# Patient Record
Sex: FEMALE | Race: WHITE | NOT HISPANIC OR LATINO | Employment: OTHER | URBAN - METROPOLITAN AREA
[De-identification: names, ages, dates, MRNs, and addresses within clinical notes are randomized per-mention and may not be internally consistent; named-entity substitution may affect disease eponyms.]

---

## 2017-01-03 ENCOUNTER — TELEPHONE (OUTPATIENT)
Dept: INTERNAL MEDICINE | Facility: CLINIC | Age: 67
End: 2017-01-03

## 2017-01-03 NOTE — TELEPHONE ENCOUNTER
----- Message from Lea Rivas sent at 1/3/2017  8:07 AM CST -----  Contact: ELIEZER RODRIGUEZ [[id number]]  Patient cancelled appt today

## 2017-01-04 ENCOUNTER — DOCUMENTATION ONLY (OUTPATIENT)
Dept: PSYCHIATRY | Facility: CLINIC | Age: 67
End: 2017-01-04

## 2017-01-04 NOTE — PROGRESS NOTES
Pt called today to reschedule her follow up appointment with me today.      KIT CLARK MD   [facility]   1/4/2017 9:51 AM

## 2017-01-09 NOTE — TELEPHONE ENCOUNTER
----- Message from Catrina Romo sent at 1/9/2017 11:53 AM CST -----  Contact: pt  _  1st Request  _  2nd Request  _  3rd Request    Please refill the medication(s) listed below. Please call the patient when the prescription(s) is ready for  at the phone number (___)(___-_____) .[phone]    Medication #1hydrocodone-acetaminophen 10-325mg (NORCO)  mg Tab    Medication #2      [facility] [address]

## 2017-01-09 NOTE — TELEPHONE ENCOUNTER
Pt's requesting RF for norco sent to preferred pharmacy    [facility] 12/28/16    Please auth request

## 2017-01-09 NOTE — TELEPHONE ENCOUNTER
Pt called again requesting RF for norco sent to preferred pharmacy    [facility] 12/28/16    Please auth request

## 2017-01-09 NOTE — TELEPHONE ENCOUNTER
----- Message from Yessy Fernandez sent at 1/6/2017  4:30 PM CST -----  Contact: Self  X   1st Request  _  2nd Request  _  3rd Request    Please refill the medication(s) listed below. Please call the patient when the prescription(s) is ready for  at the phone number [phone] .    Medication #1 hydrocodone-acetaminophen 10-325mg (NORCO)  mg Tab      Preferred Pharmacy: [facility] at [phone]

## 2017-01-10 RX ORDER — HYDROCODONE BITARTRATE AND ACETAMINOPHEN 10; 325 MG/1; MG/1
1 TABLET ORAL EVERY 12 HOURS PRN
Qty: 60 TABLET | Refills: 0 | Status: SHIPPED | OUTPATIENT
Start: 2017-01-10 | End: 2017-02-09 | Stop reason: SDUPTHER

## 2017-01-10 NOTE — TELEPHONE ENCOUNTER
Pt advised that rx auth and sent to preferred pharmacy    No further questions/concerns at this time

## 2017-01-10 NOTE — TELEPHONE ENCOUNTER
----- Message from Lilia Walls sent at 1/10/2017 10:18 AM CST -----  Contact: Self  X  1st Request  _  2nd Request  _  3rd Request        Who: ELIEZER RODRIGUEZ [[id number]]    Why: Pt is calling to find out the status of her refill request for hydrocodone-acetaminophen 10-325mg (NORCO)  mg Tab.  Pt is wanting to know if it will be sent to the pharmacy list below or will she need to come in an pick it up.  Please contact pt to further discuss and advise.    What Number to Call Back: [phone]    When to Expect a call back: (Before the end of the day)   -- if call after 3:00 call back will be tomorrow.    [id number] - [place], [place] - [address]

## 2017-01-18 ENCOUNTER — OFFICE VISIT (OUTPATIENT)
Dept: INTERNAL MEDICINE | Facility: CLINIC | Age: 67
End: 2017-01-18
Attending: INTERNAL MEDICINE
Payer: MEDICARE

## 2017-01-18 ENCOUNTER — LAB VISIT (OUTPATIENT)
Dept: LAB | Facility: OTHER | Age: 67
End: 2017-01-18
Attending: INTERNAL MEDICINE
Payer: MEDICARE

## 2017-01-18 ENCOUNTER — TELEPHONE (OUTPATIENT)
Dept: INTERNAL MEDICINE | Facility: CLINIC | Age: 67
End: 2017-01-18

## 2017-01-18 VITALS
HEIGHT: 66 IN | BODY MASS INDEX: 33.24 KG/M2 | WEIGHT: 206.81 LBS | DIASTOLIC BLOOD PRESSURE: 74 MMHG | HEART RATE: 93 BPM | SYSTOLIC BLOOD PRESSURE: 130 MMHG

## 2017-01-18 DIAGNOSIS — Z01.818 PRE-OPERATIVE CLEARANCE: ICD-10-CM

## 2017-01-18 DIAGNOSIS — Z23 NEED FOR INFLUENZA VACCINATION: ICD-10-CM

## 2017-01-18 DIAGNOSIS — E61.2 MAGNESIUM DEFICIENCY: Primary | ICD-10-CM

## 2017-01-18 DIAGNOSIS — E87.1 HYPONATREMIA: Primary | ICD-10-CM

## 2017-01-18 DIAGNOSIS — Z12.39 SCREENING FOR BREAST CANCER: ICD-10-CM

## 2017-01-18 DIAGNOSIS — E83.42 HYPOMAGNESEMIA: ICD-10-CM

## 2017-01-18 DIAGNOSIS — E87.1 HYPONATREMIA: ICD-10-CM

## 2017-01-18 LAB
ANION GAP SERPL CALC-SCNC: 7 MMOL/L
BUN SERPL-MCNC: 18 MG/DL
CALCIUM SERPL-MCNC: 8.6 MG/DL
CHLORIDE SERPL-SCNC: 97 MMOL/L
CO2 SERPL-SCNC: 24 MMOL/L
CREAT SERPL-MCNC: 0.8 MG/DL
EST. GFR  (AFRICAN AMERICAN): >60 ML/MIN/1.73 M^2
EST. GFR  (NON AFRICAN AMERICAN): >60 ML/MIN/1.73 M^2
GLUCOSE SERPL-MCNC: 87 MG/DL
MAGNESIUM SERPL-MCNC: 0.9 MG/DL
POTASSIUM SERPL-SCNC: 4.9 MMOL/L
SODIUM SERPL-SCNC: 128 MMOL/L

## 2017-01-18 PROCEDURE — 1160F RVW MEDS BY RX/DR IN RCRD: CPT | Mod: S$GLB,,, | Performed by: INTERNAL MEDICINE

## 2017-01-18 PROCEDURE — 99999 PR PBB SHADOW E&M-EST. PATIENT-LVL III: CPT | Mod: PBBFAC,,, | Performed by: INTERNAL MEDICINE

## 2017-01-18 PROCEDURE — 36415 COLL VENOUS BLD VENIPUNCTURE: CPT

## 2017-01-18 PROCEDURE — 1157F ADVNC CARE PLAN IN RCRD: CPT | Mod: S$GLB,,, | Performed by: INTERNAL MEDICINE

## 2017-01-18 PROCEDURE — 83735 ASSAY OF MAGNESIUM: CPT

## 2017-01-18 PROCEDURE — 3075F SYST BP GE 130 - 139MM HG: CPT | Mod: S$GLB,,, | Performed by: INTERNAL MEDICINE

## 2017-01-18 PROCEDURE — G0008 ADMIN INFLUENZA VIRUS VAC: HCPCS | Mod: S$GLB,,, | Performed by: INTERNAL MEDICINE

## 2017-01-18 PROCEDURE — 1125F AMNT PAIN NOTED PAIN PRSNT: CPT | Mod: S$GLB,,, | Performed by: INTERNAL MEDICINE

## 2017-01-18 PROCEDURE — 99397 PER PM REEVAL EST PAT 65+ YR: CPT | Mod: S$GLB,,, | Performed by: INTERNAL MEDICINE

## 2017-01-18 PROCEDURE — 80048 BASIC METABOLIC PNL TOTAL CA: CPT

## 2017-01-18 PROCEDURE — 1159F MED LIST DOCD IN RCRD: CPT | Mod: S$GLB,,, | Performed by: INTERNAL MEDICINE

## 2017-01-18 PROCEDURE — 3078F DIAST BP <80 MM HG: CPT | Mod: S$GLB,,, | Performed by: INTERNAL MEDICINE

## 2017-01-18 PROCEDURE — 99499 UNLISTED E&M SERVICE: CPT | Mod: S$PBB,,, | Performed by: INTERNAL MEDICINE

## 2017-01-18 PROCEDURE — 90662 IIV NO PRSV INCREASED AG IM: CPT | Mod: S$GLB,,, | Performed by: INTERNAL MEDICINE

## 2017-01-18 NOTE — TELEPHONE ENCOUNTER
Detailed msg left ONVM to inform her of BMP lab appt scheduled Monday    If pt RTC please notify with this msg

## 2017-01-18 NOTE — TELEPHONE ENCOUNTER
Spoke with pt. No weakness or palpitations or nausea. Pt reports has not taken oral magnesium in 4-5 days - no SE from this just forgot to take. Does agree to resume this today. Will give mag 400 twice daily and repeat labs on Monday. Counseled pt that this can cause diarrhea - if this develops then pt will decrease to 400mg daily. ER and RTC prompts given. Office to schedule labs on Monday.

## 2017-01-18 NOTE — PROGRESS NOTES
Patient given HD Influenza Im in the LD. Patient tolerated well and Band-Aid was applied. Lot#[id number] Exp:04/25/2017. Patient advised to wait in the lobby for 15 min to make sure no adverse reactions occur.Patient given VIS information sheet. Patient states verbal understanding and has no further questions.

## 2017-01-18 NOTE — MR AVS SNAPSHOT
[facility]  [address]  [place] [address]  Phone: [phone]  Fax: [phone]                  Nalini Varma   2017 8:20 AM   Office Visit    Description:  Female : 1950   Provider:  Yane Sahni MD   Department:  [facility]           Reason for Visit     Annual Exam           Diagnoses this Visit        Comments    Hyponatremia    -  Primary     Hypomagnesemia         Need for influenza vaccination         Screening for breast cancer                To Do List           Future Appointments        Provider Department Dept Phone    2017 9:30 AM LAB, [facility] [phone]    3/21/2017 9:00 AM [facility] MAMMO1 [facility] [phone]    3/28/2017 8:40 AM Yane Sahni MD [facility] [phone]    2017 8:20 AM Yane Sahni MD [facility] [phone]      Goals (5 Years of Data)     None      Follow-Up and Disposition     Return in about 3 months (around 2017), or if symptoms worsen or fail to improve.      [facility]     [facility] -  Assistance  Registered nurses in the [facility] provide clinical advisement, health education, appointment booking, and other advisory services.  Call for this free service at [phone].             Medications           STOP taking these medications     sertraline (ZOLOFT) 25 MG tablet Start by taking 25mg daily for 7 days then increase to 50mg daily. Please take with food           Verify that the below list of medications is an accurate representation of the medications you are currently taking.  If none reported, the list may be blank. If incorrect, please contact your healthcare provider. Carry this list with you in case of emergency.           Current Medications     albuterol 90 mcg/actuation inhaler Inhale 2 puffs into the lungs every 6 (six) hours as  "needed for Wheezing.    alendronate (FOSAMAX) 70 MG tablet Take 1 tablet (70 mg total) by mouth every 7 days.    atorvastatin (LIPITOR) 40 MG tablet Take 1 tablet (40 mg total) by mouth once daily.    calcium carbonate (CALTRATE 600) 600 mg (1,500 mg) Tab Take 1 tablet (600 mg total) by mouth 2 (two) times daily with meals.    cyanocobalamin 1,000 mcg TbSR Take 1,000 mcg by mouth once daily.    fluticasone-salmeterol (ADVAIR HFA) 115-21 mcg/actuation HFAA Inhale 2 puffs into the lungs every 12 (twelve) hours.    hydrocodone-acetaminophen 10-325mg (NORCO)  mg Tab Take 1 tablet by mouth every 12 (twelve) hours as needed for Pain.    lisinopril (PRINIVIL,ZESTRIL) 30 MG tablet Take 1 tablet (30 mg total) by mouth once daily.    magnesium oxide 400 mg Cap Take 1 capsule by mouth once daily.    pantoprazole (PROTONIX) 20 MG tablet Take 1 tablet (20 mg total) by mouth once daily.    tiotropium (SPIRIVA) 18 mcg inhalation capsule Inhale 1 capsule (18 mcg total) into the lungs once daily.    walker (ULTRA-LIGHT ROLLATOR) Misc 1 each by Misc.(Non-Drug; Combo Route) route once daily.           Clinical Reference Information           Vital Signs - Last Recorded  Most recent update: 1/18/2017  8:36 AM by Compa Junior MA    BP Pulse Ht Wt BMI    130/74 (BP Location: Left arm, Patient Position: Sitting, BP Method: Manual) 93 5' 6" (1.676 m) 93.8 kg (206 lb 12.7 oz) 33.38 kg/m2      Blood Pressure          Most Recent Value    BP  130/74      Allergies as of 1/18/2017     No Known Allergies      Immunizations Administered on Date of Encounter - 1/18/2017     Name Date Dose VIS Date Route    Influenza - High Dose 1/18/2017 0.5 mL 8/7/2015 Intramuscular      Orders Placed During Today's Visit      Normal Orders This Visit    Influenza - High Dose (65+) (PF) (IM)     Future Labs/Procedures Expected by Expires    Basic metabolic panel  1/18/2017 1/18/2018    Magnesium  1/18/2017 3/19/2018    Mammo Digital Screening " Bilateral With CAD  3/18/2017 3/18/2018      Smoking Cessation     If you would like to quit smoking:   You may be eligible for free services if you are a [place] resident and started smoking cigarettes before September 1, 1988.  Call the [employer]oking [employer]essation [employer] ([employer]) toll free at (968) 142-4820 or [phone] [phone].   Call 4-420-QUIT-NOW if you do not meet the above criteria.

## 2017-01-18 NOTE — PROGRESS NOTES
Subjective:       Patient ID: Nalini Varma is a 66 y.o. female.    Chief Complaint: Annual Exam    HPI   Pt here for hospital f/u. Was admitted with low mag and sodium. Started on zoloft in mid Nov. Sodium low since then. No confusion or MS changes. She has been taking mag 400mg otc daily. Reports weakness has not returned.   hctz stopped at discharge. bp well controlled today.     Is planning on cataract surgery 1/25.   Was given zoloft for anxiety by psych. Pt does not report any significant improvement or any SE from this     No cp. Has hx of copd and tobacco dependence. nelson inhalers. No change in MEDELLIN. Seen by cards 10/06 and stress test neg for myocaridal ischemia on stress echo. No cp. Decreased exertion due to chronic knee arthritis. Is able to walk a block with walker - will stop 1-2 times due to knee pain.     Review of Systems    Objective:      Physical Exam   Constitutional: She is oriented to person, place, and time. She appears well-developed and well-nourished.   HENT:   Head: Normocephalic and atraumatic.   Eyes: Conjunctivae and EOM are normal.   Neck: Neck supple.   Cardiovascular: Normal rate, regular rhythm, normal heart sounds and intact distal pulses.    Pulmonary/Chest: Effort normal and breath sounds normal. No respiratory distress. She has no wheezes. She has no rales.   Abdominal: Soft. Normal appearance and bowel sounds are normal.   Lymphadenopathy:     She has no cervical adenopathy.   Neurological: She is alert and oriented to person, place, and time. She has normal strength. Gait normal.   Skin: Skin is warm, dry and intact. No cyanosis. Nails show no clubbing.   Psychiatric: She has a normal mood and affect. Her speech is normal and behavior is normal. Judgment and thought content normal. Cognition and memory are normal.       Assessment:       Nalini was seen today for annual exam.    Diagnoses and all orders for this visit:    Hyponatremia: euvolemic, suspect 2/2 to zoloft given  for anxiety. Will stop this. If any issues with stopping med, pt understands to let me know. Check bmp today. No MS changes or weakness. ER and RTC prompts given.   -     Basic metabolic panel; Future    Hypomagnesemia: cont supplement, check labs.   -     Magnesium; Future  -     Basic metabolic panel; Future    Need for influenza vaccination  -     Influenza - High Dose (65+) (PF) (IM)    Screening for breast cancer  -     Mammo Digital Screening Bilateral With CAD; Future    pre op clearance: seen by cards for abnormal ekg in October - reviewed notes and recent stress test - no cp. pending review of labs - counseled pt that if develops copd exacerbation or weakness then rec postpone elective surgery in that setting.     Planning on cataract surgery on 1/25  Stop zoloft  Give flu vacc today  Schedule mmg   Check labs

## 2017-01-18 NOTE — TELEPHONE ENCOUNTER
Maribel from  lab called with critical lab low mag 0.9 on pt    Pt seen today in clinic    Please auth/ advised

## 2017-01-23 ENCOUNTER — LAB VISIT (OUTPATIENT)
Dept: LAB | Facility: OTHER | Age: 67
End: 2017-01-23
Attending: INTERNAL MEDICINE
Payer: MEDICARE

## 2017-01-23 ENCOUNTER — TELEPHONE (OUTPATIENT)
Dept: INTERNAL MEDICINE | Facility: CLINIC | Age: 67
End: 2017-01-23

## 2017-01-23 DIAGNOSIS — E87.1 HYPONATREMIA: Primary | ICD-10-CM

## 2017-01-23 DIAGNOSIS — E61.2 MAGNESIUM DEFICIENCY: ICD-10-CM

## 2017-01-23 DIAGNOSIS — E83.42 HYPOMAGNESEMIA: ICD-10-CM

## 2017-01-23 DIAGNOSIS — E87.1 HYPONATREMIA: ICD-10-CM

## 2017-01-23 LAB
ANION GAP SERPL CALC-SCNC: 5 MMOL/L
BUN SERPL-MCNC: 13 MG/DL
CALCIUM SERPL-MCNC: 9.4 MG/DL
CHLORIDE SERPL-SCNC: 98 MMOL/L
CO2 SERPL-SCNC: 27 MMOL/L
CREAT SERPL-MCNC: 0.8 MG/DL
EST. GFR  (AFRICAN AMERICAN): >60 ML/MIN/1.73 M^2
EST. GFR  (NON AFRICAN AMERICAN): >60 ML/MIN/1.73 M^2
GLUCOSE SERPL-MCNC: 113 MG/DL
MAGNESIUM SERPL-MCNC: 1.2 MG/DL
POTASSIUM SERPL-SCNC: 4.6 MMOL/L
SODIUM SERPL-SCNC: 130 MMOL/L

## 2017-01-23 PROCEDURE — 80048 BASIC METABOLIC PNL TOTAL CA: CPT

## 2017-01-23 PROCEDURE — 83735 ASSAY OF MAGNESIUM: CPT

## 2017-01-23 PROCEDURE — 36415 COLL VENOUS BLD VENIPUNCTURE: CPT

## 2017-01-24 NOTE — TELEPHONE ENCOUNTER
Please notify pt that her sodium is still low but continuing to slowly improve - think cause was due to hctz and zoloft. Do NOT recommend resuming these.    Magnesium has mildly improved but is still low. rec cont magnesium supplement daily.   Please schedule repeat labs in 2 weeks.

## 2017-01-24 NOTE — TELEPHONE ENCOUNTER
----- Message from Catrina Romo sent at 1/23/2017  4:10 PM CST -----  Contact: pt  _  1st Request  _  2nd Request  _  3rd Request        Who: pt    Why: pt needs her blood test results. She also needs to know if she should go back on magnesium. She has cancelled her cataract appt this wednesday    What Number to Call Back:[phone]    When to Expect a call back: (Before the end of the day)   -- if call after 3:00 call back will be tomorrow.

## 2017-01-24 NOTE — TELEPHONE ENCOUNTER
Pt advised of Dr. Sahni advice, verbalized understanding, and has no further questions/concerns at this time      appt reminder letter mailed to pt as requested

## 2017-02-03 NOTE — TELEPHONE ENCOUNTER
----- Message from Yessy Fernandez sent at 2/3/2017  1:16 PM CST -----  Contact: Self  X   1st Request  _  2nd Request  _  3rd Request        Who: ELIEZER RODRIGUEZ [[id number]]    Why: Pt states her cataract surgery is on 02/08 and her medical clearance form is needed prior to her surgery. Pt states the form along with fax number was left with the clinical team. Please call pt, thanks!    What Number to Call Back: [phone]    When to Expect a call back: (Before the end of the day)   -- if the call is after 12:00, the call back will be tomorrow.

## 2017-02-06 ENCOUNTER — LAB VISIT (OUTPATIENT)
Dept: LAB | Facility: OTHER | Age: 67
End: 2017-02-06
Attending: INTERNAL MEDICINE
Payer: MEDICARE

## 2017-02-06 DIAGNOSIS — E83.42 HYPOMAGNESEMIA: ICD-10-CM

## 2017-02-06 DIAGNOSIS — E87.1 HYPONATREMIA: ICD-10-CM

## 2017-02-06 LAB
ANION GAP SERPL CALC-SCNC: 12 MMOL/L
BUN SERPL-MCNC: 21 MG/DL
CALCIUM SERPL-MCNC: 9.2 MG/DL
CHLORIDE SERPL-SCNC: 99 MMOL/L
CO2 SERPL-SCNC: 19 MMOL/L
CREAT SERPL-MCNC: 0.9 MG/DL
EST. GFR  (AFRICAN AMERICAN): >60 ML/MIN/1.73 M^2
EST. GFR  (NON AFRICAN AMERICAN): >60 ML/MIN/1.73 M^2
GLUCOSE SERPL-MCNC: 69 MG/DL
MAGNESIUM SERPL-MCNC: 1.4 MG/DL
POTASSIUM SERPL-SCNC: 4.9 MMOL/L
SODIUM SERPL-SCNC: 130 MMOL/L

## 2017-02-06 PROCEDURE — 83735 ASSAY OF MAGNESIUM: CPT

## 2017-02-06 PROCEDURE — 80048 BASIC METABOLIC PNL TOTAL CA: CPT

## 2017-02-06 PROCEDURE — 36415 COLL VENOUS BLD VENIPUNCTURE: CPT

## 2017-02-07 ENCOUNTER — TELEPHONE (OUTPATIENT)
Dept: INTERNAL MEDICINE | Facility: CLINIC | Age: 67
End: 2017-02-07

## 2017-02-07 NOTE — TELEPHONE ENCOUNTER
----- Message from Farzaneh Hernández sent at 2/7/2017 12:20 PM CST -----  _  1st Request  _  2nd Request  x_  3rd Request        Who: Patient    Why: Pt called she states that she need medical clearence for her surgery on tomorrow. Please call her as soon as possible.       What Number to Call Back:[phone]    When to Expect a call back: (Before the end of the day)   -- if the call is after 12:00, the call back will be tomorrow.

## 2017-02-07 NOTE — TELEPHONE ENCOUNTER
Please notify pt that sodium on labs is stable, magnesium is low but slowly improving. rec cont to take magnesium supplement.     Form for cataract clearance filled out. Ok to proceed as scheduled. Please fax to eye clinic asap and notify pt of this. Thanks!

## 2017-02-08 ENCOUNTER — TELEPHONE (OUTPATIENT)
Dept: INTERNAL MEDICINE | Facility: CLINIC | Age: 67
End: 2017-02-08

## 2017-02-08 DIAGNOSIS — E83.42 HYPOMAGNESEMIA: Primary | ICD-10-CM

## 2017-02-08 DIAGNOSIS — E87.1 HYPONATREMIA: ICD-10-CM

## 2017-02-08 NOTE — TELEPHONE ENCOUNTER
Pt states that her medical clearance has already been completed and faxed. Pt has no further questions/concerns at this time.

## 2017-02-08 NOTE — TELEPHONE ENCOUNTER
----- Message from Lea Rivas sent at 2/7/2017  8:25 AM CST -----  Contact: ELIEZER RODRIGUEZ [[id number]]  x_  1st Request  _  2nd Request  _  3rd Request        Who ELIEZER RODRIGUEZ [[id number]]:     Why: Patient would like medical clearance note faxed to eye doctor. Please give patient a call back at your earliest convenience. Thanks!    What Number to Call Back: [phone]    When to Expect a call back: (Before the end of the day)   -- if the call is after 12:00, the call back will be tomorrow.

## 2017-02-08 NOTE — TELEPHONE ENCOUNTER
----- Message from Farzaneh Hernández sent at 2/8/2017 10:44 AM CST -----  Contact: [facility]   Outpatient surgery center called stating that the patient's BP was elevated when she arrived it was 192/110 and when she left it was 198/97 she said she took her medication and that it not normally high the nurse stated if any questions please call her at [phone] or [phone]

## 2017-02-09 ENCOUNTER — PATIENT MESSAGE (OUTPATIENT)
Dept: INTERNAL MEDICINE | Facility: CLINIC | Age: 67
End: 2017-02-09

## 2017-02-09 RX ORDER — HYDROCODONE BITARTRATE AND ACETAMINOPHEN 10; 325 MG/1; MG/1
1 TABLET ORAL EVERY 12 HOURS PRN
Qty: 60 TABLET | Refills: 0 | Status: SHIPPED | OUTPATIENT
Start: 2017-02-09 | End: 2017-03-07 | Stop reason: SDUPTHER

## 2017-02-09 NOTE — TELEPHONE ENCOUNTER
Please schedule repeat mag and bmp in 2-3 weeks to monitor for improvement. rec cont daily mag supplement as this has improved but is still low

## 2017-03-03 ENCOUNTER — LAB VISIT (OUTPATIENT)
Dept: LAB | Facility: OTHER | Age: 67
End: 2017-03-03
Attending: INTERNAL MEDICINE
Payer: MEDICARE

## 2017-03-03 DIAGNOSIS — E83.42 HYPOMAGNESEMIA: ICD-10-CM

## 2017-03-03 DIAGNOSIS — E87.1 HYPONATREMIA: ICD-10-CM

## 2017-03-03 LAB
ANION GAP SERPL CALC-SCNC: 8 MMOL/L
BUN SERPL-MCNC: 20 MG/DL
CALCIUM SERPL-MCNC: 9.3 MG/DL
CHLORIDE SERPL-SCNC: 98 MMOL/L
CO2 SERPL-SCNC: 24 MMOL/L
CREAT SERPL-MCNC: 1 MG/DL
EST. GFR  (AFRICAN AMERICAN): >60 ML/MIN/1.73 M^2
EST. GFR  (NON AFRICAN AMERICAN): 58.4 ML/MIN/1.73 M^2
GLUCOSE SERPL-MCNC: 104 MG/DL
MAGNESIUM SERPL-MCNC: 1.5 MG/DL
POTASSIUM SERPL-SCNC: 4.7 MMOL/L
SODIUM SERPL-SCNC: 130 MMOL/L

## 2017-03-03 PROCEDURE — 83735 ASSAY OF MAGNESIUM: CPT

## 2017-03-03 PROCEDURE — 36415 COLL VENOUS BLD VENIPUNCTURE: CPT

## 2017-03-03 PROCEDURE — 80048 BASIC METABOLIC PNL TOTAL CA: CPT

## 2017-03-07 ENCOUNTER — PATIENT MESSAGE (OUTPATIENT)
Dept: INTERNAL MEDICINE | Facility: CLINIC | Age: 67
End: 2017-03-07

## 2017-03-07 RX ORDER — HYDROCODONE BITARTRATE AND ACETAMINOPHEN 10; 325 MG/1; MG/1
1 TABLET ORAL EVERY 12 HOURS PRN
Qty: 60 TABLET | Refills: 0 | Status: SHIPPED | OUTPATIENT
Start: 2017-03-07 | End: 2017-04-10 | Stop reason: SDUPTHER

## 2017-03-21 ENCOUNTER — HOSPITAL ENCOUNTER (OUTPATIENT)
Dept: RADIOLOGY | Facility: OTHER | Age: 67
Discharge: HOME | End: 2017-03-21
Attending: INTERNAL MEDICINE
Payer: MEDICARE

## 2017-03-21 DIAGNOSIS — Z12.39 SCREENING FOR BREAST CANCER: ICD-10-CM

## 2017-03-21 PROCEDURE — 77067 SCR MAMMO BI INCL CAD: CPT | Mod: 26,,, | Performed by: RADIOLOGY

## 2017-03-21 PROCEDURE — 77067 SCR MAMMO BI INCL CAD: CPT | Mod: TC

## 2017-03-21 PROCEDURE — 77063 BREAST TOMOSYNTHESIS BI: CPT | Mod: 26,,, | Performed by: RADIOLOGY

## 2017-03-28 ENCOUNTER — OFFICE VISIT (OUTPATIENT)
Dept: INTERNAL MEDICINE | Facility: CLINIC | Age: 67
End: 2017-03-28
Attending: INTERNAL MEDICINE
Payer: MEDICARE

## 2017-03-28 VITALS
HEART RATE: 95 BPM | BODY MASS INDEX: 34.75 KG/M2 | OXYGEN SATURATION: 97 % | SYSTOLIC BLOOD PRESSURE: 138 MMHG | WEIGHT: 216.25 LBS | DIASTOLIC BLOOD PRESSURE: 90 MMHG | HEIGHT: 66 IN

## 2017-03-28 DIAGNOSIS — I10 HYPERTENSION, BENIGN: Primary | ICD-10-CM

## 2017-03-28 DIAGNOSIS — E83.42 HYPOMAGNESEMIA: ICD-10-CM

## 2017-03-28 DIAGNOSIS — D50.0 IRON DEFICIENCY ANEMIA DUE TO CHRONIC BLOOD LOSS: ICD-10-CM

## 2017-03-28 DIAGNOSIS — G89.28 CHRONIC PAIN FOLLOWING SURGERY OR PROCEDURE: ICD-10-CM

## 2017-03-28 DIAGNOSIS — Z01.419 VISIT FOR PELVIC EXAM: ICD-10-CM

## 2017-03-28 DIAGNOSIS — E78.5 HYPERLIPIDEMIA, UNSPECIFIED HYPERLIPIDEMIA TYPE: ICD-10-CM

## 2017-03-28 DIAGNOSIS — J44.9 CHRONIC OBSTRUCTIVE PULMONARY DISEASE, UNSPECIFIED COPD TYPE: ICD-10-CM

## 2017-03-28 DIAGNOSIS — M81.0 OSTEOPOROSIS: ICD-10-CM

## 2017-03-28 DIAGNOSIS — F17.200 TOBACCO DEPENDENCE: ICD-10-CM

## 2017-03-28 DIAGNOSIS — Z12.31 ENCOUNTER FOR SCREENING MAMMOGRAM FOR BREAST CANCER: ICD-10-CM

## 2017-03-28 DIAGNOSIS — E87.1 HYPONATREMIA: ICD-10-CM

## 2017-03-28 LAB
BACTERIA #/AREA URNS HPF: ABNORMAL /HPF
BILIRUB UR QL STRIP: NEGATIVE
CLARITY UR: ABNORMAL
COLOR UR: YELLOW
GLUCOSE UR QL STRIP: NEGATIVE
HGB UR QL STRIP: NEGATIVE
HYALINE CASTS #/AREA URNS LPF: 0 /LPF
KETONES UR QL STRIP: NEGATIVE
LEUKOCYTE ESTERASE UR QL STRIP: ABNORMAL
MICROSCOPIC COMMENT: ABNORMAL
NITRITE UR QL STRIP: NEGATIVE
OSMOLALITY UR: 481 MOSM/KG
PH UR STRIP: 7 [PH] (ref 5–8)
PROT UR QL STRIP: ABNORMAL
RBC #/AREA URNS HPF: 2 /HPF (ref 0–4)
SODIUM UR-SCNC: 46 MMOL/L
SP GR UR STRIP: 1.02 (ref 1–1.03)
SQUAMOUS #/AREA URNS HPF: 3 /HPF
URN SPEC COLLECT METH UR: ABNORMAL
UROBILINOGEN UR STRIP-ACNC: NEGATIVE EU/DL
WBC #/AREA URNS HPF: 55 /HPF (ref 0–5)
WBC CLUMPS URNS QL MICRO: ABNORMAL

## 2017-03-28 PROCEDURE — 1157F ADVNC CARE PLAN IN RCRD: CPT | Mod: S$GLB,,, | Performed by: INTERNAL MEDICINE

## 2017-03-28 PROCEDURE — 1159F MED LIST DOCD IN RCRD: CPT | Mod: S$GLB,,, | Performed by: INTERNAL MEDICINE

## 2017-03-28 PROCEDURE — 99214 OFFICE O/P EST MOD 30 MIN: CPT | Mod: S$GLB,,, | Performed by: INTERNAL MEDICINE

## 2017-03-28 PROCEDURE — 83935 ASSAY OF URINE OSMOLALITY: CPT

## 2017-03-28 PROCEDURE — 99999 PR PBB SHADOW E&M-EST. PATIENT-LVL IV: CPT | Mod: PBBFAC,,, | Performed by: INTERNAL MEDICINE

## 2017-03-28 PROCEDURE — 1160F RVW MEDS BY RX/DR IN RCRD: CPT | Mod: S$GLB,,, | Performed by: INTERNAL MEDICINE

## 2017-03-28 PROCEDURE — 99499 UNLISTED E&M SERVICE: CPT | Mod: S$PBB,,, | Performed by: INTERNAL MEDICINE

## 2017-03-28 PROCEDURE — 3075F SYST BP GE 130 - 139MM HG: CPT | Mod: S$GLB,,, | Performed by: INTERNAL MEDICINE

## 2017-03-28 PROCEDURE — 84300 ASSAY OF URINE SODIUM: CPT

## 2017-03-28 PROCEDURE — 3080F DIAST BP >= 90 MM HG: CPT | Mod: S$GLB,,, | Performed by: INTERNAL MEDICINE

## 2017-03-28 PROCEDURE — 1125F AMNT PAIN NOTED PAIN PRSNT: CPT | Mod: S$GLB,,, | Performed by: INTERNAL MEDICINE

## 2017-03-28 PROCEDURE — 81000 URINALYSIS NONAUTO W/SCOPE: CPT

## 2017-03-28 RX ORDER — LISINOPRIL 40 MG/1
40 TABLET ORAL DAILY
Qty: 90 TABLET | Refills: 1 | Status: SHIPPED | OUTPATIENT
Start: 2017-03-28 | End: 2017-10-02 | Stop reason: SDUPTHER

## 2017-03-28 NOTE — PATIENT INSTRUCTIONS
Your test results will be communicated to you via: [facility], Telephone or Letter.  If you have not received your test results within one week. Please contact the clinic at [phone].

## 2017-03-28 NOTE — PROGRESS NOTES
Subjective:       Patient ID: Nalini Varma is a 66 y.o. female.    Chief Complaint: COPD    Hypertension   This is a chronic problem. The current episode started more than 1 year ago. The problem has been waxing and waning since onset. The problem is uncontrolled. Pertinent negatives include no chest pain or headaches. There are no associated agents to hypertension. Risk factors for coronary artery disease include obesity, post-menopausal state, stress, sedentary lifestyle and smoking/tobacco exposure. Past treatments include ACE inhibitors. The current treatment provides mild improvement. Compliance problems include psychosocial issues and exercise.  There is no history of CAD/MI, CVA or a thyroid problem. There is no history of a hypertension causing med.      Here for htn f/u. Taking lisinopril. bp elevated  - took med today.     Has successful cataract srugery bilaterally  Taking otc mag 2 otc supplements per day - unsure of dose at this time - maybe 400mg x 2 daily. Reviewed recent labs. Hyponatremia unchanged after stopping zoloft and hctz. No etoh or beer drinking per pt. No confusion or mental status changes. Reports staying hydrated. Still smoking.     Hx of copd - stable on inhalers - symptoms stable.   Has chronic left knee pain after procedure. Followed by orhto at  - surgery pending permanent housing placement due to hx of post op infection and plan to reduce risk of post op infection with new surgery.- taking norco which helps Manage pain.     Review of Systems   Cardiovascular: Negative for chest pain.   Neurological: Negative for headaches.       Objective:      Physical Exam   Constitutional: She is oriented to person, place, and time. She appears well-developed and well-nourished.   HENT:   Head: Normocephalic and atraumatic.   Right Ear: External ear normal.   Left Ear: External ear normal.   Nose: Nose normal.   Mouth/Throat: Oropharynx is clear and moist. No oropharyngeal exudate.   Eyes:  Conjunctivae and EOM are normal.   Neck: Neck supple.   Cardiovascular: Normal rate and regular rhythm.    Pulmonary/Chest: Effort normal and breath sounds normal. She has no wheezes. She has no rales.   Abdominal: Soft. Normal appearance and bowel sounds are normal.   Lymphadenopathy:     She has no cervical adenopathy.   Neurological: She is alert and oriented to person, place, and time. She has normal strength. Gait normal.   Skin: Skin is warm, dry and intact. No cyanosis. Nails show no clubbing.   Psychiatric: She has a normal mood and affect. Her speech is normal and behavior is normal. Judgment and thought content normal. Cognition and memory are normal.       Assessment:       Nalini was seen today for copd.    Diagnoses and all orders for this visit:    Hypertension, benign: uncontrolled, inc lisinopril to 40mg, rtc in 1 weeks for nv for bp check, stop smoking!  -     COMPREHENSIVE METABOLIC PANEL; Future  -     TSH; Future  -     Lipid panel; Future    Hyponatremia  -     Urinalysis  -     Uric acid; Future  -     Osmolality, urine  -     Sodium, urine, random  -     COMPREHENSIVE METABOLIC PANEL; Future  -     CBC W/ AUTO DIFFERENTIAL; Future  -     TSH; Future  -     Lipid panel; Future  -     Hemoglobin A1c; Future  -     OSMOLALITY; Future    Chronic obstructive pulmonary disease, unspecified COPD type: stable, cont meds and stop smoking    Chronic pain following surgery or procedure: stable on norco, cont meds - f;/u with ortho once permanent housing acquired     Osteoporosis: cont alendronate, ca and vit d    Hypomagnesemia: cont oral mag    Hyperlipidemia, unspecified hyperlipidemia type: coint statin and low fat/chol diet  -     TSH; Future  -     Lipid panel; Future    Tobacco dependence: counseled to quit!  -     CT Chest Lung Screening Low Dose; Future    Iron deficiency anemia due to chronic blood loss: hx of pud and s/p egd and cscope at  - stable, avoid nsaids  -     CBC W/ AUTO DIFFERENTIAL;  Future    Visit for pelvic exam  -     Ambulatory Referral to Obstetrics / Gynecology    Other orders  -     lisinopril (PRINIVIL,ZESTRIL) 40 MG tablet; Take 1 tablet (40 mg total) by mouth once daily.  -     Urinalysis Microscopic

## 2017-03-28 NOTE — MR AVS SNAPSHOT
[facility]  [address]  [place] [address]  Phone: [phone]  Fax: [phone]                  Nalini Varma   3/28/2017 10:00 AM   Office Visit    Description:  Female : 1950   Provider:  Yane Sahni MD   Department:  [facility]           Reason for Visit     COPD           Diagnoses this Visit        Comments    Encounter for screening mammogram for breast cancer    -  Primary     Hyponatremia         Chronic obstructive pulmonary disease, unspecified COPD type         Chronic pain following surgery or procedure         Hypertension, benign         Osteoporosis         Hypomagnesemia         Hyperlipidemia, unspecified hyperlipidemia type         Tobacco dependence         Iron deficiency anemia due to chronic blood loss         Visit for pelvic exam                To Do List           Future Appointments        Provider Department Dept Phone    2017 8:00 AM NURSE, [facility] INTERNAL MEDICINE [facility] [phone]    2017 9:00 AM LAB, SAME DAY [facility] [phone]    2017 10:00 AM [facility] CT OP LIMIT [address] [facility] [phone]    5/3/2017 10:00 AM Elina Wong MD [facility] [address] [phone]      Goals (5 Years of Data)     None      Follow-Up and Disposition     Return in about 3 months (around 2017), or if symptoms worsen or fail to improve, for rtc in 2 weeks for bp check at [place].       These Medications        Disp Refills Start End    lisinopril (PRINIVIL,ZESTRIL) 40 MG tablet 90 tablet 1 3/28/2017     Take 1 tablet (40 mg total) by mouth once daily. - Oral    Pharmacy: [facility]/pharmacy #[id number] - [place], [place] - [address] Ph #: [phone]         [facility] On Call     [facility] On Call Nurse Care Line -  Assistance  Registered nurses in the [facility] provide clinical advisement, health education,  appointment booking, and other advisory services.  Call for this free service at [phone].             Medications           CHANGE how you are taking these medications     Start Taking Instead of    lisinopril (PRINIVIL,ZESTRIL) 40 MG tablet lisinopril (PRINIVIL,ZESTRIL) 30 MG tablet    Dosage:  Take 1 tablet (40 mg total) by mouth once daily. Dosage:  Take 1 tablet (30 mg total) by mouth once daily.    Reason for Change:  Reorder            Verify that the below list of medications is an accurate representation of the medications you are currently taking.  If none reported, the list may be blank. If incorrect, please contact your healthcare provider. Carry this list with you in case of emergency.           Current Medications     albuterol 90 mcg/actuation inhaler Inhale 2 puffs into the lungs every 6 (six) hours as needed for Wheezing.    alendronate (FOSAMAX) 70 MG tablet Take 1 tablet (70 mg total) by mouth every 7 days.    atorvastatin (LIPITOR) 40 MG tablet Take 1 tablet (40 mg total) by mouth once daily.    calcium carbonate (CALTRATE 600) 600 mg (1,500 mg) Tab Take 1 tablet (600 mg total) by mouth 2 (two) times daily with meals.    cyanocobalamin 1,000 mcg TbSR Take 1,000 mcg by mouth once daily.    fluticasone-salmeterol (ADVAIR HFA) 115-21 mcg/actuation HFAA Inhale 2 puffs into the lungs every 12 (twelve) hours.    hydrocodone-acetaminophen 10-325mg (NORCO)  mg Tab Take 1 tablet by mouth every 12 (twelve) hours as needed for Pain.    lisinopril (PRINIVIL,ZESTRIL) 40 MG tablet Take 1 tablet (40 mg total) by mouth once daily.    magnesium oxide 400 mg Cap Take 1 capsule by mouth once daily.    pantoprazole (PROTONIX) 20 MG tablet Take 1 tablet (20 mg total) by mouth once daily.    tiotropium (SPIRIVA) 18 mcg inhalation capsule Inhale 1 capsule (18 mcg total) into the lungs once daily.    walker (ULTRA-LIGHT ROLLATOR) Misc 1 each by Misc.(Non-Drug; Combo Route) route once daily.          "  Clinical Reference Information           Your Vitals Were     BP Pulse Height Weight SpO2 BMI    138/90 (BP Location: Left arm, Patient Position: Sitting, BP Method: Manual) 95 5' 6" (1.676 m) 98.1 kg (216 lb 4.3 oz) 97% 34.91 kg/m2      Blood Pressure          Most Recent Value    BP  (!)  138/90      Allergies as of 3/28/2017     No Known Allergies      Immunizations Administered on Date of Encounter - 3/28/2017     None      Orders Placed During Today's Visit      Normal Orders This Visit    Ambulatory Referral to Obstetrics / Gynecology     Osmolality, urine     Sodium, urine, random     Urinalysis     Future Labs/Procedures Expected by Expires    CBC W/ AUTO DIFFERENTIAL  3/28/2017 5/27/2018    COMPREHENSIVE METABOLIC PANEL  3/28/2017 5/27/2018    CT Chest Lung Screening Low Dose  3/28/2017 3/28/2018    Hemoglobin A1c  3/28/2017 4/11/2018    Lipid panel  3/28/2017 3/28/2018    OSMOLALITY  3/28/2017 5/27/2018    TSH  3/28/2017 5/27/2018    Uric acid  3/28/2017 6/26/2017      Instructions    Your test results will be communicated to you via: [facility], Telephone or Letter.  If you have not received your test results within one week. Please contact the clinic at [phone].           Smoking Cessation     If you would like to quit smoking:   You may be eligible for free services if you are a [place] resident and started smoking cigarettes before September 1, 1988.  Call the Smoking Cessation Trust ([facility]) toll free at (145) 198-3507 or (371) 406-0402.   Call 1-800-QUIT-NOW if you do not meet the above criteria.            Language Assistance Services     ATTENTION: Language assistance services are available, free of charge. Please call [phone].      ATENCIÓN: Si habla español, tiene a gao disposición servicios gratuitos de asistencia lingüística. Llame al [phone].     CHÚ Ý: N?u b?n nói Ti?ng Vi?t, có các d?ch v? h? tr? ngôn ng? mi?n phí dành cho b?n. G?i s? [phone].         [facility] complies with applicable Federal civil rights laws and does not discriminate on the basis of race, color, national origin, age, disability, or sex.

## 2017-03-29 ENCOUNTER — TELEPHONE (OUTPATIENT)
Dept: INTERNAL MEDICINE | Facility: CLINIC | Age: 67
End: 2017-03-29

## 2017-03-29 DIAGNOSIS — N30.00 ACUTE CYSTITIS WITHOUT HEMATURIA: Primary | ICD-10-CM

## 2017-03-29 RX ORDER — NITROFURANTOIN 25; 75 MG/1; MG/1
100 CAPSULE ORAL 2 TIMES DAILY
Qty: 10 CAPSULE | Refills: 0 | Status: SHIPPED | OUTPATIENT
Start: 2017-03-29 | End: 2017-04-12 | Stop reason: ALTCHOICE

## 2017-03-29 NOTE — TELEPHONE ENCOUNTER
----- Message from Lea Rivas sent at 3/29/2017 10:27 AM CDT -----  Contact: ELIEZER RODRIGUEZ [[id number]]  _x  1st Request  _  2nd Request  _  3rd Request        Who: ELIEZER RODRIGUEZ [[id number]]    Why: Patient is returning clinical staff phone call. Thanks!    What Number to Call Back: [phone]    When to Expect a call back: (Before the end of the day)   -- if the call is after 12:00, the call back will be tomorrow.

## 2017-03-29 NOTE — TELEPHONE ENCOUNTER
Please notify pt that urinalysis shows evidence of infection. I sent in Rx for antibiotic to pharmacy - rec she complete course - if possible, rec she rtc to submit urine for culture prior to starting abx today. Please help schedule

## 2017-03-30 ENCOUNTER — OFFICE VISIT (OUTPATIENT)
Dept: PSYCHIATRY | Facility: CLINIC | Age: 67
End: 2017-03-30
Payer: COMMERCIAL

## 2017-03-30 VITALS — SYSTOLIC BLOOD PRESSURE: 173 MMHG | HEART RATE: 91 BPM | HEIGHT: 66 IN | DIASTOLIC BLOOD PRESSURE: 77 MMHG

## 2017-03-30 DIAGNOSIS — F41.1 GAD (GENERALIZED ANXIETY DISORDER): ICD-10-CM

## 2017-03-30 DIAGNOSIS — F17.200 TOBACCO DEPENDENCE: Primary | ICD-10-CM

## 2017-03-30 PROCEDURE — 1160F RVW MEDS BY RX/DR IN RCRD: CPT | Mod: S$GLB,,, | Performed by: PSYCHIATRY & NEUROLOGY

## 2017-03-30 PROCEDURE — 1157F ADVNC CARE PLAN IN RCRD: CPT | Mod: S$GLB,,, | Performed by: PSYCHIATRY & NEUROLOGY

## 2017-03-30 PROCEDURE — 1159F MED LIST DOCD IN RCRD: CPT | Mod: S$GLB,,, | Performed by: PSYCHIATRY & NEUROLOGY

## 2017-03-30 PROCEDURE — 3077F SYST BP >= 140 MM HG: CPT | Mod: S$GLB,,, | Performed by: PSYCHIATRY & NEUROLOGY

## 2017-03-30 PROCEDURE — 99212 OFFICE O/P EST SF 10 MIN: CPT | Mod: S$GLB,,, | Performed by: PSYCHIATRY & NEUROLOGY

## 2017-03-30 PROCEDURE — 99999 PR PBB SHADOW E&M-EST. PATIENT-LVL III: CPT | Mod: PBBFAC,,, | Performed by: PSYCHIATRY & NEUROLOGY

## 2017-03-30 PROCEDURE — 3078F DIAST BP <80 MM HG: CPT | Mod: S$GLB,,, | Performed by: PSYCHIATRY & NEUROLOGY

## 2017-03-30 RX ORDER — BUPROPION HYDROCHLORIDE 150 MG/1
TABLET ORAL
Qty: 60 TABLET | Refills: 0 | Status: SHIPPED | OUTPATIENT
Start: 2017-03-30 | End: 2017-04-12 | Stop reason: DRUGHIGH

## 2017-03-30 RX ORDER — BUPROPION HYDROCHLORIDE 300 MG/1
300 TABLET ORAL DAILY
Qty: 30 TABLET | Refills: 2 | Status: ON HOLD | OUTPATIENT
Start: 2017-04-30 | End: 2017-04-17 | Stop reason: HOSPADM

## 2017-03-30 NOTE — MR AVS SNAPSHOT
[facility]  [address]  [facility] [address]  Phone: [phone]  Fax: [phone]                  Nalini Varma   3/30/2017 8:30 AM   Office Visit    Description:  Female : 1950   Provider:  Sirena Lebron MD   Department:  [facility]           Diagnoses this Visit        Comments    Tobacco dependence    -  Primary     MARY (generalized anxiety disorder)                To Do List           Future Appointments        Provider Department Dept Phone    2017 8:00 AM NURSE, [facility] INTERNAL MEDICINE [facility] [phone]    2017 9:00 AM LAB, SAME DAY [facility] [phone]    2017 10:00 AM [facility] CT OP LIMIT 450 L[address] [facility] [phone]    5/3/2017 10:00 AM Elina Wong MD [facility] [address] [phone]      Goals (5 Years of Data)     None      Follow-Up and Disposition     Return in about 3 months (around 2017).       These Medications        Disp Refills Start End    buPROPion (WELLBUTRIN XL) 150 MG TB24 tablet 60 tablet 0 3/30/2017     Start by taking 1 tablet x 3 days then increase to 2 tablets    Pharmacy: [facility]/pharmacy #[id number] [place], [place] - [address] Ph #: [phone]       buPROPion (WELLBUTRIN XL) 300 MG 24 hr tablet 30 tablet 2 2017    Take 1 tablet (300 mg total) by mouth once daily. - Oral    Pharmacy: [facility]/pharmacy #[id number] [place], [place] - [address] Ph #: [phone]         [facility]     [facility] On Call Nurse Care Line -  Assistance  Unless otherwise directed by your provider, please contact [facility], our nurse care line that is available for  assistance.     Registered nurses in the [facility] provide: appointment scheduling, clinical advisement, health education, and other advisory services.  Call: [phone] (toll free)               Medications           START  taking these NEW medications        Refills    buPROPion (WELLBUTRIN XL) 150 MG TB24 tablet 0    Sig: Start by taking 1 tablet x 3 days then increase to 2 tablets    Class: Normal    buPROPion (WELLBUTRIN XL) 300 MG 24 hr tablet 2    Starting on: 4/30/2017    Sig: Take 1 tablet (300 mg total) by mouth once daily.    Class: Normal    Route: Oral           Verify that the below list of medications is an accurate representation of the medications you are currently taking.  If none reported, the list may be blank. If incorrect, please contact your healthcare provider. Carry this list with you in case of emergency.           Current Medications     albuterol 90 mcg/actuation inhaler Inhale 2 puffs into the lungs every 6 (six) hours as needed for Wheezing.    alendronate (FOSAMAX) 70 MG tablet Take 1 tablet (70 mg total) by mouth every 7 days.    atorvastatin (LIPITOR) 40 MG tablet Take 1 tablet (40 mg total) by mouth once daily.    buPROPion (WELLBUTRIN XL) 150 MG TB24 tablet Start by taking 1 tablet x 3 days then increase to 2 tablets    buPROPion (WELLBUTRIN XL) 300 MG 24 hr tablet Starting on Apr 30, 2017. Take 1 tablet (300 mg total) by mouth once daily.    calcium carbonate (CALTRATE 600) 600 mg (1,500 mg) Tab Take 1 tablet (600 mg total) by mouth 2 (two) times daily with meals.    cyanocobalamin 1,000 mcg TbSR Take 1,000 mcg by mouth once daily.    fluticasone-salmeterol (ADVAIR HFA) 115-21 mcg/actuation HFAA Inhale 2 puffs into the lungs every 12 (twelve) hours.    hydrocodone-acetaminophen 10-325mg (NORCO)  mg Tab Take 1 tablet by mouth every 12 (twelve) hours as needed for Pain.    lisinopril (PRINIVIL,ZESTRIL) 40 MG tablet Take 1 tablet (40 mg total) by mouth once daily.    magnesium oxide 400 mg Cap Take 1 capsule by mouth once daily.    nitrofurantoin, macrocrystal-monohydrate, (MACROBID) 100 MG capsule Take 1 capsule (100 mg total) by mouth 2 (two) times daily.    pantoprazole (PROTONIX) 20 MG tablet  "Take 1 tablet (20 mg total) by mouth once daily.    tiotropium (SPIRIVA) 18 mcg inhalation capsule Inhale 1 capsule (18 mcg total) into the lungs once daily.    walker (ULTRA-LIGHT ROLLATOR) Misc 1 each by Misc.(Non-Drug; Combo Route) route once daily.           Clinical Reference Information           Your Vitals Were     BP Pulse Height             173/77 91 5' 6" (1.676 m)         Blood Pressure          Most Recent Value    BP  (!)  173/77      Allergies as of 3/30/2017     No Known Allergies      Immunizations Administered on Date of Encounter - 3/30/2017     None      Smoking Cessation     If you would like to quit smoking:   You may be eligible for free services if you are a [place] resident and started smoking cigarettes before September 1, 1988.  Call the Smoking Cessation Trust ([facility]) toll free at (899) 591-0624 or (818) 287-3219.   Call 1-800-QUIT-NOW if you do not meet the above criteria.   Contact us via email: [email or web address]   View our website for more information: [email or web address]        Language Assistance Services     ATTENTION: Language assistance services are available, free of charge. Please call [phone].      ATENCIÓN: Si habla español, tiene a gao disposición servicios gratuitos de asistencia lingüística. Llame al [phone].     LINDA Ý: N?u b?n nói Ti?ng Vi?t, có các d?ch v? h? tr? ngôn ng? mi?n phí dành cho b?n. G?i s? [phone].         [facility] complies with applicable Federal civil rights laws and does not discriminate on the basis of race, color, national origin, age, disability, or sex.        "

## 2017-03-30 NOTE — PROGRESS NOTES
"3/30/2017 8:46 AM   Nalini Varma   1950   [id number]           OUTPATIENT PSYCHIATRY FOLLOW- UP VISIT    Reason for Encounter:  Nalini Varma, a 66 y.o. female,who presents today for follow up of anxiety and adjustment problems. . Met with patient.    Interval History and Content of Current Session:  Today  Reports that she is no longer taking Zoloft because her Mg dropped. She states that when she was taking it for month but saw no difference in her mood. She reports that her mood and anxiety have not improved .She states her mood is down  primarily due to her living situation in a group home and financial difficulties. Denied any panic attacks  She reports that she is sleeping more and is tired as well. 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 Pt cheered up when talking about family and didn't seem anxious.    Substance use  Continues to smoke tobacco     Review of Systems       Past Medical, Family and Social History: The patient's past medical, family and social history have been reviewed and updated as appropriate within the electronic medical record - see encounter notes.    Compliance: no    Side effects: see above    Risk Parameters:  Patient reports no suicidal ideation  Patient reports no homicidal ideation  Patient reports no self-injurious behavior  Patient reports no violent behavior    Objective     Constitutional  Vitals:  Most recent vital signs, dated less than 90 days prior to this appointment, were reviewed.    Vitals:    03/30/17 0839   BP: (!) 173/77   Pulse: 91   Height: 5' 6" (1.676 m)        General:  younger than stated age, casually dressed, neatly groomed, overweight       Laboratory Data: reviewed    Medications:  Outpatient Encounter Prescriptions as of 3/30/2017   Medication Sig Dispense Refill    albuterol 90 mcg/actuation inhaler Inhale 2 puffs into the " lungs every 6 (six) hours as needed for Wheezing. 6.7 g 11    alendronate (FOSAMAX) 70 MG tablet Take 1 tablet (70 mg total) by mouth every 7 days. (Patient taking differently: Take 70 mg by mouth every 7 days. on Wednesday) 12 tablet 3    atorvastatin (LIPITOR) 40 MG tablet Take 1 tablet (40 mg total) by mouth once daily. 90 tablet 3    calcium carbonate (CALTRATE 600) 600 mg (1,500 mg) Tab Take 1 tablet (600 mg total) by mouth 2 (two) times daily with meals.  0    cyanocobalamin 1,000 mcg TbSR Take 1,000 mcg by mouth once daily. 90 tablet 1    fluticasone-salmeterol (ADVAIR HFA) 115-21 mcg/actuation HFAA Inhale 2 puffs into the lungs every 12 (twelve) hours. 36 g 3    hydrocodone-acetaminophen 10-325mg (NORCO)  mg Tab Take 1 tablet by mouth every 12 (twelve) hours as needed for Pain. 60 tablet 0    lisinopril (PRINIVIL,ZESTRIL) 40 MG tablet Take 1 tablet (40 mg total) by mouth once daily. 90 tablet 1    magnesium oxide 400 mg Cap Take 1 capsule by mouth once daily.      nitrofurantoin, macrocrystal-monohydrate, (MACROBID) 100 MG capsule Take 1 capsule (100 mg total) by mouth 2 (two) times daily. 10 capsule 0    pantoprazole (PROTONIX) 20 MG tablet Take 1 tablet (20 mg total) by mouth once daily. 90 tablet 3    tiotropium (SPIRIVA) 18 mcg inhalation capsule Inhale 1 capsule (18 mcg total) into the lungs once daily. 90 capsule 3    walker (ULTRA-LIGHT ROLLATOR) Misc 1 each by Misc.(Non-Drug; Combo Route) route once daily. 1 each 0     No facility-administered encounter medications on file as of 3/30/2017.        Allergy:  Review of patient's allergies indicates:  No Known Allergies    Nutritional Screening: Considering the patient's height and weight, medications, medical history and preferences, should a referral be made to the dietitian? no      Musculoskeletal  Muscle Strength/Tone:  no rigidity, no dystonia, no tremor   Gait & Station:  unsteady, uses walker   AIMS*      Mental Status  Exam:  Appearance: age appropriate, casually dressed, neatly groomed  Behavior/Cooperation:  appopriate friendly and cooperative  Speech: appropriatenormal tone, normal pitch, loud, spontaneous, rapid  Language: grossly intact with spontaneous speech  Mood: happy, euthymic  Affect:  congruent with mood and appropriate to situation/content Normal  Thought Process: circumstantial, tangential  Thought Content: normal, no suicidality, no homicidality, delusions, or paranoia  Sensorium:  Awake  Alert and Oriented: x3 grossly intact, person, month of year, year  Memory: intact   Recent:   Intact; able to report recent events   Remote:Intact  Attention/concentration: appropriate for age/education.  Insight: Limited  Judgment:Intact    Assessment and Diagnosis   Status/Progress: Based on the examination today, the patient's problem(s) is/are inadequately controlled.  New problems have been presented today.   Co-morbidities, Diagnostic uncertainty and Lack of compliance are complicating management of the primary condition.  There are no active rule-out diagnoses for this patient at this time.     General Impression:       ICD-10-CM ICD-9-CM   1. Tobacco dependence F17.200 305.1   2. MARY (generalized anxiety disorder) F41.1 300.02       Intervention/Counseling/Treatment Plan   · Medication Management: -   · Start Wellbutrin 150 XL x 3 days then increase to 300mg   · Pt d/c zoloft as it caused pt to have hypomagnesia   · Labs: reviewed   · The treatment plan and follow up plan were reviewed with the patient.  · Discussed with patient informed consent, risks vs. benefits, alternative treatments, side effect profile and the inherent unpredictability of individual responses to these treatments. The patient expresses understanding of the above and displays the capacity to agree with this current plan and had no other questions.  · Encouraged Patient to keep future appointments.   · Take medications as prescribed and abstain from  substance abuse.   · In the event of an emergency patient was advised to go to the emergency room.    Return to Clinic: 3 months    Coordination of care /Counseling time: > than 50% of total time was spend on this  Add on Psychotherapy time: 0  Total Face time:40mins    KIT CLARK MD   [facility]   3/30/2017 8:46 AM

## 2017-04-04 ENCOUNTER — TELEPHONE (OUTPATIENT)
Dept: INTERNAL MEDICINE | Facility: CLINIC | Age: 67
End: 2017-04-04

## 2017-04-04 NOTE — TELEPHONE ENCOUNTER
Leena states that she was calling to check if we received a faxed from  for PA on spiriva that not covered under the plan/ Leena states that Incruse is covered but pt states that she tried that rx and failed       Leena has been notified that once I received the form I will complete and have the PCP auth    No further action required

## 2017-04-04 NOTE — TELEPHONE ENCOUNTER
----- Message from Catrina Romo sent at 4/4/2017  2:01 PM CDT -----  Contact: pt  _  1st Request  _  2nd Request  _  3rd Request        Who: pt    Why: pt needs to get a urinalysis done. Please call the pt    What Number to Call Back:[phone]    When to Expect a call back: (Before the end of the day)   -- if the call is after 12:00, the call back will be tomorrow.

## 2017-04-04 NOTE — TELEPHONE ENCOUNTER
----- Message from Minda Viera sent at 4/4/2017 11:08 AM CDT -----  Contact: Leena with [employer] / # [phone] ext# [phone]  _  1st Request  X  2nd Request  _  3rd Request    Who: Leena with [employer]    Why: Please give a call back confirming prior authorization was received and completed for the medication / SPIRIVA.  Please do so at your earliest convenience.  THANKS!    What Number to Call Back:  [phone] / ext# [phone]    When to Expect a call back: (Before the end of the day)   -- if the call is after 12:00, the call back will be tomorrow.

## 2017-04-05 RX ORDER — LISINOPRIL 30 MG/1
30 TABLET ORAL DAILY
Refills: 3 | COMMUNITY
Start: 2017-03-01 | End: 2017-04-12 | Stop reason: ALTCHOICE

## 2017-04-05 NOTE — TELEPHONE ENCOUNTER
----- Message from Trudy Bowers sent at 4/5/2017  3:28 PM CDT -----  _  1st Request  _  2nd Request  _  3rd Request        Who: patient    Why: please call pt concerning her RX and Urine Culture.     What Number to Call Back:[phone]    When to Expect a call back: (Before the end of the day)   -- if the call is after 12:00, the call back will be tomorrow.

## 2017-04-06 NOTE — TELEPHONE ENCOUNTER
----- Message from Catrina Romo sent at 4/5/2017  4:03 PM CDT -----  Contact: pt  _  1st Request  _  2nd Request  _  3rd Request        Who: pt    Why: pt returned the nurse's phone call. Got disconnected    What Number to Call Back:[phone]    When to Expect a call back: (Before the end of the day)   -- if the call is after 12:00, the call back will be tomorrow.

## 2017-04-06 NOTE — TELEPHONE ENCOUNTER
Pt states that she is not having any of the symptoms listed below in the previous message from pcp. Pt has been informed of pcp advice and expresses verbalized understanding.

## 2017-04-06 NOTE — TELEPHONE ENCOUNTER
If she is having any urinary frequency, urgency, burning with urination, hematuria, foul smelling urine, cloudy urine, new or different lower back pain, suprapubic pain or pressure, fevers or chills, then rec repeat. If tolerated and completed antibiotic and having no symptoms above then no need to repeat urine studies at this time.    For documentation, urine was checked for hyponatremia eval - ua showed + clumped wbc - cx was then ordered but does not look like pt returned to submit sample for cx as instructed. abx given.

## 2017-04-06 NOTE — TELEPHONE ENCOUNTER
Pt would like to know if she should repeat her urine culture    LCV 03/28/17    Please auth/ advice

## 2017-04-09 ENCOUNTER — PATIENT MESSAGE (OUTPATIENT)
Dept: INTERNAL MEDICINE | Facility: CLINIC | Age: 67
End: 2017-04-09

## 2017-04-10 NOTE — TELEPHONE ENCOUNTER
Pt is requesting a refill on Norco. Pt states that her insurance company is requesting an Alternative to Spiriva (Incruse Ellipta) Please advise of appropriate rx. Please Advise LCV 3/28/2017

## 2017-04-11 ENCOUNTER — LAB VISIT (OUTPATIENT)
Dept: LAB | Facility: OTHER | Age: 67
End: 2017-04-11
Attending: INTERNAL MEDICINE
Payer: MEDICARE

## 2017-04-11 ENCOUNTER — TELEPHONE (OUTPATIENT)
Dept: INTERNAL MEDICINE | Facility: CLINIC | Age: 67
End: 2017-04-11

## 2017-04-11 ENCOUNTER — HOSPITAL ENCOUNTER (OUTPATIENT)
Dept: RADIOLOGY | Facility: OTHER | Age: 67
Discharge: HOME | End: 2017-04-11
Attending: INTERNAL MEDICINE

## 2017-04-11 DIAGNOSIS — D50.0 IRON DEFICIENCY ANEMIA DUE TO CHRONIC BLOOD LOSS: ICD-10-CM

## 2017-04-11 DIAGNOSIS — I10 HYPERTENSION, BENIGN: ICD-10-CM

## 2017-04-11 DIAGNOSIS — E78.5 HYPERLIPIDEMIA, UNSPECIFIED HYPERLIPIDEMIA TYPE: ICD-10-CM

## 2017-04-11 DIAGNOSIS — E87.1 HYPONATREMIA: ICD-10-CM

## 2017-04-11 DIAGNOSIS — F17.200 TOBACCO DEPENDENCE: ICD-10-CM

## 2017-04-11 LAB
ALBUMIN SERPL BCP-MCNC: 3.5 G/DL
ALP SERPL-CCNC: 70 U/L
ALT SERPL W/O P-5'-P-CCNC: 18 U/L
ANION GAP SERPL CALC-SCNC: 7 MMOL/L
AST SERPL-CCNC: 18 U/L
BASOPHILS # BLD AUTO: 0.02 K/UL
BASOPHILS NFR BLD: 0.2 %
BILIRUB SERPL-MCNC: 0.4 MG/DL
BUN SERPL-MCNC: 11 MG/DL
CALCIUM SERPL-MCNC: 8.8 MG/DL
CHLORIDE SERPL-SCNC: 88 MMOL/L
CHOLEST/HDLC SERPL: 2.3 {RATIO}
CO2 SERPL-SCNC: 26 MMOL/L
CREAT SERPL-MCNC: 0.8 MG/DL
DIFFERENTIAL METHOD: ABNORMAL
EOSINOPHIL # BLD AUTO: 0.2 K/UL
EOSINOPHIL NFR BLD: 2.1 %
ERYTHROCYTE [DISTWIDTH] IN BLOOD BY AUTOMATED COUNT: 13.9 %
EST. GFR  (AFRICAN AMERICAN): >60 ML/MIN/1.73 M^2
EST. GFR  (NON AFRICAN AMERICAN): >60 ML/MIN/1.73 M^2
GLUCOSE SERPL-MCNC: 78 MG/DL
HCT VFR BLD AUTO: 32.7 %
HDL/CHOLESTEROL RATIO: 43.1 %
HDLC SERPL-MCNC: 109 MG/DL
HDLC SERPL-MCNC: 47 MG/DL
HGB BLD-MCNC: 10.9 G/DL
LDLC SERPL CALC-MCNC: 45.2 MG/DL
LYMPHOCYTES # BLD AUTO: 1.8 K/UL
LYMPHOCYTES NFR BLD: 19.8 %
MCH RBC QN AUTO: 28.9 PG
MCHC RBC AUTO-ENTMCNC: 33.3 %
MCV RBC AUTO: 87 FL
MONOCYTES # BLD AUTO: 0.7 K/UL
MONOCYTES NFR BLD: 7.5 %
NEUTROPHILS # BLD AUTO: 6.5 K/UL
NEUTROPHILS NFR BLD: 70.2 %
NONHDLC SERPL-MCNC: 62 MG/DL
OSMOLALITY SERPL: 251 MOSM/KG
PLATELET # BLD AUTO: 313 K/UL
PMV BLD AUTO: 10.2 FL
POTASSIUM SERPL-SCNC: 4.7 MMOL/L
PROT SERPL-MCNC: 6.5 G/DL
RBC # BLD AUTO: 3.77 M/UL
SODIUM SERPL-SCNC: 121 MMOL/L
TRIGL SERPL-MCNC: 84 MG/DL
TSH SERPL DL<=0.005 MIU/L-ACNC: 0.4 UIU/ML
URATE SERPL-MCNC: 4.3 MG/DL
WBC # BLD AUTO: 9.2 K/UL

## 2017-04-11 PROCEDURE — 80053 COMPREHEN METABOLIC PANEL: CPT

## 2017-04-11 PROCEDURE — 80061 LIPID PANEL: CPT

## 2017-04-11 PROCEDURE — 84550 ASSAY OF BLOOD/URIC ACID: CPT

## 2017-04-11 PROCEDURE — 85025 COMPLETE CBC W/AUTO DIFF WBC: CPT

## 2017-04-11 PROCEDURE — 76497 UNLISTED CT PROCEDURE: CPT | Mod: TC

## 2017-04-11 PROCEDURE — 36415 COLL VENOUS BLD VENIPUNCTURE: CPT

## 2017-04-11 PROCEDURE — 84443 ASSAY THYROID STIM HORMONE: CPT

## 2017-04-11 PROCEDURE — 83036 HEMOGLOBIN GLYCOSYLATED A1C: CPT

## 2017-04-11 PROCEDURE — 83930 ASSAY OF BLOOD OSMOLALITY: CPT

## 2017-04-11 RX ORDER — HYDROCODONE BITARTRATE AND ACETAMINOPHEN 10; 325 MG/1; MG/1
1 TABLET ORAL EVERY 12 HOURS PRN
Qty: 60 TABLET | Refills: 0 | Status: SHIPPED | OUTPATIENT
Start: 2017-04-11 | End: 2017-05-10 | Stop reason: SDUPTHER

## 2017-04-11 NOTE — TELEPHONE ENCOUNTER
Called and lmovm x 2 to review labs with pt after no answer.   ER prompts given on message. Will try to reach pt monico AM as well.

## 2017-04-11 NOTE — TELEPHONE ENCOUNTER
Received critical lab value serum osmo of 251 from Geoff at  lab    Per PCP states will contact pt to f/u     TARAN 03/28/17    Please advise

## 2017-04-11 NOTE — TELEPHONE ENCOUNTER
----- Message from Lea Rivas sent at 3/29/2017  2:00 PM CDT -----  Contact: ELIEZER RODRIGUEZ [[id number]]    1st Request   _X  2nd Request   _  3rd Request         Who: ELIEZER RODRIGUEZ [[id number]]     Why: Patient is returning clinical staff phone call. Thanks!     What Number to Call Back: [phone]     When to Expect a call back: (Before the end of the day)   -- if the call is after 12:00, the call back will be tomorrow

## 2017-04-12 ENCOUNTER — TELEPHONE (OUTPATIENT)
Dept: INTERNAL MEDICINE | Facility: CLINIC | Age: 67
End: 2017-04-12

## 2017-04-12 ENCOUNTER — CLINICAL SUPPORT (OUTPATIENT)
Dept: INTERNAL MEDICINE | Facility: CLINIC | Age: 67
DRG: 645 | End: 2017-04-12
Payer: MEDICARE

## 2017-04-12 DIAGNOSIS — E83.42 HYPOMAGNESEMIA: ICD-10-CM

## 2017-04-12 DIAGNOSIS — R93.429 ABNORMAL CT SCAN, KIDNEY: ICD-10-CM

## 2017-04-12 DIAGNOSIS — R91.8 ABNORMAL CT LUNG SCREENING: ICD-10-CM

## 2017-04-12 DIAGNOSIS — E04.1 THYROID NODULE: ICD-10-CM

## 2017-04-12 DIAGNOSIS — E87.1 HYPONATREMIA: Primary | ICD-10-CM

## 2017-04-12 LAB
ESTIMATED AVG GLUCOSE: 114 MG/DL
HBA1C MFR BLD HPLC: 5.6 %

## 2017-04-12 RX ORDER — AMLODIPINE BESYLATE 5 MG/1
5 TABLET ORAL DAILY
Qty: 30 TABLET | Refills: 3 | Status: SHIPPED | OUTPATIENT
Start: 2017-04-12 | End: 2017-08-07 | Stop reason: SDUPTHER

## 2017-04-12 NOTE — TELEPHONE ENCOUNTER
Nalini Varma 66 y.o. female is here today for Blood Pressure check.   History of HTN yes.    Review of patient's allergies indicates:  No Known Allergies  Creatinine   Date Value Ref Range Status   04/11/2017 0.8 0.5 - 1.4 mg/dL Final     Sodium   Date Value Ref Range Status   04/11/2017 121 (L) 136 - 145 mmol/L Final     Potassium   Date Value Ref Range Status   04/11/2017 4.7 3.5 - 5.1 mmol/L Final   ]  Patient verifies taking blood pressure medications on a regular bases at the same time of the day.     Current Outpatient Prescriptions:     albuterol 90 mcg/actuation inhaler, Inhale 2 puffs into the lungs every 6 (six) hours as needed for Wheezing., Disp: 6.7 g, Rfl: 11    alendronate (FOSAMAX) 70 MG tablet, Take 1 tablet (70 mg total) by mouth every 7 days. (Patient taking differently: Take 70 mg by mouth every 7 days. on Wednesday), Disp: 12 tablet, Rfl: 3    atorvastatin (LIPITOR) 40 MG tablet, Take 1 tablet (40 mg total) by mouth once daily., Disp: 90 tablet, Rfl: 3    buPROPion (WELLBUTRIN XL) 150 MG TB24 tablet, Start by taking 1 tablet x 3 days then increase to 2 tablets, Disp: 60 tablet, Rfl: 0    [START ON 4/30/2017] buPROPion (WELLBUTRIN XL) 300 MG 24 hr tablet, Take 1 tablet (300 mg total) by mouth once daily., Disp: 30 tablet, Rfl: 2    calcium carbonate (CALTRATE 600) 600 mg (1,500 mg) Tab, Take 1 tablet (600 mg total) by mouth 2 (two) times daily with meals., Disp: , Rfl: 0    cyanocobalamin 1,000 mcg TbSR, Take 1,000 mcg by mouth once daily., Disp: 90 tablet, Rfl: 1    fluticasone-salmeterol (ADVAIR HFA) 115-21 mcg/actuation HFAA, Inhale 2 puffs into the lungs every 12 (twelve) hours., Disp: 36 g, Rfl: 3    hydrocodone-acetaminophen 10-325mg (NORCO)  mg Tab, Take 1 tablet by mouth every 12 (twelve) hours as needed for Pain., Disp: 60 tablet, Rfl: 0    lisinopril (PRINIVIL,ZESTRIL) 30 MG tablet, Take 30 mg by mouth once daily., Disp: , Rfl: 3    lisinopril (PRINIVIL,ZESTRIL) 40  MG tablet, Take 1 tablet (40 mg total) by mouth once daily., Disp: 90 tablet, Rfl: 1    magnesium oxide 400 mg Cap, Take 1 capsule by mouth once daily., Disp: , Rfl:     nitrofurantoin, macrocrystal-monohydrate, (MACROBID) 100 MG capsule, Take 1 capsule (100 mg total) by mouth 2 (two) times daily., Disp: 10 capsule, Rfl: 0    pantoprazole (PROTONIX) 20 MG tablet, Take 1 tablet (20 mg total) by mouth once daily., Disp: 90 tablet, Rfl: 3    umeclidinium 62.5 mcg/actuation DsDv, Inhale 1 puff into the lungs once daily. Controller, Disp: 30 each, Rfl: 11    walker (ULTRA-LIGHT ROLLATOR) Misc, 1 each by Misc.(Non-Drug; Combo Route) route once daily., Disp: 1 each, Rfl: 0  Does patient have record of home blood pressure readings no.   Last dose of blood pressure medication was taken at 730am.  Patient is asymptomatic.   Complains of nothing at this time.      ,  .    Blood pressure reading after 15 minutes was 152/74, Pulse 88.  Dr. Sahni notified.

## 2017-04-12 NOTE — TELEPHONE ENCOUNTER
Note copied from Nurse visit bp note as well.   Pt here for bp eval. rec stop smoking! Will add amlodipine 5mg. potential SE discussed with pt along with ER and RTC prompts    Reviewed labs and CT scan with pt - serum osmols decreased as well as worsening of hypoNa and chloride. She declines ER eval for worsening fatigue over past month and abnormal labs.   Will order repeat labs for monico AM and order thyroid us, renal us and refer to renal for hyponatremia eval all to which pt agrees. will repeat CT lung in 3 months 2/2 to pt's tobacco hx    Pt reports feeling tired over past week but thinks this is due to poor sleep. Reports has been drinking less. No fevers or chills, no confusion, urinary frequency, urgency, burning with urination, hematuria, foul smelling urine, cloudy urine, lower back pain, suprapubic pain or pressure.     Completed course of abx for uti seen on ua recently. Appetite is good. No cp or palpitations. COPD stable and nelson inhalers well. Still smoking.     Over past few months ssri was stopped after hyponatremia worsened when this was started and hctz was stopped. Sodium has not improved to prior baseline. TSH in normal range. No ETOH intake. Stress test in 2016 wnl. No cp , orthopnea. parox noct dyspnea. Has intermittent cough that is chronic and unchanged. No changes in sputum or production, no fevers. She reports not staying well hydrated over past few weeks    rec she go to ER for inc fatigue over past week or so and eval of hyponatremia. Suspect vol depletion with low Na and chloride at this point with likely SIADH  Pt declines ER at this time. Agrees to trial of Pedialyte today and monico morning with plans to repeat labs early monico. She is alert and oriented x3 and in no acute distress. Talking in complete sentences and nontoxic appearing. She agrees to go to ER if fatigue worsens, is unable to nelson PO, develops confusion or any other major medical issues.     Please schedule labs for tom April  13, 2017 for 10am and renal f/u in 1-2 weeks.   Please schedule thyroid us and renal us in next few weeks.  Please schedule CT lung in 3 months

## 2017-04-12 NOTE — TELEPHONE ENCOUNTER
Pt here for bp eval.  rec stop smoking! Will add amlodipine 5mg. potential SE discussed with pt along with ER and RTC prompts  Reviewed labs and CT scan with pt - serum osmols decreased as well as worsening of hypoNa and chloride   She declines ER eval for worsening fatigue over past month and abnormal labs.   Will order repeat labs for monico AM and order thyroid us, renal us and refer to renal for hyponatremia eval all to which pt agrees    Pt reports feeling tired over past week but thinks this is due to poor sleep. Reports has been drinking less. No fevers or chills, no confusion, urinary frequency, urgency, burning with urination, hematuria, foul smelling urine, cloudy urine, lower back pain, suprapubic pain or pressure.     Completed course of abx for uti seen on ua recently. Appetite is good. No cp or palpitations. COPD stable and nelson inhalers well. Still smoking.     Over past few months ssri was stopped after hyponatremia worsened when this was started and hctz was stopped. Sodium has not improved to prior baseline. TSH in normal range. No ETOH intake. Stress test in 2016 wnl. No cp , orthopnea. parox noct dyspnea. Has intermittent cough that is chronic and unchanged. No changes in sputum or production, no fevers. She reports not staying well hydrated over past few weeks    rec she go to ER for inc fatigue over past week or so and eval of hyponatremia. Suspect vol depletion with low Na and chloride at this point with likely SIADH  Pt declines ER at this time. Agrees to trail of Pedialyte today and monico morning with plans to repeat labs early monico. She is alert and oriented and in no acute distress. Talking in complete sentences and nontoxic appearing. She agrees to go to ER if fatigue worsens, is unable to nelson PO, develops confusion or any other major medical issues.

## 2017-04-13 ENCOUNTER — HOSPITAL ENCOUNTER (INPATIENT)
Facility: OTHER | Age: 67
LOS: 2 days | Discharge: HOME | DRG: 645 | End: 2017-04-17
Attending: EMERGENCY MEDICINE | Admitting: EMERGENCY MEDICINE
Payer: MEDICARE

## 2017-04-13 ENCOUNTER — TELEPHONE (OUTPATIENT)
Dept: INTERNAL MEDICINE | Facility: CLINIC | Age: 67
End: 2017-04-13

## 2017-04-13 ENCOUNTER — TELEPHONE (OUTPATIENT)
Dept: FAMILY MEDICINE | Facility: CLINIC | Age: 67
End: 2017-04-13

## 2017-04-13 DIAGNOSIS — E87.1 ACUTE HYPONATREMIA: Primary | ICD-10-CM

## 2017-04-13 DIAGNOSIS — K21.9 GASTROESOPHAGEAL REFLUX DISEASE WITHOUT ESOPHAGITIS: ICD-10-CM

## 2017-04-13 DIAGNOSIS — E87.1 HYPONATREMIA: ICD-10-CM

## 2017-04-13 PROBLEM — R91.1 PULMONARY NODULE: Status: ACTIVE | Noted: 2017-04-13

## 2017-04-13 PROBLEM — E04.1 THYROID NODULE: Status: ACTIVE | Noted: 2017-04-13

## 2017-04-13 LAB
ALBUMIN SERPL BCP-MCNC: 3.2 G/DL
ALP SERPL-CCNC: 70 U/L
ALT SERPL W/O P-5'-P-CCNC: 17 U/L
ANION GAP SERPL CALC-SCNC: 9 MMOL/L
AST SERPL-CCNC: 18 U/L
BACTERIA #/AREA URNS HPF: NORMAL /HPF
BASOPHILS # BLD AUTO: 0.01 K/UL
BASOPHILS NFR BLD: 0.1 %
BILIRUB SERPL-MCNC: 0.4 MG/DL
BILIRUB UR QL STRIP: NEGATIVE
BUN SERPL-MCNC: 14 MG/DL
CALCIUM SERPL-MCNC: 8.3 MG/DL
CHLORIDE SERPL-SCNC: 86 MMOL/L
CLARITY UR: CLEAR
CO2 SERPL-SCNC: 23 MMOL/L
COLOR UR: YELLOW
CREAT SERPL-MCNC: 0.8 MG/DL
DIFFERENTIAL METHOD: ABNORMAL
EOSINOPHIL # BLD AUTO: 0.2 K/UL
EOSINOPHIL NFR BLD: 1.6 %
ERYTHROCYTE [DISTWIDTH] IN BLOOD BY AUTOMATED COUNT: 13.7 %
EST. GFR  (AFRICAN AMERICAN): >60 ML/MIN/1.73 M^2
EST. GFR  (NON AFRICAN AMERICAN): >60 ML/MIN/1.73 M^2
GLUCOSE SERPL-MCNC: 100 MG/DL
GLUCOSE UR QL STRIP: NEGATIVE
HCT VFR BLD AUTO: 31.6 %
HGB BLD-MCNC: 10.8 G/DL
HGB UR QL STRIP: NEGATIVE
HYALINE CASTS #/AREA URNS LPF: 1 /LPF
KETONES UR QL STRIP: NEGATIVE
LEUKOCYTE ESTERASE UR QL STRIP: NEGATIVE
LYMPHOCYTES # BLD AUTO: 1.8 K/UL
LYMPHOCYTES NFR BLD: 17.9 %
MCH RBC QN AUTO: 29.4 PG
MCHC RBC AUTO-ENTMCNC: 34.2 %
MCV RBC AUTO: 86 FL
MICROSCOPIC COMMENT: NORMAL
MONOCYTES # BLD AUTO: 0.7 K/UL
MONOCYTES NFR BLD: 7 %
NEUTROPHILS # BLD AUTO: 7.5 K/UL
NEUTROPHILS NFR BLD: 73.1 %
NITRITE UR QL STRIP: NEGATIVE
PH UR STRIP: 7 [PH] (ref 5–8)
PLATELET # BLD AUTO: 291 K/UL
PMV BLD AUTO: 9.1 FL
POTASSIUM SERPL-SCNC: 5 MMOL/L
PROT SERPL-MCNC: 6.2 G/DL
PROT UR QL STRIP: ABNORMAL
RBC # BLD AUTO: 3.67 M/UL
RBC #/AREA URNS HPF: 1 /HPF (ref 0–4)
SODIUM SERPL-SCNC: 118 MMOL/L
SP GR UR STRIP: 1.01 (ref 1–1.03)
SQUAMOUS #/AREA URNS HPF: 1 /HPF
URN SPEC COLLECT METH UR: ABNORMAL
UROBILINOGEN UR STRIP-ACNC: NEGATIVE EU/DL
WBC # BLD AUTO: 10.2 K/UL
WBC #/AREA URNS HPF: 1 /HPF (ref 0–5)

## 2017-04-13 PROCEDURE — 25000003 PHARM REV CODE 250: Performed by: EMERGENCY MEDICINE

## 2017-04-13 PROCEDURE — G0378 HOSPITAL OBSERVATION PER HR: HCPCS

## 2017-04-13 PROCEDURE — 96360 HYDRATION IV INFUSION INIT: CPT

## 2017-04-13 PROCEDURE — 80053 COMPREHEN METABOLIC PANEL: CPT

## 2017-04-13 PROCEDURE — 99223 1ST HOSP IP/OBS HIGH 75: CPT | Mod: AI,,, | Performed by: INTERNAL MEDICINE

## 2017-04-13 PROCEDURE — 63600175 PHARM REV CODE 636 W HCPCS: Performed by: PHYSICIAN ASSISTANT

## 2017-04-13 PROCEDURE — 99284 EMERGENCY DEPT VISIT MOD MDM: CPT | Mod: 25

## 2017-04-13 PROCEDURE — 25000003 PHARM REV CODE 250: Performed by: PHYSICIAN ASSISTANT

## 2017-04-13 PROCEDURE — 85025 COMPLETE CBC W/AUTO DIFF WBC: CPT

## 2017-04-13 PROCEDURE — 25000003 PHARM REV CODE 250: Performed by: INTERNAL MEDICINE

## 2017-04-13 PROCEDURE — 81000 URINALYSIS NONAUTO W/SCOPE: CPT

## 2017-04-13 RX ORDER — FLUTICASONE FUROATE AND VILANTEROL 100; 25 UG/1; UG/1
1 POWDER RESPIRATORY (INHALATION) DAILY
Status: DISCONTINUED | OUTPATIENT
Start: 2017-04-14 | End: 2017-04-17 | Stop reason: HOSPADM

## 2017-04-13 RX ORDER — PANTOPRAZOLE SODIUM 40 MG/1
40 TABLET, DELAYED RELEASE ORAL DAILY
Status: DISCONTINUED | OUTPATIENT
Start: 2017-04-14 | End: 2017-04-13

## 2017-04-13 RX ORDER — LANOLIN ALCOHOL/MO/W.PET/CERES
1000 CREAM (GRAM) TOPICAL DAILY
Status: DISCONTINUED | OUTPATIENT
Start: 2017-04-14 | End: 2017-04-17 | Stop reason: HOSPADM

## 2017-04-13 RX ORDER — ONDANSETRON 2 MG/ML
4 INJECTION INTRAMUSCULAR; INTRAVENOUS EVERY 12 HOURS PRN
Status: DISCONTINUED | OUTPATIENT
Start: 2017-04-13 | End: 2017-04-17 | Stop reason: HOSPADM

## 2017-04-13 RX ORDER — LANOLIN ALCOHOL/MO/W.PET/CERES
400 CREAM (GRAM) TOPICAL DAILY
Status: DISCONTINUED | OUTPATIENT
Start: 2017-04-14 | End: 2017-04-15

## 2017-04-13 RX ORDER — AMLODIPINE BESYLATE 5 MG/1
5 TABLET ORAL ONCE
Status: COMPLETED | OUTPATIENT
Start: 2017-04-13 | End: 2017-04-13

## 2017-04-13 RX ORDER — ATORVASTATIN CALCIUM 20 MG/1
40 TABLET, FILM COATED ORAL DAILY
Status: DISCONTINUED | OUTPATIENT
Start: 2017-04-14 | End: 2017-04-17 | Stop reason: HOSPADM

## 2017-04-13 RX ORDER — AMLODIPINE BESYLATE 5 MG/1
10 TABLET ORAL DAILY
Status: DISCONTINUED | OUTPATIENT
Start: 2017-04-14 | End: 2017-04-17 | Stop reason: HOSPADM

## 2017-04-13 RX ORDER — ACETAMINOPHEN 325 MG/1
650 TABLET ORAL EVERY 8 HOURS PRN
Status: DISCONTINUED | OUTPATIENT
Start: 2017-04-13 | End: 2017-04-17 | Stop reason: HOSPADM

## 2017-04-13 RX ORDER — IPRATROPIUM BROMIDE AND ALBUTEROL SULFATE 2.5; .5 MG/3ML; MG/3ML
3 SOLUTION RESPIRATORY (INHALATION) EVERY 4 HOURS PRN
Status: DISCONTINUED | OUTPATIENT
Start: 2017-04-13 | End: 2017-04-17 | Stop reason: HOSPADM

## 2017-04-13 RX ORDER — AMLODIPINE BESYLATE 5 MG/1
5 TABLET ORAL DAILY
Status: DISCONTINUED | OUTPATIENT
Start: 2017-04-14 | End: 2017-04-13

## 2017-04-13 RX ORDER — HYDROCODONE BITARTRATE AND ACETAMINOPHEN 10; 325 MG/1; MG/1
1 TABLET ORAL EVERY 12 HOURS PRN
Status: DISCONTINUED | OUTPATIENT
Start: 2017-04-13 | End: 2017-04-17 | Stop reason: HOSPADM

## 2017-04-13 RX ORDER — HYDRALAZINE HYDROCHLORIDE 20 MG/ML
10 INJECTION INTRAMUSCULAR; INTRAVENOUS EVERY 8 HOURS PRN
Status: DISCONTINUED | OUTPATIENT
Start: 2017-04-13 | End: 2017-04-17 | Stop reason: HOSPADM

## 2017-04-13 RX ORDER — FAMOTIDINE 20 MG/1
20 TABLET, FILM COATED ORAL 2 TIMES DAILY
Status: DISCONTINUED | OUTPATIENT
Start: 2017-04-13 | End: 2017-04-17 | Stop reason: HOSPADM

## 2017-04-13 RX ORDER — LISINOPRIL 20 MG/1
40 TABLET ORAL DAILY
Status: DISCONTINUED | OUTPATIENT
Start: 2017-04-14 | End: 2017-04-17 | Stop reason: HOSPADM

## 2017-04-13 RX ORDER — AMOXICILLIN 250 MG
1 CAPSULE ORAL 2 TIMES DAILY PRN
Status: DISCONTINUED | OUTPATIENT
Start: 2017-04-13 | End: 2017-04-17 | Stop reason: HOSPADM

## 2017-04-13 RX ADMIN — FAMOTIDINE 20 MG: 20 TABLET, FILM COATED ORAL at 11:04

## 2017-04-13 RX ADMIN — AMLODIPINE BESYLATE 5 MG: 5 TABLET ORAL at 10:04

## 2017-04-13 RX ADMIN — SODIUM CHLORIDE 1000 ML: 0.9 INJECTION, SOLUTION INTRAVENOUS at 01:04

## 2017-04-13 RX ADMIN — HYDRALAZINE HYDROCHLORIDE 10 MG: 20 INJECTION INTRAMUSCULAR; INTRAVENOUS at 11:04

## 2017-04-13 NOTE — IP AVS SNAPSHOT
[facility] Location ([facility])  [address] [place] [address]  Phone: [phone]           Patient Discharge Instructions   Our goal is to set you up for success. This packet includes information on your condition, medications, and your home care.  It will help you care for yourself to prevent having to return to the hospital.     Please ask your nurse if you have any questions.      There are many details to remember when preparing to leave the hospital. Here is what you will need to do:    1. Take your medicine. If you are prescribed medications, review your Medication List on the following pages. You may have new medications to  at the pharmacy and others that you'll need to stop taking. Review the instructions for how and when to take your medications. Talk with your doctor or nurses if you are unsure of what to do.     2. Go to your follow-up appointments. Specific follow-up information is listed in the following pages. Your may be contacted by a nurse or clinical provider about future appointments. Be sure we have all of the phone numbers to reach you. Please contact your provider's office if you are unable to make an appointment.     3. Watch for warning signs. Your doctor or nurse will give you detailed warning signs to watch for and when to call for assistance. These instructions may also include educational information about your condition. If you experience any of warning signs to your health, call your doctor.               ** Verify the list of medication(s) below is accurate and up to date. Carry this with you in case of emergency. If your medications have changed, please notify your healthcare provider.             Medication List      START taking these medications        Additional Info                      famotidine 20 MG tablet   Commonly known as:  PEPCID   Quantity:  60 tablet   Refills:  5   Dose:  20 mg    Last time this was given:  20 mg on 4/17/2017  9:54 AM    Instructions:  Take 1 tablet (20 mg total) by mouth 2 (two) times daily.     Begin Date    AM    Noon    PM    Bedtime         CHANGE how you take these medications        Additional Info                      alendronate 70 MG tablet   Commonly known as:  FOSAMAX   Quantity:  12 tablet   Refills:  3   Dose:  70 mg   What changed:  additional instructions    Instructions:  Take 1 tablet (70 mg total) by mouth every 7 days.     Begin Date    AM    Noon    PM    Bedtime         CONTINUE taking these medications        Additional Info                      albuterol 90 mcg/actuation inhaler   Quantity:  6.7 g   Refills:  11   Dose:  2 puff    Instructions:  Inhale 2 puffs into the lungs every 6 (six) hours as needed for Wheezing.     Begin Date    AM    Noon    PM    Bedtime       amlodipine 5 MG tablet   Commonly known as:  NORVASC   Quantity:  30 tablet   Refills:  3   Dose:  5 mg    Last time this was given:  10 mg on 4/17/2017  9:54 AM   Instructions:  Take 1 tablet (5 mg total) by mouth once daily.     Begin Date    AM    Noon    PM    Bedtime       atorvastatin 40 MG tablet   Commonly known as:  LIPITOR   Quantity:  90 tablet   Refills:  3   Dose:  40 mg    Last time this was given:  40 mg on 4/17/2017  9:54 AM   Instructions:  Take 1 tablet (40 mg total) by mouth once daily.     Begin Date    AM    Noon    PM    Bedtime       calcium carbonate 600 mg (1,500 mg) Tab   Commonly known as:  CALTRATE 600   Refills:  0   Dose:  600 mg    Instructions:  Take 1 tablet (600 mg total) by mouth 2 (two) times daily with meals.     Begin Date    AM    Noon    PM    Bedtime       cyanocobalamin (vitamin B-12) 1,000 mcg Tbsr   Quantity:  90 tablet   Refills:  1   Dose:  1000 mcg    Instructions:  Take 1,000 mcg by mouth once daily.     Begin Date    AM    Noon    PM    Bedtime       fluticasone-salmeterol 115-21 mcg/actuation Hfaa   Commonly known as:  ADVAIR HFA   Quantity:  36 g   Refills:  3   Dose:  2 puff    Instructions:   Inhale 2 puffs into the lungs every 12 (twelve) hours.     Begin Date    AM    Noon    PM    Bedtime       hydrocodone-acetaminophen 10-325mg  mg Tab   Commonly known as:  NORCO   Quantity:  60 tablet   Refills:  0   Dose:  1 tablet    Last time this was given:  1 tablet on 4/17/2017  9:53 AM   Instructions:  Take 1 tablet by mouth every 12 (twelve) hours as needed for Pain.     Begin Date    AM    Noon    PM    Bedtime       lisinopril 40 MG tablet   Commonly known as:  PRINIVIL,ZESTRIL   Quantity:  90 tablet   Refills:  1   Dose:  40 mg    Last time this was given:  40 mg on 4/17/2017  9:54 AM   Instructions:  Take 1 tablet (40 mg total) by mouth once daily.     Begin Date    AM    Noon    PM    Bedtime       magnesium oxide 400 mg Cap   Refills:  0   Dose:  1 capsule    Instructions:  Take 1 capsule by mouth once daily.     Begin Date    AM    Noon    PM    Bedtime       umeclidinium 62.5 mcg/actuation Dsdv   Quantity:  30 each   Refills:  11   Dose:  1 puff    Instructions:  Inhale 1 puff into the lungs once daily. Controller     Begin Date    AM    Noon    PM    Bedtime       walker Misc   Commonly known as:  ULTRA-LIGHT ROLLATOR   Quantity:  1 each   Refills:  0   Dose:  1 each    Instructions:  1 each by Misc.(Non-Drug; Combo Route) route once daily.     Begin Date    AM    Noon    PM    Bedtime         STOP taking these medications     buPROPion 300 MG 24 hr tablet   Commonly known as:  WELLBUTRIN XL       pantoprazole 20 MG tablet   Commonly known as:  PROTONIX            Where to Get Your Medications      These medications were sent to [facility]/pharmacy #[id number] - [place], [place] - [address]  [address], [place] [address]     Phone:  [phone]     famotidine 20 MG tablet                  Please bring to all follow up appointments:    1. A copy of your discharge instructions.  2. All medicines you are currently taking in their original bottles.  3. Identification and insurance  card.    Please arrive 15 minutes ahead of scheduled appointment time.    Please call 24 hours in advance if you must reschedule your appointment and/or time.        Your Scheduled Appointments     Apr 19, 2017 10:30 AM CDT   Us Thyroid with [facility] USOP2   [facility]    [address] [facility] [address]   [phone]            Apr 19, 2017 11:15 AM CDT   Us Retroper with [facility] USOP2   [facility]    [address] [facility] [address]   [phone]            Apr 26, 2017 10:30 AM CDT   Consult with Roxanne Gee MD   [facility] - Neph[facility] ([facility] )    [address]  [place] [address]   [phone]            May 03, 2017 10:00 AM CDT   Consult with Elina Wong MD   [facility] [address] ([facility])    [address] [facility] [address]   [phone]            Jul 13, 2017 10:30 AM CDT   CT CHEST W/OUT CONTRAST with [facility] CT OP LIMIT 450 LBS   [facility]    [facility]  [place] [address]   [phone]                Discharge Instructions     Future Orders    Activity as tolerated     Diet general     Questions:    Total calories:      Fat restriction, if any:      Protein restriction, if any:      Na restriction, if any:      Fluid restriction:  Fluid - 1000mL    Additional restrictions:          Discharge Instructions       Your feedback is important to us.  If you should receive a survey in the next few days, please share your experience with us.        Discharge References/Attachments     HYPONATREMIA (ENGLISH)    HYPONATREMIA, DISCHARGE INSTRUCTIONS (ENGLISH)    SODIUM (BLOOD) (ENGLISH)        Primary Diagnosis     Your primary diagnosis was:  Low Sodium Levels      Admission Information     Date & Time Provider Department CSN    4/13/2017 12:41 PM Arnav Pandya MD [facility] [id number]  "     Care Providers     Provider Role Specialty Primary office phone    Arnav Pandya MD Attending Provider Hospitalist [phone]    Marty Stephens Jr., MD Consulting Physician  Nephrology [phone]      Important Medicare Message          Most Recent Value    Important Message from Medicare Regarding Discharge Appeal Rights  Given to patient/caregiver, Explained to patient/caregiver, Signed/date by patient/caregiver yes 04/17/2017 0916      Your Vitals Were     BP Pulse Temp Resp Height Weight    163/75 (BP Location: Right arm, Patient Position: Lying, BP Method: Automatic) 67 97.1 °F (36.2 °C) (Oral) 18 5' 6" (1.676 m) 90.7 kg (200 lb)    SpO2 BMI             97% 32.28 kg/m2         Recent Lab Values        2/11/2016 4/11/2017                        9:14 AM  9:00 AM          A1C 5.5 5.6          Comment for A1C at  9:00 AM on 4/11/2017:  According to ADA guidelines, hemoglobin A1C <7.0% represents  optimal control in non-pregnant diabetic patients.  Different  metrics may apply to specific populations.   Standards of Medical Care in Diabetes - 2016.  For the purpose of screening for the presence of diabetes:  <5.7%     Consistent with the absence of diabetes  5.7-6.4%  Consistent with increasing risk for diabetes   (prediabetes)  >or=6.5%  Consistent with diabetes  Currently no consensus exists for use of hemoglobin A1C  for diagnosis of diabetes for children.        Allergies as of 4/17/2017     No Known Allergies      [facility]     [facility] Nurse Care Line - 24/7 Assistance  Unless otherwise directed by your provider, please contact [facility], our nurse care line that is available for 24/7 assistance.     Registered nurses in the [facility] provide clinical advisement, health education, appointment booking, and other advisory services.  Call for this free service at [phone].        Advance Directives     An advance directive is a document which, in the event you " are no longer able to make decisions for yourself, tells your healthcare team what kind of treatment you do or do not want to receive, or who you would like to make those decisions for you.  If you do not currently have an advance directive, [facility] encourages you to create one.  For more information call:  [phone] ([phone]), [phone] ([phone]),  or log on to [email or web address].        Smoking Cessation     If you would like to quit smoking:   You may be eligible for free services if you are a [place] resident and started smoking cigarettes before September 1, 1988.  Call the Smoking Cessation Trust (SCT) toll free at (054) 522-4124 or (827) 464-5560.   Call 5-459-QUIT-NOW if you do not meet the above criteria.   Contact us via email: [email or web address]   View our website for more information: [email or web address]        Language Assistance Services     ATTENTION: Language assistance services are available, free of charge. Please call [phone].      ATENCIÓN: Si habla español, tiene a gao disposición servicios gratuitos de asistencia lingüística. Llame al [phone].     CHÚ Ý: N?u b?n nói Ti?ng Vi?t, có các d?ch v? h? tr? ngôn ng? mi?n phí dành cho b?n. G?i s? [phone].         [facility] complies with applicable Federal civil rights laws and does not discriminate on the basis of race, color, national origin, age, disability, or sex.

## 2017-04-13 NOTE — ED PROVIDER NOTES
Encounter Date: 4/13/2017    SCRIBE #1 NOTE: I, Nicky Vitale, am scribing for, and in the presence of, Dr. Meyers.       History     Chief Complaint   Patient presents with    Abnormal Lab     sent by Kaitlyn SOLOMON for hyponatremia, reports nausea x1 week     Review of patient's allergies indicates:  No Known Allergies  HPI Comments: Time seen by provider: 12:52 PM    This is a 66 y.o. female who presents to the ED on the referral of Dr. Sahni with complaint of hyponatremia.  The patient endorses fatigue and dizziness that has periststed for the past week.  She also mentions intermittent nausea, vomiting, and decreased urinary frequency.  She reports no identifying, alleviating, or exacerbating factors.  The patient reports history of HTN, HLD, COPD, and MARY, for which she takes medication.  She states that she is compliant with her medication regimen.  She mentions that she stopped taking Zoloft and hydrochlorothiazide approximately 3 months ago.  She denies alcohol consumption.  The patient reports that she has been drinking coffee, water, and Pedialyte.  Today, she states that she has had 4 cups of coffee and a small cup of water.  She reports that she has an appointment with a nephrologist in 2 weeks.    The history is provided by the patient.     Past Medical History:   Diagnosis Date    Allergy     Anemia due to gastrointestinal blood loss     LESLY from gastric ulcer due to chronic nsaid use    Anxiety     Arthritis     Gastric ulcer     H/O knee surgery 2001    right    Hx of psychiatric care     Hyperlipidemia     Hypertension     Psychiatric problem     Therapy     Tobacco abuse      Past Surgical History:   Procedure Laterality Date    COLONOSCOPY  2015    ESOPHAGOGASTRODUODENOSCOPY  2015    FRACTURE SURGERY      left femur    JOINT REPLACEMENT  2007    left knee x 2, 2nd performed 2/2 to MRSA infection 3 months after 1st surgery    ORIF FEMUR FRACTURE Left      Family History   Problem  Relation Age of Onset    Hypertension Mother     Alzheimer's disease Mother     Dementia Mother     Depression Mother     Myasthenia gravis Father     Arthritis Father     Cancer Brother 59     colon    Hypertension Brother     Arthritis Brother     No Known Problems Son     Cancer Sister      brain    Melanoma Neg Hx      Social History   Substance Use Topics    Smoking status: Current Every Day Smoker     Packs/day: 1.50     Last attempt to quit: 2/3/1967    Smokeless tobacco: None    Alcohol use Yes      Comment: socially, last had cocktails 2-3 weeks ago      Review of Systems   Constitutional: Positive for fatigue. Negative for chills and fever.   HENT: Negative for facial swelling.    Respiratory: Negative for shortness of breath.    Cardiovascular: Negative for chest pain.   Gastrointestinal: Positive for nausea and vomiting. Negative for abdominal pain.   Endocrine: Negative for polyuria.   Genitourinary: Positive for frequency (decreased). Negative for dysuria.   Musculoskeletal: Negative for myalgias.   Skin: Negative for rash.   Neurological: Positive for dizziness. Negative for headaches.       Physical Exam   Initial Vitals   BP Pulse Resp Temp SpO2   04/13/17 1221 04/13/17 1221 04/13/17 1221 04/13/17 1221 04/13/17 1221   113/67 77 16 97.6 °F (36.4 °C) 95 %     Physical Exam    Nursing note and vitals reviewed.  Constitutional: She appears well-developed and well-nourished. She is not diaphoretic. No distress.   Patient's belongings bag include multiple coffee cups and other drink cups, approximately 6.  Obese.   HENT:   Head: Normocephalic and atraumatic.   Right Ear: External ear normal.   Left Ear: External ear normal.   Mouth/Throat: Mucous membranes are dry.   Eyes: EOM are normal. Right eye exhibits no discharge. Left eye exhibits no discharge.   No pallor or icterus.   Neck: Normal range of motion.   Cardiovascular: Normal rate, regular rhythm and normal heart sounds. Exam reveals  no gallop and no friction rub.    No murmur heard.  Pulmonary/Chest: Breath sounds normal. No respiratory distress. She has no wheezes. She has no rhonchi. She has no rales.   Abdominal: Soft. There is no tenderness. There is no rebound and no guarding.   Musculoskeletal: Normal range of motion. She exhibits no edema or tenderness.   Neurological: She is alert and oriented to person, place, and time.   Skin: Skin is warm and dry. No rash and no abscess noted. No erythema. No pallor.   Psychiatric: She has a normal mood and affect. Her behavior is normal. Judgment and thought content normal.         ED Course   Critical Care  Date/Time: 4/13/2017 8:52 PM  Performed by: FARAZ REEDER II  Authorized by: THELMA WOODSON   Direct patient critical care time: 15 minutes  Additional history critical care time: 8 minutes  Ordering / reviewing critical care time: 10 minutes  Documentation critical care time: 10 minutes  Consulting other physicians critical care time: 5 minutes  Total critical care time (exclusive of procedural time) : 48 minutes  Critical care was necessary to treat or prevent imminent or life-threatening deterioration of the following conditions: metabolic crisis, endocrine crisis and dehydration.        Labs Reviewed   CBC W/ AUTO DIFFERENTIAL - Abnormal; Notable for the following:        Result Value    RBC 3.67 (*)     Hemoglobin 10.8 (*)     Hematocrit 31.6 (*)     MPV 9.1 (*)     Gran% 73.1 (*)     Lymph% 17.9 (*)     All other components within normal limits   COMPREHENSIVE METABOLIC PANEL - Abnormal; Notable for the following:     Sodium 118 (*)     Chloride 86 (*)     Calcium 8.3 (*)     Albumin 3.2 (*)     All other components within normal limits    Narrative:       Sodium critical result(s) called and verbal readback obtained from   Lion Velasquez RN, ED, 04/13/2017 15:43  Recoll. [id number] by [email or web address] at 04/13/2017 14:23, reason: notified   Angelica Batres RN, ED   URINALYSIS - Abnormal;  Notable for the following:     Protein, UA 2+ (*)     All other components within normal limits   URINALYSIS MICROSCOPIC      Imaging Results     None                  Medical Decision Making:   Clinical Tests:   Lab Tests: Ordered and Reviewed  Other:   I have discussed this case with another health care provider.       <> Summary of the Discussion: 2:51 PM.  Discussed with Dr. Pandya, who will admit the patient.            Scribe Attestation:   Scribe #1: I performed the above scribed service and the documentation accurately describes the services I performed. I attest to the accuracy of the note.    Attending Attestation:           Physician Attestation for Scribe:  Physician Attestation Statement for Scribe #1: I, Dr. Meyers, reviewed documentation, as scribed by Nicky Vitale in my presence, and it is both accurate and complete.                 ED Course     Patient presents after recent outpatient laboratory studies showed worsening of her long-standing/chronic hyponatremia.  She had already had Zoloft, hydrochlorothiazide discontinued several months ago.  However she continued to have hyponatremia and recently has been complaining of fatigue malaise and tiredness.  Recent laboratory studies showed a sodium of 121, lower than previous which ranged 126-130.  Repeat today showed a sodium even lower at 116 along with hypochloremia.  Of note, in the patient's belongings are noted numerous beverage containers.  Although the patient denied it, this raises the question of potomania/polydipsia and excess fluid intake.    Fluid repletion with sodium chloride has begun.  Case discussed with the hospitalist.  She'll be admitted for fluid restriction, gradual sodium repletion and serial electrolytes    Clinical Impression:     1. Acute hyponatremia                Abdirashid Meyers II, MD  04/13/17 2056

## 2017-04-13 NOTE — ED NOTES
Lab called to notify the nurse taking care of patient Avani that a CMP needs to be recollected. Asked if a phlebotomist can be sent down to draw the recollect.

## 2017-04-13 NOTE — TELEPHONE ENCOUNTER
----- Message from Matt Parks sent at 4/13/2017  1:13 PM CDT -----  Contact: Olya in the [facility]  x_ 1st Request   _ 2nd Request   _ 3rd Request     Who: ELIEZER RODRIGUEZ [[id number]]    Why: Olya in the lab is requesting a call back in reference to reporting critical value    What Number to Call Back: ext [phone]    When to Expect a call back: (Before the end of the day)   -- if call after 3:00 call back will be tomorrow.

## 2017-04-13 NOTE — ED NOTES
Received report from Татьяна RN, assumed care of pt at this time, RR easy non labored, NAD. Negative signs of distress or pain noted or reported, pt currently eating dinner without complication, side rails up x2, call light within reach, bed in lowest locked position, will continue to monitor for changes

## 2017-04-13 NOTE — ED TRIAGE NOTES
Pt with low sodium on lab draws from 4/11 NA was 121. Has osmolality drawn and was 451. States feeling really tired and sleepy. States now feeling dizzy so cam to ED

## 2017-04-13 NOTE — H&P
History & Physical  Hospital Medicine      SUBJECTIVE:     Chief Complaint/Reason for Admission: hyponatremia    History of Present Illness:  Ms. Nalini Varma is a 66 y.o. who was sent by PCP Dr Sahni because of worsening hyponatremia. She has been undergoing an evaluation for this which so far has been unrevealing. She was previously on Zoloft and HCTZ both of which were stopped in Jan.  She continues to take wellbutrin.    She denies excessive water or beer consumption.     She has a >40 pack year hx of smoking and still smokes 1 PPD. A ct scan did not show a definitive mass but did show 4 very small upper lobe nodules all less than 1 cm.  The patient endorses fatigue and dizziness that has periststed for the past week. She also mentions intermittent nausea, vomiting, and decreased urinary frequency    (Not in a hospital admission)     Review of patient's allergies indicates:  No Known Allergies    Past Medical History:   Diagnosis Date    Allergy     Anemia due to gastrointestinal blood loss     LESLY from gastric ulcer due to chronic nsaid use    Anxiety     Arthritis     Gastric ulcer     H/O knee surgery 2001    right    Hx of psychiatric care     Hyperlipidemia     Hypertension     Psychiatric problem     Therapy     Tobacco abuse        Past Surgical History:   Procedure Laterality Date    COLONOSCOPY  2015    ESOPHAGOGASTRODUODENOSCOPY  2015    FRACTURE SURGERY      left femur    JOINT REPLACEMENT  2007    left knee x 2, 2nd performed 2/2 to MRSA infection 3 months after 1st surgery    ORIF FEMUR FRACTURE Left        Family History   Problem Relation Age of Onset    Hypertension Mother     Alzheimer's disease Mother     Dementia Mother     Depression Mother     Myasthenia gravis Father     Arthritis Father     Cancer Brother 59     colon    Hypertension Brother     Arthritis Brother     No Known Problems Son     Cancer Sister      brain    Melanoma Neg Hx         Social  History     Social History    Marital status: Single     Spouse name: N/A    Number of children: 2    Years of education: N/A     Occupational History    unemployed      Social History Main Topics    Smoking status: Current Every Day Smoker     Packs/day: 1.50     Last attempt to quit: 2/3/1967    Smokeless tobacco: None    Alcohol use Yes      Comment: socially, last had cocktails 2-3 weeks ago     Drug use: No    Sexual activity: Not Currently     Birth control/ protection: Abstinence     Other Topics Concern    Are You Pregnant Or Think You May Be? No     Social History Narrative    Originally from [facility] in [facility] since 1998    Living in [facility]       Review of Systems:  Constitutional: No fever or chills, no weight changes.  Eyes: No visual changes or photophobia  HEENT: No nasal congestion or sore throat  Respiratory: No cough or shorness of breath  Cardiovascular: No chest pain or palpitations  Gastrointestinal: No nausea or vomiting, no diarrhea or change in bowel habits  Genitourinary: No hematuria or dysuria  Skin: No rash or pruritis  Hematologic/lymphatic: No easy bruising, bleeding or lymphadenopathy  Musculoskeletal: No arthralgias or myalgias  Neurological: No seizures or tremors  Endocrine: No heat/cold intolerance.  No polyuria/polydipsia  Psychiatric:  No depression or anxiety.      OBJECTIVE:     Temp:  [97.6 °F (36.4 °C)] 97.6 °F (36.4 °C)  Pulse:  [70-77] 72  Resp:  [16] 16  SpO2:  [95 %-100 %] 96 %  BP: (113-177)/(67-81) 158/76  Body mass index is 32.28 kg/(m^2).     Physical Exam:  General appearance: Well developed, well nourished  Head: Normocephalic, atraumatic  Eyes:  Conjunctivae nl. Sclera anicteric. PERRL.  HEENT: Lips, mucosa, and tongue normal; teeth and gums normal and oropharynx clear.  Neck: Supple, trachea midline, no thyromegaly.  Lungs: Clear to auscultation bilaterally and normal respiratory effort  Heart: Regular rate and rhythm, S1, S2 normal, no  murmur, click, rub or gallop  Abdomen: Soft, non-tender, non-distended; bowel sounds normal; no masses, no organomegaly  Extremities: No cyanosis or clubbing. No edema  Pulses: 2+ and symmetric  Skin: Skin color, texture, turgor normal. No rashes or lesions  Lymph nodes: Cervical, supraclavicular, and axillary nodes normal.  Neurologic: Normal strength and tone. No focal numbness or weakness  Psychiatric:  Alert and oriented times 3.  Affect appropriate.      Laboratory: Reviewed and noted  where applicable- Please see chart for full lab data.    BMP:   Recent Labs  Lab 04/13/17  1012   GLU 85   *   K 5.3*   CL 84*   CO2 25   BUN 14   CREATININE 0.8   CALCIUM 8.7   MG 1.3*     Nl TSH    CBC:   Recent Labs  Lab 04/13/17  1356   WBC 10.20   RBC 3.67*   HGB 10.8*   HCT 31.6*      MCV 86   MCH 29.4   MCHC 34.2         Diagnostic Tests:  CT chest:    Mild emphysematous changes.    Four sub-solid nodular groundglass opacities within the upper lobes bilaterally, with the largest measuring 10 mm. These may be inflammatory in nature, but followup in 3-6 months with a routine CT of the chest is recommended via Fleischner society guidelines. No solid nodules identified.    1.3 cm left thyroid nodule. Recommend dedicated thyroid ultrasound examination for further characterization.    Probable right renal cyst, could be confirmed with a renal ultrasound is appropriate.    Large joint effusion with intra-articular ossific loose bodies in the partially visualized left shoulder.    3.7 cm diverticulum.    ASSESSMENT/PLAN:     Active Hospital Problems    Diagnosis  POA    *Hyponatremia [E87.1]  Yes    Thyroid nodule [E04.1]  Yes    Pulmonary nodule [R91.1]  Yes    Chronic obstructive pulmonary disease [J44.9]  Yes    Tobacco dependence [F17.200]  Yes    Hypertension, benign [I10]  Yes      Resolved Hospital Problems    Diagnosis Date Resolved POA   No resolved problems to display.     Acute on chronic  hyponatremia  -w/u so far unrevealing  -zoloft and HCTZ discontinued in Jan  -nl TSH  -Four sub-solid nodular groundglass opacities within the upper lobes bilaterally in this smoker are of uncertain significance but need repeat imaging to exclude developing malignancy  -urine and serum OSM pending  -stop wellbutrin  -nephrology consult  -fluid restrict  -check AM cortisol    COPD / emphysema  -nebs prn    HTn  -continue norvasc and monitor    Tobacco use  -encouraged cessation    Thyroid nodule  -outpatient w/u    VTE prophylaxis  -low risk, ambulate  -teds/scds

## 2017-04-14 LAB
ALBUMIN SERPL BCP-MCNC: 3 G/DL
ANION GAP SERPL CALC-SCNC: 7 MMOL/L
ANION GAP SERPL CALC-SCNC: 8 MMOL/L
BUN SERPL-MCNC: 12 MG/DL
BUN SERPL-MCNC: 13 MG/DL
CALCIUM SERPL-MCNC: 8.4 MG/DL
CALCIUM SERPL-MCNC: 8.5 MG/DL
CHLORIDE SERPL-SCNC: 92 MMOL/L
CHLORIDE SERPL-SCNC: 93 MMOL/L
CHLORIDE UR-SCNC: 71 MMOL/L
CO2 SERPL-SCNC: 22 MMOL/L
CO2 SERPL-SCNC: 24 MMOL/L
CORTIS SERPL-MCNC: 12.5 UG/DL
CREAT SERPL-MCNC: 0.7 MG/DL
CREAT SERPL-MCNC: 0.8 MG/DL
CREAT UR-MCNC: 26.1 MG/DL
EST. GFR  (AFRICAN AMERICAN): >60 ML/MIN/1.73 M^2
EST. GFR  (AFRICAN AMERICAN): >60 ML/MIN/1.73 M^2
EST. GFR  (NON AFRICAN AMERICAN): >60 ML/MIN/1.73 M^2
EST. GFR  (NON AFRICAN AMERICAN): >60 ML/MIN/1.73 M^2
GLUCOSE SERPL-MCNC: 139 MG/DL
GLUCOSE SERPL-MCNC: 90 MG/DL
MAGNESIUM SERPL-MCNC: 1.2 MG/DL
OSMOLALITY UR: 261 MOSM/KG
PHOSPHATE SERPL-MCNC: 3.5 MG/DL
POTASSIUM SERPL-SCNC: 4.1 MMOL/L
POTASSIUM SERPL-SCNC: 4.6 MMOL/L
SODIUM SERPL-SCNC: 123 MMOL/L
SODIUM SERPL-SCNC: 123 MMOL/L
SODIUM UR-SCNC: 70 MMOL/L
URATE SERPL-MCNC: 4.4 MG/DL

## 2017-04-14 PROCEDURE — 25000242 PHARM REV CODE 250 ALT 637 W/ HCPCS: Performed by: INTERNAL MEDICINE

## 2017-04-14 PROCEDURE — 25000003 PHARM REV CODE 250: Performed by: NURSE PRACTITIONER

## 2017-04-14 PROCEDURE — 99233 SBSQ HOSP IP/OBS HIGH 50: CPT | Mod: ,,, | Performed by: INTERNAL MEDICINE

## 2017-04-14 PROCEDURE — G0378 HOSPITAL OBSERVATION PER HR: HCPCS

## 2017-04-14 PROCEDURE — 94640 AIRWAY INHALATION TREATMENT: CPT

## 2017-04-14 PROCEDURE — 36415 COLL VENOUS BLD VENIPUNCTURE: CPT

## 2017-04-14 PROCEDURE — 99900035 HC TECH TIME PER 15 MIN (STAT)

## 2017-04-14 PROCEDURE — 80069 RENAL FUNCTION PANEL: CPT

## 2017-04-14 PROCEDURE — 82570 ASSAY OF URINE CREATININE: CPT

## 2017-04-14 PROCEDURE — 83935 ASSAY OF URINE OSMOLALITY: CPT

## 2017-04-14 PROCEDURE — 84300 ASSAY OF URINE SODIUM: CPT

## 2017-04-14 PROCEDURE — 82533 TOTAL CORTISOL: CPT

## 2017-04-14 PROCEDURE — 84550 ASSAY OF BLOOD/URIC ACID: CPT

## 2017-04-14 PROCEDURE — 25000003 PHARM REV CODE 250: Performed by: INTERNAL MEDICINE

## 2017-04-14 PROCEDURE — 82436 ASSAY OF URINE CHLORIDE: CPT

## 2017-04-14 PROCEDURE — 80048 BASIC METABOLIC PNL TOTAL CA: CPT

## 2017-04-14 PROCEDURE — 25000003 PHARM REV CODE 250: Performed by: PHYSICIAN ASSISTANT

## 2017-04-14 PROCEDURE — 83735 ASSAY OF MAGNESIUM: CPT

## 2017-04-14 PROCEDURE — 63600175 PHARM REV CODE 636 W HCPCS: Performed by: NURSE PRACTITIONER

## 2017-04-14 RX ORDER — IBUPROFEN 200 MG
1 TABLET ORAL DAILY
Status: DISCONTINUED | OUTPATIENT
Start: 2017-04-14 | End: 2017-04-17 | Stop reason: HOSPADM

## 2017-04-14 RX ADMIN — FLUTICASONE FUROATE AND VILANTEROL TRIFENATATE 1 PUFF: 100; 25 POWDER RESPIRATORY (INHALATION) at 07:04

## 2017-04-14 RX ADMIN — LISINOPRIL 40 MG: 20 TABLET ORAL at 08:04

## 2017-04-14 RX ADMIN — Medication 400 MG: at 08:04

## 2017-04-14 RX ADMIN — CYANOCOBALAMIN TAB 1000 MCG 1000 MCG: 1000 TAB at 08:04

## 2017-04-14 RX ADMIN — IPRATROPIUM BROMIDE AND ALBUTEROL SULFATE 3 ML: .5; 3 SOLUTION RESPIRATORY (INHALATION) at 05:04

## 2017-04-14 RX ADMIN — ATORVASTATIN CALCIUM 40 MG: 20 TABLET, FILM COATED ORAL at 08:04

## 2017-04-14 RX ADMIN — AMLODIPINE BESYLATE 10 MG: 5 TABLET ORAL at 08:04

## 2017-04-14 RX ADMIN — FAMOTIDINE 20 MG: 20 TABLET, FILM COATED ORAL at 08:04

## 2017-04-14 RX ADMIN — HYDROCODONE BITARTRATE AND ACETAMINOPHEN 1 TABLET: 10; 325 TABLET ORAL at 09:04

## 2017-04-14 RX ADMIN — MAGNESIUM SULFATE HEPTAHYDRATE 3 G: 500 INJECTION, SOLUTION INTRAMUSCULAR; INTRAVENOUS at 10:04

## 2017-04-14 RX ADMIN — FAMOTIDINE 20 MG: 20 TABLET, FILM COATED ORAL at 09:04

## 2017-04-14 RX ADMIN — NICOTINE 1 PATCH: 21 PATCH, EXTENDED RELEASE TRANSDERMAL at 10:04

## 2017-04-14 NOTE — PROGRESS NOTES
Progress Note  Hospital Medicine    Admit Date: 4/13/2017   LOS: 0 days     SUBJECTIVE:     Follow-up For:  Hyponatremia    Interval History:       Says she feels better    Story changed somewhat as she told nephrology that she drinks 2.5L of fluid a day      Review of Systems:  Constitutional: No fever or chills  Respiratory: No cough or shorness of breath  Cardiovascular: No chest pain or palpitations  Gastrointestinal: No nausea or vomiting  Neurological: No confusion or weakness    OBJECTIVE:     Vital Signs Range (Last 24H):  Vitals:    04/14/17 0746   BP:    Pulse: 85   Resp: 16   Temp:        Temp:  [97.6 °F (36.4 °C)-98.2 °F (36.8 °C)]   Pulse:  [70-90]   Resp:  [16-18]   BP: (113-196)/(67-92)   SpO2:  [93 %-100 %]     I & O (Last 24H):No intake or output data in the 24 hours ending 04/14/17 1033    Physical Exam:  General appearance: Well developed, well nourished  Eyes:  Conjunctivae/corneas clear. PERRL.  Lungs: Normal respiratory effort,   clear to auscultation bilaterally   Heart: Regular rate and rhythm, S1, S2 normal, no murmur, rub or dionte.  Abdomen: Soft, non-tender non-distended; bowel sounds normal; no masses,  no organomegaly  Extremities: No cyanosis or clubbing. No edema.    Skin: Skin color, texture, turgor normal. No rashes or lesions  Neurologic: Normal strength and tone. No focal numbness or weakness     Laboratory Data:  Reviewed and noted  where applicable- Please see chart for full lab data.    Medications:  Medication list was reviewed and changes noted under Assessment/Plan.    Diagnostic Results:  AM cortisol 12.5      ASSESSMENT/PLAN:     Active Hospital Problems    Diagnosis  POA    *Hyponatremia [E87.1]  Yes    Thyroid nodule [E04.1]  Yes    Pulmonary nodule [R91.1]  Yes    Acute hyponatremia [E87.1]  Yes    Chronic obstructive pulmonary disease [J44.9]  Yes    Tobacco dependence [F17.200]  Yes    Hypertension, benign [I10]  Yes      Resolved Hospital Problems     Diagnosis Date Resolved POA   No resolved problems to display.   1.       Acute on chronic Hyponatremia with suspected underlying SIADH :   Awaiting further urine studies today.   Continue with fluid restriction   Suspect multifactorial -meds and excessive water intake    Hypomag - replace    HTN  -cont meds    Abnormal Chest CT in a smoker  Four sub-solid nodular groundglass opacities within the upper lobes bilaterally in this smoker are of uncertain significance but need repeat imaging to exclude developing malignancy    COPD  -quiscent  -nebs prn  -breo    Vte: ambulate  teds/scds    Arnav Pandya MD  c- [phone]  p- [phone]

## 2017-04-14 NOTE — ED NOTES
Hourly rounding completed on pt, pt denies any pain at this time. Pt ambulates to restroom with assistance from walker. Bed in lowest, locked position, side rails up x 2. Call light within reach.

## 2017-04-14 NOTE — PLAN OF CARE
Problem: Patient Care Overview  Goal: Plan of Care Review  Outcome: Ongoing (interventions implemented as appropriate)  Patient received on RA SAT 94%, Will continue to monitor. Daily inhaler given.

## 2017-04-14 NOTE — CONSULTS
Consult Note  Nephrology    Consult Requested By: Arnav Pandya MD  Reason for Consult: Hyponatremia    SUBJECTIVE:     History of Present Illness:  Patient is a 66 y.o. female presents with acute on chronic hyponatremia from outpt clinic.  EPIC reviewed and pt has had -130 and her HCTZ and zoloft were DC'd.  Pt has recently started on wellbutrin and had 1 day of N/V/D.  Outpt labs revealed Na of 117 and was directed to come to ED.  1L saline given and pt placed on water restriction 800cc/day.  Na increased to 123.      Consulted for evaluation.  Case discussed in detail with attending and treatment team.  Pt seen and examined.  Pt admits to drinking about 2.5 Liters of fluid a day (water, sprite, coffee).      Denies CP/N/V/D/F/C.  Has chronic SOB due to tobacco usage/asthma.  Discussed with son at bedside.     Past Medical History:   Diagnosis Date    Allergy     Anemia due to gastrointestinal blood loss     LESLY from gastric ulcer due to chronic nsaid use    Anxiety     Arthritis     Gastric ulcer     H/O knee surgery 2001    right    Hx of psychiatric care     Hyperlipidemia     Hypertension     Hyponatremia     Psychiatric problem     Therapy     Tobacco abuse      Past Surgical History:   Procedure Laterality Date    COLONOSCOPY  2015    ESOPHAGOGASTRODUODENOSCOPY  2015    FRACTURE SURGERY      left femur    JOINT REPLACEMENT  2007    left knee x 2, 2nd performed 2/2 to MRSA infection 3 months after 1st surgery    ORIF FEMUR FRACTURE Left      Family History   Problem Relation Age of Onset    Hypertension Mother     Alzheimer's disease Mother     Dementia Mother     Depression Mother     Myasthenia gravis Father     Arthritis Father     Cancer Brother 59     colon    Hypertension Brother     Arthritis Brother     No Known Problems Son     Cancer Sister      brain    Melanoma Neg Hx      Social History   Substance Use Topics    Smoking status: Current Every Day Smoker      Packs/day: 1.50     Last attempt to quit: 2/3/1967    Smokeless tobacco: None    Alcohol use Yes      Comment: socially, last had cocktails 2-3 weeks ago        Review of patient's allergies indicates:  No Known Allergies     Review of Systems:    See HPI    OBJECTIVE:     Vital Signs (Most Recent)  Temp: 98.2 °F (36.8 °C) (04/14/17 0743)  Pulse: 85 (04/14/17 0746)  Resp: 16 (04/14/17 0746)  BP: (!) 175/80 (04/14/17 0743)  SpO2: (!) 94 % (04/14/17 0746)    Vital Signs Range (Last 24H):  Temp:  [97.6 °F (36.4 °C)-98.2 °F (36.8 °C)]   Pulse:  [70-90]   Resp:  [16-18]   BP: (113-196)/(67-92)   SpO2:  [93 %-100 %]     Physical Exam:  General appearance: Well developed, well nourished  Eyes:  Conjunctivae/corneas clear. PERRL.  Lungs: Normal respiratory effort,   Scattered rhonchi/faint exp wheeze  Heart: Regular rate and rhythm, S1, S2 normal, no murmur, rub or dionte.  Abdomen: Soft, non-tender non-distended; bowel sounds normal; no masses,  no organomegaly  Extremities: No cyanosis or clubbing. trace edema.    Skin: Skin color, texture, turgor normal. No rashes or lesions  Neurologic: Normal strength and tone. No focal numbness or weakness       Laboratory:    Recent Labs  Lab 04/13/17  1356   WBC 10.20   RBC 3.67*   HGB 10.8*   HCT 31.6*      MCV 86   MCH 29.4   MCHC 34.2     BMP:   Recent Labs  Lab 04/14/17  0517   GLU 90   *   K 4.6   CL 93*   CO2 22*   BUN 12   CREATININE 0.7   CALCIUM 8.5*   MG 1.2*     Lab Results   Component Value Date    CALCIUM 8.5 (L) 04/14/2017    PHOS 3.5 04/14/2017         Diagnostic Results:    Mild emphysematous changes.    Four sub-solid nodular groundglass opacities within the upper lobes bilaterally, with the largest measuring 10 mm. These may be inflammatory in nature, but followup in 3-6 months with a routine CT of the chest is recommended via Fleischner society guidelines. No solid nodules identified.    1.3 cm left thyroid nodule. Recommend dedicated thyroid  ultrasound examination for further characterization.    Probable right renal cyst, could be confirmed with a renal ultrasound is appropriate.    Large joint effusion with intra-articular ossific loose bodies in the partially visualized left shoulder.    3.7 cm diverticulum.    ASSESSMENT/PLAN:     1. Acute on chronic Hyponatremia with suspected underlying SIADH (E87.1,  E22.2):  Records reviewed and Urine Osm 481 noted back on 3/28.  Ct chest noted.  Suspect Wellbutrin also playing a role. Awaiting further urine studies today.  Continue with fluid restriction 1L/day.  May need samsca based on findings.  Follow trends Q12 and cautious correction of 6-8 mmol/day.  Renally dose meds, avoid nephrotoxins, and monitor I/O's closely.  2. Hypomagnesemia likely from chronic PPI (E83.42):  Replace IV/PO.  Avoid PPI.  Use H2 blocker if needed. Mix IV in saline.   3. HTN (I10):  Continue as now.   4. Tobacco dependency (F17.200):  Nicotine patch.  5. Thyroid nodule (E04.1):  Check U/S.     Thanks for consult  Will follow  See orders/recs.

## 2017-04-14 NOTE — PLAN OF CARE
04/14/17 1133   Discharge Assessment   Assessment Type Discharge Planning Assessment   Confirmed/corrected address and phone number on facesheet? Yes   Assessment information obtained from? Patient;Medical Record   Prior to hospitilization cognitive status: Alert/Oriented   Prior to hospitalization functional status: Independent   Current cognitive status: Alert/Oriented   Current Functional Status: Independent   Lives With alone   Able to Return to Prior Arrangements unable to determine at this time (comments)   Is patient able to care for self after discharge? Unable to determine at this time (comments)   On Dialysis? No   Discharge Plan A Shelter   Discharge Plan B Home   Patient/Family In Agreement With Plan yes

## 2017-04-15 LAB
ANION GAP SERPL CALC-SCNC: 7 MMOL/L
ANION GAP SERPL CALC-SCNC: 8 MMOL/L
BUN SERPL-MCNC: 13 MG/DL
BUN SERPL-MCNC: 17 MG/DL
CALCIUM SERPL-MCNC: 8.5 MG/DL
CALCIUM SERPL-MCNC: 8.9 MG/DL
CHLORIDE SERPL-SCNC: 96 MMOL/L
CHLORIDE SERPL-SCNC: 96 MMOL/L
CO2 SERPL-SCNC: 23 MMOL/L
CO2 SERPL-SCNC: 26 MMOL/L
CREAT SERPL-MCNC: 0.8 MG/DL
CREAT SERPL-MCNC: 0.9 MG/DL
EST. GFR  (AFRICAN AMERICAN): >60 ML/MIN/1.73 M^2
EST. GFR  (AFRICAN AMERICAN): >60 ML/MIN/1.73 M^2
EST. GFR  (NON AFRICAN AMERICAN): >60 ML/MIN/1.73 M^2
EST. GFR  (NON AFRICAN AMERICAN): >60 ML/MIN/1.73 M^2
GLUCOSE SERPL-MCNC: 110 MG/DL
GLUCOSE SERPL-MCNC: 91 MG/DL
MAGNESIUM SERPL-MCNC: 1.4 MG/DL
POTASSIUM SERPL-SCNC: 4.5 MMOL/L
POTASSIUM SERPL-SCNC: 4.9 MMOL/L
SODIUM SERPL-SCNC: 126 MMOL/L
SODIUM SERPL-SCNC: 130 MMOL/L

## 2017-04-15 PROCEDURE — 25000003 PHARM REV CODE 250: Performed by: INTERNAL MEDICINE

## 2017-04-15 PROCEDURE — 11000001 HC ACUTE MED/SURG PRIVATE ROOM

## 2017-04-15 PROCEDURE — 99900035 HC TECH TIME PER 15 MIN (STAT)

## 2017-04-15 PROCEDURE — 36415 COLL VENOUS BLD VENIPUNCTURE: CPT

## 2017-04-15 PROCEDURE — 94640 AIRWAY INHALATION TREATMENT: CPT

## 2017-04-15 PROCEDURE — 80048 BASIC METABOLIC PNL TOTAL CA: CPT

## 2017-04-15 PROCEDURE — 25000003 PHARM REV CODE 250: Performed by: PHYSICIAN ASSISTANT

## 2017-04-15 PROCEDURE — 83735 ASSAY OF MAGNESIUM: CPT

## 2017-04-15 PROCEDURE — 25000003 PHARM REV CODE 250: Performed by: NURSE PRACTITIONER

## 2017-04-15 RX ORDER — METOPROLOL SUCCINATE 50 MG/1
50 TABLET, EXTENDED RELEASE ORAL DAILY
Status: DISCONTINUED | OUTPATIENT
Start: 2017-04-15 | End: 2017-04-17 | Stop reason: HOSPADM

## 2017-04-15 RX ORDER — LANOLIN ALCOHOL/MO/W.PET/CERES
400 CREAM (GRAM) TOPICAL 2 TIMES DAILY
Status: DISCONTINUED | OUTPATIENT
Start: 2017-04-15 | End: 2017-04-17 | Stop reason: HOSPADM

## 2017-04-15 RX ADMIN — CYANOCOBALAMIN TAB 1000 MCG 1000 MCG: 1000 TAB at 07:04

## 2017-04-15 RX ADMIN — METOPROLOL SUCCINATE 50 MG: 50 TABLET, EXTENDED RELEASE ORAL at 07:04

## 2017-04-15 RX ADMIN — Medication 400 MG: at 08:04

## 2017-04-15 RX ADMIN — LISINOPRIL 40 MG: 20 TABLET ORAL at 07:04

## 2017-04-15 RX ADMIN — NICOTINE 1 PATCH: 21 PATCH, EXTENDED RELEASE TRANSDERMAL at 07:04

## 2017-04-15 RX ADMIN — FAMOTIDINE 20 MG: 20 TABLET, FILM COATED ORAL at 07:04

## 2017-04-15 RX ADMIN — FAMOTIDINE 20 MG: 20 TABLET, FILM COATED ORAL at 08:04

## 2017-04-15 RX ADMIN — Medication 400 MG: at 07:04

## 2017-04-15 RX ADMIN — FLUTICASONE FUROATE AND VILANTEROL TRIFENATATE 1 PUFF: 100; 25 POWDER RESPIRATORY (INHALATION) at 07:04

## 2017-04-15 RX ADMIN — ATORVASTATIN CALCIUM 40 MG: 20 TABLET, FILM COATED ORAL at 07:04

## 2017-04-15 RX ADMIN — HYDROCODONE BITARTRATE AND ACETAMINOPHEN 1 TABLET: 10; 325 TABLET ORAL at 04:04

## 2017-04-15 RX ADMIN — AMLODIPINE BESYLATE 10 MG: 5 TABLET ORAL at 07:04

## 2017-04-15 NOTE — PROGRESS NOTES
Progress Note  Nephrology    Admit Date: 4/13/2017   LOS: 0 days     SUBJECTIVE:     Follow-up For: Hyponatremia    Interval History: Adhering to fluid restriction.  No complaints.  Na trending up.  Elevated BPs overnight.  Recorded UOP 1350ml.    OBJECTIVE:     Vital Signs (Most Recent)  Temp: 98.3 °F (36.8 °C) (04/15/17 1110)  Pulse: 69 (04/15/17 1110)  Resp: 18 (04/15/17 1110)  BP: 139/71 (04/15/17 1110)  SpO2: 96 % (04/15/17 1110)    Vital Signs Range (Last 24H):  Temp:  [97.3 °F (36.3 °C)-98.6 °F (37 °C)]   Pulse:  [69-91]   Resp:  [17-18]   BP: (136-180)/(63-83)   SpO2:  [94 %-96 %]     Review of Systems:   Constitutional: no fever or chills   Respiratory: no cough or shorness of breath   Cardiovascular: no chest pain or palpitations   Gastrointestinal: no nausea or vomiting, no abdominal pain or change in bowel habits   Genitourinary: no hematuria or dysuria   Musculoskeletal: no arthralgias or myalgias   Neurological: no seizures or tremors    Physical Exam:  Gen: AAOx3, NAD  HEENT: mmm, sclera anicteric  CV: RRR, no m/r  Resp: scattered rhonchi at bases  GI: soft, obese, NTTP, +BS  Skin: warm, dry  Extr: no edema    Laboratory:    Recent Labs  Lab 04/14/17  0517 04/14/17  1602 04/15/17  0524   * 123* 126*   K 4.6 4.1 4.5   CL 93* 92* 96   CO2 22* 24 23   BUN 12 13 13   CREATININE 0.7 0.8 0.8   CALCIUM 8.5* 8.4* 8.5*   PHOS 3.5  --   --        Recent Labs  Lab 04/11/17  0900 04/13/17  1356   WBC 9.20 10.20   HGB 10.9* 10.8*   HCT 32.7* 31.6*    291        Diagnostic Results:  Labs: Reviewed  X-Ray: Reviewed  US: Reviewed    ASSESSMENT/PLAN:     1. Acute on chronic Hyponatremia with suspected underlying SIADH (E87.1, E22.2): CT chest noted. Suspect Wellbutrin also playing a role. Continue with fluid restriction 1L/day. May need Tolvaptan as an outpatient.  based on findings. Cautious correction of 6-8 mmol/day. Renally dose meds, avoid nephrotoxins, and monitor I/O's closely.  Check BMP  q12.  2. Hypomagnesemia likely from chronic PPI (E83.42): Replace IV/PO. Avoid PPI. Use H2 blocker if needed.   3. HTN (I10): Added Toprol XL 50mg/d.  4. Tobacco dependency (F17.200): Nicotine patch.  5. Thyroid nodule (E04.1): U/S noted.  FNA recommended for 2.1cm nodule.     Dispo: Can d/c later today or tomorrow depending on if Na continues to rise up with fluid restriction.  Pt is to keep appt with Dr. Gee for April 26 at [facility].    Thank you for allowing me to participate in the care of this patient.      Kaz Dupree MD  Nephrology

## 2017-04-15 NOTE — PLAN OF CARE
Problem: Patient Care Overview  Goal: Plan of Care Review  Outcome: Ongoing (interventions implemented as appropriate)  Pt free from falls, injury, and skin breakdown this shift. VS stable on RA, re-positions per self. Ambulates independently with home walker to restroom. All questions answered, plan of care reviewed with pt. Fluid restriction maintained, I/Os recorded. Bed locked and low, call light within reach, purposeful hourly rounding completed.

## 2017-04-16 LAB
ANION GAP SERPL CALC-SCNC: 6 MMOL/L
ANION GAP SERPL CALC-SCNC: 9 MMOL/L
BUN SERPL-MCNC: 20 MG/DL
BUN SERPL-MCNC: 27 MG/DL
CALCIUM SERPL-MCNC: 8.5 MG/DL
CALCIUM SERPL-MCNC: 8.7 MG/DL
CHLORIDE SERPL-SCNC: 97 MMOL/L
CHLORIDE SERPL-SCNC: 99 MMOL/L
CO2 SERPL-SCNC: 22 MMOL/L
CO2 SERPL-SCNC: 24 MMOL/L
CREAT SERPL-MCNC: 0.9 MG/DL
CREAT SERPL-MCNC: 0.9 MG/DL
EST. GFR  (AFRICAN AMERICAN): >60 ML/MIN/1.73 M^2
EST. GFR  (AFRICAN AMERICAN): >60 ML/MIN/1.73 M^2
EST. GFR  (NON AFRICAN AMERICAN): >60 ML/MIN/1.73 M^2
EST. GFR  (NON AFRICAN AMERICAN): >60 ML/MIN/1.73 M^2
GLUCOSE SERPL-MCNC: 114 MG/DL
GLUCOSE SERPL-MCNC: 95 MG/DL
MAGNESIUM SERPL-MCNC: 1.4 MG/DL
POTASSIUM SERPL-SCNC: 4.8 MMOL/L
POTASSIUM SERPL-SCNC: 4.8 MMOL/L
SODIUM SERPL-SCNC: 127 MMOL/L
SODIUM SERPL-SCNC: 130 MMOL/L

## 2017-04-16 PROCEDURE — 25000003 PHARM REV CODE 250: Performed by: PHYSICIAN ASSISTANT

## 2017-04-16 PROCEDURE — 25000003 PHARM REV CODE 250: Performed by: INTERNAL MEDICINE

## 2017-04-16 PROCEDURE — 83735 ASSAY OF MAGNESIUM: CPT

## 2017-04-16 PROCEDURE — 94640 AIRWAY INHALATION TREATMENT: CPT

## 2017-04-16 PROCEDURE — 11000001 HC ACUTE MED/SURG PRIVATE ROOM

## 2017-04-16 PROCEDURE — 36415 COLL VENOUS BLD VENIPUNCTURE: CPT

## 2017-04-16 PROCEDURE — 25000003 PHARM REV CODE 250: Performed by: NURSE PRACTITIONER

## 2017-04-16 PROCEDURE — 80048 BASIC METABOLIC PNL TOTAL CA: CPT | Mod: 91

## 2017-04-16 PROCEDURE — 25000242 PHARM REV CODE 250 ALT 637 W/ HCPCS: Performed by: INTERNAL MEDICINE

## 2017-04-16 RX ORDER — TOLVAPTAN 15 MG/1
15 TABLET ORAL ONCE
Status: COMPLETED | OUTPATIENT
Start: 2017-04-16 | End: 2017-04-16

## 2017-04-16 RX ADMIN — FAMOTIDINE 20 MG: 20 TABLET, FILM COATED ORAL at 09:04

## 2017-04-16 RX ADMIN — HYDROCODONE BITARTRATE AND ACETAMINOPHEN 1 TABLET: 10; 325 TABLET ORAL at 09:04

## 2017-04-16 RX ADMIN — TOLVAPTAN 15 MG: 15 TABLET ORAL at 03:04

## 2017-04-16 RX ADMIN — IPRATROPIUM BROMIDE AND ALBUTEROL SULFATE 3 ML: .5; 3 SOLUTION RESPIRATORY (INHALATION) at 08:04

## 2017-04-16 RX ADMIN — Medication 400 MG: at 09:04

## 2017-04-16 RX ADMIN — FAMOTIDINE 20 MG: 20 TABLET, FILM COATED ORAL at 08:04

## 2017-04-16 RX ADMIN — LISINOPRIL 40 MG: 20 TABLET ORAL at 08:04

## 2017-04-16 RX ADMIN — NICOTINE 1 PATCH: 21 PATCH, EXTENDED RELEASE TRANSDERMAL at 08:04

## 2017-04-16 RX ADMIN — ATORVASTATIN CALCIUM 40 MG: 20 TABLET, FILM COATED ORAL at 08:04

## 2017-04-16 RX ADMIN — AMLODIPINE BESYLATE 10 MG: 5 TABLET ORAL at 08:04

## 2017-04-16 RX ADMIN — Medication 400 MG: at 08:04

## 2017-04-16 RX ADMIN — HYDROCODONE BITARTRATE AND ACETAMINOPHEN 1 TABLET: 10; 325 TABLET ORAL at 08:04

## 2017-04-16 RX ADMIN — CYANOCOBALAMIN TAB 1000 MCG 1000 MCG: 1000 TAB at 08:04

## 2017-04-16 RX ADMIN — IPRATROPIUM BROMIDE AND ALBUTEROL SULFATE 3 ML: .5; 3 SOLUTION RESPIRATORY (INHALATION) at 07:04

## 2017-04-16 RX ADMIN — FLUTICASONE FUROATE AND VILANTEROL TRIFENATATE 1 PUFF: 100; 25 POWDER RESPIRATORY (INHALATION) at 08:04

## 2017-04-16 RX ADMIN — METOPROLOL SUCCINATE 50 MG: 50 TABLET, EXTENDED RELEASE ORAL at 08:04

## 2017-04-16 NOTE — PLAN OF CARE
Problem: Fall Risk (Adult)  Goal: Absence of Falls  Patient will demonstrate the desired outcomes by discharge/transition of care.   Outcome: Ongoing (interventions implemented as appropriate)  Patient is a fall risk on paper, but is quite secure in ambulation using her walker for assist.  No skin breakdown or trauma associated with a fall noted or observed.    Problem: Patient Care Overview  Goal: Plan of Care Review  Outcome: Ongoing (interventions implemented as appropriate)  Current plan of care reviewed with the patient during her assessment.  Sodium levels since admit reviewed and its increasing trend to a healthier level is noted.

## 2017-04-16 NOTE — PLAN OF CARE
Problem: Patient Care Overview  Goal: Plan of Care Review  Outcome: Ongoing (interventions implemented as appropriate)  Patient without complaints at this time. Patient free from falls. Bed in lowest position, call light within reach. Will continue to monitor.

## 2017-04-16 NOTE — PLAN OF CARE
Problem: Patient Care Overview  Goal: Plan of Care Review  Outcome: Ongoing (interventions implemented as appropriate)  Good saturation on room air, no PRN rx requested.

## 2017-04-16 NOTE — PROGRESS NOTES
Renal Progress Note    Admit Date: 4/13/2017   LOS: 1 day     SUBJECTIVE:   Follow-up For: Hyponatremia     Interval History:  Reports adhering to fluid restriction Recorded UOP 700ml.  Serum Na down to 127 despite.  She says she tends to drink a lot of water at night because she has dry mouth during sleep.  Her living situation is such that they will not allow other fluids than water in the facility, even Gatorade.  Apparently this issue of hyponatremia ongoing since December at which time she was on HCTZ and Zoloft, both of which have been held since.  She was only started on the Wellbutrin a week prior to admission.    Scheduled Meds:   amlodipine  10 mg Oral Daily    atorvastatin  40 mg Oral Daily    cyanocobalamin  1,000 mcg Oral Daily    famotidine  20 mg Oral BID    fluticasone-vilanterol  1 puff Inhalation Daily    lisinopril  40 mg Oral Daily    magnesium oxide  400 mg Oral BID    metoprolol succinate  50 mg Oral Daily    nicotine  1 patch Transdermal Daily       OBJECTIVE:     Vital Signs Range (Last 24H):  Temp:  [97.9 °F (36.6 °C)-98.3 °F (36.8 °C)]   Pulse:  [68-75]   Resp:  [16-18]   BP: (135-153)/(65-74)   SpO2:  [95 %-96 %]     I & O (Last 24H):  Intake/Output Summary (Last 24 hours) at 04/16/17 1455  Last data filed at 04/16/17 0435   Gross per 24 hour   Intake              430 ml   Output              700 ml   Net             -270 ml       Physical Exam:  Gen: AAOx3, NAD  HEENT: mmm, sclera anicteric  CV: RRR, no m/r  Resp: scattered rhonchi at bases  GI: soft, obese, NTTP, +BS  Skin: warm, dry  Extr: no edema      Laboratory:  CBC:   Recent Labs  Lab 04/13/17  1356   WBC 10.20   RBC 3.67*   HGB 10.8*   HCT 31.6*      MCV 86   MCH 29.4   MCHC 34.2     BMP:   Recent Labs  Lab 04/16/17  0437   GLU 95   *   K 4.8   CL 99   CO2 22*   BUN 20   CREATININE 0.9   CALCIUM 8.5*   MG 1.4*       ASSESSMENT/PLAN:     1. Acute on chronic Hyponatremia with suspected underlying SIADH (E87.1,  E22.2): CT chest noted. It's possible Wellbutrin also playied a role. Continue with fluid restriction 1L/day. Will give her 15 mg of Tolvaptan today.  She said she has good insurance and has not had an issue with medication non-coverage.  2. Hypomagnesemia likely from chronic PPI (E83.42): Replace IV/PO. Avoid PPI. Use H2 blocker if needed. Replace.  3. HTN (I10): Added Toprol XL 50mg/d.  4. Tobacco dependency (F17.200): Nicotine patch.  5. Thyroid nodule (E04.1): U/S noted.  FNA recommended for 2.1cm nodule.   6. Disp:  Would keep here inpatient until it's clear how she responds to Tolvaptan. Will give her a dose of 15 mg now and see how she responds overnight. She reports that she lives in a group setting ([facility]) where she is not allowed to drink fluids other than water as a rule (??), which is why she drinks so much free water. May be that she needs Tolvpatan regularly, dosing interval to be determined.       Pt is to keep appt with Dr. Gee for April 26 at [facility].

## 2017-04-16 NOTE — PLAN OF CARE
Problem: Patient Care Overview  Goal: Plan of Care Review  Outcome: Ongoing (interventions implemented as appropriate)  Patient on room air; prn aerosol treatment given at 0800 due to wheezing. Daily MDI given.

## 2017-04-17 VITALS
DIASTOLIC BLOOD PRESSURE: 75 MMHG | HEIGHT: 66 IN | RESPIRATION RATE: 18 BRPM | BODY MASS INDEX: 32.14 KG/M2 | TEMPERATURE: 97 F | OXYGEN SATURATION: 97 % | HEART RATE: 67 BPM | SYSTOLIC BLOOD PRESSURE: 163 MMHG | WEIGHT: 200 LBS

## 2017-04-17 LAB
ANION GAP SERPL CALC-SCNC: 7 MMOL/L
BUN SERPL-MCNC: 26 MG/DL
CALCIUM SERPL-MCNC: 8.7 MG/DL
CHLORIDE SERPL-SCNC: 103 MMOL/L
CO2 SERPL-SCNC: 20 MMOL/L
CREAT SERPL-MCNC: 0.8 MG/DL
EST. GFR  (AFRICAN AMERICAN): >60 ML/MIN/1.73 M^2
EST. GFR  (NON AFRICAN AMERICAN): >60 ML/MIN/1.73 M^2
GLUCOSE SERPL-MCNC: 106 MG/DL
MAGNESIUM SERPL-MCNC: 1.3 MG/DL
POTASSIUM SERPL-SCNC: 4.6 MMOL/L
SODIUM SERPL-SCNC: 130 MMOL/L

## 2017-04-17 PROCEDURE — 25000003 PHARM REV CODE 250: Performed by: NURSE PRACTITIONER

## 2017-04-17 PROCEDURE — 25000003 PHARM REV CODE 250: Performed by: INTERNAL MEDICINE

## 2017-04-17 PROCEDURE — 83735 ASSAY OF MAGNESIUM: CPT

## 2017-04-17 PROCEDURE — 99238 HOSP IP/OBS DSCHRG MGMT 30/<: CPT | Mod: ,,, | Performed by: INTERNAL MEDICINE

## 2017-04-17 PROCEDURE — 36415 COLL VENOUS BLD VENIPUNCTURE: CPT

## 2017-04-17 PROCEDURE — 80048 BASIC METABOLIC PNL TOTAL CA: CPT

## 2017-04-17 PROCEDURE — 63600175 PHARM REV CODE 636 W HCPCS: Performed by: NURSE PRACTITIONER

## 2017-04-17 PROCEDURE — 25000003 PHARM REV CODE 250: Performed by: PHYSICIAN ASSISTANT

## 2017-04-17 PROCEDURE — 94640 AIRWAY INHALATION TREATMENT: CPT

## 2017-04-17 RX ORDER — FAMOTIDINE 20 MG/1
20 TABLET, FILM COATED ORAL 2 TIMES DAILY
Qty: 60 TABLET | Refills: 5 | Status: SHIPPED | OUTPATIENT
Start: 2017-04-17 | End: 2017-09-07 | Stop reason: SDUPTHER

## 2017-04-17 RX ADMIN — ATORVASTATIN CALCIUM 40 MG: 20 TABLET, FILM COATED ORAL at 09:04

## 2017-04-17 RX ADMIN — Medication 400 MG: at 09:04

## 2017-04-17 RX ADMIN — AMLODIPINE BESYLATE 10 MG: 5 TABLET ORAL at 09:04

## 2017-04-17 RX ADMIN — FLUTICASONE FUROATE AND VILANTEROL TRIFENATATE 1 PUFF: 100; 25 POWDER RESPIRATORY (INHALATION) at 08:04

## 2017-04-17 RX ADMIN — CYANOCOBALAMIN TAB 1000 MCG 1000 MCG: 1000 TAB at 09:04

## 2017-04-17 RX ADMIN — METOPROLOL SUCCINATE 50 MG: 50 TABLET, EXTENDED RELEASE ORAL at 09:04

## 2017-04-17 RX ADMIN — HYDROCODONE BITARTRATE AND ACETAMINOPHEN 1 TABLET: 10; 325 TABLET ORAL at 09:04

## 2017-04-17 RX ADMIN — LISINOPRIL 40 MG: 20 TABLET ORAL at 09:04

## 2017-04-17 RX ADMIN — MAGNESIUM SULFATE HEPTAHYDRATE: 500 INJECTION, SOLUTION INTRAMUSCULAR; INTRAVENOUS at 10:04

## 2017-04-17 RX ADMIN — NICOTINE 1 PATCH: 21 PATCH, EXTENDED RELEASE TRANSDERMAL at 09:04

## 2017-04-17 RX ADMIN — FAMOTIDINE 20 MG: 20 TABLET, FILM COATED ORAL at 09:04

## 2017-04-17 NOTE — PLAN OF CARE
Problem: Patient Care Overview  Goal: Plan of Care Review  Outcome: Ongoing (interventions implemented as appropriate)  PRN rx given by request

## 2017-04-17 NOTE — PLAN OF CARE
Problem: Patient Care Overview  Goal: Plan of Care Review  Outcome: Ongoing (interventions implemented as appropriate)  Continue o2 and mdi therapy

## 2017-04-17 NOTE — PLAN OF CARE
4/17/2017    Patient: Nalini Varma    YOB: 1950    To whom it may concern,    Nalini Varma was admitted to the hospital under my care on 4/13/2017 and was discharged on 4/17/2017.    She has a medical condition which is worsened by consumption of excessive  water. She should be allowed to consume other beverages such as gatorade. Additionally if possible she should be allowed to spend additional time inside as excessive sweating may  worsen her condition.      If you have any questions or concerns, please don't hesitate to contact the department of hospital medicine at [phone]    Sincerely,    Arnav Pandya MD    Department of Hospital Medicine

## 2017-04-17 NOTE — DISCHARGE SUMMARY
Admit Date: 4/13/2017    Discharge Date and Time:  04/17/2017      Discharge Attending Physician: Arnav Pandya MD     Diagnoses:  Active Hospital Problems    Diagnosis  POA    *Hyponatremia [E87.1]  Yes    Thyroid nodule [E04.1]  Yes    Pulmonary nodule [R91.1]  Yes    Hypomagnesemia [E83.42]  Yes    Chronic obstructive pulmonary disease [J44.9]  Yes    Tobacco dependence [F17.200]  Yes    Hypertension, benign [I10]  Yes      Resolved Hospital Problems    Diagnosis Date Resolved POA   No resolved problems to display.       Discharged Condition: good      Hospital Course:   Patient is a 66 y.o. female presents with acute on chronic hyponatremia from outpt clinic. Pt has had -130  In Jan and her HCTZ and zoloft were DC'd. Pt has recently started on wellbutrin and had 1 day of N/V/D. Outpt labs revealed Na of 117 and was directed to come to ED.       Pt admits to drinking about 2.5 Liters of water  a day - she lives at the [facility] and they do not allow any thing but water and during the day she is on the street and sweats a lot and drinks water to avoid dehydration.    Studies were c/w SIADH.  With fluid restriction Na increased. She also received a dose of Tolivaptan.    She was also noted to have a thydoid nodule on the recent chest CT she had done. US confirms this and she needs outpatient FNA.    Renal also recommended d/c of PPI and switch to H2 blocker due to hypomagnesemia.    Letter was sent to the shelter where she lives to see if they will make an exception to the only water rule and allow her to stay indoors out of the heat.    Plan d/c on 1L fluid restriction.  Discontinue wellbutrin as may be contributing to the hyponatremia.     Has f/u appt with nephrology  4/26/17    Na level on discharge 130    Consults: Nephrology    Significant Diagnostic Studies:    urine osm 481---> 261    Thyroid US: solid nodule in the inferior left thyroid lobe measures 2.1 x 0.9 x 1.4 cm,      Disposition: Home or Self Care    Patient Instructions:     Current Discharge Medication List      START taking these medications    Details   famotidine (PEPCID) 20 MG tablet Take 1 tablet (20 mg total) by mouth 2 (two) times daily.  Qty: 60 tablet, Refills: 5    Associated Diagnoses: Gastroesophageal reflux disease without esophagitis         CONTINUE these medications which have NOT CHANGED    Details   albuterol 90 mcg/actuation inhaler Inhale 2 puffs into the lungs every 6 (six) hours as needed for Wheezing.  Qty: 6.7 g, Refills: 11      alendronate (FOSAMAX) 70 MG tablet Take 1 tablet (70 mg total) by mouth every 7 days.  Qty: 12 tablet, Refills: 3      amlodipine (NORVASC) 5 MG tablet Take 1 tablet (5 mg total) by mouth once daily.  Qty: 30 tablet, Refills: 3      atorvastatin (LIPITOR) 40 MG tablet Take 1 tablet (40 mg total) by mouth once daily.  Qty: 90 tablet, Refills: 3      calcium carbonate (CALTRATE 600) 600 mg (1,500 mg) Tab Take 1 tablet (600 mg total) by mouth 2 (two) times daily with meals.  Refills: 0    Associated Diagnoses: Osteoporosis      cyanocobalamin 1,000 mcg TbSR Take 1,000 mcg by mouth once daily.  Qty: 90 tablet, Refills: 1    Associated Diagnoses: B12 deficiency      fluticasone-salmeterol (ADVAIR HFA) 115-21 mcg/actuation HFAA Inhale 2 puffs into the lungs every 12 (twelve) hours.  Qty: 36 g, Refills: 3      hydrocodone-acetaminophen 10-325mg (NORCO)  mg Tab Take 1 tablet by mouth every 12 (twelve) hours as needed for Pain.  Qty: 60 tablet, Refills: 0      lisinopril (PRINIVIL,ZESTRIL) 40 MG tablet Take 1 tablet (40 mg total) by mouth once daily.  Qty: 90 tablet, Refills: 1      magnesium oxide 400 mg Cap Take 1 capsule by mouth once daily.      umeclidinium 62.5 mcg/actuation DsDv Inhale 1 puff into the lungs once daily. Controller  Qty: 30 each, Refills: 11      walker (ULTRA-LIGHT ROLLATOR) Misc 1 each by Misc.(Non-Drug; Combo Route) route once daily.  Qty: 1 each,  Refills: 0         STOP taking these medications       buPROPion (WELLBUTRIN XL) 300 MG 24 hr tablet Comments:   Reason for Stopping:         pantoprazole (PROTONIX) 20 MG tablet Comments:   Reason for Stopping:                 Discharge Procedure Orders  Diet general   Order Specific Question Answer Comments   Fluid restriction: Fluid - 1000mL      Activity as tolerated

## 2017-04-17 NOTE — PHYSICIAN QUERY
"PT Name: Nalini Varma  MR #: [id number]     Physician Query Form - Documentation Clarification      CDS/: Cici Arrieta RN, CCDS         Contact information :[phone]     This form is a permanent document in the medical record.     Query Date: April 17, 2017    By submitting this query, we are merely seeking further clarification of documentation. Please utilize your independent clinical judgment when addressing the question(s) below.    The Medical record reflects the following:    Supporting Clinical Findings Location in Medical Record     "Has chronic SOB due to tobacco usage/asthma"  "Scattered rhonchi/faint exp wheeze"      "Patient on room air; prn aerosol treatment given at 0800 due to wheezing. Daily MDI given"    fluticasone-salmeterol (ADVAIR HFA) 115-21 mcg/actuation HFAA  albuterol 90 mcg/actuation inhaler    "COPD  -quiscent  -nebs prn  -breo"     Nephrology consult 4/14/17          RRT Note 4/16/17        Home meds 4/14/17        Progress note 4/14/17                                                                                Doctor, Please specify diagnosis or diagnoses associated with above clinical findings.  Please clarify diagnosis of asthma:    Provider Use Only    [ X ]  Asthma (Specify type from each column)   [  ] Mild                     [  ] Intermittent           [ X ] Uncomplicated   [ X ] Moderate            [ X ] Persistent             [  ] With exacerbation   [  ] Severe                [  ] Unspecified          [  ] With status asthmaticus   [  ] Unspecified                                          [  ] Unspecified      [  ] Asthma ruled out    [  ] Other condition, please specify______                                                                                                               [  ] Clinically undetermined            "

## 2017-04-17 NOTE — PROGRESS NOTES
Renal Progress Note    Admit Date: 4/13/2017   LOS: 2 days     SUBJECTIVE:   Follow-up For: Hyponatremia     Interval History:  Uneventful night.  Denies CP/SOB.  Discussed with treatment team. Na improved to 130.     Scheduled Meds:   amlodipine  10 mg Oral Daily    atorvastatin  40 mg Oral Daily    cyanocobalamin  1,000 mcg Oral Daily    famotidine  20 mg Oral BID    fluticasone-vilanterol  1 puff Inhalation Daily    lisinopril  40 mg Oral Daily    magnesium oxide  400 mg Oral BID    custom IVPB builder   Intravenous Once    metoprolol succinate  50 mg Oral Daily    nicotine  1 patch Transdermal Daily       OBJECTIVE:     Vital Signs Range (Last 24H):  Temp:  [97.1 °F (36.2 °C)-98.2 °F (36.8 °C)]   Pulse:  [61-74]   Resp:  [16-18]   BP: (134-154)/(63-71)   SpO2:  [93 %-98 %]     I & O (Last 24H):    Intake/Output Summary (Last 24 hours) at 04/17/17 0903  Last data filed at 04/17/17 0700   Gross per 24 hour   Intake              570 ml   Output             2350 ml   Net            -1780 ml       Physical Exam:  Gen: AAOx3, NAD  HEENT: mmm, sclera anicteric  CV: RRR, no m/r  Resp: CTA  GI: soft, obese, NTTP, +BS  Skin: warm, dry  Extr: no edema      Laboratory:  CBC:     Recent Labs  Lab 04/13/17  1356   WBC 10.20   RBC 3.67*   HGB 10.8*   HCT 31.6*      MCV 86   MCH 29.4   MCHC 34.2     BMP:     Recent Labs  Lab 04/17/17  0522      *   K 4.6      CO2 20*   BUN 26*   CREATININE 0.8   CALCIUM 8.7   MG 1.3*       ASSESSMENT/PLAN:     1. Acute on chronic Hyponatremia with suspected underlying SIADH vs reset osmostat (E87.1, E22.2): CT chest noted. It's possible Wellbutrin also playied a role. Continue with fluid restriction 1L/day. Tolvaptan given yesterday.  See no need for further dosing at this time.  Keep appt with Dr. Gee later this month.   2. Hypomagnesemia likely from chronic PPI vs ?? (E83.42): Replace IV/PO. Avoid PPI. Use H2 blocker if needed. Replace again today.  Defer further w/up by Dr. Gee. Bartter/Gitelman syndrome?  3. HTN (I10): as now.  4. Tobacco dependency (F17.200): Nicotine patch.  5. Thyroid nodule (E04.1): U/S noted.  FNA recommended for 2.1cm nodule. Defer.   6. Disp:  She lives in a group setting ([facility]) where she is not allowed to drink fluids other than water as a rule (??), which is why she drinks so much free water. She walks the streets from 7am-4pm and usually drinks water to stay hydrated. Consider SW consult to help with housing assistance.  Hard to believe that a PHN pt is homeless.....   Defer.        Pt is to keep appt with Dr. Gee for April 26 at [facility].

## 2017-04-17 NOTE — PLAN OF CARE
Problem: Patient Care Overview  Goal: Plan of Care Review  Outcome: Ongoing (interventions implemented as appropriate)  Plan of care reviewed with patient. Pain well controlled with PRN meds. Positions self independently. Remained free of falls and injury throughout shift. Bed lowered and locked, call light and personal items within reach. No needs at this time. Will continue to monitor.

## 2017-04-17 NOTE — PROGRESS NOTES
Discharge instructions given to patient, all questions answered.  New medications and follow up appointments reviewed with patient.  Patient verbalized understanding.

## 2017-04-17 NOTE — PLAN OF CARE
CM met with pt at bedside for final discharge assessment. Pt states she lives at [facility] and will return there. Pt states she has no CM needs for discharge.     04/17/17 0936   Final Note   Assessment Type Final Discharge Note   Discharge Disposition Home-Health  (Pt lives at [facility] and will return there)   Discharge planning education complete? Yes   What phone number can be called within the next 1-3 days to see how you are doing after discharge? [phone]   Hospital Follow Up  Appt(s) scheduled? Yes   Discharge plans and expectations educations in teach back method with documentation complete? Yes   Offered [facility] -- Bedside Delivery? n/a   Discharge/Hospital Encounter Summary to (non-[facility]) PCP Yes   Referral to Outpatient Case Management complete? n/a   Referral to / orders for Home Health Complete? n/a   30 day supply of medicines given at discharge, if documented non-compliance / non-adherence? n/a   Any social issues identified prior to discharge? No   Did you assess the readiness or willingness of the family or caregiver to support self management of care? Yes

## 2017-04-17 NOTE — PROGRESS NOTES
Progress Note  Hospital Medicine    Admit Date: 4/13/2017   LOS: 2 days     SUBJECTIVE:     Follow-up For:  Hyponatremia    Interval History:       Says she feels better    Got dose of tolviptan.    Review of Systems:  Constitutional: No fever or chills  Respiratory: No cough or shorness of breath  Cardiovascular: No chest pain or palpitations  Gastrointestinal: No nausea or vomiting  Neurological: No confusion or weakness    OBJECTIVE:     Vital Signs Range (Last 24H):  Vitals:    04/17/17 0823   BP:    Pulse: 61   Resp: 18   Temp:        Temp:  [97.1 °F (36.2 °C)-98.2 °F (36.8 °C)]   Pulse:  [61-74]   Resp:  [16-18]   BP: (134-154)/(63-71)   SpO2:  [93 %-98 %]     I & O (Last 24H):    Intake/Output Summary (Last 24 hours) at 04/17/17 0923  Last data filed at 04/17/17 0700   Gross per 24 hour   Intake              570 ml   Output             2350 ml   Net            -1780 ml       Physical Exam:  General appearance: Well developed, well nourished  Eyes:  Conjunctivae/corneas clear. PERRL.  Lungs: Normal respiratory effort,   clear to auscultation bilaterally   Heart: Regular rate and rhythm, S1, S2 normal, no murmur, rub or dionte.  Abdomen: Soft, non-tender non-distended; bowel sounds normal; no masses,  no organomegaly  Extremities: No cyanosis or clubbing. No edema.    Skin: Skin color, texture, turgor normal. No rashes or lesions  Neurologic: Normal strength and tone. No focal numbness or weakness     Laboratory Data:  Reviewed and noted  where applicable- Please see chart for full lab data.    Medications:  Medication list was reviewed and changes noted under Assessment/Plan.    Diagnostic Results:  AM cortisol 12.5      ASSESSMENT/PLAN:     Active Hospital Problems    Diagnosis  POA    *Hyponatremia [E87.1]  Yes    Thyroid nodule [E04.1]  Yes    Pulmonary nodule [R91.1]  Yes    Acute hyponatremia [E87.1]  Yes    Hypomagnesemia [E83.42]  Yes    Chronic obstructive pulmonary disease [J44.9]  Yes     Tobacco dependence [F17.200]  Yes    Hypertension, benign [I10]  Yes      Resolved Hospital Problems    Diagnosis Date Resolved POA   No resolved problems to display.   1.       Acute on chronic Hyponatremia with suspected underlying SIADH :     -Continue with fluid restriction   -Suspect multifactorial -meds and excessive water intake  -Na at acceptable level and has been stable for past few days    Hypomag from PPI use  - replace  -pt switched to H2 blocker    HTN  -cont meds    Abnormal Chest CT in a smoker  Four sub-solid nodular groundglass opacities within the upper lobes bilaterally in this smoker are of uncertain significance but need repeat imaging to exclude developing malignancy    COPD  -quiscent  -nebs prn  -breo    Thyroid nodule  -2 cm Solid nodule in the left thyroid lobe   -needs outpatient FNA    Vte: ambulate  teds/scds      F/u:  appt 4/26 with Dr Marlen Pandya MD  c- [phone]  p- [phone]

## 2017-04-18 ENCOUNTER — TELEPHONE (OUTPATIENT)
Dept: INTERNAL MEDICINE | Facility: CLINIC | Age: 67
End: 2017-04-18

## 2017-04-18 DIAGNOSIS — E04.1 THYROID NODULE: Primary | ICD-10-CM

## 2017-04-18 NOTE — TELEPHONE ENCOUNTER
Please notify pt that the med change from protonix to pepcid was due to her low magnesium. protonix and meds in that category can reduce the absorption of magnesium and lead to low mag levels. Will see if mag improves with switching to another category of antacids like pepcid/zantac.     rec she get a bx of thyroid nodule to rule out cancer due to its size

## 2017-04-18 NOTE — TELEPHONE ENCOUNTER
----- Message from Lilia Walls sent at 4/18/2017 11:13 AM CDT -----  Contact: Self  X  1st Request  _  2nd Request  _  3rd Request        Who: ELIEZER RODRIGUEZ [[id number]]    Why: Pt is calling to discuss her being informed that she's needing a biopsy of her thyroids due to a nodule on thyroid.  Pt is also wanting to find out the reason for the change from her taking the Protonix to being given Pepcid 20 MG tablet.  Please contact pt to further discuss and advise.    What Number to Call Back: [phone]    When to Expect a call back: (Before the end of the day)   -- if the call is after 12:00, the call back will be tomorrow.

## 2017-04-18 NOTE — TELEPHONE ENCOUNTER
Pt states that the Doctors in the hospital changed her rx from protonix to Pepcid. Pt would like to know why she has to have a biopsy on a nodule found on her tyroid. Please Advise TARAN 3/28/2017

## 2017-04-19 ENCOUNTER — HOSPITAL ENCOUNTER (OUTPATIENT)
Dept: RADIOLOGY | Facility: OTHER | Age: 67
Discharge: HOME | End: 2017-04-19
Attending: INTERNAL MEDICINE
Payer: MEDICARE

## 2017-04-19 ENCOUNTER — PATIENT OUTREACH (OUTPATIENT)
Dept: ADMINISTRATIVE | Facility: CLINIC | Age: 67
End: 2017-04-19
Payer: MEDICARE

## 2017-04-19 DIAGNOSIS — E04.1 THYROID NODULE: ICD-10-CM

## 2017-04-19 DIAGNOSIS — R93.429 ABNORMAL CT SCAN, KIDNEY: ICD-10-CM

## 2017-04-19 PROCEDURE — 76770 US EXAM ABDO BACK WALL COMP: CPT | Mod: 26,,, | Performed by: RADIOLOGY

## 2017-04-19 PROCEDURE — 76770 US EXAM ABDO BACK WALL COMP: CPT | Mod: TC

## 2017-04-19 NOTE — PATIENT INSTRUCTIONS
Discharge Instructions for Hyponatremia  You were diagnosed with hyponatremia, which means your blood level of sodium (salt) is too low. Salt is needed for the body and brain to work. Very low blood levels of sodium can be fatal. Symptoms can include headache, confusion, fatigue, muscle cramps, hallucinations, seizures, and coma. You have been treated to raise your blood levels of sodium. The following instructions will help you care for yourself at home as you have been instructed.  Home care  · Limit your intake of fluids. Drink only the amounts directed by your healthcare provider.  · Ask your healthcare provider what you should use to replace fluids if you are throwing up.  · Keep all follow-up appointments. Your provider needs to watch your condition closely.  To help prevent hyponatremia:  · Take all medicines exactly as directed. Certain medicines can lower blood sodium levels.  · If you have done something that makes you sweat a lot, drink fluids that contain salt and other electrolytes.   · Tell all healthcare providers what medicines you take. Mention all prescription and over-the-counter drugs and herbs.  · Have your sodium levels checked often. This is vital if you take a diuretic (medicine that helps your body get rid of water).  Follow-up  Schedule a follow-up visit as directed.  When to call your healthcare provider  Call your provider right away if you have any of the following:  · Severe tiredness  · Fainting  · Dizziness  · Loss of appetite  · Nausea or vomiting  · Confusion or forgetfullness  · Muscle spasms, cramping, or twitching  · Seizures  · Gait disturbances   Date Last Reviewed: 6/8/2015  © 7650-7849 [employer]. [address] [place] [address], [place] [address]. All rights reserved. This information is not intended as a substitute for professional medical care. Always follow your healthcare professional's instructions.

## 2017-04-19 NOTE — PROGRESS NOTES
C3 nurse attempted to contact patient. No answer. The following message was left for the patient to return the call:  Good Evening I am a nurse calling on behalf of [facility] from the Care Coordination Center.  This is a Transitional Care Call for Nalini Varma . When you have a moment please contact us at [phone] or [phone] Monday through Friday, between the hours of 8 am to 4 pm. We look forward to speaking with you. On behalf of [facility] have a nice day.    The patient does not have a scheduled HOSFU appointment within 7-14 days post hospital discharge date 4/17/17. Message sent to Physician staff to assist with HOSFU appointment scheduling.

## 2017-04-19 NOTE — TELEPHONE ENCOUNTER
Pt has been informed of pcp's rec. Pt states that she has not scheduled an appointment to complete a biopsy of her thyroid; Pt is requesting to complete the biopsy.Spoke with pcp. Advised to contact IR in regards to scheduling Thyroid biopsy.

## 2017-04-20 NOTE — TELEPHONE ENCOUNTER
Left a message on pt's voicemail to return call to office in regards to scheduling thyroid biopsy.

## 2017-04-20 NOTE — TELEPHONE ENCOUNTER
Advised by US department to inform pcp that order code for thyroid nodule biopsy is [id number]. Once the order is placed the procedure can be scheduled.

## 2017-04-20 NOTE — TELEPHONE ENCOUNTER
Awaiting a response from US department in regards to information about ordering a Thyroid nodule biopsy.

## 2017-04-26 ENCOUNTER — PATIENT MESSAGE (OUTPATIENT)
Dept: NEPHROLOGY | Facility: CLINIC | Age: 67
End: 2017-04-26

## 2017-04-26 ENCOUNTER — OFFICE VISIT (OUTPATIENT)
Dept: NEPHROLOGY | Facility: CLINIC | Age: 67
End: 2017-04-26
Payer: MEDICARE

## 2017-04-26 ENCOUNTER — LAB VISIT (OUTPATIENT)
Dept: LAB | Facility: HOSPITAL | Age: 67
End: 2017-04-26
Attending: INTERNAL MEDICINE
Payer: MEDICARE

## 2017-04-26 VITALS
HEART RATE: 94 BPM | BODY MASS INDEX: 34.12 KG/M2 | WEIGHT: 212.31 LBS | HEIGHT: 66 IN | OXYGEN SATURATION: 99 % | DIASTOLIC BLOOD PRESSURE: 80 MMHG | SYSTOLIC BLOOD PRESSURE: 150 MMHG

## 2017-04-26 DIAGNOSIS — J44.9 CHRONIC OBSTRUCTIVE PULMONARY DISEASE, UNSPECIFIED COPD TYPE: ICD-10-CM

## 2017-04-26 DIAGNOSIS — E87.1 HYPONATREMIA: ICD-10-CM

## 2017-04-26 DIAGNOSIS — E87.1 HYPONATREMIA: Primary | ICD-10-CM

## 2017-04-26 DIAGNOSIS — E22.2 SIADH (SYNDROME OF INAPPROPRIATE ADH PRODUCTION): ICD-10-CM

## 2017-04-26 DIAGNOSIS — D64.9 ANEMIA, UNSPECIFIED TYPE: ICD-10-CM

## 2017-04-26 DIAGNOSIS — E83.42 HYPOMAGNESEMIA: ICD-10-CM

## 2017-04-26 LAB
CHLORIDE UR-SCNC: 50 MMOL/L
OSMOLALITY UR: 379 MOSM/KG
POTASSIUM UR-SCNC: 39 MMOL/L
SODIUM UR-SCNC: 56 MMOL/L

## 2017-04-26 PROCEDURE — 84133 ASSAY OF URINE POTASSIUM: CPT

## 2017-04-26 PROCEDURE — 99204 OFFICE O/P NEW MOD 45 MIN: CPT | Mod: S$GLB,,, | Performed by: INTERNAL MEDICINE

## 2017-04-26 PROCEDURE — 99499 UNLISTED E&M SERVICE: CPT | Mod: S$PBB,,, | Performed by: INTERNAL MEDICINE

## 2017-04-26 PROCEDURE — 84300 ASSAY OF URINE SODIUM: CPT

## 2017-04-26 PROCEDURE — 1125F AMNT PAIN NOTED PAIN PRSNT: CPT | Mod: S$GLB,,, | Performed by: INTERNAL MEDICINE

## 2017-04-26 PROCEDURE — 3077F SYST BP >= 140 MM HG: CPT | Mod: S$GLB,,, | Performed by: INTERNAL MEDICINE

## 2017-04-26 PROCEDURE — 83935 ASSAY OF URINE OSMOLALITY: CPT

## 2017-04-26 PROCEDURE — 1160F RVW MEDS BY RX/DR IN RCRD: CPT | Mod: S$GLB,,, | Performed by: INTERNAL MEDICINE

## 2017-04-26 PROCEDURE — 99999 PR PBB SHADOW E&M-EST. PATIENT-LVL IV: CPT | Mod: PBBFAC,,, | Performed by: INTERNAL MEDICINE

## 2017-04-26 PROCEDURE — 82436 ASSAY OF URINE CHLORIDE: CPT

## 2017-04-26 PROCEDURE — 1159F MED LIST DOCD IN RCRD: CPT | Mod: S$GLB,,, | Performed by: INTERNAL MEDICINE

## 2017-04-26 PROCEDURE — 3079F DIAST BP 80-89 MM HG: CPT | Mod: S$GLB,,, | Performed by: INTERNAL MEDICINE

## 2017-04-26 NOTE — LETTER
April 26, 2017      Yane Sahni MD  [address]  [facility] [address]           [facility] - Nephrology  [address]  [facility] [address]  Phone: [phone]  Fax: [phone]          Patient: Nalini Varma   MR Number: [id number]   YOB: 1950   Date of Visit: 4/26/2017       Dear Dr. Yane Sahni:    Thank you for referring Nalini Varma to me for evaluation. Attached you will find relevant portions of my assessment and plan of care.    If you have questions, please do not hesitate to call me. I look forward to following Nalini Varma along with you.    Sincerely,    Roxanne Gee MD    Enclosure  CC:  No Recipients    If you would like to receive this communication electronically, please contact [email or web address] or [phone] to request more information on [employer] access.    For providers and/or their staff who would like to refer a patient to [facility], please contact us through our one-stop-shop provider referral line, [facility], at [phone].    If you feel you have received this communication in error or would no longer like to receive these types of communications, please e-mail [email or web address]

## 2017-04-26 NOTE — PROGRESS NOTES
Subjective:       Patient ID: Nalini Varma is a 66 y.o. White female who presents for new evaluation of hyponatremia  The patient has a history HTN x 2-3 yrs. She was recently admitted to [facility] with dizziness and found to have acute on chronic to hyponatremia. Her hyponatremia is evident since 12/16  But also had borderline low sodium since 2/16 (135 meq/l). In December of 2016 she was on HCTZ and Zoloft but she is not taking it anymore. Reports adhering to fluid restriction around 1 liter at this time. Last serum Na down was 130 at discharge from [facility]. Unfortunately the patient is homeless and lives in a shelter at this time (20 month).     The patient does not freuqently use NSAIDS or herbal supplements. Livelong smoker (40 yrs).     HPI  Review of Systems   Constitutional: Negative for activity change, appetite change, chills, fatigue, fever and unexpected weight change.   HENT: Negative.  Negative for nosebleeds.    Eyes: Negative.    Respiratory: Positive for shortness of breath (stable with walking). Negative for cough and chest tightness.    Cardiovascular: Negative.  Negative for chest pain and leg swelling.   Gastrointestinal: Negative for abdominal pain, anal bleeding, diarrhea, nausea and vomiting.   Genitourinary: Negative for decreased urine volume, difficulty urinating, dysuria, flank pain, frequency, hematuria, pelvic pain, urgency and vaginal bleeding.   Musculoskeletal: Negative.  Negative for joint swelling and myalgias.   Skin: Negative.  Negative for rash.   Neurological: Negative.    Psychiatric/Behavioral: Negative.    All other systems reviewed and are negative.      Objective:      Physical Exam   Constitutional: She is oriented to person, place, and time. She appears well-developed and well-nourished. No distress.   HENT:   Head: Normocephalic and atraumatic.   Right Ear: External ear normal.   Left Ear: External ear normal.   Nose: Nose normal.   Mouth/Throat: Oropharynx is clear  and moist.   Eyes: Conjunctivae and EOM are normal. Pupils are equal, round, and reactive to light.   Neck: Normal range of motion. Neck supple. No thyromegaly present.   Cardiovascular: Normal rate, regular rhythm and intact distal pulses.    Murmur (systolic) heard.  Pulmonary/Chest: Effort normal and breath sounds normal. No respiratory distress. She has no wheezes. She has no rales. She exhibits no tenderness.   Abdominal: Soft. Normal appearance and bowel sounds are normal. She exhibits no distension. There is no tenderness. There is no rebound and no guarding.   Musculoskeletal: Normal range of motion. She exhibits edema (mild bilateral). She exhibits no tenderness.   Neurological: She is alert and oriented to person, place, and time. She has normal reflexes.   Skin: Skin is warm, dry and intact. She is not diaphoretic.   Nails of large toes missing   Psychiatric: She has a normal mood and affect. Her behavior is normal. Judgment and thought content normal.   Nursing note and vitals reviewed.      Assessment:       1. Hyponatremia    2. Hypomagnesemia    3. Chronic obstructive pulmonary disease, unspecified COPD type    4. Anemia, unspecified type        Plan:       1. Hyponatremia since 2015 chronic Hyponatremia on and off since 2/16.   Her labs in the past with the elevated urinary sodium and osmolality in the light of hypontremia point towards SIADH. Wellbutrin and HCTZ could have played a role in the past.  Continue with fluid restriction 1L/day.    - labs today   - close follow up  - evaluate for possibly tumor- send to pulmonary for follow up  - patient advised to continue with the fluid restriction (1 liter)    Lab Results   Component Value Date    CREATININE 0.8 04/17/2017     Protein Creatinine Ratios:    No results found for: UTPCR  ·   ·   Acid-Base:   Lab Results   Component Value Date     (L) 04/17/2017    K 4.6 04/17/2017    CO2 20 (L) 04/17/2017     2. HTN: Blood pressures not perfectly  well controlled.      4. Anemia: not clear why. Had a colonoscopy in 2015 (had some polyps)    Lab Results   Component Value Date    HGB 10.8 (L) 04/13/2017           Follow up in 4 wks with labs. Labs today

## 2017-05-03 ENCOUNTER — OFFICE VISIT (OUTPATIENT)
Dept: OBSTETRICS AND GYNECOLOGY | Facility: CLINIC | Age: 67
End: 2017-05-03
Attending: INTERNAL MEDICINE
Payer: MEDICARE

## 2017-05-03 VITALS
HEIGHT: 66 IN | WEIGHT: 213.38 LBS | DIASTOLIC BLOOD PRESSURE: 70 MMHG | SYSTOLIC BLOOD PRESSURE: 120 MMHG | BODY MASS INDEX: 34.29 KG/M2

## 2017-05-03 DIAGNOSIS — Z01.419 WELL FEMALE EXAM WITH ROUTINE GYNECOLOGICAL EXAM: Primary | ICD-10-CM

## 2017-05-03 PROCEDURE — 88175 CYTOPATH C/V AUTO FLUID REDO: CPT

## 2017-05-03 PROCEDURE — 3078F DIAST BP <80 MM HG: CPT | Mod: S$GLB,,, | Performed by: OBSTETRICS & GYNECOLOGY

## 2017-05-03 PROCEDURE — 3074F SYST BP LT 130 MM HG: CPT | Mod: S$GLB,,, | Performed by: OBSTETRICS & GYNECOLOGY

## 2017-05-03 PROCEDURE — G0101 CA SCREEN;PELVIC/BREAST EXAM: HCPCS | Mod: S$GLB,,, | Performed by: OBSTETRICS & GYNECOLOGY

## 2017-05-03 PROCEDURE — 99999 PR PBB SHADOW E&M-EST. PATIENT-LVL II: CPT | Mod: PBBFAC,,, | Performed by: OBSTETRICS & GYNECOLOGY

## 2017-05-03 NOTE — MR AVS SNAPSHOT
[facility] - OB/GYN [address]  [address] [facility] [address]  Phone: [phone]                  Nalini Varma   5/3/2017 10:00 AM   Office Visit    Description:  Female : 1950   Provider:  Elina Wong MD   Department:  [facility] - OB/GYN [address]           Reason for Visit     Gynecologic Exam                To Do List           Future Appointments        Provider Department Dept Phone    2017 1:30 PM [facility] [place] USOP2 [facility] [phone]    2017 10:30 AM Johnson Zamora MD [facility] - Pulmonary Services [phone]    2017 10:30 AM [facility] [place] CT OP LIMIT 450 LBS [facility] [phone]      Goals (5 Years of Data)     None      [facility]     [facility] Nurse Care Line -  Assistance  Unless otherwise directed by your provider, please contact [facility], our nurse care line that is available for  assistance.     Registered nurses in the [facility] provide: appointment scheduling, clinical advisement, health education, and other advisory services.  Call: [phone] (toll free)               Medications                Verify that the below list of medications is an accurate representation of the medications you are currently taking.  If none reported, the list may be blank. If incorrect, please contact your healthcare provider. Carry this list with you in case of emergency.           Current Medications     albuterol 90 mcg/actuation inhaler Inhale 2 puffs into the lungs every 6 (six) hours as needed for Wheezing.    alendronate (FOSAMAX) 70 MG tablet Take 1 tablet (70 mg total) by mouth every 7 days.    amlodipine (NORVASC) 5 MG tablet Take 1 tablet (5 mg total) by mouth once daily.    atorvastatin (LIPITOR) 40 MG tablet Take 1 tablet (40 mg total) by mouth once daily.    calcium carbonate (CALTRATE 600) 600 mg (1,500 mg) Tab Take 1 tablet (600 mg total) by mouth 2 (two) times daily with meals.     "cyanocobalamin 1,000 mcg TbSR Take 1,000 mcg by mouth once daily.    famotidine (PEPCID) 20 MG tablet Take 1 tablet (20 mg total) by mouth 2 (two) times daily.    fluticasone-salmeterol (ADVAIR HFA) 115-21 mcg/actuation HFAA Inhale 2 puffs into the lungs every 12 (twelve) hours.    hydrocodone-acetaminophen 10-325mg (NORCO)  mg Tab Take 1 tablet by mouth every 12 (twelve) hours as needed for Pain.    lisinopril (PRINIVIL,ZESTRIL) 40 MG tablet Take 1 tablet (40 mg total) by mouth once daily.    magnesium oxide 400 mg Cap Take 1 capsule by mouth once daily.    umeclidinium 62.5 mcg/actuation DsDv Inhale 1 puff into the lungs once daily. Controller    walker (ULTRA-LIGHT ROLLATOR) Misc 1 each by Misc.(Non-Drug; Combo Route) route once daily.           Clinical Reference Information           Your Vitals Were     BP Height Weight BMI       120/70 (BP Location: Right arm, Patient Position: Sitting, BP Method: Manual) 5' 6" (1.676 m) 96.8 kg (213 lb 6.5 oz) 34.44 kg/m2       Blood Pressure          Most Recent Value    BP  120/70      Allergies as of 5/3/2017     No Known Allergies      Immunizations Administered on Date of Encounter - 5/3/2017     None      Smoking Cessation     If you would like to quit smoking:   You may be eligible for free services if you are a [place] resident and started smoking cigarettes before September 1, 1988.  Call the Smoking Cessation Trust ([facility]) toll free at ([phone] 589-8085 or ([phone].   Call 1-800-QUIT-NOW if you do not meet the above criteria.   Contact us via email: [email or web address]   View our website for more information: [email or web address]        Language Assistance Services     ATTENTION: Language assistance services are available, free of charge. Please call [phone].      ATENCIÓN: Si habla español, tiene a gao disposición servicios gratuitos de asistencia lingüística. Llame al [phone].     CHÚ Ý: N?u b?n nói Ti?ng Vi?t, có các d?ch v? " h? tr? ngôn ng? mi?n phí dành cho b?n. G?i s? [phone].         [facility] - [facility]N [address] complies with applicable Federal civil rights laws and does not discriminate on the basis of race, color, national origin, age, disability, or sex.

## 2017-05-03 NOTE — LETTER
May 7, 2017      Yane Sahni MD  [address]  [facility] [address]           [facility] - OB/[facility]N [address]  [address] [facility] [address]  Phone: [phone]          Patient: Nalini Varma   MR Number: [id number]   YOB: 1950   Date of Visit: 5/3/2017       Dear Dr. Yane Sahni:    Thank you for referring Nalini Varma to me for evaluation. Attached you will find relevant portions of my assessment and plan of care.    If you have questions, please do not hesitate to call me. I look forward to following Nalini Varma along with you.    Sincerely,    Elina Wong MD    Enclosure  CC:  No Recipients    If you would like to receive this communication electronically, please contact [email or web address] or [phone] to request more information on Medical Referral Source Link access.    For providers and/or their staff who would like to refer a patient to [facility], please contact us through our one-stop-shop provider referral line, [facility] [place], at [phone].    If you feel you have received this communication in error or would no longer like to receive these types of communications, please e-mail [email or web address]

## 2017-05-04 ENCOUNTER — HOSPITAL ENCOUNTER (OUTPATIENT)
Dept: RADIOLOGY | Facility: OTHER | Age: 67
Discharge: HOME | End: 2017-05-04
Attending: INTERNAL MEDICINE
Payer: MEDICARE

## 2017-05-04 DIAGNOSIS — E04.1 THYROID NODULE: ICD-10-CM

## 2017-05-04 PROCEDURE — 10022 US FINE NEEDLE ASPIRATION WITH IMAGING: CPT | Mod: ,,, | Performed by: RADIOLOGY

## 2017-05-04 PROCEDURE — 76942 ECHO GUIDE FOR BIOPSY: CPT | Mod: 26,,, | Performed by: RADIOLOGY

## 2017-05-04 PROCEDURE — 88173 CYTOPATH EVAL FNA REPORT: CPT | Mod: 26,,, | Performed by: PATHOLOGY

## 2017-05-04 PROCEDURE — 88172 CYTP DX EVAL FNA 1ST EA SITE: CPT | Mod: 26,,, | Performed by: PATHOLOGY

## 2017-05-04 PROCEDURE — 88172 CYTP DX EVAL FNA 1ST EA SITE: CPT | Performed by: PATHOLOGY

## 2017-05-04 PROCEDURE — 76942 ECHO GUIDE FOR BIOPSY: CPT | Mod: TC

## 2017-05-04 NOTE — H&P
Consult/H&P Note  Interventional Radiology    Consult Requested By: Kaitlyn    Reason for Consult: L thyroid nodule    SUBJECTIVE:     Chief Complaint: L thyroid nodule    History of Present Illness: 65 yo F with L thyroid nodule.    Past Medical History:   Diagnosis Date    Allergy     Anemia due to gastrointestinal blood loss     LESLY from gastric ulcer due to chronic nsaid use    Anxiety     Arthritis     Gastric ulcer     H/O knee surgery 2001    right    Hx of psychiatric care     Hyperlipidemia     Hypertension     Hyponatremia     Psychiatric problem     Therapy     Tobacco abuse      Past Surgical History:   Procedure Laterality Date    COLONOSCOPY  2015    ESOPHAGOGASTRODUODENOSCOPY  2015    FRACTURE SURGERY      left femur    JOINT REPLACEMENT  2007    left knee x 2, 2nd performed 2/2 to MRSA infection 3 months after 1st surgery    ORIF FEMUR FRACTURE Left     TUBAL LIGATION       Family History   Problem Relation Age of Onset    Hypertension Mother     Alzheimer's disease Mother     Dementia Mother     Depression Mother     Myasthenia gravis Father     Arthritis Father     Rectal cancer Father     Hypertension Brother     Arthritis Brother     Colon cancer Brother     No Known Problems Son     Cancer Sister      brain    Melanoma Neg Hx     Breast cancer Neg Hx      Social History   Substance Use Topics    Smoking status: Current Every Day Smoker     Packs/day: 1.50     Last attempt to quit: 2/3/1967    Smokeless tobacco: Not on file    Alcohol use Yes      Comment: socially, last had cocktails 2-3 weeks ago        Review of Systems:  Constitutional/General:No fever, chills, change in appetite or weight loss.  Hematological/Immuno: no known coagulopathies  Respiratory: no shortness of breath  Cardiovascular: no chest pain  Gastrointestinal: no abdominal pain  Genito-Urinary: no dysuria  Musculoskeletal: negative  Skin: Negative for rash, itching, pigmentation changes,  nail or hair changes.  Neurological: no TIA or stroke symptoms  Psychiatric: normal mood/affect, good insight/judgement      OBJECTIVE:     Vital Signs Range (Last 24H):       Physical Exam:  General- Patient alert and oriented x3 in NAD  ENT- PERRLA,  Neck- No masses  CV- Regular rate and rhythm  Resp-  No increased WOB  GI- Non tender/non-distended  Extrem- No cyanosis, clubbing, edema.   Derm- No rashes, masses, or lesions noted  Neuro-  No focal deficits noted.     Physical Exam  There is no height or weight on file to calculate BMI.    Scheduled Meds:   Continuous Infusions:   PRN Meds:    Allergies: Review of patient's allergies indicates:  No Known Allergies    Labs:  No results for input(s): INR in the last 168 hours.    Invalid input(s):  PT,  PTT  No results for input(s): WBC, HGB, HCT, MCV, PLT in the last 168 hours. No results for input(s): GLU, NA, K, CL, CO2, BUN, CREATININE, CALCIUM, MG, ALT, AST, ALBUMIN, BILITOT, BILIDIR in the last 168 hours.    Vitals (Most Recent):       ASA: 2  Mallampati: 2    Consent obtained    ASSESSMENT/PLAN:     L thyroid nodule FNA.    There are no hospital problems to display for this patient.          Kavon Crum MD

## 2017-05-04 NOTE — DISCHARGE SUMMARY
Radiology Post-Procedure Note    Pre Op Diagnosis: L thyroid nodule  Post Op Diagnosis: Same    Procedure: L thyroid FNA    Procedure performed by: Radames    Written Informed Consent Obtained: Yes  Specimen Removed: YES 3 FNA specimens  Estimated Blood Loss: Minimal    Findings:   Successful L thyroid FNA    Patient tolerated procedure well.    @SIG@

## 2017-05-08 NOTE — PROGRESS NOTES
SUBJECTIVE:   66 y.o. female   for routine gyn exam. No LMP recorded. Patient is postmenopausal..  She has no unusual complaints.  No HRT. No PMB.         Past Medical History:   Diagnosis Date    Allergy     Anemia due to gastrointestinal blood loss     LESLY from gastric ulcer due to chronic nsaid use    Anxiety     Arthritis     Gastric ulcer     H/O knee surgery 2001    right    Hx of psychiatric care     Hyperlipidemia     Hypertension     Hyponatremia     Psychiatric problem     Therapy     Tobacco abuse      Past Surgical History:   Procedure Laterality Date    COLONOSCOPY      ESOPHAGOGASTRODUODENOSCOPY  2015    FRACTURE SURGERY      left femur    JOINT REPLACEMENT  2007    left knee x 2, 2nd performed  to MRSA infection 3 months after 1st surgery    ORIF FEMUR FRACTURE Left     TUBAL LIGATION       Social History     Social History    Marital status: Single     Spouse name: N/A    Number of children: 2    Years of education: N/A     Occupational History    unemployed      Social History Main Topics    Smoking status: Current Every Day Smoker     Packs/day: 1.50     Last attempt to quit: 2/3/1967    Smokeless tobacco: Not on file    Alcohol use Yes      Comment: socially, last had cocktails 2-3 weeks ago     Drug use: No    Sexual activity: Not Currently     Birth control/ protection: Abstinence     Other Topics Concern    Are You Pregnant Or Think You May Be? No     Social History Narrative    Originally from [place]    Living in [facility] since     Living in [facility]     Family History   Problem Relation Age of Onset    Hypertension Mother     Alzheimer's disease Mother     Dementia Mother     Depression Mother     Myasthenia gravis Father     Arthritis Father     Rectal cancer Father     Hypertension Brother     Arthritis Brother     Colon cancer Brother     No Known Problems Son     Cancer Sister      brain    Melanoma Neg Hx     Breast cancer Neg  Hx      OB History    Para Term  AB SAB TAB Ectopic Multiple Living   2 2        2      # Outcome Date GA Lbr Maximus/2nd Weight Sex Delivery Anes PTL Lv   2 Para            1 Para                     Current Outpatient Prescriptions   Medication Sig Dispense Refill    albuterol 90 mcg/actuation inhaler Inhale 2 puffs into the lungs every 6 (six) hours as needed for Wheezing. 6.7 g 11    alendronate (FOSAMAX) 70 MG tablet Take 1 tablet (70 mg total) by mouth every 7 days. (Patient taking differently: Take 70 mg by mouth every 7 days. on Wednesday) 12 tablet 3    amlodipine (NORVASC) 5 MG tablet Take 1 tablet (5 mg total) by mouth once daily. 30 tablet 3    atorvastatin (LIPITOR) 40 MG tablet Take 1 tablet (40 mg total) by mouth once daily. 90 tablet 3    calcium carbonate (CALTRATE 600) 600 mg (1,500 mg) Tab Take 1 tablet (600 mg total) by mouth 2 (two) times daily with meals.  0    cyanocobalamin 1,000 mcg TbSR Take 1,000 mcg by mouth once daily. 90 tablet 1    famotidine (PEPCID) 20 MG tablet Take 1 tablet (20 mg total) by mouth 2 (two) times daily. 60 tablet 5    fluticasone-salmeterol (ADVAIR HFA) 115-21 mcg/actuation HFAA Inhale 2 puffs into the lungs every 12 (twelve) hours. 36 g 3    hydrocodone-acetaminophen 10-325mg (NORCO)  mg Tab Take 1 tablet by mouth every 12 (twelve) hours as needed for Pain. 60 tablet 0    lisinopril (PRINIVIL,ZESTRIL) 40 MG tablet Take 1 tablet (40 mg total) by mouth once daily. 90 tablet 1    magnesium oxide 400 mg Cap Take 1 capsule by mouth once daily.      umeclidinium 62.5 mcg/actuation DsDv Inhale 1 puff into the lungs once daily. Controller 30 each 11    walker (ULTRA-LIGHT ROLLATOR) Misc 1 each by Misc.(Non-Drug; Combo Route) route once daily. 1 each 0     No current facility-administered medications for this visit.      Allergies: Review of patient's allergies indicates no known allergies.     ROS:  Constitutional: no weight loss, weight gain,  "fever, fatigue  Eyes:  No vision changes, glasses/contacts  ENT/Mouth: No ulcers, sinus problems, ears ringing, headache  Cardiovascular: No inability to lie flat, chest pain, exercise intolerance, swelling, heart palpitations  Respiratory: No wheezing, coughing blood, shortness of breath, or cough  Gastrointestinal: No diarrhea, bloody stool, nausea/vomiting, constipation, gas, hemorrhoids  Genitourinary: No blood in urine, painful urination, urgency of urination, frequency of urination, incomplete emptying, incontinence, abnormal bleeding, painful periods, heavy periods, vaginal discharge, vaginal odor, painful intercourse, sexual problems, bleeding after intercourse.  Musculoskeletal: No muscle weakness  Skin/Breast: No painful breasts, nipple discharge, masses, rash, ulcers  Neurological: No passing out, seizures, numbness, headache  Endocrine: No diabetes, hypothyroid, hyperthyroid, hot flashes, hair loss, abnormal hair growth, ance  Psychiatric: No depression, crying  Hematologic: No bruises, bleeding, swollen lymph nodes, anemia.      OBJECTIVE:   The patient appears well, alert, oriented x 3, in no distress.  /70 (BP Location: Right arm, Patient Position: Sitting, BP Method: Manual)  Ht 5' 6" (1.676 m)  Wt 96.8 kg (213 lb 6.5 oz)  BMI 34.44 kg/m2  NECK: no thyromegaly, trachea midline  SKIN: no acne, striae, hirsutism  CHEST: CTAB  CV: RRR  BREAST EXAM: breasts appear normal, no suspicious masses, no skin or nipple changes or axillary nodes  ABDOMEN: no hernias, masses, or hepatosplenomegaly  GENITALIA: normal external genitalia, no erythema, no discharge  URETHRA: normal urethra, normal urethral meatus  VAGINA: Normal  CERVIX: no lesions or cervical motion tenderness  UTERUS: normal size, contour, position, consistency, mobility, non-tender  ADNEXA: no mass, fullness, tenderness      ASSESSMENT:   1. Well female exam with routine gynecological exam  Liquid-based pap smear, screening       PLAN: "   Orders Placed This Encounter    Liquid-based pap smear, screening     Return to clinic in 1 year

## 2017-05-09 ENCOUNTER — PATIENT MESSAGE (OUTPATIENT)
Dept: INTERNAL MEDICINE | Facility: CLINIC | Age: 67
End: 2017-05-09

## 2017-05-10 RX ORDER — HYDROCODONE BITARTRATE AND ACETAMINOPHEN 10; 325 MG/1; MG/1
1 TABLET ORAL EVERY 12 HOURS PRN
Qty: 60 TABLET | Refills: 0 | Status: SHIPPED | OUTPATIENT
Start: 2017-05-10 | End: 2017-06-07 | Stop reason: SDUPTHER

## 2017-05-10 NOTE — TELEPHONE ENCOUNTER
Please notify pt that her thyroid biopsy report returned and showed NO cancer - it is a benign nodule. rec check a thyroid ultrasound in 1 year to monitor the size of the nodule (no biopsy with this).     Pain med was refilled

## 2017-05-18 ENCOUNTER — OFFICE VISIT (OUTPATIENT)
Dept: PULMONOLOGY | Facility: CLINIC | Age: 67
End: 2017-05-18
Payer: MEDICARE

## 2017-05-18 VITALS
WEIGHT: 221.31 LBS | HEART RATE: 90 BPM | HEIGHT: 67 IN | DIASTOLIC BLOOD PRESSURE: 54 MMHG | SYSTOLIC BLOOD PRESSURE: 102 MMHG | OXYGEN SATURATION: 95 % | BODY MASS INDEX: 34.73 KG/M2

## 2017-05-18 DIAGNOSIS — J44.9 CHRONIC OBSTRUCTIVE PULMONARY DISEASE, UNSPECIFIED COPD TYPE: Primary | ICD-10-CM

## 2017-05-18 DIAGNOSIS — R91.1 PULMONARY NODULE: ICD-10-CM

## 2017-05-18 DIAGNOSIS — F17.200 TOBACCO DEPENDENCE: ICD-10-CM

## 2017-05-18 PROCEDURE — 99999 PR PBB SHADOW E&M-EST. PATIENT-LVL III: CPT | Mod: PBBFAC,,, | Performed by: INTERNAL MEDICINE

## 2017-05-18 PROCEDURE — 3074F SYST BP LT 130 MM HG: CPT | Mod: S$GLB,,, | Performed by: INTERNAL MEDICINE

## 2017-05-18 PROCEDURE — 1159F MED LIST DOCD IN RCRD: CPT | Mod: S$GLB,,, | Performed by: INTERNAL MEDICINE

## 2017-05-18 PROCEDURE — 1160F RVW MEDS BY RX/DR IN RCRD: CPT | Mod: S$GLB,,, | Performed by: INTERNAL MEDICINE

## 2017-05-18 PROCEDURE — 99204 OFFICE O/P NEW MOD 45 MIN: CPT | Mod: S$GLB,,, | Performed by: INTERNAL MEDICINE

## 2017-05-18 PROCEDURE — 99499 UNLISTED E&M SERVICE: CPT | Mod: S$PBB,,, | Performed by: INTERNAL MEDICINE

## 2017-05-18 PROCEDURE — 3078F DIAST BP <80 MM HG: CPT | Mod: S$GLB,,, | Performed by: INTERNAL MEDICINE

## 2017-05-18 NOTE — PROGRESS NOTES
Subjective:       Patient ID: Nalini Varma is a 66 y.o. female.    Chief Complaint: COPD and Pulmonary Nodules    HPI   Nalini Varma 66 y.o. female    has a past medical history of Allergy; Anemia due to gastrointestinal blood loss; Anxiety; Arthritis; Gastric ulcer; H/O knee surgery (2001); psychiatric care; Hyperlipidemia; Hypertension; Hyponatremia; Psychiatric problem; Therapy; and Tobacco abuse.    has a past surgical history that includes Joint replacement (2007); ORIF femur fracture (Left); Fracture surgery; Esophagogastroduodenoscopy (2015); Colonoscopy (2015); and Tubal ligation.   reports that she has been smoking.  She has been smoking about 1.50 packs per day. She does not have any smokeless tobacco history on file. She reports that she drinks alcohol. She reports that she does not use illicit drugs.  Referred by: Dr. Roxanne Gee  Who had concerns including COPD and Pulmonary Nodules.  The patient's last visit with me was on Visit date not found.      occ sob with exertion, here for pulmonary nodules  Copd- 50years, max 2ppd  No history of cancer  No prior ct chest  +cough- min yellow sputum, per patient from sinuses, allergies    Review of Systems   All other systems reviewed and are negative.      Objective:      Physical Exam   Constitutional: She is oriented to person, place, and time. She appears well-developed and well-nourished. She appears not cachectic. No distress. She is not obese.   HENT:   Head: Normocephalic.   Nose: Nose normal. No mucosal edema.   Mouth/Throat: Normal dentition. No oropharyngeal exudate.   Neck: Normal range of motion. Neck supple. No tracheal deviation present.   Cardiovascular: Normal rate, regular rhythm and intact distal pulses.  Exam reveals no gallop and no friction rub.    Murmur heard.  Pulmonary/Chest: Normal expansion, symmetric chest wall expansion, effort normal and breath sounds normal. No stridor.   Abdominal: Soft. Bowel sounds are normal.    Musculoskeletal: Normal range of motion. She exhibits edema. She exhibits no tenderness or deformity.   Lymphadenopathy: No supraclavicular adenopathy is present.     She has no cervical adenopathy.   Neurological: She is alert and oriented to person, place, and time. No cranial nerve deficit. Gait normal.   Skin: Skin is warm and dry. No rash noted. She is not diaphoretic. No cyanosis or erythema. No pallor. Nails show no clubbing.   Psychiatric: She has a normal mood and affect. Her behavior is normal. Judgment and thought content normal.     Personal Diagnostic Review    No flowsheet data found.      Assessment:       No diagnosis found.    Outpatient Encounter Prescriptions as of 5/18/2017   Medication Sig Dispense Refill    albuterol 90 mcg/actuation inhaler Inhale 2 puffs into the lungs every 6 (six) hours as needed for Wheezing. 6.7 g 11    alendronate (FOSAMAX) 70 MG tablet Take 1 tablet (70 mg total) by mouth every 7 days. (Patient taking differently: Take 70 mg by mouth every 7 days. on Wednesday) 12 tablet 3    amlodipine (NORVASC) 5 MG tablet Take 1 tablet (5 mg total) by mouth once daily. 30 tablet 3    atorvastatin (LIPITOR) 40 MG tablet Take 1 tablet (40 mg total) by mouth once daily. 90 tablet 3    calcium carbonate (CALTRATE 600) 600 mg (1,500 mg) Tab Take 1 tablet (600 mg total) by mouth 2 (two) times daily with meals.  0    cyanocobalamin 1,000 mcg TbSR Take 1,000 mcg by mouth once daily. 90 tablet 1    famotidine (PEPCID) 20 MG tablet Take 1 tablet (20 mg total) by mouth 2 (two) times daily. 60 tablet 5    fluticasone-salmeterol (ADVAIR HFA) 115-21 mcg/actuation HFAA Inhale 2 puffs into the lungs every 12 (twelve) hours. 36 g 3    hydrocodone-acetaminophen 10-325mg (NORCO)  mg Tab Take 1 tablet by mouth every 12 (twelve) hours as needed for Pain. 60 tablet 0    lisinopril (PRINIVIL,ZESTRIL) 40 MG tablet Take 1 tablet (40 mg total) by mouth once daily. 90 tablet 1    magnesium  oxide 400 mg Cap Take 1 capsule by mouth once daily.      umeclidinium 62.5 mcg/actuation DsDv Inhale 1 puff into the lungs once daily. Controller 30 each 11    walker (ULTRA-LIGHT ROLLATOR) Misc 1 each by Misc.(Non-Drug; Combo Route) route once daily. 1 each 0     No facility-administered encounter medications on file as of 5/18/2017.      No orders of the defined types were placed in this encounter.    Plan:             I personally reviewed the      1. Echo report   2. PFT   3. CXR   4. CXR report   5. CT chest   6. CT chest report     Assessment:  Nalini was seen today for copd and pulmonary nodules.    Diagnoses and all orders for this visit:    Chronic obstructive pulmonary disease, unspecified COPD type    Pulmonary nodule    Tobacco dependence    Other orders  -     Ambulatory referral to Smoking Cessation Program        Plan:  Repeat ct 7/2017  Patient to call me after ct is done- Will call her with results  Smoking cessation    Return in about 6 months (around 11/18/2017).    There are no Patient Instructions on file for this visit.    Immunization History   Administered Date(s) Administered    Influenza - High Dose 02/01/2015, 02/03/2016, 01/18/2017    Pneumococcal Conjugate - 13 Valent 02/01/2015, 02/03/2016    Pneumococcal Polysaccharide - 23 Valent 02/03/2015

## 2017-05-18 NOTE — LETTER
May 18, 2017      Roxanne Gee MD  [address] [facility] [address]           [facility]  [facility]  [place] [address]  Phone: [phone]          Patient: Nalini Varma   MR Number: [id number]   YOB: 1950   Date of Visit: 5/18/2017       Dear Dr. Roxanne Gee:    Thank you for referring Nalini Varma to me for evaluation. Attached you will find relevant portions of my assessment and plan of care.    If you have questions, please do not hesitate to call me. I look forward to following Nalini Varma along with you.    Sincerely,    Johnson Zamora MD    Enclosure  CC:  No Recipients    If you would like to receive this communication electronically, please contact [email or web address] or [phone] to request more information on [employer] Link access.    For providers and/or their staff who would like to refer a patient to [facility], please contact us through our one-stop-shop provider referral line, [facility], at [phone].    If you feel you have received this communication in error or would no longer like to receive these types of communications, please e-mail [email or web address]

## 2017-05-28 RX ORDER — BUPROPION HYDROCHLORIDE 150 MG/1
TABLET ORAL
Qty: 60 TABLET | Refills: 0 | Status: SHIPPED | OUTPATIENT
Start: 2017-05-28 | End: 2017-06-28 | Stop reason: SINTOL

## 2017-06-07 ENCOUNTER — PATIENT MESSAGE (OUTPATIENT)
Dept: INTERNAL MEDICINE | Facility: CLINIC | Age: 67
End: 2017-06-07

## 2017-06-07 RX ORDER — HYDROCODONE BITARTRATE AND ACETAMINOPHEN 10; 325 MG/1; MG/1
1 TABLET ORAL EVERY 12 HOURS PRN
Qty: 60 TABLET | Refills: 0 | OUTPATIENT
Start: 2017-06-07

## 2017-06-07 RX ORDER — HYDROCODONE BITARTRATE AND ACETAMINOPHEN 10; 325 MG/1; MG/1
1 TABLET ORAL EVERY 12 HOURS PRN
Qty: 60 TABLET | Refills: 0 | Status: SHIPPED | OUTPATIENT
Start: 2017-06-09 | End: 2017-07-06 | Stop reason: SDUPTHER

## 2017-06-07 NOTE — TELEPHONE ENCOUNTER
Called pt and left a vm stating that  did not states if she is due for an visit but I did state to pt that i will send her messages over to  to see if she is over due for a clinic visit or any labs.

## 2017-06-07 NOTE — TELEPHONE ENCOUNTER
Pt is due for 3 month f/u at end of June - please schedule in 1-2 weeks with me - no labs are needed at this time.

## 2017-06-09 NOTE — TELEPHONE ENCOUNTER
----- Message from Lea Rivas sent at 6/9/2017  2:20 PM CDT -----  Contact: ELIEZER RODRIGUEZ [[id number]]  _x  1st Request  _  2nd Request  _  3rd Request        Who: ELIEZER RODRIGUEZ [[id number]]    Why: pt would like [facility] medication refilled. Please call, Thanks!    What Number to Call Back: [phone]    When to Expect a call back: (Before the end of the day)   -- if the call is after 12:00, the call back will be tomorrow.

## 2017-06-28 ENCOUNTER — OFFICE VISIT (OUTPATIENT)
Dept: PSYCHIATRY | Facility: CLINIC | Age: 67
End: 2017-06-28
Payer: COMMERCIAL

## 2017-06-28 VITALS — HEART RATE: 89 BPM | SYSTOLIC BLOOD PRESSURE: 127 MMHG | HEIGHT: 66 IN | DIASTOLIC BLOOD PRESSURE: 58 MMHG

## 2017-06-28 DIAGNOSIS — F17.200 TOBACCO DEPENDENCE: ICD-10-CM

## 2017-06-28 DIAGNOSIS — F41.1 GAD (GENERALIZED ANXIETY DISORDER): Primary | ICD-10-CM

## 2017-06-28 PROCEDURE — 99499 UNLISTED E&M SERVICE: CPT | Mod: S$PBB,,, | Performed by: PSYCHIATRY & NEUROLOGY

## 2017-06-28 PROCEDURE — 99212 OFFICE O/P EST SF 10 MIN: CPT | Mod: S$GLB,,, | Performed by: PSYCHIATRY & NEUROLOGY

## 2017-06-28 PROCEDURE — 99999 PR PBB SHADOW E&M-EST. PATIENT-LVL II: CPT | Mod: PBBFAC,,, | Performed by: PSYCHIATRY & NEUROLOGY

## 2017-06-28 PROCEDURE — 1159F MED LIST DOCD IN RCRD: CPT | Mod: S$GLB,,, | Performed by: PSYCHIATRY & NEUROLOGY

## 2017-06-28 NOTE — PROGRESS NOTES
6/28/2017 9:19 AM   Nalini Varma   1950   [id number]           OUTPATIENT PSYCHIATRY FOLLOW- UP VISIT    Reason for Encounter:  Nalini Varma, a 66 y.o. female,who presents today for follow up of anxiety and adjustment problems. . Met with patient.    Interval History and Content of Current Session:    Today,  Pt reports that she has her own apartment now and is very happy about it. She states that she lives in a condo and its a safe environment she lives in. She states that initally she was worried about about the move and finding the apartment. She reports that her mood is better and anxiety is reduced                 Close friend recently moved to [facility] and pt is happy about that  Pt plans to go to [place] smoking cessation program for stopping to smoke and wants to get on Chantix. Reports other than that she has no other psychiatric complains today.     Social stressors:  Financial difficulty     Psychiatric Review Of Systems - Is patient experiencing or having changes in:    Symptoms of Depression: No longer has any diminished mood or loss of interest/anhedonia; irritability, diminished energy, change in sleep, change in appetite, diminished concentration or cognition or indecisiveness, PMA/R, excessive guilt or hopelessness or worthlessness, suicidal ideations    Symptoms of MARY: No longer has anyexcessive anxiety/worry/fear, more days than not, about numerous issues, difficult to control, with restlessness, fatigue, poor concentration, irritability, muscle tension, sleep disturbance; causes functionally impairing distress     Symptoms of zeenat or hypomania: NO elevated, expansive, or irritable mood with increased energy or activity; with inflated self-esteem or grandiosity, decreased need for sleep, increased rate of speech, FOI or racing thoughts, distractibility, increased goal directed activity or PMA, risky/disinhibited behavior    Symptoms of psychosis: NO hallucinations, delusions,  "disorganized thinking, disorganized behavior or abnormal motor behavior, or negative symptoms (diminshed emotional expression, avolition, anhedonia, alogia, asociality     Sleep: NO Problems with initiation, maintenance, early morning awakening with inability to return to sleep      Risk Parameters:  Patient reports no suicidal ideation  Patient reports no homicidal ideation  Patient reports no self-injurious behavior  Patient reports no violent behavior    Psychotropic medication review  Previous Trials-  Pt unable to remember other medications  zoloft and wellbutrin- didn't help and caused hypomagnesia    Per previous visit plan  ? Start Wellbutrin 150 XL x 3 days then increase to 300mg   ? Pt d/c zoloft as it caused pt to have hypomagnesia and hyponatremia     Current meds-  Pt stopped her Wellbutrin because she had hypomagnesia and hyponatremia with it    Substance use  Tobacco-  > 1 ppd    ETOH-  None    Illicit substances-  none    Review of Systems     Past Medical, Family and Social History: The patient's past medical, family and social history have been reviewed and updated as appropriate within the electronic medical record - see encounter notes.    Compliance: no    Side effects: see above      Objective     Constitutional  Vitals:  Most recent vital signs, dated less than 90 days prior to this appointment, were reviewed.    Vitals:    06/28/17 0920   BP: (!) 127/58   Pulse: 89   Height: 5' 6" (1.676 m)            Laboratory Data: reviewed most recent labs and noted any abnormalities.    Medications:  Outpatient Encounter Prescriptions as of 6/28/2017   Medication Sig Dispense Refill    albuterol 90 mcg/actuation inhaler Inhale 2 puffs into the lungs every 6 (six) hours as needed for Wheezing. 6.7 g 11    alendronate (FOSAMAX) 70 MG tablet Take 1 tablet (70 mg total) by mouth every 7 days. (Patient taking differently: Take 70 mg by mouth every 7 days. on Wednesday) 12 tablet 3    amlodipine (NORVASC) 5 " MG tablet Take 1 tablet (5 mg total) by mouth once daily. 30 tablet 3    atorvastatin (LIPITOR) 40 MG tablet Take 1 tablet (40 mg total) by mouth once daily. 90 tablet 3    buPROPion (WELLBUTRIN XL) 150 MG TB24 tablet TAKE 1 TABLET BY MOUTH DAILY FOR 3 DAYS, THEN INCREASE TO 2 TABLETS DAILY 60 tablet 0    calcium carbonate (CALTRATE 600) 600 mg (1,500 mg) Tab Take 1 tablet (600 mg total) by mouth 2 (two) times daily with meals.  0    cyanocobalamin 1,000 mcg TbSR Take 1,000 mcg by mouth once daily. 90 tablet 1    famotidine (PEPCID) 20 MG tablet Take 1 tablet (20 mg total) by mouth 2 (two) times daily. 60 tablet 5    fluticasone-salmeterol (ADVAIR HFA) 115-21 mcg/actuation HFAA Inhale 2 puffs into the lungs every 12 (twelve) hours. 36 g 3    hydrocodone-acetaminophen 10-325mg (NORCO)  mg Tab Take 1 tablet by mouth every 12 (twelve) hours as needed for Pain. 60 tablet 0    lisinopril (PRINIVIL,ZESTRIL) 40 MG tablet Take 1 tablet (40 mg total) by mouth once daily. 90 tablet 1    magnesium oxide 400 mg Cap Take 1 capsule by mouth once daily.      umeclidinium 62.5 mcg/actuation DsDv Inhale 1 puff into the lungs once daily. Controller 30 each 11    walker (ULTRA-LIGHT ROLLATOR) Misc 1 each by Misc.(Non-Drug; Combo Route) route once daily. 1 each 0     No facility-administered encounter medications on file as of 6/28/2017.        Review of patient's allergies indicates:   Allergen Reactions    Wellbutrin [bupropion hcl] Other (See Comments)     Hyponatremia and hypomagnesia     Zoloft [sertraline] Other (See Comments)     Hyponatremia and hypomagnesia          Nutritional Screening: Considering the patient's height and weight, medications, medical history and preferences, should a referral be made to the dietitian? no    Review of Systems - History obtained from the patient  General ROS: negative for - chills, fatigue or fever  Respiratory ROS: no cough, shortness of breath, or wheezing  Cardiovascular  "ROS: no chest pain or dyspnea on exertion  Gastrointestinal ROS: no abdominal pain, change in bowel habits, or black or bloody stools  Musculoskeletal ROS:no dystonia, no tremor; unsteady, wide based, uses walker  Neurological ROS: no TIA or stroke symptoms    AIMS*    Mental Status Exam:  Appearance: unremarkable, age appropriate, casually dressed  Behavior/Cooperation:appropriate friendly and cooperative eye contact normal  Speech: appropriate volume spontaneous, rapid  Language: uses words appropriately; NO aphasia or dysarthria  Mood: "  Much better "  Affect:   steady, happy congruent with mood and appropriate to situation/content   Thought Process:  circumstantial, tangential  Thought Content: normal, no suicidality, no homicidality, delusions, or paranoia  Sensorium:  Awake  Alert and Oriented: x3 grossly intact  Memory: Intact to conversation both recent and remote  Attention/concentration: appropriate for age/education and intact to conversation  Insight: Intact  Judgment:Intact      Assessment and Diagnosis   Status/Progress: Based on the examination today, the patient's problem(s) is/are improved and well controlled.  New problems have been presented today.   Co-morbidities are complicating management of the primary condition.  There are no active rule-out diagnoses for this patient at this time.     General Impression:       ICD-10-CM ICD-9-CM   1. MARY (generalized anxiety disorder) F41.1 300.02   2. Tobacco dependence F17.200 305.1       Intervention/Counseling/Treatment Plan   · Medication Management: -   · None required as symptoms are resolved  · Pt plans to f/u with [place] smoking cessation program for assistance with quitting smoking   · Labs: reviewed most recent  · The treatment plan and follow up plan were reviewed with the patient.  · Discussed with patient informed consent, risks vs. benefits, alternative treatments, side effect profile and the inherent unpredictability of individual " responses to these treatments. The patient expresses understanding of the above and displays the capacity to agree with this current plan and had no other questions.  · Encouraged Patient to keep future appointments.   · Take medications as prescribed and abstain from substance abuse.   · In the event of an emergency patient was advised to go to the emergency room.    Return to Clinic: as needed    Coordination of care /Counseling time: > than 50% of total time was spend on this including reviewing recent medication changes, medical problems and social stressors.  Add on Psychotherapy time:0  Total Face time:25mins    KIT CLARK MD   [facility]   6/28/2017 9:19 AM

## 2017-07-06 ENCOUNTER — PATIENT MESSAGE (OUTPATIENT)
Dept: INTERNAL MEDICINE | Facility: CLINIC | Age: 67
End: 2017-07-06

## 2017-07-07 RX ORDER — HYDROCODONE BITARTRATE AND ACETAMINOPHEN 10; 325 MG/1; MG/1
1 TABLET ORAL EVERY 12 HOURS PRN
Qty: 60 TABLET | Refills: 0 | Status: SHIPPED | OUTPATIENT
Start: 2017-07-09 | End: 2017-08-07 | Stop reason: SDUPTHER

## 2017-07-21 ENCOUNTER — OFFICE VISIT (OUTPATIENT)
Dept: INTERNAL MEDICINE | Facility: CLINIC | Age: 67
End: 2017-07-21
Attending: INTERNAL MEDICINE
Payer: MEDICARE

## 2017-07-21 VITALS
OXYGEN SATURATION: 97 % | SYSTOLIC BLOOD PRESSURE: 126 MMHG | WEIGHT: 217.38 LBS | DIASTOLIC BLOOD PRESSURE: 78 MMHG | BODY MASS INDEX: 34.93 KG/M2 | HEIGHT: 66 IN | HEART RATE: 96 BPM

## 2017-07-21 DIAGNOSIS — J44.9 CHRONIC OBSTRUCTIVE PULMONARY DISEASE, UNSPECIFIED COPD TYPE: ICD-10-CM

## 2017-07-21 DIAGNOSIS — F17.200 TOBACCO DEPENDENCE: ICD-10-CM

## 2017-07-21 DIAGNOSIS — G89.28 CHRONIC PAIN FOLLOWING SURGERY OR PROCEDURE: ICD-10-CM

## 2017-07-21 DIAGNOSIS — D51.8 OTHER VITAMIN B12 DEFICIENCY ANEMIA: ICD-10-CM

## 2017-07-21 DIAGNOSIS — E83.42 HYPOMAGNESEMIA: ICD-10-CM

## 2017-07-21 DIAGNOSIS — E78.5 HYPERLIPIDEMIA, UNSPECIFIED HYPERLIPIDEMIA TYPE: ICD-10-CM

## 2017-07-21 DIAGNOSIS — R91.8 PULMONARY NODULES: Primary | ICD-10-CM

## 2017-07-21 DIAGNOSIS — I10 HYPERTENSION, BENIGN: ICD-10-CM

## 2017-07-21 DIAGNOSIS — M81.0 OSTEOPOROSIS, UNSPECIFIED OSTEOPOROSIS TYPE, UNSPECIFIED PATHOLOGICAL FRACTURE PRESENCE: ICD-10-CM

## 2017-07-21 DIAGNOSIS — F11.20 UNCOMPLICATED OPIOID DEPENDENCE: ICD-10-CM

## 2017-07-21 PROCEDURE — 99499 UNLISTED E&M SERVICE: CPT | Mod: S$PBB,,, | Performed by: INTERNAL MEDICINE

## 2017-07-21 PROCEDURE — 99999 PR PBB SHADOW E&M-EST. PATIENT-LVL IV: CPT | Mod: PBBFAC,,, | Performed by: INTERNAL MEDICINE

## 2017-07-21 PROCEDURE — 1125F AMNT PAIN NOTED PAIN PRSNT: CPT | Mod: S$GLB,,, | Performed by: INTERNAL MEDICINE

## 2017-07-21 PROCEDURE — 99214 OFFICE O/P EST MOD 30 MIN: CPT | Mod: S$GLB,,, | Performed by: INTERNAL MEDICINE

## 2017-07-21 PROCEDURE — 1159F MED LIST DOCD IN RCRD: CPT | Mod: S$GLB,,, | Performed by: INTERNAL MEDICINE

## 2017-07-21 NOTE — PROGRESS NOTES
Subjective:       Patient ID: Nalini Varma is a 66 y.o. female.    Chief Complaint: Hyperlipidemia (3mo f/u)    HPI     Pt here for 3 month f/u for chronic pain mgmt. Taking norco twice daily. Reports only mildly helps with pain.   Seen by ortho at  on 12/20 still rec surgery but deferring until permanent housing is established. Since last seen [place] out of [employer]. Is planning on f/u with ortho in next few months to discuss pursuing surgery.     Still smoking - is ready to quit not that out of [place] and away from other smokers. Agrees to tob cess counseling.   Seen by pulm for copd and abnormal screening ct with pulm nodules. Unable to repeat CT canceled due to inability to pay copay - 75 yo 100$ - however she reports that should be able to pay this next month.     Hx of SIADH - sodium stable, s/p renal eval. Taking mag supplement for hypomagnesemia as well - taking 1 dose of 400mg instead of total of 800mg daily    Review of Systems   Constitutional: Negative for unexpected weight change.   HENT: Negative for rhinorrhea and trouble swallowing.    Eyes: Negative for visual disturbance.   Cardiovascular: Negative for chest pain and palpitations.   Gastrointestinal: Negative for blood in stool, constipation, diarrhea and vomiting.   Endocrine: Negative for polydipsia and polyuria.   Genitourinary: Negative for difficulty urinating, dysuria, hematuria and menstrual problem.   Musculoskeletal: Positive for arthralgias. Negative for neck pain.   Neurological: Negative for weakness and headaches.   Psychiatric/Behavioral: Negative for confusion and dysphoric mood.       Objective:      Physical Exam   Constitutional: She is oriented to person, place, and time. She appears well-developed and well-nourished.   HENT:   Head: Normocephalic and atraumatic.   Eyes: Conjunctivae and EOM are normal.   Neck: Neck supple.   Cardiovascular: Normal rate, regular  rhythm and intact distal pulses.    Pulmonary/Chest: Effort normal and breath sounds normal. She has no wheezes. She has no rales.   Abdominal: Soft. Normal appearance and bowel sounds are normal.   Musculoskeletal: She exhibits edema.   Left knee edema > right  no erythema, inc warmth or skin breakdown   Lymphadenopathy:     She has no cervical adenopathy.   Neurological: She is alert and oriented to person, place, and time. She has normal strength. Gait normal.   Skin: Skin is warm, dry and intact. Capillary refill takes less than 2 seconds. No cyanosis. Nails show no clubbing.   Psychiatric: She has a normal mood and affect. Her speech is normal and behavior is normal. Judgment and thought content normal. Cognition and memory are normal.   Vitals reviewed.      Assessment:       Nalini was seen today for hyperlipidemia.    Diagnoses and all orders for this visit:    Pulmonary nodules: followed by pulm - due for repeat - unable to pay copay - refer to CM to see if assistance available   -     Ambulatory referral to Outpatient Case Management    Tobacco dependence: counseled to quit - agrees to tob cess counseling  -     Ambulatory referral to Outpatient Case Management  -     Ambulatory referral to Smoking Cessation Program    Chronic pain following surgery or procedure: under pain contract, check drug screen, cont norco bid prn - approp use discussed with pt - f/u with ortho to discuss possible surgery  -     TOXICOLOGY SCREEN, URINE, RANDOM (COMPLIANCE); Future    Chronic obstructive pulmonary disease, unspecified COPD type: stable, cont inhalers, counseled to quit tob    Hyperlipidemia, unspecified hyperlipidemia type: stable, cont statin    Hypertension, benign: controlled, cont meds    Other vitamin B12 deficiency anemia: cont supplement, check level  -     Vitamin B12; Future    Hypomagnesemia: inc to 400mg bid and repeat labs in 4 weeks as pt not currently taking consistently over past couple of weeks   -      Magnesium; Future  -     Basic metabolic panel; Future    Osteoporosis, unspecified osteoporosis type, unspecified pathological fracture presence: rec otc supplement of calcium 1200mg and vit d 800u divided into 2 doses daily along with reg exercise  Cont alendronate  -     DXA Bone Density Spine And Hip; Future    Uncomplicated opioid dependence: as above

## 2017-07-22 ENCOUNTER — OUTPATIENT CASE MANAGEMENT (OUTPATIENT)
Dept: ADMINISTRATIVE | Facility: OTHER | Age: 67
End: 2017-07-22

## 2017-07-22 NOTE — PROGRESS NOTES
Please note the following patient's information has been forwarded to [facility] for case mgmt or  by Outpatient Case Management.    Please see the media section of patient's chart for additional details.    Please contact Ext. [phone] with any questions.    Thank you,    Kiara Emery, SSC

## 2017-08-06 ENCOUNTER — PATIENT MESSAGE (OUTPATIENT)
Dept: INTERNAL MEDICINE | Facility: CLINIC | Age: 67
End: 2017-08-06

## 2017-08-07 RX ORDER — AMLODIPINE BESYLATE 5 MG/1
5 TABLET ORAL DAILY
Qty: 90 TABLET | Refills: 3 | Status: SHIPPED | OUTPATIENT
Start: 2017-08-07 | End: 2018-01-24 | Stop reason: SDUPTHER

## 2017-08-07 RX ORDER — HYDROCODONE BITARTRATE AND ACETAMINOPHEN 10; 325 MG/1; MG/1
1 TABLET ORAL EVERY 12 HOURS PRN
Qty: 60 TABLET | Refills: 0 | Status: SHIPPED | OUTPATIENT
Start: 2017-08-08 | End: 2017-09-07 | Stop reason: SDUPTHER

## 2017-08-07 NOTE — TELEPHONE ENCOUNTER
rx sent to [facility]  Please notify pt that Rx for amlodipine and norvasc and inhaled med were sent and rec she contact [facility] to inquire about delivery of norco and incruse ellipta (umeclidinium inhaled)

## 2017-08-08 NOTE — TELEPHONE ENCOUNTER
Please call our pharmacy and inquire if they carry this med - I received a notification that it was not on our formulary - if it is not carried here then please notify pt and inquire if she wants further refills sent to [facility] instead

## 2017-08-08 NOTE — TELEPHONE ENCOUNTER
----- Message from Lea Varela sent at 8/8/2017 10:07 AM CDT -----  Contact: ELIEZER RODRIGUEZ [[id number]]  _x  1st Request  _  2nd Request  _  3rd Request        Who: ELIEZER RODRIGUEZ [[id number]]    Why: Patient states she would like her Rx umeclidinium 62.5 mcg/actuation DsDv sent to [facility] - [place], [place] - [address] [facility] [address]    What Number to Call Back: [phone]    When to Expect a call back: (Before the end of the day)   -- if call after 3:00 call back will be tomorrow.

## 2017-08-23 ENCOUNTER — HOSPITAL ENCOUNTER (OUTPATIENT)
Dept: RADIOLOGY | Facility: OTHER | Age: 67
Discharge: HOME | End: 2017-08-23
Attending: INTERNAL MEDICINE
Payer: MEDICARE

## 2017-08-23 ENCOUNTER — CLINICAL SUPPORT (OUTPATIENT)
Dept: SMOKING CESSATION | Facility: CLINIC | Age: 67
End: 2017-08-23
Payer: COMMERCIAL

## 2017-08-23 DIAGNOSIS — M81.0 OSTEOPOROSIS, UNSPECIFIED OSTEOPOROSIS TYPE, UNSPECIFIED PATHOLOGICAL FRACTURE PRESENCE: ICD-10-CM

## 2017-08-23 DIAGNOSIS — F17.210 HEAVY CIGARETTE SMOKER (20-39 PER DAY): Primary | ICD-10-CM

## 2017-08-23 PROCEDURE — 99404 PREV MED CNSL INDIV APPRX 60: CPT | Mod: S$GLB,,,

## 2017-08-23 PROCEDURE — 77080 DXA BONE DENSITY AXIAL: CPT | Mod: TC

## 2017-08-23 PROCEDURE — 99999 PR PBB SHADOW E&M-EST. PATIENT-LVL I: CPT | Mod: PBBFAC,,,

## 2017-08-23 PROCEDURE — 77080 DXA BONE DENSITY AXIAL: CPT | Mod: 26,,, | Performed by: RADIOLOGY

## 2017-08-23 RX ORDER — IBUPROFEN 200 MG
1 TABLET ORAL DAILY
Qty: 14 PATCH | Refills: 0 | Status: SHIPPED | OUTPATIENT
Start: 2017-08-23 | End: 2018-07-13

## 2017-08-23 RX ORDER — VARENICLINE TARTRATE 0.5 (11)-1
KIT ORAL
Qty: 1 PACKAGE | Refills: 0 | Status: SHIPPED | OUTPATIENT
Start: 2017-08-23 | End: 2017-09-20 | Stop reason: SDUPTHER

## 2017-08-29 ENCOUNTER — TELEPHONE (OUTPATIENT)
Dept: INTERNAL MEDICINE | Facility: CLINIC | Age: 67
End: 2017-08-29

## 2017-08-29 DIAGNOSIS — E83.42 HYPOMAGNESEMIA: ICD-10-CM

## 2017-08-29 DIAGNOSIS — S32.030A COMPRESSION FRACTURE OF L3 LUMBAR VERTEBRA, CLOSED, INITIAL ENCOUNTER: ICD-10-CM

## 2017-08-29 DIAGNOSIS — N17.9 AKI (ACUTE KIDNEY INJURY): Primary | ICD-10-CM

## 2017-08-30 NOTE — TELEPHONE ENCOUNTER
Called pt and reviewed labs and bone density  She reports is taking on average 400-800mg magnesium daily  Is taking alendronate as well. Bmp shows mild colin - she agrees to increase fluids - has not been actively restricting fluids for hyponatremia (SIADH) - no MS changes or weakness - feels well overall. No NSAID use    Bone density with L3 compression fx - reviewed dexa from outside facility - no summary present suspect L3 compression fx chronic after reviewing doc and pic L3 on last dexa which was part of research study - she denies trauma or back pain.   Will check Xray lumbar spine, inc mag to 500mg bid, repeat bmp and mag in 2 weeks - cont alendronate - All questions were answered and pt verbalized understanding of plan.     Please schedule xray and labs in 2 weeks

## 2017-09-05 ENCOUNTER — PATIENT MESSAGE (OUTPATIENT)
Dept: ADMINISTRATIVE | Facility: OTHER | Age: 67
End: 2017-09-05

## 2017-09-05 ENCOUNTER — CLINICAL SUPPORT (OUTPATIENT)
Dept: SMOKING CESSATION | Facility: CLINIC | Age: 67
End: 2017-09-05
Payer: COMMERCIAL

## 2017-09-05 DIAGNOSIS — F17.210 HEAVY CIGARETTE SMOKER (20-39 PER DAY): Primary | ICD-10-CM

## 2017-09-05 PROCEDURE — 99407 BEHAV CHNG SMOKING > 10 MIN: CPT | Mod: S$GLB,,,

## 2017-09-07 ENCOUNTER — PATIENT MESSAGE (OUTPATIENT)
Dept: INTERNAL MEDICINE | Facility: CLINIC | Age: 67
End: 2017-09-07

## 2017-09-07 DIAGNOSIS — K21.9 GASTROESOPHAGEAL REFLUX DISEASE WITHOUT ESOPHAGITIS: ICD-10-CM

## 2017-09-07 RX ORDER — FAMOTIDINE 20 MG/1
20 TABLET, FILM COATED ORAL 2 TIMES DAILY
Qty: 180 TABLET | Refills: 3 | Status: SHIPPED | OUTPATIENT
Start: 2017-09-07 | End: 2018-01-24 | Stop reason: SDUPTHER

## 2017-09-07 RX ORDER — HYDROCODONE BITARTRATE AND ACETAMINOPHEN 10; 325 MG/1; MG/1
1 TABLET ORAL EVERY 12 HOURS PRN
Qty: 60 TABLET | Refills: 0 | Status: SHIPPED | OUTPATIENT
Start: 2017-09-07 | End: 2017-10-06 | Stop reason: SDUPTHER

## 2017-09-11 ENCOUNTER — HOSPITAL ENCOUNTER (OUTPATIENT)
Dept: RADIOLOGY | Facility: OTHER | Age: 67
Discharge: HOME | End: 2017-09-11
Attending: INTERNAL MEDICINE
Payer: MEDICARE

## 2017-09-11 DIAGNOSIS — S32.030A COMPRESSION FRACTURE OF L3 LUMBAR VERTEBRA, CLOSED, INITIAL ENCOUNTER: ICD-10-CM

## 2017-09-11 PROCEDURE — 72100 X-RAY EXAM L-S SPINE 2/3 VWS: CPT | Mod: 26,,, | Performed by: RADIOLOGY

## 2017-09-11 PROCEDURE — 72100 X-RAY EXAM L-S SPINE 2/3 VWS: CPT | Mod: TC

## 2017-09-14 ENCOUNTER — CLINICAL SUPPORT (OUTPATIENT)
Dept: SMOKING CESSATION | Facility: CLINIC | Age: 67
End: 2017-09-14
Payer: COMMERCIAL

## 2017-09-14 DIAGNOSIS — F17.210 HEAVY CIGARETTE SMOKER (20-39 PER DAY): Primary | ICD-10-CM

## 2017-09-14 PROCEDURE — 99407 BEHAV CHNG SMOKING > 10 MIN: CPT | Mod: S$GLB,,,

## 2017-09-15 ENCOUNTER — TELEPHONE (OUTPATIENT)
Dept: INTERNAL MEDICINE | Facility: CLINIC | Age: 67
End: 2017-09-15

## 2017-09-15 DIAGNOSIS — E87.1 HYPONATREMIA: ICD-10-CM

## 2017-09-15 DIAGNOSIS — E83.42 HYPOMAGNESEMIA: Primary | ICD-10-CM

## 2017-09-15 RX ORDER — FLUTICASONE PROPIONATE AND SALMETEROL XINAFOATE 115; 21 UG/1; UG/1
2 AEROSOL, METERED RESPIRATORY (INHALATION) EVERY 12 HOURS
Qty: 36 G | Refills: 3 | Status: SHIPPED | OUTPATIENT
Start: 2017-09-15 | End: 2017-11-08 | Stop reason: SDUPTHER

## 2017-09-16 NOTE — TELEPHONE ENCOUNTER
Called pt and reviewed xray and labs.   She denies back pain, LE weakness, numbness, incontinence.   Suspect L3 compression old after viewing dexa in old records in media - will cont alendronate and repeat dexa in 1 year. If not improving will give prolia    Has increased free water and not following a fluid limit after prior labs showed mild colin - colin resolved but hyponatremia worsened - no MS changes or fatigue.   Prior limit was 1L - rec she resume fluid restriction but to 1500ml -   Did not nelson mag supplement 500mg bid 2/2 to diarrhea - she will try inc total daily to 750mg - will plan to repeat bmp and mg in 2 weeks. Er and rtc prompts given    Please schedule labs in 2 weeks

## 2017-09-20 ENCOUNTER — CLINICAL SUPPORT (OUTPATIENT)
Dept: SMOKING CESSATION | Facility: CLINIC | Age: 67
End: 2017-09-20
Payer: COMMERCIAL

## 2017-09-20 DIAGNOSIS — F17.210 MODERATE CIGARETTE SMOKER (10-19 PER DAY): Primary | ICD-10-CM

## 2017-09-20 PROCEDURE — 99999 PR PBB SHADOW E&M-EST. PATIENT-LVL I: CPT | Mod: PBBFAC,,,

## 2017-09-20 PROCEDURE — 99402 PREV MED CNSL INDIV APPRX 30: CPT | Mod: S$GLB,,,

## 2017-09-20 RX ORDER — VARENICLINE TARTRATE 1 MG/1
1 TABLET, FILM COATED ORAL 2 TIMES DAILY
Qty: 60 TABLET | Refills: 0 | Status: SHIPPED | OUTPATIENT
Start: 2017-09-20 | End: 2017-10-24 | Stop reason: SDUPTHER

## 2017-09-20 NOTE — Clinical Note
Patient reduced tobacco intake from 20-25 to 17 per day. Refilled Chantix. The patient denies any abnormal behavioral or mental changes at this time.

## 2017-09-20 NOTE — PROGRESS NOTES
Individual Follow-Up Form    9/20/2017    Quit Date: TBD    Clinical Status of Patient: Outpatient    Length of Service: 30 minutes    Continuing Medication: yes  Chantix    Other Medications: none     Target Symptoms: Withdrawal and medication side effects. The following were  rated moderate (3) to severe (4) on TCRS:  · Moderate (3): desire  · Severe (4): none    Comments: Patient here for follow up. Smokes 17 per day. Commended her on her progress because she thought she could not reduced as far as she did. Goal was set at 15. completion of TCRS (Tobacco Cessation Rating Scale) reviewed strategies, cues, and triggers. Introduced the negative impact of tobacco on health, the health advantages of discontinuing the use of tobacco, time line improved health changes after a quit, withdrawal issues to expect from nicotine and habit, and ways to achieve the goal of a quit. Rate faded to 12 and will move smoking location due to she is unable to function where she is now due limited mobility. Is happy with progress. Will work on using strategies consistently. Refilled Chantix. The patient denies any abnormal behavioral or mental changes at this time.      Diagnosis: F17.210    Next Visit: 2 weeks

## 2017-10-01 ENCOUNTER — PATIENT MESSAGE (OUTPATIENT)
Dept: INTERNAL MEDICINE | Facility: CLINIC | Age: 67
End: 2017-10-01

## 2017-10-01 DIAGNOSIS — I10 HYPERTENSION, UNSPECIFIED TYPE: Primary | ICD-10-CM

## 2017-10-01 DIAGNOSIS — E78.5 HYPERLIPIDEMIA, UNSPECIFIED HYPERLIPIDEMIA TYPE: ICD-10-CM

## 2017-10-02 RX ORDER — ATORVASTATIN CALCIUM 40 MG/1
40 TABLET, FILM COATED ORAL DAILY
Qty: 90 TABLET | Refills: 1 | Status: SHIPPED | OUTPATIENT
Start: 2017-10-02 | End: 2018-04-12 | Stop reason: SDUPTHER

## 2017-10-02 RX ORDER — LISINOPRIL 40 MG/1
40 TABLET ORAL DAILY
Qty: 90 TABLET | Refills: 3 | Status: SHIPPED | OUTPATIENT
Start: 2017-10-02 | End: 2018-05-23

## 2017-10-03 ENCOUNTER — LAB VISIT (OUTPATIENT)
Dept: LAB | Facility: OTHER | Age: 67
End: 2017-10-03
Attending: INTERNAL MEDICINE
Payer: MEDICARE

## 2017-10-03 DIAGNOSIS — E83.42 HYPOMAGNESEMIA: ICD-10-CM

## 2017-10-03 DIAGNOSIS — E87.1 HYPONATREMIA: ICD-10-CM

## 2017-10-03 LAB
ANION GAP SERPL CALC-SCNC: 12 MMOL/L
BUN SERPL-MCNC: 22 MG/DL
CALCIUM SERPL-MCNC: 9.7 MG/DL
CHLORIDE SERPL-SCNC: 97 MMOL/L
CO2 SERPL-SCNC: 21 MMOL/L
CREAT SERPL-MCNC: 1.2 MG/DL
EST. GFR  (AFRICAN AMERICAN): 54 ML/MIN/1.73 M^2
EST. GFR  (NON AFRICAN AMERICAN): 47 ML/MIN/1.73 M^2
GLUCOSE SERPL-MCNC: 94 MG/DL
MAGNESIUM SERPL-MCNC: 1.7 MG/DL
POTASSIUM SERPL-SCNC: 4.7 MMOL/L
SODIUM SERPL-SCNC: 130 MMOL/L

## 2017-10-03 PROCEDURE — 83735 ASSAY OF MAGNESIUM: CPT

## 2017-10-03 PROCEDURE — 80048 BASIC METABOLIC PNL TOTAL CA: CPT

## 2017-10-03 PROCEDURE — 36415 COLL VENOUS BLD VENIPUNCTURE: CPT

## 2017-10-06 ENCOUNTER — PATIENT MESSAGE (OUTPATIENT)
Dept: INTERNAL MEDICINE | Facility: CLINIC | Age: 67
End: 2017-10-06

## 2017-10-06 RX ORDER — HYDROCODONE BITARTRATE AND ACETAMINOPHEN 10; 325 MG/1; MG/1
1 TABLET ORAL EVERY 12 HOURS PRN
Qty: 60 TABLET | Refills: 0 | Status: SHIPPED | OUTPATIENT
Start: 2017-10-06 | End: 2017-11-08 | Stop reason: SDUPTHER

## 2017-10-09 ENCOUNTER — CLINICAL SUPPORT (OUTPATIENT)
Dept: SMOKING CESSATION | Facility: CLINIC | Age: 67
End: 2017-10-09
Payer: COMMERCIAL

## 2017-10-09 DIAGNOSIS — F17.210 SMOKES 1/2 PACK/DAY OR LESS: Primary | ICD-10-CM

## 2017-10-09 PROCEDURE — 99407 BEHAV CHNG SMOKING > 10 MIN: CPT | Mod: S$GLB,,,

## 2017-10-24 ENCOUNTER — CLINICAL SUPPORT (OUTPATIENT)
Dept: SMOKING CESSATION | Facility: CLINIC | Age: 67
End: 2017-10-24
Payer: COMMERCIAL

## 2017-10-24 ENCOUNTER — OFFICE VISIT (OUTPATIENT)
Dept: INTERNAL MEDICINE | Facility: CLINIC | Age: 67
End: 2017-10-24
Attending: INTERNAL MEDICINE
Payer: MEDICARE

## 2017-10-24 VITALS
HEART RATE: 100 BPM | DIASTOLIC BLOOD PRESSURE: 68 MMHG | OXYGEN SATURATION: 97 % | BODY MASS INDEX: 35.96 KG/M2 | WEIGHT: 223.75 LBS | SYSTOLIC BLOOD PRESSURE: 130 MMHG | HEIGHT: 66 IN

## 2017-10-24 DIAGNOSIS — G89.28 CHRONIC PAIN FOLLOWING SURGERY OR PROCEDURE: ICD-10-CM

## 2017-10-24 DIAGNOSIS — M25.512 CHRONIC LEFT SHOULDER PAIN: ICD-10-CM

## 2017-10-24 DIAGNOSIS — E83.42 HYPOMAGNESEMIA: ICD-10-CM

## 2017-10-24 DIAGNOSIS — F17.210 SMOKES 1/2 PACK/DAY OR LESS: Primary | ICD-10-CM

## 2017-10-24 DIAGNOSIS — G89.29 CHRONIC LEFT SHOULDER PAIN: ICD-10-CM

## 2017-10-24 DIAGNOSIS — F17.210 MODERATE CIGARETTE SMOKER (10-19 PER DAY): ICD-10-CM

## 2017-10-24 DIAGNOSIS — I10 HYPERTENSION, BENIGN: Primary | ICD-10-CM

## 2017-10-24 DIAGNOSIS — F17.200 TOBACCO DEPENDENCE: ICD-10-CM

## 2017-10-24 DIAGNOSIS — J44.9 CHRONIC OBSTRUCTIVE PULMONARY DISEASE, UNSPECIFIED COPD TYPE: ICD-10-CM

## 2017-10-24 DIAGNOSIS — F11.20 UNCOMPLICATED OPIOID DEPENDENCE: ICD-10-CM

## 2017-10-24 DIAGNOSIS — M25.562 CHRONIC PAIN OF LEFT KNEE: ICD-10-CM

## 2017-10-24 DIAGNOSIS — G89.29 CHRONIC PAIN OF LEFT KNEE: ICD-10-CM

## 2017-10-24 DIAGNOSIS — M81.0 OSTEOPOROSIS, UNSPECIFIED OSTEOPOROSIS TYPE, UNSPECIFIED PATHOLOGICAL FRACTURE PRESENCE: ICD-10-CM

## 2017-10-24 DIAGNOSIS — R91.1 PULMONARY NODULE: ICD-10-CM

## 2017-10-24 PROCEDURE — 99403 PREV MED CNSL INDIV APPRX 45: CPT | Mod: S$GLB,,,

## 2017-10-24 PROCEDURE — 90662 IIV NO PRSV INCREASED AG IM: CPT | Mod: S$GLB,,, | Performed by: INTERNAL MEDICINE

## 2017-10-24 PROCEDURE — 99214 OFFICE O/P EST MOD 30 MIN: CPT | Mod: S$GLB,,, | Performed by: INTERNAL MEDICINE

## 2017-10-24 PROCEDURE — 99999 PR PBB SHADOW E&M-EST. PATIENT-LVL IV: CPT | Mod: PBBFAC,,, | Performed by: INTERNAL MEDICINE

## 2017-10-24 PROCEDURE — 99499 UNLISTED E&M SERVICE: CPT | Mod: S$PBB,,, | Performed by: INTERNAL MEDICINE

## 2017-10-24 PROCEDURE — G0008 ADMIN INFLUENZA VIRUS VAC: HCPCS | Mod: S$GLB,,, | Performed by: INTERNAL MEDICINE

## 2017-10-24 RX ORDER — VARENICLINE TARTRATE 1 MG/1
1 TABLET, FILM COATED ORAL 2 TIMES DAILY
Qty: 60 TABLET | Refills: 0 | Status: SHIPPED | OUTPATIENT
Start: 2017-10-24 | End: 2018-04-24 | Stop reason: ALTCHOICE

## 2017-10-24 NOTE — PROGRESS NOTES
Subjective:   Patient ID: Nalini Varma is a 66 y.o. female  Chief complaint:   Chief Complaint   Patient presents with    Hypertension     f/u    Shoulder Pain     and leg pain       HPI     Pt here for 3 month f/u for chronic pain mgmt. Taking norco twice daily. Reports only mildly helps with pain.   Seen by ortho at  on 12/20 still rec surgery but deferring for now - is planning on f/u with ortho in next 1-2 months.     Still smoking - is ready to quit and attending tob cess counseling     Seen by pulm for copd and abnormal screening ct with pulm nodules. Unable to repeat CT canceled due to inability to pay copay - 73 yo 100$ - however she reports that should be able to pay this in next 1-2months.     Taking 500mg mag oxide and mag level responded - has been consistent with mag dosing over past few months    Hyponatremia attributed to SIADH - eval by nephrology - balancing fluid restriction and renal function - no MS changes    Rushing today to get here - repeat bp improved on my exam after sitting    Taking chantix and no SE - but not very effective.     Hx of HLD - stable     Reports no taking alendronate consistently for porosis but agrees to start     Review of Systems   Constitutional: Negative for activity change and unexpected weight change.   HENT: Positive for hearing loss. Negative for rhinorrhea and trouble swallowing.    Eyes: Negative for discharge and visual disturbance.   Respiratory: Negative for chest tightness and wheezing.    Cardiovascular: Negative for chest pain and palpitations.   Gastrointestinal: Negative for blood in stool, constipation, diarrhea and vomiting.   Endocrine: Negative for polydipsia and polyuria.   Genitourinary: Negative for difficulty urinating, dysuria, hematuria and menstrual problem.   Musculoskeletal: Positive for arthralgias. Negative for joint swelling and neck pain.   Skin: Negative for color change and rash.   Neurological: Negative for weakness and  "headaches.   Psychiatric/Behavioral: Negative for confusion and dysphoric mood.     Objective:  Vitals:    10/24/17 1045 10/24/17 1132   BP: (!) 144/86 130/68   Pulse: 100    SpO2: 97%    Weight: 101.5 kg (223 lb 12.3 oz)    Height: 5' 6" (1.676 m)      Body mass index is 36.12 kg/m².    Physical Exam   Constitutional: She is oriented to person, place, and time. She appears well-developed and well-nourished.   HENT:   Head: Normocephalic and atraumatic.   Eyes: Conjunctivae and EOM are normal.   Neck: Normal range of motion. Neck supple.   Cardiovascular: Normal rate, regular rhythm and intact distal pulses.    Pulmonary/Chest: Effort normal and breath sounds normal.   Abdominal: Soft. Normal appearance and bowel sounds are normal.   Musculoskeletal: She exhibits edema and tenderness.   L > R knee with edema, no inc warmth or redness  +2 distal pulses  + ttp of at lateral and medial portion of knee above and below patella of left knee  + ttp of head of left biceps at bicipital groove   Neurological: She is alert and oriented to person, place, and time. She has normal strength. Gait normal.   Skin: Skin is warm, dry and intact. No cyanosis. Nails show no clubbing.   Psychiatric: She has a normal mood and affect. Her speech is normal and behavior is normal. Cognition and memory are normal.   Vitals reviewed.          Assessment:       Nalini was seen today for hyperlipidemia.    Diagnoses and all orders for this visit:    Pulmonary nodules: followed by pulm - due for repeat - unable to pay copay - referred to CM to see if assistance available     Tobacco dependence: counseled to quit - agrees to cont tob cess counseling    Chronic pain following surgery or procedure: under pain contract, cont norco bid prn - approp use discussed with pt - f/u with ortho to discuss possible surgery    Chronic obstructive pulmonary disease, unspecified COPD type: stable, cont inhalers, counseled to quit tob    Hypertension, benign: " controlled, cont meds    Hypomagnesemia: cont 500mg daily as nelson this dose - did not nleson higher dose due to diarrhea - mag improved    Osteoporosis, unspecified osteoporosis type, unspecified pathological fracture presence: rec otc supplement of calcium 1200mg and vit d 800u divided into 2 doses daily along with reg exercise  Cont alendronate    Uncomplicated opioid dependence: as above    Chronic left knee and shoulder pain: f/u with ortho as above - avoid nsaids due to hx of GIB 2/2 to nsaids and mild ckd - rec otc apap to not exceed 4g/24h    Health Maintenance   Topic Date Due    Influenza Vaccine  08/01/2017    Mammogram  03/21/2019    DEXA SCAN  08/23/2020    Lipid Panel  04/11/2022    TETANUS VACCINE  01/29/2025    Colonoscopy  05/22/2025    Hepatitis C Screening  Completed    Zoster Vaccine  Completed    Pneumococcal (65+)  Completed

## 2017-10-24 NOTE — PROGRESS NOTES
Individual Follow-Up Form    10/24/2017    Quit Date: TBD    Clinical Status of Patient: Outpatient    Length of Service: 45 minutes    Continuing Medication: yes  Chantix    Other Medications: none     Target Symptoms: Withdrawal and medication side effects. The following were  rated moderate (3) to severe (4) on TCRS:  · Moderate (3): desire, anxious  · Severe (4): none    Comments: Patient seen in clinic today. Reports she has decreased tobacco intake to 5 per day. Did have a day she smoked more than 5 due to anger. Discussed what strategies she can use to replace cigarette when angry. States she does not have self control and when she get mad she smokes so she does not lash out. Recognize it is habit to grab cigarettes and knows she need to find something else to replace them. Reviewed triggers, habits, and strategies. Will attempt smoking 3 cigarettes per day and is open to making changes to lifestyle to eliminate tobacco use. Remains reluctant setting quit date. Refilled Chantix.The patient denies any abnormal behavioral or mental changes at this time. The patient remains on the prescribed tobacco cessation medication regimen of 1 mg Chantix BID without any negative side effects at this time.     Diagnosis: F17.210    Next Visit: 2 weeks

## 2017-10-24 NOTE — Clinical Note
Patient smokes 5 per day. Rate faded to 3 per day along with using strategies consistently. Refilled Chantix. The patient denies any abnormal behavioral or mental changes at this time.

## 2017-10-24 NOTE — PROGRESS NOTES
"Patient was given vaccine information sheet for the Flu Vaccine. The area of injection was palpated using the acromion process as a landmark. This area was cleaned with alcohol. Using a 25g 1" safety needle, 0.5mL of the vaccine was placed into the left deltoid muscle. The injection site was dressed with a bandage. Patient experienced no complications and was discharged in stable condition. Fluzone High Dose vaccine Lot: [id number] Exp: [id number]    "

## 2017-11-07 ENCOUNTER — PATIENT MESSAGE (OUTPATIENT)
Dept: INTERNAL MEDICINE | Facility: CLINIC | Age: 67
End: 2017-11-07

## 2017-11-08 ENCOUNTER — PATIENT MESSAGE (OUTPATIENT)
Dept: INTERNAL MEDICINE | Facility: CLINIC | Age: 67
End: 2017-11-08

## 2017-11-08 DIAGNOSIS — J44.9 CHRONIC OBSTRUCTIVE PULMONARY DISEASE, UNSPECIFIED COPD TYPE: Primary | ICD-10-CM

## 2017-11-08 RX ORDER — HYDROCODONE BITARTRATE AND ACETAMINOPHEN 10; 325 MG/1; MG/1
1 TABLET ORAL EVERY 12 HOURS PRN
Qty: 60 TABLET | Refills: 0 | Status: SHIPPED | OUTPATIENT
Start: 2017-11-08 | End: 2017-12-07 | Stop reason: SDUPTHER

## 2017-11-09 RX ORDER — FLUTICASONE PROPIONATE AND SALMETEROL XINAFOATE 115; 21 UG/1; UG/1
2 AEROSOL, METERED RESPIRATORY (INHALATION) EVERY 12 HOURS
Qty: 36 G | Refills: 3 | Status: SHIPPED | OUTPATIENT
Start: 2017-11-09 | End: 2018-10-02 | Stop reason: SDUPTHER

## 2017-11-09 NOTE — TELEPHONE ENCOUNTER
CITLALI...Spoke to patient, she states that she has gotten quotes of over $200 at various pharmacies. She is unable to pay that amount. She states she will have to do without for a few months.

## 2017-11-09 NOTE — TELEPHONE ENCOUNTER
Sent refill but unsure if this will help issue as pt may need to pay out of pocket if insurance will not cover this due to discrepancy described in pt email. If she would like to switch to another pharmacy then please let me know and I can send her refill to another

## 2017-11-09 NOTE — TELEPHONE ENCOUNTER
Please notify her that I ordered referral to case management to see if there are any coupons or discounts available - she should hear from them in next few days

## 2017-11-13 ENCOUNTER — OUTPATIENT CASE MANAGEMENT (OUTPATIENT)
Dept: ADMINISTRATIVE | Facility: OTHER | Age: 67
End: 2017-11-13

## 2017-11-13 NOTE — PROGRESS NOTES
Please note the following patient's information has been forwarded to [facility] for case mgmt or  by Outpatient Case Management.    Please see the media section of patient's chart for additional details.    Please contact Ext. [phone] with any questions.    Thank you,    Kiara Emery, SSC

## 2017-12-07 ENCOUNTER — PATIENT MESSAGE (OUTPATIENT)
Dept: INTERNAL MEDICINE | Facility: CLINIC | Age: 67
End: 2017-12-07

## 2017-12-07 RX ORDER — HYDROCODONE BITARTRATE AND ACETAMINOPHEN 10; 325 MG/1; MG/1
1 TABLET ORAL EVERY 12 HOURS PRN
Qty: 60 TABLET | Refills: 0 | Status: SHIPPED | OUTPATIENT
Start: 2017-12-07 | End: 2018-01-06

## 2017-12-07 NOTE — TELEPHONE ENCOUNTER
Called pt and confirmed that RX has been sent to pharmacy .   Pt verbally understands and has no further questions

## 2018-01-07 ENCOUNTER — PATIENT MESSAGE (OUTPATIENT)
Dept: INTERNAL MEDICINE | Facility: CLINIC | Age: 68
End: 2018-01-07

## 2018-01-08 ENCOUNTER — TELEPHONE (OUTPATIENT)
Dept: INTERNAL MEDICINE | Facility: CLINIC | Age: 68
End: 2018-01-08

## 2018-01-08 RX ORDER — HYDROCODONE BITARTRATE AND ACETAMINOPHEN 10; 325 MG/1; MG/1
1 TABLET ORAL EVERY 12 HOURS PRN
Qty: 60 TABLET | Refills: 0 | Status: SHIPPED | OUTPATIENT
Start: 2018-01-08 | End: 2018-02-07 | Stop reason: SDUPTHER

## 2018-01-08 NOTE — TELEPHONE ENCOUNTER
Pt is under pain contract - please ask staff to show how to pull up past Rx - refill given. -  reviewed and consistent

## 2018-01-08 NOTE — TELEPHONE ENCOUNTER
This pt is requesting a refill on hydrocodone 10/325mg. I dont see it on her profile medications. Please advise and authorize

## 2018-01-24 ENCOUNTER — OFFICE VISIT (OUTPATIENT)
Dept: INTERNAL MEDICINE | Facility: CLINIC | Age: 68
End: 2018-01-24
Attending: INTERNAL MEDICINE
Payer: MEDICARE

## 2018-01-24 VITALS
SYSTOLIC BLOOD PRESSURE: 132 MMHG | OXYGEN SATURATION: 97 % | HEIGHT: 66 IN | DIASTOLIC BLOOD PRESSURE: 72 MMHG | WEIGHT: 235.25 LBS | BODY MASS INDEX: 37.81 KG/M2 | HEART RATE: 100 BPM

## 2018-01-24 DIAGNOSIS — K21.9 GASTROESOPHAGEAL REFLUX DISEASE WITHOUT ESOPHAGITIS: ICD-10-CM

## 2018-01-24 DIAGNOSIS — Z72.0 TOBACCO USE: ICD-10-CM

## 2018-01-24 DIAGNOSIS — I10 HYPERTENSION, BENIGN: ICD-10-CM

## 2018-01-24 DIAGNOSIS — R91.8 PULMONARY NODULES: Primary | ICD-10-CM

## 2018-01-24 DIAGNOSIS — M81.0 OSTEOPOROSIS, UNSPECIFIED OSTEOPOROSIS TYPE, UNSPECIFIED PATHOLOGICAL FRACTURE PRESENCE: ICD-10-CM

## 2018-01-24 DIAGNOSIS — M25.562 CHRONIC PAIN OF LEFT KNEE: ICD-10-CM

## 2018-01-24 DIAGNOSIS — E04.1 THYROID NODULE: ICD-10-CM

## 2018-01-24 DIAGNOSIS — F11.20 UNCOMPLICATED OPIOID DEPENDENCE: ICD-10-CM

## 2018-01-24 DIAGNOSIS — E55.9 VITAMIN D DEFICIENCY: ICD-10-CM

## 2018-01-24 DIAGNOSIS — J44.9 CHRONIC OBSTRUCTIVE PULMONARY DISEASE, UNSPECIFIED COPD TYPE: ICD-10-CM

## 2018-01-24 DIAGNOSIS — R91.1 PULMONARY NODULE: ICD-10-CM

## 2018-01-24 DIAGNOSIS — G89.28 CHRONIC PAIN FOLLOWING SURGERY OR PROCEDURE: ICD-10-CM

## 2018-01-24 DIAGNOSIS — E78.5 HYPERLIPIDEMIA, UNSPECIFIED HYPERLIPIDEMIA TYPE: ICD-10-CM

## 2018-01-24 DIAGNOSIS — G89.29 CHRONIC PAIN OF LEFT KNEE: ICD-10-CM

## 2018-01-24 PROCEDURE — 99999 PR PBB SHADOW E&M-EST. PATIENT-LVL IV: CPT | Mod: PBBFAC,,, | Performed by: INTERNAL MEDICINE

## 2018-01-24 PROCEDURE — 99214 OFFICE O/P EST MOD 30 MIN: CPT | Mod: S$GLB,,, | Performed by: INTERNAL MEDICINE

## 2018-01-24 RX ORDER — AMLODIPINE BESYLATE 10 MG/1
10 TABLET ORAL DAILY
Qty: 90 TABLET | Refills: 3 | Status: SHIPPED | OUTPATIENT
Start: 2018-01-24 | End: 2018-04-24

## 2018-01-24 RX ORDER — FAMOTIDINE 20 MG/1
20 TABLET, FILM COATED ORAL 2 TIMES DAILY
Qty: 180 TABLET | Refills: 3 | Status: SHIPPED | OUTPATIENT
Start: 2018-01-24 | End: 2018-02-23

## 2018-01-24 RX ORDER — ALENDRONATE SODIUM 70 MG/1
70 TABLET ORAL
Qty: 12 TABLET | Refills: 3 | Status: SHIPPED | OUTPATIENT
Start: 2018-01-24 | End: 2019-02-20

## 2018-01-24 NOTE — PROGRESS NOTES
Subjective:   Patient ID: Nalini Varma is a 67 y.o. female  Chief complaint:   Chief Complaint   Patient presents with    Hypertension       HPI  Pt here for HTN f/u:  Was rushing today and bp elevated - did improve at end of clinic appt.   Taking meds reg.   bp 130-140/80-90 at home with many > 140 systolic per pt.    Still smoking - had attended tob cess counseling in past. Reports not ready to quit at this time.   Hx of copd - nelson her meds - denies cp, sob, wheezing currently.     Pt here for 3 month f/u for chronic pain mgmt. Taking norco twice daily. Reports only mildly helps with pain to make it tolerable.   Was Seen by ortho Dr. Mendoza at  on 12/20 rec hold off on surgery per pt - he also dx her with left shoulder OA seen on xray  - pt may have to go through [facility] for [facility] surgery   - has f/u with him in March for update if [facility] ortho can perform surgery at [facility]    Seen by pulm for copd and abnormal screening ct with pulm nodules. Unable to repeat CT canceled due to inability to pay copay - 75 yo 100$ - however she reports that still unable to pay but agrees to inquire if copay still 75+ dollars and will schedule if able to afford copay    Taking 500mg mag oxide and mag level responded - has been consistent with mag dosing over past few months     Hyponatremia attributed to SIADH - eval by nephrology - balancing fluid restriction and renal function - no MS changes    Review of Systems   Constitutional: Negative for chills and fever.   HENT: Negative for congestion, rhinorrhea and sore throat.    Eyes: Negative for pain and visual disturbance.   Respiratory: Negative for cough, chest tightness, shortness of breath and wheezing.    Cardiovascular: Negative for chest pain and leg swelling.   Gastrointestinal: Negative for abdominal pain, constipation and diarrhea.   Genitourinary: Negative for dysuria and hematuria.   Musculoskeletal: Positive for neck pain. Negative for arthralgias and joint  "swelling.   Skin: Negative for color change and rash.   Neurological: Negative for dizziness and headaches.   Hematological: Negative for adenopathy. Does not bruise/bleed easily.   Psychiatric/Behavioral: Negative for sleep disturbance. The patient is not nervous/anxious.      Objective:  Vitals:    01/24/18 1026 01/24/18 1048   BP: (!) 160/74 132/72   Pulse: 100    SpO2: 97%    Weight: 106.7 kg (235 lb 3.7 oz)    Height: 5' 6" (1.676 m)      Body mass index is 37.97 kg/m².    Physical Exam   Constitutional: She is oriented to person, place, and time. She appears well-developed and well-nourished.   HENT:   Head: Normocephalic and atraumatic.   Eyes: Conjunctivae and EOM are normal.   Neck: Normal range of motion. Neck supple.   Cardiovascular: Normal rate, regular rhythm and intact distal pulses.    Pulmonary/Chest: Effort normal and breath sounds normal.   Abdominal: Soft. Normal appearance and bowel sounds are normal.   Musculoskeletal:   L > R knee with edema, no inc warmth or redness  +2 distal pulses  + ttp of at lateral and medial portion of knee above and below patella of left knee   Neurological: She is alert and oriented to person, place, and time. She has normal strength. Gait normal.   Skin: Skin is warm, dry and intact. No cyanosis. Nails show no clubbing.   Psychiatric: She has a normal mood and affect. Her speech is normal and behavior is normal. Cognition and memory are normal.   Vitals reviewed.    Assessment:  1. Pulmonary nodules    2. Tobacco use    3. Uncomplicated opioid dependence    4. Thyroid nodule s/p biopsy 2017 - benign    5. Pulmonary nodule    6. Osteoporosis, unspecified osteoporosis type, unspecified pathological fracture presence    7. Hypertension, benign    8. Chronic pain of left knee    9. Chronic pain following surgery or procedure    10. Chronic obstructive pulmonary disease, unspecified COPD type    11. Hyperlipidemia, unspecified hyperlipidemia type    12. Gastroesophageal " reflux disease without esophagitis    13. Vitamin D deficiency        Plan:  Nalini was seen today for hypertension.    Diagnoses and all orders for this visit:    Pulmonary nodules  -     CT Chest Without Contrast; Future    Tobacco use  -     CT Chest Without Contrast; Future  Counseled to quit!    Uncomplicated opioid dependence  Cont current med,  reviewed and consistent  Annual UDS utd    Thyroid nodule s/p biopsy 2017 - benign  Stable,     Pulmonary nodule  As above     Osteoporosis, unspecified osteoporosis type, unspecified pathological fracture presence  Cont niya/vit d suppl and alendronate  dexa utd    Hypertension, benign  -     Lipid panel; Future  -     CBC auto differential; Future  -     Comprehensive metabolic panel; Future  Cont meds and stop smoking!  Inc ccb to 10mg and rtc in 2 weeks for nv for bp check  If at goal then will rtc in 3 months for routine f/u     Chronic pain of left knee  As above   Keep appt with ortho    Chronic pain following surgery or procedure    Chronic obstructive pulmonary disease, unspecified COPD type    Hyperlipidemia, unspecified hyperlipidemia type  -     TSH; Future  -     Lipid panel; Future  Stable, cont diet modification and lipitor    Gastroesophageal reflux disease without esophagitis  -     famotidine (PEPCID) 20 MG tablet; Take 1 tablet (20 mg total) by mouth 2 (two) times daily.  Stable, cont h2 blocker and diet modification, avoid nsaids    Vitamin D deficiency  -     Vitamin D; Future  Cont vit d supp, check level    Other orders  -     alendronate (FOSAMAX) 70 MG tablet; Take 1 tablet (70 mg total) by mouth every 7 days. on Wednesday  -     amLODIPine (NORVASC) 10 MG tablet; Take 1 tablet (10 mg total) by mouth once daily.    Health Maintenance   Topic Date Due    Mammogram  03/21/2019    Colonoscopy  05/22/2020    DEXA SCAN  08/23/2020    Lipid Panel  04/11/2022    TETANUS VACCINE  01/29/2025    Hepatitis C Screening  Completed    Zoster Vaccine   Completed    Pneumococcal (65+)  Completed    Influenza Vaccine  Completed

## 2018-02-07 ENCOUNTER — PATIENT MESSAGE (OUTPATIENT)
Dept: INTERNAL MEDICINE | Facility: CLINIC | Age: 68
End: 2018-02-07

## 2018-02-07 RX ORDER — HYDROCODONE BITARTRATE AND ACETAMINOPHEN 10; 325 MG/1; MG/1
1 TABLET ORAL EVERY 12 HOURS PRN
Qty: 60 TABLET | Refills: 0 | Status: SHIPPED | OUTPATIENT
Start: 2018-02-07 | End: 2018-03-07 | Stop reason: SDUPTHER

## 2018-02-08 ENCOUNTER — HOSPITAL ENCOUNTER (OUTPATIENT)
Dept: RADIOLOGY | Facility: OTHER | Age: 68
Discharge: HOME | End: 2018-02-08
Attending: INTERNAL MEDICINE
Payer: MEDICARE

## 2018-02-08 ENCOUNTER — CLINICAL SUPPORT (OUTPATIENT)
Dept: INTERNAL MEDICINE | Facility: CLINIC | Age: 68
End: 2018-02-08
Payer: MEDICARE

## 2018-02-08 VITALS — SYSTOLIC BLOOD PRESSURE: 130 MMHG | DIASTOLIC BLOOD PRESSURE: 70 MMHG | OXYGEN SATURATION: 98 % | HEART RATE: 96 BPM

## 2018-02-08 DIAGNOSIS — Z72.0 TOBACCO USE: ICD-10-CM

## 2018-02-08 DIAGNOSIS — R91.8 PULMONARY NODULES: ICD-10-CM

## 2018-02-08 PROCEDURE — 71250 CT THORAX DX C-: CPT | Mod: 26,,, | Performed by: RADIOLOGY

## 2018-02-08 PROCEDURE — 71250 CT THORAX DX C-: CPT | Mod: TC

## 2018-02-08 PROCEDURE — 99999 PR PBB SHADOW E&M-EST. PATIENT-LVL II: CPT | Mod: PBBFAC,,,

## 2018-02-08 NOTE — PROGRESS NOTES
Nalini Varma 67 y.o. female is here today for Blood Pressure check.   History of HTN yes.    Review of patient's allergies indicates:   Allergen Reactions    Wellbutrin [bupropion hcl] Other (See Comments)     Hyponatremia and hypomagnesia     Zoloft [sertraline] Other (See Comments)     Hyponatremia and hypomagnesia      Creatinine   Date Value Ref Range Status   10/03/2017 1.2 0.5 - 1.4 mg/dL Final     Sodium   Date Value Ref Range Status   10/03/2017 130 (L) 136 - 145 mmol/L Final     Potassium   Date Value Ref Range Status   10/03/2017 4.7 3.5 - 5.1 mmol/L Final   ]  Patient verifies taking blood pressure medications on a regular bases at the same time of the day.     Current Outpatient Prescriptions:     albuterol 90 mcg/actuation inhaler, Inhale 2 puffs into the lungs every 6 (six) hours as needed for Wheezing., Disp: 6.7 g, Rfl: 11    alendronate (FOSAMAX) 70 MG tablet, Take 1 tablet (70 mg total) by mouth every 7 days. on Wednesday, Disp: 12 tablet, Rfl: 3    amLODIPine (NORVASC) 10 MG tablet, Take 1 tablet (10 mg total) by mouth once daily., Disp: 90 tablet, Rfl: 3    atorvastatin (LIPITOR) 40 MG tablet, Take 1 tablet (40 mg total) by mouth once daily., Disp: 90 tablet, Rfl: 1    calcium carbonate (CALTRATE 600) 600 mg (1,500 mg) Tab, Take 1 tablet (600 mg total) by mouth 2 (two) times daily with meals., Disp: , Rfl: 0    cyanocobalamin 1,000 mcg TbSR, Take 1,000 mcg by mouth once daily., Disp: 90 tablet, Rfl: 1    famotidine (PEPCID) 20 MG tablet, Take 1 tablet (20 mg total) by mouth 2 (two) times daily., Disp: 180 tablet, Rfl: 3    fluticasone-salmeterol (ADVAIR HFA) 115-21 mcg/actuation HFAA, Inhale 2 puffs into the lungs every 12 (twelve) hours., Disp: 36 g, Rfl: 3    hydrocodone-acetaminophen 10-325mg (NORCO)  mg Tab, Take 1 tablet by mouth every 12 (twelve) hours as needed for Pain., Disp: 60 tablet, Rfl: 0    lisinopril (PRINIVIL,ZESTRIL) 40 MG tablet, Take 1 tablet (40 mg  total) by mouth once daily., Disp: 90 tablet, Rfl: 3    magnesium oxide 400 mg Cap, Take 1 capsule by mouth once daily., Disp: , Rfl:     nicotine (NICODERM CQ) 21 mg/24 hr, Place 1 patch onto the skin once daily., Disp: 14 patch, Rfl: 0    umeclidinium 62.5 mcg/actuation DsDv, Inhale 1 puff into the lungs once daily. Controller, Disp: 90 each, Rfl: 3    varenicline (CHANTIX) 1 mg Tab, Take 1 tablet (1 mg total) by mouth 2 (two) times daily., Disp: 60 tablet, Rfl: 0    walker (ULTRA-LIGHT ROLLATOR) Misc, 1 each by Misc.(Non-Drug; Combo Route) route once daily., Disp: 1 each, Rfl: 0  Does patient have record of home blood pressure readings yes. Readings have been averaging 132/76.   Last dose of blood pressure medication was taken at 0815am.  Patient is asymptomatic.       BP: 130/70 , Pulse: 96.      Dr. Sahni notified.   Pt comes in for bp check. bp was within normal range

## 2018-02-12 DIAGNOSIS — R91.8 PULMONARY NODULES: Primary | ICD-10-CM

## 2018-03-07 ENCOUNTER — PATIENT MESSAGE (OUTPATIENT)
Dept: INTERNAL MEDICINE | Facility: CLINIC | Age: 68
End: 2018-03-07

## 2018-03-07 RX ORDER — HYDROCODONE BITARTRATE AND ACETAMINOPHEN 10; 325 MG/1; MG/1
1 TABLET ORAL EVERY 12 HOURS PRN
Qty: 60 TABLET | Refills: 0 | Status: SHIPPED | OUTPATIENT
Start: 2018-03-09 | End: 2018-04-05 | Stop reason: SDUPTHER

## 2018-04-05 ENCOUNTER — PATIENT MESSAGE (OUTPATIENT)
Dept: INTERNAL MEDICINE | Facility: CLINIC | Age: 68
End: 2018-04-05

## 2018-04-05 DIAGNOSIS — R52 PAIN AGGRAVATED BY ACTIVITIES OF DAILY LIVING: Primary | ICD-10-CM

## 2018-04-05 RX ORDER — HYDROCODONE BITARTRATE AND ACETAMINOPHEN 10; 325 MG/1; MG/1
1 TABLET ORAL EVERY 12 HOURS PRN
Qty: 60 TABLET | Refills: 0 | Status: SHIPPED | OUTPATIENT
Start: 2018-04-05 | End: 2018-05-05

## 2018-04-06 ENCOUNTER — TELEPHONE (OUTPATIENT)
Dept: INTERNAL MEDICINE | Facility: CLINIC | Age: 68
End: 2018-04-06

## 2018-04-12 DIAGNOSIS — E78.5 HYPERLIPIDEMIA, UNSPECIFIED HYPERLIPIDEMIA TYPE: ICD-10-CM

## 2018-04-12 RX ORDER — ATORVASTATIN CALCIUM 40 MG/1
TABLET, FILM COATED ORAL
Qty: 90 TABLET | Refills: 1 | Status: SHIPPED | OUTPATIENT
Start: 2018-04-12 | End: 2018-11-13 | Stop reason: SDUPTHER

## 2018-04-21 ENCOUNTER — HOSPITAL ENCOUNTER (EMERGENCY)
Facility: OTHER | Age: 68
Discharge: HOME | End: 2018-04-22
Attending: EMERGENCY MEDICINE
Payer: MEDICARE

## 2018-04-21 ENCOUNTER — NURSE TRIAGE (OUTPATIENT)
Dept: ADMINISTRATIVE | Facility: CLINIC | Age: 68
End: 2018-04-21

## 2018-04-21 VITALS
HEIGHT: 66 IN | RESPIRATION RATE: 20 BRPM | OXYGEN SATURATION: 96 % | SYSTOLIC BLOOD PRESSURE: 161 MMHG | TEMPERATURE: 98 F | HEART RATE: 92 BPM | BODY MASS INDEX: 35.36 KG/M2 | WEIGHT: 220 LBS | DIASTOLIC BLOOD PRESSURE: 103 MMHG

## 2018-04-21 DIAGNOSIS — R10.9 ABDOMINAL PAIN: ICD-10-CM

## 2018-04-21 LAB
ALBUMIN SERPL BCP-MCNC: 3.6 G/DL
ALP SERPL-CCNC: 74 U/L
ALT SERPL W/O P-5'-P-CCNC: 33 U/L
ANION GAP SERPL CALC-SCNC: 12 MMOL/L
AST SERPL-CCNC: 28 U/L
BASOPHILS # BLD AUTO: 0.01 K/UL
BASOPHILS NFR BLD: 0.1 %
BILIRUB SERPL-MCNC: 0.5 MG/DL
BILIRUB UR QL STRIP: NEGATIVE
BUN SERPL-MCNC: 15 MG/DL
CALCIUM SERPL-MCNC: 9.6 MG/DL
CHLORIDE SERPL-SCNC: 95 MMOL/L
CLARITY UR: CLEAR
CO2 SERPL-SCNC: 22 MMOL/L
COLOR UR: YELLOW
CREAT SERPL-MCNC: 0.9 MG/DL
DIFFERENTIAL METHOD: ABNORMAL
EOSINOPHIL # BLD AUTO: 0 K/UL
EOSINOPHIL NFR BLD: 0.4 %
ERYTHROCYTE [DISTWIDTH] IN BLOOD BY AUTOMATED COUNT: 14.1 %
EST. GFR  (AFRICAN AMERICAN): >60 ML/MIN/1.73 M^2
EST. GFR  (NON AFRICAN AMERICAN): >60 ML/MIN/1.73 M^2
GLUCOSE SERPL-MCNC: 114 MG/DL
GLUCOSE UR QL STRIP: NEGATIVE
HCT VFR BLD AUTO: 35 %
HGB BLD-MCNC: 11.6 G/DL
HGB UR QL STRIP: NEGATIVE
KETONES UR QL STRIP: NEGATIVE
LEUKOCYTE ESTERASE UR QL STRIP: NEGATIVE
LIPASE SERPL-CCNC: 28 U/L
LYMPHOCYTES # BLD AUTO: 1.4 K/UL
LYMPHOCYTES NFR BLD: 15.4 %
MAGNESIUM SERPL-MCNC: 1.4 MG/DL
MCH RBC QN AUTO: 30.6 PG
MCHC RBC AUTO-ENTMCNC: 33.1 G/DL
MCV RBC AUTO: 92 FL
MONOCYTES # BLD AUTO: 0.7 K/UL
MONOCYTES NFR BLD: 7.7 %
NEUTROPHILS # BLD AUTO: 6.9 K/UL
NEUTROPHILS NFR BLD: 76.2 %
NITRITE UR QL STRIP: NEGATIVE
PH UR STRIP: 7 [PH] (ref 5–8)
PLATELET # BLD AUTO: 224 K/UL
PMV BLD AUTO: 9.2 FL
POTASSIUM SERPL-SCNC: 4.5 MMOL/L
PROT SERPL-MCNC: 6.7 G/DL
PROT UR QL STRIP: ABNORMAL
RBC # BLD AUTO: 3.79 M/UL
SODIUM SERPL-SCNC: 129 MMOL/L
SP GR UR STRIP: 1.01 (ref 1–1.03)
TROPONIN I SERPL DL<=0.01 NG/ML-MCNC: <0.006 NG/ML
URN SPEC COLLECT METH UR: ABNORMAL
UROBILINOGEN UR STRIP-ACNC: NEGATIVE EU/DL
WBC # BLD AUTO: 9 K/UL

## 2018-04-21 PROCEDURE — 25000003 PHARM REV CODE 250: Performed by: EMERGENCY MEDICINE

## 2018-04-21 PROCEDURE — 85025 COMPLETE CBC W/AUTO DIFF WBC: CPT

## 2018-04-21 PROCEDURE — 81003 URINALYSIS AUTO W/O SCOPE: CPT

## 2018-04-21 PROCEDURE — 99285 EMERGENCY DEPT VISIT HI MDM: CPT | Mod: 25

## 2018-04-21 PROCEDURE — 96374 THER/PROPH/DIAG INJ IV PUSH: CPT

## 2018-04-21 PROCEDURE — 83735 ASSAY OF MAGNESIUM: CPT

## 2018-04-21 PROCEDURE — 83690 ASSAY OF LIPASE: CPT

## 2018-04-21 PROCEDURE — 63600175 PHARM REV CODE 636 W HCPCS: Performed by: EMERGENCY MEDICINE

## 2018-04-21 PROCEDURE — 96375 TX/PRO/DX INJ NEW DRUG ADDON: CPT

## 2018-04-21 PROCEDURE — 25500020 PHARM REV CODE 255: Performed by: EMERGENCY MEDICINE

## 2018-04-21 PROCEDURE — 93010 ELECTROCARDIOGRAM REPORT: CPT | Mod: ,,, | Performed by: INTERNAL MEDICINE

## 2018-04-21 PROCEDURE — 84484 ASSAY OF TROPONIN QUANT: CPT

## 2018-04-21 PROCEDURE — 96361 HYDRATE IV INFUSION ADD-ON: CPT

## 2018-04-21 PROCEDURE — 80053 COMPREHEN METABOLIC PANEL: CPT

## 2018-04-21 RX ORDER — KETOROLAC TROMETHAMINE 30 MG/ML
15 INJECTION, SOLUTION INTRAMUSCULAR; INTRAVENOUS
Status: COMPLETED | OUTPATIENT
Start: 2018-04-21 | End: 2018-04-21

## 2018-04-21 RX ORDER — ONDANSETRON 2 MG/ML
4 INJECTION INTRAMUSCULAR; INTRAVENOUS
Status: COMPLETED | OUTPATIENT
Start: 2018-04-21 | End: 2018-04-21

## 2018-04-21 RX ORDER — ONDANSETRON 4 MG/1
4 TABLET, ORALLY DISINTEGRATING ORAL EVERY 8 HOURS PRN
Qty: 15 TABLET | Refills: 0 | Status: SHIPPED | OUTPATIENT
Start: 2018-04-21 | End: 2018-07-06 | Stop reason: ALTCHOICE

## 2018-04-21 RX ADMIN — LIDOCAINE HYDROCHLORIDE: 20 SOLUTION ORAL; TOPICAL at 09:04

## 2018-04-21 RX ADMIN — KETOROLAC TROMETHAMINE 15 MG: 30 INJECTION, SOLUTION INTRAMUSCULAR at 06:04

## 2018-04-21 RX ADMIN — ONDANSETRON HYDROCHLORIDE 4 MG: 2 INJECTION, SOLUTION INTRAMUSCULAR; INTRAVENOUS at 06:04

## 2018-04-21 RX ADMIN — SODIUM CHLORIDE 1000 ML: 0.9 INJECTION, SOLUTION INTRAVENOUS at 06:04

## 2018-04-21 RX ADMIN — IOHEXOL 100 ML: 350 INJECTION, SOLUTION INTRAVENOUS at 07:04

## 2018-04-21 NOTE — TELEPHONE ENCOUNTER
Reason for Disposition   [1] Pain lasts > 10 minutes AND [2] age > 50     Mild chest pain as well.    Protocols used: ST ABDOMINAL PAIN - [facility]-

## 2018-04-21 NOTE — ED NOTES
Bed: [place] [address]  Expected date:   Expected time:   Means of arrival:   Comments:  67 F epigastric pains

## 2018-04-21 NOTE — ED NOTES
Patient presents to the Ed with a complaint of perfuse abdominal pain, started around 11am today, with associated symptoms of nausea, without vomiting, patient denies anything that makes her pain better. Patient states that pain I heavier in her RUQ, Patient still has her gallbladder and appendix. Patient denies any changes in bowel movements. Tenderness noted to the RUQ, bowel sounds active. Abdomen round and slightly distended. Respirations clear slightly more diminished on the right (patient is a daily smoker with COPD). Patient awake alert and oriented, received a spray of nitro by EMS with some zofran that she states has not helped her.

## 2018-04-21 NOTE — TELEPHONE ENCOUNTER
"  Answer Assessment - Initial Assessment Questions  1. LOCATION: "Where does it hurt?"       Upper abdominal upper breast   2. RADIATION: "Does the pain shoot anywhere else?" (e.g., chest, back)      To back   3. ONSET: "When did the pain begin?" (e.g., minutes, hours or days ago)       11a this morning   4. SUDDEN: "Gradual or sudden onset?"      Gradual   5. PATTERN "Does the pain come and go, or is it constant?"     - If constant: "Is it getting better, staying the same, or worsening?"       (Note: Constant means the pain never goes away completely; most serious pain is constant and it progresses)      - If intermittent: "How long does it last?" "Do you have pain now?"      (Note: Intermittent means the pain goes away completely between bouts)      constant  6. SEVERITY: "How bad is the pain?"  (e.g., Scale 1-10; mild, moderate, or severe)     - MILD (1-3): doesn't interfere with normal activities, abdomen soft and not tender to touch      - MODERATE (4-7): interferes with normal activities or awakens from sleep, tender to touch      - SEVERE (8-10): excruciating pain, doubled over, unable to do any normal activities        Moderate   7. RECURRENT SYMPTOM: "Have you ever had this type of abdominal pain before?" If so, ask: "When was the last time?" and "What happened that time?"       Yes, not this bad   8. AGGRAVATING FACTORS: "Does anything seem to cause this pain?" (e.g., foods, stress, alcohol)      Foods (melon)   9. CARDIAC SYMPTOMS: "Do you have any of the following symptoms: chest pain, difficulty breathing, sweating, nausea?"      Yes mild CP unrelieved with antiacids   10. OTHER SYMPTOMS: "Do you have any other symptoms?" (e.g., fever, vomiting, diarrhea)        No   11. PREGNANCY: "Is there any chance you are pregnant?" "When was your last menstrual period?"        --Age Consideration--    Protocols used: ST ABDOMINAL PAIN - [facility]-    "

## 2018-04-21 NOTE — ED PROVIDER NOTES
"Encounter Date: 4/21/2018    SCRIBE #1 NOTE: I, Pattie Mcelroy, am scribing for, and in the presence of, Dr. Denney.       History     Chief Complaint   Patient presents with    Abdominal Pain     epigastric and right-sided chest pain radiating to right upper back wtih nausea unrelieved from tums.      Time seen by provider: 6:16 PM    This is a 67 y.o. female, with history of COPD and HTN, who presents with complaint of worsening upper abdominal pain which started around 11AM this morning. Patient reports sudden onset of pain which is located diffusely across the upper abdomen and worse on the right side. She states that pain radiates into the right side of her chest ("under my ribs") and around to the right side of her back. She reports onset of nausea this morning prior to abdominal pain with associated decreased appetite. Patient states that symptoms have been unchanged by taking TUMs and drinking ginger ale. She denies ever experiencing similar pain. Patient reports waking up today at her baseline health. She denies eating spoiled food, though she says she consumed an egg that was possibly "old." She denies other household members with similar sx. The patient states that she experiences heartburn and epigastric discomfort intermittently but that current pain feels "totally different." She denies wheezing, vomiting, fever, chills, diarrhea, or constipation. Patient admits to current use of tobacco. She has no further complaints at this time.      The history is provided by the patient.     Review of patient's allergies indicates:   Allergen Reactions    Wellbutrin [bupropion hcl] Other (See Comments)     Hyponatremia and hypomagnesia     Zoloft [sertraline] Other (See Comments)     Hyponatremia and hypomagnesia      Past Medical History:   Diagnosis Date    Allergy     Anemia due to gastrointestinal blood loss     LESLY from gastric ulcer due to chronic nsaid use    Anxiety     Arthritis     CKD (chronic " kidney disease) stage 3, GFR 30-59 ml/min     followed by [facility] study - recieved labs from 2017 and 2/2018    Gastric ulcer     H/O knee surgery 2001    right    Hx of psychiatric care     Hyperlipidemia     Hypertension     Hyponatremia     Psychiatric problem     Therapy     Tobacco abuse      Past Surgical History:   Procedure Laterality Date    COLONOSCOPY  2015    ESOPHAGOGASTRODUODENOSCOPY  2015    FRACTURE SURGERY      left femur    JOINT REPLACEMENT  2007    left knee x 2, 2nd performed 2/2 to MRSA infection 3 months after 1st surgery    ORIF FEMUR FRACTURE Left     TUBAL LIGATION       Family History   Problem Relation Age of Onset    Hypertension Mother     Alzheimer's disease Mother     Dementia Mother     Depression Mother     Myasthenia gravis Father     Arthritis Father     Rectal cancer Father     Hypertension Brother     Arthritis Brother     Colon cancer Brother     No Known Problems Son     Cancer Sister      brain    Melanoma Neg Hx     Breast cancer Neg Hx      Social History   Substance Use Topics    Smoking status: Current Every Day Smoker     Packs/day: 1.50     Last attempt to quit: 2/3/1967    Smokeless tobacco: Never Used    Alcohol use Yes      Comment: socially, last had cocktails 2-3 weeks ago      Review of Systems   Constitutional: Positive for appetite change (decreased). Negative for chills and fever.   HENT: Negative for sore throat.    Respiratory: Negative for shortness of breath and wheezing.    Cardiovascular: Positive for chest pain.   Gastrointestinal: Positive for abdominal pain and nausea. Negative for constipation, diarrhea and vomiting.   Genitourinary: Negative for dysuria.   Musculoskeletal: Negative for back pain.   Skin: Negative for rash.   Neurological: Negative for weakness.   Hematological: Does not bruise/bleed easily.       Physical Exam     Initial Vitals [04/21/18 1731]   BP Pulse Resp Temp SpO2   (!) 154/84 82 20 97.5 °F  (36.4 °C) 97 %      MAP       107.33         Physical Exam    Nursing note and vitals reviewed.  Constitutional: She appears well-developed and well-nourished. She is not diaphoretic. No distress.   HENT:   Head: Normocephalic and atraumatic.   Mouth/Throat: No oropharyngeal exudate.   Eyes: EOM are normal. Pupils are equal, round, and reactive to light. Right eye exhibits no discharge. Left eye exhibits no discharge.   Neck: Normal range of motion. Neck supple.   Cardiovascular: Normal rate, regular rhythm and normal heart sounds.   No murmur heard.  Pulmonary/Chest: Breath sounds normal. No respiratory distress. She has no wheezes. She has no rhonchi. She has no rales.   Abdominal: Soft. She exhibits no distension. There is tenderness. There is no rebound and no guarding.   Diffuse upper abdominal tenderness to palpation, worse on the right side.   Musculoskeletal: Normal range of motion. She exhibits no edema or tenderness.   Neurological: She is alert and oriented to person, place, and time.   Skin: Skin is warm and dry. Capillary refill takes less than 2 seconds. No rash and no abscess noted. No erythema. No pallor.   Psychiatric: She has a normal mood and affect. Her behavior is normal. Judgment and thought content normal.         ED Course   Procedures  Labs Reviewed   CBC W/ AUTO DIFFERENTIAL - Abnormal; Notable for the following:        Result Value    RBC 3.79 (*)     Hemoglobin 11.6 (*)     Hematocrit 35.0 (*)     Gran% 76.2 (*)     Lymph% 15.4 (*)     All other components within normal limits   COMPREHENSIVE METABOLIC PANEL - Abnormal; Notable for the following:     Sodium 129 (*)     CO2 22 (*)     Glucose 114 (*)     All other components within normal limits   MAGNESIUM - Abnormal; Notable for the following:     Magnesium 1.4 (*)     All other components within normal limits   URINALYSIS - Abnormal; Notable for the following:     Protein, UA Trace (*)     All other components within normal limits    LIPASE   TROPONIN I     EKG Readings: (Independently Interpreted)   Initial Reading: No STEMI.   Normal sinus rhythm at a rate of 77 bpm. Poor baseline. Inverted T wave in V6. Unchanged from previous.     Imaging Results          US Abdomen Limited (Final result)  Result time 04/21/18 21:34:11    Final result by Harry Gamez MD (04/21/18 21:34:11)                 Impression:      Significantly distended gallbladder with biliary sludge.  No additional sonographic findings of acute cholecystitis.  Correlation with clinical findings is needed.    Mild dilation of the common bile duct measuring up to 8 mm in diameter, without obstructing stone or lesion identified sonographically or on recent CT.  Suggest correlation with serum bilirubin.    Hepatic steatosis.      Electronically signed by: Harry Gamez MD  Date:    04/21/2018  Time:    21:34             Narrative:    EXAMINATION:  US ABDOMEN LIMITED    CLINICAL HISTORY:  RUQ tenderness;    TECHNIQUE:  Limited ultrasound of the right upper quadrant of the abdomen (including pancreas, liver, gallbladder, common bile duct, and right kidney) was performed.    COMPARISON:  CT abdomen and pelvis, same day.    FINDINGS:  Liver: Normal in size, measuring 16.4 cm. Homogeneous hyperechoic echotexture.  No focal hepatic lesions.    Gallbladder: Gallbladder is significantly distended, measuring approximately 16 cm in length by 5 cm in diameter.  No gallbladder wall thickening.  No pericholecystic fluid.  Layering echogenic material is noted in the lumen, suggestive of sludge.  Sonographic Davila sign was negative.    Biliary system: The common duct is mildly dilated, measuring 8 mm.  No intrahepatic ductal dilatation.    Right kidney: Normal in size with no hydronephrosis, measuring 12.7 cm.  Simple appearing cyst noted in the superior pole measuring 2.8 cm.    Miscellaneous: No upper abdominal ascites.                               CT Abdomen Pelvis With Contrast  (Final result)  Result time 04/21/18 20:09:47    Final result by Harry Gamez MD (04/21/18 20:09:47)                 Impression:      Significantly distended gallbladder, but no wall thickening, pericholecystic fluid, or calcified gallstones.    Stable severe compression deformity involving the L3 vertebral body.    Mildly complex cyst in the superior pole of the right kidney, as seen on prior renal ultrasound in April 2017.    Colonic diverticulosis.    Probable hepatic steatosis.      Electronically signed by: Harry Gamez MD  Date:    04/21/2018  Time:    20:09             Narrative:    EXAMINATION:  CT ABDOMEN PELVIS WITH CONTRAST    CLINICAL HISTORY:  Abdominal pain, unspecified;Upper abdomen, R>L;    TECHNIQUE:  Low dose axial images, sagittal and coronal reformations were obtained from the lung bases to the pubic symphysis following the IV administration of 100 mL of Omnipaque 350 .  Oral contrast was not given. Postcontrast images were also obtained in the delayed phase.    COMPARISON:  CT chest without contrast, 02/08/2018 and 04/11/2017. Lumbar spine radiograph, 09/11/2017.  Renal ultrasound, 04/19/2017.    FINDINGS:  Lower Chest:    Lung bases are clear.  Heart size is normal.    Abdomen:    Liver is mildly enlarged and there is probable hepatic steatosis.  Gallbladder is distended, measuring up to 5.4 cm in diameter, but there is no evidence of wall thickening or pericholecystic fluid.  No calcified gallstones.  No intrahepatic biliary ductal dilatation.    Spleen, adrenals, and pancreas are unremarkable.    The kidneys are symmetric.  No hydronephrosis. Stable intermediate density lesion in the superior pole of the right kidney measuring up to 2.8 cm.  Cystic components were confirmed on prior renal ultrasound.  Multiple additional subcentimeter hypodensities in the right kidney which are too small to definitively characterize.    No small bowel obstruction.  No inflammatory changes identified  involving the gastrointestinal tract.  Multiple colonic diverticula in the descending and sigmoid colon.  Appendix is normal.    No pneumoperitoneum or organized fluid collection.    No bulky lymphadenopathy.    Abdominal aorta is normal in caliberwith moderate calcific atherosclerosis.    Portal, splenic, and superior mesenteric veins are patent.    Pelvis:    Urinary bladder, pelvic organs, and rectum are unremarkable.  No free fluid in the pelvis.  No pelvic lymphadenopathy.    Bones and soft tissues:    Bones are demineralized.  There is a severe compression deformity involving the L3 vertebral body with approximately 2-3 mm of fracture fragment retropulsion.  Findings are overall similar in appearance to the prior radiograph in 2017. Advanced degenerative changes in the lumbar spine.  Moderate degenerative changes involving the hips, left side greater than right.                                   Medical Decision Making:   Initial Assessment:       67F with COPD, HTN presents with upper abdominal pain R>L with nausea since 11-12am. No clear precipitant or contaminated food. She reports h/o GERD with different symptoms, and h/o bleeding ulcer with no pain then. No sign GI bleed now, no known h/o biliary colic. Mild diffuse upper abdominal tenderness on exam, afebrile and no acute abdomen. Pain radiates into chest, but no sign ACS on EKG, and no SOB or wheezing/COPD currently.  Concern for biliary colic, pancreatitis, gastritis, PUD.    Initial labs with no elevated WBC or LFTs. She does have chronic hyponatremia and hypomagnesia, within baseline range. She felt improvement with Zofran/Toradol. No elevated lipase and trop (-). CT abdomen done and shows distended gallbladder but no stones or sign of infection, and no other new findings. RUQ sono done to further evaluate with no sign acute cholecystitis. Possible gastritis or recurrent PUD, no anemia or sign of bleeding or perforated ulcer. She is already on Pepcid  BID (cannot take PPI due to hyponatremia), will add Zofran PRN and advise bland diet and GI f/u for further workup as indicated. She will return to ED for any worsening pain or other concerns.          Initial workup with   Independently Interpreted Test(s):   I have ordered and independently interpreted X-rays - see prior notes.  I have ordered and independently interpreted EKG Reading(s) - see prior notes  Clinical Tests:   Lab Tests: Reviewed and Ordered  Radiological Study: Reviewed and Ordered  Medical Tests: Reviewed and Ordered            Scribe Attestation:   Scribe #1: I performed the above scribed service and the documentation accurately describes the services I performed. I attest to the accuracy of the note.    Attending Attestation:           Physician Attestation for Scribe:  Physician Attestation Statement for Scribe #1: I, Dr. Denney, reviewed documentation, as scribed by Pattie Mcelroy in my presence, and it is both accurate and complete.                    Clinical Impression:     1. Abdominal pain                               Rhett Denney MD  04/22/18 0124

## 2018-04-23 ENCOUNTER — PES CALL (OUTPATIENT)
Dept: ADMINISTRATIVE | Facility: CLINIC | Age: 68
End: 2018-04-23

## 2018-04-24 ENCOUNTER — LAB VISIT (OUTPATIENT)
Dept: LAB | Facility: OTHER | Age: 68
End: 2018-04-24
Attending: INTERNAL MEDICINE
Payer: MEDICARE

## 2018-04-24 ENCOUNTER — OFFICE VISIT (OUTPATIENT)
Dept: INTERNAL MEDICINE | Facility: CLINIC | Age: 68
End: 2018-04-24
Attending: INTERNAL MEDICINE
Payer: MEDICARE

## 2018-04-24 VITALS
DIASTOLIC BLOOD PRESSURE: 54 MMHG | HEIGHT: 66 IN | WEIGHT: 208.31 LBS | OXYGEN SATURATION: 96 % | BODY MASS INDEX: 33.48 KG/M2 | SYSTOLIC BLOOD PRESSURE: 126 MMHG | HEART RATE: 107 BPM

## 2018-04-24 DIAGNOSIS — E78.5 HYPERLIPIDEMIA, UNSPECIFIED HYPERLIPIDEMIA TYPE: ICD-10-CM

## 2018-04-24 DIAGNOSIS — M81.0 OSTEOPOROSIS, UNSPECIFIED OSTEOPOROSIS TYPE, UNSPECIFIED PATHOLOGICAL FRACTURE PRESENCE: ICD-10-CM

## 2018-04-24 DIAGNOSIS — K57.30 DIVERTICULOSIS OF LARGE INTESTINE WITHOUT HEMORRHAGE: ICD-10-CM

## 2018-04-24 DIAGNOSIS — F17.200 TOBACCO DEPENDENCE: ICD-10-CM

## 2018-04-24 DIAGNOSIS — E55.9 VITAMIN D DEFICIENCY: ICD-10-CM

## 2018-04-24 DIAGNOSIS — R91.1 PULMONARY NODULE: ICD-10-CM

## 2018-04-24 DIAGNOSIS — J44.9 CHRONIC OBSTRUCTIVE PULMONARY DISEASE, UNSPECIFIED COPD TYPE: ICD-10-CM

## 2018-04-24 DIAGNOSIS — E04.1 THYROID NODULE: ICD-10-CM

## 2018-04-24 DIAGNOSIS — E87.1 HYPONATREMIA: ICD-10-CM

## 2018-04-24 DIAGNOSIS — I10 HYPERTENSION, BENIGN: Primary | ICD-10-CM

## 2018-04-24 DIAGNOSIS — E83.42 HYPOMAGNESEMIA: ICD-10-CM

## 2018-04-24 DIAGNOSIS — G89.4 CHRONIC PAIN SYNDROME: ICD-10-CM

## 2018-04-24 DIAGNOSIS — I10 HYPERTENSION, BENIGN: ICD-10-CM

## 2018-04-24 DIAGNOSIS — F11.20 UNCOMPLICATED OPIOID DEPENDENCE: ICD-10-CM

## 2018-04-24 LAB
25(OH)D3+25(OH)D2 SERPL-MCNC: 34 NG/ML
ALBUMIN SERPL BCP-MCNC: 2.9 G/DL
ALP SERPL-CCNC: 69 U/L
ALT SERPL W/O P-5'-P-CCNC: 16 U/L
ANION GAP SERPL CALC-SCNC: 11 MMOL/L
AST SERPL-CCNC: 11 U/L
BASOPHILS # BLD AUTO: 0.01 K/UL
BASOPHILS NFR BLD: 0.1 %
BILIRUB SERPL-MCNC: 0.9 MG/DL
BUN SERPL-MCNC: 18 MG/DL
CALCIUM SERPL-MCNC: 9.1 MG/DL
CHLORIDE SERPL-SCNC: 93 MMOL/L
CHOLEST SERPL-MCNC: 136 MG/DL
CHOLEST/HDLC SERPL: 1.8 {RATIO}
CO2 SERPL-SCNC: 21 MMOL/L
CREAT SERPL-MCNC: 1.1 MG/DL
DIFFERENTIAL METHOD: ABNORMAL
EOSINOPHIL # BLD AUTO: 0 K/UL
EOSINOPHIL NFR BLD: 0.1 %
ERYTHROCYTE [DISTWIDTH] IN BLOOD BY AUTOMATED COUNT: 13.7 %
EST. GFR  (AFRICAN AMERICAN): 60 ML/MIN/1.73 M^2
EST. GFR  (NON AFRICAN AMERICAN): 52 ML/MIN/1.73 M^2
GLUCOSE SERPL-MCNC: 109 MG/DL
HCT VFR BLD AUTO: 33.9 %
HDLC SERPL-MCNC: 74 MG/DL
HDLC SERPL: 54.4 %
HGB BLD-MCNC: 11.3 G/DL
LDLC SERPL CALC-MCNC: 49.2 MG/DL
LYMPHOCYTES # BLD AUTO: 1.3 K/UL
LYMPHOCYTES NFR BLD: 8.5 %
MCH RBC QN AUTO: 30.7 PG
MCHC RBC AUTO-ENTMCNC: 33.3 G/DL
MCV RBC AUTO: 92 FL
MONOCYTES # BLD AUTO: 1.6 K/UL
MONOCYTES NFR BLD: 10.5 %
NEUTROPHILS # BLD AUTO: 12.5 K/UL
NEUTROPHILS NFR BLD: 80.5 %
NONHDLC SERPL-MCNC: 62 MG/DL
PLATELET # BLD AUTO: 240 K/UL
PMV BLD AUTO: 9.9 FL
POTASSIUM SERPL-SCNC: 4.2 MMOL/L
PROT SERPL-MCNC: 6.5 G/DL
RBC # BLD AUTO: 3.68 M/UL
SODIUM SERPL-SCNC: 125 MMOL/L
TRIGL SERPL-MCNC: 64 MG/DL
TSH SERPL DL<=0.005 MIU/L-ACNC: 1.38 UIU/ML
WBC # BLD AUTO: 15.56 K/UL

## 2018-04-24 PROCEDURE — 80061 LIPID PANEL: CPT

## 2018-04-24 PROCEDURE — 36415 COLL VENOUS BLD VENIPUNCTURE: CPT

## 2018-04-24 PROCEDURE — 99214 OFFICE O/P EST MOD 30 MIN: CPT | Mod: S$GLB,,, | Performed by: INTERNAL MEDICINE

## 2018-04-24 PROCEDURE — 82306 VITAMIN D 25 HYDROXY: CPT

## 2018-04-24 PROCEDURE — 80053 COMPREHEN METABOLIC PANEL: CPT

## 2018-04-24 PROCEDURE — 84443 ASSAY THYROID STIM HORMONE: CPT

## 2018-04-24 PROCEDURE — 3074F SYST BP LT 130 MM HG: CPT | Mod: CPTII,S$GLB,, | Performed by: INTERNAL MEDICINE

## 2018-04-24 PROCEDURE — 3078F DIAST BP <80 MM HG: CPT | Mod: CPTII,S$GLB,, | Performed by: INTERNAL MEDICINE

## 2018-04-24 PROCEDURE — 85025 COMPLETE CBC W/AUTO DIFF WBC: CPT

## 2018-04-24 PROCEDURE — 99999 PR PBB SHADOW E&M-EST. PATIENT-LVL III: CPT | Mod: PBBFAC,,, | Performed by: INTERNAL MEDICINE

## 2018-04-24 NOTE — PROGRESS NOTES
"Subjective:   Patient ID: Nalini Varma is a 67 y.o. female  Chief complaint:   Chief Complaint   Patient presents with    Hypertension     f./u       HPI  Here for 3 month f/u of chronic pain mgmt and HTN  Also here for ER f/u - seen about 3 days ago - was having epigastric pain that has now resolved - pain started after ate breakfast - has been eating healthier  - she reports abd became distended   - no fevers or emesis   - had daily bm - brown - over past week - no blood in stool   - not taking ibuprofen or other nsaids   US neg for gallstone  CT reviewed and labs as well  UA neg for blood, lueks  nelson PO intake - ate bland diet at first and advancing diet and nelson this well  No cp.     Pt here for HTN f/u   Well controlled     COPD: stable  Hypomag: was not taking this over past few weeks - she did resume daily use of this after ER visit   Hepatic steatosis: seen on CT abd/pelvis    Pt here for 3 month f/u for chronic pain mgmt. Taking norco twice daily. Reports only mildly helps with pain to make it tolerable.   Was Seen by ortho Dr. Mendoza at  on 12/20 rec hold off on surgery per pt - he also dx her with left shoulder OA seen on xray  - pt may have to go through [facility] for knee surgery   She did not f/u with him in March as expected.   Checked  and consistent   tox screen due 8/2018     Tobacco abuse: Cut down on smoking - last cig was 4 days ago   - had attended tob cess counseling in past. Reports not ready to quit at this time  Copd/emphysema - nelson her meds - denies cp, sob, wheezing currently.     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    Physical Exam   Constitutional: She is oriented to person, place, and time. She appears well-developed and well-nourished.   HENT:   Head: Normocephalic and atraumatic.   Eyes: Conjunctivae and EOM are normal.   Neck: Normal range of motion. Neck supple.   Cardiovascular: Normal rate, regular rhythm and intact distal pulses.    Pulmonary/Chest: Effort normal and breath sounds normal.   Abdominal: Soft. Normal appearance and bowel sounds are normal.   Musculoskeletal:   L > R knee with edema, no inc warmth or redness  +2 distal pulses  + ttp of at lateral and medial portion of knee above and below patella of left knee    Neurological: She is alert and oriented to person, place, and time. She has normal strength. Gait normal.   Skin: Skin is warm, dry and intact. No cyanosis. Nails show no clubbing.   Psychiatric: She has a normal mood and affect. Her speech is normal and behavior is normal. Cognition and memory are normal.   Vitals reviewed.      Assessment:  1. Hypertension, benign    2. Diverticulosis of large intestine without hemorrhage    3. Uncomplicated opioid dependence    4. Tobacco dependence    5. Thyroid nodule s/p biopsy 2017 - benign    6. Pulmonary nodule    7. Osteoporosis, unspecified osteoporosis type, unspecified pathological fracture presence    8. Hyponatremia    9. Hypomagnesemia    10. Chronic obstructive pulmonary disease, unspecified COPD type    11. Chronic pain syndrome        Plan:  Nalini was seen today for hypertension.    Diagnoses and all orders for this visit:    Hypertension, benign  Controlled, cont meds    Diverticulosis of large intestine without hemorrhage  Stable     Uncomplicated opioid dependence  Cont current med,  reviewed and consistent, annual UDS utd    Tobacco dependence  Counseled to quit    Thyroid nodule s/p biopsy 2017 - benign  Stable    Pulmonary nodule  Stable     Osteoporosis, unspecified osteoporosis type,  unspecified pathological fracture presence  Cont meds     Hyponatremia  Stable    Hypomagnesemia  Resume oral mag as prev responded    Chronic obstructive pulmonary disease, unspecified COPD type  Stable, cont meds and quit tobacco    Chronic pain syndrome  As above  F/u with ortho    Health Maintenance   Topic Date Due    Mammogram  03/21/2019    Colonoscopy  05/22/2020    DEXA SCAN  08/23/2020    Lipid Panel  04/11/2022    TETANUS VACCINE  01/29/2025    Hepatitis C Screening  Completed    Zoster Vaccine  Completed    Pneumococcal (65+)  Completed    Influenza Vaccine  Completed

## 2018-04-25 ENCOUNTER — TELEPHONE (OUTPATIENT)
Dept: INTERNAL MEDICINE | Facility: CLINIC | Age: 68
End: 2018-04-25

## 2018-04-25 DIAGNOSIS — E87.1 HYPONATREMIA: ICD-10-CM

## 2018-04-25 DIAGNOSIS — D72.829 LEUKOCYTOSIS, UNSPECIFIED TYPE: Primary | ICD-10-CM

## 2018-04-26 ENCOUNTER — TELEPHONE (OUTPATIENT)
Dept: INTERNAL MEDICINE | Facility: CLINIC | Age: 68
End: 2018-04-26

## 2018-04-26 NOTE — TELEPHONE ENCOUNTER
I called pt and discussed lab results. She reports symptoms are resolving at this time. No nausea, vomiting, diarrhea, fevers or chills. abd pain subsiding and very mild when occurs - described as cramping at periumbilical area.     Will repeat cbc and bmp on Monday or Tuesday depending on transportation per pt  Reviewed plan to cont to advance diet and improve PO intake. Reviewed recs in message sent with lab results.   Er and rtc prompts reviewed. All questions were answered and pt verbalized understanding of plan.

## 2018-05-01 ENCOUNTER — LAB VISIT (OUTPATIENT)
Dept: LAB | Facility: OTHER | Age: 68
End: 2018-05-01
Attending: INTERNAL MEDICINE
Payer: MEDICARE

## 2018-05-01 DIAGNOSIS — D72.829 LEUKOCYTOSIS, UNSPECIFIED TYPE: ICD-10-CM

## 2018-05-01 DIAGNOSIS — E87.1 HYPONATREMIA: ICD-10-CM

## 2018-05-01 LAB
ALBUMIN SERPL BCP-MCNC: 3.2 G/DL
ALP SERPL-CCNC: 83 U/L
ALT SERPL W/O P-5'-P-CCNC: 29 U/L
ANION GAP SERPL CALC-SCNC: 12 MMOL/L
AST SERPL-CCNC: 18 U/L
BASOPHILS # BLD AUTO: 0.03 K/UL
BASOPHILS NFR BLD: 0.3 %
BILIRUB SERPL-MCNC: 0.5 MG/DL
BUN SERPL-MCNC: 18 MG/DL
CALCIUM SERPL-MCNC: 9.5 MG/DL
CHLORIDE SERPL-SCNC: 95 MMOL/L
CO2 SERPL-SCNC: 22 MMOL/L
CREAT SERPL-MCNC: 1.3 MG/DL
DIFFERENTIAL METHOD: ABNORMAL
EOSINOPHIL # BLD AUTO: 0.1 K/UL
EOSINOPHIL NFR BLD: 1 %
ERYTHROCYTE [DISTWIDTH] IN BLOOD BY AUTOMATED COUNT: 14 %
EST. GFR  (AFRICAN AMERICAN): 49 ML/MIN/1.73 M^2
EST. GFR  (NON AFRICAN AMERICAN): 42 ML/MIN/1.73 M^2
GLUCOSE SERPL-MCNC: 89 MG/DL
HCT VFR BLD AUTO: 34.8 %
HGB BLD-MCNC: 11.4 G/DL
LYMPHOCYTES # BLD AUTO: 2.1 K/UL
LYMPHOCYTES NFR BLD: 20.1 %
MCH RBC QN AUTO: 30.5 PG
MCHC RBC AUTO-ENTMCNC: 32.8 G/DL
MCV RBC AUTO: 93 FL
MONOCYTES # BLD AUTO: 0.9 K/UL
MONOCYTES NFR BLD: 8 %
NEUTROPHILS # BLD AUTO: 7.4 K/UL
NEUTROPHILS NFR BLD: 69.7 %
PLATELET # BLD AUTO: 521 K/UL
PMV BLD AUTO: 9.2 FL
POTASSIUM SERPL-SCNC: 5.4 MMOL/L
PROT SERPL-MCNC: 6.8 G/DL
RBC # BLD AUTO: 3.74 M/UL
SODIUM SERPL-SCNC: 129 MMOL/L
WBC # BLD AUTO: 10.63 K/UL

## 2018-05-01 PROCEDURE — 80053 COMPREHEN METABOLIC PANEL: CPT

## 2018-05-01 PROCEDURE — 85025 COMPLETE CBC W/AUTO DIFF WBC: CPT

## 2018-05-01 PROCEDURE — 36415 COLL VENOUS BLD VENIPUNCTURE: CPT

## 2018-05-03 ENCOUNTER — TELEPHONE (OUTPATIENT)
Dept: INTERNAL MEDICINE | Facility: CLINIC | Age: 68
End: 2018-05-03

## 2018-05-03 DIAGNOSIS — E87.5 HYPERKALEMIA: Primary | ICD-10-CM

## 2018-05-03 NOTE — TELEPHONE ENCOUNTER
Spoke w/ pt she states that she has read the message from PCP and statsd that she is not able to get to  to complete her lab.  The soonest she can get it done is Tuesday here at [facility]   I have pt BMP lab scheduled for 05/08/2018 at 9 am   is that appt okay ?    Please authorize and advise

## 2018-05-03 NOTE — TELEPHONE ENCOUNTER
Message sent to pt via my chart with lab results and updates to plan.     Please schedule bmp at main campus only in 1-2 days

## 2018-05-07 ENCOUNTER — PATIENT MESSAGE (OUTPATIENT)
Dept: INTERNAL MEDICINE | Facility: CLINIC | Age: 68
End: 2018-05-07

## 2018-05-07 RX ORDER — HYDROCODONE BITARTRATE AND ACETAMINOPHEN 10; 325 MG/1; MG/1
TABLET ORAL
COMMUNITY
End: 2018-05-07 | Stop reason: SDUPTHER

## 2018-05-07 RX ORDER — CIPROFLOXACIN HYDROCHLORIDE 3 MG/ML
SOLUTION/ DROPS OPHTHALMIC
COMMUNITY
End: 2018-07-06 | Stop reason: ALTCHOICE

## 2018-05-07 RX ORDER — HYDROCODONE BITARTRATE AND ACETAMINOPHEN 10; 325 MG/1; MG/1
1 TABLET ORAL EVERY 12 HOURS
Qty: 60 TABLET | Refills: 0 | Status: SHIPPED | OUTPATIENT
Start: 2018-05-07 | End: 2018-06-07 | Stop reason: SDUPTHER

## 2018-05-07 RX ORDER — HYDROCHLOROTHIAZIDE 25 MG/1
TABLET ORAL
COMMUNITY
End: 2018-05-14 | Stop reason: ALTCHOICE

## 2018-05-08 ENCOUNTER — LAB VISIT (OUTPATIENT)
Dept: LAB | Facility: OTHER | Age: 68
End: 2018-05-08
Attending: INTERNAL MEDICINE
Payer: MEDICARE

## 2018-05-08 DIAGNOSIS — E87.5 HYPERKALEMIA: ICD-10-CM

## 2018-05-08 LAB
ANION GAP SERPL CALC-SCNC: 11 MMOL/L
BUN SERPL-MCNC: 11 MG/DL
CALCIUM SERPL-MCNC: 9 MG/DL
CHLORIDE SERPL-SCNC: 97 MMOL/L
CO2 SERPL-SCNC: 20 MMOL/L
CREAT SERPL-MCNC: 1 MG/DL
EST. GFR  (AFRICAN AMERICAN): >60 ML/MIN/1.73 M^2
EST. GFR  (NON AFRICAN AMERICAN): 58 ML/MIN/1.73 M^2
GLUCOSE SERPL-MCNC: 93 MG/DL
POTASSIUM SERPL-SCNC: 5.6 MMOL/L
SODIUM SERPL-SCNC: 128 MMOL/L

## 2018-05-08 PROCEDURE — 80048 BASIC METABOLIC PNL TOTAL CA: CPT

## 2018-05-08 PROCEDURE — 36415 COLL VENOUS BLD VENIPUNCTURE: CPT

## 2018-05-10 ENCOUNTER — TELEPHONE (OUTPATIENT)
Dept: INTERNAL MEDICINE | Facility: CLINIC | Age: 68
End: 2018-05-10

## 2018-05-10 DIAGNOSIS — E87.5 HYPERKALEMIA: Primary | ICD-10-CM

## 2018-05-10 NOTE — TELEPHONE ENCOUNTER
Please call pt and notify her that potassium is still elevated - rec repeat bmp at main campus to see if this is persistent.   rec she repeat at that location as unclear if this is true elevation or due to transport of the specimen to  - if she can arrange transportation there then rec she do so (obtaining transportation to  has been an issue for her in the past)  Please repeat lab [place] or Monday   Stop any supplements that contain potassium   Avoid high potassium containing foods - bananas, oranges, apricots, leafy greens  Refer to renal for eval     Please inquire if she is taking hydrochlorothiazide - this was stopped the past due to hyponatremia but is now on med list again   - will remove this if she is no longer taking  - will plan to hold lisinopril and switch amlodipine to nifedipine and consider add bb if needed to control htn if potassium still elevated after checked at

## 2018-05-10 NOTE — TELEPHONE ENCOUNTER
Spoke with pt and explained that K+ was high and discussed that the food that was high in K+. Scheduled lab draw for Tuesday at 1045 her at [facility]. We discussed the  Possibility of changing bp meds. Pt verbalized understanding

## 2018-05-14 ENCOUNTER — TELEPHONE (OUTPATIENT)
Dept: INTERNAL MEDICINE | Facility: CLINIC | Age: 68
End: 2018-05-14

## 2018-05-14 ENCOUNTER — CLINICAL SUPPORT (OUTPATIENT)
Dept: SMOKING CESSATION | Facility: CLINIC | Age: 68
End: 2018-05-14
Payer: COMMERCIAL

## 2018-05-14 DIAGNOSIS — F17.200 NICOTINE DEPENDENCE: Primary | ICD-10-CM

## 2018-05-14 PROCEDURE — 99407 BEHAV CHNG SMOKING > 10 MIN: CPT | Mod: S$GLB,,, | Performed by: INTERNAL MEDICINE

## 2018-05-14 NOTE — PROGRESS NOTES
"Spoke with patient today in regard to smoking cessation progress for a 3/6 month follow up, she states not tobacco free. Patient states "not ready to return to the program at this time have a lot going on". Informed patient of benefit period, a future follow up at 1 year, and contact information if any further help or support is needed. Will complete smart form for 3/6 month follow up on Quit attempt #1.    "

## 2018-05-15 ENCOUNTER — LAB VISIT (OUTPATIENT)
Dept: LAB | Facility: OTHER | Age: 68
End: 2018-05-15
Attending: INTERNAL MEDICINE
Payer: MEDICARE

## 2018-05-15 DIAGNOSIS — E87.5 HYPERKALEMIA: Primary | ICD-10-CM

## 2018-05-15 DIAGNOSIS — E87.5 HYPERKALEMIA: ICD-10-CM

## 2018-05-15 LAB
ANION GAP SERPL CALC-SCNC: 13 MMOL/L
BUN SERPL-MCNC: 17 MG/DL
CALCIUM SERPL-MCNC: 9.7 MG/DL
CHLORIDE SERPL-SCNC: 100 MMOL/L
CO2 SERPL-SCNC: 24 MMOL/L
CREAT SERPL-MCNC: 1.2 MG/DL
EST. GFR  (AFRICAN AMERICAN): 54 ML/MIN/1.73 M^2
EST. GFR  (NON AFRICAN AMERICAN): 47 ML/MIN/1.73 M^2
GLUCOSE SERPL-MCNC: 97 MG/DL
POTASSIUM SERPL-SCNC: 6.1 MMOL/L
SODIUM SERPL-SCNC: 137 MMOL/L

## 2018-05-15 PROCEDURE — 80048 BASIC METABOLIC PNL TOTAL CA: CPT

## 2018-05-15 PROCEDURE — 36415 COLL VENOUS BLD VENIPUNCTURE: CPT

## 2018-05-15 RX ORDER — ATENOLOL 50 MG/1
50 TABLET ORAL DAILY
Qty: 30 TABLET | Refills: 1 | Status: SHIPPED | OUTPATIENT
Start: 2018-05-15 | End: 2018-07-06 | Stop reason: SDUPTHER

## 2018-05-15 NOTE — TELEPHONE ENCOUNTER
Please notify pt that her potassium level is still elevated    rec she f/u with renal asap in 1 week - please call dept to assist with arranging appt if needed   rec she start medication called kayexalate daily to help her body reduce the amt of potassium - please warn her that this Rx will cause her to have loose BM to help her body remove potassium     rec she continue to avoid all potassium supplements and limit high potassium foods as previously discussed  If she develops weakness, palpitations, sob or cp then go to ER     Please arrange repeat potassium level for this Friday  rec stop lisinopril for now and start atenolol for bp mgmt until potassium returns to normal range    Do not close encounter until pt is reached

## 2018-05-16 RX ORDER — PREDNISONE 20 MG/1
TABLET ORAL
COMMUNITY
End: 2018-07-06 | Stop reason: ALTCHOICE

## 2018-05-16 RX ORDER — PANTOPRAZOLE SODIUM 20 MG/1
TABLET, DELAYED RELEASE ORAL
COMMUNITY
End: 2018-07-06 | Stop reason: ALTCHOICE

## 2018-05-16 RX ORDER — ATENOLOL 50 MG/1
50 TABLET ORAL DAILY
Qty: 30 TABLET | Refills: 1 | OUTPATIENT
Start: 2018-05-16 | End: 2019-05-16

## 2018-05-16 RX ORDER — PANTOPRAZOLE SODIUM 40 MG/1
TABLET, DELAYED RELEASE ORAL
COMMUNITY
End: 2018-07-06 | Stop reason: ALTCHOICE

## 2018-05-16 RX ORDER — VARENICLINE TARTRATE 1 MG/1
TABLET, FILM COATED ORAL
COMMUNITY
End: 2018-07-13

## 2018-05-16 RX ORDER — BUPROPION HYDROCHLORIDE 150 MG/1
TABLET ORAL
COMMUNITY
End: 2018-07-06 | Stop reason: ALTCHOICE

## 2018-05-16 RX ORDER — KETOROLAC TROMETHAMINE 5 MG/ML
SOLUTION OPHTHALMIC
Status: ON HOLD | COMMUNITY
End: 2018-07-16 | Stop reason: CLARIF

## 2018-05-16 RX ORDER — NITROFURANTOIN 25; 75 MG/1; MG/1
CAPSULE ORAL
COMMUNITY
End: 2018-07-06 | Stop reason: ALTCHOICE

## 2018-05-16 RX ORDER — AZITHROMYCIN 250 MG/1
TABLET, FILM COATED ORAL
COMMUNITY
End: 2018-07-06 | Stop reason: ALTCHOICE

## 2018-05-16 RX ORDER — OXYCODONE AND ACETAMINOPHEN 10; 325 MG/1; MG/1
TABLET ORAL
COMMUNITY
End: 2018-06-07 | Stop reason: ALTCHOICE

## 2018-05-16 RX ORDER — BUPROPION HYDROCHLORIDE 150 MG/1
TABLET, EXTENDED RELEASE ORAL
COMMUNITY
End: 2018-07-06 | Stop reason: ALTCHOICE

## 2018-05-16 RX ORDER — PREDNISOLONE ACETATE 10 MG/ML
SUSPENSION/ DROPS OPHTHALMIC
COMMUNITY
End: 2018-07-06 | Stop reason: ALTCHOICE

## 2018-05-16 NOTE — TELEPHONE ENCOUNTER
----- Message from Ericka Austin sent at 5/16/2018  8:35 AM CDT -----  Contact: self            Name of Who is Calling: ELIEZER RODRIGUEZ [[id number]]    What is the request in detail:pt states she is having trouble scheduling nephrology appt.. please advise      Can the clinic reply by [facility]: no      What Number [facility] Back [facility]: [phone]

## 2018-05-16 NOTE — TELEPHONE ENCOUNTER
Spoke w/ pt and informed her of PCP advice and recommendations   Pt verbally understands and agree to  RX today and scheduled her potassium labs along w/ appt to see nephrology .  Informed pt ot stop taking lisinopril and cont taking atenolol .   Pt agrees and states that she needs an refill on her atenolol.   Rx pending   Please authorize and advise

## 2018-05-21 ENCOUNTER — LAB VISIT (OUTPATIENT)
Dept: LAB | Facility: OTHER | Age: 68
End: 2018-05-21
Attending: INTERNAL MEDICINE
Payer: MEDICARE

## 2018-05-21 DIAGNOSIS — E87.5 HYPERKALEMIA: ICD-10-CM

## 2018-05-21 LAB — POTASSIUM SERPL-SCNC: 4.9 MMOL/L

## 2018-05-21 PROCEDURE — 84132 ASSAY OF SERUM POTASSIUM: CPT

## 2018-05-21 PROCEDURE — 36415 COLL VENOUS BLD VENIPUNCTURE: CPT

## 2018-05-22 ENCOUNTER — PATIENT MESSAGE (OUTPATIENT)
Dept: INTERNAL MEDICINE | Facility: CLINIC | Age: 68
End: 2018-05-22

## 2018-05-23 ENCOUNTER — OFFICE VISIT (OUTPATIENT)
Dept: NEPHROLOGY | Facility: CLINIC | Age: 68
End: 2018-05-23
Payer: MEDICARE

## 2018-05-23 VITALS
HEIGHT: 66 IN | DIASTOLIC BLOOD PRESSURE: 70 MMHG | HEART RATE: 77 BPM | OXYGEN SATURATION: 95 % | BODY MASS INDEX: 37.77 KG/M2 | WEIGHT: 235 LBS | SYSTOLIC BLOOD PRESSURE: 120 MMHG

## 2018-05-23 DIAGNOSIS — R60.9 EDEMA, UNSPECIFIED TYPE: ICD-10-CM

## 2018-05-23 DIAGNOSIS — E87.5 HYPERKALEMIA: ICD-10-CM

## 2018-05-23 DIAGNOSIS — E87.1 HYPONATREMIA: Primary | ICD-10-CM

## 2018-05-23 PROCEDURE — 3078F DIAST BP <80 MM HG: CPT | Mod: CPTII,S$GLB,, | Performed by: INTERNAL MEDICINE

## 2018-05-23 PROCEDURE — 3074F SYST BP LT 130 MM HG: CPT | Mod: CPTII,S$GLB,, | Performed by: INTERNAL MEDICINE

## 2018-05-23 PROCEDURE — 99213 OFFICE O/P EST LOW 20 MIN: CPT | Mod: S$GLB,,, | Performed by: INTERNAL MEDICINE

## 2018-05-23 PROCEDURE — 99999 PR PBB SHADOW E&M-EST. PATIENT-LVL II: CPT | Mod: PBBFAC,,, | Performed by: INTERNAL MEDICINE

## 2018-05-23 RX ORDER — HYDROCODONE BITARTRATE AND ACETAMINOPHEN 7.5; 325 MG/1; MG/1
TABLET ORAL
COMMUNITY
End: 2018-06-07 | Stop reason: CLARIF

## 2018-05-23 RX ORDER — ALBUTEROL SULFATE 90 UG/1
AEROSOL, METERED RESPIRATORY (INHALATION)
COMMUNITY
End: 2018-07-06 | Stop reason: ALTCHOICE

## 2018-05-23 RX ORDER — ATORVASTATIN CALCIUM 40 MG/1
TABLET, FILM COATED ORAL
COMMUNITY
End: 2018-07-06 | Stop reason: ALTCHOICE

## 2018-05-23 RX ORDER — ALENDRONATE SODIUM 70 MG/1
TABLET ORAL
COMMUNITY
End: 2018-07-06 | Stop reason: ALTCHOICE

## 2018-05-23 RX ORDER — FLUTICASONE PROPIONATE AND SALMETEROL XINAFOATE 115; 21 UG/1; UG/1
AEROSOL, METERED RESPIRATORY (INHALATION)
COMMUNITY
End: 2018-07-06 | Stop reason: ALTCHOICE

## 2018-05-23 RX ORDER — AMLODIPINE BESYLATE 5 MG/1
TABLET ORAL
COMMUNITY
End: 2018-07-06 | Stop reason: ALTCHOICE

## 2018-05-23 RX ORDER — TORSEMIDE 5 MG/1
5 TABLET ORAL DAILY
Qty: 90 TABLET | Refills: 3 | Status: SHIPPED | OUTPATIENT
Start: 2018-05-23 | End: 2018-07-31 | Stop reason: SDUPTHER

## 2018-05-23 RX ORDER — LISINOPRIL 10 MG/1
TABLET ORAL
COMMUNITY
End: 2018-05-23

## 2018-05-23 RX ORDER — HYDROCODONE BITARTRATE AND ACETAMINOPHEN 5; 325 MG/1; MG/1
TABLET ORAL
COMMUNITY
End: 2018-06-07 | Stop reason: ALTCHOICE

## 2018-05-23 RX ORDER — FAMOTIDINE 20 MG/1
TABLET, FILM COATED ORAL
COMMUNITY
End: 2018-07-06 | Stop reason: ALTCHOICE

## 2018-05-23 NOTE — LETTER
May 23, 2018      Yane Sahni MD  [address]  [facility] [address]           [facility] - Nephrology  [address]  [facility] [address]  Phone: [phone]  Fax: [phone]          Patient: Nalini Varma   MR Number: [id number]   YOB: 1950   Date of Visit: 5/23/2018       Dear Dr. Yane Sahni:    Thank you for referring Nalini Varma to me for evaluation. Attached you will find relevant portions of my assessment and plan of care.    If you have questions, please do not hesitate to call me. I look forward to following Nalini Varma along with you.    Sincerely,    Timothy Hernández MD    Enclosure  CC:  No Recipients    If you would like to receive this communication electronically, please contact [email or web address] or [phone] to request more information on [email or web address] Link access.    For providers and/or their staff who would like to refer a patient to [facility], please contact us through our one-stop-shop provider referral line, [facility], at [phone].    If you feel you have received this communication in error or would no longer like to receive these types of communications, please e-mail [email or web address]

## 2018-05-23 NOTE — PROGRESS NOTES
"NEPHROLOGY PROGRESS NOTE    Subjective:       Patient ID: Nalini Varma is a 67 y.o. White female who presents for follow up evaluation of hyponatremia. Previously sen by Dr. Gee. This is the first time that I am seeing this patient.     From Dr. Gee:  "The patient has a history HTN x 2-3 yrs. She was recently admitted to [facility] with dizziness and found to have acute on chronic to hyponatremia. Her hyponatremia is evident since 12/16  But also had borderline low sodium since 2/16 (135 meq/l). In December of 2016 she was on HCTZ and Zoloft but she is not taking it anymore. Reports adhering to fluid restriction around 1 liter at this time. Last serum Na down was 130 at discharge from [facility]. Unfortunately the patient is homeless and lives in a shelter at this time (20 month).  The patient does not frequently use NSAIDS or herbal supplements. Livelong smoker (40 yrs)."    HPI   Arrives for follow up. Her serum K was elevated and it corrected after stopping lisinopril and taking SPS. Trying to eat less and drinking less water.     Review of Systems   Constitutional: Negative for activity change, appetite change, chills, fatigue, fever and unexpected weight change.   HENT: Negative.  Negative for nosebleeds.    Eyes: Negative.    Respiratory: Positive for shortness of breath (stable with walking). Negative for cough and chest tightness.    Cardiovascular: Negative.  Negative for chest pain and leg swelling.   Gastrointestinal: Negative for abdominal pain, anal bleeding, diarrhea, nausea and vomiting.   Genitourinary: Negative for decreased urine volume, difficulty urinating, dysuria, flank pain, frequency, hematuria, pelvic pain, urgency and vaginal bleeding.   Musculoskeletal: Negative.  Negative for joint swelling and myalgias.   Skin: Negative.  Negative for rash.   Neurological: Negative.    Psychiatric/Behavioral: Negative.    All other systems reviewed and are negative.      Objective:   /70 " mmHg  Physical Exam   Constitutional: She is oriented to person, place, and time. She appears well-developed and well-nourished. No distress.   HENT:   Head: Normocephalic and atraumatic.   Right Ear: External ear normal.   Left Ear: External ear normal.   Nose: Nose normal.   Mouth/Throat: Oropharynx is clear and moist.   Eyes: Conjunctivae and EOM are normal. Pupils are equal, round, and reactive to light.   Neck: Normal range of motion. Neck supple. No thyromegaly present.   Cardiovascular: Normal rate, regular rhythm and intact distal pulses.    Murmur (systolic) heard.  Pulmonary/Chest: Effort normal and breath sounds normal. No respiratory distress. She has no wheezes. She has no rales. She exhibits no tenderness.   Abdominal: Soft. Normal appearance and bowel sounds are normal. She exhibits no distension. There is no tenderness. There is no rebound and no guarding.   Musculoskeletal: She exhibits edema (2+ bilateral). She exhibits no tenderness.   Neurological: She is alert and oriented to person, place, and time. She has normal reflexes.   Skin: Skin is warm, dry and intact. She is not diaphoretic.   Nails of large toes missing   Psychiatric: She has a normal mood and affect. Her behavior is normal. Judgment and thought content normal.   Nursing note and vitals reviewed.      Assessment:       1. Hyponatremia    2. Hyperkalemia    3. Edema, unspecified type        Plan:       Hyponatremia since 2015 chronic Hyponatremia on and off since 2/16.   Her labs in the past with the elevated urinary sodium and osmolality in the light of hypontremia point towards SIADH. Wellbutrin and HCTZ could have played a role in the past.  Continue with fluid restriction 1L/day.  Level WNL now. Will continue monitoring it.     Hyperkalemia: improved off ACEI, no compelling reason to restart ACEI. BP well controlled with atenolol. Continue low K diet.     Edema: reportedly newly developed over 1 month. Will obtain Echocardiogram,  no proteinuria. Could be deep venous insufficiency. Will start a loop diuretic.     RTC in 3 mo with Dr. Gee

## 2018-06-06 ENCOUNTER — PATIENT MESSAGE (OUTPATIENT)
Dept: INTERNAL MEDICINE | Facility: CLINIC | Age: 68
End: 2018-06-06

## 2018-06-07 RX ORDER — HYDROCODONE BITARTRATE AND ACETAMINOPHEN 10; 325 MG/1; MG/1
1 TABLET ORAL EVERY 12 HOURS
Qty: 60 TABLET | Refills: 0 | Status: SHIPPED | OUTPATIENT
Start: 2018-06-07 | End: 2018-07-06 | Stop reason: SDUPTHER

## 2018-06-07 RX ORDER — OXYCODONE AND ACETAMINOPHEN 10; 325 MG/1; MG/1
1 TABLET ORAL EVERY 12 HOURS PRN
Qty: 60 TABLET | Refills: 0 | Status: CANCELLED | OUTPATIENT
Start: 2018-06-07

## 2018-07-03 ENCOUNTER — TELEPHONE (OUTPATIENT)
Dept: INTERNAL MEDICINE | Facility: CLINIC | Age: 68
End: 2018-07-03

## 2018-07-03 NOTE — TELEPHONE ENCOUNTER
----- Message from Sowmya Bassett sent at 7/3/2018 11:19 AM CDT -----  Name of Who is Calling: ELIEZER RODRIGUEZ [[id number]]      What is the request in detail: Pt states the appt on 7/6 does not work for her. Please contact to get a sooner appt. Offered today pt declined.       Can the clinic reply by [facility]:   No       What Number to Call Back [facility]: [phone]

## 2018-07-03 NOTE — TELEPHONE ENCOUNTER
Spoke w/ pt and scheduled her an appt to see PCP regarding swelling   Pt verbally agree to appt time and date and has no further questions   She stated if her transportation is not able to bring her she will contact us to reschedule

## 2018-07-03 NOTE — TELEPHONE ENCOUNTER
----- Message from Lilia Alvarezman sent at 7/3/2018 10:35 AM CDT -----  Contact: ELIEZER RODRIGUEZ [[id number]]            Name of Who is Calling: ELIEZER RODRIGUEZ [[id number]]      What is the request in detail: Pt says that she's having swelling in both legs and would like to be seen on next week.  Pt was offered to be scheduled with a different doctor or be seen to day with Dr. Sahni and declined the offer.  Please contact pt to further discuss and advise.    Can the clinic reply by [facility]: No      What Number to Call Back [facility] not in [facility]: [phone]

## 2018-07-03 NOTE — TELEPHONE ENCOUNTER
----- Message from Justin Perry sent at 7/3/2018 11:54 AM CDT -----            Name of Who is Calling:ELIEZER RODRIGUEZ [[id number]]      What is the request in detail: Pt would like to know what's available in the next 10 days. She states she spoke with someone earlier who informed her that 7/6 is available. First available appt I show isn't until 7/24. Please call to discuss as she states she needs to set up transportation.      Can the clinic reply by [facility]: no      What Number to Call Back if not in [facility]: [phone]

## 2018-07-06 ENCOUNTER — HOSPITAL ENCOUNTER (OUTPATIENT)
Dept: RADIOLOGY | Facility: OTHER | Age: 68
Discharge: HOME | End: 2018-07-06
Attending: INTERNAL MEDICINE
Payer: MEDICARE

## 2018-07-06 ENCOUNTER — OFFICE VISIT (OUTPATIENT)
Dept: INTERNAL MEDICINE | Facility: CLINIC | Age: 68
End: 2018-07-06
Attending: INTERNAL MEDICINE
Payer: MEDICARE

## 2018-07-06 VITALS
HEART RATE: 81 BPM | SYSTOLIC BLOOD PRESSURE: 142 MMHG | BODY MASS INDEX: 41.24 KG/M2 | OXYGEN SATURATION: 96 % | DIASTOLIC BLOOD PRESSURE: 75 MMHG | WEIGHT: 255.5 LBS

## 2018-07-06 DIAGNOSIS — I10 HYPERTENSION, BENIGN: ICD-10-CM

## 2018-07-06 DIAGNOSIS — M25.472 ANKLE EDEMA, BILATERAL: ICD-10-CM

## 2018-07-06 DIAGNOSIS — R60.9 EDEMA, UNSPECIFIED TYPE: ICD-10-CM

## 2018-07-06 DIAGNOSIS — M25.471 ANKLE EDEMA, BILATERAL: ICD-10-CM

## 2018-07-06 DIAGNOSIS — J44.9 CHRONIC OBSTRUCTIVE PULMONARY DISEASE, UNSPECIFIED COPD TYPE: ICD-10-CM

## 2018-07-06 DIAGNOSIS — G89.28 CHRONIC PAIN FOLLOWING SURGERY OR PROCEDURE: ICD-10-CM

## 2018-07-06 DIAGNOSIS — F11.20 UNCOMPLICATED OPIOID DEPENDENCE: ICD-10-CM

## 2018-07-06 DIAGNOSIS — E04.1 THYROID NODULE: ICD-10-CM

## 2018-07-06 DIAGNOSIS — M81.0 OSTEOPOROSIS, UNSPECIFIED OSTEOPOROSIS TYPE, UNSPECIFIED PATHOLOGICAL FRACTURE PRESENCE: ICD-10-CM

## 2018-07-06 DIAGNOSIS — R63.5 WEIGHT GAIN: ICD-10-CM

## 2018-07-06 DIAGNOSIS — F17.200 TOBACCO DEPENDENCE: ICD-10-CM

## 2018-07-06 DIAGNOSIS — E88.09 HYPOALBUMINEMIA: ICD-10-CM

## 2018-07-06 DIAGNOSIS — M25.471 ANKLE EDEMA, BILATERAL: Primary | ICD-10-CM

## 2018-07-06 DIAGNOSIS — I51.89 DIASTOLIC DYSFUNCTION: ICD-10-CM

## 2018-07-06 DIAGNOSIS — M25.472 ANKLE EDEMA, BILATERAL: Primary | ICD-10-CM

## 2018-07-06 PROCEDURE — 3078F DIAST BP <80 MM HG: CPT | Mod: CPTII,S$GLB,, | Performed by: INTERNAL MEDICINE

## 2018-07-06 PROCEDURE — 93970 EXTREMITY STUDY: CPT | Mod: 26,,, | Performed by: RADIOLOGY

## 2018-07-06 PROCEDURE — 3077F SYST BP >= 140 MM HG: CPT | Mod: CPTII,S$GLB,, | Performed by: INTERNAL MEDICINE

## 2018-07-06 PROCEDURE — 93970 EXTREMITY STUDY: CPT | Mod: TC

## 2018-07-06 PROCEDURE — 99214 OFFICE O/P EST MOD 30 MIN: CPT | Mod: S$GLB,,, | Performed by: INTERNAL MEDICINE

## 2018-07-06 PROCEDURE — 99999 PR PBB SHADOW E&M-EST. PATIENT-LVL IV: CPT | Mod: PBBFAC,,, | Performed by: INTERNAL MEDICINE

## 2018-07-06 RX ORDER — HYDROCODONE BITARTRATE AND ACETAMINOPHEN 10; 325 MG/1; MG/1
1 TABLET ORAL EVERY 12 HOURS
Qty: 60 TABLET | Refills: 0 | Status: SHIPPED | OUTPATIENT
Start: 2018-07-06 | End: 2018-08-13 | Stop reason: SDUPTHER

## 2018-07-06 RX ORDER — ALBUTEROL SULFATE 90 UG/1
AEROSOL, METERED RESPIRATORY (INHALATION)
Qty: 18 G | Refills: 11 | Status: SHIPPED | OUTPATIENT
Start: 2018-07-06 | End: 2020-06-02 | Stop reason: SDUPTHER

## 2018-07-06 RX ORDER — ATENOLOL 50 MG/1
50 TABLET ORAL DAILY
Qty: 90 TABLET | Refills: 3 | Status: SHIPPED | OUTPATIENT
Start: 2018-07-06 | End: 2018-10-05 | Stop reason: SDUPTHER

## 2018-07-06 NOTE — PROGRESS NOTES
Subjective:   Patient ID: Nalini Varma is a 67 y.o. female  Chief complaint:   Chief Complaint   Patient presents with    Edema       HPI    Pt here for 3 month f/u for chronic pain mgmt. Taking norco twice daily. Reports only mildly helps with pain to make it tolerable.   Was Seen by ortho Dr. Mendoza at  on 12/20 rec hold off on surgery per pt - he also dx her with left shoulder OA seen on xray  - pt may have to go through [facility] for knee surgery   She did not f/u with him in March as expected.   Checked  and consistent   tox screen due 8/2018     Tobacco abuse: Cut down on smoking - last cig was 4 days ago   - had attended tob cess counseling in past. Reports not ready to quit at this time  Copd/emphysema - nelson her meds - denies cp, sob, wheezing currently.      pulm nodules: was eval by pulm - did complete repeat CT scan of chest and stable     thyroid nodule: s/p FNA 5/2017 and benign     Hyponatremia attributed to SIADH - eval by nephrology - balancing fluid restriction and renal function - no MS changes     Saw nephrologist for hyperkalemia - this resolved after stopping lisinopril   - currently on amlodipine and atenolol - not checking bp at home   - hctz stopped due to hyponatremia in past    Hx of diastolic dysfunction and reports long hx of intermittent LE edema - reports bilateral ankle edema and foot edema more persistent over past few months but improved since starting diuretic started at last renal appt   Torsemide was ordered and she has been taking this daily   Echo ordered but not completed to date - also overdue for labs  Has beseline MEDELLIN - no orthopnea - this is stable     Review of Systems    Objective:  Vitals:    07/06/18 1435 07/06/18 1515   BP: (!) 162/73 (!) 142/75   Pulse: 81    SpO2: 96%    Weight: 115.9 kg (255 lb 8.2 oz)      Body mass index is 41.24 kg/m².    Physical Exam   Constitutional: She is oriented to person, place, and time. She appears well-developed and  well-nourished.   HENT:   Head: Normocephalic and atraumatic.   Eyes: Conjunctivae and EOM are normal.   Neck: Normal range of motion. Neck supple.   Cardiovascular: Normal rate, regular rhythm and intact distal pulses.    Pulmonary/Chest: Effort normal. No respiratory distress. She has wheezes. She has no rales. She exhibits no tenderness.   Few exp wheezes   Abdominal: Soft. Normal appearance and bowel sounds are normal.   Musculoskeletal:   L > R knee with edema, no inc warmth or redness  +2 distal pulses  hyperpigmented skin at bilateral ankles from chronic venous stasis  No redness   Reports + calf ttp bilaterally      Neurological: She is alert and oriented to person, place, and time. She has normal strength. Gait normal.   Skin: Skin is warm, dry and intact. No cyanosis. Nails show no clubbing.   Psychiatric: She has a normal mood and affect. Her speech is normal and behavior is normal. Cognition and memory are normal.   Vitals reviewed.      Assessment:  1. Ankle edema, bilateral    2. Hypoalbuminemia    3. Weight gain    4. Uncomplicated opioid dependence    5. Tobacco dependence    6. Thyroid nodule s/p biopsy 2017 - benign    7. Hypertension, benign    8. Osteoporosis, unspecified osteoporosis type, unspecified pathological fracture presence    9. Chronic obstructive pulmonary disease, unspecified COPD type    10. Chronic pain following surgery or procedure    11. Edema, unspecified type     12. Diastolic dysfunction        Plan:  Nalini was seen today for edema.    Diagnoses and all orders for this visit:    Ankle edema, bilateral  -     Ambulatory Referral to Vascular Surgery  -     Brain natriuretic peptide; Future  -     TSH; Future  -     Hepatic function panel; Future  -     PREALBUMIN; Future  -     US Lower Extremity Veins Bilateral; Future    Hypoalbuminemia  -     TSH; Future  -     Hepatic function panel; Future  -     PREALBUMIN; Future    Weight gain  -     TSH; Future    Uncomplicated opioid  dependence    Tobacco dependence    Thyroid nodule s/p biopsy 2017 - benign    Hypertension, benign    Osteoporosis, unspecified osteoporosis type, unspecified pathological fracture presence    Chronic obstructive pulmonary disease, unspecified COPD type    Chronic pain following surgery or procedure    Edema, unspecified type   -     US Lower Extremity Veins Bilateral; Future    Diastolic dysfunction    Other orders  -     Hypertension Digital Medicine (HDMP) Enrollment Order  -     Hypertension Digital Medicine ([facility]): Assign Onboarding Questionnaires  -     atenolol (TENORMIN) 50 MG tablet; Take 1 tablet (50 mg total) by mouth once daily.  -     albuterol (VENTOLIN HFA) 90 mcg/actuation inhaler; inhale 1-2 puffs as needed every 6 hours for wheezing and shortness of breath  -     HYDROcodone-acetaminophen (NORCO)  mg per tablet; Take 1 tablet by mouth every 12 hours as needed for pain    LE edema improved   Check labs today   Schedule echo - suspect sx due to venous stasis, hypoalbuminemia - f/u results for other causes   No change in resp status - f/u results   Er and rtc prompts given     Health Maintenance   Topic Date Due    Influenza Vaccine  08/01/2018    Mammogram  03/21/2019    Colonoscopy  05/22/2020    DEXA SCAN  08/23/2020    Lipid Panel  04/24/2023    TETANUS VACCINE  01/29/2025    Hepatitis C Screening  Completed    Zoster Vaccine  Completed    Pneumococcal (65+)  Completed

## 2018-07-09 ENCOUNTER — PATIENT MESSAGE (OUTPATIENT)
Dept: ADMINISTRATIVE | Facility: OTHER | Age: 68
End: 2018-07-09

## 2018-07-09 ENCOUNTER — TELEPHONE (OUTPATIENT)
Dept: VASCULAR SURGERY | Facility: CLINIC | Age: 68
End: 2018-07-09

## 2018-07-09 ENCOUNTER — TELEPHONE (OUTPATIENT)
Dept: INTERNAL MEDICINE | Facility: CLINIC | Age: 68
End: 2018-07-09

## 2018-07-09 DIAGNOSIS — I87.2 VENOUS INSUFFICIENCY OF BOTH LOWER EXTREMITIES: Primary | ICD-10-CM

## 2018-07-09 NOTE — TELEPHONE ENCOUNTER
Call returned. Patient was having difficulty scheduling the ultrasound via my [facility]. Ultrasound scheduled per patient request on July 25.  Deepali Henry RN

## 2018-07-09 NOTE — TELEPHONE ENCOUNTER
----- Message from Mik Sullivan sent at 7/9/2018 11:25 AM CDT -----  Pt needs to schedule consult from referral in system. Please call pt at [phone]

## 2018-07-09 NOTE — TELEPHONE ENCOUNTER
----- Message from Celina Ag sent at 7/9/2018 12:13 PM CDT -----  Contact: [phone]  Pt is requesting a call discuss if she should do an u/s of her neck. Pt was unsure of scheduling/order. Please call patient and advise thanks

## 2018-07-13 ENCOUNTER — HOSPITAL ENCOUNTER (INPATIENT)
Facility: OTHER | Age: 68
LOS: 8 days | Discharge: HOME | DRG: 286 | End: 2018-07-21
Attending: EMERGENCY MEDICINE | Admitting: INTERNAL MEDICINE
Payer: MEDICARE

## 2018-07-13 DIAGNOSIS — J44.9 CHRONIC OBSTRUCTIVE PULMONARY DISEASE: ICD-10-CM

## 2018-07-13 DIAGNOSIS — J44.1 COPD EXACERBATION: Primary | ICD-10-CM

## 2018-07-13 DIAGNOSIS — F17.200 TOBACCO DEPENDENCE: ICD-10-CM

## 2018-07-13 DIAGNOSIS — I50.31 ACUTE DIASTOLIC CONGESTIVE HEART FAILURE: ICD-10-CM

## 2018-07-13 DIAGNOSIS — R79.89 ELEVATED BRAIN NATRIURETIC PEPTIDE (BNP) LEVEL: ICD-10-CM

## 2018-07-13 DIAGNOSIS — J96.11 CHRONIC RESPIRATORY FAILURE WITH HYPOXIA: ICD-10-CM

## 2018-07-13 DIAGNOSIS — R06.02 SHORTNESS OF BREATH: ICD-10-CM

## 2018-07-13 DIAGNOSIS — R60.1 ANASARCA: ICD-10-CM

## 2018-07-13 DIAGNOSIS — E87.1 HYPONATREMIA: ICD-10-CM

## 2018-07-13 DIAGNOSIS — I50.9 CONGESTIVE HEART FAILURE (CHF): ICD-10-CM

## 2018-07-13 DIAGNOSIS — I10 HYPERTENSION, BENIGN: ICD-10-CM

## 2018-07-13 LAB
ALBUMIN SERPL BCP-MCNC: 3.4 G/DL
ALLENS TEST: ABNORMAL
ALP SERPL-CCNC: 96 U/L
ALT SERPL W/O P-5'-P-CCNC: 14 U/L
ANION GAP SERPL CALC-SCNC: 11 MMOL/L
ANION GAP SERPL CALC-SCNC: 12 MMOL/L
AST SERPL-CCNC: 15 U/L
BACTERIA #/AREA URNS HPF: NORMAL /HPF
BASOPHILS # BLD AUTO: 0.02 K/UL
BASOPHILS # BLD AUTO: 0.02 K/UL
BASOPHILS NFR BLD: 0.2 %
BASOPHILS NFR BLD: 0.2 %
BILIRUB SERPL-MCNC: 0.3 MG/DL
BILIRUB UR QL STRIP: NEGATIVE
BNP SERPL-MCNC: 335 PG/ML
BUN SERPL-MCNC: 12 MG/DL
BUN SERPL-MCNC: 12 MG/DL
CALCIUM SERPL-MCNC: 8.8 MG/DL
CALCIUM SERPL-MCNC: 9 MG/DL
CHLORIDE SERPL-SCNC: 88 MMOL/L
CHLORIDE SERPL-SCNC: 89 MMOL/L
CLARITY UR: CLEAR
CO2 SERPL-SCNC: 20 MMOL/L
CO2 SERPL-SCNC: 21 MMOL/L
COLOR UR: YELLOW
CREAT SERPL-MCNC: 0.8 MG/DL
CREAT SERPL-MCNC: 0.8 MG/DL
DELSYS: ABNORMAL
DIFFERENTIAL METHOD: ABNORMAL
DIFFERENTIAL METHOD: ABNORMAL
EOSINOPHIL # BLD AUTO: 0 K/UL
EOSINOPHIL # BLD AUTO: 0.1 K/UL
EOSINOPHIL NFR BLD: 0.1 %
EOSINOPHIL NFR BLD: 0.4 %
ERYTHROCYTE [DISTWIDTH] IN BLOOD BY AUTOMATED COUNT: 13.7 %
ERYTHROCYTE [DISTWIDTH] IN BLOOD BY AUTOMATED COUNT: 13.7 %
EST. GFR  (AFRICAN AMERICAN): >60 ML/MIN/1.73 M^2
EST. GFR  (AFRICAN AMERICAN): >60 ML/MIN/1.73 M^2
EST. GFR  (NON AFRICAN AMERICAN): >60 ML/MIN/1.73 M^2
EST. GFR  (NON AFRICAN AMERICAN): >60 ML/MIN/1.73 M^2
FIO2: 40
FLOW: 5
GLUCOSE SERPL-MCNC: 115 MG/DL
GLUCOSE SERPL-MCNC: 122 MG/DL
GLUCOSE UR QL STRIP: NEGATIVE
HCO3 UR-SCNC: 22.1 MMOL/L (ref 24–28)
HCT VFR BLD AUTO: 33.4 %
HCT VFR BLD AUTO: 34.5 %
HGB BLD-MCNC: 11.4 G/DL
HGB BLD-MCNC: 11.6 G/DL
HGB UR QL STRIP: NEGATIVE
HYALINE CASTS #/AREA URNS LPF: 0 /LPF
KETONES UR QL STRIP: NEGATIVE
LEUKOCYTE ESTERASE UR QL STRIP: NEGATIVE
LYMPHOCYTES # BLD AUTO: 0.8 K/UL
LYMPHOCYTES # BLD AUTO: 1.1 K/UL
LYMPHOCYTES NFR BLD: 6.7 %
LYMPHOCYTES NFR BLD: 9.7 %
MCH RBC QN AUTO: 30.2 PG
MCH RBC QN AUTO: 30.6 PG
MCHC RBC AUTO-ENTMCNC: 33.6 G/DL
MCHC RBC AUTO-ENTMCNC: 34.1 G/DL
MCV RBC AUTO: 90 FL
MCV RBC AUTO: 90 FL
MICROSCOPIC COMMENT: NORMAL
MODE: ABNORMAL
MONOCYTES # BLD AUTO: 0.4 K/UL
MONOCYTES # BLD AUTO: 0.9 K/UL
MONOCYTES NFR BLD: 3.2 %
MONOCYTES NFR BLD: 8.2 %
NEUTROPHILS # BLD AUTO: 10.1 K/UL
NEUTROPHILS # BLD AUTO: 9.1 K/UL
NEUTROPHILS NFR BLD: 81 %
NEUTROPHILS NFR BLD: 89.4 %
NITRITE UR QL STRIP: NEGATIVE
PCO2 BLDA: 41.6 MMHG (ref 35–45)
PH SMN: 7.33 [PH] (ref 7.35–7.45)
PH UR STRIP: 6 [PH] (ref 5–8)
PLATELET # BLD AUTO: 257 K/UL
PLATELET # BLD AUTO: 281 K/UL
PMV BLD AUTO: 9 FL
PMV BLD AUTO: 9.6 FL
PO2 BLDA: 68 MMHG (ref 80–100)
POC BE: -4 MMOL/L
POC SATURATED O2: 92 % (ref 95–100)
POTASSIUM SERPL-SCNC: 4 MMOL/L
POTASSIUM SERPL-SCNC: 4 MMOL/L
PROT SERPL-MCNC: 6.8 G/DL
PROT UR QL STRIP: ABNORMAL
RBC # BLD AUTO: 3.73 M/UL
RBC # BLD AUTO: 3.84 M/UL
RBC #/AREA URNS HPF: 1 /HPF (ref 0–4)
SAMPLE: ABNORMAL
SITE: ABNORMAL
SODIUM SERPL-SCNC: 120 MMOL/L
SODIUM SERPL-SCNC: 121 MMOL/L
SP GR UR STRIP: <=1.005 (ref 1–1.03)
SP02: 94
TROPONIN I SERPL DL<=0.01 NG/ML-MCNC: 0.01 NG/ML
URN SPEC COLLECT METH UR: ABNORMAL
UROBILINOGEN UR STRIP-ACNC: NEGATIVE EU/DL
WBC # BLD AUTO: 11.2 K/UL
WBC # BLD AUTO: 11.31 K/UL
WBC #/AREA URNS HPF: 2 /HPF (ref 0–5)

## 2018-07-13 PROCEDURE — 99291 CRITICAL CARE FIRST HOUR: CPT | Mod: 25

## 2018-07-13 PROCEDURE — 27000221 HC OXYGEN, UP TO 24 HOURS

## 2018-07-13 PROCEDURE — 20000000 HC ICU ROOM

## 2018-07-13 PROCEDURE — 36415 COLL VENOUS BLD VENIPUNCTURE: CPT

## 2018-07-13 PROCEDURE — 25000242 PHARM REV CODE 250 ALT 637 W/ HCPCS: Performed by: EMERGENCY MEDICINE

## 2018-07-13 PROCEDURE — 96374 THER/PROPH/DIAG INJ IV PUSH: CPT

## 2018-07-13 PROCEDURE — 94761 N-INVAS EAR/PLS OXIMETRY MLT: CPT

## 2018-07-13 PROCEDURE — 83880 ASSAY OF NATRIURETIC PEPTIDE: CPT

## 2018-07-13 PROCEDURE — 25000003 PHARM REV CODE 250: Performed by: PHYSICIAN ASSISTANT

## 2018-07-13 PROCEDURE — 94640 AIRWAY INHALATION TREATMENT: CPT

## 2018-07-13 PROCEDURE — 99223 1ST HOSP IP/OBS HIGH 75: CPT | Mod: ,,, | Performed by: PHYSICIAN ASSISTANT

## 2018-07-13 PROCEDURE — 93010 ELECTROCARDIOGRAM REPORT: CPT | Mod: ,,, | Performed by: INTERNAL MEDICINE

## 2018-07-13 PROCEDURE — 63600175 PHARM REV CODE 636 W HCPCS: Performed by: EMERGENCY MEDICINE

## 2018-07-13 PROCEDURE — 80048 BASIC METABOLIC PNL TOTAL CA: CPT

## 2018-07-13 PROCEDURE — 96375 TX/PRO/DX INJ NEW DRUG ADDON: CPT

## 2018-07-13 PROCEDURE — 36600 WITHDRAWAL OF ARTERIAL BLOOD: CPT

## 2018-07-13 PROCEDURE — 80053 COMPREHEN METABOLIC PANEL: CPT

## 2018-07-13 PROCEDURE — 25500020 PHARM REV CODE 255: Performed by: EMERGENCY MEDICINE

## 2018-07-13 PROCEDURE — 82803 BLOOD GASES ANY COMBINATION: CPT

## 2018-07-13 PROCEDURE — 84484 ASSAY OF TROPONIN QUANT: CPT

## 2018-07-13 PROCEDURE — 81000 URINALYSIS NONAUTO W/SCOPE: CPT

## 2018-07-13 PROCEDURE — 85025 COMPLETE CBC W/AUTO DIFF WBC: CPT

## 2018-07-13 PROCEDURE — 94644 CONT INHLJ TX 1ST HOUR: CPT

## 2018-07-13 RX ORDER — AMLODIPINE BESYLATE 5 MG/1
10 TABLET ORAL DAILY
Status: DISCONTINUED | OUTPATIENT
Start: 2018-07-14 | End: 2018-07-21 | Stop reason: HOSPADM

## 2018-07-13 RX ORDER — HYDROCODONE BITARTRATE AND ACETAMINOPHEN 10; 325 MG/1; MG/1
1 TABLET ORAL EVERY 12 HOURS PRN
Status: DISCONTINUED | OUTPATIENT
Start: 2018-07-13 | End: 2018-07-20 | Stop reason: SDUPTHER

## 2018-07-13 RX ORDER — LANOLIN ALCOHOL/MO/W.PET/CERES
1000 CREAM (GRAM) TOPICAL DAILY
Status: DISCONTINUED | OUTPATIENT
Start: 2018-07-14 | End: 2018-07-13

## 2018-07-13 RX ORDER — IBUPROFEN 200 MG
1 TABLET ORAL DAILY
Status: DISCONTINUED | OUTPATIENT
Start: 2018-07-14 | End: 2018-07-21 | Stop reason: HOSPADM

## 2018-07-13 RX ORDER — ATORVASTATIN CALCIUM 20 MG/1
40 TABLET, FILM COATED ORAL DAILY
Status: DISCONTINUED | OUTPATIENT
Start: 2018-07-14 | End: 2018-07-21 | Stop reason: HOSPADM

## 2018-07-13 RX ORDER — ACETAMINOPHEN 325 MG/1
650 TABLET ORAL EVERY 8 HOURS PRN
Status: DISCONTINUED | OUTPATIENT
Start: 2018-07-13 | End: 2018-07-13

## 2018-07-13 RX ORDER — ONDANSETRON 8 MG/1
8 TABLET, ORALLY DISINTEGRATING ORAL EVERY 8 HOURS PRN
Status: DISCONTINUED | OUTPATIENT
Start: 2018-07-13 | End: 2018-07-21 | Stop reason: HOSPADM

## 2018-07-13 RX ORDER — SODIUM CHLORIDE 9 MG/ML
INJECTION, SOLUTION INTRAVENOUS CONTINUOUS
Status: DISCONTINUED | OUTPATIENT
Start: 2018-07-13 | End: 2018-07-13

## 2018-07-13 RX ORDER — HYDROCODONE BITARTRATE AND ACETAMINOPHEN 5; 325 MG/1; MG/1
1 TABLET ORAL EVERY 4 HOURS PRN
Status: DISCONTINUED | OUTPATIENT
Start: 2018-07-13 | End: 2018-07-20 | Stop reason: SDUPTHER

## 2018-07-13 RX ORDER — ALBUTEROL SULFATE 0.83 MG/ML
10 SOLUTION RESPIRATORY (INHALATION)
Status: COMPLETED | OUTPATIENT
Start: 2018-07-13 | End: 2018-07-13

## 2018-07-13 RX ORDER — SODIUM CHLORIDE 0.9 % (FLUSH) 0.9 %
3 SYRINGE (ML) INJECTION
Status: DISCONTINUED | OUTPATIENT
Start: 2018-07-13 | End: 2018-07-21 | Stop reason: HOSPADM

## 2018-07-13 RX ORDER — HYDRALAZINE HYDROCHLORIDE 25 MG/1
25 TABLET, FILM COATED ORAL EVERY 6 HOURS PRN
Status: DISCONTINUED | OUTPATIENT
Start: 2018-07-13 | End: 2018-07-20

## 2018-07-13 RX ORDER — CALCIUM CARBONATE 1250 MG/5ML
600 SUSPENSION ORAL 2 TIMES DAILY WITH MEALS
Status: DISCONTINUED | OUTPATIENT
Start: 2018-07-14 | End: 2018-07-13

## 2018-07-13 RX ORDER — FAMOTIDINE 20 MG/1
20 TABLET, FILM COATED ORAL 2 TIMES DAILY
Status: DISCONTINUED | OUTPATIENT
Start: 2018-07-13 | End: 2018-07-21 | Stop reason: HOSPADM

## 2018-07-13 RX ORDER — ATENOLOL 25 MG/1
50 TABLET ORAL DAILY
Status: DISCONTINUED | OUTPATIENT
Start: 2018-07-14 | End: 2018-07-15

## 2018-07-13 RX ORDER — METHYLPREDNISOLONE SOD SUCC 125 MG
125 VIAL (EA) INJECTION ONCE
Status: COMPLETED | OUTPATIENT
Start: 2018-07-13 | End: 2018-07-13

## 2018-07-13 RX ORDER — IPRATROPIUM BROMIDE AND ALBUTEROL SULFATE 2.5; .5 MG/3ML; MG/3ML
3 SOLUTION RESPIRATORY (INHALATION) EVERY 4 HOURS
Status: DISCONTINUED | OUTPATIENT
Start: 2018-07-14 | End: 2018-07-21 | Stop reason: HOSPADM

## 2018-07-13 RX ORDER — FUROSEMIDE 10 MG/ML
60 INJECTION INTRAMUSCULAR; INTRAVENOUS
Status: COMPLETED | OUTPATIENT
Start: 2018-07-13 | End: 2018-07-13

## 2018-07-13 RX ORDER — IPRATROPIUM BROMIDE AND ALBUTEROL SULFATE 2.5; .5 MG/3ML; MG/3ML
3 SOLUTION RESPIRATORY (INHALATION)
Status: DISPENSED | OUTPATIENT
Start: 2018-07-13 | End: 2018-07-13

## 2018-07-13 RX ORDER — IPRATROPIUM BROMIDE AND ALBUTEROL SULFATE 2.5; .5 MG/3ML; MG/3ML
3 SOLUTION RESPIRATORY (INHALATION) EVERY 4 HOURS
Status: DISCONTINUED | OUTPATIENT
Start: 2018-07-14 | End: 2018-07-13 | Stop reason: CLARIF

## 2018-07-13 RX ORDER — ACETAMINOPHEN 325 MG/1
650 TABLET ORAL EVERY 8 HOURS PRN
Status: DISCONTINUED | OUTPATIENT
Start: 2018-07-13 | End: 2018-07-21 | Stop reason: HOSPADM

## 2018-07-13 RX ORDER — LANOLIN ALCOHOL/MO/W.PET/CERES
400 CREAM (GRAM) TOPICAL DAILY
Status: DISCONTINUED | OUTPATIENT
Start: 2018-07-14 | End: 2018-07-13

## 2018-07-13 RX ORDER — METOPROLOL TARTRATE 1 MG/ML
5 INJECTION, SOLUTION INTRAVENOUS ONCE AS NEEDED
Status: DISCONTINUED | OUTPATIENT
Start: 2018-07-14 | End: 2018-07-14

## 2018-07-13 RX ORDER — PREDNISONE 20 MG/1
40 TABLET ORAL DAILY
Status: DISCONTINUED | OUTPATIENT
Start: 2018-07-14 | End: 2018-07-15

## 2018-07-13 RX ORDER — ENOXAPARIN SODIUM 100 MG/ML
40 INJECTION SUBCUTANEOUS EVERY 24 HOURS
Status: DISCONTINUED | OUTPATIENT
Start: 2018-07-13 | End: 2018-07-20

## 2018-07-13 RX ADMIN — IOHEXOL 100 ML: 350 INJECTION, SOLUTION INTRAVENOUS at 08:07

## 2018-07-13 RX ADMIN — IPRATROPIUM BROMIDE AND ALBUTEROL SULFATE 3 ML: .5; 3 SOLUTION RESPIRATORY (INHALATION) at 07:07

## 2018-07-13 RX ADMIN — FUROSEMIDE 60 MG: 10 INJECTION, SOLUTION INTRAVENOUS at 08:07

## 2018-07-13 RX ADMIN — ALBUTEROL SULFATE 10 MG: 2.5 SOLUTION RESPIRATORY (INHALATION) at 08:07

## 2018-07-13 RX ADMIN — HYDRALAZINE HYDROCHLORIDE 25 MG: 25 TABLET, FILM COATED ORAL at 10:07

## 2018-07-13 RX ADMIN — METHYLPREDNISOLONE SODIUM SUCCINATE 125 MG: 125 INJECTION, POWDER, FOR SOLUTION INTRAMUSCULAR; INTRAVENOUS at 07:07

## 2018-07-13 NOTE — ED PROVIDER NOTES
"Encounter Date: 7/13/2018    SCRIBE #1 NOTE: I, Micki Hillman, am scribing for, and in the presence of, Dr. Fritz.       History     Chief Complaint   Patient presents with    Shortness of Breath     Hx of COPD     Time seen by provider: 6:51 PM    This is a 67 y.o. female with hx of HTN, HLD, COPD, and CKD who presents with complaint of SOB "for some time," worsening today. She states "I feel like I'm choking all the time." SOB is exacerbated with ambulating 5 or more feet. Pt also reports BLE swelling x one month and LUE swelling in the past few days. She has been evaluated by PCP recently for extremity swelling. Pt denies hx of CHF. She does not have at-home breathing treatments. She admits to smoking. Pt denies any fever, chills, chest pain, palpitations, cough, N/V/D, abdominal pain, numbness, weakness, dizziness, and lightheadedness.      The history is provided by the patient.     Review of patient's allergies indicates:   Allergen Reactions    Wellbutrin [bupropion hcl] Other (See Comments)     Hyponatremia and hypomagnesia     Zoloft [sertraline] Other (See Comments)     Hyponatremia and hypomagnesia      Past Medical History:   Diagnosis Date    Allergy     Anemia due to gastrointestinal blood loss     LESLY from gastric ulcer due to chronic nsaid use    Anxiety     Arthritis     CKD (chronic kidney disease) stage 3, GFR 30-59 ml/min     followed by [facility] study - recieved labs from 2017 and 2/2018    Gastric ulcer     H/O knee surgery 2001    right    Hx of psychiatric care     Hyperlipidemia     Hypertension     Hyponatremia     Psychiatric problem     Therapy     Tobacco abuse      Past Surgical History:   Procedure Laterality Date    COLONOSCOPY  2015    ESOPHAGOGASTRODUODENOSCOPY  2015    FRACTURE SURGERY      left femur    JOINT REPLACEMENT  2007    left knee x 2, 2nd performed 2/2 to MRSA infection 3 months after 1st surgery    ORIF FEMUR FRACTURE Left     TUBAL " LIGATION       Family History   Problem Relation Age of Onset    Hypertension Mother     Alzheimer's disease Mother     Dementia Mother     Depression Mother     Myasthenia gravis Father     Arthritis Father     Rectal cancer Father     Hypertension Brother     Arthritis Brother     Colon cancer Brother     No Known Problems Son     Cancer Sister         brain    Melanoma Neg Hx     Breast cancer Neg Hx      Social History   Substance Use Topics    Smoking status: Current Every Day Smoker     Packs/day: 1.50     Last attempt to quit: 2/3/1967    Smokeless tobacco: Never Used    Alcohol use Yes      Comment: socially, last had cocktails 2-3 weeks ago      Review of Systems   Constitutional: Negative for chills and fever.   HENT: Negative for congestion, rhinorrhea and sore throat.    Respiratory: Positive for shortness of breath. Negative for cough.    Cardiovascular: Positive for leg swelling. Negative for chest pain and palpitations.   Gastrointestinal: Negative for abdominal pain, diarrhea, nausea and vomiting.   Endocrine: Negative for polyuria.   Genitourinary: Negative for decreased urine volume and dysuria.   Musculoskeletal: Negative for back pain.        LUE swelling.   Skin: Negative for rash.   Allergic/Immunologic: Negative for immunocompromised state.   Neurological: Negative for dizziness, syncope, weakness, light-headedness, numbness and headaches.   Hematological: Does not bruise/bleed easily.   Psychiatric/Behavioral: Negative for confusion.       Physical Exam     Initial Vitals [07/13/18 1854]   BP Pulse Resp Temp SpO2   (!) 186/91 82 (!) 26 97.7 °F (36.5 °C) (!) 88 %      MAP       --         Physical Exam    Nursing note and vitals reviewed.  Constitutional: She appears well-developed and well-nourished. She is cooperative.  Non-toxic appearance. She appears distressed.   HENT:   Head: Normocephalic and atraumatic.   Mouth/Throat: Oropharynx is clear and moist.   L sided  periorbital edema.   Eyes: Conjunctivae and EOM are normal. Pupils are equal, round, and reactive to light.   Neck: Normal range of motion and full passive range of motion without pain. Neck supple. No thyromegaly present. No JVD present.   Cardiovascular: Normal rate, regular rhythm, normal heart sounds and normal pulses.   Pulmonary/Chest: She is in respiratory distress. She has wheezes. She has no rales.   Obviously short of breath. Increased work of breathing. Diffuse expiratory wheezing with prolonged inspirations. Sitting upright.    Abdominal: Soft. Bowel sounds are normal. She exhibits no distension and no mass. There is no tenderness.   Obese, non tender, distended   Musculoskeletal: Normal range of motion.   LUE 2+ pitting edema. BLE 3+ tense pitting edema to the knees.    Neurological: She is alert and oriented to person, place, and time. She has normal strength. No cranial nerve deficit or sensory deficit.   Skin: Skin is warm, dry and intact. No rash noted.   Psychiatric: She has a normal mood and affect. Her speech is normal and behavior is normal. Judgment and thought content normal.         ED Course   Critical Care  Date/Time: 7/13/2018 8:26 PM  Performed by: RAMEZ CASTAÑEDA  Authorized by: RAMEZ CASTAÑEDA   Direct patient critical care time: 19 minutes  Additional history critical care time: 7 minutes  Ordering / reviewing critical care time: 9 minutes  Documentation critical care time: 7 minutes  Consulting other physicians critical care time: 4 minutes  Total critical care time (exclusive of procedural time) : 46 minutes  Critical care time was exclusive of separately billable procedures and treating other patients.  Critical care was necessary to treat or prevent imminent or life-threatening deterioration of the following conditions: respiratory crisis.  Critical care was time spent personally by me on the following activities: development of treatment plan with patient or surrogate,  discussions with consultants, evaluation of patient's response to treatment, examination of patient, obtaining history from patient or surrogate, ordering and performing treatments and interventions, ordering and review of laboratory studies, ordering and review of radiographic studies, pulse oximetry, re-evaluation of patient's condition and review of old charts.        Labs Reviewed   CBC W/ AUTO DIFFERENTIAL - Abnormal; Notable for the following:        Result Value    RBC 3.73 (*)     Hemoglobin 11.4 (*)     Hematocrit 33.4 (*)     MPV 9.0 (*)     Gran # (ANC) 9.1 (*)     Gran% 81.0 (*)     Lymph% 9.7 (*)     All other components within normal limits   COMPREHENSIVE METABOLIC PANEL - Abnormal; Notable for the following:     Sodium 121 (*)     Chloride 89 (*)     CO2 20 (*)     Glucose 115 (*)     Albumin 3.4 (*)     All other components within normal limits   B-TYPE NATRIURETIC PEPTIDE - Abnormal; Notable for the following:      (*)     All other components within normal limits   TROPONIN I   URINALYSIS     EKG Readings: (Independently Interpreted)   NSR at a rate of 80. RAD. T wave inversion to lead III. Widened complex QRS. Unchanged from EKG on April 2018. Artifact present.       Imaging Results          CTA Chest Non-Coronary (PE Study) (In process)                X-Ray Chest 1 View (Final result)  Result time 07/13/18 20:07:22    Final result by Vashti Arenas MD (07/13/18 20:07:22)                 Impression:      Detrimental changes in the appearance of the chest radiograph since December 5, 2016 including increase in cardiac size, engorgement of the pulmonary vascularity, new bilateral pleural effusions, and increased pulmonary parenchymal opacification, most likely due to edema.      Electronically signed by: Vashti Arenas MD  Date:    07/13/2018  Time:    20:07             Narrative:    EXAMINATION:  XR CHEST 1 VIEW    CLINICAL HISTORY:  .    TECHNIQUE:  Single frontal portable view of  the chest was performed.    COMPARISON:  December 5, 2016    FINDINGS:  Cardiac silhouette is enlarged.  Pulmonary vascularity is increased.  There are bilateral pleural effusions.  There is bilateral increased pulmonary parenchymal opacification with a mixed interstitial and alveolar pattern which could be due to edema given the clinical history of shortness of breath.  No pneumothorax is seen.  Visualized osseous structures are stable with degenerative changes of the spine and left shoulder.                              X-Rays:   Independently Interpreted Readings:   Chest X-Ray: Cardiomegaly present. Bilateral pleural effusions present.     Medical Decision Making:   Initial Assessment:   Urgent evaluation of 67-year-old female smoker with known COPD, prior dx of chronic  hyponatremia attributed to SIADH, long hx of LE edema here with acute worsening shortness of breath and difficulty ambulating 2/2 dyspnea.  Here pt in acute resp distress w increasign work of breathing, patient sitting upright, diffuse expiratory wheezing, with tachypnea, impressive lower extremity pitting edema, left upper extremity edema greater than right. Suspect COPD component with plan for eval for fluid overload, chf and reassess.  Pt has had B LE US neg for DVT, echo EF 65% w diastolic dysfunction 2016.   Independently Interpreted Test(s):   I have ordered and independently interpreted X-rays - see prior notes.  I have ordered and independently interpreted EKG Reading(s) - see prior notes  Clinical Tests:   Lab Tests: Ordered and Reviewed  Radiological Study: Ordered and Reviewed  Medical Tests: Ordered and Reviewed  ED Management:  Labs notable for severe metabolic derangements, sodium 121 bicarb 20, cl 89, nml Cr, nml anion gap, bnp 335, neg trop x 1. Suspect chf component w fluid overload. Pt improved sp nebulizers, awaiting abg.  Chest x-ray with cardiomegaly and increased pulmonary vascular markings.  Lasix given for  diuresis    8:14 PM - Discussed pt with Cici Lilly PA-C, who is agreeable to admission to ICU. Will admit to Dr. Gutierrez  .  Other:   I have discussed this case with another health care provider.            Scribe Attestation:   Scribe #1: I performed the above scribed service and the documentation accurately describes the services I performed. I attest to the accuracy of the note.    Attending Attestation:           Physician Attestation for Scribe:  Physician Attestation Statement for Scribe #1: I, Dr. Fritz, reviewed documentation, as scribed by Micki Hillman in my presence, and it is both accurate and complete.                    Clinical Impression:     1. COPD exacerbation    2. Shortness of breath    3. Hyponatremia    4. Anasarca          Disposition:   Disposition: Admitted  Condition: Critical                        Vashti Fritz MD  07/13/18 2044

## 2018-07-14 LAB
ANION GAP SERPL CALC-SCNC: 10 MMOL/L
ANION GAP SERPL CALC-SCNC: 12 MMOL/L
ANION GAP SERPL CALC-SCNC: 12 MMOL/L
ANION GAP SERPL CALC-SCNC: 7 MMOL/L
ANION GAP SERPL CALC-SCNC: 8 MMOL/L
ANION GAP SERPL CALC-SCNC: 9 MMOL/L
BUN SERPL-MCNC: 12 MG/DL
BUN SERPL-MCNC: 13 MG/DL
BUN SERPL-MCNC: 15 MG/DL
BUN SERPL-MCNC: 15 MG/DL
BUN SERPL-MCNC: 16 MG/DL
BUN SERPL-MCNC: 17 MG/DL
CALCIUM SERPL-MCNC: 8.5 MG/DL
CALCIUM SERPL-MCNC: 8.6 MG/DL
CALCIUM SERPL-MCNC: 8.7 MG/DL
CHLORIDE SERPL-SCNC: 88 MMOL/L
CHLORIDE SERPL-SCNC: 88 MMOL/L
CHLORIDE SERPL-SCNC: 89 MMOL/L
CO2 SERPL-SCNC: 20 MMOL/L
CO2 SERPL-SCNC: 22 MMOL/L
CO2 SERPL-SCNC: 22 MMOL/L
CO2 SERPL-SCNC: 23 MMOL/L
CO2 SERPL-SCNC: 24 MMOL/L
CO2 SERPL-SCNC: 25 MMOL/L
CREAT SERPL-MCNC: 0.8 MG/DL
CREAT SERPL-MCNC: 0.9 MG/DL
CREAT UR-MCNC: 64.8 MG/DL
CREAT UR-MCNC: 64.8 MG/DL
DIASTOLIC DYSFUNCTION: YES
EST. GFR  (AFRICAN AMERICAN): >60 ML/MIN/1.73 M^2
EST. GFR  (NON AFRICAN AMERICAN): >60 ML/MIN/1.73 M^2
GLUCOSE SERPL-MCNC: 162 MG/DL
GLUCOSE SERPL-MCNC: 164 MG/DL
GLUCOSE SERPL-MCNC: 165 MG/DL
GLUCOSE SERPL-MCNC: 165 MG/DL
GLUCOSE SERPL-MCNC: 166 MG/DL
GLUCOSE SERPL-MCNC: 168 MG/DL
OSMOLALITY UR: 323 MOSM/KG
POTASSIUM SERPL-SCNC: 4.3 MMOL/L
POTASSIUM SERPL-SCNC: 4.4 MMOL/L
POTASSIUM SERPL-SCNC: 4.5 MMOL/L
PROT UR-MCNC: 28 MG/DL
PROT/CREAT RATIO, UR: 0.43
RETIRED EF AND QEF - SEE NOTES: 61 (ref 55–65)
SODIUM SERPL-SCNC: 120 MMOL/L
SODIUM SERPL-SCNC: 121 MMOL/L
SODIUM SERPL-SCNC: 122 MMOL/L
SODIUM UR-SCNC: <20 MMOL/L
URATE SERPL-MCNC: 8.3 MG/DL
URATE UR-MCNC: 29 MG/DL

## 2018-07-14 PROCEDURE — 84156 ASSAY OF PROTEIN URINE: CPT

## 2018-07-14 PROCEDURE — 84560 ASSAY OF URINE/URIC ACID: CPT

## 2018-07-14 PROCEDURE — 94640 AIRWAY INHALATION TREATMENT: CPT

## 2018-07-14 PROCEDURE — 99233 SBSQ HOSP IP/OBS HIGH 50: CPT | Mod: ,,, | Performed by: INTERNAL MEDICINE

## 2018-07-14 PROCEDURE — 94761 N-INVAS EAR/PLS OXIMETRY MLT: CPT

## 2018-07-14 PROCEDURE — 93306 TTE W/DOPPLER COMPLETE: CPT

## 2018-07-14 PROCEDURE — 27000221 HC OXYGEN, UP TO 24 HOURS

## 2018-07-14 PROCEDURE — 36556 INSERT NON-TUNNEL CV CATH: CPT

## 2018-07-14 PROCEDURE — 51702 INSERT TEMP BLADDER CATH: CPT

## 2018-07-14 PROCEDURE — 20000000 HC ICU ROOM

## 2018-07-14 PROCEDURE — 80048 BASIC METABOLIC PNL TOTAL CA: CPT

## 2018-07-14 PROCEDURE — 25000003 PHARM REV CODE 250: Performed by: INTERNAL MEDICINE

## 2018-07-14 PROCEDURE — S4991 NICOTINE PATCH NONLEGEND: HCPCS | Performed by: PHYSICIAN ASSISTANT

## 2018-07-14 PROCEDURE — 84550 ASSAY OF BLOOD/URIC ACID: CPT

## 2018-07-14 PROCEDURE — 02HV33Z INSERTION OF INFUSION DEVICE INTO SUPERIOR VENA CAVA, PERCUTANEOUS APPROACH: ICD-10-PCS | Performed by: HOSPITALIST

## 2018-07-14 PROCEDURE — 83935 ASSAY OF URINE OSMOLALITY: CPT

## 2018-07-14 PROCEDURE — 84300 ASSAY OF URINE SODIUM: CPT

## 2018-07-14 PROCEDURE — 63600175 PHARM REV CODE 636 W HCPCS: Performed by: PHYSICIAN ASSISTANT

## 2018-07-14 PROCEDURE — 25000242 PHARM REV CODE 250 ALT 637 W/ HCPCS: Performed by: INTERNAL MEDICINE

## 2018-07-14 PROCEDURE — 36415 COLL VENOUS BLD VENIPUNCTURE: CPT

## 2018-07-14 PROCEDURE — 25000003 PHARM REV CODE 250: Performed by: PHYSICIAN ASSISTANT

## 2018-07-14 RX ORDER — METOPROLOL TARTRATE 1 MG/ML
5 INJECTION, SOLUTION INTRAVENOUS ONCE AS NEEDED
Status: ACTIVE | OUTPATIENT
Start: 2018-07-14 | End: 2018-07-14

## 2018-07-14 RX ORDER — TOLVAPTAN 15 MG/1
15 TABLET ORAL ONCE
Status: COMPLETED | OUTPATIENT
Start: 2018-07-14 | End: 2018-07-14

## 2018-07-14 RX ORDER — CLONIDINE HYDROCHLORIDE 0.1 MG/1
0.1 TABLET ORAL ONCE
Status: COMPLETED | OUTPATIENT
Start: 2018-07-14 | End: 2018-07-14

## 2018-07-14 RX ADMIN — TOLVAPTAN 15 MG: 15 TABLET ORAL at 12:07

## 2018-07-14 RX ADMIN — FAMOTIDINE 20 MG: 20 TABLET ORAL at 12:07

## 2018-07-14 RX ADMIN — ENOXAPARIN SODIUM 40 MG: 100 INJECTION SUBCUTANEOUS at 04:07

## 2018-07-14 RX ADMIN — HYDRALAZINE HYDROCHLORIDE 25 MG: 25 TABLET, FILM COATED ORAL at 04:07

## 2018-07-14 RX ADMIN — IPRATROPIUM BROMIDE AND ALBUTEROL SULFATE 3 ML: .5; 3 SOLUTION RESPIRATORY (INHALATION) at 11:07

## 2018-07-14 RX ADMIN — IPRATROPIUM BROMIDE AND ALBUTEROL SULFATE 3 ML: .5; 3 SOLUTION RESPIRATORY (INHALATION) at 07:07

## 2018-07-14 RX ADMIN — ENOXAPARIN SODIUM 40 MG: 100 INJECTION SUBCUTANEOUS at 12:07

## 2018-07-14 RX ADMIN — IPRATROPIUM BROMIDE AND ALBUTEROL SULFATE 3 ML: .5; 3 SOLUTION RESPIRATORY (INHALATION) at 12:07

## 2018-07-14 RX ADMIN — HYDROCODONE BITARTRATE AND ACETAMINOPHEN 1 TABLET: 10; 325 TABLET ORAL at 04:07

## 2018-07-14 RX ADMIN — CLONIDINE HYDROCHLORIDE 0.1 MG: 0.1 TABLET ORAL at 06:07

## 2018-07-14 RX ADMIN — NICOTINE 1 PATCH: 21 PATCH, EXTENDED RELEASE TRANSDERMAL at 09:07

## 2018-07-14 RX ADMIN — IPRATROPIUM BROMIDE AND ALBUTEROL SULFATE 3 ML: .5; 3 SOLUTION RESPIRATORY (INHALATION) at 03:07

## 2018-07-14 RX ADMIN — FAMOTIDINE 20 MG: 20 TABLET ORAL at 09:07

## 2018-07-14 RX ADMIN — ATORVASTATIN CALCIUM 40 MG: 20 TABLET, FILM COATED ORAL at 09:07

## 2018-07-14 RX ADMIN — ATENOLOL 50 MG: 25 TABLET ORAL at 09:07

## 2018-07-14 RX ADMIN — IPRATROPIUM BROMIDE AND ALBUTEROL SULFATE 3 ML: .5; 3 SOLUTION RESPIRATORY (INHALATION) at 08:07

## 2018-07-14 RX ADMIN — FAMOTIDINE 20 MG: 20 TABLET ORAL at 08:07

## 2018-07-14 RX ADMIN — PREDNISONE 40 MG: 20 TABLET ORAL at 09:07

## 2018-07-14 RX ADMIN — AMLODIPINE BESYLATE 10 MG: 5 TABLET ORAL at 09:07

## 2018-07-14 NOTE — PLAN OF CARE
Problem: Patient Care Overview  Goal: Plan of Care Review  Outcome: Ongoing (interventions implemented as appropriate)  Pt on 5L NC. Sats 95%. No distress noted. Aerosol tx tolerated well. Will continue to monitor.

## 2018-07-14 NOTE — ASSESSMENT & PLAN NOTE
- most recent blood pressure reading BP: (!) 191/86   - continue home meds  - hydralazine PRN for SBP > 170  - monitor

## 2018-07-14 NOTE — ED NOTES
Pt states she felt better after the treatment but is feeling more SOB again. MD notified pts O2% on 2 Liters, pt increased to 4 L nasal cannula. Pt does not need restroom at this time, will continue to monitor.

## 2018-07-14 NOTE — ASSESSMENT & PLAN NOTE
- hypervolemic hyponatremia  -  followed by Dr. Hernández   - [facility] 2/2 h/o SIADH & HF   - baseline appears to be ~129 per available labs   - talked to renal, give a dose of tolvaptan today and monitor labs and diuresis  - d/c fluid restriction

## 2018-07-14 NOTE — CONSULTS
Consult Note  Nephrology    Consult Requested By: Leilani Wright MD  Reason for Consult: Hyponatremia    SUBJECTIVE:     History of Present Illness:  Patient is a 67 y.o. female presents with worsening SOB over last 4 days. She has has worsening edema over last 2 months , but she feels this has not changed. She is not following ANY dietary or fluid/water restrictions!!! She has history of chronic hyponatremia followed by Dr. Hernádnez and had serum Na of 134 on 7/6 she had been started on Torsemide 5mg daily at end of May for new onset edema. She denies any new med changes since addition of Torsemide in May.She is still smoking.    Past Medical History:   Diagnosis Date    Allergy     Anemia due to gastrointestinal blood loss     LESLY from gastric ulcer due to chronic nsaid use    Anxiety     Arthritis     CKD (chronic kidney disease) stage 3, GFR 30-59 ml/min     followed by [facility] study - recieved labs from 2017 and 2/2018    Gastric ulcer     H/O knee surgery 2001    right    Hx of psychiatric care     Hyperlipidemia     Hypertension     Hyponatremia     Psychiatric problem     Therapy     Tobacco abuse      Past Surgical History:   Procedure Laterality Date    COLONOSCOPY  2015    ESOPHAGOGASTRODUODENOSCOPY  2015    FRACTURE SURGERY      left femur    JOINT REPLACEMENT  2007    left knee x 2, 2nd performed 2/2 to MRSA infection 3 months after 1st surgery    ORIF FEMUR FRACTURE Left     TUBAL LIGATION       Family History   Problem Relation Age of Onset    Hypertension Mother     Alzheimer's disease Mother     Dementia Mother     Depression Mother     Myasthenia gravis Father     Arthritis Father     Rectal cancer Father     Hypertension Brother     Arthritis Brother     Colon cancer Brother     No Known Problems Son     Cancer Sister         brain    Melanoma Neg Hx     Breast cancer Neg Hx      Social History   Substance Use Topics    Smoking status: Current Every Day Smoker      Packs/day: 1.50     Last attempt to quit: 2/3/1967    Smokeless tobacco: Never Used    Alcohol use Yes      Comment: socially, last had cocktails 2-3 weeks ago        Prescriptions Prior to Admission   Medication Sig Dispense Refill Last Dose    albuterol (VENTOLIN HFA) 90 mcg/actuation inhaler inhale 1-2 puffs as needed every 6 hours for wheezing and shortness of breath 18 g 11 7/13/2018    alendronate (FOSAMAX) 70 MG tablet Take 1 tablet (70 mg total) by mouth every 7 days. on Wednesday 12 tablet 3 Past Month    amLODIPine (NORVASC) 10 MG tablet TAKE 1 TABLET BY MOUTH ONCE DAILY 90 tablet 3 7/13/2018    atenolol (TENORMIN) 50 MG tablet Take 1 tablet (50 mg total) by mouth once daily. 90 tablet 3 7/13/2018    atorvastatin (LIPITOR) 40 MG tablet TAKE 1 TABLET BY MOUTH EVERY DAILY 90 tablet 1 7/13/2018    calcium carbonate (CALTRATE 600) 600 mg (1,500 mg) Tab Take 1 tablet (600 mg total) by mouth 2 (two) times daily with meals.  0 7/12/2018    cyanocobalamin 1,000 mcg TbSR Take 1,000 mcg by mouth once daily. 90 tablet 1 7/12/2018    famotidine (PEPCID) 20 MG tablet TAKE 1 TABLET BY MOUTH TWO TIMES A  tablet 3 7/13/2018    fluticasone-salmeterol (ADVAIR HFA) 115-21 mcg/actuation HFAA Inhale 2 puffs into the lungs every 12 (twelve) hours. 36 g 3 7/13/2018    magnesium oxide 400 mg Cap Take 1 capsule by mouth once daily.   7/12/2018    torsemide (DEMADEX) 5 MG Tab Take 1 tablet (5 mg total) by mouth once daily. 90 tablet 3 7/13/2018    umeclidinium 62.5 mcg/actuation DsDv Inhale 1 puff into the lungs once daily. Controller 90 each 3 7/13/2018    HYDROcodone-acetaminophen (NORCO)  mg per tablet Take 1 tablet by mouth every 12 hours as needed for pain 60 tablet 0     ketorolac 0.5% (ACULAR) 0.5 % Drop ketorolac 0.5 % eye drops   Not Taking    walker (ULTRA-LIGHT ROLLATOR) Misc 1 each by Misc.(Non-Drug; Combo Route) route once daily. 1 each 0 Taking       Review of patient's allergies  indicates:   Allergen Reactions    Wellbutrin [bupropion hcl] Other (See Comments)     Hyponatremia and hypomagnesia     Zoloft [sertraline] Other (See Comments)     Hyponatremia and hypomagnesia         Review of Systems:  Constitutional: No fever or chills, no weight changes.  Eyes: No visual changes or photophobia  HEENT: No nasal congestion or sore throat  Respiratory: + cough + shorness of breath  Cardiovascular: No chest pain or palpitations  Gastrointestinal: Good appetite, no nausea or vomiting, no change in bowel habits  Genitourinary: No hematuria or dysuria  Skin: No rash or pruritis  Hematologic/lymphatic: No easy bruising, bleeding or lymphadenopathy  Musculoskeletal: No arthralgias or myalgias, ++++edema  Neurological: No seizures or tremors      OBJECTIVE:     Vital Signs (Most Recent)  Temp: 97.7 °F (36.5 °C) (07/14/18 0250)  Pulse: 83 (07/14/18 0817)  Resp: 20 (07/14/18 0817)  BP: (!) 170/94 (07/14/18 0620)  SpO2: (!) 94 % (07/14/18 0817)    Vital Signs Range (Last 24H):  Temp:  [97.5 °F (36.4 °C)-97.7 °F (36.5 °C)]   Pulse:  [75-99]   Resp:  [11-32]   BP: (159-191)/()   SpO2:  [85 %-95 %]     Physical Exam:    General appearance: OBese, A&O x 4, mild resp distress, smells like tobacco smoke  Head: Normocephalic, atraumatic  Eyes:  Conjunctivae nl. Sclera anicteric. PERRL.  HEENT: Lips, mucosa, and tongue normal; teeth and gums normal and oropharynx clear.  Neck: Supple, trachea midline, thyroid not enlarged,   Lungs: increased respiratory effort, diffuse wheezing and very poor air movement, edema left breast  Heart: slightly tachy, Regular  rhythm, S1, S2 normal, no murmur, click, rub or gallop  Abdomen: Soft, non-tender, Obese, bowel sounds normal, abdominal wall edema  Extremities: No cyanosis or clubbing. + anasarca  Pulses: 1+ and symmetric  Skin: Skin color, texture, turgor normal., few ecchymoses  Neurologic: Normal strength and tone. No focal numbness or weakness       Diagnostic  Results:  Labs: Reviewed  X-Ray: Reviewed  US: Reviewed    ASSESSMENT/PLAN:       1. Acute on chronic Hyponatremia with suspected underlying SIADH with acute volume overload (E87.1, E22.2): CT chest 2/18 had multiple ground glass nodules which need to be followed up. She had a serum Na of 134 on 7/6 and had been started on Torsemide 5mg daily by Dr. Hernández at the end of may for her new onset edema. Today she is Na 121. She is grossly volume overloaded with total body anasarca today. She has not been paying any attention to her diet or fluid intake. In the past she has responded very well to Tolvaptan thus will d/c diuretic and water restriction today and give a dose of Tolvaptan and re-dose based upon response. Monitor serum Na closely and will need midline or PICC to achieve this.  2. Edema (R60.0): Needs new echo and urine protein quatitation. Venous dopplers are negative for DVT. Recent liver U/S earlier this year was not impressive. Tolvaptan will help with fluid removal.  3. HTN (I10): uncontrolled. Hopefully will improve with aquaresis.  4. Acute exacerbation of COPD/Tobacco dependency (F17.200): Nicotine patch. Nebs, steroids and would add empiric ABX for exacerbation but defer to primary.  5. Thyroid nodule (E04.1): U/S noted. Previously had FNA recommended for 2.1cm nodule. Not sure this has been done. Defer to primary.       Thank you for consultation and we will follow along with you. Case discussed with Dr. Wright  [id number]

## 2018-07-14 NOTE — CONSULTS
HISTORY OF PRESENT ILLNESS:  Ms. Varma is a 67-year-old lady that presented to   the Emergency Room here with a history of progressively increasing shortness of   breath over the last four days.  She says that she has had a lot of swelling in   her arms, in her abdomen, in her thighs and in her lower legs over the last two   months.  She says she was first placed on Lasix and when the serum sodium level   had gone down, she was then switched to torsemide.  She says that she has been   careful with fluid restriction; however, she was not given instructions for any   strict fluid restriction at all.  She smokes one and a half pack of cigarettes a   day, smoked her last cigarette last night before coming to the hospital.    PAST HISTORY:  She was told she has advanced chronic obstructive lung disease.    She has a history of a gastric ulcer caused by nonsteroidal use.  She has had a   longstanding history of hypertension and hyperlipidemia.  She has stage III   chronic renal failure.  She says that recently, she was told that her potassium   levels have been elevated and she thinks that this is principally a dietary   phenomenon.  She has had previous left femur fracture and an ORIF for this.  She   has had a left knee joint replacement x2 in 2007.  She has had tubal ligation.    PHYSICAL EXAMINATION:  GENERAL:  Reveals an overweight lady, in mild respiratory distress.  VITAL SIGNS:  Blood pressure of 170/94 and a pulse of 85 beats per minute.  HEENT:  The sclerae is nonicteric.  The conjunctivae are pink.  The ENT exam is   unremarkable.  NECK:  Supple.  There is no jugular venous distention.  The carotid upstroke is   brisk.  There is no carotid bruit.  There is extensive bilateral rhonchi.  HEART:  Size is grossly normal.  S1 and S2 are normal.  There is no appreciable   murmur or gallop.  EXTREMITIES:  There is 4+ edema of the arms, the thighs and the lower legs.  NEUROLOGIC:  Grossly intact.    DIAGNOSTIC  STUDIES:  The electrocardiogram shows a right axis deviation.  She   has left bundle-branch block.  A QS pattern in V1 through V3 suggests a possible   anteroseptal scar.  Note that this is a longstanding pattern with her, and the   previous echocardiograms have failed to show evidence of an anteroseptal scar.    IMPRESSION:  1.  Advanced chronic obstructive lung disease with cor pulmonale.  2.  Right heart failure.  3.  Diuretic-induced hyponatremia.  4.  Essential hypertension, diastolic left ventricular dysfunction.  5.  Left bundle-branch block configuration on EKG.  Possible anteroseptal scar.    RECOMMENDATIONS:  We will watch the results of the Tolvaptan.  This will   hopefully help hyponatremia and result in some diuresis.  We will review the   echocardiogram.  She needs aggressive treatment for her bronchospasm.  She has   apparently been started on nebulizer and steroid.  We will follow along with   you.    Thank you for the opportunity of seeing Ms. Varma in consultation.      GODWIN/IN  dd: 07/14/2018 12:42:34 (CDT)  td: 07/14/2018 17:44:01 (CDT)  Doc ID   #[id number]  Job ID #[id number]    CC:

## 2018-07-14 NOTE — ASSESSMENT & PLAN NOTE
- followed by Dr. Hernández   - [facility] 2/2 h/o SIADH & HF   - baseline appears to be ~129 per available labs   - obtain repeat BMP after lasix administration in ED and begin slow NS infusion with Q$hour BMPs to monitor sodium   - nephrology consulted

## 2018-07-14 NOTE — SUBJECTIVE & OBJECTIVE
Past Medical History:   Diagnosis Date    Allergy     Anemia due to gastrointestinal blood loss     LESLY from gastric ulcer due to chronic nsaid use    Anxiety     Arthritis     CKD (chronic kidney disease) stage 3, GFR 30-59 ml/min     followed by [facility] study - recieved labs from 2017 and 2/2018    Gastric ulcer     H/O knee surgery 2001    right    Hx of psychiatric care     Hyperlipidemia     Hypertension     Hyponatremia     Psychiatric problem     Therapy     Tobacco abuse        Past Surgical History:   Procedure Laterality Date    COLONOSCOPY  2015    ESOPHAGOGASTRODUODENOSCOPY  2015    FRACTURE SURGERY      left femur    JOINT REPLACEMENT  2007    left knee x 2, 2nd performed 2/2 to MRSA infection 3 months after 1st surgery    ORIF FEMUR FRACTURE Left     TUBAL LIGATION         Review of patient's allergies indicates:   Allergen Reactions    Wellbutrin [bupropion hcl] Other (See Comments)     Hyponatremia and hypomagnesia     Zoloft [sertraline] Other (See Comments)     Hyponatremia and hypomagnesia        No current facility-administered medications on file prior to encounter.      Current Outpatient Prescriptions on File Prior to Encounter   Medication Sig    albuterol (VENTOLIN HFA) 90 mcg/actuation inhaler inhale 1-2 puffs as needed every 6 hours for wheezing and shortness of breath    alendronate (FOSAMAX) 70 MG tablet Take 1 tablet (70 mg total) by mouth every 7 days. on Wednesday    amLODIPine (NORVASC) 10 MG tablet TAKE 1 TABLET BY MOUTH ONCE DAILY    atenolol (TENORMIN) 50 MG tablet Take 1 tablet (50 mg total) by mouth once daily.    atorvastatin (LIPITOR) 40 MG tablet TAKE 1 TABLET BY MOUTH EVERY DAILY    calcium carbonate (CALTRATE 600) 600 mg (1,500 mg) Tab Take 1 tablet (600 mg total) by mouth 2 (two) times daily with meals.    cyanocobalamin 1,000 mcg TbSR Take 1,000 mcg by mouth once daily.    famotidine (PEPCID) 20 MG tablet TAKE 1 TABLET BY MOUTH TWO TIMES A  DAY    fluticasone-salmeterol (ADVAIR HFA) 115-21 mcg/actuation HFAA Inhale 2 puffs into the lungs every 12 (twelve) hours.    magnesium oxide 400 mg Cap Take 1 capsule by mouth once daily.    torsemide (DEMADEX) 5 MG Tab Take 1 tablet (5 mg total) by mouth once daily.    umeclidinium 62.5 mcg/actuation DsDv Inhale 1 puff into the lungs once daily. Controller    HYDROcodone-acetaminophen (NORCO)  mg per tablet Take 1 tablet by mouth every 12 hours as needed for pain    ketorolac 0.5% (ACULAR) 0.5 % Drop ketorolac 0.5 % eye drops    walker (ULTRA-LIGHT ROLLATOR) Misc 1 each by Misc.(Non-Drug; Combo Route) route once daily.    [DISCONTINUED] albuterol 90 mcg/actuation inhaler Inhale 2 puffs into the lungs every 6 (six) hours as needed for Wheezing.    [DISCONTINUED] nicotine (NICODERM CQ) 21 mg/24 hr Place 1 patch onto the skin once daily.    [DISCONTINUED] varenicline (CHANTIX) 1 mg Tab Chantix 1 mg tablet     Family History     Problem Relation (Age of Onset)    Alzheimer's disease Mother    Arthritis Father, Brother    Cancer Sister    Colon cancer Brother    Dementia Mother    Depression Mother    Hypertension Mother, Brother    Myasthenia gravis Father    No Known Problems Son    Rectal cancer Father        Social History Main Topics    Smoking status: Current Every Day Smoker     Packs/day: 1.50     Last attempt to quit: 2/3/1967    Smokeless tobacco: Never Used    Alcohol use Yes      Comment: socially, last had cocktails 2-3 weeks ago     Drug use: No    Sexual activity: Not Currently     Birth control/ protection: Abstinence     Review of Systems   Constitutional: Positive for activity change (decreased 2/2 increased SOB with activity) and unexpected weight change (increased weight of 30 lbs. ). Negative for chills, diaphoresis, fatigue and fever.   Respiratory: Positive for cough, shortness of breath and wheezing. Negative for chest tightness.    Cardiovascular: Positive for leg swelling  (b/l ). Negative for chest pain and palpitations.   Gastrointestinal: Negative for abdominal pain, diarrhea, nausea and vomiting.   Genitourinary: Negative for dysuria, flank pain, frequency, hematuria and urgency.   Musculoskeletal: Positive for joint swelling (left knee; chronic ). Negative for myalgias, neck pain and neck stiffness.   Skin: Negative for color change, pallor and wound.   Neurological: Negative for dizziness, light-headedness and headaches.   Psychiatric/Behavioral: Negative for confusion and decreased concentration.     Objective:     Vital Signs (Most Recent):  Temp: 97.7 °F (36.5 °C) (07/13/18 1854)  Pulse: 80 (07/13/18 2059)  Resp: (!) 24 (07/13/18 2049)  BP: (!) 191/86 (07/13/18 2043)  SpO2: (!) 94 % (07/13/18 2059) Vital Signs (24h Range):  Temp:  [97.7 °F (36.5 °C)] 97.7 °F (36.5 °C)  Pulse:  [75-84] 80  Resp:  [23-26] 24  SpO2:  [85 %-94 %] 94 %  BP: (186-191)/(86-91) 191/86     Weight: 115.7 kg (255 lb)  Body mass index is 41.16 kg/m².    Physical Exam   Constitutional: She is oriented to person, place, and time. She appears well-developed and well-nourished. No distress.   Obese female. Mild distress.    HENT:   Head: Normocephalic and atraumatic.   Eyes: Conjunctivae and EOM are normal. Pupils are equal, round, and reactive to light. No scleral icterus.   Neck: Normal range of motion. Neck supple. No JVD present. No tracheal deviation present.   Cardiovascular: Normal rate, regular rhythm, normal heart sounds and intact distal pulses.  Exam reveals no gallop and no friction rub.    No murmur heard.  2+ distal radial/DT/PT pulses. No carotid bruits.  B/l 2+ non-pitting LE edema; L>R.    Pulmonary/Chest: No stridor. She is in respiratory distress (mild). She has wheezes (b/l lung fields - mild with inspiration; moderate with expiration ). She has no rales.   Abdominal: Soft. Bowel sounds are normal. She exhibits no distension. There is no tenderness. There is no guarding.   Neurological:  She is alert and oriented to person, place, and time.   Skin: Skin is warm and dry. She is not diaphoretic.   Psychiatric: She has a normal mood and affect. Her behavior is normal. Judgment and thought content normal.   Nursing note and vitals reviewed.        CRANIAL NERVES     CN III, IV, VI   Pupils are equal, round, and reactive to light.  Extraocular motions are normal.        Significant Labs:   BMP:   Recent Labs  Lab 07/13/18 2133   *   *   K 4.0   CL 88*   CO2 21*   BUN 12   CREATININE 0.8   CALCIUM 9.0     CBC:   Recent Labs  Lab 07/13/18 1903 07/13/18  2321   WBC 11.20 11.31   HGB 11.4* 11.6*   HCT 33.4* 34.5*    281     CMP:   Recent Labs  Lab 07/13/18 1903 07/13/18  2133   * 120*   K 4.0 4.0   CL 89* 88*   CO2 20* 21*   * 122*   BUN 12 12   CREATININE 0.8 0.8   CALCIUM 8.8 9.0   PROT 6.8  --    ALBUMIN 3.4*  --    BILITOT 0.3  --    ALKPHOS 96  --    AST 15  --    ALT 14  --    ANIONGAP 12 11   EGFRNONAA >60 >60     All pertinent labs within the past 24 hours have been reviewed.    Significant Imaging: I have reviewed all pertinent imaging results/findings within the past 24 hours.   Imaging Results          US Lower Extremity Veins Bilateral (Final result)  Result time 07/13/18 22:26:00    Final result by Harry Gamez MD (07/13/18 22:26:00)                 Impression:      No evidence of deep venous thrombosis in either lower extremity.      Electronically signed by: Harry Gamez MD  Date:    07/13/2018  Time:    22:26             Narrative:    EXAMINATION:  US LOWER EXTREMITY VEINS BILATERAL    CLINICAL HISTORY:  Bilateral lower extremity swelling;    TECHNIQUE:  Duplex and color flow Doppler and dynamic compression was performed of the bilateral lower extremity veins was performed.    COMPARISON:  Lower extremity venous ultrasound, 07/06/2018.    FINDINGS:  Right thigh veins: The common femoral, femoral, popliteal, upper greater saphenous, and deep femoral  veins are patent and free of thrombus. The veins are normally compressible and have normal phasic flow and augmentation response.    Right calf veins: The visualized calf veins are patent.    Left thigh veins: The common femoral, femoral, popliteal, upper greater saphenous, and deep femoral veins are patent and free of thrombus. The veins are normally compressible and have normal phasic flow and augmentation response.    Left calf veins: The visualized calf veins are patent.    Mild lower extremity soft tissue edema is present.                               CTA Chest Non-Coronary (PE Study) (Final result)  Result time 07/13/18 20:56:55    Final result by Jatin Milligan MD (07/13/18 20:56:55)                 Impression:      No evidence of central pulmonary embolism.  Distal and subsegmental pulmonary emboli are not excluded.  Excoriation with DVT study and additional clinical parameters.    Cardiomegaly with small bilateral pleural effusions and thickening of the pulmonary interstitium.  The findings are consistent with decompensated CHF.    Multifocal airspace opacities as described above.  Follow-up to resolution is recommended.    Mediastinal and hilar lymphadenopathy as described above.    Left shoulder joint effusion adjacent to the left scapular fossa.  Follow-up with outpatient MRI of the left shoulder may be obtained for further evaluation.    Additional findings as above.      Electronically signed by: Jatin Milligan MD  Date:    07/13/2018  Time:    20:56             Narrative:    EXAMINATION:  CTA CHEST NON CORONARY    CLINICAL HISTORY:  Chest pain, acute, PE suspected, high pretest prob;    TECHNIQUE:  Low dose axial images, sagittal and coronal reformations were obtained from the thoracic inlet to the lung bases following the IV administration of 100 mL of Omnipaque 350.  Contrast timing was optimized to evaluate the pulmonary arteries.  MIP images were performed.    COMPARISON:  CT scan of the chest dated  02/08/2018 and chest x-ray dated 07/13/2018.    FINDINGS:  CT pulmonary embolism examination:    There are no filling defects within the central pulmonary tree to suggest pulmonary embolism.  The distal and subsegmental pulmonary tree are poorly visualized secondary to motion.  There is stable prominence of the main pulmonary arteries.    CT scan of the chest:    The thyroid gland is unremarkable.  The base of the neck is within normal limits.  The supraclavicular regions are unremarkable.    The trachea is within normal limits.  The central airways remain patent.  No endobronchial lesions identified.  There is no evidence of bronchiectasis.    The heart is enlarged.  There are no pericardial effusions.  There are extensive coronary artery calcifications.  There is no evidence of intracardiac thrombus.    The ascending thoracic aorta measures 2.7 cm.  The descending thoracic aorta measures 2.8 cm.  There are extensive calcifications along the course of the thoracic aorta.  There is no intimal flap to suggest dissection.    There is also a 1.4 x 1.3 cm subcarinal lymph node.  There is a 2.0 x 1.5 cm subcarinal lymph node.  There is a 1.7 x 1.1 cm right hilar lymph node.  There is a 1.6 x 1.5 cm left hilar lymph node.  Subcentimeter lymph nodes identified in the axillary regions.    There are new small bilateral simple appearing pleural effusions.  There is associated compressive atelectasis.  There is no evidence of a pneumothorax.  There is no evidence of pneumomediastinum.  There are multifocal airspace opacities in the right middle and right lower lobes.  Multifocal airspace opacities identified within the lingula and the left lower lobe.  There is thickening of the pulmonary interstitium.  There is a stable calcified 3.5 mm pleural based nodule in the left upper lobe.    The esophagus is unremarkable.  The partially visualized upper abdominal structures appear unremarkable.  There is no evidence of free air in  the upper abdomen.    The chest wall is unremarkable.  There are extensive degenerative changes in the osseous structures.  There is a stable 5.2 x 3.9 cm fluid adjacent to the left scapular fossa.                               X-Ray Chest 1 View (Final result)  Result time 07/13/18 20:07:22    Final result by Vashti Arenas MD (07/13/18 20:07:22)                 Impression:      Detrimental changes in the appearance of the chest radiograph since December 5, 2016 including increase in cardiac size, engorgement of the pulmonary vascularity, new bilateral pleural effusions, and increased pulmonary parenchymal opacification, most likely due to edema.      Electronically signed by: Vashti Arenas MD  Date:    07/13/2018  Time:    20:07             Narrative:    EXAMINATION:  XR CHEST 1 VIEW    CLINICAL HISTORY:  .    TECHNIQUE:  Single frontal portable view of the chest was performed.    COMPARISON:  December 5, 2016    FINDINGS:  Cardiac silhouette is enlarged.  Pulmonary vascularity is increased.  There are bilateral pleural effusions.  There is bilateral increased pulmonary parenchymal opacification with a mixed interstitial and alveolar pattern which could be due to edema given the clinical history of shortness of breath.  No pneumothorax is seen.  Visualized osseous structures are stable with degenerative changes of the spine and left shoulder.

## 2018-07-14 NOTE — CONSULTS
67yoWF with CHF and needs IV access.  1% lido  16cm TLC placed via right subclavian w/o difficulty.  CXR shows line in good position.  OK to use line.

## 2018-07-14 NOTE — PROGRESS NOTES
"[facility] Hospital Medicine  Progress Note    Patient Name: Nalini Varma  MRN: [id number]  Patient Class: IP- Inpatient   Admission Date: 7/13/2018  Length of Stay: 1 days  Attending Physician: Leilani Wright MD  Primary Care Provider: Yane Sahni MD        Subjective:     Principal Problem:Congestive heart failure (CHF)    HPI:  Ms. Nalini Varma is a 67 y.o. female, with PMH of COPD, Diastolic Dysfunction, Chronic Hyponatremia (2/2 SIADH, followed by Dr. Hernández), HTN, CKD, venous insufficiency, who presented to [facility] ED on 7/13/18 2/2 chronic SOB that acutely worsened today. She endorses a sensation of "choking all of the time," and states her SOB worsens if she walks 5' or more or even completes simple transfers from  Bed to bed. She additionally notes bilateral lower extremity swelling x 1 month, with 30 lb weight gain, that worsened in the LLE in the past 1 day. She states she has had increased production of sputum, with yellow color, when she coughs, which is all worse than her baseline. She denies fever, chills, sweats, chest pain, palpitations, use of home O2, abdominal pain, N/V/D, numbness, weakness, dizziness, light headedness, skin color change. She endorses compliance with her diuretic regimen at home.    The patient was evaluated in the ED for her symptoms. SOB evaluated with CXR which showed increased cardiomegaly, BNP elevation to 335, all in the presence of worsening lower extremity edema; consistent with worsening CHF given her h/o diastolic dysfunction. She was diuresed with 60 mg lasix. SOB also treated with Nebs, given her h/o COPD and continued tobacco use. Finally, labs showed a decrease from baseline of her Chronic Hyponatremia at 121, baseline appears to be ~129.     Hospital Course:  67 year old female with h/o chronic hyponatremia, copd, diastolic dysfunction came in with shortness of breath, generalized fluid overload in legs, arms, no worsening " cough or sputum production, no fever, started on demadex 5 mg daily about a month ago which she says she is compliant with.    Interval History: c/o shortness of breath and overall swelling, no chest pain, c/o chronic cough with yellow sputum production, no change in cough or fever.    Review of Systems   Constitutional: Negative for fever.   HENT: Positive for postnasal drip.    Respiratory: Positive for cough and shortness of breath.    Cardiovascular: Positive for leg swelling. Negative for chest pain.   Gastrointestinal: Positive for abdominal distention. Negative for blood in stool and vomiting.   Genitourinary: Negative for dysuria and hematuria.   Skin: Positive for rash.   Neurological: Positive for weakness.   Psychiatric/Behavioral: Negative for confusion.     Objective:     Vital Signs (Most Recent):  Temp: 97.7 °F (36.5 °C) (07/14/18 0250)  Pulse: 83 (07/14/18 0817)  Resp: 20 (07/14/18 0817)  BP: (!) 170/94 (07/14/18 0620)  SpO2: (!) 94 % (07/14/18 0817) Vital Signs (24h Range):  Temp:  [97.5 °F (36.4 °C)-97.7 °F (36.5 °C)] 97.7 °F (36.5 °C)  Pulse:  [75-99] 83  Resp:  [11-32] 20  SpO2:  [85 %-95 %] 94 %  BP: (159-191)/() 170/94     Weight: 124.6 kg (274 lb 11.1 oz)  Body mass index is 44.34 kg/m².    Intake/Output Summary (Last 24 hours) at 07/14/18 1049  Last data filed at 07/13/18 2250   Gross per 24 hour   Intake              150 ml   Output             1000 ml   Net             -850 ml      Physical Exam   Constitutional: She is oriented to person, place, and time.   obese   HENT:   Head: Normocephalic and atraumatic.   Eyes: EOM are normal. Pupils are equal, round, and reactive to light.   Neck: Normal range of motion. Neck supple.   Cardiovascular: Normal rate, regular rhythm and normal heart sounds.    Pulmonary/Chest:   Tachypnea, breath sounds decreased at bases, bilateral expiratory wheezing   Abdominal: Soft. Bowel sounds are normal.   Some abdominal wall edema, obese   Musculoskeletal:    2- 3 plus edema bilateral lower extremities, edema in bilateral arms left > right, erythema left forearm with weeping   Neurological: She is alert and oriented to person, place, and time.   Skin: Skin is warm. There is erythema.   Psychiatric: Her behavior is normal.       Significant Labs:   BMP:   Recent Labs  Lab 07/14/18  0440   *   *   K 4.4   CL 89*   CO2 23   BUN 13   CREATININE 0.9   CALCIUM 8.6*     CBC:   Recent Labs  Lab 07/13/18  1903 07/13/18  2321   WBC 11.20 11.31   HGB 11.4* 11.6*   HCT 33.4* 34.5*    281       Significant Imaging: I have reviewed all pertinent imaging results/findings within the past 24 hours.    Assessment/Plan:      * Congestive heart failure (CHF)    -patient with fluid overload , dvt bilateral lower extremities negative  -  patient has documented h/o diastolic dysfunction   - 2/26/16 ECHO with EF 60% and DD   - currently on BB, and diuretics    - with increasing b/l LE edema   - BNP is elevated at 335   - CXR with evidence of cardiomegaly   - s/p 60 mg lasix administered in ED   - strict I's & O's   - follow up ECHO   - Cardiology consulted   - give a dose of tolvaptan and monitor diuresis        Acute on Chronic Hyponatremia    - hypervolemic hyponatremia  -  followed by Dr. Hernández   - multifactorial 2/2 h/o SIADH & HF   - baseline appears to be ~129 per available labs   - talked to renal, give a dose of tolvaptan today and monitor labs and diuresis  - d/c fluid restriction        COPD exacerbation    - DuoNebs Q4 & Q4PRN  - no leukocytosis, fever, or increased sputum production at this time   - will hold on starting antibiotics ,symptoms likely due to fluid overload   - will continue with PO steroids   - PRN O2 to maintain O2 sats >88%   - monitor         Tobacco dependence      Nicotine patch daily, advised to quit.        Hypertension, benign    - continue home meds  - hydralazine PRN for SBP > 170  - monitor           VTE Risk Mitigation         Ordered      enoxaparin injection 40 mg  Daily      07/13/18 2313     IP VTE HIGH RISK PATIENT  Once      07/13/18 2313     Place RBEECCA hose  Until discontinued      07/13/18 2313     Place sequential compression device  Until discontinued      07/13/18 2313              Leilani Wright MD  Department of Hospital Medicine   [facility]

## 2018-07-14 NOTE — PLAN OF CARE
PCP is Yane Sahni. Pharmacy of choice is [facility]; [facility] [address]. Lives alone. States she is independent with ADL's. Uses rollator walker for DME. Needs transportation home upon discharge. Follow up needed.     07/13/18 2128   Discharge Assessment   Assessment Type Discharge Planning Assessment   Confirmed/corrected address and phone number on facesheet? Yes   Assessment information obtained from? Patient   Expected Length of Stay (days) 2   Communicated expected length of stay with patient/caregiver yes   Prior to hospitilization cognitive status: Alert/Oriented   Prior to hospitalization functional status: Assistive Equipment   Current cognitive status: Alert/Oriented   Current Functional Status: Assistive Equipment;Needs Assistance   Lives With alone   Able to Return to Prior Arrangements unable to determine at this time (comments)   Is patient able to care for self after discharge? Unable to determine at this time (comments)   Who are your caregiver(s) and their phone number(s)? Augie Dietrich (son) [phone]   Patient's perception of discharge disposition admitted as an inpatient;home or selfcare   Readmission Within The Last 30 Days no previous admission in last 30 days   Patient currently being followed by outpatient case management? No   Patient currently receives any other outside agency services? No   Equipment Currently Used at Home rollator   Do you have any problems affording any of your prescribed medications? No   Is the patient taking medications as prescribed? yes   Does the patient have transportation home? No   Does the patient receive services at the [facility]? No   Discharge Plan A Home;Home Health   Discharge Plan B Home   Patient/Family In Agreement With Plan yes

## 2018-07-14 NOTE — ASSESSMENT & PLAN NOTE
- DuoNebs Q4 & Q4PRN  - no leukocytosis, fever, or increased sputum production at this time   - will hold on starting antibiotics ,symptoms likely due to fluid overload   - will continue with PO steroids   - PRN O2 to maintain O2 sats >88%   - monitor

## 2018-07-14 NOTE — SUBJECTIVE & OBJECTIVE
Interval History: c/o shortness of breath and overall swelling, no chest pain, c/o chronic cough with yellow sputum production, no change in cough or fever.    Review of Systems   Constitutional: Negative for fever.   HENT: Positive for postnasal drip.    Respiratory: Positive for cough and shortness of breath.    Cardiovascular: Positive for leg swelling. Negative for chest pain.   Gastrointestinal: Positive for abdominal distention. Negative for blood in stool and vomiting.   Genitourinary: Negative for dysuria and hematuria.   Skin: Positive for rash.   Neurological: Positive for weakness.   Psychiatric/Behavioral: Negative for confusion.     Objective:     Vital Signs (Most Recent):  Temp: 97.7 °F (36.5 °C) (07/14/18 0250)  Pulse: 83 (07/14/18 0817)  Resp: 20 (07/14/18 0817)  BP: (!) 170/94 (07/14/18 0620)  SpO2: (!) 94 % (07/14/18 0817) Vital Signs (24h Range):  Temp:  [97.5 °F (36.4 °C)-97.7 °F (36.5 °C)] 97.7 °F (36.5 °C)  Pulse:  [75-99] 83  Resp:  [11-32] 20  SpO2:  [85 %-95 %] 94 %  BP: (159-191)/() 170/94     Weight: 124.6 kg (274 lb 11.1 oz)  Body mass index is 44.34 kg/m².    Intake/Output Summary (Last 24 hours) at 07/14/18 1049  Last data filed at 07/13/18 2250   Gross per 24 hour   Intake              150 ml   Output             1000 ml   Net             -850 ml      Physical Exam   Constitutional: She is oriented to person, place, and time.   obese   HENT:   Head: Normocephalic and atraumatic.   Eyes: EOM are normal. Pupils are equal, round, and reactive to light.   Neck: Normal range of motion. Neck supple.   Cardiovascular: Normal rate, regular rhythm and normal heart sounds.    Pulmonary/Chest:   Tachypnea, breath sounds decreased at bases, bilateral expiratory wheezing   Abdominal: Soft. Bowel sounds are normal.   Some abdominal wall edema, obese   Musculoskeletal:   2- 3 plus edema bilateral lower extremities, edema in bilateral arms left > right, erythema left forearm with weeping    Neurological: She is alert and oriented to person, place, and time.   Skin: Skin is warm. There is erythema.   Psychiatric: Her behavior is normal.       Significant Labs:   BMP:   Recent Labs  Lab 07/14/18  0440   *   *   K 4.4   CL 89*   CO2 23   BUN 13   CREATININE 0.9   CALCIUM 8.6*     CBC:   Recent Labs  Lab 07/13/18  1903 07/13/18  2321   WBC 11.20 11.31   HGB 11.4* 11.6*   HCT 33.4* 34.5*    281       Significant Imaging: I have reviewed all pertinent imaging results/findings within the past 24 hours.

## 2018-07-14 NOTE — H&P
"[facility] Medicine  History & Physical    Patient Name: Nalini Varma  MRN: [id number]  Admission Date: 7/13/2018  Attending Physician: Leilani Wright MD   Primary Care Provider: Yane Sahni MD         Patient information was obtained from patient, past medical records and ER records.     Subjective:     Principal Problem:COPD exacerbation    Chief Complaint:   Chief Complaint   Patient presents with    Shortness of Breath     Hx of COPD        HPI: Ms. Nalini Varma is a 67 y.o. female, with PMH of COPD, Diastolic Dysfunction, Chronic Hyponatremia (2/2 SIADH, followed by Dr. Hernández), HTN, CKD, venous insufficiency, who presented to [facility] ED on 7/13/18 2/2 chronic SOB that acutely worsened today. She endorses a sensation of "choking all of the time," and states her SOB worsens if she walks 5' or more or even completes simple transfers from  Bed to bed. She additionally notes bilateral lower extremity swelling x 1 month, with 30 lb weight gain, that worsened in the LLE in the past 1 day. She states she has had increased production of sputum, with yellow color, when she coughs, which is all worse than her baseline. She denies fever, chills, sweats, chest pain, palpitations, use of home O2, abdominal pain, N/V/D, numbness, weakness, dizziness, light headedness, skin color change. She endorses compliance with her diuretic regimen at home.    The patient was evaluated in the ED for her symptoms. SOB evaluated with CXR which showed increased cardiomegaly, BNP elevation to 335, all in the presence of worsening lower extremity edema; consistent with worsening CHF given her h/o diastolic dysfunction. She was diuresed with 60 mg lasix. SOB also treated with Nebs, given her h/o COPD and continued tobacco use. Finally, labs showed a decrease from baseline of her Chronic Hyponatremia at 121, baseline appears to be ~129.     Past Medical History:   Diagnosis Date    Allergy  "    Anemia due to gastrointestinal blood loss     LESLY from gastric ulcer due to chronic nsaid use    Anxiety     Arthritis     CKD (chronic kidney disease) stage 3, GFR 30-59 ml/min     followed by [facility] study - recieved labs from 2017 and 2/2018    Gastric ulcer     H/O knee surgery 2001    right    Hx of psychiatric care     Hyperlipidemia     Hypertension     Hyponatremia     Psychiatric problem     Therapy     Tobacco abuse        Past Surgical History:   Procedure Laterality Date    COLONOSCOPY  2015    ESOPHAGOGASTRODUODENOSCOPY  2015    FRACTURE SURGERY      left femur    JOINT REPLACEMENT  2007    left knee x 2, 2nd performed 2/2 to MRSA infection 3 months after 1st surgery    ORIF FEMUR FRACTURE Left     TUBAL LIGATION         Review of patient's allergies indicates:   Allergen Reactions    Wellbutrin [bupropion hcl] Other (See Comments)     Hyponatremia and hypomagnesia     Zoloft [sertraline] Other (See Comments)     Hyponatremia and hypomagnesia        No current facility-administered medications on file prior to encounter.      Current Outpatient Prescriptions on File Prior to Encounter   Medication Sig    albuterol (VENTOLIN HFA) 90 mcg/actuation inhaler inhale 1-2 puffs as needed every 6 hours for wheezing and shortness of breath    alendronate (FOSAMAX) 70 MG tablet Take 1 tablet (70 mg total) by mouth every 7 days. on Wednesday    amLODIPine (NORVASC) 10 MG tablet TAKE 1 TABLET BY MOUTH ONCE DAILY    atenolol (TENORMIN) 50 MG tablet Take 1 tablet (50 mg total) by mouth once daily.    atorvastatin (LIPITOR) 40 MG tablet TAKE 1 TABLET BY MOUTH EVERY DAILY    calcium carbonate (CALTRATE 600) 600 mg (1,500 mg) Tab Take 1 tablet (600 mg total) by mouth 2 (two) times daily with meals.    cyanocobalamin 1,000 mcg TbSR Take 1,000 mcg by mouth once daily.    famotidine (PEPCID) 20 MG tablet TAKE 1 TABLET BY MOUTH TWO TIMES A DAY    fluticasone-salmeterol (ADVAIR HFA) 115-21  mcg/actuation HFAA Inhale 2 puffs into the lungs every 12 (twelve) hours.    magnesium oxide 400 mg Cap Take 1 capsule by mouth once daily.    torsemide (DEMADEX) 5 MG Tab Take 1 tablet (5 mg total) by mouth once daily.    umeclidinium 62.5 mcg/actuation DsDv Inhale 1 puff into the lungs once daily. Controller    HYDROcodone-acetaminophen (NORCO)  mg per tablet Take 1 tablet by mouth every 12 hours as needed for pain    ketorolac 0.5% (ACULAR) 0.5 % Drop ketorolac 0.5 % eye drops    walker (ULTRA-LIGHT ROLLATOR) Misc 1 each by Misc.(Non-Drug; Combo Route) route once daily.    [DISCONTINUED] albuterol 90 mcg/actuation inhaler Inhale 2 puffs into the lungs every 6 (six) hours as needed for Wheezing.    [DISCONTINUED] nicotine (NICODERM CQ) 21 mg/24 hr Place 1 patch onto the skin once daily.    [DISCONTINUED] varenicline (CHANTIX) 1 mg Tab Chantix 1 mg tablet     Family History     Problem Relation (Age of Onset)    Alzheimer's disease Mother    Arthritis Father, Brother    Cancer Sister    Colon cancer Brother    Dementia Mother    Depression Mother    Hypertension Mother, Brother    Myasthenia gravis Father    No Known Problems Son    Rectal cancer Father        Social History Main Topics    Smoking status: Current Every Day Smoker     Packs/day: 1.50     Last attempt to quit: 2/3/1967    Smokeless tobacco: Never Used    Alcohol use Yes      Comment: socially, last had cocktails 2-3 weeks ago     Drug use: No    Sexual activity: Not Currently     Birth control/ protection: Abstinence     Review of Systems   Constitutional: Positive for activity change (decreased 2/2 increased SOB with activity) and unexpected weight change (increased weight of 30 lbs. ). Negative for chills, diaphoresis, fatigue and fever.   Respiratory: Positive for cough, shortness of breath and wheezing. Negative for chest tightness.    Cardiovascular: Positive for leg swelling (b/l ). Negative for chest pain and palpitations.    Gastrointestinal: Negative for abdominal pain, diarrhea, nausea and vomiting.   Genitourinary: Negative for dysuria, flank pain, frequency, hematuria and urgency.   Musculoskeletal: Positive for joint swelling (left knee; chronic ). Negative for myalgias, neck pain and neck stiffness.   Skin: Negative for color change, pallor and wound.   Neurological: Negative for dizziness, light-headedness and headaches.   Psychiatric/Behavioral: Negative for confusion and decreased concentration.     Objective:     Vital Signs (Most Recent):  Temp: 97.7 °F (36.5 °C) (07/13/18 1854)  Pulse: 80 (07/13/18 2059)  Resp: (!) 24 (07/13/18 2049)  BP: (!) 191/86 (07/13/18 2043)  SpO2: (!) 94 % (07/13/18 2059) Vital Signs (24h Range):  Temp:  [97.7 °F (36.5 °C)] 97.7 °F (36.5 °C)  Pulse:  [75-84] 80  Resp:  [23-26] 24  SpO2:  [85 %-94 %] 94 %  BP: (186-191)/(86-91) 191/86     Weight: 115.7 kg (255 lb)  Body mass index is 41.16 kg/m².    Physical Exam   Constitutional: She is oriented to person, place, and time. She appears well-developed and well-nourished. No distress.   Obese female. Mild distress.    HENT:   Head: Normocephalic and atraumatic.   Eyes: Conjunctivae and EOM are normal. Pupils are equal, round, and reactive to light. No scleral icterus.   Neck: Normal range of motion. Neck supple. No JVD present. No tracheal deviation present.   Cardiovascular: Normal rate, regular rhythm, normal heart sounds and intact distal pulses.  Exam reveals no gallop and no friction rub.    No murmur heard.  2+ distal radial/DT/PT pulses. No carotid bruits.  B/l 2+ non-pitting LE edema; L>R.    Pulmonary/Chest: No stridor. She is in respiratory distress (mild). She has wheezes (b/l lung fields - mild with inspiration; moderate with expiration ). She has no rales.   Abdominal: Soft. Bowel sounds are normal. She exhibits no distension. There is no tenderness. There is no guarding.   Neurological: She is alert and oriented to person, place, and  time.   Skin: Skin is warm and dry. She is not diaphoretic.   Psychiatric: She has a normal mood and affect. Her behavior is normal. Judgment and thought content normal.   Nursing note and vitals reviewed.        CRANIAL NERVES     CN III, IV, VI   Pupils are equal, round, and reactive to light.  Extraocular motions are normal.        Significant Labs:   BMP:   Recent Labs  Lab 07/13/18  2133   *   *   K 4.0   CL 88*   CO2 21*   BUN 12   CREATININE 0.8   CALCIUM 9.0     CBC:   Recent Labs  Lab 07/13/18 1903 07/13/18  2321   WBC 11.20 11.31   HGB 11.4* 11.6*   HCT 33.4* 34.5*    281     CMP:   Recent Labs  Lab 07/13/18 1903 07/13/18  2133   * 120*   K 4.0 4.0   CL 89* 88*   CO2 20* 21*   * 122*   BUN 12 12   CREATININE 0.8 0.8   CALCIUM 8.8 9.0   PROT 6.8  --    ALBUMIN 3.4*  --    BILITOT 0.3  --    ALKPHOS 96  --    AST 15  --    ALT 14  --    ANIONGAP 12 11   EGFRNONAA >60 >60     All pertinent labs within the past 24 hours have been reviewed.    Significant Imaging: I have reviewed all pertinent imaging results/findings within the past 24 hours.   Imaging Results          US Lower Extremity Veins Bilateral (Final result)  Result time 07/13/18 22:26:00    Final result by Harry Gamez MD (07/13/18 22:26:00)                 Impression:      No evidence of deep venous thrombosis in either lower extremity.      Electronically signed by: Harry Gamez MD  Date:    07/13/2018  Time:    22:26             Narrative:    EXAMINATION:  US LOWER EXTREMITY VEINS BILATERAL    CLINICAL HISTORY:  Bilateral lower extremity swelling;    TECHNIQUE:  Duplex and color flow Doppler and dynamic compression was performed of the bilateral lower extremity veins was performed.    COMPARISON:  Lower extremity venous ultrasound, 07/06/2018.    FINDINGS:  Right thigh veins: The common femoral, femoral, popliteal, upper greater saphenous, and deep femoral veins are patent and free of thrombus. The  veins are normally compressible and have normal phasic flow and augmentation response.    Right calf veins: The visualized calf veins are patent.    Left thigh veins: The common femoral, femoral, popliteal, upper greater saphenous, and deep femoral veins are patent and free of thrombus. The veins are normally compressible and have normal phasic flow and augmentation response.    Left calf veins: The visualized calf veins are patent.    Mild lower extremity soft tissue edema is present.                               CTA Chest Non-Coronary (PE Study) (Final result)  Result time 07/13/18 20:56:55    Final result by Jatin Milligan MD (07/13/18 20:56:55)                 Impression:      No evidence of central pulmonary embolism.  Distal and subsegmental pulmonary emboli are not excluded.  Excoriation with DVT study and additional clinical parameters.    Cardiomegaly with small bilateral pleural effusions and thickening of the pulmonary interstitium.  The findings are consistent with decompensated CHF.    Multifocal airspace opacities as described above.  Follow-up to resolution is recommended.    Mediastinal and hilar lymphadenopathy as described above.    Left shoulder joint effusion adjacent to the left scapular fossa.  Follow-up with outpatient MRI of the left shoulder may be obtained for further evaluation.    Additional findings as above.      Electronically signed by: Jatin Milligan MD  Date:    07/13/2018  Time:    20:56             Narrative:    EXAMINATION:  CTA CHEST NON CORONARY    CLINICAL HISTORY:  Chest pain, acute, PE suspected, high pretest prob;    TECHNIQUE:  Low dose axial images, sagittal and coronal reformations were obtained from the thoracic inlet to the lung bases following the IV administration of 100 mL of Omnipaque 350.  Contrast timing was optimized to evaluate the pulmonary arteries.  MIP images were performed.    COMPARISON:  CT scan of the chest dated 02/08/2018 and chest x-ray dated  07/13/2018.    FINDINGS:  CT pulmonary embolism examination:    There are no filling defects within the central pulmonary tree to suggest pulmonary embolism.  The distal and subsegmental pulmonary tree are poorly visualized secondary to motion.  There is stable prominence of the main pulmonary arteries.    CT scan of the chest:    The thyroid gland is unremarkable.  The base of the neck is within normal limits.  The supraclavicular regions are unremarkable.    The trachea is within normal limits.  The central airways remain patent.  No endobronchial lesions identified.  There is no evidence of bronchiectasis.    The heart is enlarged.  There are no pericardial effusions.  There are extensive coronary artery calcifications.  There is no evidence of intracardiac thrombus.    The ascending thoracic aorta measures 2.7 cm.  The descending thoracic aorta measures 2.8 cm.  There are extensive calcifications along the course of the thoracic aorta.  There is no intimal flap to suggest dissection.    There is also a 1.4 x 1.3 cm subcarinal lymph node.  There is a 2.0 x 1.5 cm subcarinal lymph node.  There is a 1.7 x 1.1 cm right hilar lymph node.  There is a 1.6 x 1.5 cm left hilar lymph node.  Subcentimeter lymph nodes identified in the axillary regions.    There are new small bilateral simple appearing pleural effusions.  There is associated compressive atelectasis.  There is no evidence of a pneumothorax.  There is no evidence of pneumomediastinum.  There are multifocal airspace opacities in the right middle and right lower lobes.  Multifocal airspace opacities identified within the lingula and the left lower lobe.  There is thickening of the pulmonary interstitium.  There is a stable calcified 3.5 mm pleural based nodule in the left upper lobe.    The esophagus is unremarkable.  The partially visualized upper abdominal structures appear unremarkable.  There is no evidence of free air in the upper abdomen.    The chest  wall is unremarkable.  There are extensive degenerative changes in the osseous structures.  There is a stable 5.2 x 3.9 cm fluid adjacent to the left scapular fossa.                               X-Ray Chest 1 View (Final result)  Result time 07/13/18 20:07:22    Final result by Vashti Arenas MD (07/13/18 20:07:22)                 Impression:      Detrimental changes in the appearance of the chest radiograph since December 5, 2016 including increase in cardiac size, engorgement of the pulmonary vascularity, new bilateral pleural effusions, and increased pulmonary parenchymal opacification, most likely due to edema.      Electronically signed by: Vashti Arenas MD  Date:    07/13/2018  Time:    20:07             Narrative:    EXAMINATION:  XR CHEST 1 VIEW    CLINICAL HISTORY:  .    TECHNIQUE:  Single frontal portable view of the chest was performed.    COMPARISON:  December 5, 2016    FINDINGS:  Cardiac silhouette is enlarged.  Pulmonary vascularity is increased.  There are bilateral pleural effusions.  There is bilateral increased pulmonary parenchymal opacification with a mixed interstitial and alveolar pattern which could be due to edema given the clinical history of shortness of breath.  No pneumothorax is seen.  Visualized osseous structures are stable with degenerative changes of the spine and left shoulder.                                 Assessment/Plan:     * COPD exacerbation    - DuoNebs Q4 & Q4PRN  - no leukocytosis, fever, or increased sputum production at this time    - will hold on starting antibiotics   - will continue with PO steroids   - PRN O2 to maintain O2 sats >88%   - monitor         Acute on Chronic Hyponatremia    - followed by Dr. Hernández   - jcarlos 2/2 h/o SIADH & HF   - baseline appears to be ~129 per available labs   - obtain repeat BMP after lasix administration in ED and begin slow NS infusion with Q$hour BMPs to monitor sodium   - nephrology consulted         Congestive  heart failure (CHF)    - patient has documented h/o diastolic dysfunction   - 2/26/16 ECHO with EF 60% and DD   - currently on BB, and diuretics    - with increasing b/l LE edema despite diuretics (suspect compliance issues)   - BNP is elevated at 335   - CXR with evidence of cardiomegaly   - s/p 60 mg lasix administered in ED   - strict I's & O's   - ECHO in AM   - Cardiology consulted         Hypertension, benign    - most recent blood pressure reading BP: (!) 191/86   - continue home meds  - hydralazine PRN for SBP > 170  - monitor           VTE Risk Mitigation         Ordered     enoxaparin injection 40 mg  Daily      07/13/18 2313     IP VTE HIGH RISK PATIENT  Once      07/13/18 2313     Place REBECCA hose  Until discontinued      07/13/18 2313     Place sequential compression device  Until discontinued      07/13/18 2313             ARIAN LeonardC  Department of Hospital Medicine   [facility]

## 2018-07-14 NOTE — ED NOTES
NEURO: Pt AAO x 4. Behavior and speech appropriate to situation.   CARDIAC: Regular rhythm auscultated  RESPIRATORY: Respirations even and labored. diminished breath sounds, inspiratory and expiratory wheezing, rhonchi to posterior lung fields   MUSCULOSKELETAL: Active ROM noted to extremities    Edema to bilateral UE, +4 pitting edema to bilateral LE. Edema noted to left suborbital.

## 2018-07-14 NOTE — PROGRESS NOTES
Patient seen in ED. Aerosol treatments given x 3, the ABG done and reported to Dr. Fritz. One hour continuous given. Shortness of breath reported. Wheezes present.

## 2018-07-14 NOTE — ASSESSMENT & PLAN NOTE
- DuoNebs Q4 & Q4PRN  - no leukocytosis, fever, or increased sputum production at this time    - will hold on starting antibiotics   - will continue with PO steroids   - PRN O2 to maintain O2 sats >88%   - monitor

## 2018-07-14 NOTE — ED TRIAGE NOTES
Pt presents with SOB onset a few days ago. Pt states edema for a couple months and on torsemide to treat. Pt states chronic SOB but has more SOB the past few days that significantly worsened tonight. Pt states she was walking with symptom onset tonight and couldnt breath walking from restaurant to car. Pt reports temporary relief of symptoms with albuterol inhaler but SOB resumed. Pt deneies any chest pain. Pt is able to speak in complete sentences. Pt smokes 1.5 ppd. PMH COPD, denies CHF. Pt not on home O2, currently 91% on RA. Pt is edematous. Pt in fowlers position. Allergy and fall band placed on pt. Pt verified with pt identifiers. Will continue to monitor.

## 2018-07-14 NOTE — PLAN OF CARE
Problem: Patient Care Overview  Goal: Plan of Care Review  Outcome: Ongoing (interventions implemented as appropriate)  Patient on 4L nc.  Sats 91 to 93%. Txs given. Pt. in no distress, will continue to monitor.

## 2018-07-14 NOTE — ASSESSMENT & PLAN NOTE
- patient has documented h/o diastolic dysfunction   - 2/26/16 ECHO with EF 60% and DD   - currently on BB, and diuretics    - with increasing b/l LE edema despite diuretics (suspect compliance issues)   - BNP is elevated at 335   - CXR with evidence of cardiomegaly   - s/p 60 mg lasix administered in ED   - strict I's & O's   - ECHO in AM   - Cardiology consulted

## 2018-07-14 NOTE — HPI
"Ms. Nalini Varma is a 67 y.o. female, with PMH of COPD, Diastolic Dysfunction, Chronic Hyponatremia (2/2 SIADH, followed by Dr. Hernández), HTN, CKD, venous insufficiency, who presented to [facility] ED on 7/13/18 2/2 chronic SOB that acutely worsened today. She endorses a sensation of "choking all of the time," and states her SOB worsens if she walks 5' or more or even completes simple transfers from  Bed to bed. She additionally notes bilateral lower extremity swelling x 1 month, with 30 lb weight gain, that worsened in the LLE in the past 1 day. She states she has had increased production of sputum, with yellow color, when she coughs, which is all worse than her baseline. She denies fever, chills, sweats, chest pain, palpitations, use of home O2, abdominal pain, N/V/D, numbness, weakness, dizziness, light headedness, skin color change. She endorses compliance with her diuretic regimen at home.  The patient was evaluated in the ED for her symptoms. SOB evaluated with CXR which showed increased cardiomegaly, BNP elevation to 335, all in the presence of worsening lower extremity edema; consistent with worsening CHF given her h/o diastolic dysfunction. She was diuresed with 60 mg lasix. SOB also treated with Nebs, given her h/o COPD and continued tobacco use. Finally, labs showed a decrease from baseline of her Chronic Hyponatremia at 121, baseline appears to be ~129.   "

## 2018-07-14 NOTE — HOSPITAL COURSE
67 year old female with chronic hyponatremia, copd, diastolic dysfunction came in with shortness of breath, generalized fluid overload in legs, arms, started on demadex 5 mg daily about a month ago and denies non-compliance.  She was given tolvaptan  and she had a good response with normalization of sodium.  The patient is responding to diuresis per Nephrology.  Pulmonary consulted and recommended PFTs which showed severe obstructive lung disease.   COPD exacerbation managed with steroids and bronchodilator treatments.  Cardiology to performed [facility] 7/20/18 which revealed normal coronaries and pulmonary hypertension.   The patient is strongly encouraged to cease tobacco use.  Her oxygen level decreased to 87% with activity and rest on room air so she will discharge with home oxygen therapy.

## 2018-07-14 NOTE — ASSESSMENT & PLAN NOTE
-patient with fluid overload , dvt bilateral lower extremities negative  -  patient has documented h/o diastolic dysfunction   - 2/26/16 ECHO with EF 60% and DD   - currently on BB, and diuretics    - with increasing b/l LE edema   - BNP is elevated at 335   - CXR with evidence of cardiomegaly   - s/p 60 mg lasix administered in ED   - strict I's & O's   - follow up ECHO   - Cardiology consulted   - give a dose of tolvaptan and monitor diuresis

## 2018-07-15 PROBLEM — J96.01 ACUTE HYPOXEMIC RESPIRATORY FAILURE: Status: ACTIVE | Noted: 2018-07-15

## 2018-07-15 PROBLEM — I50.31 ACUTE DIASTOLIC CONGESTIVE HEART FAILURE: Status: ACTIVE | Noted: 2018-07-13

## 2018-07-15 LAB
ANION GAP SERPL CALC-SCNC: 10 MMOL/L
ANION GAP SERPL CALC-SCNC: 7 MMOL/L
ANION GAP SERPL CALC-SCNC: 8 MMOL/L
BASOPHILS # BLD AUTO: 0 K/UL
BASOPHILS NFR BLD: 0 %
BUN SERPL-MCNC: 16 MG/DL
BUN SERPL-MCNC: 17 MG/DL
BUN SERPL-MCNC: 18 MG/DL
BUN SERPL-MCNC: 18 MG/DL
BUN SERPL-MCNC: 20 MG/DL
BUN SERPL-MCNC: 22 MG/DL
CALCIUM SERPL-MCNC: 8.5 MG/DL
CALCIUM SERPL-MCNC: 8.5 MG/DL
CALCIUM SERPL-MCNC: 8.6 MG/DL
CALCIUM SERPL-MCNC: 8.6 MG/DL
CALCIUM SERPL-MCNC: 8.7 MG/DL
CALCIUM SERPL-MCNC: 9.2 MG/DL
CHLORIDE SERPL-SCNC: 89 MMOL/L
CO2 SERPL-SCNC: 24 MMOL/L
CO2 SERPL-SCNC: 24 MMOL/L
CO2 SERPL-SCNC: 25 MMOL/L
CREAT SERPL-MCNC: 0.8 MG/DL
CREAT SERPL-MCNC: 0.9 MG/DL
DIFFERENTIAL METHOD: ABNORMAL
EOSINOPHIL # BLD AUTO: 0 K/UL
EOSINOPHIL NFR BLD: 0 %
ERYTHROCYTE [DISTWIDTH] IN BLOOD BY AUTOMATED COUNT: 14.1 %
EST. GFR  (AFRICAN AMERICAN): >60 ML/MIN/1.73 M^2
EST. GFR  (NON AFRICAN AMERICAN): >60 ML/MIN/1.73 M^2
FERRITIN SERPL-MCNC: 94 NG/ML
FOLATE SERPL-MCNC: 8.6 NG/ML
GLUCOSE SERPL-MCNC: 135 MG/DL
GLUCOSE SERPL-MCNC: 137 MG/DL
GLUCOSE SERPL-MCNC: 145 MG/DL
GLUCOSE SERPL-MCNC: 149 MG/DL
GLUCOSE SERPL-MCNC: 151 MG/DL
GLUCOSE SERPL-MCNC: 156 MG/DL
HCT VFR BLD AUTO: 31.3 %
HGB BLD-MCNC: 10.5 G/DL
IRON SERPL-MCNC: 33 UG/DL
LYMPHOCYTES # BLD AUTO: 0.7 K/UL
LYMPHOCYTES NFR BLD: 6.9 %
MAGNESIUM SERPL-MCNC: 1.7 MG/DL
MCH RBC QN AUTO: 30.3 PG
MCHC RBC AUTO-ENTMCNC: 33.5 G/DL
MCV RBC AUTO: 91 FL
MONOCYTES # BLD AUTO: 1.1 K/UL
MONOCYTES NFR BLD: 9.8 %
NEUTROPHILS # BLD AUTO: 8.9 K/UL
NEUTROPHILS NFR BLD: 82.9 %
PHOSPHATE SERPL-MCNC: 3.5 MG/DL
PLATELET # BLD AUTO: 235 K/UL
PMV BLD AUTO: 9.1 FL
POTASSIUM SERPL-SCNC: 4.4 MMOL/L
POTASSIUM SERPL-SCNC: 4.4 MMOL/L
POTASSIUM SERPL-SCNC: 4.5 MMOL/L
POTASSIUM SERPL-SCNC: 4.6 MMOL/L
POTASSIUM SERPL-SCNC: 4.7 MMOL/L
POTASSIUM SERPL-SCNC: 4.7 MMOL/L
RBC # BLD AUTO: 3.46 M/UL
SATURATED IRON: 10 %
SODIUM SERPL-SCNC: 121 MMOL/L
SODIUM SERPL-SCNC: 121 MMOL/L
SODIUM SERPL-SCNC: 122 MMOL/L
SODIUM SERPL-SCNC: 123 MMOL/L
TOTAL IRON BINDING CAPACITY: 324 UG/DL
TRANSFERRIN SERPL-MCNC: 219 MG/DL
VIT B12 SERPL-MCNC: 358 PG/ML
WBC # BLD AUTO: 10.78 K/UL

## 2018-07-15 PROCEDURE — 25000003 PHARM REV CODE 250: Performed by: PHYSICIAN ASSISTANT

## 2018-07-15 PROCEDURE — 94761 N-INVAS EAR/PLS OXIMETRY MLT: CPT

## 2018-07-15 PROCEDURE — 63600175 PHARM REV CODE 636 W HCPCS: Performed by: PHYSICIAN ASSISTANT

## 2018-07-15 PROCEDURE — 84100 ASSAY OF PHOSPHORUS: CPT

## 2018-07-15 PROCEDURE — 25000003 PHARM REV CODE 250: Performed by: INTERNAL MEDICINE

## 2018-07-15 PROCEDURE — 83735 ASSAY OF MAGNESIUM: CPT

## 2018-07-15 PROCEDURE — 25000242 PHARM REV CODE 250 ALT 637 W/ HCPCS: Performed by: INTERNAL MEDICINE

## 2018-07-15 PROCEDURE — 85025 COMPLETE CBC W/AUTO DIFF WBC: CPT

## 2018-07-15 PROCEDURE — 82728 ASSAY OF FERRITIN: CPT

## 2018-07-15 PROCEDURE — 36415 COLL VENOUS BLD VENIPUNCTURE: CPT

## 2018-07-15 PROCEDURE — S4991 NICOTINE PATCH NONLEGEND: HCPCS | Performed by: PHYSICIAN ASSISTANT

## 2018-07-15 PROCEDURE — 27000221 HC OXYGEN, UP TO 24 HOURS

## 2018-07-15 PROCEDURE — 80048 BASIC METABOLIC PNL TOTAL CA: CPT | Mod: 91

## 2018-07-15 PROCEDURE — 82607 VITAMIN B-12: CPT

## 2018-07-15 PROCEDURE — 94640 AIRWAY INHALATION TREATMENT: CPT

## 2018-07-15 PROCEDURE — 82746 ASSAY OF FOLIC ACID SERUM: CPT

## 2018-07-15 PROCEDURE — 83540 ASSAY OF IRON: CPT

## 2018-07-15 PROCEDURE — 11000001 HC ACUTE MED/SURG PRIVATE ROOM

## 2018-07-15 PROCEDURE — 63600175 PHARM REV CODE 636 W HCPCS: Performed by: INTERNAL MEDICINE

## 2018-07-15 RX ORDER — TOLVAPTAN 15 MG/1
30 TABLET ORAL DAILY
Status: DISCONTINUED | OUTPATIENT
Start: 2018-07-15 | End: 2018-07-16

## 2018-07-15 RX ORDER — GUAIFENESIN 600 MG/1
600 TABLET, EXTENDED RELEASE ORAL 2 TIMES DAILY
Status: DISCONTINUED | OUTPATIENT
Start: 2018-07-15 | End: 2018-07-17

## 2018-07-15 RX ORDER — METOPROLOL SUCCINATE 50 MG/1
50 TABLET, EXTENDED RELEASE ORAL DAILY
Status: DISCONTINUED | OUTPATIENT
Start: 2018-07-16 | End: 2018-07-21 | Stop reason: HOSPADM

## 2018-07-15 RX ORDER — METHYLPREDNISOLONE SOD SUCC 125 MG
80 VIAL (EA) INJECTION EVERY 8 HOURS
Status: DISCONTINUED | OUTPATIENT
Start: 2018-07-15 | End: 2018-07-17

## 2018-07-15 RX ORDER — LOSARTAN POTASSIUM 50 MG/1
100 TABLET ORAL DAILY
Status: DISCONTINUED | OUTPATIENT
Start: 2018-07-15 | End: 2018-07-21 | Stop reason: HOSPADM

## 2018-07-15 RX ORDER — FLUTICASONE PROPIONATE 50 MCG
2 SPRAY, SUSPENSION (ML) NASAL DAILY
Status: DISCONTINUED | OUTPATIENT
Start: 2018-07-15 | End: 2018-07-16

## 2018-07-15 RX ADMIN — IPRATROPIUM BROMIDE AND ALBUTEROL SULFATE 3 ML: .5; 3 SOLUTION RESPIRATORY (INHALATION) at 03:07

## 2018-07-15 RX ADMIN — IPRATROPIUM BROMIDE AND ALBUTEROL SULFATE 3 ML: .5; 3 SOLUTION RESPIRATORY (INHALATION) at 04:07

## 2018-07-15 RX ADMIN — ATENOLOL 50 MG: 25 TABLET ORAL at 08:07

## 2018-07-15 RX ADMIN — IPRATROPIUM BROMIDE AND ALBUTEROL SULFATE 3 ML: .5; 3 SOLUTION RESPIRATORY (INHALATION) at 12:07

## 2018-07-15 RX ADMIN — GUAIFENESIN 600 MG: 600 TABLET, EXTENDED RELEASE ORAL at 09:07

## 2018-07-15 RX ADMIN — HYDRALAZINE HYDROCHLORIDE 25 MG: 25 TABLET, FILM COATED ORAL at 06:07

## 2018-07-15 RX ADMIN — METHYLPREDNISOLONE SODIUM SUCCINATE 80 MG: 125 INJECTION, POWDER, FOR SOLUTION INTRAMUSCULAR; INTRAVENOUS at 10:07

## 2018-07-15 RX ADMIN — IPRATROPIUM BROMIDE AND ALBUTEROL SULFATE 3 ML: .5; 3 SOLUTION RESPIRATORY (INHALATION) at 07:07

## 2018-07-15 RX ADMIN — PREDNISONE 40 MG: 20 TABLET ORAL at 08:07

## 2018-07-15 RX ADMIN — ENOXAPARIN SODIUM 40 MG: 100 INJECTION SUBCUTANEOUS at 04:07

## 2018-07-15 RX ADMIN — AMLODIPINE BESYLATE 10 MG: 5 TABLET ORAL at 08:07

## 2018-07-15 RX ADMIN — HYDROCODONE BITARTRATE AND ACETAMINOPHEN 1 TABLET: 10; 325 TABLET ORAL at 07:07

## 2018-07-15 RX ADMIN — ATORVASTATIN CALCIUM 40 MG: 20 TABLET, FILM COATED ORAL at 08:07

## 2018-07-15 RX ADMIN — FLUTICASONE PROPIONATE 100 MCG: 50 SPRAY, METERED NASAL at 09:07

## 2018-07-15 RX ADMIN — METHYLPREDNISOLONE SODIUM SUCCINATE 80 MG: 125 INJECTION, POWDER, FOR SOLUTION INTRAMUSCULAR; INTRAVENOUS at 01:07

## 2018-07-15 RX ADMIN — FAMOTIDINE 20 MG: 20 TABLET ORAL at 09:07

## 2018-07-15 RX ADMIN — LOSARTAN POTASSIUM 100 MG: 50 TABLET, FILM COATED ORAL at 11:07

## 2018-07-15 RX ADMIN — NICOTINE 1 PATCH: 21 PATCH, EXTENDED RELEASE TRANSDERMAL at 08:07

## 2018-07-15 RX ADMIN — CEFTRIAXONE 1 G: 1 INJECTION, SOLUTION INTRAVENOUS at 10:07

## 2018-07-15 RX ADMIN — TOLVAPTAN 30 MG: 15 TABLET ORAL at 08:07

## 2018-07-15 RX ADMIN — FAMOTIDINE 20 MG: 20 TABLET ORAL at 08:07

## 2018-07-15 NOTE — ASSESSMENT & PLAN NOTE
- hypervolemic hyponatremia  -  followed by Dr. Hernández   - [facility] 2/2 h/o SIADH & HF   - baseline appears to be ~129 per available labs   - urine osmolality high for hyponatremia , urine sodium less than 20.  - tolvaptan 15 mg 7/14, 30 mg 7/15 , 45 mg 7/16 , monitor labs and diuresis  - d/cd fluid restriction

## 2018-07-15 NOTE — ASSESSMENT & PLAN NOTE
- DuoNebs Q4 & Q4PRN  - significant expiratory wheezing  - started iv solu medrol 80 mg q8 hr, add iss   - on empiric rocephin day 2  - mucinex bid  - flonase nasal spray bid, will also add cetirizine, robitussin dm  - PRN O2 to maintain O2 sats >88%   - monitor

## 2018-07-15 NOTE — NURSING TRANSFER
Nursing Transfer Note      7/15/2018     Transfer To: 324    Transfer via stretcher    Transfer with to O2, cardiac monitoring    Transported by LESLY Zepeda RN    Medicines sent: Yes    Chart send with patient: Yes    Notified: friend    Patient reassessed at: 7/15/18 @ 1500    Upon arrival to floor: cardiac monitor applied, patient oriented to room, call bell in reach and bed in lowest position

## 2018-07-15 NOTE — SUBJECTIVE & OBJECTIVE
Interval History: still   c/o shortness of breath and post nasal drip , breathing looks better ,  swelling in legs and arms some better, still some weeping from arms, not able to cough anything up  , c/o chest pain with coughing, no fever.    Review of Systems   Constitutional: Negative for fever.   HENT: Positive for postnasal drip.    Respiratory: Positive for cough and shortness of breath.    Cardiovascular: Positive for leg swelling. Negative for chest pain.   Gastrointestinal: Positive for abdominal distention. Negative for blood in stool and vomiting.   Genitourinary: Negative for dysuria and hematuria.   Skin: Positive for rash.   Neurological: Positive for weakness.   Psychiatric/Behavioral: Negative for confusion.     Objective:     Vital Signs (Most Recent):  Temp: 97.6 °F (36.4 °C) (07/15/18 0715)  Pulse: 74 (07/15/18 0900)  Resp: (!) 23 (07/15/18 0900)  BP: (!) 161/83 (07/15/18 0900)  SpO2: (!) 93 % (07/15/18 0900) Vital Signs (24h Range):  Temp:  [97.6 °F (36.4 °C)-98.5 °F (36.9 °C)] 97.6 °F (36.4 °C)  Pulse:  [68-82] 74  Resp:  [14-31] 23  SpO2:  [91 %-95 %] 93 %  BP: (154-182)/(75-99) 161/83     Weight: 124.6 kg (274 lb 11.1 oz)  Body mass index is 44.34 kg/m².    Intake/Output Summary (Last 24 hours) at 07/15/18 0937  Last data filed at 07/15/18 0800   Gross per 24 hour   Intake              720 ml   Output             1330 ml   Net             -610 ml      Physical Exam   Constitutional: She is oriented to person, place, and time.   obese   HENT:   Head: Normocephalic and atraumatic.   Eyes: EOM are normal. Pupils are equal, round, and reactive to light.   Neck: Normal range of motion. Neck supple.   Cardiovascular: Normal rate, regular rhythm and normal heart sounds.    Pulmonary/Chest:   Tachypnea improved, breath sounds decreased at bases, bilateral expiratory wheezing   Abdominal: Soft. Bowel sounds are normal.   Some abdominal wall edema, obese   Musculoskeletal:   2- 3 plus edema bilateral  lower extremities, edema in bilateral arms left > right, erythema left forearm with weeping   Neurological: She is alert and oriented to person, place, and time.   Skin: Skin is warm. There is erythema.   Erythema on left forearm   Psychiatric: Her behavior is normal.       Significant Labs:   BMP:     Recent Labs  Lab 07/15/18  0442   *   *   K 4.4   CL 89*   CO2 25   BUN 17   CREATININE 0.8   CALCIUM 8.6*   MG 1.7     CBC:     Recent Labs  Lab 07/13/18  1903 07/13/18  2321 07/15/18  0442   WBC 11.20 11.31 10.78   HGB 11.4* 11.6* 10.5*   HCT 33.4* 34.5* 31.3*    281 235       Significant Imaging: I have reviewed all pertinent imaging results/findings within the past 24 hours.

## 2018-07-15 NOTE — NURSING
Pt arrived from ICU.  AAOx4.  Denies pain.  Short of breath and tachypneic due to exertion of being transported.  Respiratory called and came to give pt a treatment.  VSS on 5L NC.  Archuleta intact draining clear yellow urine.  Central line c/d/i.  Friend at bedside.  Pt sitting up calmly in bed, no other complaints.  Will continue to monitor.

## 2018-07-15 NOTE — PLAN OF CARE
Problem: Patient Care Overview  Goal: Plan of Care Review  Outcome: Ongoing (interventions implemented as appropriate)  Patient on 4 liter nasal cannula.

## 2018-07-15 NOTE — NURSING
VSS. Pt. On 5L NC at 95%. Resting comfortably with no shortness of breathe reported. Will continue to monitor.

## 2018-07-15 NOTE — PROGRESS NOTES
"Says she feels better, less swelling. Thinks the breathing may be "worse".    Vitals:    07/15/18 0745 07/15/18 0800 07/15/18 0900 07/15/18 1000   BP:  (!) 172/89 (!) 161/83 (!) 173/91   Pulse: 82 80 74 78   Resp: (!) 28 (!) 24 (!) 23 19   Temp:       TempSrc:       SpO2: (!) 94% (!) 92% (!) 93% (!) 93%   Weight:       Height:         Chest scattered rhonchi.  Cor: no murmur or gallop  Edema arms and legs are less.    Na 121.    Refer echo report.    Tolvaptan has reduced the edema, hyponatremia and hypochloremia persists.  Bronchospasm persists. Received Methylprednisolone today IV q 8  Hypertension poorly controlled. Will switch Atenolol to Metoprolol and add ARB.  Once the hyponatremia and bronchospasm have improved, would consider coronary angio. (Dobutamine test 2 years ago showed ST depression, no segmental wall motion abnormalities)  F/U BMP, with increased Tolvaptan.  "

## 2018-07-15 NOTE — PLAN OF CARE
Problem: Patient Care Overview  Goal: Plan of Care Review  Outcome: Ongoing (interventions implemented as appropriate)  Patient on 5L nc. Sats 93 to 95%. Txs given. Pt. in no distress, will continue to monitor.

## 2018-07-15 NOTE — ASSESSMENT & PLAN NOTE
dvt bilateral lower extremities negative  -echo showed normal ef, severe left atrial enlargement with grade 2 diastolic dysfunction  With ? Wall motion abnormality  - BNP is elevated at 335   - CXR with evidence of cardiomegaly   - s/p 60 mg lasix administered in ED   - strict I's & O's   - Cardiology following , may be plan for angiogram later   - repeat tolvaptan 45 mg today , monitor diuresis

## 2018-07-16 LAB
ANION GAP SERPL CALC-SCNC: 10 MMOL/L
ANION GAP SERPL CALC-SCNC: 7 MMOL/L
ANION GAP SERPL CALC-SCNC: 7 MMOL/L
ANION GAP SERPL CALC-SCNC: 8 MMOL/L
BUN SERPL-MCNC: 22 MG/DL
BUN SERPL-MCNC: 23 MG/DL
BUN SERPL-MCNC: 23 MG/DL
BUN SERPL-MCNC: 29 MG/DL
CALCIUM SERPL-MCNC: 8.6 MG/DL
CALCIUM SERPL-MCNC: 8.6 MG/DL
CALCIUM SERPL-MCNC: 8.7 MG/DL
CALCIUM SERPL-MCNC: 8.9 MG/DL
CHLORIDE SERPL-SCNC: 90 MMOL/L
CHLORIDE SERPL-SCNC: 91 MMOL/L
CHLORIDE SERPL-SCNC: 92 MMOL/L
CHLORIDE SERPL-SCNC: 94 MMOL/L
CO2 SERPL-SCNC: 25 MMOL/L
CO2 SERPL-SCNC: 25 MMOL/L
CO2 SERPL-SCNC: 27 MMOL/L
CO2 SERPL-SCNC: 27 MMOL/L
CREAT SERPL-MCNC: 0.9 MG/DL
CREAT SERPL-MCNC: 1.1 MG/DL
EST. GFR  (AFRICAN AMERICAN): 60 ML/MIN/1.73 M^2
EST. GFR  (AFRICAN AMERICAN): >60 ML/MIN/1.73 M^2
EST. GFR  (NON AFRICAN AMERICAN): 52 ML/MIN/1.73 M^2
EST. GFR  (NON AFRICAN AMERICAN): >60 ML/MIN/1.73 M^2
GLUCOSE SERPL-MCNC: 133 MG/DL
GLUCOSE SERPL-MCNC: 137 MG/DL
GLUCOSE SERPL-MCNC: 145 MG/DL
GLUCOSE SERPL-MCNC: 217 MG/DL
POCT GLUCOSE: 224 MG/DL (ref 70–110)
POTASSIUM SERPL-SCNC: 4.3 MMOL/L
POTASSIUM SERPL-SCNC: 4.7 MMOL/L
POTASSIUM SERPL-SCNC: 4.7 MMOL/L
POTASSIUM SERPL-SCNC: 4.8 MMOL/L
SODIUM SERPL-SCNC: 123 MMOL/L
SODIUM SERPL-SCNC: 125 MMOL/L
SODIUM SERPL-SCNC: 126 MMOL/L
SODIUM SERPL-SCNC: 129 MMOL/L

## 2018-07-16 PROCEDURE — 27000221 HC OXYGEN, UP TO 24 HOURS

## 2018-07-16 PROCEDURE — 63600175 PHARM REV CODE 636 W HCPCS: Performed by: PHYSICIAN ASSISTANT

## 2018-07-16 PROCEDURE — 11000001 HC ACUTE MED/SURG PRIVATE ROOM

## 2018-07-16 PROCEDURE — 25000003 PHARM REV CODE 250: Performed by: NURSE PRACTITIONER

## 2018-07-16 PROCEDURE — 25000003 PHARM REV CODE 250: Performed by: PHYSICIAN ASSISTANT

## 2018-07-16 PROCEDURE — 80048 BASIC METABOLIC PNL TOTAL CA: CPT

## 2018-07-16 PROCEDURE — 25000242 PHARM REV CODE 250 ALT 637 W/ HCPCS: Performed by: INTERNAL MEDICINE

## 2018-07-16 PROCEDURE — 25000003 PHARM REV CODE 250: Performed by: INTERNAL MEDICINE

## 2018-07-16 PROCEDURE — 94761 N-INVAS EAR/PLS OXIMETRY MLT: CPT

## 2018-07-16 PROCEDURE — 99233 SBSQ HOSP IP/OBS HIGH 50: CPT | Mod: ,,, | Performed by: INTERNAL MEDICINE

## 2018-07-16 PROCEDURE — 80048 BASIC METABOLIC PNL TOTAL CA: CPT | Mod: 91

## 2018-07-16 PROCEDURE — 94640 AIRWAY INHALATION TREATMENT: CPT

## 2018-07-16 PROCEDURE — 63600175 PHARM REV CODE 636 W HCPCS: Performed by: INTERNAL MEDICINE

## 2018-07-16 PROCEDURE — S4991 NICOTINE PATCH NONLEGEND: HCPCS | Performed by: PHYSICIAN ASSISTANT

## 2018-07-16 RX ORDER — POLYETHYLENE GLYCOL 3350 17 G/17G
17 POWDER, FOR SOLUTION ORAL DAILY
Status: DISCONTINUED | OUTPATIENT
Start: 2018-07-16 | End: 2018-07-21 | Stop reason: HOSPADM

## 2018-07-16 RX ORDER — FLUTICASONE PROPIONATE 50 MCG
1 SPRAY, SUSPENSION (ML) NASAL 2 TIMES DAILY PRN
Status: ON HOLD | COMMUNITY
End: 2023-06-15

## 2018-07-16 RX ORDER — CALCIUM CARBONATE 500(1250)
2 TABLET,CHEWABLE ORAL DAILY PRN
COMMUNITY
End: 2019-02-06

## 2018-07-16 RX ORDER — ASPIRIN 81 MG/1
81 TABLET ORAL DAILY
Status: ON HOLD | COMMUNITY
End: 2020-09-04

## 2018-07-16 RX ORDER — IBUPROFEN 200 MG
16 TABLET ORAL
Status: DISCONTINUED | OUTPATIENT
Start: 2018-07-16 | End: 2018-07-21 | Stop reason: HOSPADM

## 2018-07-16 RX ORDER — CETIRIZINE HYDROCHLORIDE 10 MG/1
10 TABLET ORAL DAILY
Status: DISCONTINUED | OUTPATIENT
Start: 2018-07-16 | End: 2018-07-21 | Stop reason: HOSPADM

## 2018-07-16 RX ORDER — FERROUS SULFATE 325(65) MG
325 TABLET, DELAYED RELEASE (ENTERIC COATED) ORAL 2 TIMES DAILY
Status: DISCONTINUED | OUTPATIENT
Start: 2018-07-16 | End: 2018-07-21 | Stop reason: HOSPADM

## 2018-07-16 RX ORDER — GLUCAGON 1 MG
1 KIT INJECTION
Status: DISCONTINUED | OUTPATIENT
Start: 2018-07-16 | End: 2018-07-21 | Stop reason: HOSPADM

## 2018-07-16 RX ORDER — FLUTICASONE PROPIONATE 50 MCG
2 SPRAY, SUSPENSION (ML) NASAL 2 TIMES DAILY
Status: DISCONTINUED | OUTPATIENT
Start: 2018-07-16 | End: 2018-07-21 | Stop reason: HOSPADM

## 2018-07-16 RX ORDER — IBUPROFEN 200 MG
24 TABLET ORAL
Status: DISCONTINUED | OUTPATIENT
Start: 2018-07-16 | End: 2018-07-21 | Stop reason: HOSPADM

## 2018-07-16 RX ORDER — LORATADINE 10 MG/1
10 TABLET ORAL DAILY
COMMUNITY
End: 2019-11-19

## 2018-07-16 RX ORDER — INSULIN ASPART 100 [IU]/ML
0-5 INJECTION, SOLUTION INTRAVENOUS; SUBCUTANEOUS
Status: DISCONTINUED | OUTPATIENT
Start: 2018-07-16 | End: 2018-07-21 | Stop reason: HOSPADM

## 2018-07-16 RX ORDER — GUAIFENESIN/DEXTROMETHORPHAN 100-10MG/5
10 SYRUP ORAL EVERY 6 HOURS PRN
Status: DISCONTINUED | OUTPATIENT
Start: 2018-07-16 | End: 2018-07-21 | Stop reason: HOSPADM

## 2018-07-16 RX ORDER — TOLVAPTAN 15 MG/1
45 TABLET ORAL DAILY
Status: DISCONTINUED | OUTPATIENT
Start: 2018-07-16 | End: 2018-07-17

## 2018-07-16 RX ORDER — DOCUSATE SODIUM 100 MG/1
100 CAPSULE, LIQUID FILLED ORAL DAILY
Status: DISCONTINUED | OUTPATIENT
Start: 2018-07-16 | End: 2018-07-21 | Stop reason: HOSPADM

## 2018-07-16 RX ADMIN — IPRATROPIUM BROMIDE AND ALBUTEROL SULFATE 3 ML: .5; 3 SOLUTION RESPIRATORY (INHALATION) at 07:07

## 2018-07-16 RX ADMIN — INSULIN ASPART 1 UNITS: 100 INJECTION, SOLUTION INTRAVENOUS; SUBCUTANEOUS at 09:07

## 2018-07-16 RX ADMIN — FAMOTIDINE 20 MG: 20 TABLET ORAL at 08:07

## 2018-07-16 RX ADMIN — FLUTICASONE PROPIONATE 100 MCG: 50 SPRAY, METERED NASAL at 08:07

## 2018-07-16 RX ADMIN — LOSARTAN POTASSIUM 100 MG: 50 TABLET, FILM COATED ORAL at 08:07

## 2018-07-16 RX ADMIN — GUAIFENESIN 600 MG: 600 TABLET, EXTENDED RELEASE ORAL at 08:07

## 2018-07-16 RX ADMIN — TOLVAPTAN 45 MG: 15 TABLET ORAL at 09:07

## 2018-07-16 RX ADMIN — TOLVAPTAN 30 MG: 15 TABLET ORAL at 09:07

## 2018-07-16 RX ADMIN — NICOTINE 1 PATCH: 21 PATCH, EXTENDED RELEASE TRANSDERMAL at 08:07

## 2018-07-16 RX ADMIN — FLUTICASONE PROPIONATE 100 MCG: 50 SPRAY, METERED NASAL at 09:07

## 2018-07-16 RX ADMIN — FAMOTIDINE 20 MG: 20 TABLET ORAL at 09:07

## 2018-07-16 RX ADMIN — ENOXAPARIN SODIUM 40 MG: 100 INJECTION SUBCUTANEOUS at 05:07

## 2018-07-16 RX ADMIN — IPRATROPIUM BROMIDE AND ALBUTEROL SULFATE 3 ML: .5; 3 SOLUTION RESPIRATORY (INHALATION) at 12:07

## 2018-07-16 RX ADMIN — CEFTRIAXONE 1 G: 1 INJECTION, SOLUTION INTRAVENOUS at 10:07

## 2018-07-16 RX ADMIN — METOPROLOL SUCCINATE 50 MG: 50 TABLET, EXTENDED RELEASE ORAL at 08:07

## 2018-07-16 RX ADMIN — IPRATROPIUM BROMIDE AND ALBUTEROL SULFATE 3 ML: .5; 3 SOLUTION RESPIRATORY (INHALATION) at 04:07

## 2018-07-16 RX ADMIN — AMLODIPINE BESYLATE 10 MG: 5 TABLET ORAL at 08:07

## 2018-07-16 RX ADMIN — IPRATROPIUM BROMIDE AND ALBUTEROL SULFATE 3 ML: .5; 3 SOLUTION RESPIRATORY (INHALATION) at 11:07

## 2018-07-16 RX ADMIN — ATORVASTATIN CALCIUM 40 MG: 20 TABLET, FILM COATED ORAL at 08:07

## 2018-07-16 RX ADMIN — METHYLPREDNISOLONE SODIUM SUCCINATE 80 MG: 125 INJECTION, POWDER, FOR SOLUTION INTRAMUSCULAR; INTRAVENOUS at 05:07

## 2018-07-16 RX ADMIN — HYDROCODONE BITARTRATE AND ACETAMINOPHEN 1 TABLET: 10; 325 TABLET ORAL at 10:07

## 2018-07-16 RX ADMIN — GUAIFENESIN 600 MG: 600 TABLET, EXTENDED RELEASE ORAL at 09:07

## 2018-07-16 RX ADMIN — METHYLPREDNISOLONE SODIUM SUCCINATE 80 MG: 125 INJECTION, POWDER, FOR SOLUTION INTRAMUSCULAR; INTRAVENOUS at 02:07

## 2018-07-16 RX ADMIN — METHYLPREDNISOLONE SODIUM SUCCINATE 80 MG: 125 INJECTION, POWDER, FOR SOLUTION INTRAMUSCULAR; INTRAVENOUS at 09:07

## 2018-07-16 RX ADMIN — IPRATROPIUM BROMIDE AND ALBUTEROL SULFATE 3 ML: .5; 3 SOLUTION RESPIRATORY (INHALATION) at 08:07

## 2018-07-16 NOTE — PLAN OF CARE
Problem: Patient Care Overview  Goal: Plan of Care Review  Outcome: Ongoing (interventions implemented as appropriate)  Patient on 5L NC humidified saturations 92-93% with reported SOB,aerosol treatment given tolerated well. Will continue to monitor.

## 2018-07-16 NOTE — PHARMACY MED REC
"Admission Medication Reconciliation - Pharmacy Consult Note    The home medication history was taken by Tasha Walls.  Based on information gathered and subsequent review by the clinical pharmacist, the items below may need attention.     You may go to "Admission" then "Reconcile Home Medications" tabs to review and/or act upon these items.     Potentially problematic discrepancies with current MAR  o Patient IS taking the following which was not ordered upon admit  o Aspirin 81 mg PO daily    Prescriptions Prior to Admission   Medication Sig Dispense Refill Last Dose    albuterol (VENTOLIN HFA) 90 mcg/actuation inhaler inhale 1-2 puffs as needed every 6 hours for wheezing and shortness of breath 18 g 11 7/13/2018    alendronate (FOSAMAX) 70 MG tablet Take 1 tablet (70 mg total) by mouth every 7 days. on Wednesday 12 tablet 3 Past Month    amLODIPine (NORVASC) 10 MG tablet TAKE 1 TABLET BY MOUTH ONCE DAILY 90 tablet 3 7/13/2018    aspirin (ECOTRIN) 81 MG EC tablet Take 81 mg by mouth once daily.       atenolol (TENORMIN) 50 MG tablet Take 1 tablet (50 mg total) by mouth once daily. 90 tablet 3 7/13/2018    atorvastatin (LIPITOR) 40 MG tablet TAKE 1 TABLET BY MOUTH EVERY DAILY 90 tablet 1 7/13/2018    calcium carbonate (CALTRATE 600) 600 mg (1,500 mg) Tab Take 1 tablet (600 mg total) by mouth 2 (two) times daily with meals. (Patient taking differently: Take 600 mg by mouth 2 (two) times daily with meals. As needed)  0 7/12/2018    calcium carbonate (OS-SANDRINE) 500 mg calcium (1,250 mg) chewable tablet Take 2 tablets by mouth daily as needed for Heartburn.       cyanocobalamin 1,000 mcg TbSR Take 1,000 mcg by mouth once daily. (Patient taking differently: Take 1,000 mcg by mouth daily as needed. ) 90 tablet 1 7/12/2018    famotidine (PEPCID) 20 MG tablet TAKE 1 TABLET BY MOUTH TWO TIMES A  tablet 3 7/13/2018    fluticasone (FLONASE) 50 mcg/actuation nasal spray 1 spray by Each Nare route 2 (two) " times daily as needed for Rhinitis.       fluticasone-salmeterol (ADVAIR HFA) 115-21 mcg/actuation HFAA Inhale 2 puffs into the lungs every 12 (twelve) hours. 36 g 3 7/13/2018    HYDROcodone-acetaminophen (NORCO)  mg per tablet Take 1 tablet by mouth every 12 hours as needed for pain 60 tablet 0     loratadine (CLARITIN) 10 mg tablet Take 10 mg by mouth once daily.       magnesium oxide 400 mg Cap Take 1 capsule by mouth once daily.   7/12/2018    torsemide (DEMADEX) 5 MG Tab Take 1 tablet (5 mg total) by mouth once daily. 90 tablet 3 7/13/2018    umeclidinium 62.5 mcg/actuation DsDv Inhale 1 puff into the lungs once daily. Controller 90 each 3 7/13/2018    walker (ULTRA-LIGHT ROLLATOR) Misc 1 each by Misc.(Non-Drug; Combo Route) route once daily. 1 each 0 Taking       Please address this information as you see fit.  Feel free to contact us if you have any questions or require assistance.    Toño Brar, Pharm.D., BCPS  [phone]                .  .

## 2018-07-16 NOTE — PROGRESS NOTES
Progress Note  Nephrology    Admit Date: 7/13/2018   LOS: 3 days     SUBJECTIVE:     Follow-up For:  Hyponatremia    Interval History:    Still SOB/Wheezing this am.  Labs improving but still with edema.  Smells of tobacco.  No CP.  Discussed with attending.         OBJECTIVE:     Vital Signs Range  Temp:  [97.5 °F (36.4 °C)-98.3 °F (36.8 °C)]   Pulse:  [68-89]   Resp:  [16-28]   BP: (139-175)/()   SpO2:  [92 %-96 %]     I & O (Last 24H):    Intake/Output Summary (Last 24 hours) at 07/16/18 1006  Last data filed at 07/16/18 0800   Gross per 24 hour   Intake              410 ml   Output             1835 ml   Net            -1425 ml       Physical Exam:  General appearance: obese.   Eyes:  Conjunctivae/corneas clear. PERRL.  Lungs: tachypnea and wheezing throughout.   Heart: Regular rate and rhythm, S1, S2 normal, no murmur, rub or dionte. TLC Rt SC  Abdomen: Soft, non-tender non-distended; bowel sounds normal; no masses,  no organomegaly  Extremities: No cyanosis or clubbing. anasarca edema.    Skin: Skin color, texture, turgor normal. No rashes or lesions  Neurologic: Normal strength and tone. No focal numbness or weakness   pablo    Laboratory Data:  Reviewed and noted  where applicable- Please see chart for full lab data.    Medications:  Medication list was reviewed and changes noted under Assessment/Plan.    ASSESSMENT/PLAN:        1. Improving Acute on chronic Hyponatremia with suspected underlying SIADH with acute volume overload (E87.1, E22.2): CT chest 2/18 had multiple ground glass nodules which need to be followed up. She had a serum Na of 134 on 7/6 and had been started on Torsemide 5mg daily by Dr. Hernández at the end of may for her new onset edema. She has not been paying any attention to her diet or fluid intake increase  - Na improving with tolvaptan.   - She remains grossly volume overloaded with total body anasarca today.   - Increase Tolvaptan to 45 mg qam and monitor response.   2. Acute on  Chronic Laura CHF with wall motion abnls/Edema (R60.0): Echo noted. No significant urinary protein. Venous dopplers are negative for DVT. Recent liver U/S earlier this year was not impressive. Tolvaptan will help with fluid removal. Plans for CV angio noted. Defer to Cards.  3. HTN (I10): better overall.   4. Acute exacerbation of COPD/Tobacco dependency (F17.200): Nicotine patch. Nebs, steroids, empiric ABX for exacerbation.  Consider pulm eval.    5. Thyroid nodule (E04.1): U/S noted. Previously had FNA recommended for 2.1cm nodule. Not sure this has been done. Defer to primary.   6. Anemia: Likely AOCD.  Mild LESLY.  Oral iron for now.     See above  Will follow along.     Addendum:  Pt seen again this afternoon and doing better. Able to lay fairly flat in bed. Still wheezing, but much better air movement. Continue current plan. Chem q12h

## 2018-07-16 NOTE — PROGRESS NOTES
"[facility] Hospital Medicine  Progress Note    Patient Name: Nalini Varma  MRN: [id number]  Patient Class: IP- Inpatient   Admission Date: 7/13/2018  Length of Stay: 3 days  Attending Physician: Leilani Wright MD  Primary Care Provider: Yaen Sahni MD        Subjective:     Principal Problem:Acute diastolic congestive heart failure    HPI:  Ms. Nalini Varma is a 67 y.o. female, with PMH of COPD, Diastolic Dysfunction, Chronic Hyponatremia (2/2 SIADH, followed by Dr. Hernández), HTN, CKD, venous insufficiency, who presented to [facility] ED on 7/13/18 2/2 chronic SOB that acutely worsened today. She endorses a sensation of "choking all of the time," and states her SOB worsens if she walks 5' or more or even completes simple transfers from  Bed to bed. She additionally notes bilateral lower extremity swelling x 1 month, with 30 lb weight gain, that worsened in the LLE in the past 1 day. She states she has had increased production of sputum, with yellow color, when she coughs, which is all worse than her baseline. She denies fever, chills, sweats, chest pain, palpitations, use of home O2, abdominal pain, N/V/D, numbness, weakness, dizziness, light headedness, skin color change. She endorses compliance with her diuretic regimen at home.    The patient was evaluated in the ED for her symptoms. SOB evaluated with CXR which showed increased cardiomegaly, BNP elevation to 335, all in the presence of worsening lower extremity edema; consistent with worsening CHF given her h/o diastolic dysfunction. She was diuresed with 60 mg lasix. SOB also treated with Nebs, given her h/o COPD and continued tobacco use. Finally, labs showed a decrease from baseline of her Chronic Hyponatremia at 121, baseline appears to be ~129.     Hospital Course:  67 year old female with h/o chronic hyponatremia, copd, diastolic dysfunction came in with shortness of breath, generalized fluid overload in legs, arms, no " worsening cough or sputum production, no fever, started on demadex 5 mg daily about a month ago which she says she is compliant with.She wa given 15 mg tolvaptan on 7/14.Patieny diuresed and feels swelling in legs better but still hyponatremic.Tolvaptan 30 mg 7/15/18.    Interval History: still   c/o shortness of breath and post nasal drip , breathing looks better ,  swelling in legs and arms some better, still some weeping from arms, not able to cough anything up  , c/o chest pain with coughing, no fever.    Review of Systems   Constitutional: Negative for fever.   HENT: Positive for postnasal drip.    Respiratory: Positive for cough and shortness of breath.    Cardiovascular: Positive for leg swelling. Negative for chest pain.   Gastrointestinal: Positive for abdominal distention. Negative for blood in stool and vomiting.   Genitourinary: Negative for dysuria and hematuria.   Skin: Positive for rash.   Neurological: Positive for weakness.   Psychiatric/Behavioral: Negative for confusion.     Objective:     Vital Signs (Most Recent):  Temp: 97.6 °F (36.4 °C) (07/15/18 0715)  Pulse: 74 (07/15/18 0900)  Resp: (!) 23 (07/15/18 0900)  BP: (!) 161/83 (07/15/18 0900)  SpO2: (!) 93 % (07/15/18 0900) Vital Signs (24h Range):  Temp:  [97.6 °F (36.4 °C)-98.5 °F (36.9 °C)] 97.6 °F (36.4 °C)  Pulse:  [68-82] 74  Resp:  [14-31] 23  SpO2:  [91 %-95 %] 93 %  BP: (154-182)/(75-99) 161/83     Weight: 124.6 kg (274 lb 11.1 oz)  Body mass index is 44.34 kg/m².    Intake/Output Summary (Last 24 hours) at 07/15/18 0937  Last data filed at 07/15/18 0800   Gross per 24 hour   Intake              720 ml   Output             1330 ml   Net             -610 ml      Physical Exam   Constitutional: She is oriented to person, place, and time.   obese   HENT:   Head: Normocephalic and atraumatic.   Eyes: EOM are normal. Pupils are equal, round, and reactive to light.   Neck: Normal range of motion. Neck supple.   Cardiovascular: Normal rate,  regular rhythm and normal heart sounds.    Pulmonary/Chest:   Tachypnea improved, breath sounds decreased at bases, bilateral expiratory wheezing   Abdominal: Soft. Bowel sounds are normal.   Some abdominal wall edema, obese   Musculoskeletal:   2- 3 plus edema bilateral lower extremities, edema in bilateral arms left > right, erythema left forearm with weeping   Neurological: She is alert and oriented to person, place, and time.   Skin: Skin is warm. There is erythema.   Erythema on left forearm   Psychiatric: Her behavior is normal.       Significant Labs:   BMP:     Recent Labs  Lab 07/15/18  0442   *   *   K 4.4   CL 89*   CO2 25   BUN 17   CREATININE 0.8   CALCIUM 8.6*   MG 1.7     CBC:     Recent Labs  Lab 07/13/18  1903 07/13/18  2321 07/15/18  0442   WBC 11.20 11.31 10.78   HGB 11.4* 11.6* 10.5*   HCT 33.4* 34.5* 31.3*    281 235       Significant Imaging: I have reviewed all pertinent imaging results/findings within the past 24 hours.    Assessment/Plan:      * Acute diastolic congestive heart failure    dvt bilateral lower extremities negative  -echo showed normal ef, severe left atrial enlargement with grade 2 diastolic dysfunction  With ? Wall motion abnormality  - BNP is elevated at 335   - CXR with evidence of cardiomegaly   - s/p 60 mg lasix administered in ED   - strict I's & O's   - Cardiology following , may be plan for angiogram later   - repeat tolvaptan 45 mg today , monitor diuresis          Acute on Chronic Hyponatremia    - hypervolemic hyponatremia  -  followed by Dr. Hernández   - multifactorial 2/2 h/o SIADH & HF   - baseline appears to be ~129 per available labs   - urine osmolality high for hyponatremia , urine sodium less than 20.  - tolvaptan 15 mg 7/14, 30 mg 7/15 , 45 mg 7/16 , monitor labs and diuresis  - d/cd fluid restriction        Acute hypoxemic respiratory failure    Secondary to copd and fluid overload, o2 as needed to keep sats around 92%          COPD  exacerbation    - DuoNebs Q4 & Q4PRN  - significant expiratory wheezing  - started iv solu medrol 80 mg q8 hr, add iss   - on empiric rocephin day 2  - mucinex bid  - flonase nasal spray bid, will also add cetirizine, robitussin dm  - PRN O2 to maintain O2 sats >88%   - monitor         Tobacco dependence      Nicotine patch daily, advised to quit.        Hypertension, benign    - continue home meds  - hydralazine PRN for SBP > 170  - monitor           VTE Risk Mitigation         Ordered     enoxaparin injection 40 mg  Daily      07/13/18 2313     IP VTE HIGH RISK PATIENT  Once      07/13/18 2313     Place REBECCA hose  Until discontinued      07/13/18 2313     Place sequential compression device  Until discontinued      07/13/18 2313              Leilani Wright MD  Department of Hospital Medicine   [facility]

## 2018-07-16 NOTE — PROGRESS NOTES
"Continues with breathlessness, "not better".    Vitals:    07/16/18 0727 07/16/18 0800 07/16/18 0845 07/16/18 1000   BP: (!) 140/74      BP Location:       Patient Position:       Pulse: 79 80 82 92   Resp: 20  20    Temp: 97.9 °F (36.6 °C)      TempSrc: Oral      SpO2: (!) 93%  (!) 93%    Weight:       Height:         Chest: scattered rhonchi++  Cor: no murmur or gallop.  Less edema thighs, arms.    Na 126, Cl 92 (was 125 and 91 yesterday)    The bronchospasm persists.  The edema and the hyponatremia are slightly better.    Plan: More Tolvaptan    Discussed with Edmar Jones.  "

## 2018-07-16 NOTE — PLAN OF CARE
Problem: Patient Care Overview  Goal: Plan of Care Review  Outcome: Ongoing (interventions implemented as appropriate)  Plan of care reviewed with patient.  Cardiac monitoring maintained.  Purposeful rounding completed.  Call bell within reach, no needs at this time, will continue to monitor.

## 2018-07-17 LAB
ALLENS TEST: ABNORMAL
ANION GAP SERPL CALC-SCNC: 7 MMOL/L
BNP SERPL-MCNC: 597 PG/ML
BUN SERPL-MCNC: 28 MG/DL
CALCIUM SERPL-MCNC: 8.9 MG/DL
CHLORIDE SERPL-SCNC: 98 MMOL/L
CO2 SERPL-SCNC: 28 MMOL/L
CREAT SERPL-MCNC: 0.9 MG/DL
DELSYS: ABNORMAL
EST. GFR  (AFRICAN AMERICAN): >60 ML/MIN/1.73 M^2
EST. GFR  (NON AFRICAN AMERICAN): >60 ML/MIN/1.73 M^2
FIO2: 40
FLOW: 5
GLUCOSE SERPL-MCNC: 134 MG/DL
HCO3 UR-SCNC: 28.4 MMOL/L (ref 24–28)
MODE: ABNORMAL
PCO2 BLDA: 45 MMHG (ref 35–45)
PH SMN: 7.41 [PH] (ref 7.35–7.45)
PO2 BLDA: 71 MMHG (ref 80–100)
POC BE: 4 MMOL/L
POC SATURATED O2: 94 % (ref 95–100)
POCT GLUCOSE: 144 MG/DL (ref 70–110)
POCT GLUCOSE: 166 MG/DL (ref 70–110)
POCT GLUCOSE: 171 MG/DL (ref 70–110)
POCT GLUCOSE: 202 MG/DL (ref 70–110)
POTASSIUM SERPL-SCNC: 4.2 MMOL/L
SAMPLE: ABNORMAL
SITE: ABNORMAL
SODIUM SERPL-SCNC: 133 MMOL/L
SP02: 96

## 2018-07-17 PROCEDURE — 82803 BLOOD GASES ANY COMBINATION: CPT

## 2018-07-17 PROCEDURE — 94761 N-INVAS EAR/PLS OXIMETRY MLT: CPT

## 2018-07-17 PROCEDURE — 25000003 PHARM REV CODE 250: Performed by: INTERNAL MEDICINE

## 2018-07-17 PROCEDURE — 63600175 PHARM REV CODE 636 W HCPCS: Performed by: INTERNAL MEDICINE

## 2018-07-17 PROCEDURE — 25000003 PHARM REV CODE 250: Performed by: PHYSICIAN ASSISTANT

## 2018-07-17 PROCEDURE — 25000003 PHARM REV CODE 250: Performed by: NURSE PRACTITIONER

## 2018-07-17 PROCEDURE — 25000003 PHARM REV CODE 250: Performed by: HOSPITALIST

## 2018-07-17 PROCEDURE — 63600175 PHARM REV CODE 636 W HCPCS: Performed by: NURSE PRACTITIONER

## 2018-07-17 PROCEDURE — S4991 NICOTINE PATCH NONLEGEND: HCPCS | Performed by: PHYSICIAN ASSISTANT

## 2018-07-17 PROCEDURE — 27000221 HC OXYGEN, UP TO 24 HOURS

## 2018-07-17 PROCEDURE — 99233 SBSQ HOSP IP/OBS HIGH 50: CPT | Mod: ,,, | Performed by: HOSPITALIST

## 2018-07-17 PROCEDURE — 80048 BASIC METABOLIC PNL TOTAL CA: CPT

## 2018-07-17 PROCEDURE — 99900035 HC TECH TIME PER 15 MIN (STAT)

## 2018-07-17 PROCEDURE — 25000242 PHARM REV CODE 250 ALT 637 W/ HCPCS: Performed by: INTERNAL MEDICINE

## 2018-07-17 PROCEDURE — 83880 ASSAY OF NATRIURETIC PEPTIDE: CPT

## 2018-07-17 PROCEDURE — 94640 AIRWAY INHALATION TREATMENT: CPT

## 2018-07-17 PROCEDURE — 63600175 PHARM REV CODE 636 W HCPCS: Performed by: PHYSICIAN ASSISTANT

## 2018-07-17 PROCEDURE — 11000001 HC ACUTE MED/SURG PRIVATE ROOM

## 2018-07-17 RX ORDER — GUAIFENESIN 600 MG/1
1200 TABLET, EXTENDED RELEASE ORAL 2 TIMES DAILY
Status: DISCONTINUED | OUTPATIENT
Start: 2018-07-17 | End: 2018-07-21 | Stop reason: HOSPADM

## 2018-07-17 RX ORDER — FUROSEMIDE 10 MG/ML
20 INJECTION INTRAMUSCULAR; INTRAVENOUS
Status: DISCONTINUED | OUTPATIENT
Start: 2018-07-17 | End: 2018-07-18

## 2018-07-17 RX ORDER — METHYLPREDNISOLONE SOD SUCC 125 MG
40 VIAL (EA) INJECTION EVERY 12 HOURS
Status: DISCONTINUED | OUTPATIENT
Start: 2018-07-17 | End: 2018-07-20

## 2018-07-17 RX ORDER — TOLVAPTAN 15 MG/1
30 TABLET ORAL DAILY
Status: DISCONTINUED | OUTPATIENT
Start: 2018-07-17 | End: 2018-07-17

## 2018-07-17 RX ADMIN — GUAIFENESIN 600 MG: 600 TABLET, EXTENDED RELEASE ORAL at 08:07

## 2018-07-17 RX ADMIN — FLUTICASONE PROPIONATE 100 MCG: 50 SPRAY, METERED NASAL at 08:07

## 2018-07-17 RX ADMIN — IPRATROPIUM BROMIDE AND ALBUTEROL SULFATE 3 ML: .5; 3 SOLUTION RESPIRATORY (INHALATION) at 07:07

## 2018-07-17 RX ADMIN — HYDROCODONE BITARTRATE AND ACETAMINOPHEN 1 TABLET: 10; 325 TABLET ORAL at 09:07

## 2018-07-17 RX ADMIN — INSULIN ASPART 2 UNITS: 100 INJECTION, SOLUTION INTRAVENOUS; SUBCUTANEOUS at 06:07

## 2018-07-17 RX ADMIN — FAMOTIDINE 20 MG: 20 TABLET ORAL at 08:07

## 2018-07-17 RX ADMIN — IPRATROPIUM BROMIDE AND ALBUTEROL SULFATE 3 ML: .5; 3 SOLUTION RESPIRATORY (INHALATION) at 03:07

## 2018-07-17 RX ADMIN — ENOXAPARIN SODIUM 40 MG: 100 INJECTION SUBCUTANEOUS at 06:07

## 2018-07-17 RX ADMIN — GUAIFENESIN 1200 MG: 600 TABLET, EXTENDED RELEASE ORAL at 08:07

## 2018-07-17 RX ADMIN — TOLVAPTAN 30 MG: 15 TABLET ORAL at 08:07

## 2018-07-17 RX ADMIN — METHYLPREDNISOLONE SODIUM SUCCINATE 80 MG: 125 INJECTION, POWDER, FOR SOLUTION INTRAMUSCULAR; INTRAVENOUS at 05:07

## 2018-07-17 RX ADMIN — IPRATROPIUM BROMIDE AND ALBUTEROL SULFATE 3 ML: .5; 3 SOLUTION RESPIRATORY (INHALATION) at 11:07

## 2018-07-17 RX ADMIN — FUROSEMIDE 20 MG: 10 INJECTION, SOLUTION INTRAMUSCULAR; INTRAVENOUS at 06:07

## 2018-07-17 RX ADMIN — CETIRIZINE HYDROCHLORIDE 10 MG: 10 TABLET, FILM COATED ORAL at 08:07

## 2018-07-17 RX ADMIN — METHYLPREDNISOLONE SODIUM SUCCINATE 40 MG: 125 INJECTION, POWDER, FOR SOLUTION INTRAMUSCULAR; INTRAVENOUS at 08:07

## 2018-07-17 RX ADMIN — METOPROLOL SUCCINATE 50 MG: 50 TABLET, EXTENDED RELEASE ORAL at 08:07

## 2018-07-17 RX ADMIN — GUAIFENESIN AND DEXTROMETHORPHAN 10 ML: 100; 10 SYRUP ORAL at 08:07

## 2018-07-17 RX ADMIN — LOSARTAN POTASSIUM 100 MG: 50 TABLET, FILM COATED ORAL at 08:07

## 2018-07-17 RX ADMIN — CEFTRIAXONE 1 G: 1 INJECTION, SOLUTION INTRAVENOUS at 11:07

## 2018-07-17 RX ADMIN — ATORVASTATIN CALCIUM 40 MG: 20 TABLET, FILM COATED ORAL at 08:07

## 2018-07-17 RX ADMIN — AMLODIPINE BESYLATE 10 MG: 5 TABLET ORAL at 08:07

## 2018-07-17 RX ADMIN — NICOTINE 1 PATCH: 21 PATCH, EXTENDED RELEASE TRANSDERMAL at 09:07

## 2018-07-17 NOTE — PROGRESS NOTES
I'm still short of breath.  Swelling of arms and legs are better.    Vitals:    07/17/18 0730 07/17/18 0734 07/17/18 0800 07/17/18 1000   BP: (!) 166/77      BP Location: Right arm      Patient Position: Lying      Pulse: 84 81 84 82   Resp: (!) 24 (!) 22     Temp: 97.3 °F (36.3 °C)      TempSrc: Oral      SpO2: 95% 97%     Weight:       Height:         Chest: rhonchi+  Cor: no murmur or gallop  Edema of arms and legs is better.    Na 133.    Plan: Consult Pulmonology  Will hold Tolvaptan for now, monitor.  Will plan elective coronary angiography (? Friday)

## 2018-07-17 NOTE — ASSESSMENT & PLAN NOTE
- DuoNebs Q4 & Q4PRN  - significant expiratory wheezing  - wean IV steroids rapidly   - on empiric rocephin day 2  - mucinex bid  - flonase nasal spray bid, will also add cetirizine, robitussin dm  - PRN O2 to maintain O2 sats >88%   - monitor

## 2018-07-17 NOTE — PROGRESS NOTES
"[facility] Medicine  Progress Note    Patient Name: Nalini Varma  MRN: [id number]  Patient Class: IP- Inpatient   Admission Date: 7/13/2018  Length of Stay: 4 days  Attending Physician: Sharron Wright MD  Primary Care Provider: Yane Sahni MD        Subjective:     Principal Problem:Acute diastolic congestive heart failure    HPI:  Ms. Nalnii Varma is a 67 y.o. female, with PMH of COPD, Diastolic Dysfunction, Chronic Hyponatremia (2/2 SIADH, followed by Dr. Hernández), HTN, CKD, venous insufficiency, who presented to [facility] ED on 7/13/18 2/2 chronic SOB that acutely worsened today. She endorses a sensation of "choking all of the time," and states her SOB worsens if she walks 5' or more or even completes simple transfers from  Bed to bed. She additionally notes bilateral lower extremity swelling x 1 month, with 30 lb weight gain, that worsened in the LLE in the past 1 day. She states she has had increased production of sputum, with yellow color, when she coughs, which is all worse than her baseline. She denies fever, chills, sweats, chest pain, palpitations, use of home O2, abdominal pain, N/V/D, numbness, weakness, dizziness, light headedness, skin color change. She endorses compliance with her diuretic regimen at home.  The patient was evaluated in the ED for her symptoms. SOB evaluated with CXR which showed increased cardiomegaly, BNP elevation to 335, all in the presence of worsening lower extremity edema; consistent with worsening CHF given her h/o diastolic dysfunction. She was diuresed with 60 mg lasix. SOB also treated with Nebs, given her h/o COPD and continued tobacco use. Finally, labs showed a decrease from baseline of her Chronic Hyponatremia at 121, baseline appears to be ~129.     Hospital Course:  67 year old female with chronic hyponatremia, copd, diastolic dysfunction came in with shortness of breath, generalized fluid overload in legs, arms, started " on demadex 5 mg daily about a month ago and denies non-compliance.  She wa given 15 mg tolvaptan on 7/14 and she had a good response.  The patient is responding to diuresis per Nephrology and Tolvaptan.      Interval History: The patient still has complaints of SOB with even minimal exertion, but she notes that she is breathing better than on presentation.    Review of Systems   Respiratory: Positive for shortness of breath.    Cardiovascular: Positive for leg swelling.     Objective:     Vital Signs (Most Recent):  Temp: 96.8 °F (36 °C) (07/17/18 1604)  Pulse: 92 (07/17/18 1604)  Resp: 18 (07/17/18 1604)  BP: (!) 164/76 (07/17/18 1604)  SpO2: (!) 94 % (07/17/18 1604) Vital Signs (24h Range):  Temp:  [96.8 °F (36 °C)-98.4 °F (36.9 °C)] 96.8 °F (36 °C)  Pulse:  [78-95] 92  Resp:  [18-24] 18  SpO2:  [91 %-97 %] 94 %  BP: (144-166)/(67-77) 164/76     Weight: 121.8 kg (268 lb 8.3 oz)  Body mass index is 43.34 kg/m².    Intake/Output Summary (Last 24 hours) at 07/17/18 1632  Last data filed at 07/17/18 0759   Gross per 24 hour   Intake             1120 ml   Output             3550 ml   Net            -2430 ml      Physical Exam   Constitutional: She is oriented to person, place, and time. She appears well-developed and well-nourished. No distress.   Obese female in moderate distress with respirations cooperative with exam    HENT:   Head: Normocephalic and atraumatic.   Eyes: Conjunctivae and EOM are normal. Pupils are equal, round, and reactive to light. No scleral icterus.   Neck: Normal range of motion. Neck supple. No JVD present. No tracheal deviation present.   Cardiovascular: Normal rate, regular rhythm, normal heart sounds and intact distal pulses.  Exam reveals no gallop and no friction rub.    No murmur heard.  2+ distal radial/DT/PT pulses. No carotid bruits.  B/l 2+ non-pitting LE edema; L>R.    Pulmonary/Chest: No stridor. She is in respiratory distress (mild). Wheezes: b/l lung fields - mild with  inspiration; moderate with expiration  She has no rales.   Labored respirations on exam   Abdominal: Soft. Bowel sounds are normal. She exhibits no distension. There is no tenderness. There is no guarding.   Neurological: She is alert and oriented to person, place, and time.   Skin: Skin is warm and dry. She is not diaphoretic.   Psychiatric: She has a normal mood and affect. Her behavior is normal. Judgment and thought content normal.   Nursing note and vitals reviewed.    Significant Labs:   CMP:   Recent Labs  Lab 07/16/18  0832 07/16/18  2122 07/17/18  0806   * 129* 133*   K 4.7 4.3 4.2   CL 92* 94* 98   CO2 27 25 28   * 217* 134*   BUN 23 29* 28*   CREATININE 0.9 1.1 0.9   CALCIUM 8.9 8.6* 8.9   ANIONGAP 7* 10 7*   EGFRNONAA >60 52* >60     Magnesium: No results for input(s): MG in the last 48 hours.    Significant Imaging: I have reviewed and interpreted all pertinent imaging results/findings within the past 24 hours.    Assessment/Plan:      * Acute diastolic congestive heart failure    dvt bilateral lower extremities negative  -echo showed normal ef, severe left atrial enlargement with grade 2 diastolic dysfunction  With ? Wall motion abnormality  - BNP is elevated at 335   - CXR with evidence of cardiomegaly   - s/p 60 mg lasix administered in ED   - strict I's & O's   - Cardiology following , may be plan for angiogram later   - repeat tolvaptan 45 mg today , monitor diuresis          Acute hypoxemic respiratory failure    Secondary to copd and fluid overload, o2 as needed to keep sats around 92%          COPD exacerbation    - DuoNebs Q4 & Q4PRN  - significant expiratory wheezing  - wean IV steroids rapidly   - on empiric rocephin day 2  - mucinex bid  - flonase nasal spray bid, will also add cetirizine, robitussin dm  - PRN O2 to maintain O2 sats >88%   - monitor         Acute on Chronic Hyponatremia    - hypervolemic hyponatremia, but much improved  -  followed by Dr. Hernández   -  multifactorial 2/2 h/o SIADH & HF   - baseline appears to be ~129 per available labs   - urine osmolality high for hyponatremia , urine sodium less than 20.  - tolvaptan 15 mg 7/14, 30 mg 7/15 , 45 mg 7/16 , monitor labs and diuresis  - continue to monitor        Tobacco dependence    Nicotine patch daily          Hypertension, benign    - continue home meds  - hydralazine PRN for SBP > 180  - monitor           VTE Risk Mitigation         Ordered     enoxaparin injection 40 mg  Daily      07/13/18 2313     IP VTE HIGH RISK PATIENT  Once      07/13/18 2313     Place REBECCA hose  Until discontinued      07/13/18 2313     Place sequential compression device  Until discontinued      07/13/18 2313              Sharron Wright MD  Department of Hospital Medicine   [facility]

## 2018-07-17 NOTE — SUBJECTIVE & OBJECTIVE
Interval History: The patient still has complaints of SOB with even minimal exertion, but she notes that she is breathing better than on presentation.    Review of Systems   Respiratory: Positive for shortness of breath.    Cardiovascular: Positive for leg swelling.     Objective:     Vital Signs (Most Recent):  Temp: 96.8 °F (36 °C) (07/17/18 1604)  Pulse: 92 (07/17/18 1604)  Resp: 18 (07/17/18 1604)  BP: (!) 164/76 (07/17/18 1604)  SpO2: (!) 94 % (07/17/18 1604) Vital Signs (24h Range):  Temp:  [96.8 °F (36 °C)-98.4 °F (36.9 °C)] 96.8 °F (36 °C)  Pulse:  [78-95] 92  Resp:  [18-24] 18  SpO2:  [91 %-97 %] 94 %  BP: (144-166)/(67-77) 164/76     Weight: 121.8 kg (268 lb 8.3 oz)  Body mass index is 43.34 kg/m².    Intake/Output Summary (Last 24 hours) at 07/17/18 1632  Last data filed at 07/17/18 0759   Gross per 24 hour   Intake             1120 ml   Output             3550 ml   Net            -2430 ml      Physical Exam   Constitutional: She is oriented to person, place, and time. She appears well-developed and well-nourished. No distress.   Obese female in moderate distress with respirations cooperative with exam    HENT:   Head: Normocephalic and atraumatic.   Eyes: Conjunctivae and EOM are normal. Pupils are equal, round, and reactive to light. No scleral icterus.   Neck: Normal range of motion. Neck supple. No JVD present. No tracheal deviation present.   Cardiovascular: Normal rate, regular rhythm, normal heart sounds and intact distal pulses.  Exam reveals no gallop and no friction rub.    No murmur heard.  2+ distal radial/DT/PT pulses. No carotid bruits.  B/l 2+ non-pitting LE edema; L>R.    Pulmonary/Chest: No stridor. She is in respiratory distress (mild). Wheezes: b/l lung fields - mild with inspiration; moderate with expiration  She has no rales.   Labored respirations on exam   Abdominal: Soft. Bowel sounds are normal. She exhibits no distension. There is no tenderness. There is no guarding.    Neurological: She is alert and oriented to person, place, and time.   Skin: Skin is warm and dry. She is not diaphoretic.   Psychiatric: She has a normal mood and affect. Her behavior is normal. Judgment and thought content normal.   Nursing note and vitals reviewed.    Significant Labs:   CMP:   Recent Labs  Lab 07/16/18  0832 07/16/18  2122 07/17/18  0806   * 129* 133*   K 4.7 4.3 4.2   CL 92* 94* 98   CO2 27 25 28   * 217* 134*   BUN 23 29* 28*   CREATININE 0.9 1.1 0.9   CALCIUM 8.9 8.6* 8.9   ANIONGAP 7* 10 7*   EGFRNONAA >60 52* >60     Magnesium: No results for input(s): MG in the last 48 hours.    Significant Imaging: I have reviewed and interpreted all pertinent imaging results/findings within the past 24 hours.

## 2018-07-17 NOTE — PLAN OF CARE
Patient resting in no apparent distress. VSS on 5L NC. Archuleta draining clear yellow urine to gravity. Pt turning in bed per self. Cardiac monitoring maintained. Safety measures maintained. No needs expressed by pt at this time. Door open to nurse station. Will continue to monitor.

## 2018-07-17 NOTE — PROGRESS NOTES
"Progress Note  Nephrology    Admit Date: 7/13/2018   LOS: 4 days     SUBJECTIVE:     Follow-up For:  Hyponatremia    Interval History:    Sound asleep this am.  Breathing much more comfortably.  When awakened she starts to wheeze.....  No CP.  Labs and UOP improving.      Pt seen again this evening and diuresing, but not trendously. She "feels more SOB this evening."        OBJECTIVE:     Vital Signs Range  Temp:  [97 °F (36.1 °C)-98.4 °F (36.9 °C)]   Pulse:  [74-92]   Resp:  [20-24]   BP: (138-166)/(63-77)   SpO2:  [92 %-97 %]     I & O (Last 24H):    Intake/Output Summary (Last 24 hours) at 07/17/18 1051  Last data filed at 07/17/18 0759   Gross per 24 hour   Intake             1120 ml   Output             3550 ml   Net            -2430 ml       Physical Exam:  General appearance: obese.   Eyes:  Conjunctivae/corneas clear. PERRL.  Lungs: wheezing.    Heart: Regular rate and rhythm, S1, S2 normal, no murmur, rub or dionte. TLC Rt SC  Abdomen: Soft, non-tender non-distended; bowel sounds normal; no masses,  no organomegaly  Extremities: No cyanosis or clubbing. anasarca edema improving , but still with some edema.    Skin: Skin color, texture, turgor normal. No rashes or lesions  Neurologic: Normal strength and tone. No focal numbness or weakness   pablo    Laboratory Data:  Reviewed and noted  where applicable- Please see chart for full lab data.    Medications:  Medication list was reviewed and changes noted under Assessment/Plan.    ASSESSMENT/PLAN:        1. Improving Acute on chronic Hyponatremia with suspected underlying SIADH with acute volume overload (E87.1, E22.2): CT chest 2/18 had multiple ground glass nodules which need to be followed up. She had a serum Na of 134 on 7/6 and had been started on Torsemide 5mg daily by Dr. Hernández at the end of may for her new onset edema. She had not been paying any attention to her diet or fluid intake increase  - Na improving with tolvaptan.   - She remains volume " overloaded  - hold tolvaptan and begin Lasix 20 mg q8o and monitor trends.    2. Acute on Chronic Laura CHF with wall motion abnls/Edema (R60.0): Echo noted. No significant urinary protein. Venous dopplers are negative for DVT. Recent liver U/S earlier this year was not impressive. Tolvaptan helping with fluid removal, but needs more thus will start lasix as above. Plans for CV angio noted for perhaps Friday. Defer to Cards.  3. HTN (I10): better overall.   4. Acute exacerbation of COPD/Tobacco dependency (F17.200): Nicotine patch. Nebs, steroids, empiric ABX for exacerbation.  Consider pulm eval.  Check ABG.    5. Thyroid nodule (E04.1): U/S noted. Previously had FNA recommended for 2.1cm nodule. Not sure this has been done. Defer to primary.   6. Anemia: Likely AOCD.  Mild LESLY.  Oral iron for now.     See above  Will follow along.

## 2018-07-17 NOTE — ASSESSMENT & PLAN NOTE
- hypervolemic hyponatremia, but much improved  -  followed by Dr. Hernández   - multifactorial 2/2 h/o SIADH & HF   - baseline appears to be ~129 per available labs   - urine osmolality high for hyponatremia , urine sodium less than 20.  - tolvaptan 15 mg 7/14, 30 mg 7/15 , 45 mg 7/16 , monitor labs and diuresis  - continue to monitor

## 2018-07-18 LAB
ANION GAP SERPL CALC-SCNC: 8 MMOL/L
BNP SERPL-MCNC: 577 PG/ML
BUN SERPL-MCNC: 29 MG/DL
CALCIUM SERPL-MCNC: 8.7 MG/DL
CHLORIDE SERPL-SCNC: 95 MMOL/L
CO2 SERPL-SCNC: 31 MMOL/L
CREAT SERPL-MCNC: 1 MG/DL
EST. GFR  (AFRICAN AMERICAN): >60 ML/MIN/1.73 M^2
EST. GFR  (NON AFRICAN AMERICAN): 58 ML/MIN/1.73 M^2
GLUCOSE SERPL-MCNC: 146 MG/DL
POCT GLUCOSE: 124 MG/DL (ref 70–110)
POCT GLUCOSE: 142 MG/DL (ref 70–110)
POCT GLUCOSE: 190 MG/DL (ref 70–110)
POTASSIUM SERPL-SCNC: 3.8 MMOL/L
SODIUM SERPL-SCNC: 134 MMOL/L

## 2018-07-18 PROCEDURE — 94761 N-INVAS EAR/PLS OXIMETRY MLT: CPT

## 2018-07-18 PROCEDURE — 11000001 HC ACUTE MED/SURG PRIVATE ROOM

## 2018-07-18 PROCEDURE — 80048 BASIC METABOLIC PNL TOTAL CA: CPT

## 2018-07-18 PROCEDURE — 99232 SBSQ HOSP IP/OBS MODERATE 35: CPT | Mod: ,,, | Performed by: HOSPITALIST

## 2018-07-18 PROCEDURE — 25000242 PHARM REV CODE 250 ALT 637 W/ HCPCS

## 2018-07-18 PROCEDURE — 94727 GAS DIL/WSHOT DETER LNG VOL: CPT

## 2018-07-18 PROCEDURE — 27000221 HC OXYGEN, UP TO 24 HOURS

## 2018-07-18 PROCEDURE — 86480 TB TEST CELL IMMUN MEASURE: CPT

## 2018-07-18 PROCEDURE — 63600175 PHARM REV CODE 636 W HCPCS: Performed by: INTERNAL MEDICINE

## 2018-07-18 PROCEDURE — 99900035 HC TECH TIME PER 15 MIN (STAT)

## 2018-07-18 PROCEDURE — 25000003 PHARM REV CODE 250: Performed by: INTERNAL MEDICINE

## 2018-07-18 PROCEDURE — 25000003 PHARM REV CODE 250: Performed by: PHYSICIAN ASSISTANT

## 2018-07-18 PROCEDURE — 63600175 PHARM REV CODE 636 W HCPCS: Performed by: NURSE PRACTITIONER

## 2018-07-18 PROCEDURE — 94664 DEMO&/EVAL PT USE INHALER: CPT

## 2018-07-18 PROCEDURE — 83880 ASSAY OF NATRIURETIC PEPTIDE: CPT

## 2018-07-18 PROCEDURE — 25000003 PHARM REV CODE 250: Performed by: HOSPITALIST

## 2018-07-18 PROCEDURE — 27000646 HC AEROBIKA DEVICE

## 2018-07-18 PROCEDURE — 82164 ANGIOTENSIN I ENZYME TEST: CPT

## 2018-07-18 PROCEDURE — 25000242 PHARM REV CODE 250 ALT 637 W/ HCPCS: Performed by: INTERNAL MEDICINE

## 2018-07-18 PROCEDURE — 63600175 PHARM REV CODE 636 W HCPCS: Performed by: PHYSICIAN ASSISTANT

## 2018-07-18 PROCEDURE — S4991 NICOTINE PATCH NONLEGEND: HCPCS | Performed by: PHYSICIAN ASSISTANT

## 2018-07-18 PROCEDURE — 94729 DIFFUSING CAPACITY: CPT

## 2018-07-18 PROCEDURE — 94060 EVALUATION OF WHEEZING: CPT

## 2018-07-18 PROCEDURE — 97535 SELF CARE MNGMENT TRAINING: CPT

## 2018-07-18 PROCEDURE — 97165 OT EVAL LOW COMPLEX 30 MIN: CPT

## 2018-07-18 PROCEDURE — 94640 AIRWAY INHALATION TREATMENT: CPT

## 2018-07-18 RX ORDER — FUROSEMIDE 10 MG/ML
40 INJECTION INTRAMUSCULAR; INTRAVENOUS 2 TIMES DAILY
Status: DISCONTINUED | OUTPATIENT
Start: 2018-07-18 | End: 2018-07-19

## 2018-07-18 RX ADMIN — IPRATROPIUM BROMIDE AND ALBUTEROL SULFATE 3 ML: .5; 3 SOLUTION RESPIRATORY (INHALATION) at 03:07

## 2018-07-18 RX ADMIN — FLUTICASONE PROPIONATE 100 MCG: 50 SPRAY, METERED NASAL at 08:07

## 2018-07-18 RX ADMIN — GUAIFENESIN 1200 MG: 600 TABLET, EXTENDED RELEASE ORAL at 08:07

## 2018-07-18 RX ADMIN — CETIRIZINE HYDROCHLORIDE 10 MG: 10 TABLET, FILM COATED ORAL at 08:07

## 2018-07-18 RX ADMIN — FLUTICASONE PROPIONATE 100 MCG: 50 SPRAY, METERED NASAL at 09:07

## 2018-07-18 RX ADMIN — LOSARTAN POTASSIUM 100 MG: 50 TABLET, FILM COATED ORAL at 08:07

## 2018-07-18 RX ADMIN — IPRATROPIUM BROMIDE AND ALBUTEROL SULFATE 3 ML: .5; 3 SOLUTION RESPIRATORY (INHALATION) at 07:07

## 2018-07-18 RX ADMIN — ENOXAPARIN SODIUM 40 MG: 100 INJECTION SUBCUTANEOUS at 05:07

## 2018-07-18 RX ADMIN — FUROSEMIDE 20 MG: 10 INJECTION, SOLUTION INTRAMUSCULAR; INTRAVENOUS at 10:07

## 2018-07-18 RX ADMIN — FUROSEMIDE 20 MG: 10 INJECTION, SOLUTION INTRAMUSCULAR; INTRAVENOUS at 02:07

## 2018-07-18 RX ADMIN — AMLODIPINE BESYLATE 10 MG: 5 TABLET ORAL at 08:07

## 2018-07-18 RX ADMIN — IPRATROPIUM BROMIDE AND ALBUTEROL SULFATE 3 ML: .5; 3 SOLUTION RESPIRATORY (INHALATION) at 08:07

## 2018-07-18 RX ADMIN — FUROSEMIDE 40 MG: 10 INJECTION, SOLUTION INTRAVENOUS at 05:07

## 2018-07-18 RX ADMIN — HYDROCODONE BITARTRATE AND ACETAMINOPHEN 1 TABLET: 10; 325 TABLET ORAL at 09:07

## 2018-07-18 RX ADMIN — CEFTRIAXONE 1 G: 1 INJECTION, SOLUTION INTRAVENOUS at 12:07

## 2018-07-18 RX ADMIN — METHYLPREDNISOLONE SODIUM SUCCINATE 40 MG: 125 INJECTION, POWDER, FOR SOLUTION INTRAMUSCULAR; INTRAVENOUS at 09:07

## 2018-07-18 RX ADMIN — IPRATROPIUM BROMIDE AND ALBUTEROL SULFATE 3 ML: .5; 3 SOLUTION RESPIRATORY (INHALATION) at 11:07

## 2018-07-18 RX ADMIN — ATORVASTATIN CALCIUM 40 MG: 20 TABLET, FILM COATED ORAL at 08:07

## 2018-07-18 RX ADMIN — FAMOTIDINE 20 MG: 20 TABLET ORAL at 09:07

## 2018-07-18 RX ADMIN — FAMOTIDINE 20 MG: 20 TABLET ORAL at 08:07

## 2018-07-18 RX ADMIN — GUAIFENESIN 1200 MG: 600 TABLET, EXTENDED RELEASE ORAL at 09:07

## 2018-07-18 RX ADMIN — NICOTINE 1 PATCH: 21 PATCH, EXTENDED RELEASE TRANSDERMAL at 08:07

## 2018-07-18 RX ADMIN — METOPROLOL SUCCINATE 50 MG: 50 TABLET, EXTENDED RELEASE ORAL at 08:07

## 2018-07-18 RX ADMIN — METHYLPREDNISOLONE SODIUM SUCCINATE 40 MG: 125 INJECTION, POWDER, FOR SOLUTION INTRAMUSCULAR; INTRAVENOUS at 10:07

## 2018-07-18 NOTE — PT/OT/SLP EVAL
Occupational Therapy   Evaluation/Treatment  Name: Nalini Varma  MRN: [id number]  Admitting Diagnosis:  Acute diastolic congestive heart failure      Recommendations:     Discharge Recommendations: rehabilitation facility  Pt needs to be able to return home MI with high level of motivation to regain functional independence.  Due to her health care status only an intensive In-Pt rehabilitation  Setting is likely to offer her the specialized treatment needed the endurance and high level of self-care independence needed to return to her PLOF efficiently in a short period of time.    Discharge Equipment Recommendations:  3-in-1 commode  Barriers to discharge:  Decreased caregiver support    History:     Occupational Profile:  Living Environment: lives alone in a 1st floor apartment with no JEREMY at entry  Previous level of function: MI ambulation with rollator, MI self-care cooking and light housework/friend assists with house cleaning  Roles and Routines: TV, socializing with friends  Equipment Owned:  rollator, bath bench (TTB in storage-not available for use)  Assistance upon Discharge: she has very little assist at discharge, a male friend will assist with arrangement (degree of assist limited)    Past Medical History:   Diagnosis Date    Allergy     Anemia due to gastrointestinal blood loss     LESLY from gastric ulcer due to chronic nsaid use    Anxiety     Arthritis     CKD (chronic kidney disease) stage 3, GFR 30-59 ml/min     followed by [facility] study - recieved labs from 2017 and 2/2018    Gastric ulcer     H/O knee surgery 2001    right    Hx of psychiatric care     Hyperlipidemia     Hypertension     Hyponatremia     Psychiatric problem     Therapy     Tobacco abuse        Past Surgical History:   Procedure Laterality Date    COLONOSCOPY  2015    ESOPHAGOGASTRODUODENOSCOPY  2015    FRACTURE SURGERY      left femur    JOINT REPLACEMENT  2007    left knee x 2, 2nd performed 2/2 to MRSA  infection 3 months after 1st surgery    ORIF FEMUR FRACTURE Left     TUBAL LIGATION         Subjective     Chief Complaint: weak from being in bed  Patient/Family stated goals: get back to house cleaning and community activity  Communicated with: patient prior to session.  She was agreeable to the OT session  Pain/Comfort:  · Pain Rating 1: 4/10  · Location - Side 1: Bilateral  · Location - Orientation 1: generalized  · Location 1: foot (Ankle)  · Pain Addressed 1: Cessation of Activity, Reposition  · Pain Rating Post-Intervention 1: 5/10    Patients cultural, spiritual, Confucianism conflicts given the current situation: none    Objective:     Patient found with: telemetry, peripheral IV, oxygen (3L 02)    General Precautions: Standard, fall   Orthopedic Precautions:N/A   Braces: N/A     Occupational Performance:    Bed Mobility:    · SBA supine>sit EOB (HOB raised)    Functional Mobility/Transfers:        CGA sit><stand with rollator        CGA chair transfer with rollator    Ambulated 8' with Rollator CGA got congested and SOB and needed to rest.  She then ambulated 4' to the chair CGA    Activities of Daily Living:  · MI self-feeding (drink water) seated EOB and in the chair  · SBA G/H (brush teeth and wash face) seated in the chair  · Mod A UBD (don hospital gown as robe) seated EOB  · SBA LBD (doff/don socks) seated EOB    Cognitive/Visual Perceptual:  Cognitive/Psychosocial Skills:     -       Oriented to: Person, Place, Time and Situation   -       Follows Commands/attention:Follows two-step commands  -       Communication: clear/fluent  -       Memory: No Deficits noted  -       Safety awareness/insight to disability: impaired   -       Mood/Affect/Coping skills/emotional control: Appropriate to situation and Cooperative  Visual/Perceptual:      -Intact  -reading glasses    Physical Exam:  Postural examination/scapula alignment:    -       Posterior pelvic tilt  -       Abnormal trunk flexion  Skin  "integrity: Visible skin intact  Edema:  Moderate BLE  Sensation:    -       Intact for light touch both hands  Motor Planning:    -       WFL  Dominant hand:    -       Right  UE ROM: RUE: WFL                   LUE: shoulder flexion/ABD 80*   UE Strength:RUE: 4/5                       LUE: arm 3/5 (evidence of possible rotator cuff injury), elbow-forearm 4/5  Hand Function: WFL both hands  Gross motor coordination:   good sitting balance, impaired LE balance and gait, with low tolerance for ambulation due to current cardiopulmonary status    Patient left up in chair with all lines intact, call button in reach and nurse notified    AM PAC 6 Click:  AM PAC Total Score: 18    Treatment & Education:  Transfer training, problem solving with self-care  Education:    Assessment:     Nalini Varma is a 67 y.o. female with a medical diagnosis of Acute diastolic congestive heart failure.  She presents with  Performance deficits affecting function are weakness, impaired endurance, impaired self care skills, impaired functional mobilty, gait instability, impaired balance, decreased upper extremity function, decreased lower extremity function, pain, edema, decreased safety awareness effected performance during the session.  OT treatment is needed to maximize function while in the hospital.     Rehab Prognosis:  Good, highly motivated to regain functional independence; patient would benefit from acute skilled OT services to address these deficits and reach maximum level of function.         Clinical Decision Makin.  OT Low:  "Pt evaluation falls under low complexity for evaluation coding due to performance deficits noted in 1-3 areas as stated above and 0 co-morbities affecting current functional status. Data obtained from problem focused assessments. No modifications or assistance was required for completion of evaluation. Only brief occupational profile and history review completed."     Plan:     Patient to be seen " 6 x/week to address the above listed problems via self-care/home management, therapeutic activities, therapeutic exercises  · Plan of Care Expires: 08/18/18  · Plan of Care Reviewed with: patient    This Plan of care has been discussed with the patient who was involved in its development and understands and is in agreement with the identified goals and treatment plan    GOALS:    Occupational Therapy Goals        Problem: Occupational Therapy Goal    Goal Priority Disciplines Outcome Interventions   Occupational Therapy Goal     OT, PT/OT Ongoing (interventions implemented as appropriate)    Description:  Goals to be met by 8/18/18  1. Set-up G/H (3 tasks) sitting EOB  2. SBA UBD (seated)  3. Mod A clean (dry shampoo) and brush/lcomb hair  4. Assess toilet hygiene  5. Min A pace activity for SOB during therapy session                    Time Tracking:     OT Date of Treatment: 07/18/18  OT Start Time: 1115  OT Stop Time: 1204  OT Total Time (min): 49 min    Billable Minutes:Evaluation 30  Self Care/Home Management 19    RICHARD Plasencia  7/18/2018

## 2018-07-18 NOTE — CONSULTS
"  Admit Date: 7/13/2018   LOS: 5 days     SUBJECTIVE:   Ms. Nalini Varma is a 67 y.o. female, with PMH of COPD, Diastolic Dysfunction, Chronic Hyponatremia (2/2 SIADH, followed by Dr. Hernández), HTN, CKD, venous insufficiency, who presented to [facility] ED on 7/13/18 2/2 chronic SOB that acutely worsened today. She endorses a sensation of "choking all of the time," and states her SOB worsens if she walks 5' or more or even completes simple transfers from  Bed to bed. She additionally notes bilateral lower extremity swelling x 1 month, with 30 lb weight gain, that worsened in the LLE in the past 1 day. She states she has had increased production of sputum, with yellow color, when she coughs, which is all worse than her baseline. She denies fever, chills, sweats, chest pain, palpitations, use of home O2, abdominal pain, N/V/D, numbness, weakness, dizziness, light headedness, skin color change. She endorses compliance with her diuretic regimen at home.  The patient was evaluated in the ED for her symptoms. SOB evaluated with CXR which showed increased cardiomegaly, BNP elevation to 335, all in the presence of worsening lower extremity edema; consistent with worsening CHF given her h/o diastolic dysfunction. She was diuresed with 60 mg lasix. SOB also treated with Nebs, given her h/o COPD and continued tobacco use. Finally, labs showed a decrease from baseline of her Chronic Hyponatremia at 121, baseline appears to be ~129.   Above from the ha nd p  Known copd/smoker on spiriva/elipta/advair-no o2 at home- occ am sputum-feels better post diuresis anasarca/  Continuous Infusions:    Scheduled Meds:   albuterol-ipratropium  3 mL Nebulization Q4H    amLODIPine  10 mg Oral Daily    atorvastatin  40 mg Oral Daily    cefTRIAXone (ROCEPHIN) IVPB  1 g Intravenous Q24H    cetirizine  10 mg Oral Daily    docusate sodium  100 mg Oral Daily    enoxaparin  40 mg Subcutaneous Daily    famotidine  20 mg Oral BID    " ferrous sulfate  325 mg Oral BID    fluticasone  2 spray Each Nare BID    furosemide  20 mg Intravenous Q8H    guaiFENesin  1,200 mg Oral BID    losartan  100 mg Oral Daily    methylPREDNISolone sodium succinate  40 mg Intravenous Q12H    metoprolol succinate  50 mg Oral Daily    nicotine  1 patch Transdermal Daily    polyethylene glycol  17 g Oral Daily       Review of Systems:  Constitutional: positive for malaise, negative for chills  Eyes: negative  Ears, nose, mouth, throat, and face: negative  Respiratory: positive for cough and dyspnea on exertion, negative for hemoptysis  Cardiovascular: positive for dyspnea and lower extremity edema, negative for syncope  Gastrointestinal: negative  Genitourinary:negative  Integument/breast: negative  Hematologic/lymphatic: negative  Musculoskeletal:negative  Neurological: negative  Behavioral/Psych: negative  Endocrine: negative  Allergic/Immunologic: positive for hay fever     OBJECTIVE:     Vital Signs Range (Last 24H):  Temp:  [96.8 °F (36 °C)-97.9 °F (36.6 °C)]   Pulse:  [72-95]   Resp:  [16-24]   BP: (151-165)/(69-83)   SpO2:  [91 %-100 %]     I & O (Last 24H):  Intake/Output Summary (Last 24 hours) at 07/18/18 0745  Last data filed at 07/18/18 0600   Gross per 24 hour   Intake              600 ml   Output             6050 ml   Net            -5450 ml       Physical Exam:  General: mild distress, well developed, well nourished, appears stated age, moderately obese  Neck: no jugular venous distention, thyroid not enlarged, symmetric, no tenderness/mass/nodules, trachea midline and chort neck  Lungs:  diminished breath sounds bibasilar  Chest Wall: no tenderness  Heart: no S3 or S4  Abdomen: abnormal findings:  obese  Extremities: 1 edema  Neurologic: alert, oriented, thought content appropriate    Laboratory:  CBC:   Recent Labs  Lab 07/15/18  0442   WBC 10.78   RBC 3.46*   HGB 10.5*   HCT 31.3*      MCV 91   MCH 30.3   MCHC 33.5     CMP:   Recent  Labs  Lab 07/13/18  1903  07/17/18  0806   *  < > 134*   CALCIUM 8.8  < > 8.9   ALBUMIN 3.4*  --   --    PROT 6.8  --   --    *  < > 133*   K 4.0  < > 4.2   CO2 20*  < > 28   CL 89*  < > 98   BUN 12  < > 28*   CREATININE 0.8  < > 0.9   ALKPHOS 96  --   --    ALT 14  --   --    AST 15  --   --    BILITOT 0.3  --   --    < > = values in this interval not displayed.  Coagulation: No results for input(s): LABPROT, INR, APTT in the last 168 hours.  ABGs:   Recent Labs  Lab 07/17/18  1135   PH 7.408   PCO2 45.0   PO2 71*   HCO3 28.4*   POCSATURATED 94*   BE 4     Microbiology Results (last 7 days)     ** No results found for the last 168 hours. **          Recent Labs  Lab 07/13/18  2142   COLORU Yellow   SPECGRAV <=1.005*   PHUR 6.0   PROTEINUA 1+*   BACTERIA Rare   NITRITE Negative   LEUKOCYTESUR Negative   UROBILINOGEN Negative   HYALINECASTS 0     Past Surgical History:   Procedure Laterality Date    COLONOSCOPY  2015    ESOPHAGOGASTRODUODENOSCOPY  2015    FRACTURE SURGERY      left femur    JOINT REPLACEMENT  2007    left knee x 2, 2nd performed 2/2 to MRSA infection 3 months after 1st surgery    ORIF FEMUR FRACTURE Left     TUBAL LIGATION       Family History   Problem Relation Age of Onset    Hypertension Mother     Alzheimer's disease Mother     Dementia Mother     Depression Mother     Myasthenia gravis Father     Arthritis Father     Rectal cancer Father     Hypertension Brother     Arthritis Brother     Colon cancer Brother     No Known Problems Son     Cancer Sister         brain    Melanoma Neg Hx     Breast cancer Neg Hx      Past Medical History:   Diagnosis Date    Allergy     Anemia due to gastrointestinal blood loss     LESLY from gastric ulcer due to chronic nsaid use    Anxiety     Arthritis     CKD (chronic kidney disease) stage 3, GFR 30-59 ml/min     followed by [facility] study - recieved labs from 2017 and 2/2018    Gastric ulcer     H/O knee surgery 2001     right    Hx of psychiatric care     Hyperlipidemia     Hypertension     Hyponatremia     Psychiatric problem     Therapy     Tobacco abuse      Diagnostic Results:  CT: Reviewed  X-Ray: Reviewed  Social History     Social History    Marital status: Single     Spouse name: N/A    Number of children: 2    Years of education: N/A     Occupational History    unemployed      Social History Main Topics    Smoking status: Current Every Day Smoker     Packs/day: 1.50     Last attempt to quit: 2/3/1967    Smokeless tobacco: Never Used    Alcohol use Yes      Comment: socially, last had cocktails 2-3 weeks ago     Drug use: No    Sexual activity: Not Currently     Birth control/ protection: Abstinence     Other Topics Concern    Are You Pregnant Or Think You May Be? No     Social History Narrative    Originally from [place]    Living in [facility] since 1998    Living in [facility]     ASSESSMENT/PLAN:     Active Hospital Problems    Diagnosis  POA    *Acute diastolic congestive heart failure [I50.31]  Yes    Acute hypoxemic respiratory failure [J96.01]  Yes    COPD exacerbation [J44.1]  Yes    Acute on Chronic Hyponatremia [E87.1]  Yes    Hypertension, benign [I10]  Yes    Tobacco dependence [F17.200]  Yes      Resolved Hospital Problems    Diagnosis Date Resolved POA   No resolved problems to display.   copd- pco2 not elevated but behaves like a chronic bronchitic  Mediastinal adenopathy of doubtful significance but will check serum quant and ace level  pft  Add qjbocgcytv-edjustqejvqdxkh-gkv-will follow    Case Discussed With:

## 2018-07-18 NOTE — PLAN OF CARE
Problem: Patient Care Overview  Goal: Plan of Care Review  Outcome: Ongoing (interventions implemented as appropriate)  Pt remained free of falls and injuries. Archuleta to gravity. Managed blood sugars. No complaints of pain throughout the day. Bed low and locked, call light in reach, side rails up X2. Will continue to monitor.

## 2018-07-18 NOTE — PLAN OF CARE
Problem: Patient Care Overview  Goal: Plan of Care Review  Outcome: Ongoing (interventions implemented as appropriate)  Pt remained free of falls and injuries. On 2L oxygen per NC. Ambulates with assistance to the chair. Blood sugars remained within normal limits. No complaints of pain. Bed low and locked, call light within reach, side rails up X2. Will continue to monitor.

## 2018-07-18 NOTE — PLAN OF CARE
Problem: Patient Care Overview  Goal: Plan of Care Review  Outcome: Ongoing (interventions implemented as appropriate)  Some grunting and dyspnea noted but patient is coping well.

## 2018-07-18 NOTE — PLAN OF CARE
Problem: Occupational Therapy Goal  Goal: Occupational Therapy Goal  Goals to be met by 8/18/18  1. Set-up G/H (3 tasks) sitting EOB  2. SBA UBD (seated)  3. Mod A clean (dry shampoo) and brush/lcomb hair  4. Assess toilet hygiene  5. Min A pace activity for SOB during therapy session  Outcome: Ongoing (interventions implemented as appropriate)  OT evaluation completed with treatment initiated.  Recommend In-Pt Rehabilitation at discharge.  DME: 3-in-1 [employer].  RICHARD Plasencia 7/18/2018

## 2018-07-18 NOTE — PROGRESS NOTES
Says she feels better.  Breathing more comfortably. Less wheezing.  Had noticed that her arms and legs are less swollen.  Appreciate 's input.    Vitals:    07/18/18 1211 07/18/18 1400 07/18/18 1540 07/18/18 1600   BP: (!) 146/71      BP Location: Right arm      Patient Position: Sitting      Pulse: 78 84 88 82   Resp: (!) 22  20    Temp: 97.6 °F (36.4 °C)      TempSrc: Oral      SpO2: 96%  95%    Weight:       Height:         Chest scattered rhonchi +  Cor:  No murmur or gallop  Ext:  Minimal edema of arms and lower legs.    Na 134  Bun 29  Creat 1.0    Was given IV Lasix today.  Would recommend holding further diuretics.  I plan coronary angiography Friday morning.

## 2018-07-18 NOTE — PLAN OF CARE
Patient resting with no needs expressed at this time. VSS on 5L NC. BG monitored. Archuleta draining to gravity. PRN pain medication given effectively x1 for bilateral leg/foot pain. Safety measures maintained. Central line dressing c/d/i. Will continue to monitor.

## 2018-07-18 NOTE — PROGRESS NOTES
Progress Note  Nephrology    Admit Date: 7/13/2018   LOS: 5 days     SUBJECTIVE:     Follow-up For:  Hyponatremia    Interval History:    More alert/awake/breathing easier.  No CP and SOB much improved.  Minimal wheezing. Labs noted.     ROS:      Negative except for above    OBJECTIVE:     Vital Signs Range  Temp:  [96.8 °F (36 °C)-97.9 °F (36.6 °C)]   Pulse:  [72-95]   Resp:  [16-24]   BP: (151-165)/(69-83)   SpO2:  [91 %-100 %]     I & O (Last 24H):    Intake/Output Summary (Last 24 hours) at 07/18/18 1021  Last data filed at 07/18/18 0730   Gross per 24 hour   Intake              600 ml   Output             6050 ml   Net            -5450 ml       Physical Exam:  General appearance: obese.   Eyes:  Conjunctivae/corneas clear. PERRL.  Lungs: wheezing but improved.    Heart: Regular rate and rhythm, S1, S2 normal, no murmur, rub or dionte. TLC Rt SC  Abdomen: Soft, non-tender non-distended; bowel sounds normal; no masses,  no organomegaly  Extremities: No cyanosis or clubbing. anasarca edema improving , but still with some edema.    Skin: Skin color, texture, turgor normal. No rashes or lesions  Neurologic: Normal strength and tone. No focal numbness or weakness   pablo    Laboratory Data:  Reviewed and noted  where applicable- Please see chart for full lab data.    Medications:  Medication list was reviewed and changes noted under Assessment/Plan.    ASSESSMENT/PLAN:        1. Improving Acute on chronic Hyponatremia with suspected underlying SIADH with acute volume overload (E87.1, E22.2): CT chest 2/18 had multiple ground glass nodules which need to be followed up. She had a serum Na of 134 on 7/6 and had been started on Torsemide 5mg daily by Dr. Hernández at the end of may for her new onset edema. She had not been paying any attention to her diet or fluid intake increase  - Na improved with tolvaptan.   - She remains volume overloaded  - hold tolvaptan and began lasix.    -follow trends daily.     2. Acute on  Chronic Laura CHF with wall motion abnls/Edema (R60.0): Echo noted. No significant urinary protein. Venous dopplers are negative for DVT. Recent liver U/S earlier this year was not impressive. Tolvaptan helping with fluid removal, but needs more thus started lasix as above. Plans for CV angio noted for perhaps Friday. Defer to Cards.  3. HTN (I10): better overall.   4. Acute exacerbation of COPD/Tobacco dependency (F17.200): Nicotine patch. Nebs, steroids, empiric ABX for exacerbation.  Pulm eval noted and appreciated.  Check ABG.    5. Thyroid nodule (E04.1): U/S noted. Previously had FNA recommended for 2.1cm nodule. Not sure this has been done. Defer to primary.   6. Anemia: Likely AOCD.  Mild LESLY.  Oral iron for now.     See above  Will follow along for renal needs.

## 2018-07-18 NOTE — PLAN OF CARE
Problem: Patient Care Overview  Goal: Plan of Care Review  Outcome: Ongoing (interventions implemented as appropriate)  Pt remains on 3LNC. Tx given and tolerated No distress noted.

## 2018-07-18 NOTE — PT/OT/SLP PROGRESS
Physical Therapy      Patient Name:  Nalini Varma   MRN:  [id number]    Patient not seen today secondary to  (Pt unavailable, currently in testing for 6 min walk with pulmonary rehab in OP clinic). Will follow-up as appropriate.    Jazmine Noel, PT

## 2018-07-19 LAB
ACE SERPL-CCNC: 19 U/L
ANION GAP SERPL CALC-SCNC: 9 MMOL/L
BUN SERPL-MCNC: 8 MG/DL
CALCIUM SERPL-MCNC: 7.9 MG/DL
CHLORIDE SERPL-SCNC: 98 MMOL/L
CO2 SERPL-SCNC: 30 MMOL/L
CREAT SERPL-MCNC: 0.5 MG/DL
EST. GFR  (AFRICAN AMERICAN): >60 ML/MIN/1.73 M^2
EST. GFR  (NON AFRICAN AMERICAN): >60 ML/MIN/1.73 M^2
GLUCOSE SERPL-MCNC: 121 MG/DL
MAGNESIUM SERPL-MCNC: 2.1 MG/DL
POCT GLUCOSE: 141 MG/DL (ref 70–110)
POCT GLUCOSE: 147 MG/DL (ref 70–110)
POCT GLUCOSE: 154 MG/DL (ref 70–110)
POCT GLUCOSE: 170 MG/DL (ref 70–110)
POCT GLUCOSE: 213 MG/DL (ref 70–110)
POTASSIUM SERPL-SCNC: 2.9 MMOL/L
SODIUM SERPL-SCNC: 137 MMOL/L

## 2018-07-19 PROCEDURE — 99232 SBSQ HOSP IP/OBS MODERATE 35: CPT | Mod: ,,, | Performed by: HOSPITALIST

## 2018-07-19 PROCEDURE — 63600175 PHARM REV CODE 636 W HCPCS: Performed by: NURSE PRACTITIONER

## 2018-07-19 PROCEDURE — 80048 BASIC METABOLIC PNL TOTAL CA: CPT

## 2018-07-19 PROCEDURE — 25000003 PHARM REV CODE 250: Performed by: INTERNAL MEDICINE

## 2018-07-19 PROCEDURE — 94761 N-INVAS EAR/PLS OXIMETRY MLT: CPT

## 2018-07-19 PROCEDURE — 94640 AIRWAY INHALATION TREATMENT: CPT

## 2018-07-19 PROCEDURE — 94664 DEMO&/EVAL PT USE INHALER: CPT

## 2018-07-19 PROCEDURE — 83735 ASSAY OF MAGNESIUM: CPT

## 2018-07-19 PROCEDURE — 25000242 PHARM REV CODE 250 ALT 637 W/ HCPCS: Performed by: INTERNAL MEDICINE

## 2018-07-19 PROCEDURE — 25000003 PHARM REV CODE 250: Performed by: PHYSICIAN ASSISTANT

## 2018-07-19 PROCEDURE — 97161 PT EVAL LOW COMPLEX 20 MIN: CPT

## 2018-07-19 PROCEDURE — 99900035 HC TECH TIME PER 15 MIN (STAT)

## 2018-07-19 PROCEDURE — 63600175 PHARM REV CODE 636 W HCPCS: Performed by: PHYSICIAN ASSISTANT

## 2018-07-19 PROCEDURE — 63600175 PHARM REV CODE 636 W HCPCS: Performed by: INTERNAL MEDICINE

## 2018-07-19 PROCEDURE — 25000003 PHARM REV CODE 250: Performed by: HOSPITALIST

## 2018-07-19 PROCEDURE — S4991 NICOTINE PATCH NONLEGEND: HCPCS | Performed by: PHYSICIAN ASSISTANT

## 2018-07-19 PROCEDURE — 11000001 HC ACUTE MED/SURG PRIVATE ROOM

## 2018-07-19 PROCEDURE — 97535 SELF CARE MNGMENT TRAINING: CPT

## 2018-07-19 PROCEDURE — 25000003 PHARM REV CODE 250: Performed by: NURSE PRACTITIONER

## 2018-07-19 PROCEDURE — 94618 PULMONARY STRESS TESTING: CPT

## 2018-07-19 PROCEDURE — 97530 THERAPEUTIC ACTIVITIES: CPT

## 2018-07-19 RX ORDER — NAPROXEN SODIUM 220 MG/1
81 TABLET, FILM COATED ORAL DAILY
Status: DISCONTINUED | OUTPATIENT
Start: 2018-07-19 | End: 2018-07-21 | Stop reason: HOSPADM

## 2018-07-19 RX ORDER — ZOLPIDEM TARTRATE 5 MG/1
5 TABLET ORAL NIGHTLY PRN
Status: DISCONTINUED | OUTPATIENT
Start: 2018-07-19 | End: 2018-07-21 | Stop reason: HOSPADM

## 2018-07-19 RX ORDER — FUROSEMIDE 40 MG/1
40 TABLET ORAL DAILY
Status: DISCONTINUED | OUTPATIENT
Start: 2018-07-19 | End: 2018-07-19

## 2018-07-19 RX ORDER — POTASSIUM CHLORIDE 20 MEQ/15ML
40 SOLUTION ORAL 2 TIMES DAILY
Status: COMPLETED | OUTPATIENT
Start: 2018-07-19 | End: 2018-07-19

## 2018-07-19 RX ADMIN — POTASSIUM CHLORIDE 40 MEQ: 40 SOLUTION ORAL at 09:07

## 2018-07-19 RX ADMIN — AMLODIPINE BESYLATE 10 MG: 5 TABLET ORAL at 09:07

## 2018-07-19 RX ADMIN — NICOTINE 1 PATCH: 21 PATCH, EXTENDED RELEASE TRANSDERMAL at 09:07

## 2018-07-19 RX ADMIN — IPRATROPIUM BROMIDE AND ALBUTEROL SULFATE 3 ML: .5; 3 SOLUTION RESPIRATORY (INHALATION) at 04:07

## 2018-07-19 RX ADMIN — GUAIFENESIN 1200 MG: 600 TABLET, EXTENDED RELEASE ORAL at 09:07

## 2018-07-19 RX ADMIN — FUROSEMIDE 40 MG: 40 TABLET ORAL at 09:07

## 2018-07-19 RX ADMIN — ATORVASTATIN CALCIUM 40 MG: 20 TABLET, FILM COATED ORAL at 09:07

## 2018-07-19 RX ADMIN — IPRATROPIUM BROMIDE AND ALBUTEROL SULFATE 3 ML: .5; 3 SOLUTION RESPIRATORY (INHALATION) at 03:07

## 2018-07-19 RX ADMIN — IPRATROPIUM BROMIDE AND ALBUTEROL SULFATE 3 ML: .5; 3 SOLUTION RESPIRATORY (INHALATION) at 12:07

## 2018-07-19 RX ADMIN — METOPROLOL SUCCINATE 50 MG: 50 TABLET, EXTENDED RELEASE ORAL at 09:07

## 2018-07-19 RX ADMIN — IPRATROPIUM BROMIDE AND ALBUTEROL SULFATE 3 ML: .5; 3 SOLUTION RESPIRATORY (INHALATION) at 07:07

## 2018-07-19 RX ADMIN — INSULIN ASPART 1 UNITS: 100 INJECTION, SOLUTION INTRAVENOUS; SUBCUTANEOUS at 09:07

## 2018-07-19 RX ADMIN — DOCUSATE SODIUM 100 MG: 100 CAPSULE, LIQUID FILLED ORAL at 09:07

## 2018-07-19 RX ADMIN — ZOLPIDEM TARTRATE 5 MG: 5 TABLET ORAL at 10:07

## 2018-07-19 RX ADMIN — ENOXAPARIN SODIUM 40 MG: 100 INJECTION SUBCUTANEOUS at 04:07

## 2018-07-19 RX ADMIN — CETIRIZINE HYDROCHLORIDE 10 MG: 10 TABLET, FILM COATED ORAL at 09:07

## 2018-07-19 RX ADMIN — FAMOTIDINE 20 MG: 20 TABLET ORAL at 09:07

## 2018-07-19 RX ADMIN — FLUTICASONE PROPIONATE 100 MCG: 50 SPRAY, METERED NASAL at 09:07

## 2018-07-19 RX ADMIN — METHYLPREDNISOLONE SODIUM SUCCINATE 40 MG: 125 INJECTION, POWDER, FOR SOLUTION INTRAMUSCULAR; INTRAVENOUS at 09:07

## 2018-07-19 RX ADMIN — ASPIRIN 81 MG CHEWABLE TABLET 81 MG: 81 TABLET CHEWABLE at 10:07

## 2018-07-19 RX ADMIN — LOSARTAN POTASSIUM 100 MG: 50 TABLET, FILM COATED ORAL at 09:07

## 2018-07-19 RX ADMIN — CEFTRIAXONE 1 G: 1 INJECTION, SOLUTION INTRAVENOUS at 10:07

## 2018-07-19 RX ADMIN — IPRATROPIUM BROMIDE AND ALBUTEROL SULFATE 3 ML: .5; 3 SOLUTION RESPIRATORY (INHALATION) at 11:07

## 2018-07-19 NOTE — PT/OT/SLP PROGRESS
"Occupational Therapy   Treatment    Name: Nalini Varma  MRN: [id number]  Admitting Diagnosis:  Acute diastolic congestive heart failure       Recommendations:     Discharge Recommendations:  HH OT/PT  Discharge Equipment Recommendations:  3-in-1 commode  Barriers to discharge:  Decreased caregiver support    Subjective     Communicated with: RN prior to session.     Pt reports, "I have been using this rollator for years. I know what I'm doing."     Pain/Comfort:  · Pain Rating 1: 0/10  · Pain Rating Post-Intervention 1: 0/10    Patients cultural, spiritual, Rastafari conflicts given the current situation: none    Objective:     Patient found with: telemetry, peripheral IV, oxygen (3L O2 via NC)    General Precautions: Standard, fall   Orthopedic Precautions:N/A   Braces: N/A     Occupational Performance:    Bed Mobility: Pt already seated in bedside chair upon arrival.     Functional Mobility/Transfers:  · Patient completed Sit <> Stand Transfer with contact guard assistance  with  rollator   · Patient completed Bed <> Chair Transfer using Step Transfer technique with contact guard assistance with rollator  · Functional Mobility: house-hold distance in prep for ADLs using rollator with CGA progressing to SBA; cues for safety- locking brakes, hand placement however pt resistant to therapist's education. Short standing and seated rest breaks required to complete with notable SOB; PT attempted to educate on pursed-lip breathing however pt resistant.     Activities of Daily Living:  · Grooming: stand by assistance seated on her rollator to brush teeth and wash face on RA- SPO2 at 93%    Patient left up in chair with all lines intact, call button in reach, RN notified and PT and friend present    [facility] 6 Click:  AMPA Total Score: 18    Treatment & Education:  Educated pt on safe rollator technique and hand placement- tendency to not lock her brakes prior to ascent/descent.     SPO2 on RA during seated G/H tasks was " 93%  SPO2 on 3 L O2 via NC after ambulation with conversation was 94-95%  Education:    Assessment:     Nalini Varma is a 67 y.o. female with a medical diagnosis of Acute diastolic congestive heart failure. Pt is making steady progress towards her OT goals. Pt was able to progress to SBA for functional transfers and mobility using rollator. Cues for safe rollator technique pursed lip breathing provided however resistant. Pt continues to demo decreased endurance with need for standing and seated rest breaks during functional mobility however was able to maintain SPO2 >93% throughout tasks on 3 L supplemental oxygen. Pt failed 6-minute walk test, so will qualify for home oxygen. Performance deficits affecting function are weakness, impaired endurance, impaired self care skills, impaired functional mobilty, gait instability, impaired balance, decreased safety awareness, impaired cardiopulmonary response to activity. Pt remains appropriate for acute OT services at this time. Feel that pt is more appropriate for home health vs rehab (pt does have transportation issues). Pt is close to her functional baseline and feel that she can achieve return to PLOF within the home setting. She will have assist from friend/neighbor daily. Pt also resistant to therapeutic interventions, therefore not confident she would be a strong rehab candidate at this time.     Rehab Prognosis:  Good ; patient would benefit from acute skilled OT services to address these deficits and reach maximum level of function.       Plan:     Patient to be seen 6 x/week to address the above listed problems via self-care/home management, therapeutic activities, therapeutic exercises  · Plan of Care Expires: 08/18/18  · Plan of Care Reviewed with: patient    This Plan of care has been discussed with the patient who was involved in its development and understands and is in agreement with the identified goals and treatment plan    GOALS:    Occupational  Therapy Goals        Problem: Occupational Therapy Goal    Goal Priority Disciplines Outcome Interventions   Occupational Therapy Goal     OT, PT/OT Ongoing (interventions implemented as appropriate)    Description:  Goals to be met by 8/18/18  1. Set-up G/H (3 tasks) sitting EOB  2. SBA UBD (seated)  3. Mod A clean (dry shampoo) and brush/lcomb hair  4. Assess toilet hygiene  5. Min A pace activity for SOB during therapy session                    Time Tracking:     OT Date of Treatment: 07/19/18  OT Start Time: 1129  OT Stop Time: 1200  OT Total Time (min): 31 min    Billable Minutes:Self Care/Home Management 20  Therapeutic Activity 11    Caterina Ponce OTR/L  7/19/2018

## 2018-07-19 NOTE — PLAN OF CARE
Problem: Patient Care Overview  Goal: Plan of Care Review  Outcome: Ongoing (interventions implemented as appropriate)  Persistent grunting noted but patient coping well.

## 2018-07-19 NOTE — PLAN OF CARE
Problem: Patient Care Overview  Goal: Plan of Care Review  Outcome: Ongoing (interventions implemented as appropriate)  Pt ambulated in hallwall with PT, sat up in chair, BP has been elevated, pt reports no headache, dizziness, blurred vision, or ringing in the eras.

## 2018-07-19 NOTE — PLAN OF CARE
Problem: Physical Therapy Goal  Goal: Physical Therapy Goal  Goals to be met by: 8/10/18     Patient will increase functional independence with mobility by performin. Supine to sit with supervision.   2. Sit to supine with supervision.   3. Sit<>stand transfer with supervision using rollator.   4. Gait > 100 feet with SBA using rollator   Outcome: Ongoing (interventions implemented as appropriate)  PT evaluation completed. Please see progress note for details, POC, and recommendations.

## 2018-07-19 NOTE — PROGRESS NOTES
"[facility] Medicine  Progress Note    Patient Name: Nalini Varma  MRN: [id number]  Patient Class: IP- Inpatient   Admission Date: 7/13/2018  Length of Stay: 5 days  Attending Physician: Sharron Wright MD  Primary Care Provider: Yane Sahni MD        Subjective:     Principal Problem:Acute diastolic congestive heart failure    HPI:  Ms. Nalini Varma is a 67 y.o. female, with PMH of COPD, Diastolic Dysfunction, Chronic Hyponatremia (2/2 SIADH, followed by Dr. Hernández), HTN, CKD, venous insufficiency, who presented to [facility] ED on 7/13/18 2/2 chronic SOB that acutely worsened today. She endorses a sensation of "choking all of the time," and states her SOB worsens if she walks 5' or more or even completes simple transfers from  Bed to bed. She additionally notes bilateral lower extremity swelling x 1 month, with 30 lb weight gain, that worsened in the LLE in the past 1 day. She states she has had increased production of sputum, with yellow color, when she coughs, which is all worse than her baseline. She denies fever, chills, sweats, chest pain, palpitations, use of home O2, abdominal pain, N/V/D, numbness, weakness, dizziness, light headedness, skin color change. She endorses compliance with her diuretic regimen at home.  The patient was evaluated in the ED for her symptoms. SOB evaluated with CXR which showed increased cardiomegaly, BNP elevation to 335, all in the presence of worsening lower extremity edema; consistent with worsening CHF given her h/o diastolic dysfunction. She was diuresed with 60 mg lasix. SOB also treated with Nebs, given her h/o COPD and continued tobacco use. Finally, labs showed a decrease from baseline of her Chronic Hyponatremia at 121, baseline appears to be ~129.     Hospital Course:  67 year old female with chronic hyponatremia, copd, diastolic dysfunction came in with shortness of breath, generalized fluid overload in legs, arms, started " on demadex 5 mg daily about a month ago and denies non-compliance.  She wa given 15 mg tolvaptan on 7/14 and she had a good response.  The patient is responding to diuresis per Nephrology and Tolvaptan.      Interval History: The patient has decreasing sob    Review of Systems   Respiratory: Positive for shortness of breath.    Cardiovascular: Positive for leg swelling.     Objective:     Vital Signs (Most Recent):  Temp: 97.6 °F (36.4 °C) (07/18/18 1211)  Pulse: 97 (07/18/18 1914)  Resp: (!) 22 (07/18/18 1914)  BP: (!) 146/71 (07/18/18 1211)  SpO2: 96 % (07/18/18 1914) Vital Signs (24h Range):  Temp:  [97.5 °F (36.4 °C)-97.8 °F (36.6 °C)] 97.6 °F (36.4 °C)  Pulse:  [72-97] 97  Resp:  [16-22] 22  SpO2:  [93 %-98 %] 96 %  BP: (146-165)/(69-83) 146/71     Weight: 121.8 kg (268 lb 8.3 oz)  Body mass index is 43.34 kg/m².    Intake/Output Summary (Last 24 hours) at 07/18/18 1934  Last data filed at 07/18/18 1724   Gross per 24 hour   Intake              360 ml   Output             6050 ml   Net            -5690 ml      Physical Exam   Constitutional: She is oriented to person, place, and time. She appears well-developed and well-nourished. No distress.   Obese female in moderate distress with respirations cooperative with exam    HENT:   Head: Normocephalic and atraumatic.   Eyes: Conjunctivae and EOM are normal. Pupils are equal, round, and reactive to light. No scleral icterus.   Neck: Normal range of motion. Neck supple. No JVD present. No tracheal deviation present.   Cardiovascular: Normal rate, regular rhythm, normal heart sounds and intact distal pulses.  Exam reveals no gallop and no friction rub.    No murmur heard.  2+ distal radial/DT/PT pulses. No carotid bruits.  B/l 2+ non-pitting LE edema; L>R.    Pulmonary/Chest: No stridor. She is in respiratory distress (mild). Wheezes: b/l lung fields - mild with inspiration; moderate with expiration  She has no rales.   Labored respirations on exam   Abdominal: Soft.  Bowel sounds are normal. She exhibits no distension. There is no tenderness. There is no guarding.   Neurological: She is alert and oriented to person, place, and time.   Skin: Skin is warm and dry. She is not diaphoretic.   Psychiatric: She has a normal mood and affect. Her behavior is normal. Judgment and thought content normal.   Nursing note and vitals reviewed.      Significant Labs:     CMP:   Recent Labs  Lab 07/16/18  2122 07/17/18  0806 07/18/18  0837   * 133* 134*   K 4.3 4.2 3.8   CL 94* 98 95   CO2 25 28 31*   * 134* 146*   BUN 29* 28* 29*   CREATININE 1.1 0.9 1.0   CALCIUM 8.6* 8.9 8.7   ANIONGAP 10 7* 8   EGFRNONAA 52* >60 58*         Assessment/Plan:      * Acute diastolic congestive heart failure    dvt bilateral lower extremities negative  -echo showed normal ef, severe left atrial enlargement with grade 2 diastolic dysfunction  With ? Wall motion abnormality  - BNP is elevated at 335   - CXR with evidence of cardiomegaly   - s/p 60 mg lasix administered in ED   - strict I's & O's   - Cardiology following , may be plan for angiogram later   - repeat tolvaptan 45 mg today , monitor diuresis          Acute hypoxemic respiratory failure    Secondary to copd and fluid overload, o2 as needed to keep sats around 92%          COPD exacerbation    - DuoNebs Q4 & Q4PRN  - significant expiratory wheezing  - wean IV steroids rapidly   - on empiric rocephin day 2  - mucinex bid  - flonase nasal spray bid, will also add cetirizine, robitussin dm  - PRN O2 to maintain O2 sats >88%   - monitor         Acute on Chronic Hyponatremia    - hypervolemic hyponatremia, but much improved  -  followed by Dr. Hernández   - multifactorial 2/2 h/o SIADH & HF   - baseline appears to be ~129 per available labs   - urine osmolality high for hyponatremia , urine sodium less than 20.  - tolvaptan 15 mg 7/14, 30 mg 7/15 , 45 mg 7/16 , monitor labs and diuresis  - continue to monitor        Tobacco dependence    Nicotine  patch daily          Hypertension, benign    - continue home meds  - hydralazine PRN for SBP > 180  - monitor           VTE Risk Mitigation         Ordered     enoxaparin injection 40 mg  Daily      07/13/18 2313     IP VTE HIGH RISK PATIENT  Once      07/13/18 2313     Place REBECCA hose  Until discontinued      07/13/18 2313     Place sequential compression device  Until discontinued      07/13/18 2313              Sharron Wright MD  Department of Hospital Medicine   [facility]

## 2018-07-19 NOTE — PROGRESS NOTES
Feels better. Less breathless.    Vitals:    07/19/18 0700 07/19/18 0723 07/19/18 0800 07/19/18 0833   BP:    (!) 159/75   BP Location:       Patient Position:       Pulse: 79 80 84 81   Resp:  20  20   Temp:    97.8 °F (36.6 °C)   TempSrc:    Oral   SpO2:  (!) 94%  95%   Weight:       Height:         Chest Rare rhonchi  Cor: no gallop  Trace edema lower legs.    Na 137  BUN 8  Creat .9  K 2.9    Imp: Lasix induced hypokalemia.  Will replace K+  Plan: angio, possible PCI in am.  Discussed at length with patient, she understands and wants me to proceed.  Discussed with Edmar Jones.

## 2018-07-19 NOTE — PT/OT/SLP EVAL
Physical Therapy Evaluation    Patient Name:  Nalini Varma   MRN:  [id number]    Recommendations:     Discharge Recommendations:  Home health PT/OT   Discharge Equipment Recommendations: 3-in-1 commode, oxygen (O2 per respiratory 6 min walk test results)   Barriers to discharge: Decreased caregiver support    Assessment:     Nalini Varma is a 67 y.o. female admitted with a medical diagnosis of Acute diastolic congestive heart failure.  She presents with the following impairments/functional limitations:  impaired endurance, decreased safety awareness, impaired cardiopulmonary response to activity, edema, impaired self care skills. PT evaluation completed. Pt resistant to rollator use education and cues, not locking brakes for transfers or when sitting on seat. Pt SBA with gait use of rollator. Pt reports at baseline she isn't able to ambulate longer than ~ 50' distance. Recommend home health follow up upon discharge vs. OP pulmonary rehab per patient preference and transportation needs.     Rehab Prognosis:  Good; patient would benefit from acute skilled PT services to address these deficits and reach maximum level of function.      Recent Surgery: Procedure(s) (LRB):  ANGIOGRAM, CORONARY ARTERY (N/A)      Plan:     During this hospitalization, patient to be seen 5 x/week to address the above listed problems via gait training, therapeutic activities, therapeutic exercises, neuromuscular re-education  · Plan of Care Expires:  08/18/18   Plan of Care Reviewed with: patient, friend    Subjective     Communicated with RN prior to session.  Patient found seated on rollator at sink with PT present upon PT entry to room, agreeable to evaluation.      Chief Complaint: recliner chair was uncomfortable  Patient comments/goals: None stated   Pain/Comfort:  · Pain Rating 1: 0/10  · Pain Rating Post-Intervention 1: 0/10    Patients cultural, spiritual, Congregation conflicts given the current situation: none  "specified    Living Environment:  lives alone in a 1st floor apartment with no JEREMY at entry  Prior to admission, patients level of function was mod (I) with short gait distances using a rollator and requiring seated rest breaks on rollator seat secondary to SOB. Pt reports she takes a taxi to the grocery store, uses the electric scooter at the store for shopping.  Patient has the following equipment: rollator (pt owns TTB but is in storage unit and not accessible).  DME owned (not currently used): None.  Upon discharge, patient will have assistance from neighbor that lives 4 doors down, but he does not have transportation means either.    Objective:     Patient found with: peripheral IV, telemetry, oxygen (3L via NC)     General Precautions: Standard, fall   Orthopedic Precautions:N/A   Braces: N/A     Exams:  · Cognition: Patient is oriented to Person, Place, Time and Situation and follows approximately 100% of one step simple commands.    · Posture:    · -       Rounded shoulders  · -       Forward head   · Tripod recovery position in standing for catching breath   · Sensation: Intact to light touch bilateral LEs.   · Skin Integrity: Visible skin intact  · Edema: Pitting 2+ BLE  · Coordination: No coordination impairments identified with functional mobility. No formal testing performed.   · LE ROM/Strength: BLE WFL  · Tone: No tone impairments identified during functional mobility.     Functional Mobility:  · Transfers:     · Sit to Stand:  contact guard assistance with rollator x 2 from rollator seat, pt required verbal cues for locking rollator brakes, although refuses stating "I have to push it away to stand up"  · Gait: 2 x 30' with rollator with CGA progressing to SBA at slow royer, pt talking during slow gait with minimal SOB noted, x 1 tripod recovery position in standing and x 1 seated rest break for recovery needed.     AM-PAC 6 CLICK MOBILITY  Total Score:18     Therapeutic Activities and Exercises:   " SpO2 90% on RA prior to session (at sink performing grooming), SpO2 94% with activity on 3L     Patient left up in chair with all lines intact, call button in reach, RN notified and friend present.    GOALS:    Physical Therapy Goals        Problem: Physical Therapy Goal    Goal Priority Disciplines Outcome Goal Variances Interventions   Physical Therapy Goal     PT/OT, PT Ongoing (interventions implemented as appropriate)     Description:  Goals to be met by: 8/10/18     Patient will increase functional independence with mobility by performin. Supine to sit with supervision.   2. Sit to supine with supervision.   3. Sit<>stand transfer with supervision using rollator.   4. Gait > 100 feet with SBA using rollator                     History:     Past Medical History:   Diagnosis Date    Allergy     Anemia due to gastrointestinal blood loss     LESLY from gastric ulcer due to chronic nsaid use    Anxiety     Arthritis     CKD (chronic kidney disease) stage 3, GFR 30-59 ml/min     followed by [facility] study - recieved labs from  and 2018    Gastric ulcer     H/O knee surgery     right    Hx of psychiatric care     Hyperlipidemia     Hypertension     Hyponatremia     Psychiatric problem     Therapy     Tobacco abuse        Past Surgical History:   Procedure Laterality Date    COLONOSCOPY      ESOPHAGOGASTRODUODENOSCOPY      FRACTURE SURGERY      left femur    JOINT REPLACEMENT      left knee x 2, 2nd performed  to MRSA infection 3 months after 1st surgery    ORIF FEMUR FRACTURE Left     TUBAL LIGATION         Clinical Decision Making:     History  Co-morbidities and personal factors that may impact the plan of care Examination  Body Structures and Functions, activity limitations and participation restrictions that may impact the plan of care Clinical Presentation   Decision Making/ Complexity Score   Co-morbidities:   [] Time since onset of injury / illness /  exacerbation  [] Status of current condition  []Patient's cognitive status and safety concerns    [] Multiple Medical Problems (see med hx)  Personal Factors:   [] Patient's age  [] Prior Level of function   [] Patient's home situation (environment and family support)  [] Patient's level of motivation  [] Expected progression of patient      HISTORY:(criteria)    [] 24869 - no personal factors/history    [] 41582 - has 1-2 personal factor/comorbidity     [] 23463 - has >3 personal factor/comorbidity     Body Regions:  [] Objective examination findings  [] Head     []  Neck  [] Trunk   [] Upper Extremity  [] Lower Extremity    Body Systems:  [] For communication ability, affect, cognition, language, and learning style: the assessment of the ability to make needs known, consciousness, orientation (person, place, and time), expected emotional /behavioral responses, and learning preferences (eg, learning barriers, education  needs)  [] For the neuromuscular system: a general assessment of gross coordinated movement (eg, balance, gait, locomotion, transfers, and transitions) and motor function  (motor control and motor learning)  [] For the musculoskeletal system: the assessment of gross symmetry, gross range of motion, gross strength, height, and weight  [] For the integumentary system: the assessment of pliability(texture), presence of scar formation, skin color, and skin integrity  [] For cardiovascular/pulmonary system: the assessment of heart rate, respiratory rate, blood pressure, and edema     Activity limitations:    [] Patient's cognitive status and saf ety concerns          [] Status of current condition      [] Weight bearing restriction  [] Cardiopulmunary Restriction    Participation Restrictions:   [] Goals and goal agreement with the patient     [] Rehab potential (prognosis) and probable outcome      Examination of Body System: (criteria)    [] 24321 - addressing 1-2 elements    [] 28606 - addressing a  total of 3 or more elements     [] 55950 -  Addressing a total of 4 or more elements         Clinical Presentation: (criteria)  Choose one     On examination of body system using standardized tests and measures patient presents with (CHOOSE ONE) elements from any of the following: body structures and functions, activity limitations, and/or participation restrictions.  Leading to a clinical presentation that is considered (CHOOSE ONE)                              Clinical Decision Making  (Eval Complexity):  Choose One     Time Tracking:     PT Received On: 07/19/18  PT Start Time: 1139     PT Stop Time: 1202  PT Total Time (min): 23 min     Billable Minutes: Evaluation 23    Jazmine Noel, PT  07/19/2018

## 2018-07-19 NOTE — PHYSICIAN QUERY
"PT Name: Nalini Varma  MR #: [id number]    Physician Query Form - Heart  Condition Clarification     CDS/: Elina Taylor                 Contact information:  This form is a permanent document in the medical record.     Query Date: July 19, 2018    By submitting this query, we are merely seeking further clarification of documentation. Please utilize your independent clinical judgment when addressing the question(s) below.    The medical record contains the following   Indicators     Supporting Clinical Findings Location in Medical Record    BNP      EF      Radiology findings      Echo Results      "Ascites" documented      "SOB" or "MEDELLIN" documented      "Hypoxia" documented     x Heart Failure documented Acute on Chronic Laura CHF    Acute diastolic congestive heart failure  Nephrology progress note 7-15  HM note 7-16    "Edema" documented      Diuretics/Meds      Treatment:     x Other:  Urgent evaluation of 67-year-old female smoker with known COPD, prior dx of chronic  hyponatremia attributed to SIADH, long hx of LE edema here with acute worsening shortness of breath and difficulty ambulating 2/2 dyspnea.  Here pt in acute resp distress  w increasign work of breathing, patient sitting upright, diffuse expiratory wheezing, with tachypnea, impressive lower extremity pitting edema, left upper extremity edema greater than right. Suspect COPD component with plan for eval for fluid overload,  chf and reassess.  Pt has had B LE US neg for DVT, echo EF 65% w diastolic dysfunction 2016.      ED MD   Heart failure (HF) can be acute, chronic or both. It is generally further specificed as systolic, diastolic, or combined. Lastly, it is important to identify an underlying etiology if known or suspected.     Common clues to acute exacerbation:  Rapidly progressive symptoms (w/in 2 weeks of presentation), using IV diuretics to treat, using supplemental O2, pulmonary edema on Xray, MI w/in 4 weeks, and/or BNP " >500    Systolic Heart Failure: is defined as chart documentation of a left ventricular ejection fraction (LVEF) less than 40%     Diastolic Heart Failure: is defined as a left ventricular ejection fraction (LVEF) greater than 40%   +      Evidence of diastolic dysfunction on echocardiography OR    Right heart catheterization wedge pressure above 12 mm Hg OR    Left heart catheterization left ventricular end diastolic pressure 18 mm Hg or above.    References: *American Heart Association    The clinical guidelines noted below are only system guidelines, and do not replace the providers clinical judgment.     Provider, please specify the diagnosis associated with above clinical findings     Please further specify the Acuity of the diastolic CHF.    [   ] Acute Diastolic Heart Failure - New diagnosis.  EF > 40%  and acute HF symptoms documented  [ x  ] Acute on Chronic Diastolic Heart Failure -    Pre-existing diastoic HF diagnosis.  EF > 40%  and acute HF symptoms documented                                   [   ] Other (please specify): ___________________________________  [   ] Clinically Undetermined                          Please document in your progress notes daily for the duration of treatment until resolved and include in your discharge summary.

## 2018-07-19 NOTE — SUBJECTIVE & OBJECTIVE
Interval History: The patient has decreasing sob    Review of Systems   Respiratory: Positive for shortness of breath.    Cardiovascular: Positive for leg swelling.     Objective:     Vital Signs (Most Recent):  Temp: 97.6 °F (36.4 °C) (07/18/18 1211)  Pulse: 97 (07/18/18 1914)  Resp: (!) 22 (07/18/18 1914)  BP: (!) 146/71 (07/18/18 1211)  SpO2: 96 % (07/18/18 1914) Vital Signs (24h Range):  Temp:  [97.5 °F (36.4 °C)-97.8 °F (36.6 °C)] 97.6 °F (36.4 °C)  Pulse:  [72-97] 97  Resp:  [16-22] 22  SpO2:  [93 %-98 %] 96 %  BP: (146-165)/(69-83) 146/71     Weight: 121.8 kg (268 lb 8.3 oz)  Body mass index is 43.34 kg/m².    Intake/Output Summary (Last 24 hours) at 07/18/18 1934  Last data filed at 07/18/18 1724   Gross per 24 hour   Intake              360 ml   Output             6050 ml   Net            -5690 ml      Physical Exam   Constitutional: She is oriented to person, place, and time. She appears well-developed and well-nourished. No distress.   Obese female in moderate distress with respirations cooperative with exam    HENT:   Head: Normocephalic and atraumatic.   Eyes: Conjunctivae and EOM are normal. Pupils are equal, round, and reactive to light. No scleral icterus.   Neck: Normal range of motion. Neck supple. No JVD present. No tracheal deviation present.   Cardiovascular: Normal rate, regular rhythm, normal heart sounds and intact distal pulses.  Exam reveals no gallop and no friction rub.    No murmur heard.  2+ distal radial/DT/PT pulses. No carotid bruits.  B/l 2+ non-pitting LE edema; L>R.    Pulmonary/Chest: No stridor. She is in respiratory distress (mild). Wheezes: b/l lung fields - mild with inspiration; moderate with expiration  She has no rales.   Labored respirations on exam   Abdominal: Soft. Bowel sounds are normal. She exhibits no distension. There is no tenderness. There is no guarding.   Neurological: She is alert and oriented to person, place, and time.   Skin: Skin is warm and dry. She is  not diaphoretic.   Psychiatric: She has a normal mood and affect. Her behavior is normal. Judgment and thought content normal.   Nursing note and vitals reviewed.      Significant Labs:     CMP:   Recent Labs  Lab 07/16/18  2122 07/17/18  0806 07/18/18  0837   * 133* 134*   K 4.3 4.2 3.8   CL 94* 98 95   CO2 25 28 31*   * 134* 146*   BUN 29* 28* 29*   CREATININE 1.1 0.9 1.0   CALCIUM 8.6* 8.9 8.7   ANIONGAP 10 7* 8   EGFRNONAA 52* >60 58*

## 2018-07-19 NOTE — PLAN OF CARE
Problem: Occupational Therapy Goal  Goal: Occupational Therapy Goal  Goals to be met by 8/18/18  1. Set-up G/H (3 tasks) sitting EOB  2. SBA UBD (seated)  3. Mod A clean (dry shampoo) and brush/lcomb hair  4. Assess toilet hygiene  5. Min A pace activity for SOB during therapy session   Outcome: Ongoing (interventions implemented as appropriate)  Pt is making steady progress towards her OT goals. Goals remain appropriate at this time.     Caterina Ponce, OTR/L  7/19/2018

## 2018-07-19 NOTE — PLAN OF CARE
Problem: Patient Care Overview  Goal: Plan of Care Review  Outcome: Ongoing (interventions implemented as appropriate)  Restful night, pain to shoulders relieved with po medications, free of fall, trauma. Continued purposeful frequent rounding.

## 2018-07-19 NOTE — PLAN OF CARE
Problem: Patient Care Overview  Goal: Plan of Care Review  Outcome: Ongoing (interventions implemented as appropriate)  Pt remains on  3LNC. Tx given and tolerated well. No distress noted.

## 2018-07-19 NOTE — PROGRESS NOTES
Progress Note  Pulmonology    Admit Date: 7/13/2018   LOS: 6 days     SUBJECTIVE:   Moving air better-severe copd with bronchial reactivity on pft  Continuous Infusions:    Scheduled Meds:   albuterol-ipratropium  3 mL Nebulization Q4H    amLODIPine  10 mg Oral Daily    atorvastatin  40 mg Oral Daily    cefTRIAXone (ROCEPHIN) IVPB  1 g Intravenous Q24H    cetirizine  10 mg Oral Daily    docusate sodium  100 mg Oral Daily    enoxaparin  40 mg Subcutaneous Daily    famotidine  20 mg Oral BID    ferrous sulfate  325 mg Oral BID    fluticasone  2 spray Each Nare BID    furosemide  40 mg Intravenous BID    guaiFENesin  1,200 mg Oral BID    losartan  100 mg Oral Daily    methylPREDNISolone sodium succinate  40 mg Intravenous Q12H    metoprolol succinate  50 mg Oral Daily    nicotine  1 patch Transdermal Daily    polyethylene glycol  17 g Oral Daily       Review of Systems:  Constitutional: no fever or chills  Respiratory: positive for dyspnea on exertion  Cardiovascular: no chest pain or palpitations      OBJECTIVE:     Vital Signs Range (Last 24H):  Temp:  [97.2 °F (36.2 °C)-98.3 °F (36.8 °C)]   Pulse:  [70-97]   Resp:  [14-22]   BP: (143-159)/(71-76)   SpO2:  [94 %-99 %]     I & O (Last 24H):  Intake/Output Summary (Last 24 hours) at 07/19/18 0834  Last data filed at 07/19/18 0500   Gross per 24 hour   Intake               50 ml   Output             3700 ml   Net            -3650 ml       Physical Exam:  General: no distress, moderately obese  Neck: no jugular venous distention and thyroid not enlarged, symmetric, no tenderness/mass/nodules, trachea midline  Lungs:  diminished breath sounds bilaterally and wheezes bilaterally  Chest Wall: no tenderness  Heart: no S3 or S4  Abdomen: abnormal findings:  obese  Extremities: trace edema  Neurologic: alert, oriented, thought content appropriate    Laboratory:  CBC:   Recent Labs  Lab 07/15/18  0442   WBC 10.78   RBC 3.46*   HGB 10.5*   HCT 31.3*       MCV 91   MCH 30.3   MCHC 33.5     CMP:   Recent Labs  Lab 07/13/18  1903  07/19/18  0326   *  < > 121*   CALCIUM 8.8  < > 7.9*   ALBUMIN 3.4*  --   --    PROT 6.8  --   --    *  < > 137   K 4.0  < > 2.9*   CO2 20*  < > 30*   CL 89*  < > 98   BUN 12  < > 8   CREATININE 0.8  < > 0.5   ALKPHOS 96  --   --    ALT 14  --   --    AST 15  --   --    BILITOT 0.3  --   --    < > = values in this interval not displayed.  Cardiac markers:   Recent Labs  Lab 07/13/18  1903   TROPONINI 0.014     ABGs:   Recent Labs  Lab 07/17/18  1135   PH 7.408   PCO2 45.0   PO2 71*   HCO3 28.4*   POCSATURATED 94*   BE 4           Diagnostic Results:  CT: Reviewed  X-Ray: Reviewed    ASSESSMENT/PLAN:     Patient Active Problem List   Diagnosis    Chronic obstructive pulmonary disease    Uncomplicated opioid dependence    Hypertension, benign    Anemia    Tobacco dependence    Chronic pain following surgery or procedure    Hyperlipidemia    Osteoporosis    Abnormal ECG    MARY (generalized anxiety disorder)    Acute on Chronic Hyponatremia    Hypomagnesemia    Thyroid nodule s/p biopsy 2017 - benign    Pulmonary nodule    Chronic pain of left knee    Chronic left shoulder pain    Diverticulosis of large intestine without hemorrhage    Hyperkalemia    COPD exacerbation    Acute diastolic congestive heart failure    Acute hypoxemic respiratory failure     Cont o2  Can switch to laba/lama/ics close to dc  Ok to wean to oral steroids/abx  No smoking  Lose weight  Check cxr today

## 2018-07-19 NOTE — PROGRESS NOTES
Progress Note  Nephrology    Admit Date: 7/13/2018   LOS: 6 days     SUBJECTIVE:     Follow-up For:  Hyponatremia    Interval History:    Seen after CXR.  Breathing better.  No CP.  Renal fxn and lytes noted.     ROS:      Negative except for above    OBJECTIVE:     Vital Signs Range  Temp:  [97.2 °F (36.2 °C)-98.3 °F (36.8 °C)]   Pulse:  [70-97]   Resp:  [14-22]   BP: (143-159)/(71-76)   SpO2:  [94 %-99 %]     I & O (Last 24H):    Intake/Output Summary (Last 24 hours) at 07/19/18 1005  Last data filed at 07/19/18 0907   Gross per 24 hour   Intake               50 ml   Output             4300 ml   Net            -4250 ml       Physical Exam:  General appearance: obese.   Eyes:  Conjunctivae/corneas clear. PERRL.  Lungs: wheezing but improved.    Heart: Regular rate and rhythm, S1, S2 normal, no murmur, rub or dionte. TLC Rt SC  Abdomen: Soft, non-tender non-distended; bowel sounds normal; no masses,  no organomegaly  Extremities: No cyanosis or clubbing. anasarca edema improving , but still with some edema.    Skin: Skin color, texture, turgor normal. No rashes or lesions  Neurologic: Normal strength and tone. No focal numbness or weakness   pablo    Laboratory Data:  Reviewed and noted  where applicable- Please see chart for full lab data.    Medications:  Medication list was reviewed and changes noted under Assessment/Plan.    ASSESSMENT/PLAN:        1. Resolved Acute on chronic Hyponatremia with suspected underlying SIADH with acute volume overload (E87.1, E22.2): CT chest 2/18 had multiple ground glass nodules which need to be followed up. She had a serum Na of 134 on 7/6 and had been started on Torsemide 5mg daily by Dr. Hernández at the end of may for her new onset edema. She had not been paying any attention to her diet or fluid intake increase  - Na improved with tolvaptan.   - hold tolvaptan and began lasix but will hold in preparation of heart cath.    -follow trends daily.     2. Acute on Chronic Laura CHF with  wall motion abnls/Edema (R60.0): Echo noted. No significant urinary protein. Venous dopplers are negative for DVT. Recent liver U/S earlier this year was not impressive. Tolvaptan helping with fluid removal, but needs more thus started lasix as above. Plans for CV angio noted for Friday. Defer to Cards.  3. HTN (I10): better overall.   4. Acute exacerbation of COPD/Tobacco dependency (F17.200): Nicotine patch. Nebs, steroids, empiric ABX for exacerbation.  Pulm eval noted and appreciated.    5. Thyroid nodule (E04.1): U/S noted. Previously had FNA recommended for 2.1cm nodule. Not sure this has been done. Defer to primary.   6. Anemia: Likely AOCD.  Mild LESLY.  Oral iron for now.     See above  Will follow from afar for renal needs.

## 2018-07-19 NOTE — PROCEDURES
Six minute walk done.  Pt at RA at rest sats 91%.  Pt at RA with activity ( getting up to sit on edge of bed) sats 87%.  Placed pt on 2LNC and sats increased to 95%.  Return pt to lying down position.

## 2018-07-20 ENCOUNTER — SURGERY (OUTPATIENT)
Age: 68
End: 2018-07-20

## 2018-07-20 LAB
ANION GAP SERPL CALC-SCNC: 6 MMOL/L
BUN SERPL-MCNC: 37 MG/DL
CALCIUM SERPL-MCNC: 8.3 MG/DL
CHLORIDE SERPL-SCNC: 96 MMOL/L
CO2 SERPL-SCNC: 31 MMOL/L
CREAT SERPL-MCNC: 1 MG/DL
EST. GFR  (AFRICAN AMERICAN): >60 ML/MIN/1.73 M^2
EST. GFR  (NON AFRICAN AMERICAN): 58 ML/MIN/1.73 M^2
GLUCOSE SERPL-MCNC: 167 MG/DL
POCT GLUCOSE: 149 MG/DL (ref 70–110)
POCT GLUCOSE: 157 MG/DL (ref 70–110)
POCT GLUCOSE: 164 MG/DL (ref 70–110)
POTASSIUM SERPL-SCNC: 4.4 MMOL/L
SODIUM SERPL-SCNC: 133 MMOL/L

## 2018-07-20 PROCEDURE — 25000242 PHARM REV CODE 250 ALT 637 W/ HCPCS: Performed by: INTERNAL MEDICINE

## 2018-07-20 PROCEDURE — 25000003 PHARM REV CODE 250: Performed by: PHYSICIAN ASSISTANT

## 2018-07-20 PROCEDURE — G0269 OCCLUSIVE DEVICE IN VEIN ART: HCPCS

## 2018-07-20 PROCEDURE — 25000003 PHARM REV CODE 250: Performed by: INTERNAL MEDICINE

## 2018-07-20 PROCEDURE — 63600175 PHARM REV CODE 636 W HCPCS

## 2018-07-20 PROCEDURE — 86480 TB TEST CELL IMMUN MEASURE: CPT

## 2018-07-20 PROCEDURE — 99232 SBSQ HOSP IP/OBS MODERATE 35: CPT | Mod: ,,, | Performed by: HOSPITALIST

## 2018-07-20 PROCEDURE — 99900035 HC TECH TIME PER 15 MIN (STAT)

## 2018-07-20 PROCEDURE — 4A023N8 MEASUREMENT OF CARDIAC SAMPLING AND PRESSURE, BILATERAL, PERCUTANEOUS APPROACH: ICD-10-PCS | Performed by: INTERNAL MEDICINE

## 2018-07-20 PROCEDURE — 99152 MOD SED SAME PHYS/QHP 5/>YRS: CPT

## 2018-07-20 PROCEDURE — 11000001 HC ACUTE MED/SURG PRIVATE ROOM

## 2018-07-20 PROCEDURE — 25500020 PHARM REV CODE 255

## 2018-07-20 PROCEDURE — 25000003 PHARM REV CODE 250

## 2018-07-20 PROCEDURE — B2151ZZ FLUOROSCOPY OF LEFT HEART USING LOW OSMOLAR CONTRAST: ICD-10-PCS | Performed by: INTERNAL MEDICINE

## 2018-07-20 PROCEDURE — B2111ZZ FLUOROSCOPY OF MULTIPLE CORONARY ARTERIES USING LOW OSMOLAR CONTRAST: ICD-10-PCS | Performed by: INTERNAL MEDICINE

## 2018-07-20 PROCEDURE — 27200066 CATH LAB PROCEDURE

## 2018-07-20 PROCEDURE — 27000646 HC AEROBIKA DEVICE

## 2018-07-20 PROCEDURE — 94761 N-INVAS EAR/PLS OXIMETRY MLT: CPT

## 2018-07-20 PROCEDURE — 94640 AIRWAY INHALATION TREATMENT: CPT

## 2018-07-20 PROCEDURE — 63600175 PHARM REV CODE 636 W HCPCS: Performed by: INTERNAL MEDICINE

## 2018-07-20 PROCEDURE — 63600175 PHARM REV CODE 636 W HCPCS: Performed by: NURSE PRACTITIONER

## 2018-07-20 PROCEDURE — S4991 NICOTINE PATCH NONLEGEND: HCPCS | Performed by: PHYSICIAN ASSISTANT

## 2018-07-20 PROCEDURE — 80048 BASIC METABOLIC PNL TOTAL CA: CPT

## 2018-07-20 PROCEDURE — 25000003 PHARM REV CODE 250: Performed by: HOSPITALIST

## 2018-07-20 PROCEDURE — 27000221 HC OXYGEN, UP TO 24 HOURS

## 2018-07-20 PROCEDURE — 94664 DEMO&/EVAL PT USE INHALER: CPT

## 2018-07-20 RX ORDER — SODIUM CHLORIDE 9 MG/ML
INJECTION, SOLUTION INTRAVENOUS CONTINUOUS
Status: DISCONTINUED | OUTPATIENT
Start: 2018-07-20 | End: 2018-07-21

## 2018-07-20 RX ORDER — PREDNISONE 20 MG/1
20 TABLET ORAL DAILY
Status: DISCONTINUED | OUTPATIENT
Start: 2018-07-20 | End: 2018-07-21 | Stop reason: HOSPADM

## 2018-07-20 RX ORDER — SODIUM CHLORIDE 9 MG/ML
50 INJECTION, SOLUTION INTRAVENOUS CONTINUOUS
Status: DISPENSED | OUTPATIENT
Start: 2018-07-20 | End: 2018-07-20

## 2018-07-20 RX ORDER — NITROGLYCERIN 0.4 MG/1
0.4 TABLET SUBLINGUAL EVERY 5 MIN PRN
Status: DISCONTINUED | OUTPATIENT
Start: 2018-07-20 | End: 2018-07-21 | Stop reason: HOSPADM

## 2018-07-20 RX ORDER — MAG HYDROX/ALUMINUM HYD/SIMETH 200-200-20
30 SUSPENSION, ORAL (FINAL DOSE FORM) ORAL
Status: DISCONTINUED | OUTPATIENT
Start: 2018-07-20 | End: 2018-07-21 | Stop reason: HOSPADM

## 2018-07-20 RX ORDER — HYDROCODONE BITARTRATE AND ACETAMINOPHEN 10; 325 MG/1; MG/1
1 TABLET ORAL EVERY 4 HOURS PRN
Status: DISCONTINUED | OUTPATIENT
Start: 2018-07-20 | End: 2018-07-21 | Stop reason: HOSPADM

## 2018-07-20 RX ORDER — ACETAMINOPHEN 325 MG/1
650 TABLET ORAL EVERY 4 HOURS PRN
Status: DISCONTINUED | OUTPATIENT
Start: 2018-07-20 | End: 2018-07-21 | Stop reason: HOSPADM

## 2018-07-20 RX ORDER — ONDANSETRON 2 MG/ML
4 INJECTION INTRAMUSCULAR; INTRAVENOUS EVERY 12 HOURS PRN
Status: DISCONTINUED | OUTPATIENT
Start: 2018-07-20 | End: 2018-07-21 | Stop reason: HOSPADM

## 2018-07-20 RX ORDER — DIPHENHYDRAMINE HYDROCHLORIDE 50 MG/ML
25 INJECTION INTRAMUSCULAR; INTRAVENOUS EVERY 6 HOURS PRN
Status: DISCONTINUED | OUTPATIENT
Start: 2018-07-20 | End: 2018-07-21 | Stop reason: HOSPADM

## 2018-07-20 RX ORDER — HYDROCODONE BITARTRATE AND ACETAMINOPHEN 5; 325 MG/1; MG/1
1 TABLET ORAL EVERY 4 HOURS PRN
Status: DISCONTINUED | OUTPATIENT
Start: 2018-07-20 | End: 2018-07-21 | Stop reason: HOSPADM

## 2018-07-20 RX ORDER — HYDRALAZINE HYDROCHLORIDE 25 MG/1
25 TABLET, FILM COATED ORAL EVERY 6 HOURS PRN
Status: DISCONTINUED | OUTPATIENT
Start: 2018-07-20 | End: 2018-07-21 | Stop reason: HOSPADM

## 2018-07-20 RX ADMIN — LOSARTAN POTASSIUM 100 MG: 50 TABLET, FILM COATED ORAL at 08:07

## 2018-07-20 RX ADMIN — CETIRIZINE HYDROCHLORIDE 10 MG: 10 TABLET, FILM COATED ORAL at 02:07

## 2018-07-20 RX ADMIN — IPRATROPIUM BROMIDE AND ALBUTEROL SULFATE 3 ML: .5; 3 SOLUTION RESPIRATORY (INHALATION) at 11:07

## 2018-07-20 RX ADMIN — AMLODIPINE BESYLATE 10 MG: 5 TABLET ORAL at 08:07

## 2018-07-20 RX ADMIN — SODIUM CHLORIDE: 0.9 INJECTION, SOLUTION INTRAVENOUS at 08:07

## 2018-07-20 RX ADMIN — NICOTINE 1 PATCH: 21 PATCH, EXTENDED RELEASE TRANSDERMAL at 09:07

## 2018-07-20 RX ADMIN — METOPROLOL SUCCINATE 50 MG: 50 TABLET, EXTENDED RELEASE ORAL at 08:07

## 2018-07-20 RX ADMIN — HYDROCODONE BITARTRATE AND ACETAMINOPHEN 1 TABLET: 10; 325 TABLET ORAL at 12:07

## 2018-07-20 RX ADMIN — PREDNISONE 20 MG: 20 TABLET ORAL at 03:07

## 2018-07-20 RX ADMIN — METHYLPREDNISOLONE SODIUM SUCCINATE 40 MG: 125 INJECTION, POWDER, FOR SOLUTION INTRAMUSCULAR; INTRAVENOUS at 08:07

## 2018-07-20 RX ADMIN — HYDROCODONE BITARTRATE AND ACETAMINOPHEN 1 TABLET: 10; 325 TABLET ORAL at 08:07

## 2018-07-20 RX ADMIN — IPRATROPIUM BROMIDE AND ALBUTEROL SULFATE 3 ML: .5; 3 SOLUTION RESPIRATORY (INHALATION) at 07:07

## 2018-07-20 RX ADMIN — CEFTRIAXONE 1 G: 1 INJECTION, SOLUTION INTRAVENOUS at 02:07

## 2018-07-20 RX ADMIN — FAMOTIDINE 20 MG: 20 TABLET ORAL at 08:07

## 2018-07-20 RX ADMIN — GUAIFENESIN 1200 MG: 600 TABLET, EXTENDED RELEASE ORAL at 02:07

## 2018-07-20 RX ADMIN — ASPIRIN 81 MG CHEWABLE TABLET 81 MG: 81 TABLET CHEWABLE at 09:07

## 2018-07-20 RX ADMIN — ATORVASTATIN CALCIUM 40 MG: 20 TABLET, FILM COATED ORAL at 08:07

## 2018-07-20 RX ADMIN — IPRATROPIUM BROMIDE AND ALBUTEROL SULFATE 3 ML: .5; 3 SOLUTION RESPIRATORY (INHALATION) at 03:07

## 2018-07-20 RX ADMIN — FLUTICASONE PROPIONATE 100 MCG: 50 SPRAY, METERED NASAL at 08:07

## 2018-07-20 RX ADMIN — IPRATROPIUM BROMIDE AND ALBUTEROL SULFATE 3 ML: .5; 3 SOLUTION RESPIRATORY (INHALATION) at 04:07

## 2018-07-20 RX ADMIN — IPRATROPIUM BROMIDE AND ALBUTEROL SULFATE 3 ML: .5; 3 SOLUTION RESPIRATORY (INHALATION) at 08:07

## 2018-07-20 NOTE — PROGRESS NOTES
"[facility] Hospital Medicine  Progress Note    Patient Name: Nalini Varma  MRN: [id number]  Patient Class: IP- Inpatient   Admission Date: 7/13/2018  Length of Stay: 7 days  Attending Physician: Sharron Wright MD  Primary Care Provider: Yane Sahni MD        Subjective:     Principal Problem:Acute diastolic congestive heart failure    HPI:  Ms. Nalini Varma is a 67 y.o. female, with PMH of COPD, Diastolic Dysfunction, Chronic Hyponatremia (2/2 SIADH, followed by Dr. Hernández), HTN, CKD, venous insufficiency, who presented to [facility] ED on 7/13/18 2/2 chronic SOB that acutely worsened today. She endorses a sensation of "choking all of the time," and states her SOB worsens if she walks 5' or more or even completes simple transfers from  Bed to bed. She additionally notes bilateral lower extremity swelling x 1 month, with 30 lb weight gain, that worsened in the LLE in the past 1 day. She states she has had increased production of sputum, with yellow color, when she coughs, which is all worse than her baseline. She denies fever, chills, sweats, chest pain, palpitations, use of home O2, abdominal pain, N/V/D, numbness, weakness, dizziness, light headedness, skin color change. She endorses compliance with her diuretic regimen at home.  The patient was evaluated in the ED for her symptoms. SOB evaluated with CXR which showed increased cardiomegaly, BNP elevation to 335, all in the presence of worsening lower extremity edema; consistent with worsening CHF given her h/o diastolic dysfunction. She was diuresed with 60 mg lasix. SOB also treated with Nebs, given her h/o COPD and continued tobacco use. Finally, labs showed a decrease from baseline of her Chronic Hyponatremia at 121, baseline appears to be ~129.     Hospital Course:  67 year old female with chronic hyponatremia, copd, diastolic dysfunction came in with shortness of breath, generalized fluid overload in legs, arms, started " on demadex 5 mg daily about a month ago and denies non-compliance.  She wa given 15 mg tolvaptan on 7/14 and she had a good response.  The patient is responding to diuresis per Nephrology and on Tolvaptan hyponatremia has resolved.  Pulmonary consulted and recommends PFTs.   COPD exacerbation managed with steroids and bronchodilator treatments.  Cardiology to perform [facility] 7/20.    Interval History: Patient is breathing better and cooperating with PT/OT.    Review of Systems   Respiratory: Positive for cough and shortness of breath.    Cardiovascular: Positive for leg swelling.     Objective:     Vital Signs (Most Recent):  Temp: 97.7 °F (36.5 °C) (07/20/18 0338)  Pulse: 82 (07/20/18 0759)  Resp: 18 (07/20/18 0759)  BP: 130/60 (07/20/18 0338)  SpO2: 95 % (07/20/18 0759) Vital Signs (24h Range):  Temp:  [97.3 °F (36.3 °C)-97.9 °F (36.6 °C)] 97.7 °F (36.5 °C)  Pulse:  [75-85] 82  Resp:  [18-22] 18  SpO2:  [93 %-100 %] 95 %  BP: (130-165)/(60-78) 130/60     Weight: 114 kg (251 lb 5.2 oz)  Body mass index is 40.56 kg/m².    Intake/Output Summary (Last 24 hours) at 07/20/18 0805  Last data filed at 07/19/18 1945   Gross per 24 hour   Intake                0 ml   Output             1251 ml   Net            -1251 ml      Physical Exam   Constitutional: She is oriented to person, place, and time. She appears well-developed and well-nourished. No distress.   Obese female in NAD cooperative with exam    HENT:   Head: Normocephalic and atraumatic.   Eyes: Conjunctivae and EOM are normal. Pupils are equal, round, and reactive to light. No scleral icterus.   Neck: Normal range of motion. Neck supple. No JVD present. No tracheal deviation present.   Cardiovascular: Normal rate, regular rhythm, normal heart sounds and intact distal pulses.  Exam reveals no gallop and no friction rub.    No murmur heard.  2+ distal radial/DT/PT pulses. No carotid bruits.  B/l 2+ non-pitting LE edema; L>R.    Pulmonary/Chest: Effort normal. No  stridor. Respiratory distress: mild. Wheezes: b/l lung fields - mild with inspiration; moderate with expiration  She has no rales.   Scattered exp wheezes     Abdominal: Soft. Bowel sounds are normal. She exhibits no distension. There is no tenderness. There is no guarding.   Neurological: She is alert and oriented to person, place, and time.   Skin: Skin is warm and dry. She is not diaphoretic.   Psychiatric: She has a normal mood and affect. Her behavior is normal. Judgment and thought content normal.   Nursing note and vitals reviewed.      Significant Labs:   CBC: No results for input(s): WBC, HGB, HCT, PLT in the last 48 hours.  CMP:   Recent Labs  Lab 07/18/18  0837 07/19/18  0326 07/20/18  0400   * 137 133*   K 3.8 2.9* 4.4   CL 95 98 96   CO2 31* 30* 31*   * 121* 167*   BUN 29* 8 37*   CREATININE 1.0 0.5 1.0   CALCIUM 8.7 7.9* 8.3*   ANIONGAP 8 9 6*   EGFRNONAA 58* >60 58*     Magnesium:   Recent Labs  Lab 07/19/18  0326   MG 2.1       Significant Imaging: I have reviewed and interpreted all pertinent imaging results/findings within the past 24 hours.    Assessment/Plan:      * Acute diastolic congestive heart failure    dvt bilateral lower extremities negative  -echo showed normal ef, severe left atrial enlargement with grade 2 diastolic dysfunction  With ? Wall motion abnormality  - BNP is elevated at 335   - CXR with evidence of cardiomegaly   - s/p 60 mg lasix administered in ED   - strict I's & O's   - Cardiology following , may be plan for angiogram later   - repeat tolvaptan 45 mg today , monitor diuresis          Acute hypoxemic respiratory failure    Secondary to copd and fluid overload, o2 as needed to keep sats around 92%          COPD exacerbation    - DuoNebs Q4 & Q4PRN  - wean IV steroids rapidly   - discontinue Ceftriaxone  - mucinex bid  - flonase nasal spray bid, will also add cetirizine, robitussin dm  - PRN O2 to maintain O2 sats >88%   - PFTs consistent with severe COPD per  Pulmonary        Acute on Chronic Hyponatremia    - hypervolemic hyponatremia, but much improved  -  followed by Dr. Hernández   - multifactorial 2/2 h/o SIADH & HF   - baseline appears to be ~129 per available labs   - urine osmolality high for hyponatremia , urine sodium less than 20.  - tolvaptan assisted in resolution          Tobacco dependence    Nicotine patch daily          Hypertension, benign    - continue home meds  - hydralazine PRN for SBP > 180  - monitor           VTE Risk Mitigation         Ordered     enoxaparin injection 40 mg  Daily      07/13/18 2313     IP VTE HIGH RISK PATIENT  Once      07/13/18 2313     Place REBECCA hose  Until discontinued      07/13/18 2313     Place sequential compression device  Until discontinued      07/13/18 2313              Sharron Wright MD  Department of Hospital Medicine   [facility]

## 2018-07-20 NOTE — PLAN OF CARE
Problem: Patient Care Overview  Goal: Plan of Care Review  Outcome: Ongoing (interventions implemented as appropriate)  Patient on 3L NC humidified saturations 95% with clear BS and few rales in RLL,aerosol treatment given tolerated well.Will continue therapy as ordered.

## 2018-07-20 NOTE — PT/OT/SLP PROGRESS
Occupational Therapy  Not seen Note    Patient Name:  Nalini Varma   MRN:  [id number]    Pt was not available in the morning due to a procedure and was not seen in the afternoon secondary to Other (Nursing Hold-pt needs bed rest post procedure). Will follow-up on her next scheduled therapy day..    RICHARD Plasencia  7/20/2018

## 2018-07-20 NOTE — PROGRESS NOTES
Progress Note  Pulmonology    Admit Date: 7/13/2018   LOS: 7 days     SUBJECTIVE:   Moving air better-severe copd with bronchial reactivity on pft-cath nl coronaries- mod pulm htn  Continuous Infusions:   sodium chloride 0.9% Stopped (07/20/18 1215)       Scheduled Meds:   albuterol-ipratropium  3 mL Nebulization Q4H    amLODIPine  10 mg Oral Daily    aspirin  81 mg Oral Daily    atorvastatin  40 mg Oral Daily    cefTRIAXone (ROCEPHIN) IVPB  1 g Intravenous Q24H    cetirizine  10 mg Oral Daily    docusate sodium  100 mg Oral Daily    famotidine  20 mg Oral BID    ferrous sulfate  325 mg Oral BID    fluticasone  2 spray Each Nare BID    guaiFENesin  1,200 mg Oral BID    losartan  100 mg Oral Daily    metoprolol succinate  50 mg Oral Daily    nicotine  1 patch Transdermal Daily    polyethylene glycol  17 g Oral Daily    predniSONE  20 mg Oral Daily       Review of Systems:  Constitutional: no fever or chills  Respiratory: positive for dyspnea on exertion  Cardiovascular: no chest pain or palpitations      OBJECTIVE:     Vital Signs Range (Last 24H):  Temp:  [97.3 °F (36.3 °C)-97.7 °F (36.5 °C)]   Pulse:  [74-84]   Resp:  [18-24]   BP: (130-189)/(60-90)   SpO2:  [93 %-100 %]     I & O (Last 24H):    Intake/Output Summary (Last 24 hours) at 07/20/18 1845  Last data filed at 07/20/18 1400   Gross per 24 hour   Intake              540 ml   Output             1350 ml   Net             -810 ml       Physical Exam:  General: no distress, moderately obese  Neck: no jugular venous distention and thyroid not enlarged, symmetric, no tenderness/mass/nodules, trachea midline  Lungs:  diminished breath sounds bilaterally and wheezes bilaterally-much better air movement  Chest Wall: no tenderness  Heart: no S3 or S4  Abdomen: abnormal findings:  obese  Extremities: trace edema  Neurologic: alert, oriented, thought content appropriate    Laboratory:  CBC:     Recent Labs  Lab 07/15/18  0442   WBC 10.78   RBC 3.46*    HGB 10.5*   HCT 31.3*      MCV 91   MCH 30.3   MCHC 33.5     CMP:   Recent Labs  Lab 07/13/18  1903  07/20/18  0400   *  < > 167*   CALCIUM 8.8  < > 8.3*   ALBUMIN 3.4*  --   --    PROT 6.8  --   --    *  < > 133*   K 4.0  < > 4.4   CO2 20*  < > 31*   CL 89*  < > 96   BUN 12  < > 37*   CREATININE 0.8  < > 1.0   ALKPHOS 96  --   --    ALT 14  --   --    AST 15  --   --    BILITOT 0.3  --   --    < > = values in this interval not displayed.  Cardiac markers:     Recent Labs  Lab 07/13/18  1903   TROPONINI 0.014     ABGs:     Recent Labs  Lab 07/17/18  1135   PH 7.408   PCO2 45.0   PO2 71*   HCO3 28.4*   POCSATURATED 94*   BE 4           Diagnostic Results:  CT: Reviewed  X-Ray: Reviewed    ASSESSMENT/PLAN:     Patient Active Problem List   Diagnosis    Chronic obstructive pulmonary disease    Uncomplicated opioid dependence    Hypertension, benign    Anemia    Tobacco dependence    Chronic pain following surgery or procedure    Hyperlipidemia    Osteoporosis    Abnormal ECG    MARY (generalized anxiety disorder)    Acute on Chronic Hyponatremia    Hypomagnesemia    Thyroid nodule s/p biopsy 2017 - benign    Pulmonary nodule    Chronic pain of left knee    Chronic left shoulder pain    Diverticulosis of large intestine without hemorrhage    Hyperkalemia    COPD exacerbation    Acute diastolic congestive heart failure    Acute hypoxemic respiratory failure     Cont o2  Can switch to laba/lama/ics close to dc  Ok to wean to oral steroids/abx  No smoking  Lose weight  cxr stable  Would treat pulm htn with o2 and weight loss  Anasarca better

## 2018-07-20 NOTE — SUBJECTIVE & OBJECTIVE
Interval History: The patient still has some sob, but no audible wheezing.    Review of Systems   Respiratory: Positive for cough and shortness of breath.    Cardiovascular: Positive for leg swelling.     Objective:     Vital Signs (Most Recent):  Temp: 97.5 °F (36.4 °C) (07/20/18 1458)  Pulse: 83 (07/20/18 1610)  Resp: 18 (07/20/18 1610)  BP: (!) 173/83 (07/20/18 1534)  SpO2: 95 % (07/20/18 1610) Vital Signs (24h Range):  Temp:  [97.3 °F (36.3 °C)-97.7 °F (36.5 °C)] 97.5 °F (36.4 °C)  Pulse:  [74-84] 83  Resp:  [18-24] 18  SpO2:  [93 %-100 %] 95 %  BP: (130-189)/(60-90) 173/83     Weight: 114 kg (251 lb 5.2 oz)  Body mass index is 40.56 kg/m².    Intake/Output Summary (Last 24 hours) at 07/20/18 1620  Last data filed at 07/20/18 1400   Gross per 24 hour   Intake              540 ml   Output             1351 ml   Net             -811 ml      Physical Exam   Constitutional: She is oriented to person, place, and time. She appears well-developed and well-nourished. No distress.   Obese female in NAD cooperative with exam    HENT:   Head: Normocephalic and atraumatic.   Eyes: Conjunctivae and EOM are normal. Pupils are equal, round, and reactive to light. No scleral icterus.   Neck: Normal range of motion. Neck supple. No JVD present. No tracheal deviation present.   Cardiovascular: Normal rate, regular rhythm, normal heart sounds and intact distal pulses.  Exam reveals no gallop and no friction rub.    No murmur heard.  2+ distal radial/DT/PT pulses. No carotid bruits.  B/l 2+ non-pitting LE edema; L>R.    Pulmonary/Chest: Effort normal. No stridor. Respiratory distress: mild. Wheezes: b/l lung fields - mild with inspiration; moderate with expiration  She has no rales.   Scattered exp wheezes     Abdominal: Soft. Bowel sounds are normal. She exhibits no distension. There is no tenderness. There is no guarding.   Neurological: She is alert and oriented to person, place, and time.   Skin: Skin is warm and dry. She is not  diaphoretic.   Psychiatric: She has a normal mood and affect. Her behavior is normal. Judgment and thought content normal.   Nursing note and vitals reviewed.      Significant Labs:   CBC: No results for input(s): WBC, HGB, HCT, PLT in the last 48 hours.  CMP:   Recent Labs  Lab 07/19/18  0326 07/20/18  0400    133*   K 2.9* 4.4   CL 98 96   CO2 30* 31*   * 167*   BUN 8 37*   CREATININE 0.5 1.0   CALCIUM 7.9* 8.3*   ANIONGAP 9 6*   EGFRNONAA >60 58*     Magnesium:   Recent Labs  Lab 07/19/18  0326   MG 2.1

## 2018-07-20 NOTE — PROCEDURES
HISTORY OF PRESENT ILLNESS:  The patient is a 67-year-old female, 66 inches   tall, 229 pounds with diagnosis shortness of breath.  The patient has a forced   vital capacity of 1.27 L, which is 41% of predicted, FEV1 is 0.83 which is 37%   of predicted, FEV1/FVC is 92% and FEF 25-75 is 18%.  There is a significant   response to bronchodilators.  Lung volume shows a total lung capacity of 57% of   predicted.  There is air trapping.  There is a decreased expiratory reserve   volume, which can be seen with abdominal obesity or kyphoscoliosis.  Diffusing   capacity corrects for alveolar ventilation.  Flow volume loop is consistent with   above.    ASSESSMENT:  1.  Severe obstructive lung disease with significant response to   bronchodilators.  2.  Moderate restrictive ventilatory defect.  3.  No evidence of a diffusion defect.      CCS/HN  dd: 07/19/2018 07:49:16 (CDT)  td: 07/19/2018 16:06:51 (CDT)  Doc ID   #[id number]  Job ID #[id number]    CC:

## 2018-07-20 NOTE — PT/OT/SLP PROGRESS
Physical Therapy      Patient Name:  Nalini Varma   MRN:  [id number]    Patient not seen today secondary to Unavailable (Comment) (in procedure). Will follow-up 7-21-18.    Boo Atwood, PT

## 2018-07-20 NOTE — SUBJECTIVE & OBJECTIVE
Interval History: Patient is breathing better and cooperating with PT/OT.    Review of Systems   Respiratory: Positive for cough and shortness of breath.    Cardiovascular: Positive for leg swelling.     Objective:     Vital Signs (Most Recent):  Temp: 97.7 °F (36.5 °C) (07/20/18 0338)  Pulse: 82 (07/20/18 0759)  Resp: 18 (07/20/18 0759)  BP: 130/60 (07/20/18 0338)  SpO2: 95 % (07/20/18 0759) Vital Signs (24h Range):  Temp:  [97.3 °F (36.3 °C)-97.9 °F (36.6 °C)] 97.7 °F (36.5 °C)  Pulse:  [75-85] 82  Resp:  [18-22] 18  SpO2:  [93 %-100 %] 95 %  BP: (130-165)/(60-78) 130/60     Weight: 114 kg (251 lb 5.2 oz)  Body mass index is 40.56 kg/m².    Intake/Output Summary (Last 24 hours) at 07/20/18 0805  Last data filed at 07/19/18 1945   Gross per 24 hour   Intake                0 ml   Output             1251 ml   Net            -1251 ml      Physical Exam   Constitutional: She is oriented to person, place, and time. She appears well-developed and well-nourished. No distress.   Obese female in NAD cooperative with exam    HENT:   Head: Normocephalic and atraumatic.   Eyes: Conjunctivae and EOM are normal. Pupils are equal, round, and reactive to light. No scleral icterus.   Neck: Normal range of motion. Neck supple. No JVD present. No tracheal deviation present.   Cardiovascular: Normal rate, regular rhythm, normal heart sounds and intact distal pulses.  Exam reveals no gallop and no friction rub.    No murmur heard.  2+ distal radial/DT/PT pulses. No carotid bruits.  B/l 2+ non-pitting LE edema; L>R.    Pulmonary/Chest: Effort normal. No stridor. Respiratory distress: mild. Wheezes: b/l lung fields - mild with inspiration; moderate with expiration  She has no rales.   Scattered exp wheezes     Abdominal: Soft. Bowel sounds are normal. She exhibits no distension. There is no tenderness. There is no guarding.   Neurological: She is alert and oriented to person, place, and time.   Skin: Skin is warm and dry. She is not  diaphoretic.   Psychiatric: She has a normal mood and affect. Her behavior is normal. Judgment and thought content normal.   Nursing note and vitals reviewed.      Significant Labs:   CBC: No results for input(s): WBC, HGB, HCT, PLT in the last 48 hours.  CMP:   Recent Labs  Lab 07/18/18  0837 07/19/18  0326 07/20/18  0400   * 137 133*   K 3.8 2.9* 4.4   CL 95 98 96   CO2 31* 30* 31*   * 121* 167*   BUN 29* 8 37*   CREATININE 1.0 0.5 1.0   CALCIUM 8.7 7.9* 8.3*   ANIONGAP 8 9 6*   EGFRNONAA 58* >60 58*     Magnesium:   Recent Labs  Lab 07/19/18  0326   MG 2.1       Significant Imaging: I have reviewed and interpreted all pertinent imaging results/findings within the past 24 hours.

## 2018-07-20 NOTE — INTERVAL H&P NOTE
The patient has been examined and the H&P has been reviewed:    I concur with the findings and no changes have occurred since H&P was written.    Anesthesia/Surgery risks, benefits and alternative options discussed and understood by patient/family.          Active Hospital Problems    Diagnosis  POA    *Acute diastolic congestive heart failure [I50.31]  Yes    Acute hypoxemic respiratory failure [J96.01]  Yes    COPD exacerbation [J44.1]  Yes    Acute on Chronic Hyponatremia [E87.1]  Yes    Hypertension, benign [I10]  Yes    Tobacco dependence [F17.200]  Yes      Resolved Hospital Problems    Diagnosis Date Resolved POA   No resolved problems to display.

## 2018-07-20 NOTE — PROGRESS NOTES
Progress Note  Nephrology    Admit Date: 7/13/2018   LOS: 7 days     SUBJECTIVE:     Follow-up For:  Hyponatremia    Interval History:    Awaiting heart cath this am.  Breathing much better and currently off oxygen.  No CP and SOB improved.  Discussed with RN at bedside.  Labs noted.     ROS:      Negative except for above    OBJECTIVE:     Vital Signs Range  Temp:  [97.3 °F (36.3 °C)-97.9 °F (36.6 °C)]   Pulse:  [75-85]   Resp:  [18-24]   BP: (130-166)/(60-78)   SpO2:  [93 %-100 %]     I & O (Last 24H):    Intake/Output Summary (Last 24 hours) at 07/20/18 0802  Last data filed at 07/19/18 1945   Gross per 24 hour   Intake                0 ml   Output             1251 ml   Net            -1251 ml       Physical Exam:  General appearance: obese.   Eyes:  Conjunctivae/corneas clear. PERRL.  Lungs: much less wheezing.    Heart: Regular rate and rhythm, S1, S2 normal, no murmur, rub or dionte. TLC Rt SC  Abdomen: Soft, non-tender non-distended; bowel sounds normal; no masses,  no organomegaly  Extremities: No cyanosis or clubbing. anasarca edema much improved, but still with some edema.    Skin: Skin color, texture, turgor normal. No rashes or lesions  Neurologic: Normal strength and tone. No focal numbness or weakness   pablo    Laboratory Data:  Reviewed and noted  where applicable- Please see chart for full lab data.    Medications:  Medication list was reviewed and changes noted under Assessment/Plan.    ASSESSMENT/PLAN:        1. Acute on chronic Hyponatremia with suspected underlying SIADH with acute volume overload (E87.1, E22.2): CT chest 2/18 had multiple ground glass nodules which need to be followed up. She had a serum Na of 134 on 7/6 and had been started on Torsemide 5mg daily by Dr. Hernández at the end of may for her new onset edema. She had not been paying any attention to her diet or fluid intake increase  - Na improved with tolvaptan.   - hold tolvaptan and began lasix but will hold in preparation of heart  cath.    -Need to monitor as contrast can cause worsening of SIADH.  May need to use samsca again.   -follow trends daily.     2. Acute on Chronic Laura CHF with wall motion abnls/Edema (R60.0): Echo noted. No significant urinary protein. Venous dopplers are negative for DVT. Recent liver U/S earlier this year was not impressive. Tolvaptan helping with fluid removal, but needs more thus started lasix as above. Plans for CV angio noted for today. Defer to Cards.  3. HTN (I10): better overall.   4. Acute exacerbation of COPD/Tobacco dependency (F17.200): Nicotine patch. Nebs, steroids, empiric ABX for exacerbation.  Pulm eval noted and appreciated.    5. Thyroid nodule (E04.1): U/S noted. Previously had FNA recommended for 2.1cm nodule. Not sure this has been done. Defer to primary.   6. Anemia: Likely AOCD.  Mild LESLY.  Oral iron for now.     See above  Will follow from afar for renal needs.

## 2018-07-20 NOTE — NURSING
10-  Patient transported to cath lab by transport. Bilateral DP pulses marked.  No complaints or issues at this time.

## 2018-07-20 NOTE — PROGRESS NOTES
"[facility] Hospital Medicine  Progress Note    Patient Name: Nalini Varma  MRN: [id number]  Patient Class: IP- Inpatient   Admission Date: 7/13/2018  Length of Stay: 7 days  Attending Physician: Sharron Wright MD  Primary Care Provider: Yane Sahni MD        Subjective:     Principal Problem:Acute diastolic congestive heart failure    HPI:  Ms. Nalini Varma is a 67 y.o. female, with PMH of COPD, Diastolic Dysfunction, Chronic Hyponatremia (2/2 SIADH, followed by Dr. Hernández), HTN, CKD, venous insufficiency, who presented to [facility] ED on 7/13/18 2/2 chronic SOB that acutely worsened today. She endorses a sensation of "choking all of the time," and states her SOB worsens if she walks 5' or more or even completes simple transfers from  Bed to bed. She additionally notes bilateral lower extremity swelling x 1 month, with 30 lb weight gain, that worsened in the LLE in the past 1 day. She states she has had increased production of sputum, with yellow color, when she coughs, which is all worse than her baseline. She denies fever, chills, sweats, chest pain, palpitations, use of home O2, abdominal pain, N/V/D, numbness, weakness, dizziness, light headedness, skin color change. She endorses compliance with her diuretic regimen at home.  The patient was evaluated in the ED for her symptoms. SOB evaluated with CXR which showed increased cardiomegaly, BNP elevation to 335, all in the presence of worsening lower extremity edema; consistent with worsening CHF given her h/o diastolic dysfunction. She was diuresed with 60 mg lasix. SOB also treated with Nebs, given her h/o COPD and continued tobacco use. Finally, labs showed a decrease from baseline of her Chronic Hyponatremia at 121, baseline appears to be ~129.     Hospital Course:  67 year old female with chronic hyponatremia, copd, diastolic dysfunction came in with shortness of breath, generalized fluid overload in legs, arms, started " on demadex 5 mg daily about a month ago and denies non-compliance.  She wa given 15 mg tolvaptan on 7/14 and she had a good response.  The patient is responding to diuresis per Nephrology and on Tolvaptan hyponatremia has resolved.  Pulmonary consulted and recommended PFTs which showed severe obstructive lung disease.   COPD exacerbation managed with steroids and bronchodilator treatments.  Cardiology to performed [facility] 7/20/18 which revealed normal coronaries and pulmonary hypertension.       Interval History: The patient still has some sob, but no audible wheezing.    Review of Systems   Respiratory: Positive for cough and shortness of breath.    Cardiovascular: Positive for leg swelling.     Objective:     Vital Signs (Most Recent):  Temp: 97.5 °F (36.4 °C) (07/20/18 1458)  Pulse: 83 (07/20/18 1610)  Resp: 18 (07/20/18 1610)  BP: (!) 173/83 (07/20/18 1534)  SpO2: 95 % (07/20/18 1610) Vital Signs (24h Range):  Temp:  [97.3 °F (36.3 °C)-97.7 °F (36.5 °C)] 97.5 °F (36.4 °C)  Pulse:  [74-84] 83  Resp:  [18-24] 18  SpO2:  [93 %-100 %] 95 %  BP: (130-189)/(60-90) 173/83     Weight: 114 kg (251 lb 5.2 oz)  Body mass index is 40.56 kg/m².    Intake/Output Summary (Last 24 hours) at 07/20/18 1620  Last data filed at 07/20/18 1400   Gross per 24 hour   Intake              540 ml   Output             1351 ml   Net             -811 ml      Physical Exam   Constitutional: She is oriented to person, place, and time. She appears well-developed and well-nourished. No distress.   Obese female in NAD cooperative with exam    HENT:   Head: Normocephalic and atraumatic.   Eyes: Conjunctivae and EOM are normal. Pupils are equal, round, and reactive to light. No scleral icterus.   Neck: Normal range of motion. Neck supple. No JVD present. No tracheal deviation present.   Cardiovascular: Normal rate, regular rhythm, normal heart sounds and intact distal pulses.  Exam reveals no gallop and no friction rub.    No murmur heard.  2+ distal  radial/DT/PT pulses. No carotid bruits.  B/l 2+ non-pitting LE edema; L>R.    Pulmonary/Chest: Effort normal. No stridor. Respiratory distress: mild. Wheezes: b/l lung fields - mild with inspiration; moderate with expiration  She has no rales.   Scattered exp wheezes     Abdominal: Soft. Bowel sounds are normal. She exhibits no distension. There is no tenderness. There is no guarding.   Neurological: She is alert and oriented to person, place, and time.   Skin: Skin is warm and dry. She is not diaphoretic.   Psychiatric: She has a normal mood and affect. Her behavior is normal. Judgment and thought content normal.   Nursing note and vitals reviewed.      Significant Labs:   CBC: No results for input(s): WBC, HGB, HCT, PLT in the last 48 hours.  CMP:   Recent Labs  Lab 07/19/18  0326 07/20/18  0400    133*   K 2.9* 4.4   CL 98 96   CO2 30* 31*   * 167*   BUN 8 37*   CREATININE 0.5 1.0   CALCIUM 7.9* 8.3*   ANIONGAP 9 6*   EGFRNONAA >60 58*     Magnesium:   Recent Labs  Lab 07/19/18  0326   MG 2.1         Assessment/Plan:      * Acute diastolic congestive heart failure    dvt bilateral lower extremities negative  -echo showed normal EF, severe left atrial enlargement with grade 2 diastolic dysfunction  With a wall motion abnormality  - CXR with evidence of cardiomegaly   - strict I's & O's   - Cardiology has concern for undiagnosed CAD as the patient was anasarcic when she initially presented and has risk factors for ischemic heart disease.  LHC done and negative for CAD, but patient has pulmonary hypertension  -patient is not yet euvolemic          Acute hypoxemic respiratory failure    Secondary to copd and fluid overload, o2 as needed to keep sats around 92%  6 min walk test and patient has 87% saturations with activity and rest  Discharge planning in progress for home oxygen therapy        COPD exacerbation    - DuoNebs Q4 & Q4PRN  - wean IV steroids rapidly   - discontinue Ceftriaxone  - mucinex  bid  - flonase nasal spray bid, will also add cetirizine, robitussin dm  - PRN O2 to maintain O2 sats >88%   - PFTs consistent with severe COPD per Pulmonary        Acute on Chronic Hyponatremia    - hypervolemic hyponatremia, but much improved  -  followed by Dr. Hernández   - jcarlos 2/2 h/o SIADH & HF   - baseline appears to be ~129 per available labs   - urine osmolality high for hyponatremia , urine sodium less than 20.  -s/p Tolvaptan and now resolved        Tobacco dependence    Nicotine patch daily  Discussed at length the importance of cessation and the patient voiced an understanding        Hypertension, benign    - continue home meds  - hydralazine PRN for SBP > 180  - monitor           VTE Risk Mitigation         Ordered     IP VTE HIGH RISK PATIENT  Once      07/13/18 2313     Place REBECCA hose  Until discontinued      07/13/18 2313     Place sequential compression device  Until discontinued      07/13/18 2313              Sharron Wright MD  Department of Hospital Medicine   [facility]

## 2018-07-20 NOTE — ASSESSMENT & PLAN NOTE
Secondary to copd and fluid overload, o2 as needed to keep sats around 92%  6 min walk test and patient has 87% saturations with activity and rest  Discharge planning in progress for home oxygen therapy

## 2018-07-20 NOTE — ASSESSMENT & PLAN NOTE
- DuoNebs Q4 & Q4PRN  - wean IV steroids rapidly   - discontinue Ceftriaxone  - mucinex bid  - flonase nasal spray bid, will also add cetirizine, robitussin dm  - PRN O2 to maintain O2 sats >88%   - PFTs consistent with severe COPD per Pulmonary

## 2018-07-20 NOTE — PLAN OF CARE
Ambrocio from [facility] reports bedside commode & home oxygen has been assigned to [facility] - spoke with Maury at [facility] who reports they will deliver to patient at the hospital

## 2018-07-20 NOTE — ASSESSMENT & PLAN NOTE
dvt bilateral lower extremities negative  -echo showed normal EF, severe left atrial enlargement with grade 2 diastolic dysfunction  With a wall motion abnormality  - CXR with evidence of cardiomegaly   - strict I's & O's   - Cardiology has concern for undiagnosed CAD as the patient was anasarcic when she initially presented and has risk factors for ischemic heart disease.  LHC done and negative for CAD, but patient has pulmonary hypertension  -patient is not yet euvolemic

## 2018-07-20 NOTE — PLAN OF CARE
Problem: Patient Care Overview  Goal: Plan of Care Review  Outcome: Ongoing (interventions implemented as appropriate)  Restful night, first time taking ambien.   Up to bedside commode with minimal assistance, fair independent bed mobility, shortness of breath noticed with exertion.   Intermittent use of oxygen as needed to maintain saturations, pablo out and voiding spontaneously, continent.   Free of fall or trauma.

## 2018-07-20 NOTE — ASSESSMENT & PLAN NOTE
Nicotine patch daily  Discussed at length the importance of cessation and the patient voiced an understanding

## 2018-07-20 NOTE — PLAN OF CARE
Problem: Patient Care Overview  Goal: Plan of Care Review  Outcome: Ongoing (interventions implemented as appropriate)  Patient on 3L nc/w humidity. Sats 93 to 100%. Txs given. Aerobika done. Pt. in no distress, will continue to monitor.

## 2018-07-20 NOTE — ASSESSMENT & PLAN NOTE
- hypervolemic hyponatremia, but much improved  -  followed by Dr. Hernández   - multifactorial 2/2 h/o SIADH & HF   - baseline appears to be ~129 per available labs   - urine osmolality high for hyponatremia , urine sodium less than 20.  -s/p Tolvaptan and now resolved

## 2018-07-20 NOTE — PLAN OF CARE
Orders for home oxygen & bedside commode forwarded to N via [employer] - Spoke with Celina who will inform Ambrocio - awaiting agency assignment

## 2018-07-21 VITALS
OXYGEN SATURATION: 94 % | DIASTOLIC BLOOD PRESSURE: 87 MMHG | HEIGHT: 66 IN | HEART RATE: 81 BPM | TEMPERATURE: 97 F | SYSTOLIC BLOOD PRESSURE: 149 MMHG | WEIGHT: 254.44 LBS | BODY MASS INDEX: 40.89 KG/M2 | RESPIRATION RATE: 18 BRPM

## 2018-07-21 PROBLEM — J96.11 CHRONIC RESPIRATORY FAILURE WITH HYPOXIA: Status: ACTIVE | Noted: 2018-07-15

## 2018-07-21 LAB
ANION GAP SERPL CALC-SCNC: 7 MMOL/L
BUN SERPL-MCNC: 38 MG/DL
CALCIUM SERPL-MCNC: 8.2 MG/DL
CHLORIDE SERPL-SCNC: 95 MMOL/L
CO2 SERPL-SCNC: 30 MMOL/L
CREAT SERPL-MCNC: 0.9 MG/DL
EST. GFR  (AFRICAN AMERICAN): >60 ML/MIN/1.73 M^2
EST. GFR  (NON AFRICAN AMERICAN): >60 ML/MIN/1.73 M^2
GLUCOSE SERPL-MCNC: 139 MG/DL
POCT GLUCOSE: 129 MG/DL (ref 70–110)
POCT GLUCOSE: 98 MG/DL (ref 70–110)
POTASSIUM SERPL-SCNC: 4.5 MMOL/L
SODIUM SERPL-SCNC: 132 MMOL/L

## 2018-07-21 PROCEDURE — 94640 AIRWAY INHALATION TREATMENT: CPT

## 2018-07-21 PROCEDURE — 99900035 HC TECH TIME PER 15 MIN (STAT)

## 2018-07-21 PROCEDURE — 25000003 PHARM REV CODE 250: Performed by: INTERNAL MEDICINE

## 2018-07-21 PROCEDURE — 25000003 PHARM REV CODE 250: Performed by: HOSPITALIST

## 2018-07-21 PROCEDURE — 99238 HOSP IP/OBS DSCHRG MGMT 30/<: CPT | Mod: ,,, | Performed by: HOSPITALIST

## 2018-07-21 PROCEDURE — 25000242 PHARM REV CODE 250 ALT 637 W/ HCPCS: Performed by: INTERNAL MEDICINE

## 2018-07-21 PROCEDURE — 63600175 PHARM REV CODE 636 W HCPCS: Performed by: INTERNAL MEDICINE

## 2018-07-21 PROCEDURE — 94761 N-INVAS EAR/PLS OXIMETRY MLT: CPT

## 2018-07-21 PROCEDURE — 80048 BASIC METABOLIC PNL TOTAL CA: CPT

## 2018-07-21 PROCEDURE — 94664 DEMO&/EVAL PT USE INHALER: CPT

## 2018-07-21 PROCEDURE — 27000221 HC OXYGEN, UP TO 24 HOURS

## 2018-07-21 PROCEDURE — 25000003 PHARM REV CODE 250: Performed by: PHYSICIAN ASSISTANT

## 2018-07-21 PROCEDURE — S4991 NICOTINE PATCH NONLEGEND: HCPCS | Performed by: PHYSICIAN ASSISTANT

## 2018-07-21 RX ORDER — TOLVAPTAN 15 MG/1
15 TABLET ORAL ONCE
Status: COMPLETED | OUTPATIENT
Start: 2018-07-21 | End: 2018-07-21

## 2018-07-21 RX ADMIN — FLUTICASONE PROPIONATE 100 MCG: 50 SPRAY, METERED NASAL at 09:07

## 2018-07-21 RX ADMIN — NICOTINE 1 PATCH: 21 PATCH, EXTENDED RELEASE TRANSDERMAL at 09:07

## 2018-07-21 RX ADMIN — ATORVASTATIN CALCIUM 40 MG: 20 TABLET, FILM COATED ORAL at 09:07

## 2018-07-21 RX ADMIN — LOSARTAN POTASSIUM 100 MG: 50 TABLET, FILM COATED ORAL at 09:07

## 2018-07-21 RX ADMIN — IPRATROPIUM BROMIDE AND ALBUTEROL SULFATE 3 ML: .5; 3 SOLUTION RESPIRATORY (INHALATION) at 11:07

## 2018-07-21 RX ADMIN — FAMOTIDINE 20 MG: 20 TABLET ORAL at 09:07

## 2018-07-21 RX ADMIN — CETIRIZINE HYDROCHLORIDE 10 MG: 10 TABLET, FILM COATED ORAL at 09:07

## 2018-07-21 RX ADMIN — IPRATROPIUM BROMIDE AND ALBUTEROL SULFATE 3 ML: .5; 3 SOLUTION RESPIRATORY (INHALATION) at 07:07

## 2018-07-21 RX ADMIN — AMLODIPINE BESYLATE 10 MG: 5 TABLET ORAL at 09:07

## 2018-07-21 RX ADMIN — PREDNISONE 20 MG: 20 TABLET ORAL at 09:07

## 2018-07-21 RX ADMIN — TOLVAPTAN 15 MG: 15 TABLET ORAL at 09:07

## 2018-07-21 RX ADMIN — METOPROLOL SUCCINATE 50 MG: 50 TABLET, EXTENDED RELEASE ORAL at 09:07

## 2018-07-21 RX ADMIN — GUAIFENESIN 1200 MG: 600 TABLET, EXTENDED RELEASE ORAL at 09:07

## 2018-07-21 RX ADMIN — ASPIRIN 81 MG CHEWABLE TABLET 81 MG: 81 TABLET CHEWABLE at 09:07

## 2018-07-21 RX ADMIN — IPRATROPIUM BROMIDE AND ALBUTEROL SULFATE 3 ML: .5; 3 SOLUTION RESPIRATORY (INHALATION) at 04:07

## 2018-07-21 NOTE — PLAN OF CARE
Patient is discharged home with self care. Patient has her home keys. Patient needs a cab ride home and is unable to ride a bus.     LMSW spoke to Kieth at [facility] who approved for portable pull and home delivery for concentrator.   LMSW delivered o2 portable and discharge summary to the patient at the bedside. LMSW educated patient on whom to call at d/c for concentrator.     CM discharge needs addressed.     Floor RN Elizabeth informed of patient transportation need.      07/21/18 1119   Final Note   Assessment Type Final Discharge Note   Discharge Disposition Home   What phone number can be called within the next 1-3 days to see how you are doing after discharge? [phone]

## 2018-07-21 NOTE — NURSING
Discharge home with transport with home O2 tank, bedside commode and personal rolling walker, purse and clothes. Patient will be transported home via taxi-cab via  Hospital voucher.

## 2018-07-21 NOTE — DISCHARGE INSTRUCTIONS
The Benefits of Living Smoke Free  What do you want to gain from quitting? Check off some reasons to quit.  Health benefits  ___  Improve my ability to breathe without coughing or shortness of breath  ___  Reduce my risk of lung cancer, heart disease, chronic lung disease  ___  Have fewer wrinkles and softer skin  ___  Improve my sense of taste and smell  ___  For pregnant women--reduce the risk of having a miscarriage, stillbirth, premature birth, or low-birth-weight baby  Personal benefits  ___  Feel more in control of my life  ___  Have better-smelling hair, breath, clothes, home, and car  ___  Save time by not having to take smoke breaks, buy cigarettes, or hunt for a light  ___  Have whiter teeth  Family benefits  ___  Reduce my childrens respiratory tract infections  ___  Set a good example for my children  ___  Reduce my familys cancer risk  Financial benefits  ___  Save hundreds of dollars each year that would be spent on cigarettes  ___  Save money on medical bills  ___  Save on life, health, and car insurance premiums     Those dollars add up!  Cigarettes are expensive, and getting more expensive all the time. Do you realize how much money you are spending on cigarettes per year? What is the average amount you spend on a pack of cigarettes? What is the average number of packs that you smoke per day? Using your answers to these questions, fill in this formula to help you find out:  ($ _____ per pack) ×  ( _____ number of packs per day) × (365 days) =  $ _____ yearly cost of smoking  Besides tobacco, there are other costs, including extra cleaning bills and replacement costs for clothing and furniture; medical expenses for smoking-related illnesses; and higher health, life, and car insurance premiums.  Cigars and pipes count too!  Cigars and pipes are also dangerous. So are smokeless (chewing) tobacco and snuff. All of these products contain nicotine, a highly addictive substance that has harmful effects  on your body. Quitting smoking means giving up all tobacco products.      For more information  · https://smokefree.gov/jehj-no-lw-expert  · National Cancer [place] Smoking Quitline: 877-44U-QUIT (812-930-3225)   Date Last Reviewed: 2/1/2017  © 0808-8323 [employer]. [address] [place] [address], [place] [address]. All rights reserved. This information is not intended as a substitute for professional medical care. Always follow your healthcare professional's instructions.      Staying Smoke-Free     Deep breathing can help ease the urge to smoke.     Quitting smoking is a big change. People will congratulate you. You have the right to be proud. But later at times you may miss smoking. Plan ahead to resist temptation.  Prepare to be tempted  · If you feel the urge to smoke, distract yourself for about 5 minutes. Drink water. Call a friend, walk around the room, or try deep breathing. Usually, the urge to smoke will pass.  · Dont trust yourself to have just one cigarette. Many ex-smokers get hooked again that way.  · Remind yourself why you quit. Tell yourself you can stay quit.  · Avoid people or places that can trigger you to smoke. Ask others not to smoke in your home or car.  · Spend time in places where you cant smoke-- a museum, a library, a store, or a gym.  · Take your nonsmoking life one day at a time. Parvez each day on your calendar.  · HALT your desire. Keep yourself from feeling too Hungry, Angry, Lonely, or Tired. Deal with your real needs. Eat, talk, or sleep.  · Put aside cigarette money and reward yourself.  If you slip  You may slip and smoke again. Many ex-smokers slip on the way to success. If you do, its not the end of your quit process. Think about what triggered you to smoke. Then think of ways to prevent future slips. Ask yourself what you can learn from the slip. Decide how you will handle this trigger better in the future. Then get back on track--right away!  Dont give up  Keep  telling yourself youre no longer a smoker. Dont lose hope. Most people have tried to quit several times before being successful. Try to stay focused on your plan to be smoke-free. Keep in mind all the benefits of staying quit. Millions of people have given up smoking. You can too.        For more information  · https://smokefree.gov/wizx-as-yh-expert  · National Cancer [place] Smoking Quitline: 877-44U-QUIT (074-846-0371)      Date Last Reviewed: 2/1/2017  © 4201-3430 [employer]. [address] [place] [address]. All rights reserved. This information is not intended as a substitute for professional medical care. Always follow your healthcare professional's instructions.      Coping with Smoking Withdrawal  For the first few days after you quit smoking, you may feel cranky, restless, depressed, or low on energy. These are symptoms of withdrawal. Your body needs time to recover from smoking. Your symptoms should lessen within a few days.    Coping with the urge to smoke  · Deep-breathe. Inhale through your nose. Count to 5. Slowly exhale through your mouth.  · Drink water. Try to drink 8 or more 8-ounce glasses of water a day.  · Keep your hands busy. Wash your car. Draw. Do a puzzle. Build a [employer].  · Delay. The urge to smoke lasts only 3 to 5 minutes.  Get support  Individual, group, and telephone counseling can help keep you on track. Ask your healthcare provider for more information about resources available to you.  Control stress  After you quit, you may feel irritable and stressed. Try taking a warm bath or shower. Listen to music. Go for a walk or to the gym. Try yoga or meditate. Call friends or talk with a professional.  Exercise  Exercise helps your body and mind feel better. There are many ways to be more active. Find something you enjoy doing. See if a friend will join you for a walk or a bike ride.  Sleep better  You may feel tired but have trouble falling asleep. Try to  relax before bed. Do a few stretching exercises. Read for a while. Also, avoid caffeine for at least a few hours before bedtime.  Get fit, not fat  You may notice an increased appetite. Many people who quit smoking gain a few pounds. To limit weight gain, try to watch what you eat. Cut back on fat in your diet. Snack on low-calorie foods, like fresh fruits and vegetables. Drink low-calorie liquids, especially water. Regular exercise can also help you stay fit. And remember: Your main goal is to be a nonsmoker. Stay focused on that goal.  Quit-smoking products  There are a number of products that can help you quit smoking including medicines and nicotine replacement products. They are available over-the-counter or by prescription. Ask your healthcare provider if any of these could help you quit smoking.        For more information  · https://smokefree.gov/fgzi-pe-ln-expert  · National Cancer [place] Smoking Quitline: 877-44U-QUIT (473-917-2219)   Date Last Reviewed: 2/1/2017  © 0423-3546 [employer]. [address] [place] [address], [place] [address]. All rights reserved. This information is not intended as a substitute for professional medical care. Always follow your healthcare professional's instructions.

## 2018-07-21 NOTE — PROGRESS NOTES
Progress Note  Nephrology    Admit Date: 7/13/2018   LOS: 8 days     SUBJECTIVE:     Follow-up For:  Hyponatremia    Interval History:    S/p cardiac cath yesterday.  Breathing much better and currently on NC oxygen. Set up for home O2. No CP and SOB improved. Edema resolved.  ROS:      Negative except for above    OBJECTIVE:     Vital Signs Range  Temp:  [97.2 °F (36.2 °C)-97.8 °F (36.6 °C)]   Pulse:  [74-86]   Resp:  [18-24]   BP: (133-177)/(60-90)   SpO2:  [93 %-98 %]     I & O (Last 24H):    Intake/Output Summary (Last 24 hours) at 07/21/18 1234  Last data filed at 07/21/18 0700   Gross per 24 hour   Intake              295 ml   Output             1150 ml   Net             -855 ml       Physical Exam:  General appearance: obese.   Eyes:  Conjunctivae/corneas clear. PERRL.  Lungs: much less wheezing.    Heart: Regular rate and rhythm, S1, S2 normal, no murmur, rub or dionte. TLC Rt SC  Abdomen: Soft, non-tender non-distended; bowel sounds normal; no masses,  no organomegaly  Extremities: No cyanosis or clubbing. No edema .    Skin: Skin color, texture, turgor normal. No rashes or lesions  Neurologic: Normal strength and tone. No focal numbness or weakness       Laboratory Data:  Reviewed and noted  where applicable- Please see chart for full lab data.    Medications:  Medication list was reviewed and changes noted under Assessment/Plan.    ASSESSMENT/PLAN:        1. Acute on chronic Hyponatremia with suspected underlying SIADH with acute volume overload (E87.1, E22.2): CT chest 2/18 had multiple ground glass nodules which need to be followed up. She had a serum Na of 134 on 7/6 and had been started on Torsemide 5mg daily by Dr. Hernández at the end of may for her new onset edema. She had not been paying any attention to her diet or fluid intake increase  - She has been told that she needs to limit her fluid and Na intake and that if she start swelling again that she needs to present to her PCP sooner rather than  later.      2. Acute on Chronic Laura CHF with wall motion abnls/Edema (R60.0): Echo noted. No significant urinary protein. Venous dopplers are negative for DVT. Recent liver U/S earlier this year was not impressive. Tolvaptan helped with fluid removal, CV angio fairly unremarkable. Defer to Cards.  3. HTN (I10): better overall.   4. Acute exacerbation of COPD/Tobacco dependency (F17.200): Per Pulm   5. Thyroid nodule (E04.1): U/S noted. Previously had FNA recommended for 2.1cm nodule. Not sure this has been done. Defer to primary.   6. Anemia: Likely AOCD.  Mild LESLY.  Oral iron for now.     See above  Stable  For D/C from renal standpoint.

## 2018-07-21 NOTE — PROGRESS NOTES
Physical Therapy Discharge Summary    Name: Nalini Varma  MRN: [id number]   Principal Problem: Acute diastolic congestive heart failure     Patient Discharged from acute Physical Therapy on 18.  Please refer to prior PT noted date on 18 for functional status.     Assessment:     Patient appropriate for care in another setting.    Objective:     GOALS:    Physical Therapy Goals        Problem: Physical Therapy Goal    Goal Priority Disciplines Outcome Goal Variances Interventions   Physical Therapy Goal     PT/OT, PT Ongoing (interventions implemented as appropriate)     Description:  Goals to be met by: 8/10/18     Patient will increase functional independence with mobility by performin. Supine to sit with supervision.   2. Sit to supine with supervision.   3. Sit<>stand transfer with supervision using rollator.   4. Gait > 100 feet with SBA using rollator                     Reasons for Discontinuation of Therapy Services  Transfer to alternate level of care.      Plan:     Patient Discharged to: Home no PT services needed.  PT recommended by PT.    Boo Atwood, PT  2018

## 2018-07-21 NOTE — PLAN OF CARE
Problem: Patient Care Overview  Goal: Plan of Care Review  Outcome: Ongoing (interventions implemented as appropriate)  Patient remains on 2L NC humidified saturations 96% with rhonchi BS,Q4 duo neb treatment given tolerated well.BS clear with cough will continue therapy as ordered.

## 2018-07-21 NOTE — PLAN OF CARE
Problem: Patient Care Overview  Goal: Plan of Care Review  Outcome: Ongoing (interventions implemented as appropriate)  Pt in no distress on 2L NC, txs given and tolerated well.

## 2018-07-21 NOTE — PROGRESS NOTES
"Feels well. "My breathing is so much better!"  Vitals:    07/21/18 0700 07/21/18 0746 07/21/18 0805 07/21/18 1000   BP:   (!) 144/67    BP Location:   Right arm    Patient Position:   Sitting    Pulse: 76 76 84 86   Resp:  18     Temp:   97.2 °F (36.2 °C)    TempSrc:   Oral    SpO2:  96% (!) 94%    Weight:       Height:         Chest clear  Cor: No gallop  Trace ankle edema.  Right groin looks OK.    Discussed heart cath findings with patient.  Again counseled re smoking cessation.  "

## 2018-07-21 NOTE — PLAN OF CARE
Problem: Patient Care Overview  Goal: Plan of Care Review  Outcome: Ongoing (interventions implemented as appropriate)  Plan of care reviewed with patient.  VSS.  Patient transfers to bed-side commode with stand by assist.  Central line dressing c/d/i, all lines patent.  Purposeful rounding completed, call bell within reach, no needs at this time.  Will continue to monitor.

## 2018-07-21 NOTE — PROGRESS NOTES
No new resp complaints    144/67   HR 80    SpO2 97% 2L  NAD  Obese  Heart reg  Lungs diminished  Abd NT  Ext trace edema    A: COPD       S/p LHC - normal coronary arteries  P: should not need tapering of her prednisone (7 day course total)

## 2018-07-21 NOTE — DISCHARGE SUMMARY
"[facility] Hospital Medicine  Discharge Summary      Patient Name: Nalini Varma  MRN: [id number]  Admission Date: 7/13/2018  Hospital Length of Stay: 8 days  Discharge Date and Time:  07/21/2018 10:03 AM  Attending Physician: Sharron Wright MD   Discharging Provider: Sharron Wright MD  Primary Care Provider: Yane Sahni MD      HPI:   Ms. Nalini Varma is a 67 y.o. female, with PMH of COPD, Diastolic Dysfunction, Chronic Hyponatremia (2/2 SIADH, followed by Dr. Hernández), HTN, CKD, venous insufficiency, who presented to [facility] ED on 7/13/18 2/2 chronic SOB that acutely worsened today. She endorses a sensation of "choking all of the time," and states her SOB worsens if she walks 5' or more or even completes simple transfers from  Bed to bed. She additionally notes bilateral lower extremity swelling x 1 month, with 30 lb weight gain, that worsened in the LLE in the past 1 day. She states she has had increased production of sputum, with yellow color, when she coughs, which is all worse than her baseline. She denies fever, chills, sweats, chest pain, palpitations, use of home O2, abdominal pain, N/V/D, numbness, weakness, dizziness, light headedness, skin color change. She endorses compliance with her diuretic regimen at home.  The patient was evaluated in the ED for her symptoms. SOB evaluated with CXR which showed increased cardiomegaly, BNP elevation to 335, all in the presence of worsening lower extremity edema; consistent with worsening CHF given her h/o diastolic dysfunction. She was diuresed with 60 mg lasix. SOB also treated with Nebs, given her h/o COPD and continued tobacco use. Finally, labs showed a decrease from baseline of her Chronic Hyponatremia at 121, baseline appears to be ~129.     Procedure(s) (LRB):  ANGIOGRAM, CORONARY ARTERY (N/A)      Hospital Course:   67 year old female with chronic hyponatremia, copd, diastolic dysfunction came in with shortness of " breath, generalized fluid overload in legs, arms, started on demadex 5 mg daily about a month ago and denies non-compliance.  She was given tolvaptan  and she had a good response with normalization of sodium.  The patient is responding to diuresis per Nephrology.  Pulmonary consulted and recommended PFTs which showed severe obstructive lung disease.   COPD exacerbation managed with steroids and bronchodilator treatments.  Cardiology to performed [facility] 7/20/18 which revealed normal coronaries and pulmonary hypertension.   The patient is strongly encouraged to cease tobacco use.  Her oxygen level decreased to 87% with activity and rest on room air so she will discharge with home oxygen therapy.       Consults:   Consults         Status Ordering Provider     Inpatient consult to Cardiology  Once     Provider:  Blessing Morgan MD    Completed KIET BECERRA     Inpatient consult to Nephrology-[facility]  Once     Provider:  Paulette Story MD    Completed KIET BECERRA     Inpatient consult to Pulmonology  Once     Provider:  Edmar Mead III, MD    Completed BLESSING MORGAN          No new Assessment & Plan notes have been filed under this hospital service since the last note was generated.  Service: Hospital Medicine    Final Active Diagnoses:    Diagnosis Date Noted POA    PRINCIPAL PROBLEM:  Acute diastolic congestive heart failure [I50.31] 07/13/2018 Yes    Chronic respiratory failure with hypoxia [J96.11] 07/15/2018 Yes    COPD exacerbation [J44.1] 07/13/2018 Yes    Acute on Chronic Hyponatremia [E87.1] 12/28/2016 Yes    Hypertension, benign [I10] 03/16/2016 Yes    Tobacco dependence [F17.200] 03/16/2016 Yes      Problems Resolved During this Admission:    Diagnosis Date Noted Date Resolved POA       Discharged Condition: good    Disposition: Home or Self Care    Follow Up:  Follow-up Information     Yane Sahni MD In 1 week.    Specialty:  Internal Medicine  Contact  "information:  [address]  [facility] [address]  [phone]                 Patient Instructions:     COMMODE FOR HOME USE   Order Specific Question Answer Comments   Type: Standard    Height: 5' 6" (1.676 m)    Weight: 114 kg (251 lb 5.2 oz)    Does patient have medical equipment at home? rollator pt owns TTB but is in storage unit and not accessible   Length of need (1-99 months): 99      OXYGEN FOR HOME USE   Order Specific Question Answer Comments   Liter Flow 3    Duration Continuous    Qualifying SpO2: 87%    Testing done at: Exercise/Activity    Device home concentrator with portable unit    Length of need (in months): 99 mos    Patient condition with qualifying saturation CHF    Height: 5' 6" (1.676 m)    Weight: 114 kg (251 lb 5.2 oz)    Does patient have medical equipment at home? rollator pt owns TTB but is in storage unit and not accessible   Alternative treatment measures have been tried or considered and deemed clinically ineffective. Yes      Diet Adult Regular   Order Specific Question Answer Comments   Na restriction, if any: 2gNa      Activity as tolerated         Significant Diagnostic Studies: Labs:   CMP   Recent Labs  Lab 07/20/18  0400 07/21/18  0403   * 132*   K 4.4 4.5   CL 96 95   CO2 31* 30*   * 139*   BUN 37* 38*   CREATININE 1.0 0.9   CALCIUM 8.3* 8.2*   ANIONGAP 6* 7*   ESTGFRAFRICA >60 >60   EGFRNONAA 58* >60    and CBC No results for input(s): WBC, HGB, HCT, PLT in the last 48 hours.    Pending Diagnostic Studies:     Procedure Component Value Units Date/Time    Quantiferon Gold TB [[id number]] Collected:  07/20/18 1406    Order Status:  Sent Lab Status:  In process Updated:  07/20/18 4905    Specimen:  Blood from Blood          Medications:  Reconciled Home Medications:      Medication List      CHANGE how you take these medications    calcium carbonate 500 mg calcium (1,250 mg) chewable tablet  Commonly known as:  OS-SANDRINE  Take 2 tablets by mouth daily as needed " for Heartburn.  What changed:  Another medication with the same name was removed. Continue taking this medication, and follow the directions you see here.     cyanocobalamin (vitamin B-12) 1,000 mcg Tbsr  Take 1,000 mcg by mouth once daily.  What changed:  · when to take this  · reasons to take this        CONTINUE taking these medications    ADVAIR -21 mcg/actuation Hfaa  Generic drug:  fluticasone-salmeterol  Inhale 2 puffs into the lungs every 12 (twelve) hours.     alendronate 70 MG tablet  Commonly known as:  FOSAMAX  Take 1 tablet (70 mg total) by mouth every 7 days. on Wednesday     amLODIPine 10 MG tablet  Commonly known as:  NORVASC  TAKE 1 TABLET BY MOUTH ONCE DAILY     aspirin 81 MG EC tablet  Commonly known as:  ECOTRIN  Take 81 mg by mouth once daily.     atenolol 50 MG tablet  Commonly known as:  TENORMIN  Take 1 tablet (50 mg total) by mouth once daily.     atorvastatin 40 MG tablet  Commonly known as:  LIPITOR  TAKE 1 TABLET BY MOUTH EVERY DAILY     famotidine 20 MG tablet  Commonly known as:  PEPCID  TAKE 1 TABLET BY MOUTH TWO TIMES A DAY     fluticasone 50 mcg/actuation nasal spray  Commonly known as:  FLONASE  1 spray by Each Nare route 2 (two) times daily as needed for Rhinitis.     HYDROcodone-acetaminophen  mg per tablet  Commonly known as:  NORCO  Take 1 tablet by mouth every 12 hours as needed for pain     INCRUSE ELLIPTA 62.5 mcg/actuation Dsdv  Generic drug:  umeclidinium  Inhale 1 puff into the lungs once daily. Controller     loratadine 10 mg tablet  Commonly known as:  CLARITIN  Take 10 mg by mouth once daily.     magnesium oxide 400 mg Cap  Take 1 capsule by mouth once daily.     torsemide 5 MG Tab  Commonly known as:  DEMADEX  Take 1 tablet (5 mg total) by mouth once daily.     VENTOLIN HFA 90 mcg/actuation inhaler  Generic drug:  albuterol  inhale 1-2 puffs as needed every 6 hours for wheezing and shortness of breath     walker Misc  Commonly known as:  ULTRA-LIGHT  ROLLATOR  1 each by Misc.(Non-Drug; Combo Route) route once daily.            Indwelling Lines/Drains at time of discharge:   Lines/Drains/Airways     Central Venous Catheter Line                 Percutaneous Central Line Insertion/Assessment - triple lumen  07/14/18 1200 right subclavian 6 days                Time spent on the discharge of patient: 25 minutes  Patient was seen and examined on the date of discharge and determined to be suitable for discharge.         Sharron Wright MD  Department of Hospital Medicine  [facility]

## 2018-07-21 NOTE — NURSING
Triple lumen catheter dc'd at this time. Pressure drsg applied to rt chest wall. Small amt of scant serousagious drainage noted from the site onto the dressing. Patient tolerated procedure well. Discharge instructions received by respiratory therapy regarding the oxygen tank and how to operate it. Patient voiced understanding. O2 noted @3l via nasal cannula. Home discharge instructions given at this time and patient voiced understanding. pATIENT EATING LUNCH WITHOUT DISTRESS.

## 2018-07-22 NOTE — PT/OT/SLP DISCHARGE
Occupational Therapy Discharge Summary    Nalini Varma  MRN: [id number]   Principal Problem: Acute diastolic congestive heart failure      Patient Discharged from acute Occupational Therapy on 7/21/19.  Please refer to prior OT note dated 7/18/18 for functional status.    Assessment:      Patient appropriate for care in another setting.    Objective:     GOALS:    Occupational Therapy Goals        Problem: Occupational Therapy Goal    Goal Priority Disciplines Outcome Interventions   Occupational Therapy Goal     OT, PT/OT Ongoing (interventions implemented as appropriate)    Description:  Goals to be met by 8/18/18  1. Set-up G/H (3 tasks) sitting EOB  2. SBA UBD (seated)  3. Mod A clean (dry shampoo) and brush/lcomb hair  4. Assess toilet hygiene  5. Min A pace activity for SOB during therapy session                    Reasons for Discontinuation of Therapy Services  hospital discharge      Plan:     Patient Discharged to: home 02 and BSC, Home Health PT/OT recommended    RICHARD Plasencia  7/22/2018

## 2018-07-23 ENCOUNTER — PATIENT MESSAGE (OUTPATIENT)
Dept: INTERNAL MEDICINE | Facility: CLINIC | Age: 68
End: 2018-07-23

## 2018-07-23 ENCOUNTER — TELEPHONE (OUTPATIENT)
Dept: INTERNAL MEDICINE | Facility: CLINIC | Age: 68
End: 2018-07-23

## 2018-07-23 DIAGNOSIS — Z99.81 ON HOME OXYGEN THERAPY: ICD-10-CM

## 2018-07-23 DIAGNOSIS — J44.9 CHRONIC OBSTRUCTIVE PULMONARY DISEASE, UNSPECIFIED COPD TYPE: Primary | ICD-10-CM

## 2018-07-23 LAB
MITOGEN IGNF BCKGRD COR BLD-ACNC: 0.15 IU/ML
MITOGEN NIL: 2.57 IU/ML
TB GOLD PLUS: NEGATIVE
TB1 - NIL: -0.07 IU/ML
TB2 - NIL: -0.13 IU/ML

## 2018-07-23 NOTE — TELEPHONE ENCOUNTER
----- Message from Annette Pinto sent at 7/23/2018 11:20 AM CDT -----  Contact: Pt  Name of Who is Calling: ELIEZER RODRIGUEZ [[id number]]    What is the request in detail: Patient states she would like a call back to schedule a 1 week hospital follow up appointment patient is also requeting an order for a  portable oxygen machine...Please contact to further discuss and advise     Can the clinic reply by [facility]: Yes    What [facility]: [phone]

## 2018-07-23 NOTE — TELEPHONE ENCOUNTER
Please see my response in telephone message from today about orders, concentrator, order to Case mgnt and adding pt to my schedule for wednesday

## 2018-07-23 NOTE — PLAN OF CARE
07/23/18 0945   Medicare Message   Important Message from Medicare regarding Discharge Appeal Rights Given to patient/caregiver;Explained to patient/caregiver;Signed/date by patient/caregiver   Date IMM was signed 07/20/18   Time IMM was signed 1111

## 2018-07-23 NOTE — TELEPHONE ENCOUNTER
Spoke with pt and got the details. She needs a portable concentrator that she can carry. The one they delivered for a portable was one on wheels. She uses a walker and unable to pull the o2 tank on wheels. Routed message to Dr. Sahni what she needs and also told her she needs 3 days notice on appt for transportation reasons.

## 2018-07-23 NOTE — TELEPHONE ENCOUNTER
----- Message from Annette Pinto sent at 7/23/2018 11:20 AM CDT -----  Contact: Pt  Name of Who is Calling: ELIEZER RODRIGUEZ [[id number]]    What is the request in detail: Patient states she would like a call back to schedule a 1 week hospital follow up appointment patient is also requeting an order for a  portable oxygen machine...Please contact to further discuss and advise     Can the clinic reply by [facility]: Yes    What [facility]: [phone]

## 2018-07-23 NOTE — PHYSICIAN QUERY
PT Name: Nalini Varma  MR #: [id number]    Physician Query Form - Respiratory Condition Clarification      CDS/: Elina Taylor                 Contact information:[email or web address]     This form is a permanent document in the medical record.    Query Date: July 23, 2018    By submitting this query, we are merely seeking further clarification of documentation. Please utilize your independent clinical judgment when addressing the question(s) below.    The Medical record contains the following   Indicators   Supporting Clinical Findings Location in Medical Record   x   SOB, MEDELLIN, Wheezing, Productive Cough, Use of Accessory Muscles, etc. SOB that acutely worsened today   H&P      Acute/Chronic Illness        Radiology Findings     x   Respiratory Distress or Failure Acute respiratory failure      Chronic respiratory failure HM progress note 7-16      Discharge summary      Hypoxia or Hypercapnia     x   RR         ABGs         O2 sat RR=14 to 32  O2 sat=85-96%    ABG  PH=7.333  PCO2=41.6  PO2=68 Vs record 7-13 to 7-14      ABG on 5lnc 7-13      BiPAP/Intubation     x   Supplemental O2 2-5LNC Vs record 7-13 to 7-14      Home O2, Oxygen Dependence        Treatment     x   Other She denies fever, chills, sweats, chest pain, palpitations, use of home O2   H&P     Respiratory failure can be acute, chronic or both. It is generally further specificed as hypoxic, hypercapnic or both. Lastly, it is important to identify an etiology, if known or suspected.   References:: https://www.acphospitalist.org/archives/2013/10/coding; htm; http://Freenom.iDoneThis/acute-respiratory-failure-know    The clinical guidelines noted below are only system guidelines, and do not replace the providers clinical judgment.    Provider, please specify diagnosis or diagnoses associated with above clinical findings.     [  x  ] Acute Respiratory Failure with Hypoxia - ABG pO2 < 60 mmHg or O2 sat of 88% on RA and respiratory  symptoms documented  [    ] Chronic Respiratory Failure with Hypoxia -Continuous home oxygen use  [    ] Other Respiratory Diagnosis (please specify): _________________________________  [    ] Clinically Undetermined    Please document in your progress notes daily for the duration of treatment until resolved and include in your discharge summary.

## 2018-07-23 NOTE — TELEPHONE ENCOUNTER
Sent message to pt letting her know that we were checking into get her an appt. Ask pt if she had home o2 before going into the hospital.

## 2018-07-23 NOTE — TELEPHONE ENCOUNTER
"Ok to add pt to my schedule for this Wednesday at 12:00pm or 7:40am - whichever is easier for her    - per review of chart - look under "notes" tab and then 'plan of care' note for 7/21/18 - pt was discharged with home oxygen - per review of that note pt was given O2 portable and educated on whom to call for concentrator - I suspect she needs the concentrator - please arrange appt an inquire if obtaining the concentrator is the issue     - reordered home oxygen with concentrator in case a rx for all is needed  - please ask pt to review discharge paperwork to see if has contact # to obtain concentrator  - case management referral signed - we may need to call them today to assist with this if pt is unsure of number for additional equipment (concentrator)  "

## 2018-07-23 NOTE — TELEPHONE ENCOUNTER
----- Message from Annette Pinto sent at 7/23/2018 11:20 AM CDT -----  Contact: Pt  Name of Who is Calling: ELIEZER RODRIGUEZ [[id number]]    What is the request in detail: Patient states she would like a call back to schedule a 1 week hospital follow up appointment patient is also requeting an order for a  portable oxygen machine...Please contact to further discuss and advise     Can the clinic reply by [facility]: Yes    What [facility]: [phone]

## 2018-07-23 NOTE — TELEPHONE ENCOUNTER
Order signed and per email from today orders for home oxygen were sent to [facility] - please confirm with Shruthi that all aspects of orders were completed - thanks!     Ok to add to my schedule for the 31st at 7:40a or 12:00pm - this should give her enough time to arrange transportation

## 2018-07-23 NOTE — TELEPHONE ENCOUNTER
----- Message from Annette Pinto sent at 7/23/2018 11:20 AM CDT -----  Contact: Pt  Name of Who is Calling: ELIEZER RODRIGUEZ [[id number]]    What is the request in detail: Patient states she would like a call back to schedule a 1 week hospital follow up appointment patient is also requeting an order for a  portable oxygen machine...Please contact to further discuss and advise     Can the clinic reply by [facility]: Yes    What [facility] Back [facility]: [phone]

## 2018-07-24 ENCOUNTER — TELEPHONE (OUTPATIENT)
Dept: INTERNAL MEDICINE | Facility: CLINIC | Age: 68
End: 2018-07-24

## 2018-07-24 ENCOUNTER — PATIENT OUTREACH (OUTPATIENT)
Dept: ADMINISTRATIVE | Facility: CLINIC | Age: 68
End: 2018-07-24

## 2018-07-24 ENCOUNTER — NURSE TRIAGE (OUTPATIENT)
Dept: ADMINISTRATIVE | Facility: CLINIC | Age: 68
End: 2018-07-24

## 2018-07-24 RX ORDER — CHOLECALCIFEROL (VITAMIN D3) 25 MCG
1000 TABLET ORAL DAILY
Status: ON HOLD | COMMUNITY
End: 2021-09-07 | Stop reason: SDUPTHER

## 2018-07-24 RX ORDER — GUAIFENESIN 600 MG/1
1200 TABLET, EXTENDED RELEASE ORAL 2 TIMES DAILY PRN
Status: ON HOLD | COMMUNITY
End: 2023-06-15

## 2018-07-24 RX ORDER — NICOTINE POLACRILEX 2 MG
GUM BUCCAL
Status: ON HOLD | COMMUNITY
End: 2021-05-07 | Stop reason: HOSPADM

## 2018-07-24 NOTE — TELEPHONE ENCOUNTER
----- Message from Justin Perry sent at 7/24/2018 11:10 AM CDT -----            Name of Who is Calling: Rosy([facility])      What is the request in detail: Rep states they need an order for pulse dose portable oxygen faxed to [phone]. It also needs to include the medical necessity form. Please call if there are any questions.      Can the clinic reply by [facility]: no      What Number to Call Back [facility] not [facility] [place] [facility][phone]

## 2018-07-24 NOTE — TELEPHONE ENCOUNTER
Received call from [facility] nurse via front end - pt discharged from hospital Saturday-dx CHF. Reporting leg swelling.     Pt reported she went to hospital with breathing difficulty- does have COPD. At the time she had swelling in both legs, face, arms. Swelling currently resolved in face/arms/hands and denied any new/worsenng breathing difficulty. Pt reports she does sit up most of day, doesn't find elevation helps much. Noted onset of swelling again in right ankle that started yesterday- no swelling in foot/leg and no discoloration. Does have pain when bending ankle which had started prior to hospitalization. Left leg swollen from ankle to above knee - has had left knee replacement which was done and hx of crush fx to left femur so has chronic pain and unable to walk without assist. Stated it is hard/painful to elevate left leg. -  Pt reported she was dx with CHF but stated her heart is fine- problem is her COPD and when seen by nephrologist stated her kidneys are good but there was a problem with her brain/body related to Na and how it is processed.   Pt has appt for f/u 7/31/18- cannot go sooner due to transportation problems. Stated she just wants to know what she can do to prevent this fluid from building up like it did causing her hospitalization. --     Advised I would send message to 's office for any recommendations prior to appt. Advised if she develops worsening fluid build-up/swelling or breathing difficulty to go to ER

## 2018-07-24 NOTE — TELEPHONE ENCOUNTER
rec pt cont to take torsemide to remove salt water from body   rec cont to increase lean protein in the diet (egg whites, baked chicken or fish)  rec elevate legs when possible   rec follow strict low salt diet - prepare all meals and avoid all prepared/packaged meals and avoid all food from restaurants as these are all high in salt.   - if any new sob, chest pain, changes in breathing status or swelling then rec she be evaluated asap in clinic/UC/ER

## 2018-07-24 NOTE — PATIENT INSTRUCTIONS
Left- or Right- Side Congestive Heart Failure (CHF)    The heart is a large muscle. It is a pump that circulates blood throughout the body. Blood carries oxygen to all of the organs, including the brain, muscles, and skin. After your body takes the oxygen out of the blood, the blood returns to the heart. The right side of the heart collects the blood from the body and pumps it to the lungs. In the lungs, it gets fresh oxygen and gives up carbon dioxide. The oxygen-rich blood from the lungs then returns to the left side of the heart, where it is pumped back out to the rest of your body, starting the process all over.  Congestive heart failure (CHF) occurs when the heart muscle is weakened. This affects the pumping action of the heart. Heart failure can affect the right side of the heart or the left side. But heart failure may affect not only the right side of the heart or only the left side. Although it may have started on one side, it can and often eventually does affect both sides.  Right-side heart failure  When the right side of the heart is weakened, it cant handle the blood it is getting from the rest of the body. This blood returns to the heart through veins. When too much pressure builds up in the veins, fluid leaks out into the tissues. Gravity then causes that fluid to move to those parts of the body that are the lowest. So one of the first symptoms of right-side CHF can include swelling in the feet and ankles. If the condition gets worse, the swelling can even go up past the knees. Sometimes it gets so severe, the liver can get congested as well.  Left-side heart failure  When the left side of the heart is weakened, it cant handle the blood it gets from the lungs. Pressure then builds up in the veins of the lungs, causing fluid to leak into the lung tissues. This may cause CHF and pulmonary edema. This causes you to feel short of breath, weak, or dizzy. These symptoms are often worse with exertion,  such as when climbing stairs or walking up hills. Lying with your head flat is uncomfortable and can make your breathing worse. This may make sleeping difficult. You may need to use extra pillows to elevate your upper body to sleep well. The same is true when just resting during the daytime.  There are many causes of heart failure including:  · Coronary artery disease  · Past heart attack (also known as acute myocardial infarction, or AMI)  · High blood pressure  · Damaged heart valve  · Diabetes  · Obesity  · Cigarette smoking  · Alcohol abuse  Heart failure is a chronic condition. There is no cure. The purpose of medical treatment is to improve the pumping action of the heart. The main way to do this is to remove excess water from the body. A number of medicines can help reach this goal, improve symptoms, and prevent the heart from becoming weaker. Sometimes, heart failure can become so severe that a device is placed in the heart to help with pumping. Another major goal is to better treat the causes of heart failure, such as diabetes and high blood pressure, by making changes in your lifestyle and maximizing medical control when needed.  Home care  Follow these guidelines when caring for yourself at home:  · Check your weight every day. This is very important because a sudden increase in weight gain could mean worsening heart failure. Keep these things in mind:  ¨ Use the same scale every day.  ¨ Weigh yourself at the same time every day.  ¨ Make sure the scale is on a hard floor surface, not on a rug or carpet.  ¨ Keep a record of your weight every day so your healthcare provider can see it. If you are not given a log sheet for this, keep a separate journal for this purpose.   · Cut back on the amount of salt (sodium) you eat. Follow your healthcare provider's recommendation on how much salt or sodium you should have each day.  ¨ Avoid high-salt foods. These include olives, pickles, smoked meats, salted potato  chips, and most prepared foods.  ¨ Don't add salt to your food at the table. Use only small amounts of salt when cooking.  ¨ Read the labels carefully on food packages to learn how much salt or sodium is in each serving in the package. Remember, a can or package of food may contain more than 1 serving. So if you eat all the food in the package, you may be getting more salt than you think.  · Follow your healthcare provider's recommendations about how much fluid you should have. Be aware that some foods, such as soup, pudding, and juicy fruits like oranges or melons, contain liquid. You'll need to count the liquid in those foods as part of your daily fluid intake. Your provider can help you with this.  · Stop smoking.  · Cut back on how much alcohol you drink.  · Lose weight if you are overweight. The excess weight adds a lot of stress on the workload of the heart.  · Stay active. Talk with your provider about an exercise program that is safe for your heart.  · Keep your feet elevated to reduce swelling. Ask your provider about support hose as a preventive treatment for daytime leg swelling.  Besides taking your medicine as instructed, an important part of treatment is lifestyle changes. These include diet, physical activity, stopping smoking, and weight control.  Improve your diet by including more fresh foods, cutting back on how much sugar and saturated fat you eat, and eating fewer processed foods and less salt.  Follow-up care  Follow up with your healthcare provider, or as advised.  Make sure to keep any appointments that were made for you. These can help better control your congestive heart failure. You will need to follow up with your provider on a routine basis to make sure your heart failure is well managed.  If an X-ray, ECG, or other tests were done, you will be told of any new findings that may affect your care.  Call 911  Call 911 if you:  · Become severely short of breath  · Feel lightheaded, or feel  like you might pass out or faint  · Have chest pain or discomfort that is different than usual, the medicines your doctor told you to use for this don't help, or the pain lasts longer than 10 to 15 minutes  · You suddenly develop a rapid heart rate  When to seek medical advice  The following may be signs that your heart failure is getting worse. Call your healthcare provider right away if any of these happen:  · Sudden weight gain. This means 3 or more pounds in one day, or 5 or more pounds in 1 week.  · Trouble breathing not related to being active  · New or increased swelling of your legs or ankles  · Swelling or pain in your abdomen  · Breathing trouble at night. This means waking up short of breath or needing more pillows to breathe.  · Frequent coughing that doesnt go away  · Feeling much more tired than usual  Date Last Reviewed: 1/4/2016  © 1070-6459 [employer]. [address] [place] [address], [place] [address]. All rights reserved. This information is not intended as a substitute for professional medical care. Always follow your healthcare professional's instructions.

## 2018-07-25 NOTE — TELEPHONE ENCOUNTER
Called pt and informed her that the doctor stated   rec pt cont to take torsemide to remove salt water from body   rec cont to increase lean protein in the diet (egg whites, baked chicken or fish)  rec elevate legs when possible   rec follow strict low salt diet - prepare all meals and avoid all prepared/packaged meals and avoid all food from restaurants as these are all high in salt.   - if any new sob, chest pain, changes in breathing status or swelling then rec she be evaluated asap in clinic/UC/ER . Pt verbally agreed

## 2018-07-31 ENCOUNTER — OFFICE VISIT (OUTPATIENT)
Dept: INTERNAL MEDICINE | Facility: CLINIC | Age: 68
End: 2018-07-31
Attending: INTERNAL MEDICINE
Payer: MEDICARE

## 2018-07-31 VITALS
HEART RATE: 80 BPM | DIASTOLIC BLOOD PRESSURE: 76 MMHG | OXYGEN SATURATION: 95 % | BODY MASS INDEX: 42.45 KG/M2 | WEIGHT: 264.13 LBS | HEIGHT: 66 IN | SYSTOLIC BLOOD PRESSURE: 154 MMHG

## 2018-07-31 DIAGNOSIS — F17.200 TOBACCO DEPENDENCE: ICD-10-CM

## 2018-07-31 DIAGNOSIS — I10 HYPERTENSION, BENIGN: Primary | ICD-10-CM

## 2018-07-31 DIAGNOSIS — J90 PLEURAL EFFUSION: ICD-10-CM

## 2018-07-31 DIAGNOSIS — J44.9 CHRONIC OBSTRUCTIVE PULMONARY DISEASE, UNSPECIFIED COPD TYPE: ICD-10-CM

## 2018-07-31 DIAGNOSIS — I27.20 PULMONARY HYPERTENSION: ICD-10-CM

## 2018-07-31 DIAGNOSIS — R60.9 EDEMA, UNSPECIFIED TYPE: ICD-10-CM

## 2018-07-31 DIAGNOSIS — E87.1 HYPONATREMIA: ICD-10-CM

## 2018-07-31 PROCEDURE — 3077F SYST BP >= 140 MM HG: CPT | Mod: CPTII,S$GLB,, | Performed by: INTERNAL MEDICINE

## 2018-07-31 PROCEDURE — 99214 OFFICE O/P EST MOD 30 MIN: CPT | Mod: S$GLB,,, | Performed by: INTERNAL MEDICINE

## 2018-07-31 PROCEDURE — 3078F DIAST BP <80 MM HG: CPT | Mod: CPTII,S$GLB,, | Performed by: INTERNAL MEDICINE

## 2018-07-31 PROCEDURE — 99999 PR PBB SHADOW E&M-EST. PATIENT-LVL IV: CPT | Mod: PBBFAC,,, | Performed by: INTERNAL MEDICINE

## 2018-07-31 RX ORDER — TORSEMIDE 10 MG/1
10 TABLET ORAL DAILY
Qty: 90 TABLET | Refills: 0 | Status: SHIPPED | OUTPATIENT
Start: 2018-07-31 | End: 2018-08-17 | Stop reason: SDUPTHER

## 2018-07-31 NOTE — PROGRESS NOTES
"Subjective:   Patient ID: Nalini Varma is a 67 y.o. female  Chief complaint:   Chief Complaint   Patient presents with    Edema     ER f/u       HPI  Pt here for hospital discharge f/u - dc summary reviewed   Had LHC and RHC performed and showed normal coronaries, pHTN. Pt has hx of HTN, diastolic dysfunction and LE edema.   Pt diuresed and continued on diuretic.   Reports has been retaining more fluid - reports is compliant with taking medication and following low salt diet on most days - will eat a couple of pieces of veronica 1-2 times per week  Does not have scale at home   Breathing stable.     COPD with hypoxia:   Met criteria for home oxygen during 6 min walk while inpt - has home oxygen and portable oxygen ordered pending insurance approval.     did stop smoking!    HTN:   Elevated today - taking amlodipine, atenolol, torsemide  Stopped lisinopril - 2/2 to hyperkalemia  Stopped hctz due to hyponatremia  nelson current meds   Signed up for digital htn program - reports is going this week to obtain cuff and start  Hyponatremia: stable per recent labs    Needs to rescheduled thyroid US and vascular appt     Review of Systems    Objective:  Vitals:    07/31/18 1149   BP: (!) 154/76   Pulse: 80   SpO2: 95%   Weight: 119.8 kg (264 lb 1.8 oz)   Height: 5' 6" (1.676 m)     Body mass index is 42.63 kg/m².    Physical Exam   Constitutional: She is oriented to person, place, and time. She appears well-developed and well-nourished.   HENT:   Head: Normocephalic and atraumatic.   Eyes: Conjunctivae and EOM are normal.   Neck: Normal range of motion. Neck supple.   Cardiovascular: Normal rate, regular rhythm and intact distal pulses.    Pulmonary/Chest: Effort normal and breath sounds normal.   Abdominal: Soft. Normal appearance and bowel sounds are normal.   Musculoskeletal: She exhibits edema. She exhibits no tenderness.   Mild Edema at ankles   No inc warmth    Neurological: She is alert and oriented to person, place, " and time. She has normal strength. Gait normal.   Skin: Skin is warm, dry and intact. No cyanosis. Nails show no clubbing.   Psychiatric: She has a normal mood and affect. Her speech is normal and behavior is normal. Cognition and memory are normal.   Vitals reviewed.      Assessment:  1. Pulmonary hypertension    2. Edema, unspecified type    3. Chronic obstructive pulmonary disease, unspecified COPD type    4. Tobacco dependence    5. Hypertension, benign    6. Hyponatremia    7. Pleural effusion        Plan:  Nalini was seen today for edema.    Diagnoses and all orders for this visit:    Pulmonary hypertension  Referred to pulm   Pt recently stopped smoking     Edema, unspecified type  -     torsemide (DEMADEX) 10 MG Tab; Take 1 tablet (10 mg total) by mouth once daily.  Inc torsemide - obtaain scale and weigh self daily  Check bmp in 1 week     Chronic obstructive pulmonary disease, unspecified COPD type  -     Ambulatory Referral to Pulmonology  Cont meds   Cont to not smoke    Tobacco dependence  Counseled to abstain from tobacco     Hypertension, benign  Uncontrolled, inc torsemide - cont low salt diet - avoid salty foods/veronica  Enroll in dig htn program for further monitoring - pt agrees to go this week     Hyponatremia  -     Basic metabolic panel; Future    Pleural effusion:   -     X-Ray Chest PA And Lateral; Future  Check cxr in 4 weeks to eval for resolution       Health Maintenance   Topic Date Due    Influenza Vaccine  08/01/2018    Mammogram  03/21/2019    Colonoscopy  05/22/2020    DEXA SCAN  08/23/2020    Lipid Panel  04/24/2023    TETANUS VACCINE  01/29/2025    Hepatitis C Screening  Completed    Zoster Vaccine  Completed    Pneumococcal (65+)  Completed

## 2018-08-01 ENCOUNTER — TELEPHONE (OUTPATIENT)
Dept: INTERNAL MEDICINE | Facility: CLINIC | Age: 68
End: 2018-08-01

## 2018-08-01 NOTE — TELEPHONE ENCOUNTER
Elizabeth from [facility] called in stating that the order for pt oxygen was incorrect .  Elizabeth stated that they already have an order for her oxygen for home use.   They need another order to say portable oxygen concentrator and PCP has to pick if its continually or pulse dose.  Please authorize and advise

## 2018-08-01 NOTE — TELEPHONE ENCOUNTER
----- Message from Ann Cameron sent at 8/1/2018 11:01 AM CDT -----  Contact: Elizabeth  Name of Who is Calling: Elizabeth with [employer]       What is the request in detail: Elizabeth with [employer] [facility] is requesting a callback from staff about an order they received. Please contact to further discuss and advise      Can the clinic reply by [facility]: No     What Number to Call Back if not in [facility]: [phone]

## 2018-08-13 ENCOUNTER — PATIENT MESSAGE (OUTPATIENT)
Dept: INTERNAL MEDICINE | Facility: CLINIC | Age: 68
End: 2018-08-13

## 2018-08-13 RX ORDER — HYDROCODONE BITARTRATE AND ACETAMINOPHEN 10; 325 MG/1; MG/1
1 TABLET ORAL EVERY 12 HOURS
Qty: 60 TABLET | Refills: 0 | Status: SHIPPED | OUTPATIENT
Start: 2018-08-13 | End: 2018-09-13 | Stop reason: SDUPTHER

## 2018-08-13 NOTE — TELEPHONE ENCOUNTER
Spoke w/ pt and confirmed that RX has been filled and sent to the pharmacy   Pt verbally agree and has no further questions

## 2018-08-16 ENCOUNTER — HOSPITAL ENCOUNTER (OUTPATIENT)
Dept: RADIOLOGY | Facility: OTHER | Age: 68
Discharge: HOME | End: 2018-08-16
Attending: INTERNAL MEDICINE
Payer: MEDICARE

## 2018-08-16 DIAGNOSIS — E04.1 THYROID NODULE: ICD-10-CM

## 2018-08-16 DIAGNOSIS — J90 PLEURAL EFFUSION: ICD-10-CM

## 2018-08-16 PROCEDURE — 71046 X-RAY EXAM CHEST 2 VIEWS: CPT | Mod: 26,,, | Performed by: RADIOLOGY

## 2018-08-16 PROCEDURE — 76536 US EXAM OF HEAD AND NECK: CPT | Mod: TC

## 2018-08-16 PROCEDURE — 76536 US EXAM OF HEAD AND NECK: CPT | Mod: 26,,, | Performed by: RADIOLOGY

## 2018-08-16 PROCEDURE — 71046 X-RAY EXAM CHEST 2 VIEWS: CPT | Mod: TC,FY

## 2018-08-17 ENCOUNTER — OFFICE VISIT (OUTPATIENT)
Dept: PULMONOLOGY | Facility: CLINIC | Age: 68
End: 2018-08-17
Payer: MEDICARE

## 2018-08-17 ENCOUNTER — TELEPHONE (OUTPATIENT)
Dept: INTERNAL MEDICINE | Facility: CLINIC | Age: 68
End: 2018-08-17

## 2018-08-17 VITALS
BODY MASS INDEX: 43.58 KG/M2 | WEIGHT: 271.19 LBS | HEART RATE: 87 BPM | HEIGHT: 66 IN | DIASTOLIC BLOOD PRESSURE: 80 MMHG | SYSTOLIC BLOOD PRESSURE: 126 MMHG | OXYGEN SATURATION: 96 %

## 2018-08-17 DIAGNOSIS — J96.11 CHRONIC RESPIRATORY FAILURE WITH HYPOXIA: ICD-10-CM

## 2018-08-17 DIAGNOSIS — R60.9 EDEMA, UNSPECIFIED TYPE: ICD-10-CM

## 2018-08-17 DIAGNOSIS — J44.9 CHRONIC OBSTRUCTIVE PULMONARY DISEASE, UNSPECIFIED COPD TYPE: Primary | ICD-10-CM

## 2018-08-17 DIAGNOSIS — R60.1 ANASARCA: ICD-10-CM

## 2018-08-17 DIAGNOSIS — I50.30 HEART FAILURE WITH PRESERVED EJECTION FRACTION: ICD-10-CM

## 2018-08-17 DIAGNOSIS — E87.1 HYPONATREMIA: Primary | ICD-10-CM

## 2018-08-17 PROCEDURE — 99999 PR PBB SHADOW E&M-EST. PATIENT-LVL IV: CPT | Mod: PBBFAC,GC,,

## 2018-08-17 PROCEDURE — 99214 OFFICE O/P EST MOD 30 MIN: CPT | Mod: GC,S$GLB,,

## 2018-08-17 PROCEDURE — 3074F SYST BP LT 130 MM HG: CPT | Mod: CPTII,GC,S$GLB,

## 2018-08-17 PROCEDURE — 3079F DIAST BP 80-89 MM HG: CPT | Mod: CPTII,GC,S$GLB,

## 2018-08-17 RX ORDER — TORSEMIDE 20 MG/1
20 TABLET ORAL DAILY
Qty: 90 TABLET | Refills: 0 | Status: SHIPPED | OUTPATIENT
Start: 2018-08-17 | End: 2018-09-17

## 2018-08-17 NOTE — TELEPHONE ENCOUNTER
Message sent to pt via my chart with lab results and updates to plan.     rec inc torsemide to 20mg daily and repeat bmp in 2 weeks - please schedule

## 2018-08-20 ENCOUNTER — PATIENT OUTREACH (OUTPATIENT)
Dept: OTHER | Facility: OTHER | Age: 68
End: 2018-08-20

## 2018-08-20 PROBLEM — I50.30 HEART FAILURE WITH PRESERVED EJECTION FRACTION: Status: ACTIVE | Noted: 2018-08-20

## 2018-08-20 PROBLEM — R60.1 ANASARCA: Status: ACTIVE | Noted: 2018-08-20

## 2018-08-20 NOTE — PROGRESS NOTES
"[facility]  Follow-up Consultation Note    [facility]   [address]   [place] [address]    Chief Complaint/Reason for Visit:  Shortness of breath and swelling      Brief Clinical Summary:  Ms. Varma is a 68 yo woman who has a previous diagnosis of smoking related COPD and ongoing tobacco abuse. She was recently admitted to hospital at [facility] for acute on chronic respiratory failure and diagnosed with a COPD exacerbation and "acute diastolic heart failure". She was treated empirically with steroids and bronchodilators as well as diuretics for her diffuse anasarca. She was given  Torsemide at the time of discharge, however she reports that despite a nephrologist recently increasing the dose, she has noticed no change in her UOP and does not notice any effect of these meds on her urine volume.     During her hospital stay for acute respiratory failure she underwent PFT testing which demonstrated severe obstruction with an FEV1 of 39% of predicted. Our last PFT from 2016 demonstrated moderate obstruction 62% of predicted. She has continued to smoke during that time.     She also underwent 2D echo, LHC and RHC during that hospitalization, found to have elevated PA pressures, elevated wedge pressures, but normal LVEDP. The coronaries are normal.     Interval History:  Since discharge from hospital she has noticed worsening edema in the legs arms face and abdomen. She is wearing home O2 now and remains dyspneic on exertion. She notes no increased urine output when she takes her torsemide despite the recent increase in her dose. + orthopnea. Minimal cough. No sputum production. Still smoking.      has a past medical history of Allergy, Anemia due to gastrointestinal blood loss, Anxiety, Arthritis, CKD (chronic kidney disease) stage 3, GFR 30-59 ml/min, Gastric ulcer, H/O knee surgery, psychiatric care, Hyperlipidemia, Hypertension, Hyponatremia, Psychiatric problem, Therapy, " "and Tobacco abuse.    Past medical, surgical, social and family histories have been updated in the EMR    Allergies: Reviewed    Current medications have been reviewed    Current pulmonary regimen :     On examination:  /80   Pulse 87   Ht 5' 6" (1.676 m)   Wt 123 kg (271 lb 2.7 oz)   SpO2 96%   BMI 43.77 kg/m²   Physical Exam   Constitutional: She is oriented to person, place, and time. She appears well-developed and well-nourished. No distress.   HENT:   Head: Normocephalic and atraumatic.   Mouth/Throat: Oropharynx is clear and moist.   Eyes: Conjunctivae are normal.   Neck: Neck supple.   Cardiovascular: Normal rate, regular rhythm and normal heart sounds.   Pulmonary/Chest: Effort normal. No respiratory distress. She has no wheezes. She has rales.   Abdominal: Soft.   ++ abdominal wall edema   Musculoskeletal: She exhibits edema.   Massive pitting edema of legs arms abdominal wall face and under her eyes. It is predominantly on the left side. She sleeps on her left.    Neurological: She is alert and oriented to person, place, and time.   Skin: Capillary refill takes less than 2 seconds.   Psychiatric: She has a normal mood and affect. Her behavior is normal.       Recent laboratory and radiographic results have been reviewed  Notable results include:   Most recent PFT: 7/2018 - severe obstruction with signficant bronchodilator response , 39% FEV1     Most recent echocardiogram:     CONCLUSIONS     1 - Severe left atrial enlargement.     2 - Eccentric hypertrophy.     3 - Wall motion abnormalities.     4 - Impaired LV relaxation, elevated LAP (grade 2 diastolic dysfunction).     5 - Normal right ventricular systolic function .     Most recent right heart catheterization:     D. Hemodynamic Results     7/20/18    LVEDP (Pre): 12 mmHg  LVEDP (Post): 12 mmHg  Ejection Fraction: 68%    AIR REST:  PW: 29/35 (25)  PA: 49/24 (37)  CO_THERM: 8.02  CO_THERM: 8.21  CO_THERM: 8.01  RV: 59/8  RVEDP: 16     RA: " 19/14 (13)  AO: 180/71 (119)    AIR REST:  LV: 164/12  LVEDP: 26     Previous biopsy results: n/a    I have personally reviewed and interpreted the following studies:  CTA July 13th 2018 - cardiomegaly, diffuse multifocal ground glass, interlobular septal thickening, small bilateral effusions. All reflective of decompensated CHF    Assessment and Plan:    Nalini Varma is a 67 y.o. female who was seen today for evaluation of:     .  1. Chronic obstructive pulmonary disease, unspecified COPD type    2. Chronic respiratory failure with hypoxia    3. Heart failure with preserved ejection fraction      - No signs that her present dyspnea is being caused by an exacerbation of COPD  - Certainly needs follow up for her COPD, smoking and lung nodule, however not her biggest problem today  - Advised to continue current multi-inhaler regimen as before  - She is dramatically volume overloaded on our exam  - We offered her either a visit to the ED for evaluation or we would place an urgent consult to heart failure clinic to help with her diuresis, she preferred the later  - No evidence on review of labs that this is a protein wasting nephropathy or related to liver dysfunction  - We discussed smoking cessation again today and strongly encouraged her to quit.    Return to clinic: 3 months  Plan was discussed with staff pulmonologist Dr. Meyer    Thank you for the opportunity to evaluate Mrs. Varma.   Please feel free to call or message with any further concerns or questions.    Vargas Parker MD  [facility] Pulmonary and Critical Care Fellow

## 2018-08-20 NOTE — LETTER
Radha Sol PharmD  [address] [facility] [place] [address]     Dear Nalini Varma,    Welcome to the [facility]!           My name is Radha Sol PharmD and I am your dedicated Digital Medicine clinician.  As an expert in medication management, I will help ensure that the medications you are taking continue to provide you with the intended benefits.        I am Татьяна Joyce and I will be your health  for the duration of the program.  My  job is to help you identify lifestyle changes to improve your blood pressure control.  We will talk about nutrition, exercise, and other ways that you may be able to adjust your current habits to better your health. Together, we will work to improve your overall health and encourage you to meet your goals for a healthier lifestyle.    What we expect from YOU:    You will need to take blood pressure readings multiple times a week and no less than one reading per week.   It is important that you take your measurements at different times during the day, when possible.     What you should expect from your Digital Medicine Care Team:   We will provide you with education about high blood pressure, including lifestyle changes that could help you to control your blood pressure.   We will review your weekly readings and provide you with monthly blood pressure progress reports after you have been in the program for more than 30 days.   We will send monthly progress reports on your blood pressure control to your physician so they can follow along with your progress as well.    You will be able to reach me by phone at  or through your [facility] account by clicking my name under Care Team on the right side of the home screen.    I look forward to working with you to achieve your blood pressure goals!  Sincerely,    Radha Sol PharmD  Your personal clinician    Please visit [email or web address] to learn  more about high blood pressure and what you can do lower your blood pressure.                                                                                           Nalini Varma  [id number] [facility] [facility] [id number]

## 2018-08-20 NOTE — PROGRESS NOTES
Last 5 Patient Entered Readings                                      Current 30 Day Average: 155/84     Recent Readings 8/19/2018 8/18/2018 8/17/2018 8/16/2018    SBP (mmHg) 160 156 151 151    DBP (mmHg) 89 85 83 77    Pulse 85 79 75 90        Digital Medicine: Health  Introduction    Introduced Ms. Nalini Varma to Digital Medicine. Discussed health  role and recommended lifestyle modifications.    Lifestyle Assessment:  Current Dietary Habits(i.e. low sodium, food labels, dining out):    Exercise:   Denies being physically active. Walks through her apartment, and tries to do what she can. Is having problems with lower extremity mobility. Trying to do leg lifts and exercising arms and ankles in chair, but cannot lift her left leg. Has trouble getting into the car.     Alcohol/Tobacco:     Medication Adherence: has been compliant with the medicaiton regimen     Other goals: States that she is already working on no salt diet. Trying to get rid of her edema. Struggles with PA    States that left arm is swollen, and asks if this could cause elevated readings. Patient notes at recent pulmonology visit that they took BP on right arm, and BP was much improved. Will switch arms. States that she is not fully relaxed because she struggles to place the cuff on her arm. Encouraged patient to take time to relax once cuff is adjusted. Patient has been taking readings in arm chair, and states in spite of my recommendation to move to a kitchen table, that her arm is at heart level, and her feet are on the floor with back supported. Suggested that patient try taking readings in arm chair and at table to see if there is any difference.     Reviewed AHA/AACE recommendations:  Limit sodium intake to <2000mg/day  Recommended CHO intake, 45-65% of daily caloric intake  Perform 150 minutes of physical activity per week    Reviewed the importance of self-monitoring, medication adherence, and that the health  can be used  as a resource for lifestyle modifications to help reduce or maintain a healthy lifestyle.  Reviewed that the Digital Medicine team is not available for emergencies and instructed the patient to call 911 or [facility] ([phone] or [phone]) if one arises.

## 2018-08-22 ENCOUNTER — CLINICAL SUPPORT (OUTPATIENT)
Dept: SMOKING CESSATION | Facility: CLINIC | Age: 68
End: 2018-08-22
Payer: COMMERCIAL

## 2018-08-22 DIAGNOSIS — F17.200 NICOTINE DEPENDENCE: Primary | ICD-10-CM

## 2018-08-22 PROCEDURE — 99407 BEHAV CHNG SMOKING > 10 MIN: CPT | Mod: S$GLB,,, | Performed by: INTERNAL MEDICINE

## 2018-08-22 NOTE — PROGRESS NOTES
Spoke with patient today in regard to smoking cessation progress for 1 year follow up, she states not tobacco free. Patient states transportation is an issue for her making appointments, she also states being on oxygen and needing to quit smoking. Patient states not being able to schedule an appointment or return to the program at this time. Informed patient of benefit period and contact information if any further help or support is needed. Will resolve episode and complete smart form for Quit attempt #1.

## 2018-08-29 ENCOUNTER — OUTPATIENT CASE MANAGEMENT (OUTPATIENT)
Dept: ADMINISTRATIVE | Facility: OTHER | Age: 68
End: 2018-08-29

## 2018-08-29 NOTE — PROGRESS NOTES
Please note the following patient's information has been forwarded to [facility] ([facility]) for case mgmt and/or  by Outpatient Case Management.    Please see the media section of patient's chart for additional details.    Please contact Ext. [phone] with any questions.    Thank you,    Kiara Emery, [facility] [place].  [phone]

## 2018-08-31 ENCOUNTER — PATIENT OUTREACH (OUTPATIENT)
Dept: OTHER | Facility: OTHER | Age: 68
End: 2018-08-31

## 2018-08-31 NOTE — PROGRESS NOTES
"Last 5 Patient Entered Readings                                      Current 30 Day Average: 156/83     Recent Readings 8/28/2018 8/27/2018 8/23/2018 8/22/2018 8/21/2018    SBP (mmHg) 159 164 138 158 169    DBP (mmHg) 83 82 85 78 89    Pulse 76 79 74 78 82          Called Ms. Gayle to welcome her to the Hypertension digital medicine program. Average BP is 156/83 mmHg. Goal <130/<80 mmHg. For BP management, patient currently takes amlodipine 10 mg QD, atenolol 50 mg QD, and torsemide 20 mg QD. Patient has tried ACE-I and diuretics, which were stopped due to hyperkalemia and hyponatremia. Patient states that is trying to quit smoking on her own. She has tried counseling and chantix, but she fees she is doing best by slowing weaning herself off of cigarettes. She reports some days not smoking at all or only 1-2 cigarettes, and other days she may smoke a pack. On her onboarding questionnaire, she indicated snoring. She says she got a sleep study done in the past and "hated wearing the mask." Patient reports testing her BP in her left arm and notes that her left arm is extremely swollen. She stated that in the 's office, the nurse took her BP in her right arm, and her pressure was much lower.     1) Continue current BP medications for now  2) Discussed proper BP monitoring technique and BP goal.  3) Will defer lifestyle counseling to digital medicine health .   4) Will follow up with patient in 1 weeks to assess BP. Will consider switching amlodipine 10 mg to nifedipine SR 60 mg and titrate as needed.   5) Per BP Tx guidelines, I advised patient to take BP readings in the left arm since readings are higher in that arm unless cardiologist says otherwise.   5) Patient has an appoint with Dr. Ortez on 9/17/18. Routed Dr. Ortez my progress note to make him aware of co-mordities, swelling of the arm, and BP.     Radha Sol, Pharm.D.   Digital Medicine Clinical Pharmacist  [phone]      "

## 2018-09-03 ENCOUNTER — PATIENT MESSAGE (OUTPATIENT)
Dept: INTERNAL MEDICINE | Facility: CLINIC | Age: 68
End: 2018-09-03

## 2018-09-07 ENCOUNTER — PATIENT MESSAGE (OUTPATIENT)
Dept: INTERNAL MEDICINE | Facility: CLINIC | Age: 68
End: 2018-09-07

## 2018-09-11 ENCOUNTER — PATIENT OUTREACH (OUTPATIENT)
Dept: OTHER | Facility: OTHER | Age: 68
End: 2018-09-11

## 2018-09-11 ENCOUNTER — LAB VISIT (OUTPATIENT)
Dept: LAB | Facility: OTHER | Age: 68
End: 2018-09-11
Attending: INTERNAL MEDICINE
Payer: MEDICARE

## 2018-09-11 DIAGNOSIS — E87.1 HYPONATREMIA: ICD-10-CM

## 2018-09-11 LAB
ANION GAP SERPL CALC-SCNC: 15 MMOL/L
BUN SERPL-MCNC: 10 MG/DL
CALCIUM SERPL-MCNC: 9 MG/DL
CHLORIDE SERPL-SCNC: 95 MMOL/L
CO2 SERPL-SCNC: 27 MMOL/L
CREAT SERPL-MCNC: 1 MG/DL
EST. GFR  (AFRICAN AMERICAN): >60 ML/MIN/1.73 M^2
EST. GFR  (NON AFRICAN AMERICAN): 58 ML/MIN/1.73 M^2
GLUCOSE SERPL-MCNC: 101 MG/DL
POTASSIUM SERPL-SCNC: 4.2 MMOL/L
SODIUM SERPL-SCNC: 137 MMOL/L

## 2018-09-11 PROCEDURE — 80048 BASIC METABOLIC PNL TOTAL CA: CPT

## 2018-09-11 PROCEDURE — 36415 COLL VENOUS BLD VENIPUNCTURE: CPT

## 2018-09-11 NOTE — PROGRESS NOTES
Last 5 Patient Entered Readings                                      Current 30 Day Average: 157/83     Recent Readings 9/10/2018 9/9/2018 8/31/2018 8/31/2018 8/28/2018    SBP (mmHg) 166 142 166 171 159    DBP (mmHg) 86 79 82 84 83    Pulse 90 75 82 82 76          Digital Medicine: Health  Follow Up    Left voicemail to follow up with Ms. Nalini Varma.  Current BP average 157/83 mmHg is not at goal, 130/80    Follow up about setting PA goals, follow up from pharmD discussion about smoking cessation

## 2018-09-12 ENCOUNTER — TELEPHONE (OUTPATIENT)
Dept: INTERNAL MEDICINE | Facility: CLINIC | Age: 68
End: 2018-09-12

## 2018-09-12 NOTE — TELEPHONE ENCOUNTER
Spoke w/ pt and confirmed RTA forms has been completed   Pt asked to have forms mailed to her   Forms has been mailed to address on file

## 2018-09-13 ENCOUNTER — PATIENT MESSAGE (OUTPATIENT)
Dept: INTERNAL MEDICINE | Facility: CLINIC | Age: 68
End: 2018-09-13

## 2018-09-13 DIAGNOSIS — G89.4 CHRONIC PAIN DISORDER: Primary | ICD-10-CM

## 2018-09-13 RX ORDER — HYDROCODONE BITARTRATE AND ACETAMINOPHEN 10; 325 MG/1; MG/1
1 TABLET ORAL EVERY 12 HOURS
Qty: 60 TABLET | Refills: 0 | Status: SHIPPED | OUTPATIENT
Start: 2018-09-13 | End: 2018-09-14 | Stop reason: SDUPTHER

## 2018-09-14 ENCOUNTER — OFFICE VISIT (OUTPATIENT)
Dept: NEPHROLOGY | Facility: CLINIC | Age: 68
End: 2018-09-14
Payer: MEDICARE

## 2018-09-14 VITALS
DIASTOLIC BLOOD PRESSURE: 80 MMHG | WEIGHT: 258 LBS | HEIGHT: 66 IN | HEART RATE: 86 BPM | BODY MASS INDEX: 41.46 KG/M2 | OXYGEN SATURATION: 95 % | SYSTOLIC BLOOD PRESSURE: 158 MMHG

## 2018-09-14 DIAGNOSIS — E87.1 HYPONATREMIA: Primary | ICD-10-CM

## 2018-09-14 DIAGNOSIS — G89.4 CHRONIC PAIN DISORDER: ICD-10-CM

## 2018-09-14 PROCEDURE — 99499 UNLISTED E&M SERVICE: CPT | Mod: S$GLB,,, | Performed by: INTERNAL MEDICINE

## 2018-09-14 PROCEDURE — 99214 OFFICE O/P EST MOD 30 MIN: CPT | Mod: PBBFAC | Performed by: INTERNAL MEDICINE

## 2018-09-14 PROCEDURE — 3077F SYST BP >= 140 MM HG: CPT | Mod: CPTII,,, | Performed by: INTERNAL MEDICINE

## 2018-09-14 PROCEDURE — 99213 OFFICE O/P EST LOW 20 MIN: CPT | Mod: S$PBB,,, | Performed by: INTERNAL MEDICINE

## 2018-09-14 PROCEDURE — 99999 PR PBB SHADOW E&M-EST. PATIENT-LVL IV: CPT | Mod: PBBFAC,,, | Performed by: INTERNAL MEDICINE

## 2018-09-14 PROCEDURE — 3079F DIAST BP 80-89 MM HG: CPT | Mod: CPTII,,, | Performed by: INTERNAL MEDICINE

## 2018-09-14 PROCEDURE — 1101F PT FALLS ASSESS-DOCD LE1/YR: CPT | Mod: CPTII,,, | Performed by: INTERNAL MEDICINE

## 2018-09-14 RX ORDER — HYDROCODONE BITARTRATE AND ACETAMINOPHEN 10; 325 MG/1; MG/1
1 TABLET ORAL EVERY 12 HOURS
Qty: 60 TABLET | Refills: 0 | Status: SHIPPED | OUTPATIENT
Start: 2018-09-14 | End: 2018-10-15 | Stop reason: SDUPTHER

## 2018-09-14 NOTE — TELEPHONE ENCOUNTER
Spoke w/ pt and confirmed RX has been sent  Pt verbally understands and has no further questions

## 2018-09-14 NOTE — PROGRESS NOTES
Patient, Nalini Varma (MRN #[id number]), presented with a recorded BMI of 41.64 kg/m^2 consistent with the definition of morbid obesity (ICD-10 E66.01). The patient's morbid obesity was monitored, evaluated, addressed and/or treated. This addendum to the medical record is made on 09/14/2018.

## 2018-09-14 NOTE — TELEPHONE ENCOUNTER
----- Message from Lea Rivas sent at 9/14/2018  9:39 AM CDT -----  Contact: ELIEZER RODRIGUEZ [[id number]]    Name of Who is Calling: ELIEZER RODRIGUEZ [[id number]]    What is the request in detail: patient would like to have Norco medication sent to [facility]. Please call     Can the clinic reply by [facility]: no      What Number to Call Back [facility]:[phone]

## 2018-09-14 NOTE — PROGRESS NOTES
"NEPHROLOGY PROGRESS NOTE    Subjective:       Patient ID: Nalini Varma is a 67 y.o. White female who presents for follow up evaluation of hyponatremia. Previously seen by Dr. Gee. This is the first time that I am seeing this patient.     From Dr. Gee:  "The patient has a history HTN x 2-3 yrs. She was recently admitted to [facility] with dizziness and found to have acute on chronic to hyponatremia. Her hyponatremia is evident since 12/16  But also had borderline low sodium since 2/16 (135 meq/l). In December of 2016 she was on HCTZ and Zoloft but she is not taking it anymore. Reports adhering to fluid restriction around 1 liter at this time. Last serum Na down was 130 at discharge from [facility]. Unfortunately the patient is homeless and lives in a shelter at this time (20 month).  The patient does not frequently use NSAIDS or herbal supplements. Livelong smoker (40 yrs)."    HPI   Arrives for follow up, reports MEDELLIN, uses a walker. Her serum K was elevated and it corrected after stopping lisinopril and taking SPS. Trying to eat less and drinking less water. Reports edema. She was hospitalized due to pulmonary congestion 5 mo ago.     Review of Systems   Constitutional: Negative for activity change, appetite change, chills, fatigue, fever and unexpected weight change.   HENT: Negative.  Negative for nosebleeds.    Eyes: Negative.    Respiratory: Positive for shortness of breath (stable with walking). Negative for cough and chest tightness.    Cardiovascular: Negative.  Negative for chest pain and leg swelling.   Gastrointestinal: Negative for abdominal pain, anal bleeding, diarrhea, nausea and vomiting.   Genitourinary: Negative for decreased urine volume, difficulty urinating, dysuria, flank pain, frequency, hematuria, pelvic pain, urgency and vaginal bleeding.   Musculoskeletal: Negative.  Negative for joint swelling and myalgias.   Skin: Negative.  Negative for rash.   Neurological: Negative.  "   Psychiatric/Behavioral: Negative.    All other systems reviewed and are negative.      Objective:   /80 mmHg  Physical Exam   Constitutional: She is oriented to person, place, and time. She appears well-developed and well-nourished. No distress.   HENT:   Head: Normocephalic and atraumatic.   Right Ear: External ear normal.   Left Ear: External ear normal.   Nose: Nose normal.   Mouth/Throat: Oropharynx is clear and moist.   Eyes: Conjunctivae and EOM are normal. Pupils are equal, round, and reactive to light.   Neck: Normal range of motion. Neck supple. No thyromegaly present.   Cardiovascular: Normal rate, regular rhythm and intact distal pulses.   Murmur (systolic) heard.  Pulmonary/Chest: Effort normal and breath sounds normal. No respiratory distress. She has no wheezes. She has no rales. She exhibits no tenderness.   Abdominal: Soft. Normal appearance and bowel sounds are normal. She exhibits no distension. There is no tenderness. There is no rebound and no guarding.   Musculoskeletal: She exhibits edema (2+ bilateral). She exhibits no tenderness.   Neurological: She is alert and oriented to person, place, and time. She has normal reflexes.   Skin: Skin is warm, dry and intact. She is not diaphoretic.   Nails of large toes missing   Psychiatric: She has a normal mood and affect. Her behavior is normal. Judgment and thought content normal.   Nursing note and vitals reviewed.      Problem List (Nephrology)       1. Hyponatremia        Assessment / Plan:       Chronic Hyponatremia. Her labs in the past with the elevated urinary sodium and osmolality in the light of hypontremia point towards SIADH. Wellbutrin and HCTZ could have played a role in the past. However she has a clinical picture consistent with pulmonary hypertension which could lead to hypervolemic hyponatremia. Continue with fluid restriction 1 L/day and salt restriction.  Normonatremic now.      Hyperkalemia: improved off ACEI, no compelling  reason to restart ACEI. BP well controlled with atenolol. Continue low K diet.     Edema: secondary to pulmonary hypertension and HFpEF, no proteinuria. Could also have deep venous insufficiency and/or lymphedema from obesity. Will keep her on a loop diuretic.     RTC in 1 year with Dr. Gee

## 2018-09-17 ENCOUNTER — OFFICE VISIT (OUTPATIENT)
Dept: CARDIOLOGY | Facility: CLINIC | Age: 68
End: 2018-09-17
Payer: MEDICARE

## 2018-09-17 VITALS
HEIGHT: 66 IN | BODY MASS INDEX: 41.49 KG/M2 | HEART RATE: 85 BPM | DIASTOLIC BLOOD PRESSURE: 76 MMHG | SYSTOLIC BLOOD PRESSURE: 164 MMHG | WEIGHT: 258.19 LBS

## 2018-09-17 DIAGNOSIS — J44.9 CHRONIC OBSTRUCTIVE PULMONARY DISEASE, UNSPECIFIED COPD TYPE: ICD-10-CM

## 2018-09-17 DIAGNOSIS — F17.200 TOBACCO DEPENDENCE: ICD-10-CM

## 2018-09-17 DIAGNOSIS — E78.5 HYPERLIPIDEMIA, UNSPECIFIED HYPERLIPIDEMIA TYPE: ICD-10-CM

## 2018-09-17 DIAGNOSIS — I50.30 HEART FAILURE WITH PRESERVED EJECTION FRACTION: ICD-10-CM

## 2018-09-17 DIAGNOSIS — I50.33 ACUTE ON CHRONIC HEART FAILURE WITH NORMAL EJECTION FRACTION: Primary | ICD-10-CM

## 2018-09-17 DIAGNOSIS — J96.11 CHRONIC RESPIRATORY FAILURE WITH HYPOXIA: ICD-10-CM

## 2018-09-17 DIAGNOSIS — I10 HYPERTENSION, BENIGN: ICD-10-CM

## 2018-09-17 PROBLEM — R60.9 EDEMA: Status: ACTIVE | Noted: 2018-09-17

## 2018-09-17 PROCEDURE — 1101F PT FALLS ASSESS-DOCD LE1/YR: CPT | Mod: CPTII,,, | Performed by: INTERNAL MEDICINE

## 2018-09-17 PROCEDURE — 99999 PR PBB SHADOW E&M-EST. PATIENT-LVL III: CPT | Mod: PBBFAC,,, | Performed by: INTERNAL MEDICINE

## 2018-09-17 PROCEDURE — 3077F SYST BP >= 140 MM HG: CPT | Mod: CPTII,,, | Performed by: INTERNAL MEDICINE

## 2018-09-17 PROCEDURE — 3078F DIAST BP <80 MM HG: CPT | Mod: CPTII,,, | Performed by: INTERNAL MEDICINE

## 2018-09-17 PROCEDURE — 99213 OFFICE O/P EST LOW 20 MIN: CPT | Mod: PBBFAC | Performed by: INTERNAL MEDICINE

## 2018-09-17 PROCEDURE — 99214 OFFICE O/P EST MOD 30 MIN: CPT | Mod: S$PBB,,, | Performed by: INTERNAL MEDICINE

## 2018-09-17 RX ORDER — TORSEMIDE 20 MG/1
40 TABLET ORAL DAILY
Qty: 90 TABLET | Refills: 0 | Status: ON HOLD | OUTPATIENT
Start: 2018-09-17 | End: 2018-12-10

## 2018-09-17 NOTE — PROGRESS NOTES
Chart has been dictated using voice recognition software.  It is not been reviewed carefully for any transcriptional errors due to this technology.   Subjective:   Patient ID:  Nalini Varma is a 67 y.o. female who presents for follow-up of Acute diastolic congestive heart failure (hospital discharge 07/21/18)      HPI:  Patient with history of peptic ulcer disease with GI bleed (jan-2015) due to NSAID abuse, chronic hyponatremia, tobacco abuse, chronic pain syndrome, diastolic dysfunction hyperlipidemia,  hypertension, and COPD.    Patient was hospitalized from 57-09-Lbbb-2018 [facility] for heart failure and exacerbation of COPD.  Patient underwent coronary angiography and catheterization with the following results:    Left heart catheterization (20 July 2018):  Hemodynamic Results  LVEDP (Pre): 12 mmHg  LVEDP (Post): 12 mmHg  Ejection Fraction: 68%  AIR REST:  PW: 29/35 (25)  PA: 49/24 (37)  CO_THERM: 8.02  CO_THERM: 8.21  CO_THERM: 8.01  RV: 59/8  RVEDP: 16   RA: 19/14 (13)  AO: 180/71 (119)  AIR REST:  LV: 164/12  LVEDP: 26   Angiographic Results:   Patient has a right dominant coronary artery.      - Left Main Coronary Artery:             The LM is normal. There is ROME 3 flow.     - Left Anterior Descending Artery:             The LAD is normal. There is ROME 3 flow.     - Left Circumflex Artery:             The LCX is normal. There is ROME 3 flow.     - Right Coronary Artery:             The RCA is normal. There is ROME 3 flow.     - Common Femoral Artery:             The right CFA is normal.    Echocardiogram (14 July 2018):    1 - Severe left atrial enlargement.     2 - Eccentric hypertrophy.     3 - Wall motion abnormalities. (EF 60-65%)    4 - Impaired LV relaxation, elevated LAP (grade 2 diastolic dysfunction).     5 - Normal right ventricular systolic function.    Patient notes no change in dyspnea since hospital discharge.  Can only walk about  feet before she gets dyspnea.  Sleeps on 3  pillows.   No PND. Notes swelling except for ankles. Patient denies any palpitations, lightheadedness, or syncope. Will get chest discomfort laying down. On a mostly low salt diet.    Past Medical History:   Diagnosis Date    Allergy     Anemia due to gastrointestinal blood loss     LESLY from gastric ulcer due to chronic nsaid use    Anxiety     Arthritis     CKD (chronic kidney disease) stage 3, GFR 30-59 ml/min     followed by [facility] HA study - recieved labs from 2017 and 2/2018    Gastric ulcer     H/O knee surgery 2001    right    Hx of psychiatric care     Hyperlipidemia     Hypertension     Hyponatremia     Psychiatric problem     Therapy     Tobacco abuse        Outpatient Medications Prior to Visit   Medication Sig Dispense Refill    albuterol (VENTOLIN HFA) 90 mcg/actuation inhaler inhale 1-2 puffs as needed every 6 hours for wheezing and shortness of breath 18 g 11    alendronate (FOSAMAX) 70 MG tablet Take 1 tablet (70 mg total) by mouth every 7 days. on Wednesday 12 tablet 3    amLODIPine (NORVASC) 10 MG tablet TAKE 1 TABLET BY MOUTH ONCE DAILY 90 tablet 3    aspirin (ECOTRIN) 81 MG EC tablet Take 81 mg by mouth once daily.      atenolol (TENORMIN) 50 MG tablet Take 1 tablet (50 mg total) by mouth once daily. 90 tablet 3    atorvastatin (LIPITOR) 40 MG tablet TAKE 1 TABLET BY MOUTH EVERY DAILY 90 tablet 1    biotin 1 mg Cap Take by mouth.      calcium carbonate (OS-SANDRINE) 500 mg calcium (1,250 mg) chewable tablet Take 2 tablets by mouth daily as needed for Heartburn.      cyanocobalamin 1,000 mcg TbSR Take 1,000 mcg by mouth once daily. (Patient taking differently: Take 1,000 mcg by mouth daily as needed. ) 90 tablet 1    famotidine (PEPCID) 20 MG tablet TAKE 1 TABLET BY MOUTH TWO TIMES A  tablet 3    fluticasone (FLONASE) 50 mcg/actuation nasal spray 1 spray by Each Nare route 2 (two) times daily as needed for Rhinitis.      fluticasone-salmeterol (ADVAIR HFA) 115-21  mcg/actuation HFAA Inhale 2 puffs into the lungs every 12 (twelve) hours. 36 g 3    guaiFENesin (MUCINEX) 600 mg 12 hr tablet Take 1,200 mg by mouth 2 (two) times daily as needed for Congestion.      HYDROcodone-acetaminophen (NORCO)  mg per tablet Take 1 tablet by mouth every 12 hours as needed for pain 60 tablet 0    loratadine (CLARITIN) 10 mg tablet Take 10 mg by mouth once daily.      magnesium oxide 400 mg Cap Take 1 capsule by mouth once daily.      umeclidinium 62.5 mcg/actuation DsDv Inhale 1 puff into the lungs once daily. Controller 90 each 3    vitamin D 1000 units Tab Take 1,000 Units by mouth once daily.      walker (ULTRA-LIGHT ROLLATOR) Misc 1 each by Misc.(Non-Drug; Combo Route) route once daily. 1 each 0    torsemide (DEMADEX) 20 MG Tab Take 1 tablet (20 mg total) by mouth once daily. 90 tablet 0     No facility-administered medications prior to visit.        Review of Systems   Constitution: Positive for weight loss. Negative for weight gain.   HENT: Positive for nosebleeds (easily controlled).    Eyes: Negative for vision loss in left eye and vision loss in right eye.   Cardiovascular: Negative for claudication.        As above   Respiratory: Negative for hemoptysis, shortness of breath, snoring, sputum production and wheezing.    Endocrine: Negative for polydipsia and polyuria.   Hematologic/Lymphatic: Does not bruise/bleed easily.   Musculoskeletal: Negative for myalgias.   Gastrointestinal: Negative for change in bowel habit, hematemesis, hematochezia, melena, nausea and vomiting.   Genitourinary: Negative for hematuria.   Neurological: Positive for focal weakness (left side weakness starting post hospitalization.). Negative for numbness.     Objective:   Physical Exam   Constitutional: She is oriented to person, place, and time. She appears well-developed and well-nourished. Nasal cannula in place.   BP (!) 164/76 (BP Location: Left arm, Patient Position: Sitting, BP Method:  "X-Large (Automatic))   Pulse 85   Ht 5' 6" (1.676 m)   Wt 117.1 kg (258 lb 2.5 oz)   BMI 41.67 kg/m²  obese    Neck: Neck supple. No JVD present. Carotid bruit is not present. No thyromegaly present.   Cardiovascular: Normal rate, regular rhythm, S1 normal, S2 normal and intact distal pulses.  Occasional extrasystoles are present. Exam reveals S4. Exam reveals no friction rub.   Murmur heard.  High-pitched blowing holosystolic murmur is present with a grade of 2/6 at the apex.  Pulmonary/Chest: She has wheezes (diffuse). She has no rales.   Abdominal: Soft. Bowel sounds are normal. There is no hepatosplenomegaly. There is no tenderness.   Musculoskeletal: She exhibits edema (3-4+ tense edema extending into thighs bilaterally with 1 + presacral and abdominal edema.; 1+ edema in left forearm).   Neurological: She is alert and oriented to person, place, and time. She has normal strength.   Skin: No cyanosis. Nails show no clubbing.         Lab Results   Component Value Date     09/11/2018    K 4.2 09/11/2018    BUN 10 09/11/2018    CREATININE 1.0 09/11/2018     09/11/2018    HGBA1C 5.6 04/11/2017    CHOL 136 04/24/2018    HDL 74 04/24/2018    LDLCALC 49.2 (L) 04/24/2018    TRIG 64 04/24/2018    CHOLHDL 54.4 (H) 04/24/2018    HGB 10.5 (L) 07/15/2018    HCT 31.3 (L) 07/15/2018     07/15/2018    INR 1.0 10/25/2015       Assessment:     1. Acute on chronic heart failure with normal ejection fraction    2. Chronic obstructive pulmonary disease, unspecified COPD type    3. Hypertension, benign    4. Tobacco dependence    5. Hyperlipidemia, unspecified hyperlipidemia type    6. Heart failure with preserved ejection fraction    7. Chronic respiratory failure with hypoxia      The patient has evidence of significant volume overload.  Will increase torsemide to 40 mg qd and have the patient return in 4 days to reevaluate, including electrolytes.  Plan:     Nailni was seen today for acute diastolic " congestive heart failure.    Diagnoses and all orders for this visit:    Acute on chronic heart failure with normal ejection fraction  -     torsemide (DEMADEX) 20 MG Tab; Take 2 tablets (40 mg total) by mouth once daily.  -     Basic metabolic panel; Future; Expected date: 09/21/2018    Chronic obstructive pulmonary disease, unspecified COPD type    Hypertension, benign    Tobacco dependence    Hyperlipidemia, unspecified hyperlipidemia type    Heart failure with preserved ejection fraction    Chronic respiratory failure with hypoxia          Parvez Ortez MD  Consultative Cardiology

## 2018-09-17 NOTE — Clinical Note
Thank you for referring Nalini Varma for evaluation of congestive heart failure. Please see my note for details of this encounter. If you have any questions, please contact me.  Thank you again for the referral.

## 2018-09-18 ENCOUNTER — PATIENT OUTREACH (OUTPATIENT)
Dept: OTHER | Facility: OTHER | Age: 68
End: 2018-09-18

## 2018-09-18 NOTE — PROGRESS NOTES
Last 5 Patient Entered Readings                                      Current 30 Day Average: 155/85     Recent Readings 9/15/2018 9/15/2018 9/12/2018 9/12/2018 9/10/2018    SBP (mmHg) 152 152 132 131 166    DBP (mmHg) 84 96 93 111 86    Pulse 77 80 79 79 90          Called patient for BP follow up. She currently takes amlodipine 10 mg QD, atenolol 50 mg QD, and torsemide 40 mg QD. BP management is complicated by co-morbid conditions and intolerance to ACE-I and diuretics. She had a clinic visit with saulo yesterday 9/17/18. Patient says that Dr. Ortez wanted her to go to the hospital to manage edema. Patient states that she did not want to go to the hospital. She will follow up with Dr. Ortez on Friday or the following Monday. Patient has transportation issues.     1) Continue current BP medications. Follow directions from cardiology.   2) After my last conversation with Ms. Varma, I contacted cards to make physician aware of co-morbidities complicating BP management. More pertinent issues when patient had cards appt.   3) Messaged PCP about BP management.   4) Will have health  send  Resources on transportation.      Radha Sol, Pharm.D.  [facility] Digital Medicine Clinical Pharmacist  [phone]

## 2018-09-18 NOTE — PROGRESS NOTES
"Last 5 Patient Entered Readings                                      Current 30 Day Average: 155/85     Recent Readings 9/15/2018 9/15/2018 9/12/2018 9/12/2018 9/10/2018    SBP (mmHg) 152 152 132 131 166    DBP (mmHg) 84 96 93 111 86    Pulse 77 80 79 79 90        Digital Medicine: Health  Follow Up    Lifestyle Modifications:    1.Dietary Modifications (Sodium intake <2,000mg/day, food labels, dining out):    Still working on no salt. Ate just breakfast yesterday, did not get home until after 6 and was too tired to cook. Eats eggs and veronica or eggs and toast in the mornings, coffee and oj or cranberry juice, does not normally eat lunch. Dinners eats chicken or other meats, starch such as rice, noodles or potatoes, some kind of salad or vegetable. Has switched over to brown rice. Does not like whole wheat pasta or quinoa. Patient recently bought barley.     2.Physical Activity:     3.Medication Therapy: Patient has been compliant with the medication regimen.    4.Patient has the following medication side effects/concerns:   (Frequency/Alleviating factors/Precipitating factors, etc.)     Follow up with Ms. Nalini Varma completed. No further questions or concerns. Will continue to follow up to achieve health goals.     Discussed smoking cessation- report that it depends on the day, some days does well, other days does not. Reports that if she is upset or nervous she will want to smoke more. Frequently wakes up in the middle of the night and is in the habit of smoking then when she can't get back to sleep. Smokes while looking at her phone at night. Recommended patient try a cessation timoteo called MyQuit . Patient agrees to download and try it out. Will follow up     Patient reports that she needs to find something to do with her hands to keep her busy. Used to nghia, but patient notes that yarn is too expensive, and that she would save money to just "buy the finished product". Will continue to brainstorm " ways to occupy her hands to avoid smoking.

## 2018-09-19 ENCOUNTER — PATIENT MESSAGE (OUTPATIENT)
Dept: CARDIOLOGY | Facility: CLINIC | Age: 68
End: 2018-09-19

## 2018-09-26 ENCOUNTER — OFFICE VISIT (OUTPATIENT)
Dept: CARDIOLOGY | Facility: CLINIC | Age: 68
End: 2018-09-26
Payer: MEDICARE

## 2018-09-26 ENCOUNTER — LAB VISIT (OUTPATIENT)
Dept: LAB | Facility: HOSPITAL | Age: 68
End: 2018-09-26
Attending: INTERNAL MEDICINE
Payer: MEDICARE

## 2018-09-26 VITALS
SYSTOLIC BLOOD PRESSURE: 160 MMHG | WEIGHT: 250.69 LBS | HEART RATE: 89 BPM | BODY MASS INDEX: 40.29 KG/M2 | HEIGHT: 66 IN | DIASTOLIC BLOOD PRESSURE: 77 MMHG

## 2018-09-26 DIAGNOSIS — E78.5 HYPERLIPIDEMIA, UNSPECIFIED HYPERLIPIDEMIA TYPE: ICD-10-CM

## 2018-09-26 DIAGNOSIS — I50.33 ACUTE ON CHRONIC HEART FAILURE WITH NORMAL EJECTION FRACTION: ICD-10-CM

## 2018-09-26 DIAGNOSIS — R60.9 EDEMA, UNSPECIFIED TYPE: ICD-10-CM

## 2018-09-26 DIAGNOSIS — F17.200 TOBACCO DEPENDENCE: ICD-10-CM

## 2018-09-26 DIAGNOSIS — I50.30 HEART FAILURE WITH PRESERVED EJECTION FRACTION: Primary | ICD-10-CM

## 2018-09-26 DIAGNOSIS — E87.6 HYPOKALEMIA: ICD-10-CM

## 2018-09-26 DIAGNOSIS — I10 HYPERTENSION, BENIGN: ICD-10-CM

## 2018-09-26 DIAGNOSIS — J44.9 CHRONIC OBSTRUCTIVE PULMONARY DISEASE, UNSPECIFIED COPD TYPE: ICD-10-CM

## 2018-09-26 PROBLEM — E87.5 HYPERKALEMIA: Status: RESOLVED | Noted: 2018-05-23 | Resolved: 2018-09-26

## 2018-09-26 LAB
ANION GAP SERPL CALC-SCNC: 16 MMOL/L
BUN SERPL-MCNC: 12 MG/DL
CALCIUM SERPL-MCNC: 8.4 MG/DL
CHLORIDE SERPL-SCNC: 95 MMOL/L
CO2 SERPL-SCNC: 24 MMOL/L
CREAT SERPL-MCNC: 0.9 MG/DL
EST. GFR  (AFRICAN AMERICAN): >60 ML/MIN/1.73 M^2
EST. GFR  (NON AFRICAN AMERICAN): >60 ML/MIN/1.73 M^2
GLUCOSE SERPL-MCNC: 105 MG/DL
POTASSIUM SERPL-SCNC: 3.4 MMOL/L
SODIUM SERPL-SCNC: 135 MMOL/L

## 2018-09-26 PROCEDURE — 99213 OFFICE O/P EST LOW 20 MIN: CPT | Mod: PBBFAC | Performed by: INTERNAL MEDICINE

## 2018-09-26 PROCEDURE — 3078F DIAST BP <80 MM HG: CPT | Mod: CPTII,,, | Performed by: INTERNAL MEDICINE

## 2018-09-26 PROCEDURE — 3077F SYST BP >= 140 MM HG: CPT | Mod: CPTII,,, | Performed by: INTERNAL MEDICINE

## 2018-09-26 PROCEDURE — 36415 COLL VENOUS BLD VENIPUNCTURE: CPT

## 2018-09-26 PROCEDURE — 1101F PT FALLS ASSESS-DOCD LE1/YR: CPT | Mod: CPTII,,, | Performed by: INTERNAL MEDICINE

## 2018-09-26 PROCEDURE — 99999 PR PBB SHADOW E&M-EST. PATIENT-LVL III: CPT | Mod: PBBFAC,,, | Performed by: INTERNAL MEDICINE

## 2018-09-26 PROCEDURE — 80048 BASIC METABOLIC PNL TOTAL CA: CPT

## 2018-09-26 PROCEDURE — 99213 OFFICE O/P EST LOW 20 MIN: CPT | Mod: S$PBB,,, | Performed by: INTERNAL MEDICINE

## 2018-09-26 NOTE — PROGRESS NOTES
Chart has been dictated using voice recognition software.  It is not been reviewed carefully for any transcriptional errors due to this technology.   Subjective:   Patient ID:  Nalini Varma is a 67 y.o. female who presents for follow-up of Congestive Heart Failure (1 week f/u )      HPI: Patient with Acute on chronic heart failure with normal ejection fraction, Chronic obstructive pulmonary disease, Hypertension, Tobacco dependence, and Hyperlipidemia.     When last seen 1 month ago, patient had significant volume overload.  No change in dyspnea.  Has no edema in upper extremity.  Still c/o abdominal and lower extremity edema with pain in legs when walking.  Tends to have more urine output at night. Patient denies any chest discomfort on exertion or at rest. Patient denies any palpitations, lightheadedness, or syncope. No PND.      Past Medical History:   Diagnosis Date    Allergy     Anemia due to gastrointestinal blood loss     LESLY from gastric ulcer due to chronic nsaid use    Anxiety     Arthritis     CKD (chronic kidney disease) stage 3, GFR 30-59 ml/min     followed by [facility] study - recieved labs from 2017 and 2/2018    Gastric ulcer     H/O knee surgery 2001    right    Heart failure with preserved ejection fraction 8/20/2018    Hx of psychiatric care     Hyperlipidemia     Hypertension     Hyponatremia     Psychiatric problem     Therapy     Tobacco abuse        Outpatient Medications Prior to Visit   Medication Sig Dispense Refill    albuterol (VENTOLIN HFA) 90 mcg/actuation inhaler inhale 1-2 puffs as needed every 6 hours for wheezing and shortness of breath 18 g 11    alendronate (FOSAMAX) 70 MG tablet Take 1 tablet (70 mg total) by mouth every 7 days. on Wednesday 12 tablet 3    amLODIPine (NORVASC) 10 MG tablet TAKE 1 TABLET BY MOUTH ONCE DAILY 90 tablet 3    aspirin (ECOTRIN) 81 MG EC tablet Take 81 mg by mouth once daily.      atenolol (TENORMIN) 50 MG tablet Take 1 tablet  (50 mg total) by mouth once daily. 90 tablet 3    atorvastatin (LIPITOR) 40 MG tablet TAKE 1 TABLET BY MOUTH EVERY DAILY 90 tablet 1    biotin 1 mg Cap Take by mouth.      calcium carbonate (OS-SANDRINE) 500 mg calcium (1,250 mg) chewable tablet Take 2 tablets by mouth daily as needed for Heartburn.      cyanocobalamin 1,000 mcg TbSR Take 1,000 mcg by mouth once daily. (Patient taking differently: Take 1,000 mcg by mouth daily as needed. ) 90 tablet 1    famotidine (PEPCID) 20 MG tablet TAKE 1 TABLET BY MOUTH TWO TIMES A  tablet 3    fluticasone (FLONASE) 50 mcg/actuation nasal spray 1 spray by Each Nare route 2 (two) times daily as needed for Rhinitis.      fluticasone-salmeterol (ADVAIR HFA) 115-21 mcg/actuation HFAA Inhale 2 puffs into the lungs every 12 (twelve) hours. 36 g 3    guaiFENesin (MUCINEX) 600 mg 12 hr tablet Take 1,200 mg by mouth 2 (two) times daily as needed for Congestion.      HYDROcodone-acetaminophen (NORCO)  mg per tablet Take 1 tablet by mouth every 12 hours as needed for pain 60 tablet 0    loratadine (CLARITIN) 10 mg tablet Take 10 mg by mouth once daily.      magnesium oxide 400 mg Cap Take 1 capsule by mouth once daily.      torsemide (DEMADEX) 20 MG Tab Take 2 tablets (40 mg total) by mouth once daily. 90 tablet 0    umeclidinium 62.5 mcg/actuation DsDv Inhale 1 puff into the lungs once daily. Controller 90 each 3    vitamin D 1000 units Tab Take 1,000 Units by mouth once daily.      walker (ULTRA-LIGHT ROLLATOR) Misc 1 each by Misc.(Non-Drug; Combo Route) route once daily. 1 each 0     No facility-administered medications prior to visit.        ROS - No change from prior visit in neurologic, respiratory, endocrine, GI, hematologic, or constitutional complaints   Objective:   Physical Exam   Constitutional: She is oriented to person, place, and time. She appears well-developed and well-nourished.   BP (!) 160/77 (BP Location: Left arm, Patient Position: Sitting, BP  "Method: Large (Automatic))   Pulse 89   Ht 5' 6" (1.676 m)   Wt 113.7 kg (250 lb 10.6 oz)   BMI 40.46 kg/m²      Neck: Neck supple. No JVD present. Carotid bruit is not present.   Cardiovascular: Normal rate, regular rhythm, normal heart sounds and intact distal pulses. Exam reveals no gallop and no friction rub.   No murmur heard.  Pulmonary/Chest: Effort normal. She has wheezes (diffuse end expiratory). She has no rales.   Abdominal: Soft. Bowel sounds are normal. There is no tenderness.   Musculoskeletal: She exhibits edema (3+tense edema to knees; no thigh edema; trace presacral; trace edema in left upper extremity).   Neurological: She is alert and oriented to person, place, and time. She has normal strength.         Lab Results   Component Value Date     (L) 09/26/2018    K 3.4 (L) 09/26/2018    BUN 12 09/26/2018    CREATININE 0.9 09/26/2018     09/26/2018    HGBA1C 5.6 04/11/2017    CHOL 136 04/24/2018    HDL 74 04/24/2018    LDLCALC 49.2 (L) 04/24/2018    TRIG 64 04/24/2018    CHOLHDL 54.4 (H) 04/24/2018    HGB 10.5 (L) 07/15/2018    HCT 31.3 (L) 07/15/2018     07/15/2018    INR 1.0 10/25/2015       Assessment:     1. Heart failure with preserved ejection fraction    2. Chronic obstructive pulmonary disease, unspecified COPD type    3. Hypertension, benign    4. Tobacco dependence    5. Hyperlipidemia, unspecified hyperlipidemia type    6. Edema, unspecified type    7. Hypokalemia      The patient shows signs of slow improvement.  While she does not have any increased exercise capacity, she does have clear reduction in her edema as well as reduction in her weight.  Her potassium is low and her sodium is slightly low.  The patient will use natural means to increase her potassium at this time.  She will be kept on the same medical regimen for another 4 weeks that re-evaluated.  Plan:     Nalini was seen today for congestive heart failure.    Diagnoses and all orders for this " visit:    Heart failure with preserved ejection fraction  -     Basic metabolic panel; Future; Expected date: 09/26/2018    Chronic obstructive pulmonary disease, unspecified COPD type    Hypertension, benign  -     Basic metabolic panel; Future; Expected date: 09/26/2018    Tobacco dependence    Hyperlipidemia, unspecified hyperlipidemia type    Edema, unspecified type  -     Basic metabolic panel; Future; Expected date: 09/26/2018    Hypokalemia          Parvez Ortez MD  Consultative Cardiology

## 2018-09-26 NOTE — Clinical Note
Thank you for referring Nalini Varma for follow-up of heart failure with preserved ejection fraction (HFpEF). Please see my note for details of this encounter. If you have any questions, please contact me.  Thank you again for the referral.

## 2018-10-02 ENCOUNTER — PATIENT MESSAGE (OUTPATIENT)
Dept: INTERNAL MEDICINE | Facility: CLINIC | Age: 68
End: 2018-10-02

## 2018-10-02 ENCOUNTER — PATIENT OUTREACH (OUTPATIENT)
Dept: OTHER | Facility: OTHER | Age: 68
End: 2018-10-02

## 2018-10-02 DIAGNOSIS — J42 CHRONIC BRONCHITIS, UNSPECIFIED CHRONIC BRONCHITIS TYPE: Primary | ICD-10-CM

## 2018-10-02 DIAGNOSIS — I10 HYPERTENSION, BENIGN: Primary | ICD-10-CM

## 2018-10-02 RX ORDER — FLUTICASONE PROPIONATE AND SALMETEROL XINAFOATE 115; 21 UG/1; UG/1
2 AEROSOL, METERED RESPIRATORY (INHALATION) EVERY 12 HOURS
Qty: 36 G | Refills: 3 | Status: SHIPPED | OUTPATIENT
Start: 2018-10-02 | End: 2019-12-06 | Stop reason: SDUPTHER

## 2018-10-02 NOTE — PROGRESS NOTES
Last 5 Patient Entered Readings                                      Current 30 Day Average: 152/87     Recent Readings 10/1/2018 9/28/2018 9/28/2018 9/25/2018 9/24/2018    SBP (mmHg) 150 161 178 161 150    DBP (mmHg) 88 95 96 89 86    Pulse 76 83 84 83 81          Called patient for digital medicine clinical pharmacist introduction. Avg BP is 152/87 mmHg. Goal <130/<80. For BP management, patient currently takes amlodipine 10 mg QD, torsemide 40 mg QD, and atenolol 50 mg QD. She reports no issues with medications.       1) Continue concurrent medications for now. Dr. Sahni and I discussed possibly switching amlodipine to nifedipine or increasing atenolol to 75 mg QD. Will also discuss with Dr. Ortez.  2) Will defer to lifestyle counseling to digital medicine health .   3) Will follow up with patient in 1-2 weeks to assess BP and need for additional medication adjustments    Radha Sol, Pharm.D.   Digital Medicine Clinical Pharmacist  [phone]

## 2018-10-05 ENCOUNTER — PATIENT OUTREACH (OUTPATIENT)
Dept: OTHER | Facility: OTHER | Age: 68
End: 2018-10-05

## 2018-10-05 DIAGNOSIS — I10 HYPERTENSION, BENIGN: Primary | ICD-10-CM

## 2018-10-05 RX ORDER — ATENOLOL 50 MG/1
TABLET ORAL
Qty: 90 TABLET | Refills: 3
Start: 2018-10-05 | End: 2018-10-15 | Stop reason: SDUPTHER

## 2018-10-05 NOTE — PROGRESS NOTES
Last 5 Patient Entered Readings                                      Current 30 Day Average: 153/86     Recent Readings 10/5/2018 10/2/2018 10/1/2018 9/28/2018 9/28/2018    SBP (mmHg) 158 152 150 161 178    DBP (mmHg) 83 81 88 95 96    Pulse 72 75 76 83 84          Called patient for BP follow up. BP readings above goal of <130/<80. For BP management, she currently takes amlodipine 10 mg QD, atenolol 50 mg QD, torsemide 40 mg QD. When discussing BP med options and that ACE-I/ARB and diuretics are not being used due to history of hyponatremia, patient states that she was on valsartan for years a while back, and had no issues.     1) Discussed medication plan with Dr. Sahni and Dr. Ortez; patient is considered compensated from a left heart point of view. Continue amlodipine and torsemide. Increase atenolol from 50 mg QD to 75 mg QD. Increase in atenolol should not affect lung function since it is B1 selective.   2) Instructed patient to note any changes in lung function/ heart function (SOB, CP, etc.). Report any changes. Verbalized understanding. Go to ED or call [facility] for emergencies. Verbalized understanding.   3) Will defer lifestyle counseling to digital medicine health .   4) Will follow up with patient in 1-2 weeks to assess BP and medication tolerance.       Radha oSl, Pharm.D.   Digital Medicine Clinical Pharmacist  [phone]

## 2018-10-11 ENCOUNTER — PATIENT OUTREACH (OUTPATIENT)
Dept: OTHER | Facility: OTHER | Age: 68
End: 2018-10-11

## 2018-10-11 NOTE — PROGRESS NOTES
Last 5 Patient Entered Readings                                      Current 30 Day Average: 152/87     Recent Readings 10/8/2018 10/7/2018 10/5/2018 10/2/2018 10/1/2018    SBP (mmHg) 152 149 158 152 150    DBP (mmHg) 83 85 83 81 88    Pulse 75 74 72 75 76          Digital Medicine: Health  Follow Up    Left voicemail to follow up with Ms. Nalini Varma.  Current BP average 152/87 mmHg is not at goal, 130/80

## 2018-10-15 ENCOUNTER — TELEPHONE (OUTPATIENT)
Dept: INTERNAL MEDICINE | Facility: CLINIC | Age: 68
End: 2018-10-15

## 2018-10-15 ENCOUNTER — PATIENT MESSAGE (OUTPATIENT)
Dept: INTERNAL MEDICINE | Facility: CLINIC | Age: 68
End: 2018-10-15

## 2018-10-15 ENCOUNTER — PATIENT OUTREACH (OUTPATIENT)
Dept: OTHER | Facility: OTHER | Age: 68
End: 2018-10-15

## 2018-10-15 DIAGNOSIS — I10 HYPERTENSION, BENIGN: ICD-10-CM

## 2018-10-15 DIAGNOSIS — G89.4 CHRONIC PAIN DISORDER: ICD-10-CM

## 2018-10-15 RX ORDER — ATENOLOL 50 MG/1
TABLET ORAL
Qty: 135 TABLET | Refills: 2 | Status: SHIPPED | OUTPATIENT
Start: 2018-10-15 | End: 2018-11-12

## 2018-10-15 RX ORDER — ATENOLOL 50 MG/1
TABLET ORAL
Qty: 135 TABLET | Refills: 2 | Status: SHIPPED | OUTPATIENT
Start: 2018-10-15 | End: 2018-10-15 | Stop reason: SDUPTHER

## 2018-10-15 RX ORDER — ATENOLOL 50 MG/1
50 TABLET ORAL DAILY
Qty: 90 TABLET | Refills: 3 | Status: CANCELLED | OUTPATIENT
Start: 2018-10-15 | End: 2019-10-15

## 2018-10-15 RX ORDER — HYDROCODONE BITARTRATE AND ACETAMINOPHEN 10; 325 MG/1; MG/1
1 TABLET ORAL EVERY 12 HOURS
Qty: 60 TABLET | Refills: 0 | Status: SHIPPED | OUTPATIENT
Start: 2018-10-15 | End: 2018-11-13 | Stop reason: SDUPTHER

## 2018-10-15 NOTE — PROGRESS NOTES
Last 5 Patient Entered Readings                                      Current 30 Day Average: 154/85     Recent Readings 10/15/2018 10/8/2018 10/7/2018 10/5/2018 10/2/2018    SBP (mmHg) 155 152 149 158 152    DBP (mmHg) 79 83 85 83 81    Pulse 71 75 74 72 75        Ms. Varma calls to discuss that she is almost out of atenolol and is unable to refill it. Further complains that she cannot afford a pill cutter, and that she feels that she is wasting many pills because they crumble when she tries to split them. Patient further complains that she cannot afford transportation to  her prescriptions. Had previously sent patient transportation resources.     Alerted pharmD to medication issues.

## 2018-10-15 NOTE — PROGRESS NOTES
Last 5 Patient Entered Readings                                      Current 30 Day Average: 154/85     Recent Readings 10/15/2018 10/8/2018 10/7/2018 10/5/2018 10/2/2018    SBP (mmHg) 155 152 149 158 152    DBP (mmHg) 79 83 85 83 81    Pulse 71 75 74 72 75        Hypertension Medications     amLODIPine (NORVASC) 10 MG tablet TAKE 1 TABLET BY MOUTH ONCE DAILY    atenolol (TENORMIN) 50 MG tablet Take 1.5 tablets (75 mg total) by mouth once daily.    torsemide (DEMADEX) 20 MG Tab Take 2 tablets (40 mg total) by mouth once daily.        Patient called health  to report issues with refilling atenolol. I called patient to follow up. She says the pharmacy wouldn't fill it. Her reasoning was unclear. She states no changes in SOB. She only reports trouble with sinuses that contributes to breathing issues, but says that this not a new issue and does not attribute it to the increase in atenolol dose. Patient states that if she can't get the atenolol tomorrow or the next day she may have to go without it. She also reports financial and transportation issues.     1) Continue current BP medications.   2) Will send in updated atenolol rx.   3) Encouraged patient to follow up on the atenolol rx to prevent being without it.   4) Patient knows to contact me with any non-urgent clinical changes or concerns. Go to ED or call [facility] for emergencies.   5) Will defer lifestyle counseling to digital medicine health .   6) Will follow up with patient in 2 weeks to assess BP and need for additional medication adjustments    Radha Sol, Pharm.D.   Digital Medicine Clinical Pharmacist  [phone]

## 2018-10-15 NOTE — TELEPHONE ENCOUNTER
Not sure what happened - but I was able to [facility] the [facility] - thanks!

## 2018-10-15 NOTE — TELEPHONE ENCOUNTER
"The pharmacy is having an issues filling pt RX for atenolol.  The pharmacy stated that from there system it just shows that PCP changed dosage and they received an message stated that dosage has been change but no refill was attached it just shows a note stating RX change.  Pharmacy stated that PCP will have to reorder RX w/ new dosages and directions .  Staff not able to reorder RX b/c an order lock code popped up stating:   "The following orders cannot be reordered or modified as they are being reordered or modified by other users"    "

## 2018-10-22 NOTE — PROGRESS NOTES
"Last 5 Patient Entered Readings                                      Current 30 Day Average: 155/85     Recent Readings 10/21/2018 10/21/2018 10/17/2018 10/15/2018 10/8/2018    SBP (mmHg) 158 163 161 155 152    DBP (mmHg) 79 101 86 79 83    Pulse 69 71 73 71 75        Digital Medicine: Health  Follow Up    Lifestyle Modifications:    1.Dietary Modifications (Sodium intake <2,000mg/day, food labels, dining out):    Patient reports that she has cut all salt out of her diet, and "that's about it". Feels that she doesn't know what else she can do. Has been eating a lot of vegetables, lean meats, ground turkey stuff zucchini, spaghetti squash. Doesn't eat out at all.    2.Physical Activity:    Feels that there is not much activity she can do. Walks around her house.     3.Medication Therapy: Patient has been compliant with the medication regimen.    4.Patient has the following medication side effects/concerns:   (Frequency/Alleviating factors/Precipitating factors, etc.)     Follow up with Ms. Nalini Cassandra Varma completed. No further questions or concerns. Will continue to follow up to achieve health goals.    Patient has not been smoking for the last week, but is not sure she can keep it up. Has been up a lot in the middle of the night to use the bathroom, and patient typically uses smoking as a sleeping aid. Patient does not usually have trouble going back to sleep, but occasionally is up for 3-4 hours. Sometimes up for 3am onward. Discussed different ideas for occupying herself during these times to avoid smoking- discussed cooking, nghia, or just watching tv. Encouragement provided to keep up with avoiding smoking, and discussed long term benefits for both BP and overall health.   "

## 2018-11-12 ENCOUNTER — PATIENT OUTREACH (OUTPATIENT)
Dept: OTHER | Facility: OTHER | Age: 68
End: 2018-11-12

## 2018-11-12 DIAGNOSIS — I10 HYPERTENSION, BENIGN: ICD-10-CM

## 2018-11-12 RX ORDER — ATENOLOL 50 MG/1
TABLET ORAL
Qty: 135 TABLET | Refills: 2
Start: 2018-11-12 | End: 2018-11-27

## 2018-11-12 NOTE — PROGRESS NOTES
Last 5 Patient Entered Readings                                      Current 30 Day Average: 156/87     Recent Readings 11/9/2018 11/9/2018 11/9/2018 11/1/2018 10/31/2018    SBP (mmHg) 156 164 170 147 161    DBP (mmHg) 88 100 85 76 84    Pulse 76 78 77 71 75        Hypertension Medications     amLODIPine (NORVASC) 10 MG tablet TAKE 1 TABLET BY MOUTH ONCE DAILY    atenolol (TENORMIN) 50 MG tablet Take 1.5 tablets (75 mg total) by mouth once daily.    torsemide (DEMADEX) 20 MG Tab Take 2 tablets (40 mg total) by mouth once daily.     Called patient for BP follow up. Patient says that she urinates a lot at night, but this is not a new issue. She takes torsemide in the morning and doesn't think it really causes her to urinate any more than she usually dose. She says that the swelling has gotten a lot better. She just still has some swelling in her legs. When discussing elevated BP, she says that she thinks it is mostly due to pain in her left arm. The pain is not a new issue. She plans to follow up with ortho for left arm pain.    1) Continue current BP medications. Increase atenolol to 100 mg QD. HR in the 70s. BP avg not at goal. BP management is difficult due to co-morbid conditions and intolerance to ACE-I/ARB or diuretics  2) Instructed patient to note any changes in lung function/ heart function (SOB, CP, etc.). Report any changes. Verbalized understanding. Go to ED or call [facility] for emergencies. Verbalized understanding.  3) Will defer lifestyle counseling to digital medicine health .   4) Will follow up with patient in 2 weeks to assess BP and need for additional medication adjustments.   5) Follow up appointment with cards on 12/10/18.        Radha Sol Pharm.D.   Digital Medicine Clinical Pharmacist  [phone]

## 2018-11-13 ENCOUNTER — PATIENT MESSAGE (OUTPATIENT)
Dept: INTERNAL MEDICINE | Facility: CLINIC | Age: 68
End: 2018-11-13

## 2018-11-13 DIAGNOSIS — E78.5 HYPERLIPIDEMIA, UNSPECIFIED HYPERLIPIDEMIA TYPE: ICD-10-CM

## 2018-11-13 DIAGNOSIS — G89.4 CHRONIC PAIN DISORDER: ICD-10-CM

## 2018-11-13 RX ORDER — HYDROCODONE BITARTRATE AND ACETAMINOPHEN 10; 325 MG/1; MG/1
1 TABLET ORAL EVERY 12 HOURS
Qty: 60 TABLET | Refills: 0 | Status: SHIPPED | OUTPATIENT
Start: 2018-11-14 | End: 2018-12-18 | Stop reason: SDUPTHER

## 2018-11-13 RX ORDER — ATORVASTATIN CALCIUM 40 MG/1
TABLET, FILM COATED ORAL
Qty: 90 TABLET | Refills: 3 | Status: SHIPPED | OUTPATIENT
Start: 2018-11-13 | End: 2019-12-01 | Stop reason: SDUPTHER

## 2018-11-14 ENCOUNTER — PATIENT MESSAGE (OUTPATIENT)
Dept: INTERNAL MEDICINE | Facility: CLINIC | Age: 68
End: 2018-11-14

## 2018-11-15 ENCOUNTER — PATIENT OUTREACH (OUTPATIENT)
Dept: OTHER | Facility: OTHER | Age: 68
End: 2018-11-15

## 2018-11-20 ENCOUNTER — NURSE TRIAGE (OUTPATIENT)
Dept: ADMINISTRATIVE | Facility: CLINIC | Age: 68
End: 2018-11-20

## 2018-11-20 NOTE — TELEPHONE ENCOUNTER
Pt reports hx of COPD and on home O2. For past week has been more sob. Cannot lay down, sob with minimal exertion. Swelling to abdomen, legs not improved since last visit.    Reason for Disposition   MODERATE difficulty breathing (e.g., speaks in phrases, SOB even at rest, pulse 100-120) of new onset or worse than normal    Protocols used: ST BREATHING DIFFICULTY-A-OH

## 2018-11-26 ENCOUNTER — PATIENT OUTREACH (OUTPATIENT)
Dept: OTHER | Facility: OTHER | Age: 68
End: 2018-11-26

## 2018-11-27 ENCOUNTER — TELEPHONE (OUTPATIENT)
Dept: INTERNAL MEDICINE | Facility: CLINIC | Age: 68
End: 2018-11-27

## 2018-11-27 DIAGNOSIS — I10 HYPERTENSION, BENIGN: ICD-10-CM

## 2018-11-27 RX ORDER — CLONIDINE HYDROCHLORIDE 0.1 MG/1
0.1 TABLET ORAL EVERY 12 HOURS
Qty: 60 TABLET | Refills: 0 | Status: SHIPPED | OUTPATIENT
Start: 2018-11-27 | End: 2019-01-07 | Stop reason: SDUPTHER

## 2018-11-27 RX ORDER — ATENOLOL 50 MG/1
75 TABLET ORAL DAILY
Qty: 135 TABLET | Refills: 2 | Status: ON HOLD
Start: 2018-11-27 | End: 2018-12-15 | Stop reason: HOSPADM

## 2018-11-27 NOTE — TELEPHONE ENCOUNTER
Pt with hx of HTN:   Stopped lisinopril - 2/2 to hyperkalemia  Stopped hctz due to hyponatremia    Currently on amlodipine 10, atenolol 75-100mg (see below), and torsemide   BB inc but pt did not nelson due to SOB with hx of COPD  Has chronic LE edema    1. rec dec atenolol back to 75 for now   2.  will plan to add clonidine 0.1mg every 12 hours - if tolerates then will switch to patch in future    3. Please notify pt of changes and cont with digital htn program to titrate medication as needed   4. Pt overdue for appt with me - please arrange       ----- Message from Radha Sol PharmD sent at 11/26/2018 11:18 AM CST -----  Regarding: HTN management  Hi Dr. Sahni,     Patient stated increased SOB since going from atenolol 75 to 100 mg QD. BP remains uncontrolled.  In the past you mentioned switching amlodipine to nifedipine. I would be worried about MAYITO since she already has baseline edema. Hydralazine also may cause edema, but it may be an option. Or clonidine?     Any suggestions for additional BP control? Thanks!!     Radha Sol, Pharm.D.   Digital Medicine Clinical Pharmacist  [phone]

## 2018-11-28 ENCOUNTER — PATIENT MESSAGE (OUTPATIENT)
Dept: INTERNAL MEDICINE | Facility: CLINIC | Age: 68
End: 2018-11-28

## 2018-11-28 NOTE — TELEPHONE ENCOUNTER
Sent message to inform her that changes are being made to her medication. Also ask for day and time to schedule appt

## 2018-12-04 NOTE — PROGRESS NOTES
Last 5 Patient Entered Readings                                      Current 30 Day Average: 164/91     Recent Readings 12/1/2018 11/29/2018 11/20/2018 11/20/2018 11/9/2018    SBP (mmHg) 159 169 173 180 156    DBP (mmHg) 85 89 96 91 88    Pulse 67 71 83 84 76        Patient states that she has been cutting back on smoking, and decided that on 1/1/19 she will quit altogether. States that whether she is ready or not, she will quit, and anticipates this helping her BP number, as well as her quality of life. Encouragement provided. Discussed that I will touch base at this time to continue discussing.     Digital Medicine: Health  Follow Up    Lifestyle Modifications:    1.Dietary Modifications (Sodium intake <2,000mg/day, food labels, dining out):     2.Physical Activity:     3.Medication Therapy: Patient has been compliant with the medication regimen. Patient started clonidine and reduced atenolol yesterday. Has not seen the affects yet    4.Patient has the following medication side effects/concerns:   (Frequency/Alleviating factors/Precipitating factors, etc.)     Follow up with Ms. Nalini Varma completed. No further questions or concerns. Will continue to follow up to achieve health goals.

## 2018-12-10 ENCOUNTER — HOSPITAL ENCOUNTER (INPATIENT)
Facility: HOSPITAL | Age: 68
LOS: 5 days | Discharge: HOME | DRG: 291 | End: 2018-12-15
Attending: EMERGENCY MEDICINE | Admitting: EMERGENCY MEDICINE
Payer: MEDICARE

## 2018-12-10 ENCOUNTER — OFFICE VISIT (OUTPATIENT)
Dept: CARDIOLOGY | Facility: CLINIC | Age: 68
End: 2018-12-10
Payer: MEDICARE

## 2018-12-10 VITALS
HEART RATE: 85 BPM | WEIGHT: 262.13 LBS | DIASTOLIC BLOOD PRESSURE: 77 MMHG | BODY MASS INDEX: 41.14 KG/M2 | SYSTOLIC BLOOD PRESSURE: 165 MMHG | HEIGHT: 67 IN

## 2018-12-10 DIAGNOSIS — I50.33 ACUTE ON CHRONIC HEART FAILURE WITH NORMAL EJECTION FRACTION: ICD-10-CM

## 2018-12-10 DIAGNOSIS — F17.200 TOBACCO DEPENDENCE: ICD-10-CM

## 2018-12-10 DIAGNOSIS — I50.30 HEART FAILURE WITH PRESERVED EJECTION FRACTION: Primary | ICD-10-CM

## 2018-12-10 DIAGNOSIS — I10 HYPERTENSION, BENIGN: ICD-10-CM

## 2018-12-10 DIAGNOSIS — I50.9 CHF (CONGESTIVE HEART FAILURE): Primary | ICD-10-CM

## 2018-12-10 DIAGNOSIS — E78.5 HYPERLIPIDEMIA, UNSPECIFIED HYPERLIPIDEMIA TYPE: ICD-10-CM

## 2018-12-10 DIAGNOSIS — R06.02 SHORTNESS OF BREATH: ICD-10-CM

## 2018-12-10 DIAGNOSIS — J42 CHRONIC BRONCHITIS, UNSPECIFIED CHRONIC BRONCHITIS TYPE: ICD-10-CM

## 2018-12-10 DIAGNOSIS — R60.9 EDEMA: ICD-10-CM

## 2018-12-10 DIAGNOSIS — E87.6 HYPOKALEMIA: ICD-10-CM

## 2018-12-10 DIAGNOSIS — R60.1 ANASARCA: ICD-10-CM

## 2018-12-10 DIAGNOSIS — E87.1 HYPONATREMIA: ICD-10-CM

## 2018-12-10 PROBLEM — J44.1 COPD EXACERBATION: Status: RESOLVED | Noted: 2018-07-13 | Resolved: 2018-12-10

## 2018-12-10 PROBLEM — I50.31 ACUTE DIASTOLIC CONGESTIVE HEART FAILURE: Status: RESOLVED | Noted: 2018-07-13 | Resolved: 2018-12-10

## 2018-12-10 LAB
ALBUMIN SERPL BCP-MCNC: 3.4 G/DL
ALP SERPL-CCNC: 96 U/L
ALT SERPL W/O P-5'-P-CCNC: 13 U/L
ANION GAP SERPL CALC-SCNC: 14 MMOL/L
AST SERPL-CCNC: 14 U/L
BASOPHILS # BLD AUTO: 0.04 K/UL
BASOPHILS NFR BLD: 0.3 %
BILIRUB SERPL-MCNC: 0.4 MG/DL
BNP SERPL-MCNC: 235 PG/ML
BUN SERPL-MCNC: 16 MG/DL
CALCIUM SERPL-MCNC: 9.3 MG/DL
CHLORIDE SERPL-SCNC: 89 MMOL/L
CO2 SERPL-SCNC: 29 MMOL/L
CREAT SERPL-MCNC: 0.9 MG/DL
DIFFERENTIAL METHOD: ABNORMAL
EOSINOPHIL # BLD AUTO: 0.1 K/UL
EOSINOPHIL NFR BLD: 0.9 %
ERYTHROCYTE [DISTWIDTH] IN BLOOD BY AUTOMATED COUNT: 15.9 %
EST. GFR  (AFRICAN AMERICAN): >60 ML/MIN/1.73 M^2
EST. GFR  (NON AFRICAN AMERICAN): >60 ML/MIN/1.73 M^2
GLUCOSE SERPL-MCNC: 97 MG/DL
HCT VFR BLD AUTO: 32.9 %
HGB BLD-MCNC: 10.5 G/DL
IMM GRANULOCYTES # BLD AUTO: 0.06 K/UL
IMM GRANULOCYTES NFR BLD AUTO: 0.5 %
INR PPP: 0.9
LYMPHOCYTES # BLD AUTO: 1.5 K/UL
LYMPHOCYTES NFR BLD: 12.8 %
MCH RBC QN AUTO: 28.2 PG
MCHC RBC AUTO-ENTMCNC: 31.9 G/DL
MCV RBC AUTO: 88 FL
MONOCYTES # BLD AUTO: 0.9 K/UL
MONOCYTES NFR BLD: 7.9 %
NEUTROPHILS # BLD AUTO: 9.2 K/UL
NEUTROPHILS NFR BLD: 77.6 %
NRBC BLD-RTO: 0 /100 WBC
PLATELET # BLD AUTO: 297 K/UL
PMV BLD AUTO: 9.5 FL
POTASSIUM SERPL-SCNC: 3.2 MMOL/L
PROT SERPL-MCNC: 7.3 G/DL
PROTHROMBIN TIME: 9.9 SEC
RBC # BLD AUTO: 3.72 M/UL
SODIUM SERPL-SCNC: 132 MMOL/L
TROPONIN I SERPL DL<=0.01 NG/ML-MCNC: 0.01 NG/ML
WBC # BLD AUTO: 11.91 K/UL

## 2018-12-10 PROCEDURE — 99285 EMERGENCY DEPT VISIT HI MDM: CPT | Mod: 25

## 2018-12-10 PROCEDURE — 99215 OFFICE O/P EST HI 40 MIN: CPT | Mod: S$GLB,,, | Performed by: NURSE PRACTITIONER

## 2018-12-10 PROCEDURE — 3078F DIAST BP <80 MM HG: CPT | Mod: CPTII,S$GLB,, | Performed by: NURSE PRACTITIONER

## 2018-12-10 PROCEDURE — 99999 PR PBB SHADOW E&M-EST. PATIENT-LVL III: CPT | Mod: PBBFAC,,, | Performed by: NURSE PRACTITIONER

## 2018-12-10 PROCEDURE — 85025 COMPLETE CBC W/AUTO DIFF WBC: CPT

## 2018-12-10 PROCEDURE — 99285 EMERGENCY DEPT VISIT HI MDM: CPT | Mod: ,,, | Performed by: EMERGENCY MEDICINE

## 2018-12-10 PROCEDURE — 93010 ELECTROCARDIOGRAM REPORT: CPT | Mod: ,,, | Performed by: INTERNAL MEDICINE

## 2018-12-10 PROCEDURE — 1101F PT FALLS ASSESS-DOCD LE1/YR: CPT | Mod: CPTII,S$GLB,, | Performed by: NURSE PRACTITIONER

## 2018-12-10 PROCEDURE — G0378 HOSPITAL OBSERVATION PER HR: HCPCS

## 2018-12-10 PROCEDURE — 99234 HOSP IP/OBS SM DT SF/LOW 45: CPT | Mod: ,,, | Performed by: INTERNAL MEDICINE

## 2018-12-10 PROCEDURE — 25000003 PHARM REV CODE 250: Performed by: INTERNAL MEDICINE

## 2018-12-10 PROCEDURE — 93005 ELECTROCARDIOGRAM TRACING: CPT

## 2018-12-10 PROCEDURE — 63600175 PHARM REV CODE 636 W HCPCS: Performed by: INTERNAL MEDICINE

## 2018-12-10 PROCEDURE — 84484 ASSAY OF TROPONIN QUANT: CPT

## 2018-12-10 PROCEDURE — 80053 COMPREHEN METABOLIC PANEL: CPT

## 2018-12-10 PROCEDURE — 11000001 HC ACUTE MED/SURG PRIVATE ROOM

## 2018-12-10 PROCEDURE — 63600175 PHARM REV CODE 636 W HCPCS: Performed by: EMERGENCY MEDICINE

## 2018-12-10 PROCEDURE — 3077F SYST BP >= 140 MM HG: CPT | Mod: CPTII,S$GLB,, | Performed by: NURSE PRACTITIONER

## 2018-12-10 PROCEDURE — 96374 THER/PROPH/DIAG INJ IV PUSH: CPT

## 2018-12-10 PROCEDURE — 83880 ASSAY OF NATRIURETIC PEPTIDE: CPT

## 2018-12-10 PROCEDURE — 85610 PROTHROMBIN TIME: CPT

## 2018-12-10 RX ORDER — ATORVASTATIN CALCIUM 20 MG/1
40 TABLET, FILM COATED ORAL DAILY
Status: DISCONTINUED | OUTPATIENT
Start: 2018-12-11 | End: 2018-12-15 | Stop reason: HOSPADM

## 2018-12-10 RX ORDER — FUROSEMIDE 10 MG/ML
40 INJECTION INTRAMUSCULAR; INTRAVENOUS 2 TIMES DAILY
Status: DISCONTINUED | OUTPATIENT
Start: 2018-12-11 | End: 2018-12-11

## 2018-12-10 RX ORDER — FLUTICASONE FUROATE AND VILANTEROL 100; 25 UG/1; UG/1
1 POWDER RESPIRATORY (INHALATION) DAILY
Status: DISCONTINUED | OUTPATIENT
Start: 2018-12-11 | End: 2018-12-15 | Stop reason: HOSPADM

## 2018-12-10 RX ORDER — POTASSIUM CHLORIDE 750 MG/1
60 CAPSULE, EXTENDED RELEASE ORAL ONCE
Status: COMPLETED | OUTPATIENT
Start: 2018-12-10 | End: 2018-12-10

## 2018-12-10 RX ORDER — FAMOTIDINE 20 MG/1
20 TABLET, FILM COATED ORAL 2 TIMES DAILY
Status: DISCONTINUED | OUTPATIENT
Start: 2018-12-10 | End: 2018-12-10

## 2018-12-10 RX ORDER — TORSEMIDE 20 MG/1
40 TABLET ORAL DAILY
Qty: 90 TABLET | Refills: 0 | OUTPATIENT
Start: 2018-12-10 | End: 2018-12-15 | Stop reason: HOSPADM

## 2018-12-10 RX ORDER — ALBUTEROL SULFATE 90 UG/1
1 AEROSOL, METERED RESPIRATORY (INHALATION) EVERY 4 HOURS PRN
Status: DISCONTINUED | OUTPATIENT
Start: 2018-12-10 | End: 2018-12-13

## 2018-12-10 RX ORDER — ENOXAPARIN SODIUM 100 MG/ML
40 INJECTION SUBCUTANEOUS EVERY 24 HOURS
Status: DISCONTINUED | OUTPATIENT
Start: 2018-12-10 | End: 2018-12-15 | Stop reason: HOSPADM

## 2018-12-10 RX ORDER — SODIUM CHLORIDE 0.9 % (FLUSH) 0.9 %
5 SYRINGE (ML) INJECTION
Status: DISCONTINUED | OUTPATIENT
Start: 2018-12-10 | End: 2018-12-15 | Stop reason: HOSPADM

## 2018-12-10 RX ORDER — FUROSEMIDE 10 MG/ML
80 INJECTION INTRAMUSCULAR; INTRAVENOUS
Status: COMPLETED | OUTPATIENT
Start: 2018-12-10 | End: 2018-12-10

## 2018-12-10 RX ORDER — CLONIDINE HYDROCHLORIDE 0.1 MG/1
0.1 TABLET ORAL EVERY 12 HOURS
Status: DISCONTINUED | OUTPATIENT
Start: 2018-12-10 | End: 2018-12-15 | Stop reason: HOSPADM

## 2018-12-10 RX ORDER — AMLODIPINE BESYLATE 10 MG/1
10 TABLET ORAL DAILY
Status: DISCONTINUED | OUTPATIENT
Start: 2018-12-11 | End: 2018-12-15 | Stop reason: HOSPADM

## 2018-12-10 RX ORDER — ASPIRIN 81 MG/1
81 TABLET ORAL DAILY
Status: DISCONTINUED | OUTPATIENT
Start: 2018-12-11 | End: 2018-12-15 | Stop reason: HOSPADM

## 2018-12-10 RX ORDER — FUROSEMIDE 10 MG/ML
80 INJECTION INTRAMUSCULAR; INTRAVENOUS 2 TIMES DAILY
Status: DISCONTINUED | OUTPATIENT
Start: 2018-12-11 | End: 2018-12-10

## 2018-12-10 RX ADMIN — CLONIDINE HYDROCHLORIDE 0.1 MG: 0.1 TABLET ORAL at 09:12

## 2018-12-10 RX ADMIN — FUROSEMIDE 80 MG: 10 INJECTION, SOLUTION INTRAMUSCULAR; INTRAVENOUS at 07:12

## 2018-12-10 RX ADMIN — POTASSIUM CHLORIDE 60 MEQ: 750 CAPSULE, EXTENDED RELEASE ORAL at 07:12

## 2018-12-10 RX ADMIN — ENOXAPARIN SODIUM 40 MG: 100 INJECTION SUBCUTANEOUS at 09:12

## 2018-12-10 NOTE — ED PROVIDER NOTES
Encounter Date: 12/10/2018    SCRIBE #1 NOTE: I, Jennifer David, am scribing for, and in the presence of,  Dr. Hunter. I have scribed the following portions of the note - Other sections scribed: HPI, ROS, PE.       History     Chief Complaint   Patient presents with    Edema     hx chf, on home oxygen     Time patient was seen by the provider: 5:14 PM      The patient is a 68 y.o. female with co-morbidities including: CHF, COPD, on 2L of home oxygen, who presents to the ED with a complaint of persistent BLE edema. The patient reports she was at her cardiologist today and was sent to the ED for further evaluation. She states she was told she needs to be admitted for CHF exacerbation. She notes of SOB, but this is not new. Denies NV/D, fever, cough, chest pain, abdominal pain, dysuria. Patient is a current smoker.  Admitted in July for similar.  She has been doubling her torsemide daily with some improvement.  A ten point review of systems was completed and is negative except as documented above.  Patient denies any other acute medical complaint.        The history is provided by the patient and medical records.     Review of patient's allergies indicates:   Allergen Reactions    Wellbutrin [bupropion hcl] Other (See Comments)     Hyponatremia and hypomagnesia     Zoloft [sertraline] Other (See Comments)     Hyponatremia and hypomagnesia      Past Medical History:   Diagnosis Date    Allergy     Anemia due to gastrointestinal blood loss     LESLY from gastric ulcer due to chronic nsaid use    Anxiety     Arthritis     CKD (chronic kidney disease) stage 3, GFR 30-59 ml/min     followed by [facility] - recieved labs from 2017 and 2/2018    Gastric ulcer     H/O knee surgery 2001    right    Heart failure with preserved ejection fraction 8/20/2018    Hx of psychiatric care     Hyperlipidemia     Hypertension     Hyponatremia     Psychiatric problem     Therapy     Tobacco abuse      Past Surgical  History:   Procedure Laterality Date    ANGIOGRAM, CORONARY ARTERY N/A 7/20/2018    Performed by Willard Roberts MD at [facility] CATH LAB    COLONOSCOPY  2015    CORONARY ANGIOGRAPHY N/A 7/20/2018    Procedure: ANGIOGRAM, CORONARY ARTERY;  Surgeon: Willard Roberts MD;  Location: [facility] CATH LAB;  Service: Cardiovascular;  Laterality: N/A;    ESOPHAGOGASTRODUODENOSCOPY  2015    FRACTURE SURGERY      left femur    JOINT REPLACEMENT  2007    left knee x 2, 2nd performed 2/2 to MRSA infection 3 months after 1st surgery    ORIF FEMUR FRACTURE Left     TUBAL LIGATION       Family History   Problem Relation Age of Onset    Hypertension Mother     Alzheimer's disease Mother     Dementia Mother     Depression Mother     Myasthenia gravis Father     Arthritis Father     Rectal cancer Father     Hypertension Brother     Arthritis Brother     Colon cancer Brother     No Known Problems Son     Cancer Sister         brain    Melanoma Neg Hx     Breast cancer Neg Hx      Social History     Tobacco Use    Smoking status: Current Every Day Smoker     Packs/day: 1.50    Smokeless tobacco: Never Used   Substance Use Topics    Alcohol use: Yes     Alcohol/week: 4.2 oz     Types: 7 Shots of liquor per week     Comment: at least 1 cocktail a day    Drug use: No     Review of Systems   Constitutional: Negative for fever.   HENT: Negative for sore throat.    Respiratory: Positive for shortness of breath. Negative for cough.    Cardiovascular: Positive for leg swelling. Negative for chest pain.   Gastrointestinal: Negative for abdominal pain, diarrhea, nausea and vomiting.   Genitourinary: Negative for dysuria.   Musculoskeletal: Negative for back pain.   Skin: Negative for rash.   Neurological: Negative for weakness.   Hematological: Does not bruise/bleed easily.       Physical Exam     Initial Vitals [12/10/18 1652]   BP Pulse Resp Temp SpO2   (!) 162/75 76 18 97.8 °F (36.6 °C) 99 %      MAP       --          Physical Exam    Nursing note and vitals reviewed.  Constitutional: She appears well-developed and well-nourished. She is not diaphoretic. No distress.   HENT:   Head: Normocephalic and atraumatic.   Nose: Nose normal.   Eyes: Conjunctivae are normal. Right eye exhibits no discharge. Left eye exhibits no discharge.   Neck: Normal range of motion. Neck supple.   Cardiovascular: Normal rate, regular rhythm and normal heart sounds. Exam reveals no gallop and no friction rub.    No murmur heard.  Pulmonary/Chest: No respiratory distress. She has wheezes. She has no rhonchi. She has rales.   Sparse wheezes and rales bilaterally   Abdominal: Soft. She exhibits distension. There is no tenderness. There is no rebound and no guarding.   Musculoskeletal: Normal range of motion. She exhibits edema. She exhibits no tenderness.   Lower extremity with 2+ bilateral edema    Neurological: She is alert and oriented to person, place, and time. GCS score is 15. GCS eye subscore is 4. GCS verbal subscore is 5. GCS motor subscore is 6.   Skin: Skin is warm and dry. No rash noted. No erythema.   Evidence of chronic edema with no infection.    Psychiatric: She has a normal mood and affect. Her behavior is normal. Judgment and thought content normal.         ED Course   Procedures  Labs Reviewed   CBC W/ AUTO DIFFERENTIAL - Abnormal; Notable for the following components:       Result Value    RBC 3.72 (*)     Hemoglobin 10.5 (*)     Hematocrit 32.9 (*)     MCHC 31.9 (*)     RDW 15.9 (*)     Gran # (ANC) 9.2 (*)     Immature Grans (Abs) 0.06 (*)     Gran% 77.6 (*)     Lymph% 12.8 (*)     All other components within normal limits   COMPREHENSIVE METABOLIC PANEL - Abnormal; Notable for the following components:    Sodium 132 (*)     Potassium 3.2 (*)     Chloride 89 (*)     Albumin 3.4 (*)     All other components within normal limits   B-TYPE NATRIURETIC PEPTIDE - Abnormal; Notable for the following components:     (*)     All  other components within normal limits   TROPONIN I   PROTIME-INR     EKG Readings: (Independently Interpreted)   Initial Reading: No STEMI. Rhythm: Normal Sinus Rhythm. Heart Rate: 80. Ectopy: No Ectopy. Conduction: LBBB.   Flipped T waves in III and AVF       Imaging Results          X-Ray Chest PA And Lateral (Final result)  Result time 12/10/18 18:31:57    Final result by Cesar Bolivar DO (12/10/18 18:31:57)                 Impression:      See above      Electronically signed by: Cesar Bolivar DO  Date:    12/10/2018  Time:    18:31             Narrative:    EXAMINATION:  XR CHEST PA AND LATERAL    CLINICAL HISTORY:  Heart failure, unspecified    TECHNIQUE:  PA and lateral views of the chest were performed.    COMPARISON:  08/16/2018    FINDINGS:  Ill-defined decreased attenuation perihilar and basilar lungs while nonspecific concerning for vascular congestion and edema.  There is poor definition lung bases which may be artifactual from superimposed structures underlying effusion not excluded.  There is no pneumothorax.  Continued enlargement of the cardiac silhouette with atherosclerotic aorta.  Visualized osseous structures grossly intact.  Clinical correlation and follow-up advised                                 Medical Decision Making:   Independently Interpreted Test(s):   I have ordered and independently interpreted EKG Reading(s) - see prior notes  Clinical Tests:   Lab Tests: Ordered and Reviewed  Radiological Study: Ordered and Reviewed  Medical Tests: Ordered and Reviewed  ED Management:  68-year-old female with a history of congestive heart failure presents with acute exacerbation.  Sent down from Cardiology Clinic for admission.  Vitals unremarkable. Physical exam as above.  Labs unremarkable. Chest x-ray shows pulmonary edema. Patient given Lasix 80 mg IV x1.  Call cardiologist to evaluate.  Will admit to Internal Medicine.  Did bedside teaching.  All questions answered.  Patient acknowledges  understanding.  Other:   I have discussed this case with another health care provider.       <> Summary of the Discussion: Cardiology  Cards, IM            Scribe Attestation:   Scribe #1: I performed the above scribed service and the documentation accurately describes the services I performed. I attest to the accuracy of the note.               Clinical Impression:   The primary encounter diagnosis was CHF (congestive heart failure). Diagnoses of Edema and Shortness of breath were also pertinent to this visit.      Disposition:   Disposition: Placed in Observation  Condition: Stable                        Lucas Hunter MD  12/10/18 7182

## 2018-12-10 NOTE — ED TRIAGE NOTES
Patient reports to the ED today with reports of CHF exacerbation. Patient states that her doctor told her to come to the ER because she is retaining fluid. Patient has bilateral lower extremity edema. Hx of CHF, COPD. Patient on 2L home oxygen.

## 2018-12-10 NOTE — PROGRESS NOTES
"Ms. Varma is a patient of Dr. Ortez and was last seen in [facility] 9/26/2018.    Subjective:   Patient ID:  Nalini Varma is a 68 y.o. female who presents for follow-up of Heart failure with preserved ejection fraction (2 month f/u )    Problems:  HFpEF, EF 61%  HLD  HTN  Current smoker, 1.5 PPD  COPD    HPI  Ms. Varma is in clinic today to follow up HFpEF.  She was mild to moderately volume overloaded but improving when she was seen by Dr. Ortez on 9/26/18.  Her weight is up 12 lbs since her visit 2.5 months ago.  She reports eating a low salt diet.  She is not weighing herself daily because "I can't see the scale."  Reports using frozen veggies and no sodium options.  She is mostly cooking at home herself.  Patient denies chest pain with exertion or at rest, palpitations, dizziness, syncope, or claudication.  Reports no routine exercise.  She has lower extremity edema and abdominal bloating.  She is sleeping on 3 pillows.  Her baseline is 2 pillows.  She is having a poor urinary response to the torsemide.  Her appetite is poor.  Her BP is running 160s systolic at home.     Review of Systems   Constitution: Positive for malaise/fatigue. Negative for decreased appetite, diaphoresis, weakness, weight gain and weight loss.   Eyes: Negative for visual disturbance.   Cardiovascular: Positive for dyspnea on exertion, leg swelling, orthopnea and paroxysmal nocturnal dyspnea. Negative for chest pain, claudication, irregular heartbeat, near-syncope, palpitations and syncope.        Denies chest pressure   Respiratory: Positive for cough and shortness of breath. Negative for hemoptysis, sleep disturbances due to breathing and snoring.    Endocrine: Negative for cold intolerance and heat intolerance.   Hematologic/Lymphatic: Negative for bleeding problem. Does not bruise/bleed easily.   Musculoskeletal: Negative for myalgias.   Gastrointestinal: Negative for bloating, abdominal pain, anorexia, change in bowel " habit, constipation, diarrhea, nausea and vomiting.   Neurological: Negative for difficulty with concentration, disturbances in coordination, excessive daytime sleepiness, dizziness, headaches, light-headedness, loss of balance and numbness.   Psychiatric/Behavioral: The patient does not have insomnia.        Allergies and current medications updated and reviewed:  Review of patient's allergies indicates:   Allergen Reactions    Wellbutrin [bupropion hcl] Other (See Comments)     Hyponatremia and hypomagnesia     Zoloft [sertraline] Other (See Comments)     Hyponatremia and hypomagnesia      Current Outpatient Medications   Medication Sig    albuterol (VENTOLIN HFA) 90 mcg/actuation inhaler inhale 1-2 puffs as needed every 6 hours for wheezing and shortness of breath    alendronate (FOSAMAX) 70 MG tablet Take 1 tablet (70 mg total) by mouth every 7 days. on Wednesday    amLODIPine (NORVASC) 10 MG tablet TAKE 1 TABLET BY MOUTH ONCE DAILY    aspirin (ECOTRIN) 81 MG EC tablet Take 81 mg by mouth once daily.    atenolol (TENORMIN) 50 MG tablet Take 1.5 tablets (75 mg total) by mouth once daily. Take 2 tablets (100 mg) by mouth once daily.    atorvastatin (LIPITOR) 40 MG tablet TAKE 1 TABLET BY MOUTH EVERY DAILY    biotin 1 mg Cap Take by mouth.    calcium carbonate (OS-SANDRINE) 500 mg calcium (1,250 mg) chewable tablet Take 2 tablets by mouth daily as needed for Heartburn.    cloNIDine (CATAPRES) 0.1 MG tablet Take 1 tablet (0.1 mg total) by mouth every 12 (twelve) hours.    cyanocobalamin 1,000 mcg TbSR Take 1,000 mcg by mouth once daily. (Patient taking differently: Take 1,000 mcg by mouth daily as needed. )    famotidine (PEPCID) 20 MG tablet TAKE 1 TABLET BY MOUTH TWO TIMES A DAY    fluticasone (FLONASE) 50 mcg/actuation nasal spray 1 spray by Each Nare route 2 (two) times daily as needed for Rhinitis.    fluticasone-salmeterol (ADVAIR HFA) 115-21 mcg/actuation HFAA Inhale 2 puffs into the lungs every 12  "(twelve) hours.    guaiFENesin (MUCINEX) 600 mg 12 hr tablet Take 1,200 mg by mouth 2 (two) times daily as needed for Congestion.    HYDROcodone-acetaminophen (NORCO)  mg per tablet Take 1 tablet by mouth every 12 hours as needed for pain    loratadine (CLARITIN) 10 mg tablet Take 10 mg by mouth once daily.    magnesium oxide 400 mg Cap Take 1 capsule by mouth once daily.    torsemide (DEMADEX) 20 MG Tab Take 2 tablets (40 mg total) by mouth once daily.    umeclidinium (INCRUSE ELLIPTA) 62.5 mcg/actuation DsDv Inhale 1 puff into the lungs once daily. Controller    vitamin D 1000 units Tab Take 1,000 Units by mouth once daily.    walker (ULTRA-LIGHT ROLLATOR) Misc 1 each by Misc.(Non-Drug; Combo Route) route once daily.     No current facility-administered medications for this visit.      Objective:     Right Arm BP - Sittin/62 (12/10/18 1611)  Left Arm BP - Sittin/77 (12/10/18 1611)    BP (!) 165/77 (BP Location: Left arm, Patient Position: Sitting, BP Method: Large (Automatic))   Pulse 85   Ht 5' 6.5" (1.689 m)   Wt 118.9 kg (262 lb 2 oz)   BMI 41.67 kg/m²     Physical Exam   Constitutional: She is oriented to person, place, and time. Vital signs are normal. She appears well-developed and well-nourished. She is active. No distress.   HENT:   Head: Normocephalic and atraumatic.   Eyes: Conjunctivae and lids are normal. No scleral icterus.   Neck: Neck supple. Normal carotid pulses, no hepatojugular reflux and no JVD present. Carotid bruit is not present.   Cardiovascular: Normal rate, regular rhythm, S1 normal, S2 normal and intact distal pulses. PMI is not displaced. Exam reveals no gallop and no friction rub.   No murmur heard.  Pulses:       Carotid pulses are 2+ on the right side, and 2+ on the left side.       Radial pulses are 2+ on the right side, and 2+ on the left side.        Dorsalis pedis pulses are 2+ on the right side, and 2+ on the left side.        Posterior tibial " pulses are 1+ on the right side, and 1+ on the left side.   Pulmonary/Chest: Effort normal. No respiratory distress. She has decreased breath sounds in the right lower field and the left lower field. She has no wheezes. She has no rhonchi. She has no rales. She exhibits no tenderness.   Abdominal: Soft. Normal appearance and bowel sounds are normal. She exhibits no distension, no fluid wave, no abdominal bruit, no ascites and no pulsatile midline mass. There is no hepatosplenomegaly. There is no tenderness.   Musculoskeletal: She exhibits edema (BLE +3 pitting into thighs; +1 presacral).   Neurological: She is alert and oriented to person, place, and time. Gait normal.   Skin: Skin is warm, dry and intact. No rash noted. She is not diaphoretic. Nails show no clubbing.   Psychiatric: She has a normal mood and affect. Her speech is normal and behavior is normal. Judgment and thought content normal. Cognition and memory are normal.   Nursing note and vitals reviewed.      Chemistry        Component Value Date/Time     (L) 09/26/2018 1428    K 3.4 (L) 09/26/2018 1428    CL 95 09/26/2018 1428    CO2 24 09/26/2018 1428    BUN 12 09/26/2018 1428    CREATININE 0.9 09/26/2018 1428     09/26/2018 1428        Component Value Date/Time    CALCIUM 8.4 (L) 09/26/2018 1428    ALKPHOS 96 07/13/2018 1903    AST 15 07/13/2018 1903    ALT 14 07/13/2018 1903    BILITOT 0.3 07/13/2018 1903    ESTGFRAFRICA >60.0 09/26/2018 1428    EGFRNONAA >60.0 09/26/2018 1428        Lab Results   Component Value Date    HGBA1C 5.6 04/11/2017         Recent Labs   Lab 04/24/18  0940  07/06/18  1357 07/13/18  1903  07/15/18  0442 07/17/18  1432 07/18/18  0419   WHITE BLOOD CELL COUNT 15.56 H   < >  --  11.20   < > 10.78  --   --    HEMOGLOBIN 11.3 L   < >  --  11.4 L   < > 10.5 L  --   --    HEMATOCRIT 33.9 L   < >  --  33.4 L   < > 31.3 L  --   --    MCV 92   < >  --  90   < > 91  --   --    PLATELETS 240   < >  --  257   < > 235  --   --     BNP  --    < > 212 H 335 H  --   --  597 H 577 H   TSH 1.380  --  1.299  --   --   --   --   --    CHOLESTEROL 136  --   --   --   --   --   --   --    HDL 74  --   --   --   --   --   --   --    LDL CHOLESTEROL 49.2 L  --   --   --   --   --   --   --    TRIGLYCERIDES 64  --   --   --   --   --   --   --    HDL/CHOLESTEROL RATIO 54.4 H  --   --   --   --   --   --   --     < > = values in this interval not displayed.              Test(s) Reviewed  I have reviewed the following in detail:  [] Stress test   [] Angiography   [x] Echocardiogram   [x] Labs   [] Other:         Assessment/Plan:   1. Heart failure with preserved ejection fraction      2. Hyperlipidemia, unspecified hyperlipidemia type      3. Hypertension, benign      4. Hypokalemia      5. Hyponatremia    6. Anasarca    7.  COPD    8. Oxygen dependent     Ms. Varma is in acute decompensated diastolic heart failure that is non-responsive to outpatient oral diuretics. She needs aggressive diuresis that will require close monitoring given h/o hyponatremia.  Would appreciate cardiology referral during admission.  Spoke with cardiology ED fellow via Dr. Tellez and ED attending Dr. Willoughby. Transferred via wheelchair to the ED.     The patient was discussed and examined by Dr. Nguyen    Follow-up in about 6 weeks (around 1/21/2019).

## 2018-12-11 PROBLEM — I50.9 CHF (CONGESTIVE HEART FAILURE): Status: ACTIVE | Noted: 2018-12-11

## 2018-12-11 LAB
ALBUMIN SERPL BCP-MCNC: 2.7 G/DL
ALP SERPL-CCNC: 78 U/L
ALT SERPL W/O P-5'-P-CCNC: 10 U/L
ANION GAP SERPL CALC-SCNC: 10 MMOL/L
ASCENDING AORTA: 2.88 CM
AST SERPL-CCNC: 11 U/L
AV INDEX (PROSTH): 0.53
AV MEAN GRADIENT: 15.74 MMHG
AV PEAK GRADIENT: 27.04 MMHG
AV VALVE AREA: 1.75 CM2
AV VELOCITY RATIO: 0.5
BILIRUB SERPL-MCNC: 0.4 MG/DL
BSA FOR ECHO PROCEDURE: 2.37 M2
BUN SERPL-MCNC: 15 MG/DL
CALCIUM SERPL-MCNC: 8.5 MG/DL
CHLORIDE SERPL-SCNC: 92 MMOL/L
CO2 SERPL-SCNC: 31 MMOL/L
CREAT SERPL-MCNC: 0.9 MG/DL
CV ECHO LV RWT: 0.45 CM
DOP CALC AO PEAK VEL: 2.6 M/S
DOP CALC AO VTI: 62.36 CM
DOP CALC LVOT AREA: 3.3 CM2
DOP CALC LVOT DIAMETER: 2.05 CM
DOP CALC LVOT PEAK VEL: 1.3 M/S
DOP CALC LVOT STROKE VOLUME: 108.9 CM3
DOP CALCLVOT PEAK VEL VTI: 33.01 CM
E WAVE DECELERATION TIME: 288.55 MSEC
E/A RATIO: 1.06
E/E' RATIO: 12
ECHO LV POSTERIOR WALL: 1.08 CM (ref 0.6–1.1)
EST. GFR  (AFRICAN AMERICAN): >60 ML/MIN/1.73 M^2
EST. GFR  (NON AFRICAN AMERICAN): >60 ML/MIN/1.73 M^2
ESTIMATED AVG GLUCOSE: 105 MG/DL
FRACTIONAL SHORTENING: 39 % (ref 28–44)
GLUCOSE SERPL-MCNC: 93 MG/DL
HBA1C MFR BLD HPLC: 5.3 %
INTERVENTRICULAR SEPTUM: 0.97 CM (ref 0.6–1.1)
LA MAJOR: 6.15 CM
LA MINOR: 4.81 CM
LA WIDTH: 4.5 CM
LEFT ATRIUM SIZE: 3.83 CM
LEFT ATRIUM VOLUME INDEX: 34.9 ML/M2
LEFT ATRIUM VOLUME: 79.08 CM3
LEFT INTERNAL DIMENSION IN SYSTOLE: 2.95 CM (ref 2.1–4)
LEFT VENTRICLE DIASTOLIC VOLUME INDEX: 48.3 ML/M2
LEFT VENTRICLE DIASTOLIC VOLUME: 109.55 ML
LEFT VENTRICLE MASS INDEX: 78.7 G/M2
LEFT VENTRICLE SYSTOLIC VOLUME INDEX: 14.8 ML/M2
LEFT VENTRICLE SYSTOLIC VOLUME: 33.51 ML
LEFT VENTRICULAR INTERNAL DIMENSION IN DIASTOLE: 4.84 CM (ref 3.5–6)
LEFT VENTRICULAR MASS: 178.4 G
LV LATERAL E/E' RATIO: 14.67
LV SEPTAL E/E' RATIO: 10.15
MAGNESIUM SERPL-MCNC: 1.1 MG/DL
MV PEAK A VEL: 1.25 M/S
MV PEAK E VEL: 1.32 M/S
PHOSPHATE SERPL-MCNC: 3.4 MG/DL
POTASSIUM SERPL-SCNC: 3.9 MMOL/L
PROT SERPL-MCNC: 5.9 G/DL
RA MAJOR: 5.27 CM
RA PRESSURE: 3 MMHG
RA WIDTH: 3.23 CM
RIGHT VENTRICULAR END-DIASTOLIC DIMENSION: 3.7 CM
SINUS: 2.57 CM
SODIUM SERPL-SCNC: 133 MMOL/L
STJ: 2.81 CM
TDI LATERAL: 0.09
TDI SEPTAL: 0.13
TDI: 0.11
TRICUSPID ANNULAR PLANE SYSTOLIC EXCURSION: 2.33 CM

## 2018-12-11 PROCEDURE — 36415 COLL VENOUS BLD VENIPUNCTURE: CPT

## 2018-12-11 PROCEDURE — 25000003 PHARM REV CODE 250: Performed by: HOSPITALIST

## 2018-12-11 PROCEDURE — 99233 SBSQ HOSP IP/OBS HIGH 50: CPT | Mod: ,,, | Performed by: HOSPITALIST

## 2018-12-11 PROCEDURE — 84100 ASSAY OF PHOSPHORUS: CPT

## 2018-12-11 PROCEDURE — 63600175 PHARM REV CODE 636 W HCPCS: Performed by: INTERNAL MEDICINE

## 2018-12-11 PROCEDURE — 25000003 PHARM REV CODE 250: Performed by: INTERNAL MEDICINE

## 2018-12-11 PROCEDURE — 80053 COMPREHEN METABOLIC PANEL: CPT

## 2018-12-11 PROCEDURE — 83735 ASSAY OF MAGNESIUM: CPT

## 2018-12-11 PROCEDURE — 83036 HEMOGLOBIN GLYCOSYLATED A1C: CPT

## 2018-12-11 PROCEDURE — 63600175 PHARM REV CODE 636 W HCPCS: Performed by: HOSPITALIST

## 2018-12-11 PROCEDURE — 11000001 HC ACUTE MED/SURG PRIVATE ROOM

## 2018-12-11 RX ORDER — CETIRIZINE HYDROCHLORIDE 10 MG/1
10 TABLET ORAL DAILY
Status: DISCONTINUED | OUTPATIENT
Start: 2018-12-12 | End: 2018-12-15 | Stop reason: HOSPADM

## 2018-12-11 RX ORDER — FUROSEMIDE 10 MG/ML
80 INJECTION INTRAMUSCULAR; INTRAVENOUS 3 TIMES DAILY
Status: DISCONTINUED | OUTPATIENT
Start: 2018-12-11 | End: 2018-12-14

## 2018-12-11 RX ORDER — FLUTICASONE PROPIONATE 50 MCG
1 SPRAY, SUSPENSION (ML) NASAL 2 TIMES DAILY PRN
Status: DISCONTINUED | OUTPATIENT
Start: 2018-12-11 | End: 2018-12-15 | Stop reason: HOSPADM

## 2018-12-11 RX ORDER — GUAIFENESIN 600 MG/1
1200 TABLET, EXTENDED RELEASE ORAL 2 TIMES DAILY PRN
Status: DISCONTINUED | OUTPATIENT
Start: 2018-12-11 | End: 2018-12-15 | Stop reason: HOSPADM

## 2018-12-11 RX ORDER — CARVEDILOL 6.25 MG/1
6.25 TABLET ORAL 2 TIMES DAILY
Status: DISCONTINUED | OUTPATIENT
Start: 2018-12-11 | End: 2018-12-15 | Stop reason: HOSPADM

## 2018-12-11 RX ORDER — ATENOLOL 25 MG/1
75 TABLET ORAL DAILY
Status: DISCONTINUED | OUTPATIENT
Start: 2018-12-12 | End: 2018-12-11

## 2018-12-11 RX ORDER — MAGNESIUM SULFATE HEPTAHYDRATE 40 MG/ML
2 INJECTION, SOLUTION INTRAVENOUS ONCE
Status: COMPLETED | OUTPATIENT
Start: 2018-12-11 | End: 2018-12-11

## 2018-12-11 RX ORDER — ACETAMINOPHEN 325 MG/1
650 TABLET ORAL EVERY 6 HOURS PRN
Status: DISCONTINUED | OUTPATIENT
Start: 2018-12-11 | End: 2018-12-15 | Stop reason: HOSPADM

## 2018-12-11 RX ORDER — FUROSEMIDE 10 MG/ML
80 INJECTION INTRAMUSCULAR; INTRAVENOUS 4 TIMES DAILY
Status: DISCONTINUED | OUTPATIENT
Start: 2018-12-11 | End: 2018-12-11

## 2018-12-11 RX ORDER — IBUPROFEN 200 MG
200 TABLET ORAL ONCE
Status: DISCONTINUED | OUTPATIENT
Start: 2018-12-11 | End: 2018-12-15 | Stop reason: HOSPADM

## 2018-12-11 RX ORDER — HYDROCODONE BITARTRATE AND ACETAMINOPHEN 10; 325 MG/1; MG/1
1 TABLET ORAL EVERY 12 HOURS PRN
Status: DISCONTINUED | OUTPATIENT
Start: 2018-12-11 | End: 2018-12-15 | Stop reason: HOSPADM

## 2018-12-11 RX ADMIN — CLONIDINE HYDROCHLORIDE 0.1 MG: 0.1 TABLET ORAL at 09:12

## 2018-12-11 RX ADMIN — MAGNESIUM SULFATE IN WATER 2 G: 40 INJECTION, SOLUTION INTRAVENOUS at 05:12

## 2018-12-11 RX ADMIN — AMLODIPINE BESYLATE 10 MG: 10 TABLET ORAL at 09:12

## 2018-12-11 RX ADMIN — ATORVASTATIN CALCIUM 40 MG: 20 TABLET, FILM COATED ORAL at 09:12

## 2018-12-11 RX ADMIN — FUROSEMIDE 40 MG: 10 INJECTION, SOLUTION INTRAMUSCULAR; INTRAVENOUS at 06:12

## 2018-12-11 RX ADMIN — HYDROCODONE BITARTRATE AND ACETAMINOPHEN 1 TABLET: 10; 325 TABLET ORAL at 08:12

## 2018-12-11 RX ADMIN — FUROSEMIDE 80 MG: 10 INJECTION, SOLUTION INTRAMUSCULAR; INTRAVENOUS at 01:12

## 2018-12-11 RX ADMIN — GUAIFENESIN 1200 MG: 600 TABLET, EXTENDED RELEASE ORAL at 08:12

## 2018-12-11 RX ADMIN — ENOXAPARIN SODIUM 40 MG: 100 INJECTION SUBCUTANEOUS at 05:12

## 2018-12-11 RX ADMIN — HUMAN ALBUMIN MICROSPHERES AND PERFLUTREN 0.66 MG: 10; .22 INJECTION, SOLUTION INTRAVENOUS at 08:12

## 2018-12-11 RX ADMIN — FUROSEMIDE 80 MG: 10 INJECTION, SOLUTION INTRAMUSCULAR; INTRAVENOUS at 08:12

## 2018-12-11 RX ADMIN — CLONIDINE HYDROCHLORIDE 0.1 MG: 0.1 TABLET ORAL at 08:12

## 2018-12-11 NOTE — H&P
[facility] Hospital Medicine  History & Physical    Patient Name: Nalini Varma  MRN: [id number]  Admission Date: 12/10/2018  Attending Physician: Nicolas Valdez MD   Primary Care Provider: Yane Sahni MD    [facility] Medicine Team: [facility] [place] [facility] [place]ARDIOLOGY TEAM C - FELLOWS/CONSULTS Marian Broussard MD     Patient information was obtained from patient, past medical records and ER records.     Subjective:     Principal Problem:Acute on chronic heart failure with normal ejection fraction    Chief Complaint:   Chief Complaint   Patient presents with    Edema     hx chf, on home oxygen        HPI: Pt is a 69 YO lady with history HTN, HFpEF, COPD on 2 L O2 at home who is sent to ED from cardiology office after she presented with worsening lower extremity edema and abdominal bloating   pt was noted to have a weight gain of  12 lbs since her visit 2.5 months ago.Patient denies chest pain with exertion or at rest, palpitations, dizziness, syncope, or claudication, n,v ,d. She also denies medication noncompliance at home . She reports eating a low salt diet.  She is not weighing herself daily. Reports using frozen veggies but otherwise denies any salt intake. She is sleeping on 3 pillows one more than usual and is using 2 L more oxygen than normal.   she states that she has not noted much urin output despite taking her medication.   In ED pt noted  , Troponin 0.035 and bilateral pleural effusions with pulmonary vascular congestion seen on CXR, cardiology was consulted and recommended admission and IV diuresuis    Past Medical History:   Diagnosis Date    Allergy     Anemia due to gastrointestinal blood loss     LESLY from gastric ulcer due to chronic nsaid use    Anxiety     Arthritis     CKD (chronic kidney disease) stage 3, GFR 30-59 ml/min     followed by [facility] study - recieved labs from 2017 and 2/2018    Gastric ulcer     H/O knee surgery 2001    right    Heart failure with  preserved ejection fraction 8/20/2018    Hx of psychiatric care     Hyperlipidemia     Hypertension     Hyponatremia     Psychiatric problem     Therapy     Tobacco abuse        Past Surgical History:   Procedure Laterality Date    ANGIOGRAM, CORONARY ARTERY N/A 7/20/2018    Performed by Willard Roberts MD at [facility] CATH LAB    COLONOSCOPY  2015    CORONARY ANGIOGRAPHY N/A 7/20/2018    Procedure: ANGIOGRAM, CORONARY ARTERY;  Surgeon: Willard Roberts MD;  Location: [facility] CATH LAB;  Service: Cardiovascular;  Laterality: N/A;    ESOPHAGOGASTRODUODENOSCOPY  2015    FRACTURE SURGERY      left femur    JOINT REPLACEMENT  2007    left knee x 2, 2nd performed 2/2 to MRSA infection 3 months after 1st surgery    ORIF FEMUR FRACTURE Left     TUBAL LIGATION         Review of patient's allergies indicates:   Allergen Reactions    Wellbutrin [bupropion hcl] Other (See Comments)     Hyponatremia and hypomagnesia     Zoloft [sertraline] Other (See Comments)     Hyponatremia and hypomagnesia        No current facility-administered medications on file prior to encounter.      Current Outpatient Medications on File Prior to Encounter   Medication Sig    albuterol (VENTOLIN HFA) 90 mcg/actuation inhaler inhale 1-2 puffs as needed every 6 hours for wheezing and shortness of breath    alendronate (FOSAMAX) 70 MG tablet Take 1 tablet (70 mg total) by mouth every 7 days. on Wednesday    amLODIPine (NORVASC) 10 MG tablet TAKE 1 TABLET BY MOUTH ONCE DAILY    aspirin (ECOTRIN) 81 MG EC tablet Take 81 mg by mouth once daily.    atenolol (TENORMIN) 50 MG tablet Take 1.5 tablets (75 mg total) by mouth once daily. Take 2 tablets (100 mg) by mouth once daily.    atorvastatin (LIPITOR) 40 MG tablet TAKE 1 TABLET BY MOUTH EVERY DAILY    biotin 1 mg Cap Take by mouth.    calcium carbonate (OS-SANDRINE) 500 mg calcium (1,250 mg) chewable tablet Take 2 tablets by mouth daily as needed for Heartburn.    cloNIDine (CATAPRES) 0.1  MG tablet Take 1 tablet (0.1 mg total) by mouth every 12 (twelve) hours.    cyanocobalamin 1,000 mcg TbSR Take 1,000 mcg by mouth once daily. (Patient taking differently: Take 1,000 mcg by mouth daily as needed. )    famotidine (PEPCID) 20 MG tablet TAKE 1 TABLET BY MOUTH TWO TIMES A DAY    fluticasone (FLONASE) 50 mcg/actuation nasal spray 1 spray by Each Nare route 2 (two) times daily as needed for Rhinitis.    fluticasone-salmeterol (ADVAIR HFA) 115-21 mcg/actuation HFAA Inhale 2 puffs into the lungs every 12 (twelve) hours.    guaiFENesin (MUCINEX) 600 mg 12 hr tablet Take 1,200 mg by mouth 2 (two) times daily as needed for Congestion.    HYDROcodone-acetaminophen (NORCO)  mg per tablet Take 1 tablet by mouth every 12 hours as needed for pain    loratadine (CLARITIN) 10 mg tablet Take 10 mg by mouth once daily.    magnesium oxide 400 mg Cap Take 1 capsule by mouth once daily.    torsemide (DEMADEX) 20 MG Tab Take 2 tablets (40 mg total) by mouth once daily.    umeclidinium (INCRUSE ELLIPTA) 62.5 mcg/actuation DsDv Inhale 1 puff into the lungs once daily. Controller    vitamin D 1000 units Tab Take 1,000 Units by mouth once daily.    walker (ULTRA-LIGHT ROLLATOR) Misc 1 each by Misc.(Non-Drug; Combo Route) route once daily.     Family History     Problem Relation (Age of Onset)    Alzheimer's disease Mother    Arthritis Father, Brother    Cancer Sister    Colon cancer Brother    Dementia Mother    Depression Mother    Hypertension Mother, Brother    Myasthenia gravis Father    No Known Problems Son    Rectal cancer Father        Tobacco Use    Smoking status: Current Every Day Smoker     Packs/day: 1.50    Smokeless tobacco: Never Used   Substance and Sexual Activity    Alcohol use: Yes     Alcohol/week: 4.2 oz     Types: 7 Shots of liquor per week     Comment: at least 1 cocktail a day    Drug use: No    Sexual activity: Not Currently     Birth control/protection: Abstinence     Review of  Systems   Constitutional: Positive for fatigue. Negative for chills and fever.   Eyes: Negative for pain and itching.   Respiratory: Positive for cough and shortness of breath. Negative for chest tightness.    Cardiovascular: Positive for leg swelling. Negative for chest pain.   Gastrointestinal: Positive for abdominal distention. Negative for abdominal pain, diarrhea, nausea and vomiting.   Endocrine: Negative for polydipsia and polyuria.   Genitourinary: Negative for dysuria and frequency.   Musculoskeletal: Negative for back pain and myalgias.   Neurological: Negative for light-headedness and headaches.   Psychiatric/Behavioral: Negative for confusion. The patient is not nervous/anxious.      Objective:     Vital Signs (Most Recent):  Temp: 97.8 °F (36.6 °C) (12/10/18 1652)  Pulse: 76 (12/10/18 1940)  Resp: 18 (12/10/18 1940)  BP: (!) 176/84 (12/10/18 2129)  SpO2: 99 % (12/10/18 1940) Vital Signs (24h Range):  Temp:  [97.8 °F (36.6 °C)] 97.8 °F (36.6 °C)  Pulse:  [76-85] 76  Resp:  [18-20] 18  SpO2:  [97 %-99 %] 99 %  BP: (137-176)/(62-85) 176/84     Weight: 118.9 kg (262 lb 2 oz)(in clinic)  Body mass index is 42.31 kg/m².    Physical Exam   Constitutional: She is oriented to person, place, and time. No distress.   HENT:   Head: Normocephalic and atraumatic.   Eyes: Right eye exhibits no discharge. Left eye exhibits no discharge.   Neck: JVD present.   Cardiovascular: Normal rate. Exam reveals no gallop and no friction rub.   Pulmonary/Chest: Effort normal. No stridor. She has no wheezes. She has rales.   Abdominal: Soft. Bowel sounds are normal. There is no tenderness. There is no guarding.   Musculoskeletal: She exhibits edema.   Neurological: She is alert and oriented to person, place, and time.   Skin: Skin is warm and dry. She is not diaphoretic.   Psychiatric: She has a normal mood and affect. Her behavior is normal.           Significant Labs:   Recent Lab Results       12/10/18  1727        Immature  Granulocytes 0.5     Immature Grans (Abs) 0.06  Comment:  Mild elevation in immature granulocytes is non specific and   can be seen in a variety of conditions including stress response,   acute inflammation, trauma and pregnancy. Correlation with other   laboratory and clinical findings is essential.       Albumin 3.4     Alkaline Phosphatase 96     ALT 13     Anion Gap 14     AST 14     Baso # 0.04     Basophil% 0.3     Total Bilirubin 0.4  Comment:  For infants and newborns, interpretation of results should be based  on gestational age, weight and in agreement with clinical  observations.  Premature Infant recommended reference ranges:  Up to 24 hours.............<8.0 mg/dL  Up to 48 hours............<12.0 mg/dL  3-5 days..................<15.0 mg/dL  6-29 days.................<15.0 mg/dL         Comment:  Values of less than 100 pg/ml are consistent with non-CHF populations.     BUN, Bld 16     Calcium 9.3     Chloride 89     CO2 29     Creatinine 0.9     Differential Method Automated     eGFR if African American >60.0     eGFR if non  >60.0  Comment:  Calculation used to obtain the estimated glomerular filtration  rate (eGFR) is the CKD-EPI equation.        Eos # 0.1     Eosinophil% 0.9     Glucose 97     Gran # (ANC) 9.2     Gran% 77.6     Hematocrit 32.9     Hemoglobin 10.5     Coumadin Monitoring INR 0.9  Comment:  Coumadin Therapy:  2.0 - 3.0 for INR for all indicators except mechanical heart valves  and antiphospholipid syndromes which should use 2.5 - 3.5.       Lymph # 1.5     Lymph% 12.8     MCH 28.2     MCHC 31.9     MCV 88     Mono # 0.9     Mono% 7.9     MPV 9.5     nRBC 0     Platelets 297     Potassium 3.2     Total Protein 7.3     Protime 9.9     RBC 3.72     RDW 15.9     Sodium 132     Troponin I 0.013  Comment:  The reference interval for Troponin I represents the 99th percentile   cutoff   for our facility and is consistent with 3rd generation assay   performance.        WBC 11.91           Significant Imaging: I have reviewed all pertinent imaging results/findings within the past 24 hours.    Assessment/Plan:     * Acute on chronic heart failure with normal ejection fraction    Pt with history of HFPEF   reports compliance with medication however the diet is not very clear  received a dose of 80 in ED and we will continue w IV diuresis, FU BMP and replace K as needed  Cardiology consulted per request of card clinic  2 D echo ordered       Edema    Refer to CHF       Hypertension, benign      There has been multiple recent changes due to electrolyte abnormalities  continuingclonidine and amlodipine for now  Holding BB in setting of HF exacerbation, most likely can resume tmw as pt becoming more euvolemic    prior anti HTN meds: (Stopped lisinopril - secondary to hyperkalemia   Stopped hctz due to hyponatremia)       Chronic obstructive pulmonary disease    Is using more oxygen than baseline however likely due to volume overload  Not concerned for acute exacerbation of COPD  Continue home dose medication/ inhalers         VTE Risk Mitigation (From admission, onward)        Ordered     enoxaparin injection 40 mg  Daily      12/10/18 2005     IP VTE HIGH RISK PATIENT  Once      12/10/18 2005     Place sequential compression device  Until discontinued      12/10/18 2002             Marian Broussard MD  Department of Hospital Medicine   [facility]

## 2018-12-11 NOTE — HPI
Pt is a 69 YO lady with history HTN, HFpEF, COPD on 2 L O2 at home who is sent to ED from cardiology office after she presented with worsening lower extremity edema and abdominal bloating   pt was noted to have a weight gain of  12 lbs since her visit 2.5 months ago.Patient denies chest pain with exertion or at rest, palpitations, dizziness, syncope, or claudication, n,v ,d. She also denies medication noncompliance at home . She reports eating a low salt diet.  She is not weighing herself daily. Reports using frozen veggies but otherwise denies any salt intake. She is sleeping on 3 pillows one more than usual and is using 2 L more oxygen than normal.  she states that she has not noted much urin output despite taking her medication.   In ED pt noted  , Troponin 0.035 and bilateral pleural effusions with pulmonary vascular congestion seen on CXR, cardiology was consulted and recommended admission and IV diuresuis

## 2018-12-11 NOTE — SUBJECTIVE & OBJECTIVE
Past Medical History:   Diagnosis Date    Allergy     Anemia due to gastrointestinal blood loss     LESLY from gastric ulcer due to chronic nsaid use    Anxiety     Arthritis     CKD (chronic kidney disease) stage 3, GFR 30-59 ml/min     followed by [facility] study - recieved labs from 2017 and 2/2018    Gastric ulcer     H/O knee surgery 2001    right    Heart failure with preserved ejection fraction 8/20/2018    Hx of psychiatric care     Hyperlipidemia     Hypertension     Hyponatremia     Psychiatric problem     Therapy     Tobacco abuse        Past Surgical History:   Procedure Laterality Date    ANGIOGRAM, CORONARY ARTERY N/A 7/20/2018    Performed by Willard Roberts MD at [facility] CATH LAB    COLONOSCOPY  2015    CORONARY ANGIOGRAPHY N/A 7/20/2018    Procedure: ANGIOGRAM, CORONARY ARTERY;  Surgeon: Willard Roberts MD;  Location: [facility] CATH LAB;  Service: Cardiovascular;  Laterality: N/A;    ESOPHAGOGASTRODUODENOSCOPY  2015    FRACTURE SURGERY      left femur    JOINT REPLACEMENT  2007    left knee x 2, 2nd performed 2/2 to MRSA infection 3 months after 1st surgery    ORIF FEMUR FRACTURE Left     TUBAL LIGATION         Review of patient's allergies indicates:   Allergen Reactions    Wellbutrin [bupropion hcl] Other (See Comments)     Hyponatremia and hypomagnesia     Zoloft [sertraline] Other (See Comments)     Hyponatremia and hypomagnesia        No current facility-administered medications on file prior to encounter.      Current Outpatient Medications on File Prior to Encounter   Medication Sig    albuterol (VENTOLIN HFA) 90 mcg/actuation inhaler inhale 1-2 puffs as needed every 6 hours for wheezing and shortness of breath    alendronate (FOSAMAX) 70 MG tablet Take 1 tablet (70 mg total) by mouth every 7 days. on Wednesday    amLODIPine (NORVASC) 10 MG tablet TAKE 1 TABLET BY MOUTH ONCE DAILY    aspirin (ECOTRIN) 81 MG EC tablet Take 81 mg by mouth once daily.    atenolol  (TENORMIN) 50 MG tablet Take 1.5 tablets (75 mg total) by mouth once daily. Take 2 tablets (100 mg) by mouth once daily.    atorvastatin (LIPITOR) 40 MG tablet TAKE 1 TABLET BY MOUTH EVERY DAILY    biotin 1 mg Cap Take by mouth.    calcium carbonate (OS-SANDRINE) 500 mg calcium (1,250 mg) chewable tablet Take 2 tablets by mouth daily as needed for Heartburn.    cloNIDine (CATAPRES) 0.1 MG tablet Take 1 tablet (0.1 mg total) by mouth every 12 (twelve) hours.    cyanocobalamin 1,000 mcg TbSR Take 1,000 mcg by mouth once daily. (Patient taking differently: Take 1,000 mcg by mouth daily as needed. )    famotidine (PEPCID) 20 MG tablet TAKE 1 TABLET BY MOUTH TWO TIMES A DAY    fluticasone (FLONASE) 50 mcg/actuation nasal spray 1 spray by Each Nare route 2 (two) times daily as needed for Rhinitis.    fluticasone-salmeterol (ADVAIR HFA) 115-21 mcg/actuation HFAA Inhale 2 puffs into the lungs every 12 (twelve) hours.    guaiFENesin (MUCINEX) 600 mg 12 hr tablet Take 1,200 mg by mouth 2 (two) times daily as needed for Congestion.    HYDROcodone-acetaminophen (NORCO)  mg per tablet Take 1 tablet by mouth every 12 hours as needed for pain    loratadine (CLARITIN) 10 mg tablet Take 10 mg by mouth once daily.    magnesium oxide 400 mg Cap Take 1 capsule by mouth once daily.    torsemide (DEMADEX) 20 MG Tab Take 2 tablets (40 mg total) by mouth once daily.    umeclidinium (INCRUSE ELLIPTA) 62.5 mcg/actuation DsDv Inhale 1 puff into the lungs once daily. Controller    vitamin D 1000 units Tab Take 1,000 Units by mouth once daily.    walker (ULTRA-LIGHT ROLLATOR) Misc 1 each by Misc.(Non-Drug; Combo Route) route once daily.     Family History     Problem Relation (Age of Onset)    Alzheimer's disease Mother    Arthritis Father, Brother    Cancer Sister    Colon cancer Brother    Dementia Mother    Depression Mother    Hypertension Mother, Brother    Myasthenia gravis Father    No Known Problems Son    Rectal cancer  Father        Tobacco Use    Smoking status: Current Every Day Smoker     Packs/day: 1.50    Smokeless tobacco: Never Used   Substance and Sexual Activity    Alcohol use: Yes     Alcohol/week: 4.2 oz     Types: 7 Shots of liquor per week     Comment: at least 1 cocktail a day    Drug use: No    Sexual activity: Not Currently     Birth control/protection: Abstinence     Review of Systems   Constitutional: Positive for fatigue. Negative for chills and fever.   Eyes: Negative for pain and itching.   Respiratory: Positive for cough and shortness of breath. Negative for chest tightness.    Cardiovascular: Positive for leg swelling. Negative for chest pain.   Gastrointestinal: Positive for abdominal distention. Negative for abdominal pain, diarrhea, nausea and vomiting.   Endocrine: Negative for polydipsia and polyuria.   Genitourinary: Negative for dysuria and frequency.   Musculoskeletal: Negative for back pain and myalgias.   Neurological: Negative for light-headedness and headaches.   Psychiatric/Behavioral: Negative for confusion. The patient is not nervous/anxious.      Objective:     Vital Signs (Most Recent):  Temp: 97.8 °F (36.6 °C) (12/10/18 1652)  Pulse: 76 (12/10/18 1940)  Resp: 18 (12/10/18 1940)  BP: (!) 176/84 (12/10/18 2129)  SpO2: 99 % (12/10/18 1940) Vital Signs (24h Range):  Temp:  [97.8 °F (36.6 °C)] 97.8 °F (36.6 °C)  Pulse:  [76-85] 76  Resp:  [18-20] 18  SpO2:  [97 %-99 %] 99 %  BP: (137-176)/(62-85) 176/84     Weight: 118.9 kg (262 lb 2 oz)(in clinic)  Body mass index is 42.31 kg/m².    Physical Exam   Constitutional: She is oriented to person, place, and time. No distress.   HENT:   Head: Normocephalic and atraumatic.   Eyes: Right eye exhibits no discharge. Left eye exhibits no discharge.   Neck: JVD present.   Cardiovascular: Normal rate. Exam reveals no gallop and no friction rub.   Pulmonary/Chest: Effort normal. No stridor. She has no wheezes. She has rales.   Abdominal: Soft. Bowel  sounds are normal. There is no tenderness. There is no guarding.   Musculoskeletal: She exhibits edema.   Neurological: She is alert and oriented to person, place, and time.   Skin: Skin is warm and dry. She is not diaphoretic.   Psychiatric: She has a normal mood and affect. Her behavior is normal.           Significant Labs:   Recent Lab Results       12/10/18  1727        Immature Granulocytes 0.5     Immature Grans (Abs) 0.06  Comment:  Mild elevation in immature granulocytes is non specific and   can be seen in a variety of conditions including stress response,   acute inflammation, trauma and pregnancy. Correlation with other   laboratory and clinical findings is essential.       Albumin 3.4     Alkaline Phosphatase 96     ALT 13     Anion Gap 14     AST 14     Baso # 0.04     Basophil% 0.3     Total Bilirubin 0.4  Comment:  For infants and newborns, interpretation of results should be based  on gestational age, weight and in agreement with clinical  observations.  Premature Infant recommended reference ranges:  Up to 24 hours.............<8.0 mg/dL  Up to 48 hours............<12.0 mg/dL  3-5 days..................<15.0 mg/dL  6-29 days.................<15.0 mg/dL         Comment:  Values of less than 100 pg/ml are consistent with non-CHF populations.     BUN, Bld 16     Calcium 9.3     Chloride 89     CO2 29     Creatinine 0.9     Differential Method Automated     eGFR if African American >60.0     eGFR if non  >60.0  Comment:  Calculation used to obtain the estimated glomerular filtration  rate (eGFR) is the CKD-EPI equation.        Eos # 0.1     Eosinophil% 0.9     Glucose 97     Gran # (ANC) 9.2     Gran% 77.6     Hematocrit 32.9     Hemoglobin 10.5     Coumadin Monitoring INR 0.9  Comment:  Coumadin Therapy:  2.0 - 3.0 for INR for all indicators except mechanical heart valves  and antiphospholipid syndromes which should use 2.5 - 3.5.       Lymph # 1.5     Lymph% 12.8     MCH 28.2      MCHC 31.9     MCV 88     Mono # 0.9     Mono% 7.9     MPV 9.5     nRBC 0     Platelets 297     Potassium 3.2     Total Protein 7.3     Protime 9.9     RBC 3.72     RDW 15.9     Sodium 132     Troponin I 0.013  Comment:  The reference interval for Troponin I represents the 99th percentile   cutoff   for our facility and is consistent with 3rd generation assay   performance.       WBC 11.91           Significant Imaging: I have reviewed all pertinent imaging results/findings within the past 24 hours.

## 2018-12-11 NOTE — NURSING
Pt reports that she cant take ibuprofen d/t history of bleeding ulcers. Contacted  to contact IMR again. VSS. No other concerns noted. Will continue to monitor pt t/o shift.

## 2018-12-11 NOTE — ASSESSMENT & PLAN NOTE
-pt appears volume overloaded, (+JVD, rales, pitting edema of LE's and weight gain)  -start IV diuresis, s/p 80 mg IV lasix in the ER, recommend starting IV lasix 40 mg QD  -monitor daily weights, strict I/O's and closely follow electrolytes  - pt on adhering to low sodium diet and medication compliance  -recommend obtaining 2D Echo with [facility]  -Cardiology team will continue to follow

## 2018-12-11 NOTE — PROGRESS NOTES
Hospital Medicine  Progress note    Team: [facility] CARDIOLOGY TEAM C - FELLOWS/CONSULTS Arnold Gramajo MD  Admit Date: 12/10/2018  FELICIANO 12/13/2018  Code status: Full Code    Principal Problem:  Acute on chronic heart failure with normal ejection fraction    Interval hx: Patient seen and examined at bedside, no acute events overnight. Patient complaining about bed and neck pain. Seen by Cardiology in the ER who recommended aggressive diuresis.     ROS     Constitutional: Positive for fatigue. Negative for chills and fever.   Eyes: Negative for pain and itching.   Respiratory: Positive for cough and shortness of breath. Negative for chest tightness.    Cardiovascular: Positive for leg swelling. Negative for chest pain.   Gastrointestinal: Positive for abdominal distention. Negative for abdominal pain, diarrhea, nausea and vomiting.   Endocrine: Negative for polydipsia and polyuria.   Genitourinary: Negative for dysuria and frequency.   Musculoskeletal: Negative for back pain and myalgias.   Neurological: Negative for light-headedness and headaches.   Psychiatric/Behavioral: Negative for confusion. The patient is not nervous/anxious.            PEx  Temp:  [96.2 °F (35.7 °C)-98.5 °F (36.9 °C)]   Pulse:  [75-86]   Resp:  [18-20]   BP: (137-186)/(62-85)   SpO2:  [94 %-99 %]   No intake or output data in the 24 hours ending 12/11/18 1521    Constitutional: She is oriented to person, place, and time. No distress.   HENT:   Head: Normocephalic and atraumatic.   Eyes: Right eye exhibits no discharge. Left eye exhibits no discharge.   Neck: JVD present.   Cardiovascular: Normal rate. Exam reveals no gallop and no friction rub.   Pulmonary/Chest: Effort normal. No stridor. She has no wheezes. She has rales.   Abdominal: Soft. Bowel sounds are normal. There is no tenderness. There is no guarding.   Musculoskeletal: She exhibits edema.   Neurological: She is alert and oriented to person, place, and time.   Skin: Skin is warm and  dry. She is not diaphoretic.   Psychiatric: She has a normal mood and affect. Her behavior is normal.         Recent Labs   Lab 12/10/18  1727   WBC 11.91   HGB 10.5*   HCT 32.9*        Recent Labs   Lab 12/10/18  1727 12/11/18  0553   * 133*   K 3.2* 3.9   CL 89* 92*   CO2 29 31*   BUN 16 15   CREATININE 0.9 0.9   GLU 97 93   CALCIUM 9.3 8.5*   MG  --  1.1*   PHOS  --  3.4     Recent Labs   Lab 12/10/18  1727 12/11/18  0553   ALKPHOS 96 78   ALT 13 10   AST 14 11   ALBUMIN 3.4* 2.7*   PROT 7.3 5.9*   BILITOT 0.4 0.4   INR 0.9  --       No results for input(s): POCTGLUCOSE in the last 168 hours.  Recent Labs     12/10/18  1727   TROPONINI 0.013       Scheduled Meds:   amLODIPine  10 mg Oral Daily    aspirin  81 mg Oral Daily    [START ON 12/12/2018] atenolol  75 mg Oral Daily    atorvastatin  40 mg Oral Daily    [START ON 12/12/2018] cetirizine  10 mg Oral Daily    cloNIDine  0.1 mg Oral Q12H    enoxaparin  40 mg Subcutaneous Daily    fluticasone-vilanterol  1 puff Inhalation Daily    furosemide  80 mg Intravenous QID    HYDROcodone-acetaminophen  1 tablet Oral Q12H    ibuprofen  200 mg Oral Once    magnesium sulfate IVPB  2 g Intravenous Once     Continuous Infusions:  As Needed:  acetaminophen, albuterol, fluticasone, guaiFENesin, sodium chloride 0.9%    Active Hospital Problems    Diagnosis  POA    *Acute on chronic heart failure with normal ejection fraction [I50.33]  Yes    CHF (congestive heart failure) [I50.9]  Yes    Edema [R60.9]  Yes    Chronic obstructive pulmonary disease [J44.9]  Yes    Hypertension, benign [I10]  Yes      Resolved Hospital Problems   No resolved problems to display.       Overview      Assessment and Plan for Problems addressed today:    Acute on chronic heart failure with normal ejection fraction     Pt with history of HFPEF   reports compliance with medication however the diet is not very clear  received a dose of 80 in ED and we will continue w IV  diuresis, FU BMP and replace K as needed  Cardiology consulted per request of card clinic  2 D echo, normal EF, indeterminate diastolic function         Edema     Refer to CHF         Hypertension, benign        There has been multiple recent changes due to electrolyte abnormalities  continuingclonidine and amlodipine for now  Holding BB in setting of HF exacerbation, most likely can resume tmw as pt becoming more euvolemic    prior anti HTN meds: (Stopped lisinopril - secondary to hyperkalemia   Stopped hctz due to hyponatremia), continue clonidine          Chronic obstructive pulmonary disease     Is using more oxygen than baseline however likely due to volume overload  Not concerned for acute exacerbation of COPD  Continue home dose medication/ inhalers           Diet: Low sodium  GI PPx:   DVT PPx:    VTE Risk Mitigation (From admission, onward)        Ordered     enoxaparin injection 40 mg  Daily      12/10/18 2005     IP VTE HIGH RISK PATIENT  Once      12/10/18 2005     Place sequential compression device  Until discontinued      12/10/18 2002        Goals of Care: Full code     Discharge plan and follow up: pending diuresis and negative I/O, cardiology recs    Provider    Arnold Robert MD  Staff Hospitalist  Department of Hospital Medicine  [facility]   [phone]

## 2018-12-11 NOTE — PLAN OF CARE
Problem: Adult Inpatient Plan of Care  Goal: Plan of Care Review  Outcome: Ongoing (interventions implemented as appropriate)  Patient alert and oriented. Patient sitting up in bed. Patient resp even and unlabored. Resting quietly. Denies any pain or discomfort at this time.

## 2018-12-11 NOTE — ED NOTES
Pt is upset and reports she was sent from the cardiac clinic to be a direct admit. Pt reports she never coming to this hospital again. Pt reports this is ridiculous and wants to go home. Charge nurse aware.

## 2018-12-11 NOTE — CONSULTS
[facility]  Cardiology  Consult Note    Patient Name: Nalini Varma  MRN: [id number]  Admission Date: 12/10/2018  Hospital Length of Stay: 0 days  Code Status: Prior   Attending Provider: Nicolas Valdez MD   Consulting Provider: Leilani Bright MD  Primary Care Physician: Yane Sahni MD    Patient information was obtained from patient and ER records.     Inpatient consult to Cardiology  Consult performed by: Leilani Bright MD  Consult ordered by: Lucas Hunter MD  Reason for consult: ADHF        Subjective:     Chief Complaint:  Weight Gain, SOB     HPI: 69 yo F with hx of HTN, HFpEF, COPD on 2 Liters oxygen via NC at home, chronic hyponatremia due to SIADH, and active smoker who presents a direct admit from Cardiology clinic for ADHF. Patient's weight is up 12 lbs since her clinic visit 2.5 months ago with Dr. Ortez on 9/26/18. She has been experiencing SOB, MEDELLIN and orthopnea (requiring three pillows at night instead of 2 pillows- her baseline). Of note, pt's systolic BP has been running in 160's at home. Last TTE reported (July 2018) showed severe LAE, eccentric hypertrophy, wall motion abnormalities noted, impaired LV relaxation with grade 2 diastolic dysfunction and normal RV systolic function. Patient's workup in the ER, noted hyponatremia/hypokalemia, normal renal function and LFT's, , Troponin 0.035 and bilateral pleural effusions with pulmonary vascular congestion seen on CXR. Patient being admitted to internal medicine team with Cardiology consult placed for assistance with HF management.     Patient's outpatient diuretic regimen is torsemide 40 mg PO QD. Patient is complaint with low sodium diet, taking home medications and follow up appointments with her physicians. She denies any recent illnesses and travel.     Of note, patient had RHC/LHC in July 2018 that showed elevated PA presure, systemic HTN, normal LV function and normal coronaries.     LVEDP (Pre):  12 mmHg  LVEDP (Post): 12 mmHg  Ejection Fraction: 68%  PW: 29/35 (25)  PA: 49/24 (37)  CO_THERM: 8.01  RV: 59/8  RVEDP: 16   RA: 19/14 (13)  AO: 180/71 (119)  LV: 164/12  LVEDP: 26     Angiographic Results  Diagnostic:          Patient has a right dominant coronary artery.        - Left Main Coronary Artery:             The LM is normal. There is ROME 3 flow.     - Left Anterior Descending Artery:             The LAD is normal. There is ROME 3 flow.     - Left Circumflex Artery:             The LCX is normal. There is ROME 3 flow.     - Right Coronary Artery:             The RCA is normal. There is ROME 3 flow.     - Common Femoral Artery:             The right CFA is normal.    Past Medical History:   Diagnosis Date    Allergy     Anemia due to gastrointestinal blood loss     LESLY from gastric ulcer due to chronic nsaid use    Anxiety     Arthritis     CKD (chronic kidney disease) stage 3, GFR 30-59 ml/min     followed by [facility] study - recieved labs from 2017 and 2/2018    Gastric ulcer     H/O knee surgery 2001    right    Heart failure with preserved ejection fraction 8/20/2018    Hx of psychiatric care     Hyperlipidemia     Hypertension     Hyponatremia     Psychiatric problem     Therapy     Tobacco abuse        Past Surgical History:   Procedure Laterality Date    ANGIOGRAM, CORONARY ARTERY N/A 7/20/2018    Performed by Willard Roberts [facility] at [facility] CATH LAB    COLONOSCOPY  2015    CORONARY ANGIOGRAPHY N/A 7/20/2018    Procedure: ANGIOGRAM, CORONARY ARTERY;  Surgeon: Willard Roberts MD;  Location: [facility] CATH LAB;  Service: Cardiovascular;  Laterality: N/A;    ESOPHAGOGASTRODUODENOSCOPY  2015    FRACTURE SURGERY      left femur    JOINT REPLACEMENT  2007    left knee x 2, 2nd performed 2/2 to MRSA infection 3 months after 1st surgery    ORIF FEMUR FRACTURE Left     TUBAL LIGATION         Review of patient's allergies indicates:   Allergen Reactions    Wellbutrin [bupropion hcl]  Other (See Comments)     Hyponatremia and hypomagnesia     Zoloft [sertraline] Other (See Comments)     Hyponatremia and hypomagnesia        No current facility-administered medications on file prior to encounter.      Current Outpatient Medications on File Prior to Encounter   Medication Sig    albuterol (VENTOLIN HFA) 90 mcg/actuation inhaler inhale 1-2 puffs as needed every 6 hours for wheezing and shortness of breath    alendronate (FOSAMAX) 70 MG tablet Take 1 tablet (70 mg total) by mouth every 7 days. on Wednesday    amLODIPine (NORVASC) 10 MG tablet TAKE 1 TABLET BY MOUTH ONCE DAILY    aspirin (ECOTRIN) 81 MG EC tablet Take 81 mg by mouth once daily.    atenolol (TENORMIN) 50 MG tablet Take 1.5 tablets (75 mg total) by mouth once daily. Take 2 tablets (100 mg) by mouth once daily.    atorvastatin (LIPITOR) 40 MG tablet TAKE 1 TABLET BY MOUTH EVERY DAILY    biotin 1 mg Cap Take by mouth.    calcium carbonate (OS-SANDRINE) 500 mg calcium (1,250 mg) chewable tablet Take 2 tablets by mouth daily as needed for Heartburn.    cloNIDine (CATAPRES) 0.1 MG tablet Take 1 tablet (0.1 mg total) by mouth every 12 (twelve) hours.    cyanocobalamin 1,000 mcg TbSR Take 1,000 mcg by mouth once daily. (Patient taking differently: Take 1,000 mcg by mouth daily as needed. )    famotidine (PEPCID) 20 MG tablet TAKE 1 TABLET BY MOUTH TWO TIMES A DAY    fluticasone (FLONASE) 50 mcg/actuation nasal spray 1 spray by Each Nare route 2 (two) times daily as needed for Rhinitis.    fluticasone-salmeterol (ADVAIR HFA) 115-21 mcg/actuation HFAA Inhale 2 puffs into the lungs every 12 (twelve) hours.    guaiFENesin (MUCINEX) 600 mg 12 hr tablet Take 1,200 mg by mouth 2 (two) times daily as needed for Congestion.    HYDROcodone-acetaminophen (NORCO)  mg per tablet Take 1 tablet by mouth every 12 hours as needed for pain    loratadine (CLARITIN) 10 mg tablet Take 10 mg by mouth once daily.    magnesium oxide 400 mg Cap Take  1 capsule by mouth once daily.    torsemide (DEMADEX) 20 MG Tab Take 2 tablets (40 mg total) by mouth once daily.    umeclidinium (INCRUSE ELLIPTA) 62.5 mcg/actuation DsDv Inhale 1 puff into the lungs once daily. Controller    vitamin D 1000 units Tab Take 1,000 Units by mouth once daily.    walker (ULTRA-LIGHT ROLLATOR) Misc 1 each by Misc.(Non-Drug; Combo Route) route once daily.     Family History     Problem Relation (Age of Onset)    Alzheimer's disease Mother    Arthritis Father, Brother    Cancer Sister    Colon cancer Brother    Dementia Mother    Depression Mother    Hypertension Mother, Brother    Myasthenia gravis Father    No Known Problems Son    Rectal cancer Father        Tobacco Use    Smoking status: Current Every Day Smoker     Packs/day: 1.50    Smokeless tobacco: Never Used   Substance and Sexual Activity    Alcohol use: Yes     Alcohol/week: 4.2 oz     Types: 7 Shots of liquor per week     Comment: at least 1 cocktail a day    Drug use: No    Sexual activity: Not Currently     Birth control/protection: Abstinence     Review of Systems   Constitution: Negative for chills and fever.   HENT: Negative for congestion.    Cardiovascular: Positive for dyspnea on exertion, leg swelling and orthopnea. Negative for chest pain, irregular heartbeat, near-syncope, palpitations and syncope.   Respiratory: Positive for shortness of breath.    Gastrointestinal: Negative for abdominal pain, nausea and vomiting.   Neurological: Negative for dizziness, focal weakness, headaches and light-headedness.     Objective:     Vital Signs (Most Recent):  Temp: 97.8 °F (36.6 °C) (12/10/18 1652)  Pulse: 76 (12/10/18 1940)  Resp: 18 (12/10/18 1940)  BP: (!) 151/75 (12/10/18 1940)  SpO2: 99 % (12/10/18 1940) Vital Signs (24h Range):  Temp:  [97.8 °F (36.6 °C)] 97.8 °F (36.6 °C)  Pulse:  [76-85] 76  Resp:  [18-20] 18  SpO2:  [97 %-99 %] 99 %  BP: (137-174)/(62-85) 151/75     Weight: 118.9 kg (262 lb 2 oz)(in  clinic)  Body mass index is 42.31 kg/m².    SpO2: 99 %  O2 Device (Oxygen Therapy): nasal cannula    No intake or output data in the 24 hours ending 12/10/18 1951    Lines/Drains/Airways     Central Venous Catheter Line                 Percutaneous Central Line Insertion/Assessment - triple lumen  07/14/18 1200 right subclavian 149 days          Peripheral Intravenous Line                 Peripheral IV - Single Lumen 12/10/18 1727 Left Forearm less than 1 day                Physical Exam   Constitutional: She is oriented to person, place, and time.   HENT:   Mouth/Throat: Oropharynx is clear and moist.   Eyes: Pupils are equal, round, and reactive to light.   Neck: JVD present.   Cardiovascular: Normal rate.   Pulmonary/Chest: She has rales.   Abdominal: She exhibits distension.   Musculoskeletal: She exhibits edema.   Neurological: She is alert and oriented to person, place, and time.   Skin: Skin is warm.   Psychiatric: She has a normal mood and affect. Her behavior is normal.   Vitals reviewed.      Significant Labs: All pertinent lab results from the last 24 hours have been reviewed.    Significant Imaging: Echocardiogram:   2D echo with color flow doppler:   Results for orders placed or performed during the hospital encounter of 07/13/18   2D echo with color flow doppler   Result Value Ref Range    QEF 61 55 - 65    Diastolic Dysfunction Yes (A)      Assessment and Plan:     Acute on chronic heart failure with normal ejection fraction    -pt appears volume overloaded, (+JVD, rales, pitting edema of LE's and weight gain)  -start IV diuresis, s/p 80 mg IV lasix in the ER, recommend starting IV lasix 40 mg QD  -monitor daily weights, strict I/O's and closely follow electrolytes  - pt on adhering to low sodium diet and medication compliance  -recommend obtaining 2D Echo with CFD  -Cardiology team will continue to follow        Thank you for your consult.     Leilani Bright MD  Cardiology Fellow, PGY-4    [facility]

## 2018-12-11 NOTE — ASSESSMENT & PLAN NOTE
There has been multiple recent changes due to electrolyte abnormalities  continuingclonidine and amlodipine for now  Holding BB in setting of HF exacerbation, most likely can resume tmw as pt becoming more euvolemic

## 2018-12-11 NOTE — SUBJECTIVE & OBJECTIVE
Past Medical History:   Diagnosis Date    Allergy     Anemia due to gastrointestinal blood loss     LESLY from gastric ulcer due to chronic nsaid use    Anxiety     Arthritis     CKD (chronic kidney disease) stage 3, GFR 30-59 ml/min     followed by [facility] CRI study - recieved labs from 2017 and 2/2018    Gastric ulcer     H/O knee surgery 2001    right    Heart failure with preserved ejection fraction 8/20/2018    Hx of psychiatric care     Hyperlipidemia     Hypertension     Hyponatremia     Psychiatric problem     Therapy     Tobacco abuse        Past Surgical History:   Procedure Laterality Date    ANGIOGRAM, CORONARY ARTERY N/A 7/20/2018    [facility] CATH LAB    COLONOSCOPY  2015    CORONARY ANGIOGRAPHY N/A 7/20/2018    Procedure: ANGIOGRAM, CORONARY ARTERY;  Surgeon: Willard Roberts MD;  Location: [facility] CATH LAB;  Service: Cardiovascular;  Laterality: N/A;    ESOPHAGOGASTRODUODENOSCOPY  2015    FRACTURE SURGERY      left femur    JOINT REPLACEMENT  2007    left knee x 2, 2nd performed 2/2 to MRSA infection 3 months after 1st surgery    ORIF FEMUR FRACTURE Left     TUBAL LIGATION         Review of patient's allergies indicates:   Allergen Reactions    Wellbutrin [bupropion hcl] Other (See Comments)     Hyponatremia and hypomagnesia     Zoloft [sertraline] Other (See Comments)     Hyponatremia and hypomagnesia        No current facility-administered medications on file prior to encounter.      Current Outpatient Medications on File Prior to Encounter   Medication Sig    albuterol (VENTOLIN HFA) 90 mcg/actuation inhaler inhale 1-2 puffs as needed every 6 hours for wheezing and shortness of breath    alendronate (FOSAMAX) 70 MG tablet Take 1 tablet (70 mg total) by mouth every 7 days. on Wednesday    amLODIPine (NORVASC) 10 MG tablet TAKE 1 TABLET BY MOUTH ONCE DAILY    aspirin (ECOTRIN) 81 MG EC tablet Take 81 mg by mouth once daily.    atenolol  (TENORMIN) 50 MG tablet Take 1.5 tablets (75 mg total) by mouth once daily. Take 2 tablets (100 mg) by mouth once daily.    atorvastatin (LIPITOR) 40 MG tablet TAKE 1 TABLET BY MOUTH EVERY DAILY    biotin 1 mg Cap Take by mouth.    calcium carbonate (OS-SANDRINE) 500 mg calcium (1,250 mg) chewable tablet Take 2 tablets by mouth daily as needed for Heartburn.    cloNIDine (CATAPRES) 0.1 MG tablet Take 1 tablet (0.1 mg total) by mouth every 12 (twelve) hours.    cyanocobalamin 1,000 mcg TbSR Take 1,000 mcg by mouth once daily. (Patient taking differently: Take 1,000 mcg by mouth daily as needed. )    famotidine (PEPCID) 20 MG tablet TAKE 1 TABLET BY MOUTH TWO TIMES A DAY    fluticasone (FLONASE) 50 mcg/actuation nasal spray 1 spray by Each Nare route 2 (two) times daily as needed for Rhinitis.    fluticasone-salmeterol (ADVAIR HFA) 115-21 mcg/actuation HFAA Inhale 2 puffs into the lungs every 12 (twelve) hours.    guaiFENesin (MUCINEX) 600 mg 12 hr tablet Take 1,200 mg by mouth 2 (two) times daily as needed for Congestion.    HYDROcodone-acetaminophen (NORCO)  mg per tablet Take 1 tablet by mouth every 12 hours as needed for pain    loratadine (CLARITIN) 10 mg tablet Take 10 mg by mouth once daily.    magnesium oxide 400 mg Cap Take 1 capsule by mouth once daily.    torsemide (DEMADEX) 20 MG Tab Take 2 tablets (40 mg total) by mouth once daily.    umeclidinium (INCRUSE ELLIPTA) 62.5 mcg/actuation DsDv Inhale 1 puff into the lungs once daily. Controller    vitamin D 1000 units Tab Take 1,000 Units by mouth once daily.    walker (ULTRA-LIGHT ROLLATOR) Misc 1 each by Misc.(Non-Drug; Combo Route) route once daily.     Family History     Problem Relation (Age of Onset)    Alzheimer's disease Mother    Arthritis Father, Brother    Cancer Sister    Colon cancer Brother    Dementia Mother    Depression Mother    Hypertension Mother, Brother    Myasthenia gravis Father    No Known Problems Son    Rectal cancer  Father        Tobacco Use    Smoking status: Current Every Day Smoker     Packs/day: 1.50    Smokeless tobacco: Never Used   Substance and Sexual Activity    Alcohol use: Yes     Alcohol/week: 4.2 oz     Types: 7 Shots of liquor per week     Comment: at least 1 cocktail a day    Drug use: No    Sexual activity: Not Currently     Birth control/protection: Abstinence     Review of Systems   Constitution: Negative for chills and fever.   HENT: Negative for congestion.    Cardiovascular: Positive for dyspnea on exertion, leg swelling and orthopnea. Negative for chest pain, irregular heartbeat, near-syncope, palpitations and syncope.   Respiratory: Positive for shortness of breath.    Gastrointestinal: Negative for abdominal pain, nausea and vomiting.   Neurological: Negative for dizziness, focal weakness, headaches and light-headedness.     Objective:     Vital Signs (Most Recent):  Temp: 97.8 °F (36.6 °C) (12/10/18 1652)  Pulse: 76 (12/10/18 1940)  Resp: 18 (12/10/18 1940)  BP: (!) 151/75 (12/10/18 1940)  SpO2: 99 % (12/10/18 1940) Vital Signs (24h Range):  Temp:  [97.8 °F (36.6 °C)] 97.8 °F (36.6 °C)  Pulse:  [76-85] 76  Resp:  [18-20] 18  SpO2:  [97 %-99 %] 99 %  BP: (137-174)/(62-85) 151/75     Weight: 118.9 kg (262 lb 2 oz)(in clinic)  Body mass index is 42.31 kg/m².    SpO2: 99 %  O2 Device (Oxygen Therapy): nasal cannula    No intake or output data in the 24 hours ending 12/10/18 1951    Lines/Drains/Airways     Central Venous Catheter Line                 Percutaneous Central Line Insertion/Assessment - triple lumen  07/14/18 1200 right subclavian 149 days          Peripheral Intravenous Line                 Peripheral IV - Single Lumen 12/10/18 1727 Left Forearm less than 1 day                Physical Exam   Constitutional: She is oriented to person, place, and time.   HENT:   Mouth/Throat: Oropharynx is clear and moist.   Eyes: Pupils are equal, round, and reactive to light.   Neck: JVD present.    Cardiovascular: Normal rate.   Pulmonary/Chest: She has rales.   Abdominal: She exhibits distension.   Musculoskeletal: She exhibits edema.   Neurological: She is alert and oriented to person, place, and time.   Skin: Skin is warm.   Psychiatric: She has a normal mood and affect. Her behavior is normal.   Vitals reviewed.      Significant Labs: All pertinent lab results from the last 24 hours have been reviewed.    Significant Imaging: Echocardiogram:   2D echo with color flow doppler:   Results for orders placed or performed during the hospital encounter of 07/13/18   2D echo with color flow doppler   Result Value Ref Range    QEF 61 55 - 65    Diastolic Dysfunction Yes (A)

## 2018-12-11 NOTE — SUBJECTIVE & OBJECTIVE
"Interval History: Patient reports that she has not made any urine throughout the day and that "lasix doesn't work." MIRELA DANIELSON  Objective:     Vital Signs (Most Recent):  Temp: 98.5 °F (36.9 °C) (12/11/18 1144)  Pulse: 76 (12/11/18 1144)  Resp: 20 (12/11/18 1144)  BP: (!) 151/70 (12/11/18 1144)  SpO2: 95 % (12/11/18 1144) Vital Signs (24h Range):  Temp:  [96.2 °F (35.7 °C)-98.5 °F (36.9 °C)] 98.5 °F (36.9 °C)  Pulse:  [75-86] 76  Resp:  [18-20] 20  SpO2:  [94 %-99 %] 95 %  BP: (137-186)/(62-85) 151/70     Weight: 118.8 kg (262 lb)  Body mass index is 41.04 kg/m².     SpO2: 95 %  O2 Device (Oxygen Therapy): nasal cannula    No intake or output data in the 24 hours ending 12/11/18 1459    Lines/Drains/Airways     Central Venous Catheter Line                 Percutaneous Central Line Insertion/Assessment - triple lumen  07/14/18 1200 right subclavian 150 days          Peripheral Intravenous Line                 Peripheral IV - Single Lumen 12/10/18 1727 Left Forearm less than 1 day                Physical Exam   Constitutional: She is oriented to person, place, and time. She appears well-developed and well-nourished. No distress.   obese   HENT:   Head: Normocephalic and atraumatic.   Nose: Nose normal.   Mouth/Throat: Oropharynx is clear and moist. No oropharyngeal exudate.   Periorbital edema inferior to left eye   Eyes: EOM are normal. Pupils are equal, round, and reactive to light.   Neck: Neck supple. No JVD present. No tracheal deviation present.   Cardiovascular: Normal rate, regular rhythm, normal heart sounds and intact distal pulses.   1+ pitting edema   Pulmonary/Chest: She has wheezes (diffuse).   Abdominal: Soft. She exhibits distension. There is no tenderness. There is no rebound and no guarding.   Neurological: She is alert and oriented to person, place, and time.   Skin: Skin is warm and dry. She is not diaphoretic.   Vitals reviewed.      Significant Labs:   BMP:   Recent Labs   Lab 12/10/18  3995 " 12/11/18  0553   GLU 97 93   * 133*   K 3.2* 3.9   CL 89* 92*   CO2 29 31*   BUN 16 15   CREATININE 0.9 0.9   CALCIUM 9.3 8.5*   MG  --  1.1*    and CBC   Recent Labs   Lab 12/10/18  1727   WBC 11.91   HGB 10.5*   HCT 32.9*          Significant Imaging:   Echo 12/11/18:  · Normal left ventricular systolic function. The estimated ejection fraction is 65%  · Indeterminate left ventricular diastolic function.  · No wall motion abnormalities.  · Normal right ventricular systolic function.  · Moderate left atrial enlargement.  · Normal central venous pressure (3 mm Hg).  · Trace tricuspid regurgitation.  · Trace aortic regurgitation.  · Aortic valve not well seen. increase gradient acorss the valve related to increase LVOT flow, there is no stenosis by doppler.

## 2018-12-11 NOTE — NURSING TRANSFER
Nursing Transfer Note           Received pt from ER via stretcher to OBS RM 3080. A/O x4. Pt amb w/ assistance x 1 to bed. Nad noted. BP elevated, will get pt comfortable & reassess BP later in the shift, other VSS. 3L NC in place. Pt reports SOB w/ exertion. Extension tubing applied to nasal cannula at t his time to aid w/ ambulation to t/o room & to commode. Abd rounded, distended, non-tender. BS present/hypoactive. Denies N/V. +2 edema noted to BLE , skin taut & warm to touch. Safety/fall measures in place. Pt encouraged to call for assistance when needed. No other immediate concerns noted. POC reviewed w/ patient, verb understanding.

## 2018-12-11 NOTE — PROGRESS NOTES
Consent obtained. optison given ivp via left forearm sl for imaging. Denies transfusion rxn. Tolerated well. Sl flushed before and after with 10 cc ns.

## 2018-12-11 NOTE — NURSING
Received call from JESE Vaz concerning pain medication for patient. Ordered Tylenol 650mg PO PRN Q6H. No other immediate concerns noted. Will continue to monitor pt t/o shift.

## 2018-12-11 NOTE — HPI
69 yo F with hx of HTN, HFpEF, COPD on 2 Liters oxygen via NC at home, chronic hyponatremia due to SIADH, and active smoker who presents a direct admit from Cardiology clinic for ADHF. Patient's weight is up 12 lbs since her clinic visit 2.5 months ago with Dr. Ortez on 9/26/18. She has been experiencing SOB, MEDELLIN and orthopnea (requiring three pillows at night instead of 2 pillows- her baseline). Of note, pt's systolic BP has been running in 160's at home. Last TTE reported (July 2018) showed severe LAE, eccentric hypertrophy, wall motion abnormalities noted, impaired LV relaxation with grade 2 diastolic dysfunction and normal RV systolic function. Patient's workup in the ER, noted hyponatremia/hypokalemia, normal renal function and LFT's, , Troponin 0.035 and bilateral pleural effusions with pulmonary vascular congestion seen on CXR. Patient being admitted to internal medicine team with Cardiology consult placed for assistance with HF management.     Patient's outpatient diuretic regimen is torsemide 40 mg PO QD. Patient is complaint with low sodium diet, taking home medications and follow up appointments with her physicians. She denies any recent illnesses and travel.     Of note, patient had RHC/LHC in July 2018 that showed elevated PA presure, systemic HTN, normal LV function and normal coronaries.     LVEDP (Pre): 12 mmHg  LVEDP (Post): 12 mmHg  Ejection Fraction: 68%  PW: 29/35 (25)  PA: 49/24 (37)  CO_THERM: 8.01  RV: 59/8  RVEDP: 16   RA: 19/14 (13)  AO: 180/71 (119)  LV: 164/12  LVEDP: 26     Angiographic Results  Diagnostic:          Patient has a right dominant coronary artery.        - Left Main Coronary Artery:             The LM is normal. There is ROME 3 flow.     - Left Anterior Descending Artery:             The LAD is normal. There is ROME 3 flow.     - Left Circumflex Artery:             The LCX is normal. There is ROME 3 flow.     - Right Coronary Artery:             The RCA is normal.  There is ROME 3 flow.     - Common Femoral Artery:             The right CFA is normal.

## 2018-12-11 NOTE — ED NOTES
Patient on cardiac monitor  Normal sinus rhythm on cardiac monitor  Confirmed with war room   HR 88

## 2018-12-11 NOTE — NURSING
Contacted  to contact IMR again at this time.  reported that previous MD was no longer on call & that she would page oncoming MD. No other concerns noted. Will continue to monitor.

## 2018-12-11 NOTE — NURSING
Patient reported pain to chronic pain to BLE as 7/10. Contacted MD on call for team MYRON Broussard MD notified of patients status/ complaints and ordered ibuprofen 200 mg PO X 1 dose. No other concerns noted. Will continue to monitor pt t/o shift.

## 2018-12-11 NOTE — ASSESSMENT & PLAN NOTE
Pt with history of HFPEF   reports compliance with medication however the diet is not very clear  received a dose of 80 in ED and we will continue w IV diuresis, FU BMP and replace K as needed  Cardiology consulted per request of card clinic  2 D echo ordered

## 2018-12-11 NOTE — PLAN OF CARE
12/11/18 1452   Discharge Assessment   Assessment Type Discharge Planning Assessment   Confirmed/corrected address and phone number on facesheet? Yes   Assessment information obtained from? Patient;Medical Record   Expected Length of Stay (days) 3   Communicated expected length of stay with patient/caregiver yes   Prior to hospitilization cognitive status: Alert/Oriented   Prior to hospitalization functional status: Assistive Equipment   Current cognitive status: Alert/Oriented   Current Functional Status: Assistive Equipment   Lives With alone   Able to Return to Prior Arrangements yes   Is patient able to care for self after discharge? Yes   Who are your caregiver(s) and their phone number(s)? self care   Patient's perception of discharge disposition home or selfcare   Readmission Within the Last 30 Days no previous admission in last 30 days   Patient currently being followed by outpatient case management? No   Patient currently receives any other outside agency services? No   Equipment Currently Used at Home oxygen;rollator  (O2 through [facility])   Do you have any problems affording any of your prescribed medications? No   Is the patient taking medications as prescribed? yes   Does the patient have transportation home? Yes   Transportation Anticipated other (see comments)  (Patient will call [place], which is how she normally gets around)   Does the patient receive services at the [facility]? No   Discharge Plan A Home   Discharge Plan B Home Health   Patient/Family in Agreement with Plan yes

## 2018-12-11 NOTE — ASSESSMENT & PLAN NOTE
Is using more oxygen than baseline however likely due to volume overload  Not concerned for acute exacerbation of COPD  Continue home dose medication/ inhalers

## 2018-12-12 LAB
ALBUMIN SERPL BCP-MCNC: 2.8 G/DL
ALP SERPL-CCNC: 74 U/L
ALT SERPL W/O P-5'-P-CCNC: 8 U/L
ANION GAP SERPL CALC-SCNC: 12 MMOL/L
AST SERPL-CCNC: 10 U/L
BASOPHILS # BLD AUTO: 0.02 K/UL
BASOPHILS NFR BLD: 0.2 %
BILIRUB SERPL-MCNC: 0.3 MG/DL
BUN SERPL-MCNC: 17 MG/DL
CALCIUM SERPL-MCNC: 8.4 MG/DL
CHLORIDE SERPL-SCNC: 94 MMOL/L
CO2 SERPL-SCNC: 29 MMOL/L
CREAT SERPL-MCNC: 1.1 MG/DL
DIFFERENTIAL METHOD: ABNORMAL
EOSINOPHIL # BLD AUTO: 0.1 K/UL
EOSINOPHIL NFR BLD: 1.7 %
ERYTHROCYTE [DISTWIDTH] IN BLOOD BY AUTOMATED COUNT: 16.2 %
EST. GFR  (AFRICAN AMERICAN): 59.2 ML/MIN/1.73 M^2
EST. GFR  (NON AFRICAN AMERICAN): 51.3 ML/MIN/1.73 M^2
GLUCOSE SERPL-MCNC: 95 MG/DL
HCT VFR BLD AUTO: 29.1 %
HGB BLD-MCNC: 9.1 G/DL
IMM GRANULOCYTES # BLD AUTO: 0.04 K/UL
IMM GRANULOCYTES NFR BLD AUTO: 0.5 %
LYMPHOCYTES # BLD AUTO: 1.6 K/UL
LYMPHOCYTES NFR BLD: 18.9 %
MAGNESIUM SERPL-MCNC: 1.4 MG/DL
MCH RBC QN AUTO: 28.6 PG
MCHC RBC AUTO-ENTMCNC: 31.3 G/DL
MCV RBC AUTO: 92 FL
MONOCYTES # BLD AUTO: 0.9 K/UL
MONOCYTES NFR BLD: 10.6 %
NEUTROPHILS # BLD AUTO: 5.7 K/UL
NEUTROPHILS NFR BLD: 68.1 %
NRBC BLD-RTO: 0 /100 WBC
PHOSPHATE SERPL-MCNC: 3.8 MG/DL
PLATELET # BLD AUTO: 254 K/UL
PMV BLD AUTO: 10.3 FL
POTASSIUM SERPL-SCNC: 3.6 MMOL/L
PROT SERPL-MCNC: 5.9 G/DL
RBC # BLD AUTO: 3.18 M/UL
SODIUM SERPL-SCNC: 135 MMOL/L
WBC # BLD AUTO: 8.41 K/UL

## 2018-12-12 PROCEDURE — 25000242 PHARM REV CODE 250 ALT 637 W/ HCPCS: Performed by: INTERNAL MEDICINE

## 2018-12-12 PROCEDURE — 25000003 PHARM REV CODE 250: Performed by: HOSPITALIST

## 2018-12-12 PROCEDURE — G8988 SELF CARE GOAL STATUS: HCPCS | Mod: CI

## 2018-12-12 PROCEDURE — 83735 ASSAY OF MAGNESIUM: CPT

## 2018-12-12 PROCEDURE — G8987 SELF CARE CURRENT STATUS: HCPCS | Mod: CI

## 2018-12-12 PROCEDURE — 99233 SBSQ HOSP IP/OBS HIGH 50: CPT | Mod: ,,, | Performed by: HOSPITALIST

## 2018-12-12 PROCEDURE — 63600175 PHARM REV CODE 636 W HCPCS: Performed by: INTERNAL MEDICINE

## 2018-12-12 PROCEDURE — 25000003 PHARM REV CODE 250: Performed by: INTERNAL MEDICINE

## 2018-12-12 PROCEDURE — 80053 COMPREHEN METABOLIC PANEL: CPT

## 2018-12-12 PROCEDURE — G8980 MOBILITY D/C STATUS: HCPCS | Mod: CJ

## 2018-12-12 PROCEDURE — 36415 COLL VENOUS BLD VENIPUNCTURE: CPT

## 2018-12-12 PROCEDURE — 11000001 HC ACUTE MED/SURG PRIVATE ROOM

## 2018-12-12 PROCEDURE — 97161 PT EVAL LOW COMPLEX 20 MIN: CPT

## 2018-12-12 PROCEDURE — G8989 SELF CARE D/C STATUS: HCPCS | Mod: CI

## 2018-12-12 PROCEDURE — 25000242 PHARM REV CODE 250 ALT 637 W/ HCPCS: Performed by: HOSPITALIST

## 2018-12-12 PROCEDURE — G8978 MOBILITY CURRENT STATUS: HCPCS | Mod: CJ

## 2018-12-12 PROCEDURE — 84100 ASSAY OF PHOSPHORUS: CPT

## 2018-12-12 PROCEDURE — 63600175 PHARM REV CODE 636 W HCPCS: Performed by: HOSPITALIST

## 2018-12-12 PROCEDURE — G8979 MOBILITY GOAL STATUS: HCPCS | Mod: CJ

## 2018-12-12 PROCEDURE — 97165 OT EVAL LOW COMPLEX 30 MIN: CPT

## 2018-12-12 PROCEDURE — 85025 COMPLETE CBC W/AUTO DIFF WBC: CPT

## 2018-12-12 RX ORDER — LISINOPRIL 2.5 MG/1
2.5 TABLET ORAL DAILY
Status: DISCONTINUED | OUTPATIENT
Start: 2018-12-12 | End: 2018-12-15 | Stop reason: HOSPADM

## 2018-12-12 RX ADMIN — CLONIDINE HYDROCHLORIDE 0.1 MG: 0.1 TABLET ORAL at 08:12

## 2018-12-12 RX ADMIN — LISINOPRIL 2.5 MG: 2.5 TABLET ORAL at 03:12

## 2018-12-12 RX ADMIN — CETIRIZINE HYDROCHLORIDE 10 MG: 10 TABLET, FILM COATED ORAL at 08:12

## 2018-12-12 RX ADMIN — FUROSEMIDE 80 MG: 10 INJECTION, SOLUTION INTRAMUSCULAR; INTRAVENOUS at 03:12

## 2018-12-12 RX ADMIN — FLUTICASONE FUROATE AND VILANTEROL TRIFENATATE 1 PUFF: 100; 25 POWDER RESPIRATORY (INHALATION) at 08:12

## 2018-12-12 RX ADMIN — CARVEDILOL 6.25 MG: 6.25 TABLET, FILM COATED ORAL at 08:12

## 2018-12-12 RX ADMIN — FLUTICASONE PROPIONATE 50 MCG: 50 SPRAY, METERED NASAL at 08:12

## 2018-12-12 RX ADMIN — AMLODIPINE BESYLATE 10 MG: 10 TABLET ORAL at 08:12

## 2018-12-12 RX ADMIN — ASPIRIN 81 MG: 81 TABLET, COATED ORAL at 08:12

## 2018-12-12 RX ADMIN — FUROSEMIDE 80 MG: 10 INJECTION, SOLUTION INTRAMUSCULAR; INTRAVENOUS at 08:12

## 2018-12-12 RX ADMIN — ATORVASTATIN CALCIUM 40 MG: 20 TABLET, FILM COATED ORAL at 08:12

## 2018-12-12 RX ADMIN — ENOXAPARIN SODIUM 40 MG: 100 INJECTION SUBCUTANEOUS at 06:12

## 2018-12-12 RX ADMIN — GUAIFENESIN 1200 MG: 600 TABLET, EXTENDED RELEASE ORAL at 08:12

## 2018-12-12 RX ADMIN — HYDROCODONE BITARTRATE AND ACETAMINOPHEN 1 TABLET: 10; 325 TABLET ORAL at 08:12

## 2018-12-12 NOTE — PLAN OF CARE
Problem: Physical Therapy Goal  Goal: Physical Therapy Goal  Outcome: Outcome(s) achieved Date Met: 12/12/18  PT yue/ANN. Recommend HHPT services.

## 2018-12-12 NOTE — SUBJECTIVE & OBJECTIVE
Interval History: I/Os inconsistently tracked as patient has been ambulating to the bathroom without alerting nurse.     Review of Systems   Constitution: Negative for chills and fever.   HENT: Negative for congestion.    Cardiovascular: Positive for dyspnea on exertion, leg swelling and orthopnea. Negative for chest pain, irregular heartbeat, near-syncope, palpitations and syncope.   Respiratory: Positive for shortness of breath.    Gastrointestinal: Negative for abdominal pain, nausea and vomiting.   Neurological: Negative for dizziness, focal weakness, headaches and light-headedness.     Objective:     Vital Signs (Most Recent):  Temp: 97.9 °F (36.6 °C) (12/12/18 0809)  Pulse: 68 (12/12/18 0838)  Resp: 18 (12/12/18 0838)  BP: (!) 157/72 (12/12/18 0809)  SpO2: (!) 94 % (12/12/18 0809) Vital Signs (24h Range):  Temp:  [97.6 °F (36.4 °C)-98.5 °F (36.9 °C)] 97.9 °F (36.6 °C)  Pulse:  [68-84] 68  Resp:  [18-22] 18  SpO2:  [94 %-96 %] 94 %  BP: (126-157)/(59-72) 157/72     Weight: 118.8 kg (262 lb)  Body mass index is 41.04 kg/m².     SpO2: (!) 94 %  O2 Device (Oxygen Therapy): nasal cannula      Intake/Output Summary (Last 24 hours) at 12/12/2018 0958  Last data filed at 12/12/2018 0500  Gross per 24 hour   Intake 120 ml   Output 650 ml   Net -530 ml       Lines/Drains/Airways     Central Venous Catheter Line                 Percutaneous Central Line Insertion/Assessment - triple lumen  07/14/18 1200 right subclavian 150 days          Peripheral Intravenous Line                 Peripheral IV - Single Lumen 12/10/18 1727 Left Forearm 1 day                Physical Exam   Constitutional: She is oriented to person, place, and time. She appears well-developed and well-nourished. No distress.   obese   HENT:   Head: Normocephalic and atraumatic.   Nose: Nose normal.   Mouth/Throat: Oropharynx is clear and moist. No oropharyngeal exudate.   Periorbital edema inferior to left eye   Eyes: EOM are normal. Right eye exhibits no  discharge. Left eye exhibits no discharge.   Neck: Neck supple. No JVD present. No tracheal deviation present.   Cardiovascular: Normal rate, regular rhythm, normal heart sounds and intact distal pulses.   1+ pitting edema   Pulmonary/Chest: She has no wheezes. She has rales (bibasilar).   Abdominal: Soft. Bowel sounds are normal. She exhibits distension. There is no tenderness. There is no rebound and no guarding.   Neurological: She is alert and oriented to person, place, and time.   Skin: Skin is warm and dry. She is not diaphoretic.   Vitals reviewed.      Significant Labs:   BMP:   Recent Labs   Lab 12/10/18  1727 12/11/18  0553 12/12/18  0412   GLU 97 93 95   * 133* 135*   K 3.2* 3.9 3.6   CL 89* 92* 94*   CO2 29 31* 29   BUN 16 15 17   CREATININE 0.9 0.9 1.1   CALCIUM 9.3 8.5* 8.4*   MG  --  1.1* 1.4*    and CBC   Recent Labs   Lab 12/10/18  1727 12/12/18  0412   WBC 11.91 8.41   HGB 10.5* 9.1*   HCT 32.9* 29.1*    254       Significant Imaging:

## 2018-12-12 NOTE — PLAN OF CARE
Problem: Occupational Therapy Goal  Goal: Occupational Therapy Goal  Pt is currently performing ADLs, functional mobility & t/fs at baseline and displays age-appropriate strength, endurance & balance. Acute OT services are not recommended at this time however would benefit from HHOT to ensure safe transition to home environment. Pt has all DME needs.     Outcome: Ongoing (interventions implemented as appropriate)  Evaluation completed. Initiate POC.   Paula toussaint OT  12/12/2018

## 2018-12-12 NOTE — ASSESSMENT & PLAN NOTE
67 y/o F w/ HFpEF presents from clinic with ADHF. Patient hypertensive and has had electrolyte imbalances with lisinopril and HCTZ in the past (hyperkalemia, hyponatremia, respectively). BNP 200s but not accurate in patients with obesity. Normal EF on echo with no wall motion abnormalities. CVP 3. Patient appeared volume overloaded on admission (+JVD, rales, pitting edema of LE's and weight gain); minimal diuresis thus far.     Plan:  - Continue diuresis, recommended furosemide 80 mg IV TID. Assessed with bladder scan to assure no urinary retention; no urinary retention reported by nursing.   - recommend carvedilol 6.25 mg PO BID for HTN  - daily standing weight  - strict I/Os  - 2L/day fluid restriction  - 2 g Na restriction  - maintain K>4, Mg >2

## 2018-12-12 NOTE — PLAN OF CARE
Problem: Adult Inpatient Plan of Care  Goal: Plan of Care Review  Outcome: Ongoing (interventions implemented as appropriate)  POC reviewed with patient v/s stable, fall and safety precautions maintained, call light within reach, will cont to monitor.

## 2018-12-12 NOTE — PROGRESS NOTES
"[facility]  Cardiology  Progress Note    Patient Name: Nalini Varma  MRN: [id number]  Admission Date: 12/10/2018  Hospital Length of Stay: 0 days  Code Status: Full Code   Attending Physician: Arnold Gramajo MD   Primary Care Physician: Yane Sahni MD  Expected Discharge Date: 12/13/2018  Principal Problem:Acute on chronic heart failure with normal ejection fraction    Subjective:     Hospital Course:   No notes on file    Interval History: Patient reports that she has not made any urine throughout the day and that "lasix doesn't work." NAEON.   ROS  Objective:     Vital Signs (Most Recent):  Temp: 98.5 °F (36.9 °C) (12/11/18 1144)  Pulse: 76 (12/11/18 1144)  Resp: 20 (12/11/18 1144)  BP: (!) 151/70 (12/11/18 1144)  SpO2: 95 % (12/11/18 1144) Vital Signs (24h Range):  Temp:  [96.2 °F (35.7 °C)-98.5 °F (36.9 °C)] 98.5 °F (36.9 °C)  Pulse:  [75-86] 76  Resp:  [18-20] 20  SpO2:  [94 %-99 %] 95 %  BP: (137-186)/(62-85) 151/70     Weight: 118.8 kg (262 lb)  Body mass index is 41.04 kg/m².     SpO2: 95 %  O2 Device (Oxygen Therapy): nasal cannula    No intake or output data in the 24 hours ending 12/11/18 1459    Lines/Drains/Airways     Central Venous Catheter Line                 Percutaneous Central Line Insertion/Assessment - triple lumen  07/14/18 1200 right subclavian 150 days          Peripheral Intravenous Line                 Peripheral IV - Single Lumen 12/10/18 1727 Left Forearm less than 1 day                Physical Exam   Constitutional: She is oriented to person, place, and time. She appears well-developed and well-nourished. No distress.   obese   HENT:   Head: Normocephalic and atraumatic.   Nose: Nose normal.   Mouth/Throat: Oropharynx is clear and moist. No oropharyngeal exudate.   Periorbital edema inferior to left eye   Eyes: EOM are normal. Pupils are equal, round, and reactive to light.   Neck: Neck supple. No JVD present. No tracheal deviation present. "   Cardiovascular: Normal rate, regular rhythm, normal heart sounds and intact distal pulses.   1+ pitting edema   Pulmonary/Chest: She has wheezes (diffuse).   Abdominal: Soft. She exhibits distension. There is no tenderness. There is no rebound and no guarding.   Neurological: She is alert and oriented to person, place, and time.   Skin: Skin is warm and dry. She is not diaphoretic.   Vitals reviewed.      Significant Labs:   BMP:   Recent Labs   Lab 12/10/18  1727 12/11/18  0553   GLU 97 93   * 133*   K 3.2* 3.9   CL 89* 92*   CO2 29 31*   BUN 16 15   CREATININE 0.9 0.9   CALCIUM 9.3 8.5*   MG  --  1.1*    and CBC   Recent Labs   Lab 12/10/18  1727   WBC 11.91   HGB 10.5*   HCT 32.9*          Significant Imaging:   Echo 12/11/18:  · Normal left ventricular systolic function. The estimated ejection fraction is 65%  · Indeterminate left ventricular diastolic function.  · No wall motion abnormalities.  · Normal right ventricular systolic function.  · Moderate left atrial enlargement.  · Normal central venous pressure (3 mm Hg).  · Trace tricuspid regurgitation.  · Trace aortic regurgitation.  · Aortic valve not well seen. increase gradient acorss the valve related to increase LVOT flow, there is no stenosis by doppler.    Assessment and Plan:       * Acute on chronic heart failure with normal ejection fraction    67 y/o F w/ HFpEF presents from clinic with ADHF. Patient hypertensive and has had electrolyte imbalances with lisinopril and HCTZ in the past (hyperkalemia, hyponatremia, respectively). BNP 200s but not accurate in patients with obesity. Normal EF on echo with no wall motion abnormalities. CVP 3. Patient appeared volume overloaded on admission (+JVD, rales, pitting edema of LE's and weight gain); minimal diuresis thus far.     Plan:  - Continue diuresis, recommended furosemide 80 mg IV TID. Assessed with bladder scan to assure no urinary retention; no urinary retention reported by nursing.   -  recommend adding carvedilol 6.25 mg PO BID for HTN  - daily standing weight  - strict I/Os  - 2L/day fluid restriction  - 2 g Na restriction  - maintain K>4, Mg >2         VTE Risk Mitigation (From admission, onward)        Ordered     enoxaparin injection 40 mg  Daily      12/10/18 2005     IP VTE HIGH RISK PATIENT  Once      12/10/18 2005     Place sequential compression device  Until discontinued      12/10/18 2002          Alexy Reynolds MD   PGY-1  [phone] (pager)  12/11/2018 6:13 PM  Cardiology  [facility]

## 2018-12-12 NOTE — PT/OT/SLP EVAL
"Physical Therapy Evaluation/discharge summary    Patient Name:  Nalini Varma   MRN:  [id number]    Recommendations:     Discharge Recommendations:  (HHPT)   Discharge Equipment Recommendations: none   Barriers to discharge: Decreased caregiver support- lives alone    Assessment:     Nalini Varma is a 68 y.o. female admitted with a medical diagnosis of Acute on chronic heart failure with normal ejection fraction.  She presents with the following impairments/functional limitations:  impaired endurance, impaired cardiopulmonary response to activity. At this time, pt's major deficit is impaired endurance. Otherwise, she is mobile via rollator and has been for at least a year. She reports she does not need therapy, "I get around just fine."  If pt increases her activity outside of her room with use of her rollator, she will successfully improve her endurance, thereby avoiding further deconditioning.    Rehab Prognosis: Good; patient would benefit from acute skilled PT services to address these deficits and reach maximum level of function.    Recent Surgery: * No surgery found *      Plan:     During this hospitalization, patient to be seen (n/a) to address the identified rehab impairments via   and progress toward the following goals:      · Plan of Care Expires:  (n/a)    Subjective     Chief Complaint: None  Patient/Family Comments/goals: Return home.   Pain/Comfort:  · Pain Rating 1: 0/10    Patients cultural, spiritual, Catholic conflicts given the current situation: no    Living Environment:  Lives alone in 1 SH, 1 threshold to enter. Bathes/dresses herself using a bath bench in tub-shower at home. Uses a rollator, home O2, bath bench.  Pt's transportation is public via [employer] or [employer] (grocery).  Pt is a retired . Has a friend that visits her every other day.   Prior to admission, patients level of function was mod I with rollator/tub bench.  Equipment used at home: oxygen, rollator, bath bench.  DME " "owned (not currently used): none.  Upon discharge, patient will have assistance from friend (every other day).    Objective:     Communicated with nurse prior to session.  Patient found all lines intact, call button in reach and seated at edge of bed telemetry, oxygen(2L)  upon PT entry to room.    General Precautions: Standard, fall   Orthopedic Precautions:N/A   Braces: N/A     Exams:  · Cognitive Exam:  Patient is oriented to Person, Place, Time and Situation  · Postural Exam:  Patient presented with the following abnormalities: -       Forward head  · -       Posterior pelvic tilt  · RUE ROM: WNL  · RUE Strength: WNL  · LUE ROM: WNL  · LUE Strength: WNL  · RLE ROM: WNL  · RLE Strength: WNL  · LLE ROM: WNL except hip flexion/quadriceps strength 1/5 against gravity (while seated) due to breaking her femur 6 years ago. "Joint is out of alignment".   · LLE Strength: WNL except hip flexion/quadriceps strength 1/5 against gravity (while seated) due to breaking her femur 6 years ago. "Joint is out of alignment".     Functional Mobility:  · Bed Mobility:  Supine to Sit: modified independence  · Transfers:  Sit to Stand:  modified independence with 4 wheeled walker  · Gait: 60 feet within room with rollator and supervision  · Balance: Able to sit while brushing teeth at sink- mod I    AM-PAC 6 CLICK MOBILITY  Total Score:21     Patient left seated at edge of bed with all lines intact, call button in reach and PCT present.        History:     Past Medical History:   Diagnosis Date    Allergy     Anemia due to gastrointestinal blood loss     LESLY from gastric ulcer due to chronic nsaid use    Anxiety     Arthritis     CKD (chronic kidney disease) stage 3, GFR 30-59 ml/min     followed by [facility] study - recieved labs from 2017 and 2/2018    Gastric ulcer     H/O knee surgery 2001    right    Heart failure with preserved ejection fraction 8/20/2018    Hx of psychiatric care     Hyperlipidemia     Hypertension  "    Hyponatremia     Psychiatric problem     Therapy     Tobacco abuse        Past Surgical History:   Procedure Laterality Date    ANGIOGRAM, CORONARY ARTERY N/A 7/20/2018    Performed by Willard Roberts MD at [facility] C[facility] LAB    COLONOSCOPY  2015    CORONARY ANGIOGRAPHY N/A 7/20/2018    Procedure: ANGIOGRAM, CORONARY ARTERY;  Surgeon: Willard Roberts MD;  Location: [facility] CATH LAB;  Service: Cardiovascular;  Laterality: N/A;    ESOPHAGOGASTRODUODENOSCOPY  2015    FRACTURE SURGERY      left femur    JOINT REPLACEMENT  2007    left knee x 2, 2nd performed 2/2 to MRSA infection 3 months after 1st surgery    ORIF FEMUR FRACTURE Left     TUBAL LIGATION         Clinical Decision Making:     History  Co-morbidities and personal factors that may impact the plan of care Examination  Body Structures and Functions, activity limitations and participation restrictions that may impact the plan of care Clinical Presentation   Decision Making/ Complexity Score   Co-morbidities:   [x] Time since onset of injury / illness / exacerbation  [x] Status of current condition  []Patient's cognitive status and safety concerns    [x] Multiple Medical Problems (see med hx)  Personal Factors:   [x] Patient's age  [] Prior Level of function   [] Patient's home situation (environment and family support)  [] Patient's level of motivation  [] Expected progression of patient      HISTORY:(criteria)    [] 48074 - no personal factors/history    [] 41619 - has 1-2 personal factor/comorbidity     [x] 61969 - has >3 personal factor/comorbidity     Body Regions:  [] Objective examination findings  [] Head     []  Neck  [] Trunk   [] Upper Extremity  [x] Lower Extremity    Body Systems:  [] For communication ability, affect, cognition, language, and learning style: the assessment of the ability to make needs known, consciousness, orientation (person, place, and time), expected emotional /behavioral responses, and learning preferences (eg,  learning barriers, education  needs)  [x] For the neuromuscular system: a general assessment of gross coordinated movement (eg, balance, gait, locomotion, transfers, and transitions) and motor function  (motor control and motor learning)  [] For the musculoskeletal system: the assessment of gross symmetry, gross range of motion, gross strength, height, and weight  [] For the integumentary system: the assessment of pliability(texture), presence of scar formation, skin color, and skin integrity  [] For cardiovascular/pulmonary system: the assessment of heart rate, respiratory rate, blood pressure, and edema     Activity limitations:    [] Patient's cognitive status and saf ety concerns          [] Status of current condition      [] Weight bearing restriction  [] Cardiopulmunary Restriction    Participation Restrictions:   [] Goals and goal agreement with the patient     [] Rehab potential (prognosis) and probable outcome      Examination of Body System: (criteria)    [x] 38415 - addressing 1-2 elements    [] 13402 - addressing a total of 3 or more elements     [] 81979 -  Addressing a total of 4 or more elements         Clinical Presentation: (criteria)  Stable - 34718     On examination of body system using standardized tests and measures patient presents with 1-2 elements from any of the following: body structures and functions, activity limitations, and/or participation restrictions.  Leading to a clinical presentation that is considered stable and/or uncomplicated                              Clinical Decision Making  (Eval Complexity):  Low- 89352     Time Tracking:     PT Received On: 12/12/18  PT Start Time: 1136     PT Stop Time: 1156  PT Total Time (min): 20 min     Billable Minutes: Evaluation 20      Alize Ma, PT  12/12/2018

## 2018-12-12 NOTE — PROGRESS NOTES
Hospital Medicine  Progress note    [place]: [facility] [place] Arnold Gramajo MD  Admit Date: 12/10/2018  FELICIANO 12/13/2018  Code status: Full Code    Principal Problem:  Acute on chronic heart failure with normal ejection fraction    Interval hx: Patient seen and examined at bedside, no acute events overnight. Seen by Cardiology who recommends IV Lasix and purewick catheter, added low dose Valsartan.     ROS     Constitutional: Positive for fatigue. Negative for chills and fever.   Eyes: Negative for pain and itching.   Respiratory: Positive for cough and shortness of breath. Negative for chest tightness.    Cardiovascular: Positive for leg swelling. Negative for chest pain.   Gastrointestinal: Positive for abdominal distention. Negative for abdominal pain, diarrhea, nausea and vomiting.   Endocrine: Negative for polydipsia and polyuria.   Genitourinary: Negative for dysuria and frequency.   Musculoskeletal: Negative for back pain and myalgias.   Neurological: Negative for light-headedness and headaches.   Psychiatric/Behavioral: Negative for confusion. The patient is not nervous/anxious.            PEx  Temp:  [97.6 °F (36.4 °C)-98.3 °F (36.8 °C)]   Pulse:  [68-84]   Resp:  [18-22]   BP: (126-157)/(59-80)   SpO2:  [94 %-96 %]     Intake/Output Summary (Last 24 hours) at 12/12/2018 1511  Last data filed at 12/12/2018 0500  Gross per 24 hour   Intake 120 ml   Output 650 ml   Net -530 ml       Constitutional: She is oriented to person, place, and time. No distress.   HENT:   Head: Normocephalic and atraumatic.   Eyes: Right eye exhibits no discharge. Left eye exhibits no discharge.   Neck: JVD present.   Cardiovascular: Normal rate. Exam reveals no gallop and no friction rub.   Pulmonary/Chest: Effort normal. No stridor. She has no wheezes. She has rales.   Abdominal: Soft. Bowel sounds are normal. There is no tenderness. There is no guarding.   Musculoskeletal: She exhibits edema.   Neurological: She is alert and oriented  to person, place, and time.   Skin: Skin is warm and dry. She is not diaphoretic.   Psychiatric: She has a normal mood and affect. Her behavior is normal.         Recent Labs   Lab 12/10/18  1727 12/12/18  0412   WBC 11.91 8.41   HGB 10.5* 9.1*   HCT 32.9* 29.1*    254     Recent Labs   Lab 12/10/18  1727 12/11/18  0553 12/12/18 0412   * 133* 135*   K 3.2* 3.9 3.6   CL 89* 92* 94*   CO2 29 31* 29   BUN 16 15 17   CREATININE 0.9 0.9 1.1   GLU 97 93 95   CALCIUM 9.3 8.5* 8.4*   MG  --  1.1* 1.4*   PHOS  --  3.4 3.8     Recent Labs   Lab 12/10/18  1727 12/11/18  0553 12/12/18 0412   ALKPHOS 96 78 74   ALT 13 10 8*   AST 14 11 10   ALBUMIN 3.4* 2.7* 2.8*   PROT 7.3 5.9* 5.9*   BILITOT 0.4 0.4 0.3   INR 0.9  --   --       No results for input(s): POCTGLUCOSE in the last 168 hours.  Recent Labs     12/10/18  1727   TROPONINI 0.013       Scheduled Meds:   amLODIPine  10 mg Oral Daily    aspirin  81 mg Oral Daily    atorvastatin  40 mg Oral Daily    carvedilol  6.25 mg Oral BID    cetirizine  10 mg Oral Daily    cloNIDine  0.1 mg Oral Q12H    enoxaparin  40 mg Subcutaneous Daily    fluticasone-vilanterol  1 puff Inhalation Daily    furosemide  80 mg Intravenous TID    ibuprofen  200 mg Oral Once    lisinopril  2.5 mg Oral Daily     Continuous Infusions:  As Needed:  acetaminophen, albuterol, fluticasone, guaiFENesin, HYDROcodone-acetaminophen, sodium chloride 0.9%    Active Hospital Problems    Diagnosis  POA    *Acute on chronic heart failure with normal ejection fraction [I50.33]  Yes    CHF (congestive heart failure) [I50.9]  Yes    Edema [R60.9]  Yes    Hypomagnesemia [E83.42]  Yes    Chronic obstructive pulmonary disease [J44.9]  Yes    Hypertension, benign [I10]  Yes      Resolved Hospital Problems   No resolved problems to display.       Overview      Assessment and Plan for Problems addressed today:    Acute on chronic heart failure with normal ejection fraction     Pt with history of  HFPEF   reports compliance with medication however the diet is not very clear  received a dose of 80 in ED and we will continue w IV diuresis, FU BMP and replace K as needed  pure wick catheter ordered for Intake and output  Cardiology consulted per request of card clinic  adding low dose valsartan  2 D echo, normal EF, indeterminate diastolic function     Edema     Refer to CHF         Hypertension, benign        There has been multiple recent changes due to electrolyte abnormalities  continuingclonidine and amlodipine for now  Holding BB in setting of HF exacerbation, most likely can resume tmw as pt becoming more euvolemic    prior anti HTN meds: (Stopped lisinopril - secondary to hyperkalemia   Stopped hctz due to hyponatremia), will add low dose valsartan, continue clonidine          Chronic obstructive pulmonary disease     Is using more oxygen than baseline however likely due to volume overload  Not concerned for acute exacerbation of COPD  Continue home dose medication/ inhalers       Diet: Low sodium  GI PPx:   DVT PPx:    VTE Risk Mitigation (From admission, onward)        Ordered     enoxaparin injection 40 mg  Daily      12/10/18 2005     IP VTE HIGH RISK PATIENT  Once      12/10/18 2005     Place sequential compression device  Until discontinued      12/10/18 2002        Goals of Care: Full code     Discharge plan and follow up: pending diuresis and negative I/O, cardiology recs    Provider    Arnold Robert MD  Staff Hospitalist  Department of Hospital Medicine  [facility]   [phone]

## 2018-12-12 NOTE — PT/OT/SLP EVAL
"Occupational Therapy   Evaluation and Discharge Note    Name: Nalini Varma  MRN: [id number]  Admitting Diagnosis:  Acute on chronic heart failure with normal ejection fraction      Recommendations:     Discharge Recommendations: (HHOT)  Discharge Equipment Recommendations:  none  Barriers to discharge:  None    History:     Occupational Profile:  Living Environment: Pt lives alone in apartment with threshold to enter; bathroom contains tub-shower combo with bath bench  Previous level of function: PTA, pt reports using rollator during functional mobility and independent - mod I with self care tasks. She does not drive drive and reports she takes Uber/lift or friend will provide transportation to doctors appointments/ grocery store.  Roles and Routines: " I don't go out much"   Equipment Used at home:  oxygen, rollator, bath bench  Assistance upon Discharge: Pt will have limited assistance from friend upon discharge    Past Medical History:   Diagnosis Date    Allergy     Anemia due to gastrointestinal blood loss     LESLY from gastric ulcer due to chronic nsaid use    Anxiety     Arthritis     CKD (chronic kidney disease) stage 3, GFR 30-59 ml/min     followed by [facility] study - recieved labs from 2017 and 2/2018    Gastric ulcer     H/O knee surgery 2001    right    Heart failure with preserved ejection fraction 8/20/2018    Hx of psychiatric care     Hyperlipidemia     Hypertension     Hyponatremia     Psychiatric problem     Therapy     Tobacco abuse        Past Surgical History:   Procedure Laterality Date    ANGIOGRAM, CORONARY ARTERY N/A 7/20/2018    Performed by [facility] at [facility] CATH LAB    COLONOSCOPY  2015    CORONARY ANGIOGRAPHY N/A 7/20/2018    Procedure: ANGIOGRAM, CORONARY ARTERY;  Surgeon: Willard Roberts MD;  Location: [facility] CATH LAB;  Service: Cardiovascular;  Laterality: N/A;    ESOPHAGOGASTRODUODENOSCOPY  2015    FRACTURE SURGERY      left femur    JOINT " "REPLACEMENT  2007    left knee x 2, 2nd performed 2/2 to MRSA infection 3 months after 1st surgery    ORIF FEMUR FRACTURE Left     TUBAL LIGATION         Subjective     Chief Complaint: " the only thing thats wrong with me is this fluid on me"   Patient/Family Comments/goals: Return home    Pain/Comfort:  · Pain Rating 1: 0/10  · Pain Rating Post-Intervention 1: 0/10    Patients cultural, spiritual, Uatsdin conflicts given the current situation:  None stated     Objective:     Communicated with: RN prior to session.  Patient found all lines intact, call button in reach and RN notified and telemetry, oxygen upon OT entry to room.    General Precautions: Standard, fall   Orthopedic Precautions:N/A   Braces: N/A     Occupational Performance:    Bed Mobility:    · Patient completed Rolling/Turning to Left with  supervision  · Patient completed Supine to Sit with supervision     Functional Mobility/Transfers:  · Patient completed Sit <> Stand Transfer with supervion from EOB and rollator seat  · Functional Mobility: Pt completed functional mobility in room with supervision using rollator. Pt tolerated well with no LOB however minimal SOB. ( pt on 2L NC)    Activities of Daily Living:  · Grooming: supervision to brush teeth while seated on rollator seat in front of sink  · Upper Body Dressing: supervision to jill gown like jacket while seated EOB  · Lower Body Dressing: moderate assistance to jill B shoes while seated EOB ( pt reports EOB height was too high to assist with LB dressing task)    Cognitive/Visual Perceptual:  Cognitive/Psychosocial Skills:     -       Oriented to: Person, Place, Time and Situation   -       Follows Commands/attention:Follows multistep  commands  -       Communication: clear/fluent  -       Safety awareness/insight to disability: intact   -       Mood/Affect/Coping skills/emotional control: Appropriate to situation  Visual/Perceptual:      -Intact     Physical Exam:  Postural " "examination/scapula alignment:    -       Rounded shoulders  Skin integrity: Visible skin intact  Edema:  Mild BLE's  Dominant hand:    -       RUE  Upper Extremity Range of Motion:     -       Right Upper Extremity: WFL  -       Left Upper Extremity: WFL  Upper Extremity Strength:    -       Right Upper Extremity: WFL  -       Left Upper Extremity: WFL   Strength:    -       Right Upper Extremity: WFL  -       Left Upper Extremity: WFL    AMPAC 6 Click ADL:  AMPAC Total Score: 23    Treatment & Education:  -Pt edu on OT role/POC  -Importance of OOB activity with staff assistance ( UIC throughout the day/ with each meal)   -Safety during functional t/f and mobility ( RW management)  - White board updated  - Multiple self care tasks completed-- assistance level noted above  - All questions/concerns answered within OT scope of practice  Education:    Patient left EOB with all lines intact, call button in reach, RN notified and PCT present taking BP.    Assessment:     Nalini Varma is a 68 y.o. female with a medical diagnosis of Acute on chronic heart failure with normal ejection fraction. At this time, patient is functioning at their prior level of function and does not require further acute OT services. However, pt would benefit from HHOT following d/c to continue to progress towards goals and ensure safe transition to home environment.     Clinical Decision Makin.  OT Low:  "Pt evaluation falls under low complexity for evaluation coding due to performance deficits noted in 1-3 areas as stated above and 0 co-morbities affecting current functional status. Data obtained from problem focused assessments. No modifications or assistance was required for completion of evaluation. Only brief occupational profile and history review completed."     Plan:     During this hospitalization, patient does not require further acute OT services.  Please re-consult if situation changes.    · Plan of Care Reviewed with: " patient    This Plan of care has been discussed with the patient who was involved in its development and understands and is in agreement with the identified goals and treatment plan    GOALS:   Multidisciplinary Problems     Occupational Therapy Goals        Problem: Occupational Therapy Goal    Goal Priority Disciplines Outcome Interventions   Occupational Therapy Goal     OT, PT/OT Ongoing (interventions implemented as appropriate)    Description:  Pt is currently performing ADLs, functional mobility & t/fs at baseline and displays age-appropriate strength, endurance & balance. Acute OT services are not recommended at this time however would benefit from HHOT to ensure safe transition to home environment. Pt has all DME needs.                       Time Tracking:     OT Date of Treatment: 12/12/18  OT Start Time: 1136  OT Stop Time: 1152  OT Total Time (min): 16 min    Billable Minutes:Evaluation 16    Paula toussaint OT  12/12/2018

## 2018-12-12 NOTE — PROGRESS NOTES
[facility]  Cardiology  Progress Note    Patient Name: Nalini Varma  MRN: [id number]  Admission Date: 12/10/2018  Hospital Length of Stay: 1 days  Code Status: Full Code   Attending Physician: Arnold Gramajo MD   Primary Care Physician: Yane Sahni MD  Expected Discharge Date: 12/13/2018  Principal Problem:Acute on chronic heart failure with normal ejection fraction    Subjective:     Hospital Course:   No notes on file    Interval History: I/Os inconsistently tracked as patient has been ambulating to the bathroom without alerting nurse.     Review of Systems   Constitution: Negative for chills and fever.   HENT: Negative for congestion.    Cardiovascular: Positive for dyspnea on exertion, leg swelling and orthopnea. Negative for chest pain, irregular heartbeat, near-syncope, palpitations and syncope.   Respiratory: Positive for shortness of breath.    Gastrointestinal: Negative for abdominal pain, nausea and vomiting.   Neurological: Negative for dizziness, focal weakness, headaches and light-headedness.     Objective:     Vital Signs (Most Recent):  Temp: 97.9 °F (36.6 °C) (12/12/18 0809)  Pulse: 68 (12/12/18 0838)  Resp: 18 (12/12/18 0838)  BP: (!) 157/72 (12/12/18 0809)  SpO2: (!) 94 % (12/12/18 0809) Vital Signs (24h Range):  Temp:  [97.6 °F (36.4 °C)-98.5 °F (36.9 °C)] 97.9 °F (36.6 °C)  Pulse:  [68-84] 68  Resp:  [18-22] 18  SpO2:  [94 %-96 %] 94 %  BP: (126-157)/(59-72) 157/72     Weight: 118.8 kg (262 lb)  Body mass index is 41.04 kg/m².     SpO2: (!) 94 %  O2 Device (Oxygen Therapy): nasal cannula      Intake/Output Summary (Last 24 hours) at 12/12/2018 0958  Last data filed at 12/12/2018 0500  Gross per 24 hour   Intake 120 ml   Output 650 ml   Net -530 ml       Lines/Drains/Airways     Central Venous Catheter Line                 Percutaneous Central Line Insertion/Assessment - triple lumen  07/14/18 1200 right subclavian 150 days          Peripheral Intravenous Line                  Peripheral IV - Single Lumen 12/10/18 1727 Left Forearm 1 day                Physical Exam   Constitutional: She is oriented to person, place, and time. She appears well-developed and well-nourished. No distress.   obese   HENT:   Head: Normocephalic and atraumatic.   Nose: Nose normal.   Mouth/Throat: Oropharynx is clear and moist. No oropharyngeal exudate.   Periorbital edema inferior to left eye   Eyes: EOM are normal. Right eye exhibits no discharge. Left eye exhibits no discharge.   Neck: Neck supple. No JVD present. No tracheal deviation present.   Cardiovascular: Normal rate, regular rhythm, normal heart sounds and intact distal pulses.   1+ pitting edema   Pulmonary/Chest: She has no wheezes. She has rales (bibasilar).   Abdominal: Soft. Bowel sounds are normal. She exhibits distension. There is no tenderness. There is no rebound and no guarding.   Neurological: She is alert and oriented to person, place, and time.   Skin: Skin is warm and dry. She is not diaphoretic.   Vitals reviewed.      Significant Labs:   BMP:   Recent Labs   Lab 12/10/18  1727 12/11/18  0553 12/12/18  0412   GLU 97 93 95   * 133* 135*   K 3.2* 3.9 3.6   CL 89* 92* 94*   CO2 29 31* 29   BUN 16 15 17   CREATININE 0.9 0.9 1.1   CALCIUM 9.3 8.5* 8.4*   MG  --  1.1* 1.4*    and CBC   Recent Labs   Lab 12/10/18  1727 12/12/18  0412   WBC 11.91 8.41   HGB 10.5* 9.1*   HCT 32.9* 29.1*    254       Significant Imaging:      Assessment and Plan:       * Acute on chronic heart failure with normal ejection fraction    67 y/o F w/ HFpEF presented from clinic with ADHF. Patient hypertensive and has had electrolyte imbalances with lisinopril and HCTZ in the past (hyperkalemia, hyponatremia, respectively). BNP 200s but not accurate in patients with obesity. Normal EF on echo with no wall motion abnormalities. CVP 3. Patient appeared volume overloaded on admission (+JVD, rales, pitting edema of LE's and weight gain). Weight from  119>114 kgs; patient still experiencing significant orthopnea.     Plan:  - Continue diuresis, recommended furosemide 80 mg IV TID. Assessed with bladder scan to assure no urinary retention; no urinary retention reported by nursing. Place purewick for more accurate Os.  - continue carvedilol 6.25 mg PO BID for HTN  - recommend adding low dose valsartan for further bp control  - daily standing weight  - strict I/Os  - 2L/day fluid restriction  - 2 g Na restriction  - maintain K>4, Mg >2         VTE Risk Mitigation (From admission, onward)        Ordered     enoxaparin injection 40 mg  Daily      12/10/18 2005     IP VTE HIGH RISK PATIENT  Once      12/10/18 2005     Place sequential compression device  Until discontinued      12/10/18 2002          Alexy Reynolds MD   PGY-1  [phone] (pager)  12/12/2018 12:48 PM  Cardiology  [facility]

## 2018-12-12 NOTE — ASSESSMENT & PLAN NOTE
69 y/o F w/ HFpEF presented from clinic with ADHF. Patient hypertensive and has had electrolyte imbalances with lisinopril and HCTZ in the past (hyperkalemia, hyponatremia, respectively). BNP 200s but not accurate in patients with obesity. Normal EF on echo with no wall motion abnormalities. CVP 3. Patient appeared volume overloaded on admission (+JVD, rales, pitting edema of LE's and weight gain). Weight from 119>114 kgs; patient still experiencing significant orthopnea.     Plan:  - Continue diuresis, recommended furosemide 80 mg IV TID. Assessed with bladder scan to assure no urinary retention; no urinary retention reported by nursing. Place [facility] for more accurate Os.  - continue carvedilol 6.25 mg PO BID for HTN  - recommend adding low dose valsartan for further bp control  - daily standing weight  - strict I/Os  - 2L/day fluid restriction  - 2 g Na restriction  - maintain K>4, Mg >2

## 2018-12-13 LAB
ALBUMIN SERPL BCP-MCNC: 2.6 G/DL
ALP SERPL-CCNC: 72 U/L
ALT SERPL W/O P-5'-P-CCNC: 8 U/L
ANION GAP SERPL CALC-SCNC: 13 MMOL/L
AST SERPL-CCNC: 10 U/L
BASOPHILS # BLD AUTO: 0.02 K/UL
BASOPHILS NFR BLD: 0.3 %
BILIRUB SERPL-MCNC: 0.4 MG/DL
BUN SERPL-MCNC: 18 MG/DL
CALCIUM SERPL-MCNC: 8.3 MG/DL
CHLORIDE SERPL-SCNC: 95 MMOL/L
CO2 SERPL-SCNC: 29 MMOL/L
CREAT SERPL-MCNC: 0.8 MG/DL
DIFFERENTIAL METHOD: ABNORMAL
EOSINOPHIL # BLD AUTO: 0.2 K/UL
EOSINOPHIL NFR BLD: 2.2 %
ERYTHROCYTE [DISTWIDTH] IN BLOOD BY AUTOMATED COUNT: 16.3 %
EST. GFR  (AFRICAN AMERICAN): >60 ML/MIN/1.73 M^2
EST. GFR  (NON AFRICAN AMERICAN): >60 ML/MIN/1.73 M^2
GLUCOSE SERPL-MCNC: 92 MG/DL
HCT VFR BLD AUTO: 29.4 %
HGB BLD-MCNC: 9.1 G/DL
IMM GRANULOCYTES # BLD AUTO: 0.02 K/UL
IMM GRANULOCYTES NFR BLD AUTO: 0.3 %
LYMPHOCYTES # BLD AUTO: 1.4 K/UL
LYMPHOCYTES NFR BLD: 19.9 %
MAGNESIUM SERPL-MCNC: 1.2 MG/DL
MCH RBC QN AUTO: 28 PG
MCHC RBC AUTO-ENTMCNC: 31 G/DL
MCV RBC AUTO: 91 FL
MONOCYTES # BLD AUTO: 0.8 K/UL
MONOCYTES NFR BLD: 11.1 %
NEUTROPHILS # BLD AUTO: 4.5 K/UL
NEUTROPHILS NFR BLD: 66.2 %
NRBC BLD-RTO: 0 /100 WBC
PHOSPHATE SERPL-MCNC: 3.7 MG/DL
PLATELET # BLD AUTO: 263 K/UL
PMV BLD AUTO: 10.4 FL
POTASSIUM SERPL-SCNC: 3.2 MMOL/L
PROT SERPL-MCNC: 5.7 G/DL
RBC # BLD AUTO: 3.25 M/UL
SODIUM SERPL-SCNC: 137 MMOL/L
WBC # BLD AUTO: 6.84 K/UL

## 2018-12-13 PROCEDURE — 94640 AIRWAY INHALATION TREATMENT: CPT

## 2018-12-13 PROCEDURE — 99233 SBSQ HOSP IP/OBS HIGH 50: CPT | Mod: ,,, | Performed by: HOSPITALIST

## 2018-12-13 PROCEDURE — 25000003 PHARM REV CODE 250: Performed by: HOSPITALIST

## 2018-12-13 PROCEDURE — 11000001 HC ACUTE MED/SURG PRIVATE ROOM

## 2018-12-13 PROCEDURE — 80053 COMPREHEN METABOLIC PANEL: CPT

## 2018-12-13 PROCEDURE — 25000242 PHARM REV CODE 250 ALT 637 W/ HCPCS: Performed by: INTERNAL MEDICINE

## 2018-12-13 PROCEDURE — 36415 COLL VENOUS BLD VENIPUNCTURE: CPT

## 2018-12-13 PROCEDURE — 84100 ASSAY OF PHOSPHORUS: CPT

## 2018-12-13 PROCEDURE — 63600175 PHARM REV CODE 636 W HCPCS: Performed by: INTERNAL MEDICINE

## 2018-12-13 PROCEDURE — 63600175 PHARM REV CODE 636 W HCPCS: Performed by: HOSPITALIST

## 2018-12-13 PROCEDURE — 25000242 PHARM REV CODE 250 ALT 637 W/ HCPCS: Performed by: HOSPITALIST

## 2018-12-13 PROCEDURE — 83735 ASSAY OF MAGNESIUM: CPT

## 2018-12-13 PROCEDURE — 85025 COMPLETE CBC W/AUTO DIFF WBC: CPT

## 2018-12-13 PROCEDURE — 25000003 PHARM REV CODE 250: Performed by: INTERNAL MEDICINE

## 2018-12-13 RX ORDER — IPRATROPIUM BROMIDE AND ALBUTEROL SULFATE 2.5; .5 MG/3ML; MG/3ML
3 SOLUTION RESPIRATORY (INHALATION) EVERY 6 HOURS
Status: DISCONTINUED | OUTPATIENT
Start: 2018-12-13 | End: 2018-12-15 | Stop reason: HOSPADM

## 2018-12-13 RX ORDER — MAGNESIUM SULFATE HEPTAHYDRATE 40 MG/ML
2 INJECTION, SOLUTION INTRAVENOUS ONCE
Status: COMPLETED | OUTPATIENT
Start: 2018-12-13 | End: 2018-12-13

## 2018-12-13 RX ORDER — IPRATROPIUM BROMIDE AND ALBUTEROL SULFATE 2.5; .5 MG/3ML; MG/3ML
3 SOLUTION RESPIRATORY (INHALATION) EVERY 4 HOURS PRN
Status: DISCONTINUED | OUTPATIENT
Start: 2018-12-13 | End: 2018-12-13

## 2018-12-13 RX ADMIN — FLUTICASONE FUROATE AND VILANTEROL TRIFENATATE 1 PUFF: 100; 25 POWDER RESPIRATORY (INHALATION) at 08:12

## 2018-12-13 RX ADMIN — ENOXAPARIN SODIUM 40 MG: 100 INJECTION SUBCUTANEOUS at 04:12

## 2018-12-13 RX ADMIN — ATORVASTATIN CALCIUM 40 MG: 20 TABLET, FILM COATED ORAL at 08:12

## 2018-12-13 RX ADMIN — HYDROCODONE BITARTRATE AND ACETAMINOPHEN 1 TABLET: 10; 325 TABLET ORAL at 12:12

## 2018-12-13 RX ADMIN — LISINOPRIL 2.5 MG: 2.5 TABLET ORAL at 08:12

## 2018-12-13 RX ADMIN — AMLODIPINE BESYLATE 10 MG: 10 TABLET ORAL at 08:12

## 2018-12-13 RX ADMIN — IPRATROPIUM BROMIDE AND ALBUTEROL SULFATE 3 ML: .5; 3 SOLUTION RESPIRATORY (INHALATION) at 07:12

## 2018-12-13 RX ADMIN — POTASSIUM BICARBONATE 50 MEQ: 25 TABLET, EFFERVESCENT ORAL at 12:12

## 2018-12-13 RX ADMIN — CETIRIZINE HYDROCHLORIDE 10 MG: 10 TABLET, FILM COATED ORAL at 08:12

## 2018-12-13 RX ADMIN — CARVEDILOL 6.25 MG: 6.25 TABLET, FILM COATED ORAL at 08:12

## 2018-12-13 RX ADMIN — FUROSEMIDE 80 MG: 10 INJECTION, SOLUTION INTRAMUSCULAR; INTRAVENOUS at 09:12

## 2018-12-13 RX ADMIN — ALBUTEROL SULFATE 1 PUFF: 90 AEROSOL, METERED RESPIRATORY (INHALATION) at 08:12

## 2018-12-13 RX ADMIN — CLONIDINE HYDROCHLORIDE 0.1 MG: 0.1 TABLET ORAL at 08:12

## 2018-12-13 RX ADMIN — FUROSEMIDE 80 MG: 10 INJECTION, SOLUTION INTRAMUSCULAR; INTRAVENOUS at 04:12

## 2018-12-13 RX ADMIN — FLUTICASONE PROPIONATE 50 MCG: 50 SPRAY, METERED NASAL at 08:12

## 2018-12-13 RX ADMIN — CLONIDINE HYDROCHLORIDE 0.1 MG: 0.1 TABLET ORAL at 09:12

## 2018-12-13 RX ADMIN — GUAIFENESIN 1200 MG: 600 TABLET, EXTENDED RELEASE ORAL at 09:12

## 2018-12-13 RX ADMIN — ASPIRIN 81 MG: 81 TABLET, COATED ORAL at 08:12

## 2018-12-13 RX ADMIN — MAGNESIUM SULFATE IN WATER 2 G: 40 INJECTION, SOLUTION INTRAVENOUS at 12:12

## 2018-12-13 RX ADMIN — CARVEDILOL 6.25 MG: 6.25 TABLET, FILM COATED ORAL at 09:12

## 2018-12-13 RX ADMIN — FUROSEMIDE 80 MG: 10 INJECTION, SOLUTION INTRAMUSCULAR; INTRAVENOUS at 08:12

## 2018-12-13 NOTE — ASSESSMENT & PLAN NOTE
67 y/o F w/ HFpEF presented from clinic with ADHF. Patient hypertensive and has had electrolyte imbalances with lisinopril and HCTZ in the past (hyperkalemia, hyponatremia, respectively). BNP 200s but not accurate in patients with obesity. Normal EF on echo with no wall motion abnormalities. CVP 3. Patient appeared volume overloaded on admission (+JVD, rales, pitting edema of LE's and weight gain). Weight from 119>114 kgs; patient still experiencing significant orthopnea.     Plan:  - Continue diuresis, recommended furosemide 80 mg IV TID. Purewick unsuccessful. Bedside commode in place.   - continue carvedilol 6.25 mg PO BID for HTN  - recommend adding low dose valsartan for further bp control, would d/c lisinopril due to prior electrolyte imbalances  - daily standing weight  - strict I/Os  - 2L/day fluid restriction  - 2 g Na restriction  - maintain K>4, Mg >2  -rec duonebs prn for wheezing

## 2018-12-13 NOTE — PROGRESS NOTES
Hospital Medicine  Progress note    Team: [facility] HOSP MED R Arnold Gramajo MD  Admit Date: 12/10/2018  FELICIANO 12/13/2018  Code status: Full Code    Principal Problem:  Acute on chronic heart failure with normal ejection fraction    Interval hx: Patient seen and examined at bedside, no acute events overnight. Still on IV lasix, as per cardiology. Lost 10 pounds since admission. Anticipate discharge tomorrow.     ROS     Constitutional: Positive for fatigue. Negative for chills and fever.   Eyes: Negative for pain and itching.   Respiratory: Positive for cough and shortness of breath. Negative for chest tightness.    Cardiovascular: Positive for leg swelling. Negative for chest pain.   Gastrointestinal: Positive for abdominal distention. Negative for abdominal pain, diarrhea, nausea and vomiting.   Endocrine: Negative for polydipsia and polyuria.   Genitourinary: Negative for dysuria and frequency.   Musculoskeletal: Negative for back pain and myalgias.   Neurological: Negative for light-headedness and headaches.   Psychiatric/Behavioral: Negative for confusion. The patient is not nervous/anxious.            PEx  Temp:  [97.5 °F (36.4 °C)-98.8 °F (37.1 °C)]   Pulse:  [61-85]   Resp:  [16-20]   BP: (131-195)/(61-88)   SpO2:  [93 %-96 %]     Intake/Output Summary (Last 24 hours) at 12/13/2018 1255  Last data filed at 12/13/2018 0541  Gross per 24 hour   Intake 1000 ml   Output 1850 ml   Net -850 ml       Constitutional: She is oriented to person, place, and time. No distress.   HENT:   Head: Normocephalic and atraumatic.   Eyes: Right eye exhibits no discharge. Left eye exhibits no discharge.   Neck: JVD present.   Cardiovascular: Normal rate. Exam reveals no gallop and no friction rub.   Pulmonary/Chest: Effort normal. No stridor. She has no wheezes. She has rales.   Abdominal: Soft. Bowel sounds are normal. There is no tenderness. There is no guarding.   Musculoskeletal: She exhibits edema.   Neurological: She is alert  and oriented to person, place, and time.   Skin: Skin is warm and dry. She is not diaphoretic.   Psychiatric: She has a normal mood and affect. Her behavior is normal.         Recent Labs   Lab 12/10/18  1727 12/12/18 0412 12/13/18  0553   WBC 11.91 8.41 6.84   HGB 10.5* 9.1* 9.1*   HCT 32.9* 29.1* 29.4*    254 263     Recent Labs   Lab 12/11/18  0553 12/12/18 0412 12/13/18  0553   * 135* 137   K 3.9 3.6 3.2*   CL 92* 94* 95   CO2 31* 29 29   BUN 15 17 18   CREATININE 0.9 1.1 0.8   GLU 93 95 92   CALCIUM 8.5* 8.4* 8.3*   MG 1.1* 1.4* 1.2*   PHOS 3.4 3.8 3.7     Recent Labs   Lab 12/10/18  1727 12/11/18  0553 12/12/18 0412 12/13/18  0553   ALKPHOS 96 78 74 72   ALT 13 10 8* 8*   AST 14 11 10 10   ALBUMIN 3.4* 2.7* 2.8* 2.6*   PROT 7.3 5.9* 5.9* 5.7*   BILITOT 0.4 0.4 0.3 0.4   INR 0.9  --   --   --       No results for input(s): POCTGLUCOSE in the last 168 hours.  Recent Labs     12/10/18  1727   TROPONINI 0.013       Scheduled Meds:   albuterol-ipratropium  3 mL Nebulization Q6H    amLODIPine  10 mg Oral Daily    aspirin  81 mg Oral Daily    atorvastatin  40 mg Oral Daily    carvedilol  6.25 mg Oral BID    cetirizine  10 mg Oral Daily    cloNIDine  0.1 mg Oral Q12H    enoxaparin  40 mg Subcutaneous Daily    fluticasone-vilanterol  1 puff Inhalation Daily    furosemide  80 mg Intravenous TID    ibuprofen  200 mg Oral Once    lisinopril  2.5 mg Oral Daily    magnesium sulfate IVPB  2 g Intravenous Once     Continuous Infusions:  As Needed:  acetaminophen, fluticasone, guaiFENesin, HYDROcodone-acetaminophen, sodium chloride 0.9%    Active Hospital Problems    Diagnosis  POA    *Acute on chronic heart failure with normal ejection fraction [I50.33]  Yes    CHF (congestive heart failure) [I50.9]  Yes    Edema [R60.9]  Yes    Hypomagnesemia [E83.42]  Yes    Chronic obstructive pulmonary disease [J44.9]  Yes    Hypertension, benign [I10]  Yes      Resolved Hospital Problems   No resolved  problems to display.       Overview      Assessment and Plan for Problems addressed today:    Acute on chronic heart failure with normal ejection fraction     Pt with history of HFPEF   reports compliance with medication however the diet is not very clear  received a dose of 80 in ED and we will continue w IV diuresis, FU BMP and replace K as needed  pure wick catheter ordered for Intake and output  Cardiology consulted per request of card clinic  adding low dose valsartan  2 D echo, normal EF, indeterminate diastolic function  Daily weights  Intake and output   Edema     Refer to CHF         Hypertension, benign        There has been multiple recent changes due to electrolyte abnormalities  Continuing clonidine and amlodipine for now  Holding BB in setting of HF exacerbation, most likely can resume tmw as pt becoming more euvolemic    prior anti HTN meds: (Stopped lisinopril - secondary to hyperkalemia   Stopped hctz due to hyponatremia), will add low dose valsartan, continue clonidine          Chronic obstructive pulmonary disease     Is using more oxygen than baseline however likely due to volume overload  Not concerned for acute exacerbation of COPD  Continue home dose medication/ inhalers  Duonebs ARTC q 6hrs       Diet: Low sodium  GI PPx:   DVT PPx:    VTE Risk Mitigation (From admission, onward)        Ordered     enoxaparin injection 40 mg  Daily      12/10/18 2005     IP VTE HIGH RISK PATIENT  Once      12/10/18 2005     Place sequential compression device  Until discontinued      12/10/18 2002        Goals of Care: Full code     Discharge plan and follow up: pending diuresis and negative I/O, cardiology recs    Provider    Arnold Robert MD  Staff Hospitalist  Department of Hospital Medicine  [facility]   [phone]

## 2018-12-13 NOTE — PLAN OF CARE
Problem: Adult Inpatient Plan of Care  Goal: Plan of Care Review  Outcome: Ongoing (interventions implemented as appropriate)  Pt AAOx4, still SOB on 2L oxygen. Pt has a lot of pain on her left arm and left knee everytime she gets up to use a commode, but she manage to get up and down the bed on her own to use the commode. Free of fall. No other acute distress reported at this time. Report given to Jeison BARRON

## 2018-12-13 NOTE — PLAN OF CARE
Problem: Adult Inpatient Plan of Care  Goal: Plan of Care Review  Pt in bed resting a/o x4 follows commands, VSS as charted per f/s. Tele monitor in place. PIV c/d/i flushed/capped. Voided unmeasured amount earlier in shift, discussed with cardiology, weight obtained. Opted for purewick ext cath, in place to suction at this time. Remains on lasix, edema 2+ bilat. Pt only c/o pain when ambulatory located to L knee area, states due to prior surgery, with plans to possibly have another. PT came by earlier for eval. Pt is on 2L NC, is O2 dependent at home as well, uses a walker for assist. Attempted education with pt, pt continuously requires reinforcement and attempts to argue education throughout shift. LS CTA and unlabored with no distress noted. Report given to night shift nurse.

## 2018-12-13 NOTE — PROGRESS NOTES
[facility]  Cardiology  Progress Note    Patient Name: Nalini Varma  MRN: [id number]  Admission Date: 12/10/2018  Hospital Length of Stay: 2 days  Code Status: Full Code   Attending Physician: Arnold Gramajo MD   Primary Care Physician: Yane Sahni MD  Expected Discharge Date: 12/13/2018  Principal Problem:Acute on chronic heart failure with normal ejection fraction    Subjective:     Hospital Course:   No notes on file    Interval History: NAEON. Patient has no acute complaints. I/O likely inaccurate based on patient noncompliance. Denies any improvement in symptoms; however, making urine.       Review of Systems   Constitution: Negative for chills and fever.   HENT: Negative for congestion.    Cardiovascular: Positive for dyspnea on exertion, leg swelling and orthopnea. Negative for chest pain, irregular heartbeat, near-syncope, palpitations and syncope.   Respiratory: Positive for shortness of breath.    Gastrointestinal: Negative for abdominal pain, nausea and vomiting.   Neurological: Negative for dizziness, focal weakness, headaches and light-headedness.     Objective:     Vital Signs (Most Recent):  Temp: 97.7 °F (36.5 °C) (12/13/18 0743)  Pulse: 72 (12/13/18 0859)  Resp: 20 (12/13/18 0859)  BP: 139/65 (12/13/18 0743)  SpO2: 95 % (12/13/18 0900) Vital Signs (24h Range):  Temp:  [97.7 °F (36.5 °C)-98.8 °F (37.1 °C)] 97.7 °F (36.5 °C)  Pulse:  [66-85] 72  Resp:  [16-20] 20  SpO2:  [93 %-95 %] 95 %  BP: (136-195)/(62-88) 139/65     Weight: 114.4 kg (252 lb 1.5 oz)  Body mass index is 39.48 kg/m².     SpO2: 95 %  O2 Device (Oxygen Therapy): nasal cannula      Intake/Output Summary (Last 24 hours) at 12/13/2018 1014  Last data filed at 12/13/2018 0541  Gross per 24 hour   Intake 1000 ml   Output 1850 ml   Net -850 ml       Lines/Drains/Airways     Central Venous Catheter Line                 Percutaneous Central Line Insertion/Assessment - triple lumen  07/14/18 1200 right subclavian  151 days          Peripheral Intravenous Line                 Peripheral IV - Single Lumen 12/12/18 2130 Posterior;Right Forearm less than 1 day                Physical Exam   Constitutional: She is oriented to person, place, and time. She appears well-developed and well-nourished. No distress.   obese   HENT:   Head: Normocephalic and atraumatic.   Nose: Nose normal.   Periorbital edema inferior to left eye   Eyes: EOM are normal. Right eye exhibits no discharge. Left eye exhibits no discharge.   Neck: Neck supple. JVD: unable to be assessed due to neck subcutaneous tissue. No tracheal deviation present.   Cardiovascular: Normal rate, regular rhythm, normal heart sounds and intact distal pulses.   1+ pitting edema- decreased from day prior. Wrinkling of lower extremity skin appreciable.    Pulmonary/Chest: She has wheezes. She has no rales.   Abdominal: Soft. Bowel sounds are normal. She exhibits distension. There is no tenderness. There is no rebound and no guarding.   Sacral pitting edema   Neurological: She is alert and oriented to person, place, and time.   Skin: Skin is warm and dry. She is not diaphoretic.   Psychiatric: Her speech is normal.   Vitals reviewed.      Significant Labs:   BMP:   Recent Labs   Lab 12/12/18  0412 12/13/18  0553   GLU 95 92   * 137   K 3.6 3.2*   CL 94* 95   CO2 29 29   BUN 17 18   CREATININE 1.1 0.8   CALCIUM 8.4* 8.3*   MG 1.4* 1.2*    and CBC   Recent Labs   Lab 12/12/18  0412 12/13/18  0553   WBC 8.41 6.84   HGB 9.1* 9.1*   HCT 29.1* 29.4*    263       Significant Imaging:    Assessment and Plan:       * Acute on chronic heart failure with normal ejection fraction    67 y/o F w/ HFpEF presented from clinic with ADHF. Patient hypertensive and has had electrolyte imbalances with lisinopril and HCTZ in the past (hyperkalemia, hyponatremia, respectively). BNP 200s but not accurate in patients with obesity. Normal EF on echo with no wall motion abnormalities. CVP 3.  Patient appeared volume overloaded on admission (+JVD, rales, pitting edema of LE's and weight gain). Weight from 119>114 kgs; patient still experiencing significant orthopnea.     Plan:  - Continue diuresis, recommended furosemide 80 mg IV TID. Purewick unsuccessful. Bedside commode in place.   - continue carvedilol 6.25 mg PO BID for HTN  - recommend adding low dose valsartan for further bp control, would d/c lisinopril due to prior electrolyte imbalances  - daily standing weight  - strict I/Os  - 2L/day fluid restriction  - 2 g Na restriction  - maintain K>4, Mg >2  -rec duonebs prn for wheezing       We will continue to follow.    VTE Risk Mitigation (From admission, onward)        Ordered     enoxaparin injection 40 mg  Daily      12/10/18 2005     IP VTE HIGH RISK PATIENT  Once      12/10/18 2005     Place sequential compression device  Until discontinued      12/10/18 2002          Alexy Reynolds MD   PGY-1  [phone] (pager)  12/13/2018 10:20 AM  Cardiology  [facility]

## 2018-12-13 NOTE — SUBJECTIVE & OBJECTIVE
Interval History: NAEON. Patient has no acute complaints. I/O likely inaccurate based on patient noncompliance. Denies any improvement in symptoms; however, making urine.       Review of Systems   Constitution: Negative for chills and fever.   HENT: Negative for congestion.    Cardiovascular: Positive for dyspnea on exertion, leg swelling and orthopnea. Negative for chest pain, irregular heartbeat, near-syncope, palpitations and syncope.   Respiratory: Positive for shortness of breath.    Gastrointestinal: Negative for abdominal pain, nausea and vomiting.   Neurological: Negative for dizziness, focal weakness, headaches and light-headedness.     Objective:     Vital Signs (Most Recent):  Temp: 97.7 °F (36.5 °C) (12/13/18 0743)  Pulse: 72 (12/13/18 0859)  Resp: 20 (12/13/18 0859)  BP: 139/65 (12/13/18 0743)  SpO2: 95 % (12/13/18 0900) Vital Signs (24h Range):  Temp:  [97.7 °F (36.5 °C)-98.8 °F (37.1 °C)] 97.7 °F (36.5 °C)  Pulse:  [66-85] 72  Resp:  [16-20] 20  SpO2:  [93 %-95 %] 95 %  BP: (136-195)/(62-88) 139/65     Weight: 114.4 kg (252 lb 1.5 oz)  Body mass index is 39.48 kg/m².     SpO2: 95 %  O2 Device (Oxygen Therapy): nasal cannula      Intake/Output Summary (Last 24 hours) at 12/13/2018 1014  Last data filed at 12/13/2018 0541  Gross per 24 hour   Intake 1000 ml   Output 1850 ml   Net -850 ml       Lines/Drains/Airways     Central Venous Catheter Line                 Percutaneous Central Line Insertion/Assessment - triple lumen  07/14/18 1200 right subclavian 151 days          Peripheral Intravenous Line                 Peripheral IV - Single Lumen 12/12/18 2130 Posterior;Right Forearm less than 1 day                Physical Exam   Constitutional: She is oriented to person, place, and time. She appears well-developed and well-nourished. No distress.   obese   HENT:   Head: Normocephalic and atraumatic.   Nose: Nose normal.   Periorbital edema inferior to left eye   Eyes: EOM are normal. Right eye exhibits no  discharge. Left eye exhibits no discharge.   Neck: Neck supple. JVD: unable to be assessed due to neck subcutaneous tissue. No tracheal deviation present.   Cardiovascular: Normal rate, regular rhythm, normal heart sounds and intact distal pulses.   1+ pitting edema- decreased from day prior. Wrinkling of lower extremity skin appreciable.    Pulmonary/Chest: She has wheezes. She has no rales.   Abdominal: Soft. Bowel sounds are normal. She exhibits distension. There is no tenderness. There is no rebound and no guarding.   Sacral pitting edema   Neurological: She is alert and oriented to person, place, and time.   Skin: Skin is warm and dry. She is not diaphoretic.   Psychiatric: Her speech is normal.   Vitals reviewed.      Significant Labs:   BMP:   Recent Labs   Lab 12/12/18  0412 12/13/18  0553   GLU 95 92   * 137   K 3.6 3.2*   CL 94* 95   CO2 29 29   BUN 17 18   CREATININE 1.1 0.8   CALCIUM 8.4* 8.3*   MG 1.4* 1.2*    and CBC   Recent Labs   Lab 12/12/18  0412 12/13/18  0553   WBC 8.41 6.84   HGB 9.1* 9.1*   HCT 29.1* 29.4*    263       Significant Imaging:

## 2018-12-14 LAB
ALBUMIN SERPL BCP-MCNC: 2.8 G/DL
ALP SERPL-CCNC: 83 U/L
ALT SERPL W/O P-5'-P-CCNC: 8 U/L
ANION GAP SERPL CALC-SCNC: 9 MMOL/L
AST SERPL-CCNC: 10 U/L
BASOPHILS # BLD AUTO: 0.02 K/UL
BASOPHILS NFR BLD: 0.2 %
BILIRUB SERPL-MCNC: 0.3 MG/DL
BUN SERPL-MCNC: 23 MG/DL
CALCIUM SERPL-MCNC: 8.4 MG/DL
CHLORIDE SERPL-SCNC: 94 MMOL/L
CO2 SERPL-SCNC: 33 MMOL/L
CREAT SERPL-MCNC: 0.9 MG/DL
DIFFERENTIAL METHOD: ABNORMAL
EOSINOPHIL # BLD AUTO: 0.2 K/UL
EOSINOPHIL NFR BLD: 2.2 %
ERYTHROCYTE [DISTWIDTH] IN BLOOD BY AUTOMATED COUNT: 16 %
EST. GFR  (AFRICAN AMERICAN): >60 ML/MIN/1.73 M^2
EST. GFR  (NON AFRICAN AMERICAN): >60 ML/MIN/1.73 M^2
GLUCOSE SERPL-MCNC: 96 MG/DL
HCT VFR BLD AUTO: 29.8 %
HGB BLD-MCNC: 9.1 G/DL
IMM GRANULOCYTES # BLD AUTO: 0.04 K/UL
IMM GRANULOCYTES NFR BLD AUTO: 0.5 %
LYMPHOCYTES # BLD AUTO: 1.5 K/UL
LYMPHOCYTES NFR BLD: 18.4 %
MAGNESIUM SERPL-MCNC: 1.5 MG/DL
MCH RBC QN AUTO: 27.4 PG
MCHC RBC AUTO-ENTMCNC: 30.5 G/DL
MCV RBC AUTO: 90 FL
MONOCYTES # BLD AUTO: 1 K/UL
MONOCYTES NFR BLD: 11.9 %
NEUTROPHILS # BLD AUTO: 5.4 K/UL
NEUTROPHILS NFR BLD: 66.8 %
NRBC BLD-RTO: 0 /100 WBC
PHOSPHATE SERPL-MCNC: 4.1 MG/DL
PLATELET # BLD AUTO: 254 K/UL
PMV BLD AUTO: 10 FL
POTASSIUM SERPL-SCNC: 3.6 MMOL/L
PROT SERPL-MCNC: 6.3 G/DL
RBC # BLD AUTO: 3.32 M/UL
SODIUM SERPL-SCNC: 136 MMOL/L
WBC # BLD AUTO: 8.01 K/UL

## 2018-12-14 PROCEDURE — 63600175 PHARM REV CODE 636 W HCPCS: Performed by: INTERNAL MEDICINE

## 2018-12-14 PROCEDURE — 85025 COMPLETE CBC W/AUTO DIFF WBC: CPT

## 2018-12-14 PROCEDURE — 80053 COMPREHEN METABOLIC PANEL: CPT

## 2018-12-14 PROCEDURE — 25000242 PHARM REV CODE 250 ALT 637 W/ HCPCS: Performed by: HOSPITALIST

## 2018-12-14 PROCEDURE — 27000221 HC OXYGEN, UP TO 24 HOURS

## 2018-12-14 PROCEDURE — 94640 AIRWAY INHALATION TREATMENT: CPT

## 2018-12-14 PROCEDURE — 99233 SBSQ HOSP IP/OBS HIGH 50: CPT | Mod: ,,, | Performed by: INTERNAL MEDICINE

## 2018-12-14 PROCEDURE — 36415 COLL VENOUS BLD VENIPUNCTURE: CPT

## 2018-12-14 PROCEDURE — 84100 ASSAY OF PHOSPHORUS: CPT

## 2018-12-14 PROCEDURE — 25000003 PHARM REV CODE 250: Performed by: INTERNAL MEDICINE

## 2018-12-14 PROCEDURE — 94761 N-INVAS EAR/PLS OXIMETRY MLT: CPT

## 2018-12-14 PROCEDURE — 83735 ASSAY OF MAGNESIUM: CPT

## 2018-12-14 PROCEDURE — 63600175 PHARM REV CODE 636 W HCPCS: Performed by: HOSPITALIST

## 2018-12-14 PROCEDURE — 25000003 PHARM REV CODE 250: Performed by: HOSPITALIST

## 2018-12-14 PROCEDURE — 99233 SBSQ HOSP IP/OBS HIGH 50: CPT | Mod: ,,, | Performed by: HOSPITALIST

## 2018-12-14 PROCEDURE — 11000001 HC ACUTE MED/SURG PRIVATE ROOM

## 2018-12-14 RX ORDER — TORSEMIDE 20 MG/1
40 TABLET ORAL 2 TIMES DAILY
Status: DISCONTINUED | OUTPATIENT
Start: 2018-12-14 | End: 2018-12-15 | Stop reason: HOSPADM

## 2018-12-14 RX ORDER — MAGNESIUM SULFATE HEPTAHYDRATE 40 MG/ML
2 INJECTION, SOLUTION INTRAVENOUS ONCE
Status: COMPLETED | OUTPATIENT
Start: 2018-12-14 | End: 2018-12-14

## 2018-12-14 RX ADMIN — CLONIDINE HYDROCHLORIDE 0.1 MG: 0.1 TABLET ORAL at 08:12

## 2018-12-14 RX ADMIN — HYDROCODONE BITARTRATE AND ACETAMINOPHEN 1 TABLET: 10; 325 TABLET ORAL at 12:12

## 2018-12-14 RX ADMIN — CARVEDILOL 6.25 MG: 6.25 TABLET, FILM COATED ORAL at 08:12

## 2018-12-14 RX ADMIN — IPRATROPIUM BROMIDE AND ALBUTEROL SULFATE 3 ML: .5; 3 SOLUTION RESPIRATORY (INHALATION) at 01:12

## 2018-12-14 RX ADMIN — CETIRIZINE HYDROCHLORIDE 10 MG: 10 TABLET, FILM COATED ORAL at 08:12

## 2018-12-14 RX ADMIN — FLUTICASONE FUROATE AND VILANTEROL TRIFENATATE 1 PUFF: 100; 25 POWDER RESPIRATORY (INHALATION) at 08:12

## 2018-12-14 RX ADMIN — ENOXAPARIN SODIUM 40 MG: 100 INJECTION SUBCUTANEOUS at 05:12

## 2018-12-14 RX ADMIN — IPRATROPIUM BROMIDE AND ALBUTEROL SULFATE 3 ML: .5; 3 SOLUTION RESPIRATORY (INHALATION) at 09:12

## 2018-12-14 RX ADMIN — POTASSIUM BICARBONATE 50 MEQ: 25 TABLET, EFFERVESCENT ORAL at 11:12

## 2018-12-14 RX ADMIN — TORSEMIDE 40 MG: 20 TABLET ORAL at 08:12

## 2018-12-14 RX ADMIN — IPRATROPIUM BROMIDE AND ALBUTEROL SULFATE 3 ML: .5; 3 SOLUTION RESPIRATORY (INHALATION) at 07:12

## 2018-12-14 RX ADMIN — GUAIFENESIN 1200 MG: 600 TABLET, EXTENDED RELEASE ORAL at 08:12

## 2018-12-14 RX ADMIN — FUROSEMIDE 80 MG: 10 INJECTION, SOLUTION INTRAMUSCULAR; INTRAVENOUS at 08:12

## 2018-12-14 RX ADMIN — ASPIRIN 81 MG: 81 TABLET, COATED ORAL at 08:12

## 2018-12-14 RX ADMIN — MAGNESIUM SULFATE IN WATER 2 G: 40 INJECTION, SOLUTION INTRAVENOUS at 11:12

## 2018-12-14 RX ADMIN — HYDROCODONE BITARTRATE AND ACETAMINOPHEN 1 TABLET: 10; 325 TABLET ORAL at 05:12

## 2018-12-14 RX ADMIN — AMLODIPINE BESYLATE 10 MG: 10 TABLET ORAL at 08:12

## 2018-12-14 RX ADMIN — LISINOPRIL 2.5 MG: 2.5 TABLET ORAL at 08:12

## 2018-12-14 RX ADMIN — FLUTICASONE PROPIONATE 50 MCG: 50 SPRAY, METERED NASAL at 08:12

## 2018-12-14 RX ADMIN — ATORVASTATIN CALCIUM 40 MG: 20 TABLET, FILM COATED ORAL at 08:12

## 2018-12-14 NOTE — PLAN OF CARE
Problem: Adjustment to Illness (Heart Failure)  Goal: Optimal Coping  Outcome: Ongoing (interventions implemented as appropriate)  Patient is lying in bed and we discussed several different ways for her to get a hold on the fluid retention before it gets to the point of having to be in the hospital.  Weigh Daily; Limit intake of fluids; limit Sodium intake, Walk.  For all these suggestions she had a reason why she couldn't do them.  Will continue to monitor and will reinforce the issues.

## 2018-12-14 NOTE — NURSING
Pt in bed resting at this time, VSS as charted per f/s. Pt voided to bedside commode, amount adequate. New order to transition pt from IV to PO diuretic. Edema 1+ to BLEs, pt denies pain at this time. Weight taken, decreased from yesterday's. Order for Mg and K supplementation initiated. PIV # 20 to RFA c/d/i infusing Mg per MAR. Tele monitor in place scoping 68 NSR. No current distress noted at this time. Will follow up and continue to monitor.

## 2018-12-14 NOTE — NURSING
Pt in bed resting with no distress noted at this time. VSS as charted per f/s. Some edema remains to blat lower ext, looks to be improving. Pt receiving diuretics, voided 700cc this shift, states she normally doesn't void much when on diuretic at home. MD aware, weight taken and charted this shift, slightly improved. PRN for pain provided earlier, pt denies pain currently. Currently unlabored on 2L NC. Education provided. Pt more receptive to education today vs yesterday. PIV c/d/i, K and Mg supplementation provided per MD order. No other changes noted this shift. Report to be given to night shift RN.

## 2018-12-14 NOTE — PROGRESS NOTES
Hospital Medicine  Progress note    Team: [facility] HOSP MED R Arnold Gramajo MD  Admit Date: 12/10/2018  FELICIANO 12/14/2018  Code status: Full Code    Principal Problem:  Acute on chronic heart failure with normal ejection fraction    Interval hx: Patient seen and examined at bedside, no acute events overnight. Lasix IV transitioned to po Torsemide. Possible discharge tomorrow.     ROS     Constitutional: Positive for fatigue. Negative for chills and fever.   Eyes: Negative for pain and itching.   Respiratory: Positive for cough and shortness of breath. Negative for chest tightness.    Cardiovascular: Positive for leg swelling. Negative for chest pain.   Gastrointestinal: Positive for abdominal distention. Negative for abdominal pain, diarrhea, nausea and vomiting.   Endocrine: Negative for polydipsia and polyuria.   Genitourinary: Negative for dysuria and frequency.   Musculoskeletal: Negative for back pain and myalgias.   Neurological: Negative for light-headedness and headaches.   Psychiatric/Behavioral: Negative for confusion. The patient is not nervous/anxious.            PEx  Temp:  [96.2 °F (35.7 °C)-98.1 °F (36.7 °C)]   Pulse:  [64-80]   Resp:  [16-22]   BP: (120-162)/(58-77)   SpO2:  [96 %-100 %]     Intake/Output Summary (Last 24 hours) at 12/14/2018 1255  Last data filed at 12/14/2018 0807  Gross per 24 hour   Intake 500 ml   Output 2300 ml   Net -1800 ml       Constitutional: She is oriented to person, place, and time. No distress.   HENT:   Head: Normocephalic and atraumatic.   Eyes: Right eye exhibits no discharge. Left eye exhibits no discharge.   Neck: JVD present.   Cardiovascular: Normal rate. Exam reveals no gallop and no friction rub.   Pulmonary/Chest: Effort normal. No stridor. She has no wheezes. She has rales.   Abdominal: Soft. Bowel sounds are normal. There is no tenderness. There is no guarding.   Musculoskeletal: She exhibits edema.   Neurological: She is alert and oriented to person, place, and  time.   Skin: Skin is warm and dry. She is not diaphoretic.   Psychiatric: She has a normal mood and affect. Her behavior is normal.         Recent Labs   Lab 12/12/18  0412 12/13/18  0553 12/14/18  0710   WBC 8.41 6.84 8.01   HGB 9.1* 9.1* 9.1*   HCT 29.1* 29.4* 29.8*    263 254     Recent Labs   Lab 12/12/18  0412 12/13/18  0553 12/14/18  0710   * 137 136   K 3.6 3.2* 3.6   CL 94* 95 94*   CO2 29 29 33*   BUN 17 18 23   CREATININE 1.1 0.8 0.9   GLU 95 92 96   CALCIUM 8.4* 8.3* 8.4*   MG 1.4* 1.2* 1.5*   PHOS 3.8 3.7 4.1     Recent Labs   Lab 12/10/18  1727  12/12/18 0412 12/13/18  0553 12/14/18  0710   ALKPHOS 96   < > 74 72 83   ALT 13   < > 8* 8* 8*   AST 14   < > 10 10 10   ALBUMIN 3.4*   < > 2.8* 2.6* 2.8*   PROT 7.3   < > 5.9* 5.7* 6.3   BILITOT 0.4   < > 0.3 0.4 0.3   INR 0.9  --   --   --   --     < > = values in this interval not displayed.      No results for input(s): POCTGLUCOSE in the last 168 hours.  No results for input(s): CPK, CPKMB, MB, TROPONINI in the last 72 hours.    Scheduled Meds:   albuterol-ipratropium  3 mL Nebulization Q6H    amLODIPine  10 mg Oral Daily    aspirin  81 mg Oral Daily    atorvastatin  40 mg Oral Daily    carvedilol  6.25 mg Oral BID    cetirizine  10 mg Oral Daily    cloNIDine  0.1 mg Oral Q12H    enoxaparin  40 mg Subcutaneous Daily    fluticasone-vilanterol  1 puff Inhalation Daily    ibuprofen  200 mg Oral Once    lisinopril  2.5 mg Oral Daily    magnesium sulfate IVPB  2 g Intravenous Once    torsemide  40 mg Oral BID     Continuous Infusions:  As Needed:  acetaminophen, fluticasone, guaiFENesin, HYDROcodone-acetaminophen, sodium chloride 0.9%    Active Hospital Problems    Diagnosis  POA    *Acute on chronic heart failure with normal ejection fraction [I50.33]  Yes    CHF (congestive heart failure) [I50.9]  Yes    Edema [R60.9]  Yes    Hypomagnesemia [E83.42]  Yes    Chronic obstructive pulmonary disease [J44.9]  Yes    Hypertension,  benign [I10]  Yes      Resolved Hospital Problems   No resolved problems to display.       Overview      Assessment and Plan for Problems addressed today:    Acute on chronic heart failure with normal ejection fraction     Pt with history of HFPEF   Transition IV lasix to PO torsemide BID  pure wick catheter ordered for Intake and output  Cardiology consulted per request of card clinic  adding low dose valsartan  2 D echo, normal EF, indeterminate diastolic function  Daily weights  Intake and output   Edema     Refer to CHF         Hypertension, benign        There has been multiple recent changes due to electrolyte abnormalities  Continuing clonidine and amlodipine for now  Holding BB in setting of HF exacerbation, most likely can resume tmw as pt becoming more euvolemic    prior anti HTN meds: (Stopped lisinopril - secondary to hyperkalemia   Stopped hctz due to hyponatremia), will add low dose valsartan, continue clonidine   Better controlled         Chronic obstructive pulmonary disease     Is using more oxygen than baseline however likely due to volume overload  Not concerned for acute exacerbation of COPD  Continue home dose medication/ inhalers  Duonebs ARTC q 6hrs       Diet: Low sodium  GI PPx:   DVT PPx:    VTE Risk Mitigation (From admission, onward)        Ordered     enoxaparin injection 40 mg  Daily      12/10/18 2005     IP VTE HIGH RISK PATIENT  Once      12/10/18 2005     Place sequential compression device  Until discontinued      12/10/18 2002        Goals of Care: Full code     Discharge plan and follow up: pending diuresis and negative I/O, cardiology recs    Provider    Arnold Robert MD  Staff Hospitalist  Department of Hospital Medicine  [facility]   [phone]

## 2018-12-14 NOTE — ASSESSMENT & PLAN NOTE
67 y/o F w/ HFpEF presented from clinic with ADHF and weight gain around 27 kg.. Patient hypertensive and has had electrolyte imbalances with lisinopril and HCTZ in the past (hyperkalemia, hyponatremia, respectively). BNP 200s but not accurate in patients with obesity. Normal EF on echo with no wall motion abnormalities. CVP 3. Patient appeared volume overloaded on admission (+JVD, rales, pitting edema of LE's and weight gain). Weight from 119>111 kgs; patient still experiencing orthopnea.     Plan:  - Continue diuresis, switch to torsemide 40 mg PO BID. Bedside commode in place.   - continue carvedilol 6.25 mg PO BID for HTN  - recommend adding low dose valsartan for further bp control, would d/c lisinopril due to prior electrolyte imbalances  - daily standing weight  - strict I/Os  - 2L/day fluid restriction  - 2 g Na restriction  - maintain K>4, Mg >2

## 2018-12-14 NOTE — PROGRESS NOTES
[facility]  Cardiology  Progress Note    Patient Name: Nalini Varma  MRN: [id number]  Admission Date: 12/10/2018  Hospital Length of Stay: 3 days  Code Status: Full Code   Attending Physician: Arnold Gramajo MD   Primary Care Physician: Yane Sahni MD  Expected Discharge Date: 12/14/2018  Principal Problem:Acute on chronic heart failure with normal ejection fraction    Subjective:     Interval History: patient reports feeling back to baseline and reports making good urine.     Review of Systems   Constitution: Negative for chills and fever.   HENT: Negative for congestion.    Cardiovascular: Positive for dyspnea on exertion, leg swelling and orthopnea. Negative for chest pain, irregular heartbeat, near-syncope, palpitations and syncope.   Respiratory: Positive for shortness of breath.    Gastrointestinal: Negative for abdominal pain, nausea and vomiting.   Neurological: Negative for dizziness, focal weakness, headaches and light-headedness.     Objective:     Vital Signs (Most Recent):  Temp: 96.2 °F (35.7 °C) (12/14/18 1223)  Pulse: 77 (12/14/18 1323)  Resp: 18 (12/14/18 1323)  BP: (!) 162/77 (12/14/18 1223)  SpO2: 97 % (12/14/18 1323) Vital Signs (24h Range):  Temp:  [96.2 °F (35.7 °C)-98.1 °F (36.7 °C)] 96.2 °F (35.7 °C)  Pulse:  [64-80] 77  Resp:  [16-22] 18  SpO2:  [96 %-100 %] 97 %  BP: (120-162)/(58-77) 162/77     Weight: 111.5 kg (245 lb 14.8 oz)  Body mass index is 38.52 kg/m².     SpO2: 97 %  O2 Device (Oxygen Therapy): nasal cannula      Intake/Output Summary (Last 24 hours) at 12/14/2018 1431  Last data filed at 12/14/2018 0807  Gross per 24 hour   Intake 500 ml   Output 2300 ml   Net -1800 ml       Lines/Drains/Airways     Central Venous Catheter Line                 Percutaneous Central Line Insertion/Assessment - triple lumen  07/14/18 1200 right subclavian 153 days          Peripheral Intravenous Line                 Peripheral IV - Single Lumen 12/12/18 3320  Posterior;Right Forearm 1 day                Physical Exam   Constitutional: She is oriented to person, place, and time. She appears well-developed and well-nourished. No distress.   obese   HENT:   Head: Normocephalic and atraumatic.   Nose: Nose normal.   Periorbital edema inferior to left eye   Eyes: EOM are normal. Right eye exhibits no discharge. Left eye exhibits no discharge.   Neck: Neck supple. JVD: unable to be assessed due to neck subcutaneous tissue. No tracheal deviation present.   Cardiovascular: Normal rate, regular rhythm, normal heart sounds and intact distal pulses.   1+ pitting edema- decreased from day prior. Wrinkling of lower extremity skin appreciable.    Pulmonary/Chest: She has wheezes. She has rales.   Abdominal: Soft. She exhibits no distension. There is no tenderness. There is no rebound and no guarding.   Sacral pitting edema. Protuberant abdomen.   Neurological: She is alert and oriented to person, place, and time.   Skin: Skin is warm and dry. She is not diaphoretic.   Psychiatric: Her speech is normal.   Vitals reviewed.      Significant Labs:   BMP:   Recent Labs   Lab 12/13/18  0553 12/14/18  0710   GLU 92 96    136   K 3.2* 3.6   CL 95 94*   CO2 29 33*   BUN 18 23   CREATININE 0.8 0.9   CALCIUM 8.3* 8.4*   MG 1.2* 1.5*    and CBC   Recent Labs   Lab 12/13/18  0553 12/14/18  0710   WBC 6.84 8.01   HGB 9.1* 9.1*   HCT 29.4* 29.8*    254               Assessment and Plan:         * Acute on chronic heart failure with normal ejection fraction    67 y/o F w/ HFpEF presented from clinic with ADHF and weight gain around 27 kg.. Patient hypertensive and has had electrolyte imbalances with lisinopril and HCTZ in the past (hyperkalemia, hyponatremia, respectively). BNP 200s but not accurate in patients with obesity. Normal EF on echo with no wall motion abnormalities. CVP 3. Patient appeared volume overloaded on admission (+JVD, rales, pitting edema of LE's and weight gain).  Weight from 119>111 kgs; patient still experiencing orthopnea.     Plan:  - Continue diuresis, switch to torsemide 40 mg PO BID. Bedside commode in place.   - continue carvedilol 6.25 mg PO BID for HTN  - recommend adding low dose valsartan for further bp control, would d/c lisinopril due to prior electrolyte imbalances  - daily standing weight  - strict I/Os  - 2L/day fluid restriction  - 2 g Na restriction  - maintain K>4, Mg >2         VTE Risk Mitigation (From admission, onward)        Ordered     enoxaparin injection 40 mg  Daily      12/10/18 2005     IP VTE HIGH RISK PATIENT  Once      12/10/18 2005     Place sequential compression device  Until discontinued      12/10/18 2002          Alexy Reynolds MD   PGY-1  [phone] (pager)  12/14/2018 2:36 PM  Cardiology  [facility]

## 2018-12-14 NOTE — SUBJECTIVE & OBJECTIVE
Interval History: patient reports feeling back to baseline and reports making good urine.     Review of Systems   Constitution: Negative for chills and fever.   HENT: Negative for congestion.    Cardiovascular: Positive for dyspnea on exertion, leg swelling and orthopnea. Negative for chest pain, irregular heartbeat, near-syncope, palpitations and syncope.   Respiratory: Positive for shortness of breath.    Gastrointestinal: Negative for abdominal pain, nausea and vomiting.   Neurological: Negative for dizziness, focal weakness, headaches and light-headedness.     Objective:     Vital Signs (Most Recent):  Temp: 96.2 °F (35.7 °C) (12/14/18 1223)  Pulse: 77 (12/14/18 1323)  Resp: 18 (12/14/18 1323)  BP: (!) 162/77 (12/14/18 1223)  SpO2: 97 % (12/14/18 1323) Vital Signs (24h Range):  Temp:  [96.2 °F (35.7 °C)-98.1 °F (36.7 °C)] 96.2 °F (35.7 °C)  Pulse:  [64-80] 77  Resp:  [16-22] 18  SpO2:  [96 %-100 %] 97 %  BP: (120-162)/(58-77) 162/77     Weight: 111.5 kg (245 lb 14.8 oz)  Body mass index is 38.52 kg/m².     SpO2: 97 %  O2 Device (Oxygen Therapy): nasal cannula      Intake/Output Summary (Last 24 hours) at 12/14/2018 1431  Last data filed at 12/14/2018 0807  Gross per 24 hour   Intake 500 ml   Output 2300 ml   Net -1800 ml       Lines/Drains/Airways     Central Venous Catheter Line                 Percutaneous Central Line Insertion/Assessment - triple lumen  07/14/18 1200 right subclavian 153 days          Peripheral Intravenous Line                 Peripheral IV - Single Lumen 12/12/18 2130 Posterior;Right Forearm 1 day                Physical Exam   Constitutional: She is oriented to person, place, and time. She appears well-developed and well-nourished. No distress.   obese   HENT:   Head: Normocephalic and atraumatic.   Nose: Nose normal.   Periorbital edema inferior to left eye   Eyes: EOM are normal. Right eye exhibits no discharge. Left eye exhibits no discharge.   Neck: Neck supple. JVD: unable to be  assessed due to neck subcutaneous tissue. No tracheal deviation present.   Cardiovascular: Normal rate, regular rhythm, normal heart sounds and intact distal pulses.   1+ pitting edema- decreased from day prior. Wrinkling of lower extremity skin appreciable.    Pulmonary/Chest: She has wheezes. She has rales.   Abdominal: Soft. She exhibits no distension. There is no tenderness. There is no rebound and no guarding.   Sacral pitting edema. Protuberant abdomen.   Neurological: She is alert and oriented to person, place, and time.   Skin: Skin is warm and dry. She is not diaphoretic.   Psychiatric: Her speech is normal.   Vitals reviewed.      Significant Labs:   BMP:   Recent Labs   Lab 12/13/18  0553 12/14/18  0710   GLU 92 96    136   K 3.2* 3.6   CL 95 94*   CO2 29 33*   BUN 18 23   CREATININE 0.8 0.9   CALCIUM 8.3* 8.4*   MG 1.2* 1.5*    and CBC   Recent Labs   Lab 12/13/18  0553 12/14/18  0710   WBC 6.84 8.01   HGB 9.1* 9.1*   HCT 29.4* 29.8*    254

## 2018-12-15 VITALS
SYSTOLIC BLOOD PRESSURE: 172 MMHG | TEMPERATURE: 96 F | WEIGHT: 245.94 LBS | DIASTOLIC BLOOD PRESSURE: 80 MMHG | HEART RATE: 78 BPM | OXYGEN SATURATION: 97 % | BODY MASS INDEX: 38.6 KG/M2 | HEIGHT: 67 IN | RESPIRATION RATE: 20 BRPM

## 2018-12-15 LAB
ALBUMIN SERPL BCP-MCNC: 2.7 G/DL
ALP SERPL-CCNC: 89 U/L
ALT SERPL W/O P-5'-P-CCNC: 12 U/L
ANION GAP SERPL CALC-SCNC: 9 MMOL/L
AST SERPL-CCNC: 14 U/L
BASOPHILS # BLD AUTO: 0.02 K/UL
BASOPHILS NFR BLD: 0.2 %
BILIRUB SERPL-MCNC: 0.4 MG/DL
BUN SERPL-MCNC: 24 MG/DL
CALCIUM SERPL-MCNC: 9 MG/DL
CHLORIDE SERPL-SCNC: 93 MMOL/L
CO2 SERPL-SCNC: 33 MMOL/L
CREAT SERPL-MCNC: 1 MG/DL
DIFFERENTIAL METHOD: ABNORMAL
EOSINOPHIL # BLD AUTO: 0.1 K/UL
EOSINOPHIL NFR BLD: 1.6 %
ERYTHROCYTE [DISTWIDTH] IN BLOOD BY AUTOMATED COUNT: 16.1 %
EST. GFR  (AFRICAN AMERICAN): >60 ML/MIN/1.73 M^2
EST. GFR  (NON AFRICAN AMERICAN): 57.6 ML/MIN/1.73 M^2
GLUCOSE SERPL-MCNC: 116 MG/DL
HCT VFR BLD AUTO: 30.3 %
HGB BLD-MCNC: 9.3 G/DL
IMM GRANULOCYTES # BLD AUTO: 0.04 K/UL
IMM GRANULOCYTES NFR BLD AUTO: 0.4 %
LYMPHOCYTES # BLD AUTO: 1.2 K/UL
LYMPHOCYTES NFR BLD: 13.3 %
MAGNESIUM SERPL-MCNC: 1.6 MG/DL
MCH RBC QN AUTO: 27.4 PG
MCHC RBC AUTO-ENTMCNC: 30.7 G/DL
MCV RBC AUTO: 89 FL
MONOCYTES # BLD AUTO: 1.1 K/UL
MONOCYTES NFR BLD: 11.9 %
NEUTROPHILS # BLD AUTO: 6.6 K/UL
NEUTROPHILS NFR BLD: 72.6 %
NRBC BLD-RTO: 0 /100 WBC
PHOSPHATE SERPL-MCNC: 3.8 MG/DL
PLATELET # BLD AUTO: 271 K/UL
PMV BLD AUTO: 10 FL
POTASSIUM SERPL-SCNC: 4.3 MMOL/L
PROT SERPL-MCNC: 6.1 G/DL
RBC # BLD AUTO: 3.39 M/UL
SODIUM SERPL-SCNC: 135 MMOL/L
WBC # BLD AUTO: 9.02 K/UL

## 2018-12-15 PROCEDURE — 25000003 PHARM REV CODE 250: Performed by: PHYSICIAN ASSISTANT

## 2018-12-15 PROCEDURE — 85025 COMPLETE CBC W/AUTO DIFF WBC: CPT

## 2018-12-15 PROCEDURE — 83735 ASSAY OF MAGNESIUM: CPT

## 2018-12-15 PROCEDURE — 99238 HOSP IP/OBS DSCHRG MGMT 30/<: CPT | Mod: ,,, | Performed by: HOSPITALIST

## 2018-12-15 PROCEDURE — 25000003 PHARM REV CODE 250: Performed by: INTERNAL MEDICINE

## 2018-12-15 PROCEDURE — 25000242 PHARM REV CODE 250 ALT 637 W/ HCPCS: Performed by: HOSPITALIST

## 2018-12-15 PROCEDURE — 84100 ASSAY OF PHOSPHORUS: CPT

## 2018-12-15 PROCEDURE — 94640 AIRWAY INHALATION TREATMENT: CPT

## 2018-12-15 PROCEDURE — 99233 SBSQ HOSP IP/OBS HIGH 50: CPT | Mod: ,,, | Performed by: HOSPITALIST

## 2018-12-15 PROCEDURE — 94761 N-INVAS EAR/PLS OXIMETRY MLT: CPT

## 2018-12-15 PROCEDURE — 80053 COMPREHEN METABOLIC PANEL: CPT

## 2018-12-15 PROCEDURE — 36415 COLL VENOUS BLD VENIPUNCTURE: CPT

## 2018-12-15 PROCEDURE — 25000003 PHARM REV CODE 250: Performed by: HOSPITALIST

## 2018-12-15 RX ORDER — CARVEDILOL 6.25 MG/1
6.25 TABLET ORAL 2 TIMES DAILY
Qty: 60 TABLET | Refills: 11 | Status: ON HOLD | OUTPATIENT
Start: 2018-12-15 | End: 2019-03-09 | Stop reason: SDUPTHER

## 2018-12-15 RX ORDER — AMLODIPINE BESYLATE 10 MG/1
10 TABLET ORAL DAILY
Qty: 30 TABLET | Refills: 11 | Status: ON HOLD | OUTPATIENT
Start: 2018-12-16 | End: 2019-03-09 | Stop reason: SDUPTHER

## 2018-12-15 RX ORDER — FAMOTIDINE 20 MG/1
20 TABLET, FILM COATED ORAL 2 TIMES DAILY
Status: DISCONTINUED | OUTPATIENT
Start: 2018-12-15 | End: 2018-12-15

## 2018-12-15 RX ORDER — FAMOTIDINE 20 MG/1
20 TABLET, FILM COATED ORAL 2 TIMES DAILY
Status: DISCONTINUED | OUTPATIENT
Start: 2018-12-15 | End: 2018-12-15 | Stop reason: HOSPADM

## 2018-12-15 RX ORDER — LISINOPRIL 2.5 MG/1
2.5 TABLET ORAL DAILY
Qty: 90 TABLET | Refills: 3 | Status: SHIPPED | OUTPATIENT
Start: 2018-12-16 | End: 2019-02-20 | Stop reason: ALTCHOICE

## 2018-12-15 RX ORDER — TORSEMIDE 20 MG/1
40 TABLET ORAL 2 TIMES DAILY
Qty: 120 TABLET | Refills: 11 | Status: SHIPPED | OUTPATIENT
Start: 2018-12-15 | End: 2019-01-24 | Stop reason: SDUPTHER

## 2018-12-15 RX ADMIN — FLUTICASONE FUROATE AND VILANTEROL TRIFENATATE 1 PUFF: 100; 25 POWDER RESPIRATORY (INHALATION) at 09:12

## 2018-12-15 RX ADMIN — ASPIRIN 81 MG: 81 TABLET, COATED ORAL at 09:12

## 2018-12-15 RX ADMIN — CLONIDINE HYDROCHLORIDE 0.1 MG: 0.1 TABLET ORAL at 09:12

## 2018-12-15 RX ADMIN — IPRATROPIUM BROMIDE AND ALBUTEROL SULFATE 3 ML: .5; 3 SOLUTION RESPIRATORY (INHALATION) at 12:12

## 2018-12-15 RX ADMIN — CARVEDILOL 6.25 MG: 6.25 TABLET, FILM COATED ORAL at 09:12

## 2018-12-15 RX ADMIN — FAMOTIDINE 20 MG: 20 TABLET ORAL at 03:12

## 2018-12-15 RX ADMIN — AMLODIPINE BESYLATE 10 MG: 10 TABLET ORAL at 09:12

## 2018-12-15 RX ADMIN — TORSEMIDE 40 MG: 20 TABLET ORAL at 09:12

## 2018-12-15 RX ADMIN — LISINOPRIL 2.5 MG: 2.5 TABLET ORAL at 09:12

## 2018-12-15 RX ADMIN — FLUTICASONE PROPIONATE 50 MCG: 50 SPRAY, METERED NASAL at 09:12

## 2018-12-15 RX ADMIN — CETIRIZINE HYDROCHLORIDE 10 MG: 10 TABLET, FILM COATED ORAL at 09:12

## 2018-12-15 RX ADMIN — IPRATROPIUM BROMIDE AND ALBUTEROL SULFATE 3 ML: .5; 3 SOLUTION RESPIRATORY (INHALATION) at 05:12

## 2018-12-15 RX ADMIN — ATORVASTATIN CALCIUM 40 MG: 20 TABLET, FILM COATED ORAL at 09:12

## 2018-12-15 NOTE — DISCHARGE SUMMARY
Discharge Summary  Hospital Medicine    Attending Provider on Discharge: Arnold Gramajo MD  [facility] Medicine Team: [facility] HOSP MED R  Date of Admission:  12/10/2018     Date of Discharge:  12/15/2018  Code status: Full Code    Active Hospital Problems    Diagnosis  POA    *Acute on chronic heart failure with normal ejection fraction [I50.33]  Yes    CHF (congestive heart failure) [I50.9]  Yes    Edema [R60.9]  Yes    Hypomagnesemia [E83.42]  Yes    Chronic obstructive pulmonary disease [J44.9]  Yes    Hypertension, benign [I10]  Yes      Resolved Hospital Problems   No resolved problems to display.        HPI  67 YO lady with history HTN, HFpEF, COPD on 2 L O2 at home who is sent to ED from cardiology office after she presented with worsening lower extremity edema and abdominal bloating   pt was noted to have a weight gain of  12 lbs since her visit 2.5 months ago.Patient denies chest pain with exertion or at rest, palpitations, dizziness, syncope, or claudication, n,v ,d. She also denies medication noncompliance at home . She reports eating a low salt diet.  She is not weighing herself daily. Reports using frozen veggies but otherwise denies any salt intake. She is sleeping on 3 pillows one more than usual and is using 2 L more oxygen than normal.   she states that she has not noted much urin output despite taking her medication.   In ED pt noted  , Troponin 0.035 and bilateral pleural effusions with pulmonary vascular congestion seen on CXR, cardiology was consulted and recommended admission and IV diuresuis      Hospital Course    Acute on chronic heart failure with normal ejection fraction     Pt with history of HFPEF   Transition IV lasix to PO torsemide BID  Negative 3.7 liters since admission  pure wick catheter ordered for Intake and output  Cardiology consulted per request of card clinic  Added low dose valsartan and Coreg   2 D echo, normal EF, indeterminate diastolic function  Daily weights    Edema     Refer to CHF         Hypertension, benign        There has been multiple recent changes due to electrolyte abnormalities  Continuing clonidine and amlodipine, adding Coreg, dc atenolol   Holding BB in setting of HF exacerbation, most likely can resume tmw as pt becoming more euvolemic    prior anti HTN meds: (Stopped lisinopril - secondary to hyperkalemia   Stopped hctz due to hyponatremia), will add low dose valsartan, continue clonidine and add Coreg   Better controlled         Chronic obstructive pulmonary disease     Is using more oxygen than baseline however likely due to volume overload  Not concerned for acute exacerbation of COPD  Continue home dose medication/ inhalers  Duonebs ARTC q 6hrs                Recent Labs   Lab 12/13/18  0553 12/14/18  0710 12/15/18  0417   WBC 6.84 8.01 9.02   HGB 9.1* 9.1* 9.3*   HCT 29.4* 29.8* 30.3*    254 271     Recent Labs   Lab 12/13/18  0553 12/14/18  0710 12/15/18  0417    136 135*   K 3.2* 3.6 4.3   CL 95 94* 93*   CO2 29 33* 33*   BUN 18 23 24*   CREATININE 0.8 0.9 1.0   GLU 92 96 116*   CALCIUM 8.3* 8.4* 9.0   MG 1.2* 1.5* 1.6   PHOS 3.7 4.1 3.8     Recent Labs   Lab 12/10/18  1727  12/13/18  0553 12/14/18  0710 12/15/18  0417   ALKPHOS 96   < > 72 83 89   ALT 13   < > 8* 8* 12   AST 14   < > 10 10 14   ALBUMIN 3.4*   < > 2.6* 2.8* 2.7*   PROT 7.3   < > 5.7* 6.3 6.1   BILITOT 0.4   < > 0.4 0.3 0.4   INR 0.9  --   --   --   --     < > = values in this interval not displayed.      No results for input(s): POCTGLUCOSE in the last 168 hours.  No results for input(s): CPK, CPKMB, MB, TROPONINI in the last 72 hours.    No results for input(s): LACTATE in the last 72 hours.   CXR:     Ill-defined decreased attenuation perihilar and basilar lungs while nonspecific concerning for vascular congestion and edema.  There is poor definition lung bases which may be artifactual from superimposed structures underlying effusion not excluded.  There is no  pneumothorax.  Continued enlargement of the cardiac silhouette with atherosclerotic aorta.  Visualized osseous structures grossly intact.  Clinical correlation and follow-up advised    Procedures: none    Consultants: Cardiology     Current Discharge Medication List      START taking these medications    Details   carvedilol (COREG) 6.25 MG tablet Take 1 tablet (6.25 mg total) by mouth 2 (two) times daily.  Qty: 60 tablet, Refills: 11      lisinopril (PRINIVIL,ZESTRIL) 2.5 MG tablet Take 1 tablet (2.5 mg total) by mouth once daily.  Qty: 90 tablet, Refills: 3         CONTINUE these medications which have CHANGED    Details   amLODIPine (NORVASC) 10 MG tablet Take 1 tablet (10 mg total) by mouth once daily.  Qty: 30 tablet, Refills: 11      torsemide (DEMADEX) 20 MG Tab Take 2 tablets (40 mg total) by mouth 2 (two) times daily.  Qty: 120 tablet, Refills: 11         CONTINUE these medications which have NOT CHANGED    Details   albuterol (VENTOLIN HFA) 90 mcg/actuation inhaler inhale 1-2 puffs as needed every 6 hours for wheezing and shortness of breath  Qty: 18 g, Refills: 11      alendronate (FOSAMAX) 70 MG tablet Take 1 tablet (70 mg total) by mouth every 7 days. on Wednesday  Qty: 12 tablet, Refills: 3      aspirin (ECOTRIN) 81 MG EC tablet Take 81 mg by mouth once daily.      atorvastatin (LIPITOR) 40 MG tablet TAKE 1 TABLET BY MOUTH EVERY DAILY  Qty: 90 tablet, Refills: 3    Associated Diagnoses: Hyperlipidemia, unspecified hyperlipidemia type      biotin 1 mg Cap Take by mouth.      calcium carbonate (OS-SANDRINE) 500 mg calcium (1,250 mg) chewable tablet Take 2 tablets by mouth daily as needed for Heartburn.      cloNIDine (CATAPRES) 0.1 MG tablet Take 1 tablet (0.1 mg total) by mouth every 12 (twelve) hours.  Qty: 60 tablet, Refills: 0      cyanocobalamin 1,000 mcg TbSR Take 1,000 mcg by mouth once daily.  Qty: 90 tablet, Refills: 1    Associated Diagnoses: B12 deficiency      famotidine (PEPCID) 20 MG tablet TAKE  1 TABLET BY MOUTH TWO TIMES A DAY  Qty: 180 tablet, Refills: 3      fluticasone (FLONASE) 50 mcg/actuation nasal spray 1 spray by Each Nare route 2 (two) times daily as needed for Rhinitis.      fluticasone-salmeterol (ADVAIR HFA) 115-21 mcg/actuation HFAA Inhale 2 puffs into the lungs every 12 (twelve) hours.  Qty: 36 g, Refills: 3    Associated Diagnoses: Chronic bronchitis, unspecified chronic bronchitis type      guaiFENesin (MUCINEX) 600 mg 12 hr tablet Take 1,200 mg by mouth 2 (two) times daily as needed for Congestion.      HYDROcodone-acetaminophen (NORCO)  mg per tablet Take 1 tablet by mouth every 12 hours as needed for pain  Qty: 60 tablet, Refills: 0    Associated Diagnoses: Chronic pain disorder      loratadine (CLARITIN) 10 mg tablet Take 10 mg by mouth once daily.      magnesium oxide 400 mg Cap Take 1 capsule by mouth once daily.      umeclidinium (INCRUSE ELLIPTA) 62.5 mcg/actuation DsDv Inhale 1 puff into the lungs once daily. Controller  Qty: 90 each, Refills: 3    Associated Diagnoses: Chronic bronchitis, unspecified chronic bronchitis type      vitamin D 1000 units Tab Take 1,000 Units by mouth once daily.      walker (ULTRA-LIGHT ROLLATOR) Misc 1 each by Misc.(Non-Drug; Combo Route) route once daily.  Qty: 1 each, Refills: 0         STOP taking these medications       atenolol (TENORMIN) 50 MG tablet Comments:   Reason for Stopping:               Discharge Diet:cardiac diet with Normal Fluid intake of 1500 - 2000 mL per day    Activity: activity as tolerated    Discharge Condition: Good    Disposition: Home or Self Care    Tests pending at the time of discharge: none       Time spent  on the discharge of the patient including review of hospital course with the patient. reviewing discharge medications and arranging follow-up care     Discharge examination Patient was seen and examined on the date of discharge and determined to be suitable for discharge.    Discharge plan and follow up:    follow up with PCP and cardiology     Future Appointments   Date Time Provider Department Center   12/19/2018 11:40 AM Yane Sahni MD [facility] IM [facility]       Provider  Arnold Robert MD  Staff Hospitalist  Department of [facility] Medicine  [facility]   [phone]

## 2018-12-15 NOTE — PLAN OF CARE
Problem: Adult Inpatient Plan of Care  Goal: Plan of Care Review  Outcome: Ongoing (interventions implemented as appropriate)  No signs of distress noted this shift.  Patient tolerating po diuretic this shift.  No complaints of pain this shift.  Gave po pepcid per MD order and patient request.

## 2018-12-15 NOTE — PLAN OF CARE
Problem: Adult Inpatient Plan of Care  Goal: Plan of Care Review  Outcome: Ongoing (interventions implemented as appropriate)  Discharge summary reviewed with patient. Verbalized understanding. PIV removed. Telemonitor removed. Patient received take home prescriptions. Patient leave unit with transport via .

## 2018-12-15 NOTE — PROGRESS NOTES
Hospital Medicine  Progress note    Team: [facility] HOSP MED R Arnold Gramajo MD  Admit Date: 12/10/2018  FELICIANO 12/15/2018  Code status: Full Code    Principal Problem:  Acute on chronic heart failure with normal ejection fraction    Interval hx: Patient seen and examined at bedside, no acute events overnight. Tolerating po diuretics with good urine output, will discharge pending bedside delivery of medications.     ROS     Constitutional: Positive for fatigue. Negative for chills and fever.   Eyes: Negative for pain and itching.   Respiratory: Positive for cough and shortness of breath. Negative for chest tightness.    Cardiovascular: Positive for leg swelling. Negative for chest pain.   Gastrointestinal: Positive for abdominal distention. Negative for abdominal pain, diarrhea, nausea and vomiting.   Endocrine: Negative for polydipsia and polyuria.   Genitourinary: Negative for dysuria and frequency.   Musculoskeletal: Negative for back pain and myalgias.   Neurological: Negative for light-headedness and headaches.   Psychiatric/Behavioral: Negative for confusion. The patient is not nervous/anxious.            PEx  Temp:  [96.8 °F (36 °C)-98.5 °F (36.9 °C)]   Pulse:  [68-86]   Resp:  [16-20]   BP: (138-190)/(67-83)   SpO2:  [93 %-97 %]     Intake/Output Summary (Last 24 hours) at 12/15/2018 1616  Last data filed at 12/15/2018 0300  Gross per 24 hour   Intake --   Output 600 ml   Net -600 ml       Constitutional: She is oriented to person, place, and time. No distress.   HENT:   Head: Normocephalic and atraumatic.   Eyes: Right eye exhibits no discharge. Left eye exhibits no discharge.   Neck: JVD present.   Cardiovascular: Normal rate. Exam reveals no gallop and no friction rub.   Pulmonary/Chest: Effort normal. No stridor. She has no wheezes. She has rales.   Abdominal: Soft. Bowel sounds are normal. There is no tenderness. There is no guarding.   Musculoskeletal: She exhibits edema.   Neurological: She is alert and  oriented to person, place, and time.   Skin: Skin is warm and dry. She is not diaphoretic.   Psychiatric: She has a normal mood and affect. Her behavior is normal.         Recent Labs   Lab 12/13/18  0553 12/14/18  0710 12/15/18  0417   WBC 6.84 8.01 9.02   HGB 9.1* 9.1* 9.3*   HCT 29.4* 29.8* 30.3*    254 271     Recent Labs   Lab 12/13/18  0553 12/14/18  0710 12/15/18  0417    136 135*   K 3.2* 3.6 4.3   CL 95 94* 93*   CO2 29 33* 33*   BUN 18 23 24*   CREATININE 0.8 0.9 1.0   GLU 92 96 116*   CALCIUM 8.3* 8.4* 9.0   MG 1.2* 1.5* 1.6   PHOS 3.7 4.1 3.8     Recent Labs   Lab 12/10/18  1727  12/13/18  0553 12/14/18  0710 12/15/18  0417   ALKPHOS 96   < > 72 83 89   ALT 13   < > 8* 8* 12   AST 14   < > 10 10 14   ALBUMIN 3.4*   < > 2.6* 2.8* 2.7*   PROT 7.3   < > 5.7* 6.3 6.1   BILITOT 0.4   < > 0.4 0.3 0.4   INR 0.9  --   --   --   --     < > = values in this interval not displayed.      No results for input(s): POCTGLUCOSE in the last 168 hours.  No results for input(s): CPK, CPKMB, MB, TROPONINI in the last 72 hours.    Scheduled Meds:   albuterol-ipratropium  3 mL Nebulization Q6H    amLODIPine  10 mg Oral Daily    aspirin  81 mg Oral Daily    atorvastatin  40 mg Oral Daily    carvedilol  6.25 mg Oral BID    cetirizine  10 mg Oral Daily    cloNIDine  0.1 mg Oral Q12H    enoxaparin  40 mg Subcutaneous Daily    famotidine  20 mg Oral BID    fluticasone-vilanterol  1 puff Inhalation Daily    ibuprofen  200 mg Oral Once    lisinopril  2.5 mg Oral Daily    torsemide  40 mg Oral BID     Continuous Infusions:  As Needed:  acetaminophen, fluticasone, guaiFENesin, HYDROcodone-acetaminophen, sodium chloride 0.9%    Active Hospital Problems    Diagnosis  POA    *Acute on chronic heart failure with normal ejection fraction [I50.33]  Yes    CHF (congestive heart failure) [I50.9]  Yes    Edema [R60.9]  Yes    Hypomagnesemia [E83.42]  Yes    Chronic obstructive pulmonary disease [J44.9]  Yes     Hypertension, benign [I10]  Yes      Resolved Hospital Problems   No resolved problems to display.       Overview      Assessment and Plan for Problems addressed today:    Acute on chronic heart failure with normal ejection fraction     Pt with history of HFPEF   Transition IV lasix to PO torsemide BID  Negative 3.7 liters since admission  pure wick catheter ordered for Intake and output  Cardiology consulted per request of card clinic  adding low dose valsartan  2 D echo, normal EF, indeterminate diastolic function  Daily weights  Intake and output   Edema     Refer to CHF         Hypertension, benign        There has been multiple recent changes due to electrolyte abnormalities  Continuing clonidine and amlodipine for now  Holding BB in setting of HF exacerbation, most likely can resume tmw as pt becoming more euvolemic    prior anti HTN meds: (Stopped lisinopril - secondary to hyperkalemia   Stopped hctz due to hyponatremia), will add low dose valsartan, continue clonidine   Better controlled         Chronic obstructive pulmonary disease     Is using more oxygen than baseline however likely due to volume overload  Not concerned for acute exacerbation of COPD  Continue home dose medication/ inhalers  Duonebs ARTC q 6hrs       Diet: Low sodium  GI PPx:   DVT PPx:    VTE Risk Mitigation (From admission, onward)        Ordered     enoxaparin injection 40 mg  Daily      12/10/18 2005     IP VTE HIGH RISK PATIENT  Once      12/10/18 2005     Place sequential compression device  Until discontinued      12/10/18 2002        Goals of Care: Full code     Discharge plan and follow up: discharge today pending bedside delivery    Provider    Arnold Robert MD  Staff Hospitalist  Department of Hospital Medicine  [facility]   [phone]

## 2018-12-18 ENCOUNTER — PATIENT OUTREACH (OUTPATIENT)
Dept: ADMINISTRATIVE | Facility: CLINIC | Age: 68
End: 2018-12-18

## 2018-12-18 ENCOUNTER — PATIENT MESSAGE (OUTPATIENT)
Dept: INTERNAL MEDICINE | Facility: CLINIC | Age: 68
End: 2018-12-18

## 2018-12-18 DIAGNOSIS — G89.4 CHRONIC PAIN DISORDER: ICD-10-CM

## 2018-12-18 RX ORDER — HYDROCODONE BITARTRATE AND ACETAMINOPHEN 10; 325 MG/1; MG/1
1 TABLET ORAL EVERY 12 HOURS
Qty: 60 TABLET | Refills: 0 | Status: SHIPPED | OUTPATIENT
Start: 2018-12-18 | End: 2020-01-31 | Stop reason: SDUPTHER

## 2018-12-18 NOTE — PATIENT INSTRUCTIONS

## 2018-12-19 ENCOUNTER — OFFICE VISIT (OUTPATIENT)
Dept: INTERNAL MEDICINE | Facility: CLINIC | Age: 68
End: 2018-12-19
Attending: INTERNAL MEDICINE
Payer: MEDICARE

## 2018-12-19 VITALS
HEIGHT: 67 IN | SYSTOLIC BLOOD PRESSURE: 127 MMHG | OXYGEN SATURATION: 97 % | DIASTOLIC BLOOD PRESSURE: 60 MMHG | WEIGHT: 256.81 LBS | HEART RATE: 94 BPM | BODY MASS INDEX: 40.31 KG/M2

## 2018-12-19 DIAGNOSIS — M25.562 CHRONIC PAIN OF LEFT KNEE: ICD-10-CM

## 2018-12-19 DIAGNOSIS — E83.42 HYPOMAGNESEMIA: ICD-10-CM

## 2018-12-19 DIAGNOSIS — R60.1 ANASARCA: ICD-10-CM

## 2018-12-19 DIAGNOSIS — G89.29 CHRONIC PAIN OF LEFT KNEE: ICD-10-CM

## 2018-12-19 DIAGNOSIS — J44.9 CHRONIC OBSTRUCTIVE PULMONARY DISEASE, UNSPECIFIED COPD TYPE: ICD-10-CM

## 2018-12-19 DIAGNOSIS — S32.030D COMPRESSION FRACTURE OF L3 LUMBAR VERTEBRA WITH ROUTINE HEALING: ICD-10-CM

## 2018-12-19 DIAGNOSIS — F17.200 TOBACCO DEPENDENCE: ICD-10-CM

## 2018-12-19 DIAGNOSIS — M81.8 OTHER OSTEOPOROSIS WITHOUT CURRENT PATHOLOGICAL FRACTURE: ICD-10-CM

## 2018-12-19 DIAGNOSIS — J96.11 CHRONIC RESPIRATORY FAILURE WITH HYPOXIA: ICD-10-CM

## 2018-12-19 DIAGNOSIS — E04.1 THYROID NODULE: ICD-10-CM

## 2018-12-19 DIAGNOSIS — I10 HYPERTENSION, BENIGN: ICD-10-CM

## 2018-12-19 DIAGNOSIS — E78.5 HYPERLIPIDEMIA, UNSPECIFIED HYPERLIPIDEMIA TYPE: ICD-10-CM

## 2018-12-19 DIAGNOSIS — E87.1 HYPONATREMIA: ICD-10-CM

## 2018-12-19 DIAGNOSIS — F11.20 UNCOMPLICATED OPIOID DEPENDENCE: ICD-10-CM

## 2018-12-19 DIAGNOSIS — Z09 HOSPITAL DISCHARGE FOLLOW-UP: Primary | ICD-10-CM

## 2018-12-19 DIAGNOSIS — R91.1 PULMONARY NODULE: ICD-10-CM

## 2018-12-19 DIAGNOSIS — E87.6 HYPOKALEMIA: ICD-10-CM

## 2018-12-19 DIAGNOSIS — Z23 NEED FOR INFLUENZA VACCINATION: ICD-10-CM

## 2018-12-19 DIAGNOSIS — E66.01 OBESITY, MORBID (MORE THAN 100 LBS OVER IDEAL WEIGHT OR BMI > 40): ICD-10-CM

## 2018-12-19 DIAGNOSIS — I50.32 CHRONIC DIASTOLIC HEART FAILURE: ICD-10-CM

## 2018-12-19 PROCEDURE — 1101F PT FALLS ASSESS-DOCD LE1/YR: CPT | Mod: CPTII,S$GLB,, | Performed by: INTERNAL MEDICINE

## 2018-12-19 PROCEDURE — G0008 ADMIN INFLUENZA VIRUS VAC: HCPCS | Mod: S$GLB,,, | Performed by: INTERNAL MEDICINE

## 2018-12-19 PROCEDURE — 99214 OFFICE O/P EST MOD 30 MIN: CPT | Mod: 25,S$GLB,, | Performed by: INTERNAL MEDICINE

## 2018-12-19 PROCEDURE — 3074F SYST BP LT 130 MM HG: CPT | Mod: CPTII,S$GLB,, | Performed by: INTERNAL MEDICINE

## 2018-12-19 PROCEDURE — 99499 UNLISTED E&M SERVICE: CPT | Mod: S$GLB,,, | Performed by: INTERNAL MEDICINE

## 2018-12-19 PROCEDURE — 3078F DIAST BP <80 MM HG: CPT | Mod: CPTII,S$GLB,, | Performed by: INTERNAL MEDICINE

## 2018-12-19 PROCEDURE — 99999 PR PBB SHADOW E&M-EST. PATIENT-LVL IV: CPT | Mod: PBBFAC,,, | Performed by: INTERNAL MEDICINE

## 2018-12-19 PROCEDURE — 90662 IIV NO PRSV INCREASED AG IM: CPT | Mod: S$GLB,,, | Performed by: INTERNAL MEDICINE

## 2018-12-19 NOTE — PROGRESS NOTES
"Patient was given vaccine information sheet for the Flu Vaccine. The area of injection was palpated using the acromion process as a landmark. This area was cleaned with alcohol. Using a 25g 1" safety needle, 0.5mL of the vaccine was placed into the left deltoid muscle. The injection site was dressed with a bandage. Patient experienced no complications and was discharged in stable condition. Fluzone High Dose vaccine Lot: [id number]: [id number]    "

## 2018-12-19 NOTE — PROGRESS NOTES
Subjective:   Patient ID: Nalini Varma is a 68 y.o. female  Chief complaint:   Chief Complaint   Patient presents with    Follow-up     ER f/u       HPI   Pt with hx of HFpEF (had LHC and RHC earlier this year), HTN, tobacco dep, COPD on 2L home O2, osteoporosis with L3 compression fracture on alendronate, hx of LESLY 2/2 GIB due to NSAID use, hypoMag, hyponatremia suspected 2/2 SIADH in past,  thyroid nodule: s/p FNA 5/2017 and benign, hx of pulm nodules with repeat CT 2/8/2018 with stable opacicities and rec to repeat CT in 1 year, OA of shoulders and knees with L knee > Right after surgery followed by ortho outside of [facility] - sx currently managed with norco bid, ansarca     Pt here for ER f/u for volume overload for diastolic HF with preserved EF after seen in cards appt and was volume overloaded after nonresponsive to oral diuresis as outpt     - received IV lasix and then transitioned to PO torsemide 40mg twice daily and improved response    - has tried to weigh self 1-2 times since d/c to home and unable to get a good weight due to inability to stand due to chronic knee pain and view scale  - today reports edema in legs and abd edema is same as when returned home a few days ago from hospital - has not noticed worsening of this   - breathing status is stable as well   - she is avoiding salt - not adding or cooking with salt - eating steamed vegetables   - she reports weight today is higher compaired to prior wt on 12/14 as expected due wearing heavy shoes and layers of clothing with cold weather and using different scale  - last weight in our clinic was from 7/31/2018 and 264# and today 256#  - today she is noticeably less edematous since last seen in our clinic   - she is amenable to HH but only with nursing to assist with bp checks and weight checks and to obtain labs next week - declined PT/OT/aide  - reviewed D/C summary and med changes - dig htn program pharamcist reviewed list with hospitalist and  "unable to obtain valsartan due to recall so lisinopril was added in its place  - prev had hyperkalemia with higher dose of lisinopril and this was stopped but was not taking current dose of diuretics   - stopped atenolol and added coreg  - cont clonidine and amlodipine  - torsemide taking as above     HTN: bp elevated but was nervous at start of appt as rushing and late for appt - currently enrolled in dig htn program - has not checked bp at home - repeat bp today after resting improved - she is concerned not placing home cuff correctly on arm - does agree to HH with nursing to assist with this    COPD on 2L home O2: nelson inhalers and compliant, nelson home O2 and sats 97% today - no changes or decline in breathing status     Resumed smoking since last seen! She reports is not ready to quit and will let me know if reconsiders     pulm nodules: was eval by pulm - did complete repeat CT scan of chest and stable 2/2018 - plan was to repeat CT chest in 1 year   - now with pleural effusions from HF exacerbation     thyroid nodule: s/p FNA 5/2017 and benign   Review of Systems    Objective:  Vitals:    12/19/18 1315 12/19/18 1347   BP: (!) 148/62 127/60   Pulse: 94    SpO2: 97%    Weight: 116.5 kg (256 lb 13.4 oz)    Height: 5' 7" (1.702 m)      Body mass index is 40.23 kg/m².    Physical Exam   Constitutional: She is oriented to person, place, and time. She appears well-developed and well-nourished. No distress.   HENT:   Head: Normocephalic and atraumatic.   Right Ear: External ear normal.   Left Ear: External ear normal.   Nose: Nose normal.   Mouth/Throat: Oropharynx is clear and moist.   Periorbital edema inf to L lower eye > right   Eyes: Conjunctivae and EOM are normal.   Neck: Normal range of motion. Neck supple.   Cardiovascular: Normal rate, regular rhythm and intact distal pulses.   Pulmonary/Chest: Effort normal and breath sounds normal. No stridor. She has no wheezes.   Dec breath sounds bilaterally    Abdominal: " Soft. Normal appearance and bowel sounds are normal. She exhibits no mass. There is no tenderness. There is no rebound and no guarding.   Protuberant   Sacral pitting edema present    Musculoskeletal: She exhibits edema. She exhibits no tenderness.   +2 pitting edema of B LE to knees bilaterally  Wrinkling of skin at B LE present  No erythema or tenderness     Chronic L > right knee edema    Neurological: She is alert and oriented to person, place, and time. She has normal strength. Gait normal.   Skin: Skin is warm, dry and intact. She is not diaphoretic. No cyanosis. Nails show no clubbing.   Psychiatric: She has a normal mood and affect. Her speech is normal and behavior is normal. Cognition and memory are normal.   Vitals reviewed.    Assessment:  1. Hospital discharge follow-up    2. Chronic diastolic heart failure    3. Tobacco dependence    4. Other osteoporosis without current pathological fracture    5. Hyponatremia    6. Hypokalemia    7. Hypertension, benign    8. Chronic obstructive pulmonary disease, unspecified COPD type    9. Need for influenza vaccination    10. Obesity, morbid (more than 100 lbs over ideal weight or BMI > 40)    11. Compression fracture of L3 lumbar vertebra with routine healing    12. Uncomplicated opioid dependence    13. Thyroid nodule s/p biopsy 2017 - benign    14. Pulmonary nodule    15. Hypomagnesemia    16. Hyperlipidemia, unspecified hyperlipidemia type    17. Chronic respiratory failure with hypoxia    18. Chronic pain of left knee    19. Anasarca        Plan:  Nalini was seen today for follow-up.    Diagnoses and all orders for this visit:    Hospital discharge follow-up    Chronic diastolic heart failure  -     Ambulatory referral to Home Health  -     Ambulatory Referral to Cardiology    Tobacco dependence    Other osteoporosis without current pathological fracture    Hyponatremia    Hypokalemia  -     Basic metabolic panel; Future  -     Magnesium;  Future    Hypertension, benign    Chronic obstructive pulmonary disease, unspecified COPD type    Need for influenza vaccination  -     Influenza - High Dose (65+) (PF) (IM)    Obesity, morbid (more than 100 lbs over ideal weight or BMI > 40)    Compression fracture of L3 lumbar vertebra with routine healing    Uncomplicated opioid dependence    Thyroid nodule s/p biopsy 2017 - benign    Pulmonary nodule    Hypomagnesemia    Hyperlipidemia, unspecified hyperlipidemia type    Chronic respiratory failure with hypoxia    Chronic pain of left knee    Anasarca    cont home meds - weight down from lcv here (unfortunally last weight on our scale is from 7/2018) but pt not able to weigh self at this time at home  - she reports edema and breathing status unchanged since recent hospital disharge  - today she appears markedly less edematous since last seen by me months ago   She agrees to HH today but declined PT/OT/aide   - rec nursing to review meds, help with bp checks at home and obtaining weights - counseled on notifying MD if gains more than 3 pounds over 1-2 days    - pt high risk for readmission - counseled her on RTC and ER prompts   - will check BMP and mag through HH in 1 week   - unable to obtain valsartan per message with dig htn and inpt MD and started on lisinopril low dose in its place   - pt with hx of hyperkalemia with lisinopril 40mg 5/2018 and was not on diuretic at that time   - will repeat labs to monitor renal function and electrolytes  - she has resumed mag ox supplement and tolerating     - repeat bp improved after resting     Office to assist pt in scheduling f/u cards appt     Give flu vaccine today   Counseled to quit tobacco!   rtc in 4 weeks for f/u     Health Maintenance   Topic Date Due    Influenza Vaccine  08/01/2018    Mammogram  03/21/2019    Colonoscopy  05/22/2020    DEXA SCAN  08/23/2020    Lipid Panel  04/24/2023    TETANUS VACCINE  01/29/2025    Hepatitis C Screening  Completed     Zoster Vaccine  Completed    Pneumococcal Vaccine (65+ Low/Medium Risk)  Completed

## 2018-12-20 ENCOUNTER — PATIENT MESSAGE (OUTPATIENT)
Dept: INTERNAL MEDICINE | Facility: CLINIC | Age: 68
End: 2018-12-20

## 2018-12-20 NOTE — TELEPHONE ENCOUNTER
Please contact [facility] to which order was faxed yesterday and give verbal for PT and OT through  now that pt is in agreement  - if more is needed, please let me know    - please let pt know that I rec PT and OT through  and not at a separate location and that  will contact her to set up therapies

## 2018-12-28 ENCOUNTER — TELEPHONE (OUTPATIENT)
Dept: INTERNAL MEDICINE | Facility: CLINIC | Age: 68
End: 2018-12-28

## 2018-12-28 DIAGNOSIS — E83.42 HYPOMAGNESEMIA: Primary | ICD-10-CM

## 2018-12-28 RX ORDER — CALCIUM CARBONATE/VITAMIN D3 500-10/5ML
1 LIQUID (ML) ORAL EVERY 12 HOURS
Status: ON HOLD | COMMUNITY
Start: 2018-12-28 | End: 2021-09-07 | Stop reason: SDUPTHER

## 2018-12-28 NOTE — TELEPHONE ENCOUNTER
Please notify pt that labs are stable overall except that magnesium level has decreased - rec inc over the counter magnesium oxide supplement from 400mg daily to twice daily if tolerates   - let's repeat magnesium level in 1-2 weeks through  nurse - please fax order to them - thanks!     Labs 12/26/2018 through  and sent to [facility]:  Glu: 88  BUN 20  Cr 1.14  GFR 49  Na 132  K+ 3.9  Chloride 89  CO2 28  Ca 9.0  Magnesium 1.1

## 2018-12-31 NOTE — TELEPHONE ENCOUNTER
Spoke w/ pt and informed her of PCP rec   Pt verbally agree and orders has been faxed to [facility]

## 2019-01-04 ENCOUNTER — TELEPHONE (OUTPATIENT)
Dept: INTERNAL MEDICINE | Facility: CLINIC | Age: 69
End: 2019-01-04

## 2019-01-04 NOTE — TELEPHONE ENCOUNTER
----- Message from Farzaneh Hernández sent at 1/4/2019 12:43 PM CST -----  Contact: [phone]/Amari Ext [phone]  Name of Who is Calling: Amari       What is the request in detail: Amari called he need additional information, please call him.       Can the clinic reply by [facility]: no      What [facility] to [facility]: [phone] Ext [phone]

## 2019-01-07 ENCOUNTER — PATIENT OUTREACH (OUTPATIENT)
Dept: OTHER | Facility: OTHER | Age: 69
End: 2019-01-07

## 2019-01-07 ENCOUNTER — OFFICE VISIT (OUTPATIENT)
Dept: CARDIOLOGY | Facility: CLINIC | Age: 69
End: 2019-01-07
Payer: MEDICARE

## 2019-01-07 ENCOUNTER — TELEPHONE (OUTPATIENT)
Dept: INTERNAL MEDICINE | Facility: CLINIC | Age: 69
End: 2019-01-07

## 2019-01-07 VITALS
DIASTOLIC BLOOD PRESSURE: 70 MMHG | BODY MASS INDEX: 38.89 KG/M2 | HEIGHT: 67 IN | HEART RATE: 92 BPM | SYSTOLIC BLOOD PRESSURE: 148 MMHG | WEIGHT: 247.81 LBS

## 2019-01-07 DIAGNOSIS — F17.200 TOBACCO DEPENDENCE: ICD-10-CM

## 2019-01-07 DIAGNOSIS — E87.6 HYPOKALEMIA: ICD-10-CM

## 2019-01-07 DIAGNOSIS — I10 HYPERTENSION, BENIGN: ICD-10-CM

## 2019-01-07 DIAGNOSIS — J44.9 CHRONIC OBSTRUCTIVE PULMONARY DISEASE, UNSPECIFIED COPD TYPE: ICD-10-CM

## 2019-01-07 DIAGNOSIS — I50.30 HEART FAILURE WITH PRESERVED EJECTION FRACTION, BORDERLINE, CLASS III: ICD-10-CM

## 2019-01-07 DIAGNOSIS — J96.11 CHRONIC RESPIRATORY FAILURE WITH HYPOXIA: ICD-10-CM

## 2019-01-07 DIAGNOSIS — E87.1 HYPONATREMIA: ICD-10-CM

## 2019-01-07 DIAGNOSIS — E78.5 HYPERLIPIDEMIA, UNSPECIFIED HYPERLIPIDEMIA TYPE: ICD-10-CM

## 2019-01-07 DIAGNOSIS — I50.30 HEART FAILURE WITH PRESERVED EJECTION FRACTION: Primary | ICD-10-CM

## 2019-01-07 PROCEDURE — 99214 OFFICE O/P EST MOD 30 MIN: CPT | Mod: S$GLB,,, | Performed by: INTERNAL MEDICINE

## 2019-01-07 PROCEDURE — 3078F PR MOST RECENT DIASTOLIC BLOOD PRESSURE < 80 MM HG: ICD-10-PCS | Mod: CPTII,S$GLB,, | Performed by: INTERNAL MEDICINE

## 2019-01-07 PROCEDURE — 99499 UNLISTED E&M SERVICE: CPT | Mod: S$GLB,,, | Performed by: INTERNAL MEDICINE

## 2019-01-07 PROCEDURE — 1101F PT FALLS ASSESS-DOCD LE1/YR: CPT | Mod: CPTII,S$GLB,, | Performed by: INTERNAL MEDICINE

## 2019-01-07 PROCEDURE — 3078F DIAST BP <80 MM HG: CPT | Mod: CPTII,S$GLB,, | Performed by: INTERNAL MEDICINE

## 2019-01-07 PROCEDURE — 99499 RISK ADDL DX/OHS AUDIT: ICD-10-PCS | Mod: S$GLB,,, | Performed by: INTERNAL MEDICINE

## 2019-01-07 PROCEDURE — 99214 PR OFFICE/OUTPT VISIT, EST, LEVL IV, 30-39 MIN: ICD-10-PCS | Mod: S$GLB,,, | Performed by: INTERNAL MEDICINE

## 2019-01-07 PROCEDURE — 3077F SYST BP >= 140 MM HG: CPT | Mod: CPTII,S$GLB,, | Performed by: INTERNAL MEDICINE

## 2019-01-07 PROCEDURE — 99999 PR PBB SHADOW E&M-EST. PATIENT-LVL III: CPT | Mod: PBBFAC,,, | Performed by: INTERNAL MEDICINE

## 2019-01-07 PROCEDURE — 1101F PR PT FALLS ASSESS DOC 0-1 FALLS W/OUT INJ PAST YR: ICD-10-PCS | Mod: CPTII,S$GLB,, | Performed by: INTERNAL MEDICINE

## 2019-01-07 PROCEDURE — 3077F PR MOST RECENT SYSTOLIC BLOOD PRESSURE >= 140 MM HG: ICD-10-PCS | Mod: CPTII,S$GLB,, | Performed by: INTERNAL MEDICINE

## 2019-01-07 PROCEDURE — 99999 PR PBB SHADOW E&M-EST. PATIENT-LVL III: ICD-10-PCS | Mod: PBBFAC,,, | Performed by: INTERNAL MEDICINE

## 2019-01-07 RX ORDER — CLONIDINE HYDROCHLORIDE 0.1 MG/1
0.1 TABLET ORAL EVERY 12 HOURS
Qty: 60 TABLET | Refills: 0 | Status: SHIPPED | OUTPATIENT
Start: 2019-01-07 | End: 2019-02-05 | Stop reason: SDUPTHER

## 2019-01-07 RX ORDER — METOLAZONE 5 MG/1
5 TABLET ORAL
Status: DISCONTINUED | OUTPATIENT
Start: 2019-01-07 | End: 2019-01-07

## 2019-01-07 RX ORDER — METOLAZONE 5 MG/1
5 TABLET ORAL DAILY
Qty: 30 TABLET | Refills: 11 | Status: SHIPPED | OUTPATIENT
Start: 2019-01-07 | End: 2019-02-20

## 2019-01-07 NOTE — LETTER
January 7, 2019      Arnold Gramajo MD  [address] [facility] [address]           [facility] - Cardiology  [address]  [place] [address]  Phone: [phone]          Patient: Nalini Varma   MR Number: [id number]   YOB: 1950   Date of Visit: 1/7/2019       Dear Dr. Arnold Gramajo:    Thank you for referring Nalini Varma to me for evaluation. Attached you will find relevant portions of my assessment and plan of care.    If you have questions, please do not hesitate to call me. I look forward to following Nalini Varma along with you.    Sincerely,    Parvez Ortez MD    Enclosure  CC:  No Recipients    If you would like to receive this communication electronically, please contact [email or web address] or [phone] to request more information on [employer] access.    For providers and/or their staff who would like to refer a patient to [facility], please contact us through our one-stop-shop provider referral line, [facility], at [phone].    If you feel you have received this communication in error or would no longer like to receive these types of communications, please e-mail [email or web address]

## 2019-01-07 NOTE — PROGRESS NOTES
Patient, Nalini Varma (MRN #[id number]), presented with a recorded BMI of 39.4 kg/m^2 and a documented comorbidity(s):  - Hypertension  - Hyperlipidemia  - Atrial Fibrillation  to which the severe obesity is a contributing factor. This is consistent with the definition of severe obesity (BMI 35.0-39.9) with comorbidity (ICD-10 E66.01, Z68.35). The patient's severe obesity was monitored, evaluated, addressed and/or treated. This addendum to the medical record is made on 01/07/2019.

## 2019-01-07 NOTE — PROGRESS NOTES
Chart has been dictated using voice recognition software.  It is not been reviewed carefully for any transcriptional errors due to this technology.   Subjective:   Patient ID:  Nalini Varma is a 68 y.o. female who presents for follow-up of Congestive Heart Failure (Hospital d/c 12/15/18) and Acute on chronic heart failure with normal ejection fraction      HPI: Patient with  nonischemic heart failure with normal ejection fraction, Chronic obstructive pulmonary disease, Hypertension, Tobacco dependence, and Hyperlipidemia.     At her last clinic visit, patient was volume overloaded was admitted to the hospital.  She was diuresed 3.7 L and discharge from hospital.  Right heart catheterization sure why-2018 showed increased pulmonary pressure with normal ventricular pressures the right left side and LVEDP of 12.    Patient states she is doing better since hospital discharge with less fluid.  She is trying to exercise more.  Notes when she exercises she has less edema.  Patient denies any chest discomfort on exertion or at rest. Patient denies any palpitations, lightheadedness, or syncope. Has 3 pillow orthopnea, no PND    Echocardiogram (11-Dec-2018);  · Normal left ventricular systolic function. The estimated ejection fraction is 65%  · Indeterminate left ventricular diastolic function.  · No wall motion abnormalities.  · Normal right ventricular systolic function.  · Moderate left atrial enlargement.  · Normal central venous pressure (3 mm Hg).  · Trace tricuspid regurgitation.  · Trace aortic regurgitation.  · Aortic valve not well seen. increase gradient across the valve related to increase LVOT flow, there is no stenosis by doppler     Weight is down by 6 kg since her last visit.    Past Medical History:   Diagnosis Date    Allergy     Anemia due to gastrointestinal blood loss     LESLY from gastric ulcer due to chronic nsaid use    Anxiety     Arthritis     CKD (chronic kidney disease) stage 3, GFR 30-59  ml/min     [facility] - recieved labs from 2017 and 2/2018    Gastric ulcer     H/O knee surgery 2001    right    Heart failure with preserved ejection fraction 8/20/2018    Hx of psychiatric care     Hyperlipidemia     Hypertension     Hyponatremia     Psychiatric problem     Therapy     Tobacco abuse        Outpatient Medications Prior to Visit   Medication Sig Dispense Refill    albuterol (VENTOLIN HFA) 90 mcg/actuation inhaler inhale 1-2 puffs as needed every 6 hours for wheezing and shortness of breath 18 g 11    alendronate (FOSAMAX) 70 MG tablet Take 1 tablet (70 mg total) by mouth every 7 days. on Wednesday 12 tablet 3    amLODIPine (NORVASC) 10 MG tablet Take 1 tablet (10 mg total) by mouth once daily. 30 tablet 11    aspirin (ECOTRIN) 81 MG EC tablet Take 81 mg by mouth once daily.      atorvastatin (LIPITOR) 40 MG tablet TAKE 1 TABLET BY MOUTH EVERY DAILY 90 tablet 3    biotin 1 mg Cap Take by mouth.      calcium carbonate (OS-SANDRINE) 500 mg calcium (1,250 mg) chewable tablet Take 2 tablets by mouth daily as needed for Heartburn.      carvedilol (COREG) 6.25 MG tablet Take 1 tablet (6.25 mg total) by mouth 2 (two) times daily. 60 tablet 11    cyanocobalamin 1,000 mcg TbSR Take 1,000 mcg by mouth once daily. (Patient taking differently: Take 1,000 mcg by mouth daily as needed. ) 90 tablet 1    famotidine (PEPCID) 20 MG tablet TAKE 1 TABLET BY MOUTH TWO TIMES A  tablet 3    fluticasone (FLONASE) 50 mcg/actuation nasal spray 1 spray by Each Nare route 2 (two) times daily as needed for Rhinitis.      fluticasone-salmeterol (ADVAIR HFA) 115-21 mcg/actuation HFAA Inhale 2 puffs into the lungs every 12 (twelve) hours. 36 g 3    guaiFENesin (MUCINEX) 600 mg 12 hr tablet Take 1,200 mg by mouth 2 (two) times daily as needed for Congestion.      HYDROcodone-acetaminophen (NORCO)  mg per tablet Take 1 tablet by mouth every 12 hours as needed for pain 60 tablet 0     "lisinopril (PRINIVIL,ZESTRIL) 2.5 MG tablet Take 1 tablet (2.5 mg total) by mouth once daily. 90 tablet 3    loratadine (CLARITIN) 10 mg tablet Take 10 mg by mouth once daily.      magnesium oxide 400 mg Cap Take 1 capsule by mouth every 12 (twelve) hours.      torsemide (DEMADEX) 20 MG Tab Take 2 tablets (40 mg total) by mouth 2 (two) times daily. 120 tablet 11    umeclidinium (INCRUSE ELLIPTA) 62.5 mcg/actuation DsDv Inhale 1 puff into the lungs once daily. Controller 90 each 3    vitamin D 1000 units Tab Take 1,000 Units by mouth once daily.      walker (ULTRA-LIGHT ROLLATOR) Misc 1 each by Misc.(Non-Drug; Combo Route) route once daily. 1 each 0    cloNIDine (CATAPRES) 0.1 MG tablet Take 1 tablet (0.1 mg total) by mouth every 12 (twelve) hours. 60 tablet 0     No facility-administered medications prior to visit.        ROS - C/o left arm pain due to arthritis in back and shoulder.  Otherwise, No change from prior visit in neurologic, respiratory, endocrine, GI, hematologic, or constitutional complaints    Objective:   Physical Exam   Constitutional: Nasal cannula in place.   Examined in clothes sitting in a chair.  BP (!) 148/70 (BP Location: Left arm, Patient Position: Sitting, BP Method: Large (Automatic))   Pulse 92   Ht 5' 6.5" (1.689 m)   Wt 112.4 kg (247 lb 12.8 oz)   BMI 39.40 kg/m²      Neck: JVD (to mid-neck) present. Carotid bruit is not present.   Cardiovascular: Normal rate, regular rhythm, S1 normal and S2 normal.   Murmur heard.   Harsh midsystolic murmur is present with a grade of 2/6 at the upper right sternal border radiating to the neck.  High-pitched blowing holosystolic murmur of grade 2/6 is also present at the apex.  Pulmonary/Chest: She has decreased breath sounds (diffuse bronchovesicular sounds). She has no rales.   Abdominal: Soft. There is no tenderness.   Musculoskeletal: She exhibits edema (Three to 4+ tense edema to knees bilaterally with; trace edema in dependent portions " of her thighs; trace to 1+ presacral edema.).         Lab Results   Component Value Date     (L) 12/15/2018    K 4.3 12/15/2018    BUN 24 (H) 12/15/2018    CREATININE 1.0 12/15/2018     (H) 12/15/2018    HGBA1C 5.3 12/11/2018    CHOL 136 04/24/2018    HDL 74 04/24/2018    LDLCALC 49.2 (L) 04/24/2018    TRIG 64 04/24/2018    CHOLHDL 54.4 (H) 04/24/2018    HGB 9.3 (L) 12/15/2018    HCT 30.3 (L) 12/15/2018     12/15/2018    INR 0.9 12/10/2018       Assessment:     1. Heart failure with preserved ejection fraction    2. Heart failure with preserved ejection fraction, borderline, class III    3. Chronic obstructive pulmonary disease, unspecified COPD type    4. Hypertension, benign    5. Tobacco dependence    6. Hyperlipidemia, unspecified hyperlipidemia type    7. Hyponatremia    8. Hypokalemia    9. Chronic respiratory failure with hypoxia      Patient is feeling better overall with it.  The she admits that she has significant dyspnea relates most of it to her pulmonary disease.  She states that her edema is markedly down although she still has a significant amount of edema in her legs and presacral area.  Therefore, with metolazone will be added to her therapeutic regimen and a repeat BMP will be obtained next week.  The patient should return in 6 weeks for repeat evaluation.  Plan:     Nalini was seen today for congestive heart failure and acute on chronic heart failure with normal ejection fraction.    Diagnoses and all orders for this visit:    Heart failure with preserved ejection fraction  -     Basic metabolic panel; Future; Expected date: 01/07/2019    Heart failure with preserved ejection fraction, borderline, class III    Chronic obstructive pulmonary disease, unspecified COPD type    Hypertension, benign    Tobacco dependence    Hyperlipidemia, unspecified hyperlipidemia type    Hyponatremia    Hypokalemia    Chronic respiratory failure with hypoxia    Other orders  -     Discontinue:  metOLazone tablet 5 mg  -     metOLazone (ZAROXOLYN) 2.5 MG tablet; Take 2 tablets (5 mg total) by mouth once daily. 30 min before morning torsemide  -     cloNIDine (CATAPRES) 0.1 MG tablet; Take 1 tablet (0.1 mg total) by mouth every 12 (twelve) hours.          Parvez Ortez MD  Consultative Cardiology

## 2019-01-07 NOTE — TELEPHONE ENCOUNTER
Printed out the med list of this pt and faxed it to [facility] [phone]

## 2019-01-07 NOTE — TELEPHONE ENCOUNTER
----- Message from Julio Shah sent at 1/7/2019  2:43 PM CST -----  Contact: Tory with [employer]  Name of Who is Calling: Tory with [employer]      What is the request in detail: is requesting a date on medication change. An update med list can be faxed [phone]      Can the clinic reply by [facility]: no      What Number to Call Back if not in [facility]: [phone]

## 2019-01-07 NOTE — TELEPHONE ENCOUNTER
----- Message from Julio Shah sent at 1/7/2019  2:43 PM CST -----  Contact: Tory with [employer]  Name of Who is Calling: Tory with [employer]      What is the request in detail: is requesting a date on medication change. An update med list can be faxed [phone]      Can the clinic reply by [facility]: no      What Number [facility]: [phone]

## 2019-01-09 ENCOUNTER — TELEPHONE (OUTPATIENT)
Dept: ADMINISTRATIVE | Facility: CLINIC | Age: 69
End: 2019-01-09

## 2019-01-09 NOTE — PROGRESS NOTES
Last 5 Patient Entered Readings                                      Current 30 Day Average: 142/86     Recent Readings 1/4/2019 12/27/2018 12/26/2018 12/22/2018 12/6/2018    SBP (mmHg) 155 139 146 128 155    DBP (mmHg) 94 90 89 72 96    Pulse 93 79 84 79 73        Digital Medicine: Health  Follow Up    Lifestyle Modifications:    1.Dietary Modifications (Sodium intake <2,000mg/day, food labels, dining out):     2.Physical Activity:    Patient has been trying to exercise more- small bits of walking and chair exercises (leg lifts). Would not like to set a walking goal right now- some days is unable to walk due to leg pain, and other days feels better. Will try to walk around the courtyard once a day    Patient is still attempting to quit smoking- trying new strategy of putting cigarette to her mouth without lighting it.     3.Medication Therapy: Patient has been compliant with the medication regimen.    4.Patient has the following medication side effects/concerns:   (Frequency/Alleviating factors/Precipitating factors, etc.)     Follow up with Ms. Nalini Varma completed. No further questions or concerns. Will continue to follow up to achieve health goals.

## 2019-01-11 ENCOUNTER — PATIENT OUTREACH (OUTPATIENT)
Dept: OTHER | Facility: OTHER | Age: 69
End: 2019-01-11

## 2019-01-11 NOTE — PROGRESS NOTES
Last 5 Patient Entered Readings                                      Current 30 Day Average: 142/86     Recent Readings 1/4/2019 12/27/2018 12/26/2018 12/22/2018 12/6/2018    SBP (mmHg) 155 139 146 128 155    DBP (mmHg) 94 90 89 72 96    Pulse 93 79 84 79 73        Hypertension Medications     amLODIPine (NORVASC) 10 MG tablet Take 1 tablet (10 mg total) by mouth once daily.    carvedilol (COREG) 6.25 MG tablet Take 1 tablet (6.25 mg total) by mouth 2 (two) times daily.    cloNIDine (CATAPRES) 0.1 MG tablet Take 1 tablet (0.1 mg total) by mouth every 12 (twelve) hours.    lisinopril (PRINIVIL,ZESTRIL) 2.5 MG tablet Take 1 tablet (2.5 mg total) by mouth once daily.    metOLazone (ZAROXOLYN) 5 MG tablet Take 1 tablet (5 mg total) by mouth once daily.    torsemide (DEMADEX) 20 MG Tab Take 2 tablets (40 mg total) by mouth 2 (two) times daily.     Called patient for BP follow up. No answer. Left message for call back    [facility] has valsartan available. Will discuss with patient switching lisinopril to valsartan 40 mg.     Radha Sol, Pharm.D.   [employer] Medicine Clinical Pharmacist  [phone]

## 2019-01-15 ENCOUNTER — PATIENT MESSAGE (OUTPATIENT)
Dept: ADMINISTRATIVE | Facility: OTHER | Age: 69
End: 2019-01-15

## 2019-01-23 DIAGNOSIS — R60.0 LOCALIZED EDEMA: Primary | ICD-10-CM

## 2019-01-23 RX ORDER — HYDROCODONE BITARTRATE AND ACETAMINOPHEN 10; 325 MG/1; MG/1
1 TABLET ORAL EVERY 12 HOURS PRN
Qty: 60 TABLET | Refills: 0 | Status: SHIPPED | OUTPATIENT
Start: 2019-01-23 | End: 2019-02-28 | Stop reason: SDUPTHER

## 2019-01-23 NOTE — TELEPHONE ENCOUNTER
Received BMP ordered by cardiologist and drawn by  nurse through [facility] 1/19/2019:     Na 122  Cr 1.82  BUN 63  K 3.6  Chloride 80  CO2 28  Ca 9.6  Mag 2.0    Baseline labs from 12/26/2018 through  and sent to [facility] lab:  Glu: 88  BUN 20  Cr 1.14  GFR 49  Na 132  K+ 3.9  Chloride 89  CO2 28  Ca 9.0  Magnesium 1.1    - Will cont mag oxide 400mg bid   - Needs refill on norco -  reviewed and consistent   - alendronate until renal function improves     - Following 1L fluid restriction which was recently resumed by pt after lcv with cards   - currently taking torsemide 40mg q12 and metolazone 5mg which was recently added at lcv with cards   - pt reports edema markedly improved - still unable to see scale at home but will request  nurse to assist with this   - for now - will hold torsemide and metolazone x 2 days and resume (corrected to say resume torsemide not metolazone) dosing on Friday   - will repeat bmp through  and notify [facility] of recent BMP findings for additional recs     - please fax bmp order to  to be drawn Monday Jan 28th - rec fax to me and her cardiologist Dr. Ortez

## 2019-01-24 RX ORDER — TORSEMIDE 20 MG/1
40 TABLET ORAL 2 TIMES DAILY
Qty: 120 TABLET | Refills: 11 | Status: ON HOLD | OUTPATIENT
Start: 2019-01-24 | End: 2019-03-09 | Stop reason: SDUPTHER

## 2019-01-29 RX ORDER — FAMOTIDINE 20 MG/1
20 TABLET, FILM COATED ORAL 2 TIMES DAILY
Qty: 180 TABLET | Refills: 3 | Status: SHIPPED | OUTPATIENT
Start: 2019-01-29 | End: 2020-03-02 | Stop reason: SDUPTHER

## 2019-02-04 ENCOUNTER — PATIENT MESSAGE (OUTPATIENT)
Dept: INTERNAL MEDICINE | Facility: CLINIC | Age: 69
End: 2019-02-04

## 2019-02-04 DIAGNOSIS — I10 HYPERTENSION, BENIGN: Primary | ICD-10-CM

## 2019-02-04 NOTE — PROGRESS NOTES
Last 5 Patient Entered Readings                                      Current 30 Day Average: 132/73     Recent Readings 2/4/2019 1/28/2019 1/25/2019 1/22/2019 1/19/2019    SBP (mmHg) 147 137 135 128 138    DBP (mmHg) 80 76 85 81 78    Pulse 81 77 74 81 76        Hypertension Medications     amLODIPine (NORVASC) 10 MG tablet Take 1 tablet (10 mg total) by mouth once daily.    carvedilol (COREG) 6.25 MG tablet Take 1 tablet (6.25 mg total) by mouth 2 (two) times daily.    cloNIDine (CATAPRES) 0.1 MG tablet Take 1 tablet (0.1 mg total) by mouth every 12 (twelve) hours.    lisinopril (PRINIVIL,ZESTRIL) 2.5 MG tablet Take 1 tablet (2.5 mg total) by mouth once daily.    metOLazone (ZAROXOLYN) 5 MG tablet Take 1 tablet (5 mg total) by mouth once daily.    torsemide (DEMADEX) 20 MG Tab Take 2 tablets (40 mg total) by mouth 2 (two) times daily.     Called patient for BP follow up. Doing well on current medications. No complaints. Notified patient of recent study linking increased risk of lung cancer with use of an ACE Inhibitor, but informed her that Dr. Ortez only suggests switching from ACE-I to ARB if patient is intolerant to an ACE-I. Patient feels comfortable continuing on lisinopril. BP has improved overall. Occasional BP readings elevated. Patient says that she avoids salt (reads food labels and buys low sodium groceries), but she does occasionally eat something a little salty such as a few slices of pizza.     Patient is smoking cigarettes and doesn't feel like any smoking cessation option is well suited for her. She has tried chantix and the smoking cessation program in the past. She did not like the therapist that was working with her. She has transportation issues and cannot make it to weekly appointments. She was told that some of the work can be done online, but she does not have wifi.     1) BP exceedsgoal of <130/<80. Continue current BP medications.   2) Patient knows to contact me with any non-urgent  clinical changes or concerns. Go to ED or call [facility] for emergencies.   3) Will defer lifestyle counseling to digital medicine health .   4) Will continue to follow up.     Radha Sol, Pharm.D.   Digital Medicine Clinical Pharmacist  [phone]

## 2019-02-04 NOTE — TELEPHONE ENCOUNTER
Please review faxes for new bmp results   - please contact [facility] if no updated results in faxes     Last labs received were from 1/19/2019 - should have been repeated about 1 week later

## 2019-02-05 RX ORDER — CLONIDINE HYDROCHLORIDE 0.1 MG/1
0.1 TABLET ORAL EVERY 12 HOURS
Qty: 60 TABLET | Refills: 6 | Status: ON HOLD | OUTPATIENT
Start: 2019-02-05 | End: 2019-03-09 | Stop reason: SDUPTHER

## 2019-02-06 ENCOUNTER — PATIENT OUTREACH (OUTPATIENT)
Dept: ADMINISTRATIVE | Facility: HOSPITAL | Age: 69
End: 2019-02-06

## 2019-02-06 DIAGNOSIS — Z12.39 SCREENING FOR BREAST CANCER: Primary | ICD-10-CM

## 2019-02-06 NOTE — PROGRESS NOTES
Patient mammogram scheduled on the same day as upcoming appt with pcp per patient request    Patient provided with appointment details    Appointment reminder mailed    No further questions or concerns at this time.

## 2019-02-12 NOTE — TELEPHONE ENCOUNTER
Please contact [facility] for most recent lab results as have not received them to date   - please review message below - thanks!

## 2019-02-13 NOTE — TELEPHONE ENCOUNTER
Please notify pt that finally received results from [facility]:  - send labs to cardiology as well for recs and rec dec torsemide in 1/2    Kidney function and sodium improved - rec cont 1L fluid restriction and DECREASE torsemide from 40mg twice daily (holding metolazone) to 20mg twice daily to see if her sodium will improve further per cardiology recs     Please arrange bmp in 2 weeks through     1/28/2019:  Na 131  Glu 137  BUN: 49  Cr 1.31  K 4.1  Cl 92  CO2 26  Ca 9.5  Mg 2.3    Received BMP ordered by cardiologist and drawn by  nurse through [facility] 1/19/2019:   Na 122  Cr 1.82  BUN 63  K 3.6  Chloride 80  CO2 28  Ca 9.6  Mag 2.0     Baseline labs from 12/26/2018 through  and sent to [facility] lab:  Glu: 88  BUN 20  Cr 1.14  GFR 49  Na 132  K+ 3.9  Chloride 89  CO2 28  Ca 9.0  Magnesium 1.1

## 2019-02-14 ENCOUNTER — PATIENT OUTREACH (OUTPATIENT)
Dept: OTHER | Facility: OTHER | Age: 69
End: 2019-02-14

## 2019-02-14 NOTE — PROGRESS NOTES
"Last 5 Patient Entered Readings                                      Current 30 Day Average: 134/75     Recent Readings 2/8/2019 2/6/2019 2/4/2019 1/28/2019 1/25/2019    SBP (mmHg) 134 135 147 137 135    DBP (mmHg) 87 71 80 76 85    Pulse 83 79 81 77 74        Patient believes her BP has been improved, but is not sure. Reports that the fluid in her body has reduced recently.     Patient reports that she is still smoking a pack per day, and although she knows her lungs would benefit from quitting, she is not ready to do so at this time. Further notes that when she has reduced her cigarettes in the past, her BP went "jyoti high". I encouraged patient about the long term benefits of smoking cessation, however she does not display readiness to change her behavior at this time.     Digital Medicine: Health  Follow Up    Lifestyle Modifications:    1.Dietary Modifications (Sodium intake <2,000mg/day, food labels, dining out):    Continues to consistently cook at home    2.Physical Activity:    Does stuff around the house, feels that her motion is "pretty limited". States that sometimes walking is painful, and states that if it is cold out, she won't do it.     3.Medication Therapy: Patient has been compliant with the medication regimen.    4.Patient has the following medication side effects/concerns:   (Frequency/Alleviating factors/Precipitating factors, etc.)     Follow up with Ms. Naliniany Varma completed. No further questions or concerns. Will continue to follow up to achieve health goals.      "

## 2019-02-15 NOTE — TELEPHONE ENCOUNTER
I sent pt a message in epic - however please CALL her to notify her that the final recommendation is to continue the torsemide 40mg twice daily for now since her sodium improved (and not decrease it to 20mg twice daily). Just want to make sure there is no confusion on instructions since mult emails have been sent.     Would like staff to talk with her and review recs - thanks!

## 2019-02-18 ENCOUNTER — TELEPHONE (OUTPATIENT)
Dept: INTERNAL MEDICINE | Facility: CLINIC | Age: 69
End: 2019-02-18

## 2019-02-18 NOTE — TELEPHONE ENCOUNTER
Spoke with Pt earlier and told her as MD advised to to continue the current dose of Torsemide at 40mg twice daily since sodium has improved.

## 2019-02-18 NOTE — TELEPHONE ENCOUNTER
Spoke with Pt and clarified the final recommendation by MD states her sodium level is normal so take torsemide 40 mg twice daily. Pt stated she read that via portal.

## 2019-02-20 ENCOUNTER — HOSPITAL ENCOUNTER (OUTPATIENT)
Dept: RADIOLOGY | Facility: OTHER | Age: 69
Discharge: HOME | End: 2019-02-20
Attending: INTERNAL MEDICINE
Payer: MEDICARE

## 2019-02-20 ENCOUNTER — OFFICE VISIT (OUTPATIENT)
Dept: INTERNAL MEDICINE | Facility: CLINIC | Age: 69
End: 2019-02-20
Attending: INTERNAL MEDICINE
Payer: MEDICARE

## 2019-02-20 VITALS
HEART RATE: 88 BPM | SYSTOLIC BLOOD PRESSURE: 139 MMHG | WEIGHT: 238.56 LBS | HEIGHT: 66 IN | DIASTOLIC BLOOD PRESSURE: 68 MMHG | OXYGEN SATURATION: 98 % | BODY MASS INDEX: 38.34 KG/M2

## 2019-02-20 DIAGNOSIS — E78.5 HYPERLIPIDEMIA, UNSPECIFIED HYPERLIPIDEMIA TYPE: ICD-10-CM

## 2019-02-20 DIAGNOSIS — E66.01 SEVERE OBESITY (BMI 35.0-39.9) WITH COMORBIDITY: ICD-10-CM

## 2019-02-20 DIAGNOSIS — E83.42 HYPOMAGNESEMIA: ICD-10-CM

## 2019-02-20 DIAGNOSIS — I10 HYPERTENSION, BENIGN: Primary | ICD-10-CM

## 2019-02-20 DIAGNOSIS — I50.30 HEART FAILURE WITH PRESERVED EJECTION FRACTION, BORDERLINE, CLASS III: ICD-10-CM

## 2019-02-20 DIAGNOSIS — J44.9 CHRONIC OBSTRUCTIVE PULMONARY DISEASE, UNSPECIFIED COPD TYPE: ICD-10-CM

## 2019-02-20 DIAGNOSIS — R91.1 PULMONARY NODULE: ICD-10-CM

## 2019-02-20 DIAGNOSIS — R60.1 ANASARCA: ICD-10-CM

## 2019-02-20 DIAGNOSIS — E87.1 HYPONATREMIA: ICD-10-CM

## 2019-02-20 DIAGNOSIS — F11.20 UNCOMPLICATED OPIOID DEPENDENCE: ICD-10-CM

## 2019-02-20 DIAGNOSIS — F17.200 TOBACCO DEPENDENCE: ICD-10-CM

## 2019-02-20 DIAGNOSIS — Z12.39 SCREENING FOR BREAST CANCER: ICD-10-CM

## 2019-02-20 DIAGNOSIS — S32.030D COMPRESSION FRACTURE OF L3 LUMBAR VERTEBRA WITH ROUTINE HEALING: ICD-10-CM

## 2019-02-20 PROCEDURE — 77063 MAMMO DIGITAL SCREENING BILAT WITH TOMOSYNTHESIS_CAD: ICD-10-PCS | Mod: 26,,, | Performed by: INTERNAL MEDICINE

## 2019-02-20 PROCEDURE — 3078F PR MOST RECENT DIASTOLIC BLOOD PRESSURE < 80 MM HG: ICD-10-PCS | Mod: CPTII,S$GLB,, | Performed by: INTERNAL MEDICINE

## 2019-02-20 PROCEDURE — 99214 OFFICE O/P EST MOD 30 MIN: CPT | Mod: S$GLB,,, | Performed by: INTERNAL MEDICINE

## 2019-02-20 PROCEDURE — 3075F PR MOST RECENT SYSTOLIC BLOOD PRESS GE 130-139MM HG: ICD-10-PCS | Mod: CPTII,S$GLB,, | Performed by: INTERNAL MEDICINE

## 2019-02-20 PROCEDURE — 77067 MAMMO DIGITAL SCREENING BILAT WITH TOMOSYNTHESIS_CAD: ICD-10-PCS | Mod: 26,,, | Performed by: INTERNAL MEDICINE

## 2019-02-20 PROCEDURE — 3075F SYST BP GE 130 - 139MM HG: CPT | Mod: CPTII,S$GLB,, | Performed by: INTERNAL MEDICINE

## 2019-02-20 PROCEDURE — 99499 RISK ADDL DX/OHS AUDIT: ICD-10-PCS | Mod: S$GLB,,, | Performed by: INTERNAL MEDICINE

## 2019-02-20 PROCEDURE — 99999 PR PBB SHADOW E&M-EST. PATIENT-LVL IV: ICD-10-PCS | Mod: PBBFAC,,, | Performed by: INTERNAL MEDICINE

## 2019-02-20 PROCEDURE — 77063 BREAST TOMOSYNTHESIS BI: CPT | Mod: 26,,, | Performed by: INTERNAL MEDICINE

## 2019-02-20 PROCEDURE — 1101F PR PT FALLS ASSESS DOC 0-1 FALLS W/OUT INJ PAST YR: ICD-10-PCS | Mod: CPTII,S$GLB,, | Performed by: INTERNAL MEDICINE

## 2019-02-20 PROCEDURE — 99214 PR OFFICE/OUTPT VISIT, EST, LEVL IV, 30-39 MIN: ICD-10-PCS | Mod: S$GLB,,, | Performed by: INTERNAL MEDICINE

## 2019-02-20 PROCEDURE — 1101F PT FALLS ASSESS-DOCD LE1/YR: CPT | Mod: CPTII,S$GLB,, | Performed by: INTERNAL MEDICINE

## 2019-02-20 PROCEDURE — 77067 SCR MAMMO BI INCL CAD: CPT | Mod: TC

## 2019-02-20 PROCEDURE — 99999 PR PBB SHADOW E&M-EST. PATIENT-LVL IV: CPT | Mod: PBBFAC,,, | Performed by: INTERNAL MEDICINE

## 2019-02-20 PROCEDURE — 77067 SCR MAMMO BI INCL CAD: CPT | Mod: 26,,, | Performed by: INTERNAL MEDICINE

## 2019-02-20 PROCEDURE — 99499 UNLISTED E&M SERVICE: CPT | Mod: S$GLB,,, | Performed by: INTERNAL MEDICINE

## 2019-02-20 PROCEDURE — 3078F DIAST BP <80 MM HG: CPT | Mod: CPTII,S$GLB,, | Performed by: INTERNAL MEDICINE

## 2019-02-20 RX ORDER — LOSARTAN POTASSIUM 25 MG/1
25 TABLET ORAL DAILY
Qty: 90 TABLET | Refills: 3 | Status: ON HOLD | OUTPATIENT
Start: 2019-02-20 | End: 2019-03-09 | Stop reason: SDUPTHER
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
## 2019-02-28 ENCOUNTER — PATIENT MESSAGE (OUTPATIENT)
Dept: CARDIOLOGY | Facility: CLINIC | Age: 69
End: 2019-02-28

## 2019-02-28 ENCOUNTER — PATIENT MESSAGE (OUTPATIENT)
Dept: INTERNAL MEDICINE | Facility: CLINIC | Age: 69
End: 2019-02-28

## 2019-02-28 DIAGNOSIS — S32.030D COMPRESSION FRACTURE OF L3 LUMBAR VERTEBRA WITH ROUTINE HEALING: Primary | ICD-10-CM

## 2019-02-28 RX ORDER — HYDROCODONE BITARTRATE AND ACETAMINOPHEN 10; 325 MG/1; MG/1
1 TABLET ORAL EVERY 12 HOURS PRN
Qty: 60 TABLET | Refills: 0 | Status: SHIPPED | OUTPATIENT
Start: 2019-02-28 | End: 2019-04-03 | Stop reason: SDUPTHER

## 2019-03-06 ENCOUNTER — HOSPITAL ENCOUNTER (INPATIENT)
Facility: OTHER | Age: 69
LOS: 3 days | Discharge: HOME | DRG: 644 | End: 2019-03-09
Attending: EMERGENCY MEDICINE | Admitting: INTERNAL MEDICINE
Payer: MEDICARE

## 2019-03-06 DIAGNOSIS — R60.0 LOCALIZED EDEMA: ICD-10-CM

## 2019-03-06 DIAGNOSIS — E87.1 HYPONATREMIA: Primary | ICD-10-CM

## 2019-03-06 DIAGNOSIS — E66.01 SEVERE OBESITY (BMI 35.0-39.9) WITH COMORBIDITY: ICD-10-CM

## 2019-03-06 DIAGNOSIS — R42 DIZZINESS: ICD-10-CM

## 2019-03-06 DIAGNOSIS — I50.30 HEART FAILURE WITH PRESERVED EJECTION FRACTION: ICD-10-CM

## 2019-03-06 DIAGNOSIS — R07.9 CHEST PAIN: ICD-10-CM

## 2019-03-06 DIAGNOSIS — S32.030D COMPRESSION FRACTURE OF L3 LUMBAR VERTEBRA WITH ROUTINE HEALING: ICD-10-CM

## 2019-03-06 LAB
ALBUMIN SERPL BCP-MCNC: 3.5 G/DL
ALP SERPL-CCNC: 75 U/L
ALT SERPL W/O P-5'-P-CCNC: 20 U/L
ANION GAP SERPL CALC-SCNC: 12 MMOL/L
AST SERPL-CCNC: 23 U/L
BASOPHILS # BLD AUTO: 0.01 K/UL
BASOPHILS NFR BLD: 0.1 %
BILIRUB SERPL-MCNC: 0.4 MG/DL
BNP SERPL-MCNC: 87 PG/ML
BUN SERPL-MCNC: 49 MG/DL
CALCIUM SERPL-MCNC: 9.4 MG/DL
CHLORIDE SERPL-SCNC: 74 MMOL/L
CO2 SERPL-SCNC: 29 MMOL/L
CREAT SERPL-MCNC: 1.5 MG/DL
DIFFERENTIAL METHOD: ABNORMAL
EOSINOPHIL # BLD AUTO: 0 K/UL
EOSINOPHIL NFR BLD: 0.4 %
ERYTHROCYTE [DISTWIDTH] IN BLOOD BY AUTOMATED COUNT: 14.6 %
EST. GFR  (AFRICAN AMERICAN): 41 ML/MIN/1.73 M^2
EST. GFR  (NON AFRICAN AMERICAN): 35 ML/MIN/1.73 M^2
GLUCOSE SERPL-MCNC: 113 MG/DL
HCT VFR BLD AUTO: 34.1 %
HGB BLD-MCNC: 12.2 G/DL
LYMPHOCYTES # BLD AUTO: 1 K/UL
LYMPHOCYTES NFR BLD: 9.9 %
MAGNESIUM SERPL-MCNC: 3.7 MG/DL
MCH RBC QN AUTO: 29 PG
MCHC RBC AUTO-ENTMCNC: 35.8 G/DL
MCV RBC AUTO: 81 FL
MONOCYTES # BLD AUTO: 0.8 K/UL
MONOCYTES NFR BLD: 7.8 %
NEUTROPHILS # BLD AUTO: 8.1 K/UL
NEUTROPHILS NFR BLD: 81.5 %
OSMOLALITY SERPL: 254 MOSM/KG
PLATELET # BLD AUTO: 279 K/UL
PMV BLD AUTO: 9.7 FL
POTASSIUM SERPL-SCNC: 3.5 MMOL/L
PROT SERPL-MCNC: 7 G/DL
RBC # BLD AUTO: 4.2 M/UL
SODIUM SERPL-SCNC: 115 MMOL/L
TROPONIN I SERPL DL<=0.01 NG/ML-MCNC: 0.02 NG/ML
WBC # BLD AUTO: 9.89 K/UL

## 2019-03-06 PROCEDURE — 83735 ASSAY OF MAGNESIUM: CPT

## 2019-03-06 PROCEDURE — 96360 HYDRATION IV INFUSION INIT: CPT

## 2019-03-06 PROCEDURE — 93005 ELECTROCARDIOGRAM TRACING: CPT

## 2019-03-06 PROCEDURE — 83930 ASSAY OF BLOOD OSMOLALITY: CPT

## 2019-03-06 PROCEDURE — 25000003 PHARM REV CODE 250: Performed by: EMERGENCY MEDICINE

## 2019-03-06 PROCEDURE — 93010 EKG 12-LEAD: ICD-10-PCS | Mod: ,,, | Performed by: INTERNAL MEDICINE

## 2019-03-06 PROCEDURE — 84484 ASSAY OF TROPONIN QUANT: CPT

## 2019-03-06 PROCEDURE — 80053 COMPREHEN METABOLIC PANEL: CPT

## 2019-03-06 PROCEDURE — 85025 COMPLETE CBC W/AUTO DIFF WBC: CPT

## 2019-03-06 PROCEDURE — 99223 PR INITIAL HOSPITAL CARE,LEVL III: ICD-10-PCS | Mod: ,,, | Performed by: PHYSICIAN ASSISTANT

## 2019-03-06 PROCEDURE — 12000002 HC ACUTE/MED SURGE SEMI-PRIVATE ROOM

## 2019-03-06 PROCEDURE — 36415 COLL VENOUS BLD VENIPUNCTURE: CPT

## 2019-03-06 PROCEDURE — 83880 ASSAY OF NATRIURETIC PEPTIDE: CPT

## 2019-03-06 PROCEDURE — 99223 1ST HOSP IP/OBS HIGH 75: CPT | Mod: ,,, | Performed by: PHYSICIAN ASSISTANT

## 2019-03-06 PROCEDURE — 93010 ELECTROCARDIOGRAM REPORT: CPT | Mod: ,,, | Performed by: INTERNAL MEDICINE

## 2019-03-06 PROCEDURE — 20000000 HC ICU ROOM

## 2019-03-06 PROCEDURE — 99285 EMERGENCY DEPT VISIT HI MDM: CPT | Mod: 25

## 2019-03-06 RX ORDER — AMLODIPINE BESYLATE 5 MG/1
10 TABLET ORAL DAILY
Status: DISCONTINUED | OUTPATIENT
Start: 2019-03-07 | End: 2019-03-07

## 2019-03-06 RX ORDER — ONDANSETRON 8 MG/1
8 TABLET, ORALLY DISINTEGRATING ORAL EVERY 8 HOURS PRN
Status: DISCONTINUED | OUTPATIENT
Start: 2019-03-06 | End: 2019-03-09 | Stop reason: HOSPADM

## 2019-03-06 RX ORDER — FAMOTIDINE 20 MG/1
20 TABLET, FILM COATED ORAL DAILY
Status: DISCONTINUED | OUTPATIENT
Start: 2019-03-07 | End: 2019-03-07

## 2019-03-06 RX ORDER — CLONIDINE HYDROCHLORIDE 0.1 MG/1
0.1 TABLET ORAL EVERY 12 HOURS
Status: DISCONTINUED | OUTPATIENT
Start: 2019-03-06 | End: 2019-03-07

## 2019-03-06 RX ORDER — SODIUM CHLORIDE 0.9 % (FLUSH) 0.9 %
5 SYRINGE (ML) INJECTION
Status: DISCONTINUED | OUTPATIENT
Start: 2019-03-06 | End: 2019-03-07

## 2019-03-06 RX ORDER — ALBUTEROL SULFATE 90 UG/1
2 AEROSOL, METERED RESPIRATORY (INHALATION) EVERY 6 HOURS PRN
Status: DISCONTINUED | OUTPATIENT
Start: 2019-03-06 | End: 2019-03-09 | Stop reason: HOSPADM

## 2019-03-06 RX ORDER — LOSARTAN POTASSIUM 25 MG/1
25 TABLET ORAL DAILY
Status: DISCONTINUED | OUTPATIENT
Start: 2019-03-07 | End: 2019-03-07

## 2019-03-06 RX ORDER — SODIUM CHLORIDE 0.9 % (FLUSH) 0.9 %
5 SYRINGE (ML) INJECTION
Status: DISCONTINUED | OUTPATIENT
Start: 2019-03-06 | End: 2019-03-09 | Stop reason: HOSPADM

## 2019-03-06 RX ORDER — CARVEDILOL 6.25 MG/1
6.25 TABLET ORAL 2 TIMES DAILY
Status: DISCONTINUED | OUTPATIENT
Start: 2019-03-06 | End: 2019-03-07

## 2019-03-06 RX ORDER — FLUTICASONE FUROATE AND VILANTEROL 100; 25 UG/1; UG/1
1 POWDER RESPIRATORY (INHALATION) DAILY
Status: DISCONTINUED | OUTPATIENT
Start: 2019-03-07 | End: 2019-03-09 | Stop reason: HOSPADM

## 2019-03-06 RX ORDER — ACETAMINOPHEN 325 MG/1
650 TABLET ORAL EVERY 4 HOURS PRN
Status: DISCONTINUED | OUTPATIENT
Start: 2019-03-06 | End: 2019-03-09 | Stop reason: HOSPADM

## 2019-03-06 RX ORDER — ASPIRIN 81 MG/1
81 TABLET ORAL DAILY
Status: DISCONTINUED | OUTPATIENT
Start: 2019-03-07 | End: 2019-03-09 | Stop reason: HOSPADM

## 2019-03-06 RX ORDER — HYDROCODONE BITARTRATE AND ACETAMINOPHEN 10; 325 MG/1; MG/1
1 TABLET ORAL EVERY 12 HOURS PRN
Status: DISCONTINUED | OUTPATIENT
Start: 2019-03-06 | End: 2019-03-09 | Stop reason: HOSPADM

## 2019-03-06 RX ORDER — TIOTROPIUM BROMIDE 18 UG/1
1 CAPSULE ORAL; RESPIRATORY (INHALATION) DAILY
Status: DISCONTINUED | OUTPATIENT
Start: 2019-03-07 | End: 2019-03-09 | Stop reason: HOSPADM

## 2019-03-06 RX ORDER — ATORVASTATIN CALCIUM 20 MG/1
40 TABLET, FILM COATED ORAL DAILY
Status: DISCONTINUED | OUTPATIENT
Start: 2019-03-07 | End: 2019-03-09 | Stop reason: HOSPADM

## 2019-03-06 RX ORDER — HEPARIN SODIUM 5000 [USP'U]/ML
5000 INJECTION, SOLUTION INTRAVENOUS; SUBCUTANEOUS EVERY 12 HOURS
Status: DISCONTINUED | OUTPATIENT
Start: 2019-03-06 | End: 2019-03-07

## 2019-03-06 RX ORDER — SODIUM CHLORIDE 9 MG/ML
1000 INJECTION, SOLUTION INTRAVENOUS
Status: COMPLETED | OUTPATIENT
Start: 2019-03-06 | End: 2019-03-06

## 2019-03-06 RX ORDER — SODIUM CHLORIDE 9 MG/ML
INJECTION, SOLUTION INTRAVENOUS CONTINUOUS
Status: DISCONTINUED | OUTPATIENT
Start: 2019-03-07 | End: 2019-03-08

## 2019-03-06 RX ADMIN — SODIUM CHLORIDE 1000 ML: 0.9 INJECTION, SOLUTION INTRAVENOUS at 08:03

## 2019-03-06 RX ADMIN — SODIUM CHLORIDE 500 ML: 0.9 INJECTION, SOLUTION INTRAVENOUS at 03:03

## 2019-03-06 NOTE — ED TRIAGE NOTES
Pt presents from ems, complaints of dizziness.  Pt states she's been dizzy for a few days but is worse today, could not walk from kitchen to living room.  Pt denies any history of afib. Pt states she's on 2L of O2 continuously. Pt took her BP medication this morning.

## 2019-03-06 NOTE — ED PROVIDER NOTES
"Encounter Date: 3/6/2019    SCRIBE #1 NOTE: I, Saúl Funez, am scribing for, and in the presence of, Dr. Rao.       History     Chief Complaint   Patient presents with    Dizziness     pt to er with weakness and dizziness, and afib.      Seen by provider: 3:39 PM    Patient is a 68 y.o. female with a history of HTN, CKD and CHF who presents to the ED with complaint of dizziness. Patient reports experiencing mild for a few days, which then became severe this morning. She states she was "unable to function" secondary to dizziness today. She describes dizziness as a lightheaded sensation, and denies room spinning or vertigo sensation. She states her dizziness is much worse with movement, especially after standing. She reports associated nausea and decreased appetite for the past few days and notes she has not eaten a full meal in a few days. She notes mild generalized abdominal pain currerntly, but attributes this to being hungry. She also reports decreased urination for a few days. She denies vomiting, diarrhea, constipation, dysuria, shortness of breath, chest pain, fever, cough, or congestions. She notes mild wheezing secondary to COPD. She reports she recently began taking losarten about two weeks ago.       The history is provided by the patient.     Review of patient's allergies indicates:   Allergen Reactions    Wellbutrin [bupropion hcl] Other (See Comments)     Hyponatremia and hypomagnesia     Zoloft [sertraline] Other (See Comments)     Hyponatremia and hypomagnesia      Past Medical History:   Diagnosis Date    Allergy     Anemia due to gastrointestinal blood loss     LESLY from gastric ulcer due to chronic nsaid use    Anxiety     Arthritis     CKD (chronic kidney disease) stage 3, GFR 30-59 ml/min     followed by [facility] - recieved labs from 2017 and 2/2018    Gastric ulcer     H/O knee surgery 2001    right    Heart failure with preserved ejection fraction 8/20/2018    Hx of " psychiatric care     Hyperlipidemia     Hypertension     Hyponatremia     Psychiatric problem     Therapy     Tobacco abuse      Past Surgical History:   Procedure Laterality Date    ANGIOGRAM, CORONARY ARTERY N/A 7/20/2018    Performed by [facility] at [facility] C[facility]    BREAST CYST EXCISION Right     1967    COLONOSCOPY  2015    ESOPHAGOGASTRODUODENOSCOPY  2015    FRACTURE SURGERY      left femur    JOINT REPLACEMENT  2007    left knee x 2, 2nd performed 2/2 to MRSA infection 3 months after 1st surgery    ORIF FEMUR FRACTURE Left     TUBAL LIGATION       Family History   Problem Relation Age of Onset    Hypertension Mother     Alzheimer's disease Mother     Dementia Mother     Depression Mother     Myasthenia gravis Father     Arthritis Father     Rectal cancer Father     Hypertension Brother     Arthritis Brother     Colon cancer Brother     No Known Problems Son     Cancer Sister         brain    Melanoma Neg Hx     Breast cancer Neg Hx      Social History     Tobacco Use    Smoking status: Current Every Day Smoker     Packs/day: 1.50    Smokeless tobacco: Never Used   Substance Use Topics    Alcohol use: Yes     Alcohol/week: 4.2 oz     Types: 7 Shots of liquor per week     Comment: at least 1 cocktail a day    Drug use: No     Review of Systems   Constitutional: Positive for appetite change (decreased). Negative for chills and fever.   HENT: Negative for sore throat.    Respiratory: Positive for wheezing. Negative for cough and shortness of breath.    Cardiovascular: Negative for chest pain.   Gastrointestinal: Positive for abdominal pain and nausea. Negative for constipation, diarrhea and vomiting.   Genitourinary: Positive for decreased urine volume. Negative for dysuria.   Musculoskeletal: Negative for back pain and neck pain.   Skin: Negative for color change, rash and wound.   Neurological: Positive for dizziness. Negative for weakness.   Psychiatric/Behavioral:  Negative for confusion.   All other systems reviewed and are negative.      Physical Exam     Initial Vitals [03/06/19 1415]   BP Pulse Resp Temp SpO2   (!) 93/56 (!) 56 17 97.6 °F (36.4 °C) 98 %      MAP       --         Physical Exam    Nursing note and vitals reviewed.  Constitutional: She appears well-developed and well-nourished. She is Obese . No distress.   Morbidly obese.   HENT:   Head: Normocephalic and atraumatic.   Right Ear: External ear normal.   Left Ear: External ear normal.   Eyes: Conjunctivae and EOM are normal. Pupils are equal, round, and reactive to light. Right eye exhibits no discharge. Left eye exhibits no discharge. No scleral icterus.   Neck: Normal range of motion. Neck supple.   Cardiovascular: Regular rhythm and normal heart sounds. Bradycardia present.  Exam reveals no gallop and no friction rub.    No murmur heard.  Pulmonary/Chest: No stridor. No respiratory distress. She has wheezes (expiratory wheezes). She has no rhonchi. She has no rales.   Abdominal: Soft. Bowel sounds are normal. She exhibits no distension and no mass. There is no tenderness. There is no rebound and no guarding.   Musculoskeletal: She exhibits no edema.   Neurological: She is alert and oriented to person, place, and time. She has normal strength. No cranial nerve deficit or sensory deficit. GCS score is 15. GCS eye subscore is 4. GCS verbal subscore is 5. GCS motor subscore is 6.   Skin: Skin is warm and dry. Capillary refill takes less than 2 seconds.   Psychiatric: She has a normal mood and affect. Her behavior is normal. Judgment and thought content normal.         ED Course   Procedures  Labs Reviewed   CBC W/ AUTO DIFFERENTIAL - Abnormal; Notable for the following components:       Result Value    Hematocrit 34.1 (*)     MCV 81 (*)     RDW 14.6 (*)     Gran # (ANC) 8.1 (*)     Gran% 81.5 (*)     Lymph% 9.9 (*)     All other components within normal limits   COMPREHENSIVE METABOLIC PANEL - Abnormal; Notable  for the following components:    Sodium 115 (*)     Chloride 74 (*)     Glucose 113 (*)     BUN, Bld 49 (*)     Creatinine 1.5 (*)     eGFR if  41 (*)     eGFR if non  35 (*)     All other components within normal limits    Narrative:       NA, CL critical result(s) called and verbal readback obtained from   Elena Wang RN, 03/06/2019 16:45   OSMOLALITY, SERUM - Abnormal; Notable for the following components:    Osmolality 254 (*)     All other components within normal limits    Narrative:        OSMO critical result(s) called and verbal readback obtained from   [facility] (received from [facility] lab), 03/06/2019   20:02   osmo  critical result(s) called and verbal readback obtained from   [facility], 03/06/2019 19:53   MAGNESIUM - Abnormal; Notable for the following components:    Magnesium 3.7 (*)     All other components within normal limits   TROPONIN I   B-TYPE NATRIURETIC PEPTIDE   OSMOLALITY, SERUM   MAGNESIUM     EKG Readings: (Independently Interpreted)   Sinus rhythm. Rate of 72 bpm. Poor baseline due to patient movement.       Imaging Results          CT Head Without Contrast (Final result)  Result time 03/06/19 20:23:34    Final result by Jatin Milligan MD (03/06/19 20:23:34)                 Impression:      No CT evidence of territorial infarction.  No acute intracranial process.  MRI of the brain may be attempted for further evaluation.    Paranasal sinus disease.      Electronically signed by: Jatin Milligan MD  Date:    03/06/2019  Time:    20:23             Narrative:    EXAMINATION:  CT HEAD WITHOUT CONTRAST    CLINICAL HISTORY:  Neuro deficit(s), subacute;    TECHNIQUE:  Low dose axial images were obtained through the head.  Coronal and sagittal reformations were also performed. Contrast was not administered.    COMPARISON:  CT scan of the head dated 08/14/2016.    FINDINGS:  The subcutaneous tissues are unremarkable.  The bony calvarium is  intact.  There is mucosal thickening within the left maxillary sinus.  The remainder of the paranasal sinuses are unremarkable.  The mastoid air cells are clear.  There are postoperative changes in the orbits.    The craniocervical junction is within normal limits.  There are no extra-axial fluid collections.  There is no evidence of intracranial hemorrhage.  The ventricles and sulci are prominent, suggestive of cerebral volume loss.  The cisterns are unremarkable.  The gray-white differentiation is maintained.  There is no evidence of mass effect.                                 Medical Decision Making:   Initial Assessment:   69 y/o F hx of dizziness, generalized weakness.   Some positional component to dizziness but not classic vertigo.   Poor intake x 1 week continues to take high doses of diuretics.  Suspect possible electrolyte abnormality, hx of prior electrolyte derangement.  Plan labs, IV fluid bolus due to hypotension, EKG   Differential Diagnosis:   Differential diagnosis includes, but is not limited to:  dehydration, electrolyte abnormality, peripheral vertigo, inner ear disease, vestibular neuritis, migraine headache, meniere disease, vestibular tumor, CVA, carotid disease or dissection, orthostatic hypotension, anemia, arrhythmia, myocardial ischemia    Clinical Tests:   Lab Tests: Ordered and Reviewed  Radiological Study: Ordered and Reviewed  Medical Tests: Ordered and Reviewed  ED Management:  Patient has severe hyponatremia in her labs. She has a history of previous hyponatremia; her last episode of significant hyponatremia was in July 2018. Her baseline sodium is ~129. Will plan to admit for treatment of hyponatremia. She was previously successfully treated with tolvaptan and she may require recurrent therapy. Patient also has a history of SI, AH/VH, and heart failure.     On reassessment, patient also reports that she has had trouble swallowing for about one week and consequently has not been  tolerating solids for the past couple days and is now having more trouble with liquids as well. She states she is now coughing and gagging when attempting to eat. She is on 1000 mL fluid restriction per day.             Scribe Attestation:   Scribe #1: I performed the above scribed service and the documentation accurately describes the services I performed. I attest to the accuracy of the note.    Attending Attestation:           Physician Attestation for Scribe:  Physician Attestation Statement for Scribe #1: I, Dr. Rao, reviewed documentation, as scribed by Saúl Funez in my presence, and it is both accurate and complete.                    Clinical Impression:     1. Hyponatremia    2. Dizziness    3. Chest pain    4. Severe obesity (BMI 35.0-39.9) with comorbidity                             Skylar Rao MD  03/07/19 0700

## 2019-03-07 LAB
ANION GAP SERPL CALC-SCNC: 11 MMOL/L
ANION GAP SERPL CALC-SCNC: 12 MMOL/L
ANION GAP SERPL CALC-SCNC: 14 MMOL/L
ANION GAP SERPL CALC-SCNC: 8 MMOL/L
ANION GAP SERPL CALC-SCNC: 9 MMOL/L
BASOPHILS # BLD AUTO: 0.01 K/UL
BASOPHILS NFR BLD: 0.1 %
BUN SERPL-MCNC: 48 MG/DL
BUN SERPL-MCNC: 49 MG/DL
BUN SERPL-MCNC: 50 MG/DL
BUN SERPL-MCNC: 51 MG/DL
CALCIUM SERPL-MCNC: 8.3 MG/DL
CALCIUM SERPL-MCNC: 8.5 MG/DL
CALCIUM SERPL-MCNC: 8.6 MG/DL
CALCIUM SERPL-MCNC: 8.7 MG/DL
CALCIUM SERPL-MCNC: 8.8 MG/DL
CALCIUM SERPL-MCNC: 8.9 MG/DL
CALCIUM SERPL-MCNC: 8.9 MG/DL
CHLORIDE SERPL-SCNC: 76 MMOL/L
CHLORIDE SERPL-SCNC: 76 MMOL/L
CHLORIDE SERPL-SCNC: 78 MMOL/L
CHLORIDE SERPL-SCNC: 79 MMOL/L
CHLORIDE SERPL-SCNC: 79 MMOL/L
CHLORIDE SERPL-SCNC: 83 MMOL/L
CHLORIDE SERPL-SCNC: 85 MMOL/L
CO2 SERPL-SCNC: 26 MMOL/L
CO2 SERPL-SCNC: 27 MMOL/L
CO2 SERPL-SCNC: 28 MMOL/L
CO2 SERPL-SCNC: 28 MMOL/L
CO2 SERPL-SCNC: 29 MMOL/L
CREAT SERPL-MCNC: 1.1 MG/DL
CREAT SERPL-MCNC: 1.2 MG/DL
CREAT SERPL-MCNC: 1.2 MG/DL
CREAT SERPL-MCNC: 1.3 MG/DL
DIFFERENTIAL METHOD: ABNORMAL
EOSINOPHIL # BLD AUTO: 0.1 K/UL
EOSINOPHIL NFR BLD: 0.5 %
ERYTHROCYTE [DISTWIDTH] IN BLOOD BY AUTOMATED COUNT: 14.6 %
EST. GFR  (AFRICAN AMERICAN): 48 ML/MIN/1.73 M^2
EST. GFR  (AFRICAN AMERICAN): 53 ML/MIN/1.73 M^2
EST. GFR  (AFRICAN AMERICAN): 53 ML/MIN/1.73 M^2
EST. GFR  (AFRICAN AMERICAN): 59 ML/MIN/1.73 M^2
EST. GFR  (NON AFRICAN AMERICAN): 42 ML/MIN/1.73 M^2
EST. GFR  (NON AFRICAN AMERICAN): 46 ML/MIN/1.73 M^2
EST. GFR  (NON AFRICAN AMERICAN): 46 ML/MIN/1.73 M^2
EST. GFR  (NON AFRICAN AMERICAN): 51 ML/MIN/1.73 M^2
GLUCOSE SERPL-MCNC: 100 MG/DL
GLUCOSE SERPL-MCNC: 102 MG/DL
GLUCOSE SERPL-MCNC: 102 MG/DL
GLUCOSE SERPL-MCNC: 104 MG/DL
GLUCOSE SERPL-MCNC: 107 MG/DL
GLUCOSE SERPL-MCNC: 115 MG/DL
GLUCOSE SERPL-MCNC: 149 MG/DL
HCT VFR BLD AUTO: 30.2 %
HGB BLD-MCNC: 10.7 G/DL
LYMPHOCYTES # BLD AUTO: 1.4 K/UL
LYMPHOCYTES NFR BLD: 12.5 %
MAGNESIUM SERPL-MCNC: 2.4 MG/DL
MCH RBC QN AUTO: 29.2 PG
MCHC RBC AUTO-ENTMCNC: 35.4 G/DL
MCV RBC AUTO: 82 FL
MONOCYTES # BLD AUTO: 0.7 K/UL
MONOCYTES NFR BLD: 6.3 %
NEUTROPHILS # BLD AUTO: 8.8 K/UL
NEUTROPHILS NFR BLD: 80.3 %
OSMOLALITY UR: 211 MOSM/KG
PHOSPHATE SERPL-MCNC: 2.7 MG/DL
PLATELET # BLD AUTO: 277 K/UL
PMV BLD AUTO: 9.2 FL
POTASSIUM SERPL-SCNC: 2.2 MMOL/L
POTASSIUM SERPL-SCNC: 2.4 MMOL/L
POTASSIUM SERPL-SCNC: 2.4 MMOL/L
POTASSIUM SERPL-SCNC: 3.3 MMOL/L
POTASSIUM SERPL-SCNC: 3.3 MMOL/L
POTASSIUM SERPL-SCNC: 3.4 MMOL/L
POTASSIUM SERPL-SCNC: 3.8 MMOL/L
RBC # BLD AUTO: 3.67 M/UL
SODIUM SERPL-SCNC: 117 MMOL/L
SODIUM SERPL-SCNC: 117 MMOL/L
SODIUM SERPL-SCNC: 118 MMOL/L
SODIUM SERPL-SCNC: 119 MMOL/L
SODIUM SERPL-SCNC: 121 MMOL/L
SODIUM UR-SCNC: 45 MMOL/L
URATE SERPL-MCNC: 16.8 MG/DL
WBC # BLD AUTO: 10.95 K/UL

## 2019-03-07 PROCEDURE — 20000000 HC ICU ROOM

## 2019-03-07 PROCEDURE — 84300 ASSAY OF URINE SODIUM: CPT

## 2019-03-07 PROCEDURE — 83935 ASSAY OF URINE OSMOLALITY: CPT

## 2019-03-07 PROCEDURE — 63600175 PHARM REV CODE 636 W HCPCS: Performed by: PHYSICIAN ASSISTANT

## 2019-03-07 PROCEDURE — 92610 EVALUATE SWALLOWING FUNCTION: CPT

## 2019-03-07 PROCEDURE — 25000242 PHARM REV CODE 250 ALT 637 W/ HCPCS: Performed by: PHYSICIAN ASSISTANT

## 2019-03-07 PROCEDURE — 25000003 PHARM REV CODE 250: Performed by: NURSE PRACTITIONER

## 2019-03-07 PROCEDURE — 25000003 PHARM REV CODE 250: Performed by: PHYSICIAN ASSISTANT

## 2019-03-07 PROCEDURE — 27000221 HC OXYGEN, UP TO 24 HOURS

## 2019-03-07 PROCEDURE — 94640 AIRWAY INHALATION TREATMENT: CPT

## 2019-03-07 PROCEDURE — 99900035 HC TECH TIME PER 15 MIN (STAT)

## 2019-03-07 PROCEDURE — 85025 COMPLETE CBC W/AUTO DIFF WBC: CPT

## 2019-03-07 PROCEDURE — 99291 CRITICAL CARE FIRST HOUR: CPT | Mod: ,,, | Performed by: INTERNAL MEDICINE

## 2019-03-07 PROCEDURE — 36415 COLL VENOUS BLD VENIPUNCTURE: CPT

## 2019-03-07 PROCEDURE — 94761 N-INVAS EAR/PLS OXIMETRY MLT: CPT

## 2019-03-07 PROCEDURE — 84100 ASSAY OF PHOSPHORUS: CPT

## 2019-03-07 PROCEDURE — 80048 BASIC METABOLIC PNL TOTAL CA: CPT | Mod: 91

## 2019-03-07 PROCEDURE — 84550 ASSAY OF BLOOD/URIC ACID: CPT

## 2019-03-07 PROCEDURE — 25000003 PHARM REV CODE 250: Performed by: INTERNAL MEDICINE

## 2019-03-07 PROCEDURE — 83735 ASSAY OF MAGNESIUM: CPT

## 2019-03-07 PROCEDURE — 63600175 PHARM REV CODE 636 W HCPCS: Performed by: NURSE PRACTITIONER

## 2019-03-07 PROCEDURE — 25000003 PHARM REV CODE 250: Performed by: EMERGENCY MEDICINE

## 2019-03-07 PROCEDURE — 63600175 PHARM REV CODE 636 W HCPCS: Performed by: INTERNAL MEDICINE

## 2019-03-07 PROCEDURE — 99291 PR CRITICAL CARE, E/M 30-74 MINUTES: ICD-10-PCS | Mod: ,,, | Performed by: INTERNAL MEDICINE

## 2019-03-07 RX ORDER — SODIUM,POTASSIUM PHOSPHATES 280-250MG
2 POWDER IN PACKET (EA) ORAL
Status: DISCONTINUED | OUTPATIENT
Start: 2019-03-07 | End: 2019-03-08

## 2019-03-07 RX ORDER — POTASSIUM CHLORIDE 20 MEQ/15ML
60 SOLUTION ORAL
Status: DISCONTINUED | OUTPATIENT
Start: 2019-03-07 | End: 2019-03-08

## 2019-03-07 RX ORDER — POTASSIUM CHLORIDE 14.9 MG/ML
20 INJECTION INTRAVENOUS ONCE
Status: DISCONTINUED | OUTPATIENT
Start: 2019-03-07 | End: 2019-03-07

## 2019-03-07 RX ORDER — POTASSIUM CHLORIDE 20 MEQ/15ML
40 SOLUTION ORAL
Status: COMPLETED | OUTPATIENT
Start: 2019-03-07 | End: 2019-03-07

## 2019-03-07 RX ORDER — POTASSIUM CHLORIDE 7.45 MG/ML
10 INJECTION INTRAVENOUS
Status: COMPLETED | OUTPATIENT
Start: 2019-03-07 | End: 2019-03-07

## 2019-03-07 RX ORDER — LANOLIN ALCOHOL/MO/W.PET/CERES
800 CREAM (GRAM) TOPICAL
Status: DISCONTINUED | OUTPATIENT
Start: 2019-03-07 | End: 2019-03-08

## 2019-03-07 RX ORDER — POTASSIUM CHLORIDE 20 MEQ/15ML
40 SOLUTION ORAL
Status: DISCONTINUED | OUTPATIENT
Start: 2019-03-07 | End: 2019-03-08

## 2019-03-07 RX ORDER — FAMOTIDINE 20 MG/1
20 TABLET, FILM COATED ORAL 2 TIMES DAILY
Status: DISCONTINUED | OUTPATIENT
Start: 2019-03-07 | End: 2019-03-09 | Stop reason: HOSPADM

## 2019-03-07 RX ORDER — ENOXAPARIN SODIUM 100 MG/ML
40 INJECTION SUBCUTANEOUS EVERY 24 HOURS
Status: DISCONTINUED | OUTPATIENT
Start: 2019-03-07 | End: 2019-03-09 | Stop reason: HOSPADM

## 2019-03-07 RX ORDER — HYDROXYZINE HYDROCHLORIDE 25 MG/1
25 TABLET, FILM COATED ORAL 3 TIMES DAILY PRN
Status: DISCONTINUED | OUTPATIENT
Start: 2019-03-07 | End: 2019-03-09 | Stop reason: HOSPADM

## 2019-03-07 RX ADMIN — LOSARTAN POTASSIUM 25 MG: 25 TABLET ORAL at 08:03

## 2019-03-07 RX ADMIN — SODIUM CHLORIDE: 0.9 INJECTION, SOLUTION INTRAVENOUS at 12:03

## 2019-03-07 RX ADMIN — SODIUM CHLORIDE 6 MG: 9 INJECTION, SOLUTION INTRAVENOUS at 11:03

## 2019-03-07 RX ADMIN — SODIUM CHLORIDE TAB 1 GM 1 G: 1 TAB at 08:03

## 2019-03-07 RX ADMIN — HEPARIN SODIUM 5000 UNITS: 5000 INJECTION, SOLUTION INTRAVENOUS; SUBCUTANEOUS at 12:03

## 2019-03-07 RX ADMIN — POTASSIUM CHLORIDE 40 MEQ: 40 SOLUTION ORAL at 11:03

## 2019-03-07 RX ADMIN — POTASSIUM BICARBONATE 50 MEQ: 25 TABLET, EFFERVESCENT ORAL at 05:03

## 2019-03-07 RX ADMIN — HEPARIN SODIUM 5000 UNITS: 5000 INJECTION, SOLUTION INTRAVENOUS; SUBCUTANEOUS at 08:03

## 2019-03-07 RX ADMIN — POTASSIUM CHLORIDE 10 MEQ: 10 INJECTION, SOLUTION INTRAVENOUS at 05:03

## 2019-03-07 RX ADMIN — ATORVASTATIN CALCIUM 40 MG: 20 TABLET, FILM COATED ORAL at 08:03

## 2019-03-07 RX ADMIN — FAMOTIDINE 20 MG: 20 TABLET, FILM COATED ORAL at 09:03

## 2019-03-07 RX ADMIN — POTASSIUM CHLORIDE 10 MEQ: 10 INJECTION, SOLUTION INTRAVENOUS at 06:03

## 2019-03-07 RX ADMIN — ENOXAPARIN SODIUM 40 MG: 100 INJECTION SUBCUTANEOUS at 05:03

## 2019-03-07 RX ADMIN — FLUTICASONE FUROATE AND VILANTEROL TRIFENATATE 1 PUFF: 100; 25 POWDER RESPIRATORY (INHALATION) at 08:03

## 2019-03-07 RX ADMIN — ASPIRIN 81 MG: 81 TABLET, COATED ORAL at 08:03

## 2019-03-07 RX ADMIN — TIOTROPIUM BROMIDE 18 MCG: 18 CAPSULE ORAL; RESPIRATORY (INHALATION) at 08:03

## 2019-03-07 RX ADMIN — POTASSIUM CHLORIDE 40 MEQ: 40 SOLUTION ORAL at 09:03

## 2019-03-07 RX ADMIN — FAMOTIDINE 20 MG: 20 TABLET, FILM COATED ORAL at 08:03

## 2019-03-07 NOTE — PLAN OF CARE
SW met with pt at bedside to complete discharge assessment, verified PCP and uses [facility].  Pt would like to add Mars George, friend as a contact [phone]. Pt has TTB, oxygen and rollator.  Pt will use a Lyft or Uber to transport home.  Pt stated she may need a light weight WC.  SW called [phone], spoke to [facility], will cover a light weight WC, but order must state pt can't propel a regular WC.  Pt would like to think about which WC is most appropriate or would like, regular or light weight.     03/07/19 1611   Discharge Assessment   Assessment Type Discharge Planning Assessment   Confirmed/corrected address and phone number on facesheet? Yes   Assessment information obtained from? Patient   Communicated expected length of stay with patient/caregiver no   Prior to hospitilization cognitive status: Alert/Oriented   Prior to hospitalization functional status: Independent;Assistive Equipment   Current cognitive status: Alert/Oriented   Current Functional Status: Assistive Equipment;Independent   Lives With alone   Able to Return to Prior Arrangements yes   Is patient able to care for self after discharge? Unable to determine at this time (comments)   Readmission Within the Last 30 Days no previous admission in last 30 days   Patient currently being followed by outpatient case management? No   Patient currently receives any other outside agency services? Yes  ([facility])   Is it the patient/care giver preference to resume care with the current outside agency? Yes   Equipment Currently Used at Home rollator;oxygen;bath bench   Do you have any problems affording any of your prescribed medications? No   Is the patient taking medications as prescribed? yes   Does the patient have transportation home? Yes   Transportation Anticipated public transportation  (Lyft or Uber)   Does the patient receive services at the [facility]? No   Discharge Plan A Home Health   DME Needed Upon Discharge  none    Patient/Family in Agreement with Plan yes

## 2019-03-07 NOTE — CONSULTS
Consult Note  Nephrology    Consult Requested By: AMBER Austin MD  Reason for Consult: Hyponatremia/JONATHAN    SUBJECTIVE:     History of Present Illness:  Patient is a 68 y.o. female presents with pt of worsening dizziness and poor appetite.  Found to be hypotensive with systolic blood pressure in the 90s and mild acute kidney injury with creatinine of 1.5.  Further evaluation discovered acute on chronic hyponatremia with serum sodium of 115.  Given saline boluses and admitted to ICU for monitoring.  Consulted this morning for evaluation.  Patient seen and examined.  Discussed with Dr. Austin at bedside.  Patient known to us from previous admissions with underlying SIADH and has been stable with water restriction.  Over the last few days patient states that she was unable to tolerate much food but was drinking about 6 glasses of water daily.    Extensive medical history as outlined below including mild CKD 3 with baseline creatinine of about 1.1-1.2.  Patient acknowledges that she still smokes.  Denies any recent NSAIDs.  Chronically short of breath and wears oxygen and uses multiple inhalers.  Denies any chest pain, nausea, vomiting, diarrhea, fever, chills.  Appetite improving and able to eat breakfast this morning.    Past Medical History:   Diagnosis Date    Allergy     Anemia due to gastrointestinal blood loss     LESLY from gastric ulcer due to chronic nsaid use    Anxiety     Arthritis     CKD (chronic kidney disease) stage 3, GFR 30-59 ml/min     followed by [facility] study - recieved labs from 2017 and 2/2018    Gastric ulcer     H/O knee surgery 2001    right    Heart failure with preserved ejection fraction 8/20/2018    Hx of psychiatric care     Hyperlipidemia     Hypertension     Hyponatremia     Psychiatric problem     Therapy     Tobacco abuse      Past Surgical History:   Procedure Laterality Date    ANGIOGRAM, CORONARY ARTERY N/A 7/20/2018    Performed by Willard Roberts MD at  [facility] CATH LAB    BREAST CYST EXCISION Right     1967    COLONOSCOPY  2015    ESOPHAGOGASTRODUODENOSCOPY  2015    FRACTURE SURGERY      left femur    JOINT REPLACEMENT  2007    left knee x 2, 2nd performed 2/2 to MRSA infection 3 months after 1st surgery    ORIF FEMUR FRACTURE Left     TUBAL LIGATION       Family History   Problem Relation Age of Onset    Hypertension Mother     Alzheimer's disease Mother     Dementia Mother     Depression Mother     Myasthenia gravis Father     Arthritis Father     Rectal cancer Father     Hypertension Brother     Arthritis Brother     Colon cancer Brother     No Known Problems Son     Cancer Sister         brain    Melanoma Neg Hx     Breast cancer Neg Hx      Social History     Tobacco Use    Smoking status: Current Every Day Smoker     Packs/day: 1.50    Smokeless tobacco: Never Used   Substance Use Topics    Alcohol use: Yes     Alcohol/week: 4.2 oz     Types: 7 Shots of liquor per week     Comment: at least 1 cocktail a day    Drug use: No       Review of patient's allergies indicates:   Allergen Reactions    Wellbutrin [bupropion hcl] Other (See Comments)     Hyponatremia and hypomagnesia     Zoloft [sertraline] Other (See Comments)     Hyponatremia and hypomagnesia         Review of Systems:  Constitutional: No fever or chills  Respiratory:  shortness of breath  Cardiovascular: No chest pain or palpitations  Gastrointestinal: No nausea or vomiting  Neurological: No confusion or weakness    OBJECTIVE:     Vital Signs (Most Recent)  Temp: 98.1 °F (36.7 °C) (03/07/19 0705)  Pulse: 61 (03/07/19 0845)  Resp: 18 (03/07/19 0845)  BP: (!) 102/55 (03/07/19 0830)  SpO2: 96 % (03/07/19 0845)    Vital Signs Range (Last 24H):  Temp:  [97.6 °F (36.4 °C)-98.1 °F (36.7 °C)]   Pulse:  [56-68]   Resp:  [12-33]   BP: ()/(46-86)   SpO2:  [89 %-100 %]       Intake/Output Summary (Last 24 hours) at 3/7/2019 0905  Last data filed at 3/7/2019 0600  Gross per 24 hour    Intake 850 ml   Output 1175 ml   Net -325 ml       Physical Exam:  General appearance: Well developed, well nourished  Eyes:  Conjunctivae/corneas clear. PERRL.  Lungs: Normal respiratory effort,   chronic fine crackles  Heart: Regular/Mikel rate and rhythm, S1, S2 normal, no murmur, rub or dionte.  Abdomen: Soft, non-tender non-distended; bowel sounds normal; no masses,  no organomegaly, obese  Extremities: No cyanosis or clubbing. trace edema but chronic Woody legs.    Skin: Skin color, texture, turgor normal. No rashes or lesions  Neurologic: Normal strength and tone. No focal numbness or weakness       Laboratory:  Recent Labs   Lab 03/07/19 0411   WBC 10.95   RBC 3.67*   HGB 10.7*   HCT 30.2*      MCV 82   MCH 29.2   MCHC 35.4     BMP:   Recent Labs   Lab 03/07/19 0411      *   K 2.2*   CL 78*   CO2 28   BUN 48*   CREATININE 1.1   CALCIUM 8.7   MG 2.4     Lab Results   Component Value Date    CALCIUM 8.7 03/07/2019    PHOS 3.8 12/15/2018     BNP  Recent Labs   Lab 03/06/19  1559   BNP 87     Lab Results   Component Value Date    URICACID 8.3 (H) 07/14/2018     Lab Results   Component Value Date    IRON 33 07/15/2018    TIBC 324 07/15/2018    FERRITIN 94 07/15/2018     Lab Results   Component Value Date    CALCIUM 8.7 03/07/2019    PHOS 3.8 12/15/2018       Diagnostic Results:  CT Head Without Contrast   Final Result      No CT evidence of territorial infarction.  No acute intracranial process.  MRI of the brain may be attempted for further evaluation.      Paranasal sinus disease.         Electronically signed by: Jatin Milligan MD   Date:    03/06/2019   Time:    20:23          ASSESSMENT/PLAN:     1. Acute on chronic hyponatremia (E87.1):  History of mild SIADH and has been stable with water restriction.  Has receives doses of tolvaptan over previous hospitalizations.  However this current hyponatremia is likely from volume depletion as her urine osmolality is mostly dilute, urine sodium  is low, hypotensive, and poor intake over last few days.  No need for Samsca at this time.  Will continue with gentle isotonic saline and use salt tablets with pure water restriction.  Sodium goal about 123.  Continue to monitor q.4 hours.  2. Resolved nonoliguric JONATHAN on CKD III from volume depletion (N17.9, N18.3):  Creat back to baseline.  Renally dose meds, avoid nephrotoxins, and monitor I/O's closely.  3. Vertigo likely from volume depletion (R42):  Seems mostly resolved.   4. Hypokalemia with nl Mag (E87.6):  Replace aggressively PO/IV.  Monitor trends.   5. Severe COPD (J44.9):  Still smokes....      Thanks for consult  See above  Will follow along.     Addendum:    Severe Hyperuricemia:  -need to protect against urate nephropathy  -dose elitek now.

## 2019-03-07 NOTE — ED NOTES
Pt lying in bed, respirations even, unlabored, eyes open spontaneously, NAD noted, call bell with within reach. Pt given water per pt request, pt updated on plan of care, will continue to monitor with BP cycling, pulse ox, and cardiac monitoring

## 2019-03-07 NOTE — ASSESSMENT & PLAN NOTE
- Baseline anemia in 9s-10s; appears to have been hemoconcentrated on admission.  - Continue to monitor.

## 2019-03-07 NOTE — PLAN OF CARE
Problem: SLP Goal  Goal: SLP Goal  1. Pt will be able to consume a regular diet with thin liquids with no signs of airway threat, aspiration, difficulty initiating a swallow or sensation of residuals with min assistance to follow through on compensatory strategies  Outcome: Ongoing (interventions implemented as appropriate)  Pt with report of instances of being unable to swallow over the last 2-3 weeks and poor appetite. Pt seen this date for bedside swallow evaluation    Comments: Speech to follow up 2x/week

## 2019-03-07 NOTE — ASSESSMENT & PLAN NOTE
- Ms. Nalini Varma is admitted to inpatient status   - sodium was 115 upon ED evaluation   - upon arrival to the ICU, it appears the patient has been receiving IV NS at rate of 125cc/hour  - will await repeat metabolic panel and continue to monitor hourly until serum sodium increased by 4-6 mmol/L per Uptodate rec's  - Nephrology consulted

## 2019-03-07 NOTE — PROGRESS NOTES
"[facility] Hospital Medicine  Progress Note    Patient Name: Nalini Varma  MRN: [id number]  Patient Class: IP- Inpatient   Admission Date: 3/6/2019  Length of Stay: 1 days  Attending Physician: AMBER Austin MD  Primary Care Provider: Yane Sahni MD        Subjective:     Principal Problem:Hyponatremia    HPI:  Ms. Nalini Varma is a 68 y.o. female, with PMH of SIADH, HTN, CHF, COPD, anemia, HLD, chronic respiratory failrue (on home O2), and anxiety, who presented to [facility] ED on 3/6/19 2/2 dizziness. She states this has been ongoing for "a few days" which worsened on the morning of 3/6/19. She states this is not a spinning sensation, but a light headed sensation. She states it is worse with movement, and after standing. Associated symptoms include nausea and decreased appetite, and generalized abdominal pain, decreased urination. She denies vomiting, diarrhea, constipation, dysuria, SOB, chest pain, fever, cough, congestion. She states she started taking losartan two weeks ago. She was evaluated in the ED, and found to have hyponatremia of 115, and low chloride, as well as JONATHAN. She was admitted to inpatient status to the ICU.    Hospital Course:  No notes on file    Interval History: No acute events since admission. Still with some dizziness; otherwise slightly improved.    Review of Systems   Constitutional: Negative for chills and fever.   Respiratory: Negative for cough and shortness of breath.    Cardiovascular: Negative for chest pain and palpitations.   Gastrointestinal: Negative for abdominal pain, nausea and vomiting.   Neurological: Positive for dizziness.     Objective:     Vital Signs (Most Recent):  Temp: 97.9 °F (36.6 °C) (03/07/19 0315)  Pulse: 61 (03/07/19 0845)  Resp: 18 (03/07/19 0845)  BP: (!) 114/57 (03/07/19 0600)  SpO2: 96 % (03/07/19 0845) Vital Signs (24h Range):  Temp:  [97.6 °F (36.4 °C)-97.9 °F (36.6 °C)] 97.9 °F (36.6 °C)  Pulse:  [56-68] " 61  Resp:  [12-33] 18  SpO2:  [92 %-100 %] 96 %  BP: ()/(46-86) 114/57     Weight: 102.8 kg (226 lb 10.1 oz)  Body mass index is 36.58 kg/m².    Intake/Output Summary (Last 24 hours) at 3/7/2019 0852  Last data filed at 3/7/2019 0600  Gross per 24 hour   Intake 850 ml   Output 1175 ml   Net -325 ml      Physical Exam   Constitutional: She is oriented to person, place, and time. She appears well-developed. No distress.   Obese.   HENT:   Head: Normocephalic and atraumatic.   Eyes: Conjunctivae and EOM are normal.   Cardiovascular: Normal rate, regular rhythm, S1 normal, S2 normal and intact distal pulses.   Pulmonary/Chest: Effort normal. No respiratory distress.   Abdominal: Soft. She exhibits no distension. There is no tenderness.   Musculoskeletal: Normal range of motion. She exhibits no edema.   Neurological: She is alert and oriented to person, place, and time.   Skin: Skin is warm and dry.   Nursing note and vitals reviewed.    Significant Labs:   CBC:  Recent Labs   Lab 03/06/19  1559 03/07/19  0411   WBC 9.89 10.95   HGB 12.2 10.7*   HCT 34.1* 30.2*    277   GRAN 81.5*  8.1* 80.3*  8.8*   LYMPH 9.9*  1.0 12.5*  1.4   MONO 7.8  0.8 6.3  0.7   EOS 0.0 0.1   BASO 0.01 0.01     BMP:  Recent Labs   Lab 03/06/19  1559 03/07/19  0015 03/07/19  0411   * 117*  117* 118*   K 3.5 2.4*  2.4* 2.2*   CL 74* 76*  76* 78*   CO2 29 27  27 28   BUN 49* 48*  48* 48*   CREATININE 1.5* 1.3  1.3 1.1   * 102  102 100   CALCIUM 9.4 8.9  8.9 8.7   MG 3.7*  --  2.4     Significant Imaging:   No new imaging this morning.    Assessment/Plan:      * Hyponatremia    - Hyponatremia with underlying SIADH; prior nephrology eval had recommended fluid restriction to 1L in past, as of 09/2018 had returned to normonatremic, however.  - Continuing IVFs with NS; potentially add sodium supplementation.  - Goal sodium ~121mEq for first 24hr.  - Appreciate nephrology assistance.     Heart failure with preserved  ejection fraction    - Last 2D Echo 12/2018 EF 65%, diastolic dysfunction.  - Does not appear volume overloaded currently.  - Holding medications that could affect BP for time being, resume as feasible.  - Hold home torsemide.     Chronic respiratory failure with hypoxia    - Reported use of 3L O2 via NC at home with underlying COPD.  - Continue supplemental O2 as needed.     MARY (generalized anxiety disorder)    - Continuing hydroxyzine 25mg PO TID PRN.     Hyperlipidemia    - Continuing atorvastatin 40mg PO daily.     Tobacco dependence    - Cessation counseling.     Anemia    - Baseline anemia in 9s-10s; appears to have been hemoconcentrated on admission.  - Continue to monitor.     Hypertension, benign    - Hold antihypertensives for time being given low-normal BPs.     Chronic obstructive pulmonary disease    - Continuing fluticasone-vilanterol 100-25mcg inhaled daily, albuterol 2 puff inhaled q6hr PRN, tiotropium 18mcg inhaled daily.       VTE Risk Mitigation (From admission, onward)        Ordered     IP VTE HIGH RISK PATIENT  Once      03/06/19 2359     heparin (porcine) injection 5,000 Units  Every 12 hours      03/06/19 2305     Place sequential compression device  Until discontinued      03/06/19 2305        Critical care time spent on the evaluation and treatment of severe organ dysfunction, review of pertinent labs and imaging studies, discussions with consulting providers and discussions with patient/family: 40 minutes.    AMBER Krueger MD  Department of Hospital Medicine   [facility]  [phone]

## 2019-03-07 NOTE — ASSESSMENT & PLAN NOTE
- Reported use of 3L O2 via NC at home with underlying COPD.  - Continue supplemental O2 as needed.

## 2019-03-07 NOTE — ASSESSMENT & PLAN NOTE
- last ECHO 12/11/18:  · Normal left ventricular systolic function. The estimated ejection fraction is 65%  · Indeterminate left ventricular diastolic function.  · No wall motion abnormalities.  · Normal right ventricular systolic function.  · Moderate left atrial enlargement.  · Normal central venous pressure (3 mm Hg).  · Trace tricuspid regurgitation.  · Trace aortic regurgitation.  · Aortic valve not well seen. increase gradient acorss the valve related to increase LVOT flow, there is no stenosis by doppler.   - hold torsemide now as patient is hyponatremic and suspect this could partially be 2/2 known major side effects of torsemide (hyponatremia, and elevated BUN & Cr)  - daily weights, strict I&O's

## 2019-03-07 NOTE — ASSESSMENT & PLAN NOTE
- most recent blood pressure reading BP: (!) 121/58   - continue home meds  - hydralazine PRN for SBP > 170  - monitor

## 2019-03-07 NOTE — ASSESSMENT & PLAN NOTE
- Last 2D Echo 12/2018 EF 65%, diastolic dysfunction.  - Does not appear volume overloaded currently.  - Holding medications that could affect BP for time being, resume as feasible.  - Hold home torsemide.

## 2019-03-07 NOTE — ASSESSMENT & PLAN NOTE
- Continuing fluticasone-vilanterol 100-25mcg inhaled daily, albuterol 2 puff inhaled q6hr PRN, tiotropium 18mcg inhaled daily.

## 2019-03-07 NOTE — ED NOTES
"PT states "when am I going to get a bed, where are my nightly meds? My torsemide? Coreg? Clonidine?" hospitalist notified of pts nightly medication requests  "

## 2019-03-07 NOTE — SUBJECTIVE & OBJECTIVE
Interval History: No acute events since admission. Still with some dizziness; otherwise slightly improved.    Review of Systems   Constitutional: Negative for chills and fever.   Respiratory: Negative for cough and shortness of breath.    Cardiovascular: Negative for chest pain and palpitations.   Gastrointestinal: Negative for abdominal pain, nausea and vomiting.   Neurological: Positive for dizziness.     Objective:     Vital Signs (Most Recent):  Temp: 97.9 °F (36.6 °C) (03/07/19 0315)  Pulse: 61 (03/07/19 0845)  Resp: 18 (03/07/19 0845)  BP: (!) 114/57 (03/07/19 0600)  SpO2: 96 % (03/07/19 0845) Vital Signs (24h Range):  Temp:  [97.6 °F (36.4 °C)-97.9 °F (36.6 °C)] 97.9 °F (36.6 °C)  Pulse:  [56-68] 61  Resp:  [12-33] 18  SpO2:  [92 %-100 %] 96 %  BP: ()/(46-86) 114/57     Weight: 102.8 kg (226 lb 10.1 oz)  Body mass index is 36.58 kg/m².    Intake/Output Summary (Last 24 hours) at 3/7/2019 0852  Last data filed at 3/7/2019 0600  Gross per 24 hour   Intake 850 ml   Output 1175 ml   Net -325 ml      Physical Exam   Constitutional: She is oriented to person, place, and time. She appears well-developed. No distress.   Obese.   HENT:   Head: Normocephalic and atraumatic.   Eyes: Conjunctivae and EOM are normal.   Cardiovascular: Normal rate, regular rhythm, S1 normal, S2 normal and intact distal pulses.   Pulmonary/Chest: Effort normal. No respiratory distress.   Abdominal: Soft. She exhibits no distension. There is no tenderness.   Musculoskeletal: Normal range of motion. She exhibits no edema.   Neurological: She is alert and oriented to person, place, and time.   Skin: Skin is warm and dry.   Nursing note and vitals reviewed.    Significant Labs:   CBC:  Recent Labs   Lab 03/06/19  1559 03/07/19  0411   WBC 9.89 10.95   HGB 12.2 10.7*   HCT 34.1* 30.2*    277   GRAN 81.5*  8.1* 80.3*  8.8*   LYMPH 9.9*  1.0 12.5*  1.4   MONO 7.8  0.8 6.3  0.7   EOS 0.0 0.1   BASO 0.01 0.01     BMP:  Recent Labs    Lab 03/06/19  1559 03/07/19  0015 03/07/19  0411   * 117*  117* 118*   K 3.5 2.4*  2.4* 2.2*   CL 74* 76*  76* 78*   CO2 29 27  27 28   BUN 49* 48*  48* 48*   CREATININE 1.5* 1.3  1.3 1.1   * 102  102 100   CALCIUM 9.4 8.9  8.9 8.7   MG 3.7*  --  2.4     Significant Imaging:   No new imaging this morning.

## 2019-03-07 NOTE — HPI
"Ms. Nalini Varma is a 68 y.o. female, with PMH of SIADH, HTN, CHF, COPD, anemia, HLD, chronic respiratory failrue (on home O2), and anxiety, who presented to [facility] ED on 3/6/19 2/2 dizziness. She states this has been ongoing for "a few days" which worsened on the morning of 3/6/19. She states this is not a spinning sensation, but a light headed sensation. She states it is worse with movement, and after standing. Associated symptoms include nausea and decreased appetite, and generalized abdominal pain, decreased urination. She denies vomiting, diarrhea, constipation, dysuria, SOB, chest pain, fever, cough, congestion. She states she started taking losartan two weeks ago. She was evaluated in the ED, and found to have hyponatremia of 115, and low chloride, as well as JONATHAN. She was admitted to inpatient status to the ICU.  "

## 2019-03-07 NOTE — NURSING
"Pt  Informed again of need for a second PIV.  Pt refusing to allow staff to place a second PIV stating that "the second one is never used".  No acute distress noted.  Will continue to monitor.  "

## 2019-03-07 NOTE — ASSESSMENT & PLAN NOTE
- Hyponatremia with underlying SIADH; prior nephrology eval had recommended fluid restriction to 1L in past, as of 09/2018 had returned to normonatremic, however.  - Continuing IVFs with NS; potentially add sodium supplementation.  - Goal sodium ~121mEq for first 24hr.  - Appreciate nephrology assistance.

## 2019-03-07 NOTE — SUBJECTIVE & OBJECTIVE
Past Medical History:   Diagnosis Date    Allergy     Anemia due to gastrointestinal blood loss     LESLY from gastric ulcer due to chronic nsaid use    Anxiety     Arthritis     CKD (chronic kidney disease) stage 3, GFR 30-59 ml/min     followed by [facility] study - recieved labs from 2017 and 2/2018    Gastric ulcer     H/O knee surgery 2001    right    Heart failure with preserved ejection fraction 8/20/2018    Hx of psychiatric care     Hyperlipidemia     Hypertension     Hyponatremia     Psychiatric problem     Therapy     Tobacco abuse        Past Surgical History:   Procedure Laterality Date    ANGIOGRAM, CORONARY ARTERY N/A 7/20/2018    Performed by [facility]ard [facility] at [facility] CATH LAB    BREAST CYST EXCISION Right     1967    COLONOSCOPY  2015    ESOPHAGOGASTRODUODENOSCOPY  2015    FRACTURE SURGERY      left femur    JOINT REPLACEMENT  2007    left knee x 2, 2nd performed 2/2 to MRSA infection 3 months after 1st surgery    ORIF FEMUR FRACTURE Left     TUBAL LIGATION         Review of patient's allergies indicates:   Allergen Reactions    Wellbutrin [bupropion hcl] Other (See Comments)     Hyponatremia and hypomagnesia     Zoloft [sertraline] Other (See Comments)     Hyponatremia and hypomagnesia        No current facility-administered medications on file prior to encounter.      Current Outpatient Medications on File Prior to Encounter   Medication Sig    albuterol (VENTOLIN HFA) 90 mcg/actuation inhaler inhale 1-2 puffs as needed every 6 hours for wheezing and shortness of breath    amLODIPine (NORVASC) 10 MG tablet Take 1 tablet (10 mg total) by mouth once daily.    aspirin (ECOTRIN) 81 MG EC tablet Take 81 mg by mouth once daily.    atorvastatin (LIPITOR) 40 MG tablet TAKE 1 TABLET BY MOUTH EVERY DAILY    biotin 1 mg Cap Take by mouth.    carvedilol (COREG) 6.25 MG tablet Take 1 tablet (6.25 mg total) by mouth 2 (two) times daily.    cloNIDine (CATAPRES) 0.1 MG tablet  Take 1 tablet (0.1 mg total) by mouth every 12 (twelve) hours.    cyanocobalamin 1,000 mcg TbSR Take 1,000 mcg by mouth once daily. (Patient taking differently: Take 1,000 mcg by mouth daily as needed. )    famotidine (PEPCID) 20 MG tablet TAKE 1 TABLET BY MOUTH TWO TIMES A DAY    fluticasone (FLONASE) 50 mcg/actuation nasal spray 1 spray by Each Nare route 2 (two) times daily as needed for Rhinitis.    fluticasone-salmeterol (ADVAIR HFA) 115-21 mcg/actuation HFAA Inhale 2 puffs into the lungs every 12 (twelve) hours.    guaiFENesin (MUCINEX) 600 mg 12 hr tablet Take 1,200 mg by mouth 2 (two) times daily as needed for Congestion.    HYDROcodone-acetaminophen (NORCO)  mg per tablet Take 1 tablet by mouth every 12 (twelve) hours as needed for Pain.    loratadine (CLARITIN) 10 mg tablet Take 10 mg by mouth once daily.    losartan (COZAAR) 25 MG tablet Take 1 tablet (25 mg total) by mouth once daily.    MAGNESIUM ASCORBATE, BULK, MISC     magnesium oxide 400 mg Cap Take 1 capsule by mouth every 12 (twelve) hours.    torsemide (DEMADEX) 20 MG Tab Take 2 tablets (40 mg total) by mouth 2 (two) times daily.    umeclidinium (INCRUSE ELLIPTA) 62.5 mcg/actuation DsDv Inhale 1 puff into the lungs once daily. Controller    vitamin D 1000 units Tab Take 1,000 Units by mouth once daily.    walker (ULTRA-LIGHT ROLLATOR) Misc 1 each by Misc.(Non-Drug; Combo Route) route once daily.     Family History     Problem Relation (Age of Onset)    Alzheimer's disease Mother    Arthritis Father, Brother    Cancer Sister    Colon cancer Brother    Dementia Mother    Depression Mother    Hypertension Mother, Brother    Myasthenia gravis Father    No Known Problems Son    Rectal cancer Father        Tobacco Use    Smoking status: Current Every Day Smoker     Packs/day: 1.50    Smokeless tobacco: Never Used   Substance and Sexual Activity    Alcohol use: Yes     Alcohol/week: 4.2 oz     Types: 7 Shots of liquor per week      Comment: at least 1 cocktail a day    Drug use: No    Sexual activity: Not Currently     Birth control/protection: Abstinence     Review of Systems   Constitutional: Positive for appetite change (decreased). Negative for chills, diaphoresis and fatigue.   Respiratory: Negative for cough, shortness of breath and wheezing.    Cardiovascular: Negative for chest pain and palpitations.   Gastrointestinal: Positive for nausea. Negative for abdominal pain, constipation, diarrhea and vomiting.   Genitourinary: Negative for dysuria, flank pain, frequency, hematuria and urgency.   Skin: Negative for color change and rash.   Neurological: Positive for dizziness and light-headedness. Negative for syncope, weakness, numbness and headaches.   Psychiatric/Behavioral: Positive for confusion. Negative for decreased concentration.     Objective:     Vital Signs (Most Recent):  Temp: 97.6 °F (36.4 °C) (03/07/19 0000)  Pulse: 62 (03/07/19 0100)  Resp: 20 (03/07/19 0100)  BP: 131/62 (03/07/19 0100)  SpO2: 99 % (03/07/19 0100) Vital Signs (24h Range):  Temp:  [97.6 °F (36.4 °C)] 97.6 °F (36.4 °C)  Pulse:  [56-66] 62  Resp:  [13-20] 20  SpO2:  [93 %-99 %] 99 %  BP: ()/(53-82) 131/62     Weight: 102.8 kg (226 lb 10.1 oz)  Body mass index is 36.58 kg/m².    Physical Exam   Constitutional: She is oriented to person, place, and time. She appears well-developed and well-nourished. No distress.   Obese female. Smells of cigarette smoke.    HENT:   Head: Normocephalic and atraumatic.   Eyes: Conjunctivae and EOM are normal. Pupils are equal, round, and reactive to light. No scleral icterus.   Neck: Normal range of motion. Neck supple. No tracheal deviation present.   Cardiovascular: Normal rate, regular rhythm, normal heart sounds and intact distal pulses. Exam reveals no gallop and no friction rub.   No murmur heard.  Pulmonary/Chest: Effort normal and breath sounds normal. No stridor. No respiratory distress. She has no wheezes. She  has no rales.   Abdominal: Soft. Bowel sounds are normal. She exhibits no distension and no mass. There is no tenderness. There is no guarding.   Neurological: She is alert and oriented to person, place, and time.   Skin: Skin is warm and dry. She is not diaphoretic. No pallor.   Psychiatric: She has a normal mood and affect. Her behavior is normal. Judgment and thought content normal.   Nursing note and vitals reviewed.        CRANIAL NERVES     CN III, IV, VI   Pupils are equal, round, and reactive to light.  Extraocular motions are normal.        Significant Labs:   BMP:   Recent Labs   Lab 03/06/19  1559 03/07/19  0015   * 102  102   * 117*  117*   K 3.5 2.4*  2.4*   CL 74* 76*  76*   CO2 29 27  27   BUN 49* 48*  48*   CREATININE 1.5* 1.3  1.3   CALCIUM 9.4 8.9  8.9   MG 3.7*  --      CBC:   Recent Labs   Lab 03/06/19  1559   WBC 9.89   HGB 12.2   HCT 34.1*        CMP:   Recent Labs   Lab 03/06/19  1559 03/07/19  0015   * 117*  117*   K 3.5 2.4*  2.4*   CL 74* 76*  76*   CO2 29 27  27   * 102  102   BUN 49* 48*  48*   CREATININE 1.5* 1.3  1.3   CALCIUM 9.4 8.9  8.9   PROT 7.0  --    ALBUMIN 3.5  --    BILITOT 0.4  --    ALKPHOS 75  --    AST 23  --    ALT 20  --    ANIONGAP 12 14  14   EGFRNONAA 35* 42*  42*     Urine Culture: No results for input(s): LABURIN in the last 48 hours.  Urine Studies: No results for input(s): COLORU, APPEARANCEUA, PHUR, SPECGRAV, PROTEINUA, GLUCUA, KETONESU, BILIRUBINUA, OCCULTUA, NITRITE, UROBILINOGEN, LEUKOCYTESUR, RBCUA, WBCUA, BACTERIA, SQUAMEPITHEL, HYALINECASTS in the last 48 hours.    Invalid input(s): WRIGHTSUR  All pertinent labs within the past 24 hours have been reviewed.    Significant Imaging: I have reviewed all pertinent imaging results/findings within the past 24 hours.   Imaging Results          CT Head Without Contrast (Final result)  Result time 03/06/19 20:23:34    Final result by Jatin Milligan MD (03/06/19  20:23:34)                 Impression:      No CT evidence of territorial infarction.  No acute intracranial process.  MRI of the brain may be attempted for further evaluation.    Paranasal sinus disease.      Electronically signed by: Jatin Milligan MD  Date:    03/06/2019  Time:    20:23             Narrative:    EXAMINATION:  CT HEAD WITHOUT CONTRAST    CLINICAL HISTORY:  Neuro deficit(s), subacute;    TECHNIQUE:  Low dose axial images were obtained through the head.  Coronal and sagittal reformations were also performed. Contrast was not administered.    COMPARISON:  CT scan of the head dated 08/14/2016.    FINDINGS:  The subcutaneous tissues are unremarkable.  The bony calvarium is intact.  There is mucosal thickening within the left maxillary sinus.  The remainder of the paranasal sinuses are unremarkable.  The mastoid air cells are clear.  There are postoperative changes in the orbits.    The craniocervical junction is within normal limits.  There are no extra-axial fluid collections.  There is no evidence of intracranial hemorrhage.  The ventricles and sulci are prominent, suggestive of cerebral volume loss.  The cisterns are unremarkable.  The gray-white differentiation is maintained.  There is no evidence of mass effect.

## 2019-03-07 NOTE — EICU
New admit    67 y/o F history of HTN, CKD, HFpEF, COPD FEV1 0.83, morbidly obese, presents with dizziness for the past few days that progressively got worse.  This was accompanied by nausea and decreased appetite with mild generalized abdominal pain. New medication was losartan started 2 weeks ago.     She was found to be hyponatremic and hypokalemic likely from decreased PO intake with correction ongoing.     3/7/2019 04:11   Sodium 118 (LL)   Potassium 2.2 (LL)   Chloride 78 (L)   CO2 28   Anion Gap 12   BUN, Bld 48 (H)   Creatinine 1.1   eGFR 51 (A)   Calcium 8.7   Magnesium 2.4     · Continue electrolyte correction

## 2019-03-07 NOTE — H&P
"[facility] Medicine  History & Physical    Patient Name: Nalini Varma  MRN: [id number]  Admission Date: 3/6/2019  Attending Physician: AMBER Austin MD   Primary Care Provider: Yane Sahni MD         Patient information was obtained from patient, past medical records and ER records.     Subjective:     Principal Problem:Hyponatremia    Chief Complaint:   Chief Complaint   Patient presents with    Dizziness     pt to er with weakness and dizziness, and afib.         HPI: Ms. Nalini Varma is a 68 y.o. female, with PMH of SIADH, HTN, CHF, COPD, anemia, HLD, chronic respiratory failrue (on home O2), and anxiety, who presented to [facility] ED on 3/6/19 2/2 dizziness. She states this has been ongoing for "a few days" which worsened on the morning of 3/6/19. She states this is not a spinning sensation, but a light headed sensation. She states it is worse with movement, and after standing. Associated symptoms include nausea and decreased appetite, and generalized abdominal pain, decreased urination. She denies vomiting, diarrhea, constipation, dysuria, SOB, chest pain, fever, cough, congestion. She states she started taking losartan two weeks ago. She was evaluated in the ED, and found to have hyponatremia of 115, and low chloride, as well as JONATHAN. She was admitted to inpatient status to the ICU.    Past Medical History:   Diagnosis Date    Allergy     Anemia due to gastrointestinal blood loss     LESLY from gastric ulcer due to chronic nsaid use    Anxiety     Arthritis     CKD (chronic kidney disease) stage 3, GFR 30-59 ml/min     followed by [facility] study - recieved labs from 2017 and 2/2018    Gastric ulcer     H/O knee surgery 2001    right    Heart failure with preserved ejection fraction 8/20/2018    Hx of psychiatric care     Hyperlipidemia     Hypertension     Hyponatremia     Psychiatric problem     Therapy     Tobacco abuse        Past Surgical " History:   Procedure Laterality Date    ANGIOGRAM, CORONARY ARTERY N/A 7/20/2018    Performed by Willard Roberts MD at [facility] CATH LAB    BREAST CYST EXCISION Right     1967    COLONOSCOPY  2015    ESOPHAGOGASTRODUODENOSCOPY  2015    FRACTURE SURGERY      left femur    JOINT REPLACEMENT  2007    left knee x 2, 2nd performed 2/2 to MRSA infection 3 months after 1st surgery    ORIF FEMUR FRACTURE Left     TUBAL LIGATION         Review of patient's allergies indicates:   Allergen Reactions    Wellbutrin [bupropion hcl] Other (See Comments)     Hyponatremia and hypomagnesia     Zoloft [sertraline] Other (See Comments)     Hyponatremia and hypomagnesia        No current facility-administered medications on file prior to encounter.      Current Outpatient Medications on File Prior to Encounter   Medication Sig    albuterol (VENTOLIN HFA) 90 mcg/actuation inhaler inhale 1-2 puffs as needed every 6 hours for wheezing and shortness of breath    amLODIPine (NORVASC) 10 MG tablet Take 1 tablet (10 mg total) by mouth once daily.    aspirin (ECOTRIN) 81 MG EC tablet Take 81 mg by mouth once daily.    atorvastatin (LIPITOR) 40 MG tablet TAKE 1 TABLET BY MOUTH EVERY DAILY    biotin 1 mg Cap Take by mouth.    carvedilol (COREG) 6.25 MG tablet Take 1 tablet (6.25 mg total) by mouth 2 (two) times daily.    cloNIDine (CATAPRES) 0.1 MG tablet Take 1 tablet (0.1 mg total) by mouth every 12 (twelve) hours.    cyanocobalamin 1,000 mcg TbSR Take 1,000 mcg by mouth once daily. (Patient taking differently: Take 1,000 mcg by mouth daily as needed. )    famotidine (PEPCID) 20 MG tablet TAKE 1 TABLET BY MOUTH TWO TIMES A DAY    fluticasone (FLONASE) 50 mcg/actuation nasal spray 1 spray by Each Nare route 2 (two) times daily as needed for Rhinitis.    fluticasone-salmeterol (ADVAIR HFA) 115-21 mcg/actuation HFAA Inhale 2 puffs into the lungs every 12 (twelve) hours.    guaiFENesin (MUCINEX) 600 mg 12 hr tablet Take 1,200  mg by mouth 2 (two) times daily as needed for Congestion.    HYDROcodone-acetaminophen (NORCO)  mg per tablet Take 1 tablet by mouth every 12 (twelve) hours as needed for Pain.    loratadine (CLARITIN) 10 mg tablet Take 10 mg by mouth once daily.    losartan (COZAAR) 25 MG tablet Take 1 tablet (25 mg total) by mouth once daily.    MAGNESIUM ASCORBATE, BULK, MISC     magnesium oxide 400 mg Cap Take 1 capsule by mouth every 12 (twelve) hours.    torsemide (DEMADEX) 20 MG Tab Take 2 tablets (40 mg total) by mouth 2 (two) times daily.    umeclidinium (INCRUSE ELLIPTA) 62.5 mcg/actuation DsDv Inhale 1 puff into the lungs once daily. Controller    vitamin D 1000 units Tab Take 1,000 Units by mouth once daily.    walker (ULTRA-LIGHT ROLLATOR) Misc 1 each by Misc.(Non-Drug; Combo Route) route once daily.     Family History     Problem Relation (Age of Onset)    Alzheimer's disease Mother    Arthritis Father, Brother    Cancer Sister    Colon cancer Brother    Dementia Mother    Depression Mother    Hypertension Mother, Brother    Myasthenia gravis Father    No Known Problems Son    Rectal cancer Father        Tobacco Use    Smoking status: Current Every Day Smoker     Packs/day: 1.50    Smokeless tobacco: Never Used   Substance and Sexual Activity    Alcohol use: Yes     Alcohol/week: 4.2 oz     Types: 7 Shots of liquor per week     Comment: at least 1 cocktail a day    Drug use: No    Sexual activity: Not Currently     Birth control/protection: Abstinence     Review of Systems   Constitutional: Positive for appetite change (decreased). Negative for chills, diaphoresis and fatigue.   Respiratory: Negative for cough, shortness of breath and wheezing.    Cardiovascular: Negative for chest pain and palpitations.   Gastrointestinal: Positive for nausea. Negative for abdominal pain, constipation, diarrhea and vomiting.   Genitourinary: Negative for dysuria, flank pain, frequency, hematuria and urgency.    Skin: Negative for color change and rash.   Neurological: Positive for dizziness and light-headedness. Negative for syncope, weakness, numbness and headaches.   Psychiatric/Behavioral: Positive for confusion. Negative for decreased concentration.     Objective:     Vital Signs (Most Recent):  Temp: 97.6 °F (36.4 °C) (03/07/19 0000)  Pulse: 62 (03/07/19 0100)  Resp: 20 (03/07/19 0100)  BP: 131/62 (03/07/19 0100)  SpO2: 99 % (03/07/19 0100) Vital Signs (24h Range):  Temp:  [97.6 °F (36.4 °C)] 97.6 °F (36.4 °C)  Pulse:  [56-66] 62  Resp:  [13-20] 20  SpO2:  [93 %-99 %] 99 %  BP: ()/(53-82) 131/62     Weight: 102.8 kg (226 lb 10.1 oz)  Body mass index is 36.58 kg/m².    Physical Exam   Constitutional: She is oriented to person, place, and time. She appears well-developed and well-nourished. No distress.   Obese female. Smells of cigarette smoke.    HENT:   Head: Normocephalic and atraumatic.   Eyes: Conjunctivae and EOM are normal. Pupils are equal, round, and reactive to light. No scleral icterus.   Neck: Normal range of motion. Neck supple. No tracheal deviation present.   Cardiovascular: Normal rate, regular rhythm, normal heart sounds and intact distal pulses. Exam reveals no gallop and no friction rub.   No murmur heard.  Pulmonary/Chest: Effort normal and breath sounds normal. No stridor. No respiratory distress. She has no wheezes. She has no rales.   Abdominal: Soft. Bowel sounds are normal. She exhibits no distension and no mass. There is no tenderness. There is no guarding.   Neurological: She is alert and oriented to person, place, and time.   Skin: Skin is warm and dry. She is not diaphoretic. No pallor.   Psychiatric: She has a normal mood and affect. Her behavior is normal. Judgment and thought content normal.   Nursing note and vitals reviewed.        CRANIAL NERVES     CN III, IV, VI   Pupils are equal, round, and reactive to light.  Extraocular motions are normal.        Significant Labs:   BMP:    Recent Labs   Lab 03/06/19  1559 03/07/19  0015   * 102  102   * 117*  117*   K 3.5 2.4*  2.4*   CL 74* 76*  76*   CO2 29 27  27   BUN 49* 48*  48*   CREATININE 1.5* 1.3  1.3   CALCIUM 9.4 8.9  8.9   MG 3.7*  --      CBC:   Recent Labs   Lab 03/06/19  1559   WBC 9.89   HGB 12.2   HCT 34.1*        CMP:   Recent Labs   Lab 03/06/19  1559 03/07/19  0015   * 117*  117*   K 3.5 2.4*  2.4*   CL 74* 76*  76*   CO2 29 27  27   * 102  102   BUN 49* 48*  48*   CREATININE 1.5* 1.3  1.3   CALCIUM 9.4 8.9  8.9   PROT 7.0  --    ALBUMIN 3.5  --    BILITOT 0.4  --    ALKPHOS 75  --    AST 23  --    ALT 20  --    ANIONGAP 12 14  14   EGFRNONAA 35* 42*  42*     Urine Culture: No results for input(s): LABURIN in the last 48 hours.  Urine Studies: No results for input(s): COLORU, APPEARANCEUA, PHUR, SPECGRAV, PROTEINUA, GLUCUA, KETONESU, BILIRUBINUA, OCCULTUA, NITRITE, UROBILINOGEN, LEUKOCYTESUR, RBCUA, WBCUA, BACTERIA, SQUAMEPITHEL, HYALINECASTS in the last 48 hours.    Invalid input(s): WRIGHTSUR  All pertinent labs within the past 24 hours have been reviewed.    Significant Imaging: I have reviewed all pertinent imaging results/findings within the past 24 hours.   Imaging Results          CT Head Without Contrast (Final result)  Result time 03/06/19 20:23:34    Final result by Jatin Milligan MD (03/06/19 20:23:34)                 Impression:      No CT evidence of territorial infarction.  No acute intracranial process.  MRI of the brain may be attempted for further evaluation.    Paranasal sinus disease.      Electronically signed by: Jatin Milligan MD  Date:    03/06/2019  Time:    20:23             Narrative:    EXAMINATION:  CT HEAD WITHOUT CONTRAST    CLINICAL HISTORY:  Neuro deficit(s), subacute;    TECHNIQUE:  Low dose axial images were obtained through the head.  Coronal and sagittal reformations were also performed. Contrast was not administered.    COMPARISON:  CT scan of  the head dated 08/14/2016.    FINDINGS:  The subcutaneous tissues are unremarkable.  The bony calvarium is intact.  There is mucosal thickening within the left maxillary sinus.  The remainder of the paranasal sinuses are unremarkable.  The mastoid air cells are clear.  There are postoperative changes in the orbits.    The craniocervical junction is within normal limits.  There are no extra-axial fluid collections.  There is no evidence of intracranial hemorrhage.  The ventricles and sulci are prominent, suggestive of cerebral volume loss.  The cisterns are unremarkable.  The gray-white differentiation is maintained.  There is no evidence of mass effect.                                 Assessment/Plan:     * Hyponatremia    - Ms. Nalini Varma is admitted to inpatient status   - sodium was 115 upon ED evaluation   - upon arrival to the ICU, it appears the patient has been receiving IV NS at rate of 125cc/hour  - will await repeat metabolic panel and continue to monitor hourly until serum sodium increased by 4-6 mmol/L per Uptodate rec's  - Nephrology consulted      Heart failure with preserved ejection fraction    - last ECHO 12/11/18:  · Normal left ventricular systolic function. The estimated ejection fraction is 65%  · Indeterminate left ventricular diastolic function.  · No wall motion abnormalities.  · Normal right ventricular systolic function.  · Moderate left atrial enlargement.  · Normal central venous pressure (3 mm Hg).  · Trace tricuspid regurgitation.  · Trace aortic regurgitation.  · Aortic valve not well seen. increase gradient acorss the valve related to increase LVOT flow, there is no stenosis by doppler.   - hold torsemide now as patient is hyponatremic and suspect this could partially be 2/2 known major side effects of torsemide (hyponatremia, and elevated BUN & Cr)  - daily weights, strict I&O's         Chronic obstructive pulmonary disease    - no current exacerbation  - continue home meds         Chronic respiratory failure with hypoxia    - wears 3L O2 via NC at home   - oxygen ordered, no increased requirements at present      Hypertension, benign    - most recent blood pressure reading BP: (!) 121/58   - continue home meds  - hydralazine PRN for SBP > 170  - monitor        Hyperlipidemia    - continue statin       Anemia    - appears hemo-concentrated as compared to prior measurement       MARY (generalized anxiety disorder)    - continue home meds        Tobacco dependence    - PRN nicotine patch          VTE Risk Mitigation (From admission, onward)        Ordered     IP VTE HIGH RISK PATIENT  Once      03/06/19 2359     Place REBECCA hose  Until discontinued      03/06/19 2359     heparin (porcine) injection 5,000 Units  Every 12 hours      03/06/19 2305     Place sequential compression device  Until discontinued      03/06/19 2305             Cici Lilly PA-C  Department of Hospital Medicine   [facility]

## 2019-03-07 NOTE — PT/OT/SLP EVAL
"Speech Language Pathology Evaluation  Bedside Swallow    Patient Name:  Nalini Varma   MRN:  [id number]  Admitting Diagnosis: Hyponatremia    Recommendations:                 General Recommendations:    1. Speech to follow up 2x/week for diet tolerance and ongoing assessment of patient reported dysphagia    Diet recommendations:  Regular, Thin     Aspiration Precautions: 1 bite/sip at a time, HOB to 90 degrees, Remain upright 30 minutes post meal and Small bites/sips, continue to defer oral intake when having sensation of inability to swallow    General Precautions: Standard,      History:     HPI:  Ms. Nalini Varma is a 68 y.o. female, with PMH of SIADH, HTN, CHF, COPD, anemia, HLD, chronic respiratory failrue (on home O2), and anxiety, who presented to [facility] ED on 3/6/19 2/2 dizziness. She states this has been ongoing for "a few days" which worsened on the morning of 3/6/19. She states this is not a spinning sensation, but a light headed sensation. She states it is worse with movement, and after standing. Associated symptoms include nausea and decreased appetite, and generalized abdominal pain, decreased urination. She denies vomiting, diarrhea, constipation, dysuria, SOB, chest pain, fever, cough, congestion. She states she started taking losartan two weeks ago. She was evaluated in the ED, and found to have hyponatremia of 115, and low chloride, as well as JONATHAN. She was admitted to inpatient status to the ICU.        Past Medical History:   Diagnosis Date    Allergy     Anemia due to gastrointestinal blood loss     LESLY from gastric ulcer due to chronic nsaid use    Anxiety     Arthritis     CKD (chronic kidney disease) stage 3, GFR 30-59 ml/min     followed by [facility] study - recieved labs from 2017 and 2/2018    Gastric ulcer     H/O knee surgery 2001    right    Heart failure with preserved ejection fraction 8/20/2018    Hx of psychiatric care     Hyperlipidemia     Hypertension  " "   Hyponatremia     Psychiatric problem     Therapy     Tobacco abuse        Past Surgical History:   Procedure Laterality Date    ANGIOGRAM, CORONARY ARTERY N/A 7/20/2018    Performed by Willard Roberts MD at [facility] CATH LAB    BREAST CYST EXCISION Right     1967    COLONOSCOPY  2015    ESOPHAGOGASTRODUODENOSCOPY  2015    FRACTURE SURGERY      left femur    JOINT REPLACEMENT  2007    left knee x 2, 2nd performed 2/2 to MRSA infection 3 months after 1st surgery    ORIF FEMUR FRACTURE Left     TUBAL LIGATION       Prior diet: Pt reports being able to consume a regular diet with thin liquids with out difficulty, despite instances of exacerbation of COPD, until approx 2-3 weeks ago. She reports occasional instances of feeling "swollen" and unable to swallow, or food getting stuck in throat to mid chest area. She reports it does not happen daily and she does not eat when she feels it coming on        Subjective     Pt awake, sitting up in bed. Agreeable to evaluation.    Pain/Comfort:  · Pain Rating 1: 0/10    Objective:     Oral Musculature Evaluation  · Oral Musculature: WNL  · Dentition: present and adequate  · Secretion Management: adequate  · Mucosal Quality: good  · Mandibular Strength and Mobility: WNL  · Oral Labial Strength and Mobility: WNL  · Lingual Strength and Mobility: WNL  · Buccal Strength and Mobility: WNL  · Volitional Cough: strong  · Volitional Swallow: prompt  · Voice Prior to PO Intake: mildly raspy  · Oral Musculature Comments: face is symmetrical at rest and during smile. Labial and lingual strength and ROM are WNL: lingual protrusion is midline; patient able lateralize/retract/elevate tongue; vocal quality is clear, mildly raspy. Speech is 100% intelligible at the conversational level    RESPIRATORY STATUS: NC off at beginning eval, with SPO2 levels in the 60-70s. Probe also noted to be not correlating. Quick recovery above 95% with replacement if NC and with adjustment of finger " probe. RR 17-19.  Bedside Swallow Eval:   Consistencies Assessed:  · Thin liquids 2 oz water via straw  · Solids dry solids     Oral Phase:   · WNL for lip seal, ability to form a cohesive bolus, mastication of solids and a-p transport of liquids and solids    Pharyngeal Phase:   · Trigger of the swallow reflex appears timely upon subjective palpation  · No signs of airway threat or aspiration on intake of 2 oz of water and 1 package of she crackers: no coughing, throat clearing or change in vocal quality  · No signs of pharyngeal residuals: pt able to manage each bolus with 1-2 swallow  · No pt reports of swallowing difficulty or sensation of food sticking  · Pt reports poor appetite only.    Assessment:     Nalini Varma is a 68 y.o. female with an SLP diagnosis of occasion report of pharyngeal dysphagia. At this time she is resuming a regular consistency diet with thin liquids. She reported no difficulty managing solids this session. Will follow up x1-2 to continue to assess due to patient report of occasional dysphagia.  Goals:   Multidisciplinary Problems     SLP Goals        Problem: SLP Goal    Goal Priority Disciplines Outcome   SLP Goal     SLP Ongoing (interventions implemented as appropriate)   Description:  1. Pt will be able to consume a regular diet with thin liquids with no signs of airway threat, aspiration, difficulty initiating a swallow or sensation of residuals with min assistance to follow through on compensatory strategies                    Plan:     · Patient to be seen:  2 x/week   · Plan of Care expires:  03/15/19  · Plan of Care reviewed with:  patient(RN)   · SLP Follow-Up:  Yes       Discharge recommendations:    home      Time Tracking:     SLP Treatment Date:   03/07/19  Speech Start Time:  0957  Speech Stop Time:  1018     Speech Total Time (min):  21 min    Billable Minutes: Eval Swallow and Oral Function 21    Aranza Sharma CCC-SLP  03/07/2019

## 2019-03-07 NOTE — ED NOTES
Received report, pt lying in bed, respirations even, unlabored, eyes open spontaneously, NAD noted call bell within reach, aware of need for urine sample. Pt updated on plan of care, will continue to monitor with BP cycling, pulse ox, and cardiac monitoring

## 2019-03-08 ENCOUNTER — PATIENT MESSAGE (OUTPATIENT)
Dept: CARDIOLOGY | Facility: CLINIC | Age: 69
End: 2019-03-08

## 2019-03-08 LAB
ANION GAP SERPL CALC-SCNC: 11 MMOL/L
ANION GAP SERPL CALC-SCNC: 12 MMOL/L
ANION GAP SERPL CALC-SCNC: 8 MMOL/L
ANION GAP SERPL CALC-SCNC: 9 MMOL/L
BASOPHILS # BLD AUTO: 0.01 K/UL
BASOPHILS NFR BLD: 0.1 %
BUN SERPL-MCNC: 45 MG/DL
BUN SERPL-MCNC: 46 MG/DL
BUN SERPL-MCNC: 47 MG/DL
BUN SERPL-MCNC: 48 MG/DL
BUN SERPL-MCNC: 50 MG/DL
BUN SERPL-MCNC: 51 MG/DL
CALCIUM SERPL-MCNC: 8.2 MG/DL
CALCIUM SERPL-MCNC: 8.4 MG/DL
CALCIUM SERPL-MCNC: 8.9 MG/DL
CALCIUM SERPL-MCNC: 9 MG/DL
CHLORIDE SERPL-SCNC: 86 MMOL/L
CHLORIDE SERPL-SCNC: 88 MMOL/L
CHLORIDE SERPL-SCNC: 88 MMOL/L
CHLORIDE SERPL-SCNC: 89 MMOL/L
CHLORIDE SERPL-SCNC: 90 MMOL/L
CHLORIDE SERPL-SCNC: 91 MMOL/L
CO2 SERPL-SCNC: 25 MMOL/L
CO2 SERPL-SCNC: 25 MMOL/L
CO2 SERPL-SCNC: 26 MMOL/L
CO2 SERPL-SCNC: 26 MMOL/L
CO2 SERPL-SCNC: 27 MMOL/L
CO2 SERPL-SCNC: 29 MMOL/L
CREAT SERPL-MCNC: 1.1 MG/DL
CREAT SERPL-MCNC: 1.2 MG/DL
DIFFERENTIAL METHOD: ABNORMAL
EOSINOPHIL # BLD AUTO: 0.1 K/UL
EOSINOPHIL NFR BLD: 1.1 %
ERYTHROCYTE [DISTWIDTH] IN BLOOD BY AUTOMATED COUNT: 15.1 %
EST. GFR  (AFRICAN AMERICAN): 53 ML/MIN/1.73 M^2
EST. GFR  (AFRICAN AMERICAN): 59 ML/MIN/1.73 M^2
EST. GFR  (NON AFRICAN AMERICAN): 46 ML/MIN/1.73 M^2
EST. GFR  (NON AFRICAN AMERICAN): 51 ML/MIN/1.73 M^2
GLUCOSE SERPL-MCNC: 104 MG/DL
GLUCOSE SERPL-MCNC: 105 MG/DL
GLUCOSE SERPL-MCNC: 110 MG/DL
GLUCOSE SERPL-MCNC: 116 MG/DL
GLUCOSE SERPL-MCNC: 133 MG/DL
GLUCOSE SERPL-MCNC: 135 MG/DL
HCT VFR BLD AUTO: 29.7 %
HGB BLD-MCNC: 10 G/DL
LYMPHOCYTES # BLD AUTO: 1.7 K/UL
LYMPHOCYTES NFR BLD: 23.9 %
MAGNESIUM SERPL-MCNC: 2 MG/DL
MCH RBC QN AUTO: 28.7 PG
MCHC RBC AUTO-ENTMCNC: 33.7 G/DL
MCV RBC AUTO: 85 FL
MONOCYTES # BLD AUTO: 0.6 K/UL
MONOCYTES NFR BLD: 7.7 %
NEUTROPHILS # BLD AUTO: 4.9 K/UL
NEUTROPHILS NFR BLD: 66.9 %
PLATELET # BLD AUTO: 283 K/UL
PMV BLD AUTO: 9.9 FL
POTASSIUM SERPL-SCNC: 3.2 MMOL/L
POTASSIUM SERPL-SCNC: 3.4 MMOL/L
POTASSIUM SERPL-SCNC: 3.7 MMOL/L
POTASSIUM SERPL-SCNC: 3.7 MMOL/L
POTASSIUM SERPL-SCNC: 3.9 MMOL/L
POTASSIUM SERPL-SCNC: 4.4 MMOL/L
RBC # BLD AUTO: 3.49 M/UL
SODIUM SERPL-SCNC: 123 MMOL/L
SODIUM SERPL-SCNC: 123 MMOL/L
SODIUM SERPL-SCNC: 125 MMOL/L
SODIUM SERPL-SCNC: 125 MMOL/L
SODIUM SERPL-SCNC: 126 MMOL/L
SODIUM SERPL-SCNC: 126 MMOL/L
URATE SERPL-MCNC: 0.8 MG/DL
WBC # BLD AUTO: 7.28 K/UL

## 2019-03-08 PROCEDURE — 25000003 PHARM REV CODE 250: Performed by: INTERNAL MEDICINE

## 2019-03-08 PROCEDURE — 80048 BASIC METABOLIC PNL TOTAL CA: CPT | Mod: 91

## 2019-03-08 PROCEDURE — 97802 MEDICAL NUTRITION INDIV IN: CPT

## 2019-03-08 PROCEDURE — 25000003 PHARM REV CODE 250: Performed by: NURSE PRACTITIONER

## 2019-03-08 PROCEDURE — 84550 ASSAY OF BLOOD/URIC ACID: CPT

## 2019-03-08 PROCEDURE — 94640 AIRWAY INHALATION TREATMENT: CPT

## 2019-03-08 PROCEDURE — 99233 SBSQ HOSP IP/OBS HIGH 50: CPT | Mod: ,,, | Performed by: INTERNAL MEDICINE

## 2019-03-08 PROCEDURE — 99900035 HC TECH TIME PER 15 MIN (STAT)

## 2019-03-08 PROCEDURE — 25000242 PHARM REV CODE 250 ALT 637 W/ HCPCS: Performed by: PHYSICIAN ASSISTANT

## 2019-03-08 PROCEDURE — 25000003 PHARM REV CODE 250: Performed by: PHYSICIAN ASSISTANT

## 2019-03-08 PROCEDURE — 85025 COMPLETE CBC W/AUTO DIFF WBC: CPT

## 2019-03-08 PROCEDURE — 36415 COLL VENOUS BLD VENIPUNCTURE: CPT

## 2019-03-08 PROCEDURE — 11000001 HC ACUTE MED/SURG PRIVATE ROOM

## 2019-03-08 PROCEDURE — 94761 N-INVAS EAR/PLS OXIMETRY MLT: CPT

## 2019-03-08 PROCEDURE — 99233 PR SUBSEQUENT HOSPITAL CARE,LEVL III: ICD-10-PCS | Mod: ,,, | Performed by: INTERNAL MEDICINE

## 2019-03-08 PROCEDURE — 63600175 PHARM REV CODE 636 W HCPCS: Performed by: INTERNAL MEDICINE

## 2019-03-08 PROCEDURE — 83735 ASSAY OF MAGNESIUM: CPT

## 2019-03-08 RX ORDER — SODIUM CHLORIDE 9 MG/ML
INJECTION, SOLUTION INTRAVENOUS ONCE
Status: COMPLETED | OUTPATIENT
Start: 2019-03-08 | End: 2019-03-08

## 2019-03-08 RX ORDER — SODIUM CHLORIDE 9 MG/ML
INJECTION, SOLUTION INTRAVENOUS CONTINUOUS
Status: DISCONTINUED | OUTPATIENT
Start: 2019-03-08 | End: 2019-03-09

## 2019-03-08 RX ADMIN — HYDROCODONE BITARTRATE AND ACETAMINOPHEN 1 TABLET: 10; 325 TABLET ORAL at 07:03

## 2019-03-08 RX ADMIN — FLUTICASONE FUROATE AND VILANTEROL TRIFENATATE 1 PUFF: 100; 25 POWDER RESPIRATORY (INHALATION) at 09:03

## 2019-03-08 RX ADMIN — ONDANSETRON 8 MG: 8 TABLET, ORALLY DISINTEGRATING ORAL at 05:03

## 2019-03-08 RX ADMIN — ATORVASTATIN CALCIUM 40 MG: 20 TABLET, FILM COATED ORAL at 10:03

## 2019-03-08 RX ADMIN — SODIUM CHLORIDE: 0.9 INJECTION, SOLUTION INTRAVENOUS at 07:03

## 2019-03-08 RX ADMIN — SODIUM CHLORIDE TAB 1 GM 1 G: 1 TAB at 10:03

## 2019-03-08 RX ADMIN — ASPIRIN 81 MG: 81 TABLET, COATED ORAL at 10:03

## 2019-03-08 RX ADMIN — POTASSIUM CHLORIDE 40 MEQ: 40 SOLUTION ORAL at 05:03

## 2019-03-08 RX ADMIN — TIOTROPIUM BROMIDE 18 MCG: 18 CAPSULE ORAL; RESPIRATORY (INHALATION) at 09:03

## 2019-03-08 RX ADMIN — SODIUM CHLORIDE: 0.9 INJECTION, SOLUTION INTRAVENOUS at 05:03

## 2019-03-08 RX ADMIN — FAMOTIDINE 20 MG: 20 TABLET, FILM COATED ORAL at 09:03

## 2019-03-08 RX ADMIN — FAMOTIDINE 20 MG: 20 TABLET, FILM COATED ORAL at 10:03

## 2019-03-08 RX ADMIN — ENOXAPARIN SODIUM 40 MG: 100 INJECTION SUBCUTANEOUS at 06:03

## 2019-03-08 RX ADMIN — SODIUM CHLORIDE: 0.9 INJECTION, SOLUTION INTRAVENOUS at 01:03

## 2019-03-08 NOTE — SUBJECTIVE & OBJECTIVE
Interval History: No acute events overnight. Feeling improved; no further dizziness. No other concerns at this time.    Review of Systems   Constitutional: Negative for chills and fever.   Respiratory: Negative for cough and shortness of breath.    Cardiovascular: Negative for chest pain and palpitations.   Gastrointestinal: Negative for abdominal pain, nausea and vomiting.     Objective:     Vital Signs (Most Recent):  Temp: 97.9 °F (36.6 °C) (03/08/19 0800)  Pulse: 69 (03/08/19 0921)  Resp: 18 (03/08/19 0921)  BP: 118/67 (03/08/19 0800)  SpO2: 99 % (03/08/19 0920) Vital Signs (24h Range):  Temp:  [97.8 °F (36.6 °C)-98 °F (36.7 °C)] 97.9 °F (36.6 °C)  Pulse:  [62-72] 69  Resp:  [9-36] 18  SpO2:  [89 %-100 %] 99 %  BP: ()/(46-67) 118/67     Weight: 104.8 kg (231 lb 0.7 oz)  Body mass index is 37.29 kg/m².    Intake/Output Summary (Last 24 hours) at 3/8/2019 1029  Last data filed at 3/8/2019 0600  Gross per 24 hour   Intake 2310.83 ml   Output 950 ml   Net 1360.83 ml      Physical Exam   Constitutional: She is oriented to person, place, and time. She appears well-developed. No distress.   Obese.   HENT:   Head: Normocephalic and atraumatic.   Eyes: Conjunctivae and EOM are normal.   Cardiovascular: Normal rate, regular rhythm, S1 normal, S2 normal and intact distal pulses.   Pulmonary/Chest: Effort normal. No respiratory distress.   Abdominal: Soft. She exhibits no distension. There is no tenderness.   Musculoskeletal: Normal range of motion. She exhibits no edema.   Neurological: She is alert and oriented to person, place, and time.   Skin: Skin is warm and dry.   Nursing note and vitals reviewed.    Significant Labs:   CBC:  Recent Labs   Lab 03/06/19  1559 03/07/19  0411 03/08/19  0403   WBC 9.89 10.95 7.28   HGB 12.2 10.7* 10.0*   HCT 34.1* 30.2* 29.7*    277 283   GRAN 81.5*  8.1* 80.3*  8.8* 66.9  4.9   LYMPH 9.9*  1.0 12.5*  1.4 23.9  1.7   MONO 7.8  0.8 6.3  0.7 7.7  0.6   EOS 0.0 0.1  0.1   BASO 0.01 0.01 0.01      BMP:  Recent Labs   Lab 03/06/19  1559  03/07/19  0411 03/07/19  0806  03/08/19  0000 03/08/19  0403 03/08/19  0815   *   < > 118* 119*   < > 123* 123* 126*   K 3.5   < > 2.2* 3.3*   < > 3.4* 3.2* 4.4   CL 74*   < > 78* 79*   < > 86* 88* 89*   CO2 29   < > 28 26   < > 25 26 29   BUN 49*   < > 48* 48*   < > 51* 50* 47*   CREATININE 1.5*   < > 1.1 1.2   < > 1.2 1.1 1.2   *   < > 100 104   < > 116* 104 110   CALCIUM 9.4   < > 8.7 8.8   < > 8.4* 8.2* 9.0   MG 3.7*  --  2.4  --   --   --  2.0  --    PHOS  --   --   --  2.7  --   --   --   --     < > = values in this interval not displayed.     Significant Imaging:   No new imaging this morning.

## 2019-03-08 NOTE — CONSULTS
"[facility]  Adult Nutrition  Consult Note    SUMMARY       Recommendations    Recommendation:   1. Continue cardiac diet with 800ml fluid restriction per team   2. Provide verbal education; will reattempt further diet education at follow-up    Goals:   1. Meet >85% EEN and EPN   2. Prevent dry wt loss of >2% per week   3. Promote nutrition related labs wnl    Nutrition Goal Status: new  Communication of RD Recs: discussed on rounds    Reason for Assessment    Reason For Assessment: consult  Diagnosis: other (see comments)(Hyponatremia)  Relevant Medical History: SIADH, HTN, CHF, COPD, Anemia, Hyperlipidemia, Chronic respiratory failure (on home O2), Anxiety  Interdisciplinary Rounds: attended    General Information Comments: Pt endorses improved appetite since admission. Noted with poor PO intake and difficulty swallowing when first admitted. Denies any wt loss, wt fluctuations in chart likely fluid related. Pt was slightly agitated at time of visit. Discussed fluid restriction and low salt intake, pt was very focused on issues with meal service at breakfast, will f/u with additional education when pt amenable. No significant physical s/s of malnutrition at this time.     Nutrition Discharge Planning: d/c on low Na with 1L fluid restriction    Nutrition Risk Screen    Nutrition Risk Screen: no indicators present    Nutrition/Diet History    Spiritual, Cultural Beliefs, Church Practices, Values that Affect Care: no    Anthropometrics    Temp: 96.3 °F (35.7 °C)  Height Method: Stated  Height: 5' 6" (167.6 cm)  Height (inches): 66 in  Weight Method: Bed Scale  Weight: 104.8 kg (231 lb 0.7 oz)  Weight (lb): 231.04 lb  Ideal Body Weight (IBW), Female: 130 lb  % Ideal Body Weight, Female (lb): 177.72 lb  BMI (Calculated): 37.4  BMI Grade: 35 - 39.9 - obesity - grade II       Lab/Procedures/Meds    Pertinent Labs: Reviewed  Lab Results   Component Value Date     (L) 03/08/2019    K 3.7 " 03/08/2019    CL 88 (L) 03/08/2019    BUN 46 (H) 03/08/2019    ESTGFRAFRICA 53 (A) 03/08/2019    EGFRNONAA 46 (A) 03/08/2019     Pertinent Meds: Reviewed  Scheduled Meds:   aspirin  81 mg Oral Daily    atorvastatin  40 mg Oral Daily    enoxaparin  40 mg Subcutaneous Daily    famotidine  20 mg Oral BID    fluticasone-vilanterol  1 puff Inhalation Daily    sodium chloride  1 g Oral Daily    tiotropium  1 capsule Inhalation Daily     Estimated/Assessed Needs    Weight Used For Calorie Calculations: 104.8 kg (231 lb 0.7 oz)  Energy Calorie Requirements (kcal): 1985  Energy Need Method: West-St Jeor(stress factor 1.25)  Protein Requirements:  gm/d (0.8-1 gm/kg)  Weight Used For Protein Calculations: 104.8 kg (231 lb 0.7 oz)  Fluid Requirements (mL): 1985(or per team)  Estimated Fluid Requirement Method: RDA Method  RDA Method (mL): 1985    Nutrition Prescription Ordered    Current Diet Order: cardiac  Nutrition Order Comments: 800ml fluid restriction    Evaluation of Received Nutrient/Fluid Intake    % Intake of Estimated Energy Needs: 75 - 100 %  % Meal Intake: 75 - 100 %    Nutrition Risk    Level of Risk/Frequency of Follow-up: low     Assessment and Plan    Nutrition Problem  Decreased nutrient needs (Na, water)  Related to (etiology):   SIADH and CHF  Signs and Symptoms (as evidenced by):   H/o fluid accumulation and hyponatremia   Interventions:  Collaboration with Providers   Nutrition Diagnosis Status:   New    Monitor and Evaluation    Food and Nutrient Intake: energy intake, food and beverage intake  Food and Nutrient Adminstration: diet order  Knowledge/Beliefs/Attitudes: food and nutrition knowledge/skill, beliefs and attitudes  Physical Activity and Function: nutrition-related ADLs and IADLs  Anthropometric Measurements: weight, weight change  Biochemical Data, Medical Tests and Procedures: electrolyte and renal panel, gastrointestinal profile, glucose/endocrine profile, inflammatory profile,  lipid profile  Nutrition-Focused Physical Findings: overall appearance, skin, extremities, muscles and bones     Malnutrition Assessment     Teeth (Micronutrient): broken dentition   Orbital Region (Subcutaneous Fat Loss): moderate depletion  Upper Arm Region (Subcutaneous Fat Loss): well nourished   [place] Region (Muscle Loss): well nourished  Clavicle Bone Region (Muscle Loss): well nourished  Clavicle and Acromion Bone Region (Muscle Loss): well nourished     Nutrition Follow-Up    RD Follow-up?: Yes    Carmen Lozano, MS, RD, LDN   Dietitian, [facility]  [phone]

## 2019-03-08 NOTE — HOSPITAL COURSE
After admission Ms. Varma was started on gentle IVFs with NS and nephrology was consulted. Fluid restriction was placed and she was started on PO sodium supplementation as well. Antihypertensives were held as her blood pressure was normotensive; question whether medication non-adherence may be a factor given her significant

## 2019-03-08 NOTE — PLAN OF CARE
"Discussed wheelchair request with patient & she voiced a preference for a standard "small wheelchair" - PT assisted in sizing patient & recommended a 20" - Annette from [facility] gave approval to pull from our supply - delivered 20" wheelchair with elevating leg rests, basic cushion, & velcro seatbelt to bedside - delivery ticket & rental agreement signed   "

## 2019-03-08 NOTE — PLAN OF CARE
Problem: Adult Inpatient Plan of Care  Goal: Plan of Care Review  Recommendation:   1. Continue cardiac diet with 800ml fluid restriction per team   2. Provide verbal education; will reattempt further diet education at follow-up     Goals:   1. Meet >85% EEN and EPN   2. Prevent dry wt loss of >2% per week   3. Promote nutrition related labs wnl

## 2019-03-08 NOTE — ASSESSMENT & PLAN NOTE
- Hyponatremia with underlying SIADH; prior nephrology eval had recommended fluid restriction to 1L in past, as of 09/2018 had returned to normonatremic, however.  - Continuing PO sodium supplementation; hold further IVFs for time being.  - Goal sodium ~130mEq for next 24hr.  - Appreciate nephrology assistance.

## 2019-03-08 NOTE — PROGRESS NOTES
"[facility] Hospital Medicine  Progress Note    Patient Name: Nalini Varma  MRN: [id number]  Patient Class: IP- Inpatient   Admission Date: 3/6/2019  Length of Stay: 2 days  Attending Physician: AMBER Austin MD  Primary Care Provider: Yane Sahni MD    Subjective:     Principal Problem:Hyponatremia    HPI:  Ms. Nalini Varma is a 68 y.o. female, with PMH of SIADH, HTN, CHF, COPD, anemia, HLD, chronic respiratory failrue (on home O2), and anxiety, who presented to [facility] ED on 3/6/19 2/2 dizziness. She states this has been ongoing for "a few days" which worsened on the morning of 3/6/19. She states this is not a spinning sensation, but a light headed sensation. She states it is worse with movement, and after standing. Associated symptoms include nausea and decreased appetite, and generalized abdominal pain, decreased urination. She denies vomiting, diarrhea, constipation, dysuria, SOB, chest pain, fever, cough, congestion. She states she started taking losartan two weeks ago. She was evaluated in the ED, and found to have hyponatremia of 115, and low chloride, as well as JONATHAN. She was admitted to inpatient status to the ICU.    Hospital Course:  After admission Ms. Varma was started on gentle IVFs with NS and nephrology was consulted. Fluid restriction was placed and she was started on PO sodium supplementation as well. Sodium gradually trended upward.    Interval History: No acute events overnight. Feeling improved; no further dizziness. No other concerns at this time.    Review of Systems   Constitutional: Negative for chills and fever.   Respiratory: Negative for cough and shortness of breath.    Cardiovascular: Negative for chest pain and palpitations.   Gastrointestinal: Negative for abdominal pain, nausea and vomiting.     Objective:     Vital Signs (Most Recent):  Temp: 97.9 °F (36.6 °C) (03/08/19 0800)  Pulse: 69 (03/08/19 0921)  Resp: 18 (03/08/19 0921)  BP: " 118/67 (03/08/19 0800)  SpO2: 99 % (03/08/19 0920) Vital Signs (24h Range):  Temp:  [97.8 °F (36.6 °C)-98 °F (36.7 °C)] 97.9 °F (36.6 °C)  Pulse:  [62-72] 69  Resp:  [9-36] 18  SpO2:  [89 %-100 %] 99 %  BP: ()/(46-67) 118/67     Weight: 104.8 kg (231 lb 0.7 oz)  Body mass index is 37.29 kg/m².    Intake/Output Summary (Last 24 hours) at 3/8/2019 1029  Last data filed at 3/8/2019 0600  Gross per 24 hour   Intake 2310.83 ml   Output 950 ml   Net 1360.83 ml      Physical Exam   Constitutional: She is oriented to person, place, and time. She appears well-developed. No distress.   Obese.   HENT:   Head: Normocephalic and atraumatic.   Eyes: Conjunctivae and EOM are normal.   Cardiovascular: Normal rate, regular rhythm, S1 normal, S2 normal and intact distal pulses.   Pulmonary/Chest: Effort normal. No respiratory distress.   Abdominal: Soft. She exhibits no distension. There is no tenderness.   Musculoskeletal: Normal range of motion. She exhibits no edema.   Neurological: She is alert and oriented to person, place, and time.   Skin: Skin is warm and dry.   Nursing note and vitals reviewed.    Significant Labs:   CBC:  Recent Labs   Lab 03/06/19  1559 03/07/19  0411 03/08/19  0403   WBC 9.89 10.95 7.28   HGB 12.2 10.7* 10.0*   HCT 34.1* 30.2* 29.7*    277 283   GRAN 81.5*  8.1* 80.3*  8.8* 66.9  4.9   LYMPH 9.9*  1.0 12.5*  1.4 23.9  1.7   MONO 7.8  0.8 6.3  0.7 7.7  0.6   EOS 0.0 0.1 0.1   BASO 0.01 0.01 0.01      BMP:  Recent Labs   Lab 03/06/19  1559  03/07/19  0411 03/07/19  0806  03/08/19  0000 03/08/19  0403 03/08/19  0815   *   < > 118* 119*   < > 123* 123* 126*   K 3.5   < > 2.2* 3.3*   < > 3.4* 3.2* 4.4   CL 74*   < > 78* 79*   < > 86* 88* 89*   CO2 29   < > 28 26   < > 25 26 29   BUN 49*   < > 48* 48*   < > 51* 50* 47*   CREATININE 1.5*   < > 1.1 1.2   < > 1.2 1.1 1.2   *   < > 100 104   < > 116* 104 110   CALCIUM 9.4   < > 8.7 8.8   < > 8.4* 8.2* 9.0   MG 3.7*  --  2.4  --    --   --  2.0  --    PHOS  --   --   --  2.7  --   --   --   --     < > = values in this interval not displayed.     Significant Imaging:   No new imaging this morning.     Assessment/Plan:      * Hyponatremia    - Hyponatremia with underlying SIADH; prior nephrology eval had recommended fluid restriction to 1L in past, as of 09/2018 had returned to normonatremic, however.  - Continuing PO sodium supplementation; hold further IVFs for time being.  - Goal sodium ~130mEq for next 24hr.  - Appreciate nephrology assistance.     Heart failure with preserved ejection fraction    - Last 2D Echo 12/2018 EF 65%, diastolic dysfunction.  - Does not appear volume overloaded currently.  - Holding medications that could affect BP for time being, resume as feasible.  - Hold home torsemide.     Chronic respiratory failure with hypoxia    - Reported use of 3L O2 via NC at home with underlying COPD.  - Continue supplemental O2 as needed.     MARY (generalized anxiety disorder)    - Continuing hydroxyzine 25mg PO TID PRN.     Hyperlipidemia    - Continuing atorvastatin 40mg PO daily.     Tobacco dependence    - Cessation counseling.     Anemia    - Baseline anemia in 9s-10s; appears to have been hemoconcentrated on admission.  - Continue to monitor.     Hypertension, benign    - Hold antihypertensives for time being given low-normal BPs.     Chronic obstructive pulmonary disease    - Continuing fluticasone-vilanterol 100-25mcg inhaled daily, albuterol 2 puff inhaled q6hr PRN, tiotropium 18mcg inhaled daily.       VTE Risk Mitigation (From admission, onward)        Ordered     enoxaparin injection 40 mg  Daily      03/07/19 1145     IP VTE HIGH RISK PATIENT  Once      03/06/19 4213     Place sequential compression device  Until discontinued      03/06/19 1709        AMBER Krueger MD  Department of Hospital Medicine   [facility] [place], operated [facility]  [phone]

## 2019-03-08 NOTE — PLAN OF CARE
Problem: Adult Inpatient Plan of Care  Goal: Plan of Care Review  Outcome: Ongoing (interventions implemented as appropriate)  Pt on 2 lpm nasal cannula, prn inhaler not required.

## 2019-03-08 NOTE — PROGRESS NOTES
"Nephrology  Progress Note    Admit Date: 3/6/2019   LOS: 2 days     SUBJECTIVE:     Follow-up For:  Hyponatremia    Interval History:    Trends improving.  Uneventful night.       Review of Systems:  Constitutional: No fever or chills  Respiratory: shortness of breath  Cardiovascular: No chest pain or palpitations  Gastrointestinal: No nausea or vomiting  Neurological: No confusion or weakness    OBJECTIVE:     Vital Signs Range (Last 24H):  /66   Pulse 66   Temp 97.8 °F (36.6 °C) (Oral)   Resp 12   Ht 5' 6" (1.676 m)   Wt 104.8 kg (231 lb 0.7 oz)   SpO2 97%   Breastfeeding? No   BMI 37.29 kg/m²     Temp:  [97.8 °F (36.6 °C)-98.1 °F (36.7 °C)]   Pulse:  [56-72]   Resp:  [9-36]   BP: ()/(46-66)   SpO2:  [89 %-100 %]     I & O (Last 24H):    Intake/Output Summary (Last 24 hours) at 3/8/2019 0701  Last data filed at 3/8/2019 0600  Gross per 24 hour   Intake 2310.83 ml   Output 450 ml   Net 1860.83 ml       Physical Exam:  General appearance: Well developed, well nourished  Eyes:  Conjunctivae/corneas clear. PERRL.  Lungs: Normal respiratory effort,   chronic fine crackles  Heart: Regular/Mikel rate and rhythm, S1, S2 normal, no murmur, rub or dionte.  Abdomen: Soft, non-tender non-distended; bowel sounds normal; no masses,  no organomegaly, obese  Extremities: No cyanosis or clubbing. trace edema but chronic Woody legs.    Skin: Skin color, texture, turgor normal. No rashes or lesions  Neurologic: Normal strength and tone. No focal numbness or weakness          Laboratory Data:  Recent Labs   Lab 03/08/19  0403   WBC 7.28   RBC 3.49*   HGB 10.0*   HCT 29.7*      MCV 85   MCH 28.7   MCHC 33.7       BMP:   Recent Labs   Lab 03/08/19  0403      *   K 3.2*   CL 88*   CO2 26   BUN 50*   CREATININE 1.1   CALCIUM 8.2*   MG 2.0     Lab Results   Component Value Date    CALCIUM 8.2 (L) 03/08/2019    PHOS 2.7 03/07/2019       Lab Results   Component Value Date    CALCIUM 8.2 (L) 03/08/2019 "    PHOS 2.7 03/07/2019       Lab Results   Component Value Date    URICACID 16.8 (H) 03/07/2019       BNP  Recent Labs   Lab 03/06/19  1559   BNP 87       Medications:  Medication list was reviewed and changes noted under Assessment/Plan.    Diagnostic Results:        ASSESSMENT/PLAN:     Resolving Acute on chronic hyponatremia (E87.1):  History of mild SIADH and has been stable with water restriction.  Has receives doses of tolvaptan over previous hospitalizations.  However this current hyponatremia is likely from volume depletion as her urine sodium is low, hypotensive, and poor intake over last few days.    -No need for Samsca at this time.    Will continue with  Ns/salt tablets with  water restriction.  Continue to monitor q8.  Reached goal of 123 first 24 hours and today should be about 130.      Severe hyperuricemia from ? (E79.0):  Received elitek yesterday to avoid uric acid nephroapthy      Resolved nonoliguric JONATHAN on CKD III from volume depletion (N17.9, N18.3):  Creat back to baseline.  Renally dose meds, avoid nephrotoxins, and monitor I/O's closely.    Severe COPD (J44.9):  Still smokes....     See above

## 2019-03-08 NOTE — PLAN OF CARE
Problem: Adult Inpatient Plan of Care  Goal: Plan of Care Review  Outcome: Ongoing (interventions implemented as appropriate)  Patient on 2 lpm saturations WNL DPI given tolerated well.

## 2019-03-09 VITALS
SYSTOLIC BLOOD PRESSURE: 147 MMHG | TEMPERATURE: 98 F | RESPIRATION RATE: 16 BRPM | WEIGHT: 231.06 LBS | OXYGEN SATURATION: 94 % | DIASTOLIC BLOOD PRESSURE: 71 MMHG | HEART RATE: 86 BPM | BODY MASS INDEX: 37.14 KG/M2 | HEIGHT: 66 IN

## 2019-03-09 LAB
ANION GAP SERPL CALC-SCNC: 10 MMOL/L
ANION GAP SERPL CALC-SCNC: 11 MMOL/L
ANION GAP SERPL CALC-SCNC: 8 MMOL/L
ANION GAP SERPL CALC-SCNC: 9 MMOL/L
BASOPHILS # BLD AUTO: 0.01 K/UL
BASOPHILS NFR BLD: 0.1 %
BUN SERPL-MCNC: 39 MG/DL
BUN SERPL-MCNC: 39 MG/DL
BUN SERPL-MCNC: 40 MG/DL
BUN SERPL-MCNC: 45 MG/DL
CALCIUM SERPL-MCNC: 8.5 MG/DL
CALCIUM SERPL-MCNC: 8.7 MG/DL
CALCIUM SERPL-MCNC: 8.9 MG/DL
CALCIUM SERPL-MCNC: 9.1 MG/DL
CHLORIDE SERPL-SCNC: 91 MMOL/L
CHLORIDE SERPL-SCNC: 92 MMOL/L
CO2 SERPL-SCNC: 25 MMOL/L
CO2 SERPL-SCNC: 26 MMOL/L
CO2 SERPL-SCNC: 27 MMOL/L
CO2 SERPL-SCNC: 27 MMOL/L
CREAT SERPL-MCNC: 1 MG/DL
CREAT SERPL-MCNC: 1.1 MG/DL
CREAT UR-MCNC: 62.8 MG/DL
DIFFERENTIAL METHOD: ABNORMAL
EOSINOPHIL # BLD AUTO: 0.1 K/UL
EOSINOPHIL NFR BLD: 1.6 %
ERYTHROCYTE [DISTWIDTH] IN BLOOD BY AUTOMATED COUNT: 15.7 %
EST. GFR  (AFRICAN AMERICAN): 59 ML/MIN/1.73 M^2
EST. GFR  (AFRICAN AMERICAN): >60 ML/MIN/1.73 M^2
EST. GFR  (NON AFRICAN AMERICAN): 51 ML/MIN/1.73 M^2
EST. GFR  (NON AFRICAN AMERICAN): 58 ML/MIN/1.73 M^2
GLUCOSE SERPL-MCNC: 101 MG/DL
GLUCOSE SERPL-MCNC: 104 MG/DL
GLUCOSE SERPL-MCNC: 114 MG/DL
GLUCOSE SERPL-MCNC: 114 MG/DL
HCT VFR BLD AUTO: 31.6 %
HGB BLD-MCNC: 10.3 G/DL
LYMPHOCYTES # BLD AUTO: 1.2 K/UL
LYMPHOCYTES NFR BLD: 16 %
MAGNESIUM SERPL-MCNC: 1.9 MG/DL
MCH RBC QN AUTO: 28.6 PG
MCHC RBC AUTO-ENTMCNC: 32.6 G/DL
MCV RBC AUTO: 88 FL
MONOCYTES # BLD AUTO: 0.9 K/UL
MONOCYTES NFR BLD: 11.4 %
NEUTROPHILS # BLD AUTO: 5.4 K/UL
NEUTROPHILS NFR BLD: 70.2 %
OSMOLALITY UR: 320 MOSM/KG
PLATELET # BLD AUTO: 292 K/UL
PMV BLD AUTO: 9.5 FL
POTASSIUM SERPL-SCNC: 3.5 MMOL/L
POTASSIUM SERPL-SCNC: 3.6 MMOL/L
POTASSIUM SERPL-SCNC: 3.7 MMOL/L
POTASSIUM SERPL-SCNC: 3.8 MMOL/L
RBC # BLD AUTO: 3.6 M/UL
SODIUM SERPL-SCNC: 127 MMOL/L
SODIUM SERPL-SCNC: 129 MMOL/L
SODIUM UR-SCNC: <20 MMOL/L
WBC # BLD AUTO: 7.62 K/UL

## 2019-03-09 PROCEDURE — 80048 BASIC METABOLIC PNL TOTAL CA: CPT

## 2019-03-09 PROCEDURE — 99239 PR HOSPITAL DISCHARGE DAY,>30 MIN: ICD-10-PCS | Mod: ,,, | Performed by: INTERNAL MEDICINE

## 2019-03-09 PROCEDURE — 83935 ASSAY OF URINE OSMOLALITY: CPT

## 2019-03-09 PROCEDURE — 25000003 PHARM REV CODE 250: Performed by: PHYSICIAN ASSISTANT

## 2019-03-09 PROCEDURE — 94640 AIRWAY INHALATION TREATMENT: CPT

## 2019-03-09 PROCEDURE — 80048 BASIC METABOLIC PNL TOTAL CA: CPT | Mod: 91

## 2019-03-09 PROCEDURE — 36415 COLL VENOUS BLD VENIPUNCTURE: CPT

## 2019-03-09 PROCEDURE — 85025 COMPLETE CBC W/AUTO DIFF WBC: CPT

## 2019-03-09 PROCEDURE — 99239 HOSP IP/OBS DSCHRG MGMT >30: CPT | Mod: ,,, | Performed by: INTERNAL MEDICINE

## 2019-03-09 PROCEDURE — 83735 ASSAY OF MAGNESIUM: CPT

## 2019-03-09 PROCEDURE — 27000221 HC OXYGEN, UP TO 24 HOURS

## 2019-03-09 PROCEDURE — 94761 N-INVAS EAR/PLS OXIMETRY MLT: CPT

## 2019-03-09 PROCEDURE — 25000003 PHARM REV CODE 250: Performed by: INTERNAL MEDICINE

## 2019-03-09 PROCEDURE — 84300 ASSAY OF URINE SODIUM: CPT

## 2019-03-09 PROCEDURE — 25000003 PHARM REV CODE 250: Performed by: NURSE PRACTITIONER

## 2019-03-09 PROCEDURE — 82570 ASSAY OF URINE CREATININE: CPT

## 2019-03-09 RX ORDER — LOSARTAN POTASSIUM 25 MG/1
25 TABLET ORAL DAILY
Qty: 90 TABLET | Refills: 3 | Status: SHIPPED | OUTPATIENT
Start: 2019-03-09 | End: 2019-05-13

## 2019-03-09 RX ORDER — TORSEMIDE 20 MG/1
40 TABLET ORAL DAILY
Qty: 60 TABLET | Refills: 11
Start: 2019-03-09 | End: 2019-05-14

## 2019-03-09 RX ORDER — AMLODIPINE BESYLATE 10 MG/1
10 TABLET ORAL DAILY
Qty: 30 TABLET | Refills: 11 | Status: SHIPPED | OUTPATIENT
Start: 2019-03-09 | End: 2019-03-20 | Stop reason: SDUPTHER

## 2019-03-09 RX ORDER — CARVEDILOL 6.25 MG/1
6.25 TABLET ORAL 2 TIMES DAILY
Qty: 60 TABLET | Refills: 11 | Status: SHIPPED | OUTPATIENT
Start: 2019-03-09 | End: 2019-05-13

## 2019-03-09 RX ORDER — CLONIDINE HYDROCHLORIDE 0.1 MG/1
0.1 TABLET ORAL EVERY 12 HOURS
Qty: 60 TABLET | Refills: 6 | Status: SHIPPED | OUTPATIENT
Start: 2019-03-09 | End: 2019-05-13

## 2019-03-09 RX ADMIN — FAMOTIDINE 20 MG: 20 TABLET, FILM COATED ORAL at 10:03

## 2019-03-09 RX ADMIN — TIOTROPIUM BROMIDE 18 MCG: 18 CAPSULE ORAL; RESPIRATORY (INHALATION) at 09:03

## 2019-03-09 RX ADMIN — FLUTICASONE FUROATE AND VILANTEROL TRIFENATATE 1 PUFF: 100; 25 POWDER RESPIRATORY (INHALATION) at 09:03

## 2019-03-09 RX ADMIN — SODIUM CHLORIDE TAB 1 GM 1 G: 1 TAB at 10:03

## 2019-03-09 RX ADMIN — ASPIRIN 81 MG: 81 TABLET, COATED ORAL at 10:03

## 2019-03-09 RX ADMIN — ATORVASTATIN CALCIUM 40 MG: 20 TABLET, FILM COATED ORAL at 10:03

## 2019-03-09 NOTE — NURSING
Discharge instructions reviewed with patient .Explained NA+ tabs over counter medication.Pending transportation.IV removed and belonging packed.

## 2019-03-09 NOTE — PLAN OF CARE
03/09/19 1148   Final Note   Assessment Type Final Discharge Note  (PT STAES SHE MEDELLIN SNOT HAVE HER KEYS - WANTS ME TO COME BACK 1PM - WILL CALL [facility] FOR POSSIBLE TRANSPORT HOME PER PT )   Hospital Follow Up  Appt(s) scheduled? Yes   Discharge plans and expectations educations in teach back method with documentation complete? Yes   Right Care Referral Info   Post Acute Recommendation No Care        adt 30 order [facility] van - pt has new whchair  in room -  2pm      pt also has her keys from her neighbor to go home

## 2019-03-09 NOTE — PROGRESS NOTES
"Nephrology  Progress Note    Admit Date: 3/6/2019   LOS: 3 days     SUBJECTIVE:     Follow-up For:  Hyponatremia    Interval History:    Trends improving.  Uneventful night.       Review of Systems:  Constitutional: No fever or chills  Respiratory: shortness of breath  Cardiovascular: No chest pain or palpitations  Gastrointestinal: No nausea or vomiting  Neurological: No confusion or weakness    OBJECTIVE:     Vital Signs Range (Last 24H):  /67 (BP Location: Right arm, Patient Position: Lying)   Pulse 74   Temp 97.7 °F (36.5 °C) (Oral)   Resp 16   Ht 5' 6" (1.676 m)   Wt 104.8 kg (231 lb 0.7 oz)   SpO2 96%   Breastfeeding? No   BMI 37.29 kg/m²     Temp:  [96.1 °F (35.6 °C)-98.1 °F (36.7 °C)]   Pulse:  [67-82]   Resp:  [14-20]   BP: (103-136)/(61-67)   SpO2:  [95 %-99 %]     I & O (Last 24H):    Intake/Output Summary (Last 24 hours) at 3/9/2019 1042  Last data filed at 3/8/2019 1900  Gross per 24 hour   Intake 648.88 ml   Output --   Net 648.88 ml       Physical Exam:  General appearance: Well developed, well nourished  Eyes:  Conjunctivae/corneas clear. PERRL.  Lungs: Normal respiratory effort,   chronic fine crackles  Heart: Regular/Mikel rate and rhythm, S1, S2 normal, no murmur, rub or dionte.  Abdomen: Soft, non-tender non-distended; bowel sounds normal; no masses,  no organomegaly, obese  Extremities: No cyanosis or clubbing. trace edema but chronic Woody legs.    Skin: Skin color, texture, turgor normal. No rashes or lesions  Neurologic: Normal strength and tone. No focal numbness or weakness          Laboratory Data:  Recent Labs   Lab 03/09/19  0629   WBC 7.62   RBC 3.60*   HGB 10.3*   HCT 31.6*      MCV 88   MCH 28.6   MCHC 32.6       BMP:   Recent Labs   Lab 03/09/19  0629 03/09/19  0805    101   * 127*   K 3.8 3.6   CL 92* 92*   CO2 27 25   BUN 40* 39*   CREATININE 1.0 1.0   CALCIUM 8.5* 8.7   MG 1.9  --      Lab Results   Component Value Date    CALCIUM 8.7 03/09/2019 "    PHOS 2.7 03/07/2019       Lab Results   Component Value Date    CALCIUM 8.7 03/09/2019    PHOS 2.7 03/07/2019       Lab Results   Component Value Date    URICACID 0.8 (L) 03/08/2019       BNP  Recent Labs   Lab 03/06/19  1559   BNP 87       Medications:  Medication list was reviewed and changes noted under Assessment/Plan.    Diagnostic Results:        ASSESSMENT/PLAN:     Resolving Acute on chronic hyponatremia (E87.1):  History of mild SIADH and has been stable with water restriction.  Has receives doses of tolvaptan over previous hospitalizations.  However this current hyponatremia is likely from volume depletion as her urine sodium is low, hypotensive, and poor intake over last few days.    -No need for Samsca at this time.   OK for d/c home. Case d/w Dr. Byrnes. Will be difficult going forward if diuretics are needed.  Consider QOD loop diuretic but BID is too much.  Needs close f/u with her nephrologist/pcp/cardiologist   AVOID THIAZIDE DIURETICS/NSAIDS    Severe hyperuricemia from ? (E79.0):  Received elitek yesterday to avoid uric acid nephroapthy. Uric acid level should be followed as out patient. Diuretics the likely cause or severe hyperuricemia.    Resolved nonoliguric JONATHAN on CKD III from volume depletion (N17.9, N18.3):  Creat back to baseline.  Renally dose meds, avoid nephrotoxins, and monitor I/O's closely.    Severe COPD (J44.9):  Still smokes....     See above

## 2019-03-09 NOTE — PLAN OF CARE
Problem: Adult Inpatient Plan of Care  Goal: Plan of Care Review  Outcome: Ongoing (interventions implemented as appropriate)  Pt denied any sob/ or c/o of pain.  Safety intact. nad noted.  inst to call for assistance

## 2019-03-09 NOTE — PLAN OF CARE
Problem: Adult Inpatient Plan of Care  Goal: Plan of Care Review  Outcome: Ongoing (interventions implemented as appropriate)  Pt on 3 lpm nasal cannula, SpO2 99%. Pt wean to 2 lpm. No respiratory distress noted, prn tx not needed.

## 2019-03-09 NOTE — PLAN OF CARE
Problem: Adult Inpatient Plan of Care  Goal: Plan of Care Review  Outcome: Ongoing (interventions implemented as appropriate)  Patient on room air saturations 96% NC 3 lpm out nose DPI given tolerated well.

## 2019-03-09 NOTE — DISCHARGE SUMMARY
"[facility] Hospital Medicine  Discharge Summary      Patient Name: Nalini Varma  MRN: [id number]  Admission Date: 3/6/2019  Hospital Length of Stay: 3 days  Discharge Date and Time: 3/9/2019  2:39 PM  Attending Physician: No att. providers found   Discharging Provider: HOMAR Austin MD  Primary Care Provider: Yane Sahni MD      HPI:   Ms. Nalini Varma is a 68 y.o. female, with PMH of SIADH, HTN, CHF, COPD, anemia, HLD, chronic respiratory failrue (on home O2), and anxiety, who presented to [facility] ED on 3/6/19 2/2 dizziness. She states this has been ongoing for "a few days" which worsened on the morning of 3/6/19. She states this is not a spinning sensation, but a light headed sensation. She states it is worse with movement, and after standing. Associated symptoms include nausea and decreased appetite, and generalized abdominal pain, decreased urination. She denies vomiting, diarrhea, constipation, dysuria, SOB, chest pain, fever, cough, congestion. She states she started taking losartan two weeks ago. She was evaluated in the ED, and found to have hyponatremia of 115, and low chloride, as well as JONATHAN. She was admitted to inpatient status to the ICU.    * No surgery found *      Hospital Course:   After admission Ms. Varma was started on gentle IVFs with NS and nephrology was consulted. Fluid restriction was placed and she was started on PO sodium supplementation as well. Antihypertensives were held as her blood pressure was normotensive; question whether medication non-adherence may be a factor given her significant reported hypertension as an outpatient and major improvement while in hospital. Her diuretic dosing was adjusted; she will have difficulty in balancing between adequate sodium intake for her SIADH while concurrently avoiding volume overload with her heart failure. With improvement in her sodium level, she was subsequently prepared for discharge.    Consults: " "  Consults (From admission, onward)        Status Ordering Provider     Inpatient consult to Nephrology-[facility]  Once     Provider:  Edmar Jones NP    Completed KIET BECERRA     Inpatient consult to Registered Dietitian/Nutritionist  Once     Provider:  (Not yet assigned)    Completed AMBER LEON              Final Active Diagnoses:    Diagnosis Date Noted POA    PRINCIPAL PROBLEM:  Hyponatremia [E87.1] 12/28/2016 Yes    Heart failure with preserved ejection fraction [I50.30] 08/20/2018 Yes    Chronic respiratory failure with hypoxia [J96.11] 07/15/2018 Yes    MARY (generalized anxiety disorder) [F41.1] 11/14/2016 Yes    Hyperlipidemia [E78.5] 09/20/2016 Yes    Hypertension, benign [I10] 03/16/2016 Yes    Chronic obstructive pulmonary disease [J44.9] 03/16/2016 Yes    Tobacco dependence [F17.200] 03/16/2016 Yes    Anemia [D64.9] 03/16/2016 Yes      Problems Resolved During this Admission:       Discharged Condition: fair    Disposition: Home or Self Care    Follow Up:  Follow-up Information     Yane Sahni MD In 1 week.    Specialty:  Internal Medicine  Why:  post-hospital follow-up  Contact information:  [address] [facility] [address] [phone]             Schedule an appointment as soon as possible for a visit with [facility] - Nephrology.    Specialty:  Nephrology  Why:  follow-up on your SIADH / low sodium  Contact information:  [address]  [facility] [address] [phone]  Additional information:  [facility] [place] - 5th Floor           [facility].    Specialty:  Cardiology  Why:  As scheduled, for follow-up on your heart failure  Contact information:  [address]  [facility] [address] [phone]  Additional information:  3rd floor               Patient Instructions:      WHEELCHAIR FOR HOME USE     Order Specific Question Answer Comments   Hours in W/C per day: 8    Type of Wheelchair: Standard    Size(Width): 20"  " "  Leg Support: Elevating leg rests    Lap Belt: Velcro    Cushion: Basic    Height: 5' 6" (1.676 m)    Weight: 104.8 kg (231 lb 0.7 oz)    Does patient have medical equipment at home? rollator    Does patient have medical equipment at home? oxygen    Does patient have medical equipment at home? bath bench    Length of need (1-99 months): 99    Please check all that apply: Caregiver is capable and willing to operate wheelchair safely.    Please check all that apply: Patient's upper body strength is sufficient for propulsion.    Please check all that apply: Patient mobility limitations cannot be sufficiently resolved by the use of other ambulatory therapies.    Please check all that apply: The patient has significant edema of the lower extremities that requires an elevating leg rest.      Diet Adult Regular     Order Specific Question Answer Comments   Fluid restriction: Fluid - 1000mL    Additional restrictions: Cardiac (Low Na/Chol)      Notify your health care provider if you experience any of the following:  temperature >100.4     Notify your health care provider if you experience any of the following:  difficulty breathing or increased cough     Notify your health care provider if you experience any of the following:  increased confusion or weakness     Notify your health care provider if you experience any of the following:  persistent dizziness, light-headedness, or visual disturbances     Activity as tolerated       Significant Diagnostic Studies:   Recent Results (from the past 360 hour(s))   Basic metabolic panel    Collection Time: 03/20/19  2:30 PM   Result Value Ref Range    Sodium 130 (L) 136 - 145 mmol/L    Potassium 4.0 3.5 - 5.1 mmol/L    Chloride 88 (L) 95 - 110 mmol/L    CO2 28 23 - 29 mmol/L    Glucose 88 70 - 110 mg/dL    BUN, Bld 20 8 - 23 mg/dL    Creatinine 1.3 0.5 - 1.4 mg/dL    Calcium 10.1 8.7 - 10.5 mg/dL    Anion Gap 14 8 - 16 mmol/L    eGFR if African American 48 (A) >60 mL/min/1.73 m^2 "    eGFR if non African American 42 (A) >60 mL/min/1.73 m^2   Urine culture    Collection Time: 03/20/19  4:29 PM   Result Value Ref Range    Urine Culture, Routine No significant growth    Urinalysis    Collection Time: 03/20/19  4:29 PM   Result Value Ref Range    Specimen UA Urine, Unspecified     Color, UA Yellow Yellow, Straw, Sera    Appearance, UA Clear Clear    pH, UA 6.0 5.0 - 8.0    Specific Gravity, UA <=1.005 (A) 1.005 - 1.030    Protein, UA Trace (A) Negative    Glucose, UA Negative Negative    Ketones, UA Negative Negative    Bilirubin (UA) Negative Negative    Occult Blood UA Negative Negative    Nitrite, UA Negative Negative    Urobilinogen, UA Negative <2.0 EU/dL    Leukocytes, UA Negative Negative     Pending Diagnostic Studies:     None         Medications:  Reconciled Home Medications:      Medication List      CHANGE how you take these medications    carvedilol 6.25 MG tablet  Commonly known as:  COREG  Take 1 tablet (6.25 mg total) by mouth 2 (two) times daily. Hold until instructed to resume with digital hypertension program / your cardiologist.  What changed:  additional instructions     cloNIDine 0.1 MG tablet  Commonly known as:  CATAPRES  Take 1 tablet (0.1 mg total) by mouth every 12 (twelve) hours. Hold until instructed to resume with digital hypertension program / your cardiologist.  What changed:  additional instructions     cyanocobalamin (vitamin B-12) 1,000 mcg Tbsr  Take 1,000 mcg by mouth once daily.  What changed:    · when to take this  · reasons to take this     losartan 25 MG tablet  Commonly known as:  COZAAR  Take 1 tablet (25 mg total) by mouth once daily. Hold until instructed to resume with digital hypertension program / your cardiologist.  What changed:  additional instructions     torsemide 20 MG Tab  Commonly known as:  DEMADEX  Take 2 tablets (40 mg total) by mouth once daily.  What changed:  when to take this        CONTINUE taking these medications    ADVAIR HFA  115-21 mcg/actuation Hfaa  Generic drug:  fluticasone-salmeterol  Inhale 2 puffs into the lungs every 12 (twelve) hours.     aspirin 81 MG EC tablet  Commonly known as:  ECOTRIN  Take 81 mg by mouth once daily.     atorvastatin 40 MG tablet  Commonly known as:  LIPITOR  TAKE 1 TABLET BY MOUTH EVERY DAILY     biotin 1 mg Cap  Take by mouth.     famotidine 20 MG tablet  Commonly known as:  PEPCID  TAKE 1 TABLET BY MOUTH TWO TIMES A DAY     fluticasone 50 mcg/actuation nasal spray  Commonly known as:  FLONASE  1 spray by Each Nare route 2 (two) times daily as needed for Rhinitis.     guaiFENesin 600 mg 12 hr tablet  Commonly known as:  MUCINEX  Take 1,200 mg by mouth 2 (two) times daily as needed for Congestion.     HYDROcodone-acetaminophen  mg per tablet  Commonly known as:  NORCO  Take 1 tablet by mouth every 12 (twelve) hours as needed for Pain.     INCRUSE ELLIPTA 62.5 mcg/actuation Dsdv  Generic drug:  umeclidinium  Inhale 1 puff into the lungs once daily. Controller     loratadine 10 mg tablet  Commonly known as:  CLARITIN  Take 10 mg by mouth once daily.     MAGNESIUM ASCORBATE (BULK) MISC     magnesium oxide 400 mg Cap  Take 1 capsule by mouth every 12 (twelve) hours.     VENTOLIN HFA 90 mcg/actuation inhaler  Generic drug:  albuterol  inhale 1-2 puffs as needed every 6 hours for wheezing and shortness of breath     vitamin D 1000 units Tab  Commonly known as:  VITAMIN D3  Take 1,000 Units by mouth once daily.     walker Misc  Commonly known as:  ULTRA-LIGHT ROLLATOR  1 each by Misc.(Non-Drug; Combo Route) route once daily.        STOP taking these medications    amLODIPine 10 MG tablet  Commonly known as:  NORVASC        ASK your doctor about these medications    sodium chloride 1 gram tablet  Take 1 tablet (1 g total) by mouth once daily. for 7 days  Ask about: Should I take this medication?          Indwelling Lines/Drains at time of discharge:   Lines/Drains/Airways          None        Time spent on  the discharge of patient: 35 minutes  Patient was seen and examined on the date of discharge and determined to be suitable for discharge.         HOMAR Austin MD  Department of Hospital Medicine  [facility]

## 2019-03-11 ENCOUNTER — PATIENT OUTREACH (OUTPATIENT)
Dept: OTHER | Facility: OTHER | Age: 69
End: 2019-03-11

## 2019-03-11 ENCOUNTER — TELEPHONE (OUTPATIENT)
Dept: INTERNAL MEDICINE | Facility: CLINIC | Age: 69
End: 2019-03-11

## 2019-03-11 NOTE — TELEPHONE ENCOUNTER
Spoke with Tory with [facility] this pt was in the hospital and is home now and hospital did not resume HH. Need an order to resume HH.

## 2019-03-11 NOTE — PROGRESS NOTES
Last 5 Patient Entered Readings                                      Current 30 Day Average: 135/78     Recent Readings 3/11/2019 2/27/2019 2/19/2019 2/19/2019 2/15/2019    SBP (mmHg) 153 112 135 200 138    DBP (mmHg) 96 67 72 79 78    Pulse 113 72 78 86 84        Hypertension Medications     amLODIPine (NORVASC) 10 MG tablet Take 1 tablet (10 mg total) by mouth once daily. Hold until instructed to resume with digital hypertension program / your cardiologist.    carvedilol (COREG) 6.25 MG tablet Take 1 tablet (6.25 mg total) by mouth 2 (two) times daily. Hold until instructed to resume with digital hypertension program / your cardiologist.    cloNIDine (CATAPRES) 0.1 MG tablet Take 1 tablet (0.1 mg total) by mouth every 12 (twelve) hours. Hold until instructed to resume with digital hypertension program / your cardiologist.    losartan (COZAAR) 25 MG tablet Take 1 tablet (25 mg total) by mouth once daily. Hold until instructed to resume with digital hypertension program / your cardiologist.    torsemide (DEMADEX) 20 MG Tab Take 2 tablets (40 mg total) by mouth once daily.     Called patient for BP follow up. ED to hospital admission for hyponatremia on 3/6/19. All BP medications were stopped due to hypotension in patient. Patient was instructed to begin taking again when directed by digital medicine, cardiologist, PCP. She does continue to take toresemide but takes QD instead of BID.     1) BP exceeds goal of <130/<80. Continue current BP medications. More frequent BP readings. Reviewed last labs  2) Patient knows to contact me with any non-urgent clinical changes or concerns. Go to ED or call [facility] for emergencies.   3) Will defer lifestyle counseling to digital medicine health .   4) Will continue to follow up.     Radha Sol, Pharm.D.   Digital Medicine Clinical Pharmacist  [phone]

## 2019-03-11 NOTE — PHYSICIAN QUERY
PT Name: Nalini Varma  MR #: [id number]     Physician Query Form - Documentation Clarification      CDS/: Caitlin Roblero RN               Contact information: [phone]    This form is a permanent document in the medical record.     Query Date: March 11, 2019    By submitting this query, we are merely seeking further clarification of documentation. Please utilize your independent clinical judgment when addressing the question(s) below.    The Medical record reflects the following:    Supporting Clinical Findings Location in Medical Record   Acute on chronic hyponatremia:    History of mild SIADH and has been stable with water restriction.    Has receives doses of tolvaptan over previous hospitalizations.    However this current hyponatremia is likely from volume depletion as her urine osmolality is mostly dilute, urine sodium is low, hypotensive, and poor intake over last few days.     No need for Samsca at this time.     Will continue with gentle isotonic saline and use salt tablets with pure water restriction. Sodium goal about 123.     Continue to monitor q.4 hours.      3/7 Renal consult   Hyponatremia with underlying SIADH.         3/8 Hosp med note                                                                            Doctor, Please specify diagnosis or diagnoses associated with above clinical findings.    [ X  ] Hyponatremia due to SIADH    [   ] Hyponatremia Not due to SIADH    [   ] Other___________________________________________________                                                                                                        [  ] Clinically Undetermined

## 2019-03-11 NOTE — TELEPHONE ENCOUNTER
----- Message from Carie Antonio sent at 3/11/2019  9:39 AM CDT -----  Contact: Tory rashid/[facility]  Name of Who is Calling: Tory Fernandes    What is the request in detail: Tory rashid/[facility] called to advise that they have attempted to reach the pt and no answer. Want to know did the pt still need home health or did [facility] need to continue the order? Please call to further discuss and advise.     Can the clinic reply by [facility]: No      What Number to Call Back if not in [facility]: Tory rashid/[facility] # [phone]

## 2019-03-12 ENCOUNTER — PATIENT MESSAGE (OUTPATIENT)
Dept: INTERNAL MEDICINE | Facility: CLINIC | Age: 69
End: 2019-03-12

## 2019-03-12 NOTE — TELEPHONE ENCOUNTER
Looks like this Friday has 11:40am appt open  -I blocked spot for pt if she is able to come at that time   - if not then ok to keep currently scheduled appt     - rec she resume losartan 25mg and continue monitoring through dig htn program   - will add back amlodipine next pending bp readings if still elevated     - please notify pt of recs and schedule her for this Friday if this appt works (it may not due to transportion issues)

## 2019-03-13 ENCOUNTER — PATIENT MESSAGE (OUTPATIENT)
Dept: INTERNAL MEDICINE | Facility: CLINIC | Age: 69
End: 2019-03-13

## 2019-03-13 NOTE — TELEPHONE ENCOUNTER
Sent message to inform pt Looks like this Friday has 11:40am appt open  -I blocked spot for pt if she is able to come at that time   - if not then ok to keep currently scheduled appt      - rec she resume losartan 25mg and continue monitoring through dig htn program   - will add back amlodipine next pending bp readings if still elevated      - please notify pt of recs and schedule her for this Friday if this appt works (it may not due to transportion issues)  Ask if she could make this appt and the  referral faxed

## 2019-03-15 ENCOUNTER — TELEPHONE (OUTPATIENT)
Dept: INTERNAL MEDICINE | Facility: CLINIC | Age: 69
End: 2019-03-15

## 2019-03-15 NOTE — TELEPHONE ENCOUNTER
Spoke with pt she explained having trouble with transportation. We were able to reschedule her appt to 3/20/19 at 300 pm. Pt verbalized understanding

## 2019-03-19 ENCOUNTER — PATIENT OUTREACH (OUTPATIENT)
Dept: OTHER | Facility: OTHER | Age: 69
End: 2019-03-19

## 2019-03-19 NOTE — PROGRESS NOTES
Last 5 Patient Entered Readings                                      Current 30 Day Average: 137/88     Recent Readings 3/19/2019 3/19/2019 3/14/2019 3/12/2019 3/11/2019    SBP (mmHg) 144 142 139 142 153    DBP (mmHg) 95 123 100 82 96    [facility] 117 118 97 117 113        Hypertension Medications     losartan (COZAAR) 25 MG tablet Take 1 tablet (25 mg total) by mouth once daily.     torsemide (DEMADEX) 20 MG Tab Take 2 tablets (40 mg total) by mouth once daily.     Called patient for BP follow up and to address high BP alert. Patient contributes elevated BP to having the cuff on too tight. She confirms that she started taking the losartan again.     1) BP exceeds goal of <130/<80. Continue losartan and torsemide.   2) Patient knows to contact me with any non-urgent clinical changes or concerns. Go to ED or call [facility] for emergencies.   3) Will defer lifestyle counseling to digital medicine health .   4) Will continue to follow up. PCP advised to add amlodipine next. Will follow BP readings and do this if needed.     Radha Sol, Pharm.D.  [facility] Digital Medicine Clinical Pharmacist  [phone]

## 2019-03-20 ENCOUNTER — OFFICE VISIT (OUTPATIENT)
Dept: INTERNAL MEDICINE | Facility: CLINIC | Age: 69
End: 2019-03-20
Attending: INTERNAL MEDICINE
Payer: MEDICARE

## 2019-03-20 ENCOUNTER — PATIENT MESSAGE (OUTPATIENT)
Dept: INTERNAL MEDICINE | Facility: CLINIC | Age: 69
End: 2019-03-20

## 2019-03-20 ENCOUNTER — HOSPITAL ENCOUNTER (OUTPATIENT)
Dept: RADIOLOGY | Facility: OTHER | Age: 69
Discharge: HOME | End: 2019-03-20
Attending: INTERNAL MEDICINE
Payer: MEDICARE

## 2019-03-20 VITALS
HEIGHT: 66 IN | SYSTOLIC BLOOD PRESSURE: 154 MMHG | WEIGHT: 234.56 LBS | BODY MASS INDEX: 37.69 KG/M2 | HEART RATE: 127 BPM | DIASTOLIC BLOOD PRESSURE: 77 MMHG | OXYGEN SATURATION: 98 %

## 2019-03-20 DIAGNOSIS — Z09 HOSPITAL DISCHARGE FOLLOW-UP: ICD-10-CM

## 2019-03-20 DIAGNOSIS — M80.00XD AGE-RELATED OSTEOPOROSIS WITH CURRENT PATHOLOGICAL FRACTURE WITH ROUTINE HEALING, SUBSEQUENT ENCOUNTER: ICD-10-CM

## 2019-03-20 DIAGNOSIS — J44.9 CHRONIC OBSTRUCTIVE PULMONARY DISEASE, UNSPECIFIED COPD TYPE: ICD-10-CM

## 2019-03-20 DIAGNOSIS — E66.01 SEVERE OBESITY (BMI 35.0-39.9) WITH COMORBIDITY: ICD-10-CM

## 2019-03-20 DIAGNOSIS — F17.200 TOBACCO DEPENDENCE: ICD-10-CM

## 2019-03-20 DIAGNOSIS — I10 ESSENTIAL HYPERTENSION: Primary | ICD-10-CM

## 2019-03-20 DIAGNOSIS — S32.030D COMPRESSION FRACTURE OF L3 LUMBAR VERTEBRA WITH ROUTINE HEALING: ICD-10-CM

## 2019-03-20 DIAGNOSIS — R91.8 PULMONARY NODULES: ICD-10-CM

## 2019-03-20 DIAGNOSIS — I50.30 HEART FAILURE WITH PRESERVED EJECTION FRACTION: ICD-10-CM

## 2019-03-20 DIAGNOSIS — E87.1 HYPONATREMIA: ICD-10-CM

## 2019-03-20 DIAGNOSIS — E66.01 OBESITY, MORBID (MORE THAN 100 LBS OVER IDEAL WEIGHT OR BMI > 40): ICD-10-CM

## 2019-03-20 DIAGNOSIS — R80.8 OTHER PROTEINURIA: ICD-10-CM

## 2019-03-20 DIAGNOSIS — R60.1 GENERALIZED EDEMA: ICD-10-CM

## 2019-03-20 DIAGNOSIS — I10 HYPERTENSION, BENIGN: ICD-10-CM

## 2019-03-20 DIAGNOSIS — M54.50 ACUTE BILATERAL LOW BACK PAIN WITHOUT SCIATICA: ICD-10-CM

## 2019-03-20 DIAGNOSIS — J96.11 CHRONIC RESPIRATORY FAILURE WITH HYPOXIA: ICD-10-CM

## 2019-03-20 DIAGNOSIS — E04.1 THYROID NODULE: ICD-10-CM

## 2019-03-20 PROCEDURE — 99499 RISK ADDL DX/OHS AUDIT: ICD-10-PCS | Mod: S$GLB,,, | Performed by: INTERNAL MEDICINE

## 2019-03-20 PROCEDURE — 1101F PT FALLS ASSESS-DOCD LE1/YR: CPT | Mod: CPTII,S$GLB,, | Performed by: INTERNAL MEDICINE

## 2019-03-20 PROCEDURE — 3077F PR MOST RECENT SYSTOLIC BLOOD PRESSURE >= 140 MM HG: ICD-10-PCS | Mod: CPTII,S$GLB,, | Performed by: INTERNAL MEDICINE

## 2019-03-20 PROCEDURE — 71250 CT CHEST WITHOUT CONTRAST: ICD-10-PCS | Mod: 26,,, | Performed by: RADIOLOGY

## 2019-03-20 PROCEDURE — 99999 PR PBB SHADOW E&M-EST. PATIENT-LVL IV: CPT | Mod: PBBFAC,,, | Performed by: INTERNAL MEDICINE

## 2019-03-20 PROCEDURE — 3078F DIAST BP <80 MM HG: CPT | Mod: CPTII,S$GLB,, | Performed by: INTERNAL MEDICINE

## 2019-03-20 PROCEDURE — 99214 OFFICE O/P EST MOD 30 MIN: CPT | Mod: S$GLB,,, | Performed by: INTERNAL MEDICINE

## 2019-03-20 PROCEDURE — 99999 PR PBB SHADOW E&M-EST. PATIENT-LVL IV: ICD-10-PCS | Mod: PBBFAC,,, | Performed by: INTERNAL MEDICINE

## 2019-03-20 PROCEDURE — 99499 UNLISTED E&M SERVICE: CPT | Mod: S$GLB,,, | Performed by: INTERNAL MEDICINE

## 2019-03-20 PROCEDURE — 3078F PR MOST RECENT DIASTOLIC BLOOD PRESSURE < 80 MM HG: ICD-10-PCS | Mod: CPTII,S$GLB,, | Performed by: INTERNAL MEDICINE

## 2019-03-20 PROCEDURE — 71250 CT THORAX DX C-: CPT | Mod: 26,,, | Performed by: RADIOLOGY

## 2019-03-20 PROCEDURE — 1101F PR PT FALLS ASSESS DOC 0-1 FALLS W/OUT INJ PAST YR: ICD-10-PCS | Mod: CPTII,S$GLB,, | Performed by: INTERNAL MEDICINE

## 2019-03-20 PROCEDURE — 71250 CT THORAX DX C-: CPT | Mod: TC

## 2019-03-20 PROCEDURE — 99214 PR OFFICE/OUTPT VISIT, EST, LEVL IV, 30-39 MIN: ICD-10-PCS | Mod: S$GLB,,, | Performed by: INTERNAL MEDICINE

## 2019-03-20 PROCEDURE — 3077F SYST BP >= 140 MM HG: CPT | Mod: CPTII,S$GLB,, | Performed by: INTERNAL MEDICINE

## 2019-03-20 RX ORDER — AMLODIPINE BESYLATE 5 MG/1
5 TABLET ORAL DAILY
Qty: 30 TABLET | Refills: 0 | Status: SHIPPED | OUTPATIENT
Start: 2019-03-20 | End: 2019-04-03

## 2019-03-20 RX ORDER — METHOCARBAMOL 500 MG/1
500 TABLET, FILM COATED ORAL EVERY 8 HOURS PRN
Qty: 30 TABLET | Refills: 0 | Status: SHIPPED | OUTPATIENT
Start: 2019-03-20 | End: 2019-03-20 | Stop reason: CLARIF

## 2019-03-20 RX ORDER — TIZANIDINE 2 MG/1
2 TABLET ORAL EVERY 8 HOURS PRN
Qty: 20 TABLET | Refills: 0 | Status: SHIPPED | OUTPATIENT
Start: 2019-03-20 | End: 2019-03-30

## 2019-03-20 NOTE — TELEPHONE ENCOUNTER
methocarb and flexeril not on formulary per epic  Will send in tizanidine prn in its place - please notify

## 2019-03-20 NOTE — PROGRESS NOTES
Subjective:   Patient ID: Nalini Varma is a 68 y.o. female  Chief complaint:   Chief Complaint   Patient presents with    Follow-up       HPI  Her for hospital f/u - lcv 2/20/2019  Went to ER for dizziness and found to be hyponatremic, hypochlorid, JONATHAN and admitted to ICU     Pt with hx of HFpEF (had LHC and RHC 2018), chronic hyponatremia, anasarca, HTN, tobacco dep, COPD on 2L home O2, osteoporosis with L3 compression fracture on alendronate, hx of LESLY 2/2 GIB due to NSAID use, hypoMag, hyponatremia suspected 2/2 SIADH in past,  thyroid nodule: s/p FNA 5/2017 and benign, hx of pulm nodules with repeat CT 2/8/2018 with stable opacicities and rec to repeat CT in 1 year, OA of shoulders and knees with L knee > Right after surgery followed by ortho outside of [facility] - sx currently managed with norco bid.    Received gentle IVFs with NS and nephrology was consulted. Fluid restriction was placed and she was started on PO sodium supplementation as well. Sodium gradually trended upward. was discharged after this - bp meds held   - no orthopnea or cough at this time   - reports weight and swelling stable - lost weight with portion control     Has appt with cards April  appt with renal rescheduled to May after appt rescheduled 2/2 to emergency     HTN:   Losartan was resumed after home readings elevated   She resumed Torsemide 40mg daily (1/2 dose that she was previously taking)  - previously was taking twice daily (2 20mg tabs twice daily), losartan, carvedilol, clonidine, amlodipine all stopped   - following 1L fluid restriction   Today weight 234 down from 238 using same scale in our clinic   Has been eating portion controlled foods and lost weight through this as well   - plan is to add amlodipine back next  - enrolled in dig htn program      Hypomag: nelson mag supp    Reports more back pain over lower back over past 1 week  Located at left lower back - no radiation  No weakness or numbness or incontinence   No  "falls or inciting event   No dysuria, fevers or chills     Osteoporosis:   Holding alendronate 2/2 to fluct renal function - agrees to prolia     Review of Systems    Objective:  Vitals:    03/20/19 1529   BP: (!) 154/77   Pulse: (!) 127   SpO2: 98%   Weight: 106.4 kg (234 lb 9.1 oz)   Height: 5' 6" (1.676 m)     Body mass index is 37.86 kg/m².    Physical Exam   Constitutional: She is oriented to person, place, and time. She appears well-developed and well-nourished.   HENT:   Head: Normocephalic and atraumatic.   Eyes: Conjunctivae and EOM are normal.   Neck: Normal range of motion. Neck supple.   Cardiovascular: Normal rate, regular rhythm and intact distal pulses.   Pulmonary/Chest: Effort normal and breath sounds normal.   Abdominal: Soft. Normal appearance and bowel sounds are normal.   Musculoskeletal: She exhibits edema. She exhibits no tenderness.   Edema up to mid shins B    No spinal ttp   + ttp of left lumbar paraspinal mm  + palpable mm spasm    Neurological: She is alert and oriented to person, place, and time. She has normal strength. Gait normal.   Skin: Skin is warm, dry and intact. No cyanosis. Nails show no clubbing.   Psychiatric: She has a normal mood and affect. Her speech is normal and behavior is normal. Cognition and memory are normal.   Vitals reviewed.      Assessment:  1. Essential hypertension    2. Acute bilateral low back pain without sciatica    3. Other proteinuria     4. Compression fracture of L3 lumbar vertebra with routine healing    5. Heart failure with preserved ejection fraction    6. Hypertension, benign    7. Hyponatremia    8. Obesity, morbid (more than 100 lbs over ideal weight or BMI > 40)    9. Thyroid nodule s/p biopsy 2017 - benign    10. Severe obesity (BMI 35.0-39.9) with comorbidity    11. Tobacco dependence    12. Chronic obstructive pulmonary disease, unspecified COPD type    13. Chronic respiratory failure with hypoxia    14. Generalized edema    15. Age-related " osteoporosis with current pathological fracture with routine healing, subsequent encounter    16. Hospital discharge follow-up        Plan:  Nalini was seen today for follow-up.    Diagnoses and all orders for this visit:    Essential hypertension  Add amlodipine, cont meds    Labs today   Cont dig htn prgoram   Keep appt with cards and renal  lw salt diet and fluid restriction      Acute bilateral low back pain without sciatica  -     Basic metabolic panel; Future  -     X-Ray Lumbar Complete With Flex And Ext; Future  -     Urine culture; Future  -     Urinalysis; Future  avoid nsaids   Trial of mm relaxer - Potential side effects of medication discussed with patient. If the patient has any issues with medication, patient  understands to let me know. Return to clinic and ER prompts given.     Other proteinuria   -     Urine culture; Future    Compression fracture of L3 lumbar vertebra with routine healing  As above     Heart failure with preserved ejection fraction  Sx stable   Cont meds and check labs     Hypertension, benign  As above     Hyponatremia  Check labs     Obesity, morbid (more than 100 lbs over ideal weight or BMI > 40)    - cont diet and exercise  - increase intensity and duration of CV exercise to continue weight loss  - goal wt loss one pound per week  - portion control, healthy choices    Thyroid nodule s/p biopsy 2017 - benign  Due for US later this year     Severe obesity (BMI 35.0-39.9) with comorbidity    Tobacco dependence  Counseled to quit     Chronic obstructive pulmonary disease, unspecified COPD type  Cont inhalers - stable     Chronic respiratory failure with hypoxia  nelson O2    Generalized edema  Improved     Age-related osteoporosis with current pathological fracture with routine healing, subsequent encounter    Hospital discharge follow-up    Other orders  -     Discontinue: methocarbamol (ROBAXIN) 500 MG Tab; Take 1 tablet (500 mg total) by mouth every 8 (eight) hours as needed.  -      amLODIPine (NORVASC) 5 MG tablet; Take 1 tablet (5 mg total) by mouth once daily. Hold until instructed to resume with digital hypertension program / your cardiologist.    Agrees to prolia - alendronate held 2/2 to fluctuating renal function     Health Maintenance   Topic Date Due    Colonoscopy  05/22/2020    DEXA SCAN  08/23/2020    Mammogram  02/20/2021    Lipid Panel  04/24/2023    TETANUS VACCINE  01/29/2025    Hepatitis C Screening  Completed    Zoster Vaccine  Completed    Pneumococcal Vaccine (65+ Low/Medium Risk)  Completed    Influenza Vaccine  Completed

## 2019-03-21 ENCOUNTER — PATIENT MESSAGE (OUTPATIENT)
Dept: INTERNAL MEDICINE | Facility: CLINIC | Age: 69
End: 2019-03-21

## 2019-03-21 ENCOUNTER — TELEPHONE (OUTPATIENT)
Dept: INTERNAL MEDICINE | Facility: CLINIC | Age: 69
End: 2019-03-21

## 2019-03-21 DIAGNOSIS — Z87.891 PERSONAL HISTORY OF NICOTINE DEPENDENCE: ICD-10-CM

## 2019-03-21 DIAGNOSIS — Z12.9 SCREENING FOR CANCER: ICD-10-CM

## 2019-03-21 DIAGNOSIS — I10 HYPERTENSION, BENIGN: Primary | ICD-10-CM

## 2019-03-21 NOTE — TELEPHONE ENCOUNTER
Sent message Bmp ordered - please arrange in 1-2 weeks   - I signed 2 orders with one through home health and one through our lab - please arrange with either [facility] or her home health per pt preference ask if she wanted to set up lab with home health ask who to call to set up

## 2019-03-21 NOTE — TELEPHONE ENCOUNTER
Message sent to pt via my chart with lab results and updates to plan.     Bmp ordered - please arrange in 1-2 weeks   - I signed 2 orders with one through home health and one through our lab - please arrange with either [facility] or her home health per pt preference

## 2019-03-22 ENCOUNTER — PATIENT MESSAGE (OUTPATIENT)
Dept: INTERNAL MEDICINE | Facility: CLINIC | Age: 69
End: 2019-03-22

## 2019-03-22 NOTE — TELEPHONE ENCOUNTER
Called pt and reviewed questions and head CT scan - pt has hx of cataract surgery B   Reviewed CT lung findings - agrees to screening CT scan in 1 year   All questions were answered and pt verbalized understanding of plan.

## 2019-03-26 ENCOUNTER — PATIENT MESSAGE (OUTPATIENT)
Dept: INTERNAL MEDICINE | Facility: CLINIC | Age: 69
End: 2019-03-26

## 2019-03-27 ENCOUNTER — HOSPITAL ENCOUNTER (OUTPATIENT)
Dept: RADIOLOGY | Facility: OTHER | Age: 69
Discharge: HOME | End: 2019-03-27
Attending: INTERNAL MEDICINE
Payer: MEDICARE

## 2019-03-27 DIAGNOSIS — M54.50 ACUTE BILATERAL LOW BACK PAIN WITHOUT SCIATICA: ICD-10-CM

## 2019-03-27 PROCEDURE — 72110 X-RAY EXAM L-2 SPINE 4/>VWS: CPT | Mod: 26,,, | Performed by: RADIOLOGY

## 2019-03-27 PROCEDURE — 72110 XR LUMBAR SPINE 5 VIEW WITH FLEX AND EXT: ICD-10-PCS | Mod: 26,,, | Performed by: RADIOLOGY

## 2019-03-27 PROCEDURE — 72110 X-RAY EXAM L-2 SPINE 4/>VWS: CPT | Mod: TC,FY

## 2019-04-03 ENCOUNTER — PATIENT MESSAGE (OUTPATIENT)
Dept: INTERNAL MEDICINE | Facility: CLINIC | Age: 69
End: 2019-04-03

## 2019-04-03 ENCOUNTER — PATIENT OUTREACH (OUTPATIENT)
Dept: OTHER | Facility: OTHER | Age: 69
End: 2019-04-03

## 2019-04-03 DIAGNOSIS — I10 HYPERTENSION, BENIGN: Primary | ICD-10-CM

## 2019-04-03 DIAGNOSIS — S32.030D COMPRESSION FRACTURE OF L3 LUMBAR VERTEBRA WITH ROUTINE HEALING: ICD-10-CM

## 2019-04-03 RX ORDER — AMLODIPINE BESYLATE 5 MG/1
10 TABLET ORAL DAILY
Qty: 30 TABLET | Refills: 5
Start: 2019-04-03 | End: 2019-05-13 | Stop reason: SDUPTHER

## 2019-04-03 RX ORDER — TIZANIDINE 2 MG/1
2 TABLET ORAL EVERY 8 HOURS PRN
Qty: 60 TABLET | Refills: 0 | Status: SHIPPED | OUTPATIENT
Start: 2019-04-03 | End: 2019-04-30

## 2019-04-03 RX ORDER — HYDROCODONE BITARTRATE AND ACETAMINOPHEN 10; 325 MG/1; MG/1
1 TABLET ORAL EVERY 12 HOURS PRN
Qty: 60 TABLET | Refills: 0 | Status: SHIPPED | OUTPATIENT
Start: 2019-04-03 | End: 2019-05-13 | Stop reason: SDUPTHER

## 2019-04-03 NOTE — PROGRESS NOTES
Last 5 Patient Entered Readings                                      Current 30 Day Average: 147/93     Recent Readings 4/3/2019 4/3/2019 3/30/2019 3/30/2019 3/28/2019    SBP (mmHg) 143 141 149 155 142    DBP (mmHg) 90 102 83 91 84    Pulse 110 114 114 115 109        Hypertension Medications     amLODIPine (NORVASC) 5 MG tablet Take 1 tablet (5 mg total) by mouth once daily.     losartan (COZAAR) 25 MG tablet Take 1 tablet (25 mg total) by mouth once daily.      torsemide (DEMADEX) 20 MG Tab Take 2 tablets (40 mg total) by mouth once daily.     Called patient for BP follow up. She is doing well with no complaints. She re-started amlodipine 5 mg on 3/20/19 as directed by PCP. She says that she hasn't noticed much change in BP.     1) BP exceeds goal of <130/<80. Continue current BP medications, except increase amlodipine to 10 mg QD  2) Patient knows to contact me with any non-urgent clinical changes or concerns. Go to ED or call [facility] for emergencies.   3) Will defer lifestyle counseling to digital medicine health .   4) Patient has appointment with saulo, Dr. Ortez, on 4/15/19. Will continue to follow up.     Radha Sol, Pharm.D.   Digital Medicine Clinical Pharmacist  [phone]

## 2019-04-03 NOTE — TELEPHONE ENCOUNTER
1. Sent in rx for pain med   2. Sent in rx for tizanidine   3. Sent in rx for prolia to specialty pharmacy - rec let them review her insurance to determine the cheapest route and how her insurance company prefers this to be administered - they will update her on the plan     Please notify pt

## 2019-04-04 ENCOUNTER — TELEPHONE (OUTPATIENT)
Dept: PHARMACY | Facility: CLINIC | Age: 69
End: 2019-04-04

## 2019-04-04 NOTE — TELEPHONE ENCOUNTER
Spoke w/ pt and informed her of RX that was sent over   Pt verbally understand and agree to contact pharmacy to confirm how the insurance approve to be administrated

## 2019-04-04 NOTE — TELEPHONE ENCOUNTER
[facility] received prescription for Prolia 60mg SQ every 6 months.  Benefits investigation is required. Contacted the patient to relay information - she verbalized understanding.

## 2019-04-08 ENCOUNTER — TELEPHONE (OUTPATIENT)
Dept: INTERNAL MEDICINE | Facility: CLINIC | Age: 69
End: 2019-04-08

## 2019-04-08 ENCOUNTER — PATIENT MESSAGE (OUTPATIENT)
Dept: INTERNAL MEDICINE | Facility: CLINIC | Age: 69
End: 2019-04-08

## 2019-04-08 NOTE — TELEPHONE ENCOUNTER
Contacted the patient for Prolia 60mg/ml consultation and shipping confirmation. Prolia  pending response from Dr. Sahni's office. Copay $ 8.50 with authorization to charge [facility]. Confirmed 2 patient identifiers - name and . Therapy Appropriate.    Counseled patient on administration directions:  Inject Prolia 60mg/ml into the skin every 6 months. To be administered in MD office.  ?  Patient was counseled on possible side effects:  - Injection site reaction: redness, soreness, itching, bruising, which should resolve within 3-5 days. Monitor for signs of allergic reaction.  - sore throat, runny nose, muscle or joint pain, pain in arms or legs.    DDI: medication list reviewed. No DDIs.  Patient will continue Calcium and Vitamin D supplements as directed by MD.    Patient verbalized understanding. All questions answered and addressed to patients satisfaction. I will f/u with her 1 week after injection to see how she is doing and [facility] will contact her 2-3 weeks before next injection to set up next refill. Patient advised to call [facility] should any questions arise.

## 2019-04-08 NOTE — TELEPHONE ENCOUNTER
----- Message from Nimco Rios PharmD sent at 4/8/2019  9:10 AM CDT -----  Regarding: Prolia Approval  Dr. Sahni and Staff,    [facility] spoke with Ms. Varma this morning in regards to Prolia. It is approved through her insurance for an $8.50 copay. What date/time/address can we send the Prolia to? She is aware you guys will be reaching out to coordinate administration.    Thank you,  Nimco Rios, PharmD  [facility]  [phone]

## 2019-04-08 NOTE — TELEPHONE ENCOUNTER
Sent message asking for a day to give the Prolia injection and call specialty pharmacy and ask for the med to be sent to us on Thursday

## 2019-04-08 NOTE — TELEPHONE ENCOUNTER
Sent message to inform pt that the prolia will be here before 5 pm on Thursday and when does she want to schedule the injection

## 2019-04-11 ENCOUNTER — PATIENT OUTREACH (OUTPATIENT)
Dept: OTHER | Facility: OTHER | Age: 69
End: 2019-04-11

## 2019-04-11 NOTE — PROGRESS NOTES
"Last 5 Patient Entered Readings                                      Current 30 Day Average: 146/92     Recent Readings 4/8/2019 4/3/2019 4/3/2019 3/30/2019 3/30/2019    SBP (mmHg) 142 143 141 149 155    DBP (mmHg) 85 90 102 83 91    Pulse 107 110 114 114 115        Digital Medicine: Health  Follow Up    Patient complains of allergies and sinuses. States that she was fighting turning on the ac, but now she feels much better. Patient states that before she was nauseated, and now she is able to eat again.     Lifestyle Modifications:    1.Dietary Modifications (Sodium intake <2,000mg/day, food labels, dining out):    Continues cooking at home. Patient complains that she can't go out- too expensive. Patient reports that her nephrologist scolded her for using too little salt. Was told that 2 slices of veronica wasn't enough. Patient uses a  and uses "1 or 2 grinds" of salt when she cooks. Cooks with low sodium mushroom soup. Otherwise states that she rarely uses salt. Patient boasts about all the herbs and spices that she uses instead. Patient notes that her downside in cooking is that she uses a lot of unsalted butter.     2.Physical Activity:    Patient states that she would like to be able to walk, but "that's not gonna happen". States that she can't walk or bend. Sent patient sitting exercises to try via mail.     3.Medication Therapy: Patient has been compliant with the medication regimen.    4.Patient has the following medication side effects/concerns:   (Frequency/Alleviating factors/Precipitating factors, etc.)     Follow up with MsLissa Nalini Cassandra Varma completed. No further questions or concerns. Will continue to follow up to achieve health goals.      "

## 2019-04-11 NOTE — PROGRESS NOTES
Last 5 Patient Entered Readings                                      Current 30 Day Average: 146/92     Recent Readings 4/8/2019 4/3/2019 4/3/2019 3/30/2019 3/30/2019    SBP (mmHg) 142 143 141 149 155    DBP (mmHg) 85 90 102 83 91    Pulse 107 110 114 114 115        Left voicemail for follow up

## 2019-04-12 ENCOUNTER — CLINICAL SUPPORT (OUTPATIENT)
Dept: INTERNAL MEDICINE | Facility: CLINIC | Age: 69
End: 2019-04-12
Payer: MEDICARE

## 2019-04-12 PROCEDURE — 99999 PR PBB SHADOW E&M-EST. PATIENT-LVL I: CPT | Mod: PBBFAC,,,

## 2019-04-12 PROCEDURE — 99999 PR PBB SHADOW E&M-EST. PATIENT-LVL I: ICD-10-PCS | Mod: PBBFAC,,,

## 2019-04-12 NOTE — PROGRESS NOTES
Prolia 1 mL was administered subcutaneously at the right quadrant of abdomen.  SILVIA [id number]  Lot [id number]  Exp 06/21

## 2019-04-15 ENCOUNTER — OFFICE VISIT (OUTPATIENT)
Dept: CARDIOLOGY | Facility: CLINIC | Age: 69
End: 2019-04-15
Payer: MEDICARE

## 2019-04-15 VITALS
BODY MASS INDEX: 38.98 KG/M2 | HEART RATE: 100 BPM | SYSTOLIC BLOOD PRESSURE: 124 MMHG | HEIGHT: 65 IN | DIASTOLIC BLOOD PRESSURE: 62 MMHG | WEIGHT: 233.94 LBS

## 2019-04-15 DIAGNOSIS — F17.200 TOBACCO DEPENDENCE: ICD-10-CM

## 2019-04-15 DIAGNOSIS — J96.11 CHRONIC RESPIRATORY FAILURE WITH HYPOXIA: ICD-10-CM

## 2019-04-15 DIAGNOSIS — E87.1 HYPONATREMIA: ICD-10-CM

## 2019-04-15 DIAGNOSIS — I50.30 HEART FAILURE WITH PRESERVED EJECTION FRACTION, BORDERLINE, CLASS III: Primary | ICD-10-CM

## 2019-04-15 DIAGNOSIS — E87.6 HYPOKALEMIA: ICD-10-CM

## 2019-04-15 DIAGNOSIS — I10 HYPERTENSION, BENIGN: ICD-10-CM

## 2019-04-15 DIAGNOSIS — E78.5 HYPERLIPIDEMIA, UNSPECIFIED HYPERLIPIDEMIA TYPE: ICD-10-CM

## 2019-04-15 DIAGNOSIS — J44.9 CHRONIC OBSTRUCTIVE PULMONARY DISEASE, UNSPECIFIED COPD TYPE: ICD-10-CM

## 2019-04-15 PROCEDURE — 99213 OFFICE O/P EST LOW 20 MIN: CPT | Mod: S$GLB,,, | Performed by: INTERNAL MEDICINE

## 2019-04-15 PROCEDURE — 99213 PR OFFICE/OUTPT VISIT, EST, LEVL III, 20-29 MIN: ICD-10-PCS | Mod: S$GLB,,, | Performed by: INTERNAL MEDICINE

## 2019-04-15 PROCEDURE — 1101F PR PT FALLS ASSESS DOC 0-1 FALLS W/OUT INJ PAST YR: ICD-10-PCS | Mod: CPTII,S$GLB,, | Performed by: INTERNAL MEDICINE

## 2019-04-15 PROCEDURE — 99499 RISK ADDL DX/OHS AUDIT: ICD-10-PCS | Mod: S$GLB,,, | Performed by: INTERNAL MEDICINE

## 2019-04-15 PROCEDURE — 99999 PR PBB SHADOW E&M-EST. PATIENT-LVL III: ICD-10-PCS | Mod: PBBFAC,,, | Performed by: INTERNAL MEDICINE

## 2019-04-15 PROCEDURE — 99999 PR PBB SHADOW E&M-EST. PATIENT-LVL III: CPT | Mod: PBBFAC,,, | Performed by: INTERNAL MEDICINE

## 2019-04-15 PROCEDURE — 3078F PR MOST RECENT DIASTOLIC BLOOD PRESSURE < 80 MM HG: ICD-10-PCS | Mod: CPTII,S$GLB,, | Performed by: INTERNAL MEDICINE

## 2019-04-15 PROCEDURE — 99499 UNLISTED E&M SERVICE: CPT | Mod: S$GLB,,, | Performed by: INTERNAL MEDICINE

## 2019-04-15 PROCEDURE — 3074F SYST BP LT 130 MM HG: CPT | Mod: CPTII,S$GLB,, | Performed by: INTERNAL MEDICINE

## 2019-04-15 PROCEDURE — 3074F PR MOST RECENT SYSTOLIC BLOOD PRESSURE < 130 MM HG: ICD-10-PCS | Mod: CPTII,S$GLB,, | Performed by: INTERNAL MEDICINE

## 2019-04-15 PROCEDURE — 3078F DIAST BP <80 MM HG: CPT | Mod: CPTII,S$GLB,, | Performed by: INTERNAL MEDICINE

## 2019-04-15 PROCEDURE — 1101F PT FALLS ASSESS-DOCD LE1/YR: CPT | Mod: CPTII,S$GLB,, | Performed by: INTERNAL MEDICINE

## 2019-04-15 NOTE — PROGRESS NOTES
If Chart has been dictated using voice recognition software.  It is not been reviewed carefully for any transcriptional errors due to this technology.   Subjective:   Patient ID:  Nalini Varma is a 68 y.o. female who presents for follow-up of No chief complaint on file.      HPI:  Patient with  nonischemic heart failure with normal ejection fraction, SIADH, Chronic obstructive pulmonary disease, Hypertension, Tobacco dependence, and Hyperlipidemia.  Patient was hospitalized 0 6-009-March -2019 for hyponatremia treated with sodium supplementation and fluid restriction.    Digital Hypertension results      Patient's dyspnea is variable as is edema.  Feeling better than when last seen.  Has more energy and appetite. Able to sleep better.  Weight down by 6 kg since last visit.  Patient still smoking.    Past Medical History:   Diagnosis Date    Allergy     Anemia due to gastrointestinal blood loss     LESLY from gastric ulcer due to chronic nsaid use    Anxiety     Arthritis     CKD (chronic kidney disease) stage 3, GFR 30-59 ml/min     followed by [facility] study - recieved labs from 2017 and 2/2018    Gastric ulcer     H/O knee surgery 2001    right    Heart failure with preserved ejection fraction 8/20/2018    Hx of psychiatric care     Hyperlipidemia     Hypertension     Hyponatremia     Psychiatric problem     Therapy     Tobacco abuse        Outpatient Medications Prior to Visit   Medication Sig Dispense Refill    albuterol (VENTOLIN HFA) 90 mcg/actuation inhaler inhale 1-2 puffs as needed every 6 hours for wheezing and shortness of breath 18 g 11    amLODIPine (NORVASC) 5 MG tablet Take 2 tablets (10 mg total) by mouth once daily. 30 tablet 5    aspirin (ECOTRIN) 81 MG EC tablet Take 81 mg by mouth once daily.      atorvastatin (LIPITOR) 40 MG tablet TAKE 1 TABLET BY MOUTH EVERY DAILY 90 tablet 3    biotin 1 mg Cap Take by mouth.      cyanocobalamin 1,000 mcg TbSR Take 1,000 mcg by mouth  once daily. (Patient taking differently: Take 1,000 mcg by mouth daily as needed. ) 90 tablet 1    denosumab (PROLIA) 60 mg/mL Syrg Inject 1 mL (60 mg total) into the skin every 6 (six) months. 2 mL 0    famotidine (PEPCID) 20 MG tablet TAKE 1 TABLET BY MOUTH TWO TIMES A  tablet 3    fluticasone (FLONASE) 50 mcg/actuation nasal spray 1 spray by Each Nare route 2 (two) times daily as needed for Rhinitis.      fluticasone-salmeterol (ADVAIR HFA) 115-21 mcg/actuation HFAA Inhale 2 puffs into the lungs every 12 (twelve) hours. 36 g 3    guaiFENesin (MUCINEX) 600 mg 12 hr tablet Take 1,200 mg by mouth 2 (two) times daily as needed for Congestion.      HYDROcodone-acetaminophen (NORCO)  mg per tablet Take 1 tablet by mouth every 12 (twelve) hours as needed for Pain. 60 tablet 0    loratadine (CLARITIN) 10 mg tablet Take 10 mg by mouth once daily.      losartan (COZAAR) 25 MG tablet Take 1 tablet (25 mg total) by mouth once daily. Hold until instructed to resume with digital hypertension program / your cardiologist. 90 tablet 3    MAGNESIUM ASCORBATE, BULK, MISC       magnesium oxide 400 mg Cap Take 1 capsule by mouth every 12 (twelve) hours.      tiZANidine (ZANAFLEX) 2 MG tablet Take 1 tablet (2 mg total) by mouth every 8 (eight) hours as needed. 60 tablet 0    torsemide (DEMADEX) 20 MG Tab Take 2 tablets (40 mg total) by mouth once daily. (Patient taking differently: Take 20 mg by mouth once daily. ) 60 tablet 11    umeclidinium (INCRUSE ELLIPTA) 62.5 mcg/actuation DsDv Inhale 1 puff into the lungs once daily. Controller 90 each 3    vitamin D 1000 units Tab Take 1,000 Units by mouth once daily.      walker (ULTRA-LIGHT ROLLATOR) Misc 1 each by Misc.(Non-Drug; Combo Route) route once daily. 1 each 0    carvedilol (COREG) 6.25 MG tablet Take 1 tablet (6.25 mg total) by mouth 2 (two) times daily. Hold until instructed to resume with digital hypertension program / your cardiologist. 60 tablet  "11    cloNIDine (CATAPRES) 0.1 MG tablet Take 1 tablet (0.1 mg total) by mouth every 12 (twelve) hours. Hold until instructed to resume with digital hypertension program / your cardiologist. 60 tablet 6     No facility-administered medications prior to visit.        ROS - No change from prior visit in neurologic, respiratory, endocrine, GI, hematologic, or constitutional complaints   Objective:   Physical Exam   Constitutional: She is oriented to person, place, and time. She appears well-developed and well-nourished.   /62 (BP Location: Left arm, Patient Position: Sitting, BP Method: Large (Automatic))   Pulse 100   Ht 5' 5" (1.651 m)   Wt 106.1 kg (233 lb 14.5 oz)   BMI 38.92 kg/m²    Neck: Neck supple. No JVD present. Carotid bruit is not present. No thyromegaly present.   Cardiovascular: Regular rhythm, S1 normal, S2 normal and intact distal pulses.  Occasional extrasystoles are present. Tachycardia present. Exam reveals S4. Exam reveals no friction rub.   Murmur heard.   Scratchy systolic murmur is present with a grade of 2/6 at the upper right sternal border.   Holosystolic murmur of grade 2/6 is also present at the apex radiating to the axilla.  Pulmonary/Chest: Breath sounds normal. She has no wheezes. She has no rales.   Abdominal: Soft. Bowel sounds are normal. There is no hepatosplenomegaly. There is no tenderness.   Musculoskeletal: She exhibits edema (2+ tense bilateral pre tibial edema).   Neurological: She is alert and oriented to person, place, and time. She has normal strength.   Skin: No cyanosis. Nails show no clubbing.         Lab Results   Component Value Date     (L) 03/27/2019    K 4.3 03/27/2019    BUN 14 03/27/2019    CREATININE 1.1 03/27/2019     03/27/2019    HGBA1C 5.3 12/11/2018    CHOL 136 04/24/2018    HDL 74 04/24/2018    LDLCALC 49.2 (L) 04/24/2018    TRIG 64 04/24/2018    CHOLHDL 54.4 (H) 04/24/2018    HGB 10.3 (L) 03/09/2019    HCT 31.6 (L) 03/09/2019    PLT " 292 03/09/2019    INR 0.9 12/10/2018       Assessment:     1. Heart failure with preserved ejection fraction, borderline, class III    2. Chronic obstructive pulmonary disease, unspecified COPD type    3. Hypertension, benign    4. Tobacco dependence    5. Hyperlipidemia, unspecified hyperlipidemia type    6. Hyponatremia    7. Hypokalemia    8. Chronic respiratory failure with hypoxia      Patient appears to be relatively stable and clearly marked improved from her hospitalization.  She states she feels better than last time she was seen.  Therefore, no change in her therapy will be made at this time.  It should be noted that the patient continues to smoke, probably somewhat heavily.  It is unlikely that any major positive change can be made the patient's condition as long as she continues to inhale tobacco smoke.  Patient should continue on her current medical regimen.  Patient should return for re-evaluation in 4 months.  Plan:     Diagnoses and all orders for this visit:    Heart failure with preserved ejection fraction, borderline, class III    Chronic obstructive pulmonary disease, unspecified COPD type    Hypertension, benign    Tobacco dependence    Hyperlipidemia, unspecified hyperlipidemia type    Hyponatremia    Hypokalemia    Chronic respiratory failure with hypoxia          Parvez Ortez MD  Consultative Cardiology

## 2019-04-16 ENCOUNTER — TELEPHONE (OUTPATIENT)
Dept: NEPHROLOGY | Facility: CLINIC | Age: 69
End: 2019-04-16

## 2019-04-16 NOTE — TELEPHONE ENCOUNTER
" I mentioned to her that we will call her when the nxt aval. appt Comes about. "dont even bother , she will have to find another who works because apparently he doesnt" she mentioned its been almost 5 mo since her surgery and was suppose to have a fu right after that was cancelled then, she said she will be looking for a new nephrologist..  "

## 2019-04-17 ENCOUNTER — PATIENT OUTREACH (OUTPATIENT)
Dept: OTHER | Facility: OTHER | Age: 69
End: 2019-04-17

## 2019-04-17 NOTE — PROGRESS NOTES
Last 5 Patient Entered Readings                                      Current 30 Day Average: 147/93     Recent Readings 4/8/2019 4/3/2019 4/3/2019 3/30/2019 3/30/2019    SBP (mmHg) 142 143 141 149 155    DBP (mmHg) 85 90 102 83 91    Pulse 107 110 114 114 115        Hypertension Medications     amLODIPine (NORVASC) 5 MG tablet Take 2 tablets (10 mg total) by mouth once daily.    torsemide (DEMADEX) 20 MG Tab Take 2 tablets (40 mg total) by mouth once daily.     Called patient for BP follow up after increasing amlodipine. She is doing well today with no complaints. She notes occasional edema, but says this is not a new issue and has not gotten worse since increasing amlodipine. She saw cardiologist, Dr. Ortez, on 4/15/19 and no medication changes were made. BP in his office was 120s/60s.     1) BP exceeds goal of <130/<80. Continue current BP medications. Requested more frequent BP readings.   2) Patient knows to contact me with any non-urgent clinical changes or concerns. Go to ED or call [facility] for emergencies.   3) Will defer lifestyle counseling to digital medicine health .   4) Will continue to follow up.     Radha Sol, Pharm.D.   Digital Medicine Clinical Pharmacist  [phone]

## 2019-04-22 ENCOUNTER — PATIENT MESSAGE (OUTPATIENT)
Dept: INTERNAL MEDICINE | Facility: CLINIC | Age: 69
End: 2019-04-22

## 2019-04-22 DIAGNOSIS — E87.1 HYPONATREMIA: Primary | ICD-10-CM

## 2019-04-22 NOTE — TELEPHONE ENCOUNTER
Colby Jaime and Eduarda are great- please help schedule   - yes I do rec that she f/u with renal for the low sodium/hyponatremia

## 2019-05-10 ENCOUNTER — PATIENT OUTREACH (OUTPATIENT)
Dept: OTHER | Facility: OTHER | Age: 69
End: 2019-05-10

## 2019-05-10 NOTE — PROGRESS NOTES
Last 5 Patient Entered Readings                                      Current 30 Day Average: 142/98     Recent Readings 5/2/2019 5/2/2019 5/2/2019 4/26/2019 4/18/2019    SBP (mmHg) 155 149 151 139 131    DBP (mmHg) 91 111 105 93 90    Pulse 100 98 101 102 109        Hypertension Medications     amLODIPine (NORVASC) 5 MG tablet Take 2 tablets (10 mg total) by mouth once daily.    torsemide (DEMADEX) 20 MG Tab Take 2 tablets (40 mg total) by mouth once daily.     Called patient for BP follow up. She says that she is under a lot of stress right not related to where she is living. She contributes this to the elevated BP readings. She says things are settling down and she sees PCP next week.     1) BP exceeds goal of <130/<80. BP 4/15/19 in cards clinic 120/60s. Continue current BP medications.   2) Patient knows to contact me with any non-urgent clinical changes or concerns. Go to ED or call [facility] for emergencies.   3) Will defer lifestyle counseling to digital medicine health .   4) Will continue to follow up.     Radha Ayala, Pharm.D.  [facility] Digital Medicine Clinical Pharmacist  [phone]

## 2019-05-13 ENCOUNTER — TELEPHONE (OUTPATIENT)
Dept: NEPHROLOGY | Facility: CLINIC | Age: 69
End: 2019-05-13

## 2019-05-13 ENCOUNTER — PATIENT MESSAGE (OUTPATIENT)
Dept: INTERNAL MEDICINE | Facility: CLINIC | Age: 69
End: 2019-05-13

## 2019-05-13 DIAGNOSIS — I10 HYPERTENSION, BENIGN: ICD-10-CM

## 2019-05-13 DIAGNOSIS — S32.030D COMPRESSION FRACTURE OF L3 LUMBAR VERTEBRA WITH ROUTINE HEALING: ICD-10-CM

## 2019-05-13 RX ORDER — HYDROCODONE BITARTRATE AND ACETAMINOPHEN 10; 325 MG/1; MG/1
1 TABLET ORAL EVERY 12 HOURS PRN
Qty: 60 TABLET | Refills: 0 | Status: SHIPPED | OUTPATIENT
Start: 2019-05-13 | End: 2019-06-24 | Stop reason: SDUPTHER

## 2019-05-13 RX ORDER — AMLODIPINE BESYLATE 10 MG/1
10 TABLET ORAL DAILY
Qty: 90 TABLET | Refills: 3
Start: 2019-05-13 | End: 2019-05-14

## 2019-05-13 NOTE — TELEPHONE ENCOUNTER
----- Message from Brandie Meza sent at 5/13/2019 10:21 AM CDT -----  Contact: pt  Pt was a [facility] discharge in December     Had several appts scheduled with Dr Hernández  That were canceled  And booked out  -         Pt is very  very upset   Has never been seen  After discharge       Was a Dr Gee pt put was scheduled with Dr Hernández  Had seen him once before hospital      Pt wants to see Dr Jaime  Or    Dr Lerner       Patient needs an appt between 10:00 up to 3:00 appt    -       Prefers afternoon if possible     NEEDS 4 DAYS  To notify transportation      Please call   Ph  [phone]    Thanks

## 2019-05-14 DIAGNOSIS — R60.9 EDEMA, UNSPECIFIED TYPE: ICD-10-CM

## 2019-05-14 RX ORDER — AMLODIPINE BESYLATE 10 MG/1
10 TABLET ORAL DAILY
Qty: 90 TABLET | Refills: 3 | Status: SHIPPED | OUTPATIENT
Start: 2019-05-14 | End: 2020-04-01 | Stop reason: SDUPTHER

## 2019-05-14 RX ORDER — TORSEMIDE 20 MG/1
40 TABLET ORAL DAILY
Qty: 180 TABLET | Refills: 3 | Status: SHIPPED | OUTPATIENT
Start: 2019-05-14 | End: 2020-06-19 | Stop reason: SDUPTHER

## 2019-05-15 ENCOUNTER — OFFICE VISIT (OUTPATIENT)
Dept: INTERNAL MEDICINE | Facility: CLINIC | Age: 69
End: 2019-05-15
Attending: INTERNAL MEDICINE
Payer: MEDICARE

## 2019-05-15 VITALS
OXYGEN SATURATION: 97 % | HEIGHT: 65 IN | WEIGHT: 234.81 LBS | BODY MASS INDEX: 39.12 KG/M2 | DIASTOLIC BLOOD PRESSURE: 78 MMHG | SYSTOLIC BLOOD PRESSURE: 157 MMHG | HEART RATE: 125 BPM

## 2019-05-15 DIAGNOSIS — I10 HYPERTENSION, BENIGN: Primary | ICD-10-CM

## 2019-05-15 DIAGNOSIS — F17.200 TOBACCO DEPENDENCE: ICD-10-CM

## 2019-05-15 DIAGNOSIS — Z99.81 ON HOME OXYGEN THERAPY: ICD-10-CM

## 2019-05-15 DIAGNOSIS — E87.1 HYPONATREMIA: ICD-10-CM

## 2019-05-15 DIAGNOSIS — F11.20 UNCOMPLICATED OPIOID DEPENDENCE: ICD-10-CM

## 2019-05-15 DIAGNOSIS — J44.9 CHRONIC OBSTRUCTIVE PULMONARY DISEASE, UNSPECIFIED COPD TYPE: ICD-10-CM

## 2019-05-15 DIAGNOSIS — G89.28 CHRONIC PAIN FOLLOWING SURGERY OR PROCEDURE: ICD-10-CM

## 2019-05-15 DIAGNOSIS — I50.30 HEART FAILURE WITH PRESERVED EJECTION FRACTION, BORDERLINE, CLASS III: ICD-10-CM

## 2019-05-15 DIAGNOSIS — E83.42 HYPOMAGNESEMIA: ICD-10-CM

## 2019-05-15 PROCEDURE — 99999 PR PBB SHADOW E&M-EST. PATIENT-LVL III: CPT | Mod: PBBFAC,,, | Performed by: INTERNAL MEDICINE

## 2019-05-15 PROCEDURE — 1101F PR PT FALLS ASSESS DOC 0-1 FALLS W/OUT INJ PAST YR: ICD-10-PCS | Mod: CPTII,S$GLB,, | Performed by: INTERNAL MEDICINE

## 2019-05-15 PROCEDURE — 1101F PT FALLS ASSESS-DOCD LE1/YR: CPT | Mod: CPTII,S$GLB,, | Performed by: INTERNAL MEDICINE

## 2019-05-15 PROCEDURE — 3077F PR MOST RECENT SYSTOLIC BLOOD PRESSURE >= 140 MM HG: ICD-10-PCS | Mod: CPTII,S$GLB,, | Performed by: INTERNAL MEDICINE

## 2019-05-15 PROCEDURE — 99214 OFFICE O/P EST MOD 30 MIN: CPT | Mod: S$GLB,,, | Performed by: INTERNAL MEDICINE

## 2019-05-15 PROCEDURE — 3078F DIAST BP <80 MM HG: CPT | Mod: CPTII,S$GLB,, | Performed by: INTERNAL MEDICINE

## 2019-05-15 PROCEDURE — 3078F PR MOST RECENT DIASTOLIC BLOOD PRESSURE < 80 MM HG: ICD-10-PCS | Mod: CPTII,S$GLB,, | Performed by: INTERNAL MEDICINE

## 2019-05-15 PROCEDURE — 3077F SYST BP >= 140 MM HG: CPT | Mod: CPTII,S$GLB,, | Performed by: INTERNAL MEDICINE

## 2019-05-15 PROCEDURE — 99214 PR OFFICE/OUTPT VISIT, EST, LEVL IV, 30-39 MIN: ICD-10-PCS | Mod: S$GLB,,, | Performed by: INTERNAL MEDICINE

## 2019-05-15 PROCEDURE — 99499 RISK ADDL DX/OHS AUDIT: ICD-10-PCS | Mod: S$GLB,,, | Performed by: INTERNAL MEDICINE

## 2019-05-15 PROCEDURE — 99999 PR PBB SHADOW E&M-EST. PATIENT-LVL III: ICD-10-PCS | Mod: PBBFAC,,, | Performed by: INTERNAL MEDICINE

## 2019-05-15 PROCEDURE — 99499 UNLISTED E&M SERVICE: CPT | Mod: S$GLB,,, | Performed by: INTERNAL MEDICINE

## 2019-05-15 RX ORDER — LOSARTAN POTASSIUM 50 MG/1
50 TABLET ORAL DAILY
Qty: 90 TABLET | Refills: 0 | Status: SHIPPED | OUTPATIENT
Start: 2019-05-15 | End: 2019-08-30 | Stop reason: SDUPTHER

## 2019-05-15 NOTE — PROGRESS NOTES
Subjective:   Patient ID: Nalini Varma is a 68 y.o. female  Chief complaint:   Chief Complaint   Patient presents with    Hypertension     f/u       HPI  Pt with hx of HFpEF (had LHC and RHC 2018), chronic hyponatremia, anasarca, HTN, tobacco dep, COPD on 2L home O2, osteoporosis with L3 compression fracture on alendronate, hx of LESLY 2/2 GIB due to NSAID use, hypoMag, hyponatremia suspected 2/2 SIADH in past,  thyroid nodule: s/p FNA 5/2017 and benign, hx of pulm nodules with repeat CT 3/20/2019 with stable opacicities that do not require additional f/u, OA of shoulders and knees with L knee > Right after surgery followed by ortho outside of [facility] - s currently managed with norco bid.    Diastolic heart failure:   Pt recently increased torsemide from 20 to 40mg about 2 weeks ago after started retaining some fliud   - weight then returned to baseline and stable since then   - inc stress as her condo that is renting was sold and unsure going to move   - weight stable at 234 on our scale   - bp more elavated over past month   - at some point stopped losartan and agrees to resume     HTN:   Enrolled in dig htn prog  Currently on:   toresmide 40mg  Amlodipine 10mg   Was taking losartan after she resumed when bp was elevated but at some point stopped this     At lcv:   Losartan was resumed after home readings elevated   She resumed Torsemide 40mg daily (1/2 dose that she was previously taking)  - previously was taking twice daily (2 20mg tabs twice daily), losartan, carvedilol, clonidine, amlodipine all stopped   - following 1L fluid restriction on most days  Today weight 234 down from 238 using same scale in our clinic   Has been eating portion controlled foods and lost weight through this as well   - plan is to add amlodipine back next  - enrolled in dig htn program     tob dependence: still smoking but has appt with tob cess!!!!    pulm nodules: was eval by pulm - did complete repeat CT scan of chest and  "stable 3/2019 - nodules are stable and do not need further following    COPD on 2L home O2: still smoking   Taking inhalers and compliant and tolerating   - no changes or decline in breathing status     Pt also here for 3 month f/u for chronic pain mgmt. Taking norco twice daily.  Reports only mildly helps with pain to make it tolerable.   Was Seen by ortho Dr. Mendoza at  on 12/20 rec hold off on surgery per pt - he also dx her with left shoulder OA seen on xray  - pt may have to go through [facility] for knee surgery   Checked  and consistent     Thyroid US: stable us 8/2018    Osteoporosis: due for dexa 8/2019 - had prolia 4/12/2019    Review of Systems    Objective:  Vitals:    05/15/19 1331   BP: (!) 157/78   Pulse: (!) 125   SpO2: 97%   Weight: 106.5 kg (234 lb 12.6 oz)   Height: 5' 5" (1.651 m)     Body mass index is 39.07 kg/m².    Physical Exam   Constitutional: She is oriented to person, place, and time. She appears well-developed and well-nourished.   HENT:   Head: Normocephalic and atraumatic.   NC in place    Eyes: Conjunctivae and EOM are normal.   Neck: Normal range of motion. Neck supple.   Cardiovascular: Normal rate, regular rhythm and intact distal pulses.   Pulmonary/Chest: Effort normal and breath sounds normal.   Abdominal: Soft. Normal appearance and bowel sounds are normal.   Musculoskeletal: She exhibits edema. She exhibits no tenderness.   Neurological: She is alert and oriented to person, place, and time. She has normal strength. Gait normal.   Skin: Skin is warm, dry and intact. No cyanosis. Nails show no clubbing.   Psychiatric: She has a normal mood and affect. Her speech is normal and behavior is normal. Cognition and memory are normal.   Vitals reviewed.      Assessment:  1. Hypertension, benign    2. On home oxygen therapy    3. Hyponatremia    4. Hypomagnesemia    5. Heart failure with preserved ejection fraction, borderline, class III    6. Chronic pain following surgery or " procedure    7. Chronic obstructive pulmonary disease, unspecified COPD type    8. Tobacco dependence    9. Uncomplicated opioid dependence        Plan:  Nalini was seen today for hypertension.    Diagnoses and all orders for this visit:    Hypertension, benign  -     Basic metabolic panel; Future  Cont meds   Resume arb - check bmp in 1-2 weeks     On home oxygen therapy  stble - stop smoking!    Hyponatremia  Stable - follow up with renal     Hypomagnesemia  Stable, cont oral mag suppl    Heart failure with preserved ejection fraction, borderline, class III  Stable - cont daily weights, low salt diet   Diuretic  Needs better bp control    Chronic pain following surgery or procedure  Stable - cont med    reviewed and consistent     Chronic obstructive pulmonary disease, unspecified COPD type  Stable   Cont meds and home oxygen   Stop smoking!    Tobacco dependence  As above     Uncomplicated opioid dependence  As above     Other orders  -     losartan (COZAAR) 50 MG tablet; Take 1 tablet (50 mg total) by mouth once daily.    Health Maintenance   Topic Date Due    Influenza Vaccine  08/01/2019    Colonoscopy  05/22/2020    DEXA SCAN  08/23/2020    Mammogram  02/20/2021    Lipid Panel  04/24/2023    TETANUS VACCINE  01/29/2025    Hepatitis C Screening  Completed    Pneumococcal Vaccine (65+ Low/Medium Risk)  Completed

## 2019-05-16 PROBLEM — Z99.81 ON HOME OXYGEN THERAPY: Status: ACTIVE | Noted: 2019-05-16

## 2019-05-21 ENCOUNTER — LAB VISIT (OUTPATIENT)
Dept: LAB | Facility: OTHER | Age: 69
End: 2019-05-21
Attending: INTERNAL MEDICINE
Payer: MEDICARE

## 2019-05-21 ENCOUNTER — CLINICAL SUPPORT (OUTPATIENT)
Dept: SMOKING CESSATION | Facility: CLINIC | Age: 69
End: 2019-05-21
Payer: COMMERCIAL

## 2019-05-21 DIAGNOSIS — I10 HYPERTENSION, BENIGN: ICD-10-CM

## 2019-05-21 DIAGNOSIS — F17.210 HEAVY CIGARETTE SMOKER (20-39 PER DAY): Primary | ICD-10-CM

## 2019-05-21 LAB
ANION GAP SERPL CALC-SCNC: 16 MMOL/L (ref 8–16)
BUN SERPL-MCNC: 19 MG/DL (ref 8–23)
CALCIUM SERPL-MCNC: 9.5 MG/DL (ref 8.7–10.5)
CHLORIDE SERPL-SCNC: 95 MMOL/L (ref 95–110)
CO2 SERPL-SCNC: 24 MMOL/L (ref 23–29)
CREAT SERPL-MCNC: 1.3 MG/DL (ref 0.5–1.4)
EST. GFR  (AFRICAN AMERICAN): 48 ML/MIN/1.73 M^2
EST. GFR  (NON AFRICAN AMERICAN): 42 ML/MIN/1.73 M^2
GLUCOSE SERPL-MCNC: 99 MG/DL (ref 70–110)
POTASSIUM SERPL-SCNC: 3.4 MMOL/L (ref 3.5–5.1)
SODIUM SERPL-SCNC: 135 MMOL/L (ref 136–145)

## 2019-05-21 PROCEDURE — 99404 PR PREVENT COUNSEL,INDIV,60 MIN: ICD-10-PCS | Mod: S$GLB,,,

## 2019-05-21 PROCEDURE — 99404 PREV MED CNSL INDIV APPRX 60: CPT | Mod: S$GLB,,,

## 2019-05-21 PROCEDURE — 99999 PR PBB SHADOW E&M-EST. PATIENT-LVL II: ICD-10-PCS | Mod: PBBFAC,,,

## 2019-05-21 PROCEDURE — 80048 BASIC METABOLIC PNL TOTAL CA: CPT

## 2019-05-21 PROCEDURE — 99999 PR PBB SHADOW E&M-EST. PATIENT-LVL II: CPT | Mod: PBBFAC,,,

## 2019-05-21 PROCEDURE — 36415 COLL VENOUS BLD VENIPUNCTURE: CPT

## 2019-05-21 RX ORDER — VARENICLINE TARTRATE 0.5 (11)-1
KIT ORAL
Qty: 53 TABLET | Refills: 0 | Status: SHIPPED | OUTPATIENT
Start: 2019-05-21 | End: 2019-06-24 | Stop reason: SDUPTHER

## 2019-05-21 RX ORDER — IBUPROFEN 200 MG
1 TABLET ORAL DAILY
Qty: 14 PATCH | Refills: 0 | Status: SHIPPED | OUTPATIENT
Start: 2019-05-21 | End: 2019-06-24 | Stop reason: SDUPTHER

## 2019-05-21 NOTE — Clinical Note
Patient was seen for tobacco cessation intake.  Patient will begin on starter pack Chantix and 21 mg nicotine patch.  Patient reports use in the past without any negative side effects.  Patient has agreed to bi weekly follow up.

## 2019-05-22 ENCOUNTER — PATIENT MESSAGE (OUTPATIENT)
Dept: INTERNAL MEDICINE | Facility: CLINIC | Age: 69
End: 2019-05-22

## 2019-05-22 ENCOUNTER — TELEPHONE (OUTPATIENT)
Dept: INTERNAL MEDICINE | Facility: CLINIC | Age: 69
End: 2019-05-22

## 2019-05-22 DIAGNOSIS — E87.6 HYPOKALEMIA: Primary | ICD-10-CM

## 2019-05-22 RX ORDER — POTASSIUM CHLORIDE 750 MG/1
10 TABLET, EXTENDED RELEASE ORAL DAILY
Qty: 90 TABLET | Refills: 3 | Status: SHIPPED | OUTPATIENT
Start: 2019-05-22 | End: 2020-12-03 | Stop reason: SDUPTHER

## 2019-05-22 NOTE — TELEPHONE ENCOUNTER
Please notify pt that her potassium is low   rec start potassium rx to take daily - new rx sent in   Please arrange bmp in 1 week

## 2019-05-22 NOTE — TELEPHONE ENCOUNTER
Ms Varma this is Candice. This is Dr. Sahni's recommendation:  Please notify pt that her potassium is low   rec start potassium rx to take daily - new rx sent in   Please arrange bmp in 1 week I need a day and time to schedule this lab work

## 2019-06-03 ENCOUNTER — PATIENT OUTREACH (OUTPATIENT)
Dept: OTHER | Facility: OTHER | Age: 69
End: 2019-06-03

## 2019-06-03 NOTE — PROGRESS NOTES
Last 5 Patient Entered Readings                                      Current 30 Day Average: 141/89     Recent Readings 5/23/2019 5/23/2019 5/2/2019 5/2/2019 5/2/2019    SBP (mmHg) 141 169 155 149 151    DBP (mmHg) 78 100 91 111 105    Pulse 106 107 100 98 101        Left voicemail for follow up, requested more readings

## 2019-06-04 ENCOUNTER — LAB VISIT (OUTPATIENT)
Dept: LAB | Facility: OTHER | Age: 69
End: 2019-06-04
Payer: MEDICARE

## 2019-06-04 DIAGNOSIS — R60.9 EDEMA, UNSPECIFIED TYPE: ICD-10-CM

## 2019-06-04 DIAGNOSIS — I10 HYPERTENSION, BENIGN: ICD-10-CM

## 2019-06-04 DIAGNOSIS — I50.30 HEART FAILURE WITH PRESERVED EJECTION FRACTION: ICD-10-CM

## 2019-06-04 LAB
ANION GAP SERPL CALC-SCNC: 16 MMOL/L (ref 8–16)
BUN SERPL-MCNC: 13 MG/DL (ref 8–23)
CALCIUM SERPL-MCNC: 8.2 MG/DL (ref 8.7–10.5)
CHLORIDE SERPL-SCNC: 97 MMOL/L (ref 95–110)
CO2 SERPL-SCNC: 18 MMOL/L (ref 23–29)
CREAT SERPL-MCNC: 1 MG/DL (ref 0.5–1.4)
EST. GFR  (AFRICAN AMERICAN): >60 ML/MIN/1.73 M^2
EST. GFR  (NON AFRICAN AMERICAN): 58 ML/MIN/1.73 M^2
GLUCOSE SERPL-MCNC: 107 MG/DL (ref 70–110)
POTASSIUM SERPL-SCNC: 3.8 MMOL/L (ref 3.5–5.1)
SODIUM SERPL-SCNC: 131 MMOL/L (ref 136–145)

## 2019-06-04 PROCEDURE — 80048 BASIC METABOLIC PNL TOTAL CA: CPT | Mod: 91

## 2019-06-04 PROCEDURE — 36415 COLL VENOUS BLD VENIPUNCTURE: CPT

## 2019-06-06 ENCOUNTER — PATIENT MESSAGE (OUTPATIENT)
Dept: INTERNAL MEDICINE | Facility: CLINIC | Age: 69
End: 2019-06-06

## 2019-06-06 ENCOUNTER — TELEPHONE (OUTPATIENT)
Dept: INTERNAL MEDICINE | Facility: CLINIC | Age: 69
End: 2019-06-06

## 2019-06-06 DIAGNOSIS — I10 HYPERTENSION, BENIGN: Primary | ICD-10-CM

## 2019-06-06 NOTE — TELEPHONE ENCOUNTER
Message sent to pt via my chart with lab results and updates to plan.     Please arrange bmp either monico or next week - nonfasting

## 2019-06-06 NOTE — TELEPHONE ENCOUNTER
Ms Avani Sahni wants you to have  bmp  next week - nonfasting . Let me know what day next week is convenient to get this lab test done

## 2019-06-13 ENCOUNTER — PATIENT MESSAGE (OUTPATIENT)
Dept: INTERNAL MEDICINE | Facility: CLINIC | Age: 69
End: 2019-06-13

## 2019-06-17 NOTE — PROGRESS NOTES
Last 5 Patient Entered Readings                                      Current 30 Day Average: 135/84     Recent Readings 6/12/2019 6/12/2019 6/6/2019 6/5/2019 5/23/2019    SBP (mmHg) 113 121 150 137 141    DBP (mmHg) 69 94 80 81 78    Pulse 104 104 109 100 106        Digital Medicine: Health  Follow Up    Patient believes states that she is on medication to quit smoking. She reports that she has been cutting back and is now smoking about a pack and a half per day (previously at 2 packs per day). When suggested setting a goal to reduce down to 1 pack per day, patient strongly declines. Will continue to follow up on smoking reduction, but will avoid setting specific goals. Congratulated patient on her progress. She further reports that her fluid retention has been down recently.     Lifestyle Modifications:    1.Dietary Modifications (Sodium intake <2,000mg/day, food labels, dining out):    Continues to follow a low sodium diet and cooking at home. Patient states that she can't recall the last time she has eaten out.     2.Physical Activity:    Patient unable to lift her legs or left arm, but reports that she continues to work on her chair exercises as much as she can.     3.Medication Therapy: Patient has been compliant with the medication regimen.    4.Patient has the following medication side effects/concerns:   (Frequency/Alleviating factors/Precipitating factors, etc.)     Follow up with Ms. Naliniany Varma completed. No further questions or concerns. Will continue to follow up to achieve health goals.

## 2019-06-22 ENCOUNTER — PATIENT MESSAGE (OUTPATIENT)
Dept: INTERNAL MEDICINE | Facility: CLINIC | Age: 69
End: 2019-06-22

## 2019-06-22 DIAGNOSIS — S32.030D COMPRESSION FRACTURE OF L3 LUMBAR VERTEBRA WITH ROUTINE HEALING: ICD-10-CM

## 2019-06-24 ENCOUNTER — LAB VISIT (OUTPATIENT)
Dept: LAB | Facility: OTHER | Age: 69
End: 2019-06-24
Attending: INTERNAL MEDICINE
Payer: MEDICARE

## 2019-06-24 ENCOUNTER — TELEPHONE (OUTPATIENT)
Dept: INTERNAL MEDICINE | Facility: CLINIC | Age: 69
End: 2019-06-24

## 2019-06-24 ENCOUNTER — CLINICAL SUPPORT (OUTPATIENT)
Dept: SMOKING CESSATION | Facility: CLINIC | Age: 69
End: 2019-06-24
Payer: COMMERCIAL

## 2019-06-24 DIAGNOSIS — F17.210 HEAVY CIGARETTE SMOKER (20-39 PER DAY): Primary | ICD-10-CM

## 2019-06-24 DIAGNOSIS — E87.20 METABOLIC ACIDOSIS: Primary | ICD-10-CM

## 2019-06-24 DIAGNOSIS — I10 HYPERTENSION, BENIGN: ICD-10-CM

## 2019-06-24 LAB
ANION GAP SERPL CALC-SCNC: 17 MMOL/L (ref 8–16)
BUN SERPL-MCNC: 9 MG/DL (ref 8–23)
CALCIUM SERPL-MCNC: 8.6 MG/DL (ref 8.7–10.5)
CHLORIDE SERPL-SCNC: 93 MMOL/L (ref 95–110)
CO2 SERPL-SCNC: 18 MMOL/L (ref 23–29)
CREAT SERPL-MCNC: 1 MG/DL (ref 0.5–1.4)
EST. GFR  (AFRICAN AMERICAN): >60 ML/MIN/1.73 M^2
EST. GFR  (NON AFRICAN AMERICAN): 58 ML/MIN/1.73 M^2
GLUCOSE SERPL-MCNC: 84 MG/DL (ref 70–110)
POTASSIUM SERPL-SCNC: 3.8 MMOL/L (ref 3.5–5.1)
SODIUM SERPL-SCNC: 128 MMOL/L (ref 136–145)

## 2019-06-24 PROCEDURE — 99999 PR PBB SHADOW E&M-EST. PATIENT-LVL II: CPT | Mod: PBBFAC,,,

## 2019-06-24 PROCEDURE — 99404 PR PREVENT COUNSEL,INDIV,60 MIN: ICD-10-PCS | Mod: S$GLB,,,

## 2019-06-24 PROCEDURE — 99999 PR PBB SHADOW E&M-EST. PATIENT-LVL II: ICD-10-PCS | Mod: PBBFAC,,,

## 2019-06-24 PROCEDURE — 80048 BASIC METABOLIC PNL TOTAL CA: CPT

## 2019-06-24 PROCEDURE — 36415 COLL VENOUS BLD VENIPUNCTURE: CPT

## 2019-06-24 PROCEDURE — 99404 PREV MED CNSL INDIV APPRX 60: CPT | Mod: S$GLB,,,

## 2019-06-24 RX ORDER — VARENICLINE TARTRATE 1 MG/1
1 TABLET, FILM COATED ORAL DAILY
Qty: 56 TABLET | Refills: 0 | Status: SHIPPED | OUTPATIENT
Start: 2019-06-24 | End: 2019-07-31 | Stop reason: SDUPTHER

## 2019-06-24 RX ORDER — IBUPROFEN 200 MG
1 TABLET ORAL DAILY
Qty: 14 PATCH | Refills: 0 | Status: SHIPPED | OUTPATIENT
Start: 2019-06-24 | End: 2019-07-08 | Stop reason: SDUPTHER

## 2019-06-24 RX ORDER — HYDROCODONE BITARTRATE AND ACETAMINOPHEN 10; 325 MG/1; MG/1
1 TABLET ORAL EVERY 12 HOURS PRN
Qty: 60 TABLET | Refills: 0 | Status: SHIPPED | OUTPATIENT
Start: 2019-06-24 | End: 2019-08-01 | Stop reason: SDUPTHER

## 2019-06-24 NOTE — PROGRESS NOTES
Individual Follow-Up Form    6/24/2019    Quit Date:     Clinical Status of Patient: Outpatient    Length of Service: 60 minutes    Continuing Medication: yes  Chantix    Other Medications: nicotine patch     Target Symptoms: Withdrawal and medication side effects. The following were  rated moderate (3) to severe (4) on TCRS:  · Moderate (3): none  · Severe (4): none    Comments: Patient has decreased tobacco use to 20-25  cpd since starting on Chantix and 21 nicotine patch without any negative side effects at this time.  We discussed tobacco elimination strategies.  We discussed moving tobacco to her living room and smoking there. We discussed cues, triggers, behavioral chain, rate fading, reasons/willpower and timeline to quitting. The patient denies any abnormal behavioral or mental changes at this time. The patient will continue with group therapy sessions and medication monitoring by CTTS. Prescribed medication management will be by physician.       Diagnosis: F17.210    Next Visit: 1 week

## 2019-06-24 NOTE — Clinical Note
Patient has decreased tobacco use to 20-25  cpd since starting on Chantix and 21 nicotine patch without any negative side effects at this time.  We discussed tobacco elimination strategies.  We discussed moving tobacco to her living room and smoking there. We discussed cues, triggers, behavioral chain, rate fading, reasons/willpower and timeline to quitting. The patient denies any abnormal behavioral or mental changes at this time. The patient will continue with group therapy sessions and medication monitoring by CTTS. Prescribed medication management will be by physician

## 2019-06-24 NOTE — TELEPHONE ENCOUNTER
----- Message from Mireille Parkinson MA sent at 6/24/2019  3:11 PM CDT -----  Contact: [facility]  Please advise    ----- Message -----  From: Kiersten Stephens  Sent: 6/24/2019   2:47 PM  To: Suad Preston Staff    Name of Who is Calling: Nayeli      What is the request in detail: Is the Chantix to be taken 1 tablet daily or should it be 1 tablet twice daily. Pharmacy states that its normally twice, please call to advise      Can the clinic reply by [facility]: no      What Number to Call Back if not in [facility]: [phone]

## 2019-06-25 NOTE — TELEPHONE ENCOUNTER
Called pt and reviewed BMP - metabolic acidosis   - she reports no changes in respiratory status - no inc work of breathing - compliant with home oxygen - working to reduce tobacco   - taking 40mg of torsemide daily   - reports skipped meals prior labs and over the past few weeks   - drank 2 etoh drinks the evening prior to labs   - no diarrhea     - no fevers, skin changes, abd pain, cp, changes in resp status   - feeling 'great' overall   - still has not received call from renal for appt - she agrees to call back monico to f/u on this     - will reduce back to 20mg and will take 40mg prn if gains 2-3 pounds over 2-3 days prn   - do not skip meals prior to labs - ok to eat breakfast as not fasting lab  - no etoh night prior to labs     Er and rtc prompts given   All questions were answered and pt verbalized understanding of plan.     Please arrange BMP for Monday as pt unable to come Friday for lab

## 2019-06-28 NOTE — TELEPHONE ENCOUNTER
Pt's labs,Nephorology, and tyroid us/renal us, CT has been scheduled as requested by pcp. Pt has no further questions at this time.   66yo man PMH CAD s/p stent, afib on eliquis s/p PPM ICD placement, HTN, HLD, DM presents with intermittent chest pain and SOB with exertion x 1 month. Concern for ACS and will check cxr, trop, cbc, cmp. Will likely need admission for further cardiac work up.

## 2019-07-01 ENCOUNTER — LAB VISIT (OUTPATIENT)
Dept: LAB | Facility: OTHER | Age: 69
End: 2019-07-01
Attending: INTERNAL MEDICINE
Payer: MEDICARE

## 2019-07-01 DIAGNOSIS — E87.20 METABOLIC ACIDOSIS: ICD-10-CM

## 2019-07-01 LAB
ANION GAP SERPL CALC-SCNC: 15 MMOL/L (ref 8–16)
BUN SERPL-MCNC: 19 MG/DL (ref 8–23)
CALCIUM SERPL-MCNC: 10.6 MG/DL (ref 8.7–10.5)
CHLORIDE SERPL-SCNC: 90 MMOL/L (ref 95–110)
CO2 SERPL-SCNC: 23 MMOL/L (ref 23–29)
CREAT SERPL-MCNC: 1.5 MG/DL (ref 0.5–1.4)
EST. GFR  (AFRICAN AMERICAN): 41 ML/MIN/1.73 M^2
EST. GFR  (NON AFRICAN AMERICAN): 35 ML/MIN/1.73 M^2
GLUCOSE SERPL-MCNC: 86 MG/DL (ref 70–110)
POTASSIUM SERPL-SCNC: 4.3 MMOL/L (ref 3.5–5.1)
SODIUM SERPL-SCNC: 128 MMOL/L (ref 136–145)

## 2019-07-01 PROCEDURE — 80048 BASIC METABOLIC PNL TOTAL CA: CPT

## 2019-07-01 PROCEDURE — 36415 COLL VENOUS BLD VENIPUNCTURE: CPT

## 2019-07-04 ENCOUNTER — TELEPHONE (OUTPATIENT)
Dept: INTERNAL MEDICINE | Facility: CLINIC | Age: 69
End: 2019-07-04

## 2019-07-04 ENCOUNTER — PATIENT MESSAGE (OUTPATIENT)
Dept: INTERNAL MEDICINE | Facility: CLINIC | Age: 69
End: 2019-07-04

## 2019-07-04 DIAGNOSIS — N17.9 AKI (ACUTE KIDNEY INJURY): Primary | ICD-10-CM

## 2019-07-04 NOTE — TELEPHONE ENCOUNTER
Message sent to pt via my chart with lab results and updates to plan.   Please arrange BMP in 1 week

## 2019-07-05 ENCOUNTER — PATIENT MESSAGE (OUTPATIENT)
Dept: INTERNAL MEDICINE | Facility: CLINIC | Age: 69
End: 2019-07-05

## 2019-07-08 ENCOUNTER — LAB VISIT (OUTPATIENT)
Dept: LAB | Facility: OTHER | Age: 69
End: 2019-07-08
Attending: INTERNAL MEDICINE
Payer: MEDICARE

## 2019-07-08 ENCOUNTER — CLINICAL SUPPORT (OUTPATIENT)
Dept: SMOKING CESSATION | Facility: CLINIC | Age: 69
End: 2019-07-08
Payer: COMMERCIAL

## 2019-07-08 DIAGNOSIS — F17.210 HEAVY CIGARETTE SMOKER (20-39 PER DAY): ICD-10-CM

## 2019-07-08 DIAGNOSIS — N17.9 AKI (ACUTE KIDNEY INJURY): ICD-10-CM

## 2019-07-08 LAB
ANION GAP SERPL CALC-SCNC: 11 MMOL/L (ref 8–16)
BUN SERPL-MCNC: 17 MG/DL (ref 8–23)
CALCIUM SERPL-MCNC: 9.7 MG/DL (ref 8.7–10.5)
CHLORIDE SERPL-SCNC: 93 MMOL/L (ref 95–110)
CO2 SERPL-SCNC: 26 MMOL/L (ref 23–29)
CREAT SERPL-MCNC: 1.2 MG/DL (ref 0.5–1.4)
EST. GFR  (AFRICAN AMERICAN): 53 ML/MIN/1.73 M^2
EST. GFR  (NON AFRICAN AMERICAN): 46 ML/MIN/1.73 M^2
GLUCOSE SERPL-MCNC: 91 MG/DL (ref 70–110)
POTASSIUM SERPL-SCNC: 4.6 MMOL/L (ref 3.5–5.1)
SODIUM SERPL-SCNC: 130 MMOL/L (ref 136–145)

## 2019-07-08 PROCEDURE — 99403 PREV MED CNSL INDIV APPRX 45: CPT | Mod: S$GLB,,,

## 2019-07-08 PROCEDURE — 99403 PR PREVENT COUNSEL,INDIV,45 MIN: ICD-10-PCS | Mod: S$GLB,,,

## 2019-07-08 PROCEDURE — 99999 PR PBB SHADOW E&M-EST. PATIENT-LVL II: ICD-10-PCS | Mod: PBBFAC,,,

## 2019-07-08 PROCEDURE — 99999 PR PBB SHADOW E&M-EST. PATIENT-LVL II: CPT | Mod: PBBFAC,,,

## 2019-07-08 PROCEDURE — 80048 BASIC METABOLIC PNL TOTAL CA: CPT

## 2019-07-08 PROCEDURE — 36415 COLL VENOUS BLD VENIPUNCTURE: CPT

## 2019-07-08 RX ORDER — IBUPROFEN 200 MG
1 TABLET ORAL DAILY
Qty: 14 PATCH | Refills: 0 | Status: SHIPPED | OUTPATIENT
Start: 2019-07-08 | End: 2019-07-16 | Stop reason: SDUPTHER

## 2019-07-08 NOTE — PROGRESS NOTES
Individual Follow-Up Form    7/8/2019    Quit Date:    Clinical Status of Patient: Outpatient    Length of Service: 60 minutes    Continuing Medication: yes  Chantix    Other Medications: Nicotine patch     Target Symptoms: Withdrawal and medication side effects. The following were  rated moderate (3) to severe (4) on TCRS:  · Moderate (3): none  · Severe (4): none    Comments: Patient is smoking 1ppd down from 2 ppd.  Patient continue to use 21 mg nicotine patch and 1 mg Chantix BID without any negative side effects at this time.  Patient has been unsuccessful with moving where she keeps tobacco and where she smokes. Patient noticed since cutting back on tobacco use her blood pressure has improved.  The patient denies any abnormal behavioral or mental changes at this time. The patient will continue with group therapy sessions and medication monitoring by CTTS. Prescribed medication management will be by physician. completion of TCRS (Tobacco Cessation Rating Scale) reviewed strategies, cues, and triggers. Introduced the negative impact of tobacco on health, the health advantages of discontinuing the use of tobacco, time line improved health changes after a quit, withdrawal issues to expect from nicotine and habit, and ways to achieve the goal of a quit.        Diagnosis: F17.210    Next Visit: 1 week

## 2019-07-08 NOTE — Clinical Note
Patient is smoking 1ppd down from 2 ppd.  Patient continue to use 21 mg nicotine patch and 1 mg Chantix BID without any negative side effects at this time.  Patient has been unsuccessful with moving where she keeps tobacco and where she smokes. Patient noticed since cutting back on tobacco use her blood pressure has improved.  The patient denies any abnormal behavioral or mental changes at this time. The patient will continue with group therapy sessions and medication monitoring by CTTS.

## 2019-07-16 RX ORDER — IBUPROFEN 200 MG
1 TABLET ORAL DAILY
Qty: 14 PATCH | Refills: 0 | Status: SHIPPED | OUTPATIENT
Start: 2019-07-16 | End: 2019-07-31 | Stop reason: SDUPTHER

## 2019-07-19 ENCOUNTER — PATIENT MESSAGE (OUTPATIENT)
Dept: ADMINISTRATIVE | Facility: OTHER | Age: 69
End: 2019-07-19

## 2019-07-25 ENCOUNTER — CLINICAL SUPPORT (OUTPATIENT)
Dept: SMOKING CESSATION | Facility: CLINIC | Age: 69
End: 2019-07-25
Payer: COMMERCIAL

## 2019-07-25 DIAGNOSIS — F17.210 MODERATE CIGARETTE SMOKER (10-19 PER DAY): Primary | ICD-10-CM

## 2019-07-25 PROCEDURE — 99406 PR TOBACCO USE CESSATION INTERMEDIATE 3-10 MINUTES: ICD-10-PCS | Mod: S$GLB,,,

## 2019-07-25 PROCEDURE — 99406 BEHAV CHNG SMOKING 3-10 MIN: CPT | Mod: S$GLB,,,

## 2019-07-25 PROCEDURE — 99999 PR PBB SHADOW E&M-EST. PATIENT-LVL I: ICD-10-PCS | Mod: PBBFAC,,,

## 2019-07-25 PROCEDURE — 99999 PR PBB SHADOW E&M-EST. PATIENT-LVL I: CPT | Mod: PBBFAC,,,

## 2019-07-29 ENCOUNTER — PATIENT OUTREACH (OUTPATIENT)
Dept: OTHER | Facility: OTHER | Age: 69
End: 2019-07-29

## 2019-07-29 NOTE — PROGRESS NOTES
Last 5 Patient Entered Readings                                      Current 30 Day Average: 130/75     Recent Readings 7/23/2019 7/11/2019 7/4/2019 7/3/2019 6/25/2019    SBP (mmHg) 147 144 108 121 130    DBP (mmHg) 81 76 67 77 87    Pulse 106 92 87 101 101        Left voicemail for follow up

## 2019-07-30 ENCOUNTER — PATIENT MESSAGE (OUTPATIENT)
Dept: INTERNAL MEDICINE | Facility: CLINIC | Age: 69
End: 2019-07-30

## 2019-07-30 DIAGNOSIS — S32.030D COMPRESSION FRACTURE OF L3 LUMBAR VERTEBRA WITH ROUTINE HEALING: ICD-10-CM

## 2019-07-30 RX ORDER — HYDROCODONE BITARTRATE AND ACETAMINOPHEN 10; 325 MG/1; MG/1
1 TABLET ORAL EVERY 12 HOURS PRN
Qty: 60 TABLET | Refills: 0 | Status: CANCELLED | OUTPATIENT
Start: 2019-07-30

## 2019-07-31 ENCOUNTER — CLINICAL SUPPORT (OUTPATIENT)
Dept: SMOKING CESSATION | Facility: CLINIC | Age: 69
End: 2019-07-31
Payer: COMMERCIAL

## 2019-07-31 DIAGNOSIS — F17.210 HEAVY CIGARETTE SMOKER (20-39 PER DAY): ICD-10-CM

## 2019-07-31 PROCEDURE — 99999 PR PBB SHADOW E&M-EST. PATIENT-LVL II: ICD-10-PCS | Mod: PBBFAC,,,

## 2019-07-31 PROCEDURE — 99403 PREV MED CNSL INDIV APPRX 45: CPT | Mod: S$GLB,,,

## 2019-07-31 PROCEDURE — 99403 PR PREVENT COUNSEL,INDIV,45 MIN: ICD-10-PCS | Mod: S$GLB,,,

## 2019-07-31 PROCEDURE — 99999 PR PBB SHADOW E&M-EST. PATIENT-LVL II: CPT | Mod: PBBFAC,,,

## 2019-07-31 RX ORDER — VARENICLINE TARTRATE 1 MG/1
1 TABLET, FILM COATED ORAL DAILY
Qty: 56 TABLET | Refills: 0 | Status: SHIPPED | OUTPATIENT
Start: 2019-07-31 | End: 2019-08-08 | Stop reason: SDUPTHER

## 2019-07-31 RX ORDER — IBUPROFEN 200 MG
1 TABLET ORAL DAILY
Qty: 14 PATCH | Refills: 0 | Status: SHIPPED | OUTPATIENT
Start: 2019-07-31 | End: 2019-09-03 | Stop reason: SDUPTHER

## 2019-07-31 NOTE — PROGRESS NOTES
Individual Follow-Up Form    7/31/2019    Quit Date:     Clinical Status of Patient: Outpatient    Length of Service: 60 minutes    Continuing Medication: yes  Patches    Other Medications: none     Target Symptoms: Withdrawal and medication side effects. The following were  rated moderate (3) to severe (4) on TCRS:  · Moderate (3): none  · Severe (4): none    Comments: Patient is smoking 16-17 cpd.   Patient will rate fade to 12 cpd over the next 2 weeks.  She reports going 2 days without any tobacco use.  She reports poor sleep last week which caused increase in use. Patient is smoking in every room of her house. Patient was encouraged to smoke in her living room only and leave tobacco in that area as well.    Patient is leaving cigarettes in her bedroom or Kitchen. completion of TCRS (Tobacco Cessation Rating Scale) reviewed strategies, controlling environment, cues, triggers, new goals set. Introduced high risk situations with preparation interventions, caffeine similarities with withdrawal issues of habit and nicotine, Alcohol, Understanding urges, cravings, stress and relaxation. Open discussion with intervention discussion.           Diagnosis: F17.210    Next Visit: 1 week

## 2019-08-01 ENCOUNTER — PATIENT MESSAGE (OUTPATIENT)
Dept: INTERNAL MEDICINE | Facility: CLINIC | Age: 69
End: 2019-08-01

## 2019-08-01 DIAGNOSIS — S32.030D COMPRESSION FRACTURE OF L3 LUMBAR VERTEBRA WITH ROUTINE HEALING: ICD-10-CM

## 2019-08-01 RX ORDER — HYDROCODONE BITARTRATE AND ACETAMINOPHEN 10; 325 MG/1; MG/1
1 TABLET ORAL EVERY 12 HOURS PRN
Qty: 60 TABLET | Refills: 0 | Status: SHIPPED | OUTPATIENT
Start: 2019-08-01 | End: 2019-09-06 | Stop reason: SDUPTHER

## 2019-08-08 ENCOUNTER — TELEPHONE (OUTPATIENT)
Dept: SMOKING CESSATION | Facility: CLINIC | Age: 69
End: 2019-08-08

## 2019-08-08 DIAGNOSIS — F17.210 HEAVY CIGARETTE SMOKER (20-39 PER DAY): ICD-10-CM

## 2019-08-08 RX ORDER — VARENICLINE TARTRATE 1 MG/1
1 TABLET, FILM COATED ORAL 2 TIMES DAILY
Qty: 60 TABLET | Refills: 0 | Status: SHIPPED | OUTPATIENT
Start: 2019-08-08 | End: 2019-10-03

## 2019-08-14 NOTE — PROGRESS NOTES
"NEPHROLOGY PROGRESS NOTE    Subjective:       Patient ID: Nalini Varma is a 68 y.o. White female who presents for follow up evaluation of hyponatremia. .     From Dr. Gee:  "The patient has a history HTN x 2-3 yrs. She was recently admitted to [facility] with dizziness and found to have acute on chronic to hyponatremia. Her hyponatremia is evident since 12/16  But also had borderline low sodium since 2/16 (135 meq/l). In December of 2016 she was on HCTZ and Zoloft but she is not taking it anymore. Reports adhering to fluid restriction around 1 liter at this time. Last serum Na down was 130 at discharge from [facility]. Unfortunately the patient is homeless and lives in a shelter at this time (20 month).  The patient does not frequently use NSAIDS or herbal supplements. Livelong smoker (40 yrs)."    HPI:   A 69 y/o female with HTN, COPD oxygen dependent(2lt), HFpEF, and Chronic smoker >50 yrs, arrives for follow up, reports MEDELLIN, uses a walker .Drinking ~1 Lt water dialy. Still reporting lower extrenty edema. Hospitalized on December 2018 at [facility] due to hyponatremia. The patient denies , chest pain, palpitations, dysuria, problems voiding, N/V/D, taking NSAIDs or new antibiotics, recreational drugs, recent episode of dehydration, acute illness, hospitalization or exposure to IV radiocontrast.    Review of Systems   Constitutional: Negative for activity change, appetite change, chills, fatigue, fever and unexpected weight change.   HENT: Negative.  Negative for nosebleeds.    Eyes: Negative.    Respiratory: Positive for shortness of breath (stable with walking). Negative for cough and chest tightness.    Cardiovascular: Negative.  Negative for chest pain and leg swelling.   Gastrointestinal: Negative for abdominal pain, anal bleeding, diarrhea, nausea and vomiting.   Genitourinary: Negative for decreased urine volume, difficulty urinating, dysuria, flank pain, frequency, hematuria, pelvic pain, urgency " and vaginal bleeding.   Musculoskeletal: Negative.  Negative for joint swelling and myalgias.   Skin: Negative.  Negative for rash.   Neurological: Negative.    Psychiatric/Behavioral: Negative.    All other systems reviewed and are negative.          Past Medical History:   Diagnosis Date    Allergy     Anemia due to gastrointestinal blood loss     LESLY from gastric ulcer due to chronic nsaid use    Anxiety     Arthritis     CKD (chronic kidney disease) stage 3, GFR 30-59 ml/min     followed by [facility] CRI study - recieved labs from 2017 and 2/2018    Gastric ulcer     H/O knee surgery 2001    right    Heart failure with preserved ejection fraction 8/20/2018    Hx of psychiatric care     Hyperlipidemia     Hypertension     Hyponatremia     Psychiatric problem     Therapy     Tobacco abuse        Family History   Problem Relation Age of Onset    Hypertension Mother     Alzheimer's disease Mother     Dementia Mother     Depression Mother     Myasthenia gravis Father     Arthritis Father     Rectal cancer Father     Hypertension Brother     Arthritis Brother     Colon cancer Brother     No Known Problems Son     Cancer Sister         brain    Melanoma Neg Hx     Breast cancer Neg Hx        Past Surgical History:   Procedure Laterality Date    ANGIOGRAM, CORONARY ARTERY N/A 7/20/2018    Performed by Willard Roberts MD at [facility] CATH LAB    BREAST CYST EXCISION Right     1967    cataract surgery      COLONOSCOPY  2015    ESOPHAGOGASTRODUODENOSCOPY  2015    FRACTURE SURGERY      left femur    JOINT REPLACEMENT  2007    left knee x 2, 2nd performed 2/2 to MRSA infection 3 months after 1st surgery    ORIF FEMUR FRACTURE Left     TUBAL LIGATION           Current Outpatient Medications:     albuterol (VENTOLIN HFA) 90 mcg/actuation inhaler, inhale 1-2 puffs as needed every 6 hours for wheezing and shortness of breath, Disp: 18 g, Rfl: 11    amLODIPine (NORVASC) 10 MG tablet, Take 1  tablet (10 mg total) by mouth once daily., Disp: 90 tablet, Rfl: 3    aspirin (ECOTRIN) 81 MG EC tablet, Take 81 mg by mouth once daily., Disp: , Rfl:     atorvastatin (LIPITOR) 40 MG tablet, TAKE 1 TABLET BY MOUTH EVERY DAILY, Disp: 90 tablet, Rfl: 3    biotin 1 mg Cap, Take by mouth., Disp: , Rfl:     cyanocobalamin 1,000 mcg TbSR, Take 1,000 mcg by mouth once daily. (Patient taking differently: Take 1,000 mcg by mouth daily as needed. ), Disp: 90 tablet, Rfl: 1    denosumab (PROLIA) 60 mg/mL Syrg, Inject 1 mL (60 mg total) into the skin every 6 (six) months., Disp: 2 mL, Rfl: 0    famotidine (PEPCID) 20 MG tablet, TAKE 1 TABLET BY MOUTH TWO TIMES A DAY, Disp: 180 tablet, Rfl: 3    fluticasone (FLONASE) 50 mcg/actuation nasal spray, 1 spray by Each Nare route 2 (two) times daily as needed for Rhinitis., Disp: , Rfl:     fluticasone propion-salmeterol (ADVAIR HFA) 115-21 mcg/actuation HFAA, Inhale 2 puffs into the lungs every 12 (twelve) hours., Disp: 36 g, Rfl: 3    guaiFENesin (MUCINEX) 600 mg 12 hr tablet, Take 1,200 mg by mouth 2 (two) times daily as needed for Congestion., Disp: , Rfl:     HYDROcodone-acetaminophen (NORCO)  mg per tablet, Take 1 tablet by mouth every 12 (twelve) hours as needed for Pain., Disp: 60 tablet, Rfl: 0    loratadine (CLARITIN) 10 mg tablet, Take 10 mg by mouth once daily., Disp: , Rfl:     losartan (COZAAR) 50 MG tablet, Take 1 tablet (50 mg total) by mouth once daily., Disp: 90 tablet, Rfl: 0    MAGNESIUM ASCORBATE, BULK, MISC, , Disp: , Rfl:     magnesium oxide 400 mg Cap, Take 1 capsule by mouth every 12 (twelve) hours., Disp: , Rfl:     nicotine (NICODERM CQ) 21 mg/24 hr, Place 1 patch onto the skin once daily., Disp: 14 patch, Rfl: 0    potassium chloride SA (K-DUR,KLOR-CON) 10 MEQ tablet, Take 1 tablet (10 mEq total) by mouth once daily., Disp: 90 tablet, Rfl: 3    torsemide (DEMADEX) 20 MG Tab, Take 2 tablets (40 mg total) by mouth once daily., Disp: 180  tablet, Rfl: 3    umeclidinium (INCRUSE ELLIPTA) 62.5 mcg/actuation DsDv, Inhale 1 puff into the lungs once daily. Controller, Disp: 90 each, Rfl: 3    varenicline (CHANTIX) 1 mg Tab, Take 1 tablet (1 mg total) by mouth 2 (two) times daily., Disp: 60 tablet, Rfl: 0    vitamin D 1000 units Tab, Take 1,000 Units by mouth once daily., Disp: , Rfl:     walker (ULTRA-LIGHT ROLLATOR) Misc, 1 each by Misc.(Non-Drug; Combo Route) route once daily., Disp: 1 each, Rfl: 0        Objective:     Wt Readings from Last 3 Encounters:   08/15/19 103 kg (227 lb 1.2 oz)   05/15/19 106.5 kg (234 lb 12.6 oz)   04/15/19 106.1 kg (233 lb 14.5 oz)     Temp Readings from Last 3 Encounters:   03/09/19 98.3 °F (36.8 °C) (Oral)   12/15/18 96 °F (35.6 °C) (Oral)   07/21/18 96.5 °F (35.8 °C) (Oral)     BP Readings from Last 3 Encounters:   08/15/19 (!) 170/70   05/15/19 (!) 157/78   04/15/19 124/62     Pulse Readings from Last 3 Encounters:   08/15/19 107   05/15/19 (!) 125   04/15/19 100        Physical Exam    Constitutional:  well-developed and well-nourished. No distress.   HENT:   Head: Normocephalic and atraumatic.   Neck: Normal range of motion. Neck supple.   Cardiovascular: Normal rate, regular rhythm. Systolic ejection murmur 3/6     Pulmonary/Chest: Effort normal. Mild expiratory wheezes.   Abdominal: Soft. Bowel sounds are normal, no distension. There is no tenderness. There is no rebound and no guarding.   Musculoskeletal: Normal range of motion. +2 LE edema   Neurological: Awake alert and oriented x 4. Motor strength preserved 5/5. DTR symmetrical.  Skin: Skin is warm and dry. No rash noted. She is not diaphoretic. No erythema. No pallor.       Assessment:       Will reevaluate urine electrolytes to evalaute the possibilby of SIADH. Heart failure with preserved ejection fraction could also be the culprit of hyponatremia.     ICD-10-CM ICD-9-CM    1. Hyponatremia E87.1 276.1 Sodium, urine, random      Chloride, urine, random       Osmolality, urine      Potassium, urine, random      Comprehensive metabolic panel   2. Heart failure with preserved ejection fraction I50.30 428.9 Sodium, urine, random      Chloride, urine, random      Osmolality, urine      Potassium, urine, random      Comprehensive metabolic panel        Plan:   1. CKD stage G3A    Baseline Creatine:  Lab Results   Component Value Date    CREATININE 1.2 07/08/2019         2. HTN: On Loop diuretic and Losartan. Reports at home BP measurement at ~140-150 SBP. Will follow and make adjustments as necessary. Oriented patient about proper diet to follow based on her Heart Failure and Hyponatremia.       3. DM:  Last HbA1C   Lab Results   Component Value Date    HGBA1C 5.3 12/11/2018        4. CKD-MBD:    Lab Results   Component Value Date    CALCIUM 9.7 07/08/2019    PHOS 2.7 03/07/2019       5. Anemia:   Lab Results   Component Value Date    HGB 10.3 (L) 03/09/2019     Lab Results   Component Value Date    IRON 33 07/15/2018    TIBC 324 07/15/2018    FERRITIN 94 07/15/2018       6. Lipid management:   Lab Results   Component Value Date    LDLCALC 49.2 (L) 04/24/2018             Follow up in with labs and Urine    Andi Velasco MD  Nephrology Fellow  [facility]

## 2019-08-15 ENCOUNTER — OFFICE VISIT (OUTPATIENT)
Dept: NEPHROLOGY | Facility: CLINIC | Age: 69
End: 2019-08-15
Payer: MEDICARE

## 2019-08-15 VITALS
BODY MASS INDEX: 37.83 KG/M2 | OXYGEN SATURATION: 97 % | HEART RATE: 107 BPM | WEIGHT: 227.06 LBS | SYSTOLIC BLOOD PRESSURE: 170 MMHG | DIASTOLIC BLOOD PRESSURE: 70 MMHG | HEIGHT: 65 IN

## 2019-08-15 DIAGNOSIS — E87.1 HYPONATREMIA: Primary | ICD-10-CM

## 2019-08-15 DIAGNOSIS — I50.30 HEART FAILURE WITH PRESERVED EJECTION FRACTION: ICD-10-CM

## 2019-08-15 PROCEDURE — 99214 PR OFFICE/OUTPT VISIT, EST, LEVL IV, 30-39 MIN: ICD-10-PCS | Mod: GC,S$GLB,, | Performed by: STUDENT IN AN ORGANIZED HEALTH CARE EDUCATION/TRAINING PROGRAM

## 2019-08-15 PROCEDURE — 1101F PT FALLS ASSESS-DOCD LE1/YR: CPT | Mod: CPTII,GC,S$GLB, | Performed by: STUDENT IN AN ORGANIZED HEALTH CARE EDUCATION/TRAINING PROGRAM

## 2019-08-15 PROCEDURE — 3077F SYST BP >= 140 MM HG: CPT | Mod: CPTII,GC,S$GLB, | Performed by: STUDENT IN AN ORGANIZED HEALTH CARE EDUCATION/TRAINING PROGRAM

## 2019-08-15 PROCEDURE — 99214 OFFICE O/P EST MOD 30 MIN: CPT | Mod: GC,S$GLB,, | Performed by: STUDENT IN AN ORGANIZED HEALTH CARE EDUCATION/TRAINING PROGRAM

## 2019-08-15 PROCEDURE — 99499 UNLISTED E&M SERVICE: CPT | Mod: S$GLB,,, | Performed by: STUDENT IN AN ORGANIZED HEALTH CARE EDUCATION/TRAINING PROGRAM

## 2019-08-15 PROCEDURE — 3078F DIAST BP <80 MM HG: CPT | Mod: CPTII,GC,S$GLB, | Performed by: STUDENT IN AN ORGANIZED HEALTH CARE EDUCATION/TRAINING PROGRAM

## 2019-08-15 PROCEDURE — 99999 PR PBB SHADOW E&M-EST. PATIENT-LVL V: ICD-10-PCS | Mod: PBBFAC,GC,, | Performed by: STUDENT IN AN ORGANIZED HEALTH CARE EDUCATION/TRAINING PROGRAM

## 2019-08-15 PROCEDURE — 1101F PR PT FALLS ASSESS DOC 0-1 FALLS W/OUT INJ PAST YR: ICD-10-PCS | Mod: CPTII,GC,S$GLB, | Performed by: STUDENT IN AN ORGANIZED HEALTH CARE EDUCATION/TRAINING PROGRAM

## 2019-08-15 PROCEDURE — 3077F PR MOST RECENT SYSTOLIC BLOOD PRESSURE >= 140 MM HG: ICD-10-PCS | Mod: CPTII,GC,S$GLB, | Performed by: STUDENT IN AN ORGANIZED HEALTH CARE EDUCATION/TRAINING PROGRAM

## 2019-08-15 PROCEDURE — 99499 RISK ADDL DX/OHS AUDIT: ICD-10-PCS | Mod: S$GLB,,, | Performed by: STUDENT IN AN ORGANIZED HEALTH CARE EDUCATION/TRAINING PROGRAM

## 2019-08-15 PROCEDURE — 3078F PR MOST RECENT DIASTOLIC BLOOD PRESSURE < 80 MM HG: ICD-10-PCS | Mod: CPTII,GC,S$GLB, | Performed by: STUDENT IN AN ORGANIZED HEALTH CARE EDUCATION/TRAINING PROGRAM

## 2019-08-15 PROCEDURE — 99999 PR PBB SHADOW E&M-EST. PATIENT-LVL V: CPT | Mod: PBBFAC,GC,, | Performed by: STUDENT IN AN ORGANIZED HEALTH CARE EDUCATION/TRAINING PROGRAM

## 2019-08-15 NOTE — PATIENT INSTRUCTIONS
1. Do urine lab at [facility]  2. Will contact you with new medication order  3. Follow up in 1 month

## 2019-08-19 NOTE — PROGRESS NOTES
Last 5 Patient Entered Readings                                      Current 30 Day Average: 150/105     Recent Readings 8/14/2019 8/14/2019 8/14/2019 7/23/2019 7/11/2019    SBP (mmHg) 153 195 190 147 144    DBP (mmHg) 82 162 93 81 76    Pulse 92 87 99 106 92        Digital Medicine: Health  Follow Up    Patient reports that she was supposed to have an eye doctor appt, but she had problems with her transportation service. Patient reports that the lift in the van was broken, and she is unable to climb the stairs to get into the van, so she was forced to cancel her appt. Patient expresses her frustration.    Patient admits that she needs to be checking her BP more often, but she describes some recent stressors. Patient states that she had just bought groceries at the beginning of the month, but then her fridge broke. Patient states that she lost about half of her groceries.

## 2019-08-20 ENCOUNTER — PATIENT MESSAGE (OUTPATIENT)
Dept: INTERNAL MEDICINE | Facility: CLINIC | Age: 69
End: 2019-08-20

## 2019-08-20 ENCOUNTER — LAB VISIT (OUTPATIENT)
Dept: LAB | Facility: OTHER | Age: 69
End: 2019-08-20
Payer: MEDICARE

## 2019-08-20 ENCOUNTER — OFFICE VISIT (OUTPATIENT)
Dept: INTERNAL MEDICINE | Facility: CLINIC | Age: 69
End: 2019-08-20
Attending: INTERNAL MEDICINE
Payer: MEDICARE

## 2019-08-20 ENCOUNTER — CLINICAL SUPPORT (OUTPATIENT)
Dept: SMOKING CESSATION | Facility: CLINIC | Age: 69
End: 2019-08-20
Payer: COMMERCIAL

## 2019-08-20 VITALS
HEART RATE: 92 BPM | HEIGHT: 65 IN | WEIGHT: 230.63 LBS | OXYGEN SATURATION: 97 % | SYSTOLIC BLOOD PRESSURE: 130 MMHG | DIASTOLIC BLOOD PRESSURE: 60 MMHG | BODY MASS INDEX: 38.42 KG/M2

## 2019-08-20 DIAGNOSIS — F17.200 TOBACCO DEPENDENCE: ICD-10-CM

## 2019-08-20 DIAGNOSIS — I10 HYPERTENSION, BENIGN: ICD-10-CM

## 2019-08-20 DIAGNOSIS — E55.9 VITAMIN D DEFICIENCY: ICD-10-CM

## 2019-08-20 DIAGNOSIS — E87.1 HYPONATREMIA: ICD-10-CM

## 2019-08-20 DIAGNOSIS — M80.00XD AGE-RELATED OSTEOPOROSIS WITH CURRENT PATHOLOGICAL FRACTURE WITH ROUTINE HEALING, SUBSEQUENT ENCOUNTER: ICD-10-CM

## 2019-08-20 DIAGNOSIS — I50.30 HEART FAILURE WITH PRESERVED EJECTION FRACTION: ICD-10-CM

## 2019-08-20 DIAGNOSIS — F17.210 HEAVY CIGARETTE SMOKER (20-39 PER DAY): ICD-10-CM

## 2019-08-20 DIAGNOSIS — E87.6 HYPOKALEMIA: ICD-10-CM

## 2019-08-20 DIAGNOSIS — R91.1 PULMONARY NODULE: ICD-10-CM

## 2019-08-20 DIAGNOSIS — D64.9 ANEMIA, UNSPECIFIED TYPE: ICD-10-CM

## 2019-08-20 DIAGNOSIS — E66.01 SEVERE OBESITY (BMI 35.0-39.9) WITH COMORBIDITY: ICD-10-CM

## 2019-08-20 DIAGNOSIS — E04.1 THYROID NODULE: ICD-10-CM

## 2019-08-20 DIAGNOSIS — E83.42 HYPOMAGNESEMIA: ICD-10-CM

## 2019-08-20 DIAGNOSIS — E78.5 HYPERLIPIDEMIA, UNSPECIFIED HYPERLIPIDEMIA TYPE: ICD-10-CM

## 2019-08-20 DIAGNOSIS — M80.08XA AGE-RELATED OSTEOPOROSIS WITH CURRENT PATHOLOGICAL FRACTURE OF VERTEBRA, INITIAL ENCOUNTER: ICD-10-CM

## 2019-08-20 DIAGNOSIS — G89.28 CHRONIC PAIN FOLLOWING SURGERY OR PROCEDURE: ICD-10-CM

## 2019-08-20 DIAGNOSIS — Z99.81 ON HOME OXYGEN THERAPY: ICD-10-CM

## 2019-08-20 DIAGNOSIS — F17.210 MODERATE CIGARETTE SMOKER (10-19 PER DAY): Primary | ICD-10-CM

## 2019-08-20 DIAGNOSIS — F11.20 UNCOMPLICATED OPIOID DEPENDENCE: ICD-10-CM

## 2019-08-20 DIAGNOSIS — S32.030D COMPRESSION FRACTURE OF L3 LUMBAR VERTEBRA WITH ROUTINE HEALING: ICD-10-CM

## 2019-08-20 DIAGNOSIS — G89.4 CHRONIC PAIN SYNDROME: Primary | ICD-10-CM

## 2019-08-20 DIAGNOSIS — E53.8 B12 DEFICIENCY: ICD-10-CM

## 2019-08-20 DIAGNOSIS — F41.1 GAD (GENERALIZED ANXIETY DISORDER): ICD-10-CM

## 2019-08-20 LAB
ALBUMIN SERPL BCP-MCNC: 3.3 G/DL (ref 3.5–5.2)
ALP SERPL-CCNC: 71 U/L (ref 55–135)
ALT SERPL W/O P-5'-P-CCNC: 14 U/L (ref 10–44)
ANION GAP SERPL CALC-SCNC: 11 MMOL/L (ref 8–16)
AST SERPL-CCNC: 15 U/L (ref 10–40)
BILIRUB SERPL-MCNC: 0.6 MG/DL (ref 0.1–1)
BUN SERPL-MCNC: 13 MG/DL (ref 8–23)
CALCIUM SERPL-MCNC: 8.8 MG/DL (ref 8.7–10.5)
CHLORIDE SERPL-SCNC: 100 MMOL/L (ref 95–110)
CO2 SERPL-SCNC: 20 MMOL/L (ref 23–29)
CREAT SERPL-MCNC: 1 MG/DL (ref 0.5–1.4)
EST. GFR  (AFRICAN AMERICAN): >60 ML/MIN/1.73 M^2
EST. GFR  (NON AFRICAN AMERICAN): 58 ML/MIN/1.73 M^2
GLUCOSE SERPL-MCNC: 96 MG/DL (ref 70–110)
POTASSIUM SERPL-SCNC: 4.5 MMOL/L (ref 3.5–5.1)
PROT SERPL-MCNC: 6.5 G/DL (ref 6–8.4)
SODIUM SERPL-SCNC: 131 MMOL/L (ref 136–145)

## 2019-08-20 PROCEDURE — 99999 PR PBB SHADOW E&M-EST. PATIENT-LVL IV: CPT | Mod: PBBFAC,,, | Performed by: INTERNAL MEDICINE

## 2019-08-20 PROCEDURE — 99999 PR PBB SHADOW E&M-EST. PATIENT-LVL II: CPT | Mod: PBBFAC,,,

## 2019-08-20 PROCEDURE — 3075F SYST BP GE 130 - 139MM HG: CPT | Mod: CPTII,S$GLB,, | Performed by: INTERNAL MEDICINE

## 2019-08-20 PROCEDURE — 99999 PR PBB SHADOW E&M-EST. PATIENT-LVL II: ICD-10-PCS | Mod: PBBFAC,,,

## 2019-08-20 PROCEDURE — 99214 OFFICE O/P EST MOD 30 MIN: CPT | Mod: S$GLB,,, | Performed by: INTERNAL MEDICINE

## 2019-08-20 PROCEDURE — 80053 COMPREHEN METABOLIC PANEL: CPT

## 2019-08-20 PROCEDURE — 3075F PR MOST RECENT SYSTOLIC BLOOD PRESS GE 130-139MM HG: ICD-10-PCS | Mod: CPTII,S$GLB,, | Performed by: INTERNAL MEDICINE

## 2019-08-20 PROCEDURE — 99403 PR PREVENT COUNSEL,INDIV,45 MIN: ICD-10-PCS | Mod: S$GLB,,,

## 2019-08-20 PROCEDURE — 99999 PR PBB SHADOW E&M-EST. PATIENT-LVL IV: ICD-10-PCS | Mod: PBBFAC,,, | Performed by: INTERNAL MEDICINE

## 2019-08-20 PROCEDURE — 36415 COLL VENOUS BLD VENIPUNCTURE: CPT

## 2019-08-20 PROCEDURE — 3078F DIAST BP <80 MM HG: CPT | Mod: CPTII,S$GLB,, | Performed by: INTERNAL MEDICINE

## 2019-08-20 PROCEDURE — 3078F PR MOST RECENT DIASTOLIC BLOOD PRESSURE < 80 MM HG: ICD-10-PCS | Mod: CPTII,S$GLB,, | Performed by: INTERNAL MEDICINE

## 2019-08-20 PROCEDURE — 1101F PR PT FALLS ASSESS DOC 0-1 FALLS W/OUT INJ PAST YR: ICD-10-PCS | Mod: CPTII,S$GLB,, | Performed by: INTERNAL MEDICINE

## 2019-08-20 PROCEDURE — 99403 PREV MED CNSL INDIV APPRX 45: CPT | Mod: S$GLB,,,

## 2019-08-20 PROCEDURE — 99214 PR OFFICE/OUTPT VISIT, EST, LEVL IV, 30-39 MIN: ICD-10-PCS | Mod: S$GLB,,, | Performed by: INTERNAL MEDICINE

## 2019-08-20 PROCEDURE — 99499 UNLISTED E&M SERVICE: CPT | Mod: S$GLB,,, | Performed by: INTERNAL MEDICINE

## 2019-08-20 PROCEDURE — 99499 RISK ADDL DX/OHS AUDIT: ICD-10-PCS | Mod: S$GLB,,, | Performed by: INTERNAL MEDICINE

## 2019-08-20 PROCEDURE — 1101F PT FALLS ASSESS-DOCD LE1/YR: CPT | Mod: CPTII,S$GLB,, | Performed by: INTERNAL MEDICINE

## 2019-08-20 NOTE — PROGRESS NOTES
Individual Follow-Up Form    8/20/2019    Quit Date:    Clinical Status of Patient: Outpatient    Length of Service: 60 minutes    Continuing Medication: yes  Chantix    Other Medications: Nicotine patch     Target Symptoms: Withdrawal and medication side effects. The following were  rated moderate (3) to severe (4) on TCRS:  · Moderate (3): none  · Severe (4): none    Comments: Patient has smoked anywhere between none to 1 ppd.  The patient remains on the prescribed tobacco cessation medication regimen of 1 mg Chantix BID without any negative side effects at this time. Patient is using meditation and deep breathing to aid with stress. Patient was encouraged to start thinking about quit date.  completion of TCRS (Tobacco Cessation Rating Scale) reviewed strategies, habitual behavior, stress, and high risk situations. Introduced stress with addition interventions, SOLVE, relaxation with interventions, nutrition, exercise, weight gain, and the importance of rewarding oneself for accomplishments toward becoming tobacco free. Open discussion of all items with interventions.   The patient denies any abnormal behavioral or mental changes at this time. The patient will continue with group therapy sessions and medication monitoring by CTTS. Prescribed medication management will be by physician.          Diagnosis: F17.210    Next Visit: 1 week

## 2019-08-20 NOTE — Clinical Note
Patient has smoked anywhere between none to 1 ppd.  The patient remains on the prescribed tobacco cessation medication regimen of 1 mg Chantix BID without any negative side effects at this time. Patient is using meditation and deep breathing to aid with stress. Patient was encouraged to start thinking about quit date.

## 2019-08-20 NOTE — PROGRESS NOTES
"Subjective:   Patient ID: Nalini Varma is a 68 y.o. female  Chief complaint:   Chief Complaint   Patient presents with    Hypertension     f/u       HPI  Pt with hx of HFpEF (had LHC and RHC 2018), chronic hyponatremia, anasarca, HTN, tobacco dep, COPD on 2L home O2, osteoporosis with L3 compression fracture on alendronate and then switched to prolia due to changes in renal function, hx of LESLY 2/2 GIB due to NSAID use, hypoMag, hyponatremia suspected 2/2 SIADH in past,  thyroid nodule: s/p FNA 5/2017 and benign, hx of pulm nodules with repeat CT 3/20/2019 with stable opacities that do not require additional f/u, OA of shoulders and knees with L knee > Right after surgery followed by ortho outside of [facility] - sx currently managed with norco bid    Chronic pain: due for urine tox screen   - nelson norco bid   - fill rx at same pharmacy   - no early refill requests   - followed by ortho at [facility] reviewed and consistent     Hyponatremia:   Seen by renal 8/15/19 and had labs drawn today     Tob: enrolled in tob cess counseling   utd on ct chest screen 3/20/2019  - smoking 10-12 per day - some days will have none  - nelson chantix     Diastolic heart failure and HTN: bp at goal today!   Taking torsemide 20, losartan 50, amlodipine 10    - weight stable   - following water restriction     Thyroid nodule: s/p bx 2017 - due for repeat thyroid us     HLD:  nelson atorvastatin 40    COPD on 2L oxygen : stable - nelson inhalers   utd on ct chest as above      Osteoporosis - dexa - last was 8/23/2017  - switched to prolia in April 2019 due to fluctuating renal function     Obesity: has cut donw on portions and eating less intentionally   - monitoring fluids as above     Review of Systems    Objective:  Vitals:    08/20/19 1349   BP: 130/60   Pulse: 92   SpO2: 97%   Weight: 104.6 kg (230 lb 9.6 oz)   Height: 5' 5" (1.651 m)     Body mass index is 38.37 kg/m².    Physical Exam   Constitutional: She is oriented to person, place, " and time. She appears well-developed and well-nourished.   HENT:   Head: Normocephalic and atraumatic.   Eyes: Conjunctivae and EOM are normal.   Neck: Normal range of motion. Neck supple.   Cardiovascular: Normal rate, regular rhythm and intact distal pulses.   Murmur heard.  Pulmonary/Chest: Effort normal and breath sounds normal. No stridor. No respiratory distress.   NC in place    Abdominal: Soft. Normal appearance and bowel sounds are normal.   Musculoskeletal: She exhibits edema. She exhibits no tenderness.   +2 edema of B LE to knees (improved from prior)   Neurological: She is alert and oriented to person, place, and time. She has normal strength. Gait normal.   Skin: Skin is warm, dry and intact. No cyanosis. Nails show no clubbing.   Psychiatric: She has a normal mood and affect. Her speech is normal and behavior is normal. Cognition and memory are normal.   Vitals reviewed.      Assessment:  1. Chronic pain syndrome    2. Thyroid nodule    3. Age-related osteoporosis with current pathological fracture with routine healing, subsequent encounter    4. Age-related osteoporosis with current pathological fracture of vertebra, initial encounter     5. Compression fracture of L3 lumbar vertebra with routine healing    6. MARY (generalized anxiety disorder)    7. Heart failure with preserved ejection fraction    8. Hyperlipidemia, unspecified hyperlipidemia type    9. Hypertension, benign    10. Hypomagnesemia    11. Hypokalemia    12. Hyponatremia    13. On home oxygen therapy    14. Pulmonary nodule    15. Severe obesity (BMI 35.0-39.9) with comorbidity    16. Tobacco dependence    17. Uncomplicated opioid dependence    18. Chronic pain following surgery or procedure    19. Anemia, unspecified type    20. Vitamin D deficiency    21. B12 deficiency        Plan:  Nalini was seen today for hypertension.    Diagnoses and all orders for this visit:    Chronic pain syndrome  -     Cancel: TOXICOLOGY SCREEN, URINE,  RANDOM (COMPLIANCE)    Thyroid nodule  -     US Soft Tissue Head Neck Thyroid; Future  -     TSH; Future    Age-related osteoporosis with current pathological fracture with routine healing, subsequent encounter  -     DXA Bone Density Spine And Hip; Future    Age-related osteoporosis with current pathological fracture of vertebra, initial encounter   -     DXA Bone Density Spine And Hip; Future    Compression fracture of L3 lumbar vertebra with routine healing    MARY (generalized anxiety disorder)    Heart failure with preserved ejection fraction    Hyperlipidemia, unspecified hyperlipidemia type  -     Lipid panel; Future    Hypertension, benign    Hypomagnesemia  -     Magnesium; Future    Hypokalemia    Hyponatremia    On home oxygen therapy    Pulmonary nodule    Severe obesity (BMI 35.0-39.9) with comorbidity    Tobacco dependence    Uncomplicated opioid dependence    Chronic pain following surgery or procedure  -     TOXICOLOGY SCREEN, URINE, RANDOM (COMPLIANCE); Future    Anemia, unspecified type  -     CBC auto differential; Future  -     Ferritin; Future  -     Iron and TIBC; Future  -     Vitamin B12; Future  -     Comprehensive metabolic panel; Future    Vitamin D deficiency  -     Vitamin D; Future    B12 deficiency  -     Vitamin B12; Future    Cont meds   Keep appt with specialists   Check urine tox screen with labs       Health Maintenance   Topic Date Due    Colonoscopy  05/22/2020    DEXA SCAN  08/23/2020    Mammogram  02/20/2021    Lipid Panel  04/24/2023    TETANUS VACCINE  01/29/2025    Hepatitis C Screening  Completed    Pneumococcal Vaccine (65+ Low/Medium Risk)  Completed

## 2019-08-21 NOTE — TELEPHONE ENCOUNTER
Ms Enriquez I have changed your thyroid US and your Bone Density. Both are here at [facility], the Thyroid US is at 1200 pm and Bone Density is at 200 pm 9/10/19

## 2019-08-28 NOTE — PROGRESS NOTES
[facility] STAFF NOTE    The note from the fellow/resident was reviewed. I have personally interviewed and examined the patient. There were no additional findings with regards to the history or physical exam.    I agree with the assessment and plan of  Dr. Andi Velasco    Not quite clear of the origin for the hyponatremia. Urine findings not conclusive. Intermittent low urine sodium points toward lower effective arterial blood volume as possible cause (heart failure?).

## 2019-08-30 ENCOUNTER — OFFICE VISIT (OUTPATIENT)
Dept: CARDIOLOGY | Facility: CLINIC | Age: 69
End: 2019-08-30
Payer: MEDICARE

## 2019-08-30 ENCOUNTER — LAB VISIT (OUTPATIENT)
Dept: LAB | Facility: HOSPITAL | Age: 69
End: 2019-08-30
Attending: INTERNAL MEDICINE
Payer: MEDICARE

## 2019-08-30 VITALS
SYSTOLIC BLOOD PRESSURE: 146 MMHG | WEIGHT: 223.56 LBS | DIASTOLIC BLOOD PRESSURE: 74 MMHG | HEIGHT: 66 IN | HEART RATE: 91 BPM | BODY MASS INDEX: 35.93 KG/M2

## 2019-08-30 DIAGNOSIS — E87.6 HYPOKALEMIA: ICD-10-CM

## 2019-08-30 DIAGNOSIS — J44.9 CHRONIC OBSTRUCTIVE PULMONARY DISEASE, UNSPECIFIED COPD TYPE: ICD-10-CM

## 2019-08-30 DIAGNOSIS — E66.9 OBESITY (BMI 35.0-39.9 WITHOUT COMORBIDITY): ICD-10-CM

## 2019-08-30 DIAGNOSIS — I50.30 HEART FAILURE WITH PRESERVED EJECTION FRACTION, BORDERLINE, CLASS III: Primary | ICD-10-CM

## 2019-08-30 DIAGNOSIS — E78.5 HYPERLIPIDEMIA, UNSPECIFIED HYPERLIPIDEMIA TYPE: ICD-10-CM

## 2019-08-30 DIAGNOSIS — G89.28 CHRONIC PAIN FOLLOWING SURGERY OR PROCEDURE: ICD-10-CM

## 2019-08-30 DIAGNOSIS — F17.200 TOBACCO DEPENDENCE: ICD-10-CM

## 2019-08-30 DIAGNOSIS — E87.1 HYPONATREMIA: ICD-10-CM

## 2019-08-30 DIAGNOSIS — J96.11 CHRONIC RESPIRATORY FAILURE WITH HYPOXIA: ICD-10-CM

## 2019-08-30 DIAGNOSIS — I10 HYPERTENSION, BENIGN: ICD-10-CM

## 2019-08-30 LAB
AMPHET+METHAMPHET UR QL: NEGATIVE
BARBITURATES UR QL SCN>200 NG/ML: NEGATIVE
BENZODIAZ UR QL SCN>200 NG/ML: NEGATIVE
BZE UR QL SCN: NEGATIVE
CANNABINOIDS UR QL SCN: NEGATIVE
CREAT UR-MCNC: 95 MG/DL (ref 15–325)
ETHANOL UR-MCNC: <10 MG/DL
METHADONE UR QL SCN>300 NG/ML: NEGATIVE
OPIATES UR QL SCN: NORMAL
PCP UR QL SCN>25 NG/ML: NEGATIVE
TOXICOLOGY INFORMATION: NORMAL

## 2019-08-30 PROCEDURE — 99214 PR OFFICE/OUTPT VISIT, EST, LEVL IV, 30-39 MIN: ICD-10-PCS | Mod: S$GLB,,, | Performed by: INTERNAL MEDICINE

## 2019-08-30 PROCEDURE — 3078F DIAST BP <80 MM HG: CPT | Mod: CPTII,S$GLB,, | Performed by: INTERNAL MEDICINE

## 2019-08-30 PROCEDURE — 99499 RISK ADDL DX/OHS AUDIT: ICD-10-PCS | Mod: S$GLB,,, | Performed by: INTERNAL MEDICINE

## 2019-08-30 PROCEDURE — 99214 OFFICE O/P EST MOD 30 MIN: CPT | Mod: S$GLB,,, | Performed by: INTERNAL MEDICINE

## 2019-08-30 PROCEDURE — 3078F PR MOST RECENT DIASTOLIC BLOOD PRESSURE < 80 MM HG: ICD-10-PCS | Mod: CPTII,S$GLB,, | Performed by: INTERNAL MEDICINE

## 2019-08-30 PROCEDURE — 1101F PR PT FALLS ASSESS DOC 0-1 FALLS W/OUT INJ PAST YR: ICD-10-PCS | Mod: CPTII,S$GLB,, | Performed by: INTERNAL MEDICINE

## 2019-08-30 PROCEDURE — 3077F PR MOST RECENT SYSTOLIC BLOOD PRESSURE >= 140 MM HG: ICD-10-PCS | Mod: CPTII,S$GLB,, | Performed by: INTERNAL MEDICINE

## 2019-08-30 PROCEDURE — 99999 PR PBB SHADOW E&M-EST. PATIENT-LVL III: CPT | Mod: PBBFAC,,, | Performed by: INTERNAL MEDICINE

## 2019-08-30 PROCEDURE — 3077F SYST BP >= 140 MM HG: CPT | Mod: CPTII,S$GLB,, | Performed by: INTERNAL MEDICINE

## 2019-08-30 PROCEDURE — 1101F PT FALLS ASSESS-DOCD LE1/YR: CPT | Mod: CPTII,S$GLB,, | Performed by: INTERNAL MEDICINE

## 2019-08-30 PROCEDURE — 99999 PR PBB SHADOW E&M-EST. PATIENT-LVL III: ICD-10-PCS | Mod: PBBFAC,,, | Performed by: INTERNAL MEDICINE

## 2019-08-30 PROCEDURE — 99499 UNLISTED E&M SERVICE: CPT | Mod: S$GLB,,, | Performed by: INTERNAL MEDICINE

## 2019-08-30 PROCEDURE — 80307 DRUG TEST PRSMV CHEM ANLYZR: CPT

## 2019-08-30 RX ORDER — LOSARTAN POTASSIUM 50 MG/1
50 TABLET ORAL DAILY
Qty: 90 TABLET | Refills: 3 | Status: SHIPPED | OUTPATIENT
Start: 2019-08-30 | End: 2019-11-26

## 2019-08-30 NOTE — PROGRESS NOTES
Chart has been dictated using voice recognition software.  It is not been reviewed carefully for any transcriptional errors due to this technology.   Subjective:   Patient ID:  Nalini Varma is a 68 y.o. female who presents for follow-up of Heart Failure      HPI:  Patient with  nonischemic heart failure with normal ejection fraction, SIADH, Chronic obstructive pulmonary disease, Hypertension, Tobacco dependence, and Hyperlipidemia.    The patient continues to smoke 7 cigarettes per day although now she is on Chantix and a nicotine patch.  She denies any chest discomfort, palpitations, or syncope.  She does note occasional orthostatic lightheadedness.  Her shortness of breath has improved and she is able to walk the halls sometimes without oxygen.  She has rare edema with a significant episode of prior 2 months ago treated easily with an increase in diuretic.  Patient has also been actively losing weight both from fluid and calories.    Past Medical History:   Diagnosis Date    Allergy     Anemia due to gastrointestinal blood loss     LESLY from gastric ulcer due to chronic nsaid use    Anxiety     Arthritis     CKD (chronic kidney disease) stage 3, GFR 30-59 ml/min     followed by [facility] study - recieved labs from 2017 and 2/2018    Gastric ulcer     H/O knee surgery 2001    right    Heart failure with preserved ejection fraction 8/20/2018    Hx of psychiatric care     Hyperlipidemia     Hypertension     Hyponatremia     Psychiatric problem     Therapy     Tobacco abuse        Outpatient Medications Prior to Visit   Medication Sig Dispense Refill    albuterol (VENTOLIN HFA) 90 mcg/actuation inhaler inhale 1-2 puffs as needed every 6 hours for wheezing and shortness of breath 18 g 11    amLODIPine (NORVASC) 10 MG tablet Take 1 tablet (10 mg total) by mouth once daily. 90 tablet 3    aspirin (ECOTRIN) 81 MG EC tablet Take 81 mg by mouth once daily.      atorvastatin (LIPITOR) 40 MG tablet TAKE  1 TABLET BY MOUTH EVERY DAILY 90 tablet 3    biotin 1 mg Cap Take by mouth.      cyanocobalamin 1,000 mcg TbSR Take 1,000 mcg by mouth once daily. (Patient taking differently: Take 1,000 mcg by mouth daily as needed. ) 90 tablet 1    denosumab (PROLIA) 60 mg/mL Syrg Inject 1 mL (60 mg total) into the skin every 6 (six) months. 2 mL 0    famotidine (PEPCID) 20 MG tablet TAKE 1 TABLET BY MOUTH TWO TIMES A  tablet 3    fluticasone (FLONASE) 50 mcg/actuation nasal spray 1 spray by Each Nare route 2 (two) times daily as needed for Rhinitis.      fluticasone propion-salmeterol (ADVAIR HFA) 115-21 mcg/actuation HFAA Inhale 2 puffs into the lungs every 12 (twelve) hours. 36 g 3    guaiFENesin (MUCINEX) 600 mg 12 hr tablet Take 1,200 mg by mouth 2 (two) times daily as needed for Congestion.      HYDROcodone-acetaminophen (NORCO)  mg per tablet Take 1 tablet by mouth every 12 (twelve) hours as needed for Pain. 60 tablet 0    loratadine (CLARITIN) 10 mg tablet Take 10 mg by mouth once daily.      MAGNESIUM ASCORBATE, BULK, MISC       magnesium oxide 400 mg Cap Take 1 capsule by mouth every 12 (twelve) hours.      nicotine (NICODERM CQ) 21 mg/24 hr Place 1 patch onto the skin once daily. 14 patch 0    potassium chloride SA (K-DUR,KLOR-CON) 10 MEQ tablet Take 1 tablet (10 mEq total) by mouth once daily. 90 tablet 3    torsemide (DEMADEX) 20 MG Tab Take 2 tablets (40 mg total) by mouth once daily. 180 tablet 3    umeclidinium (INCRUSE ELLIPTA) 62.5 mcg/actuation DsDv Inhale 1 puff into the lungs once daily. Controller 90 each 3    varenicline (CHANTIX) 1 mg Tab Take 1 tablet (1 mg total) by mouth 2 (two) times daily. 60 tablet 0    vitamin D 1000 units Tab Take 1,000 Units by mouth once daily.      walker (ULTRA-LIGHT ROLLATOR) Misc 1 each by Misc.(Non-Drug; Combo Route) route once daily. 1 each 0    losartan (COZAAR) 50 MG tablet Take 1 tablet (50 mg total) by mouth once daily. 90 tablet 0     No  "facility-administered medications prior to visit.        ROS - No change from prior visit in neurologic, respiratory, endocrine, GI, hematologic, or constitutional complaints, the patient has significant bilateral shoulder pain which makes it difficult to physically support herself.  Objective:   Physical Exam   Constitutional: She is oriented to person, place, and time. She appears well-developed and well-nourished. Nasal cannula in place.   BP (!) 146/74   Pulse 91   Ht 5' 6" (1.676 m)   Wt 101.4 kg (223 lb 8.7 oz)   BMI 36.08 kg/m²   O2 sat on RA - 96%  Obese   Neck: Neck supple. No JVD present. Carotid bruit is not present. No thyromegaly present.   Cardiovascular: Normal rate, regular rhythm, S1 normal, S2 normal and intact distal pulses. Exam reveals S4. Exam reveals no friction rub.   Murmur heard.   Harsh midsystolic murmur is present with a grade of 2/6 at the upper right sternal border radiating to the neck, lower right sternal border and apex. Adequate A2  Pulmonary/Chest: Breath sounds normal. She has no wheezes. She has no rales.   Abdominal: Soft. Bowel sounds are normal. There is no hepatosplenomegaly. There is no tenderness.   Musculoskeletal: She exhibits no edema.   Neurological: She is alert and oriented to person, place, and time. She has normal strength.   Skin: No cyanosis. Nails show no clubbing.         Lab Results   Component Value Date     (L) 08/20/2019    K 4.5 08/20/2019    BUN 13 08/20/2019    CREATININE 1.0 08/20/2019    GLU 96 08/20/2019    HGBA1C 5.3 12/11/2018    CHOL 136 04/24/2018    HDL 74 04/24/2018    LDLCALC 49.2 (L) 04/24/2018    TRIG 64 04/24/2018    CHOLHDL 54.4 (H) 04/24/2018    HGB 10.3 (L) 03/09/2019    HCT 31.6 (L) 03/09/2019     03/09/2019    INR 0.9 12/10/2018       Assessment:     1. Heart failure with preserved ejection fraction, borderline, class III    2. Chronic obstructive pulmonary disease, unspecified COPD type    3. Hypertension, benign    4. " Tobacco dependence    5. Hyperlipidemia, unspecified hyperlipidemia type    6. Hyponatremia    7. Hypokalemia    8. Chronic respiratory failure with hypoxia    9. Obesity (BMI 35.0-39.9 without comorbidity)     Is  Patient is stable with mild improvement in her symptoms.  She has no evidence of significant heart failure at this time.  Her major problems appear to be her shoulders and her pulmonary disease.  The patient was again encouraged to stop smoking.  No changes in her medications will be made at this time. Unless there are intervening problems, patient should return for re-evaluation in 6 months.   Plan:     Nalini was seen today for heart failure.    Diagnoses and all orders for this visit:    Heart failure with preserved ejection fraction, borderline, class III    Chronic obstructive pulmonary disease, unspecified COPD type    Hypertension, benign    Tobacco dependence    Hyperlipidemia, unspecified hyperlipidemia type    Hyponatremia    Hypokalemia    Chronic respiratory failure with hypoxia    Obesity (BMI 35.0-39.9 without comorbidity)          Parvez Ortez MD  Consultative Cardiology

## 2019-09-02 DIAGNOSIS — F17.210 HEAVY CIGARETTE SMOKER (20-39 PER DAY): ICD-10-CM

## 2019-09-02 RX ORDER — IBUPROFEN 200 MG
1 TABLET ORAL DAILY
Qty: 14 PATCH | Refills: 0 | Status: CANCELLED | OUTPATIENT
Start: 2019-09-02

## 2019-09-03 RX ORDER — IBUPROFEN 200 MG
1 TABLET ORAL DAILY
Qty: 28 PATCH | Refills: 0 | Status: SHIPPED | OUTPATIENT
Start: 2019-09-03 | End: 2019-10-09 | Stop reason: SDUPTHER

## 2019-09-06 ENCOUNTER — PATIENT MESSAGE (OUTPATIENT)
Dept: INTERNAL MEDICINE | Facility: CLINIC | Age: 69
End: 2019-09-06

## 2019-09-06 ENCOUNTER — PATIENT OUTREACH (OUTPATIENT)
Dept: OTHER | Facility: OTHER | Age: 69
End: 2019-09-06

## 2019-09-06 DIAGNOSIS — S32.030D COMPRESSION FRACTURE OF L3 LUMBAR VERTEBRA WITH ROUTINE HEALING: ICD-10-CM

## 2019-09-06 RX ORDER — HYDROCODONE BITARTRATE AND ACETAMINOPHEN 10; 325 MG/1; MG/1
1 TABLET ORAL EVERY 12 HOURS PRN
Qty: 60 TABLET | Refills: 0 | Status: SHIPPED | OUTPATIENT
Start: 2019-09-10 | End: 2019-10-10 | Stop reason: SDUPTHER

## 2019-09-06 NOTE — PROGRESS NOTES
Last 5 Patient Entered Readings                                      Current 30 Day Average: 142/94     Recent Readings 9/4/2019 9/3/2019 9/2/2019 8/27/2019 8/14/2019    SBP (mmHg) 112 152 147 148 153    DBP (mmHg) 66 87 87 81 82    Pulse 83 79 92 89 92        Hypertension Medications     amLODIPine (NORVASC) 10 MG tablet Take 1 tablet (10 mg total) by mouth once daily.    losartan (COZAAR) 50 MG tablet Take 1 tablet (50 mg total) by mouth once daily.    torsemide (DEMADEX) 20 MG Tab Take 2 tablets (40 mg total) by mouth once daily.     Called patient for BP follow up. Patient says that last month was stressful for her. Patient says that she was out of medication for a few days and contributes this to elevated BP. Yesterday, she says she was particularly tired. She says she feels good now. She says that she is slowing cutting back on smoking and has been counting calories and losing weight.     1) 30-day BP average exceeds goal of <130/<80. Continue current BP medications.   2) Patient knows to contact me with any non-urgent clinical changes or concerns. Go to ED or call [facility] on Call for emergencies.   3) Will defer lifestyle counseling to digital medicine health . Continue lifestyle changes.   4) Will continue to follow up.     Radha Ayala, Pharm.D.   Digital Medicine Clinical Pharmacist  [phone]

## 2019-09-10 ENCOUNTER — HOSPITAL ENCOUNTER (OUTPATIENT)
Dept: RADIOLOGY | Facility: OTHER | Age: 69
Discharge: HOME | End: 2019-09-10
Attending: INTERNAL MEDICINE
Payer: MEDICARE

## 2019-09-10 ENCOUNTER — CLINICAL SUPPORT (OUTPATIENT)
Dept: SMOKING CESSATION | Facility: CLINIC | Age: 69
End: 2019-09-10
Payer: COMMERCIAL

## 2019-09-10 DIAGNOSIS — M80.08XA AGE-RELATED OSTEOPOROSIS WITH CURRENT PATHOLOGICAL FRACTURE OF VERTEBRA, INITIAL ENCOUNTER: ICD-10-CM

## 2019-09-10 DIAGNOSIS — E04.1 THYROID NODULE: ICD-10-CM

## 2019-09-10 DIAGNOSIS — M80.00XD AGE-RELATED OSTEOPOROSIS WITH CURRENT PATHOLOGICAL FRACTURE WITH ROUTINE HEALING, SUBSEQUENT ENCOUNTER: ICD-10-CM

## 2019-09-10 DIAGNOSIS — F17.210 LIGHT CIGARETTE SMOKER (1-9 CIGS/DAY): Primary | ICD-10-CM

## 2019-09-10 PROCEDURE — 99404 PREV MED CNSL INDIV APPRX 60: CPT | Mod: S$GLB,,,

## 2019-09-10 PROCEDURE — 77080 DEXA BONE DENSITY SPINE HIP: ICD-10-PCS | Mod: 26,,, | Performed by: RADIOLOGY

## 2019-09-10 PROCEDURE — 99999 PR PBB SHADOW E&M-EST. PATIENT-LVL II: ICD-10-PCS | Mod: PBBFAC,,,

## 2019-09-10 PROCEDURE — 99404 PR PREVENT COUNSEL,INDIV,60 MIN: ICD-10-PCS | Mod: S$GLB,,,

## 2019-09-10 PROCEDURE — 76536 US EXAM OF HEAD AND NECK: CPT | Mod: TC

## 2019-09-10 PROCEDURE — 76536 US EXAM OF HEAD AND NECK: CPT | Mod: 26,,, | Performed by: RADIOLOGY

## 2019-09-10 PROCEDURE — 77080 DXA BONE DENSITY AXIAL: CPT | Mod: 26,,, | Performed by: RADIOLOGY

## 2019-09-10 PROCEDURE — 76536 US SOFT TISSUE HEAD NECK THYROID: ICD-10-PCS | Mod: 26,,, | Performed by: RADIOLOGY

## 2019-09-10 PROCEDURE — 77080 DXA BONE DENSITY AXIAL: CPT | Mod: TC

## 2019-09-10 PROCEDURE — 99999 PR PBB SHADOW E&M-EST. PATIENT-LVL II: CPT | Mod: PBBFAC,,,

## 2019-09-10 NOTE — PROGRESS NOTES
Individual Follow-Up Form    9/10/2019    Quit Date:     Clinical Status of Patient: Outpatient    Length of Service: 45 minutes    Continuing Medication: yes  Chantix    Other Medications: Nicotine patch     Target Symptoms: Withdrawal and medication side effects. The following were  rated moderate (3) to severe (4) on TCRS:  · Moderate (3): none  · Severe (4): none    Comments: Patient is smoking on average 7 cpd.  She reports a few days smoking close to 1pack when stressed.  Patient continue to use 1 mg Chantix BID without any negative side effects.  We discussed the possibility of setting a quit date and she chose 10/25/19.  We discussed finding other ways to deal with stress other than tobacco use.   completion of TCRS (Tobacco Cessation Rating Scale) reviewed strategies, cues, triggers, high risk situations, lapses, relapses, diet, exercise, stress, relaxation, sleep, habitual behavior, and life style changes.The patient denies any abnormal behavioral or mental changes at this time. The patient will continue with group therapy sessions and medication monitoring by CTTS. Prescribed medication management will be by physician.       Diagnosis: F17.210    Next Visit: 1 week

## 2019-09-23 ENCOUNTER — HOSPITAL ENCOUNTER (EMERGENCY)
Facility: OTHER | Age: 69
Discharge: HOME | End: 2019-09-23
Attending: EMERGENCY MEDICINE
Payer: MEDICARE

## 2019-09-23 VITALS
HEIGHT: 66 IN | WEIGHT: 210 LBS | DIASTOLIC BLOOD PRESSURE: 74 MMHG | HEART RATE: 102 BPM | OXYGEN SATURATION: 100 % | RESPIRATION RATE: 20 BRPM | TEMPERATURE: 98 F | BODY MASS INDEX: 33.75 KG/M2 | SYSTOLIC BLOOD PRESSURE: 157 MMHG

## 2019-09-23 DIAGNOSIS — M25.562 LEFT KNEE PAIN, UNSPECIFIED CHRONICITY: ICD-10-CM

## 2019-09-23 DIAGNOSIS — T84.84XA PAINFUL ORTHOPAEDIC HARDWARE: Primary | ICD-10-CM

## 2019-09-23 DIAGNOSIS — R52 PAIN: ICD-10-CM

## 2019-09-23 PROCEDURE — 25000003 PHARM REV CODE 250: Performed by: PHYSICIAN ASSISTANT

## 2019-09-23 PROCEDURE — 99283 EMERGENCY DEPT VISIT LOW MDM: CPT | Mod: 25

## 2019-09-23 RX ORDER — OXYCODONE HYDROCHLORIDE 5 MG/1
5 TABLET ORAL
Status: COMPLETED | OUTPATIENT
Start: 2019-09-23 | End: 2019-09-23

## 2019-09-23 RX ADMIN — OXYCODONE HYDROCHLORIDE 5 MG: 5 TABLET ORAL at 03:09

## 2019-09-23 NOTE — ED PROVIDER NOTES
"Encounter Date: 9/23/2019       History     Chief Complaint   Patient presents with    Knee Pain     +left knee pain x3 days. pt denies any recent trauma, stating " I moved and heard something pop".  pt arrived on 2LNC, respirations labored but pt reports" I am just breathing heavy because i had to get out the taxi. i feel fine"      Patient is a 68-year-old female with a past medical history of hypertension, CKD, left knee pain status post multiple procedures, presenting to the emergency room with complaints of left knee pain.  The patient states she felt a pop in her left knee about 3 days ago.  She states the pain worsens when she tries to move.  She states she feels like things are moving inside of it.  She admits that in 2007 she had a total knee replacement that required 3 further interventions due to multiple infections.  She states that in 2012, she broke her femur and had to have a plate placed.  She states that all these operations occurred at [facility] where her orthopedic is.  She denies any new fall or injury over the past few days.  She denies any numbness, tingling, weakness.This is the extent of the patient's complaints at this time.       The history is provided by the patient.     Review of patient's allergies indicates:   Allergen Reactions    Wellbutrin [bupropion hcl] Other (See Comments)     Hyponatremia and hypomagnesia     Zoloft [sertraline] Other (See Comments)     Hyponatremia and hypomagnesia      Past Medical History:   Diagnosis Date    Allergy     Anemia due to gastrointestinal blood loss     LESLY from gastric ulcer due to chronic nsaid use    Anxiety     Arthritis     CKD (chronic kidney disease) stage 3, GFR 30-59 ml/min     followed by [facility] study - recieved labs from 2017 and 2/2018    Gastric ulcer     H/O knee surgery 2001    right    Heart failure with preserved ejection fraction 8/20/2018    Hx of psychiatric care     Hyperlipidemia     " Hypertension     Hyponatremia     Psychiatric problem     Therapy     Tobacco abuse      Past Surgical History:   Procedure Laterality Date    ANGIOGRAM, CORONARY ARTERY N/A 7/20/2018    Performed by Will[facility] at [facility] CATH LAB    BREAST CYST EXCISION Right     1967    cataract surgery      COLONOSCOPY  2015    ESOPHAGOGASTRODUODENOSCOPY  2015    FRACTURE SURGERY      left femur    JOINT REPLACEMENT  2007    left knee x 2, 2nd performed 2/2 to MRSA infection 3 months after 1st surgery    ORIF FEMUR FRACTURE Left     TUBAL LIGATION       Family History   Problem Relation Age of Onset    Hypertension Mother     Alzheimer's disease Mother     Dementia Mother     Depression Mother     Myasthenia gravis Father     Arthritis Father     Rectal cancer Father     Hypertension Brother     Arthritis Brother     Colon cancer Brother     No Known Problems Son     Cancer Sister         brain    Melanoma Neg Hx     Breast cancer Neg Hx      Social History     Tobacco Use    Smoking status: Current Every Day Smoker     Packs/day: 1.50    Smokeless tobacco: Never Used   Substance Use Topics    Alcohol use: Yes     Alcohol/week: 7.0 standard drinks     Types: 7 Shots of liquor per week     Frequency: 4 or more times a week     Drinks per session: 1 or 2     Binge frequency: Never     Comment: at least 1 cocktail a day    Drug use: No     Review of Systems   Constitutional: Negative for activity change, appetite change, chills, fatigue and fever.   HENT: Negative for congestion, rhinorrhea and sore throat.    Eyes: Negative for photophobia and visual disturbance.   Respiratory: Negative for cough, shortness of breath and wheezing.    Cardiovascular: Negative for chest pain.   Gastrointestinal: Negative for abdominal pain, diarrhea, nausea and vomiting.   Genitourinary: Negative for dysuria, hematuria and urgency.   Musculoskeletal: Positive for arthralgias and joint swelling. Negative for  back pain, myalgias and neck pain.   Skin: Negative for color change and wound.   Neurological: Negative for weakness and headaches.   Psychiatric/Behavioral: Negative for agitation and confusion.       Physical Exam     Initial Vitals [09/23/19 1255]   BP Pulse Resp Temp SpO2   118/62 (!) 112 (!) 24 97.7 °F (36.5 °C) 97 %      MAP       --         Physical Exam    Nursing note and vitals reviewed.  Constitutional: She appears well-developed and well-nourished. She is not diaphoretic. She is cooperative.  Non-toxic appearance. She does not have a sickly appearance. She does not appear ill. No distress.   Chronically ill-appearing,  female, presenting to the emergency department seated in a walker.  Speaking clearly full sentences, on home supplemental oxygen.  No acute distress.   HENT:   Head: Normocephalic and atraumatic.   Right Ear: External ear normal.   Left Ear: External ear normal.   Nose: Nose normal.   Mouth/Throat: Oropharynx is clear and moist.   Eyes: Conjunctivae and EOM are normal.   Neck: Normal range of motion. Neck supple.   Cardiovascular: Normal rate, regular rhythm and normal heart sounds.   Pulmonary/Chest: Breath sounds normal. No respiratory distress. She has no wheezes.   Musculoskeletal: Normal range of motion.   Edema noted to the left knee with pain with range of motion and decreased range of motion.  Well-healed surgical scar at the midline. No erythema or warmth.  Normal strength and sensation.   Neurological: She is alert and oriented to person, place, and time. GCS eye subscore is 4. GCS verbal subscore is 5. GCS motor subscore is 6.   Skin: Skin is warm.   Psychiatric: She has a normal mood and affect. Her behavior is normal. Judgment and thought content normal.         ED Course   Procedures  Labs Reviewed - No data to display       Imaging Results           X-Ray Knee 3 View Left (Final result)  Result time 09/23/19 13:23:58    Final result by Elisha Monk MD (09/23/19  13:23:58)                 Impression:      Postsurgical changes of malleable plate and screw fixation of the lateral distal femur with fractured and migrated screws as above.  In addition there is perihardware lucency about multiple femoral screw suggesting loosening.    This report was flagged in Epic as abnormal.      Electronically signed by: Elisha Monk  Date:    09/23/2019  Time:    13:23             Narrative:    EXAMINATION:  XR KNEE 3 VIEW LEFT    CLINICAL HISTORY:  Pain, unspecified    TECHNIQUE:  AP, lateral, and Merchant views of the left knee were performed.    COMPARISON:  Multiple priors, most recent 08/14/2016    FINDINGS:  Postsurgical changes of malleable plate and screw fixation of the distal lateral femur.  The second superior most femoral screw is fractured, new from prior exam.  The 5th most superior screw is displaced laterally, new from prior exam.  The plate is lifted off of the cortex over the femoral condyle which is similar to prior exams.  Increased perihardware lucency about multiple femoral screws.    Postsurgical changes of a total knee arthroplasty.  The arthroplasty hardware appears intact.    Heterotopic ossification is increased about the distal femoral and at the level of the patellofemoral joint medially.  No displaced fracture identified.  Diffuse soft tissue swelling about the knee.  Moderate suprapatellar joint effusion.  No aggressive osseous abnormality.                              X-Rays:   Independently Interpreted Readings:   Other Readings:  X-ray left knee-significant hardware in place with concern for fractured hardware.  Compared to imaging from 2016, changes noted.    Medical Decision Making:   Initial Assessment:     Urgent evaluation of a 68 y.o. female with a past medical history of hypertension, CKD, left knee pain status post multiple procedures, presenting to the emergency department complaining of left knee pain. Patient is afebrile, nontoxic appearing and  hemodynamically stable. Physical exam reveals edema with tenderness to palpation of the left knee, imaging ordered from provider in triage.      Independently Interpreted Test(s):   I have ordered and independently interpreted X-rays - see prior notes.  Clinical Tests:   Radiological Study: Ordered and Reviewed  ED Management:    X-ray left knee shows postsurgical changes malleable plate and screw fixation of the lateral distal femur with fractured and migrated screws. Also signs concerning for screw loosening.     Patient states she was aware of some screw loosening however has never been told that anything was fractured.  Will attempt to discuss the case with Orthopedics on-call.    3:39 PM Paged ortho    3:42 PM Spoke with Dr. Walls, on call for orthopedics. Reviewed images. Does not feel that emergent intervention is necessary,    recommends close follow up with Dr. Valverde for further evaluation.     Patient is counseled on home care and treatment.  Urged to obtain close follow up with Orthopedics for repeat management.  Discharged home in stable condition.The patient was instructed to follow up with a primary care provider in 2 days or to return to the emergency department for worsening symptoms. The treatment plan was discussed with the patient who demonstrated understanding and comfort with plan. The patient's history, physical exam, and plan of care was discussed with and agreed upon with my supervising physician.           This note was created using M Modal Fluency Direct. There may be typographical errors secondary to dictation.                      ED Course as of Sep 23 1552   Mon Sep 23, 2019   1455 Patient presents to the ED with c/o left knee pain and popping. GUERA Valverde, is pt's Orthopedic Surgeon       Patient seen and medically screened by the Physician in Triage due to ED crowding. Appropriate tests and/or medications ordered. A medical screening exam has been performed. The care will  be assumed by myself or another provider when treatment space becomes available. I am not assuming care of this patient at this time. 2:55 PM. AMG       [AG]      ED Course User Index  [AG] Karel Ching PA-C     Clinical Impression:     1. Painful orthopaedic hardware    2. Pain    3. Left knee pain, unspecified chronicity           Disposition:   Disposition: Discharged  Condition: Stable                        Marie Ortega PA-C  09/23/19 5750

## 2019-09-23 NOTE — ED NOTES
"Pt c/o L knee pain; hx of L knee replacement; states "I heard something pop." Pt AAOx4 and appropriate at this time. Respirations even and unlabored. No acute distress noted. Pt sitting in home walker at this time.   "

## 2019-09-30 ENCOUNTER — PATIENT OUTREACH (OUTPATIENT)
Dept: OTHER | Facility: OTHER | Age: 69
End: 2019-09-30

## 2019-09-30 NOTE — PROGRESS NOTES
Digital Medicine: Health  Follow-Up    Describes recent ED visit- patient states that she heard a pop in her knee, and was unable to bear weight on that leg. Has had little pain relief thus far        Follow Up  Follow-up reason(s): reading review      Readings are trending down           Tobacco and Alcohol:   Is patient considering quitting smoking? yes, in next 30 days    Smoking urge triggers: stressful situations    Patient has been cutting back on smoking and is pleased with her progress thus far. She notes that her work with smoking cessation has helped.     Patient is eligible for referral to smoking cessation.        [facility]    INTERVENTION(S)  encouragement/support      There are no preventive care reminders to display for this patient.    Last 5 Patient Entered Readings                                      Current 30 Day Average: 130/78     Recent Readings 9/20/2019 9/11/2019 9/4/2019 9/3/2019 9/2/2019    SBP (mmHg) 110 127 112 152 147    DBP (mmHg) 68 81 66 87 87    Pulse 87 103 83 79 92

## 2019-10-03 DIAGNOSIS — N18.30 CHRONIC KIDNEY DISEASE, STAGE III (MODERATE): Primary | ICD-10-CM

## 2019-10-03 DIAGNOSIS — E87.1 HYPONATREMIA: ICD-10-CM

## 2019-10-04 ENCOUNTER — TELEPHONE (OUTPATIENT)
Dept: NEPHROLOGY | Facility: CLINIC | Age: 69
End: 2019-10-04

## 2019-10-04 NOTE — TELEPHONE ENCOUNTER
----- Message from Andi Velasco MD sent at 10/3/2019  1:55 PM CDT -----  Regarding: FW: Tolvaptan case  I sent urine studies and 2D echo as requested by Dr. Hernández. Please schedule them. Thanks  ----- Message -----  From: Timothy Hernández MD  Sent: 8/18/2019   2:10 PM CDT  To: Andi Velasco MD  Subject: RE: Tolvaptan case                               I wouldn't. No clear history of decompensated heart failure. Repeat urine parameters and TTE. Plus, serum Na is not too bad.       ----- Message -----  From: Andi Velasco MD  Sent: 8/15/2019   5:29 PM  To: Timothy Hernández MD  Subject: Tolvaptan case                                   Please review possible indication for Tolvaptan.     Called pt and scheduled appt pt wanted to schedule for 11/19/2019 at [facility]

## 2019-10-09 ENCOUNTER — CLINICAL SUPPORT (OUTPATIENT)
Dept: SMOKING CESSATION | Facility: CLINIC | Age: 69
End: 2019-10-09
Payer: COMMERCIAL

## 2019-10-09 DIAGNOSIS — F17.210 LIGHT CIGARETTE SMOKER (1-9 CIGS/DAY): Primary | ICD-10-CM

## 2019-10-09 DIAGNOSIS — F17.210 HEAVY CIGARETTE SMOKER (20-39 PER DAY): ICD-10-CM

## 2019-10-09 PROCEDURE — 99999 PR PBB SHADOW E&M-EST. PATIENT-LVL I: CPT | Mod: PBBFAC,,,

## 2019-10-09 PROCEDURE — 99402 PR PREVENT COUNSEL,INDIV,30 MIN: ICD-10-PCS | Mod: S$GLB,,,

## 2019-10-09 PROCEDURE — 99402 PREV MED CNSL INDIV APPRX 30: CPT | Mod: S$GLB,,,

## 2019-10-09 PROCEDURE — 99999 PR PBB SHADOW E&M-EST. PATIENT-LVL I: ICD-10-PCS | Mod: PBBFAC,,,

## 2019-10-09 RX ORDER — IBUPROFEN 200 MG
1 TABLET ORAL DAILY
Qty: 28 PATCH | Refills: 0 | Status: SHIPPED | OUTPATIENT
Start: 2019-10-09 | End: 2019-11-19 | Stop reason: DRUGHIGH

## 2019-10-09 RX ORDER — VARENICLINE TARTRATE 1 MG/1
1 TABLET, FILM COATED ORAL 2 TIMES DAILY
Qty: 56 TABLET | Refills: 0 | Status: SHIPPED | OUTPATIENT
Start: 2019-10-09 | End: 2019-11-11

## 2019-10-09 NOTE — PROGRESS NOTES
Individual Follow-Up Form    10/9/2019    Quit Date:     Clinical Status of Patient: Outpatient    Length of Service: 30 minutes    Continuing Medication: yes  Chantix    Other Medications: none     Target Symptoms: Withdrawal and medication side effects. The following were  rated moderate (3) to severe (4) on TCRS:  · Moderate (3): none  · Severe (4): none    Comments: Spoke with patient over the phone for follow up.  Patient is smoking 5-6 cpd.  Patient continue to use 1 mg Chantix BID without any negative side effect at this time. She continue to struggle with wanting tobacco first AM and in the middle of the night.  She reports going 3 days being without tobacco after running out.  Patient has set a quit date of 10/25/19. We reviewed how to attempt a successful quit.  Patient is concerned that every year she has horrible birthdays and is afraid that will happen this year posing a threat to her quit.  We reviewed the importance of managing stress and habitual behavior.  Patient was advised to preplan birthday event to help eliminate possible stress.  Patient was also advised to notified those around her who smokes and bring her cigarettes that she quitting to aid successful attempt.  The patient denies any abnormal behavioral or mental changes at this time. The patient will continue with group therapy sessions and medication monitoring by CTTS. Prescribed medication management will be by physician.       Diagnosis: F17.210    Next Visit: 1 week

## 2019-10-10 ENCOUNTER — PATIENT MESSAGE (OUTPATIENT)
Dept: INTERNAL MEDICINE | Facility: CLINIC | Age: 69
End: 2019-10-10

## 2019-10-10 DIAGNOSIS — S32.030D COMPRESSION FRACTURE OF L3 VERTEBRA WITH ROUTINE HEALING: ICD-10-CM

## 2019-10-10 DIAGNOSIS — J42 CHRONIC BRONCHITIS, UNSPECIFIED CHRONIC BRONCHITIS TYPE: ICD-10-CM

## 2019-10-10 RX ORDER — HYDROCODONE BITARTRATE AND ACETAMINOPHEN 10; 325 MG/1; MG/1
1 TABLET ORAL EVERY 12 HOURS PRN
Qty: 60 TABLET | Refills: 0 | Status: SHIPPED | OUTPATIENT
Start: 2019-10-10 | End: 2019-11-19 | Stop reason: SDUPTHER

## 2019-10-14 ENCOUNTER — CLINICAL SUPPORT (OUTPATIENT)
Dept: INTERNAL MEDICINE | Facility: CLINIC | Age: 69
End: 2019-10-14
Payer: MEDICARE

## 2019-10-14 PROCEDURE — 90662 FLU VACCINE - HIGH DOSE (65+) PRESERVATIVE FREE IM: ICD-10-PCS | Mod: S$GLB,,, | Performed by: INTERNAL MEDICINE

## 2019-10-14 PROCEDURE — 90662 IIV NO PRSV INCREASED AG IM: CPT | Mod: S$GLB,,, | Performed by: INTERNAL MEDICINE

## 2019-10-14 PROCEDURE — G0008 FLU VACCINE - HIGH DOSE (65+) PRESERVATIVE FREE IM: ICD-10-PCS | Mod: S$GLB,,, | Performed by: INTERNAL MEDICINE

## 2019-10-14 PROCEDURE — G0008 ADMIN INFLUENZA VIRUS VAC: HCPCS | Mod: S$GLB,,, | Performed by: INTERNAL MEDICINE

## 2019-10-14 NOTE — PROGRESS NOTES
"Patient was given vaccine information sheet for the Prolia Injection. Prior to administration, the patient's allergies were reviewed. The area of injection was identified in the abdomen. This area was cleaned with alcohol. Using a 25g 5/8" safety needle, 60mg of Prolia was placed into the subcutaneous tissue. The injection site was dressed with a bandage. Patient experienced no complications and was discharged in stable condition. Patient was given aftercare instructions. Prolia Lot: [id number] Exp: 11/21          Patient was given vaccine information sheet for the Flu Vaccine. The area of injection was palpated using the acromion process as a landmark. This area was cleaned with alcohol. Using a 25g 1" safety needle, 0.5mL of the vaccine was placed into the left deltoid muscle. The injection site was dressed with a bandage. Patient experienced no complications and was discharged in stable condition. Fluzone High Dose vaccine Lot: [id number] Exp: [id number]      "

## 2019-10-15 ENCOUNTER — IMMUNIZATION (OUTPATIENT)
Dept: PHARMACY | Facility: CLINIC | Age: 69
End: 2019-10-15
Payer: MEDICARE

## 2019-10-17 ENCOUNTER — TELEPHONE (OUTPATIENT)
Dept: PRIMARY CARE CLINIC | Facility: CLINIC | Age: 69
End: 2019-10-17

## 2019-10-25 ENCOUNTER — PATIENT MESSAGE (OUTPATIENT)
Dept: PRIMARY CARE CLINIC | Facility: CLINIC | Age: 69
End: 2019-10-25

## 2019-10-29 ENCOUNTER — TELEPHONE (OUTPATIENT)
Dept: PRIMARY CARE CLINIC | Facility: CLINIC | Age: 69
End: 2019-10-29

## 2019-10-29 NOTE — PROGRESS NOTES
Behavioral Health Community Health Worker  Initial Assessment  Completed by:  Maurice Aleman    Date:  10/29/2019    Patient Enrollment in Behavioral Health Program:  · Patient verbalized understanding of Behavioral Health Integration services to include:  · Patient understands that CHW, LCSW, PharmD and consulting Psychiatrist are members of the care team working collaboratively with his/her primary care provider: Yes  · Patient understands that activation of their [facility] patient portal account is required for accessing the full scope of team services: Yes  · Patient understands that some counseling sessions may occur via video: Yes  · Patient understands that services will be provided by the CHW, LCSW, and PharmD at no charge for a LIMITED TIME: Yes  · Clinic visits with the psychiatrist may be subject to a co-pay based on your insurance: Yes  · Patient consents to enroll in BHI program: Yes    Assessments     Single Item Health Literacy Scale:  · How often do you need to have someone help you read instructions, pamphlets or other written material from your doctor or pharmacy?: Never    Promis 10:  · Promis 10 Responses  · In general, would you say your health is: Fair  · In general, would you say your quality of life is: Fair  · In general, how would you rate your physical health?: Poor  · In general, how would you rate your mental health, including your mood and your ability to think?: Good  · In general, how would you rate your satisfaction with your social activities and relationships?: Poor  · In general, please rate how well you carry out your usual social activities and roles. (This includes activities at home, at work and in your community, and responsibilities as a parent, child, spouse, employee, friend, etc.): Fair  · To what extent are you able to carry out your everyday physical activities such as walking, climbing stairs, carrying groceries, or moving a chair? : A little  · In the past 7  days, how often have you been bothered by emotional problems such as feeling anxious, depressed or irritable?: Often  · In the past 7 days, how would you rate your fatigue on average?: Moderate  · In the past 7 days, on a scale of 0 to 10 (where 0 is no pain and 10 is the worst pain imaginable) how would you rate your pain on average?: pain score 7-9  · Global Physical Health: 8  · Global Mental health Score: 8    Depression PHQ:  PHQ9 10/29/2019   Total Score 9         Generalized Anxiety Disorder 7-Item Scale:  GAD7 10/29/2019   MARY-7 Score 6       History     Social History     Socioeconomic History    Marital status: Single     Spouse name: Not on file    Number of children: 2    Years of education: Not on file    Highest education level: Not on file   Occupational History    Occupation: unemployed   Social Needs    Financial resource strain: Somewhat hard    Food insecurity:     Worry: Sometimes true     Inability: Sometimes true    Transportation needs:     Medical: Yes     Non-medical: Yes   Tobacco Use    Smoking status: Current Every Day Smoker     Packs/day: 1.50    Smokeless tobacco: Never Used   Substance and Sexual Activity    Alcohol use: Yes     Alcohol/week: 7.0 standard drinks     Types: 7 Shots of liquor per week     Frequency: 4 or more times a week     Drinks per session: 1 or 2     Binge frequency: Never     Comment: at least 1 cocktail a day    Drug use: No    Sexual activity: Not Currently     Birth control/protection: Abstinence   Lifestyle    Physical activity:     Days per week: 0 days     Minutes per session: 0 min    Stress: Only a little   Relationships    Social connections:     Talks on phone: More than three times a week     Gets together: Patient refused     Attends [employer]ism service: Not on file     Active member of club or organization: No     Attends meetings of clubs or organizations: Patient refused     Relationship status:    Other Topics Concern    Are  you pregnant or think you may be? No    Breast-feeding Not Asked    Patient feels they ought to cut down on drinking/drug use Not Asked    Patient annoyed by others criticizing their drinking/drug use Not Asked    Patient has felt bad or guilty about drinking/drug use Not Asked    Patient has had a drink/used drugs as an eye opener in the AM Not Asked   Social History Narrative    Originally from [place]    Living in [facility] since 1998    Living in [facility]       Call Summary     Pt self- referred to the [facility] program for chronic pain patients via [facility]. Pt returned call and was screened by  for program eligibility. Pt did not meet enrollment criteria-- PHQ9 scores: 9 MARY 7 scores: 6 are below the the score threshold of >=10.  Pt was informed that she did not qualify for the study but  may request re-screening again in 30 days.   Pt discussed transportation barriers including that some of the transporters will not take her from [place] to [place] and there are bag limits for passengers. The bag limit is a barrier to getting enough groceries for more than a week. She does say her PCP is well aware of her transportation issues.  Pt shared that she has to save up to pay for transportation that is not covered by her insurance and sometimes cancels doctor's appointments because of it. Meals on Wheels may be an option If she can get special assistance to have it delivered to her door. [place] so sometimes deliveries are burdensome (e.g. may take her a long time to meet person for deliveries and carry it back). Pt was informed that her concerns would be shared with her PCP.

## 2019-10-31 ENCOUNTER — CLINICAL SUPPORT (OUTPATIENT)
Dept: SMOKING CESSATION | Facility: CLINIC | Age: 69
End: 2019-10-31
Payer: COMMERCIAL

## 2019-10-31 DIAGNOSIS — F17.200 NICOTINE DEPENDENCE: Primary | ICD-10-CM

## 2019-10-31 PROCEDURE — 99407 BEHAV CHNG SMOKING > 10 MIN: CPT | Mod: S$GLB,,,

## 2019-10-31 PROCEDURE — 99407 PR TOBACCO USE CESSATION INTENSIVE >10 MINUTES: ICD-10-PCS | Mod: S$GLB,,,

## 2019-10-31 NOTE — PROGRESS NOTES
Called pt to f/u on her 3 and 6 month smoking cessation quit status. Pt stated she is still smoking, but is down to 1/2 ppp from 2 ppd. Patient still actively enrolled in program and is using Chantix and patches. Informed her of benefit period, phone follow ups, and contact information. Will complete 3 and 6 month smart form and will continue to follow up on quit #2 episode.

## 2019-11-13 ENCOUNTER — PATIENT OUTREACH (OUTPATIENT)
Dept: OTHER | Facility: OTHER | Age: 69
End: 2019-11-13

## 2019-11-18 ENCOUNTER — PATIENT MESSAGE (OUTPATIENT)
Dept: INTERNAL MEDICINE | Facility: CLINIC | Age: 69
End: 2019-11-18

## 2019-11-18 DIAGNOSIS — S32.030D COMPRESSION FRACTURE OF L3 VERTEBRA WITH ROUTINE HEALING: ICD-10-CM

## 2019-11-19 ENCOUNTER — HOSPITAL ENCOUNTER (OUTPATIENT)
Dept: CARDIOLOGY | Facility: OTHER | Age: 69
Discharge: HOME | End: 2019-11-19
Attending: STUDENT IN AN ORGANIZED HEALTH CARE EDUCATION/TRAINING PROGRAM
Payer: MEDICARE

## 2019-11-19 ENCOUNTER — CLINICAL SUPPORT (OUTPATIENT)
Dept: SMOKING CESSATION | Facility: CLINIC | Age: 69
End: 2019-11-19
Payer: COMMERCIAL

## 2019-11-19 ENCOUNTER — PATIENT MESSAGE (OUTPATIENT)
Dept: INTERNAL MEDICINE | Facility: CLINIC | Age: 69
End: 2019-11-19

## 2019-11-19 DIAGNOSIS — F17.210 LIGHT CIGARETTE SMOKER (1-9 CIGS/DAY): Primary | ICD-10-CM

## 2019-11-19 DIAGNOSIS — N18.30 CHRONIC KIDNEY DISEASE, STAGE III (MODERATE): ICD-10-CM

## 2019-11-19 DIAGNOSIS — S32.030D COMPRESSION FRACTURE OF L3 VERTEBRA WITH ROUTINE HEALING: ICD-10-CM

## 2019-11-19 PROCEDURE — 93306 TTE W/DOPPLER COMPLETE: CPT

## 2019-11-19 PROCEDURE — 93306 TTE W/DOPPLER COMPLETE: CPT | Mod: 26,,, | Performed by: INTERNAL MEDICINE

## 2019-11-19 PROCEDURE — 99402 PREV MED CNSL INDIV APPRX 30: CPT | Mod: S$GLB,,,

## 2019-11-19 PROCEDURE — 99402 PR PREVENT COUNSEL,INDIV,30 MIN: ICD-10-PCS | Mod: S$GLB,,,

## 2019-11-19 PROCEDURE — 93306 ECHO (CUPID ONLY): ICD-10-PCS | Mod: 26,,, | Performed by: INTERNAL MEDICINE

## 2019-11-19 PROCEDURE — 99999 PR PBB SHADOW E&M-EST. PATIENT-LVL II: ICD-10-PCS | Mod: PBBFAC,,,

## 2019-11-19 PROCEDURE — 99999 PR PBB SHADOW E&M-EST. PATIENT-LVL II: CPT | Mod: PBBFAC,,,

## 2019-11-19 RX ORDER — VARENICLINE TARTRATE 0.5 (11)-1
KIT ORAL
COMMUNITY
End: 2019-11-19 | Stop reason: SDUPTHER

## 2019-11-19 RX ORDER — HYDROCODONE BITARTRATE AND ACETAMINOPHEN 10; 325 MG/1; MG/1
1 TABLET ORAL EVERY 12 HOURS PRN
Qty: 60 TABLET | Refills: 0 | Status: SHIPPED | OUTPATIENT
Start: 2019-11-19 | End: 2019-12-17 | Stop reason: SDUPTHER

## 2019-11-19 RX ORDER — CLONIDINE HYDROCHLORIDE 0.1 MG/1
TABLET ORAL
COMMUNITY
End: 2020-01-31

## 2019-11-19 RX ORDER — IBUPROFEN 200 MG
1 TABLET ORAL DAILY
Qty: 14 PATCH | Refills: 0 | Status: SHIPPED | OUTPATIENT
Start: 2019-11-19 | End: 2020-08-04 | Stop reason: SDUPTHER

## 2019-11-19 RX ORDER — VARENICLINE TARTRATE 1 MG/1
1 TABLET, FILM COATED ORAL DAILY
Qty: 60 TABLET | Refills: 0 | Status: SHIPPED | OUTPATIENT
Start: 2019-11-19 | End: 2020-04-01

## 2019-11-19 RX ORDER — HYDROCODONE BITARTRATE AND ACETAMINOPHEN 10; 325 MG/1; MG/1
1 TABLET ORAL EVERY 12 HOURS PRN
Qty: 60 TABLET | Refills: 0 | Status: CANCELLED | OUTPATIENT
Start: 2019-11-19

## 2019-11-19 NOTE — Clinical Note
Patient reports discontinuing patch do to itching.  Patient reports being depressed over her birthday and was unsuccessful at quitting.  Patient continue to use 1 mg Chantix BID without any negative side effects at this time.  Patient continue to smoke between 0-10 cpd.  Patient reports being able to breath better during the 3 days she quit which serves as a motivation for her quit. I explained to patient limitation use of Chantix and importance of setting another quit date.   The patient denies any abnormal behavioral or mental changes at this time. The patient will continue with group therapy sessions and medication monitoring by CTTS. Prescribed medication management will be by physician

## 2019-11-19 NOTE — PROGRESS NOTES
Individual Follow-Up Form    11/19/2019    Quit Date:     Clinical Status of Patient: Outpatient    Length of Service: 60 minutes    Continuing Medication: yes  Chantix    Other Medications: none     Target Symptoms: Withdrawal and medication side effects. The following were  rated moderate (3) to severe (4) on TCRS:  · Moderate (3): itching/explained as possible side effects of nicotine patch  · Severe (4): none    Comments: Patient reports discontinuing patch do to itching.  Patient reports being depressed over her birthday and was unsuccessful at quitting.  Patient continue to use 1 mg Chantix BID without any negative side effects at this time.  Patient continue to smoke between 0-10 cpd.  Patient reports being able to breath better during the 3 days she quit which serves as a motivation for her quit. I explained to patient limitation use of Chantix and importance of setting another quit date.   The patient denies any abnormal behavioral or mental changes at this time. The patient will continue with group therapy sessions and medication monitoring by CTTS. Prescribed medication management will be by physician.       Diagnosis: F17.210    Next Visit: 1 week

## 2019-11-20 LAB
ASCENDING AORTA: 2.72 CM
AV INDEX (PROSTH): 0.75
AV MEAN GRADIENT: 22 MMHG
AV PEAK GRADIENT: 35 MMHG
AV VALVE AREA: 2.44 CM2
AV VELOCITY RATIO: 0.73
CV ECHO LV RWT: 0.62 CM
DOP CALC AO PEAK VEL: 2.96 M/S
DOP CALC AO VTI: 69.26 CM
DOP CALC LVOT AREA: 3.3 CM2
DOP CALC LVOT DIAMETER: 2.04 CM
DOP CALC LVOT PEAK VEL: 2.17 M/S
DOP CALC LVOT STROKE VOLUME: 168.83 CM3
DOP CALCLVOT PEAK VEL VTI: 51.68 CM
E WAVE DECELERATION TIME: 365.12 MSEC
E/A RATIO: 0.85
ECHO LV POSTERIOR WALL: 1.61 CM (ref 0.6–1.1)
FRACTIONAL SHORTENING: 26 % (ref 28–44)
INTERVENTRICULAR SEPTUM: 1.62 CM (ref 0.6–1.1)
IVRT: 0.07 MSEC
LA MAJOR: 5.83 CM
LA MINOR: 6.15 CM
LA WIDTH: 4.29 CM
LEFT ATRIUM SIZE: 4.44 CM
LEFT ATRIUM VOLUME: 96.91 CM3
LEFT INTERNAL DIMENSION IN SYSTOLE: 3.85 CM (ref 2.1–4)
LEFT VENTRICLE DIASTOLIC VOLUME: 128.58 ML
LEFT VENTRICLE SYSTOLIC VOLUME: 63.95 ML
LEFT VENTRICULAR INTERNAL DIMENSION IN DIASTOLE: 5.18 CM (ref 3.5–6)
LEFT VENTRICULAR MASS: 379.85 G
LV SEPTAL E/E' RATIO: 19.71 M/S
MV PEAK A VEL: 1.63 M/S
MV PEAK E VEL: 1.38 M/S
MV STENOSIS PRESSURE HALF TIME: 106 MS
MV VALVE AREA P 1/2 METHOD: 2.08 CM2
PULM VEIN S/D RATIO: 1.44
PV PEAK D VEL: 0.45 M/S
PV PEAK S VEL: 0.65 M/S
PV PEAK VELOCITY: 1.38 CM/S
RA MAJOR: 4.61 CM
RA WIDTH: 2.95 CM
RIGHT VENTRICULAR END-DIASTOLIC DIMENSION: 3.31 CM
SINUS: 3.14 CM
STJ: 2.38 CM
TDI SEPTAL: 0.07 M/S
TRICUSPID ANNULAR PLANE SYSTOLIC EXCURSION: 1.55 CM

## 2019-11-25 ENCOUNTER — PATIENT OUTREACH (OUTPATIENT)
Dept: OTHER | Facility: OTHER | Age: 69
End: 2019-11-25

## 2019-11-26 ENCOUNTER — OFFICE VISIT (OUTPATIENT)
Dept: INTERNAL MEDICINE | Facility: CLINIC | Age: 69
End: 2019-11-26
Attending: INTERNAL MEDICINE
Payer: MEDICARE

## 2019-11-26 VITALS
HEIGHT: 66 IN | SYSTOLIC BLOOD PRESSURE: 145 MMHG | WEIGHT: 229.5 LBS | BODY MASS INDEX: 36.88 KG/M2 | HEART RATE: 103 BPM | DIASTOLIC BLOOD PRESSURE: 68 MMHG | OXYGEN SATURATION: 97 %

## 2019-11-26 DIAGNOSIS — E55.9 VITAMIN D DEFICIENCY: ICD-10-CM

## 2019-11-26 DIAGNOSIS — D64.9 ANEMIA, UNSPECIFIED TYPE: ICD-10-CM

## 2019-11-26 DIAGNOSIS — F11.20 UNCOMPLICATED OPIOID DEPENDENCE: ICD-10-CM

## 2019-11-26 DIAGNOSIS — E78.5 HYPERLIPIDEMIA, UNSPECIFIED HYPERLIPIDEMIA TYPE: ICD-10-CM

## 2019-11-26 DIAGNOSIS — I50.30 HEART FAILURE WITH PRESERVED EJECTION FRACTION, BORDERLINE, CLASS III: ICD-10-CM

## 2019-11-26 DIAGNOSIS — J44.9 CHRONIC OBSTRUCTIVE PULMONARY DISEASE, UNSPECIFIED COPD TYPE: ICD-10-CM

## 2019-11-26 DIAGNOSIS — F17.200 TOBACCO DEPENDENCE: ICD-10-CM

## 2019-11-26 DIAGNOSIS — E53.8 B12 DEFICIENCY: ICD-10-CM

## 2019-11-26 DIAGNOSIS — I50.32 CHRONIC HEART FAILURE WITH PRESERVED EJECTION FRACTION: ICD-10-CM

## 2019-11-26 DIAGNOSIS — E66.01 SEVERE OBESITY (BMI 35.0-39.9) WITH COMORBIDITY: ICD-10-CM

## 2019-11-26 DIAGNOSIS — E04.1 THYROID NODULE: ICD-10-CM

## 2019-11-26 DIAGNOSIS — R91.1 PULMONARY NODULE: ICD-10-CM

## 2019-11-26 DIAGNOSIS — I10 HYPERTENSION, BENIGN: Primary | ICD-10-CM

## 2019-11-26 PROCEDURE — 3077F SYST BP >= 140 MM HG: CPT | Mod: CPTII,S$GLB,, | Performed by: INTERNAL MEDICINE

## 2019-11-26 PROCEDURE — 99214 PR OFFICE/OUTPT VISIT, EST, LEVL IV, 30-39 MIN: ICD-10-PCS | Mod: S$GLB,,, | Performed by: INTERNAL MEDICINE

## 2019-11-26 PROCEDURE — 99499 UNLISTED E&M SERVICE: CPT | Mod: S$GLB,,, | Performed by: INTERNAL MEDICINE

## 2019-11-26 PROCEDURE — 1159F PR MEDICATION LIST DOCUMENTED IN MEDICAL RECORD: ICD-10-PCS | Mod: S$GLB,,, | Performed by: INTERNAL MEDICINE

## 2019-11-26 PROCEDURE — 1101F PR PT FALLS ASSESS DOC 0-1 FALLS W/OUT INJ PAST YR: ICD-10-PCS | Mod: CPTII,S$GLB,, | Performed by: INTERNAL MEDICINE

## 2019-11-26 PROCEDURE — 99499 RISK ADDL DX/OHS AUDIT: ICD-10-PCS | Mod: S$GLB,,, | Performed by: INTERNAL MEDICINE

## 2019-11-26 PROCEDURE — 1126F PR PAIN SEVERITY QUANTIFIED, NO PAIN PRESENT: ICD-10-PCS | Mod: S$GLB,,, | Performed by: INTERNAL MEDICINE

## 2019-11-26 PROCEDURE — 3077F PR MOST RECENT SYSTOLIC BLOOD PRESSURE >= 140 MM HG: ICD-10-PCS | Mod: CPTII,S$GLB,, | Performed by: INTERNAL MEDICINE

## 2019-11-26 PROCEDURE — 1101F PT FALLS ASSESS-DOCD LE1/YR: CPT | Mod: CPTII,S$GLB,, | Performed by: INTERNAL MEDICINE

## 2019-11-26 PROCEDURE — 3078F PR MOST RECENT DIASTOLIC BLOOD PRESSURE < 80 MM HG: ICD-10-PCS | Mod: CPTII,S$GLB,, | Performed by: INTERNAL MEDICINE

## 2019-11-26 PROCEDURE — 1159F MED LIST DOCD IN RCRD: CPT | Mod: S$GLB,,, | Performed by: INTERNAL MEDICINE

## 2019-11-26 PROCEDURE — 3078F DIAST BP <80 MM HG: CPT | Mod: CPTII,S$GLB,, | Performed by: INTERNAL MEDICINE

## 2019-11-26 PROCEDURE — 99999 PR PBB SHADOW E&M-EST. PATIENT-LVL III: ICD-10-PCS | Mod: PBBFAC,,, | Performed by: INTERNAL MEDICINE

## 2019-11-26 PROCEDURE — 99999 PR PBB SHADOW E&M-EST. PATIENT-LVL III: CPT | Mod: PBBFAC,,, | Performed by: INTERNAL MEDICINE

## 2019-11-26 PROCEDURE — 99214 OFFICE O/P EST MOD 30 MIN: CPT | Mod: S$GLB,,, | Performed by: INTERNAL MEDICINE

## 2019-11-26 PROCEDURE — 1126F AMNT PAIN NOTED NONE PRSNT: CPT | Mod: S$GLB,,, | Performed by: INTERNAL MEDICINE

## 2019-11-26 RX ORDER — LOSARTAN POTASSIUM 100 MG/1
100 TABLET ORAL DAILY
Qty: 90 TABLET | Refills: 3 | Status: SHIPPED | OUTPATIENT
Start: 2019-11-26 | End: 2020-12-03 | Stop reason: SDUPTHER

## 2019-11-26 NOTE — PROGRESS NOTES
Subjective:   Patient ID: aNlini Varma is a 69 y.o. female  Chief complaint:   Chief Complaint   Patient presents with    Annual Exam       HPI   Here for 3 month f/u - 8/20/2019    Pt with hx of HFpEF (had LHC and RHC 2018), chronic hyponatremia, anasarca, HTN, tobacco dep, COPD on 2L home O2, osteoporosis with L3 compression fracture on alendronate and then switched to prolia due to changes in renal function, hx of LESLY 2/2 GIB due to NSAID use, hypoMag, hyponatremia suspected 2/2 SIADH in past,  thyroid nodule: s/p FNA 5/2017 and benign, hx of pulm nodules with repeat CT 3/20/2019 with stable opacities that do not require additional f/u, OA of shoulders and knees with L knee > Right after surgery followed by ortho outside of [facility] - sx currently managed with norco bid     Chronic pain 2/2 chronic OA of left knee:   - UTD urine tox screen   - nelson norco bid   - fill rx at same pharmacy   - no early refill requests   - followed by ortho at [facility]    reviewed and consistent      Hyponatremia:   Seen by renal 8/15/19 and has appt 12/19/19 -Na stable      Tob:   -enrolled in tob cess counseling   utd on ct chest screen 3/20/2019  - quit tob 1 week ago!     Diastolic heart failure and HTN, anasarca:   Taking torsemide 20mg once daily (will take additional dose prn weight gain), losartan 50, amlodipine 10    - weight stable when compared to weights from our scale at v with me - she reports appetite increased and has gained some weight after lost some in bt appts with me   - following water restriction - 1 liter      Thyroid nodule: s/p bx 2017 - repeat thyroid us 9/2019 with stable nodule     HLD:  nelson atorvastatin 40 - flp utd 11/2019     COPD on 2L oxygen : stable - nelson inhalers   utd on ct chest as above       Osteoporosis improved to penia - dexa - last was 9/10/2019  - switched to prolia in April 2019 due to fluctuating renal function   - last injection 10/2019     Obesity:   - has decreased portions  -  "monitoring fluids as above      Anemia:   - iron stable but h/h slightly decreased on recent labs   - no nsaids   - is taking asa 81   - no gross bleeding   - utd on cscope    Hypokalemia: improved on daily suppl and taking taking magnesium qod  Hypomagnesemia: taking mag supp qod as above     Also taking:   - Taking b12 qod  - taking mvi daily   - taking vit d and vit d qod  - taking calcium qod   - not taking oral iron as not tolerated     Review of Systems    Objective:  Vitals:    11/26/19 1045   BP: (!) 145/68   Pulse: 103   SpO2: 97%   Weight: 104.1 kg (229 lb 8 oz)   Height: 5' 6" (1.676 m)     Body mass index is 37.04 kg/m².    Physical Exam   Constitutional: She is oriented to person, place, and time. She appears well-developed and well-nourished. No distress.   NC in place   Talking in complete sentences    HENT:   Head: Normocephalic and atraumatic.   Eyes: Conjunctivae and EOM are normal.   Neck: Normal range of motion. Neck supple.   Cardiovascular: Normal rate, regular rhythm and intact distal pulses.   Pulmonary/Chest: Effort normal and breath sounds normal. No stridor. No respiratory distress. She has no wheezes. She has no rales. She exhibits no tenderness.   Abdominal: Soft. Normal appearance and bowel sounds are normal.   Musculoskeletal: She exhibits no edema or tenderness.   Ambulating with walker  +1 edema of BKE to knees    Neurological: She is alert and oriented to person, place, and time. She has normal strength. Gait normal.   Skin: Skin is warm, dry and intact. She is not diaphoretic. No cyanosis. Nails show no clubbing.   Psychiatric: She has a normal mood and affect. Her speech is normal and behavior is normal. Cognition and memory are normal.   Vitals reviewed.      Assessment:  1. Hypertension, benign    2. Tobacco dependence    3. Uncomplicated opioid dependence    4. Vitamin D deficiency    5. Thyroid nodule s/p biopsy 2017 - benign    6. Severe obesity (BMI 35.0-39.9) with " comorbidity    7. Pulmonary nodule    8. Hyperlipidemia, unspecified hyperlipidemia type    9. Heart failure with preserved ejection fraction, borderline, class III    10. Chronic heart failure with preserved ejection fraction    11. Chronic obstructive pulmonary disease, unspecified COPD type    12. Anemia, unspecified type    13. B12 deficiency        Plan:  Nalini was seen today for annual exam.    Diagnoses and all orders for this visit:    Hypertension, benign  -     Basic metabolic panel; Future    Tobacco dependence    Uncomplicated opioid dependence    Vitamin D deficiency    Thyroid nodule s/p biopsy 2017 - benign    Severe obesity (BMI 35.0-39.9) with comorbidity    Pulmonary nodule    Hyperlipidemia, unspecified hyperlipidemia type    Heart failure with preserved ejection fraction, borderline, class III    Chronic heart failure with preserved ejection fraction    Chronic obstructive pulmonary disease, unspecified COPD type    Anemia, unspecified type  -     CBC auto differential; Future  -     Lactate dehydrogenase; Future  -     Hepatic function panel; Future  -     Pathologist Interpretation Differential; Future  -     HAPTOGLOBIN; Future    B12 deficiency    Other orders  -     losartan (COZAAR) 100 MG tablet; Take 1 tablet (100 mg total) by mouth once daily.    Inc losartan - check bmp in 2 weeks  Anemia - check additional labs   Cont meds and supplements  Keep appt with specialists  Cont dig htn prog and tob cess prog  utd on ct chest and thyroid     Health Maintenance   Topic Date Due    Colonoscopy  05/22/2020    Mammogram  02/20/2021    DEXA SCAN  09/10/2022    Lipid Panel  11/19/2024    TETANUS VACCINE  01/29/2025    Hepatitis C Screening  Completed    Pneumococcal Vaccine (65+ High/Highest Risk)  Completed

## 2019-12-01 DIAGNOSIS — E78.5 HYPERLIPIDEMIA, UNSPECIFIED HYPERLIPIDEMIA TYPE: ICD-10-CM

## 2019-12-02 RX ORDER — ATORVASTATIN CALCIUM 40 MG/1
TABLET, FILM COATED ORAL
Qty: 90 TABLET | Refills: 3 | Status: SHIPPED | OUTPATIENT
Start: 2019-12-02 | End: 2020-12-03 | Stop reason: SDUPTHER

## 2019-12-06 DIAGNOSIS — J42 CHRONIC BRONCHITIS, UNSPECIFIED CHRONIC BRONCHITIS TYPE: ICD-10-CM

## 2019-12-06 RX ORDER — FLUTICASONE PROPIONATE AND SALMETEROL XINAFOATE 115; 21 UG/1; UG/1
2 AEROSOL, METERED RESPIRATORY (INHALATION) EVERY 12 HOURS
Qty: 36 G | Refills: 3 | Status: SHIPPED | OUTPATIENT
Start: 2019-12-06 | End: 2021-01-05 | Stop reason: SDUPTHER

## 2019-12-10 ENCOUNTER — PATIENT MESSAGE (OUTPATIENT)
Dept: INTERNAL MEDICINE | Facility: CLINIC | Age: 69
End: 2019-12-10

## 2019-12-11 ENCOUNTER — PATIENT MESSAGE (OUTPATIENT)
Dept: INTERNAL MEDICINE | Facility: CLINIC | Age: 69
End: 2019-12-11

## 2019-12-11 ENCOUNTER — TELEPHONE (OUTPATIENT)
Dept: NEPHROLOGY | Facility: CLINIC | Age: 69
End: 2019-12-11

## 2019-12-11 NOTE — TELEPHONE ENCOUNTER
----- Message from Kiana Turcios sent at 12/11/2019  3:25 PM CST -----  Contact:   Pt  [phone]  Nalini Varma called returning the nurse phone call     I didn't call pt

## 2019-12-12 ENCOUNTER — PATIENT MESSAGE (OUTPATIENT)
Dept: INTERNAL MEDICINE | Facility: CLINIC | Age: 69
End: 2019-12-12

## 2019-12-12 NOTE — PROGRESS NOTES
Brief follow up. Patient on her way out the door for bloodwork.     Patient states that she is currently having phone problems. She states that she knows she needs a new phone, but cannot afford one at the moment. We agreed to follow up in a couple weeks on this.

## 2019-12-15 ENCOUNTER — PATIENT MESSAGE (OUTPATIENT)
Dept: INTERNAL MEDICINE | Facility: CLINIC | Age: 69
End: 2019-12-15

## 2019-12-16 DIAGNOSIS — E87.1 HYPONATREMIA: Primary | ICD-10-CM

## 2019-12-17 DIAGNOSIS — S32.030D COMPRESSION FRACTURE OF L3 VERTEBRA WITH ROUTINE HEALING: ICD-10-CM

## 2019-12-17 RX ORDER — HYDROCODONE BITARTRATE AND ACETAMINOPHEN 10; 325 MG/1; MG/1
1 TABLET ORAL EVERY 12 HOURS PRN
Qty: 60 TABLET | Refills: 0 | Status: SHIPPED | OUTPATIENT
Start: 2019-12-19 | End: 2020-01-26

## 2019-12-19 ENCOUNTER — PATIENT MESSAGE (OUTPATIENT)
Dept: INTERNAL MEDICINE | Facility: CLINIC | Age: 69
End: 2019-12-19

## 2019-12-19 ENCOUNTER — TELEPHONE (OUTPATIENT)
Dept: NEPHROLOGY | Facility: CLINIC | Age: 69
End: 2019-12-19

## 2019-12-19 NOTE — TELEPHONE ENCOUNTER
----- Message from Sofya Parks sent at 12/19/2019 11:14 AM CST -----  Contact: self [phone]  Pt states can she get another apt states she is very sick states please call back to discuss     Spoke to pt and scheduled

## 2019-12-27 ENCOUNTER — LAB VISIT (OUTPATIENT)
Dept: LAB | Facility: OTHER | Age: 69
End: 2019-12-27
Attending: STUDENT IN AN ORGANIZED HEALTH CARE EDUCATION/TRAINING PROGRAM
Payer: MEDICARE

## 2019-12-27 DIAGNOSIS — E87.1 HYPONATREMIA: ICD-10-CM

## 2019-12-27 LAB
BILIRUB UR QL STRIP: NEGATIVE
CLARITY UR: CLEAR
COLOR UR: YELLOW
CREAT UR-MCNC: 54.7 MG/DL (ref 15–325)
GLUCOSE UR QL STRIP: NEGATIVE
HGB UR QL STRIP: NEGATIVE
KETONES UR QL STRIP: NEGATIVE
LEUKOCYTE ESTERASE UR QL STRIP: NEGATIVE
NITRITE UR QL STRIP: NEGATIVE
PH UR STRIP: 6 [PH] (ref 5–8)
PROT UR QL STRIP: NEGATIVE
PROT UR-MCNC: 12 MG/DL (ref 0–15)
PROT/CREAT UR: 0.22 MG/G{CREAT} (ref 0–0.2)
SP GR UR STRIP: <=1.005 (ref 1–1.03)
URN SPEC COLLECT METH UR: ABNORMAL
UROBILINOGEN UR STRIP-ACNC: NEGATIVE EU/DL

## 2019-12-27 PROCEDURE — 81003 URINALYSIS AUTO W/O SCOPE: CPT

## 2019-12-27 PROCEDURE — 82570 ASSAY OF URINE CREATININE: CPT

## 2020-01-03 ENCOUNTER — TELEPHONE (OUTPATIENT)
Dept: INTERNAL MEDICINE | Facility: CLINIC | Age: 70
End: 2020-01-03

## 2020-01-03 ENCOUNTER — PATIENT MESSAGE (OUTPATIENT)
Dept: INTERNAL MEDICINE | Facility: CLINIC | Age: 70
End: 2020-01-03

## 2020-01-03 DIAGNOSIS — N17.9 AKI (ACUTE KIDNEY INJURY): Primary | ICD-10-CM

## 2020-01-03 NOTE — TELEPHONE ENCOUNTER
Message sent to pt via my chart with lab results and updates to plan.     Please arrange BMP in 1 week - NONfasting

## 2020-01-06 ENCOUNTER — PATIENT OUTREACH (OUTPATIENT)
Dept: OTHER | Facility: OTHER | Age: 70
End: 2020-01-06

## 2020-01-06 ENCOUNTER — PATIENT MESSAGE (OUTPATIENT)
Dept: ADMINISTRATIVE | Facility: OTHER | Age: 70
End: 2020-01-06

## 2020-01-06 NOTE — PROGRESS NOTES
Digital Medicine: Health  Follow-Up        Follow Up  Follow-up reason(s): reading review      Readings are missing.   tech support needed.Patient will be getting a new phone tomorrow. She has an appt at main campus, but notes that it is too far to walk across the street to the primary care center [facility]. Patient will try to download the [employer] and [employer] apps, and will request tech support over the phone if she isn't successful. Will place task if no readings come through.     If tech support over the phone doesn't work, patient has an appt at [facility] in February in which she will go to the obar.           PLAN  additional monitoring needed      There are no preventive care reminders to display for this patient.    Last 5 Patient Entered Readings                                      Current 30 Day Average:      Recent Readings 11/23/2019 11/23/2019 11/23/2019 10/8/2019 9/30/2019    SBP (mmHg) 168 153 175 149 138    DBP (mmHg) 97 120 130 84 89    Pulse 99 101 101 100 91                  Screenings    SDOH

## 2020-01-09 ENCOUNTER — TELEPHONE (OUTPATIENT)
Dept: INTERNAL MEDICINE | Facility: CLINIC | Age: 70
End: 2020-01-09

## 2020-01-09 ENCOUNTER — OFFICE VISIT (OUTPATIENT)
Dept: NEPHROLOGY | Facility: CLINIC | Age: 70
End: 2020-01-09
Payer: MEDICARE

## 2020-01-09 ENCOUNTER — LAB VISIT (OUTPATIENT)
Dept: LAB | Facility: HOSPITAL | Age: 70
End: 2020-01-09
Attending: INTERNAL MEDICINE
Payer: MEDICARE

## 2020-01-09 VITALS
SYSTOLIC BLOOD PRESSURE: 140 MMHG | HEART RATE: 100 BPM | DIASTOLIC BLOOD PRESSURE: 70 MMHG | OXYGEN SATURATION: 98 % | BODY MASS INDEX: 38.07 KG/M2 | WEIGHT: 235.88 LBS

## 2020-01-09 DIAGNOSIS — D64.9 ANEMIA, UNSPECIFIED TYPE: ICD-10-CM

## 2020-01-09 DIAGNOSIS — N17.9 AKI (ACUTE KIDNEY INJURY): ICD-10-CM

## 2020-01-09 DIAGNOSIS — D50.9 IRON DEFICIENCY ANEMIA, UNSPECIFIED IRON DEFICIENCY ANEMIA TYPE: Primary | ICD-10-CM

## 2020-01-09 DIAGNOSIS — N18.30 CHRONIC KIDNEY DISEASE, STAGE III (MODERATE): ICD-10-CM

## 2020-01-09 DIAGNOSIS — D64.9 ANEMIA, UNSPECIFIED TYPE: Primary | ICD-10-CM

## 2020-01-09 PROBLEM — E87.6 HYPOKALEMIA: Status: RESOLVED | Noted: 2018-09-26 | Resolved: 2020-01-09

## 2020-01-09 LAB
ANION GAP SERPL CALC-SCNC: 14 MMOL/L (ref 8–16)
BASOPHILS # BLD AUTO: 0.05 K/UL (ref 0–0.2)
BASOPHILS NFR BLD: 0.3 % (ref 0–1.9)
BUN SERPL-MCNC: 18 MG/DL (ref 8–23)
CALCIUM SERPL-MCNC: 11.2 MG/DL (ref 8.7–10.5)
CHLORIDE SERPL-SCNC: 98 MMOL/L (ref 95–110)
CO2 SERPL-SCNC: 26 MMOL/L (ref 23–29)
CREAT SERPL-MCNC: 1.3 MG/DL (ref 0.5–1.4)
DIFFERENTIAL METHOD: ABNORMAL
EOSINOPHIL # BLD AUTO: 0.1 K/UL (ref 0–0.5)
EOSINOPHIL NFR BLD: 1 % (ref 0–8)
ERYTHROCYTE [DISTWIDTH] IN BLOOD BY AUTOMATED COUNT: 13.7 % (ref 11.5–14.5)
EST. GFR  (AFRICAN AMERICAN): 48 ML/MIN/1.73 M^2
EST. GFR  (NON AFRICAN AMERICAN): 41.7 ML/MIN/1.73 M^2
GLUCOSE SERPL-MCNC: 98 MG/DL (ref 70–110)
HCT VFR BLD AUTO: 34.5 % (ref 37–48.5)
HGB BLD-MCNC: 10.8 G/DL (ref 12–16)
IMM GRANULOCYTES # BLD AUTO: 0.07 K/UL (ref 0–0.04)
IMM GRANULOCYTES NFR BLD AUTO: 0.5 % (ref 0–0.5)
LYMPHOCYTES # BLD AUTO: 2.4 K/UL (ref 1–4.8)
LYMPHOCYTES NFR BLD: 16.6 % (ref 18–48)
MCH RBC QN AUTO: 29.7 PG (ref 27–31)
MCHC RBC AUTO-ENTMCNC: 31.3 G/DL (ref 32–36)
MCV RBC AUTO: 95 FL (ref 82–98)
MONOCYTES # BLD AUTO: 1.5 K/UL (ref 0.3–1)
MONOCYTES NFR BLD: 10.2 % (ref 4–15)
NEUTROPHILS # BLD AUTO: 10.5 K/UL (ref 1.8–7.7)
NEUTROPHILS NFR BLD: 71.4 % (ref 38–73)
NRBC BLD-RTO: 0 /100 WBC
PATH REV BLD -IMP: NORMAL
PLATELET # BLD AUTO: 323 K/UL (ref 150–350)
PMV BLD AUTO: 10.1 FL (ref 9.2–12.9)
POTASSIUM SERPL-SCNC: 4.5 MMOL/L (ref 3.5–5.1)
RBC # BLD AUTO: 3.64 M/UL (ref 4–5.4)
SODIUM SERPL-SCNC: 138 MMOL/L (ref 136–145)
WBC # BLD AUTO: 14.73 K/UL (ref 3.9–12.7)

## 2020-01-09 PROCEDURE — 36415 COLL VENOUS BLD VENIPUNCTURE: CPT

## 2020-01-09 PROCEDURE — 99214 PR OFFICE/OUTPT VISIT, EST, LEVL IV, 30-39 MIN: ICD-10-PCS | Mod: GC,S$GLB,, | Performed by: STUDENT IN AN ORGANIZED HEALTH CARE EDUCATION/TRAINING PROGRAM

## 2020-01-09 PROCEDURE — 1101F PT FALLS ASSESS-DOCD LE1/YR: CPT | Mod: CPTII,GC,S$GLB, | Performed by: STUDENT IN AN ORGANIZED HEALTH CARE EDUCATION/TRAINING PROGRAM

## 2020-01-09 PROCEDURE — 1159F MED LIST DOCD IN RCRD: CPT | Mod: GC,S$GLB,, | Performed by: STUDENT IN AN ORGANIZED HEALTH CARE EDUCATION/TRAINING PROGRAM

## 2020-01-09 PROCEDURE — 85025 COMPLETE CBC W/AUTO DIFF WBC: CPT

## 2020-01-09 PROCEDURE — 99999 PR PBB SHADOW E&M-EST. PATIENT-LVL V: CPT | Mod: PBBFAC,GC,, | Performed by: STUDENT IN AN ORGANIZED HEALTH CARE EDUCATION/TRAINING PROGRAM

## 2020-01-09 PROCEDURE — 1159F PR MEDICATION LIST DOCUMENTED IN MEDICAL RECORD: ICD-10-PCS | Mod: GC,S$GLB,, | Performed by: STUDENT IN AN ORGANIZED HEALTH CARE EDUCATION/TRAINING PROGRAM

## 2020-01-09 PROCEDURE — 80048 BASIC METABOLIC PNL TOTAL CA: CPT

## 2020-01-09 PROCEDURE — 3078F PR MOST RECENT DIASTOLIC BLOOD PRESSURE < 80 MM HG: ICD-10-PCS | Mod: CPTII,GC,S$GLB, | Performed by: STUDENT IN AN ORGANIZED HEALTH CARE EDUCATION/TRAINING PROGRAM

## 2020-01-09 PROCEDURE — 1125F PR PAIN SEVERITY QUANTIFIED, PAIN PRESENT: ICD-10-PCS | Mod: GC,S$GLB,, | Performed by: STUDENT IN AN ORGANIZED HEALTH CARE EDUCATION/TRAINING PROGRAM

## 2020-01-09 PROCEDURE — 85060 PATHOLOGIST REVIEW: ICD-10-PCS | Mod: ,,, | Performed by: PATHOLOGY

## 2020-01-09 PROCEDURE — 1101F PR PT FALLS ASSESS DOC 0-1 FALLS W/OUT INJ PAST YR: ICD-10-PCS | Mod: CPTII,GC,S$GLB, | Performed by: STUDENT IN AN ORGANIZED HEALTH CARE EDUCATION/TRAINING PROGRAM

## 2020-01-09 PROCEDURE — 3078F DIAST BP <80 MM HG: CPT | Mod: CPTII,GC,S$GLB, | Performed by: STUDENT IN AN ORGANIZED HEALTH CARE EDUCATION/TRAINING PROGRAM

## 2020-01-09 PROCEDURE — 99214 OFFICE O/P EST MOD 30 MIN: CPT | Mod: GC,S$GLB,, | Performed by: STUDENT IN AN ORGANIZED HEALTH CARE EDUCATION/TRAINING PROGRAM

## 2020-01-09 PROCEDURE — 3077F SYST BP >= 140 MM HG: CPT | Mod: CPTII,GC,S$GLB, | Performed by: STUDENT IN AN ORGANIZED HEALTH CARE EDUCATION/TRAINING PROGRAM

## 2020-01-09 PROCEDURE — 1125F AMNT PAIN NOTED PAIN PRSNT: CPT | Mod: GC,S$GLB,, | Performed by: STUDENT IN AN ORGANIZED HEALTH CARE EDUCATION/TRAINING PROGRAM

## 2020-01-09 PROCEDURE — 99999 PR PBB SHADOW E&M-EST. PATIENT-LVL V: ICD-10-PCS | Mod: PBBFAC,GC,, | Performed by: STUDENT IN AN ORGANIZED HEALTH CARE EDUCATION/TRAINING PROGRAM

## 2020-01-09 PROCEDURE — 3077F PR MOST RECENT SYSTOLIC BLOOD PRESSURE >= 140 MM HG: ICD-10-PCS | Mod: CPTII,GC,S$GLB, | Performed by: STUDENT IN AN ORGANIZED HEALTH CARE EDUCATION/TRAINING PROGRAM

## 2020-01-09 PROCEDURE — 85060 BLOOD SMEAR INTERPRETATION: CPT | Mod: ,,, | Performed by: PATHOLOGY

## 2020-01-09 NOTE — PATIENT INSTRUCTIONS
- Continue fluid restriction 1-1.5L daily  - Do lab workup ordered when the next laboratories are due for your PCP  - Return to clinic in 1 year

## 2020-01-09 NOTE — TELEPHONE ENCOUNTER
Spoke w/ kenroy @ [facility] main lab and she stated that she see the lab but she is not sure why its not showing.   kenroy asked for direct number to have supervisor look it issue and will call me (Shruthi) back direct .

## 2020-01-09 NOTE — PROGRESS NOTES
"NEPHROLOGY PROGRESS NOTE    Subjective:       Patient ID: Nalini Varma is a 69 y.o. White female who presents for follow up evaluation of hyponatremia. .     From Dr. Gee:  "The patient has a history HTN x 2-3 yrs. She was recently admitted to [facility] with dizziness and found to have acute on chronic to hyponatremia. Her hyponatremia is evident since 12/16  But also had borderline low sodium since 2/16 (135 meq/l). In December of 2016 she was on HCTZ and Zoloft but she is not taking it anymore. Reports adhering to fluid restriction around 1 liter at this time. Last serum Na down was 130 at discharge from [facility]. Unfortunately the patient is homeless and lives in a shelter at this time (20 month).  The patient does not frequently use NSAIDS or herbal supplements. Livelong smoker (40 yrs)."    HPI:   A 69 y/o female with HTN, COPD oxygen dependent(2lt), HFpEF, and Chronic smoker >50 yrs, arrives for follow up, reports MEDELLIN, uses a walker .Drinking ~1 Lt water dialy. Still reporting lower extrenty edema. Hospitalized on December 2018 at [facility] due to hyponatremia. The patient denies , chest pain, palpitations, dysuria, problems voiding, N/V/D, taking NSAIDs or new antibiotics, recreational drugs, recent episode of dehydration, acute illness, hospitalization or exposure to IV radiocontrast.    Interval hx:  - 1/09/2020: pt w/o any complaints today. Refers off and on lower extremity edema. Recently being more complaint with a low sodium diet which she refers has improved her lower extremity.     Review of Systems   Constitutional: Negative for activity change, appetite change, chills, fatigue, fever and unexpected weight change.   HENT: Negative.  Negative for nosebleeds.    Eyes: Negative.    Respiratory: Positive for shortness of breath (stable with walking). Negative for cough and chest tightness.    Cardiovascular: Negative.  Negative for chest pain and leg swelling.   Gastrointestinal: Negative for " abdominal pain, anal bleeding, diarrhea, nausea and vomiting.   Genitourinary: Negative for decreased urine volume, difficulty urinating, dysuria, flank pain, frequency, hematuria, pelvic pain, urgency and vaginal bleeding.   Musculoskeletal: Negative.  Negative for joint swelling and myalgias.   Skin: Negative.  Negative for rash.   Neurological: Negative.    Psychiatric/Behavioral: Negative.    All other systems reviewed and are negative.          Past Medical History:   Diagnosis Date    Allergy     Anemia due to gastrointestinal blood loss     LESLY from gastric ulcer due to chronic nsaid use    Anxiety     Arthritis     CKD (chronic kidney disease) stage 3, GFR 30-59 ml/min     followed by [facility] - recieved labs from 2017 and 2/2018    Gastric ulcer     H/O knee surgery 2001    right    Heart failure with preserved ejection fraction 8/20/2018    Hx of psychiatric care     Hyperlipidemia     Hyponatremia     Psychiatric problem     Therapy        Family History   Problem Relation Age of Onset    Hypertension Mother     Alzheimer's disease Mother     Dementia Mother     Depression Mother     Myasthenia gravis Father     Arthritis Father     Rectal cancer Father     Hypertension Brother     Arthritis Brother     Colon cancer Brother     No Known Problems Son     Cancer Sister         brain    Melanoma Neg Hx     Breast cancer Neg Hx        Past Surgical History:   Procedure Laterality Date    BREAST CYST EXCISION Right     1967    cataract surgery      COLONOSCOPY  2015    CORONARY ANGIOGRAPHY N/A 7/20/2018    Procedure: ANGIOGRAM, CORONARY ARTERY;  Surgeon: Willard Roberts MD;  Location: [facility] CATH LAB;  Service: Cardiovascular;  Laterality: N/A;    ESOPHAGOGASTRODUODENOSCOPY  2015    FRACTURE SURGERY      left femur    JOINT REPLACEMENT  2007    left knee x 2, 2nd performed 2/2 to MRSA infection 3 months after 1st surgery    ORIF FEMUR FRACTURE Left     TUBAL  LIGATION           Current Outpatient Medications:     albuterol (VENTOLIN HFA) 90 mcg/actuation inhaler, inhale 1-2 puffs as needed every 6 hours for wheezing and shortness of breath, Disp: 18 g, Rfl: 11    amLODIPine (NORVASC) 10 MG tablet, Take 1 tablet (10 mg total) by mouth once daily., Disp: 90 tablet, Rfl: 3    aspirin (ECOTRIN) 81 MG EC tablet, Take 81 mg by mouth once daily., Disp: , Rfl:     atorvastatin (LIPITOR) 40 MG tablet, TAKE 1 TABLET BY MOUTH EVERY DAILY, Disp: 90 tablet, Rfl: 3    biotin 1 mg Cap, Take by mouth., Disp: , Rfl:     cloNIDine (CATAPRES) 0.1 MG tablet, clonidine HCl 0.1 mg tablet, Disp: , Rfl:     cyanocobalamin 1,000 mcg TbSR, Take 1,000 mcg by mouth once daily. (Patient taking differently: Take 1,000 mcg by mouth daily as needed. ), Disp: 90 tablet, Rfl: 1    denosumab (PROLIA) 60 mg/mL Syrg, Inject 1 mL (60 mg total) into the skin every 6 (six) months., Disp: 2 mL, Rfl: 0    famotidine (PEPCID) 20 MG tablet, TAKE 1 TABLET BY MOUTH TWO TIMES A DAY, Disp: 180 tablet, Rfl: 3    fluticasone (FLONASE) 50 mcg/actuation nasal spray, 1 spray by Each Nare route 2 (two) times daily as needed for Rhinitis., Disp: , Rfl:     fluticasone propion-salmeterol 115-21 mcg/dose (ADVAIR HFA) 115-21 mcg/actuation HFAA inhaler, Inhale 2 puffs into the lungs every 12 (twelve) hours., Disp: 36 g, Rfl: 3    guaiFENesin (MUCINEX) 600 mg 12 hr tablet, Take 1,200 mg by mouth 2 (two) times daily as needed for Congestion., Disp: , Rfl:     HYDROcodone-acetaminophen (NORCO)  mg per tablet, Take 1 tablet by mouth every 12 (twelve) hours as needed for Pain., Disp: 60 tablet, Rfl: 0    losartan (COZAAR) 100 MG tablet, Take 1 tablet (100 mg total) by mouth once daily., Disp: 90 tablet, Rfl: 3    magnesium oxide 400 mg Cap, Take 1 capsule by mouth every 12 (twelve) hours., Disp: , Rfl:     nicotine (NICODERM CQ) 14 mg/24 hr, Place 1 patch onto the skin once daily., Disp: 14 patch, Rfl: 0     potassium chloride SA (K-DUR,KLOR-CON) 10 MEQ tablet, Take 1 tablet (10 mEq total) by mouth once daily., Disp: 90 tablet, Rfl: 3    torsemide (DEMADEX) 20 MG Tab, Take 2 tablets (40 mg total) by mouth once daily., Disp: 180 tablet, Rfl: 3    umeclidinium (INCRUSE ELLIPTA) 62.5 mcg/actuation DsDv, Inhale 1 puff into the lungs once daily. Controller, Disp: 90 each, Rfl: 3    varenicline (CHANTIX) 1 mg Tab, Take 1 tablet (1 mg total) by mouth once daily., Disp: 60 tablet, Rfl: 0    vitamin D 1000 units Tab, Take 1,000 Units by mouth once daily., Disp: , Rfl:     walker (ULTRA-LIGHT ROLLATOR) Misc, 1 each by Misc.(Non-Drug; Combo Route) route once daily., Disp: 1 each, Rfl: 0        Objective:     Wt Readings from Last 3 Encounters:   11/26/19 104.1 kg (229 lb 8 oz)   09/23/19 95.3 kg (210 lb)   08/30/19 101.4 kg (223 lb 8.7 oz)     Temp Readings from Last 3 Encounters:   09/23/19 97.6 °F (36.4 °C) (Oral)   03/09/19 98.3 °F (36.8 °C) (Oral)   12/15/18 96 °F (35.6 °C) (Oral)     BP Readings from Last 3 Encounters:   11/26/19 (!) 145/68   09/23/19 (!) 157/74   08/30/19 (!) 146/74     Pulse Readings from Last 3 Encounters:   11/26/19 103   09/23/19 102   08/30/19 91        Physical Exam    Constitutional:  well-developed and well-nourished. No distress.   HENT:   Head: Normocephalic and atraumatic.   Neck: Normal range of motion. Neck supple.   Cardiovascular: Normal rate, regular rhythm. Systolic ejection murmur 3/6     Pulmonary/Chest: Effort normal. Mild expiratory wheezes.   Abdominal: Soft. Bowel sounds are normal, no distension. There is no tenderness. There is no rebound and no guarding.   Musculoskeletal: Normal range of motion. +2 LE edema   Neurological: Awake alert and oriented x 4. Motor strength preserved 5/5. DTR symmetrical.  Skin: Skin is warm and dry. No rash noted. She is not diaphoretic. No erythema. No pallor.       Assessment:          Plan:   1. CKD stage G3A: Renal function within baseline. Scr  1.3 on recent lab (1/9/2020). CKD likely 2/2 HFpEF and HTN.    Baseline Creatine:  Lab Results   Component Value Date    CREATININE 1.6 (H) 12/27/2019         2. HTN:  On Torsemide, Losartan and Amlodipine. Will follow and make adjustments as necessary. Oriented patient about proper diet to follow based on her Heart Failure and Hyponatremia.       3. Chronic Hyponatremia: Her labs in the past with the elevated urinary sodium and osmolality in the light of hypontremia point towards SIADH. Wellbutrin and HCTZ could have played a role in the past. However she has a clinical picture consistent with pulmonary hypertension which could lead to hypervolemic hyponatremia. Continue with fluid restriction 1 L/day and salt restriction.  Normonatremic now          4. Anemia: Pt with history of bleeding ulcer and Sat Fe 14%. Refers that has a scheduled colonoscopy at some of point this year. Will recommended doing SPEP, free light chain and immunofixation for the next labs. Recommended follow up from PCP. Anemia not clearly from renal disease.   Lab Results   Component Value Date    HGB 10.4 (L) 12/27/2019     Lab Results   Component Value Date    IRON 41 11/19/2019    TIBC 299 11/19/2019    FERRITIN 84 11/19/2019         Plan:  - Continue fluid restriction ~1.5L  - F/U in clinic 1 year   - SPEP, Serum free light chains and immunofixation. Will follow results.     **Case discussed with Dr. Dav Velasco MD  Nephrology Fellow  [facility]

## 2020-01-09 NOTE — TELEPHONE ENCOUNTER
Supervisor Dottie Richardson called back stating that slide was never made and tube has been disposed. Asked where did the mix up come in at,  he stated that it was a mix up on their end , the  may have not read the order correctly and forgot to run slide .    Lab weill have to be repeated .

## 2020-01-09 NOTE — TELEPHONE ENCOUNTER
Thank you for following up on this     Signed order for path interp diff - please add to lab appt that is already scheduled for today - thanks!

## 2020-01-10 LAB — PATH REV BLD -IMP: NORMAL

## 2020-01-10 NOTE — PROGRESS NOTES
[facility] STAFF NOTE    The note from the fellow/resident was reviewed. I have personally interviewed and examined the patient. There were no additional findings with regards to the history or physical exam.    I agree with the assessment and plan of  Dr. Adni Velasco    Hyponatremia resolved. Her urine sodium has been very low (<20) during her most recent lab checks. That suggests that there is a part of intravascular volume depletion playing a role more such an SIADH type pathophysiology.

## 2020-01-13 ENCOUNTER — TELEPHONE (OUTPATIENT)
Dept: INTERNAL MEDICINE | Facility: CLINIC | Age: 70
End: 2020-01-13

## 2020-01-13 ENCOUNTER — PATIENT MESSAGE (OUTPATIENT)
Dept: INTERNAL MEDICINE | Facility: CLINIC | Age: 70
End: 2020-01-13

## 2020-01-13 DIAGNOSIS — D64.9 ANEMIA, UNSPECIFIED TYPE: ICD-10-CM

## 2020-01-13 DIAGNOSIS — D72.819 LEUKOPENIA, UNSPECIFIED TYPE: Primary | ICD-10-CM

## 2020-01-13 DIAGNOSIS — D72.829 LEUKOCYTOSIS, UNSPECIFIED TYPE: ICD-10-CM

## 2020-01-13 DIAGNOSIS — E83.52 HYPERCALCEMIA: ICD-10-CM

## 2020-01-13 DIAGNOSIS — D64.9 ANEMIA, UNSPECIFIED TYPE: Primary | ICD-10-CM

## 2020-01-13 NOTE — TELEPHONE ENCOUNTER
Calcium, cbc ordered - please arrange in 1 week - please also link free light chains, spep and immunofix electroph in 1 week

## 2020-01-15 ENCOUNTER — PATIENT OUTREACH (OUTPATIENT)
Dept: OTHER | Facility: OTHER | Age: 70
End: 2020-01-15

## 2020-01-15 NOTE — PROGRESS NOTES
Digital Medicine: Health  Follow-Up        Follow Up  Follow-up reason(s): reading review      Readings are missing.   [facility] support needed.Patient got a new phone, and she downloaded the apps for the digital medicine program, but was unable to get everything working. She has labs scheduled at [facility] [place] on Monday 1/20, and agrees to bring her phone and her monitor to the [facility].       INTERVENTION(S)  encouragement/support    PLAN  additional monitoring needed      There are no preventive care reminders to display for this patient.    Last 5 Patient Entered Readings                                      Current 30 Day Average:      Recent Readings 11/23/2019 11/23/2019 11/23/2019 10/8/2019 9/30/2019    SBP (mmHg) 168 153 175 149 138    DBP (mmHg) 97 120 130 84 89    Pulse 99 101 101 100 91                  Screenings    SDOH

## 2020-01-20 ENCOUNTER — LAB VISIT (OUTPATIENT)
Dept: LAB | Facility: OTHER | Age: 70
End: 2020-01-20
Attending: STUDENT IN AN ORGANIZED HEALTH CARE EDUCATION/TRAINING PROGRAM
Payer: MEDICARE

## 2020-01-20 DIAGNOSIS — D64.9 ANEMIA, UNSPECIFIED TYPE: ICD-10-CM

## 2020-01-20 DIAGNOSIS — D72.829 LEUKOCYTOSIS, UNSPECIFIED TYPE: ICD-10-CM

## 2020-01-20 DIAGNOSIS — D50.9 IRON DEFICIENCY ANEMIA, UNSPECIFIED IRON DEFICIENCY ANEMIA TYPE: ICD-10-CM

## 2020-01-20 DIAGNOSIS — E83.52 HYPERCALCEMIA: ICD-10-CM

## 2020-01-20 LAB
BASOPHILS # BLD AUTO: 0.04 K/UL (ref 0–0.2)
BASOPHILS NFR BLD: 0.3 % (ref 0–1.9)
CALCIUM SERPL-MCNC: 8.6 MG/DL (ref 8.7–10.5)
DIFFERENTIAL METHOD: ABNORMAL
EOSINOPHIL # BLD AUTO: 0.1 K/UL (ref 0–0.5)
EOSINOPHIL NFR BLD: 0.7 % (ref 0–8)
ERYTHROCYTE [DISTWIDTH] IN BLOOD BY AUTOMATED COUNT: 13.5 % (ref 11.5–14.5)
FERRITIN SERPL-MCNC: 101 NG/ML (ref 20–300)
HCT VFR BLD AUTO: 30.8 % (ref 37–48.5)
HGB BLD-MCNC: 9.8 G/DL (ref 12–16)
IMM GRANULOCYTES # BLD AUTO: 0.07 K/UL (ref 0–0.04)
IMM GRANULOCYTES NFR BLD AUTO: 0.6 % (ref 0–0.5)
IRON SERPL-MCNC: 86 UG/DL (ref 30–160)
LYMPHOCYTES # BLD AUTO: 1.9 K/UL (ref 1–4.8)
LYMPHOCYTES NFR BLD: 15.4 % (ref 18–48)
MCH RBC QN AUTO: 29.4 PG (ref 27–31)
MCHC RBC AUTO-ENTMCNC: 31.8 G/DL (ref 32–36)
MCV RBC AUTO: 93 FL (ref 82–98)
MONOCYTES # BLD AUTO: 1.2 K/UL (ref 0.3–1)
MONOCYTES NFR BLD: 9.9 % (ref 4–15)
NEUTROPHILS # BLD AUTO: 8.9 K/UL (ref 1.8–7.7)
NEUTROPHILS NFR BLD: 73.1 % (ref 38–73)
NRBC BLD-RTO: 0 /100 WBC
PLATELET # BLD AUTO: 286 K/UL (ref 150–350)
PMV BLD AUTO: 10.1 FL (ref 9.2–12.9)
RBC # BLD AUTO: 3.33 M/UL (ref 4–5.4)
SATURATED IRON: 29 % (ref 20–50)
TOTAL IRON BINDING CAPACITY: 299 UG/DL (ref 250–450)
TRANSFERRIN SERPL-MCNC: 202 MG/DL (ref 200–375)
WBC # BLD AUTO: 12.21 K/UL (ref 3.9–12.7)

## 2020-01-20 PROCEDURE — 86334 PATHOLOGIST INTERPRETATION IFE: ICD-10-PCS | Mod: 26,,, | Performed by: PATHOLOGY

## 2020-01-20 PROCEDURE — 36415 COLL VENOUS BLD VENIPUNCTURE: CPT

## 2020-01-20 PROCEDURE — 86334 IMMUNOFIX E-PHORESIS SERUM: CPT | Mod: 26,,, | Performed by: PATHOLOGY

## 2020-01-20 PROCEDURE — 83540 ASSAY OF IRON: CPT

## 2020-01-20 PROCEDURE — 82310 ASSAY OF CALCIUM: CPT

## 2020-01-20 PROCEDURE — 86334 IMMUNOFIX E-PHORESIS SERUM: CPT

## 2020-01-20 PROCEDURE — 84165 PROTEIN E-PHORESIS SERUM: CPT | Mod: 26,,, | Performed by: PATHOLOGY

## 2020-01-20 PROCEDURE — 82728 ASSAY OF FERRITIN: CPT

## 2020-01-20 PROCEDURE — 85025 COMPLETE CBC W/AUTO DIFF WBC: CPT

## 2020-01-20 PROCEDURE — 84165 PROTEIN E-PHORESIS SERUM: CPT

## 2020-01-20 PROCEDURE — 84165 PATHOLOGIST INTERPRETATION SPE: ICD-10-PCS | Mod: 26,,, | Performed by: PATHOLOGY

## 2020-01-20 PROCEDURE — 83520 IMMUNOASSAY QUANT NOS NONAB: CPT | Mod: 59

## 2020-01-21 LAB
ALBUMIN SERPL ELPH-MCNC: 3.42 G/DL (ref 3.35–5.55)
ALPHA1 GLOB SERPL ELPH-MCNC: 0.35 G/DL (ref 0.17–0.41)
ALPHA2 GLOB SERPL ELPH-MCNC: 0.81 G/DL (ref 0.43–0.99)
B-GLOBULIN SERPL ELPH-MCNC: 0.81 G/DL (ref 0.5–1.1)
GAMMA GLOB SERPL ELPH-MCNC: 0.71 G/DL (ref 0.67–1.58)
INTERPRETATION SERPL IFE-IMP: NORMAL
KAPPA LC SER QL IA: 4.09 MG/DL (ref 0.33–1.94)
KAPPA LC/LAMBDA SER IA: 1.03 (ref 0.26–1.65)
LAMBDA LC SER QL IA: 3.98 MG/DL (ref 0.57–2.63)
PATHOLOGIST INTERPRETATION IFE: NORMAL
PATHOLOGIST INTERPRETATION SPE: NORMAL
PROT SERPL-MCNC: 6.1 G/DL (ref 6–8.4)

## 2020-01-22 ENCOUNTER — TELEPHONE (OUTPATIENT)
Dept: INTERNAL MEDICINE | Facility: CLINIC | Age: 70
End: 2020-01-22

## 2020-01-22 DIAGNOSIS — E53.8 B12 DEFICIENCY: ICD-10-CM

## 2020-01-22 DIAGNOSIS — D64.9 ANEMIA, UNSPECIFIED TYPE: Primary | ICD-10-CM

## 2020-01-22 DIAGNOSIS — D50.9 IRON DEFICIENCY ANEMIA, UNSPECIFIED IRON DEFICIENCY ANEMIA TYPE: ICD-10-CM

## 2020-01-23 NOTE — TELEPHONE ENCOUNTER
Contacted patient and verified if she read comments from provider regarding labs and confirms she has. Scheduled labs and appointment reminder placed in mail.

## 2020-01-31 ENCOUNTER — PATIENT MESSAGE (OUTPATIENT)
Dept: INTERNAL MEDICINE | Facility: CLINIC | Age: 70
End: 2020-01-31

## 2020-01-31 DIAGNOSIS — G89.4 CHRONIC PAIN DISORDER: ICD-10-CM

## 2020-01-31 RX ORDER — HYDROCODONE BITARTRATE AND ACETAMINOPHEN 10; 325 MG/1; MG/1
1 TABLET ORAL EVERY 12 HOURS
Qty: 60 TABLET | Refills: 0 | Status: SHIPPED | OUTPATIENT
Start: 2020-01-31 | End: 2020-03-02 | Stop reason: SDUPTHER

## 2020-02-05 ENCOUNTER — PATIENT OUTREACH (OUTPATIENT)
Dept: OTHER | Facility: OTHER | Age: 70
End: 2020-02-05

## 2020-02-05 NOTE — PROGRESS NOTES
Digital Medicine: Health  Follow-Up        Follow Up  Patient had some mold in her kitchen and has had some of the walls torn out. She is currently in a hotel.     Patient confirms that her BP monitor is now working. She has her cuff with her at her hotel, and is unsure how long she will be staying there.       INTERVENTION(S)  encouragement/support    PLAN  continue monitoring      There are no preventive care reminders to display for this patient.    Last 5 Patient Entered Readings                                      Current 30 Day Average: 146/81     Recent Readings 2/1/2020 11/23/2019 11/23/2019 11/23/2019 10/8/2019    SBP (mmHg) 146 168 153 175 149    DBP (mmHg) 81 97 120 130 84    Pulse - 99 101 101 100             Screenings    SDOH

## 2020-02-26 ENCOUNTER — TELEPHONE (OUTPATIENT)
Dept: INTERNAL MEDICINE | Facility: CLINIC | Age: 70
End: 2020-02-26

## 2020-02-26 NOTE — TELEPHONE ENCOUNTER
Spoke to Ms. Varma and confirmed appt date and time.  Patient states understanding with no further questions.

## 2020-03-02 ENCOUNTER — PATIENT MESSAGE (OUTPATIENT)
Dept: INTERNAL MEDICINE | Facility: CLINIC | Age: 70
End: 2020-03-02

## 2020-03-02 DIAGNOSIS — G89.4 CHRONIC PAIN DISORDER: ICD-10-CM

## 2020-03-02 RX ORDER — FAMOTIDINE 20 MG/1
20 TABLET, FILM COATED ORAL 2 TIMES DAILY
Qty: 180 TABLET | Refills: 3 | Status: ON HOLD | OUTPATIENT
Start: 2020-03-02 | End: 2021-03-04 | Stop reason: HOSPADM

## 2020-03-02 RX ORDER — HYDROCODONE BITARTRATE AND ACETAMINOPHEN 10; 325 MG/1; MG/1
1 TABLET ORAL EVERY 12 HOURS
Qty: 60 TABLET | Refills: 0 | Status: SHIPPED | OUTPATIENT
Start: 2020-03-02 | End: 2020-04-01 | Stop reason: SDUPTHER

## 2020-03-11 ENCOUNTER — PATIENT MESSAGE (OUTPATIENT)
Dept: CARDIOLOGY | Facility: CLINIC | Age: 70
End: 2020-03-11

## 2020-03-13 ENCOUNTER — OFFICE VISIT (OUTPATIENT)
Dept: CARDIOLOGY | Facility: CLINIC | Age: 70
End: 2020-03-13
Payer: MEDICARE

## 2020-03-13 DIAGNOSIS — J44.9 CHRONIC OBSTRUCTIVE PULMONARY DISEASE, UNSPECIFIED COPD TYPE: ICD-10-CM

## 2020-03-13 DIAGNOSIS — I50.30 HEART FAILURE WITH PRESERVED EJECTION FRACTION, BORDERLINE, CLASS III: Primary | ICD-10-CM

## 2020-03-13 DIAGNOSIS — F17.200 TOBACCO DEPENDENCE: ICD-10-CM

## 2020-03-13 DIAGNOSIS — I10 HYPERTENSION, BENIGN: ICD-10-CM

## 2020-03-13 DIAGNOSIS — J96.11 CHRONIC RESPIRATORY FAILURE WITH HYPOXIA: ICD-10-CM

## 2020-03-13 DIAGNOSIS — E78.5 HYPERLIPIDEMIA, UNSPECIFIED HYPERLIPIDEMIA TYPE: ICD-10-CM

## 2020-03-13 DIAGNOSIS — E66.9 OBESITY (BMI 35.0-39.9 WITHOUT COMORBIDITY): ICD-10-CM

## 2020-03-13 PROCEDURE — 99213 PR OFFICE/OUTPT VISIT, EST, LEVL III, 20-29 MIN: ICD-10-PCS | Mod: 95,,, | Performed by: INTERNAL MEDICINE

## 2020-03-13 PROCEDURE — 99499 RISK ADDL DX/OHS AUDIT: ICD-10-PCS | Mod: 95,,, | Performed by: INTERNAL MEDICINE

## 2020-03-13 PROCEDURE — 99213 OFFICE O/P EST LOW 20 MIN: CPT | Mod: 95,,, | Performed by: INTERNAL MEDICINE

## 2020-03-13 PROCEDURE — 1101F PR PT FALLS ASSESS DOC 0-1 FALLS W/OUT INJ PAST YR: ICD-10-PCS | Mod: CPTII,,, | Performed by: INTERNAL MEDICINE

## 2020-03-13 PROCEDURE — 1101F PT FALLS ASSESS-DOCD LE1/YR: CPT | Mod: CPTII,,, | Performed by: INTERNAL MEDICINE

## 2020-03-13 PROCEDURE — 1159F MED LIST DOCD IN RCRD: CPT | Mod: ,,, | Performed by: INTERNAL MEDICINE

## 2020-03-13 PROCEDURE — 99499 UNLISTED E&M SERVICE: CPT | Mod: 95,,, | Performed by: INTERNAL MEDICINE

## 2020-03-13 PROCEDURE — 1159F PR MEDICATION LIST DOCUMENTED IN MEDICAL RECORD: ICD-10-PCS | Mod: ,,, | Performed by: INTERNAL MEDICINE

## 2020-03-13 NOTE — PROGRESS NOTES
Chart has been dictated using voice recognition software.  It is not been reviewed carefully for any transcriptional errors due to this technology.     The patient location is:  Patient Home   The chief complaint leading to consultation is: routine follow-up of hypertension and HFpEF  Visit type: Virtual visit with synchronous audio and video  Total time spent with patient: 18 min  Each patient to whom he or she provides medical services by telemedicine is:  (1) informed of the relationship between the physician and patient and the respective role of any other health care provider with respect to management of the patient; and (2) notified that he or she may decline to receive medical services by telemedicine and may withdraw from such care at any time.    Subjective:   Patient ID:  Nalini Varma is a 69 y.o. female who presents for follow-up of No chief complaint on file.      HPI: Patient with  nonischemic heart failure with normal ejection fraction, SIADH, Chronic obstructive pulmonary disease, Hypertension, Tobacco dependence, and Hyperlipidemia.    Patient having more pain in left leg.  Cannot even touch leg. Breathing more labored due to pain.  Pain in shoulder as well due to leg. Patient denies any chest discomfort on exertion or at rest. Patient denies any palpitations, lightheadedness, or syncope.     Past Medical History:   Diagnosis Date    Allergy     Anemia due to gastrointestinal blood loss     LESLY from gastric ulcer due to chronic nsaid use    Anxiety     Arthritis     CKD (chronic kidney disease) stage 3, GFR 30-59 ml/min     followed by [facility] study - recieved labs from 2017 and 2/2018    Gastric ulcer     H/O knee surgery 2001    right    Heart failure with preserved ejection fraction 8/20/2018    Hx of psychiatric care     Hyperlipidemia     Hyponatremia     Psychiatric problem     Therapy        Outpatient Medications Prior to Visit   Medication Sig Dispense Refill     albuterol (VENTOLIN HFA) 90 mcg/actuation inhaler inhale 1-2 puffs as needed every 6 hours for wheezing and shortness of breath 18 g 11    amLODIPine (NORVASC) 10 MG tablet Take 1 tablet (10 mg total) by mouth once daily. 90 tablet 3    aspirin (ECOTRIN) 81 MG EC tablet Take 81 mg by mouth once daily.      atorvastatin (LIPITOR) 40 MG tablet TAKE 1 TABLET BY MOUTH EVERY DAILY 90 tablet 3    biotin 1 mg Cap Take by mouth.      cyanocobalamin 1,000 mcg TbSR Take 1,000 mcg by mouth once daily. (Patient taking differently: Take 1,000 mcg by mouth daily as needed. ) 90 tablet 1    denosumab (PROLIA) 60 mg/mL Syrg Inject 1 mL (60 mg total) into the skin every 6 (six) months. 2 mL 0    famotidine (PEPCID) 20 MG tablet TAKE 1 TABLET BY MOUTH TWO TIMES A  tablet 3    fluticasone (FLONASE) 50 mcg/actuation nasal spray 1 spray by Each Nare route 2 (two) times daily as needed for Rhinitis.      fluticasone propion-salmeterol 115-21 mcg/dose (ADVAIR HFA) 115-21 mcg/actuation HFAA inhaler Inhale 2 puffs into the lungs every 12 (twelve) hours. 36 g 3    guaiFENesin (MUCINEX) 600 mg 12 hr tablet Take 1,200 mg by mouth 2 (two) times daily as needed for Congestion.      HYDROcodone-acetaminophen (NORCO)  mg per tablet Take 1 tablet by mouth every 12 hours as needed for pain 60 tablet 0    losartan (COZAAR) 100 MG tablet Take 1 tablet (100 mg total) by mouth once daily. 90 tablet 3    magnesium oxide 400 mg Cap Take 1 capsule by mouth every 12 (twelve) hours.      nicotine (NICODERM CQ) 14 mg/24 hr Place 1 patch onto the skin once daily. (Patient not taking: Reported on 1/9/2020) 14 patch 0    potassium chloride SA (K-DUR,KLOR-CON) 10 MEQ tablet Take 1 tablet (10 mEq total) by mouth once daily. 90 tablet 3    torsemide (DEMADEX) 20 MG Tab Take 2 tablets (40 mg total) by mouth once daily. 180 tablet 3    umeclidinium (INCRUSE ELLIPTA) 62.5 mcg/actuation DsDv Inhale 1 puff into the lungs once daily.  Controller 90 each 3    varenicline (CHANTIX) 1 mg Tab Take 1 tablet (1 mg total) by mouth once daily. (Patient not taking: Reported on 1/9/2020) 60 tablet 0    vitamin D 1000 units Tab Take 1,000 Units by mouth once daily.      walker (ULTRA-LIGHT ROLLATOR) Misc 1 each by Misc.(Non-Drug; Combo Route) route once daily. 1 each 0     No facility-administered medications prior to visit.        ROS  Other than left side body pain, no change from prior visit in neurologic, respiratory, endocrine, GI, hematologic, or constitutional complaints   Objective:   Physical Exam   Constitutional:   BP (12-Mar-2020) - 178/98   Musculoskeletal:   Legs show erythema from mid-calf to ankles bilat. Do not appear more swollen than previously         Lab Results   Component Value Date     01/09/2020    K 4.5 01/09/2020    BUN 18 01/09/2020    CREATININE 1.3 01/09/2020    GLU 98 01/09/2020    HGBA1C 5.3 12/11/2018    CHOL 147 11/19/2019    HDL 50 11/19/2019    LDLCALC 68.6 11/19/2019    TRIG 142 11/19/2019    CHOLHDL 34.0 11/19/2019    HGB 9.8 (L) 01/20/2020    HCT 30.8 (L) 01/20/2020     01/20/2020    INR 0.9 12/10/2018       Assessment:     1. Heart failure with preserved ejection fraction, borderline, class III    2. Chronic obstructive pulmonary disease, unspecified COPD type    3. Hypertension, benign    4. Tobacco dependence    5. Hyperlipidemia, unspecified hyperlipidemia type    6. Chronic respiratory failure with hypoxia    7. Obesity (BMI 35.0-39.9 without comorbidity)      Patient appears to be stable from a heart failure point of view.  Her blood pressure and episodes shortness of breath appear to be related to increased pain from her leg which is due to a chronic orthopedic problem.  However, there is slight concern for possible chronic DVT.  Patient will send a note in 4-5 days with an update to her leg pain breathing in blood pressure.  Her blood pressure continues to be monitored through digital  hypertension.  Her blood pressure remains elevated for another week, additional medication will be needed.  At that time, spironolactone might be considered as an additional agent.  Currently, unless there are intervening problems, patient should return for re-evaluation in 3 months.  However, follow-up will depend on patient's short-term course and how she reports that she is doing.  Plan:     Diagnoses and all orders for this visit:    Heart failure with preserved ejection fraction, borderline, class III    Chronic obstructive pulmonary disease, unspecified COPD type    Hypertension, benign    Tobacco dependence    Hyperlipidemia, unspecified hyperlipidemia type    Chronic respiratory failure with hypoxia    Obesity (BMI 35.0-39.9 without comorbidity)          Parvez Ortez MD  Consultative Cardiology

## 2020-03-13 NOTE — Clinical Note
Thank you for referring Nalini Varma for follow-up of hypertension and HFpEF. Please see my note for details of this encounter. If you have any questions, please contact me.  Thank you again for the referral.

## 2020-03-16 ENCOUNTER — PATIENT MESSAGE (OUTPATIENT)
Dept: CARDIOLOGY | Facility: CLINIC | Age: 70
End: 2020-03-16

## 2020-03-17 ENCOUNTER — PATIENT OUTREACH (OUTPATIENT)
Dept: OTHER | Facility: OTHER | Age: 70
End: 2020-03-17

## 2020-03-17 NOTE — PROGRESS NOTES
"Digital Medicine: Clinician Follow-Up    Called patient for BP follow up. She is doing well today with no complaints. She says that her BP cuff "keeps curling up". She says she has stored it in the box curled up and "now it won't lay flat". She says she can't say for sure if she thinks BP readings are accurate because cuff is not fitting appropriately. Patient does say that BP could have been really high because she was in extreme pain.     The history is provided by the patient.     Follow Up  Follow-up reason(s): reading review      Readings are missing.   patient reminder needed.      INTERVENTION(S)  encouragement/support    PLAN  patient verbalizes understanding and continue monitoring    No changes made to medication regimen today.     Will email [facility] and see if patient can qualify for new BP cuff by mail.     If BP readings remain elevated, will likely start spironolactone as indicated in Dr. Ortez's most recent note.       There are no preventive care reminders to display for this patient.    Last 5 Patient Entered Readings                                      Current 30 Day Average: 178/104     Recent Readings 3/12/2020 3/12/2020 3/12/2020 3/12/2020 2/1/2020    SBP (mmHg) 178 176 203 207 146    DBP (mmHg) 98 101 102 116 81        Hypertension Medications     amLODIPine (NORVASC) 10 MG tablet Take 1 tablet (10 mg total) by mouth once daily.    losartan (COZAAR) 100 MG tablet Take 1 tablet (100 mg total) by mouth once daily.    torsemide (DEMADEX) 20 MG Tab Take 2 tablets (40 mg total) by mouth once daily.                 Screenings  "

## 2020-03-20 ENCOUNTER — PATIENT OUTREACH (OUTPATIENT)
Dept: OTHER | Facility: OTHER | Age: 70
End: 2020-03-20

## 2020-03-20 NOTE — PROGRESS NOTES
"Digital Medicine: Health  Follow-Up        Follow Up  Follow-up reason(s): reading review      Readings are trending up Patient has been having problems with her cuff. The tubing is "kinked" and she believes this is causing falsely elevated readings. Sent email to [email or web address] returns requesting that they mail patient a new arm piece to her BP monitor. We discussed that [facility] mail order is preferable than asking Ms. Varma to go to the [facility] to avoid virus exposure. Patient is aware that [facility] will be contacting her.               PLAN  continue monitoring    Follow up on cuff exchange via [employer]      There are no preventive care reminders to display for this patient.    Last 5 Patient Entered Readings                                      Current 30 Day Average: 178/104     Recent Readings 3/12/2020 3/12/2020 3/12/2020 3/12/2020 2/1/2020    SBP (mmHg) 178 176 203 207 146    DBP (mmHg) 98 101 102 116 81                  Screenings    SDOH  "

## 2020-03-27 ENCOUNTER — PATIENT MESSAGE (OUTPATIENT)
Dept: INTERNAL MEDICINE | Facility: CLINIC | Age: 70
End: 2020-03-27

## 2020-04-01 ENCOUNTER — OFFICE VISIT (OUTPATIENT)
Dept: INTERNAL MEDICINE | Facility: CLINIC | Age: 70
End: 2020-04-01
Attending: INTERNAL MEDICINE
Payer: MEDICARE

## 2020-04-01 ENCOUNTER — PATIENT MESSAGE (OUTPATIENT)
Dept: INTERNAL MEDICINE | Facility: CLINIC | Age: 70
End: 2020-04-01

## 2020-04-01 ENCOUNTER — TELEPHONE (OUTPATIENT)
Dept: INTERNAL MEDICINE | Facility: CLINIC | Age: 70
End: 2020-04-01

## 2020-04-01 DIAGNOSIS — I50.30 HEART FAILURE WITH PRESERVED EJECTION FRACTION, UNSPECIFIED HF CHRONICITY: ICD-10-CM

## 2020-04-01 DIAGNOSIS — J44.9 CHRONIC OBSTRUCTIVE PULMONARY DISEASE, UNSPECIFIED COPD TYPE: ICD-10-CM

## 2020-04-01 DIAGNOSIS — I10 HYPERTENSION, BENIGN: Primary | ICD-10-CM

## 2020-04-01 DIAGNOSIS — G89.4 CHRONIC PAIN DISORDER: ICD-10-CM

## 2020-04-01 DIAGNOSIS — F17.200 TOBACCO DEPENDENCE: ICD-10-CM

## 2020-04-01 DIAGNOSIS — F11.20 UNCOMPLICATED OPIOID DEPENDENCE: ICD-10-CM

## 2020-04-01 DIAGNOSIS — Z87.891 PERSONAL HISTORY OF NICOTINE DEPENDENCE: ICD-10-CM

## 2020-04-01 DIAGNOSIS — Z02.89 PAIN MANAGEMENT CONTRACT SIGNED: ICD-10-CM

## 2020-04-01 DIAGNOSIS — Z12.9 SCREENING FOR CANCER: ICD-10-CM

## 2020-04-01 DIAGNOSIS — Z99.81 ON HOME OXYGEN THERAPY: ICD-10-CM

## 2020-04-01 PROCEDURE — 1159F MED LIST DOCD IN RCRD: CPT | Mod: S$GLB,,, | Performed by: INTERNAL MEDICINE

## 2020-04-01 PROCEDURE — 1101F PT FALLS ASSESS-DOCD LE1/YR: CPT | Mod: CPTII,S$GLB,, | Performed by: INTERNAL MEDICINE

## 2020-04-01 PROCEDURE — 1101F PR PT FALLS ASSESS DOC 0-1 FALLS W/OUT INJ PAST YR: ICD-10-PCS | Mod: CPTII,S$GLB,, | Performed by: INTERNAL MEDICINE

## 2020-04-01 PROCEDURE — 99214 OFFICE O/P EST MOD 30 MIN: CPT | Mod: 95,,, | Performed by: INTERNAL MEDICINE

## 2020-04-01 PROCEDURE — 99214 PR OFFICE/OUTPT VISIT, EST, LEVL IV, 30-39 MIN: ICD-10-PCS | Mod: 95,,, | Performed by: INTERNAL MEDICINE

## 2020-04-01 PROCEDURE — 1159F PR MEDICATION LIST DOCUMENTED IN MEDICAL RECORD: ICD-10-PCS | Mod: S$GLB,,, | Performed by: INTERNAL MEDICINE

## 2020-04-01 RX ORDER — HYDROCODONE BITARTRATE AND ACETAMINOPHEN 10; 325 MG/1; MG/1
1 TABLET ORAL EVERY 12 HOURS
Qty: 60 TABLET | Refills: 0 | Status: SHIPPED | OUTPATIENT
Start: 2020-04-01 | End: 2020-04-29 | Stop reason: SDUPTHER

## 2020-04-01 RX ORDER — AMLODIPINE BESYLATE 10 MG/1
10 TABLET ORAL DAILY
Qty: 90 TABLET | Refills: 3 | Status: ON HOLD | OUTPATIENT
Start: 2020-04-01 | End: 2021-05-06 | Stop reason: HOSPADM

## 2020-04-01 NOTE — PROGRESS NOTES
Subjective:   Patient ID: Nalini Varma is a 69 y.o. female  Chief complaint: No chief complaint on file.      HPI   The patient location is: [place]  The chief complaint leading to consultation is: chronic pain mgmt  Visit type: Virtual visit with synchronous audio and video  Total time spent with patient: 22 minutes  Each patient to whom he or she provides medical services by telemedicine is:  (1) informed of the relationship between the physician and patient and the respective role of any other health care provider with respect to management of the patient; and (2) notified that he or she may decline to receive medical services by telemedicine and may withdraw from such care at any time.    Notes:   Patient opted for virtual visit due to risk of COVID- 19.    Social distancing   Has friend who is picking up and delivering her groceries   Had to move out of apt for 1 week after mold was found     Pt with hx of HFpEF (had LHC and RHC 2018), chronic hyponatremia, anasarca, HTN, tobacco dep, COPD on 2L home O2, osteoporosis with L3 compression fracture on alendronate and then switched to prolia due to changes in renal function, hx of LESLY 2/2 GIB due to NSAID use, hypoMag, hyponatremia suspected 2/2 SIADH in past,  thyroid nodule: s/p FNA 5/2017 and benign, hx of pulm nodules with repeat CT 3/20/2019 with stable opacities that do not require additional f/u, OA of shoulders and knees with L knee > Right after surgery followed by ortho outside of [facility] - sx currently managed with norco bid     Since had virtual visit with cards reports bp has improved and leg (chronic knee) pain improved and at baseline at this time   Has lost weight and now down to 204 on her home scale due to intentionally eating smaller portions, no snacking - lost 15 pounds over past couple of months after diet modification   - enrolled in dig htn prog and last reading was 134/79 on 4/1/2020   - she reports the cuff cord was curling up on her  and she finally fixed bp cuff - reports readings 179-200/100s was due to cuff issues above at that time and not true bp readings     Chronic pain 2/2 chronic OA of left knee s/p surgery:   - UTD urine tox screen 8/2019  - pain contract completed   - nelson norco bid   - fill rx at same pharmacy   - no early refill requests   - followed by ortho at [facility] - no plans for surgery due to comorbidities and c/f for poor results (no improvement of symptoms) per pt    reviewed and consistent       Tob:   -enrolled in tob cess counseling   utd on ct chest screen 3/20/2019  - has significantly cut down on tobacco but not quit at this time      Diastolic heart failure and HTN, anasarca:   Taking torsemide 40mg once daily (will take additional dose prn weight gain), losartan 100, amlodipine 10    - following water restriction - 1 liter       COPD on 2L oxygen : stable - nelson inhalers   utd on ct chest as above    - no new wheezing, sob, fevers or MEDELLIN       Anemia:   - iron stable   - no nsaids   - is taking asa 81   - no gross bleeding   - utd on cscope   - Had virtual visit with gi   Reports last cscope was 5 years ago and reports no scope indicated this year - will revisit in 6 months with him   - due for 3 month labs but deferring due to risk of covid    Review of Systems    Objective:  There were no vitals filed for this visit.  There is no height or weight on file to calculate BMI.    Physical Exam   Constitutional: She is oriented to person, place, and time. She appears well-developed and well-nourished. No distress.   Eyes: Conjunctivae and EOM are normal. Right eye exhibits no discharge. Left eye exhibits no discharge.   Pulmonary/Chest: Effort normal. No respiratory distress.   Talking in complete sentences   No audible wheezes   Musculoskeletal: She exhibits edema. She exhibits no tenderness.   Ankle LE of B ankles (stable)  No  ttp per pt or open wounds/skin breakdown   Neurological: She is alert and oriented to  person, place, and time.   Skin: Skin is warm. She is not diaphoretic. No erythema.   Psychiatric: She has a normal mood and affect. Her behavior is normal. Judgment and thought content normal.     Assessment:  1. Hypertension, benign    2. Chronic pain disorder    3. Screening for cancer    4. Uncomplicated opioid dependence    5. Tobacco dependence    6. On home oxygen therapy    7. Heart failure with preserved ejection fraction, unspecified HF chronicity    8. Chronic obstructive pulmonary disease, unspecified COPD type    9. Personal history of nicotine dependence     10. Pain management contract signed        Plan:  Diagnoses and all orders for this visit:    Hypertension, benign    Chronic pain disorder  -     HYDROcodone-acetaminophen (NORCO)  mg per tablet; Take 1 tablet by mouth every 12 hours as needed for pain    Screening for cancer  -     CT Chest Lung Screening Low Dose; Future    Uncomplicated opioid dependence    Tobacco dependence    On home oxygen therapy    Heart failure with preserved ejection fraction, unspecified HF chronicity    Chronic obstructive pulmonary disease, unspecified COPD type    Personal history of nicotine dependence   -     CT Chest Lung Screening Low Dose; Future    Pain management contract signed    Other orders  -     amLODIPine (NORVASC) 10 MG tablet; Take 1 tablet (10 mg total) by mouth once daily.    cont current medications.   Cont dig htn prog    Pt to call clinic to get refill towards end of 30 days. Counseled to not drive or operate heavy machinery while taking pain meds. Counseled on withdrawal symptoms and risk of addiction.    reviewed and consistent     Schedule labs and ct chest in 2-3 months - deferring now due to risk of covid    Had virtual visit with gi   Reports last cscope was 5 years ago and reports no scope indicated this year - will revisit in 6 months with him     Health Maintenance   Topic Date Due    Colonoscopy  05/22/2020    Mammogram   02/20/2021    DEXA SCAN  09/10/2022    Lipid Panel  11/19/2024    TETANUS VACCINE  01/29/2025    Hepatitis C Screening  Completed    Pneumococcal Vaccine (65+ High/Highest Risk)  Completed

## 2020-04-01 NOTE — TELEPHONE ENCOUNTER
Please schedule CT chest in 3 months  Please reschedule labs currently scheduled for later this month to 3 months from now  Please schedule for f/u with me in 3 months

## 2020-04-06 ENCOUNTER — TELEPHONE (OUTPATIENT)
Dept: INTERNAL MEDICINE | Facility: CLINIC | Age: 70
End: 2020-04-06

## 2020-04-06 DIAGNOSIS — M81.0 OSTEOPOROSIS: ICD-10-CM

## 2020-04-07 ENCOUNTER — PATIENT OUTREACH (OUTPATIENT)
Dept: OTHER | Facility: OTHER | Age: 70
End: 2020-04-07

## 2020-04-07 NOTE — PROGRESS NOTES
Digital Medicine: Clinician Follow-Up    Called patient for BP follow up. Patient is doing well today with no complaints. She says that she fixed her BP cuff and feels confident in the readings.     The history is provided by the patient.     Follow Up  Follow-up reason(s): reading review and routine education      Readings are trending down due to improved technique.  Patient's 30-day BP average is above goal of <130/<80 mmHg.         INTERVENTION(S)  encouragement/support    PLAN  patient verbalizes understanding, additional monitoring needed and continue monitoring    Continue current BP medications.     Resume frequent BP monitoring      There are no preventive care reminders to display for this patient.    Last 5 Patient Entered Readings                                      Current 30 Day Average: 152/97     Recent Readings 4/1/2020 3/27/2020 3/12/2020 3/12/2020 3/12/2020    SBP (mmHg) 134 145 178 176 203    DBP (mmHg) 79 88 98 101 102        Hypertension Medications     amLODIPine (NORVASC) 10 MG tablet Take 1 tablet (10 mg total) by mouth once daily.    losartan (COZAAR) 100 MG tablet Take 1 tablet (100 mg total) by mouth once daily.    torsemide (DEMADEX) 20 MG Tab Take 2 tablets (40 mg total) by mouth once daily.                 Screenings

## 2020-04-23 ENCOUNTER — PATIENT OUTREACH (OUTPATIENT)
Dept: OTHER | Facility: OTHER | Age: 70
End: 2020-04-23

## 2020-04-29 DIAGNOSIS — G89.4 CHRONIC PAIN DISORDER: ICD-10-CM

## 2020-04-30 RX ORDER — HYDROCODONE BITARTRATE AND ACETAMINOPHEN 10; 325 MG/1; MG/1
1 TABLET ORAL EVERY 12 HOURS
Qty: 60 TABLET | Refills: 0 | Status: SHIPPED | OUTPATIENT
Start: 2020-04-30 | End: 2020-06-01 | Stop reason: SDUPTHER

## 2020-05-06 NOTE — PROGRESS NOTES
Last BP reading submitted was 4/10/2020. Health  has open encounter. Will push back outreach for follow up.

## 2020-05-25 NOTE — PROGRESS NOTES
Infrequent BP readings. Last BP submitted was on 5/20/2020. BP was 125/57 mmHg, which is at goal of <130/<80 mmHg.     Health  has open encounter and attempting to address .

## 2020-05-27 ENCOUNTER — CLINICAL SUPPORT (OUTPATIENT)
Dept: SMOKING CESSATION | Facility: CLINIC | Age: 70
End: 2020-05-27
Payer: COMMERCIAL

## 2020-05-27 DIAGNOSIS — F17.200 NICOTINE DEPENDENCE: Primary | ICD-10-CM

## 2020-05-27 PROCEDURE — 99407 BEHAV CHNG SMOKING > 10 MIN: CPT | Mod: S$GLB,,, | Performed by: INTERNAL MEDICINE

## 2020-05-27 PROCEDURE — 99407 PR TOBACCO USE CESSATION INTENSIVE >10 MINUTES: ICD-10-PCS | Mod: S$GLB,,, | Performed by: INTERNAL MEDICINE

## 2020-05-27 NOTE — PROGRESS NOTES
Spoke with patient today in regard to smoking cessation progress for 12 month telephone follow up, she states not tobacco free. Patient states no interest in returning to the program at this time. Informed patient of benefit period and contact information if any further help or support is needed. Will resolve episode and complete smart form for Quit attempt #2.

## 2020-05-29 NOTE — PROGRESS NOTES
"Digital Medicine: Health  Follow-Up        Follow Up  Follow-up reason(s): reading review    Patient reports that she has been feeling nervous today. She meant to recheck her BP yesterday, but she has been busy. She will be moving soon, and "all of the sudden" has to go look for a place. She is not looking forward to moving to [facility], and does not like the more remote location.     Patient is anxious about leaving the house. She states that she can't wear a mask because her "ears bend", and she can't hold a mask to her face because she uses a walker. Patient reports that she hasn't left the house in 3 months.     Patient describes that her son has a "brain abnormality" resulting from an infection. She has anxiety about this as well.      INTERVENTION(S)  encouragement/support    PLAN  continue monitoring      There are no preventive care reminders to display for this patient.    Last 5 Patient Entered Readings                                      Current 30 Day Average: 125/57     Recent Readings 5/20/2020 4/10/2020 4/1/2020 3/27/2020 3/12/2020    SBP (mmHg) 125 150 134 145 178    DBP (mmHg) 57 78 79 88 98                      Diet Screening   No change to diet.      Barriers to a Healthy Diet: financial and time/convenience    Patient complains that her friend who has been picking up groceries for her has been making mistakes, and doesn't know what produce is. Patient has difficulty affording groceries and acquiring transpotation.     Physical Activity Screening   No change to exercise routine.          SDOH  "

## 2020-06-01 ENCOUNTER — PATIENT MESSAGE (OUTPATIENT)
Dept: INTERNAL MEDICINE | Facility: CLINIC | Age: 70
End: 2020-06-01

## 2020-06-01 DIAGNOSIS — G89.4 CHRONIC PAIN DISORDER: ICD-10-CM

## 2020-06-01 RX ORDER — HYDROCODONE BITARTRATE AND ACETAMINOPHEN 10; 325 MG/1; MG/1
1 TABLET ORAL EVERY 12 HOURS
Qty: 60 TABLET | Refills: 0 | Status: SHIPPED | OUTPATIENT
Start: 2020-06-01 | End: 2020-07-01 | Stop reason: SDUPTHER

## 2020-06-02 ENCOUNTER — PATIENT MESSAGE (OUTPATIENT)
Dept: INTERNAL MEDICINE | Facility: CLINIC | Age: 70
End: 2020-06-02

## 2020-06-02 DIAGNOSIS — J96.11 CHRONIC RESPIRATORY FAILURE WITH HYPOXIA: Primary | ICD-10-CM

## 2020-06-02 RX ORDER — ALBUTEROL SULFATE 90 UG/1
AEROSOL, METERED RESPIRATORY (INHALATION)
Qty: 25.5 G | Refills: 11 | Status: ON HOLD | OUTPATIENT
Start: 2020-06-02 | End: 2021-09-07 | Stop reason: SDUPTHER

## 2020-06-11 NOTE — TELEPHONE ENCOUNTER
reviewed and consistent.    MA returned Ms. Lomeli's call.     She is calling to report her father had several episodes of diarrhea and she believes he is having a flare up. She started giving him Lomotil today to see if that helps with the diarrhea.     Mr. Sainz is on a strict lactose free diet, but his daughter stated, he is complaining everything is irritate his stomach.    She does report that he has a decrease appetite, but they are able to get him to eat 3 meals per day.     She would like to be seen in the a week or two for evaluation of his symptoms.

## 2020-06-19 ENCOUNTER — PATIENT MESSAGE (OUTPATIENT)
Dept: INTERNAL MEDICINE | Facility: CLINIC | Age: 70
End: 2020-06-19

## 2020-06-19 DIAGNOSIS — R60.9 EDEMA, UNSPECIFIED TYPE: ICD-10-CM

## 2020-06-19 RX ORDER — TORSEMIDE 20 MG/1
40 TABLET ORAL DAILY
Qty: 180 TABLET | Refills: 3 | Status: SHIPPED | OUTPATIENT
Start: 2020-06-19 | End: 2021-02-22 | Stop reason: SDUPTHER

## 2020-06-22 ENCOUNTER — TELEPHONE (OUTPATIENT)
Dept: PHARMACY | Facility: CLINIC | Age: 70
End: 2020-06-22

## 2020-06-29 ENCOUNTER — TELEPHONE (OUTPATIENT)
Dept: INTERNAL MEDICINE | Facility: CLINIC | Age: 70
End: 2020-06-29

## 2020-06-29 ENCOUNTER — TELEPHONE (OUTPATIENT)
Dept: PHARMACY | Facility: CLINIC | Age: 70
End: 2020-06-29

## 2020-06-29 NOTE — TELEPHONE ENCOUNTER
----- Message from Sofi Bean sent at 6/29/2020 10:49 AM CDT -----  Regarding: Prolia appt  Hi my name is Sofi I'm from the [facility],    I spoke with the patient and she doesn't have a appointment schedule for her Prolia. Can someone contact the patient and keep me informed so the medication can be there in timely manner.         Thank you,    Sofi Bean  [facility]  [phone]

## 2020-07-01 ENCOUNTER — PATIENT MESSAGE (OUTPATIENT)
Dept: INTERNAL MEDICINE | Facility: CLINIC | Age: 70
End: 2020-07-01

## 2020-07-01 DIAGNOSIS — G89.4 CHRONIC PAIN DISORDER: ICD-10-CM

## 2020-07-01 RX ORDER — HYDROCODONE BITARTRATE AND ACETAMINOPHEN 10; 325 MG/1; MG/1
1 TABLET ORAL EVERY 12 HOURS
Qty: 60 TABLET | Refills: 0 | Status: SHIPPED | OUTPATIENT
Start: 2020-07-01 | End: 2020-08-03 | Stop reason: SDUPTHER

## 2020-07-08 ENCOUNTER — PATIENT OUTREACH (OUTPATIENT)
Dept: ADMINISTRATIVE | Facility: OTHER | Age: 70
End: 2020-07-08

## 2020-07-08 NOTE — PROGRESS NOTES
Requested updates within Care Everywhere.  Patient's chart was reviewed for overdue VIPUL topics.  Media reviewed for outside Colon Ca report.  Immunizations reconciled.    Orders placed:n/a  Tasked appts:n/a  Labs Linked:n/a

## 2020-07-09 ENCOUNTER — PATIENT OUTREACH (OUTPATIENT)
Dept: OTHER | Facility: OTHER | Age: 70
End: 2020-07-09

## 2020-07-09 NOTE — PROGRESS NOTES
Digital Medicine: Health  Follow-Up    The history is provided by the patient.   Follow Up  Follow-up reason(s): reading review      Readings are missing.   patient reminder needed.Patient just moved last week, and she is in the process of unpacking boxes. She will resume with BP monitoring when she locates her cuff      INTERVENTION(S)  encouragement/support    PLAN  continue monitoring      There are no preventive care reminders to display for this patient.    Last 5 Patient Entered Readings                                      Current 30 Day Average:      Recent Readings 5/20/2020 4/10/2020 4/1/2020 3/27/2020 3/12/2020    SBP (mmHg) 125 150 134 145 178    DBP (mmHg) 57 78 79 88 98                  Screenings    SDOH

## 2020-07-13 ENCOUNTER — TELEPHONE (OUTPATIENT)
Dept: INTERNAL MEDICINE | Facility: CLINIC | Age: 70
End: 2020-07-13

## 2020-07-23 ENCOUNTER — TELEPHONE (OUTPATIENT)
Dept: INTERNAL MEDICINE | Facility: CLINIC | Age: 70
End: 2020-07-23

## 2020-07-23 ENCOUNTER — HOSPITAL ENCOUNTER (OUTPATIENT)
Dept: RADIOLOGY | Facility: OTHER | Age: 70
Discharge: HOME | End: 2020-07-23
Attending: INTERNAL MEDICINE
Payer: MEDICARE

## 2020-07-23 DIAGNOSIS — Z87.891 PERSONAL HISTORY OF NICOTINE DEPENDENCE: ICD-10-CM

## 2020-07-23 DIAGNOSIS — Z12.9 SCREENING FOR CANCER: ICD-10-CM

## 2020-07-23 DIAGNOSIS — Z72.0 TOBACCO ABUSE: ICD-10-CM

## 2020-07-23 DIAGNOSIS — R93.89 ABNORMAL CT OF THE CHEST: Primary | ICD-10-CM

## 2020-07-23 PROCEDURE — G0297 LDCT FOR LUNG CA SCREEN: HCPCS | Mod: 26,,, | Performed by: RADIOLOGY

## 2020-07-23 PROCEDURE — G0297 CT CHEST LUNG SCREENING LOW DOSE: ICD-10-PCS | Mod: 26,,, | Performed by: RADIOLOGY

## 2020-07-23 PROCEDURE — G0297 LDCT FOR LUNG CA SCREEN: HCPCS | Mod: TC

## 2020-07-23 NOTE — TELEPHONE ENCOUNTER
Message sent to pt via my chart with lab results and updates to plan.     Please arrange appt with pulm ASAP to review increase in size of lung nodule in smoker and next best step in evaluating this

## 2020-07-24 ENCOUNTER — OFFICE VISIT (OUTPATIENT)
Dept: HEMATOLOGY/ONCOLOGY | Facility: CLINIC | Age: 70
End: 2020-07-24
Attending: INTERNAL MEDICINE
Payer: MEDICARE

## 2020-07-24 ENCOUNTER — OFFICE VISIT (OUTPATIENT)
Dept: INTERNAL MEDICINE | Facility: CLINIC | Age: 70
End: 2020-07-24
Attending: INTERNAL MEDICINE
Payer: MEDICARE

## 2020-07-24 VITALS
OXYGEN SATURATION: 98 % | HEART RATE: 106 BPM | WEIGHT: 238.75 LBS | BODY MASS INDEX: 38.54 KG/M2 | SYSTOLIC BLOOD PRESSURE: 144 MMHG | DIASTOLIC BLOOD PRESSURE: 52 MMHG

## 2020-07-24 VITALS
HEART RATE: 101 BPM | SYSTOLIC BLOOD PRESSURE: 143 MMHG | RESPIRATION RATE: 18 BRPM | TEMPERATURE: 99 F | OXYGEN SATURATION: 100 % | DIASTOLIC BLOOD PRESSURE: 62 MMHG

## 2020-07-24 DIAGNOSIS — I10 HYPERTENSION, BENIGN: ICD-10-CM

## 2020-07-24 DIAGNOSIS — D64.9 ANEMIA, UNSPECIFIED TYPE: Primary | ICD-10-CM

## 2020-07-24 DIAGNOSIS — Z12.31 ENCOUNTER FOR SCREENING MAMMOGRAM FOR MALIGNANT NEOPLASM OF BREAST: ICD-10-CM

## 2020-07-24 DIAGNOSIS — E83.42 HYPOMAGNESEMIA: ICD-10-CM

## 2020-07-24 DIAGNOSIS — J96.11 CHRONIC RESPIRATORY FAILURE WITH HYPOXIA: ICD-10-CM

## 2020-07-24 DIAGNOSIS — Z72.0 TOBACCO ABUSE: ICD-10-CM

## 2020-07-24 DIAGNOSIS — R91.1 LUNG NODULE: ICD-10-CM

## 2020-07-24 DIAGNOSIS — D64.9 CHRONIC ANEMIA: Primary | ICD-10-CM

## 2020-07-24 PROCEDURE — 1125F AMNT PAIN NOTED PAIN PRSNT: CPT | Mod: S$GLB,,, | Performed by: INTERNAL MEDICINE

## 2020-07-24 PROCEDURE — 3077F SYST BP >= 140 MM HG: CPT | Mod: CPTII,S$GLB,, | Performed by: INTERNAL MEDICINE

## 2020-07-24 PROCEDURE — 99499 UNLISTED E&M SERVICE: CPT | Mod: S$GLB,,, | Performed by: INTERNAL MEDICINE

## 2020-07-24 PROCEDURE — 1125F PR PAIN SEVERITY QUANTIFIED, PAIN PRESENT: ICD-10-PCS | Mod: S$GLB,,, | Performed by: INTERNAL MEDICINE

## 2020-07-24 PROCEDURE — 3078F DIAST BP <80 MM HG: CPT | Mod: CPTII,S$GLB,, | Performed by: INTERNAL MEDICINE

## 2020-07-24 PROCEDURE — 99214 PR OFFICE/OUTPT VISIT, EST, LEVL IV, 30-39 MIN: ICD-10-PCS | Mod: S$GLB,,, | Performed by: INTERNAL MEDICINE

## 2020-07-24 PROCEDURE — 1101F PR PT FALLS ASSESS DOC 0-1 FALLS W/OUT INJ PAST YR: ICD-10-PCS | Mod: CPTII,S$GLB,, | Performed by: INTERNAL MEDICINE

## 2020-07-24 PROCEDURE — 99999 PR PBB SHADOW E&M-EST. PATIENT-LVL V: CPT | Mod: PBBFAC,,, | Performed by: INTERNAL MEDICINE

## 2020-07-24 PROCEDURE — 3078F PR MOST RECENT DIASTOLIC BLOOD PRESSURE < 80 MM HG: ICD-10-PCS | Mod: CPTII,S$GLB,, | Performed by: INTERNAL MEDICINE

## 2020-07-24 PROCEDURE — 99214 OFFICE O/P EST MOD 30 MIN: CPT | Mod: S$GLB,,, | Performed by: INTERNAL MEDICINE

## 2020-07-24 PROCEDURE — 99499 RISK ADDL DX/OHS AUDIT: ICD-10-PCS | Mod: S$GLB,,, | Performed by: INTERNAL MEDICINE

## 2020-07-24 PROCEDURE — 99204 OFFICE O/P NEW MOD 45 MIN: CPT | Mod: S$GLB,,, | Performed by: INTERNAL MEDICINE

## 2020-07-24 PROCEDURE — 1159F PR MEDICATION LIST DOCUMENTED IN MEDICAL RECORD: ICD-10-PCS | Mod: S$GLB,,, | Performed by: INTERNAL MEDICINE

## 2020-07-24 PROCEDURE — 3077F PR MOST RECENT SYSTOLIC BLOOD PRESSURE >= 140 MM HG: ICD-10-PCS | Mod: CPTII,S$GLB,, | Performed by: INTERNAL MEDICINE

## 2020-07-24 PROCEDURE — 1101F PT FALLS ASSESS-DOCD LE1/YR: CPT | Mod: CPTII,S$GLB,, | Performed by: INTERNAL MEDICINE

## 2020-07-24 PROCEDURE — 1159F MED LIST DOCD IN RCRD: CPT | Mod: S$GLB,,, | Performed by: INTERNAL MEDICINE

## 2020-07-24 PROCEDURE — 3008F PR BODY MASS INDEX (BMI) DOCUMENTED: ICD-10-PCS | Mod: CPTII,S$GLB,, | Performed by: INTERNAL MEDICINE

## 2020-07-24 PROCEDURE — 99999 PR PBB SHADOW E&M-EST. PATIENT-LVL V: ICD-10-PCS | Mod: PBBFAC,,, | Performed by: INTERNAL MEDICINE

## 2020-07-24 PROCEDURE — 3008F BODY MASS INDEX DOCD: CPT | Mod: CPTII,S$GLB,, | Performed by: INTERNAL MEDICINE

## 2020-07-24 PROCEDURE — 99204 PR OFFICE/OUTPT VISIT, NEW, LEVL IV, 45-59 MIN: ICD-10-PCS | Mod: S$GLB,,, | Performed by: INTERNAL MEDICINE

## 2020-07-24 NOTE — PROGRESS NOTES
CC:  Chronic anemia    HPI:  Ms Varma is a 68 yo woman with multiple comorbidities including COPD on 2 liters of NC, CKD stage 3, severe obesity, chronic arthritis, CHF, who is referred by Dr Sahni for further evaluation of chronic anemia. She has been having anemia since at least 2015. Hb stable in the 9-10s range. Hb was 9.8 on 1/20/202, and 9.7 on 7/23/2020, CMV normal 94. CBC on 7/23/20 also showed a normal WBC of 8.32 and platelet count of 298. LDH normal. Vit B12 normal 557. Iron 39, TIBC 262, iron saturation 15%, ferritin normal 97, folate normal 15.1. Haptoglobin was normal at 242 in Dec 2019. SPEP/IFX on 1/20/20 was negative for monoclonal protein. Her screening CT chest on 7/23/20 showed several lung nodules, including a 1.2 cm spiculated RUL pulmonary nodule which measured 0.6 cm on pervious CT. Biopsy is recommended. She also has significant fatty liver on the CT. Patient is scheduled to see pulmonary in August. She is still smoking. She is feeling well. Chronic SOB and arthritic pain.       Past Medical History:   Diagnosis Date    Allergy     Anemia due to gastrointestinal blood loss     LESLY from gastric ulcer due to chronic nsaid use    Anxiety     Arthritis     CKD (chronic kidney disease) stage 3, GFR 30-59 ml/min     followed by [facility] study - recieved labs from 2017 and 2/2018    Gastric ulcer     H/O knee surgery 2001    right    Heart failure with preserved ejection fraction 8/20/2018    Hx of psychiatric care     Hyperlipidemia     Hyponatremia     Psychiatric problem     Therapy         Past Surgical History:   Procedure Laterality Date    BREAST CYST EXCISION Right     1967    cataract surgery      COLONOSCOPY  2015    CORONARY ANGIOGRAPHY N/A 7/20/2018    Procedure: ANGIOGRAM, CORONARY ARTERY;  Surgeon: Willard Roberts MD;  Location: [facility] CATH LAB;  Service: Cardiovascular;  Laterality: N/A;    ESOPHAGOGASTRODUODENOSCOPY  2015    EYE SURGERY  2017    FRACTURE  SURGERY      left femur    JOINT REPLACEMENT  2007    left knee x 2, 2nd performed 2/2 to MRSA infection 3 months after 1st surgery    ORIF FEMUR FRACTURE Left     TUBAL LIGATION         Family History   Problem Relation Age of Onset    Hypertension Mother     Alzheimer's disease Mother     Dementia Mother     Depression Mother         Alzheimers    Myasthenia gravis Father     Arthritis Father     Rectal cancer Father     Hypertension Brother     Arthritis Brother         Colon cancer, obese, high blood pressure    Colon cancer Brother     No Known Problems Son     Cancer Sister         brain    Miscarriages / Stillbirths Sister     Melanoma Neg Hx     Breast cancer Neg Hx        Social History     Socioeconomic History    Marital status: Single     Spouse name: Not on file    Number of children: 2    Years of education: Not on file    Highest education level: Not on file   Occupational History    Occupation: unemployed   Social Needs    Financial resource strain: Somewhat hard    Food insecurity     Worry: Sometimes true     Inability: Sometimes true    Transportation needs     Medical: No     Non-medical: Yes   Tobacco Use    Smoking status: Current Every Day Smoker     Packs/day: 1.50     Years: 50.00     Pack years: 75.00     Types: Cigarettes    Smokeless tobacco: Never Used   Substance and Sexual Activity    Alcohol use: Yes     Alcohol/week: 7.0 standard drinks     Types: 7 Shots of liquor per week     Frequency: 4 or more times a week     Drinks per session: 1 or 2     Binge frequency: Never     Comment: at least 1 cocktail a day    Drug use: No    Sexual activity: Not Currently     Birth control/protection: Abstinence   Lifestyle    Physical activity     Days per week: 0 days     Minutes per session: 0 min    Stress: Only a little   Relationships    Social connections     Talks on phone: Patient refused     Gets together: Three times a week     Attends Jehovah's witness service:  Not on file     Active member of club or organization: No     Attends meetings of clubs or organizations: Never     Relationship status:    Other Topics Concern    Are you pregnant or think you may be? No    Breast-feeding Not Asked    Patient feels they ought to cut down on drinking/drug use Not Asked    Patient annoyed by others criticizing their drinking/drug use Not Asked    Patient has felt bad or guilty about drinking/drug use Not Asked    Patient has had a drink/used drugs as an eye opener in the AM Not Asked   Social History Narrative    Originally from [facility] [place] [facility] in [facility] since [place]    [facility] in [facility]       Review of Systems   Constitutional: Positive for fatigue. Negative for appetite change, chills, fever and unexpected weight change.   HENT: Negative for mouth sores, nosebleeds, tinnitus, trouble swallowing and voice change.    Eyes: Negative for pain, redness and visual disturbance.   Respiratory: Positive for shortness of breath. Negative for cough and wheezing.    Cardiovascular: Negative for chest pain, palpitations and leg swelling.   Gastrointestinal: Negative for abdominal distention, abdominal pain, blood in stool, constipation, diarrhea, nausea and vomiting.   Endocrine: Negative for polydipsia, polyphagia and polyuria.   Genitourinary: Negative for flank pain, frequency and hematuria.   Musculoskeletal: Positive for arthralgias. Negative for back pain, joint swelling, myalgias, neck pain and neck stiffness.   Skin: Positive for wound (over right shin). Negative for color change, pallor and rash.   Neurological: Negative for tremors, seizures, syncope, speech difficulty, weakness, light-headedness, numbness and headaches.   Hematological: Negative for adenopathy. Does not bruise/bleed easily.   Psychiatric/Behavioral: Negative for confusion, dysphoric mood and self-injury. The patient is not nervous/anxious.    All other systems reviewed and are  negative.      Objective:  Physical Exam   Constitutional: She is oriented to person, place, and time. She appears well-developed and well-nourished. No distress.   Wearing oxygen. Sitting in a seated walker   HENT:   Head: Normocephalic and atraumatic.   Mouth/Throat: Oropharynx is clear and moist. No oropharyngeal exudate.   Eyes: Pupils are equal, round, and reactive to light. Conjunctivae and EOM are normal. No scleral icterus.   Neck: Normal range of motion. Neck supple. No JVD present. No thyromegaly present.   Cardiovascular: Normal rate, regular rhythm, normal heart sounds and intact distal pulses. Exam reveals no gallop and no friction rub.   No murmur heard.  Pulmonary/Chest: Effort normal. No respiratory distress. She has no wheezes. She has no rales.   Decreased air entry in both lungs   Abdominal: Soft. Bowel sounds are normal. She exhibits no distension and no mass. There is no hepatosplenomegaly. There is no abdominal tenderness. There is no rebound. No hernia.   Musculoskeletal: Normal range of motion.         General: No tenderness, deformity or edema.   Lymphadenopathy:     She has no cervical adenopathy.        Right: No supraclavicular adenopathy present.        Left: No supraclavicular adenopathy present.   Neurological: She is alert and oriented to person, place, and time. No cranial nerve deficit. She exhibits normal muscle tone.   Skin: Skin is warm and dry. She is not diaphoretic. No erythema. No pallor.   Gauze over the right shin. Patient tore her skin when she fell yesterday   Psychiatric: She has a normal mood and affect. Her behavior is normal. Judgment and thought content normal.   Vitals reviewed.      Labs:  chronic anemia since at least 2015. Hb stable in the 9-10s range. Hb was 9.8 on 1/20/202, and 9.7 on 7/23/2020, CMV normal 94. CBC on 7/23/20 also showed a normal WBC of 8.32 and platelet count of 298. LDH normal. Vit B12 normal 557. Iron 39, TIBC 262, iron saturation 15%,  ferritin normal 97, folate normal 15.1. Haptoglobin was normal at 242 in Dec 2019. SPEP/IFX on 1/20/20 was negative for monoclonal protein.    Imaging Data:  CT chest on 7/23/20 showed several lung nodules, including a 1.2 cm spiculated RUL pulmonary nodule which measured 0.6 cm on pervious CT. Biopsy is recommended. She also has significant fatty liver on the CT.     Assessment and plan:    1. Chronic anemia    2. Chronic respiratory failure with hypoxia    3. Lung nodule      1.  - Ms Varma is a 70 yo woman with multiple medical comorbidities who presents today for further evaluation of chronic anemia  - very comprehensive workup was done in the past, including haptoglobin, SPEP/IFX, folate, B12, ferritin, iron. Results are unremarkable. Repeat workup on 7/23/20 showed normal ferritin, B12, folate levels  - Hb stable  - discussed with patient that she likely has anemia of chronic disease. Explained the pathophysiology of anemia of chronic disease. Discussed the only workup that has not been done is a bone marrow biopsy. However, with her hemoglobin being stable over many years, I recommend continuing to monitor it. If her Hb decreases significantly in the future, we will discuss BMBx. Patient understands and agrees with the plan.   - RTC in 6 months to monitor    2.  - on 2 liters of NC chronically  - monitor    3.  - discussed with patient that she needs to follow up with pulmonary in Aug for a biopsy. Patient understands and agrees with the plan    Her multiple questions were answered in the clinic. Encouraged to call should issues arise.

## 2020-07-24 NOTE — LETTER
July 24, 2020      Yane Sahni MD  [address]  [facility] [address]           [facility] [address]  [address], [address]  [place] [address]  Phone: [phone]          Patient: Nalini Varma   MR Number: [id number]   YOB: 1950   Date of Visit: 7/24/2020       Dear Dr. Yane Sahni:    Thank you for referring Nalini Varma to me for evaluation. Attached you will find relevant portions of my assessment and plan of care.    If you have questions, please do not hesitate to call me. I look forward to following Nalini Varma along with you.    Sincerely,    Jessika Seymour MD    Enclosure  CC:  No Recipients    If you would like to receive this communication electronically, please contact [email or web address] or [phone] to request more information on [employer] access.    For providers and/or their staff who would like to refer a patient to [facility], please contact us through our one-stop-shop provider referral line, [facility], at [phone].    If you feel you have received this communication in error or would no longer like to receive these types of communications, please e-mail [email or web address]

## 2020-07-24 NOTE — PROGRESS NOTES
Subjective:   Patient ID: Nalini Varma is a 69 y.o. female  Chief complaint:   Chief Complaint   Patient presents with    Hypertension     f/u       HPI  Here for HTN    69F with HFpEF (had LHC and RHC 2018), anasarca, HTN, tobacco dep, COPD on 2L home O2, osteoporosis with L3 compression fracture on alendronate and then switched to prolia due to changes in renal function, hx of LESLY 2/2 GIB due to NSAID use, hypoMag, hyponatremia suspected 2/2 SIADH in past,  thyroid nodule: s/p FNA 5/2017 and benign, hx of pulm nodules, OA of shoulders and knees with L knee > Right after surgery followed by ortho outside of [facility] - sx currently managed with norco bid, ambulates with walker and not candidate for knee surgery    Has appt with ortho next week and cardiologist     Slipped when getting into van yesterday when returning home   Right leg ant - she has been keeping area clean - apply triple antibiotic ointment - no redness, inc warmth or drainage     Anemia:  - cause unclear - utd on cscope and egd  - iron stable   - no nsaids   - is taking asa 81   - no gross bleeding   - utd on cscope     - Had virtual visit with gi   Reports last cscope was 5 years ago and reports no scope indicated this year - will revisit in 6 months with him     HTN:  Has not found bp cuff since move   - not checking at hoem consistently   - not unpacked but she thinks that she knows where it is   - here today to also review CT chest results which showed increase size of nodule that requires further evaluation and this is a source of stress     Lung mass:   - following up with pulm asap   - No unexplained weight loss   - No change in baseline cough  - no hemoptysis     Tobacco abuse:   - still smoking   No longer in tob cess prog - may consider in future as ran out transportation   - no wiifii at this time     Diastolic heart failure and HTN, anasarca:   Taking torsemide 40mg once daily (will take additional dose prn weight gain),  losartan 100, amlodipine 10    - following water restriction - 1 liter    - reports chronic ankle edema at baseline   - not checking weight consistently   - has been eating more since pandemic and she attrib weight gain to this and not fluid     COPD on 2L oxygen :   stable   - nelson inhalers   - utd on ct chest as above - needs to f/u with pulm asap - will get opinion   - no new wheezing, sob, fevers or MEDELLIN    Hypomag:   taking supplement every 3 days - causes diarrhea if takes more frequentyly   Other vits every other day     Review of Systems    Objective:  Vitals:    07/24/20 1134   BP: (!) 144/52   BP Location: Left arm   Patient Position: Sitting   Pulse: 106   SpO2: 98%   Weight: 108.3 kg (238 lb 12.1 oz)     Body mass index is 38.54 kg/m².    Physical Exam  Vitals signs reviewed.   Constitutional:       Appearance: Normal appearance. She is well-developed.   HENT:      Head: Normocephalic and atraumatic.   Eyes:      Conjunctiva/sclera: Conjunctivae normal.   Neck:      Musculoskeletal: Normal range of motion and neck supple.   Cardiovascular:      Rate and Rhythm: Normal rate and regular rhythm.   Pulmonary:      Effort: Pulmonary effort is normal.      Breath sounds: Normal breath sounds.   Abdominal:      General: Bowel sounds are normal.      Palpations: Abdomen is soft.   Musculoskeletal:      Comments: Swelling at left knee - no redness or inc warmth  Skin at LE without erythema   No calf ttp   +1 edema at bilateral ankles - stable and improved from prior  Right ant LE with clean based abrasion - no surrounding redness, inc warmth, purulent drainage    Skin:     General: Skin is warm and dry.      Nails: There is no clubbing.     Neurological:      Mental Status: She is alert and oriented to person, place, and time.      Gait: Gait normal.   Psychiatric:         Speech: Speech normal.         Behavior: Behavior normal.         Assessment:  1. Anemia, unspecified type    2. Tobacco abuse    3.  Hypertension, benign    4. Hypomagnesemia    5. Encounter for screening mammogram for malignant neoplasm of breast        Plan:  Nalini was seen today for hypertension.    Diagnoses and all orders for this visit:    Anemia, unspecified type  -     Ambulatory referral/consult to Hematology / Oncology; Future    Tobacco abuse  -     Ambulatory referral/consult to Smoking Cessation Program; Future    Hypertension, benign  -     Comprehensive metabolic panel; Future  -     Magnesium; Future    Hypomagnesemia  -     Magnesium; Future    Encounter for screening mammogram for malignant neoplasm of breast  -     Mammo Digital Screening Bilat w/ Jacob; Future    cont current meds   Inc stress today due to lung mass seen on ct   F/u with pulm asap   Stop smoking! Declines tob cess counseling   She reports knows where bp cuff is and will resume home monitoring - will adjust meds if persistently elevated   - rtc in 1-2 weeks pending for bp check if cannot find home bp cuff   Refer to hematology for further eval of anemia   Reviewed labs with pt today   Labs ordered - needs bmp    Health Maintenance   Topic Date Due    Mammogram  02/20/2021    LDCT Lung Screen  07/23/2021    DEXA SCAN  09/10/2022    Lipid Panel  11/19/2024    TETANUS VACCINE  01/29/2025    Hepatitis C Screening  Completed    Pneumococcal Vaccine (65+ Low/Medium Risk)  Completed

## 2020-07-24 NOTE — PROGRESS NOTES
"Patient was given vaccine information sheet for the Prolia Injection. Prior to administration, the patient's allergies were reviewed. The area of injection was identified in the right upper arm. This area was cleaned with alcohol. Using a 25g 5/8" safety needle, 60mg of Prolia was placed into the subcutaneous tissue. The injection site was dressed with a bandage. Patient experienced no complications and was discharged in stable condition. Patient was given aftercare instructions. Prolia Lot: [id number] Exp: 10/2022      "

## 2020-07-25 NOTE — PROGRESS NOTES
Patient, Nalini Varma (MRN #[id number]), presented with a recorded BMI of 38.54 kg/m^2 and a documented comorbidity(s):  - Hypertension  to which the severe obesity is a contributing factor. This is consistent with the definition of severe obesity (BMI 35.0-39.9) with comorbidity (ICD-10 E66.01, Z68.35). The patient's severe obesity was monitored, evaluated, addressed and/or treated. This addendum to the medical record is made on 07/25/2020.

## 2020-08-04 ENCOUNTER — CLINICAL SUPPORT (OUTPATIENT)
Dept: SMOKING CESSATION | Facility: CLINIC | Age: 70
End: 2020-08-04
Payer: COMMERCIAL

## 2020-08-04 ENCOUNTER — PATIENT OUTREACH (OUTPATIENT)
Dept: OTHER | Facility: OTHER | Age: 70
End: 2020-08-04

## 2020-08-04 DIAGNOSIS — F17.210 HEAVY CIGARETTE SMOKER (20-39 PER DAY): Primary | ICD-10-CM

## 2020-08-04 PROCEDURE — 99404 PREV MED CNSL INDIV APPRX 60: CPT | Mod: S$GLB,,,

## 2020-08-04 PROCEDURE — 99999 PR PBB SHADOW E&M-EST. PATIENT-LVL II: ICD-10-PCS | Mod: PBBFAC,,,

## 2020-08-04 PROCEDURE — 99999 PR PBB SHADOW E&M-EST. PATIENT-LVL II: CPT | Mod: PBBFAC,,,

## 2020-08-04 PROCEDURE — 99404 PR PREVENT COUNSEL,INDIV,60 MIN: ICD-10-PCS | Mod: S$GLB,,,

## 2020-08-04 RX ORDER — MICONAZOLE NITRATE 2 %
2 CREAM (GRAM) TOPICAL
Qty: 170 EACH | Refills: 0 | Status: SHIPPED | OUTPATIENT
Start: 2020-08-04 | End: 2021-03-23

## 2020-08-04 RX ORDER — IBUPROFEN 200 MG
1 TABLET ORAL DAILY
Qty: 28 PATCH | Refills: 0 | Status: SHIPPED | OUTPATIENT
Start: 2020-08-04 | End: 2020-10-12 | Stop reason: SDUPTHER

## 2020-08-04 NOTE — Clinical Note
Patient was seen for tobacco cessation re intake.  Patient will begin on 21 mg nicotine patch daily.  Patient has agreed to weekly follow up.

## 2020-08-06 ENCOUNTER — TELEPHONE (OUTPATIENT)
Dept: INTERNAL MEDICINE | Facility: CLINIC | Age: 70
End: 2020-08-06

## 2020-08-06 DIAGNOSIS — R91.8 LUNG MASS: Primary | ICD-10-CM

## 2020-08-06 DIAGNOSIS — Z72.0 TOBACCO USE: ICD-10-CM

## 2020-08-06 NOTE — TELEPHONE ENCOUNTER
Called and reviewed CT scan with radiology - mass in area that would be best reached by percutaneous bx     - please notify pt that I recommend proceed with biopsy through Interventional radiology   - please call IR and schedule and confirm that order was placed correctly

## 2020-08-06 NOTE — TELEPHONE ENCOUNTER
"Pt currently has follow up appt with pulm   However please let patient know my message below that she should be expected a call from IR to set up biopsy - for now do not cancel pulm appt until biopsy is scheduled     "Called and reviewed CT scan with radiology - mass in area that would be best reached by percutaneous bx      - please notify pt that I recommend proceed with biopsy through Interventional radiology"   "

## 2020-08-11 ENCOUNTER — PATIENT OUTREACH (OUTPATIENT)
Dept: ADMINISTRATIVE | Facility: OTHER | Age: 70
End: 2020-08-11

## 2020-08-12 NOTE — PROGRESS NOTES
Requested updates from Care Everywhere.  Reviewed chart for overdue VIPUL topics.  Updated Health Maintenance.   Reconciled immunizations in LINKS.

## 2020-08-21 ENCOUNTER — OFFICE VISIT (OUTPATIENT)
Dept: CARDIOLOGY | Facility: CLINIC | Age: 70
End: 2020-08-21
Payer: MEDICARE

## 2020-08-21 VITALS
HEIGHT: 67 IN | BODY MASS INDEX: 36.79 KG/M2 | WEIGHT: 234.38 LBS | DIASTOLIC BLOOD PRESSURE: 69 MMHG | SYSTOLIC BLOOD PRESSURE: 142 MMHG | HEART RATE: 107 BPM

## 2020-08-21 DIAGNOSIS — J96.11 CHRONIC RESPIRATORY FAILURE WITH HYPOXIA: ICD-10-CM

## 2020-08-21 DIAGNOSIS — I10 HYPERTENSION, BENIGN: ICD-10-CM

## 2020-08-21 DIAGNOSIS — G89.28 CHRONIC PAIN FOLLOWING SURGERY OR PROCEDURE: ICD-10-CM

## 2020-08-21 DIAGNOSIS — J44.9 CHRONIC OBSTRUCTIVE PULMONARY DISEASE, UNSPECIFIED COPD TYPE: ICD-10-CM

## 2020-08-21 DIAGNOSIS — I50.30 HEART FAILURE WITH PRESERVED EJECTION FRACTION, BORDERLINE, CLASS III: Primary | ICD-10-CM

## 2020-08-21 DIAGNOSIS — E66.9 OBESITY (BMI 35.0-39.9 WITHOUT COMORBIDITY): ICD-10-CM

## 2020-08-21 DIAGNOSIS — E78.5 HYPERLIPIDEMIA, UNSPECIFIED HYPERLIPIDEMIA TYPE: ICD-10-CM

## 2020-08-21 DIAGNOSIS — F17.200 TOBACCO DEPENDENCE: ICD-10-CM

## 2020-08-21 PROCEDURE — 3078F DIAST BP <80 MM HG: CPT | Mod: CPTII,S$GLB,, | Performed by: INTERNAL MEDICINE

## 2020-08-21 PROCEDURE — 99499 UNLISTED E&M SERVICE: CPT | Mod: S$GLB,,, | Performed by: INTERNAL MEDICINE

## 2020-08-21 PROCEDURE — 1159F PR MEDICATION LIST DOCUMENTED IN MEDICAL RECORD: ICD-10-PCS | Mod: S$GLB,,, | Performed by: INTERNAL MEDICINE

## 2020-08-21 PROCEDURE — 3077F SYST BP >= 140 MM HG: CPT | Mod: CPTII,S$GLB,, | Performed by: INTERNAL MEDICINE

## 2020-08-21 PROCEDURE — 99213 PR OFFICE/OUTPT VISIT, EST, LEVL III, 20-29 MIN: ICD-10-PCS | Mod: S$GLB,,, | Performed by: INTERNAL MEDICINE

## 2020-08-21 PROCEDURE — 99999 PR PBB SHADOW E&M-EST. PATIENT-LVL V: CPT | Mod: PBBFAC,,, | Performed by: INTERNAL MEDICINE

## 2020-08-21 PROCEDURE — 3077F PR MOST RECENT SYSTOLIC BLOOD PRESSURE >= 140 MM HG: ICD-10-PCS | Mod: CPTII,S$GLB,, | Performed by: INTERNAL MEDICINE

## 2020-08-21 PROCEDURE — 99999 PR PBB SHADOW E&M-EST. PATIENT-LVL V: ICD-10-PCS | Mod: PBBFAC,,, | Performed by: INTERNAL MEDICINE

## 2020-08-21 PROCEDURE — 1101F PR PT FALLS ASSESS DOC 0-1 FALLS W/OUT INJ PAST YR: ICD-10-PCS | Mod: CPTII,S$GLB,, | Performed by: INTERNAL MEDICINE

## 2020-08-21 PROCEDURE — 1101F PT FALLS ASSESS-DOCD LE1/YR: CPT | Mod: CPTII,S$GLB,, | Performed by: INTERNAL MEDICINE

## 2020-08-21 PROCEDURE — 3008F BODY MASS INDEX DOCD: CPT | Mod: CPTII,S$GLB,, | Performed by: INTERNAL MEDICINE

## 2020-08-21 PROCEDURE — 1125F PR PAIN SEVERITY QUANTIFIED, PAIN PRESENT: ICD-10-PCS | Mod: S$GLB,,, | Performed by: INTERNAL MEDICINE

## 2020-08-21 PROCEDURE — 99499 RISK ADDL DX/OHS AUDIT: ICD-10-PCS | Mod: S$GLB,,, | Performed by: INTERNAL MEDICINE

## 2020-08-21 PROCEDURE — 99213 OFFICE O/P EST LOW 20 MIN: CPT | Mod: S$GLB,,, | Performed by: INTERNAL MEDICINE

## 2020-08-21 PROCEDURE — 3078F PR MOST RECENT DIASTOLIC BLOOD PRESSURE < 80 MM HG: ICD-10-PCS | Mod: CPTII,S$GLB,, | Performed by: INTERNAL MEDICINE

## 2020-08-21 PROCEDURE — 1125F AMNT PAIN NOTED PAIN PRSNT: CPT | Mod: S$GLB,,, | Performed by: INTERNAL MEDICINE

## 2020-08-21 PROCEDURE — 1159F MED LIST DOCD IN RCRD: CPT | Mod: S$GLB,,, | Performed by: INTERNAL MEDICINE

## 2020-08-21 PROCEDURE — 3008F PR BODY MASS INDEX (BMI) DOCUMENTED: ICD-10-PCS | Mod: CPTII,S$GLB,, | Performed by: INTERNAL MEDICINE

## 2020-08-21 NOTE — PROGRESS NOTES
Chart has been dictated using voice recognition software.  It is not been reviewed carefully for any transcriptional errors due to this technology.   Subjective:   Patient ID:  Nalini Varma is a 69 y.o. female who presents for follow-up of No chief complaint on file.      HPI: Patient with  nonischemic heart failure with normal ejection fraction, SIADH, Chronic obstructive pulmonary disease, Hypertension, Tobacco dependence, and Hyperlipidemia. Patient recently found to have a lung mass on CT and is scheduled for a biopsy next week.     Past Medical History:   Diagnosis Date    Allergy     Anemia due to gastrointestinal blood loss     LESLY from gastric ulcer due to chronic nsaid use    Anxiety     Arthritis     CKD (chronic kidney disease) stage 3, GFR 30-59 ml/min     followed by [facility] - recieved labs from 2017 and 2/2018    Gastric ulcer     H/O knee surgery 2001    right    Heart failure with preserved ejection fraction 8/20/2018    Hx of psychiatric care     Hyperlipidemia     Hyponatremia     Psychiatric problem     Therapy        Outpatient Medications Prior to Visit   Medication Sig Dispense Refill    albuterol (VENTOLIN HFA) 90 mcg/actuation inhaler inhale 1-2 puffs as needed every 6 hours for wheezing and shortness of breath 25.5 g 11    amLODIPine (NORVASC) 10 MG tablet Take 1 tablet (10 mg total) by mouth once daily. 90 tablet 3    aspirin (ECOTRIN) 81 MG EC tablet Take 81 mg by mouth once daily.      atorvastatin (LIPITOR) 40 MG tablet TAKE 1 TABLET BY MOUTH EVERY DAILY 90 tablet 3    biotin 1 mg Cap Take by mouth.      cyanocobalamin 1,000 mcg TbSR Take 1,000 mcg by mouth once daily. (Patient taking differently: Take 1,000 mcg by mouth daily as needed. ) 90 tablet 1    denosumab (PROLIA) 60 mg/mL Syrg Inject 1 mL (60 mg total) into the skin every 6 (six) months. 2 mL 0    famotidine (PEPCID) 20 MG tablet TAKE 1 TABLET BY MOUTH TWO TIMES A  tablet 3     fluticasone (FLONASE) 50 mcg/actuation nasal spray 1 spray by Each Nare route 2 (two) times daily as needed for Rhinitis.      fluticasone propion-salmeterol 115-21 mcg/dose (ADVAIR HFA) 115-21 mcg/actuation HFAA inhaler Inhale 2 puffs into the lungs every 12 (twelve) hours. 36 g 3    guaiFENesin (MUCINEX) 600 mg 12 hr tablet Take 1,200 mg by mouth 2 (two) times daily as needed for Congestion.      HYDROcodone-acetaminophen (NORCO)  mg per tablet Take 1 tablet by mouth every 12 hours as needed for pain 60 tablet 0    losartan (COZAAR) 100 MG tablet Take 1 tablet (100 mg total) by mouth once daily. 90 tablet 3    magnesium oxide 400 mg Cap Take 1 capsule by mouth every 12 (twelve) hours.      nicotine (NICODERM CQ) 21 mg/24 hr Place 1 patch onto the skin once daily. 28 patch 0    nicotine, polacrilex, (NICORETTE) 2 mg Gum Take 1 gum (2 mg total) by mouth as needed (Use in place of cigarettes). 170 each 0    potassium chloride SA (K-DUR,KLOR-CON) 10 MEQ tablet Take 1 tablet (10 mEq total) by mouth once daily. 90 tablet 3    torsemide (DEMADEX) 20 MG Tab Take 2 tablets (40 mg total) by mouth once daily. 180 tablet 3    umeclidinium (INCRUSE ELLIPTA) 62.5 mcg/actuation inhalation capsule Inhale 1 puff into the lungs once daily. Controller 90 each 3    vitamin D 1000 units Tab Take 1,000 Units by mouth once daily.      walker (ULTRA-LIGHT ROLLATOR) Misc 1 each by Misc.(Non-Drug; Combo Route) route once daily. 1 each 0     No facility-administered medications prior to visit.        Review of Systems   Constitution: Negative for weight gain and weight loss.   HENT: Negative for nosebleeds.    Eyes: Negative for vision loss in left eye and vision loss in right eye.   Cardiovascular: Negative for claudication.        As above   Respiratory: Negative for hemoptysis, shortness of breath, snoring, sputum production and wheezing.    Endocrine: Negative for polydipsia and polyuria.   Hematologic/Lymphatic: Does  "not bruise/bleed easily.   Musculoskeletal: Positive for arthritis (left shoulder and both knees). Negative for myalgias.   Gastrointestinal: Negative for change in bowel habit, hematemesis, hematochezia, melena, nausea and vomiting.   Genitourinary: Negative for hematuria.   Neurological: Negative for focal weakness and numbness.      Objective:   Physical Exam   Constitutional: She appears well-developed and well-nourished. Nasal cannula in place.   Limited examination with the patient sitting in a chair and clothed   BP (!) 142/69 (BP Location: Left arm, Patient Position: Sitting, BP Method: X-Large (Automatic))   Pulse 107   Ht 5' 6.5" (1.689 m)   Wt 106.3 kg (234 lb 5.6 oz)   LMP  (Exact Date)   BMI 37.26 kg/m²   Mildly obese     Neck: No JVD (At 90 degrees) present.   Cardiovascular:   Murmur heard.   Harsh midsystolic murmur is present with a grade of 2/6 at the upper right sternal border radiating to the neck.  Pulmonary/Chest: She has no wheezes. She has no rales.   Musculoskeletal:         General: No edema.         Lab Results   Component Value Date     01/09/2020    K 4.5 01/09/2020    BUN 18 01/09/2020    CREATININE 1.3 01/09/2020    GLU 98 01/09/2020    HGBA1C 5.3 12/11/2018    CHOL 147 11/19/2019    HDL 50 11/19/2019    LDLCALC 68.6 11/19/2019    TRIG 142 11/19/2019    CHOLHDL 34.0 11/19/2019    HGB 9.7 (L) 07/23/2020    HCT 30.1 (L) 07/23/2020     07/23/2020    INR 0.9 12/10/2018       Assessment:     1. Heart failure with preserved ejection fraction, borderline, class III    2. Chronic obstructive pulmonary disease, unspecified COPD type    3. Hypertension, benign    4. Tobacco dependence    5. Hyperlipidemia, unspecified hyperlipidemia type    6. Chronic respiratory failure with hypoxia    7. Obesity (BMI 35.0-39.9 without comorbidity)    8. Chronic pain following surgery or procedure      Patient is stable from a cardiovascular point of view with no evidence of ischemia or " significant arrhythmias.  The patient has consistent dyspnea on exertion due to her severe COPD but without evidence congestive heart failure.  The patient has no evidence of ongoing vascular disease.  Therefore,when aspirin is discontinued for her upcoming lung mass biopsy, it is not need to be restarted. The benefit of aspirin in this patient does not outweigh the risk as determined in the ASPREE (N Engl J Med 2018;379:1519-28), ASCEND (N Engl J Med 2018;379:1529-39), and ARRIVE (Lancet 2018; 392: 1036-46) trials.  No change in her medications are needed at this time. Unless there are intervening problems, patient should return for re-evaluation in 6 months.      Plan:     Diagnoses and all orders for this visit:    Heart failure with preserved ejection fraction, borderline, class III    Chronic obstructive pulmonary disease, unspecified COPD type    Hypertension, benign    Tobacco dependence    Hyperlipidemia, unspecified hyperlipidemia type    Chronic respiratory failure with hypoxia    Obesity (BMI 35.0-39.9 without comorbidity)    Chronic pain following surgery or procedure          Parvez Ortez MD  Consultative Cardiology

## 2020-08-24 DIAGNOSIS — Z01.812 PRE-PROCEDURE LAB EXAM: ICD-10-CM

## 2020-09-01 ENCOUNTER — HOSPITAL ENCOUNTER (OUTPATIENT)
Dept: PREADMISSION TESTING | Facility: OTHER | Age: 70
Discharge: HOME | End: 2020-09-01
Attending: RADIOLOGY
Payer: MEDICARE

## 2020-09-01 ENCOUNTER — HOSPITAL ENCOUNTER (OUTPATIENT)
Dept: RADIOLOGY | Facility: OTHER | Age: 70
Discharge: HOME | End: 2020-09-01
Attending: INTERNAL MEDICINE
Payer: MEDICARE

## 2020-09-01 DIAGNOSIS — Z12.31 ENCOUNTER FOR SCREENING MAMMOGRAM FOR MALIGNANT NEOPLASM OF BREAST: ICD-10-CM

## 2020-09-01 DIAGNOSIS — Z01.812 PRE-PROCEDURE LAB EXAM: ICD-10-CM

## 2020-09-01 PROCEDURE — U0003 INFECTIOUS AGENT DETECTION BY NUCLEIC ACID (DNA OR RNA); SEVERE ACUTE RESPIRATORY SYNDROME CORONAVIRUS 2 (SARS-COV-2) (CORONAVIRUS DISEASE [COVID-19]), AMPLIFIED PROBE TECHNIQUE, MAKING USE OF HIGH THROUGHPUT TECHNOLOGIES AS DESCRIBED BY CMS-2020-01-R: HCPCS

## 2020-09-01 PROCEDURE — 77063 MAMMO DIGITAL SCREENING BILAT WITH TOMOSYNTHESIS_CAD: ICD-10-PCS | Mod: 26,,, | Performed by: RADIOLOGY

## 2020-09-01 PROCEDURE — 77067 MAMMO DIGITAL SCREENING BILAT WITH TOMOSYNTHESIS_CAD: ICD-10-PCS | Mod: 26,,, | Performed by: RADIOLOGY

## 2020-09-01 PROCEDURE — 77063 BREAST TOMOSYNTHESIS BI: CPT | Mod: 26,,, | Performed by: RADIOLOGY

## 2020-09-01 PROCEDURE — 77067 SCR MAMMO BI INCL CAD: CPT | Mod: 26,,, | Performed by: RADIOLOGY

## 2020-09-01 PROCEDURE — 77067 SCR MAMMO BI INCL CAD: CPT | Mod: TC

## 2020-09-02 ENCOUNTER — PATIENT MESSAGE (OUTPATIENT)
Dept: INTERNAL MEDICINE | Facility: CLINIC | Age: 70
End: 2020-09-02

## 2020-09-02 DIAGNOSIS — G89.4 CHRONIC PAIN DISORDER: ICD-10-CM

## 2020-09-02 LAB — SARS-COV-2 RNA RESP QL NAA+PROBE: NOT DETECTED

## 2020-09-03 ENCOUNTER — HOSPITAL ENCOUNTER (OUTPATIENT)
Dept: PULMONOLOGY | Facility: CLINIC | Age: 70
Discharge: HOME | DRG: 181 | End: 2020-09-03
Payer: MEDICARE

## 2020-09-03 ENCOUNTER — OFFICE VISIT (OUTPATIENT)
Dept: PULMONOLOGY | Facility: CLINIC | Age: 70
DRG: 181 | End: 2020-09-03
Payer: MEDICARE

## 2020-09-03 VITALS
HEIGHT: 60 IN | WEIGHT: 229.5 LBS | DIASTOLIC BLOOD PRESSURE: 72 MMHG | OXYGEN SATURATION: 95 % | SYSTOLIC BLOOD PRESSURE: 140 MMHG | HEART RATE: 104 BPM | BODY MASS INDEX: 45.06 KG/M2

## 2020-09-03 DIAGNOSIS — J41.0 SIMPLE CHRONIC BRONCHITIS: Primary | ICD-10-CM

## 2020-09-03 DIAGNOSIS — J41.0 SIMPLE CHRONIC BRONCHITIS: ICD-10-CM

## 2020-09-03 DIAGNOSIS — Z99.81 ON HOME OXYGEN THERAPY: ICD-10-CM

## 2020-09-03 DIAGNOSIS — F17.200 TOBACCO DEPENDENCE: ICD-10-CM

## 2020-09-03 DIAGNOSIS — Z72.0 TOBACCO ABUSE: ICD-10-CM

## 2020-09-03 DIAGNOSIS — R91.1 PULMONARY NODULE: ICD-10-CM

## 2020-09-03 DIAGNOSIS — R93.89 ABNORMAL CT OF THE CHEST: ICD-10-CM

## 2020-09-03 PROCEDURE — 99999 PR PBB SHADOW E&M-EST. PATIENT-LVL V: CPT | Mod: PBBFAC,,, | Performed by: NURSE PRACTITIONER

## 2020-09-03 PROCEDURE — 99214 OFFICE O/P EST MOD 30 MIN: CPT | Mod: S$GLB,,, | Performed by: NURSE PRACTITIONER

## 2020-09-03 PROCEDURE — 3008F PR BODY MASS INDEX (BMI) DOCUMENTED: ICD-10-PCS | Mod: CPTII,S$GLB,, | Performed by: NURSE PRACTITIONER

## 2020-09-03 PROCEDURE — 1126F AMNT PAIN NOTED NONE PRSNT: CPT | Mod: S$GLB,,, | Performed by: NURSE PRACTITIONER

## 2020-09-03 PROCEDURE — 94727 PR PULM FUNCTION TEST BY GAS: ICD-10-PCS | Mod: S$GLB,,, | Performed by: INTERNAL MEDICINE

## 2020-09-03 PROCEDURE — 99999 PR PBB SHADOW E&M-EST. PATIENT-LVL V: ICD-10-PCS | Mod: PBBFAC,,, | Performed by: NURSE PRACTITIONER

## 2020-09-03 PROCEDURE — 94060 EVALUATION OF WHEEZING: CPT | Mod: S$GLB,,, | Performed by: INTERNAL MEDICINE

## 2020-09-03 PROCEDURE — 94729 PR C02/MEMBANE DIFFUSE CAPACITY: ICD-10-PCS | Mod: S$GLB,,, | Performed by: INTERNAL MEDICINE

## 2020-09-03 PROCEDURE — 99214 PR OFFICE/OUTPT VISIT, EST, LEVL IV, 30-39 MIN: ICD-10-PCS | Mod: S$GLB,,, | Performed by: NURSE PRACTITIONER

## 2020-09-03 PROCEDURE — 94060 PR EVAL OF BRONCHOSPASM: ICD-10-PCS | Mod: S$GLB,,, | Performed by: INTERNAL MEDICINE

## 2020-09-03 PROCEDURE — 3078F PR MOST RECENT DIASTOLIC BLOOD PRESSURE < 80 MM HG: ICD-10-PCS | Mod: CPTII,S$GLB,, | Performed by: NURSE PRACTITIONER

## 2020-09-03 PROCEDURE — 3077F SYST BP >= 140 MM HG: CPT | Mod: CPTII,S$GLB,, | Performed by: NURSE PRACTITIONER

## 2020-09-03 PROCEDURE — 1101F PT FALLS ASSESS-DOCD LE1/YR: CPT | Mod: CPTII,S$GLB,, | Performed by: NURSE PRACTITIONER

## 2020-09-03 PROCEDURE — 3008F BODY MASS INDEX DOCD: CPT | Mod: CPTII,S$GLB,, | Performed by: NURSE PRACTITIONER

## 2020-09-03 PROCEDURE — 1159F MED LIST DOCD IN RCRD: CPT | Mod: S$GLB,,, | Performed by: NURSE PRACTITIONER

## 2020-09-03 PROCEDURE — 94729 DIFFUSING CAPACITY: CPT | Mod: S$GLB,,, | Performed by: INTERNAL MEDICINE

## 2020-09-03 PROCEDURE — 1101F PR PT FALLS ASSESS DOC 0-1 FALLS W/OUT INJ PAST YR: ICD-10-PCS | Mod: CPTII,S$GLB,, | Performed by: NURSE PRACTITIONER

## 2020-09-03 PROCEDURE — 3078F DIAST BP <80 MM HG: CPT | Mod: CPTII,S$GLB,, | Performed by: NURSE PRACTITIONER

## 2020-09-03 PROCEDURE — 1159F PR MEDICATION LIST DOCUMENTED IN MEDICAL RECORD: ICD-10-PCS | Mod: S$GLB,,, | Performed by: NURSE PRACTITIONER

## 2020-09-03 PROCEDURE — 3077F PR MOST RECENT SYSTOLIC BLOOD PRESSURE >= 140 MM HG: ICD-10-PCS | Mod: CPTII,S$GLB,, | Performed by: NURSE PRACTITIONER

## 2020-09-03 PROCEDURE — 1126F PR PAIN SEVERITY QUANTIFIED, NO PAIN PRESENT: ICD-10-PCS | Mod: S$GLB,,, | Performed by: NURSE PRACTITIONER

## 2020-09-03 PROCEDURE — 94727 GAS DIL/WSHOT DETER LNG VOL: CPT | Mod: S$GLB,,, | Performed by: INTERNAL MEDICINE

## 2020-09-03 RX ORDER — HYDROCODONE BITARTRATE AND ACETAMINOPHEN 10; 325 MG/1; MG/1
1 TABLET ORAL EVERY 12 HOURS
Qty: 60 TABLET | Refills: 0 | Status: SHIPPED | OUTPATIENT
Start: 2020-09-03 | End: 2020-10-01 | Stop reason: SDUPTHER

## 2020-09-03 NOTE — ASSESSMENT & PLAN NOTE
Explain biopsy process in greater detail (to best of my ability).  Explained risks of lung collapse, bleeding and infection. Considering that she is still smoking and these nodules are growing I highly recommend that she proceed with biopsy.

## 2020-09-03 NOTE — PROGRESS NOTES
Patient, Nalini Varma (MRN #[id number]), presented with a recorded BMI of 44.82 kg/m^2 consistent with the definition of morbid obesity (ICD-10 E66.01). The patient's morbid obesity was monitored, evaluated, addressed and/or treated. This addendum to the medical record is made on 09/03/2020.

## 2020-09-03 NOTE — PATIENT INSTRUCTIONS
I will discuss your case with one of my staff doctors    Keep taking all medications as you have    Keep up the good work cutting back on smoking!    We will get breathing tests on you!

## 2020-09-03 NOTE — LETTER
September 3, 2020      Yane Sahni MD  [address]  [facility] [address]           [facility] [address]  [address]  [facility] [address]  Phone: [phone]          Patient: Nalini Varma   MR Number: [id number]   YOB: 1950   Date of Visit: 9/3/2020       Dear Dr. Yane Sahni:    Thank you for referring Nalini Varma to me for evaluation. Attached you will find relevant portions of my assessment and plan of care.    If you have questions, please do not hesitate to call me. I look forward to following Nalini Varma along with you.    Sincerely,    Vivian Dennis, DNP    Enclosure  CC:  No Recipients    If you would like to receive this communication electronically, please contact [email or web address] or [phone] to request more information on [employer] access.    For providers and/or their staff who would like to refer a patient to [facility], please contact us through our one-stop-shop provider referral line, [facility], at [phone].    If you feel you have received this communication in error or would no longer like to receive these types of communications, please e-mail [email or web address]

## 2020-09-03 NOTE — PROGRESS NOTES
Subjective:       Patient ID: Nalini Varma is a 69 y.o. female.    Chief Complaint: Pulmonary nodules  HPI:   Nalini Varma is a 69 y.o. female who presents with evaluation prior to lung biopsy.   Here for pulmonary opinion- Has an IR biopsy that is scheduled for 9/4/20.  She has been followed for lung nodules in our clinic.  She was seen by Dr. Zamora and Dr. Parker but is new to me.   Has been on supp O2 for a couple of years  Down to 3/4 of a pack of cigarettes daily, this is her third time around with smoking cessation   Rarely uses albuterol  Uses Advair and Incruse and feels rather good      Review of Systems   Constitutional: Positive for weight loss. Negative for fever, chills, activity change, fatigue, night sweats and weakness.   HENT: Positive for postnasal drip. Negative for rhinorrhea, trouble swallowing and congestion.    Eyes: Negative for itching.   Respiratory: Positive for sputum production, shortness of breath, wheezing and dyspnea on extertion. Negative for cough, hemoptysis, choking, chest tightness and use of rescue inhaler.    Cardiovascular: Negative for chest pain and palpitations.   Genitourinary: Negative for difficulty urinating.   Endocrine: Negative for cold intolerance and heat intolerance.    Musculoskeletal: Negative for arthralgias.   Skin: Negative for rash.   Gastrointestinal: Negative for nausea, vomiting and acid reflux.   Neurological: Negative for dizziness and light-headedness.   Hematological: Negative for adenopathy.   Psychiatric/Behavioral: Negative for sleep disturbance.         Social History     Tobacco Use    Smoking status: Current Every Day Smoker     Packs/day: 1.50     Years: 50.00     Pack years: 75.00     Types: Cigarettes    Smokeless tobacco: Never Used   Substance Use Topics    Alcohol use: Yes     Alcohol/week: 7.0 standard drinks     Types: 7 Shots of liquor per week     Frequency: 4 or more times a week     Drinks per session: 1 or 2     Binge  frequency: Never     Comment: at least 1 cocktail a day       Review of patient's allergies indicates:   Allergen Reactions    Wellbutrin [bupropion hcl] Other (See Comments)     Hyponatremia and hypomagnesia     Zoloft [sertraline] Other (See Comments)     Hyponatremia and hypomagnesia      Past Medical History:   Diagnosis Date    Allergy     Anemia due to gastrointestinal blood loss     LESLY from gastric ulcer due to chronic nsaid use    Anxiety     Arthritis     CKD (chronic kidney disease) stage 3, GFR 30-59 ml/min     followed by [facility] CRI study - recieved labs from 2017 and 2/2018    Gastric ulcer     H/O knee surgery 2001    right    Heart failure with preserved ejection fraction 8/20/2018    Hx of psychiatric care     Hyperlipidemia     Hyponatremia     Psychiatric problem     Therapy      Past Surgical History:   Procedure Laterality Date    BREAST CYST EXCISION Right     1967    cataract surgery      COLONOSCOPY  2015    CORONARY ANGIOGRAPHY N/A 7/20/2018    Procedure: ANGIOGRAM, CORONARY ARTERY;  Surgeon: Willard Roberts MD;  Location: [facility] CATH LAB;  Service: Cardiovascular;  Laterality: N/A;    ESOPHAGOGASTRODUODENOSCOPY  2015    EYE SURGERY  2017    FRACTURE SURGERY      left femur    JOINT REPLACEMENT  2007    left knee x 2, 2nd performed 2/2 to MRSA infection 3 months after 1st surgery    ORIF FEMUR FRACTURE Left     TUBAL LIGATION       Current Outpatient Medications on File Prior to Visit   Medication Sig    albuterol (VENTOLIN HFA) 90 mcg/actuation inhaler inhale 1-2 puffs as needed every 6 hours for wheezing and shortness of breath    amLODIPine (NORVASC) 10 MG tablet Take 1 tablet (10 mg total) by mouth once daily.    aspirin (ECOTRIN) 81 MG EC tablet Take 81 mg by mouth once daily.    atorvastatin (LIPITOR) 40 MG tablet TAKE 1 TABLET BY MOUTH EVERY DAILY    biotin 1 mg Cap Take by mouth.    cyanocobalamin 1,000 mcg TbSR Take 1,000 mcg by mouth once daily.  (Patient taking differently: Take 1,000 mcg by mouth daily as needed. )    denosumab (PROLIA) 60 mg/mL Syrg Inject 1 mL (60 mg total) into the skin every 6 (six) months.    famotidine (PEPCID) 20 MG tablet TAKE 1 TABLET BY MOUTH TWO TIMES A DAY    fluticasone (FLONASE) 50 mcg/actuation nasal spray 1 spray by Each Nare route 2 (two) times daily as needed for Rhinitis.    fluticasone propion-salmeterol 115-21 mcg/dose (ADVAIR HFA) 115-21 mcg/actuation HFAA inhaler Inhale 2 puffs into the lungs every 12 (twelve) hours.    guaiFENesin (MUCINEX) 600 mg 12 hr tablet Take 1,200 mg by mouth 2 (two) times daily as needed for Congestion.    HYDROcodone-acetaminophen (NORCO)  mg per tablet Take 1 tablet by mouth every 12 hours as needed for pain    losartan (COZAAR) 100 MG tablet Take 1 tablet (100 mg total) by mouth once daily.    magnesium oxide 400 mg Cap Take 1 capsule by mouth every 12 (twelve) hours.    nicotine (NICODERM CQ) 21 mg/24 hr Place 1 patch onto the skin once daily.    nicotine, polacrilex, (NICORETTE) 2 mg Gum Take 1 gum (2 mg total) by mouth as needed (Use in place of cigarettes).    potassium chloride SA (K-DUR,KLOR-CON) 10 MEQ tablet Take 1 tablet (10 mEq total) by mouth once daily.    torsemide (DEMADEX) 20 MG Tab Take 2 tablets (40 mg total) by mouth once daily.    umeclidinium (INCRUSE ELLIPTA) 62.5 mcg/actuation inhalation capsule Inhale 1 puff into the lungs once daily. Controller    vitamin D 1000 units Tab Take 1,000 Units by mouth once daily.    walker (ULTRA-LIGHT ROLLATOR) Misc 1 each by Misc.(Non-Drug; Combo Route) route once daily.    [DISCONTINUED] HYDROcodone-acetaminophen (NORCO)  mg per tablet Take 1 tablet by mouth every 12 hours as needed for pain     No current facility-administered medications on file prior to visit.        Objective:      Vitals:    09/03/20 1157   BP: (!) 140/72   Pulse: 104     Physical Exam   Constitutional: She is oriented to person,  place, and time. She appears well-developed and well-nourished. No distress.   On supplemental oxygen She is obese.   HENT:   Head: Normocephalic.   Neck: Normal range of motion. Neck supple.   Cardiovascular: Normal rate and regular rhythm.   No murmur heard.  Pulmonary/Chest: Normal expansion, symmetric chest wall expansion and effort normal. No respiratory distress. She has decreased breath sounds. She has no wheezes. She has no rhonchi. She has no rales.   Abdominal: Soft. She exhibits no distension. There is no hepatosplenomegaly. There is no abdominal tenderness.   Musculoskeletal: Normal range of motion.         General: No edema.   Lymphadenopathy:     She has no cervical adenopathy.   Neurological: She is alert and oriented to person, place, and time. Gait normal.   Skin: Skin is warm and dry. No cyanosis. Nails show no clubbing.   Psychiatric: She has a normal mood and affect.   Vitals reviewed.    Personal Diagnostic Review    Imaging personally reviewed with patient CT 7/23/2020, 3/20/2019  PFTs personally reviewed and discussed with patient          Assessment:     Problem List Items Addressed This Visit        Pulmonary    Chronic obstructive pulmonary disease - Primary    Overview     Emphysema noted on CT  Some mixed restriction and obstruction noted on PFTs, DLCO slightly decreased. Restriction in TLC, no air trapping noted. PFTs rather stable overall  Stable on Advair and Incruse.  Scant needs for PRN SEDRICK         Current Assessment & Plan     Continue as now         Relevant Orders    Spirometry with/without bronchodilator (Completed)    DLCO-Carbon Monoxide Diffusing Capacity    LUNG VOLUMES    Pulmonary nodule    Overview      0.6 cm right upper lobe noncalcified pulmonary nodule which measured 0.4 cm on previous CT dated 03/20/2019.  1.2 cm pleural based pulmonary nodule within the right upper lobe which measured 0.9 cm on previous CT.  1.2 cm partially spiculated right upper lobe noncalcified  pulmonary nodule which measured 0.6 cm on previous CT.    Has IR biopsy scheduled for 9/4/20.  Recommend that she proceed with this as the nodules have changed since last scan         Current Assessment & Plan     Explain biopsy process in greater detail (to best of my ability).  Explained risks of lung collapse, bleeding and infection. Considering that she is still smoking and these nodules are growing I highly recommend that she proceed with biopsy.           On home oxygen therapy    Overview     Stable on 3 L per NC, wears pretty much around the clock         Current Assessment & Plan     Continue as now            Other    Tobacco dependence    Overview     Down to 3/4 pack daily  Encourage her to quit entirely today!         Current Assessment & Plan     As now.           Other Visit Diagnoses     Abnormal CT of the chest        Tobacco abuse            Phone call placed to patient to discuss proceeding with biopsy as scheduled and rather stable PFTs. Detailed VM left for patient.

## 2020-09-04 ENCOUNTER — HOSPITAL ENCOUNTER (OUTPATIENT)
Dept: INTERVENTIONAL RADIOLOGY/VASCULAR | Facility: OTHER | Age: 70
Discharge: HOME | End: 2020-09-04
Attending: INTERNAL MEDICINE
Payer: MEDICARE

## 2020-09-04 ENCOUNTER — HOSPITAL ENCOUNTER (INPATIENT)
Facility: OTHER | Age: 70
LOS: 1 days | Discharge: HOME | DRG: 181 | End: 2020-09-04
Attending: RADIOLOGY | Admitting: RADIOLOGY
Payer: MEDICARE

## 2020-09-04 ENCOUNTER — HOSPITAL ENCOUNTER (OUTPATIENT)
Dept: RADIOLOGY | Facility: OTHER | Age: 70
Discharge: HOME | End: 2020-09-04
Attending: INTERNAL MEDICINE

## 2020-09-04 VITALS
OXYGEN SATURATION: 100 % | WEIGHT: 232 LBS | HEART RATE: 109 BPM | BODY MASS INDEX: 45.55 KG/M2 | SYSTOLIC BLOOD PRESSURE: 123 MMHG | HEIGHT: 60 IN | RESPIRATION RATE: 18 BRPM | TEMPERATURE: 98 F | DIASTOLIC BLOOD PRESSURE: 70 MMHG

## 2020-09-04 DIAGNOSIS — R91.8 MULTIPLE LUNG NODULES: ICD-10-CM

## 2020-09-04 DIAGNOSIS — R91.8 LUNG MASS: ICD-10-CM

## 2020-09-04 DIAGNOSIS — Z72.0 TOBACCO USE: ICD-10-CM

## 2020-09-04 DIAGNOSIS — R91.8 LUNG MASS: Primary | ICD-10-CM

## 2020-09-04 DIAGNOSIS — J44.9 CHRONIC OBSTRUCTIVE PULMONARY DISEASE, UNSPECIFIED COPD TYPE: Primary | ICD-10-CM

## 2020-09-04 LAB
APTT BLDCRRT: 30.8 SEC (ref 21–32)
INR PPP: 0.9 (ref 0.8–1.2)
PROTHROMBIN TIME: 9.7 SEC (ref 9–12.5)

## 2020-09-04 PROCEDURE — 88342 IMHCHEM/IMCYTCHM 1ST ANTB: CPT | Performed by: PATHOLOGY

## 2020-09-04 PROCEDURE — 88333 PR  INTRAOPERATIVE CYTO PATH CONSULT, INITIAL SITE: ICD-10-PCS | Mod: 26,,, | Performed by: PATHOLOGY

## 2020-09-04 PROCEDURE — 36415 COLL VENOUS BLD VENIPUNCTURE: CPT

## 2020-09-04 PROCEDURE — 88333 PATH CONSLTJ SURG CYTO XM 1: CPT | Performed by: PATHOLOGY

## 2020-09-04 PROCEDURE — 88360 TUMOR IMMUNOHISTOCHEM/MANUAL: CPT | Performed by: PATHOLOGY

## 2020-09-04 PROCEDURE — 85610 PROTHROMBIN TIME: CPT

## 2020-09-04 PROCEDURE — 99152 MOD SED SAME PHYS/QHP 5/>YRS: CPT | Performed by: RADIOLOGY

## 2020-09-04 PROCEDURE — 88274 CYTOGENETICS 25-99: CPT | Performed by: PATHOLOGY

## 2020-09-04 PROCEDURE — 85730 THROMBOPLASTIN TIME PARTIAL: CPT

## 2020-09-04 PROCEDURE — 63600175 PHARM REV CODE 636 W HCPCS: Performed by: RADIOLOGY

## 2020-09-04 PROCEDURE — 88271 CYTOGENETICS DNA PROBE: CPT | Mod: 59 | Performed by: PATHOLOGY

## 2020-09-04 PROCEDURE — 88305 TISSUE EXAM BY PATHOLOGIST: CPT | Mod: 26,,, | Performed by: PATHOLOGY

## 2020-09-04 PROCEDURE — 99153 MOD SED SAME PHYS/QHP EA: CPT | Performed by: RADIOLOGY

## 2020-09-04 PROCEDURE — 88333 PATH CONSLTJ SURG CYTO XM 1: CPT | Mod: 26,,, | Performed by: PATHOLOGY

## 2020-09-04 PROCEDURE — 81235 EGFR GENE COM VARIANTS: CPT | Performed by: PATHOLOGY

## 2020-09-04 PROCEDURE — 88341 IMHCHEM/IMCYTCHM EA ADD ANTB: CPT | Mod: 26,,, | Performed by: PATHOLOGY

## 2020-09-04 PROCEDURE — 88381 MICRODISSECTION MANUAL: CPT | Mod: 59 | Performed by: PATHOLOGY

## 2020-09-04 PROCEDURE — 88342 CHG IMMUNOCYTOCHEMISTRY: ICD-10-PCS | Mod: 26,,, | Performed by: PATHOLOGY

## 2020-09-04 PROCEDURE — 88305 TISSUE EXAM BY PATHOLOGIST: ICD-10-PCS | Mod: 26,,, | Performed by: PATHOLOGY

## 2020-09-04 PROCEDURE — 88341 PR IHC OR ICC EACH ADD'L SINGLE ANTIBODY  STAINPR: ICD-10-PCS | Mod: 26,,, | Performed by: PATHOLOGY

## 2020-09-04 PROCEDURE — 88342 IMHCHEM/IMCYTCHM 1ST ANTB: CPT | Mod: 26,,, | Performed by: PATHOLOGY

## 2020-09-04 PROCEDURE — 12000002 HC ACUTE/MED SURGE SEMI-PRIVATE ROOM

## 2020-09-04 PROCEDURE — 88271 CYTOGENETICS DNA PROBE: CPT | Performed by: PATHOLOGY

## 2020-09-04 PROCEDURE — 88341 IMHCHEM/IMCYTCHM EA ADD ANTB: CPT | Performed by: PATHOLOGY

## 2020-09-04 PROCEDURE — 88305 TISSUE EXAM BY PATHOLOGIST: CPT | Performed by: PATHOLOGY

## 2020-09-04 PROCEDURE — 81210 BRAF GENE: CPT | Performed by: PATHOLOGY

## 2020-09-04 RX ORDER — MIDAZOLAM HYDROCHLORIDE 1 MG/ML
INJECTION INTRAMUSCULAR; INTRAVENOUS
Status: DISCONTINUED | OUTPATIENT
Start: 2020-09-04 | End: 2020-09-08 | Stop reason: HOSPADM

## 2020-09-04 RX ORDER — FENTANYL CITRATE 50 UG/ML
INJECTION, SOLUTION INTRAMUSCULAR; INTRAVENOUS
Status: DISCONTINUED | OUTPATIENT
Start: 2020-09-04 | End: 2020-09-08 | Stop reason: HOSPADM

## 2020-09-04 NOTE — OP NOTE
[facility] [address] Check[facility]  Interventional Radiology  High Risk Procedure - Outpatient    Date: 09/04/2020 Time: 3:13 PM    Pre-Op Diagnosis: Lung nodule    Post-Op Diagnosis: same    Procedure Performed by: Ranjit Starks MD    Assistant: none    Procedure: CT guided lung biopsy    Specimen/Tissue Removed: 5 x 20 gauge cores    Estimated Blood Loss: Less than 5 mL    Procedure Note/Findings: CT guided biopsy of RUL lung nodule via posterior approach with 19 gauge coaxial needle with 5 x 20 gauge cores obtained. No immediate post-procedure complications noted.            Please refer to dictated report for additional details.

## 2020-09-04 NOTE — PLAN OF CARE
Patient prefers to have no one present for discharge.  Patient taking medical transportation home.

## 2020-09-04 NOTE — H&P
[facility] 1stFl CheckIn  History & Physical - Short Stay  Interventional Radiology    SUBJECTIVE:     Chief Complaint/Reason for Admission: Right lung nodule    Informant(s):  self and Electronic Health Record    History of Present Illness:  Nalini Varma is a 69 y.o. female with a history of enlarging lung nodules.    Patient presents for CT guided biopsy of lung nodule.    Scheduled Meds:   Continuous Infusions:   PRN Meds:     Review of patient's allergies indicates:   Allergen Reactions    Wellbutrin [bupropion hcl] Other (See Comments)     Hyponatremia and hypomagnesia     Zoloft [sertraline] Other (See Comments)     Hyponatremia and hypomagnesia        Past Medical History:   Diagnosis Date    Allergy     Anemia due to gastrointestinal blood loss     LESLY from gastric ulcer due to chronic nsaid use    Anxiety     Arthritis     CKD (chronic kidney disease) stage 3, GFR 30-59 ml/min     followed by [facility] CRI study - recieved labs from 2017 and 2/2018    Gastric ulcer     H/O knee surgery 2001    right    Heart failure with preserved ejection fraction 8/20/2018    Hx of psychiatric care     Hyperlipidemia     Hyponatremia     Psychiatric problem     Therapy      Past Surgical History:   Procedure Laterality Date    BREAST CYST EXCISION Right     1967    cataract surgery      COLONOSCOPY  2015    CORONARY ANGIOGRAPHY N/A 7/20/2018    Procedure: ANGIOGRAM, CORONARY ARTERY;  Surgeon: Willard Roberts MD;  Location: [facility] CATH LAB;  Service: Cardiovascular;  Laterality: N/A;    ESOPHAGOGASTRODUODENOSCOPY  2015    EYE SURGERY  2017    FRACTURE SURGERY      left femur    JOINT REPLACEMENT  2007    left knee x 2, 2nd performed 2/2 to MRSA infection 3 months after 1st surgery    ORIF FEMUR FRACTURE Left     TUBAL LIGATION       Family History   Problem Relation Age of Onset    Hypertension Mother     Alzheimer's disease Mother     Dementia Mother     Depression Mother          Alzheimers    Myasthenia gravis Father     Arthritis Father     Rectal cancer Father     Hypertension Brother     Arthritis Brother         Colon cancer, obese, high blood pressure    Colon cancer Brother     No Known Problems Son     Cancer Sister         brain    Miscarriages / Stillbirths Sister     Melanoma Neg Hx     Breast cancer Neg Hx      Social History     Tobacco Use    Smoking status: Current Every Day Smoker     Packs/day: 1.50     Years: 50.00     Pack years: 75.00     Types: Cigarettes    Smokeless tobacco: Never Used   Substance Use Topics    Alcohol use: Yes     Alcohol/week: 7.0 standard drinks     Types: 7 Shots of liquor per week     Frequency: 4 or more times a week     Drinks per session: 1 or 2     Binge frequency: Never     Comment: at least 1 cocktail a day    Drug use: No        Review of Systems:  ROS not obtained.    OBJECTIVE:     Vital Signs (Most Recent):  Temp: 97.4 °F (36.3 °C) (09/04/20 1314)  Pulse: 103 (09/04/20 1314)  Resp: (!) 22 (09/04/20 1314)  BP: (!) 156/70 (09/04/20 1314)  SpO2: 98 % (09/04/20 1314)    Physical Exam:  Lungs: On O2 by Nasal canula, no respiratory distress at present  Cardiac: regular rate and rhythm  Alert and oriented.    Laboratory  CBC:   Lab Results   Component Value Date/Time    WBC 8.32 07/23/2020 01:55 PM    RBC 3.21 (L) 07/23/2020 01:55 PM    HGB 9.7 (L) 07/23/2020 01:55 PM    HCT 30.1 (L) 07/23/2020 01:55 PM     07/23/2020 01:55 PM    MCV 94 07/23/2020 01:55 PM    MCH 30.2 07/23/2020 01:55 PM    MCHC 32.2 07/23/2020 01:55 PM     Coagulation:   Lab Results   Component Value Date/Time    INR 0.9 09/04/2020 12:56 PM    APTT 30.8 09/04/2020 12:56 PM           ASSESSMENT/PLAN:     Right lung nodule.    Patient will undergo CT guided lung biopsy.    Sedation/Anesthesia Assessment:  ASA Classification: III = Severe systemic disease limiting activity  Mallampati Score: III (soft and hard palate and base of uvula visible)    Sedation  History: No problems    Sedation Plan: Conscious sedation

## 2020-09-04 NOTE — DISCHARGE INSTRUCTIONS
CT-Guided Lung Biopsy  CT-guided lung biopsy is a procedure to collect small tissue samples from an abnormal area in your lung. During the procedure, an imaging method called CT (computed tomography) is used to show live pictures of your lung. Then a thin needle is used to remove the tissue samples. The samples are tested in a lab for cancer and other problems.     For the biopsy, a thin needle is used to remove samples of tissue from an abnormal area in the lung.   Getting ready for your procedure  Follow any instructions from your healthcare provider.  Tell your provider about any medicines you are taking. It is important for your provider to know if you are taking any blood-thinning medicines or have a bleeding disorder.  You may need to stop taking all or some medicine before the procedure. This includes:  · All prescription medicines  · Blood-thinning medicines (anticoagulants)  · Over-the-counter medicines such as aspirin or ibuprofen  · Street drugs  · Herbs, vitamins, and other supplements  Also tell your provider if you:  · Are pregnant or think you may be pregnant  · Are breastfeeding  · Are allergic to or have intolerances to any medicines  · Have a chronic cough, new cough, or other illness  · Use oxygen therapy at home  · Smoke or drink alcohol on a regular basis  Follow any directions youre given for not eating or drinking before the procedure.  The day of your procedure  The procedure takes about 60 minutes. The entire procedure (including time to prepare and recover) takes several hours. Youll likely go home the same day.  Before the procedure begins:  · An IV (intravenous) line may be put into a vein in your hand or arm. This line supplies fluids and medicines.  · To keep you free of pain during the procedure, you may be given anesthesia. Depending on the type of anesthesia used, you may be awake, drowsy, or in a deep sleep for the procedure.  During the procedure:  · Youll lie on a CT scan  table. You may be on your back, side, or stomach. Pictures of your lung are then taken using the CT scanner. This helps your provider find the best place to position the needle in your lungs.  · A kg is made on your skin where the needle will be inserted (biopsy site). The site is injected with numbing medicine.  · Using the CT pictures as a guide, the needle is passed through the numbed skin between your ribs and into your lung. Samples of tissue are then removed from the abnormal area in your lung. The samples are sent to a lab to be checked for problems.  · When the procedure is complete, the needle is removed. Pressure is applied to the biopsy site to help stop any bleeding. The site is then bandaged.  After the procedure:  · Youll be taken to a room to rest until the anesthesia wears off.  · A chest X-ray may be done. This is to make sure there was no damage to your lungs or the area where the needle was placed.  · When its time for you to go home, have an adult family member or friend ready to drive you.  Recovering at home  You may cough up a small amount of blood shortly after the procedure. Later, you may also have some soreness around the biopsy site. Once at home, follow any instructions youre given. Be sure to:  · Take all medicines as directed.  · Care for the biopsy site as instructed.  · Check for signs of infection at the biopsy site (see below).  · Do not bathe or shower until your provider says it's OK. If you wish, you may wash with a sponge or washcloth.  · Avoid heavy lifting and other strenuous activities as directed.  · If you plan any air travel, ask your provider when you can do so. You may be told not to fly for a few weeks. This is because pressure changes may affect your lungs.  When to call your provider  Call 911 or your local emergency number if you have sudden, severe difficulty breathing. This could be a life-threatening emergency.  Call your healthcare provider for less severe  symptoms that are still concerning. If you are unable to speak with your provider, go to the emergency room if you have any of the following symptoms:  · Fever of 100.4°F (38°C) or higher, or as directed by your provider  · Sudden chest pain, shortness of breath, or fainting  · Coughing up increasing amounts of blood  · Signs of infection at the biopsy site, such as increased redness or swelling, warmth, more pain, bleeding, or bad-smelling drainage   Follow-up  The provider who ordered your test will discuss the biopsy results with you during a follow-up visit. Results are usually ready within 1 to 2 weeks. If more tests or treatments are needed, your provider will discuss these with you.  Risks and possible complications  All procedures have some risk. Possible risks of this procedure include:  · An air leak in your lung (pneumothorax). This may require a stay in the hospital and treatment to re-inflate the lung.  · Bleeding into or around the lung  · Infection in the skin or lung  · Injury to other structures in the chest  · Risks of anesthesia. This will be discussed with you before the procedure.   Date Last Reviewed: 6/11/2015  © 4110-1734 [employer]. [address] [place] [address], [place] [address]. All rights reserved. This information is not intended as a substitute for professional medical care. Always follow your healthcare professional's instructions.

## 2020-09-04 NOTE — PLAN OF CARE
Nalini Varma has met all discharge criteria from Phase II. Vital Signs are stable, ambulating  without difficulty.Dr. Starks read xray and ok'd for discharge. Discharge instructions given, patient verbalized understanding. Discharged from facility via wheelchair in stable condition.

## 2020-09-04 NOTE — DISCHARGE SUMMARY
Discharge Diagnosis: [facility] 1stFl CheckIn  Discharge Summary      Admit Date: 9/4/2020    Discharge Date and Time: 9/4/20 at 1800 hours    Attending Physician: Ranjit Starks MD     Reason for Admission: Lung nodule    Procedures Performed: Procedure(s) (LRB):  BIOPSY, WITH CT GUIDANCE (N/A)    Hospital Course (synopsis of major diagnoses, care, treatment, and services provided during the course of the hospital stay): CT guided biopsy of RUL lung nodule     Consults: none    Significant Diagnostic Studies: Radiology: X-Ray: CXR: X-Ray Chest 1 View (CXR): No results found for this visit on 09/04/20.    Final Diagnoses:    RUL lung nodule    Discharged Condition: stable    Disposition: Home or Self Care    Follow Up/Patient Instructions:     Medications:  Reconciled Home Medications:      Medication List      CHANGE how you take these medications    cyanocobalamin (vitamin B-12) 1,000 mcg Tbsr  Take 1,000 mcg by mouth once daily.  What changed:   · when to take this  · reasons to take this        CONTINUE taking these medications    ADVAIR -21 mcg/actuation Hfaa inhaler  Generic drug: fluticasone propion-salmeterol 115-21 mcg/dose  Inhale 2 puffs into the lungs every 12 (twelve) hours.     albuterol 90 mcg/actuation inhaler  Commonly known as: VENTOLIN HFA  inhale 1-2 puffs as needed every 6 hours for wheezing and shortness of breath     amLODIPine 10 MG tablet  Commonly known as: NORVASC  Take 1 tablet (10 mg total) by mouth once daily.     atorvastatin 40 MG tablet  Commonly known as: LIPITOR  TAKE 1 TABLET BY MOUTH EVERY DAILY     biotin 1 mg Cap  Take by mouth.     CENTRUM SILVER WOMEN ORAL  Take 1 tablet by mouth once daily.     famotidine 20 MG tablet  Commonly known as: PEPCID  TAKE 1 TABLET BY MOUTH TWO TIMES A DAY     fluticasone propionate 50 mcg/actuation nasal spray  Commonly known as: FLONASE  1 spray by Each Nare route 2 (two) times daily as needed for Rhinitis.     guaiFENesin  600 mg 12 hr tablet  Commonly known as: MUCINEX  Take 1,200 mg by mouth 2 (two) times daily as needed for Congestion.     HYDROcodone-acetaminophen  mg per tablet  Commonly known as: NORCO  Take 1 tablet by mouth every 12 hours as needed for pain     INCRUSE ELLIPTA 62.5 mcg/actuation inhalation capsule  Generic drug: umeclidinium  Inhale 1 puff into the lungs once daily. Controller     losartan 100 MG tablet  Commonly known as: COZAAR  Take 1 tablet (100 mg total) by mouth once daily.     magnesium oxide 400 mg magnesium Cap  Take 1 capsule by mouth every 12 (twelve) hours.     nicotine (polacrilex) 2 mg Gum  Commonly known as: NICORETTE  Take 1 gum (2 mg total) by mouth as needed (Use in place of cigarettes).     nicotine 21 mg/24 hr  Commonly known as: NICODERM CQ  Place 1 patch onto the skin once daily.     potassium chloride SA 10 MEQ tablet  Commonly known as: K-DUR,KLOR-CON  Take 1 tablet (10 mEq total) by mouth once daily.     PROLIA 60 mg/mL Syrg  Generic drug: denosumab  Inject 1 mL (60 mg total) into the skin every 6 (six) months.     torsemide 20 MG Tab  Commonly known as: DEMADEX  Take 2 tablets (40 mg total) by mouth once daily.     vitamin D 1000 units Tab  Commonly known as: VITAMIN D3  Take 1,000 Units by mouth once daily.     walker Misc  Commonly known as: ULTRA-LIGHT ROLLATOR  1 each by Misc.(Non-Drug; Combo Route) route once daily.          Discharge Procedure Orders   Diet general     Call MD for:  redness, tenderness, or signs of infection (pain, swelling, redness, odor or green/yellow discharge around incision site)     Other restrictions (specify):   Order Comments: No strenuous activities for 48 hours     Call MD for:  temperature >100.4     Call MD for:  persistent nausea and vomiting     Call MD for:  difficulty breathing, headache or visual disturbances     Call MD for:  persistent dizziness or light-headedness     Remove dressing in 24 hours

## 2020-09-15 ENCOUNTER — TELEPHONE (OUTPATIENT)
Dept: INTERNAL MEDICINE | Facility: CLINIC | Age: 70
End: 2020-09-15

## 2020-09-15 DIAGNOSIS — C34.90 MALIGNANT NEOPLASM OF LUNG, UNSPECIFIED LATERALITY, UNSPECIFIED PART OF LUNG: Primary | ICD-10-CM

## 2020-09-15 NOTE — TELEPHONE ENCOUNTER
Called and discussed lung biopsy results with pt + adenocarcinoma of lung    - discussed f/u with oncology asap to discuss findings and plan moving forward. All questions were answered and pt verbalized understanding of plan.     - please arrange appt with Dr. Seymour if possible as she previously saw pt for anemia  - pt reports it needs to be at least 4 days out to give her time to arrange transportation to that appt - if f/u with Dr. Seymour will take longer than 7-10 days then please arrange with a provider with availability for eval of new onset lung cancer

## 2020-09-16 ENCOUNTER — PATIENT MESSAGE (OUTPATIENT)
Dept: INTERNAL MEDICINE | Facility: CLINIC | Age: 70
End: 2020-09-16

## 2020-09-22 ENCOUNTER — OFFICE VISIT (OUTPATIENT)
Dept: HEMATOLOGY/ONCOLOGY | Facility: CLINIC | Age: 70
End: 2020-09-22
Attending: INTERNAL MEDICINE
Payer: MEDICARE

## 2020-09-22 ENCOUNTER — LAB VISIT (OUTPATIENT)
Dept: LAB | Facility: OTHER | Age: 70
End: 2020-09-22
Attending: INTERNAL MEDICINE
Payer: MEDICARE

## 2020-09-22 VITALS
SYSTOLIC BLOOD PRESSURE: 138 MMHG | RESPIRATION RATE: 20 BRPM | DIASTOLIC BLOOD PRESSURE: 65 MMHG | TEMPERATURE: 97 F | HEIGHT: 66 IN | HEART RATE: 92 BPM | OXYGEN SATURATION: 100 % | WEIGHT: 239 LBS | BODY MASS INDEX: 38.41 KG/M2

## 2020-09-22 DIAGNOSIS — C34.11 MALIGNANT NEOPLASM OF UPPER LOBE OF RIGHT LUNG: Primary | ICD-10-CM

## 2020-09-22 DIAGNOSIS — J96.11 CHRONIC RESPIRATORY FAILURE WITH HYPOXIA: ICD-10-CM

## 2020-09-22 DIAGNOSIS — I50.30 HEART FAILURE WITH PRESERVED EJECTION FRACTION, BORDERLINE, CLASS III: ICD-10-CM

## 2020-09-22 DIAGNOSIS — N18.30 CHRONIC KIDNEY DISEASE, STAGE III (MODERATE): ICD-10-CM

## 2020-09-22 DIAGNOSIS — C34.11 MALIGNANT NEOPLASM OF UPPER LOBE OF RIGHT LUNG: ICD-10-CM

## 2020-09-22 LAB
ALBUMIN SERPL BCP-MCNC: 3.4 G/DL (ref 3.5–5.2)
ALP SERPL-CCNC: 89 U/L (ref 55–135)
ALT SERPL W/O P-5'-P-CCNC: 16 U/L (ref 10–44)
ANION GAP SERPL CALC-SCNC: 14 MMOL/L (ref 8–16)
AST SERPL-CCNC: 17 U/L (ref 10–40)
BASOPHILS # BLD AUTO: 0.02 K/UL (ref 0–0.2)
BASOPHILS NFR BLD: 0.2 % (ref 0–1.9)
BILIRUB SERPL-MCNC: 0.3 MG/DL (ref 0.1–1)
BUN SERPL-MCNC: 17 MG/DL (ref 8–23)
CALCIUM SERPL-MCNC: 8.5 MG/DL (ref 8.7–10.5)
CHLORIDE SERPL-SCNC: 98 MMOL/L (ref 95–110)
CO2 SERPL-SCNC: 22 MMOL/L (ref 23–29)
CREAT SERPL-MCNC: 1.3 MG/DL (ref 0.5–1.4)
DIFFERENTIAL METHOD: ABNORMAL
EOSINOPHIL # BLD AUTO: 0.1 K/UL (ref 0–0.5)
EOSINOPHIL NFR BLD: 0.8 % (ref 0–8)
ERYTHROCYTE [DISTWIDTH] IN BLOOD BY AUTOMATED COUNT: 14 % (ref 11.5–14.5)
EST. GFR  (AFRICAN AMERICAN): 48 ML/MIN/1.73 M^2
EST. GFR  (NON AFRICAN AMERICAN): 42 ML/MIN/1.73 M^2
GLUCOSE SERPL-MCNC: 90 MG/DL (ref 70–110)
HCT VFR BLD AUTO: 33.7 % (ref 37–48.5)
HGB BLD-MCNC: 10.9 G/DL (ref 12–16)
IMM GRANULOCYTES # BLD AUTO: 0.05 K/UL (ref 0–0.04)
IMM GRANULOCYTES NFR BLD AUTO: 0.5 % (ref 0–0.5)
LYMPHOCYTES # BLD AUTO: 2.4 K/UL (ref 1–4.8)
LYMPHOCYTES NFR BLD: 21.8 % (ref 18–48)
MCH RBC QN AUTO: 31.1 PG (ref 27–31)
MCHC RBC AUTO-ENTMCNC: 32.3 G/DL (ref 32–36)
MCV RBC AUTO: 96 FL (ref 82–98)
MONOCYTES # BLD AUTO: 1 K/UL (ref 0.3–1)
MONOCYTES NFR BLD: 8.9 % (ref 4–15)
NEUTROPHILS # BLD AUTO: 7.5 K/UL (ref 1.8–7.7)
NEUTROPHILS NFR BLD: 67.8 % (ref 38–73)
NRBC BLD-RTO: 0 /100 WBC
PLATELET # BLD AUTO: 264 K/UL (ref 150–350)
PMV BLD AUTO: 9.6 FL (ref 9.2–12.9)
POTASSIUM SERPL-SCNC: 4.4 MMOL/L (ref 3.5–5.1)
PROT SERPL-MCNC: 6.8 G/DL (ref 6–8.4)
RBC # BLD AUTO: 3.5 M/UL (ref 4–5.4)
SODIUM SERPL-SCNC: 134 MMOL/L (ref 136–145)
WBC # BLD AUTO: 11.11 K/UL (ref 3.9–12.7)

## 2020-09-22 PROCEDURE — 3078F PR MOST RECENT DIASTOLIC BLOOD PRESSURE < 80 MM HG: ICD-10-PCS | Mod: CPTII,S$GLB,, | Performed by: INTERNAL MEDICINE

## 2020-09-22 PROCEDURE — 3008F PR BODY MASS INDEX (BMI) DOCUMENTED: ICD-10-PCS | Mod: CPTII,S$GLB,, | Performed by: INTERNAL MEDICINE

## 2020-09-22 PROCEDURE — 36415 COLL VENOUS BLD VENIPUNCTURE: CPT

## 2020-09-22 PROCEDURE — 3288F PR FALLS RISK ASSESSMENT DOCUMENTED: ICD-10-PCS | Mod: CPTII,S$GLB,, | Performed by: INTERNAL MEDICINE

## 2020-09-22 PROCEDURE — 1159F PR MEDICATION LIST DOCUMENTED IN MEDICAL RECORD: ICD-10-PCS | Mod: S$GLB,,, | Performed by: INTERNAL MEDICINE

## 2020-09-22 PROCEDURE — 1125F AMNT PAIN NOTED PAIN PRSNT: CPT | Mod: S$GLB,,, | Performed by: INTERNAL MEDICINE

## 2020-09-22 PROCEDURE — 3075F SYST BP GE 130 - 139MM HG: CPT | Mod: CPTII,S$GLB,, | Performed by: INTERNAL MEDICINE

## 2020-09-22 PROCEDURE — 3008F BODY MASS INDEX DOCD: CPT | Mod: CPTII,S$GLB,, | Performed by: INTERNAL MEDICINE

## 2020-09-22 PROCEDURE — 99999 PR PBB SHADOW E&M-EST. PATIENT-LVL V: CPT | Mod: PBBFAC,,, | Performed by: INTERNAL MEDICINE

## 2020-09-22 PROCEDURE — 99499 UNLISTED E&M SERVICE: CPT | Mod: S$GLB,,, | Performed by: INTERNAL MEDICINE

## 2020-09-22 PROCEDURE — 1100F PR PT FALLS ASSESS DOC 2+ FALLS/FALL W/INJURY/YR: ICD-10-PCS | Mod: CPTII,S$GLB,, | Performed by: INTERNAL MEDICINE

## 2020-09-22 PROCEDURE — 3075F PR MOST RECENT SYSTOLIC BLOOD PRESS GE 130-139MM HG: ICD-10-PCS | Mod: CPTII,S$GLB,, | Performed by: INTERNAL MEDICINE

## 2020-09-22 PROCEDURE — 80053 COMPREHEN METABOLIC PANEL: CPT

## 2020-09-22 PROCEDURE — 1125F PR PAIN SEVERITY QUANTIFIED, PAIN PRESENT: ICD-10-PCS | Mod: S$GLB,,, | Performed by: INTERNAL MEDICINE

## 2020-09-22 PROCEDURE — 85025 COMPLETE CBC W/AUTO DIFF WBC: CPT

## 2020-09-22 PROCEDURE — 1100F PTFALLS ASSESS-DOCD GE2>/YR: CPT | Mod: CPTII,S$GLB,, | Performed by: INTERNAL MEDICINE

## 2020-09-22 PROCEDURE — 99499 RISK ADDL DX/OHS AUDIT: ICD-10-PCS | Mod: S$GLB,,, | Performed by: INTERNAL MEDICINE

## 2020-09-22 PROCEDURE — 3288F FALL RISK ASSESSMENT DOCD: CPT | Mod: CPTII,S$GLB,, | Performed by: INTERNAL MEDICINE

## 2020-09-22 PROCEDURE — 3078F DIAST BP <80 MM HG: CPT | Mod: CPTII,S$GLB,, | Performed by: INTERNAL MEDICINE

## 2020-09-22 PROCEDURE — 99999 PR PBB SHADOW E&M-EST. PATIENT-LVL V: ICD-10-PCS | Mod: PBBFAC,,, | Performed by: INTERNAL MEDICINE

## 2020-09-22 PROCEDURE — 1159F MED LIST DOCD IN RCRD: CPT | Mod: S$GLB,,, | Performed by: INTERNAL MEDICINE

## 2020-09-22 PROCEDURE — 99214 PR OFFICE/OUTPT VISIT, EST, LEVL IV, 30-39 MIN: ICD-10-PCS | Mod: S$GLB,,, | Performed by: INTERNAL MEDICINE

## 2020-09-22 PROCEDURE — 99214 OFFICE O/P EST MOD 30 MIN: CPT | Mod: S$GLB,,, | Performed by: INTERNAL MEDICINE

## 2020-09-22 NOTE — LETTER
September 22, 2020      Yane Sahni MD  [address]  [facility] [address]           [facility] [address]  [address], [address]  [facility] [address]  Phone: [phone]          Patient: Nalini Varma   MR Number: [id number]   YOB: 1950   Date of Visit: 9/22/2020       Dear Dr. Yane Sahni:    Thank you for referring Nalini Varma to me for evaluation. Attached you will find relevant portions of my assessment and plan of care.    If you have questions, please do not hesitate to call me. I look forward to following Nalini Varma along with you.    Sincerely,    Jessika Seymour MD    Enclosure  CC:  No Recipients    If you would like to receive this communication electronically, please contact [email or web address] or [phone] to request more information on [facility] Link access.    For providers and/or their staff who would like to refer a patient to [facility], please contact us through our one-stop-shop provider referral line, [facility] [place], at [phone].    If you feel you have received this communication in error or would no longer like to receive these types of communications, please e-mail [email or web address]

## 2020-09-22 NOTE — PROGRESS NOTES
CC:  Lung adenocarcinoma, lepidic pattern    HPI:  Ms Varma is a 70 yo woman with chronic pain, anxiety, COPD on 2 liters of home oxygen, CHF with preserved ejection fraction, HTN, CKD stage 3 (creatinine 1.6), who presents for further evaluation of newly diagnosed lung adenocarcinoma. She underwent a screening CT chest on 20, which showed a 0.6 cm right upper lobe noncalcified pulmonary nodule which measured 0.4 cm on previous CT dated 2019.  There is a pleural based 1.2 cm pulmonary nodule within the right upper lobe which measured 0.9 cm on previous CT.  There is a 1.2 cm partially spiculated right upper lobe noncalcified pulmonary nodule which measured 0.6 cm on previous CT. She underwent a right lung biopsy on 2020. Pathology showed adenocarcinoma, well differentiated, lepidic pattern, cannot rule out an invasive component. She presents today for further evaluation. Has been smoking 2PPD for 50 years. Enrolled in smoking cessation program. Now down to 1PPD. Has been on 2L O2 for many years. Needs to use 3 liters when she is wearing a mask. Has CHF. On torsemide. Uses two pillows at night. Unable to walk for a block due to dyspnea. No weight loss. Lives by herself.     ECO    Past Medical History:   Diagnosis Date    Allergy     Anemia due to gastrointestinal blood loss     LESLY from gastric ulcer due to chronic nsaid use    Anxiety     Arthritis     CKD (chronic kidney disease) stage 3, GFR 30-59 ml/min     followed by [facility] study - recieved labs from  and 2018    Gastric ulcer     H/O knee surgery     right    Heart failure with preserved ejection fraction 2018    Hx of psychiatric care     Hyperlipidemia     Hyponatremia     Psychiatric problem     Therapy         Past Surgical History:   Procedure Laterality Date    BREAST CYST EXCISION Right     1967    cataract surgery      COLONOSCOPY      CORONARY ANGIOGRAPHY N/A 2018    Procedure:  ANGIOGRAM, CORONARY ARTERY;  Surgeon: Willard Roberts MD;  Location: [facility] CATH LAB;  Service: Cardiovascular;  Laterality: N/A;    ESOPHAGOGASTRODUODENOSCOPY  2015    EYE SURGERY  2017    FRACTURE SURGERY      left femur    JOINT REPLACEMENT  2007    left knee x 2, 2nd performed 2/2 to MRSA infection 3 months after 1st surgery    ORIF FEMUR FRACTURE Left     TUBAL LIGATION         Family History   Problem Relation Age of Onset    Hypertension Mother     Alzheimer's disease Mother     Dementia Mother     Depression Mother         Alzheimers    Myasthenia gravis Father     Arthritis Father     Rectal cancer Father     Hypertension Brother     Arthritis Brother         Colon cancer, obese, high blood pressure    Colon cancer Brother     No Known Problems Son     Cancer Sister         brain    Miscarriages / Stillbirths Sister     Melanoma Neg Hx     Breast cancer Neg Hx        Social History     Socioeconomic History    Marital status: Single     Spouse name: Not on file    Number of children: 2    Years of education: Not on file    Highest education level: Not on file   Occupational History    Occupation: unemployed   Social Needs    Financial resource strain: Somewhat hard    Food insecurity     Worry: Sometimes true     Inability: Sometimes true    Transportation needs     Medical: No     Non-medical: Yes   Tobacco Use    Smoking status: Current Every Day Smoker     Packs/day: 1.50     Years: 50.00     Pack years: 75.00     Types: Cigarettes    Smokeless tobacco: Never Used   Substance and Sexual Activity    Alcohol use: Yes     Alcohol/week: 7.0 standard drinks     Types: 7 Shots of liquor per week     Frequency: 4 or more times a week     Drinks per session: 1 or 2     Binge frequency: Never     Comment: at least 1 cocktail a day    Drug use: No    Sexual activity: Not Currently     Birth control/protection: Abstinence   Lifestyle    Physical activity     Days per week: 0  days     Minutes per session: 0 min    Stress: Only a little   Relationships    Social connections     Talks on phone: Patient refused     Gets together: Three times a week     Attends Spiritism service: Not on file     Active member of club or organization: No     Attends meetings of clubs or organizations: Never     Relationship status:    Other Topics Concern    Are you pregnant or think you may be? No    Breast-feeding Not Asked    Patient feels they ought to cut down on drinking/drug use Not Asked    Patient annoyed by others criticizing their drinking/drug use Not Asked    Patient has felt bad or guilty about drinking/drug use Not Asked    Patient has had a drink/used drugs as an eye opener in the AM Not Asked   Social History Narrative    Originally from [place]    Living in [facility] since 1998    Living in [facility]       Review of Systems   Constitutional: Negative for appetite change, chills, fatigue, fever and unexpected weight change.   HENT: Negative for mouth sores, nosebleeds, tinnitus, trouble swallowing and voice change.    Eyes: Negative for pain, redness and visual disturbance.   Respiratory: Positive for shortness of breath (chronic). Negative for cough and wheezing.    Cardiovascular: Positive for leg swelling (chronic). Negative for chest pain and palpitations.   Gastrointestinal: Negative for abdominal distention, abdominal pain, blood in stool, constipation, diarrhea, nausea and vomiting.   Endocrine: Negative for polydipsia, polyphagia and polyuria.   Genitourinary: Negative for flank pain, frequency and hematuria.   Musculoskeletal: Positive for arthralgias (chronic) and myalgias (chronic). Negative for back pain, gait problem, joint swelling, neck pain and neck stiffness.   Skin: Negative for color change, pallor, rash and wound.   Neurological: Negative for tremors, seizures, syncope, speech difficulty, weakness, light-headedness, numbness and headaches.   Hematological:  Negative for adenopathy. Does not bruise/bleed easily.   Psychiatric/Behavioral: Negative for confusion, dysphoric mood and self-injury. The patient is not nervous/anxious.    All other systems reviewed and are negative.      Objective:  Physical Exam   Constitutional: She is oriented to person, place, and time. She appears well-developed and well-nourished. No distress.   Morbidly obese, uses a walker   HENT:   Head: Normocephalic and atraumatic.   Eyes: Pupils are equal, round, and reactive to light. Conjunctivae are normal. No scleral icterus.   Neck: Normal range of motion. Neck supple.   Cardiovascular: Normal rate and regular rhythm. Exam reveals no gallop and no friction rub.   No murmur heard.  Pulmonary/Chest: No respiratory distress. She has no rales.   Decreased air entry bilaterally   Abdominal: Soft. Bowel sounds are normal. She exhibits no distension and no mass. There is no hepatosplenomegaly. There is no abdominal tenderness. There is no rebound.   Musculoskeletal: Normal range of motion.         General: Edema (b/l LE 2+ edema, skin changes of chronic lymphedema) present.   Lymphadenopathy:     She has no cervical adenopathy.   Neurological: She is alert and oriented to person, place, and time. No cranial nerve deficit.   Skin: Skin is warm and dry. No rash noted. No erythema. No pallor.   Psychiatric: She has a normal mood and affect. Her behavior is normal. Judgment and thought content normal.   Vitals reviewed.      Labs:  Prior labs showed Creatinine of 1.6    Imaging Data:  CT chest 7/23/20:  The soft tissues and vascular structures at the base of the neck are unremarkable.  The mediastinum including the heart and great vessels is significant for calcification of the aorta, aortic annulus, mitral annulus, and coronary arteries.  The visualized portion of the intra-abdominal content is significant for fatty infiltration of the liver which appears enlarged.  The osseous structures demonstrate  degenerative change.     The trachea is patent.  There are several tracheal filling defect which likely represent retained secretion.  The lungs are well expanded.  There is centrilobular emphysema.  There is a 0.6 cm right upper lobe noncalcified pulmonary nodule which measured 0.4 cm on previous CT dated 03/20/2019.  There is a pleural based 1.2 cm pulmonary nodule within the right upper lobe which measured 0.9 cm on previous CT.  There is a 1.2 cm partially spiculated right upper lobe noncalcified pulmonary nodule which measured 0.6 cm on previous CT.  There is no pleural or pericardial fluid.     Impression:     Lung-RADS Category:  4B - Suspicious - consultation advised - possible next steps  Chest CT, tissue sampling and-or PET/CT.     Clinically or potentially clinically significant non lung cancer finding:  S - Significant.  Fatty liver    Assessment and plan:    1. Malignant neoplasm of upper lobe of right lung    2. Chronic respiratory failure with hypoxia    3. Chronic kidney disease, stage III (moderate)    4. Heart failure with preserved ejection fraction, borderline, class III      1.  - Ms Varma is a 70 yo woman with multiple medical comorbidities with two enlarging right upper lobe nodules. Biopsy of one nodule showed well differentiated adenocarcinoma,  lepidic pattern, cannot rule out an invasive component  - Long discussion with patient re the biopsy results. Discussed adenocarcinoma in situ of the lung. Cannot r/o invasive component. I would treat as lung cancer  - discussed complete staging with PET scan and brain images. Cr 1.6. She declined MRI because she has metal in her ear. Will get CT head  - return after scan to discuss results and plan. If no other disease sites will refer to rad onc for radiation  - messaged Dr Zayas for molecular testing    2.  - chronically on oxygen, 2 liters baseline, 3 liters with exertion  - continue    3.  - Cr 1.6  - avoid nephrotoxins    4.  - on torsemide  -  poor candidate for surgery.     Her multiple questions were answered in the clinic. Encouraged to call should issues arise.

## 2020-09-22 NOTE — Clinical Note
CBC, CMP today. Appointments need to be 4 days in advance and no appointment prior to 10 am, as per patient request (need to arrange for transportation). Get PET scan and CT head. See me after scans to discuss results and treatment plan

## 2020-09-25 ENCOUNTER — TELEPHONE (OUTPATIENT)
Dept: HEMATOLOGY/ONCOLOGY | Facility: CLINIC | Age: 70
End: 2020-09-25

## 2020-09-25 ENCOUNTER — HOSPITAL ENCOUNTER (OUTPATIENT)
Dept: RADIOLOGY | Facility: OTHER | Age: 70
Discharge: HOME | End: 2020-09-25
Attending: INTERNAL MEDICINE
Payer: MEDICARE

## 2020-09-25 DIAGNOSIS — C34.11 MALIGNANT NEOPLASM OF UPPER LOBE OF RIGHT LUNG: ICD-10-CM

## 2020-09-25 PROCEDURE — 70450 CT HEAD/BRAIN W/O DYE: CPT | Mod: 26,,, | Performed by: RADIOLOGY

## 2020-09-25 PROCEDURE — 70450 CT HEAD/BRAIN W/O DYE: CPT | Mod: TC

## 2020-09-25 PROCEDURE — 70450 CT HEAD WITHOUT CONTRAST: ICD-10-PCS | Mod: 26,,, | Performed by: RADIOLOGY

## 2020-09-25 NOTE — TELEPHONE ENCOUNTER
----- Message from Karin Walls sent at 9/25/2020  2:16 PM CDT -----  Regarding: pet scan  Name of Who is Calling: ELIEZER RODRIGUEZ [[id number]]      What is the request in detail: Patient is requesting a call back concerning her pet scan has not been approved by her insurance company       Can the clinic reply by [facility]: no      What Number to Call Back if not in [facility]: [phone]

## 2020-09-25 NOTE — TELEPHONE ENCOUNTER
Called and spoke with Ms Varma. Informed Ms Varma we were notified that her PET scan authorization was pending. Dr Seymour request the appointment be rescheduled to a later day. This will allow the additional time to process the PA. We will also reschedule her f/u with Dr Seymour. This appointment will be rescheduled to 2-3 days after her PET scan.. Ms Varma request a 3-4 day notice of any appointment scheduled, due to she need to arrange transportation.

## 2020-09-25 NOTE — TELEPHONE ENCOUNTER
----- Message from Jessika Seymour MD sent at 9/25/2020  2:18 PM CDT -----  Looks like her PET scan next Monday will be rescheduled due to pending authorization. Can you follow up on that. If PET scan rescheduled, her visit with me on Tuesday needs to be rescheduled to 1-2 days after PET scan

## 2020-09-29 ENCOUNTER — PATIENT MESSAGE (OUTPATIENT)
Dept: HEMATOLOGY/ONCOLOGY | Facility: CLINIC | Age: 70
End: 2020-09-29

## 2020-09-29 LAB
COMMENT: ABNORMAL
FINAL PATHOLOGIC DIAGNOSIS: ABNORMAL
GROSS: ABNORMAL
MICROSCOPIC EXAM: ABNORMAL
SUPPLEMENTAL DIAGNOSIS: ABNORMAL

## 2020-09-29 NOTE — PHYSICIAN QUERY
PT Name: Nalini Varma  MR #: [id number]    Pathology Findings Clarification     CDS/: Paige Sams RN          Contact information: [email or web address]   This form is a permanent document in the medical record.     Query Date: September 29, 2020    By submitting this query, we are merely seeking further clarification of documentation.  Please utilize your independent clinical judgment when addressing the question(s) below.    The medical record contains the following:  Pathology Findings Location in Medical Record   Lung, right (needle biopsy):   Adenocarcinoma, well differentiated, lepidic pattern   Cannot rule out an invasive component    History of enlarging lung nodules  Pathology report of 9/4/20        H & P of 9/4       Please clarify pathology findings of right lung  [ x ] Pathology findings noted above are ruled in/confirmed as diagnoses   [  ] Pathology findings noted above are not confirmed as diagnoses   [  ] Other diagnosis (please specify): ___________   [  ] Clinically Undetermined       Please document in your progress notes daily for the duration of treatment until resolved and include in your discharge summary.

## 2020-10-01 ENCOUNTER — PATIENT MESSAGE (OUTPATIENT)
Dept: INTERNAL MEDICINE | Facility: CLINIC | Age: 70
End: 2020-10-01

## 2020-10-01 DIAGNOSIS — G89.4 CHRONIC PAIN DISORDER: ICD-10-CM

## 2020-10-01 RX ORDER — HYDROCODONE BITARTRATE AND ACETAMINOPHEN 10; 325 MG/1; MG/1
1 TABLET ORAL EVERY 12 HOURS
Qty: 60 TABLET | Refills: 0 | Status: SHIPPED | OUTPATIENT
Start: 2020-10-01 | End: 2020-11-04 | Stop reason: SDUPTHER

## 2020-10-05 NOTE — TELEPHONE ENCOUNTER
----- Message from Yessy Fernandez sent at 12/17/2019 11:47 AM CST -----  Contact: Self      Can the clinic reply in [facility]: N      Please refill the medication(s) listed below. Please call the patient when the prescription(s) is ready for  at this phone number  [phone]      Medication #1 HYDROcodone-acetaminophen (NORCO)  mg per tablet    Medication #2       Preferred Pharmacy: [facility] at  [phone]    
Attempted to contact patient to inform her requested medication sent to pharmacy. Left message on voicemail.   
Message routed to provider and medication pended.   
Eloped (saw a physician/midlevel provider but left without telling anyone)

## 2020-10-07 ENCOUNTER — HOSPITAL ENCOUNTER (OUTPATIENT)
Dept: RADIOLOGY | Facility: HOSPITAL | Age: 70
Discharge: HOME | End: 2020-10-07
Attending: INTERNAL MEDICINE
Payer: MEDICARE

## 2020-10-07 DIAGNOSIS — C34.11 MALIGNANT NEOPLASM OF UPPER LOBE OF RIGHT LUNG: ICD-10-CM

## 2020-10-07 LAB — POCT GLUCOSE: 90 MG/DL (ref 70–110)

## 2020-10-07 PROCEDURE — A9552 F18 FDG: HCPCS

## 2020-10-07 PROCEDURE — 78815 NM PET CT ROUTINE: ICD-10-PCS | Mod: 26,PS,, | Performed by: RADIOLOGY

## 2020-10-07 PROCEDURE — 78815 PET IMAGE W/CT SKULL-THIGH: CPT | Mod: TC

## 2020-10-07 PROCEDURE — 78815 PET IMAGE W/CT SKULL-THIGH: CPT | Mod: 26,PS,, | Performed by: RADIOLOGY

## 2020-10-12 ENCOUNTER — OFFICE VISIT (OUTPATIENT)
Dept: HEMATOLOGY/ONCOLOGY | Facility: CLINIC | Age: 70
End: 2020-10-12
Payer: MEDICARE

## 2020-10-12 ENCOUNTER — CLINICAL SUPPORT (OUTPATIENT)
Dept: SMOKING CESSATION | Facility: CLINIC | Age: 70
End: 2020-10-12
Payer: COMMERCIAL

## 2020-10-12 VITALS
RESPIRATION RATE: 26 BRPM | TEMPERATURE: 96 F | BODY MASS INDEX: 38.34 KG/M2 | WEIGHT: 238.56 LBS | DIASTOLIC BLOOD PRESSURE: 60 MMHG | HEART RATE: 122 BPM | OXYGEN SATURATION: 100 % | SYSTOLIC BLOOD PRESSURE: 129 MMHG | HEIGHT: 66 IN

## 2020-10-12 DIAGNOSIS — C34.91 PRIMARY ADENOCARCINOMA OF RIGHT LUNG: Primary | ICD-10-CM

## 2020-10-12 DIAGNOSIS — J44.9 CHRONIC OBSTRUCTIVE PULMONARY DISEASE, UNSPECIFIED COPD TYPE: ICD-10-CM

## 2020-10-12 DIAGNOSIS — I50.9 CONGESTIVE HEART FAILURE, UNSPECIFIED HF CHRONICITY, UNSPECIFIED HEART FAILURE TYPE: ICD-10-CM

## 2020-10-12 DIAGNOSIS — F17.210 HEAVY CIGARETTE SMOKER (20-39 PER DAY): Primary | ICD-10-CM

## 2020-10-12 PROCEDURE — 3078F DIAST BP <80 MM HG: CPT | Mod: CPTII,S$GLB,, | Performed by: INTERNAL MEDICINE

## 2020-10-12 PROCEDURE — 1125F AMNT PAIN NOTED PAIN PRSNT: CPT | Mod: S$GLB,,, | Performed by: INTERNAL MEDICINE

## 2020-10-12 PROCEDURE — 1100F PR PT FALLS ASSESS DOC 2+ FALLS/FALL W/INJURY/YR: ICD-10-PCS | Mod: CPTII,S$GLB,, | Performed by: INTERNAL MEDICINE

## 2020-10-12 PROCEDURE — 3074F SYST BP LT 130 MM HG: CPT | Mod: CPTII,S$GLB,, | Performed by: INTERNAL MEDICINE

## 2020-10-12 PROCEDURE — 3008F PR BODY MASS INDEX (BMI) DOCUMENTED: ICD-10-PCS | Mod: CPTII,S$GLB,, | Performed by: INTERNAL MEDICINE

## 2020-10-12 PROCEDURE — 3288F PR FALLS RISK ASSESSMENT DOCUMENTED: ICD-10-PCS | Mod: CPTII,S$GLB,, | Performed by: INTERNAL MEDICINE

## 2020-10-12 PROCEDURE — 3078F PR MOST RECENT DIASTOLIC BLOOD PRESSURE < 80 MM HG: ICD-10-PCS | Mod: CPTII,S$GLB,, | Performed by: INTERNAL MEDICINE

## 2020-10-12 PROCEDURE — 99999 PR PBB SHADOW E&M-EST. PATIENT-LVL V: ICD-10-PCS | Mod: PBBFAC,,, | Performed by: INTERNAL MEDICINE

## 2020-10-12 PROCEDURE — 1125F PR PAIN SEVERITY QUANTIFIED, PAIN PRESENT: ICD-10-PCS | Mod: S$GLB,,, | Performed by: INTERNAL MEDICINE

## 2020-10-12 PROCEDURE — 99214 OFFICE O/P EST MOD 30 MIN: CPT | Mod: S$GLB,,, | Performed by: INTERNAL MEDICINE

## 2020-10-12 PROCEDURE — 3074F PR MOST RECENT SYSTOLIC BLOOD PRESSURE < 130 MM HG: ICD-10-PCS | Mod: CPTII,S$GLB,, | Performed by: INTERNAL MEDICINE

## 2020-10-12 PROCEDURE — 99999 PR PBB SHADOW E&M-EST. PATIENT-LVL II: ICD-10-PCS | Mod: PBBFAC,,,

## 2020-10-12 PROCEDURE — 3288F FALL RISK ASSESSMENT DOCD: CPT | Mod: CPTII,S$GLB,, | Performed by: INTERNAL MEDICINE

## 2020-10-12 PROCEDURE — 99404 PR PREVENT COUNSEL,INDIV,60 MIN: ICD-10-PCS | Mod: S$GLB,,,

## 2020-10-12 PROCEDURE — 99214 PR OFFICE/OUTPT VISIT, EST, LEVL IV, 30-39 MIN: ICD-10-PCS | Mod: S$GLB,,, | Performed by: INTERNAL MEDICINE

## 2020-10-12 PROCEDURE — 1159F PR MEDICATION LIST DOCUMENTED IN MEDICAL RECORD: ICD-10-PCS | Mod: S$GLB,,, | Performed by: INTERNAL MEDICINE

## 2020-10-12 PROCEDURE — 99999 PR PBB SHADOW E&M-EST. PATIENT-LVL II: CPT | Mod: PBBFAC,,,

## 2020-10-12 PROCEDURE — 3008F BODY MASS INDEX DOCD: CPT | Mod: CPTII,S$GLB,, | Performed by: INTERNAL MEDICINE

## 2020-10-12 PROCEDURE — 1159F MED LIST DOCD IN RCRD: CPT | Mod: S$GLB,,, | Performed by: INTERNAL MEDICINE

## 2020-10-12 PROCEDURE — 99499 RISK ADDL DX/OHS AUDIT: ICD-10-PCS | Mod: S$GLB,,, | Performed by: INTERNAL MEDICINE

## 2020-10-12 PROCEDURE — 99404 PREV MED CNSL INDIV APPRX 60: CPT | Mod: S$GLB,,,

## 2020-10-12 PROCEDURE — 99499 UNLISTED E&M SERVICE: CPT | Mod: S$GLB,,, | Performed by: INTERNAL MEDICINE

## 2020-10-12 PROCEDURE — 1100F PTFALLS ASSESS-DOCD GE2>/YR: CPT | Mod: CPTII,S$GLB,, | Performed by: INTERNAL MEDICINE

## 2020-10-12 PROCEDURE — 99999 PR PBB SHADOW E&M-EST. PATIENT-LVL V: CPT | Mod: PBBFAC,,, | Performed by: INTERNAL MEDICINE

## 2020-10-12 RX ORDER — IBUPROFEN 200 MG
1 TABLET ORAL DAILY
Qty: 28 PATCH | Refills: 0 | Status: SHIPPED | OUTPATIENT
Start: 2020-10-12 | End: 2020-11-10 | Stop reason: SDUPTHER

## 2020-10-12 RX ORDER — VARENICLINE TARTRATE 0.5 (11)-1
KIT ORAL
Qty: 53 TABLET | Refills: 0 | Status: SHIPPED | OUTPATIENT
Start: 2020-10-12 | End: 2021-01-19 | Stop reason: SDUPTHER

## 2020-10-12 NOTE — PROGRESS NOTES
Individual Follow-Up Form    10/12/2020    Quit Date:     Clinical Status of Patient: Outpatient    Length of Service: 60 minutes    Continuing Medication: no    Other Medications: none     Target Symptoms: Withdrawal and medication side effects. The following were  rated moderate (3) to severe (4) on TCRS:  · Moderate (3): none  · Severe (4): none    Comments: Patient is smoking 1ppd.  Patient will begin on starter pack of Chantix.  We reviewed tobacco cessation strategies.  Patient can no longer smoke in her home which helps with her decrease in use.  Patient will rate fade to 15 cpd.  We discussed willpower, reasons and benefits of quitting.  We discussed health issues associated with tobacco use and benefits of quitting.  Patient states do to recent diagnosis of lung cancer she is more motivated quit now than in the past. The patient denies any abnormal behavioral or mental changes at this time. The patient will continue with group therapy sessions and medication monitoring by CTTS. Prescribed medication management will be by physician.       Diagnosis: F17.210    Next Visit: 2 weeks

## 2020-10-12 NOTE — PROGRESS NOTES
PROGRESS NOTE    Subjective:       Patient ID: Nalini Varma is a 69 y.o. female.    Chief Complaint: follow up for PET scan result    Diagnosis:  Lung adenocarcinoma of right upper lobe, lepidic pattern    Oncologic History:  1. Ms Varma is a 70 yo woman with chronic pain, anxiety, COPD on 2 liters of home oxygen, CHF with preserved ejection fraction, HTN, CKD stage 3 (creatinine 1.6), who presents for further evaluation of newly diagnosed lung adenocarcinoma. She underwent a screening CT chest on 7/23/20, which showed a 0.6 cm right upper lobe noncalcified pulmonary nodule which measured 0.4 cm on previous CT dated 03/20/2019.  There is a pleural based 1.2 cm pulmonary nodule within the right upper lobe which measured 0.9 cm on previous CT.  There is a 1.2 cm partially spiculated right upper lobe noncalcified pulmonary nodule which measured 0.6 cm on previous CT. She underwent a right lung biopsy on 9/4/2020. Pathology showed adenocarcinoma, well differentiated, lepidic pattern, cannot rule out an invasive component. She presents today for further evaluation. Has been smoking 2PPD for 50 years. Enrolled in smoking cessation program. Now down to 1PPD. Has been on 2L O2 for many years. Needs to use 3 liters when she is wearing a mask. Has CHF. On torsemide. Uses two pillows at night. Unable to walk for a block due to dyspnea. No weight loss. Lives by herself  2. CT head negative for metastases. PET scan 10/7/20: Two right upper lobe nodule with no appreciable activity.  Please note that FDG PET has poor sensitivity for small, well differentiated, and/or indolent malignancies.  An additional subcentimeter node in the apex is too small to characterize by PET for which attention on follow-up is recommended. Mildly enlarged mediastinal lymph nodes, as above, with no hypermetabolic activity.  Metastatic involvement is not excluded.    Interval History:   Ms  September 29, 2020     Patient: Shashi Chairez   YOB: 2002       To Whom it May Concern:    Shashi Chairez has autism.  He may not be able to wear a mask at all times due to behavior difficulties.    If you have any questions or concerns, please don't hesitate to call.      Sincerely,         Myra Hunter MD      Medical information is confidential and cannot be disclosed without the written consent of the patient or his representative.    CC: No Recipients     Avani returns to discuss test results. Chronic dyspnea with exertion. Wearing oxygen.     ECO    ROS:   A ten-point system review is obtained and negative except for what was stated in the Interval History.     Physical Examination:   Vital signs reviewed.   General: well hydrated, well developed, in no acute distress, uses a walker with seat  HEENT: normocephalic, PERRLA, EOMI, anicteric sclerae, oropharynx clear  Neck: supple, no JVD, thyromegaly, cervical or supraclavicular lymphadenopathy  Lungs: bilateral mild expiratory phase wheezing  Heart: RRR, no M/R/G  Abdomen: soft, no tenderness, non-distended, no hepatosplenomegaly, mass, or hernia. BS present  Extremities: b/l LE 2+ edema, chronic  Skin: no rash, ulcer  Neuro: alert and oriented x 4, no focal neuro deficit  Psych: pleasant and appropriate mood and affect    Objective:     Laboratory Data:  Labs reviewed.     Imaging Data:  PET scan 10/7/2020:  Impression:     1. Two right upper lobe nodule with no appreciable activity.  Please note that FDG PET has poor sensitivity for small, well differentiated, and/or indolent malignancies.  An additional subcentimeter node in the apex is too small to characterize by PET for which attention on follow-up is recommended.  2. Mildly enlarged mediastinal lymph nodes, as above, with no hypermetabolic activity.  Metastatic involvement is not excluded.  3. Additional CT findings, as above.    CT head 20:  Impression:     No evidence of acute intracranial pathology.     Moderate patchy mucoperiosteal thickening in the paranasal sinuses.    Assessment and Plan:     1. Primary adenocarcinoma of right lung    2. Congestive heart failure, unspecified HF chronicity, unspecified heart failure type    3. Chronic obstructive pulmonary disease, unspecified COPD type      1.   - Ms Varma is a 70 yo woman with multiple medical comorbidities with two enlarging right upper lobe nodules. Biopsy of one nodule showed well  differentiated adenocarcinoma,  lepidic pattern, cannot rule out an invasive component  - discussed CT head and PET scan result. Two right upper lobe nodules. No systemic spread. Discussed the diagnosis of early stage lung cancer. Not a candidate for surgery given comorbidities and poor pulmonary function. Discussed with Dr Mead. She is a candidate for radiation.   - scheduled to see Dr Mead tomorrow  - Her multiple questions were answered in the clinic. Encouraged to call should issues arise.         Electronically signed by Jessika Seymour

## 2020-10-13 ENCOUNTER — IMMUNIZATION (OUTPATIENT)
Dept: PHARMACY | Facility: CLINIC | Age: 70
End: 2020-10-13

## 2020-10-13 ENCOUNTER — PATIENT OUTREACH (OUTPATIENT)
Dept: OTHER | Facility: OTHER | Age: 70
End: 2020-10-13

## 2020-10-13 ENCOUNTER — IMMUNIZATION (OUTPATIENT)
Dept: PHARMACY | Facility: CLINIC | Age: 70
End: 2020-10-13
Payer: MEDICARE

## 2020-10-13 ENCOUNTER — INITIAL CONSULT (OUTPATIENT)
Dept: RADIATION ONCOLOGY | Facility: CLINIC | Age: 70
End: 2020-10-13
Payer: MEDICARE

## 2020-10-13 VITALS
SYSTOLIC BLOOD PRESSURE: 114 MMHG | RESPIRATION RATE: 19 BRPM | WEIGHT: 238 LBS | TEMPERATURE: 99 F | DIASTOLIC BLOOD PRESSURE: 58 MMHG | OXYGEN SATURATION: 98 % | HEART RATE: 104 BPM | BODY MASS INDEX: 38.25 KG/M2 | HEIGHT: 66 IN

## 2020-10-13 DIAGNOSIS — C34.11 PRIMARY MALIGNANT NEOPLASM OF RIGHT UPPER LOBE OF LUNG: ICD-10-CM

## 2020-10-13 PROCEDURE — 1100F PTFALLS ASSESS-DOCD GE2>/YR: CPT | Mod: CPTII,S$GLB,, | Performed by: RADIOLOGY

## 2020-10-13 PROCEDURE — 3008F PR BODY MASS INDEX (BMI) DOCUMENTED: ICD-10-PCS | Mod: CPTII,S$GLB,, | Performed by: RADIOLOGY

## 2020-10-13 PROCEDURE — 3078F PR MOST RECENT DIASTOLIC BLOOD PRESSURE < 80 MM HG: ICD-10-PCS | Mod: CPTII,S$GLB,, | Performed by: RADIOLOGY

## 2020-10-13 PROCEDURE — 1125F AMNT PAIN NOTED PAIN PRSNT: CPT | Mod: S$GLB,,, | Performed by: RADIOLOGY

## 2020-10-13 PROCEDURE — 1159F PR MEDICATION LIST DOCUMENTED IN MEDICAL RECORD: ICD-10-PCS | Mod: S$GLB,,, | Performed by: RADIOLOGY

## 2020-10-13 PROCEDURE — 3288F PR FALLS RISK ASSESSMENT DOCUMENTED: ICD-10-PCS | Mod: CPTII,S$GLB,, | Performed by: RADIOLOGY

## 2020-10-13 PROCEDURE — 99499 RISK ADDL DX/OHS AUDIT: ICD-10-PCS | Mod: S$GLB,,, | Performed by: RADIOLOGY

## 2020-10-13 PROCEDURE — 99999 PR PBB SHADOW E&M-EST. PATIENT-LVL V: CPT | Mod: PBBFAC,,, | Performed by: RADIOLOGY

## 2020-10-13 PROCEDURE — 99205 PR OFFICE/OUTPT VISIT, NEW, LEVL V, 60-74 MIN: ICD-10-PCS | Mod: S$GLB,,, | Performed by: RADIOLOGY

## 2020-10-13 PROCEDURE — 1159F MED LIST DOCD IN RCRD: CPT | Mod: S$GLB,,, | Performed by: RADIOLOGY

## 2020-10-13 PROCEDURE — 3074F PR MOST RECENT SYSTOLIC BLOOD PRESSURE < 130 MM HG: ICD-10-PCS | Mod: CPTII,S$GLB,, | Performed by: RADIOLOGY

## 2020-10-13 PROCEDURE — 99999 PR PBB SHADOW E&M-EST. PATIENT-LVL V: ICD-10-PCS | Mod: PBBFAC,,, | Performed by: RADIOLOGY

## 2020-10-13 PROCEDURE — 99205 OFFICE O/P NEW HI 60 MIN: CPT | Mod: S$GLB,,, | Performed by: RADIOLOGY

## 2020-10-13 PROCEDURE — 3288F FALL RISK ASSESSMENT DOCD: CPT | Mod: CPTII,S$GLB,, | Performed by: RADIOLOGY

## 2020-10-13 PROCEDURE — 3078F DIAST BP <80 MM HG: CPT | Mod: CPTII,S$GLB,, | Performed by: RADIOLOGY

## 2020-10-13 PROCEDURE — 99499 UNLISTED E&M SERVICE: CPT | Mod: S$GLB,,, | Performed by: RADIOLOGY

## 2020-10-13 PROCEDURE — 3074F SYST BP LT 130 MM HG: CPT | Mod: CPTII,S$GLB,, | Performed by: RADIOLOGY

## 2020-10-13 PROCEDURE — 1100F PR PT FALLS ASSESS DOC 2+ FALLS/FALL W/INJURY/YR: ICD-10-PCS | Mod: CPTII,S$GLB,, | Performed by: RADIOLOGY

## 2020-10-13 PROCEDURE — 1125F PR PAIN SEVERITY QUANTIFIED, PAIN PRESENT: ICD-10-PCS | Mod: S$GLB,,, | Performed by: RADIOLOGY

## 2020-10-13 PROCEDURE — 3008F BODY MASS INDEX DOCD: CPT | Mod: CPTII,S$GLB,, | Performed by: RADIOLOGY

## 2020-10-13 NOTE — PROGRESS NOTES
10/13/2020    Radiation Oncology Consultation    Assessment   This is a 69 y.o. y/o female with likely synchronous primary Stage IA2 (cT1b cN0 M0) RUL NSCLC (adeno; no actionable mutations) diagnosed on biopsy of the spiculated nodule 9/14/20. In addition there is a slightly more superior, pleural-based lesion that has increased in size and solidity. She is not a candidate for resection due to COPD and CHF. She is referred for consideration of treatment options.    I discussed treatment options for early stage NSCLC including surgical resection and stereotactic body radiotherapy (SBRT). Since she is not a surgical candidate, I recommend treating the RUL primaries to 50-55 Gy in 4-5 fractions, which offers local control in 90% of patients. Potential short- and long-term risks of treatment were reviewed with the patient, particularly pneumonitis and chest wall pain. At the end of our discussion, she wished to proceed with the recommended treatment.        Plan   1) Schedule CT Simulation for radiation treatment planning.          Chief Complaint:  Lung cancer      HPI: Ms. Varma is a 70 y/o female smoker with h/o COPD on 2L O2 for several years who underwent screening CT Chest 7/23/20. This demonstrated a spiculated 1.2 cm RUL nodule that was increased from 0.6 cm in 3/2019. There was also a more lateral, pleural based RUL nodule measuring 1.2 cm up from 0.9 cm in 3/2019. Biopsy of the spiculated nodule 9/14/20 revealed well differentiated adenocarcinoma without actionable mutations. PET/CT 10/7/20 was negative for FDG avid regional or distant metastatic disease. She is referred for consideration of treatment options.     In clinic today, the patient is unaccompanied. She has stable dyspnea and cough. She denies fevers, weight, loss, or chest pain. She is currently in smoking cessation program and started chantix.       Prior Radiation History: None    Past Medical History:   Diagnosis Date    Allergy     Anemia  due to gastrointestinal blood loss     LESLY from gastric ulcer due to chronic nsaid use    Anxiety     Arthritis     CKD (chronic kidney disease) stage 3, GFR 30-59 ml/min     followed by [facility] - recieved labs from 2017 and 2/2018    Gastric ulcer     H/O knee surgery 2001    right    Heart failure with preserved ejection fraction 8/20/2018    Hx of psychiatric care     Hyperlipidemia     Hyponatremia     Psychiatric problem     Therapy        Past Surgical History:   Procedure Laterality Date    BREAST CYST EXCISION Right     1967    cataract surgery      COLONOSCOPY  2015    CORONARY ANGIOGRAPHY N/A 7/20/2018    Procedure: ANGIOGRAM, CORONARY ARTERY;  Surgeon: Willard Roberts MD;  Location: [facility] CATH LAB;  Service: Cardiovascular;  Laterality: N/A;    ESOPHAGOGASTRODUODENOSCOPY  2015    EYE SURGERY  2017    FRACTURE SURGERY      left femur    JOINT REPLACEMENT  2007    left knee x 2, 2nd performed 2/2 to MRSA infection 3 months after 1st surgery    ORIF FEMUR FRACTURE Left     TUBAL LIGATION         Social History     Tobacco Use    Smoking status: Current Every Day Smoker     Packs/day: 1.50     Years: 50.00     Pack years: 75.00     Types: Cigarettes    Smokeless tobacco: Never Used   Substance Use Topics    Alcohol use: Yes     Alcohol/week: 7.0 standard drinks     Types: 7 Shots of liquor per week     Frequency: 4 or more times a week     Drinks per session: 1 or 2     Binge frequency: Never     Comment: at least 1 cocktail a day    Drug use: No       Cancer-related family history includes Cancer in her sister; Rectal cancer in her father. There is no history of Melanoma or Breast cancer.    Current Outpatient Medications on File Prior to Visit   Medication Sig Dispense Refill    albuterol (VENTOLIN HFA) 90 mcg/actuation inhaler inhale 1-2 puffs as needed every 6 hours for wheezing and shortness of breath 25.5 g 11    amLODIPine (NORVASC) 10 MG tablet Take 1 tablet (10  mg total) by mouth once daily. 90 tablet 3    atorvastatin (LIPITOR) 40 MG tablet TAKE 1 TABLET BY MOUTH EVERY DAILY 90 tablet 3    biotin 1 mg Cap Take by mouth.      cyanocobalamin 1,000 mcg TbSR Take 1,000 mcg by mouth once daily. (Patient taking differently: Take 1,000 mcg by mouth daily as needed. ) 90 tablet 1    denosumab (PROLIA) 60 mg/mL Syrg Inject 1 mL (60 mg total) into the skin every 6 (six) months. 2 mL 0    famotidine (PEPCID) 20 MG tablet TAKE 1 TABLET BY MOUTH TWO TIMES A  tablet 3    flu vac 2020 65up-srgQO13A,PF, (FLUAD QUAD 2020-21,65Y UP,,PF,) 60 mcg (15 mcg x 4)/0.5 mL Syrg Inject 0.5 mLs into the muscle once. for 1 dose 0.5 mL 0    fluticasone (FLONASE) 50 mcg/actuation nasal spray 1 spray by Each Nare route 2 (two) times daily as needed for Rhinitis.      fluticasone propion-salmeterol 115-21 mcg/dose (ADVAIR HFA) 115-21 mcg/actuation HFAA inhaler Inhale 2 puffs into the lungs every 12 (twelve) hours. 36 g 3    guaiFENesin (MUCINEX) 600 mg 12 hr tablet Take 1,200 mg by mouth 2 (two) times daily as needed for Congestion.      HYDROcodone-acetaminophen (NORCO)  mg per tablet Take 1 tablet by mouth every 12 hours as needed for pain 60 tablet 0    losartan (COZAAR) 100 MG tablet Take 1 tablet (100 mg total) by mouth once daily. 90 tablet 3    magnesium oxide 400 mg Cap Take 1 capsule by mouth every 12 (twelve) hours.      multivit-min/iron/folic/lutein (CENTRUM SILVER WOMEN ORAL) Take 1 tablet by mouth once daily.      nicotine (NICODERM CQ) 21 mg/24 hr Place 1 patch onto the skin once daily. 28 patch 0    nicotine, polacrilex, (NICORETTE) 2 mg Gum Take 1 gum (2 mg total) by mouth as needed (Use in place of cigarettes). 170 each 0    potassium chloride SA (K-DUR,KLOR-CON) 10 MEQ tablet Take 1 tablet (10 mEq total) by mouth once daily. 90 tablet 3    torsemide (DEMADEX) 20 MG Tab Take 2 tablets (40 mg total) by mouth once daily. 180 tablet 3    umeclidinium (INCRUSE  ELLIPTA) 62.5 mcg/actuation inhalation capsule Inhale 1 puff into the lungs once daily. Controller 90 each 3    varenicline (CHANTIX STARTING MONTH BOX) 0.5 mg (11)- 1 mg (42) tablet Take one 0.5mg tab by mouth once daily X3 days,then increase to one 0.5mg tab twice daily X4 days,then increase to one 1mg tab twice daily 53 tablet 0    varicella-zoster gE-AS01B, PF, (SHINGRIX, PF,) 50 mcg/0.5 mL injection Inject 0.5 mLs into the muscle once. for 1 dose 1 each 0    vitamin D 1000 units Tab Take 1,000 Units by mouth once daily.      walker (ULTRA-LIGHT ROLLATOR) Misc 1 each by Misc.(Non-Drug; Combo Route) route once daily. 1 each 0     No current facility-administered medications on file prior to visit.        Review of patient's allergies indicates:   Allergen Reactions    Wellbutrin [bupropion hcl] Other (See Comments)     Hyponatremia and hypomagnesia     Zoloft [sertraline] Other (See Comments)     Hyponatremia and hypomagnesia        Review of Systems   Constitutional: Positive for malaise/fatigue. Negative for fever and weight loss.   HENT: Positive for congestion and hearing loss. Negative for ear pain and sore throat.    Eyes: Negative for blurred vision and double vision.   Respiratory: Positive for cough, shortness of breath and wheezing. Negative for hemoptysis.    Cardiovascular: Positive for leg swelling. Negative for chest pain.   Gastrointestinal: Positive for nausea. Negative for abdominal pain, constipation, diarrhea and heartburn.   Genitourinary: Positive for urgency. Negative for dysuria and hematuria.   Musculoskeletal: Positive for joint pain and neck pain. Negative for falls.   Neurological: Negative for tingling, speech change, focal weakness, seizures and headaches.   Psychiatric/Behavioral: Positive for depression. The patient is nervous/anxious.         Vital Signs: There were no vitals taken for this visit.    ECOG Performance Status: 2 - Ambulates, capable of self care  only    Physical Exam   Constitutional: She is oriented to person, place, and time and well-developed, well-nourished, and in no distress.   HENT:   Head: Normocephalic and atraumatic.   Mouth/Throat: Oropharynx is clear and moist.   Eyes: EOM are normal. No scleral icterus.   Neck: Normal range of motion. Neck supple.   Pulmonary/Chest: No accessory muscle usage. No respiratory distress.   O2 via NC   Abdominal: Soft. Normal appearance. She exhibits no distension.   Musculoskeletal: Normal range of motion.         General: No edema.   Lymphadenopathy:     She has no cervical adenopathy.        Right: No supraclavicular adenopathy present.        Left: No supraclavicular adenopathy present.   Neurological: She is alert and oriented to person, place, and time. No cranial nerve deficit.   Ambulates with rolling walker   Skin: Skin is warm and dry.   Psychiatric: Mood, affect and judgment normal.   Vitals reviewed.       Labs:    Imaging: I have personally reviewed the patient's available images and reports and summarized pertinent findings above in HPI.     Pathology: I have personally reviewed the patient's available pathology and summarized pertinent findings above in HPI.       - Thank you for allowing me to participate in the care of your patient.    Casey Mead MD, PhD

## 2020-10-13 NOTE — PROGRESS NOTES
Digital Medicine: Clinician Follow-Up    Called patient for BP follow up. Patient is doing well today with no new complaints. BP was 174/83 mmHg on 10/8/20. She denies symptoms of hypertension. Patient says she thinks this was inaccurate because she was rushing to take her BP and thinks the cuff may not have been on correctly.     The history is provided by the patient.   Follow-up reason(s): routine follow up.           Last 5 Patient Entered Readings                                      Current 30 Day Average: 144/67     Recent Readings 10/8/2020 9/30/2020 9/30/2020 5/20/2020 4/10/2020    SBP (mmHg) 174 113 124 125 150    DBP (mmHg) 83 67 52 57 78                 Depression Screening  Nalini Varma screened negative on the depression screening.     Sleep Apnea Screening  Patient not previously diagnosed with JENNY       Medication Affordability Screening  Did not address medication affordability screening.     Medication Adherence-Medication adherence was asssessed.  Patient continue taking medication as prescribed.            ASSESSMENT(S)  Patients BP average is 144/67 mmHg, which is above goal. Patient's BP goal is less than or equal to 130/80.   /60 mmHg yesterday in clinic  Hypertension Plan  Continue current therapy.       Addressed patient questions and patient has my contact information if needed prior to next outreach. Patient verbalizes understanding.             There are no preventive care reminders to display for this patient.  There are no preventive care reminders to display for this patient.      Hypertension Medications     amLODIPine (NORVASC) 10 MG tablet Take 1 tablet (10 mg total) by mouth once daily.    losartan (COZAAR) 100 MG tablet Take 1 tablet (100 mg total) by mouth once daily.    torsemide (DEMADEX) 20 MG Tab Take 2 tablets (40 mg total) by mouth once daily.

## 2020-10-13 NOTE — LETTER
October 13, 2020      Jessika Seymour MD  [address]  [address]  [facility] [address]           [place] - Radiation Oncology 3rd Floor  [address]  [facility] [address]  Phone: [phone]  Fax: [phone]          Patient: Nalini Varma   MR Number: [id number]   YOB: 1950   Date of Visit: 10/13/2020       Dear Dr. Jessika Seymour:    Thank you for referring Nalini Varma to me for evaluation. Attached you will find relevant portions of my assessment and plan of care.    If you have questions, please do not hesitate to call me. I look forward to following Nalini Varma along with you.    Sincerely,    Casey Mead MD    Enclosure  CC:  No Recipients    If you would like to receive this communication electronically, please contact [email or web address] or [phone] to request more information on [employer] Link access.    For providers and/or their staff who would like to refer a patient to [facility], please contact us through our one-stop-shop provider referral line, [facility], at [phone].    If you feel you have received this communication in error or would no longer like to receive these types of communications, please e-mail [email or web address]

## 2020-10-21 NOTE — PROGRESS NOTES
Digital Medicine: Health  Follow-Up    The history is provided by the patient.             Reason for review: Blood pressure not at goal      Additional Follow-up details: Patient feels that her most recent reading was accurate due to cuff placement. Encouraged her to continue to charge her monitor as well.     Patient describes that she was recently diagnosed with early stage lung cancer. She will be doing radiation treatments, and describes stressors related to affording rides to and from all her appointments. After evaluating her options in [facility], there do not appear to be any referral options in her area at the moment. Did not mention the option of a referral for this reason.             Diet-no change to diet    No change to diet.        Physical Activity-no change to routine  No change to exercise routine.     Medication Adherence-Medication Adherence not addressed.        Substance, Sleep, Stress-change  stress-assessed  Details:stress/anxiety related to cancer rx  Intervention(s):    Sleep-not assessed  Details:  Intervention(s):    Alcohol -not assessed  Details:  Intervention(s):    Tobacco-Not Assessed  Details:  Intervention(s):          Additional monitoring needed.  Continue current diet/physical activity routine.  Instructed to charge device.  Reviewed Device Techniques.     Addressed patient questions and patient has my contact information if needed prior to next outreach. Patient verbalizes understanding.      Explained the importance of self-monitoring and medication adherence. Encouraged the patient to communicate with their health  for lifestyle modifications to help improve or maintain a healthy lifestyle.               There are no preventive care reminders to display for this patient.      Last 5 Patient Entered Readings                                      Current 30 Day Average: 144/67     Recent Readings 10/8/2020 9/30/2020 9/30/2020 5/20/2020 4/10/2020    SBP (mmHg) 174 113  124 125 150    DBP (mmHg) 83 67 52 57 78

## 2020-10-23 ENCOUNTER — HOSPITAL ENCOUNTER (OUTPATIENT)
Dept: RADIATION THERAPY | Facility: HOSPITAL | Age: 70
Discharge: HOME | End: 2020-10-23
Attending: RADIOLOGY
Payer: MEDICARE

## 2020-10-23 PROCEDURE — 77014 HC CT GUIDANCE RADIATION THERAPY FLDS PLACEMENT: CPT | Mod: TC | Performed by: RADIOLOGY

## 2020-10-23 PROCEDURE — 77263 PR  RADIATION THERAPY PLAN COMPLEX: ICD-10-PCS | Mod: ,,, | Performed by: RADIOLOGY

## 2020-10-23 PROCEDURE — 77334 RADIATION TREATMENT AID(S): CPT | Mod: TC | Performed by: RADIOLOGY

## 2020-10-23 PROCEDURE — 77290 PR  SET RADN THERAPY FIELD COMPLEX: ICD-10-PCS | Mod: 26,,, | Performed by: RADIOLOGY

## 2020-10-23 PROCEDURE — 77290 THER RAD SIMULAJ FIELD CPLX: CPT | Mod: 26,,, | Performed by: RADIOLOGY

## 2020-10-23 PROCEDURE — 77334 PR  RADN TREATMENT AID(S) COMPLX: ICD-10-PCS | Mod: 26,,, | Performed by: RADIOLOGY

## 2020-10-23 PROCEDURE — 77290 THER RAD SIMULAJ FIELD CPLX: CPT | Mod: TC | Performed by: RADIOLOGY

## 2020-10-23 PROCEDURE — 77334 RADIATION TREATMENT AID(S): CPT | Mod: 26,,, | Performed by: RADIOLOGY

## 2020-10-23 PROCEDURE — 77263 THER RADIOLOGY TX PLNG CPLX: CPT | Mod: ,,, | Performed by: RADIOLOGY

## 2020-10-26 ENCOUNTER — CLINICAL SUPPORT (OUTPATIENT)
Dept: SMOKING CESSATION | Facility: CLINIC | Age: 70
End: 2020-10-26
Payer: COMMERCIAL

## 2020-10-26 DIAGNOSIS — F17.210 MODERATE CIGARETTE SMOKER (10-19 PER DAY): Primary | ICD-10-CM

## 2020-10-26 PROCEDURE — 99999 PR PBB SHADOW E&M-EST. PATIENT-LVL II: ICD-10-PCS | Mod: PBBFAC,,,

## 2020-10-26 PROCEDURE — 99999 PR PBB SHADOW E&M-EST. PATIENT-LVL II: CPT | Mod: PBBFAC,,,

## 2020-10-26 PROCEDURE — 99403 PREV MED CNSL INDIV APPRX 45: CPT | Mod: S$GLB,,,

## 2020-10-26 PROCEDURE — 99403 PR PREVENT COUNSEL,INDIV,45 MIN: ICD-10-PCS | Mod: S$GLB,,,

## 2020-10-26 NOTE — PROGRESS NOTES
Individual Follow-Up Form    10/26/2020    Quit Date:     Clinical Status of Patient: Outpatient    Length of Service: 45 minutes    Continuing Medication: yes  Chantix    Other Medications: nicotine patches     Target Symptoms: Withdrawal and medication side effects. The following were  rated moderate (3) to severe (4) on TCRS:  · Moderate (3): none  · Severe (4): none    Comments: Patient is smoking 15-17 cpd down from 20-25cpd. Patient will rate fade down to 15 cpd.    Patient continue to use 1 mg Chantix BID without any negative side effects at this time. Patient continue to use 21 mg nicotine patch daily with reports of them falling off during the day. completion of TCRS (Tobacco Cessation Rating Scale) reviewed strategies, cues, and triggers. Introduced the negative impact of tobacco on health, the health advantages of discontinuing the use of tobacco, time line improved health changes after a quit, withdrawal issues to expect from nicotine and habit, and ways to achieve the goal of a quit.  The patient denies any abnormal behavioral or mental changes at this time. The patient will continue with group therapy sessions and medication monitoring by CTTS. Prescribed medication management will be by physician.         Diagnosis: F17.210    Next Visit: 2 weeks

## 2020-10-28 ENCOUNTER — PATIENT OUTREACH (OUTPATIENT)
Dept: OTHER | Facility: OTHER | Age: 70
End: 2020-10-28

## 2020-10-28 PROCEDURE — 77293 PR RESPIRATORY MOTION MGMT SIMULATION: ICD-10-PCS | Mod: 26,,, | Performed by: RADIOLOGY

## 2020-10-28 PROCEDURE — 77301 RADIOTHERAPY DOSE PLAN IMRT: CPT | Mod: TC | Performed by: RADIOLOGY

## 2020-10-28 PROCEDURE — 77301 PR  INTEN MOD RADIOTHER PLAN W/DOSE VOL HIST: ICD-10-PCS | Mod: 26,,, | Performed by: RADIOLOGY

## 2020-10-28 PROCEDURE — 77293 RESPIRATOR MOTION MGMT SIMUL: CPT | Mod: TC | Performed by: RADIOLOGY

## 2020-10-28 PROCEDURE — 77293 RESPIRATOR MOTION MGMT SIMUL: CPT | Mod: 26,,, | Performed by: RADIOLOGY

## 2020-10-28 PROCEDURE — 77301 RADIOTHERAPY DOSE PLAN IMRT: CPT | Mod: 26,,, | Performed by: RADIOLOGY

## 2020-10-29 ENCOUNTER — DOCUMENTATION ONLY (OUTPATIENT)
Dept: HEMATOLOGY/ONCOLOGY | Facility: CLINIC | Age: 70
End: 2020-10-29

## 2020-10-29 PROCEDURE — 77338 PR  MLC IMRT DESIGN & CONSTRUCTION PER IMRT PLAN: ICD-10-PCS | Mod: 26,,, | Performed by: RADIOLOGY

## 2020-10-29 PROCEDURE — 77470 PR  SPECIAL RADIATION TREATMENT: ICD-10-PCS | Mod: 26,59,, | Performed by: RADIOLOGY

## 2020-10-29 PROCEDURE — 77014 HC CT GUIDANCE RADIATION THERAPY FLDS PLACEMENT: CPT | Mod: TC,59 | Performed by: RADIOLOGY

## 2020-10-29 PROCEDURE — 77470 SPECIAL RADIATION TREATMENT: CPT | Mod: 26,59,, | Performed by: RADIOLOGY

## 2020-10-29 PROCEDURE — 77300 PR RADIATION THERAPY,DOSIMETRY PLAN: ICD-10-PCS | Mod: 26,,, | Performed by: RADIOLOGY

## 2020-10-29 PROCEDURE — 77300 RADIATION THERAPY DOSE PLAN: CPT | Mod: TC | Performed by: RADIOLOGY

## 2020-10-29 PROCEDURE — 77300 RADIATION THERAPY DOSE PLAN: CPT | Mod: 26,,, | Performed by: RADIOLOGY

## 2020-10-29 PROCEDURE — 77470 SPECIAL RADIATION TREATMENT: CPT | Mod: 59,TC | Performed by: RADIOLOGY

## 2020-10-29 PROCEDURE — 77373 STRTCTC BDY RAD THER TX DLVR: CPT | Performed by: RADIOLOGY

## 2020-10-29 PROCEDURE — 77014 PR  CT GUIDANCE PLACEMENT RAD THERAPY FIELDS: ICD-10-PCS | Mod: 26,,, | Performed by: RADIOLOGY

## 2020-10-29 PROCEDURE — 77014 PR  CT GUIDANCE PLACEMENT RAD THERAPY FIELDS: CPT | Mod: 26,,, | Performed by: RADIOLOGY

## 2020-10-29 PROCEDURE — 77338 DESIGN MLC DEVICE FOR IMRT: CPT | Mod: TC | Performed by: RADIOLOGY

## 2020-10-29 PROCEDURE — 77338 DESIGN MLC DEVICE FOR IMRT: CPT | Mod: 26,,, | Performed by: RADIOLOGY

## 2020-10-29 NOTE — PROGRESS NOTES
Received referral from the clinic that the patient needed to be set up radiation transportation. Completed form. Patient needs 2:45pm  for 10/30,11/2,11/3,and 11/4. Completed the request form and faxed it to [place] [employer].

## 2020-10-30 PROCEDURE — 77014 PR  CT GUIDANCE PLACEMENT RAD THERAPY FIELDS: CPT | Mod: 26,,, | Performed by: RADIOLOGY

## 2020-10-30 PROCEDURE — 77373 STRTCTC BDY RAD THER TX DLVR: CPT | Performed by: RADIOLOGY

## 2020-10-30 PROCEDURE — 77014 PR  CT GUIDANCE PLACEMENT RAD THERAPY FIELDS: ICD-10-PCS | Mod: 26,,, | Performed by: RADIOLOGY

## 2020-10-30 PROCEDURE — 77014 HC CT GUIDANCE RADIATION THERAPY FLDS PLACEMENT: CPT | Mod: TC | Performed by: RADIOLOGY

## 2020-11-02 ENCOUNTER — HOSPITAL ENCOUNTER (OUTPATIENT)
Dept: RADIATION THERAPY | Facility: HOSPITAL | Age: 70
Discharge: HOME | End: 2020-11-02
Attending: RADIOLOGY
Payer: MEDICARE

## 2020-11-02 PROCEDURE — 77014 PR  CT GUIDANCE PLACEMENT RAD THERAPY FIELDS: CPT | Mod: 26,,, | Performed by: RADIOLOGY

## 2020-11-02 PROCEDURE — 77014 PR  CT GUIDANCE PLACEMENT RAD THERAPY FIELDS: ICD-10-PCS | Mod: 26,,, | Performed by: RADIOLOGY

## 2020-11-02 PROCEDURE — 77373 STRTCTC BDY RAD THER TX DLVR: CPT | Performed by: RADIOLOGY

## 2020-11-02 PROCEDURE — 77014 HC CT GUIDANCE RADIATION THERAPY FLDS PLACEMENT: CPT | Mod: TC,59 | Performed by: RADIOLOGY

## 2020-11-03 PROCEDURE — 77014 HC CT GUIDANCE RADIATION THERAPY FLDS PLACEMENT: CPT | Mod: TC,59 | Performed by: RADIOLOGY

## 2020-11-03 PROCEDURE — 77373 STRTCTC BDY RAD THER TX DLVR: CPT | Performed by: RADIOLOGY

## 2020-11-03 PROCEDURE — 77014 PR  CT GUIDANCE PLACEMENT RAD THERAPY FIELDS: CPT | Mod: 26,,, | Performed by: RADIOLOGY

## 2020-11-03 PROCEDURE — 77014 PR  CT GUIDANCE PLACEMENT RAD THERAPY FIELDS: ICD-10-PCS | Mod: 26,,, | Performed by: RADIOLOGY

## 2020-11-04 ENCOUNTER — PATIENT MESSAGE (OUTPATIENT)
Dept: INTERNAL MEDICINE | Facility: CLINIC | Age: 70
End: 2020-11-04

## 2020-11-04 DIAGNOSIS — C34.11 PRIMARY MALIGNANT NEOPLASM OF RIGHT UPPER LOBE OF LUNG: Primary | ICD-10-CM

## 2020-11-04 DIAGNOSIS — G89.4 CHRONIC PAIN DISORDER: ICD-10-CM

## 2020-11-04 DIAGNOSIS — R11.0 NAUSEA: ICD-10-CM

## 2020-11-04 PROCEDURE — 77373 STRTCTC BDY RAD THER TX DLVR: CPT | Performed by: RADIOLOGY

## 2020-11-04 PROCEDURE — 77014 PR  CT GUIDANCE PLACEMENT RAD THERAPY FIELDS: ICD-10-PCS | Mod: 26,,, | Performed by: RADIOLOGY

## 2020-11-04 PROCEDURE — 77014 PR  CT GUIDANCE PLACEMENT RAD THERAPY FIELDS: CPT | Mod: 26,,, | Performed by: RADIOLOGY

## 2020-11-04 PROCEDURE — 77014 HC CT GUIDANCE RADIATION THERAPY FLDS PLACEMENT: CPT | Mod: TC | Performed by: RADIOLOGY

## 2020-11-04 RX ORDER — HYDROCODONE BITARTRATE AND ACETAMINOPHEN 10; 325 MG/1; MG/1
1 TABLET ORAL EVERY 12 HOURS
Qty: 60 TABLET | Refills: 0 | Status: SHIPPED | OUTPATIENT
Start: 2020-11-04 | End: 2020-12-03 | Stop reason: SDUPTHER

## 2020-11-04 RX ORDER — ONDANSETRON HYDROCHLORIDE 8 MG/1
8 TABLET, FILM COATED ORAL EVERY 8 HOURS PRN
Qty: 30 TABLET | Refills: 2 | Status: ON HOLD | OUTPATIENT
Start: 2020-11-04 | End: 2021-08-27 | Stop reason: HOSPADM

## 2020-11-05 PROCEDURE — 77336 RADIATION PHYSICS CONSULT: CPT | Performed by: RADIOLOGY

## 2020-11-10 ENCOUNTER — CLINICAL SUPPORT (OUTPATIENT)
Dept: SMOKING CESSATION | Facility: CLINIC | Age: 70
End: 2020-11-10
Payer: MEDICARE

## 2020-11-10 DIAGNOSIS — F17.210 HEAVY CIGARETTE SMOKER (20-39 PER DAY): ICD-10-CM

## 2020-11-10 DIAGNOSIS — F17.210 MODERATE CIGARETTE SMOKER (10-19 PER DAY): Primary | ICD-10-CM

## 2020-11-10 PROCEDURE — 99999 PR PBB SHADOW E&M-EST. PATIENT-LVL I: ICD-10-PCS | Mod: PBBFAC,,,

## 2020-11-10 PROCEDURE — 99999 PR PBB SHADOW E&M-EST. PATIENT-LVL I: CPT | Mod: PBBFAC,,,

## 2020-11-10 PROCEDURE — 99403 PREV MED CNSL INDIV APPRX 45: CPT | Mod: S$GLB,,,

## 2020-11-10 PROCEDURE — 99403 PR PREVENT COUNSEL,INDIV,45 MIN: ICD-10-PCS | Mod: S$GLB,,,

## 2020-11-10 RX ORDER — VARENICLINE TARTRATE 1 MG/1
1 TABLET, FILM COATED ORAL DAILY
Qty: 56 TABLET | Refills: 0 | Status: SHIPPED | OUTPATIENT
Start: 2020-11-10 | End: 2021-01-19 | Stop reason: SDUPTHER

## 2020-11-10 RX ORDER — IBUPROFEN 200 MG
1 TABLET ORAL DAILY
Qty: 28 PATCH | Refills: 0 | Status: SHIPPED | OUTPATIENT
Start: 2020-11-10 | End: 2021-03-23

## 2020-11-10 NOTE — PROGRESS NOTES
Individual Follow-Up Form    11/10/2020    Quit Date:     Clinical Status of Patient: Outpatient    Length of Service: 45 minutes    Continuing Medication: yes  Chantix    Other Medications: nicotine patch     Target Symptoms: Withdrawal and medication side effects. The following were  rated moderate (3) to severe (4) on TCRS:  · Moderate (3): none  · Severe (4): none    Comments: Patient is smoking 15 cpd this week.Patient was encouraged to rate fade to 10 cpd before follow up.  Patient continue to use 1mg Chantix BID and  21 mg nicotine patch daily without any negative side effects at this time.   Completion of TCRS (Tobacco Cessation Rating Scale) reviewed strategies, controlling environment, cues, triggers, new goals set. Introduced high risk situations with preparation interventions, caffeine similarities with withdrawal issues of habit and nicotine, Alcohol, Understanding urges, cravings, stress and relaxation. Open discussion with intervention discussion. The patient denies any abnormal behavioral or mental changes at this time. The patient will continue with group therapy sessions and medication monitoring by CTTS. Prescribed medication management will be by physician.         Diagnosis: F17.210    Next Visit: 2 weeks

## 2020-11-10 NOTE — Clinical Note
Patient is smoking 15 cpd this week.Patient was encouraged to rate fade to 10 cpd before follow up.  Patient continue to use 1mg Chantix BID and  21 mg nicotine patch daily without any negative side effects at this time.   Completion of TCRS (Tobacco Cessation Rating Scale) reviewed strategies, controlling environment, cues, triggers, new goals set. Introduced high risk situations with preparation interventions, caffeine similarities with withdrawal issues of habit and nicotine, Alcohol, Understanding urges, cravings, stress and relaxation. Open discussion with intervention discussion. The patient denies any abnormal behavioral or mental changes at this time. The patient will continue with group therapy sessions and medication monitoring by CTTS. Prescribed medication management will be by physician.

## 2020-11-13 ENCOUNTER — TELEPHONE (OUTPATIENT)
Dept: RADIATION ONCOLOGY | Facility: CLINIC | Age: 70
End: 2020-11-13

## 2020-11-13 NOTE — TELEPHONE ENCOUNTER
followup call after completing radiation to the lung states she is doing well f/u appt and scans confirmed

## 2020-11-13 NOTE — TELEPHONE ENCOUNTER
----- Message from Rabia Adair RN sent at 11/6/2020  2:16 PM CST -----  Ct in 3 monts at [facility]

## 2020-11-16 ENCOUNTER — PATIENT MESSAGE (OUTPATIENT)
Dept: INTERNAL MEDICINE | Facility: CLINIC | Age: 70
End: 2020-11-16

## 2020-11-16 DIAGNOSIS — J42 CHRONIC BRONCHITIS, UNSPECIFIED CHRONIC BRONCHITIS TYPE: ICD-10-CM

## 2020-11-16 NOTE — TELEPHONE ENCOUNTER
No new care gaps identified.  Powered by [employer]. Reference number: [id number]. 11/16/2020 11:26:28 AM   CST

## 2020-11-17 DIAGNOSIS — J42 CHRONIC BRONCHITIS, UNSPECIFIED CHRONIC BRONCHITIS TYPE: ICD-10-CM

## 2020-11-17 NOTE — TELEPHONE ENCOUNTER
Refill Routing Note   Medication(s) are not appropriate for processing by [facility] for the following reason(s):     - Patient has been hospitalized since the last PCP visit    [facility] actions taken in this encounter: Defer    Will follow up with your staff to schedule appointment after your decision.    Medication-related problems identified: Requires appointment  Medication Therapy Plan: NEEDS APPT(HOSPITAL FOLLOW UP); HOSPITAL VISIT(9/4/20-Chronic obstructive pulmonary disease)  Medication reconciliation completed: No   Automatic Epic Generated Protocol Data:        Requested Prescriptions   Pending Prescriptions Disp Refills    umeclidinium (INCRUSE ELLIPTA) 62.5 mcg/actuation inhalation capsule 90 each 2     Sig: Inhale 1 puff into the lungs once daily. Controller       Pulmonology:  Anticholinergic Agents Passed - 11/16/2020 11:25 AM        Passed - Patient is at least 18 years old        Passed - Office visit in past 12 months or future 90 days     Recent Outpatient Visits            1 month ago Primary adenocarcinoma of right lung    [facility] [address] Jessika Seymour MD    1 month ago Malignant neoplasm of upper lobe of right lung    [facility] [address] Jessika Seymour MD    2 months ago Simple chronic bronchitis    [facility] 9[facility] Fl Vivian Dennis, [facility]    2 months ago Heart failure with preserved ejection fraction, borderline, class III    [facility] 3rd [facility] Parvez Ortez MD    3 months ago Chronic anemia    [facility] [address] Jessika Seymour MD          Future Appointments              In 2 months [facility] CT OP LIMIT 450 LBS [facility] 1st Fl, [facility]    In 2 months Casey Mead MD [facility] 1st Fl, [facility]                      Appointments  past 12m or future 3m with PCP    Date Provider   Last Visit   7/24/2020 Yane Sahni MD   Next Visit   Visit date not found  Yane Sahni MD   ED visits in past 90 days: [unfilled]        Note composed:7:19 AM 11/17/2020

## 2020-11-17 NOTE — TELEPHONE ENCOUNTER
No new care gaps identified.  Powered by [employer]. Reference number: [id number]. 11/17/2020 3:00:09 PM   CST

## 2020-11-18 NOTE — TELEPHONE ENCOUNTER
Provider Staff:     Action is required for this patient.     Please schedule patient for Follow up (Hospital follow up)    Thanks!  [facility]     Appointments  past 12m or future 3m with PCP    Date Provider   Last Visit   7/24/2020 Yane Sahni MD   Next Visit   11/17/2020 Yane Sahni MD     Note composed:8:51 AM 11/18/2020

## 2020-12-03 ENCOUNTER — OFFICE VISIT (OUTPATIENT)
Dept: INTERNAL MEDICINE | Facility: CLINIC | Age: 70
End: 2020-12-03
Attending: INTERNAL MEDICINE
Payer: MEDICARE

## 2020-12-03 DIAGNOSIS — F11.20 UNCOMPLICATED OPIOID DEPENDENCE: ICD-10-CM

## 2020-12-03 DIAGNOSIS — I10 HYPERTENSION, BENIGN: Primary | ICD-10-CM

## 2020-12-03 DIAGNOSIS — N18.30 STAGE 3 CHRONIC KIDNEY DISEASE, UNSPECIFIED WHETHER STAGE 3A OR 3B CKD: ICD-10-CM

## 2020-12-03 DIAGNOSIS — E78.5 HYPERLIPIDEMIA, UNSPECIFIED HYPERLIPIDEMIA TYPE: ICD-10-CM

## 2020-12-03 DIAGNOSIS — G89.29 ENCOUNTER FOR CHRONIC PAIN MANAGEMENT: ICD-10-CM

## 2020-12-03 DIAGNOSIS — G89.4 CHRONIC PAIN DISORDER: ICD-10-CM

## 2020-12-03 PROCEDURE — 99214 OFFICE O/P EST MOD 30 MIN: CPT | Mod: 95,,, | Performed by: INTERNAL MEDICINE

## 2020-12-03 PROCEDURE — 1159F MED LIST DOCD IN RCRD: CPT | Mod: 95,,, | Performed by: INTERNAL MEDICINE

## 2020-12-03 PROCEDURE — 1159F PR MEDICATION LIST DOCUMENTED IN MEDICAL RECORD: ICD-10-PCS | Mod: 95,,, | Performed by: INTERNAL MEDICINE

## 2020-12-03 PROCEDURE — 99214 PR OFFICE/OUTPT VISIT, EST, LEVL IV, 30-39 MIN: ICD-10-PCS | Mod: 95,,, | Performed by: INTERNAL MEDICINE

## 2020-12-03 RX ORDER — LOSARTAN POTASSIUM 100 MG/1
100 TABLET ORAL DAILY
Qty: 90 TABLET | Refills: 3 | Status: ON HOLD | OUTPATIENT
Start: 2020-12-03 | End: 2021-03-27 | Stop reason: SDUPTHER

## 2020-12-03 RX ORDER — POTASSIUM CHLORIDE 750 MG/1
10 TABLET, EXTENDED RELEASE ORAL DAILY
Qty: 90 TABLET | Refills: 3 | Status: ON HOLD | OUTPATIENT
Start: 2020-12-03 | End: 2021-09-07 | Stop reason: SDUPTHER

## 2020-12-03 RX ORDER — HYDROCODONE BITARTRATE AND ACETAMINOPHEN 10; 325 MG/1; MG/1
1 TABLET ORAL EVERY 12 HOURS
Qty: 60 TABLET | Refills: 0 | Status: SHIPPED | OUTPATIENT
Start: 2020-12-03 | End: 2021-01-04 | Stop reason: SDUPTHER

## 2020-12-03 RX ORDER — ATORVASTATIN CALCIUM 40 MG/1
TABLET, FILM COATED ORAL
Qty: 90 TABLET | Refills: 3 | Status: ON HOLD | OUTPATIENT
Start: 2020-12-03 | End: 2021-09-07 | Stop reason: SDUPTHER

## 2020-12-03 NOTE — TELEPHONE ENCOUNTER
No new care gaps identified.  Powered by [employer]. Reference number: [id number]. 12/03/2020 2:16:37 AM CST

## 2020-12-04 ENCOUNTER — PATIENT MESSAGE (OUTPATIENT)
Dept: INTERNAL MEDICINE | Facility: CLINIC | Age: 70
End: 2020-12-04

## 2020-12-07 VITALS — SYSTOLIC BLOOD PRESSURE: 139 MMHG | DIASTOLIC BLOOD PRESSURE: 84 MMHG

## 2020-12-09 ENCOUNTER — PATIENT MESSAGE (OUTPATIENT)
Dept: INTERNAL MEDICINE | Facility: CLINIC | Age: 70
End: 2020-12-09

## 2020-12-10 ENCOUNTER — PATIENT MESSAGE (OUTPATIENT)
Dept: INTERNAL MEDICINE | Facility: CLINIC | Age: 70
End: 2020-12-10

## 2020-12-15 ENCOUNTER — CLINICAL SUPPORT (OUTPATIENT)
Dept: SMOKING CESSATION | Facility: CLINIC | Age: 70
End: 2020-12-15
Payer: COMMERCIAL

## 2020-12-15 ENCOUNTER — LAB VISIT (OUTPATIENT)
Dept: LAB | Facility: OTHER | Age: 70
End: 2020-12-15
Attending: INTERNAL MEDICINE
Payer: MEDICARE

## 2020-12-15 DIAGNOSIS — E78.5 HYPERLIPIDEMIA, UNSPECIFIED HYPERLIPIDEMIA TYPE: ICD-10-CM

## 2020-12-15 DIAGNOSIS — I10 HYPERTENSION, BENIGN: ICD-10-CM

## 2020-12-15 DIAGNOSIS — F17.210 LIGHT SMOKER: Primary | ICD-10-CM

## 2020-12-15 LAB
ALBUMIN SERPL BCP-MCNC: 3.3 G/DL (ref 3.5–5.2)
ALP SERPL-CCNC: 88 U/L (ref 55–135)
ALT SERPL W/O P-5'-P-CCNC: 21 U/L (ref 10–44)
ANION GAP SERPL CALC-SCNC: 14 MMOL/L (ref 8–16)
AST SERPL-CCNC: 20 U/L (ref 10–40)
BASOPHILS # BLD AUTO: 0.04 K/UL (ref 0–0.2)
BASOPHILS NFR BLD: 0.5 % (ref 0–1.9)
BILIRUB SERPL-MCNC: 0.6 MG/DL (ref 0.1–1)
BUN SERPL-MCNC: 15 MG/DL (ref 8–23)
CALCIUM SERPL-MCNC: 8.4 MG/DL (ref 8.7–10.5)
CHLORIDE SERPL-SCNC: 96 MMOL/L (ref 95–110)
CHOLEST SERPL-MCNC: 148 MG/DL (ref 120–199)
CHOLEST/HDLC SERPL: 1.9 {RATIO} (ref 2–5)
CO2 SERPL-SCNC: 23 MMOL/L (ref 23–29)
CREAT SERPL-MCNC: 1.1 MG/DL (ref 0.5–1.4)
DIFFERENTIAL METHOD: ABNORMAL
EOSINOPHIL # BLD AUTO: 0.1 K/UL (ref 0–0.5)
EOSINOPHIL NFR BLD: 1.7 % (ref 0–8)
ERYTHROCYTE [DISTWIDTH] IN BLOOD BY AUTOMATED COUNT: 14.6 % (ref 11.5–14.5)
EST. GFR  (AFRICAN AMERICAN): 58 ML/MIN/1.73 M^2
EST. GFR  (NON AFRICAN AMERICAN): 51 ML/MIN/1.73 M^2
GLUCOSE SERPL-MCNC: 97 MG/DL (ref 70–110)
HCT VFR BLD AUTO: 33.1 % (ref 37–48.5)
HDLC SERPL-MCNC: 79 MG/DL (ref 40–75)
HDLC SERPL: 53.4 % (ref 20–50)
HGB BLD-MCNC: 10.6 G/DL (ref 12–16)
IMM GRANULOCYTES # BLD AUTO: 0.03 K/UL (ref 0–0.04)
IMM GRANULOCYTES NFR BLD AUTO: 0.4 % (ref 0–0.5)
LDLC SERPL CALC-MCNC: 49.2 MG/DL (ref 63–159)
LYMPHOCYTES # BLD AUTO: 1.2 K/UL (ref 1–4.8)
LYMPHOCYTES NFR BLD: 15.8 % (ref 18–48)
MCH RBC QN AUTO: 30.4 PG (ref 27–31)
MCHC RBC AUTO-ENTMCNC: 32 G/DL (ref 32–36)
MCV RBC AUTO: 95 FL (ref 82–98)
MONOCYTES # BLD AUTO: 0.7 K/UL (ref 0.3–1)
MONOCYTES NFR BLD: 9.1 % (ref 4–15)
NEUTROPHILS # BLD AUTO: 5.7 K/UL (ref 1.8–7.7)
NEUTROPHILS NFR BLD: 72.5 % (ref 38–73)
NONHDLC SERPL-MCNC: 69 MG/DL
NRBC BLD-RTO: 0 /100 WBC
PLATELET # BLD AUTO: 243 K/UL (ref 150–350)
PMV BLD AUTO: 9.9 FL (ref 9.2–12.9)
POTASSIUM SERPL-SCNC: 4.2 MMOL/L (ref 3.5–5.1)
PROT SERPL-MCNC: 6.3 G/DL (ref 6–8.4)
RBC # BLD AUTO: 3.49 M/UL (ref 4–5.4)
SODIUM SERPL-SCNC: 133 MMOL/L (ref 136–145)
TRIGL SERPL-MCNC: 99 MG/DL (ref 30–150)
TSH SERPL DL<=0.005 MIU/L-ACNC: 1.21 UIU/ML (ref 0.4–4)
WBC # BLD AUTO: 7.87 K/UL (ref 3.9–12.7)

## 2020-12-15 PROCEDURE — 99404 PREV MED CNSL INDIV APPRX 60: CPT | Mod: S$GLB,,,

## 2020-12-15 PROCEDURE — 36415 COLL VENOUS BLD VENIPUNCTURE: CPT

## 2020-12-15 PROCEDURE — 99404 PR PREVENT COUNSEL,INDIV,60 MIN: ICD-10-PCS | Mod: S$GLB,,,

## 2020-12-15 PROCEDURE — 84443 ASSAY THYROID STIM HORMONE: CPT

## 2020-12-15 PROCEDURE — 99999 PR PBB SHADOW E&M-EST. PATIENT-LVL I: ICD-10-PCS | Mod: PBBFAC,,,

## 2020-12-15 PROCEDURE — 85025 COMPLETE CBC W/AUTO DIFF WBC: CPT

## 2020-12-15 PROCEDURE — 80053 COMPREHEN METABOLIC PANEL: CPT

## 2020-12-15 PROCEDURE — 80061 LIPID PANEL: CPT

## 2020-12-15 PROCEDURE — 99999 PR PBB SHADOW E&M-EST. PATIENT-LVL I: CPT | Mod: PBBFAC,,,

## 2020-12-15 NOTE — PROGRESS NOTES
Individual Follow-Up Form    12/15/2020    Quit Date:     Clinical Status of Patient: Outpatient    Length of Service: 60 minutes    Continuing Medication: yes  Chantix    Other Medications: nicotine patch     Target Symptoms: Withdrawal and medication side effects. The following were  rated moderate (3) to severe (4) on TCRS:  · Moderate (3): none  · Severe (4): none    Comments: Patient is smoking 3-5cpd.  Patient set quit date for 1/1/21. The patient remains on the prescribed tobacco cessation medication regimen of 1 mg Chantix BID without any negative side effects at this time. Patient continue to use 21 mg nicotine patch as tolerated do to itching during some use.  The patient denies any abnormal behavioral or mental changes at this time. The patient will continue with therapy sessions and medication monitoring by CTTS. Prescribed medication management will be by physician. Completion of TCRS (Tobacco Cessation Rating Scale) reviewed strategies, habitual behavior, stress, and high risk situations. Introduced stress with addition interventions, SOLVE, relaxation with interventions, nutrition, exercise, weight gain, and the importance of rewarding oneself for accomplishments toward becoming tobacco free. Open discussion of all items with interventions.           Diagnosis: F17.210    Next Visit: 1 week

## 2020-12-28 ENCOUNTER — TELEPHONE (OUTPATIENT)
Dept: SMOKING CESSATION | Facility: CLINIC | Age: 70
End: 2020-12-28

## 2020-12-31 ENCOUNTER — PATIENT MESSAGE (OUTPATIENT)
Dept: ADMINISTRATIVE | Facility: OTHER | Age: 70
End: 2020-12-31

## 2021-01-04 ENCOUNTER — PATIENT MESSAGE (OUTPATIENT)
Dept: INTERNAL MEDICINE | Facility: CLINIC | Age: 71
End: 2021-01-04

## 2021-01-04 DIAGNOSIS — G89.4 CHRONIC PAIN DISORDER: ICD-10-CM

## 2021-01-04 RX ORDER — HYDROCODONE BITARTRATE AND ACETAMINOPHEN 10; 325 MG/1; MG/1
1 TABLET ORAL EVERY 12 HOURS
Qty: 60 TABLET | Refills: 0 | Status: SHIPPED | OUTPATIENT
Start: 2021-01-04 | End: 2021-02-02 | Stop reason: SDUPTHER

## 2021-01-05 ENCOUNTER — CLINICAL SUPPORT (OUTPATIENT)
Dept: SMOKING CESSATION | Facility: CLINIC | Age: 71
End: 2021-01-05
Payer: COMMERCIAL

## 2021-01-05 ENCOUNTER — PATIENT MESSAGE (OUTPATIENT)
Dept: INTERNAL MEDICINE | Facility: CLINIC | Age: 71
End: 2021-01-05

## 2021-01-05 ENCOUNTER — TELEPHONE (OUTPATIENT)
Dept: SMOKING CESSATION | Facility: CLINIC | Age: 71
End: 2021-01-05

## 2021-01-05 DIAGNOSIS — J42 CHRONIC BRONCHITIS, UNSPECIFIED CHRONIC BRONCHITIS TYPE: ICD-10-CM

## 2021-01-05 DIAGNOSIS — F17.210 LIGHT CIGARETTE SMOKER (1-9 CIGS/DAY): Primary | ICD-10-CM

## 2021-01-05 PROCEDURE — 99401 PREV MED CNSL INDIV APPRX 15: CPT | Mod: S$GLB,,,

## 2021-01-05 PROCEDURE — 99401 PR PREVENT COUNSEL,INDIV,15 MIN: ICD-10-PCS | Mod: S$GLB,,,

## 2021-01-05 RX ORDER — FLUTICASONE PROPIONATE AND SALMETEROL XINAFOATE 115; 21 UG/1; UG/1
2 AEROSOL, METERED RESPIRATORY (INHALATION) EVERY 12 HOURS
Qty: 36 G | Refills: 3 | Status: ON HOLD | OUTPATIENT
Start: 2021-01-05 | End: 2021-09-07 | Stop reason: SDUPTHER

## 2021-01-06 ENCOUNTER — PATIENT MESSAGE (OUTPATIENT)
Dept: PHARMACY | Facility: CLINIC | Age: 71
End: 2021-01-06

## 2021-01-06 RX ORDER — FLUTICASONE PROPIONATE AND SALMETEROL XINAFOATE 115; 21 UG/1; UG/1
2 AEROSOL, METERED RESPIRATORY (INHALATION) EVERY 12 HOURS
Qty: 36 G | Refills: 3 | Status: SHIPPED | OUTPATIENT
Start: 2021-01-06 | End: 2021-03-23

## 2021-01-14 ENCOUNTER — SPECIALTY PHARMACY (OUTPATIENT)
Dept: PHARMACY | Facility: CLINIC | Age: 71
End: 2021-01-14

## 2021-01-19 ENCOUNTER — CLINICAL SUPPORT (OUTPATIENT)
Dept: SMOKING CESSATION | Facility: CLINIC | Age: 71
End: 2021-01-19
Payer: COMMERCIAL

## 2021-01-19 DIAGNOSIS — F17.210 LIGHT CIGARETTE SMOKER (1-9 CIGS/DAY): Primary | ICD-10-CM

## 2021-01-19 DIAGNOSIS — F17.210 HEAVY CIGARETTE SMOKER (20-39 PER DAY): ICD-10-CM

## 2021-01-19 PROCEDURE — 99402 PR PREVENT COUNSEL,INDIV,30 MIN: ICD-10-PCS | Mod: S$GLB,,,

## 2021-01-19 PROCEDURE — 99402 PREV MED CNSL INDIV APPRX 30: CPT | Mod: S$GLB,,,

## 2021-01-19 PROCEDURE — 99999 PR PBB SHADOW E&M-EST. PATIENT-LVL I: ICD-10-PCS | Mod: PBBFAC,,,

## 2021-01-19 PROCEDURE — 99999 PR PBB SHADOW E&M-EST. PATIENT-LVL I: CPT | Mod: PBBFAC,,,

## 2021-01-19 RX ORDER — VARENICLINE TARTRATE 1 MG/1
1 TABLET, FILM COATED ORAL DAILY
Qty: 56 TABLET | Refills: 0 | Status: SHIPPED | OUTPATIENT
Start: 2021-01-19 | End: 2021-01-19 | Stop reason: SDUPTHER

## 2021-01-19 RX ORDER — VARENICLINE TARTRATE 1 MG/1
1 TABLET, FILM COATED ORAL DAILY
Qty: 56 TABLET | Refills: 0 | Status: ON HOLD | OUTPATIENT
Start: 2021-01-19 | End: 2021-08-27 | Stop reason: HOSPADM

## 2021-01-22 ENCOUNTER — SPECIALTY PHARMACY (OUTPATIENT)
Dept: PHARMACY | Facility: CLINIC | Age: 71
End: 2021-01-22

## 2021-01-22 ENCOUNTER — PATIENT MESSAGE (OUTPATIENT)
Dept: INTERNAL MEDICINE | Facility: CLINIC | Age: 71
End: 2021-01-22

## 2021-01-26 ENCOUNTER — PATIENT MESSAGE (OUTPATIENT)
Dept: INTERNAL MEDICINE | Facility: CLINIC | Age: 71
End: 2021-01-26

## 2021-02-01 ENCOUNTER — PATIENT MESSAGE (OUTPATIENT)
Dept: INTERNAL MEDICINE | Facility: CLINIC | Age: 71
End: 2021-02-01

## 2021-02-02 ENCOUNTER — PATIENT MESSAGE (OUTPATIENT)
Dept: INTERNAL MEDICINE | Facility: CLINIC | Age: 71
End: 2021-02-02

## 2021-02-02 DIAGNOSIS — G89.4 CHRONIC PAIN DISORDER: ICD-10-CM

## 2021-02-03 ENCOUNTER — PATIENT MESSAGE (OUTPATIENT)
Dept: INTERNAL MEDICINE | Facility: CLINIC | Age: 71
End: 2021-02-03

## 2021-02-03 RX ORDER — HYDROCODONE BITARTRATE AND ACETAMINOPHEN 10; 325 MG/1; MG/1
1 TABLET ORAL EVERY 12 HOURS
Qty: 60 TABLET | Refills: 0 | Status: ON HOLD | OUTPATIENT
Start: 2021-02-03 | End: 2021-03-04 | Stop reason: SDUPTHER

## 2021-02-07 ENCOUNTER — PATIENT OUTREACH (OUTPATIENT)
Dept: ADMINISTRATIVE | Facility: OTHER | Age: 71
End: 2021-02-07

## 2021-02-10 ENCOUNTER — OFFICE VISIT (OUTPATIENT)
Dept: CARDIOLOGY | Facility: CLINIC | Age: 71
End: 2021-02-10
Payer: MEDICARE

## 2021-02-10 DIAGNOSIS — F17.200 TOBACCO DEPENDENCE: ICD-10-CM

## 2021-02-10 DIAGNOSIS — E66.9 OBESITY (BMI 35.0-39.9 WITHOUT COMORBIDITY): ICD-10-CM

## 2021-02-10 DIAGNOSIS — J96.11 CHRONIC RESPIRATORY FAILURE WITH HYPOXIA: ICD-10-CM

## 2021-02-10 DIAGNOSIS — E78.5 HYPERLIPIDEMIA, UNSPECIFIED HYPERLIPIDEMIA TYPE: ICD-10-CM

## 2021-02-10 DIAGNOSIS — J44.9 CHRONIC OBSTRUCTIVE PULMONARY DISEASE, UNSPECIFIED COPD TYPE: ICD-10-CM

## 2021-02-10 DIAGNOSIS — I50.30 HEART FAILURE WITH PRESERVED EJECTION FRACTION, BORDERLINE, CLASS III: Primary | ICD-10-CM

## 2021-02-10 DIAGNOSIS — I10 HYPERTENSION, BENIGN: ICD-10-CM

## 2021-02-10 DIAGNOSIS — G89.28 CHRONIC PAIN FOLLOWING SURGERY OR PROCEDURE: ICD-10-CM

## 2021-02-10 PROCEDURE — 1159F MED LIST DOCD IN RCRD: CPT | Mod: 95,,, | Performed by: INTERNAL MEDICINE

## 2021-02-10 PROCEDURE — 99213 PR OFFICE/OUTPT VISIT, EST, LEVL III, 20-29 MIN: ICD-10-PCS | Mod: 95,,, | Performed by: INTERNAL MEDICINE

## 2021-02-10 PROCEDURE — 99499 RISK ADDL DX/OHS AUDIT: ICD-10-PCS | Mod: 95,,, | Performed by: INTERNAL MEDICINE

## 2021-02-10 PROCEDURE — 99499 UNLISTED E&M SERVICE: CPT | Mod: 95,,, | Performed by: INTERNAL MEDICINE

## 2021-02-10 PROCEDURE — 99213 OFFICE O/P EST LOW 20 MIN: CPT | Mod: 95,,, | Performed by: INTERNAL MEDICINE

## 2021-02-10 PROCEDURE — 1159F PR MEDICATION LIST DOCUMENTED IN MEDICAL RECORD: ICD-10-PCS | Mod: 95,,, | Performed by: INTERNAL MEDICINE

## 2021-02-11 ENCOUNTER — PATIENT MESSAGE (OUTPATIENT)
Dept: INTERNAL MEDICINE | Facility: CLINIC | Age: 71
End: 2021-02-11

## 2021-02-11 ENCOUNTER — HOSPITAL ENCOUNTER (OUTPATIENT)
Dept: RADIOLOGY | Facility: OTHER | Age: 71
Discharge: HOME | End: 2021-02-11
Attending: RADIOLOGY
Payer: MEDICARE

## 2021-02-11 ENCOUNTER — CLINICAL SUPPORT (OUTPATIENT)
Dept: INTERNAL MEDICINE | Facility: CLINIC | Age: 71
End: 2021-02-11
Payer: MEDICARE

## 2021-02-11 ENCOUNTER — OFFICE VISIT (OUTPATIENT)
Dept: RADIATION ONCOLOGY | Facility: CLINIC | Age: 71
End: 2021-02-11
Payer: MEDICARE

## 2021-02-11 VITALS
HEART RATE: 102 BPM | DIASTOLIC BLOOD PRESSURE: 56 MMHG | SYSTOLIC BLOOD PRESSURE: 114 MMHG | TEMPERATURE: 98 F | RESPIRATION RATE: 18 BRPM

## 2021-02-11 DIAGNOSIS — C34.11 PRIMARY MALIGNANT NEOPLASM OF RIGHT UPPER LOBE OF LUNG: ICD-10-CM

## 2021-02-11 DIAGNOSIS — Z85.118 PERSONAL HISTORY OF LUNG CANCER: Primary | ICD-10-CM

## 2021-02-11 PROCEDURE — 99999 PR PBB SHADOW E&M-EST. PATIENT-LVL IV: CPT | Mod: PBBFAC,,, | Performed by: RADIOLOGY

## 2021-02-11 PROCEDURE — 71250 CT THORAX DX C-: CPT | Mod: 26,,, | Performed by: RADIOLOGY

## 2021-02-11 PROCEDURE — 3288F PR FALLS RISK ASSESSMENT DOCUMENTED: ICD-10-PCS | Mod: CPTII,S$GLB,, | Performed by: RADIOLOGY

## 2021-02-11 PROCEDURE — 1101F PT FALLS ASSESS-DOCD LE1/YR: CPT | Mod: CPTII,S$GLB,, | Performed by: RADIOLOGY

## 2021-02-11 PROCEDURE — 99024 PR POST-OP FOLLOW-UP VISIT: ICD-10-PCS | Mod: S$GLB,,, | Performed by: RADIOLOGY

## 2021-02-11 PROCEDURE — 1126F AMNT PAIN NOTED NONE PRSNT: CPT | Mod: S$GLB,,, | Performed by: RADIOLOGY

## 2021-02-11 PROCEDURE — 71250 CT THORAX DX C-: CPT | Mod: TC

## 2021-02-11 PROCEDURE — 3288F FALL RISK ASSESSMENT DOCD: CPT | Mod: CPTII,S$GLB,, | Performed by: RADIOLOGY

## 2021-02-11 PROCEDURE — 99999 PR PBB SHADOW E&M-EST. PATIENT-LVL IV: ICD-10-PCS | Mod: PBBFAC,,, | Performed by: RADIOLOGY

## 2021-02-11 PROCEDURE — 1126F PR PAIN SEVERITY QUANTIFIED, NO PAIN PRESENT: ICD-10-PCS | Mod: S$GLB,,, | Performed by: RADIOLOGY

## 2021-02-11 PROCEDURE — 1101F PR PT FALLS ASSESS DOC 0-1 FALLS W/OUT INJ PAST YR: ICD-10-PCS | Mod: CPTII,S$GLB,, | Performed by: RADIOLOGY

## 2021-02-11 PROCEDURE — 99024 POSTOP FOLLOW-UP VISIT: CPT | Mod: S$GLB,,, | Performed by: RADIOLOGY

## 2021-02-11 PROCEDURE — 71250 CT CHEST WITHOUT CONTRAST: ICD-10-PCS | Mod: 26,,, | Performed by: RADIOLOGY

## 2021-02-15 ENCOUNTER — PATIENT MESSAGE (OUTPATIENT)
Dept: INTERNAL MEDICINE | Facility: CLINIC | Age: 71
End: 2021-02-15

## 2021-02-17 ENCOUNTER — PATIENT MESSAGE (OUTPATIENT)
Dept: INTERNAL MEDICINE | Facility: CLINIC | Age: 71
End: 2021-02-17

## 2021-02-22 ENCOUNTER — PATIENT MESSAGE (OUTPATIENT)
Dept: INTERNAL MEDICINE | Facility: CLINIC | Age: 71
End: 2021-02-22

## 2021-02-22 DIAGNOSIS — R60.9 EDEMA, UNSPECIFIED TYPE: ICD-10-CM

## 2021-02-22 RX ORDER — TORSEMIDE 20 MG/1
40 TABLET ORAL 2 TIMES DAILY
Qty: 360 TABLET | Refills: 3 | Status: ON HOLD | OUTPATIENT
Start: 2021-02-22 | End: 2021-05-07 | Stop reason: HOSPADM

## 2021-03-01 ENCOUNTER — HOSPITAL ENCOUNTER (OUTPATIENT)
Facility: OTHER | Age: 71
Discharge: HOME | DRG: 641 | End: 2021-03-04
Attending: EMERGENCY MEDICINE | Admitting: INTERNAL MEDICINE
Payer: MEDICARE

## 2021-03-01 DIAGNOSIS — K92.2 GASTROINTESTINAL HEMORRHAGE, UNSPECIFIED GASTROINTESTINAL HEMORRHAGE TYPE: Primary | ICD-10-CM

## 2021-03-01 DIAGNOSIS — E83.51 HYPOCALCEMIA: ICD-10-CM

## 2021-03-01 DIAGNOSIS — M79.89 LEFT ARM SWELLING: ICD-10-CM

## 2021-03-01 DIAGNOSIS — E87.1 HYPONATREMIA: ICD-10-CM

## 2021-03-01 DIAGNOSIS — E87.29 KETOACIDOSIS: ICD-10-CM

## 2021-03-01 LAB
ABO + RH BLD: NORMAL
ALBUMIN SERPL BCP-MCNC: 3 G/DL (ref 3.5–5.2)
ALP SERPL-CCNC: 154 U/L (ref 55–135)
ALT SERPL W/O P-5'-P-CCNC: 55 U/L (ref 10–44)
ANION GAP SERPL CALC-SCNC: 23 MMOL/L (ref 8–16)
AST SERPL-CCNC: 119 U/L (ref 10–40)
BASOPHILS # BLD AUTO: 0.03 K/UL (ref 0–0.2)
BASOPHILS NFR BLD: 0.4 % (ref 0–1.9)
BILIRUB SERPL-MCNC: 1 MG/DL (ref 0.1–1)
BLD GP AB SCN CELLS X3 SERPL QL: NORMAL
BUN SERPL-MCNC: 27 MG/DL (ref 8–23)
CALCIUM SERPL-MCNC: 6.8 MG/DL (ref 8.7–10.5)
CHLORIDE SERPL-SCNC: 88 MMOL/L (ref 95–110)
CO2 SERPL-SCNC: 13 MMOL/L (ref 23–29)
CREAT SERPL-MCNC: 1 MG/DL (ref 0.5–1.4)
CTP QC/QA: YES
D DIMER PPP IA.FEU-MCNC: 1.81 MG/L FEU
DIFFERENTIAL METHOD: ABNORMAL
EOSINOPHIL # BLD AUTO: 0 K/UL (ref 0–0.5)
EOSINOPHIL NFR BLD: 0.4 % (ref 0–8)
ERYTHROCYTE [DISTWIDTH] IN BLOOD BY AUTOMATED COUNT: 14.1 % (ref 11.5–14.5)
EST. GFR  (AFRICAN AMERICAN): >60 ML/MIN/1.73 M^2
EST. GFR  (NON AFRICAN AMERICAN): 57 ML/MIN/1.73 M^2
GLUCOSE SERPL-MCNC: 79 MG/DL (ref 70–110)
HCT VFR BLD AUTO: 33.3 % (ref 37–48.5)
HGB BLD-MCNC: 11 G/DL (ref 12–16)
IMM GRANULOCYTES # BLD AUTO: 0.03 K/UL (ref 0–0.04)
IMM GRANULOCYTES NFR BLD AUTO: 0.4 % (ref 0–0.5)
LYMPHOCYTES # BLD AUTO: 1.2 K/UL (ref 1–4.8)
LYMPHOCYTES NFR BLD: 16.9 % (ref 18–48)
MCH RBC QN AUTO: 30.5 PG (ref 27–31)
MCHC RBC AUTO-ENTMCNC: 33 G/DL (ref 32–36)
MCV RBC AUTO: 92 FL (ref 82–98)
MONOCYTES # BLD AUTO: 0.5 K/UL (ref 0.3–1)
MONOCYTES NFR BLD: 7.1 % (ref 4–15)
NEUTROPHILS # BLD AUTO: 5.2 K/UL (ref 1.8–7.7)
NEUTROPHILS NFR BLD: 74.8 % (ref 38–73)
NRBC BLD-RTO: 0 /100 WBC
PLATELET # BLD AUTO: 203 K/UL (ref 150–350)
PMV BLD AUTO: 9.2 FL (ref 9.2–12.9)
POTASSIUM SERPL-SCNC: 4.6 MMOL/L (ref 3.5–5.1)
PROT SERPL-MCNC: 6.2 G/DL (ref 6–8.4)
RBC # BLD AUTO: 3.61 M/UL (ref 4–5.4)
SARS-COV-2 RDRP RESP QL NAA+PROBE: NEGATIVE
SODIUM SERPL-SCNC: 124 MMOL/L (ref 136–145)
WBC # BLD AUTO: 6.94 K/UL (ref 3.9–12.7)

## 2021-03-01 PROCEDURE — 99285 EMERGENCY DEPT VISIT HI MDM: CPT | Mod: 25

## 2021-03-01 PROCEDURE — G0378 HOSPITAL OBSERVATION PER HR: HCPCS

## 2021-03-01 PROCEDURE — 80053 COMPREHEN METABOLIC PANEL: CPT

## 2021-03-01 PROCEDURE — 85025 COMPLETE CBC W/AUTO DIFF WBC: CPT

## 2021-03-01 PROCEDURE — 96365 THER/PROPH/DIAG IV INF INIT: CPT

## 2021-03-01 PROCEDURE — 25000003 PHARM REV CODE 250: Performed by: EMERGENCY MEDICINE

## 2021-03-01 PROCEDURE — 96366 THER/PROPH/DIAG IV INF ADDON: CPT

## 2021-03-01 PROCEDURE — 85379 FIBRIN DEGRADATION QUANT: CPT

## 2021-03-01 PROCEDURE — 86900 BLOOD TYPING SEROLOGIC ABO: CPT

## 2021-03-01 PROCEDURE — 63600175 PHARM REV CODE 636 W HCPCS: Performed by: EMERGENCY MEDICINE

## 2021-03-01 PROCEDURE — 12000002 HC ACUTE/MED SURGE SEMI-PRIVATE ROOM

## 2021-03-01 PROCEDURE — 96375 TX/PRO/DX INJ NEW DRUG ADDON: CPT

## 2021-03-01 PROCEDURE — C9113 INJ PANTOPRAZOLE SODIUM, VIA: HCPCS | Performed by: EMERGENCY MEDICINE

## 2021-03-01 PROCEDURE — U0002 COVID-19 LAB TEST NON-CDC: HCPCS | Performed by: EMERGENCY MEDICINE

## 2021-03-01 RX ORDER — CALCIUM GLUCONATE 20 MG/ML
1 INJECTION, SOLUTION INTRAVENOUS
Status: COMPLETED | OUTPATIENT
Start: 2021-03-01 | End: 2021-03-02

## 2021-03-01 RX ORDER — PANTOPRAZOLE SODIUM 40 MG/10ML
40 INJECTION, POWDER, LYOPHILIZED, FOR SOLUTION INTRAVENOUS
Status: COMPLETED | OUTPATIENT
Start: 2021-03-01 | End: 2021-03-01

## 2021-03-01 RX ORDER — CALCIUM GLUCONATE 20 MG/ML
1 INJECTION, SOLUTION INTRAVENOUS
Status: DISCONTINUED | OUTPATIENT
Start: 2021-03-01 | End: 2021-03-01

## 2021-03-01 RX ADMIN — SODIUM CHLORIDE 1000 ML: 0.9 INJECTION, SOLUTION INTRAVENOUS at 11:03

## 2021-03-01 RX ADMIN — PANTOPRAZOLE SODIUM 8 MG/HR: 40 INJECTION, POWDER, FOR SOLUTION INTRAVENOUS at 09:03

## 2021-03-01 RX ADMIN — CALCIUM GLUCONATE 1000 MG: 20 INJECTION, SOLUTION INTRAVENOUS at 11:03

## 2021-03-01 RX ADMIN — PANTOPRAZOLE SODIUM 40 MG: 40 INJECTION, POWDER, FOR SOLUTION INTRAVENOUS at 09:03

## 2021-03-02 PROBLEM — E83.51 HYPOCALCEMIA: Status: ACTIVE | Noted: 2021-03-02

## 2021-03-02 PROBLEM — E87.29 KETOACIDOSIS: Status: ACTIVE | Noted: 2021-03-02

## 2021-03-02 PROBLEM — F10.10 ALCOHOL ABUSE: Status: ACTIVE | Noted: 2021-03-02

## 2021-03-02 LAB
ALBUMIN SERPL BCP-MCNC: 3 G/DL (ref 3.5–5.2)
ALP SERPL-CCNC: 154 U/L (ref 55–135)
ALT SERPL W/O P-5'-P-CCNC: 52 U/L (ref 10–44)
ANION GAP SERPL CALC-SCNC: 14 MMOL/L (ref 8–16)
ANION GAP SERPL CALC-SCNC: 19 MMOL/L (ref 8–16)
AST SERPL-CCNC: 106 U/L (ref 10–40)
BASOPHILS # BLD AUTO: 0.02 K/UL (ref 0–0.2)
BASOPHILS # BLD AUTO: 0.03 K/UL (ref 0–0.2)
BASOPHILS NFR BLD: 0.3 % (ref 0–1.9)
BASOPHILS NFR BLD: 0.5 % (ref 0–1.9)
BILIRUB SERPL-MCNC: 1.1 MG/DL (ref 0.1–1)
BILIRUB UR QL STRIP: ABNORMAL
BNP SERPL-MCNC: 83 PG/ML (ref 0–99)
BUN SERPL-MCNC: 24 MG/DL (ref 8–23)
BUN SERPL-MCNC: 25 MG/DL (ref 8–23)
CA-I BLDV-SCNC: 0.89 MMOL/L (ref 1.06–1.42)
CALCIUM SERPL-MCNC: 6.8 MG/DL (ref 8.7–10.5)
CALCIUM SERPL-MCNC: 7 MG/DL (ref 8.7–10.5)
CHLORIDE SERPL-SCNC: 92 MMOL/L (ref 95–110)
CHLORIDE SERPL-SCNC: 93 MMOL/L (ref 95–110)
CLARITY UR: CLEAR
CO2 SERPL-SCNC: 15 MMOL/L (ref 23–29)
CO2 SERPL-SCNC: 20 MMOL/L (ref 23–29)
COLOR UR: YELLOW
CREAT SERPL-MCNC: 0.9 MG/DL (ref 0.5–1.4)
CREAT SERPL-MCNC: 1 MG/DL (ref 0.5–1.4)
DIFFERENTIAL METHOD: ABNORMAL
DIFFERENTIAL METHOD: ABNORMAL
EOSINOPHIL # BLD AUTO: 0 K/UL (ref 0–0.5)
EOSINOPHIL # BLD AUTO: 0.1 K/UL (ref 0–0.5)
EOSINOPHIL NFR BLD: 0.5 % (ref 0–8)
EOSINOPHIL NFR BLD: 1 % (ref 0–8)
ERYTHROCYTE [DISTWIDTH] IN BLOOD BY AUTOMATED COUNT: 14 % (ref 11.5–14.5)
ERYTHROCYTE [DISTWIDTH] IN BLOOD BY AUTOMATED COUNT: 14.1 % (ref 11.5–14.5)
EST. GFR  (AFRICAN AMERICAN): >60 ML/MIN/1.73 M^2
EST. GFR  (AFRICAN AMERICAN): >60 ML/MIN/1.73 M^2
EST. GFR  (NON AFRICAN AMERICAN): 57 ML/MIN/1.73 M^2
EST. GFR  (NON AFRICAN AMERICAN): >60 ML/MIN/1.73 M^2
GLUCOSE SERPL-MCNC: 110 MG/DL (ref 70–110)
GLUCOSE SERPL-MCNC: 66 MG/DL (ref 70–110)
GLUCOSE UR QL STRIP: NEGATIVE
HCT VFR BLD AUTO: 29.4 % (ref 37–48.5)
HCT VFR BLD AUTO: 30.9 % (ref 37–48.5)
HGB BLD-MCNC: 10.5 G/DL (ref 12–16)
HGB BLD-MCNC: 9.9 G/DL (ref 12–16)
HGB UR QL STRIP: NEGATIVE
IMM GRANULOCYTES # BLD AUTO: 0.04 K/UL (ref 0–0.04)
IMM GRANULOCYTES # BLD AUTO: 0.04 K/UL (ref 0–0.04)
IMM GRANULOCYTES NFR BLD AUTO: 0.6 % (ref 0–0.5)
IMM GRANULOCYTES NFR BLD AUTO: 0.7 % (ref 0–0.5)
KETONES UR QL STRIP: ABNORMAL
LACTATE SERPL-SCNC: 1.9 MMOL/L (ref 0.5–2.2)
LEUKOCYTE ESTERASE UR QL STRIP: NEGATIVE
LYMPHOCYTES # BLD AUTO: 1.2 K/UL (ref 1–4.8)
LYMPHOCYTES # BLD AUTO: 1.6 K/UL (ref 1–4.8)
LYMPHOCYTES NFR BLD: 20.4 % (ref 18–48)
LYMPHOCYTES NFR BLD: 23 % (ref 18–48)
MAGNESIUM SERPL-MCNC: 1.5 MG/DL (ref 1.6–2.6)
MAGNESIUM SERPL-MCNC: 2.2 MG/DL (ref 1.6–2.6)
MCH RBC QN AUTO: 30.9 PG (ref 27–31)
MCH RBC QN AUTO: 30.9 PG (ref 27–31)
MCHC RBC AUTO-ENTMCNC: 33.7 G/DL (ref 32–36)
MCHC RBC AUTO-ENTMCNC: 34 G/DL (ref 32–36)
MCV RBC AUTO: 91 FL (ref 82–98)
MCV RBC AUTO: 92 FL (ref 82–98)
MONOCYTES # BLD AUTO: 0.4 K/UL (ref 0.3–1)
MONOCYTES # BLD AUTO: 0.5 K/UL (ref 0.3–1)
MONOCYTES NFR BLD: 6.6 % (ref 4–15)
MONOCYTES NFR BLD: 6.7 % (ref 4–15)
NEUTROPHILS # BLD AUTO: 4.2 K/UL (ref 1.8–7.7)
NEUTROPHILS # BLD AUTO: 4.6 K/UL (ref 1.8–7.7)
NEUTROPHILS NFR BLD: 68.5 % (ref 38–73)
NEUTROPHILS NFR BLD: 71.2 % (ref 38–73)
NITRITE UR QL STRIP: NEGATIVE
NRBC BLD-RTO: 0 /100 WBC
NRBC BLD-RTO: 0 /100 WBC
OSMOLALITY SERPL: 287 MOSM/KG (ref 275–295)
OSMOLALITY UR: 232 MOSM/KG (ref 50–1200)
PH UR STRIP: 6 [PH] (ref 5–8)
PHOSPHATE SERPL-MCNC: 1.9 MG/DL (ref 2.7–4.5)
PHOSPHATE SERPL-MCNC: 3.2 MG/DL (ref 2.7–4.5)
PLATELET # BLD AUTO: 180 K/UL (ref 150–350)
PLATELET # BLD AUTO: 198 K/UL (ref 150–350)
PMV BLD AUTO: 9.1 FL (ref 9.2–12.9)
PMV BLD AUTO: 9.3 FL (ref 9.2–12.9)
POTASSIUM SERPL-SCNC: 4.3 MMOL/L (ref 3.5–5.1)
POTASSIUM SERPL-SCNC: 4.5 MMOL/L (ref 3.5–5.1)
PROT SERPL-MCNC: 6.2 G/DL (ref 6–8.4)
PROT UR QL STRIP: NEGATIVE
RBC # BLD AUTO: 3.2 M/UL (ref 4–5.4)
RBC # BLD AUTO: 3.4 M/UL (ref 4–5.4)
SODIUM SERPL-SCNC: 126 MMOL/L (ref 136–145)
SODIUM SERPL-SCNC: 127 MMOL/L (ref 136–145)
SODIUM UR-SCNC: <20 MMOL/L (ref 20–250)
SP GR UR STRIP: 1.01 (ref 1–1.03)
URN SPEC COLLECT METH UR: ABNORMAL
UROBILINOGEN UR STRIP-ACNC: 1 EU/DL
WBC # BLD AUTO: 5.83 K/UL (ref 3.9–12.7)
WBC # BLD AUTO: 6.77 K/UL (ref 3.9–12.7)

## 2021-03-02 PROCEDURE — 96367 TX/PROPH/DG ADDL SEQ IV INF: CPT

## 2021-03-02 PROCEDURE — 96376 TX/PRO/DX INJ SAME DRUG ADON: CPT

## 2021-03-02 PROCEDURE — 63600175 PHARM REV CODE 636 W HCPCS: Performed by: INTERNAL MEDICINE

## 2021-03-02 PROCEDURE — 85025 COMPLETE CBC W/AUTO DIFF WBC: CPT

## 2021-03-02 PROCEDURE — 25000003 PHARM REV CODE 250: Performed by: NURSE PRACTITIONER

## 2021-03-02 PROCEDURE — 80048 BASIC METABOLIC PNL TOTAL CA: CPT

## 2021-03-02 PROCEDURE — 25000003 PHARM REV CODE 250: Performed by: INTERNAL MEDICINE

## 2021-03-02 PROCEDURE — G0378 HOSPITAL OBSERVATION PER HR: HCPCS

## 2021-03-02 PROCEDURE — 25000242 PHARM REV CODE 250 ALT 637 W/ HCPCS: Performed by: NURSE PRACTITIONER

## 2021-03-02 PROCEDURE — 96366 THER/PROPH/DIAG IV INF ADDON: CPT

## 2021-03-02 PROCEDURE — 84100 ASSAY OF PHOSPHORUS: CPT

## 2021-03-02 PROCEDURE — 99220 PR INITIAL OBSERVATION CARE,LEVL III: CPT | Mod: ,,, | Performed by: NURSE PRACTITIONER

## 2021-03-02 PROCEDURE — 99220 PR INITIAL OBSERVATION CARE,LEVL III: CPT | Mod: ,,, | Performed by: INTERNAL MEDICINE

## 2021-03-02 PROCEDURE — 94761 N-INVAS EAR/PLS OXIMETRY MLT: CPT

## 2021-03-02 PROCEDURE — 99220 PR INITIAL OBSERVATION CARE,LEVL III: ICD-10-PCS | Mod: ,,, | Performed by: INTERNAL MEDICINE

## 2021-03-02 PROCEDURE — 83935 ASSAY OF URINE OSMOLALITY: CPT

## 2021-03-02 PROCEDURE — 96360 HYDRATION IV INFUSION INIT: CPT | Mod: 59

## 2021-03-02 PROCEDURE — 80053 COMPREHEN METABOLIC PANEL: CPT

## 2021-03-02 PROCEDURE — 96361 HYDRATE IV INFUSION ADD-ON: CPT

## 2021-03-02 PROCEDURE — 36415 COLL VENOUS BLD VENIPUNCTURE: CPT

## 2021-03-02 PROCEDURE — 94640 AIRWAY INHALATION TREATMENT: CPT

## 2021-03-02 PROCEDURE — 81003 URINALYSIS AUTO W/O SCOPE: CPT

## 2021-03-02 PROCEDURE — 99220 PR INITIAL OBSERVATION CARE,LEVL III: ICD-10-PCS | Mod: ,,, | Performed by: NURSE PRACTITIONER

## 2021-03-02 PROCEDURE — 83735 ASSAY OF MAGNESIUM: CPT | Mod: 91

## 2021-03-02 PROCEDURE — 99900035 HC TECH TIME PER 15 MIN (STAT)

## 2021-03-02 PROCEDURE — 63600175 PHARM REV CODE 636 W HCPCS: Performed by: NURSE PRACTITIONER

## 2021-03-02 PROCEDURE — 83930 ASSAY OF BLOOD OSMOLALITY: CPT

## 2021-03-02 PROCEDURE — 84100 ASSAY OF PHOSPHORUS: CPT | Mod: 91

## 2021-03-02 PROCEDURE — 84300 ASSAY OF URINE SODIUM: CPT

## 2021-03-02 PROCEDURE — 82330 ASSAY OF CALCIUM: CPT

## 2021-03-02 PROCEDURE — 21400001 HC TELEMETRY ROOM

## 2021-03-02 PROCEDURE — 83880 ASSAY OF NATRIURETIC PEPTIDE: CPT

## 2021-03-02 PROCEDURE — 83605 ASSAY OF LACTIC ACID: CPT

## 2021-03-02 PROCEDURE — 83735 ASSAY OF MAGNESIUM: CPT

## 2021-03-02 PROCEDURE — 27000221 HC OXYGEN, UP TO 24 HOURS

## 2021-03-02 RX ORDER — IPRATROPIUM BROMIDE AND ALBUTEROL SULFATE 2.5; .5 MG/3ML; MG/3ML
3 SOLUTION RESPIRATORY (INHALATION) EVERY 4 HOURS PRN
Status: DISCONTINUED | OUTPATIENT
Start: 2021-03-02 | End: 2021-03-04 | Stop reason: HOSPADM

## 2021-03-02 RX ORDER — CHOLECALCIFEROL (VITAMIN D3) 25 MCG
1000 TABLET ORAL DAILY
Status: DISCONTINUED | OUTPATIENT
Start: 2021-03-02 | End: 2021-03-04 | Stop reason: HOSPADM

## 2021-03-02 RX ORDER — UBIDECARENONE 75 MG
1000 CAPSULE ORAL DAILY
Status: DISCONTINUED | OUTPATIENT
Start: 2021-03-02 | End: 2021-03-04 | Stop reason: HOSPADM

## 2021-03-02 RX ORDER — DEXTROSE MONOHYDRATE AND SODIUM CHLORIDE 5; .9 G/100ML; G/100ML
INJECTION, SOLUTION INTRAVENOUS CONTINUOUS
Status: DISCONTINUED | OUTPATIENT
Start: 2021-03-02 | End: 2021-03-02

## 2021-03-02 RX ORDER — FLUTICASONE FUROATE AND VILANTEROL 100; 25 UG/1; UG/1
1 POWDER RESPIRATORY (INHALATION) DAILY
Refills: 3 | Status: DISCONTINUED | OUTPATIENT
Start: 2021-03-02 | End: 2021-03-04 | Stop reason: HOSPADM

## 2021-03-02 RX ORDER — PANTOPRAZOLE SODIUM 40 MG/1
40 TABLET, DELAYED RELEASE ORAL DAILY
Status: DISCONTINUED | OUTPATIENT
Start: 2021-03-02 | End: 2021-03-03

## 2021-03-02 RX ORDER — SODIUM CHLORIDE, SODIUM LACTATE, POTASSIUM CHLORIDE, CALCIUM CHLORIDE 600; 310; 30; 20 MG/100ML; MG/100ML; MG/100ML; MG/100ML
INJECTION, SOLUTION INTRAVENOUS CONTINUOUS
Status: ACTIVE | OUTPATIENT
Start: 2021-03-02 | End: 2021-03-02

## 2021-03-02 RX ORDER — SODIUM CHLORIDE 0.9 % (FLUSH) 0.9 %
10 SYRINGE (ML) INJECTION
Status: DISCONTINUED | OUTPATIENT
Start: 2021-03-02 | End: 2021-03-04 | Stop reason: HOSPADM

## 2021-03-02 RX ORDER — FAMOTIDINE 20 MG/1
20 TABLET, FILM COATED ORAL 2 TIMES DAILY
Status: DISCONTINUED | OUTPATIENT
Start: 2021-03-02 | End: 2021-03-02

## 2021-03-02 RX ORDER — MAGNESIUM SULFATE HEPTAHYDRATE 40 MG/ML
2 INJECTION, SOLUTION INTRAVENOUS
Status: COMPLETED | OUTPATIENT
Start: 2021-03-02 | End: 2021-03-02

## 2021-03-02 RX ORDER — ONDANSETRON 8 MG/1
8 TABLET, ORALLY DISINTEGRATING ORAL EVERY 8 HOURS PRN
Status: DISCONTINUED | OUTPATIENT
Start: 2021-03-02 | End: 2021-03-04 | Stop reason: HOSPADM

## 2021-03-02 RX ORDER — FOLIC ACID 1 MG/1
1 TABLET ORAL DAILY
Status: DISCONTINUED | OUTPATIENT
Start: 2021-03-02 | End: 2021-03-04 | Stop reason: HOSPADM

## 2021-03-02 RX ORDER — CALCIUM GLUCONATE 20 MG/ML
1000 INJECTION, SOLUTION INTRAVENOUS
Status: COMPLETED | OUTPATIENT
Start: 2021-03-02 | End: 2021-03-02

## 2021-03-02 RX ORDER — LANOLIN ALCOHOL/MO/W.PET/CERES
400 CREAM (GRAM) TOPICAL DAILY
Status: DISCONTINUED | OUTPATIENT
Start: 2021-03-02 | End: 2021-03-02

## 2021-03-02 RX ORDER — TRAMADOL HYDROCHLORIDE 50 MG/1
50 TABLET ORAL EVERY 6 HOURS PRN
Status: DISCONTINUED | OUTPATIENT
Start: 2021-03-02 | End: 2021-03-04 | Stop reason: HOSPADM

## 2021-03-02 RX ORDER — LANOLIN ALCOHOL/MO/W.PET/CERES
100 CREAM (GRAM) TOPICAL DAILY
Status: DISCONTINUED | OUTPATIENT
Start: 2021-03-02 | End: 2021-03-04 | Stop reason: HOSPADM

## 2021-03-02 RX ORDER — CALCIUM GLUCONATE 20 MG/ML
1 INJECTION, SOLUTION INTRAVENOUS ONCE
Status: COMPLETED | OUTPATIENT
Start: 2021-03-02 | End: 2021-03-02

## 2021-03-02 RX ADMIN — DEXTROSE AND SODIUM CHLORIDE 100 ML/HR: 5; .9 INJECTION, SOLUTION INTRAVENOUS at 02:03

## 2021-03-02 RX ADMIN — CALCIUM GLUCONATE 1000 MG: 20 INJECTION, SOLUTION INTRAVENOUS at 07:03

## 2021-03-02 RX ADMIN — PANTOPRAZOLE SODIUM 40 MG: 40 TABLET, DELAYED RELEASE ORAL at 09:03

## 2021-03-02 RX ADMIN — TRAMADOL HYDROCHLORIDE 50 MG: 50 TABLET, FILM COATED ORAL at 11:03

## 2021-03-02 RX ADMIN — FLUTICASONE FUROATE AND VILANTEROL TRIFENATATE 1 PUFF: 100; 25 POWDER RESPIRATORY (INHALATION) at 08:03

## 2021-03-02 RX ADMIN — SODIUM PHOSPHATE, MONOBASIC, MONOHYDRATE 30 MMOL: 276; 142 INJECTION, SOLUTION INTRAVENOUS at 09:03

## 2021-03-02 RX ADMIN — MAGNESIUM SULFATE 2 G: 2 INJECTION INTRAVENOUS at 07:03

## 2021-03-02 RX ADMIN — TIOTROPIUM BROMIDE INHALATION SPRAY 2 PUFF: 1.56 SPRAY, METERED RESPIRATORY (INHALATION) at 08:03

## 2021-03-02 RX ADMIN — Medication 1000 UNITS: at 09:03

## 2021-03-02 RX ADMIN — CYANOCOBALAMIN TAB 500 MCG 1000 MCG: 500 TAB at 09:03

## 2021-03-02 RX ADMIN — Medication 100 MG: at 09:03

## 2021-03-02 RX ADMIN — THERA TABS 1 TABLET: TAB at 09:03

## 2021-03-02 RX ADMIN — SODIUM CHLORIDE, SODIUM LACTATE, POTASSIUM CHLORIDE, AND CALCIUM CHLORIDE: .6; .31; .03; .02 INJECTION, SOLUTION INTRAVENOUS at 01:03

## 2021-03-02 RX ADMIN — CALCIUM GLUCONATE 1000 MG: 20 INJECTION, SOLUTION INTRAVENOUS at 01:03

## 2021-03-02 RX ADMIN — CALCIUM GLUCONATE 1000 MG: 20 INJECTION, SOLUTION INTRAVENOUS at 05:03

## 2021-03-02 RX ADMIN — CALCIUM GLUCONATE 1000 MG: 20 INJECTION, SOLUTION INTRAVENOUS at 02:03

## 2021-03-02 RX ADMIN — FOLIC ACID 1 MG: 1 TABLET ORAL at 09:03

## 2021-03-02 RX ADMIN — MAGNESIUM SULFATE 2 G: 2 INJECTION INTRAVENOUS at 10:03

## 2021-03-03 PROBLEM — J96.11 CHRONIC RESPIRATORY FAILURE WITH HYPOXIA: Chronic | Status: ACTIVE | Noted: 2018-07-15

## 2021-03-03 PROBLEM — N18.30 CHRONIC KIDNEY DISEASE, STAGE III (MODERATE): Chronic | Status: ACTIVE | Noted: 2020-01-09

## 2021-03-03 PROBLEM — I50.30 HEART FAILURE WITH PRESERVED EJECTION FRACTION, BORDERLINE, CLASS III: Chronic | Status: ACTIVE | Noted: 2018-09-17

## 2021-03-03 LAB
ALBUMIN SERPL BCP-MCNC: 2.6 G/DL (ref 3.5–5.2)
ALP SERPL-CCNC: 138 U/L (ref 55–135)
ALT SERPL W/O P-5'-P-CCNC: 48 U/L (ref 10–44)
ANION GAP SERPL CALC-SCNC: 11 MMOL/L (ref 8–16)
AST SERPL-CCNC: 90 U/L (ref 10–40)
BASOPHILS # BLD AUTO: 0.01 K/UL (ref 0–0.2)
BASOPHILS NFR BLD: 0.2 % (ref 0–1.9)
BILIRUB SERPL-MCNC: 0.8 MG/DL (ref 0.1–1)
BUN SERPL-MCNC: 21 MG/DL (ref 8–23)
CALCIUM SERPL-MCNC: 7.5 MG/DL (ref 8.7–10.5)
CHLORIDE SERPL-SCNC: 94 MMOL/L (ref 95–110)
CO2 SERPL-SCNC: 22 MMOL/L (ref 23–29)
CREAT SERPL-MCNC: 0.9 MG/DL (ref 0.5–1.4)
DIFFERENTIAL METHOD: ABNORMAL
EOSINOPHIL # BLD AUTO: 0.1 K/UL (ref 0–0.5)
EOSINOPHIL NFR BLD: 1.3 % (ref 0–8)
ERYTHROCYTE [DISTWIDTH] IN BLOOD BY AUTOMATED COUNT: 14.1 % (ref 11.5–14.5)
EST. GFR  (AFRICAN AMERICAN): >60 ML/MIN/1.73 M^2
EST. GFR  (NON AFRICAN AMERICAN): >60 ML/MIN/1.73 M^2
GLUCOSE SERPL-MCNC: 101 MG/DL (ref 70–110)
HCT VFR BLD AUTO: 26.7 % (ref 37–48.5)
HGB BLD-MCNC: 8.6 G/DL (ref 12–16)
HGB BLD-MCNC: 9 G/DL (ref 12–16)
IMM GRANULOCYTES # BLD AUTO: 0.02 K/UL (ref 0–0.04)
IMM GRANULOCYTES NFR BLD AUTO: 0.4 % (ref 0–0.5)
INR PPP: 0.9 (ref 0.8–1.2)
LYMPHOCYTES # BLD AUTO: 1.1 K/UL (ref 1–4.8)
LYMPHOCYTES NFR BLD: 24 % (ref 18–48)
MAGNESIUM SERPL-MCNC: 2 MG/DL (ref 1.6–2.6)
MCH RBC QN AUTO: 30.8 PG (ref 27–31)
MCHC RBC AUTO-ENTMCNC: 33.7 G/DL (ref 32–36)
MCV RBC AUTO: 91 FL (ref 82–98)
MONOCYTES # BLD AUTO: 0.4 K/UL (ref 0.3–1)
MONOCYTES NFR BLD: 8.8 % (ref 4–15)
NEUTROPHILS # BLD AUTO: 3 K/UL (ref 1.8–7.7)
NEUTROPHILS NFR BLD: 65.3 % (ref 38–73)
NRBC BLD-RTO: 0 /100 WBC
PHOSPHATE SERPL-MCNC: 1.8 MG/DL (ref 2.7–4.5)
PLATELET # BLD AUTO: 151 K/UL (ref 150–350)
PMV BLD AUTO: 9.8 FL (ref 9.2–12.9)
POTASSIUM SERPL-SCNC: 3.8 MMOL/L (ref 3.5–5.1)
PROT SERPL-MCNC: 5.3 G/DL (ref 6–8.4)
PROTHROMBIN TIME: 10.3 SEC (ref 9–12.5)
RBC # BLD AUTO: 2.92 M/UL (ref 4–5.4)
SODIUM SERPL-SCNC: 127 MMOL/L (ref 136–145)
WBC # BLD AUTO: 4.54 K/UL (ref 3.9–12.7)

## 2021-03-03 PROCEDURE — 80053 COMPREHEN METABOLIC PANEL: CPT

## 2021-03-03 PROCEDURE — 84100 ASSAY OF PHOSPHORUS: CPT

## 2021-03-03 PROCEDURE — 25000003 PHARM REV CODE 250: Performed by: NURSE PRACTITIONER

## 2021-03-03 PROCEDURE — 85018 HEMOGLOBIN: CPT | Mod: 91 | Performed by: INTERNAL MEDICINE

## 2021-03-03 PROCEDURE — 36415 COLL VENOUS BLD VENIPUNCTURE: CPT

## 2021-03-03 PROCEDURE — 94761 N-INVAS EAR/PLS OXIMETRY MLT: CPT

## 2021-03-03 PROCEDURE — 99233 PR SUBSEQUENT HOSPITAL CARE,LEVL III: ICD-10-PCS | Mod: ,,, | Performed by: INTERNAL MEDICINE

## 2021-03-03 PROCEDURE — 85025 COMPLETE CBC W/AUTO DIFF WBC: CPT

## 2021-03-03 PROCEDURE — 27000221 HC OXYGEN, UP TO 24 HOURS

## 2021-03-03 PROCEDURE — 99900035 HC TECH TIME PER 15 MIN (STAT)

## 2021-03-03 PROCEDURE — 63600175 PHARM REV CODE 636 W HCPCS: Performed by: INTERNAL MEDICINE

## 2021-03-03 PROCEDURE — 99233 SBSQ HOSP IP/OBS HIGH 50: CPT | Mod: ,,, | Performed by: INTERNAL MEDICINE

## 2021-03-03 PROCEDURE — 94640 AIRWAY INHALATION TREATMENT: CPT

## 2021-03-03 PROCEDURE — 36415 COLL VENOUS BLD VENIPUNCTURE: CPT | Performed by: INTERNAL MEDICINE

## 2021-03-03 PROCEDURE — G0378 HOSPITAL OBSERVATION PER HR: HCPCS

## 2021-03-03 PROCEDURE — 85610 PROTHROMBIN TIME: CPT | Performed by: INTERNAL MEDICINE

## 2021-03-03 PROCEDURE — C9113 INJ PANTOPRAZOLE SODIUM, VIA: HCPCS | Performed by: INTERNAL MEDICINE

## 2021-03-03 PROCEDURE — 25000003 PHARM REV CODE 250: Performed by: INTERNAL MEDICINE

## 2021-03-03 PROCEDURE — 83735 ASSAY OF MAGNESIUM: CPT

## 2021-03-03 PROCEDURE — 21400001 HC TELEMETRY ROOM

## 2021-03-03 RX ORDER — PANTOPRAZOLE SODIUM 40 MG/10ML
40 INJECTION, POWDER, LYOPHILIZED, FOR SOLUTION INTRAVENOUS 2 TIMES DAILY
Status: DISCONTINUED | OUTPATIENT
Start: 2021-03-03 | End: 2021-03-04

## 2021-03-03 RX ORDER — CALCIUM GLUCONATE 20 MG/ML
1000 INJECTION, SOLUTION INTRAVENOUS ONCE
Status: COMPLETED | OUTPATIENT
Start: 2021-03-03 | End: 2021-03-03

## 2021-03-03 RX ORDER — PANTOPRAZOLE SODIUM 40 MG/10ML
80 INJECTION, POWDER, LYOPHILIZED, FOR SOLUTION INTRAVENOUS ONCE
Status: COMPLETED | OUTPATIENT
Start: 2021-03-03 | End: 2021-03-03

## 2021-03-03 RX ADMIN — TRAMADOL HYDROCHLORIDE 50 MG: 50 TABLET, FILM COATED ORAL at 08:03

## 2021-03-03 RX ADMIN — CALCIUM GLUCONATE 1000 MG: 20 INJECTION, SOLUTION INTRAVENOUS at 08:03

## 2021-03-03 RX ADMIN — TIOTROPIUM BROMIDE INHALATION SPRAY 2 PUFF: 1.56 SPRAY, METERED RESPIRATORY (INHALATION) at 07:03

## 2021-03-03 RX ADMIN — THERA TABS 1 TABLET: TAB at 08:03

## 2021-03-03 RX ADMIN — PANTOPRAZOLE SODIUM 40 MG: 40 INJECTION, POWDER, FOR SOLUTION INTRAVENOUS at 05:03

## 2021-03-03 RX ADMIN — CYANOCOBALAMIN TAB 500 MCG 1000 MCG: 500 TAB at 10:03

## 2021-03-03 RX ADMIN — Medication 100 MG: at 08:03

## 2021-03-03 RX ADMIN — Medication 1000 UNITS: at 08:03

## 2021-03-03 RX ADMIN — SODIUM PHOSPHATE, MONOBASIC, MONOHYDRATE 30 MMOL: 276; 142 INJECTION, SOLUTION INTRAVENOUS at 08:03

## 2021-03-03 RX ADMIN — FLUTICASONE FUROATE AND VILANTEROL TRIFENATATE 1 PUFF: 100; 25 POWDER RESPIRATORY (INHALATION) at 07:03

## 2021-03-03 RX ADMIN — FOLIC ACID 1 MG: 1 TABLET ORAL at 08:03

## 2021-03-03 RX ADMIN — PANTOPRAZOLE SODIUM 80 MG: 40 INJECTION, POWDER, FOR SOLUTION INTRAVENOUS at 08:03

## 2021-03-04 ENCOUNTER — ANESTHESIA (OUTPATIENT)
Dept: ENDOSCOPY | Facility: OTHER | Age: 71
DRG: 641 | End: 2021-03-04
Payer: MEDICARE

## 2021-03-04 ENCOUNTER — ANESTHESIA EVENT (OUTPATIENT)
Dept: ENDOSCOPY | Facility: OTHER | Age: 71
DRG: 641 | End: 2021-03-04
Payer: MEDICARE

## 2021-03-04 ENCOUNTER — PATIENT MESSAGE (OUTPATIENT)
Dept: INTERNAL MEDICINE | Facility: CLINIC | Age: 71
End: 2021-03-04

## 2021-03-04 VITALS
WEIGHT: 209.88 LBS | RESPIRATION RATE: 18 BRPM | HEIGHT: 66 IN | OXYGEN SATURATION: 99 % | TEMPERATURE: 98 F | BODY MASS INDEX: 33.73 KG/M2 | SYSTOLIC BLOOD PRESSURE: 114 MMHG | HEART RATE: 72 BPM | DIASTOLIC BLOOD PRESSURE: 66 MMHG

## 2021-03-04 DIAGNOSIS — G89.4 CHRONIC PAIN DISORDER: ICD-10-CM

## 2021-03-04 LAB
ALBUMIN SERPL BCP-MCNC: 2.9 G/DL (ref 3.5–5.2)
ALP SERPL-CCNC: 187 U/L (ref 55–135)
ALT SERPL W/O P-5'-P-CCNC: 64 U/L (ref 10–44)
ANION GAP SERPL CALC-SCNC: 13 MMOL/L (ref 8–16)
AST SERPL-CCNC: 124 U/L (ref 10–40)
BASOPHILS # BLD AUTO: 0.01 K/UL (ref 0–0.2)
BASOPHILS NFR BLD: 0.2 % (ref 0–1.9)
BILIRUB SERPL-MCNC: 0.8 MG/DL (ref 0.1–1)
BUN SERPL-MCNC: 20 MG/DL (ref 8–23)
CA-I BLDV-SCNC: 1.1 MMOL/L (ref 1.06–1.42)
CALCIUM SERPL-MCNC: 8.9 MG/DL (ref 8.7–10.5)
CHLORIDE SERPL-SCNC: 94 MMOL/L (ref 95–110)
CO2 SERPL-SCNC: 20 MMOL/L (ref 23–29)
CREAT SERPL-MCNC: 1.1 MG/DL (ref 0.5–1.4)
DIFFERENTIAL METHOD: ABNORMAL
EOSINOPHIL # BLD AUTO: 0.1 K/UL (ref 0–0.5)
EOSINOPHIL NFR BLD: 2.2 % (ref 0–8)
ERYTHROCYTE [DISTWIDTH] IN BLOOD BY AUTOMATED COUNT: 14.1 % (ref 11.5–14.5)
EST. GFR  (AFRICAN AMERICAN): 58 ML/MIN/1.73 M^2
EST. GFR  (NON AFRICAN AMERICAN): 51 ML/MIN/1.73 M^2
GLUCOSE SERPL-MCNC: 109 MG/DL (ref 70–110)
HCT VFR BLD AUTO: 29.8 % (ref 37–48.5)
HGB BLD-MCNC: 9.3 G/DL (ref 12–16)
HGB BLD-MCNC: 9.8 G/DL (ref 12–16)
IMM GRANULOCYTES # BLD AUTO: 0.01 K/UL (ref 0–0.04)
IMM GRANULOCYTES NFR BLD AUTO: 0.2 % (ref 0–0.5)
LYMPHOCYTES # BLD AUTO: 1.2 K/UL (ref 1–4.8)
LYMPHOCYTES NFR BLD: 30.4 % (ref 18–48)
MAGNESIUM SERPL-MCNC: 1.7 MG/DL (ref 1.6–2.6)
MCH RBC QN AUTO: 30.5 PG (ref 27–31)
MCHC RBC AUTO-ENTMCNC: 32.9 G/DL (ref 32–36)
MCV RBC AUTO: 93 FL (ref 82–98)
MONOCYTES # BLD AUTO: 0.3 K/UL (ref 0.3–1)
MONOCYTES NFR BLD: 8.1 % (ref 4–15)
NEUTROPHILS # BLD AUTO: 2.4 K/UL (ref 1.8–7.7)
NEUTROPHILS NFR BLD: 58.9 % (ref 38–73)
NRBC BLD-RTO: 0 /100 WBC
PHOSPHATE SERPL-MCNC: 3 MG/DL (ref 2.7–4.5)
PLATELET # BLD AUTO: 154 K/UL (ref 150–350)
PMV BLD AUTO: 10.3 FL (ref 9.2–12.9)
POTASSIUM SERPL-SCNC: 4.1 MMOL/L (ref 3.5–5.1)
PROT SERPL-MCNC: 6 G/DL (ref 6–8.4)
RBC # BLD AUTO: 3.21 M/UL (ref 4–5.4)
SODIUM SERPL-SCNC: 127 MMOL/L (ref 136–145)
WBC # BLD AUTO: 4.08 K/UL (ref 3.9–12.7)

## 2021-03-04 PROCEDURE — 25000003 PHARM REV CODE 250: Performed by: INTERNAL MEDICINE

## 2021-03-04 PROCEDURE — 27000221 HC OXYGEN, UP TO 24 HOURS

## 2021-03-04 PROCEDURE — 94761 N-INVAS EAR/PLS OXIMETRY MLT: CPT

## 2021-03-04 PROCEDURE — 85025 COMPLETE CBC W/AUTO DIFF WBC: CPT | Performed by: NURSE PRACTITIONER

## 2021-03-04 PROCEDURE — 94640 AIRWAY INHALATION TREATMENT: CPT

## 2021-03-04 PROCEDURE — 84100 ASSAY OF PHOSPHORUS: CPT | Performed by: NURSE PRACTITIONER

## 2021-03-04 PROCEDURE — 83735 ASSAY OF MAGNESIUM: CPT | Performed by: NURSE PRACTITIONER

## 2021-03-04 PROCEDURE — 80053 COMPREHEN METABOLIC PANEL: CPT | Performed by: NURSE PRACTITIONER

## 2021-03-04 PROCEDURE — 82330 ASSAY OF CALCIUM: CPT | Performed by: INTERNAL MEDICINE

## 2021-03-04 PROCEDURE — 63600175 PHARM REV CODE 636 W HCPCS: Performed by: NURSE ANESTHETIST, CERTIFIED REGISTERED

## 2021-03-04 PROCEDURE — 43235 EGD DIAGNOSTIC BRUSH WASH: CPT | Performed by: INTERNAL MEDICINE

## 2021-03-04 PROCEDURE — G0378 HOSPITAL OBSERVATION PER HR: HCPCS

## 2021-03-04 PROCEDURE — 37000008 HC ANESTHESIA 1ST 15 MINUTES: Performed by: INTERNAL MEDICINE

## 2021-03-04 PROCEDURE — 25000003 PHARM REV CODE 250: Performed by: NURSE PRACTITIONER

## 2021-03-04 PROCEDURE — 25000003 PHARM REV CODE 250: Performed by: NURSE ANESTHETIST, CERTIFIED REGISTERED

## 2021-03-04 PROCEDURE — 36415 COLL VENOUS BLD VENIPUNCTURE: CPT | Performed by: NURSE PRACTITIONER

## 2021-03-04 PROCEDURE — 99900035 HC TECH TIME PER 15 MIN (STAT)

## 2021-03-04 PROCEDURE — 99239 PR HOSPITAL DISCHARGE DAY,>30 MIN: ICD-10-PCS | Mod: ,,, | Performed by: INTERNAL MEDICINE

## 2021-03-04 PROCEDURE — 37000009 HC ANESTHESIA EA ADD 15 MINS: Performed by: INTERNAL MEDICINE

## 2021-03-04 PROCEDURE — 99239 HOSP IP/OBS DSCHRG MGMT >30: CPT | Mod: ,,, | Performed by: INTERNAL MEDICINE

## 2021-03-04 RX ORDER — SODIUM CHLORIDE 0.9 % (FLUSH) 0.9 %
3 SYRINGE (ML) INJECTION
Status: DISCONTINUED | OUTPATIENT
Start: 2021-03-04 | End: 2021-03-04 | Stop reason: HOSPADM

## 2021-03-04 RX ORDER — METOCLOPRAMIDE 5 MG/1
5 TABLET ORAL
Qty: 120 TABLET | Refills: 0 | Status: SHIPPED | OUTPATIENT
Start: 2021-03-04 | End: 2021-03-23

## 2021-03-04 RX ORDER — ONDANSETRON 2 MG/ML
4 INJECTION INTRAMUSCULAR; INTRAVENOUS DAILY PRN
Status: DISCONTINUED | OUTPATIENT
Start: 2021-03-04 | End: 2021-03-04 | Stop reason: HOSPADM

## 2021-03-04 RX ORDER — LIDOCAINE HYDROCHLORIDE 10 MG/ML
INJECTION, SOLUTION INTRAVENOUS
Status: DISCONTINUED | OUTPATIENT
Start: 2021-03-04 | End: 2021-03-04

## 2021-03-04 RX ORDER — ONDANSETRON 2 MG/ML
INJECTION INTRAMUSCULAR; INTRAVENOUS
Status: DISCONTINUED | OUTPATIENT
Start: 2021-03-04 | End: 2021-03-04

## 2021-03-04 RX ORDER — PHENYLEPHRINE HYDROCHLORIDE 10 MG/ML
INJECTION INTRAVENOUS
Status: DISCONTINUED | OUTPATIENT
Start: 2021-03-04 | End: 2021-03-04

## 2021-03-04 RX ORDER — PROPOFOL 10 MG/ML
VIAL (ML) INTRAVENOUS CONTINUOUS PRN
Status: DISCONTINUED | OUTPATIENT
Start: 2021-03-04 | End: 2021-03-04

## 2021-03-04 RX ORDER — HYDROMORPHONE HYDROCHLORIDE 2 MG/ML
0.4 INJECTION, SOLUTION INTRAMUSCULAR; INTRAVENOUS; SUBCUTANEOUS EVERY 5 MIN PRN
Status: DISCONTINUED | OUTPATIENT
Start: 2021-03-04 | End: 2021-03-04 | Stop reason: HOSPADM

## 2021-03-04 RX ORDER — POLYETHYLENE GLYCOL 3350 17 G/17G
17 POWDER, FOR SOLUTION ORAL 2 TIMES DAILY PRN
Refills: 0 | Status: ON HOLD | COMMUNITY
Start: 2021-03-04 | End: 2021-09-07 | Stop reason: HOSPADM

## 2021-03-04 RX ORDER — PANTOPRAZOLE SODIUM 40 MG/1
40 TABLET, DELAYED RELEASE ORAL DAILY
Status: DISCONTINUED | OUTPATIENT
Start: 2021-03-04 | End: 2021-03-04 | Stop reason: HOSPADM

## 2021-03-04 RX ORDER — DIPHENHYDRAMINE HYDROCHLORIDE 50 MG/ML
12.5 INJECTION INTRAMUSCULAR; INTRAVENOUS EVERY 30 MIN PRN
Status: DISCONTINUED | OUTPATIENT
Start: 2021-03-04 | End: 2021-03-04 | Stop reason: HOSPADM

## 2021-03-04 RX ORDER — OXYCODONE HYDROCHLORIDE 5 MG/1
5 TABLET ORAL
Status: DISCONTINUED | OUTPATIENT
Start: 2021-03-04 | End: 2021-03-04 | Stop reason: HOSPADM

## 2021-03-04 RX ORDER — METOCLOPRAMIDE 5 MG/1
5 TABLET ORAL
Status: DISCONTINUED | OUTPATIENT
Start: 2021-03-04 | End: 2021-03-04 | Stop reason: HOSPADM

## 2021-03-04 RX ORDER — HYDROCODONE BITARTRATE AND ACETAMINOPHEN 10; 325 MG/1; MG/1
1 TABLET ORAL EVERY 12 HOURS
Qty: 60 TABLET | Refills: 0 | Status: SHIPPED | OUTPATIENT
Start: 2021-03-04 | End: 2021-04-05 | Stop reason: SDUPTHER

## 2021-03-04 RX ORDER — PANTOPRAZOLE SODIUM 40 MG/1
40 TABLET, DELAYED RELEASE ORAL DAILY
Qty: 30 TABLET | Refills: 0 | Status: SHIPPED | OUTPATIENT
Start: 2021-03-04 | End: 2021-04-05 | Stop reason: SDUPTHER

## 2021-03-04 RX ORDER — PROPOFOL 10 MG/ML
VIAL (ML) INTRAVENOUS
Status: DISCONTINUED | OUTPATIENT
Start: 2021-03-04 | End: 2021-03-04

## 2021-03-04 RX ADMIN — THERA TABS 1 TABLET: TAB at 09:03

## 2021-03-04 RX ADMIN — TIOTROPIUM BROMIDE INHALATION SPRAY 2 PUFF: 1.56 SPRAY, METERED RESPIRATORY (INHALATION) at 08:03

## 2021-03-04 RX ADMIN — FLUTICASONE FUROATE AND VILANTEROL TRIFENATATE 1 PUFF: 100; 25 POWDER RESPIRATORY (INHALATION) at 08:03

## 2021-03-04 RX ADMIN — ONDANSETRON HYDROCHLORIDE 4 MG: 2 INJECTION INTRAMUSCULAR; INTRAVENOUS at 07:03

## 2021-03-04 RX ADMIN — FOLIC ACID 1 MG: 1 TABLET ORAL at 09:03

## 2021-03-04 RX ADMIN — CYANOCOBALAMIN TAB 500 MCG 1000 MCG: 500 TAB at 09:03

## 2021-03-04 RX ADMIN — METOCLOPRAMIDE 5 MG: 5 TABLET ORAL at 04:03

## 2021-03-04 RX ADMIN — PROPOFOL 30 MG: 10 INJECTION, EMULSION INTRAVENOUS at 07:03

## 2021-03-04 RX ADMIN — Medication 1000 UNITS: at 09:03

## 2021-03-04 RX ADMIN — METOCLOPRAMIDE 5 MG: 5 TABLET ORAL at 12:03

## 2021-03-04 RX ADMIN — LIDOCAINE HYDROCHLORIDE 50 MG: 10 INJECTION, SOLUTION INTRAVENOUS at 07:03

## 2021-03-04 RX ADMIN — PANTOPRAZOLE SODIUM 40 MG: 40 TABLET, DELAYED RELEASE ORAL at 09:03

## 2021-03-04 RX ADMIN — PHENYLEPHRINE HYDROCHLORIDE 100 MCG: 10 INJECTION INTRAVENOUS at 07:03

## 2021-03-04 RX ADMIN — Medication 100 MG: at 09:03

## 2021-03-04 RX ADMIN — PROPOFOL 75 MCG/KG/MIN: 10 INJECTION, EMULSION INTRAVENOUS at 07:03

## 2021-03-09 ENCOUNTER — TELEPHONE (OUTPATIENT)
Dept: HEMATOLOGY/ONCOLOGY | Facility: CLINIC | Age: 71
End: 2021-03-09

## 2021-03-09 ENCOUNTER — DOCUMENTATION ONLY (OUTPATIENT)
Dept: HEMATOLOGY/ONCOLOGY | Facility: CLINIC | Age: 71
End: 2021-03-09

## 2021-03-09 ENCOUNTER — PATIENT MESSAGE (OUTPATIENT)
Dept: HEMATOLOGY/ONCOLOGY | Facility: CLINIC | Age: 71
End: 2021-03-09

## 2021-03-09 DIAGNOSIS — D53.9 NUTRITIONAL ANEMIA: Primary | ICD-10-CM

## 2021-03-11 ENCOUNTER — LAB VISIT (OUTPATIENT)
Dept: LAB | Facility: OTHER | Age: 71
End: 2021-03-11
Payer: MEDICARE

## 2021-03-11 DIAGNOSIS — D53.9 NUTRITIONAL ANEMIA: ICD-10-CM

## 2021-03-11 LAB
BASOPHILS # BLD AUTO: 0.04 K/UL (ref 0–0.2)
BASOPHILS NFR BLD: 0.3 % (ref 0–1.9)
DIFFERENTIAL METHOD: ABNORMAL
EOSINOPHIL # BLD AUTO: 0 K/UL (ref 0–0.5)
EOSINOPHIL NFR BLD: 0.1 % (ref 0–8)
ERYTHROCYTE [DISTWIDTH] IN BLOOD BY AUTOMATED COUNT: 15.1 % (ref 11.5–14.5)
FERRITIN SERPL-MCNC: 354 NG/ML (ref 20–300)
FOLATE SERPL-MCNC: 9.3 NG/ML (ref 4–24)
HCT VFR BLD AUTO: 30.4 % (ref 37–48.5)
HGB BLD-MCNC: 9.9 G/DL (ref 12–16)
IMM GRANULOCYTES # BLD AUTO: 0.05 K/UL (ref 0–0.04)
IMM GRANULOCYTES NFR BLD AUTO: 0.4 % (ref 0–0.5)
IRON SERPL-MCNC: 27 UG/DL (ref 30–160)
LYMPHOCYTES # BLD AUTO: 1.8 K/UL (ref 1–4.8)
LYMPHOCYTES NFR BLD: 14.3 % (ref 18–48)
MCH RBC QN AUTO: 30.1 PG (ref 27–31)
MCHC RBC AUTO-ENTMCNC: 32.6 G/DL (ref 32–36)
MCV RBC AUTO: 92 FL (ref 82–98)
MONOCYTES # BLD AUTO: 1.8 K/UL (ref 0.3–1)
MONOCYTES NFR BLD: 13.8 % (ref 4–15)
NEUTROPHILS # BLD AUTO: 9.1 K/UL (ref 1.8–7.7)
NEUTROPHILS NFR BLD: 71.1 % (ref 38–73)
NRBC BLD-RTO: 0 /100 WBC
PLATELET # BLD AUTO: 302 K/UL (ref 150–350)
PMV BLD AUTO: 10 FL (ref 9.2–12.9)
RBC # BLD AUTO: 3.29 M/UL (ref 4–5.4)
SATURATED IRON: 17 % (ref 20–50)
TOTAL IRON BINDING CAPACITY: 158 UG/DL (ref 250–450)
TRANSFERRIN SERPL-MCNC: 107 MG/DL (ref 200–375)
VIT B12 SERPL-MCNC: 1374 PG/ML (ref 210–950)
WBC # BLD AUTO: 12.77 K/UL (ref 3.9–12.7)

## 2021-03-11 PROCEDURE — 83540 ASSAY OF IRON: CPT | Performed by: INTERNAL MEDICINE

## 2021-03-11 PROCEDURE — 82728 ASSAY OF FERRITIN: CPT | Performed by: INTERNAL MEDICINE

## 2021-03-11 PROCEDURE — 82607 VITAMIN B-12: CPT | Performed by: INTERNAL MEDICINE

## 2021-03-11 PROCEDURE — 85025 COMPLETE CBC W/AUTO DIFF WBC: CPT | Performed by: INTERNAL MEDICINE

## 2021-03-11 PROCEDURE — 36415 COLL VENOUS BLD VENIPUNCTURE: CPT | Performed by: INTERNAL MEDICINE

## 2021-03-11 PROCEDURE — 82746 ASSAY OF FOLIC ACID SERUM: CPT | Performed by: INTERNAL MEDICINE

## 2021-03-12 ENCOUNTER — TELEPHONE (OUTPATIENT)
Dept: HEMATOLOGY/ONCOLOGY | Facility: CLINIC | Age: 71
End: 2021-03-12

## 2021-03-12 ENCOUNTER — OFFICE VISIT (OUTPATIENT)
Dept: HEMATOLOGY/ONCOLOGY | Facility: CLINIC | Age: 71
End: 2021-03-12
Payer: MEDICARE

## 2021-03-12 DIAGNOSIS — D53.9 NUTRITIONAL ANEMIA: ICD-10-CM

## 2021-03-12 DIAGNOSIS — D63.8 ANEMIA OF CHRONIC DISEASE: Primary | ICD-10-CM

## 2021-03-12 DIAGNOSIS — K92.1 MELENA: ICD-10-CM

## 2021-03-12 DIAGNOSIS — R25.1 TREMOR: ICD-10-CM

## 2021-03-12 DIAGNOSIS — C34.11 PRIMARY MALIGNANT NEOPLASM OF RIGHT UPPER LOBE OF LUNG: ICD-10-CM

## 2021-03-12 DIAGNOSIS — J96.11 CHRONIC RESPIRATORY FAILURE WITH HYPOXIA: ICD-10-CM

## 2021-03-12 PROCEDURE — 99214 OFFICE O/P EST MOD 30 MIN: CPT | Mod: 95,,, | Performed by: INTERNAL MEDICINE

## 2021-03-12 PROCEDURE — 1159F MED LIST DOCD IN RCRD: CPT | Mod: 95,,, | Performed by: INTERNAL MEDICINE

## 2021-03-12 PROCEDURE — 99214 PR OFFICE/OUTPT VISIT, EST, LEVL IV, 30-39 MIN: ICD-10-PCS | Mod: 95,,, | Performed by: INTERNAL MEDICINE

## 2021-03-12 PROCEDURE — 1159F PR MEDICATION LIST DOCUMENTED IN MEDICAL RECORD: ICD-10-PCS | Mod: 95,,, | Performed by: INTERNAL MEDICINE

## 2021-03-16 ENCOUNTER — PATIENT MESSAGE (OUTPATIENT)
Dept: INTERNAL MEDICINE | Facility: CLINIC | Age: 71
End: 2021-03-16

## 2021-03-22 ENCOUNTER — HOSPITAL ENCOUNTER (INPATIENT)
Facility: OTHER | Age: 71
LOS: 5 days | Discharge: HOME | DRG: 445 | End: 2021-03-27
Attending: EMERGENCY MEDICINE | Admitting: INTERNAL MEDICINE
Payer: MEDICARE

## 2021-03-22 DIAGNOSIS — K80.50 CHOLEDOCHOLITHIASIS: Primary | ICD-10-CM

## 2021-03-22 DIAGNOSIS — R79.89 ELEVATED LFTS: ICD-10-CM

## 2021-03-22 DIAGNOSIS — D64.9 ANEMIA, UNSPECIFIED TYPE: ICD-10-CM

## 2021-03-22 DIAGNOSIS — F10.10 ALCOHOL ABUSE: ICD-10-CM

## 2021-03-22 DIAGNOSIS — R60.0 EDEMA OF LEFT UPPER EXTREMITY: ICD-10-CM

## 2021-03-22 DIAGNOSIS — R00.0 TACHYCARDIA: ICD-10-CM

## 2021-03-22 DIAGNOSIS — E16.2 HYPOGLYCEMIA: ICD-10-CM

## 2021-03-22 DIAGNOSIS — R07.9 CHEST PAIN: ICD-10-CM

## 2021-03-22 LAB
ABO + RH BLD: NORMAL
ALBUMIN SERPL BCP-MCNC: 2.5 G/DL (ref 3.5–5.2)
ALLENS TEST: ABNORMAL
ALP SERPL-CCNC: 210 U/L (ref 55–135)
ALT SERPL W/O P-5'-P-CCNC: 101 U/L (ref 10–44)
ANION GAP SERPL CALC-SCNC: 31 MMOL/L (ref 8–16)
AST SERPL-CCNC: 283 U/L (ref 10–40)
BASOPHILS # BLD AUTO: 0.02 K/UL (ref 0–0.2)
BASOPHILS NFR BLD: 0.3 % (ref 0–1.9)
BILIRUB SERPL-MCNC: 1.9 MG/DL (ref 0.1–1)
BLD GP AB SCN CELLS X3 SERPL QL: NORMAL
BNP SERPL-MCNC: 105 PG/ML (ref 0–99)
BUN SERPL-MCNC: 25 MG/DL (ref 8–23)
CALCIUM SERPL-MCNC: 6.7 MG/DL (ref 8.7–10.5)
CHLORIDE SERPL-SCNC: 87 MMOL/L (ref 95–110)
CO2 SERPL-SCNC: 13 MMOL/L (ref 23–29)
CREAT SERPL-MCNC: 1.2 MG/DL (ref 0.5–1.4)
CTP QC/QA: YES
DELSYS: ABNORMAL
DIFFERENTIAL METHOD: ABNORMAL
EOSINOPHIL # BLD AUTO: 0 K/UL (ref 0–0.5)
EOSINOPHIL NFR BLD: 0 % (ref 0–8)
ERYTHROCYTE [DISTWIDTH] IN BLOOD BY AUTOMATED COUNT: 15.4 % (ref 11.5–14.5)
EST. GFR  (AFRICAN AMERICAN): 53 ML/MIN/1.73 M^2
EST. GFR  (NON AFRICAN AMERICAN): 46 ML/MIN/1.73 M^2
GLUCOSE SERPL-MCNC: 66 MG/DL (ref 70–110)
HCO3 UR-SCNC: 20.6 MMOL/L (ref 24–28)
HCT VFR BLD AUTO: 30.5 % (ref 37–48.5)
HGB BLD-MCNC: 10.2 G/DL (ref 12–16)
IMM GRANULOCYTES # BLD AUTO: 0.04 K/UL (ref 0–0.04)
IMM GRANULOCYTES NFR BLD AUTO: 0.6 % (ref 0–0.5)
INR PPP: 1.1 (ref 0.8–1.2)
LACTATE SERPL-SCNC: 0.8 MMOL/L (ref 0.5–2.2)
LYMPHOCYTES # BLD AUTO: 1.4 K/UL (ref 1–4.8)
LYMPHOCYTES NFR BLD: 19.4 % (ref 18–48)
MCH RBC QN AUTO: 30.1 PG (ref 27–31)
MCHC RBC AUTO-ENTMCNC: 33.4 G/DL (ref 32–36)
MCV RBC AUTO: 90 FL (ref 82–98)
MONOCYTES # BLD AUTO: 0.5 K/UL (ref 0.3–1)
MONOCYTES NFR BLD: 6.5 % (ref 4–15)
NEUTROPHILS # BLD AUTO: 5.1 K/UL (ref 1.8–7.7)
NEUTROPHILS NFR BLD: 73.2 % (ref 38–73)
NRBC BLD-RTO: 0 /100 WBC
PCO2 BLDA: 31.6 MMHG (ref 35–45)
PH SMN: 7.42 [PH] (ref 7.35–7.45)
PLATELET # BLD AUTO: 293 K/UL (ref 150–350)
PMV BLD AUTO: 9.8 FL (ref 9.2–12.9)
PO2 BLDA: 40 MMHG (ref 40–60)
POC BE: -4 MMOL/L
POC SATURATED O2: 77 % (ref 95–100)
POC TCO2: 22 MMOL/L (ref 24–29)
POCT GLUCOSE: 133 MG/DL (ref 70–110)
POCT GLUCOSE: 135 MG/DL (ref 70–110)
POCT GLUCOSE: 64 MG/DL (ref 70–110)
POTASSIUM SERPL-SCNC: 3.3 MMOL/L (ref 3.5–5.1)
PROT SERPL-MCNC: 6.3 G/DL (ref 6–8.4)
PROTHROMBIN TIME: 11.6 SEC (ref 9–12.5)
RBC # BLD AUTO: 3.39 M/UL (ref 4–5.4)
SAMPLE: ABNORMAL
SARS-COV-2 RDRP RESP QL NAA+PROBE: NEGATIVE
SITE: ABNORMAL
SODIUM SERPL-SCNC: 131 MMOL/L (ref 136–145)
TROPONIN I SERPL DL<=0.01 NG/ML-MCNC: 0.03 NG/ML (ref 0–0.03)
WBC # BLD AUTO: 6.95 K/UL (ref 3.9–12.7)

## 2021-03-22 PROCEDURE — 83880 ASSAY OF NATRIURETIC PEPTIDE: CPT | Performed by: EMERGENCY MEDICINE

## 2021-03-22 PROCEDURE — 84484 ASSAY OF TROPONIN QUANT: CPT | Performed by: EMERGENCY MEDICINE

## 2021-03-22 PROCEDURE — 85025 COMPLETE CBC W/AUTO DIFF WBC: CPT | Performed by: EMERGENCY MEDICINE

## 2021-03-22 PROCEDURE — 80053 COMPREHEN METABOLIC PANEL: CPT | Performed by: EMERGENCY MEDICINE

## 2021-03-22 PROCEDURE — 86900 BLOOD TYPING SEROLOGIC ABO: CPT | Performed by: EMERGENCY MEDICINE

## 2021-03-22 PROCEDURE — 93010 EKG 12-LEAD: ICD-10-PCS | Mod: ,,, | Performed by: INTERNAL MEDICINE

## 2021-03-22 PROCEDURE — 96365 THER/PROPH/DIAG IV INF INIT: CPT

## 2021-03-22 PROCEDURE — 63600175 PHARM REV CODE 636 W HCPCS: Performed by: EMERGENCY MEDICINE

## 2021-03-22 PROCEDURE — 93010 ELECTROCARDIOGRAM REPORT: CPT | Mod: ,,, | Performed by: INTERNAL MEDICINE

## 2021-03-22 PROCEDURE — U0002 COVID-19 LAB TEST NON-CDC: HCPCS | Performed by: EMERGENCY MEDICINE

## 2021-03-22 PROCEDURE — 82962 GLUCOSE BLOOD TEST: CPT

## 2021-03-22 PROCEDURE — 96375 TX/PRO/DX INJ NEW DRUG ADDON: CPT

## 2021-03-22 PROCEDURE — 96366 THER/PROPH/DIAG IV INF ADDON: CPT

## 2021-03-22 PROCEDURE — 12000002 HC ACUTE/MED SURGE SEMI-PRIVATE ROOM

## 2021-03-22 PROCEDURE — 36415 COLL VENOUS BLD VENIPUNCTURE: CPT | Performed by: EMERGENCY MEDICINE

## 2021-03-22 PROCEDURE — 96361 HYDRATE IV INFUSION ADD-ON: CPT

## 2021-03-22 PROCEDURE — 96367 TX/PROPH/DG ADDL SEQ IV INF: CPT

## 2021-03-22 PROCEDURE — 83605 ASSAY OF LACTIC ACID: CPT | Performed by: EMERGENCY MEDICINE

## 2021-03-22 PROCEDURE — 25500020 PHARM REV CODE 255: Performed by: EMERGENCY MEDICINE

## 2021-03-22 PROCEDURE — 82803 BLOOD GASES ANY COMBINATION: CPT

## 2021-03-22 PROCEDURE — 93005 ELECTROCARDIOGRAM TRACING: CPT

## 2021-03-22 PROCEDURE — 99900035 HC TECH TIME PER 15 MIN (STAT)

## 2021-03-22 PROCEDURE — 87040 BLOOD CULTURE FOR BACTERIA: CPT | Mod: 59 | Performed by: EMERGENCY MEDICINE

## 2021-03-22 PROCEDURE — 85610 PROTHROMBIN TIME: CPT | Performed by: EMERGENCY MEDICINE

## 2021-03-22 PROCEDURE — 99285 EMERGENCY DEPT VISIT HI MDM: CPT | Mod: 25

## 2021-03-22 RX ORDER — MORPHINE SULFATE 4 MG/ML
4 INJECTION, SOLUTION INTRAMUSCULAR; INTRAVENOUS
Status: COMPLETED | OUTPATIENT
Start: 2021-03-22 | End: 2021-03-22

## 2021-03-22 RX ORDER — POTASSIUM CHLORIDE 7.45 MG/ML
10 INJECTION INTRAVENOUS
Status: COMPLETED | OUTPATIENT
Start: 2021-03-22 | End: 2021-03-22

## 2021-03-22 RX ORDER — DEXTROSE MONOHYDRATE AND SODIUM CHLORIDE 5; .9 G/100ML; G/100ML
INJECTION, SOLUTION INTRAVENOUS
Status: COMPLETED | OUTPATIENT
Start: 2021-03-22 | End: 2021-03-23

## 2021-03-22 RX ADMIN — MORPHINE SULFATE 4 MG: 4 INJECTION, SOLUTION INTRAMUSCULAR; INTRAVENOUS at 03:03

## 2021-03-22 RX ADMIN — POTASSIUM CHLORIDE 10 MEQ: 7.46 INJECTION, SOLUTION INTRAVENOUS at 03:03

## 2021-03-22 RX ADMIN — DEXTROSE AND SODIUM CHLORIDE: 5; .9 INJECTION, SOLUTION INTRAVENOUS at 12:03

## 2021-03-22 RX ADMIN — PIPERACILLIN AND TAZOBACTAM 4.5 G: 4; .5 INJECTION, POWDER, LYOPHILIZED, FOR SOLUTION INTRAVENOUS; PARENTERAL at 07:03

## 2021-03-22 RX ADMIN — IOHEXOL 100 ML: 350 INJECTION, SOLUTION INTRAVENOUS at 01:03

## 2021-03-23 PROBLEM — K80.50 CHOLEDOCHOLITHIASIS: Status: ACTIVE | Noted: 2021-03-23

## 2021-03-23 PROBLEM — E55.9 VITAMIN D DEFICIENCY: Status: RESOLVED | Noted: 2019-08-20 | Resolved: 2021-03-23

## 2021-03-23 PROBLEM — E66.01 SEVERE OBESITY (BMI 35.0-39.9) WITH COMORBIDITY: Status: RESOLVED | Noted: 2019-02-20 | Resolved: 2021-03-23

## 2021-03-23 PROBLEM — E66.9 OBESITY (BMI 35.0-39.9 WITHOUT COMORBIDITY): Status: RESOLVED | Noted: 2019-08-30 | Resolved: 2021-03-23

## 2021-03-23 PROBLEM — R79.89 ELEVATED LFTS: Status: ACTIVE | Noted: 2021-03-23

## 2021-03-23 LAB
ABO + RH BLD: NORMAL
ALBUMIN SERPL BCP-MCNC: 2.5 G/DL (ref 3.5–5.2)
ALP SERPL-CCNC: 198 U/L (ref 55–135)
ALT SERPL W/O P-5'-P-CCNC: 90 U/L (ref 10–44)
ANION GAP SERPL CALC-SCNC: 17 MMOL/L (ref 8–16)
AST SERPL-CCNC: 221 U/L (ref 10–40)
BASOPHILS # BLD AUTO: 0.02 K/UL (ref 0–0.2)
BASOPHILS NFR BLD: 0.4 % (ref 0–1.9)
BILIRUB SERPL-MCNC: 1.6 MG/DL (ref 0.1–1)
BLD GP AB SCN CELLS X3 SERPL QL: NORMAL
BUN SERPL-MCNC: 22 MG/DL (ref 8–23)
CALCIUM SERPL-MCNC: 6.6 MG/DL (ref 8.7–10.5)
CHLORIDE SERPL-SCNC: 90 MMOL/L (ref 95–110)
CO2 SERPL-SCNC: 24 MMOL/L (ref 23–29)
CREAT SERPL-MCNC: 1.2 MG/DL (ref 0.5–1.4)
DIFFERENTIAL METHOD: ABNORMAL
EOSINOPHIL # BLD AUTO: 0.1 K/UL (ref 0–0.5)
EOSINOPHIL NFR BLD: 1.4 % (ref 0–8)
ERYTHROCYTE [DISTWIDTH] IN BLOOD BY AUTOMATED COUNT: 15.2 % (ref 11.5–14.5)
EST. GFR  (AFRICAN AMERICAN): 53 ML/MIN/1.73 M^2
EST. GFR  (NON AFRICAN AMERICAN): 46 ML/MIN/1.73 M^2
GLUCOSE SERPL-MCNC: 106 MG/DL (ref 70–110)
HCT VFR BLD AUTO: 29.4 % (ref 37–48.5)
HGB BLD-MCNC: 10.1 G/DL (ref 12–16)
IMM GRANULOCYTES # BLD AUTO: 0.02 K/UL (ref 0–0.04)
IMM GRANULOCYTES NFR BLD AUTO: 0.4 % (ref 0–0.5)
LIPASE SERPL-CCNC: 183 U/L (ref 4–60)
LYMPHOCYTES # BLD AUTO: 1 K/UL (ref 1–4.8)
LYMPHOCYTES NFR BLD: 18.2 % (ref 18–48)
MAGNESIUM SERPL-MCNC: 0.9 MG/DL (ref 1.6–2.6)
MCH RBC QN AUTO: 30.4 PG (ref 27–31)
MCHC RBC AUTO-ENTMCNC: 34.4 G/DL (ref 32–36)
MCV RBC AUTO: 89 FL (ref 82–98)
MONOCYTES # BLD AUTO: 0.4 K/UL (ref 0.3–1)
MONOCYTES NFR BLD: 7.3 % (ref 4–15)
NEUTROPHILS # BLD AUTO: 4.1 K/UL (ref 1.8–7.7)
NEUTROPHILS NFR BLD: 72.3 % (ref 38–73)
NRBC BLD-RTO: 0 /100 WBC
PHOSPHATE SERPL-MCNC: 1 MG/DL (ref 2.7–4.5)
PLATELET # BLD AUTO: 183 K/UL (ref 150–350)
PMV BLD AUTO: 9.7 FL (ref 9.2–12.9)
POCT GLUCOSE: 116 MG/DL (ref 70–110)
POTASSIUM SERPL-SCNC: 3 MMOL/L (ref 3.5–5.1)
PROT SERPL-MCNC: 6.2 G/DL (ref 6–8.4)
PTH-INTACT SERPL-MCNC: 549.4 PG/ML (ref 9–77)
RBC # BLD AUTO: 3.32 M/UL (ref 4–5.4)
SODIUM SERPL-SCNC: 131 MMOL/L (ref 136–145)
WBC # BLD AUTO: 5.65 K/UL (ref 3.9–12.7)

## 2021-03-23 PROCEDURE — 82306 VITAMIN D 25 HYDROXY: CPT | Performed by: INTERNAL MEDICINE

## 2021-03-23 PROCEDURE — 36415 COLL VENOUS BLD VENIPUNCTURE: CPT | Performed by: INTERNAL MEDICINE

## 2021-03-23 PROCEDURE — 36415 COLL VENOUS BLD VENIPUNCTURE: CPT | Performed by: EMERGENCY MEDICINE

## 2021-03-23 PROCEDURE — 25000003 PHARM REV CODE 250: Performed by: INTERNAL MEDICINE

## 2021-03-23 PROCEDURE — 86900 BLOOD TYPING SEROLOGIC ABO: CPT | Performed by: FAMILY MEDICINE

## 2021-03-23 PROCEDURE — 25000003 PHARM REV CODE 250: Performed by: EMERGENCY MEDICINE

## 2021-03-23 PROCEDURE — 63600175 PHARM REV CODE 636 W HCPCS: Performed by: INTERNAL MEDICINE

## 2021-03-23 PROCEDURE — 99223 1ST HOSP IP/OBS HIGH 75: CPT | Mod: AI,,, | Performed by: INTERNAL MEDICINE

## 2021-03-23 PROCEDURE — 85025 COMPLETE CBC W/AUTO DIFF WBC: CPT | Mod: 91 | Performed by: FAMILY MEDICINE

## 2021-03-23 PROCEDURE — 83735 ASSAY OF MAGNESIUM: CPT | Performed by: INTERNAL MEDICINE

## 2021-03-23 PROCEDURE — 83690 ASSAY OF LIPASE: CPT | Performed by: EMERGENCY MEDICINE

## 2021-03-23 PROCEDURE — 36415 COLL VENOUS BLD VENIPUNCTURE: CPT | Performed by: FAMILY MEDICINE

## 2021-03-23 PROCEDURE — 83970 ASSAY OF PARATHORMONE: CPT | Performed by: INTERNAL MEDICINE

## 2021-03-23 PROCEDURE — 80053 COMPREHEN METABOLIC PANEL: CPT | Performed by: EMERGENCY MEDICINE

## 2021-03-23 PROCEDURE — 20600001 HC STEP DOWN PRIVATE ROOM

## 2021-03-23 PROCEDURE — 63600175 PHARM REV CODE 636 W HCPCS: Performed by: EMERGENCY MEDICINE

## 2021-03-23 PROCEDURE — 84100 ASSAY OF PHOSPHORUS: CPT | Performed by: INTERNAL MEDICINE

## 2021-03-23 PROCEDURE — 85025 COMPLETE CBC W/AUTO DIFF WBC: CPT | Performed by: EMERGENCY MEDICINE

## 2021-03-23 PROCEDURE — 99223 PR INITIAL HOSPITAL CARE,LEVL III: ICD-10-PCS | Mod: AI,,, | Performed by: INTERNAL MEDICINE

## 2021-03-23 RX ORDER — PANTOPRAZOLE SODIUM 40 MG/1
40 TABLET, DELAYED RELEASE ORAL
Status: COMPLETED | OUTPATIENT
Start: 2021-03-23 | End: 2021-03-23

## 2021-03-23 RX ORDER — IBUPROFEN 200 MG
24 TABLET ORAL
Status: DISCONTINUED | OUTPATIENT
Start: 2021-03-23 | End: 2021-03-27 | Stop reason: HOSPADM

## 2021-03-23 RX ORDER — ALBUTEROL SULFATE 90 UG/1
2 AEROSOL, METERED RESPIRATORY (INHALATION) EVERY 8 HOURS
Status: DISCONTINUED | OUTPATIENT
Start: 2021-03-23 | End: 2021-03-23

## 2021-03-23 RX ORDER — ATORVASTATIN CALCIUM 20 MG/1
40 TABLET, FILM COATED ORAL NIGHTLY
Status: DISCONTINUED | OUTPATIENT
Start: 2021-03-23 | End: 2021-03-27 | Stop reason: HOSPADM

## 2021-03-23 RX ORDER — SODIUM CHLORIDE 0.9 % (FLUSH) 0.9 %
10 SYRINGE (ML) INJECTION
Status: DISCONTINUED | OUTPATIENT
Start: 2021-03-23 | End: 2021-03-27 | Stop reason: HOSPADM

## 2021-03-23 RX ORDER — ACETAMINOPHEN 325 MG/1
650 TABLET ORAL EVERY 4 HOURS PRN
Status: DISCONTINUED | OUTPATIENT
Start: 2021-03-23 | End: 2021-03-27 | Stop reason: HOSPADM

## 2021-03-23 RX ORDER — ONDANSETRON 2 MG/ML
4 INJECTION INTRAMUSCULAR; INTRAVENOUS EVERY 6 HOURS PRN
Status: DISCONTINUED | OUTPATIENT
Start: 2021-03-23 | End: 2021-03-27 | Stop reason: HOSPADM

## 2021-03-23 RX ORDER — POTASSIUM CHLORIDE 20 MEQ/1
20 TABLET, EXTENDED RELEASE ORAL
Status: DISCONTINUED | OUTPATIENT
Start: 2021-03-23 | End: 2021-03-23

## 2021-03-23 RX ORDER — DEXTROSE MONOHYDRATE, SODIUM CHLORIDE, AND POTASSIUM CHLORIDE 50; 2.98; 4.5 G/1000ML; G/1000ML; G/1000ML
INJECTION, SOLUTION INTRAVENOUS
Status: DISCONTINUED | OUTPATIENT
Start: 2021-03-23 | End: 2021-03-23

## 2021-03-23 RX ORDER — LOSARTAN POTASSIUM 50 MG/1
100 TABLET ORAL DAILY
Status: DISCONTINUED | OUTPATIENT
Start: 2021-03-23 | End: 2021-03-23

## 2021-03-23 RX ORDER — MAGNESIUM SULFATE HEPTAHYDRATE 40 MG/ML
2 INJECTION, SOLUTION INTRAVENOUS ONCE
Status: COMPLETED | OUTPATIENT
Start: 2021-03-23 | End: 2021-03-23

## 2021-03-23 RX ORDER — ALBUTEROL SULFATE 90 UG/1
2 AEROSOL, METERED RESPIRATORY (INHALATION) EVERY 6 HOURS PRN
Status: DISCONTINUED | OUTPATIENT
Start: 2021-03-23 | End: 2021-03-27 | Stop reason: HOSPADM

## 2021-03-23 RX ORDER — HYDROCODONE BITARTRATE AND ACETAMINOPHEN 5; 325 MG/1; MG/1
1 TABLET ORAL
Status: COMPLETED | OUTPATIENT
Start: 2021-03-23 | End: 2021-03-23

## 2021-03-23 RX ORDER — IBUPROFEN 200 MG
16 TABLET ORAL
Status: DISCONTINUED | OUTPATIENT
Start: 2021-03-23 | End: 2021-03-27 | Stop reason: HOSPADM

## 2021-03-23 RX ORDER — CHOLECALCIFEROL (VITAMIN D3) 25 MCG
1000 TABLET ORAL DAILY
Status: DISCONTINUED | OUTPATIENT
Start: 2021-03-24 | End: 2021-03-27 | Stop reason: HOSPADM

## 2021-03-23 RX ORDER — MORPHINE SULFATE 2 MG/ML
3 INJECTION, SOLUTION INTRAMUSCULAR; INTRAVENOUS EVERY 4 HOURS PRN
Status: DISCONTINUED | OUTPATIENT
Start: 2021-03-23 | End: 2021-03-27 | Stop reason: HOSPADM

## 2021-03-23 RX ORDER — TALC
6 POWDER (GRAM) TOPICAL NIGHTLY PRN
Status: DISCONTINUED | OUTPATIENT
Start: 2021-03-23 | End: 2021-03-27 | Stop reason: HOSPADM

## 2021-03-23 RX ORDER — PANTOPRAZOLE SODIUM 40 MG/1
40 TABLET, DELAYED RELEASE ORAL DAILY
Status: DISCONTINUED | OUTPATIENT
Start: 2021-03-24 | End: 2021-03-24

## 2021-03-23 RX ORDER — AMLODIPINE BESYLATE 10 MG/1
10 TABLET ORAL DAILY
Status: DISCONTINUED | OUTPATIENT
Start: 2021-03-23 | End: 2021-03-24

## 2021-03-23 RX ORDER — FLUTICASONE FUROATE AND VILANTEROL 100; 25 UG/1; UG/1
1 POWDER RESPIRATORY (INHALATION) DAILY
Refills: 3 | Status: DISCONTINUED | OUTPATIENT
Start: 2021-03-24 | End: 2021-03-27 | Stop reason: HOSPADM

## 2021-03-23 RX ORDER — GLUCAGON 1 MG
1 KIT INJECTION
Status: DISCONTINUED | OUTPATIENT
Start: 2021-03-23 | End: 2021-03-27 | Stop reason: HOSPADM

## 2021-03-23 RX ADMIN — PIPERACILLIN AND TAZOBACTAM 4.5 G: 4; .5 INJECTION, POWDER, LYOPHILIZED, FOR SOLUTION INTRAVENOUS; PARENTERAL at 10:03

## 2021-03-23 RX ADMIN — AMLODIPINE BESYLATE 10 MG: 5 TABLET ORAL at 12:03

## 2021-03-23 RX ADMIN — SODIUM CHLORIDE 1000 ML: 0.9 INJECTION, SOLUTION INTRAVENOUS at 03:03

## 2021-03-23 RX ADMIN — POTASSIUM PHOSPHATE, MONOBASIC AND POTASSIUM PHOSPHATE, DIBASIC 20 MMOL: 224; 236 INJECTION, SOLUTION, CONCENTRATE INTRAVENOUS at 07:03

## 2021-03-23 RX ADMIN — ATORVASTATIN CALCIUM 40 MG: 20 TABLET, FILM COATED ORAL at 10:03

## 2021-03-23 RX ADMIN — ACETAMINOPHEN 650 MG: 325 TABLET ORAL at 10:03

## 2021-03-23 RX ADMIN — PANTOPRAZOLE SODIUM 40 MG: 40 TABLET, DELAYED RELEASE ORAL at 12:03

## 2021-03-23 RX ADMIN — HYDROCODONE BITARTRATE AND ACETAMINOPHEN 1 TABLET: 5; 325 TABLET ORAL at 09:03

## 2021-03-23 RX ADMIN — MAGNESIUM SULFATE 2 G: 2 INJECTION INTRAVENOUS at 05:03

## 2021-03-23 RX ADMIN — PIPERACILLIN AND TAZOBACTAM 4.5 G: 4; .5 INJECTION, POWDER, LYOPHILIZED, FOR SOLUTION INTRAVENOUS; PARENTERAL at 04:03

## 2021-03-23 RX ADMIN — LOSARTAN POTASSIUM 100 MG: 50 TABLET, FILM COATED ORAL at 12:03

## 2021-03-24 ENCOUNTER — ANESTHESIA (OUTPATIENT)
Dept: ENDOSCOPY | Facility: HOSPITAL | Age: 71
DRG: 445 | End: 2021-03-24
Payer: MEDICARE

## 2021-03-24 ENCOUNTER — ANESTHESIA EVENT (OUTPATIENT)
Dept: ENDOSCOPY | Facility: HOSPITAL | Age: 71
DRG: 445 | End: 2021-03-24
Payer: MEDICARE

## 2021-03-24 LAB
25(OH)D3+25(OH)D2 SERPL-MCNC: 38 NG/ML (ref 30–96)
ALBUMIN SERPL BCP-MCNC: 2 G/DL (ref 3.5–5.2)
ALP SERPL-CCNC: 163 U/L (ref 55–135)
ALT SERPL W/O P-5'-P-CCNC: 68 U/L (ref 10–44)
ANION GAP SERPL CALC-SCNC: 13 MMOL/L (ref 8–16)
ANION GAP SERPL CALC-SCNC: 14 MMOL/L (ref 8–16)
AST SERPL-CCNC: 144 U/L (ref 10–40)
BASOPHILS # BLD AUTO: 0.01 K/UL (ref 0–0.2)
BASOPHILS # BLD AUTO: 0.02 K/UL (ref 0–0.2)
BASOPHILS # BLD AUTO: 0.02 K/UL (ref 0–0.2)
BASOPHILS NFR BLD: 0.3 % (ref 0–1.9)
BASOPHILS NFR BLD: 0.5 % (ref 0–1.9)
BASOPHILS NFR BLD: 0.6 % (ref 0–1.9)
BILIRUB SERPL-MCNC: 0.9 MG/DL (ref 0.1–1)
BUN SERPL-MCNC: 20 MG/DL (ref 8–23)
BUN SERPL-MCNC: 21 MG/DL (ref 8–23)
CA-I BLDV-SCNC: 0.76 MMOL/L (ref 1.06–1.42)
CALCIUM SERPL-MCNC: 5.6 MG/DL (ref 8.7–10.5)
CALCIUM SERPL-MCNC: 5.7 MG/DL (ref 8.7–10.5)
CHLORIDE SERPL-SCNC: 89 MMOL/L (ref 95–110)
CHLORIDE SERPL-SCNC: 93 MMOL/L (ref 95–110)
CO2 SERPL-SCNC: 24 MMOL/L (ref 23–29)
CO2 SERPL-SCNC: 24 MMOL/L (ref 23–29)
CORTIS SERPL-MCNC: 13.8 UG/DL
CREAT SERPL-MCNC: 1.4 MG/DL (ref 0.5–1.4)
CREAT SERPL-MCNC: 1.7 MG/DL (ref 0.5–1.4)
DIFFERENTIAL METHOD: ABNORMAL
EOSINOPHIL # BLD AUTO: 0.1 K/UL (ref 0–0.5)
EOSINOPHIL NFR BLD: 2.1 % (ref 0–8)
EOSINOPHIL NFR BLD: 2.2 % (ref 0–8)
EOSINOPHIL NFR BLD: 2.7 % (ref 0–8)
ERYTHROCYTE [DISTWIDTH] IN BLOOD BY AUTOMATED COUNT: 15.1 % (ref 11.5–14.5)
ERYTHROCYTE [DISTWIDTH] IN BLOOD BY AUTOMATED COUNT: 15.3 % (ref 11.5–14.5)
ERYTHROCYTE [DISTWIDTH] IN BLOOD BY AUTOMATED COUNT: 15.6 % (ref 11.5–14.5)
ERYTHROCYTE [DISTWIDTH] IN BLOOD BY AUTOMATED COUNT: 15.7 % (ref 11.5–14.5)
EST. GFR  (AFRICAN AMERICAN): 34.5 ML/MIN/1.73 M^2
EST. GFR  (AFRICAN AMERICAN): 43.6 ML/MIN/1.73 M^2
EST. GFR  (NON AFRICAN AMERICAN): 29.9 ML/MIN/1.73 M^2
EST. GFR  (NON AFRICAN AMERICAN): 37.8 ML/MIN/1.73 M^2
GLUCOSE SERPL-MCNC: 115 MG/DL (ref 70–110)
GLUCOSE SERPL-MCNC: 94 MG/DL (ref 70–110)
HCT VFR BLD AUTO: 24 % (ref 37–48.5)
HCT VFR BLD AUTO: 25.2 % (ref 37–48.5)
HCT VFR BLD AUTO: 26.7 % (ref 37–48.5)
HCT VFR BLD AUTO: 26.9 % (ref 37–48.5)
HGB BLD-MCNC: 8.3 G/DL (ref 12–16)
HGB BLD-MCNC: 8.7 G/DL (ref 12–16)
HGB BLD-MCNC: 8.9 G/DL (ref 12–16)
HGB BLD-MCNC: 8.9 G/DL (ref 12–16)
IMM GRANULOCYTES # BLD AUTO: 0.02 K/UL (ref 0–0.04)
IMM GRANULOCYTES # BLD AUTO: 0.02 K/UL (ref 0–0.04)
IMM GRANULOCYTES # BLD AUTO: 0.03 K/UL (ref 0–0.04)
IMM GRANULOCYTES NFR BLD AUTO: 0.5 % (ref 0–0.5)
IMM GRANULOCYTES NFR BLD AUTO: 0.5 % (ref 0–0.5)
IMM GRANULOCYTES NFR BLD AUTO: 0.8 % (ref 0–0.5)
LYMPHOCYTES # BLD AUTO: 0.7 K/UL (ref 1–4.8)
LYMPHOCYTES # BLD AUTO: 0.8 K/UL (ref 1–4.8)
LYMPHOCYTES # BLD AUTO: 1 K/UL (ref 1–4.8)
LYMPHOCYTES NFR BLD: 20.2 % (ref 18–48)
LYMPHOCYTES NFR BLD: 21.2 % (ref 18–48)
LYMPHOCYTES NFR BLD: 26 % (ref 18–48)
MAGNESIUM SERPL-MCNC: 1.2 MG/DL (ref 1.6–2.6)
MCH RBC QN AUTO: 30.2 PG (ref 27–31)
MCH RBC QN AUTO: 30.3 PG (ref 27–31)
MCH RBC QN AUTO: 30.7 PG (ref 27–31)
MCH RBC QN AUTO: 31.2 PG (ref 27–31)
MCHC RBC AUTO-ENTMCNC: 33.1 G/DL (ref 32–36)
MCHC RBC AUTO-ENTMCNC: 33.3 G/DL (ref 32–36)
MCHC RBC AUTO-ENTMCNC: 34.5 G/DL (ref 32–36)
MCHC RBC AUTO-ENTMCNC: 34.6 G/DL (ref 32–36)
MCV RBC AUTO: 88 FL (ref 82–98)
MCV RBC AUTO: 90 FL (ref 82–98)
MCV RBC AUTO: 91 FL (ref 82–98)
MCV RBC AUTO: 92 FL (ref 82–98)
MONOCYTES # BLD AUTO: 0.3 K/UL (ref 0.3–1)
MONOCYTES # BLD AUTO: 0.4 K/UL (ref 0.3–1)
MONOCYTES # BLD AUTO: 0.4 K/UL (ref 0.3–1)
MONOCYTES NFR BLD: 10.4 % (ref 4–15)
MONOCYTES NFR BLD: 8.6 % (ref 4–15)
MONOCYTES NFR BLD: 9.7 % (ref 4–15)
NEUTROPHILS # BLD AUTO: 2.2 K/UL (ref 1.8–7.7)
NEUTROPHILS # BLD AUTO: 2.4 K/UL (ref 1.8–7.7)
NEUTROPHILS # BLD AUTO: 2.6 K/UL (ref 1.8–7.7)
NEUTROPHILS NFR BLD: 59.9 % (ref 38–73)
NEUTROPHILS NFR BLD: 66.5 % (ref 38–73)
NEUTROPHILS NFR BLD: 67.3 % (ref 38–73)
NRBC BLD-RTO: 0 /100 WBC
OSMOLALITY SERPL: 265 MOSM/KG (ref 275–295)
PHOSPHATE SERPL-MCNC: 1.9 MG/DL (ref 2.7–4.5)
PLATELET # BLD AUTO: 146 K/UL (ref 150–350)
PLATELET # BLD AUTO: 147 K/UL (ref 150–350)
PLATELET # BLD AUTO: 153 K/UL (ref 150–350)
PLATELET # BLD AUTO: 167 K/UL (ref 150–350)
PLATELET BLD QL SMEAR: ABNORMAL
PMV BLD AUTO: 10.2 FL (ref 9.2–12.9)
PMV BLD AUTO: 10.2 FL (ref 9.2–12.9)
PMV BLD AUTO: 10.3 FL (ref 9.2–12.9)
PMV BLD AUTO: 10.3 FL (ref 9.2–12.9)
POTASSIUM SERPL-SCNC: 3 MMOL/L (ref 3.5–5.1)
POTASSIUM SERPL-SCNC: 3 MMOL/L (ref 3.5–5.1)
PROT SERPL-MCNC: 4.8 G/DL (ref 6–8.4)
PTH-INTACT SERPL-MCNC: 418 PG/ML (ref 9–77)
RBC # BLD AUTO: 2.74 M/UL (ref 4–5.4)
RBC # BLD AUTO: 2.79 M/UL (ref 4–5.4)
RBC # BLD AUTO: 2.9 M/UL (ref 4–5.4)
RBC # BLD AUTO: 2.95 M/UL (ref 4–5.4)
SODIUM SERPL-SCNC: 127 MMOL/L (ref 136–145)
SODIUM SERPL-SCNC: 130 MMOL/L (ref 136–145)
T4 FREE SERPL-MCNC: 1.1 NG/DL (ref 0.71–1.51)
TSH SERPL DL<=0.005 MIU/L-ACNC: 1.89 UIU/ML (ref 0.4–4)
WBC # BLD AUTO: 3.2 K/UL (ref 3.9–12.7)
WBC # BLD AUTO: 3.61 K/UL (ref 3.9–12.7)
WBC # BLD AUTO: 3.65 K/UL (ref 3.9–12.7)
WBC # BLD AUTO: 3.82 K/UL (ref 3.9–12.7)

## 2021-03-24 PROCEDURE — 80053 COMPREHEN METABOLIC PANEL: CPT | Performed by: INTERNAL MEDICINE

## 2021-03-24 PROCEDURE — 82330 ASSAY OF CALCIUM: CPT | Performed by: HOSPITALIST

## 2021-03-24 PROCEDURE — 25000242 PHARM REV CODE 250 ALT 637 W/ HCPCS: Performed by: INTERNAL MEDICINE

## 2021-03-24 PROCEDURE — 36415 COLL VENOUS BLD VENIPUNCTURE: CPT | Performed by: STUDENT IN AN ORGANIZED HEALTH CARE EDUCATION/TRAINING PROGRAM

## 2021-03-24 PROCEDURE — 27000221 HC OXYGEN, UP TO 24 HOURS

## 2021-03-24 PROCEDURE — 43259 EGD US EXAM DUODENUM/JEJUNUM: CPT | Performed by: INTERNAL MEDICINE

## 2021-03-24 PROCEDURE — 27201674 HC SPHINCTERTOME: Performed by: INTERNAL MEDICINE

## 2021-03-24 PROCEDURE — 94640 AIRWAY INHALATION TREATMENT: CPT

## 2021-03-24 PROCEDURE — 63600175 PHARM REV CODE 636 W HCPCS: Performed by: HOSPITALIST

## 2021-03-24 PROCEDURE — 43259 PR ENDOSCOPIC ULTRASOUND EXAM: ICD-10-PCS | Mod: 51,,, | Performed by: INTERNAL MEDICINE

## 2021-03-24 PROCEDURE — 83735 ASSAY OF MAGNESIUM: CPT | Performed by: INTERNAL MEDICINE

## 2021-03-24 PROCEDURE — 36415 COLL VENOUS BLD VENIPUNCTURE: CPT | Performed by: HOSPITALIST

## 2021-03-24 PROCEDURE — 25000003 PHARM REV CODE 250: Performed by: STUDENT IN AN ORGANIZED HEALTH CARE EDUCATION/TRAINING PROGRAM

## 2021-03-24 PROCEDURE — 80048 BASIC METABOLIC PNL TOTAL CA: CPT | Performed by: STUDENT IN AN ORGANIZED HEALTH CARE EDUCATION/TRAINING PROGRAM

## 2021-03-24 PROCEDURE — 74328 X-RAY BILE DUCT ENDOSCOPY: CPT | Performed by: INTERNAL MEDICINE

## 2021-03-24 PROCEDURE — 74328 X-RAY BILE DUCT ENDOSCOPY: CPT | Mod: 26,,, | Performed by: INTERNAL MEDICINE

## 2021-03-24 PROCEDURE — 63600175 PHARM REV CODE 636 W HCPCS: Performed by: STUDENT IN AN ORGANIZED HEALTH CARE EDUCATION/TRAINING PROGRAM

## 2021-03-24 PROCEDURE — 27202127 HC STENT INTRODUCER: Performed by: INTERNAL MEDICINE

## 2021-03-24 PROCEDURE — 84439 ASSAY OF FREE THYROXINE: CPT | Performed by: HOSPITALIST

## 2021-03-24 PROCEDURE — 36415 COLL VENOUS BLD VENIPUNCTURE: CPT | Performed by: FAMILY MEDICINE

## 2021-03-24 PROCEDURE — 83970 ASSAY OF PARATHORMONE: CPT | Performed by: HOSPITALIST

## 2021-03-24 PROCEDURE — 25000003 PHARM REV CODE 250: Performed by: NURSE ANESTHETIST, CERTIFIED REGISTERED

## 2021-03-24 PROCEDURE — 85027 COMPLETE CBC AUTOMATED: CPT | Performed by: INTERNAL MEDICINE

## 2021-03-24 PROCEDURE — 99233 PR SUBSEQUENT HOSPITAL CARE,LEVL III: ICD-10-PCS | Mod: GC,,, | Performed by: INTERNAL MEDICINE

## 2021-03-24 PROCEDURE — 74328 PR  X-RAY FOR BILE DUCT ENDOSCOPY: ICD-10-PCS | Mod: 26,,, | Performed by: INTERNAL MEDICINE

## 2021-03-24 PROCEDURE — 43274 PR ERCP W/STENT PLCMNT BILIARY/PANCREATIC DUCT: ICD-10-PCS | Mod: ,,, | Performed by: INTERNAL MEDICINE

## 2021-03-24 PROCEDURE — 94761 N-INVAS EAR/PLS OXIMETRY MLT: CPT

## 2021-03-24 PROCEDURE — 25000003 PHARM REV CODE 250: Performed by: HOSPITALIST

## 2021-03-24 PROCEDURE — D9220A PRA ANESTHESIA: Mod: ANES,,, | Performed by: ANESTHESIOLOGY

## 2021-03-24 PROCEDURE — 99223 PR INITIAL HOSPITAL CARE,LEVL III: ICD-10-PCS | Mod: 25,,, | Performed by: INTERNAL MEDICINE

## 2021-03-24 PROCEDURE — D9220A PRA ANESTHESIA: ICD-10-PCS | Mod: CRNA,,, | Performed by: NURSE ANESTHETIST, CERTIFIED REGISTERED

## 2021-03-24 PROCEDURE — 84100 ASSAY OF PHOSPHORUS: CPT | Performed by: INTERNAL MEDICINE

## 2021-03-24 PROCEDURE — D9220A PRA ANESTHESIA: ICD-10-PCS | Mod: ANES,,, | Performed by: ANESTHESIOLOGY

## 2021-03-24 PROCEDURE — 36415 COLL VENOUS BLD VENIPUNCTURE: CPT | Performed by: INTERNAL MEDICINE

## 2021-03-24 PROCEDURE — C2617 STENT, NON-COR, TEM W/O DEL: HCPCS | Performed by: INTERNAL MEDICINE

## 2021-03-24 PROCEDURE — 43264 PR ERCP,W/REMOVAL STONE,BIL/PANCR DUCTS: ICD-10-PCS | Mod: 51,,, | Performed by: INTERNAL MEDICINE

## 2021-03-24 PROCEDURE — 43264 ERCP REMOVE DUCT CALCULI: CPT | Mod: 51,,, | Performed by: INTERNAL MEDICINE

## 2021-03-24 PROCEDURE — 25500020 PHARM REV CODE 255: Performed by: INTERNAL MEDICINE

## 2021-03-24 PROCEDURE — C9113 INJ PANTOPRAZOLE SODIUM, VIA: HCPCS | Performed by: STUDENT IN AN ORGANIZED HEALTH CARE EDUCATION/TRAINING PROGRAM

## 2021-03-24 PROCEDURE — 84443 ASSAY THYROID STIM HORMONE: CPT | Performed by: HOSPITALIST

## 2021-03-24 PROCEDURE — 63600175 PHARM REV CODE 636 W HCPCS: Performed by: INTERNAL MEDICINE

## 2021-03-24 PROCEDURE — 43274 ERCP DUCT STENT PLACEMENT: CPT | Mod: ,,, | Performed by: INTERNAL MEDICINE

## 2021-03-24 PROCEDURE — 63600175 PHARM REV CODE 636 W HCPCS: Performed by: NURSE ANESTHETIST, CERTIFIED REGISTERED

## 2021-03-24 PROCEDURE — 99233 SBSQ HOSP IP/OBS HIGH 50: CPT | Mod: GC,,, | Performed by: INTERNAL MEDICINE

## 2021-03-24 PROCEDURE — 83930 ASSAY OF BLOOD OSMOLALITY: CPT | Performed by: HOSPITALIST

## 2021-03-24 PROCEDURE — 37000009 HC ANESTHESIA EA ADD 15 MINS: Performed by: INTERNAL MEDICINE

## 2021-03-24 PROCEDURE — 85025 COMPLETE CBC W/AUTO DIFF WBC: CPT | Performed by: FAMILY MEDICINE

## 2021-03-24 PROCEDURE — 82533 TOTAL CORTISOL: CPT | Performed by: HOSPITALIST

## 2021-03-24 PROCEDURE — 25000003 PHARM REV CODE 250: Performed by: INTERNAL MEDICINE

## 2021-03-24 PROCEDURE — 20600001 HC STEP DOWN PRIVATE ROOM

## 2021-03-24 PROCEDURE — 37000008 HC ANESTHESIA 1ST 15 MINUTES: Performed by: INTERNAL MEDICINE

## 2021-03-24 PROCEDURE — 43274 ERCP DUCT STENT PLACEMENT: CPT | Performed by: INTERNAL MEDICINE

## 2021-03-24 PROCEDURE — D9220A PRA ANESTHESIA: Mod: CRNA,,, | Performed by: NURSE ANESTHETIST, CERTIFIED REGISTERED

## 2021-03-24 PROCEDURE — 99223 1ST HOSP IP/OBS HIGH 75: CPT | Mod: 25,,, | Performed by: INTERNAL MEDICINE

## 2021-03-24 PROCEDURE — C1769 GUIDE WIRE: HCPCS | Performed by: INTERNAL MEDICINE

## 2021-03-24 PROCEDURE — 43259 EGD US EXAM DUODENUM/JEJUNUM: CPT | Mod: 51,,, | Performed by: INTERNAL MEDICINE

## 2021-03-24 PROCEDURE — 27202125 HC BALLOON, EXTRACTION (ANY): Performed by: INTERNAL MEDICINE

## 2021-03-24 PROCEDURE — 43264 ERCP REMOVE DUCT CALCULI: CPT | Performed by: INTERNAL MEDICINE

## 2021-03-24 RX ORDER — SODIUM,POTASSIUM PHOSPHATES 280-250MG
2 POWDER IN PACKET (EA) ORAL ONCE
Status: COMPLETED | OUTPATIENT
Start: 2021-03-24 | End: 2021-03-24

## 2021-03-24 RX ORDER — LIDOCAINE HYDROCHLORIDE 20 MG/ML
JELLY TOPICAL
Status: DISCONTINUED | OUTPATIENT
Start: 2021-03-24 | End: 2021-03-24

## 2021-03-24 RX ORDER — LIDOCAINE HYDROCHLORIDE 20 MG/ML
INJECTION, SOLUTION EPIDURAL; INFILTRATION; INTRACAUDAL; PERINEURAL
Status: DISCONTINUED | OUTPATIENT
Start: 2021-03-24 | End: 2021-03-24

## 2021-03-24 RX ORDER — PANTOPRAZOLE SODIUM 40 MG/10ML
40 INJECTION, POWDER, LYOPHILIZED, FOR SOLUTION INTRAVENOUS EVERY 12 HOURS
Status: DISCONTINUED | OUTPATIENT
Start: 2021-03-25 | End: 2021-03-24

## 2021-03-24 RX ORDER — MAGNESIUM SULFATE HEPTAHYDRATE 40 MG/ML
2 INJECTION, SOLUTION INTRAVENOUS ONCE
Status: COMPLETED | OUTPATIENT
Start: 2021-03-24 | End: 2021-03-24

## 2021-03-24 RX ORDER — IPRATROPIUM BROMIDE AND ALBUTEROL SULFATE 2.5; .5 MG/3ML; MG/3ML
3 SOLUTION RESPIRATORY (INHALATION) EVERY 4 HOURS PRN
Status: DISCONTINUED | OUTPATIENT
Start: 2021-03-24 | End: 2021-03-27 | Stop reason: HOSPADM

## 2021-03-24 RX ORDER — CALCIUM CHLORIDE INJECTION 100 MG/ML
1 INJECTION, SOLUTION INTRAVENOUS ONCE
Status: DISCONTINUED | OUTPATIENT
Start: 2021-03-24 | End: 2021-03-24

## 2021-03-24 RX ORDER — PANTOPRAZOLE SODIUM 40 MG/1
40 TABLET, DELAYED RELEASE ORAL
Status: DISCONTINUED | OUTPATIENT
Start: 2021-03-25 | End: 2021-03-27 | Stop reason: HOSPADM

## 2021-03-24 RX ORDER — FENTANYL CITRATE 50 UG/ML
INJECTION, SOLUTION INTRAMUSCULAR; INTRAVENOUS
Status: DISCONTINUED | OUTPATIENT
Start: 2021-03-24 | End: 2021-03-24

## 2021-03-24 RX ORDER — HYDROMORPHONE HYDROCHLORIDE 1 MG/ML
0.2 INJECTION, SOLUTION INTRAMUSCULAR; INTRAVENOUS; SUBCUTANEOUS EVERY 5 MIN PRN
Status: DISCONTINUED | OUTPATIENT
Start: 2021-03-24 | End: 2021-03-24 | Stop reason: HOSPADM

## 2021-03-24 RX ORDER — PANTOPRAZOLE SODIUM 40 MG/10ML
40 INJECTION, POWDER, LYOPHILIZED, FOR SOLUTION INTRAVENOUS ONCE
Status: COMPLETED | OUTPATIENT
Start: 2021-03-24 | End: 2021-03-24

## 2021-03-24 RX ORDER — PHENYLEPHRINE HYDROCHLORIDE 10 MG/ML
INJECTION INTRAVENOUS
Status: DISCONTINUED | OUTPATIENT
Start: 2021-03-24 | End: 2021-03-24

## 2021-03-24 RX ORDER — ONDANSETRON 2 MG/ML
INJECTION INTRAMUSCULAR; INTRAVENOUS
Status: DISCONTINUED | OUTPATIENT
Start: 2021-03-24 | End: 2021-03-24

## 2021-03-24 RX ORDER — PROPOFOL 10 MG/ML
VIAL (ML) INTRAVENOUS
Status: DISCONTINUED | OUTPATIENT
Start: 2021-03-24 | End: 2021-03-24

## 2021-03-24 RX ORDER — POTASSIUM CHLORIDE 20 MEQ/1
40 TABLET, EXTENDED RELEASE ORAL ONCE
Status: COMPLETED | OUTPATIENT
Start: 2021-03-24 | End: 2021-03-24

## 2021-03-24 RX ORDER — INDOMETHACIN 50 MG/1
SUPPOSITORY RECTAL
Status: COMPLETED | OUTPATIENT
Start: 2021-03-24 | End: 2021-03-24

## 2021-03-24 RX ORDER — ONDANSETRON 2 MG/ML
INJECTION INTRAMUSCULAR; INTRAVENOUS
Status: COMPLETED
Start: 2021-03-24 | End: 2021-03-24

## 2021-03-24 RX ORDER — LIDOCAINE HYDROCHLORIDE 20 MG/ML
SOLUTION OROPHARYNGEAL
Status: DISCONTINUED | OUTPATIENT
Start: 2021-03-24 | End: 2021-03-24

## 2021-03-24 RX ORDER — PROPOFOL 10 MG/ML
VIAL (ML) INTRAVENOUS CONTINUOUS PRN
Status: DISCONTINUED | OUTPATIENT
Start: 2021-03-24 | End: 2021-03-24

## 2021-03-24 RX ADMIN — PHENYLEPHRINE HYDROCHLORIDE 200 MCG: 10 INJECTION INTRAVENOUS at 01:03

## 2021-03-24 RX ADMIN — ATORVASTATIN CALCIUM 40 MG: 20 TABLET, FILM COATED ORAL at 09:03

## 2021-03-24 RX ADMIN — POTASSIUM & SODIUM PHOSPHATES POWDER PACK 280-160-250 MG 2 PACKET: 280-160-250 PACK at 05:03

## 2021-03-24 RX ADMIN — SODIUM PHOSPHATE, MONOBASIC, MONOHYDRATE 15 MMOL: 276; 142 INJECTION, SOLUTION INTRAVENOUS at 08:03

## 2021-03-24 RX ADMIN — FENTANYL CITRATE 50 MCG: 50 INJECTION INTRAMUSCULAR; INTRAVENOUS at 02:03

## 2021-03-24 RX ADMIN — Medication 1000 UNITS: at 09:03

## 2021-03-24 RX ADMIN — PROPOFOL 150 MCG/KG/MIN: 10 INJECTION, EMULSION INTRAVENOUS at 01:03

## 2021-03-24 RX ADMIN — ONDANSETRON 4 MG: 2 INJECTION, SOLUTION INTRAMUSCULAR; INTRAVENOUS at 02:03

## 2021-03-24 RX ADMIN — PANTOPRAZOLE SODIUM 40 MG: 40 INJECTION, POWDER, FOR SOLUTION INTRAVENOUS at 09:03

## 2021-03-24 RX ADMIN — LIDOCAINE HYDROCHLORIDE 1 ML: 20 JELLY TOPICAL at 01:03

## 2021-03-24 RX ADMIN — PIPERACILLIN AND TAZOBACTAM 4.5 G: 4; .5 INJECTION, POWDER, LYOPHILIZED, FOR SOLUTION INTRAVENOUS; PARENTERAL at 08:03

## 2021-03-24 RX ADMIN — FLUTICASONE FUROATE AND VILANTEROL TRIFENATATE 1 PUFF: 100; 25 POWDER RESPIRATORY (INHALATION) at 09:03

## 2021-03-24 RX ADMIN — CALCIUM GLUCONATE 3000 MG: 98 INJECTION, SOLUTION INTRAVENOUS at 09:03

## 2021-03-24 RX ADMIN — INDOMETHACIN 100 MG: 50 SUPPOSITORY RECTAL at 01:03

## 2021-03-24 RX ADMIN — FENTANYL CITRATE 100 MCG: 50 INJECTION INTRAMUSCULAR; INTRAVENOUS at 01:03

## 2021-03-24 RX ADMIN — LIDOCAINE HYDROCHLORIDE 10 ML: 20 SOLUTION ORAL; TOPICAL at 01:03

## 2021-03-24 RX ADMIN — GLYCOPYRROLATE 0.2 MG: 0.2 INJECTION, SOLUTION INTRAMUSCULAR; INTRAVITREAL at 01:03

## 2021-03-24 RX ADMIN — PROPOFOL 30 MG: 10 INJECTION, EMULSION INTRAVENOUS at 01:03

## 2021-03-24 RX ADMIN — IOHEXOL 10 ML: 300 INJECTION, SOLUTION INTRAVENOUS at 01:03

## 2021-03-24 RX ADMIN — SODIUM CHLORIDE 500 ML: 0.9 INJECTION, SOLUTION INTRAVENOUS at 03:03

## 2021-03-24 RX ADMIN — CALCIUM GLUCONATE 1000 MG: 98 INJECTION, SOLUTION INTRAVENOUS at 05:03

## 2021-03-24 RX ADMIN — MAGNESIUM SULFATE IN WATER 2 G: 40 INJECTION, SOLUTION INTRAVENOUS at 06:03

## 2021-03-24 RX ADMIN — LIDOCAINE HYDROCHLORIDE 100 MG: 20 INJECTION, SOLUTION EPIDURAL; INFILTRATION; INTRACAUDAL at 01:03

## 2021-03-24 RX ADMIN — SODIUM CHLORIDE: 0.9 INJECTION, SOLUTION INTRAVENOUS at 12:03

## 2021-03-24 RX ADMIN — POTASSIUM CHLORIDE 40 MEQ: 1500 TABLET, EXTENDED RELEASE ORAL at 05:03

## 2021-03-25 LAB
ALBUMIN SERPL BCP-MCNC: 2 G/DL (ref 3.5–5.2)
ALP SERPL-CCNC: 173 U/L (ref 55–135)
ALT SERPL W/O P-5'-P-CCNC: 64 U/L (ref 10–44)
ANION GAP SERPL CALC-SCNC: 13 MMOL/L (ref 8–16)
AST SERPL-CCNC: 137 U/L (ref 10–40)
BASOPHILS # BLD AUTO: 0.01 K/UL (ref 0–0.2)
BASOPHILS NFR BLD: 0.3 % (ref 0–1.9)
BILIRUB SERPL-MCNC: 1.2 MG/DL (ref 0.1–1)
BUN SERPL-MCNC: 20 MG/DL (ref 8–23)
CA-I BLDV-SCNC: 0.81 MMOL/L (ref 1.06–1.42)
CALCIUM SERPL-MCNC: 6.2 MG/DL (ref 8.7–10.5)
CALCIUM UR-MCNC: <1 MG/DL (ref 0–15)
CHLORIDE SERPL-SCNC: 93 MMOL/L (ref 95–110)
CHLORIDE UR-SCNC: <20 MMOL/L (ref 25–200)
CO2 SERPL-SCNC: 22 MMOL/L (ref 23–29)
CREAT SERPL-MCNC: 1.6 MG/DL (ref 0.5–1.4)
DIFFERENTIAL METHOD: ABNORMAL
EOSINOPHIL # BLD AUTO: 0.1 K/UL (ref 0–0.5)
EOSINOPHIL NFR BLD: 2.5 % (ref 0–8)
ERYTHROCYTE [DISTWIDTH] IN BLOOD BY AUTOMATED COUNT: 15.5 % (ref 11.5–14.5)
ERYTHROCYTE [DISTWIDTH] IN BLOOD BY AUTOMATED COUNT: 15.7 % (ref 11.5–14.5)
EST. GFR  (AFRICAN AMERICAN): 37.1 ML/MIN/1.73 M^2
EST. GFR  (NON AFRICAN AMERICAN): 32.2 ML/MIN/1.73 M^2
GLUCOSE SERPL-MCNC: 108 MG/DL (ref 70–110)
HCT VFR BLD AUTO: 25.9 % (ref 37–48.5)
HCT VFR BLD AUTO: 27.9 % (ref 37–48.5)
HGB BLD-MCNC: 9 G/DL (ref 12–16)
HGB BLD-MCNC: 9.5 G/DL (ref 12–16)
IMM GRANULOCYTES # BLD AUTO: 0.02 K/UL (ref 0–0.04)
IMM GRANULOCYTES NFR BLD AUTO: 0.5 % (ref 0–0.5)
LIPASE SERPL-CCNC: 42 U/L (ref 4–60)
LYMPHOCYTES # BLD AUTO: 0.7 K/UL (ref 1–4.8)
LYMPHOCYTES NFR BLD: 18.3 % (ref 18–48)
MAGNESIUM SERPL-MCNC: 1.6 MG/DL (ref 1.6–2.6)
MCH RBC QN AUTO: 31.1 PG (ref 27–31)
MCH RBC QN AUTO: 31.8 PG (ref 27–31)
MCHC RBC AUTO-ENTMCNC: 34.1 G/DL (ref 32–36)
MCHC RBC AUTO-ENTMCNC: 34.7 G/DL (ref 32–36)
MCV RBC AUTO: 92 FL (ref 82–98)
MCV RBC AUTO: 92 FL (ref 82–98)
MONOCYTES # BLD AUTO: 0.4 K/UL (ref 0.3–1)
MONOCYTES NFR BLD: 10.1 % (ref 4–15)
NEUTROPHILS # BLD AUTO: 2.5 K/UL (ref 1.8–7.7)
NEUTROPHILS NFR BLD: 68.3 % (ref 38–73)
NRBC BLD-RTO: 0 /100 WBC
OSMOLALITY UR: 167 MOSM/KG (ref 50–1200)
PHOSPHATE SERPL-MCNC: 3.2 MG/DL (ref 2.7–4.5)
PLATELET # BLD AUTO: 112 K/UL (ref 150–350)
PLATELET # BLD AUTO: 135 K/UL (ref 150–350)
PMV BLD AUTO: 10.4 FL (ref 9.2–12.9)
PMV BLD AUTO: 10.7 FL (ref 9.2–12.9)
POTASSIUM SERPL-SCNC: 3.1 MMOL/L (ref 3.5–5.1)
PROT SERPL-MCNC: 5 G/DL (ref 6–8.4)
RBC # BLD AUTO: 2.83 M/UL (ref 4–5.4)
RBC # BLD AUTO: 3.05 M/UL (ref 4–5.4)
SODIUM SERPL-SCNC: 128 MMOL/L (ref 136–145)
SODIUM UR-SCNC: 27 MMOL/L (ref 20–250)
WBC # BLD AUTO: 3.67 K/UL (ref 3.9–12.7)
WBC # BLD AUTO: 3.97 K/UL (ref 3.9–12.7)

## 2021-03-25 PROCEDURE — 83735 ASSAY OF MAGNESIUM: CPT | Performed by: INTERNAL MEDICINE

## 2021-03-25 PROCEDURE — 99233 PR SUBSEQUENT HOSPITAL CARE,LEVL III: ICD-10-PCS | Mod: GC,,, | Performed by: HOSPITALIST

## 2021-03-25 PROCEDURE — 27000221 HC OXYGEN, UP TO 24 HOURS

## 2021-03-25 PROCEDURE — 82436 ASSAY OF URINE CHLORIDE: CPT | Performed by: HOSPITALIST

## 2021-03-25 PROCEDURE — 99233 SBSQ HOSP IP/OBS HIGH 50: CPT | Mod: GC,,, | Performed by: HOSPITALIST

## 2021-03-25 PROCEDURE — 25000003 PHARM REV CODE 250: Performed by: STUDENT IN AN ORGANIZED HEALTH CARE EDUCATION/TRAINING PROGRAM

## 2021-03-25 PROCEDURE — 83935 ASSAY OF URINE OSMOLALITY: CPT | Performed by: HOSPITALIST

## 2021-03-25 PROCEDURE — 85027 COMPLETE CBC AUTOMATED: CPT | Performed by: INTERNAL MEDICINE

## 2021-03-25 PROCEDURE — 20600001 HC STEP DOWN PRIVATE ROOM

## 2021-03-25 PROCEDURE — 94640 AIRWAY INHALATION TREATMENT: CPT

## 2021-03-25 PROCEDURE — 25000242 PHARM REV CODE 250 ALT 637 W/ HCPCS: Performed by: INTERNAL MEDICINE

## 2021-03-25 PROCEDURE — 25000003 PHARM REV CODE 250: Performed by: INTERNAL MEDICINE

## 2021-03-25 PROCEDURE — 80053 COMPREHEN METABOLIC PANEL: CPT | Performed by: INTERNAL MEDICINE

## 2021-03-25 PROCEDURE — 36415 COLL VENOUS BLD VENIPUNCTURE: CPT | Performed by: STUDENT IN AN ORGANIZED HEALTH CARE EDUCATION/TRAINING PROGRAM

## 2021-03-25 PROCEDURE — 63600175 PHARM REV CODE 636 W HCPCS: Performed by: STUDENT IN AN ORGANIZED HEALTH CARE EDUCATION/TRAINING PROGRAM

## 2021-03-25 PROCEDURE — 84100 ASSAY OF PHOSPHORUS: CPT | Performed by: INTERNAL MEDICINE

## 2021-03-25 PROCEDURE — 83690 ASSAY OF LIPASE: CPT | Performed by: STUDENT IN AN ORGANIZED HEALTH CARE EDUCATION/TRAINING PROGRAM

## 2021-03-25 PROCEDURE — 25000003 PHARM REV CODE 250: Performed by: EMERGENCY MEDICINE

## 2021-03-25 PROCEDURE — 84300 ASSAY OF URINE SODIUM: CPT | Performed by: HOSPITALIST

## 2021-03-25 PROCEDURE — 63600175 PHARM REV CODE 636 W HCPCS: Performed by: INTERNAL MEDICINE

## 2021-03-25 PROCEDURE — 82330 ASSAY OF CALCIUM: CPT | Performed by: STUDENT IN AN ORGANIZED HEALTH CARE EDUCATION/TRAINING PROGRAM

## 2021-03-25 PROCEDURE — 82340 ASSAY OF CALCIUM IN URINE: CPT | Performed by: STUDENT IN AN ORGANIZED HEALTH CARE EDUCATION/TRAINING PROGRAM

## 2021-03-25 RX ORDER — CALCIUM CARBONATE 500(1250)
500 TABLET ORAL 2 TIMES DAILY
Status: DISCONTINUED | OUTPATIENT
Start: 2021-03-25 | End: 2021-03-25

## 2021-03-25 RX ORDER — POTASSIUM CHLORIDE 750 MG/1
30 CAPSULE, EXTENDED RELEASE ORAL
Status: COMPLETED | OUTPATIENT
Start: 2021-03-25 | End: 2021-03-25

## 2021-03-25 RX ORDER — CALCIUM CARBONATE 500(1250)
1000 TABLET ORAL 2 TIMES DAILY
Status: DISCONTINUED | OUTPATIENT
Start: 2021-03-25 | End: 2021-03-27 | Stop reason: HOSPADM

## 2021-03-25 RX ORDER — MAGNESIUM SULFATE HEPTAHYDRATE 40 MG/ML
2 INJECTION, SOLUTION INTRAVENOUS ONCE
Status: COMPLETED | OUTPATIENT
Start: 2021-03-25 | End: 2021-03-25

## 2021-03-25 RX ADMIN — MELATONIN TAB 3 MG 6 MG: 3 TAB at 09:03

## 2021-03-25 RX ADMIN — Medication 1000 UNITS: at 08:03

## 2021-03-25 RX ADMIN — CALCIUM 1000 MG: 500 TABLET ORAL at 08:03

## 2021-03-25 RX ADMIN — PANTOPRAZOLE SODIUM 40 MG: 40 TABLET, DELAYED RELEASE ORAL at 03:03

## 2021-03-25 RX ADMIN — TIOTROPIUM BROMIDE INHALATION SPRAY 2 PUFF: 3.12 SPRAY, METERED RESPIRATORY (INHALATION) at 05:03

## 2021-03-25 RX ADMIN — ATORVASTATIN CALCIUM 40 MG: 20 TABLET, FILM COATED ORAL at 09:03

## 2021-03-25 RX ADMIN — FLUTICASONE FUROATE AND VILANTEROL TRIFENATATE 1 PUFF: 100; 25 POWDER RESPIRATORY (INHALATION) at 05:03

## 2021-03-25 RX ADMIN — POTASSIUM CHLORIDE 30 MEQ: 10 CAPSULE, COATED, EXTENDED RELEASE ORAL at 12:03

## 2021-03-25 RX ADMIN — CALCIUM 1000 MG: 500 TABLET ORAL at 09:03

## 2021-03-25 RX ADMIN — MAGNESIUM SULFATE IN WATER 2 G: 40 INJECTION, SOLUTION INTRAVENOUS at 08:03

## 2021-03-25 RX ADMIN — MORPHINE SULFATE 3 MG: 2 INJECTION, SOLUTION INTRAMUSCULAR; INTRAVENOUS at 02:03

## 2021-03-25 RX ADMIN — SODIUM CHLORIDE 250 ML: 0.9 INJECTION, SOLUTION INTRAVENOUS at 03:03

## 2021-03-25 RX ADMIN — POTASSIUM CHLORIDE 30 MEQ: 10 CAPSULE, COATED, EXTENDED RELEASE ORAL at 02:03

## 2021-03-25 RX ADMIN — POTASSIUM CHLORIDE 30 MEQ: 10 CAPSULE, COATED, EXTENDED RELEASE ORAL at 11:03

## 2021-03-25 RX ADMIN — ONDANSETRON 4 MG: 2 INJECTION INTRAMUSCULAR; INTRAVENOUS at 02:03

## 2021-03-26 DIAGNOSIS — K83.1 BILIARY STRICTURE: Primary | ICD-10-CM

## 2021-03-26 LAB
ALBUMIN SERPL BCP-MCNC: 2 G/DL (ref 3.5–5.2)
ALP SERPL-CCNC: 229 U/L (ref 55–135)
ALT SERPL W/O P-5'-P-CCNC: 98 U/L (ref 10–44)
ANION GAP SERPL CALC-SCNC: 11 MMOL/L (ref 8–16)
AST SERPL-CCNC: 257 U/L (ref 10–40)
BILIRUB SERPL-MCNC: 1.3 MG/DL (ref 0.1–1)
BUN SERPL-MCNC: 17 MG/DL (ref 8–23)
CALCIUM SERPL-MCNC: 6.6 MG/DL (ref 8.7–10.5)
CHLORIDE SERPL-SCNC: 95 MMOL/L (ref 95–110)
CO2 SERPL-SCNC: 23 MMOL/L (ref 23–29)
CREAT SERPL-MCNC: 1.4 MG/DL (ref 0.5–1.4)
ERYTHROCYTE [DISTWIDTH] IN BLOOD BY AUTOMATED COUNT: 15.8 % (ref 11.5–14.5)
EST. GFR  (AFRICAN AMERICAN): 43.6 ML/MIN/1.73 M^2
EST. GFR  (NON AFRICAN AMERICAN): 37.8 ML/MIN/1.73 M^2
GLUCOSE SERPL-MCNC: 117 MG/DL (ref 70–110)
HCT VFR BLD AUTO: 26.4 % (ref 37–48.5)
HGB BLD-MCNC: 8.9 G/DL (ref 12–16)
LIPASE SERPL-CCNC: 66 U/L (ref 4–60)
MAGNESIUM SERPL-MCNC: 1.9 MG/DL (ref 1.6–2.6)
MCH RBC QN AUTO: 31.1 PG (ref 27–31)
MCHC RBC AUTO-ENTMCNC: 33.7 G/DL (ref 32–36)
MCV RBC AUTO: 92 FL (ref 82–98)
PHOSPHATE SERPL-MCNC: 2.7 MG/DL (ref 2.7–4.5)
PLATELET # BLD AUTO: 122 K/UL (ref 150–350)
PMV BLD AUTO: 10.3 FL (ref 9.2–12.9)
POTASSIUM SERPL-SCNC: 3.8 MMOL/L (ref 3.5–5.1)
PROT SERPL-MCNC: 5 G/DL (ref 6–8.4)
RBC # BLD AUTO: 2.86 M/UL (ref 4–5.4)
SODIUM SERPL-SCNC: 129 MMOL/L (ref 136–145)
WBC # BLD AUTO: 3.19 K/UL (ref 3.9–12.7)

## 2021-03-26 PROCEDURE — 36415 COLL VENOUS BLD VENIPUNCTURE: CPT | Performed by: STUDENT IN AN ORGANIZED HEALTH CARE EDUCATION/TRAINING PROGRAM

## 2021-03-26 PROCEDURE — 99232 SBSQ HOSP IP/OBS MODERATE 35: CPT | Mod: GC,,, | Performed by: HOSPITALIST

## 2021-03-26 PROCEDURE — 99232 PR SUBSEQUENT HOSPITAL CARE,LEVL II: ICD-10-PCS | Mod: GC,,, | Performed by: HOSPITALIST

## 2021-03-26 PROCEDURE — 85027 COMPLETE CBC AUTOMATED: CPT | Performed by: INTERNAL MEDICINE

## 2021-03-26 PROCEDURE — 25000242 PHARM REV CODE 250 ALT 637 W/ HCPCS: Performed by: INTERNAL MEDICINE

## 2021-03-26 PROCEDURE — 83735 ASSAY OF MAGNESIUM: CPT | Performed by: INTERNAL MEDICINE

## 2021-03-26 PROCEDURE — 94761 N-INVAS EAR/PLS OXIMETRY MLT: CPT

## 2021-03-26 PROCEDURE — 83690 ASSAY OF LIPASE: CPT | Performed by: STUDENT IN AN ORGANIZED HEALTH CARE EDUCATION/TRAINING PROGRAM

## 2021-03-26 PROCEDURE — 25000003 PHARM REV CODE 250: Performed by: INTERNAL MEDICINE

## 2021-03-26 PROCEDURE — 80053 COMPREHEN METABOLIC PANEL: CPT | Performed by: INTERNAL MEDICINE

## 2021-03-26 PROCEDURE — 84100 ASSAY OF PHOSPHORUS: CPT | Performed by: INTERNAL MEDICINE

## 2021-03-26 PROCEDURE — 36415 COLL VENOUS BLD VENIPUNCTURE: CPT | Performed by: INTERNAL MEDICINE

## 2021-03-26 PROCEDURE — 25000003 PHARM REV CODE 250: Performed by: EMERGENCY MEDICINE

## 2021-03-26 PROCEDURE — 94640 AIRWAY INHALATION TREATMENT: CPT

## 2021-03-26 PROCEDURE — 99900035 HC TECH TIME PER 15 MIN (STAT)

## 2021-03-26 PROCEDURE — 82652 VIT D 1 25-DIHYDROXY: CPT | Performed by: HOSPITALIST

## 2021-03-26 PROCEDURE — 25000003 PHARM REV CODE 250: Performed by: STUDENT IN AN ORGANIZED HEALTH CARE EDUCATION/TRAINING PROGRAM

## 2021-03-26 PROCEDURE — 20600001 HC STEP DOWN PRIVATE ROOM

## 2021-03-26 PROCEDURE — 27000221 HC OXYGEN, UP TO 24 HOURS

## 2021-03-26 RX ADMIN — PANTOPRAZOLE SODIUM 40 MG: 40 TABLET, DELAYED RELEASE ORAL at 06:03

## 2021-03-26 RX ADMIN — TIOTROPIUM BROMIDE INHALATION SPRAY 2 PUFF: 3.12 SPRAY, METERED RESPIRATORY (INHALATION) at 10:03

## 2021-03-26 RX ADMIN — ACETAMINOPHEN 650 MG: 325 TABLET ORAL at 08:03

## 2021-03-26 RX ADMIN — CALCIUM 1000 MG: 500 TABLET ORAL at 08:03

## 2021-03-26 RX ADMIN — Medication 1000 UNITS: at 09:03

## 2021-03-26 RX ADMIN — MELATONIN TAB 3 MG 6 MG: 3 TAB at 08:03

## 2021-03-26 RX ADMIN — FLUTICASONE FUROATE AND VILANTEROL TRIFENATATE 1 PUFF: 100; 25 POWDER RESPIRATORY (INHALATION) at 10:03

## 2021-03-26 RX ADMIN — CALCIUM 1000 MG: 500 TABLET ORAL at 09:03

## 2021-03-26 RX ADMIN — ATORVASTATIN CALCIUM 40 MG: 20 TABLET, FILM COATED ORAL at 08:03

## 2021-03-27 VITALS
RESPIRATION RATE: 20 BRPM | SYSTOLIC BLOOD PRESSURE: 138 MMHG | WEIGHT: 228.63 LBS | HEART RATE: 88 BPM | HEIGHT: 66 IN | DIASTOLIC BLOOD PRESSURE: 86 MMHG | TEMPERATURE: 98 F | BODY MASS INDEX: 36.74 KG/M2 | OXYGEN SATURATION: 97 %

## 2021-03-27 LAB
ALBUMIN SERPL BCP-MCNC: 2 G/DL (ref 3.5–5.2)
ALP SERPL-CCNC: 222 U/L (ref 55–135)
ALT SERPL W/O P-5'-P-CCNC: 73 U/L (ref 10–44)
ANION GAP SERPL CALC-SCNC: 9 MMOL/L (ref 8–16)
AST SERPL-CCNC: 124 U/L (ref 10–40)
BACTERIA BLD CULT: NORMAL
BACTERIA BLD CULT: NORMAL
BILIRUB SERPL-MCNC: 0.9 MG/DL (ref 0.1–1)
BUN SERPL-MCNC: 13 MG/DL (ref 8–23)
CALCIUM SERPL-MCNC: 6.7 MG/DL (ref 8.7–10.5)
CHLORIDE SERPL-SCNC: 98 MMOL/L (ref 95–110)
CO2 SERPL-SCNC: 23 MMOL/L (ref 23–29)
CREAT SERPL-MCNC: 0.9 MG/DL (ref 0.5–1.4)
ERYTHROCYTE [DISTWIDTH] IN BLOOD BY AUTOMATED COUNT: 16.3 % (ref 11.5–14.5)
EST. GFR  (AFRICAN AMERICAN): >60 ML/MIN/1.73 M^2
EST. GFR  (NON AFRICAN AMERICAN): >60 ML/MIN/1.73 M^2
GLUCOSE SERPL-MCNC: 98 MG/DL (ref 70–110)
HCT VFR BLD AUTO: 24.9 % (ref 37–48.5)
HGB BLD-MCNC: 8.6 G/DL (ref 12–16)
MAGNESIUM SERPL-MCNC: 1.7 MG/DL (ref 1.6–2.6)
MCH RBC QN AUTO: 31 PG (ref 27–31)
MCHC RBC AUTO-ENTMCNC: 34.5 G/DL (ref 32–36)
MCV RBC AUTO: 90 FL (ref 82–98)
PHOSPHATE SERPL-MCNC: 2.9 MG/DL (ref 2.7–4.5)
PLATELET # BLD AUTO: 133 K/UL (ref 150–350)
PMV BLD AUTO: 11.1 FL (ref 9.2–12.9)
POTASSIUM SERPL-SCNC: 3.8 MMOL/L (ref 3.5–5.1)
PROT SERPL-MCNC: 5 G/DL (ref 6–8.4)
RBC # BLD AUTO: 2.77 M/UL (ref 4–5.4)
SODIUM SERPL-SCNC: 130 MMOL/L (ref 136–145)
WBC # BLD AUTO: 3.23 K/UL (ref 3.9–12.7)

## 2021-03-27 PROCEDURE — 25000003 PHARM REV CODE 250: Performed by: INTERNAL MEDICINE

## 2021-03-27 PROCEDURE — 80053 COMPREHEN METABOLIC PANEL: CPT | Performed by: INTERNAL MEDICINE

## 2021-03-27 PROCEDURE — 63600175 PHARM REV CODE 636 W HCPCS: Performed by: STUDENT IN AN ORGANIZED HEALTH CARE EDUCATION/TRAINING PROGRAM

## 2021-03-27 PROCEDURE — 84100 ASSAY OF PHOSPHORUS: CPT | Performed by: INTERNAL MEDICINE

## 2021-03-27 PROCEDURE — 85027 COMPLETE CBC AUTOMATED: CPT | Performed by: INTERNAL MEDICINE

## 2021-03-27 PROCEDURE — 99238 PR HOSPITAL DISCHARGE DAY,<30 MIN: ICD-10-PCS | Mod: GC,,, | Performed by: HOSPITALIST

## 2021-03-27 PROCEDURE — 99238 HOSP IP/OBS DSCHRG MGMT 30/<: CPT | Mod: GC,,, | Performed by: HOSPITALIST

## 2021-03-27 PROCEDURE — 25000003 PHARM REV CODE 250: Performed by: STUDENT IN AN ORGANIZED HEALTH CARE EDUCATION/TRAINING PROGRAM

## 2021-03-27 PROCEDURE — 83735 ASSAY OF MAGNESIUM: CPT | Performed by: INTERNAL MEDICINE

## 2021-03-27 PROCEDURE — 36415 COLL VENOUS BLD VENIPUNCTURE: CPT | Performed by: INTERNAL MEDICINE

## 2021-03-27 RX ORDER — MAGNESIUM SULFATE HEPTAHYDRATE 40 MG/ML
2 INJECTION, SOLUTION INTRAVENOUS ONCE
Status: DISCONTINUED | OUTPATIENT
Start: 2021-03-27 | End: 2021-03-27 | Stop reason: HOSPADM

## 2021-03-27 RX ORDER — CALCIUM CARBONATE 500(1250)
1000 TABLET ORAL 2 TIMES DAILY
Refills: 0 | Status: ON HOLD | COMMUNITY
Start: 2021-03-27 | End: 2021-09-07 | Stop reason: SDUPTHER

## 2021-03-27 RX ORDER — LOSARTAN POTASSIUM 100 MG/1
100 TABLET ORAL DAILY
Qty: 90 TABLET | Refills: 3 | Status: ON HOLD | OUTPATIENT
Start: 2021-03-27 | End: 2021-05-06 | Stop reason: SDUPTHER

## 2021-03-27 RX ADMIN — CALCIUM 1000 MG: 500 TABLET ORAL at 09:03

## 2021-03-27 RX ADMIN — TIOTROPIUM BROMIDE INHALATION SPRAY 2 PUFF: 3.12 SPRAY, METERED RESPIRATORY (INHALATION) at 12:03

## 2021-03-27 RX ADMIN — FLUTICASONE FUROATE AND VILANTEROL TRIFENATATE 1 PUFF: 100; 25 POWDER RESPIRATORY (INHALATION) at 12:03

## 2021-03-27 RX ADMIN — Medication 1000 UNITS: at 09:03

## 2021-03-27 RX ADMIN — PANTOPRAZOLE SODIUM 40 MG: 40 TABLET, DELAYED RELEASE ORAL at 04:03

## 2021-03-27 RX ADMIN — PANTOPRAZOLE SODIUM 40 MG: 40 TABLET, DELAYED RELEASE ORAL at 05:03

## 2021-03-29 ENCOUNTER — TELEPHONE (OUTPATIENT)
Dept: SMOKING CESSATION | Facility: CLINIC | Age: 71
End: 2021-03-29

## 2021-03-29 LAB — 1,25(OH)2D3 SERPL-MCNC: 120 PG/ML (ref 20–79)

## 2021-04-01 ENCOUNTER — TELEPHONE (OUTPATIENT)
Dept: ENDOSCOPY | Facility: HOSPITAL | Age: 71
End: 2021-04-01

## 2021-04-05 ENCOUNTER — PATIENT MESSAGE (OUTPATIENT)
Dept: INTERNAL MEDICINE | Facility: CLINIC | Age: 71
End: 2021-04-05

## 2021-04-05 DIAGNOSIS — G89.4 CHRONIC PAIN DISORDER: ICD-10-CM

## 2021-04-05 RX ORDER — PANTOPRAZOLE SODIUM 40 MG/1
40 TABLET, DELAYED RELEASE ORAL DAILY
Qty: 30 TABLET | Refills: 0 | Status: SHIPPED | OUTPATIENT
Start: 2021-04-05 | End: 2021-07-06 | Stop reason: SDUPTHER

## 2021-04-05 RX ORDER — HYDROCODONE BITARTRATE AND ACETAMINOPHEN 10; 325 MG/1; MG/1
1 TABLET ORAL EVERY 12 HOURS
Qty: 60 TABLET | Refills: 0 | Status: ON HOLD | OUTPATIENT
Start: 2021-04-05 | End: 2021-05-07 | Stop reason: SDUPTHER

## 2021-04-29 ENCOUNTER — HOSPITAL ENCOUNTER (INPATIENT)
Facility: HOSPITAL | Age: 71
LOS: 11 days | Discharge: NURSING, REHAB OR LONG-TERM CARE | DRG: 445 | End: 2021-05-10
Attending: EMERGENCY MEDICINE | Admitting: STUDENT IN AN ORGANIZED HEALTH CARE EDUCATION/TRAINING PROGRAM
Payer: MEDICARE

## 2021-04-29 DIAGNOSIS — N30.00 ACUTE CYSTITIS WITHOUT HEMATURIA: ICD-10-CM

## 2021-04-29 DIAGNOSIS — M48.02 CERVICAL STENOSIS OF SPINAL CANAL: ICD-10-CM

## 2021-04-29 DIAGNOSIS — F03.90 DEMENTIA: ICD-10-CM

## 2021-04-29 DIAGNOSIS — R53.1 WEAKNESS: ICD-10-CM

## 2021-04-29 DIAGNOSIS — J44.9 CHRONIC OBSTRUCTIVE PULMONARY DISEASE, UNSPECIFIED COPD TYPE: ICD-10-CM

## 2021-04-29 DIAGNOSIS — Z78.9 IMPAIRED MOBILITY AND ADLS: ICD-10-CM

## 2021-04-29 DIAGNOSIS — R07.9 CHEST PAIN: ICD-10-CM

## 2021-04-29 DIAGNOSIS — R06.02 SOB (SHORTNESS OF BREATH): ICD-10-CM

## 2021-04-29 DIAGNOSIS — Z01.810 PREOP CARDIOVASCULAR EXAM: ICD-10-CM

## 2021-04-29 DIAGNOSIS — R33.9 URINARY RETENTION: ICD-10-CM

## 2021-04-29 DIAGNOSIS — C34.11 PRIMARY MALIGNANT NEOPLASM OF RIGHT UPPER LOBE OF LUNG: ICD-10-CM

## 2021-04-29 DIAGNOSIS — K80.50 CHOLEDOCHOLITHIASIS: ICD-10-CM

## 2021-04-29 DIAGNOSIS — I25.10 CORONARY ARTERY DISEASE: ICD-10-CM

## 2021-04-29 DIAGNOSIS — E83.51 HYPOCALCEMIA: Primary | ICD-10-CM

## 2021-04-29 DIAGNOSIS — G89.4 CHRONIC PAIN DISORDER: ICD-10-CM

## 2021-04-29 DIAGNOSIS — Z74.09 IMPAIRED MOBILITY AND ADLS: ICD-10-CM

## 2021-04-29 LAB
ALBUMIN SERPL BCP-MCNC: 2.1 G/DL (ref 3.5–5.2)
ALP SERPL-CCNC: 360 U/L (ref 55–135)
ALT SERPL W/O P-5'-P-CCNC: 84 U/L (ref 10–44)
AMMONIA PLAS-SCNC: 51 UMOL/L (ref 10–50)
ANION GAP SERPL CALC-SCNC: 26 MMOL/L (ref 8–16)
APTT BLDCRRT: 28 SEC (ref 21–32)
AST SERPL-CCNC: 246 U/L (ref 10–40)
BACTERIA #/AREA URNS HPF: ABNORMAL /HPF
BASOPHILS # BLD AUTO: 0.04 K/UL (ref 0–0.2)
BASOPHILS NFR BLD: 0.7 % (ref 0–1.9)
BILIRUB DIRECT SERPL-MCNC: 2.3 MG/DL (ref 0.1–0.3)
BILIRUB SERPL-MCNC: 2.8 MG/DL (ref 0.1–1)
BILIRUB UR QL STRIP: ABNORMAL
BNP SERPL-MCNC: 109 PG/ML (ref 0–99)
BUN SERPL-MCNC: 33 MG/DL (ref 8–23)
CA-I BLDV-SCNC: 0.59 MMOL/L (ref 1.06–1.42)
CALCIUM SERPL-MCNC: 5.4 MG/DL (ref 8.7–10.5)
CHLORIDE SERPL-SCNC: 84 MMOL/L (ref 95–110)
CLARITY UR: CLEAR
CO2 SERPL-SCNC: 17 MMOL/L (ref 23–29)
COLOR UR: YELLOW
CREAT SERPL-MCNC: 1.3 MG/DL (ref 0.5–1.4)
CTP QC/QA: YES
DIFFERENTIAL METHOD: ABNORMAL
EOSINOPHIL # BLD AUTO: 0.1 K/UL (ref 0–0.5)
EOSINOPHIL NFR BLD: 2.3 % (ref 0–8)
ERYTHROCYTE [DISTWIDTH] IN BLOOD BY AUTOMATED COUNT: 16.8 % (ref 11.5–14.5)
EST. GFR  (AFRICAN AMERICAN): 48 ML/MIN/1.73 M^2
EST. GFR  (NON AFRICAN AMERICAN): 41 ML/MIN/1.73 M^2
GLUCOSE SERPL-MCNC: 72 MG/DL (ref 70–110)
GLUCOSE UR QL STRIP: NEGATIVE
HCT VFR BLD AUTO: 30.6 % (ref 37–48.5)
HGB BLD-MCNC: 10.6 G/DL (ref 12–16)
HGB UR QL STRIP: ABNORMAL
HYALINE CASTS #/AREA URNS LPF: 2 /LPF
IMM GRANULOCYTES # BLD AUTO: 0.06 K/UL (ref 0–0.04)
IMM GRANULOCYTES NFR BLD AUTO: 1 % (ref 0–0.5)
INR PPP: 1.1 (ref 0.8–1.2)
KETONES UR QL STRIP: ABNORMAL
LACTATE SERPL-SCNC: 0.6 MMOL/L (ref 0.5–2.2)
LEUKOCYTE ESTERASE UR QL STRIP: ABNORMAL
LYMPHOCYTES # BLD AUTO: 1.2 K/UL (ref 1–4.8)
LYMPHOCYTES NFR BLD: 19.8 % (ref 18–48)
MCH RBC QN AUTO: 30.2 PG (ref 27–31)
MCHC RBC AUTO-ENTMCNC: 34.6 G/DL (ref 32–36)
MCV RBC AUTO: 87 FL (ref 82–98)
MICROSCOPIC COMMENT: ABNORMAL
MONOCYTES # BLD AUTO: 0.5 K/UL (ref 0.3–1)
MONOCYTES NFR BLD: 7.7 % (ref 4–15)
NEUTROPHILS # BLD AUTO: 4.1 K/UL (ref 1.8–7.7)
NEUTROPHILS NFR BLD: 68.5 % (ref 38–73)
NITRITE UR QL STRIP: POSITIVE
NRBC BLD-RTO: 0 /100 WBC
PH UR STRIP: 6 [PH] (ref 5–8)
PLATELET # BLD AUTO: 239 K/UL (ref 150–450)
PMV BLD AUTO: 11.1 FL (ref 9.2–12.9)
POTASSIUM SERPL-SCNC: 3.3 MMOL/L (ref 3.5–5.1)
PROT SERPL-MCNC: 6.6 G/DL (ref 6–8.4)
PROT UR QL STRIP: ABNORMAL
PROTHROMBIN TIME: 12.4 SEC (ref 9–12.5)
RBC # BLD AUTO: 3.51 M/UL (ref 4–5.4)
RBC #/AREA URNS HPF: 100 /HPF (ref 0–4)
SARS-COV-2 RDRP RESP QL NAA+PROBE: NEGATIVE
SODIUM SERPL-SCNC: 127 MMOL/L (ref 136–145)
SP GR UR STRIP: 1.02 (ref 1–1.03)
TROPONIN I SERPL DL<=0.01 NG/ML-MCNC: 0.04 NG/ML (ref 0–0.03)
URN SPEC COLLECT METH UR: ABNORMAL
UROBILINOGEN UR STRIP-ACNC: ABNORMAL EU/DL
WBC # BLD AUTO: 6 K/UL (ref 3.9–12.7)
WBC #/AREA URNS HPF: 100 /HPF (ref 0–5)

## 2021-04-29 PROCEDURE — 93010 ELECTROCARDIOGRAM REPORT: CPT | Mod: ,,, | Performed by: INTERNAL MEDICINE

## 2021-04-29 PROCEDURE — 63600175 PHARM REV CODE 636 W HCPCS: Performed by: EMERGENCY MEDICINE

## 2021-04-29 PROCEDURE — U0002 COVID-19 LAB TEST NON-CDC: HCPCS | Performed by: EMERGENCY MEDICINE

## 2021-04-29 PROCEDURE — 82248 BILIRUBIN DIRECT: CPT | Performed by: EMERGENCY MEDICINE

## 2021-04-29 PROCEDURE — 96365 THER/PROPH/DIAG IV INF INIT: CPT

## 2021-04-29 PROCEDURE — 96367 TX/PROPH/DG ADDL SEQ IV INF: CPT

## 2021-04-29 PROCEDURE — 85610 PROTHROMBIN TIME: CPT | Performed by: EMERGENCY MEDICINE

## 2021-04-29 PROCEDURE — 83880 ASSAY OF NATRIURETIC PEPTIDE: CPT | Performed by: EMERGENCY MEDICINE

## 2021-04-29 PROCEDURE — 85730 THROMBOPLASTIN TIME PARTIAL: CPT | Performed by: EMERGENCY MEDICINE

## 2021-04-29 PROCEDURE — 83605 ASSAY OF LACTIC ACID: CPT | Performed by: EMERGENCY MEDICINE

## 2021-04-29 PROCEDURE — 80053 COMPREHEN METABOLIC PANEL: CPT | Performed by: EMERGENCY MEDICINE

## 2021-04-29 PROCEDURE — 25000003 PHARM REV CODE 250: Performed by: EMERGENCY MEDICINE

## 2021-04-29 PROCEDURE — 93005 ELECTROCARDIOGRAM TRACING: CPT

## 2021-04-29 PROCEDURE — 87040 BLOOD CULTURE FOR BACTERIA: CPT | Performed by: EMERGENCY MEDICINE

## 2021-04-29 PROCEDURE — 99285 EMERGENCY DEPT VISIT HI MDM: CPT | Mod: 25

## 2021-04-29 PROCEDURE — 87088 URINE BACTERIA CULTURE: CPT | Performed by: EMERGENCY MEDICINE

## 2021-04-29 PROCEDURE — 93010 EKG 12-LEAD: ICD-10-PCS | Mod: ,,, | Performed by: INTERNAL MEDICINE

## 2021-04-29 PROCEDURE — 82330 ASSAY OF CALCIUM: CPT | Performed by: EMERGENCY MEDICINE

## 2021-04-29 PROCEDURE — 96361 HYDRATE IV INFUSION ADD-ON: CPT

## 2021-04-29 PROCEDURE — 87086 URINE CULTURE/COLONY COUNT: CPT | Performed by: EMERGENCY MEDICINE

## 2021-04-29 PROCEDURE — 82140 ASSAY OF AMMONIA: CPT | Performed by: EMERGENCY MEDICINE

## 2021-04-29 PROCEDURE — 84484 ASSAY OF TROPONIN QUANT: CPT | Performed by: EMERGENCY MEDICINE

## 2021-04-29 PROCEDURE — 87186 SC STD MICRODIL/AGAR DIL: CPT | Performed by: EMERGENCY MEDICINE

## 2021-04-29 PROCEDURE — 85025 COMPLETE CBC W/AUTO DIFF WBC: CPT | Performed by: EMERGENCY MEDICINE

## 2021-04-29 PROCEDURE — 81000 URINALYSIS NONAUTO W/SCOPE: CPT | Performed by: EMERGENCY MEDICINE

## 2021-04-29 PROCEDURE — 12000002 HC ACUTE/MED SURGE SEMI-PRIVATE ROOM

## 2021-04-29 PROCEDURE — 87077 CULTURE AEROBIC IDENTIFY: CPT | Performed by: EMERGENCY MEDICINE

## 2021-04-29 RX ADMIN — CALCIUM GLUCONATE 1 G: 98 INJECTION, SOLUTION INTRAVENOUS at 08:04

## 2021-04-29 RX ADMIN — CEFTRIAXONE 1 G: 1 INJECTION, SOLUTION INTRAVENOUS at 07:04

## 2021-04-29 RX ADMIN — SODIUM CHLORIDE 500 ML: 0.9 INJECTION, SOLUTION INTRAVENOUS at 06:04

## 2021-04-30 ENCOUNTER — ANESTHESIA EVENT (OUTPATIENT)
Dept: ENDOSCOPY | Facility: HOSPITAL | Age: 71
DRG: 445 | End: 2021-04-30
Payer: MEDICARE

## 2021-04-30 ENCOUNTER — ANESTHESIA (OUTPATIENT)
Dept: ENDOSCOPY | Facility: HOSPITAL | Age: 71
DRG: 445 | End: 2021-04-30
Payer: MEDICARE

## 2021-04-30 PROBLEM — R53.1 WEAKNESS: Status: ACTIVE | Noted: 2021-04-30

## 2021-04-30 PROBLEM — E83.39 HYPOPHOSPHATEMIA: Status: ACTIVE | Noted: 2021-04-30

## 2021-04-30 PROBLEM — N39.0 UTI (URINARY TRACT INFECTION): Status: ACTIVE | Noted: 2021-04-30

## 2021-04-30 PROBLEM — R33.9 URINARY RETENTION: Status: ACTIVE | Noted: 2021-04-30

## 2021-04-30 PROBLEM — E87.29 HIGH ANION GAP METABOLIC ACIDOSIS: Status: ACTIVE | Noted: 2021-04-30

## 2021-04-30 LAB
ABO + RH BLD: NORMAL
ALBUMIN SERPL BCP-MCNC: 1.7 G/DL (ref 3.5–5.2)
ALBUMIN SERPL BCP-MCNC: 2.1 G/DL (ref 3.5–5.2)
ALP SERPL-CCNC: 308 U/L (ref 55–135)
ALP SERPL-CCNC: 346 U/L (ref 55–135)
ALT SERPL W/O P-5'-P-CCNC: 68 U/L (ref 10–44)
ALT SERPL W/O P-5'-P-CCNC: 71 U/L (ref 10–44)
ANION GAP SERPL CALC-SCNC: 19 MMOL/L (ref 8–16)
ANION GAP SERPL CALC-SCNC: 22 MMOL/L (ref 8–16)
AST SERPL-CCNC: 193 U/L (ref 10–40)
AST SERPL-CCNC: 210 U/L (ref 10–40)
B-OH-BUTYR BLD STRIP-SCNC: 3.8 MMOL/L (ref 0–0.5)
BASOPHILS # BLD AUTO: 0.01 K/UL (ref 0–0.2)
BASOPHILS NFR BLD: 0.2 % (ref 0–1.9)
BILIRUB SERPL-MCNC: 1.9 MG/DL (ref 0.1–1)
BILIRUB SERPL-MCNC: 2.4 MG/DL (ref 0.1–1)
BLD GP AB SCN CELLS X3 SERPL QL: NORMAL
BUN SERPL-MCNC: 32 MG/DL (ref 8–23)
BUN SERPL-MCNC: 33 MG/DL (ref 8–23)
CA-I BLDV-SCNC: 0.61 MMOL/L (ref 1.06–1.42)
CALCIUM SERPL-MCNC: 5.2 MG/DL (ref 8.7–10.5)
CALCIUM SERPL-MCNC: 5.9 MG/DL (ref 8.7–10.5)
CHLORIDE SERPL-SCNC: 86 MMOL/L (ref 95–110)
CHLORIDE SERPL-SCNC: 87 MMOL/L (ref 95–110)
CO2 SERPL-SCNC: 20 MMOL/L (ref 23–29)
CO2 SERPL-SCNC: 21 MMOL/L (ref 23–29)
CREAT SERPL-MCNC: 1 MG/DL (ref 0.5–1.4)
CREAT SERPL-MCNC: 1.1 MG/DL (ref 0.5–1.4)
DIFFERENTIAL METHOD: ABNORMAL
EOSINOPHIL # BLD AUTO: 0 K/UL (ref 0–0.5)
EOSINOPHIL NFR BLD: 0.3 % (ref 0–8)
ERYTHROCYTE [DISTWIDTH] IN BLOOD BY AUTOMATED COUNT: 16.3 % (ref 11.5–14.5)
EST. GFR  (AFRICAN AMERICAN): 58.4 ML/MIN/1.73 M^2
EST. GFR  (AFRICAN AMERICAN): >60 ML/MIN/1.73 M^2
EST. GFR  (NON AFRICAN AMERICAN): 50.6 ML/MIN/1.73 M^2
EST. GFR  (NON AFRICAN AMERICAN): 57 ML/MIN/1.73 M^2
GLUCOSE SERPL-MCNC: 77 MG/DL (ref 70–110)
GLUCOSE SERPL-MCNC: 78 MG/DL (ref 70–110)
HCT VFR BLD AUTO: 26.7 % (ref 37–48.5)
HGB BLD-MCNC: 9.5 G/DL (ref 12–16)
IMM GRANULOCYTES # BLD AUTO: 0.04 K/UL (ref 0–0.04)
IMM GRANULOCYTES NFR BLD AUTO: 0.6 % (ref 0–0.5)
INR PPP: 1.1 (ref 0.8–1.2)
IRON SERPL-MCNC: 40 UG/DL (ref 30–160)
LYMPHOCYTES # BLD AUTO: 0.7 K/UL (ref 1–4.8)
LYMPHOCYTES NFR BLD: 10.6 % (ref 18–48)
MAGNESIUM SERPL-MCNC: 1.1 MG/DL (ref 1.6–2.6)
MAGNESIUM SERPL-MCNC: 1.2 MG/DL (ref 1.6–2.6)
MCH RBC QN AUTO: 30 PG (ref 27–31)
MCHC RBC AUTO-ENTMCNC: 35.6 G/DL (ref 32–36)
MCV RBC AUTO: 84 FL (ref 82–98)
MONOCYTES # BLD AUTO: 0.4 K/UL (ref 0.3–1)
MONOCYTES NFR BLD: 6.8 % (ref 4–15)
NEUTROPHILS # BLD AUTO: 5.2 K/UL (ref 1.8–7.7)
NEUTROPHILS NFR BLD: 81.5 % (ref 38–73)
NRBC BLD-RTO: 0 /100 WBC
OSMOLALITY SERPL: 279 MOSM/KG (ref 275–295)
PHOSPHATE SERPL-MCNC: 1.9 MG/DL (ref 2.7–4.5)
PLATELET # BLD AUTO: 170 K/UL (ref 150–450)
PMV BLD AUTO: 10.6 FL (ref 9.2–12.9)
POTASSIUM SERPL-SCNC: 3 MMOL/L (ref 3.5–5.1)
POTASSIUM SERPL-SCNC: 3 MMOL/L (ref 3.5–5.1)
PROT SERPL-MCNC: 5.8 G/DL (ref 6–8.4)
PROT SERPL-MCNC: 6.1 G/DL (ref 6–8.4)
PROTHROMBIN TIME: 12 SEC (ref 9–12.5)
RBC # BLD AUTO: 3.17 M/UL (ref 4–5.4)
SATURATED IRON: 43 % (ref 20–50)
SODIUM SERPL-SCNC: 126 MMOL/L (ref 136–145)
SODIUM SERPL-SCNC: 129 MMOL/L (ref 136–145)
SODIUM UR-SCNC: <20 MMOL/L (ref 20–250)
TOTAL IRON BINDING CAPACITY: 93 UG/DL (ref 250–450)
TRANSFERRIN SERPL-MCNC: 63 MG/DL (ref 200–375)
TSH SERPL DL<=0.005 MIU/L-ACNC: 1.75 UIU/ML (ref 0.4–4)
VIT B12 SERPL-MCNC: >2000 PG/ML (ref 210–950)
WBC # BLD AUTO: 6.43 K/UL (ref 3.9–12.7)

## 2021-04-30 PROCEDURE — 25000003 PHARM REV CODE 250: Performed by: STUDENT IN AN ORGANIZED HEALTH CARE EDUCATION/TRAINING PROGRAM

## 2021-04-30 PROCEDURE — 74328 X-RAY BILE DUCT ENDOSCOPY: CPT | Mod: 26,,, | Performed by: INTERNAL MEDICINE

## 2021-04-30 PROCEDURE — 94640 AIRWAY INHALATION TREATMENT: CPT

## 2021-04-30 PROCEDURE — 85025 COMPLETE CBC W/AUTO DIFF WBC: CPT | Performed by: HOSPITALIST

## 2021-04-30 PROCEDURE — 43276 ERCP STENT EXCHANGE W/DILATE: CPT | Performed by: INTERNAL MEDICINE

## 2021-04-30 PROCEDURE — 80053 COMPREHEN METABOLIC PANEL: CPT | Mod: 91 | Performed by: HOSPITALIST

## 2021-04-30 PROCEDURE — 84100 ASSAY OF PHOSPHORUS: CPT | Performed by: HOSPITALIST

## 2021-04-30 PROCEDURE — 63600175 PHARM REV CODE 636 W HCPCS: Performed by: STUDENT IN AN ORGANIZED HEALTH CARE EDUCATION/TRAINING PROGRAM

## 2021-04-30 PROCEDURE — 86900 BLOOD TYPING SEROLOGIC ABO: CPT | Performed by: STUDENT IN AN ORGANIZED HEALTH CARE EDUCATION/TRAINING PROGRAM

## 2021-04-30 PROCEDURE — 27201089 HC SNARE, DISP (ANY): Performed by: INTERNAL MEDICINE

## 2021-04-30 PROCEDURE — 43264 PR ERCP,W/REMOVAL STONE,BIL/PANCR DUCTS: ICD-10-PCS | Mod: 51,,, | Performed by: INTERNAL MEDICINE

## 2021-04-30 PROCEDURE — 74328 X-RAY BILE DUCT ENDOSCOPY: CPT | Performed by: INTERNAL MEDICINE

## 2021-04-30 PROCEDURE — D9220A PRA ANESTHESIA: ICD-10-PCS | Mod: CRNA,,, | Performed by: NURSE ANESTHETIST, CERTIFIED REGISTERED

## 2021-04-30 PROCEDURE — 85610 PROTHROMBIN TIME: CPT | Performed by: STUDENT IN AN ORGANIZED HEALTH CARE EDUCATION/TRAINING PROGRAM

## 2021-04-30 PROCEDURE — 36415 COLL VENOUS BLD VENIPUNCTURE: CPT | Performed by: STUDENT IN AN ORGANIZED HEALTH CARE EDUCATION/TRAINING PROGRAM

## 2021-04-30 PROCEDURE — 99223 PR INITIAL HOSPITAL CARE,LEVL III: ICD-10-PCS | Mod: 25,GC,, | Performed by: INTERNAL MEDICINE

## 2021-04-30 PROCEDURE — 43276 ERCP STENT EXCHANGE W/DILATE: CPT | Mod: ,,, | Performed by: INTERNAL MEDICINE

## 2021-04-30 PROCEDURE — 37000009 HC ANESTHESIA EA ADD 15 MINS: Performed by: INTERNAL MEDICINE

## 2021-04-30 PROCEDURE — 25000003 PHARM REV CODE 250: Performed by: NURSE ANESTHETIST, CERTIFIED REGISTERED

## 2021-04-30 PROCEDURE — 43264 ERCP REMOVE DUCT CALCULI: CPT | Performed by: INTERNAL MEDICINE

## 2021-04-30 PROCEDURE — 63600175 PHARM REV CODE 636 W HCPCS: Performed by: HOSPITALIST

## 2021-04-30 PROCEDURE — 82607 VITAMIN B-12: CPT | Performed by: STUDENT IN AN ORGANIZED HEALTH CARE EDUCATION/TRAINING PROGRAM

## 2021-04-30 PROCEDURE — 25500020 PHARM REV CODE 255: Performed by: INTERNAL MEDICINE

## 2021-04-30 PROCEDURE — 83540 ASSAY OF IRON: CPT | Performed by: STUDENT IN AN ORGANIZED HEALTH CARE EDUCATION/TRAINING PROGRAM

## 2021-04-30 PROCEDURE — 84300 ASSAY OF URINE SODIUM: CPT | Performed by: STUDENT IN AN ORGANIZED HEALTH CARE EDUCATION/TRAINING PROGRAM

## 2021-04-30 PROCEDURE — C1769 GUIDE WIRE: HCPCS | Performed by: INTERNAL MEDICINE

## 2021-04-30 PROCEDURE — D9220A PRA ANESTHESIA: Mod: CRNA,,, | Performed by: NURSE ANESTHETIST, CERTIFIED REGISTERED

## 2021-04-30 PROCEDURE — 11000001 HC ACUTE MED/SURG PRIVATE ROOM

## 2021-04-30 PROCEDURE — 27000221 HC OXYGEN, UP TO 24 HOURS

## 2021-04-30 PROCEDURE — 43264 ERCP REMOVE DUCT CALCULI: CPT | Mod: 51,,, | Performed by: INTERNAL MEDICINE

## 2021-04-30 PROCEDURE — 83735 ASSAY OF MAGNESIUM: CPT | Performed by: HOSPITALIST

## 2021-04-30 PROCEDURE — 63600175 PHARM REV CODE 636 W HCPCS: Performed by: NURSE ANESTHETIST, CERTIFIED REGISTERED

## 2021-04-30 PROCEDURE — 80053 COMPREHEN METABOLIC PANEL: CPT | Performed by: STUDENT IN AN ORGANIZED HEALTH CARE EDUCATION/TRAINING PROGRAM

## 2021-04-30 PROCEDURE — 99223 PR INITIAL HOSPITAL CARE,LEVL III: ICD-10-PCS | Mod: AI,GC,, | Performed by: HOSPITALIST

## 2021-04-30 PROCEDURE — C2617 STENT, NON-COR, TEM W/O DEL: HCPCS | Performed by: INTERNAL MEDICINE

## 2021-04-30 PROCEDURE — 87040 BLOOD CULTURE FOR BACTERIA: CPT | Performed by: STUDENT IN AN ORGANIZED HEALTH CARE EDUCATION/TRAINING PROGRAM

## 2021-04-30 PROCEDURE — 83935 ASSAY OF URINE OSMOLALITY: CPT | Performed by: STUDENT IN AN ORGANIZED HEALTH CARE EDUCATION/TRAINING PROGRAM

## 2021-04-30 PROCEDURE — 83735 ASSAY OF MAGNESIUM: CPT | Mod: 91 | Performed by: STUDENT IN AN ORGANIZED HEALTH CARE EDUCATION/TRAINING PROGRAM

## 2021-04-30 PROCEDURE — 99223 1ST HOSP IP/OBS HIGH 75: CPT | Mod: 25,GC,, | Performed by: INTERNAL MEDICINE

## 2021-04-30 PROCEDURE — 25000242 PHARM REV CODE 250 ALT 637 W/ HCPCS: Performed by: STUDENT IN AN ORGANIZED HEALTH CARE EDUCATION/TRAINING PROGRAM

## 2021-04-30 PROCEDURE — 37000008 HC ANESTHESIA 1ST 15 MINUTES: Performed by: INTERNAL MEDICINE

## 2021-04-30 PROCEDURE — 82330 ASSAY OF CALCIUM: CPT | Performed by: STUDENT IN AN ORGANIZED HEALTH CARE EDUCATION/TRAINING PROGRAM

## 2021-04-30 PROCEDURE — 82010 KETONE BODYS QUAN: CPT | Performed by: STUDENT IN AN ORGANIZED HEALTH CARE EDUCATION/TRAINING PROGRAM

## 2021-04-30 PROCEDURE — D9220A PRA ANESTHESIA: ICD-10-PCS | Mod: ANES,,, | Performed by: ANESTHESIOLOGY

## 2021-04-30 PROCEDURE — 84443 ASSAY THYROID STIM HORMONE: CPT | Performed by: STUDENT IN AN ORGANIZED HEALTH CARE EDUCATION/TRAINING PROGRAM

## 2021-04-30 PROCEDURE — 27202125 HC BALLOON, EXTRACTION (ANY): Performed by: INTERNAL MEDICINE

## 2021-04-30 PROCEDURE — 27202127 HC STENT INTRODUCER: Performed by: INTERNAL MEDICINE

## 2021-04-30 PROCEDURE — 25000003 PHARM REV CODE 250: Performed by: HOSPITALIST

## 2021-04-30 PROCEDURE — 43276 PR ERCP W/RMVL/EXCH STENT BILIARY/PANCREATIC DUCT W/DILATION: ICD-10-PCS | Mod: ,,, | Performed by: INTERNAL MEDICINE

## 2021-04-30 PROCEDURE — D9220A PRA ANESTHESIA: Mod: ANES,,, | Performed by: ANESTHESIOLOGY

## 2021-04-30 PROCEDURE — 99223 1ST HOSP IP/OBS HIGH 75: CPT | Mod: AI,GC,, | Performed by: HOSPITALIST

## 2021-04-30 PROCEDURE — 83930 ASSAY OF BLOOD OSMOLALITY: CPT | Performed by: STUDENT IN AN ORGANIZED HEALTH CARE EDUCATION/TRAINING PROGRAM

## 2021-04-30 PROCEDURE — 99900035 HC TECH TIME PER 15 MIN (STAT)

## 2021-04-30 PROCEDURE — 74328 PR  X-RAY FOR BILE DUCT ENDOSCOPY: ICD-10-PCS | Mod: 26,,, | Performed by: INTERNAL MEDICINE

## 2021-04-30 DEVICE — BILIARY STENT
Type: IMPLANTABLE DEVICE | Site: BILE DUCT | Status: NON-FUNCTIONAL
Brand: ADVANIX™ BILIARY
Removed: 2021-07-01

## 2021-04-30 RX ORDER — ACETAMINOPHEN 325 MG/1
650 TABLET ORAL EVERY 4 HOURS PRN
Status: DISCONTINUED | OUTPATIENT
Start: 2021-04-30 | End: 2021-05-10 | Stop reason: HOSPADM

## 2021-04-30 RX ORDER — POTASSIUM CHLORIDE 20 MEQ/1
40 TABLET, EXTENDED RELEASE ORAL ONCE
Status: DISCONTINUED | OUTPATIENT
Start: 2021-04-30 | End: 2021-04-30

## 2021-04-30 RX ORDER — SODIUM CHLORIDE 0.9 % (FLUSH) 0.9 %
3 SYRINGE (ML) INJECTION
Status: DISCONTINUED | OUTPATIENT
Start: 2021-04-30 | End: 2021-05-10 | Stop reason: HOSPADM

## 2021-04-30 RX ORDER — FUROSEMIDE 10 MG/ML
20 INJECTION INTRAMUSCULAR; INTRAVENOUS DAILY
Status: DISCONTINUED | OUTPATIENT
Start: 2021-04-30 | End: 2021-04-30

## 2021-04-30 RX ORDER — PROPOFOL 10 MG/ML
VIAL (ML) INTRAVENOUS CONTINUOUS PRN
Status: DISCONTINUED | OUTPATIENT
Start: 2021-04-30 | End: 2021-04-30

## 2021-04-30 RX ORDER — GLUCAGON 1 MG
1 KIT INJECTION
Status: DISCONTINUED | OUTPATIENT
Start: 2021-04-30 | End: 2021-05-10 | Stop reason: HOSPADM

## 2021-04-30 RX ORDER — IBUPROFEN 200 MG
24 TABLET ORAL
Status: DISCONTINUED | OUTPATIENT
Start: 2021-04-30 | End: 2021-05-10 | Stop reason: HOSPADM

## 2021-04-30 RX ORDER — HYDROCODONE BITARTRATE AND ACETAMINOPHEN 5; 325 MG/1; MG/1
1 TABLET ORAL EVERY 6 HOURS PRN
Status: DISCONTINUED | OUTPATIENT
Start: 2021-04-30 | End: 2021-05-10 | Stop reason: HOSPADM

## 2021-04-30 RX ORDER — IPRATROPIUM BROMIDE AND ALBUTEROL SULFATE 2.5; .5 MG/3ML; MG/3ML
3 SOLUTION RESPIRATORY (INHALATION) EVERY 6 HOURS
Status: DISCONTINUED | OUTPATIENT
Start: 2021-04-30 | End: 2021-05-01

## 2021-04-30 RX ORDER — IBUPROFEN 200 MG
16 TABLET ORAL
Status: DISCONTINUED | OUTPATIENT
Start: 2021-04-30 | End: 2021-05-10 | Stop reason: HOSPADM

## 2021-04-30 RX ORDER — CEFTRIAXONE 1 G/1
1 INJECTION, POWDER, FOR SOLUTION INTRAMUSCULAR; INTRAVENOUS
Status: DISCONTINUED | OUTPATIENT
Start: 2021-04-30 | End: 2021-05-03

## 2021-04-30 RX ORDER — POLYETHYLENE GLYCOL 3350 17 G/17G
17 POWDER, FOR SOLUTION ORAL DAILY
Status: DISCONTINUED | OUTPATIENT
Start: 2021-04-30 | End: 2021-05-07

## 2021-04-30 RX ORDER — ONDANSETRON 2 MG/ML
4 INJECTION INTRAMUSCULAR; INTRAVENOUS DAILY PRN
Status: DISCONTINUED | OUTPATIENT
Start: 2021-04-30 | End: 2021-04-30 | Stop reason: HOSPADM

## 2021-04-30 RX ORDER — HYDROMORPHONE HYDROCHLORIDE 1 MG/ML
0.2 INJECTION, SOLUTION INTRAMUSCULAR; INTRAVENOUS; SUBCUTANEOUS EVERY 5 MIN PRN
Status: DISCONTINUED | OUTPATIENT
Start: 2021-04-30 | End: 2021-04-30 | Stop reason: HOSPADM

## 2021-04-30 RX ORDER — POTASSIUM CHLORIDE 20 MEQ/1
40 TABLET, EXTENDED RELEASE ORAL ONCE
Status: COMPLETED | OUTPATIENT
Start: 2021-04-30 | End: 2021-04-30

## 2021-04-30 RX ORDER — MUPIROCIN 20 MG/G
OINTMENT TOPICAL 2 TIMES DAILY
Status: DISPENSED | OUTPATIENT
Start: 2021-04-30 | End: 2021-05-05

## 2021-04-30 RX ORDER — FENTANYL CITRATE 50 UG/ML
INJECTION, SOLUTION INTRAMUSCULAR; INTRAVENOUS
Status: DISCONTINUED | OUTPATIENT
Start: 2021-04-30 | End: 2021-04-30

## 2021-04-30 RX ORDER — PANTOPRAZOLE SODIUM 40 MG/1
40 TABLET, DELAYED RELEASE ORAL DAILY
Status: DISCONTINUED | OUTPATIENT
Start: 2021-04-30 | End: 2021-05-10 | Stop reason: HOSPADM

## 2021-04-30 RX ORDER — SODIUM,POTASSIUM PHOSPHATES 280-250MG
1 POWDER IN PACKET (EA) ORAL ONCE
Status: COMPLETED | OUTPATIENT
Start: 2021-04-30 | End: 2021-04-30

## 2021-04-30 RX ORDER — AMOXICILLIN 250 MG
1 CAPSULE ORAL DAILY PRN
Status: DISCONTINUED | OUTPATIENT
Start: 2021-04-30 | End: 2021-05-10 | Stop reason: HOSPADM

## 2021-04-30 RX ORDER — PROPOFOL 10 MG/ML
VIAL (ML) INTRAVENOUS
Status: DISCONTINUED | OUTPATIENT
Start: 2021-04-30 | End: 2021-04-30

## 2021-04-30 RX ORDER — MAGNESIUM SULFATE HEPTAHYDRATE 40 MG/ML
2 INJECTION, SOLUTION INTRAVENOUS ONCE
Status: COMPLETED | OUTPATIENT
Start: 2021-04-30 | End: 2021-04-30

## 2021-04-30 RX ORDER — LIDOCAINE HYDROCHLORIDE 20 MG/ML
INJECTION INTRAVENOUS
Status: DISCONTINUED | OUTPATIENT
Start: 2021-04-30 | End: 2021-04-30

## 2021-04-30 RX ORDER — ONDANSETRON 4 MG/1
8 TABLET, ORALLY DISINTEGRATING ORAL EVERY 8 HOURS PRN
Status: DISCONTINUED | OUTPATIENT
Start: 2021-04-30 | End: 2021-05-10 | Stop reason: HOSPADM

## 2021-04-30 RX ORDER — SODIUM CHLORIDE 0.9 % (FLUSH) 0.9 %
10 SYRINGE (ML) INJECTION
Status: DISCONTINUED | OUTPATIENT
Start: 2021-04-30 | End: 2021-05-10 | Stop reason: HOSPADM

## 2021-04-30 RX ORDER — FENTANYL CITRATE 50 UG/ML
50 INJECTION, SOLUTION INTRAMUSCULAR; INTRAVENOUS EVERY 5 MIN PRN
Status: DISCONTINUED | OUTPATIENT
Start: 2021-04-30 | End: 2021-04-30 | Stop reason: HOSPADM

## 2021-04-30 RX ORDER — ONDANSETRON 2 MG/ML
INJECTION INTRAMUSCULAR; INTRAVENOUS
Status: DISCONTINUED | OUTPATIENT
Start: 2021-04-30 | End: 2021-04-30

## 2021-04-30 RX ORDER — MAGNESIUM SULFATE HEPTAHYDRATE 40 MG/ML
2 INJECTION, SOLUTION INTRAVENOUS ONCE
Status: DISCONTINUED | OUTPATIENT
Start: 2021-04-30 | End: 2021-04-30

## 2021-04-30 RX ADMIN — GLYCOPYRROLATE 0.1 MG: 0.2 INJECTION, SOLUTION INTRAMUSCULAR; INTRAVITREAL at 03:04

## 2021-04-30 RX ADMIN — PROPOFOL 75 MCG/KG/MIN: 10 INJECTION, EMULSION INTRAVENOUS at 03:04

## 2021-04-30 RX ADMIN — IPRATROPIUM BROMIDE AND ALBUTEROL SULFATE 3 ML: .5; 2.5 SOLUTION RESPIRATORY (INHALATION) at 08:04

## 2021-04-30 RX ADMIN — SODIUM CHLORIDE: 0.9 INJECTION, SOLUTION INTRAVENOUS at 03:04

## 2021-04-30 RX ADMIN — POTASSIUM CHLORIDE 40 MEQ: 1500 TABLET, EXTENDED RELEASE ORAL at 06:04

## 2021-04-30 RX ADMIN — ONDANSETRON 4 MG: 2 INJECTION, SOLUTION INTRAMUSCULAR; INTRAVENOUS at 03:04

## 2021-04-30 RX ADMIN — IOHEXOL 3 ML: 300 INJECTION, SOLUTION INTRAVENOUS at 03:04

## 2021-04-30 RX ADMIN — ACETAMINOPHEN 650 MG: 325 TABLET ORAL at 06:04

## 2021-04-30 RX ADMIN — POLYETHYLENE GLYCOL 3350 17 G: 17 POWDER, FOR SOLUTION ORAL at 06:04

## 2021-04-30 RX ADMIN — PANTOPRAZOLE SODIUM 40 MG: 40 TABLET, DELAYED RELEASE ORAL at 11:04

## 2021-04-30 RX ADMIN — VANCOMYCIN HYDROCHLORIDE 2000 MG: 5 INJECTION, POWDER, LYOPHILIZED, FOR SOLUTION INTRAVENOUS at 09:04

## 2021-04-30 RX ADMIN — LIDOCAINE HYDROCHLORIDE 100 MG: 20 INJECTION, SOLUTION INTRAVENOUS at 03:04

## 2021-04-30 RX ADMIN — FENTANYL CITRATE 25 MCG: 50 INJECTION, SOLUTION INTRAMUSCULAR; INTRAVENOUS at 03:04

## 2021-04-30 RX ADMIN — CALCIUM GLUCONATE 1 G: 98 INJECTION, SOLUTION INTRAVENOUS at 11:04

## 2021-04-30 RX ADMIN — CEFTRIAXONE 1 G: 1 INJECTION, POWDER, FOR SOLUTION INTRAMUSCULAR; INTRAVENOUS at 06:04

## 2021-04-30 RX ADMIN — CALCIUM GLUCONATE 2 G: 98 INJECTION, SOLUTION INTRAVENOUS at 07:04

## 2021-04-30 RX ADMIN — HYDROCODONE BITARTRATE AND ACETAMINOPHEN 1 TABLET: 5; 325 TABLET ORAL at 12:04

## 2021-04-30 RX ADMIN — PROPOFOL 70 MG: 10 INJECTION, EMULSION INTRAVENOUS at 03:04

## 2021-04-30 RX ADMIN — HYDROCODONE BITARTRATE AND ACETAMINOPHEN 1 TABLET: 5; 325 TABLET ORAL at 09:04

## 2021-04-30 RX ADMIN — FUROSEMIDE 20 MG: 10 INJECTION, SOLUTION INTRAMUSCULAR; INTRAVENOUS at 06:04

## 2021-04-30 RX ADMIN — MAGNESIUM SULFATE 2 G: 2 INJECTION INTRAVENOUS at 12:04

## 2021-04-30 RX ADMIN — POTASSIUM & SODIUM PHOSPHATES POWDER PACK 280-160-250 MG 1 PACKET: 280-160-250 PACK at 06:04

## 2021-04-30 RX ADMIN — MUPIROCIN: 20 OINTMENT TOPICAL at 09:04

## 2021-05-01 PROBLEM — M48.061 MYELOPATHY CONCURRENT WITH AND DUE TO STENOSIS OF LUMBAR SPINE: Status: ACTIVE | Noted: 2021-05-01

## 2021-05-01 PROBLEM — R78.81 BACTEREMIA: Status: ACTIVE | Noted: 2021-05-01

## 2021-05-01 PROBLEM — G99.2 MYELOPATHY CONCURRENT WITH AND DUE TO STENOSIS OF LUMBAR SPINE: Status: ACTIVE | Noted: 2021-05-01

## 2021-05-01 LAB
25(OH)D3+25(OH)D2 SERPL-MCNC: 33 NG/ML (ref 30–96)
ALBUMIN SERPL BCP-MCNC: 1.7 G/DL (ref 3.5–5.2)
ALBUMIN SERPL BCP-MCNC: 1.9 G/DL (ref 3.5–5.2)
ALBUMIN SERPL BCP-MCNC: 1.9 G/DL (ref 3.5–5.2)
ALBUMIN SERPL BCP-MCNC: 2.1 G/DL (ref 3.5–5.2)
ALP SERPL-CCNC: 340 U/L (ref 55–135)
ALT SERPL W/O P-5'-P-CCNC: 65 U/L (ref 10–44)
ANION GAP SERPL CALC-SCNC: 11 MMOL/L (ref 8–16)
ANION GAP SERPL CALC-SCNC: 13 MMOL/L (ref 8–16)
ANION GAP SERPL CALC-SCNC: 15 MMOL/L (ref 8–16)
ANION GAP SERPL CALC-SCNC: 19 MMOL/L (ref 8–16)
AST SERPL-CCNC: 169 U/L (ref 10–40)
BILIRUB SERPL-MCNC: 1.6 MG/DL (ref 0.1–1)
BUN SERPL-MCNC: 23 MG/DL (ref 8–23)
BUN SERPL-MCNC: 23 MG/DL (ref 8–23)
BUN SERPL-MCNC: 26 MG/DL (ref 8–23)
BUN SERPL-MCNC: 28 MG/DL (ref 8–23)
CALCIUM SERPL-MCNC: 5.8 MG/DL (ref 8.7–10.5)
CALCIUM SERPL-MCNC: 6.8 MG/DL (ref 8.7–10.5)
CALCIUM SERPL-MCNC: 8 MG/DL (ref 8.7–10.5)
CALCIUM SERPL-MCNC: 9 MG/DL (ref 8.7–10.5)
CHLORIDE SERPL-SCNC: 86 MMOL/L (ref 95–110)
CHLORIDE SERPL-SCNC: 86 MMOL/L (ref 95–110)
CHLORIDE SERPL-SCNC: 87 MMOL/L (ref 95–110)
CHLORIDE SERPL-SCNC: 88 MMOL/L (ref 95–110)
CO2 SERPL-SCNC: 18 MMOL/L (ref 23–29)
CO2 SERPL-SCNC: 21 MMOL/L (ref 23–29)
CO2 SERPL-SCNC: 25 MMOL/L (ref 23–29)
CO2 SERPL-SCNC: 26 MMOL/L (ref 23–29)
CREAT SERPL-MCNC: 0.9 MG/DL (ref 0.5–1.4)
ERYTHROCYTE [DISTWIDTH] IN BLOOD BY AUTOMATED COUNT: 16.6 % (ref 11.5–14.5)
EST. GFR  (AFRICAN AMERICAN): >60 ML/MIN/1.73 M^2
EST. GFR  (NON AFRICAN AMERICAN): >60 ML/MIN/1.73 M^2
GLUCOSE SERPL-MCNC: 114 MG/DL (ref 70–110)
GLUCOSE SERPL-MCNC: 161 MG/DL (ref 70–110)
GLUCOSE SERPL-MCNC: 192 MG/DL (ref 70–110)
GLUCOSE SERPL-MCNC: 96 MG/DL (ref 70–110)
HCT VFR BLD AUTO: 28 % (ref 37–48.5)
HGB BLD-MCNC: 9.9 G/DL (ref 12–16)
MAGNESIUM SERPL-MCNC: 1.2 MG/DL (ref 1.6–2.6)
MAGNESIUM SERPL-MCNC: 1.2 MG/DL (ref 1.6–2.6)
MAGNESIUM SERPL-MCNC: 2.2 MG/DL (ref 1.6–2.6)
MCH RBC QN AUTO: 30.1 PG (ref 27–31)
MCHC RBC AUTO-ENTMCNC: 35.4 G/DL (ref 32–36)
MCV RBC AUTO: 85 FL (ref 82–98)
OSMOLALITY UR: 274 MOSM/KG (ref 50–1200)
PHOSPHATE SERPL-MCNC: 2.1 MG/DL (ref 2.7–4.5)
PHOSPHATE SERPL-MCNC: 2.8 MG/DL (ref 2.7–4.5)
PLATELET # BLD AUTO: 179 K/UL (ref 150–450)
PMV BLD AUTO: 11 FL (ref 9.2–12.9)
POTASSIUM SERPL-SCNC: 3 MMOL/L (ref 3.5–5.1)
POTASSIUM SERPL-SCNC: 3.8 MMOL/L (ref 3.5–5.1)
POTASSIUM SERPL-SCNC: 4.8 MMOL/L (ref 3.5–5.1)
POTASSIUM SERPL-SCNC: 5.8 MMOL/L (ref 3.5–5.1)
PROT SERPL-MCNC: 6.3 G/DL (ref 6–8.4)
RBC # BLD AUTO: 3.29 M/UL (ref 4–5.4)
SODIUM SERPL-SCNC: 122 MMOL/L (ref 136–145)
SODIUM SERPL-SCNC: 123 MMOL/L (ref 136–145)
SODIUM SERPL-SCNC: 124 MMOL/L (ref 136–145)
SODIUM SERPL-SCNC: 126 MMOL/L (ref 136–145)
WBC # BLD AUTO: 4.27 K/UL (ref 3.9–12.7)

## 2021-05-01 PROCEDURE — 25000003 PHARM REV CODE 250: Performed by: STUDENT IN AN ORGANIZED HEALTH CARE EDUCATION/TRAINING PROGRAM

## 2021-05-01 PROCEDURE — 63600175 PHARM REV CODE 636 W HCPCS: Performed by: HOSPITALIST

## 2021-05-01 PROCEDURE — 25000003 PHARM REV CODE 250: Performed by: INTERNAL MEDICINE

## 2021-05-01 PROCEDURE — 99233 PR SUBSEQUENT HOSPITAL CARE,LEVL III: ICD-10-PCS | Mod: GC,,, | Performed by: HOSPITALIST

## 2021-05-01 PROCEDURE — 99233 SBSQ HOSP IP/OBS HIGH 50: CPT | Mod: GC,,, | Performed by: HOSPITALIST

## 2021-05-01 PROCEDURE — 36415 COLL VENOUS BLD VENIPUNCTURE: CPT | Performed by: STUDENT IN AN ORGANIZED HEALTH CARE EDUCATION/TRAINING PROGRAM

## 2021-05-01 PROCEDURE — 11000001 HC ACUTE MED/SURG PRIVATE ROOM

## 2021-05-01 PROCEDURE — 25000003 PHARM REV CODE 250: Performed by: GENERAL ACUTE CARE HOSPITAL

## 2021-05-01 PROCEDURE — 63600175 PHARM REV CODE 636 W HCPCS: Performed by: STUDENT IN AN ORGANIZED HEALTH CARE EDUCATION/TRAINING PROGRAM

## 2021-05-01 PROCEDURE — 94761 N-INVAS EAR/PLS OXIMETRY MLT: CPT

## 2021-05-01 PROCEDURE — 83735 ASSAY OF MAGNESIUM: CPT | Mod: 91 | Performed by: STUDENT IN AN ORGANIZED HEALTH CARE EDUCATION/TRAINING PROGRAM

## 2021-05-01 PROCEDURE — 25000003 PHARM REV CODE 250: Performed by: HOSPITALIST

## 2021-05-01 PROCEDURE — 99222 PR INITIAL HOSPITAL CARE,LEVL II: ICD-10-PCS | Mod: GC,,, | Performed by: INTERNAL MEDICINE

## 2021-05-01 PROCEDURE — 99222 1ST HOSP IP/OBS MODERATE 55: CPT | Mod: GC,,, | Performed by: INTERNAL MEDICINE

## 2021-05-01 PROCEDURE — 99222 PR INITIAL HOSPITAL CARE,LEVL II: ICD-10-PCS | Mod: ,,, | Performed by: SURGERY

## 2021-05-01 PROCEDURE — 99900035 HC TECH TIME PER 15 MIN (STAT)

## 2021-05-01 PROCEDURE — 99222 1ST HOSP IP/OBS MODERATE 55: CPT | Mod: ,,, | Performed by: SURGERY

## 2021-05-01 PROCEDURE — 93010 EKG 12-LEAD: ICD-10-PCS | Mod: ,,, | Performed by: INTERNAL MEDICINE

## 2021-05-01 PROCEDURE — 94640 AIRWAY INHALATION TREATMENT: CPT

## 2021-05-01 PROCEDURE — 63600175 PHARM REV CODE 636 W HCPCS: Performed by: INTERNAL MEDICINE

## 2021-05-01 PROCEDURE — 80069 RENAL FUNCTION PANEL: CPT | Mod: 91 | Performed by: STUDENT IN AN ORGANIZED HEALTH CARE EDUCATION/TRAINING PROGRAM

## 2021-05-01 PROCEDURE — 85027 COMPLETE CBC AUTOMATED: CPT | Performed by: INTERNAL MEDICINE

## 2021-05-01 PROCEDURE — 93010 ELECTROCARDIOGRAM REPORT: CPT | Mod: ,,, | Performed by: INTERNAL MEDICINE

## 2021-05-01 PROCEDURE — 27000221 HC OXYGEN, UP TO 24 HOURS

## 2021-05-01 PROCEDURE — 80053 COMPREHEN METABOLIC PANEL: CPT | Performed by: INTERNAL MEDICINE

## 2021-05-01 PROCEDURE — 82306 VITAMIN D 25 HYDROXY: CPT | Performed by: STUDENT IN AN ORGANIZED HEALTH CARE EDUCATION/TRAINING PROGRAM

## 2021-05-01 PROCEDURE — 93005 ELECTROCARDIOGRAM TRACING: CPT

## 2021-05-01 PROCEDURE — 83735 ASSAY OF MAGNESIUM: CPT | Performed by: STUDENT IN AN ORGANIZED HEALTH CARE EDUCATION/TRAINING PROGRAM

## 2021-05-01 PROCEDURE — 84100 ASSAY OF PHOSPHORUS: CPT | Performed by: INTERNAL MEDICINE

## 2021-05-01 PROCEDURE — 25000242 PHARM REV CODE 250 ALT 637 W/ HCPCS: Performed by: INTERNAL MEDICINE

## 2021-05-01 PROCEDURE — 80069 RENAL FUNCTION PANEL: CPT | Performed by: STUDENT IN AN ORGANIZED HEALTH CARE EDUCATION/TRAINING PROGRAM

## 2021-05-01 RX ORDER — MAGNESIUM SULFATE HEPTAHYDRATE 40 MG/ML
2 INJECTION, SOLUTION INTRAVENOUS ONCE
Status: CANCELLED | OUTPATIENT
Start: 2021-05-01

## 2021-05-01 RX ORDER — CALCIUM CARBONATE 200(500)MG
500 TABLET,CHEWABLE ORAL 2 TIMES DAILY
Status: DISCONTINUED | OUTPATIENT
Start: 2021-05-01 | End: 2021-05-01

## 2021-05-01 RX ORDER — MAGNESIUM SULFATE HEPTAHYDRATE 40 MG/ML
2 INJECTION, SOLUTION INTRAVENOUS ONCE
Status: COMPLETED | OUTPATIENT
Start: 2021-05-01 | End: 2021-05-01

## 2021-05-01 RX ORDER — FUROSEMIDE 10 MG/ML
20 INJECTION INTRAMUSCULAR; INTRAVENOUS ONCE
Status: COMPLETED | OUTPATIENT
Start: 2021-05-01 | End: 2021-05-01

## 2021-05-01 RX ORDER — IPRATROPIUM BROMIDE AND ALBUTEROL SULFATE 2.5; .5 MG/3ML; MG/3ML
3 SOLUTION RESPIRATORY (INHALATION) EVERY 6 HOURS PRN
Status: DISCONTINUED | OUTPATIENT
Start: 2021-05-01 | End: 2021-05-10 | Stop reason: HOSPADM

## 2021-05-01 RX ORDER — CALCIUM CARBONATE 200(500)MG
1000 TABLET,CHEWABLE ORAL
Status: DISCONTINUED | OUTPATIENT
Start: 2021-05-01 | End: 2021-05-02

## 2021-05-01 RX ORDER — MAGNESIUM SULFATE HEPTAHYDRATE 40 MG/ML
2 INJECTION, SOLUTION INTRAVENOUS ONCE
Status: DISCONTINUED | OUTPATIENT
Start: 2021-05-01 | End: 2021-05-01

## 2021-05-01 RX ORDER — LORAZEPAM 2 MG/ML
1 INJECTION INTRAMUSCULAR ONCE
Status: DISCONTINUED | OUTPATIENT
Start: 2021-05-01 | End: 2021-05-02

## 2021-05-01 RX ORDER — CALCITRIOL 0.25 UG/1
0.25 CAPSULE ORAL DAILY
Status: DISCONTINUED | OUTPATIENT
Start: 2021-05-01 | End: 2021-05-03

## 2021-05-01 RX ORDER — CHOLECALCIFEROL (VITAMIN D3) 25 MCG
1000 TABLET ORAL DAILY
Status: DISCONTINUED | OUTPATIENT
Start: 2021-05-01 | End: 2021-05-10 | Stop reason: HOSPADM

## 2021-05-01 RX ORDER — POTASSIUM CHLORIDE 20 MEQ/1
60 TABLET, EXTENDED RELEASE ORAL
Status: COMPLETED | OUTPATIENT
Start: 2021-05-01 | End: 2021-05-01

## 2021-05-01 RX ADMIN — CALCIUM CARBONATE (ANTACID) CHEW TAB 500 MG 1000 MG: 500 CHEW TAB at 05:05

## 2021-05-01 RX ADMIN — CALCIUM GLUCONATE 2 G: 98 INJECTION, SOLUTION INTRAVENOUS at 04:05

## 2021-05-01 RX ADMIN — POTASSIUM CHLORIDE 60 MEQ: 1500 TABLET, EXTENDED RELEASE ORAL at 04:05

## 2021-05-01 RX ADMIN — MAGNESIUM SULFATE 2 G: 2 INJECTION INTRAVENOUS at 11:05

## 2021-05-01 RX ADMIN — CALCIUM GLUCONATE 2 G: 98 INJECTION, SOLUTION INTRAVENOUS at 09:05

## 2021-05-01 RX ADMIN — CALCIUM CARBONATE (ANTACID) CHEW TAB 500 MG 1000 MG: 500 CHEW TAB at 01:05

## 2021-05-01 RX ADMIN — DEXAMETHASONE 6 MG: 4 TABLET ORAL at 09:05

## 2021-05-01 RX ADMIN — IPRATROPIUM BROMIDE AND ALBUTEROL SULFATE 3 ML: .5; 2.5 SOLUTION RESPIRATORY (INHALATION) at 01:05

## 2021-05-01 RX ADMIN — MAGNESIUM SULFATE 2 G: 2 INJECTION INTRAVENOUS at 09:05

## 2021-05-01 RX ADMIN — MUPIROCIN: 20 OINTMENT TOPICAL at 09:05

## 2021-05-01 RX ADMIN — POTASSIUM PHOSPHATE, MONOBASIC AND POTASSIUM PHOSPHATE, DIBASIC 15 MMOL: 224; 236 INJECTION, SOLUTION, CONCENTRATE INTRAVENOUS at 03:05

## 2021-05-01 RX ADMIN — Medication 1000 UNITS: at 01:05

## 2021-05-01 RX ADMIN — CALCITRIOL CAPSULES 0.25 MCG 0.25 MCG: 0.25 CAPSULE ORAL at 01:05

## 2021-05-01 RX ADMIN — CALCIUM GLUCONATE 2 G: 98 INJECTION, SOLUTION INTRAVENOUS at 11:05

## 2021-05-01 RX ADMIN — CALCIUM GLUCONATE 2 G: 98 INJECTION, SOLUTION INTRAVENOUS at 01:05

## 2021-05-01 RX ADMIN — CEFTRIAXONE 1 G: 1 INJECTION, POWDER, FOR SOLUTION INTRAMUSCULAR; INTRAVENOUS at 07:05

## 2021-05-01 RX ADMIN — CALCIUM GLUCONATE 2 G: 98 INJECTION, SOLUTION INTRAVENOUS at 02:05

## 2021-05-01 RX ADMIN — IPRATROPIUM BROMIDE AND ALBUTEROL SULFATE 3 ML: .5; 2.5 SOLUTION RESPIRATORY (INHALATION) at 07:05

## 2021-05-01 RX ADMIN — VANCOMYCIN HYDROCHLORIDE 2000 MG: 5 INJECTION, POWDER, LYOPHILIZED, FOR SOLUTION INTRAVENOUS at 09:05

## 2021-05-01 RX ADMIN — FUROSEMIDE 20 MG: 10 INJECTION, SOLUTION INTRAMUSCULAR; INTRAVENOUS at 04:05

## 2021-05-01 RX ADMIN — DEXAMETHASONE 6 MG: 4 TABLET ORAL at 01:05

## 2021-05-01 RX ADMIN — HYDROCODONE BITARTRATE AND ACETAMINOPHEN 1 TABLET: 5; 325 TABLET ORAL at 05:05

## 2021-05-01 RX ADMIN — POTASSIUM CHLORIDE 60 MEQ: 1500 TABLET, EXTENDED RELEASE ORAL at 07:05

## 2021-05-01 RX ADMIN — PANTOPRAZOLE SODIUM 40 MG: 40 TABLET, DELAYED RELEASE ORAL at 09:05

## 2021-05-02 LAB
ALBUMIN SERPL BCP-MCNC: 1.9 G/DL (ref 3.5–5.2)
ALBUMIN SERPL BCP-MCNC: 1.9 G/DL (ref 3.5–5.2)
ALBUMIN SERPL BCP-MCNC: 2 G/DL (ref 3.5–5.2)
ALBUMIN SERPL BCP-MCNC: 2 G/DL (ref 3.5–5.2)
ALBUMIN SERPL BCP-MCNC: 2.1 G/DL (ref 3.5–5.2)
ALP SERPL-CCNC: 301 U/L (ref 55–135)
ALT SERPL W/O P-5'-P-CCNC: 54 U/L (ref 10–44)
ANION GAP SERPL CALC-SCNC: 11 MMOL/L (ref 8–16)
ANION GAP SERPL CALC-SCNC: 11 MMOL/L (ref 8–16)
ANION GAP SERPL CALC-SCNC: 12 MMOL/L (ref 8–16)
ANION GAP SERPL CALC-SCNC: 13 MMOL/L (ref 8–16)
ANION GAP SERPL CALC-SCNC: 14 MMOL/L (ref 8–16)
AST SERPL-CCNC: 108 U/L (ref 10–40)
BACTERIA BLD CULT: ABNORMAL
BILIRUB SERPL-MCNC: 1.1 MG/DL (ref 0.1–1)
BUN SERPL-MCNC: 23 MG/DL (ref 8–23)
BUN SERPL-MCNC: 25 MG/DL (ref 8–23)
CALCIUM SERPL-MCNC: 8.5 MG/DL (ref 8.7–10.5)
CALCIUM SERPL-MCNC: 8.8 MG/DL (ref 8.7–10.5)
CALCIUM SERPL-MCNC: 8.9 MG/DL (ref 8.7–10.5)
CHLORIDE SERPL-SCNC: 88 MMOL/L (ref 95–110)
CHLORIDE SERPL-SCNC: 90 MMOL/L (ref 95–110)
CO2 SERPL-SCNC: 19 MMOL/L (ref 23–29)
CO2 SERPL-SCNC: 20 MMOL/L (ref 23–29)
CO2 SERPL-SCNC: 22 MMOL/L (ref 23–29)
CO2 SERPL-SCNC: 23 MMOL/L (ref 23–29)
CO2 SERPL-SCNC: 23 MMOL/L (ref 23–29)
CREAT SERPL-MCNC: 0.9 MG/DL (ref 0.5–1.4)
CREAT SERPL-MCNC: 1 MG/DL (ref 0.5–1.4)
CREAT SERPL-MCNC: 1.1 MG/DL (ref 0.5–1.4)
ERYTHROCYTE [DISTWIDTH] IN BLOOD BY AUTOMATED COUNT: 16.5 % (ref 11.5–14.5)
EST. GFR  (AFRICAN AMERICAN): 58.4 ML/MIN/1.73 M^2
EST. GFR  (AFRICAN AMERICAN): >60 ML/MIN/1.73 M^2
EST. GFR  (NON AFRICAN AMERICAN): 50.6 ML/MIN/1.73 M^2
EST. GFR  (NON AFRICAN AMERICAN): 56.8 ML/MIN/1.73 M^2
EST. GFR  (NON AFRICAN AMERICAN): >60 ML/MIN/1.73 M^2
GLUCOSE SERPL-MCNC: 126 MG/DL (ref 70–110)
GLUCOSE SERPL-MCNC: 126 MG/DL (ref 70–110)
GLUCOSE SERPL-MCNC: 130 MG/DL (ref 70–110)
GLUCOSE SERPL-MCNC: 136 MG/DL (ref 70–110)
GLUCOSE SERPL-MCNC: 247 MG/DL (ref 70–110)
HCT VFR BLD AUTO: 24.3 % (ref 37–48.5)
HGB BLD-MCNC: 8.5 G/DL (ref 12–16)
MAGNESIUM SERPL-MCNC: 1.4 MG/DL (ref 1.6–2.6)
MAGNESIUM SERPL-MCNC: 1.6 MG/DL (ref 1.6–2.6)
MAGNESIUM SERPL-MCNC: 1.7 MG/DL (ref 1.6–2.6)
MAGNESIUM SERPL-MCNC: 1.8 MG/DL (ref 1.6–2.6)
MCH RBC QN AUTO: 29.6 PG (ref 27–31)
MCHC RBC AUTO-ENTMCNC: 35 G/DL (ref 32–36)
MCV RBC AUTO: 85 FL (ref 82–98)
PHOSPHATE SERPL-MCNC: 2.7 MG/DL (ref 2.7–4.5)
PHOSPHATE SERPL-MCNC: 3.1 MG/DL (ref 2.7–4.5)
PHOSPHATE SERPL-MCNC: 3.2 MG/DL (ref 2.7–4.5)
PLATELET # BLD AUTO: 160 K/UL (ref 150–450)
PMV BLD AUTO: 11.5 FL (ref 9.2–12.9)
POTASSIUM SERPL-SCNC: 4.5 MMOL/L (ref 3.5–5.1)
POTASSIUM SERPL-SCNC: 4.8 MMOL/L (ref 3.5–5.1)
POTASSIUM SERPL-SCNC: 5.1 MMOL/L (ref 3.5–5.1)
POTASSIUM SERPL-SCNC: 5.1 MMOL/L (ref 3.5–5.1)
POTASSIUM SERPL-SCNC: 5.3 MMOL/L (ref 3.5–5.1)
PROT SERPL-MCNC: 5.7 G/DL (ref 6–8.4)
RBC # BLD AUTO: 2.87 M/UL (ref 4–5.4)
SODIUM SERPL-SCNC: 122 MMOL/L (ref 136–145)
SODIUM SERPL-SCNC: 123 MMOL/L (ref 136–145)
SODIUM SERPL-SCNC: 123 MMOL/L (ref 136–145)
SODIUM SERPL-SCNC: 124 MMOL/L (ref 136–145)
SODIUM SERPL-SCNC: 124 MMOL/L (ref 136–145)
VANCOMYCIN TROUGH SERPL-MCNC: 17.3 UG/ML (ref 10–22)
WBC # BLD AUTO: 4.06 K/UL (ref 3.9–12.7)

## 2021-05-02 PROCEDURE — 99223 1ST HOSP IP/OBS HIGH 75: CPT | Mod: ,,, | Performed by: NEUROLOGICAL SURGERY

## 2021-05-02 PROCEDURE — 80053 COMPREHEN METABOLIC PANEL: CPT | Performed by: INTERNAL MEDICINE

## 2021-05-02 PROCEDURE — 36415 COLL VENOUS BLD VENIPUNCTURE: CPT | Performed by: STUDENT IN AN ORGANIZED HEALTH CARE EDUCATION/TRAINING PROGRAM

## 2021-05-02 PROCEDURE — 99232 SBSQ HOSP IP/OBS MODERATE 35: CPT | Mod: GC,,, | Performed by: INTERNAL MEDICINE

## 2021-05-02 PROCEDURE — 25000003 PHARM REV CODE 250: Performed by: STUDENT IN AN ORGANIZED HEALTH CARE EDUCATION/TRAINING PROGRAM

## 2021-05-02 PROCEDURE — 80069 RENAL FUNCTION PANEL: CPT | Performed by: STUDENT IN AN ORGANIZED HEALTH CARE EDUCATION/TRAINING PROGRAM

## 2021-05-02 PROCEDURE — A9585 GADOBUTROL INJECTION: HCPCS | Performed by: HOSPITALIST

## 2021-05-02 PROCEDURE — 99233 PR SUBSEQUENT HOSPITAL CARE,LEVL III: ICD-10-PCS | Mod: GC,,, | Performed by: HOSPITALIST

## 2021-05-02 PROCEDURE — 11000001 HC ACUTE MED/SURG PRIVATE ROOM

## 2021-05-02 PROCEDURE — 99223 PR INITIAL HOSPITAL CARE,LEVL III: ICD-10-PCS | Mod: ,,, | Performed by: NEUROLOGICAL SURGERY

## 2021-05-02 PROCEDURE — 80202 ASSAY OF VANCOMYCIN: CPT | Performed by: HOSPITALIST

## 2021-05-02 PROCEDURE — 25000003 PHARM REV CODE 250: Performed by: GENERAL ACUTE CARE HOSPITAL

## 2021-05-02 PROCEDURE — 36415 COLL VENOUS BLD VENIPUNCTURE: CPT | Performed by: INTERNAL MEDICINE

## 2021-05-02 PROCEDURE — 99233 SBSQ HOSP IP/OBS HIGH 50: CPT | Mod: GC,,, | Performed by: HOSPITALIST

## 2021-05-02 PROCEDURE — 83735 ASSAY OF MAGNESIUM: CPT | Performed by: STUDENT IN AN ORGANIZED HEALTH CARE EDUCATION/TRAINING PROGRAM

## 2021-05-02 PROCEDURE — 63600175 PHARM REV CODE 636 W HCPCS: Performed by: STUDENT IN AN ORGANIZED HEALTH CARE EDUCATION/TRAINING PROGRAM

## 2021-05-02 PROCEDURE — 83735 ASSAY OF MAGNESIUM: CPT | Mod: 91 | Performed by: STUDENT IN AN ORGANIZED HEALTH CARE EDUCATION/TRAINING PROGRAM

## 2021-05-02 PROCEDURE — 63600175 PHARM REV CODE 636 W HCPCS: Performed by: HOSPITALIST

## 2021-05-02 PROCEDURE — 94761 N-INVAS EAR/PLS OXIMETRY MLT: CPT

## 2021-05-02 PROCEDURE — 99232 SBSQ HOSP IP/OBS MODERATE 35: CPT | Mod: ,,, | Performed by: SURGERY

## 2021-05-02 PROCEDURE — 25500020 PHARM REV CODE 255: Performed by: HOSPITALIST

## 2021-05-02 PROCEDURE — 36415 COLL VENOUS BLD VENIPUNCTURE: CPT | Performed by: HOSPITALIST

## 2021-05-02 PROCEDURE — 83735 ASSAY OF MAGNESIUM: CPT | Mod: 91 | Performed by: INTERNAL MEDICINE

## 2021-05-02 PROCEDURE — 63600175 PHARM REV CODE 636 W HCPCS: Performed by: INTERNAL MEDICINE

## 2021-05-02 PROCEDURE — 99232 PR SUBSEQUENT HOSPITAL CARE,LEVL II: ICD-10-PCS | Mod: GC,,, | Performed by: INTERNAL MEDICINE

## 2021-05-02 PROCEDURE — 84100 ASSAY OF PHOSPHORUS: CPT | Performed by: INTERNAL MEDICINE

## 2021-05-02 PROCEDURE — 99232 PR SUBSEQUENT HOSPITAL CARE,LEVL II: ICD-10-PCS | Mod: ,,, | Performed by: SURGERY

## 2021-05-02 PROCEDURE — 25000003 PHARM REV CODE 250: Performed by: HOSPITALIST

## 2021-05-02 PROCEDURE — 97165 OT EVAL LOW COMPLEX 30 MIN: CPT

## 2021-05-02 PROCEDURE — 97530 THERAPEUTIC ACTIVITIES: CPT

## 2021-05-02 PROCEDURE — 25000003 PHARM REV CODE 250: Performed by: INTERNAL MEDICINE

## 2021-05-02 PROCEDURE — 85027 COMPLETE CBC AUTOMATED: CPT | Performed by: INTERNAL MEDICINE

## 2021-05-02 PROCEDURE — 80069 RENAL FUNCTION PANEL: CPT | Mod: 91 | Performed by: STUDENT IN AN ORGANIZED HEALTH CARE EDUCATION/TRAINING PROGRAM

## 2021-05-02 PROCEDURE — 27000221 HC OXYGEN, UP TO 24 HOURS

## 2021-05-02 RX ORDER — LORAZEPAM 2 MG/ML
1 INJECTION INTRAMUSCULAR ONCE AS NEEDED
Status: COMPLETED | OUTPATIENT
Start: 2021-05-02 | End: 2021-05-02

## 2021-05-02 RX ORDER — MIDAZOLAM HYDROCHLORIDE 1 MG/ML
1 INJECTION INTRAMUSCULAR; INTRAVENOUS ONCE
Status: COMPLETED | OUTPATIENT
Start: 2021-05-02 | End: 2021-05-02

## 2021-05-02 RX ORDER — CALCIUM CARBONATE 200(500)MG
500 TABLET,CHEWABLE ORAL
Status: DISCONTINUED | OUTPATIENT
Start: 2021-05-02 | End: 2021-05-02

## 2021-05-02 RX ORDER — MAGNESIUM SULFATE HEPTAHYDRATE 40 MG/ML
2 INJECTION, SOLUTION INTRAVENOUS ONCE
Status: COMPLETED | OUTPATIENT
Start: 2021-05-02 | End: 2021-05-02

## 2021-05-02 RX ORDER — GADOBUTROL 604.72 MG/ML
10 INJECTION INTRAVENOUS
Status: COMPLETED | OUTPATIENT
Start: 2021-05-02 | End: 2021-05-02

## 2021-05-02 RX ORDER — CALCIUM CARBONATE 200(500)MG
500 TABLET,CHEWABLE ORAL
Status: DISCONTINUED | OUTPATIENT
Start: 2021-05-02 | End: 2021-05-03

## 2021-05-02 RX ORDER — MIDAZOLAM HYDROCHLORIDE 5 MG/ML
1 INJECTION INTRAMUSCULAR; INTRAVENOUS ONCE
Status: DISCONTINUED | OUTPATIENT
Start: 2021-05-02 | End: 2021-05-02

## 2021-05-02 RX ADMIN — MIDAZOLAM HYDROCHLORIDE 1 MG: 1 INJECTION, SOLUTION INTRAMUSCULAR; INTRAVENOUS at 01:05

## 2021-05-02 RX ADMIN — MAGNESIUM SULFATE 2 G: 2 INJECTION INTRAVENOUS at 08:05

## 2021-05-02 RX ADMIN — CALCITRIOL CAPSULES 0.25 MCG 0.25 MCG: 0.25 CAPSULE ORAL at 10:05

## 2021-05-02 RX ADMIN — Medication 1000 UNITS: at 10:05

## 2021-05-02 RX ADMIN — HYDROCODONE BITARTRATE AND ACETAMINOPHEN 1 TABLET: 5; 325 TABLET ORAL at 07:05

## 2021-05-02 RX ADMIN — MUPIROCIN: 20 OINTMENT TOPICAL at 10:05

## 2021-05-02 RX ADMIN — LORAZEPAM 1 MG: 2 INJECTION INTRAMUSCULAR; INTRAVENOUS at 11:05

## 2021-05-02 RX ADMIN — MIDAZOLAM HYDROCHLORIDE 1 MG: 1 INJECTION, SOLUTION INTRAMUSCULAR; INTRAVENOUS at 12:05

## 2021-05-02 RX ADMIN — GADOBUTROL 10 ML: 604.72 INJECTION INTRAVENOUS at 01:05

## 2021-05-02 RX ADMIN — CEFTRIAXONE 1 G: 1 INJECTION, POWDER, FOR SOLUTION INTRAMUSCULAR; INTRAVENOUS at 08:05

## 2021-05-02 RX ADMIN — MUPIROCIN: 20 OINTMENT TOPICAL at 08:05

## 2021-05-02 RX ADMIN — VANCOMYCIN HYDROCHLORIDE 2000 MG: 5 INJECTION, POWDER, LYOPHILIZED, FOR SOLUTION INTRAVENOUS at 10:05

## 2021-05-02 RX ADMIN — PANTOPRAZOLE SODIUM 40 MG: 40 TABLET, DELAYED RELEASE ORAL at 10:05

## 2021-05-02 RX ADMIN — CALCIUM CARBONATE (ANTACID) CHEW TAB 500 MG 500 MG: 500 CHEW TAB at 05:05

## 2021-05-02 RX ADMIN — HYDROCODONE BITARTRATE AND ACETAMINOPHEN 1 TABLET: 5; 325 TABLET ORAL at 01:05

## 2021-05-03 PROBLEM — E87.29 HIGH ANION GAP METABOLIC ACIDOSIS: Status: RESOLVED | Noted: 2021-04-30 | Resolved: 2021-05-03

## 2021-05-03 LAB
ALBUMIN SERPL BCP-MCNC: 1.8 G/DL (ref 3.5–5.2)
ALBUMIN SERPL BCP-MCNC: 2 G/DL (ref 3.5–5.2)
ALBUMIN SERPL BCP-MCNC: 2 G/DL (ref 3.5–5.2)
ANION GAP SERPL CALC-SCNC: 10 MMOL/L (ref 8–16)
ANION GAP SERPL CALC-SCNC: 11 MMOL/L (ref 8–16)
ANION GAP SERPL CALC-SCNC: 8 MMOL/L (ref 8–16)
ASCENDING AORTA: 3.06 CM
AV INDEX (PROSTH): 0.64
AV MEAN GRADIENT: 10 MMHG
AV PEAK GRADIENT: 15 MMHG
AV VALVE AREA: 2 CM2
AV VELOCITY RATIO: 0.71
BSA FOR ECHO PROCEDURE: 2.13 M2
BUN SERPL-MCNC: 23 MG/DL (ref 8–23)
BUN SERPL-MCNC: 23 MG/DL (ref 8–23)
BUN SERPL-MCNC: 24 MG/DL (ref 8–23)
CALCIUM SERPL-MCNC: 8.4 MG/DL (ref 8.7–10.5)
CALCIUM SERPL-MCNC: 9.1 MG/DL (ref 8.7–10.5)
CALCIUM SERPL-MCNC: 9.3 MG/DL (ref 8.7–10.5)
CHLORIDE SERPL-SCNC: 89 MMOL/L (ref 95–110)
CHLORIDE SERPL-SCNC: 89 MMOL/L (ref 95–110)
CHLORIDE SERPL-SCNC: 91 MMOL/L (ref 95–110)
CO2 SERPL-SCNC: 23 MMOL/L (ref 23–29)
CO2 SERPL-SCNC: 24 MMOL/L (ref 23–29)
CO2 SERPL-SCNC: 27 MMOL/L (ref 23–29)
CREAT SERPL-MCNC: 0.8 MG/DL (ref 0.5–1.4)
CREAT SERPL-MCNC: 0.8 MG/DL (ref 0.5–1.4)
CREAT SERPL-MCNC: 0.9 MG/DL (ref 0.5–1.4)
CV ECHO LV RWT: 0.37 CM
DOP CALC AO PEAK VEL: 1.91 M/S
DOP CALC AO VTI: 34.25 CM
DOP CALC LVOT AREA: 3.1 CM2
DOP CALC LVOT DIAMETER: 1.99 CM
DOP CALC LVOT PEAK VEL: 1.35 M/S
DOP CALC LVOT STROKE VOLUME: 68.61 CM3
DOP CALCLVOT PEAK VEL VTI: 22.07 CM
ECHO LV POSTERIOR WALL: 0.91 CM (ref 0.6–1.1)
EJECTION FRACTION: 38 %
ERYTHROCYTE [DISTWIDTH] IN BLOOD BY AUTOMATED COUNT: 17.2 % (ref 11.5–14.5)
EST. GFR  (AFRICAN AMERICAN): >60 ML/MIN/1.73 M^2
EST. GFR  (NON AFRICAN AMERICAN): >60 ML/MIN/1.73 M^2
FRACTIONAL SHORTENING: 17 % (ref 28–44)
GLUCOSE SERPL-MCNC: 106 MG/DL (ref 70–110)
GLUCOSE SERPL-MCNC: 139 MG/DL (ref 70–110)
GLUCOSE SERPL-MCNC: 143 MG/DL (ref 70–110)
HCT VFR BLD AUTO: 24.8 % (ref 37–48.5)
HGB BLD-MCNC: 8.6 G/DL (ref 12–16)
INTERVENTRICULAR SEPTUM: 0.92 CM (ref 0.6–1.1)
LA MAJOR: 6 CM
LA MINOR: 5.55 CM
LA WIDTH: 3.89 CM
LEFT ATRIUM SIZE: 3.72 CM
LEFT ATRIUM VOLUME INDEX: 34.4 ML/M2
LEFT ATRIUM VOLUME: 70.93 CM3
LEFT INTERNAL DIMENSION IN SYSTOLE: 4.07 CM (ref 2.1–4)
LEFT VENTRICLE DIASTOLIC VOLUME INDEX: 55.58 ML/M2
LEFT VENTRICLE DIASTOLIC VOLUME: 114.5 ML
LEFT VENTRICLE MASS INDEX: 77 G/M2
LEFT VENTRICLE SYSTOLIC VOLUME INDEX: 35.4 ML/M2
LEFT VENTRICLE SYSTOLIC VOLUME: 72.85 ML
LEFT VENTRICULAR INTERNAL DIMENSION IN DIASTOLE: 4.93 CM (ref 3.5–6)
LEFT VENTRICULAR MASS: 157.93 G
MAGNESIUM SERPL-MCNC: 1.3 MG/DL (ref 1.6–2.6)
MAGNESIUM SERPL-MCNC: 2.4 MG/DL (ref 1.6–2.6)
MAGNESIUM SERPL-MCNC: 2.8 MG/DL (ref 1.6–2.6)
MCH RBC QN AUTO: 30.7 PG (ref 27–31)
MCHC RBC AUTO-ENTMCNC: 34.7 G/DL (ref 32–36)
MCV RBC AUTO: 89 FL (ref 82–98)
PHOSPHATE SERPL-MCNC: 3.1 MG/DL (ref 2.7–4.5)
PHOSPHATE SERPL-MCNC: 3.4 MG/DL (ref 2.7–4.5)
PHOSPHATE SERPL-MCNC: 3.6 MG/DL (ref 2.7–4.5)
PHOSPHATE SERPL-MCNC: 3.8 MG/DL (ref 2.7–4.5)
PLATELET # BLD AUTO: 187 K/UL (ref 150–450)
PMV BLD AUTO: 11.5 FL (ref 9.2–12.9)
POTASSIUM SERPL-SCNC: 4.1 MMOL/L (ref 3.5–5.1)
POTASSIUM SERPL-SCNC: 4.1 MMOL/L (ref 3.5–5.1)
POTASSIUM SERPL-SCNC: 4.2 MMOL/L (ref 3.5–5.1)
RA MAJOR: 5.13 CM
RA PRESSURE: 3 MMHG
RA WIDTH: 2.7 CM
RBC # BLD AUTO: 2.8 M/UL (ref 4–5.4)
RIGHT VENTRICULAR END-DIASTOLIC DIMENSION: 2.87 CM
SINUS: 2.85 CM
SODIUM SERPL-SCNC: 123 MMOL/L (ref 136–145)
SODIUM SERPL-SCNC: 123 MMOL/L (ref 136–145)
SODIUM SERPL-SCNC: 126 MMOL/L (ref 136–145)
STJ: 2.43 CM
TDI LATERAL: 0.08 M/S
TDI SEPTAL: 0.1 M/S
TDI: 0.09 M/S
TRICUSPID ANNULAR PLANE SYSTOLIC EXCURSION: 1.36 CM
WBC # BLD AUTO: 6.13 K/UL (ref 3.9–12.7)

## 2021-05-03 PROCEDURE — 83735 ASSAY OF MAGNESIUM: CPT | Mod: 91 | Performed by: STUDENT IN AN ORGANIZED HEALTH CARE EDUCATION/TRAINING PROGRAM

## 2021-05-03 PROCEDURE — 83735 ASSAY OF MAGNESIUM: CPT | Performed by: STUDENT IN AN ORGANIZED HEALTH CARE EDUCATION/TRAINING PROGRAM

## 2021-05-03 PROCEDURE — 11000001 HC ACUTE MED/SURG PRIVATE ROOM

## 2021-05-03 PROCEDURE — 25000003 PHARM REV CODE 250: Performed by: HOSPITALIST

## 2021-05-03 PROCEDURE — 99232 PR SUBSEQUENT HOSPITAL CARE,LEVL II: ICD-10-PCS | Mod: ,,, | Performed by: INTERNAL MEDICINE

## 2021-05-03 PROCEDURE — 99232 SBSQ HOSP IP/OBS MODERATE 35: CPT | Mod: ,,, | Performed by: SURGERY

## 2021-05-03 PROCEDURE — 36415 COLL VENOUS BLD VENIPUNCTURE: CPT | Performed by: STUDENT IN AN ORGANIZED HEALTH CARE EDUCATION/TRAINING PROGRAM

## 2021-05-03 PROCEDURE — 99232 SBSQ HOSP IP/OBS MODERATE 35: CPT | Mod: ,,, | Performed by: INTERNAL MEDICINE

## 2021-05-03 PROCEDURE — 99233 PR SUBSEQUENT HOSPITAL CARE,LEVL III: ICD-10-PCS | Mod: ,,, | Performed by: NEUROLOGICAL SURGERY

## 2021-05-03 PROCEDURE — 25500020 PHARM REV CODE 255: Performed by: INTERNAL MEDICINE

## 2021-05-03 PROCEDURE — 80069 RENAL FUNCTION PANEL: CPT | Mod: 91 | Performed by: STUDENT IN AN ORGANIZED HEALTH CARE EDUCATION/TRAINING PROGRAM

## 2021-05-03 PROCEDURE — 80053 COMPREHEN METABOLIC PANEL: CPT | Performed by: INTERNAL MEDICINE

## 2021-05-03 PROCEDURE — 25000003 PHARM REV CODE 250: Performed by: GENERAL ACUTE CARE HOSPITAL

## 2021-05-03 PROCEDURE — 25000003 PHARM REV CODE 250: Performed by: STUDENT IN AN ORGANIZED HEALTH CARE EDUCATION/TRAINING PROGRAM

## 2021-05-03 PROCEDURE — 63600175 PHARM REV CODE 636 W HCPCS: Performed by: STUDENT IN AN ORGANIZED HEALTH CARE EDUCATION/TRAINING PROGRAM

## 2021-05-03 PROCEDURE — 99232 PR SUBSEQUENT HOSPITAL CARE,LEVL II: ICD-10-PCS | Mod: GC,,, | Performed by: HOSPITALIST

## 2021-05-03 PROCEDURE — 99232 PR SUBSEQUENT HOSPITAL CARE,LEVL II: ICD-10-PCS | Mod: ,,, | Performed by: SURGERY

## 2021-05-03 PROCEDURE — 85027 COMPLETE CBC AUTOMATED: CPT | Performed by: INTERNAL MEDICINE

## 2021-05-03 PROCEDURE — 36415 COLL VENOUS BLD VENIPUNCTURE: CPT | Performed by: INTERNAL MEDICINE

## 2021-05-03 PROCEDURE — 84100 ASSAY OF PHOSPHORUS: CPT | Performed by: INTERNAL MEDICINE

## 2021-05-03 PROCEDURE — 80069 RENAL FUNCTION PANEL: CPT | Performed by: STUDENT IN AN ORGANIZED HEALTH CARE EDUCATION/TRAINING PROGRAM

## 2021-05-03 PROCEDURE — 99233 SBSQ HOSP IP/OBS HIGH 50: CPT | Mod: ,,, | Performed by: NEUROLOGICAL SURGERY

## 2021-05-03 PROCEDURE — 99232 SBSQ HOSP IP/OBS MODERATE 35: CPT | Mod: GC,,, | Performed by: HOSPITALIST

## 2021-05-03 RX ORDER — AMOXICILLIN AND CLAVULANATE POTASSIUM 875; 125 MG/1; MG/1
1 TABLET, FILM COATED ORAL EVERY 12 HOURS
Status: DISCONTINUED | OUTPATIENT
Start: 2021-05-03 | End: 2021-05-05

## 2021-05-03 RX ORDER — MAGNESIUM SULFATE HEPTAHYDRATE 40 MG/ML
2 INJECTION, SOLUTION INTRAVENOUS
Status: COMPLETED | OUTPATIENT
Start: 2021-05-03 | End: 2021-05-03

## 2021-05-03 RX ADMIN — AMOXICILLIN AND CLAVULANATE POTASSIUM 1 TABLET: 875; 125 TABLET, FILM COATED ORAL at 09:05

## 2021-05-03 RX ADMIN — CALCIUM CARBONATE (ANTACID) CHEW TAB 500 MG 500 MG: 500 CHEW TAB at 11:05

## 2021-05-03 RX ADMIN — HUMAN ALBUMIN MICROSPHERES AND PERFLUTREN 0.66 MG: 10; .22 INJECTION, SOLUTION INTRAVENOUS at 08:05

## 2021-05-03 RX ADMIN — CALCITRIOL CAPSULES 0.25 MCG 0.25 MCG: 0.25 CAPSULE ORAL at 08:05

## 2021-05-03 RX ADMIN — MAGNESIUM SULFATE 2 G: 2 INJECTION INTRAVENOUS at 06:05

## 2021-05-03 RX ADMIN — Medication 1000 UNITS: at 08:05

## 2021-05-03 RX ADMIN — PANTOPRAZOLE SODIUM 40 MG: 40 TABLET, DELAYED RELEASE ORAL at 08:05

## 2021-05-03 RX ADMIN — CALCIUM CARBONATE (ANTACID) CHEW TAB 500 MG 500 MG: 500 CHEW TAB at 07:05

## 2021-05-03 RX ADMIN — MUPIROCIN: 20 OINTMENT TOPICAL at 08:05

## 2021-05-03 RX ADMIN — AMOXICILLIN AND CLAVULANATE POTASSIUM 1 TABLET: 875; 125 TABLET, FILM COATED ORAL at 01:05

## 2021-05-03 RX ADMIN — MAGNESIUM SULFATE 2 G: 2 INJECTION INTRAVENOUS at 04:05

## 2021-05-04 PROBLEM — I50.42 CHRONIC COMBINED SYSTOLIC AND DIASTOLIC CHF (CONGESTIVE HEART FAILURE): Status: ACTIVE | Noted: 2018-09-17

## 2021-05-04 PROBLEM — Z74.09 IMPAIRED MOBILITY AND ADLS: Status: ACTIVE | Noted: 2021-05-04

## 2021-05-04 PROBLEM — Z78.9 IMPAIRED MOBILITY AND ADLS: Status: ACTIVE | Noted: 2021-05-04

## 2021-05-04 LAB
ALBUMIN SERPL BCP-MCNC: 1.9 G/DL (ref 3.5–5.2)
ALBUMIN SERPL BCP-MCNC: 2.1 G/DL (ref 3.5–5.2)
ALBUMIN SERPL BCP-MCNC: 2.1 G/DL (ref 3.5–5.2)
ALP SERPL-CCNC: 277 U/L (ref 55–135)
ALP SERPL-CCNC: 304 U/L (ref 55–135)
ALT SERPL W/O P-5'-P-CCNC: 39 U/L (ref 10–44)
ALT SERPL W/O P-5'-P-CCNC: 49 U/L (ref 10–44)
ANION GAP SERPL CALC-SCNC: 16 MMOL/L (ref 8–16)
ANION GAP SERPL CALC-SCNC: 16 MMOL/L (ref 8–16)
ANION GAP SERPL CALC-SCNC: 9 MMOL/L (ref 8–16)
AST SERPL-CCNC: 56 U/L (ref 10–40)
AST SERPL-CCNC: 87 U/L (ref 10–40)
BACTERIA BLD CULT: NORMAL
BACTERIA UR CULT: ABNORMAL
BILIRUB SERPL-MCNC: 0.7 MG/DL (ref 0.1–1)
BILIRUB SERPL-MCNC: 0.8 MG/DL (ref 0.1–1)
BUN SERPL-MCNC: 24 MG/DL (ref 8–23)
BUN SERPL-MCNC: 24 MG/DL (ref 8–23)
BUN SERPL-MCNC: 25 MG/DL (ref 8–23)
CALCIUM SERPL-MCNC: 9 MG/DL (ref 8.7–10.5)
CALCIUM SERPL-MCNC: 9 MG/DL (ref 8.7–10.5)
CALCIUM SERPL-MCNC: 9.1 MG/DL (ref 8.7–10.5)
CHLORIDE SERPL-SCNC: 91 MMOL/L (ref 95–110)
CHLORIDE SERPL-SCNC: 91 MMOL/L (ref 95–110)
CHLORIDE SERPL-SCNC: 92 MMOL/L (ref 95–110)
CO2 SERPL-SCNC: 16 MMOL/L (ref 23–29)
CO2 SERPL-SCNC: 16 MMOL/L (ref 23–29)
CO2 SERPL-SCNC: 25 MMOL/L (ref 23–29)
CREAT SERPL-MCNC: 0.8 MG/DL (ref 0.5–1.4)
ERYTHROCYTE [DISTWIDTH] IN BLOOD BY AUTOMATED COUNT: 17 % (ref 11.5–14.5)
EST. GFR  (AFRICAN AMERICAN): >60 ML/MIN/1.73 M^2
EST. GFR  (NON AFRICAN AMERICAN): >60 ML/MIN/1.73 M^2
GLUCOSE SERPL-MCNC: 128 MG/DL (ref 70–110)
GLUCOSE SERPL-MCNC: 95 MG/DL (ref 70–110)
GLUCOSE SERPL-MCNC: 95 MG/DL (ref 70–110)
HCT VFR BLD AUTO: 21 % (ref 37–48.5)
HGB BLD-MCNC: 7.3 G/DL (ref 12–16)
MAGNESIUM SERPL-MCNC: 2 MG/DL (ref 1.6–2.6)
MCH RBC QN AUTO: 30.2 PG (ref 27–31)
MCHC RBC AUTO-ENTMCNC: 34.8 G/DL (ref 32–36)
MCV RBC AUTO: 87 FL (ref 82–98)
PHOSPHATE SERPL-MCNC: 3.4 MG/DL (ref 2.7–4.5)
PHOSPHATE SERPL-MCNC: 4.9 MG/DL (ref 2.7–4.5)
PLATELET # BLD AUTO: 165 K/UL (ref 150–450)
PMV BLD AUTO: 11.6 FL (ref 9.2–12.9)
POTASSIUM SERPL-SCNC: 3.9 MMOL/L (ref 3.5–5.1)
POTASSIUM SERPL-SCNC: 4.9 MMOL/L (ref 3.5–5.1)
POTASSIUM SERPL-SCNC: 4.9 MMOL/L (ref 3.5–5.1)
PROT SERPL-MCNC: 5.3 G/DL (ref 6–8.4)
PROT SERPL-MCNC: 5.9 G/DL (ref 6–8.4)
RBC # BLD AUTO: 2.42 M/UL (ref 4–5.4)
SODIUM SERPL-SCNC: 123 MMOL/L (ref 136–145)
SODIUM SERPL-SCNC: 123 MMOL/L (ref 136–145)
SODIUM SERPL-SCNC: 126 MMOL/L (ref 136–145)
WBC # BLD AUTO: 5.39 K/UL (ref 3.9–12.7)

## 2021-05-04 PROCEDURE — 84100 ASSAY OF PHOSPHORUS: CPT | Performed by: INTERNAL MEDICINE

## 2021-05-04 PROCEDURE — 97162 PT EVAL MOD COMPLEX 30 MIN: CPT

## 2021-05-04 PROCEDURE — 83735 ASSAY OF MAGNESIUM: CPT | Performed by: STUDENT IN AN ORGANIZED HEALTH CARE EDUCATION/TRAINING PROGRAM

## 2021-05-04 PROCEDURE — 25000003 PHARM REV CODE 250: Performed by: STUDENT IN AN ORGANIZED HEALTH CARE EDUCATION/TRAINING PROGRAM

## 2021-05-04 PROCEDURE — 99233 PR SUBSEQUENT HOSPITAL CARE,LEVL III: ICD-10-PCS | Mod: ,,, | Performed by: NEUROLOGICAL SURGERY

## 2021-05-04 PROCEDURE — 25000003 PHARM REV CODE 250: Performed by: INTERNAL MEDICINE

## 2021-05-04 PROCEDURE — 85027 COMPLETE CBC AUTOMATED: CPT | Performed by: INTERNAL MEDICINE

## 2021-05-04 PROCEDURE — 25000003 PHARM REV CODE 250: Performed by: HOSPITALIST

## 2021-05-04 PROCEDURE — 36415 COLL VENOUS BLD VENIPUNCTURE: CPT | Performed by: STUDENT IN AN ORGANIZED HEALTH CARE EDUCATION/TRAINING PROGRAM

## 2021-05-04 PROCEDURE — 99232 PR SUBSEQUENT HOSPITAL CARE,LEVL II: ICD-10-PCS | Mod: GC,,, | Performed by: HOSPITALIST

## 2021-05-04 PROCEDURE — 99233 SBSQ HOSP IP/OBS HIGH 50: CPT | Mod: ,,, | Performed by: NEUROLOGICAL SURGERY

## 2021-05-04 PROCEDURE — 97530 THERAPEUTIC ACTIVITIES: CPT

## 2021-05-04 PROCEDURE — 99900035 HC TECH TIME PER 15 MIN (STAT)

## 2021-05-04 PROCEDURE — 11000001 HC ACUTE MED/SURG PRIVATE ROOM

## 2021-05-04 PROCEDURE — 27000221 HC OXYGEN, UP TO 24 HOURS

## 2021-05-04 PROCEDURE — 99222 1ST HOSP IP/OBS MODERATE 55: CPT | Mod: ,,, | Performed by: NURSE PRACTITIONER

## 2021-05-04 PROCEDURE — 36415 COLL VENOUS BLD VENIPUNCTURE: CPT | Performed by: INTERNAL MEDICINE

## 2021-05-04 PROCEDURE — 80053 COMPREHEN METABOLIC PANEL: CPT | Performed by: INTERNAL MEDICINE

## 2021-05-04 PROCEDURE — 99232 SBSQ HOSP IP/OBS MODERATE 35: CPT | Mod: GC,,, | Performed by: HOSPITALIST

## 2021-05-04 PROCEDURE — 94761 N-INVAS EAR/PLS OXIMETRY MLT: CPT

## 2021-05-04 PROCEDURE — 99222 PR INITIAL HOSPITAL CARE,LEVL II: ICD-10-PCS | Mod: ,,, | Performed by: NURSE PRACTITIONER

## 2021-05-04 RX ORDER — CALCIUM CARBONATE 200(500)MG
500 TABLET,CHEWABLE ORAL 2 TIMES DAILY
Status: DISCONTINUED | OUTPATIENT
Start: 2021-05-04 | End: 2021-05-04

## 2021-05-04 RX ORDER — TORSEMIDE 20 MG/1
40 TABLET ORAL DAILY
Status: DISCONTINUED | OUTPATIENT
Start: 2021-05-04 | End: 2021-05-10 | Stop reason: HOSPADM

## 2021-05-04 RX ORDER — CARVEDILOL 3.12 MG/1
3.12 TABLET ORAL 2 TIMES DAILY
Status: DISCONTINUED | OUTPATIENT
Start: 2021-05-04 | End: 2021-05-05

## 2021-05-04 RX ORDER — ATORVASTATIN CALCIUM 20 MG/1
40 TABLET, FILM COATED ORAL NIGHTLY
Status: DISCONTINUED | OUTPATIENT
Start: 2021-05-04 | End: 2021-05-10 | Stop reason: HOSPADM

## 2021-05-04 RX ORDER — LOSARTAN POTASSIUM 25 MG/1
25 TABLET ORAL DAILY
Status: DISCONTINUED | OUTPATIENT
Start: 2021-05-04 | End: 2021-05-07

## 2021-05-04 RX ORDER — NAPROXEN SODIUM 220 MG/1
81 TABLET, FILM COATED ORAL DAILY
Status: DISCONTINUED | OUTPATIENT
Start: 2021-05-04 | End: 2021-05-10 | Stop reason: HOSPADM

## 2021-05-04 RX ADMIN — MUPIROCIN: 20 OINTMENT TOPICAL at 09:05

## 2021-05-04 RX ADMIN — CARVEDILOL 3.12 MG: 3.12 TABLET, FILM COATED ORAL at 09:05

## 2021-05-04 RX ADMIN — PANTOPRAZOLE SODIUM 40 MG: 40 TABLET, DELAYED RELEASE ORAL at 09:05

## 2021-05-04 RX ADMIN — LOSARTAN POTASSIUM 25 MG: 25 TABLET, FILM COATED ORAL at 09:05

## 2021-05-04 RX ADMIN — Medication 200 MG: at 10:05

## 2021-05-04 RX ADMIN — AMOXICILLIN AND CLAVULANATE POTASSIUM 1 TABLET: 875; 125 TABLET, FILM COATED ORAL at 09:05

## 2021-05-04 RX ADMIN — HYDROCODONE BITARTRATE AND ACETAMINOPHEN 1 TABLET: 5; 325 TABLET ORAL at 09:05

## 2021-05-04 RX ADMIN — TORSEMIDE 40 MG: 20 TABLET ORAL at 10:05

## 2021-05-04 RX ADMIN — ATORVASTATIN CALCIUM 40 MG: 20 TABLET, FILM COATED ORAL at 09:05

## 2021-05-04 RX ADMIN — Medication 200 MG: at 09:05

## 2021-05-04 RX ADMIN — ASPIRIN 81 MG CHEWABLE TABLET 81 MG: 81 TABLET CHEWABLE at 09:05

## 2021-05-04 RX ADMIN — Medication 1000 UNITS: at 09:05

## 2021-05-05 LAB
ALBUMIN SERPL BCP-MCNC: 1.8 G/DL (ref 3.5–5.2)
ALP SERPL-CCNC: 228 U/L (ref 55–135)
ALT SERPL W/O P-5'-P-CCNC: 36 U/L (ref 10–44)
ANION GAP SERPL CALC-SCNC: 11 MMOL/L (ref 8–16)
AST SERPL-CCNC: 49 U/L (ref 10–40)
BACTERIA BLD CULT: NORMAL
BACTERIA BLD CULT: NORMAL
BILIRUB SERPL-MCNC: 0.6 MG/DL (ref 0.1–1)
BUN SERPL-MCNC: 30 MG/DL (ref 8–23)
CALCIUM SERPL-MCNC: 8.8 MG/DL (ref 8.7–10.5)
CHLORIDE SERPL-SCNC: 94 MMOL/L (ref 95–110)
CO2 SERPL-SCNC: 24 MMOL/L (ref 23–29)
CREAT SERPL-MCNC: 0.9 MG/DL (ref 0.5–1.4)
ERYTHROCYTE [DISTWIDTH] IN BLOOD BY AUTOMATED COUNT: 17.3 % (ref 11.5–14.5)
EST. GFR  (AFRICAN AMERICAN): >60 ML/MIN/1.73 M^2
EST. GFR  (NON AFRICAN AMERICAN): >60 ML/MIN/1.73 M^2
GLUCOSE SERPL-MCNC: 101 MG/DL (ref 70–110)
HCT VFR BLD AUTO: 22.2 % (ref 37–48.5)
HGB BLD-MCNC: 7.6 G/DL (ref 12–16)
MAGNESIUM SERPL-MCNC: 1.2 MG/DL (ref 1.6–2.6)
MCH RBC QN AUTO: 30.5 PG (ref 27–31)
MCHC RBC AUTO-ENTMCNC: 34.2 G/DL (ref 32–36)
MCV RBC AUTO: 89 FL (ref 82–98)
PHOSPHATE SERPL-MCNC: 5.7 MG/DL (ref 2.7–4.5)
PLATELET # BLD AUTO: 185 K/UL (ref 150–450)
PMV BLD AUTO: 11.3 FL (ref 9.2–12.9)
POTASSIUM SERPL-SCNC: 3.7 MMOL/L (ref 3.5–5.1)
PROT SERPL-MCNC: 5.1 G/DL (ref 6–8.4)
RBC # BLD AUTO: 2.49 M/UL (ref 4–5.4)
SODIUM SERPL-SCNC: 129 MMOL/L (ref 136–145)
WBC # BLD AUTO: 4.91 K/UL (ref 3.9–12.7)

## 2021-05-05 PROCEDURE — 36415 COLL VENOUS BLD VENIPUNCTURE: CPT | Performed by: INTERNAL MEDICINE

## 2021-05-05 PROCEDURE — 86580 TB INTRADERMAL TEST: CPT | Performed by: HOSPITALIST

## 2021-05-05 PROCEDURE — 84100 ASSAY OF PHOSPHORUS: CPT | Performed by: INTERNAL MEDICINE

## 2021-05-05 PROCEDURE — 25000003 PHARM REV CODE 250: Performed by: STUDENT IN AN ORGANIZED HEALTH CARE EDUCATION/TRAINING PROGRAM

## 2021-05-05 PROCEDURE — 99232 PR SUBSEQUENT HOSPITAL CARE,LEVL II: ICD-10-PCS | Mod: ,,, | Performed by: NURSE PRACTITIONER

## 2021-05-05 PROCEDURE — 94761 N-INVAS EAR/PLS OXIMETRY MLT: CPT

## 2021-05-05 PROCEDURE — 99232 PR SUBSEQUENT HOSPITAL CARE,LEVL II: ICD-10-PCS | Mod: ,,, | Performed by: SURGERY

## 2021-05-05 PROCEDURE — 25000003 PHARM REV CODE 250: Performed by: HOSPITALIST

## 2021-05-05 PROCEDURE — 11000001 HC ACUTE MED/SURG PRIVATE ROOM

## 2021-05-05 PROCEDURE — 63600175 PHARM REV CODE 636 W HCPCS: Performed by: STUDENT IN AN ORGANIZED HEALTH CARE EDUCATION/TRAINING PROGRAM

## 2021-05-05 PROCEDURE — 97530 THERAPEUTIC ACTIVITIES: CPT

## 2021-05-05 PROCEDURE — 30200315 PPD INTRADERMAL TEST REV CODE 302: Performed by: HOSPITALIST

## 2021-05-05 PROCEDURE — 99232 SBSQ HOSP IP/OBS MODERATE 35: CPT | Mod: ,,, | Performed by: NURSE PRACTITIONER

## 2021-05-05 PROCEDURE — 25000003 PHARM REV CODE 250: Performed by: INTERNAL MEDICINE

## 2021-05-05 PROCEDURE — 27000221 HC OXYGEN, UP TO 24 HOURS

## 2021-05-05 PROCEDURE — 99900035 HC TECH TIME PER 15 MIN (STAT)

## 2021-05-05 PROCEDURE — 99232 PR SUBSEQUENT HOSPITAL CARE,LEVL II: ICD-10-PCS | Mod: GC,,, | Performed by: HOSPITALIST

## 2021-05-05 PROCEDURE — 99232 SBSQ HOSP IP/OBS MODERATE 35: CPT | Mod: GC,,, | Performed by: HOSPITALIST

## 2021-05-05 PROCEDURE — 99232 SBSQ HOSP IP/OBS MODERATE 35: CPT | Mod: ,,, | Performed by: SURGERY

## 2021-05-05 PROCEDURE — 63600175 PHARM REV CODE 636 W HCPCS: Performed by: HOSPITALIST

## 2021-05-05 PROCEDURE — 85027 COMPLETE CBC AUTOMATED: CPT | Performed by: INTERNAL MEDICINE

## 2021-05-05 PROCEDURE — 97535 SELF CARE MNGMENT TRAINING: CPT

## 2021-05-05 PROCEDURE — 83735 ASSAY OF MAGNESIUM: CPT | Performed by: HOSPITALIST

## 2021-05-05 PROCEDURE — 80053 COMPREHEN METABOLIC PANEL: CPT | Performed by: INTERNAL MEDICINE

## 2021-05-05 RX ORDER — METOPROLOL SUCCINATE 50 MG/1
50 TABLET, EXTENDED RELEASE ORAL DAILY
Status: DISCONTINUED | OUTPATIENT
Start: 2021-05-05 | End: 2021-05-10 | Stop reason: HOSPADM

## 2021-05-05 RX ORDER — ENOXAPARIN SODIUM 100 MG/ML
40 INJECTION SUBCUTANEOUS EVERY 24 HOURS
Status: DISCONTINUED | OUTPATIENT
Start: 2021-05-05 | End: 2021-05-10 | Stop reason: HOSPADM

## 2021-05-05 RX ORDER — HYDROXYZINE HYDROCHLORIDE 25 MG/1
25 TABLET, FILM COATED ORAL 3 TIMES DAILY PRN
Status: DISCONTINUED | OUTPATIENT
Start: 2021-05-05 | End: 2021-05-10 | Stop reason: HOSPADM

## 2021-05-05 RX ORDER — MAGNESIUM SULFATE HEPTAHYDRATE 40 MG/ML
2 INJECTION, SOLUTION INTRAVENOUS ONCE
Status: COMPLETED | OUTPATIENT
Start: 2021-05-05 | End: 2021-05-05

## 2021-05-05 RX ADMIN — HYDROXYZINE HYDROCHLORIDE 25 MG: 25 TABLET, FILM COATED ORAL at 05:05

## 2021-05-05 RX ADMIN — MUPIROCIN: 20 OINTMENT TOPICAL at 10:05

## 2021-05-05 RX ADMIN — TUBERCULIN PURIFIED PROTEIN DERIVATIVE 5 UNITS: 5 INJECTION, SOLUTION INTRADERMAL at 02:05

## 2021-05-05 RX ADMIN — Medication 200 MG: at 09:05

## 2021-05-05 RX ADMIN — ASPIRIN 81 MG CHEWABLE TABLET 81 MG: 81 TABLET CHEWABLE at 10:05

## 2021-05-05 RX ADMIN — METOPROLOL SUCCINATE 50 MG: 50 TABLET, EXTENDED RELEASE ORAL at 02:05

## 2021-05-05 RX ADMIN — CARVEDILOL 3.12 MG: 3.12 TABLET, FILM COATED ORAL at 10:05

## 2021-05-05 RX ADMIN — MAGNESIUM SULFATE 2 G: 2 INJECTION INTRAVENOUS at 02:05

## 2021-05-05 RX ADMIN — LOSARTAN POTASSIUM 25 MG: 25 TABLET, FILM COATED ORAL at 10:05

## 2021-05-05 RX ADMIN — HYDROCODONE BITARTRATE AND ACETAMINOPHEN 1 TABLET: 5; 325 TABLET ORAL at 02:05

## 2021-05-05 RX ADMIN — TORSEMIDE 40 MG: 20 TABLET ORAL at 10:05

## 2021-05-05 RX ADMIN — AMOXICILLIN AND CLAVULANATE POTASSIUM 1 TABLET: 875; 125 TABLET, FILM COATED ORAL at 10:05

## 2021-05-05 RX ADMIN — ATORVASTATIN CALCIUM 40 MG: 20 TABLET, FILM COATED ORAL at 09:05

## 2021-05-05 RX ADMIN — Medication 200 MG: at 10:05

## 2021-05-05 RX ADMIN — HYDROCODONE BITARTRATE AND ACETAMINOPHEN 1 TABLET: 5; 325 TABLET ORAL at 03:05

## 2021-05-05 RX ADMIN — Medication 1000 UNITS: at 10:05

## 2021-05-05 RX ADMIN — HYDROCODONE BITARTRATE AND ACETAMINOPHEN 1 TABLET: 5; 325 TABLET ORAL at 09:05

## 2021-05-05 RX ADMIN — ENOXAPARIN SODIUM 40 MG: 40 INJECTION SUBCUTANEOUS at 05:05

## 2021-05-05 RX ADMIN — ACETAMINOPHEN 650 MG: 325 TABLET ORAL at 05:05

## 2021-05-05 RX ADMIN — PANTOPRAZOLE SODIUM 40 MG: 40 TABLET, DELAYED RELEASE ORAL at 10:05

## 2021-05-06 PROBLEM — I50.42 CHRONIC COMBINED SYSTOLIC AND DIASTOLIC CHF (CONGESTIVE HEART FAILURE): Chronic | Status: ACTIVE | Noted: 2018-09-17

## 2021-05-06 LAB
ABO + RH BLD: NORMAL
ALBUMIN SERPL BCP-MCNC: 1.8 G/DL (ref 3.5–5.2)
ALP SERPL-CCNC: 200 U/L (ref 55–135)
ALT SERPL W/O P-5'-P-CCNC: 32 U/L (ref 10–44)
ANION GAP SERPL CALC-SCNC: 9 MMOL/L (ref 8–16)
AST SERPL-CCNC: 44 U/L (ref 10–40)
BASOPHILS # BLD AUTO: 0.03 K/UL (ref 0–0.2)
BASOPHILS NFR BLD: 0.5 % (ref 0–1.9)
BILIRUB SERPL-MCNC: 0.6 MG/DL (ref 0.1–1)
BILIRUB SERPL-MCNC: 0.6 MG/DL (ref 0.1–1)
BLD GP AB SCN CELLS X3 SERPL QL: NORMAL
BLD PROD TYP BPU: NORMAL
BLOOD UNIT EXPIRATION DATE: NORMAL
BLOOD UNIT TYPE CODE: 5100
BLOOD UNIT TYPE: NORMAL
BUN SERPL-MCNC: 33 MG/DL (ref 8–23)
CALCIUM SERPL-MCNC: 8.2 MG/DL (ref 8.7–10.5)
CHLORIDE SERPL-SCNC: 94 MMOL/L (ref 95–110)
CO2 SERPL-SCNC: 26 MMOL/L (ref 23–29)
CODING SYSTEM: NORMAL
CREAT SERPL-MCNC: 1 MG/DL (ref 0.5–1.4)
DIFFERENTIAL METHOD: ABNORMAL
DISPENSE STATUS: NORMAL
EOSINOPHIL # BLD AUTO: 0.1 K/UL (ref 0–0.5)
EOSINOPHIL NFR BLD: 1.5 % (ref 0–8)
ERYTHROCYTE [DISTWIDTH] IN BLOOD BY AUTOMATED COUNT: 16.5 % (ref 11.5–14.5)
ERYTHROCYTE [DISTWIDTH] IN BLOOD BY AUTOMATED COUNT: 17.3 % (ref 11.5–14.5)
EST. GFR  (AFRICAN AMERICAN): >60 ML/MIN/1.73 M^2
EST. GFR  (NON AFRICAN AMERICAN): 56.8 ML/MIN/1.73 M^2
GLUCOSE SERPL-MCNC: 121 MG/DL (ref 70–110)
HAPTOGLOB SERPL-MCNC: 253 MG/DL (ref 30–250)
HCT VFR BLD AUTO: 20.1 % (ref 37–48.5)
HCT VFR BLD AUTO: 22.7 % (ref 37–48.5)
HGB BLD-MCNC: 6.8 G/DL (ref 12–16)
HGB BLD-MCNC: 8 G/DL (ref 12–16)
IMM GRANULOCYTES # BLD AUTO: 0.13 K/UL (ref 0–0.04)
IMM GRANULOCYTES NFR BLD AUTO: 2 % (ref 0–0.5)
LDH SERPL L TO P-CCNC: 172 U/L (ref 110–260)
LYMPHOCYTES # BLD AUTO: 0.9 K/UL (ref 1–4.8)
LYMPHOCYTES NFR BLD: 14 % (ref 18–48)
MAGNESIUM SERPL-MCNC: 1.4 MG/DL (ref 1.6–2.6)
MCH RBC QN AUTO: 29.7 PG (ref 27–31)
MCH RBC QN AUTO: 30.2 PG (ref 27–31)
MCHC RBC AUTO-ENTMCNC: 33.8 G/DL (ref 32–36)
MCHC RBC AUTO-ENTMCNC: 35.2 G/DL (ref 32–36)
MCV RBC AUTO: 86 FL (ref 82–98)
MCV RBC AUTO: 88 FL (ref 82–98)
MONOCYTES # BLD AUTO: 0.8 K/UL (ref 0.3–1)
MONOCYTES NFR BLD: 12.5 % (ref 4–15)
NEUTROPHILS # BLD AUTO: 4.6 K/UL (ref 1.8–7.7)
NEUTROPHILS NFR BLD: 69.5 % (ref 38–73)
NRBC BLD-RTO: 0 /100 WBC
NUM UNITS TRANS PACKED RBC: NORMAL
PHOSPHATE SERPL-MCNC: 5.6 MG/DL (ref 2.7–4.5)
PLATELET # BLD AUTO: 181 K/UL (ref 150–450)
PLATELET # BLD AUTO: 215 K/UL (ref 150–450)
PMV BLD AUTO: 10.8 FL (ref 9.2–12.9)
PMV BLD AUTO: 12 FL (ref 9.2–12.9)
POTASSIUM SERPL-SCNC: 3.5 MMOL/L (ref 3.5–5.1)
PROT SERPL-MCNC: 4.9 G/DL (ref 6–8.4)
RBC # BLD AUTO: 2.29 M/UL (ref 4–5.4)
RBC # BLD AUTO: 2.65 M/UL (ref 4–5.4)
SODIUM SERPL-SCNC: 129 MMOL/L (ref 136–145)
WBC # BLD AUTO: 5.25 K/UL (ref 3.9–12.7)
WBC # BLD AUTO: 6.65 K/UL (ref 3.9–12.7)

## 2021-05-06 PROCEDURE — 11000001 HC ACUTE MED/SURG PRIVATE ROOM

## 2021-05-06 PROCEDURE — 83010 ASSAY OF HAPTOGLOBIN QUANT: CPT | Performed by: STUDENT IN AN ORGANIZED HEALTH CARE EDUCATION/TRAINING PROGRAM

## 2021-05-06 PROCEDURE — 63600175 PHARM REV CODE 636 W HCPCS: Performed by: STUDENT IN AN ORGANIZED HEALTH CARE EDUCATION/TRAINING PROGRAM

## 2021-05-06 PROCEDURE — 84100 ASSAY OF PHOSPHORUS: CPT | Performed by: INTERNAL MEDICINE

## 2021-05-06 PROCEDURE — 99900035 HC TECH TIME PER 15 MIN (STAT)

## 2021-05-06 PROCEDURE — 36430 TRANSFUSION BLD/BLD COMPNT: CPT

## 2021-05-06 PROCEDURE — 83615 LACTATE (LD) (LDH) ENZYME: CPT | Performed by: STUDENT IN AN ORGANIZED HEALTH CARE EDUCATION/TRAINING PROGRAM

## 2021-05-06 PROCEDURE — 25000003 PHARM REV CODE 250: Performed by: STUDENT IN AN ORGANIZED HEALTH CARE EDUCATION/TRAINING PROGRAM

## 2021-05-06 PROCEDURE — P9016 RBC LEUKOCYTES REDUCED: HCPCS | Performed by: STUDENT IN AN ORGANIZED HEALTH CARE EDUCATION/TRAINING PROGRAM

## 2021-05-06 PROCEDURE — 27000221 HC OXYGEN, UP TO 24 HOURS

## 2021-05-06 PROCEDURE — 36415 COLL VENOUS BLD VENIPUNCTURE: CPT | Performed by: STUDENT IN AN ORGANIZED HEALTH CARE EDUCATION/TRAINING PROGRAM

## 2021-05-06 PROCEDURE — 86920 COMPATIBILITY TEST SPIN: CPT | Performed by: STUDENT IN AN ORGANIZED HEALTH CARE EDUCATION/TRAINING PROGRAM

## 2021-05-06 PROCEDURE — 94761 N-INVAS EAR/PLS OXIMETRY MLT: CPT

## 2021-05-06 PROCEDURE — 99231 SBSQ HOSP IP/OBS SF/LOW 25: CPT | Mod: GC,,, | Performed by: STUDENT IN AN ORGANIZED HEALTH CARE EDUCATION/TRAINING PROGRAM

## 2021-05-06 PROCEDURE — 99231 PR SUBSEQUENT HOSPITAL CARE,LEVL I: ICD-10-PCS | Mod: GC,,, | Performed by: STUDENT IN AN ORGANIZED HEALTH CARE EDUCATION/TRAINING PROGRAM

## 2021-05-06 PROCEDURE — 85025 COMPLETE CBC W/AUTO DIFF WBC: CPT | Performed by: STUDENT IN AN ORGANIZED HEALTH CARE EDUCATION/TRAINING PROGRAM

## 2021-05-06 PROCEDURE — 85027 COMPLETE CBC AUTOMATED: CPT | Performed by: INTERNAL MEDICINE

## 2021-05-06 PROCEDURE — 82247 BILIRUBIN TOTAL: CPT | Performed by: STUDENT IN AN ORGANIZED HEALTH CARE EDUCATION/TRAINING PROGRAM

## 2021-05-06 PROCEDURE — 86900 BLOOD TYPING SEROLOGIC ABO: CPT | Performed by: STUDENT IN AN ORGANIZED HEALTH CARE EDUCATION/TRAINING PROGRAM

## 2021-05-06 PROCEDURE — 25000003 PHARM REV CODE 250: Performed by: INTERNAL MEDICINE

## 2021-05-06 PROCEDURE — 25000003 PHARM REV CODE 250: Performed by: HOSPITALIST

## 2021-05-06 PROCEDURE — 83735 ASSAY OF MAGNESIUM: CPT | Performed by: HOSPITALIST

## 2021-05-06 PROCEDURE — 80053 COMPREHEN METABOLIC PANEL: CPT | Performed by: INTERNAL MEDICINE

## 2021-05-06 RX ORDER — MAGNESIUM SULFATE HEPTAHYDRATE 40 MG/ML
2 INJECTION, SOLUTION INTRAVENOUS ONCE
Status: COMPLETED | OUTPATIENT
Start: 2021-05-06 | End: 2021-05-06

## 2021-05-06 RX ORDER — NAPROXEN SODIUM 220 MG/1
81 TABLET, FILM COATED ORAL DAILY
Qty: 90 TABLET | Refills: 3 | Status: SHIPPED | OUTPATIENT
Start: 2021-05-06 | End: 2021-05-07

## 2021-05-06 RX ORDER — HYDROCODONE BITARTRATE AND ACETAMINOPHEN 500; 5 MG/1; MG/1
TABLET ORAL
Status: DISCONTINUED | OUTPATIENT
Start: 2021-05-06 | End: 2021-05-10 | Stop reason: HOSPADM

## 2021-05-06 RX ORDER — SODIUM CHLORIDE 9 MG/ML
INJECTION, SOLUTION INTRAVENOUS
Status: DISCONTINUED | OUTPATIENT
Start: 2021-05-06 | End: 2021-05-06

## 2021-05-06 RX ORDER — LOSARTAN POTASSIUM 25 MG/1
25 TABLET ORAL DAILY
Qty: 90 TABLET | Refills: 3 | Status: SHIPPED | OUTPATIENT
Start: 2021-05-06 | End: 2021-06-17 | Stop reason: SDUPTHER

## 2021-05-06 RX ORDER — METOPROLOL SUCCINATE 50 MG/1
50 TABLET, EXTENDED RELEASE ORAL DAILY
Qty: 90 TABLET | Refills: 3 | Status: SHIPPED | OUTPATIENT
Start: 2021-05-06 | End: 2021-05-07

## 2021-05-06 RX ADMIN — Medication 200 MG: at 10:05

## 2021-05-06 RX ADMIN — POTASSIUM BICARBONATE 50 MEQ: 978 TABLET, EFFERVESCENT ORAL at 04:05

## 2021-05-06 RX ADMIN — TORSEMIDE 40 MG: 20 TABLET ORAL at 10:05

## 2021-05-06 RX ADMIN — Medication 1000 UNITS: at 10:05

## 2021-05-06 RX ADMIN — PANTOPRAZOLE SODIUM 40 MG: 40 TABLET, DELAYED RELEASE ORAL at 10:05

## 2021-05-06 RX ADMIN — ENOXAPARIN SODIUM 40 MG: 40 INJECTION SUBCUTANEOUS at 04:05

## 2021-05-06 RX ADMIN — HYDROCODONE BITARTRATE AND ACETAMINOPHEN 1 TABLET: 5; 325 TABLET ORAL at 04:05

## 2021-05-06 RX ADMIN — ASPIRIN 81 MG CHEWABLE TABLET 81 MG: 81 TABLET CHEWABLE at 10:05

## 2021-05-06 RX ADMIN — LOSARTAN POTASSIUM 25 MG: 25 TABLET, FILM COATED ORAL at 10:05

## 2021-05-06 RX ADMIN — MAGNESIUM SULFATE 2 G: 2 INJECTION INTRAVENOUS at 04:05

## 2021-05-06 RX ADMIN — Medication 200 MG: at 09:05

## 2021-05-06 RX ADMIN — HYDROCODONE BITARTRATE AND ACETAMINOPHEN 1 TABLET: 5; 325 TABLET ORAL at 09:05

## 2021-05-06 RX ADMIN — ACETAMINOPHEN 650 MG: 325 TABLET ORAL at 03:05

## 2021-05-06 RX ADMIN — HYDROXYZINE HYDROCHLORIDE 25 MG: 25 TABLET, FILM COATED ORAL at 12:05

## 2021-05-06 RX ADMIN — METOPROLOL SUCCINATE 50 MG: 50 TABLET, EXTENDED RELEASE ORAL at 10:05

## 2021-05-06 RX ADMIN — ATORVASTATIN CALCIUM 40 MG: 20 TABLET, FILM COATED ORAL at 09:05

## 2021-05-06 RX ADMIN — SODIUM CHLORIDE: 0.9 INJECTION, SOLUTION INTRAVENOUS at 12:05

## 2021-05-06 RX ADMIN — HYDROCODONE BITARTRATE AND ACETAMINOPHEN 1 TABLET: 5; 325 TABLET ORAL at 03:05

## 2021-05-07 LAB
ALBUMIN SERPL BCP-MCNC: 1.9 G/DL (ref 3.5–5.2)
ALP SERPL-CCNC: 200 U/L (ref 55–135)
ALT SERPL W/O P-5'-P-CCNC: 41 U/L (ref 10–44)
ANION GAP SERPL CALC-SCNC: 7 MMOL/L (ref 8–16)
AST SERPL-CCNC: 58 U/L (ref 10–40)
BILIRUB SERPL-MCNC: 0.6 MG/DL (ref 0.1–1)
BUN SERPL-MCNC: 33 MG/DL (ref 8–23)
CALCIUM SERPL-MCNC: 8.2 MG/DL (ref 8.7–10.5)
CHLORIDE SERPL-SCNC: 98 MMOL/L (ref 95–110)
CO2 SERPL-SCNC: 27 MMOL/L (ref 23–29)
CREAT SERPL-MCNC: 0.9 MG/DL (ref 0.5–1.4)
ERYTHROCYTE [DISTWIDTH] IN BLOOD BY AUTOMATED COUNT: 17.1 % (ref 11.5–14.5)
EST. GFR  (AFRICAN AMERICAN): >60 ML/MIN/1.73 M^2
EST. GFR  (NON AFRICAN AMERICAN): >60 ML/MIN/1.73 M^2
GLUCOSE SERPL-MCNC: 91 MG/DL (ref 70–110)
HCT VFR BLD AUTO: 22.3 % (ref 37–48.5)
HGB BLD-MCNC: 7.7 G/DL (ref 12–16)
MAGNESIUM SERPL-MCNC: 1.4 MG/DL (ref 1.6–2.6)
MCH RBC QN AUTO: 30.3 PG (ref 27–31)
MCHC RBC AUTO-ENTMCNC: 34.5 G/DL (ref 32–36)
MCV RBC AUTO: 88 FL (ref 82–98)
OB PNL STL: NEGATIVE
PHOSPHATE SERPL-MCNC: 4.5 MG/DL (ref 2.7–4.5)
PLATELET # BLD AUTO: 210 K/UL (ref 150–450)
PMV BLD AUTO: 10.9 FL (ref 9.2–12.9)
POTASSIUM SERPL-SCNC: 3.9 MMOL/L (ref 3.5–5.1)
PROT SERPL-MCNC: 5.4 G/DL (ref 6–8.4)
RBC # BLD AUTO: 2.54 M/UL (ref 4–5.4)
SODIUM SERPL-SCNC: 132 MMOL/L (ref 136–145)
WBC # BLD AUTO: 5.4 K/UL (ref 3.9–12.7)

## 2021-05-07 PROCEDURE — 11000001 HC ACUTE MED/SURG PRIVATE ROOM

## 2021-05-07 PROCEDURE — 80053 COMPREHEN METABOLIC PANEL: CPT | Performed by: INTERNAL MEDICINE

## 2021-05-07 PROCEDURE — 99231 SBSQ HOSP IP/OBS SF/LOW 25: CPT | Mod: GC,,, | Performed by: STUDENT IN AN ORGANIZED HEALTH CARE EDUCATION/TRAINING PROGRAM

## 2021-05-07 PROCEDURE — 25000003 PHARM REV CODE 250: Performed by: STUDENT IN AN ORGANIZED HEALTH CARE EDUCATION/TRAINING PROGRAM

## 2021-05-07 PROCEDURE — 83735 ASSAY OF MAGNESIUM: CPT | Performed by: HOSPITALIST

## 2021-05-07 PROCEDURE — 25000003 PHARM REV CODE 250: Performed by: INTERNAL MEDICINE

## 2021-05-07 PROCEDURE — 84100 ASSAY OF PHOSPHORUS: CPT | Performed by: INTERNAL MEDICINE

## 2021-05-07 PROCEDURE — 99231 PR SUBSEQUENT HOSPITAL CARE,LEVL I: ICD-10-PCS | Mod: GC,,, | Performed by: STUDENT IN AN ORGANIZED HEALTH CARE EDUCATION/TRAINING PROGRAM

## 2021-05-07 PROCEDURE — 85027 COMPLETE CBC AUTOMATED: CPT | Performed by: INTERNAL MEDICINE

## 2021-05-07 PROCEDURE — 63600175 PHARM REV CODE 636 W HCPCS: Performed by: STUDENT IN AN ORGANIZED HEALTH CARE EDUCATION/TRAINING PROGRAM

## 2021-05-07 PROCEDURE — 82272 OCCULT BLD FECES 1-3 TESTS: CPT | Performed by: STUDENT IN AN ORGANIZED HEALTH CARE EDUCATION/TRAINING PROGRAM

## 2021-05-07 PROCEDURE — 25000003 PHARM REV CODE 250: Performed by: HOSPITALIST

## 2021-05-07 RX ORDER — NAPROXEN SODIUM 220 MG/1
81 TABLET, FILM COATED ORAL DAILY
Qty: 90 TABLET | Refills: 3 | Status: ON HOLD
Start: 2021-05-07 | End: 2021-09-07 | Stop reason: SDUPTHER

## 2021-05-07 RX ORDER — HYDROCODONE BITARTRATE AND ACETAMINOPHEN 10; 325 MG/1; MG/1
1 TABLET ORAL EVERY 12 HOURS
Qty: 14 TABLET | Refills: 0 | Status: SHIPPED | OUTPATIENT
Start: 2021-05-07 | End: 2021-05-10 | Stop reason: HOSPADM

## 2021-05-07 RX ORDER — MAGNESIUM SULFATE HEPTAHYDRATE 40 MG/ML
2 INJECTION, SOLUTION INTRAVENOUS ONCE
Status: COMPLETED | OUTPATIENT
Start: 2021-05-07 | End: 2021-05-07

## 2021-05-07 RX ORDER — TORSEMIDE 20 MG/1
40 TABLET ORAL DAILY
Qty: 60 TABLET | Refills: 11 | Status: ON HOLD
Start: 2021-05-08 | End: 2021-09-07 | Stop reason: HOSPADM

## 2021-05-07 RX ORDER — POLYETHYLENE GLYCOL 3350 17 G/17G
17 POWDER, FOR SOLUTION ORAL DAILY PRN
Status: DISCONTINUED | OUTPATIENT
Start: 2021-05-07 | End: 2021-05-10 | Stop reason: HOSPADM

## 2021-05-07 RX ORDER — METOPROLOL SUCCINATE 50 MG/1
50 TABLET, EXTENDED RELEASE ORAL DAILY
Qty: 90 TABLET | Refills: 3
Start: 2021-05-07 | End: 2021-06-17 | Stop reason: SDUPTHER

## 2021-05-07 RX ADMIN — Medication 200 MG: at 11:05

## 2021-05-07 RX ADMIN — HYDROCODONE BITARTRATE AND ACETAMINOPHEN 1 TABLET: 5; 325 TABLET ORAL at 02:05

## 2021-05-07 RX ADMIN — TORSEMIDE 40 MG: 20 TABLET ORAL at 09:05

## 2021-05-07 RX ADMIN — ENOXAPARIN SODIUM 40 MG: 40 INJECTION SUBCUTANEOUS at 04:05

## 2021-05-07 RX ADMIN — Medication 200 MG: at 09:05

## 2021-05-07 RX ADMIN — ACETAMINOPHEN 650 MG: 325 TABLET ORAL at 01:05

## 2021-05-07 RX ADMIN — ASPIRIN 81 MG CHEWABLE TABLET 81 MG: 81 TABLET CHEWABLE at 09:05

## 2021-05-07 RX ADMIN — HYDROCODONE BITARTRATE AND ACETAMINOPHEN 1 TABLET: 5; 325 TABLET ORAL at 07:05

## 2021-05-07 RX ADMIN — PANTOPRAZOLE SODIUM 40 MG: 40 TABLET, DELAYED RELEASE ORAL at 09:05

## 2021-05-07 RX ADMIN — Medication 1000 UNITS: at 09:05

## 2021-05-07 RX ADMIN — LOSARTAN POTASSIUM 25 MG: 25 TABLET, FILM COATED ORAL at 09:05

## 2021-05-07 RX ADMIN — HYDROCODONE BITARTRATE AND ACETAMINOPHEN 1 TABLET: 5; 325 TABLET ORAL at 09:05

## 2021-05-07 RX ADMIN — METOPROLOL SUCCINATE 50 MG: 50 TABLET, EXTENDED RELEASE ORAL at 09:05

## 2021-05-07 RX ADMIN — ATORVASTATIN CALCIUM 40 MG: 20 TABLET, FILM COATED ORAL at 09:05

## 2021-05-07 RX ADMIN — MAGNESIUM SULFATE 2 G: 2 INJECTION INTRAVENOUS at 04:05

## 2021-05-07 RX ADMIN — HYDROXYZINE HYDROCHLORIDE 25 MG: 25 TABLET, FILM COATED ORAL at 09:05

## 2021-05-08 LAB
ALBUMIN SERPL BCP-MCNC: 2 G/DL (ref 3.5–5.2)
ALP SERPL-CCNC: 199 U/L (ref 55–135)
ALT SERPL W/O P-5'-P-CCNC: 45 U/L (ref 10–44)
ANION GAP SERPL CALC-SCNC: 9 MMOL/L (ref 8–16)
AST SERPL-CCNC: 61 U/L (ref 10–40)
BILIRUB SERPL-MCNC: 0.5 MG/DL (ref 0.1–1)
BUN SERPL-MCNC: 32 MG/DL (ref 8–23)
CALCIUM SERPL-MCNC: 7.9 MG/DL (ref 8.7–10.5)
CHLORIDE SERPL-SCNC: 97 MMOL/L (ref 95–110)
CO2 SERPL-SCNC: 27 MMOL/L (ref 23–29)
CREAT SERPL-MCNC: 1 MG/DL (ref 0.5–1.4)
ERYTHROCYTE [DISTWIDTH] IN BLOOD BY AUTOMATED COUNT: 17.2 % (ref 11.5–14.5)
EST. GFR  (AFRICAN AMERICAN): >60 ML/MIN/1.73 M^2
EST. GFR  (NON AFRICAN AMERICAN): 56.8 ML/MIN/1.73 M^2
GLUCOSE SERPL-MCNC: 86 MG/DL (ref 70–110)
HCT VFR BLD AUTO: 24.5 % (ref 37–48.5)
HGB BLD-MCNC: 8.1 G/DL (ref 12–16)
MAGNESIUM SERPL-MCNC: 1.5 MG/DL (ref 1.6–2.6)
MCH RBC QN AUTO: 30.9 PG (ref 27–31)
MCHC RBC AUTO-ENTMCNC: 33.1 G/DL (ref 32–36)
MCV RBC AUTO: 94 FL (ref 82–98)
PHOSPHATE SERPL-MCNC: 4 MG/DL (ref 2.7–4.5)
PLATELET # BLD AUTO: 243 K/UL (ref 150–450)
PMV BLD AUTO: 11.1 FL (ref 9.2–12.9)
POTASSIUM SERPL-SCNC: 4.2 MMOL/L (ref 3.5–5.1)
PROT SERPL-MCNC: 5.6 G/DL (ref 6–8.4)
RBC # BLD AUTO: 2.62 M/UL (ref 4–5.4)
SODIUM SERPL-SCNC: 133 MMOL/L (ref 136–145)
WBC # BLD AUTO: 4.91 K/UL (ref 3.9–12.7)

## 2021-05-08 PROCEDURE — 25000003 PHARM REV CODE 250: Performed by: INTERNAL MEDICINE

## 2021-05-08 PROCEDURE — 11000001 HC ACUTE MED/SURG PRIVATE ROOM

## 2021-05-08 PROCEDURE — 99231 SBSQ HOSP IP/OBS SF/LOW 25: CPT | Mod: GC,,, | Performed by: STUDENT IN AN ORGANIZED HEALTH CARE EDUCATION/TRAINING PROGRAM

## 2021-05-08 PROCEDURE — 63600175 PHARM REV CODE 636 W HCPCS: Performed by: STUDENT IN AN ORGANIZED HEALTH CARE EDUCATION/TRAINING PROGRAM

## 2021-05-08 PROCEDURE — 99231 PR SUBSEQUENT HOSPITAL CARE,LEVL I: ICD-10-PCS | Mod: GC,,, | Performed by: STUDENT IN AN ORGANIZED HEALTH CARE EDUCATION/TRAINING PROGRAM

## 2021-05-08 PROCEDURE — 25000003 PHARM REV CODE 250: Performed by: HOSPITALIST

## 2021-05-08 PROCEDURE — 83735 ASSAY OF MAGNESIUM: CPT | Performed by: HOSPITALIST

## 2021-05-08 PROCEDURE — 85027 COMPLETE CBC AUTOMATED: CPT | Performed by: INTERNAL MEDICINE

## 2021-05-08 PROCEDURE — 25000003 PHARM REV CODE 250: Performed by: STUDENT IN AN ORGANIZED HEALTH CARE EDUCATION/TRAINING PROGRAM

## 2021-05-08 PROCEDURE — 84100 ASSAY OF PHOSPHORUS: CPT | Performed by: INTERNAL MEDICINE

## 2021-05-08 PROCEDURE — 80053 COMPREHEN METABOLIC PANEL: CPT | Performed by: INTERNAL MEDICINE

## 2021-05-08 PROCEDURE — 36415 COLL VENOUS BLD VENIPUNCTURE: CPT | Performed by: HOSPITALIST

## 2021-05-08 RX ORDER — MAGNESIUM SULFATE HEPTAHYDRATE 40 MG/ML
2 INJECTION, SOLUTION INTRAVENOUS ONCE
Status: COMPLETED | OUTPATIENT
Start: 2021-05-08 | End: 2021-05-08

## 2021-05-08 RX ORDER — MAGNESIUM SULFATE HEPTAHYDRATE 40 MG/ML
2 INJECTION, SOLUTION INTRAVENOUS ONCE
Status: DISCONTINUED | OUTPATIENT
Start: 2021-05-08 | End: 2021-05-08

## 2021-05-08 RX ADMIN — ASPIRIN 81 MG CHEWABLE TABLET 81 MG: 81 TABLET CHEWABLE at 09:05

## 2021-05-08 RX ADMIN — Medication 200 MG: at 09:05

## 2021-05-08 RX ADMIN — PANTOPRAZOLE SODIUM 40 MG: 40 TABLET, DELAYED RELEASE ORAL at 09:05

## 2021-05-08 RX ADMIN — ATORVASTATIN CALCIUM 40 MG: 20 TABLET, FILM COATED ORAL at 08:05

## 2021-05-08 RX ADMIN — HYDROCODONE BITARTRATE AND ACETAMINOPHEN 1 TABLET: 5; 325 TABLET ORAL at 04:05

## 2021-05-08 RX ADMIN — MAGNESIUM SULFATE 2 G: 2 INJECTION INTRAVENOUS at 11:05

## 2021-05-08 RX ADMIN — HYDROXYZINE HYDROCHLORIDE 25 MG: 25 TABLET, FILM COATED ORAL at 04:05

## 2021-05-08 RX ADMIN — HYDROCODONE BITARTRATE AND ACETAMINOPHEN 1 TABLET: 5; 325 TABLET ORAL at 08:05

## 2021-05-08 RX ADMIN — HYDROCODONE BITARTRATE AND ACETAMINOPHEN 1 TABLET: 5; 325 TABLET ORAL at 11:05

## 2021-05-08 RX ADMIN — METOPROLOL SUCCINATE 50 MG: 50 TABLET, EXTENDED RELEASE ORAL at 09:05

## 2021-05-08 RX ADMIN — Medication 1000 UNITS: at 09:05

## 2021-05-08 RX ADMIN — HYDROXYZINE HYDROCHLORIDE 25 MG: 25 TABLET, FILM COATED ORAL at 11:05

## 2021-05-08 RX ADMIN — TORSEMIDE 40 MG: 20 TABLET ORAL at 09:05

## 2021-05-08 RX ADMIN — ENOXAPARIN SODIUM 40 MG: 40 INJECTION SUBCUTANEOUS at 05:05

## 2021-05-09 PROCEDURE — 25000003 PHARM REV CODE 250: Performed by: HOSPITALIST

## 2021-05-09 PROCEDURE — 25000003 PHARM REV CODE 250: Performed by: STUDENT IN AN ORGANIZED HEALTH CARE EDUCATION/TRAINING PROGRAM

## 2021-05-09 PROCEDURE — 25000003 PHARM REV CODE 250: Performed by: INTERNAL MEDICINE

## 2021-05-09 PROCEDURE — 99231 SBSQ HOSP IP/OBS SF/LOW 25: CPT | Mod: GC,,, | Performed by: STUDENT IN AN ORGANIZED HEALTH CARE EDUCATION/TRAINING PROGRAM

## 2021-05-09 PROCEDURE — 11000001 HC ACUTE MED/SURG PRIVATE ROOM

## 2021-05-09 PROCEDURE — 99231 PR SUBSEQUENT HOSPITAL CARE,LEVL I: ICD-10-PCS | Mod: GC,,, | Performed by: STUDENT IN AN ORGANIZED HEALTH CARE EDUCATION/TRAINING PROGRAM

## 2021-05-09 PROCEDURE — 63600175 PHARM REV CODE 636 W HCPCS: Performed by: STUDENT IN AN ORGANIZED HEALTH CARE EDUCATION/TRAINING PROGRAM

## 2021-05-09 RX ADMIN — HYDROCODONE BITARTRATE AND ACETAMINOPHEN 1 TABLET: 5; 325 TABLET ORAL at 12:05

## 2021-05-09 RX ADMIN — TORSEMIDE 40 MG: 20 TABLET ORAL at 09:05

## 2021-05-09 RX ADMIN — METOPROLOL SUCCINATE 50 MG: 50 TABLET, EXTENDED RELEASE ORAL at 09:05

## 2021-05-09 RX ADMIN — PANTOPRAZOLE SODIUM 40 MG: 40 TABLET, DELAYED RELEASE ORAL at 09:05

## 2021-05-09 RX ADMIN — HYDROCODONE BITARTRATE AND ACETAMINOPHEN 1 TABLET: 5; 325 TABLET ORAL at 09:05

## 2021-05-09 RX ADMIN — HYDROCODONE BITARTRATE AND ACETAMINOPHEN 1 TABLET: 5; 325 TABLET ORAL at 02:05

## 2021-05-09 RX ADMIN — ATORVASTATIN CALCIUM 40 MG: 20 TABLET, FILM COATED ORAL at 09:05

## 2021-05-09 RX ADMIN — ENOXAPARIN SODIUM 40 MG: 40 INJECTION SUBCUTANEOUS at 06:05

## 2021-05-09 RX ADMIN — Medication 200 MG: at 09:05

## 2021-05-09 RX ADMIN — DOCUSATE SODIUM 50MG AND SENNOSIDES 8.6MG 1 TABLET: 8.6; 5 TABLET, FILM COATED ORAL at 06:05

## 2021-05-09 RX ADMIN — HYDROXYZINE HYDROCHLORIDE 25 MG: 25 TABLET, FILM COATED ORAL at 05:05

## 2021-05-09 RX ADMIN — Medication 1000 UNITS: at 09:05

## 2021-05-09 RX ADMIN — ASPIRIN 81 MG CHEWABLE TABLET 81 MG: 81 TABLET CHEWABLE at 09:05

## 2021-05-10 ENCOUNTER — PATIENT MESSAGE (OUTPATIENT)
Dept: INTERNAL MEDICINE | Facility: CLINIC | Age: 71
End: 2021-05-10

## 2021-05-10 VITALS
OXYGEN SATURATION: 95 % | HEIGHT: 66 IN | TEMPERATURE: 98 F | WEIGHT: 215 LBS | DIASTOLIC BLOOD PRESSURE: 67 MMHG | RESPIRATION RATE: 20 BRPM | HEART RATE: 88 BPM | BODY MASS INDEX: 34.55 KG/M2 | SYSTOLIC BLOOD PRESSURE: 155 MMHG

## 2021-05-10 LAB
ALBUMIN SERPL BCP-MCNC: 1.9 G/DL (ref 3.5–5.2)
ALP SERPL-CCNC: 192 U/L (ref 55–135)
ALT SERPL W/O P-5'-P-CCNC: 44 U/L (ref 10–44)
ANION GAP SERPL CALC-SCNC: 9 MMOL/L (ref 8–16)
AST SERPL-CCNC: 51 U/L (ref 10–40)
BASOPHILS # BLD AUTO: 0.03 K/UL (ref 0–0.2)
BASOPHILS NFR BLD: 0.4 % (ref 0–1.9)
BILIRUB SERPL-MCNC: 0.5 MG/DL (ref 0.1–1)
BUN SERPL-MCNC: 26 MG/DL (ref 8–23)
CALCIUM SERPL-MCNC: 8.3 MG/DL (ref 8.7–10.5)
CHLORIDE SERPL-SCNC: 98 MMOL/L (ref 95–110)
CO2 SERPL-SCNC: 26 MMOL/L (ref 23–29)
CREAT SERPL-MCNC: 1 MG/DL (ref 0.5–1.4)
DIFFERENTIAL METHOD: ABNORMAL
EOSINOPHIL # BLD AUTO: 0.1 K/UL (ref 0–0.5)
EOSINOPHIL NFR BLD: 1.4 % (ref 0–8)
ERYTHROCYTE [DISTWIDTH] IN BLOOD BY AUTOMATED COUNT: 17.2 % (ref 11.5–14.5)
EST. GFR  (AFRICAN AMERICAN): >60 ML/MIN/1.73 M^2
EST. GFR  (NON AFRICAN AMERICAN): 56.8 ML/MIN/1.73 M^2
GLUCOSE SERPL-MCNC: 85 MG/DL (ref 70–110)
HCT VFR BLD AUTO: 22.8 % (ref 37–48.5)
HGB BLD-MCNC: 7.6 G/DL (ref 12–16)
IMM GRANULOCYTES # BLD AUTO: 0.04 K/UL (ref 0–0.04)
IMM GRANULOCYTES NFR BLD AUTO: 0.6 % (ref 0–0.5)
LYMPHOCYTES # BLD AUTO: 1.3 K/UL (ref 1–4.8)
LYMPHOCYTES NFR BLD: 18.1 % (ref 18–48)
MAGNESIUM SERPL-MCNC: 1.3 MG/DL (ref 1.6–2.6)
MCH RBC QN AUTO: 30 PG (ref 27–31)
MCHC RBC AUTO-ENTMCNC: 33.3 G/DL (ref 32–36)
MCV RBC AUTO: 90 FL (ref 82–98)
MONOCYTES # BLD AUTO: 0.8 K/UL (ref 0.3–1)
MONOCYTES NFR BLD: 11.3 % (ref 4–15)
NEUTROPHILS # BLD AUTO: 4.8 K/UL (ref 1.8–7.7)
NEUTROPHILS NFR BLD: 68.2 % (ref 38–73)
NRBC BLD-RTO: 0 /100 WBC
PLATELET # BLD AUTO: 243 K/UL (ref 150–450)
PMV BLD AUTO: 11 FL (ref 9.2–12.9)
POTASSIUM SERPL-SCNC: 4.4 MMOL/L (ref 3.5–5.1)
PROT SERPL-MCNC: 5.4 G/DL (ref 6–8.4)
RBC # BLD AUTO: 2.53 M/UL (ref 4–5.4)
SODIUM SERPL-SCNC: 133 MMOL/L (ref 136–145)
WBC # BLD AUTO: 6.97 K/UL (ref 3.9–12.7)

## 2021-05-10 PROCEDURE — 99239 PR HOSPITAL DISCHARGE DAY,>30 MIN: ICD-10-PCS | Mod: GC,,, | Performed by: STUDENT IN AN ORGANIZED HEALTH CARE EDUCATION/TRAINING PROGRAM

## 2021-05-10 PROCEDURE — 25000003 PHARM REV CODE 250: Performed by: STUDENT IN AN ORGANIZED HEALTH CARE EDUCATION/TRAINING PROGRAM

## 2021-05-10 PROCEDURE — 85025 COMPLETE CBC W/AUTO DIFF WBC: CPT | Performed by: STUDENT IN AN ORGANIZED HEALTH CARE EDUCATION/TRAINING PROGRAM

## 2021-05-10 PROCEDURE — 36415 COLL VENOUS BLD VENIPUNCTURE: CPT | Performed by: STUDENT IN AN ORGANIZED HEALTH CARE EDUCATION/TRAINING PROGRAM

## 2021-05-10 PROCEDURE — 80053 COMPREHEN METABOLIC PANEL: CPT | Performed by: STUDENT IN AN ORGANIZED HEALTH CARE EDUCATION/TRAINING PROGRAM

## 2021-05-10 PROCEDURE — 83735 ASSAY OF MAGNESIUM: CPT | Performed by: STUDENT IN AN ORGANIZED HEALTH CARE EDUCATION/TRAINING PROGRAM

## 2021-05-10 PROCEDURE — 63600175 PHARM REV CODE 636 W HCPCS: Performed by: STUDENT IN AN ORGANIZED HEALTH CARE EDUCATION/TRAINING PROGRAM

## 2021-05-10 PROCEDURE — 25000003 PHARM REV CODE 250: Performed by: INTERNAL MEDICINE

## 2021-05-10 PROCEDURE — 25000003 PHARM REV CODE 250: Performed by: HOSPITALIST

## 2021-05-10 PROCEDURE — 99239 HOSP IP/OBS DSCHRG MGMT >30: CPT | Mod: GC,,, | Performed by: STUDENT IN AN ORGANIZED HEALTH CARE EDUCATION/TRAINING PROGRAM

## 2021-05-10 RX ORDER — HYDROCODONE BITARTRATE AND ACETAMINOPHEN 10; 325 MG/1; MG/1
1 TABLET ORAL EVERY 12 HOURS PRN
Qty: 14 TABLET | Refills: 0 | Status: SHIPPED | OUTPATIENT
Start: 2021-05-10 | End: 2021-05-11 | Stop reason: SDUPTHER

## 2021-05-10 RX ORDER — MAGNESIUM SULFATE HEPTAHYDRATE 40 MG/ML
2 INJECTION, SOLUTION INTRAVENOUS
Status: DISCONTINUED | OUTPATIENT
Start: 2021-05-10 | End: 2021-05-10 | Stop reason: HOSPADM

## 2021-05-10 RX ADMIN — PANTOPRAZOLE SODIUM 40 MG: 40 TABLET, DELAYED RELEASE ORAL at 08:05

## 2021-05-10 RX ADMIN — ASPIRIN 81 MG CHEWABLE TABLET 81 MG: 81 TABLET CHEWABLE at 08:05

## 2021-05-10 RX ADMIN — METOPROLOL SUCCINATE 50 MG: 50 TABLET, EXTENDED RELEASE ORAL at 08:05

## 2021-05-10 RX ADMIN — HYDROCODONE BITARTRATE AND ACETAMINOPHEN 1 TABLET: 5; 325 TABLET ORAL at 04:05

## 2021-05-10 RX ADMIN — TORSEMIDE 40 MG: 20 TABLET ORAL at 08:05

## 2021-05-10 RX ADMIN — Medication 200 MG: at 09:05

## 2021-05-10 RX ADMIN — HYDROCODONE BITARTRATE AND ACETAMINOPHEN 1 TABLET: 5; 325 TABLET ORAL at 11:05

## 2021-05-10 RX ADMIN — MAGNESIUM SULFATE 2 G: 2 INJECTION INTRAVENOUS at 11:05

## 2021-05-10 RX ADMIN — Medication 1000 UNITS: at 08:05

## 2021-05-11 RX ORDER — HYDROCODONE BITARTRATE AND ACETAMINOPHEN 10; 325 MG/1; MG/1
1 TABLET ORAL EVERY 12 HOURS PRN
Qty: 60 TABLET | Refills: 0 | Status: SHIPPED | OUTPATIENT
Start: 2021-05-11 | End: 2021-06-11 | Stop reason: SDUPTHER

## 2021-05-12 ENCOUNTER — PATIENT MESSAGE (OUTPATIENT)
Dept: ENDOSCOPY | Facility: HOSPITAL | Age: 71
End: 2021-05-12

## 2021-05-12 ENCOUNTER — PATIENT MESSAGE (OUTPATIENT)
Dept: INTERNAL MEDICINE | Facility: CLINIC | Age: 71
End: 2021-05-12

## 2021-05-12 ENCOUNTER — TELEPHONE (OUTPATIENT)
Dept: ENDOSCOPY | Facility: HOSPITAL | Age: 71
End: 2021-05-12

## 2021-05-12 DIAGNOSIS — K83.1 BILIARY STRICTURE: ICD-10-CM

## 2021-05-13 PROBLEM — E83.51 HYPOCALCEMIA: Status: ACTIVE | Noted: 2021-05-13

## 2021-05-13 PROBLEM — F10.11 HISTORY OF ALCOHOL ABUSE: Status: ACTIVE | Noted: 2021-03-02

## 2021-05-13 PROBLEM — E87.1 HYPONATREMIA: Status: ACTIVE | Noted: 2021-05-13

## 2021-05-13 PROBLEM — B95.2 ENTEROCOCCUS UTI: Status: ACTIVE | Noted: 2021-04-30

## 2021-05-13 PROBLEM — R53.81 DEBILITY: Status: ACTIVE | Noted: 2021-05-13

## 2021-05-13 PROBLEM — Z85.118 HISTORY OF LUNG CANCER: Status: ACTIVE | Noted: 2020-10-13

## 2021-05-13 PROBLEM — K57.90 DIVERTICULOSIS: Status: ACTIVE | Noted: 2018-04-24

## 2021-05-13 PROBLEM — J96.11 CHRONIC HYPOXEMIC RESPIRATORY FAILURE: Status: ACTIVE | Noted: 2021-05-13

## 2021-05-27 NOTE — Clinical Note
Thank you for referring Nalini Varma for follow-up of heart failure with preserved ejection fraction (HFpEF). Please see my note for details of this encounter. If you have any questions, please contact me.  Thank you again for the referral.  4

## 2021-05-28 ENCOUNTER — EXTERNAL HOSPITAL ADMISSION (OUTPATIENT)
Dept: SKILLED NURSING FACILITY | Facility: HOSPITAL | Age: 71
End: 2021-05-28
Payer: MEDICARE

## 2021-05-28 DIAGNOSIS — K80.50 CHOLEDOCHOLITHIASIS: Primary | ICD-10-CM

## 2021-05-28 PROCEDURE — 99499 UNLISTED E&M SERVICE: CPT | Mod: ,,, | Performed by: FAMILY MEDICINE

## 2021-05-28 PROCEDURE — 99499 NO LOS: ICD-10-PCS | Mod: ,,, | Performed by: FAMILY MEDICINE

## 2021-05-31 ENCOUNTER — TELEPHONE (OUTPATIENT)
Dept: ENDOSCOPY | Facility: HOSPITAL | Age: 71
End: 2021-05-31

## 2021-06-04 ENCOUNTER — EXTERNAL HOSPITAL ADMISSION (OUTPATIENT)
Dept: SKILLED NURSING FACILITY | Facility: HOSPITAL | Age: 71
End: 2021-06-04
Payer: MEDICARE

## 2021-06-04 DIAGNOSIS — K80.50 CHOLEDOCHOLITHIASIS: Primary | ICD-10-CM

## 2021-06-05 PROCEDURE — 99499 UNLISTED E&M SERVICE: CPT | Mod: ,,, | Performed by: FAMILY MEDICINE

## 2021-06-05 PROCEDURE — 99499 NO LOS: ICD-10-PCS | Mod: ,,, | Performed by: FAMILY MEDICINE

## 2021-06-08 ENCOUNTER — TELEPHONE (OUTPATIENT)
Dept: ENDOSCOPY | Facility: HOSPITAL | Age: 71
End: 2021-06-08

## 2021-06-09 ENCOUNTER — EXTERNAL HOSPITAL ADMISSION (OUTPATIENT)
Dept: SKILLED NURSING FACILITY | Facility: HOSPITAL | Age: 71
End: 2021-06-09
Payer: MEDICARE

## 2021-06-09 ENCOUNTER — TELEPHONE (OUTPATIENT)
Dept: INTERNAL MEDICINE | Facility: CLINIC | Age: 71
End: 2021-06-09

## 2021-06-09 ENCOUNTER — TELEPHONE (OUTPATIENT)
Dept: ENDOSCOPY | Facility: HOSPITAL | Age: 71
End: 2021-06-09

## 2021-06-09 DIAGNOSIS — K80.50 CHOLEDOCHOLITHIASIS: Primary | ICD-10-CM

## 2021-06-09 PROCEDURE — 99499 NO LOS: ICD-10-PCS | Mod: ,,, | Performed by: FAMILY MEDICINE

## 2021-06-09 PROCEDURE — 99499 UNLISTED E&M SERVICE: CPT | Mod: ,,, | Performed by: FAMILY MEDICINE

## 2021-06-11 ENCOUNTER — PATIENT MESSAGE (OUTPATIENT)
Dept: INTERNAL MEDICINE | Facility: CLINIC | Age: 71
End: 2021-06-11

## 2021-06-11 RX ORDER — HYDROCODONE BITARTRATE AND ACETAMINOPHEN 10; 325 MG/1; MG/1
1 TABLET ORAL EVERY 12 HOURS PRN
Qty: 60 TABLET | Refills: 0 | Status: SHIPPED | OUTPATIENT
Start: 2021-06-11 | End: 2021-07-14 | Stop reason: SDUPTHER

## 2021-06-14 ENCOUNTER — TELEPHONE (OUTPATIENT)
Dept: ENDOSCOPY | Facility: HOSPITAL | Age: 71
End: 2021-06-14

## 2021-06-14 DIAGNOSIS — Z01.818 PRE-OP TESTING: ICD-10-CM

## 2021-06-17 ENCOUNTER — PATIENT MESSAGE (OUTPATIENT)
Dept: INTERNAL MEDICINE | Facility: CLINIC | Age: 71
End: 2021-06-17

## 2021-06-17 DIAGNOSIS — I10 HYPERTENSION, BENIGN: Primary | ICD-10-CM

## 2021-06-17 PROCEDURE — G0180 MD CERTIFICATION HHA PATIENT: HCPCS | Mod: ,,, | Performed by: INTERNAL MEDICINE

## 2021-06-17 PROCEDURE — G0180 PR HOME HEALTH MD CERTIFICATION: ICD-10-PCS | Mod: ,,, | Performed by: INTERNAL MEDICINE

## 2021-06-17 RX ORDER — LOSARTAN POTASSIUM 25 MG/1
25 TABLET ORAL DAILY
Qty: 30 TABLET | Refills: 11 | Status: ON HOLD | OUTPATIENT
Start: 2021-06-17 | End: 2021-09-07 | Stop reason: SDUPTHER

## 2021-06-17 RX ORDER — METOPROLOL SUCCINATE 50 MG/1
50 TABLET, EXTENDED RELEASE ORAL DAILY
Qty: 30 TABLET | Refills: 11 | Status: ON HOLD | OUTPATIENT
Start: 2021-06-17 | End: 2021-09-07 | Stop reason: SDUPTHER

## 2021-06-28 ENCOUNTER — TELEPHONE (OUTPATIENT)
Dept: GASTROENTEROLOGY | Facility: CLINIC | Age: 71
End: 2021-06-28

## 2021-06-29 ENCOUNTER — PATIENT MESSAGE (OUTPATIENT)
Dept: ADMINISTRATIVE | Facility: OTHER | Age: 71
End: 2021-06-29

## 2021-06-29 ENCOUNTER — DOCUMENT SCAN (OUTPATIENT)
Dept: HOME HEALTH SERVICES | Facility: HOSPITAL | Age: 71
End: 2021-06-29
Payer: MEDICARE

## 2021-06-30 ENCOUNTER — TELEPHONE (OUTPATIENT)
Dept: ENDOSCOPY | Facility: HOSPITAL | Age: 71
End: 2021-06-30

## 2021-06-30 ENCOUNTER — ANESTHESIA EVENT (OUTPATIENT)
Dept: ENDOSCOPY | Facility: HOSPITAL | Age: 71
End: 2021-06-30
Payer: MEDICARE

## 2021-07-01 ENCOUNTER — HOSPITAL ENCOUNTER (OUTPATIENT)
Facility: HOSPITAL | Age: 71
Discharge: HOME | End: 2021-07-01
Attending: INTERNAL MEDICINE | Admitting: INTERNAL MEDICINE
Payer: MEDICARE

## 2021-07-01 ENCOUNTER — ANESTHESIA (OUTPATIENT)
Dept: ENDOSCOPY | Facility: HOSPITAL | Age: 71
End: 2021-07-01
Payer: MEDICARE

## 2021-07-01 VITALS
TEMPERATURE: 98 F | OXYGEN SATURATION: 100 % | HEIGHT: 66 IN | WEIGHT: 210 LBS | RESPIRATION RATE: 20 BRPM | SYSTOLIC BLOOD PRESSURE: 125 MMHG | BODY MASS INDEX: 33.75 KG/M2 | HEART RATE: 99 BPM | DIASTOLIC BLOOD PRESSURE: 58 MMHG

## 2021-07-01 DIAGNOSIS — K80.50 CHOLEDOCHOLITHIASIS: Primary | ICD-10-CM

## 2021-07-01 LAB — SARS-COV-2 RDRP RESP QL NAA+PROBE: NEGATIVE

## 2021-07-01 PROCEDURE — 43275 ERCP REMOVE FORGN BODY DUCT: CPT | Performed by: INTERNAL MEDICINE

## 2021-07-01 PROCEDURE — 63600175 PHARM REV CODE 636 W HCPCS: Performed by: NURSE ANESTHETIST, CERTIFIED REGISTERED

## 2021-07-01 PROCEDURE — D9220A PRA ANESTHESIA: ICD-10-PCS | Mod: ANES,,, | Performed by: ANESTHESIOLOGY

## 2021-07-01 PROCEDURE — 74328 PR  X-RAY FOR BILE DUCT ENDOSCOPY: ICD-10-PCS | Mod: 26,,, | Performed by: INTERNAL MEDICINE

## 2021-07-01 PROCEDURE — 25000003 PHARM REV CODE 250: Performed by: NURSE ANESTHETIST, CERTIFIED REGISTERED

## 2021-07-01 PROCEDURE — 37000009 HC ANESTHESIA EA ADD 15 MINS: Performed by: INTERNAL MEDICINE

## 2021-07-01 PROCEDURE — C1748 ENDOSCOPE, SINGLE, UGI: HCPCS

## 2021-07-01 PROCEDURE — 74328 X-RAY BILE DUCT ENDOSCOPY: CPT | Performed by: INTERNAL MEDICINE

## 2021-07-01 PROCEDURE — 27201089 HC SNARE, DISP (ANY): Performed by: INTERNAL MEDICINE

## 2021-07-01 PROCEDURE — 43275 PR ERCP W/REMOVAL FOREIGN BODY/STENT FROM BILIARY/PANCREATIC DUCT: ICD-10-PCS | Mod: ,,, | Performed by: INTERNAL MEDICINE

## 2021-07-01 PROCEDURE — 74328 X-RAY BILE DUCT ENDOSCOPY: CPT | Mod: 26,,, | Performed by: INTERNAL MEDICINE

## 2021-07-01 PROCEDURE — D9220A PRA ANESTHESIA: ICD-10-PCS | Mod: CRNA,,, | Performed by: NURSE ANESTHETIST, CERTIFIED REGISTERED

## 2021-07-01 PROCEDURE — 43275 ERCP REMOVE FORGN BODY DUCT: CPT | Mod: ,,, | Performed by: INTERNAL MEDICINE

## 2021-07-01 PROCEDURE — 27202125 HC BALLOON, EXTRACTION (ANY): Performed by: INTERNAL MEDICINE

## 2021-07-01 PROCEDURE — C1769 GUIDE WIRE: HCPCS | Performed by: INTERNAL MEDICINE

## 2021-07-01 PROCEDURE — 37000008 HC ANESTHESIA 1ST 15 MINUTES: Performed by: INTERNAL MEDICINE

## 2021-07-01 PROCEDURE — D9220A PRA ANESTHESIA: Mod: CRNA,,, | Performed by: NURSE ANESTHETIST, CERTIFIED REGISTERED

## 2021-07-01 PROCEDURE — D9220A PRA ANESTHESIA: Mod: ANES,,, | Performed by: ANESTHESIOLOGY

## 2021-07-01 PROCEDURE — U0002 COVID-19 LAB TEST NON-CDC: HCPCS | Performed by: INTERNAL MEDICINE

## 2021-07-01 RX ORDER — FENTANYL CITRATE 50 UG/ML
INJECTION, SOLUTION INTRAMUSCULAR; INTRAVENOUS
Status: DISCONTINUED | OUTPATIENT
Start: 2021-07-01 | End: 2021-07-01

## 2021-07-01 RX ORDER — PROPOFOL 10 MG/ML
VIAL (ML) INTRAVENOUS
Status: DISCONTINUED | OUTPATIENT
Start: 2021-07-01 | End: 2021-07-01

## 2021-07-01 RX ORDER — SODIUM CHLORIDE 0.9 % (FLUSH) 0.9 %
10 SYRINGE (ML) INJECTION
Status: DISCONTINUED | OUTPATIENT
Start: 2021-07-01 | End: 2021-07-01 | Stop reason: HOSPADM

## 2021-07-01 RX ORDER — SODIUM CHLORIDE 9 MG/ML
INJECTION, SOLUTION INTRAVENOUS CONTINUOUS
Status: DISCONTINUED | OUTPATIENT
Start: 2021-07-01 | End: 2021-07-01 | Stop reason: HOSPADM

## 2021-07-01 RX ORDER — DEXMEDETOMIDINE HYDROCHLORIDE 100 UG/ML
INJECTION, SOLUTION INTRAVENOUS
Status: DISCONTINUED | OUTPATIENT
Start: 2021-07-01 | End: 2021-07-01

## 2021-07-01 RX ORDER — PHENYLEPHRINE HYDROCHLORIDE 10 MG/ML
INJECTION INTRAVENOUS
Status: DISCONTINUED | OUTPATIENT
Start: 2021-07-01 | End: 2021-07-01

## 2021-07-01 RX ORDER — PROPOFOL 10 MG/ML
VIAL (ML) INTRAVENOUS CONTINUOUS PRN
Status: DISCONTINUED | OUTPATIENT
Start: 2021-07-01 | End: 2021-07-01

## 2021-07-01 RX ORDER — CIPROFLOXACIN 500 MG/1
500 TABLET ORAL EVERY 12 HOURS
Qty: 10 TABLET | Refills: 0 | Status: SHIPPED | OUTPATIENT
Start: 2021-07-01 | End: 2021-07-07

## 2021-07-01 RX ORDER — HALOPERIDOL 5 MG/ML
0.5 INJECTION INTRAMUSCULAR EVERY 10 MIN PRN
Status: DISCONTINUED | OUTPATIENT
Start: 2021-07-01 | End: 2021-07-01 | Stop reason: HOSPADM

## 2021-07-01 RX ORDER — LIDOCAINE HCL/PF 100 MG/5ML
SYRINGE (ML) INTRAVENOUS
Status: DISCONTINUED | OUTPATIENT
Start: 2021-07-01 | End: 2021-07-01

## 2021-07-01 RX ADMIN — PHENYLEPHRINE HYDROCHLORIDE 100 MCG: 10 INJECTION INTRAVENOUS at 03:07

## 2021-07-01 RX ADMIN — DEXMEDETOMIDINE HYDROCHLORIDE 4 MCG: 100 INJECTION, SOLUTION, CONCENTRATE INTRAVENOUS at 03:07

## 2021-07-01 RX ADMIN — PHENYLEPHRINE HYDROCHLORIDE 200 MCG: 10 INJECTION INTRAVENOUS at 03:07

## 2021-07-01 RX ADMIN — PROPOFOL 30 MG: 10 INJECTION, EMULSION INTRAVENOUS at 03:07

## 2021-07-01 RX ADMIN — SODIUM CHLORIDE: 9 INJECTION, SOLUTION INTRAVENOUS at 02:07

## 2021-07-01 RX ADMIN — Medication 60 MG: at 03:07

## 2021-07-01 RX ADMIN — PROPOFOL 20 MG: 10 INJECTION, EMULSION INTRAVENOUS at 03:07

## 2021-07-01 RX ADMIN — PROPOFOL 40 MG: 10 INJECTION, EMULSION INTRAVENOUS at 03:07

## 2021-07-01 RX ADMIN — PROPOFOL 150 MCG/KG/MIN: 10 INJECTION, EMULSION INTRAVENOUS at 03:07

## 2021-07-01 RX ADMIN — FENTANYL CITRATE 50 MCG: 50 INJECTION, SOLUTION INTRAMUSCULAR; INTRAVENOUS at 03:07

## 2021-07-05 ENCOUNTER — PATIENT MESSAGE (OUTPATIENT)
Dept: INTERNAL MEDICINE | Facility: CLINIC | Age: 71
End: 2021-07-05

## 2021-07-05 DIAGNOSIS — N18.30 STAGE 3 CHRONIC KIDNEY DISEASE, UNSPECIFIED WHETHER STAGE 3A OR 3B CKD: ICD-10-CM

## 2021-07-05 DIAGNOSIS — D50.0 IRON DEFICIENCY ANEMIA DUE TO CHRONIC BLOOD LOSS: ICD-10-CM

## 2021-07-05 DIAGNOSIS — E55.9 VITAMIN D DEFICIENCY: ICD-10-CM

## 2021-07-05 DIAGNOSIS — E78.5 HYPERLIPIDEMIA, UNSPECIFIED HYPERLIPIDEMIA TYPE: Chronic | ICD-10-CM

## 2021-07-05 DIAGNOSIS — I10 ESSENTIAL HYPERTENSION: Primary | Chronic | ICD-10-CM

## 2021-07-08 ENCOUNTER — SPECIALTY PHARMACY (OUTPATIENT)
Dept: PHARMACY | Facility: CLINIC | Age: 71
End: 2021-07-08

## 2021-07-08 ENCOUNTER — DOCUMENT SCAN (OUTPATIENT)
Dept: HOME HEALTH SERVICES | Facility: HOSPITAL | Age: 71
End: 2021-07-08
Payer: MEDICARE

## 2021-07-08 DIAGNOSIS — M81.0 OSTEOPOROSIS: ICD-10-CM

## 2021-07-08 RX ORDER — DENOSUMAB 60 MG/ML
60 INJECTION SUBCUTANEOUS
Qty: 2 ML | Refills: 0 | OUTPATIENT
Start: 2021-07-08 | End: 2022-07-08

## 2021-07-08 RX ORDER — PANTOPRAZOLE SODIUM 40 MG/1
40 TABLET, DELAYED RELEASE ORAL DAILY
Qty: 90 TABLET | Refills: 1 | Status: ON HOLD | OUTPATIENT
Start: 2021-07-08 | End: 2021-08-27 | Stop reason: HOSPADM

## 2021-07-13 ENCOUNTER — EXTERNAL HOME HEALTH (OUTPATIENT)
Dept: HOME HEALTH SERVICES | Facility: HOSPITAL | Age: 71
End: 2021-07-13
Payer: MEDICARE

## 2021-07-14 ENCOUNTER — PATIENT MESSAGE (OUTPATIENT)
Dept: INTERNAL MEDICINE | Facility: CLINIC | Age: 71
End: 2021-07-14

## 2021-07-14 DIAGNOSIS — G89.4 CHRONIC PAIN DISORDER: Primary | ICD-10-CM

## 2021-07-14 RX ORDER — HYDROCODONE BITARTRATE AND ACETAMINOPHEN 10; 325 MG/1; MG/1
1 TABLET ORAL EVERY 12 HOURS PRN
Qty: 60 TABLET | Refills: 0 | Status: ON HOLD | OUTPATIENT
Start: 2021-07-14 | End: 2021-08-16 | Stop reason: SDUPTHER

## 2021-07-28 ENCOUNTER — HOSPITAL ENCOUNTER (OUTPATIENT)
Facility: HOSPITAL | Age: 71
Discharge: NURSING, REHAB OR LONG-TERM CARE | End: 2021-08-05
Attending: EMERGENCY MEDICINE | Admitting: STUDENT IN AN ORGANIZED HEALTH CARE EDUCATION/TRAINING PROGRAM
Payer: MEDICARE

## 2021-07-28 DIAGNOSIS — M48.00 SPINAL STENOSIS, UNSPECIFIED SPINAL REGION: Primary | ICD-10-CM

## 2021-07-28 DIAGNOSIS — K57.92 DIVERTICULITIS: ICD-10-CM

## 2021-07-28 DIAGNOSIS — J44.9 CHRONIC OBSTRUCTIVE PULMONARY DISEASE, UNSPECIFIED COPD TYPE: ICD-10-CM

## 2021-07-28 DIAGNOSIS — N18.30 STAGE 3 CHRONIC KIDNEY DISEASE, UNSPECIFIED WHETHER STAGE 3A OR 3B CKD: ICD-10-CM

## 2021-07-28 DIAGNOSIS — I10 ESSENTIAL HYPERTENSION: Chronic | ICD-10-CM

## 2021-07-28 DIAGNOSIS — R53.81 DEBILITY: ICD-10-CM

## 2021-07-28 LAB
ALBUMIN SERPL BCP-MCNC: 2 G/DL (ref 3.5–5.2)
ALP SERPL-CCNC: 333 U/L (ref 55–135)
ALT SERPL W/O P-5'-P-CCNC: 61 U/L (ref 10–44)
ANION GAP SERPL CALC-SCNC: 14 MMOL/L (ref 8–16)
AST SERPL-CCNC: 95 U/L (ref 10–40)
BASOPHILS # BLD AUTO: 0.03 K/UL (ref 0–0.2)
BASOPHILS NFR BLD: 0.4 % (ref 0–1.9)
BILIRUB SERPL-MCNC: 0.6 MG/DL (ref 0.1–1)
BUN SERPL-MCNC: 44 MG/DL (ref 8–23)
CALCIUM SERPL-MCNC: 9.2 MG/DL (ref 8.7–10.5)
CHLORIDE SERPL-SCNC: 94 MMOL/L (ref 95–110)
CO2 SERPL-SCNC: 23 MMOL/L (ref 23–29)
CREAT SERPL-MCNC: 1.3 MG/DL (ref 0.5–1.4)
CTP QC/QA: YES
DIFFERENTIAL METHOD: ABNORMAL
EOSINOPHIL # BLD AUTO: 0.1 K/UL (ref 0–0.5)
EOSINOPHIL NFR BLD: 0.8 % (ref 0–8)
ERYTHROCYTE [DISTWIDTH] IN BLOOD BY AUTOMATED COUNT: 17.3 % (ref 11.5–14.5)
EST. GFR  (AFRICAN AMERICAN): 47.7 ML/MIN/1.73 M^2
EST. GFR  (NON AFRICAN AMERICAN): 41.4 ML/MIN/1.73 M^2
GLUCOSE SERPL-MCNC: 69 MG/DL (ref 70–110)
HCT VFR BLD AUTO: 26.9 % (ref 37–48.5)
HCV AB SERPL QL IA: NEGATIVE
HGB BLD-MCNC: 9.2 G/DL (ref 12–16)
IMM GRANULOCYTES # BLD AUTO: 0.13 K/UL (ref 0–0.04)
IMM GRANULOCYTES NFR BLD AUTO: 1.7 % (ref 0–0.5)
LYMPHOCYTES # BLD AUTO: 1.3 K/UL (ref 1–4.8)
LYMPHOCYTES NFR BLD: 16.6 % (ref 18–48)
MCH RBC QN AUTO: 31 PG (ref 27–31)
MCHC RBC AUTO-ENTMCNC: 34.2 G/DL (ref 32–36)
MCV RBC AUTO: 91 FL (ref 82–98)
MONOCYTES # BLD AUTO: 0.8 K/UL (ref 0.3–1)
MONOCYTES NFR BLD: 10.7 % (ref 4–15)
NEUTROPHILS # BLD AUTO: 5.3 K/UL (ref 1.8–7.7)
NEUTROPHILS NFR BLD: 69.8 % (ref 38–73)
NRBC BLD-RTO: 0 /100 WBC
PLATELET # BLD AUTO: 191 K/UL (ref 150–450)
PMV BLD AUTO: 10.2 FL (ref 9.2–12.9)
POTASSIUM SERPL-SCNC: 3.7 MMOL/L (ref 3.5–5.1)
PROT SERPL-MCNC: 5.6 G/DL (ref 6–8.4)
RBC # BLD AUTO: 2.97 M/UL (ref 4–5.4)
SARS-COV-2 RDRP RESP QL NAA+PROBE: NEGATIVE
SODIUM SERPL-SCNC: 131 MMOL/L (ref 136–145)
WBC # BLD AUTO: 7.55 K/UL (ref 3.9–12.7)

## 2021-07-28 PROCEDURE — 99219 PR INITIAL OBSERVATION CARE,LEVL II: CPT | Mod: ,,, | Performed by: STUDENT IN AN ORGANIZED HEALTH CARE EDUCATION/TRAINING PROGRAM

## 2021-07-28 PROCEDURE — 99204 PR OFFICE/OUTPT VISIT, NEW, LEVL IV, 45-59 MIN: ICD-10-PCS | Mod: ,,, | Performed by: STUDENT IN AN ORGANIZED HEALTH CARE EDUCATION/TRAINING PROGRAM

## 2021-07-28 PROCEDURE — U0002 COVID-19 LAB TEST NON-CDC: HCPCS | Performed by: STUDENT IN AN ORGANIZED HEALTH CARE EDUCATION/TRAINING PROGRAM

## 2021-07-28 PROCEDURE — 99219 PR INITIAL OBSERVATION CARE,LEVL II: ICD-10-PCS | Mod: ,,, | Performed by: STUDENT IN AN ORGANIZED HEALTH CARE EDUCATION/TRAINING PROGRAM

## 2021-07-28 PROCEDURE — 86803 HEPATITIS C AB TEST: CPT | Performed by: EMERGENCY MEDICINE

## 2021-07-28 PROCEDURE — 85025 COMPLETE CBC W/AUTO DIFF WBC: CPT | Performed by: EMERGENCY MEDICINE

## 2021-07-28 PROCEDURE — 99204 OFFICE O/P NEW MOD 45 MIN: CPT | Mod: ,,, | Performed by: STUDENT IN AN ORGANIZED HEALTH CARE EDUCATION/TRAINING PROGRAM

## 2021-07-28 PROCEDURE — G0378 HOSPITAL OBSERVATION PER HR: HCPCS

## 2021-07-28 PROCEDURE — 99218 PR INITIAL OBSERVATION CARE,LEVL I: CPT | Mod: GC,,, | Performed by: HOSPITALIST

## 2021-07-28 PROCEDURE — 99218 PR INITIAL OBSERVATION CARE,LEVL I: ICD-10-PCS | Mod: GC,,, | Performed by: HOSPITALIST

## 2021-07-28 PROCEDURE — 80053 COMPREHEN METABOLIC PANEL: CPT | Performed by: EMERGENCY MEDICINE

## 2021-07-28 PROCEDURE — 99285 EMERGENCY DEPT VISIT HI MDM: CPT | Mod: ,,, | Performed by: EMERGENCY MEDICINE

## 2021-07-28 PROCEDURE — 99285 PR EMERGENCY DEPT VISIT,LEVEL V: ICD-10-PCS | Mod: ,,, | Performed by: EMERGENCY MEDICINE

## 2021-07-28 PROCEDURE — 99285 EMERGENCY DEPT VISIT HI MDM: CPT

## 2021-07-28 RX ORDER — CALCIUM CARBONATE 200(500)MG
1000 TABLET,CHEWABLE ORAL 2 TIMES DAILY
Status: DISCONTINUED | OUTPATIENT
Start: 2021-07-28 | End: 2021-08-05 | Stop reason: HOSPADM

## 2021-07-28 RX ORDER — IBUPROFEN 200 MG
16 TABLET ORAL
Status: DISCONTINUED | OUTPATIENT
Start: 2021-07-28 | End: 2021-08-05 | Stop reason: HOSPADM

## 2021-07-28 RX ORDER — IBUPROFEN 200 MG
24 TABLET ORAL
Status: DISCONTINUED | OUTPATIENT
Start: 2021-07-28 | End: 2021-08-05 | Stop reason: HOSPADM

## 2021-07-28 RX ORDER — LOSARTAN POTASSIUM 25 MG/1
25 TABLET ORAL DAILY
Status: DISCONTINUED | OUTPATIENT
Start: 2021-07-29 | End: 2021-07-28

## 2021-07-28 RX ORDER — POLYETHYLENE GLYCOL 3350 17 G/17G
17 POWDER, FOR SOLUTION ORAL 2 TIMES DAILY PRN
Status: DISCONTINUED | OUTPATIENT
Start: 2021-07-28 | End: 2021-08-05 | Stop reason: HOSPADM

## 2021-07-28 RX ORDER — METOPROLOL SUCCINATE 50 MG/1
50 TABLET, EXTENDED RELEASE ORAL DAILY
Status: DISCONTINUED | OUTPATIENT
Start: 2021-07-29 | End: 2021-08-05 | Stop reason: HOSPADM

## 2021-07-28 RX ORDER — FLUTICASONE PROPIONATE 50 MCG
1 SPRAY, SUSPENSION (ML) NASAL 2 TIMES DAILY PRN
Status: DISCONTINUED | OUTPATIENT
Start: 2021-07-28 | End: 2021-08-05 | Stop reason: HOSPADM

## 2021-07-28 RX ORDER — METRONIDAZOLE 500 MG/1
500 TABLET ORAL EVERY 12 HOURS
Status: DISCONTINUED | OUTPATIENT
Start: 2021-07-28 | End: 2021-08-05 | Stop reason: HOSPADM

## 2021-07-28 RX ORDER — INSULIN ASPART 100 [IU]/ML
0-5 INJECTION, SOLUTION INTRAVENOUS; SUBCUTANEOUS
Status: DISCONTINUED | OUTPATIENT
Start: 2021-07-28 | End: 2021-08-05 | Stop reason: HOSPADM

## 2021-07-28 RX ORDER — TORSEMIDE 20 MG/1
40 TABLET ORAL DAILY
Status: DISCONTINUED | OUTPATIENT
Start: 2021-07-29 | End: 2021-07-28

## 2021-07-28 RX ORDER — GLUCAGON 1 MG
1 KIT INJECTION
Status: DISCONTINUED | OUTPATIENT
Start: 2021-07-28 | End: 2021-08-05 | Stop reason: HOSPADM

## 2021-07-28 RX ORDER — ATORVASTATIN CALCIUM 40 MG/1
40 TABLET, FILM COATED ORAL DAILY
Status: DISCONTINUED | OUTPATIENT
Start: 2021-07-29 | End: 2021-07-28

## 2021-07-28 RX ORDER — FLUTICASONE FUROATE AND VILANTEROL 100; 25 UG/1; UG/1
1 POWDER RESPIRATORY (INHALATION) DAILY
Refills: 3 | Status: DISCONTINUED | OUTPATIENT
Start: 2021-07-29 | End: 2021-08-05 | Stop reason: HOSPADM

## 2021-07-28 RX ORDER — CIPROFLOXACIN 500 MG/1
500 TABLET ORAL EVERY 12 HOURS
Status: DISCONTINUED | OUTPATIENT
Start: 2021-07-28 | End: 2021-08-05 | Stop reason: HOSPADM

## 2021-07-28 RX ORDER — PANTOPRAZOLE SODIUM 20 MG/1
40 TABLET, DELAYED RELEASE ORAL DAILY
Status: DISCONTINUED | OUTPATIENT
Start: 2021-07-29 | End: 2021-08-05 | Stop reason: HOSPADM

## 2021-07-28 RX ORDER — ALBUTEROL SULFATE 90 UG/1
2 AEROSOL, METERED RESPIRATORY (INHALATION) EVERY 6 HOURS PRN
Status: DISCONTINUED | OUTPATIENT
Start: 2021-07-28 | End: 2021-08-05 | Stop reason: HOSPADM

## 2021-07-29 ENCOUNTER — PATIENT OUTREACH (OUTPATIENT)
Dept: EMERGENCY MEDICINE | Facility: HOSPITAL | Age: 71
End: 2021-07-29

## 2021-07-29 PROBLEM — K57.92 DIVERTICULITIS: Status: ACTIVE | Noted: 2021-07-29

## 2021-07-29 LAB
ALBUMIN SERPL BCP-MCNC: 1.9 G/DL (ref 3.5–5.2)
ALP SERPL-CCNC: 284 U/L (ref 55–135)
ALT SERPL W/O P-5'-P-CCNC: 46 U/L (ref 10–44)
ANION GAP SERPL CALC-SCNC: 9 MMOL/L (ref 8–16)
AST SERPL-CCNC: 58 U/L (ref 10–40)
BASOPHILS # BLD AUTO: 0.03 K/UL (ref 0–0.2)
BASOPHILS NFR BLD: 0.4 % (ref 0–1.9)
BILIRUB DIRECT SERPL-MCNC: 0.4 MG/DL (ref 0.1–0.3)
BILIRUB SERPL-MCNC: 0.5 MG/DL (ref 0.1–1)
BUN SERPL-MCNC: 33 MG/DL (ref 8–23)
CALCIUM SERPL-MCNC: 9.7 MG/DL (ref 8.7–10.5)
CHLORIDE SERPL-SCNC: 97 MMOL/L (ref 95–110)
CO2 SERPL-SCNC: 28 MMOL/L (ref 23–29)
CREAT SERPL-MCNC: 1 MG/DL (ref 0.5–1.4)
DIFFERENTIAL METHOD: ABNORMAL
EOSINOPHIL # BLD AUTO: 0.1 K/UL (ref 0–0.5)
EOSINOPHIL NFR BLD: 1.1 % (ref 0–8)
ERYTHROCYTE [DISTWIDTH] IN BLOOD BY AUTOMATED COUNT: 17.3 % (ref 11.5–14.5)
EST. GFR  (AFRICAN AMERICAN): >60 ML/MIN/1.73 M^2
EST. GFR  (NON AFRICAN AMERICAN): 56.8 ML/MIN/1.73 M^2
GLUCOSE SERPL-MCNC: 98 MG/DL (ref 70–110)
HCT VFR BLD AUTO: 26.1 % (ref 37–48.5)
HGB BLD-MCNC: 8.7 G/DL (ref 12–16)
IMM GRANULOCYTES # BLD AUTO: 0.23 K/UL (ref 0–0.04)
IMM GRANULOCYTES NFR BLD AUTO: 3.1 % (ref 0–0.5)
LYMPHOCYTES # BLD AUTO: 1.5 K/UL (ref 1–4.8)
LYMPHOCYTES NFR BLD: 20.1 % (ref 18–48)
MCH RBC QN AUTO: 30.5 PG (ref 27–31)
MCHC RBC AUTO-ENTMCNC: 33.3 G/DL (ref 32–36)
MCV RBC AUTO: 92 FL (ref 82–98)
MONOCYTES # BLD AUTO: 1 K/UL (ref 0.3–1)
MONOCYTES NFR BLD: 13.5 % (ref 4–15)
NEUTROPHILS # BLD AUTO: 4.6 K/UL (ref 1.8–7.7)
NEUTROPHILS NFR BLD: 61.8 % (ref 38–73)
NRBC BLD-RTO: 0 /100 WBC
PLATELET # BLD AUTO: 189 K/UL (ref 150–450)
PMV BLD AUTO: 10.5 FL (ref 9.2–12.9)
POCT GLUCOSE: 96 MG/DL (ref 70–110)
POTASSIUM SERPL-SCNC: 3.8 MMOL/L (ref 3.5–5.1)
PROT SERPL-MCNC: 5.6 G/DL (ref 6–8.4)
RBC # BLD AUTO: 2.85 M/UL (ref 4–5.4)
SODIUM SERPL-SCNC: 134 MMOL/L (ref 136–145)
WBC # BLD AUTO: 7.4 K/UL (ref 3.9–12.7)

## 2021-07-29 PROCEDURE — G0378 HOSPITAL OBSERVATION PER HR: HCPCS

## 2021-07-29 PROCEDURE — 25000003 PHARM REV CODE 250: Performed by: STUDENT IN AN ORGANIZED HEALTH CARE EDUCATION/TRAINING PROGRAM

## 2021-07-29 PROCEDURE — 25000242 PHARM REV CODE 250 ALT 637 W/ HCPCS: Performed by: STUDENT IN AN ORGANIZED HEALTH CARE EDUCATION/TRAINING PROGRAM

## 2021-07-29 PROCEDURE — 85025 COMPLETE CBC W/AUTO DIFF WBC: CPT | Performed by: STUDENT IN AN ORGANIZED HEALTH CARE EDUCATION/TRAINING PROGRAM

## 2021-07-29 PROCEDURE — 97161 PT EVAL LOW COMPLEX 20 MIN: CPT

## 2021-07-29 PROCEDURE — 99900035 HC TECH TIME PER 15 MIN (STAT)

## 2021-07-29 PROCEDURE — 99226 PR SUBSEQUENT OBSERVATION CARE,LEVEL III: CPT | Mod: ,,, | Performed by: PHYSICIAN ASSISTANT

## 2021-07-29 PROCEDURE — 99226 PR SUBSEQUENT OBSERVATION CARE,LEVEL III: ICD-10-PCS | Mod: ,,, | Performed by: PHYSICIAN ASSISTANT

## 2021-07-29 PROCEDURE — 97165 OT EVAL LOW COMPLEX 30 MIN: CPT

## 2021-07-29 PROCEDURE — 99224 PR SUBSEQUENT OBSERVATION CARE,LEVEL I: ICD-10-PCS | Mod: GC,,, | Performed by: HOSPITALIST

## 2021-07-29 PROCEDURE — 94761 N-INVAS EAR/PLS OXIMETRY MLT: CPT

## 2021-07-29 PROCEDURE — 97530 THERAPEUTIC ACTIVITIES: CPT

## 2021-07-29 PROCEDURE — 80076 HEPATIC FUNCTION PANEL: CPT | Performed by: STUDENT IN AN ORGANIZED HEALTH CARE EDUCATION/TRAINING PROGRAM

## 2021-07-29 PROCEDURE — 99224 PR SUBSEQUENT OBSERVATION CARE,LEVEL I: CPT | Mod: GC,,, | Performed by: HOSPITALIST

## 2021-07-29 PROCEDURE — 25000003 PHARM REV CODE 250

## 2021-07-29 PROCEDURE — 36415 COLL VENOUS BLD VENIPUNCTURE: CPT | Performed by: STUDENT IN AN ORGANIZED HEALTH CARE EDUCATION/TRAINING PROGRAM

## 2021-07-29 PROCEDURE — 80048 BASIC METABOLIC PNL TOTAL CA: CPT | Performed by: STUDENT IN AN ORGANIZED HEALTH CARE EDUCATION/TRAINING PROGRAM

## 2021-07-29 PROCEDURE — 27000221 HC OXYGEN, UP TO 24 HOURS

## 2021-07-29 PROCEDURE — 94640 AIRWAY INHALATION TREATMENT: CPT

## 2021-07-29 RX ORDER — ONDANSETRON 8 MG/1
8 TABLET, ORALLY DISINTEGRATING ORAL EVERY 6 HOURS PRN
Status: DISCONTINUED | OUTPATIENT
Start: 2021-07-29 | End: 2021-08-05 | Stop reason: HOSPADM

## 2021-07-29 RX ORDER — LIDOCAINE 50 MG/G
2 PATCH TOPICAL
Status: DISCONTINUED | OUTPATIENT
Start: 2021-07-29 | End: 2021-08-01

## 2021-07-29 RX ORDER — TALC
6 POWDER (GRAM) TOPICAL NIGHTLY
Status: DISCONTINUED | OUTPATIENT
Start: 2021-07-29 | End: 2021-08-05 | Stop reason: HOSPADM

## 2021-07-29 RX ORDER — METOPROLOL SUCCINATE 50 MG/1
50 TABLET, EXTENDED RELEASE ORAL DAILY
Status: CANCELLED | OUTPATIENT
Start: 2021-07-30

## 2021-07-29 RX ADMIN — CIPROFLOXACIN HYDROCHLORIDE 500 MG: 500 TABLET, FILM COATED ORAL at 08:07

## 2021-07-29 RX ADMIN — TIOTROPIUM BROMIDE INHALATION SPRAY 2 PUFF: 3.12 SPRAY, METERED RESPIRATORY (INHALATION) at 10:07

## 2021-07-29 RX ADMIN — MELATONIN TAB 3 MG 6 MG: 3 TAB at 08:07

## 2021-07-29 RX ADMIN — METRONIDAZOLE 500 MG: 500 TABLET ORAL at 12:07

## 2021-07-29 RX ADMIN — CIPROFLOXACIN HYDROCHLORIDE 500 MG: 500 TABLET, FILM COATED ORAL at 09:07

## 2021-07-29 RX ADMIN — CALCIUM CARBONATE (ANTACID) CHEW TAB 500 MG 1000 MG: 500 CHEW TAB at 09:07

## 2021-07-29 RX ADMIN — LIDOCAINE 2 PATCH: 50 PATCH TOPICAL at 04:07

## 2021-07-29 RX ADMIN — CALCIUM CARBONATE (ANTACID) CHEW TAB 500 MG 1000 MG: 500 CHEW TAB at 12:07

## 2021-07-29 RX ADMIN — CIPROFLOXACIN HYDROCHLORIDE 500 MG: 500 TABLET, FILM COATED ORAL at 12:07

## 2021-07-29 RX ADMIN — METRONIDAZOLE 500 MG: 500 TABLET ORAL at 08:07

## 2021-07-29 RX ADMIN — CALCIUM CARBONATE (ANTACID) CHEW TAB 500 MG 1000 MG: 500 CHEW TAB at 08:07

## 2021-07-29 RX ADMIN — ONDANSETRON 8 MG: 8 TABLET, ORALLY DISINTEGRATING ORAL at 09:07

## 2021-07-29 RX ADMIN — PANTOPRAZOLE SODIUM 40 MG: 20 TABLET, DELAYED RELEASE ORAL at 09:07

## 2021-07-29 RX ADMIN — METRONIDAZOLE 500 MG: 500 TABLET ORAL at 09:07

## 2021-07-29 RX ADMIN — METOPROLOL SUCCINATE 50 MG: 50 TABLET, EXTENDED RELEASE ORAL at 09:07

## 2021-07-30 LAB
ALBUMIN SERPL BCP-MCNC: 1.8 G/DL (ref 3.5–5.2)
ALP SERPL-CCNC: 223 U/L (ref 55–135)
ALT SERPL W/O P-5'-P-CCNC: 29 U/L (ref 10–44)
ANION GAP SERPL CALC-SCNC: 10 MMOL/L (ref 8–16)
AST SERPL-CCNC: 32 U/L (ref 10–40)
BASOPHILS # BLD AUTO: 0.03 K/UL (ref 0–0.2)
BASOPHILS NFR BLD: 0.4 % (ref 0–1.9)
BILIRUB SERPL-MCNC: 0.4 MG/DL (ref 0.1–1)
BUN SERPL-MCNC: 24 MG/DL (ref 8–23)
CALCIUM SERPL-MCNC: 9.5 MG/DL (ref 8.7–10.5)
CHLORIDE SERPL-SCNC: 95 MMOL/L (ref 95–110)
CO2 SERPL-SCNC: 25 MMOL/L (ref 23–29)
CREAT SERPL-MCNC: 0.8 MG/DL (ref 0.5–1.4)
DIFFERENTIAL METHOD: ABNORMAL
EOSINOPHIL # BLD AUTO: 0.1 K/UL (ref 0–0.5)
EOSINOPHIL NFR BLD: 2 % (ref 0–8)
ERYTHROCYTE [DISTWIDTH] IN BLOOD BY AUTOMATED COUNT: 17.4 % (ref 11.5–14.5)
EST. GFR  (AFRICAN AMERICAN): >60 ML/MIN/1.73 M^2
EST. GFR  (NON AFRICAN AMERICAN): >60 ML/MIN/1.73 M^2
GLUCOSE SERPL-MCNC: 106 MG/DL (ref 70–110)
HCT VFR BLD AUTO: 23.8 % (ref 37–48.5)
HGB BLD-MCNC: 7.9 G/DL (ref 12–16)
IMM GRANULOCYTES # BLD AUTO: 0.17 K/UL (ref 0–0.04)
IMM GRANULOCYTES NFR BLD AUTO: 2.5 % (ref 0–0.5)
LYMPHOCYTES # BLD AUTO: 1.6 K/UL (ref 1–4.8)
LYMPHOCYTES NFR BLD: 23.6 % (ref 18–48)
MCH RBC QN AUTO: 30.6 PG (ref 27–31)
MCHC RBC AUTO-ENTMCNC: 33.2 G/DL (ref 32–36)
MCV RBC AUTO: 92 FL (ref 82–98)
MONOCYTES # BLD AUTO: 1.1 K/UL (ref 0.3–1)
MONOCYTES NFR BLD: 16.2 % (ref 4–15)
NEUTROPHILS # BLD AUTO: 3.8 K/UL (ref 1.8–7.7)
NEUTROPHILS NFR BLD: 55.3 % (ref 38–73)
NRBC BLD-RTO: 0 /100 WBC
PLATELET # BLD AUTO: 179 K/UL (ref 150–450)
PMV BLD AUTO: 10.6 FL (ref 9.2–12.9)
POCT GLUCOSE: 122 MG/DL (ref 70–110)
POCT GLUCOSE: 125 MG/DL (ref 70–110)
POCT GLUCOSE: 129 MG/DL (ref 70–110)
POCT GLUCOSE: 143 MG/DL (ref 70–110)
POTASSIUM SERPL-SCNC: 3.7 MMOL/L (ref 3.5–5.1)
PROT SERPL-MCNC: 5.1 G/DL (ref 6–8.4)
RBC # BLD AUTO: 2.58 M/UL (ref 4–5.4)
SODIUM SERPL-SCNC: 130 MMOL/L (ref 136–145)
WBC # BLD AUTO: 6.86 K/UL (ref 3.9–12.7)

## 2021-07-30 PROCEDURE — 99224 PR SUBSEQUENT OBSERVATION CARE,LEVEL I: ICD-10-PCS | Mod: GC,,, | Performed by: HOSPITALIST

## 2021-07-30 PROCEDURE — 80053 COMPREHEN METABOLIC PANEL: CPT | Performed by: STUDENT IN AN ORGANIZED HEALTH CARE EDUCATION/TRAINING PROGRAM

## 2021-07-30 PROCEDURE — 25000003 PHARM REV CODE 250: Performed by: STUDENT IN AN ORGANIZED HEALTH CARE EDUCATION/TRAINING PROGRAM

## 2021-07-30 PROCEDURE — 99226 PR SUBSEQUENT OBSERVATION CARE,LEVEL III: ICD-10-PCS | Mod: ,,, | Performed by: PHYSICIAN ASSISTANT

## 2021-07-30 PROCEDURE — 99222 1ST HOSP IP/OBS MODERATE 55: CPT | Mod: ,,, | Performed by: NURSE PRACTITIONER

## 2021-07-30 PROCEDURE — 25000242 PHARM REV CODE 250 ALT 637 W/ HCPCS: Performed by: STUDENT IN AN ORGANIZED HEALTH CARE EDUCATION/TRAINING PROGRAM

## 2021-07-30 PROCEDURE — 99224 PR SUBSEQUENT OBSERVATION CARE,LEVEL I: CPT | Mod: GC,,, | Performed by: HOSPITALIST

## 2021-07-30 PROCEDURE — 99226 PR SUBSEQUENT OBSERVATION CARE,LEVEL III: CPT | Mod: ,,, | Performed by: PHYSICIAN ASSISTANT

## 2021-07-30 PROCEDURE — 25000003 PHARM REV CODE 250: Performed by: PHYSICIAN ASSISTANT

## 2021-07-30 PROCEDURE — G0378 HOSPITAL OBSERVATION PER HR: HCPCS

## 2021-07-30 PROCEDURE — 85025 COMPLETE CBC W/AUTO DIFF WBC: CPT | Performed by: STUDENT IN AN ORGANIZED HEALTH CARE EDUCATION/TRAINING PROGRAM

## 2021-07-30 PROCEDURE — 99222 PR INITIAL HOSPITAL CARE,LEVL II: ICD-10-PCS | Mod: ,,, | Performed by: NURSE PRACTITIONER

## 2021-07-30 RX ORDER — ACETAMINOPHEN 325 MG/1
650 TABLET ORAL 3 TIMES DAILY
Status: DISCONTINUED | OUTPATIENT
Start: 2021-07-30 | End: 2021-08-05 | Stop reason: HOSPADM

## 2021-07-30 RX ORDER — LIDOCAINE 50 MG/G
1 PATCH TOPICAL
Status: DISCONTINUED | OUTPATIENT
Start: 2021-07-30 | End: 2021-07-30

## 2021-07-30 RX ORDER — GABAPENTIN 300 MG/1
300 CAPSULE ORAL 3 TIMES DAILY
Status: DISCONTINUED | OUTPATIENT
Start: 2021-07-30 | End: 2021-08-05 | Stop reason: HOSPADM

## 2021-07-30 RX ORDER — SODIUM CHLORIDE 9 MG/ML
INJECTION, SOLUTION INTRAVENOUS CONTINUOUS
Status: ACTIVE | OUTPATIENT
Start: 2021-07-30 | End: 2021-07-31

## 2021-07-30 RX ORDER — HYDROCODONE BITARTRATE AND ACETAMINOPHEN 5; 325 MG/1; MG/1
1 TABLET ORAL EVERY 6 HOURS PRN
Status: DISCONTINUED | OUTPATIENT
Start: 2021-07-30 | End: 2021-08-05 | Stop reason: HOSPADM

## 2021-07-30 RX ORDER — SENNOSIDES 8.6 MG/1
8.6 TABLET ORAL DAILY
Status: DISCONTINUED | OUTPATIENT
Start: 2021-07-30 | End: 2021-08-05 | Stop reason: HOSPADM

## 2021-07-30 RX ADMIN — ACETAMINOPHEN 650 MG: 325 TABLET ORAL at 08:07

## 2021-07-30 RX ADMIN — ACETAMINOPHEN 650 MG: 325 TABLET ORAL at 09:07

## 2021-07-30 RX ADMIN — GABAPENTIN 300 MG: 300 CAPSULE ORAL at 03:07

## 2021-07-30 RX ADMIN — GABAPENTIN 300 MG: 300 CAPSULE ORAL at 09:07

## 2021-07-30 RX ADMIN — METRONIDAZOLE 500 MG: 500 TABLET ORAL at 08:07

## 2021-07-30 RX ADMIN — SENNOSIDES 8.6 MG: 8.6 TABLET, FILM COATED ORAL at 12:07

## 2021-07-30 RX ADMIN — FLUTICASONE FUROATE AND VILANTEROL TRIFENATATE 1 PUFF: 100; 25 POWDER RESPIRATORY (INHALATION) at 09:07

## 2021-07-30 RX ADMIN — PANTOPRAZOLE SODIUM 40 MG: 20 TABLET, DELAYED RELEASE ORAL at 09:07

## 2021-07-30 RX ADMIN — ACETAMINOPHEN 650 MG: 325 TABLET ORAL at 03:07

## 2021-07-30 RX ADMIN — METOPROLOL SUCCINATE 50 MG: 50 TABLET, EXTENDED RELEASE ORAL at 09:07

## 2021-07-30 RX ADMIN — MELATONIN TAB 3 MG 6 MG: 3 TAB at 08:07

## 2021-07-30 RX ADMIN — CIPROFLOXACIN HYDROCHLORIDE 500 MG: 500 TABLET, FILM COATED ORAL at 09:07

## 2021-07-30 RX ADMIN — CIPROFLOXACIN HYDROCHLORIDE 500 MG: 500 TABLET, FILM COATED ORAL at 08:07

## 2021-07-30 RX ADMIN — LIDOCAINE 2 PATCH: 50 PATCH TOPICAL at 03:07

## 2021-07-30 RX ADMIN — GABAPENTIN 300 MG: 300 CAPSULE ORAL at 08:07

## 2021-07-30 RX ADMIN — CALCIUM CARBONATE (ANTACID) CHEW TAB 500 MG 1000 MG: 500 CHEW TAB at 08:07

## 2021-07-30 RX ADMIN — TIOTROPIUM BROMIDE INHALATION SPRAY 2 PUFF: 3.12 SPRAY, METERED RESPIRATORY (INHALATION) at 09:07

## 2021-07-30 RX ADMIN — METRONIDAZOLE 500 MG: 500 TABLET ORAL at 09:07

## 2021-07-30 RX ADMIN — CALCIUM CARBONATE (ANTACID) CHEW TAB 500 MG 1000 MG: 500 CHEW TAB at 09:07

## 2021-07-31 LAB
ALBUMIN SERPL BCP-MCNC: 1.8 G/DL (ref 3.5–5.2)
ALP SERPL-CCNC: 204 U/L (ref 55–135)
ALT SERPL W/O P-5'-P-CCNC: 25 U/L (ref 10–44)
ANION GAP SERPL CALC-SCNC: 10 MMOL/L (ref 8–16)
ANION GAP SERPL CALC-SCNC: 9 MMOL/L (ref 8–16)
AST SERPL-CCNC: 26 U/L (ref 10–40)
BASOPHILS # BLD AUTO: 0.02 K/UL (ref 0–0.2)
BASOPHILS NFR BLD: 0.3 % (ref 0–1.9)
BILIRUB SERPL-MCNC: 0.3 MG/DL (ref 0.1–1)
BUN SERPL-MCNC: 19 MG/DL (ref 8–23)
BUN SERPL-MCNC: 21 MG/DL (ref 8–23)
CALCIUM SERPL-MCNC: 9.4 MG/DL (ref 8.7–10.5)
CALCIUM SERPL-MCNC: 9.9 MG/DL (ref 8.7–10.5)
CHLORIDE SERPL-SCNC: 95 MMOL/L (ref 95–110)
CHLORIDE SERPL-SCNC: 96 MMOL/L (ref 95–110)
CO2 SERPL-SCNC: 24 MMOL/L (ref 23–29)
CO2 SERPL-SCNC: 26 MMOL/L (ref 23–29)
CREAT SERPL-MCNC: 0.8 MG/DL (ref 0.5–1.4)
CREAT SERPL-MCNC: 0.8 MG/DL (ref 0.5–1.4)
DIFFERENTIAL METHOD: ABNORMAL
EOSINOPHIL # BLD AUTO: 0.1 K/UL (ref 0–0.5)
EOSINOPHIL NFR BLD: 1.3 % (ref 0–8)
ERYTHROCYTE [DISTWIDTH] IN BLOOD BY AUTOMATED COUNT: 17.3 % (ref 11.5–14.5)
EST. GFR  (AFRICAN AMERICAN): >60 ML/MIN/1.73 M^2
EST. GFR  (AFRICAN AMERICAN): >60 ML/MIN/1.73 M^2
EST. GFR  (NON AFRICAN AMERICAN): >60 ML/MIN/1.73 M^2
EST. GFR  (NON AFRICAN AMERICAN): >60 ML/MIN/1.73 M^2
GLUCOSE SERPL-MCNC: 101 MG/DL (ref 70–110)
GLUCOSE SERPL-MCNC: 142 MG/DL (ref 70–110)
HCT VFR BLD AUTO: 25.6 % (ref 37–48.5)
HGB BLD-MCNC: 8.4 G/DL (ref 12–16)
IMM GRANULOCYTES # BLD AUTO: 0.13 K/UL (ref 0–0.04)
IMM GRANULOCYTES NFR BLD AUTO: 1.7 % (ref 0–0.5)
LYMPHOCYTES # BLD AUTO: 1.6 K/UL (ref 1–4.8)
LYMPHOCYTES NFR BLD: 21.1 % (ref 18–48)
MCH RBC QN AUTO: 30.5 PG (ref 27–31)
MCHC RBC AUTO-ENTMCNC: 32.8 G/DL (ref 32–36)
MCV RBC AUTO: 93 FL (ref 82–98)
MONOCYTES # BLD AUTO: 1.2 K/UL (ref 0.3–1)
MONOCYTES NFR BLD: 15.2 % (ref 4–15)
NEUTROPHILS # BLD AUTO: 4.6 K/UL (ref 1.8–7.7)
NEUTROPHILS NFR BLD: 60.4 % (ref 38–73)
NRBC BLD-RTO: 0 /100 WBC
PLATELET # BLD AUTO: 203 K/UL (ref 150–450)
PMV BLD AUTO: 10.9 FL (ref 9.2–12.9)
POCT GLUCOSE: 108 MG/DL (ref 70–110)
POCT GLUCOSE: 174 MG/DL (ref 70–110)
POTASSIUM SERPL-SCNC: 4.6 MMOL/L (ref 3.5–5.1)
POTASSIUM SERPL-SCNC: 5.3 MMOL/L (ref 3.5–5.1)
PROT SERPL-MCNC: 5.6 G/DL (ref 6–8.4)
RBC # BLD AUTO: 2.75 M/UL (ref 4–5.4)
SODIUM SERPL-SCNC: 128 MMOL/L (ref 136–145)
SODIUM SERPL-SCNC: 132 MMOL/L (ref 136–145)
WBC # BLD AUTO: 7.64 K/UL (ref 3.9–12.7)

## 2021-07-31 PROCEDURE — 94640 AIRWAY INHALATION TREATMENT: CPT

## 2021-07-31 PROCEDURE — 80053 COMPREHEN METABOLIC PANEL: CPT | Performed by: STUDENT IN AN ORGANIZED HEALTH CARE EDUCATION/TRAINING PROGRAM

## 2021-07-31 PROCEDURE — 99900035 HC TECH TIME PER 15 MIN (STAT)

## 2021-07-31 PROCEDURE — 99225 PR SUBSEQUENT OBSERVATION CARE,LEVEL II: ICD-10-PCS | Mod: GC,,, | Performed by: NEUROLOGICAL SURGERY

## 2021-07-31 PROCEDURE — 85025 COMPLETE CBC W/AUTO DIFF WBC: CPT | Performed by: STUDENT IN AN ORGANIZED HEALTH CARE EDUCATION/TRAINING PROGRAM

## 2021-07-31 PROCEDURE — 99225 PR SUBSEQUENT OBSERVATION CARE,LEVEL II: CPT | Mod: GC,,, | Performed by: HOSPITALIST

## 2021-07-31 PROCEDURE — 25000003 PHARM REV CODE 250: Performed by: STUDENT IN AN ORGANIZED HEALTH CARE EDUCATION/TRAINING PROGRAM

## 2021-07-31 PROCEDURE — 25000003 PHARM REV CODE 250: Performed by: PHYSICIAN ASSISTANT

## 2021-07-31 PROCEDURE — G0378 HOSPITAL OBSERVATION PER HR: HCPCS

## 2021-07-31 PROCEDURE — 99225 PR SUBSEQUENT OBSERVATION CARE,LEVEL II: ICD-10-PCS | Mod: GC,,, | Performed by: HOSPITALIST

## 2021-07-31 PROCEDURE — 99225 PR SUBSEQUENT OBSERVATION CARE,LEVEL II: CPT | Mod: GC,,, | Performed by: NEUROLOGICAL SURGERY

## 2021-07-31 PROCEDURE — 80048 BASIC METABOLIC PNL TOTAL CA: CPT | Performed by: STUDENT IN AN ORGANIZED HEALTH CARE EDUCATION/TRAINING PROGRAM

## 2021-07-31 PROCEDURE — 36415 COLL VENOUS BLD VENIPUNCTURE: CPT | Performed by: STUDENT IN AN ORGANIZED HEALTH CARE EDUCATION/TRAINING PROGRAM

## 2021-07-31 PROCEDURE — 27000221 HC OXYGEN, UP TO 24 HOURS

## 2021-07-31 RX ADMIN — ACETAMINOPHEN 650 MG: 325 TABLET ORAL at 09:07

## 2021-07-31 RX ADMIN — FLUTICASONE FUROATE AND VILANTEROL TRIFENATATE 1 PUFF: 100; 25 POWDER RESPIRATORY (INHALATION) at 07:07

## 2021-07-31 RX ADMIN — METRONIDAZOLE 500 MG: 500 TABLET ORAL at 09:07

## 2021-07-31 RX ADMIN — CALCIUM CARBONATE (ANTACID) CHEW TAB 500 MG 1000 MG: 500 CHEW TAB at 08:07

## 2021-07-31 RX ADMIN — ACETAMINOPHEN 650 MG: 325 TABLET ORAL at 08:07

## 2021-07-31 RX ADMIN — SENNOSIDES 8.6 MG: 8.6 TABLET, FILM COATED ORAL at 08:07

## 2021-07-31 RX ADMIN — GABAPENTIN 300 MG: 300 CAPSULE ORAL at 09:07

## 2021-07-31 RX ADMIN — CIPROFLOXACIN HYDROCHLORIDE 500 MG: 500 TABLET, FILM COATED ORAL at 08:07

## 2021-07-31 RX ADMIN — GABAPENTIN 300 MG: 300 CAPSULE ORAL at 08:07

## 2021-07-31 RX ADMIN — GABAPENTIN 300 MG: 300 CAPSULE ORAL at 03:07

## 2021-07-31 RX ADMIN — MELATONIN TAB 3 MG 6 MG: 3 TAB at 09:07

## 2021-07-31 RX ADMIN — ACETAMINOPHEN 650 MG: 325 TABLET ORAL at 03:07

## 2021-07-31 RX ADMIN — CALCIUM CARBONATE (ANTACID) CHEW TAB 500 MG 1000 MG: 500 CHEW TAB at 09:07

## 2021-07-31 RX ADMIN — METRONIDAZOLE 500 MG: 500 TABLET ORAL at 08:07

## 2021-07-31 RX ADMIN — TIOTROPIUM BROMIDE INHALATION SPRAY 2 PUFF: 3.12 SPRAY, METERED RESPIRATORY (INHALATION) at 07:07

## 2021-07-31 RX ADMIN — CIPROFLOXACIN HYDROCHLORIDE 500 MG: 500 TABLET, FILM COATED ORAL at 09:07

## 2021-07-31 RX ADMIN — PANTOPRAZOLE SODIUM 40 MG: 20 TABLET, DELAYED RELEASE ORAL at 08:07

## 2021-07-31 RX ADMIN — LIDOCAINE 2 PATCH: 50 PATCH TOPICAL at 03:07

## 2021-07-31 RX ADMIN — METOPROLOL SUCCINATE 50 MG: 50 TABLET, EXTENDED RELEASE ORAL at 08:07

## 2021-08-01 LAB
ALBUMIN SERPL BCP-MCNC: 1.6 G/DL (ref 3.5–5.2)
ALP SERPL-CCNC: 163 U/L (ref 55–135)
ALT SERPL W/O P-5'-P-CCNC: 18 U/L (ref 10–44)
ANION GAP SERPL CALC-SCNC: 11 MMOL/L (ref 8–16)
AST SERPL-CCNC: 16 U/L (ref 10–40)
BASOPHILS # BLD AUTO: 0.02 K/UL (ref 0–0.2)
BASOPHILS NFR BLD: 0.3 % (ref 0–1.9)
BILIRUB SERPL-MCNC: 0.3 MG/DL (ref 0.1–1)
BUN SERPL-MCNC: 19 MG/DL (ref 8–23)
CALCIUM SERPL-MCNC: 8.9 MG/DL (ref 8.7–10.5)
CHLORIDE SERPL-SCNC: 96 MMOL/L (ref 95–110)
CO2 SERPL-SCNC: 23 MMOL/L (ref 23–29)
CREAT SERPL-MCNC: 1 MG/DL (ref 0.5–1.4)
DIFFERENTIAL METHOD: ABNORMAL
EOSINOPHIL # BLD AUTO: 0.1 K/UL (ref 0–0.5)
EOSINOPHIL NFR BLD: 1.3 % (ref 0–8)
ERYTHROCYTE [DISTWIDTH] IN BLOOD BY AUTOMATED COUNT: 17 % (ref 11.5–14.5)
EST. GFR  (AFRICAN AMERICAN): >60 ML/MIN/1.73 M^2
EST. GFR  (NON AFRICAN AMERICAN): 56.8 ML/MIN/1.73 M^2
GLUCOSE SERPL-MCNC: 114 MG/DL (ref 70–110)
HCT VFR BLD AUTO: 22.4 % (ref 37–48.5)
HGB BLD-MCNC: 7.5 G/DL (ref 12–16)
IMM GRANULOCYTES # BLD AUTO: 0.09 K/UL (ref 0–0.04)
IMM GRANULOCYTES NFR BLD AUTO: 1.3 % (ref 0–0.5)
LYMPHOCYTES # BLD AUTO: 1.4 K/UL (ref 1–4.8)
LYMPHOCYTES NFR BLD: 20.3 % (ref 18–48)
MCH RBC QN AUTO: 30.7 PG (ref 27–31)
MCHC RBC AUTO-ENTMCNC: 33.5 G/DL (ref 32–36)
MCV RBC AUTO: 92 FL (ref 82–98)
MONOCYTES # BLD AUTO: 0.9 K/UL (ref 0.3–1)
MONOCYTES NFR BLD: 13.7 % (ref 4–15)
NEUTROPHILS # BLD AUTO: 4.2 K/UL (ref 1.8–7.7)
NEUTROPHILS NFR BLD: 63.1 % (ref 38–73)
NRBC BLD-RTO: 0 /100 WBC
PLATELET # BLD AUTO: 196 K/UL (ref 150–450)
PMV BLD AUTO: 10.7 FL (ref 9.2–12.9)
POCT GLUCOSE: 109 MG/DL (ref 70–110)
POCT GLUCOSE: 136 MG/DL (ref 70–110)
POCT GLUCOSE: 156 MG/DL (ref 70–110)
POTASSIUM SERPL-SCNC: 4.3 MMOL/L (ref 3.5–5.1)
PROT SERPL-MCNC: 4.9 G/DL (ref 6–8.4)
RBC # BLD AUTO: 2.44 M/UL (ref 4–5.4)
SODIUM SERPL-SCNC: 130 MMOL/L (ref 136–145)
WBC # BLD AUTO: 6.7 K/UL (ref 3.9–12.7)

## 2021-08-01 PROCEDURE — G0378 HOSPITAL OBSERVATION PER HR: HCPCS

## 2021-08-01 PROCEDURE — 25000003 PHARM REV CODE 250: Performed by: STUDENT IN AN ORGANIZED HEALTH CARE EDUCATION/TRAINING PROGRAM

## 2021-08-01 PROCEDURE — 99225 PR SUBSEQUENT OBSERVATION CARE,LEVEL II: ICD-10-PCS | Mod: GC,,, | Performed by: NEUROLOGICAL SURGERY

## 2021-08-01 PROCEDURE — 85025 COMPLETE CBC W/AUTO DIFF WBC: CPT | Performed by: STUDENT IN AN ORGANIZED HEALTH CARE EDUCATION/TRAINING PROGRAM

## 2021-08-01 PROCEDURE — 99225 PR SUBSEQUENT OBSERVATION CARE,LEVEL II: ICD-10-PCS | Mod: GC,,, | Performed by: HOSPITALIST

## 2021-08-01 PROCEDURE — 25000003 PHARM REV CODE 250: Performed by: PHYSICIAN ASSISTANT

## 2021-08-01 PROCEDURE — 99225 PR SUBSEQUENT OBSERVATION CARE,LEVEL II: CPT | Mod: GC,,, | Performed by: NEUROLOGICAL SURGERY

## 2021-08-01 PROCEDURE — 36415 COLL VENOUS BLD VENIPUNCTURE: CPT | Performed by: STUDENT IN AN ORGANIZED HEALTH CARE EDUCATION/TRAINING PROGRAM

## 2021-08-01 PROCEDURE — 80053 COMPREHEN METABOLIC PANEL: CPT | Performed by: STUDENT IN AN ORGANIZED HEALTH CARE EDUCATION/TRAINING PROGRAM

## 2021-08-01 PROCEDURE — 99225 PR SUBSEQUENT OBSERVATION CARE,LEVEL II: CPT | Mod: GC,,, | Performed by: HOSPITALIST

## 2021-08-01 RX ORDER — ATORVASTATIN CALCIUM 20 MG/1
40 TABLET, FILM COATED ORAL DAILY
Status: DISCONTINUED | OUTPATIENT
Start: 2021-08-02 | End: 2021-08-05 | Stop reason: HOSPADM

## 2021-08-01 RX ORDER — SODIUM CHLORIDE 9 MG/ML
INJECTION, SOLUTION INTRAVENOUS CONTINUOUS
Status: ACTIVE | OUTPATIENT
Start: 2021-08-01 | End: 2021-08-02

## 2021-08-01 RX ADMIN — MELATONIN TAB 3 MG 6 MG: 3 TAB at 09:08

## 2021-08-01 RX ADMIN — METRONIDAZOLE 500 MG: 500 TABLET ORAL at 09:08

## 2021-08-01 RX ADMIN — FLUTICASONE FUROATE AND VILANTEROL TRIFENATATE 1 PUFF: 100; 25 POWDER RESPIRATORY (INHALATION) at 09:08

## 2021-08-01 RX ADMIN — CALCIUM CARBONATE (ANTACID) CHEW TAB 500 MG 1000 MG: 500 CHEW TAB at 09:08

## 2021-08-01 RX ADMIN — ACETAMINOPHEN 650 MG: 325 TABLET ORAL at 03:08

## 2021-08-01 RX ADMIN — TIOTROPIUM BROMIDE INHALATION SPRAY 2 PUFF: 3.12 SPRAY, METERED RESPIRATORY (INHALATION) at 10:08

## 2021-08-01 RX ADMIN — SENNOSIDES 8.6 MG: 8.6 TABLET, FILM COATED ORAL at 09:08

## 2021-08-01 RX ADMIN — PANTOPRAZOLE SODIUM 40 MG: 20 TABLET, DELAYED RELEASE ORAL at 09:08

## 2021-08-01 RX ADMIN — GABAPENTIN 300 MG: 300 CAPSULE ORAL at 09:08

## 2021-08-01 RX ADMIN — METOPROLOL SUCCINATE 50 MG: 50 TABLET, EXTENDED RELEASE ORAL at 09:08

## 2021-08-01 RX ADMIN — ACETAMINOPHEN 650 MG: 325 TABLET ORAL at 09:08

## 2021-08-01 RX ADMIN — CIPROFLOXACIN HYDROCHLORIDE 500 MG: 500 TABLET, FILM COATED ORAL at 09:08

## 2021-08-01 RX ADMIN — SODIUM CHLORIDE: 0.9 INJECTION, SOLUTION INTRAVENOUS at 05:08

## 2021-08-01 RX ADMIN — GABAPENTIN 300 MG: 300 CAPSULE ORAL at 04:08

## 2021-08-01 RX ADMIN — HYDROCODONE BITARTRATE AND ACETAMINOPHEN 1 TABLET: 5; 325 TABLET ORAL at 09:08

## 2021-08-02 ENCOUNTER — DOCUMENT SCAN (OUTPATIENT)
Dept: HOME HEALTH SERVICES | Facility: HOSPITAL | Age: 71
End: 2021-08-02
Payer: MEDICARE

## 2021-08-02 LAB
ALBUMIN SERPL BCP-MCNC: 1.7 G/DL (ref 3.5–5.2)
ALP SERPL-CCNC: 154 U/L (ref 55–135)
ALT SERPL W/O P-5'-P-CCNC: 15 U/L (ref 10–44)
ANION GAP SERPL CALC-SCNC: 11 MMOL/L (ref 8–16)
AST SERPL-CCNC: 14 U/L (ref 10–40)
BASOPHILS # BLD AUTO: 0.03 K/UL (ref 0–0.2)
BASOPHILS NFR BLD: 0.5 % (ref 0–1.9)
BILIRUB SERPL-MCNC: 0.3 MG/DL (ref 0.1–1)
BUN SERPL-MCNC: 20 MG/DL (ref 8–23)
CALCIUM SERPL-MCNC: 9 MG/DL (ref 8.7–10.5)
CHLORIDE SERPL-SCNC: 97 MMOL/L (ref 95–110)
CO2 SERPL-SCNC: 21 MMOL/L (ref 23–29)
CREAT SERPL-MCNC: 1 MG/DL (ref 0.5–1.4)
DIFFERENTIAL METHOD: ABNORMAL
EOSINOPHIL # BLD AUTO: 0.1 K/UL (ref 0–0.5)
EOSINOPHIL NFR BLD: 1.1 % (ref 0–8)
ERYTHROCYTE [DISTWIDTH] IN BLOOD BY AUTOMATED COUNT: 17.3 % (ref 11.5–14.5)
EST. GFR  (AFRICAN AMERICAN): >60 ML/MIN/1.73 M^2
EST. GFR  (NON AFRICAN AMERICAN): 56.8 ML/MIN/1.73 M^2
GLUCOSE SERPL-MCNC: 97 MG/DL (ref 70–110)
HCT VFR BLD AUTO: 24 % (ref 37–48.5)
HGB BLD-MCNC: 8 G/DL (ref 12–16)
IMM GRANULOCYTES # BLD AUTO: 0.08 K/UL (ref 0–0.04)
IMM GRANULOCYTES NFR BLD AUTO: 1.3 % (ref 0–0.5)
LYMPHOCYTES # BLD AUTO: 1.2 K/UL (ref 1–4.8)
LYMPHOCYTES NFR BLD: 18.8 % (ref 18–48)
MCH RBC QN AUTO: 31 PG (ref 27–31)
MCHC RBC AUTO-ENTMCNC: 33.3 G/DL (ref 32–36)
MCV RBC AUTO: 93 FL (ref 82–98)
MONOCYTES # BLD AUTO: 0.8 K/UL (ref 0.3–1)
MONOCYTES NFR BLD: 13.3 % (ref 4–15)
NEUTROPHILS # BLD AUTO: 4 K/UL (ref 1.8–7.7)
NEUTROPHILS NFR BLD: 65 % (ref 38–73)
NRBC BLD-RTO: 0 /100 WBC
PLATELET # BLD AUTO: 214 K/UL (ref 150–450)
PMV BLD AUTO: 10.8 FL (ref 9.2–12.9)
POCT GLUCOSE: 153 MG/DL (ref 70–110)
POTASSIUM SERPL-SCNC: 4.1 MMOL/L (ref 3.5–5.1)
PROT SERPL-MCNC: 5.2 G/DL (ref 6–8.4)
RBC # BLD AUTO: 2.58 M/UL (ref 4–5.4)
SODIUM SERPL-SCNC: 129 MMOL/L (ref 136–145)
WBC # BLD AUTO: 6.22 K/UL (ref 3.9–12.7)

## 2021-08-02 PROCEDURE — 25000003 PHARM REV CODE 250: Performed by: STUDENT IN AN ORGANIZED HEALTH CARE EDUCATION/TRAINING PROGRAM

## 2021-08-02 PROCEDURE — 85025 COMPLETE CBC W/AUTO DIFF WBC: CPT | Performed by: STUDENT IN AN ORGANIZED HEALTH CARE EDUCATION/TRAINING PROGRAM

## 2021-08-02 PROCEDURE — 80053 COMPREHEN METABOLIC PANEL: CPT | Performed by: STUDENT IN AN ORGANIZED HEALTH CARE EDUCATION/TRAINING PROGRAM

## 2021-08-02 PROCEDURE — G0378 HOSPITAL OBSERVATION PER HR: HCPCS

## 2021-08-02 PROCEDURE — 97112 NEUROMUSCULAR REEDUCATION: CPT

## 2021-08-02 PROCEDURE — 97116 GAIT TRAINING THERAPY: CPT

## 2021-08-02 PROCEDURE — 99224 PR SUBSEQUENT OBSERVATION CARE,LEVEL I: ICD-10-PCS | Mod: GC,,, | Performed by: HOSPITALIST

## 2021-08-02 PROCEDURE — 99224 PR SUBSEQUENT OBSERVATION CARE,LEVEL I: CPT | Mod: GC,,, | Performed by: HOSPITALIST

## 2021-08-02 PROCEDURE — 99226 PR SUBSEQUENT OBSERVATION CARE,LEVEL III: ICD-10-PCS | Mod: ,,, | Performed by: PHYSICIAN ASSISTANT

## 2021-08-02 PROCEDURE — 25000003 PHARM REV CODE 250: Performed by: PHYSICIAN ASSISTANT

## 2021-08-02 PROCEDURE — 99226 PR SUBSEQUENT OBSERVATION CARE,LEVEL III: CPT | Mod: ,,, | Performed by: PHYSICIAN ASSISTANT

## 2021-08-02 RX ADMIN — METRONIDAZOLE 500 MG: 500 TABLET ORAL at 08:08

## 2021-08-02 RX ADMIN — SENNOSIDES 8.6 MG: 8.6 TABLET, FILM COATED ORAL at 08:08

## 2021-08-02 RX ADMIN — CALCIUM CARBONATE (ANTACID) CHEW TAB 500 MG 1000 MG: 500 CHEW TAB at 08:08

## 2021-08-02 RX ADMIN — GABAPENTIN 300 MG: 300 CAPSULE ORAL at 10:08

## 2021-08-02 RX ADMIN — PANTOPRAZOLE SODIUM 40 MG: 20 TABLET, DELAYED RELEASE ORAL at 08:08

## 2021-08-02 RX ADMIN — FLUTICASONE FUROATE AND VILANTEROL TRIFENATATE 1 PUFF: 100; 25 POWDER RESPIRATORY (INHALATION) at 08:08

## 2021-08-02 RX ADMIN — HYDROCODONE BITARTRATE AND ACETAMINOPHEN 1 TABLET: 5; 325 TABLET ORAL at 11:08

## 2021-08-02 RX ADMIN — CIPROFLOXACIN HYDROCHLORIDE 500 MG: 500 TABLET, FILM COATED ORAL at 08:08

## 2021-08-02 RX ADMIN — ACETAMINOPHEN 650 MG: 325 TABLET ORAL at 02:08

## 2021-08-02 RX ADMIN — TIOTROPIUM BROMIDE INHALATION SPRAY 2 PUFF: 3.12 SPRAY, METERED RESPIRATORY (INHALATION) at 08:08

## 2021-08-02 RX ADMIN — METOPROLOL SUCCINATE 50 MG: 50 TABLET, EXTENDED RELEASE ORAL at 08:08

## 2021-08-02 RX ADMIN — HYDROCODONE BITARTRATE AND ACETAMINOPHEN 1 TABLET: 5; 325 TABLET ORAL at 05:08

## 2021-08-02 RX ADMIN — GABAPENTIN 300 MG: 300 CAPSULE ORAL at 08:08

## 2021-08-02 RX ADMIN — ACETAMINOPHEN 650 MG: 325 TABLET ORAL at 08:08

## 2021-08-02 RX ADMIN — HYDROCODONE BITARTRATE AND ACETAMINOPHEN 1 TABLET: 5; 325 TABLET ORAL at 08:08

## 2021-08-02 RX ADMIN — MELATONIN TAB 3 MG 6 MG: 3 TAB at 08:08

## 2021-08-02 RX ADMIN — ATORVASTATIN CALCIUM 40 MG: 20 TABLET, FILM COATED ORAL at 08:08

## 2021-08-02 RX ADMIN — GABAPENTIN 300 MG: 300 CAPSULE ORAL at 02:08

## 2021-08-03 LAB
ALBUMIN SERPL BCP-MCNC: 1.8 G/DL (ref 3.5–5.2)
ALP SERPL-CCNC: 129 U/L (ref 55–135)
ALT SERPL W/O P-5'-P-CCNC: 12 U/L (ref 10–44)
ANION GAP SERPL CALC-SCNC: 6 MMOL/L (ref 8–16)
AST SERPL-CCNC: 13 U/L (ref 10–40)
BASOPHILS # BLD AUTO: 0.03 K/UL (ref 0–0.2)
BASOPHILS NFR BLD: 0.4 % (ref 0–1.9)
BILIRUB SERPL-MCNC: 0.3 MG/DL (ref 0.1–1)
BUN SERPL-MCNC: 21 MG/DL (ref 8–23)
CALCIUM SERPL-MCNC: 9.1 MG/DL (ref 8.7–10.5)
CHLORIDE SERPL-SCNC: 97 MMOL/L (ref 95–110)
CO2 SERPL-SCNC: 25 MMOL/L (ref 23–29)
CREAT SERPL-MCNC: 0.8 MG/DL (ref 0.5–1.4)
DIFFERENTIAL METHOD: ABNORMAL
EOSINOPHIL # BLD AUTO: 0.1 K/UL (ref 0–0.5)
EOSINOPHIL NFR BLD: 1 % (ref 0–8)
ERYTHROCYTE [DISTWIDTH] IN BLOOD BY AUTOMATED COUNT: 17.2 % (ref 11.5–14.5)
EST. GFR  (AFRICAN AMERICAN): >60 ML/MIN/1.73 M^2
EST. GFR  (NON AFRICAN AMERICAN): >60 ML/MIN/1.73 M^2
GLUCOSE SERPL-MCNC: 77 MG/DL (ref 70–110)
HCT VFR BLD AUTO: 22.2 % (ref 37–48.5)
HGB BLD-MCNC: 7.3 G/DL (ref 12–16)
IMM GRANULOCYTES # BLD AUTO: 0.08 K/UL (ref 0–0.04)
IMM GRANULOCYTES NFR BLD AUTO: 1.1 % (ref 0–0.5)
LYMPHOCYTES # BLD AUTO: 1.4 K/UL (ref 1–4.8)
LYMPHOCYTES NFR BLD: 19.5 % (ref 18–48)
MCH RBC QN AUTO: 30.5 PG (ref 27–31)
MCHC RBC AUTO-ENTMCNC: 32.9 G/DL (ref 32–36)
MCV RBC AUTO: 93 FL (ref 82–98)
MONOCYTES # BLD AUTO: 0.9 K/UL (ref 0.3–1)
MONOCYTES NFR BLD: 12.7 % (ref 4–15)
NEUTROPHILS # BLD AUTO: 4.6 K/UL (ref 1.8–7.7)
NEUTROPHILS NFR BLD: 65.3 % (ref 38–73)
NRBC BLD-RTO: 0 /100 WBC
PLATELET # BLD AUTO: 240 K/UL (ref 150–450)
PMV BLD AUTO: 10.7 FL (ref 9.2–12.9)
POCT GLUCOSE: 158 MG/DL (ref 70–110)
POCT GLUCOSE: 84 MG/DL (ref 70–110)
POTASSIUM SERPL-SCNC: 4.5 MMOL/L (ref 3.5–5.1)
PROT SERPL-MCNC: 5 G/DL (ref 6–8.4)
RBC # BLD AUTO: 2.39 M/UL (ref 4–5.4)
SODIUM SERPL-SCNC: 128 MMOL/L (ref 136–145)
WBC # BLD AUTO: 7.01 K/UL (ref 3.9–12.7)

## 2021-08-03 PROCEDURE — 97110 THERAPEUTIC EXERCISES: CPT

## 2021-08-03 PROCEDURE — 86580 TB INTRADERMAL TEST: CPT | Performed by: STUDENT IN AN ORGANIZED HEALTH CARE EDUCATION/TRAINING PROGRAM

## 2021-08-03 PROCEDURE — 97535 SELF CARE MNGMENT TRAINING: CPT

## 2021-08-03 PROCEDURE — 80053 COMPREHEN METABOLIC PANEL: CPT | Performed by: STUDENT IN AN ORGANIZED HEALTH CARE EDUCATION/TRAINING PROGRAM

## 2021-08-03 PROCEDURE — 36415 COLL VENOUS BLD VENIPUNCTURE: CPT | Performed by: STUDENT IN AN ORGANIZED HEALTH CARE EDUCATION/TRAINING PROGRAM

## 2021-08-03 PROCEDURE — 30200315 PPD INTRADERMAL TEST REV CODE 302: Performed by: STUDENT IN AN ORGANIZED HEALTH CARE EDUCATION/TRAINING PROGRAM

## 2021-08-03 PROCEDURE — 85025 COMPLETE CBC W/AUTO DIFF WBC: CPT | Performed by: STUDENT IN AN ORGANIZED HEALTH CARE EDUCATION/TRAINING PROGRAM

## 2021-08-03 PROCEDURE — 25000003 PHARM REV CODE 250: Performed by: PHYSICIAN ASSISTANT

## 2021-08-03 PROCEDURE — 99224 PR SUBSEQUENT OBSERVATION CARE,LEVEL I: CPT | Mod: ,,, | Performed by: PHYSICIAN ASSISTANT

## 2021-08-03 PROCEDURE — 25000003 PHARM REV CODE 250: Performed by: STUDENT IN AN ORGANIZED HEALTH CARE EDUCATION/TRAINING PROGRAM

## 2021-08-03 PROCEDURE — G0378 HOSPITAL OBSERVATION PER HR: HCPCS

## 2021-08-03 PROCEDURE — 99224 PR SUBSEQUENT OBSERVATION CARE,LEVEL I: ICD-10-PCS | Mod: ,,, | Performed by: PHYSICIAN ASSISTANT

## 2021-08-03 RX ADMIN — GABAPENTIN 300 MG: 300 CAPSULE ORAL at 08:08

## 2021-08-03 RX ADMIN — HYDROCODONE BITARTRATE AND ACETAMINOPHEN 1 TABLET: 5; 325 TABLET ORAL at 03:08

## 2021-08-03 RX ADMIN — ATORVASTATIN CALCIUM 40 MG: 20 TABLET, FILM COATED ORAL at 08:08

## 2021-08-03 RX ADMIN — ACETAMINOPHEN 650 MG: 325 TABLET ORAL at 03:08

## 2021-08-03 RX ADMIN — PANTOPRAZOLE SODIUM 40 MG: 20 TABLET, DELAYED RELEASE ORAL at 08:08

## 2021-08-03 RX ADMIN — METRONIDAZOLE 500 MG: 500 TABLET ORAL at 08:08

## 2021-08-03 RX ADMIN — GABAPENTIN 300 MG: 300 CAPSULE ORAL at 03:08

## 2021-08-03 RX ADMIN — CALCIUM CARBONATE (ANTACID) CHEW TAB 500 MG 1000 MG: 500 CHEW TAB at 08:08

## 2021-08-03 RX ADMIN — TUBERCULIN PURIFIED PROTEIN DERIVATIVE 5 UNITS: 5 INJECTION, SOLUTION INTRADERMAL at 12:08

## 2021-08-03 RX ADMIN — CIPROFLOXACIN HYDROCHLORIDE 500 MG: 500 TABLET, FILM COATED ORAL at 08:08

## 2021-08-03 RX ADMIN — ACETAMINOPHEN 650 MG: 325 TABLET ORAL at 08:08

## 2021-08-03 RX ADMIN — METOPROLOL SUCCINATE 50 MG: 50 TABLET, EXTENDED RELEASE ORAL at 08:08

## 2021-08-03 RX ADMIN — FLUTICASONE FUROATE AND VILANTEROL TRIFENATATE 1 PUFF: 100; 25 POWDER RESPIRATORY (INHALATION) at 08:08

## 2021-08-03 RX ADMIN — TIOTROPIUM BROMIDE INHALATION SPRAY 2 PUFF: 3.12 SPRAY, METERED RESPIRATORY (INHALATION) at 08:08

## 2021-08-03 RX ADMIN — MELATONIN TAB 3 MG 6 MG: 3 TAB at 08:08

## 2021-08-04 LAB
ALBUMIN SERPL BCP-MCNC: 1.7 G/DL (ref 3.5–5.2)
ALP SERPL-CCNC: 130 U/L (ref 55–135)
ALT SERPL W/O P-5'-P-CCNC: 10 U/L (ref 10–44)
ANION GAP SERPL CALC-SCNC: 5 MMOL/L (ref 8–16)
AST SERPL-CCNC: 12 U/L (ref 10–40)
BASOPHILS # BLD AUTO: 0.03 K/UL (ref 0–0.2)
BASOPHILS NFR BLD: 0.4 % (ref 0–1.9)
BILIRUB SERPL-MCNC: 0.2 MG/DL (ref 0.1–1)
BUN SERPL-MCNC: 20 MG/DL (ref 8–23)
CALCIUM SERPL-MCNC: 8.7 MG/DL (ref 8.7–10.5)
CHLORIDE SERPL-SCNC: 99 MMOL/L (ref 95–110)
CO2 SERPL-SCNC: 26 MMOL/L (ref 23–29)
CREAT SERPL-MCNC: 1.1 MG/DL (ref 0.5–1.4)
DIFFERENTIAL METHOD: ABNORMAL
EOSINOPHIL # BLD AUTO: 0.1 K/UL (ref 0–0.5)
EOSINOPHIL NFR BLD: 0.7 % (ref 0–8)
ERYTHROCYTE [DISTWIDTH] IN BLOOD BY AUTOMATED COUNT: 17.3 % (ref 11.5–14.5)
EST. GFR  (AFRICAN AMERICAN): 58.4 ML/MIN/1.73 M^2
EST. GFR  (NON AFRICAN AMERICAN): 50.6 ML/MIN/1.73 M^2
GLUCOSE SERPL-MCNC: 109 MG/DL (ref 70–110)
HCT VFR BLD AUTO: 21.9 % (ref 37–48.5)
HGB BLD-MCNC: 7.3 G/DL (ref 12–16)
IMM GRANULOCYTES # BLD AUTO: 0.05 K/UL (ref 0–0.04)
IMM GRANULOCYTES NFR BLD AUTO: 0.7 % (ref 0–0.5)
LYMPHOCYTES # BLD AUTO: 1 K/UL (ref 1–4.8)
LYMPHOCYTES NFR BLD: 13.1 % (ref 18–48)
MCH RBC QN AUTO: 30.8 PG (ref 27–31)
MCHC RBC AUTO-ENTMCNC: 33.3 G/DL (ref 32–36)
MCV RBC AUTO: 92 FL (ref 82–98)
MONOCYTES # BLD AUTO: 0.8 K/UL (ref 0.3–1)
MONOCYTES NFR BLD: 10.8 % (ref 4–15)
NEUTROPHILS # BLD AUTO: 5.7 K/UL (ref 1.8–7.7)
NEUTROPHILS NFR BLD: 74.3 % (ref 38–73)
NRBC BLD-RTO: 0 /100 WBC
PLATELET # BLD AUTO: 272 K/UL (ref 150–450)
PMV BLD AUTO: 10.5 FL (ref 9.2–12.9)
POTASSIUM SERPL-SCNC: 4.4 MMOL/L (ref 3.5–5.1)
PROT SERPL-MCNC: 4.9 G/DL (ref 6–8.4)
RBC # BLD AUTO: 2.37 M/UL (ref 4–5.4)
SODIUM SERPL-SCNC: 130 MMOL/L (ref 136–145)
WBC # BLD AUTO: 7.66 K/UL (ref 3.9–12.7)

## 2021-08-04 PROCEDURE — 97535 SELF CARE MNGMENT TRAINING: CPT

## 2021-08-04 PROCEDURE — 80053 COMPREHEN METABOLIC PANEL: CPT | Performed by: STUDENT IN AN ORGANIZED HEALTH CARE EDUCATION/TRAINING PROGRAM

## 2021-08-04 PROCEDURE — 99900035 HC TECH TIME PER 15 MIN (STAT)

## 2021-08-04 PROCEDURE — 99225 PR SUBSEQUENT OBSERVATION CARE,LEVEL II: ICD-10-PCS | Mod: ,,, | Performed by: PHYSICIAN ASSISTANT

## 2021-08-04 PROCEDURE — 97530 THERAPEUTIC ACTIVITIES: CPT

## 2021-08-04 PROCEDURE — 25000003 PHARM REV CODE 250: Performed by: STUDENT IN AN ORGANIZED HEALTH CARE EDUCATION/TRAINING PROGRAM

## 2021-08-04 PROCEDURE — 85025 COMPLETE CBC W/AUTO DIFF WBC: CPT | Performed by: STUDENT IN AN ORGANIZED HEALTH CARE EDUCATION/TRAINING PROGRAM

## 2021-08-04 PROCEDURE — 27000221 HC OXYGEN, UP TO 24 HOURS

## 2021-08-04 PROCEDURE — 25000003 PHARM REV CODE 250: Performed by: PHYSICIAN ASSISTANT

## 2021-08-04 PROCEDURE — G0378 HOSPITAL OBSERVATION PER HR: HCPCS

## 2021-08-04 PROCEDURE — 99225 PR SUBSEQUENT OBSERVATION CARE,LEVEL II: CPT | Mod: ,,, | Performed by: PHYSICIAN ASSISTANT

## 2021-08-04 RX ORDER — LIDOCAINE 50 MG/G
1 PATCH TOPICAL
Status: DISCONTINUED | OUTPATIENT
Start: 2021-08-04 | End: 2021-08-05 | Stop reason: HOSPADM

## 2021-08-04 RX ADMIN — LIDOCAINE 1 PATCH: 50 PATCH CUTANEOUS at 02:08

## 2021-08-04 RX ADMIN — HYDROCODONE BITARTRATE AND ACETAMINOPHEN 1 TABLET: 5; 325 TABLET ORAL at 09:08

## 2021-08-04 RX ADMIN — METRONIDAZOLE 500 MG: 500 TABLET ORAL at 09:08

## 2021-08-04 RX ADMIN — ACETAMINOPHEN 650 MG: 325 TABLET ORAL at 09:08

## 2021-08-04 RX ADMIN — METOPROLOL SUCCINATE 50 MG: 50 TABLET, EXTENDED RELEASE ORAL at 09:08

## 2021-08-04 RX ADMIN — ACETAMINOPHEN 650 MG: 325 TABLET ORAL at 03:08

## 2021-08-04 RX ADMIN — MELATONIN TAB 3 MG 6 MG: 3 TAB at 10:08

## 2021-08-04 RX ADMIN — ATORVASTATIN CALCIUM 40 MG: 20 TABLET, FILM COATED ORAL at 09:08

## 2021-08-04 RX ADMIN — ACETAMINOPHEN 650 MG: 325 TABLET ORAL at 10:08

## 2021-08-04 RX ADMIN — GABAPENTIN 300 MG: 300 CAPSULE ORAL at 09:08

## 2021-08-04 RX ADMIN — FLUTICASONE FUROATE AND VILANTEROL TRIFENATATE 1 PUFF: 100; 25 POWDER RESPIRATORY (INHALATION) at 09:08

## 2021-08-04 RX ADMIN — HYDROCODONE BITARTRATE AND ACETAMINOPHEN 1 TABLET: 5; 325 TABLET ORAL at 05:08

## 2021-08-04 RX ADMIN — CIPROFLOXACIN HYDROCHLORIDE 500 MG: 500 TABLET, FILM COATED ORAL at 10:08

## 2021-08-04 RX ADMIN — CALCIUM CARBONATE (ANTACID) CHEW TAB 500 MG 1000 MG: 500 CHEW TAB at 10:08

## 2021-08-04 RX ADMIN — METRONIDAZOLE 500 MG: 500 TABLET ORAL at 10:08

## 2021-08-04 RX ADMIN — PANTOPRAZOLE SODIUM 40 MG: 20 TABLET, DELAYED RELEASE ORAL at 09:08

## 2021-08-04 RX ADMIN — CIPROFLOXACIN HYDROCHLORIDE 500 MG: 500 TABLET, FILM COATED ORAL at 09:08

## 2021-08-04 RX ADMIN — TIOTROPIUM BROMIDE INHALATION SPRAY 2 PUFF: 3.12 SPRAY, METERED RESPIRATORY (INHALATION) at 09:08

## 2021-08-04 RX ADMIN — CALCIUM CARBONATE (ANTACID) CHEW TAB 500 MG 1000 MG: 500 CHEW TAB at 09:08

## 2021-08-04 RX ADMIN — GABAPENTIN 300 MG: 300 CAPSULE ORAL at 03:08

## 2021-08-05 ENCOUNTER — HOSPITAL ENCOUNTER (INPATIENT)
Facility: HOSPITAL | Age: 71
LOS: 33 days | Discharge: HOME HEALTH | DRG: 552 | End: 2021-09-07
Attending: HOSPITALIST | Admitting: STUDENT IN AN ORGANIZED HEALTH CARE EDUCATION/TRAINING PROGRAM
Payer: MEDICARE

## 2021-08-05 VITALS
WEIGHT: 210 LBS | DIASTOLIC BLOOD PRESSURE: 74 MMHG | RESPIRATION RATE: 18 BRPM | OXYGEN SATURATION: 96 % | TEMPERATURE: 98 F | BODY MASS INDEX: 32.96 KG/M2 | HEART RATE: 97 BPM | SYSTOLIC BLOOD PRESSURE: 123 MMHG | HEIGHT: 67 IN

## 2021-08-05 DIAGNOSIS — E78.5 HYPERLIPIDEMIA, UNSPECIFIED HYPERLIPIDEMIA TYPE: ICD-10-CM

## 2021-08-05 DIAGNOSIS — J96.11 CHRONIC RESPIRATORY FAILURE WITH HYPOXIA: ICD-10-CM

## 2021-08-05 DIAGNOSIS — J96.11 CHRONIC HYPOXEMIC RESPIRATORY FAILURE: ICD-10-CM

## 2021-08-05 DIAGNOSIS — I10 HYPERTENSION, BENIGN: ICD-10-CM

## 2021-08-05 DIAGNOSIS — J42 CHRONIC BRONCHITIS, UNSPECIFIED CHRONIC BRONCHITIS TYPE: ICD-10-CM

## 2021-08-05 DIAGNOSIS — M48.00 SPINAL STENOSIS, UNSPECIFIED SPINAL REGION: ICD-10-CM

## 2021-08-05 DIAGNOSIS — E53.8 B12 DEFICIENCY: ICD-10-CM

## 2021-08-05 DIAGNOSIS — K57.92 DIVERTICULITIS: Primary | ICD-10-CM

## 2021-08-05 DIAGNOSIS — I50.42 CHRONIC COMBINED SYSTOLIC AND DIASTOLIC CHF (CONGESTIVE HEART FAILURE): Chronic | ICD-10-CM

## 2021-08-05 LAB
ALBUMIN SERPL BCP-MCNC: 1.8 G/DL (ref 3.5–5.2)
ALP SERPL-CCNC: 124 U/L (ref 55–135)
ALT SERPL W/O P-5'-P-CCNC: 8 U/L (ref 10–44)
ANION GAP SERPL CALC-SCNC: 9 MMOL/L (ref 8–16)
AST SERPL-CCNC: 11 U/L (ref 10–40)
BASOPHILS # BLD AUTO: 0.02 K/UL (ref 0–0.2)
BASOPHILS NFR BLD: 0.3 % (ref 0–1.9)
BILIRUB SERPL-MCNC: 0.2 MG/DL (ref 0.1–1)
BUN SERPL-MCNC: 18 MG/DL (ref 8–23)
CALCIUM SERPL-MCNC: 8.6 MG/DL (ref 8.7–10.5)
CHLORIDE SERPL-SCNC: 93 MMOL/L (ref 95–110)
CO2 SERPL-SCNC: 25 MMOL/L (ref 23–29)
CREAT SERPL-MCNC: 0.8 MG/DL (ref 0.5–1.4)
DIFFERENTIAL METHOD: ABNORMAL
EOSINOPHIL # BLD AUTO: 0.1 K/UL (ref 0–0.5)
EOSINOPHIL NFR BLD: 1.2 % (ref 0–8)
ERYTHROCYTE [DISTWIDTH] IN BLOOD BY AUTOMATED COUNT: 17.1 % (ref 11.5–14.5)
EST. GFR  (AFRICAN AMERICAN): >60 ML/MIN/1.73 M^2
EST. GFR  (NON AFRICAN AMERICAN): >60 ML/MIN/1.73 M^2
GLUCOSE SERPL-MCNC: 72 MG/DL (ref 70–110)
HCT VFR BLD AUTO: 23.3 % (ref 37–48.5)
HGB BLD-MCNC: 7.5 G/DL (ref 12–16)
IMM GRANULOCYTES # BLD AUTO: 0.06 K/UL (ref 0–0.04)
IMM GRANULOCYTES NFR BLD AUTO: 0.9 % (ref 0–0.5)
LYMPHOCYTES # BLD AUTO: 1.3 K/UL (ref 1–4.8)
LYMPHOCYTES NFR BLD: 19.3 % (ref 18–48)
MCH RBC QN AUTO: 30.2 PG (ref 27–31)
MCHC RBC AUTO-ENTMCNC: 32.2 G/DL (ref 32–36)
MCV RBC AUTO: 94 FL (ref 82–98)
MONOCYTES # BLD AUTO: 0.8 K/UL (ref 0.3–1)
MONOCYTES NFR BLD: 11.5 % (ref 4–15)
NEUTROPHILS # BLD AUTO: 4.6 K/UL (ref 1.8–7.7)
NEUTROPHILS NFR BLD: 66.8 % (ref 38–73)
NRBC BLD-RTO: 0 /100 WBC
PLATELET # BLD AUTO: 305 K/UL (ref 150–450)
PMV BLD AUTO: 9.9 FL (ref 9.2–12.9)
POCT GLUCOSE: 120 MG/DL (ref 70–110)
POTASSIUM SERPL-SCNC: 4.1 MMOL/L (ref 3.5–5.1)
PROT SERPL-MCNC: 5.2 G/DL (ref 6–8.4)
RBC # BLD AUTO: 2.48 M/UL (ref 4–5.4)
SARS-COV-2 RNA RESP QL NAA+PROBE: NOT DETECTED
SODIUM SERPL-SCNC: 127 MMOL/L (ref 136–145)
WBC # BLD AUTO: 6.88 K/UL (ref 3.9–12.7)

## 2021-08-05 PROCEDURE — 11000004 HC SNF PRIVATE

## 2021-08-05 PROCEDURE — G0378 HOSPITAL OBSERVATION PER HR: HCPCS

## 2021-08-05 PROCEDURE — 99217 PR OBSERVATION CARE DISCHARGE: CPT | Mod: ,,, | Performed by: PHYSICIAN ASSISTANT

## 2021-08-05 PROCEDURE — 25000003 PHARM REV CODE 250: Performed by: PHYSICIAN ASSISTANT

## 2021-08-05 PROCEDURE — 85025 COMPLETE CBC W/AUTO DIFF WBC: CPT | Performed by: STUDENT IN AN ORGANIZED HEALTH CARE EDUCATION/TRAINING PROGRAM

## 2021-08-05 PROCEDURE — 97530 THERAPEUTIC ACTIVITIES: CPT | Mod: CO

## 2021-08-05 PROCEDURE — U0003 INFECTIOUS AGENT DETECTION BY NUCLEIC ACID (DNA OR RNA); SEVERE ACUTE RESPIRATORY SYNDROME CORONAVIRUS 2 (SARS-COV-2) (CORONAVIRUS DISEASE [COVID-19]), AMPLIFIED PROBE TECHNIQUE, MAKING USE OF HIGH THROUGHPUT TECHNOLOGIES AS DESCRIBED BY CMS-2020-01-R: HCPCS | Performed by: STUDENT IN AN ORGANIZED HEALTH CARE EDUCATION/TRAINING PROGRAM

## 2021-08-05 PROCEDURE — 25000003 PHARM REV CODE 250: Performed by: STUDENT IN AN ORGANIZED HEALTH CARE EDUCATION/TRAINING PROGRAM

## 2021-08-05 PROCEDURE — U0005 INFEC AGEN DETEC AMPLI PROBE: HCPCS | Performed by: STUDENT IN AN ORGANIZED HEALTH CARE EDUCATION/TRAINING PROGRAM

## 2021-08-05 PROCEDURE — 97535 SELF CARE MNGMENT TRAINING: CPT | Mod: CO

## 2021-08-05 PROCEDURE — 99217 PR OBSERVATION CARE DISCHARGE: ICD-10-PCS | Mod: ,,, | Performed by: PHYSICIAN ASSISTANT

## 2021-08-05 PROCEDURE — 80053 COMPREHEN METABOLIC PANEL: CPT | Performed by: STUDENT IN AN ORGANIZED HEALTH CARE EDUCATION/TRAINING PROGRAM

## 2021-08-05 RX ORDER — ACETAMINOPHEN 325 MG/1
650 TABLET ORAL 3 TIMES DAILY
Status: CANCELLED | OUTPATIENT
Start: 2021-08-05

## 2021-08-05 RX ORDER — INSULIN ASPART 100 [IU]/ML
0-5 INJECTION, SOLUTION INTRAVENOUS; SUBCUTANEOUS
Status: CANCELLED | OUTPATIENT
Start: 2021-08-05

## 2021-08-05 RX ORDER — IBUPROFEN 200 MG
16 TABLET ORAL
Status: CANCELLED | OUTPATIENT
Start: 2021-08-05

## 2021-08-05 RX ORDER — METRONIDAZOLE 500 MG/1
500 TABLET ORAL EVERY 12 HOURS
Status: COMPLETED | OUTPATIENT
Start: 2021-08-05 | End: 2021-08-07

## 2021-08-05 RX ORDER — AMOXICILLIN 250 MG
1 CAPSULE ORAL 2 TIMES DAILY
Status: DISCONTINUED | OUTPATIENT
Start: 2021-08-05 | End: 2021-08-10

## 2021-08-05 RX ORDER — IBUPROFEN 200 MG
16 TABLET ORAL
Status: DISCONTINUED | OUTPATIENT
Start: 2021-08-05 | End: 2021-09-07 | Stop reason: HOSPADM

## 2021-08-05 RX ORDER — GABAPENTIN 300 MG/1
300 CAPSULE ORAL 3 TIMES DAILY
Status: CANCELLED | OUTPATIENT
Start: 2021-08-05

## 2021-08-05 RX ORDER — CALCIUM CARBONATE 200(500)MG
500 TABLET,CHEWABLE ORAL 2 TIMES DAILY PRN
Status: CANCELLED | OUTPATIENT
Start: 2021-08-05

## 2021-08-05 RX ORDER — GABAPENTIN 300 MG/1
300 CAPSULE ORAL 3 TIMES DAILY
Status: DISCONTINUED | OUTPATIENT
Start: 2021-08-05 | End: 2021-09-07 | Stop reason: HOSPADM

## 2021-08-05 RX ORDER — FLUTICASONE PROPIONATE 50 MCG
1 SPRAY, SUSPENSION (ML) NASAL 2 TIMES DAILY PRN
Status: CANCELLED | OUTPATIENT
Start: 2021-08-05

## 2021-08-05 RX ORDER — ACETAMINOPHEN 325 MG/1
650 TABLET ORAL EVERY 6 HOURS PRN
Status: DISCONTINUED | OUTPATIENT
Start: 2021-08-05 | End: 2021-09-07 | Stop reason: HOSPADM

## 2021-08-05 RX ORDER — FLUTICASONE FUROATE AND VILANTEROL 100; 25 UG/1; UG/1
1 POWDER RESPIRATORY (INHALATION) DAILY
Status: CANCELLED | OUTPATIENT
Start: 2021-08-06

## 2021-08-05 RX ORDER — TALC
6 POWDER (GRAM) TOPICAL NIGHTLY
Status: DISCONTINUED | OUTPATIENT
Start: 2021-08-05 | End: 2021-09-07 | Stop reason: HOSPADM

## 2021-08-05 RX ORDER — ONDANSETRON 8 MG/1
8 TABLET, ORALLY DISINTEGRATING ORAL EVERY 6 HOURS PRN
Status: DISCONTINUED | OUTPATIENT
Start: 2021-08-05 | End: 2021-09-07 | Stop reason: HOSPADM

## 2021-08-05 RX ORDER — ONDANSETRON 8 MG/1
8 TABLET, ORALLY DISINTEGRATING ORAL EVERY 6 HOURS PRN
Status: CANCELLED | OUTPATIENT
Start: 2021-08-05

## 2021-08-05 RX ORDER — FLUTICASONE PROPIONATE 50 MCG
1 SPRAY, SUSPENSION (ML) NASAL 2 TIMES DAILY PRN
Status: DISCONTINUED | OUTPATIENT
Start: 2021-08-05 | End: 2021-09-07 | Stop reason: HOSPADM

## 2021-08-05 RX ORDER — INSULIN ASPART 100 [IU]/ML
0-5 INJECTION, SOLUTION INTRAVENOUS; SUBCUTANEOUS
Status: DISCONTINUED | OUTPATIENT
Start: 2021-08-05 | End: 2021-09-07 | Stop reason: HOSPADM

## 2021-08-05 RX ORDER — PANTOPRAZOLE SODIUM 20 MG/1
40 TABLET, DELAYED RELEASE ORAL DAILY
Status: CANCELLED | OUTPATIENT
Start: 2021-08-06

## 2021-08-05 RX ORDER — ATORVASTATIN CALCIUM 20 MG/1
40 TABLET, FILM COATED ORAL DAILY
Status: DISCONTINUED | OUTPATIENT
Start: 2021-08-06 | End: 2021-09-07 | Stop reason: HOSPADM

## 2021-08-05 RX ORDER — CALCIUM CARBONATE 200(500)MG
1000 TABLET,CHEWABLE ORAL 2 TIMES DAILY
Status: DISCONTINUED | OUTPATIENT
Start: 2021-08-05 | End: 2021-08-12

## 2021-08-05 RX ORDER — ALBUTEROL SULFATE 90 UG/1
2 AEROSOL, METERED RESPIRATORY (INHALATION) EVERY 6 HOURS PRN
Status: CANCELLED | OUTPATIENT
Start: 2021-08-05

## 2021-08-05 RX ORDER — SENNOSIDES 8.6 MG/1
8.6 TABLET ORAL DAILY
Status: DISCONTINUED | OUTPATIENT
Start: 2021-08-06 | End: 2021-08-06

## 2021-08-05 RX ORDER — CALCIUM CARBONATE 200(500)MG
1000 TABLET,CHEWABLE ORAL 2 TIMES DAILY
Status: CANCELLED | OUTPATIENT
Start: 2021-08-05

## 2021-08-05 RX ORDER — GLUCAGON 1 MG
1 KIT INJECTION
Status: CANCELLED | OUTPATIENT
Start: 2021-08-05

## 2021-08-05 RX ORDER — TALC
6 POWDER (GRAM) TOPICAL NIGHTLY PRN
Status: CANCELLED | OUTPATIENT
Start: 2021-08-05

## 2021-08-05 RX ORDER — PANTOPRAZOLE SODIUM 40 MG/1
40 TABLET, DELAYED RELEASE ORAL DAILY
Status: DISCONTINUED | OUTPATIENT
Start: 2021-08-06 | End: 2021-08-13

## 2021-08-05 RX ORDER — METRONIDAZOLE 500 MG/1
500 TABLET ORAL EVERY 12 HOURS
Status: CANCELLED | OUTPATIENT
Start: 2021-08-05 | End: 2021-08-07

## 2021-08-05 RX ORDER — GLUCAGON 1 MG
1 KIT INJECTION
Status: DISCONTINUED | OUTPATIENT
Start: 2021-08-05 | End: 2021-09-07 | Stop reason: HOSPADM

## 2021-08-05 RX ORDER — FLUTICASONE FUROATE AND VILANTEROL 100; 25 UG/1; UG/1
1 POWDER RESPIRATORY (INHALATION) DAILY
Status: DISCONTINUED | OUTPATIENT
Start: 2021-08-06 | End: 2021-09-07 | Stop reason: HOSPADM

## 2021-08-05 RX ORDER — ACETAMINOPHEN 325 MG/1
650 TABLET ORAL EVERY 6 HOURS PRN
Status: CANCELLED | OUTPATIENT
Start: 2021-08-05

## 2021-08-05 RX ORDER — LIDOCAINE 50 MG/G
1 PATCH TOPICAL
Status: DISCONTINUED | OUTPATIENT
Start: 2021-08-06 | End: 2021-09-07 | Stop reason: HOSPADM

## 2021-08-05 RX ORDER — CIPROFLOXACIN 500 MG/1
500 TABLET ORAL EVERY 12 HOURS
Status: COMPLETED | OUTPATIENT
Start: 2021-08-05 | End: 2021-08-07

## 2021-08-05 RX ORDER — POLYETHYLENE GLYCOL 3350 17 G/17G
17 POWDER, FOR SOLUTION ORAL 2 TIMES DAILY PRN
Status: CANCELLED | OUTPATIENT
Start: 2021-08-05

## 2021-08-05 RX ORDER — CALCIUM CARBONATE 200(500)MG
500 TABLET,CHEWABLE ORAL 2 TIMES DAILY PRN
Status: DISCONTINUED | OUTPATIENT
Start: 2021-08-05 | End: 2021-09-07 | Stop reason: HOSPADM

## 2021-08-05 RX ORDER — HYDROCODONE BITARTRATE AND ACETAMINOPHEN 5; 325 MG/1; MG/1
1 TABLET ORAL EVERY 6 HOURS PRN
Status: DISCONTINUED | OUTPATIENT
Start: 2021-08-05 | End: 2021-08-06

## 2021-08-05 RX ORDER — TALC
6 POWDER (GRAM) TOPICAL NIGHTLY PRN
Status: DISCONTINUED | OUTPATIENT
Start: 2021-08-05 | End: 2021-09-07 | Stop reason: HOSPADM

## 2021-08-05 RX ORDER — IBUPROFEN 200 MG
24 TABLET ORAL
Status: CANCELLED | OUTPATIENT
Start: 2021-08-05

## 2021-08-05 RX ORDER — AMOXICILLIN 250 MG
1 CAPSULE ORAL 2 TIMES DAILY
Status: CANCELLED | OUTPATIENT
Start: 2021-08-05

## 2021-08-05 RX ORDER — ALBUTEROL SULFATE 90 UG/1
2 AEROSOL, METERED RESPIRATORY (INHALATION) EVERY 6 HOURS PRN
Status: DISCONTINUED | OUTPATIENT
Start: 2021-08-05 | End: 2021-09-07 | Stop reason: HOSPADM

## 2021-08-05 RX ORDER — ATORVASTATIN CALCIUM 20 MG/1
40 TABLET, FILM COATED ORAL DAILY
Status: CANCELLED | OUTPATIENT
Start: 2021-08-06

## 2021-08-05 RX ORDER — LIDOCAINE 50 MG/G
1 PATCH TOPICAL
Status: CANCELLED | OUTPATIENT
Start: 2021-08-05

## 2021-08-05 RX ORDER — HYDROCODONE BITARTRATE AND ACETAMINOPHEN 5; 325 MG/1; MG/1
1 TABLET ORAL EVERY 6 HOURS PRN
Status: CANCELLED | OUTPATIENT
Start: 2021-08-05

## 2021-08-05 RX ORDER — METOPROLOL SUCCINATE 50 MG/1
50 TABLET, EXTENDED RELEASE ORAL DAILY
Status: DISCONTINUED | OUTPATIENT
Start: 2021-08-06 | End: 2021-09-07 | Stop reason: HOSPADM

## 2021-08-05 RX ORDER — POLYETHYLENE GLYCOL 3350 17 G/17G
17 POWDER, FOR SOLUTION ORAL 2 TIMES DAILY PRN
Status: DISCONTINUED | OUTPATIENT
Start: 2021-08-05 | End: 2021-09-07 | Stop reason: HOSPADM

## 2021-08-05 RX ORDER — METOPROLOL SUCCINATE 50 MG/1
50 TABLET, EXTENDED RELEASE ORAL DAILY
Status: CANCELLED | OUTPATIENT
Start: 2021-08-06

## 2021-08-05 RX ORDER — SENNOSIDES 8.6 MG/1
8.6 TABLET ORAL DAILY
Status: CANCELLED | OUTPATIENT
Start: 2021-08-06

## 2021-08-05 RX ORDER — TALC
6 POWDER (GRAM) TOPICAL NIGHTLY
Status: CANCELLED | OUTPATIENT
Start: 2021-08-05

## 2021-08-05 RX ORDER — IBUPROFEN 200 MG
24 TABLET ORAL
Status: DISCONTINUED | OUTPATIENT
Start: 2021-08-05 | End: 2021-09-07 | Stop reason: HOSPADM

## 2021-08-05 RX ORDER — ACETAMINOPHEN 325 MG/1
650 TABLET ORAL 3 TIMES DAILY
Status: DISCONTINUED | OUTPATIENT
Start: 2021-08-05 | End: 2021-08-06

## 2021-08-05 RX ORDER — CIPROFLOXACIN 500 MG/1
500 TABLET ORAL EVERY 12 HOURS
Status: CANCELLED | OUTPATIENT
Start: 2021-08-05 | End: 2021-08-07

## 2021-08-05 RX ADMIN — GABAPENTIN 300 MG: 300 CAPSULE ORAL at 09:08

## 2021-08-05 RX ADMIN — CALCIUM CARBONATE (ANTACID) CHEW TAB 500 MG 1000 MG: 500 CHEW TAB at 09:08

## 2021-08-05 RX ADMIN — LIDOCAINE 1 PATCH: 50 PATCH CUTANEOUS at 02:08

## 2021-08-05 RX ADMIN — CIPROFLOXACIN HYDROCHLORIDE 500 MG: 500 TABLET, FILM COATED ORAL at 09:08

## 2021-08-05 RX ADMIN — TIOTROPIUM BROMIDE INHALATION SPRAY 2 PUFF: 3.12 SPRAY, METERED RESPIRATORY (INHALATION) at 09:08

## 2021-08-05 RX ADMIN — MELATONIN TAB 3 MG 6 MG: 3 TAB at 09:08

## 2021-08-05 RX ADMIN — SODIUM CHLORIDE TAB 1 GM 2 G: 1 TAB at 01:08

## 2021-08-05 RX ADMIN — ACETAMINOPHEN 650 MG: 325 TABLET ORAL at 09:08

## 2021-08-05 RX ADMIN — CIPROFLOXACIN 500 MG: 500 TABLET, FILM COATED ORAL at 09:08

## 2021-08-05 RX ADMIN — METRONIDAZOLE 500 MG: 500 TABLET ORAL at 09:08

## 2021-08-05 RX ADMIN — HYDROCODONE BITARTRATE AND ACETAMINOPHEN 1 TABLET: 5; 325 TABLET ORAL at 09:08

## 2021-08-05 RX ADMIN — ATORVASTATIN CALCIUM 40 MG: 20 TABLET, FILM COATED ORAL at 09:08

## 2021-08-05 RX ADMIN — HYDROCODONE BITARTRATE AND ACETAMINOPHEN 1 TABLET: 5; 325 TABLET ORAL at 04:08

## 2021-08-05 RX ADMIN — GABAPENTIN 300 MG: 300 CAPSULE ORAL at 03:08

## 2021-08-05 RX ADMIN — PANTOPRAZOLE SODIUM 40 MG: 20 TABLET, DELAYED RELEASE ORAL at 09:08

## 2021-08-05 RX ADMIN — METOPROLOL SUCCINATE 50 MG: 50 TABLET, EXTENDED RELEASE ORAL at 09:08

## 2021-08-05 RX ADMIN — FLUTICASONE FUROATE AND VILANTEROL TRIFENATATE 1 PUFF: 100; 25 POWDER RESPIRATORY (INHALATION) at 09:08

## 2021-08-05 RX ADMIN — ACETAMINOPHEN 650 MG: 325 TABLET ORAL at 03:08

## 2021-08-05 RX ADMIN — HYDROCODONE BITARTRATE AND ACETAMINOPHEN 1 TABLET: 5; 325 TABLET ORAL at 02:08

## 2021-08-06 LAB
ALBUMIN SERPL BCP-MCNC: 1.8 G/DL (ref 3.5–5.2)
ALP SERPL-CCNC: 118 U/L (ref 55–135)
ALT SERPL W/O P-5'-P-CCNC: 6 U/L (ref 10–44)
ANION GAP SERPL CALC-SCNC: 11 MMOL/L (ref 8–16)
AST SERPL-CCNC: 15 U/L (ref 10–40)
BASOPHILS # BLD AUTO: 0.04 K/UL (ref 0–0.2)
BASOPHILS NFR BLD: 0.6 % (ref 0–1.9)
BILIRUB SERPL-MCNC: 0.2 MG/DL (ref 0.1–1)
BUN SERPL-MCNC: 20 MG/DL (ref 8–23)
CALCIUM SERPL-MCNC: 8.5 MG/DL (ref 8.7–10.5)
CHLORIDE SERPL-SCNC: 102 MMOL/L (ref 95–110)
CO2 SERPL-SCNC: 19 MMOL/L (ref 23–29)
CREAT SERPL-MCNC: 0.9 MG/DL (ref 0.5–1.4)
DIFFERENTIAL METHOD: ABNORMAL
EOSINOPHIL # BLD AUTO: 0.1 K/UL (ref 0–0.5)
EOSINOPHIL NFR BLD: 1.4 % (ref 0–8)
ERYTHROCYTE [DISTWIDTH] IN BLOOD BY AUTOMATED COUNT: 17.1 % (ref 11.5–14.5)
EST. GFR  (AFRICAN AMERICAN): >60 ML/MIN/1.73 M^2
EST. GFR  (NON AFRICAN AMERICAN): >60 ML/MIN/1.73 M^2
GLUCOSE SERPL-MCNC: 82 MG/DL (ref 70–110)
HCT VFR BLD AUTO: 22.7 % (ref 37–48.5)
HGB BLD-MCNC: 7.3 G/DL (ref 12–16)
IMM GRANULOCYTES # BLD AUTO: 0.05 K/UL (ref 0–0.04)
IMM GRANULOCYTES NFR BLD AUTO: 0.8 % (ref 0–0.5)
LYMPHOCYTES # BLD AUTO: 1.4 K/UL (ref 1–4.8)
LYMPHOCYTES NFR BLD: 20.6 % (ref 18–48)
MCH RBC QN AUTO: 30.3 PG (ref 27–31)
MCHC RBC AUTO-ENTMCNC: 32.2 G/DL (ref 32–36)
MCV RBC AUTO: 94 FL (ref 82–98)
MONOCYTES # BLD AUTO: 0.9 K/UL (ref 0.3–1)
MONOCYTES NFR BLD: 13 % (ref 4–15)
NEUTROPHILS # BLD AUTO: 4.2 K/UL (ref 1.8–7.7)
NEUTROPHILS NFR BLD: 63.6 % (ref 38–73)
NRBC BLD-RTO: 0 /100 WBC
PLATELET # BLD AUTO: 341 K/UL (ref 150–450)
PMV BLD AUTO: 10.3 FL (ref 9.2–12.9)
POCT GLUCOSE: 77 MG/DL (ref 70–110)
POCT GLUCOSE: 99 MG/DL (ref 70–110)
POTASSIUM SERPL-SCNC: 5.3 MMOL/L (ref 3.5–5.1)
PROT SERPL-MCNC: 5.3 G/DL (ref 6–8.4)
RBC # BLD AUTO: 2.41 M/UL (ref 4–5.4)
SODIUM SERPL-SCNC: 132 MMOL/L (ref 136–145)
WBC # BLD AUTO: 6.55 K/UL (ref 3.9–12.7)

## 2021-08-06 PROCEDURE — 25000242 PHARM REV CODE 250 ALT 637 W/ HCPCS: Performed by: PHYSICIAN ASSISTANT

## 2021-08-06 PROCEDURE — 25000003 PHARM REV CODE 250: Performed by: PHYSICIAN ASSISTANT

## 2021-08-06 PROCEDURE — 11000004 HC SNF PRIVATE

## 2021-08-06 PROCEDURE — 27000221 HC OXYGEN, UP TO 24 HOURS

## 2021-08-06 PROCEDURE — 36415 COLL VENOUS BLD VENIPUNCTURE: CPT | Performed by: PHYSICIAN ASSISTANT

## 2021-08-06 PROCEDURE — 97165 OT EVAL LOW COMPLEX 30 MIN: CPT

## 2021-08-06 PROCEDURE — 25000003 PHARM REV CODE 250: Performed by: NURSE PRACTITIONER

## 2021-08-06 PROCEDURE — 85025 COMPLETE CBC W/AUTO DIFF WBC: CPT | Performed by: PHYSICIAN ASSISTANT

## 2021-08-06 PROCEDURE — 97163 PT EVAL HIGH COMPLEX 45 MIN: CPT

## 2021-08-06 PROCEDURE — 97110 THERAPEUTIC EXERCISES: CPT

## 2021-08-06 PROCEDURE — 97535 SELF CARE MNGMENT TRAINING: CPT

## 2021-08-06 PROCEDURE — 99900035 HC TECH TIME PER 15 MIN (STAT)

## 2021-08-06 PROCEDURE — 80053 COMPREHEN METABOLIC PANEL: CPT | Performed by: PHYSICIAN ASSISTANT

## 2021-08-06 PROCEDURE — 94761 N-INVAS EAR/PLS OXIMETRY MLT: CPT

## 2021-08-06 RX ORDER — METHOCARBAMOL 500 MG/1
500 TABLET, FILM COATED ORAL 3 TIMES DAILY
Status: DISCONTINUED | OUTPATIENT
Start: 2021-08-06 | End: 2021-09-07 | Stop reason: HOSPADM

## 2021-08-06 RX ORDER — SODIUM BICARBONATE 650 MG/1
650 TABLET ORAL ONCE
Status: COMPLETED | OUTPATIENT
Start: 2021-08-06 | End: 2021-08-06

## 2021-08-06 RX ORDER — HYDROCODONE BITARTRATE AND ACETAMINOPHEN 10; 325 MG/1; MG/1
1 TABLET ORAL EVERY 6 HOURS PRN
Status: DISCONTINUED | OUTPATIENT
Start: 2021-08-06 | End: 2021-09-07 | Stop reason: HOSPADM

## 2021-08-06 RX ORDER — ACETAMINOPHEN 500 MG
1000 TABLET ORAL 3 TIMES DAILY
Status: DISCONTINUED | OUTPATIENT
Start: 2021-08-06 | End: 2021-08-09

## 2021-08-06 RX ORDER — HYDROCODONE BITARTRATE AND ACETAMINOPHEN 5; 325 MG/1; MG/1
1 TABLET ORAL EVERY 6 HOURS PRN
Status: DISCONTINUED | OUTPATIENT
Start: 2021-08-06 | End: 2021-09-07 | Stop reason: HOSPADM

## 2021-08-06 RX ADMIN — CALCIUM CARBONATE (ANTACID) CHEW TAB 500 MG 1000 MG: 500 CHEW TAB at 09:08

## 2021-08-06 RX ADMIN — METHOCARBAMOL 500 MG: 500 TABLET ORAL at 09:08

## 2021-08-06 RX ADMIN — GABAPENTIN 300 MG: 300 CAPSULE ORAL at 02:08

## 2021-08-06 RX ADMIN — ATORVASTATIN CALCIUM 40 MG: 20 TABLET, FILM COATED ORAL at 09:08

## 2021-08-06 RX ADMIN — MELATONIN TAB 3 MG 6 MG: 3 TAB at 09:08

## 2021-08-06 RX ADMIN — SODIUM POLYSTYRENE SULFONATE 15 G: 15 SUSPENSION ORAL; RECTAL at 02:08

## 2021-08-06 RX ADMIN — GABAPENTIN 300 MG: 300 CAPSULE ORAL at 09:08

## 2021-08-06 RX ADMIN — CIPROFLOXACIN 500 MG: 500 TABLET, FILM COATED ORAL at 09:08

## 2021-08-06 RX ADMIN — HYDROCODONE BITARTRATE AND ACETAMINOPHEN 1 TABLET: 5; 325 TABLET ORAL at 09:08

## 2021-08-06 RX ADMIN — ACETAMINOPHEN 500 MG: 500 TABLET, FILM COATED ORAL at 09:08

## 2021-08-06 RX ADMIN — SODIUM BICARBONATE 650 MG TABLET 650 MG: at 02:08

## 2021-08-06 RX ADMIN — FLUTICASONE FUROATE AND VILANTEROL TRIFENATATE 1 PUFF: 100; 25 POWDER RESPIRATORY (INHALATION) at 09:08

## 2021-08-06 RX ADMIN — LIDOCAINE 1 PATCH: 50 PATCH TOPICAL at 02:08

## 2021-08-06 RX ADMIN — METRONIDAZOLE 500 MG: 500 TABLET ORAL at 09:08

## 2021-08-06 RX ADMIN — METHOCARBAMOL 500 MG: 500 TABLET ORAL at 06:08

## 2021-08-06 RX ADMIN — PANTOPRAZOLE SODIUM 40 MG: 40 TABLET, DELAYED RELEASE ORAL at 09:08

## 2021-08-06 RX ADMIN — HYDROCODONE BITARTRATE AND ACETAMINOPHEN 1 TABLET: 10; 325 TABLET ORAL at 09:08

## 2021-08-06 RX ADMIN — TIOTROPIUM BROMIDE INHALATION SPRAY 2 PUFF: 3.12 SPRAY, METERED RESPIRATORY (INHALATION) at 09:08

## 2021-08-06 RX ADMIN — ACETAMINOPHEN 1000 MG: 500 TABLET, FILM COATED ORAL at 02:08

## 2021-08-07 PROCEDURE — 97116 GAIT TRAINING THERAPY: CPT | Mod: CQ

## 2021-08-07 PROCEDURE — 25000003 PHARM REV CODE 250: Performed by: PHYSICIAN ASSISTANT

## 2021-08-07 PROCEDURE — 11000004 HC SNF PRIVATE

## 2021-08-07 PROCEDURE — 97110 THERAPEUTIC EXERCISES: CPT | Mod: CQ

## 2021-08-07 PROCEDURE — 27000221 HC OXYGEN, UP TO 24 HOURS

## 2021-08-07 PROCEDURE — 25000003 PHARM REV CODE 250: Performed by: NURSE PRACTITIONER

## 2021-08-07 PROCEDURE — 94761 N-INVAS EAR/PLS OXIMETRY MLT: CPT

## 2021-08-07 RX ADMIN — METHOCARBAMOL 500 MG: 500 TABLET ORAL at 02:08

## 2021-08-07 RX ADMIN — TIOTROPIUM BROMIDE INHALATION SPRAY 2 PUFF: 3.12 SPRAY, METERED RESPIRATORY (INHALATION) at 09:08

## 2021-08-07 RX ADMIN — FLUTICASONE FUROATE AND VILANTEROL TRIFENATATE 1 PUFF: 100; 25 POWDER RESPIRATORY (INHALATION) at 09:08

## 2021-08-07 RX ADMIN — CALCIUM CARBONATE (ANTACID) CHEW TAB 500 MG 1000 MG: 500 CHEW TAB at 09:08

## 2021-08-07 RX ADMIN — METHOCARBAMOL 500 MG: 500 TABLET ORAL at 09:08

## 2021-08-07 RX ADMIN — ACETAMINOPHEN 1000 MG: 500 TABLET, FILM COATED ORAL at 09:08

## 2021-08-07 RX ADMIN — PANTOPRAZOLE SODIUM 40 MG: 40 TABLET, DELAYED RELEASE ORAL at 09:08

## 2021-08-07 RX ADMIN — GABAPENTIN 300 MG: 300 CAPSULE ORAL at 02:08

## 2021-08-07 RX ADMIN — HYDROCODONE BITARTRATE AND ACETAMINOPHEN 1 TABLET: 5; 325 TABLET ORAL at 04:08

## 2021-08-07 RX ADMIN — ATORVASTATIN CALCIUM 40 MG: 20 TABLET, FILM COATED ORAL at 09:08

## 2021-08-07 RX ADMIN — GABAPENTIN 300 MG: 300 CAPSULE ORAL at 09:08

## 2021-08-07 RX ADMIN — ACETAMINOPHEN 1000 MG: 500 TABLET, FILM COATED ORAL at 02:08

## 2021-08-07 RX ADMIN — METRONIDAZOLE 500 MG: 500 TABLET ORAL at 09:08

## 2021-08-07 RX ADMIN — LIDOCAINE 1 PATCH: 50 PATCH TOPICAL at 02:08

## 2021-08-07 RX ADMIN — HYDROCODONE BITARTRATE AND ACETAMINOPHEN 1 TABLET: 10; 325 TABLET ORAL at 03:08

## 2021-08-07 RX ADMIN — CIPROFLOXACIN 500 MG: 500 TABLET, FILM COATED ORAL at 09:08

## 2021-08-07 RX ADMIN — MELATONIN TAB 3 MG 6 MG: 3 TAB at 09:08

## 2021-08-07 RX ADMIN — HYDROCODONE BITARTRATE AND ACETAMINOPHEN 1 TABLET: 5; 325 TABLET ORAL at 09:08

## 2021-08-07 RX ADMIN — METOPROLOL SUCCINATE 50 MG: 50 TABLET, EXTENDED RELEASE ORAL at 09:08

## 2021-08-08 LAB — POCT GLUCOSE: 133 MG/DL (ref 70–110)

## 2021-08-08 PROCEDURE — 25000003 PHARM REV CODE 250: Performed by: NURSE PRACTITIONER

## 2021-08-08 PROCEDURE — 27000221 HC OXYGEN, UP TO 24 HOURS

## 2021-08-08 PROCEDURE — 99900035 HC TECH TIME PER 15 MIN (STAT)

## 2021-08-08 PROCEDURE — 94761 N-INVAS EAR/PLS OXIMETRY MLT: CPT

## 2021-08-08 PROCEDURE — 11000004 HC SNF PRIVATE

## 2021-08-08 PROCEDURE — 25000003 PHARM REV CODE 250: Performed by: PHYSICIAN ASSISTANT

## 2021-08-08 PROCEDURE — 97535 SELF CARE MNGMENT TRAINING: CPT | Mod: CO

## 2021-08-08 RX ADMIN — GABAPENTIN 300 MG: 300 CAPSULE ORAL at 09:08

## 2021-08-08 RX ADMIN — METHOCARBAMOL 500 MG: 500 TABLET ORAL at 09:08

## 2021-08-08 RX ADMIN — MELATONIN TAB 3 MG 6 MG: 3 TAB at 09:08

## 2021-08-08 RX ADMIN — LIDOCAINE 1 PATCH: 50 PATCH TOPICAL at 03:08

## 2021-08-08 RX ADMIN — DOCUSATE SODIUM 50 MG AND SENNOSIDES 8.6 MG 1 TABLET: 8.6; 5 TABLET, FILM COATED ORAL at 08:08

## 2021-08-08 RX ADMIN — ACETAMINOPHEN 1000 MG: 500 TABLET, FILM COATED ORAL at 08:08

## 2021-08-08 RX ADMIN — GABAPENTIN 300 MG: 300 CAPSULE ORAL at 08:08

## 2021-08-08 RX ADMIN — CALCIUM CARBONATE (ANTACID) CHEW TAB 500 MG 1000 MG: 500 CHEW TAB at 08:08

## 2021-08-08 RX ADMIN — GABAPENTIN 300 MG: 300 CAPSULE ORAL at 03:08

## 2021-08-08 RX ADMIN — HYDROCODONE BITARTRATE AND ACETAMINOPHEN 1 TABLET: 10; 325 TABLET ORAL at 04:08

## 2021-08-08 RX ADMIN — TIOTROPIUM BROMIDE INHALATION SPRAY 2 PUFF: 3.12 SPRAY, METERED RESPIRATORY (INHALATION) at 08:08

## 2021-08-08 RX ADMIN — HYDROCODONE BITARTRATE AND ACETAMINOPHEN 1 TABLET: 10; 325 TABLET ORAL at 03:08

## 2021-08-08 RX ADMIN — CALCIUM CARBONATE (ANTACID) CHEW TAB 500 MG 1000 MG: 500 CHEW TAB at 09:08

## 2021-08-08 RX ADMIN — METHOCARBAMOL 500 MG: 500 TABLET ORAL at 03:08

## 2021-08-08 RX ADMIN — METOPROLOL SUCCINATE 50 MG: 50 TABLET, EXTENDED RELEASE ORAL at 08:08

## 2021-08-08 RX ADMIN — ATORVASTATIN CALCIUM 40 MG: 20 TABLET, FILM COATED ORAL at 08:08

## 2021-08-08 RX ADMIN — PANTOPRAZOLE SODIUM 40 MG: 40 TABLET, DELAYED RELEASE ORAL at 08:08

## 2021-08-08 RX ADMIN — ACETAMINOPHEN 1000 MG: 500 TABLET, FILM COATED ORAL at 09:08

## 2021-08-08 RX ADMIN — METHOCARBAMOL 500 MG: 500 TABLET ORAL at 08:08

## 2021-08-08 RX ADMIN — FLUTICASONE FUROATE AND VILANTEROL TRIFENATATE 1 PUFF: 100; 25 POWDER RESPIRATORY (INHALATION) at 08:08

## 2021-08-09 DIAGNOSIS — Z20.822 ENCOUNTER FOR LABORATORY TESTING FOR COVID-19 VIRUS: ICD-10-CM

## 2021-08-09 LAB
ABO + RH BLD: NORMAL
ALBUMIN SERPL BCP-MCNC: 1.8 G/DL (ref 3.5–5.2)
ALP SERPL-CCNC: 89 U/L (ref 55–135)
ALT SERPL W/O P-5'-P-CCNC: 5 U/L (ref 10–44)
ANION GAP SERPL CALC-SCNC: 6 MMOL/L (ref 8–16)
AST SERPL-CCNC: 15 U/L (ref 10–40)
BASOPHILS # BLD AUTO: 0.05 K/UL (ref 0–0.2)
BASOPHILS # BLD AUTO: 0.06 K/UL (ref 0–0.2)
BASOPHILS NFR BLD: 0.9 % (ref 0–1.9)
BASOPHILS NFR BLD: 1.1 % (ref 0–1.9)
BILIRUB SERPL-MCNC: 0.2 MG/DL (ref 0.1–1)
BLD GP AB SCN CELLS X3 SERPL QL: NORMAL
BUN SERPL-MCNC: 20 MG/DL (ref 8–23)
CALCIUM SERPL-MCNC: 7.3 MG/DL (ref 8.7–10.5)
CHLORIDE SERPL-SCNC: 101 MMOL/L (ref 95–110)
CO2 SERPL-SCNC: 23 MMOL/L (ref 23–29)
CREAT SERPL-MCNC: 0.8 MG/DL (ref 0.5–1.4)
DIFFERENTIAL METHOD: ABNORMAL
DIFFERENTIAL METHOD: ABNORMAL
EOSINOPHIL # BLD AUTO: 0.1 K/UL (ref 0–0.5)
EOSINOPHIL # BLD AUTO: 0.2 K/UL (ref 0–0.5)
EOSINOPHIL NFR BLD: 2.1 % (ref 0–8)
EOSINOPHIL NFR BLD: 3.3 % (ref 0–8)
ERYTHROCYTE [DISTWIDTH] IN BLOOD BY AUTOMATED COUNT: 17 % (ref 11.5–14.5)
ERYTHROCYTE [DISTWIDTH] IN BLOOD BY AUTOMATED COUNT: 17.3 % (ref 11.5–14.5)
EST. GFR  (AFRICAN AMERICAN): >60 ML/MIN/1.73 M^2
EST. GFR  (NON AFRICAN AMERICAN): >60 ML/MIN/1.73 M^2
FERRITIN SERPL-MCNC: 374 NG/ML (ref 20–300)
FOLATE SERPL-MCNC: 9 NG/ML (ref 4–24)
GLUCOSE SERPL-MCNC: 75 MG/DL (ref 70–110)
HCT VFR BLD AUTO: 21.1 % (ref 37–48.5)
HCT VFR BLD AUTO: 22 % (ref 37–48.5)
HGB BLD-MCNC: 6.8 G/DL (ref 12–16)
HGB BLD-MCNC: 7 G/DL (ref 12–16)
IMM GRANULOCYTES # BLD AUTO: 0.03 K/UL (ref 0–0.04)
IMM GRANULOCYTES # BLD AUTO: 0.03 K/UL (ref 0–0.04)
IMM GRANULOCYTES NFR BLD AUTO: 0.5 % (ref 0–0.5)
IMM GRANULOCYTES NFR BLD AUTO: 0.7 % (ref 0–0.5)
IRON SERPL-MCNC: 25 UG/DL (ref 30–160)
LYMPHOCYTES # BLD AUTO: 1.2 K/UL (ref 1–4.8)
LYMPHOCYTES # BLD AUTO: 1.2 K/UL (ref 1–4.8)
LYMPHOCYTES NFR BLD: 18.3 % (ref 18–48)
LYMPHOCYTES NFR BLD: 27.4 % (ref 18–48)
MAGNESIUM SERPL-MCNC: <0.7 MG/DL (ref 1.6–2.6)
MCH RBC QN AUTO: 29.4 PG (ref 27–31)
MCH RBC QN AUTO: 29.6 PG (ref 27–31)
MCHC RBC AUTO-ENTMCNC: 31.8 G/DL (ref 32–36)
MCHC RBC AUTO-ENTMCNC: 32.2 G/DL (ref 32–36)
MCV RBC AUTO: 92 FL (ref 82–98)
MCV RBC AUTO: 92 FL (ref 82–98)
MONOCYTES # BLD AUTO: 0.7 K/UL (ref 0.3–1)
MONOCYTES # BLD AUTO: 0.8 K/UL (ref 0.3–1)
MONOCYTES NFR BLD: 12.2 % (ref 4–15)
MONOCYTES NFR BLD: 14.9 % (ref 4–15)
NEUTROPHILS # BLD AUTO: 2.4 K/UL (ref 1.8–7.7)
NEUTROPHILS # BLD AUTO: 4.3 K/UL (ref 1.8–7.7)
NEUTROPHILS NFR BLD: 52.6 % (ref 38–73)
NEUTROPHILS NFR BLD: 66 % (ref 38–73)
NRBC BLD-RTO: 0 /100 WBC
NRBC BLD-RTO: 0 /100 WBC
PHOSPHATE SERPL-MCNC: 5.2 MG/DL (ref 2.7–4.5)
PLATELET # BLD AUTO: 355 K/UL (ref 150–450)
PLATELET # BLD AUTO: 364 K/UL (ref 150–450)
PMV BLD AUTO: 9.9 FL (ref 9.2–12.9)
PMV BLD AUTO: 9.9 FL (ref 9.2–12.9)
POCT GLUCOSE: 120 MG/DL (ref 70–110)
POTASSIUM SERPL-SCNC: 4.5 MMOL/L (ref 3.5–5.1)
PROT SERPL-MCNC: 5.1 G/DL (ref 6–8.4)
RBC # BLD AUTO: 2.3 M/UL (ref 4–5.4)
RBC # BLD AUTO: 2.38 M/UL (ref 4–5.4)
SATURATED IRON: 18 % (ref 20–50)
SODIUM SERPL-SCNC: 130 MMOL/L (ref 136–145)
TOTAL IRON BINDING CAPACITY: 136 UG/DL (ref 250–450)
TRANSFERRIN SERPL-MCNC: 92 MG/DL (ref 200–375)
WBC # BLD AUTO: 4.49 K/UL (ref 3.9–12.7)
WBC # BLD AUTO: 6.57 K/UL (ref 3.9–12.7)

## 2021-08-09 PROCEDURE — 82728 ASSAY OF FERRITIN: CPT | Performed by: NURSE PRACTITIONER

## 2021-08-09 PROCEDURE — 85025 COMPLETE CBC W/AUTO DIFF WBC: CPT | Mod: 91 | Performed by: NURSE PRACTITIONER

## 2021-08-09 PROCEDURE — 84100 ASSAY OF PHOSPHORUS: CPT | Performed by: PHYSICIAN ASSISTANT

## 2021-08-09 PROCEDURE — 94761 N-INVAS EAR/PLS OXIMETRY MLT: CPT

## 2021-08-09 PROCEDURE — 25000003 PHARM REV CODE 250: Performed by: NURSE PRACTITIONER

## 2021-08-09 PROCEDURE — 85025 COMPLETE CBC W/AUTO DIFF WBC: CPT | Performed by: PHYSICIAN ASSISTANT

## 2021-08-09 PROCEDURE — 25000003 PHARM REV CODE 250: Performed by: PHYSICIAN ASSISTANT

## 2021-08-09 PROCEDURE — 83735 ASSAY OF MAGNESIUM: CPT | Performed by: PHYSICIAN ASSISTANT

## 2021-08-09 PROCEDURE — 27000221 HC OXYGEN, UP TO 24 HOURS

## 2021-08-09 PROCEDURE — 97110 THERAPEUTIC EXERCISES: CPT | Mod: CO

## 2021-08-09 PROCEDURE — 97530 THERAPEUTIC ACTIVITIES: CPT | Mod: CQ

## 2021-08-09 PROCEDURE — 82746 ASSAY OF FOLIC ACID SERUM: CPT | Performed by: NURSE PRACTITIONER

## 2021-08-09 PROCEDURE — 97535 SELF CARE MNGMENT TRAINING: CPT | Mod: CO

## 2021-08-09 PROCEDURE — 11000004 HC SNF PRIVATE

## 2021-08-09 PROCEDURE — 99900035 HC TECH TIME PER 15 MIN (STAT)

## 2021-08-09 PROCEDURE — 36415 COLL VENOUS BLD VENIPUNCTURE: CPT | Performed by: NURSE PRACTITIONER

## 2021-08-09 PROCEDURE — 80053 COMPREHEN METABOLIC PANEL: CPT | Performed by: NURSE PRACTITIONER

## 2021-08-09 PROCEDURE — 97116 GAIT TRAINING THERAPY: CPT | Mod: CQ

## 2021-08-09 PROCEDURE — 86900 BLOOD TYPING SEROLOGIC ABO: CPT | Performed by: NURSE PRACTITIONER

## 2021-08-09 PROCEDURE — 83540 ASSAY OF IRON: CPT | Performed by: NURSE PRACTITIONER

## 2021-08-09 RX ORDER — DICLOFENAC SODIUM 10 MG/G
4 GEL TOPICAL 3 TIMES DAILY
Status: DISCONTINUED | OUTPATIENT
Start: 2021-08-09 | End: 2021-09-07 | Stop reason: HOSPADM

## 2021-08-09 RX ORDER — LANOLIN ALCOHOL/MO/W.PET/CERES
800 CREAM (GRAM) TOPICAL 2 TIMES DAILY
Status: DISCONTINUED | OUTPATIENT
Start: 2021-08-09 | End: 2021-09-01

## 2021-08-09 RX ORDER — ASCORBIC ACID 250 MG
250 TABLET ORAL DAILY
Status: DISCONTINUED | OUTPATIENT
Start: 2021-08-10 | End: 2021-09-07 | Stop reason: HOSPADM

## 2021-08-09 RX ORDER — LANOLIN ALCOHOL/MO/W.PET/CERES
800 CREAM (GRAM) TOPICAL 2 TIMES DAILY
Status: DISCONTINUED | OUTPATIENT
Start: 2021-08-09 | End: 2021-08-09

## 2021-08-09 RX ORDER — FERROUS SULFATE 325(65) MG
325 TABLET, DELAYED RELEASE (ENTERIC COATED) ORAL DAILY
Status: DISCONTINUED | OUTPATIENT
Start: 2021-08-10 | End: 2021-08-25

## 2021-08-09 RX ORDER — ACETAMINOPHEN 500 MG
1000 TABLET ORAL EVERY 8 HOURS PRN
Status: DISCONTINUED | OUTPATIENT
Start: 2021-08-09 | End: 2021-09-07 | Stop reason: HOSPADM

## 2021-08-09 RX ADMIN — DICLOFENAC SODIUM 4 G: 10 GEL TOPICAL at 03:08

## 2021-08-09 RX ADMIN — HYDROCODONE BITARTRATE AND ACETAMINOPHEN 1 TABLET: 10; 325 TABLET ORAL at 12:08

## 2021-08-09 RX ADMIN — GABAPENTIN 300 MG: 300 CAPSULE ORAL at 03:08

## 2021-08-09 RX ADMIN — METHOCARBAMOL 500 MG: 500 TABLET ORAL at 03:08

## 2021-08-09 RX ADMIN — MELATONIN TAB 3 MG 6 MG: 3 TAB at 08:08

## 2021-08-09 RX ADMIN — PANTOPRAZOLE SODIUM 40 MG: 40 TABLET, DELAYED RELEASE ORAL at 08:08

## 2021-08-09 RX ADMIN — DIPHENHYDRAMINE HYDROCHLORIDE 10 ML: 25 SOLUTION ORAL at 08:08

## 2021-08-09 RX ADMIN — HYDROCODONE BITARTRATE AND ACETAMINOPHEN 1 TABLET: 5; 325 TABLET ORAL at 02:08

## 2021-08-09 RX ADMIN — GABAPENTIN 300 MG: 300 CAPSULE ORAL at 08:08

## 2021-08-09 RX ADMIN — CALCIUM CARBONATE (ANTACID) CHEW TAB 500 MG 1000 MG: 500 CHEW TAB at 08:08

## 2021-08-09 RX ADMIN — ACETAMINOPHEN 1000 MG: 500 TABLET, FILM COATED ORAL at 08:08

## 2021-08-09 RX ADMIN — TIOTROPIUM BROMIDE INHALATION SPRAY 2 PUFF: 3.12 SPRAY, METERED RESPIRATORY (INHALATION) at 08:08

## 2021-08-09 RX ADMIN — METOPROLOL SUCCINATE 50 MG: 50 TABLET, EXTENDED RELEASE ORAL at 08:08

## 2021-08-09 RX ADMIN — METHOCARBAMOL 500 MG: 500 TABLET ORAL at 08:08

## 2021-08-09 RX ADMIN — ACETAMINOPHEN 1000 MG: 500 TABLET, FILM COATED ORAL at 03:08

## 2021-08-09 RX ADMIN — ATORVASTATIN CALCIUM 40 MG: 20 TABLET, FILM COATED ORAL at 08:08

## 2021-08-09 RX ADMIN — Medication 800 MG: at 08:08

## 2021-08-09 RX ADMIN — DICLOFENAC SODIUM 4 G: 10 GEL TOPICAL at 08:08

## 2021-08-09 RX ADMIN — FLUTICASONE FUROATE AND VILANTEROL TRIFENATATE 1 PUFF: 100; 25 POWDER RESPIRATORY (INHALATION) at 08:08

## 2021-08-09 RX ADMIN — Medication 800 MG: at 12:08

## 2021-08-09 RX ADMIN — LIDOCAINE 1 PATCH: 50 PATCH TOPICAL at 03:08

## 2021-08-09 RX ADMIN — GABAPENTIN 300 MG: 300 CAPSULE ORAL at 09:08

## 2021-08-10 LAB
ANION GAP SERPL CALC-SCNC: 9 MMOL/L (ref 8–16)
BUN SERPL-MCNC: 19 MG/DL (ref 8–23)
CALCIUM SERPL-MCNC: 7.3 MG/DL (ref 8.7–10.5)
CHLORIDE SERPL-SCNC: 100 MMOL/L (ref 95–110)
CO2 SERPL-SCNC: 21 MMOL/L (ref 23–29)
CREAT SERPL-MCNC: 0.8 MG/DL (ref 0.5–1.4)
EST. GFR  (AFRICAN AMERICAN): >60 ML/MIN/1.73 M^2
EST. GFR  (NON AFRICAN AMERICAN): >60 ML/MIN/1.73 M^2
GLUCOSE SERPL-MCNC: 95 MG/DL (ref 70–110)
POTASSIUM SERPL-SCNC: 4.5 MMOL/L (ref 3.5–5.1)
SODIUM SERPL-SCNC: 130 MMOL/L (ref 136–145)

## 2021-08-10 PROCEDURE — 97535 SELF CARE MNGMENT TRAINING: CPT | Mod: CO

## 2021-08-10 PROCEDURE — 94761 N-INVAS EAR/PLS OXIMETRY MLT: CPT

## 2021-08-10 PROCEDURE — 25000003 PHARM REV CODE 250: Performed by: PHYSICIAN ASSISTANT

## 2021-08-10 PROCEDURE — 97530 THERAPEUTIC ACTIVITIES: CPT | Mod: CQ

## 2021-08-10 PROCEDURE — 99900035 HC TECH TIME PER 15 MIN (STAT)

## 2021-08-10 PROCEDURE — 25000003 PHARM REV CODE 250: Performed by: NURSE PRACTITIONER

## 2021-08-10 PROCEDURE — 27000221 HC OXYGEN, UP TO 24 HOURS

## 2021-08-10 PROCEDURE — 80048 BASIC METABOLIC PNL TOTAL CA: CPT | Performed by: NURSE PRACTITIONER

## 2021-08-10 PROCEDURE — 97110 THERAPEUTIC EXERCISES: CPT | Mod: CQ

## 2021-08-10 PROCEDURE — 36415 COLL VENOUS BLD VENIPUNCTURE: CPT | Performed by: NURSE PRACTITIONER

## 2021-08-10 PROCEDURE — 11000004 HC SNF PRIVATE

## 2021-08-10 RX ORDER — LOPERAMIDE HYDROCHLORIDE 2 MG/1
2 CAPSULE ORAL 4 TIMES DAILY PRN
Status: DISCONTINUED | OUTPATIENT
Start: 2021-08-10 | End: 2021-09-07 | Stop reason: HOSPADM

## 2021-08-10 RX ORDER — AMOXICILLIN 250 MG
1 CAPSULE ORAL 2 TIMES DAILY PRN
Status: DISCONTINUED | OUTPATIENT
Start: 2021-08-10 | End: 2021-09-07 | Stop reason: HOSPADM

## 2021-08-10 RX ADMIN — FERROUS SULFATE TAB EC 325 MG (65 MG FE EQUIVALENT) 325 MG: 325 (65 FE) TABLET DELAYED RESPONSE at 09:08

## 2021-08-10 RX ADMIN — Medication 800 MG: at 08:08

## 2021-08-10 RX ADMIN — METHOCARBAMOL 500 MG: 500 TABLET ORAL at 09:08

## 2021-08-10 RX ADMIN — METOPROLOL SUCCINATE 50 MG: 50 TABLET, EXTENDED RELEASE ORAL at 09:08

## 2021-08-10 RX ADMIN — METHOCARBAMOL 500 MG: 500 TABLET ORAL at 08:08

## 2021-08-10 RX ADMIN — GABAPENTIN 300 MG: 300 CAPSULE ORAL at 08:08

## 2021-08-10 RX ADMIN — HYDROCODONE BITARTRATE AND ACETAMINOPHEN 1 TABLET: 10; 325 TABLET ORAL at 04:08

## 2021-08-10 RX ADMIN — DIPHENHYDRAMINE HYDROCHLORIDE 10 ML: 25 SOLUTION ORAL at 08:08

## 2021-08-10 RX ADMIN — HYDROCODONE BITARTRATE AND ACETAMINOPHEN 1 TABLET: 10; 325 TABLET ORAL at 01:08

## 2021-08-10 RX ADMIN — ATORVASTATIN CALCIUM 40 MG: 20 TABLET, FILM COATED ORAL at 09:08

## 2021-08-10 RX ADMIN — PANTOPRAZOLE SODIUM 40 MG: 40 TABLET, DELAYED RELEASE ORAL at 09:08

## 2021-08-10 RX ADMIN — LOPERAMIDE HYDROCHLORIDE 2 MG: 2 CAPSULE ORAL at 04:08

## 2021-08-10 RX ADMIN — CALCIUM CARBONATE (ANTACID) CHEW TAB 500 MG 1000 MG: 500 CHEW TAB at 08:08

## 2021-08-10 RX ADMIN — LIDOCAINE 1 PATCH: 50 PATCH TOPICAL at 02:08

## 2021-08-10 RX ADMIN — DICLOFENAC SODIUM 4 G: 10 GEL TOPICAL at 09:08

## 2021-08-10 RX ADMIN — DICLOFENAC SODIUM 4 G: 10 GEL TOPICAL at 02:08

## 2021-08-10 RX ADMIN — FLUTICASONE FUROATE AND VILANTEROL TRIFENATATE 1 PUFF: 100; 25 POWDER RESPIRATORY (INHALATION) at 09:08

## 2021-08-10 RX ADMIN — DIPHENHYDRAMINE HYDROCHLORIDE 10 ML: 25 SOLUTION ORAL at 10:08

## 2021-08-10 RX ADMIN — MELATONIN TAB 3 MG 6 MG: 3 TAB at 08:08

## 2021-08-10 RX ADMIN — TIOTROPIUM BROMIDE INHALATION SPRAY 2 PUFF: 3.12 SPRAY, METERED RESPIRATORY (INHALATION) at 09:08

## 2021-08-10 RX ADMIN — GABAPENTIN 300 MG: 300 CAPSULE ORAL at 02:08

## 2021-08-10 RX ADMIN — DIPHENHYDRAMINE HYDROCHLORIDE 10 ML: 25 SOLUTION ORAL at 04:08

## 2021-08-10 RX ADMIN — Medication 800 MG: at 09:08

## 2021-08-10 RX ADMIN — GABAPENTIN 300 MG: 300 CAPSULE ORAL at 09:08

## 2021-08-10 RX ADMIN — DIPHENHYDRAMINE HYDROCHLORIDE 10 ML: 25 SOLUTION ORAL at 06:08

## 2021-08-10 RX ADMIN — METHOCARBAMOL 500 MG: 500 TABLET ORAL at 02:08

## 2021-08-10 RX ADMIN — DICLOFENAC SODIUM 4 G: 10 GEL TOPICAL at 08:08

## 2021-08-10 RX ADMIN — Medication 250 MG: at 09:08

## 2021-08-10 RX ADMIN — CALCIUM CARBONATE (ANTACID) CHEW TAB 500 MG 1000 MG: 500 CHEW TAB at 09:08

## 2021-08-10 RX ADMIN — HYDROCODONE BITARTRATE AND ACETAMINOPHEN 1 TABLET: 10; 325 TABLET ORAL at 09:08

## 2021-08-11 PROCEDURE — 11000004 HC SNF PRIVATE

## 2021-08-11 PROCEDURE — 99305 1ST NF CARE MODERATE MDM 35: CPT | Mod: ,,, | Performed by: HOSPITALIST

## 2021-08-11 PROCEDURE — 25000003 PHARM REV CODE 250: Performed by: NURSE PRACTITIONER

## 2021-08-11 PROCEDURE — 97110 THERAPEUTIC EXERCISES: CPT | Mod: CO

## 2021-08-11 PROCEDURE — 97110 THERAPEUTIC EXERCISES: CPT | Mod: CQ

## 2021-08-11 PROCEDURE — 27000221 HC OXYGEN, UP TO 24 HOURS

## 2021-08-11 PROCEDURE — 97116 GAIT TRAINING THERAPY: CPT | Mod: CQ

## 2021-08-11 PROCEDURE — 25000003 PHARM REV CODE 250: Performed by: PHYSICIAN ASSISTANT

## 2021-08-11 PROCEDURE — 94761 N-INVAS EAR/PLS OXIMETRY MLT: CPT

## 2021-08-11 PROCEDURE — 99305 PR NURSING FACILITY CARE, INIT, MOD SEVERITY: ICD-10-PCS | Mod: ,,, | Performed by: HOSPITALIST

## 2021-08-11 PROCEDURE — 97530 THERAPEUTIC ACTIVITIES: CPT | Mod: CQ

## 2021-08-11 RX ADMIN — METHOCARBAMOL 500 MG: 500 TABLET ORAL at 05:08

## 2021-08-11 RX ADMIN — DIPHENHYDRAMINE HYDROCHLORIDE 10 ML: 25 SOLUTION ORAL at 10:08

## 2021-08-11 RX ADMIN — FLUTICASONE FUROATE AND VILANTEROL TRIFENATATE 1 PUFF: 100; 25 POWDER RESPIRATORY (INHALATION) at 08:08

## 2021-08-11 RX ADMIN — FERROUS SULFATE TAB EC 325 MG (65 MG FE EQUIVALENT) 325 MG: 325 (65 FE) TABLET DELAYED RESPONSE at 08:08

## 2021-08-11 RX ADMIN — CALCIUM CARBONATE (ANTACID) CHEW TAB 500 MG 1000 MG: 500 CHEW TAB at 09:08

## 2021-08-11 RX ADMIN — METOPROLOL SUCCINATE 50 MG: 50 TABLET, EXTENDED RELEASE ORAL at 08:08

## 2021-08-11 RX ADMIN — HYDROCODONE BITARTRATE AND ACETAMINOPHEN 1 TABLET: 10; 325 TABLET ORAL at 06:08

## 2021-08-11 RX ADMIN — METHOCARBAMOL 500 MG: 500 TABLET ORAL at 09:08

## 2021-08-11 RX ADMIN — ATORVASTATIN CALCIUM 40 MG: 20 TABLET, FILM COATED ORAL at 08:08

## 2021-08-11 RX ADMIN — LOPERAMIDE HYDROCHLORIDE 2 MG: 2 CAPSULE ORAL at 06:08

## 2021-08-11 RX ADMIN — TIOTROPIUM BROMIDE INHALATION SPRAY 2 PUFF: 3.12 SPRAY, METERED RESPIRATORY (INHALATION) at 08:08

## 2021-08-11 RX ADMIN — Medication 800 MG: at 09:08

## 2021-08-11 RX ADMIN — DIPHENHYDRAMINE HYDROCHLORIDE 10 ML: 25 SOLUTION ORAL at 05:08

## 2021-08-11 RX ADMIN — LOPERAMIDE HYDROCHLORIDE 2 MG: 2 CAPSULE ORAL at 10:08

## 2021-08-11 RX ADMIN — Medication 800 MG: at 08:08

## 2021-08-11 RX ADMIN — DICLOFENAC SODIUM 4 G: 10 GEL TOPICAL at 08:08

## 2021-08-11 RX ADMIN — GABAPENTIN 300 MG: 300 CAPSULE ORAL at 05:08

## 2021-08-11 RX ADMIN — HYDROCODONE BITARTRATE AND ACETAMINOPHEN 1 TABLET: 10; 325 TABLET ORAL at 10:08

## 2021-08-11 RX ADMIN — LIDOCAINE 1 PATCH: 50 PATCH TOPICAL at 05:08

## 2021-08-11 RX ADMIN — GABAPENTIN 300 MG: 300 CAPSULE ORAL at 09:08

## 2021-08-11 RX ADMIN — CALCIUM CARBONATE (ANTACID) CHEW TAB 500 MG 1000 MG: 500 CHEW TAB at 08:08

## 2021-08-11 RX ADMIN — GABAPENTIN 300 MG: 300 CAPSULE ORAL at 08:08

## 2021-08-11 RX ADMIN — DIPHENHYDRAMINE HYDROCHLORIDE 10 ML: 25 SOLUTION ORAL at 09:08

## 2021-08-11 RX ADMIN — MELATONIN TAB 3 MG 6 MG: 3 TAB at 09:08

## 2021-08-11 RX ADMIN — Medication 250 MG: at 08:08

## 2021-08-11 RX ADMIN — HYDROCODONE BITARTRATE AND ACETAMINOPHEN 1 TABLET: 10; 325 TABLET ORAL at 04:08

## 2021-08-11 RX ADMIN — DICLOFENAC SODIUM 4 G: 10 GEL TOPICAL at 05:08

## 2021-08-11 RX ADMIN — DICLOFENAC SODIUM 4 G: 10 GEL TOPICAL at 09:08

## 2021-08-11 RX ADMIN — PANTOPRAZOLE SODIUM 40 MG: 40 TABLET, DELAYED RELEASE ORAL at 08:08

## 2021-08-11 RX ADMIN — METHOCARBAMOL 500 MG: 500 TABLET ORAL at 08:08

## 2021-08-12 ENCOUNTER — LAB VISIT (OUTPATIENT)
Dept: LAB | Facility: OTHER | Age: 71
End: 2021-08-12
Payer: MEDICARE

## 2021-08-12 DIAGNOSIS — Z20.822 ENCOUNTER FOR LABORATORY TESTING FOR COVID-19 VIRUS: ICD-10-CM

## 2021-08-12 LAB
ABO + RH BLD: NORMAL
ALBUMIN SERPL BCP-MCNC: 1.8 G/DL (ref 3.5–5.2)
ALP SERPL-CCNC: 96 U/L (ref 55–135)
ALT SERPL W/O P-5'-P-CCNC: 6 U/L (ref 10–44)
ANION GAP SERPL CALC-SCNC: 6 MMOL/L (ref 8–16)
AST SERPL-CCNC: 14 U/L (ref 10–40)
BASOPHILS # BLD AUTO: 0.04 K/UL (ref 0–0.2)
BASOPHILS # BLD AUTO: 0.08 K/UL (ref 0–0.2)
BASOPHILS NFR BLD: 0.8 % (ref 0–1.9)
BASOPHILS NFR BLD: 0.9 % (ref 0–1.9)
BILIRUB SERPL-MCNC: 0.2 MG/DL (ref 0.1–1)
BLD GP AB SCN CELLS X3 SERPL QL: NORMAL
BUN SERPL-MCNC: 20 MG/DL (ref 8–23)
CALCIUM SERPL-MCNC: 7.3 MG/DL (ref 8.7–10.5)
CHLORIDE SERPL-SCNC: 102 MMOL/L (ref 95–110)
CO2 SERPL-SCNC: 22 MMOL/L (ref 23–29)
CREAT SERPL-MCNC: 0.8 MG/DL (ref 0.5–1.4)
DIFFERENTIAL METHOD: ABNORMAL
DIFFERENTIAL METHOD: ABNORMAL
EOSINOPHIL # BLD AUTO: 0.2 K/UL (ref 0–0.5)
EOSINOPHIL # BLD AUTO: 0.3 K/UL (ref 0–0.5)
EOSINOPHIL NFR BLD: 3.3 % (ref 0–8)
EOSINOPHIL NFR BLD: 3.7 % (ref 0–8)
ERYTHROCYTE [DISTWIDTH] IN BLOOD BY AUTOMATED COUNT: 16.8 % (ref 11.5–14.5)
ERYTHROCYTE [DISTWIDTH] IN BLOOD BY AUTOMATED COUNT: 17.1 % (ref 11.5–14.5)
EST. GFR  (AFRICAN AMERICAN): >60 ML/MIN/1.73 M^2
EST. GFR  (NON AFRICAN AMERICAN): >60 ML/MIN/1.73 M^2
GLUCOSE SERPL-MCNC: 89 MG/DL (ref 70–110)
HCT VFR BLD AUTO: 20.1 % (ref 37–48.5)
HCT VFR BLD AUTO: 25.4 % (ref 37–48.5)
HGB BLD-MCNC: 6.6 G/DL (ref 12–16)
HGB BLD-MCNC: 8.2 G/DL (ref 12–16)
IMM GRANULOCYTES # BLD AUTO: 0.02 K/UL (ref 0–0.04)
IMM GRANULOCYTES # BLD AUTO: 0.03 K/UL (ref 0–0.04)
IMM GRANULOCYTES NFR BLD AUTO: 0.4 % (ref 0–0.5)
IMM GRANULOCYTES NFR BLD AUTO: 0.4 % (ref 0–0.5)
LYMPHOCYTES # BLD AUTO: 1.5 K/UL (ref 1–4.8)
LYMPHOCYTES # BLD AUTO: 2.6 K/UL (ref 1–4.8)
LYMPHOCYTES NFR BLD: 30.4 % (ref 18–48)
LYMPHOCYTES NFR BLD: 31.1 % (ref 18–48)
MAGNESIUM SERPL-MCNC: 1 MG/DL (ref 1.6–2.6)
MCH RBC QN AUTO: 30 PG (ref 27–31)
MCH RBC QN AUTO: 30.3 PG (ref 27–31)
MCHC RBC AUTO-ENTMCNC: 32.3 G/DL (ref 32–36)
MCHC RBC AUTO-ENTMCNC: 32.8 G/DL (ref 32–36)
MCV RBC AUTO: 91 FL (ref 82–98)
MCV RBC AUTO: 94 FL (ref 82–98)
MONOCYTES # BLD AUTO: 0.8 K/UL (ref 0.3–1)
MONOCYTES # BLD AUTO: 1.2 K/UL (ref 0.3–1)
MONOCYTES NFR BLD: 13.5 % (ref 4–15)
MONOCYTES NFR BLD: 15.5 % (ref 4–15)
NEUTROPHILS # BLD AUTO: 2.4 K/UL (ref 1.8–7.7)
NEUTROPHILS # BLD AUTO: 4.4 K/UL (ref 1.8–7.7)
NEUTROPHILS NFR BLD: 48.5 % (ref 38–73)
NEUTROPHILS NFR BLD: 51.5 % (ref 38–73)
NRBC BLD-RTO: 0 /100 WBC
NRBC BLD-RTO: 0 /100 WBC
PHOSPHATE SERPL-MCNC: 4.2 MG/DL (ref 2.7–4.5)
PLATELET # BLD AUTO: 332 K/UL (ref 150–450)
PLATELET # BLD AUTO: 440 K/UL (ref 150–450)
PMV BLD AUTO: 10 FL (ref 9.2–12.9)
PMV BLD AUTO: 9.8 FL (ref 9.2–12.9)
POTASSIUM SERPL-SCNC: 4.4 MMOL/L (ref 3.5–5.1)
PROT SERPL-MCNC: 5 G/DL (ref 6–8.4)
RBC # BLD AUTO: 2.2 M/UL (ref 4–5.4)
RBC # BLD AUTO: 2.71 M/UL (ref 4–5.4)
SODIUM SERPL-SCNC: 130 MMOL/L (ref 136–145)
WBC # BLD AUTO: 4.89 K/UL (ref 3.9–12.7)
WBC # BLD AUTO: 8.49 K/UL (ref 3.9–12.7)

## 2021-08-12 PROCEDURE — 25000003 PHARM REV CODE 250: Performed by: NURSE PRACTITIONER

## 2021-08-12 PROCEDURE — 87077 CULTURE AEROBIC IDENTIFY: CPT | Performed by: NURSE PRACTITIONER

## 2021-08-12 PROCEDURE — 97110 THERAPEUTIC EXERCISES: CPT

## 2021-08-12 PROCEDURE — 80053 COMPREHEN METABOLIC PANEL: CPT | Performed by: NURSE PRACTITIONER

## 2021-08-12 PROCEDURE — 83735 ASSAY OF MAGNESIUM: CPT | Performed by: PHYSICIAN ASSISTANT

## 2021-08-12 PROCEDURE — 85025 COMPLETE CBC W/AUTO DIFF WBC: CPT | Performed by: NURSE PRACTITIONER

## 2021-08-12 PROCEDURE — 86900 BLOOD TYPING SEROLOGIC ABO: CPT | Performed by: NURSE PRACTITIONER

## 2021-08-12 PROCEDURE — 36415 COLL VENOUS BLD VENIPUNCTURE: CPT | Performed by: PHYSICIAN ASSISTANT

## 2021-08-12 PROCEDURE — 87086 URINE CULTURE/COLONY COUNT: CPT | Performed by: NURSE PRACTITIONER

## 2021-08-12 PROCEDURE — 81001 URINALYSIS AUTO W/SCOPE: CPT | Performed by: NURSE PRACTITIONER

## 2021-08-12 PROCEDURE — 97116 GAIT TRAINING THERAPY: CPT

## 2021-08-12 PROCEDURE — U0003 INFECTIOUS AGENT DETECTION BY NUCLEIC ACID (DNA OR RNA); SEVERE ACUTE RESPIRATORY SYNDROME CORONAVIRUS 2 (SARS-COV-2) (CORONAVIRUS DISEASE [COVID-19]), AMPLIFIED PROBE TECHNIQUE, MAKING USE OF HIGH THROUGHPUT TECHNOLOGIES AS DESCRIBED BY CMS-2020-01-R: HCPCS | Performed by: NURSE PRACTITIONER

## 2021-08-12 PROCEDURE — 27000221 HC OXYGEN, UP TO 24 HOURS

## 2021-08-12 PROCEDURE — 84100 ASSAY OF PHOSPHORUS: CPT | Performed by: PHYSICIAN ASSISTANT

## 2021-08-12 PROCEDURE — 11000004 HC SNF PRIVATE

## 2021-08-12 PROCEDURE — 94761 N-INVAS EAR/PLS OXIMETRY MLT: CPT

## 2021-08-12 PROCEDURE — 99900035 HC TECH TIME PER 15 MIN (STAT)

## 2021-08-12 PROCEDURE — 85025 COMPLETE CBC W/AUTO DIFF WBC: CPT | Mod: 91 | Performed by: PHYSICIAN ASSISTANT

## 2021-08-12 PROCEDURE — 36415 COLL VENOUS BLD VENIPUNCTURE: CPT | Performed by: NURSE PRACTITIONER

## 2021-08-12 PROCEDURE — 87088 URINE BACTERIA CULTURE: CPT | Performed by: NURSE PRACTITIONER

## 2021-08-12 PROCEDURE — 25000003 PHARM REV CODE 250: Performed by: PHYSICIAN ASSISTANT

## 2021-08-12 PROCEDURE — 97535 SELF CARE MNGMENT TRAINING: CPT

## 2021-08-12 PROCEDURE — 87186 SC STD MICRODIL/AGAR DIL: CPT | Performed by: NURSE PRACTITIONER

## 2021-08-12 RX ORDER — SODIUM BICARBONATE 650 MG/1
650 TABLET ORAL ONCE
Status: COMPLETED | OUTPATIENT
Start: 2021-08-12 | End: 2021-08-12

## 2021-08-12 RX ORDER — CALCIUM CARBONATE 200(500)MG
1500 TABLET,CHEWABLE ORAL 2 TIMES DAILY
Status: DISCONTINUED | OUTPATIENT
Start: 2021-08-12 | End: 2021-09-07 | Stop reason: HOSPADM

## 2021-08-12 RX ADMIN — DIPHENHYDRAMINE HYDROCHLORIDE 10 ML: 25 SOLUTION ORAL at 11:08

## 2021-08-12 RX ADMIN — DICLOFENAC SODIUM 4 G: 10 GEL TOPICAL at 09:08

## 2021-08-12 RX ADMIN — DICLOFENAC SODIUM 4 G: 10 GEL TOPICAL at 02:08

## 2021-08-12 RX ADMIN — ATORVASTATIN CALCIUM 40 MG: 20 TABLET, FILM COATED ORAL at 09:08

## 2021-08-12 RX ADMIN — Medication 250 MG: at 09:08

## 2021-08-12 RX ADMIN — METHOCARBAMOL 500 MG: 500 TABLET ORAL at 09:08

## 2021-08-12 RX ADMIN — DICLOFENAC SODIUM 4 G: 10 GEL TOPICAL at 10:08

## 2021-08-12 RX ADMIN — LIDOCAINE 1 PATCH: 50 PATCH TOPICAL at 01:08

## 2021-08-12 RX ADMIN — METHOCARBAMOL 500 MG: 500 TABLET ORAL at 02:08

## 2021-08-12 RX ADMIN — LOPERAMIDE HYDROCHLORIDE 2 MG: 2 CAPSULE ORAL at 09:08

## 2021-08-12 RX ADMIN — METHOCARBAMOL 500 MG: 500 TABLET ORAL at 10:08

## 2021-08-12 RX ADMIN — CALCIUM CARBONATE (ANTACID) CHEW TAB 500 MG 1500 MG: 500 CHEW TAB at 10:08

## 2021-08-12 RX ADMIN — PANTOPRAZOLE SODIUM 40 MG: 40 TABLET, DELAYED RELEASE ORAL at 09:08

## 2021-08-12 RX ADMIN — HYDROCODONE BITARTRATE AND ACETAMINOPHEN 1 TABLET: 10; 325 TABLET ORAL at 01:08

## 2021-08-12 RX ADMIN — GABAPENTIN 300 MG: 300 CAPSULE ORAL at 10:08

## 2021-08-12 RX ADMIN — MELATONIN TAB 3 MG 6 MG: 3 TAB at 10:08

## 2021-08-12 RX ADMIN — HYDROCODONE BITARTRATE AND ACETAMINOPHEN 1 TABLET: 10; 325 TABLET ORAL at 10:08

## 2021-08-12 RX ADMIN — HYDROCODONE BITARTRATE AND ACETAMINOPHEN 1 TABLET: 10; 325 TABLET ORAL at 09:08

## 2021-08-12 RX ADMIN — Medication 1 CAPSULE: at 03:08

## 2021-08-12 RX ADMIN — SODIUM BICARBONATE 650 MG TABLET 650 MG: at 11:08

## 2021-08-12 RX ADMIN — Medication 800 MG: at 09:08

## 2021-08-12 RX ADMIN — CALCIUM CARBONATE (ANTACID) CHEW TAB 500 MG 1000 MG: 500 CHEW TAB at 09:08

## 2021-08-12 RX ADMIN — TIOTROPIUM BROMIDE INHALATION SPRAY 2 PUFF: 3.12 SPRAY, METERED RESPIRATORY (INHALATION) at 09:08

## 2021-08-12 RX ADMIN — Medication 800 MG: at 10:08

## 2021-08-12 RX ADMIN — FLUTICASONE FUROATE AND VILANTEROL TRIFENATATE 1 PUFF: 100; 25 POWDER RESPIRATORY (INHALATION) at 09:08

## 2021-08-12 RX ADMIN — LOPERAMIDE HYDROCHLORIDE 2 MG: 2 CAPSULE ORAL at 10:08

## 2021-08-12 RX ADMIN — GABAPENTIN 300 MG: 300 CAPSULE ORAL at 02:08

## 2021-08-12 RX ADMIN — DIPHENHYDRAMINE HYDROCHLORIDE 10 ML: 25 SOLUTION ORAL at 05:08

## 2021-08-12 RX ADMIN — FERROUS SULFATE TAB EC 325 MG (65 MG FE EQUIVALENT) 325 MG: 325 (65 FE) TABLET DELAYED RESPONSE at 09:08

## 2021-08-12 RX ADMIN — GABAPENTIN 300 MG: 300 CAPSULE ORAL at 09:08

## 2021-08-13 LAB
BACTERIA #/AREA URNS AUTO: ABNORMAL /HPF
BILIRUB UR QL STRIP: NEGATIVE
CLARITY UR REFRACT.AUTO: ABNORMAL
COLOR UR AUTO: YELLOW
GLUCOSE UR QL STRIP: NEGATIVE
HGB UR QL STRIP: ABNORMAL
KETONES UR QL STRIP: NEGATIVE
LEUKOCYTE ESTERASE UR QL STRIP: ABNORMAL
MICROSCOPIC COMMENT: ABNORMAL
NITRITE UR QL STRIP: NEGATIVE
PH UR STRIP: 6 [PH] (ref 5–8)
PROT UR QL STRIP: NEGATIVE
RBC #/AREA URNS AUTO: 4 /HPF (ref 0–4)
SARS-COV-2 RNA RESP QL NAA+PROBE: NOT DETECTED
SARS-COV-2- CYCLE NUMBER: -1
SP GR UR STRIP: 1.01 (ref 1–1.03)
SQUAMOUS #/AREA URNS AUTO: 5 /HPF
URN SPEC COLLECT METH UR: ABNORMAL
WBC #/AREA URNS AUTO: >100 /HPF (ref 0–5)
WBC CLUMPS UR QL AUTO: ABNORMAL

## 2021-08-13 PROCEDURE — 97110 THERAPEUTIC EXERCISES: CPT | Mod: CQ

## 2021-08-13 PROCEDURE — 25000242 PHARM REV CODE 250 ALT 637 W/ HCPCS: Performed by: PHYSICIAN ASSISTANT

## 2021-08-13 PROCEDURE — 25000003 PHARM REV CODE 250: Performed by: PHYSICIAN ASSISTANT

## 2021-08-13 PROCEDURE — 97535 SELF CARE MNGMENT TRAINING: CPT | Mod: CO

## 2021-08-13 PROCEDURE — 97110 THERAPEUTIC EXERCISES: CPT | Mod: CO

## 2021-08-13 PROCEDURE — 27000221 HC OXYGEN, UP TO 24 HOURS

## 2021-08-13 PROCEDURE — 99900035 HC TECH TIME PER 15 MIN (STAT)

## 2021-08-13 PROCEDURE — 25000242 PHARM REV CODE 250 ALT 637 W/ HCPCS: Performed by: NURSE PRACTITIONER

## 2021-08-13 PROCEDURE — 97116 GAIT TRAINING THERAPY: CPT | Mod: CQ

## 2021-08-13 PROCEDURE — 25000003 PHARM REV CODE 250: Performed by: NURSE PRACTITIONER

## 2021-08-13 PROCEDURE — 94761 N-INVAS EAR/PLS OXIMETRY MLT: CPT

## 2021-08-13 PROCEDURE — 11000004 HC SNF PRIVATE

## 2021-08-13 RX ORDER — CEFPODOXIME PROXETIL 100 MG/1
100 TABLET, FILM COATED ORAL EVERY 12 HOURS
Status: COMPLETED | OUTPATIENT
Start: 2021-08-13 | End: 2021-08-20

## 2021-08-13 RX ADMIN — METHOCARBAMOL 500 MG: 500 TABLET ORAL at 02:08

## 2021-08-13 RX ADMIN — MELATONIN TAB 3 MG 6 MG: 3 TAB at 10:08

## 2021-08-13 RX ADMIN — Medication 1 CAPSULE: at 09:08

## 2021-08-13 RX ADMIN — CEFPODOXIME PROXETIL 100 MG: 100 TABLET, FILM COATED ORAL at 10:08

## 2021-08-13 RX ADMIN — ATORVASTATIN CALCIUM 40 MG: 20 TABLET, FILM COATED ORAL at 09:08

## 2021-08-13 RX ADMIN — HYDROCODONE BITARTRATE AND ACETAMINOPHEN 1 TABLET: 10; 325 TABLET ORAL at 10:08

## 2021-08-13 RX ADMIN — METHOCARBAMOL 500 MG: 500 TABLET ORAL at 10:08

## 2021-08-13 RX ADMIN — METHOCARBAMOL 500 MG: 500 TABLET ORAL at 09:08

## 2021-08-13 RX ADMIN — PANTOPRAZOLE SODIUM 40 MG: 40 TABLET, DELAYED RELEASE ORAL at 09:08

## 2021-08-13 RX ADMIN — FLUTICASONE FUROATE AND VILANTEROL TRIFENATATE 1 PUFF: 100; 25 POWDER RESPIRATORY (INHALATION) at 09:08

## 2021-08-13 RX ADMIN — GABAPENTIN 300 MG: 300 CAPSULE ORAL at 09:08

## 2021-08-13 RX ADMIN — CALCIUM CARBONATE (ANTACID) CHEW TAB 500 MG 1500 MG: 500 CHEW TAB at 10:08

## 2021-08-13 RX ADMIN — DICLOFENAC SODIUM 4 G: 10 GEL TOPICAL at 02:08

## 2021-08-13 RX ADMIN — HYDROCODONE BITARTRATE AND ACETAMINOPHEN 1 TABLET: 10; 325 TABLET ORAL at 09:08

## 2021-08-13 RX ADMIN — CALCIUM CARBONATE (ANTACID) CHEW TAB 500 MG 1500 MG: 500 CHEW TAB at 09:08

## 2021-08-13 RX ADMIN — LIDOCAINE 1 PATCH: 50 PATCH TOPICAL at 02:08

## 2021-08-13 RX ADMIN — FERROUS SULFATE TAB EC 325 MG (65 MG FE EQUIVALENT) 325 MG: 325 (65 FE) TABLET DELAYED RESPONSE at 09:08

## 2021-08-13 RX ADMIN — Medication 800 MG: at 10:08

## 2021-08-13 RX ADMIN — DICLOFENAC SODIUM 4 G: 10 GEL TOPICAL at 10:08

## 2021-08-13 RX ADMIN — HYDROCODONE BITARTRATE AND ACETAMINOPHEN 1 TABLET: 10; 325 TABLET ORAL at 04:08

## 2021-08-13 RX ADMIN — METOPROLOL SUCCINATE 50 MG: 50 TABLET, EXTENDED RELEASE ORAL at 09:08

## 2021-08-13 RX ADMIN — GABAPENTIN 300 MG: 300 CAPSULE ORAL at 02:08

## 2021-08-13 RX ADMIN — GABAPENTIN 300 MG: 300 CAPSULE ORAL at 10:08

## 2021-08-13 RX ADMIN — TIOTROPIUM BROMIDE INHALATION SPRAY 2 PUFF: 3.12 SPRAY, METERED RESPIRATORY (INHALATION) at 09:08

## 2021-08-13 RX ADMIN — Medication 800 MG: at 09:08

## 2021-08-13 RX ADMIN — DICLOFENAC SODIUM 4 G: 10 GEL TOPICAL at 09:08

## 2021-08-13 RX ADMIN — Medication 250 MG: at 09:08

## 2021-08-14 PROCEDURE — 11000004 HC SNF PRIVATE

## 2021-08-14 PROCEDURE — 27000221 HC OXYGEN, UP TO 24 HOURS

## 2021-08-14 PROCEDURE — 94761 N-INVAS EAR/PLS OXIMETRY MLT: CPT

## 2021-08-14 PROCEDURE — 97110 THERAPEUTIC EXERCISES: CPT | Mod: CQ

## 2021-08-14 PROCEDURE — 25000003 PHARM REV CODE 250: Performed by: PHYSICIAN ASSISTANT

## 2021-08-14 PROCEDURE — 99900035 HC TECH TIME PER 15 MIN (STAT)

## 2021-08-14 PROCEDURE — 25000003 PHARM REV CODE 250: Performed by: NURSE PRACTITIONER

## 2021-08-14 PROCEDURE — 25000242 PHARM REV CODE 250 ALT 637 W/ HCPCS: Performed by: NURSE PRACTITIONER

## 2021-08-14 RX ADMIN — Medication 250 MG: at 10:08

## 2021-08-14 RX ADMIN — METOPROLOL SUCCINATE 50 MG: 50 TABLET, EXTENDED RELEASE ORAL at 10:08

## 2021-08-14 RX ADMIN — CEFPODOXIME PROXETIL 100 MG: 100 TABLET, FILM COATED ORAL at 10:08

## 2021-08-14 RX ADMIN — Medication 800 MG: at 10:08

## 2021-08-14 RX ADMIN — HYDROCODONE BITARTRATE AND ACETAMINOPHEN 1 TABLET: 10; 325 TABLET ORAL at 10:08

## 2021-08-14 RX ADMIN — Medication 800 MG: at 09:08

## 2021-08-14 RX ADMIN — FLUTICASONE FUROATE AND VILANTEROL TRIFENATATE 1 PUFF: 100; 25 POWDER RESPIRATORY (INHALATION) at 10:08

## 2021-08-14 RX ADMIN — Medication 1 CAPSULE: at 10:08

## 2021-08-14 RX ADMIN — CALCIUM CARBONATE (ANTACID) CHEW TAB 500 MG 1500 MG: 500 CHEW TAB at 09:08

## 2021-08-14 RX ADMIN — LIDOCAINE 1 PATCH: 50 PATCH TOPICAL at 02:08

## 2021-08-14 RX ADMIN — METHOCARBAMOL 500 MG: 500 TABLET ORAL at 02:08

## 2021-08-14 RX ADMIN — GABAPENTIN 300 MG: 300 CAPSULE ORAL at 09:08

## 2021-08-14 RX ADMIN — DICLOFENAC SODIUM 4 G: 10 GEL TOPICAL at 09:08

## 2021-08-14 RX ADMIN — METHOCARBAMOL 500 MG: 500 TABLET ORAL at 09:08

## 2021-08-14 RX ADMIN — FERROUS SULFATE TAB EC 325 MG (65 MG FE EQUIVALENT) 325 MG: 325 (65 FE) TABLET DELAYED RESPONSE at 10:08

## 2021-08-14 RX ADMIN — CALCIUM CARBONATE (ANTACID) CHEW TAB 500 MG 1500 MG: 500 CHEW TAB at 10:08

## 2021-08-14 RX ADMIN — LOPERAMIDE HYDROCHLORIDE 2 MG: 2 CAPSULE ORAL at 09:08

## 2021-08-14 RX ADMIN — HYDROCODONE BITARTRATE AND ACETAMINOPHEN 1 TABLET: 10; 325 TABLET ORAL at 09:08

## 2021-08-14 RX ADMIN — TIOTROPIUM BROMIDE INHALATION SPRAY 2 PUFF: 3.12 SPRAY, METERED RESPIRATORY (INHALATION) at 10:08

## 2021-08-14 RX ADMIN — GABAPENTIN 300 MG: 300 CAPSULE ORAL at 02:08

## 2021-08-14 RX ADMIN — DICLOFENAC SODIUM 4 G: 10 GEL TOPICAL at 10:08

## 2021-08-14 RX ADMIN — METHOCARBAMOL 500 MG: 500 TABLET ORAL at 10:08

## 2021-08-14 RX ADMIN — DICLOFENAC SODIUM 4 G: 10 GEL TOPICAL at 02:08

## 2021-08-14 RX ADMIN — ATORVASTATIN CALCIUM 40 MG: 20 TABLET, FILM COATED ORAL at 10:08

## 2021-08-14 RX ADMIN — GABAPENTIN 300 MG: 300 CAPSULE ORAL at 10:08

## 2021-08-14 RX ADMIN — LOPERAMIDE HYDROCHLORIDE 2 MG: 2 CAPSULE ORAL at 10:08

## 2021-08-14 RX ADMIN — MELATONIN TAB 3 MG 6 MG: 3 TAB at 09:08

## 2021-08-15 ENCOUNTER — DOCUMENT SCAN (OUTPATIENT)
Dept: HOME HEALTH SERVICES | Facility: HOSPITAL | Age: 71
End: 2021-08-15
Payer: MEDICARE

## 2021-08-15 PROCEDURE — 27000221 HC OXYGEN, UP TO 24 HOURS

## 2021-08-15 PROCEDURE — 99900035 HC TECH TIME PER 15 MIN (STAT)

## 2021-08-15 PROCEDURE — 11000004 HC SNF PRIVATE

## 2021-08-15 PROCEDURE — 25000003 PHARM REV CODE 250: Performed by: NURSE PRACTITIONER

## 2021-08-15 PROCEDURE — 25000003 PHARM REV CODE 250: Performed by: PHYSICIAN ASSISTANT

## 2021-08-15 PROCEDURE — 97535 SELF CARE MNGMENT TRAINING: CPT

## 2021-08-15 PROCEDURE — 94761 N-INVAS EAR/PLS OXIMETRY MLT: CPT

## 2021-08-15 PROCEDURE — 25000242 PHARM REV CODE 250 ALT 637 W/ HCPCS: Performed by: NURSE PRACTITIONER

## 2021-08-15 RX ADMIN — TIOTROPIUM BROMIDE INHALATION SPRAY 2 PUFF: 3.12 SPRAY, METERED RESPIRATORY (INHALATION) at 09:08

## 2021-08-15 RX ADMIN — ATORVASTATIN CALCIUM 40 MG: 20 TABLET, FILM COATED ORAL at 09:08

## 2021-08-15 RX ADMIN — HYDROCODONE BITARTRATE AND ACETAMINOPHEN 1 TABLET: 10; 325 TABLET ORAL at 03:08

## 2021-08-15 RX ADMIN — CALCIUM CARBONATE (ANTACID) CHEW TAB 500 MG 1500 MG: 500 CHEW TAB at 09:08

## 2021-08-15 RX ADMIN — DICLOFENAC SODIUM 4 G: 10 GEL TOPICAL at 03:08

## 2021-08-15 RX ADMIN — CEFPODOXIME PROXETIL 100 MG: 100 TABLET, FILM COATED ORAL at 08:08

## 2021-08-15 RX ADMIN — CALCIUM CARBONATE (ANTACID) CHEW TAB 500 MG 1500 MG: 500 CHEW TAB at 08:08

## 2021-08-15 RX ADMIN — FLUTICASONE FUROATE AND VILANTEROL TRIFENATATE 1 PUFF: 100; 25 POWDER RESPIRATORY (INHALATION) at 09:08

## 2021-08-15 RX ADMIN — CEFPODOXIME PROXETIL 100 MG: 100 TABLET, FILM COATED ORAL at 09:08

## 2021-08-15 RX ADMIN — GABAPENTIN 300 MG: 300 CAPSULE ORAL at 09:08

## 2021-08-15 RX ADMIN — LIDOCAINE 1 PATCH: 50 PATCH TOPICAL at 03:08

## 2021-08-15 RX ADMIN — HYDROCODONE BITARTRATE AND ACETAMINOPHEN 1 TABLET: 10; 325 TABLET ORAL at 09:08

## 2021-08-15 RX ADMIN — METOPROLOL SUCCINATE 50 MG: 50 TABLET, EXTENDED RELEASE ORAL at 09:08

## 2021-08-15 RX ADMIN — Medication 250 MG: at 09:08

## 2021-08-15 RX ADMIN — HYDROCODONE BITARTRATE AND ACETAMINOPHEN 1 TABLET: 10; 325 TABLET ORAL at 05:08

## 2021-08-15 RX ADMIN — Medication 800 MG: at 09:08

## 2021-08-15 RX ADMIN — GABAPENTIN 300 MG: 300 CAPSULE ORAL at 08:08

## 2021-08-15 RX ADMIN — FERROUS SULFATE TAB EC 325 MG (65 MG FE EQUIVALENT) 325 MG: 325 (65 FE) TABLET DELAYED RESPONSE at 09:08

## 2021-08-15 RX ADMIN — METHOCARBAMOL 500 MG: 500 TABLET ORAL at 09:08

## 2021-08-15 RX ADMIN — DICLOFENAC SODIUM 4 G: 10 GEL TOPICAL at 09:08

## 2021-08-15 RX ADMIN — Medication 800 MG: at 08:08

## 2021-08-15 RX ADMIN — Medication 1 CAPSULE: at 09:08

## 2021-08-15 RX ADMIN — MELATONIN TAB 3 MG 6 MG: 3 TAB at 08:08

## 2021-08-15 RX ADMIN — GABAPENTIN 300 MG: 300 CAPSULE ORAL at 03:08

## 2021-08-15 RX ADMIN — METHOCARBAMOL 500 MG: 500 TABLET ORAL at 08:08

## 2021-08-15 RX ADMIN — DICLOFENAC SODIUM 4 G: 10 GEL TOPICAL at 08:08

## 2021-08-15 RX ADMIN — METHOCARBAMOL 500 MG: 500 TABLET ORAL at 03:08

## 2021-08-16 ENCOUNTER — LAB VISIT (OUTPATIENT)
Dept: LAB | Facility: OTHER | Age: 71
End: 2021-08-16
Payer: MEDICARE

## 2021-08-16 ENCOUNTER — PATIENT MESSAGE (OUTPATIENT)
Dept: INTERNAL MEDICINE | Facility: CLINIC | Age: 71
End: 2021-08-16

## 2021-08-16 DIAGNOSIS — Z20.822 ENCOUNTER FOR LABORATORY TESTING FOR COVID-19 VIRUS: ICD-10-CM

## 2021-08-16 DIAGNOSIS — G89.4 CHRONIC PAIN DISORDER: ICD-10-CM

## 2021-08-16 LAB
ABO + RH BLD: NORMAL
ALBUMIN SERPL BCP-MCNC: 1.9 G/DL (ref 3.5–5.2)
ALP SERPL-CCNC: 93 U/L (ref 55–135)
ALT SERPL W/O P-5'-P-CCNC: 7 U/L (ref 10–44)
ANION GAP SERPL CALC-SCNC: 9 MMOL/L (ref 8–16)
AST SERPL-CCNC: 13 U/L (ref 10–40)
BACTERIA UR CULT: ABNORMAL
BASOPHILS # BLD AUTO: 0.03 K/UL (ref 0–0.2)
BASOPHILS # BLD AUTO: 0.03 K/UL (ref 0–0.2)
BASOPHILS NFR BLD: 0.5 % (ref 0–1.9)
BASOPHILS NFR BLD: 0.6 % (ref 0–1.9)
BILIRUB SERPL-MCNC: 0.2 MG/DL (ref 0.1–1)
BLD GP AB SCN CELLS X3 SERPL QL: NORMAL
BLD PROD TYP BPU: NORMAL
BLOOD UNIT EXPIRATION DATE: NORMAL
BLOOD UNIT TYPE CODE: 5100
BLOOD UNIT TYPE: NORMAL
BUN SERPL-MCNC: 15 MG/DL (ref 8–23)
CALCIUM SERPL-MCNC: 9 MG/DL (ref 8.7–10.5)
CHLORIDE SERPL-SCNC: 101 MMOL/L (ref 95–110)
CO2 SERPL-SCNC: 20 MMOL/L (ref 23–29)
CODING SYSTEM: NORMAL
CREAT SERPL-MCNC: 0.7 MG/DL (ref 0.5–1.4)
DIFFERENTIAL METHOD: ABNORMAL
DIFFERENTIAL METHOD: ABNORMAL
DISPENSE STATUS: NORMAL
EOSINOPHIL # BLD AUTO: 0.3 K/UL (ref 0–0.5)
EOSINOPHIL # BLD AUTO: 0.3 K/UL (ref 0–0.5)
EOSINOPHIL NFR BLD: 4.9 % (ref 0–8)
EOSINOPHIL NFR BLD: 5.5 % (ref 0–8)
ERYTHROCYTE [DISTWIDTH] IN BLOOD BY AUTOMATED COUNT: 16.3 % (ref 11.5–14.5)
ERYTHROCYTE [DISTWIDTH] IN BLOOD BY AUTOMATED COUNT: 16.4 % (ref 11.5–14.5)
EST. GFR  (AFRICAN AMERICAN): >60 ML/MIN/1.73 M^2
EST. GFR  (NON AFRICAN AMERICAN): >60 ML/MIN/1.73 M^2
GLUCOSE SERPL-MCNC: 86 MG/DL (ref 70–110)
HCT VFR BLD AUTO: 21 % (ref 37–48.5)
HCT VFR BLD AUTO: 21.6 % (ref 37–48.5)
HGB BLD-MCNC: 6.7 G/DL (ref 12–16)
HGB BLD-MCNC: 6.9 G/DL (ref 12–16)
IMM GRANULOCYTES # BLD AUTO: 0.01 K/UL (ref 0–0.04)
IMM GRANULOCYTES # BLD AUTO: 0.02 K/UL (ref 0–0.04)
IMM GRANULOCYTES NFR BLD AUTO: 0.2 % (ref 0–0.5)
IMM GRANULOCYTES NFR BLD AUTO: 0.3 % (ref 0–0.5)
LYMPHOCYTES # BLD AUTO: 1.1 K/UL (ref 1–4.8)
LYMPHOCYTES # BLD AUTO: 1.2 K/UL (ref 1–4.8)
LYMPHOCYTES NFR BLD: 21 % (ref 18–48)
LYMPHOCYTES NFR BLD: 21.1 % (ref 18–48)
MAGNESIUM SERPL-MCNC: 1.4 MG/DL (ref 1.6–2.6)
MCH RBC QN AUTO: 29.9 PG (ref 27–31)
MCH RBC QN AUTO: 30 PG (ref 27–31)
MCHC RBC AUTO-ENTMCNC: 31.9 G/DL (ref 32–36)
MCHC RBC AUTO-ENTMCNC: 31.9 G/DL (ref 32–36)
MCV RBC AUTO: 94 FL (ref 82–98)
MCV RBC AUTO: 94 FL (ref 82–98)
MONOCYTES # BLD AUTO: 0.8 K/UL (ref 0.3–1)
MONOCYTES # BLD AUTO: 0.9 K/UL (ref 0.3–1)
MONOCYTES NFR BLD: 14.5 % (ref 4–15)
MONOCYTES NFR BLD: 14.8 % (ref 4–15)
NEUTROPHILS # BLD AUTO: 3.1 K/UL (ref 1.8–7.7)
NEUTROPHILS # BLD AUTO: 3.4 K/UL (ref 1.8–7.7)
NEUTROPHILS NFR BLD: 58.2 % (ref 38–73)
NEUTROPHILS NFR BLD: 58.4 % (ref 38–73)
NRBC BLD-RTO: 0 /100 WBC
NRBC BLD-RTO: 0 /100 WBC
NUM UNITS TRANS PACKED RBC: NORMAL
PHOSPHATE SERPL-MCNC: 4.3 MG/DL (ref 2.7–4.5)
PLATELET # BLD AUTO: 302 K/UL (ref 150–450)
PLATELET # BLD AUTO: 310 K/UL (ref 150–450)
PMV BLD AUTO: 10 FL (ref 9.2–12.9)
PMV BLD AUTO: 10.1 FL (ref 9.2–12.9)
POTASSIUM SERPL-SCNC: 5.3 MMOL/L (ref 3.5–5.1)
PROT SERPL-MCNC: 5.3 G/DL (ref 6–8.4)
RBC # BLD AUTO: 2.24 M/UL (ref 4–5.4)
RBC # BLD AUTO: 2.3 M/UL (ref 4–5.4)
SODIUM SERPL-SCNC: 130 MMOL/L (ref 136–145)
WBC # BLD AUTO: 5.26 K/UL (ref 3.9–12.7)
WBC # BLD AUTO: 5.87 K/UL (ref 3.9–12.7)

## 2021-08-16 PROCEDURE — 27000221 HC OXYGEN, UP TO 24 HOURS

## 2021-08-16 PROCEDURE — 25000003 PHARM REV CODE 250: Performed by: PHYSICIAN ASSISTANT

## 2021-08-16 PROCEDURE — 84100 ASSAY OF PHOSPHORUS: CPT | Performed by: PHYSICIAN ASSISTANT

## 2021-08-16 PROCEDURE — 85025 COMPLETE CBC W/AUTO DIFF WBC: CPT | Mod: 91 | Performed by: NURSE PRACTITIONER

## 2021-08-16 PROCEDURE — 11000004 HC SNF PRIVATE

## 2021-08-16 PROCEDURE — 36415 COLL VENOUS BLD VENIPUNCTURE: CPT | Performed by: HOSPITALIST

## 2021-08-16 PROCEDURE — 99900035 HC TECH TIME PER 15 MIN (STAT)

## 2021-08-16 PROCEDURE — 36415 COLL VENOUS BLD VENIPUNCTURE: CPT | Performed by: PHYSICIAN ASSISTANT

## 2021-08-16 PROCEDURE — 97110 THERAPEUTIC EXERCISES: CPT | Mod: CQ

## 2021-08-16 PROCEDURE — 94761 N-INVAS EAR/PLS OXIMETRY MLT: CPT

## 2021-08-16 PROCEDURE — 83735 ASSAY OF MAGNESIUM: CPT | Performed by: PHYSICIAN ASSISTANT

## 2021-08-16 PROCEDURE — 86920 COMPATIBILITY TEST SPIN: CPT | Performed by: NURSE PRACTITIONER

## 2021-08-16 PROCEDURE — 25000242 PHARM REV CODE 250 ALT 637 W/ HCPCS: Performed by: NURSE PRACTITIONER

## 2021-08-16 PROCEDURE — U0003 INFECTIOUS AGENT DETECTION BY NUCLEIC ACID (DNA OR RNA); SEVERE ACUTE RESPIRATORY SYNDROME CORONAVIRUS 2 (SARS-COV-2) (CORONAVIRUS DISEASE [COVID-19]), AMPLIFIED PROBE TECHNIQUE, MAKING USE OF HIGH THROUGHPUT TECHNOLOGIES AS DESCRIBED BY CMS-2020-01-R: HCPCS | Performed by: NURSE PRACTITIONER

## 2021-08-16 PROCEDURE — 36415 COLL VENOUS BLD VENIPUNCTURE: CPT | Performed by: NURSE PRACTITIONER

## 2021-08-16 PROCEDURE — 25000003 PHARM REV CODE 250: Performed by: NURSE PRACTITIONER

## 2021-08-16 PROCEDURE — P9016 RBC LEUKOCYTES REDUCED: HCPCS | Performed by: NURSE PRACTITIONER

## 2021-08-16 PROCEDURE — 86900 BLOOD TYPING SEROLOGIC ABO: CPT | Performed by: HOSPITALIST

## 2021-08-16 PROCEDURE — 85025 COMPLETE CBC W/AUTO DIFF WBC: CPT | Performed by: PHYSICIAN ASSISTANT

## 2021-08-16 PROCEDURE — 97530 THERAPEUTIC ACTIVITIES: CPT | Mod: CQ

## 2021-08-16 PROCEDURE — 97110 THERAPEUTIC EXERCISES: CPT | Mod: CO

## 2021-08-16 PROCEDURE — 80053 COMPREHEN METABOLIC PANEL: CPT | Performed by: NURSE PRACTITIONER

## 2021-08-16 RX ORDER — PROMETHAZINE HYDROCHLORIDE 12.5 MG/1
12.5 TABLET ORAL EVERY 6 HOURS PRN
Status: DISCONTINUED | OUTPATIENT
Start: 2021-08-16 | End: 2021-09-07 | Stop reason: HOSPADM

## 2021-08-16 RX ORDER — HYDROCODONE BITARTRATE AND ACETAMINOPHEN 10; 325 MG/1; MG/1
1 TABLET ORAL EVERY 12 HOURS PRN
Qty: 60 TABLET | Refills: 0 | Status: SHIPPED | OUTPATIENT
Start: 2021-08-16 | End: 2021-08-27 | Stop reason: HOSPADM

## 2021-08-16 RX ORDER — SODIUM BICARBONATE 650 MG/1
650 TABLET ORAL ONCE
Status: COMPLETED | OUTPATIENT
Start: 2021-08-16 | End: 2021-08-16

## 2021-08-16 RX ORDER — CETIRIZINE HYDROCHLORIDE 5 MG/1
10 TABLET ORAL NIGHTLY
Status: DISCONTINUED | OUTPATIENT
Start: 2021-08-16 | End: 2021-09-07 | Stop reason: HOSPADM

## 2021-08-16 RX ORDER — HYDROCODONE BITARTRATE AND ACETAMINOPHEN 500; 5 MG/1; MG/1
TABLET ORAL
Status: DISCONTINUED | OUTPATIENT
Start: 2021-08-16 | End: 2021-09-07 | Stop reason: HOSPADM

## 2021-08-16 RX ADMIN — LIDOCAINE 1 PATCH: 50 PATCH TOPICAL at 02:08

## 2021-08-16 RX ADMIN — FERROUS SULFATE TAB EC 325 MG (65 MG FE EQUIVALENT) 325 MG: 325 (65 FE) TABLET DELAYED RESPONSE at 11:08

## 2021-08-16 RX ADMIN — TIOTROPIUM BROMIDE INHALATION SPRAY 2 PUFF: 3.12 SPRAY, METERED RESPIRATORY (INHALATION) at 11:08

## 2021-08-16 RX ADMIN — Medication 800 MG: at 11:08

## 2021-08-16 RX ADMIN — PROMETHAZINE HYDROCHLORIDE 12.5 MG: 12.5 TABLET ORAL at 06:08

## 2021-08-16 RX ADMIN — HYDROCODONE BITARTRATE AND ACETAMINOPHEN 1 TABLET: 10; 325 TABLET ORAL at 08:08

## 2021-08-16 RX ADMIN — HYDROCODONE BITARTRATE AND ACETAMINOPHEN 1 TABLET: 10; 325 TABLET ORAL at 11:08

## 2021-08-16 RX ADMIN — MELATONIN TAB 3 MG 6 MG: 3 TAB at 09:08

## 2021-08-16 RX ADMIN — ATORVASTATIN CALCIUM 40 MG: 20 TABLET, FILM COATED ORAL at 11:08

## 2021-08-16 RX ADMIN — Medication 1 CAPSULE: at 11:08

## 2021-08-16 RX ADMIN — CALCIUM CARBONATE (ANTACID) CHEW TAB 500 MG 1500 MG: 500 CHEW TAB at 09:08

## 2021-08-16 RX ADMIN — ONDANSETRON 8 MG: 8 TABLET, ORALLY DISINTEGRATING ORAL at 05:08

## 2021-08-16 RX ADMIN — DICLOFENAC SODIUM 4 G: 10 GEL TOPICAL at 11:08

## 2021-08-16 RX ADMIN — FLUTICASONE FUROATE AND VILANTEROL TRIFENATATE 1 PUFF: 100; 25 POWDER RESPIRATORY (INHALATION) at 11:08

## 2021-08-16 RX ADMIN — HYDROCODONE BITARTRATE AND ACETAMINOPHEN 1 TABLET: 10; 325 TABLET ORAL at 05:08

## 2021-08-16 RX ADMIN — METHOCARBAMOL 500 MG: 500 TABLET ORAL at 03:08

## 2021-08-16 RX ADMIN — CALCIUM CARBONATE (ANTACID) CHEW TAB 500 MG 1500 MG: 500 CHEW TAB at 11:08

## 2021-08-16 RX ADMIN — METHOCARBAMOL 500 MG: 500 TABLET ORAL at 08:08

## 2021-08-16 RX ADMIN — DICLOFENAC SODIUM 4 G: 10 GEL TOPICAL at 09:08

## 2021-08-16 RX ADMIN — SODIUM POLYSTYRENE SULFONATE 15 G: 15 SUSPENSION ORAL; RECTAL at 11:08

## 2021-08-16 RX ADMIN — CETIRIZINE HYDROCHLORIDE 10 MG: 5 TABLET ORAL at 08:08

## 2021-08-16 RX ADMIN — CEFPODOXIME PROXETIL 100 MG: 100 TABLET, FILM COATED ORAL at 09:08

## 2021-08-16 RX ADMIN — GUAIFENESIN AND DEXTROMETHORPHAN HYDROBROMIDE 1 TABLET: 600; 30 TABLET, EXTENDED RELEASE ORAL at 09:08

## 2021-08-16 RX ADMIN — SODIUM BICARBONATE 650 MG TABLET 650 MG: at 11:08

## 2021-08-16 RX ADMIN — METHOCARBAMOL 500 MG: 500 TABLET ORAL at 11:08

## 2021-08-16 RX ADMIN — METOPROLOL SUCCINATE 50 MG: 50 TABLET, EXTENDED RELEASE ORAL at 11:08

## 2021-08-16 RX ADMIN — GABAPENTIN 300 MG: 300 CAPSULE ORAL at 11:08

## 2021-08-16 RX ADMIN — Medication 250 MG: at 11:08

## 2021-08-16 RX ADMIN — GABAPENTIN 300 MG: 300 CAPSULE ORAL at 08:08

## 2021-08-16 RX ADMIN — GABAPENTIN 300 MG: 300 CAPSULE ORAL at 03:08

## 2021-08-16 RX ADMIN — DICLOFENAC SODIUM 4 G: 10 GEL TOPICAL at 03:08

## 2021-08-16 RX ADMIN — Medication 800 MG: at 08:08

## 2021-08-17 LAB
BASOPHILS # BLD AUTO: 0.03 K/UL (ref 0–0.2)
BASOPHILS NFR BLD: 0.4 % (ref 0–1.9)
DIFFERENTIAL METHOD: ABNORMAL
EOSINOPHIL # BLD AUTO: 0.2 K/UL (ref 0–0.5)
EOSINOPHIL NFR BLD: 2.9 % (ref 0–8)
ERYTHROCYTE [DISTWIDTH] IN BLOOD BY AUTOMATED COUNT: 15.9 % (ref 11.5–14.5)
HCT VFR BLD AUTO: 22.3 % (ref 37–48.5)
HGB BLD-MCNC: 7.3 G/DL (ref 12–16)
IMM GRANULOCYTES # BLD AUTO: 0.02 K/UL (ref 0–0.04)
IMM GRANULOCYTES NFR BLD AUTO: 0.3 % (ref 0–0.5)
LYMPHOCYTES # BLD AUTO: 1.1 K/UL (ref 1–4.8)
LYMPHOCYTES NFR BLD: 13.9 % (ref 18–48)
MCH RBC QN AUTO: 30 PG (ref 27–31)
MCHC RBC AUTO-ENTMCNC: 32.7 G/DL (ref 32–36)
MCV RBC AUTO: 92 FL (ref 82–98)
MONOCYTES # BLD AUTO: 0.9 K/UL (ref 0.3–1)
MONOCYTES NFR BLD: 11.1 % (ref 4–15)
NEUTROPHILS # BLD AUTO: 5.7 K/UL (ref 1.8–7.7)
NEUTROPHILS NFR BLD: 71.4 % (ref 38–73)
NRBC BLD-RTO: 0 /100 WBC
PLATELET # BLD AUTO: 260 K/UL (ref 150–450)
PMV BLD AUTO: 10 FL (ref 9.2–12.9)
RBC # BLD AUTO: 2.43 M/UL (ref 4–5.4)
SARS-COV-2 RNA RESP QL NAA+PROBE: NOT DETECTED
WBC # BLD AUTO: 8 K/UL (ref 3.9–12.7)

## 2021-08-17 PROCEDURE — 25000003 PHARM REV CODE 250: Performed by: PHYSICIAN ASSISTANT

## 2021-08-17 PROCEDURE — 94761 N-INVAS EAR/PLS OXIMETRY MLT: CPT

## 2021-08-17 PROCEDURE — 97530 THERAPEUTIC ACTIVITIES: CPT | Mod: CQ

## 2021-08-17 PROCEDURE — 25000003 PHARM REV CODE 250: Performed by: NURSE PRACTITIONER

## 2021-08-17 PROCEDURE — 97110 THERAPEUTIC EXERCISES: CPT | Mod: CQ

## 2021-08-17 PROCEDURE — 27000221 HC OXYGEN, UP TO 24 HOURS

## 2021-08-17 PROCEDURE — 25000242 PHARM REV CODE 250 ALT 637 W/ HCPCS: Performed by: NURSE PRACTITIONER

## 2021-08-17 PROCEDURE — 36415 COLL VENOUS BLD VENIPUNCTURE: CPT | Performed by: NURSE PRACTITIONER

## 2021-08-17 PROCEDURE — 99900035 HC TECH TIME PER 15 MIN (STAT)

## 2021-08-17 PROCEDURE — 85025 COMPLETE CBC W/AUTO DIFF WBC: CPT | Performed by: NURSE PRACTITIONER

## 2021-08-17 PROCEDURE — 97535 SELF CARE MNGMENT TRAINING: CPT | Mod: CO

## 2021-08-17 PROCEDURE — 11000004 HC SNF PRIVATE

## 2021-08-17 RX ADMIN — Medication 1 CAPSULE: at 09:08

## 2021-08-17 RX ADMIN — METHOCARBAMOL 500 MG: 500 TABLET ORAL at 08:08

## 2021-08-17 RX ADMIN — CEFPODOXIME PROXETIL 100 MG: 100 TABLET, FILM COATED ORAL at 09:08

## 2021-08-17 RX ADMIN — CALCIUM CARBONATE (ANTACID) CHEW TAB 500 MG 1500 MG: 500 CHEW TAB at 08:08

## 2021-08-17 RX ADMIN — HYDROCODONE BITARTRATE AND ACETAMINOPHEN 1 TABLET: 5; 325 TABLET ORAL at 03:08

## 2021-08-17 RX ADMIN — LIDOCAINE 1 PATCH: 50 PATCH TOPICAL at 03:08

## 2021-08-17 RX ADMIN — DICLOFENAC SODIUM 4 G: 10 GEL TOPICAL at 10:08

## 2021-08-17 RX ADMIN — Medication 800 MG: at 09:08

## 2021-08-17 RX ADMIN — METHOCARBAMOL 500 MG: 500 TABLET ORAL at 03:08

## 2021-08-17 RX ADMIN — ATORVASTATIN CALCIUM 40 MG: 20 TABLET, FILM COATED ORAL at 09:08

## 2021-08-17 RX ADMIN — GUAIFENESIN AND DEXTROMETHORPHAN HYDROBROMIDE 1 TABLET: 600; 30 TABLET, EXTENDED RELEASE ORAL at 09:08

## 2021-08-17 RX ADMIN — GUAIFENESIN AND DEXTROMETHORPHAN HYDROBROMIDE 1 TABLET: 600; 30 TABLET, EXTENDED RELEASE ORAL at 08:08

## 2021-08-17 RX ADMIN — TIOTROPIUM BROMIDE INHALATION SPRAY 2 PUFF: 3.12 SPRAY, METERED RESPIRATORY (INHALATION) at 09:08

## 2021-08-17 RX ADMIN — METOPROLOL SUCCINATE 50 MG: 50 TABLET, EXTENDED RELEASE ORAL at 09:08

## 2021-08-17 RX ADMIN — METHOCARBAMOL 500 MG: 500 TABLET ORAL at 09:08

## 2021-08-17 RX ADMIN — GABAPENTIN 300 MG: 300 CAPSULE ORAL at 03:08

## 2021-08-17 RX ADMIN — CETIRIZINE HYDROCHLORIDE 10 MG: 5 TABLET ORAL at 08:08

## 2021-08-17 RX ADMIN — DICLOFENAC SODIUM 4 G: 10 GEL TOPICAL at 03:08

## 2021-08-17 RX ADMIN — GABAPENTIN 300 MG: 300 CAPSULE ORAL at 09:08

## 2021-08-17 RX ADMIN — HYDROCODONE BITARTRATE AND ACETAMINOPHEN 1 TABLET: 10; 325 TABLET ORAL at 03:08

## 2021-08-17 RX ADMIN — FERROUS SULFATE TAB EC 325 MG (65 MG FE EQUIVALENT) 325 MG: 325 (65 FE) TABLET DELAYED RESPONSE at 09:08

## 2021-08-17 RX ADMIN — ONDANSETRON 8 MG: 8 TABLET, ORALLY DISINTEGRATING ORAL at 11:08

## 2021-08-17 RX ADMIN — Medication 250 MG: at 09:08

## 2021-08-17 RX ADMIN — Medication 800 MG: at 08:08

## 2021-08-17 RX ADMIN — HYDROCODONE BITARTRATE AND ACETAMINOPHEN 1 TABLET: 10; 325 TABLET ORAL at 08:08

## 2021-08-17 RX ADMIN — DOCUSATE SODIUM 50MG AND SENNOSIDES 8.6MG 1 TABLET: 8.6; 5 TABLET, FILM COATED ORAL at 08:08

## 2021-08-17 RX ADMIN — CALCIUM CARBONATE (ANTACID) CHEW TAB 500 MG 1500 MG: 500 CHEW TAB at 09:08

## 2021-08-17 RX ADMIN — DICLOFENAC SODIUM 4 G: 10 GEL TOPICAL at 08:08

## 2021-08-17 RX ADMIN — FLUTICASONE FUROATE AND VILANTEROL TRIFENATATE 1 PUFF: 100; 25 POWDER RESPIRATORY (INHALATION) at 09:08

## 2021-08-17 RX ADMIN — CEFPODOXIME PROXETIL 100 MG: 100 TABLET, FILM COATED ORAL at 08:08

## 2021-08-17 RX ADMIN — MELATONIN TAB 3 MG 6 MG: 3 TAB at 08:08

## 2021-08-17 RX ADMIN — GABAPENTIN 300 MG: 300 CAPSULE ORAL at 08:08

## 2021-08-18 PROCEDURE — 27000221 HC OXYGEN, UP TO 24 HOURS

## 2021-08-18 PROCEDURE — 11000004 HC SNF PRIVATE

## 2021-08-18 PROCEDURE — 99900035 HC TECH TIME PER 15 MIN (STAT)

## 2021-08-18 PROCEDURE — 94761 N-INVAS EAR/PLS OXIMETRY MLT: CPT

## 2021-08-18 PROCEDURE — 25000003 PHARM REV CODE 250: Performed by: PHYSICIAN ASSISTANT

## 2021-08-18 PROCEDURE — 25000242 PHARM REV CODE 250 ALT 637 W/ HCPCS: Performed by: NURSE PRACTITIONER

## 2021-08-18 PROCEDURE — 25000003 PHARM REV CODE 250: Performed by: NURSE PRACTITIONER

## 2021-08-18 PROCEDURE — 97535 SELF CARE MNGMENT TRAINING: CPT | Mod: CO

## 2021-08-18 PROCEDURE — 97110 THERAPEUTIC EXERCISES: CPT

## 2021-08-18 RX ORDER — TORSEMIDE 20 MG/1
20 TABLET ORAL DAILY
Status: DISCONTINUED | OUTPATIENT
Start: 2021-08-18 | End: 2021-08-19

## 2021-08-18 RX ADMIN — METHOCARBAMOL 500 MG: 500 TABLET ORAL at 02:08

## 2021-08-18 RX ADMIN — GUAIFENESIN AND DEXTROMETHORPHAN HYDROBROMIDE 1 TABLET: 600; 30 TABLET, EXTENDED RELEASE ORAL at 08:08

## 2021-08-18 RX ADMIN — ATORVASTATIN CALCIUM 40 MG: 20 TABLET, FILM COATED ORAL at 08:08

## 2021-08-18 RX ADMIN — Medication 1 CAPSULE: at 08:08

## 2021-08-18 RX ADMIN — CETIRIZINE HYDROCHLORIDE 10 MG: 5 TABLET ORAL at 08:08

## 2021-08-18 RX ADMIN — DICLOFENAC SODIUM 4 G: 10 GEL TOPICAL at 08:08

## 2021-08-18 RX ADMIN — METOPROLOL SUCCINATE 50 MG: 50 TABLET, EXTENDED RELEASE ORAL at 08:08

## 2021-08-18 RX ADMIN — CEFPODOXIME PROXETIL 100 MG: 100 TABLET, FILM COATED ORAL at 08:08

## 2021-08-18 RX ADMIN — METHOCARBAMOL 500 MG: 500 TABLET ORAL at 08:08

## 2021-08-18 RX ADMIN — CALCIUM CARBONATE (ANTACID) CHEW TAB 500 MG 1500 MG: 500 CHEW TAB at 08:08

## 2021-08-18 RX ADMIN — HYDROCODONE BITARTRATE AND ACETAMINOPHEN 1 TABLET: 5; 325 TABLET ORAL at 08:08

## 2021-08-18 RX ADMIN — TIOTROPIUM BROMIDE INHALATION SPRAY 2 PUFF: 3.12 SPRAY, METERED RESPIRATORY (INHALATION) at 08:08

## 2021-08-18 RX ADMIN — FERROUS SULFATE TAB EC 325 MG (65 MG FE EQUIVALENT) 325 MG: 325 (65 FE) TABLET DELAYED RESPONSE at 08:08

## 2021-08-18 RX ADMIN — GABAPENTIN 300 MG: 300 CAPSULE ORAL at 08:08

## 2021-08-18 RX ADMIN — TORSEMIDE 20 MG: 20 TABLET ORAL at 04:08

## 2021-08-18 RX ADMIN — Medication 800 MG: at 08:08

## 2021-08-18 RX ADMIN — DICLOFENAC SODIUM 4 G: 10 GEL TOPICAL at 02:08

## 2021-08-18 RX ADMIN — GABAPENTIN 300 MG: 300 CAPSULE ORAL at 02:08

## 2021-08-18 RX ADMIN — HYDROCODONE BITARTRATE AND ACETAMINOPHEN 1 TABLET: 10; 325 TABLET ORAL at 11:08

## 2021-08-18 RX ADMIN — MELATONIN TAB 3 MG 6 MG: 3 TAB at 08:08

## 2021-08-18 RX ADMIN — Medication 250 MG: at 08:08

## 2021-08-18 RX ADMIN — LIDOCAINE 1 PATCH: 50 PATCH TOPICAL at 02:08

## 2021-08-18 RX ADMIN — FLUTICASONE FUROATE AND VILANTEROL TRIFENATATE 1 PUFF: 100; 25 POWDER RESPIRATORY (INHALATION) at 08:08

## 2021-08-19 ENCOUNTER — LAB VISIT (OUTPATIENT)
Dept: LAB | Facility: OTHER | Age: 71
End: 2021-08-19
Payer: MEDICARE

## 2021-08-19 DIAGNOSIS — Z20.822 ENCOUNTER FOR LABORATORY TESTING FOR COVID-19 VIRUS: ICD-10-CM

## 2021-08-19 LAB
ALBUMIN SERPL BCP-MCNC: 1.8 G/DL (ref 3.5–5.2)
ALP SERPL-CCNC: 95 U/L (ref 55–135)
ALT SERPL W/O P-5'-P-CCNC: 11 U/L (ref 10–44)
ANION GAP SERPL CALC-SCNC: 8 MMOL/L (ref 8–16)
AST SERPL-CCNC: 16 U/L (ref 10–40)
BASOPHILS # BLD AUTO: 0.01 K/UL (ref 0–0.2)
BASOPHILS NFR BLD: 0.2 % (ref 0–1.9)
BILIRUB SERPL-MCNC: 0.2 MG/DL (ref 0.1–1)
BUN SERPL-MCNC: 12 MG/DL (ref 8–23)
CALCIUM SERPL-MCNC: 8.5 MG/DL (ref 8.7–10.5)
CHLORIDE SERPL-SCNC: 98 MMOL/L (ref 95–110)
CO2 SERPL-SCNC: 25 MMOL/L (ref 23–29)
CREAT SERPL-MCNC: 0.7 MG/DL (ref 0.5–1.4)
DIFFERENTIAL METHOD: ABNORMAL
EOSINOPHIL # BLD AUTO: 0.3 K/UL (ref 0–0.5)
EOSINOPHIL NFR BLD: 5.4 % (ref 0–8)
ERYTHROCYTE [DISTWIDTH] IN BLOOD BY AUTOMATED COUNT: 15.4 % (ref 11.5–14.5)
EST. GFR  (AFRICAN AMERICAN): >60 ML/MIN/1.73 M^2
EST. GFR  (NON AFRICAN AMERICAN): >60 ML/MIN/1.73 M^2
GLUCOSE SERPL-MCNC: 82 MG/DL (ref 70–110)
HCT VFR BLD AUTO: 23.4 % (ref 37–48.5)
HGB BLD-MCNC: 7.3 G/DL (ref 12–16)
IMM GRANULOCYTES # BLD AUTO: 0.03 K/UL (ref 0–0.04)
IMM GRANULOCYTES NFR BLD AUTO: 0.6 % (ref 0–0.5)
LYMPHOCYTES # BLD AUTO: 0.9 K/UL (ref 1–4.8)
LYMPHOCYTES NFR BLD: 17.2 % (ref 18–48)
MAGNESIUM SERPL-MCNC: 1.3 MG/DL (ref 1.6–2.6)
MCH RBC QN AUTO: 29.8 PG (ref 27–31)
MCHC RBC AUTO-ENTMCNC: 31.2 G/DL (ref 32–36)
MCV RBC AUTO: 96 FL (ref 82–98)
MONOCYTES # BLD AUTO: 1 K/UL (ref 0.3–1)
MONOCYTES NFR BLD: 17.8 % (ref 4–15)
NEUTROPHILS # BLD AUTO: 3.2 K/UL (ref 1.8–7.7)
NEUTROPHILS NFR BLD: 58.8 % (ref 38–73)
NRBC BLD-RTO: 0 /100 WBC
PHOSPHATE SERPL-MCNC: 5.2 MG/DL (ref 2.7–4.5)
PLATELET # BLD AUTO: 288 K/UL (ref 150–450)
PMV BLD AUTO: 10 FL (ref 9.2–12.9)
POTASSIUM SERPL-SCNC: 4.9 MMOL/L (ref 3.5–5.1)
PROT SERPL-MCNC: 5 G/DL (ref 6–8.4)
RBC # BLD AUTO: 2.45 M/UL (ref 4–5.4)
SODIUM SERPL-SCNC: 131 MMOL/L (ref 136–145)
WBC # BLD AUTO: 5.35 K/UL (ref 3.9–12.7)

## 2021-08-19 PROCEDURE — 36415 COLL VENOUS BLD VENIPUNCTURE: CPT | Performed by: PHYSICIAN ASSISTANT

## 2021-08-19 PROCEDURE — 80053 COMPREHEN METABOLIC PANEL: CPT | Performed by: NURSE PRACTITIONER

## 2021-08-19 PROCEDURE — 25000242 PHARM REV CODE 250 ALT 637 W/ HCPCS: Performed by: NURSE PRACTITIONER

## 2021-08-19 PROCEDURE — 94761 N-INVAS EAR/PLS OXIMETRY MLT: CPT

## 2021-08-19 PROCEDURE — 11000004 HC SNF PRIVATE

## 2021-08-19 PROCEDURE — 27000221 HC OXYGEN, UP TO 24 HOURS

## 2021-08-19 PROCEDURE — 85025 COMPLETE CBC W/AUTO DIFF WBC: CPT | Performed by: PHYSICIAN ASSISTANT

## 2021-08-19 PROCEDURE — 25000003 PHARM REV CODE 250: Performed by: PHYSICIAN ASSISTANT

## 2021-08-19 PROCEDURE — 97110 THERAPEUTIC EXERCISES: CPT | Mod: CQ

## 2021-08-19 PROCEDURE — 99900035 HC TECH TIME PER 15 MIN (STAT)

## 2021-08-19 PROCEDURE — 97530 THERAPEUTIC ACTIVITIES: CPT | Mod: CO

## 2021-08-19 PROCEDURE — 84100 ASSAY OF PHOSPHORUS: CPT | Performed by: PHYSICIAN ASSISTANT

## 2021-08-19 PROCEDURE — U0003 INFECTIOUS AGENT DETECTION BY NUCLEIC ACID (DNA OR RNA); SEVERE ACUTE RESPIRATORY SYNDROME CORONAVIRUS 2 (SARS-COV-2) (CORONAVIRUS DISEASE [COVID-19]), AMPLIFIED PROBE TECHNIQUE, MAKING USE OF HIGH THROUGHPUT TECHNOLOGIES AS DESCRIBED BY CMS-2020-01-R: HCPCS | Performed by: NURSE PRACTITIONER

## 2021-08-19 PROCEDURE — 97530 THERAPEUTIC ACTIVITIES: CPT | Mod: CQ

## 2021-08-19 PROCEDURE — 25000003 PHARM REV CODE 250: Performed by: NURSE PRACTITIONER

## 2021-08-19 PROCEDURE — 97116 GAIT TRAINING THERAPY: CPT | Mod: CQ

## 2021-08-19 PROCEDURE — 83735 ASSAY OF MAGNESIUM: CPT | Performed by: PHYSICIAN ASSISTANT

## 2021-08-19 RX ORDER — TORSEMIDE 20 MG/1
40 TABLET ORAL DAILY
Status: DISCONTINUED | OUTPATIENT
Start: 2021-08-20 | End: 2021-08-26

## 2021-08-19 RX ADMIN — GABAPENTIN 300 MG: 300 CAPSULE ORAL at 08:08

## 2021-08-19 RX ADMIN — CEFPODOXIME PROXETIL 100 MG: 100 TABLET, FILM COATED ORAL at 08:08

## 2021-08-19 RX ADMIN — HYDROCODONE BITARTRATE AND ACETAMINOPHEN 1 TABLET: 10; 325 TABLET ORAL at 08:08

## 2021-08-19 RX ADMIN — FLUTICASONE FUROATE AND VILANTEROL TRIFENATATE 1 PUFF: 100; 25 POWDER RESPIRATORY (INHALATION) at 08:08

## 2021-08-19 RX ADMIN — HYDROCODONE BITARTRATE AND ACETAMINOPHEN 1 TABLET: 10; 325 TABLET ORAL at 06:08

## 2021-08-19 RX ADMIN — METHOCARBAMOL 500 MG: 500 TABLET ORAL at 02:08

## 2021-08-19 RX ADMIN — ATORVASTATIN CALCIUM 40 MG: 20 TABLET, FILM COATED ORAL at 08:08

## 2021-08-19 RX ADMIN — CETIRIZINE HYDROCHLORIDE 10 MG: 5 TABLET ORAL at 08:08

## 2021-08-19 RX ADMIN — GUAIFENESIN AND DEXTROMETHORPHAN HYDROBROMIDE 1 TABLET: 600; 30 TABLET, EXTENDED RELEASE ORAL at 08:08

## 2021-08-19 RX ADMIN — DICLOFENAC SODIUM 4 G: 10 GEL TOPICAL at 02:08

## 2021-08-19 RX ADMIN — FERROUS SULFATE TAB EC 325 MG (65 MG FE EQUIVALENT) 325 MG: 325 (65 FE) TABLET DELAYED RESPONSE at 08:08

## 2021-08-19 RX ADMIN — CALCIUM CARBONATE (ANTACID) CHEW TAB 500 MG 1500 MG: 500 CHEW TAB at 08:08

## 2021-08-19 RX ADMIN — DICLOFENAC SODIUM 4 G: 10 GEL TOPICAL at 08:08

## 2021-08-19 RX ADMIN — Medication 1 CAPSULE: at 08:08

## 2021-08-19 RX ADMIN — METHOCARBAMOL 500 MG: 500 TABLET ORAL at 08:08

## 2021-08-19 RX ADMIN — METOPROLOL SUCCINATE 50 MG: 50 TABLET, EXTENDED RELEASE ORAL at 08:08

## 2021-08-19 RX ADMIN — Medication 800 MG: at 08:08

## 2021-08-19 RX ADMIN — HYDROCODONE BITARTRATE AND ACETAMINOPHEN 1 TABLET: 10; 325 TABLET ORAL at 01:08

## 2021-08-19 RX ADMIN — MELATONIN TAB 3 MG 6 MG: 3 TAB at 08:08

## 2021-08-19 RX ADMIN — TIOTROPIUM BROMIDE INHALATION SPRAY 2 PUFF: 3.12 SPRAY, METERED RESPIRATORY (INHALATION) at 08:08

## 2021-08-19 RX ADMIN — LIDOCAINE 1 PATCH: 50 PATCH TOPICAL at 01:08

## 2021-08-19 RX ADMIN — TORSEMIDE 20 MG: 20 TABLET ORAL at 08:08

## 2021-08-19 RX ADMIN — GABAPENTIN 300 MG: 300 CAPSULE ORAL at 02:08

## 2021-08-20 LAB
SARS-COV-2 RNA RESP QL NAA+PROBE: NOT DETECTED
SARS-COV-2- CYCLE NUMBER: -1

## 2021-08-20 PROCEDURE — 99900035 HC TECH TIME PER 15 MIN (STAT)

## 2021-08-20 PROCEDURE — 97530 THERAPEUTIC ACTIVITIES: CPT | Mod: CO

## 2021-08-20 PROCEDURE — 25000003 PHARM REV CODE 250: Performed by: NURSE PRACTITIONER

## 2021-08-20 PROCEDURE — 25000003 PHARM REV CODE 250: Performed by: PHYSICIAN ASSISTANT

## 2021-08-20 PROCEDURE — 94761 N-INVAS EAR/PLS OXIMETRY MLT: CPT

## 2021-08-20 PROCEDURE — 11000004 HC SNF PRIVATE

## 2021-08-20 PROCEDURE — 97110 THERAPEUTIC EXERCISES: CPT | Mod: CO

## 2021-08-20 PROCEDURE — 27000221 HC OXYGEN, UP TO 24 HOURS

## 2021-08-20 PROCEDURE — 97535 SELF CARE MNGMENT TRAINING: CPT | Mod: CO

## 2021-08-20 PROCEDURE — 25000242 PHARM REV CODE 250 ALT 637 W/ HCPCS: Performed by: NURSE PRACTITIONER

## 2021-08-20 RX ADMIN — Medication 1 CAPSULE: at 10:08

## 2021-08-20 RX ADMIN — DICLOFENAC SODIUM 4 G: 10 GEL TOPICAL at 09:08

## 2021-08-20 RX ADMIN — HYDROCODONE BITARTRATE AND ACETAMINOPHEN 1 TABLET: 5; 325 TABLET ORAL at 11:08

## 2021-08-20 RX ADMIN — HYDROCODONE BITARTRATE AND ACETAMINOPHEN 1 TABLET: 10; 325 TABLET ORAL at 08:08

## 2021-08-20 RX ADMIN — CETIRIZINE HYDROCHLORIDE 10 MG: 5 TABLET ORAL at 09:08

## 2021-08-20 RX ADMIN — Medication 800 MG: at 10:08

## 2021-08-20 RX ADMIN — DICLOFENAC SODIUM 4 G: 10 GEL TOPICAL at 02:08

## 2021-08-20 RX ADMIN — Medication 250 MG: at 10:08

## 2021-08-20 RX ADMIN — TIOTROPIUM BROMIDE INHALATION SPRAY 2 PUFF: 3.12 SPRAY, METERED RESPIRATORY (INHALATION) at 10:08

## 2021-08-20 RX ADMIN — METOPROLOL SUCCINATE 50 MG: 50 TABLET, EXTENDED RELEASE ORAL at 10:08

## 2021-08-20 RX ADMIN — Medication 800 MG: at 09:08

## 2021-08-20 RX ADMIN — CALCIUM CARBONATE (ANTACID) CHEW TAB 500 MG 1500 MG: 500 CHEW TAB at 09:08

## 2021-08-20 RX ADMIN — LIDOCAINE 1 PATCH: 50 PATCH TOPICAL at 02:08

## 2021-08-20 RX ADMIN — TORSEMIDE 40 MG: 20 TABLET ORAL at 10:08

## 2021-08-20 RX ADMIN — METHOCARBAMOL 500 MG: 500 TABLET ORAL at 09:08

## 2021-08-20 RX ADMIN — CEFPODOXIME PROXETIL 100 MG: 100 TABLET, FILM COATED ORAL at 10:08

## 2021-08-20 RX ADMIN — METHOCARBAMOL 500 MG: 500 TABLET ORAL at 10:08

## 2021-08-20 RX ADMIN — GUAIFENESIN AND DEXTROMETHORPHAN HYDROBROMIDE 1 TABLET: 600; 30 TABLET, EXTENDED RELEASE ORAL at 10:08

## 2021-08-20 RX ADMIN — FLUTICASONE FUROATE AND VILANTEROL TRIFENATATE 1 PUFF: 100; 25 POWDER RESPIRATORY (INHALATION) at 10:08

## 2021-08-20 RX ADMIN — ATORVASTATIN CALCIUM 40 MG: 20 TABLET, FILM COATED ORAL at 10:08

## 2021-08-20 RX ADMIN — DICLOFENAC SODIUM 4 G: 10 GEL TOPICAL at 10:08

## 2021-08-20 RX ADMIN — CALCIUM CARBONATE (ANTACID) CHEW TAB 500 MG 1500 MG: 500 CHEW TAB at 10:08

## 2021-08-20 RX ADMIN — MELATONIN TAB 3 MG 6 MG: 3 TAB at 09:08

## 2021-08-20 RX ADMIN — GUAIFENESIN AND DEXTROMETHORPHAN HYDROBROMIDE 1 TABLET: 600; 30 TABLET, EXTENDED RELEASE ORAL at 09:08

## 2021-08-20 RX ADMIN — METHOCARBAMOL 500 MG: 500 TABLET ORAL at 02:08

## 2021-08-20 RX ADMIN — FERROUS SULFATE TAB EC 325 MG (65 MG FE EQUIVALENT) 325 MG: 325 (65 FE) TABLET DELAYED RESPONSE at 10:08

## 2021-08-21 PROCEDURE — 27000221 HC OXYGEN, UP TO 24 HOURS

## 2021-08-21 PROCEDURE — 97530 THERAPEUTIC ACTIVITIES: CPT

## 2021-08-21 PROCEDURE — 25000003 PHARM REV CODE 250: Performed by: PHYSICIAN ASSISTANT

## 2021-08-21 PROCEDURE — 25000003 PHARM REV CODE 250: Performed by: NURSE PRACTITIONER

## 2021-08-21 PROCEDURE — 11000004 HC SNF PRIVATE

## 2021-08-21 PROCEDURE — 97535 SELF CARE MNGMENT TRAINING: CPT

## 2021-08-21 PROCEDURE — 94761 N-INVAS EAR/PLS OXIMETRY MLT: CPT

## 2021-08-21 RX ADMIN — CALCIUM CARBONATE (ANTACID) CHEW TAB 500 MG 1500 MG: 500 CHEW TAB at 08:08

## 2021-08-21 RX ADMIN — METHOCARBAMOL 500 MG: 500 TABLET ORAL at 08:08

## 2021-08-21 RX ADMIN — ATORVASTATIN CALCIUM 40 MG: 20 TABLET, FILM COATED ORAL at 08:08

## 2021-08-21 RX ADMIN — GABAPENTIN 300 MG: 300 CAPSULE ORAL at 02:08

## 2021-08-21 RX ADMIN — HYDROCODONE BITARTRATE AND ACETAMINOPHEN 1 TABLET: 10; 325 TABLET ORAL at 06:08

## 2021-08-21 RX ADMIN — CETIRIZINE HYDROCHLORIDE 10 MG: 5 TABLET ORAL at 08:08

## 2021-08-21 RX ADMIN — METHOCARBAMOL 500 MG: 500 TABLET ORAL at 02:08

## 2021-08-21 RX ADMIN — GUAIFENESIN AND DEXTROMETHORPHAN HYDROBROMIDE 1 TABLET: 600; 30 TABLET, EXTENDED RELEASE ORAL at 08:08

## 2021-08-21 RX ADMIN — DICLOFENAC SODIUM 4 G: 10 GEL TOPICAL at 08:08

## 2021-08-21 RX ADMIN — MELATONIN TAB 3 MG 6 MG: 3 TAB at 08:08

## 2021-08-21 RX ADMIN — LIDOCAINE 1 PATCH: 50 PATCH TOPICAL at 02:08

## 2021-08-21 RX ADMIN — Medication 1 CAPSULE: at 08:08

## 2021-08-21 RX ADMIN — GABAPENTIN 300 MG: 300 CAPSULE ORAL at 08:08

## 2021-08-21 RX ADMIN — Medication 800 MG: at 08:08

## 2021-08-21 RX ADMIN — FERROUS SULFATE TAB EC 325 MG (65 MG FE EQUIVALENT) 325 MG: 325 (65 FE) TABLET DELAYED RESPONSE at 08:08

## 2021-08-21 RX ADMIN — Medication 250 MG: at 08:08

## 2021-08-21 RX ADMIN — FLUTICASONE FUROATE AND VILANTEROL TRIFENATATE 1 PUFF: 100; 25 POWDER RESPIRATORY (INHALATION) at 08:08

## 2021-08-21 RX ADMIN — TORSEMIDE 40 MG: 20 TABLET ORAL at 08:08

## 2021-08-21 RX ADMIN — DICLOFENAC SODIUM 4 G: 10 GEL TOPICAL at 02:08

## 2021-08-21 RX ADMIN — METOPROLOL SUCCINATE 50 MG: 50 TABLET, EXTENDED RELEASE ORAL at 08:08

## 2021-08-21 RX ADMIN — TIOTROPIUM BROMIDE INHALATION SPRAY 2 PUFF: 3.12 SPRAY, METERED RESPIRATORY (INHALATION) at 08:08

## 2021-08-22 PROCEDURE — 94761 N-INVAS EAR/PLS OXIMETRY MLT: CPT

## 2021-08-22 PROCEDURE — 11000004 HC SNF PRIVATE

## 2021-08-22 PROCEDURE — 97110 THERAPEUTIC EXERCISES: CPT | Mod: CQ

## 2021-08-22 PROCEDURE — 27000221 HC OXYGEN, UP TO 24 HOURS

## 2021-08-22 PROCEDURE — 97530 THERAPEUTIC ACTIVITIES: CPT | Mod: CQ

## 2021-08-22 PROCEDURE — 25000003 PHARM REV CODE 250: Performed by: NURSE PRACTITIONER

## 2021-08-22 PROCEDURE — 97116 GAIT TRAINING THERAPY: CPT | Mod: CQ

## 2021-08-22 PROCEDURE — 25000003 PHARM REV CODE 250: Performed by: PHYSICIAN ASSISTANT

## 2021-08-22 RX ADMIN — ATORVASTATIN CALCIUM 40 MG: 20 TABLET, FILM COATED ORAL at 10:08

## 2021-08-22 RX ADMIN — Medication 250 MG: at 10:08

## 2021-08-22 RX ADMIN — GABAPENTIN 300 MG: 300 CAPSULE ORAL at 09:08

## 2021-08-22 RX ADMIN — CETIRIZINE HYDROCHLORIDE 10 MG: 5 TABLET ORAL at 09:08

## 2021-08-22 RX ADMIN — Medication 1 CAPSULE: at 10:08

## 2021-08-22 RX ADMIN — CALCIUM CARBONATE (ANTACID) CHEW TAB 500 MG 1500 MG: 500 CHEW TAB at 10:08

## 2021-08-22 RX ADMIN — DICLOFENAC SODIUM 4 G: 10 GEL TOPICAL at 10:08

## 2021-08-22 RX ADMIN — FERROUS SULFATE TAB EC 325 MG (65 MG FE EQUIVALENT) 325 MG: 325 (65 FE) TABLET DELAYED RESPONSE at 10:08

## 2021-08-22 RX ADMIN — HYDROCODONE BITARTRATE AND ACETAMINOPHEN 1 TABLET: 10; 325 TABLET ORAL at 03:08

## 2021-08-22 RX ADMIN — GUAIFENESIN AND DEXTROMETHORPHAN HYDROBROMIDE 1 TABLET: 600; 30 TABLET, EXTENDED RELEASE ORAL at 09:08

## 2021-08-22 RX ADMIN — FLUTICASONE FUROATE AND VILANTEROL TRIFENATATE 1 PUFF: 100; 25 POWDER RESPIRATORY (INHALATION) at 10:08

## 2021-08-22 RX ADMIN — TORSEMIDE 40 MG: 20 TABLET ORAL at 10:08

## 2021-08-22 RX ADMIN — GUAIFENESIN AND DEXTROMETHORPHAN HYDROBROMIDE 1 TABLET: 600; 30 TABLET, EXTENDED RELEASE ORAL at 10:08

## 2021-08-22 RX ADMIN — GABAPENTIN 300 MG: 300 CAPSULE ORAL at 10:08

## 2021-08-22 RX ADMIN — TIOTROPIUM BROMIDE INHALATION SPRAY 2 PUFF: 3.12 SPRAY, METERED RESPIRATORY (INHALATION) at 10:08

## 2021-08-22 RX ADMIN — LIDOCAINE 1 PATCH: 50 PATCH TOPICAL at 03:08

## 2021-08-22 RX ADMIN — DICLOFENAC SODIUM 4 G: 10 GEL TOPICAL at 09:08

## 2021-08-22 RX ADMIN — METOPROLOL SUCCINATE 50 MG: 50 TABLET, EXTENDED RELEASE ORAL at 10:08

## 2021-08-22 RX ADMIN — CALCIUM CARBONATE (ANTACID) CHEW TAB 500 MG 1500 MG: 500 CHEW TAB at 09:08

## 2021-08-22 RX ADMIN — Medication 800 MG: at 09:08

## 2021-08-22 RX ADMIN — METHOCARBAMOL 500 MG: 500 TABLET ORAL at 09:08

## 2021-08-22 RX ADMIN — Medication 800 MG: at 10:08

## 2021-08-22 RX ADMIN — MELATONIN TAB 3 MG 6 MG: 3 TAB at 09:08

## 2021-08-22 RX ADMIN — DICLOFENAC SODIUM 4 G: 10 GEL TOPICAL at 03:08

## 2021-08-22 RX ADMIN — METHOCARBAMOL 500 MG: 500 TABLET ORAL at 10:08

## 2021-08-22 RX ADMIN — METHOCARBAMOL 500 MG: 500 TABLET ORAL at 03:08

## 2021-08-22 RX ADMIN — HYDROCODONE BITARTRATE AND ACETAMINOPHEN 1 TABLET: 10; 325 TABLET ORAL at 10:08

## 2021-08-23 ENCOUNTER — LAB VISIT (OUTPATIENT)
Dept: LAB | Facility: OTHER | Age: 71
End: 2021-08-23
Payer: MEDICARE

## 2021-08-23 DIAGNOSIS — Z20.822 ENCOUNTER FOR LABORATORY TESTING FOR COVID-19 VIRUS: ICD-10-CM

## 2021-08-23 LAB
ALBUMIN SERPL BCP-MCNC: 1.8 G/DL (ref 3.5–5.2)
ALP SERPL-CCNC: 88 U/L (ref 55–135)
ALT SERPL W/O P-5'-P-CCNC: 13 U/L (ref 10–44)
ANION GAP SERPL CALC-SCNC: 10 MMOL/L (ref 8–16)
AST SERPL-CCNC: 22 U/L (ref 10–40)
BASOPHILS # BLD AUTO: 0.01 K/UL (ref 0–0.2)
BASOPHILS NFR BLD: 0.2 % (ref 0–1.9)
BILIRUB SERPL-MCNC: 0.2 MG/DL (ref 0.1–1)
BUN SERPL-MCNC: 15 MG/DL (ref 8–23)
CALCIUM SERPL-MCNC: 8.2 MG/DL (ref 8.7–10.5)
CHLORIDE SERPL-SCNC: 91 MMOL/L (ref 95–110)
CO2 SERPL-SCNC: 31 MMOL/L (ref 23–29)
CREAT SERPL-MCNC: 0.8 MG/DL (ref 0.5–1.4)
DIFFERENTIAL METHOD: ABNORMAL
EOSINOPHIL # BLD AUTO: 0.3 K/UL (ref 0–0.5)
EOSINOPHIL NFR BLD: 6.1 % (ref 0–8)
ERYTHROCYTE [DISTWIDTH] IN BLOOD BY AUTOMATED COUNT: 15 % (ref 11.5–14.5)
EST. GFR  (AFRICAN AMERICAN): >60 ML/MIN/1.73 M^2
EST. GFR  (NON AFRICAN AMERICAN): >60 ML/MIN/1.73 M^2
GLUCOSE SERPL-MCNC: 76 MG/DL (ref 70–110)
HCT VFR BLD AUTO: 21.8 % (ref 37–48.5)
HGB BLD-MCNC: 7 G/DL (ref 12–16)
IMM GRANULOCYTES # BLD AUTO: 0.03 K/UL (ref 0–0.04)
IMM GRANULOCYTES NFR BLD AUTO: 0.7 % (ref 0–0.5)
LYMPHOCYTES # BLD AUTO: 1.3 K/UL (ref 1–4.8)
LYMPHOCYTES NFR BLD: 31.2 % (ref 18–48)
MAGNESIUM SERPL-MCNC: 1.3 MG/DL (ref 1.6–2.6)
MCH RBC QN AUTO: 29.9 PG (ref 27–31)
MCHC RBC AUTO-ENTMCNC: 32.1 G/DL (ref 32–36)
MCV RBC AUTO: 93 FL (ref 82–98)
MONOCYTES # BLD AUTO: 0.9 K/UL (ref 0.3–1)
MONOCYTES NFR BLD: 21.6 % (ref 4–15)
NEUTROPHILS # BLD AUTO: 1.6 K/UL (ref 1.8–7.7)
NEUTROPHILS NFR BLD: 40.2 % (ref 38–73)
NRBC BLD-RTO: 0 /100 WBC
PHOSPHATE SERPL-MCNC: 4.7 MG/DL (ref 2.7–4.5)
PLATELET # BLD AUTO: 272 K/UL (ref 150–450)
PMV BLD AUTO: 9.7 FL (ref 9.2–12.9)
POTASSIUM SERPL-SCNC: 4 MMOL/L (ref 3.5–5.1)
PROT SERPL-MCNC: 5.1 G/DL (ref 6–8.4)
RBC # BLD AUTO: 2.34 M/UL (ref 4–5.4)
SODIUM SERPL-SCNC: 132 MMOL/L (ref 136–145)
WBC # BLD AUTO: 4.07 K/UL (ref 3.9–12.7)

## 2021-08-23 PROCEDURE — 97116 GAIT TRAINING THERAPY: CPT | Mod: CQ

## 2021-08-23 PROCEDURE — 83735 ASSAY OF MAGNESIUM: CPT | Performed by: PHYSICIAN ASSISTANT

## 2021-08-23 PROCEDURE — 25000003 PHARM REV CODE 250: Performed by: PHYSICIAN ASSISTANT

## 2021-08-23 PROCEDURE — U0003 INFECTIOUS AGENT DETECTION BY NUCLEIC ACID (DNA OR RNA); SEVERE ACUTE RESPIRATORY SYNDROME CORONAVIRUS 2 (SARS-COV-2) (CORONAVIRUS DISEASE [COVID-19]), AMPLIFIED PROBE TECHNIQUE, MAKING USE OF HIGH THROUGHPUT TECHNOLOGIES AS DESCRIBED BY CMS-2020-01-R: HCPCS | Performed by: NURSE PRACTITIONER

## 2021-08-23 PROCEDURE — 97110 THERAPEUTIC EXERCISES: CPT | Mod: CQ

## 2021-08-23 PROCEDURE — 80053 COMPREHEN METABOLIC PANEL: CPT | Performed by: NURSE PRACTITIONER

## 2021-08-23 PROCEDURE — 36415 COLL VENOUS BLD VENIPUNCTURE: CPT | Performed by: PHYSICIAN ASSISTANT

## 2021-08-23 PROCEDURE — 85025 COMPLETE CBC W/AUTO DIFF WBC: CPT | Performed by: PHYSICIAN ASSISTANT

## 2021-08-23 PROCEDURE — 84100 ASSAY OF PHOSPHORUS: CPT | Performed by: PHYSICIAN ASSISTANT

## 2021-08-23 PROCEDURE — 94761 N-INVAS EAR/PLS OXIMETRY MLT: CPT

## 2021-08-23 PROCEDURE — 27000221 HC OXYGEN, UP TO 24 HOURS

## 2021-08-23 PROCEDURE — 97530 THERAPEUTIC ACTIVITIES: CPT | Mod: CQ

## 2021-08-23 PROCEDURE — 25000003 PHARM REV CODE 250: Performed by: STUDENT IN AN ORGANIZED HEALTH CARE EDUCATION/TRAINING PROGRAM

## 2021-08-23 PROCEDURE — 11000004 HC SNF PRIVATE

## 2021-08-23 PROCEDURE — 97530 THERAPEUTIC ACTIVITIES: CPT

## 2021-08-23 PROCEDURE — 97110 THERAPEUTIC EXERCISES: CPT

## 2021-08-23 PROCEDURE — 25000003 PHARM REV CODE 250: Performed by: NURSE PRACTITIONER

## 2021-08-23 PROCEDURE — 97535 SELF CARE MNGMENT TRAINING: CPT

## 2021-08-23 RX ORDER — TORSEMIDE 20 MG/1
40 TABLET ORAL ONCE
Status: COMPLETED | OUTPATIENT
Start: 2021-08-23 | End: 2021-08-23

## 2021-08-23 RX ORDER — FERROUS SULFATE 325(65) MG
325 TABLET, DELAYED RELEASE (ENTERIC COATED) ORAL DAILY
Qty: 30 TABLET | Refills: 11 | Status: CANCELLED | OUTPATIENT
Start: 2021-08-24

## 2021-08-23 RX ADMIN — GUAIFENESIN AND DEXTROMETHORPHAN HYDROBROMIDE 1 TABLET: 600; 30 TABLET, EXTENDED RELEASE ORAL at 09:08

## 2021-08-23 RX ADMIN — TORSEMIDE 40 MG: 20 TABLET ORAL at 05:08

## 2021-08-23 RX ADMIN — CALCIUM CARBONATE (ANTACID) CHEW TAB 500 MG 1500 MG: 500 CHEW TAB at 08:08

## 2021-08-23 RX ADMIN — METHOCARBAMOL 500 MG: 500 TABLET ORAL at 09:08

## 2021-08-23 RX ADMIN — HYDROCODONE BITARTRATE AND ACETAMINOPHEN 1 TABLET: 10; 325 TABLET ORAL at 08:08

## 2021-08-23 RX ADMIN — DICLOFENAC SODIUM 4 G: 10 GEL TOPICAL at 08:08

## 2021-08-23 RX ADMIN — FERROUS SULFATE TAB EC 325 MG (65 MG FE EQUIVALENT) 325 MG: 325 (65 FE) TABLET DELAYED RESPONSE at 09:08

## 2021-08-23 RX ADMIN — GUAIFENESIN AND DEXTROMETHORPHAN HYDROBROMIDE 1 TABLET: 600; 30 TABLET, EXTENDED RELEASE ORAL at 08:08

## 2021-08-23 RX ADMIN — ATORVASTATIN CALCIUM 40 MG: 20 TABLET, FILM COATED ORAL at 09:08

## 2021-08-23 RX ADMIN — Medication 800 MG: at 09:08

## 2021-08-23 RX ADMIN — HYDROCODONE BITARTRATE AND ACETAMINOPHEN 1 TABLET: 10; 325 TABLET ORAL at 09:08

## 2021-08-23 RX ADMIN — Medication 1 CAPSULE: at 09:08

## 2021-08-23 RX ADMIN — DICLOFENAC SODIUM 4 G: 10 GEL TOPICAL at 09:08

## 2021-08-23 RX ADMIN — METHOCARBAMOL 500 MG: 500 TABLET ORAL at 02:08

## 2021-08-23 RX ADMIN — METOPROLOL SUCCINATE 50 MG: 50 TABLET, EXTENDED RELEASE ORAL at 09:08

## 2021-08-23 RX ADMIN — TIOTROPIUM BROMIDE INHALATION SPRAY 2 PUFF: 3.12 SPRAY, METERED RESPIRATORY (INHALATION) at 09:08

## 2021-08-23 RX ADMIN — LIDOCAINE 1 PATCH: 50 PATCH TOPICAL at 02:08

## 2021-08-23 RX ADMIN — TORSEMIDE 40 MG: 20 TABLET ORAL at 09:08

## 2021-08-23 RX ADMIN — FLUTICASONE FUROATE AND VILANTEROL TRIFENATATE 1 PUFF: 100; 25 POWDER RESPIRATORY (INHALATION) at 09:08

## 2021-08-23 RX ADMIN — Medication 250 MG: at 09:08

## 2021-08-23 RX ADMIN — Medication 800 MG: at 08:08

## 2021-08-23 RX ADMIN — METHOCARBAMOL 500 MG: 500 TABLET ORAL at 08:08

## 2021-08-23 RX ADMIN — CALCIUM CARBONATE (ANTACID) CHEW TAB 500 MG 1500 MG: 500 CHEW TAB at 09:08

## 2021-08-23 RX ADMIN — CETIRIZINE HYDROCHLORIDE 10 MG: 5 TABLET ORAL at 08:08

## 2021-08-23 RX ADMIN — DICLOFENAC SODIUM 4 G: 10 GEL TOPICAL at 02:08

## 2021-08-23 RX ADMIN — MELATONIN TAB 3 MG 6 MG: 3 TAB at 08:08

## 2021-08-24 LAB
SARS-COV-2 RNA RESP QL NAA+PROBE: NOT DETECTED
SARS-COV-2- CYCLE NUMBER: NORMAL

## 2021-08-24 PROCEDURE — 97530 THERAPEUTIC ACTIVITIES: CPT | Mod: CQ

## 2021-08-24 PROCEDURE — 97110 THERAPEUTIC EXERCISES: CPT | Mod: CO

## 2021-08-24 PROCEDURE — 97116 GAIT TRAINING THERAPY: CPT | Mod: CQ

## 2021-08-24 PROCEDURE — 25000003 PHARM REV CODE 250: Performed by: PHYSICIAN ASSISTANT

## 2021-08-24 PROCEDURE — 99900035 HC TECH TIME PER 15 MIN (STAT)

## 2021-08-24 PROCEDURE — 11000004 HC SNF PRIVATE

## 2021-08-24 PROCEDURE — 94761 N-INVAS EAR/PLS OXIMETRY MLT: CPT

## 2021-08-24 PROCEDURE — 25000003 PHARM REV CODE 250: Performed by: NURSE PRACTITIONER

## 2021-08-24 PROCEDURE — 97110 THERAPEUTIC EXERCISES: CPT | Mod: CQ

## 2021-08-24 PROCEDURE — 27000221 HC OXYGEN, UP TO 24 HOURS

## 2021-08-24 PROCEDURE — 97535 SELF CARE MNGMENT TRAINING: CPT | Mod: CO

## 2021-08-24 RX ADMIN — FLUTICASONE FUROATE AND VILANTEROL TRIFENATATE 1 PUFF: 100; 25 POWDER RESPIRATORY (INHALATION) at 10:08

## 2021-08-24 RX ADMIN — METOPROLOL SUCCINATE 50 MG: 50 TABLET, EXTENDED RELEASE ORAL at 10:08

## 2021-08-24 RX ADMIN — FERROUS SULFATE TAB EC 325 MG (65 MG FE EQUIVALENT) 325 MG: 325 (65 FE) TABLET DELAYED RESPONSE at 10:08

## 2021-08-24 RX ADMIN — Medication 800 MG: at 09:08

## 2021-08-24 RX ADMIN — METHOCARBAMOL 500 MG: 500 TABLET ORAL at 02:08

## 2021-08-24 RX ADMIN — DICLOFENAC SODIUM 4 G: 10 GEL TOPICAL at 09:08

## 2021-08-24 RX ADMIN — CALCIUM CARBONATE (ANTACID) CHEW TAB 500 MG 1500 MG: 500 CHEW TAB at 10:08

## 2021-08-24 RX ADMIN — GUAIFENESIN AND DEXTROMETHORPHAN HYDROBROMIDE 1 TABLET: 600; 30 TABLET, EXTENDED RELEASE ORAL at 09:08

## 2021-08-24 RX ADMIN — HYDROCODONE BITARTRATE AND ACETAMINOPHEN 1 TABLET: 10; 325 TABLET ORAL at 10:08

## 2021-08-24 RX ADMIN — LIDOCAINE 1 PATCH: 50 PATCH TOPICAL at 02:08

## 2021-08-24 RX ADMIN — ATORVASTATIN CALCIUM 40 MG: 20 TABLET, FILM COATED ORAL at 10:08

## 2021-08-24 RX ADMIN — TIOTROPIUM BROMIDE INHALATION SPRAY 2 PUFF: 3.12 SPRAY, METERED RESPIRATORY (INHALATION) at 10:08

## 2021-08-24 RX ADMIN — TORSEMIDE 40 MG: 20 TABLET ORAL at 10:08

## 2021-08-24 RX ADMIN — GUAIFENESIN AND DEXTROMETHORPHAN HYDROBROMIDE 1 TABLET: 600; 30 TABLET, EXTENDED RELEASE ORAL at 10:08

## 2021-08-24 RX ADMIN — Medication 800 MG: at 10:08

## 2021-08-24 RX ADMIN — DICLOFENAC SODIUM 4 G: 10 GEL TOPICAL at 02:08

## 2021-08-24 RX ADMIN — MELATONIN TAB 3 MG 6 MG: 3 TAB at 09:08

## 2021-08-24 RX ADMIN — DICLOFENAC SODIUM 4 G: 10 GEL TOPICAL at 10:08

## 2021-08-24 RX ADMIN — HYDROCODONE BITARTRATE AND ACETAMINOPHEN 1 TABLET: 10; 325 TABLET ORAL at 05:08

## 2021-08-24 RX ADMIN — Medication 1 CAPSULE: at 10:08

## 2021-08-24 RX ADMIN — Medication 250 MG: at 10:08

## 2021-08-24 RX ADMIN — CETIRIZINE HYDROCHLORIDE 10 MG: 5 TABLET ORAL at 09:08

## 2021-08-24 RX ADMIN — METHOCARBAMOL 500 MG: 500 TABLET ORAL at 09:08

## 2021-08-24 RX ADMIN — METHOCARBAMOL 500 MG: 500 TABLET ORAL at 10:08

## 2021-08-24 RX ADMIN — CALCIUM CARBONATE (ANTACID) CHEW TAB 500 MG 1500 MG: 500 CHEW TAB at 09:08

## 2021-08-25 PROCEDURE — 94761 N-INVAS EAR/PLS OXIMETRY MLT: CPT

## 2021-08-25 PROCEDURE — 97110 THERAPEUTIC EXERCISES: CPT

## 2021-08-25 PROCEDURE — 25000003 PHARM REV CODE 250: Performed by: NURSE PRACTITIONER

## 2021-08-25 PROCEDURE — 11000004 HC SNF PRIVATE

## 2021-08-25 PROCEDURE — 97530 THERAPEUTIC ACTIVITIES: CPT | Mod: CO

## 2021-08-25 PROCEDURE — 97535 SELF CARE MNGMENT TRAINING: CPT | Mod: CO

## 2021-08-25 PROCEDURE — 27000221 HC OXYGEN, UP TO 24 HOURS

## 2021-08-25 PROCEDURE — 25000003 PHARM REV CODE 250: Performed by: PHYSICIAN ASSISTANT

## 2021-08-25 PROCEDURE — 97116 GAIT TRAINING THERAPY: CPT

## 2021-08-25 RX ORDER — LANOLIN ALCOHOL/MO/W.PET/CERES
1 CREAM (GRAM) TOPICAL DAILY
Status: DISCONTINUED | OUTPATIENT
Start: 2021-08-26 | End: 2021-09-07 | Stop reason: HOSPADM

## 2021-08-25 RX ADMIN — GUAIFENESIN AND DEXTROMETHORPHAN HYDROBROMIDE 1 TABLET: 600; 30 TABLET, EXTENDED RELEASE ORAL at 08:08

## 2021-08-25 RX ADMIN — METOPROLOL SUCCINATE 50 MG: 50 TABLET, EXTENDED RELEASE ORAL at 08:08

## 2021-08-25 RX ADMIN — HYDROCODONE BITARTRATE AND ACETAMINOPHEN 1 TABLET: 10; 325 TABLET ORAL at 08:08

## 2021-08-25 RX ADMIN — METHOCARBAMOL 500 MG: 500 TABLET ORAL at 08:08

## 2021-08-25 RX ADMIN — CALCIUM CARBONATE (ANTACID) CHEW TAB 500 MG 1500 MG: 500 CHEW TAB at 08:08

## 2021-08-25 RX ADMIN — FLUTICASONE FUROATE AND VILANTEROL TRIFENATATE 1 PUFF: 100; 25 POWDER RESPIRATORY (INHALATION) at 08:08

## 2021-08-25 RX ADMIN — Medication 800 MG: at 08:08

## 2021-08-25 RX ADMIN — ATORVASTATIN CALCIUM 40 MG: 20 TABLET, FILM COATED ORAL at 08:08

## 2021-08-25 RX ADMIN — Medication 1 CAPSULE: at 08:08

## 2021-08-25 RX ADMIN — LIDOCAINE 1 PATCH: 50 PATCH TOPICAL at 02:08

## 2021-08-25 RX ADMIN — DICLOFENAC SODIUM 4 G: 10 GEL TOPICAL at 02:08

## 2021-08-25 RX ADMIN — TIOTROPIUM BROMIDE INHALATION SPRAY 2 PUFF: 3.12 SPRAY, METERED RESPIRATORY (INHALATION) at 08:08

## 2021-08-25 RX ADMIN — Medication 250 MG: at 08:08

## 2021-08-25 RX ADMIN — DICLOFENAC SODIUM 4 G: 10 GEL TOPICAL at 08:08

## 2021-08-25 RX ADMIN — FERROUS SULFATE TAB EC 325 MG (65 MG FE EQUIVALENT) 325 MG: 325 (65 FE) TABLET DELAYED RESPONSE at 09:08

## 2021-08-25 RX ADMIN — CETIRIZINE HYDROCHLORIDE 10 MG: 5 TABLET ORAL at 08:08

## 2021-08-25 RX ADMIN — METHOCARBAMOL 500 MG: 500 TABLET ORAL at 02:08

## 2021-08-25 RX ADMIN — TORSEMIDE 40 MG: 20 TABLET ORAL at 08:08

## 2021-08-25 RX ADMIN — MELATONIN TAB 3 MG 6 MG: 3 TAB at 08:08

## 2021-08-26 ENCOUNTER — LAB VISIT (OUTPATIENT)
Dept: LAB | Facility: OTHER | Age: 71
End: 2021-08-26
Payer: MEDICARE

## 2021-08-26 DIAGNOSIS — Z20.822 ENCOUNTER FOR LABORATORY TESTING FOR COVID-19 VIRUS: ICD-10-CM

## 2021-08-26 LAB
ALBUMIN SERPL BCP-MCNC: 2 G/DL (ref 3.5–5.2)
ALP SERPL-CCNC: 92 U/L (ref 55–135)
ALT SERPL W/O P-5'-P-CCNC: 10 U/L (ref 10–44)
ANION GAP SERPL CALC-SCNC: 9 MMOL/L (ref 8–16)
AST SERPL-CCNC: 16 U/L (ref 10–40)
BASOPHILS # BLD AUTO: 0.01 K/UL (ref 0–0.2)
BASOPHILS NFR BLD: 0.2 % (ref 0–1.9)
BILIRUB SERPL-MCNC: 0.3 MG/DL (ref 0.1–1)
BUN SERPL-MCNC: 14 MG/DL (ref 8–23)
CALCIUM SERPL-MCNC: 8.5 MG/DL (ref 8.7–10.5)
CHLORIDE SERPL-SCNC: 94 MMOL/L (ref 95–110)
CO2 SERPL-SCNC: 31 MMOL/L (ref 23–29)
CREAT SERPL-MCNC: 0.8 MG/DL (ref 0.5–1.4)
DIFFERENTIAL METHOD: ABNORMAL
EOSINOPHIL # BLD AUTO: 0.2 K/UL (ref 0–0.5)
EOSINOPHIL NFR BLD: 3.3 % (ref 0–8)
ERYTHROCYTE [DISTWIDTH] IN BLOOD BY AUTOMATED COUNT: 14.6 % (ref 11.5–14.5)
EST. GFR  (AFRICAN AMERICAN): >60 ML/MIN/1.73 M^2
EST. GFR  (NON AFRICAN AMERICAN): >60 ML/MIN/1.73 M^2
GLUCOSE SERPL-MCNC: 77 MG/DL (ref 70–110)
HCT VFR BLD AUTO: 23.9 % (ref 37–48.5)
HGB BLD-MCNC: 7.6 G/DL (ref 12–16)
IMM GRANULOCYTES # BLD AUTO: 0.03 K/UL (ref 0–0.04)
IMM GRANULOCYTES NFR BLD AUTO: 0.7 % (ref 0–0.5)
LYMPHOCYTES # BLD AUTO: 1.3 K/UL (ref 1–4.8)
LYMPHOCYTES NFR BLD: 27.2 % (ref 18–48)
MAGNESIUM SERPL-MCNC: 1.7 MG/DL (ref 1.6–2.6)
MCH RBC QN AUTO: 29.3 PG (ref 27–31)
MCHC RBC AUTO-ENTMCNC: 31.8 G/DL (ref 32–36)
MCV RBC AUTO: 92 FL (ref 82–98)
MONOCYTES # BLD AUTO: 1 K/UL (ref 0.3–1)
MONOCYTES NFR BLD: 21.1 % (ref 4–15)
NEUTROPHILS # BLD AUTO: 2.2 K/UL (ref 1.8–7.7)
NEUTROPHILS NFR BLD: 47.5 % (ref 38–73)
NRBC BLD-RTO: 0 /100 WBC
PHOSPHATE SERPL-MCNC: 4.9 MG/DL (ref 2.7–4.5)
PLATELET # BLD AUTO: 310 K/UL (ref 150–450)
PMV BLD AUTO: 9.7 FL (ref 9.2–12.9)
POTASSIUM SERPL-SCNC: 4.2 MMOL/L (ref 3.5–5.1)
PROT SERPL-MCNC: 5.4 G/DL (ref 6–8.4)
RBC # BLD AUTO: 2.59 M/UL (ref 4–5.4)
SODIUM SERPL-SCNC: 134 MMOL/L (ref 136–145)
WBC # BLD AUTO: 4.6 K/UL (ref 3.9–12.7)

## 2021-08-26 PROCEDURE — 36415 COLL VENOUS BLD VENIPUNCTURE: CPT | Performed by: PHYSICIAN ASSISTANT

## 2021-08-26 PROCEDURE — 25000003 PHARM REV CODE 250: Performed by: PHYSICIAN ASSISTANT

## 2021-08-26 PROCEDURE — 84100 ASSAY OF PHOSPHORUS: CPT | Performed by: PHYSICIAN ASSISTANT

## 2021-08-26 PROCEDURE — U0003 INFECTIOUS AGENT DETECTION BY NUCLEIC ACID (DNA OR RNA); SEVERE ACUTE RESPIRATORY SYNDROME CORONAVIRUS 2 (SARS-COV-2) (CORONAVIRUS DISEASE [COVID-19]), AMPLIFIED PROBE TECHNIQUE, MAKING USE OF HIGH THROUGHPUT TECHNOLOGIES AS DESCRIBED BY CMS-2020-01-R: HCPCS | Performed by: NURSE PRACTITIONER

## 2021-08-26 PROCEDURE — 80053 COMPREHEN METABOLIC PANEL: CPT | Performed by: NURSE PRACTITIONER

## 2021-08-26 PROCEDURE — 85025 COMPLETE CBC W/AUTO DIFF WBC: CPT | Performed by: PHYSICIAN ASSISTANT

## 2021-08-26 PROCEDURE — 11000004 HC SNF PRIVATE

## 2021-08-26 PROCEDURE — 97530 THERAPEUTIC ACTIVITIES: CPT | Mod: CO

## 2021-08-26 PROCEDURE — 25000003 PHARM REV CODE 250: Performed by: NURSE PRACTITIONER

## 2021-08-26 PROCEDURE — 83735 ASSAY OF MAGNESIUM: CPT | Performed by: PHYSICIAN ASSISTANT

## 2021-08-26 RX ORDER — LOSARTAN POTASSIUM 25 MG/1
25 TABLET ORAL DAILY
Status: DISCONTINUED | OUTPATIENT
Start: 2021-08-26 | End: 2021-09-07 | Stop reason: HOSPADM

## 2021-08-26 RX ORDER — TORSEMIDE 20 MG/1
20 TABLET ORAL DAILY
Status: DISCONTINUED | OUTPATIENT
Start: 2021-08-27 | End: 2021-09-07 | Stop reason: HOSPADM

## 2021-08-26 RX ADMIN — METHOCARBAMOL 500 MG: 500 TABLET ORAL at 08:08

## 2021-08-26 RX ADMIN — LOPERAMIDE HYDROCHLORIDE 2 MG: 2 CAPSULE ORAL at 08:08

## 2021-08-26 RX ADMIN — DICLOFENAC SODIUM 4 G: 10 GEL TOPICAL at 10:08

## 2021-08-26 RX ADMIN — FLUTICASONE FUROATE AND VILANTEROL TRIFENATATE 1 PUFF: 100; 25 POWDER RESPIRATORY (INHALATION) at 10:08

## 2021-08-26 RX ADMIN — METHOCARBAMOL 500 MG: 500 TABLET ORAL at 10:08

## 2021-08-26 RX ADMIN — LOPERAMIDE HYDROCHLORIDE 2 MG: 2 CAPSULE ORAL at 02:08

## 2021-08-26 RX ADMIN — CETIRIZINE HYDROCHLORIDE 10 MG: 5 TABLET ORAL at 08:08

## 2021-08-26 RX ADMIN — HYDROCODONE BITARTRATE AND ACETAMINOPHEN 1 TABLET: 10; 325 TABLET ORAL at 08:08

## 2021-08-26 RX ADMIN — HYDROCODONE BITARTRATE AND ACETAMINOPHEN 1 TABLET: 5; 325 TABLET ORAL at 02:08

## 2021-08-26 RX ADMIN — TIOTROPIUM BROMIDE INHALATION SPRAY 2 PUFF: 3.12 SPRAY, METERED RESPIRATORY (INHALATION) at 10:08

## 2021-08-26 RX ADMIN — Medication 800 MG: at 10:08

## 2021-08-26 RX ADMIN — Medication 800 MG: at 08:08

## 2021-08-26 RX ADMIN — Medication 250 MG: at 10:08

## 2021-08-26 RX ADMIN — TORSEMIDE 40 MG: 20 TABLET ORAL at 10:08

## 2021-08-26 RX ADMIN — DICLOFENAC SODIUM 4 G: 10 GEL TOPICAL at 08:08

## 2021-08-26 RX ADMIN — GUAIFENESIN AND DEXTROMETHORPHAN HYDROBROMIDE 1 TABLET: 600; 30 TABLET, EXTENDED RELEASE ORAL at 10:08

## 2021-08-26 RX ADMIN — DICLOFENAC SODIUM 4 G: 10 GEL TOPICAL at 02:08

## 2021-08-26 RX ADMIN — FERROUS SULFATE TAB EC 325 MG (65 MG FE EQUIVALENT) 1 EACH: 325 (65 FE) TABLET DELAYED RESPONSE at 09:08

## 2021-08-26 RX ADMIN — METOPROLOL SUCCINATE 50 MG: 50 TABLET, EXTENDED RELEASE ORAL at 10:08

## 2021-08-26 RX ADMIN — MELATONIN TAB 3 MG 6 MG: 3 TAB at 08:08

## 2021-08-26 RX ADMIN — CALCIUM CARBONATE (ANTACID) CHEW TAB 500 MG 1500 MG: 500 CHEW TAB at 10:08

## 2021-08-26 RX ADMIN — GUAIFENESIN AND DEXTROMETHORPHAN HYDROBROMIDE 1 TABLET: 600; 30 TABLET, EXTENDED RELEASE ORAL at 08:08

## 2021-08-26 RX ADMIN — METHOCARBAMOL 500 MG: 500 TABLET ORAL at 02:08

## 2021-08-26 RX ADMIN — CALCIUM CARBONATE (ANTACID) CHEW TAB 500 MG 1500 MG: 500 CHEW TAB at 08:08

## 2021-08-26 RX ADMIN — LIDOCAINE 1 PATCH: 50 PATCH TOPICAL at 02:08

## 2021-08-26 RX ADMIN — Medication 1 CAPSULE: at 10:08

## 2021-08-26 RX ADMIN — ATORVASTATIN CALCIUM 40 MG: 20 TABLET, FILM COATED ORAL at 10:08

## 2021-08-26 RX ADMIN — LOSARTAN POTASSIUM 25 MG: 25 TABLET, FILM COATED ORAL at 02:08

## 2021-08-27 LAB
SARS-COV-2 RNA RESP QL NAA+PROBE: NOT DETECTED
SARS-COV-2- CYCLE NUMBER: NORMAL

## 2021-08-27 PROCEDURE — 97110 THERAPEUTIC EXERCISES: CPT | Mod: CQ

## 2021-08-27 PROCEDURE — 25000003 PHARM REV CODE 250: Performed by: PHYSICIAN ASSISTANT

## 2021-08-27 PROCEDURE — 97530 THERAPEUTIC ACTIVITIES: CPT | Mod: CQ

## 2021-08-27 PROCEDURE — 25000003 PHARM REV CODE 250: Performed by: NURSE PRACTITIONER

## 2021-08-27 PROCEDURE — 11000004 HC SNF PRIVATE

## 2021-08-27 PROCEDURE — 97535 SELF CARE MNGMENT TRAINING: CPT

## 2021-08-27 PROCEDURE — 97116 GAIT TRAINING THERAPY: CPT | Mod: CQ

## 2021-08-27 RX ORDER — TALC
6 POWDER (GRAM) TOPICAL NIGHTLY PRN
Refills: 0 | COMMUNITY
Start: 2021-08-27 | End: 2021-09-07 | Stop reason: HOSPADM

## 2021-08-27 RX ORDER — ACETAMINOPHEN 500 MG
1000 TABLET ORAL EVERY 8 HOURS PRN
Refills: 0 | Status: ON HOLD | COMMUNITY
Start: 2021-08-27 | End: 2022-06-08 | Stop reason: HOSPADM

## 2021-08-27 RX ORDER — GABAPENTIN 300 MG/1
300 CAPSULE ORAL 3 TIMES DAILY
Qty: 90 CAPSULE | Refills: 11 | Status: SHIPPED | OUTPATIENT
Start: 2021-08-27 | End: 2021-09-07

## 2021-08-27 RX ORDER — FERROUS SULFATE 325(65) MG
1 TABLET ORAL DAILY
Qty: 30 TABLET | Refills: 2 | Status: SHIPPED | OUTPATIENT
Start: 2021-08-27 | End: 2021-09-07

## 2021-08-27 RX ORDER — CETIRIZINE HYDROCHLORIDE 10 MG/1
10 TABLET ORAL NIGHTLY
Qty: 30 TABLET | Refills: 3 | Status: SHIPPED | OUTPATIENT
Start: 2021-08-27 | End: 2021-09-07 | Stop reason: HOSPADM

## 2021-08-27 RX ORDER — ASCORBIC ACID 250 MG
250 TABLET ORAL DAILY
COMMUNITY
Start: 2021-08-27 | End: 2021-09-07 | Stop reason: HOSPADM

## 2021-08-27 RX ORDER — METHOCARBAMOL 500 MG/1
500 TABLET, FILM COATED ORAL 3 TIMES DAILY
Qty: 30 TABLET | Refills: 0 | Status: SHIPPED | OUTPATIENT
Start: 2021-08-27 | End: 2021-09-07

## 2021-08-27 RX ORDER — HYDROCODONE BITARTRATE AND ACETAMINOPHEN 5; 325 MG/1; MG/1
1 TABLET ORAL EVERY 6 HOURS PRN
Qty: 30 TABLET | Refills: 0 | Status: SHIPPED | OUTPATIENT
Start: 2021-08-27 | End: 2021-09-07

## 2021-08-27 RX ADMIN — CALCIUM CARBONATE (ANTACID) CHEW TAB 500 MG 1500 MG: 500 CHEW TAB at 08:08

## 2021-08-27 RX ADMIN — GUAIFENESIN AND DEXTROMETHORPHAN HYDROBROMIDE 1 TABLET: 600; 30 TABLET, EXTENDED RELEASE ORAL at 09:08

## 2021-08-27 RX ADMIN — LOSARTAN POTASSIUM 25 MG: 25 TABLET, FILM COATED ORAL at 08:08

## 2021-08-27 RX ADMIN — METHOCARBAMOL 500 MG: 500 TABLET ORAL at 08:08

## 2021-08-27 RX ADMIN — ATORVASTATIN CALCIUM 40 MG: 20 TABLET, FILM COATED ORAL at 08:08

## 2021-08-27 RX ADMIN — CETIRIZINE HYDROCHLORIDE 10 MG: 5 TABLET ORAL at 09:08

## 2021-08-27 RX ADMIN — METHOCARBAMOL 500 MG: 500 TABLET ORAL at 02:08

## 2021-08-27 RX ADMIN — HYDROCODONE BITARTRATE AND ACETAMINOPHEN 1 TABLET: 10; 325 TABLET ORAL at 08:08

## 2021-08-27 RX ADMIN — Medication 1 CAPSULE: at 08:08

## 2021-08-27 RX ADMIN — LIDOCAINE 1 PATCH: 50 PATCH TOPICAL at 02:08

## 2021-08-27 RX ADMIN — Medication 800 MG: at 09:08

## 2021-08-27 RX ADMIN — HYDROCODONE BITARTRATE AND ACETAMINOPHEN 1 TABLET: 10; 325 TABLET ORAL at 02:08

## 2021-08-27 RX ADMIN — CALCIUM CARBONATE (ANTACID) CHEW TAB 500 MG 1500 MG: 500 CHEW TAB at 09:08

## 2021-08-27 RX ADMIN — GUAIFENESIN AND DEXTROMETHORPHAN HYDROBROMIDE 1 TABLET: 600; 30 TABLET, EXTENDED RELEASE ORAL at 08:08

## 2021-08-27 RX ADMIN — METOPROLOL SUCCINATE 50 MG: 50 TABLET, EXTENDED RELEASE ORAL at 08:08

## 2021-08-27 RX ADMIN — Medication 250 MG: at 08:08

## 2021-08-27 RX ADMIN — FLUTICASONE FUROATE AND VILANTEROL TRIFENATATE 1 PUFF: 100; 25 POWDER RESPIRATORY (INHALATION) at 08:08

## 2021-08-27 RX ADMIN — DICLOFENAC SODIUM 4 G: 10 GEL TOPICAL at 09:08

## 2021-08-27 RX ADMIN — HYDROCODONE BITARTRATE AND ACETAMINOPHEN 1 TABLET: 10; 325 TABLET ORAL at 09:08

## 2021-08-27 RX ADMIN — DICLOFENAC SODIUM 4 G: 10 GEL TOPICAL at 08:08

## 2021-08-27 RX ADMIN — FERROUS SULFATE TAB EC 325 MG (65 MG FE EQUIVALENT) 325 MG: 325 (65 FE) TABLET DELAYED RESPONSE at 08:08

## 2021-08-27 RX ADMIN — TORSEMIDE 20 MG: 20 TABLET ORAL at 08:08

## 2021-08-27 RX ADMIN — DICLOFENAC SODIUM 4 G: 10 GEL TOPICAL at 02:08

## 2021-08-27 RX ADMIN — METHOCARBAMOL 500 MG: 500 TABLET ORAL at 09:08

## 2021-08-27 RX ADMIN — TIOTROPIUM BROMIDE INHALATION SPRAY 2 PUFF: 3.12 SPRAY, METERED RESPIRATORY (INHALATION) at 08:08

## 2021-08-27 RX ADMIN — MELATONIN TAB 3 MG 6 MG: 3 TAB at 09:08

## 2021-08-27 RX ADMIN — Medication 800 MG: at 08:08

## 2021-08-28 PROCEDURE — 27000221 HC OXYGEN, UP TO 24 HOURS

## 2021-08-28 PROCEDURE — 94761 N-INVAS EAR/PLS OXIMETRY MLT: CPT

## 2021-08-28 PROCEDURE — 25000003 PHARM REV CODE 250: Performed by: NURSE PRACTITIONER

## 2021-08-28 PROCEDURE — 97535 SELF CARE MNGMENT TRAINING: CPT | Mod: CO

## 2021-08-28 PROCEDURE — 99900035 HC TECH TIME PER 15 MIN (STAT)

## 2021-08-28 PROCEDURE — 11000004 HC SNF PRIVATE

## 2021-08-28 PROCEDURE — 25000003 PHARM REV CODE 250: Performed by: PHYSICIAN ASSISTANT

## 2021-08-28 RX ADMIN — METHOCARBAMOL 500 MG: 500 TABLET ORAL at 03:08

## 2021-08-28 RX ADMIN — TORSEMIDE 20 MG: 20 TABLET ORAL at 09:08

## 2021-08-28 RX ADMIN — LOSARTAN POTASSIUM 25 MG: 25 TABLET, FILM COATED ORAL at 09:08

## 2021-08-28 RX ADMIN — Medication 800 MG: at 09:08

## 2021-08-28 RX ADMIN — GUAIFENESIN AND DEXTROMETHORPHAN HYDROBROMIDE 1 TABLET: 600; 30 TABLET, EXTENDED RELEASE ORAL at 09:08

## 2021-08-28 RX ADMIN — Medication 250 MG: at 09:08

## 2021-08-28 RX ADMIN — CALCIUM CARBONATE (ANTACID) CHEW TAB 500 MG 1500 MG: 500 CHEW TAB at 09:08

## 2021-08-28 RX ADMIN — CETIRIZINE HYDROCHLORIDE 10 MG: 5 TABLET ORAL at 09:08

## 2021-08-28 RX ADMIN — DICLOFENAC SODIUM 4 G: 10 GEL TOPICAL at 09:08

## 2021-08-28 RX ADMIN — TIOTROPIUM BROMIDE INHALATION SPRAY 2 PUFF: 3.12 SPRAY, METERED RESPIRATORY (INHALATION) at 09:08

## 2021-08-28 RX ADMIN — GABAPENTIN 300 MG: 300 CAPSULE ORAL at 09:08

## 2021-08-28 RX ADMIN — HYDROCODONE BITARTRATE AND ACETAMINOPHEN 1 TABLET: 10; 325 TABLET ORAL at 09:08

## 2021-08-28 RX ADMIN — FLUTICASONE FUROATE AND VILANTEROL TRIFENATATE 1 PUFF: 100; 25 POWDER RESPIRATORY (INHALATION) at 09:08

## 2021-08-28 RX ADMIN — DICLOFENAC SODIUM 4 G: 10 GEL TOPICAL at 03:08

## 2021-08-28 RX ADMIN — Medication 1 CAPSULE: at 09:08

## 2021-08-28 RX ADMIN — FERROUS SULFATE TAB EC 325 MG (65 MG FE EQUIVALENT) 1 MG: 325 (65 FE) TABLET DELAYED RESPONSE at 09:08

## 2021-08-28 RX ADMIN — METHOCARBAMOL 500 MG: 500 TABLET ORAL at 09:08

## 2021-08-28 RX ADMIN — METOPROLOL SUCCINATE 50 MG: 50 TABLET, EXTENDED RELEASE ORAL at 09:08

## 2021-08-28 RX ADMIN — ATORVASTATIN CALCIUM 40 MG: 20 TABLET, FILM COATED ORAL at 09:08

## 2021-08-28 RX ADMIN — LIDOCAINE 1 PATCH: 50 PATCH TOPICAL at 03:08

## 2021-08-28 RX ADMIN — MELATONIN TAB 3 MG 6 MG: 3 TAB at 09:08

## 2021-08-29 PROCEDURE — 25000003 PHARM REV CODE 250: Performed by: PHYSICIAN ASSISTANT

## 2021-08-29 PROCEDURE — 27000221 HC OXYGEN, UP TO 24 HOURS

## 2021-08-29 PROCEDURE — 97530 THERAPEUTIC ACTIVITIES: CPT | Mod: CQ

## 2021-08-29 PROCEDURE — 11000004 HC SNF PRIVATE

## 2021-08-29 PROCEDURE — 97110 THERAPEUTIC EXERCISES: CPT | Mod: CO

## 2021-08-29 PROCEDURE — 94761 N-INVAS EAR/PLS OXIMETRY MLT: CPT

## 2021-08-29 PROCEDURE — 25000003 PHARM REV CODE 250: Performed by: NURSE PRACTITIONER

## 2021-08-29 PROCEDURE — 97116 GAIT TRAINING THERAPY: CPT | Mod: CQ

## 2021-08-29 PROCEDURE — 99900035 HC TECH TIME PER 15 MIN (STAT)

## 2021-08-29 PROCEDURE — 97535 SELF CARE MNGMENT TRAINING: CPT | Mod: CO

## 2021-08-29 PROCEDURE — 97110 THERAPEUTIC EXERCISES: CPT | Mod: CQ

## 2021-08-29 PROCEDURE — 25000242 PHARM REV CODE 250 ALT 637 W/ HCPCS: Performed by: PHYSICIAN ASSISTANT

## 2021-08-29 RX ADMIN — LOSARTAN POTASSIUM 25 MG: 25 TABLET, FILM COATED ORAL at 09:08

## 2021-08-29 RX ADMIN — CALCIUM CARBONATE (ANTACID) CHEW TAB 500 MG 1500 MG: 500 CHEW TAB at 09:08

## 2021-08-29 RX ADMIN — DICLOFENAC SODIUM 4 G: 10 GEL TOPICAL at 02:08

## 2021-08-29 RX ADMIN — TORSEMIDE 20 MG: 20 TABLET ORAL at 09:08

## 2021-08-29 RX ADMIN — Medication 800 MG: at 09:08

## 2021-08-29 RX ADMIN — METHOCARBAMOL 500 MG: 500 TABLET ORAL at 02:08

## 2021-08-29 RX ADMIN — FERROUS SULFATE TAB EC 325 MG (65 MG FE EQUIVALENT) 1 MG: 325 (65 FE) TABLET DELAYED RESPONSE at 09:08

## 2021-08-29 RX ADMIN — GUAIFENESIN AND DEXTROMETHORPHAN HYDROBROMIDE 1 TABLET: 600; 30 TABLET, EXTENDED RELEASE ORAL at 09:08

## 2021-08-29 RX ADMIN — Medication 250 MG: at 09:08

## 2021-08-29 RX ADMIN — MELATONIN TAB 3 MG 6 MG: 3 TAB at 09:08

## 2021-08-29 RX ADMIN — GABAPENTIN 300 MG: 300 CAPSULE ORAL at 09:08

## 2021-08-29 RX ADMIN — METHOCARBAMOL 500 MG: 500 TABLET ORAL at 09:08

## 2021-08-29 RX ADMIN — METOPROLOL SUCCINATE 50 MG: 50 TABLET, EXTENDED RELEASE ORAL at 09:08

## 2021-08-29 RX ADMIN — ATORVASTATIN CALCIUM 40 MG: 20 TABLET, FILM COATED ORAL at 09:08

## 2021-08-29 RX ADMIN — Medication 1 CAPSULE: at 09:08

## 2021-08-29 RX ADMIN — DICLOFENAC SODIUM 4 G: 10 GEL TOPICAL at 09:08

## 2021-08-29 RX ADMIN — HYDROCODONE BITARTRATE AND ACETAMINOPHEN 1 TABLET: 5; 325 TABLET ORAL at 03:08

## 2021-08-29 RX ADMIN — HYDROCODONE BITARTRATE AND ACETAMINOPHEN 1 TABLET: 10; 325 TABLET ORAL at 05:08

## 2021-08-29 RX ADMIN — LIDOCAINE 1 PATCH: 50 PATCH TOPICAL at 02:08

## 2021-08-29 RX ADMIN — GABAPENTIN 300 MG: 300 CAPSULE ORAL at 02:08

## 2021-08-29 RX ADMIN — HYDROCODONE BITARTRATE AND ACETAMINOPHEN 1 TABLET: 10; 325 TABLET ORAL at 10:08

## 2021-08-29 RX ADMIN — CETIRIZINE HYDROCHLORIDE 10 MG: 5 TABLET ORAL at 09:08

## 2021-08-29 RX ADMIN — FLUTICASONE FUROATE AND VILANTEROL TRIFENATATE 1 PUFF: 100; 25 POWDER RESPIRATORY (INHALATION) at 09:08

## 2021-08-30 PROCEDURE — 94761 N-INVAS EAR/PLS OXIMETRY MLT: CPT

## 2021-08-30 PROCEDURE — 25000003 PHARM REV CODE 250: Performed by: NURSE PRACTITIONER

## 2021-08-30 PROCEDURE — 11000004 HC SNF PRIVATE

## 2021-08-30 PROCEDURE — 25000003 PHARM REV CODE 250: Performed by: PHYSICIAN ASSISTANT

## 2021-08-30 PROCEDURE — 27000221 HC OXYGEN, UP TO 24 HOURS

## 2021-08-30 PROCEDURE — 99900035 HC TECH TIME PER 15 MIN (STAT)

## 2021-08-30 RX ADMIN — METOPROLOL SUCCINATE 50 MG: 50 TABLET, EXTENDED RELEASE ORAL at 08:08

## 2021-08-30 RX ADMIN — HYDROCODONE BITARTRATE AND ACETAMINOPHEN 1 TABLET: 10; 325 TABLET ORAL at 10:08

## 2021-08-30 RX ADMIN — LIDOCAINE 1 PATCH: 50 PATCH TOPICAL at 03:08

## 2021-08-30 RX ADMIN — DICLOFENAC SODIUM 4 G: 10 GEL TOPICAL at 03:08

## 2021-08-30 RX ADMIN — GUAIFENESIN AND DEXTROMETHORPHAN HYDROBROMIDE 1 TABLET: 600; 30 TABLET, EXTENDED RELEASE ORAL at 10:08

## 2021-08-30 RX ADMIN — DICLOFENAC SODIUM 4 G: 10 GEL TOPICAL at 09:08

## 2021-08-30 RX ADMIN — CALCIUM CARBONATE (ANTACID) CHEW TAB 500 MG 1500 MG: 500 CHEW TAB at 08:08

## 2021-08-30 RX ADMIN — LOPERAMIDE HYDROCHLORIDE 2 MG: 2 CAPSULE ORAL at 11:08

## 2021-08-30 RX ADMIN — ATORVASTATIN CALCIUM 40 MG: 20 TABLET, FILM COATED ORAL at 08:08

## 2021-08-30 RX ADMIN — METHOCARBAMOL 500 MG: 500 TABLET ORAL at 03:08

## 2021-08-30 RX ADMIN — Medication 800 MG: at 09:08

## 2021-08-30 RX ADMIN — LOSARTAN POTASSIUM 25 MG: 25 TABLET, FILM COATED ORAL at 09:08

## 2021-08-30 RX ADMIN — Medication 800 MG: at 08:08

## 2021-08-30 RX ADMIN — CETIRIZINE HYDROCHLORIDE 10 MG: 5 TABLET ORAL at 10:08

## 2021-08-30 RX ADMIN — MELATONIN TAB 3 MG 6 MG: 3 TAB at 09:08

## 2021-08-30 RX ADMIN — METHOCARBAMOL 500 MG: 500 TABLET ORAL at 09:08

## 2021-08-30 RX ADMIN — DICLOFENAC SODIUM 4 G: 10 GEL TOPICAL at 08:08

## 2021-08-30 RX ADMIN — GUAIFENESIN AND DEXTROMETHORPHAN HYDROBROMIDE 1 TABLET: 600; 30 TABLET, EXTENDED RELEASE ORAL at 08:08

## 2021-08-30 RX ADMIN — Medication 1 CAPSULE: at 08:08

## 2021-08-30 RX ADMIN — GABAPENTIN 300 MG: 300 CAPSULE ORAL at 10:08

## 2021-08-30 RX ADMIN — LOPERAMIDE HYDROCHLORIDE 2 MG: 2 CAPSULE ORAL at 10:08

## 2021-08-30 RX ADMIN — FERROUS SULFATE TAB EC 325 MG (65 MG FE EQUIVALENT) 1 MG: 325 (65 FE) TABLET DELAYED RESPONSE at 08:08

## 2021-08-30 RX ADMIN — FLUTICASONE FUROATE AND VILANTEROL TRIFENATATE 1 PUFF: 100; 25 POWDER RESPIRATORY (INHALATION) at 08:08

## 2021-08-30 RX ADMIN — TORSEMIDE 20 MG: 20 TABLET ORAL at 08:08

## 2021-08-30 RX ADMIN — Medication 250 MG: at 09:08

## 2021-08-30 RX ADMIN — GABAPENTIN 300 MG: 300 CAPSULE ORAL at 08:08

## 2021-08-30 RX ADMIN — GABAPENTIN 300 MG: 300 CAPSULE ORAL at 03:08

## 2021-08-30 RX ADMIN — CALCIUM CARBONATE (ANTACID) CHEW TAB 500 MG 1500 MG: 500 CHEW TAB at 09:08

## 2021-08-30 RX ADMIN — METHOCARBAMOL 500 MG: 500 TABLET ORAL at 08:08

## 2021-08-31 PROCEDURE — 25000003 PHARM REV CODE 250: Performed by: NURSE PRACTITIONER

## 2021-08-31 PROCEDURE — 25000003 PHARM REV CODE 250: Performed by: PHYSICIAN ASSISTANT

## 2021-08-31 PROCEDURE — 11000004 HC SNF PRIVATE

## 2021-08-31 RX ORDER — HYDROXYZINE HYDROCHLORIDE 25 MG/1
25 TABLET, FILM COATED ORAL 3 TIMES DAILY PRN
Status: DISCONTINUED | OUTPATIENT
Start: 2021-08-31 | End: 2021-09-07 | Stop reason: HOSPADM

## 2021-08-31 RX ADMIN — LOSARTAN POTASSIUM 25 MG: 25 TABLET, FILM COATED ORAL at 08:08

## 2021-08-31 RX ADMIN — Medication 250 MG: at 08:08

## 2021-08-31 RX ADMIN — ATORVASTATIN CALCIUM 40 MG: 20 TABLET, FILM COATED ORAL at 08:08

## 2021-08-31 RX ADMIN — METOPROLOL SUCCINATE 50 MG: 50 TABLET, EXTENDED RELEASE ORAL at 08:08

## 2021-08-31 RX ADMIN — MELATONIN TAB 3 MG 6 MG: 3 TAB at 09:08

## 2021-08-31 RX ADMIN — LOPERAMIDE HYDROCHLORIDE 2 MG: 2 CAPSULE ORAL at 09:08

## 2021-08-31 RX ADMIN — METHOCARBAMOL 500 MG: 500 TABLET ORAL at 03:08

## 2021-08-31 RX ADMIN — GABAPENTIN 300 MG: 300 CAPSULE ORAL at 08:08

## 2021-08-31 RX ADMIN — DICLOFENAC SODIUM 4 G: 10 GEL TOPICAL at 09:08

## 2021-08-31 RX ADMIN — GUAIFENESIN AND DEXTROMETHORPHAN HYDROBROMIDE 1 TABLET: 600; 30 TABLET, EXTENDED RELEASE ORAL at 08:08

## 2021-08-31 RX ADMIN — LIDOCAINE 1 PATCH: 50 PATCH TOPICAL at 02:08

## 2021-08-31 RX ADMIN — FERROUS SULFATE TAB EC 325 MG (65 MG FE EQUIVALENT) 1 MG: 325 (65 FE) TABLET DELAYED RESPONSE at 08:08

## 2021-08-31 RX ADMIN — CALCIUM CARBONATE (ANTACID) CHEW TAB 500 MG 1500 MG: 500 CHEW TAB at 09:08

## 2021-08-31 RX ADMIN — CALCIUM CARBONATE (ANTACID) CHEW TAB 500 MG 1500 MG: 500 CHEW TAB at 08:08

## 2021-08-31 RX ADMIN — FLUTICASONE FUROATE AND VILANTEROL TRIFENATATE 1 PUFF: 100; 25 POWDER RESPIRATORY (INHALATION) at 08:08

## 2021-08-31 RX ADMIN — HYDROCODONE BITARTRATE AND ACETAMINOPHEN 1 TABLET: 10; 325 TABLET ORAL at 08:08

## 2021-08-31 RX ADMIN — TORSEMIDE 20 MG: 20 TABLET ORAL at 08:08

## 2021-08-31 RX ADMIN — METHOCARBAMOL 500 MG: 500 TABLET ORAL at 08:08

## 2021-08-31 RX ADMIN — Medication 800 MG: at 08:08

## 2021-08-31 RX ADMIN — CETIRIZINE HYDROCHLORIDE 10 MG: 5 TABLET ORAL at 08:08

## 2021-08-31 RX ADMIN — Medication 800 MG: at 09:08

## 2021-08-31 RX ADMIN — DICLOFENAC SODIUM 4 G: 10 GEL TOPICAL at 02:08

## 2021-08-31 RX ADMIN — DICLOFENAC SODIUM 4 G: 10 GEL TOPICAL at 08:08

## 2021-08-31 RX ADMIN — HYDROCODONE BITARTRATE AND ACETAMINOPHEN 1 TABLET: 10; 325 TABLET ORAL at 01:08

## 2021-09-01 PROCEDURE — 99900035 HC TECH TIME PER 15 MIN (STAT)

## 2021-09-01 PROCEDURE — 11000004 HC SNF PRIVATE

## 2021-09-01 PROCEDURE — 25000003 PHARM REV CODE 250: Performed by: NURSE PRACTITIONER

## 2021-09-01 PROCEDURE — 94761 N-INVAS EAR/PLS OXIMETRY MLT: CPT

## 2021-09-01 PROCEDURE — 97116 GAIT TRAINING THERAPY: CPT

## 2021-09-01 PROCEDURE — 97110 THERAPEUTIC EXERCISES: CPT

## 2021-09-01 PROCEDURE — 25000242 PHARM REV CODE 250 ALT 637 W/ HCPCS: Performed by: PHYSICIAN ASSISTANT

## 2021-09-01 PROCEDURE — 27000221 HC OXYGEN, UP TO 24 HOURS

## 2021-09-01 PROCEDURE — 25000003 PHARM REV CODE 250: Performed by: PHYSICIAN ASSISTANT

## 2021-09-01 RX ORDER — LANOLIN ALCOHOL/MO/W.PET/CERES
400 CREAM (GRAM) TOPICAL DAILY
Status: DISCONTINUED | OUTPATIENT
Start: 2021-09-02 | End: 2021-09-07 | Stop reason: HOSPADM

## 2021-09-01 RX ADMIN — MELATONIN TAB 3 MG 6 MG: 3 TAB at 09:09

## 2021-09-01 RX ADMIN — ATORVASTATIN CALCIUM 40 MG: 20 TABLET, FILM COATED ORAL at 08:09

## 2021-09-01 RX ADMIN — METHOCARBAMOL 500 MG: 500 TABLET ORAL at 08:09

## 2021-09-01 RX ADMIN — LOSARTAN POTASSIUM 25 MG: 25 TABLET, FILM COATED ORAL at 08:09

## 2021-09-01 RX ADMIN — LIDOCAINE 1 PATCH: 50 PATCH TOPICAL at 03:09

## 2021-09-01 RX ADMIN — Medication 800 MG: at 09:09

## 2021-09-01 RX ADMIN — FLUTICASONE FUROATE AND VILANTEROL TRIFENATATE 1 PUFF: 100; 25 POWDER RESPIRATORY (INHALATION) at 08:09

## 2021-09-01 RX ADMIN — GABAPENTIN 300 MG: 300 CAPSULE ORAL at 03:09

## 2021-09-01 RX ADMIN — DICLOFENAC SODIUM 4 G: 10 GEL TOPICAL at 08:09

## 2021-09-01 RX ADMIN — METOPROLOL SUCCINATE 50 MG: 50 TABLET, EXTENDED RELEASE ORAL at 09:09

## 2021-09-01 RX ADMIN — METHOCARBAMOL 500 MG: 500 TABLET ORAL at 03:09

## 2021-09-01 RX ADMIN — TORSEMIDE 20 MG: 20 TABLET ORAL at 08:09

## 2021-09-01 RX ADMIN — FERROUS SULFATE TAB EC 325 MG (65 MG FE EQUIVALENT) 1 EACH: 325 (65 FE) TABLET DELAYED RESPONSE at 08:09

## 2021-09-01 RX ADMIN — GUAIFENESIN AND DEXTROMETHORPHAN HYDROBROMIDE 1 TABLET: 600; 30 TABLET, EXTENDED RELEASE ORAL at 09:09

## 2021-09-01 RX ADMIN — GABAPENTIN 300 MG: 300 CAPSULE ORAL at 09:09

## 2021-09-01 RX ADMIN — CETIRIZINE HYDROCHLORIDE 10 MG: 5 TABLET ORAL at 09:09

## 2021-09-01 RX ADMIN — CALCIUM CARBONATE (ANTACID) CHEW TAB 500 MG 1500 MG: 500 CHEW TAB at 08:09

## 2021-09-01 RX ADMIN — DICLOFENAC SODIUM 4 G: 10 GEL TOPICAL at 09:09

## 2021-09-01 RX ADMIN — CALCIUM CARBONATE (ANTACID) CHEW TAB 500 MG 1500 MG: 500 CHEW TAB at 09:09

## 2021-09-01 RX ADMIN — GUAIFENESIN AND DEXTROMETHORPHAN HYDROBROMIDE 1 TABLET: 600; 30 TABLET, EXTENDED RELEASE ORAL at 08:09

## 2021-09-01 RX ADMIN — GABAPENTIN 300 MG: 300 CAPSULE ORAL at 08:09

## 2021-09-01 RX ADMIN — HYDROCODONE BITARTRATE AND ACETAMINOPHEN 1 TABLET: 10; 325 TABLET ORAL at 05:09

## 2021-09-01 RX ADMIN — DICLOFENAC SODIUM 4 G: 10 GEL TOPICAL at 03:09

## 2021-09-01 RX ADMIN — Medication 250 MG: at 08:09

## 2021-09-01 RX ADMIN — TIOTROPIUM BROMIDE INHALATION SPRAY 2 PUFF: 3.12 SPRAY, METERED RESPIRATORY (INHALATION) at 08:09

## 2021-09-01 RX ADMIN — METHOCARBAMOL 500 MG: 500 TABLET ORAL at 09:09

## 2021-09-02 LAB
ALBUMIN SERPL BCP-MCNC: 2.2 G/DL (ref 3.5–5.2)
ALP SERPL-CCNC: 75 U/L (ref 55–135)
ALT SERPL W/O P-5'-P-CCNC: 9 U/L (ref 10–44)
ANION GAP SERPL CALC-SCNC: 9 MMOL/L (ref 8–16)
ANISOCYTOSIS BLD QL SMEAR: SLIGHT
AST SERPL-CCNC: 16 U/L (ref 10–40)
BASOPHILS # BLD AUTO: 0.02 K/UL (ref 0–0.2)
BASOPHILS NFR BLD: 0.4 % (ref 0–1.9)
BILIRUB SERPL-MCNC: 0.3 MG/DL (ref 0.1–1)
BUN SERPL-MCNC: 17 MG/DL (ref 8–23)
CALCIUM SERPL-MCNC: 8.5 MG/DL (ref 8.7–10.5)
CHLORIDE SERPL-SCNC: 96 MMOL/L (ref 95–110)
CO2 SERPL-SCNC: 26 MMOL/L (ref 23–29)
CREAT SERPL-MCNC: 0.8 MG/DL (ref 0.5–1.4)
DIFFERENTIAL METHOD: ABNORMAL
EOSINOPHIL # BLD AUTO: 0.1 K/UL (ref 0–0.5)
EOSINOPHIL NFR BLD: 2.6 % (ref 0–8)
ERYTHROCYTE [DISTWIDTH] IN BLOOD BY AUTOMATED COUNT: 15.2 % (ref 11.5–14.5)
EST. GFR  (AFRICAN AMERICAN): >60 ML/MIN/1.73 M^2
EST. GFR  (NON AFRICAN AMERICAN): >60 ML/MIN/1.73 M^2
GLUCOSE SERPL-MCNC: 73 MG/DL (ref 70–110)
HCT VFR BLD AUTO: 23.1 % (ref 37–48.5)
HGB BLD-MCNC: 7.3 G/DL (ref 12–16)
HYPOCHROMIA BLD QL SMEAR: ABNORMAL
IMM GRANULOCYTES # BLD AUTO: 0.04 K/UL (ref 0–0.04)
IMM GRANULOCYTES NFR BLD AUTO: 0.8 % (ref 0–0.5)
LYMPHOCYTES # BLD AUTO: 1.9 K/UL (ref 1–4.8)
LYMPHOCYTES NFR BLD: 37.5 % (ref 18–48)
MAGNESIUM SERPL-MCNC: 1.5 MG/DL (ref 1.6–2.6)
MCH RBC QN AUTO: 29.6 PG (ref 27–31)
MCHC RBC AUTO-ENTMCNC: 31.6 G/DL (ref 32–36)
MCV RBC AUTO: 94 FL (ref 82–98)
MONOCYTES # BLD AUTO: 0.7 K/UL (ref 0.3–1)
MONOCYTES NFR BLD: 14.5 % (ref 4–15)
NEUTROPHILS # BLD AUTO: 2.2 K/UL (ref 1.8–7.7)
NEUTROPHILS NFR BLD: 44.2 % (ref 38–73)
NRBC BLD-RTO: 0 /100 WBC
OVALOCYTES BLD QL SMEAR: ABNORMAL
PHOSPHATE SERPL-MCNC: 4.7 MG/DL (ref 2.7–4.5)
PLATELET # BLD AUTO: 348 K/UL (ref 150–450)
PLATELET BLD QL SMEAR: ABNORMAL
PMV BLD AUTO: 10.1 FL (ref 9.2–12.9)
POIKILOCYTOSIS BLD QL SMEAR: SLIGHT
POTASSIUM SERPL-SCNC: 3.8 MMOL/L (ref 3.5–5.1)
PROT SERPL-MCNC: 5.2 G/DL (ref 6–8.4)
RBC # BLD AUTO: 2.47 M/UL (ref 4–5.4)
SODIUM SERPL-SCNC: 131 MMOL/L (ref 136–145)
WBC # BLD AUTO: 5.02 K/UL (ref 3.9–12.7)

## 2021-09-02 PROCEDURE — 97530 THERAPEUTIC ACTIVITIES: CPT | Mod: CQ

## 2021-09-02 PROCEDURE — 97530 THERAPEUTIC ACTIVITIES: CPT | Mod: CO

## 2021-09-02 PROCEDURE — 25000003 PHARM REV CODE 250: Performed by: NURSE PRACTITIONER

## 2021-09-02 PROCEDURE — 27000221 HC OXYGEN, UP TO 24 HOURS

## 2021-09-02 PROCEDURE — 97116 GAIT TRAINING THERAPY: CPT | Mod: CQ

## 2021-09-02 PROCEDURE — 99900035 HC TECH TIME PER 15 MIN (STAT)

## 2021-09-02 PROCEDURE — 84100 ASSAY OF PHOSPHORUS: CPT | Performed by: PHYSICIAN ASSISTANT

## 2021-09-02 PROCEDURE — 80053 COMPREHEN METABOLIC PANEL: CPT | Performed by: NURSE PRACTITIONER

## 2021-09-02 PROCEDURE — 97110 THERAPEUTIC EXERCISES: CPT | Mod: CQ

## 2021-09-02 PROCEDURE — 85025 COMPLETE CBC W/AUTO DIFF WBC: CPT | Performed by: PHYSICIAN ASSISTANT

## 2021-09-02 PROCEDURE — 25000003 PHARM REV CODE 250: Performed by: PHYSICIAN ASSISTANT

## 2021-09-02 PROCEDURE — 83735 ASSAY OF MAGNESIUM: CPT | Performed by: PHYSICIAN ASSISTANT

## 2021-09-02 PROCEDURE — 94761 N-INVAS EAR/PLS OXIMETRY MLT: CPT

## 2021-09-02 PROCEDURE — 11000004 HC SNF PRIVATE

## 2021-09-02 RX ORDER — ALUMINUM HYDROXIDE, MAGNESIUM HYDROXIDE, AND SIMETHICONE 2400; 240; 2400 MG/30ML; MG/30ML; MG/30ML
30 SUSPENSION ORAL EVERY 6 HOURS PRN
Status: DISCONTINUED | OUTPATIENT
Start: 2021-09-02 | End: 2021-09-07 | Stop reason: HOSPADM

## 2021-09-02 RX ADMIN — GABAPENTIN 300 MG: 300 CAPSULE ORAL at 09:09

## 2021-09-02 RX ADMIN — FLUTICASONE FUROATE AND VILANTEROL TRIFENATATE 1 PUFF: 100; 25 POWDER RESPIRATORY (INHALATION) at 09:09

## 2021-09-02 RX ADMIN — LIDOCAINE 1 PATCH: 50 PATCH TOPICAL at 03:09

## 2021-09-02 RX ADMIN — TORSEMIDE 20 MG: 20 TABLET ORAL at 09:09

## 2021-09-02 RX ADMIN — CALCIUM CARBONATE (ANTACID) CHEW TAB 500 MG 1500 MG: 500 CHEW TAB at 09:09

## 2021-09-02 RX ADMIN — GUAIFENESIN AND DEXTROMETHORPHAN HYDROBROMIDE 1 TABLET: 600; 30 TABLET, EXTENDED RELEASE ORAL at 09:09

## 2021-09-02 RX ADMIN — DICLOFENAC SODIUM 4 G: 10 GEL TOPICAL at 09:09

## 2021-09-02 RX ADMIN — HYDROCODONE BITARTRATE AND ACETAMINOPHEN 1 TABLET: 10; 325 TABLET ORAL at 11:09

## 2021-09-02 RX ADMIN — DICLOFENAC SODIUM 4 G: 10 GEL TOPICAL at 03:09

## 2021-09-02 RX ADMIN — CETIRIZINE HYDROCHLORIDE 10 MG: 5 TABLET ORAL at 09:09

## 2021-09-02 RX ADMIN — ATORVASTATIN CALCIUM 40 MG: 20 TABLET, FILM COATED ORAL at 09:09

## 2021-09-02 RX ADMIN — HYDROCODONE BITARTRATE AND ACETAMINOPHEN 1 TABLET: 10; 325 TABLET ORAL at 03:09

## 2021-09-02 RX ADMIN — FERROUS SULFATE TAB EC 325 MG (65 MG FE EQUIVALENT) 1 EACH: 325 (65 FE) TABLET DELAYED RESPONSE at 09:09

## 2021-09-02 RX ADMIN — HYDROCODONE BITARTRATE AND ACETAMINOPHEN 1 TABLET: 10; 325 TABLET ORAL at 09:09

## 2021-09-02 RX ADMIN — METHOCARBAMOL 500 MG: 500 TABLET ORAL at 09:09

## 2021-09-02 RX ADMIN — HYDROXYZINE HYDROCHLORIDE 25 MG: 25 TABLET, FILM COATED ORAL at 09:09

## 2021-09-02 RX ADMIN — METOPROLOL SUCCINATE 50 MG: 50 TABLET, EXTENDED RELEASE ORAL at 09:09

## 2021-09-02 RX ADMIN — METHOCARBAMOL 500 MG: 500 TABLET ORAL at 03:09

## 2021-09-02 RX ADMIN — Medication 250 MG: at 09:09

## 2021-09-02 RX ADMIN — Medication 400 MG: at 09:09

## 2021-09-02 RX ADMIN — MELATONIN TAB 3 MG 6 MG: 3 TAB at 09:09

## 2021-09-02 RX ADMIN — TIOTROPIUM BROMIDE INHALATION SPRAY 2 PUFF: 3.12 SPRAY, METERED RESPIRATORY (INHALATION) at 09:09

## 2021-09-02 RX ADMIN — LOSARTAN POTASSIUM 25 MG: 25 TABLET, FILM COATED ORAL at 09:09

## 2021-09-03 ENCOUNTER — TELEPHONE (OUTPATIENT)
Dept: INTERNAL MEDICINE | Facility: CLINIC | Age: 71
End: 2021-09-03

## 2021-09-03 ENCOUNTER — PATIENT MESSAGE (OUTPATIENT)
Dept: INTERNAL MEDICINE | Facility: CLINIC | Age: 71
End: 2021-09-03

## 2021-09-03 PROCEDURE — 94761 N-INVAS EAR/PLS OXIMETRY MLT: CPT

## 2021-09-03 PROCEDURE — 25000003 PHARM REV CODE 250: Performed by: PHYSICIAN ASSISTANT

## 2021-09-03 PROCEDURE — 11000004 HC SNF PRIVATE

## 2021-09-03 PROCEDURE — 25000003 PHARM REV CODE 250: Performed by: NURSE PRACTITIONER

## 2021-09-03 PROCEDURE — 97110 THERAPEUTIC EXERCISES: CPT | Mod: CQ

## 2021-09-03 PROCEDURE — 97535 SELF CARE MNGMENT TRAINING: CPT | Mod: CO

## 2021-09-03 PROCEDURE — 99900035 HC TECH TIME PER 15 MIN (STAT)

## 2021-09-03 PROCEDURE — 97116 GAIT TRAINING THERAPY: CPT | Mod: CQ

## 2021-09-03 PROCEDURE — 27000221 HC OXYGEN, UP TO 24 HOURS

## 2021-09-03 PROCEDURE — 97530 THERAPEUTIC ACTIVITIES: CPT | Mod: CQ

## 2021-09-03 RX ADMIN — FLUTICASONE FUROATE AND VILANTEROL TRIFENATATE 1 PUFF: 100; 25 POWDER RESPIRATORY (INHALATION) at 09:09

## 2021-09-03 RX ADMIN — ATORVASTATIN CALCIUM 40 MG: 20 TABLET, FILM COATED ORAL at 09:09

## 2021-09-03 RX ADMIN — TORSEMIDE 20 MG: 20 TABLET ORAL at 09:09

## 2021-09-03 RX ADMIN — FERROUS SULFATE TAB EC 325 MG (65 MG FE EQUIVALENT) 1 EACH: 325 (65 FE) TABLET DELAYED RESPONSE at 09:09

## 2021-09-03 RX ADMIN — GUAIFENESIN AND DEXTROMETHORPHAN HYDROBROMIDE 1 TABLET: 600; 30 TABLET, EXTENDED RELEASE ORAL at 09:09

## 2021-09-03 RX ADMIN — METHOCARBAMOL 500 MG: 500 TABLET ORAL at 09:09

## 2021-09-03 RX ADMIN — MELATONIN TAB 3 MG 6 MG: 3 TAB at 08:09

## 2021-09-03 RX ADMIN — METHOCARBAMOL 500 MG: 500 TABLET ORAL at 08:09

## 2021-09-03 RX ADMIN — Medication 250 MG: at 09:09

## 2021-09-03 RX ADMIN — Medication 400 MG: at 09:09

## 2021-09-03 RX ADMIN — GUAIFENESIN AND DEXTROMETHORPHAN HYDROBROMIDE 1 TABLET: 600; 30 TABLET, EXTENDED RELEASE ORAL at 08:09

## 2021-09-03 RX ADMIN — CALCIUM CARBONATE (ANTACID) CHEW TAB 500 MG 1500 MG: 500 CHEW TAB at 08:09

## 2021-09-03 RX ADMIN — DICLOFENAC SODIUM 4 G: 10 GEL TOPICAL at 03:09

## 2021-09-03 RX ADMIN — GABAPENTIN 300 MG: 300 CAPSULE ORAL at 03:09

## 2021-09-03 RX ADMIN — HYDROCODONE BITARTRATE AND ACETAMINOPHEN 1 TABLET: 10; 325 TABLET ORAL at 06:09

## 2021-09-03 RX ADMIN — METHOCARBAMOL 500 MG: 500 TABLET ORAL at 03:09

## 2021-09-03 RX ADMIN — LIDOCAINE 1 PATCH: 50 PATCH TOPICAL at 03:09

## 2021-09-03 RX ADMIN — HYDROXYZINE HYDROCHLORIDE 25 MG: 25 TABLET, FILM COATED ORAL at 08:09

## 2021-09-03 RX ADMIN — Medication 1 CAPSULE: at 09:09

## 2021-09-03 RX ADMIN — CALCIUM CARBONATE (ANTACID) CHEW TAB 500 MG 1500 MG: 500 CHEW TAB at 09:09

## 2021-09-03 RX ADMIN — DICLOFENAC SODIUM 4 G: 10 GEL TOPICAL at 09:09

## 2021-09-03 RX ADMIN — GABAPENTIN 300 MG: 300 CAPSULE ORAL at 09:09

## 2021-09-03 RX ADMIN — LOSARTAN POTASSIUM 25 MG: 25 TABLET, FILM COATED ORAL at 09:09

## 2021-09-03 RX ADMIN — DICLOFENAC SODIUM 4 G: 10 GEL TOPICAL at 08:09

## 2021-09-03 RX ADMIN — TIOTROPIUM BROMIDE INHALATION SPRAY 2 PUFF: 3.12 SPRAY, METERED RESPIRATORY (INHALATION) at 09:09

## 2021-09-03 RX ADMIN — CETIRIZINE HYDROCHLORIDE 10 MG: 5 TABLET ORAL at 08:09

## 2021-09-03 RX ADMIN — METOPROLOL SUCCINATE 50 MG: 50 TABLET, EXTENDED RELEASE ORAL at 09:09

## 2021-09-03 RX ADMIN — HYDROCODONE BITARTRATE AND ACETAMINOPHEN 1 TABLET: 10; 325 TABLET ORAL at 11:09

## 2021-09-04 PROCEDURE — 27000221 HC OXYGEN, UP TO 24 HOURS

## 2021-09-04 PROCEDURE — 99900035 HC TECH TIME PER 15 MIN (STAT)

## 2021-09-04 PROCEDURE — 25000003 PHARM REV CODE 250: Performed by: PHYSICIAN ASSISTANT

## 2021-09-04 PROCEDURE — 97110 THERAPEUTIC EXERCISES: CPT

## 2021-09-04 PROCEDURE — 97116 GAIT TRAINING THERAPY: CPT

## 2021-09-04 PROCEDURE — 25000003 PHARM REV CODE 250: Performed by: NURSE PRACTITIONER

## 2021-09-04 PROCEDURE — 97530 THERAPEUTIC ACTIVITIES: CPT | Mod: CO

## 2021-09-04 PROCEDURE — 94761 N-INVAS EAR/PLS OXIMETRY MLT: CPT

## 2021-09-04 PROCEDURE — 11000004 HC SNF PRIVATE

## 2021-09-04 RX ADMIN — LIDOCAINE 1 PATCH: 50 PATCH TOPICAL at 03:09

## 2021-09-04 RX ADMIN — METHOCARBAMOL 500 MG: 500 TABLET ORAL at 08:09

## 2021-09-04 RX ADMIN — HYDROXYZINE HYDROCHLORIDE 25 MG: 25 TABLET, FILM COATED ORAL at 08:09

## 2021-09-04 RX ADMIN — GUAIFENESIN AND DEXTROMETHORPHAN HYDROBROMIDE 1 TABLET: 600; 30 TABLET, EXTENDED RELEASE ORAL at 08:09

## 2021-09-04 RX ADMIN — FERROUS SULFATE TAB EC 325 MG (65 MG FE EQUIVALENT) 1 EACH: 325 (65 FE) TABLET DELAYED RESPONSE at 08:09

## 2021-09-04 RX ADMIN — MELATONIN TAB 3 MG 6 MG: 3 TAB at 08:09

## 2021-09-04 RX ADMIN — CALCIUM CARBONATE (ANTACID) CHEW TAB 500 MG 1500 MG: 500 CHEW TAB at 08:09

## 2021-09-04 RX ADMIN — DICLOFENAC SODIUM 4 G: 10 GEL TOPICAL at 08:09

## 2021-09-04 RX ADMIN — FLUTICASONE FUROATE AND VILANTEROL TRIFENATATE 1 PUFF: 100; 25 POWDER RESPIRATORY (INHALATION) at 08:09

## 2021-09-04 RX ADMIN — LOSARTAN POTASSIUM 25 MG: 25 TABLET, FILM COATED ORAL at 08:09

## 2021-09-04 RX ADMIN — DICLOFENAC SODIUM 4 G: 10 GEL TOPICAL at 03:09

## 2021-09-04 RX ADMIN — METHOCARBAMOL 500 MG: 500 TABLET ORAL at 03:09

## 2021-09-04 RX ADMIN — Medication 400 MG: at 08:09

## 2021-09-04 RX ADMIN — GABAPENTIN 300 MG: 300 CAPSULE ORAL at 08:09

## 2021-09-04 RX ADMIN — Medication 1 CAPSULE: at 08:09

## 2021-09-04 RX ADMIN — CETIRIZINE HYDROCHLORIDE 10 MG: 5 TABLET ORAL at 08:09

## 2021-09-04 RX ADMIN — TIOTROPIUM BROMIDE INHALATION SPRAY 2 PUFF: 3.12 SPRAY, METERED RESPIRATORY (INHALATION) at 08:09

## 2021-09-04 RX ADMIN — HYDROCODONE BITARTRATE AND ACETAMINOPHEN 1 TABLET: 10; 325 TABLET ORAL at 06:09

## 2021-09-04 RX ADMIN — ATORVASTATIN CALCIUM 40 MG: 20 TABLET, FILM COATED ORAL at 08:09

## 2021-09-04 RX ADMIN — METOPROLOL SUCCINATE 50 MG: 50 TABLET, EXTENDED RELEASE ORAL at 08:09

## 2021-09-04 RX ADMIN — HYDROCODONE BITARTRATE AND ACETAMINOPHEN 1 TABLET: 10; 325 TABLET ORAL at 08:09

## 2021-09-04 RX ADMIN — TORSEMIDE 20 MG: 20 TABLET ORAL at 08:09

## 2021-09-04 RX ADMIN — Medication 250 MG: at 08:09

## 2021-09-05 PROCEDURE — 25000003 PHARM REV CODE 250: Performed by: NURSE PRACTITIONER

## 2021-09-05 PROCEDURE — 94761 N-INVAS EAR/PLS OXIMETRY MLT: CPT

## 2021-09-05 PROCEDURE — 99900035 HC TECH TIME PER 15 MIN (STAT)

## 2021-09-05 PROCEDURE — 27000221 HC OXYGEN, UP TO 24 HOURS

## 2021-09-05 PROCEDURE — 25000003 PHARM REV CODE 250: Performed by: PHYSICIAN ASSISTANT

## 2021-09-05 PROCEDURE — 11000004 HC SNF PRIVATE

## 2021-09-05 RX ADMIN — CALCIUM CARBONATE (ANTACID) CHEW TAB 500 MG 1500 MG: 500 CHEW TAB at 08:09

## 2021-09-05 RX ADMIN — Medication 400 MG: at 09:09

## 2021-09-05 RX ADMIN — LOSARTAN POTASSIUM 25 MG: 25 TABLET, FILM COATED ORAL at 09:09

## 2021-09-05 RX ADMIN — Medication 250 MG: at 09:09

## 2021-09-05 RX ADMIN — HYDROCODONE BITARTRATE AND ACETAMINOPHEN 1 TABLET: 10; 325 TABLET ORAL at 08:09

## 2021-09-05 RX ADMIN — CALCIUM CARBONATE (ANTACID) CHEW TAB 500 MG 1500 MG: 500 CHEW TAB at 09:09

## 2021-09-05 RX ADMIN — ATORVASTATIN CALCIUM 40 MG: 20 TABLET, FILM COATED ORAL at 09:09

## 2021-09-05 RX ADMIN — HYDROXYZINE HYDROCHLORIDE 25 MG: 25 TABLET, FILM COATED ORAL at 08:09

## 2021-09-05 RX ADMIN — TIOTROPIUM BROMIDE INHALATION SPRAY 2 PUFF: 3.12 SPRAY, METERED RESPIRATORY (INHALATION) at 09:09

## 2021-09-05 RX ADMIN — MELATONIN TAB 3 MG 6 MG: 3 TAB at 08:09

## 2021-09-05 RX ADMIN — TORSEMIDE 20 MG: 20 TABLET ORAL at 09:09

## 2021-09-05 RX ADMIN — FLUTICASONE FUROATE AND VILANTEROL TRIFENATATE 1 PUFF: 100; 25 POWDER RESPIRATORY (INHALATION) at 09:09

## 2021-09-05 RX ADMIN — DICLOFENAC SODIUM 4 G: 10 GEL TOPICAL at 09:09

## 2021-09-05 RX ADMIN — GUAIFENESIN AND DEXTROMETHORPHAN HYDROBROMIDE 1 TABLET: 600; 30 TABLET, EXTENDED RELEASE ORAL at 09:09

## 2021-09-05 RX ADMIN — METHOCARBAMOL 500 MG: 500 TABLET ORAL at 09:09

## 2021-09-05 RX ADMIN — DICLOFENAC SODIUM 4 G: 10 GEL TOPICAL at 08:09

## 2021-09-05 RX ADMIN — Medication 1 CAPSULE: at 09:09

## 2021-09-05 RX ADMIN — GUAIFENESIN AND DEXTROMETHORPHAN HYDROBROMIDE 1 TABLET: 600; 30 TABLET, EXTENDED RELEASE ORAL at 08:09

## 2021-09-05 RX ADMIN — METOPROLOL SUCCINATE 50 MG: 50 TABLET, EXTENDED RELEASE ORAL at 09:09

## 2021-09-05 RX ADMIN — METHOCARBAMOL 500 MG: 500 TABLET ORAL at 03:09

## 2021-09-05 RX ADMIN — CETIRIZINE HYDROCHLORIDE 10 MG: 5 TABLET ORAL at 08:09

## 2021-09-05 RX ADMIN — METHOCARBAMOL 500 MG: 500 TABLET ORAL at 08:09

## 2021-09-05 RX ADMIN — FERROUS SULFATE TAB EC 325 MG (65 MG FE EQUIVALENT) 1 EACH: 325 (65 FE) TABLET DELAYED RESPONSE at 09:09

## 2021-09-05 RX ADMIN — HYDROCODONE BITARTRATE AND ACETAMINOPHEN 1 TABLET: 10; 325 TABLET ORAL at 09:09

## 2021-09-05 RX ADMIN — DICLOFENAC SODIUM 4 G: 10 GEL TOPICAL at 03:09

## 2021-09-05 RX ADMIN — LIDOCAINE 1 PATCH: 50 PATCH TOPICAL at 03:09

## 2021-09-05 RX ADMIN — HYDROXYZINE HYDROCHLORIDE 25 MG: 25 TABLET, FILM COATED ORAL at 03:09

## 2021-09-06 PROCEDURE — 11000004 HC SNF PRIVATE

## 2021-09-06 PROCEDURE — 25000003 PHARM REV CODE 250: Performed by: NURSE PRACTITIONER

## 2021-09-06 PROCEDURE — 99900035 HC TECH TIME PER 15 MIN (STAT)

## 2021-09-06 PROCEDURE — 25000003 PHARM REV CODE 250: Performed by: PHYSICIAN ASSISTANT

## 2021-09-06 PROCEDURE — 27000221 HC OXYGEN, UP TO 24 HOURS

## 2021-09-06 PROCEDURE — 94761 N-INVAS EAR/PLS OXIMETRY MLT: CPT

## 2021-09-06 RX ADMIN — LOSARTAN POTASSIUM 25 MG: 25 TABLET, FILM COATED ORAL at 08:09

## 2021-09-06 RX ADMIN — FLUTICASONE FUROATE AND VILANTEROL TRIFENATATE 1 PUFF: 100; 25 POWDER RESPIRATORY (INHALATION) at 08:09

## 2021-09-06 RX ADMIN — METHOCARBAMOL 500 MG: 500 TABLET ORAL at 08:09

## 2021-09-06 RX ADMIN — Medication 250 MG: at 08:09

## 2021-09-06 RX ADMIN — MELATONIN TAB 3 MG 6 MG: 3 TAB at 09:09

## 2021-09-06 RX ADMIN — GUAIFENESIN AND DEXTROMETHORPHAN HYDROBROMIDE 1 TABLET: 600; 30 TABLET, EXTENDED RELEASE ORAL at 09:09

## 2021-09-06 RX ADMIN — HYDROCODONE BITARTRATE AND ACETAMINOPHEN 1 TABLET: 10; 325 TABLET ORAL at 02:09

## 2021-09-06 RX ADMIN — Medication 400 MG: at 08:09

## 2021-09-06 RX ADMIN — METHOCARBAMOL 500 MG: 500 TABLET ORAL at 02:09

## 2021-09-06 RX ADMIN — METHOCARBAMOL 500 MG: 500 TABLET ORAL at 09:09

## 2021-09-06 RX ADMIN — HYDROCODONE BITARTRATE AND ACETAMINOPHEN 1 TABLET: 10; 325 TABLET ORAL at 09:09

## 2021-09-06 RX ADMIN — FERROUS SULFATE TAB EC 325 MG (65 MG FE EQUIVALENT) 1 EACH: 325 (65 FE) TABLET DELAYED RESPONSE at 08:09

## 2021-09-06 RX ADMIN — HYDROCODONE BITARTRATE AND ACETAMINOPHEN 1 TABLET: 10; 325 TABLET ORAL at 08:09

## 2021-09-06 RX ADMIN — DICLOFENAC SODIUM 4 G: 10 GEL TOPICAL at 02:09

## 2021-09-06 RX ADMIN — Medication 1 CAPSULE: at 08:09

## 2021-09-06 RX ADMIN — DICLOFENAC SODIUM 4 G: 10 GEL TOPICAL at 08:09

## 2021-09-06 RX ADMIN — LIDOCAINE 1 PATCH: 50 PATCH TOPICAL at 02:09

## 2021-09-06 RX ADMIN — CALCIUM CARBONATE (ANTACID) CHEW TAB 500 MG 1500 MG: 500 CHEW TAB at 09:09

## 2021-09-06 RX ADMIN — METOPROLOL SUCCINATE 50 MG: 50 TABLET, EXTENDED RELEASE ORAL at 08:09

## 2021-09-06 RX ADMIN — TIOTROPIUM BROMIDE INHALATION SPRAY 2 PUFF: 3.12 SPRAY, METERED RESPIRATORY (INHALATION) at 08:09

## 2021-09-06 RX ADMIN — ATORVASTATIN CALCIUM 40 MG: 20 TABLET, FILM COATED ORAL at 08:09

## 2021-09-06 RX ADMIN — GUAIFENESIN AND DEXTROMETHORPHAN HYDROBROMIDE 1 TABLET: 600; 30 TABLET, EXTENDED RELEASE ORAL at 08:09

## 2021-09-06 RX ADMIN — DICLOFENAC SODIUM 4 G: 10 GEL TOPICAL at 09:09

## 2021-09-06 RX ADMIN — CETIRIZINE HYDROCHLORIDE 10 MG: 5 TABLET ORAL at 09:09

## 2021-09-06 RX ADMIN — GABAPENTIN 300 MG: 300 CAPSULE ORAL at 09:09

## 2021-09-06 RX ADMIN — TORSEMIDE 20 MG: 20 TABLET ORAL at 08:09

## 2021-09-06 RX ADMIN — CALCIUM CARBONATE (ANTACID) CHEW TAB 500 MG 1500 MG: 500 CHEW TAB at 08:09

## 2021-09-07 VITALS
BODY MASS INDEX: 29.97 KG/M2 | TEMPERATURE: 98 F | DIASTOLIC BLOOD PRESSURE: 60 MMHG | WEIGHT: 190.94 LBS | RESPIRATION RATE: 18 BRPM | SYSTOLIC BLOOD PRESSURE: 128 MMHG | HEIGHT: 67 IN | HEART RATE: 72 BPM | OXYGEN SATURATION: 97 %

## 2021-09-07 PROCEDURE — 97110 THERAPEUTIC EXERCISES: CPT | Mod: CQ

## 2021-09-07 PROCEDURE — 99900035 HC TECH TIME PER 15 MIN (STAT)

## 2021-09-07 PROCEDURE — 27000221 HC OXYGEN, UP TO 24 HOURS

## 2021-09-07 PROCEDURE — 97530 THERAPEUTIC ACTIVITIES: CPT

## 2021-09-07 PROCEDURE — 97530 THERAPEUTIC ACTIVITIES: CPT | Mod: CQ

## 2021-09-07 PROCEDURE — 25000003 PHARM REV CODE 250: Performed by: NURSE PRACTITIONER

## 2021-09-07 PROCEDURE — 97116 GAIT TRAINING THERAPY: CPT | Mod: CQ

## 2021-09-07 PROCEDURE — 25000003 PHARM REV CODE 250: Performed by: PHYSICIAN ASSISTANT

## 2021-09-07 RX ORDER — GABAPENTIN 300 MG/1
300 CAPSULE ORAL 3 TIMES DAILY
Qty: 90 CAPSULE | Refills: 11 | Status: SHIPPED | OUTPATIENT
Start: 2021-09-07 | End: 2022-10-10 | Stop reason: SDUPTHER

## 2021-09-07 RX ORDER — NAPROXEN SODIUM 220 MG/1
81 TABLET, FILM COATED ORAL DAILY
Qty: 90 TABLET | Refills: 3 | Status: SHIPPED | OUTPATIENT
Start: 2021-09-07 | End: 2022-03-18

## 2021-09-07 RX ORDER — CALCIUM CARBONATE 500(1250)
1000 TABLET ORAL 2 TIMES DAILY
Qty: 30 TABLET | Refills: 0 | Status: ON HOLD | OUTPATIENT
Start: 2021-09-07 | End: 2023-06-15 | Stop reason: HOSPADM

## 2021-09-07 RX ORDER — POTASSIUM CHLORIDE 750 MG/1
10 TABLET, EXTENDED RELEASE ORAL DAILY
Qty: 90 TABLET | Refills: 3 | Status: SHIPPED | OUTPATIENT
Start: 2021-09-07 | End: 2022-02-28

## 2021-09-07 RX ORDER — FLUTICASONE PROPIONATE AND SALMETEROL XINAFOATE 115; 21 UG/1; UG/1
2 AEROSOL, METERED RESPIRATORY (INHALATION) EVERY 12 HOURS
Qty: 36 G | Refills: 3 | Status: SHIPPED | OUTPATIENT
Start: 2021-09-07 | End: 2021-10-12 | Stop reason: SDUPTHER

## 2021-09-07 RX ORDER — METHOCARBAMOL 500 MG/1
500 TABLET, FILM COATED ORAL 3 TIMES DAILY
Qty: 30 TABLET | Refills: 0 | Status: SHIPPED | OUTPATIENT
Start: 2021-09-07 | End: 2021-09-17

## 2021-09-07 RX ORDER — CHOLECALCIFEROL (VITAMIN D3) 25 MCG
1000 TABLET ORAL DAILY
Qty: 30 TABLET | Refills: 2 | Status: ON HOLD | OUTPATIENT
Start: 2021-09-07 | End: 2023-06-15

## 2021-09-07 RX ORDER — HYDROCODONE BITARTRATE AND ACETAMINOPHEN 5; 325 MG/1; MG/1
1 TABLET ORAL EVERY 6 HOURS PRN
Qty: 30 TABLET | Refills: 0 | Status: SHIPPED | OUTPATIENT
Start: 2021-09-07 | End: 2021-09-14

## 2021-09-07 RX ORDER — ACETAMINOPHEN, DEXTROMETHORPHAN HBR, DOXYLAMINE SUCCINATE, PHENYLEPHRINE HCL 650; 20; 12.5; 1 MG/30ML; MG/30ML; MG/30ML; MG/30ML
1000 SOLUTION ORAL DAILY
Status: ON HOLD | COMMUNITY
Start: 2021-09-07 | End: 2023-06-15

## 2021-09-07 RX ORDER — ALBUTEROL SULFATE 90 UG/1
AEROSOL, METERED RESPIRATORY (INHALATION)
Qty: 25.5 G | Refills: 11 | Status: SHIPPED | OUTPATIENT
Start: 2021-09-07 | End: 2022-06-08

## 2021-09-07 RX ORDER — ATORVASTATIN CALCIUM 40 MG/1
TABLET, FILM COATED ORAL
Qty: 90 TABLET | Refills: 3 | Status: SHIPPED | OUTPATIENT
Start: 2021-09-07 | End: 2022-08-29 | Stop reason: SDUPTHER

## 2021-09-07 RX ORDER — LANOLIN ALCOHOL/MO/W.PET/CERES
CREAM (GRAM) TOPICAL EVERY 12 HOURS
Qty: 30 TABLET | Refills: 0 | Status: ON HOLD | OUTPATIENT
Start: 2021-09-07 | End: 2023-06-15

## 2021-09-07 RX ORDER — METOPROLOL SUCCINATE 25 MG/1
50 TABLET, EXTENDED RELEASE ORAL DAILY
Qty: 60 TABLET | Refills: 11 | Status: SHIPPED | OUTPATIENT
Start: 2021-09-07 | End: 2022-09-20 | Stop reason: SDUPTHER

## 2021-09-07 RX ORDER — FERROUS SULFATE 325(65) MG
1 TABLET ORAL DAILY
Qty: 30 TABLET | Refills: 2 | Status: SHIPPED | OUTPATIENT
Start: 2021-09-07 | End: 2022-03-18

## 2021-09-07 RX ORDER — LOSARTAN POTASSIUM 25 MG/1
25 TABLET ORAL DAILY
Qty: 30 TABLET | Refills: 11 | Status: SHIPPED | OUTPATIENT
Start: 2021-09-07 | End: 2022-04-29

## 2021-09-07 RX ORDER — TORSEMIDE 20 MG/1
20 TABLET ORAL DAILY
Qty: 30 TABLET | Refills: 11 | Status: SHIPPED | OUTPATIENT
Start: 2021-09-08 | End: 2022-08-29 | Stop reason: SDUPTHER

## 2021-09-07 RX ADMIN — DICLOFENAC SODIUM 4 G: 10 GEL TOPICAL at 08:09

## 2021-09-07 RX ADMIN — ATORVASTATIN CALCIUM 40 MG: 20 TABLET, FILM COATED ORAL at 08:09

## 2021-09-07 RX ADMIN — METHOCARBAMOL 500 MG: 500 TABLET ORAL at 02:09

## 2021-09-07 RX ADMIN — DICLOFENAC SODIUM 4 G: 10 GEL TOPICAL at 02:09

## 2021-09-07 RX ADMIN — LOSARTAN POTASSIUM 25 MG: 25 TABLET, FILM COATED ORAL at 09:09

## 2021-09-07 RX ADMIN — TORSEMIDE 20 MG: 20 TABLET ORAL at 08:09

## 2021-09-07 RX ADMIN — HYDROCODONE BITARTRATE AND ACETAMINOPHEN 1 TABLET: 10; 325 TABLET ORAL at 02:09

## 2021-09-07 RX ADMIN — FLUTICASONE FUROATE AND VILANTEROL TRIFENATATE 1 PUFF: 100; 25 POWDER RESPIRATORY (INHALATION) at 08:09

## 2021-09-07 RX ADMIN — CALCIUM CARBONATE (ANTACID) CHEW TAB 500 MG 1500 MG: 500 CHEW TAB at 08:09

## 2021-09-07 RX ADMIN — METHOCARBAMOL 500 MG: 500 TABLET ORAL at 08:09

## 2021-09-07 RX ADMIN — GUAIFENESIN AND DEXTROMETHORPHAN HYDROBROMIDE 1 TABLET: 600; 30 TABLET, EXTENDED RELEASE ORAL at 08:09

## 2021-09-07 RX ADMIN — TIOTROPIUM BROMIDE INHALATION SPRAY 2 PUFF: 3.12 SPRAY, METERED RESPIRATORY (INHALATION) at 08:09

## 2021-09-07 RX ADMIN — Medication 400 MG: at 08:09

## 2021-09-07 RX ADMIN — LIDOCAINE 1 PATCH: 50 PATCH TOPICAL at 02:09

## 2021-09-07 RX ADMIN — HYDROCODONE BITARTRATE AND ACETAMINOPHEN 1 TABLET: 10; 325 TABLET ORAL at 08:09

## 2021-09-07 RX ADMIN — Medication 1 CAPSULE: at 08:09

## 2021-09-07 RX ADMIN — Medication 250 MG: at 08:09

## 2021-09-07 RX ADMIN — FERROUS SULFATE TAB EC 325 MG (65 MG FE EQUIVALENT) 1 EACH: 325 (65 FE) TABLET DELAYED RESPONSE at 08:09

## 2021-09-07 RX ADMIN — METOPROLOL SUCCINATE 50 MG: 50 TABLET, EXTENDED RELEASE ORAL at 09:09

## 2021-09-10 ENCOUNTER — OFFICE VISIT (OUTPATIENT)
Dept: CARDIOLOGY | Facility: CLINIC | Age: 71
End: 2021-09-10
Payer: MEDICARE

## 2021-09-10 DIAGNOSIS — E66.9 OBESITY (BMI 35.0-39.9 WITHOUT COMORBIDITY): ICD-10-CM

## 2021-09-10 DIAGNOSIS — I10 ESSENTIAL HYPERTENSION: Chronic | ICD-10-CM

## 2021-09-10 DIAGNOSIS — E78.5 HYPERLIPIDEMIA, UNSPECIFIED HYPERLIPIDEMIA TYPE: Chronic | ICD-10-CM

## 2021-09-10 DIAGNOSIS — F17.200 TOBACCO DEPENDENCE: ICD-10-CM

## 2021-09-10 DIAGNOSIS — I50.42 CHRONIC COMBINED SYSTOLIC AND DIASTOLIC CHF (CONGESTIVE HEART FAILURE): Primary | Chronic | ICD-10-CM

## 2021-09-10 DIAGNOSIS — J44.9 CHRONIC OBSTRUCTIVE PULMONARY DISEASE, UNSPECIFIED COPD TYPE: ICD-10-CM

## 2021-09-10 PROCEDURE — 4010F ACE/ARB THERAPY RXD/TAKEN: CPT | Mod: CPTII,95,, | Performed by: INTERNAL MEDICINE

## 2021-09-10 PROCEDURE — 1160F RVW MEDS BY RX/DR IN RCRD: CPT | Mod: CPTII,95,, | Performed by: INTERNAL MEDICINE

## 2021-09-10 PROCEDURE — 99212 PR OFFICE/OUTPT VISIT, EST, LEVL II, 10-19 MIN: ICD-10-PCS | Mod: 95,,, | Performed by: INTERNAL MEDICINE

## 2021-09-10 PROCEDURE — 99499 RISK ADDL DX/OHS AUDIT: ICD-10-PCS | Mod: 95,,, | Performed by: INTERNAL MEDICINE

## 2021-09-10 PROCEDURE — 99212 OFFICE O/P EST SF 10 MIN: CPT | Mod: 95,,, | Performed by: INTERNAL MEDICINE

## 2021-09-10 PROCEDURE — 1159F PR MEDICATION LIST DOCUMENTED IN MEDICAL RECORD: ICD-10-PCS | Mod: CPTII,95,, | Performed by: INTERNAL MEDICINE

## 2021-09-10 PROCEDURE — 1111F DSCHRG MED/CURRENT MED MERGE: CPT | Mod: CPTII,95,, | Performed by: INTERNAL MEDICINE

## 2021-09-10 PROCEDURE — 99499 UNLISTED E&M SERVICE: CPT | Mod: 95,,, | Performed by: INTERNAL MEDICINE

## 2021-09-10 PROCEDURE — 1160F PR REVIEW ALL MEDS BY PRESCRIBER/CLIN PHARMACIST DOCUMENTED: ICD-10-PCS | Mod: CPTII,95,, | Performed by: INTERNAL MEDICINE

## 2021-09-10 PROCEDURE — 4010F PR ACE/ARB THEARPY RXD/TAKEN: ICD-10-PCS | Mod: CPTII,95,, | Performed by: INTERNAL MEDICINE

## 2021-09-10 PROCEDURE — 1111F PR DISCHARGE MEDS RECONCILED W/ CURRENT OUTPATIENT MED LIST: ICD-10-PCS | Mod: CPTII,95,, | Performed by: INTERNAL MEDICINE

## 2021-09-10 PROCEDURE — 1159F MED LIST DOCD IN RCRD: CPT | Mod: CPTII,95,, | Performed by: INTERNAL MEDICINE

## 2021-09-14 ENCOUNTER — OFFICE VISIT (OUTPATIENT)
Dept: INTERNAL MEDICINE | Facility: CLINIC | Age: 71
End: 2021-09-14
Attending: INTERNAL MEDICINE
Payer: MEDICARE

## 2021-09-14 DIAGNOSIS — D50.0 IRON DEFICIENCY ANEMIA DUE TO CHRONIC BLOOD LOSS: ICD-10-CM

## 2021-09-14 DIAGNOSIS — M17.0 PRIMARY OSTEOARTHRITIS OF BOTH KNEES: ICD-10-CM

## 2021-09-14 DIAGNOSIS — E83.42 HYPOMAGNESEMIA: ICD-10-CM

## 2021-09-14 DIAGNOSIS — I50.42 CHRONIC COMBINED SYSTOLIC AND DIASTOLIC CHF (CONGESTIVE HEART FAILURE): Chronic | ICD-10-CM

## 2021-09-14 DIAGNOSIS — M80.00XD AGE-RELATED OSTEOPOROSIS WITH CURRENT PATHOLOGICAL FRACTURE WITH ROUTINE HEALING, SUBSEQUENT ENCOUNTER: ICD-10-CM

## 2021-09-14 DIAGNOSIS — E87.1 HYPONATREMIA: ICD-10-CM

## 2021-09-14 DIAGNOSIS — F17.200 TOBACCO DEPENDENCE: ICD-10-CM

## 2021-09-14 DIAGNOSIS — J96.11 CHRONIC HYPOXEMIC RESPIRATORY FAILURE: ICD-10-CM

## 2021-09-14 DIAGNOSIS — Z09 HOSPITAL DISCHARGE FOLLOW-UP: ICD-10-CM

## 2021-09-14 DIAGNOSIS — G89.4 CHRONIC PAIN SYNDROME: Primary | ICD-10-CM

## 2021-09-14 PROCEDURE — 3288F PR FALLS RISK ASSESSMENT DOCUMENTED: ICD-10-PCS | Mod: CPTII,95,, | Performed by: INTERNAL MEDICINE

## 2021-09-14 PROCEDURE — 99214 OFFICE O/P EST MOD 30 MIN: CPT | Mod: 95,,, | Performed by: INTERNAL MEDICINE

## 2021-09-14 PROCEDURE — 1111F DSCHRG MED/CURRENT MED MERGE: CPT | Mod: CPTII,95,, | Performed by: INTERNAL MEDICINE

## 2021-09-14 PROCEDURE — 1101F PR PT FALLS ASSESS DOC 0-1 FALLS W/OUT INJ PAST YR: ICD-10-PCS | Mod: CPTII,95,, | Performed by: INTERNAL MEDICINE

## 2021-09-14 PROCEDURE — 3288F FALL RISK ASSESSMENT DOCD: CPT | Mod: CPTII,95,, | Performed by: INTERNAL MEDICINE

## 2021-09-14 PROCEDURE — 1159F MED LIST DOCD IN RCRD: CPT | Mod: CPTII,95,, | Performed by: INTERNAL MEDICINE

## 2021-09-14 PROCEDURE — 4010F ACE/ARB THERAPY RXD/TAKEN: CPT | Mod: CPTII,95,, | Performed by: INTERNAL MEDICINE

## 2021-09-14 PROCEDURE — 1101F PT FALLS ASSESS-DOCD LE1/YR: CPT | Mod: CPTII,95,, | Performed by: INTERNAL MEDICINE

## 2021-09-14 PROCEDURE — 4010F PR ACE/ARB THEARPY RXD/TAKEN: ICD-10-PCS | Mod: CPTII,95,, | Performed by: INTERNAL MEDICINE

## 2021-09-14 PROCEDURE — 1125F PR PAIN SEVERITY QUANTIFIED, PAIN PRESENT: ICD-10-PCS | Mod: CPTII,95,, | Performed by: INTERNAL MEDICINE

## 2021-09-14 PROCEDURE — 1159F PR MEDICATION LIST DOCUMENTED IN MEDICAL RECORD: ICD-10-PCS | Mod: CPTII,95,, | Performed by: INTERNAL MEDICINE

## 2021-09-14 PROCEDURE — 1160F RVW MEDS BY RX/DR IN RCRD: CPT | Mod: CPTII,95,, | Performed by: INTERNAL MEDICINE

## 2021-09-14 PROCEDURE — 1160F PR REVIEW ALL MEDS BY PRESCRIBER/CLIN PHARMACIST DOCUMENTED: ICD-10-PCS | Mod: CPTII,95,, | Performed by: INTERNAL MEDICINE

## 2021-09-14 PROCEDURE — 1111F PR DISCHARGE MEDS RECONCILED W/ CURRENT OUTPATIENT MED LIST: ICD-10-PCS | Mod: CPTII,95,, | Performed by: INTERNAL MEDICINE

## 2021-09-14 PROCEDURE — 1125F AMNT PAIN NOTED PAIN PRSNT: CPT | Mod: CPTII,95,, | Performed by: INTERNAL MEDICINE

## 2021-09-14 PROCEDURE — 99214 PR OFFICE/OUTPT VISIT, EST, LEVL IV, 30-39 MIN: ICD-10-PCS | Mod: 95,,, | Performed by: INTERNAL MEDICINE

## 2021-09-14 RX ORDER — HYDROCODONE BITARTRATE AND ACETAMINOPHEN 10; 325 MG/1; MG/1
1 TABLET ORAL EVERY 12 HOURS PRN
Qty: 60 TABLET | Refills: 0 | Status: SHIPPED | OUTPATIENT
Start: 2021-09-14 | End: 2021-10-12 | Stop reason: SDUPTHER

## 2021-09-23 ENCOUNTER — CLINICAL SUPPORT (OUTPATIENT)
Dept: SMOKING CESSATION | Facility: CLINIC | Age: 71
End: 2021-09-23
Payer: COMMERCIAL

## 2021-09-23 DIAGNOSIS — F17.200 NICOTINE DEPENDENCE: Primary | ICD-10-CM

## 2021-09-23 PROCEDURE — 99407 BEHAV CHNG SMOKING > 10 MIN: CPT | Mod: S$GLB,,,

## 2021-09-23 PROCEDURE — 99407 PR TOBACCO USE CESSATION INTENSIVE >10 MINUTES: ICD-10-PCS | Mod: S$GLB,,,

## 2021-10-04 RX ORDER — PANTOPRAZOLE SODIUM 40 MG/1
40 TABLET, DELAYED RELEASE ORAL DAILY
Qty: 90 TABLET | Refills: 0 | Status: SHIPPED | OUTPATIENT
Start: 2021-10-04 | End: 2022-01-05 | Stop reason: SDUPTHER

## 2021-10-12 ENCOUNTER — PATIENT MESSAGE (OUTPATIENT)
Dept: INTERNAL MEDICINE | Facility: CLINIC | Age: 71
End: 2021-10-12

## 2021-10-12 DIAGNOSIS — G89.4 CHRONIC PAIN SYNDROME: ICD-10-CM

## 2021-10-12 DIAGNOSIS — M17.0 PRIMARY OSTEOARTHRITIS OF BOTH KNEES: ICD-10-CM

## 2021-10-12 DIAGNOSIS — J42 CHRONIC BRONCHITIS, UNSPECIFIED CHRONIC BRONCHITIS TYPE: ICD-10-CM

## 2021-10-12 RX ORDER — FLUTICASONE PROPIONATE AND SALMETEROL XINAFOATE 115; 21 UG/1; UG/1
2 AEROSOL, METERED RESPIRATORY (INHALATION) EVERY 12 HOURS
Qty: 36 G | Refills: 3 | Status: SHIPPED | OUTPATIENT
Start: 2021-10-12 | End: 2022-11-29 | Stop reason: SDUPTHER

## 2021-10-12 RX ORDER — HYDROCODONE BITARTRATE AND ACETAMINOPHEN 10; 325 MG/1; MG/1
1 TABLET ORAL EVERY 12 HOURS PRN
Qty: 60 TABLET | Refills: 0 | Status: SHIPPED | OUTPATIENT
Start: 2021-10-12 | End: 2021-11-12 | Stop reason: SDUPTHER

## 2021-10-22 ENCOUNTER — PES CALL (OUTPATIENT)
Dept: ADMINISTRATIVE | Facility: CLINIC | Age: 71
End: 2021-10-22

## 2021-11-04 ENCOUNTER — TELEPHONE (OUTPATIENT)
Dept: RADIATION ONCOLOGY | Facility: CLINIC | Age: 71
End: 2021-11-04
Payer: MEDICARE

## 2021-11-04 DIAGNOSIS — Z85.118 HISTORY OF LUNG CANCER: Primary | ICD-10-CM

## 2021-11-11 ENCOUNTER — PATIENT MESSAGE (OUTPATIENT)
Dept: INTERNAL MEDICINE | Facility: CLINIC | Age: 71
End: 2021-11-11
Payer: MEDICARE

## 2021-11-11 DIAGNOSIS — M17.0 PRIMARY OSTEOARTHRITIS OF BOTH KNEES: ICD-10-CM

## 2021-11-11 DIAGNOSIS — G89.4 CHRONIC PAIN SYNDROME: ICD-10-CM

## 2021-11-12 RX ORDER — HYDROCODONE BITARTRATE AND ACETAMINOPHEN 10; 325 MG/1; MG/1
1 TABLET ORAL EVERY 12 HOURS PRN
Qty: 60 TABLET | Refills: 0 | Status: SHIPPED | OUTPATIENT
Start: 2021-11-12 | End: 2021-12-15 | Stop reason: SDUPTHER

## 2021-11-16 ENCOUNTER — DOCUMENTATION ONLY (OUTPATIENT)
Dept: HEMATOLOGY/ONCOLOGY | Facility: CLINIC | Age: 71
End: 2021-11-16
Payer: MEDICARE

## 2021-11-22 ENCOUNTER — PES CALL (OUTPATIENT)
Dept: ADMINISTRATIVE | Facility: CLINIC | Age: 71
End: 2021-11-22
Payer: MEDICARE

## 2021-12-14 ENCOUNTER — PATIENT MESSAGE (OUTPATIENT)
Dept: INTERNAL MEDICINE | Facility: CLINIC | Age: 71
End: 2021-12-14
Payer: MEDICARE

## 2021-12-14 DIAGNOSIS — M17.0 PRIMARY OSTEOARTHRITIS OF BOTH KNEES: ICD-10-CM

## 2021-12-14 DIAGNOSIS — G89.4 CHRONIC PAIN SYNDROME: ICD-10-CM

## 2021-12-15 RX ORDER — HYDROCODONE BITARTRATE AND ACETAMINOPHEN 10; 325 MG/1; MG/1
1 TABLET ORAL EVERY 12 HOURS PRN
Qty: 60 TABLET | Refills: 0 | Status: SHIPPED | OUTPATIENT
Start: 2021-12-15 | End: 2022-01-14 | Stop reason: SDUPTHER

## 2021-12-22 ENCOUNTER — HOSPITAL ENCOUNTER (OUTPATIENT)
Dept: RADIOLOGY | Facility: HOSPITAL | Age: 71
Discharge: HOME | End: 2021-12-22
Attending: RADIOLOGY
Payer: MEDICARE

## 2021-12-22 DIAGNOSIS — Z85.118 HISTORY OF LUNG CANCER: ICD-10-CM

## 2021-12-22 PROCEDURE — 71250 CT THORAX DX C-: CPT | Mod: TC

## 2021-12-26 ENCOUNTER — PATIENT MESSAGE (OUTPATIENT)
Dept: ADMINISTRATIVE | Facility: OTHER | Age: 71
End: 2021-12-26
Payer: MEDICARE

## 2021-12-27 ENCOUNTER — OFFICE VISIT (OUTPATIENT)
Dept: RADIATION ONCOLOGY | Facility: CLINIC | Age: 71
End: 2021-12-27
Payer: MEDICARE

## 2021-12-27 DIAGNOSIS — Z85.118 HISTORY OF LUNG CANCER: Primary | ICD-10-CM

## 2021-12-27 PROCEDURE — 99213 PR OFFICE/OUTPT VISIT, EST, LEVL III, 20-29 MIN: ICD-10-PCS | Mod: 95,,, | Performed by: RADIOLOGY

## 2021-12-27 PROCEDURE — 4010F PR ACE/ARB THEARPY RXD/TAKEN: ICD-10-PCS | Mod: CPTII,95,, | Performed by: RADIOLOGY

## 2021-12-27 PROCEDURE — 99213 OFFICE O/P EST LOW 20 MIN: CPT | Mod: 95,,, | Performed by: RADIOLOGY

## 2021-12-27 PROCEDURE — 4010F ACE/ARB THERAPY RXD/TAKEN: CPT | Mod: CPTII,95,, | Performed by: RADIOLOGY

## 2022-01-04 ENCOUNTER — PES CALL (OUTPATIENT)
Dept: ADMINISTRATIVE | Facility: CLINIC | Age: 72
End: 2022-01-04
Payer: MEDICARE

## 2022-01-05 ENCOUNTER — PATIENT MESSAGE (OUTPATIENT)
Dept: INTERNAL MEDICINE | Facility: CLINIC | Age: 72
End: 2022-01-05
Payer: MEDICARE

## 2022-01-05 RX ORDER — PANTOPRAZOLE SODIUM 40 MG/1
40 TABLET, DELAYED RELEASE ORAL DAILY
Qty: 90 TABLET | Refills: 0 | Status: SHIPPED | OUTPATIENT
Start: 2022-01-05 | End: 2022-04-07 | Stop reason: SDUPTHER

## 2022-01-06 ENCOUNTER — PATIENT MESSAGE (OUTPATIENT)
Dept: INTERNAL MEDICINE | Facility: CLINIC | Age: 72
End: 2022-01-06
Payer: MEDICARE

## 2022-01-06 NOTE — TELEPHONE ENCOUNTER
No new care gaps identified.  Powered by [employer]. Reference number: [id number].   1/05/2022 6:29:42 PM CST

## 2022-01-14 ENCOUNTER — PATIENT MESSAGE (OUTPATIENT)
Dept: INTERNAL MEDICINE | Facility: CLINIC | Age: 72
End: 2022-01-14
Payer: MEDICARE

## 2022-01-14 DIAGNOSIS — G89.4 CHRONIC PAIN SYNDROME: ICD-10-CM

## 2022-01-14 DIAGNOSIS — M17.0 PRIMARY OSTEOARTHRITIS OF BOTH KNEES: ICD-10-CM

## 2022-01-14 RX ORDER — HYDROCODONE BITARTRATE AND ACETAMINOPHEN 10; 325 MG/1; MG/1
1 TABLET ORAL EVERY 12 HOURS PRN
Qty: 60 TABLET | Refills: 0 | Status: SHIPPED | OUTPATIENT
Start: 2022-01-14 | End: 2022-02-18 | Stop reason: SDUPTHER

## 2022-01-14 NOTE — TELEPHONE ENCOUNTER
No new care gaps identified.  Powered by [employer]. Reference number: [id number].   1/14/2022 2:33:43 PM CST

## 2022-02-09 ENCOUNTER — PATIENT MESSAGE (OUTPATIENT)
Dept: INTERNAL MEDICINE | Facility: CLINIC | Age: 72
End: 2022-02-09
Payer: MEDICARE

## 2022-02-14 ENCOUNTER — TELEPHONE (OUTPATIENT)
Dept: ENDOSCOPY | Facility: HOSPITAL | Age: 72
End: 2022-02-14
Payer: MEDICARE

## 2022-02-14 DIAGNOSIS — K86.2 PANCREATIC CYST: Primary | ICD-10-CM

## 2022-02-15 ENCOUNTER — LAB VISIT (OUTPATIENT)
Dept: LAB | Facility: OTHER | Age: 72
End: 2022-02-15
Attending: INTERNAL MEDICINE
Payer: MEDICARE

## 2022-02-15 DIAGNOSIS — E87.1 HYPONATREMIA: ICD-10-CM

## 2022-02-15 DIAGNOSIS — D50.0 IRON DEFICIENCY ANEMIA DUE TO CHRONIC BLOOD LOSS: ICD-10-CM

## 2022-02-15 DIAGNOSIS — N18.30 STAGE 3 CHRONIC KIDNEY DISEASE, UNSPECIFIED WHETHER STAGE 3A OR 3B CKD: ICD-10-CM

## 2022-02-15 DIAGNOSIS — E83.42 HYPOMAGNESEMIA: ICD-10-CM

## 2022-02-15 DIAGNOSIS — R73.9 HYPERGLYCEMIA: ICD-10-CM

## 2022-02-15 DIAGNOSIS — M81.8 OTHER OSTEOPOROSIS, UNSPECIFIED PATHOLOGICAL FRACTURE PRESENCE: ICD-10-CM

## 2022-02-15 DIAGNOSIS — E78.5 HYPERLIPIDEMIA, UNSPECIFIED HYPERLIPIDEMIA TYPE: Chronic | ICD-10-CM

## 2022-02-15 DIAGNOSIS — E53.8 VITAMIN B12 DEFICIENCY: ICD-10-CM

## 2022-02-15 LAB
ALBUMIN SERPL BCP-MCNC: 3.2 G/DL (ref 3.5–5.2)
ALP SERPL-CCNC: 101 U/L (ref 55–135)
ALT SERPL W/O P-5'-P-CCNC: 12 U/L (ref 10–44)
ANION GAP SERPL CALC-SCNC: 11 MMOL/L (ref 8–16)
AST SERPL-CCNC: 19 U/L (ref 10–40)
BASOPHILS # BLD AUTO: 0.02 K/UL (ref 0–0.2)
BASOPHILS NFR BLD: 0.2 % (ref 0–1.9)
BILIRUB SERPL-MCNC: 0.3 MG/DL (ref 0.1–1)
BUN SERPL-MCNC: 39 MG/DL (ref 8–23)
CALCIUM SERPL-MCNC: 8.8 MG/DL (ref 8.7–10.5)
CHLORIDE SERPL-SCNC: 99 MMOL/L (ref 95–110)
CHOLEST SERPL-MCNC: 123 MG/DL (ref 120–199)
CHOLEST/HDLC SERPL: 2.6 {RATIO} (ref 2–5)
CO2 SERPL-SCNC: 27 MMOL/L (ref 23–29)
CREAT SERPL-MCNC: 1.3 MG/DL (ref 0.5–1.4)
DIFFERENTIAL METHOD: ABNORMAL
EOSINOPHIL # BLD AUTO: 0.2 K/UL (ref 0–0.5)
EOSINOPHIL NFR BLD: 2 % (ref 0–8)
ERYTHROCYTE [DISTWIDTH] IN BLOOD BY AUTOMATED COUNT: 14.6 % (ref 11.5–14.5)
EST. GFR  (AFRICAN AMERICAN): 47 ML/MIN/1.73 M^2
EST. GFR  (NON AFRICAN AMERICAN): 41 ML/MIN/1.73 M^2
ESTIMATED AVG GLUCOSE: 97 MG/DL (ref 68–131)
FERRITIN SERPL-MCNC: 200 NG/ML (ref 20–300)
GLUCOSE SERPL-MCNC: 87 MG/DL (ref 70–110)
HBA1C MFR BLD: 5 % (ref 4–5.6)
HCT VFR BLD AUTO: 27.7 % (ref 37–48.5)
HDLC SERPL-MCNC: 48 MG/DL (ref 40–75)
HDLC SERPL: 39 % (ref 20–50)
HGB BLD-MCNC: 8.7 G/DL (ref 12–16)
IMM GRANULOCYTES # BLD AUTO: 0.05 K/UL (ref 0–0.04)
IMM GRANULOCYTES NFR BLD AUTO: 0.5 % (ref 0–0.5)
IRON SERPL-MCNC: 47 UG/DL (ref 30–160)
LDLC SERPL CALC-MCNC: 57.6 MG/DL (ref 63–159)
LYMPHOCYTES # BLD AUTO: 1.6 K/UL (ref 1–4.8)
LYMPHOCYTES NFR BLD: 16.9 % (ref 18–48)
MAGNESIUM SERPL-MCNC: 1.5 MG/DL (ref 1.6–2.6)
MCH RBC QN AUTO: 30 PG (ref 27–31)
MCHC RBC AUTO-ENTMCNC: 31.4 G/DL (ref 32–36)
MCV RBC AUTO: 96 FL (ref 82–98)
MONOCYTES # BLD AUTO: 0.7 K/UL (ref 0.3–1)
MONOCYTES NFR BLD: 7.4 % (ref 4–15)
NEUTROPHILS # BLD AUTO: 6.7 K/UL (ref 1.8–7.7)
NEUTROPHILS NFR BLD: 73 % (ref 38–73)
NONHDLC SERPL-MCNC: 75 MG/DL
NRBC BLD-RTO: 0 /100 WBC
PHOSPHATE SERPL-MCNC: 4.7 MG/DL (ref 2.7–4.5)
PLATELET # BLD AUTO: 268 K/UL (ref 150–450)
PMV BLD AUTO: 9.6 FL (ref 9.2–12.9)
POTASSIUM SERPL-SCNC: 5.3 MMOL/L (ref 3.5–5.1)
PROT SERPL-MCNC: 6.7 G/DL (ref 6–8.4)
RBC # BLD AUTO: 2.9 M/UL (ref 4–5.4)
SATURATED IRON: 17 % (ref 20–50)
SODIUM SERPL-SCNC: 137 MMOL/L (ref 136–145)
TOTAL IRON BINDING CAPACITY: 278 UG/DL (ref 250–450)
TRANSFERRIN SERPL-MCNC: 188 MG/DL (ref 200–375)
TRIGL SERPL-MCNC: 87 MG/DL (ref 30–150)
TSH SERPL DL<=0.005 MIU/L-ACNC: 1.91 UIU/ML (ref 0.4–4)
VIT B12 SERPL-MCNC: 751 PG/ML (ref 210–950)
WBC # BLD AUTO: 9.16 K/UL (ref 3.9–12.7)

## 2022-02-15 PROCEDURE — 36415 COLL VENOUS BLD VENIPUNCTURE: CPT | Performed by: INTERNAL MEDICINE

## 2022-02-15 PROCEDURE — 84466 ASSAY OF TRANSFERRIN: CPT | Performed by: INTERNAL MEDICINE

## 2022-02-15 PROCEDURE — 83735 ASSAY OF MAGNESIUM: CPT | Performed by: INTERNAL MEDICINE

## 2022-02-15 PROCEDURE — 84100 ASSAY OF PHOSPHORUS: CPT | Performed by: INTERNAL MEDICINE

## 2022-02-15 PROCEDURE — 82607 VITAMIN B-12: CPT | Performed by: INTERNAL MEDICINE

## 2022-02-15 PROCEDURE — 82728 ASSAY OF FERRITIN: CPT | Performed by: INTERNAL MEDICINE

## 2022-02-15 PROCEDURE — 85025 COMPLETE CBC W/AUTO DIFF WBC: CPT | Performed by: INTERNAL MEDICINE

## 2022-02-15 PROCEDURE — 80053 COMPREHEN METABOLIC PANEL: CPT | Performed by: INTERNAL MEDICINE

## 2022-02-15 PROCEDURE — 80061 LIPID PANEL: CPT | Performed by: INTERNAL MEDICINE

## 2022-02-15 PROCEDURE — 84443 ASSAY THYROID STIM HORMONE: CPT | Performed by: INTERNAL MEDICINE

## 2022-02-15 PROCEDURE — 83036 HEMOGLOBIN GLYCOSYLATED A1C: CPT | Performed by: INTERNAL MEDICINE

## 2022-02-16 NOTE — MR AVS SNAPSHOT
[facility]  [address]  [facility] [address]  Phone: [phone]  Fax: [phone]                  Nalini Varma   2017 10:30 AM   Office Visit    Description:  Female : 1950   Provider:  Roxanne Gee MD   Department:  [facility]           Diagnoses this Visit        Comments    Hyponatremia    -  Primary     Hypomagnesemia         Chronic obstructive pulmonary disease, unspecified COPD type         Anemia, unspecified type         SIADH (syndrome of inappropriate ADH production)                To Do List           Future Appointments        Provider Department Dept Phone    5/3/2017 10:00 AM Elina Wong MD [facility] - OB/GYN [address] [phone]    2017 1:30 PM [facility] USOP2 [facility] [phone]    2017 10:30 AM [facility] CT OP LIMIT 450 LBS [facility] [phone]      Goals (5 Years of Data)     None      [facility]     [facility] Nurse Care Line -  Assistance  Unless otherwise directed by your provider, please contact [facility], our nurse care line that is available for  assistance.     Registered nurses in the [facility] provide: appointment scheduling, clinical advisement, health education, and other advisory services.  Call: [phone] (toll free)               Medications                Verify that the below list of medications is an accurate representation of the medications you are currently taking.  If none reported, the list may be blank. If incorrect, please contact your healthcare provider. Carry this list with you in case of emergency.           Current Medications     albuterol 90 mcg/actuation inhaler Inhale 2 puffs into the lungs every 6 (six) hours as needed for Wheezing.    alendronate (FOSAMAX) 70 MG tablet Take 1 tablet (70 mg total) by mouth every 7 days.    amlodipine (NORVASC) 5 MG tablet Take 1 tablet (5 mg total) by mouth once daily.     "atorvastatin (LIPITOR) 40 MG tablet Take 1 tablet (40 mg total) by mouth once daily.    calcium carbonate (CALTRATE 600) 600 mg (1,500 mg) Tab Take 1 tablet (600 mg total) by mouth 2 (two) times daily with meals.    cyanocobalamin 1,000 mcg TbSR Take 1,000 mcg by mouth once daily.    famotidine (PEPCID) 20 MG tablet Take 1 tablet (20 mg total) by mouth 2 (two) times daily.    fluticasone-salmeterol (ADVAIR HFA) 115-21 mcg/actuation HFAA Inhale 2 puffs into the lungs every 12 (twelve) hours.    hydrocodone-acetaminophen 10-325mg (NORCO)  mg Tab Take 1 tablet by mouth every 12 (twelve) hours as needed for Pain.    lisinopril (PRINIVIL,ZESTRIL) 40 MG tablet Take 1 tablet (40 mg total) by mouth once daily.    magnesium oxide 400 mg Cap Take 1 capsule by mouth once daily.    umeclidinium 62.5 mcg/actuation DsDv Inhale 1 puff into the lungs once daily. Controller    walker (ULTRA-LIGHT ROLLATOR) Misc 1 each by Misc.(Non-Drug; Combo Route) route once daily.           Clinical Reference Information           Your Vitals Were     BP Pulse Height Weight SpO2 BMI    150/80 94 5' 6" (1.676 m) 96.3 kg (212 lb 4.9 oz) 99% 34.27 kg/m2      Blood Pressure          Most Recent Value    BP  (!)  150/80      Allergies as of 4/26/2017     No Known Allergies      Immunizations Administered on Date of Encounter - 4/26/2017     None      Orders Placed During Today's Visit      Normal Orders This Visit    Ambulatory consult to Pulmonology     Future Labs/Procedures Expected by Expires    Magnesium  4/26/2017 6/25/2018    Osmolality  4/26/2017 6/25/2018    Potassium, urine, random  4/26/2017 6/25/2018    Renal function panel  4/26/2017 6/25/2018    Sodium, urine, random  4/26/2017 4/28/2017    Chloride, urine, random  As directed 4/29/2017    Osmolality, urine  As directed 4/29/2017      Instructions    Please continue the fluid restriction  Labs today and in 4 wks.   Follow up with pulmonology       Smoking Cessation     If you would " like to quit smoking:   You may be eligible for free services if you are a [place] resident and started smoking cigarettes before September 1, 1988.  Call the Smoking Cessation Trust (SCT) toll free at (392) 036-2443 or (779) 657-0518.   Call 1-800-QUIT-NOW if you do not meet the above criteria.   Contact us via email: [email or web address]   View our website for more information: [email or web address]        Language Assistance Services     ATTENTION: Language assistance services are available, free of charge. Please call [phone].      ATENCIÓN: Si habla español, tiene a gao disposición servicios gratuitos de asistencia lingüística. Llame al [phone].     CHÚ Ý: N?u b?n nói Ti?ng Vi?t, có các d?ch v? h? tr? ngôn ng? mi?n phí dành cho b?n. G?i s? [phone].         [facility] complies with applicable Federal civil rights laws and does not discriminate on the basis of race, color, national origin, age, disability, or sex.         no

## 2022-02-18 ENCOUNTER — PATIENT MESSAGE (OUTPATIENT)
Dept: INTERNAL MEDICINE | Facility: CLINIC | Age: 72
End: 2022-02-18
Payer: MEDICARE

## 2022-02-18 DIAGNOSIS — M17.0 PRIMARY OSTEOARTHRITIS OF BOTH KNEES: ICD-10-CM

## 2022-02-18 DIAGNOSIS — G89.4 CHRONIC PAIN SYNDROME: ICD-10-CM

## 2022-02-18 RX ORDER — HYDROCODONE BITARTRATE AND ACETAMINOPHEN 10; 325 MG/1; MG/1
1 TABLET ORAL EVERY 12 HOURS PRN
Qty: 60 TABLET | Refills: 0 | Status: SHIPPED | OUTPATIENT
Start: 2022-02-18 | End: 2022-03-18 | Stop reason: SDUPTHER

## 2022-02-18 NOTE — TELEPHONE ENCOUNTER
No new care gaps identified.  Powered by [employer]. Reference number: [id number].   2/18/2022 7:24:12 AM CST

## 2022-02-28 DIAGNOSIS — N17.9 AKI (ACUTE KIDNEY INJURY): ICD-10-CM

## 2022-02-28 DIAGNOSIS — E87.5 HYPERKALEMIA: Primary | ICD-10-CM

## 2022-02-28 DIAGNOSIS — E83.42 HYPOMAGNESEMIA: ICD-10-CM

## 2022-02-28 NOTE — PROGRESS NOTES
Message sent to pt via my chart with results and updates to plan.     Please schedule bmp and magnesium - nonfasting this week or next week

## 2022-03-02 ENCOUNTER — TELEPHONE (OUTPATIENT)
Dept: INTERNAL MEDICINE | Facility: CLINIC | Age: 72
End: 2022-03-02
Payer: MEDICARE

## 2022-03-02 NOTE — TELEPHONE ENCOUNTER
----- Message from Yane Sahni MD sent at 2/28/2022  2:53 PM CST -----  Message sent to pt via my chart with results and updates to plan.     Please schedule bmp and magnesium - nonfasting this week or next week

## 2022-03-08 ENCOUNTER — PATIENT MESSAGE (OUTPATIENT)
Dept: INTERNAL MEDICINE | Facility: CLINIC | Age: 72
End: 2022-03-08
Payer: MEDICARE

## 2022-03-15 ENCOUNTER — PATIENT MESSAGE (OUTPATIENT)
Dept: ADMINISTRATIVE | Facility: OTHER | Age: 72
End: 2022-03-15
Payer: MEDICARE

## 2022-03-17 ENCOUNTER — TELEPHONE (OUTPATIENT)
Dept: ENDOSCOPY | Facility: HOSPITAL | Age: 72
End: 2022-03-17
Payer: MEDICARE

## 2022-03-17 NOTE — TELEPHONE ENCOUNTER
Parvez Ortez MD  You 51 minutes ago (3:20 PM)         Patient last seen using telemedicine on 10-Sep-2021.  At that time the patient was stable. Her estimated risk with the proposed surgery/procedure for an adverse perioperative cardiac outcome (MI, pulmonary edema, ventricular fibrillation or primary cardiac arrest, and complete heart block) is 0.9% (0.3%-2.1%) and for an adverse 30 day cardiac outcome (myocardial infarction, cardiac arrest, or death) is 6.0% (95% CI 4.9%-7.4%) (Revised Cardiac Risk Index [RCRI] Score = 1  based on the following risk factors: History of congestive heart failure). (Perioperative outcomes - Stanford at al Circ 1999; 100: 9614-5468; 30 day outcomes - Reid et al. Can J Cardiol 2017; 33:17-32).        Message text        You routed conversation to Parvez Ortez MD; [facility] Staff 5 hours ago (10:46 AM)     You 5 hours ago (10:46 AM)            Pt is scheduled for EUS 3/29/22. Is she low risk for this procedure? Please advise. Thanks.

## 2022-03-18 ENCOUNTER — TELEPHONE (OUTPATIENT)
Dept: ENDOSCOPY | Facility: HOSPITAL | Age: 72
End: 2022-03-18
Payer: MEDICARE

## 2022-03-18 ENCOUNTER — LAB VISIT (OUTPATIENT)
Dept: LAB | Facility: OTHER | Age: 72
End: 2022-03-18
Attending: INTERNAL MEDICINE
Payer: MEDICARE

## 2022-03-18 ENCOUNTER — OFFICE VISIT (OUTPATIENT)
Dept: INTERNAL MEDICINE | Facility: CLINIC | Age: 72
End: 2022-03-18
Attending: INTERNAL MEDICINE
Payer: MEDICARE

## 2022-03-18 ENCOUNTER — PATIENT MESSAGE (OUTPATIENT)
Dept: ENDOSCOPY | Facility: HOSPITAL | Age: 72
End: 2022-03-18
Payer: MEDICARE

## 2022-03-18 VITALS
HEIGHT: 67 IN | HEART RATE: 98 BPM | OXYGEN SATURATION: 96 % | SYSTOLIC BLOOD PRESSURE: 138 MMHG | BODY MASS INDEX: 34.16 KG/M2 | WEIGHT: 217.63 LBS | DIASTOLIC BLOOD PRESSURE: 62 MMHG

## 2022-03-18 DIAGNOSIS — E83.51 HYPOCALCEMIA: ICD-10-CM

## 2022-03-18 DIAGNOSIS — E87.5 HYPERKALEMIA: ICD-10-CM

## 2022-03-18 DIAGNOSIS — E55.9 VITAMIN D DEFICIENCY: ICD-10-CM

## 2022-03-18 DIAGNOSIS — J44.9 CHRONIC OBSTRUCTIVE PULMONARY DISEASE, UNSPECIFIED COPD TYPE: ICD-10-CM

## 2022-03-18 DIAGNOSIS — Z91.81 AT RISK FOR FALLS: ICD-10-CM

## 2022-03-18 DIAGNOSIS — E83.42 HYPOMAGNESEMIA: ICD-10-CM

## 2022-03-18 DIAGNOSIS — N18.30 STAGE 3 CHRONIC KIDNEY DISEASE, UNSPECIFIED WHETHER STAGE 3A OR 3B CKD: ICD-10-CM

## 2022-03-18 DIAGNOSIS — I10 ESSENTIAL HYPERTENSION: Chronic | ICD-10-CM

## 2022-03-18 DIAGNOSIS — F11.20 UNCOMPLICATED OPIOID DEPENDENCE: ICD-10-CM

## 2022-03-18 DIAGNOSIS — I50.42 CHRONIC COMBINED SYSTOLIC AND DIASTOLIC CHF (CONGESTIVE HEART FAILURE): Chronic | ICD-10-CM

## 2022-03-18 DIAGNOSIS — N17.9 AKI (ACUTE KIDNEY INJURY): Primary | ICD-10-CM

## 2022-03-18 DIAGNOSIS — Z79.899 OTHER LONG TERM (CURRENT) DRUG THERAPY: ICD-10-CM

## 2022-03-18 DIAGNOSIS — Z85.118 HISTORY OF LUNG CANCER: ICD-10-CM

## 2022-03-18 DIAGNOSIS — M81.8 OTHER OSTEOPOROSIS, UNSPECIFIED PATHOLOGICAL FRACTURE PRESENCE: ICD-10-CM

## 2022-03-18 DIAGNOSIS — D50.0 IRON DEFICIENCY ANEMIA DUE TO CHRONIC BLOOD LOSS: ICD-10-CM

## 2022-03-18 DIAGNOSIS — G89.29 ENCOUNTER FOR CHRONIC PAIN MANAGEMENT: ICD-10-CM

## 2022-03-18 DIAGNOSIS — G89.4 CHRONIC PAIN SYNDROME: ICD-10-CM

## 2022-03-18 DIAGNOSIS — J96.11 CHRONIC HYPOXEMIC RESPIRATORY FAILURE: ICD-10-CM

## 2022-03-18 DIAGNOSIS — S32.030D CLOSED COMPRESSION FRACTURE OF L3 LUMBAR VERTEBRA WITH ROUTINE HEALING, SUBSEQUENT ENCOUNTER: ICD-10-CM

## 2022-03-18 DIAGNOSIS — E53.8 VITAMIN B12 DEFICIENCY: ICD-10-CM

## 2022-03-18 DIAGNOSIS — N17.9 AKI (ACUTE KIDNEY INJURY): ICD-10-CM

## 2022-03-18 DIAGNOSIS — E78.5 HYPERLIPIDEMIA, UNSPECIFIED HYPERLIPIDEMIA TYPE: Chronic | ICD-10-CM

## 2022-03-18 DIAGNOSIS — M17.0 PRIMARY OSTEOARTHRITIS OF BOTH KNEES: ICD-10-CM

## 2022-03-18 DIAGNOSIS — F17.200 TOBACCO DEPENDENCE: ICD-10-CM

## 2022-03-18 DIAGNOSIS — Z12.11 SCREENING FOR COLON CANCER: ICD-10-CM

## 2022-03-18 LAB
25(OH)D3+25(OH)D2 SERPL-MCNC: 51 NG/ML (ref 30–96)
ALBUMIN SERPL BCP-MCNC: 3.7 G/DL (ref 3.5–5.2)
ALP SERPL-CCNC: 85 U/L (ref 55–135)
ALT SERPL W/O P-5'-P-CCNC: 14 U/L (ref 10–44)
AMPHET+METHAMPHET UR QL: NEGATIVE
ANION GAP SERPL CALC-SCNC: 15 MMOL/L (ref 8–16)
ANION GAP SERPL CALC-SCNC: 15 MMOL/L (ref 8–16)
AST SERPL-CCNC: 16 U/L (ref 10–40)
BARBITURATES UR QL SCN>200 NG/ML: NEGATIVE
BASOPHILS # BLD AUTO: 0.02 K/UL (ref 0–0.2)
BASOPHILS NFR BLD: 0.2 % (ref 0–1.9)
BENZODIAZ UR QL SCN>200 NG/ML: NEGATIVE
BILIRUB SERPL-MCNC: 0.4 MG/DL (ref 0.1–1)
BUN SERPL-MCNC: 50 MG/DL (ref 8–23)
BUN SERPL-MCNC: 50 MG/DL (ref 8–23)
BZE UR QL SCN: NEGATIVE
CALCIUM SERPL-MCNC: 10.1 MG/DL (ref 8.7–10.5)
CALCIUM SERPL-MCNC: 10.1 MG/DL (ref 8.7–10.5)
CANNABINOIDS UR QL SCN: NEGATIVE
CHLORIDE SERPL-SCNC: 99 MMOL/L (ref 95–110)
CHLORIDE SERPL-SCNC: 99 MMOL/L (ref 95–110)
CO2 SERPL-SCNC: 22 MMOL/L (ref 23–29)
CO2 SERPL-SCNC: 22 MMOL/L (ref 23–29)
CREAT SERPL-MCNC: 1.9 MG/DL (ref 0.5–1.4)
CREAT SERPL-MCNC: 1.9 MG/DL (ref 0.5–1.4)
CREAT UR-MCNC: 20 MG/DL (ref 15–325)
DIFFERENTIAL METHOD: ABNORMAL
EOSINOPHIL # BLD AUTO: 0.1 K/UL (ref 0–0.5)
EOSINOPHIL NFR BLD: 1.2 % (ref 0–8)
ERYTHROCYTE [DISTWIDTH] IN BLOOD BY AUTOMATED COUNT: 14.7 % (ref 11.5–14.5)
EST. GFR  (AFRICAN AMERICAN): 30 ML/MIN/1.73 M^2
EST. GFR  (AFRICAN AMERICAN): 30 ML/MIN/1.73 M^2
EST. GFR  (NON AFRICAN AMERICAN): 26 ML/MIN/1.73 M^2
EST. GFR  (NON AFRICAN AMERICAN): 26 ML/MIN/1.73 M^2
ETHANOL UR-MCNC: <10 MG/DL
FERRITIN SERPL-MCNC: 142 NG/ML (ref 20–300)
GLUCOSE SERPL-MCNC: 105 MG/DL (ref 70–110)
GLUCOSE SERPL-MCNC: 105 MG/DL (ref 70–110)
HCT VFR BLD AUTO: 29.8 % (ref 37–48.5)
HGB BLD-MCNC: 9.6 G/DL (ref 12–16)
IMM GRANULOCYTES # BLD AUTO: 0.04 K/UL (ref 0–0.04)
IMM GRANULOCYTES NFR BLD AUTO: 0.4 % (ref 0–0.5)
IRON SERPL-MCNC: 44 UG/DL (ref 30–160)
LYMPHOCYTES # BLD AUTO: 1.7 K/UL (ref 1–4.8)
LYMPHOCYTES NFR BLD: 18 % (ref 18–48)
MAGNESIUM SERPL-MCNC: 2 MG/DL (ref 1.6–2.6)
MCH RBC QN AUTO: 30.3 PG (ref 27–31)
MCHC RBC AUTO-ENTMCNC: 32.2 G/DL (ref 32–36)
MCV RBC AUTO: 94 FL (ref 82–98)
METHADONE UR QL SCN>300 NG/ML: NEGATIVE
MONOCYTES # BLD AUTO: 0.9 K/UL (ref 0.3–1)
MONOCYTES NFR BLD: 9.4 % (ref 4–15)
NEUTROPHILS # BLD AUTO: 6.6 K/UL (ref 1.8–7.7)
NEUTROPHILS NFR BLD: 70.8 % (ref 38–73)
NRBC BLD-RTO: 0 /100 WBC
OPIATES UR QL SCN: ABNORMAL
PCP UR QL SCN>25 NG/ML: NEGATIVE
PLATELET # BLD AUTO: 240 K/UL (ref 150–450)
PMV BLD AUTO: 10.1 FL (ref 9.2–12.9)
POTASSIUM SERPL-SCNC: 4.5 MMOL/L (ref 3.5–5.1)
POTASSIUM SERPL-SCNC: 4.5 MMOL/L (ref 3.5–5.1)
PROT SERPL-MCNC: 7.3 G/DL (ref 6–8.4)
PTH-INTACT SERPL-MCNC: 63 PG/ML (ref 9–77)
RBC # BLD AUTO: 3.17 M/UL (ref 4–5.4)
SATURATED IRON: 13 % (ref 20–50)
SODIUM SERPL-SCNC: 136 MMOL/L (ref 136–145)
SODIUM SERPL-SCNC: 136 MMOL/L (ref 136–145)
TOTAL IRON BINDING CAPACITY: 327 UG/DL (ref 250–450)
TOXICOLOGY INFORMATION: ABNORMAL
TRANSFERRIN SERPL-MCNC: 221 MG/DL (ref 200–375)
WBC # BLD AUTO: 9.35 K/UL (ref 3.9–12.7)

## 2022-03-18 PROCEDURE — 1160F RVW MEDS BY RX/DR IN RCRD: CPT | Mod: CPTII,S$GLB,, | Performed by: INTERNAL MEDICINE

## 2022-03-18 PROCEDURE — 1125F AMNT PAIN NOTED PAIN PRSNT: CPT | Mod: CPTII,S$GLB,, | Performed by: INTERNAL MEDICINE

## 2022-03-18 PROCEDURE — 1160F PR REVIEW ALL MEDS BY PRESCRIBER/CLIN PHARMACIST DOCUMENTED: ICD-10-PCS | Mod: CPTII,S$GLB,, | Performed by: INTERNAL MEDICINE

## 2022-03-18 PROCEDURE — 3044F PR MOST RECENT HEMOGLOBIN A1C LEVEL <7.0%: ICD-10-PCS | Mod: CPTII,S$GLB,, | Performed by: INTERNAL MEDICINE

## 2022-03-18 PROCEDURE — 99214 OFFICE O/P EST MOD 30 MIN: CPT | Mod: S$GLB,,, | Performed by: INTERNAL MEDICINE

## 2022-03-18 PROCEDURE — 82728 ASSAY OF FERRITIN: CPT | Performed by: INTERNAL MEDICINE

## 2022-03-18 PROCEDURE — 99999 PR PBB SHADOW E&M-EST. PATIENT-LVL V: CPT | Mod: PBBFAC,,, | Performed by: INTERNAL MEDICINE

## 2022-03-18 PROCEDURE — 83970 ASSAY OF PARATHORMONE: CPT | Performed by: INTERNAL MEDICINE

## 2022-03-18 PROCEDURE — 3288F FALL RISK ASSESSMENT DOCD: CPT | Mod: CPTII,S$GLB,, | Performed by: INTERNAL MEDICINE

## 2022-03-18 PROCEDURE — 3078F PR MOST RECENT DIASTOLIC BLOOD PRESSURE < 80 MM HG: ICD-10-PCS | Mod: CPTII,S$GLB,, | Performed by: INTERNAL MEDICINE

## 2022-03-18 PROCEDURE — 3075F SYST BP GE 130 - 139MM HG: CPT | Mod: CPTII,S$GLB,, | Performed by: INTERNAL MEDICINE

## 2022-03-18 PROCEDURE — 3044F HG A1C LEVEL LT 7.0%: CPT | Mod: CPTII,S$GLB,, | Performed by: INTERNAL MEDICINE

## 2022-03-18 PROCEDURE — 3008F PR BODY MASS INDEX (BMI) DOCUMENTED: ICD-10-PCS | Mod: CPTII,S$GLB,, | Performed by: INTERNAL MEDICINE

## 2022-03-18 PROCEDURE — 99499 RISK ADDL DX/OHS AUDIT: ICD-10-PCS | Mod: S$GLB,,, | Performed by: INTERNAL MEDICINE

## 2022-03-18 PROCEDURE — 3288F PR FALLS RISK ASSESSMENT DOCUMENTED: ICD-10-PCS | Mod: CPTII,S$GLB,, | Performed by: INTERNAL MEDICINE

## 2022-03-18 PROCEDURE — 3075F PR MOST RECENT SYSTOLIC BLOOD PRESS GE 130-139MM HG: ICD-10-PCS | Mod: CPTII,S$GLB,, | Performed by: INTERNAL MEDICINE

## 2022-03-18 PROCEDURE — 99499 UNLISTED E&M SERVICE: CPT | Mod: S$GLB,,, | Performed by: INTERNAL MEDICINE

## 2022-03-18 PROCEDURE — 99214 PR OFFICE/OUTPT VISIT, EST, LEVL IV, 30-39 MIN: ICD-10-PCS | Mod: S$GLB,,, | Performed by: INTERNAL MEDICINE

## 2022-03-18 PROCEDURE — 83735 ASSAY OF MAGNESIUM: CPT | Performed by: INTERNAL MEDICINE

## 2022-03-18 PROCEDURE — 36415 COLL VENOUS BLD VENIPUNCTURE: CPT | Performed by: INTERNAL MEDICINE

## 2022-03-18 PROCEDURE — 4010F PR ACE/ARB THEARPY RXD/TAKEN: ICD-10-PCS | Mod: CPTII,S$GLB,, | Performed by: INTERNAL MEDICINE

## 2022-03-18 PROCEDURE — 99999 PR PBB SHADOW E&M-EST. PATIENT-LVL V: ICD-10-PCS | Mod: PBBFAC,,, | Performed by: INTERNAL MEDICINE

## 2022-03-18 PROCEDURE — 80307 DRUG TEST PRSMV CHEM ANLYZR: CPT | Performed by: INTERNAL MEDICINE

## 2022-03-18 PROCEDURE — 1159F PR MEDICATION LIST DOCUMENTED IN MEDICAL RECORD: ICD-10-PCS | Mod: CPTII,S$GLB,, | Performed by: INTERNAL MEDICINE

## 2022-03-18 PROCEDURE — 1100F PTFALLS ASSESS-DOCD GE2>/YR: CPT | Mod: CPTII,S$GLB,, | Performed by: INTERNAL MEDICINE

## 2022-03-18 PROCEDURE — 1125F PR PAIN SEVERITY QUANTIFIED, PAIN PRESENT: ICD-10-PCS | Mod: CPTII,S$GLB,, | Performed by: INTERNAL MEDICINE

## 2022-03-18 PROCEDURE — 4010F ACE/ARB THERAPY RXD/TAKEN: CPT | Mod: CPTII,S$GLB,, | Performed by: INTERNAL MEDICINE

## 2022-03-18 PROCEDURE — 82306 VITAMIN D 25 HYDROXY: CPT | Performed by: INTERNAL MEDICINE

## 2022-03-18 PROCEDURE — 1100F PR PT FALLS ASSESS DOC 2+ FALLS/FALL W/INJURY/YR: ICD-10-PCS | Mod: CPTII,S$GLB,, | Performed by: INTERNAL MEDICINE

## 2022-03-18 PROCEDURE — 84466 ASSAY OF TRANSFERRIN: CPT | Performed by: INTERNAL MEDICINE

## 2022-03-18 PROCEDURE — 80053 COMPREHEN METABOLIC PANEL: CPT | Performed by: INTERNAL MEDICINE

## 2022-03-18 PROCEDURE — 3078F DIAST BP <80 MM HG: CPT | Mod: CPTII,S$GLB,, | Performed by: INTERNAL MEDICINE

## 2022-03-18 PROCEDURE — 1159F MED LIST DOCD IN RCRD: CPT | Mod: CPTII,S$GLB,, | Performed by: INTERNAL MEDICINE

## 2022-03-18 PROCEDURE — 3008F BODY MASS INDEX DOCD: CPT | Mod: CPTII,S$GLB,, | Performed by: INTERNAL MEDICINE

## 2022-03-18 PROCEDURE — 85025 COMPLETE CBC W/AUTO DIFF WBC: CPT | Performed by: INTERNAL MEDICINE

## 2022-03-18 RX ORDER — HYDROCODONE BITARTRATE AND ACETAMINOPHEN 10; 325 MG/1; MG/1
1 TABLET ORAL EVERY 12 HOURS PRN
Qty: 60 TABLET | Refills: 0 | Status: SHIPPED | OUTPATIENT
Start: 2022-03-18 | End: 2022-04-18 | Stop reason: SDUPTHER

## 2022-03-18 RX ORDER — VITAMIN B COMPLEX
1 CAPSULE ORAL DAILY
Status: ON HOLD | COMMUNITY
End: 2023-06-15

## 2022-03-18 NOTE — TELEPHONE ENCOUNTER
Received request to schedule patient for EUS on 3/29/22 at 12:30 Spoke with Patient. Reviewed medical history and medications. Instructed on procedure and prep. Patient verbalized understanding of instructions.copy of instructions to patient via portal.
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
## 2022-03-21 PROBLEM — K57.92 DIVERTICULITIS: Status: RESOLVED | Noted: 2021-07-29 | Resolved: 2022-03-21

## 2022-03-21 PROBLEM — N39.0 ENTEROCOCCUS UTI: Status: RESOLVED | Noted: 2021-04-30 | Resolved: 2022-03-21

## 2022-03-21 PROBLEM — B95.2 ENTEROCOCCUS UTI: Status: RESOLVED | Noted: 2021-04-30 | Resolved: 2022-03-21

## 2022-03-22 ENCOUNTER — PATIENT MESSAGE (OUTPATIENT)
Dept: INTERNAL MEDICINE | Facility: CLINIC | Age: 72
End: 2022-03-22
Payer: MEDICARE

## 2022-03-22 ENCOUNTER — SPECIALTY PHARMACY (OUTPATIENT)
Dept: PHARMACY | Facility: CLINIC | Age: 72
End: 2022-03-22
Payer: MEDICARE

## 2022-03-23 ENCOUNTER — SPECIALTY PHARMACY (OUTPATIENT)
Dept: PHARMACY | Facility: CLINIC | Age: 72
End: 2022-03-23
Payer: MEDICARE

## 2022-03-23 NOTE — TELEPHONE ENCOUNTER
Specialty Pharmacy - Refill Coordination    Specialty Medication Orders Linked to Encounter    Flowsheet Row Most Recent Value   Medication #1 denosumab (PROLIA) 60 mg/mL Syrg (Order#[id number], Rx#[id number])        Patients Calcium 10.1 (WNL). Ok to resume Prolia. Patient taking calcium supplements and educated on signs / symptoms of hypocalcemia.     Refill Questions - Documented Responses    Flowsheet Row Most Recent Value   Patient Availability and HIPAA Verification    Does patient want to proceed with activity? Yes   HIPAA/medical authority confirmed? Yes   Relationship to patient of person spoken to? Self   Refill Screening Questions    Changes to allergies? No   Changes to medications? No   New conditions since last clinic visit? No   Unplanned office visit, urgent care, ED, or hospital admission in the last 4 weeks? No   How does patient/caregiver feel medication is working? Good   Financial problems or insurance changes? No   How many doses of your specialty medications were missed in the last 4 weeks? 0   Would patient like to speak to a pharmacist? No   When does the patient need to receive the medication? 04/14/22   Refill Delivery Questions    How will the patient receive the medication? Prescription   When does the patient need to receive the medication? 04/14/22   Shipping Address Home   Address in [facility] confirmed and updated if neccessary? Yes   Expected Copay ($) 0   Is the patient able to afford the medication copay? Yes   Payment Method zero copay   Days supply of Refill 180   Supplies needed? No supplies needed   Refill activity completed? Yes   Refill activity plan Refill scheduled   Shipment/Pickup Date: 03/30/22          Current Outpatient Medications   Medication Sig    acetaminophen (TYLENOL) 500 MG tablet Take 2 tablets (1,000 mg total) by mouth every 8 (eight) hours as needed.    albuterol (VENTOLIN HFA) 90 mcg/actuation inhaler inhale 1-2 puffs as needed every 6 hours for  wheezing and shortness of breath (Patient taking differently: inhale 1-2 puffs as needed every 6 hours for wheezing and shortness of breath)    atorvastatin (LIPITOR) 40 MG tablet TAKE 1 TABLET BY MOUTH EVERY DAILY    b complex vitamins capsule Take 1 capsule by mouth once daily.    calcium carbonate (OS-SANDRINE) 500 mg calcium (1,250 mg) tablet Take 2 tablets (1,000 mg total) by mouth 2 (two) times daily.    cyanocobalamin, vitamin B-12, 1,000 mcg TbSR Take 1,000 mcg by mouth once daily.    denosumab (PROLIA) 60 mg/mL Syrg Inject 1 mL (60 mg total) into the skin every 6 (six) months.    fluticasone (FLONASE) 50 mcg/actuation nasal spray 1 spray by Each Nare route 2 (two) times daily as needed for Rhinitis.    fluticasone propion-salmeterol 115-21 mcg/dose (ADVAIR HFA) 115-21 mcg/actuation HFAA inhaler Inhale 2 puffs into the lungs every 12 (twelve) hours.    gabapentin (NEURONTIN) 300 MG capsule Take 1 capsule (300 mg total) by mouth 3 (three) times daily.    guaiFENesin (MUCINEX) 600 mg 12 hr tablet Take 1,200 mg by mouth 2 (two) times daily as needed for Congestion.    HYDROcodone-acetaminophen (NORCO)  mg per tablet Take 1 tablet by mouth every 12 (twelve) hours as needed for Pain.    losartan (COZAAR) 25 MG tablet Take 1 tablet (25 mg total) by mouth once daily.    magnesium oxide (MAG-OX) 400 mg (241.3 mg magnesium) tablet Take 1 tablet by mouth every 12 (twelve) hours.    metoprolol succinate (TOPROL-XL) 25 MG 24 hr tablet Take 2 tablets (50 mg total) by mouth once daily.    multivit-min/iron/folic/lutein (CENTRUM SILVER WOMEN ORAL) Take 1 tablet by mouth once daily.    pantoprazole (PROTONIX) 40 MG tablet Take 1 tablet (40 mg total) by mouth once daily.    torsemide (DEMADEX) 20 MG Tab Take 1 tablet (20 mg total) by mouth once daily. (Patient taking differently: Take 20 mg by mouth once daily. Pt has been taking 2 BID daily for last few days)    umeclidinium (INCRUSE ELLIPTA) 62.5  mcg/actuation inhalation capsule Inhale 1 puff into the lungs once daily. Controller    vitamin D (VITAMIN D3) 1000 units Tab Take 1 tablet (1,000 Units total) by mouth once daily.    ZINC ORAL Take by mouth.   Last reviewed on 3/21/2022  6:46 AM by Yane Sahni MD    Review of patient's allergies indicates:   Allergen Reactions    Wellbutrin [bupropion hcl] Other (See Comments)     Hyponatremia and hypomagnesia     Zoloft [sertraline] Other (See Comments)     Hyponatremia and hypomagnesia     Bananas [banana]      Emesis, and stomach cramps    Last reviewed on  3/21/2022 6:46 AM by Yane Sahni      Tasks added this encounter   9/5/2022 - Refill Call (Auto Added)   Tasks due within next 3 months   No tasks due.     Alfredo Piper, PharmD  [facility]  [address] [place] [address]  Phone: [phone]  Fax: [phone]

## 2022-03-25 ENCOUNTER — TELEPHONE (OUTPATIENT)
Dept: ENDOSCOPY | Facility: HOSPITAL | Age: 72
End: 2022-03-25
Payer: MEDICARE

## 2022-03-25 NOTE — TELEPHONE ENCOUNTER
Rescheduled patient  for EUS on 4/25/22 at 12:3m. Spoke with Patient. Reviewed medical history and medications. Instructed on procedure and prep. Patient verbalized understanding of instructions. copy of instructions sent to patient via portal.  Pt states she needs to use medical transport from insurance to go home as she doesn't have family or friend that can take her home. Pending ok to use per Dr. Cheema.

## 2022-03-25 NOTE — TELEPHONE ENCOUNTER
Patient states she needs to have medical transport take her home from her insurance and does not have family or friend that can drive her home. Pt states she has done this before. Ok to use medical transport? Please advise.

## 2022-03-29 ENCOUNTER — PATIENT OUTREACH (OUTPATIENT)
Dept: INTERNAL MEDICINE | Facility: CLINIC | Age: 72
End: 2022-03-29
Payer: MEDICARE

## 2022-04-01 ENCOUNTER — TELEPHONE (OUTPATIENT)
Dept: ENDOSCOPY | Facility: HOSPITAL | Age: 72
End: 2022-04-01
Payer: MEDICARE

## 2022-04-01 NOTE — TELEPHONE ENCOUNTER
Yes   R             Previous Messages       ----- Message -----   From: Geena Bennett RN   Sent: 3/30/2022   8:48 AM CDT   To: Helio Cheema MD     Patient states she needs to have medical transport take her home from her insurance and does not have family or friend that can drive her home. Pt states she has done this before. Ok to use medical transport? Please advise.

## 2022-04-06 ENCOUNTER — PES CALL (OUTPATIENT)
Dept: ADMINISTRATIVE | Facility: CLINIC | Age: 72
End: 2022-04-06
Payer: MEDICARE

## 2022-04-07 DIAGNOSIS — K57.90 DIVERTICULOSIS: Primary | ICD-10-CM

## 2022-04-07 NOTE — TELEPHONE ENCOUNTER
No new care gaps identified.  Powered by [employer]. Reference number: [id number].   4/07/2022 5:01:54 PM CDT

## 2022-04-08 ENCOUNTER — PATIENT MESSAGE (OUTPATIENT)
Dept: INTERNAL MEDICINE | Facility: CLINIC | Age: 72
End: 2022-04-08
Payer: MEDICARE

## 2022-04-08 RX ORDER — PANTOPRAZOLE SODIUM 40 MG/1
40 TABLET, DELAYED RELEASE ORAL DAILY
Qty: 90 TABLET | Refills: 0 | Status: SHIPPED | OUTPATIENT
Start: 2022-04-08 | End: 2022-07-08 | Stop reason: SDUPTHER

## 2022-04-08 NOTE — TELEPHONE ENCOUNTER
Refill Routing Note   Medication(s) are not appropriate for processing by [facility] for the following reason(s):      - Required indication for medication not on problem list                Medication reconciliation completed: No     Appointments  past 12m or future 3m with PCP    Date Provider   Last Visit   3/18/2022 Yane Sahni MD   Next Visit   4/29/2022 Yane Sahni MD

## 2022-04-13 ENCOUNTER — PATIENT MESSAGE (OUTPATIENT)
Dept: INTERNAL MEDICINE | Facility: CLINIC | Age: 72
End: 2022-04-13
Payer: MEDICARE

## 2022-04-14 ENCOUNTER — HOSPITAL ENCOUNTER (OUTPATIENT)
Dept: RADIOLOGY | Facility: OTHER | Age: 72
Discharge: HOME | End: 2022-04-14
Attending: INTERNAL MEDICINE
Payer: MEDICARE

## 2022-04-14 ENCOUNTER — CLINICAL SUPPORT (OUTPATIENT)
Dept: INTERNAL MEDICINE | Facility: CLINIC | Age: 72
End: 2022-04-14
Payer: MEDICARE

## 2022-04-14 DIAGNOSIS — M81.8 OTHER OSTEOPOROSIS, UNSPECIFIED PATHOLOGICAL FRACTURE PRESENCE: ICD-10-CM

## 2022-04-14 PROCEDURE — 77080 DEXA BONE DENSITY SPINE HIP: ICD-10-PCS | Mod: 26,,, | Performed by: RADIOLOGY

## 2022-04-14 PROCEDURE — 77080 DXA BONE DENSITY AXIAL: CPT | Mod: 26,,, | Performed by: RADIOLOGY

## 2022-04-14 PROCEDURE — 77080 DXA BONE DENSITY AXIAL: CPT | Mod: TC

## 2022-04-14 NOTE — PROGRESS NOTES
Patient was given information for the Prolia injection. Prior to administration, the patient's allergies were reviewed. The area of injection was identified in the abdomen. This area was cleaned with alcohol. 60mg of Prolia was placed into the subcutaneous tissue. The injection site was dressed with a bandage. Patient experienced no complications and was discharged in stable condition. Patient was given aftercare instructions. Prolia Lot: [id number] Exp: 05/24

## 2022-04-18 ENCOUNTER — PATIENT MESSAGE (OUTPATIENT)
Dept: ADMINISTRATIVE | Facility: OTHER | Age: 72
End: 2022-04-18
Payer: MEDICARE

## 2022-04-18 ENCOUNTER — PATIENT MESSAGE (OUTPATIENT)
Dept: INTERNAL MEDICINE | Facility: CLINIC | Age: 72
End: 2022-04-18
Payer: MEDICARE

## 2022-04-18 DIAGNOSIS — M17.0 PRIMARY OSTEOARTHRITIS OF BOTH KNEES: ICD-10-CM

## 2022-04-18 DIAGNOSIS — G89.4 CHRONIC PAIN SYNDROME: ICD-10-CM

## 2022-04-18 RX ORDER — HYDROCODONE BITARTRATE AND ACETAMINOPHEN 10; 325 MG/1; MG/1
1 TABLET ORAL EVERY 12 HOURS PRN
Qty: 60 TABLET | Refills: 0 | Status: SHIPPED | OUTPATIENT
Start: 2022-04-18 | End: 2022-05-19 | Stop reason: SDUPTHER

## 2022-04-18 NOTE — TELEPHONE ENCOUNTER
No new care gaps identified.  Powered by [employer]. Reference number: [id number].   4/18/2022 2:04:16 PM CDT

## 2022-04-21 ENCOUNTER — PATIENT OUTREACH (OUTPATIENT)
Dept: INTERNAL MEDICINE | Facility: CLINIC | Age: 72
End: 2022-04-21
Payer: MEDICARE

## 2022-04-25 ENCOUNTER — HOSPITAL ENCOUNTER (OUTPATIENT)
Facility: HOSPITAL | Age: 72
Discharge: HOME | End: 2022-04-25
Attending: INTERNAL MEDICINE | Admitting: INTERNAL MEDICINE
Payer: MEDICARE

## 2022-04-25 ENCOUNTER — ANESTHESIA EVENT (OUTPATIENT)
Dept: ENDOSCOPY | Facility: HOSPITAL | Age: 72
End: 2022-04-25
Payer: MEDICARE

## 2022-04-25 ENCOUNTER — ANESTHESIA (OUTPATIENT)
Dept: ENDOSCOPY | Facility: HOSPITAL | Age: 72
End: 2022-04-25
Payer: MEDICARE

## 2022-04-25 VITALS
DIASTOLIC BLOOD PRESSURE: 76 MMHG | SYSTOLIC BLOOD PRESSURE: 162 MMHG | OXYGEN SATURATION: 100 % | RESPIRATION RATE: 16 BRPM | TEMPERATURE: 98 F | HEART RATE: 75 BPM | HEIGHT: 66 IN | WEIGHT: 210 LBS | BODY MASS INDEX: 33.75 KG/M2

## 2022-04-25 DIAGNOSIS — K86.2 PANCREAS CYST: Primary | ICD-10-CM

## 2022-04-25 PROCEDURE — 37000008 HC ANESTHESIA 1ST 15 MINUTES: Performed by: INTERNAL MEDICINE

## 2022-04-25 PROCEDURE — 63600175 PHARM REV CODE 636 W HCPCS: Performed by: NURSE ANESTHETIST, CERTIFIED REGISTERED

## 2022-04-25 PROCEDURE — 25000003 PHARM REV CODE 250: Performed by: NURSE ANESTHETIST, CERTIFIED REGISTERED

## 2022-04-25 PROCEDURE — 43259 EGD US EXAM DUODENUM/JEJUNUM: CPT | Performed by: INTERNAL MEDICINE

## 2022-04-25 PROCEDURE — 25000003 PHARM REV CODE 250: Performed by: INTERNAL MEDICINE

## 2022-04-25 PROCEDURE — D9220A PRA ANESTHESIA: ICD-10-PCS | Mod: ANES,,, | Performed by: ANESTHESIOLOGY

## 2022-04-25 PROCEDURE — D9220A PRA ANESTHESIA: ICD-10-PCS | Mod: CRNA,,, | Performed by: NURSE ANESTHETIST, CERTIFIED REGISTERED

## 2022-04-25 PROCEDURE — 37000009 HC ANESTHESIA EA ADD 15 MINS: Performed by: INTERNAL MEDICINE

## 2022-04-25 PROCEDURE — D9220A PRA ANESTHESIA: Mod: ANES,,, | Performed by: ANESTHESIOLOGY

## 2022-04-25 PROCEDURE — 43259 EGD US EXAM DUODENUM/JEJUNUM: CPT | Mod: ,,, | Performed by: INTERNAL MEDICINE

## 2022-04-25 PROCEDURE — D9220A PRA ANESTHESIA: Mod: CRNA,,, | Performed by: NURSE ANESTHETIST, CERTIFIED REGISTERED

## 2022-04-25 PROCEDURE — 43259 PR ENDOSCOPIC ULTRASOUND EXAM: ICD-10-PCS | Mod: ,,, | Performed by: INTERNAL MEDICINE

## 2022-04-25 RX ORDER — SODIUM CHLORIDE 0.9 % (FLUSH) 0.9 %
3 SYRINGE (ML) INJECTION
Status: DISCONTINUED | OUTPATIENT
Start: 2022-04-25 | End: 2022-04-25 | Stop reason: HOSPADM

## 2022-04-25 RX ORDER — LORAZEPAM 2 MG/ML
0.25 INJECTION INTRAMUSCULAR ONCE AS NEEDED
Status: CANCELLED | OUTPATIENT
Start: 2022-04-25 | End: 2033-09-21

## 2022-04-25 RX ORDER — LIDOCAINE HYDROCHLORIDE 20 MG/ML
INJECTION INTRAVENOUS
Status: DISCONTINUED | OUTPATIENT
Start: 2022-04-25 | End: 2022-04-25

## 2022-04-25 RX ORDER — SODIUM CHLORIDE 9 MG/ML
INJECTION, SOLUTION INTRAVENOUS CONTINUOUS
Status: CANCELLED | OUTPATIENT
Start: 2022-04-25

## 2022-04-25 RX ORDER — SODIUM CHLORIDE 0.9 % (FLUSH) 0.9 %
10 SYRINGE (ML) INJECTION
Status: DISCONTINUED | OUTPATIENT
Start: 2022-04-25 | End: 2022-04-25 | Stop reason: HOSPADM

## 2022-04-25 RX ORDER — PROPOFOL 10 MG/ML
VIAL (ML) INTRAVENOUS
Status: DISCONTINUED | OUTPATIENT
Start: 2022-04-25 | End: 2022-04-25

## 2022-04-25 RX ORDER — HYDROMORPHONE HYDROCHLORIDE 1 MG/ML
0.2 INJECTION, SOLUTION INTRAMUSCULAR; INTRAVENOUS; SUBCUTANEOUS EVERY 5 MIN PRN
Status: CANCELLED | OUTPATIENT
Start: 2022-04-25

## 2022-04-25 RX ORDER — PROPOFOL 10 MG/ML
VIAL (ML) INTRAVENOUS CONTINUOUS PRN
Status: DISCONTINUED | OUTPATIENT
Start: 2022-04-25 | End: 2022-04-25

## 2022-04-25 RX ORDER — SODIUM CHLORIDE 9 MG/ML
INJECTION, SOLUTION INTRAVENOUS CONTINUOUS
Status: DISCONTINUED | OUTPATIENT
Start: 2022-04-25 | End: 2022-04-25 | Stop reason: HOSPADM

## 2022-04-25 RX ADMIN — SODIUM CHLORIDE: 0.9 INJECTION, SOLUTION INTRAVENOUS at 01:04

## 2022-04-25 RX ADMIN — PROPOFOL 50 MG: 10 INJECTION, EMULSION INTRAVENOUS at 01:04

## 2022-04-25 RX ADMIN — LIDOCAINE HYDROCHLORIDE 100 MG: 20 INJECTION, SOLUTION INTRAVENOUS at 01:04

## 2022-04-25 RX ADMIN — GLYCOPYRROLATE 0.2 MG: 0.2 INJECTION, SOLUTION INTRAMUSCULAR; INTRAVITREAL at 01:04

## 2022-04-25 RX ADMIN — Medication 150 MCG/KG/MIN: at 01:04

## 2022-04-25 NOTE — H&P
History & Physical - Short Stay  Gastroenterology      SUBJECTIVE:     Procedure: EUS    Chief Complaint/Indication for Procedure: Pancreas cyst    History of Present Illness:  Patient is a 71 y.o. female presents with pancreas cyst here for surveillance.     PTA Medications   Medication Sig    atorvastatin (LIPITOR) 40 MG tablet TAKE 1 TABLET BY MOUTH EVERY DAILY    b complex vitamins capsule Take 1 capsule by mouth once daily.    calcium carbonate (OS-SANDRINE) 500 mg calcium (1,250 mg) tablet Take 2 tablets (1,000 mg total) by mouth 2 (two) times daily.    cyanocobalamin, vitamin B-12, 1,000 mcg TbSR Take 1,000 mcg by mouth once daily.    denosumab (PROLIA) 60 mg/mL Syrg Inject 1 mL (60 mg total) into the skin every 6 (six) months.    fluticasone propion-salmeterol 115-21 mcg/dose (ADVAIR HFA) 115-21 mcg/actuation HFAA inhaler Inhale 2 puffs into the lungs every 12 (twelve) hours.    gabapentin (NEURONTIN) 300 MG capsule Take 1 capsule (300 mg total) by mouth 3 (three) times daily.    guaiFENesin (MUCINEX) 600 mg 12 hr tablet Take 1,200 mg by mouth 2 (two) times daily as needed for Congestion.    HYDROcodone-acetaminophen (NORCO)  mg per tablet Take 1 tablet by mouth every 12 (twelve) hours as needed for Pain.    losartan (COZAAR) 25 MG tablet Take 1 tablet (25 mg total) by mouth once daily.    magnesium oxide (MAG-OX) 400 mg (241.3 mg magnesium) tablet Take 1 tablet by mouth every 12 (twelve) hours.    metoprolol succinate (TOPROL-XL) 25 MG 24 hr tablet Take 2 tablets (50 mg total) by mouth once daily.    multivit-min/iron/folic/lutein (CENTRUM SILVER WOMEN ORAL) Take 1 tablet by mouth once daily.    pantoprazole (PROTONIX) 40 MG tablet Take 1 tablet (40 mg total) by mouth once daily.    torsemide (DEMADEX) 20 MG Tab Take 1 tablet (20 mg total) by mouth once daily. (Patient taking differently: Take 20 mg by mouth once daily. Pt has been taking 2 BID daily for last few days)    umeclidinium  (INCRUSE ELLIPTA) 62.5 mcg/actuation inhalation capsule Inhale 1 puff into the lungs once daily. Controller    vitamin D (VITAMIN D3) 1000 units Tab Take 1 tablet (1,000 Units total) by mouth once daily.    ZINC ORAL Take by mouth.    acetaminophen (TYLENOL) 500 MG tablet Take 2 tablets (1,000 mg total) by mouth every 8 (eight) hours as needed.    albuterol (VENTOLIN HFA) 90 mcg/actuation inhaler inhale 1-2 puffs as needed every 6 hours for wheezing and shortness of breath (Patient taking differently: inhale 1-2 puffs as needed every 6 hours for wheezing and shortness of breath)    fluticasone (FLONASE) 50 mcg/actuation nasal spray 1 spray by Each Nare route 2 (two) times daily as needed for Rhinitis.       Review of patient's allergies indicates:   Allergen Reactions    Wellbutrin [bupropion hcl] Other (See Comments)     Hyponatremia and hypomagnesia     Zoloft [sertraline] Other (See Comments)     Hyponatremia and hypomagnesia     Bananas [banana]      Emesis, and stomach cramps        Past Medical History:   Diagnosis Date    Anxiety     Cervical spinal stenosis     Choledocholithiasis     Chronic obstructive pulmonary disease 03/16/2016    Degenerative disc disease of cervical spine     Diverticulosis 04/24/2018    History of alcohol abuse 03/02/2021    History of gastric ulcer     History of L3 compression fracture 12/19/2018    History of lung cancer     s/p completion of XRT in 10/2020    Hyperlipidemia     Iron deficiency anemia     Osteoarthritis     Stage III CKD 2017     Past Surgical History:   Procedure Laterality Date    BREAST CYST EXCISION Right     1967    CATARACT EXTRACTION      COLONOSCOPY  2015    CORONARY ANGIOGRAPHY N/A 7/20/2018    Procedure: ANGIOGRAM, CORONARY ARTERY;  Surgeon: Willard Roberts MD;  Location: [facility] CATH LAB;  Service: Cardiovascular;  Laterality: N/A;    ENDOSCOPIC ULTRASOUND OF UPPER GASTROINTESTINAL TRACT N/A 3/24/2021    Procedure:  ULTRASOUND, UPPER GI TRACT, ENDOSCOPIC;  Surgeon: Helio Cheema MD;  Location: [facility] ENDO (2ND FLR);  Service: Endoscopy;  Laterality: N/A;    ERCP N/A 3/24/2021    Procedure: ERCP (ENDOSCOPIC RETROGRADE CHOLANGIOPANCREATOGRAPHY);  Surgeon: Helio Cheema MD;  Location: [facility] ENDO (2ND FLR);  Service: Endoscopy;  Laterality: N/A;    ERCP N/A 4/30/2021    Procedure: ERCP (ENDOSCOPIC RETROGRADE CHOLANGIOPANCREATOGRAPHY);  Surgeon: Boo Gray MD;  Location: [facility] ENDO (2ND FLR);  Service: Endoscopy;  Laterality: N/A;    ERCP N/A 7/1/2021    Procedure: ERCP (ENDOSCOPIC RETROGRADE CHOLANGIOPANCREATOGRAPHY);  Surgeon: Helio Cheema MD;  Location: [facility] ENDO (2ND FLR);  Service: Endoscopy;  Laterality: N/A;  Ok for Taxi. Dr Cheema  rapid covid 1130am- tb inst email    ESOPHAGOGASTRODUODENOSCOPY  2015    ESOPHAGOGASTRODUODENOSCOPY N/A 3/4/2021    Procedure: EGD (ESOPHAGOGASTRODUODENOSCOPY);  Surgeon: Yenifer Ramirez MD;  Location: [facility];  Service: Endoscopy;  Laterality: N/A;    ESOPHAGOGASTRODUODENOSCOPY N/A 3/24/2021    Procedure: EGD (ESOPHAGOGASTRODUODENOSCOPY);  Surgeon: Helio Cheema MD;  Location: [facility] (2ND FLR);  Service: Endoscopy;  Laterality: N/A;    EYE SURGERY  2016    Cataracts    FRACTURE SURGERY  2014    JOINT REPLACEMENT  2007    ORIF FEMUR FRACTURE Left     TOTAL KNEE ARTHROPLASTY Left 2007    TUBAL LIGATION       Family History   Problem Relation Age of Onset    Hypertension Mother     Alzheimer's disease Mother     Dementia Mother     Depression Mother         Alzheimers    Myasthenia gravis Father     Arthritis Father         Myasthenia Gravis    Rectal cancer Father     Hypertension Brother     Arthritis Brother         Colon cancer, obese, high blood pressure    Colon cancer Brother     No Known Problems Son     Cancer Sister         Brain cancer    Miscarriages / Stillbirths Sister     Melanoma Neg Hx     Breast cancer Neg Hx      Social  History     Tobacco Use    Smoking status: Former Smoker     Packs/day: 1.50     Years: 50.00     Pack years: 75.00     Types: Cigarettes    Smokeless tobacco: Never Used   Substance Use Topics    Alcohol use: Yes     Alcohol/week: 7.0 standard drinks     Types: 7 Shots of liquor per week     Comment: at least 1 cocktail a day    Drug use: No       Review of Systems:  Constitutional: no fever or chills  Respiratory: no cough or shortness of breath  Cardiovascular: no chest pain or palpitations    OBJECTIVE:     Vital Signs (Most Recent)       Physical Exam:  General: well developed, well nourished  Lungs:  normal respiratory effort  Heart: regular rate, S1, S2 normal    Laboratory  CBC: No results for input(s): WBC, RBC, HGB, HCT, PLT, MCV, MCH, MCHC in the last 168 hours.  CMP: No results for input(s): GLU, CALCIUM, ALBUMIN, PROT, NA, K, CO2, CL, BUN, CREATININE, ALKPHOS, ALT, AST, BILITOT in the last 168 hours.  Coagulation: No results for input(s): LABPROT, INR, APTT in the last 168 hours.      Diagnostic Results:      ASSESSMENT/PLAN:     Pancreas cyst    Plan: EUS    Anesthesia Plan: MAC    ASA Grade: ASA 3 - Patient with moderate systemic disease with functional limitations     The impression and plan was discussed in detail with the patient. All questions have been answered and the patient voices understanding of our plan at this point. The risk of the procedure was discussed in detail which includes but not limited to bleeding, infection, perforation in some cases requiring surgery with its spectrum of complications.

## 2022-04-25 NOTE — [employer] PATIENT INSTRUCTIONS
Discharge Summary/Instructions after an Endoscopic Procedure  Patient Name: Nalini Varma  Patient MRN: [id number]  Patient YOB: 1950  Monday, April 25, 2022  Helio Cheema MD  Dear patient,  As a result of recent federal legislation (The Federal Cures Act), you may   receive lab or pathology results from your procedure in your [facility]   account before your physician is able to contact you. Your physician or   their representative will relay the results to you with their   recommendations at their soonest availability.  Thank you,  RESTRICTIONS:  During your procedure today, you received medications for sedation.  These   medications may affect your judgment, balance and coordination.  Therefore,   for 24 hours, you have the following restrictions:   - DO NOT drive a car, operate machinery, make legal/financial decisions,   sign important papers or drink alcohol.    ACTIVITY:  Today: no heavy lifting, straining or running due to procedural   sedation/anesthesia.  The following day: return to full activity including work.  DIET:  Eat and drink normally unless instructed otherwise.     TREATMENT FOR COMMON SIDE EFFECTS:  - Mild abdominal pain, nausea, belching, bloating or excessive gas:  rest,   eat lightly and use a heating pad.  - Sore Throat: treat with throat lozenges and/or gargle with warm salt   water.  - Because air was used during the procedure, expelling large amounts of air   from your rectum or belching is normal.  - If a bowel prep was taken, you may not have a bowel movement for 1-3 days.    This is normal.  SYMPTOMS TO WATCH FOR AND REPORT TO YOUR PHYSICIAN:  1. Abdominal pain or bloating, other than gas cramps.  2. Chest pain.  3. Back pain.  4. Signs of infection such as: chills or fever occurring within 24 hours   after the procedure.  5. Rectal bleeding, which would show as bright red, maroon, or black stools.   (A tablespoon of blood from the rectum is not serious, especially if    hemorrhoids are present.)  6. Vomiting.  7. Weakness or dizziness.  GO DIRECTLY TO THE NEAREST EMERGENCY ROOM IF YOU HAVE ANY OF THE FOLLOWING:      Difficulty breathing              Chills and/or fever over 101 F   Persistent vomiting and/or vomiting blood   Severe abdominal pain   Severe chest pain   Black, tarry stools   Bleeding- more than one tablespoon   Any other symptom or condition that you feel may need urgent attention  Your doctor recommends these additional instructions:  If any biopsies were taken, your doctors clinic will contact you in 1 to 2   weeks with any results.  - Discharge patient to home (ambulatory).   - Perform magnetic resonance imaging (MRI) with gadolinium in 1 year.  For questions, problems or results please call your physician - Helio Cheema MD at Work:  [phone].  [facility], EMERGENCY ROOM PHONE NUMBER: [phone]  IF A COMPLICATION OR EMERGENCY SITUATION ARISES AND YOU ARE UNABLE TO REACH   YOUR PHYSICIAN - GO DIRECTLY TO THE EMERGENCY ROOM.  Helio Cheema MD  4/25/2022 1:55:10 PM  This report has been verified and signed electronically.  Dear patient,  As a result of recent federal legislation (The Federal Cures Act), you may   receive lab or pathology results from your procedure in your [facility]   account before your physician is able to contact you. Your physician or   their representative will relay the results to you with their   recommendations at their soonest availability.  Thank you,  [employer]ATION

## 2022-04-25 NOTE — TRANSFER OF CARE
"Anesthesia Transfer of Care Note    Patient: Nalini Varma    Procedure(s) Performed: Procedure(s) (LRB):  ULTRASOUND, UPPER GI TRACT, ENDOSCOPIC (N/A)    Patient location: [facility]    Anesthesia Type: general    Transport from OR: Transported from OR on 2-3 L/min O2 by NC with adequate spontaneous ventilation    Post pain: adequate analgesia    Post assessment: no apparent anesthetic complications and tolerated procedure well    Post vital signs: stable    Level of consciousness: awake, alert and oriented    Nausea/Vomiting: no nausea/vomiting    Complications: none    Transfer of care protocol was followed      Last vitals:   Visit Vitals  BP (!) 199/93 (BP Location: Left arm, Patient Position: Lying)   Pulse 72   Temp 36.3 °C (97.3 °F) (Temporal)   Resp 18   Ht 5' 6" (1.676 m)   Wt 95.3 kg (210 lb)   SpO2 100%   Breastfeeding No   BMI 33.89 kg/m²     "

## 2022-04-25 NOTE — ANESTHESIA POSTPROCEDURE EVALUATION
Anesthesia Post Evaluation    Patient: Nalini Varma    Procedure(s) Performed: Procedure(s) (LRB):  ULTRASOUND, UPPER GI TRACT, ENDOSCOPIC (N/A)    Final Anesthesia Type: general      Patient location during evaluation: [facility]  Patient participation: Yes- Able to Participate  Level of consciousness: awake and alert and oriented  Post-procedure vital signs: reviewed and stable  Pain management: adequate  Airway patency: patent    PONV status at discharge: No PONV  Anesthetic complications: no      Cardiovascular status: hemodynamically stable  Respiratory status: unassisted, spontaneous ventilation and room air  Hydration status: euvolemic  Follow-up not needed.          Vitals Value Taken Time   /76 04/25/22 1436   Temp 36.6 °C (97.9 °F) 04/25/22 1435   Pulse 73 04/25/22 1436   Resp 16 04/25/22 1435   SpO2 100 % 04/25/22 1436   Vitals shown include unvalidated device data.      No case tracking events are documented in the log.      Pain/Muriel Score: Muriel Score: 9 (4/25/2022  2:25 PM)

## 2022-04-25 NOTE — PLAN OF CARE
[employer] called and will be 20 minutes. One stop ahead of patient transport. Wheelchair provided and will escort pt to front of hospital entrance.

## 2022-04-25 NOTE — ANESTHESIA PREPROCEDURE EVALUATION
04/25/2022  Nalini Varma is a 71 y.o., female.      Pre-op Assessment    I have reviewed the Patient Summary Reports.     I have reviewed the Nursing Notes. I have reviewed the NPO Status.   I have reviewed the Medications.     Review of Systems  Anesthesia Hx:  No problems with previous Anesthesia  History of prior surgery of interest to airway management or planning: Previous anesthesia: General  Denies Personal Hx of Anesthesia complications.   Cardiovascular:   Hypertension CHF ECG has been reviewed.    Pulmonary:   COPD    Renal/:   Chronic Renal Disease, CRI    Hepatic/GI:  Hepatic/GI Normal    Musculoskeletal:   Arthritis     Neurological:  Neurology Normal    Endocrine:  Endocrine Normal    Psych:   Psychiatric History        Patient Active Problem List   Diagnosis    Chronic obstructive pulmonary disease    Opioid dependence    Essential hypertension    Iron deficiency anemia    Tobacco dependence    Hyperlipidemia    Osteoporosis    Generalized anxiety disorder    Hypomagnesemia    Pulmonary nodule    Diverticulosis    Chronic combined systolic and diastolic CHF (congestive heart failure)    History of L3 compression fracture    Vitamin D deficiency    Vitamin B12 deficiency    Chronic pain syndrome    History of lung cancer    Chronic GI bleeding    History of alcohol abuse    Choledocholithiasis    Elevated LFTs    Urinary retention    Cervical spinal stenosis    Debility    Chronic hypoxemic respiratory failure    Hyponatremia    Hypocalcemia    Spinal stenosis     Past Medical History:   Diagnosis Date    Anxiety     Cervical spinal stenosis     Choledocholithiasis     Chronic obstructive pulmonary disease 03/16/2016    Degenerative disc disease of cervical spine     Diverticulosis 04/24/2018    History of alcohol abuse 03/02/2021    History of gastric  ulcer     History of L3 compression fracture 12/19/2018    History of lung cancer     s/p completion of XRT in 10/2020    Hyperlipidemia     Iron deficiency anemia     Osteoarthritis     Stage III CKD 2017     Past Surgical History:   Procedure Laterality Date    BREAST CYST EXCISION Right     1967    CATARACT EXTRACTION      COLONOSCOPY  2015    CORONARY ANGIOGRAPHY N/A 7/20/2018    Procedure: ANGIOGRAM, CORONARY ARTERY;  Surgeon: Willard Roberts MD;  Location: [facility] CATH LAB;  Service: Cardiovascular;  Laterality: N/A;    ENDOSCOPIC ULTRASOUND OF UPPER GASTROINTESTINAL TRACT N/A 3/24/2021    Procedure: ULTRASOUND, UPPER GI TRACT, ENDOSCOPIC;  Surgeon: Helio Cheema MD;  Location: [facility] (2ND FLR);  Service: Endoscopy;  Laterality: N/A;    ERCP N/A 3/24/2021    Procedure: ERCP (ENDOSCOPIC RETROGRADE CHOLANGIOPANCREATOGRAPHY);  Surgeon: Helio Cheema MD;  Location: [facility] (2ND FLR);  Service: Endoscopy;  Laterality: N/A;    ERCP N/A 4/30/2021    Procedure: ERCP (ENDOSCOPIC RETROGRADE CHOLANGIOPANCREATOGRAPHY);  Surgeon: Boo Gray MD;  Location: [facility] (2ND FLR);  Service: Endoscopy;  Laterality: N/A;    ERCP N/A 7/1/2021    Procedure: ERCP (ENDOSCOPIC RETROGRADE CHOLANGIOPANCREATOGRAPHY);  Surgeon: Helio Cheema MD;  Location: [facility] (2ND FLR);  Service: Endoscopy;  Laterality: N/A;  Ok for Taxi. Dr Cheema  rapid covid 1130am- tb inst email    ESOPHAGOGASTRODUODENOSCOPY  2015    ESOPHAGOGASTRODUODENOSCOPY N/A 3/4/2021    Procedure: EGD (ESOPHAGOGASTRODUODENOSCOPY);  Surgeon: Yenifer Ramirez MD;  Location: [facility];  Service: Endoscopy;  Laterality: N/A;    ESOPHAGOGASTRODUODENOSCOPY N/A 3/24/2021    Procedure: EGD (ESOPHAGOGASTRODUODENOSCOPY);  Surgeon: Helio Cheema MD;  [facility]  Service: Endoscopy;  Laterality: N/A;    EYE SURGERY  2016    Cataracts    FRACTURE SURGERY  2014    JOINT REPLACEMENT  2007    ORIF FEMUR FRACTURE  Left     TOTAL KNEE ARTHROPLASTY Left 2007    TUBAL LIGATION              Physical Exam  General: Well nourished, Cooperative and Alert    Airway:  Mallampati: II   Mouth Opening: Normal  TM Distance: Normal  Tongue: Normal  Neck ROM: Normal ROM    Dental:  Intact    Chest/Lungs:  Clear to auscultation, Normal Respiratory Rate    Heart:  Rate: Normal  Rhythm: Regular Rhythm      Lab Results   Component Value Date    WBC 9.35 03/18/2022    HGB 9.6 (L) 03/18/2022    HCT 29.8 (L) 03/18/2022    MCV 94 03/18/2022     03/18/2022     BMP  Lab Results   Component Value Date     04/14/2022    K 4.7 04/14/2022    CL 97 04/14/2022    CO2 29 04/14/2022    BUN 33 (H) 04/14/2022    CREATININE 1.3 04/14/2022    CALCIUM 9.9 04/14/2022    ANIONGAP 12 04/14/2022    ESTGFRAFRICA 47 (A) 04/14/2022    EGFRNONAA 41 (A) 04/14/2022     TTE (5/21):  · The left ventricle is normal in size with moderately decreased systolic function.  · The estimated ejection fraction is 38%.  · There are segmental left ventricular wall motion abnormalities.  · Left ventricular diastolic dysfunction.  · Normal right ventricular size with low normal right ventricular systolic function.  · Mild right atrial enlargement.  · There is mild aortic valve stenosis.  · Aortic valve area is 2.00 cm2; peak velocity is 1.91 m/s; mean gradient is 10 mmHg.  · Normal central venous pressure (3 mmHg).        Anesthesia Plan  Type of Anesthesia, risks & benefits discussed:    Anesthesia Type: Gen Natural Airway, Gen ETT, MAC  Intra-op Monitoring Plan: Standard ASA Monitors  Post Op Pain Control Plan: multimodal analgesia  Induction:  IV  Airway Plan: Direct, Post-Induction  Informed Consent: Informed consent signed with the Patient and all parties understand the risks and agree with anesthesia plan.  All questions answered.   ASA Score: 3  Day of Surgery Review of History & Physical: H&P Update referred to the surgeon/provider.    Ready For Surgery From  Anesthesia Perspective.     .

## 2022-04-25 NOTE — PLAN OF CARE
Pt given discharge instructions. Pt verbalizes understanding. Pt questions all answered. [employer] called; per dispatch will take 15 minutes- 2 hours. Will call nursing staf upon arrival. Pt IV access removed; bleeding controlled. Pt attached to personal oxygen concentrator. Pt tolerating well. Pt VSS. Will contonue to monitor.

## 2022-04-29 ENCOUNTER — PATIENT MESSAGE (OUTPATIENT)
Dept: ADMINISTRATIVE | Facility: OTHER | Age: 72
End: 2022-04-29
Payer: MEDICARE

## 2022-04-29 ENCOUNTER — OFFICE VISIT (OUTPATIENT)
Dept: INTERNAL MEDICINE | Facility: CLINIC | Age: 72
End: 2022-04-29
Attending: INTERNAL MEDICINE
Payer: MEDICARE

## 2022-04-29 ENCOUNTER — OFFICE VISIT (OUTPATIENT)
Dept: ENDOCRINOLOGY | Facility: CLINIC | Age: 72
End: 2022-04-29
Attending: INTERNAL MEDICINE
Payer: MEDICARE

## 2022-04-29 ENCOUNTER — OFFICE VISIT (OUTPATIENT)
Dept: HEMATOLOGY/ONCOLOGY | Facility: CLINIC | Age: 72
End: 2022-04-29
Attending: INTERNAL MEDICINE
Payer: MEDICARE

## 2022-04-29 VITALS
SYSTOLIC BLOOD PRESSURE: 153 MMHG | BODY MASS INDEX: 34.37 KG/M2 | DIASTOLIC BLOOD PRESSURE: 71 MMHG | HEART RATE: 78 BPM | WEIGHT: 213.88 LBS | HEIGHT: 66 IN

## 2022-04-29 VITALS
SYSTOLIC BLOOD PRESSURE: 142 MMHG | DIASTOLIC BLOOD PRESSURE: 80 MMHG | HEIGHT: 66 IN | HEART RATE: 81 BPM | OXYGEN SATURATION: 100 % | BODY MASS INDEX: 33.89 KG/M2

## 2022-04-29 DIAGNOSIS — Z85.118 HISTORY OF LUNG CANCER: ICD-10-CM

## 2022-04-29 DIAGNOSIS — K86.9 PANCREATIC LESION: Primary | ICD-10-CM

## 2022-04-29 DIAGNOSIS — F41.1 GENERALIZED ANXIETY DISORDER: ICD-10-CM

## 2022-04-29 DIAGNOSIS — J96.11 CHRONIC HYPOXEMIC RESPIRATORY FAILURE: ICD-10-CM

## 2022-04-29 DIAGNOSIS — E83.42 HYPOMAGNESEMIA: ICD-10-CM

## 2022-04-29 DIAGNOSIS — Z12.11 SCREENING FOR COLON CANCER: ICD-10-CM

## 2022-04-29 DIAGNOSIS — E83.51 HYPOCALCEMIA: ICD-10-CM

## 2022-04-29 DIAGNOSIS — I10 HYPERTENSION, BENIGN: Primary | ICD-10-CM

## 2022-04-29 DIAGNOSIS — E66.9 CLASS 1 OBESITY WITH SERIOUS COMORBIDITY AND BODY MASS INDEX (BMI) OF 34.0 TO 34.9 IN ADULT, UNSPECIFIED OBESITY TYPE: ICD-10-CM

## 2022-04-29 DIAGNOSIS — J44.9 CHRONIC OBSTRUCTIVE PULMONARY DISEASE, UNSPECIFIED COPD TYPE: ICD-10-CM

## 2022-04-29 DIAGNOSIS — E78.5 HYPERLIPIDEMIA, UNSPECIFIED HYPERLIPIDEMIA TYPE: Chronic | ICD-10-CM

## 2022-04-29 DIAGNOSIS — E04.2 MULTIPLE THYROID NODULES: ICD-10-CM

## 2022-04-29 DIAGNOSIS — D50.0 IRON DEFICIENCY ANEMIA DUE TO CHRONIC BLOOD LOSS: ICD-10-CM

## 2022-04-29 DIAGNOSIS — I50.42 CHRONIC COMBINED SYSTOLIC AND DIASTOLIC CHF (CONGESTIVE HEART FAILURE): Chronic | ICD-10-CM

## 2022-04-29 DIAGNOSIS — I10 ESSENTIAL HYPERTENSION: Chronic | ICD-10-CM

## 2022-04-29 DIAGNOSIS — J30.1 SEASONAL ALLERGIC RHINITIS DUE TO POLLEN: ICD-10-CM

## 2022-04-29 DIAGNOSIS — M81.8 OTHER OSTEOPOROSIS, UNSPECIFIED PATHOLOGICAL FRACTURE PRESENCE: Primary | ICD-10-CM

## 2022-04-29 DIAGNOSIS — F17.200 TOBACCO DEPENDENCE: ICD-10-CM

## 2022-04-29 DIAGNOSIS — N18.30 STAGE 3 CHRONIC KIDNEY DISEASE, UNSPECIFIED WHETHER STAGE 3A OR 3B CKD: ICD-10-CM

## 2022-04-29 PROCEDURE — 99214 PR OFFICE/OUTPT VISIT, EST, LEVL IV, 30-39 MIN: ICD-10-PCS | Mod: 95,,, | Performed by: STUDENT IN AN ORGANIZED HEALTH CARE EDUCATION/TRAINING PROGRAM

## 2022-04-29 PROCEDURE — 3077F PR MOST RECENT SYSTOLIC BLOOD PRESSURE >= 140 MM HG: ICD-10-PCS | Mod: CPTII,S$GLB,, | Performed by: INTERNAL MEDICINE

## 2022-04-29 PROCEDURE — 3078F DIAST BP <80 MM HG: CPT | Mod: CPTII,S$GLB,, | Performed by: INTERNAL MEDICINE

## 2022-04-29 PROCEDURE — 99214 PR OFFICE/OUTPT VISIT, EST, LEVL IV, 30-39 MIN: ICD-10-PCS | Mod: S$GLB,,, | Performed by: INTERNAL MEDICINE

## 2022-04-29 PROCEDURE — 99499 RISK ADDL DX/OHS AUDIT: ICD-10-PCS | Mod: 95,,, | Performed by: STUDENT IN AN ORGANIZED HEALTH CARE EDUCATION/TRAINING PROGRAM

## 2022-04-29 PROCEDURE — 3288F PR FALLS RISK ASSESSMENT DOCUMENTED: ICD-10-PCS | Mod: CPTII,S$GLB,, | Performed by: INTERNAL MEDICINE

## 2022-04-29 PROCEDURE — 1160F RVW MEDS BY RX/DR IN RCRD: CPT | Mod: CPTII,S$GLB,, | Performed by: INTERNAL MEDICINE

## 2022-04-29 PROCEDURE — 3008F PR BODY MASS INDEX (BMI) DOCUMENTED: ICD-10-PCS | Mod: CPTII,S$GLB,, | Performed by: INTERNAL MEDICINE

## 2022-04-29 PROCEDURE — 1160F RVW MEDS BY RX/DR IN RCRD: CPT | Mod: CPTII,95,, | Performed by: STUDENT IN AN ORGANIZED HEALTH CARE EDUCATION/TRAINING PROGRAM

## 2022-04-29 PROCEDURE — 4010F ACE/ARB THERAPY RXD/TAKEN: CPT | Mod: CPTII,S$GLB,, | Performed by: INTERNAL MEDICINE

## 2022-04-29 PROCEDURE — 3044F HG A1C LEVEL LT 7.0%: CPT | Mod: CPTII,S$GLB,, | Performed by: INTERNAL MEDICINE

## 2022-04-29 PROCEDURE — 99999 PR PBB SHADOW E&M-EST. PATIENT-LVL IV: CPT | Mod: PBBFAC,,, | Performed by: INTERNAL MEDICINE

## 2022-04-29 PROCEDURE — 1160F PR REVIEW ALL MEDS BY PRESCRIBER/CLIN PHARMACIST DOCUMENTED: ICD-10-PCS | Mod: CPTII,S$GLB,, | Performed by: INTERNAL MEDICINE

## 2022-04-29 PROCEDURE — 1159F PR MEDICATION LIST DOCUMENTED IN MEDICAL RECORD: ICD-10-PCS | Mod: CPTII,S$GLB,, | Performed by: INTERNAL MEDICINE

## 2022-04-29 PROCEDURE — 1101F PR PT FALLS ASSESS DOC 0-1 FALLS W/OUT INJ PAST YR: ICD-10-PCS | Mod: CPTII,S$GLB,, | Performed by: INTERNAL MEDICINE

## 2022-04-29 PROCEDURE — 1159F MED LIST DOCD IN RCRD: CPT | Mod: CPTII,S$GLB,, | Performed by: INTERNAL MEDICINE

## 2022-04-29 PROCEDURE — 4010F ACE/ARB THERAPY RXD/TAKEN: CPT | Mod: CPTII,95,, | Performed by: STUDENT IN AN ORGANIZED HEALTH CARE EDUCATION/TRAINING PROGRAM

## 2022-04-29 PROCEDURE — 3288F FALL RISK ASSESSMENT DOCD: CPT | Mod: CPTII,S$GLB,, | Performed by: INTERNAL MEDICINE

## 2022-04-29 PROCEDURE — 3044F PR MOST RECENT HEMOGLOBIN A1C LEVEL <7.0%: ICD-10-PCS | Mod: CPTII,95,, | Performed by: STUDENT IN AN ORGANIZED HEALTH CARE EDUCATION/TRAINING PROGRAM

## 2022-04-29 PROCEDURE — 1101F PT FALLS ASSESS-DOCD LE1/YR: CPT | Mod: CPTII,S$GLB,, | Performed by: INTERNAL MEDICINE

## 2022-04-29 PROCEDURE — 3078F PR MOST RECENT DIASTOLIC BLOOD PRESSURE < 80 MM HG: ICD-10-PCS | Mod: CPTII,S$GLB,, | Performed by: INTERNAL MEDICINE

## 2022-04-29 PROCEDURE — 3079F DIAST BP 80-89 MM HG: CPT | Mod: CPTII,S$GLB,, | Performed by: INTERNAL MEDICINE

## 2022-04-29 PROCEDURE — 99214 OFFICE O/P EST MOD 30 MIN: CPT | Mod: 95,,, | Performed by: STUDENT IN AN ORGANIZED HEALTH CARE EDUCATION/TRAINING PROGRAM

## 2022-04-29 PROCEDURE — 1160F PR REVIEW ALL MEDS BY PRESCRIBER/CLIN PHARMACIST DOCUMENTED: ICD-10-PCS | Mod: CPTII,95,, | Performed by: STUDENT IN AN ORGANIZED HEALTH CARE EDUCATION/TRAINING PROGRAM

## 2022-04-29 PROCEDURE — 4010F PR ACE/ARB THEARPY RXD/TAKEN: ICD-10-PCS | Mod: CPTII,S$GLB,, | Performed by: INTERNAL MEDICINE

## 2022-04-29 PROCEDURE — 3044F PR MOST RECENT HEMOGLOBIN A1C LEVEL <7.0%: ICD-10-PCS | Mod: CPTII,S$GLB,, | Performed by: INTERNAL MEDICINE

## 2022-04-29 PROCEDURE — 1159F MED LIST DOCD IN RCRD: CPT | Mod: CPTII,95,, | Performed by: STUDENT IN AN ORGANIZED HEALTH CARE EDUCATION/TRAINING PROGRAM

## 2022-04-29 PROCEDURE — 99499 UNLISTED E&M SERVICE: CPT | Mod: 95,,, | Performed by: STUDENT IN AN ORGANIZED HEALTH CARE EDUCATION/TRAINING PROGRAM

## 2022-04-29 PROCEDURE — 3077F SYST BP >= 140 MM HG: CPT | Mod: CPTII,S$GLB,, | Performed by: INTERNAL MEDICINE

## 2022-04-29 PROCEDURE — 1159F PR MEDICATION LIST DOCUMENTED IN MEDICAL RECORD: ICD-10-PCS | Mod: CPTII,95,, | Performed by: STUDENT IN AN ORGANIZED HEALTH CARE EDUCATION/TRAINING PROGRAM

## 2022-04-29 PROCEDURE — 99499 RISK ADDL DX/OHS AUDIT: ICD-10-PCS | Mod: S$GLB,,, | Performed by: INTERNAL MEDICINE

## 2022-04-29 PROCEDURE — 99214 OFFICE O/P EST MOD 30 MIN: CPT | Mod: S$GLB,,, | Performed by: INTERNAL MEDICINE

## 2022-04-29 PROCEDURE — 3079F PR MOST RECENT DIASTOLIC BLOOD PRESSURE 80-89 MM HG: ICD-10-PCS | Mod: CPTII,S$GLB,, | Performed by: INTERNAL MEDICINE

## 2022-04-29 PROCEDURE — 3044F HG A1C LEVEL LT 7.0%: CPT | Mod: CPTII,95,, | Performed by: STUDENT IN AN ORGANIZED HEALTH CARE EDUCATION/TRAINING PROGRAM

## 2022-04-29 PROCEDURE — 3008F BODY MASS INDEX DOCD: CPT | Mod: CPTII,S$GLB,, | Performed by: INTERNAL MEDICINE

## 2022-04-29 PROCEDURE — 1125F AMNT PAIN NOTED PAIN PRSNT: CPT | Mod: CPTII,S$GLB,, | Performed by: INTERNAL MEDICINE

## 2022-04-29 PROCEDURE — 99999 PR PBB SHADOW E&M-EST. PATIENT-LVL IV: ICD-10-PCS | Mod: PBBFAC,,, | Performed by: INTERNAL MEDICINE

## 2022-04-29 PROCEDURE — 99499 UNLISTED E&M SERVICE: CPT | Mod: S$GLB,,, | Performed by: INTERNAL MEDICINE

## 2022-04-29 PROCEDURE — 1125F PR PAIN SEVERITY QUANTIFIED, PAIN PRESENT: ICD-10-PCS | Mod: CPTII,S$GLB,, | Performed by: INTERNAL MEDICINE

## 2022-04-29 PROCEDURE — 4010F PR ACE/ARB THEARPY RXD/TAKEN: ICD-10-PCS | Mod: CPTII,95,, | Performed by: STUDENT IN AN ORGANIZED HEALTH CARE EDUCATION/TRAINING PROGRAM

## 2022-04-29 RX ORDER — LOSARTAN POTASSIUM 50 MG/1
50 TABLET ORAL DAILY
Qty: 90 TABLET | Refills: 3 | Status: SHIPPED | OUTPATIENT
Start: 2022-04-29 | End: 2022-07-15

## 2022-04-29 RX ORDER — AZELASTINE 1 MG/ML
1 SPRAY, METERED NASAL 2 TIMES DAILY
Qty: 30 ML | Refills: 3 | Status: SHIPPED | OUTPATIENT
Start: 2022-04-29 | End: 2023-02-28 | Stop reason: SDUPTHER

## 2022-04-29 NOTE — PROGRESS NOTES
"rugSubjective:   Patient ID: Nalini Varma is a 71 y.o. 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    - on chart review, "was previously seen in May of 2021 and evaluated by neurosurgery and found to have some cervical stenosis, a chronic L3 compression fracture with stenosis, and dynamic instability on her cervical flexion extension films.   - She would benefit from a posterior cervical decompression/fusion but is a poor surgical candidate; she was discharged with recommendations for a cervical collar     Today:   Had endoscopy on Monday - reviewed report with pt today   - Taking mag, b12, calcium and vit d - now taking oral iron qod and tolerating  Swelling markedly improved with elevating feet - was able to receive furniture since lcv and air mattress now with real mattress     Had VV with h/o today  - Anemia - to rtc in 6 months   - reports not taking reglan at this time     EUS 4/25/2022 with Romera in GI  - Four cystic lesions were seen in the pancreatic                          body and pancreatic tail. Tissue has not been                          obtained. However, the endosonographic appearance                          is highly suspicious for a branched intraductal                          papillary mucinous neoplasm.   - to repeat MRI 1 year    Osteoporosis:   - had dexa and prolia injection 4/14/2022   - skipped doses last year due to displacement and delayed f/u with endo for hypocalcemia that resolved " "on last labs   - has appt with endo after this appt today     Upcoming appt with cards and endo today   Labs pendign today     JONATHAN resolved with resuming prior dose of diuretic     HTN:  taking losartan, metoprolol 25, torsemide  Followed by dig htn prog     SHF:   Seen by cards 9/10/2021 - appt scheduled   - swelling improved as above     Lung cancer, AoCD, LESLY 2/2 to ABL (melena in March): Seen by h/o today  - had 1 unit prbc during last hospitalization   - no gross bleeding    Hypomag:   Taking mag daily - level improved    Tobacco abuse:   Previously quit Feb 2021 - has been smoking intermittently 1-2 cigs when anxious    On 2.5L oxygen     Chronic pain syndrome, OA of knees and hips, DJD of cervical spine:   Now on norco 10mg/325    reviewed and consistent   utd Urine drug screen    HM:  To get booster covid - scheduled  cscope - to be scheduled per pt   mmg schedules    Review of Systems      Objective:  Vitals:    04/29/22 1236   BP: (!) 142/80   Pulse: 81   SpO2: 100%   Height: 5' 6" (1.676 m)     Body mass index is 33.89 kg/m².    Physical Exam  Vitals reviewed.   Constitutional:       Appearance: Normal appearance. She is well-developed.   HENT:      Head: Normocephalic and atraumatic.      Nose:      Comments: Wearing mask   Eyes:      Extraocular Movements: Extraocular movements intact.      Conjunctiva/sclera: Conjunctivae normal.   Neck:      Thyroid: No thyromegaly.   Cardiovascular:      Rate and Rhythm: Normal rate and regular rhythm.      Pulses: Normal pulses.      Heart sounds: Normal heart sounds.   Pulmonary:      Effort: Pulmonary effort is normal.      Breath sounds: Normal breath sounds.   Abdominal:      General: Bowel sounds are normal.      Palpations: Abdomen is soft.   Musculoskeletal:         General: Swelling present. No tenderness.      Cervical back: Neck supple.      Comments: Bilateral ankle edema - chronic - improved from lcv   No redness, inc warmth or redness "   Lymphadenopathy:      Cervical: No cervical adenopathy.   Skin:     General: Skin is warm and dry.      Capillary Refill: Capillary refill takes less than 2 seconds.   Neurological:      General: No focal deficit present.      Mental Status: She is alert and oriented to person, place, and time. Mental status is at baseline.   Psychiatric:         Behavior: Behavior normal.         Thought Content: Thought content normal.         Assessment:  1. Hypertension, benign    2. Screening for colon cancer    3. Seasonal allergic rhinitis due to pollen    4. Chronic combined systolic and diastolic CHF (congestive heart failure)    5. Chronic hypoxemic respiratory failure    6. Chronic obstructive pulmonary disease, unspecified COPD type    7. Stage 3 chronic kidney disease, unspecified whether stage 3a or 3b CKD    8. Generalized anxiety disorder    9. Essential hypertension    10. History of lung cancer    11. Hyperlipidemia, unspecified hyperlipidemia type    12. Hypocalcemia    13. Hypomagnesemia    14. Tobacco dependence        Plan:  Nalini was seen today for annual exam.    Diagnoses and all orders for this visit:    Hypertension, benign  -     losartan (COZAAR) 50 MG tablet; Take 1 tablet (50 mg total) by mouth once daily.    Screening for colon cancer  -     Case Request Endoscopy: COLONOSCOPY    Seasonal allergic rhinitis due to pollen  -     azelastine (ASTELIN) 137 mcg (0.1 %) nasal spray; 1 spray (137 mcg total) by Nasal route 2 (two) times daily.    Chronic combined systolic and diastolic CHF (congestive heart failure)    Chronic hypoxemic respiratory failure  Stable, cont suppl O2    Chronic obstructive pulmonary disease, unspecified COPD type  Stable, cont meds     Stage 3 chronic kidney disease, unspecified whether stage 3a or 3b CKD  Stable, cont meds   No nsaids     Generalized anxiety disorder  F/u with psych     Essential hypertension  Above goal - see below     History of lung cancer  F/u with rad onc  and h/o   Stop tob!     Hyperlipidemia, unspecified hyperlipidemia type  Stable, cont statin     Hypocalcemia  Cont calcium suppl     Hypomagnesemia  Stable, cont suppl     Tobacco dependence  Counseled to quit     cont meds and supplements   bp above goal - inc arb and will titrate based on response   Low salt diet   To have cscope scheduled - reports GI will reach out per her - order signed  Will reach out to GI about plan per pt - reviewed EUS findings today - MRI 1 y ear   appt with endo today - calcium improved - last prolia inj 4/14/2022  Had hearing test and to start wearing hearing aids   Keep appt with specialists     Health Maintenance   Topic Date Due    Mammogram  09/01/2021    TETANUS VACCINE  01/29/2025    DEXA Scan  04/14/2025    Lipid Panel  02/15/2027    Hepatitis C Screening  Completed

## 2022-04-29 NOTE — PROGRESS NOTES
Subjective:      Chief Complaint: Osteoporosis    HPI: Nalini Varma is a 71 y.o. female who is here for a follow-up evaluation for bones. Last seen 5/2021 by the hospital consult team, though this is her first visit with me.    With regards to osteoporosis:   DX years ago    Pt diagnosed with osteoporosis on bone density scan from:   Fractures: left femur many years ago, pt thinks around 10.  Last bone density: 4/2022. Osteoporosis. -3.4 left fem neck. FRAX 61% major, 48.8% hip  Location BMD T-score Change?   Spine 1.503 2.5 No   Total hip 0.568 -3.5 decline   Fem neck (left) 0.565 -3.4 N/A     Osteoporosis Risk Factor Assessment:  Menopause occurred at mid 40s.    No   Yes  []    [x]  Her menstrual cycles were normal throughout her reproductive life.   []    [x]  Prior use of hormone replacement therapy  []    [x]  Falls over the age of fifty  []    [x]  fractures to her wrist, hip or spine  [x]    []  loss of height of more than 1 1/2 - 2 inches   []    [x]  family history of osteoporosis (mother), stooped posture, or fractures of hip.  []    [x]  Kidney stones, kidney disease. CKD3  []    [x]  Thyroid disease, nodule  []    [x]  Medication exposure: steroids, anticoagulants, proton pump inhibitors or anti-seizure medications. Hx ulcers.  []    [x]  Tobacco use, including use in the past. sometimes  []    [x]  EtOH use  []    [x]  Active malignancy, history of bone malignancy, or prior radiation treatment (hx lung cancer)    Last labs:    Lab Results   Component Value Date    CALCIUM 9.9 04/14/2022    ALBUMIN 3.7 03/18/2022    CREATININE 1.3 04/14/2022    [id number] 51 03/18/2022    PTH 63.0 03/18/2022     Had high PTH, pt denies knowing about hyperparathyroidism    Treatment:  Osteoporosis medications used in the past:    fosamax, didn't work well for her    Current medication: Prolia. Last dose 4/14/2022. Prior dose 2/2021.  Start date: few years ago, then missed a dose. Thinks she missed other doses  over the years.    Vitamin intake:   Calcium: 600 mg daily  Vit D: on something, not sure what dose    Weight bearing exercise?  Not much    Has thyroid nodules   last US 9/2019  2.3 cm left lobe, stable.    FNA done: Yes   benign per patient    Lab Results   Component Value Date    TSH 1.914 02/15/2022    FREET4 1.10 03/24/2021     Today, pt reports feeling okay overall.     Symptoms  No   Yes  [x]    []  Falls or fractures recently  [x]    []  Dizziness, lightheadedness  No dysphagia, trouble swallowing  Breathing okay, on intermittent oxygen      Reviewed past medical, family, social history and updated as appropriate.    Review of Systems  As above    Objective:     Vitals:    04/29/22 1345   BP: (!) 153/71   Pulse: 78     BP Readings from Last 5 Encounters:   04/29/22 (!) 142/80   04/25/22 (!) 162/76   03/18/22 138/62   09/07/21 128/60   08/05/21 123/74       Physical Exam  Vitals reviewed.   Constitutional:       General: She is not in acute distress.     Appearance: She is obese.   Neck:      Comments: +thyroid nodule, nontender  Cardiovascular:      Heart sounds: Normal heart sounds.   Pulmonary:      Effort: Pulmonary effort is normal.         Wt Readings from Last 10 Encounters:   04/25/22 1258 95.3 kg (210 lb)   03/18/22 1319 98.7 kg (217 lb 9.5 oz)   09/05/21 0615 86.6 kg (190 lb 14.7 oz)   08/15/21 0602 97 kg (213 lb 13.5 oz)   08/08/21 0100 94.4 kg (208 lb 1.8 oz)   08/05/21 2150 94.3 kg (207 lb 14.3 oz)   07/28/21 2338 95.3 kg (210 lb)   07/01/21 1432 95.3 kg (210 lb)   05/03/21 0800 97.5 kg (215 lb)   05/03/21 0400 97.6 kg (215 lb 2.7 oz)   04/30/21 0919 90 kg (198 lb 6.6 oz)   04/29/21 1305 95.3 kg (210 lb)   03/27/21 1438 103.7 kg (228 lb 9.9 oz)   03/23/21 1632 95.3 kg (210 lb)   03/22/21 0548 95.3 kg (210 lb)   03/23/21 1802 95.3 kg (210 lb)   03/02/21 1538 95.2 kg (209 lb 14.1 oz)   03/01/21 2008 95.3 kg (210 lb)   10/13/20 1408 108 kg (238 lb)       Lab Results   Component Value Date    HGBA1C  5.0 02/15/2022     Lab Results   Component Value Date    CHOL 123 02/15/2022    HDL 48 02/15/2022    LDLCALC 57.6 (L) 02/15/2022    TRIG 87 02/15/2022    CHOLHDL 39.0 02/15/2022     Lab Results   Component Value Date     04/14/2022    K 4.7 04/14/2022    CL 97 04/14/2022    CO2 29 04/14/2022     (H) 04/14/2022    BUN 33 (H) 04/14/2022    CREATININE 1.3 04/14/2022    CALCIUM 9.9 04/14/2022    PROT 7.3 03/18/2022    ALBUMIN 3.7 03/18/2022    BILITOT 0.4 03/18/2022    ALKPHOS 85 03/18/2022    AST 16 03/18/2022    ALT 14 03/18/2022    ANIONGAP 12 04/14/2022    ESTGFRAFRICA 47 (A) 04/14/2022    EGFRNONAA 41 (A) 04/14/2022    TSH 1.914 02/15/2022        Assessment/Plan:     Osteoporosis  Seen on DXA years ago   on prolia   continue. Due around October 2022   can consider other medication if needed, though currently with CKD options are limited.     As well, pt notes hx radiation treatment to lungs, which would include some bones, so avoid tymlos and forteo   continue vitamin D and calcium intake   avoid falls   repeat DXA after 2 years      Multiple thyroid nodules  Last US a few years ago   no compressive symptoms   benign FNA on largest nodule   repeat US when able to re-evaluate   if stable, consider checking from time to time   if increasing a lot, may consider repeat FNA.   last TSH normal      Essential hypertension  bp high today   was okay last month   continue meds   f/u with PCP, monitor BP    Class 1 obesity with serious comorbidity and body mass index (BMI) of 34.0 to 34.9 in adult  Body mass index is 34.52 kg/m².   complicated by HTN   monitor weight        Follow up in about 1 year (around 4/29/2023) for lab review, further monitoring.      Mark Flores MD  Endocrinology

## 2022-04-29 NOTE — PROGRESS NOTES
The patient location is: home  The chief complaint leading to consultation is: follow up for anemia    Visit type: audiovisual    Face to Face time with patient: 20 min  30 minutes of total time spent on the encounter, which includes face to face time and non-face to face time preparing to see the patient (eg, review of tests), Obtaining and/or reviewing separately obtained history, Documenting clinical information in the electronic or other health record, Independently interpreting results (not separately reported) and communicating results to the patient/family/caregiver, or Care coordination (not separately reported).         Each patient to whom he or she provides medical services by telemedicine is:  (1) informed of the relationship between the physician and patient and the respective role of any other health care provider with respect to management of the patient; and (2) notified that he or she may decline to receive medical services by telemedicine and may withdraw from such care at any time.    Notes:             PROGRESS NOTE     Subjective:       Patient ID: Nalini Varma is a 71 y.o. female.     Chief Complaint: follow up for chronic anemia     Diagnosis:  1. Anemia of chronic disease  2. Stage I Lung adenocarcinoma of right upper lobe, lepidic pattern, s/p SBRT in Oct/Nov 2021     Oncologic History:  1. Ms Varma is a 70 yo woman with chronic pain, anxiety, COPD on 2 liters of home oxygen, CHF with preserved ejection fraction, HTN, CKD stage 3 (creatinine 1.6), who presents for further evaluation of newly diagnosed lung adenocarcinoma. She underwent a screening CT chest on 7/23/20, which showed a 0.6 cm right upper lobe noncalcified pulmonary nodule which measured 0.4 cm on previous CT dated 03/20/2019.  There is a pleural based 1.2 cm pulmonary nodule within the right upper lobe which measured 0.9 cm on previous CT.  There is a 1.2 cm partially spiculated right upper lobe noncalcified pulmonary  "nodule which measured 0.6 cm on previous CT. She underwent a right lung biopsy on 2020. Pathology showed adenocarcinoma, well differentiated, lepidic pattern, cannot rule out an invasive component. She presents today for further evaluation. Has been smoking 2PPD for 50 years. Enrolled in smoking cessation program. Now down to 1PPD. Has been on 2L O2 for many years. Needs to use 3 liters when she is wearing a mask. Has CHF. On torsemide. Uses two pillows at night. Unable to walk for a block due to dyspnea. No weight loss. Lives by herself  2. CT head negative for metastases. PET scan 10/7/20: Two right upper lobe nodule with no appreciable activity.  Please note that FDG PET has poor sensitivity for small, well differentiated, and/or indolent malignancies.  An additional subcentimeter node in the apex is too small to characterize by PET for which attention on follow-up is recommended. Mildly enlarged mediastinal lymph nodes, as above, with no hypermetabolic activity.  Metastatic involvement is not excluded.  3. Seen by Dr Mead. Underwent SBRT 10/29/21-21  4. Hospitalized in early 2021 for melena. EGD showed residual food. Otherwise normal. Patient was advised to f/u with GI in 3 weeks. Anemia was stable throughout hospitalization.      Interval History:   Ms Varma returns to discuss test results. Chronic dyspnea with exertion. Wearing oxygen. She was started on reglan in the hospital. Complains of "shaking" since she started reglan.      ECO     ROS:   A ten-point system review is obtained and negative except for what was stated in the Interval History.      Physical Examination:   General: well hydrated, well developed, in no acute distress, wearing oxygen  HEENT: normocephalic, PERRLA, EOMI, anicteric sclerae  Psych: pleasant and appropriate mood and affect     Objective:      Laboratory Data:  Labs reviewed.      Imaging Data:  PET scan 10/7/2020:  Impression:     1. Two right upper lobe " nodule with no appreciable activity.  Please note that FDG PET has poor sensitivity for small, well differentiated, and/or indolent malignancies.  An additional subcentimeter node in the apex is too small to characterize by PET for which attention on follow-up is recommended.  2. Mildly enlarged mediastinal lymph nodes, as above, with no hypermetabolic activity.  Metastatic involvement is not excluded.  3. Additional CT findings, as above.     CT head 9/25/20:  Impression:     No evidence of acute intracranial pathology.     Moderate patchy mucoperiosteal thickening in the paranasal sinuses.    CT chest 2/11/2021:  Development of central cavitation in previously described 12 mm right upper lobe pulmonary nodule which may be inflammatory in nature including tuberculous and non tuberculous mycobacterial infection, fungal infection etc.  Neoplasm is also included in the differential.     Improved subpleural right upper lobe nodular opacity and stable ground-glass nodule right apex.     Stable calcified granuloma and postinflammatory changes in the lingula probably related to previous non tuberculous mycobacterial infection.     Severe atherosclerosis and severe hepatic steatosis        Assessment and Plan:      1. Pancreatic lesion    2. History of lung cancer    3. Iron deficiency anemia due to chronic blood loss        Chronic anemia  - reviewed lab results with patient. Ferritin, B12, folate levels are good. T sat is low at 13. Anemia is 2/2 her medical comorbidities. Hb stable. No need for transfusion or other intervention for now    Pancreatic lesion- being followed by Dr Cheema.     Hx of lung cancer   - followed by Dr Mead. S/p SBRT in Oct/Nov 2020. F/u CT scans have been unremarkable. Scheduled for CT chest and f/u appt with Dr Mead in July.      Orders Placed This Encounter    Reticulocytes    CBC Auto Differential    Ferritin    Iron and TIBC    CANCER ANTIGEN 19-9    Comprehensive Metabolic Panel        Future Appointments   Date Time Provider Department Center   5/10/2022 12:30 PM MODERNA VACCINE, [facility] PRIMARY CARE RETAIL PHARMACY [facility] PHARMIM L[facility] j[facility]cee [facility]   5/10/2022  1:00 PM Parvez Ortez MD [facility] CARDIO [facility]   5/31/2022 12:30 PM [facility] MAMMO2 BX BAP MAMMO [facility]   6/17/2022  1:00 PM JESE [facility] PSYCH [facility]   7/1/2022 12:00 PM [facility] BCC CT1 [facility] CT RAFAL [facility]   7/1/2022  1:30 PM Casey Meda MD [facility] RADONC3 [facility]   7/26/2022 11:00 AM LAB, [facility] LABDRAW [facility]   7/28/2022 11:30 AM Ann Gilmore MD [facility] HEM ONC [facility]   8/4/2022 11:15 AM [facility] USOP2 BAP USOUNDO [facility]   8/4/2022 12:00 PM LAB, [facility] LABDRAW [facility]   8/4/2022  1:00 PM Yane Sahni MD [facility] IM [facility]

## 2022-04-29 NOTE — PATIENT INSTRUCTIONS
Continue calcium and vitamin D    Avoid falls    Continue prolia    Recheck neck ultrasound for thyroid nodule when able.

## 2022-04-29 NOTE — ASSESSMENT & PLAN NOTE
Seen on DXA years ago   on prolia   continue. Due around October 2022   can consider other medication if needed, though currently with CKD options are limited.     As well, pt notes hx radiation treatment to lungs, which would include some bones, so avoid tymlos and forteo   continue vitamin D and calcium intake   avoid falls   repeat DXA after 2 years

## 2022-04-29 NOTE — ASSESSMENT & PLAN NOTE
Last US a few years ago   no compressive symptoms   benign FNA on largest nodule   repeat US when able to re-evaluate   if stable, consider checking from time to time   if increasing a lot, may consider repeat FNA.   last TSH normal

## 2022-05-02 PROBLEM — N18.30 CKD (CHRONIC KIDNEY DISEASE) STAGE 3, GFR 30-59 ML/MIN: Status: ACTIVE | Noted: 2017-01-01

## 2022-05-04 ENCOUNTER — TELEPHONE (OUTPATIENT)
Dept: GASTROENTEROLOGY | Facility: CLINIC | Age: 72
End: 2022-05-04
Payer: MEDICARE

## 2022-05-04 NOTE — TELEPHONE ENCOUNTER
Spoke with Mrs. Varma and explained again the results of her recent EUS. The patient have a few small pancreas cyst which are highly suspicious for branched IPMN with no worrisome endosonographic features. This type of pancreas cyst have a low potential for malignant transformation, therefore we recommend an MRCP in 1 year for surveillance. This will be schedule by our office. All questions answer.

## 2022-05-19 ENCOUNTER — PATIENT MESSAGE (OUTPATIENT)
Dept: INTERNAL MEDICINE | Facility: CLINIC | Age: 72
End: 2022-05-19
Payer: MEDICARE

## 2022-05-19 DIAGNOSIS — M17.0 PRIMARY OSTEOARTHRITIS OF BOTH KNEES: ICD-10-CM

## 2022-05-19 DIAGNOSIS — G89.4 CHRONIC PAIN SYNDROME: ICD-10-CM

## 2022-05-19 RX ORDER — HYDROCODONE BITARTRATE AND ACETAMINOPHEN 10; 325 MG/1; MG/1
1 TABLET ORAL EVERY 12 HOURS PRN
Qty: 60 TABLET | Refills: 0 | Status: SHIPPED | OUTPATIENT
Start: 2022-05-19 | End: 2022-06-17 | Stop reason: SDUPTHER

## 2022-05-19 NOTE — TELEPHONE ENCOUNTER
No new care gaps identified.  [facility] Embedded Care Gaps. Reference number: [id number]. 5/19/2022   7:32:36 AM CDT

## 2022-05-25 ENCOUNTER — PATIENT MESSAGE (OUTPATIENT)
Dept: ENDOSCOPY | Facility: HOSPITAL | Age: 72
End: 2022-05-25
Payer: MEDICARE

## 2022-05-25 DIAGNOSIS — Z12.11 SPECIAL SCREENING FOR MALIGNANT NEOPLASMS, COLON: Primary | ICD-10-CM

## 2022-05-25 RX ORDER — POLYETHYLENE GLYCOL 3350, SODIUM SULFATE ANHYDROUS, SODIUM BICARBONATE, SODIUM CHLORIDE, POTASSIUM CHLORIDE 236; 22.74; 6.74; 5.86; 2.97 G/4L; G/4L; G/4L; G/4L; G/4L
4 POWDER, FOR SOLUTION ORAL ONCE
Qty: 4000 ML | Refills: 0 | Status: SHIPPED | OUTPATIENT
Start: 2022-05-25 | End: 2022-06-04

## 2022-06-03 ENCOUNTER — PATIENT MESSAGE (OUTPATIENT)
Dept: ENDOSCOPY | Facility: HOSPITAL | Age: 72
End: 2022-06-03
Payer: MEDICARE

## 2022-06-03 ENCOUNTER — TELEPHONE (OUTPATIENT)
Dept: ENDOSCOPY | Facility: HOSPITAL | Age: 72
End: 2022-06-03
Payer: MEDICARE

## 2022-06-03 NOTE — TELEPHONE ENCOUNTER
Telephoned pt to see if she could arrive at 10:30am for an 11:30am procedure on 6/8/22. Spoke with pt and she agreed she could come earlier.  She stated she was going to call today to discuss medical transport.  Medical transport has been arranged, however, pt stated they would not come in to accept her discharge instructions and accept care of her upon discharge.  I told her unfortunately moving forward we can no longer discharge patients without a responsible party assuming care and responsibility for them after anesthesia.  She stated last time the Dr. Cheema approved for her to do this and became very angry.  I emailed the pt a brochure for [facility], a local an outside service patients can call to arrange a ride the day of their procedure if they do not have family member/friend to accompany them. I asked the patient to please reach out if this service is unable to help her and we can see what other options may be available to help her get her procedure done and her home safely afterwards.  Updated instructions sent via portal.

## 2022-06-06 NOTE — TELEPHONE ENCOUNTER
Helio Cheema MD  You 3 hours ago (11:21 AM)         I am ok with the transportation.   R    Message text

## 2022-06-08 ENCOUNTER — ANESTHESIA EVENT (OUTPATIENT)
Dept: ENDOSCOPY | Facility: HOSPITAL | Age: 72
End: 2022-06-08
Payer: MEDICARE

## 2022-06-08 ENCOUNTER — ANESTHESIA (OUTPATIENT)
Dept: ENDOSCOPY | Facility: HOSPITAL | Age: 72
End: 2022-06-08
Payer: MEDICARE

## 2022-06-08 ENCOUNTER — HOSPITAL ENCOUNTER (OUTPATIENT)
Facility: HOSPITAL | Age: 72
Discharge: HOME | End: 2022-06-08
Attending: INTERNAL MEDICINE | Admitting: INTERNAL MEDICINE
Payer: MEDICARE

## 2022-06-08 VITALS
TEMPERATURE: 98 F | SYSTOLIC BLOOD PRESSURE: 170 MMHG | HEART RATE: 89 BPM | BODY MASS INDEX: 32.96 KG/M2 | OXYGEN SATURATION: 100 % | RESPIRATION RATE: 20 BRPM | WEIGHT: 210 LBS | HEIGHT: 67 IN | DIASTOLIC BLOOD PRESSURE: 82 MMHG

## 2022-06-08 DIAGNOSIS — Z86.010 HISTORY OF COLONIC POLYPS: Primary | ICD-10-CM

## 2022-06-08 DIAGNOSIS — K63.5 COLON POLYP: ICD-10-CM

## 2022-06-08 PROCEDURE — D9220A PRA ANESTHESIA: Mod: PT,CRNA,, | Performed by: NURSE ANESTHETIST, CERTIFIED REGISTERED

## 2022-06-08 PROCEDURE — D9220A PRA ANESTHESIA: ICD-10-PCS | Mod: PT,ANES,, | Performed by: ANESTHESIOLOGY

## 2022-06-08 PROCEDURE — 25000003 PHARM REV CODE 250: Performed by: INTERNAL MEDICINE

## 2022-06-08 PROCEDURE — 45380 PR COLONOSCOPY,BIOPSY: ICD-10-PCS | Mod: PT,,, | Performed by: INTERNAL MEDICINE

## 2022-06-08 PROCEDURE — D9220A PRA ANESTHESIA: ICD-10-PCS | Mod: PT,CRNA,, | Performed by: NURSE ANESTHETIST, CERTIFIED REGISTERED

## 2022-06-08 PROCEDURE — 25000003 PHARM REV CODE 250: Performed by: NURSE ANESTHETIST, CERTIFIED REGISTERED

## 2022-06-08 PROCEDURE — 37000009 HC ANESTHESIA EA ADD 15 MINS: Performed by: INTERNAL MEDICINE

## 2022-06-08 PROCEDURE — 63600175 PHARM REV CODE 636 W HCPCS: Performed by: NURSE ANESTHETIST, CERTIFIED REGISTERED

## 2022-06-08 PROCEDURE — 88305 TISSUE EXAM BY PATHOLOGIST: ICD-10-PCS | Mod: 26,,, | Performed by: PATHOLOGY

## 2022-06-08 PROCEDURE — D9220A PRA ANESTHESIA: Mod: PT,ANES,, | Performed by: ANESTHESIOLOGY

## 2022-06-08 PROCEDURE — 88305 TISSUE EXAM BY PATHOLOGIST: CPT | Mod: 26,,, | Performed by: PATHOLOGY

## 2022-06-08 PROCEDURE — 88305 TISSUE EXAM BY PATHOLOGIST: CPT | Performed by: PATHOLOGY

## 2022-06-08 PROCEDURE — 45380 COLONOSCOPY AND BIOPSY: CPT | Mod: PT | Performed by: INTERNAL MEDICINE

## 2022-06-08 PROCEDURE — 45380 COLONOSCOPY AND BIOPSY: CPT | Mod: PT,,, | Performed by: INTERNAL MEDICINE

## 2022-06-08 PROCEDURE — 37000008 HC ANESTHESIA 1ST 15 MINUTES: Performed by: INTERNAL MEDICINE

## 2022-06-08 PROCEDURE — 27201012 HC FORCEPS, HOT/COLD, DISP: Performed by: INTERNAL MEDICINE

## 2022-06-08 RX ORDER — PROPOFOL 10 MG/ML
VIAL (ML) INTRAVENOUS CONTINUOUS PRN
Status: DISCONTINUED | OUTPATIENT
Start: 2022-06-08 | End: 2022-06-08

## 2022-06-08 RX ORDER — ONDANSETRON 2 MG/ML
4 INJECTION INTRAMUSCULAR; INTRAVENOUS DAILY PRN
Status: DISCONTINUED | OUTPATIENT
Start: 2022-06-08 | End: 2022-06-08 | Stop reason: HOSPADM

## 2022-06-08 RX ORDER — SODIUM CHLORIDE 0.9 % (FLUSH) 0.9 %
10 SYRINGE (ML) INJECTION
Status: DISCONTINUED | OUTPATIENT
Start: 2022-06-08 | End: 2022-06-08 | Stop reason: HOSPADM

## 2022-06-08 RX ORDER — LIDOCAINE HYDROCHLORIDE 20 MG/ML
INJECTION, SOLUTION EPIDURAL; INFILTRATION; INTRACAUDAL; PERINEURAL
Status: DISCONTINUED | OUTPATIENT
Start: 2022-06-08 | End: 2022-06-08

## 2022-06-08 RX ORDER — PROPOFOL 10 MG/ML
VIAL (ML) INTRAVENOUS
Status: DISCONTINUED | OUTPATIENT
Start: 2022-06-08 | End: 2022-06-08

## 2022-06-08 RX ORDER — SODIUM CHLORIDE 9 MG/ML
INJECTION, SOLUTION INTRAVENOUS CONTINUOUS
Status: DISCONTINUED | OUTPATIENT
Start: 2022-06-08 | End: 2022-06-08 | Stop reason: HOSPADM

## 2022-06-08 RX ADMIN — Medication 40 MG: at 11:06

## 2022-06-08 RX ADMIN — LIDOCAINE HYDROCHLORIDE 30 MG: 20 INJECTION, SOLUTION EPIDURAL; INFILTRATION; INTRACAUDAL at 11:06

## 2022-06-08 RX ADMIN — SODIUM CHLORIDE: 0.9 INJECTION, SOLUTION INTRAVENOUS at 11:06

## 2022-06-08 RX ADMIN — PROPOFOL 100 MCG/KG/MIN: 10 INJECTION, EMULSION INTRAVENOUS at 11:06

## 2022-06-08 RX ADMIN — Medication 20 MG: at 11:06

## 2022-06-08 RX ADMIN — Medication 30 MG: at 11:06

## 2022-06-08 NOTE — PLAN OF CARE
Patient is stable and ready for discharge. Instructions and report given to patient and family. Questions answered. Patient tolerating po liquids with no difficulty. Patient has no pain or states it is a tolerable level for them. Anesthesia consent and surgical consent in chart.

## 2022-06-08 NOTE — H&P
History & Physical - Short Stay  Gastroenterology      SUBJECTIVE:     Procedure: Colonoscopy    Chief Complaint/Indication for Procedure: Previous Polyps    History of Present Illness:  Patient is a 71 y.o. female presents with history of colon polyps here for surveillance.    PTA Medications   Medication Sig    acetaminophen (TYLENOL) 500 MG tablet Take 2 tablets (1,000 mg total) by mouth every 8 (eight) hours as needed. (Patient not taking: Reported on 4/29/2022)    albuterol (VENTOLIN HFA) 90 mcg/actuation inhaler inhale 1-2 puffs as needed every 6 hours for wheezing and shortness of breath (Patient taking differently: inhale 1-2 puffs as needed every 6 hours for wheezing and shortness of breath)    atorvastatin (LIPITOR) 40 MG tablet TAKE 1 TABLET BY MOUTH EVERY DAILY    azelastine (ASTELIN) 137 mcg (0.1 %) nasal spray 1 spray (137 mcg total) by Nasal route 2 (two) times daily.    b complex vitamins capsule Take 1 capsule by mouth once daily.    calcium carbonate (OS-SANDRINE) 500 mg calcium (1,250 mg) tablet Take 2 tablets (1,000 mg total) by mouth 2 (two) times daily.    cyanocobalamin, vitamin B-12, 1,000 mcg TbSR Take 1,000 mcg by mouth once daily.    denosumab (PROLIA) 60 mg/mL Syrg Inject 1 mL (60 mg total) into the skin every 6 (six) months.    fluticasone (FLONASE) 50 mcg/actuation nasal spray 1 spray by Each Nare route 2 (two) times daily as needed for Rhinitis.    fluticasone propion-salmeterol 115-21 mcg/dose (ADVAIR HFA) 115-21 mcg/actuation HFAA inhaler Inhale 2 puffs into the lungs every 12 (twelve) hours.    gabapentin (NEURONTIN) 300 MG capsule Take 1 capsule (300 mg total) by mouth 3 (three) times daily.    guaiFENesin (MUCINEX) 600 mg 12 hr tablet Take 1,200 mg by mouth 2 (two) times daily as needed for Congestion.    HYDROcodone-acetaminophen (NORCO)  mg per tablet Take 1 tablet by mouth every 12 (twelve) hours as needed for Pain.    losartan (COZAAR) 50 MG tablet Take 1 tablet  (50 mg total) by mouth once daily.    magnesium oxide (MAG-OX) 400 mg (241.3 mg magnesium) tablet Take 1 tablet by mouth every 12 (twelve) hours.    metoprolol succinate (TOPROL-XL) 25 MG 24 hr tablet Take 2 tablets (50 mg total) by mouth once daily.    multivit-min/iron/folic/lutein (CENTRUM SILVER WOMEN ORAL) Take 1 tablet by mouth once daily.    pantoprazole (PROTONIX) 40 MG tablet Take 1 tablet (40 mg total) by mouth once daily.    torsemide (DEMADEX) 20 MG Tab Take 1 tablet (20 mg total) by mouth once daily. (Patient taking differently: Take 20 mg by mouth once daily. Pt has been taking 2 BID daily for last few days)    umeclidinium (INCRUSE ELLIPTA) 62.5 mcg/actuation inhalation capsule Inhale 1 puff into the lungs once daily. Controller    vitamin D (VITAMIN D3) 1000 units Tab Take 1 tablet (1,000 Units total) by mouth once daily.    ZINC ORAL Take by mouth.       Review of patient's allergies indicates:   Allergen Reactions    Wellbutrin [bupropion hcl] Other (See Comments)     Hyponatremia and hypomagnesia     Zoloft [sertraline] Other (See Comments)     Hyponatremia and hypomagnesia     Bananas [banana]      Emesis, and stomach cramps        Past Medical History:   Diagnosis Date    Anxiety     Cervical spinal stenosis     Choledocholithiasis     Chronic obstructive pulmonary disease 03/16/2016    Degenerative disc disease of cervical spine     Diverticulosis 04/24/2018    History of alcohol abuse 03/02/2021    History of gastric ulcer     History of L3 compression fracture 12/19/2018    History of lung cancer     s/p completion of XRT in 10/2020    Hyperlipidemia     Iron deficiency anemia     Osteoarthritis     Stage III CKD 2017     Past Surgical History:   Procedure Laterality Date    BREAST CYST EXCISION Right     1967    CATARACT EXTRACTION      COLONOSCOPY  2015    CORONARY ANGIOGRAPHY N/A 7/20/2018    Procedure: ANGIOGRAM, CORONARY ARTERY;  Surgeon: Willard Roberts,  MD;  Location: [facility] CATH LAB;  Service: Cardiovascular;  Laterality: N/A;    ENDOSCOPIC ULTRASOUND OF UPPER GASTROINTESTINAL TRACT N/A 3/24/2021    Procedure: ULTRASOUND, UPPER GI TRACT, ENDOSCOPIC;  Surgeon: Helio Cheema MD;  Location: [facility] ENDO (2ND FLR);  Service: Endoscopy;  Laterality: N/A;    ENDOSCOPIC ULTRASOUND OF UPPER GASTROINTESTINAL TRACT N/A 4/25/2022    Procedure: ULTRASOUND, UPPER GI TRACT, ENDOSCOPIC;  Surgeon: Helio Cheema MD;  Location: [facility] ENDO (2ND FLR);  Service: Endoscopy;  Laterality: N/A;  cardiac risk assessment 1 per Dr. Ortez. see t/e 3/17-SC  3/25:new instructions via portal. home with medical transport?-SC    ERCP N/A 3/24/2021    Procedure: ERCP (ENDOSCOPIC RETROGRADE CHOLANGIOPANCREATOGRAPHY);  Surgeon: Helio Cheema MD;  Location: [facility] (2ND FLR);  Service: Endoscopy;  Laterality: N/A;    ERCP N/A 4/30/2021    Procedure: ERCP (ENDOSCOPIC RETROGRADE CHOLANGIOPANCREATOGRAPHY);  Surgeon: Boo Gray MD;  Location: [facility] (2ND FLR);  Service: Endoscopy;  Laterality: N/A;    ERCP N/A 7/1/2021    Procedure: ERCP (ENDOSCOPIC RETROGRADE CHOLANGIOPANCREATOGRAPHY);  Surgeon: Helio Cheema MD;  Location: [facility] ENDO (2ND FLR);  Service: Endoscopy;  Laterality: N/A;  Ok for Taxi. Dr Cheema  rapid covid 1130am- tb inst email    ESOPHAGOGASTRODUODENOSCOPY  2015    ESOPHAGOGASTRODUODENOSCOPY N/A 3/4/2021    Procedure: EGD (ESOPHAGOGASTRODUODENOSCOPY);  Surgeon: Yenifer Ramirez MD;  Location: [facility] ENDO;  Service: Endoscopy;  Laterality: N/A;    ESOPHAGOGASTRODUODENOSCOPY N/A 3/24/2021    Procedure: EGD (ESOPHAGOGASTRODUODENOSCOPY);  Surgeon: Helio Cheema MD;  Location: [facility] ENDO (2ND FLR);  Service: Endoscopy;  Laterality: N/A;    EYE SURGERY  2016    Cataracts    FRACTURE SURGERY  2014    JOINT REPLACEMENT  2007    ORIF FEMUR FRACTURE Left     TOTAL KNEE ARTHROPLASTY Left 2007    TUBAL LIGATION       Family History   Problem Relation Age of  Onset    Hypertension Mother     Alzheimer's disease Mother     Dementia Mother     Depression Mother         Alzheimers    Myasthenia gravis Father     Arthritis Father         Myasthenia Gravis    Rectal cancer Father     Hypertension Brother     Arthritis Brother         Colon cancer, obese, high blood pressure    Colon cancer Brother     No Known Problems Son     Cancer Sister         Brain cancer    Miscarriages / Stillbirths Sister     Melanoma Neg Hx     Breast cancer Neg Hx      Social History     Tobacco Use    Smoking status: Former Smoker     Packs/day: 1.50     Years: 50.00     Pack years: 75.00     Types: Cigarettes    Smokeless tobacco: Never Used   Substance Use Topics    Alcohol use: Yes     Alcohol/week: 7.0 standard drinks     Types: 7 Shots of liquor per week     Comment: at least 1 cocktail a day    Drug use: No       Review of Systems:  Constitutional: no fever or chills  Respiratory: positive for cough  Cardiovascular: no chest pain or palpitations    OBJECTIVE:     Vital Signs (Most Recent)       Physical Exam:  General: well developed, well nourished  Lungs:  normal respiratory effort  Heart: regular rate, S1, S2 normal    Laboratory  CBC: No results for input(s): WBC, RBC, HGB, HCT, PLT, MCV, MCH, MCHC in the last 168 hours.  CMP: No results for input(s): GLU, CALCIUM, ALBUMIN, PROT, NA, K, CO2, CL, BUN, CREATININE, ALKPHOS, ALT, AST, BILITOT in the last 168 hours.  Coagulation: No results for input(s): LABPROT, INR, APTT in the last 168 hours.    Diagnostic Results:      ASSESSMENT/PLAN:     Previous colon polyps    Plan: Colonoscopy    Anesthesia Plan: MAC    ASA Grade: ASA 3 - Patient with moderate systemic disease with functional limitations     The impression and plan was discussed in detail with the patient. All questions have been answered and the patient voices understanding of our plan at this point. The risk of the procedure was discussed in detail which includes  but not limited to bleeding, infection, perforation in some cases requiring surgery with its spectrum of complications.

## 2022-06-08 NOTE — ANESTHESIA POSTPROCEDURE EVALUATION
Anesthesia Post Evaluation    Patient: Nalini Varma    Procedure(s) Performed: Procedure(s) (LRB):  COLONOSCOPY (N/A)    Final Anesthesia Type: general      Patient location during evaluation: PACU  Patient participation: Yes- Able to Participate  Level of consciousness: awake and alert  Post-procedure vital signs: reviewed and stable  Pain management: adequate  Airway patency: patent    PONV status at discharge: No PONV  Anesthetic complications: no      Cardiovascular status: blood pressure returned to baseline  Respiratory status: unassisted, spontaneous ventilation and room air  Hydration status: euvolemic  Follow-up not needed.          Vitals Value Taken Time   /82 06/08/22 1246   Temp 36.4 °C (97.5 °F) 06/08/22 1247   Pulse 91 06/08/22 1249   Resp 53 06/08/22 1249   SpO2 100 % 06/08/22 1249   Vitals shown include unvalidated device data.      No case tracking events are documented in the log.      Pain/Muriel Score: Muriel Score: 9 (6/8/2022 12:10 PM)

## 2022-06-08 NOTE — BRIEF OP NOTE
Discharge Summary/Instructions after an Endoscopic Procedure    Patient Name: Nalini Varma  Patient MRN: [id number]  Patient YOB: 1950    Wednesday, June 08, 2022  Helio Cheema MD    Dear patient,  As a result of recent federal legislation (The Federal Cures Act), you may receive lab or pathology results from your procedure in your [facility] account before your physician is able to contact you. Your physician or their representative will relay the results to you with their recommendations at their soonest availability.  Thank you,    RESTRICTIONS:  During your procedure today, you received medications for sedation.  These medications may affect your judgment, balance and coordination.  Therefore, for 24 hours, you have the following restrictions:     - DO NOT drive a car, operate machinery, make legal/financial decisions, sign important papers or drink alcohol.      ACTIVITY:  Today: no heavy lifting, straining or running due to procedural sedation/anesthesia.  The following day: return to full activity including work.    DIET:  Eat and drink normally unless instructed otherwise.     TREATMENT FOR COMMON SIDE EFFECTS:  - Mild abdominal pain, nausea, belching, bloating or excessive gas:  rest, eat lightly and use a heating pad.  - Sore Throat: treat with throat lozenges and/or gargle with warm salt water.  - Because air was used during the procedure, expelling large amounts of air from your rectum or belching is normal.  - If a bowel prep was taken, you may not have a bowel movement for 1-3 days.  This is normal.      SYMPTOMS TO WATCH FOR AND REPORT TO YOUR PHYSICIAN:  1. Abdominal pain or bloating, other than gas cramps.  2. Chest pain.  3. Back pain.  4. Signs of infection such as: chills or fever occurring within 24 hours after the procedure.  5. Rectal bleeding, which would show as bright red, maroon, or black stools. (A tablespoon of blood from the rectum is not serious, especially if hemorrhoids are  present.)  6. Vomiting.  7. Weakness or dizziness.      GO DIRECTLY TO THE NEAREST EMERGENCY ROOM IF YOU HAVE ANY OF THE FOLLOWING:     Difficulty breathing              Chills and/or fever over 101 F   Persistent vomiting and/or vomiting blood   Severe abdominal pain   Severe chest pain   Black, tarry stools   Bleeding- more than one tablespoon   Any other symptom or condition that you feel may need urgent attention    Your doctor recommends these additional instructions:  If any biopsies were taken, your doctors clinic will contact you in 1 to 2 weeks with any results.    - Discharge patient to home (ambulatory).   - High fiber diet.   - Await pathology results.   - Repeat colonoscopy in 5 years for surveillance based on pathology results.   - Patient has a contact number available for emergencies.  The signs and symptoms of potential delayed complications were discussed with the patient.  Return to normal activities tomorrow.  Written discharge instructions were provided to the patient.    For questions, problems or results please call your physician - Helio Cheema MD at Work:  [phone].    [facility], EMERGENCY ROOM PHONE NUMBER: [phone]    IF A COMPLICATION OR EMERGENCY SITUATION ARISES AND YOU ARE UNABLE TO REACH YOUR PHYSICIAN - GO DIRECTLY TO THE EMERGENCY ROOM.

## 2022-06-08 NOTE — TRANSFER OF CARE
"Anesthesia Transfer of Care Note    Patient: Nalini Varma    Procedure(s) Performed: Procedure(s) (LRB):  COLONOSCOPY (N/A)    Patient location: [facility]    Anesthesia Type: general    Transport from OR: Transported from OR on 6-10 L/min O2 by face mask with adequate spontaneous ventilation    Post pain: adequate analgesia    Post assessment: no apparent anesthetic complications and tolerated procedure well    Post vital signs: stable    Level of consciousness: awake, oriented and alert    Nausea/Vomiting: no nausea/vomiting    Complications: none    Transfer of care protocol was followed      Last vitals:   Visit Vitals  BP (!) 147/71 (BP Location: Left arm, Patient Position: Lying)   Pulse 98   Temp 36.9 °C (98.5 °F) (Temporal)   Resp 18   Ht 5' 6.5" (1.689 m)   Wt 95.3 kg (210 lb)   SpO2 98%   BMI 33.39 kg/m²     "

## 2022-06-08 NOTE — [employer] PATIENT INSTRUCTIONS
Discharge Summary/Instructions after an Endoscopic Procedure  Patient Name: Nalini Varma  Patient MRN: [id number]  Patient YOB: 1950  Wednesday, June 08, 2022  Helio Cheema MD  Dear patient,  As a result of recent federal legislation (The Federal Cures Act), you may   receive lab or pathology results from your procedure in your [facility]   account before your physician is able to contact you. Your physician or   their representative will relay the results to you with their   recommendations at their soonest availability.  Thank you,  RESTRICTIONS:  During your procedure today, you received medications for sedation.  These   medications may affect your judgment, balance and coordination.  Therefore,   for 24 hours, you have the following restrictions:   - DO NOT drive a car, operate machinery, make legal/financial decisions,   sign important papers or drink alcohol.    ACTIVITY:  Today: no heavy lifting, straining or running due to procedural   sedation/anesthesia.  The following day: return to full activity including work.  DIET:  Eat and drink normally unless instructed otherwise.     TREATMENT FOR COMMON SIDE EFFECTS:  - Mild abdominal pain, nausea, belching, bloating or excessive gas:  rest,   eat lightly and use a heating pad.  - Sore Throat: treat with throat lozenges and/or gargle with warm salt   water.  - Because air was used during the procedure, expelling large amounts of air   from your rectum or belching is normal.  - If a bowel prep was taken, you may not have a bowel movement for 1-3 days.    This is normal.  SYMPTOMS TO WATCH FOR AND REPORT TO YOUR PHYSICIAN:  1. Abdominal pain or bloating, other than gas cramps.  2. Chest pain.  3. Back pain.  4. Signs of infection such as: chills or fever occurring within 24 hours   after the procedure.  5. Rectal bleeding, which would show as bright red, maroon, or black stools.   (A tablespoon of blood from the rectum is not serious, especially if    hemorrhoids are present.)  6. Vomiting.  7. Weakness or dizziness.  GO DIRECTLY TO THE NEAREST EMERGENCY ROOM IF YOU HAVE ANY OF THE FOLLOWING:      Difficulty breathing              Chills and/or fever over 101 F   Persistent vomiting and/or vomiting blood   Severe abdominal pain   Severe chest pain   Black, tarry stools   Bleeding- more than one tablespoon   Any other symptom or condition that you feel may need urgent attention  Your doctor recommends these additional instructions:  If any biopsies were taken, your doctors clinic will contact you in 1 to 2   weeks with any results.  - Discharge patient to home (ambulatory).   - High fiber diet.   - Await pathology results.   - Repeat colonoscopy in 5 years for surveillance based on pathology results.     - Patient has a contact number available for emergencies.  The signs and   symptoms of potential delayed complications were discussed with the   patient.  Return to normal activities tomorrow.  Written discharge   instructions were provided to the patient.  For questions, problems or results please call your physician - Helio Cheema MD at Work:  [phone].  [facility], EMERGENCY ROOM PHONE NUMBER: [phone]  IF A COMPLICATION OR EMERGENCY SITUATION ARISES AND YOU ARE UNABLE TO REACH   YOUR PHYSICIAN - GO DIRECTLY TO THE EMERGENCY ROOM.  Helio Cheema MD  6/8/2022 12:13:30 PM  This report has been verified and signed electronically.  Dear patient,  As a result of recent federal legislation (The Federal Cures Act), you may   receive lab or pathology results from your procedure in your [facility]   account before your physician is able to contact you. Your physician or   their representative will relay the results to you with their   recommendations at their soonest availability.  Thank you,  PROVATION

## 2022-06-08 NOTE — ANESTHESIA PREPROCEDURE EVALUATION
06/08/2022  Nalini Varma is a 71 y.o., female.  Pre-operative evaluation for Procedure(s) (LRB):  COLONOSCOPY (N/A)        Patient Active Problem List   Diagnosis    Chronic obstructive pulmonary disease    Opioid dependence    Essential hypertension    Iron deficiency anemia    Tobacco dependence    Hyperlipidemia    Osteoporosis    Generalized anxiety disorder    Hypomagnesemia    Pulmonary nodule    Diverticulosis    Chronic combined systolic and diastolic CHF (congestive heart failure)    History of L3 compression fracture    Vitamin D deficiency    Vitamin B12 deficiency    Chronic pain syndrome    History of lung cancer    Chronic GI bleeding    History of alcohol abuse    Choledocholithiasis    Elevated LFTs    Urinary retention    Cervical spinal stenosis    Debility    Chronic hypoxemic respiratory failure    Hyponatremia    Hypocalcemia    Spinal stenosis    Multiple thyroid nodules    Class 1 obesity with serious comorbidity and body mass index (BMI) of 34.0 to 34.9 in adult    CKD (chronic kidney disease) stage 3, GFR 30-59 ml/min       Review of patient's allergies indicates:   Allergen Reactions    Wellbutrin [bupropion hcl] Other (See Comments)     Hyponatremia and hypomagnesia     Zoloft [sertraline] Other (See Comments)     Hyponatremia and hypomagnesia     Bananas [banana]      Emesis, and stomach cramps        No current facility-administered medications on file prior to encounter.     Current Outpatient Medications on File Prior to Encounter   Medication Sig Dispense Refill    acetaminophen (TYLENOL) 500 MG tablet Take 2 tablets (1,000 mg total) by mouth every 8 (eight) hours as needed. (Patient not taking: Reported on 4/29/2022)  0    albuterol (VENTOLIN HFA) 90 mcg/actuation inhaler inhale 1-2 puffs as needed every 6 hours for wheezing and  shortness of breath (Patient taking differently: inhale 1-2 puffs as needed every 6 hours for wheezing and shortness of breath) 25.5 g 11    atorvastatin (LIPITOR) 40 MG tablet TAKE 1 TABLET BY MOUTH EVERY DAILY 90 tablet 3    azelastine (ASTELIN) 137 mcg (0.1 %) nasal spray 1 spray (137 mcg total) by Nasal route 2 (two) times daily. 30 mL 3    b complex vitamins capsule Take 1 capsule by mouth once daily.      calcium carbonate (OS-SANDRINE) 500 mg calcium (1,250 mg) tablet Take 2 tablets (1,000 mg total) by mouth 2 (two) times daily. 30 tablet 0    cyanocobalamin, vitamin B-12, 1,000 mcg TbSR Take 1,000 mcg by mouth once daily.      denosumab (PROLIA) 60 mg/mL Syrg Inject 1 mL (60 mg total) into the skin every 6 (six) months. 2 mL 0    fluticasone (FLONASE) 50 mcg/actuation nasal spray 1 spray by Each Nare route 2 (two) times daily as needed for Rhinitis.      fluticasone propion-salmeterol 115-21 mcg/dose (ADVAIR HFA) 115-21 mcg/actuation HFAA inhaler Inhale 2 puffs into the lungs every 12 (twelve) hours. 36 g 3    gabapentin (NEURONTIN) 300 MG capsule Take 1 capsule (300 mg total) by mouth 3 (three) times daily. 90 capsule 11    guaiFENesin (MUCINEX) 600 mg 12 hr tablet Take 1,200 mg by mouth 2 (two) times daily as needed for Congestion.      losartan (COZAAR) 50 MG tablet Take 1 tablet (50 mg total) by mouth once daily. 90 tablet 3    magnesium oxide (MAG-OX) 400 mg (241.3 mg magnesium) tablet Take 1 tablet by mouth every 12 (twelve) hours. 30 tablet 0    metoprolol succinate (TOPROL-XL) 25 MG 24 hr tablet Take 2 tablets (50 mg total) by mouth once daily. 60 tablet 11    multivit-min/iron/folic/lutein (CENTRUM SILVER WOMEN ORAL) Take 1 tablet by mouth once daily.      pantoprazole (PROTONIX) 40 MG tablet Take 1 tablet (40 mg total) by mouth once daily. 90 tablet 0    torsemide (DEMADEX) 20 MG Tab Take 1 tablet (20 mg total) by mouth once daily. (Patient taking differently: Take 20 mg by mouth once  daily. Pt has been taking 2 BID daily for last few days) 30 tablet 11    umeclidinium (INCRUSE ELLIPTA) 62.5 mcg/actuation inhalation capsule Inhale 1 puff into the lungs once daily. Controller 90 each 3    vitamin D (VITAMIN D3) 1000 units Tab Take 1 tablet (1,000 Units total) by mouth once daily. 30 tablet 2    ZINC ORAL Take by mouth.         Past Surgical History:   Procedure Laterality Date    BREAST CYST EXCISION Right     1967    CATARACT EXTRACTION      COLONOSCOPY  2015    CORONARY ANGIOGRAPHY N/A 7/20/2018    Procedure: ANGIOGRAM, CORONARY ARTERY;  Surgeon: Willard Roberts MD;  Location: [facility] CATH LAB;  Service: Cardiovascular;  Laterality: N/A;    ENDOSCOPIC ULTRASOUND OF UPPER GASTROINTESTINAL TRACT N/A 3/24/2021    Procedure: ULTRASOUND, UPPER GI TRACT, ENDOSCOPIC;  Surgeon: Helio Cheema MD;  [facility]  Service: Endoscopy;  Laterality: N/A;    ENDOSCOPIC ULTRASOUND OF UPPER GASTROINTESTINAL TRACT N/A 4/25/2022    Procedure: ULTRASOUND, UPPER GI TRACT, ENDOSCOPIC;  Surgeon: Helio Cheema MD;  [facility]  Service: Endoscopy;  Laterality: N/A;  cardiac risk assessment 1 per Dr. Ortez. see t/e 3/17-SC  3/25:new instructions via portal. home with medical transport?-SC    ERCP N/A 3/24/2021    Procedure: ERCP (ENDOSCOPIC RETROGRADE CHOLANGIOPANCREATOGRAPHY);  Surgeon: Helio Cheema MD;  Location: [facility] (2ND FLR);  Service: Endoscopy;  Laterality: N/A;    ERCP N/A 4/30/2021    Procedure: ERCP (ENDOSCOPIC RETROGRADE CHOLANGIOPANCREATOGRAPHY);  Surgeon: Boo Gray MD;  Location: [facility] ENDO (2ND FLR);  Service: Endoscopy;  Laterality: N/A;    ERCP N/A 7/1/2021    Procedure: ERCP (ENDOSCOPIC RETROGRADE CHOLANGIOPANCREATOGRAPHY);  Surgeon: Helio Cheema MD;  Location: [facility] ENDO (2ND FLR);  Service: Endoscopy;  Laterality: N/A;  Ok for Taxi. Dr Cheema  rapid covid [id number]- tb inst email    ESOPHAGOGASTRODUODENOSCOPY  2015     ESOPHAGOGASTRODUODENOSCOPY N/A 3/4/2021    Procedure: EGD (ESOPHAGOGASTRODUODENOSCOPY);  Surgeon: Yenifer Ramirez MD;  Location: [facility];  Service: Endoscopy;  Laterality: N/A;    ESOPHAGOGASTRODUODENOSCOPY N/A 3/24/2021    Procedure: EGD (ESOPHAGOGASTRODUODENOSCOPY);  Surgeon: Helio Cheema MD;  [facility]  Service: Endoscopy;  Laterality: N/A;    EYE SURGERY  2016    Cataracts    FRACTURE SURGERY  2014    JOINT REPLACEMENT  2007    ORIF FEMUR FRACTURE Left     TOTAL KNEE ARTHROPLASTY Left 2007    TUBAL LIGATION         Social History     Socioeconomic History    Marital status: Single    Number of children: 2   Occupational History    Occupation: unemployed   Tobacco Use    Smoking status: Former Smoker     Packs/day: 1.50     Years: 50.00     Pack years: 75.00     Types: Cigarettes    Smokeless tobacco: Never Used   Substance and Sexual Activity    Alcohol use: Yes     Alcohol/week: 7.0 standard drinks     Types: 7 Shots of liquor per week     Comment: at least 1 cocktail a day    Drug use: No    Sexual activity: Not Currently     Birth control/protection: Abstinence   Other Topics Concern    Are you pregnant or think you may be? No   Social History Narrative    Originally from [facility] [place] [facility] since 1998    Living in [facility]     Social Determinants of Health     Financial Resource Strain: Medium Risk    Difficulty of Paying Living Expenses: Somewhat hard   Food Insecurity: Food Insecurity Present    Worried About Running Out of Food in the Last Year: Sometimes true    Ran Out of Food in the Last Year: Sometimes true   Transportation Needs: Unmet Transportation Needs    Lack of Transportation (Medical): No    Lack of Transportation (Non-Medical): Yes   Physical Activity: Inactive    Days of Exercise per Week: 0 days    Minutes of Exercise per Session: 0 min   Stress: No Stress Concern Present    Feeling of Stress : Only a little   Social  Connections: Unknown    Frequency of Communication with Friends and Family: Three times a week    Frequency of Social Gatherings with Friends and Family: Never    Active Member of Clubs or Organizations: No    Attends Club or Organization Meetings: Never    Marital Status:    Housing Stability: High Risk    Unable to Pay for Housing in the Last Year: Yes    Number of Places Lived in the Last Year: 4    Unstable Housing in the Last Year: Yes         Vital Signs Range (Last 24H):         CBC: No results for input(s): WBC, RBC, HGB, HCT, PLT, MCV, MCH, MCHC in the last 72 hours.    CMP: No results for input(s): NA, K, CL, CO2, BUN, CREATININE, GLU, MG, PHOS, CALCIUM, ALBUMIN, PROT, ALKPHOS, ALT, AST, BILITOT in the last 72 hours.    INR  No results for input(s): PT, INR, PROTIME, APTT in the last 72 hours.        Diagnostic Studies:      EKG:  Normal sinus rhythm   Nonspecific intraventricular block   Cannot rule out Anteroseptal infarct (cited on or before 28-DEC-2016)   Abnormal ECG   When compared with ECG of 01-MAY-2021 11:53,   The axis Shifted left   T wave inversion more evident in Inferior leads   Confirmed by Patrick SOLOMON, [place] ([id number]) on 7/28/2021 9:02:25 AM     2D Echo:    · The left ventricle is normal in size with moderately decreased systolic function.  · The estimated ejection fraction is 38%.  · There are segmental left ventricular wall motion abnormalities.  · Left ventricular diastolic dysfunction.  · Normal right ventricular size with low normal right ventricular systolic function.  · Mild right atrial enlargement.  · There is mild aortic valve stenosis.  · Aortic valve area is 2.00 cm2; peak velocity is 1.91 m/s; mean gradient is 10 mmHg.  · Normal central venous pressure (3 mmHg).            Pre-op Assessment    I have reviewed the Patient Summary Reports.     I have reviewed the Nursing Notes. I have reviewed the NPO Status.   I have reviewed the Medications.     Review of  Systems  Anesthesia Hx:  No problems with previous Anesthesia  History of prior surgery of interest to airway management or planning: Denies Family Hx of Anesthesia complications.   Denies Personal Hx of Anesthesia complications.   Social:  No Alcohol Use, Former Smoker    Hematology/Oncology:  Hematology Normal   Oncology Normal     EENT/Dental:EENT/Dental Normal   Cardiovascular:   Hypertension CHF    Pulmonary:   COPD 3LO2 ATC   Renal/:   Chronic Renal Disease, CRI    Hepatic/GI:   Colonic polyp   Musculoskeletal:   Arthritis   Spine Disorders:    Neurological:  Neurology Normal    Endocrine:  Endocrine Normal    Dermatological:  Skin Normal    Psych:   Psychiatric History          Physical Exam  General: Well nourished, Cooperative, Alert and Oriented    Airway:  Mallampati: III / I  Mouth Opening: Normal  TM Distance: Normal  Tongue: Normal  Neck ROM: Normal ROM    Dental:  Periodontal disease    Chest/Lungs:  Clear to auscultation, Normal Respiratory Rate    Heart:  Rate: Normal  Rhythm: Regular Rhythm  Sounds: Normal        Anesthesia Plan  Type of Anesthesia, risks & benefits discussed:    Anesthesia Type: Gen ETT, Gen Supraglottic Airway, Gen Natural Airway  Intra-op Monitoring Plan: Standard ASA Monitors  Post Op Pain Control Plan:   (medical reason for not using multimodal pain management)  Induction:  IV  Informed Consent: Informed consent signed with the Patient and all parties understand the risks and agree with anesthesia plan.  All questions answered.   ASA Score: 3  Day of Surgery Review of History & Physical: H&P Update referred to the surgeon/provider.    Ready For Surgery From Anesthesia Perspective.     .

## 2022-06-14 LAB
FINAL PATHOLOGIC DIAGNOSIS: NORMAL
Lab: NORMAL

## 2022-06-15 ENCOUNTER — TELEPHONE (OUTPATIENT)
Dept: ENDOSCOPY | Facility: HOSPITAL | Age: 72
End: 2022-06-15
Payer: MEDICARE

## 2022-06-15 NOTE — TELEPHONE ENCOUNTER
----- Message from Helio Cheema MD sent at 6/15/2022 10:59 AM CDT -----  Please let the patient know the colon polyp was adenoma. No cancer. Colonoscopy in 5 years.

## 2022-06-17 ENCOUNTER — PATIENT MESSAGE (OUTPATIENT)
Dept: INTERNAL MEDICINE | Facility: CLINIC | Age: 72
End: 2022-06-17
Payer: MEDICARE

## 2022-06-17 ENCOUNTER — OFFICE VISIT (OUTPATIENT)
Dept: PSYCHIATRY | Facility: CLINIC | Age: 72
End: 2022-06-17
Payer: COMMERCIAL

## 2022-06-17 VITALS
SYSTOLIC BLOOD PRESSURE: 183 MMHG | WEIGHT: 214.75 LBS | HEART RATE: 87 BPM | BODY MASS INDEX: 34.14 KG/M2 | DIASTOLIC BLOOD PRESSURE: 82 MMHG

## 2022-06-17 DIAGNOSIS — F41.1 GENERALIZED ANXIETY DISORDER: ICD-10-CM

## 2022-06-17 DIAGNOSIS — M17.0 PRIMARY OSTEOARTHRITIS OF BOTH KNEES: ICD-10-CM

## 2022-06-17 DIAGNOSIS — F33.2 MDD (MAJOR DEPRESSIVE DISORDER), RECURRENT SEVERE, WITHOUT PSYCHOSIS: Primary | ICD-10-CM

## 2022-06-17 DIAGNOSIS — E55.9 VITAMIN D DEFICIENCY: ICD-10-CM

## 2022-06-17 DIAGNOSIS — G89.4 CHRONIC PAIN SYNDROME: ICD-10-CM

## 2022-06-17 DIAGNOSIS — F43.10 PTSD (POST-TRAUMATIC STRESS DISORDER): ICD-10-CM

## 2022-06-17 PROCEDURE — 90792 PSYCH DIAG EVAL W/MED SRVCS: CPT | Mod: S$GLB,,, | Performed by: PHYSICIAN ASSISTANT

## 2022-06-17 PROCEDURE — 3044F HG A1C LEVEL LT 7.0%: CPT | Mod: CPTII,S$GLB,, | Performed by: PHYSICIAN ASSISTANT

## 2022-06-17 PROCEDURE — 1160F RVW MEDS BY RX/DR IN RCRD: CPT | Mod: CPTII,S$GLB,, | Performed by: PHYSICIAN ASSISTANT

## 2022-06-17 PROCEDURE — 90792 PR PSYCHIATRIC DIAGNOSTIC EVALUATION W/MEDICAL SERVICES: ICD-10-PCS | Mod: S$GLB,,, | Performed by: PHYSICIAN ASSISTANT

## 2022-06-17 PROCEDURE — 99999 PR PBB SHADOW E&M-EST. PATIENT-LVL IV: CPT | Mod: PBBFAC,,, | Performed by: PHYSICIAN ASSISTANT

## 2022-06-17 PROCEDURE — 3008F BODY MASS INDEX DOCD: CPT | Mod: CPTII,S$GLB,, | Performed by: PHYSICIAN ASSISTANT

## 2022-06-17 PROCEDURE — 3079F PR MOST RECENT DIASTOLIC BLOOD PRESSURE 80-89 MM HG: ICD-10-PCS | Mod: CPTII,S$GLB,, | Performed by: PHYSICIAN ASSISTANT

## 2022-06-17 PROCEDURE — 3044F PR MOST RECENT HEMOGLOBIN A1C LEVEL <7.0%: ICD-10-PCS | Mod: CPTII,S$GLB,, | Performed by: PHYSICIAN ASSISTANT

## 2022-06-17 PROCEDURE — 3077F PR MOST RECENT SYSTOLIC BLOOD PRESSURE >= 140 MM HG: ICD-10-PCS | Mod: CPTII,S$GLB,, | Performed by: PHYSICIAN ASSISTANT

## 2022-06-17 PROCEDURE — 99499 RISK ADDL DX/OHS AUDIT: ICD-10-PCS | Mod: S$GLB,,, | Performed by: PHYSICIAN ASSISTANT

## 2022-06-17 PROCEDURE — 4010F PR ACE/ARB THEARPY RXD/TAKEN: ICD-10-PCS | Mod: CPTII,S$GLB,, | Performed by: PHYSICIAN ASSISTANT

## 2022-06-17 PROCEDURE — 99999 PR PBB SHADOW E&M-EST. PATIENT-LVL IV: ICD-10-PCS | Mod: PBBFAC,,, | Performed by: PHYSICIAN ASSISTANT

## 2022-06-17 PROCEDURE — 1159F PR MEDICATION LIST DOCUMENTED IN MEDICAL RECORD: ICD-10-PCS | Mod: CPTII,S$GLB,, | Performed by: PHYSICIAN ASSISTANT

## 2022-06-17 PROCEDURE — 4010F ACE/ARB THERAPY RXD/TAKEN: CPT | Mod: CPTII,S$GLB,, | Performed by: PHYSICIAN ASSISTANT

## 2022-06-17 PROCEDURE — 3077F SYST BP >= 140 MM HG: CPT | Mod: CPTII,S$GLB,, | Performed by: PHYSICIAN ASSISTANT

## 2022-06-17 PROCEDURE — 3079F DIAST BP 80-89 MM HG: CPT | Mod: CPTII,S$GLB,, | Performed by: PHYSICIAN ASSISTANT

## 2022-06-17 PROCEDURE — 1160F PR REVIEW ALL MEDS BY PRESCRIBER/CLIN PHARMACIST DOCUMENTED: ICD-10-PCS | Mod: CPTII,S$GLB,, | Performed by: PHYSICIAN ASSISTANT

## 2022-06-17 PROCEDURE — 1159F MED LIST DOCD IN RCRD: CPT | Mod: CPTII,S$GLB,, | Performed by: PHYSICIAN ASSISTANT

## 2022-06-17 PROCEDURE — 3008F PR BODY MASS INDEX (BMI) DOCUMENTED: ICD-10-PCS | Mod: CPTII,S$GLB,, | Performed by: PHYSICIAN ASSISTANT

## 2022-06-17 PROCEDURE — 99499 UNLISTED E&M SERVICE: CPT | Mod: S$GLB,,, | Performed by: PHYSICIAN ASSISTANT

## 2022-06-17 RX ORDER — TRAZODONE HYDROCHLORIDE 50 MG/1
50 TABLET ORAL NIGHTLY PRN
Qty: 30 TABLET | Refills: 2 | Status: SHIPPED | OUTPATIENT
Start: 2022-06-17 | End: 2022-10-02

## 2022-06-17 RX ORDER — HYDROCODONE BITARTRATE AND ACETAMINOPHEN 10; 325 MG/1; MG/1
1 TABLET ORAL EVERY 12 HOURS PRN
Qty: 60 TABLET | Refills: 0 | Status: SHIPPED | OUTPATIENT
Start: 2022-06-17 | End: 2022-07-15 | Stop reason: SDUPTHER

## 2022-06-17 RX ORDER — DULOXETIN HYDROCHLORIDE 30 MG/1
30 CAPSULE, DELAYED RELEASE ORAL DAILY
Qty: 30 CAPSULE | Refills: 2 | Status: SHIPPED | OUTPATIENT
Start: 2022-06-17 | End: 2022-08-05
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
## 2022-06-17 NOTE — TELEPHONE ENCOUNTER
No new care gaps identified.  [facility] Embedded Care Gaps. Reference number: [id number]. 6/17/2022   8:09:57 AM JERRODT

## 2022-07-01 ENCOUNTER — OFFICE VISIT (OUTPATIENT)
Dept: RADIATION ONCOLOGY | Facility: CLINIC | Age: 72
End: 2022-07-01
Payer: MEDICARE

## 2022-07-01 ENCOUNTER — HOSPITAL ENCOUNTER (OUTPATIENT)
Dept: RADIOLOGY | Facility: HOSPITAL | Age: 72
Discharge: HOME | End: 2022-07-01
Attending: RADIOLOGY
Payer: MEDICARE

## 2022-07-01 VITALS
WEIGHT: 214 LBS | OXYGEN SATURATION: 96 % | TEMPERATURE: 99 F | SYSTOLIC BLOOD PRESSURE: 125 MMHG | BODY MASS INDEX: 34.39 KG/M2 | HEART RATE: 70 BPM | HEIGHT: 66 IN | DIASTOLIC BLOOD PRESSURE: 59 MMHG | RESPIRATION RATE: 18 BRPM

## 2022-07-01 DIAGNOSIS — C34.12 PRIMARY MALIGNANT NEOPLASM OF LEFT UPPER LOBE OF LUNG: Primary | ICD-10-CM

## 2022-07-01 DIAGNOSIS — Z85.118 HISTORY OF LUNG CANCER: ICD-10-CM

## 2022-07-01 DIAGNOSIS — C34.11 MALIGNANT NEOPLASM OF RIGHT UPPER LOBE OF LUNG: ICD-10-CM

## 2022-07-01 PROCEDURE — 1101F PR PT FALLS ASSESS DOC 0-1 FALLS W/OUT INJ PAST YR: ICD-10-PCS | Mod: CPTII,S$GLB,, | Performed by: RADIOLOGY

## 2022-07-01 PROCEDURE — 1126F PR PAIN SEVERITY QUANTIFIED, NO PAIN PRESENT: ICD-10-PCS | Mod: CPTII,S$GLB,, | Performed by: RADIOLOGY

## 2022-07-01 PROCEDURE — 71250 CT CHEST WITHOUT CONTRAST: ICD-10-PCS | Mod: 26,,, | Performed by: RADIOLOGY

## 2022-07-01 PROCEDURE — 3008F PR BODY MASS INDEX (BMI) DOCUMENTED: ICD-10-PCS | Mod: CPTII,S$GLB,, | Performed by: RADIOLOGY

## 2022-07-01 PROCEDURE — 4010F ACE/ARB THERAPY RXD/TAKEN: CPT | Mod: CPTII,S$GLB,, | Performed by: RADIOLOGY

## 2022-07-01 PROCEDURE — 3044F HG A1C LEVEL LT 7.0%: CPT | Mod: CPTII,S$GLB,, | Performed by: RADIOLOGY

## 2022-07-01 PROCEDURE — 99213 PR OFFICE/OUTPT VISIT, EST, LEVL III, 20-29 MIN: ICD-10-PCS | Mod: S$GLB,,, | Performed by: RADIOLOGY

## 2022-07-01 PROCEDURE — 1159F MED LIST DOCD IN RCRD: CPT | Mod: CPTII,S$GLB,, | Performed by: RADIOLOGY

## 2022-07-01 PROCEDURE — 3074F SYST BP LT 130 MM HG: CPT | Mod: CPTII,S$GLB,, | Performed by: RADIOLOGY

## 2022-07-01 PROCEDURE — 3044F PR MOST RECENT HEMOGLOBIN A1C LEVEL <7.0%: ICD-10-PCS | Mod: CPTII,S$GLB,, | Performed by: RADIOLOGY

## 2022-07-01 PROCEDURE — 1101F PT FALLS ASSESS-DOCD LE1/YR: CPT | Mod: CPTII,S$GLB,, | Performed by: RADIOLOGY

## 2022-07-01 PROCEDURE — 3008F BODY MASS INDEX DOCD: CPT | Mod: CPTII,S$GLB,, | Performed by: RADIOLOGY

## 2022-07-01 PROCEDURE — 3078F DIAST BP <80 MM HG: CPT | Mod: CPTII,S$GLB,, | Performed by: RADIOLOGY

## 2022-07-01 PROCEDURE — 1159F PR MEDICATION LIST DOCUMENTED IN MEDICAL RECORD: ICD-10-PCS | Mod: CPTII,S$GLB,, | Performed by: RADIOLOGY

## 2022-07-01 PROCEDURE — 99213 OFFICE O/P EST LOW 20 MIN: CPT | Mod: S$GLB,,, | Performed by: RADIOLOGY

## 2022-07-01 PROCEDURE — 99499 RISK ADDL DX/OHS AUDIT: ICD-10-PCS | Mod: S$GLB,,, | Performed by: RADIOLOGY

## 2022-07-01 PROCEDURE — 3288F PR FALLS RISK ASSESSMENT DOCUMENTED: ICD-10-PCS | Mod: CPTII,S$GLB,, | Performed by: RADIOLOGY

## 2022-07-01 PROCEDURE — 71250 CT THORAX DX C-: CPT | Mod: 26,,, | Performed by: RADIOLOGY

## 2022-07-01 PROCEDURE — 3078F PR MOST RECENT DIASTOLIC BLOOD PRESSURE < 80 MM HG: ICD-10-PCS | Mod: CPTII,S$GLB,, | Performed by: RADIOLOGY

## 2022-07-01 PROCEDURE — 4010F PR ACE/ARB THEARPY RXD/TAKEN: ICD-10-PCS | Mod: CPTII,S$GLB,, | Performed by: RADIOLOGY

## 2022-07-01 PROCEDURE — 99999 PR PBB SHADOW E&M-EST. PATIENT-LVL V: ICD-10-PCS | Mod: PBBFAC,,, | Performed by: RADIOLOGY

## 2022-07-01 PROCEDURE — 1126F AMNT PAIN NOTED NONE PRSNT: CPT | Mod: CPTII,S$GLB,, | Performed by: RADIOLOGY

## 2022-07-01 PROCEDURE — 3288F FALL RISK ASSESSMENT DOCD: CPT | Mod: CPTII,S$GLB,, | Performed by: RADIOLOGY

## 2022-07-01 PROCEDURE — 99999 PR PBB SHADOW E&M-EST. PATIENT-LVL V: CPT | Mod: PBBFAC,,, | Performed by: RADIOLOGY

## 2022-07-01 PROCEDURE — 99499 UNLISTED E&M SERVICE: CPT | Mod: S$GLB,,, | Performed by: RADIOLOGY

## 2022-07-01 PROCEDURE — 71250 CT THORAX DX C-: CPT | Mod: TC

## 2022-07-01 PROCEDURE — 3074F PR MOST RECENT SYSTOLIC BLOOD PRESSURE < 130 MM HG: ICD-10-PCS | Mod: CPTII,S$GLB,, | Performed by: RADIOLOGY

## 2022-07-01 NOTE — PROGRESS NOTES
07/01/2022    Radiation Oncology Follow-Up Visit    Prior Radiation History:    Site  Technique  Energy  Dose/Fx (Gy)  #Fx  Total Dose (Gy)  Start Date  End Date  Elapsed Days    RUL Lung - 2 PTV  SBRT  6X  11  5 / 5  55  10/29/2020  11/4/2020  6        Assessment   This is a 71 y.o. y/o female with likely synchronous primary Stage IA2 (cT1b cN0 M0) RUL NSCLC (adeno; no actionable mutations) diagnosed on biopsy of the spiculated nodule 9/14/20. In addition there was a slightly more superior, pleural-based lesion that increased in size and solidity. She was not a candidate for resection due to COPD and CHF. She completed SBRT 55 Gy in 5 fractions to both RUL nodules on 11/4/20.     No late toxicity from treatment. CT Chest today 7/1/22 demonstrates interval enlargement of JASSI nodule to 1.0 cm; the other two nodules in JASSI are <0.4 cm and stable.    I am concerned that this is progression. I recommend PET/CT to re-stage. If lesion is avid and isolated, could consider biopsy vs empiric treatment with SBRT.        Plan   1) Schedule PET/CT for re-staging.         Chief Complaint   Patient presents with    Lung Cancer     Follow up        HPI: No major hospitalizations or health issues since last treatment. Reports breathing is stable; remains on supplemental O2. Denies chest wall pain or fevers.       Past Medical History:   Diagnosis Date    Anxiety     Cervical spinal stenosis     Choledocholithiasis     Chronic obstructive pulmonary disease 03/16/2016    Degenerative disc disease of cervical spine     Diverticulosis 04/24/2018    History of alcohol abuse 03/02/2021    History of gastric ulcer     History of L3 compression fracture 12/19/2018    History of lung cancer     s/p completion of XRT in 10/2020    Hyperlipidemia     Iron deficiency anemia     Osteoarthritis     Stage III CKD 2017       Past Surgical History:   Procedure Laterality Date    BREAST CYST EXCISION Right     1967    CATARACT  EXTRACTION      COLONOSCOPY  2015    COLONOSCOPY N/A 6/8/2022    Procedure: COLONOSCOPY;  Surgeon: Helio Cheema MD;  Location: [facility] (2ND FLR);  Service: Endoscopy;  Laterality: N/A;  5/25-Pt requesting Dr. Cheema-approval given per Dr. Cheema-ml  Fully vaccinated, prep instr portal -ml    CORONARY ANGIOGRAPHY N/A 7/20/2018    Procedure: ANGIOGRAM, CORONARY ARTERY;  Surgeon: Willard Roberts MD;  Location: [facility] CATH LAB;  Service: Cardiovascular;  Laterality: N/A;    ENDOSCOPIC ULTRASOUND OF UPPER GASTROINTESTINAL TRACT N/A 3/24/2021    Procedure: ULTRASOUND, UPPER GI TRACT, ENDOSCOPIC;  Surgeon: Helio Cheema MD;  Location: [facility] ENDO (2ND FLR);  Service: Endoscopy;  Laterality: N/A;    ENDOSCOPIC ULTRASOUND OF UPPER GASTROINTESTINAL TRACT N/A 4/25/2022    Procedure: ULTRASOUND, UPPER GI TRACT, ENDOSCOPIC;  Surgeon: Helio Cheema MD;  Location: [facility] (2ND FLR);  Service: Endoscopy;  Laterality: N/A;  cardiac risk assessment 1 per Dr. Ortez. see t/e 3/17-SC  3/25:new instructions via portal. home with medical transport?-SC    ERCP N/A 3/24/2021    Procedure: ERCP (ENDOSCOPIC RETROGRADE CHOLANGIOPANCREATOGRAPHY);  Surgeon: Helio Cheema MD;  Location: [facility] (2ND FLR);  Service: Endoscopy;  Laterality: N/A;    ERCP N/A 4/30/2021    Procedure: ERCP (ENDOSCOPIC RETROGRADE CHOLANGIOPANCREATOGRAPHY);  Surgeon: Boo Gray MD;  Location: [facility] (2ND FLR);  Service: Endoscopy;  Laterality: N/A;    ERCP N/A 7/1/2021    Procedure: ERCP (ENDOSCOPIC RETROGRADE CHOLANGIOPANCREATOGRAPHY);  Surgeon: Helio Cheema MD;  Location: [facility] ENDO (2ND FLR);  Service: Endoscopy;  Laterality: N/A;  Ok for Taxi. Dr Cheema  rapid covid 1130am- tb inst email    ESOPHAGOGASTRODUODENOSCOPY  2015    ESOPHAGOGASTRODUODENOSCOPY N/A 3/4/2021    Procedure: EGD (ESOPHAGOGASTRODUODENOSCOPY);  Surgeon: Yenifer Ramirez MD;  Location: [facility];  Service: Endoscopy;  Laterality: N/A;     ESOPHAGOGASTRODUODENOSCOPY N/A 3/24/2021    Procedure: EGD (ESOPHAGOGASTRODUODENOSCOPY);  Surgeon: Helio Cheema MD;  Location: [facility] ([address] [place] [address]);  Service: Endoscopy;  Laterality: N/A;    EYE SURGERY  2016    Cataracts    FRACTURE SURGERY  2014    JOINT REPLACEMENT  2007    ORIF FEMUR FRACTURE Left     TOTAL KNEE ARTHROPLASTY Left 2007    TUBAL LIGATION         Social History     Tobacco Use    Smoking status: Former Smoker     Packs/day: 1.50     Years: 50.00     Pack years: 75.00     Types: Cigarettes    Smokeless tobacco: Never Used   Substance Use Topics    Alcohol use: Yes     Alcohol/week: 7.0 standard drinks     Types: 7 Shots of liquor per week     Comment: at least 1 cocktail a day    Drug use: No       Cancer-related family history includes Cancer in her sister; Rectal cancer in her father. There is no history of Melanoma or Breast cancer.    Current Outpatient Medications on File Prior to Visit   Medication Sig Dispense Refill    atorvastatin (LIPITOR) 40 MG tablet TAKE 1 TABLET BY MOUTH EVERY DAILY 90 tablet 3    azelastine (ASTELIN) 137 mcg (0.1 %) nasal spray 1 spray (137 mcg total) by Nasal route 2 (two) times daily. 30 mL 3    b complex vitamins capsule Take 1 capsule by mouth once daily.      calcium carbonate (OS-SANDRINE) 500 mg calcium (1,250 mg) tablet Take 2 tablets (1,000 mg total) by mouth 2 (two) times daily. 30 tablet 0    cyanocobalamin, vitamin B-12, 1,000 mcg TbSR Take 1,000 mcg by mouth once daily.      denosumab (PROLIA) 60 mg/mL Syrg Inject 1 mL (60 mg total) into the skin every 6 (six) months. 2 mL 0    DULoxetine (CYMBALTA) 30 MG capsule Take 1 capsule (30 mg total) by mouth once daily. 30 capsule 2    fluticasone (FLONASE) 50 mcg/actuation nasal spray 1 spray by Each Nare route 2 (two) times daily as needed for Rhinitis.      fluticasone propion-salmeterol 115-21 mcg/dose (ADVAIR HFA) 115-21 mcg/actuation HFAA inhaler Inhale 2 puffs into the lungs every 12  (twelve) hours. 36 g 3    gabapentin (NEURONTIN) 300 MG capsule Take 1 capsule (300 mg total) by mouth 3 (three) times daily. 90 capsule 11    guaiFENesin (MUCINEX) 600 mg 12 hr tablet Take 1,200 mg by mouth 2 (two) times daily as needed for Congestion.      HYDROcodone-acetaminophen (NORCO)  mg per tablet Take 1 tablet by mouth every 12 (twelve) hours as needed for Pain. 60 tablet 0    losartan (COZAAR) 50 MG tablet Take 1 tablet (50 mg total) by mouth once daily. 90 tablet 3    magnesium oxide (MAG-OX) 400 mg (241.3 mg magnesium) tablet Take 1 tablet by mouth every 12 (twelve) hours. 30 tablet 0    metoprolol succinate (TOPROL-XL) 25 MG 24 hr tablet Take 2 tablets (50 mg total) by mouth once daily. 60 tablet 11    multivit-min/iron/folic/lutein (CENTRUM SILVER WOMEN ORAL) Take 1 tablet by mouth once daily.      pantoprazole (PROTONIX) 40 MG tablet Take 1 tablet (40 mg total) by mouth once daily. 90 tablet 0    torsemide (DEMADEX) 20 MG Tab Take 1 tablet (20 mg total) by mouth once daily. (Patient taking differently: Take 20 mg by mouth once daily. Pt has been taking 2 BID daily for last few days) 30 tablet 11    traZODone (DESYREL) 50 MG tablet Take 1 tablet (50 mg total) by mouth nightly as needed for Insomnia. 30 tablet 2    umeclidinium (INCRUSE ELLIPTA) 62.5 mcg/actuation inhalation capsule Inhale 1 puff into the lungs once daily. Controller 90 each 3    vitamin D (VITAMIN D3) 1000 units Tab Take 1 tablet (1,000 Units total) by mouth once daily. 30 tablet 2    ZINC ORAL Take by mouth.      [DISCONTINUED] albuterol (VENTOLIN HFA) 90 mcg/actuation inhaler inhale 1-2 puffs as needed every 6 hours for wheezing and shortness of breath (Patient taking differently: inhale 1-2 puffs as needed every 6 hours for wheezing and shortness of breath) 25.5 g 11     No current facility-administered medications on file prior to visit.       Review of patient's allergies indicates:   Allergen Reactions     "Wellbutrin [bupropion hcl] Other (See Comments)     Hyponatremia and hypomagnesia     Zoloft [sertraline] Other (See Comments)     Hyponatremia and hypomagnesia     Bananas [banana]      Emesis, and stomach cramps       Review of Systems   Constitutional: Positive for malaise/fatigue.   Respiratory: Positive for cough and shortness of breath.    Gastrointestinal: Positive for heartburn.   Musculoskeletal: Positive for back pain, joint pain and neck pain.        Vital Signs: BP (!) 125/59 (BP Location: Right arm, Patient Position: Sitting, BP Method: Medium (Automatic))   Pulse 70   Temp 98.6 °F (37 °C)   Resp 18   Ht 5' 6" (1.676 m)   Wt 97.1 kg (214 lb)   SpO2 96%   BMI 34.54 kg/m²     ECOG Performance Status: 2 - Ambulates, capable of self care only    Physical Exam  Vitals reviewed.   Constitutional:       Appearance: Normal appearance.   HENT:      Head: Normocephalic and atraumatic.   Eyes:      General: No scleral icterus.     Extraocular Movements: Extraocular movements intact.   Pulmonary:      Effort: No accessory muscle usage or respiratory distress.      Comments: +O2 via NC  Abdominal:      General: There is no distension.   Musculoskeletal:         General: Normal range of motion.      Cervical back: Normal range of motion and neck supple.   Lymphadenopathy:      Cervical: No cervical adenopathy.   Skin:     General: Skin is warm and dry.   Neurological:      Mental Status: She is alert and oriented to person, place, and time.      Cranial Nerves: No cranial nerve deficit.      Comments: In wheelchair   Psychiatric:         Mood and Affect: Mood and affect normal.         Judgment: Judgment normal.          Labs:    Imaging: I have personally reviewed the patient's available images and reports and summarized pertinent findings above in HPI.     Pathology: No new path          "

## 2022-07-07 ENCOUNTER — PATIENT MESSAGE (OUTPATIENT)
Dept: INTERNAL MEDICINE | Facility: CLINIC | Age: 72
End: 2022-07-07
Payer: MEDICARE

## 2022-07-07 DIAGNOSIS — K57.90 DIVERTICULOSIS: ICD-10-CM

## 2022-07-08 ENCOUNTER — IMMUNIZATION (OUTPATIENT)
Dept: PHARMACY | Facility: CLINIC | Age: 72
End: 2022-07-08
Payer: MEDICARE

## 2022-07-08 DIAGNOSIS — Z23 NEED FOR VACCINATION: Primary | ICD-10-CM

## 2022-07-08 RX ORDER — PANTOPRAZOLE SODIUM 40 MG/1
40 TABLET, DELAYED RELEASE ORAL DAILY
Qty: 90 TABLET | Refills: 0 | Status: SHIPPED | OUTPATIENT
Start: 2022-07-08 | End: 2022-10-09 | Stop reason: SDUPTHER

## 2022-07-08 NOTE — TELEPHONE ENCOUNTER
No new care gaps identified.  [facility] Embedded Care Gaps. Reference number: [id number]. 7/08/2022   8:07:24 AM CDT

## 2022-07-14 PROBLEM — I70.0 AORTIC ATHEROSCLEROSIS: Status: ACTIVE | Noted: 2022-07-14

## 2022-07-14 NOTE — PROGRESS NOTES
Chart has been dictated using voice recognition software.  It is not been reviewed carefully for any transcriptional errors due to this technology.   Subjective:   Patient ID:  Nalini Varma is a 71 y.o. female who presents for follow-up of No chief complaint on file.      HPI:  Patient with  nonischemic heart failure with normal ejection fraction, SIADH, Chronic obstructive pulmonary disease, Hypertension, Tobacco dependence, Hyperlipidemia,andlung cancer in right upper lobe and s/p radiationtherapy.  the patient was recently evaluated for GI bleeding found to have a colonic polyp as well as diverticuli.  The patient is in the digital hypertension program with poor control of her blood pressure. However, her comment to the digital hypertension care team is that she continues to forget to take her medication and rushes before taking her blood pressure.  Her last recorded blood pressures in the program ended at the end of June and are as below:   Recent Readings 6/26/2022 6/26/2022 6/26/2022 6/26/2022 6/12/2022   SBP (mmHg) 139 142 170 176 187   DBP (mmHg) 66 73 86 97 95   Pulse 68 69 66 67) 81     Recent Readings 6/12/2022 6/12/2022 5/30/2022 5/20/2022 5/18/2022   SBP (mmHg) 205 196 135 146 148   DBP (mmHg) 97 91 74 78 79   Pulse 84 84 84 81 95     Patient is stable without any change in dyspnea.  Patient denies any chest discomfort on exertion or at rest. Patient denies any palpitations, lightheadedness, or syncope. Patient denies any chest discomfort on exertion or at rest.        Past Medical History:   Diagnosis Date    Anxiety     Cervical spinal stenosis     Choledocholithiasis     Chronic obstructive pulmonary disease 03/16/2016    Degenerative disc disease of cervical spine     Diverticulosis 04/24/2018    History of alcohol abuse 03/02/2021    History of gastric ulcer     History of L3 compression fracture 12/19/2018    History of lung cancer     s/p completion of XRT in 10/2020     Hyperlipidemia     Iron deficiency anemia     Osteoarthritis     Stage III CKD 2017       Outpatient Medications Prior to Visit   Medication Sig Dispense Refill    atorvastatin (LIPITOR) 40 MG tablet TAKE 1 TABLET BY MOUTH EVERY DAILY 90 tablet 3    azelastine (ASTELIN) 137 mcg (0.1 %) nasal spray 1 spray (137 mcg total) by Nasal route 2 (two) times daily. 30 mL 3    b complex vitamins capsule Take 1 capsule by mouth once daily.      calcium carbonate (OS-SANDRINE) 500 mg calcium (1,250 mg) tablet Take 2 tablets (1,000 mg total) by mouth 2 (two) times daily. 30 tablet 0    cyanocobalamin, vitamin B-12, 1,000 mcg TbSR Take 1,000 mcg by mouth once daily.      denosumab (PROLIA) 60 mg/mL Syrg Inject 1 mL (60 mg total) into the skin every 6 (six) months. 2 mL 0    DULoxetine (CYMBALTA) 30 MG capsule Take 1 capsule (30 mg total) by mouth once daily. 30 capsule 2    fluticasone (FLONASE) 50 mcg/actuation nasal spray 1 spray by Each Nare route 2 (two) times daily as needed for Rhinitis.      fluticasone propion-salmeterol 115-21 mcg/dose (ADVAIR HFA) 115-21 mcg/actuation HFAA inhaler Inhale 2 puffs into the lungs every 12 (twelve) hours. 36 g 3    gabapentin (NEURONTIN) 300 MG capsule Take 1 capsule (300 mg total) by mouth 3 (three) times daily. 90 capsule 11    guaiFENesin (MUCINEX) 600 mg 12 hr tablet Take 1,200 mg by mouth 2 (two) times daily as needed for Congestion.      HYDROcodone-acetaminophen (NORCO)  mg per tablet Take 1 tablet by mouth every 12 (twelve) hours as needed for Pain. 60 tablet 0    losartan (COZAAR) 50 MG tablet Take 1 tablet (50 mg total) by mouth once daily. 90 tablet 3    magnesium oxide (MAG-OX) 400 mg (241.3 mg magnesium) tablet Take 1 tablet by mouth every 12 (twelve) hours. 30 tablet 0    metoprolol succinate (TOPROL-XL) 25 MG 24 hr tablet Take 2 tablets (50 mg total) by mouth once daily. 60 tablet 11    multivit-min/iron/folic/lutein (CENTRUM SILVER WOMEN ORAL) Take 1  "tablet by mouth once daily.      pantoprazole (PROTONIX) 40 MG tablet Take 1 tablet (40 mg total) by mouth once daily. 90 tablet 0    sars-cov-2, covid-19, (MODERNA COVID-19) 50 mcg/0.25 ml injection (BOOSTER) Inject into the muscle. 0.25 mL 0    torsemide (DEMADEX) 20 MG Tab Take 1 tablet (20 mg total) by mouth once daily. (Patient taking differently: Take 20 mg by mouth once daily. Pt has been taking 2 BID daily for last few days) 30 tablet 11    traZODone (DESYREL) 50 MG tablet Take 1 tablet (50 mg total) by mouth nightly as needed for Insomnia. 30 tablet 2    umeclidinium (INCRUSE ELLIPTA) 62.5 mcg/actuation inhalation capsule Inhale 1 puff into the lungs once daily. Controller 90 each 3    vitamin D (VITAMIN D3) 1000 units Tab Take 1 tablet (1,000 Units total) by mouth once daily. 30 tablet 2    ZINC ORAL Take by mouth.       No facility-administered medications prior to visit.       ROS - No change from prior visit in neurologic, respiratory, endocrine, GI, hematologic, or constitutional complaints   Objective:   Physical Exam  Constitutional:       Appearance: She is well-developed. She is obese.      Comments: Examined in wheelchair    BP (!) 144/63 (BP Location: Left arm, Patient Position: Sitting, BP Method: Medium (Automatic))   Pulse 81   Ht 5' 6" (1.676 m)   Wt 97.1 kg (214 lb 1.1 oz)   BMI 34.55 kg/m²      Neck:      Vascular: Normal carotid pulses. No carotid bruit or JVD.   Cardiovascular:      Rate and Rhythm: Normal rate and regular rhythm.      Pulses: Intact distal pulses.      Heart sounds: Murmur heard.    Harsh midsystolic murmur is present with a grade of 3/6 at the upper right sternal border radiating to the neck and apex. Late peaking  A2 diminished     No friction rub. No gallop.   Pulmonary:      Effort: Pulmonary effort is normal.      Breath sounds: Normal breath sounds. Decreased air movement present. No wheezing or rales.   Abdominal:      General: Bowel sounds are normal. "      Palpations: Abdomen is soft.      Tenderness: There is no abdominal tenderness.   Musculoskeletal:      Cervical back: Neck supple.      Right lower leg: Edema (1+ ankle extending into thigh) present.      Left lower leg: Edema (trace-1+ pretibial) present.   Skin:     Nails: There is no clubbing.   Neurological:      Mental Status: She is alert and oriented to person, place, and time.           Lab Results   Component Value Date     04/14/2022    K 4.7 04/14/2022    BUN 33 (H) 04/14/2022    CREATININE 1.3 04/14/2022     (H) 04/14/2022    HGBA1C 5.0 02/15/2022    CHOL 123 02/15/2022    HDL 48 02/15/2022    LDLCALC 57.6 (L) 02/15/2022    TRIG 87 02/15/2022    CHOLHDL 39.0 02/15/2022    HGB 9.6 (L) 03/18/2022    HCT 29.8 (L) 03/18/2022     03/18/2022    INR 1.1 04/30/2021       Assessment:     1. Chronic combined systolic and diastolic CHF (congestive heart failure)    2. Chronic obstructive pulmonary disease, unspecified COPD type    3. Essential hypertension    4. Tobacco dependence    5. Hyperlipidemia, unspecified hyperlipidemia type    6. History of lung cancer    7. Aortic atherosclerosis    8. Class 1 obesity due to excess calories with serious comorbidity and body mass index (BMI) of 34.0 to 34.9 in adult      The patient is relatively stable with chronic dyspnea from her obstructive pulmonary disease.  She continues to smoke 4-5 cigarettes per day, although sometimes less.  Her examination suggests that she has severe aortic stenosis.  However, symptomatically she has no worse than when she was seen at her last (approach will) visit.  She is now in a new living quarters which has less dust so her breathing is improved.  As the patient is not symptomatic we worse, no changes are anticipated and therefore she does not need repeat echocardiogram at this time.  Her blood pressure is somewhat elevated recording to the digital hypertension program as well as on her visit here in clinic,  therefore, her losartan will be increased to 100 mg q.d. Her blood pressure will be rechecked in 1 month to see the effect of the change in dose. Unless there are intervening problems, patient should return for re-evaluation in 6 months.     Plan:     Diagnoses and all orders for this visit:    Chronic combined systolic and diastolic CHF (congestive heart failure)    Chronic obstructive pulmonary disease, unspecified COPD type    Essential hypertension    Tobacco dependence    Hyperlipidemia, unspecified hyperlipidemia type    History of lung cancer    Aortic atherosclerosis    Class 1 obesity due to excess calories with serious comorbidity and body mass index (BMI) of 34.0 to 34.9 in adult          Parvez Ortez MD  Consultative Cardiology

## 2022-07-15 ENCOUNTER — OFFICE VISIT (OUTPATIENT)
Dept: CARDIOLOGY | Facility: CLINIC | Age: 72
End: 2022-07-15
Payer: MEDICARE

## 2022-07-15 VITALS
DIASTOLIC BLOOD PRESSURE: 63 MMHG | HEIGHT: 66 IN | SYSTOLIC BLOOD PRESSURE: 144 MMHG | WEIGHT: 214.06 LBS | BODY MASS INDEX: 34.4 KG/M2 | HEART RATE: 81 BPM

## 2022-07-15 DIAGNOSIS — E78.5 HYPERLIPIDEMIA, UNSPECIFIED HYPERLIPIDEMIA TYPE: Chronic | ICD-10-CM

## 2022-07-15 DIAGNOSIS — Z85.118 HISTORY OF LUNG CANCER: ICD-10-CM

## 2022-07-15 DIAGNOSIS — J44.9 CHRONIC OBSTRUCTIVE PULMONARY DISEASE, UNSPECIFIED COPD TYPE: ICD-10-CM

## 2022-07-15 DIAGNOSIS — E66.09 CLASS 1 OBESITY DUE TO EXCESS CALORIES WITH SERIOUS COMORBIDITY AND BODY MASS INDEX (BMI) OF 34.0 TO 34.9 IN ADULT: ICD-10-CM

## 2022-07-15 DIAGNOSIS — I10 ESSENTIAL HYPERTENSION: Chronic | ICD-10-CM

## 2022-07-15 DIAGNOSIS — I50.42 CHRONIC COMBINED SYSTOLIC AND DIASTOLIC CHF (CONGESTIVE HEART FAILURE): Primary | Chronic | ICD-10-CM

## 2022-07-15 DIAGNOSIS — G89.4 CHRONIC PAIN SYNDROME: ICD-10-CM

## 2022-07-15 DIAGNOSIS — I10 HYPERTENSION, BENIGN: ICD-10-CM

## 2022-07-15 DIAGNOSIS — M17.0 PRIMARY OSTEOARTHRITIS OF BOTH KNEES: ICD-10-CM

## 2022-07-15 DIAGNOSIS — F17.200 TOBACCO DEPENDENCE: ICD-10-CM

## 2022-07-15 DIAGNOSIS — I70.0 AORTIC ATHEROSCLEROSIS: ICD-10-CM

## 2022-07-15 PROCEDURE — 3008F PR BODY MASS INDEX (BMI) DOCUMENTED: ICD-10-PCS | Mod: CPTII,S$GLB,, | Performed by: INTERNAL MEDICINE

## 2022-07-15 PROCEDURE — 4010F ACE/ARB THERAPY RXD/TAKEN: CPT | Mod: CPTII,S$GLB,, | Performed by: INTERNAL MEDICINE

## 2022-07-15 PROCEDURE — 99999 PR PBB SHADOW E&M-EST. PATIENT-LVL V: CPT | Mod: PBBFAC,,, | Performed by: INTERNAL MEDICINE

## 2022-07-15 PROCEDURE — 1159F PR MEDICATION LIST DOCUMENTED IN MEDICAL RECORD: ICD-10-PCS | Mod: CPTII,S$GLB,, | Performed by: INTERNAL MEDICINE

## 2022-07-15 PROCEDURE — 99999 PR PBB SHADOW E&M-EST. PATIENT-LVL V: ICD-10-PCS | Mod: PBBFAC,,, | Performed by: INTERNAL MEDICINE

## 2022-07-15 PROCEDURE — 3078F PR MOST RECENT DIASTOLIC BLOOD PRESSURE < 80 MM HG: ICD-10-PCS | Mod: CPTII,S$GLB,, | Performed by: INTERNAL MEDICINE

## 2022-07-15 PROCEDURE — 99214 OFFICE O/P EST MOD 30 MIN: CPT | Mod: S$GLB,,, | Performed by: INTERNAL MEDICINE

## 2022-07-15 PROCEDURE — 3288F PR FALLS RISK ASSESSMENT DOCUMENTED: ICD-10-PCS | Mod: CPTII,S$GLB,, | Performed by: INTERNAL MEDICINE

## 2022-07-15 PROCEDURE — 3008F BODY MASS INDEX DOCD: CPT | Mod: CPTII,S$GLB,, | Performed by: INTERNAL MEDICINE

## 2022-07-15 PROCEDURE — 3078F DIAST BP <80 MM HG: CPT | Mod: CPTII,S$GLB,, | Performed by: INTERNAL MEDICINE

## 2022-07-15 PROCEDURE — 4010F PR ACE/ARB THEARPY RXD/TAKEN: ICD-10-PCS | Mod: CPTII,S$GLB,, | Performed by: INTERNAL MEDICINE

## 2022-07-15 PROCEDURE — 1100F PTFALLS ASSESS-DOCD GE2>/YR: CPT | Mod: CPTII,S$GLB,, | Performed by: INTERNAL MEDICINE

## 2022-07-15 PROCEDURE — 3077F PR MOST RECENT SYSTOLIC BLOOD PRESSURE >= 140 MM HG: ICD-10-PCS | Mod: CPTII,S$GLB,, | Performed by: INTERNAL MEDICINE

## 2022-07-15 PROCEDURE — 1160F RVW MEDS BY RX/DR IN RCRD: CPT | Mod: CPTII,S$GLB,, | Performed by: INTERNAL MEDICINE

## 2022-07-15 PROCEDURE — 1125F PR PAIN SEVERITY QUANTIFIED, PAIN PRESENT: ICD-10-PCS | Mod: CPTII,S$GLB,, | Performed by: INTERNAL MEDICINE

## 2022-07-15 PROCEDURE — 99214 PR OFFICE/OUTPT VISIT, EST, LEVL IV, 30-39 MIN: ICD-10-PCS | Mod: S$GLB,,, | Performed by: INTERNAL MEDICINE

## 2022-07-15 PROCEDURE — 1160F PR REVIEW ALL MEDS BY PRESCRIBER/CLIN PHARMACIST DOCUMENTED: ICD-10-PCS | Mod: CPTII,S$GLB,, | Performed by: INTERNAL MEDICINE

## 2022-07-15 PROCEDURE — 1125F AMNT PAIN NOTED PAIN PRSNT: CPT | Mod: CPTII,S$GLB,, | Performed by: INTERNAL MEDICINE

## 2022-07-15 PROCEDURE — 3288F FALL RISK ASSESSMENT DOCD: CPT | Mod: CPTII,S$GLB,, | Performed by: INTERNAL MEDICINE

## 2022-07-15 PROCEDURE — 3044F PR MOST RECENT HEMOGLOBIN A1C LEVEL <7.0%: ICD-10-PCS | Mod: CPTII,S$GLB,, | Performed by: INTERNAL MEDICINE

## 2022-07-15 PROCEDURE — 99499 UNLISTED E&M SERVICE: CPT | Mod: S$GLB,,, | Performed by: INTERNAL MEDICINE

## 2022-07-15 PROCEDURE — 99499 RISK ADDL DX/OHS AUDIT: ICD-10-PCS | Mod: S$GLB,,, | Performed by: INTERNAL MEDICINE

## 2022-07-15 PROCEDURE — 3077F SYST BP >= 140 MM HG: CPT | Mod: CPTII,S$GLB,, | Performed by: INTERNAL MEDICINE

## 2022-07-15 PROCEDURE — 1100F PR PT FALLS ASSESS DOC 2+ FALLS/FALL W/INJURY/YR: ICD-10-PCS | Mod: CPTII,S$GLB,, | Performed by: INTERNAL MEDICINE

## 2022-07-15 PROCEDURE — 3044F HG A1C LEVEL LT 7.0%: CPT | Mod: CPTII,S$GLB,, | Performed by: INTERNAL MEDICINE

## 2022-07-15 PROCEDURE — 1159F MED LIST DOCD IN RCRD: CPT | Mod: CPTII,S$GLB,, | Performed by: INTERNAL MEDICINE

## 2022-07-15 RX ORDER — LOSARTAN POTASSIUM 50 MG/1
100 TABLET ORAL DAILY
Qty: 180 TABLET | Refills: 3 | Status: SHIPPED | OUTPATIENT
Start: 2022-07-15 | End: 2022-09-21

## 2022-07-15 RX ORDER — HYDROCODONE BITARTRATE AND ACETAMINOPHEN 10; 325 MG/1; MG/1
1 TABLET ORAL EVERY 12 HOURS PRN
Qty: 60 TABLET | Refills: 0 | Status: SHIPPED | OUTPATIENT
Start: 2022-07-15 | End: 2022-08-16 | Stop reason: SDUPTHER

## 2022-07-15 NOTE — Clinical Note
Thank you for allowing me to follow-up with Nalini Varma for heart failure. Please see my note for details of this encounter. If you have any questions, please contact me.  Thank you again for allowing to participate in the care of this patient.

## 2022-07-15 NOTE — TELEPHONE ENCOUNTER
No new care gaps identified.  [facility] Embedded Care Gaps. Reference number: [id number]. 7/15/2022   2:10:55 PM CDT

## 2022-07-15 NOTE — PATIENT INSTRUCTIONS
Let Dr Ortez know when your blood test is performed and remind him in 3 weeks to check your blood pressure results.

## 2022-07-16 DIAGNOSIS — I10 HYPERTENSION, BENIGN: ICD-10-CM

## 2022-07-16 RX ORDER — METOPROLOL SUCCINATE 25 MG/1
50 TABLET, EXTENDED RELEASE ORAL DAILY
Qty: 60 TABLET | Refills: 11 | Status: CANCELLED | OUTPATIENT
Start: 2022-07-16 | End: 2023-07-16

## 2022-07-22 ENCOUNTER — HOSPITAL ENCOUNTER (OUTPATIENT)
Dept: RADIOLOGY | Facility: HOSPITAL | Age: 72
Discharge: HOME | End: 2022-07-22
Attending: RADIOLOGY
Payer: MEDICARE

## 2022-07-22 DIAGNOSIS — C34.11 MALIGNANT NEOPLASM OF RIGHT UPPER LOBE OF LUNG: ICD-10-CM

## 2022-07-22 DIAGNOSIS — C34.12 PRIMARY MALIGNANT NEOPLASM OF LEFT UPPER LOBE OF LUNG: ICD-10-CM

## 2022-07-22 LAB — POCT GLUCOSE: 104 MG/DL (ref 70–110)

## 2022-07-22 PROCEDURE — 78815 NM PET CT ROUTINE: ICD-10-PCS | Mod: 26,PS,, | Performed by: STUDENT IN AN ORGANIZED HEALTH CARE EDUCATION/TRAINING PROGRAM

## 2022-07-22 PROCEDURE — 78815 PET IMAGE W/CT SKULL-THIGH: CPT | Mod: TC

## 2022-07-22 PROCEDURE — 78815 PET IMAGE W/CT SKULL-THIGH: CPT | Mod: 26,PS,, | Performed by: STUDENT IN AN ORGANIZED HEALTH CARE EDUCATION/TRAINING PROGRAM

## 2022-07-26 ENCOUNTER — LAB VISIT (OUTPATIENT)
Dept: LAB | Facility: OTHER | Age: 72
End: 2022-07-26
Attending: INTERNAL MEDICINE
Payer: MEDICARE

## 2022-07-26 DIAGNOSIS — K86.9 PANCREATIC LESION: ICD-10-CM

## 2022-07-26 DIAGNOSIS — D50.0 IRON DEFICIENCY ANEMIA DUE TO CHRONIC BLOOD LOSS: ICD-10-CM

## 2022-07-26 LAB
ALBUMIN SERPL BCP-MCNC: 3.1 G/DL (ref 3.5–5.2)
ALP SERPL-CCNC: 82 U/L (ref 55–135)
ALT SERPL W/O P-5'-P-CCNC: 13 U/L (ref 10–44)
ANION GAP SERPL CALC-SCNC: 12 MMOL/L (ref 8–16)
AST SERPL-CCNC: 12 U/L (ref 10–40)
BASOPHILS # BLD AUTO: 0.02 K/UL (ref 0–0.2)
BASOPHILS NFR BLD: 0.3 % (ref 0–1.9)
BILIRUB SERPL-MCNC: 0.3 MG/DL (ref 0.1–1)
BUN SERPL-MCNC: 28 MG/DL (ref 8–23)
CALCIUM SERPL-MCNC: 8.7 MG/DL (ref 8.7–10.5)
CANCER AG19-9 SERPL-ACNC: 3.2 U/ML (ref 0–40)
CHLORIDE SERPL-SCNC: 98 MMOL/L (ref 95–110)
CO2 SERPL-SCNC: 24 MMOL/L (ref 23–29)
CREAT SERPL-MCNC: 1.2 MG/DL (ref 0.5–1.4)
DIFFERENTIAL METHOD: ABNORMAL
EOSINOPHIL # BLD AUTO: 0.1 K/UL (ref 0–0.5)
EOSINOPHIL NFR BLD: 1.2 % (ref 0–8)
ERYTHROCYTE [DISTWIDTH] IN BLOOD BY AUTOMATED COUNT: 15.2 % (ref 11.5–14.5)
EST. GFR  (AFRICAN AMERICAN): 52 ML/MIN/1.73 M^2
EST. GFR  (NON AFRICAN AMERICAN): 45 ML/MIN/1.73 M^2
FERRITIN SERPL-MCNC: 145 NG/ML (ref 20–300)
GLUCOSE SERPL-MCNC: 117 MG/DL (ref 70–110)
HCT VFR BLD AUTO: 30.9 % (ref 37–48.5)
HGB BLD-MCNC: 9.8 G/DL (ref 12–16)
IMM GRANULOCYTES # BLD AUTO: 0.02 K/UL (ref 0–0.04)
IMM GRANULOCYTES NFR BLD AUTO: 0.3 % (ref 0–0.5)
IRON SERPL-MCNC: 38 UG/DL (ref 30–160)
LYMPHOCYTES # BLD AUTO: 1.4 K/UL (ref 1–4.8)
LYMPHOCYTES NFR BLD: 18.6 % (ref 18–48)
MCH RBC QN AUTO: 30.3 PG (ref 27–31)
MCHC RBC AUTO-ENTMCNC: 31.7 G/DL (ref 32–36)
MCV RBC AUTO: 96 FL (ref 82–98)
MONOCYTES # BLD AUTO: 0.9 K/UL (ref 0.3–1)
MONOCYTES NFR BLD: 11.3 % (ref 4–15)
NEUTROPHILS # BLD AUTO: 5.2 K/UL (ref 1.8–7.7)
NEUTROPHILS NFR BLD: 68.3 % (ref 38–73)
NRBC BLD-RTO: 0 /100 WBC
PLATELET # BLD AUTO: 224 K/UL (ref 150–450)
PMV BLD AUTO: 9.3 FL (ref 9.2–12.9)
POTASSIUM SERPL-SCNC: 4.3 MMOL/L (ref 3.5–5.1)
PROT SERPL-MCNC: 6.4 G/DL (ref 6–8.4)
RBC # BLD AUTO: 3.23 M/UL (ref 4–5.4)
RETICS/RBC NFR AUTO: 1.6 % (ref 0.5–2.5)
SATURATED IRON: 15 % (ref 20–50)
SODIUM SERPL-SCNC: 134 MMOL/L (ref 136–145)
TOTAL IRON BINDING CAPACITY: 246 UG/DL (ref 250–450)
TRANSFERRIN SERPL-MCNC: 166 MG/DL (ref 200–375)
WBC # BLD AUTO: 7.59 K/UL (ref 3.9–12.7)

## 2022-07-26 PROCEDURE — 80053 COMPREHEN METABOLIC PANEL: CPT | Performed by: STUDENT IN AN ORGANIZED HEALTH CARE EDUCATION/TRAINING PROGRAM

## 2022-07-26 PROCEDURE — 82728 ASSAY OF FERRITIN: CPT | Performed by: STUDENT IN AN ORGANIZED HEALTH CARE EDUCATION/TRAINING PROGRAM

## 2022-07-26 PROCEDURE — 84466 ASSAY OF TRANSFERRIN: CPT | Performed by: STUDENT IN AN ORGANIZED HEALTH CARE EDUCATION/TRAINING PROGRAM

## 2022-07-26 PROCEDURE — 85025 COMPLETE CBC W/AUTO DIFF WBC: CPT | Performed by: STUDENT IN AN ORGANIZED HEALTH CARE EDUCATION/TRAINING PROGRAM

## 2022-07-26 PROCEDURE — 85045 AUTOMATED RETICULOCYTE COUNT: CPT | Performed by: STUDENT IN AN ORGANIZED HEALTH CARE EDUCATION/TRAINING PROGRAM

## 2022-07-26 PROCEDURE — 86301 IMMUNOASSAY TUMOR CA 19-9: CPT | Mod: GA | Performed by: STUDENT IN AN ORGANIZED HEALTH CARE EDUCATION/TRAINING PROGRAM

## 2022-07-26 PROCEDURE — 36415 COLL VENOUS BLD VENIPUNCTURE: CPT | Performed by: STUDENT IN AN ORGANIZED HEALTH CARE EDUCATION/TRAINING PROGRAM

## 2022-07-28 ENCOUNTER — OFFICE VISIT (OUTPATIENT)
Dept: HEMATOLOGY/ONCOLOGY | Facility: CLINIC | Age: 72
End: 2022-07-28
Payer: MEDICARE

## 2022-07-28 DIAGNOSIS — Z85.118 HISTORY OF LUNG CANCER: ICD-10-CM

## 2022-07-28 DIAGNOSIS — D50.0 IRON DEFICIENCY ANEMIA DUE TO CHRONIC BLOOD LOSS: Primary | ICD-10-CM

## 2022-07-28 DIAGNOSIS — K86.9 PANCREATIC LESION: ICD-10-CM

## 2022-07-28 DIAGNOSIS — D53.9 NUTRITIONAL ANEMIA: ICD-10-CM

## 2022-07-28 PROCEDURE — 99214 PR OFFICE/OUTPT VISIT, EST, LEVL IV, 30-39 MIN: ICD-10-PCS | Mod: 95,,, | Performed by: STUDENT IN AN ORGANIZED HEALTH CARE EDUCATION/TRAINING PROGRAM

## 2022-07-28 PROCEDURE — 99499 RISK ADDL DX/OHS AUDIT: ICD-10-PCS | Mod: 95,,, | Performed by: STUDENT IN AN ORGANIZED HEALTH CARE EDUCATION/TRAINING PROGRAM

## 2022-07-28 PROCEDURE — 99499 UNLISTED E&M SERVICE: CPT | Mod: 95,,, | Performed by: STUDENT IN AN ORGANIZED HEALTH CARE EDUCATION/TRAINING PROGRAM

## 2022-07-28 PROCEDURE — 1159F PR MEDICATION LIST DOCUMENTED IN MEDICAL RECORD: ICD-10-PCS | Mod: CPTII,95,, | Performed by: STUDENT IN AN ORGANIZED HEALTH CARE EDUCATION/TRAINING PROGRAM

## 2022-07-28 PROCEDURE — 99214 OFFICE O/P EST MOD 30 MIN: CPT | Mod: 95,,, | Performed by: STUDENT IN AN ORGANIZED HEALTH CARE EDUCATION/TRAINING PROGRAM

## 2022-07-28 PROCEDURE — 3044F PR MOST RECENT HEMOGLOBIN A1C LEVEL <7.0%: ICD-10-PCS | Mod: CPTII,95,, | Performed by: STUDENT IN AN ORGANIZED HEALTH CARE EDUCATION/TRAINING PROGRAM

## 2022-07-28 PROCEDURE — 4010F PR ACE/ARB THEARPY RXD/TAKEN: ICD-10-PCS | Mod: CPTII,95,, | Performed by: STUDENT IN AN ORGANIZED HEALTH CARE EDUCATION/TRAINING PROGRAM

## 2022-07-28 PROCEDURE — 4010F ACE/ARB THERAPY RXD/TAKEN: CPT | Mod: CPTII,95,, | Performed by: STUDENT IN AN ORGANIZED HEALTH CARE EDUCATION/TRAINING PROGRAM

## 2022-07-28 PROCEDURE — 1159F MED LIST DOCD IN RCRD: CPT | Mod: CPTII,95,, | Performed by: STUDENT IN AN ORGANIZED HEALTH CARE EDUCATION/TRAINING PROGRAM

## 2022-07-28 PROCEDURE — 1160F RVW MEDS BY RX/DR IN RCRD: CPT | Mod: CPTII,95,, | Performed by: STUDENT IN AN ORGANIZED HEALTH CARE EDUCATION/TRAINING PROGRAM

## 2022-07-28 PROCEDURE — 3044F HG A1C LEVEL LT 7.0%: CPT | Mod: CPTII,95,, | Performed by: STUDENT IN AN ORGANIZED HEALTH CARE EDUCATION/TRAINING PROGRAM

## 2022-07-28 PROCEDURE — 1160F PR REVIEW ALL MEDS BY PRESCRIBER/CLIN PHARMACIST DOCUMENTED: ICD-10-PCS | Mod: CPTII,95,, | Performed by: STUDENT IN AN ORGANIZED HEALTH CARE EDUCATION/TRAINING PROGRAM

## 2022-07-28 RX ORDER — SODIUM CHLORIDE 0.9 % (FLUSH) 0.9 %
10 SYRINGE (ML) INJECTION
Status: CANCELLED | OUTPATIENT
Start: 2022-07-28

## 2022-07-28 RX ORDER — HEPARIN 100 UNIT/ML
500 SYRINGE INTRAVENOUS
Status: CANCELLED | OUTPATIENT
Start: 2022-07-28

## 2022-07-28 NOTE — PROGRESS NOTES
The patient location is: home  The chief complaint leading to consultation is: follow up for anemia    Visit type: audiovisual    Face to Face time with patient: 20 min  30 minutes of total time spent on the encounter, which includes face to face time and non-face to face time preparing to see the patient (eg, review of tests), Obtaining and/or reviewing separately obtained history, Documenting clinical information in the electronic or other health record, Independently interpreting results (not separately reported) and communicating results to the patient/family/caregiver, or Care coordination (not separately reported).         Each patient to whom he or she provides medical services by telemedicine is:  (1) informed of the relationship between the physician and patient and the respective role of any other health care provider with respect to management of the patient; and (2) notified that he or she may decline to receive medical services by telemedicine and may withdraw from such care at any time.    Notes:             PROGRESS NOTE     Subjective:       Patient ID: Nalini Varma is a 71 y.o. female.     Chief Complaint: follow up for chronic anemia     Diagnosis:  1. Anemia of chronic disease  2. Stage I Lung adenocarcinoma of right upper lobe, lepidic pattern, s/p SBRT in Oct/Nov 2021     Oncologic History:  1. Ms Varma is a 70 yo woman with chronic pain, anxiety, COPD on 2 liters of home oxygen, CHF with preserved ejection fraction, HTN, CKD stage 3 (creatinine 1.6), who presents for further evaluation of newly diagnosed lung adenocarcinoma. She underwent a screening CT chest on 7/23/20, which showed a 0.6 cm right upper lobe noncalcified pulmonary nodule which measured 0.4 cm on previous CT dated 03/20/2019.  There is a pleural based 1.2 cm pulmonary nodule within the right upper lobe which measured 0.9 cm on previous CT.  There is a 1.2 cm partially spiculated right upper lobe noncalcified pulmonary  nodule which measured 0.6 cm on previous CT. She underwent a right lung biopsy on 2020. Pathology showed adenocarcinoma, well differentiated, lepidic pattern, cannot rule out an invasive component. She presents today for further evaluation. Has been smoking 2PPD for 50 years. Enrolled in smoking cessation program. Now down to 1PPD. Has been on 2L O2 for many years. Needs to use 3 liters when she is wearing a mask. Has CHF. On torsemide. Uses two pillows at night. Unable to walk for a block due to dyspnea. No weight loss. Lives by herself  2. CT head negative for metastases. PET scan 10/7/20: Two right upper lobe nodule with no appreciable activity.  Please note that FDG PET has poor sensitivity for small, well differentiated, and/or indolent malignancies.  An additional subcentimeter node in the apex is too small to characterize by PET for which attention on follow-up is recommended. Mildly enlarged mediastinal lymph nodes, as above, with no hypermetabolic activity.  Metastatic involvement is not excluded.  3. Seen by Dr Mead. Underwent SBRT 10/29/21-21  4. Hospitalized in early 2021 for melena. EGD showed residual food. Otherwise normal. Patient was advised to f/u with GI in 3 weeks. Anemia was stable throughout hospitalization.      Interval History:   Ms Varma returns to discuss test results. Chronic dyspnea with exertion. Wearing oxygen.   PET scan shows concern for possible progression.      ECO     ROS:   A ten-point system review is obtained and negative except for what was stated in the Interval History.      Physical Examination:   General: well hydrated, well developed, in no acute distress, wearing oxygen  HEENT: normocephalic, PERRLA, EOMI, anicteric sclerae  Psych: pleasant and appropriate mood and affect     Objective:      Laboratory Data:  Labs reviewed.      Imaging Data:  PET scan 10/7/2020:  Impression:     1. Two right upper lobe nodule with no appreciable activity.   Please note that FDG PET has poor sensitivity for small, well differentiated, and/or indolent malignancies.  An additional subcentimeter node in the apex is too small to characterize by PET for which attention on follow-up is recommended.  2. Mildly enlarged mediastinal lymph nodes, as above, with no hypermetabolic activity.  Metastatic involvement is not excluded.  3. Additional CT findings, as above.     CT head 9/25/20:  Impression:     No evidence of acute intracranial pathology.     Moderate patchy mucoperiosteal thickening in the paranasal sinuses.    CT chest 2/11/2021:  Development of central cavitation in previously described 12 mm right upper lobe pulmonary nodule which may be inflammatory in nature including tuberculous and non tuberculous mycobacterial infection, fungal infection etc.  Neoplasm is also included in the differential.     Improved subpleural right upper lobe nodular opacity and stable ground-glass nodule right apex.     Stable calcified granuloma and postinflammatory changes in the lingula probably related to previous non tuberculous mycobacterial infection.     Severe atherosclerosis and severe hepatic steatosis       PET scan July 2022  Impression:     1.1 cm left upper lobe nodule with minimal tracer uptake.  Difficult to characterize on PET due to small size although remains concerning for neoplasm considering increase in size over prior CTs.  Additional stable subcentimeter left upper lobe nodule which is too small to characterize with PET.     Stable bandlike opacity and volume loss in the right upper lobe with low level radiotracer uptake.  Findings favored to represent post treatment changes.     Borderline mediastinal lymphadenopathy with radiotracer uptake similar to blood pool.  Findings could represent reactive etiology or metastasis.     Assessment and Plan:      1. Iron deficiency anemia due to chronic blood loss    2. History of lung cancer    3. Pancreatic lesion    4.  Nutritional anemia        Chronic anemia  - reviewed lab results with patient. Ferritin, B12, folate levels are good. T sat is low at 13. Plan for venofer. Risks and benefits were d/w pt and family.   -RTC 6 months    Pancreatic lesion- being followed by Dr Cheema.     Hx of lung cancer   - followed by Dr Mead. S/p SBRT in Oct/Nov 2020. F/u CT scans have been unremarkable. PET scan reviewed. Pt will f/u with Dr Mead for now       Orders Placed This Encounter    Ferritin    CBC Auto Differential    Iron and TIBC    Methylmalonic Acid, Serum    Vitamin B12    FOLATE       Future Appointments   Date Time Provider Department Center   8/1/2022  1:30 PM [employer] MEDICINE, TECH SUPPORT [facility] Virtual   8/4/2022 11:15 AM [facility] USOP2 [facility] USOUNDO [facility]   8/4/2022 12:00 PM LAB, [facility] LABDRAW [facility]   8/4/2022  1:00 PM Yane Sahni MD [facility] IM [facility]   8/4/2022  2:40 PM Keesha Johansen NP [facility] [id number] [facility]   8/5/2022  1:00 PM [facility] MAMMO1 [facility] MAMMO [facility]   8/5/2022  2:00 PM [facility] PSYCH Lj Johnson

## 2022-07-29 ENCOUNTER — PATIENT MESSAGE (OUTPATIENT)
Dept: RADIATION ONCOLOGY | Facility: CLINIC | Age: 72
End: 2022-07-29
Payer: MEDICARE

## 2022-08-03 ENCOUNTER — TELEPHONE (OUTPATIENT)
Dept: RADIATION ONCOLOGY | Facility: CLINIC | Age: 72
End: 2022-08-03
Payer: MEDICARE

## 2022-08-03 ENCOUNTER — DOCUMENTATION ONLY (OUTPATIENT)
Dept: CARDIOTHORACIC SURGERY | Facility: HOSPITAL | Age: 72
End: 2022-08-03
Payer: MEDICARE

## 2022-08-03 DIAGNOSIS — R91.1 NODULE OF APEX OF LEFT LUNG: Primary | ICD-10-CM

## 2022-08-03 NOTE — TELEPHONE ENCOUNTER
I spoke with Ms. Varma after her case was reviewed at the weekly Thoracic Multidisciplinary Conference today. Her recent PET and CT images were reviewed, and the group felt that the growing Left upper lobe nodule and station 6 mediastinal node were likely involved by cancer. Pulmonology did not feel that either site was amenable to EBUS/robotic bronch to obtain tissue diagnosis.     I discussed options with her includin) Asking IR if percutaneous biopsy of JASSI nodule was feasible to confirm malignancy.   2) Proceeding with treatment: either standard chemo-RT if Dr. Gilmore felt she could tolerate chemo OR definitive hypofractionated radiation alone if not a systemic candidate.     At the end of our discussion, she wished to pursue biopsy if that is an option. I will reach out to Dr. Webb and Dr. Nunez to see if the JASSI nodule appears reachable by her imaging, then we will coordinate from there.

## 2022-08-03 NOTE — PATIENT CARE CONFERENCE
[facility]      THORACIC MULTIDISCIPLINARY TUMOR BOARD  PATIENT REVIEW FORM  ________________________________________________________________________    CLINIC #: [id number]  DATE: 08/03/2022    DIAGNOSIS:   NSCLC    PRESENTER:   Dr. Mead     PATIENT SUMMARY:   71 y.o. y/o female with likely synchronous primary Stage IA2 (cT1b cN0 M0) RUL NSCLC (adeno; no actionable mutations) diagnosed on biopsy of the spiculated nodule 9/14/20. In addition there was a slightly more superior, pleural-based lesion that increased in size and solidity. She was not a candidate for resection due to COPD and CHF. She completed SBRT 55 Gy in 5 fractions to both RUL nodules on 11/4/20. No late toxicity from treatment. CT Chest 7/1/22 demonstrates interval enlargement of BENNY nodule to 1.0 cm; the other two nodules in BENNY are <0.4 cm and stable. PET Benny SUV 1.3 and mediastinal adenopathy 2.3.     BOARD RECOMMENDATIONS:   Progressing BENNY nodule and pre vascular adenopathy concerning for stage III disease. Difficult locations for biopsy. Poor surgical candidate. Can refer to oncology regarding systemic therapy options. If not a candidate for systemic options, offer radiation to nodule and prevascular adenopathy.     CONSULT NEEDED:     [] Surgery    [x] Hem/Onc    [x] Rad/Onc    [] Dietary                 [] Social Service    [] Psychology       [] Pulmonology    Clinical Stage: Tumor 1 Node(s) 2 Metastasis    Pathologic Stage: Tumor  Node(s)  Metastasis     GROUP STAGE:     [] O    [] 1A    [] IB    [] IIA    [] IIB     [x] IIIA     [] IIIB     [] IIIC    [] IV                               [] Local recurrence     [] Regional recurrence     [] Distant recurrence                   [x] NSCLC     [] SCLC     Tumor type     Unstageable:      [] Yes     [] No  Metastatic site(s):          [x] Claritza'l Treatment Guidelines reviewed and care planned is consistent with guidelines.         (i.e., NCCN, NCI, PD, ACO, AUA, etc.)    PRESENTATION AT  CANCER CONFERENCE:         [] Prospective    [] Retrospective     [] Follow-Up          [] Eligible for clinical trial

## 2022-08-04 ENCOUNTER — PATIENT MESSAGE (OUTPATIENT)
Dept: RADIATION ONCOLOGY | Facility: CLINIC | Age: 72
End: 2022-08-04
Payer: MEDICARE

## 2022-08-04 ENCOUNTER — TELEPHONE (OUTPATIENT)
Dept: INFUSION THERAPY | Facility: OTHER | Age: 72
End: 2022-08-04
Payer: MEDICARE

## 2022-08-04 NOTE — TELEPHONE ENCOUNTER
Returned pt's call  All questions answered ab Iron infusion at this time.    ----- Message from Ann Gilmore MD sent at 7/28/2022 11:53 AM CDT -----  Venofer 200x  5 doses   RTC 6 months with cbc, iron  , b12, folate, mma, ferritin.

## 2022-08-05 ENCOUNTER — OFFICE VISIT (OUTPATIENT)
Dept: PSYCHIATRY | Facility: CLINIC | Age: 72
End: 2022-08-05
Payer: COMMERCIAL

## 2022-08-05 DIAGNOSIS — F33.2 MDD (MAJOR DEPRESSIVE DISORDER), RECURRENT SEVERE, WITHOUT PSYCHOSIS: Primary | ICD-10-CM

## 2022-08-05 DIAGNOSIS — F43.10 PTSD (POST-TRAUMATIC STRESS DISORDER): ICD-10-CM

## 2022-08-05 DIAGNOSIS — F41.1 GENERALIZED ANXIETY DISORDER: ICD-10-CM

## 2022-08-05 DIAGNOSIS — E55.9 VITAMIN D DEFICIENCY: ICD-10-CM

## 2022-08-05 PROCEDURE — 99214 OFFICE O/P EST MOD 30 MIN: CPT | Mod: 95,,, | Performed by: PHYSICIAN ASSISTANT

## 2022-08-05 PROCEDURE — 4010F PR ACE/ARB THEARPY RXD/TAKEN: ICD-10-PCS | Mod: CPTII,95,, | Performed by: PHYSICIAN ASSISTANT

## 2022-08-05 PROCEDURE — 99214 PR OFFICE/OUTPT VISIT, EST, LEVL IV, 30-39 MIN: ICD-10-PCS | Mod: 95,,, | Performed by: PHYSICIAN ASSISTANT

## 2022-08-05 PROCEDURE — 4010F ACE/ARB THERAPY RXD/TAKEN: CPT | Mod: CPTII,95,, | Performed by: PHYSICIAN ASSISTANT

## 2022-08-05 PROCEDURE — 3044F PR MOST RECENT HEMOGLOBIN A1C LEVEL <7.0%: ICD-10-PCS | Mod: CPTII,95,, | Performed by: PHYSICIAN ASSISTANT

## 2022-08-05 PROCEDURE — 3044F HG A1C LEVEL LT 7.0%: CPT | Mod: CPTII,95,, | Performed by: PHYSICIAN ASSISTANT

## 2022-08-05 RX ORDER — DULOXETIN HYDROCHLORIDE 60 MG/1
60 CAPSULE, DELAYED RELEASE ORAL DAILY
Qty: 90 CAPSULE | Refills: 2 | Status: SHIPPED | OUTPATIENT
Start: 2022-08-05 | End: 2023-05-22 | Stop reason: SDUPTHER

## 2022-08-05 RX ORDER — MIRTAZAPINE 7.5 MG/1
7.5 TABLET, FILM COATED ORAL NIGHTLY
Qty: 30 TABLET | Refills: 2 | Status: SHIPPED | OUTPATIENT
Start: 2022-08-05 | End: 2022-09-06
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
## 2022-08-10 ENCOUNTER — INFUSION (OUTPATIENT)
Dept: INFUSION THERAPY | Facility: OTHER | Age: 72
End: 2022-08-10
Attending: STUDENT IN AN ORGANIZED HEALTH CARE EDUCATION/TRAINING PROGRAM
Payer: MEDICARE

## 2022-08-10 ENCOUNTER — OFFICE VISIT (OUTPATIENT)
Dept: HEMATOLOGY/ONCOLOGY | Facility: CLINIC | Age: 72
End: 2022-08-10
Payer: MEDICARE

## 2022-08-10 VITALS
HEART RATE: 81 BPM | OXYGEN SATURATION: 95 % | DIASTOLIC BLOOD PRESSURE: 57 MMHG | TEMPERATURE: 98 F | RESPIRATION RATE: 16 BRPM | SYSTOLIC BLOOD PRESSURE: 102 MMHG

## 2022-08-10 VITALS
HEART RATE: 86 BPM | SYSTOLIC BLOOD PRESSURE: 138 MMHG | RESPIRATION RATE: 17 BRPM | OXYGEN SATURATION: 96 % | DIASTOLIC BLOOD PRESSURE: 62 MMHG | TEMPERATURE: 98 F

## 2022-08-10 DIAGNOSIS — D50.0 IRON DEFICIENCY ANEMIA DUE TO CHRONIC BLOOD LOSS: Primary | ICD-10-CM

## 2022-08-10 DIAGNOSIS — K86.9 PANCREATIC LESION: ICD-10-CM

## 2022-08-10 DIAGNOSIS — C34.92 RECURRENT ADENOCARCINOMA OF LEFT LUNG: Primary | ICD-10-CM

## 2022-08-10 DIAGNOSIS — D50.0 IRON DEFICIENCY ANEMIA DUE TO CHRONIC BLOOD LOSS: ICD-10-CM

## 2022-08-10 PROCEDURE — 99214 PR OFFICE/OUTPT VISIT, EST, LEVL IV, 30-39 MIN: ICD-10-PCS | Mod: S$GLB,,, | Performed by: STUDENT IN AN ORGANIZED HEALTH CARE EDUCATION/TRAINING PROGRAM

## 2022-08-10 PROCEDURE — 63600175 PHARM REV CODE 636 W HCPCS: Performed by: STUDENT IN AN ORGANIZED HEALTH CARE EDUCATION/TRAINING PROGRAM

## 2022-08-10 PROCEDURE — 25000003 PHARM REV CODE 250: Performed by: STUDENT IN AN ORGANIZED HEALTH CARE EDUCATION/TRAINING PROGRAM

## 2022-08-10 PROCEDURE — 3288F FALL RISK ASSESSMENT DOCD: CPT | Mod: CPTII,S$GLB,, | Performed by: STUDENT IN AN ORGANIZED HEALTH CARE EDUCATION/TRAINING PROGRAM

## 2022-08-10 PROCEDURE — 1125F PR PAIN SEVERITY QUANTIFIED, PAIN PRESENT: ICD-10-PCS | Mod: CPTII,S$GLB,, | Performed by: STUDENT IN AN ORGANIZED HEALTH CARE EDUCATION/TRAINING PROGRAM

## 2022-08-10 PROCEDURE — 4010F PR ACE/ARB THEARPY RXD/TAKEN: ICD-10-PCS | Mod: CPTII,S$GLB,, | Performed by: STUDENT IN AN ORGANIZED HEALTH CARE EDUCATION/TRAINING PROGRAM

## 2022-08-10 PROCEDURE — 1160F PR REVIEW ALL MEDS BY PRESCRIBER/CLIN PHARMACIST DOCUMENTED: ICD-10-PCS | Mod: CPTII,S$GLB,, | Performed by: STUDENT IN AN ORGANIZED HEALTH CARE EDUCATION/TRAINING PROGRAM

## 2022-08-10 PROCEDURE — 1159F PR MEDICATION LIST DOCUMENTED IN MEDICAL RECORD: ICD-10-PCS | Mod: CPTII,S$GLB,, | Performed by: STUDENT IN AN ORGANIZED HEALTH CARE EDUCATION/TRAINING PROGRAM

## 2022-08-10 PROCEDURE — 1159F MED LIST DOCD IN RCRD: CPT | Mod: CPTII,S$GLB,, | Performed by: STUDENT IN AN ORGANIZED HEALTH CARE EDUCATION/TRAINING PROGRAM

## 2022-08-10 PROCEDURE — 4010F ACE/ARB THERAPY RXD/TAKEN: CPT | Mod: CPTII,S$GLB,, | Performed by: STUDENT IN AN ORGANIZED HEALTH CARE EDUCATION/TRAINING PROGRAM

## 2022-08-10 PROCEDURE — 1101F PT FALLS ASSESS-DOCD LE1/YR: CPT | Mod: CPTII,S$GLB,, | Performed by: STUDENT IN AN ORGANIZED HEALTH CARE EDUCATION/TRAINING PROGRAM

## 2022-08-10 PROCEDURE — 3075F SYST BP GE 130 - 139MM HG: CPT | Mod: CPTII,S$GLB,, | Performed by: STUDENT IN AN ORGANIZED HEALTH CARE EDUCATION/TRAINING PROGRAM

## 2022-08-10 PROCEDURE — 99499 RISK ADDL DX/OHS AUDIT: ICD-10-PCS | Mod: S$PBB,,, | Performed by: STUDENT IN AN ORGANIZED HEALTH CARE EDUCATION/TRAINING PROGRAM

## 2022-08-10 PROCEDURE — 1101F PR PT FALLS ASSESS DOC 0-1 FALLS W/OUT INJ PAST YR: ICD-10-PCS | Mod: CPTII,S$GLB,, | Performed by: STUDENT IN AN ORGANIZED HEALTH CARE EDUCATION/TRAINING PROGRAM

## 2022-08-10 PROCEDURE — 99499 UNLISTED E&M SERVICE: CPT | Mod: S$PBB,,, | Performed by: STUDENT IN AN ORGANIZED HEALTH CARE EDUCATION/TRAINING PROGRAM

## 2022-08-10 PROCEDURE — 3044F PR MOST RECENT HEMOGLOBIN A1C LEVEL <7.0%: ICD-10-PCS | Mod: CPTII,S$GLB,, | Performed by: STUDENT IN AN ORGANIZED HEALTH CARE EDUCATION/TRAINING PROGRAM

## 2022-08-10 PROCEDURE — 96365 THER/PROPH/DIAG IV INF INIT: CPT

## 2022-08-10 PROCEDURE — 3078F DIAST BP <80 MM HG: CPT | Mod: CPTII,S$GLB,, | Performed by: STUDENT IN AN ORGANIZED HEALTH CARE EDUCATION/TRAINING PROGRAM

## 2022-08-10 PROCEDURE — 1160F RVW MEDS BY RX/DR IN RCRD: CPT | Mod: CPTII,S$GLB,, | Performed by: STUDENT IN AN ORGANIZED HEALTH CARE EDUCATION/TRAINING PROGRAM

## 2022-08-10 PROCEDURE — 3078F PR MOST RECENT DIASTOLIC BLOOD PRESSURE < 80 MM HG: ICD-10-PCS | Mod: CPTII,S$GLB,, | Performed by: STUDENT IN AN ORGANIZED HEALTH CARE EDUCATION/TRAINING PROGRAM

## 2022-08-10 PROCEDURE — 1125F AMNT PAIN NOTED PAIN PRSNT: CPT | Mod: CPTII,S$GLB,, | Performed by: STUDENT IN AN ORGANIZED HEALTH CARE EDUCATION/TRAINING PROGRAM

## 2022-08-10 PROCEDURE — 3075F PR MOST RECENT SYSTOLIC BLOOD PRESS GE 130-139MM HG: ICD-10-PCS | Mod: CPTII,S$GLB,, | Performed by: STUDENT IN AN ORGANIZED HEALTH CARE EDUCATION/TRAINING PROGRAM

## 2022-08-10 PROCEDURE — 3044F HG A1C LEVEL LT 7.0%: CPT | Mod: CPTII,S$GLB,, | Performed by: STUDENT IN AN ORGANIZED HEALTH CARE EDUCATION/TRAINING PROGRAM

## 2022-08-10 PROCEDURE — 99999 PR PBB SHADOW E&M-EST. PATIENT-LVL V: CPT | Mod: PBBFAC,,, | Performed by: STUDENT IN AN ORGANIZED HEALTH CARE EDUCATION/TRAINING PROGRAM

## 2022-08-10 PROCEDURE — 99214 OFFICE O/P EST MOD 30 MIN: CPT | Mod: S$GLB,,, | Performed by: STUDENT IN AN ORGANIZED HEALTH CARE EDUCATION/TRAINING PROGRAM

## 2022-08-10 PROCEDURE — 3288F PR FALLS RISK ASSESSMENT DOCUMENTED: ICD-10-PCS | Mod: CPTII,S$GLB,, | Performed by: STUDENT IN AN ORGANIZED HEALTH CARE EDUCATION/TRAINING PROGRAM

## 2022-08-10 PROCEDURE — 99999 PR PBB SHADOW E&M-EST. PATIENT-LVL V: ICD-10-PCS | Mod: PBBFAC,,, | Performed by: STUDENT IN AN ORGANIZED HEALTH CARE EDUCATION/TRAINING PROGRAM

## 2022-08-10 RX ORDER — HEPARIN 100 UNIT/ML
500 SYRINGE INTRAVENOUS
Status: CANCELLED | OUTPATIENT
Start: 2022-08-17

## 2022-08-10 RX ORDER — SODIUM CHLORIDE 0.9 % (FLUSH) 0.9 %
10 SYRINGE (ML) INJECTION
Status: CANCELLED | OUTPATIENT
Start: 2022-08-17

## 2022-08-10 RX ORDER — SODIUM CHLORIDE 0.9 % (FLUSH) 0.9 %
10 SYRINGE (ML) INJECTION
Status: DISCONTINUED | OUTPATIENT
Start: 2022-08-10 | End: 2022-08-10 | Stop reason: HOSPADM

## 2022-08-10 RX ORDER — HEPARIN 100 UNIT/ML
500 SYRINGE INTRAVENOUS
Status: DISCONTINUED | OUTPATIENT
Start: 2022-08-10 | End: 2022-08-10 | Stop reason: HOSPADM

## 2022-08-10 RX ADMIN — SODIUM CHLORIDE: 0.9 INJECTION, SOLUTION INTRAVENOUS at 01:08

## 2022-08-10 RX ADMIN — IRON SUCROSE 200 MG: 20 INJECTION, SOLUTION INTRAVENOUS at 02:08

## 2022-08-10 NOTE — PLAN OF CARE
Vital Signs Stable, No apparent distress noted; Pt tolerated __Venofer___ infusion w/o difficulty.  24g piv removed;No bleeding,swelling or drainage noted to site;coban and gauze applied.  Discharge instructions given;Pt verbalizes understanding. Next appointment scheduled  Pt discharged and  transported to Hem/Onc clinic via her wheelchair, voices no complaints or concerns

## 2022-08-10 NOTE — PROGRESS NOTES
PROGRESS NOTE     Subjective:       Patient ID: Nalini Varma is a 71 y.o. female.     Chief Complaint: follow up for chronic anemia     Diagnosis:  1. Anemia of chronic disease  2. Stage I Lung adenocarcinoma of right upper lobe, lepidic pattern, s/p SBRT in Oct/Nov 2021     Oncologic History:  1. Ms Varma is a 70 yo woman with chronic pain, anxiety, COPD on 2 liters of home oxygen, CHF with preserved ejection fraction, HTN, CKD stage 3 (creatinine 1.6), who presents for further evaluation of newly diagnosed lung adenocarcinoma. She underwent a screening CT chest on 7/23/20, which showed a 0.6 cm right upper lobe noncalcified pulmonary nodule which measured 0.4 cm on previous CT dated 03/20/2019.  There is a pleural based 1.2 cm pulmonary nodule within the right upper lobe which measured 0.9 cm on previous CT.  There is a 1.2 cm partially spiculated right upper lobe noncalcified pulmonary nodule which measured 0.6 cm on previous CT. She underwent a right lung biopsy on 9/4/2020. Pathology showed adenocarcinoma, well differentiated, lepidic pattern, cannot rule out an invasive component. She presents today for further evaluation. Has been smoking 2PPD for 50 years. Enrolled in smoking cessation program. Now down to 1PPD. Has been on 2L O2 for many years. Needs to use 3 liters when she is wearing a mask. Has CHF. On torsemide. Uses two pillows at night. Unable to walk for a block due to dyspnea. No weight loss. Lives by herself  2. CT head negative for metastases. PET scan 10/7/20: Two right upper lobe nodule with no appreciable activity.  Please note that FDG PET has poor sensitivity for small, well differentiated, and/or indolent malignancies.  An additional subcentimeter node in the apex is too small to characterize by PET for which attention on follow-up is recommended. Mildly enlarged mediastinal lymph nodes, as above, with no hypermetabolic activity.  Metastatic involvement is not excluded.  3.  Seen by Dr Mead. Underwent SBRT 10/29/21-21  4. Hospitalized in early 2021 for melena. EGD showed residual food. Otherwise normal. Patient was advised to f/u with GI in 3 weeks. Anemia was stable throughout hospitalization.      Interval History:   Ms Varma returns to discuss test results. Chronic dyspnea with exertion. Wearing oxygen.   PET scan shows concern for possible progression. She denied any chest pain or worsening sob. She uses 2-3 L oxygen at home. She denied any hemoptysis.  Started on iron infusions.      ECO     ROS:   A ten-point system review is obtained and negative except for what was stated in the Interval History.      Physical Examination:   General: well hydrated, well developed, in no acute distress, wearing oxygen  HEENT: normocephalic, PERRLA, EOMI, anicteric sclerae  Psych: pleasant and appropriate mood and affect     Objective:      Laboratory Data:  Labs reviewed.      Imaging Data:  PET scan 10/7/2020:  Impression:     1. Two right upper lobe nodule with no appreciable activity.  Please note that FDG PET has poor sensitivity for small, well differentiated, and/or indolent malignancies.  An additional subcentimeter node in the apex is too small to characterize by PET for which attention on follow-up is recommended.  2. Mildly enlarged mediastinal lymph nodes, as above, with no hypermetabolic activity.  Metastatic involvement is not excluded.  3. Additional CT findings, as above.     CT head 20:  Impression:     No evidence of acute intracranial pathology.     Moderate patchy mucoperiosteal thickening in the paranasal sinuses.    CT chest 2021:  Development of central cavitation in previously described 12 mm right upper lobe pulmonary nodule which may be inflammatory in nature including tuberculous and non tuberculous mycobacterial infection, fungal infection etc.  Neoplasm is also included in the differential.     Improved subpleural right upper lobe nodular  opacity and stable ground-glass nodule right apex.     Stable calcified granuloma and postinflammatory changes in the lingula probably related to previous non tuberculous mycobacterial infection.     Severe atherosclerosis and severe hepatic steatosis            Assessment and Plan:      1. Recurrent adenocarcinoma of left lung    2. Iron deficiency anemia due to chronic blood loss    3. Pancreatic lesion      Recurrent lung cancer   Pt had adenocarcinoma which was treated with SBRT in Oct/Nov 2020 with Dr Mead. Recent imaging with CT and PET concerning for recurrence and jamal involvement. She is poor surgical candidate. Dr Mead is checking with IR if repeat biopsy can be safely done.   We discussed weekly chemo along with radiation. If pt is not able to tolerate the chemo, will dose reduce or discontinue chemo. Pt understands the risks and benefits and prefers to try chemo with radiation with the understanding that if treatment becomes too uncomfortable we will hold chemo and pursue with radiation alone.   Will plan for carbo taxol.     LESLY- on venofer, monitor    Pancreatic lesion- being followed by Dr Cheema.              Future Appointments   Date Time Provider Department Center   8/17/2022  2:00 PM NON-CHEMO 01 [facility]   9/1/2022 10:00 AM Keesha Johansen NP [facility] [id number] [facility]   9/1/2022 11:15 AM [facility] USOP2 [facility] USOUNDO [facility]   9/6/2022 11:00 AM JESE [facility] PSYCH [address]   9/21/2022 11:15 AM [facility] MAMMO1 [facility] MAMMO [facility]   9/21/2022 12:00 PM Yane Sahni MD [facility] IM [facility]     I spent >30 mins on reviewing epic chart notes, reviewing tests, nursing concerns,obtaining history, performing physical exam, counseling and educating patient/family/caregiver, documentation, independently interpreting results and discussing them with patient/family/caregiver, care coordination, ordering medications/ tests/ procedures and referring and communicating with  other health care professionals.

## 2022-08-11 ENCOUNTER — TELEPHONE (OUTPATIENT)
Dept: INTERVENTIONAL RADIOLOGY/VASCULAR | Facility: CLINIC | Age: 72
End: 2022-08-11
Payer: MEDICARE

## 2022-08-11 NOTE — TELEPHONE ENCOUNTER
Spoke to patient today, Patient schedule for IR virtual clinic visit on Tues 8/16/2022 to discuss Left lung biopsy. Ref by Dr. Casey Mead. Pt confirmed. Message to sent to provider.

## 2022-08-16 ENCOUNTER — CLINICAL SUPPORT (OUTPATIENT)
Dept: INTERVENTIONAL RADIOLOGY/VASCULAR | Facility: CLINIC | Age: 72
End: 2022-08-16
Payer: MEDICARE

## 2022-08-16 DIAGNOSIS — Z85.118 HISTORY OF LUNG CANCER: Primary | ICD-10-CM

## 2022-08-16 DIAGNOSIS — R91.1 PULMONARY NODULE: ICD-10-CM

## 2022-08-16 NOTE — PROGRESS NOTES
The patient location is: [place], [place]  The chief complaint leading to consultation is: Left lung lesion    Visit type: audiovisual    Face to Face time with patient:   30 minutes of total time spent on the encounter, which includes face to face time and non-face to face time preparing to see the patient (eg, review of tests), Obtaining and/or reviewing separately obtained history, Documenting clinical information in the electronic or other health record, Independently interpreting results (not separately reported) and communicating results to the patient/family/caregiver, or Care coordination (not separately reported).     Each patient to whom he or she provides medical services by telemedicine is:  (1) informed of the relationship between the physician and patient and the respective role of any other health care provider with respect to management of the patient; and (2) notified that he or she may decline to receive medical services by telemedicine and may withdraw from such care at any time.    Notes:   Radiology Outpatient Clinic Note    Nalini Varma is a 71 y.o. female with a history of right lung adenocarcinoma. She presents today via telemedicine visit to discuss a left lung biopsy. PET/CT on 7/1/22 shows a 1.1 cm JASSI pulmonary nodule with minimal tracer uptake. Given that it has been increasing in size, biopsy was recommended. Of note, patient underwent a biopsy of her right lung previously and tolerated moderate sedation well.     Past Medical History:   Diagnosis Date    Anxiety     Cervical spinal stenosis     Choledocholithiasis     Chronic obstructive pulmonary disease 03/16/2016    Degenerative disc disease of cervical spine     Diverticulosis 04/24/2018    History of alcohol abuse 03/02/2021    History of gastric ulcer     History of L3 compression fracture 12/19/2018    History of lung cancer     s/p completion of XRT in 10/2020    Hyperlipidemia     Iron deficiency anemia      Osteoarthritis     Stage III CKD 2017     Past Surgical History:   Procedure Laterality Date    BREAST CYST EXCISION Right     1967    CATARACT EXTRACTION      COLONOSCOPY  2015    COLONOSCOPY N/A 6/8/2022    Procedure: COLONOSCOPY;  Surgeon: Helio Cheema MD;  Location: [facility] (2ND FLR);  Service: Endoscopy;  Laterality: N/A;  5/25-Pt requesting Dr. Cheema-approval given per Dr. Cheema-ml  Fully vaccinated, prep instr portal -ml    CORONARY ANGIOGRAPHY N/A 7/20/2018    Procedure: ANGIOGRAM, CORONARY ARTERY;  Surgeon: Willard Roberts MD;  Location: [facility] CATH LAB;  Service: Cardiovascular;  Laterality: N/A;    ENDOSCOPIC ULTRASOUND OF UPPER GASTROINTESTINAL TRACT N/A 3/24/2021    Procedure: ULTRASOUND, UPPER GI TRACT, ENDOSCOPIC;  Surgeon: Helio Cheema MD;  Location: [facility] (2ND FLR);  Service: Endoscopy;  Laterality: N/A;    ENDOSCOPIC ULTRASOUND OF UPPER GASTROINTESTINAL TRACT N/A 4/25/2022    Procedure: ULTRASOUND, UPPER GI TRACT, ENDOSCOPIC;  Surgeon: Helio Cheema MD;  Location: [facility] (2ND FLR);  Service: Endoscopy;  Laterality: N/A;  cardiac risk assessment 1 per Dr. Ortez. see t/e 3/17-SC  3/25:new instructions via portal. home with medical transport?-SC    ERCP N/A 3/24/2021    Procedure: ERCP (ENDOSCOPIC RETROGRADE CHOLANGIOPANCREATOGRAPHY);  Surgeon: Helio Cheema MD;  Location: [facility] (2ND FLR);  Service: Endoscopy;  Laterality: N/A;    ERCP N/A 4/30/2021    Procedure: ERCP (ENDOSCOPIC RETROGRADE CHOLANGIOPANCREATOGRAPHY);  Surgeon: Boo Gray MD;  Location: [facility] ENDO (2ND FLR);  Service: Endoscopy;  Laterality: N/A;    ERCP N/A 7/1/2021    Procedure: ERCP (ENDOSCOPIC RETROGRADE CHOLANGIOPANCREATOGRAPHY);  Surgeon: Helio Cheema MD;  Location: [facility] ENDO (2ND FLR);  Service: Endoscopy;  Laterality: N/A;  Ok for Taxi. Dr Cheema  rapid covid 1130am- tb inst email    ESOPHAGOGASTRODUODENOSCOPY  2015    ESOPHAGOGASTRODUODENOSCOPY N/A 3/4/2021     Procedure: EGD (ESOPHAGOGASTRODUODENOSCOPY);  Surgeon: Yenifer Ramirez MD;  Location: [facility];  Service: Endoscopy;  Laterality: N/A;    ESOPHAGOGASTRODUODENOSCOPY N/A 3/24/2021    Procedure: EGD (ESOPHAGOGASTRODUODENOSCOPY);  Surgeon: Helio Cheema MD;  [facility]  Service: Endoscopy;  Laterality: N/A;    EYE SURGERY  2016    Cataracts    FRACTURE SURGERY  2014    JOINT REPLACEMENT  2007    ORIF FEMUR FRACTURE Left     TOTAL KNEE ARTHROPLASTY Left 2007    TUBAL LIGATION         Imaging reviewed, PET/CT 7/1/22    Allergies:   Review of patient's allergies indicates:   Allergen Reactions    Wellbutrin [bupropion hcl] Other (See Comments)     Hyponatremia and hypomagnesia     Zoloft [sertraline] Other (See Comments)     Hyponatremia and hypomagnesia     Bananas [banana]      Emesis, and stomach cramps       Vitals (Most Recent):  Not available    Plan:   1. 71 y.o. F with history of lung CA presents with growing JASSI pulmonary nodule, referred for biopsy.   -Plan for CT-guided percutaneous JASSI lung lesion biopsy under moderate sedation. All of the patient's questions were answered and she was amendable to this plan.    Alexander Menon MD  Interventional Radiology  Department of Radiology

## 2022-08-17 ENCOUNTER — INFUSION (OUTPATIENT)
Dept: INFUSION THERAPY | Facility: OTHER | Age: 72
End: 2022-08-17
Attending: STUDENT IN AN ORGANIZED HEALTH CARE EDUCATION/TRAINING PROGRAM
Payer: MEDICARE

## 2022-08-17 VITALS
RESPIRATION RATE: 18 BRPM | DIASTOLIC BLOOD PRESSURE: 64 MMHG | SYSTOLIC BLOOD PRESSURE: 144 MMHG | HEART RATE: 80 BPM | TEMPERATURE: 98 F | OXYGEN SATURATION: 97 %

## 2022-08-17 DIAGNOSIS — D50.0 IRON DEFICIENCY ANEMIA DUE TO CHRONIC BLOOD LOSS: Primary | ICD-10-CM

## 2022-08-17 PROCEDURE — 25000003 PHARM REV CODE 250: Performed by: STUDENT IN AN ORGANIZED HEALTH CARE EDUCATION/TRAINING PROGRAM

## 2022-08-17 PROCEDURE — 96365 THER/PROPH/DIAG IV INF INIT: CPT

## 2022-08-17 PROCEDURE — 63600175 PHARM REV CODE 636 W HCPCS: Performed by: STUDENT IN AN ORGANIZED HEALTH CARE EDUCATION/TRAINING PROGRAM

## 2022-08-17 RX ORDER — SODIUM CHLORIDE 0.9 % (FLUSH) 0.9 %
10 SYRINGE (ML) INJECTION
Status: CANCELLED | OUTPATIENT
Start: 2022-08-24

## 2022-08-17 RX ORDER — SODIUM CHLORIDE 0.9 % (FLUSH) 0.9 %
10 SYRINGE (ML) INJECTION
Status: DISCONTINUED | OUTPATIENT
Start: 2022-08-17 | End: 2022-08-17 | Stop reason: HOSPADM

## 2022-08-17 RX ORDER — HEPARIN 100 UNIT/ML
500 SYRINGE INTRAVENOUS
Status: DISCONTINUED | OUTPATIENT
Start: 2022-08-17 | End: 2022-08-17 | Stop reason: HOSPADM

## 2022-08-17 RX ORDER — HEPARIN 100 UNIT/ML
500 SYRINGE INTRAVENOUS
Status: CANCELLED | OUTPATIENT
Start: 2022-08-24

## 2022-08-17 RX ADMIN — IRON SUCROSE 200 MG: 20 INJECTION, SOLUTION INTRAVENOUS at 02:08

## 2022-08-17 RX ADMIN — SODIUM CHLORIDE: 0.9 INJECTION, SOLUTION INTRAVENOUS at 02:08

## 2022-08-17 NOTE — PLAN OF CARE
Venofer infusion administered, no reaction. Patient tolerated well. 24 gauge IV removed, catheter intact. No apparent distress noted. Discharge instructions given to patient. Patient understands instructions. Follow up appointment scheduled.

## 2022-08-25 DIAGNOSIS — R91.1 PULMONARY NODULE: Primary | ICD-10-CM

## 2022-08-25 DIAGNOSIS — Z85.118 HISTORY OF LUNG CANCER: ICD-10-CM

## 2022-08-28 ENCOUNTER — PATIENT MESSAGE (OUTPATIENT)
Dept: INTERNAL MEDICINE | Facility: CLINIC | Age: 72
End: 2022-08-28
Payer: MEDICARE

## 2022-08-28 DIAGNOSIS — E78.5 HYPERLIPIDEMIA, UNSPECIFIED HYPERLIPIDEMIA TYPE: ICD-10-CM

## 2022-08-29 RX ORDER — ATORVASTATIN CALCIUM 40 MG/1
TABLET, FILM COATED ORAL
Qty: 90 TABLET | Refills: 3 | Status: ON HOLD | OUTPATIENT
Start: 2022-08-29 | End: 2023-06-15

## 2022-08-29 RX ORDER — TORSEMIDE 20 MG/1
20 TABLET ORAL DAILY
Qty: 90 TABLET | Refills: 3 | Status: SHIPPED | OUTPATIENT
Start: 2022-08-29 | End: 2023-03-09

## 2022-08-29 NOTE — TELEPHONE ENCOUNTER
No new care gaps identified.  [facility] Embedded Care Gaps. Reference number: [id number]. 8/29/2022   7:21:58 AM JERRODT

## 2022-09-01 ENCOUNTER — HOSPITAL ENCOUNTER (OUTPATIENT)
Dept: RADIOLOGY | Facility: OTHER | Age: 72
Discharge: HOME | End: 2022-09-01
Attending: INTERNAL MEDICINE
Payer: MEDICARE

## 2022-09-01 ENCOUNTER — OFFICE VISIT (OUTPATIENT)
Dept: OBSTETRICS AND GYNECOLOGY | Facility: CLINIC | Age: 72
End: 2022-09-01
Payer: MEDICARE

## 2022-09-01 VITALS — DIASTOLIC BLOOD PRESSURE: 60 MMHG | SYSTOLIC BLOOD PRESSURE: 110 MMHG | BODY MASS INDEX: 34.55 KG/M2 | HEIGHT: 66 IN

## 2022-09-01 DIAGNOSIS — N39.41 URGE INCONTINENCE OF URINE: ICD-10-CM

## 2022-09-01 DIAGNOSIS — L29.2 VULVAR ITCHING: ICD-10-CM

## 2022-09-01 DIAGNOSIS — Z01.419 WELL WOMAN EXAM WITH ROUTINE GYNECOLOGICAL EXAM: Primary | ICD-10-CM

## 2022-09-01 DIAGNOSIS — E04.2 MULTIPLE THYROID NODULES: ICD-10-CM

## 2022-09-01 PROCEDURE — 76536 US EXAM OF HEAD AND NECK: CPT | Mod: 26,,, | Performed by: STUDENT IN AN ORGANIZED HEALTH CARE EDUCATION/TRAINING PROGRAM

## 2022-09-01 PROCEDURE — 1159F MED LIST DOCD IN RCRD: CPT | Mod: CPTII,S$GLB,, | Performed by: NURSE PRACTITIONER

## 2022-09-01 PROCEDURE — 4010F PR ACE/ARB THEARPY RXD/TAKEN: ICD-10-PCS | Mod: CPTII,S$GLB,, | Performed by: NURSE PRACTITIONER

## 2022-09-01 PROCEDURE — G0101 CA SCREEN;PELVIC/BREAST EXAM: HCPCS | Mod: S$GLB,,, | Performed by: NURSE PRACTITIONER

## 2022-09-01 PROCEDURE — 3078F DIAST BP <80 MM HG: CPT | Mod: CPTII,S$GLB,, | Performed by: NURSE PRACTITIONER

## 2022-09-01 PROCEDURE — 4010F ACE/ARB THERAPY RXD/TAKEN: CPT | Mod: CPTII,S$GLB,, | Performed by: NURSE PRACTITIONER

## 2022-09-01 PROCEDURE — 3288F PR FALLS RISK ASSESSMENT DOCUMENTED: ICD-10-PCS | Mod: CPTII,S$GLB,, | Performed by: NURSE PRACTITIONER

## 2022-09-01 PROCEDURE — 1159F PR MEDICATION LIST DOCUMENTED IN MEDICAL RECORD: ICD-10-PCS | Mod: CPTII,S$GLB,, | Performed by: NURSE PRACTITIONER

## 2022-09-01 PROCEDURE — 76536 US SOFT TISSUE HEAD NECK THYROID: ICD-10-PCS | Mod: 26,,, | Performed by: STUDENT IN AN ORGANIZED HEALTH CARE EDUCATION/TRAINING PROGRAM

## 2022-09-01 PROCEDURE — 1100F PTFALLS ASSESS-DOCD GE2>/YR: CPT | Mod: CPTII,S$GLB,, | Performed by: NURSE PRACTITIONER

## 2022-09-01 PROCEDURE — 3008F PR BODY MASS INDEX (BMI) DOCUMENTED: ICD-10-PCS | Mod: CPTII,S$GLB,, | Performed by: NURSE PRACTITIONER

## 2022-09-01 PROCEDURE — 3288F FALL RISK ASSESSMENT DOCD: CPT | Mod: CPTII,S$GLB,, | Performed by: NURSE PRACTITIONER

## 2022-09-01 PROCEDURE — 99999 PR PBB SHADOW E&M-EST. PATIENT-LVL V: ICD-10-PCS | Mod: PBBFAC,,, | Performed by: NURSE PRACTITIONER

## 2022-09-01 PROCEDURE — 3044F HG A1C LEVEL LT 7.0%: CPT | Mod: CPTII,S$GLB,, | Performed by: NURSE PRACTITIONER

## 2022-09-01 PROCEDURE — 3008F BODY MASS INDEX DOCD: CPT | Mod: CPTII,S$GLB,, | Performed by: NURSE PRACTITIONER

## 2022-09-01 PROCEDURE — 3044F PR MOST RECENT HEMOGLOBIN A1C LEVEL <7.0%: ICD-10-PCS | Mod: CPTII,S$GLB,, | Performed by: NURSE PRACTITIONER

## 2022-09-01 PROCEDURE — 3074F SYST BP LT 130 MM HG: CPT | Mod: CPTII,S$GLB,, | Performed by: NURSE PRACTITIONER

## 2022-09-01 PROCEDURE — 99999 PR PBB SHADOW E&M-EST. PATIENT-LVL V: CPT | Mod: PBBFAC,,, | Performed by: NURSE PRACTITIONER

## 2022-09-01 PROCEDURE — 3078F PR MOST RECENT DIASTOLIC BLOOD PRESSURE < 80 MM HG: ICD-10-PCS | Mod: CPTII,S$GLB,, | Performed by: NURSE PRACTITIONER

## 2022-09-01 PROCEDURE — 1100F PR PT FALLS ASSESS DOC 2+ FALLS/FALL W/INJURY/YR: ICD-10-PCS | Mod: CPTII,S$GLB,, | Performed by: NURSE PRACTITIONER

## 2022-09-01 PROCEDURE — 1126F AMNT PAIN NOTED NONE PRSNT: CPT | Mod: CPTII,S$GLB,, | Performed by: NURSE PRACTITIONER

## 2022-09-01 PROCEDURE — 76536 US EXAM OF HEAD AND NECK: CPT | Mod: TC

## 2022-09-01 PROCEDURE — G0101 PR CA SCREEN;PELVIC/BREAST EXAM: ICD-10-PCS | Mod: S$GLB,,, | Performed by: NURSE PRACTITIONER

## 2022-09-01 PROCEDURE — 3074F PR MOST RECENT SYSTOLIC BLOOD PRESSURE < 130 MM HG: ICD-10-PCS | Mod: CPTII,S$GLB,, | Performed by: NURSE PRACTITIONER

## 2022-09-01 PROCEDURE — 1126F PR PAIN SEVERITY QUANTIFIED, NO PAIN PRESENT: ICD-10-PCS | Mod: CPTII,S$GLB,, | Performed by: NURSE PRACTITIONER

## 2022-09-01 RX ORDER — METOPROLOL TARTRATE 50 MG/1
50 TABLET ORAL
Status: ON HOLD | COMMUNITY
End: 2022-09-14 | Stop reason: HOSPADM

## 2022-09-01 RX ORDER — HYDROCODONE BITARTRATE AND ACETAMINOPHEN 10; 325 MG/1; MG/1
1 TABLET ORAL
Status: ON HOLD | COMMUNITY
End: 2022-09-14 | Stop reason: HOSPADM

## 2022-09-01 RX ORDER — POTASSIUM CHLORIDE 750 MG/1
10 CAPSULE, EXTENDED RELEASE ORAL
COMMUNITY
End: 2022-12-16

## 2022-09-01 RX ORDER — ASPIRIN 81 MG/1
81 TABLET ORAL
COMMUNITY
End: 2022-09-12

## 2022-09-01 RX ORDER — ONDANSETRON 4 MG/1
8 TABLET, FILM COATED ORAL EVERY 8 HOURS PRN
COMMUNITY
End: 2022-10-02

## 2022-09-01 RX ORDER — FLUTICASONE PROPIONATE AND SALMETEROL XINAFOATE 115; 21 UG/1; UG/1
2 AEROSOL, METERED RESPIRATORY (INHALATION)
Status: ON HOLD | COMMUNITY
End: 2022-09-14 | Stop reason: HOSPADM

## 2022-09-01 RX ORDER — CLOTRIMAZOLE AND BETAMETHASONE DIPROPIONATE 10; .64 MG/G; MG/G
CREAM TOPICAL
Qty: 15 G | Refills: 1 | Status: SHIPPED | OUTPATIENT
Start: 2022-09-01 | End: 2022-10-02

## 2022-09-01 RX ORDER — ATORVASTATIN CALCIUM 40 MG/1
40 TABLET, FILM COATED ORAL
COMMUNITY
End: 2022-09-01 | Stop reason: SDUPTHER

## 2022-09-01 RX ORDER — TORSEMIDE 20 MG/1
20 TABLET ORAL
COMMUNITY
End: 2022-09-01 | Stop reason: SDUPTHER

## 2022-09-01 NOTE — PROGRESS NOTES
Patient here today for annual visit, patient is in a wheelchair. Offered assistance to help her undress and get onto exam table, patient declined. I also offered to move patient to another room, with reclining bed, she declined. She stated she dresses herself everyday, shower on her own, she is able to undress and get onto the table alone. No further questions,

## 2022-09-01 NOTE — PROGRESS NOTES
CC: Annual  HPI: Pt is a 71 y.o.  female who presents for routine annual exam. New pt to me- last seen by GYN in 2017. She is postmenopausal. She is not on HRT. She is not SA. Reports urinary urge incontinence. Has been going on for a while, never can make it to the bathroom on time even though toilet is close to her bed. Interested in seeing someone about this. Used to wear daily pads for this but stopped. Intermittent vulvar itching. Denies abnormal discharge, vaginal itching, or foul odor. She has not tried any alleviating factors. In process of getting a lung biopsy for a mass. Lives alone, uses wheelchair due to oxygen but can walk. Uses public transportation as she does not drive. Two kids but they do not live her.    FH:   Breast cancer: none  Colon cancer: none  Ovarian cancer: none  Uterine cancer: none    HPV vaccine: na  Last pap smear:  nilm  History of abnormal pap smears: no    Colonoscopy: current, repeat in   DEXA: current  Mammogram: scheduled  STD history: no  Birth control:   OB history:   Tobacco use: yes    ROS:  GENERAL: Feeling well overall. Denies fever or chills.   SKIN: Denies rash or lesions.   HEAD: Denies head injury or headache.   NODES: Denies enlarged lymph nodes.   CHEST: Denies chest pain or shortness of breath.   CARDIOVASCULAR: Denies palpitations or left sided chest pain.   ABDOMEN: No abdominal pain, constipation, diarrhea, nausea, vomiting or rectal bleeding.   URINARY: No dysuria, hematuria, or burning on urination.  REPRODUCTIVE: See HPI.   BREASTS: Denies pain, lumps, or nipple discharge.   HEMATOLOGIC: No easy bruisability or excessive bleeding.   MUSCULOSKELETAL: Denies joint pain or swelling.   NEUROLOGIC: Denies syncope or weakness.   PSYCHIATRIC: Denies depression, anxiety or mood swings.    PE:   APPEARANCE: Well nourished, well developed, White female in no acute distress.  NODES: no cervical, supraclavicular, or inguinal lymphadenopathy  BREASTS:  Symmetrical, no skin changes or visible lesions. No palpable masses, nipple discharge or adenopathy bilaterally.  ABDOMEN: Soft. No tenderness or masses. No distention. No hernias palpated. No CVA tenderness.  VULVA: No lesions. Normal external female genitalia.  URETHRAL MEATUS: Normal size and location, no lesions, no prolapse.  URETHRA: No masses, tenderness, or prolapse.  VAGINA: ATROPHIC No lesions or lacerations noted. No abnormal discharge present. No odor present.   CERVIX: No lesions or discharge. No cervical motion tenderness.   UTERUS: Normal size, regular shape, mobile, non-tender.  ADNEXA: No tenderness. No fullness or masses palpated in the adnexal regions.   ANUS PERINEUM: Normal.      Diagnosis:  1. Well woman exam with routine gynecological exam    2. Vulvar itching    3. Urge incontinence of urine        Plan:     Orders Placed This Encounter    Ambulatory referral/consult to Urogynecology    clotrimazole-betamethasone 1-0.05% (LOTRISONE) cream     Mammogram scheduled  DXA current  Pap not indicated  Referral to urogyn   Rx Lotrisone    Patient was counseled today on the new ACS guidelines for cervical cytology screening as well as the current recommendations for breast cancer screening. She was counseled to follow up with her PCP for other routine health maintenance. Counseling session lasted approximately 15 minutes, and all her questions were answered.  For women over the age of 65, you can stop having cervical cancer screenings if you have never had abnormal cervical cells or cervical cancer, and youve had three negative Pap tests in a row. (You also can stop screening if youve had two negative Pap and HPV tests in a row in the past 10 years, with at least one test in the past 5 years.),    Follow-up with me in 1 year for routine exam    I spent a total of 20 minutes on the day of the visit.This includes face to face time and non-face to face time preparing to see the patient (eg, review of  tests), obtaining and/or reviewing separately obtained history, documenting clinical information in the electronic or other health record, independently interpreting results and communicating results to the patient/family/caregiver, or care coordinator.

## 2022-09-05 ENCOUNTER — TELEPHONE (OUTPATIENT)
Dept: INTERNAL MEDICINE | Facility: CLINIC | Age: 72
End: 2022-09-05
Payer: MEDICARE

## 2022-09-05 NOTE — TELEPHONE ENCOUNTER
Pt approved by [facility] for outpatient testing from 9-12-22 to 9-19-22; authorization # [id number].    Remarks: approve per Dr. Worthington/outpt/cpt 51641 left lung biopsy/[facility]GR[facility]/[id number] per Dr. Worthington as BENJAMIN for EVAL of:71 y.o. female a history of right lung adenocarcinoma. She presents today viatelemedicine visit to discuss a left lung biopsy. PET/CT on 7/1/22 shows a 1.1 cm JASSI pulmonary nodule with minimal tracer update. Given that it has been increasing in size, biopsy was recommended.    For outpatient and inpatient Admissions, the authorized number of visits/days include not only admission but other services provided to the member (I.e. Radiology, Pathology, Anesthesia, etc.)    [phone], fax [phone]

## 2022-09-06 ENCOUNTER — OFFICE VISIT (OUTPATIENT)
Dept: PSYCHIATRY | Facility: CLINIC | Age: 72
End: 2022-09-06
Payer: COMMERCIAL

## 2022-09-06 ENCOUNTER — TELEPHONE (OUTPATIENT)
Dept: INTERNAL MEDICINE | Facility: CLINIC | Age: 72
End: 2022-09-06
Payer: MEDICARE

## 2022-09-06 ENCOUNTER — SPECIALTY PHARMACY (OUTPATIENT)
Dept: PHARMACY | Facility: CLINIC | Age: 72
End: 2022-09-06
Payer: MEDICARE

## 2022-09-06 DIAGNOSIS — F43.10 PTSD (POST-TRAUMATIC STRESS DISORDER): ICD-10-CM

## 2022-09-06 DIAGNOSIS — E55.9 VITAMIN D DEFICIENCY: ICD-10-CM

## 2022-09-06 DIAGNOSIS — F33.2 MDD (MAJOR DEPRESSIVE DISORDER), RECURRENT SEVERE, WITHOUT PSYCHOSIS: Primary | ICD-10-CM

## 2022-09-06 DIAGNOSIS — F41.1 GENERALIZED ANXIETY DISORDER: ICD-10-CM

## 2022-09-06 PROCEDURE — 4010F ACE/ARB THERAPY RXD/TAKEN: CPT | Mod: CPTII,95,, | Performed by: PHYSICIAN ASSISTANT

## 2022-09-06 PROCEDURE — 3044F HG A1C LEVEL LT 7.0%: CPT | Mod: CPTII,95,, | Performed by: PHYSICIAN ASSISTANT

## 2022-09-06 PROCEDURE — 3044F PR MOST RECENT HEMOGLOBIN A1C LEVEL <7.0%: ICD-10-PCS | Mod: CPTII,95,, | Performed by: PHYSICIAN ASSISTANT

## 2022-09-06 PROCEDURE — 99214 PR OFFICE/OUTPT VISIT, EST, LEVL IV, 30-39 MIN: ICD-10-PCS | Mod: 95,,, | Performed by: PHYSICIAN ASSISTANT

## 2022-09-06 PROCEDURE — 4010F PR ACE/ARB THEARPY RXD/TAKEN: ICD-10-PCS | Mod: CPTII,95,, | Performed by: PHYSICIAN ASSISTANT

## 2022-09-06 PROCEDURE — 99214 OFFICE O/P EST MOD 30 MIN: CPT | Mod: 95,,, | Performed by: PHYSICIAN ASSISTANT

## 2022-09-06 RX ORDER — TRAZODONE HYDROCHLORIDE 100 MG/1
100 TABLET ORAL NIGHTLY
Qty: 30 TABLET | Refills: 2 | Status: SHIPPED | OUTPATIENT
Start: 2022-09-06 | End: 2023-01-02 | Stop reason: SDUPTHER

## 2022-09-06 NOTE — PROGRESS NOTES
"The patient location is: [place], [place]  The chief complaint leading to consultation is: depression f/u    Visit type: audiovisual    time with patient: 23  31 minutes of total time spent on the encounter, which includes face to face time and non-face to face time preparing to see the patient (eg, review of tests), Obtaining and/or reviewing separately obtained history, Documenting clinical information in the electronic or other health record, Independently interpreting results (not separately reported) and communicating results to the patient/family/caregiver, or Care coordination (not separately reported).         Each patient to whom he or she provides medical services by telemedicine is:  (1) informed of the relationship between the physician and patient and the respective role of any other health care provider with respect to management of the patient; and (2) notified that he or she may decline to receive medical services by telemedicine and may withdraw from such care at any time.    Notes:       Outpatient Psychiatry Follow-Up Visit (MD/SANDRA)    9/6/2022    Clinical Status of Patient:  Outpatient (Ambulatory)    Chief Complaint:  Nalini Varma is a 71 y.o. female who presents today for follow-up of depression, anxiety and adjustment problems.  Met with patient.      Interval History and Content of Current Session:  Interim Events/Subjective Report/Content of Current Session:    Pt reports today: "have biopsy next week for spot in my lungs. Just ready to know and im anxious about it"    Patients mood is depressive and anxious, affect appears mood congruent. Linear and logical, friendly and cooperative, good eye contact.    Denies SI/HI/AVH. Pt reports sleeping poorly on remeron-states trazodone was more effective and normal appetite. Denies side effects of medications.    Pt reports taking medications as prescribed and behaving appropriately during interview today.    Pt discussed wanting letter for " "emotional support animal so that she have one as she is only allowed to have one at facility she lives at if there is a letter from medical provider      Prior visit: 8/5/22:  Pt reports today: "might have cancer. Lung doctors are worried that spot lungs got into my lymph nodes"    Pt reports mood and anxiety had been improved on current regiment until 1 week ago she found out that she likely has lung ca again.    Patients mood is anxious and sad. Linear and logical, friendly and cooperative.    Denies SI/HI/AVH. Pt reports sleeping only 3-4 hr per night, trazodone ineffective and normal appetite. Denies side effects of medications.    Pt reports taking medications as prescribed.        Review of Systems       Psychiatric Review Of Systems - Is patient experiencing or having changes in:  sleep: yes  appetite: no  weight: no  energy/anergy: yes  interest/pleasure/anhedonia: yes  somatic symptoms: no  libido: no  anxiety/panic: yes  guilty/hopelessness: yes  concentration: no  S.I.B.s/risky behavior: no  Irritability: yes  Racing thoughts: yes  Impulsive behaviors: no  Paranoia: no  AVH: no      Past Medical, Family and Social History: The patient's past medical, family and social history have been reviewed and updated as appropriate within the electronic medical record - see encounter notes.      Current Medications:   Medication List with Changes/Refills   New Medications    TRAZODONE (DESYREL) 100 MG TABLET    Take 1 tablet (100 mg total) by mouth every evening.   Current Medications    ALBUTEROL INHL    Inhale into the lungs.    ASPIRIN (ECOTRIN) 81 MG EC TABLET    Take 81 mg by mouth.    ATORVASTATIN (LIPITOR) 40 MG TABLET    TAKE 1 TABLET BY MOUTH EVERY DAILY    AZELASTINE (ASTELIN) 137 MCG (0.1 %) NASAL SPRAY    1 spray (137 mcg total) by Nasal route 2 (two) times daily.    B COMPLEX VITAMINS CAPSULE    Take 1 capsule by mouth once daily.    CALCIUM CARBONATE (OS-SANDRINE) 500 MG CALCIUM (1,250 MG) TABLET    Take 2 " tablets (1,000 mg total) by mouth 2 (two) times daily.    CLOTRIMAZOLE-BETAMETHASONE 1-0.05% (LOTRISONE) CREAM    Apply to affected area 2 times daily    CYANOCOBALAMIN, VITAMIN B-12, 1,000 MCG TBSR    Take 1,000 mcg by mouth once daily.    DENOSUMAB (PROLIA) 60 MG/ML SYRG    Inject 1 mL (60 mg total) into the skin every 6 (six) months.    DULOXETINE (CYMBALTA) 60 MG CAPSULE    Take 1 capsule (60 mg total) by mouth once daily.    FLUTICASONE (FLONASE) 50 MCG/ACTUATION NASAL SPRAY    1 spray by Each Nare route 2 (two) times daily as needed for Rhinitis.    FLUTICASONE PROPION-SALMETEROL 115-21 MCG/DOSE (ADVAIR HFA) 115-21 MCG/ACTUATION HFAA INHALER    Inhale 2 puffs into the lungs every 12 (twelve) hours.    FLUTICASONE PROPION-SALMETEROL 115-21 MCG/DOSE (ADVAIR HFA) 115-21 MCG/ACTUATION HFAA INHALER    Inhale 2 puffs into the lungs.    GABAPENTIN (NEURONTIN) 300 MG CAPSULE    Take 1 capsule (300 mg total) by mouth 3 (three) times daily.    GUAIFENESIN (MUCINEX) 600 MG 12 HR TABLET    Take 1,200 mg by mouth 2 (two) times daily as needed for Congestion.    HYDROCODONE-ACETAMINOPHEN (NORCO)  MG PER TABLET    Take 1 tablet by mouth every 12 (twelve) hours as needed for Pain.    HYDROCODONE-ACETAMINOPHEN (NORCO)  MG PER TABLET    Take 1 tablet by mouth.    LOSARTAN (COZAAR) 50 MG TABLET    Take 2 tablets (100 mg total) by mouth once daily.    MAGNESIUM OXIDE (MAG-OX) 400 MG (241.3 MG MAGNESIUM) TABLET    Take 1 tablet by mouth every 12 (twelve) hours.    METOPROLOL SUCCINATE (TOPROL-XL) 25 MG 24 HR TABLET    Take 2 tablets (50 mg total) by mouth once daily.    METOPROLOL TARTRATE (LOPRESSOR) 50 MG TABLET    Take 50 mg by mouth.    MULTIVIT-MIN/IRON/FOLIC/LUTEIN (CENTRUM SILVER WOMEN ORAL)    Take 1 tablet by mouth once daily.    ONDANSETRON (ZOFRAN) 4 MG TABLET    Take 8 mg by mouth every 8 (eight) hours as needed.    PANTOPRAZOLE (PROTONIX) 40 MG TABLET    Take 1 tablet (40 mg total) by mouth once daily.     POTASSIUM CHLORIDE (MICRO-K) 10 MEQ CPSR    Take 10 mEq by mouth.    SARS-COV-2, COVID-19, (MODERNA COVID-19) 50 MCG/0.25 ML INJECTION (BOOSTER)    Inject into the muscle.    TORSEMIDE (DEMADEX) 20 MG TAB    Take 1 tablet (20 mg total) by mouth once daily.    TRAZODONE (DESYREL) 50 MG TABLET    Take 1 tablet (50 mg total) by mouth nightly as needed for Insomnia.    UMECLIDINIUM (INCRUSE ELLIPTA) 62.5 MCG/ACTUATION INHALATION CAPSULE    Inhale 1 puff into the lungs once daily. Controller    UMECLIDINIUM BROMIDE (INCRUSE ELLIPTA INHL)    Inhale into the lungs.    VITAMIN D (VITAMIN D3) 1000 UNITS TAB    Take 1 tablet (1,000 Units total) by mouth once daily.    ZINC ORAL    Take by mouth.   Discontinued Medications    MIRTAZAPINE (REMERON) 7.5 MG TAB    Take 1 tablet (7.5 mg total) by mouth every evening.         Allergies:   Review of patient's allergies indicates:   Allergen Reactions    Wellbutrin [bupropion hcl] Other (See Comments)     Hyponatremia and hypomagnesia     Zoloft [sertraline] Other (See Comments)     Hyponatremia and hypomagnesia     Bananas [banana]      Emesis, and stomach cramps         Vitals   There were no vitals filed for this visit.       Labs/Imaging/Studies:   No results found for this or any previous visit (from the past 48 hour(s)).   No results found for: PHENYTOIN, PHENOBARB, VALPROATE, CBMZ    Compliance: yes    Side effects: None    Risk Parameters:  Patient reports no suicidal ideation  Patient reports no homicidal ideation  Patient reports no self-injurious behavior  Patient reports no violent behavior    Exam (detailed: at least 9 elements; comprehensive: all 15 elements)   Constitutional  Vitals:  Most recent vital signs, dated less than 90 days prior to this appointment, were reviewed.   There were no vitals filed for this visit.     General:  unremarkable, age appropriate     Musculoskeletal  Muscle Strength/Tone:  not examined   Gait & Station:  Not examined      Psychiatric  Speech:  no latency; no press   Mood & Affect:  anxious, depressed   Thought Process:  normal and logical   Associations:  intact   Thought Content:  normal, no suicidality, no homicidality, delusions, or paranoia   Insight:  intact, has awareness of illness   Judgement: behavior is adequate to circumstances   Orientation:  grossly intact   Memory: intact for content of interview   Language: grossly intact   Attention Span & Concentration:  able to focus   Fund of Knowledge:  intact and appropriate to age and level of education     Assessment and Diagnosis   Status/Progress: Based on the examination today, the patient's problem(s) is/are inadequately controlled.  New problems have been presented today.   Co-morbidities are complicating management of the primary condition.  There are no active rule-out diagnoses for this patient at this time.     General Impression:      ICD-10-CM ICD-9-CM   1. MDD (major depressive disorder), recurrent severe, without psychosis  F33.2 296.33   2. PTSD (post-traumatic stress disorder)  F43.10 309.81   3. Generalized anxiety disorder  F41.1 300.02   4. Vitamin D deficiency  E55.9 268.9         Intervention/Counseling/Treatment Plan   Medication Management: Continue current medications. The risks and benefits of medication were discussed with the patient.    -cymbalta to 60mg daily  -restart trazodone at 100mg qhs    -stop remeron    -continue current vitamin D supplement    Return to Clinic: 6 weeks        Leonardo Kolb PA-C      Total face to face time: 18 min  Total time (chart review, patient contact, documentation): 26 min      *This note has been prepared using a combination of a dictation device and typing.  It has been checked for errors but some errors may still exist within the note as a result of speech recognition errors and/or typographical errors.

## 2022-09-06 NOTE — TELEPHONE ENCOUNTER
Specialty Pharmacy - Refill Coordination    Specialty Medication Orders Linked to Encounter      Flowsheet Row Most Recent Value   Medication #1 denosumab (PROLIA) 60 mg/mL Syrg (Order#[id number], Rx#[id number])            Refill Questions - Documented Responses      Flowsheet Row Most Recent Value   Patient Availability and HIPAA Verification    Does patient want to proceed with activity? Yes   HIPAA/medical authority confirmed? Yes   Relationship to patient of person spoken to? Self   Refill Screening Questions    Changes to allergies? No   Changes to medications? No   New conditions since last clinic visit? No   Unplanned office visit, urgent care, ED, or hospital admission in the last 4 weeks? No   How does patient/caregiver feel medication is working? Good   Financial problems or insurance changes? No   How many doses of your specialty medications were missed in the last 4 weeks? 0   Would patient like to speak to a pharmacist? No   When does the patient need to receive the medication? 09/16/22   Refill Delivery Questions    How will the patient receive the medication?    When does the patient need to receive the medication? 09/16/22   Shipping Address Prescription   Address in [facility] confirmed and updated if neccessary? Yes   Expected Copay ($) 0   Is the patient able to afford the medication copay? Yes   Payment Method zero copay   Days supply of Refill 180   Supplies needed? No supplies needed   Refill activity completed? Yes   Refill activity plan Refill scheduled   Shipment/Pickup Date: 09/16/22            Current Outpatient Medications   Medication Sig    ALBUTEROL INHL Inhale into the lungs.    aspirin (ECOTRIN) 81 MG EC tablet Take 81 mg by mouth.    atorvastatin (LIPITOR) 40 MG tablet TAKE 1 TABLET BY MOUTH EVERY DAILY    azelastine (ASTELIN) 137 mcg (0.1 %) nasal spray 1 spray (137 mcg total) by Nasal route 2 (two) times daily.    b complex vitamins capsule Take 1 capsule by mouth  once daily.    calcium carbonate (OS-SANDRINE) 500 mg calcium (1,250 mg) tablet Take 2 tablets (1,000 mg total) by mouth 2 (two) times daily.    clotrimazole-betamethasone 1-0.05% (LOTRISONE) cream Apply to affected area 2 times daily    cyanocobalamin, vitamin B-12, 1,000 mcg TbSR Take 1,000 mcg by mouth once daily.    denosumab (PROLIA) 60 mg/mL Syrg Inject 1 mL (60 mg total) into the skin every 6 (six) months.    DULoxetine (CYMBALTA) 60 MG capsule Take 1 capsule (60 mg total) by mouth once daily.    fluticasone (FLONASE) 50 mcg/actuation nasal spray 1 spray by Each Nare route 2 (two) times daily as needed for Rhinitis.    fluticasone propion-salmeterol 115-21 mcg/dose (ADVAIR HFA) 115-21 mcg/actuation HFAA inhaler Inhale 2 puffs into the lungs every 12 (twelve) hours.    fluticasone propion-salmeterol 115-21 mcg/dose (ADVAIR HFA) 115-21 mcg/actuation HFAA inhaler Inhale 2 puffs into the lungs.    gabapentin (NEURONTIN) 300 MG capsule Take 1 capsule (300 mg total) by mouth 3 (three) times daily.    guaiFENesin (MUCINEX) 600 mg 12 hr tablet Take 1,200 mg by mouth 2 (two) times daily as needed for Congestion.    HYDROcodone-acetaminophen (NORCO)  mg per tablet Take 1 tablet by mouth every 12 (twelve) hours as needed for Pain.    HYDROcodone-acetaminophen (NORCO)  mg per tablet Take 1 tablet by mouth.    losartan (COZAAR) 50 MG tablet Take 2 tablets (100 mg total) by mouth once daily.    magnesium oxide (MAG-OX) 400 mg (241.3 mg magnesium) tablet Take 1 tablet by mouth every 12 (twelve) hours.    metoprolol succinate (TOPROL-XL) 25 MG 24 hr tablet Take 2 tablets (50 mg total) by mouth once daily.    metoprolol tartrate (LOPRESSOR) 50 MG tablet Take 50 mg by mouth.    mirtazapine (REMERON) 7.5 MG Tab Take 1 tablet (7.5 mg total) by mouth every evening.    multivit-min/iron/folic/lutein (CENTRUM SILVER WOMEN ORAL) Take 1 tablet by mouth once daily.    ondansetron (ZOFRAN) 4 MG tablet Take 8 mg by mouth every 8  (eight) hours as needed.    pantoprazole (PROTONIX) 40 MG tablet Take 1 tablet (40 mg total) by mouth once daily.    potassium chloride (MICRO-K) 10 MEQ CpSR Take 10 mEq by mouth.    sars-cov-2, covid-19, (MODERNA COVID-19) 50 mcg/0.25 ml injection (BOOSTER) Inject into the muscle.    torsemide (DEMADEX) 20 MG Tab Take 1 tablet (20 mg total) by mouth once daily.    traZODone (DESYREL) 50 MG tablet Take 1 tablet (50 mg total) by mouth nightly as needed for Insomnia.    umeclidinium (INCRUSE ELLIPTA) 62.5 mcg/actuation inhalation capsule Inhale 1 puff into the lungs once daily. Controller    umeclidinium bromide (INCRUSE ELLIPTA INHL) Inhale into the lungs.    vitamin D (VITAMIN D3) 1000 units Tab Take 1 tablet (1,000 Units total) by mouth once daily.    ZINC ORAL Take by mouth.   Last reviewed on 9/1/2022  9:59 AM by Leanne Mead MA    Review of patient's allergies indicates:   Allergen Reactions    Wellbutrin [bupropion hcl] Other (See Comments)     Hyponatremia and hypomagnesia     Zoloft [sertraline] Other (See Comments)     Hyponatremia and hypomagnesia     Bananas [banana]      Emesis, and stomach cramps    Last reviewed on  9/1/2022 9:59 AM by Leanne Mead      Tasks added this encounter   2/19/2023 - Refill Call (Auto Added)  9/6/2022 -  Setup Confirmation   Tasks due within next 3 months   No tasks due.     [facility]  [address] [place] [id number]  Phone: [phone]  Fax: [phone]

## 2022-09-06 NOTE — TELEPHONE ENCOUNTER
[facility] authorization approval for right lung adenocarcinoma  uploaded to

## 2022-09-08 RX ORDER — FENTANYL CITRATE 50 UG/ML
50 INJECTION, SOLUTION INTRAMUSCULAR; INTRAVENOUS
Status: CANCELLED | OUTPATIENT
Start: 2022-09-08

## 2022-09-08 RX ORDER — MIDAZOLAM HYDROCHLORIDE 1 MG/ML
1 INJECTION INTRAMUSCULAR; INTRAVENOUS
Status: CANCELLED | OUTPATIENT
Start: 2022-09-08

## 2022-09-09 ENCOUNTER — TELEPHONE (OUTPATIENT)
Dept: INTERVENTIONAL RADIOLOGY/VASCULAR | Facility: HOSPITAL | Age: 72
End: 2022-09-09
Payer: MEDICARE

## 2022-09-12 ENCOUNTER — HOSPITAL ENCOUNTER (OUTPATIENT)
Dept: RADIOLOGY | Facility: HOSPITAL | Age: 72
Discharge: HOME | DRG: 200 | End: 2022-09-12
Attending: STUDENT IN AN ORGANIZED HEALTH CARE EDUCATION/TRAINING PROGRAM
Payer: MEDICARE

## 2022-09-12 ENCOUNTER — HOSPITAL ENCOUNTER (OUTPATIENT)
Dept: INTERVENTIONAL RADIOLOGY/VASCULAR | Facility: HOSPITAL | Age: 72
Discharge: HOME | DRG: 200 | End: 2022-09-12
Attending: STUDENT IN AN ORGANIZED HEALTH CARE EDUCATION/TRAINING PROGRAM
Payer: MEDICARE

## 2022-09-12 ENCOUNTER — HOSPITAL ENCOUNTER (OUTPATIENT)
Dept: INTERVENTIONAL RADIOLOGY/VASCULAR | Facility: HOSPITAL | Age: 72
Discharge: HOME | DRG: 200 | End: 2022-09-12
Attending: FAMILY MEDICINE
Payer: MEDICARE

## 2022-09-12 ENCOUNTER — HOSPITAL ENCOUNTER (INPATIENT)
Facility: HOSPITAL | Age: 72
LOS: 2 days | Discharge: HOME | DRG: 200 | End: 2022-09-14
Attending: STUDENT IN AN ORGANIZED HEALTH CARE EDUCATION/TRAINING PROGRAM | Admitting: STUDENT IN AN ORGANIZED HEALTH CARE EDUCATION/TRAINING PROGRAM
Payer: MEDICARE

## 2022-09-12 VITALS
TEMPERATURE: 97 F | RESPIRATION RATE: 17 BRPM | OXYGEN SATURATION: 100 % | SYSTOLIC BLOOD PRESSURE: 178 MMHG | DIASTOLIC BLOOD PRESSURE: 76 MMHG | HEART RATE: 79 BPM

## 2022-09-12 DIAGNOSIS — R53.81 DEBILITY: Primary | ICD-10-CM

## 2022-09-12 DIAGNOSIS — Z85.118 HISTORY OF LUNG CANCER: ICD-10-CM

## 2022-09-12 DIAGNOSIS — J96.11 CHRONIC RESPIRATORY FAILURE WITH HYPOXIA: ICD-10-CM

## 2022-09-12 DIAGNOSIS — R07.9 CHEST PAIN: ICD-10-CM

## 2022-09-12 DIAGNOSIS — J96.11 CHRONIC HYPOXEMIC RESPIRATORY FAILURE: ICD-10-CM

## 2022-09-12 DIAGNOSIS — R91.1 PULMONARY NODULE: ICD-10-CM

## 2022-09-12 DIAGNOSIS — D49.9 NEOPLASM: Primary | ICD-10-CM

## 2022-09-12 DIAGNOSIS — Z98.890 STATUS POST BIOPSY: ICD-10-CM

## 2022-09-12 DIAGNOSIS — J95.811 PNEUMOTHORAX AFTER BIOPSY: ICD-10-CM

## 2022-09-12 PROCEDURE — 32408 IR PLEURAL DRAINAGE WITH TUBE PLACEMENT: ICD-10-PCS | Mod: 51,,, | Performed by: STUDENT IN AN ORGANIZED HEALTH CARE EDUCATION/TRAINING PROGRAM

## 2022-09-12 PROCEDURE — 88333 PATH CONSLTJ SURG CYTO XM 1: CPT | Mod: 26,,, | Performed by: PATHOLOGY

## 2022-09-12 PROCEDURE — 32408 CORE NDL BX LNG/MED PERQ: CPT

## 2022-09-12 PROCEDURE — 63600175 PHARM REV CODE 636 W HCPCS: Performed by: STUDENT IN AN ORGANIZED HEALTH CARE EDUCATION/TRAINING PROGRAM

## 2022-09-12 PROCEDURE — 88305 TISSUE EXAM BY PATHOLOGIST: CPT | Mod: 26,,, | Performed by: PATHOLOGY

## 2022-09-12 PROCEDURE — 11000001 HC ACUTE MED/SURG PRIVATE ROOM

## 2022-09-12 PROCEDURE — 71045 X-RAY EXAM CHEST 1 VIEW: CPT | Mod: TC

## 2022-09-12 PROCEDURE — 32557 INSERT CATH PLEURA W/ IMAGE: CPT | Performed by: STUDENT IN AN ORGANIZED HEALTH CARE EDUCATION/TRAINING PROGRAM

## 2022-09-12 PROCEDURE — 71045 XR CHEST AP PORTABLE: ICD-10-PCS | Mod: 26,,, | Performed by: STUDENT IN AN ORGANIZED HEALTH CARE EDUCATION/TRAINING PROGRAM

## 2022-09-12 PROCEDURE — 32557 IR PLEURAL DRAINAGE WITH TUBE PLACEMENT: ICD-10-PCS | Mod: ,,, | Performed by: STUDENT IN AN ORGANIZED HEALTH CARE EDUCATION/TRAINING PROGRAM

## 2022-09-12 PROCEDURE — 32408 CORE NDL BX LNG/MED PERQ: CPT | Performed by: STUDENT IN AN ORGANIZED HEALTH CARE EDUCATION/TRAINING PROGRAM

## 2022-09-12 PROCEDURE — 71045 XR CHEST AP PORTABLE: ICD-10-PCS | Mod: 26,76,, | Performed by: RADIOLOGY

## 2022-09-12 PROCEDURE — 99152 MOD SED SAME PHYS/QHP 5/>YRS: CPT | Mod: ,,, | Performed by: STUDENT IN AN ORGANIZED HEALTH CARE EDUCATION/TRAINING PROGRAM

## 2022-09-12 PROCEDURE — 88333 PR  INTRAOPERATIVE CYTO PATH CONSULT, INITIAL SITE: ICD-10-PCS | Mod: 26,,, | Performed by: PATHOLOGY

## 2022-09-12 PROCEDURE — 88305 TISSUE EXAM BY PATHOLOGIST: ICD-10-PCS | Mod: 26,,, | Performed by: PATHOLOGY

## 2022-09-12 PROCEDURE — 99152 PR MOD CONSCIOUS SEDATION, SAME PHYS, 5+ YRS, FIRST 15 MIN: ICD-10-PCS | Mod: ,,, | Performed by: STUDENT IN AN ORGANIZED HEALTH CARE EDUCATION/TRAINING PROGRAM

## 2022-09-12 PROCEDURE — 25000003 PHARM REV CODE 250: Performed by: STUDENT IN AN ORGANIZED HEALTH CARE EDUCATION/TRAINING PROGRAM

## 2022-09-12 PROCEDURE — 71045 X-RAY EXAM CHEST 1 VIEW: CPT | Mod: 26,,, | Performed by: STUDENT IN AN ORGANIZED HEALTH CARE EDUCATION/TRAINING PROGRAM

## 2022-09-12 PROCEDURE — 27201068 IR BIOPSY LUNG W/ GUIDANCE

## 2022-09-12 PROCEDURE — 32408 CORE NDL BX LNG/MED PERQ: CPT | Mod: 51,,, | Performed by: STUDENT IN AN ORGANIZED HEALTH CARE EDUCATION/TRAINING PROGRAM

## 2022-09-12 PROCEDURE — 88305 TISSUE EXAM BY PATHOLOGIST: CPT | Performed by: PATHOLOGY

## 2022-09-12 PROCEDURE — 27100111 IR PLEURAL DRAINAGE WITH TUBE PLACEMENT

## 2022-09-12 PROCEDURE — 88333 PATH CONSLTJ SURG CYTO XM 1: CPT | Performed by: PATHOLOGY

## 2022-09-12 PROCEDURE — 71045 X-RAY EXAM CHEST 1 VIEW: CPT | Mod: 26,76,, | Performed by: RADIOLOGY

## 2022-09-12 RX ORDER — FENTANYL CITRATE 50 UG/ML
INJECTION, SOLUTION INTRAMUSCULAR; INTRAVENOUS
Status: DISCONTINUED | OUTPATIENT
Start: 2022-09-12 | End: 2022-09-13 | Stop reason: HOSPADM

## 2022-09-12 RX ORDER — OXYCODONE AND ACETAMINOPHEN 5; 325 MG/1; MG/1
1 TABLET ORAL ONCE
Status: COMPLETED | OUTPATIENT
Start: 2022-09-12 | End: 2022-09-12

## 2022-09-12 RX ORDER — GLUCAGON 1 MG
1 KIT INJECTION
Status: DISCONTINUED | OUTPATIENT
Start: 2022-09-12 | End: 2022-09-14 | Stop reason: HOSPADM

## 2022-09-12 RX ORDER — NALOXONE HCL 0.4 MG/ML
0.02 VIAL (ML) INJECTION
Status: DISCONTINUED | OUTPATIENT
Start: 2022-09-12 | End: 2022-09-14 | Stop reason: HOSPADM

## 2022-09-12 RX ORDER — PANTOPRAZOLE SODIUM 40 MG/1
40 TABLET, DELAYED RELEASE ORAL DAILY
Status: DISCONTINUED | OUTPATIENT
Start: 2022-09-13 | End: 2022-09-14 | Stop reason: HOSPADM

## 2022-09-12 RX ORDER — HYDROCODONE BITARTRATE AND ACETAMINOPHEN 10; 325 MG/1; MG/1
1 TABLET ORAL EVERY 12 HOURS PRN
Status: DISCONTINUED | OUTPATIENT
Start: 2022-09-12 | End: 2022-09-13

## 2022-09-12 RX ORDER — ONDANSETRON 2 MG/ML
4 INJECTION INTRAMUSCULAR; INTRAVENOUS EVERY 8 HOURS PRN
Status: DISCONTINUED | OUTPATIENT
Start: 2022-09-12 | End: 2022-09-14 | Stop reason: HOSPADM

## 2022-09-12 RX ORDER — IBUPROFEN 200 MG
16 TABLET ORAL
Status: DISCONTINUED | OUTPATIENT
Start: 2022-09-12 | End: 2022-09-14 | Stop reason: HOSPADM

## 2022-09-12 RX ORDER — FLUTICASONE FUROATE AND VILANTEROL 100; 25 UG/1; UG/1
1 POWDER RESPIRATORY (INHALATION) DAILY
Refills: 3 | Status: DISCONTINUED | OUTPATIENT
Start: 2022-09-13 | End: 2022-09-14 | Stop reason: HOSPADM

## 2022-09-12 RX ORDER — LIDOCAINE HYDROCHLORIDE 10 MG/ML
INJECTION INFILTRATION; PERINEURAL
Status: DISCONTINUED | OUTPATIENT
Start: 2022-09-12 | End: 2022-09-13 | Stop reason: HOSPADM

## 2022-09-12 RX ORDER — GABAPENTIN 300 MG/1
300 CAPSULE ORAL 3 TIMES DAILY
Status: DISCONTINUED | OUTPATIENT
Start: 2022-09-12 | End: 2022-09-14 | Stop reason: HOSPADM

## 2022-09-12 RX ORDER — LOSARTAN POTASSIUM 50 MG/1
100 TABLET ORAL DAILY
Status: DISCONTINUED | OUTPATIENT
Start: 2022-09-13 | End: 2022-09-14 | Stop reason: HOSPADM

## 2022-09-12 RX ORDER — LIDOCAINE HYDROCHLORIDE 10 MG/ML
1 INJECTION, SOLUTION EPIDURAL; INFILTRATION; INTRACAUDAL; PERINEURAL ONCE
Status: DISCONTINUED | OUTPATIENT
Start: 2022-09-12 | End: 2022-09-13 | Stop reason: HOSPADM

## 2022-09-12 RX ORDER — ENOXAPARIN SODIUM 100 MG/ML
40 INJECTION SUBCUTANEOUS EVERY 24 HOURS
Status: DISCONTINUED | OUTPATIENT
Start: 2022-09-13 | End: 2022-09-14 | Stop reason: HOSPADM

## 2022-09-12 RX ORDER — IBUPROFEN 200 MG
24 TABLET ORAL
Status: DISCONTINUED | OUTPATIENT
Start: 2022-09-12 | End: 2022-09-14 | Stop reason: HOSPADM

## 2022-09-12 RX ORDER — MIDAZOLAM HYDROCHLORIDE 1 MG/ML
INJECTION INTRAMUSCULAR; INTRAVENOUS
Status: DISCONTINUED | OUTPATIENT
Start: 2022-09-12 | End: 2022-09-13 | Stop reason: HOSPADM

## 2022-09-12 RX ORDER — OXYCODONE AND ACETAMINOPHEN 5; 325 MG/1; MG/1
TABLET ORAL
Status: DISCONTINUED
Start: 2022-09-12 | End: 2022-09-13 | Stop reason: HOSPADM

## 2022-09-12 RX ORDER — METOPROLOL SUCCINATE 50 MG/1
50 TABLET, EXTENDED RELEASE ORAL DAILY
Status: DISCONTINUED | OUTPATIENT
Start: 2022-09-13 | End: 2022-09-14 | Stop reason: HOSPADM

## 2022-09-12 RX ORDER — SODIUM CHLORIDE 9 MG/ML
INJECTION, SOLUTION INTRAVENOUS CONTINUOUS
Status: DISCONTINUED | OUTPATIENT
Start: 2022-09-12 | End: 2022-09-13 | Stop reason: HOSPADM

## 2022-09-12 RX ORDER — SODIUM CHLORIDE 0.9 % (FLUSH) 0.9 %
10 SYRINGE (ML) INJECTION EVERY 12 HOURS PRN
Status: DISCONTINUED | OUTPATIENT
Start: 2022-09-12 | End: 2022-09-14 | Stop reason: HOSPADM

## 2022-09-12 RX ORDER — HYDRALAZINE HYDROCHLORIDE 20 MG/ML
INJECTION INTRAMUSCULAR; INTRAVENOUS
Status: DISCONTINUED | OUTPATIENT
Start: 2022-09-12 | End: 2022-09-13 | Stop reason: HOSPADM

## 2022-09-12 RX ORDER — TORSEMIDE 20 MG/1
20 TABLET ORAL DAILY
Status: DISCONTINUED | OUTPATIENT
Start: 2022-09-13 | End: 2022-09-14 | Stop reason: HOSPADM

## 2022-09-12 RX ORDER — DULOXETIN HYDROCHLORIDE 60 MG/1
60 CAPSULE, DELAYED RELEASE ORAL DAILY
Status: DISCONTINUED | OUTPATIENT
Start: 2022-09-13 | End: 2022-09-14 | Stop reason: HOSPADM

## 2022-09-12 RX ORDER — ATORVASTATIN CALCIUM 40 MG/1
40 TABLET, FILM COATED ORAL NIGHTLY
Status: DISCONTINUED | OUTPATIENT
Start: 2022-09-13 | End: 2022-09-14 | Stop reason: HOSPADM

## 2022-09-12 RX ORDER — TRAZODONE HYDROCHLORIDE 50 MG/1
50 TABLET ORAL NIGHTLY PRN
Status: DISCONTINUED | OUTPATIENT
Start: 2022-09-12 | End: 2022-09-14 | Stop reason: HOSPADM

## 2022-09-12 RX ADMIN — LIDOCAINE HYDROCHLORIDE 3 ML: 10 INJECTION, SOLUTION INFILTRATION; PERINEURAL at 12:09

## 2022-09-12 RX ADMIN — FENTANYL CITRATE 25 MCG: 50 INJECTION, SOLUTION INTRAMUSCULAR; INTRAVENOUS at 12:09

## 2022-09-12 RX ADMIN — OXYCODONE HYDROCHLORIDE AND ACETAMINOPHEN 1 TABLET: 5; 325 TABLET ORAL at 05:09

## 2022-09-12 RX ADMIN — FENTANYL CITRATE 50 MCG: 50 INJECTION, SOLUTION INTRAMUSCULAR; INTRAVENOUS at 05:09

## 2022-09-12 RX ADMIN — MIDAZOLAM HYDROCHLORIDE 0.5 MG: 1 INJECTION INTRAMUSCULAR; INTRAVENOUS at 12:09

## 2022-09-12 RX ADMIN — GABAPENTIN 300 MG: 300 CAPSULE ORAL at 09:09

## 2022-09-12 RX ADMIN — HYDROCODONE BITARTRATE AND ACETAMINOPHEN 1 TABLET: 10; 325 TABLET ORAL at 09:09

## 2022-09-12 RX ADMIN — HYDRALAZINE HYDROCHLORIDE 10 MG: 20 INJECTION INTRAMUSCULAR; INTRAVENOUS at 12:09

## 2022-09-12 RX ADMIN — TRAZODONE HYDROCHLORIDE 50 MG: 50 TABLET ORAL at 09:09

## 2022-09-12 NOTE — BRIEF OP NOTE
Radiology Post-Procedure Note    Pre Op Diagnosis: lung nodule    Post Op Diagnosis: same    Procedure: left upper lung nodule biopsy     Procedure performed by: Dominique De La Torre MD    Written Informed Consent Obtained: Yes    Specimen Removed: YES 2    Estimated Blood Loss: less than 50     Findings:   Using CT guidance a 19 g sheath needle was placed into the left upper lobe. 20g biopsy device used to take 2 core biopsy samples were taken, one being predominately  blood products. Repeat scan demonstrated a small amount of hemorrhage and the visualization in the field was decreased. .  Specimen sent to pathology.     Patient tolerated procedure well.    Dominique De La Torre MD  Interventional Radiology

## 2022-09-12 NOTE — DISCHARGE INSTRUCTIONS
For scheduling: Call at [phone]    For questions or concerns call: ALICJA MON-FRI 8 AM- 5PM [phone]. Radiology resident on call [phone].    For immediate concerns that are not emergent, you may call our radiology clinic at: [phone]

## 2022-09-12 NOTE — CARE UPDATE
Patient fully recovered from procedure and report called to [facility]. Will transport patient to floor once room is clean and ready.

## 2022-09-12 NOTE — PLAN OF CARE
RN notified Dr De La Torre pt's /93. Per Dr. De La Torre, verbal order IV hydralazine 10mg. Will administer and monitor BP.

## 2022-09-12 NOTE — H&P
Radiology History & Physical      SUBJECTIVE:     Chief Complaint: lung nodule     History of Present Illness:  Nalini Varma is a 71 y.o. female with 1.1 CM  left upper lobe nodule who presents for IR biopsy of lung nodule.     Past Medical History:   Diagnosis Date    Anxiety     Cervical spinal stenosis     Choledocholithiasis     Chronic obstructive pulmonary disease 03/16/2016    Degenerative disc disease of cervical spine     Diverticulosis 04/24/2018    History of alcohol abuse 03/02/2021    History of gastric ulcer     History of L3 compression fracture 12/19/2018    History of lung cancer     s/p completion of XRT in 10/2020    Hyperlipidemia     Iron deficiency anemia     Osteoarthritis     Stage III CKD 2017     Past Surgical History:   Procedure Laterality Date    BREAST CYST EXCISION Right     1967    CATARACT EXTRACTION      COLONOSCOPY  2015    COLONOSCOPY N/A 6/8/2022    Procedure: COLONOSCOPY;  Surgeon: Helio Cheema MD;  Location: [facility] ([address] [place] [address]);  Service: Endoscopy;  Laterality: N/A;  5/25-Pt requesting Dr. Cheema-approval given per Dr. Cheema-ml  Fully vaccinated, prep instr portal -ml    CORONARY ANGIOGRAPHY N/A 7/20/2018    Procedure: ANGIOGRAM, CORONARY ARTERY;  Surgeon: Willard Roberts MD;  Location: [facility] CATH LAB;  Service: Cardiovascular;  Laterality: N/A;    ENDOSCOPIC ULTRASOUND OF UPPER GASTROINTESTINAL TRACT N/A 3/24/2021    Procedure: ULTRASOUND, UPPER GI TRACT, ENDOSCOPIC;  Surgeon: Helio Cheema MD;  Location: [address]);  Service: Endoscopy;  Laterality: N/A;    ENDOSCOPIC ULTRASOUND OF UPPER GASTROINTESTINAL TRACT N/A 4/25/2022    Procedure: ULTRASOUND, UPPER GI TRACT, ENDOSCOPIC;  Surgeon: Helio Cheema MD;  Location: [address]);  Service: Endoscopy;  Laterality: N/A;  cardiac risk assessment 1 per Dr. Ortez. see t/e 3/17-SC  3/25:new instructions via portal. home with medical transport?-SC    ERCP N/A 3/24/2021    Procedure: ERCP  (ENDOSCOPIC RETROGRADE CHOLANGIOPANCREATOGRAPHY);  Surgeon: Helio Cheema MD;  Location: [facility] ENDO (2ND FLR);  Service: Endoscopy;  Laterality: N/A;    ERCP N/A 4/30/2021    Procedure: ERCP (ENDOSCOPIC RETROGRADE CHOLANGIOPANCREATOGRAPHY);  Surgeon: Boo Gray MD;  Location: [facility] ENDO (2ND FLR);  Service: Endoscopy;  Laterality: N/A;    ERCP N/A 7/1/2021    Procedure: ERCP (ENDOSCOPIC RETROGRADE CHOLANGIOPANCREATOGRAPHY);  Surgeon: Helio Cheema MD;  Location: [facility] (2ND FLR);  Service: Endoscopy;  Laterality: N/A;  Ok for Taxi. Dr Cheema  rapid covid 1130am- tb inst email    ESOPHAGOGASTRODUODENOSCOPY  2015    ESOPHAGOGASTRODUODENOSCOPY N/A 3/4/2021    Procedure: EGD (ESOPHAGOGASTRODUODENOSCOPY);  Surgeon: Yenifer Ramirez MD;  Location: [facility];  Service: Endoscopy;  Laterality: N/A;    ESOPHAGOGASTRODUODENOSCOPY N/A 3/24/2021    Procedure: EGD (ESOPHAGOGASTRODUODENOSCOPY);  Surgeon: Helio Cheema MD;  Location: [facility] (2ND FLR);  Service: Endoscopy;  Laterality: N/A;    EYE SURGERY  2016    Cataracts    FRACTURE SURGERY  2014    JOINT REPLACEMENT  2007    ORIF FEMUR FRACTURE Left     TOTAL KNEE ARTHROPLASTY Left 2007    TUBAL LIGATION         Home Meds:   Prior to Admission medications    Medication Sig Start Date End Date Taking? Authorizing Provider   ALBUTEROL INHL Inhale into the lungs.    Historical Provider   aspirin (ECOTRIN) 81 MG EC tablet Take 81 mg by mouth.    Historical Provider   atorvastatin (LIPITOR) 40 MG tablet TAKE 1 TABLET BY MOUTH EVERY DAILY 8/29/22   Yane Sahni MD   azelastine (ASTELIN) 137 mcg (0.1 %) nasal spray 1 spray (137 mcg total) by Nasal route 2 (two) times daily. 4/29/22 4/29/23  Yane Sahni MD   b complex vitamins capsule Take 1 capsule by mouth once daily.    Historical Provider   calcium carbonate (OS-SANDRINE) 500 mg calcium (1,250 mg) tablet Take 2 tablets (1,000 mg total) by mouth 2 (two) times daily. 9/7/21 9/7/22  Lea GRACE  Caire, NP   clotrimazole-betamethasone 1-0.05% (LOTRISONE) cream Apply to affected area 2 times daily 9/1/22 9/1/23  Keesha Johansen NP   cyanocobalamin, vitamin B-12, 1,000 mcg TbSR Take 1,000 mcg by mouth once daily. 9/7/21   Lea Bourne NP   denosumab (PROLIA) 60 mg/mL Syrg Inject 1 mL (60 mg total) into the skin every 6 (six) months. 3/21/22 3/25/23  Yane Sahni MD   DULoxetine (CYMBALTA) 60 MG capsule Take 1 capsule (60 mg total) by mouth once daily. 8/5/22 5/2/23  JESE New   fluticasone (FLONASE) 50 mcg/actuation nasal spray 1 spray by Each Nare route 2 (two) times daily as needed for Rhinitis.    Historical Provider   fluticasone propion-salmeterol 115-21 mcg/dose (ADVAIR HFA) 115-21 mcg/actuation HFAA inhaler Inhale 2 puffs into the lungs every 12 (twelve) hours. 10/12/21 11/15/22  Yane Sahni MD   fluticasone propion-salmeterol 115-21 mcg/dose (ADVAIR HFA) 115-21 mcg/actuation HFAA inhaler Inhale 2 puffs into the lungs.    Historical Provider   gabapentin (NEURONTIN) 300 MG capsule Take 1 capsule (300 mg total) by mouth 3 (three) times daily. 9/7/21 9/16/22  Lea Bourne NP   guaiFENesin (MUCINEX) 600 mg 12 hr tablet Take 1,200 mg by mouth 2 (two) times daily as needed for Congestion.    Historical Provider   HYDROcodone-acetaminophen (NORCO)  mg per tablet Take 1 tablet by mouth every 12 (twelve) hours as needed for Pain. 8/16/22   Yane Sahni MD   HYDROcodone-acetaminophen (NORCO)  mg per tablet Take 1 tablet by mouth.    Historical Provider   losartan (COZAAR) 50 MG tablet Take 2 tablets (100 mg total) by mouth once daily. 7/15/22 7/15/23  Parvez Ortez MD   magnesium oxide (MAG-OX) 400 mg (241.3 mg magnesium) tablet Take 1 tablet by mouth every 12 (twelve) hours. 9/7/21   Lea Bourne NP   metoprolol succinate (TOPROL-XL) 25 MG 24 hr tablet Take 2 tablets (50 mg total) by mouth once daily. 9/7/21 9/17/22  Lea Bourne NP   metoprolol  tartrate (LOPRESSOR) 50 MG tablet Take 50 mg by mouth.    Historical Provider   multivit-min/iron/folic/lutein (CENTRUM SILVER WOMEN ORAL) Take 1 tablet by mouth once daily.    Historical Provider   ondansetron (ZOFRAN) 4 MG tablet Take 8 mg by mouth every 8 (eight) hours as needed.    Historical Provider   pantoprazole (PROTONIX) 40 MG tablet Take 1 tablet (40 mg total) by mouth once daily. 7/8/22   Alba Cai PA-C   potassium chloride (MICRO-K) 10 MEQ CpSR Take 10 mEq by mouth.    Historical Provider   sars-cov-2, covid-19, (MODERNA COVID-19) 50 mcg/0.25 ml injection (BOOSTER) Inject into the muscle. 7/8/22   Lona CobbD   torsemide (DEMADEX) 20 MG Tab Take 1 tablet (20 mg total) by mouth once daily. 8/29/22 8/29/23  Yane Sahni MD   traZODone (DESYREL) 100 MG tablet Take 1 tablet (100 mg total) by mouth every evening. 9/6/22 12/5/22  JESE New   traZODone (DESYREL) 50 MG tablet Take 1 tablet (50 mg total) by mouth nightly as needed for Insomnia. 6/17/22 6/17/23  JESE New   umeclidinium (INCRUSE ELLIPTA) 62.5 mcg/actuation inhalation capsule Inhale 1 puff into the lungs once daily. Controller 10/12/21   Yane Sahni MD   umeclidinium bromide (INCRUSE ELLIPTA INHL) Inhale into the lungs.    Historical Provider   vitamin D (VITAMIN D3) 1000 units Tab Take 1 tablet (1,000 Units total) by mouth once daily. 9/7/21   Lea Bourne NP   ZINC ORAL Take by mouth.    Historical Provider   albuterol (VENTOLIN HFA) 90 mcg/actuation inhaler inhale 1-2 puffs as needed every 6 hours for wheezing and shortness of breath  Patient taking differently: inhale 1-2 puffs as needed every 6 hours for wheezing and shortness of breath 9/7/21 6/8/22  Lea Bourne NP     Anticoagulants/Antiplatelets: no anticoagulation    Allergies:   Review of patient's allergies indicates:   Allergen Reactions    Wellbutrin [bupropion hcl] Other (See Comments)     Hyponatremia and hypomagnesia     Zoloft  [sertraline] Other (See Comments)     Hyponatremia and hypomagnesia     Bananas [banana]      Emesis, and stomach cramps     Sedation History:  no adverse reactions    Review of Systems:   Hematological: no known coagulopathies  Respiratory: no shortness of breath  Cardiovascular: no chest pain  Gastrointestinal: no abdominal pain  Genito-Urinary: no dysuria  Musculoskeletal: negative  Neurological: no TIA or stroke symptoms         OBJECTIVE:     Vital Signs (Most Recent)       Physical Exam:  ASA: II  Mallampati: II    General: no acute distress  Mental Status: alert and oriented to person, place and time  HEENT: normocephalic, atraumatic  Chest: unlabored breathing  Heart: regular heart rate  Abdomen: nondistended  Extremity: moves all extremities    Laboratory  Lab Results   Component Value Date    INR 1.1 04/30/2021       Lab Results   Component Value Date    WBC 7.59 07/26/2022    HGB 9.8 (L) 07/26/2022    HCT 30.9 (L) 07/26/2022    MCV 96 07/26/2022     07/26/2022      Lab Results   Component Value Date     (H) 07/26/2022     (L) 07/26/2022    K 4.3 07/26/2022    CL 98 07/26/2022    CO2 24 07/26/2022    BUN 28 (H) 07/26/2022    CREATININE 1.2 07/26/2022    CALCIUM 8.7 07/26/2022    MG 2.0 03/18/2022    ALT 13 07/26/2022    AST 12 07/26/2022    ALBUMIN 3.1 (L) 07/26/2022    BILITOT 0.3 07/26/2022    BILIDIR 0.4 (H) 07/29/2021       ASSESSMENT/PLAN:     Sedation Plan: Up to moderate  Patient will undergo lung biopsy of left upper lobe nodule.    Meghna Hernandez MD  PGY-II  Diagnostic and Interventional Radiology  [facility]

## 2022-09-12 NOTE — PLAN OF CARE
Lung bx complete. Pt tolerated well. VSS. No signs or symptoms of distress noted.  Pt will be transferred to ROCU bed and report to be given at bedside. MD to order chest x-ray in 1 hour then another in 3 hours.

## 2022-09-12 NOTE — CARE UPDATE
First CXR done and Dr. De La Torre read CXR. Dr. De La Torre states CXR looks OK and we will check another X-ray prior to D/C.

## 2022-09-12 NOTE — PLAN OF CARE
Pt arrived to IR room 173 for chest tube. Pt oriented to unit and staff. Plan of care reviewed with patient, patient verbalizes understanding. Comfort measures utilized. Pt safely transferred from stretcher to procedural table. Fall risk reviewed with patient, fall risk interventions maintained. Safety strap applied, positioner pillows utilized to minimize pressure points. Blankets applied. Pt prepped and draped utilizing standard sterile technique. Patient placed on continuous monitoring, as required by sedation policy. Timeouts completed utilizing standard universal time-out, per department and facility policy. RN to remain at bedside, continuous monitoring maintained. Pt resting comfortably. Denies pain/discomfort. Will continue to monitor. See flow sheets for monitoring, medication administration, and updates.

## 2022-09-12 NOTE — CARE UPDATE
Dr. De La Torre at bedside stating second CXR had air noted on it and patient will require a chest tube. Charge nurse ANDERSON Landaverde aware and getting  ready for CT placement. Patient toileted on pure wick and then brought to  by nurse. Vs remain stable patient with no complaints at this time. Patient texted friend to tell them she would be staying over night. Patient does not want family to know about procedure, so no family notified.

## 2022-09-12 NOTE — PLAN OF CARE
Pt arrived to IR room 173 for JASSI lung biopsy, no acute distress noted. Orders, consent and labs reviewed on chart.

## 2022-09-13 VITALS
OXYGEN SATURATION: 95 % | HEIGHT: 66 IN | SYSTOLIC BLOOD PRESSURE: 151 MMHG | DIASTOLIC BLOOD PRESSURE: 66 MMHG | TEMPERATURE: 97 F | HEART RATE: 85 BPM | RESPIRATION RATE: 16 BRPM | WEIGHT: 210 LBS | BODY MASS INDEX: 33.75 KG/M2

## 2022-09-13 LAB
ADEQUACY: NORMAL
ANION GAP SERPL CALC-SCNC: 7 MMOL/L (ref 8–16)
BUN SERPL-MCNC: 29 MG/DL (ref 8–23)
CALCIUM SERPL-MCNC: 8.6 MG/DL (ref 8.7–10.5)
CHLORIDE SERPL-SCNC: 95 MMOL/L (ref 95–110)
CO2 SERPL-SCNC: 30 MMOL/L (ref 23–29)
CREAT SERPL-MCNC: 0.8 MG/DL (ref 0.5–1.4)
EST. GFR  (NO RACE VARIABLE): >60 ML/MIN/1.73 M^2
FINAL PATHOLOGIC DIAGNOSIS: NORMAL
GLUCOSE SERPL-MCNC: 113 MG/DL (ref 70–110)
GROSS: NORMAL
Lab: NORMAL
POTASSIUM SERPL-SCNC: 3.8 MMOL/L (ref 3.5–5.1)
SODIUM SERPL-SCNC: 132 MMOL/L (ref 136–145)

## 2022-09-13 PROCEDURE — 80048 BASIC METABOLIC PNL TOTAL CA: CPT | Performed by: STUDENT IN AN ORGANIZED HEALTH CARE EDUCATION/TRAINING PROGRAM

## 2022-09-13 PROCEDURE — 99222 1ST HOSP IP/OBS MODERATE 55: CPT | Mod: GC,,, | Performed by: INTERNAL MEDICINE

## 2022-09-13 PROCEDURE — 27000221 HC OXYGEN, UP TO 24 HOURS

## 2022-09-13 PROCEDURE — 94761 N-INVAS EAR/PLS OXIMETRY MLT: CPT

## 2022-09-13 PROCEDURE — 25000003 PHARM REV CODE 250: Performed by: STUDENT IN AN ORGANIZED HEALTH CARE EDUCATION/TRAINING PROGRAM

## 2022-09-13 PROCEDURE — 99223 PR INITIAL HOSPITAL CARE,LEVL III: ICD-10-PCS | Mod: AI,GC,, | Performed by: STUDENT IN AN ORGANIZED HEALTH CARE EDUCATION/TRAINING PROGRAM

## 2022-09-13 PROCEDURE — 25000242 PHARM REV CODE 250 ALT 637 W/ HCPCS: Performed by: STUDENT IN AN ORGANIZED HEALTH CARE EDUCATION/TRAINING PROGRAM

## 2022-09-13 PROCEDURE — 63600175 PHARM REV CODE 636 W HCPCS: Performed by: STUDENT IN AN ORGANIZED HEALTH CARE EDUCATION/TRAINING PROGRAM

## 2022-09-13 PROCEDURE — 99222 PR INITIAL HOSPITAL CARE,LEVL II: ICD-10-PCS | Mod: GC,,, | Performed by: INTERNAL MEDICINE

## 2022-09-13 PROCEDURE — 94640 AIRWAY INHALATION TREATMENT: CPT

## 2022-09-13 PROCEDURE — 25000003 PHARM REV CODE 250

## 2022-09-13 PROCEDURE — 99900035 HC TECH TIME PER 15 MIN (STAT)

## 2022-09-13 PROCEDURE — 36415 COLL VENOUS BLD VENIPUNCTURE: CPT | Performed by: STUDENT IN AN ORGANIZED HEALTH CARE EDUCATION/TRAINING PROGRAM

## 2022-09-13 PROCEDURE — 11000001 HC ACUTE MED/SURG PRIVATE ROOM

## 2022-09-13 PROCEDURE — 99223 1ST HOSP IP/OBS HIGH 75: CPT | Mod: AI,GC,, | Performed by: STUDENT IN AN ORGANIZED HEALTH CARE EDUCATION/TRAINING PROGRAM

## 2022-09-13 RX ORDER — HYDROCODONE BITARTRATE AND ACETAMINOPHEN 10; 325 MG/1; MG/1
1 TABLET ORAL EVERY 6 HOURS PRN
Status: DISCONTINUED | OUTPATIENT
Start: 2022-09-13 | End: 2022-09-14 | Stop reason: HOSPADM

## 2022-09-13 RX ADMIN — LOSARTAN POTASSIUM 100 MG: 50 TABLET, FILM COATED ORAL at 09:09

## 2022-09-13 RX ADMIN — DULOXETINE 60 MG: 60 CAPSULE, DELAYED RELEASE ORAL at 09:09

## 2022-09-13 RX ADMIN — PANTOPRAZOLE SODIUM 40 MG: 40 TABLET, DELAYED RELEASE ORAL at 09:09

## 2022-09-13 RX ADMIN — HYDROCODONE BITARTRATE AND ACETAMINOPHEN 1 TABLET: 10; 325 TABLET ORAL at 02:09

## 2022-09-13 RX ADMIN — TORSEMIDE 20 MG: 20 TABLET ORAL at 09:09

## 2022-09-13 RX ADMIN — ATORVASTATIN CALCIUM 40 MG: 40 TABLET, FILM COATED ORAL at 08:09

## 2022-09-13 RX ADMIN — HYDROCODONE BITARTRATE AND ACETAMINOPHEN 1 TABLET: 10; 325 TABLET ORAL at 11:09

## 2022-09-13 RX ADMIN — TIOTROPIUM BROMIDE INHALATION SPRAY 2 PUFF: 3.12 SPRAY, METERED RESPIRATORY (INHALATION) at 09:09

## 2022-09-13 RX ADMIN — FLUTICASONE FUROATE AND VILANTEROL TRIFENATATE 1 PUFF: 100; 25 POWDER RESPIRATORY (INHALATION) at 07:09

## 2022-09-13 RX ADMIN — METOPROLOL SUCCINATE 50 MG: 50 TABLET, EXTENDED RELEASE ORAL at 09:09

## 2022-09-13 RX ADMIN — GABAPENTIN 300 MG: 300 CAPSULE ORAL at 02:09

## 2022-09-13 RX ADMIN — ENOXAPARIN SODIUM 40 MG: 100 INJECTION SUBCUTANEOUS at 04:09

## 2022-09-13 RX ADMIN — GABAPENTIN 300 MG: 300 CAPSULE ORAL at 08:09

## 2022-09-13 RX ADMIN — GABAPENTIN 300 MG: 300 CAPSULE ORAL at 09:09

## 2022-09-13 RX ADMIN — HYDROCODONE BITARTRATE AND ACETAMINOPHEN 1 TABLET: 10; 325 TABLET ORAL at 08:09

## 2022-09-13 RX ADMIN — HYDROCODONE BITARTRATE AND ACETAMINOPHEN 1 TABLET: 10; 325 TABLET ORAL at 04:09

## 2022-09-13 RX ADMIN — TRAZODONE HYDROCHLORIDE 50 MG: 50 TABLET ORAL at 08:09

## 2022-09-13 NOTE — ASSESSMENT & PLAN NOTE
Patient with a pneumothorax after biopsy of JASSI nodule. Patient has a chest tube placed and is currently breathing comfortably on her home O2 level of 3L. Has some pain but it is manageable.     - Chest tube in place, minimal SS output    - Supplemental O2   - Defer to IR for management of chest tube   - Continue home norco for pain control PRN q 6 hours  - FU with oncologist outpatient to discuss

## 2022-09-13 NOTE — CARE UPDATE
Patient transported to room 625a. Report given to oncoming nurse ANDERSON Man and to charge nurse.

## 2022-09-13 NOTE — ASSESSMENT & PLAN NOTE
Pneumothorax after CT guided biopsy of new JASSI nodule on 9/12. IR placed chest tube at that time. Pulmonology consulted for chest tube management. Pt complianing of pain at site. Tube set to water seal and without leak or significant output, dressing dry clean and in place.  - Repeat CXR today  - Optimize pain management   - Follow up CXR today, if lung not expanded fully, set tube to suction. If lung expanded fully, clamp tube and repeat CXR tomorrow.

## 2022-09-13 NOTE — PLAN OF CARE
Problem: Adult Inpatient Plan of Care  Goal: Plan of Care Review  Outcome: Ongoing, Progressing  Goal: Patient-Specific Goal (Individualized)  Outcome: Ongoing, Progressing  Goal: Absence of Hospital-Acquired Illness or Injury  Outcome: Ongoing, Progressing  Goal: Optimal Comfort and Wellbeing  Outcome: Ongoing, Progressing  Goal: Readiness for Transition of Care  Outcome: Ongoing, Progressing     Problem: Fall Injury Risk  Goal: Absence of Fall and Fall-Related Injury  Outcome: Ongoing, Progressing     Problem: Respiratory Compromise (Pneumothorax)  Goal: Optimal Oxygenation and Ventilation  Outcome: Ongoing, Progressing   Chest tube clamped at stopcock by pulmonary and cxr ordered for the am.  Open stopcock to waterseal prn respiratory distress.  Tolerating clamped at this time.

## 2022-09-13 NOTE — SUBJECTIVE & OBJECTIVE
Interval History: NAEO. Chest tube in place with no purulence or erythema. Pulm is following for chest tube management. Pulm recently clamped chest tube and will see how pt tolerates in the AM. Will get repeat CXR. Hopeful for discharge tomorrow.    Review of Systems   Constitutional:  Negative for chills and fever.   HENT:  Negative for congestion and sore throat.    Eyes:  Negative for pain.   Respiratory:  Negative for cough and shortness of breath.    Cardiovascular:  Negative for chest pain, palpitations and leg swelling.   Gastrointestinal:  Negative for abdominal pain, constipation, diarrhea, nausea and vomiting.   Genitourinary:  Negative for difficulty urinating, dysuria and hematuria.   Musculoskeletal:  Negative for back pain.   Skin:  Negative for rash.   Neurological:  Negative for light-headedness and headaches.   Hematological:  Does not bruise/bleed easily.   Psychiatric/Behavioral:  Negative for agitation.    Objective:     Vital Signs (Most Recent):  Temp: 97.8 °F (36.6 °C) (09/13/22 1214)  Pulse: 77 (09/13/22 1214)  Resp: 18 (09/13/22 1214)  BP: 138/63 (09/13/22 1214)  SpO2: 100 % (09/13/22 1214) Vital Signs (24h Range):  Temp:  [97 °F (36.1 °C)-98.5 °F (36.9 °C)] 97.8 °F (36.6 °C)  Pulse:  [77-92] 77  Resp:  [14-22] 18  SpO2:  [95 %-100 %] 100 %  BP: (131-192)/(63-90) 138/63     Weight: 104.5 kg (230 lb 6.1 oz)  Body mass index is 37.18 kg/m².    Intake/Output Summary (Last 24 hours) at 9/13/2022 1343  Last data filed at 9/13/2022 0600  Gross per 24 hour   Intake 200 ml   Output 805 ml   Net -605 ml      Physical Exam  Vitals and nursing note reviewed.   Constitutional:       General: She is not in acute distress.     Appearance: Normal appearance. She is obese. She is not ill-appearing or diaphoretic.   HENT:      Head: Normocephalic and atraumatic.      Mouth/Throat:      Mouth: Mucous membranes are moist.   Eyes:      Extraocular Movements: Extraocular movements intact.      Pupils: Pupils are  equal, round, and reactive to light.   Cardiovascular:      Rate and Rhythm: Normal rate and regular rhythm.      Pulses: Normal pulses.      Heart sounds: Normal heart sounds. No murmur heard.    No gallop.   Pulmonary:      Effort: Pulmonary effort is normal. No respiratory distress.      Breath sounds: Normal breath sounds.      Comments: Breath sounds present in all left lung zones. Mild wheezing heard throughout. Chest tube present with minimal SS output.   Abdominal:      General: Abdomen is flat. There is no distension.      Palpations: Abdomen is soft.      Tenderness: There is no abdominal tenderness.   Musculoskeletal:         General: Normal range of motion.      Cervical back: Normal range of motion and neck supple.      Right lower leg: No edema.      Left lower leg: No edema.   Skin:     General: Skin is warm.      Capillary Refill: Capillary refill takes less than 2 seconds.   Neurological:      General: No focal deficit present.      Mental Status: She is alert and oriented to person, place, and time.   Psychiatric:         Mood and Affect: Mood normal.         Behavior: Behavior normal.       Significant Labs: All pertinent labs within the past 24 hours have been reviewed.    Significant Imaging: I have reviewed all pertinent imaging results/findings within the past 24 hours.

## 2022-09-13 NOTE — SUBJECTIVE & OBJECTIVE
Past Medical History:   Diagnosis Date    Anxiety     Cervical spinal stenosis     Choledocholithiasis     Chronic obstructive pulmonary disease 03/16/2016    Degenerative disc disease of cervical spine     Diverticulosis 04/24/2018    History of alcohol abuse 03/02/2021    History of gastric ulcer     History of L3 compression fracture 12/19/2018    History of lung cancer     s/p completion of XRT in 10/2020    Hyperlipidemia     Iron deficiency anemia     Osteoarthritis     Stage III CKD 2017       Past Surgical History:   Procedure Laterality Date    BREAST CYST EXCISION Right     1967    CATARACT EXTRACTION      COLONOSCOPY  2015    COLONOSCOPY N/A 6/8/2022    Procedure: COLONOSCOPY;  Surgeon: Helio Cheema MD;  Location: [facility] ([address] [place] [address]);  Service: Endoscopy;  Laterality: N/A;  5/25-Pt requesting Dr. Cheema-approval given per Dr. Cheema-ml  Fully vaccinated, prep instr portal -ml    CORONARY ANGIOGRAPHY N/A 7/20/2018    Procedure: ANGIOGRAM, CORONARY ARTERY;  Surgeon: Willard Roberts MD;  Location: [facility] CATH LAB;  Service: Cardiovascular;  Laterality: N/A;    ENDOSCOPIC ULTRASOUND OF UPPER GASTROINTESTINAL TRACT N/A 3/24/2021    Procedure: ULTRASOUND, UPPER GI TRACT, ENDOSCOPIC;  Surgeon: Helio Cheema MD;  Location: [address]);  Service: Endoscopy;  Laterality: N/A;    ENDOSCOPIC ULTRASOUND OF UPPER GASTROINTESTINAL TRACT N/A 4/25/2022    Procedure: ULTRASOUND, UPPER GI TRACT, ENDOSCOPIC;  Surgeon: Helio Cheema MD;  [address]  Service: Endoscopy;  Laterality: N/A;  cardiac risk assessment 1 per Dr. Ortez. see t/e 3/17-SC  3/25:new instructions via portal. home with medical transport?-SC    ERCP N/A 3/24/2021    Procedure: ERCP (ENDOSCOPIC RETROGRADE CHOLANGIOPANCREATOGRAPHY);  Surgeon: Helio Cheema MD;  [facility]  Service: Endoscopy;  Laterality: N/A;    ERCP N/A 4/30/2021    Procedure: ERCP (ENDOSCOPIC RETROGRADE  CHOLANGIOPANCREATOGRAPHY);  Surgeon: Boo Gray MD;  Location: [facility] (2ND FLR);  Service: Endoscopy;  Laterality: N/A;    ERCP N/A 7/1/2021    Procedure: ERCP (ENDOSCOPIC RETROGRADE CHOLANGIOPANCREATOGRAPHY);  Surgeon: Helio Cheema MD;  Location: [facility] (2ND FLR);  Service: Endoscopy;  Laterality: N/A;  Ok for Taxi. Dr Cheema  rapid covid 1130am- tb inst email    ESOPHAGOGASTRODUODENOSCOPY  2015    ESOPHAGOGASTRODUODENOSCOPY N/A 3/4/2021    Procedure: EGD (ESOPHAGOGASTRODUODENOSCOPY);  Surgeon: Yenifer Ramirez MD;  Location: [facility];  Service: Endoscopy;  Laterality: N/A;    ESOPHAGOGASTRODUODENOSCOPY N/A 3/24/2021    Procedure: EGD (ESOPHAGOGASTRODUODENOSCOPY);  Surgeon: Helio Cheema MD;  Location: [facility] (2ND FLR);  Service: Endoscopy;  Laterality: N/A;    EYE SURGERY  2016    Cataracts    FRACTURE SURGERY  2014    JOINT REPLACEMENT  2007    ORIF FEMUR FRACTURE Left     TOTAL KNEE ARTHROPLASTY Left 2007    TUBAL LIGATION         Review of patient's allergies indicates:   Allergen Reactions    Wellbutrin [bupropion hcl] Other (See Comments)     Hyponatremia and hypomagnesia     Zoloft [sertraline] Other (See Comments)     Hyponatremia and hypomagnesia     Bananas [banana]      Emesis, and stomach cramps       Current Facility-Administered Medications on File Prior to Encounter   Medication    0.9%  NaCl infusion    fentaNYL 50 mcg/mL injection    fentaNYL 50 mcg/mL injection    hydrALAZINE injection    LIDOcaine (PF) 10 mg/ml (1%) injection 10 mg    LIDOcaine HCL 10 mg/ml (1%) injection    midazolam (VERSED) 1 mg/mL injection    oxyCODONE-acetaminophen (PERCOCET) 5-325 mg 5-325 mg per tablet    [COMPLETED] oxyCODONE-acetaminophen 5-325 mg per tablet 1 tablet     Current Outpatient Medications on File Prior to Encounter   Medication Sig    ALBUTEROL INHL Inhale into the lungs.    atorvastatin (LIPITOR) 40 MG tablet TAKE 1 TABLET BY MOUTH EVERY DAILY    azelastine (ASTELIN) 137 mcg (0.1  %) nasal spray 1 spray (137 mcg total) by Nasal route 2 (two) times daily.    b complex vitamins capsule Take 1 capsule by mouth once daily.    calcium carbonate (OS-SANDRINE) 500 mg calcium (1,250 mg) tablet Take 2 tablets (1,000 mg total) by mouth 2 (two) times daily.    clotrimazole-betamethasone 1-0.05% (LOTRISONE) cream Apply to affected area 2 times daily    cyanocobalamin, vitamin B-12, 1,000 mcg TbSR Take 1,000 mcg by mouth once daily.    denosumab (PROLIA) 60 mg/mL Syrg Inject 1 mL (60 mg total) into the skin every 6 (six) months.    DULoxetine (CYMBALTA) 60 MG capsule Take 1 capsule (60 mg total) by mouth once daily.    fluticasone (FLONASE) 50 mcg/actuation nasal spray 1 spray by Each Nare route 2 (two) times daily as needed for Rhinitis.    fluticasone propion-salmeterol 115-21 mcg/dose (ADVAIR HFA) 115-21 mcg/actuation HFAA inhaler Inhale 2 puffs into the lungs every 12 (twelve) hours.    fluticasone propion-salmeterol 115-21 mcg/dose (ADVAIR HFA) 115-21 mcg/actuation HFAA inhaler Inhale 2 puffs into the lungs.    gabapentin (NEURONTIN) 300 MG capsule Take 1 capsule (300 mg total) by mouth 3 (three) times daily.    guaiFENesin (MUCINEX) 600 mg 12 hr tablet Take 1,200 mg by mouth 2 (two) times daily as needed for Congestion.    HYDROcodone-acetaminophen (NORCO)  mg per tablet Take 1 tablet by mouth every 12 (twelve) hours as needed for Pain.    HYDROcodone-acetaminophen (NORCO)  mg per tablet Take 1 tablet by mouth.    losartan (COZAAR) 50 MG tablet Take 2 tablets (100 mg total) by mouth once daily.    magnesium oxide (MAG-OX) 400 mg (241.3 mg magnesium) tablet Take 1 tablet by mouth every 12 (twelve) hours.    metoprolol succinate (TOPROL-XL) 25 MG 24 hr tablet Take 2 tablets (50 mg total) by mouth once daily.    metoprolol tartrate (LOPRESSOR) 50 MG tablet Take 50 mg by mouth.    multivit-min/iron/folic/lutein (CENTRUM SILVER WOMEN ORAL) Take 1 tablet by mouth once daily.    ondansetron (ZOFRAN)  4 MG tablet Take 8 mg by mouth every 8 (eight) hours as needed.    pantoprazole (PROTONIX) 40 MG tablet Take 1 tablet (40 mg total) by mouth once daily.    potassium chloride (MICRO-K) 10 MEQ CpSR Take 10 mEq by mouth.    sars-cov-2, covid-19, (MODERNA COVID-19) 50 mcg/0.25 ml injection (BOOSTER) Inject into the muscle.    torsemide (DEMADEX) 20 MG Tab Take 1 tablet (20 mg total) by mouth once daily.    traZODone (DESYREL) 100 MG tablet Take 1 tablet (100 mg total) by mouth every evening.    traZODone (DESYREL) 50 MG tablet Take 1 tablet (50 mg total) by mouth nightly as needed for Insomnia.    umeclidinium (INCRUSE ELLIPTA) 62.5 mcg/actuation inhalation capsule Inhale 1 puff into the lungs once daily. Controller    umeclidinium bromide (INCRUSE ELLIPTA INHL) Inhale into the lungs.    vitamin D (VITAMIN D3) 1000 units Tab Take 1 tablet (1,000 Units total) by mouth once daily.    ZINC ORAL Take by mouth.    [DISCONTINUED] albuterol (VENTOLIN HFA) 90 mcg/actuation inhaler inhale 1-2 puffs as needed every 6 hours for wheezing and shortness of breath (Patient taking differently: inhale 1-2 puffs as needed every 6 hours for wheezing and shortness of breath)    [DISCONTINUED] aspirin (ECOTRIN) 81 MG EC tablet Take 81 mg by mouth.     Family History       Problem Relation (Age of Onset)    Alzheimer's disease Mother    Arthritis Father, Brother    Cancer Sister    Colon cancer Brother    Dementia Mother    Depression Mother    Hypertension Mother, Brother    Miscarriages / Stillbirths Sister    Myasthenia gravis Father    No Known Problems Son    Rectal cancer Father          Tobacco Use    Smoking status: Every Day     Packs/day: 1.00     Years: 50.00     Pack years: 50.00     Types: Cigarettes    Smokeless tobacco: Never   Substance and Sexual Activity    Alcohol use: Yes     Alcohol/week: 7.0 standard drinks     Types: 7 Shots of liquor per week     Comment: at least 1 cocktail a day    Drug use: No    Sexual activity:  Not Currently     Birth control/protection: Abstinence     Review of Systems   Constitutional:  Negative for chills and fever.   HENT:  Negative for congestion and sore throat.    Eyes:  Negative for pain.   Respiratory:  Positive for shortness of breath. Negative for cough.    Cardiovascular:  Negative for chest pain, palpitations and leg swelling.   Gastrointestinal:  Negative for abdominal pain, constipation, diarrhea, nausea and vomiting.   Genitourinary:  Negative for difficulty urinating, dysuria and hematuria.   Musculoskeletal:  Negative for back pain.   Skin:  Negative for rash.   Neurological:  Negative for light-headedness and headaches.   Hematological:  Does not bruise/bleed easily.   Psychiatric/Behavioral:  Negative for agitation.    Objective:     Vital Signs (Most Recent):    Vital Signs (24h Range):  Temp:  [97 °F (36.1 °C)-99 °F (37.2 °C)] 97 °F (36.1 °C)  Pulse:  [79-95] 79  Resp:  [15-22] 17  SpO2:  [95 %-100 %] 100 %  BP: (131-191)/(66-90) 178/76        There is no height or weight on file to calculate BMI.    Physical Exam  Constitutional:       Appearance: Normal appearance.   HENT:      Head: Normocephalic and atraumatic.      Mouth/Throat:      Mouth: Mucous membranes are moist.   Eyes:      Extraocular Movements: Extraocular movements intact.      Pupils: Pupils are equal, round, and reactive to light.   Cardiovascular:      Rate and Rhythm: Normal rate and regular rhythm.      Pulses: Normal pulses.      Heart sounds: Normal heart sounds.   Pulmonary:      Effort: Pulmonary effort is normal. No respiratory distress.      Breath sounds: Normal breath sounds.      Comments: Breath sounds present in all left lung zones. Mild wheezing heard throughout. Chest tube present with minimal SS output.   Abdominal:      General: Abdomen is flat. There is no distension.      Palpations: Abdomen is soft.      Tenderness: There is no abdominal tenderness.   Musculoskeletal:         General: Normal range  of motion.      Cervical back: Normal range of motion and neck supple.      Right lower leg: No edema.      Left lower leg: No edema.   Skin:     General: Skin is warm.   Neurological:      General: No focal deficit present.      Mental Status: She is alert and oriented to person, place, and time.   Psychiatric:         Mood and Affect: Mood normal.         Behavior: Behavior normal.         CRANIAL NERVES     CN III, IV, VI   Pupils are equal, round, and reactive to light.     Significant Labs: All pertinent labs within the past 24 hours have been reviewed.    Significant Imaging: I have reviewed all pertinent imaging results/findings within the past 24 hours.

## 2022-09-13 NOTE — CONSULTS
[facility]  Pulmonology  Consult Note    Patient Name: Nalini Varma  MRN: [id number]  Admission Date: 9/12/2022  Hospital Length of Stay: 1 days  Code Status: Full Code  Attending Physician: Sylvester Arias MD  Primary Care Provider: Yane Sahni MD   Principal Problem: Pneumothorax after biopsy    Consults  Subjective:     HPI:  Ms Nalini Varma is a 71 y.o. F with pmhx CHF (EF 38%), HTN, arthritis, CKD, COPD (3L home oxygen, >50 pack years smoking history, RUL adenocarcinoma s/p radiation therapy, and new JASSI nodule. She is s/p chemotherapy & radiation therapy to the RUL. Ms Varma underwent IR CT lung biopsy of the new JASSI lung nodule 9/12 but suffered a pneumothorax after the procedure. She then had a chest tube placed to treat the pneumo. Pulmonology was consulted to manage the chest tube.    Today Ms Varma reports feeling ok, she is short of breath but on her home O2 of 3L. She reports some chest pain at the tube site. On exam her chest tube is set to water seal, without air leak, and the tube site is clean and dry. She denies any other symptoms.      Past Medical History:   Diagnosis Date    Anxiety     Cervical spinal stenosis     Choledocholithiasis     Chronic obstructive pulmonary disease 03/16/2016    Degenerative disc disease of cervical spine     Diverticulosis 04/24/2018    History of alcohol abuse 03/02/2021    History of gastric ulcer     History of L3 compression fracture 12/19/2018    History of lung cancer     s/p completion of XRT in 10/2020    Hyperlipidemia     Iron deficiency anemia     Osteoarthritis     Stage III CKD 2017       Past Surgical History:   Procedure Laterality Date    BREAST CYST EXCISION Right     1967    CATARACT EXTRACTION      COLONOSCOPY  2015    COLONOSCOPY N/A 6/8/2022    Procedure: COLONOSCOPY;  Surgeon: Helio Cheema MD;  Location: [facility] ([address] [place] [address]);  Service: Endoscopy;  Laterality: N/A;  5/25-Pt requesting   PROGRESS NOTE  HOSPITALIST SERVICE    Patient: Danny Mcdonald   YOB: 1953   MR Number: [id number]       Today's Date: 9/14/2020   Date of Admission: 9/13/2020   Attending Physician: Edmund Galicia MD. FACP.     Code Status:  Full Resuscitation    Hospital Day: 2    Primary Care Provider: BRIDGET Banks  Consulting Physician(s):   IP Consult Orders (From admission, onward)     Start     Ordered    09/13/20 1753  Inpatient consult to Urology  ONE TIME     Provider:  Khang Hernandes MD    09/13/20 1752              Transfusion:  None   Procedures:  Recent cystoscopy with clot evacuation and fulguration of prostate urethra discharge.    Active Issues and Reason for Visit:  Principal Problem:    Hematuria, gross  Active Problems:    Severe peripheral arterial disease (CMS/HCC)    Benign essential HTN    BPH with urinary obstruction    Microcytic anemia    Acute blood loss anemia      Assessment and Plan:  Problem list as noted above.  · Recurrent hematuria.  Patient had gross hematuria, acute blood loss anemia from hematuria.  Urology is already consulted, patient will be continue with CBI.  He was still forming blood clots, requiring CBI this morning and further irrigation.  · 1 unit of packed RBC will be transfused.  Patient's anemia is getting worse, he will be changed to inpatient status, he will be staying in the hospital 2 nights.  Hemoglobin be monitor very closely.  Continue gentle IV fluid.  Continue CBI.  Urology is consulted.  · Patient is on Plavix for peripheral vascular disease.  He does not have any cardiac pulmonary complaints or symptoms.  Will need to hold off on Plavix at least 10-14 days.  At the time of discharge, it will be discussed with the prescriber regarding Plavix.   · DVT prophylaxis with SCD and Que's.  · Patient has history of chronic medical conditions including severe peripheral vascular disease, hemorrhoids, history of urinary retention, hypertension, and microcytic  Deni-approval given per Dr. Cheema-enrico  Fully vaccinated, prep instr portal -ml    CORONARY ANGIOGRAPHY N/A 7/20/2018    Procedure: ANGIOGRAM, CORONARY ARTERY;  Surgeon: Willard Roberts MD;  Location: [facility] CATH LAB;  Service: Cardiovascular;  Laterality: N/A;    ENDOSCOPIC ULTRASOUND OF UPPER GASTROINTESTINAL TRACT N/A 3/24/2021    Procedure: ULTRASOUND, UPPER GI TRACT, ENDOSCOPIC;  Surgeon: Helio Cheema MD;  Location: [facility] ENDO (2ND FLR);  Service: Endoscopy;  Laterality: N/A;    ENDOSCOPIC ULTRASOUND OF UPPER GASTROINTESTINAL TRACT N/A 4/25/2022    Procedure: ULTRASOUND, UPPER GI TRACT, ENDOSCOPIC;  Surgeon: Helio Cheema MD;  Location: [facility] (2ND FLR);  Service: Endoscopy;  Laterality: N/A;  cardiac risk assessment 1 per Dr. Ortez. see t/e 3/17-SC  3/25:new instructions via portal. home with medical transport?-SC    ERCP N/A 3/24/2021    Procedure: ERCP (ENDOSCOPIC RETROGRADE CHOLANGIOPANCREATOGRAPHY);  Surgeon: Helio Cheema MD;  Location: [facility] (2ND FLR);  Service: Endoscopy;  Laterality: N/A;    ERCP N/A 4/30/2021    Procedure: ERCP (ENDOSCOPIC RETROGRADE CHOLANGIOPANCREATOGRAPHY);  Surgeon: Boo Gray MD;  Location: [facility] (2ND FLR);  Service: Endoscopy;  Laterality: N/A;    ERCP N/A 7/1/2021    Procedure: ERCP (ENDOSCOPIC RETROGRADE CHOLANGIOPANCREATOGRAPHY);  Surgeon: Helio Cheema MD;  Location: [facility] (2ND FLR);  Service: Endoscopy;  Laterality: N/A;  Ok for Taxi. Dr Cheema  rapid covid 1130am- tb inst email    ESOPHAGOGASTRODUODENOSCOPY  2015    ESOPHAGOGASTRODUODENOSCOPY N/A 3/4/2021    Procedure: EGD (ESOPHAGOGASTRODUODENOSCOPY);  Surgeon: Yenifer Ramirez MD;  Location: [facility];  Service: Endoscopy;  Laterality: N/A;    ESOPHAGOGASTRODUODENOSCOPY N/A 3/24/2021    Procedure: EGD (ESOPHAGOGASTRODUODENOSCOPY);  Surgeon: Helio Cheema MD;  Location: [facility] ([address] [place] [address]);  Service: Endoscopy;  Laterality: N/A;    EYE SURGERY  2016    Cataracts  anemia.  All the chronic medical conditions are present at the time of admission, will be monitored and treated.    Disposition:  Anticipate discharge within next 1-2 days.    All orders have been entered electronically through [employer].  Patient seen during multidisciplinary rounds with RN and .  Care plan communicated with patient and staff.    Subjective:  Danny Mcdonald is a 66 year old male who is being seen in follow up for Hematuria, gross.  Patient was lying in the bed, he denies fever, chills, nausea, vomiting, shortness of breath and chest pain.    Past Medical/Surgical/Social/Family Histories reviewed with patient as recorded in the admit H&P (dated 9/13/2020).  Meds and Allergies reviewed with patient as recorded in EPIC.    Scheduled meds and infusions:  • atorvastatin  80 mg Oral Nightly   • finasteride  5 mg Oral Daily   • fluticasone-vilanterol  1 puff Inhalation Daily Resp   • metoPROLOL tartrate  25 mg Oral 2 times per day   • pantoprazole  40 mg Oral Daily   • tamsulosin  0.4 mg Oral Daily PC   • sodium chloride (PF)  2 mL Intracatheter 2 times per day   • Magnesium Standard Replacement Protocol   Does not apply See Admin Instructions   • Potassium Standard Replacement Protocol   Does not apply See Admin Instructions     • sodium chloride 0.9% infusion         Review of Systems:  Constitutional:  (-)generalized weakness, (-)fever, (-)chills, (-)nights sweats, (-)unintentional weight loss  Eyes:  (-)blurry vision, (-)diplopia, (-)conjunctivitis, (-)discharge   HENT:  (-)trauma, (-)headache, (-)change in hearing or tinnitus, (-)rhinorrhea, (-)bloody nose, (-)sore throat, (-)difficulty swallowing, (-)thrush  Respiratory:  (-)cough, (-)wheezing, (-)shortness of breath, (-)dyspnea on exertion  Cardiovascular:  (-)chest pain, (-)palpitations, (-)PND, (-)orthopnea, (-)lower extremity edema  Gastrointestinal:  (-)abdominal pain, (-)distention, (-)nausea, (-)vomiting, (-)diarrhea, (-)constipation,     FRACTURE SURGERY  2014    JOINT REPLACEMENT  2007    ORIF FEMUR FRACTURE Left     TOTAL KNEE ARTHROPLASTY Left 2007    TUBAL LIGATION         Review of patient's allergies indicates:   Allergen Reactions    Wellbutrin [bupropion hcl] Other (See Comments)     Hyponatremia and hypomagnesia     Zoloft [sertraline] Other (See Comments)     Hyponatremia and hypomagnesia     Bananas [banana]      Emesis, and stomach cramps       Family History       Problem Relation (Age of Onset)    Alzheimer's disease Mother    Arthritis Father, Brother    Cancer Sister    Colon cancer Brother    Dementia Mother    Depression Mother    Hypertension Mother, Brother    Miscarriages / Stillbirths Sister    Myasthenia gravis Father    No Known Problems Son    Rectal cancer Father          Tobacco Use    Smoking status: Every Day     Packs/day: 1.00     Years: 50.00     Pack years: 50.00     Types: Cigarettes    Smokeless tobacco: Never   Substance and Sexual Activity    Alcohol use: Yes     Alcohol/week: 7.0 standard drinks     Types: 7 Shots of liquor per week     Comment: at least 1 cocktail a day    Drug use: No    Sexual activity: Not Currently     Birth control/protection: Abstinence         Review of Systems   Constitutional:  Negative for chills and fever.   HENT:  Negative for congestion and sore throat.    Respiratory:  Positive for shortness of breath and wheezing. Negative for cough.    Cardiovascular:  Positive for chest pain. Negative for palpitations and leg swelling.   Gastrointestinal:  Negative for abdominal distention, abdominal pain, blood in stool, constipation, diarrhea, nausea and vomiting.   Genitourinary:  Negative for dysuria, flank pain and hematuria.   Neurological:  Negative for dizziness, light-headedness and headaches.   Objective:     Vital Signs (Most Recent):  Temp: 98.5 °F (36.9 °C) (09/13/22 0754)  Pulse: 80 (09/13/22 0906)  Resp: 14 (09/13/22 0906)  BP: (!) 149/70 (09/13/22 0903)  SpO2: 98  (-)bloody stool, (-)palpable masses  Genitourinary:  (-)dysuria, (+)hematuria, (-)urinary frequency  Musculoskeletal:  (-)joint swelling, (-)deformity, (-)myalgias, (-)arthralgias, (-)back pain  Integument:  (-)skin rash, (-)sores, (-)open ulcers  Neurologic:  (-)numbness or tingling, (-)asymmetric loss of strength or function of extremities, (-)changes in speech, (-)memory changes, (-)balance changes  Endocrine:  (-)polyuria, (-)polydipsia, (-)unintentional weight gain  Lymphatic:  (-)abnormal bruising or bleeding, (-)swollen glands, (-)lymphedema  Psychiatric: (-)changes in mood or affect, (-)anxiety, (-)confusion, (-)depression  Other:  None    OBJECTIVE:  Vital 24 Hour Range Most Recent Value   Temperature Temp  Min: 97.9 °F (36.6 °C)  Max: 98.6 °F (37 °C) 97.9 °F (36.6 °C)   Pulse Pulse  Min: 85  Max: 92 85   Respiratory Resp  Min: 16  Max: 18 18   Blood Pressure BP  Min: 109/62  Max: 129/74 113/58   Pulse Oximetry SpO2  Min: 96 %  Max: 100 %    O2 No data recorded      Vital Most Recent Value First Value   Weight 55.6 kg (09/13/20 1800) Weight: 55.6 kg (09/13/20 1800)   BMI Body mass index is 17.59 kg/m². BMI (Calculated): 17.59 (09/13/20 1800)       Intake/Output Summary (Last 24 hours) at 9/14/2020 0829  Last data filed at 9/14/2020 0800  Gross per 24 hour   Intake 87994 ml   Output 62120 ml   Net -8350 ml     Current diet:  Npo Diet With Exceptions; Medications, Sips Of Water    Physical Exam:  General - patient is sitting up, not in acute distress.  Cor - S1, S2, RR.  Pulmonary - clear to auscultate bilaterally, no rales or wheezing.  Abdomen - positive bowel sounds, soft, nontender, not distended.  CBI is noted.  Extremities  -  warm without clubbing or cyanosis.  - edema.  Skin - no rashes or lesions.  Warm and dry.   No decubitus ulcers.    Ancillary Studies including laboratory results are reviewed as recorded in [facility] 9/14/2020 8:29 AM.  A subset of labs and results are listed below:  lab last 12 hrs;    Recent Results (from the past 12 hour(s))   Magnesium    Collection Time: 09/14/20  5:26 AM   Result Value Ref Range    Magnesium 2.0 1.7 - 2.4 mg/dL   Basic Metabolic Panel    Collection Time: 09/14/20  5:26 AM   Result Value Ref Range    Fasting Status      Sodium 137 135 - 145 mmol/L    Potassium 3.2 (L) 3.4 - 5.1 mmol/L    Chloride 105 98 - 107 mmol/L    Carbon Dioxide 28 21 - 32 mmol/L    Anion Gap 7 (L) 10 - 20 mmol/L    Glucose 92 65 - 99 mg/dL    BUN 10 6 - 20 mg/dL    Creatinine 0.91 0.67 - 1.17 mg/dL    Glomerular Filtration Rate >90 >90 mL/min/1.73m2    BUN/ Creatinine Ratio 11 7 - 25    Calcium 8.4 8.4 - 10.2 mg/dL   CBC with Automated Differential (performable only)    Collection Time: 09/14/20  5:26 AM   Result Value Ref Range    WBC 10.5 4.2 - 11.0 K/mcL    RBC 2.97 (L) 4.50 - 5.90 mil/mcL    HGB 7.0 (L) 13.0 - 17.0 g/dL    HCT 20.8 (L) 39.0 - 51.0 %    MCV 70.0 (L) 78.0 - 100.0 fl    MCH 23.6 (L) 26.0 - 34.0 pg    MCHC 33.7 32.0 - 36.5 g/dL    RDW-CV 32.9 (H) 11.0 - 15.0 %     (H) 140 - 450 K/mcL    NRBC 0 <=0 /100 WBC    Neutrophil, Percent 73 %    Lymphocytes, Percent 14 %    Mono, Percent 10 %    Eosinophils, Percent 2 %    Basophils, Percent 1 %    Immature Granulocytes 0 %    Absolute Neutrophils 7.8 (H) 1.8 - 7.7 K/mcL    Absolute Lymphocytes 1.5 1.0 - 4.0 K/mcL    Absolute Monocytes 1.0 (H) 0.3 - 0.9 K/mcL    Absolute Eosinophils  0.2 0.1 - 0.5 K/mcL    Absolute Basophils 0.1 0.0 - 0.3 K/mcL    Absolute Immmature Granulocytes 0.0 0.0 - 0.2 K/mcL    RDW-SD     Manual Differential    Collection Time: 09/14/20  5:26 AM   Result Value Ref Range    WBC Morphology Normal Normal    Hypochromia Moderate     Macrocytosis Few     Microcytosis Moderate     Platelet Morphology Normal Normal    Target Cells Moderate    Prepare Red Blood Cells: 1 Units    Collection Time: 09/14/20  8:27 AM   Result Value Ref Range    UNIT BLOOD TYPE O Pos     ISBT BLOOD TYPE 5100     BLOOD EXPIRATION DATE  % (09/13/22 0754)   Vital Signs (24h Range):  Temp:  [97 °F (36.1 °C)-98.5 °F (36.9 °C)] 98.5 °F (36.9 °C)  Pulse:  [77-93] 80  Resp:  [14-22] 16  SpO2:  [95 %-100 %] 98 %  BP: (131-192)/(66-90) 149/70     Weight: 104.5 kg (230 lb 6.1 oz)  Body mass index is 37.18 kg/m².      Intake/Output Summary (Last 24 hours) at 9/13/2022 1151  Last data filed at 9/13/2022 0600  Gross per 24 hour   Intake 200 ml   Output 805 ml   Net -605 ml       Physical Exam  Vitals and nursing note reviewed.   Constitutional:       General: She is not in acute distress.     Appearance: Normal appearance. She is obese. She is not ill-appearing or toxic-appearing.   HENT:      Head: Normocephalic and atraumatic.      Mouth/Throat:      Mouth: Mucous membranes are moist.   Eyes:      General: No scleral icterus.     Conjunctiva/sclera: Conjunctivae normal.   Cardiovascular:      Rate and Rhythm: Normal rate and regular rhythm.      Pulses: Normal pulses.      Heart sounds: Normal heart sounds. No murmur heard.    No gallop.   Pulmonary:      Effort: Pulmonary effort is normal. No respiratory distress.      Breath sounds: Wheezing present. No rhonchi or rales.   Abdominal:      General: Abdomen is flat. There is no distension.      Palpations: Abdomen is soft.      Tenderness: There is no abdominal tenderness. There is no right CVA tenderness, left CVA tenderness or guarding.   Musculoskeletal:      Right lower leg: No edema.      Left lower leg: No edema.   Skin:     General: Skin is warm and dry.      Capillary Refill: Capillary refill takes less than 2 seconds.      Coloration: Skin is not jaundiced.   Neurological:      General: No focal deficit present.      Mental Status: She is alert and oriented to person, place, and time. Mental status is at baseline.       Vents:       Lines/Drains/Airways       Drain  Duration                  Chest Tube 09/12/22 1713 1 Left Pleural 8 Fr. <1 day                    Significant Labs:    CBC/Anemia  [id number]     UNIT NUMBER [id number]     DISPENSE STATUS Issued     PRODUCT ID Red Blood Cells     PRODUCT CODE [id number]     PRODUCT DESCRIPTION RBC AS-1 LR     CROSSMATCH RESULT Compatible     ISSUE DATE/TIME [id number]    TYPE/SCREEN    Collection Time: 09/14/20  8:53 AM   Result Value Ref Range    ABO/RH(D) O Rh Positive     ANTIBODY SCREEN Negative     TYPE AND SCREEN EXPIRATION DATE 09/17/2020 23:59        Ct Nb Soft Tissue Or Organs    Result Date: 9/11/2020  Narrative: CT SOFT TISSUE ANGIO ABD AORTA ILIOFEM JONEL W LG RNOFF NB NON-CARDIOVASCULAR INTERPRETATION COMPARISON: CT urogram 1/3/2020. CTA Runoff 7/31/2017. FINDINGS: Lower Chest: Mild centrilobular emphysema. Right middle lobe calcified granuloma. Limited by arterial phase acquisition. Liver/Biliary: Normal. Gallbladder: Contains layering sludge. Pancreas: Stable 1.0 cm hypoattenuating lesion within the pancreatic tail (5/23). Spleen: Normal. Kidneys: Bilateral Bosniak type I renal cysts. Adrenals: Normal. Bowel: Normal. Appendectomy. Mesentery/Retroperitoneum: Normal. Pelvis: Markedly enlarged prostate. Davison catheter within the urinary bladder. Bones/Soft Tissues: No suspicious lesion. Lower Extremities: Unremarkable.     Impression: IMPRESSION: This is an interpretation of the non-cardiovascular findings. Please see separate primary report for cardiovascular findings. 1.  Gallbladder sludge. 2.  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 3.  Marked prostate enlargement.    Unresulted Labs (From admission, onward)     Start     Ordered    09/14/20 0827  TYPE/SCREEN  ONE TIME,   Routine      09/14/20 0827    09/14/20 0827  Prepare Red Blood Cells: 1 Units  Blood - Once,   TD      09/14/20 0827              Unresulted Procedure (From admission, onward)    None        Microbiology:  Microbiology Results     None          Edmund Galicia MD.  FACP.  Hospitalist  [facility]  Profile:  Recent Labs   Lab 09/12/22  0925   WBC 8.94   HGB 9.8*   HCT 29.2*      MCV 92   RDW 14.3       Significant Imaging:   I have reviewed all pertinent imaging results/findings within the past 24 hours.    Assessment/Plan:     * Pneumothorax after biopsy  Pneumothorax after CT guided biopsy of new JASSI nodule on 9/12. IR placed chest tube at that time. Pulmonology consulted for chest tube management. Pt complianing of pain at site. Tube set to water seal and without leak or significant output, dressing dry clean and in place.  - Repeat CXR today  - Optimize pain management   - Follow up CXR today, if lung not expanded fully, set tube to suction. If lung expanded fully, clamp tube and repeat CXR tomorrow.           Thank you for your consult. I will follow-up with patient. Please contact us if you have any additional questions.     Armand Gillis MD  Pulmonology  [facility] - Med Surg     [facility]  9/14/2020 8:29 AM

## 2022-09-13 NOTE — H&P
[facility] Medicine  History & Physical    Patient Name: Nalini Varma  MRN: [id number]  Patient Class: IP- Inpatient  Admission Date: 9/12/2022  Attending Physician: Sylvester Arias MD   Primary Care Provider: Yane Sahni MD         Patient information was obtained from patient and ER records.     Subjective:     Principal Problem:Pneumothorax after biopsy    Chief Complaint: No chief complaint on file.       HPI: Ms. Nalini Varma is a 71 y.o. female with a PMHx of CHF, HTN, CKD, COPD (3L at home), RUL lung adenocarcinoma s/p radiation and new JASSI nodule was admitted after a pneumothorax occurred while getting a biopsy of the JASSI nodule. Patient states that she was at her baseline health before the biopsy was obtained. After a pneumothorax was discovered she had a chest tube placed by IR. Patient states she is in pain from the procedure but it is manageable. She is breathing comfortably on her home O2 level (3L). Denies any use of BiPAP or CPAP at home.       Past Medical History:   Diagnosis Date    Anxiety     Cervical spinal stenosis     Choledocholithiasis     Chronic obstructive pulmonary disease 03/16/2016    Degenerative disc disease of cervical spine     Diverticulosis 04/24/2018    History of alcohol abuse 03/02/2021    History of gastric ulcer     History of L3 compression fracture 12/19/2018    History of lung cancer     s/p completion of XRT in 10/2020    Hyperlipidemia     Iron deficiency anemia     Osteoarthritis     Stage III CKD 2017       Past Surgical History:   Procedure Laterality Date    BREAST CYST EXCISION Right     1967    CATARACT EXTRACTION      COLONOSCOPY  2015    COLONOSCOPY N/A 6/8/2022    Procedure: COLONOSCOPY;  Surgeon: Helio Cheema MD;  Location: [facility] ([address] [place] [address]);  Service: Endoscopy;  Laterality: N/A;  5/25-Pt requesting Dr. Cheema-approval given per Dr. Cheema-ml  Fully vaccinated, prep instr portal -ml    CORONARY  ANGIOGRAPHY N/A 7/20/2018    Procedure: ANGIOGRAM, CORONARY ARTERY;  Surgeon: Willard Roberts MD;  Location: [facility] CATH LAB;  Service: Cardiovascular;  Laterality: N/A;    ENDOSCOPIC ULTRASOUND OF UPPER GASTROINTESTINAL TRACT N/A 3/24/2021    Procedure: ULTRASOUND, UPPER GI TRACT, ENDOSCOPIC;  Surgeon: Helio Cheema MD;  [facility] ([facility] F[facility]);  Service: Endoscopy;  Laterality: N/A;    ENDOSCOPIC ULTRASOUND OF UPPER GASTROINTESTINAL TRACT N/A 4/25/2022    Procedure: ULTRASOUND, UPPER GI TRACT, ENDOSCOPIC;  Surgeon: Helio Cheema MD;  Location: [facility] (2ND FLR);  Service: Endoscopy;  Laterality: N/A;  cardiac risk assessment 1 per Dr. Ortez. see t/e 3/17-SC  3/25:new instructions via portal. home with medical transport?-SC    ERCP N/A 3/24/2021    Procedure: ERCP (ENDOSCOPIC RETROGRADE CHOLANGIOPANCREATOGRAPHY);  Surgeon: Helio Cheema MD;  Location: [facility] (2ND FLR);  Service: Endoscopy;  Laterality: N/A;    ERCP N/A 4/30/2021    Procedure: ERCP (ENDOSCOPIC RETROGRADE CHOLANGIOPANCREATOGRAPHY);  Surgeon: Boo Gray MD;  Location: [facility] (2ND FLR);  Service: Endoscopy;  Laterality: N/A;    ERCP N/A 7/1/2021    Procedure: ERCP (ENDOSCOPIC RETROGRADE CHOLANGIOPANCREATOGRAPHY);  Surgeon: Helio Cheema MD;  Location: [facility] (2ND F[facility]);  Service: Endoscopy;  Laterality: N/A;  Ok for Taxi. Dr Cheema  rapid covid 1130am- tb inst email    ESOPHAGOGASTRODUODENOSCOPY  2015    ESOPHAGOGASTRODUODENOSCOPY N/A 3/4/2021    Procedure: EGD (ESOPHAGOGASTRODUODENOSCOPY);  Surgeon: Yenifer Ramirez MD;  Location: [facility] ENDO;  Service: Endoscopy;  Laterality: N/A;    ESOPHAGOGASTRODUODENOSCOPY N/A 3/24/2021    Procedure: EGD (ESOPHAGOGASTRODUODENOSCOPY);  Surgeon: Helio Cheema MD;  Location: [facility] (2ND FLR);  Service: Endoscopy;  Laterality: N/A;    EYE SURGERY  2016    Cataracts    FRACTURE SURGERY  2014    JOINT REPLACEMENT  2007    ORIF FEMUR FRACTURE Left     TOTAL  KNEE ARTHROPLASTY Left 2007    TUBAL LIGATION         Review of patient's allergies indicates:   Allergen Reactions    Wellbutrin [bupropion hcl] Other (See Comments)     Hyponatremia and hypomagnesia     Zoloft [sertraline] Other (See Comments)     Hyponatremia and hypomagnesia     Bananas [banana]      Emesis, and stomach cramps       Current Facility-Administered Medications on File Prior to Encounter   Medication    0.9%  NaCl infusion    fentaNYL 50 mcg/mL injection    fentaNYL 50 mcg/mL injection    hydrALAZINE injection    LIDOcaine (PF) 10 mg/ml (1%) injection 10 mg    LIDOcaine HCL 10 mg/ml (1%) injection    midazolam (VERSED) 1 mg/mL injection    oxyCODONE-acetaminophen (PERCOCET) 5-325 mg 5-325 mg per tablet    [COMPLETED] oxyCODONE-acetaminophen 5-325 mg per tablet 1 tablet     Current Outpatient Medications on File Prior to Encounter   Medication Sig    ALBUTEROL INHL Inhale into the lungs.    atorvastatin (LIPITOR) 40 MG tablet TAKE 1 TABLET BY MOUTH EVERY DAILY    azelastine (ASTELIN) 137 mcg (0.1 %) nasal spray 1 spray (137 mcg total) by Nasal route 2 (two) times daily.    b complex vitamins capsule Take 1 capsule by mouth once daily.    calcium carbonate (OS-SANDRINE) 500 mg calcium (1,250 mg) tablet Take 2 tablets (1,000 mg total) by mouth 2 (two) times daily.    clotrimazole-betamethasone 1-0.05% (LOTRISONE) cream Apply to affected area 2 times daily    cyanocobalamin, vitamin B-12, 1,000 mcg TbSR Take 1,000 mcg by mouth once daily.    denosumab (PROLIA) 60 mg/mL Syrg Inject 1 mL (60 mg total) into the skin every 6 (six) months.    DULoxetine (CYMBALTA) 60 MG capsule Take 1 capsule (60 mg total) by mouth once daily.    fluticasone (FLONASE) 50 mcg/actuation nasal spray 1 spray by Each Nare route 2 (two) times daily as needed for Rhinitis.    fluticasone propion-salmeterol 115-21 mcg/dose (ADVAIR HFA) 115-21 mcg/actuation HFAA inhaler Inhale 2 puffs into the lungs every 12  (twelve) hours.    fluticasone propion-salmeterol 115-21 mcg/dose (ADVAIR HFA) 115-21 mcg/actuation HFAA inhaler Inhale 2 puffs into the lungs.    gabapentin (NEURONTIN) 300 MG capsule Take 1 capsule (300 mg total) by mouth 3 (three) times daily.    guaiFENesin (MUCINEX) 600 mg 12 hr tablet Take 1,200 mg by mouth 2 (two) times daily as needed for Congestion.    HYDROcodone-acetaminophen (NORCO)  mg per tablet Take 1 tablet by mouth every 12 (twelve) hours as needed for Pain.    HYDROcodone-acetaminophen (NORCO)  mg per tablet Take 1 tablet by mouth.    losartan (COZAAR) 50 MG tablet Take 2 tablets (100 mg total) by mouth once daily.    magnesium oxide (MAG-OX) 400 mg (241.3 mg magnesium) tablet Take 1 tablet by mouth every 12 (twelve) hours.    metoprolol succinate (TOPROL-XL) 25 MG 24 hr tablet Take 2 tablets (50 mg total) by mouth once daily.    metoprolol tartrate (LOPRESSOR) 50 MG tablet Take 50 mg by mouth.    multivit-min/iron/folic/lutein (CENTRUM SILVER WOMEN ORAL) Take 1 tablet by mouth once daily.    ondansetron (ZOFRAN) 4 MG tablet Take 8 mg by mouth every 8 (eight) hours as needed.    pantoprazole (PROTONIX) 40 MG tablet Take 1 tablet (40 mg total) by mouth once daily.    potassium chloride (MICRO-K) 10 MEQ CpSR Take 10 mEq by mouth.    sars-cov-2, covid-19, (MODERNA COVID-19) 50 mcg/0.25 ml injection (BOOSTER) Inject into the muscle.    torsemide (DEMADEX) 20 MG Tab Take 1 tablet (20 mg total) by mouth once daily.    traZODone (DESYREL) 100 MG tablet Take 1 tablet (100 mg total) by mouth every evening.    traZODone (DESYREL) 50 MG tablet Take 1 tablet (50 mg total) by mouth nightly as needed for Insomnia.    umeclidinium (INCRUSE ELLIPTA) 62.5 mcg/actuation inhalation capsule Inhale 1 puff into the lungs once daily. Controller    umeclidinium bromide (INCRUSE ELLIPTA INHL) Inhale into the lungs.    vitamin D (VITAMIN D3) 1000 units Tab Take 1 tablet (1,000 Units total) by  mouth once daily.    ZINC ORAL Take by mouth.    [DISCONTINUED] albuterol (VENTOLIN HFA) 90 mcg/actuation inhaler inhale 1-2 puffs as needed every 6 hours for wheezing and shortness of breath (Patient taking differently: inhale 1-2 puffs as needed every 6 hours for wheezing and shortness of breath)    [DISCONTINUED] aspirin (ECOTRIN) 81 MG EC tablet Take 81 mg by mouth.     Family History       Problem Relation (Age of Onset)    Alzheimer's disease Mother    Arthritis Father, Brother    Cancer Sister    Colon cancer Brother    Dementia Mother    Depression Mother    Hypertension Mother, Brother    Miscarriages / Stillbirths Sister    Myasthenia gravis Father    No Known Problems Son    Rectal cancer Father          Tobacco Use    Smoking status: Every Day     Packs/day: 1.00     Years: 50.00     Pack years: 50.00     Types: Cigarettes    Smokeless tobacco: Never   Substance and Sexual Activity    Alcohol use: Yes     Alcohol/week: 7.0 standard drinks     Types: 7 Shots of liquor per week     Comment: at least 1 cocktail a day    Drug use: No    Sexual activity: Not Currently     Birth control/protection: Abstinence     Review of Systems   Constitutional:  Negative for chills and fever.   HENT:  Negative for congestion and sore throat.    Eyes:  Negative for pain.   Respiratory:  Positive for shortness of breath. Negative for cough.    Cardiovascular:  Negative for chest pain, palpitations and leg swelling.   Gastrointestinal:  Negative for abdominal pain, constipation, diarrhea, nausea and vomiting.   Genitourinary:  Negative for difficulty urinating, dysuria and hematuria.   Musculoskeletal:  Negative for back pain.   Skin:  Negative for rash.   Neurological:  Negative for light-headedness and headaches.   Hematological:  Does not bruise/bleed easily.   Psychiatric/Behavioral:  Negative for agitation.    Objective:     Vital Signs (Most Recent):    Vital Signs (24h Range):  Temp:  [97 °F (36.1 °C)-99 °F  (37.2 °C)] 97 °F (36.1 °C)  Pulse:  [79-95] 79  Resp:  [15-22] 17  SpO2:  [95 %-100 %] 100 %  BP: (131-191)/(66-90) 178/76        There is no height or weight on file to calculate BMI.    Physical Exam  Constitutional:       Appearance: Normal appearance.   HENT:      Head: Normocephalic and atraumatic.      Mouth/Throat:      Mouth: Mucous membranes are moist.   Eyes:      Extraocular Movements: Extraocular movements intact.      Pupils: Pupils are equal, round, and reactive to light.   Cardiovascular:      Rate and Rhythm: Normal rate and regular rhythm.      Pulses: Normal pulses.      Heart sounds: Normal heart sounds.   Pulmonary:      Effort: Pulmonary effort is normal. No respiratory distress.      Breath sounds: Normal breath sounds.      Comments: Breath sounds present in all left lung zones. Mild wheezing heard throughout. Chest tube present with minimal SS output.   Abdominal:      General: Abdomen is flat. There is no distension.      Palpations: Abdomen is soft.      Tenderness: There is no abdominal tenderness.   Musculoskeletal:         General: Normal range of motion.      Cervical back: Normal range of motion and neck supple.      Right lower leg: No edema.      Left lower leg: No edema.   Skin:     General: Skin is warm.   Neurological:      General: No focal deficit present.      Mental Status: She is alert and oriented to person, place, and time.   Psychiatric:         Mood and Affect: Mood normal.         Behavior: Behavior normal.         CRANIAL NERVES     CN III, IV, VI   Pupils are equal, round, and reactive to light.     Significant Labs: All pertinent labs within the past 24 hours have been reviewed.    Significant Imaging: I have reviewed all pertinent imaging results/findings within the past 24 hours.    Assessment/Plan:     * Pneumothorax after biopsy  Patient with a pneumothorax after biopsy of JASSI nodule. Patient has a chest tube placed and is currently breathing comfortably on her  home O2 level of 3L. Has some pain but it is manageable.     - Chest tube in place, minimal SS output    - Supplemental O2   - Defer to IR for management of chest tube   - Continue home norco for pain control   - FU with oncologist outpatient to discuss       Chronic combined systolic and diastolic CHF (congestive heart failure)  - Continue home metoprolol, losartan, and torsemide       Generalized anxiety disorder  - Continue home cymbalta       Hyperlipidemia  - Continue home statin       Essential hypertension  BP currently elevated, likely due to pain     - Continue home losartan       Chronic obstructive pulmonary disease  - Continue home inhalers       VTE Risk Mitigation (From admission, onward)         Ordered     enoxaparin injection 40 mg  Daily         09/12/22 2000     IP VTE HIGH RISK PATIENT  Once         09/12/22 2000     Place sequential compression device  Until discontinued         09/12/22 2000                   Avery Peck MD  Department of Hospital Medicine   [facility]

## 2022-09-13 NOTE — PLAN OF CARE
[facility] - Med Surg  Initial Discharge Assessment       Primary Care Provider: Yane Sahni MD    Admission Diagnosis: Pneumohemothorax [J94.2]  Pneumothorax after biopsy [J95.811]    Admission Date: 9/12/2022  Expected Discharge Date:     Discharge Barriers Identified: None    Payor: [employer]ED [employer]ARE / Plan: [employer]URE [employer] / Product Type: Medicare Advantage /     Extended Emergency Contact Information  Primary Emergency Contact: [place] [address] [place]  Mobile Phone: [phone]  Relation: Son  Secondary Emergency Contact: Romain Dietrich           [facility]  Home Phone: [phone]  Mobile Phone: [phone]  Relation: Son    Discharge Plan A: Home, Home Health  Discharge Plan B: Home      [facility]  [address]  [place] [address]  Phone: [phone] Fax: [phone]    [facility]/pharmacy #[id number] [place], [place] - [address]  [address] [address]  Phone: [phone] Fax: [phone]    [facility]  [address] [facility] [address]  Phone: [phone] Fax: [phone]      Initial Assessment (most recent)       Adult Discharge Assessment - 09/13/22 1456          Discharge Assessment    Assessment Type Discharge Planning Assessment     Confirmed/corrected address, phone number and insurance Yes     Confirmed Demographics Correct on Facesheet     Source of Information patient     When was your last doctors appointment? 09/21/22     Communicated FELICIANO with patient/caregiver Date not available/Unable to determine     Reason For Admission pneumothorax after biopsy     Lives With alone     Do you expect to return to your current living situation? Yes     Do you have help at home or someone to help you manage your care at home? No     Prior to hospitilization cognitive status: Alert/Oriented     Current cognitive status:  Alert/Oriented     Walking or Climbing Stairs Difficulty ambulation difficulty, requires equipment     Mobility Management Pt uses the following DME at home:  wheel chair, and rollator     Dressing/Bathing Difficulty none     Home Accessibility wheelchair accessible     Equipment Currently Used at Home rollator;oxygen;wheelchair;bath bench;other (see comments)   Blood Pressure Monitoring, and Pulse ox.    Readmission within 30 days? No     Patient currently being followed by outpatient case management? No     Do you currently have service(s) that help you manage your care at home? No     Do you take prescription medications? Yes     Do you have prescription coverage? Yes     Coverage [employer]     Do you have any problems affording any of your prescribed medications? No     Is the patient taking medications as prescribed? yes     Who is going to help you get home at discharge? Pt will need transportation at the time of discharge.     How do you get to doctors appointments? health plan transportation     Are you on dialysis? No     Do you take coumadin? No     Discharge Plan A Home;Home Health     Discharge Plan B Home     DME Needed Upon Discharge  rollator   Pt would like a new rollator at the time of discharge.    Discharge Plan discussed with: Patient     Discharge Barriers Identified None                      SW met with pt and completed initial discharge assessment.  No hospital readmission within the last 30 days.  Pt will need transportation home.      Pt lives alone in an apartment on the 3rd floor.  Pt has elevator access.  Pt reported she requires DME assistance mobility. Pt reported she uses the following DME:  wheel chair, bath bench, rollator, wheel chair, BP machine, portable O2, concentrator, and Pulse ox.  Pt uses 3 liters of O2.     Pt does not receive coumadin or dialysis.  Pt reported she does not receive behavioral health services.  Pt has used [facility]  in the past.    Paige Blanca LMSW  PRN-  [facility]  Ext. [phone]

## 2022-09-13 NOTE — HPI
Ms. Nalini Varma is a 71 y.o. female with a PMHx of CHF, HTN, CKD, COPD (3L at home), RUL lung adenocarcinoma s/p radiation and new JASSI nodule was admitted after a pneumothorax occurred while getting a biopsy of the JASSI nodule. Patient states that she was at her baseline health before the biopsy was obtained. After a pneumothorax was discovered she had a chest tube placed by IR. Patient states she is in pain from the procedure but it is manageable. She is breathing comfortably on her home O2 level (3L). Denies any use of BiPAP or CPAP at home.

## 2022-09-13 NOTE — CONSULTS
Consult acknowledged please refer to note on 9/13 for assessment and plan.     Chest tube clamped at stopcock. Plan to repeat CXR in AM. If pt tolerates, will remove tube tomorrow.

## 2022-09-13 NOTE — ASSESSMENT & PLAN NOTE
Patient with a pneumothorax after biopsy of JASSI nodule. Patient has a chest tube placed and is currently breathing comfortably on her home O2 level of 3L. Has some pain but it is manageable.     - Chest tube in place, minimal SS output    - Supplemental O2   - Defer to IR for management of chest tube   - Continue home norco for pain control   - FU with oncologist outpatient to discuss

## 2022-09-13 NOTE — SUBJECTIVE & OBJECTIVE
Past Medical History:   Diagnosis Date    Anxiety     Cervical spinal stenosis     Choledocholithiasis     Chronic obstructive pulmonary disease 03/16/2016    Degenerative disc disease of cervical spine     Diverticulosis 04/24/2018    History of alcohol abuse 03/02/2021    History of gastric ulcer     History of L3 compression fracture 12/19/2018    History of lung cancer     s/p completion of XRT in 10/2020    Hyperlipidemia     Iron deficiency anemia     Osteoarthritis     Stage III CKD 2017       Past Surgical History:   Procedure Laterality Date    BREAST CYST EXCISION Right     1967    CATARACT EXTRACTION      COLONOSCOPY  2015    COLONOSCOPY N/A 6/8/2022    Procedure: COLONOSCOPY;  Surgeon: Helio Cheema MD;  Location: [facility] ([address] [place] [address]);  Service: Endoscopy;  Laterality: N/A;  5/25-Pt requesting Dr. Cheema-approval given per Dr. Cheema-ml  Fully vaccinated, prep instr portal -ml    CORONARY ANGIOGRAPHY N/A 7/20/2018    Procedure: ANGIOGRAM, CORONARY ARTERY;  Surgeon: Willard Roberts MD;  Location: [facility] CATH LAB;  Service: Cardiovascular;  Laterality: N/A;    ENDOSCOPIC ULTRASOUND OF UPPER GASTROINTESTINAL TRACT N/A 3/24/2021    Procedure: ULTRASOUND, UPPER GI TRACT, ENDOSCOPIC;  Surgeon: Helio Cheema MD;  Location: [address]);  Service: Endoscopy;  Laterality: N/A;    ENDOSCOPIC ULTRASOUND OF UPPER GASTROINTESTINAL TRACT N/A 4/25/2022    Procedure: ULTRASOUND, UPPER GI TRACT, ENDOSCOPIC;  Surgeon: Helio Cheema MD;  [address]  Service: Endoscopy;  Laterality: N/A;  cardiac risk assessment 1 per Dr. Ortez. see t/e 3/17-SC  3/25:new instructions via portal. home with medical transport?-SC    ERCP N/A 3/24/2021    Procedure: ERCP (ENDOSCOPIC RETROGRADE CHOLANGIOPANCREATOGRAPHY);  Surgeon: Helio Cheema MD;  [facility]  Service: Endoscopy;  Laterality: N/A;    ERCP N/A 4/30/2021    Procedure: ERCP (ENDOSCOPIC RETROGRADE  CHOLANGIOPANCREATOGRAPHY);  Surgeon: Boo Gray MD;  [facility]);  Service: Endoscopy;  Laterality: N/A;    ERCP N/A 7/1/2021    Procedure: ERCP (ENDOSCOPIC RETROGRADE CHOLANGIOPANCREATOGRAPHY);  Surgeon: Helio Cheema MD;  [facility] (2[facility] F[facility]);  Service: Endoscopy;  Laterality: N/A;  Ok for Taxi. Dr Cheema  rapid covid 1130am- tb inst email    ESOPHAGOGASTRODUODENOSCOPY  2015    ESOPHAGOGASTRODUODENOSCOPY N/A 3/4/2021    Procedure: EGD (ESOPHAGOGASTRODUODENOSCOPY);  Surgeon: Yenifer Ramirez MD;  Location: [facility];  Service: Endoscopy;  Laterality: N/A;    ESOPHAGOGASTRODUODENOSCOPY N/A 3/24/2021    Procedure: EGD (ESOPHAGOGASTRODUODENOSCOPY);  Surgeon: Helio Cheema MD;  Location: [facility] (2ND FLR);  Service: Endoscopy;  Laterality: N/A;    EYE SURGERY  2016    Cataracts    FRACTURE SURGERY  2014    JOINT REPLACEMENT  2007    ORIF FEMUR FRACTURE Left     TOTAL KNEE ARTHROPLASTY Left 2007    TUBAL LIGATION         Review of patient's allergies indicates:   Allergen Reactions    Wellbutrin [bupropion hcl] Other (See Comments)     Hyponatremia and hypomagnesia     Zoloft [sertraline] Other (See Comments)     Hyponatremia and hypomagnesia     Bananas [banana]      Emesis, and stomach cramps       Family History       Problem Relation (Age of Onset)    Alzheimer's disease Mother    Arthritis Father, Brother    Cancer Sister    Colon cancer Brother    Dementia Mother    Depression Mother    Hypertension Mother, Brother    Miscarriages / Stillbirths Sister    Myasthenia gravis Father    No Known Problems Son    Rectal cancer Father          Tobacco Use    Smoking status: Every Day     Packs/day: 1.00     Years: 50.00     Pack years: 50.00     Types: Cigarettes    Smokeless tobacco: Never   Substance and Sexual Activity    Alcohol use: Yes     Alcohol/week: 7.0 standard drinks     Types: 7 Shots of liquor per week     Comment: at least 1 cocktail a day    Drug use: No     Sexual activity: Not Currently     Birth control/protection: Abstinence         Review of Systems   Constitutional:  Negative for chills and fever.   HENT:  Negative for congestion and sore throat.    Respiratory:  Positive for shortness of breath and wheezing. Negative for cough.    Cardiovascular:  Positive for chest pain. Negative for palpitations and leg swelling.   Gastrointestinal:  Negative for abdominal distention, abdominal pain, blood in stool, constipation, diarrhea, nausea and vomiting.   Genitourinary:  Negative for dysuria, flank pain and hematuria.   Neurological:  Negative for dizziness, light-headedness and headaches.   Objective:     Vital Signs (Most Recent):  Temp: 98.5 °F (36.9 °C) (09/13/22 0754)  Pulse: 80 (09/13/22 0906)  Resp: 14 (09/13/22 0906)  BP: (!) 149/70 (09/13/22 0903)  SpO2: 98 % (09/13/22 0754)   Vital Signs (24h Range):  Temp:  [97 °F (36.1 °C)-98.5 °F (36.9 °C)] 98.5 °F (36.9 °C)  Pulse:  [77-93] 80  Resp:  [14-22] 16  SpO2:  [95 %-100 %] 98 %  BP: (131-192)/(66-90) 149/70     Weight: 104.5 kg (230 lb 6.1 oz)  Body mass index is 37.18 kg/m².      Intake/Output Summary (Last 24 hours) at 9/13/2022 1151  Last data filed at 9/13/2022 0600  Gross per 24 hour   Intake 200 ml   Output 805 ml   Net -605 ml       Physical Exam  Vitals and nursing note reviewed.   Constitutional:       General: She is not in acute distress.     Appearance: Normal appearance. She is obese. She is not ill-appearing or toxic-appearing.   HENT:      Head: Normocephalic and atraumatic.      Mouth/Throat:      Mouth: Mucous membranes are moist.   Eyes:      General: No scleral icterus.     Conjunctiva/sclera: Conjunctivae normal.   Cardiovascular:      Rate and Rhythm: Normal rate and regular rhythm.      Pulses: Normal pulses.      Heart sounds: Normal heart sounds. No murmur heard.    No gallop.   Pulmonary:      Effort: Pulmonary effort is normal. No respiratory distress.      Breath sounds: Wheezing  present. No rhonchi or rales.   Abdominal:      General: Abdomen is flat. There is no distension.      Palpations: Abdomen is soft.      Tenderness: There is no abdominal tenderness. There is no right CVA tenderness, left CVA tenderness or guarding.   Musculoskeletal:      Right lower leg: No edema.      Left lower leg: No edema.   Skin:     General: Skin is warm and dry.      Capillary Refill: Capillary refill takes less than 2 seconds.      Coloration: Skin is not jaundiced.   Neurological:      General: No focal deficit present.      Mental Status: She is alert and oriented to person, place, and time. Mental status is at baseline.       Vents:       Lines/Drains/Airways       Drain  Duration                  Chest Tube 09/12/22 1713 1 Left Pleural 8 Fr. <1 day                    Significant Labs:    CBC/Anemia Profile:  Recent Labs   Lab 09/12/22  0925   WBC 8.94   HGB 9.8*   HCT 29.2*      MCV 92   RDW 14.3       Significant Imaging:   I have reviewed all pertinent imaging results/findings within the past 24 hours.

## 2022-09-13 NOTE — PROGRESS NOTES
[facility] Medicine  Progress Note    Patient Name: Nalini Varma  MRN: [id number]  Patient Class: IP- Inpatient   Admission Date: 9/12/2022  Length of Stay: 1 days  Attending Physician: Sylvester Arias MD  Primary Care Provider: Yane Sahni MD        Subjective:     Principal Problem:Pneumothorax after biopsy        HPI:  Ms. Nalini Varma is a 71 y.o. female with a PMHx of CHF, HTN, CKD, COPD (3L at home), RUL lung adenocarcinoma s/p radiation and new JASSI nodule was admitted after a pneumothorax occurred while getting a biopsy of the JASSI nodule. Patient states that she was at her baseline health before the biopsy was obtained. After a pneumothorax was discovered she had a chest tube placed by IR. Patient states she is in pain from the procedure but it is manageable. She is breathing comfortably on her home O2 level (3L). Denies any use of BiPAP or CPAP at home.       Overview/Hospital Course:  Pt direct admit to C after developing L sided pneumothorax s/p lung biopsy for JASSI nodule. Pt in moderate amount of pain from procedure but is clinically stable. Pulmonology consulted for chest tube management. Repeat CXR shows resolution of PTX, Pulm will clamp chest tube and see how pt responds.       Interval History: NAEO. Chest tube in place with no purulence or erythema. Pulm is following for chest tube management. Pulm recently clamped chest tube and will see how pt tolerates in the AM. Will get repeat CXR. Hopeful for discharge tomorrow.    Review of Systems   Constitutional:  Negative for chills and fever.   HENT:  Negative for congestion and sore throat.    Eyes:  Negative for pain.   Respiratory:  Negative for cough and shortness of breath.    Cardiovascular:  Negative for chest pain, palpitations and leg swelling.   Gastrointestinal:  Negative for abdominal pain, constipation, diarrhea, nausea and vomiting.   Genitourinary:  Negative for difficulty urinating, dysuria and  hematuria.   Musculoskeletal:  Negative for back pain.   Skin:  Negative for rash.   Neurological:  Negative for light-headedness and headaches.   Hematological:  Does not bruise/bleed easily.   Psychiatric/Behavioral:  Negative for agitation.    Objective:     Vital Signs (Most Recent):  Temp: 97.8 °F (36.6 °C) (09/13/22 1214)  Pulse: 77 (09/13/22 1214)  Resp: 18 (09/13/22 1214)  BP: 138/63 (09/13/22 1214)  SpO2: 100 % (09/13/22 1214) Vital Signs (24h Range):  Temp:  [97 °F (36.1 °C)-98.5 °F (36.9 °C)] 97.8 °F (36.6 °C)  Pulse:  [77-92] 77  Resp:  [14-22] 18  SpO2:  [95 %-100 %] 100 %  BP: (131-192)/(63-90) 138/63     Weight: 104.5 kg (230 lb 6.1 oz)  Body mass index is 37.18 kg/m².    Intake/Output Summary (Last 24 hours) at 9/13/2022 1343  Last data filed at 9/13/2022 0600  Gross per 24 hour   Intake 200 ml   Output 805 ml   Net -605 ml      Physical Exam  Vitals and nursing note reviewed.   Constitutional:       General: She is not in acute distress.     Appearance: Normal appearance. She is obese. She is not ill-appearing or diaphoretic.   HENT:      Head: Normocephalic and atraumatic.      Mouth/Throat:      Mouth: Mucous membranes are moist.   Eyes:      Extraocular Movements: Extraocular movements intact.      Pupils: Pupils are equal, round, and reactive to light.   Cardiovascular:      Rate and Rhythm: Normal rate and regular rhythm.      Pulses: Normal pulses.      Heart sounds: Normal heart sounds. No murmur heard.    No gallop.   Pulmonary:      Effort: Pulmonary effort is normal. No respiratory distress.      Breath sounds: Normal breath sounds.      Comments: Breath sounds present in all left lung zones. Mild wheezing heard throughout. Chest tube present with minimal SS output.   Abdominal:      General: Abdomen is flat. There is no distension.      Palpations: Abdomen is soft.      Tenderness: There is no abdominal tenderness.   Musculoskeletal:         General: Normal range of motion.      Cervical  back: Normal range of motion and neck supple.      Right lower leg: No edema.      Left lower leg: No edema.   Skin:     General: Skin is warm.      Capillary Refill: Capillary refill takes less than 2 seconds.   Neurological:      General: No focal deficit present.      Mental Status: She is alert and oriented to person, place, and time.   Psychiatric:         Mood and Affect: Mood normal.         Behavior: Behavior normal.       Significant Labs: All pertinent labs within the past 24 hours have been reviewed.    Significant Imaging: I have reviewed all pertinent imaging results/findings within the past 24 hours.      Assessment/Plan:      * Pneumothorax after biopsy  Patient with a pneumothorax after biopsy of JASSI nodule. Patient has a chest tube placed and is currently breathing comfortably on her home O2 level of 3L. Has some pain but it is manageable.     - Chest tube in place, minimal SS output    - Supplemental O2   - Defer to IR for management of chest tube   - Continue home norco for pain control PRN q 6 hours  - FU with oncologist outpatient to discuss       Chronic combined systolic and diastolic CHF (congestive heart failure)  - Continue home metoprolol, losartan, and torsemide       Generalized anxiety disorder  - Continue home cymbalta       Hyperlipidemia  - Continue home statin       Essential hypertension  BP currently elevated, likely due to pain     - Continue home losartan       Chronic obstructive pulmonary disease  - Continue home inhalers         VTE Risk Mitigation (From admission, onward)         Ordered     enoxaparin injection 40 mg  Daily         09/12/22 2000     IP VTE HIGH RISK PATIENT  Once         09/12/22 2000     Place sequential compression device  Until discontinued         09/12/22 2000                Discharge Planning   FELICIANO:      Code Status: Full Code   Is the patient medically ready for discharge?:     Reason for patient still in hospital (select all that apply): Patient  trending condition             Mark Horton DO  Department of Hospital Medicine   [facility]

## 2022-09-13 NOTE — HPI
Ms Nalini Varma is a 71 y.o. F with pmhx CHF (EF 38%), HTN, arthritis, CKD, COPD (3L home oxygen, >50 pack years smoking history, RUL adenocarcinoma s/p radiation therapy, and new JASSI nodule. She is s/p chemotherapy & radiation therapy to the RUL. Ms Varma underwent IR CT lung biopsy of the new JASSI lung nodule 9/12 but suffered a pneumothorax after the procedure. She then had a chest tube placed to treat the pneumo. Pulmonology was consulted to manage the chest tube.    Today Ms Varma reports feeling ok, she is short of breath but on her home O2 of 3L. She reports some chest pain at the tube site. On exam her chest tube is set to water seal, without air leak, and the tube site is clean and dry. She denies any other symptoms.

## 2022-09-13 NOTE — HOSPITAL COURSE
Patient was a direct admit to [facility] after developing L sided pneumothorax s/p lung biopsy for JASSI nodule. IR placed chest tube and pulmonology was consulted for chest tube management. Repeat CXR shows resolution of pneumothorax and chest tube was removed. Patient was on her home O2 level (3L) during the whole hospitalization. Patient has a follow-up scheduled with her oncologist to discuss the results from the biopsy.     Physical Exam  Vitals and nursing note reviewed.   Constitutional:       General: She is not in acute distress.     Appearance: Normal appearance. She is not ill-appearing or toxic-appearing.   HENT:      Head: Normocephalic and atraumatic.      Mouth/Throat:      Mouth: Mucous membranes are moist.   Eyes:      General: No scleral icterus.     Conjunctiva/sclera: Conjunctivae normal.   Cardiovascular:      Rate and Rhythm: Normal rate and regular rhythm.      Pulses: Normal pulses.      Heart sounds: Normal heart sounds. No murmur heard.    No gallop.   Pulmonary:      Effort: Pulmonary effort is normal. No respiratory distress.      Breath sounds: Wheezing (chronic issue) present. No rhonchi or rales.   Abdominal:      General: Abdomen is flat. There is no distension.      Palpations: Abdomen is soft.      Tenderness: There is no abdominal tenderness. There is no right CVA tenderness, left CVA tenderness or guarding.   Musculoskeletal:      Right lower leg: No edema.      Left lower leg: No edema.   Skin:     General: Skin is warm and dry.      Capillary Refill: Capillary refill takes less than 2 seconds.      Coloration: Skin is not jaundiced.   Neurological:      General: No focal deficit present.      Mental Status: She is alert and oriented to person, place, and time. Mental status is at baseline.

## 2022-09-14 VITALS
DIASTOLIC BLOOD PRESSURE: 59 MMHG | WEIGHT: 230.38 LBS | HEIGHT: 66 IN | HEART RATE: 78 BPM | TEMPERATURE: 98 F | SYSTOLIC BLOOD PRESSURE: 132 MMHG | BODY MASS INDEX: 37.03 KG/M2 | OXYGEN SATURATION: 98 % | RESPIRATION RATE: 18 BRPM

## 2022-09-14 PROCEDURE — 94761 N-INVAS EAR/PLS OXIMETRY MLT: CPT

## 2022-09-14 PROCEDURE — 27000221 HC OXYGEN, UP TO 24 HOURS

## 2022-09-14 PROCEDURE — 25000003 PHARM REV CODE 250

## 2022-09-14 PROCEDURE — 99239 HOSP IP/OBS DSCHRG MGMT >30: CPT | Mod: GC,,, | Performed by: STUDENT IN AN ORGANIZED HEALTH CARE EDUCATION/TRAINING PROGRAM

## 2022-09-14 PROCEDURE — 99239 PR HOSPITAL DISCHARGE DAY,>30 MIN: ICD-10-PCS | Mod: GC,,, | Performed by: STUDENT IN AN ORGANIZED HEALTH CARE EDUCATION/TRAINING PROGRAM

## 2022-09-14 PROCEDURE — 99231 PR SUBSEQUENT HOSPITAL CARE,LEVL I: ICD-10-PCS | Mod: GC,,, | Performed by: INTERNAL MEDICINE

## 2022-09-14 PROCEDURE — 25000003 PHARM REV CODE 250: Performed by: STUDENT IN AN ORGANIZED HEALTH CARE EDUCATION/TRAINING PROGRAM

## 2022-09-14 PROCEDURE — 99231 SBSQ HOSP IP/OBS SF/LOW 25: CPT | Mod: GC,,, | Performed by: INTERNAL MEDICINE

## 2022-09-14 PROCEDURE — 94640 AIRWAY INHALATION TREATMENT: CPT

## 2022-09-14 RX ORDER — ALBUTEROL SULFATE 90 UG/1
AEROSOL, METERED RESPIRATORY (INHALATION)
Qty: 25.5 G | Refills: 11
Start: 2022-09-14 | End: 2023-02-02 | Stop reason: SDUPTHER

## 2022-09-14 RX ADMIN — GABAPENTIN 300 MG: 300 CAPSULE ORAL at 09:09

## 2022-09-14 RX ADMIN — LOSARTAN POTASSIUM 100 MG: 50 TABLET, FILM COATED ORAL at 09:09

## 2022-09-14 RX ADMIN — PANTOPRAZOLE SODIUM 40 MG: 40 TABLET, DELAYED RELEASE ORAL at 09:09

## 2022-09-14 RX ADMIN — HYDROCODONE BITARTRATE AND ACETAMINOPHEN 1 TABLET: 10; 325 TABLET ORAL at 12:09

## 2022-09-14 RX ADMIN — DULOXETINE 60 MG: 60 CAPSULE, DELAYED RELEASE ORAL at 09:09

## 2022-09-14 RX ADMIN — GABAPENTIN 300 MG: 300 CAPSULE ORAL at 02:09

## 2022-09-14 RX ADMIN — METOPROLOL SUCCINATE 50 MG: 50 TABLET, EXTENDED RELEASE ORAL at 09:09

## 2022-09-14 RX ADMIN — TORSEMIDE 20 MG: 20 TABLET ORAL at 09:09

## 2022-09-14 RX ADMIN — TIOTROPIUM BROMIDE INHALATION SPRAY 2 PUFF: 3.12 SPRAY, METERED RESPIRATORY (INHALATION) at 07:09

## 2022-09-14 RX ADMIN — HYDROCODONE BITARTRATE AND ACETAMINOPHEN 1 TABLET: 10; 325 TABLET ORAL at 06:09

## 2022-09-14 RX ADMIN — FLUTICASONE FUROATE AND VILANTEROL TRIFENATATE 1 PUFF: 100; 25 POWDER RESPIRATORY (INHALATION) at 07:09

## 2022-09-14 NOTE — PROGRESS NOTES
[facility]  Pulmonology  Progress Note    Patient Name: Nalini Varma  MRN: [id number]  Admission Date: 9/12/2022  Hospital Length of Stay: 2 days  Code Status: Full Code  Attending Provider: Sylvester Arias MD  Primary Care Provider: Yane Sahni MD   Principal Problem: Pneumothorax after biopsy    Subjective:     Interval History: Chest tube removed today. Ms Varma tolerated the procedure well although she reports some pain at the site. Her breathing has remained stable. No sign of pneumothorax at this time.     Objective:     Vital Signs (Most Recent):  Temp: 97.7 °F (36.5 °C) (09/14/22 0740)  Pulse: 78 (09/14/22 0743)  Resp: 18 (09/14/22 0743)  BP: (!) 126/56 (09/14/22 0904)  SpO2: 95 % (09/14/22 0743)   Vital Signs (24h Range):  Temp:  [97.6 °F (36.4 °C)-98.8 °F (37.1 °C)] 97.7 °F (36.5 °C)  Pulse:  [73-78] 78  Resp:  [14-20] 18  SpO2:  [95 %-100 %] 95 %  BP: (126-143)/(56-63) 126/56     Weight: 104.5 kg (230 lb 6.1 oz)  Body mass index is 37.18 kg/m².      Intake/Output Summary (Last 24 hours) at 9/14/2022 1030  Last data filed at 9/14/2022 0700  Gross per 24 hour   Intake 200 ml   Output 1450 ml   Net -1250 ml       Physical Exam  Vitals and nursing note reviewed.   Constitutional:       General: She is not in acute distress.     Appearance: Normal appearance. She is obese. She is not ill-appearing or toxic-appearing.   HENT:      Head: Normocephalic and atraumatic.      Mouth/Throat:      Mouth: Mucous membranes are moist.   Eyes:      General: No scleral icterus.     Conjunctiva/sclera: Conjunctivae normal.   Cardiovascular:      Rate and Rhythm: Normal rate and regular rhythm.      Pulses: Normal pulses.      Heart sounds: Normal heart sounds. No murmur heard.    No gallop.   Pulmonary:      Effort: Pulmonary effort is normal. No respiratory distress.      Breath sounds: Wheezing present. No rhonchi or rales.   Abdominal:      General: Abdomen is flat. There is no distension.       Palpations: Abdomen is soft.      Tenderness: There is no abdominal tenderness. There is no right CVA tenderness, left CVA tenderness or guarding.   Musculoskeletal:      Right lower leg: No edema.      Left lower leg: No edema.   Skin:     General: Skin is warm and dry.      Capillary Refill: Capillary refill takes less than 2 seconds.      Coloration: Skin is not jaundiced.   Neurological:      General: No focal deficit present.      Mental Status: She is alert and oriented to person, place, and time. Mental status is at baseline.       Vents:  Oxygen Concentration (%): 32 (09/14/22 0743)    Lines/Drains/Airways       Drain  Duration                  Chest Tube 09/12/22 1713 1 Left Pleural 8 Fr. 1 day                    Significant Labs:    CBC/Anemia Profile:  No results for input(s): WBC, HGB, HCT, PLT, MCV, RDW, IRON, FERRITIN, RETIC, FOLATE, [id number], OCCULTBLOOD in the last 48 hours.     Chemistries:  Recent Labs   Lab 09/13/22  1142   *   K 3.8   CL 95   CO2 30*   BUN 29*   CREATININE 0.8   CALCIUM 8.6*       All pertinent labs within the past 24 hours have been reviewed.    Significant Imaging:  I have reviewed all pertinent imaging results/findings within the past 24 hours.    Assessment/Plan:     * Pneumothorax after biopsy  Pneumothorax after CT guided biopsy of new JASSI nodule on 9/12. IR placed chest tube at that time. Pulmonology consulted for chest tube management. Tube now removed. Pt stable, tolerated procedure well. No sign of pneumothorax at this time.  - Pt given strict return precautions  - Stable for d/c                  Armand Gillis MD  Pulmonology  [facility]

## 2022-09-14 NOTE — DISCHARGE SUMMARY
[facility]  Discharge Summary      Patient Name: Nalini Varma  MRN: [id number]  Patient Class: IP- Inpatient  Admission Date: 9/12/2022  Hospital Length of Stay: 2 days  Discharge Date and Time:  09/14/2022 12:35 PM  Attending Physician: Sylvester Arias MD   Discharging Provider: Avery Peck MD  Primary Care Provider: Yane Sahni MD  [facility] Medicine Team: [facility] 1 Avery Peck MD    HPI:   Ms. Nalini Varma is a 71 y.o. female with a PMHx of CHF, HTN, CKD, COPD (3L at home), RUL lung adenocarcinoma s/p radiation and new JASSI nodule was admitted after a pneumothorax occurred while getting a biopsy of the JASSI nodule. Patient states that she was at her baseline health before the biopsy was obtained. After a pneumothorax was discovered she had a chest tube placed by IR. Patient states she is in pain from the procedure but it is manageable. She is breathing comfortably on her home O2 level (3L). Denies any use of BiPAP or CPAP at home.       * No surgery found *      Hospital Course:   Patient was a direct admit to [facility] after developing L sided pneumothorax s/p lung biopsy for JASSI nodule. IR placed chest tube and pulmonology was consulted for chest tube management. Repeat CXR shows resolution of pneumothorax and chest tube was removed. Patient was on her home O2 level (3L) during the whole hospitalization. Patient has a follow-up scheduled with her oncologist to discuss the results from the biopsy.     Physical Exam  Vitals and nursing note reviewed.   Constitutional:       General: She is not in acute distress.     Appearance: Normal appearance. She is not ill-appearing or toxic-appearing.   HENT:      Head: Normocephalic and atraumatic.      Mouth/Throat:      Mouth: Mucous membranes are moist.   Eyes:      General: No scleral icterus.     Conjunctiva/sclera: Conjunctivae normal.   Cardiovascular:      Rate and Rhythm: Normal rate and regular rhythm.      Pulses:  Normal pulses.      Heart sounds: Normal heart sounds. No murmur heard.    No gallop.   Pulmonary:      Effort: Pulmonary effort is normal. No respiratory distress.      Breath sounds: Wheezing (chronic issue) present. No rhonchi or rales.   Abdominal:      General: Abdomen is flat. There is no distension.      Palpations: Abdomen is soft.      Tenderness: There is no abdominal tenderness. There is no right CVA tenderness, left CVA tenderness or guarding.   Musculoskeletal:      Right lower leg: No edema.      Left lower leg: No edema.   Skin:     General: Skin is warm and dry.      Capillary Refill: Capillary refill takes less than 2 seconds.      Coloration: Skin is not jaundiced.   Neurological:      General: No focal deficit present.      Mental Status: She is alert and oriented to person, place, and time. Mental status is at baseline.        Goals of Care Treatment Preferences:  Code Status: Full Code      Consults:   Consults (From admission, onward)        Status Ordering Provider     Inpatient consult to Pulmonology  Once        Provider:  (Not yet assigned)    Completed RADHA BAILEY          No new Assessment & Plan notes have been filed under this hospital service since the last note was generated.  Service: Hospital Medicine    Final Active Diagnoses:    Diagnosis Date Noted POA    PRINCIPAL PROBLEM:  Pneumothorax after biopsy [J95.811] 09/12/2022 Unknown    Chronic combined systolic and diastolic CHF (congestive heart failure) [I50.42] 09/17/2018 Yes     Chronic    Generalized anxiety disorder [F41.1] 11/14/2016 Yes    Hyperlipidemia [E78.5] 09/20/2016 Yes     Chronic    Essential hypertension [I10] 03/16/2016 Yes     Chronic    Chronic obstructive pulmonary disease [J44.9] 03/16/2016 Yes      Problems Resolved During this Admission:       Discharged Condition: stable    Disposition: Home or Self Care    Follow Up:    Patient Instructions:      WALKER FOR HOME USE     Order Specific Question  "Answer Comments   Type of Walker: Rollator with brakes and/or seat    With wheels? Yes    Height: 5' 6" (1.676 m)    Weight: 104.5 kg (230 lb 6.1 oz)    Length of need (1-99 months): 99    Does patient have medical equipment at home? rollator Blood Pressure Monitoring, and Pulse ox. / Blood Pressure Monitoring, and Pulse ox. / Blood Pressure Monitoring, and Pulse ox. / Blood Pressure Monitoring, and Pulse ox. / Blood Pressure Monitoring, and Pulse ox.   Does patient have medical equipment at home? oxygen    Does patient have medical equipment at home? wheelchair    Does patient have medical equipment at home? bath bench    Does patient have medical equipment at home? other (see comments)    Please check all that apply: Patient is unable to safely ambulate without equipment.    Please check all that apply: Patient's condition impairs ambulation.        Significant Diagnostic Studies: Labs: All labs within the past 24 hours have been reviewed    Pending Diagnostic Studies:     None         Medications:  Reconciled Home Medications:      Medication List      CHANGE how you take these medications    ADVAIR -21 mcg/actuation Hfaa inhaler  Generic drug: fluticasone propion-salmeterol 115-21 mcg/dose  Inhale 2 puffs into the lungs every 12 (twelve) hours.  What changed: Another medication with the same name was removed. Continue taking this medication, and follow the directions you see here.     HYDROcodone-acetaminophen  mg per tablet  Commonly known as: NORCO  Take 1 tablet by mouth every 12 (twelve) hours as needed for Pain.  What changed: Another medication with the same name was removed. Continue taking this medication, and follow the directions you see here.        CONTINUE taking these medications    albuterol 90 mcg/actuation inhaler  Commonly known as: VENTOLIN HFA  inhale 1-2 puffs as needed every 6 hours for wheezing and shortness of breath     atorvastatin 40 MG tablet  Commonly known as: " LIPITOR  TAKE 1 TABLET BY MOUTH EVERY DAILY     azelastine 137 mcg (0.1 %) nasal spray  Commonly known as: ASTELIN  1 spray (137 mcg total) by Nasal route 2 (two) times daily.     b complex vitamins capsule  Take 1 capsule by mouth once daily.     CENTRUM SILVER WOMEN ORAL  Take 1 tablet by mouth once daily.     clotrimazole-betamethasone 1-0.05% cream  Commonly known as: LOTRISONE  Apply to affected area 2 times daily     cyanocobalamin (vitamin B-12) 1,000 mcg Tbsr  Take 1,000 mcg by mouth once daily.     DULoxetine 60 MG capsule  Commonly known as: CYMBALTA  Take 1 capsule (60 mg total) by mouth once daily.     fluticasone propionate 50 mcg/actuation nasal spray  Commonly known as: FLONASE  1 spray by Each Nare route 2 (two) times daily as needed for Rhinitis.     gabapentin 300 MG capsule  Commonly known as: NEURONTIN  Take 1 capsule (300 mg total) by mouth 3 (three) times daily.     guaiFENesin 600 mg 12 hr tablet  Commonly known as: MUCINEX  Take 1,200 mg by mouth 2 (two) times daily as needed for Congestion.     * INCRUSE ELLIPTA 62.5 mcg/actuation inhalation capsule  Generic drug: umeclidinium  Inhale 1 puff into the lungs once daily. Controller     * INCRUSE ELLIPTA INHL  Inhale into the lungs.     losartan 50 MG tablet  Commonly known as: COZAAR  Take 2 tablets (100 mg total) by mouth once daily.     magnesium oxide 400 mg (241.3 mg magnesium) tablet  Commonly known as: MAG-OX  Take 1 tablet by mouth every 12 (twelve) hours.     metoprolol succinate 25 MG 24 hr tablet  Commonly known as: TOPROL-XL  Take 2 tablets (50 mg total) by mouth once daily.     ondansetron 4 MG tablet  Commonly known as: ZOFRAN  Take 8 mg by mouth every 8 (eight) hours as needed.     OYSTER SHELL CALCIUM 500 500 mg calcium (1,250 mg) tablet  Generic drug: calcium carbonate  Take 2 tablets (1,000 mg total) by mouth 2 (two) times daily.     pantoprazole 40 MG tablet  Commonly known as: PROTONIX  Take 1 tablet (40 mg total) by mouth  once daily.     potassium chloride 10 MEQ Cpsr  Commonly known as: MICRO-K  Take 10 mEq by mouth.     PROLIA 60 mg/mL Syrg  Generic drug: denosumab  Inject 1 mL (60 mg total) into the skin every 6 (six) months.     torsemide 20 MG Tab  Commonly known as: DEMADEX  Take 1 tablet (20 mg total) by mouth once daily.     * traZODone 50 MG tablet  Commonly known as: DESYREL  Take 1 tablet (50 mg total) by mouth nightly as needed for Insomnia.     * traZODone 100 MG tablet  Commonly known as: DESYREL  Take 1 tablet (100 mg total) by mouth every evening.     vitamin D 1000 units Tab  Commonly known as: VITAMIN D3  Take 1 tablet (1,000 Units total) by mouth once daily.     ZINC ORAL  Take by mouth.         * This list has 4 medication(s) that are the same as other medications prescribed for you. Read the directions carefully, and ask your doctor or other care provider to review them with you.            STOP taking these medications    ALBUTEROL INHL     metoprolol tartrate 50 MG tablet  Commonly known as: LOPRESSOR     sars-cov-2 (covid-19) 50 mcg/0.25 ml injection (BOOSTER)  Commonly known as: MODERNA COVID-19            Indwelling Lines/Drains at time of discharge:   Lines/Drains/Airways     Drain  Duration                Chest Tube 09/12/22 1713 1 Left Pleural 8 Fr. Removed on 9/14/22 1 day                Time spent on the discharge of patient: 35 minutes         Avery Peck MD  Department of Hospital Medicine  [facility]

## 2022-09-14 NOTE — SUBJECTIVE & OBJECTIVE
Interval History: Chest tube removed today. Ms Varma tolerated the procedure well although she reports some pain at the site. Her breathing has remained stable. No sign of pneumothorax at this time.     Objective:     Vital Signs (Most Recent):  Temp: 97.7 °F (36.5 °C) (09/14/22 0740)  Pulse: 78 (09/14/22 0743)  Resp: 18 (09/14/22 0743)  BP: (!) 126/56 (09/14/22 0904)  SpO2: 95 % (09/14/22 0743)   Vital Signs (24h Range):  Temp:  [97.6 °F (36.4 °C)-98.8 °F (37.1 °C)] 97.7 °F (36.5 °C)  Pulse:  [73-78] 78  Resp:  [14-20] 18  SpO2:  [95 %-100 %] 95 %  BP: (126-143)/(56-63) 126/56     Weight: 104.5 kg (230 lb 6.1 oz)  Body mass index is 37.18 kg/m².      Intake/Output Summary (Last 24 hours) at 9/14/2022 1030  Last data filed at 9/14/2022 0700  Gross per 24 hour   Intake 200 ml   Output 1450 ml   Net -1250 ml       Physical Exam  Vitals and nursing note reviewed.   Constitutional:       General: She is not in acute distress.     Appearance: Normal appearance. She is obese. She is not ill-appearing or toxic-appearing.   HENT:      Head: Normocephalic and atraumatic.      Mouth/Throat:      Mouth: Mucous membranes are moist.   Eyes:      General: No scleral icterus.     Conjunctiva/sclera: Conjunctivae normal.   Cardiovascular:      Rate and Rhythm: Normal rate and regular rhythm.      Pulses: Normal pulses.      Heart sounds: Normal heart sounds. No murmur heard.    No gallop.   Pulmonary:      Effort: Pulmonary effort is normal. No respiratory distress.      Breath sounds: Wheezing present. No rhonchi or rales.   Abdominal:      General: Abdomen is flat. There is no distension.      Palpations: Abdomen is soft.      Tenderness: There is no abdominal tenderness. There is no right CVA tenderness, left CVA tenderness or guarding.   Musculoskeletal:      Right lower leg: No edema.      Left lower leg: No edema.   Skin:     General: Skin is warm and dry.      Capillary Refill: Capillary refill takes less than 2 seconds.       Coloration: Skin is not jaundiced.   Neurological:      General: No focal deficit present.      Mental Status: She is alert and oriented to person, place, and time. Mental status is at baseline.       Vents:  Oxygen Concentration (%): 32 (09/14/22 0743)    Lines/Drains/Airways       Drain  Duration                  Chest Tube 09/12/22 1713 1 Left Pleural 8 Fr. 1 day                    Significant Labs:    CBC/Anemia Profile:  No results for input(s): WBC, HGB, HCT, PLT, MCV, RDW, IRON, FERRITIN, RETIC, FOLATE, BAGDYTHQ44, OCCULTBLOOD in the last 48 hours.     Chemistries:  Recent Labs   Lab 09/13/22  1142   *   K 3.8   CL 95   CO2 30*   BUN 29*   CREATININE 0.8   CALCIUM 8.6*       All pertinent labs within the past 24 hours have been reviewed.    Significant Imaging:  I have reviewed all pertinent imaging results/findings within the past 24 hours.

## 2022-09-14 NOTE — ASSESSMENT & PLAN NOTE
Pneumothorax after CT guided biopsy of new JASSI nodule on 9/12. IR placed chest tube at that time. Pulmonology consulted for chest tube management. Tube now removed. Pt stable, tolerated procedure well. No sign of pneumothorax at this time.  - Pt given strict return precautions  - Stable for d/c

## 2022-09-14 NOTE — PLAN OF CARE
Pt will discharge to home with no needs.  MAURY met with pt and advised pt rollator not covered by insurance due to pt received WC in 2019 and will not qualify until 2024.      MAURY scheduled d/c transportation to home through [facility]. Patient is scheduled to be picked up at 3:45p.m.  SW is in communication with patient's CM and patient's Care Team.  Pt will discharge via wheel chair van/3 liters.      4:59 PM  SW spoke to [place] with [facility] ext [phone] to confirm transportation and was advised ETA 4:45p.m. but may be delayed due to traffic.  MAURY notified pt of above information.      aPige Blanca LMSW  PRN-  [facility]  Ext. [phone]

## 2022-09-14 NOTE — PLAN OF CARE
Problem: Adult Inpatient Plan of Care  Goal: Plan of Care Review  Outcome: Met  Goal: Patient-Specific Goal (Individualized)  Outcome: Met  Goal: Absence of Hospital-Acquired Illness or Injury  Outcome: Met  Goal: Optimal Comfort and Wellbeing  Outcome: Met  Goal: Readiness for Transition of Care  Outcome: Met     Problem: Impaired Wound Healing  Goal: Optimal Wound Healing  Outcome: Met     Problem: Fall Injury Risk  Goal: Absence of Fall and Fall-Related Injury  Outcome: Met     Problem: Respiratory Compromise (Pneumothorax)  Goal: Optimal Oxygenation and Ventilation  Outcome: Met  D/C instructions reviewed, questions answered and copy of instructions given to patient who verbalized understanding of instructions.  Assisted to get dressed and into personal w/c.  Techs assisting patient expressed concern of patients ability to transfer herself in her home setting without assistance. When nursing questioned patient she stated her apartment is for the elderly and there are pull cords for assistance in the apartment and she has neighbors and friends available to assist her whenever needed.  She is confident that she is ready to go home and has the assistance needed in place.  Waiting for [facility] W/C transport service.

## 2022-09-15 ENCOUNTER — PATIENT MESSAGE (OUTPATIENT)
Dept: INTERNAL MEDICINE | Facility: CLINIC | Age: 72
End: 2022-09-15
Payer: MEDICARE

## 2022-09-15 ENCOUNTER — TELEPHONE (OUTPATIENT)
Dept: RADIATION ONCOLOGY | Facility: CLINIC | Age: 72
End: 2022-09-15
Payer: MEDICARE

## 2022-09-15 ENCOUNTER — PATIENT OUTREACH (OUTPATIENT)
Dept: ADMINISTRATIVE | Facility: CLINIC | Age: 72
End: 2022-09-15
Payer: MEDICARE

## 2022-09-15 DIAGNOSIS — M17.0 PRIMARY OSTEOARTHRITIS OF BOTH KNEES: ICD-10-CM

## 2022-09-15 DIAGNOSIS — Z85.118 HISTORY OF LUNG CANCER: Primary | ICD-10-CM

## 2022-09-15 DIAGNOSIS — G89.4 CHRONIC PAIN SYNDROME: ICD-10-CM

## 2022-09-15 RX ORDER — HYDROCODONE BITARTRATE AND ACETAMINOPHEN 10; 325 MG/1; MG/1
1 TABLET ORAL EVERY 12 HOURS PRN
Qty: 60 TABLET | Refills: 0 | Status: SHIPPED | OUTPATIENT
Start: 2022-09-15 | End: 2022-10-14 | Stop reason: SDUPTHER

## 2022-09-15 NOTE — TELEPHONE ENCOUNTER
No new care gaps identified.  [facility] Embedded Care Gaps. Reference number: [id number]. 9/15/2022   6:54:04 AM JERRODT

## 2022-09-15 NOTE — TELEPHONE ENCOUNTER
Reviewed chart and will review labs with her at upcoming appointment - will hold off on ordering labs until can discuss in clinic recent results   Please thank her for me for the update

## 2022-09-15 NOTE — PLAN OF CARE
[facility] Med S[facility]  Discharge Final Note    Primary Care Provider: Yane Sahni MD    Expected Discharge Date: 9/14/2022    SW discharged home with no needs.  Pt requested rollator, not covered through her insurance plan due to pt received a WC in 2019.  WC transport scheduled to home.  3 liters.      Final Discharge Note (most recent)       Final Note - 09/15/22 1014          Final Note    Assessment Type Final Discharge Note     Anticipated Discharge Disposition Home or Self Care     Hospital Resources/Appts/Education Provided Appointments scheduled and added to AVS        Post-Acute Status    Post-Acute Authorization Other     Other Status No Post-Acute Service Needs     Discharge Delays None known at this time                     Important Message from Medicare             Future Appointments   Date Time Provider Department Center   9/19/2022 11:45 AM NON-CHEMO 01 [facility] CHEMO [facility]   9/21/2022 11:15 AM [facility] MAMMO1 [facility] MAMMO [facility]   9/21/2022 12:00 PM Yane Sahni MD [facility] IM [facility]   9/29/2022  9:30 AM Ann Gilmore MD [facility] HEM ONC [facility]   10/12/2022 11:00 AM Kary Torres NP [facility] UROGYN [facility]   10/14/2022 12:00 PM JESE [facility] PSYCH Lj Blanca LMSW  PRN-  [facility]  Ext. [phone]

## 2022-09-15 NOTE — TELEPHONE ENCOUNTER
I called patient to notify her that the lung biopsy was non-diagnostic. She developed a pneumothorax requiring chest tube, so I do not recommend putting her through another attempt. Prior review at the Thoracic Multidisciplinary Tumor Conference with input from Pulmonology did not believe her disease was accessible by bronch/EBUS.     I plan to obtain repeat CT Chest in 6 weeks to allows for changes related to biopsy/pneumothorax to clear. Will discuss her case at the next thoracic conference 9/21 to get input on whether concurrent therapy would be offered or if we should proceed with empiric radiation therapy alone.

## 2022-09-16 ENCOUNTER — PATIENT MESSAGE (OUTPATIENT)
Dept: INTERNAL MEDICINE | Facility: CLINIC | Age: 72
End: 2022-09-16
Payer: MEDICARE

## 2022-09-16 NOTE — TELEPHONE ENCOUNTER
Spoke w/ pt and corrected her appt    [Normal Growth] : growth [Normal Development] : development [None] : No known medical problems [No Elimination Concerns] : elimination [No Feeding Concerns] : feeding [No Skin Concerns] : skin [Normal Sleep Pattern] : sleep [Assessment of Language Development] : assessment of language development [Temperament and Behavior] : temperament and behavior [Toilet Training] : toilet training [TV Viewing] : tv viewing [Safety] : safety [No Medications] : ~He/She~ is not on any medications [Parent/Guardian] : parent/guardian [] : The components of the vaccine(s) to be administered today are listed in the plan of care. The disease(s) for which the vaccine(s) are intended to prevent and the risks have been discussed with the caretaker.  The risks are also included in the appropriate vaccination information statements which have been provided to the patient's caregiver.  The caregiver has given consent to vaccinate. [FreeTextEntry1] : Discussed and/or provided information on the following:\par LANGUAGE: How child communicates; expectations for language\par BEHAVIOR: Sensitivity, approachability, adaptability, intensity\par TOILET TRAINING: What have parents tried; techniques; personal hygiene\par TELEVISION VIEWING: Limits on viewing; promotion of reading; promotion of physical activity and safe play\par SAFETY: Car seats; parental use of safety belts; bike helmets; outdoor safety; guns\par DIET ADVICE: Eating a balanced diet, iron absorption, avoiding excessive sweets and junk food\par PHYSICAL ACTIVITY AND SPORTS WAS DISCUSSED\par

## 2022-09-19 NOTE — PHYSICIAN QUERY
PT Name: Nalini Varma  MR #: [id number]    DOCUMENTATION CLARIFICATION     CDS: Stephan Matamoros RN CCDS               Contact information: [email or web address]      This form is a permanent document in the medical record.     Query Date: September 19, 2022    Dear Provider,  By submitting this query, we are merely seeking further clarification of documentation. Please utilize your independent clinical judgment when addressing the question(s) below.    The medical record contains the following:  Supporting Clinical Findings Location in Medical Record   Pneumothorax after biopsy    Patient underwent biopsy of left upper lobe nodule yesterday and developed pneumothorax.  Sent here for IR chest tube placement, placed on the evening of 9/12.  Chest x-ray today shows resolution of pneumothorax.  Patient seen by pulmonology.  Chest tube clamped.  Plan to remove tomorrow if no reaccumulation of pneumothorax.    currently breathing comfortably on her home O2 level of 3L. Has some pain but it is manageable.   PMHx of COPD (3L at home), RUL lung adenocarcinoma s/p radiation and new JASSI nodule    Pneumothorax after biopsy    She is s/p chemotherapy & radiation therapy to the RUL. Ms Varma underwent IR CT lung biopsy of the new JASSI lung nodule 9/12 but suffered a pneumothorax after the procedure.  Today Ms Varma reports feeling ok, she is short of breath but on her home O2 of 3L.    >50 pack years smoking history    CXR this AM shows good expansion of the L lung with pleural catheter clamped since yesterday afternoon.  She reports stable respiratory symptoms.  Catheter removed.  OK for d/c home from pulmonary perspective.      Pre-procedure diagnosis: Lung nodule  Post-procedure diagnosis: Same    Complications: No immediate complications.    TECHNIQUE:  - Percutaneous CT -guided coaxial core needle biopsy    Number of core specimens: 2, one of the biopsies was predominately blood products.  Upon repeat scanning a small  amount of hemorrhage had decreased the visibility significantly in the field.  Additionally, the patient began to have a difficult time remaining comfortable in the prone position and not moving.  The decision was made to end the procedure.    Impression:   Technically difficult image-guided biopsy of left upper lung nodule    Pathology results with small fragment and not enough specimen for evaluation/diagnosis.  Recommend short term follow up vs repeat biopsy with anesthesia support, will defer to referring provider.    Post biopsy changes in the left upper lobe. slightly linear lucency in the region of the biopsy which could represent a pneumothorax, attention on follow-up. Predominantly linear coarse opacities in the region of the medial aspect of the right lung which may represent atelectasis/scarring. 9/12/2022 H&P                    9/13/2022 Pulmonology consult                9/14/2022 Pulmonology progress notes        9/12/2022 IR biopsy report                                        9/12/2022 Chest x-ray       Please clarify if Pneumothorax  (as it relates to Percutaneous CT -guided coaxial core needle biopsy) is:      [x   ] Complication of the procedure   [   ] Present, but not a complication of the procedure   [   ] Other (please specify): __________________   [  ] Clinically Undetermined       Please document in your progress notes daily for the duration of treatment until resolved and include in your discharge summary.

## 2022-09-20 ENCOUNTER — DOCUMENTATION ONLY (OUTPATIENT)
Dept: CARDIOTHORACIC SURGERY | Facility: CLINIC | Age: 72
End: 2022-09-20
Payer: MEDICARE

## 2022-09-20 ENCOUNTER — PATIENT MESSAGE (OUTPATIENT)
Dept: INTERNAL MEDICINE | Facility: CLINIC | Age: 72
End: 2022-09-20
Payer: MEDICARE

## 2022-09-20 DIAGNOSIS — I10 HYPERTENSION, BENIGN: ICD-10-CM

## 2022-09-20 RX ORDER — METOPROLOL SUCCINATE 25 MG/1
50 TABLET, EXTENDED RELEASE ORAL DAILY
Qty: 60 TABLET | Refills: 11 | Status: ON HOLD | OUTPATIENT
Start: 2022-09-20 | End: 2023-02-20 | Stop reason: SDUPTHER

## 2022-09-20 NOTE — PATIENT CARE CONFERENCE
[facility]      THORACIC MULTIDISCIPLINARY TUMOR BOARD  PATIENT REVIEW FORM  ________________________________________________________________________    CLINIC #: [id number]  DATE: 09/20/2022    DIAGNOSIS:   NSCLC    PRESENTER:   Dr. Mead     PATIENT SUMMARY:   71 y.o. y/o female with likely synchronous primary Stage IA2 (cT1b cN0 M0) RUL NSCLC (adeno; no actionable mutations) diagnosed on biopsy of the spiculated nodule 9/14/20. In addition there was a slightly more superior, pleural-based lesion that increased in size and solidity. She was not a candidate for resection due to COPD and CHF. She completed SBRT 55 Gy in 5 fractions to both RUL nodules on 11/4/20. No late toxicity from treatment. CT Chest 7/1/22 demonstrates interval enlargement of BENNY nodule to 1.0 cm; the other two nodules in BENNY are <0.4 cm and stable. PET Benny SUV 1.3 and mediastinal adenopathy 2.3. At last MDC discussed systemic therapy options with potential radiation to nodule and adenopathy if not a systemic therapy candidate. Underwent percutaneous biopsy however complicated by PTX requiring chest tube placement. Nondiagnostic.     BOARD RECOMMENDATIONS:   Agree with plan for repeat chest CT. If persistent BENNY and left hilar adenopathy consider robotic bronchoscopy at that time. Concern for N2 disease. Pending biopsy likely plan for chemoRT.     CONSULT NEEDED:     [] Surgery    [x] Hem/Onc    [x] Rad/Onc    [] Dietary                 [] Social Service    [] Psychology       [x] Pulmonology    Clinical Stage: Tumor 1b Node(s) 2 Metastasis   Pathologic Stage: Tumor  Node(s)  Metastasis     GROUP STAGE:     [] O    [] 1A    [] IB    [] IIA    [] IIB     [x] IIIA     [] IIIB     [] IIIC    [] IV                               [] Local recurrence     [x] Regional recurrence     [] Distant recurrence                   [x] NSCLC     [] SCLC     Tumor type     Unstageable:      [] Yes     [] No  Metastatic site(s):          [x] Claritza'l  Treatment Guidelines reviewed and care planned is consistent with guidelines.         (i.e., NCCN, NCI, PD, ACO, AUA, etc.)    PRESENTATION AT CANCER CONFERENCE:         [] Prospective    [] Retrospective     [] Follow-Up          [] Eligible for clinical trial

## 2022-09-20 NOTE — TELEPHONE ENCOUNTER
No new care gaps identified.  [facility] Embedded Care Gaps. Reference number: [id number]. 9/20/2022   2:01:09 PM CDT

## 2022-09-20 NOTE — TELEPHONE ENCOUNTER
Refill Routing Note   Medication(s) are not appropriate for processing by [facility] for the following reason(s):      - Patient has been seen in the ED/Hospital since the last PCP visit    [facility] action(s):  Defer Medication-related problems identified: Requires appointment     Medication Therapy Plan: Seen in the ED on 9/12/22  for debility  Medication reconciliation completed: No     Appointments  past 12m or future 3m with PCP    Date Provider   Last Visit   4/29/2022 Yane Sahni MD   Next Visit   9/21/2022 Yane Sahni MD   ED visits in past 90 days: 0        Note composed:2:08 PM 09/20/2022

## 2022-09-21 ENCOUNTER — INFUSION (OUTPATIENT)
Dept: INFUSION THERAPY | Facility: OTHER | Age: 72
End: 2022-09-21
Attending: INTERNAL MEDICINE
Payer: MEDICARE

## 2022-09-21 ENCOUNTER — OFFICE VISIT (OUTPATIENT)
Dept: INTERNAL MEDICINE | Facility: CLINIC | Age: 72
End: 2022-09-21
Attending: INTERNAL MEDICINE
Payer: MEDICARE

## 2022-09-21 ENCOUNTER — HOSPITAL ENCOUNTER (OUTPATIENT)
Dept: RADIOLOGY | Facility: OTHER | Age: 72
Discharge: HOME | End: 2022-09-21
Attending: INTERNAL MEDICINE
Payer: MEDICARE

## 2022-09-21 VITALS
SYSTOLIC BLOOD PRESSURE: 143 MMHG | OXYGEN SATURATION: 100 % | DIASTOLIC BLOOD PRESSURE: 68 MMHG | HEART RATE: 78 BPM | RESPIRATION RATE: 17 BRPM | TEMPERATURE: 97 F

## 2022-09-21 DIAGNOSIS — F33.2 MDD (MAJOR DEPRESSIVE DISORDER), RECURRENT SEVERE, WITHOUT PSYCHOSIS: ICD-10-CM

## 2022-09-21 DIAGNOSIS — F17.200 TOBACCO DEPENDENCE: ICD-10-CM

## 2022-09-21 DIAGNOSIS — E87.1 HYPONATREMIA: ICD-10-CM

## 2022-09-21 DIAGNOSIS — S32.030D COMPRESSION FRACTURE OF L3 VERTEBRA WITH ROUTINE HEALING: ICD-10-CM

## 2022-09-21 DIAGNOSIS — Z85.118 HISTORY OF LUNG CANCER: ICD-10-CM

## 2022-09-21 DIAGNOSIS — E83.42 HYPOMAGNESEMIA: ICD-10-CM

## 2022-09-21 DIAGNOSIS — Z12.31 ENCOUNTER FOR SCREENING MAMMOGRAM FOR MALIGNANT NEOPLASM OF BREAST: ICD-10-CM

## 2022-09-21 DIAGNOSIS — G99.2 MYELOPATHY IN DISEASES CLASSIFIED ELSEWHERE: ICD-10-CM

## 2022-09-21 DIAGNOSIS — F41.1 GENERALIZED ANXIETY DISORDER: ICD-10-CM

## 2022-09-21 DIAGNOSIS — M79.671 RIGHT FOOT PAIN: Primary | ICD-10-CM

## 2022-09-21 DIAGNOSIS — D50.0 IRON DEFICIENCY ANEMIA DUE TO CHRONIC BLOOD LOSS: Primary | ICD-10-CM

## 2022-09-21 DIAGNOSIS — M80.00XD AGE-RELATED OSTEOPOROSIS WITH CURRENT PATHOLOGICAL FRACTURE WITH ROUTINE HEALING, SUBSEQUENT ENCOUNTER: ICD-10-CM

## 2022-09-21 DIAGNOSIS — D69.2 OTHER NONTHROMBOCYTOPENIC PURPURA: ICD-10-CM

## 2022-09-21 DIAGNOSIS — E53.8 VITAMIN B12 DEFICIENCY: ICD-10-CM

## 2022-09-21 DIAGNOSIS — T84.9XXA: ICD-10-CM

## 2022-09-21 DIAGNOSIS — I10 ESSENTIAL HYPERTENSION: Chronic | ICD-10-CM

## 2022-09-21 DIAGNOSIS — I10 HYPERTENSION, BENIGN: ICD-10-CM

## 2022-09-21 DIAGNOSIS — E22.2 SYNDROME OF INAPPROPRIATE SECRETION OF ANTIDIURETIC HORMONE: ICD-10-CM

## 2022-09-21 DIAGNOSIS — G89.4 CHRONIC PAIN SYNDROME: ICD-10-CM

## 2022-09-21 DIAGNOSIS — M79.671 RIGHT FOOT PAIN: ICD-10-CM

## 2022-09-21 DIAGNOSIS — N18.30 STAGE 3 CHRONIC KIDNEY DISEASE, UNSPECIFIED WHETHER STAGE 3A OR 3B CKD: ICD-10-CM

## 2022-09-21 DIAGNOSIS — E66.01 SEVERE OBESITY (BMI 35.0-39.9) WITH COMORBIDITY: ICD-10-CM

## 2022-09-21 DIAGNOSIS — I50.42 CHRONIC COMBINED SYSTOLIC AND DIASTOLIC CHF (CONGESTIVE HEART FAILURE): Chronic | ICD-10-CM

## 2022-09-21 PROCEDURE — 99499 UNLISTED E&M SERVICE: CPT | Mod: S$GLB,,, | Performed by: INTERNAL MEDICINE

## 2022-09-21 PROCEDURE — 1111F PR DISCHARGE MEDS RECONCILED W/ CURRENT OUTPATIENT MED LIST: ICD-10-PCS | Mod: CPTII,S$GLB,, | Performed by: INTERNAL MEDICINE

## 2022-09-21 PROCEDURE — 77067 SCR MAMMO BI INCL CAD: CPT | Mod: TC

## 2022-09-21 PROCEDURE — 73630 X-RAY EXAM OF FOOT: CPT | Mod: 26,RT,, | Performed by: RADIOLOGY

## 2022-09-21 PROCEDURE — 96365 THER/PROPH/DIAG IV INF INIT: CPT

## 2022-09-21 PROCEDURE — 99215 OFFICE O/P EST HI 40 MIN: CPT | Mod: S$GLB,,, | Performed by: INTERNAL MEDICINE

## 2022-09-21 PROCEDURE — 1159F MED LIST DOCD IN RCRD: CPT | Mod: CPTII,S$GLB,, | Performed by: INTERNAL MEDICINE

## 2022-09-21 PROCEDURE — 1101F PT FALLS ASSESS-DOCD LE1/YR: CPT | Mod: CPTII,S$GLB,, | Performed by: INTERNAL MEDICINE

## 2022-09-21 PROCEDURE — 1160F RVW MEDS BY RX/DR IN RCRD: CPT | Mod: CPTII,S$GLB,, | Performed by: INTERNAL MEDICINE

## 2022-09-21 PROCEDURE — 4010F ACE/ARB THERAPY RXD/TAKEN: CPT | Mod: CPTII,S$GLB,, | Performed by: INTERNAL MEDICINE

## 2022-09-21 PROCEDURE — 25000003 PHARM REV CODE 250: Performed by: STUDENT IN AN ORGANIZED HEALTH CARE EDUCATION/TRAINING PROGRAM

## 2022-09-21 PROCEDURE — 3288F FALL RISK ASSESSMENT DOCD: CPT | Mod: CPTII,S$GLB,, | Performed by: INTERNAL MEDICINE

## 2022-09-21 PROCEDURE — 99999 PR PBB SHADOW E&M-EST. PATIENT-LVL V: CPT | Mod: PBBFAC,,, | Performed by: INTERNAL MEDICINE

## 2022-09-21 PROCEDURE — 73630 XR FOOT COMPLETE 3 VIEW RIGHT: ICD-10-PCS | Mod: 26,RT,, | Performed by: RADIOLOGY

## 2022-09-21 PROCEDURE — 1159F PR MEDICATION LIST DOCUMENTED IN MEDICAL RECORD: ICD-10-PCS | Mod: CPTII,S$GLB,, | Performed by: INTERNAL MEDICINE

## 2022-09-21 PROCEDURE — 99499 RISK ADDL DX/OHS AUDIT: ICD-10-PCS | Mod: S$GLB,,, | Performed by: INTERNAL MEDICINE

## 2022-09-21 PROCEDURE — 1160F PR REVIEW ALL MEDS BY PRESCRIBER/CLIN PHARMACIST DOCUMENTED: ICD-10-PCS | Mod: CPTII,S$GLB,, | Performed by: INTERNAL MEDICINE

## 2022-09-21 PROCEDURE — 1111F DSCHRG MED/CURRENT MED MERGE: CPT | Mod: CPTII,S$GLB,, | Performed by: INTERNAL MEDICINE

## 2022-09-21 PROCEDURE — 77067 SCR MAMMO BI INCL CAD: CPT | Mod: 26,,, | Performed by: RADIOLOGY

## 2022-09-21 PROCEDURE — 3288F PR FALLS RISK ASSESSMENT DOCUMENTED: ICD-10-PCS | Mod: CPTII,S$GLB,, | Performed by: INTERNAL MEDICINE

## 2022-09-21 PROCEDURE — 73630 X-RAY EXAM OF FOOT: CPT | Mod: TC,FY,RT

## 2022-09-21 PROCEDURE — 4010F PR ACE/ARB THEARPY RXD/TAKEN: ICD-10-PCS | Mod: CPTII,S$GLB,, | Performed by: INTERNAL MEDICINE

## 2022-09-21 PROCEDURE — 3044F HG A1C LEVEL LT 7.0%: CPT | Mod: CPTII,S$GLB,, | Performed by: INTERNAL MEDICINE

## 2022-09-21 PROCEDURE — 3044F PR MOST RECENT HEMOGLOBIN A1C LEVEL <7.0%: ICD-10-PCS | Mod: CPTII,S$GLB,, | Performed by: INTERNAL MEDICINE

## 2022-09-21 PROCEDURE — 63600175 PHARM REV CODE 636 W HCPCS: Performed by: STUDENT IN AN ORGANIZED HEALTH CARE EDUCATION/TRAINING PROGRAM

## 2022-09-21 PROCEDURE — 99999 PR PBB SHADOW E&M-EST. PATIENT-LVL V: ICD-10-PCS | Mod: PBBFAC,,, | Performed by: INTERNAL MEDICINE

## 2022-09-21 PROCEDURE — 1101F PR PT FALLS ASSESS DOC 0-1 FALLS W/OUT INJ PAST YR: ICD-10-PCS | Mod: CPTII,S$GLB,, | Performed by: INTERNAL MEDICINE

## 2022-09-21 PROCEDURE — 77067 MAMMO DIGITAL SCREENING BILAT: ICD-10-PCS | Mod: 26,,, | Performed by: RADIOLOGY

## 2022-09-21 PROCEDURE — 99215 PR OFFICE/OUTPT VISIT, EST, LEVL V, 40-54 MIN: ICD-10-PCS | Mod: S$GLB,,, | Performed by: INTERNAL MEDICINE

## 2022-09-21 RX ORDER — LOSARTAN POTASSIUM 100 MG/1
100 TABLET ORAL DAILY
Qty: 90 TABLET | Refills: 3 | Status: ON HOLD | OUTPATIENT
Start: 2022-09-21 | End: 2023-01-24 | Stop reason: SDUPTHER

## 2022-09-21 RX ORDER — HEPARIN 100 UNIT/ML
500 SYRINGE INTRAVENOUS
OUTPATIENT
Start: 2022-09-28

## 2022-09-21 RX ORDER — SODIUM CHLORIDE 0.9 % (FLUSH) 0.9 %
10 SYRINGE (ML) INJECTION
OUTPATIENT
Start: 2022-09-28

## 2022-09-21 RX ORDER — SODIUM CHLORIDE 0.9 % (FLUSH) 0.9 %
10 SYRINGE (ML) INJECTION
Status: DISCONTINUED | OUTPATIENT
Start: 2022-09-21 | End: 2022-09-21 | Stop reason: HOSPADM

## 2022-09-21 RX ORDER — HEPARIN 100 UNIT/ML
500 SYRINGE INTRAVENOUS
Status: DISCONTINUED | OUTPATIENT
Start: 2022-09-21 | End: 2022-09-21 | Stop reason: HOSPADM

## 2022-09-21 RX ADMIN — IRON SUCROSE 200 MG: 20 INJECTION, SOLUTION INTRAVENOUS at 02:09

## 2022-09-21 RX ADMIN — SODIUM CHLORIDE: 0.9 INJECTION, SOLUTION INTRAVENOUS at 02:09
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
## 2022-09-21 NOTE — PROGRESS NOTES
Patient was given information for the Prolia injection. Prior to administration, the patient's allergies were reviewed. The area of injection was identified in the abdomen. This area was cleaned with alcohol. 60mg of Prolia was placed into the subcutaneous tissue. The injection site was dressed with a bandage. Patient experienced no complications and was discharged in stable condition. Patient was given aftercare instructions. Prolia Lot: [id number] Exp: 10/24

## 2022-09-29 ENCOUNTER — OFFICE VISIT (OUTPATIENT)
Dept: HEMATOLOGY/ONCOLOGY | Facility: CLINIC | Age: 72
End: 2022-09-29
Payer: MEDICARE

## 2022-09-29 DIAGNOSIS — D50.0 IRON DEFICIENCY ANEMIA DUE TO CHRONIC BLOOD LOSS: ICD-10-CM

## 2022-09-29 DIAGNOSIS — J96.11 CHRONIC RESPIRATORY FAILURE WITH HYPOXIA: ICD-10-CM

## 2022-09-29 DIAGNOSIS — C34.92 RECURRENT ADENOCARCINOMA OF LEFT LUNG: Primary | ICD-10-CM

## 2022-09-29 PROCEDURE — 99213 PR OFFICE/OUTPT VISIT, EST, LEVL III, 20-29 MIN: ICD-10-PCS | Mod: 95,,, | Performed by: STUDENT IN AN ORGANIZED HEALTH CARE EDUCATION/TRAINING PROGRAM

## 2022-09-29 PROCEDURE — 4010F PR ACE/ARB THEARPY RXD/TAKEN: ICD-10-PCS | Mod: CPTII,95,, | Performed by: STUDENT IN AN ORGANIZED HEALTH CARE EDUCATION/TRAINING PROGRAM

## 2022-09-29 PROCEDURE — 99213 OFFICE O/P EST LOW 20 MIN: CPT | Mod: 95,,, | Performed by: STUDENT IN AN ORGANIZED HEALTH CARE EDUCATION/TRAINING PROGRAM

## 2022-09-29 PROCEDURE — 1160F PR REVIEW ALL MEDS BY PRESCRIBER/CLIN PHARMACIST DOCUMENTED: ICD-10-PCS | Mod: CPTII,95,, | Performed by: STUDENT IN AN ORGANIZED HEALTH CARE EDUCATION/TRAINING PROGRAM

## 2022-09-29 PROCEDURE — 3044F PR MOST RECENT HEMOGLOBIN A1C LEVEL <7.0%: ICD-10-PCS | Mod: CPTII,95,, | Performed by: STUDENT IN AN ORGANIZED HEALTH CARE EDUCATION/TRAINING PROGRAM

## 2022-09-29 PROCEDURE — 1159F MED LIST DOCD IN RCRD: CPT | Mod: CPTII,95,, | Performed by: STUDENT IN AN ORGANIZED HEALTH CARE EDUCATION/TRAINING PROGRAM

## 2022-09-29 PROCEDURE — 1160F RVW MEDS BY RX/DR IN RCRD: CPT | Mod: CPTII,95,, | Performed by: STUDENT IN AN ORGANIZED HEALTH CARE EDUCATION/TRAINING PROGRAM

## 2022-09-29 PROCEDURE — 1111F DSCHRG MED/CURRENT MED MERGE: CPT | Mod: CPTII,95,, | Performed by: STUDENT IN AN ORGANIZED HEALTH CARE EDUCATION/TRAINING PROGRAM

## 2022-09-29 PROCEDURE — 1111F PR DISCHARGE MEDS RECONCILED W/ CURRENT OUTPATIENT MED LIST: ICD-10-PCS | Mod: CPTII,95,, | Performed by: STUDENT IN AN ORGANIZED HEALTH CARE EDUCATION/TRAINING PROGRAM

## 2022-09-29 PROCEDURE — 3044F HG A1C LEVEL LT 7.0%: CPT | Mod: CPTII,95,, | Performed by: STUDENT IN AN ORGANIZED HEALTH CARE EDUCATION/TRAINING PROGRAM

## 2022-09-29 PROCEDURE — 1159F PR MEDICATION LIST DOCUMENTED IN MEDICAL RECORD: ICD-10-PCS | Mod: CPTII,95,, | Performed by: STUDENT IN AN ORGANIZED HEALTH CARE EDUCATION/TRAINING PROGRAM

## 2022-09-29 PROCEDURE — 4010F ACE/ARB THERAPY RXD/TAKEN: CPT | Mod: CPTII,95,, | Performed by: STUDENT IN AN ORGANIZED HEALTH CARE EDUCATION/TRAINING PROGRAM

## 2022-09-29 NOTE — PROGRESS NOTES
Hematology - oncology PROGRESS NOTE     Subjective:       Patient ID: Nalini Varma is a 71 y.o. female.     Chief Complaint: follow up for chronic anemia     Diagnosis:  1. Anemia of chronic disease  2. Stage I Lung adenocarcinoma of right upper lobe, lepidic pattern, s/p SBRT in Oct/Nov 2021     Oncologic History:  1. Ms Varma is a 70 yo woman with chronic pain, anxiety, COPD on 2 liters of home oxygen, CHF with preserved ejection fraction, HTN, CKD stage 3 (creatinine 1.6), who presents for further evaluation of newly diagnosed lung adenocarcinoma. She underwent a screening CT chest on 7/23/20, which showed a 0.6 cm right upper lobe noncalcified pulmonary nodule which measured 0.4 cm on previous CT dated 03/20/2019.  There is a pleural based 1.2 cm pulmonary nodule within the right upper lobe which measured 0.9 cm on previous CT.  There is a 1.2 cm partially spiculated right upper lobe noncalcified pulmonary nodule which measured 0.6 cm on previous CT. She underwent a right lung biopsy on 9/4/2020. Pathology showed adenocarcinoma, well differentiated, lepidic pattern, cannot rule out an invasive component. She presents today for further evaluation. Has been smoking 2PPD for 50 years. Enrolled in smoking cessation program. Now down to 1PPD. Has been on 2L O2 for many years. Needs to use 3 liters when she is wearing a mask. Has CHF. On torsemide. Uses two pillows at night. Unable to walk for a block due to dyspnea. No weight loss. Lives by herself  2. CT head negative for metastases. PET scan 10/7/20: Two right upper lobe nodule with no appreciable activity.  Please note that FDG PET has poor sensitivity for small, well differentiated, and/or indolent malignancies.  An additional subcentimeter node in the apex is too small to characterize by PET for which attention on follow-up is recommended. Mildly enlarged mediastinal lymph nodes, as above, with no hypermetabolic activity.  Metastatic  involvement is not excluded.  3. Seen by Dr Mead. Underwent SBRT 10/29/21-21       Interval History:     PET scan shows concern for possible progression. She denied any chest pain or worsening sob. She uses 2-3 L oxygen at home. She denied any hemoptysis.  Underwent biopsy , couldn't get tissues. Had pneumothorax. Req chest tube . Was in hospital   Started on iron infusions, tolerated well    stable chronic dyspnea with exertion. Wearing oxygen.     ECO     ROS:   A ten-point system review is obtained and negative except for what was stated in the Interval History.      Physical Examination:   General: well hydrated, well developed, in no acute distress, wearing oxygen  HEENT: normocephalic, PERRLA, EOMI, anicteric sclerae  Psych: pleasant and appropriate mood and affect     Objective:      Laboratory Data:  Labs reviewed.      Imaging Data:  PET scan 10/7/2020:  Impression:     1. Two right upper lobe nodule with no appreciable activity.  Please note that FDG PET has poor sensitivity for small, well differentiated, and/or indolent malignancies.  An additional subcentimeter node in the apex is too small to characterize by PET for which attention on follow-up is recommended.  2. Mildly enlarged mediastinal lymph nodes, as above, with no hypermetabolic activity.  Metastatic involvement is not excluded.  3. Additional CT findings, as above.     CT head 20:  Impression:     No evidence of acute intracranial pathology.     Moderate patchy mucoperiosteal thickening in the paranasal sinuses.    CT chest 2021:  Development of central cavitation in previously described 12 mm right upper lobe pulmonary nodule which may be inflammatory in nature including tuberculous and non tuberculous mycobacterial infection, fungal infection etc.  Neoplasm is also included in the differential.     Improved subpleural right upper lobe nodular opacity and stable ground-glass nodule right apex.     Stable calcified  granuloma and postinflammatory changes in the lingula probably related to previous non tuberculous mycobacterial infection.     Severe atherosclerosis and severe hepatic steatosis            Assessment and Plan:      1. Recurrent adenocarcinoma of left lung    2. Iron deficiency anemia due to chronic blood loss    3. Chronic respiratory failure with hypoxia        Recurrent lung cancer   Pt had adenocarcinoma which was treated with SBRT in Oct/Nov 2020 with Dr Mead. Recent imaging with CT and PET concerning for recurrence and jamal involvement. She is poor surgical candidate. We discussed weekly chemo along with radiation. If pt is not able to tolerate the chemo, will dose reduce or discontinue chemo. Pt understands the risks and benefits and prefers to try chemo with radiation with the understanding that if treatment becomes too uncomfortable we will hold chemo and pursue with radiation alone.   Had repeat biopsy procedure, couldn't get tissue, had PTX, req chest tube  Pt discussed in tumor board, plan to repeat scan in 6 weeks already scheduled end of Oct.     LESLY- on venofer, monitor    Pancreatic lesion- being followed by Dr Cheema.              Future Appointments   Date Time Provider Department Center   10/6/2022 12:00 PM LAB, [facility] LABDRAW [facility]   10/6/2022 12:30 PM MODERNA VACCINE, [facility] RETAIL PHARMACY [facility] RE PHA [facility]   10/12/2022 11:00 AM Kary Torres NP [facility] UROGYN [facility]   10/14/2022 12:00 PM JESE [facility] PSYCH [address]   10/28/2022 12:00 PM [facility] BCC CT1 [facility] CT RAFLA [facility]   10/28/2022  1:30 PM Casey Mead MD [facility] RADONC[address]   1/4/2023  1:00 PM Yane Shani MD [facility] IM [facility]     The patient location is: [place]  The chief complaint leading to consultation is: see chief complaint below    Visit type: audiovisual    Face to Face time with patient: 10  20  minutes of total time spent on the encounter, which includes face to face  time and non-face to face time preparing to see the patient (eg, review of tests), Obtaining and/or reviewing separately obtained history, Documenting clinical information in the electronic or other health record, Independently interpreting results (not separately reported) and communicating results to the patient/family/caregiver, or Care coordination (not separately reported).         Each patient to whom he or she provides medical services by telemedicine is:  (1) informed of the relationship between the physician and patient and the respective role of any other health care provider with respect to management of the patient; and (2) notified that he or she may decline to receive medical services by telemedicine and may withdraw from such care at any time.

## 2022-09-30 ENCOUNTER — PATIENT MESSAGE (OUTPATIENT)
Dept: INTERNAL MEDICINE | Facility: CLINIC | Age: 72
End: 2022-09-30
Payer: MEDICARE

## 2022-10-02 PROBLEM — E22.2 SYNDROME OF INAPPROPRIATE SECRETION OF ANTIDIURETIC HORMONE: Status: ACTIVE | Noted: 2022-10-02

## 2022-10-02 PROBLEM — D69.2 OTHER NONTHROMBOCYTOPENIC PURPURA: Status: ACTIVE | Noted: 2022-10-02

## 2022-10-02 PROBLEM — E66.01 SEVERE OBESITY (BMI 35.0-39.9) WITH COMORBIDITY: Status: ACTIVE | Noted: 2022-04-29

## 2022-10-02 PROBLEM — E66.9 CLASS 1 OBESITY WITH SERIOUS COMORBIDITY AND BODY MASS INDEX (BMI) OF 34.0 TO 34.9 IN ADULT: Status: RESOLVED | Noted: 2022-04-29 | Resolved: 2022-10-02

## 2022-10-02 PROBLEM — J95.811 PNEUMOTHORAX AFTER BIOPSY: Status: RESOLVED | Noted: 2022-09-12 | Resolved: 2022-10-02

## 2022-10-02 PROBLEM — T84.9XXA: Status: ACTIVE | Noted: 2022-10-02

## 2022-10-04 ENCOUNTER — PATIENT MESSAGE (OUTPATIENT)
Dept: PSYCHIATRY | Facility: CLINIC | Age: 72
End: 2022-10-04
Payer: MEDICARE

## 2022-10-09 ENCOUNTER — PATIENT MESSAGE (OUTPATIENT)
Dept: INTERNAL MEDICINE | Facility: CLINIC | Age: 72
End: 2022-10-09
Payer: MEDICARE

## 2022-10-09 DIAGNOSIS — K57.90 DIVERTICULOSIS: ICD-10-CM

## 2022-10-09 RX ORDER — GABAPENTIN 300 MG/1
300 CAPSULE ORAL 3 TIMES DAILY
Qty: 90 CAPSULE | Refills: 11 | Status: CANCELLED | OUTPATIENT
Start: 2022-10-09 | End: 2023-10-09

## 2022-10-10 RX ORDER — GABAPENTIN 300 MG/1
300 CAPSULE ORAL 3 TIMES DAILY
Qty: 90 CAPSULE | Refills: 11 | Status: ON HOLD | OUTPATIENT
Start: 2022-10-10 | End: 2023-06-15

## 2022-10-10 RX ORDER — PANTOPRAZOLE SODIUM 40 MG/1
40 TABLET, DELAYED RELEASE ORAL DAILY
Qty: 90 TABLET | Refills: 3 | Status: ON HOLD | OUTPATIENT
Start: 2022-10-10 | End: 2023-06-15

## 2022-10-10 NOTE — TELEPHONE ENCOUNTER
Refill Decision Note   Nalini Varma  is requesting a refill authorization.  Brief Assessment and Rationale for Refill:  Approve     Medication Therapy Plan:       Medication Reconciliation Completed: No   Comments:     No Care Gaps recommended.     Note composed:7:30 AM 10/10/2022

## 2022-10-10 NOTE — TELEPHONE ENCOUNTER
No new care gaps identified.  [facility] Embedded Care Gaps. Reference number: [id number]. 10/10/2022   7:34:27 AM JERRODT

## 2022-10-12 ENCOUNTER — TELEPHONE (OUTPATIENT)
Dept: PSYCHIATRY | Facility: CLINIC | Age: 72
End: 2022-10-12
Payer: MEDICARE

## 2022-10-12 NOTE — TELEPHONE ENCOUNTER
----- Message from Mariana Burns sent at 10/12/2022 11:37 AM CDT -----  Regarding: Appt  Pt called to reschedule an appt from the recent book out, however, she is concerned that the next available opening is not until 10/31/22.  She is requesting a returned call to get her in sooner.    She can be reached at [phone].    Thank you.

## 2022-10-13 ENCOUNTER — OFFICE VISIT (OUTPATIENT)
Dept: PSYCHIATRY | Facility: CLINIC | Age: 72
End: 2022-10-13
Payer: MEDICARE

## 2022-10-13 DIAGNOSIS — F43.10 PTSD (POST-TRAUMATIC STRESS DISORDER): ICD-10-CM

## 2022-10-13 DIAGNOSIS — E55.9 VITAMIN D DEFICIENCY: ICD-10-CM

## 2022-10-13 DIAGNOSIS — F41.1 GENERALIZED ANXIETY DISORDER: ICD-10-CM

## 2022-10-13 DIAGNOSIS — F33.2 MDD (MAJOR DEPRESSIVE DISORDER), RECURRENT SEVERE, WITHOUT PSYCHOSIS: Primary | ICD-10-CM

## 2022-10-13 PROCEDURE — 4010F PR ACE/ARB THEARPY RXD/TAKEN: ICD-10-PCS | Mod: CPTII,95,S$GLB, | Performed by: PHYSICIAN ASSISTANT

## 2022-10-13 PROCEDURE — 1159F MED LIST DOCD IN RCRD: CPT | Mod: CPTII,95,S$GLB, | Performed by: PHYSICIAN ASSISTANT

## 2022-10-13 PROCEDURE — 1111F PR DISCHARGE MEDS RECONCILED W/ CURRENT OUTPATIENT MED LIST: ICD-10-PCS | Mod: CPTII,95,S$GLB, | Performed by: PHYSICIAN ASSISTANT

## 2022-10-13 PROCEDURE — 99999 PR PBB SHADOW E&M-EST. PATIENT-LVL III: CPT | Mod: PBBFAC,,, | Performed by: PHYSICIAN ASSISTANT

## 2022-10-13 PROCEDURE — 3044F HG A1C LEVEL LT 7.0%: CPT | Mod: CPTII,95,S$GLB, | Performed by: PHYSICIAN ASSISTANT

## 2022-10-13 PROCEDURE — 99999 PR PBB SHADOW E&M-EST. PATIENT-LVL III: ICD-10-PCS | Mod: PBBFAC,,, | Performed by: PHYSICIAN ASSISTANT

## 2022-10-13 PROCEDURE — 3044F PR MOST RECENT HEMOGLOBIN A1C LEVEL <7.0%: ICD-10-PCS | Mod: CPTII,95,S$GLB, | Performed by: PHYSICIAN ASSISTANT

## 2022-10-13 PROCEDURE — 4010F ACE/ARB THERAPY RXD/TAKEN: CPT | Mod: CPTII,95,S$GLB, | Performed by: PHYSICIAN ASSISTANT

## 2022-10-13 PROCEDURE — 1111F DSCHRG MED/CURRENT MED MERGE: CPT | Mod: CPTII,95,S$GLB, | Performed by: PHYSICIAN ASSISTANT

## 2022-10-13 PROCEDURE — 99214 OFFICE O/P EST MOD 30 MIN: CPT | Mod: 95,S$GLB,, | Performed by: PHYSICIAN ASSISTANT

## 2022-10-13 PROCEDURE — 1159F PR MEDICATION LIST DOCUMENTED IN MEDICAL RECORD: ICD-10-PCS | Mod: CPTII,95,S$GLB, | Performed by: PHYSICIAN ASSISTANT

## 2022-10-13 PROCEDURE — 1160F PR REVIEW ALL MEDS BY PRESCRIBER/CLIN PHARMACIST DOCUMENTED: ICD-10-PCS | Mod: CPTII,95,S$GLB, | Performed by: PHYSICIAN ASSISTANT

## 2022-10-13 PROCEDURE — 99214 PR OFFICE/OUTPT VISIT, EST, LEVL IV, 30-39 MIN: ICD-10-PCS | Mod: 95,S$GLB,, | Performed by: PHYSICIAN ASSISTANT

## 2022-10-13 PROCEDURE — 1160F RVW MEDS BY RX/DR IN RCRD: CPT | Mod: CPTII,95,S$GLB, | Performed by: PHYSICIAN ASSISTANT

## 2022-10-13 RX ORDER — ALPRAZOLAM 0.25 MG/1
0.25 TABLET ORAL DAILY PRN
Qty: 30 TABLET | Refills: 1 | Status: SHIPPED | OUTPATIENT
Start: 2022-10-13 | End: 2023-01-04 | Stop reason: SDUPTHER

## 2022-10-13 NOTE — PROGRESS NOTES
"The patient location is: [place], [place]  The chief complaint leading to consultation is: depression f/u    Visit type: audiovisual    time with patient: 28  36 minutes of total time spent on the encounter, which includes face to face time and non-face to face time preparing to see the patient (eg, review of tests), Obtaining and/or reviewing separately obtained history, Documenting clinical information in the electronic or other health record, Independently interpreting results (not separately reported) and communicating results to the patient/family/caregiver, or Care coordination (not separately reported).         Each patient to whom he or she provides medical services by telemedicine is:  (1) informed of the relationship between the physician and patient and the respective role of any other health care provider with respect to management of the patient; and (2) notified that he or she may decline to receive medical services by telemedicine and may withdraw from such care at any time.    Notes:       Outpatient Psychiatry Follow-Up Visit (MD/SANDRA)    10/13/2022    Clinical Status of Patient:  Outpatient (Ambulatory)    Chief Complaint:  Nalini Varma is a 71 y.o. female who presents today for follow-up of depression, anxiety and adjustment problems.  Met with patient.      Interval History and Content of Current Session:  Interim Events/Subjective Report/Content of Current Session:    Pt reports today: "having a rough time. I spent most of my money for this month for everything I needed at [facility] and it didn't get delivered and id dint get my money back". Pt facing financial stress due to pay for groceries for month at [facility] and was not delivered, spent yesterday on phone with [facility] billing and having difficulty getting her funds back.    Pt very stressed and anxious regarding this. Discussed having a low dose xanax 0.25mg prn anxiety as pt dealing with significant health issues and other daily life " "issues. Pt agreeable to plan    Wrote emotional support animal letter and will mail to patient so her landlord will allow her to have a emotional support animal in her apartment. Letter will be mailed to patient.    Pt mood notable improved when told letter was written so she could get a cat as a emotional support animal.    States depression improving since last visit after switching back to trazodone from remeron which improved her sleep "yes overall my depression has been better"    Patients mood is anxious and upset, affect appears mood congruent. Linear and logical, friendly and cooperative, good eye contact.    Denies SI/HI/AVH. Pt reports sleeping well and normal appetite. Denies side effects of medications.    Pt reports taking medications as prescribed and behaving appropriately during interview today.        Prior visit: 9/6/22:  Pt reports today: "have biopsy next week for spot in my lungs. Just ready to know and im anxious about it"    Patients mood is depressive and anxious, affect appears mood congruent. Linear and logical, friendly and cooperative, good eye contact.    Denies SI/HI/AVH. Pt reports sleeping poorly on remeron-states trazodone was more effective and normal appetite. Denies side effects of medications.    Pt reports taking medications as prescribed and behaving appropriately during interview today.    Pt discussed wanting letter for emotional support animal so that she have one as she is only allowed to have one at facility she lives at if there is a letter from medical provider      Review of Systems       Psychiatric Review Of Systems - Is patient experiencing or having changes in:  sleep: yes  appetite: no  weight: no  energy/anergy: yes  interest/pleasure/anhedonia: yes  somatic symptoms: no  libido: no  anxiety/panic: yes  guilty/hopelessness: yes  concentration: no  S.I.B.s/risky behavior: no  Irritability: yes  Racing thoughts: yes  Impulsive behaviors: no  Paranoia: no  AVH: " no      Past Medical, Family and Social History: The patient's past medical, family and social history have been reviewed and updated as appropriate within the electronic medical record - see encounter notes.      Current Medications:   Medication List with Changes/Refills   New Medications    ALPRAZOLAM (XANAX) 0.25 MG TABLET    Take 1 tablet (0.25 mg total) by mouth daily as needed for Anxiety.   Current Medications    ALBUTEROL (VENTOLIN HFA) 90 MCG/ACTUATION INHALER    inhale 1-2 puffs as needed every 6 hours for wheezing and shortness of breath    ATORVASTATIN (LIPITOR) 40 MG TABLET    TAKE 1 TABLET BY MOUTH EVERY DAILY    AZELASTINE (ASTELIN) 137 MCG (0.1 %) NASAL SPRAY    1 spray (137 mcg total) by Nasal route 2 (two) times daily.    B COMPLEX VITAMINS CAPSULE    Take 1 capsule by mouth once daily.    CALCIUM CARBONATE (OS-SANDRINE) 500 MG CALCIUM (1,250 MG) TABLET    Take 2 tablets (1,000 mg total) by mouth 2 (two) times daily.    CYANOCOBALAMIN, VITAMIN B-12, 1,000 MCG TBSR    Take 1,000 mcg by mouth once daily.    DENOSUMAB (PROLIA) 60 MG/ML SYRG    Inject 1 mL (60 mg total) into the skin every 6 (six) months.    DULOXETINE (CYMBALTA) 60 MG CAPSULE    Take 1 capsule (60 mg total) by mouth once daily.    FLUTICASONE (FLONASE) 50 MCG/ACTUATION NASAL SPRAY    1 spray by Each Nare route 2 (two) times daily as needed for Rhinitis.    FLUTICASONE PROPION-SALMETEROL 115-21 MCG/DOSE (ADVAIR HFA) 115-21 MCG/ACTUATION HFAA INHALER    Inhale 2 puffs into the lungs every 12 (twelve) hours.    GABAPENTIN (NEURONTIN) 300 MG CAPSULE    Take 1 capsule (300 mg total) by mouth 3 (three) times daily.    GUAIFENESIN (MUCINEX) 600 MG 12 HR TABLET    Take 1,200 mg by mouth 2 (two) times daily as needed for Congestion.    HYDROCODONE-ACETAMINOPHEN (NORCO)  MG PER TABLET    Take 1 tablet by mouth every 12 (twelve) hours as needed for Pain.    LOSARTAN (COZAAR) 100 MG TABLET    Take 1 tablet (100 mg total) by mouth once daily.     MAGNESIUM OXIDE (MAG-OX) 400 MG (241.3 MG MAGNESIUM) TABLET    Take 1 tablet by mouth every 12 (twelve) hours.    METOPROLOL SUCCINATE (TOPROL-XL) 25 MG 24 HR TABLET    Take 2 tablets (50 mg total) by mouth once daily.    MULTIVIT-MIN/IRON/FOLIC/LUTEIN (CENTRUM SILVER WOMEN ORAL)    Take 1 tablet by mouth once daily.    PANTOPRAZOLE (PROTONIX) 40 MG TABLET    Take 1 tablet (40 mg total) by mouth once daily.    POTASSIUM CHLORIDE (MICRO-K) 10 MEQ CPSR    Take 10 mEq by mouth.    TORSEMIDE (DEMADEX) 20 MG TAB    Take 1 tablet (20 mg total) by mouth once daily.    TRAZODONE (DESYREL) 100 MG TABLET    Take 1 tablet (100 mg total) by mouth every evening.    UMECLIDINIUM (INCRUSE ELLIPTA) 62.5 MCG/ACTUATION INHALATION CAPSULE    Inhale 1 puff into the lungs once daily. Controller    VITAMIN D (VITAMIN D3) 1000 UNITS TAB    Take 1 tablet (1,000 Units total) by mouth once daily.    ZINC ORAL    Take by mouth.         Allergies:   Review of patient's allergies indicates:   Allergen Reactions    Wellbutrin [bupropion hcl] Other (See Comments)     Hyponatremia and hypomagnesia     Zoloft [sertraline] Other (See Comments)     Hyponatremia and hypomagnesia     Bananas [banana]      Emesis, and stomach cramps         Vitals   There were no vitals filed for this visit.       Labs/Imaging/Studies:   No results found for this or any previous visit (from the past 48 hour(s)).   No results found for: PHENYTOIN, PHENOBARB, VALPROATE, CBMZ    Compliance: yes    Side effects: None    Risk Parameters:  Patient reports no suicidal ideation  Patient reports no homicidal ideation  Patient reports no self-injurious behavior  Patient reports no violent behavior    Exam (detailed: at least 9 elements; comprehensive: all 15 elements)   Constitutional  Vitals:  Most recent vital signs, dated less than 90 days prior to this appointment, were reviewed.   There were no vitals filed for this visit.     General:  unremarkable, age appropriate      Musculoskeletal  Muscle Strength/Tone:  not examined   Gait & Station:  Not examined     Psychiatric  Speech:  no latency; no press   Mood & Affect:  anxious, depressed  Mood congruent   Thought Process:  normal and logical   Associations:  intact   Thought Content:  normal, no suicidality, no homicidality, delusions, or paranoia   Insight:  intact, has awareness of illness   Judgement: behavior is adequate to circumstances   Orientation:  grossly intact   Memory: intact for content of interview   Language: grossly intact   Attention Span & Concentration:  able to focus   Fund of Knowledge:  intact and appropriate to age and level of education     Assessment and Diagnosis   Status/Progress: Based on the examination today, the patient's problem(s) is/are inadequately controlled.  New problems have been presented today.   Co-morbidities are complicating management of the primary condition.  There are no active rule-out diagnoses for this patient at this time.     General Impression:      ICD-10-CM ICD-9-CM   1. MDD (major depressive disorder), recurrent severe, without psychosis  F33.2 296.33   2. PTSD (post-traumatic stress disorder)  F43.10 309.81   3. Generalized anxiety disorder  F41.1 300.02   4. Vitamin D deficiency  E55.9 268.9           Intervention/Counseling/Treatment Plan   Medication Management: Continue current medications. The risks and benefits of medication were discussed with the patient.    -cymbalta to 60mg daily  -trazodone at 100mg qhs    -start xanax 0.25mg daily prn severe anxiety. Instructed pt to use sparingly    -continue current vitamin D supplement    -prescribing emotional support animal. Letter being sent to patients landlord/housing authority.    Return to Clinic: as scheduled        Leonardo Kolb PA-C      Total face to face time: 28 min  Total time (chart review, patient contact, documentation): 35 min      *This note has been prepared using a combination of a dictation device and  typing.  It has been checked for errors but some errors may still exist within the note as a result of speech recognition errors and/or typographical errors.

## 2022-10-14 ENCOUNTER — PATIENT MESSAGE (OUTPATIENT)
Dept: INTERNAL MEDICINE | Facility: CLINIC | Age: 72
End: 2022-10-14
Payer: MEDICARE

## 2022-10-14 DIAGNOSIS — M17.0 PRIMARY OSTEOARTHRITIS OF BOTH KNEES: ICD-10-CM

## 2022-10-14 DIAGNOSIS — G89.4 CHRONIC PAIN SYNDROME: ICD-10-CM

## 2022-10-14 RX ORDER — HYDROCODONE BITARTRATE AND ACETAMINOPHEN 10; 325 MG/1; MG/1
1 TABLET ORAL EVERY 12 HOURS PRN
Qty: 60 TABLET | Refills: 0 | Status: SHIPPED | OUTPATIENT
Start: 2022-10-14 | End: 2022-11-16 | Stop reason: SDUPTHER

## 2022-10-14 NOTE — TELEPHONE ENCOUNTER
No new care gaps identified.  [facility] Embedded Care Gaps. Reference number: [id number]. 10/14/2022   9:44:06 AM JERRODT

## 2022-10-28 ENCOUNTER — OFFICE VISIT (OUTPATIENT)
Dept: RADIATION ONCOLOGY | Facility: CLINIC | Age: 72
End: 2022-10-28
Payer: MEDICARE

## 2022-10-28 ENCOUNTER — OFFICE VISIT (OUTPATIENT)
Dept: HEMATOLOGY/ONCOLOGY | Facility: CLINIC | Age: 72
End: 2022-10-28
Payer: MEDICARE

## 2022-10-28 ENCOUNTER — HOSPITAL ENCOUNTER (OUTPATIENT)
Dept: RADIOLOGY | Facility: HOSPITAL | Age: 72
Discharge: HOME | End: 2022-10-28
Attending: RADIOLOGY
Payer: MEDICARE

## 2022-10-28 VITALS
SYSTOLIC BLOOD PRESSURE: 115 MMHG | OXYGEN SATURATION: 98 % | HEART RATE: 97 BPM | DIASTOLIC BLOOD PRESSURE: 58 MMHG | RESPIRATION RATE: 20 BRPM

## 2022-10-28 DIAGNOSIS — C34.92 RECURRENT ADENOCARCINOMA OF LEFT LUNG: Primary | ICD-10-CM

## 2022-10-28 DIAGNOSIS — Z85.118 HISTORY OF LUNG CANCER: ICD-10-CM

## 2022-10-28 DIAGNOSIS — D50.0 IRON DEFICIENCY ANEMIA DUE TO CHRONIC BLOOD LOSS: ICD-10-CM

## 2022-10-28 DIAGNOSIS — J96.11 CHRONIC RESPIRATORY FAILURE WITH HYPOXIA: ICD-10-CM

## 2022-10-28 DIAGNOSIS — C77.1 SECONDARY MALIGNANT NEOPLASM OF INTRATHORACIC LYMPH NODES: ICD-10-CM

## 2022-10-28 PROCEDURE — 99214 OFFICE O/P EST MOD 30 MIN: CPT | Mod: S$GLB,,, | Performed by: RADIOLOGY

## 2022-10-28 PROCEDURE — 4010F ACE/ARB THERAPY RXD/TAKEN: CPT | Mod: CPTII,95,, | Performed by: STUDENT IN AN ORGANIZED HEALTH CARE EDUCATION/TRAINING PROGRAM

## 2022-10-28 PROCEDURE — 1159F PR MEDICATION LIST DOCUMENTED IN MEDICAL RECORD: ICD-10-PCS | Mod: CPTII,95,, | Performed by: STUDENT IN AN ORGANIZED HEALTH CARE EDUCATION/TRAINING PROGRAM

## 2022-10-28 PROCEDURE — 3074F PR MOST RECENT SYSTOLIC BLOOD PRESSURE < 130 MM HG: ICD-10-PCS | Mod: CPTII,S$GLB,, | Performed by: RADIOLOGY

## 2022-10-28 PROCEDURE — 99999 PR PBB SHADOW E&M-EST. PATIENT-LVL IV: ICD-10-PCS | Mod: PBBFAC,,, | Performed by: RADIOLOGY

## 2022-10-28 PROCEDURE — 71250 CT THORAX DX C-: CPT | Mod: 26,,, | Performed by: RADIOLOGY

## 2022-10-28 PROCEDURE — 3078F PR MOST RECENT DIASTOLIC BLOOD PRESSURE < 80 MM HG: ICD-10-PCS | Mod: CPTII,S$GLB,, | Performed by: RADIOLOGY

## 2022-10-28 PROCEDURE — 71250 CT THORAX DX C-: CPT | Mod: TC

## 2022-10-28 PROCEDURE — 1159F MED LIST DOCD IN RCRD: CPT | Mod: CPTII,95,, | Performed by: STUDENT IN AN ORGANIZED HEALTH CARE EDUCATION/TRAINING PROGRAM

## 2022-10-28 PROCEDURE — 3078F DIAST BP <80 MM HG: CPT | Mod: CPTII,S$GLB,, | Performed by: RADIOLOGY

## 2022-10-28 PROCEDURE — 3074F SYST BP LT 130 MM HG: CPT | Mod: CPTII,S$GLB,, | Performed by: RADIOLOGY

## 2022-10-28 PROCEDURE — 1160F RVW MEDS BY RX/DR IN RCRD: CPT | Mod: CPTII,95,, | Performed by: STUDENT IN AN ORGANIZED HEALTH CARE EDUCATION/TRAINING PROGRAM

## 2022-10-28 PROCEDURE — 3288F FALL RISK ASSESSMENT DOCD: CPT | Mod: CPTII,S$GLB,, | Performed by: RADIOLOGY

## 2022-10-28 PROCEDURE — 3044F HG A1C LEVEL LT 7.0%: CPT | Mod: CPTII,95,, | Performed by: STUDENT IN AN ORGANIZED HEALTH CARE EDUCATION/TRAINING PROGRAM

## 2022-10-28 PROCEDURE — 99499 RISK ADDL DX/OHS AUDIT: ICD-10-PCS | Mod: S$PBB,,, | Performed by: RADIOLOGY

## 2022-10-28 PROCEDURE — 4010F PR ACE/ARB THEARPY RXD/TAKEN: ICD-10-PCS | Mod: CPTII,S$GLB,, | Performed by: RADIOLOGY

## 2022-10-28 PROCEDURE — 99999 PR PBB SHADOW E&M-EST. PATIENT-LVL IV: CPT | Mod: PBBFAC,,, | Performed by: RADIOLOGY

## 2022-10-28 PROCEDURE — 1159F PR MEDICATION LIST DOCUMENTED IN MEDICAL RECORD: ICD-10-PCS | Mod: CPTII,S$GLB,, | Performed by: RADIOLOGY

## 2022-10-28 PROCEDURE — 71250 CT CHEST WITHOUT CONTRAST: ICD-10-PCS | Mod: 26,,, | Performed by: RADIOLOGY

## 2022-10-28 PROCEDURE — 3044F HG A1C LEVEL LT 7.0%: CPT | Mod: CPTII,S$GLB,, | Performed by: RADIOLOGY

## 2022-10-28 PROCEDURE — 3044F PR MOST RECENT HEMOGLOBIN A1C LEVEL <7.0%: ICD-10-PCS | Mod: CPTII,95,, | Performed by: STUDENT IN AN ORGANIZED HEALTH CARE EDUCATION/TRAINING PROGRAM

## 2022-10-28 PROCEDURE — 99212 PR OFFICE/OUTPT VISIT, EST, LEVL II, 10-19 MIN: ICD-10-PCS | Mod: 95,,, | Performed by: STUDENT IN AN ORGANIZED HEALTH CARE EDUCATION/TRAINING PROGRAM

## 2022-10-28 PROCEDURE — 99499 UNLISTED E&M SERVICE: CPT | Mod: S$PBB,,, | Performed by: RADIOLOGY

## 2022-10-28 PROCEDURE — 1101F PR PT FALLS ASSESS DOC 0-1 FALLS W/OUT INJ PAST YR: ICD-10-PCS | Mod: CPTII,S$GLB,, | Performed by: RADIOLOGY

## 2022-10-28 PROCEDURE — 99212 OFFICE O/P EST SF 10 MIN: CPT | Mod: 95,,, | Performed by: STUDENT IN AN ORGANIZED HEALTH CARE EDUCATION/TRAINING PROGRAM

## 2022-10-28 PROCEDURE — 3044F PR MOST RECENT HEMOGLOBIN A1C LEVEL <7.0%: ICD-10-PCS | Mod: CPTII,S$GLB,, | Performed by: RADIOLOGY

## 2022-10-28 PROCEDURE — 4010F ACE/ARB THERAPY RXD/TAKEN: CPT | Mod: CPTII,S$GLB,, | Performed by: RADIOLOGY

## 2022-10-28 PROCEDURE — 1159F MED LIST DOCD IN RCRD: CPT | Mod: CPTII,S$GLB,, | Performed by: RADIOLOGY

## 2022-10-28 PROCEDURE — 1101F PT FALLS ASSESS-DOCD LE1/YR: CPT | Mod: CPTII,S$GLB,, | Performed by: RADIOLOGY

## 2022-10-28 PROCEDURE — 1160F PR REVIEW ALL MEDS BY PRESCRIBER/CLIN PHARMACIST DOCUMENTED: ICD-10-PCS | Mod: CPTII,95,, | Performed by: STUDENT IN AN ORGANIZED HEALTH CARE EDUCATION/TRAINING PROGRAM

## 2022-10-28 PROCEDURE — 3288F PR FALLS RISK ASSESSMENT DOCUMENTED: ICD-10-PCS | Mod: CPTII,S$GLB,, | Performed by: RADIOLOGY

## 2022-10-28 PROCEDURE — 99214 PR OFFICE/OUTPT VISIT, EST, LEVL IV, 30-39 MIN: ICD-10-PCS | Mod: S$GLB,,, | Performed by: RADIOLOGY

## 2022-10-28 PROCEDURE — 4010F PR ACE/ARB THEARPY RXD/TAKEN: ICD-10-PCS | Mod: CPTII,95,, | Performed by: STUDENT IN AN ORGANIZED HEALTH CARE EDUCATION/TRAINING PROGRAM

## 2022-10-28 PROCEDURE — 1126F PR PAIN SEVERITY QUANTIFIED, NO PAIN PRESENT: ICD-10-PCS | Mod: CPTII,S$GLB,, | Performed by: RADIOLOGY

## 2022-10-28 PROCEDURE — 1126F AMNT PAIN NOTED NONE PRSNT: CPT | Mod: CPTII,S$GLB,, | Performed by: RADIOLOGY

## 2022-10-28 NOTE — PROGRESS NOTES
Hematology - oncology PROGRESS NOTE     Subjective:       Patient ID: Nalini Varma is a 71 y.o. female.     Chief Complaint: follow up for chronic anemia     Diagnosis:  1. Anemia of chronic disease  2. Stage I Lung adenocarcinoma of right upper lobe, lepidic pattern, s/p SBRT in Oct/Nov 2021     Oncologic History:  1. Ms Varma is a 68 yo woman with chronic pain, anxiety, COPD on 2 liters of home oxygen, CHF with preserved ejection fraction, HTN, CKD stage 3 (creatinine 1.6), who presents for further evaluation of newly diagnosed lung adenocarcinoma. She underwent a screening CT chest on 7/23/20, which showed a 0.6 cm right upper lobe noncalcified pulmonary nodule which measured 0.4 cm on previous CT dated 03/20/2019.  There is a pleural based 1.2 cm pulmonary nodule within the right upper lobe which measured 0.9 cm on previous CT.  There is a 1.2 cm partially spiculated right upper lobe noncalcified pulmonary nodule which measured 0.6 cm on previous CT. She underwent a right lung biopsy on 9/4/2020. Pathology showed adenocarcinoma, well differentiated, lepidic pattern, cannot rule out an invasive component. She presents today for further evaluation. Has been smoking 2PPD for 50 years. Enrolled in smoking cessation program. Now down to 1PPD. Has been on 2L O2 for many years. Needs to use 3 liters when she is wearing a mask. Has CHF. On torsemide. Uses two pillows at night. Unable to walk for a block due to dyspnea. No weight loss. Lives by herself  2. CT head negative for metastases. PET scan 10/7/20: Two right upper lobe nodule with no appreciable activity.  Please note that FDG PET has poor sensitivity for small, well differentiated, and/or indolent malignancies.  An additional subcentimeter node in the apex is too small to characterize by PET for which attention on follow-up is recommended. Mildly enlarged mediastinal lymph nodes, as above, with no hypermetabolic activity.  Metastatic  involvement is not excluded.  3. Seen by Dr Mead. Underwent SBRT 10/29/21-21       Interval History:   Saw Dr Mead today. No new complains.   PET scan shows concern for possible progression. She denied any chest pain or worsening sob. She uses 2-3 L oxygen at home. She denied any hemoptysis.  Underwent biopsy , couldn't get tissues. Had pneumothorax. Req chest tube . Was in hospital   Started on iron infusions, tolerated well    stable chronic dyspnea with exertion. Wearing oxygen.     ECO     ROS:   A ten-point system review is obtained and negative except for what was stated in the Interval History.      Physical Examination:   General: well hydrated, well developed, in no acute distress, wearing oxygen  HEENT: normocephalic, PERRLA, EOMI, anicteric sclerae  Psych: pleasant and appropriate mood and affect     Objective:      Laboratory Data:  Labs reviewed.      Imaging Data:  PET scan 10/7/2020:  Impression:     1. Two right upper lobe nodule with no appreciable activity.  Please note that FDG PET has poor sensitivity for small, well differentiated, and/or indolent malignancies.  An additional subcentimeter node in the apex is too small to characterize by PET for which attention on follow-up is recommended.  2. Mildly enlarged mediastinal lymph nodes, as above, with no hypermetabolic activity.  Metastatic involvement is not excluded.  3. Additional CT findings, as above.     CT head 20:  Impression:     No evidence of acute intracranial pathology.     Moderate patchy mucoperiosteal thickening in the paranasal sinuses.    CT chest 2021:  Development of central cavitation in previously described 12 mm right upper lobe pulmonary nodule which may be inflammatory in nature including tuberculous and non tuberculous mycobacterial infection, fungal infection etc.  Neoplasm is also included in the differential.     Improved subpleural right upper lobe nodular opacity and stable ground-glass nodule  right apex.     Stable calcified granuloma and postinflammatory changes in the lingula probably related to previous non tuberculous mycobacterial infection.     Severe atherosclerosis and severe hepatic steatosis            Assessment and Plan:      1. Recurrent adenocarcinoma of left lung    2. Iron deficiency anemia due to chronic blood loss    3. Chronic respiratory failure with hypoxia          Recurrent lung cancer   Pt had adenocarcinoma which was treated with SBRT in Oct/Nov 2020 with Dr Mead. Recent imaging with CT and PET concerning for recurrence and jamal involvement. She is poor surgical candidate. We discussed weekly chemo along with radiation. If pt is not able to tolerate the chemo, will dose reduce or discontinue chemo. Pt understands the risks and benefits and prefers to try chemo with radiation with the understanding that if treatment becomes too uncomfortable we will hold chemo and pursue with radiation alone.   Had repeat biopsy procedure, couldn't get tissue, had PTX, req chest tube  Repeat imaging concerning for recurrent and progression of ds. Case d/w Dr Mead , concurrent chemo radiation could have been tried, however pt has transportation issues. Dr Mead planning for hypo fractionated course of radiation. Due to performance status and cor morbidities pt is not a candidate for Q3 week chemotherapy.   Will discuss management options with pt. Plan to guardant 360 next visit.     LESLY- on venofer, monitor    Pancreatic lesion- being followed by Dr Cheema.         Orders Placed This Encounter    CBC Auto Differential    Comprehensive Metabolic Panel    Ferritin    Iron and TIBC    Prior Authorization (Change to Plan)       Future Appointments   Date Time Provider Department Center   11/4/2022  2:00 PM SIMULATION, RADIATION [facility]   12/12/2022  1:00 PM J[facility]   1/4/2023  1:00 PM Yane Sahni MD [facility] IM [facility]   3/20/2023 10:30 AM  Parvez Ortez MD [facility] CARDIO [facility]     The patient location is: [place]  The chief complaint leading to consultation is: see chief complaint below    Visit type: audiovisual    Face to Face time with patient: 10  15  minutes of total time spent on the encounter, which includes face to face time and non-face to face time preparing to see the patient (eg, review of tests), Obtaining and/or reviewing separately obtained history, Documenting clinical information in the electronic or other health record, Independently interpreting results (not separately reported) and communicating results to the patient/family/caregiver, or Care coordination (not separately reported).         Each patient to whom he or she provides medical services by telemedicine is:  (1) informed of the relationship between the physician and patient and the respective role of any other health care provider with respect to management of the patient; and (2) notified that he or she may decline to receive medical services by telemedicine and may withdraw from such care at any time.

## 2022-10-28 NOTE — PROGRESS NOTES
10/28/2022    Radiation Oncology Follow-Up Visit    Prior Radiation History:    Site  Technique  Energy  Dose/Fx (Gy)  #Fx  Total Dose (Gy)  Start Date  End Date  Elapsed Days    RUL Lung - 2 PTV  SBRT  6X  11  5 / 5  55  10/29/2020  11/4/2020  6        Assessment   This is a 72 y.o. y/o female with likely synchronous primary Stage IA2 (cT1b cN0 M0) RUL NSCLC (adeno; no actionable mutations) diagnosed on biopsy of the spiculated nodule 9/14/20. In addition there was a slightly more superior, pleural-based lesion that increased in size and solidity. She was not a candidate for resection due to COPD and CHF. She completed SBRT 55 Gy in 5 fractions to both RUL nodules on 11/4/20.     No late toxicity from treatment. Attempted biopsy of growing, JASSI nodule 9/12/22 was non-diagnostic and resulted in PTX requiring chest tube. Prior PET 7/22/22 demonstrated minimal FDG uptake, but was concerning due to growth and borderline mediastinal adenopathy. CT Chest today 10/28/22 demonstrates stable or maybe slight decrease in size of the previously growing JASSI nodule; however, the station 6 para-aortic node continues to increase in size.    I am concerned that she has cancer that has spread to mediastinal node, but unfortunately it is in a location that is not amenable to tissue diagnosis. Given that she has a history of adenocarcinoma as well as a growing mediastinal node, I discussed empiric treatment vs continued close surveillance. She would like to proceed with treatment.     Given her performance status and severe COPD, I discussed treating the mediastinal node and JASSI nodule with hypofractionated radiation 52.5-60 Gy in 15 fractions. She may also benefit from chemotherapy, which I believe could be given sequentially either before or after radiation. Potential side effects of radiation therapy to the chest were reviewed including fatigue, pneumonitis, chest wall pain, and fibrosis. At the end of our discussion, she wished to  proceed with the recommended treatment.        Plan   1)   Schedule CT Simulation for radiation treatment planning.   2) Will discuss sequencing of chemo and radiation with Dr. Gilmore.        Chief Complaint   Patient presents with    Lung Cancer     F/u after ct         HPI: Since her last clinic visit with me, she underwent lung biopsy 9/12/22 that was non-diagnostic. She developed a pneumothorax requiring chest tube, so I do not recommend putting her through another attempt. Prior review at the Thoracic Multidisciplinary Tumor Conference with input from Pulmonology did not believe her disease was accessible by bronch/EBUS. We discussed her case at the Thoracic multidisciplinary conference on 9/20/22 with recommendation for short-interval CT follow-up (obtained today) and consideration of empiric chemo-RT.     In clinic today, she is unaccompanied. She feels that her breathing has nearly returned to baseline since post-biopsy pneumo. Denies fevers, weight loss, or chest pain.       Past Medical History:   Diagnosis Date    Anxiety     Cervical spinal stenosis     Choledocholithiasis     Chronic obstructive pulmonary disease 03/16/2016    Degenerative disc disease of cervical spine     Diverticulosis 04/24/2018    History of alcohol abuse 03/02/2021    History of gastric ulcer     History of L3 compression fracture 12/19/2018    History of lung cancer     s/p completion of XRT in 10/2020    Hyperlipidemia     Iron deficiency anemia     Osteoarthritis     Stage III CKD 2017       Past Surgical History:   Procedure Laterality Date    BREAST CYST EXCISION Right     1967    CATARACT EXTRACTION      COLONOSCOPY  2015    COLONOSCOPY N/A 6/8/2022    Procedure: COLONOSCOPY;  Surgeon: Helio Cheema MD;  Location: [address]);  Service: Endoscopy;  Laterality: N/A;  5/25-Pt requesting Dr. Cheema-approval given per Dr. Cheema-ml  Fully vaccinated, prep instr portal -ml    CORONARY ANGIOGRAPHY N/A 7/20/2018     Procedure: ANGIOGRAM, CORONARY ARTERY;  Surgeon: Willard Roberts MD;  Location: [facility] CATH LAB;  Service: Cardiovascular;  Laterality: N/A;    ENDOSCOPIC ULTRASOUND OF UPPER GASTROINTESTINAL TRACT N/A 3/24/2021    Procedure: ULTRASOUND, UPPER GI TRACT, ENDOSCOPIC;  Surgeon: Helio Cheema MD;  Location: [facility] ENDO (2ND FLR);  Service: Endoscopy;  Laterality: N/A;    ENDOSCOPIC ULTRASOUND OF UPPER GASTROINTESTINAL TRACT N/A 4/25/2022    Procedure: ULTRASOUND, UPPER GI TRACT, ENDOSCOPIC;  Surgeon: Helio Cheema MD;  Location: [facility] ENDO (2ND FLR);  Service: Endoscopy;  Laterality: N/A;  cardiac risk assessment 1 per Dr. Ortez. see t/e 3/17-SC  3/25:new instructions via portal. home with medical transport?-SC    ERCP N/A 3/24/2021    Procedure: ERCP (ENDOSCOPIC RETROGRADE CHOLANGIOPANCREATOGRAPHY);  Surgeon: Helio Cheema MD;  Location: [facility] (2ND FLR);  Service: Endoscopy;  Laterality: N/A;    ERCP N/A 4/30/2021    Procedure: ERCP (ENDOSCOPIC RETROGRADE CHOLANGIOPANCREATOGRAPHY);  Surgeon: Boo Gray MD;  Location: [facility] ENDO (2ND FLR);  Service: Endoscopy;  Laterality: N/A;    ERCP N/A 7/1/2021    Procedure: ERCP (ENDOSCOPIC RETROGRADE CHOLANGIOPANCREATOGRAPHY);  Surgeon: Helio Cheema MD;  Location: [facility] ENDO (2ND FLR);  Service: Endoscopy;  Laterality: N/A;  Ok for Taxi. Dr Cheema  rapid covid 1130am- tb inst email    ESOPHAGOGASTRODUODENOSCOPY  2015    ESOPHAGOGASTRODUODENOSCOPY N/A 3/4/2021    Procedure: EGD (ESOPHAGOGASTRODUODENOSCOPY);  Surgeon: Yenifer Ramirez MD;  Location: [facility] ENDO;  Service: Endoscopy;  Laterality: N/A;    ESOPHAGOGASTRODUODENOSCOPY N/A 3/24/2021    Procedure: EGD (ESOPHAGOGASTRODUODENOSCOPY);  Surgeon: Helio Cheema MD;  Location: [facility] ENDO (2ND FLR);  Service: Endoscopy;  Laterality: N/A;    EYE SURGERY  2016    Cataracts    FRACTURE SURGERY  2014    JOINT REPLACEMENT  2007    ORIF FEMUR FRACTURE Left     TOTAL KNEE ARTHROPLASTY Left 2007    TUBAL  LIGATION         Social History     Tobacco Use    Smoking status: Some Days     Packs/day: 1.00     Years: 53.00     Pack years: 53.00     Types: Cigarettes     Start date: 4/30/1967    Smokeless tobacco: Never    Tobacco comments:     I had quit for about 6 months this past year. Then massive stress and started back up sometimes   Substance Use Topics    Alcohol use: Yes     Alcohol/week: 7.0 standard drinks     Types: 7 Drinks containing 0.5 oz of alcohol per week     Comment: at least 1 cocktail a day    Drug use: No       Cancer-related family history includes Cancer in her sister; Rectal cancer in her father. There is no history of Melanoma or Breast cancer.    Current Outpatient Medications on File Prior to Visit   Medication Sig Dispense Refill    albuterol (VENTOLIN HFA) 90 mcg/actuation inhaler inhale 1-2 puffs as needed every 6 hours for wheezing and shortness of breath 25.5 g 11    ALPRAZolam (XANAX) 0.25 MG tablet Take 1 tablet (0.25 mg total) by mouth daily as needed for Anxiety. 30 tablet 1    atorvastatin (LIPITOR) 40 MG tablet TAKE 1 TABLET BY MOUTH EVERY DAILY 90 tablet 3    b complex vitamins capsule Take 1 capsule by mouth once daily.      cyanocobalamin, vitamin B-12, 1,000 mcg TbSR Take 1,000 mcg by mouth once daily.      denosumab (PROLIA) 60 mg/mL Syrg Inject 1 mL (60 mg total) into the skin every 6 (six) months. 2 mL 0    DULoxetine (CYMBALTA) 60 MG capsule Take 1 capsule (60 mg total) by mouth once daily. 90 capsule 2    gabapentin (NEURONTIN) 300 MG capsule Take 1 capsule (300 mg total) by mouth 3 (three) times daily. 90 capsule 11    guaiFENesin (MUCINEX) 600 mg 12 hr tablet Take 1,200 mg by mouth 2 (two) times daily as needed for Congestion.      HYDROcodone-acetaminophen (NORCO)  mg per tablet Take 1 tablet by mouth every 12 (twelve) hours as needed for Pain. 60 tablet 0    losartan (COZAAR) 100 MG tablet Take 1 tablet (100 mg total) by mouth once daily. 90 tablet 3    magnesium  oxide (MAG-OX) 400 mg (241.3 mg magnesium) tablet Take 1 tablet by mouth every 12 (twelve) hours. 30 tablet 0    metoprolol succinate (TOPROL-XL) 25 MG 24 hr tablet Take 2 tablets (50 mg total) by mouth once daily. 60 tablet 11    multivit-min/iron/folic/lutein (CENTRUM SILVER WOMEN ORAL) Take 1 tablet by mouth once daily.      pantoprazole (PROTONIX) 40 MG tablet Take 1 tablet (40 mg total) by mouth once daily. 90 tablet 3    torsemide (DEMADEX) 20 MG Tab Take 1 tablet (20 mg total) by mouth once daily. 90 tablet 3    traZODone (DESYREL) 100 MG tablet Take 1 tablet (100 mg total) by mouth every evening. 30 tablet 2    umeclidinium (INCRUSE ELLIPTA) 62.5 mcg/actuation inhalation capsule Inhale 1 puff into the lungs once daily. Controller 90 each 3    vitamin D (VITAMIN D3) 1000 units Tab Take 1 tablet (1,000 Units total) by mouth once daily. 30 tablet 2    ZINC ORAL Take by mouth.      azelastine (ASTELIN) 137 mcg (0.1 %) nasal spray Instill 1 spray (137 mcg total) by Nasal route 2 (two) times daily. 30 mL 3    calcium carbonate (OS-SANDRINE) 500 mg calcium (1,250 mg) tablet Take 2 tablets (1,000 mg total) by mouth 2 (two) times daily. 30 tablet 0    fluticasone (FLONASE) 50 mcg/actuation nasal spray 1 spray by Each Nare route 2 (two) times daily as needed for Rhinitis.      fluticasone propion-salmeterol 115-21 mcg/dose (ADVAIR HFA) 115-21 mcg/actuation HFAA inhaler Inhale 2 puffs into the lungs every 12 (twelve) hours. 36 g 3    potassium chloride (MICRO-K) 10 MEQ CpSR Take 10 mEq by mouth.       No current facility-administered medications on file prior to visit.       Review of patient's allergies indicates:   Allergen Reactions    Wellbutrin [bupropion hcl] Other (See Comments)     Hyponatremia and hypomagnesia     Zoloft [sertraline] Other (See Comments)     Hyponatremia and hypomagnesia     Bananas [banana]      Emesis, and stomach cramps       Review of Systems   Constitutional:  Negative for fever,  malaise/fatigue and weight loss.   HENT:  Positive for congestion. Negative for ear pain and sore throat.    Eyes:  Negative for blurred vision and double vision.   Respiratory:  Positive for cough, shortness of breath and wheezing. Negative for hemoptysis.    Cardiovascular:  Negative for chest pain and leg swelling.   Gastrointestinal:  Negative for abdominal pain, constipation, diarrhea, heartburn and nausea.   Genitourinary:  Negative for dysuria and hematuria.   Musculoskeletal:  Positive for back pain, joint pain and neck pain. Negative for falls.   Skin:  Positive for itching.   Neurological:  Negative for tingling, speech change, focal weakness, seizures and headaches.   Psychiatric/Behavioral:  Positive for depression. The patient is nervous/anxious.       Vital Signs: BP (!) 115/58   Pulse 97   Resp 20   SpO2 98%     ECOG Performance Status: 2 - Ambulates, capable of self care only    Physical Exam  Vitals reviewed.   Constitutional:       Appearance: Normal appearance.   HENT:      Head: Normocephalic and atraumatic.   Eyes:      General: No scleral icterus.     Extraocular Movements: Extraocular movements intact.   Pulmonary:      Effort: No accessory muscle usage or respiratory distress.   Abdominal:      General: There is no distension.   Musculoskeletal:         General: Normal range of motion.      Cervical back: Normal range of motion and neck supple.   Lymphadenopathy:      Cervical: No cervical adenopathy.   Skin:     General: Skin is dry.      Coloration: Skin is not jaundiced.   Neurological:      Mental Status: She is alert and oriented to person, place, and time.      Cranial Nerves: No cranial nerve deficit.      Comments: In wheelchair   Psychiatric:         Mood and Affect: Mood and affect normal.         Judgment: Judgment normal.        Labs:    Imaging: I have personally reviewed the patient's available images and reports and summarized pertinent findings above in HPI.     Pathology:  No new path

## 2022-10-28 NOTE — Clinical Note
Woody Juarez, you are meeting with her by virtual this afternoon. I am concerned that the mediastinal node continues to enlarge since last year. I offered continuing to follow vs treating, and she would like to proceed with treatment. Given her ECOG and transportation needs, I'm recommending hypofractionated RT over 15 fractions. That is not typically given concurrent with chemo. Would you consider adjuvant chemo after we finish radiation?

## 2022-10-28 NOTE — PLAN OF CARE
START OFF PATHWAY REGIMEN - Non-Small Cell Lung            Paclitaxel       Carboplatin           Additional Orders: Given with concurrent chemoradiotherapy.  GCSF   therapy with RT is a relative contraindication.    **Always confirm dose/schedule in your pharmacy ordering system**    Patient Characteristics:  Local Recurrence  Therapeutic Status: Local Recurrence  Intent of Therapy:  Curative Intent, Discussed with Patient

## 2022-11-01 ENCOUNTER — APPOINTMENT (OUTPATIENT)
Dept: RADIATION THERAPY | Facility: OTHER | Age: 72
End: 2022-11-01
Attending: RADIOLOGY
Payer: MEDICARE

## 2022-11-04 ENCOUNTER — HOSPITAL ENCOUNTER (OUTPATIENT)
Dept: RADIATION THERAPY | Facility: HOSPITAL | Age: 72
Discharge: HOME | End: 2022-11-04
Attending: RADIOLOGY
Payer: MEDICARE

## 2022-11-04 PROCEDURE — 77334 RADIATION TREATMENT AID(S): CPT | Mod: TC | Performed by: RADIOLOGY

## 2022-11-04 PROCEDURE — 77263 THER RADIOLOGY TX PLNG CPLX: CPT | Mod: ,,, | Performed by: RADIOLOGY

## 2022-11-04 PROCEDURE — 77290 PR  SET RADN THERAPY FIELD COMPLEX: ICD-10-PCS | Mod: 26,,, | Performed by: RADIOLOGY

## 2022-11-04 PROCEDURE — 77290 THER RAD SIMULAJ FIELD CPLX: CPT | Mod: TC | Performed by: RADIOLOGY

## 2022-11-04 PROCEDURE — 77290 THER RAD SIMULAJ FIELD CPLX: CPT | Mod: 26,,, | Performed by: RADIOLOGY

## 2022-11-04 PROCEDURE — 77263 PR  RADIATION THERAPY PLAN COMPLEX: ICD-10-PCS | Mod: ,,, | Performed by: RADIOLOGY

## 2022-11-04 PROCEDURE — 77014 HC CT GUIDANCE RADIATION THERAPY FLDS PLACEMENT: CPT | Mod: TC | Performed by: RADIOLOGY

## 2022-11-04 PROCEDURE — 77334 PR  RADN TREATMENT AID(S) COMPLX: ICD-10-PCS | Mod: 26,,, | Performed by: RADIOLOGY

## 2022-11-04 PROCEDURE — 77334 RADIATION TREATMENT AID(S): CPT | Mod: 26,,, | Performed by: RADIOLOGY

## 2022-11-07 ENCOUNTER — OFFICE VISIT (OUTPATIENT)
Dept: HEMATOLOGY/ONCOLOGY | Facility: CLINIC | Age: 72
End: 2022-11-07
Payer: MEDICARE

## 2022-11-07 ENCOUNTER — DOCUMENTATION ONLY (OUTPATIENT)
Dept: HEMATOLOGY/ONCOLOGY | Facility: CLINIC | Age: 72
End: 2022-11-07
Payer: MEDICARE

## 2022-11-07 ENCOUNTER — LAB VISIT (OUTPATIENT)
Dept: LAB | Facility: OTHER | Age: 72
End: 2022-11-07
Attending: INTERNAL MEDICINE
Payer: MEDICARE

## 2022-11-07 VITALS
DIASTOLIC BLOOD PRESSURE: 66 MMHG | HEART RATE: 97 BPM | OXYGEN SATURATION: 96 % | SYSTOLIC BLOOD PRESSURE: 137 MMHG | TEMPERATURE: 99 F

## 2022-11-07 DIAGNOSIS — C34.92 RECURRENT ADENOCARCINOMA OF LEFT LUNG: Primary | ICD-10-CM

## 2022-11-07 DIAGNOSIS — D50.0 IRON DEFICIENCY ANEMIA DUE TO CHRONIC BLOOD LOSS: ICD-10-CM

## 2022-11-07 DIAGNOSIS — C34.92 RECURRENT ADENOCARCINOMA OF LEFT LUNG: ICD-10-CM

## 2022-11-07 DIAGNOSIS — J96.11 CHRONIC RESPIRATORY FAILURE WITH HYPOXIA: ICD-10-CM

## 2022-11-07 LAB
ALBUMIN SERPL BCP-MCNC: 3 G/DL (ref 3.5–5.2)
ALP SERPL-CCNC: 94 U/L (ref 55–135)
ALT SERPL W/O P-5'-P-CCNC: 9 U/L (ref 10–44)
ANION GAP SERPL CALC-SCNC: 11 MMOL/L (ref 8–16)
AST SERPL-CCNC: 10 U/L (ref 10–40)
BASOPHILS # BLD AUTO: 0.02 K/UL (ref 0–0.2)
BASOPHILS NFR BLD: 0.3 % (ref 0–1.9)
BILIRUB SERPL-MCNC: 0.2 MG/DL (ref 0.1–1)
BUN SERPL-MCNC: 23 MG/DL (ref 8–23)
CALCIUM SERPL-MCNC: 9.4 MG/DL (ref 8.7–10.5)
CHLORIDE SERPL-SCNC: 101 MMOL/L (ref 95–110)
CO2 SERPL-SCNC: 22 MMOL/L (ref 23–29)
CREAT SERPL-MCNC: 1.1 MG/DL (ref 0.5–1.4)
DIFFERENTIAL METHOD: ABNORMAL
EOSINOPHIL # BLD AUTO: 0.2 K/UL (ref 0–0.5)
EOSINOPHIL NFR BLD: 2.4 % (ref 0–8)
ERYTHROCYTE [DISTWIDTH] IN BLOOD BY AUTOMATED COUNT: 14.3 % (ref 11.5–14.5)
EST. GFR  (NO RACE VARIABLE): 53 ML/MIN/1.73 M^2
GLUCOSE SERPL-MCNC: 113 MG/DL (ref 70–110)
HCT VFR BLD AUTO: 29.5 % (ref 37–48.5)
HGB BLD-MCNC: 9.6 G/DL (ref 12–16)
IMM GRANULOCYTES # BLD AUTO: 0.02 K/UL (ref 0–0.04)
IMM GRANULOCYTES NFR BLD AUTO: 0.3 % (ref 0–0.5)
LYMPHOCYTES # BLD AUTO: 1.5 K/UL (ref 1–4.8)
LYMPHOCYTES NFR BLD: 18.8 % (ref 18–48)
MCH RBC QN AUTO: 31.1 PG (ref 27–31)
MCHC RBC AUTO-ENTMCNC: 32.5 G/DL (ref 32–36)
MCV RBC AUTO: 96 FL (ref 82–98)
MONOCYTES # BLD AUTO: 0.8 K/UL (ref 0.3–1)
MONOCYTES NFR BLD: 9.6 % (ref 4–15)
NEUTROPHILS # BLD AUTO: 5.5 K/UL (ref 1.8–7.7)
NEUTROPHILS NFR BLD: 68.6 % (ref 38–73)
NRBC BLD-RTO: 0 /100 WBC
PLATELET # BLD AUTO: 225 K/UL (ref 150–450)
PMV BLD AUTO: 10.3 FL (ref 9.2–12.9)
POTASSIUM SERPL-SCNC: 4 MMOL/L (ref 3.5–5.1)
PROT SERPL-MCNC: 6.2 G/DL (ref 6–8.4)
RBC # BLD AUTO: 3.09 M/UL (ref 4–5.4)
SODIUM SERPL-SCNC: 134 MMOL/L (ref 136–145)
WBC # BLD AUTO: 7.99 K/UL (ref 3.9–12.7)

## 2022-11-07 PROCEDURE — 3075F PR MOST RECENT SYSTOLIC BLOOD PRESS GE 130-139MM HG: ICD-10-PCS | Mod: CPTII,S$GLB,, | Performed by: STUDENT IN AN ORGANIZED HEALTH CARE EDUCATION/TRAINING PROGRAM

## 2022-11-07 PROCEDURE — 4010F PR ACE/ARB THEARPY RXD/TAKEN: ICD-10-PCS | Mod: CPTII,S$GLB,, | Performed by: STUDENT IN AN ORGANIZED HEALTH CARE EDUCATION/TRAINING PROGRAM

## 2022-11-07 PROCEDURE — 3075F SYST BP GE 130 - 139MM HG: CPT | Mod: CPTII,S$GLB,, | Performed by: STUDENT IN AN ORGANIZED HEALTH CARE EDUCATION/TRAINING PROGRAM

## 2022-11-07 PROCEDURE — 1125F AMNT PAIN NOTED PAIN PRSNT: CPT | Mod: CPTII,S$GLB,, | Performed by: STUDENT IN AN ORGANIZED HEALTH CARE EDUCATION/TRAINING PROGRAM

## 2022-11-07 PROCEDURE — 99215 OFFICE O/P EST HI 40 MIN: CPT | Mod: S$GLB,,, | Performed by: STUDENT IN AN ORGANIZED HEALTH CARE EDUCATION/TRAINING PROGRAM

## 2022-11-07 PROCEDURE — 1101F PT FALLS ASSESS-DOCD LE1/YR: CPT | Mod: CPTII,S$GLB,, | Performed by: STUDENT IN AN ORGANIZED HEALTH CARE EDUCATION/TRAINING PROGRAM

## 2022-11-07 PROCEDURE — 99999 PR PBB SHADOW E&M-EST. PATIENT-LVL III: CPT | Mod: PBBFAC,,, | Performed by: STUDENT IN AN ORGANIZED HEALTH CARE EDUCATION/TRAINING PROGRAM

## 2022-11-07 PROCEDURE — 3288F FALL RISK ASSESSMENT DOCD: CPT | Mod: CPTII,S$GLB,, | Performed by: STUDENT IN AN ORGANIZED HEALTH CARE EDUCATION/TRAINING PROGRAM

## 2022-11-07 PROCEDURE — 3288F PR FALLS RISK ASSESSMENT DOCUMENTED: ICD-10-PCS | Mod: CPTII,S$GLB,, | Performed by: STUDENT IN AN ORGANIZED HEALTH CARE EDUCATION/TRAINING PROGRAM

## 2022-11-07 PROCEDURE — 1101F PR PT FALLS ASSESS DOC 0-1 FALLS W/OUT INJ PAST YR: ICD-10-PCS | Mod: CPTII,S$GLB,, | Performed by: STUDENT IN AN ORGANIZED HEALTH CARE EDUCATION/TRAINING PROGRAM

## 2022-11-07 PROCEDURE — 82728 ASSAY OF FERRITIN: CPT | Performed by: STUDENT IN AN ORGANIZED HEALTH CARE EDUCATION/TRAINING PROGRAM

## 2022-11-07 PROCEDURE — 3044F PR MOST RECENT HEMOGLOBIN A1C LEVEL <7.0%: ICD-10-PCS | Mod: CPTII,S$GLB,, | Performed by: STUDENT IN AN ORGANIZED HEALTH CARE EDUCATION/TRAINING PROGRAM

## 2022-11-07 PROCEDURE — 80053 COMPREHEN METABOLIC PANEL: CPT | Performed by: STUDENT IN AN ORGANIZED HEALTH CARE EDUCATION/TRAINING PROGRAM

## 2022-11-07 PROCEDURE — 84466 ASSAY OF TRANSFERRIN: CPT | Performed by: STUDENT IN AN ORGANIZED HEALTH CARE EDUCATION/TRAINING PROGRAM

## 2022-11-07 PROCEDURE — 1160F RVW MEDS BY RX/DR IN RCRD: CPT | Mod: CPTII,S$GLB,, | Performed by: STUDENT IN AN ORGANIZED HEALTH CARE EDUCATION/TRAINING PROGRAM

## 2022-11-07 PROCEDURE — 1159F PR MEDICATION LIST DOCUMENTED IN MEDICAL RECORD: ICD-10-PCS | Mod: CPTII,S$GLB,, | Performed by: STUDENT IN AN ORGANIZED HEALTH CARE EDUCATION/TRAINING PROGRAM

## 2022-11-07 PROCEDURE — 36415 COLL VENOUS BLD VENIPUNCTURE: CPT | Performed by: STUDENT IN AN ORGANIZED HEALTH CARE EDUCATION/TRAINING PROGRAM

## 2022-11-07 PROCEDURE — 3044F HG A1C LEVEL LT 7.0%: CPT | Mod: CPTII,S$GLB,, | Performed by: STUDENT IN AN ORGANIZED HEALTH CARE EDUCATION/TRAINING PROGRAM

## 2022-11-07 PROCEDURE — 3078F DIAST BP <80 MM HG: CPT | Mod: CPTII,S$GLB,, | Performed by: STUDENT IN AN ORGANIZED HEALTH CARE EDUCATION/TRAINING PROGRAM

## 2022-11-07 PROCEDURE — 85025 COMPLETE CBC W/AUTO DIFF WBC: CPT | Performed by: STUDENT IN AN ORGANIZED HEALTH CARE EDUCATION/TRAINING PROGRAM

## 2022-11-07 PROCEDURE — 1125F PR PAIN SEVERITY QUANTIFIED, PAIN PRESENT: ICD-10-PCS | Mod: CPTII,S$GLB,, | Performed by: STUDENT IN AN ORGANIZED HEALTH CARE EDUCATION/TRAINING PROGRAM

## 2022-11-07 PROCEDURE — 99215 PR OFFICE/OUTPT VISIT, EST, LEVL V, 40-54 MIN: ICD-10-PCS | Mod: S$GLB,,, | Performed by: STUDENT IN AN ORGANIZED HEALTH CARE EDUCATION/TRAINING PROGRAM

## 2022-11-07 PROCEDURE — 1160F PR REVIEW ALL MEDS BY PRESCRIBER/CLIN PHARMACIST DOCUMENTED: ICD-10-PCS | Mod: CPTII,S$GLB,, | Performed by: STUDENT IN AN ORGANIZED HEALTH CARE EDUCATION/TRAINING PROGRAM

## 2022-11-07 PROCEDURE — 99999 PR PBB SHADOW E&M-EST. PATIENT-LVL III: ICD-10-PCS | Mod: PBBFAC,,, | Performed by: STUDENT IN AN ORGANIZED HEALTH CARE EDUCATION/TRAINING PROGRAM

## 2022-11-07 PROCEDURE — 3078F PR MOST RECENT DIASTOLIC BLOOD PRESSURE < 80 MM HG: ICD-10-PCS | Mod: CPTII,S$GLB,, | Performed by: STUDENT IN AN ORGANIZED HEALTH CARE EDUCATION/TRAINING PROGRAM

## 2022-11-07 PROCEDURE — 1159F MED LIST DOCD IN RCRD: CPT | Mod: CPTII,S$GLB,, | Performed by: STUDENT IN AN ORGANIZED HEALTH CARE EDUCATION/TRAINING PROGRAM

## 2022-11-07 PROCEDURE — 4010F ACE/ARB THERAPY RXD/TAKEN: CPT | Mod: CPTII,S$GLB,, | Performed by: STUDENT IN AN ORGANIZED HEALTH CARE EDUCATION/TRAINING PROGRAM

## 2022-11-07 RX ORDER — ASCORBIC ACID 250 MG
250 TABLET ORAL DAILY
Status: ON HOLD | COMMUNITY
End: 2023-06-15

## 2022-11-07 RX ORDER — PERPHENAZINE/AMITRIPTYLINE HCL 4 MG-25 MG
TABLET ORAL
Status: ON HOLD | COMMUNITY
End: 2023-06-15

## 2022-11-07 RX ORDER — BIOTIN 10 MG
10 TABLET ORAL DAILY
Status: ON HOLD | COMMUNITY
End: 2023-06-15

## 2022-11-07 NOTE — PROGRESS NOTES
Hematology - oncology PROGRESS NOTE     Subjective:       Patient ID: Nalini Varma is a 71 y.o. female.     Chief Complaint: follow up for chronic anemia     Diagnosis:  1. Anemia of chronic disease  2. Stage I Lung adenocarcinoma of right upper lobe, lepidic pattern, s/p SBRT in Oct/Nov 2021     Oncologic History:  1. Ms Varma is a 70 yo woman with chronic pain, anxiety, COPD on 2 liters of home oxygen, CHF with preserved ejection fraction, HTN, CKD stage 3 (creatinine 1.6), who presents for further evaluation of newly diagnosed lung adenocarcinoma. She underwent a screening CT chest on 7/23/20, which showed a 0.6 cm right upper lobe noncalcified pulmonary nodule which measured 0.4 cm on previous CT dated 03/20/2019.  There is a pleural based 1.2 cm pulmonary nodule within the right upper lobe which measured 0.9 cm on previous CT.  There is a 1.2 cm partially spiculated right upper lobe noncalcified pulmonary nodule which measured 0.6 cm on previous CT. She underwent a right lung biopsy on 9/4/2020. Pathology showed adenocarcinoma, well differentiated, lepidic pattern, cannot rule out an invasive component. She presents today for further evaluation. Has been smoking 2PPD for 50 years. Enrolled in smoking cessation program. Now down to 1PPD. Has been on 2L O2 for many years. Needs to use 3 liters when she is wearing a mask. Has CHF. On torsemide. Uses two pillows at night. Unable to walk for a block due to dyspnea. No weight loss. Lives by herself  2. CT head negative for metastases. PET scan 10/7/20: Two right upper lobe nodule with no appreciable activity.  Please note that FDG PET has poor sensitivity for small, well differentiated, and/or indolent malignancies.  An additional subcentimeter node in the apex is too small to characterize by PET for which attention on follow-up is recommended. Mildly enlarged mediastinal lymph nodes, as above, with no hypermetabolic activity.  Metastatic  involvement is not excluded.  3. Seen by Dr Mead. Underwent SBRT 10/29/21-21       Interval History:   No new complains.   PET scan shows concern for possible progression. She denied any chest pain or worsening sob. She uses 2-3 L oxygen at home. She denied any hemoptysis.  Underwent biopsy , couldn't get tissues. Had pneumothorax. Req chest tube . Was in hospital   Started on iron infusions, tolerated well    stable chronic dyspnea with exertion. Wearing oxygen.     ECO     ROS:   A ten-point system review is obtained and negative except for what was stated in the Interval History.      Physical Examination:   /66 (BP Location: Left arm, Patient Position: Sitting, BP Method: Large (Automatic))   Pulse 97   Temp 98.8 °F (37.1 °C)   SpO2 96%     Physical Exam  Vitals and nursing note reviewed.   Constitutional:       General: She is not in acute distress.  HENT:      Head: Normocephalic and atraumatic.      Mouth/Throat:      Pharynx: No oropharyngeal exudate.   Eyes:      General: No scleral icterus.        Right eye: No discharge.         Left eye: No discharge.   Neck:      Thyroid: No thyromegaly.      Trachea: No tracheal deviation.   Cardiovascular:      Rate and Rhythm: Normal rate and regular rhythm.      Heart sounds: Normal heart sounds. No murmur heard.  Pulmonary:      Effort: Pulmonary effort is normal. No respiratory distress.      Breath sounds: Normal breath sounds. Decreased air movement present. No wheezing or rales.   Abdominal:      General: Bowel sounds are normal. There is no distension.      Palpations: Abdomen is soft.      Tenderness: There is no abdominal tenderness.   Musculoskeletal:         General: No tenderness. Normal range of motion.      Cervical back: Normal range of motion and neck supple.   Lymphadenopathy:      Cervical: No cervical adenopathy.   Skin:     General: Skin is warm and dry.      Findings: No rash.   Neurological:      Mental Status: She is alert  and oriented to person, place, and time.      Cranial Nerves: No cranial nerve deficit.      Gait: Gait is intact.   Psychiatric:         Mood and Affect: Mood and affect normal.       Objective:      Laboratory Data:  Labs reviewed.      Imaging Data:  PET scan 10/7/2020:  Impression:     1. Two right upper lobe nodule with no appreciable activity.  Please note that FDG PET has poor sensitivity for small, well differentiated, and/or indolent malignancies.  An additional subcentimeter node in the apex is too small to characterize by PET for which attention on follow-up is recommended.  2. Mildly enlarged mediastinal lymph nodes, as above, with no hypermetabolic activity.  Metastatic involvement is not excluded.  3. Additional CT findings, as above.     CT head 9/25/20:  Impression:     No evidence of acute intracranial pathology.     Moderate patchy mucoperiosteal thickening in the paranasal sinuses.    CT chest 2/11/2021:  Development of central cavitation in previously described 12 mm right upper lobe pulmonary nodule which may be inflammatory in nature including tuberculous and non tuberculous mycobacterial infection, fungal infection etc.  Neoplasm is also included in the differential.     Improved subpleural right upper lobe nodular opacity and stable ground-glass nodule right apex.     Stable calcified granuloma and postinflammatory changes in the lingula probably related to previous non tuberculous mycobacterial infection.     Severe atherosclerosis and severe hepatic steatosis            Assessment and Plan:      1. Recurrent adenocarcinoma of left lung    2. Chronic respiratory failure with hypoxia    3. Iron deficiency anemia due to chronic blood loss          Recurrent lung cancer   Pt had adenocarcinoma which was treated with SBRT in Oct/Nov 2020 with Dr Mead. Recent imaging with CT and PET concerning for recurrence and jamla involvement. She is poor surgical candidate. We discussed weekly chemo along  with radiation. If pt is not able to tolerate the chemo, will dose reduce or discontinue chemo. Pt understands the risks and benefits and prefers to try chemo with radiation with the understanding that if treatment becomes too uncomfortable we will hold chemo and pursue with radiation alone.   Had repeat biopsy procedure, couldn't get tissue, had PTX, req chest tube  Repeat imaging concerning for recurrent and progression of ds. Case d/w Dr Mead over phone on 11/7/22, concurrent chemo radiation to be tried. Plan for weekly carbo taxol . If pt is unable to tolerate chemo or has transportation issues, Dr Mead plans to change back to hypo fractionated course of radiation. Due to performance status and cor morbidities pt is not a candidate for Q3 week chemotherapy.   Will discuss management options with pt. Plan to guardant 360 next visit.     LESLY- on venofer, monitor. Pt to get labs on her way out    Pancreatic lesion- being followed by Dr Cheema.     **The side effects of chemotherapy were discussed with patient, which include but are not limited to hair loss, fatigue, decreased appetite, weight loss, weakness, bone marrow suppression, increased risk of infection, sepsis, anemia that may require blood transfusion, low platelet counts with increased risk of bleeding that may require platelet transfusion, diarrhea, constipation, mucositis, damage to the brain, heart, liver, kidney, damage to the nerves that may not be reversible, severe allergic reaction, damage at the injection site, sterility, secondary cancer, death. Handouts provided to patient.  **Patient understands the risks and wants to proceed with chemotherapy. Consent signed in the office.       Orders Placed This Encounter    CBC Oncology    Comprehensive Metabolic Panel    Magnesium   I spent >40  mins on reviewing epic chart notes, reviewing tests, nursing concerns,obtaining history, performing physical exam, counseling and educating  patient/family/caregiver, documentation, independently interpreting results and discussing them with patient/family/caregiver, care coordination, ordering medications/ tests/ procedures and referring and communicating with other health care professionals.         Future Appointments   Date Time Provider Department Center   11/10/2022 12:25 PM PFIZER VACCINE, [facility] INTERNAL MEDICINE [facility]   12/12/2022  1:00 PM JESE New [facility] PSYCH [facility]   1/3/2023  3:00 PM Parvez Ortez MD [facility] CARDIO [facility]   1/4/2023  1:00 PM [facility]

## 2022-11-07 NOTE — Clinical Note
Plan to start pt on carbo taxol weekly chemo at [facility]  along with radiation at main campus. Please coordinate with rad onc.  Pt needs guardant 360.  Also pt has transportation issues, sabi please assist.  RTC next week md/labs/tx  Cbc, cmp, mag

## 2022-11-07 NOTE — PROGRESS NOTES
Social Work Consult    Name:Nalini Varma  : 1950  MRN: [id number]    Referral:Transportation    SW received consult from Dr. Gilmore. Patient is having transportation concerns.    SW spoke to patient who explained that she has transportation benefits through [employer] but they are often late or unreliable. Patient also has Medicaid. SW request patient to call [facility] at [phone] to see if she has benefits. Patient is also concerned because on some days she will have appointments at [facility] and [facility].    SW also emailed patient with the [employer] application. SW assured patient that we will make sure she is able to attend all her oncology appointments.

## 2022-11-08 ENCOUNTER — PATIENT MESSAGE (OUTPATIENT)
Dept: HEMATOLOGY/ONCOLOGY | Facility: CLINIC | Age: 72
End: 2022-11-08
Payer: MEDICARE

## 2022-11-08 ENCOUNTER — PATIENT MESSAGE (OUTPATIENT)
Dept: RADIATION ONCOLOGY | Facility: CLINIC | Age: 72
End: 2022-11-08
Payer: MEDICARE

## 2022-11-08 ENCOUNTER — IMMUNIZATION (OUTPATIENT)
Dept: PHARMACY | Facility: CLINIC | Age: 72
End: 2022-11-08
Payer: MEDICARE

## 2022-11-08 ENCOUNTER — DOCUMENTATION ONLY (OUTPATIENT)
Dept: HEMATOLOGY/ONCOLOGY | Facility: CLINIC | Age: 72
End: 2022-11-08
Payer: MEDICARE

## 2022-11-08 LAB
FERRITIN SERPL-MCNC: 279 NG/ML (ref 20–300)
IRON SERPL-MCNC: 49 UG/DL (ref 30–160)
SATURATED IRON: 22 % (ref 20–50)
TOTAL IRON BINDING CAPACITY: 226 UG/DL (ref 250–450)
TRANSFERRIN SERPL-MCNC: 153 MG/DL (ref 200–375)

## 2022-11-08 PROCEDURE — 77301 RADIOTHERAPY DOSE PLAN IMRT: CPT | Mod: 26,,, | Performed by: RADIOLOGY

## 2022-11-08 PROCEDURE — 77301 PR  INTEN MOD RADIOTHER PLAN W/DOSE VOL HIST: ICD-10-PCS | Mod: 26,,, | Performed by: RADIOLOGY

## 2022-11-08 PROCEDURE — 77301 RADIOTHERAPY DOSE PLAN IMRT: CPT | Mod: TC | Performed by: RADIOLOGY

## 2022-11-08 NOTE — PROGRESS NOTES
SW received completed [employer] intake form from patient. There were formatting issues but SW able to recreate first application page using same patient information and emailed to [email or web address].    SW responded via email that intake has been submitted.    SW also inquired into whether patient was able to find out about transportation benefits with Medicaid.

## 2022-11-09 PROCEDURE — 77338 DESIGN MLC DEVICE FOR IMRT: CPT | Mod: TC | Performed by: RADIOLOGY

## 2022-11-09 PROCEDURE — 77338 DESIGN MLC DEVICE FOR IMRT: CPT | Mod: 26,,, | Performed by: RADIOLOGY

## 2022-11-09 PROCEDURE — 77300 RADIATION THERAPY DOSE PLAN: CPT | Mod: 26,,, | Performed by: RADIOLOGY

## 2022-11-09 PROCEDURE — 77300 RADIATION THERAPY DOSE PLAN: CPT | Mod: TC | Performed by: RADIOLOGY

## 2022-11-09 PROCEDURE — 77300 PR RADIATION THERAPY,DOSIMETRY PLAN: ICD-10-PCS | Mod: 26,,, | Performed by: RADIOLOGY

## 2022-11-09 PROCEDURE — 77338 PR  MLC IMRT DESIGN & CONSTRUCTION PER IMRT PLAN: ICD-10-PCS | Mod: 26,,, | Performed by: RADIOLOGY

## 2022-11-10 ENCOUNTER — TELEPHONE (OUTPATIENT)
Dept: INTERNAL MEDICINE | Facility: CLINIC | Age: 72
End: 2022-11-10
Payer: MEDICARE

## 2022-11-10 PROCEDURE — 77470 SPECIAL RADIATION TREATMENT: CPT | Mod: 26,59,, | Performed by: RADIOLOGY

## 2022-11-10 PROCEDURE — 77470 PR  SPECIAL RADIATION TREATMENT: ICD-10-PCS | Mod: 26,59,, | Performed by: RADIOLOGY

## 2022-11-10 PROCEDURE — 77470 SPECIAL RADIATION TREATMENT: CPT | Mod: 59,TC | Performed by: RADIOLOGY

## 2022-11-10 NOTE — TELEPHONE ENCOUNTER
Contacted IR and states will send request for biopsy. Imaging has to be reviewed by radiologist in order to schedule biopsy.    Detail Level: Detailed Quality 431: Preventive Care And Screening: Unhealthy Alcohol Use - Screening: Patient not identified as an unhealthy alcohol user when screened for unhealthy alcohol use using a systematic screening method Quality 226: Preventive Care And Screening: Tobacco Use: Screening And Cessation Intervention: Patient screened for tobacco use and is an ex/non-smoker

## 2022-11-11 ENCOUNTER — DOCUMENTATION ONLY (OUTPATIENT)
Dept: HEMATOLOGY/ONCOLOGY | Facility: CLINIC | Age: 72
End: 2022-11-11
Payer: MEDICARE

## 2022-11-11 NOTE — PROGRESS NOTES
MAURY called and spoke to patient. She explained that she did not yet know her radiation schedule yet but also wanted to find out. She said she would call her nurse to inquire into timing.    Patient will call back once schedule is known and notify MAURY. MAURY will arrange a cab schedule.

## 2022-11-15 DIAGNOSIS — C34.92 RECURRENT ADENOCARCINOMA OF LEFT LUNG: Primary | ICD-10-CM

## 2022-11-15 RX ORDER — PROMETHAZINE HYDROCHLORIDE 25 MG/1
25 TABLET ORAL EVERY 6 HOURS PRN
Qty: 60 TABLET | Refills: 3 | Status: ON HOLD | OUTPATIENT
Start: 2022-11-15 | End: 2023-06-15

## 2022-11-15 RX ORDER — ONDANSETRON 8 MG/1
8 TABLET, ORALLY DISINTEGRATING ORAL EVERY 8 HOURS PRN
Qty: 60 TABLET | Refills: 2 | Status: ON HOLD | OUTPATIENT
Start: 2022-11-15 | End: 2023-06-15

## 2022-11-16 ENCOUNTER — TELEPHONE (OUTPATIENT)
Dept: RADIATION ONCOLOGY | Facility: CLINIC | Age: 72
End: 2022-11-16
Payer: MEDICARE

## 2022-11-16 ENCOUNTER — PATIENT MESSAGE (OUTPATIENT)
Dept: INTERNAL MEDICINE | Facility: CLINIC | Age: 72
End: 2022-11-16
Payer: MEDICARE

## 2022-11-16 ENCOUNTER — DOCUMENTATION ONLY (OUTPATIENT)
Dept: HEMATOLOGY/ONCOLOGY | Facility: CLINIC | Age: 72
End: 2022-11-16
Payer: MEDICARE

## 2022-11-16 DIAGNOSIS — M17.0 PRIMARY OSTEOARTHRITIS OF BOTH KNEES: ICD-10-CM

## 2022-11-16 DIAGNOSIS — G89.4 CHRONIC PAIN SYNDROME: ICD-10-CM

## 2022-11-16 RX ORDER — HYDROCODONE BITARTRATE AND ACETAMINOPHEN 10; 325 MG/1; MG/1
1 TABLET ORAL EVERY 12 HOURS PRN
Qty: 60 TABLET | Refills: 0 | Status: SHIPPED | OUTPATIENT
Start: 2022-11-16 | End: 2022-12-15 | Stop reason: SDUPTHER

## 2022-11-16 NOTE — PROGRESS NOTES
Patient's radiation appointment time changed to 11/21/22 at 11:30a. SW spoke to patient who confirmed timing and need.    SW arranged transportation with [facility], [phone].

## 2022-11-16 NOTE — TELEPHONE ENCOUNTER
Care Due:                  Date            Visit Type   Department     Provider  --------------------------------------------------------------------------------                                EP -                              PRIMARY      [facility] INTERNAL  Last Visit: 09-      CARE ([facility])   MEDICINE       Yane Sahni                              EP -                              PRIMARY      [facility] INTERNAL  Next Visit: 01-      CARE ([facility])   MEDICINE       Yane Sahni                                                            Last  Test          Frequency    Reason                     Performed    Due Date  --------------------------------------------------------------------------------    Lipid Panel.  12 months..  atorvastatin.............  02-   02-    [employer] Embedded Care Gaps. Reference number: [id number]. 11/16/2022   2:56:52 PM CST

## 2022-11-16 NOTE — TELEPHONE ENCOUNTER
Phone call to patient who cxed her treatment today because the elevator in her apartment was out of service.  Confirmed that radiation will start on 11/21/22 aty 11:30 at the [facility] office  message sent to Cornelio Alvarado to set up cab

## 2022-11-21 ENCOUNTER — LAB VISIT (OUTPATIENT)
Dept: LAB | Facility: OTHER | Age: 72
End: 2022-11-21
Attending: STUDENT IN AN ORGANIZED HEALTH CARE EDUCATION/TRAINING PROGRAM
Payer: MEDICARE

## 2022-11-21 ENCOUNTER — DOCUMENTATION ONLY (OUTPATIENT)
Dept: HEMATOLOGY/ONCOLOGY | Facility: CLINIC | Age: 72
End: 2022-11-21
Payer: MEDICARE

## 2022-11-21 DIAGNOSIS — C34.92 RECURRENT ADENOCARCINOMA OF LEFT LUNG: ICD-10-CM

## 2022-11-21 LAB
ALBUMIN SERPL BCP-MCNC: 3 G/DL (ref 3.5–5.2)
ALP SERPL-CCNC: 105 U/L (ref 55–135)
ALT SERPL W/O P-5'-P-CCNC: 11 U/L (ref 10–44)
ANION GAP SERPL CALC-SCNC: 6 MMOL/L (ref 8–16)
AST SERPL-CCNC: 12 U/L (ref 10–40)
BILIRUB SERPL-MCNC: 0.3 MG/DL (ref 0.1–1)
BUN SERPL-MCNC: 21 MG/DL (ref 8–23)
CALCIUM SERPL-MCNC: 9.1 MG/DL (ref 8.7–10.5)
CHLORIDE SERPL-SCNC: 93 MMOL/L (ref 95–110)
CO2 SERPL-SCNC: 31 MMOL/L (ref 23–29)
CREAT SERPL-MCNC: 1.2 MG/DL (ref 0.5–1.4)
ERYTHROCYTE [DISTWIDTH] IN BLOOD BY AUTOMATED COUNT: 13.9 % (ref 11.5–14.5)
EST. GFR  (NO RACE VARIABLE): 48 ML/MIN/1.73 M^2
GLUCOSE SERPL-MCNC: 124 MG/DL (ref 70–110)
HCT VFR BLD AUTO: 30.6 % (ref 37–48.5)
HGB BLD-MCNC: 9.9 G/DL (ref 12–16)
IMM GRANULOCYTES # BLD AUTO: 0.02 K/UL (ref 0–0.04)
MAGNESIUM SERPL-MCNC: 1.2 MG/DL (ref 1.6–2.6)
MCH RBC QN AUTO: 31.5 PG (ref 27–31)
MCHC RBC AUTO-ENTMCNC: 32.4 G/DL (ref 32–36)
MCV RBC AUTO: 98 FL (ref 82–98)
NEUTROPHILS # BLD AUTO: 5.2 K/UL (ref 1.8–7.7)
PLATELET # BLD AUTO: 214 K/UL (ref 150–450)
PMV BLD AUTO: 9.8 FL (ref 9.2–12.9)
POTASSIUM SERPL-SCNC: 4.9 MMOL/L (ref 3.5–5.1)
PROT SERPL-MCNC: 5.9 G/DL (ref 6–8.4)
RBC # BLD AUTO: 3.14 M/UL (ref 4–5.4)
SODIUM SERPL-SCNC: 130 MMOL/L (ref 136–145)
WBC # BLD AUTO: 7.44 K/UL (ref 3.9–12.7)

## 2022-11-21 PROCEDURE — 36415 COLL VENOUS BLD VENIPUNCTURE: CPT | Performed by: STUDENT IN AN ORGANIZED HEALTH CARE EDUCATION/TRAINING PROGRAM

## 2022-11-21 PROCEDURE — 77386 HC IMRT, COMPLEX: CPT | Performed by: RADIOLOGY

## 2022-11-21 PROCEDURE — 77014 PR  CT GUIDANCE PLACEMENT RAD THERAPY FIELDS: ICD-10-PCS | Mod: 26,,, | Performed by: RADIOLOGY

## 2022-11-21 PROCEDURE — 77014 HC CT GUIDANCE RADIATION THERAPY FLDS PLACEMENT: CPT | Mod: TC | Performed by: RADIOLOGY

## 2022-11-21 PROCEDURE — 80053 COMPREHEN METABOLIC PANEL: CPT | Performed by: STUDENT IN AN ORGANIZED HEALTH CARE EDUCATION/TRAINING PROGRAM

## 2022-11-21 PROCEDURE — 85027 COMPLETE CBC AUTOMATED: CPT | Performed by: STUDENT IN AN ORGANIZED HEALTH CARE EDUCATION/TRAINING PROGRAM

## 2022-11-21 PROCEDURE — 83735 ASSAY OF MAGNESIUM: CPT | Performed by: STUDENT IN AN ORGANIZED HEALTH CARE EDUCATION/TRAINING PROGRAM

## 2022-11-21 PROCEDURE — 77014 PR  CT GUIDANCE PLACEMENT RAD THERAPY FIELDS: CPT | Mod: 26,,, | Performed by: RADIOLOGY

## 2022-11-21 NOTE — PROGRESS NOTES
MAURY notified that patient will be receiving radiation for 30 treatments. Magda Marie, RN explained that appointments will be at 1:30p Mon through Fri but this week infusion center will be closed on Thurs and Fri for the holiday.    MAURY spoke to patient who confirmed schedule and noted that on Wednesday she will need a 8a pickup for Chemo in the morning.    MAURY spoke to Christiano at [facility] and requested 12:45 pickup for Tues and 8a for Wed. MAURY confirmed that rides will be arranged the Friday for the next week.

## 2022-11-22 PROCEDURE — 77386 HC IMRT, COMPLEX: CPT | Performed by: RADIOLOGY

## 2022-11-22 PROCEDURE — 77014 PR  CT GUIDANCE PLACEMENT RAD THERAPY FIELDS: ICD-10-PCS | Mod: 26,,, | Performed by: RADIOLOGY

## 2022-11-22 PROCEDURE — 77014 PR  CT GUIDANCE PLACEMENT RAD THERAPY FIELDS: CPT | Mod: 26,,, | Performed by: RADIOLOGY

## 2022-11-22 PROCEDURE — 77014 HC CT GUIDANCE RADIATION THERAPY FLDS PLACEMENT: CPT | Mod: TC | Performed by: RADIOLOGY

## 2022-11-22 RX ORDER — DIPHENHYDRAMINE HYDROCHLORIDE 50 MG/ML
50 INJECTION INTRAMUSCULAR; INTRAVENOUS ONCE AS NEEDED
Status: CANCELLED | OUTPATIENT
Start: 2022-11-22

## 2022-11-22 RX ORDER — EPINEPHRINE 0.3 MG/.3ML
0.3 INJECTION SUBCUTANEOUS ONCE AS NEEDED
Status: CANCELLED | OUTPATIENT
Start: 2022-11-22

## 2022-11-22 RX ORDER — SODIUM CHLORIDE 0.9 % (FLUSH) 0.9 %
10 SYRINGE (ML) INJECTION
Status: CANCELLED | OUTPATIENT
Start: 2022-11-22

## 2022-11-22 RX ORDER — HEPARIN 100 UNIT/ML
500 SYRINGE INTRAVENOUS
Status: CANCELLED | OUTPATIENT
Start: 2022-11-22

## 2022-11-22 RX ORDER — FAMOTIDINE 10 MG/ML
20 INJECTION INTRAVENOUS
Status: CANCELLED | OUTPATIENT
Start: 2022-11-22 | End: 2022-11-22

## 2022-11-23 ENCOUNTER — INFUSION (OUTPATIENT)
Dept: INFUSION THERAPY | Facility: OTHER | Age: 72
End: 2022-11-23
Attending: INTERNAL MEDICINE
Payer: MEDICARE

## 2022-11-23 VITALS
HEIGHT: 66 IN | TEMPERATURE: 99 F | BODY MASS INDEX: 33.34 KG/M2 | WEIGHT: 207.44 LBS | DIASTOLIC BLOOD PRESSURE: 67 MMHG | OXYGEN SATURATION: 99 % | SYSTOLIC BLOOD PRESSURE: 146 MMHG | HEART RATE: 76 BPM | RESPIRATION RATE: 16 BRPM

## 2022-11-23 DIAGNOSIS — C34.92 RECURRENT ADENOCARCINOMA OF LEFT LUNG: Primary | ICD-10-CM

## 2022-11-23 PROCEDURE — 77014 PR  CT GUIDANCE PLACEMENT RAD THERAPY FIELDS: CPT | Mod: 26,,, | Performed by: RADIOLOGY

## 2022-11-23 PROCEDURE — 96366 THER/PROPH/DIAG IV INF ADDON: CPT

## 2022-11-23 PROCEDURE — 77386 HC IMRT, COMPLEX: CPT | Performed by: RADIOLOGY

## 2022-11-23 PROCEDURE — 96417 CHEMO IV INFUS EACH ADDL SEQ: CPT

## 2022-11-23 PROCEDURE — 77014 HC CT GUIDANCE RADIATION THERAPY FLDS PLACEMENT: CPT | Mod: TC | Performed by: RADIOLOGY

## 2022-11-23 PROCEDURE — 36591 DRAW BLOOD OFF VENOUS DEVICE: CPT

## 2022-11-23 PROCEDURE — 96375 TX/PRO/DX INJ NEW DRUG ADDON: CPT

## 2022-11-23 PROCEDURE — 77014 PR  CT GUIDANCE PLACEMENT RAD THERAPY FIELDS: ICD-10-PCS | Mod: 26,,, | Performed by: RADIOLOGY

## 2022-11-23 PROCEDURE — 96367 TX/PROPH/DG ADDL SEQ IV INF: CPT

## 2022-11-23 PROCEDURE — 25000003 PHARM REV CODE 250: Performed by: STUDENT IN AN ORGANIZED HEALTH CARE EDUCATION/TRAINING PROGRAM

## 2022-11-23 PROCEDURE — 96413 CHEMO IV INFUSION 1 HR: CPT

## 2022-11-23 PROCEDURE — 63600175 PHARM REV CODE 636 W HCPCS: Performed by: STUDENT IN AN ORGANIZED HEALTH CARE EDUCATION/TRAINING PROGRAM

## 2022-11-23 PROCEDURE — 96415 CHEMO IV INFUSION ADDL HR: CPT

## 2022-11-23 RX ORDER — DIPHENHYDRAMINE HYDROCHLORIDE 50 MG/ML
50 INJECTION INTRAMUSCULAR; INTRAVENOUS ONCE AS NEEDED
Status: DISCONTINUED | OUTPATIENT
Start: 2022-11-23 | End: 2022-11-23 | Stop reason: HOSPADM

## 2022-11-23 RX ORDER — MAGNESIUM SULFATE HEPTAHYDRATE 40 MG/ML
2 INJECTION, SOLUTION INTRAVENOUS
Status: COMPLETED | OUTPATIENT
Start: 2022-11-23 | End: 2022-11-23

## 2022-11-23 RX ORDER — SODIUM CHLORIDE 0.9 % (FLUSH) 0.9 %
10 SYRINGE (ML) INJECTION
Status: DISCONTINUED | OUTPATIENT
Start: 2022-11-23 | End: 2022-11-23 | Stop reason: HOSPADM

## 2022-11-23 RX ORDER — FAMOTIDINE 10 MG/ML
20 INJECTION INTRAVENOUS
Status: COMPLETED | OUTPATIENT
Start: 2022-11-23 | End: 2022-11-23

## 2022-11-23 RX ORDER — MAGNESIUM SULFATE HEPTAHYDRATE 40 MG/ML
2 INJECTION, SOLUTION INTRAVENOUS
Status: CANCELLED
Start: 2022-11-23

## 2022-11-23 RX ORDER — HEPARIN 100 UNIT/ML
500 SYRINGE INTRAVENOUS
Status: DISCONTINUED | OUTPATIENT
Start: 2022-11-23 | End: 2022-11-23 | Stop reason: HOSPADM

## 2022-11-23 RX ORDER — EPINEPHRINE 0.3 MG/.3ML
0.3 INJECTION SUBCUTANEOUS ONCE AS NEEDED
Status: DISCONTINUED | OUTPATIENT
Start: 2022-11-23 | End: 2022-11-23 | Stop reason: HOSPADM

## 2022-11-23 RX ADMIN — FAMOTIDINE 20 MG: 10 INJECTION INTRAVENOUS at 11:11

## 2022-11-23 RX ADMIN — DEXAMETHASONE SODIUM PHOSPHATE 0.25 MG: 4 INJECTION, SOLUTION INTRA-ARTICULAR; INTRALESIONAL; INTRAMUSCULAR; INTRAVENOUS; SOFT TISSUE at 11:11

## 2022-11-23 RX ADMIN — DIPHENHYDRAMINE HYDROCHLORIDE 50 MG: 50 INJECTION, SOLUTION INTRAMUSCULAR; INTRAVENOUS at 11:11

## 2022-11-23 RX ADMIN — PACLITAXEL 84 MG: 6 INJECTION, SOLUTION INTRAVENOUS at 12:11

## 2022-11-23 RX ADMIN — SODIUM CHLORIDE: 0.9 INJECTION, SOLUTION INTRAVENOUS at 09:11

## 2022-11-23 RX ADMIN — CARBOPLATIN 130 MG: 10 INJECTION, SOLUTION INTRAVENOUS at 02:11

## 2022-11-23 RX ADMIN — MAGNESIUM SULFATE IN WATER 2 G: 40 INJECTION, SOLUTION INTRAVENOUS at 09:11

## 2022-11-23 NOTE — PLAN OF CARE
2g Mag/ Taxol/Carbo infusions complete. Pt tolerated well. VSS. NAD. IV DC'd. AVS provided. Pt verbalized understanding of discharge instructions before leaving.

## 2022-11-28 ENCOUNTER — DOCUMENTATION ONLY (OUTPATIENT)
Dept: HEMATOLOGY/ONCOLOGY | Facility: CLINIC | Age: 72
End: 2022-11-28
Payer: MEDICARE

## 2022-11-28 PROCEDURE — 77014 PR  CT GUIDANCE PLACEMENT RAD THERAPY FIELDS: ICD-10-PCS | Mod: 26,,, | Performed by: RADIOLOGY

## 2022-11-28 PROCEDURE — 77386 HC IMRT, COMPLEX: CPT | Performed by: RADIOLOGY

## 2022-11-28 PROCEDURE — 77014 HC CT GUIDANCE RADIATION THERAPY FLDS PLACEMENT: CPT | Mod: TC | Performed by: RADIOLOGY

## 2022-11-28 PROCEDURE — 77014 PR  CT GUIDANCE PLACEMENT RAD THERAPY FIELDS: CPT | Mod: 26,,, | Performed by: RADIOLOGY

## 2022-11-28 NOTE — PROGRESS NOTES
Sw spoke to patient regarding schedule. SW arranged transportation for 12:45p pickup Mon-Thurs to [facility] for radiation.

## 2022-11-29 ENCOUNTER — PATIENT MESSAGE (OUTPATIENT)
Dept: INTERNAL MEDICINE | Facility: CLINIC | Age: 72
End: 2022-11-29
Payer: MEDICARE

## 2022-11-29 DIAGNOSIS — J42 CHRONIC BRONCHITIS, UNSPECIFIED CHRONIC BRONCHITIS TYPE: ICD-10-CM

## 2022-11-29 PROCEDURE — 77014 PR  CT GUIDANCE PLACEMENT RAD THERAPY FIELDS: ICD-10-PCS | Mod: 26,,, | Performed by: RADIOLOGY

## 2022-11-29 PROCEDURE — 77014 HC CT GUIDANCE RADIATION THERAPY FLDS PLACEMENT: CPT | Mod: TC | Performed by: RADIOLOGY

## 2022-11-29 PROCEDURE — 77014 PR  CT GUIDANCE PLACEMENT RAD THERAPY FIELDS: CPT | Mod: 26,,, | Performed by: RADIOLOGY

## 2022-11-29 PROCEDURE — 77386 HC IMRT, COMPLEX: CPT | Performed by: RADIOLOGY

## 2022-11-29 RX ORDER — FLUTICASONE PROPIONATE AND SALMETEROL XINAFOATE 115; 21 UG/1; UG/1
2 AEROSOL, METERED RESPIRATORY (INHALATION) EVERY 12 HOURS
Qty: 36 G | Refills: 3 | Status: ON HOLD | OUTPATIENT
Start: 2022-11-29 | End: 2023-06-15

## 2022-11-29 RX ORDER — FLUTICASONE PROPIONATE AND SALMETEROL XINAFOATE 115; 21 UG/1; UG/1
2 AEROSOL, METERED RESPIRATORY (INHALATION) EVERY 12 HOURS
Qty: 36 G | Refills: 3 | Status: CANCELLED | OUTPATIENT
Start: 2022-11-29 | End: 2023-11-29

## 2022-11-29 RX ORDER — FLUTICASONE PROPIONATE AND SALMETEROL XINAFOATE 115; 21 UG/1; UG/1
2 AEROSOL, METERED RESPIRATORY (INHALATION) EVERY 12 HOURS
Qty: 36 G | Refills: 3 | OUTPATIENT
Start: 2022-11-29 | End: 2023-11-29

## 2022-11-29 NOTE — TELEPHONE ENCOUNTER
No new care gaps identified.  [facility] Embedded Care Gaps. Reference number: [id number]. 11/29/2022   11:14:34 AM CST

## 2022-11-29 NOTE — TELEPHONE ENCOUNTER
No new care gaps identified.  [facility] Embedded Care Gaps. Reference number: [id number]. 11/29/2022   2:33:08 PM CST

## 2022-11-29 NOTE — TELEPHONE ENCOUNTER
No new care gaps identified.  [facility] Embedded Care Gaps. Reference number: [id number]. 11/29/2022   2:23:34 PM CST

## 2022-11-30 ENCOUNTER — DOCUMENTATION ONLY (OUTPATIENT)
Dept: RADIATION ONCOLOGY | Facility: CLINIC | Age: 72
End: 2022-11-30
Payer: MEDICARE

## 2022-11-30 ENCOUNTER — DOCUMENTATION ONLY (OUTPATIENT)
Dept: HEMATOLOGY/ONCOLOGY | Facility: CLINIC | Age: 72
End: 2022-11-30
Payer: MEDICARE

## 2022-11-30 PROCEDURE — 77386 HC IMRT, COMPLEX: CPT | Performed by: RADIOLOGY

## 2022-11-30 PROCEDURE — 77014 HC CT GUIDANCE RADIATION THERAPY FLDS PLACEMENT: CPT | Mod: TC | Performed by: RADIOLOGY

## 2022-11-30 PROCEDURE — 77014 PR  CT GUIDANCE PLACEMENT RAD THERAPY FIELDS: CPT | Mod: 26,,, | Performed by: RADIOLOGY

## 2022-11-30 PROCEDURE — 77014 PR  CT GUIDANCE PLACEMENT RAD THERAPY FIELDS: ICD-10-PCS | Mod: 26,,, | Performed by: RADIOLOGY

## 2022-11-30 PROCEDURE — 77336 RADIATION PHYSICS CONSULT: CPT | Performed by: RADIOLOGY

## 2022-11-30 NOTE — PLAN OF CARE
Day 6 of outpatient radiation to left lung. Overall doing well. No dysphagia or esophagitis. No hemoptysis. Tolerating therapy well.

## 2022-11-30 NOTE — PROGRESS NOTES
SW received VM from patient, she will need a ride at 9a on Friday to [facility] for radiation amongst other visits. She request a call back only if there were questions or concerns.    MAURY arranged transportation through [email or web address].

## 2022-12-01 ENCOUNTER — APPOINTMENT (OUTPATIENT)
Dept: RADIATION THERAPY | Facility: OTHER | Age: 72
End: 2022-12-01
Attending: RADIOLOGY
Payer: MEDICARE

## 2022-12-01 PROCEDURE — 77014 PR  CT GUIDANCE PLACEMENT RAD THERAPY FIELDS: ICD-10-PCS | Mod: 26,,, | Performed by: RADIOLOGY

## 2022-12-01 PROCEDURE — 77014 PR  CT GUIDANCE PLACEMENT RAD THERAPY FIELDS: CPT | Mod: 26,,, | Performed by: RADIOLOGY

## 2022-12-01 PROCEDURE — 77386 HC IMRT, COMPLEX: CPT | Performed by: RADIOLOGY

## 2022-12-01 PROCEDURE — 77014 HC CT GUIDANCE RADIATION THERAPY FLDS PLACEMENT: CPT | Mod: TC | Performed by: RADIOLOGY

## 2022-12-02 ENCOUNTER — DOCUMENTATION ONLY (OUTPATIENT)
Dept: HEMATOLOGY/ONCOLOGY | Facility: CLINIC | Age: 72
End: 2022-12-02
Payer: MEDICARE

## 2022-12-02 ENCOUNTER — OFFICE VISIT (OUTPATIENT)
Dept: HEMATOLOGY/ONCOLOGY | Facility: CLINIC | Age: 72
End: 2022-12-02
Payer: MEDICARE

## 2022-12-02 ENCOUNTER — INFUSION (OUTPATIENT)
Dept: INFUSION THERAPY | Facility: OTHER | Age: 72
End: 2022-12-02
Attending: INTERNAL MEDICINE
Payer: MEDICARE

## 2022-12-02 VITALS
HEART RATE: 71 BPM | RESPIRATION RATE: 16 BRPM | SYSTOLIC BLOOD PRESSURE: 159 MMHG | OXYGEN SATURATION: 99 % | DIASTOLIC BLOOD PRESSURE: 72 MMHG

## 2022-12-02 VITALS
DIASTOLIC BLOOD PRESSURE: 65 MMHG | OXYGEN SATURATION: 99 % | HEART RATE: 73 BPM | BODY MASS INDEX: 34.82 KG/M2 | WEIGHT: 216.69 LBS | TEMPERATURE: 98 F | RESPIRATION RATE: 20 BRPM | SYSTOLIC BLOOD PRESSURE: 146 MMHG | HEIGHT: 66 IN

## 2022-12-02 DIAGNOSIS — C34.92 RECURRENT ADENOCARCINOMA OF LEFT LUNG: Primary | ICD-10-CM

## 2022-12-02 DIAGNOSIS — C77.1 SECONDARY MALIGNANT NEOPLASM OF INTRATHORACIC LYMPH NODES: ICD-10-CM

## 2022-12-02 DIAGNOSIS — D50.0 IRON DEFICIENCY ANEMIA DUE TO CHRONIC BLOOD LOSS: ICD-10-CM

## 2022-12-02 DIAGNOSIS — J96.11 CHRONIC RESPIRATORY FAILURE WITH HYPOXIA: ICD-10-CM

## 2022-12-02 PROCEDURE — 96367 TX/PROPH/DG ADDL SEQ IV INF: CPT

## 2022-12-02 PROCEDURE — 1101F PR PT FALLS ASSESS DOC 0-1 FALLS W/OUT INJ PAST YR: ICD-10-PCS | Mod: CPTII,S$GLB,, | Performed by: STUDENT IN AN ORGANIZED HEALTH CARE EDUCATION/TRAINING PROGRAM

## 2022-12-02 PROCEDURE — 99215 PR OFFICE/OUTPT VISIT, EST, LEVL V, 40-54 MIN: ICD-10-PCS | Mod: S$GLB,,, | Performed by: STUDENT IN AN ORGANIZED HEALTH CARE EDUCATION/TRAINING PROGRAM

## 2022-12-02 PROCEDURE — 99999 PR PBB SHADOW E&M-EST. PATIENT-LVL III: ICD-10-PCS | Mod: PBBFAC,,, | Performed by: STUDENT IN AN ORGANIZED HEALTH CARE EDUCATION/TRAINING PROGRAM

## 2022-12-02 PROCEDURE — 3288F FALL RISK ASSESSMENT DOCD: CPT | Mod: CPTII,S$GLB,, | Performed by: STUDENT IN AN ORGANIZED HEALTH CARE EDUCATION/TRAINING PROGRAM

## 2022-12-02 PROCEDURE — 77014 PR  CT GUIDANCE PLACEMENT RAD THERAPY FIELDS: ICD-10-PCS | Mod: 26,,, | Performed by: RADIOLOGY

## 2022-12-02 PROCEDURE — 4010F ACE/ARB THERAPY RXD/TAKEN: CPT | Mod: CPTII,S$GLB,, | Performed by: STUDENT IN AN ORGANIZED HEALTH CARE EDUCATION/TRAINING PROGRAM

## 2022-12-02 PROCEDURE — 1125F AMNT PAIN NOTED PAIN PRSNT: CPT | Mod: CPTII,S$GLB,, | Performed by: STUDENT IN AN ORGANIZED HEALTH CARE EDUCATION/TRAINING PROGRAM

## 2022-12-02 PROCEDURE — 3288F PR FALLS RISK ASSESSMENT DOCUMENTED: ICD-10-PCS | Mod: CPTII,S$GLB,, | Performed by: STUDENT IN AN ORGANIZED HEALTH CARE EDUCATION/TRAINING PROGRAM

## 2022-12-02 PROCEDURE — 96375 TX/PRO/DX INJ NEW DRUG ADDON: CPT

## 2022-12-02 PROCEDURE — 3077F PR MOST RECENT SYSTOLIC BLOOD PRESSURE >= 140 MM HG: ICD-10-PCS | Mod: CPTII,S$GLB,, | Performed by: STUDENT IN AN ORGANIZED HEALTH CARE EDUCATION/TRAINING PROGRAM

## 2022-12-02 PROCEDURE — 1159F MED LIST DOCD IN RCRD: CPT | Mod: CPTII,S$GLB,, | Performed by: STUDENT IN AN ORGANIZED HEALTH CARE EDUCATION/TRAINING PROGRAM

## 2022-12-02 PROCEDURE — 3078F PR MOST RECENT DIASTOLIC BLOOD PRESSURE < 80 MM HG: ICD-10-PCS | Mod: CPTII,S$GLB,, | Performed by: STUDENT IN AN ORGANIZED HEALTH CARE EDUCATION/TRAINING PROGRAM

## 2022-12-02 PROCEDURE — 3008F BODY MASS INDEX DOCD: CPT | Mod: CPTII,S$GLB,, | Performed by: STUDENT IN AN ORGANIZED HEALTH CARE EDUCATION/TRAINING PROGRAM

## 2022-12-02 PROCEDURE — 99999 PR PBB SHADOW E&M-EST. PATIENT-LVL III: CPT | Mod: PBBFAC,,, | Performed by: STUDENT IN AN ORGANIZED HEALTH CARE EDUCATION/TRAINING PROGRAM

## 2022-12-02 PROCEDURE — 96413 CHEMO IV INFUSION 1 HR: CPT

## 2022-12-02 PROCEDURE — 99215 OFFICE O/P EST HI 40 MIN: CPT | Mod: S$GLB,,, | Performed by: STUDENT IN AN ORGANIZED HEALTH CARE EDUCATION/TRAINING PROGRAM

## 2022-12-02 PROCEDURE — 77386 HC IMRT, COMPLEX: CPT | Performed by: RADIOLOGY

## 2022-12-02 PROCEDURE — 3008F PR BODY MASS INDEX (BMI) DOCUMENTED: ICD-10-PCS | Mod: CPTII,S$GLB,, | Performed by: STUDENT IN AN ORGANIZED HEALTH CARE EDUCATION/TRAINING PROGRAM

## 2022-12-02 PROCEDURE — 1160F RVW MEDS BY RX/DR IN RCRD: CPT | Mod: CPTII,S$GLB,, | Performed by: STUDENT IN AN ORGANIZED HEALTH CARE EDUCATION/TRAINING PROGRAM

## 2022-12-02 PROCEDURE — 63600175 PHARM REV CODE 636 W HCPCS: Performed by: STUDENT IN AN ORGANIZED HEALTH CARE EDUCATION/TRAINING PROGRAM

## 2022-12-02 PROCEDURE — 4010F PR ACE/ARB THEARPY RXD/TAKEN: ICD-10-PCS | Mod: CPTII,S$GLB,, | Performed by: STUDENT IN AN ORGANIZED HEALTH CARE EDUCATION/TRAINING PROGRAM

## 2022-12-02 PROCEDURE — 77014 HC CT GUIDANCE RADIATION THERAPY FLDS PLACEMENT: CPT | Mod: TC | Performed by: RADIOLOGY

## 2022-12-02 PROCEDURE — 3078F DIAST BP <80 MM HG: CPT | Mod: CPTII,S$GLB,, | Performed by: STUDENT IN AN ORGANIZED HEALTH CARE EDUCATION/TRAINING PROGRAM

## 2022-12-02 PROCEDURE — 1160F PR REVIEW ALL MEDS BY PRESCRIBER/CLIN PHARMACIST DOCUMENTED: ICD-10-PCS | Mod: CPTII,S$GLB,, | Performed by: STUDENT IN AN ORGANIZED HEALTH CARE EDUCATION/TRAINING PROGRAM

## 2022-12-02 PROCEDURE — 3044F PR MOST RECENT HEMOGLOBIN A1C LEVEL <7.0%: ICD-10-PCS | Mod: CPTII,S$GLB,, | Performed by: STUDENT IN AN ORGANIZED HEALTH CARE EDUCATION/TRAINING PROGRAM

## 2022-12-02 PROCEDURE — 3044F HG A1C LEVEL LT 7.0%: CPT | Mod: CPTII,S$GLB,, | Performed by: STUDENT IN AN ORGANIZED HEALTH CARE EDUCATION/TRAINING PROGRAM

## 2022-12-02 PROCEDURE — 25000003 PHARM REV CODE 250: Performed by: STUDENT IN AN ORGANIZED HEALTH CARE EDUCATION/TRAINING PROGRAM

## 2022-12-02 PROCEDURE — 1125F PR PAIN SEVERITY QUANTIFIED, PAIN PRESENT: ICD-10-PCS | Mod: CPTII,S$GLB,, | Performed by: STUDENT IN AN ORGANIZED HEALTH CARE EDUCATION/TRAINING PROGRAM

## 2022-12-02 PROCEDURE — 1101F PT FALLS ASSESS-DOCD LE1/YR: CPT | Mod: CPTII,S$GLB,, | Performed by: STUDENT IN AN ORGANIZED HEALTH CARE EDUCATION/TRAINING PROGRAM

## 2022-12-02 PROCEDURE — 77014 PR  CT GUIDANCE PLACEMENT RAD THERAPY FIELDS: CPT | Mod: 26,,, | Performed by: RADIOLOGY

## 2022-12-02 PROCEDURE — 96417 CHEMO IV INFUS EACH ADDL SEQ: CPT

## 2022-12-02 PROCEDURE — 3077F SYST BP >= 140 MM HG: CPT | Mod: CPTII,S$GLB,, | Performed by: STUDENT IN AN ORGANIZED HEALTH CARE EDUCATION/TRAINING PROGRAM

## 2022-12-02 PROCEDURE — 1159F PR MEDICATION LIST DOCUMENTED IN MEDICAL RECORD: ICD-10-PCS | Mod: CPTII,S$GLB,, | Performed by: STUDENT IN AN ORGANIZED HEALTH CARE EDUCATION/TRAINING PROGRAM

## 2022-12-02 RX ORDER — HEPARIN 100 UNIT/ML
500 SYRINGE INTRAVENOUS
Status: CANCELLED | OUTPATIENT
Start: 2022-12-02

## 2022-12-02 RX ORDER — SODIUM CHLORIDE 0.9 % (FLUSH) 0.9 %
10 SYRINGE (ML) INJECTION
Status: CANCELLED | OUTPATIENT
Start: 2022-12-02

## 2022-12-02 RX ORDER — EPINEPHRINE 0.3 MG/.3ML
0.3 INJECTION SUBCUTANEOUS ONCE AS NEEDED
Status: DISCONTINUED | OUTPATIENT
Start: 2022-12-02 | End: 2022-12-02 | Stop reason: HOSPADM

## 2022-12-02 RX ORDER — DIPHENHYDRAMINE HYDROCHLORIDE 50 MG/ML
50 INJECTION INTRAMUSCULAR; INTRAVENOUS ONCE AS NEEDED
Status: CANCELLED | OUTPATIENT
Start: 2022-12-02

## 2022-12-02 RX ORDER — FAMOTIDINE 10 MG/ML
20 INJECTION INTRAVENOUS
Status: COMPLETED | OUTPATIENT
Start: 2022-12-02 | End: 2022-12-02

## 2022-12-02 RX ORDER — HEPARIN 100 UNIT/ML
500 SYRINGE INTRAVENOUS
Status: DISCONTINUED | OUTPATIENT
Start: 2022-12-02 | End: 2022-12-02 | Stop reason: HOSPADM

## 2022-12-02 RX ORDER — FAMOTIDINE 10 MG/ML
20 INJECTION INTRAVENOUS
Status: CANCELLED | OUTPATIENT
Start: 2022-12-02 | End: 2022-12-02

## 2022-12-02 RX ORDER — SODIUM CHLORIDE 0.9 % (FLUSH) 0.9 %
10 SYRINGE (ML) INJECTION
Status: DISCONTINUED | OUTPATIENT
Start: 2022-12-02 | End: 2022-12-02 | Stop reason: HOSPADM

## 2022-12-02 RX ORDER — EPINEPHRINE 0.3 MG/.3ML
0.3 INJECTION SUBCUTANEOUS ONCE AS NEEDED
Status: CANCELLED | OUTPATIENT
Start: 2022-12-02

## 2022-12-02 RX ORDER — DIPHENHYDRAMINE HYDROCHLORIDE 50 MG/ML
50 INJECTION INTRAMUSCULAR; INTRAVENOUS ONCE AS NEEDED
Status: DISCONTINUED | OUTPATIENT
Start: 2022-12-02 | End: 2022-12-02 | Stop reason: HOSPADM

## 2022-12-02 RX ADMIN — DEXAMETHASONE SODIUM PHOSPHATE 0.25 MG: 4 INJECTION, SOLUTION INTRA-ARTICULAR; INTRALESIONAL; INTRAMUSCULAR; INTRAVENOUS; SOFT TISSUE at 12:12

## 2022-12-02 RX ADMIN — DIPHENHYDRAMINE HYDROCHLORIDE 50 MG: 50 INJECTION, SOLUTION INTRAMUSCULAR; INTRAVENOUS at 12:12

## 2022-12-02 RX ADMIN — FAMOTIDINE 20 MG: 10 INJECTION INTRAVENOUS at 12:12

## 2022-12-02 RX ADMIN — PACLITAXEL 84 MG: 6 INJECTION, SOLUTION INTRAVENOUS at 02:12

## 2022-12-02 RX ADMIN — SODIUM CHLORIDE: 0.9 INJECTION, SOLUTION INTRAVENOUS at 12:12

## 2022-12-02 RX ADMIN — CARBOPLATIN 135 MG: 10 INJECTION, SOLUTION INTRAVENOUS at 03:12

## 2022-12-02 NOTE — PLAN OF CARE
Paclitaxel chemotherapy infusion and Carboplatin chemotherapy infusion administered, no reaction. Patient tolerated well. 24 gauge IV removed, catheter intact. No apparent distress noted. Discharge instructions given to patient. Patient understands instructions. Follow up appointment scheduled.

## 2022-12-02 NOTE — Clinical Note
Return to clinic 1 week MD labs treatment Please plan for 4 g of magnesium, okay to do in divided doses

## 2022-12-02 NOTE — PROGRESS NOTES
SW spoke to patient to discuss next weeks schedule. Patient will require 12:45p pickup Mon-Thurs and 9a pickup on Friday. MAURY spoke to Christiano at [facility] and arranged transportation for Mon-Fri.

## 2022-12-02 NOTE — PROGRESS NOTES
Hematology - oncology PROGRESS NOTE     Subjective:       Patient ID: Nalini Varma is a 72 y.o. female.     Chief Complaint: follow up for chronic anemia and lung cancer     Diagnosis:  1. Anemia of chronic disease  2. Stage I Lung adenocarcinoma of right upper lobe, lepidic pattern, s/p SBRT in Oct/Nov 2021  3. Recurrent Lung cancer 2022     Oncologic History:  1. Ms Varma is a 68 yo woman with chronic pain, anxiety, COPD on 2 liters of home oxygen, CHF with preserved ejection fraction, HTN, CKD stage 3 (creatinine 1.6), who presents for further evaluation of newly diagnosed lung adenocarcinoma. She underwent a screening CT chest on 7/23/20, which showed a 0.6 cm right upper lobe noncalcified pulmonary nodule which measured 0.4 cm on previous CT dated 03/20/2019.  There is a pleural based 1.2 cm pulmonary nodule within the right upper lobe which measured 0.9 cm on previous CT.  There is a 1.2 cm partially spiculated right upper lobe noncalcified pulmonary nodule which measured 0.6 cm on previous CT. She underwent a right lung biopsy on 9/4/2020. Pathology showed adenocarcinoma, well differentiated, lepidic pattern, cannot rule out an invasive component. She presents today for further evaluation. Has been smoking 2PPD for 50 years. Enrolled in smoking cessation program. Now down to 1PPD. Has been on 2L O2 for many years. Needs to use 3 liters when she is wearing a mask. Has CHF. On torsemide. Uses two pillows at night. Unable to walk for a block due to dyspnea. No weight loss. Lives by herself  2. CT head negative for metastases. PET scan 10/7/20: Two right upper lobe nodule with no appreciable activity.  Please note that FDG PET has poor sensitivity for small, well differentiated, and/or indolent malignancies.  An additional subcentimeter node in the apex is too small to characterize by PET for which attention on follow-up is recommended. Mildly enlarged mediastinal lymph nodes, as above, with no  hypermetabolic activity.  Metastatic involvement is not excluded.  3. Seen by Dr Mead. Underwent SBRT 10/29/21-21       Interval History:   Patient tolerated chemotherapy well except for some fatigue.  Mild nausea which is controlled with Zofran.    ECO     ROS:   A ten-point system review is obtained and negative except for what was stated in the Interval History.      Physical Examination:   There were no vitals taken for this visit.    Physical Exam  Vitals and nursing note reviewed.   Constitutional:       General: She is not in acute distress.  HENT:      Head: Normocephalic and atraumatic.      Mouth/Throat:      Pharynx: No oropharyngeal exudate.   Eyes:      General: No scleral icterus.        Right eye: No discharge.         Left eye: No discharge.   Neck:      Thyroid: No thyromegaly.      Trachea: No tracheal deviation.   Cardiovascular:      Rate and Rhythm: Normal rate and regular rhythm.      Heart sounds: Normal heart sounds. No murmur heard.  Pulmonary:      Effort: Pulmonary effort is normal. No respiratory distress.      Breath sounds: Normal breath sounds. Decreased air movement present. No wheezing or rales.   Abdominal:      General: Bowel sounds are normal. There is no distension.      Palpations: Abdomen is soft.      Tenderness: There is no abdominal tenderness.   Musculoskeletal:         General: No tenderness. Normal range of motion.      Cervical back: Normal range of motion and neck supple.   Lymphadenopathy:      Cervical: No cervical adenopathy.   Skin:     General: Skin is warm and dry.      Findings: No rash.   Neurological:      Mental Status: She is alert and oriented to person, place, and time.      Cranial Nerves: No cranial nerve deficit.      Gait: Gait is intact.   Psychiatric:         Mood and Affect: Mood and affect normal.       Objective:      Laboratory Data:  Labs reviewed.      Imaging Data:  PET scan 10/7/2020:  Impression:     1. Two right upper lobe nodule  with no appreciable activity.  Please note that FDG PET has poor sensitivity for small, well differentiated, and/or indolent malignancies.  An additional subcentimeter node in the apex is too small to characterize by PET for which attention on follow-up is recommended.  2. Mildly enlarged mediastinal lymph nodes, as above, with no hypermetabolic activity.  Metastatic involvement is not excluded.  3. Additional CT findings, as above.     CT head 9/25/20:  Impression:     No evidence of acute intracranial pathology.     Moderate patchy mucoperiosteal thickening in the paranasal sinuses.    CT chest 2/11/2021:  Development of central cavitation in previously described 12 mm right upper lobe pulmonary nodule which may be inflammatory in nature including tuberculous and non tuberculous mycobacterial infection, fungal infection etc.  Neoplasm is also included in the differential.     Improved subpleural right upper lobe nodular opacity and stable ground-glass nodule right apex.     Stable calcified granuloma and postinflammatory changes in the lingula probably related to previous non tuberculous mycobacterial infection.     Severe atherosclerosis and severe hepatic steatosis            Assessment and Plan:      1. Recurrent adenocarcinoma of left lung    2. Secondary malignant neoplasm of intrathoracic lymph nodes    3. Chronic respiratory failure with hypoxia    4. Iron deficiency anemia due to chronic blood loss          Recurrent lung cancer   Pt had adenocarcinoma which was treated with SBRT in Oct/Nov 2020 with Dr Mead. Recent imaging with CT and PET concerning for recurrence and jamal involvement. She is poor surgical candidate. We discussed weekly chemo along with radiation. If pt is not able to tolerate the chemo, will dose reduce or discontinue chemo. Pt understands the risks and benefits and prefers to try chemo with radiation with the understanding that if treatment becomes too uncomfortable we will hold chemo  and pursue with radiation alone.   Had repeat biopsy procedure, couldn't get tissue, had PTX, req chest tube  Repeat imaging concerning for recurrent and progression of ds. Case d/w Dr Mead over phone on 11/7/22, concurrent chemo radiation to be tried. Plan for weekly carbo taxol . If pt is unable to tolerate chemo or has transportation issues, Dr Mead plans to change back to hypo fractionated course of radiation. Due to performance status and cor morbidities pt is not a candidate for Q3 week chemotherapy.   Labs reviewed, adequate, proceed with weekly carbotaxol #2.  Replace magnesium    LESLY- on venofer, monitor.    Pancreatic lesion- being followed by Dr Cheema.             Future Appointments   Date Time Provider Department Center   12/2/2022 10:00 AM LAB, SAME DAY [facility] LABDRAW [facility]   12/2/2022 11:00 AM Ann Gilmore MD [facility] HEM ONC [facility]   12/2/2022 11:30 AM CHEMO 04 [facility] CHEMO [facility]   12/12/2022 11:00 AM JESE [facility] PSYCH [address]   1/3/2023  3:00 PM Parvez Ortez MD [facility] CARDIO [address]   1/25/2023  1:30 PM Yane Sahni MD [facility] IM [facility]

## 2022-12-04 ENCOUNTER — PATIENT MESSAGE (OUTPATIENT)
Dept: INTERNAL MEDICINE | Facility: CLINIC | Age: 72
End: 2022-12-04
Payer: MEDICARE

## 2022-12-04 DIAGNOSIS — J42 CHRONIC BRONCHITIS, UNSPECIFIED CHRONIC BRONCHITIS TYPE: ICD-10-CM

## 2022-12-04 RX ORDER — UMECLIDINIUM 62.5 UG/1
62.5 AEROSOL, POWDER ORAL DAILY
Qty: 90 EACH | Refills: 3 | Status: ON HOLD | OUTPATIENT
Start: 2022-12-04 | End: 2023-06-15

## 2022-12-05 PROCEDURE — 77014 PR  CT GUIDANCE PLACEMENT RAD THERAPY FIELDS: ICD-10-PCS | Mod: 26,,, | Performed by: RADIOLOGY

## 2022-12-05 PROCEDURE — 77386 HC IMRT, COMPLEX: CPT | Performed by: RADIOLOGY

## 2022-12-05 PROCEDURE — 77014 PR  CT GUIDANCE PLACEMENT RAD THERAPY FIELDS: CPT | Mod: 26,,, | Performed by: RADIOLOGY

## 2022-12-05 PROCEDURE — 77014 HC CT GUIDANCE RADIATION THERAPY FLDS PLACEMENT: CPT | Mod: TC | Performed by: RADIOLOGY

## 2022-12-05 NOTE — TELEPHONE ENCOUNTER
Refill Decision Note   Nalini Varma  is requesting a refill authorization.  Brief Assessment and Rationale for Refill:  Approve     Medication Therapy Plan:       Medication Reconciliation Completed: No   Comments:     No Care Gaps recommended.     Note composed:10:54 PM 12/04/2022

## 2022-12-05 NOTE — TELEPHONE ENCOUNTER
No new care gaps identified.  [facility] Embedded Care Gaps. Reference number: [id number]. 12/04/2022   10:04:36 PM CST

## 2022-12-06 PROCEDURE — 77014 PR  CT GUIDANCE PLACEMENT RAD THERAPY FIELDS: ICD-10-PCS | Mod: 26,,, | Performed by: RADIOLOGY

## 2022-12-06 PROCEDURE — 77386 HC IMRT, COMPLEX: CPT | Performed by: RADIOLOGY

## 2022-12-06 PROCEDURE — 77014 HC CT GUIDANCE RADIATION THERAPY FLDS PLACEMENT: CPT | Mod: TC | Performed by: RADIOLOGY

## 2022-12-06 PROCEDURE — 77014 PR  CT GUIDANCE PLACEMENT RAD THERAPY FIELDS: CPT | Mod: 26,,, | Performed by: RADIOLOGY

## 2022-12-07 ENCOUNTER — DOCUMENTATION ONLY (OUTPATIENT)
Dept: RADIATION ONCOLOGY | Facility: CLINIC | Age: 72
End: 2022-12-07
Payer: MEDICARE

## 2022-12-07 PROCEDURE — 77014 PR  CT GUIDANCE PLACEMENT RAD THERAPY FIELDS: CPT | Mod: 26,,, | Performed by: RADIOLOGY

## 2022-12-07 PROCEDURE — 77014 PR  CT GUIDANCE PLACEMENT RAD THERAPY FIELDS: ICD-10-PCS | Mod: 26,,, | Performed by: RADIOLOGY

## 2022-12-07 PROCEDURE — 77336 RADIATION PHYSICS CONSULT: CPT | Performed by: RADIOLOGY

## 2022-12-07 PROCEDURE — 77386 HC IMRT, COMPLEX: CPT | Performed by: RADIOLOGY

## 2022-12-07 PROCEDURE — 77014 HC CT GUIDANCE RADIATION THERAPY FLDS PLACEMENT: CPT | Mod: TC | Performed by: RADIOLOGY

## 2022-12-08 ENCOUNTER — DOCUMENTATION ONLY (OUTPATIENT)
Dept: HEMATOLOGY/ONCOLOGY | Facility: CLINIC | Age: 72
End: 2022-12-08
Payer: MEDICARE

## 2022-12-08 PROCEDURE — 77014 HC CT GUIDANCE RADIATION THERAPY FLDS PLACEMENT: CPT | Mod: TC | Performed by: RADIOLOGY

## 2022-12-08 PROCEDURE — 77014 PR  CT GUIDANCE PLACEMENT RAD THERAPY FIELDS: ICD-10-PCS | Mod: 26,,, | Performed by: RADIOLOGY

## 2022-12-08 PROCEDURE — 77386 HC IMRT, COMPLEX: CPT | Performed by: RADIOLOGY

## 2022-12-08 PROCEDURE — 77014 PR  CT GUIDANCE PLACEMENT RAD THERAPY FIELDS: CPT | Mod: 26,,, | Performed by: RADIOLOGY

## 2022-12-08 NOTE — PROGRESS NOTES
Patient left VM with MAURY requesting Friday, 12/9/22 pickup time change from 9a to 7:30a. MAURY left VM in return confirming request was received. MAURY spoke to Christiano at [facility] [phone] and changed Friday's pickup time.

## 2022-12-09 ENCOUNTER — INFUSION (OUTPATIENT)
Dept: INFUSION THERAPY | Facility: OTHER | Age: 72
End: 2022-12-09
Attending: INTERNAL MEDICINE
Payer: MEDICARE

## 2022-12-09 ENCOUNTER — OFFICE VISIT (OUTPATIENT)
Dept: HEMATOLOGY/ONCOLOGY | Facility: CLINIC | Age: 72
End: 2022-12-09
Payer: MEDICARE

## 2022-12-09 ENCOUNTER — DOCUMENTATION ONLY (OUTPATIENT)
Dept: HEMATOLOGY/ONCOLOGY | Facility: CLINIC | Age: 72
End: 2022-12-09
Payer: MEDICARE

## 2022-12-09 VITALS
DIASTOLIC BLOOD PRESSURE: 76 MMHG | SYSTOLIC BLOOD PRESSURE: 159 MMHG | HEART RATE: 72 BPM | OXYGEN SATURATION: 99 % | RESPIRATION RATE: 16 BRPM

## 2022-12-09 VITALS
OXYGEN SATURATION: 100 % | HEIGHT: 66 IN | HEART RATE: 86 BPM | BODY MASS INDEX: 34.82 KG/M2 | SYSTOLIC BLOOD PRESSURE: 114 MMHG | RESPIRATION RATE: 20 BRPM | TEMPERATURE: 98 F | DIASTOLIC BLOOD PRESSURE: 62 MMHG | WEIGHT: 216.69 LBS

## 2022-12-09 DIAGNOSIS — C34.92 RECURRENT ADENOCARCINOMA OF LEFT LUNG: Primary | ICD-10-CM

## 2022-12-09 DIAGNOSIS — C77.1 SECONDARY MALIGNANT NEOPLASM OF INTRATHORACIC LYMPH NODES: ICD-10-CM

## 2022-12-09 DIAGNOSIS — J96.11 CHRONIC RESPIRATORY FAILURE WITH HYPOXIA: ICD-10-CM

## 2022-12-09 DIAGNOSIS — D50.0 IRON DEFICIENCY ANEMIA DUE TO CHRONIC BLOOD LOSS: ICD-10-CM

## 2022-12-09 PROCEDURE — 3008F PR BODY MASS INDEX (BMI) DOCUMENTED: ICD-10-PCS | Mod: CPTII,S$GLB,, | Performed by: STUDENT IN AN ORGANIZED HEALTH CARE EDUCATION/TRAINING PROGRAM

## 2022-12-09 PROCEDURE — 1159F PR MEDICATION LIST DOCUMENTED IN MEDICAL RECORD: ICD-10-PCS | Mod: CPTII,S$GLB,, | Performed by: STUDENT IN AN ORGANIZED HEALTH CARE EDUCATION/TRAINING PROGRAM

## 2022-12-09 PROCEDURE — 96366 THER/PROPH/DIAG IV INF ADDON: CPT

## 2022-12-09 PROCEDURE — 25000003 PHARM REV CODE 250: Performed by: STUDENT IN AN ORGANIZED HEALTH CARE EDUCATION/TRAINING PROGRAM

## 2022-12-09 PROCEDURE — 3078F DIAST BP <80 MM HG: CPT | Mod: CPTII,S$GLB,, | Performed by: STUDENT IN AN ORGANIZED HEALTH CARE EDUCATION/TRAINING PROGRAM

## 2022-12-09 PROCEDURE — 1125F AMNT PAIN NOTED PAIN PRSNT: CPT | Mod: CPTII,S$GLB,, | Performed by: STUDENT IN AN ORGANIZED HEALTH CARE EDUCATION/TRAINING PROGRAM

## 2022-12-09 PROCEDURE — 3074F SYST BP LT 130 MM HG: CPT | Mod: CPTII,S$GLB,, | Performed by: STUDENT IN AN ORGANIZED HEALTH CARE EDUCATION/TRAINING PROGRAM

## 2022-12-09 PROCEDURE — 77014 PR  CT GUIDANCE PLACEMENT RAD THERAPY FIELDS: CPT | Mod: 26,,, | Performed by: RADIOLOGY

## 2022-12-09 PROCEDURE — 96367 TX/PROPH/DG ADDL SEQ IV INF: CPT

## 2022-12-09 PROCEDURE — 1159F MED LIST DOCD IN RCRD: CPT | Mod: CPTII,S$GLB,, | Performed by: STUDENT IN AN ORGANIZED HEALTH CARE EDUCATION/TRAINING PROGRAM

## 2022-12-09 PROCEDURE — 99215 OFFICE O/P EST HI 40 MIN: CPT | Mod: S$GLB,,, | Performed by: STUDENT IN AN ORGANIZED HEALTH CARE EDUCATION/TRAINING PROGRAM

## 2022-12-09 PROCEDURE — 99999 PR PBB SHADOW E&M-EST. PATIENT-LVL V: CPT | Mod: PBBFAC,,, | Performed by: STUDENT IN AN ORGANIZED HEALTH CARE EDUCATION/TRAINING PROGRAM

## 2022-12-09 PROCEDURE — 3008F BODY MASS INDEX DOCD: CPT | Mod: CPTII,S$GLB,, | Performed by: STUDENT IN AN ORGANIZED HEALTH CARE EDUCATION/TRAINING PROGRAM

## 2022-12-09 PROCEDURE — 1125F PR PAIN SEVERITY QUANTIFIED, PAIN PRESENT: ICD-10-PCS | Mod: CPTII,S$GLB,, | Performed by: STUDENT IN AN ORGANIZED HEALTH CARE EDUCATION/TRAINING PROGRAM

## 2022-12-09 PROCEDURE — 77386 HC IMRT, COMPLEX: CPT | Performed by: RADIOLOGY

## 2022-12-09 PROCEDURE — 99215 PR OFFICE/OUTPT VISIT, EST, LEVL V, 40-54 MIN: ICD-10-PCS | Mod: S$GLB,,, | Performed by: STUDENT IN AN ORGANIZED HEALTH CARE EDUCATION/TRAINING PROGRAM

## 2022-12-09 PROCEDURE — 77014 HC CT GUIDANCE RADIATION THERAPY FLDS PLACEMENT: CPT | Mod: TC | Performed by: RADIOLOGY

## 2022-12-09 PROCEDURE — 99999 PR PBB SHADOW E&M-EST. PATIENT-LVL V: ICD-10-PCS | Mod: PBBFAC,,, | Performed by: STUDENT IN AN ORGANIZED HEALTH CARE EDUCATION/TRAINING PROGRAM

## 2022-12-09 PROCEDURE — 3044F HG A1C LEVEL LT 7.0%: CPT | Mod: CPTII,S$GLB,, | Performed by: STUDENT IN AN ORGANIZED HEALTH CARE EDUCATION/TRAINING PROGRAM

## 2022-12-09 PROCEDURE — 3044F PR MOST RECENT HEMOGLOBIN A1C LEVEL <7.0%: ICD-10-PCS | Mod: CPTII,S$GLB,, | Performed by: STUDENT IN AN ORGANIZED HEALTH CARE EDUCATION/TRAINING PROGRAM

## 2022-12-09 PROCEDURE — 4010F ACE/ARB THERAPY RXD/TAKEN: CPT | Mod: CPTII,S$GLB,, | Performed by: STUDENT IN AN ORGANIZED HEALTH CARE EDUCATION/TRAINING PROGRAM

## 2022-12-09 PROCEDURE — 3078F PR MOST RECENT DIASTOLIC BLOOD PRESSURE < 80 MM HG: ICD-10-PCS | Mod: CPTII,S$GLB,, | Performed by: STUDENT IN AN ORGANIZED HEALTH CARE EDUCATION/TRAINING PROGRAM

## 2022-12-09 PROCEDURE — 77014 PR  CT GUIDANCE PLACEMENT RAD THERAPY FIELDS: ICD-10-PCS | Mod: 26,,, | Performed by: RADIOLOGY

## 2022-12-09 PROCEDURE — 63600175 PHARM REV CODE 636 W HCPCS: Performed by: STUDENT IN AN ORGANIZED HEALTH CARE EDUCATION/TRAINING PROGRAM

## 2022-12-09 PROCEDURE — 4010F PR ACE/ARB THEARPY RXD/TAKEN: ICD-10-PCS | Mod: CPTII,S$GLB,, | Performed by: STUDENT IN AN ORGANIZED HEALTH CARE EDUCATION/TRAINING PROGRAM

## 2022-12-09 PROCEDURE — 96375 TX/PRO/DX INJ NEW DRUG ADDON: CPT

## 2022-12-09 PROCEDURE — 96417 CHEMO IV INFUS EACH ADDL SEQ: CPT

## 2022-12-09 PROCEDURE — 96413 CHEMO IV INFUSION 1 HR: CPT

## 2022-12-09 PROCEDURE — 3074F PR MOST RECENT SYSTOLIC BLOOD PRESSURE < 130 MM HG: ICD-10-PCS | Mod: CPTII,S$GLB,, | Performed by: STUDENT IN AN ORGANIZED HEALTH CARE EDUCATION/TRAINING PROGRAM

## 2022-12-09 RX ORDER — FAMOTIDINE 10 MG/ML
20 INJECTION INTRAVENOUS
Status: COMPLETED | OUTPATIENT
Start: 2022-12-09 | End: 2022-12-09

## 2022-12-09 RX ORDER — DIPHENHYDRAMINE HYDROCHLORIDE 50 MG/ML
50 INJECTION INTRAMUSCULAR; INTRAVENOUS ONCE AS NEEDED
Status: CANCELLED | OUTPATIENT
Start: 2022-12-09

## 2022-12-09 RX ORDER — SODIUM CHLORIDE 0.9 % (FLUSH) 0.9 %
10 SYRINGE (ML) INJECTION
Status: DISCONTINUED | OUTPATIENT
Start: 2022-12-09 | End: 2022-12-09 | Stop reason: HOSPADM

## 2022-12-09 RX ORDER — MAGNESIUM SULFATE HEPTAHYDRATE 40 MG/ML
4 INJECTION, SOLUTION INTRAVENOUS
Status: CANCELLED
Start: 2022-12-09

## 2022-12-09 RX ORDER — FAMOTIDINE 10 MG/ML
20 INJECTION INTRAVENOUS
Status: CANCELLED | OUTPATIENT
Start: 2022-12-09 | End: 2022-12-09

## 2022-12-09 RX ORDER — EPINEPHRINE 0.3 MG/.3ML
0.3 INJECTION SUBCUTANEOUS ONCE AS NEEDED
Status: CANCELLED | OUTPATIENT
Start: 2022-12-09

## 2022-12-09 RX ORDER — MAGNESIUM SULFATE HEPTAHYDRATE 40 MG/ML
2 INJECTION, SOLUTION INTRAVENOUS
Status: COMPLETED | OUTPATIENT
Start: 2022-12-09 | End: 2022-12-09

## 2022-12-09 RX ORDER — EPINEPHRINE 0.3 MG/.3ML
0.3 INJECTION SUBCUTANEOUS ONCE AS NEEDED
Status: DISCONTINUED | OUTPATIENT
Start: 2022-12-09 | End: 2022-12-09 | Stop reason: HOSPADM

## 2022-12-09 RX ORDER — DIPHENHYDRAMINE HYDROCHLORIDE 50 MG/ML
50 INJECTION INTRAMUSCULAR; INTRAVENOUS ONCE AS NEEDED
Status: DISCONTINUED | OUTPATIENT
Start: 2022-12-09 | End: 2022-12-09 | Stop reason: HOSPADM

## 2022-12-09 RX ORDER — SODIUM CHLORIDE 0.9 % (FLUSH) 0.9 %
10 SYRINGE (ML) INJECTION
Status: CANCELLED | OUTPATIENT
Start: 2022-12-09

## 2022-12-09 RX ORDER — HEPARIN 100 UNIT/ML
500 SYRINGE INTRAVENOUS
Status: DISCONTINUED | OUTPATIENT
Start: 2022-12-09 | End: 2022-12-09 | Stop reason: HOSPADM

## 2022-12-09 RX ORDER — HEPARIN 100 UNIT/ML
500 SYRINGE INTRAVENOUS
Status: CANCELLED | OUTPATIENT
Start: 2022-12-09

## 2022-12-09 RX ADMIN — CARBOPLATIN 135 MG: 10 INJECTION, SOLUTION INTRAVENOUS at 03:12

## 2022-12-09 RX ADMIN — MAGNESIUM SULFATE IN WATER 2 G: 40 INJECTION, SOLUTION INTRAVENOUS at 10:12

## 2022-12-09 RX ADMIN — PACLITAXEL 84 MG: 6 INJECTION, SOLUTION INTRAVENOUS at 02:12

## 2022-12-09 RX ADMIN — MAGNESIUM SULFATE IN WATER 2 G: 40 INJECTION, SOLUTION INTRAVENOUS at 09:12

## 2022-12-09 RX ADMIN — FAMOTIDINE 20 MG: 10 INJECTION INTRAVENOUS at 01:12

## 2022-12-09 RX ADMIN — DIPHENHYDRAMINE HYDROCHLORIDE 50 MG: 50 INJECTION, SOLUTION INTRAMUSCULAR; INTRAVENOUS at 02:12

## 2022-12-09 RX ADMIN — SODIUM CHLORIDE: 0.9 INJECTION, SOLUTION INTRAVENOUS at 02:12

## 2022-12-09 RX ADMIN — DEXAMETHASONE SODIUM PHOSPHATE 0.25 MG: 4 INJECTION, SOLUTION INTRA-ARTICULAR; INTRALESIONAL; INTRAMUSCULAR; INTRAVENOUS; SOFT TISSUE at 01:12

## 2022-12-09 NOTE — PLAN OF CARE
Magnesium 4 grams IVPB, Paclitaxel chemotherapy infusion and Carboplatin chemotherapy infusion administered, no reaction. Patient tolerated well. 24 gauge IV removed, catheter intact. No apparent distress noted. Discharge instructions given to patient. Patient understands instructions. Follow up appointment scheduled.

## 2022-12-09 NOTE — PROGRESS NOTES
MAURY left  with patient requesting a call back if patient needs transportation for another time other than 12:45p pickup Mon-Thurs of next week.    MAURY arranged transportation for the above time with Christiano at [facility].

## 2022-12-09 NOTE — PROGRESS NOTES
Hematology - oncology PROGRESS NOTE     Subjective:       Patient ID: Nalini Varma is a 72 y.o. female.     Chief Complaint: follow up for chronic anemia and lung cancer     Diagnosis:  1. Anemia of chronic disease  2. Stage I Lung adenocarcinoma of right upper lobe, lepidic pattern, s/p SBRT in Oct/Nov 2021  3. Recurrent Lung cancer 2022     Oncologic History:  1. Ms Varma is a 68 yo woman with chronic pain, anxiety, COPD on 2 liters of home oxygen, CHF with preserved ejection fraction, HTN, CKD stage 3 (creatinine 1.6), who presents for further evaluation of newly diagnosed lung adenocarcinoma. She underwent a screening CT chest on 7/23/20, which showed a 0.6 cm right upper lobe noncalcified pulmonary nodule which measured 0.4 cm on previous CT dated 03/20/2019.  There is a pleural based 1.2 cm pulmonary nodule within the right upper lobe which measured 0.9 cm on previous CT.  There is a 1.2 cm partially spiculated right upper lobe noncalcified pulmonary nodule which measured 0.6 cm on previous CT. She underwent a right lung biopsy on 9/4/2020. Pathology showed adenocarcinoma, well differentiated, lepidic pattern, cannot rule out an invasive component. She presents today for further evaluation. Has been smoking 2PPD for 50 years. Enrolled in smoking cessation program. Now down to 1PPD. Has been on 2L O2 for many years. Needs to use 3 liters when she is wearing a mask. Has CHF. On torsemide. Uses two pillows at night. Unable to walk for a block due to dyspnea. No weight loss. Lives by herself  2. CT head negative for metastases. PET scan 10/7/20: Two right upper lobe nodule with no appreciable activity.  Please note that FDG PET has poor sensitivity for small, well differentiated, and/or indolent malignancies.  An additional subcentimeter node in the apex is too small to characterize by PET for which attention on follow-up is recommended. Mildly enlarged mediastinal lymph nodes, as above, with no  "hypermetabolic activity.  Metastatic involvement is not excluded.  3. Seen by Dr Mead. Underwent SBRT 10/29/21-21       Interval History:   Patient tolerated chemotherapy well except for some fatigue.  Mild nausea which is controlled with Zofran.    ECO     ROS:   A ten-point system review is obtained and negative except for what was stated in the Interval History.      Physical Examination:   /62 (BP Location: Left arm, Patient Position: Sitting, BP Method: Medium (Automatic))   Pulse 86   Temp 98.4 °F (36.9 °C) (Oral)   Resp 20   Ht 5' 6" (1.676 m)   Wt 98.3 kg (216 lb 11.4 oz)   SpO2 100% Comment: 3 liters O2  BMI 34.98 kg/m²     Physical Exam  Vitals and nursing note reviewed.   Constitutional:       General: She is not in acute distress.  HENT:      Head: Normocephalic and atraumatic.      Mouth/Throat:      Pharynx: No oropharyngeal exudate.   Eyes:      General: No scleral icterus.        Right eye: No discharge.         Left eye: No discharge.   Neck:      Thyroid: No thyromegaly.      Trachea: No tracheal deviation.   Cardiovascular:      Rate and Rhythm: Normal rate and regular rhythm.      Heart sounds: Normal heart sounds. No murmur heard.  Pulmonary:      Effort: Pulmonary effort is normal. No respiratory distress.      Breath sounds: Normal breath sounds. Decreased air movement present. No wheezing or rales.   Abdominal:      General: Bowel sounds are normal. There is no distension.      Palpations: Abdomen is soft.      Tenderness: There is no abdominal tenderness.   Musculoskeletal:         General: No tenderness. Normal range of motion.      Cervical back: Normal range of motion and neck supple.   Lymphadenopathy:      Cervical: No cervical adenopathy.   Skin:     General: Skin is warm and dry.      Findings: No rash.   Neurological:      Mental Status: She is alert and oriented to person, place, and time.      Cranial Nerves: No cranial nerve deficit.      Gait: Gait is " intact.   Psychiatric:         Mood and Affect: Mood and affect normal.       Objective:      Laboratory Data:  Labs reviewed.      Imaging Data:  PET scan 10/7/2020:  Impression:     1. Two right upper lobe nodule with no appreciable activity.  Please note that FDG PET has poor sensitivity for small, well differentiated, and/or indolent malignancies.  An additional subcentimeter node in the apex is too small to characterize by PET for which attention on follow-up is recommended.  2. Mildly enlarged mediastinal lymph nodes, as above, with no hypermetabolic activity.  Metastatic involvement is not excluded.  3. Additional CT findings, as above.     CT head 9/25/20:  Impression:     No evidence of acute intracranial pathology.     Moderate patchy mucoperiosteal thickening in the paranasal sinuses.    CT chest 2/11/2021:  Development of central cavitation in previously described 12 mm right upper lobe pulmonary nodule which may be inflammatory in nature including tuberculous and non tuberculous mycobacterial infection, fungal infection etc.  Neoplasm is also included in the differential.     Improved subpleural right upper lobe nodular opacity and stable ground-glass nodule right apex.     Stable calcified granuloma and postinflammatory changes in the lingula probably related to previous non tuberculous mycobacterial infection.     Severe atherosclerosis and severe hepatic steatosis            Assessment and Plan:      1. Recurrent adenocarcinoma of left lung    2. Secondary malignant neoplasm of intrathoracic lymph nodes    3. Chronic respiratory failure with hypoxia    4. Iron deficiency anemia due to chronic blood loss          Recurrent lung cancer   Pt had adenocarcinoma which was treated with SBRT in Oct/Nov 2020 with Dr Mead. Recent imaging with CT and PET concerning for recurrence and jamal involvement. She is poor surgical candidate. We discussed weekly chemo along with radiation. If pt is not able to  tolerate the chemo, will dose reduce or discontinue chemo. Pt understands the risks and benefits and prefers to try chemo with radiation with the understanding that if treatment becomes too uncomfortable we will hold chemo and pursue with radiation alone.   Had repeat biopsy procedure, couldn't get tissue, had PTX, req chest tube  Repeat imaging concerning for recurrent and progression of ds. Case d/w Dr Mead over phone on 11/7/22, concurrent chemo radiation to be tried. Plan for weekly carbo taxol . If pt is unable to tolerate chemo or has transportation issues, Dr Mead plans to change back to hypo fractionated course of radiation. Due to performance status and cor morbidities pt is not a candidate for Q3 week chemotherapy.   Labs reviewed, adequate, proceed with weekly carbotaxol #3.  Replace magnesium    LESLY- on venofer, monitor.    Pancreatic lesion- being followed by Dr Cheema.             Future Appointments   Date Time Provider Department Center   12/12/2022 11:00 AM [facility] PSYCH [address]   12/16/2022  9:30 AM CHEMO 07 [facility] CHEMO [facility]   1/25/2023  1:30 PM Yane Sahni MD [facility] IM [facility]

## 2022-12-12 ENCOUNTER — PATIENT MESSAGE (OUTPATIENT)
Dept: HEMATOLOGY/ONCOLOGY | Facility: CLINIC | Age: 72
End: 2022-12-12
Payer: MEDICARE

## 2022-12-15 ENCOUNTER — PATIENT MESSAGE (OUTPATIENT)
Dept: INTERNAL MEDICINE | Facility: CLINIC | Age: 72
End: 2022-12-15
Payer: MEDICARE

## 2022-12-15 DIAGNOSIS — G89.4 CHRONIC PAIN SYNDROME: ICD-10-CM

## 2022-12-15 DIAGNOSIS — M17.0 PRIMARY OSTEOARTHRITIS OF BOTH KNEES: ICD-10-CM

## 2022-12-15 PROCEDURE — 77014 HC CT GUIDANCE RADIATION THERAPY FLDS PLACEMENT: CPT | Mod: TC | Performed by: RADIOLOGY

## 2022-12-15 PROCEDURE — 77014 PR  CT GUIDANCE PLACEMENT RAD THERAPY FIELDS: CPT | Mod: 26,,, | Performed by: RADIOLOGY

## 2022-12-15 PROCEDURE — 77014 PR  CT GUIDANCE PLACEMENT RAD THERAPY FIELDS: ICD-10-PCS | Mod: 26,,, | Performed by: RADIOLOGY

## 2022-12-15 PROCEDURE — 77386 HC IMRT, COMPLEX: CPT | Performed by: RADIOLOGY

## 2022-12-15 RX ORDER — HYDROCODONE BITARTRATE AND ACETAMINOPHEN 10; 325 MG/1; MG/1
1 TABLET ORAL EVERY 12 HOURS PRN
Qty: 60 TABLET | Refills: 0 | Status: SHIPPED | OUTPATIENT
Start: 2022-12-15 | End: 2023-01-17 | Stop reason: SDUPTHER

## 2022-12-15 NOTE — TELEPHONE ENCOUNTER
No new care gaps identified.  [facility] Embedded Care Gaps. Reference number: [id number]. 12/15/2022   10:55:41 AM CST

## 2022-12-16 ENCOUNTER — OFFICE VISIT (OUTPATIENT)
Dept: HEMATOLOGY/ONCOLOGY | Facility: CLINIC | Age: 72
End: 2022-12-16
Payer: MEDICARE

## 2022-12-16 ENCOUNTER — INFUSION (OUTPATIENT)
Dept: INFUSION THERAPY | Facility: OTHER | Age: 72
End: 2022-12-16
Attending: INTERNAL MEDICINE
Payer: MEDICARE

## 2022-12-16 ENCOUNTER — DOCUMENTATION ONLY (OUTPATIENT)
Dept: HEMATOLOGY/ONCOLOGY | Facility: CLINIC | Age: 72
End: 2022-12-16
Payer: MEDICARE

## 2022-12-16 VITALS
SYSTOLIC BLOOD PRESSURE: 142 MMHG | HEART RATE: 80 BPM | DIASTOLIC BLOOD PRESSURE: 66 MMHG | WEIGHT: 216.06 LBS | HEIGHT: 66 IN | TEMPERATURE: 99 F | RESPIRATION RATE: 20 BRPM | BODY MASS INDEX: 34.72 KG/M2 | OXYGEN SATURATION: 97 %

## 2022-12-16 VITALS — HEART RATE: 89 BPM | RESPIRATION RATE: 16 BRPM | OXYGEN SATURATION: 99 %

## 2022-12-16 DIAGNOSIS — C34.92 RECURRENT ADENOCARCINOMA OF LEFT LUNG: Primary | ICD-10-CM

## 2022-12-16 DIAGNOSIS — J96.11 CHRONIC RESPIRATORY FAILURE WITH HYPOXIA: ICD-10-CM

## 2022-12-16 DIAGNOSIS — C77.1 SECONDARY MALIGNANT NEOPLASM OF INTRATHORACIC LYMPH NODES: ICD-10-CM

## 2022-12-16 PROCEDURE — 99215 OFFICE O/P EST HI 40 MIN: CPT | Mod: S$GLB,,, | Performed by: STUDENT IN AN ORGANIZED HEALTH CARE EDUCATION/TRAINING PROGRAM

## 2022-12-16 PROCEDURE — 1125F AMNT PAIN NOTED PAIN PRSNT: CPT | Mod: CPTII,S$GLB,, | Performed by: STUDENT IN AN ORGANIZED HEALTH CARE EDUCATION/TRAINING PROGRAM

## 2022-12-16 PROCEDURE — 3288F FALL RISK ASSESSMENT DOCD: CPT | Mod: CPTII,S$GLB,, | Performed by: STUDENT IN AN ORGANIZED HEALTH CARE EDUCATION/TRAINING PROGRAM

## 2022-12-16 PROCEDURE — 4010F PR ACE/ARB THEARPY RXD/TAKEN: ICD-10-PCS | Mod: CPTII,S$GLB,, | Performed by: STUDENT IN AN ORGANIZED HEALTH CARE EDUCATION/TRAINING PROGRAM

## 2022-12-16 PROCEDURE — 99999 PR PBB SHADOW E&M-EST. PATIENT-LVL III: ICD-10-PCS | Mod: PBBFAC,,, | Performed by: STUDENT IN AN ORGANIZED HEALTH CARE EDUCATION/TRAINING PROGRAM

## 2022-12-16 PROCEDURE — 77014 HC CT GUIDANCE RADIATION THERAPY FLDS PLACEMENT: CPT | Mod: TC | Performed by: RADIOLOGY

## 2022-12-16 PROCEDURE — 3044F PR MOST RECENT HEMOGLOBIN A1C LEVEL <7.0%: ICD-10-PCS | Mod: CPTII,S$GLB,, | Performed by: STUDENT IN AN ORGANIZED HEALTH CARE EDUCATION/TRAINING PROGRAM

## 2022-12-16 PROCEDURE — 96366 THER/PROPH/DIAG IV INF ADDON: CPT

## 2022-12-16 PROCEDURE — 1159F MED LIST DOCD IN RCRD: CPT | Mod: CPTII,S$GLB,, | Performed by: STUDENT IN AN ORGANIZED HEALTH CARE EDUCATION/TRAINING PROGRAM

## 2022-12-16 PROCEDURE — 3008F BODY MASS INDEX DOCD: CPT | Mod: CPTII,S$GLB,, | Performed by: STUDENT IN AN ORGANIZED HEALTH CARE EDUCATION/TRAINING PROGRAM

## 2022-12-16 PROCEDURE — 1160F RVW MEDS BY RX/DR IN RCRD: CPT | Mod: CPTII,S$GLB,, | Performed by: STUDENT IN AN ORGANIZED HEALTH CARE EDUCATION/TRAINING PROGRAM

## 2022-12-16 PROCEDURE — 3077F PR MOST RECENT SYSTOLIC BLOOD PRESSURE >= 140 MM HG: ICD-10-PCS | Mod: CPTII,S$GLB,, | Performed by: STUDENT IN AN ORGANIZED HEALTH CARE EDUCATION/TRAINING PROGRAM

## 2022-12-16 PROCEDURE — 3078F DIAST BP <80 MM HG: CPT | Mod: CPTII,S$GLB,, | Performed by: STUDENT IN AN ORGANIZED HEALTH CARE EDUCATION/TRAINING PROGRAM

## 2022-12-16 PROCEDURE — 77336 RADIATION PHYSICS CONSULT: CPT | Performed by: RADIOLOGY

## 2022-12-16 PROCEDURE — 25000003 PHARM REV CODE 250: Performed by: STUDENT IN AN ORGANIZED HEALTH CARE EDUCATION/TRAINING PROGRAM

## 2022-12-16 PROCEDURE — 1101F PR PT FALLS ASSESS DOC 0-1 FALLS W/OUT INJ PAST YR: ICD-10-PCS | Mod: CPTII,S$GLB,, | Performed by: STUDENT IN AN ORGANIZED HEALTH CARE EDUCATION/TRAINING PROGRAM

## 2022-12-16 PROCEDURE — 96375 TX/PRO/DX INJ NEW DRUG ADDON: CPT

## 2022-12-16 PROCEDURE — 77014 PR  CT GUIDANCE PLACEMENT RAD THERAPY FIELDS: CPT | Mod: 26,,, | Performed by: RADIOLOGY

## 2022-12-16 PROCEDURE — 96417 CHEMO IV INFUS EACH ADDL SEQ: CPT

## 2022-12-16 PROCEDURE — 1159F PR MEDICATION LIST DOCUMENTED IN MEDICAL RECORD: ICD-10-PCS | Mod: CPTII,S$GLB,, | Performed by: STUDENT IN AN ORGANIZED HEALTH CARE EDUCATION/TRAINING PROGRAM

## 2022-12-16 PROCEDURE — 99215 PR OFFICE/OUTPT VISIT, EST, LEVL V, 40-54 MIN: ICD-10-PCS | Mod: S$GLB,,, | Performed by: STUDENT IN AN ORGANIZED HEALTH CARE EDUCATION/TRAINING PROGRAM

## 2022-12-16 PROCEDURE — 3044F HG A1C LEVEL LT 7.0%: CPT | Mod: CPTII,S$GLB,, | Performed by: STUDENT IN AN ORGANIZED HEALTH CARE EDUCATION/TRAINING PROGRAM

## 2022-12-16 PROCEDURE — 99999 PR PBB SHADOW E&M-EST. PATIENT-LVL III: CPT | Mod: PBBFAC,,, | Performed by: STUDENT IN AN ORGANIZED HEALTH CARE EDUCATION/TRAINING PROGRAM

## 2022-12-16 PROCEDURE — 1101F PT FALLS ASSESS-DOCD LE1/YR: CPT | Mod: CPTII,S$GLB,, | Performed by: STUDENT IN AN ORGANIZED HEALTH CARE EDUCATION/TRAINING PROGRAM

## 2022-12-16 PROCEDURE — 1125F PR PAIN SEVERITY QUANTIFIED, PAIN PRESENT: ICD-10-PCS | Mod: CPTII,S$GLB,, | Performed by: STUDENT IN AN ORGANIZED HEALTH CARE EDUCATION/TRAINING PROGRAM

## 2022-12-16 PROCEDURE — 3008F PR BODY MASS INDEX (BMI) DOCUMENTED: ICD-10-PCS | Mod: CPTII,S$GLB,, | Performed by: STUDENT IN AN ORGANIZED HEALTH CARE EDUCATION/TRAINING PROGRAM

## 2022-12-16 PROCEDURE — 3078F PR MOST RECENT DIASTOLIC BLOOD PRESSURE < 80 MM HG: ICD-10-PCS | Mod: CPTII,S$GLB,, | Performed by: STUDENT IN AN ORGANIZED HEALTH CARE EDUCATION/TRAINING PROGRAM

## 2022-12-16 PROCEDURE — 4010F ACE/ARB THERAPY RXD/TAKEN: CPT | Mod: CPTII,S$GLB,, | Performed by: STUDENT IN AN ORGANIZED HEALTH CARE EDUCATION/TRAINING PROGRAM

## 2022-12-16 PROCEDURE — 96413 CHEMO IV INFUSION 1 HR: CPT

## 2022-12-16 PROCEDURE — 3288F PR FALLS RISK ASSESSMENT DOCUMENTED: ICD-10-PCS | Mod: CPTII,S$GLB,, | Performed by: STUDENT IN AN ORGANIZED HEALTH CARE EDUCATION/TRAINING PROGRAM

## 2022-12-16 PROCEDURE — 96367 TX/PROPH/DG ADDL SEQ IV INF: CPT

## 2022-12-16 PROCEDURE — 63600175 PHARM REV CODE 636 W HCPCS: Performed by: STUDENT IN AN ORGANIZED HEALTH CARE EDUCATION/TRAINING PROGRAM

## 2022-12-16 PROCEDURE — 1160F PR REVIEW ALL MEDS BY PRESCRIBER/CLIN PHARMACIST DOCUMENTED: ICD-10-PCS | Mod: CPTII,S$GLB,, | Performed by: STUDENT IN AN ORGANIZED HEALTH CARE EDUCATION/TRAINING PROGRAM

## 2022-12-16 PROCEDURE — 77014 PR  CT GUIDANCE PLACEMENT RAD THERAPY FIELDS: ICD-10-PCS | Mod: 26,,, | Performed by: RADIOLOGY

## 2022-12-16 PROCEDURE — 3077F SYST BP >= 140 MM HG: CPT | Mod: CPTII,S$GLB,, | Performed by: STUDENT IN AN ORGANIZED HEALTH CARE EDUCATION/TRAINING PROGRAM

## 2022-12-16 PROCEDURE — 77386 HC IMRT, COMPLEX: CPT | Performed by: RADIOLOGY

## 2022-12-16 RX ORDER — DIPHENHYDRAMINE HYDROCHLORIDE 50 MG/ML
50 INJECTION INTRAMUSCULAR; INTRAVENOUS ONCE AS NEEDED
Status: CANCELLED | OUTPATIENT
Start: 2022-12-16

## 2022-12-16 RX ORDER — EPINEPHRINE 0.3 MG/.3ML
0.3 INJECTION SUBCUTANEOUS ONCE AS NEEDED
Status: CANCELLED | OUTPATIENT
Start: 2022-12-16

## 2022-12-16 RX ORDER — HEPARIN 100 UNIT/ML
500 SYRINGE INTRAVENOUS
Status: CANCELLED | OUTPATIENT
Start: 2022-12-16

## 2022-12-16 RX ORDER — MAGNESIUM SULFATE HEPTAHYDRATE 40 MG/ML
4 INJECTION, SOLUTION INTRAVENOUS
Status: COMPLETED | OUTPATIENT
Start: 2022-12-16 | End: 2022-12-16

## 2022-12-16 RX ORDER — MAGNESIUM SULFATE HEPTAHYDRATE 40 MG/ML
4 INJECTION, SOLUTION INTRAVENOUS
Status: CANCELLED
Start: 2022-12-16

## 2022-12-16 RX ORDER — SODIUM CHLORIDE 0.9 % (FLUSH) 0.9 %
10 SYRINGE (ML) INJECTION
Status: CANCELLED | OUTPATIENT
Start: 2022-12-16

## 2022-12-16 RX ORDER — EPINEPHRINE 0.3 MG/.3ML
0.3 INJECTION SUBCUTANEOUS ONCE AS NEEDED
Status: DISCONTINUED | OUTPATIENT
Start: 2022-12-16 | End: 2022-12-16 | Stop reason: HOSPADM

## 2022-12-16 RX ORDER — FAMOTIDINE 10 MG/ML
20 INJECTION INTRAVENOUS
Status: CANCELLED | OUTPATIENT
Start: 2022-12-16 | End: 2022-12-16

## 2022-12-16 RX ORDER — DIPHENHYDRAMINE HYDROCHLORIDE 50 MG/ML
50 INJECTION INTRAMUSCULAR; INTRAVENOUS ONCE AS NEEDED
Status: DISCONTINUED | OUTPATIENT
Start: 2022-12-16 | End: 2022-12-16 | Stop reason: HOSPADM

## 2022-12-16 RX ORDER — FAMOTIDINE 10 MG/ML
20 INJECTION INTRAVENOUS
Status: COMPLETED | OUTPATIENT
Start: 2022-12-16 | End: 2022-12-16

## 2022-12-16 RX ADMIN — DIPHENHYDRAMINE HYDROCHLORIDE 50 MG: 50 INJECTION, SOLUTION INTRAMUSCULAR; INTRAVENOUS at 01:12

## 2022-12-16 RX ADMIN — SODIUM CHLORIDE: 0.9 INJECTION, SOLUTION INTRAVENOUS at 01:12

## 2022-12-16 RX ADMIN — DEXAMETHASONE SODIUM PHOSPHATE 0.25 MG: 4 INJECTION, SOLUTION INTRAMUSCULAR; INTRAVENOUS at 01:12

## 2022-12-16 RX ADMIN — FAMOTIDINE 20 MG: 10 INJECTION INTRAVENOUS at 01:12

## 2022-12-16 RX ADMIN — PACLITAXEL 84 MG: 6 INJECTION, SOLUTION, CONCENTRATE INTRAVENOUS at 01:12

## 2022-12-16 RX ADMIN — CARBOPLATIN 165 MG: 10 INJECTION, SOLUTION INTRAVENOUS at 02:12

## 2022-12-16 RX ADMIN — MAGNESIUM SULFATE IN WATER 4 G: 40 INJECTION, SOLUTION INTRAVENOUS at 09:12

## 2022-12-16 NOTE — PLAN OF CARE
Magnesium 4 grams IVPB, Paclitaxel chemotherapy infusion and Carboplatin chemotherapy infusion administered no reaction. Patient tolerated well. 24 gauge IV removed, catheter intact. No apparent distress noted. Discharge instructions given to patient. Patient understands instructions. Follow up appointment scheduled.

## 2022-12-16 NOTE — PROGRESS NOTES
Hematology - oncology PROGRESS NOTE     Subjective:       Patient ID: Nalini Varma is a 72 y.o. female.     Chief Complaint: follow up for chronic anemia and lung cancer     Diagnosis:  1. Anemia of chronic disease  2. Stage I Lung adenocarcinoma of right upper lobe, lepidic pattern, s/p SBRT in Oct/Nov 2021  3. Recurrent Lung cancer 2022     Oncologic History:  1. Ms Varma is a 68 yo woman with chronic pain, anxiety, COPD on 2 liters of home oxygen, CHF with preserved ejection fraction, HTN, CKD stage 3 (creatinine 1.6), who presents for further evaluation of newly diagnosed lung adenocarcinoma. She underwent a screening CT chest on 7/23/20, which showed a 0.6 cm right upper lobe noncalcified pulmonary nodule which measured 0.4 cm on previous CT dated 03/20/2019.  There is a pleural based 1.2 cm pulmonary nodule within the right upper lobe which measured 0.9 cm on previous CT.  There is a 1.2 cm partially spiculated right upper lobe noncalcified pulmonary nodule which measured 0.6 cm on previous CT. She underwent a right lung biopsy on 9/4/2020. Pathology showed adenocarcinoma, well differentiated, lepidic pattern, cannot rule out an invasive component. She presents today for further evaluation. Has been smoking 2PPD for 50 years. Enrolled in smoking cessation program. Now down to 1PPD. Has been on 2L O2 for many years. Needs to use 3 liters when she is wearing a mask. Has CHF. On torsemide. Uses two pillows at night. Unable to walk for a block due to dyspnea. No weight loss. Lives by herself  2. CT head negative for metastases. PET scan 10/7/20: Two right upper lobe nodule with no appreciable activity.  Please note that FDG PET has poor sensitivity for small, well differentiated, and/or indolent malignancies.  An additional subcentimeter node in the apex is too small to characterize by PET for which attention on follow-up is recommended. Mildly enlarged mediastinal lymph nodes, as above, with no  "hypermetabolic activity.  Metastatic involvement is not excluded.  3. Seen by Dr Mead. Underwent SBRT 10/29/21-21       Interval History:   Patient tolerated chemotherapy well except for some fatigue.  Mild nausea which is controlled with Zofran.    ECO     ROS:   A ten-point system review is obtained and negative except for what was stated in the Interval History.      Physical Examination:   BP (!) 142/66 (BP Location: Left arm, Patient Position: Sitting, BP Method: Medium (Automatic))   Pulse 80   Temp 98.7 °F (37.1 °C) (Oral)   Resp 20   Ht 5' 6" (1.676 m)   Wt 98 kg (216 lb 0.8 oz)   SpO2 97% Comment: on 3 L per NC  BMI 34.87 kg/m²     Physical Exam  Vitals and nursing note reviewed.   Constitutional:       General: She is not in acute distress.  HENT:      Head: Normocephalic and atraumatic.      Mouth/Throat:      Pharynx: No oropharyngeal exudate.   Eyes:      General: No scleral icterus.        Right eye: No discharge.         Left eye: No discharge.   Neck:      Thyroid: No thyromegaly.      Trachea: No tracheal deviation.   Cardiovascular:      Rate and Rhythm: Normal rate and regular rhythm.      Heart sounds: Normal heart sounds. No murmur heard.  Pulmonary:      Effort: Pulmonary effort is normal. No respiratory distress.      Breath sounds: Normal breath sounds. Decreased air movement present. No wheezing or rales.   Abdominal:      General: Bowel sounds are normal. There is no distension.      Palpations: Abdomen is soft.      Tenderness: There is no abdominal tenderness.   Musculoskeletal:         General: No tenderness. Normal range of motion.      Cervical back: Normal range of motion and neck supple.   Lymphadenopathy:      Cervical: No cervical adenopathy.   Skin:     General: Skin is warm and dry.      Findings: No rash.   Neurological:      Mental Status: She is alert and oriented to person, place, and time.      Cranial Nerves: No cranial nerve deficit.      Gait: Gait is " intact.   Psychiatric:         Mood and Affect: Mood and affect normal.       Objective:      Laboratory Data:  Labs reviewed.      Imaging Data:  PET scan 10/7/2020:  Impression:     1. Two right upper lobe nodule with no appreciable activity.  Please note that FDG PET has poor sensitivity for small, well differentiated, and/or indolent malignancies.  An additional subcentimeter node in the apex is too small to characterize by PET for which attention on follow-up is recommended.  2. Mildly enlarged mediastinal lymph nodes, as above, with no hypermetabolic activity.  Metastatic involvement is not excluded.  3. Additional CT findings, as above.     CT head 9/25/20:  Impression:     No evidence of acute intracranial pathology.     Moderate patchy mucoperiosteal thickening in the paranasal sinuses.    CT chest 2/11/2021:  Development of central cavitation in previously described 12 mm right upper lobe pulmonary nodule which may be inflammatory in nature including tuberculous and non tuberculous mycobacterial infection, fungal infection etc.  Neoplasm is also included in the differential.     Improved subpleural right upper lobe nodular opacity and stable ground-glass nodule right apex.     Stable calcified granuloma and postinflammatory changes in the lingula probably related to previous non tuberculous mycobacterial infection.     Severe atherosclerosis and severe hepatic steatosis            Assessment and Plan:      1. Recurrent adenocarcinoma of left lung    2. Secondary malignant neoplasm of intrathoracic lymph nodes    3. Chronic respiratory failure with hypoxia          Recurrent lung cancer   Pt had adenocarcinoma which was treated with SBRT in Oct/Nov 2020 with Dr Mead. Recent imaging with CT and PET concerning for recurrence and jamal involvement. She is poor surgical candidate. We discussed weekly chemo along with radiation. If pt is not able to tolerate the chemo, will dose reduce or discontinue chemo. Pt  understands the risks and benefits and prefers to try chemo with radiation with the understanding that if treatment becomes too uncomfortable we will hold chemo and pursue with radiation alone.   Had repeat biopsy procedure, couldn't get tissue, had PTX, req chest tube  Repeat imaging concerning for recurrent and progression of ds. Case d/w Dr Mead over phone on 11/7/22, concurrent chemo radiation to be tried. Plan for weekly carbo taxol . If pt is unable to tolerate chemo or has transportation issues, Dr Mead plans to change back to hypo fractionated course of radiation. Due to performance status and cor morbidities pt is not a candidate for Q3 week chemotherapy.   Labs reviewed, adequate, proceed with weekly carbotaxol #4.  Replace magnesium.  Ending radiation in January.  Mild leukopenia note    LESLY- on venofer, monitor.    Pancreatic lesion- being followed by Dr Cheema.             Future Appointments   Date Time Provider Department Center   12/23/2022  7:30 AM LAB, SAME DAY [facility]DR[facility]   12/23/2022  9:00 AM Ann Gilmore MD [facility] HEM ONC [facility]   12/23/2022  9:15 AM CHEMO 06 [facility] CHEMO [facility]   1/4/2023  2:30 PM [facility] PSYCH [address]   1/25/2023  1:30 PM Yane Sahni MD [facility] IM [facility]

## 2022-12-16 NOTE — PROGRESS NOTES
MAURY called patient to discuss transportation needs for next week. Patient has 3 weeks of treatment left. Week of 12/19/22 Patient will need pickup at 12:45p Mon-Thurs and 6:45a on Friday. MAURY spoke to Christiano at [facility] to arrange for transportation.

## 2022-12-19 PROCEDURE — 77014 HC CT GUIDANCE RADIATION THERAPY FLDS PLACEMENT: CPT | Mod: TC | Performed by: RADIOLOGY

## 2022-12-19 PROCEDURE — 77014 PR  CT GUIDANCE PLACEMENT RAD THERAPY FIELDS: ICD-10-PCS | Mod: 26,,, | Performed by: RADIOLOGY

## 2022-12-19 PROCEDURE — 77386 HC IMRT, COMPLEX: CPT | Performed by: RADIOLOGY

## 2022-12-19 PROCEDURE — 77014 PR  CT GUIDANCE PLACEMENT RAD THERAPY FIELDS: CPT | Mod: 26,,, | Performed by: RADIOLOGY

## 2022-12-20 ENCOUNTER — DOCUMENTATION ONLY (OUTPATIENT)
Dept: HEMATOLOGY/ONCOLOGY | Facility: CLINIC | Age: 72
End: 2022-12-20
Payer: MEDICARE

## 2022-12-20 PROCEDURE — 77014 PR  CT GUIDANCE PLACEMENT RAD THERAPY FIELDS: CPT | Mod: 26,,, | Performed by: RADIOLOGY

## 2022-12-20 PROCEDURE — 77014 PR  CT GUIDANCE PLACEMENT RAD THERAPY FIELDS: ICD-10-PCS | Mod: 26,,, | Performed by: RADIOLOGY

## 2022-12-20 PROCEDURE — 77014 HC CT GUIDANCE RADIATION THERAPY FLDS PLACEMENT: CPT | Mod: TC | Performed by: RADIOLOGY

## 2022-12-20 PROCEDURE — 77386 HC IMRT, COMPLEX: CPT | Performed by: RADIOLOGY

## 2022-12-20 NOTE — PROGRESS NOTES
SW received multiple calls from patient regarding transportation not arriving.  When speaking to Christiano at [employer] he explained that a car will be there.     Patient called back, cab still not there. SW looking to contact radiation to  let them know the patient will be late. Patient called back while SW was looking for radiation number.    Patient called during that time to explain that her cab had arrived.

## 2022-12-21 ENCOUNTER — DOCUMENTATION ONLY (OUTPATIENT)
Dept: RADIATION ONCOLOGY | Facility: CLINIC | Age: 72
End: 2022-12-21
Payer: MEDICARE

## 2022-12-21 PROCEDURE — 77014 PR  CT GUIDANCE PLACEMENT RAD THERAPY FIELDS: ICD-10-PCS | Mod: 26,,, | Performed by: RADIOLOGY

## 2022-12-21 PROCEDURE — 77014 PR  CT GUIDANCE PLACEMENT RAD THERAPY FIELDS: CPT | Mod: 26,,, | Performed by: RADIOLOGY

## 2022-12-21 PROCEDURE — 77014 HC CT GUIDANCE RADIATION THERAPY FLDS PLACEMENT: CPT | Mod: TC | Performed by: RADIOLOGY

## 2022-12-21 PROCEDURE — 77386 HC IMRT, COMPLEX: CPT | Performed by: RADIOLOGY

## 2022-12-22 PROCEDURE — 77014 PR  CT GUIDANCE PLACEMENT RAD THERAPY FIELDS: CPT | Mod: 26,,, | Performed by: RADIOLOGY

## 2022-12-22 PROCEDURE — 77014 PR  CT GUIDANCE PLACEMENT RAD THERAPY FIELDS: ICD-10-PCS | Mod: 26,,, | Performed by: RADIOLOGY

## 2022-12-22 PROCEDURE — 77386 HC IMRT, COMPLEX: CPT | Performed by: RADIOLOGY

## 2022-12-22 PROCEDURE — 77014 HC CT GUIDANCE RADIATION THERAPY FLDS PLACEMENT: CPT | Mod: TC | Performed by: RADIOLOGY

## 2022-12-22 NOTE — PLAN OF CARE
Pt. On day 18 of outpt. Xrt to lung.  Pt. In WC.  2dY3edk NC.  Intermittent diarrhea.  Productive cough. No dysphagia.

## 2022-12-23 ENCOUNTER — HOSPITAL ENCOUNTER (INPATIENT)
Facility: OTHER | Age: 72
LOS: 4 days | Discharge: HOME | DRG: 291 | End: 2022-12-27
Attending: EMERGENCY MEDICINE | Admitting: INTERNAL MEDICINE
Payer: MEDICARE

## 2022-12-23 ENCOUNTER — DOCUMENTATION ONLY (OUTPATIENT)
Dept: HEMATOLOGY/ONCOLOGY | Facility: CLINIC | Age: 72
End: 2022-12-23
Payer: MEDICARE

## 2022-12-23 ENCOUNTER — OFFICE VISIT (OUTPATIENT)
Dept: HEMATOLOGY/ONCOLOGY | Facility: CLINIC | Age: 72
DRG: 291 | End: 2022-12-23
Payer: MEDICARE

## 2022-12-23 VITALS
DIASTOLIC BLOOD PRESSURE: 39 MMHG | OXYGEN SATURATION: 98 % | SYSTOLIC BLOOD PRESSURE: 68 MMHG | RESPIRATION RATE: 22 BRPM | BODY MASS INDEX: 34.12 KG/M2 | HEIGHT: 66 IN | TEMPERATURE: 99 F | WEIGHT: 212.31 LBS | HEART RATE: 90 BPM

## 2022-12-23 DIAGNOSIS — N17.9 ACUTE KIDNEY INJURY SUPERIMPOSED ON CKD: ICD-10-CM

## 2022-12-23 DIAGNOSIS — I95.9 HYPOTENSION: Primary | ICD-10-CM

## 2022-12-23 DIAGNOSIS — E87.1 HYPONATREMIA: ICD-10-CM

## 2022-12-23 DIAGNOSIS — D64.9 ANEMIA, UNSPECIFIED TYPE: ICD-10-CM

## 2022-12-23 DIAGNOSIS — R65.21 SEPTIC SHOCK: ICD-10-CM

## 2022-12-23 DIAGNOSIS — A41.9 SEPTIC SHOCK: ICD-10-CM

## 2022-12-23 DIAGNOSIS — N18.9 ACUTE KIDNEY INJURY SUPERIMPOSED ON CKD: ICD-10-CM

## 2022-12-23 DIAGNOSIS — C34.92 RECURRENT ADENOCARCINOMA OF LEFT LUNG: Primary | ICD-10-CM

## 2022-12-23 DIAGNOSIS — E83.42 HYPOMAGNESEMIA: ICD-10-CM

## 2022-12-23 DIAGNOSIS — R19.7 DIARRHEA, UNSPECIFIED TYPE: ICD-10-CM

## 2022-12-23 DIAGNOSIS — I50.42 CHRONIC COMBINED SYSTOLIC AND DIASTOLIC HEART FAILURE: ICD-10-CM

## 2022-12-23 DIAGNOSIS — C77.1 SECONDARY MALIGNANT NEOPLASM OF INTRATHORACIC LYMPH NODES: ICD-10-CM

## 2022-12-23 DIAGNOSIS — R19.7 DIARRHEA OF PRESUMED INFECTIOUS ORIGIN: ICD-10-CM

## 2022-12-23 DIAGNOSIS — J96.11 CHRONIC RESPIRATORY FAILURE WITH HYPOXIA: ICD-10-CM

## 2022-12-23 DIAGNOSIS — E83.51 HYPOCALCEMIA: ICD-10-CM

## 2022-12-23 DIAGNOSIS — I95.9 HYPOTENSION, UNSPECIFIED HYPOTENSION TYPE: ICD-10-CM

## 2022-12-23 DIAGNOSIS — E86.0 DEHYDRATION: ICD-10-CM

## 2022-12-23 DIAGNOSIS — J90 PLEURAL EFFUSION: ICD-10-CM

## 2022-12-23 PROBLEM — Z71.89 ADVANCE CARE PLANNING: Status: ACTIVE | Noted: 2022-12-23

## 2022-12-23 PROBLEM — E22.2 SYNDROME OF INAPPROPRIATE SECRETION OF ANTIDIURETIC HORMONE: Chronic | Status: ACTIVE | Noted: 2022-10-02

## 2022-12-23 PROBLEM — E66.01 SEVERE OBESITY: Chronic | Status: ACTIVE | Noted: 2022-04-29

## 2022-12-23 PROBLEM — N18.30 CKD (CHRONIC KIDNEY DISEASE) STAGE 3, GFR 30-59 ML/MIN: Chronic | Status: ACTIVE | Noted: 2017-01-01

## 2022-12-23 PROBLEM — D63.8 ANEMIA OF CHRONIC DISEASE: Status: ACTIVE | Noted: 2022-12-23

## 2022-12-23 LAB
ABO + RH BLD: NORMAL
ALBUMIN SERPL BCP-MCNC: 2.2 G/DL (ref 3.5–5.2)
ALP SERPL-CCNC: 87 U/L (ref 55–135)
ALT SERPL W/O P-5'-P-CCNC: 23 U/L (ref 10–44)
ANION GAP SERPL CALC-SCNC: 11 MMOL/L (ref 8–16)
AST SERPL-CCNC: 27 U/L (ref 10–40)
BASOPHILS # BLD AUTO: 0.02 K/UL (ref 0–0.2)
BASOPHILS NFR BLD: 0.3 % (ref 0–1.9)
BILIRUB SERPL-MCNC: 0.4 MG/DL (ref 0.1–1)
BLD GP AB SCN CELLS X3 SERPL QL: NORMAL
BLD PROD TYP BPU: NORMAL
BLD PROD TYP BPU: NORMAL
BLOOD UNIT EXPIRATION DATE: NORMAL
BLOOD UNIT EXPIRATION DATE: NORMAL
BLOOD UNIT TYPE CODE: 5100
BLOOD UNIT TYPE CODE: 5100
BLOOD UNIT TYPE: NORMAL
BLOOD UNIT TYPE: NORMAL
BNP SERPL-MCNC: 241 PG/ML (ref 0–99)
BUN SERPL-MCNC: 39 MG/DL (ref 8–23)
CALCIUM SERPL-MCNC: 6.9 MG/DL (ref 8.7–10.5)
CHLORIDE SERPL-SCNC: 98 MMOL/L (ref 95–110)
CO2 SERPL-SCNC: 20 MMOL/L (ref 23–29)
CODING SYSTEM: NORMAL
CODING SYSTEM: NORMAL
CREAT SERPL-MCNC: 2.1 MG/DL (ref 0.5–1.4)
CTP QC/QA: YES
CTP QC/QA: YES
DIFFERENTIAL METHOD: ABNORMAL
DISPENSE STATUS: NORMAL
DISPENSE STATUS: NORMAL
EOSINOPHIL # BLD AUTO: 0 K/UL (ref 0–0.5)
EOSINOPHIL NFR BLD: 0.1 % (ref 0–8)
ERYTHROCYTE [DISTWIDTH] IN BLOOD BY AUTOMATED COUNT: 14.4 % (ref 11.5–14.5)
EST. GFR  (NO RACE VARIABLE): 24 ML/MIN/1.73 M^2
GLUCOSE SERPL-MCNC: 86 MG/DL (ref 70–110)
HCT VFR BLD AUTO: 22.8 % (ref 37–48.5)
HCT VFR BLD CALC: 23 %PCV (ref 36–54)
HGB BLD-MCNC: 7.6 G/DL (ref 12–16)
HGB BLD-MCNC: 8 G/DL
IMM GRANULOCYTES # BLD AUTO: 0.06 K/UL (ref 0–0.04)
IMM GRANULOCYTES NFR BLD AUTO: 0.8 % (ref 0–0.5)
LACTATE SERPL-SCNC: 0.9 MMOL/L (ref 0.5–2.2)
LACTATE SERPL-SCNC: 3.8 MMOL/L (ref 0.5–2.2)
LDH SERPL L TO P-CCNC: 3.69 MMOL/L (ref 0.5–2.2)
LYMPHOCYTES # BLD AUTO: 0.5 K/UL (ref 1–4.8)
LYMPHOCYTES NFR BLD: 7.1 % (ref 18–48)
MAGNESIUM SERPL-MCNC: 1 MG/DL (ref 1.6–2.6)
MCH RBC QN AUTO: 32.3 PG (ref 27–31)
MCHC RBC AUTO-ENTMCNC: 33.3 G/DL (ref 32–36)
MCV RBC AUTO: 97 FL (ref 82–98)
MONOCYTES # BLD AUTO: 1.7 K/UL (ref 0.3–1)
MONOCYTES NFR BLD: 21.7 % (ref 4–15)
NEUTROPHILS # BLD AUTO: 5.4 K/UL (ref 1.8–7.7)
NEUTROPHILS NFR BLD: 70 % (ref 38–73)
NRBC BLD-RTO: 0 /100 WBC
NUM UNITS TRANS PACKED RBC: NORMAL
NUM UNITS TRANS PACKED RBC: NORMAL
PHOSPHATE SERPL-MCNC: 3.5 MG/DL (ref 2.7–4.5)
PLATELET # BLD AUTO: 138 K/UL (ref 150–450)
PMV BLD AUTO: 10.3 FL (ref 9.2–12.9)
POC IONIZED CALCIUM: 1.04 MMOL/L (ref 1.06–1.42)
POC MOLECULAR INFLUENZA A AGN: NEGATIVE
POC MOLECULAR INFLUENZA B AGN: NEGATIVE
POTASSIUM BLD-SCNC: 4.1 MMOL/L (ref 3.5–5.1)
POTASSIUM SERPL-SCNC: 3.7 MMOL/L (ref 3.5–5.1)
PROCALCITONIN SERPL IA-MCNC: 0.46 NG/ML
PROT SERPL-MCNC: 5.2 G/DL (ref 6–8.4)
RBC # BLD AUTO: 2.35 M/UL (ref 4–5.4)
SAMPLE: ABNORMAL
SAMPLE: ABNORMAL
SARS-COV-2 RDRP RESP QL NAA+PROBE: NEGATIVE
SODIUM BLD-SCNC: 130 MMOL/L (ref 136–145)
SODIUM SERPL-SCNC: 129 MMOL/L (ref 136–145)
WBC # BLD AUTO: 7.65 K/UL (ref 3.9–12.7)

## 2022-12-23 PROCEDURE — 93010 EKG 12-LEAD: ICD-10-PCS | Mod: ,,, | Performed by: INTERNAL MEDICINE

## 2022-12-23 PROCEDURE — 99999 PR PBB SHADOW E&M-EST. PATIENT-LVL IV: CPT | Mod: PBBFAC,,, | Performed by: STUDENT IN AN ORGANIZED HEALTH CARE EDUCATION/TRAINING PROGRAM

## 2022-12-23 PROCEDURE — 1125F AMNT PAIN NOTED PAIN PRSNT: CPT | Mod: CPTII,S$GLB,, | Performed by: STUDENT IN AN ORGANIZED HEALTH CARE EDUCATION/TRAINING PROGRAM

## 2022-12-23 PROCEDURE — A4216 STERILE WATER/SALINE, 10 ML: HCPCS | Performed by: INTERNAL MEDICINE

## 2022-12-23 PROCEDURE — 93010 ELECTROCARDIOGRAM REPORT: CPT | Mod: ,,, | Performed by: INTERNAL MEDICINE

## 2022-12-23 PROCEDURE — P9016 RBC LEUKOCYTES REDUCED: HCPCS

## 2022-12-23 PROCEDURE — 3078F PR MOST RECENT DIASTOLIC BLOOD PRESSURE < 80 MM HG: ICD-10-PCS | Mod: CPTII,S$GLB,, | Performed by: STUDENT IN AN ORGANIZED HEALTH CARE EDUCATION/TRAINING PROGRAM

## 2022-12-23 PROCEDURE — 99223 PR INITIAL HOSPITAL CARE,LEVL III: ICD-10-PCS | Mod: ,,, | Performed by: STUDENT IN AN ORGANIZED HEALTH CARE EDUCATION/TRAINING PROGRAM

## 2022-12-23 PROCEDURE — 86920 COMPATIBILITY TEST SPIN: CPT

## 2022-12-23 PROCEDURE — 87635 SARS-COV-2 COVID-19 AMP PRB: CPT | Performed by: PHYSICIAN ASSISTANT

## 2022-12-23 PROCEDURE — 3008F BODY MASS INDEX DOCD: CPT | Mod: CPTII,S$GLB,, | Performed by: STUDENT IN AN ORGANIZED HEALTH CARE EDUCATION/TRAINING PROGRAM

## 2022-12-23 PROCEDURE — 3074F SYST BP LT 130 MM HG: CPT | Mod: CPTII,S$GLB,, | Performed by: STUDENT IN AN ORGANIZED HEALTH CARE EDUCATION/TRAINING PROGRAM

## 2022-12-23 PROCEDURE — 84100 ASSAY OF PHOSPHORUS: CPT | Performed by: PHYSICIAN ASSISTANT

## 2022-12-23 PROCEDURE — 4010F ACE/ARB THERAPY RXD/TAKEN: CPT | Mod: CPTII,S$GLB,, | Performed by: STUDENT IN AN ORGANIZED HEALTH CARE EDUCATION/TRAINING PROGRAM

## 2022-12-23 PROCEDURE — 1160F RVW MEDS BY RX/DR IN RCRD: CPT | Mod: CPTII,S$GLB,, | Performed by: STUDENT IN AN ORGANIZED HEALTH CARE EDUCATION/TRAINING PROGRAM

## 2022-12-23 PROCEDURE — 3008F PR BODY MASS INDEX (BMI) DOCUMENTED: ICD-10-PCS | Mod: CPTII,S$GLB,, | Performed by: STUDENT IN AN ORGANIZED HEALTH CARE EDUCATION/TRAINING PROGRAM

## 2022-12-23 PROCEDURE — C1751 CATH, INF, PER/CENT/MIDLINE: HCPCS

## 2022-12-23 PROCEDURE — 3044F HG A1C LEVEL LT 7.0%: CPT | Mod: CPTII,S$GLB,, | Performed by: STUDENT IN AN ORGANIZED HEALTH CARE EDUCATION/TRAINING PROGRAM

## 2022-12-23 PROCEDURE — 99999 PR PBB SHADOW E&M-EST. PATIENT-LVL IV: ICD-10-PCS | Mod: PBBFAC,,, | Performed by: STUDENT IN AN ORGANIZED HEALTH CARE EDUCATION/TRAINING PROGRAM

## 2022-12-23 PROCEDURE — 1101F PT FALLS ASSESS-DOCD LE1/YR: CPT | Mod: CPTII,S$GLB,, | Performed by: STUDENT IN AN ORGANIZED HEALTH CARE EDUCATION/TRAINING PROGRAM

## 2022-12-23 PROCEDURE — 1160F PR REVIEW ALL MEDS BY PRESCRIBER/CLIN PHARMACIST DOCUMENTED: ICD-10-PCS | Mod: CPTII,S$GLB,, | Performed by: STUDENT IN AN ORGANIZED HEALTH CARE EDUCATION/TRAINING PROGRAM

## 2022-12-23 PROCEDURE — 3288F PR FALLS RISK ASSESSMENT DOCUMENTED: ICD-10-PCS | Mod: CPTII,S$GLB,, | Performed by: STUDENT IN AN ORGANIZED HEALTH CARE EDUCATION/TRAINING PROGRAM

## 2022-12-23 PROCEDURE — 84145 PROCALCITONIN (PCT): CPT | Performed by: PHYSICIAN ASSISTANT

## 2022-12-23 PROCEDURE — 84295 ASSAY OF SERUM SODIUM: CPT

## 2022-12-23 PROCEDURE — 36415 COLL VENOUS BLD VENIPUNCTURE: CPT | Performed by: PHYSICIAN ASSISTANT

## 2022-12-23 PROCEDURE — 96365 THER/PROPH/DIAG IV INF INIT: CPT | Mod: 59

## 2022-12-23 PROCEDURE — 99215 PR OFFICE/OUTPT VISIT, EST, LEVL V, 40-54 MIN: ICD-10-PCS | Mod: S$GLB,,, | Performed by: STUDENT IN AN ORGANIZED HEALTH CARE EDUCATION/TRAINING PROGRAM

## 2022-12-23 PROCEDURE — 87040 BLOOD CULTURE FOR BACTERIA: CPT | Mod: 59 | Performed by: PHYSICIAN ASSISTANT

## 2022-12-23 PROCEDURE — 63600175 PHARM REV CODE 636 W HCPCS: Performed by: INTERNAL MEDICINE

## 2022-12-23 PROCEDURE — 99291 PR CRITICAL CARE, E/M 30-74 MINUTES: ICD-10-PCS | Mod: ,,, | Performed by: INTERNAL MEDICINE

## 2022-12-23 PROCEDURE — 99223 1ST HOSP IP/OBS HIGH 75: CPT | Mod: ,,, | Performed by: INTERNAL MEDICINE

## 2022-12-23 PROCEDURE — 1159F MED LIST DOCD IN RCRD: CPT | Mod: CPTII,S$GLB,, | Performed by: STUDENT IN AN ORGANIZED HEALTH CARE EDUCATION/TRAINING PROGRAM

## 2022-12-23 PROCEDURE — 4010F PR ACE/ARB THEARPY RXD/TAKEN: ICD-10-PCS | Mod: CPTII,S$GLB,, | Performed by: STUDENT IN AN ORGANIZED HEALTH CARE EDUCATION/TRAINING PROGRAM

## 2022-12-23 PROCEDURE — 1101F PR PT FALLS ASSESS DOC 0-1 FALLS W/OUT INJ PAST YR: ICD-10-PCS | Mod: CPTII,S$GLB,, | Performed by: STUDENT IN AN ORGANIZED HEALTH CARE EDUCATION/TRAINING PROGRAM

## 2022-12-23 PROCEDURE — 83605 ASSAY OF LACTIC ACID: CPT | Mod: 91 | Performed by: PHYSICIAN ASSISTANT

## 2022-12-23 PROCEDURE — 63600175 PHARM REV CODE 636 W HCPCS: Performed by: PHYSICIAN ASSISTANT

## 2022-12-23 PROCEDURE — 85025 COMPLETE CBC W/AUTO DIFF WBC: CPT | Performed by: PHYSICIAN ASSISTANT

## 2022-12-23 PROCEDURE — 3288F FALL RISK ASSESSMENT DOCD: CPT | Mod: CPTII,S$GLB,, | Performed by: STUDENT IN AN ORGANIZED HEALTH CARE EDUCATION/TRAINING PROGRAM

## 2022-12-23 PROCEDURE — 3078F DIAST BP <80 MM HG: CPT | Mod: CPTII,S$GLB,, | Performed by: STUDENT IN AN ORGANIZED HEALTH CARE EDUCATION/TRAINING PROGRAM

## 2022-12-23 PROCEDURE — 84132 ASSAY OF SERUM POTASSIUM: CPT

## 2022-12-23 PROCEDURE — 25000003 PHARM REV CODE 250: Performed by: PHYSICIAN ASSISTANT

## 2022-12-23 PROCEDURE — 27000207 HC ISOLATION

## 2022-12-23 PROCEDURE — 93005 ELECTROCARDIOGRAM TRACING: CPT

## 2022-12-23 PROCEDURE — 27000221 HC OXYGEN, UP TO 24 HOURS

## 2022-12-23 PROCEDURE — P9016 RBC LEUKOCYTES REDUCED: HCPCS | Performed by: PHYSICIAN ASSISTANT

## 2022-12-23 PROCEDURE — 82330 ASSAY OF CALCIUM: CPT

## 2022-12-23 PROCEDURE — 86900 BLOOD TYPING SEROLOGIC ABO: CPT | Performed by: PHYSICIAN ASSISTANT

## 2022-12-23 PROCEDURE — 99291 CRITICAL CARE FIRST HOUR: CPT | Mod: 25

## 2022-12-23 PROCEDURE — 3044F PR MOST RECENT HEMOGLOBIN A1C LEVEL <7.0%: ICD-10-PCS | Mod: CPTII,S$GLB,, | Performed by: STUDENT IN AN ORGANIZED HEALTH CARE EDUCATION/TRAINING PROGRAM

## 2022-12-23 PROCEDURE — 1159F PR MEDICATION LIST DOCUMENTED IN MEDICAL RECORD: ICD-10-PCS | Mod: CPTII,S$GLB,, | Performed by: STUDENT IN AN ORGANIZED HEALTH CARE EDUCATION/TRAINING PROGRAM

## 2022-12-23 PROCEDURE — 85014 HEMATOCRIT: CPT

## 2022-12-23 PROCEDURE — 99291 CRITICAL CARE FIRST HOUR: CPT | Mod: ,,, | Performed by: INTERNAL MEDICINE

## 2022-12-23 PROCEDURE — 96361 HYDRATE IV INFUSION ADD-ON: CPT

## 2022-12-23 PROCEDURE — 20000000 HC ICU ROOM

## 2022-12-23 PROCEDURE — 99223 PR INITIAL HOSPITAL CARE,LEVL III: ICD-10-PCS | Mod: ,,, | Performed by: INTERNAL MEDICINE

## 2022-12-23 PROCEDURE — 83735 ASSAY OF MAGNESIUM: CPT | Mod: 91 | Performed by: PHYSICIAN ASSISTANT

## 2022-12-23 PROCEDURE — 36569 INSJ PICC 5 YR+ W/O IMAGING: CPT

## 2022-12-23 PROCEDURE — 83880 ASSAY OF NATRIURETIC PEPTIDE: CPT | Performed by: PHYSICIAN ASSISTANT

## 2022-12-23 PROCEDURE — 1125F PR PAIN SEVERITY QUANTIFIED, PAIN PRESENT: ICD-10-PCS | Mod: CPTII,S$GLB,, | Performed by: STUDENT IN AN ORGANIZED HEALTH CARE EDUCATION/TRAINING PROGRAM

## 2022-12-23 PROCEDURE — 80053 COMPREHEN METABOLIC PANEL: CPT | Mod: 91 | Performed by: PHYSICIAN ASSISTANT

## 2022-12-23 PROCEDURE — 99223 1ST HOSP IP/OBS HIGH 75: CPT | Mod: ,,, | Performed by: STUDENT IN AN ORGANIZED HEALTH CARE EDUCATION/TRAINING PROGRAM

## 2022-12-23 PROCEDURE — 94761 N-INVAS EAR/PLS OXIMETRY MLT: CPT

## 2022-12-23 PROCEDURE — 99900035 HC TECH TIME PER 15 MIN (STAT)

## 2022-12-23 PROCEDURE — 25000003 PHARM REV CODE 250: Performed by: INTERNAL MEDICINE

## 2022-12-23 PROCEDURE — 86920 COMPATIBILITY TEST SPIN: CPT | Performed by: PHYSICIAN ASSISTANT

## 2022-12-23 PROCEDURE — 99215 OFFICE O/P EST HI 40 MIN: CPT | Mod: S$GLB,,, | Performed by: STUDENT IN AN ORGANIZED HEALTH CARE EDUCATION/TRAINING PROGRAM

## 2022-12-23 PROCEDURE — 36430 TRANSFUSION BLD/BLD COMPNT: CPT

## 2022-12-23 PROCEDURE — 3074F PR MOST RECENT SYSTOLIC BLOOD PRESSURE < 130 MM HG: ICD-10-PCS | Mod: CPTII,S$GLB,, | Performed by: STUDENT IN AN ORGANIZED HEALTH CARE EDUCATION/TRAINING PROGRAM

## 2022-12-23 PROCEDURE — 83605 ASSAY OF LACTIC ACID: CPT

## 2022-12-23 RX ORDER — DULOXETIN HYDROCHLORIDE 30 MG/1
60 CAPSULE, DELAYED RELEASE ORAL DAILY
Status: DISCONTINUED | OUTPATIENT
Start: 2022-12-24 | End: 2022-12-27 | Stop reason: HOSPADM

## 2022-12-23 RX ORDER — PANTOPRAZOLE SODIUM 40 MG/1
40 TABLET, DELAYED RELEASE ORAL DAILY
Status: DISCONTINUED | OUTPATIENT
Start: 2022-12-24 | End: 2022-12-27 | Stop reason: HOSPADM

## 2022-12-23 RX ORDER — FLUTICASONE FUROATE AND VILANTEROL 100; 25 UG/1; UG/1
1 POWDER RESPIRATORY (INHALATION) DAILY
Status: DISCONTINUED | OUTPATIENT
Start: 2022-12-24 | End: 2022-12-27 | Stop reason: HOSPADM

## 2022-12-23 RX ORDER — ATORVASTATIN CALCIUM 20 MG/1
40 TABLET, FILM COATED ORAL DAILY
Status: DISCONTINUED | OUTPATIENT
Start: 2022-12-24 | End: 2022-12-27 | Stop reason: HOSPADM

## 2022-12-23 RX ORDER — GABAPENTIN 100 MG/1
200 CAPSULE ORAL 3 TIMES DAILY
Status: DISCONTINUED | OUTPATIENT
Start: 2022-12-23 | End: 2022-12-27 | Stop reason: HOSPADM

## 2022-12-23 RX ORDER — AZELASTINE 1 MG/ML
1 SPRAY, METERED NASAL 2 TIMES DAILY
Status: DISCONTINUED | OUTPATIENT
Start: 2022-12-23 | End: 2022-12-27 | Stop reason: HOSPADM

## 2022-12-23 RX ORDER — SODIUM CHLORIDE 9 MG/ML
1000 INJECTION, SOLUTION INTRAVENOUS
Status: DISCONTINUED | OUTPATIENT
Start: 2022-12-23 | End: 2022-12-23

## 2022-12-23 RX ORDER — SODIUM CHLORIDE 0.9 % (FLUSH) 0.9 %
10 SYRINGE (ML) INJECTION
Status: DISCONTINUED | OUTPATIENT
Start: 2022-12-23 | End: 2022-12-25

## 2022-12-23 RX ORDER — HYDROCODONE BITARTRATE AND ACETAMINOPHEN 500; 5 MG/1; MG/1
TABLET ORAL
Status: DISCONTINUED | OUTPATIENT
Start: 2022-12-23 | End: 2022-12-25

## 2022-12-23 RX ORDER — MUPIROCIN 20 MG/G
OINTMENT TOPICAL 2 TIMES DAILY
Status: DISCONTINUED | OUTPATIENT
Start: 2022-12-23 | End: 2022-12-27 | Stop reason: HOSPADM

## 2022-12-23 RX ORDER — ALPRAZOLAM 0.25 MG/1
0.25 TABLET ORAL DAILY PRN
Status: DISCONTINUED | OUTPATIENT
Start: 2022-12-23 | End: 2022-12-27 | Stop reason: HOSPADM

## 2022-12-23 RX ORDER — IPRATROPIUM BROMIDE AND ALBUTEROL SULFATE 2.5; .5 MG/3ML; MG/3ML
3 SOLUTION RESPIRATORY (INHALATION) EVERY 4 HOURS PRN
Status: DISCONTINUED | OUTPATIENT
Start: 2022-12-23 | End: 2022-12-27 | Stop reason: HOSPADM

## 2022-12-23 RX ORDER — SODIUM CHLORIDE 0.9 % (FLUSH) 0.9 %
10 SYRINGE (ML) INJECTION
Status: DISCONTINUED | OUTPATIENT
Start: 2022-12-23 | End: 2022-12-27 | Stop reason: HOSPADM

## 2022-12-23 RX ORDER — MAGNESIUM SULFATE HEPTAHYDRATE 40 MG/ML
2 INJECTION, SOLUTION INTRAVENOUS ONCE
Status: COMPLETED | OUTPATIENT
Start: 2022-12-23 | End: 2022-12-23

## 2022-12-23 RX ORDER — ACETAMINOPHEN 325 MG/1
650 TABLET ORAL EVERY 6 HOURS PRN
Status: DISCONTINUED | OUTPATIENT
Start: 2022-12-23 | End: 2022-12-27 | Stop reason: HOSPADM

## 2022-12-23 RX ORDER — TRAZODONE HYDROCHLORIDE 100 MG/1
100 TABLET ORAL NIGHTLY
Status: DISCONTINUED | OUTPATIENT
Start: 2022-12-23 | End: 2022-12-27 | Stop reason: HOSPADM

## 2022-12-23 RX ORDER — SODIUM CHLORIDE 0.9 % (FLUSH) 0.9 %
10 SYRINGE (ML) INJECTION EVERY 6 HOURS
Status: DISCONTINUED | OUTPATIENT
Start: 2022-12-23 | End: 2022-12-27 | Stop reason: HOSPADM

## 2022-12-23 RX ORDER — ONDANSETRON 4 MG/1
4 TABLET, ORALLY DISINTEGRATING ORAL EVERY 6 HOURS PRN
Status: DISCONTINUED | OUTPATIENT
Start: 2022-12-23 | End: 2022-12-27 | Stop reason: HOSPADM

## 2022-12-23 RX ORDER — ONDANSETRON 2 MG/ML
4 INJECTION INTRAMUSCULAR; INTRAVENOUS EVERY 6 HOURS PRN
Status: DISCONTINUED | OUTPATIENT
Start: 2022-12-23 | End: 2022-12-27 | Stop reason: HOSPADM

## 2022-12-23 RX ORDER — HYDROCODONE BITARTRATE AND ACETAMINOPHEN 10; 325 MG/1; MG/1
1 TABLET ORAL EVERY 12 HOURS PRN
Status: DISCONTINUED | OUTPATIENT
Start: 2022-12-23 | End: 2022-12-25

## 2022-12-23 RX ORDER — NOREPINEPHRINE BITARTRATE/D5W 4MG/250ML
0-3 PLASTIC BAG, INJECTION (ML) INTRAVENOUS CONTINUOUS
Status: DISCONTINUED | OUTPATIENT
Start: 2022-12-23 | End: 2022-12-27

## 2022-12-23 RX ADMIN — AZELASTINE HYDROCHLORIDE 137 MCG: 137 SPRAY, METERED NASAL at 09:12

## 2022-12-23 RX ADMIN — Medication 0.05 MCG/KG/MIN: at 11:12

## 2022-12-23 RX ADMIN — SODIUM CHLORIDE 250 ML: 0.9 INJECTION, SOLUTION INTRAVENOUS at 10:12

## 2022-12-23 RX ADMIN — HYDROCODONE BITARTRATE AND ACETAMINOPHEN 1 TABLET: 10; 325 TABLET ORAL at 09:12

## 2022-12-23 RX ADMIN — PIPERACILLIN AND TAZOBACTAM 4.5 G: 4; .5 INJECTION, POWDER, LYOPHILIZED, FOR SOLUTION INTRAVENOUS; PARENTERAL at 11:12

## 2022-12-23 RX ADMIN — SODIUM CHLORIDE 1000 ML: 0.9 INJECTION, SOLUTION INTRAVENOUS at 10:12

## 2022-12-23 RX ADMIN — SODIUM CHLORIDE 1000 ML: 0.9 INJECTION, SOLUTION INTRAVENOUS at 03:12

## 2022-12-23 RX ADMIN — MAGNESIUM SULFATE HEPTAHYDRATE 2 G: 40 INJECTION, SOLUTION INTRAVENOUS at 06:12

## 2022-12-23 RX ADMIN — TRAZODONE HYDROCHLORIDE 100 MG: 50 TABLET ORAL at 09:12

## 2022-12-23 RX ADMIN — MUPIROCIN: 20 OINTMENT TOPICAL at 09:12

## 2022-12-23 RX ADMIN — PIPERACILLIN AND TAZOBACTAM 4.5 G: 4; .5 INJECTION, POWDER, LYOPHILIZED, FOR SOLUTION INTRAVENOUS; PARENTERAL at 07:12

## 2022-12-23 RX ADMIN — SODIUM CHLORIDE, PRESERVATIVE FREE 10 ML: 5 INJECTION INTRAVENOUS at 11:12

## 2022-12-23 RX ADMIN — GABAPENTIN 200 MG: 100 CAPSULE ORAL at 09:12

## 2022-12-23 RX ADMIN — SODIUM CHLORIDE, PRESERVATIVE FREE 10 ML: 5 INJECTION INTRAVENOUS at 07:12

## 2022-12-23 NOTE — SUBJECTIVE & OBJECTIVE
Past Medical History:   Diagnosis Date    Anxiety     Cervical spinal stenosis     Choledocholithiasis     Chronic obstructive pulmonary disease 03/16/2016    Degenerative disc disease of cervical spine     Diverticulosis 04/24/2018    History of alcohol abuse 03/02/2021    History of gastric ulcer     History of L3 compression fracture 12/19/2018    History of lung cancer     s/p completion of XRT in 10/2020    Hyperlipidemia     Iron deficiency anemia     Osteoarthritis     Stage III CKD 2017       Past Surgical History:   Procedure Laterality Date    BREAST CYST EXCISION Right     1967    CATARACT EXTRACTION      COLONOSCOPY  2015    COLONOSCOPY N/A 6/8/2022    Procedure: COLONOSCOPY;  Surgeon: Helio Cheema MD;  Location: [facility] ([address] [place] [address]);  Service: Endoscopy;  Laterality: N/A;  5/25-Pt requesting Dr. Cheema-approval given per Dr. Cheema-ml  Fully vaccinated, prep instr portal -ml    CORONARY ANGIOGRAPHY N/A 7/20/2018    Procedure: ANGIOGRAM, CORONARY ARTERY;  Surgeon: Willard Roberts MD;  Location: [facility] CATH LAB;  Service: Cardiovascular;  Laterality: N/A;    ENDOSCOPIC ULTRASOUND OF UPPER GASTROINTESTINAL TRACT N/A 3/24/2021    Procedure: ULTRASOUND, UPPER GI TRACT, ENDOSCOPIC;  Surgeon: Helio Cheema MD;  Location: [address]);  Service: Endoscopy;  Laterality: N/A;    ENDOSCOPIC ULTRASOUND OF UPPER GASTROINTESTINAL TRACT N/A 4/25/2022    Procedure: ULTRASOUND, UPPER GI TRACT, ENDOSCOPIC;  Surgeon: Helio Cheema MD;  [address]  Service: Endoscopy;  Laterality: N/A;  cardiac risk assessment 1 per Dr. Ortez. see t/e 3/17-SC  3/25:new instructions via portal. home with medical transport?-SC    ERCP N/A 3/24/2021    Procedure: ERCP (ENDOSCOPIC RETROGRADE CHOLANGIOPANCREATOGRAPHY);  Surgeon: Helio Cheema MD;  [facility]  Service: Endoscopy;  Laterality: N/A;    ERCP N/A 4/30/2021    Procedure: ERCP (ENDOSCOPIC RETROGRADE  CHOLANGIOPANCREATOGRAPHY);  Surgeon: Boo Gray MD;  Location: [facility] ENDO (2ND FLR);  Service: Endoscopy;  Laterality: N/A;    ERCP N/A 7/1/2021    Procedure: ERCP (ENDOSCOPIC RETROGRADE CHOLANGIOPANCREATOGRAPHY);  Surgeon: Helio Cheema MD;  Location: [facility] ENDO (2ND FLR);  Service: Endoscopy;  Laterality: N/A;  Ok for Taxi. Dr Cheema  rapid covid [id number]- tb inst email    ESOPHAGOGASTRODUODENOSCOPY  2015    ESOPHAGOGASTRODUODENOSCOPY N/A 3/4/2021    Procedure: EGD (ESOPHAGOGASTRODUODENOSCOPY);  Surgeon: Yenifer Ramirez MD;  Location: [facility];  Service: Endoscopy;  Laterality: N/A;    ESOPHAGOGASTRODUODENOSCOPY N/A 3/24/2021    Procedure: EGD (ESOPHAGOGASTRODUODENOSCOPY);  Surgeon: Helio Cheema MD;  Location: [facility] (2ND FLR);  Service: Endoscopy;  Laterality: N/A;    EYE SURGERY  2016    Cataracts    FRACTURE SURGERY  2014    JOINT REPLACEMENT  2007    ORIF FEMUR FRACTURE Left     TOTAL KNEE ARTHROPLASTY Left 2007    TUBAL LIGATION         Review of patient's allergies indicates:   Allergen Reactions    Wellbutrin [bupropion hcl] Other (See Comments)     Hyponatremia and hypomagnesia     Zoloft [sertraline] Other (See Comments)     Hyponatremia and hypomagnesia     Bananas [banana]      Emesis, and stomach cramps    Patient states she is not allergic to Banana       No current facility-administered medications on file prior to encounter.     Current Outpatient Medications on File Prior to Encounter   Medication Sig    ALPRAZolam (XANAX) 0.25 MG tablet Take 1 tablet (0.25 mg total) by mouth daily as needed for Anxiety.    atorvastatin (LIPITOR) 40 MG tablet TAKE 1 TABLET BY MOUTH EVERY DAILY    azelastine (ASTELIN) 137 mcg (0.1 %) nasal spray Instill 1 spray (137 mcg total) by Nasal route 2 (two) times daily.    DULoxetine (CYMBALTA) 60 MG capsule Take 1 capsule (60 mg total) by mouth once daily.    fluticasone propion-salmeterol 115-21 mcg/dose (ADVAIR HFA) 115-21 mcg/actuation HFAA inhaler  Inhale 2 puffs into the lungs every 12 (twelve) hours.    gabapentin (NEURONTIN) 300 MG capsule Take 1 capsule (300 mg total) by mouth 3 (three) times daily.    HYDROcodone-acetaminophen (NORCO)  mg per tablet Take 1 tablet by mouth every 12 (twelve) hours as needed for Pain.    losartan (COZAAR) 100 MG tablet Take 1 tablet (100 mg total) by mouth once daily.    metoprolol succinate (TOPROL-XL) 25 MG 24 hr tablet Take 2 tablets (50 mg total) by mouth once daily.    pantoprazole (PROTONIX) 40 MG tablet Take 1 tablet (40 mg total) by mouth once daily.    promethazine (PHENERGAN) 25 MG tablet Take 1 tablet (25 mg total) by mouth every 6 (six) hours as needed for Nausea.    torsemide (DEMADEX) 20 MG Tab Take 1 tablet (20 mg total) by mouth once daily.    traZODone (DESYREL) 100 MG tablet Take 1 tablet (100 mg total) by mouth every evening.    umeclidinium (INCRUSE ELLIPTA) 62.5 mcg/actuation inhalation capsule Inhale 1 puff into the lungs once daily. Controller    albuterol (VENTOLIN HFA) 90 mcg/actuation inhaler inhale 1-2 puffs as needed every 6 hours for wheezing and shortness of breath    ascorbic acid, vitamin C, (VITAMIN C) 250 MG tablet Take 250 mg by mouth once daily.    b complex vitamins capsule Take 1 capsule by mouth once daily.    biotin 10 mg Tab Take 10 mg by mouth once daily.    calcium carbonate (OS-SANDRINE) 500 mg calcium (1,250 mg) tablet Take 2 tablets (1,000 mg total) by mouth 2 (two) times daily.    cyanocobalamin, vitamin B-12, 1,000 mcg TbSR Take 1,000 mcg by mouth once daily. (Patient not taking: Reported on 12/23/2022)    denosumab (PROLIA) 60 mg/mL Syrg Inject 1 mL (60 mg total) into the skin every 6 (six) months.    fluticasone (FLONASE) 50 mcg/actuation nasal spray 1 spray by Each Nare route 2 (two) times daily as needed for Rhinitis.    guaiFENesin (MUCINEX) 600 mg 12 hr tablet Take 1,200 mg by mouth 2 (two) times daily as needed for Congestion.    lutein 40 mg Cap Take by mouth.     magnesium oxide (MAG-OX) 400 mg (241.3 mg magnesium) tablet Take 1 tablet by mouth every 12 (twelve) hours. (Patient not taking: Reported on 12/23/2022)    multivit-min/iron/folic/lutein (CENTRUM SILVER WOMEN ORAL) Take 1 tablet by mouth once daily.    ondansetron (ZOFRAN-ODT) 8 MG TbDL dissolve 1 tablet (8 mg total) by mouth every 8 (eight) hours as needed (nausea). (Patient not taking: Reported on 12/23/2022)    vitamin D (VITAMIN D3) 1000 units Tab Take 1 tablet (1,000 Units total) by mouth once daily. (Patient not taking: Reported on 12/23/2022)    ZINC ORAL Take by mouth.     Family History       Problem Relation (Age of Onset)    Alzheimer's disease Mother    Arthritis Father, Brother    Cancer Sister    Colon cancer Brother    Dementia Mother    Depression Mother    Hypertension Mother, Brother    Miscarriages / Stillbirths Sister    Myasthenia gravis Father    No Known Problems Son    Rectal cancer Father          Tobacco Use    Smoking status: Some Days     Packs/day: 1.00     Years: 53.00     Pack years: 53.00     Types: Cigarettes     Start date: 4/30/1967    Smokeless tobacco: Never    Tobacco comments:     I had quit for about 6 months this past year. Then massive stress and started back up sometimes   Substance and Sexual Activity    Alcohol use: Yes     Alcohol/week: 7.0 standard drinks     Types: 7 Drinks containing 0.5 oz of alcohol per week     Comment: at least 1 cocktail a day    Drug use: No    Sexual activity: Not Currently     Partners: Male     Birth control/protection: Abstinence, Post-menopausal     Review of Systems   Constitutional:  Positive for fatigue. Negative for chills and fever.   HENT:  Negative for sore throat and trouble swallowing.    Eyes:  Negative for pain and visual disturbance.   Respiratory:  Negative for cough and shortness of breath.    Cardiovascular:  Negative for chest pain and palpitations.   Gastrointestinal:  Positive for blood in stool and diarrhea. Negative for  abdominal pain, nausea and vomiting.   Genitourinary:  Negative for difficulty urinating and dysuria.   Musculoskeletal:  Positive for arthralgias and myalgias.   Skin:  Negative for rash and wound.   Neurological:  Negative for weakness and numbness.   Objective:     Vital Signs (Most Recent):  Temp: 98.1 °F (36.7 °C) (12/23/22 1445)  Pulse: 80 (12/23/22 1645)  Resp: 20 (12/23/22 1645)  BP: 129/61 (12/23/22 1645)  SpO2: 100 % (12/23/22 1645) Vital Signs (24h Range):  Temp:  [98.1 °F (36.7 °C)-98.9 °F (37.2 °C)] 98.1 °F (36.7 °C)  Pulse:  [79-93] 80  Resp:  [12-37] 20  SpO2:  [88 %-100 %] 100 %  BP: ()/(28-87) 129/61     Weight: 95.8 kg (211 lb 3.2 oz)  Body mass index is 34.09 kg/m².    Physical Exam  Vitals and nursing note reviewed.   Constitutional:       General: She is not in acute distress.     Appearance: She is well-developed.   HENT:      Head: Normocephalic and atraumatic.      Mouth/Throat:      Mouth: Mucous membranes are dry.   Eyes:      General:         Right eye: No discharge.         Left eye: No discharge.      Conjunctiva/sclera: Conjunctivae normal.   Cardiovascular:      Rate and Rhythm: Normal rate.      Pulses: Normal pulses.   Pulmonary:      Effort: Pulmonary effort is normal. No respiratory distress.   Abdominal:      Palpations: Abdomen is soft.      Tenderness: There is abdominal tenderness (mild LLQ).   Musculoskeletal:         General: Normal range of motion.      Right lower leg: Edema present.      Left lower leg: Edema present.   Skin:     General: Skin is warm and dry.      Comments: R 1st toe medial small ulcer.   Neurological:      Mental Status: She is alert and oriented to person, place, and time.      Significant Labs:   CBC:  Recent Labs   Lab 12/23/22  0744 12/23/22  1015 12/23/22  1028   WBC 8.54 7.65  --    HGB 8.1* 7.6*  --    HCT 24.7* 22.8* 23*   * 138*  --    GRAN 6.2 70.0  5.4  --    LYMPH  --  7.1*  0.5*  --    MONO  --  21.7*  1.7*  --    EOS  --   0.0  --    BASO  --  0.02  --    CMP:  Recent Labs   Lab 12/23/22  0744 12/23/22  1015   * 129*   K 4.6 3.7   CL 97 98   CO2 18* 20*   BUN 39* 39*   CREATININE 2.2* 2.1*   GLU 86 86   CALCIUM 7.7* 6.9*   MG 1.0* 1.0*   PHOS  --  3.5   ALKPHOS 93 87   AST 31 27   ALT 24 23   BILITOT 0.4 0.4   PROT 5.7* 5.2*   ALBUMIN 2.4* 2.2*   ANIONGAP 13 11      Significant Imaging:   Imaging Results               CT Abdomen Pelvis  Without Contrast (Final result)  Result time 12/23/22 13:23:59      Final result by Harry Gamez MD (12/23/22 13:23:59)                   Impression:      Air/fluid collection in the pelvis which is inseparable from the sigmoid colon and adjacent uterus.  Unclear if this finding represents air and fluid within a large diverticulum or true perisigmoid abscess.  Suggest correlation for any symptoms of sigmoid diverticulitis or colitis.  Follow-up CT with IV and/or enteric contrast may provide improved sensitivity when clinically warranted.    Liquid stool in the colon.  Mild rectal wall thickening.    Bilateral perinephric fat stranding and bilateral hyperdense renal lesions, similar to prior studies.    Additional findings discussed in the body of the report.    This report was flagged in Epic as abnormal.      Electronically signed by: Harry Gamez MD  Date:    12/23/2022  Time:    13:23               Narrative:    EXAMINATION:  CT ABDOMEN PELVIS WITHOUT CONTRAST    CLINICAL HISTORY:  LLQ abdominal pain;    TECHNIQUE:  Low dose axial images, sagittal and coronal reformations were obtained from the lung bases to the pubic symphysis.  Oral contrast was not administered.    COMPARISON:  PET-CT, 07/22/2022.  CT abdomen pelvis, 04/29/2021 and 03/22/2021.    FINDINGS:  Lower chest: Heart size is normal. Linear subsegmental atelectasis or scarring in the left lung base.  No pleural or pericardial effusion.    Abdomen:    Evaluation of the solid abdominal organs and bowel is limited in the  absence of IV contrast.    Liver is normal in size and contour without focal contour deforming lesion. Gallbladder is distended but otherwise unremarkable.  No calcified gallstones.  Common bile duct appears mildly distended though similar to priors.  No intra-or extrahepatic biliary ductal dilatation.    Spleen, adrenals, and pancreas are negative for acute finding.    Kidneys are symmetric.  Mild renal atrophy.  Bilateral perinephric fat stranding.  Multiple bilateral hyperdense renal lesions, the largest measuring 1.5 cm in the right kidney (axial image 72).  Largest hyperdense lesion in the left kidney measures approximately 1.4 cm in size (axial image 73).  No hydronephrosis.    No small bowel obstruction.  There is liquid stool throughout the colon.  Hyperdense fluid noted in the cecum and appendix.  Appendix is not distended.  Multiple colonic diverticula most pronounced in the descending and sigmoid colon.  There is a new large air/fluid collection adjacent to the sigmoid colon measuring approximately 6 x 5 cm in size (axial image 133).  Unclear if this finding represents fluid and air within a large diverticulum or true perisigmoid abscess, noting that evaluation of this finding is limited in the absence of IV and enteric contrast.  No gross pneumoperitoneum identified.  No additional organized fluid collection.  No portal venous gas or pneumatosis.    Abdominal aorta is normal in caliber with severe calcific atherosclerosis.    No abdominal lymphadenopathy.    Pelvis: Urinary bladder is unremarkable.  Uterus is inseparable from a large air/fluid collection in the pelvis as well as the adjacent sigmoid colon.  There is mild rectal wall thickening.  Trace pelvic free fluid.    Bones and soft tissues: No aggressive osseous lesions. Stable significant height loss of L3 vertebral body when compared back to multiple prior studies.  Multilevel degenerative changes in the spine.  Mild sclerosis involving the  bilateral femoral heads which may be related to early osteonecrosis.  Mild body wall edema.  Extraperitoneal soft tissues are negative for acute finding.                                       X-Ray Toe 2 or More Views Right (Final result)  Result time 12/23/22 10:43:48      Final result by Maya Presley MD (12/23/22 10:43:48)                   Impression:      Please see above.      Electronically signed by: Maya Presley  Date:    12/23/2022  Time:    10:43               Narrative:    EXAMINATION:  XR TOE 2 OR MORE VIEWS RIGHT    CLINICAL HISTORY:  toe pain;    TECHNIQUE:  Three views of the right toes were performed    COMPARISON:  09/21/2022    FINDINGS:  I see no acute fracture or dislocation.    Degenerative change 1st MTP joint. Second digit crosses over the first.                                       X-Ray Chest AP Portable (Final result)  Result time 12/23/22 10:42:39      Final result by Maya Presley MD (12/23/22 10:42:39)                   Impression:      Small left pleural effusion with adjacent atelectasis or consolidation.      Electronically signed by: Maya Presley  Date:    12/23/2022  Time:    10:42               Narrative:    EXAMINATION:  XR CHEST AP PORTABLE    CLINICAL HISTORY:  Sepsis;    TECHNIQUE:  Single frontal view of the chest was performed.    COMPARISON:  09/14/2022    FINDINGS:  Lungs are well expanded.  Small left pleural effusion with adjacent atelectasis or consolidation.  Stable opacity right upper lobe compared to recent chest CT.  Otherwise, right lung is fairly clear.    Cardiac silhouette is stable in size.  Calcified plaque lines arch.

## 2022-12-23 NOTE — PROCEDURES
"Nalini Varma is a 72 y.o. female patient.    Temp: 98.1 °F (36.7 °C) (12/23/22 1445)  Pulse: 80 (12/23/22 1645)  Resp: 20 (12/23/22 1645)  BP: 129/61 (12/23/22 1645)  SpO2: 100 % (12/23/22 1645)  Weight: 95.8 kg (211 lb 3.2 oz) (12/23/22 1445)  Height: 5' 6" (167.6 cm) (12/23/22 1445)    PICC  Date/Time: 12/23/2022 5:08 PM  Performed by: Igor Reid RN  Consent Done: Yes  Time out: Immediately prior to procedure a time out was called to verify the correct patient, procedure, equipment, support staff and site/side marked as required  Indications: med administration and vascular access  Anesthesia: local infiltration  Local anesthetic: lidocaine 1% without epinephrine  Anesthetic Total (mL): 5  Preparation: skin prepped with ChloraPrep  Skin prep agent dried: skin prep agent completely dried prior to procedure  Sterile barriers: all five maximum sterile barriers used - cap, mask, sterile gown, sterile gloves, and large sterile sheet  Hand hygiene: hand hygiene performed prior to central venous catheter insertion  Location details: right basilic  Catheter type: triple lumen  Catheter size: 5 Fr  Catheter Length: 41cm    Ultrasound guidance: yes  Vessel Caliber: small, compressibility normal  Needle advanced into vessel with real time Ultrasound guidance.  Guidewire confirmed in vessel.  Sterile sheath used.  Number of attempts: 2  Post-procedure: blood return through all ports, chlorhexidine patch and sterile dressing applied    Comments: Small brachial veins bilaterally, attempted left brachial vein, unable to gain access. Got access into left basilic, unable to thread picc past left shoulder.         Name igor reid  12/23/2022    "

## 2022-12-23 NOTE — PROGRESS NOTES
12/23/22 0843 12/23/22 0855 12/23/22 0909   Measurements   BP (!) 80/47 (!) 74/43 (!) 68/39     Notified Dr. Gilmore. She wants her to go to the ED. However, patient reports being symptomatic with dizziness, reports having diarrhea 2-3 x yesterday, and reports of having pain all over.  Called [facility], team arrived. Blood pressure continues to be low. Lea STAUFFER RN started IV to R hand with a 24 G and started IVFs. [facility] team here and took the patient to the ED.

## 2022-12-23 NOTE — ASSESSMENT & PLAN NOTE
- Likely secondary to dehydration from diarrhea and poor PO intake during illness   - Fluids and management per primary team

## 2022-12-23 NOTE — SUBJECTIVE & OBJECTIVE
Past Medical History:   Diagnosis Date    Anxiety     Cervical spinal stenosis     Choledocholithiasis     Chronic obstructive pulmonary disease 03/16/2016    Degenerative disc disease of cervical spine     Diverticulosis 04/24/2018    History of alcohol abuse 03/02/2021    History of gastric ulcer     History of L3 compression fracture 12/19/2018    History of lung cancer     s/p completion of XRT in 10/2020    Hyperlipidemia     Iron deficiency anemia     Osteoarthritis     Stage III CKD 2017       Past Surgical History:   Procedure Laterality Date    BREAST CYST EXCISION Right     1967    CATARACT EXTRACTION      COLONOSCOPY  2015    COLONOSCOPY N/A 6/8/2022    Procedure: COLONOSCOPY;  Surgeon: Helio Cheema MD;  Location: [facility] ([address] [place] [address]);  Service: Endoscopy;  Laterality: N/A;  5/25-Pt requesting Dr. Cheema-approval given per Dr. Cheema-ml  Fully vaccinated, prep instr portal -ml    CORONARY ANGIOGRAPHY N/A 7/20/2018    Procedure: ANGIOGRAM, CORONARY ARTERY;  Surgeon: Willard Roberts MD;  Location: [facility] CATH LAB;  Service: Cardiovascular;  Laterality: N/A;    ENDOSCOPIC ULTRASOUND OF UPPER GASTROINTESTINAL TRACT N/A 3/24/2021    Procedure: ULTRASOUND, UPPER GI TRACT, ENDOSCOPIC;  Surgeon: Helio Cheema MD;  Location: [address]);  Service: Endoscopy;  Laterality: N/A;    ENDOSCOPIC ULTRASOUND OF UPPER GASTROINTESTINAL TRACT N/A 4/25/2022    Procedure: ULTRASOUND, UPPER GI TRACT, ENDOSCOPIC;  Surgeon: Helio Cheema MD;  [address]  Service: Endoscopy;  Laterality: N/A;  cardiac risk assessment 1 per Dr. Ortez. see t/e 3/17-SC  3/25:new instructions via portal. home with medical transport?-SC    ERCP N/A 3/24/2021    Procedure: ERCP (ENDOSCOPIC RETROGRADE CHOLANGIOPANCREATOGRAPHY);  Surgeon: Helio Cheema MD;  [facility]  Service: Endoscopy;  Laterality: N/A;    ERCP N/A 4/30/2021    Procedure: ERCP (ENDOSCOPIC RETROGRADE  CHOLANGIOPANCREATOGRAPHY);  Surgeon: Boo Gray MD;  Location: [facility] ENDO (2ND FLR);  Service: Endoscopy;  Laterality: N/A;    ERCP N/A 7/1/2021    Procedure: ERCP (ENDOSCOPIC RETROGRADE CHOLANGIOPANCREATOGRAPHY);  Surgeon: Helio Cheema MD;  Location: [facility] ENDO (2ND FLR);  Service: Endoscopy;  Laterality: N/A;  Ok for Taxi. Dr Cheema  [facility] covid 1130am- tb inst email    ESOPHAGOGASTRODUODENOSCOPY  2015    ESOPHAGOGASTRODUODENOSCOPY N/A 3/4/2021    Procedure: EGD (ESOPHAGOGASTRODUODENOSCOPY);  Surgeon: Yenifer Ramirez MD;  Location: [facility];  Service: Endoscopy;  Laterality: N/A;    ESOPHAGOGASTRODUODENOSCOPY N/A 3/24/2021    Procedure: EGD (ESOPHAGOGASTRODUODENOSCOPY);  Surgeon: Helio Cheema MD;  Location: [facility] (2ND FLR);  Service: Endoscopy;  Laterality: N/A;    EYE SURGERY  2016    Cataracts    FRACTURE SURGERY  2014    JOINT REPLACEMENT  2007    ORIF FEMUR FRACTURE Left     TOTAL KNEE ARTHROPLASTY Left 2007    TUBAL LIGATION         Review of patient's allergies indicates:   Allergen Reactions    Wellbutrin [bupropion hcl] Other (See Comments)     Hyponatremia and hypomagnesia     Zoloft [sertraline] Other (See Comments)     Hyponatremia and hypomagnesia     Bananas [banana]      Emesis, and stomach cramps    Patient states she is not allergic to Banana       Medications:  Continuous Infusions:   NORepinephrine bitartrate-D5W 0.05 mcg/kg/min (12/23/22 1135)     Scheduled Meds:  PRN Meds:sodium chloride    Family History       Problem Relation (Age of Onset)    Alzheimer's disease Mother    Arthritis Father, Brother    Cancer Sister    Colon cancer Brother    Dementia Mother    Depression Mother    Hypertension Mother, Brother    Miscarriages / Stillbirths Sister    Myasthenia gravis Father    No Known Problems Son    Rectal cancer Father          Tobacco Use    Smoking status: Some Days     Packs/day: 1.00     Years: 53.00     Pack years: 53.00     Types: Cigarettes     Start date:  4/30/1967    Smokeless tobacco: Never    Tobacco comments:     I had quit for about 6 months this past year. Then massive stress and started back up sometimes   Substance and Sexual Activity    Alcohol use: Yes     Alcohol/week: 7.0 standard drinks     Types: 7 Drinks containing 0.5 oz of alcohol per week     Comment: at least 1 cocktail a day    Drug use: No    Sexual activity: Not Currently     Partners: Male     Birth control/protection: Abstinence, Post-menopausal       Review of Systems   Constitutional:  Positive for fatigue.   HENT:  Negative for congestion.    Eyes:  Negative for visual disturbance.   Respiratory:  Negative for shortness of breath.    Cardiovascular:  Negative for chest pain.   Gastrointestinal:  Positive for abdominal pain, diarrhea and nausea.   Genitourinary:  Positive for decreased urine volume.   Musculoskeletal:  Negative for back pain.   Skin:  Negative for wound.   Neurological:  Negative for syncope.   Psychiatric/Behavioral:  Negative for confusion.    Objective:     Vital Signs (Most Recent):  Temp: 98.2 °F (36.8 °C) (12/23/22 1148)  Pulse: 83 (12/23/22 1406)  Resp: (!) 24 (12/23/22 1406)  BP: (!) 101/54 (12/23/22 1406)  SpO2: 95 % (12/23/22 1406)   Vital Signs (24h Range):  Temp:  [98.2 °F (36.8 °C)-98.9 °F (37.2 °C)] 98.2 °F (36.8 °C)  Pulse:  [81-93] 83  Resp:  [12-24] 24  SpO2:  [95 %-100 %] 95 %  BP: ()/(28-87) 101/54     Weight: 91.6 kg (202 lb)  Body mass index is 32.6 kg/m².    Physical Exam  Constitutional:       Comments: Sitting up in bed, alert and readily conversational despite being on low pressors, in no distress    HENT:      Head: Normocephalic.      Nose: Nose normal.      Mouth/Throat:      Mouth: Mucous membranes are dry.   Eyes:      Extraocular Movements: Extraocular movements intact.   Cardiovascular:      Rate and Rhythm: Normal rate.   Pulmonary:      Effort: Pulmonary effort is normal.      Comments: Wearing NC oxygen; no increased work of breathing    Abdominal:      Comments: (Deferred, was just examined by general surgery)    Skin:     General: Skin is warm.   Neurological:      General: No focal deficit present.      Mental Status: She is oriented to person, place, and time.   Psychiatric:         Mood and Affect: Mood normal.         Behavior: Behavior normal.       Significant Labs: All pertinent labs within the past 24 hours have been reviewed.  CBC:   Recent Labs   Lab 12/23/22  1015 12/23/22  1028   WBC 7.65  --    HGB 7.6*  --    HCT 22.8* 23*   MCV 97  --    *  --      BMP:  Recent Labs   Lab 12/23/22  1015   GLU 86   *   K 3.7   CL 98   CO2 20*   BUN 39*   CREATININE 2.1*   CALCIUM 6.9*   MG 1.0*     LFT:  Lab Results   Component Value Date    AST 27 12/23/2022    ALKPHOS 87 12/23/2022    BILITOT 0.4 12/23/2022     Albumin:   Albumin   Date Value Ref Range Status   12/23/2022 2.2 (L) 3.5 - 5.2 g/dL Final     Protein:   Total Protein   Date Value Ref Range Status   12/23/2022 5.2 (L) 6.0 - 8.4 g/dL Final     Lactic acid:   Lab Results   Component Value Date    LACTATE 3.8 (HH) 12/23/2022    LACTATE 0.6 04/29/2021       Significant Imaging: I have reviewed all pertinent imaging results/findings within the past 24 hours.

## 2022-12-23 NOTE — PROGRESS NOTES
Hematology - oncology PROGRESS NOTE     Subjective:       Patient ID: Nalini Varma is a 72 y.o. female.     Chief Complaint: follow up for chronic anemia and lung cancer     Diagnosis:  1. Anemia of chronic disease  2. Stage I Lung adenocarcinoma of right upper lobe, lepidic pattern, s/p SBRT in Oct/Nov 2021  3. Recurrent Lung cancer 2022     Oncologic History:  1. Ms Varma is a 68 yo woman with chronic pain, anxiety, COPD on 2 liters of home oxygen, CHF with preserved ejection fraction, HTN, CKD stage 3 (creatinine 1.6), who presents for further evaluation of newly diagnosed lung adenocarcinoma. She underwent a screening CT chest on 7/23/20, which showed a 0.6 cm right upper lobe noncalcified pulmonary nodule which measured 0.4 cm on previous CT dated 03/20/2019.  There is a pleural based 1.2 cm pulmonary nodule within the right upper lobe which measured 0.9 cm on previous CT.  There is a 1.2 cm partially spiculated right upper lobe noncalcified pulmonary nodule which measured 0.6 cm on previous CT. She underwent a right lung biopsy on 9/4/2020. Pathology showed adenocarcinoma, well differentiated, lepidic pattern, cannot rule out an invasive component. She presents today for further evaluation. Has been smoking 2PPD for 50 years. Enrolled in smoking cessation program. Now down to 1PPD. Has been on 2L O2 for many years. Needs to use 3 liters when she is wearing a mask. Has CHF. On torsemide. Uses two pillows at night. Unable to walk for a block due to dyspnea. No weight loss. Lives by herself  2. CT head negative for metastases. PET scan 10/7/20: Two right upper lobe nodule with no appreciable activity.  Please note that FDG PET has poor sensitivity for small, well differentiated, and/or indolent malignancies.  An additional subcentimeter node in the apex is too small to characterize by PET for which attention on follow-up is recommended. Mildly enlarged mediastinal lymph nodes, as above, with no  "hypermetabolic activity.  Metastatic involvement is not excluded.  3. Seen by Dr Mead. Underwent SBRT 10/29/21-21       Interval History:   Patient c/o diarrhea. Dizziness. Some blood nasal discharge. No blood in stool. No fever.     ECO     ROS:   A ten-point system review is obtained and negative except for what was stated in the Interval History.      Physical Examination:   BP (!) 68/39   Pulse 90   Temp 98.7 °F (37.1 °C) (Oral)   Resp (!) 22   Ht 5' 6" (1.676 m)   Wt 96.3 kg (212 lb 4.9 oz)   SpO2 98%   BMI 34.27 kg/m²     Physical Exam  Vitals and nursing note reviewed.   Constitutional:       General: She is not in acute distress.  HENT:      Head: Normocephalic and atraumatic.      Mouth/Throat:      Pharynx: No oropharyngeal exudate.   Eyes:      General: No scleral icterus.        Right eye: No discharge.         Left eye: No discharge.   Neck:      Thyroid: No thyromegaly.      Trachea: No tracheal deviation.   Cardiovascular:      Rate and Rhythm: Regular rhythm. Tachycardia present.      Heart sounds: Normal heart sounds. No murmur heard.  Pulmonary:      Effort: Pulmonary effort is normal. No respiratory distress.      Breath sounds: Normal breath sounds. Decreased air movement present. No wheezing or rales.   Abdominal:      General: Bowel sounds are normal. There is no distension.      Palpations: Abdomen is soft.      Tenderness: There is no abdominal tenderness.   Musculoskeletal:         General: No tenderness. Normal range of motion.      Cervical back: Normal range of motion and neck supple.   Lymphadenopathy:      Cervical: No cervical adenopathy.   Skin:     General: Skin is warm and dry.      Findings: No rash.   Neurological:      Mental Status: She is alert and oriented to person, place, and time.      Cranial Nerves: No cranial nerve deficit.      Gait: Gait is intact.   Psychiatric:         Mood and Affect: Mood and affect normal.       Objective:      Laboratory " Data:  Labs reviewed.      Imaging Data:  PET scan 10/7/2020:  Impression:     1. Two right upper lobe nodule with no appreciable activity.  Please note that FDG PET has poor sensitivity for small, well differentiated, and/or indolent malignancies.  An additional subcentimeter node in the apex is too small to characterize by PET for which attention on follow-up is recommended.  2. Mildly enlarged mediastinal lymph nodes, as above, with no hypermetabolic activity.  Metastatic involvement is not excluded.  3. Additional CT findings, as above.     CT head 9/25/20:  Impression:     No evidence of acute intracranial pathology.     Moderate patchy mucoperiosteal thickening in the paranasal sinuses.    CT chest 2/11/2021:  Development of central cavitation in previously described 12 mm right upper lobe pulmonary nodule which may be inflammatory in nature including tuberculous and non tuberculous mycobacterial infection, fungal infection etc.  Neoplasm is also included in the differential.     Improved subpleural right upper lobe nodular opacity and stable ground-glass nodule right apex.     Stable calcified granuloma and postinflammatory changes in the lingula probably related to previous non tuberculous mycobacterial infection.     Severe atherosclerosis and severe hepatic steatosis            Assessment and Plan:      1. Recurrent adenocarcinoma of left lung    2. Secondary malignant neoplasm of intrathoracic lymph nodes    3. Chronic respiratory failure with hypoxia    4. Hypotension, unspecified hypotension type    5. Diarrhea, unspecified type        Hypotension/ dizziness/ diarrhea/ JONATHAN/ hypomagnesemia/ anemia   Bp 70/40s. Concern for sepsis. [facility] called. ER notified. Admit for further management. No chemo or XRT today.   Transfuse pRBC to keep hb around 8 while on XRT      Recurrent lung cancer   Pt had adenocarcinoma which was treated with SBRT in Oct/Nov 2020 with Dr Mead. Recent imaging with CT and PET  concerning for recurrence and jamal involvement. She is poor surgical candidate. We discussed weekly chemo along with radiation. If pt is not able to tolerate the chemo, will dose reduce or discontinue chemo. Pt understands the risks and benefits and prefers to try chemo with radiation with the understanding that if treatment becomes too uncomfortable we will hold chemo and pursue with radiation alone.   Had repeat biopsy procedure, couldn't get tissue, had PTX, req chest tube  Repeat imaging concerning for recurrent and progression of ds. Case d/w Dr Mead over phone on 11/7/22, concurrent chemo radiation to be tried. Plan for weekly carbo taxol . If pt is unable to tolerate chemo or has transportation issues, Dr Mead plans to change back to hypo fractionated course of radiation. Due to performance status and cor morbidities pt is not a candidate for Q3 week chemotherapy.   Labs reviewed, JONATHAN, low mag. Ending radiation in January.  Mild leukopenia note    LESLY- on venofer, monitor.    Pancreatic lesion- being followed by Dr Cheema.             Future Appointments   Date Time Provider Department Center   12/30/2022  9:15 AM CHEMO 07 [facility] CHEMO [facility]   1/4/2023  2:30 PM [facility] PS[facility] [address]   1/25/2023  1:30 PM Yane Sahni MD [facility] IM [facility]

## 2022-12-23 NOTE — PLAN OF CARE
Problem: Adult Inpatient Plan of Care  Goal: Plan of Care Review  Outcome: Ongoing, Progressing  Goal: Optimal Comfort and Wellbeing  Outcome: Ongoing, Progressing     Problem: Coping Ineffective  Goal: Effective Coping  Outcome: Ongoing, Progressing     Problem: Bleeding (Sepsis/Septic Shock)  Goal: Absence of Bleeding  Outcome: Ongoing, Progressing

## 2022-12-23 NOTE — ED NOTES
POCT COVID-19 [facility] IN PROGRESS  POCT INFLUENZA A/B [facility] IN PROGRESS   Bill For Surgical Tray: no Billing Type: Third-Party Bill Expected Date Of Service: 01/25/2021

## 2022-12-23 NOTE — ASSESSMENT & PLAN NOTE
- Reason for ICU admission   - Likely abdominal source; abx and pressors per primary team. General surgery consulted; no immediate plans for intervention.

## 2022-12-23 NOTE — CONSULTS
[facility] - Intensive Care ([place])  Palliative Medicine  Consult Note    Patient Name: Nalini Varma  MRN: [id number]  Admission Date: 12/23/2022  Hospital Length of Stay: 0 days  Code Status: Full Code   Attending Provider: AMBER Austin MD  Consulting Provider: Beata Parks MD  Primary Care Physician: Yane Sahni MD  Principal Problem:Septic shock    Patient information was obtained from patient, past medical records, ER records and primary team.      Inpatient consult to Palliative Care  Consult performed by: Beata Parks MD  Consult ordered by: AMBER Austin MD  Reason for consult: Advance Care Planning         Assessment/Plan:     Septic shock  - Reason for ICU admission   - Likely abdominal source; abx and pressors per primary team. General surgery consulted; no immediate plans for intervention.     Advance Care Planning       12/23/22  - Consult for introduction to palliative medicine and advance care planning in patient with underlying recurrent lung adenocarcinoma (on chemo/XRT, possible lymph node involvement) being admitted to the ICU for septic shock after a rapid response during scheduled oncology clinic visit today. Chart reviewed in depth; discussed pt with Dr Austin prior to visiting patient.   - Met with pt at bedside; despite being on low dose vasopressors, she was awake, alert, readily conversational, and humorous. Introduced role of palliative medicine, and learned more about pt outside of hospital. She is single, and has two sons (Kaz and Augie, collectively her preferred MPOA in the event she is unable to make her own medical decisions). In addition to her two sons, she proudly told me about a step-grandson and a great granddaughter (9 months old); family all resides in [place].   - She moved to [place] for work ( in [employer] industry), and has been here ever since. She has lots of knowledge of [employer] and [employer] boats, and mention's [facility] as one of  "her favorites before it closed. She has never been a gambler.   - At baseline, pt lives independently ("I fly solo") and completes her own ADLs, though notes she's had a tough last few days due to her acute illness.   - Dr Dillon (general surgery) joined us for part of visit; she informed pt that we will need to watch her closely for now, though no immediate plans for intervention. Per discussion with pt, she is agreeable with continuing current level of care (including procedures if needed) with the goal of leaving hospital and continuing oncological therapy after recovery. She was recently approved to adopt a cat, so this is something she is looking forward to.   - Given her planned ICU admission, offered to call her sons to update them; however, she declined this offer, as she doesn't want to worry her sons (one is dealing with health issues). However, she said she will let them know she is in the hospital; told her this is a good idea, so that they at least know where she is.   - Our team will follow up with pt during hospital stay; updated primary team after visit     JONATHAN (acute kidney injury)  - Likely secondary to dehydration from diarrhea and poor PO intake during illness   - Fluids and management per primary team     Recurrent adenocarcinoma of left lung  - Follows closely with Dr Gilmore; is getting chemo/XRT though per patient has missed last 4 fractions of XRT due to acute illness. Her goal is continue aggressive oncological therapy after recovery from illness and hospitalization.   - Per clinic notes, "Pt had adenocarcinoma which was treated with SBRT in Oct/Nov 2020 with Dr Mead. Recent imaging with CT and PET concerning for recurrence and jamal involvement. She is poor surgical candidate. We discussed weekly chemo along with radiation. If pt is not able to tolerate the chemo, will dose reduce or discontinue chemo. Pt understands the risks and benefits and prefers to try chemo with radiation with the " "understanding that if treatment becomes too uncomfortable we will hold chemo and pursue with radiation alone."     CKD (chronic kidney disease) stage 3, GFR 30-59 ml/min  - Noted     Chronic combined systolic and diastolic CHF (congestive heart failure)  - EF approximately 40%; management per primary team     Chronic obstructive pulmonary disease  - Management per primary team; is on home oxygen at baseline     Thank you for your consult. I will follow-up with patient. Please contact us if you have any additional questions.     GASTON Burris  Palliative Medicine Staff   [phone]    > 50% of 70 min visit spent in chart review, face to face discussion of symptom assessment, coordination of care with other specialists, goals of care, emotional support, formulating and communicating prognosis, exploring burden/ benefit of various approaches of treatment.     Subjective:     HPI:   (From ER notes) "Nalini Varma is a 72 y.o. female  with chronic pain, anxiety, COPD on 2 liters of home oxygen, CHF (EF of 38%), HTN, CKD stage 3 and lung adenocarcinoma presenting to the emergency department after rapid response evaluation in the Hematology/Oncology Clinic.  Patiently recently diagnosed with lung adenocarcinoma and last received chemotherapy 1 week ago.  She was going to a follow-up visit today when her blood pressure was noted to be 68/39 in clinic.  Repeat blood pressures within 20 minutes was 74/43 and 68/39.  Patient was sitting in her wheelchair during this time.  She states she did not feel lightheaded.  No episodes of syncope.  She is currently reporting chronic pain to her left leg and right great toe pain after a blister popped a few days ago.  She does report nocturnal diarrhea for the past week.  Approximately 1-2 episodes of watery, dark red/brown stool per night.  She denies abdominal pain or vomiting.  She is reporting urinary frequency but with subjective decreased urinary output.  She denies " "headache, lightheadedness, chest pain or shortness of breath.  No recent antibiotic use.  This is the extent to the patients complaints today here in the emergency department."     Palliative medicine is consulted for support and advance care planning. Met with pt at bedside; see ACP section of plan for details.        Past Medical History:   Diagnosis Date    Anxiety     Cervical spinal stenosis     Choledocholithiasis     Chronic obstructive pulmonary disease 03/16/2016    Degenerative disc disease of cervical spine     Diverticulosis 04/24/2018    History of alcohol abuse 03/02/2021    History of gastric ulcer     History of L3 compression fracture 12/19/2018    History of lung cancer     s/p completion of XRT in 10/2020    Hyperlipidemia     Iron deficiency anemia     Osteoarthritis     Stage III CKD 2017       Past Surgical History:   Procedure Laterality Date    BREAST CYST EXCISION Right     1967    CATARACT EXTRACTION      COLONOSCOPY  2015    COLONOSCOPY N/A 6/8/2022    Procedure: COLONOSCOPY;  Surgeon: Helio Cheema MD;  Location: [address]);  Service: Endoscopy;  Laterality: N/A;  5/25-Pt requesting Dr. Cheema-approval given per Dr. Cheema-ml  Fully vaccinated, prep instr portal -ml    CORONARY ANGIOGRAPHY N/A 7/20/2018    Procedure: ANGIOGRAM, CORONARY ARTERY;  Surgeon: Willard Roberts MD;  Location: [facility] CATH LAB;  Service: Cardiovascular;  Laterality: N/A;    ENDOSCOPIC ULTRASOUND OF UPPER GASTROINTESTINAL TRACT N/A 3/24/2021    Procedure: ULTRASOUND, UPPER GI TRACT, ENDOSCOPIC;  Surgeon: Helio Cheema MD;  Location: [address]);  Service: Endoscopy;  Laterality: N/A;    ENDOSCOPIC ULTRASOUND OF UPPER GASTROINTESTINAL TRACT N/A 4/25/2022    Procedure: ULTRASOUND, UPPER GI TRACT, ENDOSCOPIC;  Surgeon: Helio Cheema MD;  Location: [address]);  Service: Endoscopy;  Laterality: N/A;  cardiac risk assessment 1 per Dr. Ortez. see t/e " 3/17-SC  3/25:new instructions via portal. home with medical transport?-SC    ERCP N/A 3/24/2021    Procedure: ERCP (ENDOSCOPIC RETROGRADE CHOLANGIOPANCREATOGRAPHY);  Surgeon: Helio Cheema MD;  Location: [facility] (2ND FLR);  Service: Endoscopy;  Laterality: N/A;    ERCP N/A 4/30/2021    Procedure: ERCP (ENDOSCOPIC RETROGRADE CHOLANGIOPANCREATOGRAPHY);  Surgeon: Boo Gray MD;  Location: [facility] (2ND FLR);  Service: Endoscopy;  Laterality: N/A;    ERCP N/A 7/1/2021    Procedure: ERCP (ENDOSCOPIC RETROGRADE CHOLANGIOPANCREATOGRAPHY);  Surgeon: Helio Cheema MD;  Location: [facility] (2ND FLR);  Service: Endoscopy;  Laterality: N/A;  Ok for Taxi. Dr Cheema  rapid covid 1130am- tb inst email    ESOPHAGOGASTRODUODENOSCOPY  2015    ESOPHAGOGASTRODUODENOSCOPY N/A 3/4/2021    Procedure: EGD (ESOPHAGOGASTRODUODENOSCOPY);  Surgeon: Yenifer Rmairez MD;  Location: [facility];  Service: Endoscopy;  Laterality: N/A;    ESOPHAGOGASTRODUODENOSCOPY N/A 3/24/2021    Procedure: EGD (ESOPHAGOGASTRODUODENOSCOPY);  Surgeon: Helio Cheema MD;  Location: [facility] (2ND FLR);  Service: Endoscopy;  Laterality: N/A;    EYE SURGERY  2016    Cataracts    FRACTURE SURGERY  2014    JOINT REPLACEMENT  2007    ORIF FEMUR FRACTURE Left     TOTAL KNEE ARTHROPLASTY Left 2007    TUBAL LIGATION         Review of patient's allergies indicates:   Allergen Reactions    Wellbutrin [bupropion hcl] Other (See Comments)     Hyponatremia and hypomagnesia     Zoloft [sertraline] Other (See Comments)     Hyponatremia and hypomagnesia     Bananas [banana]      Emesis, and stomach cramps    Patient states she is not allergic to Banana       Medications:  Continuous Infusions:   NORepinephrine bitartrate-D5W 0.05 mcg/kg/min (12/23/22 2175)     Scheduled Meds:  PRN Meds:sodium chloride    Family History       Problem Relation (Age of Onset)    Alzheimer's disease Mother    Arthritis Father, Brother    Cancer Sister    Colon  cancer Brother    Dementia Mother    Depression Mother    Hypertension Mother, Brother    Miscarriages / Stillbirths Sister    Myasthenia gravis Father    No Known Problems Son    Rectal cancer Father          Tobacco Use    Smoking status: Some Days     Packs/day: 1.00     Years: 53.00     Pack years: 53.00     Types: Cigarettes     Start date: 4/30/1967    Smokeless tobacco: Never    Tobacco comments:     I had quit for about 6 months this past year. Then massive stress and started back up sometimes   Substance and Sexual Activity    Alcohol use: Yes     Alcohol/week: 7.0 standard drinks     Types: 7 Drinks containing 0.5 oz of alcohol per week     Comment: at least 1 cocktail a day    Drug use: No    Sexual activity: Not Currently     Partners: Male     Birth control/protection: Abstinence, Post-menopausal       Review of Systems   Constitutional:  Positive for fatigue.   HENT:  Negative for congestion.    Eyes:  Negative for visual disturbance.   Respiratory:  Negative for shortness of breath.    Cardiovascular:  Negative for chest pain.   Gastrointestinal:  Positive for abdominal pain, diarrhea and nausea.   Genitourinary:  Positive for decreased urine volume.   Musculoskeletal:  Negative for back pain.   Skin:  Negative for wound.   Neurological:  Negative for syncope.   Psychiatric/Behavioral:  Negative for confusion.    Objective:     Vital Signs (Most Recent):  Temp: 98.2 °F (36.8 °C) (12/23/22 1148)  Pulse: 83 (12/23/22 1406)  Resp: (!) 24 (12/23/22 1406)  BP: (!) 101/54 (12/23/22 1406)  SpO2: 95 % (12/23/22 1406)   Vital Signs (24h Range):  Temp:  [98.2 °F (36.8 °C)-98.9 °F (37.2 °C)] 98.2 °F (36.8 °C)  Pulse:  [81-93] 83  Resp:  [12-24] 24  SpO2:  [95 %-100 %] 95 %  BP: ()/(28-87) 101/54     Weight: 91.6 kg (202 lb)  Body mass index is 32.6 kg/m².    Physical Exam  Constitutional:       Comments: Sitting up in bed, alert and readily conversational despite being on low pressors, in no distress     HENT:      Head: Normocephalic.      Nose: Nose normal.      Mouth/Throat:      Mouth: Mucous membranes are dry.   Eyes:      Extraocular Movements: Extraocular movements intact.   Cardiovascular:      Rate and Rhythm: Normal rate.   Pulmonary:      Effort: Pulmonary effort is normal.      Comments: Wearing NC oxygen; no increased work of breathing   Abdominal:      Comments: (Deferred, was just examined by general surgery)    Skin:     General: Skin is warm.   Neurological:      General: No focal deficit present.      Mental Status: She is oriented to person, place, and time.   Psychiatric:         Mood and Affect: Mood normal.         Behavior: Behavior normal.       Significant Labs: All pertinent labs within the past 24 hours have been reviewed.  CBC:   Recent Labs   Lab 12/23/22  1015 12/23/22  1028   WBC 7.65  --    HGB 7.6*  --    HCT 22.8* 23*   MCV 97  --    *  --      BMP:  Recent Labs   Lab 12/23/22  1015   GLU 86   *   K 3.7   CL 98   CO2 20*   BUN 39*   CREATININE 2.1*   CALCIUM 6.9*   MG 1.0*     LFT:  Lab Results   Component Value Date    AST 27 12/23/2022    ALKPHOS 87 12/23/2022    BILITOT 0.4 12/23/2022     Albumin:   Albumin   Date Value Ref Range Status   12/23/2022 2.2 (L) 3.5 - 5.2 g/dL Final     Protein:   Total Protein   Date Value Ref Range Status   12/23/2022 5.2 (L) 6.0 - 8.4 g/dL Final     Lactic acid:   Lab Results   Component Value Date    LACTATE 3.8 (HH) 12/23/2022    LACTATE 0.6 04/29/2021       Significant Imaging: I have reviewed all pertinent imaging results/findings within the past 24 hours.

## 2022-12-23 NOTE — PROGRESS NOTES
MAURY notified that patient had gone to the ER and would not be needing transportation as scheduled. MAURY notified Christiano at [facility], [phone].     MAURY also notified that patient is having difficulty with wheel chair transfers and that wheelchair transport is needed.  MAURY emailed Lucila at [employer] requesting rides for Tues 12/27-Thurs 12/29.

## 2022-12-23 NOTE — ED PROVIDER NOTES
Source of History:  Patient     Chief complaint:  Hypotension (Pt was a rapid response, informed of hypotension in clinic, report of bp of 68/49, currently in the ER, bp 102/50, A&Ox3, in no distress, calm,)      HPI:  Nalini Varma is a 72 y.o. female  with chronic pain, anxiety, COPD on 2 liters of home oxygen, CHF (EF of 38%), HTN, CKD stage 3 and lung adenocarcinoma presenting to the emergency department after rapid response evaluation in the Hematology/Oncology Clinic.  She was recently diagnosed with lung adenocarcinoma and last received chemotherapy 1 week ago.  She was going to a follow-up visit today when her blood pressure was noted to be 68/39 in clinic.  Repeat blood pressures within 20 minutes was 74/43 and 68/39.  Patient was sitting in her wheelchair during this time.  She states she did not feel lightheaded.  No episodes of syncope.  She is currently reporting chronic pain to her left leg and right great toe pain after a blister popped a few days ago.  She does report nocturnal diarrhea for the past week.  Approximately 1-2 episodes of watery, dark red/brown stool per night.  She denies abdominal pain or vomiting.  She is reporting urinary frequency but with subjective decreased urinary output.  She denies headache, lightheadedness, chest pain or shortness of breath.  No recent antibiotic use.  This is the extent to the patients complaints today here in the emergency department.    ROS: As per HPI and below:  General: No fever.  No chills.    Eyes: No visual changes.  ENT: No sore throat. No congestion.  Head: No headache.    Respiratory: No shortness of breath.  No cough.  Cardiovascular: No chest pain.  Abdomen: + abdominal cramping and diarrhea.  No nausea or vomiting.  Genito-Urinary: + urinary frequency.   Neurologic: No focal weakness.  No numbness.  MSK: + chronic back pain and left leg pain  Integument: No rashes or lesions.  Allergy/immunology:  + immunocompromised     Review of  patient's allergies indicates:   Allergen Reactions    Wellbutrin [bupropion hcl] Other (See Comments)     Hyponatremia and hypomagnesia     Zoloft [sertraline] Other (See Comments)     Hyponatremia and hypomagnesia     Bananas [banana]      Emesis, and stomach cramps    Patient states she is not allergic to Banana       PMH:  As per HPI and below:  Past Medical History:   Diagnosis Date    Anxiety     Cervical spinal stenosis     Choledocholithiasis     Chronic obstructive pulmonary disease 03/16/2016    Degenerative disc disease of cervical spine     Diverticulosis 04/24/2018    History of alcohol abuse 03/02/2021    History of gastric ulcer     History of L3 compression fracture 12/19/2018    History of lung cancer     s/p completion of XRT in 10/2020    Hyperlipidemia     Iron deficiency anemia     Osteoarthritis     Stage III CKD 2017     Past Surgical History:   Procedure Laterality Date    BREAST CYST EXCISION Right     1967    CATARACT EXTRACTION      COLONOSCOPY  2015    COLONOSCOPY N/A 6/8/2022    Procedure: COLONOSCOPY;  Surgeon: Helio Cheema MD;  Location: [address]);  Service: Endoscopy;  Laterality: N/A;  5/25-Pt requesting Dr. Cheema-approval given per Dr. Cheema-ml  Fully vaccinated, prep instr portal -ml    CORONARY ANGIOGRAPHY N/A 7/20/2018    Procedure: ANGIOGRAM, CORONARY ARTERY;  Surgeon: Willard Roberts MD;  Location: [facility] CATH LAB;  Service: Cardiovascular;  Laterality: N/A;    ENDOSCOPIC ULTRASOUND OF UPPER GASTROINTESTINAL TRACT N/A 3/24/2021    Procedure: ULTRASOUND, UPPER GI TRACT, ENDOSCOPIC;  Surgeon: Helio Cheema MD;  Location: [facility]2ND FLR);  Service: Endoscopy;  Laterality: N/A;    ENDOSCOPIC ULTRASOUND OF UPPER GASTROINTESTINAL TRACT N/A 4/25/2022    Procedure: ULTRASOUND, UPPER GI TRACT, ENDOSCOPIC;  Surgeon: Helio Cheema MD;  Location: [facility] ENDO (2ND FLR);  Service: Endoscopy;  Laterality: N/A;  cardiac risk assessment 1 per Dr. Ortez. see t/e  3/17-SC  3/25:new instructions via portal. home with medical transport?-SC    ERCP N/A 3/24/2021    Procedure: ERCP (ENDOSCOPIC RETROGRADE CHOLANGIOPANCREATOGRAPHY);  Surgeon: Helio Cheema MD;  Location: [facility] (2ND FLR);  Service: Endoscopy;  Laterality: N/A;    ERCP N/A 4/30/2021    Procedure: ERCP (ENDOSCOPIC RETROGRADE CHOLANGIOPANCREATOGRAPHY);  Surgeon: Boo Gray MD;  Location: [facility] (2ND FLR);  Service: Endoscopy;  Laterality: N/A;    ERCP N/A 7/1/2021    Procedure: ERCP (ENDOSCOPIC RETROGRADE CHOLANGIOPANCREATOGRAPHY);  Surgeon: Helio Cheema MD;  Location: [facility] (2ND FLR);  Service: Endoscopy;  Laterality: N/A;  Ok for Taxi. Dr Cheema  rapid covid 1130am- tb inst email    ESOPHAGOGASTRODUODENOSCOPY  2015    ESOPHAGOGASTRODUODENOSCOPY N/A 3/4/2021    Procedure: EGD (ESOPHAGOGASTRODUODENOSCOPY);  Surgeon: Yenifer Ramirez MD;  Location: [facility];  Service: Endoscopy;  Laterality: N/A;    ESOPHAGOGASTRODUODENOSCOPY N/A 3/24/2021    Procedure: EGD (ESOPHAGOGASTRODUODENOSCOPY);  Surgeon: Helio Cheema MD;  Location: [facility] (2ND FLR);  Service: Endoscopy;  Laterality: N/A;    EYE SURGERY  2016    Cataracts    FRACTURE SURGERY  2014    JOINT REPLACEMENT  2007    ORIF FEMUR FRACTURE Left     TOTAL KNEE ARTHROPLASTY Left 2007    TUBAL LIGATION         Social History     Tobacco Use    Smoking status: Some Days     Packs/day: 1.00     Years: 53.00     Pack years: 53.00     Types: Cigarettes     Start date: 4/30/1967    Smokeless tobacco: Never    Tobacco comments:     I had quit for about 6 months this past year. Then massive stress and started back up sometimes   Substance Use Topics    Alcohol use: Yes     Alcohol/week: 7.0 standard drinks     Types: 7 Drinks containing 0.5 oz of alcohol per week     Comment: at least 1 cocktail a day    Drug use: No       Physical Exam:    BP (!) 102/50 (BP Location: Left arm, Patient Position: Sitting)   Pulse 83   Temp 98.9 °F (37.2 °C)  "(Oral)   Resp 20   Ht 5' 6" (1.676 m)   Wt 91.6 kg (202 lb)   SpO2 100%   Breastfeeding No   BMI 32.60 kg/m²   Nursing note and vital signs reviewed.  Appearance: No acute distress.  Nontoxic appearing.  Makes good eye contact.  Eyes:  Conjunctival pallor.  Normal EOM.  ENT:  Mucous membranes dry  Chest/ Respiratory: Clear to auscultation bilaterally.  Good air movement.  No wheezes.  No rhonchi. No rales. No accessory muscle use.  Cardiovascular: Regular rate and rhythm.  Systolic murmur  Abdomen: Soft.  Not distended.  Mild, diffuse tenderness, worse to left lower quadrant.  Green, malodorous stool in diaper.  Hemoccult negative.  No guarding.  No rebound. Non-peritoneal.  Musculoskeletal: Good range of motion all joints.  No deformities.  Neck supple.  No meningismus.  2+ pitting edema to bilateral lower extremities  Skin: No rashes seen.  Poor skin turgor.  Ecchymoses to arm  Right great toe has healing wound with mild skin breakdown from nonintact ulcer.  No surrounding erythema or warmth or drainage.  Neurologic: Motor intact.  Sensation intact.  Cerebellar intact.  Cranial nerves intact.  Mental Status:  Alert and oriented x 3.  Appropriate, conversant.   Labs that have been ordered have been independently reviewed and interpreted by myself.    Critical Care    Date/Time: 12/23/2022 11:45 AM  Performed by: Karel Ching PA-C  Authorized by: Karel Ching PA-C   Direct patient critical care time: 5 minutes  Additional history critical care time: 5 minutes  Ordering / reviewing critical care time: 5 minutes  Documentation critical care time: 5 minutes  Consulting other physicians critical care time: 5 minutes  Consult with family critical care time: 5 minutes  Other critical care time: 5 minutes  Total critical care time (exclusive of procedural time) : 35 minutes  Critical care was necessary to treat or prevent imminent or life-threatening deterioration of the following conditions: circulatory " failure and dehydration.  Critical care was time spent personally by me on the following activities: development of treatment plan with patient or surrogate, interpretation of cardiac output measurements, evaluation of patient's response to treatment, examination of patient, obtaining history from patient or surrogate, ordering and performing treatments and interventions, ordering and review of laboratory studies, ordering and review of radiographic studies, pulse oximetry, re-evaluation of patient's condition and review of old charts.       I decided to obtain the patient's medical records.    MDM/ Differential Dx:    72 y.o. female with extensive comorbidities as listed above who presents to the ED after rapid response from [facility] Onc clinic for hypotension.  Upon arrival to the ED, patient is sitting on the edge of the bed, no complaints of lightheadedness or dizziness.  Repeat blood pressure 102/50.  She is afebrile and non toxic appearing. Suspect hypotension likely secondary to hypovolemia given diarrhea for the past week.  She does appear dehydrated on exam.  But given history of CHF, will be gentle with IV fluid resuscitation.      ED Course as of 12/23/22 1348   Fri Dec 23, 2022   1015 Repeat blood pressure after patient lying flat in the bed is 73/37.  Patient remains asymptomatic.  Mentating well. [AG]   1016 EKG from 9:49 a.m.  Normal sinus rhythm with sinus arrhythmia at a rate of 84 beats per minute.  Rightward axis.  ST flattening to inferior lead V6 which is unchanged from prior EKG from July 2021. [AG]   1135 Patient has temporary response to IV fluid resuscitation.  Map ranging in 40s to 50s.  Will start Levophed drip to maintain map of 65.  [AG]   1136 Given history of bloody stools and decrease in H&H from baseline.  7.6/22.8 will transfuse 1 unit packed red blood cells.  Patient sign blood consent. [AG]   1137 Given concern for infectious versus inflammatory diarrhea and elevated lactic acid, will  "initiate Zosyn. [AG]   1148 Chest x-ray radiology impression reveals:  Small left pleural effusion with adjacent atelectasis or consolidation. Likely from malignancy  [AG]   1231 Was made aware of critical values in real-time, lactic acid of 3.8 and calcium of 6.9.  Chemistry also with hyponatremia and JONATHAN superimposed on CKD.  Creatinine of 2.1.  Magnesium of 1.0.  Procalcitonin is elevated at 0.46. [AG]   1257 MAP significantly improved after Levophed drip, containing  [AG]   1335 CT abd pelvis revealed:   "Air/fluid collection in the pelvis which is inseparable from the sigmoid colon and adjacent uterus.  Unclear if this finding represents air and fluid within a large diverticulum or true perisigmoid abscess.  Suggest correlation for any symptoms of sigmoid diverticulitis or colitis."    Spoke with Dr. Dillon regarding CT abdomen pelvis findings.  Will evaluate.  Agrees with remaining NPO until evaluation. [AG]      ED Course User Index  [AG] Karel Ching PA-C           Diagnostic Impression:    1. Hypotension    2. Dehydration    3. Diarrhea of presumed infectious origin    4. Hyponatremia    5. Hypocalcemia    6. Pleural effusion    7. Anemia, unspecified type    8. Hypomagnesemia    9. Acute kidney injury superimposed on CKD                   Karel Ching PA-C  12/23/22 1349       Karel Ching PA-C  12/23/22 1529       Karel Ching PA-C  12/23/22 1822    "

## 2022-12-23 NOTE — ASSESSMENT & PLAN NOTE
"- Follows closely with Dr Gilmore; is getting chemo/XRT though per patient has missed last 4 fractions of XRT due to acute illness. Her goal is continue aggressive oncological therapy after hospital stay.   - Per clinic notes, "Pt had adenocarcinoma which was treated with SBRT in Oct/Nov 2020 with Dr Mead. Recent imaging with CT and PET concerning for recurrence and jamal involvement. She is poor surgical candidate. We discussed weekly chemo along with radiation. If pt is not able to tolerate the chemo, will dose reduce or discontinue chemo. Pt understands the risks and benefits and prefers to try chemo with radiation with the understanding that if treatment becomes too uncomfortable we will hold chemo and pursue with radiation alone."   "

## 2022-12-23 NOTE — ED NOTES
Bed: 01  Expected date: 12/23/22  Expected time: 9:06 AM  Means of arrival:   Comments:  Deirdre robles

## 2022-12-23 NOTE — HPI
Ms. Varma is a 72/F with PMH COPD, chronic hypoxemic respiratory failure (2L O2 via NC), HFrEF (ECHO 05/2021 EF 38%), HTN, HLD, anxiety, CKDIII, recurrent adenocarcinoma of left lung on chemo/XRT, SIADH, anemia who presented to [facility] 12/23 from Oncology Clinic with hypotension.  She reports she has been feeling slightly weaker than usual and has noticed increased loose stool over the past week with brown/black coloration darker than usual.  She notes at baseline she has some loose stool but reports has increased to 2-3x/daily and has mild abdominal tenderness. Denies fevers, chills, CP, palpitations; does note chronic SOB but no significant increase and reports using her oxygen at 2-3L at home.  Upon presentation to Oncology Clinic she was noted to have significantly low blood pressure with BP 68/39 initially.  On repeat evaluation he remained low and she was sent to ED for further evaluation.  ED workup was notable for mild hyponatremia, hypomagnesemia, JONATHAN with creatinine 2.2 (baseline 1.2), and decrease in Hgb to 7.6 (baseline high 8s-9s).  CXR showed L-sided pleural effusion; CT Abd/Pelvis showed air/fluid collection in pelvis unclear if representing perisigmoid abscess vs large diverticulum. She received IV hydration with 600mL NS in total without significant improvement in pressures. She was started on norepinephrine gtt and received piperacillin-tazobactam. Surgery was consulted and hospital medicine contacted for admission.

## 2022-12-23 NOTE — ED TRIAGE NOTES
"Present to the ER for c/o hypotension while receiving chemotherapy for CA of lung, pt reports last session of radiation yesterday, currently A&Ox3, in no distress,   C/o  "i'm feeling alright, i'm just hurting, my neck, back, shoulders,legs, knees" pt currently rates pan 10/10    Noted arrived with 24 G iv to R wrist, NS 1000ml infusing via gravity, 100ml infused pta  "

## 2022-12-23 NOTE — H&P
[facility] Medicine  History & Physical    Patient Name: Nalini Varma  MRN: [id number]  Patient Class: IP- Inpatient  Admission Date: 12/23/2022  Attending Physician: HOMAR Austin MD  Primary Care Provider: Yane Sahni MD    Patient information was obtained from patient, past medical records and ER records.     Subjective:     Principal Problem:Septic shock    Chief Complaint:   Chief Complaint   Patient presents with    Hypotension     Pt was a rapid response, informed of hypotension in clinic, report of bp of 68/49, currently in the ER, bp 102/50, A&Ox3, in no distress, calm,        HPI: Ms. Varma is a 72/F with PMH COPD, chronic hypoxemic respiratory failure (2L O2 via NC), HFrEF (ECHO 05/2021 EF 38%), HTN, HLD, anxiety, CKDIII, recurrent adenocarcinoma of left lung on chemo/XRT, SIADH, anemia who presented to [facility] 12/23 from Oncology Clinic with hypotension.  She reports she has been feeling slightly weaker than usual and has noticed increased loose stool over the past week with brown/black coloration darker than usual.  She notes at baseline she has some loose stool but reports has increased to 2-3x/daily and has mild abdominal tenderness. Denies fevers, chills, CP, palpitations; does note chronic SOB but no significant increase and reports using her oxygen at 2-3L at home.  Upon presentation to Oncology Clinic she was noted to have significantly low blood pressure with BP 68/39 initially.  On repeat evaluation he remained low and she was sent to ED for further evaluation.  ED workup was notable for mild hyponatremia, hypomagnesemia, JONATHAN with creatinine 2.2 (baseline 1.2), and decrease in Hgb to 7.6 (baseline high 8s-9s).  CXR showed L-sided pleural effusion; CT Abd/Pelvis showed air/fluid collection in pelvis unclear if representing perisigmoid abscess vs large diverticulum. She received IV hydration with 600mL NS in total without significant improvement in  pressures. She was started on norepinephrine gtt and received piperacillin-tazobactam. Surgery was consulted and hospital medicine contacted for admission.    Past Medical History:   Diagnosis Date    Anxiety     Cervical spinal stenosis     Choledocholithiasis     Chronic obstructive pulmonary disease 03/16/2016    Degenerative disc disease of cervical spine     Diverticulosis 04/24/2018    History of alcohol abuse 03/02/2021    History of gastric ulcer     History of L3 compression fracture 12/19/2018    History of lung cancer     s/p completion of XRT in 10/2020    Hyperlipidemia     Iron deficiency anemia     Osteoarthritis     Stage III CKD 2017       Past Surgical History:   Procedure Laterality Date    BREAST CYST EXCISION Right     1967    CATARACT EXTRACTION      COLONOSCOPY  2015    COLONOSCOPY N/A 6/8/2022    Procedure: COLONOSCOPY;  Surgeon: Helio Cheema MD;  [facility]  Service: Endoscopy;  Laterality: N/A;  5/25-Pt requesting Dr. Cheema-approval given per Dr. Cheema-ml  Fully vaccinated, prep instr portal -ml    CORONARY ANGIOGRAPHY N/A 7/20/2018    Procedure: ANGIOGRAM, CORONARY ARTERY;  Surgeon: Willard Roberts MD;  Location: [facility] CATH LAB;  Service: Cardiovascular;  Laterality: N/A;    ENDOSCOPIC ULTRASOUND OF UPPER GASTROINTESTINAL TRACT N/A 3/24/2021    Procedure: ULTRASOUND, UPPER GI TRACT, ENDOSCOPIC;  Surgeon: Helio Cheema MD;  [address]  Service: Endoscopy;  Laterality: N/A;    ENDOSCOPIC ULTRASOUND OF UPPER GASTROINTESTINAL TRACT N/A 4/25/2022    Procedure: ULTRASOUND, UPPER GI TRACT, ENDOSCOPIC;  Surgeon: Helio Cheema MD;  [address]  Service: Endoscopy;  Laterality: N/A;  cardiac risk assessment 1 per Dr. Ortez. see t/e 3/17-SC  3/25:new instructions via portal. home with medical transport?-SC    ERCP N/A 3/24/2021    Procedure: ERCP (ENDOSCOPIC RETROGRADE CHOLANGIOPANCREATOGRAPHY);  Surgeon: Helio GERMAIN  MD Deni;  Location: [facility] (2ND FLR);  Service: Endoscopy;  Laterality: N/A;    ERCP N/A 4/30/2021    Procedure: ERCP (ENDOSCOPIC RETROGRADE CHOLANGIOPANCREATOGRAPHY);  Surgeon: Boo Gray MD;  Location: [facility] (2ND FLR);  Service: Endoscopy;  Laterality: N/A;    ERCP N/A 7/1/2021    Procedure: ERCP (ENDOSCOPIC RETROGRADE CHOLANGIOPANCREATOGRAPHY);  Surgeon: Helio Cheema MD;  Location: [facility] (2ND FLR);  Service: Endoscopy;  Laterality: N/A;  Ok for Taxi. Dr Cheema  rapid covid 1130am- tb inst email    ESOPHAGOGASTRODUODENOSCOPY  2015    ESOPHAGOGASTRODUODENOSCOPY N/A 3/4/2021    Procedure: EGD (ESOPHAGOGASTRODUODENOSCOPY);  Surgeon: Yenifer Ramirez MD;  [facility];  Service: Endoscopy;  Laterality: N/A;    ESOPHAGOGASTRODUODENOSCOPY N/A 3/24/2021    Procedure: EGD (ESOPHAGOGASTRODUODENOSCOPY);  Surgeon: Helio Cheema MD;  Location: [facility] (2ND FLR);  Service: Endoscopy;  Laterality: N/A;    EYE SURGERY  2016    Cataracts    FRACTURE SURGERY  2014    JOINT REPLACEMENT  2007    ORIF FEMUR FRACTURE Left     TOTAL KNEE ARTHROPLASTY Left 2007    TUBAL LIGATION         Review of patient's allergies indicates:   Allergen Reactions    Wellbutrin [bupropion hcl] Other (See Comments)     Hyponatremia and hypomagnesia     Zoloft [sertraline] Other (See Comments)     Hyponatremia and hypomagnesia     Bananas [banana]      Emesis, and stomach cramps    Patient states she is not allergic to Banana       No current facility-administered medications on file prior to encounter.     Current Outpatient Medications on File Prior to Encounter   Medication Sig    ALPRAZolam (XANAX) 0.25 MG tablet Take 1 tablet (0.25 mg total) by mouth daily as needed for Anxiety.    atorvastatin (LIPITOR) 40 MG tablet TAKE 1 TABLET BY MOUTH EVERY DAILY    azelastine (ASTELIN) 137 mcg (0.1 %) nasal spray Instill 1 spray (137 mcg total) by Nasal route 2 (two) times daily.    DULoxetine (CYMBALTA) 60 MG capsule  Take 1 capsule (60 mg total) by mouth once daily.    fluticasone propion-salmeterol 115-21 mcg/dose (ADVAIR HFA) 115-21 mcg/actuation HFAA inhaler Inhale 2 puffs into the lungs every 12 (twelve) hours.    gabapentin (NEURONTIN) 300 MG capsule Take 1 capsule (300 mg total) by mouth 3 (three) times daily.    HYDROcodone-acetaminophen (NORCO)  mg per tablet Take 1 tablet by mouth every 12 (twelve) hours as needed for Pain.    losartan (COZAAR) 100 MG tablet Take 1 tablet (100 mg total) by mouth once daily.    metoprolol succinate (TOPROL-XL) 25 MG 24 hr tablet Take 2 tablets (50 mg total) by mouth once daily.    pantoprazole (PROTONIX) 40 MG tablet Take 1 tablet (40 mg total) by mouth once daily.    promethazine (PHENERGAN) 25 MG tablet Take 1 tablet (25 mg total) by mouth every 6 (six) hours as needed for Nausea.    torsemide (DEMADEX) 20 MG Tab Take 1 tablet (20 mg total) by mouth once daily.    traZODone (DESYREL) 100 MG tablet Take 1 tablet (100 mg total) by mouth every evening.    umeclidinium (INCRUSE ELLIPTA) 62.5 mcg/actuation inhalation capsule Inhale 1 puff into the lungs once daily. Controller    albuterol (VENTOLIN HFA) 90 mcg/actuation inhaler inhale 1-2 puffs as needed every 6 hours for wheezing and shortness of breath    ascorbic acid, vitamin C, (VITAMIN C) 250 MG tablet Take 250 mg by mouth once daily.    b complex vitamins capsule Take 1 capsule by mouth once daily.    biotin 10 mg Tab Take 10 mg by mouth once daily.    calcium carbonate (OS-SANDRINE) 500 mg calcium (1,250 mg) tablet Take 2 tablets (1,000 mg total) by mouth 2 (two) times daily.    cyanocobalamin, vitamin B-12, 1,000 mcg TbSR Take 1,000 mcg by mouth once daily. (Patient not taking: Reported on 12/23/2022)    denosumab (PROLIA) 60 mg/mL Syrg Inject 1 mL (60 mg total) into the skin every 6 (six) months.    fluticasone (FLONASE) 50 mcg/actuation nasal spray 1 spray by Each Nare route 2 (two) times daily as needed for Rhinitis.     guaiFENesin (MUCINEX) 600 mg 12 hr tablet Take 1,200 mg by mouth 2 (two) times daily as needed for Congestion.    lutein 40 mg Cap Take by mouth.    magnesium oxide (MAG-OX) 400 mg (241.3 mg magnesium) tablet Take 1 tablet by mouth every 12 (twelve) hours. (Patient not taking: Reported on 12/23/2022)    multivit-min/iron/folic/lutein (CENTRUM SILVER WOMEN ORAL) Take 1 tablet by mouth once daily.    ondansetron (ZOFRAN-ODT) 8 MG TbDL dissolve 1 tablet (8 mg total) by mouth every 8 (eight) hours as needed (nausea). (Patient not taking: Reported on 12/23/2022)    vitamin D (VITAMIN D3) 1000 units Tab Take 1 tablet (1,000 Units total) by mouth once daily. (Patient not taking: Reported on 12/23/2022)    ZINC ORAL Take by mouth.     Family History       Problem Relation (Age of Onset)    Alzheimer's disease Mother    Arthritis Father, Brother    Cancer Sister    Colon cancer Brother    Dementia Mother    Depression Mother    Hypertension Mother, Brother    Miscarriages / Stillbirths Sister    Myasthenia gravis Father    No Known Problems Son    Rectal cancer Father          Tobacco Use    Smoking status: Some Days     Packs/day: 1.00     Years: 53.00     Pack years: 53.00     Types: Cigarettes     Start date: 4/30/1967    Smokeless tobacco: Never    Tobacco comments:     I had quit for about 6 months this past year. Then massive stress and started back up sometimes   Substance and Sexual Activity    Alcohol use: Yes     Alcohol/week: 7.0 standard drinks     Types: 7 Drinks containing 0.5 oz of alcohol per week     Comment: at least 1 cocktail a day    Drug use: No    Sexual activity: Not Currently     Partners: Male     Birth control/protection: Abstinence, Post-menopausal     Review of Systems   Constitutional:  Positive for fatigue. Negative for chills and fever.   HENT:  Negative for sore throat and trouble swallowing.    Eyes:  Negative for pain and visual disturbance.   Respiratory:  Negative for cough and shortness  of breath.    Cardiovascular:  Negative for chest pain and palpitations.   Gastrointestinal:  Positive for blood in stool and diarrhea. Negative for abdominal pain, nausea and vomiting.   Genitourinary:  Negative for difficulty urinating and dysuria.   Musculoskeletal:  Positive for arthralgias and myalgias.   Skin:  Negative for rash and wound.   Neurological:  Negative for weakness and numbness.   Objective:     Vital Signs (Most Recent):  Temp: 98.1 °F (36.7 °C) (12/23/22 1445)  Pulse: 80 (12/23/22 1645)  Resp: 20 (12/23/22 1645)  BP: 129/61 (12/23/22 1645)  SpO2: 100 % (12/23/22 1645) Vital Signs (24h Range):  Temp:  [98.1 °F (36.7 °C)-98.9 °F (37.2 °C)] 98.1 °F (36.7 °C)  Pulse:  [79-93] 80  Resp:  [12-37] 20  SpO2:  [88 %-100 %] 100 %  BP: ()/(28-87) 129/61     Weight: 95.8 kg (211 lb 3.2 oz)  Body mass index is 34.09 kg/m².    Physical Exam  Vitals and nursing note reviewed.   Constitutional:       General: She is not in acute distress.     Appearance: She is well-developed.   HENT:      Head: Normocephalic and atraumatic.      Mouth/Throat:      Mouth: Mucous membranes are dry.   Eyes:      General:         Right eye: No discharge.         Left eye: No discharge.      Conjunctiva/sclera: Conjunctivae normal.   Cardiovascular:      Rate and Rhythm: Normal rate.      Pulses: Normal pulses.   Pulmonary:      Effort: Pulmonary effort is normal. No respiratory distress.   Abdominal:      Palpations: Abdomen is soft.      Tenderness: There is abdominal tenderness (mild LLQ).   Musculoskeletal:         General: Normal range of motion.      Right lower leg: Edema present.      Left lower leg: Edema present.   Skin:     General: Skin is warm and dry.      Comments: R 1st toe medial small ulcer.   Neurological:      Mental Status: She is alert and oriented to person, place, and time.      Significant Labs:   CBC:  Recent Labs   Lab 12/23/22  0744 12/23/22  1015 12/23/22  1028   WBC 8.54 7.65  --    HGB 8.1* 7.6*   --    HCT 24.7* 22.8* 23*   * 138*  --    GRAN 6.2 70.0  5.4  --    LYMPH  --  7.1*  0.5*  --    MONO  --  21.7*  1.7*  --    EOS  --  0.0  --    BASO  --  0.02  --    CMP:  Recent Labs   Lab 12/23/22  0744 12/23/22  1015   * 129*   K 4.6 3.7   CL 97 98   CO2 18* 20*   BUN 39* 39*   CREATININE 2.2* 2.1*   GLU 86 86   CALCIUM 7.7* 6.9*   MG 1.0* 1.0*   PHOS  --  3.5   ALKPHOS 93 87   AST 31 27   ALT 24 23   BILITOT 0.4 0.4   PROT 5.7* 5.2*   ALBUMIN 2.4* 2.2*   ANIONGAP 13 11      Significant Imaging:   Imaging Results               CT Abdomen Pelvis  Without Contrast (Final result)  Result time 12/23/22 13:23:59      Final result by Harry Gamez MD (12/23/22 13:23:59)                   Impression:      Air/fluid collection in the pelvis which is inseparable from the sigmoid colon and adjacent uterus.  Unclear if this finding represents air and fluid within a large diverticulum or true perisigmoid abscess.  Suggest correlation for any symptoms of sigmoid diverticulitis or colitis.  Follow-up CT with IV and/or enteric contrast may provide improved sensitivity when clinically warranted.    Liquid stool in the colon.  Mild rectal wall thickening.    Bilateral perinephric fat stranding and bilateral hyperdense renal lesions, similar to prior studies.    Additional findings discussed in the body of the report.    This report was flagged in Epic as abnormal.      Electronically signed by: Harry Gamez MD  Date:    12/23/2022  Time:    13:23               Narrative:    EXAMINATION:  CT ABDOMEN PELVIS WITHOUT CONTRAST    CLINICAL HISTORY:  LLQ abdominal pain;    TECHNIQUE:  Low dose axial images, sagittal and coronal reformations were obtained from the lung bases to the pubic symphysis.  Oral contrast was not administered.    COMPARISON:  PET-CT, 07/22/2022.  CT abdomen pelvis, 04/29/2021 and 03/22/2021.    FINDINGS:  Lower chest: Heart size is normal. Linear subsegmental atelectasis or scarring in  the left lung base.  No pleural or pericardial effusion.    Abdomen:    Evaluation of the solid abdominal organs and bowel is limited in the absence of IV contrast.    Liver is normal in size and contour without focal contour deforming lesion. Gallbladder is distended but otherwise unremarkable.  No calcified gallstones.  Common bile duct appears mildly distended though similar to priors.  No intra-or extrahepatic biliary ductal dilatation.    Spleen, adrenals, and pancreas are negative for acute finding.    Kidneys are symmetric.  Mild renal atrophy.  Bilateral perinephric fat stranding.  Multiple bilateral hyperdense renal lesions, the largest measuring 1.5 cm in the right kidney (axial image 72).  Largest hyperdense lesion in the left kidney measures approximately 1.4 cm in size (axial image 73).  No hydronephrosis.    No small bowel obstruction.  There is liquid stool throughout the colon.  Hyperdense fluid noted in the cecum and appendix.  Appendix is not distended.  Multiple colonic diverticula most pronounced in the descending and sigmoid colon.  There is a new large air/fluid collection adjacent to the sigmoid colon measuring approximately 6 x 5 cm in size (axial image 133).  Unclear if this finding represents fluid and air within a large diverticulum or true perisigmoid abscess, noting that evaluation of this finding is limited in the absence of IV and enteric contrast.  No gross pneumoperitoneum identified.  No additional organized fluid collection.  No portal venous gas or pneumatosis.    Abdominal aorta is normal in caliber with severe calcific atherosclerosis.    No abdominal lymphadenopathy.    Pelvis: Urinary bladder is unremarkable.  Uterus is inseparable from a large air/fluid collection in the pelvis as well as the adjacent sigmoid colon.  There is mild rectal wall thickening.  Trace pelvic free fluid.    Bones and soft tissues: No aggressive osseous lesions. Stable significant height loss of L3  vertebral body when compared back to multiple prior studies.  Multilevel degenerative changes in the spine.  Mild sclerosis involving the bilateral femoral heads which may be related to early osteonecrosis.  Mild body wall edema.  Extraperitoneal soft tissues are negative for acute finding.                                       X-Ray Toe 2 or More Views Right (Final result)  Result time 12/23/22 10:43:48      Final result by Maya Presley MD (12/23/22 10:43:48)                   Impression:      Please see above.      Electronically signed by: Maya Presley  Date:    12/23/2022  Time:    10:43               Narrative:    EXAMINATION:  XR TOE 2 OR MORE VIEWS RIGHT    CLINICAL HISTORY:  toe pain;    TECHNIQUE:  Three views of the right toes were performed    COMPARISON:  09/21/2022    FINDINGS:  I see no acute fracture or dislocation.    Degenerative change 1st MTP joint. Second digit crosses over the first.                                       X-Ray Chest AP Portable (Final result)  Result time 12/23/22 10:42:39      Final result by Maya Presley MD (12/23/22 10:42:39)                   Impression:      Small left pleural effusion with adjacent atelectasis or consolidation.      Electronically signed by: Maya Presley  Date:    12/23/2022  Time:    10:42               Narrative:    EXAMINATION:  XR CHEST AP PORTABLE    CLINICAL HISTORY:  Sepsis;    TECHNIQUE:  Single frontal view of the chest was performed.    COMPARISON:  09/14/2022    FINDINGS:  Lungs are well expanded.  Small left pleural effusion with adjacent atelectasis or consolidation.  Stable opacity right upper lobe compared to recent chest CT.  Otherwise, right lung is fairly clear.    Cardiac silhouette is stable in size.  Calcified plaque lines arch.                                    Assessment/Plan:     * Septic shock  - Shock presumed septic in setting of ?intraabdominal fluid collection / loose stool / hypotension not responsive to  fluids alone.  - Continue piperacillin-tazobactam 4.5g IV q8hr with suspected intra-abdominal source.  - Check C diff, stool studies.  - Continue norepinephrine gtt; wean as feasible. Additional fluid resuscitation.  - If persistently requiring pressor support will consult pulm/crit for assistance with shock.  - Surgery consulted in ED; appreciate assistance. Conservative management, no current indications for operative intervention.    Anemia of chronic disease  - Anemia in setting of malignancy / chemo / XRT. Patient reports darker stool, though was occult negative from stool produced in ED.  - Discussed with oncology; transfuse 1U pRBCs with goal Hgb > 8.  - Trend Hgb.    JONATHAN (acute kidney injury)  CKDIII  - JONATHAN likely secondary to GI losses / dehydration.  - IVFs as feasible; if fails to respond will check urine lytes / renal U/S to further evaluate.    Recurrent adenocarcinoma of left lung  - Lung adenocarcinoma with recurrence undergoing current XRT/chemo with Dr. Gilmore.  - Palliative consult for assistance with goals of care discussions.    Syndrome of inappropriate secretion of antidiuretic hormone  - Mild hyponatremia with h/o SIADH; monitor.    Chronic combined systolic and diastolic CHF (congestive heart failure)  - Last echo 05/2021 showed EF 38%.  - Appears volume depleted; monitor closely in setting of shock / fluid resuscitation.  - Hold diuretics in setting of shock / JONATHAN.    Generalized anxiety disorder  - Continue alprazolam 0.25mg PO daily PRN.    Hyperlipidemia  - Continue atorvastatin 40mg PO daily.    Essential hypertension  - Hold antihypertensives in setting of shock.    Chronic obstructive pulmonary disease  Chronic hypoxemic resp failure  - Continue controller with fluticasone-vilanterol 100-25mcg inhaled daily, tiotropium 5mcg inhaled daily. Start albuterol-ipratropium 2.5-0.5mg inhaled q4hr PRN.  - Continue supplemental O2; uses 2L at baseline.      VTE Risk Mitigation (From admission,  onward)           Ordered     Reason for No Pharmacological VTE Prophylaxis  Once        Question:  Reasons:  Answer:  Risk of Bleeding    12/23/22 1609     IP VTE HIGH RISK PATIENT  Once         12/23/22 1609     Place sequential compression device  Until discontinued         12/23/22 1609                  Critical due to shock.    Critical care time spent on the evaluation and treatment of severe organ dysfunction, review of pertinent labs and imaging studies, discussions with consulting providers and discussions with patient/family: 45 minutes.    HOMAR Austin MD  Department of Hospital Medicine   [facility] - Emergency Dept

## 2022-12-24 PROBLEM — R57.9 SHOCK, UNSPECIFIED: Status: ACTIVE | Noted: 2022-12-23

## 2022-12-24 LAB
ALBUMIN SERPL BCP-MCNC: 1.9 G/DL (ref 3.5–5.2)
ALP SERPL-CCNC: 92 U/L (ref 55–135)
ALT SERPL W/O P-5'-P-CCNC: 21 U/L (ref 10–44)
ANION GAP SERPL CALC-SCNC: 7 MMOL/L (ref 8–16)
AST SERPL-CCNC: 24 U/L (ref 10–40)
AV INDEX (PROSTH): 0.5
AV MEAN GRADIENT: 27 MMHG
AV PEAK GRADIENT: 45 MMHG
AV VALVE AREA: 2 CM2
AV VELOCITY RATIO: 0.51
BASOPHILS # BLD AUTO: 0.01 K/UL (ref 0–0.2)
BASOPHILS NFR BLD: 0.2 % (ref 0–1.9)
BILIRUB SERPL-MCNC: 0.8 MG/DL (ref 0.1–1)
BILIRUB UR QL STRIP: NEGATIVE
BNP SERPL-MCNC: 550 PG/ML (ref 0–99)
BSA FOR ECHO PROCEDURE: 2.11 M2
BUN SERPL-MCNC: 37 MG/DL (ref 8–23)
CALCIUM SERPL-MCNC: 6.5 MG/DL (ref 8.7–10.5)
CHLORIDE SERPL-SCNC: 100 MMOL/L (ref 95–110)
CLARITY UR: CLEAR
CO2 SERPL-SCNC: 21 MMOL/L (ref 23–29)
COLOR UR: YELLOW
CREAT SERPL-MCNC: 1.6 MG/DL (ref 0.5–1.4)
CV ECHO LV RWT: 0.47 CM
DIFFERENTIAL METHOD: ABNORMAL
DOP CALC AO PEAK VEL: 3.35 M/S
DOP CALC AO VTI: 73.1 CM
DOP CALC LVOT AREA: 4 CM2
DOP CALC LVOT DIAMETER: 2.25 CM
DOP CALC LVOT PEAK VEL: 1.71 M/S
DOP CALC LVOT STROKE VOLUME: 146.25 CM3
DOP CALCLVOT PEAK VEL VTI: 36.8 CM
E WAVE DECELERATION TIME: 222.09 MSEC
E/A RATIO: 1.17
E/E' RATIO: 20.46 M/S
ECHO LV POSTERIOR WALL: 1.06 CM (ref 0.6–1.1)
EJECTION FRACTION: 55 %
EOSINOPHIL # BLD AUTO: 0 K/UL (ref 0–0.5)
EOSINOPHIL NFR BLD: 0.4 % (ref 0–8)
ERYTHROCYTE [DISTWIDTH] IN BLOOD BY AUTOMATED COUNT: 14.6 % (ref 11.5–14.5)
EST. GFR  (NO RACE VARIABLE): 34 ML/MIN/1.73 M^2
FRACTIONAL SHORTENING: 24 % (ref 28–44)
GLUCOSE SERPL-MCNC: 96 MG/DL (ref 70–110)
GLUCOSE UR QL STRIP: NEGATIVE
HCT VFR BLD AUTO: 22.5 % (ref 37–48.5)
HGB BLD-MCNC: 7.6 G/DL (ref 12–16)
HGB UR QL STRIP: NEGATIVE
IMM GRANULOCYTES # BLD AUTO: 0.02 K/UL (ref 0–0.04)
IMM GRANULOCYTES NFR BLD AUTO: 0.4 % (ref 0–0.5)
INTERVENTRICULAR SEPTUM: 1.33 CM (ref 0.6–1.1)
IVC DIAMETER: 1.52 CM
IVRT: 62.8 MSEC
KETONES UR QL STRIP: NEGATIVE
LA MAJOR: 5.5 CM
LA MINOR: 5.43 CM
LA WIDTH: 4.1 CM
LEFT ATRIUM SIZE: 4.38 CM
LEFT ATRIUM VOLUME INDEX MOD: 37.6 ML/M2
LEFT ATRIUM VOLUME INDEX: 40.7 ML/M2
LEFT ATRIUM VOLUME MOD: 77 CM3
LEFT ATRIUM VOLUME: 83.42 CM3
LEFT INTERNAL DIMENSION IN SYSTOLE: 3.41 CM (ref 2.1–4)
LEFT VENTRICLE DIASTOLIC VOLUME INDEX: 44.41 ML/M2
LEFT VENTRICLE DIASTOLIC VOLUME: 91.04 ML
LEFT VENTRICLE MASS INDEX: 95 G/M2
LEFT VENTRICLE SYSTOLIC VOLUME INDEX: 23.3 ML/M2
LEFT VENTRICLE SYSTOLIC VOLUME: 47.81 ML
LEFT VENTRICULAR INTERNAL DIMENSION IN DIASTOLE: 4.47 CM (ref 3.5–6)
LEFT VENTRICULAR MASS: 194.88 G
LEUKOCYTE ESTERASE UR QL STRIP: NEGATIVE
LV LATERAL E/E' RATIO: 22.17 M/S
LV SEPTAL E/E' RATIO: 19 M/S
LVOT MG: 6.13 MMHG
LVOT MV: 1.14 CM/S
LYMPHOCYTES # BLD AUTO: 0.5 K/UL (ref 1–4.8)
LYMPHOCYTES NFR BLD: 8.7 % (ref 18–48)
MAGNESIUM SERPL-MCNC: 1.6 MG/DL (ref 1.6–2.6)
MCH RBC QN AUTO: 32.2 PG (ref 27–31)
MCHC RBC AUTO-ENTMCNC: 33.8 G/DL (ref 32–36)
MCV RBC AUTO: 95 FL (ref 82–98)
MONOCYTES # BLD AUTO: 1.3 K/UL (ref 0.3–1)
MONOCYTES NFR BLD: 23.1 % (ref 4–15)
MV PEAK A VEL: 1.14 M/S
MV PEAK E VEL: 1.33 M/S
MV STENOSIS PRESSURE HALF TIME: 64.41 MS
MV VALVE AREA P 1/2 METHOD: 3.42 CM2
NEUTROPHILS # BLD AUTO: 3.6 K/UL (ref 1.8–7.7)
NEUTROPHILS NFR BLD: 67.2 % (ref 38–73)
NITRITE UR QL STRIP: NEGATIVE
NRBC BLD-RTO: 0 /100 WBC
PH UR STRIP: 5 [PH] (ref 5–8)
PHOSPHATE SERPL-MCNC: 3.3 MG/DL (ref 2.7–4.5)
PISA TR MAX VEL: 3.34 M/S
PLATELET # BLD AUTO: 128 K/UL (ref 150–450)
PMV BLD AUTO: 10.2 FL (ref 9.2–12.9)
POTASSIUM SERPL-SCNC: 3.4 MMOL/L (ref 3.5–5.1)
PROT SERPL-MCNC: 4.7 G/DL (ref 6–8.4)
PROT UR QL STRIP: NEGATIVE
PULM VEIN S/D RATIO: 1.51
PV PEAK D VEL: 0.59 M/S
PV PEAK S VEL: 0.89 M/S
PV PEAK VELOCITY: 1.14 CM/S
RA MAJOR: 5.53 CM
RA PRESSURE: 3 MMHG
RBC # BLD AUTO: 2.36 M/UL (ref 4–5.4)
SODIUM SERPL-SCNC: 128 MMOL/L (ref 136–145)
SP GR UR STRIP: 1.01 (ref 1–1.03)
TDI LATERAL: 0.06 M/S
TDI SEPTAL: 0.07 M/S
TDI: 0.07 M/S
TR MAX PG: 45 MMHG
TV REST PULMONARY ARTERY PRESSURE: 48 MMHG
URN SPEC COLLECT METH UR: NORMAL
UROBILINOGEN UR STRIP-ACNC: NEGATIVE EU/DL
WBC # BLD AUTO: 5.41 K/UL (ref 3.9–12.7)

## 2022-12-24 PROCEDURE — 83735 ASSAY OF MAGNESIUM: CPT | Performed by: INTERNAL MEDICINE

## 2022-12-24 PROCEDURE — 83880 ASSAY OF NATRIURETIC PEPTIDE: CPT | Performed by: INTERNAL MEDICINE

## 2022-12-24 PROCEDURE — 94640 AIRWAY INHALATION TREATMENT: CPT

## 2022-12-24 PROCEDURE — 25000003 PHARM REV CODE 250: Performed by: INTERNAL MEDICINE

## 2022-12-24 PROCEDURE — 63600175 PHARM REV CODE 636 W HCPCS: Performed by: INTERNAL MEDICINE

## 2022-12-24 PROCEDURE — 94761 N-INVAS EAR/PLS OXIMETRY MLT: CPT

## 2022-12-24 PROCEDURE — 99231 SBSQ HOSP IP/OBS SF/LOW 25: CPT | Mod: ,,, | Performed by: STUDENT IN AN ORGANIZED HEALTH CARE EDUCATION/TRAINING PROGRAM

## 2022-12-24 PROCEDURE — 99291 CRITICAL CARE FIRST HOUR: CPT | Mod: ,,, | Performed by: INTERNAL MEDICINE

## 2022-12-24 PROCEDURE — 81003 URINALYSIS AUTO W/O SCOPE: CPT | Performed by: PHYSICIAN ASSISTANT

## 2022-12-24 PROCEDURE — 63600175 PHARM REV CODE 636 W HCPCS: Performed by: SURGERY

## 2022-12-24 PROCEDURE — A4216 STERILE WATER/SALINE, 10 ML: HCPCS | Performed by: INTERNAL MEDICINE

## 2022-12-24 PROCEDURE — 99223 PR INITIAL HOSPITAL CARE,LEVL III: ICD-10-PCS | Mod: GC,,, | Performed by: INTERNAL MEDICINE

## 2022-12-24 PROCEDURE — 99231 PR SUBSEQUENT HOSPITAL CARE,LEVL I: ICD-10-PCS | Mod: ,,, | Performed by: STUDENT IN AN ORGANIZED HEALTH CARE EDUCATION/TRAINING PROGRAM

## 2022-12-24 PROCEDURE — 85025 COMPLETE CBC W/AUTO DIFF WBC: CPT | Performed by: INTERNAL MEDICINE

## 2022-12-24 PROCEDURE — 99291 PR CRITICAL CARE, E/M 30-74 MINUTES: ICD-10-PCS | Mod: ,,, | Performed by: INTERNAL MEDICINE

## 2022-12-24 PROCEDURE — 25000003 PHARM REV CODE 250: Performed by: SURGERY

## 2022-12-24 PROCEDURE — 80053 COMPREHEN METABOLIC PANEL: CPT | Performed by: INTERNAL MEDICINE

## 2022-12-24 PROCEDURE — 99900035 HC TECH TIME PER 15 MIN (STAT)

## 2022-12-24 PROCEDURE — 25000242 PHARM REV CODE 250 ALT 637 W/ HCPCS: Performed by: INTERNAL MEDICINE

## 2022-12-24 PROCEDURE — 99223 1ST HOSP IP/OBS HIGH 75: CPT | Mod: GC,,, | Performed by: INTERNAL MEDICINE

## 2022-12-24 PROCEDURE — 20000000 HC ICU ROOM

## 2022-12-24 PROCEDURE — 27000221 HC OXYGEN, UP TO 24 HOURS

## 2022-12-24 PROCEDURE — 84100 ASSAY OF PHOSPHORUS: CPT | Performed by: INTERNAL MEDICINE

## 2022-12-24 RX ORDER — ENOXAPARIN SODIUM 100 MG/ML
40 INJECTION SUBCUTANEOUS EVERY 24 HOURS
Status: DISCONTINUED | OUTPATIENT
Start: 2022-12-24 | End: 2022-12-27 | Stop reason: HOSPADM

## 2022-12-24 RX ORDER — LEVALBUTEROL INHALATION SOLUTION 0.63 MG/3ML
0.63 SOLUTION RESPIRATORY (INHALATION) EVERY 8 HOURS
Status: DISCONTINUED | OUTPATIENT
Start: 2022-12-24 | End: 2022-12-27 | Stop reason: HOSPADM

## 2022-12-24 RX ORDER — CALCIUM GLUCONATE 20 MG/ML
1 INJECTION, SOLUTION INTRAVENOUS ONCE
Status: COMPLETED | OUTPATIENT
Start: 2022-12-24 | End: 2022-12-24

## 2022-12-24 RX ORDER — GUAIFENESIN 600 MG/1
600 TABLET, EXTENDED RELEASE ORAL 2 TIMES DAILY
Status: DISCONTINUED | OUTPATIENT
Start: 2022-12-24 | End: 2022-12-27 | Stop reason: HOSPADM

## 2022-12-24 RX ADMIN — SODIUM CHLORIDE, PRESERVATIVE FREE 10 ML: 5 INJECTION INTRAVENOUS at 05:12

## 2022-12-24 RX ADMIN — SODIUM CHLORIDE, PRESERVATIVE FREE 10 ML: 5 INJECTION INTRAVENOUS at 06:12

## 2022-12-24 RX ADMIN — PANTOPRAZOLE SODIUM 40 MG: 40 TABLET, DELAYED RELEASE ORAL at 08:12

## 2022-12-24 RX ADMIN — PIPERACILLIN AND TAZOBACTAM 4.5 G: 4; .5 INJECTION, POWDER, LYOPHILIZED, FOR SOLUTION INTRAVENOUS; PARENTERAL at 03:12

## 2022-12-24 RX ADMIN — HYDROCODONE BITARTRATE AND ACETAMINOPHEN 1 TABLET: 10; 325 TABLET ORAL at 11:12

## 2022-12-24 RX ADMIN — PIPERACILLIN AND TAZOBACTAM 4.5 G: 4; .5 INJECTION, POWDER, LYOPHILIZED, FOR SOLUTION INTRAVENOUS; PARENTERAL at 07:12

## 2022-12-24 RX ADMIN — POTASSIUM BICARBONATE 30 MEQ: 391 TABLET, EFFERVESCENT ORAL at 10:12

## 2022-12-24 RX ADMIN — ATORVASTATIN CALCIUM 40 MG: 20 TABLET, FILM COATED ORAL at 08:12

## 2022-12-24 RX ADMIN — CALCIUM GLUCONATE 1 G: 20 INJECTION, SOLUTION INTRAVENOUS at 04:12

## 2022-12-24 RX ADMIN — GABAPENTIN 200 MG: 100 CAPSULE ORAL at 08:12

## 2022-12-24 RX ADMIN — MUPIROCIN: 20 OINTMENT TOPICAL at 08:12

## 2022-12-24 RX ADMIN — DULOXETINE 60 MG: 30 CAPSULE, DELAYED RELEASE ORAL at 08:12

## 2022-12-24 RX ADMIN — GUAIFENESIN 600 MG: 600 TABLET, EXTENDED RELEASE ORAL at 11:12

## 2022-12-24 RX ADMIN — POTASSIUM BICARBONATE 30 MEQ: 391 TABLET, EFFERVESCENT ORAL at 07:12

## 2022-12-24 RX ADMIN — FLUTICASONE FUROATE AND VILANTEROL TRIFENATATE 1 PUFF: 100; 25 POWDER RESPIRATORY (INHALATION) at 09:12

## 2022-12-24 RX ADMIN — GABAPENTIN 200 MG: 100 CAPSULE ORAL at 03:12

## 2022-12-24 RX ADMIN — LEVALBUTEROL HYDROCHLORIDE 0.63 MG: 0.63 SOLUTION RESPIRATORY (INHALATION) at 08:12

## 2022-12-24 RX ADMIN — SODIUM CHLORIDE 500 ML: 0.9 INJECTION, SOLUTION INTRAVENOUS at 06:12

## 2022-12-24 RX ADMIN — PIPERACILLIN AND TAZOBACTAM 4.5 G: 4; .5 INJECTION, POWDER, LYOPHILIZED, FOR SOLUTION INTRAVENOUS; PARENTERAL at 10:12

## 2022-12-24 RX ADMIN — VASOPRESSIN 0.04 UNITS/MIN: 20 INJECTION, SOLUTION INTRAMUSCULAR; SUBCUTANEOUS at 08:12

## 2022-12-24 RX ADMIN — ENOXAPARIN SODIUM 40 MG: 40 INJECTION SUBCUTANEOUS at 04:12

## 2022-12-24 RX ADMIN — HYDROCODONE BITARTRATE AND ACETAMINOPHEN 1 TABLET: 10; 325 TABLET ORAL at 10:12

## 2022-12-24 RX ADMIN — TRAZODONE HYDROCHLORIDE 100 MG: 50 TABLET ORAL at 08:12

## 2022-12-24 RX ADMIN — AZELASTINE HYDROCHLORIDE 137 MCG: 137 SPRAY, METERED NASAL at 08:12

## 2022-12-24 RX ADMIN — SODIUM CHLORIDE, PRESERVATIVE FREE 10 ML: 5 INJECTION INTRAVENOUS at 12:12

## 2022-12-24 RX ADMIN — ACETAMINOPHEN 650 MG: 325 TABLET, FILM COATED ORAL at 08:12

## 2022-12-24 RX ADMIN — TIOTROPIUM BROMIDE INHALATION SPRAY 2 PUFF: 3.12 SPRAY, METERED RESPIRATORY (INHALATION) at 09:12

## 2022-12-24 NOTE — SUBJECTIVE & OBJECTIVE
No current facility-administered medications on file prior to encounter.     Current Outpatient Medications on File Prior to Encounter   Medication Sig    ALPRAZolam (XANAX) 0.25 MG tablet Take 1 tablet (0.25 mg total) by mouth daily as needed for Anxiety.    atorvastatin (LIPITOR) 40 MG tablet TAKE 1 TABLET BY MOUTH EVERY DAILY    azelastine (ASTELIN) 137 mcg (0.1 %) nasal spray Instill 1 spray (137 mcg total) by Nasal route 2 (two) times daily.    DULoxetine (CYMBALTA) 60 MG capsule Take 1 capsule (60 mg total) by mouth once daily.    fluticasone propion-salmeterol 115-21 mcg/dose (ADVAIR HFA) 115-21 mcg/actuation HFAA inhaler Inhale 2 puffs into the lungs every 12 (twelve) hours.    gabapentin (NEURONTIN) 300 MG capsule Take 1 capsule (300 mg total) by mouth 3 (three) times daily.    HYDROcodone-acetaminophen (NORCO)  mg per tablet Take 1 tablet by mouth every 12 (twelve) hours as needed for Pain.    losartan (COZAAR) 100 MG tablet Take 1 tablet (100 mg total) by mouth once daily.    metoprolol succinate (TOPROL-XL) 25 MG 24 hr tablet Take 2 tablets (50 mg total) by mouth once daily.    pantoprazole (PROTONIX) 40 MG tablet Take 1 tablet (40 mg total) by mouth once daily.    promethazine (PHENERGAN) 25 MG tablet Take 1 tablet (25 mg total) by mouth every 6 (six) hours as needed for Nausea.    torsemide (DEMADEX) 20 MG Tab Take 1 tablet (20 mg total) by mouth once daily.    traZODone (DESYREL) 100 MG tablet Take 1 tablet (100 mg total) by mouth every evening.    umeclidinium (INCRUSE ELLIPTA) 62.5 mcg/actuation inhalation capsule Inhale 1 puff into the lungs once daily. Controller    albuterol (VENTOLIN HFA) 90 mcg/actuation inhaler inhale 1-2 puffs as needed every 6 hours for wheezing and shortness of breath    ascorbic acid, vitamin C, (VITAMIN C) 250 MG tablet Take 250 mg by mouth once daily.    b complex vitamins capsule Take 1 capsule by mouth once daily.    biotin 10 mg Tab Take 10 mg by mouth once  daily.    calcium carbonate (OS-SANDRINE) 500 mg calcium (1,250 mg) tablet Take 2 tablets (1,000 mg total) by mouth 2 (two) times daily.    cyanocobalamin, vitamin B-12, 1,000 mcg TbSR Take 1,000 mcg by mouth once daily. (Patient not taking: Reported on 12/23/2022)    denosumab (PROLIA) 60 mg/mL Syrg Inject 1 mL (60 mg total) into the skin every 6 (six) months.    fluticasone (FLONASE) 50 mcg/actuation nasal spray 1 spray by Each Nare route 2 (two) times daily as needed for Rhinitis.    guaiFENesin (MUCINEX) 600 mg 12 hr tablet Take 1,200 mg by mouth 2 (two) times daily as needed for Congestion.    lutein 40 mg Cap Take by mouth.    magnesium oxide (MAG-OX) 400 mg (241.3 mg magnesium) tablet Take 1 tablet by mouth every 12 (twelve) hours. (Patient not taking: Reported on 12/23/2022)    multivit-min/iron/folic/lutein (CENTRUM SILVER WOMEN ORAL) Take 1 tablet by mouth once daily.    ondansetron (ZOFRAN-ODT) 8 MG TbDL dissolve 1 tablet (8 mg total) by mouth every 8 (eight) hours as needed (nausea). (Patient not taking: Reported on 12/23/2022)    vitamin D (VITAMIN D3) 1000 units Tab Take 1 tablet (1,000 Units total) by mouth once daily. (Patient not taking: Reported on 12/23/2022)    ZINC ORAL Take by mouth.       Review of patient's allergies indicates:   Allergen Reactions    Wellbutrin [bupropion hcl] Other (See Comments)     Hyponatremia and hypomagnesia     Zoloft [sertraline] Other (See Comments)     Hyponatremia and hypomagnesia     Bananas [banana]      Emesis, and stomach cramps    Patient states she is not allergic to Banana       Past Medical History:   Diagnosis Date    Anxiety     Cervical spinal stenosis     Choledocholithiasis     Chronic obstructive pulmonary disease 03/16/2016    Degenerative disc disease of cervical spine     Diverticulosis 04/24/2018    History of alcohol abuse 03/02/2021    History of gastric ulcer     History of L3 compression fracture 12/19/2018    History of lung cancer     s/p  completion of XRT in 10/2020    Hyperlipidemia     Iron deficiency anemia     Osteoarthritis     Stage III CKD 2017     Past Surgical History:   Procedure Laterality Date    BREAST CYST EXCISION Right     1967    CATARACT EXTRACTION      COLONOSCOPY  2015    COLONOSCOPY N/A 6/8/2022    Procedure: COLONOSCOPY;  Surgeon: Helio Cheema MD;  Location: [facility] (2ND FLR);  Service: Endoscopy;  Laterality: N/A;  5/25-Pt requesting Dr. Cheema-approval given per Dr. Cheema-ml  Fully vaccinated, prep instr portal -ml    CORONARY ANGIOGRAPHY N/A 7/20/2018    Procedure: ANGIOGRAM, CORONARY ARTERY;  Surgeon: Willard Roberts MD;  Location: [facility] CATH LAB;  Service: Cardiovascular;  Laterality: N/A;    ENDOSCOPIC ULTRASOUND OF UPPER GASTROINTESTINAL TRACT N/A 3/24/2021    Procedure: ULTRASOUND, UPPER GI TRACT, ENDOSCOPIC;  Surgeon: Helio Cheema MD;  Location: [facility] (2ND FLR);  Service: Endoscopy;  Laterality: N/A;    ENDOSCOPIC ULTRASOUND OF UPPER GASTROINTESTINAL TRACT N/A 4/25/2022    Procedure: ULTRASOUND, UPPER GI TRACT, ENDOSCOPIC;  Surgeon: Helio Cheema MD;  Location: [facility] (2ND FLR);  Service: Endoscopy;  Laterality: N/A;  cardiac risk assessment 1 per Dr. Ortez. see t/e 3/17-SC  3/25:new instructions via portal. home with medical transport?-SC    ERCP N/A 3/24/2021    Procedure: ERCP (ENDOSCOPIC RETROGRADE CHOLANGIOPANCREATOGRAPHY);  Surgeon: Helio Cheema MD;  Location: [facility] (2ND FLR);  Service: Endoscopy;  Laterality: N/A;    ERCP N/A 4/30/2021    Procedure: ERCP (ENDOSCOPIC RETROGRADE CHOLANGIOPANCREATOGRAPHY);  Surgeon: Boo Gray MD;  Location: [facility] ENDO (2ND FLR);  Service: Endoscopy;  Laterality: N/A;    ERCP N/A 7/1/2021    Procedure: ERCP (ENDOSCOPIC RETROGRADE CHOLANGIOPANCREATOGRAPHY);  Surgeon: Helio Cheema MD;  Location: [facility] ENDO (2ND FLR);  Service: Endoscopy;  Laterality: N/A;  Ok for Taxi. Dr Cheema  rapid covid 1130am- tb inst email     ESOPHAGOGASTRODUODENOSCOPY  2015    ESOPHAGOGASTRODUODENOSCOPY N/A 3/4/2021    Procedure: EGD (ESOPHAGOGASTRODUODENOSCOPY);  Surgeon: Yenifer Ramirez MD;  Location: [facility];  Service: Endoscopy;  Laterality: N/A;    ESOPHAGOGASTRODUODENOSCOPY N/A 3/24/2021    Procedure: EGD (ESOPHAGOGASTRODUODENOSCOPY);  Surgeon: Helio Cheema MD;  [facility]  Service: Endoscopy;  Laterality: N/A;    EYE SURGERY  2016    Cataracts    FRACTURE SURGERY  2014    JOINT REPLACEMENT  2007    ORIF FEMUR FRACTURE Left     TOTAL KNEE ARTHROPLASTY Left 2007    TUBAL LIGATION       Family History       Problem Relation (Age of Onset)    Alzheimer's disease Mother    Arthritis Father, Brother    Cancer Sister    Colon cancer Brother    Dementia Mother    Depression Mother    Hypertension Mother, Brother    Miscarriages / Stillbirths Sister    Myasthenia gravis Father    No Known Problems Son    Rectal cancer Father          Tobacco Use    Smoking status: Some Days     Packs/day: 1.00     Years: 53.00     Pack years: 53.00     Types: Cigarettes     Start date: 4/30/1967    Smokeless tobacco: Never    Tobacco comments:     I had quit for about 6 months this past year. Then massive stress and started back up sometimes   Substance and Sexual Activity    Alcohol use: Yes     Alcohol/week: 7.0 standard drinks     Types: 7 Drinks containing 0.5 oz of alcohol per week     Comment: at least 1 cocktail a day    Drug use: No    Sexual activity: Not Currently     Partners: Male     Birth control/protection: Abstinence, Post-menopausal     Review of Systems   Constitutional:  Negative for chills and fever.   HENT:  Negative for sore throat.    Gastrointestinal:  Positive for diarrhea and nausea. Negative for abdominal distention, abdominal pain and vomiting.   Genitourinary:  Positive for decreased urine volume. Negative for dysuria.   Objective:     Vital Signs (Most Recent):  Temp: 98.1 °F (36.7 °C) (12/23/22 1445)  Pulse: 77  (12/23/22 1745)  Resp: (!) 23 (12/23/22 1745)  BP: (!) 111/59 (12/23/22 1745)  SpO2: (!) 86 % (12/23/22 1745)   Vital Signs (24h Range):  Temp:  [98.1 °F (36.7 °C)-98.9 °F (37.2 °C)] 98.1 °F (36.7 °C)  Pulse:  [76-93] 77  Resp:  [12-37] 23  SpO2:  [72 %-100 %] 86 %  BP: ()/(28-87) 111/59     Weight: 95.8 kg (211 lb 3.2 oz)  Body mass index is 34.09 kg/m².    Physical Exam  Vitals reviewed.   Constitutional:       General: She is not in acute distress.  HENT:      Nose: No rhinorrhea.      Mouth/Throat:      Mouth: Mucous membranes are dry.   Eyes:      General: No scleral icterus.  Cardiovascular:      Rate and Rhythm: Normal rate.   Pulmonary:      Effort: Pulmonary effort is normal. No respiratory distress.   Abdominal:      General: There is no distension.      Palpations: Abdomen is soft.      Tenderness: There is abdominal tenderness (mild bilatearl lower quadrant tenderness with deep palpation). There is no guarding or rebound.   Skin:     General: Skin is warm and dry.   Neurological:      Mental Status: She is alert.   Psychiatric:         Mood and Affect: Mood normal.         Behavior: Behavior normal.       Significant Labs:  I have reviewed all pertinent lab results within the past 24 hours.  CBC:   Recent Labs   Lab 12/23/22  1015 12/23/22  1028   WBC 7.65  --    RBC 2.35*  --    HGB 7.6*  --    HCT 22.8* 23*   *  --    MCV 97  --    MCH 32.3*  --    MCHC 33.3  --      BMP:   Recent Labs   Lab 12/23/22  1015   GLU 86   *   K 3.7   CL 98   CO2 20*   BUN 39*   CREATININE 2.1*   CALCIUM 6.9*   MG 1.0*     CMP:   Recent Labs   Lab 12/23/22  1015   GLU 86   CALCIUM 6.9*   ALBUMIN 2.2*   PROT 5.2*   *   K 3.7   CO2 20*   CL 98   BUN 39*   CREATININE 2.1*   ALKPHOS 87   ALT 23   AST 27   BILITOT 0.4     LFTs:   Recent Labs   Lab 12/23/22  1015   ALT 23   AST 27   ALKPHOS 87   BILITOT 0.4   PROT 5.2*   ALBUMIN 2.2*     Coagulation: No results for input(s): LABPROT, INR, APTT in the last  168 hours.  ABGs: No results for input(s): PH, PCO2, PO2, HCO3, POCSATURATED, BE in the last 168 hours.  No results for input(s): COLORU, CLARITYU, SPECGRAV, PHUR, PROTEINUA, GLUCOSEU, BILIRUBINCON, BLOODU, WBCU, RBCU, BACTERIA, MUCUS, NITRITE, LEUKOCYTESUR, UROBILINOGEN, HYALINECASTS in the last 168 hours.    Significant Diagnostics:  I have reviewed all pertinent imaging results/findings within the past 24 hours.  I have reviewed and interpreted all pertinent imaging results/findings within the past 24 hours.  CT: I have reviewed all pertinent results/findings within the past 24 hours and my personal findings are:  limited noncontrast CT shows air fluid level in left lower quadrant - unclear if this is a diverticulum or adjacent fluid collection though notably there is no free fluid in the pelvis and no pneumoperitoneum or pneumatosis

## 2022-12-24 NOTE — ASSESSMENT & PLAN NOTE
Chronic hypoxemic resp failure  - Continue controller with fluticasone-vilanterol 100-25mcg inhaled daily, tiotropium 5mcg inhaled daily. Start albuterol-ipratropium 2.5-0.5mg inhaled q4hr PRN.  - Continue supplemental O2; uses 2L at baseline.

## 2022-12-24 NOTE — ASSESSMENT & PLAN NOTE
- Shock presumed septic in setting of ?intraabdominal fluid collection / loose stool / hypotension not responsive to fluids alone.  - Continue piperacillin-tazobactam 4.5g IV q8hr with suspected intra-abdominal source.  - Check C diff, stool studies.  - Continue norepinephrine gtt; wean as feasible. Additional fluid resuscitation.  - If persistently requiring pressor support will consult pulm/crit for assistance with shock.  - Surgery consulted in ED; appreciate assistance. Conservative management, no current indications for operative intervention.

## 2022-12-24 NOTE — SUBJECTIVE & OBJECTIVE
Interval History: No acute events overnight. Despite additional IVFs continued hypotension requiring norepinephrine. Abdominal pain resolved, no BM overnight. No other concerns at this time.    Review of Systems   Constitutional:  Negative for chills and fever.   Respiratory:  Negative for cough and shortness of breath.    Cardiovascular:  Negative for chest pain and palpitations.   Gastrointestinal:  Negative for abdominal pain, nausea and vomiting.   Objective:     Vital Signs (Most Recent):  Temp: 98.7 °F (37.1 °C) (12/24/22 1501)  Pulse: 88 (12/24/22 1501)  Resp: 20 (12/24/22 1501)  BP: (!) 107/53 (12/24/22 1501)  SpO2: 99 % (12/24/22 1501)   Vital Signs (24h Range):  Temp:  [97.8 °F (36.6 °C)-99 °F (37.2 °C)] 98.7 °F (37.1 °C)  Pulse:  [] 88  Resp:  [11-75] 20  SpO2:  [72 %-100 %] 99 %  BP: ()/(43-67) 107/53     Weight: 95.8 kg (211 lb 3.2 oz)  Body mass index is 34.09 kg/m².    Intake/Output Summary (Last 24 hours) at 12/24/2022 1645  Last data filed at 12/24/2022 1505  Gross per 24 hour   Intake 2286.52 ml   Output 1400 ml   Net 886.52 ml      Physical Exam  Vitals and nursing note reviewed.   Constitutional:       General: She is not in acute distress.     Appearance: She is well-developed.   HENT:      Head: Normocephalic and atraumatic.   Eyes:      General:         Right eye: No discharge.         Left eye: No discharge.      Conjunctiva/sclera: Conjunctivae normal.   Cardiovascular:      Rate and Rhythm: Normal rate.      Pulses: Normal pulses.   Pulmonary:      Effort: Pulmonary effort is normal. No respiratory distress.      Breath sounds: Wheezing present.   Abdominal:      Palpations: Abdomen is soft.      Tenderness: There is no abdominal tenderness.   Musculoskeletal:         General: Normal range of motion.      Right lower leg: No edema.      Left lower leg: No edema.   Skin:     General: Skin is warm and dry.   Neurological:      Mental Status: She is alert and oriented to person,  place, and time.     Significant Labs:   CBC:  Recent Labs   Lab 12/23/22  0744 12/23/22  1015 12/23/22  1028 12/24/22  0321   WBC 8.54 7.65  --  5.41   HGB 8.1* 7.6*  --  7.6*   HCT 24.7* 22.8* 23* 22.5*   * 138*  --  128*   GRAN 6.2 70.0  5.4  --  67.2  3.6   LYMPH  --  7.1*  0.5*  --  8.7*  0.5*   MONO  --  21.7*  1.7*  --  23.1*  1.3*   EOS  --  0.0  --  0.0   BASO  --  0.02  --  0.01   CMP:  Recent Labs   Lab 12/23/22  0744 12/23/22  1015 12/24/22  0321   * 129* 128*   K 4.6 3.7 3.4*   CL 97 98 100   CO2 18* 20* 21*   BUN 39* 39* 37*   CREATININE 2.2* 2.1* 1.6*   GLU 86 86 96   CALCIUM 7.7* 6.9* 6.5*   MG 1.0* 1.0* 1.6   PHOS  --  3.5 3.3   ALKPHOS 93 87 92   AST 31 27 24   ALT 24 23 21   BILITOT 0.4 0.4 0.8   PROT 5.7* 5.2* 4.7*   ALBUMIN 2.4* 2.2* 1.9*   ANIONGAP 13 11 7*     Recent Labs   Lab 12/23/22  1015 12/23/22  1614   LACTATE 3.8* 0.9     Significant Imaging:   No new imaging this morning.

## 2022-12-24 NOTE — HPI
72 y.o. woman with a history of recurrent and possibly metastatic lung adenocarcinoma on chemotherapy and radiation therapy, COPD on home oxygen, tobacco use, hypertension, congestive heart failure (EF 38%, mild aortic valve stenosis), chronic kidney disease stage 3, diverticulosis and chronic pain presenting with hypotension from Heme/Onc clinic and watery diarrhea associated with decreased urine output.  She reports nausea without vomiting, large volume diarrhea and recent colonoscopy about 3 months ago that showed diverticulosis and a sessile polyp that was a tubular adenoma without high grade dysplasia.  She denies abdominal pain, fever, chills or dysuria.  She reports the diarrhea started after IV magnesium treatment.

## 2022-12-24 NOTE — ASSESSMENT & PLAN NOTE
- Anemia in setting of malignancy / chemo / XRT. Patient reports darker stool, though was occult negative from stool produced in ED.  - Discussed with oncology; transfuse 1U pRBCs with goal Hgb > 8.  - Trend Hgb.

## 2022-12-24 NOTE — NURSING
Unable to transfused the RBC's. Bar codes were not working. Blood bank notified. Unit of blood discarded. MD notified.

## 2022-12-24 NOTE — PLAN OF CARE
PCP Yane Sahni  Pharmacy 1)[facility] 2)[facility] [place]     Lives alone - reports independence in ADLs with DME - will need transportation home        12/24/22 1359   Discharge Assessment   Assessment Type Discharge Planning Assessment   Confirmed/corrected address, phone number and insurance Yes   Confirmed Demographics Correct on Facesheet   Source of Information patient   People in Home alone   Do you expect to return to your current living situation? Yes   Prior to hospitilization cognitive status: Alert/Oriented   Current cognitive status: Alert/Oriented   Walking or Climbing Stairs ambulation difficulty, requires equipment;transferring difficulty, requires equipment   Dressing/Bathing bathing difficulty, requires equipment   Equipment Currently Used at Home oxygen;wheelchair;rollator;bath bench   Readmission within 30 days? No   Do you currently have service(s) that help you manage your care at home? No   Do you take prescription medications? Yes   Do you have prescription coverage? Yes   Do you have any problems affording any of your prescribed medications? No   Is the patient taking medications as prescribed? yes   Who is going to help you get home at discharge? needs ride   Are you on dialysis? No   Discharge Plan A Home   DME Needed Upon Discharge  none   Discharge Plan discussed with: Patient   Discharge Barriers Identified None

## 2022-12-24 NOTE — ASSESSMENT & PLAN NOTE
Pt presents with shock from clinic, asymptomatic. Recent diarrheal illness she associated with chemo+magnesium replacement. Unclear etiology at this time w/ adequate fluid resuscitation.  - Pressors    - currently on levophed .03 with adequate pressures    - will continue to wean as tolerated  - Sepsis    - pt w/ diarrheal illness, studies pending including c. Diff w/ decreased liquid stooling    - initial LA 3.9, now down to 1    - cultures pending  - Hypovolemic    - resuscitated with IVFs + `1 unit pRBC for possible hemorrhagic causes    - no evidence of GI bleed or other blood loss    - possible fluid down 2/2 to diarrhea with JONATHAN that resolved with fluids  - Cardiogenic    - previous TTE 38% in May 2021    - appears to be perfusing will with mild LE edema    - will repeat TTE today as she is s/p chemo and radiation since her last echo

## 2022-12-24 NOTE — ASSESSMENT & PLAN NOTE
CKDIII  - JONATHAN likely secondary to GI losses / dehydration.  - IVFs as feasible; if fails to respond will check urine lytes / renal U/S to further evaluate.

## 2022-12-24 NOTE — PLAN OF CARE
Problem: Adult Inpatient Plan of Care  Goal: Plan of Care Review  Outcome: Ongoing, Progressing  Goal: Absence of Hospital-Acquired Illness or Injury  Outcome: Ongoing, Progressing  Goal: Optimal Comfort and Wellbeing  Outcome: Ongoing, Progressing  Goal: Readiness for Transition of Care  Outcome: Ongoing, Progressing     Problem: Coping Ineffective  Goal: Effective Coping  Outcome: Ongoing, Progressing     Problem: Adjustment to Illness (Sepsis/Septic Shock)  Goal: Optimal Coping  Outcome: Ongoing, Progressing     Problem: Bleeding (Sepsis/Septic Shock)  Goal: Absence of Bleeding  Outcome: Ongoing, Progressing     Problem: Glycemic Control Impaired (Sepsis/Septic Shock)  Goal: Blood Glucose Level Within Desired Range  Outcome: Ongoing, Progressing     Problem: Infection Progression (Sepsis/Septic Shock)  Goal: Absence of Infection Signs and Symptoms  Outcome: Ongoing, Progressing     Problem: Nutrition Impaired (Sepsis/Septic Shock)  Goal: Optimal Nutrition Intake  Outcome: Ongoing, Progressing     Problem: Fluid and Electrolyte Imbalance (Acute Kidney Injury/Impairment)  Goal: Fluid and Electrolyte Balance  Outcome: Ongoing, Progressing     Problem: Oral Intake Inadequate (Acute Kidney Injury/Impairment)  Goal: Optimal Nutrition Intake  Outcome: Ongoing, Progressing     Problem: Renal Function Impairment (Acute Kidney Injury/Impairment)  Goal: Effective Renal Function  Outcome: Ongoing, Progressing     Problem: Infection  Goal: Absence of Infection Signs and Symptoms  Outcome: Ongoing, Progressing     Problem: Impaired Wound Healing  Goal: Optimal Wound Healing  Outcome: Ongoing, Progressing     Problem: Fall Injury Risk  Goal: Absence of Fall and Fall-Related Injury  Outcome: Ongoing, Progressing     Problem: Skin Injury Risk Increased  Goal: Skin Health and Integrity  Outcome: Ongoing, Progressing

## 2022-12-24 NOTE — ASSESSMENT & PLAN NOTE
- Lung adenocarcinoma with recurrence undergoing current XRT/chemo with Dr. Gilmore.  - Palliative consulted, appreciate assistance.

## 2022-12-24 NOTE — ASSESSMENT & PLAN NOTE
- Lung adenocarcinoma with recurrence undergoing current XRT/chemo with Dr. Gilmore.  - Palliative consult for assistance with goals of care discussions.

## 2022-12-24 NOTE — ASSESSMENT & PLAN NOTE
- Anemia in setting of malignancy / chemo / XRT. Patient reports darker stool, though was occult negative from stool produced in ED.  - Transfused 1U pRBCs 12/23; not significantly changed, but no evidence of acute bleeding or symptoms of anemia at this time.  - Trend Hgb.

## 2022-12-24 NOTE — CONSULTS
[facility] Intensive Care [facility])  General Surgery  Consult Note    Patient Name: Nalini Varma  MRN: [id number]  Code Status: Full Code  Admission Date: 12/23/2022  Hospital Length of Stay: 0 days  Attending Physician: AMBER Austin MD  Primary Care Provider: Yane Sahni MD    Patient information was obtained from patient, past medical records and ER records.     Inpatient consult to General surgery  Consult performed by: Caitlin Dillon MD  Consult ordered by: Karel Ching PA-C  Reason for consult: diverticulitis vs sigmoid abscess        Subjective:     Principal Problem: Septic shock    History of Present Illness: 72 y.o. woman with a history of recurrent and possibly metastatic lung adenocarcinoma on chemotherapy and radiation therapy, COPD on home oxygen, tobacco use, hypertension, congestive heart failure (EF 38%, mild aortic valve stenosis), chronic kidney disease stage 3, diverticulosis and chronic pain presenting with hypotension from Heme/Onc clinic and watery diarrhea associated with decreased urine output.  She reports nausea without vomiting, large volume diarrhea and recent colonoscopy about 3 months ago that showed diverticulosis and a sessile polyp that was a tubular adenoma without high grade dysplasia.  She denies abdominal pain, fever, chills or dysuria.  She reports the diarrhea started after IV magnesium treatment.       No current facility-administered medications on file prior to encounter.     Current Outpatient Medications on File Prior to Encounter   Medication Sig    ALPRAZolam (XANAX) 0.25 MG tablet Take 1 tablet (0.25 mg total) by mouth daily as needed for Anxiety.    atorvastatin (LIPITOR) 40 MG tablet TAKE 1 TABLET BY MOUTH EVERY DAILY    azelastine (ASTELIN) 137 mcg (0.1 %) nasal spray Instill 1 spray (137 mcg total) by Nasal route 2 (two) times daily.    DULoxetine (CYMBALTA) 60 MG capsule Take 1 capsule (60 mg total) by mouth once daily.    fluticasone  propion-salmeterol 115-21 mcg/dose (ADVAIR HFA) 115-21 mcg/actuation HFAA inhaler Inhale 2 puffs into the lungs every 12 (twelve) hours.    gabapentin (NEURONTIN) 300 MG capsule Take 1 capsule (300 mg total) by mouth 3 (three) times daily.    HYDROcodone-acetaminophen (NORCO)  mg per tablet Take 1 tablet by mouth every 12 (twelve) hours as needed for Pain.    losartan (COZAAR) 100 MG tablet Take 1 tablet (100 mg total) by mouth once daily.    metoprolol succinate (TOPROL-XL) 25 MG 24 hr tablet Take 2 tablets (50 mg total) by mouth once daily.    pantoprazole (PROTONIX) 40 MG tablet Take 1 tablet (40 mg total) by mouth once daily.    promethazine (PHENERGAN) 25 MG tablet Take 1 tablet (25 mg total) by mouth every 6 (six) hours as needed for Nausea.    torsemide (DEMADEX) 20 MG Tab Take 1 tablet (20 mg total) by mouth once daily.    traZODone (DESYREL) 100 MG tablet Take 1 tablet (100 mg total) by mouth every evening.    umeclidinium (INCRUSE ELLIPTA) 62.5 mcg/actuation inhalation capsule Inhale 1 puff into the lungs once daily. Controller    albuterol (VENTOLIN HFA) 90 mcg/actuation inhaler inhale 1-2 puffs as needed every 6 hours for wheezing and shortness of breath    ascorbic acid, vitamin C, (VITAMIN C) 250 MG tablet Take 250 mg by mouth once daily.    b complex vitamins capsule Take 1 capsule by mouth once daily.    biotin 10 mg Tab Take 10 mg by mouth once daily.    calcium carbonate (OS-SANDRINE) 500 mg calcium (1,250 mg) tablet Take 2 tablets (1,000 mg total) by mouth 2 (two) times daily.    cyanocobalamin, vitamin B-12, 1,000 mcg TbSR Take 1,000 mcg by mouth once daily. (Patient not taking: Reported on 12/23/2022)    denosumab (PROLIA) 60 mg/mL Syrg Inject 1 mL (60 mg total) into the skin every 6 (six) months.    fluticasone (FLONASE) 50 mcg/actuation nasal spray 1 spray by Each Nare route 2 (two) times daily as needed for Rhinitis.    guaiFENesin (MUCINEX) 600 mg 12 hr tablet Take 1,200  mg by mouth 2 (two) times daily as needed for Congestion.    lutein 40 mg Cap Take by mouth.    magnesium oxide (MAG-OX) 400 mg (241.3 mg magnesium) tablet Take 1 tablet by mouth every 12 (twelve) hours. (Patient not taking: Reported on 12/23/2022)    multivit-min/iron/folic/lutein (CENTRUM SILVER WOMEN ORAL) Take 1 tablet by mouth once daily.    ondansetron (ZOFRAN-ODT) 8 MG TbDL dissolve 1 tablet (8 mg total) by mouth every 8 (eight) hours as needed (nausea). (Patient not taking: Reported on 12/23/2022)    vitamin D (VITAMIN D3) 1000 units Tab Take 1 tablet (1,000 Units total) by mouth once daily. (Patient not taking: Reported on 12/23/2022)    ZINC ORAL Take by mouth.       Review of patient's allergies indicates:   Allergen Reactions    Wellbutrin [bupropion hcl] Other (See Comments)     Hyponatremia and hypomagnesia     Zoloft [sertraline] Other (See Comments)     Hyponatremia and hypomagnesia     Bananas [banana]      Emesis, and stomach cramps    Patient states she is not allergic to Banana       Past Medical History:   Diagnosis Date    Anxiety     Cervical spinal stenosis     Choledocholithiasis     Chronic obstructive pulmonary disease 03/16/2016    Degenerative disc disease of cervical spine     Diverticulosis 04/24/2018    History of alcohol abuse 03/02/2021    History of gastric ulcer     History of L3 compression fracture 12/19/2018    History of lung cancer     s/p completion of XRT in 10/2020    Hyperlipidemia     Iron deficiency anemia     Osteoarthritis     Stage III CKD 2017     Past Surgical History:   Procedure Laterality Date    BREAST CYST EXCISION Right     1967    CATARACT EXTRACTION      COLONOSCOPY  2015    COLONOSCOPY N/A 6/8/2022    Procedure: COLONOSCOPY;  Surgeon: Helio Cheema MD;  Location: [facility] ([address] [place] [address]);  Service: Endoscopy;  Laterality: N/A;  5/25-Pt requesting Dr. Cheema-approval given per Dr. Cheema-ml  Fully vaccinated, prep instr portal -ml     CORONARY ANGIOGRAPHY N/A 7/20/2018    Procedure: ANGIOGRAM, CORONARY ARTERY;  Surgeon: Willard Roberts MD;  Location: [facility] CATH LAB;  Service: Cardiovascular;  Laterality: N/A;    ENDOSCOPIC ULTRASOUND OF UPPER GASTROINTESTINAL TRACT N/A 3/24/2021    Procedure: ULTRASOUND, UPPER GI TRACT, ENDOSCOPIC;  Surgeon: Helio Cheema MD;  Location: [facility] ENDO (2ND FLR);  Service: Endoscopy;  Laterality: N/A;    ENDOSCOPIC ULTRASOUND OF UPPER GASTROINTESTINAL TRACT N/A 4/25/2022    Procedure: ULTRASOUND, UPPER GI TRACT, ENDOSCOPIC;  Surgeon: Helio Cheema MD;  Location: [facility] ENDO (2ND FLR);  Service: Endoscopy;  Laterality: N/A;  cardiac risk assessment 1 per Dr. Ortez. see t/e 3/17-SC  3/25:new instructions via portal. home with medical transport?-SC    ERCP N/A 3/24/2021    Procedure: ERCP (ENDOSCOPIC RETROGRADE CHOLANGIOPANCREATOGRAPHY);  Surgeon: Helio Cheema MD;  Location: [facility] (2ND FLR);  Service: Endoscopy;  Laterality: N/A;    ERCP N/A 4/30/2021    Procedure: ERCP (ENDOSCOPIC RETROGRADE CHOLANGIOPANCREATOGRAPHY);  Surgeon: Boo Gray MD;  Location: [facility] ENDO (2ND FLR);  Service: Endoscopy;  Laterality: N/A;    ERCP N/A 7/1/2021    Procedure: ERCP (ENDOSCOPIC RETROGRADE CHOLANGIOPANCREATOGRAPHY);  Surgeon: Helio Cheema MD;  Location: [facility] ENDO (2ND FLR);  Service: Endoscopy;  Laterality: N/A;  Ok for Taxi. Dr Cheema  rapid covid 1130am- tb inst email    ESOPHAGOGASTRODUODENOSCOPY  2015    ESOPHAGOGASTRODUODENOSCOPY N/A 3/4/2021    Procedure: EGD (ESOPHAGOGASTRODUODENOSCOPY);  Surgeon: Yenifer Ramirez MD;  Location: [facility];  Service: Endoscopy;  Laterality: N/A;    ESOPHAGOGASTRODUODENOSCOPY N/A 3/24/2021    Procedure: EGD (ESOPHAGOGASTRODUODENOSCOPY);  Surgeon: Helio Cheema MD;  Location: [facility] ENDO (2ND FLR);  Service: Endoscopy;  Laterality: N/A;    EYE SURGERY  2016    Cataracts    FRACTURE SURGERY  2014    JOINT REPLACEMENT  2007    ORIF FEMUR FRACTURE  Left     TOTAL KNEE ARTHROPLASTY Left 2007    TUBAL LIGATION       Family History       Problem Relation (Age of Onset)    Alzheimer's disease Mother    Arthritis Father, Brother    Cancer Sister    Colon cancer Brother    Dementia Mother    Depression Mother    Hypertension Mother, Brother    Miscarriages / Stillbirths Sister    Myasthenia gravis Father    No Known Problems Son    Rectal cancer Father          Tobacco Use    Smoking status: Some Days     Packs/day: 1.00     Years: 53.00     Pack years: 53.00     Types: Cigarettes     Start date: 4/30/1967    Smokeless tobacco: Never    Tobacco comments:     I had quit for about 6 months this past year. Then massive stress and started back up sometimes   Substance and Sexual Activity    Alcohol use: Yes     Alcohol/week: 7.0 standard drinks     Types: 7 Drinks containing 0.5 oz of alcohol per week     Comment: at least 1 cocktail a day    Drug use: No    Sexual activity: Not Currently     Partners: Male     Birth control/protection: Abstinence, Post-menopausal     Review of Systems   Constitutional:  Negative for chills and fever.   HENT:  Negative for sore throat.    Gastrointestinal:  Positive for diarrhea and nausea. Negative for abdominal distention, abdominal pain and vomiting.   Genitourinary:  Positive for decreased urine volume. Negative for dysuria.   Objective:     Vital Signs (Most Recent):  Temp: 98.1 °F (36.7 °C) (12/23/22 1445)  Pulse: 77 (12/23/22 1745)  Resp: (!) 23 (12/23/22 1745)  BP: (!) 111/59 (12/23/22 1745)  SpO2: (!) 86 % (12/23/22 1745)   Vital Signs (24h Range):  Temp:  [98.1 °F (36.7 °C)-98.9 °F (37.2 °C)] 98.1 °F (36.7 °C)  Pulse:  [76-93] 77  Resp:  [12-37] 23  SpO2:  [72 %-100 %] 86 %  BP: ()/(28-87) 111/59     Weight: 95.8 kg (211 lb 3.2 oz)  Body mass index is 34.09 kg/m².    Physical Exam  Vitals reviewed.   Constitutional:       General: She is not in acute distress.  HENT:      Nose: No rhinorrhea.      Mouth/Throat:       Mouth: Mucous membranes are dry.   Eyes:      General: No scleral icterus.  Cardiovascular:      Rate and Rhythm: Normal rate.   Pulmonary:      Effort: Pulmonary effort is normal. No respiratory distress.   Abdominal:      General: There is no distension.      Palpations: Abdomen is soft.      Tenderness: There is abdominal tenderness (mild bilatearl lower quadrant tenderness with deep palpation). There is no guarding or rebound.   Skin:     General: Skin is warm and dry.   Neurological:      Mental Status: She is alert.   Psychiatric:         Mood and Affect: Mood normal.         Behavior: Behavior normal.       Significant Labs:  I have reviewed all pertinent lab results within the past 24 hours.  CBC:   Recent Labs   Lab 12/23/22  1015 12/23/22  1028   WBC 7.65  --    RBC 2.35*  --    HGB 7.6*  --    HCT 22.8* 23*   *  --    MCV 97  --    MCH 32.3*  --    MCHC 33.3  --      BMP:   Recent Labs   Lab 12/23/22  1015   GLU 86   *   K 3.7   CL 98   CO2 20*   BUN 39*   CREATININE 2.1*   CALCIUM 6.9*   MG 1.0*     CMP:   Recent Labs   Lab 12/23/22  1015   GLU 86   CALCIUM 6.9*   ALBUMIN 2.2*   PROT 5.2*   *   K 3.7   CO2 20*   CL 98   BUN 39*   CREATININE 2.1*   ALKPHOS 87   ALT 23   AST 27   BILITOT 0.4     LFTs:   Recent Labs   Lab 12/23/22  1015   ALT 23   AST 27   ALKPHOS 87   BILITOT 0.4   PROT 5.2*   ALBUMIN 2.2*     Coagulation: No results for input(s): LABPROT, INR, APTT in the last 168 hours.  ABGs: No results for input(s): PH, PCO2, PO2, HCO3, POCSATURATED, BE in the last 168 hours.  No results for input(s): COLORU, CLARITYU, SPECGRAV, PHUR, PROTEINUA, GLUCOSEU, BILIRUBINCON, BLOODU, WBCU, RBCU, BACTERIA, MUCUS, NITRITE, LEUKOCYTESUR, UROBILINOGEN, HYALINECASTS in the last 168 hours.    Significant Diagnostics:  I have reviewed all pertinent imaging results/findings within the past 24 hours.  I have reviewed and interpreted all pertinent imaging results/findings within the past 24  hours.  CT: I have reviewed all pertinent results/findings within the past 24 hours and my personal findings are:  limited noncontrast CT shows air fluid level in left lower quadrant - unclear if this is a diverticulum or adjacent fluid collection though notably there is no free fluid in the pelvis and no pneumoperitoneum or pneumatosis      Assessment/Plan:     * Septic shock  Suspect hypovolemic shock and hypotension from massive diarrheal fluid losses, possibly secondary to magnesium therapy.  Finding of left lower quadrant air fluid level contained within a sigmoid diverticulum versus a pericolonic abscess from perforated sigmoid diverticulitis.  Patient is afebrile without leukocytosis or shift, though chemotherapy may blunt immune response and patient does not have significant tenderness or peritonitis.  Higher risk for complications due to recurrent and possibly metastatic lung adenocarcinoma on chemotherapy and radiation therapy, COPD on home oxygen, tobacco use and chronic kidney disease.      Recommendations:  - Continue IV fluid resuscitation, wean vasoactive agents as able to  - Strict I/O  - OK for clear liquid diet as tolerated, back down to NPO if worsening abdominal pain  - Reasonable to continue IV antibiotics for possible perforated sigmoid diverticulitis  - Monitor fever trend/CBC  - Continue to monitor abdominal examination  - Ideally would repeat CT scan of the abdomen and pelvis with IV and rectal contrast, trend creatinine to avoid further nephrotoxicity      VTE Risk Mitigation (From admission, onward)         Ordered     Reason for No Pharmacological VTE Prophylaxis  Once        Question:  Reasons:  Answer:  Risk of Bleeding    12/23/22 1609     IP VTE HIGH RISK PATIENT  Once         12/23/22 1609     Place sequential compression device  Until discontinued         12/23/22 1609                Thank you for your consult.      Caitlin Dillon MD  General Surgery  [facility] - Intensive Care  ([place])

## 2022-12-24 NOTE — PROGRESS NOTES
[facility] - Intensive Care [facility] Medicine  Progress Note    Patient Name: Nalini Varma  MRN: [id number]  Patient Class: IP- Inpatient   Admission Date: 12/23/2022  Length of Stay: 1 days  Attending Physician: AMBER Austin MD  Primary Care Provider: Yane Sahni MD        Subjective:     Principal Problem:Shock, unspecified        HPI:  Ms. Varma is a 72/F with PMH COPD, chronic hypoxemic respiratory failure (2L O2 via NC), HFrEF (ECHO 05/2021 EF 38%), HTN, HLD, anxiety, CKDIII, recurrent adenocarcinoma of left lung on chemo/XRT, SIADH, anemia who [facility] 12/23 from Oncology Clinic with hypotension.  She reports she has been feeling slightly weaker than usual and has noticed increased loose stool over the past week with brown/black coloration darker than usual.  She notes at baseline she has some loose stool but reports has increased to 2-3x/daily and has mild abdominal tenderness. Denies fevers, chills, CP, palpitations; does note chronic SOB but no significant increase and reports using her oxygen at 2-3L at home.  Upon presentation to Oncology Clinic she was noted to have significantly low blood pressure with BP 68/39 initially.  On repeat evaluation he remained low and she was sent to ED for further evaluation.  ED workup was notable for mild hyponatremia, hypomagnesemia, JONATHAN with creatinine 2.2 (baseline 1.2), and decrease in Hgb to 7.6 (baseline high 8s-9s).  CXR showed L-sided pleural effusion; CT Abd/Pelvis showed air/fluid collection in pelvis unclear if representing perisigmoid abscess vs large diverticulum. She received IV hydration with 600mL NS in total without significant improvement in pressures. She was started on norepinephrine gtt and received piperacillin-tazobactam. Surgery was consulted and hospital medicine contacted for admission.      Overview/Hospital Course:  No notes on file    Interval History: No acute events overnight. Despite additional IVFs  continued hypotension requiring norepinephrine. Abdominal pain resolved, no BM overnight. No other concerns at this time.    Review of Systems   Constitutional:  Negative for chills and fever.   Respiratory:  Negative for cough and shortness of breath.    Cardiovascular:  Negative for chest pain and palpitations.   Gastrointestinal:  Negative for abdominal pain, nausea and vomiting.   Objective:     Vital Signs (Most Recent):  Temp: 98.7 °F (37.1 °C) (12/24/22 1501)  Pulse: 88 (12/24/22 1501)  Resp: 20 (12/24/22 1501)  BP: (!) 107/53 (12/24/22 1501)  SpO2: 99 % (12/24/22 1501)   Vital Signs (24h Range):  Temp:  [97.8 °F (36.6 °C)-99 °F (37.2 °C)] 98.7 °F (37.1 °C)  Pulse:  [] 88  Resp:  [11-75] 20  SpO2:  [72 %-100 %] 99 %  BP: ()/(43-67) 107/53     Weight: 95.8 kg (211 lb 3.2 oz)  Body mass index is 34.09 kg/m².    Intake/Output Summary (Last 24 hours) at 12/24/2022 1645  Last data filed at 12/24/2022 1505  Gross per 24 hour   Intake 2286.52 ml   Output 1400 ml   Net 886.52 ml      Physical Exam  Vitals and nursing note reviewed.   Constitutional:       General: She is not in acute distress.     Appearance: She is well-developed.   HENT:      Head: Normocephalic and atraumatic.   Eyes:      General:         Right eye: No discharge.         Left eye: No discharge.      Conjunctiva/sclera: Conjunctivae normal.   Cardiovascular:      Rate and Rhythm: Normal rate.      Pulses: Normal pulses.   Pulmonary:      Effort: Pulmonary effort is normal. No respiratory distress.      Breath sounds: Wheezing present.   Abdominal:      Palpations: Abdomen is soft.      Tenderness: There is no abdominal tenderness.   Musculoskeletal:         General: Normal range of motion.      Right lower leg: No edema.      Left lower leg: No edema.   Skin:     General: Skin is warm and dry.   Neurological:      Mental Status: She is alert and oriented to person, place, and time.     Significant Labs:   CBC:  Recent Labs   Lab  12/23/22  0744 12/23/22  1015 12/23/22  1028 12/24/22  0321   WBC 8.54 7.65  --  5.41   HGB 8.1* 7.6*  --  7.6*   HCT 24.7* 22.8* 23* 22.5*   * 138*  --  128*   GRAN 6.2 70.0  5.4  --  67.2  3.6   LYMPH  --  7.1*  0.5*  --  8.7*  0.5*   MONO  --  21.7*  1.7*  --  23.1*  1.3*   EOS  --  0.0  --  0.0   BASO  --  0.02  --  0.01   CMP:  Recent Labs   Lab 12/23/22  0744 12/23/22  1015 12/24/22  0321   * 129* 128*   K 4.6 3.7 3.4*   CL 97 98 100   CO2 18* 20* 21*   BUN 39* 39* 37*   CREATININE 2.2* 2.1* 1.6*   GLU 86 86 96   CALCIUM 7.7* 6.9* 6.5*   MG 1.0* 1.0* 1.6   PHOS  --  3.5 3.3   ALKPHOS 93 87 92   AST 31 27 24   ALT 24 23 21   BILITOT 0.4 0.4 0.8   PROT 5.7* 5.2* 4.7*   ALBUMIN 2.4* 2.2* 1.9*   ANIONGAP 13 11 7*     Recent Labs   Lab 12/23/22  1015 12/23/22  1614   LACTATE 3.8* 0.9     Significant Imaging:   No new imaging this morning.      Assessment/Plan:      * Shock, unspecified  - Shock presumed septic in setting of ?intra-abdominal fluid collection / loose stool / hypotension not responsive to fluids alone.  - Continue piperacillin-tazobactam 4.5g IV q8hr for potential intra-abdominal source.  - Check C diff, stool studies- C difficile seeming less likely with no BM to date.  - Continue norepinephrine gtt; wean as feasible. Received additional 2L NS without response.  - Consult pulm/crit for further management / evaluation of shock given continued requirements.  - With CHF history repeat echo to evaluate for cardiogenic etiology.  - Surgery consulted in ED; appreciate assistance. Conservative management, no current indications for operative intervention.    Anemia of chronic disease  - Anemia in setting of malignancy / chemo / XRT. Patient reports darker stool, though was occult negative from stool produced in ED.  - Transfused 1U pRBCs 12/23; not significantly changed, but no evidence of acute bleeding or symptoms of anemia at this time.  - Trend Hgb.    JONATHAN (acute kidney  injury)  CKDIII  - JONATHAN likely secondary to GI losses / dehydration.  - Improved with IVFs; monitor with PO intake.    Recurrent adenocarcinoma of left lung  - Lung adenocarcinoma with recurrence undergoing current XRT/chemo with Dr. Gilmore.  - Palliative consulted, appreciate assistance.    Syndrome of inappropriate secretion of antidiuretic hormone  - Mild hyponatremia with h/o SIADH; monitor.    Chronic combined systolic and diastolic CHF (congestive heart failure)  - Last echo 05/2021 showed EF 38%.  - Appears volume depleted; monitor closely in setting of shock / fluid resuscitation.  - Hold diuretics in setting of shock / JONATHAN.    Generalized anxiety disorder  - Continue alprazolam 0.25mg PO daily PRN.    Hyperlipidemia  - Continue atorvastatin 40mg PO daily.    Essential hypertension  - Hold antihypertensives in setting of shock.    Chronic obstructive pulmonary disease  Chronic hypoxemic resp failure  - Continue fluticasone-vilanterol 100-25mcg inhaled daily, tiotropium 5mcg inhaled daily, albuterol-ipratropium 2.5-0.5mg inhaled q4hr PRN.  - Continue supplemental O2; uses 2L at baseline.    VTE Risk Mitigation (From admission, onward)         Ordered     enoxaparin injection 40 mg  Daily         12/24/22 1256     Reason for No Pharmacological VTE Prophylaxis  Once        Question:  Reasons:  Answer:  Risk of Bleeding    12/23/22 1609     IP VTE HIGH RISK PATIENT  Once         12/23/22 1609     Place sequential compression device  Until discontinued         12/23/22 1609                Discharge Planning   FELICIANO:      Code Status: Full Code   Is the patient medically ready for discharge?:     Reason for patient still in hospital (select all that apply): Treatment  Discharge Plan A: Home        Critical due to shock.    Critical care time spent on the evaluation and treatment of severe organ dysfunction, review of pertinent labs and imaging studies, discussions with consulting providers and discussions with  patient/family: 35 minutes.    HOMAR Austin MD  Department of Hospital Medicine   [facility] - Intensive Care ([place])

## 2022-12-24 NOTE — ASSESSMENT & PLAN NOTE
- Last echo 05/2021 showed EF 38%.  - Appears volume depleted; monitor closely in setting of shock / fluid resuscitation.  - Hold diuretics in setting of shock / JONATHAN.

## 2022-12-24 NOTE — ASSESSMENT & PLAN NOTE
Suspect hypovolemic shock and hypotension from massive diarrheal fluid losses, possibly secondary to magnesium therapy.  Finding of left lower quadrant air fluid level contained within a sigmoid diverticulum versus a pericolonic abscess from perforated sigmoid diverticulitis.  Patient is afebrile without leukocytosis or shift, though chemotherapy may blunt immune response and patient does not have significant tenderness or peritonitis.  Higher risk for complications due to recurrent and possibly metastatic lung adenocarcinoma on chemotherapy and radiation therapy, COPD on home oxygen, tobacco use and chronic kidney disease.      Recommendations:  - Continue IV fluid resuscitation, wean vasoactive agents as able to  - Strict I/O  - OK for clear liquid diet as tolerated, back down to NPO if worsening abdominal pain  - Reasonable to continue IV antibiotics for possible perforated sigmoid diverticulitis  - Monitor fever trend/CBC  - Continue to monitor abdominal examination  - Ideally would repeat CT scan of the abdomen and pelvis with IV and rectal contrast, trend creatinine to avoid further nephrotoxicity

## 2022-12-24 NOTE — ASSESSMENT & PLAN NOTE
Chronic hypoxemic resp failure  - Continue fluticasone-vilanterol 100-25mcg inhaled daily, tiotropium 5mcg inhaled daily, albuterol-ipratropium 2.5-0.5mg inhaled q4hr PRN.  - Continue supplemental O2; uses 2L at baseline.

## 2022-12-24 NOTE — ASSESSMENT & PLAN NOTE
Current smoker 1PPD. H/o 100 pack years. PFTs 2020 show mild obstruction, mild restriction, moderate DLCO reduction. On NoroEllipta + Albuterol PRN at home at 2L NC.   - continue breathing treatments and controller medications while inpatient  - wean to 2L NC or RA which is her baseline

## 2022-12-24 NOTE — HPI
Nalini Varma is a 72 y.o. female  with chronic pain, anxiety, COPD on 2 liters of home oxygen, CHF (EF of 38%), HTN, CKD stage 3 and lung adenocarcinoma presenting to the emergency department after rapid response evaluation in the Hematology/Oncology Clinic.  Patiently recently diagnosed with lung adenocarcinoma and last received chemotherapy 1 week ago.  She was going to a follow-up visit today when her blood pressure was noted to be 68/39 in clinic.  Repeat blood pressures within 20 minutes was 74/43 and 68/39.  Patient was sitting in her wheelchair during this time.  She states she did not feel lightheaded.  No episodes of syncope.  She is currently reporting chronic pain to her left leg and right great toe pain after a blister popped a few days ago.  She does report nocturnal diarrhea for the past week.  Approximately 1-2 episodes of watery, dark red/brown stool per night.  She denies abdominal pain or vomiting.  She is reporting urinary frequency but with subjective decreased urinary output.  She denies headache, lightheadedness, chest pain or shortness of breath.  No recent antibiotic use.     After resuscitation, the patient still required a small dose of pressors so ICU was consulted for further assistance.

## 2022-12-24 NOTE — ASSESSMENT & PLAN NOTE
Inpatient Consult Note    Reason for Consult: Vomiting, hiatal hernia  Patient Name: Gillian Auguste  YOB: 1953  MRN: [id number]    PCP: Nishi Vasquez DO  Referring Provider: Jaylene Torres MD    Subjective     Chief Complaint:   Chief Complaint   Patient presents with    Vomiting    Head Injury Without LOC     HPI:  Gillian Auguste is a 68 year old female PMH T2DM, HTN, GERD and known hiatal hernia who presented to the ED on 11/2 due to vomiting for months, worse for the past few days prior to arrival. Pt also had a syncopal episode on day of arrival.    On arrival to ED, VSS. Labs significant for leukocytosis of 17.4. CT head showed no acute intracranial process. CT abdomen concerning for incarcerated paraesophageal hernia.    Pt was evaluated by general surgery and taken to the OR on 11/3. Pt now POD 1 s/p laparoscopic paraesophageal hernia repair with partial fundoplication.     Pt states she is feeling well today. She has mild pain near her incision sites but denies n/v.     Review of Systems:   Constitutional: Denies fever, chills, sweats   Cardiovascular: Denies CP   Respiratory: Denies SOB  GI: Denies N/V/D/C, abdominal pain, hematemesis, melena, hematochezia    All other systems are negative.    Past Medical History:   Diagnosis Date    Depression     Diabetes mellitus (CMS/HCC)     Essential (primary) hypertension     Gastroesophageal reflux disease      Medications Prior to Admission   Medication Sig Dispense Refill    repaglinide (PRANDIN) 1 MG tablet Take 1 mg by mouth 2 times daily (before meals).      metFORMIN (GLUCOPHAGE) 500 MG tablet Take 1 tablet by mouth 2 (two) times a day.      atorvastatin (LIPITOR) 80 MG tablet Take 1 tablet by mouth daily.      citalopram (CeleXA) 40 MG tablet Take 1 tablet by mouth every evening.      pantoprazole (PROTONIX) 40 MG tablet Take 1 tablet by mouth daily.      Dexlansoprazole 60 MG capsule Take 60 mg by mouth daily.       ALLERGIES:  - Mild hyponatremia with h/o SIADH; monitor.     Allergen Reactions    Morphine HIVES      Past Surgical History:   Procedure Laterality Date    Cataract extraction w/ intraocular lens  implant, bilateral Bilateral 01/2016    Hysterectomy      1996     Family History   Problem Relation Age of Onset    Diabetes Sister      Social History     Tobacco Use    Smoking status: Never Smoker    Smokeless tobacco: Never Used   Vaping Use    Vaping Use: never used   Substance Use Topics    Alcohol use: Never    Drug use: Never       Objective     Vitals:    11/04/21 0933   BP: 110/56   Pulse: 69   Resp: 18   Temp: 99 °F (37.2 °C)       Physical Exam  General: AOx4, NAD, appears stated age, resting in bed   HEENT: normocephalic, atraumatic, EOMI  CV: RRR, +S1 +S2, no murmurs/rubs/gallops  Lungs: CTAB, no crackles/rhonchi/wheezing   Abdomen: soft, nontender, nondistended, BS present, tympanic to percussion. No rigidity, rebound or guarding. Incision sites clean/dry/intact  Extremities: no peripheral edema, clubbing, or cyanosis   Neuro: CN 2-12 grossly intact   Skin: warm, dry, intact      Intake/Output Summary (Last 24 hours) at 11/4/2021 1155  Last data filed at 11/3/2021 1527  Gross per 24 hour   Intake 800 ml   Output 15 ml   Net 785 ml        Recent Labs     11/02/21  1358 11/04/21  0637   WBC 17.4* 8.2   RBC 5.28* 4.36   HGB 14.3 11.8*   HCT 45.5 37.8    246   MCV 86.2 86.7   MCH 27.1 27.1   MCHC 31.4* 31.2*   NRBCRE 0 0       No results found    Recent Labs   Lab 11/04/21  0637 11/02/21  1358   SODIUM 143 140   POTASSIUM 3.5 4.0   CHLORIDE 107 100   CO2 31 30   GLUCOSE 131* 169*   BUN 27* 21*   CREATININE 0.80 0.79   CALCIUM 8.2* 10.1   TOTPROTEIN  --  8.4*   ALBUMIN  --  4.2   BILIRUBIN  --  1.2*   AST  --  15   GPT  --  25   ALKPT  --  90       No results found    No results found    No results found    Inpatient Medications   potassium CHLORIDE  40 mEq Oral Once    sodium chloride (PF)  2 mL Intracatheter 2 times per day    Potassium Standard Replacement  Protocol   Does not apply See Admin Instructions    [Held by provider] atorvastatin  80 mg Oral Daily    [Held by provider] citalopram  40 mg Oral Q Evening    heparin (porcine)  5,000 Units Subcutaneous 3 times per day    ondansetron  4 mg Intravenous Q6H    ketorolac (TORADOL) injection  15 mg Intravenous 4 times per day    pantoprazole  40 mg Intravenous Nightly      lactated ringers infusion 100 mL/hr at 11/04/21 0645     dextrose, dextrose, glucagon, dextrose, dextrose, sodium chloride, sodium chloride, sodium chloride, HYDROmorphone, metoCLOPramide (REGLAN) injection     Assessment and Plan     Ms. Auguste is a 67 y/o female who presented to the ED due to vomiting.    - POD 1 s/p laparoscopic paraesophageal hernia repair, doing well  - Advance diet per general surgery recommendation  - We will follow up with patient as an outpatient when discharged  - Rest of care per primary service    Pt discussed with Dr. Blanca Agarwal, Ms4    Gastroenterology attending:    As above with his  68-year-old female with history of hypertension, diabetes, admitted with nausea, vomiting, found to have leukocytosis and concern for incarcerated paraesophageal hernia  She is status post hernia repair yesterday  Doing well clinically  Underwent upper GI study today    1.  Pending results of upper GI study  2.  Diet per surgery service  3.  No further work-up from GI standpoint right now  4.  Rest of excellent care per primary team    Erica Rios MD

## 2022-12-24 NOTE — ASSESSMENT & PLAN NOTE
CKDIII  - JONATHAN likely secondary to GI losses / dehydration.  - Improved with IVFs; monitor with PO intake.

## 2022-12-24 NOTE — ASSESSMENT & PLAN NOTE
H/o of stage I adenocarcinoma in 2020, non-surgical candidate, treated with SBRT in 10/2020. Followed closely with surveillance, now with new JASSI lesions s/p PET scan and initiation of Chemo/limited XRT that began in November 2022 for recurrent adenocarcinoma. (unable to obtain a biopsy of the recurrence)  - no intervention needed  - she should continue to follow her therapy when out of the hospital if she can tolerate it

## 2022-12-24 NOTE — EICU
Alerted by bedside regarding calcium 6.5   K 3.4   Cr 1.7     Added calcium gluconate   Will continue to watch K

## 2022-12-24 NOTE — PROGRESS NOTES
[facility] - Intensive Care [facility]  General Surgery  Progress Note    Subjective:     Interval History:  Feeling better, had solid food for dinner and breakfast.  Denies abdominal pain.  Expresses decision not to have any procedures that would require general anesthesia including abdominal surgery should it be indicated.    Post-Op Info:  * No surgery found *          Medications:  Continuous Infusions:   NORepinephrine bitartrate-D5W 0.05 mcg/kg/min (12/24/22 0700)     Scheduled Meds:   atorvastatin  40 mg Oral Daily    azelastine  1 spray Nasal BID    DULoxetine  60 mg Oral Daily    enoxaparin  40 mg Subcutaneous Daily    fluticasone furoate-vilanteroL  1 puff Inhalation Daily    gabapentin  200 mg Oral TID    mupirocin   Nasal BID    pantoprazole  40 mg Oral Daily    piperacillin-tazobactam (ZOSYN) IVPB  4.5 g Intravenous Q8H    sodium chloride 0.9%  10 mL Intravenous Q6H    tiotropium bromide  2 puff Inhalation Daily    traZODone  100 mg Oral QHS     PRN Meds:sodium chloride, sodium chloride, acetaminophen, albuterol-ipratropium, ALPRAZolam, HYDROcodone-acetaminophen, ondansetron, ondansetron, sodium chloride 0.9%, Flushing PICC Protocol **AND** sodium chloride 0.9% **AND** sodium chloride 0.9%     Objective:     Vital Signs (Most Recent):  Temp: 98.5 °F (36.9 °C) (12/24/22 1101)  Pulse: 84 (12/24/22 1201)  Resp: (!) 43 (12/24/22 1201)  BP: (!) 123/59 (12/24/22 1201)  SpO2: (!) 73 % (12/24/22 1201)   Vital Signs (24h Range):  Temp:  [97.8 °F (36.6 °C)-99 °F (37.2 °C)] 98.5 °F (36.9 °C)  Pulse:  [] 84  Resp:  [11-75] 43  SpO2:  [72 %-100 %] 73 %  BP: ()/(43-67) 123/59       Intake/Output Summary (Last 24 hours) at 12/24/2022 1453  Last data filed at 12/24/2022 1330  Gross per 24 hour   Intake 3285.52 ml   Output 1400 ml   Net 1885.52 ml       Physical Exam  General: 72 y.o. female in no acute distress   Neuro: grossly intact    HEENT: pupils grossly reactive to light  Respiratory: respirations are  even and unlabored, oxygen via nasal cannula  Cardiac: regular rate and rhythm  Abdomen:  Soft, nontender, nondistended  Skin: warm, dry    Significant Labs:  All pertinent labs from the last 24 hours have been reviewed.    Significant Diagnostics:  I have reviewed all pertinent imaging results/findings within the past 24 hours.    Assessment/Plan:     Active Diagnoses:    Diagnosis Date Noted POA    PRINCIPAL PROBLEM:  Septic shock [A41.9, R65.21] 12/23/2022 Yes    JONATHAN (acute kidney injury) [N17.9] 12/23/2022 Yes    Advance care planning [Z71.89] 12/23/2022 Not Applicable    Anemia of chronic disease [D63.8] 12/23/2022 Yes    Recurrent adenocarcinoma of left lung [C34.92] 10/28/2022 Yes     Chronic    Syndrome of inappropriate secretion of antidiuretic hormone [E22.2] 10/02/2022 Yes     Chronic    Severe obesity [E66.01] 04/29/2022 Yes     Chronic    Chronic hypoxemic respiratory failure [J96.11] 05/13/2021 Yes     Chronic    Chronic combined systolic and diastolic CHF (congestive heart failure) [I50.42] 09/17/2018 Yes     Chronic    CKD (chronic kidney disease) stage 3, GFR 30-59 ml/min [N18.30] 2017 Yes     Chronic    Generalized anxiety disorder [F41.1] 11/14/2016 Yes     Chronic    Hyperlipidemia [E78.5] 09/20/2016 Yes     Chronic    Essential hypertension [I10] 03/16/2016 Yes     Chronic    Chronic obstructive pulmonary disease [J44.9] 03/16/2016 Yes     Chronic      Problems Resolved During this Admission:     Suspect hypovolemic shock and hypotension from massive diarrheal fluid losses, possibly secondary to magnesium therapy.  Finding of left lower quadrant air fluid level contained within a sigmoid diverticulum versus a pericolonic abscess from perforated sigmoid diverticulitis.  Patient is afebrile without leukocytosis or shift, though chemotherapy may blunt immune response and patient does not have significant tenderness or peritonitis.  Higher risk for complications due to recurrent and possibly  metastatic lung adenocarcinoma on chemotherapy and radiation therapy, COPD on home oxygen, tobacco use and chronic kidney disease.      Abdominal examination has improved significantly.  Patient is tolerating a diet.  Clinical course not consistent with perforated diverticulitis.    The patient has elected not to pursue any surgical intervention or procedure that would require general anesthesia.     Recommendations:  - Continue IV fluid resuscitation, wean vasoactive agents as able to  - Strict I/O  - Diet as tolerated  - If persistent concern for acute perforated diverticulitis would repeat CT scan of the abdomen and pelvis with IV and rectal contrast, percutaneous drain may be an option    Surgery will sign off at this time due to patient's preference and decision to decline any surgical procedure.  Please reach out should there be any questions or concerns, or patient changes her mind about surgical interventions.    Caitlin Dillon MD  General Surgery  [facility] - Intensive Care ([place])

## 2022-12-24 NOTE — SUBJECTIVE & OBJECTIVE
Past Medical History:   Diagnosis Date    Anxiety     Cervical spinal stenosis     Choledocholithiasis     Chronic obstructive pulmonary disease 03/16/2016    Degenerative disc disease of cervical spine     Diverticulosis 04/24/2018    History of alcohol abuse 03/02/2021    History of gastric ulcer     History of L3 compression fracture 12/19/2018    History of lung cancer     s/p completion of XRT in 10/2020    Hyperlipidemia     Iron deficiency anemia     Osteoarthritis     Stage III CKD 2017       Past Surgical History:   Procedure Laterality Date    BREAST CYST EXCISION Right     1967    CATARACT EXTRACTION      COLONOSCOPY  2015    COLONOSCOPY N/A 6/8/2022    Procedure: COLONOSCOPY;  Surgeon: Helio Cheema MD;  Location: [facility] ([address] [place] [address]);  Service: Endoscopy;  Laterality: N/A;  5/25-Pt requesting Dr. Cheema-approval given per Dr. Cheema-ml  Fully vaccinated, prep instr portal -ml    CORONARY ANGIOGRAPHY N/A 7/20/2018    Procedure: ANGIOGRAM, CORONARY ARTERY;  Surgeon: Willard Roberts MD;  Location: [facility] CATH LAB;  Service: Cardiovascular;  Laterality: N/A;    ENDOSCOPIC ULTRASOUND OF UPPER GASTROINTESTINAL TRACT N/A 3/24/2021    Procedure: ULTRASOUND, UPPER GI TRACT, ENDOSCOPIC;  Surgeon: Helio Cheema MD;  Location: [address]);  Service: Endoscopy;  Laterality: N/A;    ENDOSCOPIC ULTRASOUND OF UPPER GASTROINTESTINAL TRACT N/A 4/25/2022    Procedure: ULTRASOUND, UPPER GI TRACT, ENDOSCOPIC;  Surgeon: Helio Cheema MD;  [address]  Service: Endoscopy;  Laterality: N/A;  cardiac risk assessment 1 per Dr. Ortez. see t/e 3/17-SC  3/25:new instructions via portal. home with medical transport?-SC    ERCP N/A 3/24/2021    Procedure: ERCP (ENDOSCOPIC RETROGRADE CHOLANGIOPANCREATOGRAPHY);  Surgeon: Helio Cheema MD;  [facility]  Service: Endoscopy;  Laterality: N/A;    ERCP N/A 4/30/2021    Procedure: ERCP (ENDOSCOPIC RETROGRADE  CHOLANGIOPANCREATOGRAPHY);  Surgeon: Boo Gray MD;  Location: [facility] ENDO (2ND FLR);  Service: Endoscopy;  Laterality: N/A;    ERCP N/A 7/1/2021    Procedure: ERCP (ENDOSCOPIC RETROGRADE CHOLANGIOPANCREATOGRAPHY);  Surgeon: Helio Cheema MD;  Location: [facility] ENDO (2ND FLR);  Service: Endoscopy;  Laterality: N/A;  Ok for Taxi. Dr Cheema  rapid covid 1130am- tb inst email    ESOPHAGOGASTRODUODENOSCOPY  2015    ESOPHAGOGASTRODUODENOSCOPY N/A 3/4/2021    Procedure: EGD (ESOPHAGOGASTRODUODENOSCOPY);  Surgeon: Yenifer Ramirez MD;  Location: [facility];  Service: Endoscopy;  Laterality: N/A;    ESOPHAGOGASTRODUODENOSCOPY N/A 3/24/2021    Procedure: EGD (ESOPHAGOGASTRODUODENOSCOPY);  Surgeon: Helio Cheema MD;  Location: [facility] (2ND FLR);  Service: Endoscopy;  Laterality: N/A;    EYE SURGERY  2016    Cataracts    FRACTURE SURGERY  2014    JOINT REPLACEMENT  2007    ORIF FEMUR FRACTURE Left     TOTAL KNEE ARTHROPLASTY Left 2007    TUBAL LIGATION         Review of patient's allergies indicates:   Allergen Reactions    Wellbutrin [bupropion hcl] Other (See Comments)     Hyponatremia and hypomagnesia     Zoloft [sertraline] Other (See Comments)     Hyponatremia and hypomagnesia     Bananas [banana]      Emesis, and stomach cramps    Patient states she is not allergic to Banana       Family History       Problem Relation (Age of Onset)    Alzheimer's disease Mother    Arthritis Father, Brother    Cancer Sister    Colon cancer Brother    Dementia Mother    Depression Mother    Hypertension Mother, Brother    Miscarriages / Stillbirths Sister    Myasthenia gravis Father    No Known Problems Son    Rectal cancer Father          Tobacco Use    Smoking status: Some Days     Packs/day: 1.00     Years: 53.00     Pack years: 53.00     Types: Cigarettes     Start date: 4/30/1967    Smokeless tobacco: Never    Tobacco comments:     I had quit for about 6 months this past year. Then massive stress and started back up  sometimes   Substance and Sexual Activity    Alcohol use: Yes     Alcohol/week: 7.0 standard drinks     Types: 7 Drinks containing 0.5 oz of alcohol per week     Comment: at least 1 cocktail a day    Drug use: No    Sexual activity: Not Currently     Partners: Male     Birth control/protection: Abstinence, Post-menopausal         Review of Systems   Constitutional:  Negative for chills and fever.   HENT:  Positive for postnasal drip and sinus pressure.    Respiratory:  Positive for wheezing. Negative for shortness of breath.    Cardiovascular:  Positive for leg swelling. Negative for chest pain.   Gastrointestinal:  Positive for diarrhea. Negative for nausea and vomiting.   Musculoskeletal:  Positive for arthralgias (chronic) and back pain (chronic).   Neurological:  Negative for syncope and weakness.   Psychiatric/Behavioral:  Negative for agitation, confusion and hallucinations.    Objective:     Vital Signs (Most Recent):  Temp: 98.2 °F (36.8 °C) (12/24/22 0701)  Pulse: 77 (12/24/22 0801)  Resp: (!) 34 (12/24/22 0801)  BP: (!) 148/67 (12/24/22 0801)  SpO2: (!) 91 % (12/24/22 0801)   Vital Signs (24h Range):  Temp:  [97.8 °F (36.6 °C)-99 °F (37.2 °C)] 98.2 °F (36.8 °C)  Pulse:  [] 77  Resp:  [11-75] 34  SpO2:  [72 %-100 %] 91 %  BP: ()/(28-87) 148/67     Weight: 95.8 kg (211 lb 3.2 oz)  Body mass index is 34.09 kg/m².      Intake/Output Summary (Last 24 hours) at 12/24/2022 0900  Last data filed at 12/24/2022 0700  Gross per 24 hour   Intake 3285.52 ml   Output 700 ml   Net 2585.52 ml       Physical Exam  Vitals and nursing note reviewed.   Constitutional:       General: She is not in acute distress.     Appearance: She is obese. She is not ill-appearing.   HENT:      Head: Normocephalic.   Eyes:      General: No scleral icterus.  Cardiovascular:      Rate and Rhythm: Normal rate and regular rhythm.      Pulses: Normal pulses.      Heart sounds: No murmur heard.  Pulmonary:      Effort: Pulmonary  effort is normal.      Breath sounds: Wheezing and rhonchi present.   Abdominal:      General: There is distension.      Palpations: Abdomen is soft.      Tenderness: There is no guarding or rebound.   Musculoskeletal:      Right lower leg: Edema present.      Left lower leg: Edema present.   Skin:     General: Skin is warm and dry.      Comments: Yellowing of nails   Neurological:      General: No focal deficit present.      Mental Status: She is alert and oriented to person, place, and time.   Psychiatric:         Mood and Affect: Mood normal.         Behavior: Behavior normal.       Vents:       Lines/Drains/Airways       Peripherally Inserted Central Catheter Line  Duration             PICC Triple Lumen 12/23/22 1708 right basilic <1 day              Drain  Duration             Female External Urinary Catheter 12/23/22 1500 <1 day              Peripheral Intravenous Line  Duration                  Peripheral IV - Single Lumen 12/23/22 1000 20 G Left Antecubital <1 day         Peripheral IV - Single Lumen 12/23/22 1004 20 G Posterior;Right Hand <1 day                    Significant Labs:    CBC/Anemia Profile:  Recent Labs   Lab 12/23/22  0744 12/23/22  1015 12/23/22  1028 12/24/22  0321   WBC 8.54 7.65  --  5.41   HGB 8.1* 7.6*  --  7.6*   HCT 24.7* 22.8* 23* 22.5*   * 138*  --  128*   MCV 97 97  --  95   RDW 14.3 14.4  --  14.6*        Chemistries:  Recent Labs   Lab 12/23/22  0744 12/23/22  1015 12/24/22  0321   * 129* 128*   K 4.6 3.7 3.4*   CL 97 98 100   CO2 18* 20* 21*   BUN 39* 39* 37*   CREATININE 2.2* 2.1* 1.6*   CALCIUM 7.7* 6.9* 6.5*   ALBUMIN 2.4* 2.2* 1.9*   PROT 5.7* 5.2* 4.7*   BILITOT 0.4 0.4 0.8   ALKPHOS 93 87 92   ALT 24 23 21   AST 31 27 24   MG 1.0* 1.0* 1.6   PHOS  --  3.5 3.3       All pertinent labs within the past 24 hours have been reviewed.    Significant Imaging:   I have reviewed all pertinent imaging results/findings within the past 24 hours.    NM PET CT 7/22:  .1 cm  left upper lobe nodule with minimal tracer uptake.  Difficult to characterize on PET due to small size although remains concerning for neoplasm considering increase in size over prior CTs.  Additional stable subcentimeter left upper lobe nodule which is too small to characterize with PET.     Stable bandlike opacity and volume loss in the right upper lobe with low level radiotracer uptake.  Findings favored to represent post treatment changes.     Borderline mediastinal lymphadenopathy with radiotracer uptake similar to blood pool.  Findings could represent reactive etiology or metastasis.    CT Chest 10/28:  Stable right upper lobe consolidative opacity with volume loss and architectural distortion consistent post fibrotic changes.     Interval enlargement of a subaortic lymph node, now 16mm; previously 12mm.     Index left upper lobe nodule 10.4 mm, previously 10.4mm  Mm.     Index left upper lobe nodule 3.8 mm, previous 3.8 mm.    CT AP 12/23:  Air/fluid collection in the pelvis which is inseparable from the sigmoid colon and adjacent uterus.  Unclear if this finding represents air and fluid within a large diverticulum or true perisigmoid abscess.  Suggest correlation for any symptoms of sigmoid diverticulitis or colitis.  Follow-up CT with IV and/or enteric contrast may provide improved sensitivity when clinically warranted.     Liquid stool in the colon.  Mild rectal wall thickening.     Bilateral perinephric fat stranding and bilateral hyperdense renal lesions, similar to prior studies.

## 2022-12-24 NOTE — ASSESSMENT & PLAN NOTE
- Shock presumed septic in setting of ?intra-abdominal fluid collection / loose stool / hypotension not responsive to fluids alone.  - Continue piperacillin-tazobactam 4.5g IV q8hr for potential intra-abdominal source.  - Check C diff, stool studies- C difficile seeming less likely with no BM to date.  - Continue norepinephrine gtt; wean as feasible. Received additional 2L NS without response.  - Consult pulm/crit for further management / evaluation of shock given continued requirements.  - With CHF history repeat echo to evaluate for cardiogenic etiology.  - Surgery consulted in ED; appreciate assistance. Conservative management, no current indications for operative intervention.

## 2022-12-24 NOTE — ASSESSMENT & PLAN NOTE
Patient is identified as having Combined Systolic and Diastolic heart failure that is Chronic. CHF is currently controlled. Latest ECHO performed and demonstrates- Results for orders placed during the hospital encounter of 04/29/21    Echo Color Flow Doppler? Yes    Interpretation Summary  · The left ventricle is normal in size with moderately decreased systolic function.  · The estimated ejection fraction is 38%.  · There are segmental left ventricular wall motion abnormalities.  · Left ventricular diastolic dysfunction.  · Normal right ventricular size with low normal right ventricular systolic function.  · Mild right atrial enlargement.  · There is mild aortic valve stenosis.  · Aortic valve area is 2.00 cm2; peak velocity is 1.91 m/s; mean gradient is 10 mmHg.  · Normal central venous pressure (3 mmHg).  . Continue Beta Blocker, ACE/ARB and Furosemide and monitor clinical status closely. Monitor on telemetry. Patient is off CHF pathway.  Monitor strict Is&Os and daily weights.  Place on fluid restriction of 1.5 L. Continue to stress to patient importance of self efficacy and  on diet for CHF. Last BNP reviewed- and noted below   Recent Labs   Lab 12/23/22  1015   *   .

## 2022-12-25 LAB
ALBUMIN SERPL BCP-MCNC: 1.8 G/DL (ref 3.5–5.2)
ALP SERPL-CCNC: 89 U/L (ref 55–135)
ALT SERPL W/O P-5'-P-CCNC: 21 U/L (ref 10–44)
ANION GAP SERPL CALC-SCNC: 7 MMOL/L (ref 8–16)
ANION GAP SERPL CALC-SCNC: 8 MMOL/L (ref 8–16)
AST SERPL-CCNC: 17 U/L (ref 10–40)
BASOPHILS # BLD AUTO: 0.01 K/UL (ref 0–0.2)
BASOPHILS NFR BLD: 0.2 % (ref 0–1.9)
BILIRUB SERPL-MCNC: 0.3 MG/DL (ref 0.1–1)
BUN SERPL-MCNC: 24 MG/DL (ref 8–23)
BUN SERPL-MCNC: 29 MG/DL (ref 8–23)
CA-I BLDV-SCNC: 0.94 MMOL/L (ref 1.06–1.42)
CA-I BLDV-SCNC: 0.94 MMOL/L (ref 1.06–1.42)
CALCIUM SERPL-MCNC: 6.2 MG/DL (ref 8.7–10.5)
CALCIUM SERPL-MCNC: 6.4 MG/DL (ref 8.7–10.5)
CHLORIDE SERPL-SCNC: 100 MMOL/L (ref 95–110)
CHLORIDE SERPL-SCNC: 99 MMOL/L (ref 95–110)
CO2 SERPL-SCNC: 21 MMOL/L (ref 23–29)
CO2 SERPL-SCNC: 21 MMOL/L (ref 23–29)
CREAT SERPL-MCNC: 1 MG/DL (ref 0.5–1.4)
CREAT SERPL-MCNC: 1.2 MG/DL (ref 0.5–1.4)
DIFFERENTIAL METHOD: ABNORMAL
EOSINOPHIL # BLD AUTO: 0 K/UL (ref 0–0.5)
EOSINOPHIL NFR BLD: 0.4 % (ref 0–8)
ERYTHROCYTE [DISTWIDTH] IN BLOOD BY AUTOMATED COUNT: 14.7 % (ref 11.5–14.5)
EST. GFR  (NO RACE VARIABLE): 48 ML/MIN/1.73 M^2
EST. GFR  (NO RACE VARIABLE): 59 ML/MIN/1.73 M^2
GLUCOSE SERPL-MCNC: 108 MG/DL (ref 70–110)
GLUCOSE SERPL-MCNC: 94 MG/DL (ref 70–110)
HCT VFR BLD AUTO: 23.6 % (ref 37–48.5)
HGB BLD-MCNC: 8 G/DL (ref 12–16)
IMM GRANULOCYTES # BLD AUTO: 0.02 K/UL (ref 0–0.04)
IMM GRANULOCYTES NFR BLD AUTO: 0.4 % (ref 0–0.5)
LYMPHOCYTES # BLD AUTO: 0.8 K/UL (ref 1–4.8)
LYMPHOCYTES NFR BLD: 15.4 % (ref 18–48)
MAGNESIUM SERPL-MCNC: 1.3 MG/DL (ref 1.6–2.6)
MAGNESIUM SERPL-MCNC: 1.6 MG/DL (ref 1.6–2.6)
MCH RBC QN AUTO: 31.9 PG (ref 27–31)
MCHC RBC AUTO-ENTMCNC: 33.9 G/DL (ref 32–36)
MCV RBC AUTO: 94 FL (ref 82–98)
MONOCYTES # BLD AUTO: 1.1 K/UL (ref 0.3–1)
MONOCYTES NFR BLD: 22.3 % (ref 4–15)
NEUTROPHILS # BLD AUTO: 3 K/UL (ref 1.8–7.7)
NEUTROPHILS NFR BLD: 61.3 % (ref 38–73)
NRBC BLD-RTO: 0 /100 WBC
PHOSPHATE SERPL-MCNC: 2 MG/DL (ref 2.7–4.5)
PHOSPHATE SERPL-MCNC: 3.8 MG/DL (ref 2.7–4.5)
PLATELET # BLD AUTO: 120 K/UL (ref 150–450)
PMV BLD AUTO: 9.8 FL (ref 9.2–12.9)
POTASSIUM SERPL-SCNC: 3.9 MMOL/L (ref 3.5–5.1)
POTASSIUM SERPL-SCNC: 3.9 MMOL/L (ref 3.5–5.1)
PROT SERPL-MCNC: 4.7 G/DL (ref 6–8.4)
RBC # BLD AUTO: 2.51 M/UL (ref 4–5.4)
SODIUM SERPL-SCNC: 128 MMOL/L (ref 136–145)
SODIUM SERPL-SCNC: 128 MMOL/L (ref 136–145)
WBC # BLD AUTO: 4.88 K/UL (ref 3.9–12.7)

## 2022-12-25 PROCEDURE — 25000003 PHARM REV CODE 250: Performed by: PHYSICIAN ASSISTANT

## 2022-12-25 PROCEDURE — 89055 LEUKOCYTE ASSESSMENT FECAL: CPT | Performed by: PHYSICIAN ASSISTANT

## 2022-12-25 PROCEDURE — 87045 FECES CULTURE AEROBIC BACT: CPT | Performed by: PHYSICIAN ASSISTANT

## 2022-12-25 PROCEDURE — 94761 N-INVAS EAR/PLS OXIMETRY MLT: CPT

## 2022-12-25 PROCEDURE — 87425 ROTAVIRUS AG IA: CPT | Performed by: PHYSICIAN ASSISTANT

## 2022-12-25 PROCEDURE — 84100 ASSAY OF PHOSPHORUS: CPT | Performed by: INTERNAL MEDICINE

## 2022-12-25 PROCEDURE — 99900035 HC TECH TIME PER 15 MIN (STAT)

## 2022-12-25 PROCEDURE — 11000001 HC ACUTE MED/SURG PRIVATE ROOM

## 2022-12-25 PROCEDURE — 85025 COMPLETE CBC W/AUTO DIFF WBC: CPT | Performed by: INTERNAL MEDICINE

## 2022-12-25 PROCEDURE — 99291 CRITICAL CARE FIRST HOUR: CPT | Mod: GC,,, | Performed by: INTERNAL MEDICINE

## 2022-12-25 PROCEDURE — 87209 SMEAR COMPLEX STAIN: CPT | Performed by: PHYSICIAN ASSISTANT

## 2022-12-25 PROCEDURE — 25000003 PHARM REV CODE 250: Performed by: INTERNAL MEDICINE

## 2022-12-25 PROCEDURE — 63600175 PHARM REV CODE 636 W HCPCS: Performed by: INTERNAL MEDICINE

## 2022-12-25 PROCEDURE — 82330 ASSAY OF CALCIUM: CPT | Mod: 91 | Performed by: INTERNAL MEDICINE

## 2022-12-25 PROCEDURE — 80053 COMPREHEN METABOLIC PANEL: CPT | Performed by: INTERNAL MEDICINE

## 2022-12-25 PROCEDURE — 99233 PR SUBSEQUENT HOSPITAL CARE,LEVL III: ICD-10-PCS | Mod: ,,, | Performed by: INTERNAL MEDICINE

## 2022-12-25 PROCEDURE — 99291 PR CRITICAL CARE, E/M 30-74 MINUTES: ICD-10-PCS | Mod: GC,,, | Performed by: INTERNAL MEDICINE

## 2022-12-25 PROCEDURE — 94640 AIRWAY INHALATION TREATMENT: CPT

## 2022-12-25 PROCEDURE — 80048 BASIC METABOLIC PNL TOTAL CA: CPT | Mod: XB | Performed by: INTERNAL MEDICINE

## 2022-12-25 PROCEDURE — 83993 ASSAY FOR CALPROTECTIN FECAL: CPT | Performed by: PHYSICIAN ASSISTANT

## 2022-12-25 PROCEDURE — A4216 STERILE WATER/SALINE, 10 ML: HCPCS | Performed by: INTERNAL MEDICINE

## 2022-12-25 PROCEDURE — 87046 STOOL CULTR AEROBIC BACT EA: CPT | Performed by: PHYSICIAN ASSISTANT

## 2022-12-25 PROCEDURE — 83735 ASSAY OF MAGNESIUM: CPT | Performed by: INTERNAL MEDICINE

## 2022-12-25 PROCEDURE — 99233 SBSQ HOSP IP/OBS HIGH 50: CPT | Mod: ,,, | Performed by: INTERNAL MEDICINE

## 2022-12-25 PROCEDURE — 63600175 PHARM REV CODE 636 W HCPCS: Performed by: SURGERY

## 2022-12-25 PROCEDURE — 25000242 PHARM REV CODE 250 ALT 637 W/ HCPCS: Performed by: INTERNAL MEDICINE

## 2022-12-25 PROCEDURE — 27000221 HC OXYGEN, UP TO 24 HOURS

## 2022-12-25 PROCEDURE — 87329 GIARDIA AG IA: CPT | Performed by: PHYSICIAN ASSISTANT

## 2022-12-25 PROCEDURE — 87427 SHIGA-LIKE TOXIN AG IA: CPT | Mod: 59 | Performed by: PHYSICIAN ASSISTANT

## 2022-12-25 RX ORDER — MAGNESIUM SULFATE HEPTAHYDRATE 40 MG/ML
2 INJECTION, SOLUTION INTRAVENOUS ONCE
Status: COMPLETED | OUTPATIENT
Start: 2022-12-25 | End: 2022-12-25

## 2022-12-25 RX ORDER — CALCIUM GLUCONATE 98 MG/ML
1 INJECTION, SOLUTION INTRAVENOUS ONCE
Status: DISCONTINUED | OUTPATIENT
Start: 2022-12-25 | End: 2022-12-25

## 2022-12-25 RX ORDER — MAGNESIUM SULFATE 1 G/100ML
1 INJECTION INTRAVENOUS ONCE
Status: COMPLETED | OUTPATIENT
Start: 2022-12-25 | End: 2022-12-25

## 2022-12-25 RX ORDER — CALCIUM GLUCONATE 20 MG/ML
1 INJECTION, SOLUTION INTRAVENOUS
Status: COMPLETED | OUTPATIENT
Start: 2022-12-25 | End: 2022-12-25

## 2022-12-25 RX ORDER — HYDROCODONE BITARTRATE AND ACETAMINOPHEN 10; 325 MG/1; MG/1
1 TABLET ORAL EVERY 6 HOURS PRN
Status: DISCONTINUED | OUTPATIENT
Start: 2022-12-25 | End: 2022-12-27 | Stop reason: HOSPADM

## 2022-12-25 RX ORDER — CALCIUM GLUCONATE 20 MG/ML
1 INJECTION, SOLUTION INTRAVENOUS ONCE
Status: COMPLETED | OUTPATIENT
Start: 2022-12-25 | End: 2022-12-25

## 2022-12-25 RX ORDER — SODIUM,POTASSIUM PHOSPHATES 280-250MG
1 POWDER IN PACKET (EA) ORAL ONCE
Status: COMPLETED | OUTPATIENT
Start: 2022-12-25 | End: 2022-12-25

## 2022-12-25 RX ADMIN — POTASSIUM & SODIUM PHOSPHATES POWDER PACK 280-160-250 MG 1 PACKET: 280-160-250 PACK at 03:12

## 2022-12-25 RX ADMIN — TIOTROPIUM BROMIDE INHALATION SPRAY 2 PUFF: 3.12 SPRAY, METERED RESPIRATORY (INHALATION) at 09:12

## 2022-12-25 RX ADMIN — PANTOPRAZOLE SODIUM 40 MG: 40 TABLET, DELAYED RELEASE ORAL at 09:12

## 2022-12-25 RX ADMIN — SODIUM CHLORIDE 1000 ML: 0.9 INJECTION, SOLUTION INTRAVENOUS at 09:12

## 2022-12-25 RX ADMIN — CALCIUM GLUCONATE 1 G: 20 INJECTION, SOLUTION INTRAVENOUS at 04:12

## 2022-12-25 RX ADMIN — SODIUM PHOSPHATE, MONOBASIC, MONOHYDRATE AND SODIUM PHOSPHATE, DIBASIC, ANHYDROUS 30 MMOL: 142; 276 INJECTION, SOLUTION INTRAVENOUS at 08:12

## 2022-12-25 RX ADMIN — CALCIUM GLUCONATE 1 G: 20 INJECTION, SOLUTION INTRAVENOUS at 02:12

## 2022-12-25 RX ADMIN — LEVALBUTEROL HYDROCHLORIDE 0.63 MG: 0.63 SOLUTION RESPIRATORY (INHALATION) at 11:12

## 2022-12-25 RX ADMIN — AZELASTINE HYDROCHLORIDE 137 MCG: 137 SPRAY, METERED NASAL at 08:12

## 2022-12-25 RX ADMIN — SODIUM CHLORIDE, PRESERVATIVE FREE 10 ML: 5 INJECTION INTRAVENOUS at 12:12

## 2022-12-25 RX ADMIN — GUAIFENESIN 600 MG: 600 TABLET, EXTENDED RELEASE ORAL at 08:12

## 2022-12-25 RX ADMIN — MUPIROCIN: 20 OINTMENT TOPICAL at 09:12

## 2022-12-25 RX ADMIN — GUAIFENESIN 600 MG: 600 TABLET, EXTENDED RELEASE ORAL at 09:12

## 2022-12-25 RX ADMIN — AZELASTINE HYDROCHLORIDE 137 MCG: 137 SPRAY, METERED NASAL at 09:12

## 2022-12-25 RX ADMIN — LEVALBUTEROL HYDROCHLORIDE 0.63 MG: 0.63 SOLUTION RESPIRATORY (INHALATION) at 09:12

## 2022-12-25 RX ADMIN — ENOXAPARIN SODIUM 40 MG: 40 INJECTION SUBCUTANEOUS at 04:12

## 2022-12-25 RX ADMIN — MUPIROCIN: 20 OINTMENT TOPICAL at 08:12

## 2022-12-25 RX ADMIN — PIPERACILLIN AND TAZOBACTAM 4.5 G: 4; .5 INJECTION, POWDER, LYOPHILIZED, FOR SOLUTION INTRAVENOUS; PARENTERAL at 07:12

## 2022-12-25 RX ADMIN — MAGNESIUM SULFATE 1 G: 1 INJECTION INTRAVENOUS at 05:12

## 2022-12-25 RX ADMIN — MAGNESIUM SULFATE 2 G: 2 INJECTION INTRAVENOUS at 02:12

## 2022-12-25 RX ADMIN — CALCIUM GLUCONATE 1 G: 20 INJECTION, SOLUTION INTRAVENOUS at 03:12

## 2022-12-25 RX ADMIN — GABAPENTIN 200 MG: 100 CAPSULE ORAL at 02:12

## 2022-12-25 RX ADMIN — Medication 0.03 MCG/KG/MIN: at 02:12

## 2022-12-25 RX ADMIN — TRAZODONE HYDROCHLORIDE 100 MG: 50 TABLET ORAL at 08:12

## 2022-12-25 RX ADMIN — SODIUM CHLORIDE, PRESERVATIVE FREE 10 ML: 5 INJECTION INTRAVENOUS at 05:12

## 2022-12-25 RX ADMIN — MAGNESIUM SULFATE 1 G: 1 INJECTION INTRAVENOUS at 07:12

## 2022-12-25 RX ADMIN — SODIUM CHLORIDE, PRESERVATIVE FREE 10 ML: 5 INJECTION INTRAVENOUS at 06:12

## 2022-12-25 RX ADMIN — LEVALBUTEROL HYDROCHLORIDE 0.63 MG: 0.63 SOLUTION RESPIRATORY (INHALATION) at 03:12

## 2022-12-25 RX ADMIN — PIPERACILLIN AND TAZOBACTAM 4.5 G: 4; .5 INJECTION, POWDER, LYOPHILIZED, FOR SOLUTION INTRAVENOUS; PARENTERAL at 10:12

## 2022-12-25 RX ADMIN — ACETAMINOPHEN 650 MG: 325 TABLET, FILM COATED ORAL at 08:12

## 2022-12-25 RX ADMIN — GABAPENTIN 200 MG: 100 CAPSULE ORAL at 09:12

## 2022-12-25 RX ADMIN — FLUTICASONE FUROATE AND VILANTEROL TRIFENATATE 1 PUFF: 100; 25 POWDER RESPIRATORY (INHALATION) at 09:12

## 2022-12-25 RX ADMIN — PIPERACILLIN AND TAZOBACTAM 4.5 G: 4; .5 INJECTION, POWDER, LYOPHILIZED, FOR SOLUTION INTRAVENOUS; PARENTERAL at 02:12

## 2022-12-25 RX ADMIN — GABAPENTIN 200 MG: 100 CAPSULE ORAL at 08:12

## 2022-12-25 RX ADMIN — HYDROCODONE BITARTRATE AND ACETAMINOPHEN 1 TABLET: 10; 325 TABLET ORAL at 06:12

## 2022-12-25 RX ADMIN — HYDROCODONE BITARTRATE AND ACETAMINOPHEN 1 TABLET: 10; 325 TABLET ORAL at 11:12

## 2022-12-25 RX ADMIN — ATORVASTATIN CALCIUM 40 MG: 20 TABLET, FILM COATED ORAL at 09:12

## 2022-12-25 RX ADMIN — DULOXETINE 60 MG: 30 CAPSULE, DELAYED RELEASE ORAL at 09:12

## 2022-12-25 NOTE — SUBJECTIVE & OBJECTIVE
Nalini Varma is a 72 y.o. female  with chronic pain, anxiety, COPD on 2 liters of home oxygen, CHF (EF of 38%), HTN, CKD stage 3 and lung adenocarcinoma presenting to the emergency department after rapid response evaluation in the Hematology/Oncology Clinic.  Patiently recently diagnosed with lung adenocarcinoma and last received chemotherapy 1 week ago.  She was going to a follow-up visit today when her blood pressure was noted to be 68/39 in clinic. She is being treated for undifferentiated shock.     Interval History: Pt with improvement yesterday and off pressors for a few hours but again had hypotension requiring low dose pressors overnight. This morning she was on .01 levophed which was weaned off along with initiation of a 1L NS bolus.       Objective:     Vital Signs (Most Recent):  Temp: 97.7 °F (36.5 °C) (12/25/22 1101)  Pulse: 89 (12/25/22 1101)  Resp: 18 (12/25/22 1127)  BP: (!) 122/57 (12/25/22 1101)  SpO2: 100 % (12/25/22 1101)   Vital Signs (24h Range):  Temp:  [97 °F (36.1 °C)-98.7 °F (37.1 °C)] 97.7 °F (36.5 °C)  Pulse:  [] 89  Resp:  [12-30] 18  SpO2:  [92 %-100 %] 100 %  BP: ()/(48-85) 122/57     Weight: 95.8 kg (211 lb 3.2 oz)  Body mass index is 34.09 kg/m².      Intake/Output Summary (Last 24 hours) at 12/25/2022 1458  Last data filed at 12/25/2022 0926  Gross per 24 hour   Intake 1467.18 ml   Output 1025 ml   Net 442.18 ml       Physical Exam  Vitals and nursing note reviewed.   Constitutional:       General: She is not in acute distress.     Appearance: She is obese. She is not ill-appearing.   Cardiovascular:      Rate and Rhythm: Normal rate and regular rhythm.      Pulses: Normal pulses.      Heart sounds: No murmur heard.  Pulmonary:      Effort: Pulmonary effort is normal.      Breath sounds: Wheezing and rhonchi present.   Abdominal:      General: There is distension.      Palpations: Abdomen is soft.      Tenderness: There is no guarding or rebound.   Musculoskeletal:       Right lower leg: Edema present.      Left lower leg: Edema present.   Skin:     Comments: Yellowing of nails   Neurological:      Mental Status: She is alert.   Psychiatric:         Mood and Affect: Mood normal.         Behavior: Behavior normal.     Review of Systems    Vents:       Lines/Drains/Airways       Peripherally Inserted Central Catheter Line  Duration             PICC Triple Lumen 12/23/22 1708 right basilic 1 day              Drain  Duration             Female External Urinary Catheter 12/23/22 1500 1 day              Peripheral Intravenous Line  Duration                  Peripheral IV - Single Lumen 12/23/22 1000 20 G Left Antecubital 2 days         Peripheral IV - Single Lumen 12/23/22 1004 20 G Posterior;Right Hand 2 days                    Significant Labs:    CBC/Anemia Profile:  Recent Labs   Lab 12/24/22  0321 12/25/22  0214   WBC 5.41 4.88   HGB 7.6* 8.0*   HCT 22.5* 23.6*   * 120*   MCV 95 94   RDW 14.6* 14.7*        Chemistries:  Recent Labs   Lab 12/24/22 0321 12/25/22  0214 12/25/22  1325   * 128* 128*   K 3.4* 3.9 3.9    99 100   CO2 21* 21* 21*   BUN 37* 29* 24*   CREATININE 1.6* 1.2 1.0   CALCIUM 6.5* 6.2* 6.4*   ALBUMIN 1.9* 1.8*  --    PROT 4.7* 4.7*  --    BILITOT 0.8 0.3  --    ALKPHOS 92 89  --    ALT 21 21  --    AST 24 17  --    MG 1.6 1.3* 1.6   PHOS 3.3 2.0* 3.8       All pertinent labs within the past 24 hours have been reviewed.    Significant Imaging:  I have reviewed all pertinent imaging results/findings within the past 24 hours.

## 2022-12-25 NOTE — PROGRESS NOTES
[facility] - Intensive Care [facility] Medicine  Progress Note    Patient Name: Nalini Varma  MRN: [id number]  Patient Class: IP- Inpatient   Admission Date: 12/23/2022  Length of Stay: 2 days  Attending Physician: AMBER Austin MD  Primary Care Provider: Yane Sahni MD        Subjective:     Principal Problem:Shock, unspecified        HPI:  Ms. Varma is a 72/F with PMH COPD, chronic hypoxemic respiratory failure (2L O2 via NC), HFrEF (ECHO 05/2021 EF 38%), HTN, HLD, anxiety, CKDIII, recurrent adenocarcinoma of left lung on chemo/XRT, SIADH, anemia who presented to [facility] 12/23 from Oncology Clinic with hypotension.  She reports she has been feeling slightly weaker than usual and has noticed increased loose stool over the past week with brown/black coloration darker than usual.  She notes at baseline she has some loose stool but reports has increased to 2-3x/daily and has mild abdominal tenderness. Denies fevers, chills, CP, palpitations; does note chronic SOB but no significant increase and reports using her oxygen at 2-3L at home.  Upon presentation to Oncology Clinic she was noted to have significantly low blood pressure with BP 68/39 initially.  On repeat evaluation he remained low and she was sent to ED for further evaluation.  ED workup was notable for mild hyponatremia, hypomagnesemia, JONATHAN with creatinine 2.2 (baseline 1.2), and decrease in Hgb to 7.6 (baseline high 8s-9s).  CXR showed L-sided pleural effusion; CT Abd/Pelvis showed air/fluid collection in pelvis unclear if representing perisigmoid abscess vs large diverticulum. She received IV hydration with 600mL NS in total without significant improvement in pressures. She was started on norepinephrine gtt and received piperacillin-tazobactam. Surgery was consulted and hospital medicine contacted for admission.      Overview/Hospital Course:  No notes on file    Interval History: Overnight events noted. On norepinephrine at minimal  dosing this morning. Denies specific complaints; doing much better.    Review of Systems   Constitutional:  Negative for chills and fever.   Respiratory:  Negative for cough and shortness of breath.    Cardiovascular:  Negative for chest pain and palpitations.   Gastrointestinal:  Negative for abdominal pain, nausea and vomiting.   Objective:     Vital Signs (Most Recent):  Temp: 97.8 °F (36.6 °C) (12/25/22 1501)  Pulse: 94 (12/25/22 1501)  Resp: 15 (12/25/22 1501)  BP: (!) 118/56 (12/25/22 1501)  SpO2: 100 % (12/25/22 1501)   Vital Signs (24h Range):  Temp:  [97 °F (36.1 °C)-98.2 °F (36.8 °C)] 97.8 °F (36.6 °C)  Pulse:  [] 94  Resp:  [12-32] 15  SpO2:  [92 %-100 %] 100 %  BP: ()/(48-85) 118/56     Weight: 95.8 kg (211 lb 3.2 oz)  Body mass index is 34.09 kg/m².    Intake/Output Summary (Last 24 hours) at 12/25/2022 1550  Last data filed at 12/25/2022 1505  Gross per 24 hour   Intake 1467.18 ml   Output 1625 ml   Net -157.82 ml      Physical Exam  Vitals and nursing note reviewed.   Constitutional:       General: She is not in acute distress.     Appearance: She is well-developed.   HENT:      Head: Normocephalic and atraumatic.   Eyes:      General:         Right eye: No discharge.         Left eye: No discharge.      Conjunctiva/sclera: Conjunctivae normal.   Cardiovascular:      Rate and Rhythm: Normal rate.      Pulses: Normal pulses.   Pulmonary:      Effort: Pulmonary effort is normal. No respiratory distress.      Breath sounds: Wheezing present.   Abdominal:      Palpations: Abdomen is soft.      Tenderness: There is no abdominal tenderness.   Musculoskeletal:         General: Normal range of motion.      Right lower leg: No edema.      Left lower leg: No edema.   Skin:     General: Skin is warm and dry.   Neurological:      Mental Status: She is alert and oriented to person, place, and time.       Significant Labs:   CBC:  Recent Labs   Lab 12/23/22  1015 12/23/22  1028 12/24/22  0321  12/25/22  0214   WBC 7.65  --  5.41 4.88   HGB 7.6*  --  7.6* 8.0*   HCT 22.8* 23* 22.5* 23.6*   *  --  128* 120*   GRAN 70.0  5.4  --  67.2  3.6 61.3  3.0   LYMPH 7.1*  0.5*  --  8.7*  0.5* 15.4*  0.8*   MONO 21.7*  1.7*  --  23.1*  1.3* 22.3*  1.1*   EOS 0.0  --  0.0 0.0   BASO 0.02  --  0.01 0.01   CMP:  Recent Labs   Lab 12/23/22  1015 12/24/22  0321 12/25/22 0214 12/25/22  1325   * 128* 128* 128*   K 3.7 3.4* 3.9 3.9   CL 98 100 99 100   CO2 20* 21* 21* 21*   BUN 39* 37* 29* 24*   CREATININE 2.1* 1.6* 1.2 1.0   GLU 86 96 94 108   CALCIUM 6.9* 6.5* 6.2* 6.4*   MG 1.0* 1.6 1.3* 1.6   PHOS 3.5 3.3 2.0* 3.8   ALKPHOS 87 92 89  --    AST 27 24 17  --    ALT 23 21 21  --    BILITOT 0.4 0.8 0.3  --    PROT 5.2* 4.7* 4.7*  --    ALBUMIN 2.2* 1.9* 1.8*  --    ANIONGAP 11 7* 8 7*     Recent Labs   Lab 12/23/22  1015 12/24/22 2001   * 550*     Significant Imaging:   No new imaging this morning.      Assessment/Plan:      * Shock, unspecified  - Shock presumed septic in setting of ?intra-abdominal fluid collection / loose stool / hypotension not responsive to fluids alone.  - Continue piperacillin-tazobactam 4.5g IV q8hr for potential intra-abdominal source. Surgery consulted in ED; suspect this represents a large diverticula rather than abscess. If fails to improve will consider potential drainage but patient wishes to avoid surgical intervention and this seems very reasonable.  - Stool studies in process. C difficile canceled after no BM in 24hr.  - Echo 12/24 shows EF 55%, grade II diastolic dysfunction. BNP elevated but clinically does not appear overloaded.  - Give additional 1L NS this AM at slow rate; highest suspicion remains hypovolemia as cause.  - Wean norepinephrine gtt as feasible.  - Appreciate pulm/crit assistance.    Anemia of chronic disease  - Anemia in setting of malignancy / chemo / XRT. Patient reports darker stool, though was occult negative from stool produced in ED.  -  Transfused 1U pRBCs 12/23; not significantly changed, but no evidence of acute bleeding or symptoms of anemia at this time.  - Trend Hgb.    JONATHAN (acute kidney injury)  CKDIII  - JONATHAN likely secondary to GI losses / dehydration.  - Improved with IVFs; monitor with PO intake.    Recurrent adenocarcinoma of left lung  - Lung adenocarcinoma with recurrence undergoing current XRT/chemo with Dr. Gilmore.  - Palliative consulted, appreciate assistance.    Syndrome of inappropriate secretion of antidiuretic hormone  - Mild hyponatremia with h/o SIADH; monitor.    Chronic combined systolic and diastolic CHF (congestive heart failure)  - Last echo 05/2021 showed EF 38%.  - Appears volume depleted; monitor closely in setting of shock / fluid resuscitation.  - Hold diuretics in setting of shock / JONATHAN.    Generalized anxiety disorder  - Continue alprazolam 0.25mg PO daily PRN.    Hyperlipidemia  - Continue atorvastatin 40mg PO daily.    Essential hypertension  - Hold antihypertensives in setting of shock.    Chronic obstructive pulmonary disease  Chronic hypoxemic resp failure  - Continue fluticasone-vilanterol 100-25mcg inhaled daily, tiotropium 5mcg inhaled daily, albuterol-ipratropium 2.5-0.5mg inhaled q4hr PRN.  - Continue supplemental O2; uses 2L at baseline.    VTE Risk Mitigation (From admission, onward)         Ordered     enoxaparin injection 40 mg  Daily         12/24/22 1256     Reason for No Pharmacological VTE Prophylaxis  Once        Question:  Reasons:  Answer:  Risk of Bleeding    12/23/22 1609     IP VTE HIGH RISK PATIENT  Once         12/23/22 1609     Place sequential compression device  Until discontinued         12/23/22 1609                Discharge Planning   FELICIANO:      Code Status: Full Code   Is the patient medically ready for discharge?:     Reason for patient still in hospital (select all that apply): Treatment  Discharge Plan A: Home        D Cornelio Austin MD  Department of Hospital Medicine   [facility]  Intensive Care ([place])

## 2022-12-25 NOTE — ASSESSMENT & PLAN NOTE
Presented with shock from clinic, asymptomatic. Recent diarrheal illness she associated with chemo+magnesium replacement. Mostly likely related to intravascular depletion as she has been very fluid responsive and improved EF on TTE yesterday. Will bolus 1L one more time today  - Pressors    - currently on levophed .01, turned off again this morning  - Sepsis    - pt w/ diarrheal illness, studies pending including c. Diff w/ decreased liquid stooling    - initial LA 3.9, now down to 1     - cultures pending  - Hypovolemic    - resuscitated with IVFs + `1 unit pRBC for possible hemorrhagic causes    - no evidence of GI bleed or other blood loss    - possible fluid down 2/2 to diarrhea with JONATHAN that resolved with fluids    - will continue resuscitation  - Cardiogenic    - previous TTE 38% in May 2021    - now with EF 55% and otherwise normal TTE    - unlikely cardiogenic

## 2022-12-25 NOTE — PLAN OF CARE
Plan of care reviewed with pt. Pt voiced understanding. NSR on monitor. Pt on room air, O2 sat > 93%. No acute distress noted. Pt c/o stephenie to back and leg, Norco given, moderate relief obtained. Levophed stopped at 1658, pt's MAP >65. Pt's MAP now 52. MD notified, 500mL bolus NS ordered. Will continue to monitor MAP.     Side rails X2, bed in lowest position, call bell within reach, pt advised to call for assistance. Maintain bed alarm for pt safety.

## 2022-12-25 NOTE — CONSULTS
[facility] Intensive Care [facility])  Pulmonology  Consult Note    Patient Name: Nalini Varma  MRN: [id number]  Admission Date: 12/23/2022  Hospital Length of Stay: 2 days  Code Status: Full Code  Attending Physician: AMBER Austin MD  Primary Care Provider: Yane Sahni MD   Principal Problem: Shock, unspecified    Inpatient consult to Pulmonary Critical Care  Consult performed by: Justin Ellerman, MD  Consult ordered by: AMBER Austin MD        Subjective:     HPI:  Nalini Varma is a 72 y.o. female  with chronic pain, anxiety, COPD on 2 liters of home oxygen, CHF (EF of 38%), HTN, CKD stage 3 and lung adenocarcinoma presenting to the emergency department after rapid response evaluation in the Hematology/Oncology Clinic.  Patiently recently diagnosed with lung adenocarcinoma and last received chemotherapy 1 week ago.  She was going to a follow-up visit today when her blood pressure was noted to be 68/39 in clinic.  Repeat blood pressures within 20 minutes was 74/43 and 68/39.  Patient was sitting in her wheelchair during this time.  She states she did not feel lightheaded.  No episodes of syncope.  She is currently reporting chronic pain to her left leg and right great toe pain after a blister popped a few days ago.  She does report nocturnal diarrhea for the past week.  Approximately 1-2 episodes of watery, dark red/brown stool per night.  She denies abdominal pain or vomiting.  She is reporting urinary frequency but with subjective decreased urinary output.  She denies headache, lightheadedness, chest pain or shortness of breath.  No recent antibiotic use.     After resuscitation, the patient still required a small dose of pressors so ICU was consulted for further assistance.      Past Medical History:   Diagnosis Date    Anxiety     Cervical spinal stenosis     Choledocholithiasis     Chronic obstructive pulmonary disease 03/16/2016    Degenerative disc disease of cervical spine      Diverticulosis 04/24/2018    History of alcohol abuse 03/02/2021    History of gastric ulcer     History of L3 compression fracture 12/19/2018    History of lung cancer     s/p completion of XRT in 10/2020    Hyperlipidemia     Iron deficiency anemia     Osteoarthritis     Stage III CKD 2017       Past Surgical History:   Procedure Laterality Date    BREAST CYST EXCISION Right     1967    CATARACT EXTRACTION      COLONOSCOPY  2015    COLONOSCOPY N/A 6/8/2022    Procedure: COLONOSCOPY;  Surgeon: Helio Cheema MD;  Location: [facility] (2ND FLR);  Service: Endoscopy;  Laterality: N/A;  5/25-Pt requesting Dr. Cheema-approval given per Dr. Cheema-ml  Fully vaccinated, prep instr portal -ml    CORONARY ANGIOGRAPHY N/A 7/20/2018    Procedure: ANGIOGRAM, CORONARY ARTERY;  Surgeon: Willard Roberts MD;  Location: [facility] CATH LAB;  Service: Cardiovascular;  Laterality: N/A;    ENDOSCOPIC ULTRASOUND OF UPPER GASTROINTESTINAL TRACT N/A 3/24/2021    Procedure: ULTRASOUND, UPPER GI TRACT, ENDOSCOPIC;  Surgeon: Helio Cheema MD;  Location: [facility] (2ND FLR);  Service: Endoscopy;  Laterality: N/A;    ENDOSCOPIC ULTRASOUND OF UPPER GASTROINTESTINAL TRACT N/A 4/25/2022    Procedure: ULTRASOUND, UPPER GI TRACT, ENDOSCOPIC;  Surgeon: Helio Cheema MD;  Location: [facility] (2ND FLR);  Service: Endoscopy;  Laterality: N/A;  cardiac risk assessment 1 per Dr. Ortez. see t/e 3/17-SC  3/25:new instructions via portal. home with medical transport?-SC    ERCP N/A 3/24/2021    Procedure: ERCP (ENDOSCOPIC RETROGRADE CHOLANGIOPANCREATOGRAPHY);  Surgeon: Hleio Cheema MD;  Location: [facility] (2ND FLR);  Service: Endoscopy;  Laterality: N/A;    ERCP N/A 4/30/2021    Procedure: ERCP (ENDOSCOPIC RETROGRADE CHOLANGIOPANCREATOGRAPHY);  Surgeon: Boo Gray MD;  Location: [facility] (2ND FLR);  Service: Endoscopy;  Laterality: N/A;    ERCP N/A 7/1/2021    Procedure: ERCP (ENDOSCOPIC RETROGRADE  CHOLANGIOPANCREATOGRAPHY);  Surgeon: Helio Cheema MD;  Location: [facility] (2ND FLR);  Service: Endoscopy;  Laterality: N/A;  Ok for Taxi. Dr Cheema  rapid covid 1130am- tb inst email    ESOPHAGOGASTRODUODENOSCOPY  2015    ESOPHAGOGASTRODUODENOSCOPY N/A 3/4/2021    Procedure: EGD (ESOPHAGOGASTRODUODENOSCOPY);  Surgeon: Yenifer Ramirez MD;  Location: [facility];  Service: Endoscopy;  Laterality: N/A;    ESOPHAGOGASTRODUODENOSCOPY N/A 3/24/2021    Procedure: EGD (ESOPHAGOGASTRODUODENOSCOPY);  Surgeon: Helio Cheema MD;  [facility]  Service: Endoscopy;  Laterality: N/A;    EYE SURGERY  2016    Cataracts    FRACTURE SURGERY  2014    JOINT REPLACEMENT  2007    ORIF FEMUR FRACTURE Left     TOTAL KNEE ARTHROPLASTY Left 2007    TUBAL LIGATION         Review of patient's allergies indicates:   Allergen Reactions    Wellbutrin [bupropion hcl] Other (See Comments)     Hyponatremia and hypomagnesia     Zoloft [sertraline] Other (See Comments)     Hyponatremia and hypomagnesia     Bananas [banana]      Emesis, and stomach cramps    Patient states she is not allergic to Banana       Family History       Problem Relation (Age of Onset)    Alzheimer's disease Mother    Arthritis Father, Brother    Cancer Sister    Colon cancer Brother    Dementia Mother    Depression Mother    Hypertension Mother, Brother    Miscarriages / Stillbirths Sister    Myasthenia gravis Father    No Known Problems Son    Rectal cancer Father          Tobacco Use    Smoking status: Some Days     Packs/day: 1.00     Years: 53.00     Pack years: 53.00     Types: Cigarettes     Start date: 4/30/1967    Smokeless tobacco: Never    Tobacco comments:     I had quit for about 6 months this past year. Then massive stress and started back up sometimes   Substance and Sexual Activity    Alcohol use: Yes     Alcohol/week: 7.0 standard drinks     Types: 7 Drinks containing 0.5 oz of alcohol per week     Comment: at least  1 cocktail a day    Drug use: No    Sexual activity: Not Currently     Partners: Male     Birth control/protection: Abstinence, Post-menopausal         Review of Systems   Constitutional:  Negative for chills and fever.   HENT:  Positive for postnasal drip and sinus pressure.    Respiratory:  Positive for wheezing. Negative for shortness of breath.    Cardiovascular:  Positive for leg swelling. Negative for chest pain.   Gastrointestinal:  Positive for diarrhea. Negative for nausea and vomiting.   Musculoskeletal:  Positive for arthralgias (chronic) and back pain (chronic).   Neurological:  Negative for syncope and weakness.   Psychiatric/Behavioral:  Negative for agitation, confusion and hallucinations.    Objective:     Vital Signs (Most Recent):  Temp: 98.2 °F (36.8 °C) (12/24/22 0701)  Pulse: 77 (12/24/22 0801)  Resp: (!) 34 (12/24/22 0801)  BP: (!) 148/67 (12/24/22 0801)  SpO2: (!) 91 % (12/24/22 0801)   Vital Signs (24h Range):  Temp:  [97.8 °F (36.6 °C)-99 °F (37.2 °C)] 98.2 °F (36.8 °C)  Pulse:  [] 77  Resp:  [11-75] 34  SpO2:  [72 %-100 %] 91 %  BP: ()/(28-87) 148/67     Weight: 95.8 kg (211 lb 3.2 oz)  Body mass index is 34.09 kg/m².      Intake/Output Summary (Last 24 hours) at 12/24/2022 0900  Last data filed at 12/24/2022 0700  Gross per 24 hour   Intake 3285.52 ml   Output 700 ml   Net 2585.52 ml       Physical Exam  Vitals and nursing note reviewed.   Constitutional:       General: She is not in acute distress.     Appearance: She is obese. She is not ill-appearing.   HENT:      Head: Normocephalic.   Eyes:      General: No scleral icterus.  Cardiovascular:      Rate and Rhythm: Normal rate and regular rhythm.      Pulses: Normal pulses.      Heart sounds: No murmur heard.  Pulmonary:      Effort: Pulmonary effort is normal.      Breath sounds: Wheezing and rhonchi present.   Abdominal:      General: There is distension.      Palpations: Abdomen is soft.      Tenderness: There is no  guarding or rebound.   Musculoskeletal:      Right lower leg: Edema present.      Left lower leg: Edema present.   Skin:     General: Skin is warm and dry.      Comments: Yellowing of nails   Neurological:      General: No focal deficit present.      Mental Status: She is alert and oriented to person, place, and time.   Psychiatric:         Mood and Affect: Mood normal.         Behavior: Behavior normal.       Vents:       Lines/Drains/Airways       Peripherally Inserted Central Catheter Line  Duration             PICC Triple Lumen 12/23/22 1708 right basilic <1 day              Drain  Duration             Female External Urinary Catheter 12/23/22 1500 <1 day              Peripheral Intravenous Line  Duration                  Peripheral IV - Single Lumen 12/23/22 1000 20 G Left Antecubital <1 day         Peripheral IV - Single Lumen 12/23/22 1004 20 G Posterior;Right Hand <1 day                    Significant Labs:    CBC/Anemia Profile:  Recent Labs   Lab 12/23/22  0744 12/23/22  1015 12/23/22  1028 12/24/22  0321   WBC 8.54 7.65  --  5.41   HGB 8.1* 7.6*  --  7.6*   HCT 24.7* 22.8* 23* 22.5*   * 138*  --  128*   MCV 97 97  --  95   RDW 14.3 14.4  --  14.6*        Chemistries:  Recent Labs   Lab 12/23/22  0744 12/23/22  1015 12/24/22  0321   * 129* 128*   K 4.6 3.7 3.4*   CL 97 98 100   CO2 18* 20* 21*   BUN 39* 39* 37*   CREATININE 2.2* 2.1* 1.6*   CALCIUM 7.7* 6.9* 6.5*   ALBUMIN 2.4* 2.2* 1.9*   PROT 5.7* 5.2* 4.7*   BILITOT 0.4 0.4 0.8   ALKPHOS 93 87 92   ALT 24 23 21   AST 31 27 24   MG 1.0* 1.0* 1.6   PHOS  --  3.5 3.3       All pertinent labs within the past 24 hours have been reviewed.    Significant Imaging:   I have reviewed all pertinent imaging results/findings within the past 24 hours.    NM PET CT 7/22:  .1 cm left upper lobe nodule with minimal tracer uptake.  Difficult to characterize on PET due to small size although remains concerning for neoplasm considering increase in size over  prior CTs.  Additional stable subcentimeter left upper lobe nodule which is too small to characterize with PET.     Stable bandlike opacity and volume loss in the right upper lobe with low level radiotracer uptake.  Findings favored to represent post treatment changes.     Borderline mediastinal lymphadenopathy with radiotracer uptake similar to blood pool.  Findings could represent reactive etiology or metastasis.    CT Chest 10/28:  Stable right upper lobe consolidative opacity with volume loss and architectural distortion consistent post fibrotic changes.     Interval enlargement of a subaortic lymph node, now 16mm; previously 12mm.     Index left upper lobe nodule 10.4 mm, previously 10.4mm  Mm.     Index left upper lobe nodule 3.8 mm, previous 3.8 mm.    CT AP 12/23:  Air/fluid collection in the pelvis which is inseparable from the sigmoid colon and adjacent uterus.  Unclear if this finding represents air and fluid within a large diverticulum or true perisigmoid abscess.  Suggest correlation for any symptoms of sigmoid diverticulitis or colitis.  Follow-up CT with IV and/or enteric contrast may provide improved sensitivity when clinically warranted.     Liquid stool in the colon.  Mild rectal wall thickening.     Bilateral perinephric fat stranding and bilateral hyperdense renal lesions, similar to prior studies.    Assessment/Plan:     * Shock, unspecified  Pt presents with shock from clinic, asymptomatic. Recent diarrheal illness she associated with chemo+magnesium replacement. Unclear etiology at this time w/ adequate fluid resuscitation.  - Pressors    - currently on levophed .03 with adequate pressures    - will continue to wean as tolerated  - Sepsis    - pt w/ diarrheal illness, studies pending including c. Diff w/ decreased liquid stooling    - initial LA 3.9, now down to 1    - cultures pending  - Hypovolemic    - resuscitated with IVFs + `1 unit pRBC for possible hemorrhagic causes    - no evidence of  GI bleed or other blood loss    - possible fluid down 2/2 to diarrhea with JONATHAN that resolved with fluids  - Cardiogenic    - previous TTE 38% in May 2021    - appears to be perfusing will with mild LE edema    - will repeat TTE today as she is s/p chemo and radiation since her last echo     Recurrent adenocarcinoma of left lung  H/o of stage I adenocarcinoma in 2020, non-surgical candidate, treated with SBRT in 10/2020. Followed closely with surveillance, now with new JASSI lesions s/p PET scan and initiation of Chemo/limited XRT that began in November 2022 for recurrent adenocarcinoma. (unable to obtain a biopsy of the recurrence)  - no intervention needed  - she should continue to follow her therapy when out of the hospital if she can tolerate it    CKD (chronic kidney disease) stage 3, GFR 30-59 ml/min  JONATHAN on CKD, basline 1.6  - returned to baseline today after fluids + blood    Chronic combined systolic and diastolic CHF (congestive heart failure)  Patient is identified as having Combined Systolic and Diastolic heart failure that is Chronic. CHF is currently controlled. Latest ECHO performed and demonstrates- Results for orders placed during the hospital encounter of 04/29/21    Echo Color Flow Doppler? Yes    Interpretation Summary  · The left ventricle is normal in size with moderately decreased systolic function.  · The estimated ejection fraction is 38%.  · There are segmental left ventricular wall motion abnormalities.  · Left ventricular diastolic dysfunction.  · Normal right ventricular size with low normal right ventricular systolic function.  · Mild right atrial enlargement.  · There is mild aortic valve stenosis.  · Aortic valve area is 2.00 cm2; peak velocity is 1.91 m/s; mean gradient is 10 mmHg.  · Normal central venous pressure (3 mmHg).  . Continue Beta Blocker, ACE/ARB and Furosemide and monitor clinical status closely. Monitor on telemetry. Patient is off CHF pathway.  Monitor strict Is&Os and  daily weights.  Place on fluid restriction of 1.5 L. Continue to stress to patient importance of self efficacy and  on diet for CHF. Last BNP reviewed- and noted below   Recent Labs   Lab 12/23/22  1015   *   .      Chronic obstructive pulmonary disease  Current smoker 1PPD. H/o 100 pack years. PFTs 2020 show mild obstruction, mild restriction, moderate DLCO reduction. On NoroEllipta + Albuterol PRN at home at 2L NC.   - continue breathing treatments and controller medications while inpatient  - wean to 2L NC or RA which is her baseline          Thank you for your consult.     Justin Ellerman, MD  Pulmonology  [facility] ([place])

## 2022-12-25 NOTE — ASSESSMENT & PLAN NOTE
- Shock presumed septic in setting of ?intra-abdominal fluid collection / loose stool / hypotension not responsive to fluids alone.  - Continue piperacillin-tazobactam 4.5g IV q8hr for potential intra-abdominal source. Surgery consulted in ED; suspect this represents a large diverticula rather than abscess. If fails to improve will consider potential drainage but patient wishes to avoid surgical intervention and this seems very reasonable.  - Stool studies in process. C difficile canceled after no BM in 24hr.  - Echo 12/24 shows EF 55%, grade II diastolic dysfunction. BNP elevated but clinically does not appear overloaded.  - Give additional 1L NS this AM at slow rate; highest suspicion remains hypovolemia as cause.  - Wean norepinephrine gtt as feasible.  - Appreciate pulm/crit assistance.

## 2022-12-25 NOTE — EICU
Alerted by bedside regarding patient with tachycardia ( Heart rate 126) and low BP 72/ 49     Patient has baseline COPD and is wheezing (per patient, this is baseline)     NS bolus given today in hopes of staying off of pressor support.   Patient is off of oxygen and looks comfortable per bedside.     Plan -   Repeat BNP (recentn was 241)   NS bolus 1 litre   Restart levo/ vasopressin   Xopenex nebulization     Follow up after bolus       ADDENDUM  Repeat    Bolus held   On levophed - minimum dose and doing well   Improved tachycardia and breath sounds after xopenex nebs   Current heart rate 94/ sinus   / 55     ADDENDUM   Alerted by bedside regarding   Mg 1.3 - replace  Phos 2.0 - replace   Calcium 6.2 - replace

## 2022-12-25 NOTE — PROGRESS NOTES
[facility] [place] [facility]  Pulmonology  Progress Note    Patient Name: Nalini Varma  MRN: [id number]  Admission Date: 12/23/2022  Hospital Length of Stay: 2 days  Code Status: Full Code  Attending Provider: AMBER Austin MD  Primary Care Provider: Yane Sahni MD   Principal Problem: Shock, unspecified    Subjective:     Nalini Varma is a 72 y.o. female  with chronic pain, anxiety, COPD on 2 liters of home oxygen, CHF (EF of 38%), HTN, CKD stage 3 and lung adenocarcinoma presenting to the emergency department after rapid response evaluation in the Hematology/Oncology Clinic.  Patiently recently diagnosed with lung adenocarcinoma and last received chemotherapy 1 week ago.  She was going to a follow-up visit today when her blood pressure was noted to be 68/39 in clinic. She is being treated for undifferentiated shock.     Interval History: Pt with improvement yesterday and off pressors for a few hours but again had hypotension requiring low dose pressors overnight. This morning she was on .01 levophed which was weaned off along with initiation of a 1L NS bolus.       Objective:     Vital Signs (Most Recent):  Temp: 97.7 °F (36.5 °C) (12/25/22 1101)  Pulse: 89 (12/25/22 1101)  Resp: 18 (12/25/22 1127)  BP: (!) 122/57 (12/25/22 1101)  SpO2: 100 % (12/25/22 1101)   Vital Signs (24h Range):  Temp:  [97 °F (36.1 °C)-98.7 °F (37.1 °C)] 97.7 °F (36.5 °C)  Pulse:  [] 89  Resp:  [12-30] 18  SpO2:  [92 %-100 %] 100 %  BP: ()/(48-85) 122/57     Weight: 95.8 kg (211 lb 3.2 oz)  Body mass index is 34.09 kg/m².      Intake/Output Summary (Last 24 hours) at 12/25/2022 1458  Last data filed at 12/25/2022 0926  Gross per 24 hour   Intake 1467.18 ml   Output 1025 ml   Net 442.18 ml       Physical Exam  Vitals and nursing note reviewed.   Constitutional:       General: She is not in acute distress.     Appearance: She is obese. She is not ill-appearing.   Cardiovascular:      Rate and Rhythm: Normal  rate and regular rhythm.      Pulses: Normal pulses.      Heart sounds: No murmur heard.  Pulmonary:      Effort: Pulmonary effort is normal.      Breath sounds: Wheezing and rhonchi present.   Abdominal:      General: There is distension.      Palpations: Abdomen is soft.      Tenderness: There is no guarding or rebound.   Musculoskeletal:      Right lower leg: Edema present.      Left lower leg: Edema present.   Skin:     Comments: Yellowing of nails   Neurological:      Mental Status: She is alert.   Psychiatric:         Mood and Affect: Mood normal.         Behavior: Behavior normal.     Review of Systems    Vents:       Lines/Drains/Airways       Peripherally Inserted Central Catheter Line  Duration             PICC Triple Lumen 12/23/22 1708 right basilic 1 day              Drain  Duration             Female External Urinary Catheter 12/23/22 1500 1 day              Peripheral Intravenous Line  Duration                  Peripheral IV - Single Lumen 12/23/22 1000 20 G Left Antecubital 2 days         Peripheral IV - Single Lumen 12/23/22 1004 20 G Posterior;Right Hand 2 days                    Significant Labs:    CBC/Anemia Profile:  Recent Labs   Lab 12/24/22  0321 12/25/22  0214   WBC 5.41 4.88   HGB 7.6* 8.0*   HCT 22.5* 23.6*   * 120*   MCV 95 94   RDW 14.6* 14.7*        Chemistries:  Recent Labs   Lab 12/24/22  0321 12/25/22  0214 12/25/22  1325   * 128* 128*   K 3.4* 3.9 3.9    99 100   CO2 21* 21* 21*   BUN 37* 29* 24*   CREATININE 1.6* 1.2 1.0   CALCIUM 6.5* 6.2* 6.4*   ALBUMIN 1.9* 1.8*  --    PROT 4.7* 4.7*  --    BILITOT 0.8 0.3  --    ALKPHOS 92 89  --    ALT 21 21  --    AST 24 17  --    MG 1.6 1.3* 1.6   PHOS 3.3 2.0* 3.8       All pertinent labs within the past 24 hours have been reviewed.    Significant Imaging:  I have reviewed all pertinent imaging results/findings within the past 24 hours.    Assessment/Plan:     * Shock, unspecified  Presented with shock from clinic,  asymptomatic. Recent diarrheal illness she associated with chemo+magnesium replacement. Mostly likely related to intravascular depletion as she has been very fluid responsive and improved EF on TTE yesterday. Will bolus 1L one more time today  - Pressors    - currently on levophed .01, turned off again this morning  - Sepsis    - pt w/ diarrheal illness, studies pending including c. Diff w/ decreased liquid stooling    - initial LA 3.9, now down to 1     - cultures pending  - Hypovolemic    - resuscitated with IVFs + `1 unit pRBC for possible hemorrhagic causes    - no evidence of GI bleed or other blood loss    - possible fluid down 2/2 to diarrhea with JONATHAN that resolved with fluids    - will continue resuscitation  - Cardiogenic    - previous TTE 38% in May 2021    - now with EF 55% and otherwise normal TTE    - unlikely cardiogenic     Recurrent adenocarcinoma of left lung  H/o of stage I adenocarcinoma in 2020, non-surgical candidate, treated with SBRT in 10/2020. Followed closely with surveillance, now with new JASSI lesions s/p PET scan and initiation of Chemo/limited XRT that began in November 2022 for recurrent adenocarcinoma. (unable to obtain a biopsy of the recurrence)  - no intervention needed  - she should continue to follow her therapy when out of the hospital if she can tolerate it    CKD (chronic kidney disease) stage 3, GFR 30-59 ml/min  JONATHAN on CKD, basline 1.6 but now improved to 1.2  - improved over her baseline    Chronic obstructive pulmonary disease  Current smoker 1PPD. H/o 100 pack years. PFTs 2020 show mild obstruction, mild restriction, moderate DLCO reduction. On NoroEllipta + Albuterol PRN at home at 2L NC.   - continue breathing treatments and controller medications while inpatient  - wean to 2L NC or RA which is her baseline      Pt likely okay to transfer to the floor if her BP remains stable after her 1L NS bolus/4 hours this morning.      Justin Ellerman, MD  Pulmonology  [facility] -  Intensive Care ([place])

## 2022-12-25 NOTE — SUBJECTIVE & OBJECTIVE
Interval History: Overnight events noted. On norepinephrine at minimal dosing this morning. Denies specific complaints; doing much better.    Review of Systems   Constitutional:  Negative for chills and fever.   Respiratory:  Negative for cough and shortness of breath.    Cardiovascular:  Negative for chest pain and palpitations.   Gastrointestinal:  Negative for abdominal pain, nausea and vomiting.   Objective:     Vital Signs (Most Recent):  Temp: 97.8 °F (36.6 °C) (12/25/22 1501)  Pulse: 94 (12/25/22 1501)  Resp: 15 (12/25/22 1501)  BP: (!) 118/56 (12/25/22 1501)  SpO2: 100 % (12/25/22 1501)   Vital Signs (24h Range):  Temp:  [97 °F (36.1 °C)-98.2 °F (36.8 °C)] 97.8 °F (36.6 °C)  Pulse:  [] 94  Resp:  [12-32] 15  SpO2:  [92 %-100 %] 100 %  BP: ()/(48-85) 118/56     Weight: 95.8 kg (211 lb 3.2 oz)  Body mass index is 34.09 kg/m².    Intake/Output Summary (Last 24 hours) at 12/25/2022 1550  Last data filed at 12/25/2022 1505  Gross per 24 hour   Intake 1467.18 ml   Output 1625 ml   Net -157.82 ml      Physical Exam  Vitals and nursing note reviewed.   Constitutional:       General: She is not in acute distress.     Appearance: She is well-developed.   HENT:      Head: Normocephalic and atraumatic.   Eyes:      General:         Right eye: No discharge.         Left eye: No discharge.      Conjunctiva/sclera: Conjunctivae normal.   Cardiovascular:      Rate and Rhythm: Normal rate.      Pulses: Normal pulses.   Pulmonary:      Effort: Pulmonary effort is normal. No respiratory distress.      Breath sounds: Wheezing present.   Abdominal:      Palpations: Abdomen is soft.      Tenderness: There is no abdominal tenderness.   Musculoskeletal:         General: Normal range of motion.      Right lower leg: No edema.      Left lower leg: No edema.   Skin:     General: Skin is warm and dry.   Neurological:      Mental Status: She is alert and oriented to person, place, and time.       Significant Labs:    CBC:  Recent Labs   Lab 12/23/22  1015 12/23/22  1028 12/24/22  0321 12/25/22  0214   WBC 7.65  --  5.41 4.88   HGB 7.6*  --  7.6* 8.0*   HCT 22.8* 23* 22.5* 23.6*   *  --  128* 120*   GRAN 70.0  5.4  --  67.2  3.6 61.3  3.0   LYMPH 7.1*  0.5*  --  8.7*  0.5* 15.4*  0.8*   MONO 21.7*  1.7*  --  23.1*  1.3* 22.3*  1.1*   EOS 0.0  --  0.0 0.0   BASO 0.02  --  0.01 0.01   CMP:  Recent Labs   Lab 12/23/22  1015 12/24/22  0321 12/25/22  0214 12/25/22  1325   * 128* 128* 128*   K 3.7 3.4* 3.9 3.9   CL 98 100 99 100   CO2 20* 21* 21* 21*   BUN 39* 37* 29* 24*   CREATININE 2.1* 1.6* 1.2 1.0   GLU 86 96 94 108   CALCIUM 6.9* 6.5* 6.2* 6.4*   MG 1.0* 1.6 1.3* 1.6   PHOS 3.5 3.3 2.0* 3.8   ALKPHOS 87 92 89  --    AST 27 24 17  --    ALT 23 21 21  --    BILITOT 0.4 0.8 0.3  --    PROT 5.2* 4.7* 4.7*  --    ALBUMIN 2.2* 1.9* 1.8*  --    ANIONGAP 11 7* 8 7*     Recent Labs   Lab 12/23/22  1015 12/24/22  2001   * 550*     Significant Imaging:   No new imaging this morning.

## 2022-12-26 LAB
ALBUMIN SERPL BCP-MCNC: 1.8 G/DL (ref 3.5–5.2)
ALP SERPL-CCNC: 86 U/L (ref 55–135)
ALT SERPL W/O P-5'-P-CCNC: 15 U/L (ref 10–44)
ANION GAP SERPL CALC-SCNC: 5 MMOL/L (ref 8–16)
AST SERPL-CCNC: 14 U/L (ref 10–40)
BASOPHILS # BLD AUTO: 0.01 K/UL (ref 0–0.2)
BASOPHILS NFR BLD: 0.3 % (ref 0–1.9)
BILIRUB SERPL-MCNC: 0.3 MG/DL (ref 0.1–1)
BUN SERPL-MCNC: 22 MG/DL (ref 8–23)
CA-I BLDV-SCNC: 1.07 MMOL/L (ref 1.06–1.42)
CALCIUM SERPL-MCNC: 7.3 MG/DL (ref 8.7–10.5)
CHLORIDE SERPL-SCNC: 104 MMOL/L (ref 95–110)
CO2 SERPL-SCNC: 22 MMOL/L (ref 23–29)
CREAT SERPL-MCNC: 1 MG/DL (ref 0.5–1.4)
CRYPTOSP AG STL QL IA: NEGATIVE
DIFFERENTIAL METHOD: ABNORMAL
EOSINOPHIL # BLD AUTO: 0 K/UL (ref 0–0.5)
EOSINOPHIL NFR BLD: 0.3 % (ref 0–8)
ERYTHROCYTE [DISTWIDTH] IN BLOOD BY AUTOMATED COUNT: 14.8 % (ref 11.5–14.5)
ERYTHROCYTE [DISTWIDTH] IN BLOOD BY AUTOMATED COUNT: 15 % (ref 11.5–14.5)
EST. GFR  (NO RACE VARIABLE): 59 ML/MIN/1.73 M^2
G LAMBLIA AG STL QL IA: NEGATIVE
GLUCOSE SERPL-MCNC: 83 MG/DL (ref 70–110)
HCT VFR BLD AUTO: 20 % (ref 37–48.5)
HCT VFR BLD AUTO: 22.7 % (ref 37–48.5)
HGB BLD-MCNC: 6.8 G/DL (ref 12–16)
HGB BLD-MCNC: 7.5 G/DL (ref 12–16)
IMM GRANULOCYTES # BLD AUTO: 0.03 K/UL (ref 0–0.04)
IMM GRANULOCYTES NFR BLD AUTO: 0.9 % (ref 0–0.5)
LYMPHOCYTES # BLD AUTO: 0.6 K/UL (ref 1–4.8)
LYMPHOCYTES NFR BLD: 16.9 % (ref 18–48)
MAGNESIUM SERPL-MCNC: 1.6 MG/DL (ref 1.6–2.6)
MCH RBC QN AUTO: 31.6 PG (ref 27–31)
MCH RBC QN AUTO: 32.2 PG (ref 27–31)
MCHC RBC AUTO-ENTMCNC: 33 G/DL (ref 32–36)
MCHC RBC AUTO-ENTMCNC: 34 G/DL (ref 32–36)
MCV RBC AUTO: 95 FL (ref 82–98)
MCV RBC AUTO: 96 FL (ref 82–98)
MONOCYTES # BLD AUTO: 0.8 K/UL (ref 0.3–1)
MONOCYTES NFR BLD: 23.4 % (ref 4–15)
NEUTROPHILS # BLD AUTO: 2 K/UL (ref 1.8–7.7)
NEUTROPHILS NFR BLD: 58.2 % (ref 38–73)
NRBC BLD-RTO: 0 /100 WBC
PHOSPHATE SERPL-MCNC: 3.5 MG/DL (ref 2.7–4.5)
PLATELET # BLD AUTO: 132 K/UL (ref 150–450)
PLATELET # BLD AUTO: 134 K/UL (ref 150–450)
PMV BLD AUTO: 10.1 FL (ref 9.2–12.9)
PMV BLD AUTO: 9.7 FL (ref 9.2–12.9)
POTASSIUM SERPL-SCNC: 4.3 MMOL/L (ref 3.5–5.1)
PROT SERPL-MCNC: 4.7 G/DL (ref 6–8.4)
RBC # BLD AUTO: 2.11 M/UL (ref 4–5.4)
RBC # BLD AUTO: 2.37 M/UL (ref 4–5.4)
SODIUM SERPL-SCNC: 131 MMOL/L (ref 136–145)
WBC # BLD AUTO: 3.38 K/UL (ref 3.9–12.7)
WBC # BLD AUTO: 4.12 K/UL (ref 3.9–12.7)
WBC #/AREA STL HPF: NORMAL /[HPF]

## 2022-12-26 PROCEDURE — 63600175 PHARM REV CODE 636 W HCPCS: Performed by: INTERNAL MEDICINE

## 2022-12-26 PROCEDURE — 27000221 HC OXYGEN, UP TO 24 HOURS

## 2022-12-26 PROCEDURE — 97162 PT EVAL MOD COMPLEX 30 MIN: CPT

## 2022-12-26 PROCEDURE — 80053 COMPREHEN METABOLIC PANEL: CPT | Performed by: INTERNAL MEDICINE

## 2022-12-26 PROCEDURE — 25000242 PHARM REV CODE 250 ALT 637 W/ HCPCS: Performed by: INTERNAL MEDICINE

## 2022-12-26 PROCEDURE — 25000003 PHARM REV CODE 250: Performed by: INTERNAL MEDICINE

## 2022-12-26 PROCEDURE — 63600175 PHARM REV CODE 636 W HCPCS: Performed by: SURGERY

## 2022-12-26 PROCEDURE — 11000001 HC ACUTE MED/SURG PRIVATE ROOM

## 2022-12-26 PROCEDURE — 99233 PR SUBSEQUENT HOSPITAL CARE,LEVL III: ICD-10-PCS | Mod: ,,, | Performed by: INTERNAL MEDICINE

## 2022-12-26 PROCEDURE — 99233 SBSQ HOSP IP/OBS HIGH 50: CPT | Mod: ,,, | Performed by: INTERNAL MEDICINE

## 2022-12-26 PROCEDURE — 82330 ASSAY OF CALCIUM: CPT | Performed by: INTERNAL MEDICINE

## 2022-12-26 PROCEDURE — A4216 STERILE WATER/SALINE, 10 ML: HCPCS | Performed by: INTERNAL MEDICINE

## 2022-12-26 PROCEDURE — 84100 ASSAY OF PHOSPHORUS: CPT | Performed by: INTERNAL MEDICINE

## 2022-12-26 PROCEDURE — 97166 OT EVAL MOD COMPLEX 45 MIN: CPT

## 2022-12-26 PROCEDURE — 99900035 HC TECH TIME PER 15 MIN (STAT)

## 2022-12-26 PROCEDURE — 83735 ASSAY OF MAGNESIUM: CPT | Performed by: INTERNAL MEDICINE

## 2022-12-26 PROCEDURE — 94761 N-INVAS EAR/PLS OXIMETRY MLT: CPT

## 2022-12-26 PROCEDURE — 97535 SELF CARE MNGMENT TRAINING: CPT

## 2022-12-26 PROCEDURE — 85027 COMPLETE CBC AUTOMATED: CPT | Performed by: INTERNAL MEDICINE

## 2022-12-26 PROCEDURE — 94640 AIRWAY INHALATION TREATMENT: CPT

## 2022-12-26 PROCEDURE — 85025 COMPLETE CBC W/AUTO DIFF WBC: CPT | Performed by: INTERNAL MEDICINE

## 2022-12-26 RX ORDER — METRONIDAZOLE 250 MG/1
500 TABLET ORAL EVERY 8 HOURS
Status: DISCONTINUED | OUTPATIENT
Start: 2022-12-26 | End: 2022-12-27 | Stop reason: HOSPADM

## 2022-12-26 RX ORDER — CIPROFLOXACIN 500 MG/1
500 TABLET ORAL EVERY 12 HOURS
Status: DISCONTINUED | OUTPATIENT
Start: 2022-12-27 | End: 2022-12-27 | Stop reason: HOSPADM

## 2022-12-26 RX ADMIN — ACETAMINOPHEN 650 MG: 325 TABLET, FILM COATED ORAL at 08:12

## 2022-12-26 RX ADMIN — TRAZODONE HYDROCHLORIDE 100 MG: 50 TABLET ORAL at 08:12

## 2022-12-26 RX ADMIN — METRONIDAZOLE 500 MG: 250 TABLET ORAL at 10:12

## 2022-12-26 RX ADMIN — MUPIROCIN: 20 OINTMENT TOPICAL at 08:12

## 2022-12-26 RX ADMIN — AZELASTINE HYDROCHLORIDE 137 MCG: 137 SPRAY, METERED NASAL at 09:12

## 2022-12-26 RX ADMIN — DULOXETINE 60 MG: 30 CAPSULE, DELAYED RELEASE ORAL at 09:12

## 2022-12-26 RX ADMIN — SODIUM CHLORIDE, PRESERVATIVE FREE 10 ML: 5 INJECTION INTRAVENOUS at 12:12

## 2022-12-26 RX ADMIN — LEVALBUTEROL HYDROCHLORIDE 0.63 MG: 0.63 SOLUTION RESPIRATORY (INHALATION) at 11:12

## 2022-12-26 RX ADMIN — AZELASTINE HYDROCHLORIDE 137 MCG: 137 SPRAY, METERED NASAL at 08:12

## 2022-12-26 RX ADMIN — SODIUM CHLORIDE, PRESERVATIVE FREE 10 ML: 5 INJECTION INTRAVENOUS at 02:12

## 2022-12-26 RX ADMIN — SODIUM CHLORIDE, PRESERVATIVE FREE 10 ML: 5 INJECTION INTRAVENOUS at 06:12

## 2022-12-26 RX ADMIN — ENOXAPARIN SODIUM 40 MG: 40 INJECTION SUBCUTANEOUS at 05:12

## 2022-12-26 RX ADMIN — HYDROCODONE BITARTRATE AND ACETAMINOPHEN 1 TABLET: 10; 325 TABLET ORAL at 05:12

## 2022-12-26 RX ADMIN — MUPIROCIN: 20 OINTMENT TOPICAL at 09:12

## 2022-12-26 RX ADMIN — PIPERACILLIN AND TAZOBACTAM 4.5 G: 4; .5 INJECTION, POWDER, LYOPHILIZED, FOR SOLUTION INTRAVENOUS; PARENTERAL at 02:12

## 2022-12-26 RX ADMIN — HYDROCODONE BITARTRATE AND ACETAMINOPHEN 1 TABLET: 10; 325 TABLET ORAL at 09:12

## 2022-12-26 RX ADMIN — GABAPENTIN 200 MG: 100 CAPSULE ORAL at 08:12

## 2022-12-26 RX ADMIN — ATORVASTATIN CALCIUM 40 MG: 20 TABLET, FILM COATED ORAL at 09:12

## 2022-12-26 RX ADMIN — LEVALBUTEROL HYDROCHLORIDE 0.63 MG: 0.63 SOLUTION RESPIRATORY (INHALATION) at 03:12

## 2022-12-26 RX ADMIN — TIOTROPIUM BROMIDE INHALATION SPRAY 2 PUFF: 3.12 SPRAY, METERED RESPIRATORY (INHALATION) at 08:12

## 2022-12-26 RX ADMIN — FLUTICASONE FUROATE AND VILANTEROL TRIFENATATE 1 PUFF: 100; 25 POWDER RESPIRATORY (INHALATION) at 08:12

## 2022-12-26 RX ADMIN — GUAIFENESIN 600 MG: 600 TABLET, EXTENDED RELEASE ORAL at 09:12

## 2022-12-26 RX ADMIN — PANTOPRAZOLE SODIUM 40 MG: 40 TABLET, DELAYED RELEASE ORAL at 09:12

## 2022-12-26 RX ADMIN — LEVALBUTEROL HYDROCHLORIDE 0.63 MG: 0.63 SOLUTION RESPIRATORY (INHALATION) at 08:12

## 2022-12-26 RX ADMIN — PIPERACILLIN AND TAZOBACTAM 4.5 G: 4; .5 INJECTION, POWDER, LYOPHILIZED, FOR SOLUTION INTRAVENOUS; PARENTERAL at 11:12

## 2022-12-26 RX ADMIN — GUAIFENESIN 600 MG: 600 TABLET, EXTENDED RELEASE ORAL at 08:12

## 2022-12-26 RX ADMIN — GABAPENTIN 200 MG: 100 CAPSULE ORAL at 04:12

## 2022-12-26 RX ADMIN — PIPERACILLIN AND TAZOBACTAM 4.5 G: 4; .5 INJECTION, POWDER, LYOPHILIZED, FOR SOLUTION INTRAVENOUS; PARENTERAL at 07:12

## 2022-12-26 RX ADMIN — GABAPENTIN 200 MG: 100 CAPSULE ORAL at 09:12

## 2022-12-26 NOTE — PT/OT/SLP EVAL
Occupational Therapy   Evaluation and Treatment    Name: Nalini Varma  MRN: [id number]  Admitting Diagnosis: Shock, unspecified  Recent Surgery: * No surgery found *      Recommendations:     Discharge Recommendations: home health OT, home health PT  Discharge Equipment Recommendations:  none  Barriers to discharge:   (Pt will have limited assistance from her friend.)    Assessment:     Nalini Varma is a 72 y.o. female with a medical diagnosis of Shock, unspecified.  She presents alert, oriented and agreeable to OT evaluation and tx session.  Performance deficits affecting function: weakness, impaired endurance, impaired self care skills, impaired functional mobility, decreased lower extremity function, pain, decreased ROM, decreased upper extremity function, impaired cardiopulmonary response to activity.      Rehab Prognosis:  Good to return to OF ; patient would benefit from acute skilled OT services to address these deficits and reach maximum level of function.       Plan:     Patient to be seen 4 x/week to address the above listed problems via self-care/home management, therapeutic activities  Plan of Care Expires: 01/25/23  Plan of Care Reviewed with: patient    Subjective     Chief Complaint: None  Patient/Family Comments/goals: To return home and continue with cancer treatment    Occupational Profile:  Living Environment: Lives on 3rd floor apartment with elevator access, tub/shower  Previous level of function: Mod I from wheelchair level, performs all IADL for herself from wheelchair level, sometimes friend will take out her trash but pt also able to do this from wheelchair level, uses incontinence briefs  Roles and Routines: Takes taxi for transportation  Equipment Used at Home: bath bench, oxygen, wheelchair, rollator  Assistance upon Discharge: Pt lives alone and her friend usually comes to visit about 3 times a week    Pain/Comfort:  Pain Rating 1:  (Left knee pain with movement)    Patients  cultural, spiritual, Taoist conflicts given the current situation: no    Objective:     Communicated with: nurse prior to session.  Patient found HOB elevated with peripheral IV, oxygen, PureWick upon OT entry to room.    General Precautions: Standard, hearing impaired  Orthopedic Precautions: N/A  Braces: N/A  Respiratory Status: Nasal cannula, flow 2 L/min    Occupational Performance:    Bed Mobility:    Supine to sit: SBA with use of bed rail; pt doesn't have bed rail at home    Functional Mobility/Transfers:  Stand pivot bed to wheelchair: SBA with use of wheelchair arm rest and bed rail (pt doesn't have bed rails at home)  Functional Mobility: No LOB    Activities of Daily Living:  Feeding: Indep  LB dressing: Set up to doff/don socks seated in wheelchair, crosses foot over opposite knee  Toileting: Pt is using female external catheter; Pt reporting she has to perform all hygiene sitting on toilet, clothing management needs to be evaluated     Cognitive/Visual Perceptual:  Cognitive/Psychosocial Skills:     -       Oriented to: Person, Place, Time, and Situation   -       Follows Commands/attention:Follows multistep  commands  -       Communication: clear/fluent  -       Memory: No Deficits noted  -       Safety awareness/insight to disability: Pt wanting to get back to bed from wheelchair on her own later today,  Pt educated on need to call for assistance due to multiple lines to manage (catheter line, IV and O2 tubing)   -       Mood/Affect/Coping skills/emotional control: Cooperative and Pleasant    Physical Exam:  Forward head, rounded shoulders  Sitting Balance: Good  Static Standing: Fair-, needing bilateral UE support for pivot transfer  Left UE AROM: Shoulder 0-80 degress, Elbow to hand: WFL for self care  Right UE AROM: WFL for self care tasks  Edema: none noted  Skin: Visible skin intact    AMPAC 6 Click ADL:  AMPAC Total Score: 19    Treatment & Education:  Role of OT, POC, safety with ADL and ADL  mobility    Patient left  seated in pt's personal wheelchair  with all lines intact, call button in reach, and nurse notified    GOALS:   Multidisciplinary Problems       Occupational Therapy Goals          Problem: Occupational Therapy    Goal Priority Disciplines Outcome Interventions   Occupational Therapy Goal     OT, PT/OT Ongoing, Progressing    Description: Goals to be met by: 1/7/23     Patient will increase functional independence with ADLs by performing:    Wheelchair <> toilet transfers at Mod I level.  Toileting at Mod I level.  LB dressing at Mod I level.  Grooming at sink at Indep level from wheelchair  Supine <> sit at Mod I level.                         History:     Past Medical History:   Diagnosis Date    Anxiety     Cervical spinal stenosis     Choledocholithiasis     Chronic obstructive pulmonary disease 03/16/2016    Degenerative disc disease of cervical spine     Diverticulosis 04/24/2018    History of alcohol abuse 03/02/2021    History of gastric ulcer     History of L3 compression fracture 12/19/2018    History of lung cancer     s/p completion of XRT in 10/2020    Hyperlipidemia     Iron deficiency anemia     Osteoarthritis     Stage III CKD 2017         Past Surgical History:   Procedure Laterality Date    BREAST CYST EXCISION Right     1967    CATARACT EXTRACTION      COLONOSCOPY  2015    COLONOSCOPY N/A 6/8/2022    Procedure: COLONOSCOPY;  Surgeon: Helio Cheema MD;  Location: [facility] ENDO [address]);  Service: Endoscopy;  Laterality: N/A;  5/25-Pt requesting Dr. Cheema-approval given per Dr. Cheema-ml  Fully vaccinated, prep instr portal -ml    CORONARY ANGIOGRAPHY N/A 7/20/2018    Procedure: ANGIOGRAM, CORONARY ARTERY;  Surgeon: Willard Roberts MD;  Location: [facility] CATH LAB;  Service: Cardiovascular;  Laterality: N/A;    ENDOSCOPIC ULTRASOUND OF UPPER GASTROINTESTINAL TRACT N/A 3/24/2021    Procedure: ULTRASOUND, UPPER GI TRACT, ENDOSCOPIC;  Surgeon: Helio Cheema MD;   Location: [facility] ENDO (2ND FLR);  Service: Endoscopy;  Laterality: N/A;    ENDOSCOPIC ULTRASOUND OF UPPER GASTROINTESTINAL TRACT N/A 4/25/2022    Procedure: ULTRASOUND, UPPER GI TRACT, ENDOSCOPIC;  Surgeon: Helio Cheema MD;  Location: [facility] ENDO (2ND FLR);  Service: Endoscopy;  Laterality: N/A;  cardiac risk assessment 1 per Dr. Ortez. see t/e 3/17-SC  3/25:new instructions via portal. home with medical transport?-SC    ERCP N/A 3/24/2021    Procedure: ERCP (ENDOSCOPIC RETROGRADE CHOLANGIOPANCREATOGRAPHY);  Surgeon: Helio Cheema MD;  Location: [facility] ENDO (2ND FLR);  Service: Endoscopy;  Laterality: N/A;    ERCP N/A 4/30/2021    Procedure: ERCP (ENDOSCOPIC RETROGRADE CHOLANGIOPANCREATOGRAPHY);  Surgeon: Boo Gray MD;  Location: [facility] ENDO (2ND FLR);  Service: Endoscopy;  Laterality: N/A;    ERCP N/A 7/1/2021    Procedure: ERCP (ENDOSCOPIC RETROGRADE CHOLANGIOPANCREATOGRAPHY);  Surgeon: Helio Cheema MD;  Location: [facility] ENDO (2ND FLR);  Service: Endoscopy;  Laterality: N/A;  Ok for Taxi. Dr Cheema  rapid covid 1130am- tb inst email    ESOPHAGOGASTRODUODENOSCOPY  2015    ESOPHAGOGASTRODUODENOSCOPY N/A 3/4/2021    Procedure: EGD (ESOPHAGOGASTRODUODENOSCOPY);  Surgeon: Yenifer Ramirez MD;  Location: [facility];  Service: Endoscopy;  Laterality: N/A;    ESOPHAGOGASTRODUODENOSCOPY N/A 3/24/2021    Procedure: EGD (ESOPHAGOGASTRODUODENOSCOPY);  Surgeon: Helio Cheema MD;  Location: [facility] ENDO (2ND FLR);  Service: Endoscopy;  Laterality: N/A;    EYE SURGERY  2016    Cataracts    FRACTURE SURGERY  2014    JOINT REPLACEMENT  2007    ORIF FEMUR FRACTURE Left     TOTAL KNEE ARTHROPLASTY Left 2007    TUBAL LIGATION         Time Tracking:     OT Date of Treatment: 12/26/22  OT Start Time: 1417  OT Stop Time: 1437  OT Total Time (min): 20 min    Billable Minutes:Evaluation 12  Self Care/Home Management 8    12/26/2022

## 2022-12-26 NOTE — PLAN OF CARE
Problem: Occupational Therapy  Goal: Occupational Therapy Goal  Description: Goals to be met by: 1/7/23     Patient will increase functional independence with ADLs by performing:    Wheelchair <> toilet transfers at Mod I level.  Toileting at Mod I level.  LB dressing at Mod I level.  Grooming at sink at Indep level from wheelchair.    Outcome: Ongoing, Progressing   Evaluation complete and goals established.  Anticipate pt to discharge to home with Home Health and assist from friend as he is able to go to pt's apartment.

## 2022-12-26 NOTE — PT/OT/SLP PROGRESS
Physical Therapy      Patient Name:  Nalini Varma   MRN:  [id number]    Patient not seen today secondary to Other (Comment) (Low H & HC awaiting new lab results). Will follow-up later today.

## 2022-12-26 NOTE — PROGRESS NOTES
[facility] Surg ([address] Medicine  Progress Note    Patient Name: Nalini Varma  MRN: [id number]  Patient Class: IP- Inpatient   Admission Date: 12/23/2022  Length of Stay: 3 days  Attending Physician: AMBER Austin MD  Primary Care Provider: Yane Sahni MD        Subjective:     Principal Problem:Shock, unspecified        HPI:  Ms. Varma is a 72/F with PMH COPD, chronic hypoxemic respiratory failure (2L O2 via NC), HFrEF (ECHO 05/2021 EF 38%), HTN, HLD, anxiety, CKDIII, recurrent adenocarcinoma of left lung on chemo/XRT, SIADH, anemia who presented to [facility] 12/23 from Oncology Clinic with hypotension.  She reports she has been feeling slightly weaker than usual and has noticed increased loose stool over the past week with brown/black coloration darker than usual.  She notes at baseline she has some loose stool but reports has increased to 2-3x/daily and has mild abdominal tenderness. Denies fevers, chills, CP, palpitations; does note chronic SOB but no significant increase and reports using her oxygen at 2-3L at home.  Upon presentation to Oncology Clinic she was noted to have significantly low blood pressure with BP 68/39 initially.  On repeat evaluation he remained low and she was sent to ED for further evaluation.  ED workup was notable for mild hyponatremia, hypomagnesemia, JONATHAN with creatinine 2.2 (baseline 1.2), and decrease in Hgb to 7.6 (baseline high 8s-9s).  CXR showed L-sided pleural effusion; CT Abd/Pelvis showed air/fluid collection in pelvis unclear if representing perisigmoid abscess vs large diverticulum. She received IV hydration with 600mL NS in total without significant improvement in pressures. She was started on norepinephrine gtt and received piperacillin-tazobactam. Surgery was consulted and hospital medicine contacted for admission.      Overview/Hospital Course:  No notes on file    Interval History: No acute events overnight. BPs stable and doing well.  Hgb low this AM but no evidence of bleed - suspect lab draw issue.    Review of Systems   Constitutional:  Negative for chills and fever.   Respiratory:  Negative for cough and shortness of breath.    Cardiovascular:  Negative for chest pain and palpitations.   Gastrointestinal:  Negative for abdominal pain, nausea and vomiting.   Objective:     Vital Signs (Most Recent):  Temp: 98.5 °F (36.9 °C) (12/26/22 1912)  Pulse: 96 (12/26/22 1912)  Resp: 18 (12/26/22 1912)  BP: 138/73 (12/26/22 1912)  SpO2: 99 % (12/26/22 1912)   Vital Signs (24h Range):  Temp:  [97 °F (36.1 °C)-98.5 °F (36.9 °C)] 98.5 °F (36.9 °C)  Pulse:  [] 96  Resp:  [16-22] 18  SpO2:  [87 %-100 %] 99 %  BP: (114-177)/(55-85) 138/73     Weight: 102.1 kg (225 lb 1.4 oz)  Body mass index is 36.33 kg/m².    Intake/Output Summary (Last 24 hours) at 12/26/2022 2046  Last data filed at 12/26/2022 0800  Gross per 24 hour   Intake 320 ml   Output --   Net 320 ml        Physical Exam  Vitals and nursing note reviewed.   Constitutional:       General: She is not in acute distress.     Appearance: She is well-developed.   HENT:      Head: Normocephalic and atraumatic.   Eyes:      General:         Right eye: No discharge.         Left eye: No discharge.      Conjunctiva/sclera: Conjunctivae normal.   Cardiovascular:      Rate and Rhythm: Normal rate.      Pulses: Normal pulses.   Pulmonary:      Effort: Pulmonary effort is normal. No respiratory distress.      Breath sounds: Wheezing present.   Abdominal:      Palpations: Abdomen is soft.      Tenderness: There is no abdominal tenderness.   Musculoskeletal:         General: Normal range of motion.      Right lower leg: No edema.      Left lower leg: No edema.   Skin:     General: Skin is warm and dry.   Neurological:      Mental Status: She is alert and oriented to person, place, and time.       Significant Labs:   CBC:  Recent Labs   Lab 12/24/22  0321 12/25/22  0214 12/26/22  0626 12/26/22  1113   WBC 5.41  4.88 3.38* 4.12   HGB 7.6* 8.0* 6.8* 7.5*   HCT 22.5* 23.6* 20.0* 22.7*   * 120* 132* 134*   GRAN 67.2  3.6 61.3  3.0 58.2  2.0  --    LYMPH 8.7*  0.5* 15.4*  0.8* 16.9*  0.6*  --    MONO 23.1*  1.3* 22.3*  1.1* 23.4*  0.8  --    EOS 0.0 0.0 0.0  --    BASO 0.01 0.01 0.01  --      CMP:  Recent Labs   Lab 12/24/22  0321 12/25/22  0214 12/25/22  1325 12/26/22  0626   * 128* 128* 131*   K 3.4* 3.9 3.9 4.3    99 100 104   CO2 21* 21* 21* 22*   BUN 37* 29* 24* 22   CREATININE 1.6* 1.2 1.0 1.0   GLU 96 94 108 83   CALCIUM 6.5* 6.2* 6.4* 7.3*   MG 1.6 1.3* 1.6 1.6   PHOS 3.3 2.0* 3.8 3.5   ALKPHOS 92 89  --  86   AST 24 17  --  14   ALT 21 21  --  15   BILITOT 0.8 0.3  --  0.3   PROT 4.7* 4.7*  --  4.7*   ALBUMIN 1.9* 1.8*  --  1.8*   ANIONGAP 7* 8 7* 5*       Recent Labs   Lab 12/23/22  1015 12/24/22 2001   * 550*       Significant Imaging:   No new imaging this morning.      Assessment/Plan:      * Shock, unspecified  - Shock presumed septic in setting of ?intra-abdominal fluid collection / loose stool / hypotension not responsive to fluids alone.  - Continue piperacillin-tazobactam 4.5g IV q8hr for potential intra-abdominal source. Surgery consulted in ED; suspect this represents a large diverticula rather than abscess. If fails to improve will consider potential drainage but patient wishes to avoid surgical intervention and this seems very reasonable.  - Plan to de-escalate from pip-tazo if continues to improve.  - Stool studies in process. C difficile canceled after no BM in 24hr.  - Echo 12/24 shows EF 55%, grade II diastolic dysfunction. BNP elevated but clinically does not appear overloaded.  - Weaned from norepinephrine gtt 12/25.  - Appreciate pulm/crit assistance.    Anemia of chronic disease  - Anemia in setting of malignancy / chemo / XRT. Patient reports darker stool, though was occult negative from stool produced in ED.  - Transfused 1U pRBCs 12/23; not significantly  changed, but no evidence of acute bleeding or symptoms of anemia at this time.  - Repeat Hgb this AM given low on AM labs; suspect lab draw error.    JONATHAN (acute kidney injury)  CKDIII  - JONATHAN likely secondary to GI losses / dehydration.  - Improved with IVFs; monitor with PO intake.    Recurrent adenocarcinoma of left lung  - Lung adenocarcinoma with recurrence undergoing current XRT/chemo with Dr. Gilmore.  - Palliative consulted, appreciate assistance.    Syndrome of inappropriate secretion of antidiuretic hormone  - Mild hyponatremia with h/o SIADH; monitor.    Chronic combined systolic and diastolic CHF (congestive heart failure)  - Last echo 05/2021 showed EF 38%.  - Appears volume depleted; monitor closely in setting of shock / fluid resuscitation.  - Hold diuretics in setting of shock / JONATHAN.    Generalized anxiety disorder  - Continue alprazolam 0.25mg PO daily PRN.    Hyperlipidemia  - Continue atorvastatin 40mg PO daily.    Essential hypertension  - Hold antihypertensives in setting of shock.    Chronic obstructive pulmonary disease  Chronic hypoxemic resp failure  - Continue fluticasone-vilanterol 100-25mcg inhaled daily, tiotropium 5mcg inhaled daily, albuterol-ipratropium 2.5-0.5mg inhaled q4hr PRN.  - Continue supplemental O2; uses 2L at baseline.    VTE Risk Mitigation (From admission, onward)         Ordered     enoxaparin injection 40 mg  Daily         12/24/22 1256     Reason for No Pharmacological VTE Prophylaxis  Once        Question:  Reasons:  Answer:  Risk of Bleeding    12/23/22 1609     IP VTE HIGH RISK PATIENT  Once         12/23/22 1609     Place sequential compression device  Until discontinued         12/23/22 1609                Discharge Planning   FELICIANO:      Code Status: Full Code   Is the patient medically ready for discharge?:     Reason for patient still in hospital (select all that apply): Treatment  Discharge Plan A: Home                  D Cornelio Austin MD  Department of Hospital  [facility] - Med Surg ([address])

## 2022-12-26 NOTE — PT/OT/SLP EVAL
Physical Therapy Evaluation and Discharge Note    Patient Name:  Nalini Varma   MRN:  [id number]    Recommendations:     Discharge Recommendations: home  Discharge Equipment Recommendations: none   Barriers to discharge:  Increased mob and transfer needs    Assessment:     Nalini Varma is a 72 y.o. female admitted with a medical diagnosis of Shock, unspecified.  At this time, patient is functioning at their prior level of function and does not require further acute PT services. Upon arrival, pt was elevated HOB w/ OT on 2L/min O2. Pt was Mod I sup<>stand. Pt was Mod I w/ sit<>stand w/ HR. Pt performed stand pivot Indep from bed to wc. Pt has L side weakness. Fair- Standing tolerance w/ HR increased lumbar flexion <3mins. TA w/ self care needs w/in standing position. Pt is able to manage wc w/ BUE and BLE indep. DCPT. Pt at her functional baseline for PT.    Recent Surgery: * No surgery found *      Plan:     During this hospitalization, patient does not require further acute PT services.  Please re-consult if situation changes.      Subjective     Chief Complaint: Pain in L knee w/ mvmt  Patient/Family Comments/goals: None stated  Pain/Comfort:  Pain Rating 1: other (see comments) (pain in L knee only w/ mvmt)  Location - Orientation 1: generalized  Location 1: knee  Pain Addressed 1: Reposition, Distraction    Patients cultural, spiritual, Voodoo conflicts given the current situation: no    Living Environment:  Pt lives in an Apt on the 3rd floor, utilizes elevator for entry and taxi for transportation.  Prior to admission, patients level of function was Mod I.  Equipment used at home: wheelchair, bath bench, oxygen, rollator.  DME owned (not currently used): none.  Upon discharge, patient will have assistance from Friend who comes 3x/wk and neighbors.    Objective:     Communicated with Pattie BARRON prior to session.  Patient found sitting edge of bed with peripheral IV, PureWick, oxygen upon PT entry to  room.    General Precautions: Standard, hearing impaired    Orthopedic Precautions:N/A   Braces: N/A  Respiratory Status: Nasal cannula, flow 2 L/min    Exams:  Cognitive Exam:  Patient is oriented to Person, Place, Time, and Situation  Gross Motor Coordination:  WFL  RLE ROM: WFL  RLE Strength: WFL  LLE ROM: Deficits: Decreased Hip and knee AROM  LLE Strength: Deficits: 2-/5 hip flex, and 3+/5 knee flex/ext    Functional Mobility:  Bed Mobility:     Supine to Sit: modified independence  Sit to Supine: modified independence  Transfers:     Sit to Stand:  modified independence with Bed HR  Bed to Chair: modified independence with  bed HR  using  Stand Pivot  Balance: Fair- static    AM-PAC 6 CLICK MOBILITY  Total Score:16       Treatment and Education:  Transfers, safety awareness, postural awareness, Eval/DPCT    AM-PAC 6 CLICK MOBILITY  Total Score:16     Patient left up in chair with all lines intact, call button in reach, and RN notified.    GOALS:   Multidisciplinary Problems       Physical Therapy Goals       Not on file                    History:     Past Medical History:   Diagnosis Date    Anxiety     Cervical spinal stenosis     Choledocholithiasis     Chronic obstructive pulmonary disease 03/16/2016    Degenerative disc disease of cervical spine     Diverticulosis 04/24/2018    History of alcohol abuse 03/02/2021    History of gastric ulcer     History of L3 compression fracture 12/19/2018    History of lung cancer     s/p completion of XRT in 10/2020    Hyperlipidemia     Iron deficiency anemia     Osteoarthritis     Stage III CKD 2017       Past Surgical History:   Procedure Laterality Date    BREAST CYST EXCISION Right     1967    CATARACT EXTRACTION      COLONOSCOPY  2015    COLONOSCOPY N/A 6/8/2022    Procedure: COLONOSCOPY;  Surgeon: Helio Cheema MD;  Location: [address]);  Service: Endoscopy;  Laterality: N/A;  5/25-Pt requesting Dr. Cheema-approval given per Dr. Cheema-  Fully  vaccinated, prep instr portal -ml    CORONARY ANGIOGRAPHY N/A 7/20/2018    Procedure: ANGIOGRAM, CORONARY ARTERY;  Surgeon: Willard Roberts MD;  Location: [facility] CATH LAB;  Service: Cardiovascular;  Laterality: N/A;    ENDOSCOPIC ULTRASOUND OF UPPER GASTROINTESTINAL TRACT N/A 3/24/2021    Procedure: ULTRASOUND, UPPER GI TRACT, ENDOSCOPIC;  Surgeon: Helio Cheema MD;  Location: [facility] ENDO (2ND FLR);  Service: Endoscopy;  Laterality: N/A;    ENDOSCOPIC ULTRASOUND OF UPPER GASTROINTESTINAL TRACT N/A 4/25/2022    Procedure: ULTRASOUND, UPPER GI TRACT, ENDOSCOPIC;  Surgeon: Helio Cheema MD;  Location: [facility] ENDO (2ND FLR);  Service: Endoscopy;  Laterality: N/A;  cardiac risk assessment 1 per Dr. Ortez. see t/e 3/17-SC  3/25:new instructions via portal. home with medical transport?-SC    ERCP N/A 3/24/2021    Procedure: ERCP (ENDOSCOPIC RETROGRADE CHOLANGIOPANCREATOGRAPHY);  Surgeon: Helio Cheema MD;  Location: [facility] (2ND FLR);  Service: Endoscopy;  Laterality: N/A;    ERCP N/A 4/30/2021    Procedure: ERCP (ENDOSCOPIC RETROGRADE CHOLANGIOPANCREATOGRAPHY);  Surgeon: Boo Gray MD;  Location: [facility] (2ND FLR);  Service: Endoscopy;  Laterality: N/A;    ERCP N/A 7/1/2021    Procedure: ERCP (ENDOSCOPIC RETROGRADE CHOLANGIOPANCREATOGRAPHY);  Surgeon: Helio Cheema MD;  Location: [facility] ENDO (2ND FLR);  Service: Endoscopy;  Laterality: N/A;  Ok for Taxi. Dr Cheema  rapid covid 1130am- tb inst email    ESOPHAGOGASTRODUODENOSCOPY  2015    ESOPHAGOGASTRODUODENOSCOPY N/A 3/4/2021    Procedure: EGD (ESOPHAGOGASTRODUODENOSCOPY);  Surgeon: Yenifer Ramirez MD;  Location: [facility] ENDO;  Service: Endoscopy;  Laterality: N/A;    ESOPHAGOGASTRODUODENOSCOPY N/A 3/24/2021    Procedure: EGD (ESOPHAGOGASTRODUODENOSCOPY);  Surgeon: Helio Cheema MD;  Location: [facility] (2ND FLR);  Service: Endoscopy;  Laterality: N/A;    EYE SURGERY  2016    Cataracts    FRACTURE SURGERY  2014    JOINT REPLACEMENT  2007     ORIF FEMUR FRACTURE Left     TOTAL KNEE ARTHROPLASTY Left 2007    TUBAL LIGATION         Time Tracking:     PT Received On: 12/26/22  PT Start Time: 1420     PT Stop Time: 1435  PT Total Time (min): 15 min     Billable Minutes: Evaluation 15      12/26/2022

## 2022-12-26 NOTE — PLAN OF CARE
Plan of care reviewed with pt. Pt voiced understanding. NSR on monitor. Pt on 1L NC w/ humidification. No acute distress noted. Pt's pain managed with Norco, full relief obtained.    Report given to ANDERSON Tucker on med surg unit. Pt awaiting to be transported.    Side rails X2, bed in lowest position, call bell within reach, pt advised to call for assistance. Maintain bed alarm for pt safety.

## 2022-12-26 NOTE — PLAN OF CARE
Problem: Adult Inpatient Plan of Care  Goal: Plan of Care Review  Outcome: Ongoing, Progressing  Goal: Optimal Comfort and Wellbeing  Outcome: Ongoing, Progressing  Goal: Readiness for Transition of Care  Outcome: Ongoing, Progressing     Problem: Coping Ineffective  Goal: Effective Coping  Outcome: Ongoing, Progressing     Problem: Adjustment to Illness (Sepsis/Septic Shock)  Goal: Optimal Coping  Outcome: Ongoing, Progressing     Problem: Bleeding (Sepsis/Septic Shock)  Goal: Absence of Bleeding  Outcome: Ongoing, Progressing     Problem: Infection Progression (Sepsis/Septic Shock)  Goal: Absence of Infection Signs and Symptoms  Outcome: Ongoing, Progressing     Problem: Nutrition Impaired (Sepsis/Septic Shock)  Goal: Optimal Nutrition Intake  Outcome: Ongoing, Progressing     Problem: Impaired Wound Healing  Goal: Optimal Wound Healing  Outcome: Ongoing, Progressing     Problem: Fall Injury Risk  Goal: Absence of Fall and Fall-Related Injury  Outcome: Ongoing, Progressing     Problem: Skin Injury Risk Increased  Goal: Skin Health and Integrity  Outcome: Ongoing, Progressing

## 2022-12-26 NOTE — PLAN OF CARE
Problem: Adult Inpatient Plan of Care  Goal: Plan of Care Review  Outcome: Ongoing, Progressing  Goal: Patient-Specific Goal (Individualized)  Outcome: Ongoing, Progressing  Goal: Absence of Hospital-Acquired Illness or Injury  Outcome: Ongoing, Progressing  Goal: Optimal Comfort and Wellbeing  Outcome: Ongoing, Progressing  Goal: Readiness for Transition of Care  Outcome: Ongoing, Progressing     Problem: Coping Ineffective  Goal: Effective Coping  Outcome: Ongoing, Progressing     Problem: Adjustment to Illness (Sepsis/Septic Shock)  Goal: Optimal Coping  Outcome: Ongoing, Progressing     Problem: Bleeding (Sepsis/Septic Shock)  Goal: Absence of Bleeding  Outcome: Ongoing, Progressing     Problem: Glycemic Control Impaired (Sepsis/Septic Shock)  Goal: Blood Glucose Level Within Desired Range  Outcome: Ongoing, Progressing     Problem: Infection Progression (Sepsis/Septic Shock)  Goal: Absence of Infection Signs and Symptoms  Outcome: Ongoing, Progressing     Problem: Nutrition Impaired (Sepsis/Septic Shock)  Goal: Optimal Nutrition Intake  Outcome: Ongoing, Progressing     Problem: Fluid and Electrolyte Imbalance (Acute Kidney Injury/Impairment)  Goal: Fluid and Electrolyte Balance  Outcome: Ongoing, Progressing     Problem: Oral Intake Inadequate (Acute Kidney Injury/Impairment)  Goal: Optimal Nutrition Intake  Outcome: Ongoing, Progressing     Problem: Renal Function Impairment (Acute Kidney Injury/Impairment)  Goal: Effective Renal Function  Outcome: Ongoing, Progressing     Problem: Infection  Goal: Absence of Infection Signs and Symptoms  Outcome: Ongoing, Progressing     Problem: Impaired Wound Healing  Goal: Optimal Wound Healing  Outcome: Ongoing, Progressing     Problem: Fall Injury Risk  Goal: Absence of Fall and Fall-Related Injury  Outcome: Ongoing, Progressing     Problem: Skin Injury Risk Increased  Goal: Skin Health and Integrity  Outcome: Ongoing, Progressing

## 2022-12-27 VITALS
WEIGHT: 225.06 LBS | OXYGEN SATURATION: 97 % | RESPIRATION RATE: 20 BRPM | SYSTOLIC BLOOD PRESSURE: 124 MMHG | TEMPERATURE: 98 F | HEIGHT: 66 IN | HEART RATE: 114 BPM | BODY MASS INDEX: 36.17 KG/M2 | DIASTOLIC BLOOD PRESSURE: 65 MMHG

## 2022-12-27 LAB
ALBUMIN SERPL BCP-MCNC: 2 G/DL (ref 3.5–5.2)
ALP SERPL-CCNC: 91 U/L (ref 55–135)
ALT SERPL W/O P-5'-P-CCNC: 16 U/L (ref 10–44)
ANION GAP SERPL CALC-SCNC: 5 MMOL/L (ref 8–16)
AST SERPL-CCNC: 16 U/L (ref 10–40)
BASOPHILS # BLD AUTO: 0.01 K/UL (ref 0–0.2)
BASOPHILS NFR BLD: 0.4 % (ref 0–1.9)
BILIRUB SERPL-MCNC: 0.2 MG/DL (ref 0.1–1)
BUN SERPL-MCNC: 17 MG/DL (ref 8–23)
CALCIUM SERPL-MCNC: 7.5 MG/DL (ref 8.7–10.5)
CHLORIDE SERPL-SCNC: 105 MMOL/L (ref 95–110)
CO2 SERPL-SCNC: 23 MMOL/L (ref 23–29)
CREAT SERPL-MCNC: 0.9 MG/DL (ref 0.5–1.4)
DIFFERENTIAL METHOD: ABNORMAL
E COLI SXT1 STL QL IA: NEGATIVE
E COLI SXT2 STL QL IA: NEGATIVE
EOSINOPHIL # BLD AUTO: 0 K/UL (ref 0–0.5)
EOSINOPHIL NFR BLD: 1.2 % (ref 0–8)
ERYTHROCYTE [DISTWIDTH] IN BLOOD BY AUTOMATED COUNT: 15 % (ref 11.5–14.5)
EST. GFR  (NO RACE VARIABLE): >60 ML/MIN/1.73 M^2
GLUCOSE SERPL-MCNC: 82 MG/DL (ref 70–110)
HCT VFR BLD AUTO: 23.3 % (ref 37–48.5)
HGB BLD-MCNC: 7.7 G/DL (ref 12–16)
IMM GRANULOCYTES # BLD AUTO: 0.01 K/UL (ref 0–0.04)
IMM GRANULOCYTES NFR BLD AUTO: 0.4 % (ref 0–0.5)
LYMPHOCYTES # BLD AUTO: 0.8 K/UL (ref 1–4.8)
LYMPHOCYTES NFR BLD: 31.6 % (ref 18–48)
MAGNESIUM SERPL-MCNC: 1.4 MG/DL (ref 1.6–2.6)
MCH RBC QN AUTO: 31.7 PG (ref 27–31)
MCHC RBC AUTO-ENTMCNC: 33 G/DL (ref 32–36)
MCV RBC AUTO: 96 FL (ref 82–98)
MONOCYTES # BLD AUTO: 0.6 K/UL (ref 0.3–1)
MONOCYTES NFR BLD: 23.8 % (ref 4–15)
NEUTROPHILS # BLD AUTO: 1.1 K/UL (ref 1.8–7.7)
NEUTROPHILS NFR BLD: 42.6 % (ref 38–73)
NRBC BLD-RTO: 0 /100 WBC
PHOSPHATE SERPL-MCNC: 3.1 MG/DL (ref 2.7–4.5)
PLATELET # BLD AUTO: 134 K/UL (ref 150–450)
PMV BLD AUTO: 9.5 FL (ref 9.2–12.9)
POTASSIUM SERPL-SCNC: 4.7 MMOL/L (ref 3.5–5.1)
PROT SERPL-MCNC: 5 G/DL (ref 6–8.4)
RBC # BLD AUTO: 2.43 M/UL (ref 4–5.4)
RV AG STL QL IA.RAPID: NEGATIVE
SODIUM SERPL-SCNC: 133 MMOL/L (ref 136–145)
WBC # BLD AUTO: 2.56 K/UL (ref 3.9–12.7)

## 2022-12-27 PROCEDURE — 80053 COMPREHEN METABOLIC PANEL: CPT | Performed by: INTERNAL MEDICINE

## 2022-12-27 PROCEDURE — 99239 PR HOSPITAL DISCHARGE DAY,>30 MIN: ICD-10-PCS | Mod: ,,, | Performed by: INTERNAL MEDICINE

## 2022-12-27 PROCEDURE — 63600175 PHARM REV CODE 636 W HCPCS: Performed by: INTERNAL MEDICINE

## 2022-12-27 PROCEDURE — 25000242 PHARM REV CODE 250 ALT 637 W/ HCPCS: Performed by: INTERNAL MEDICINE

## 2022-12-27 PROCEDURE — A4216 STERILE WATER/SALINE, 10 ML: HCPCS | Performed by: INTERNAL MEDICINE

## 2022-12-27 PROCEDURE — 94640 AIRWAY INHALATION TREATMENT: CPT

## 2022-12-27 PROCEDURE — 25000003 PHARM REV CODE 250: Performed by: INTERNAL MEDICINE

## 2022-12-27 PROCEDURE — 83735 ASSAY OF MAGNESIUM: CPT | Performed by: INTERNAL MEDICINE

## 2022-12-27 PROCEDURE — 84100 ASSAY OF PHOSPHORUS: CPT | Performed by: INTERNAL MEDICINE

## 2022-12-27 PROCEDURE — 94761 N-INVAS EAR/PLS OXIMETRY MLT: CPT

## 2022-12-27 PROCEDURE — 99900035 HC TECH TIME PER 15 MIN (STAT)

## 2022-12-27 PROCEDURE — 97535 SELF CARE MNGMENT TRAINING: CPT

## 2022-12-27 PROCEDURE — 27000221 HC OXYGEN, UP TO 24 HOURS

## 2022-12-27 PROCEDURE — 99239 HOSP IP/OBS DSCHRG MGMT >30: CPT | Mod: ,,, | Performed by: INTERNAL MEDICINE

## 2022-12-27 PROCEDURE — 85025 COMPLETE CBC W/AUTO DIFF WBC: CPT | Performed by: INTERNAL MEDICINE

## 2022-12-27 RX ORDER — METRONIDAZOLE 500 MG/1
500 TABLET ORAL EVERY 8 HOURS
Qty: 9 TABLET | Refills: 0 | Status: SHIPPED | OUTPATIENT
Start: 2022-12-27 | End: 2022-12-30

## 2022-12-27 RX ORDER — CIPROFLOXACIN 500 MG/1
500 TABLET ORAL EVERY 12 HOURS
Qty: 6 TABLET | Refills: 0 | Status: SHIPPED | OUTPATIENT
Start: 2022-12-27 | End: 2022-12-30

## 2022-12-27 RX ORDER — MAGNESIUM SULFATE HEPTAHYDRATE 40 MG/ML
2 INJECTION, SOLUTION INTRAVENOUS ONCE
Status: COMPLETED | OUTPATIENT
Start: 2022-12-27 | End: 2022-12-27

## 2022-12-27 RX ADMIN — GUAIFENESIN 600 MG: 600 TABLET, EXTENDED RELEASE ORAL at 09:12

## 2022-12-27 RX ADMIN — GABAPENTIN 200 MG: 100 CAPSULE ORAL at 09:12

## 2022-12-27 RX ADMIN — HYDROCODONE BITARTRATE AND ACETAMINOPHEN 1 TABLET: 10; 325 TABLET ORAL at 05:12

## 2022-12-27 RX ADMIN — LEVALBUTEROL HYDROCHLORIDE 0.63 MG: 0.63 SOLUTION RESPIRATORY (INHALATION) at 08:12

## 2022-12-27 RX ADMIN — AZELASTINE HYDROCHLORIDE 137 MCG: 137 SPRAY, METERED NASAL at 09:12

## 2022-12-27 RX ADMIN — MAGNESIUM SULFATE HEPTAHYDRATE 2 G: 40 INJECTION, SOLUTION INTRAVENOUS at 09:12

## 2022-12-27 RX ADMIN — TIOTROPIUM BROMIDE INHALATION SPRAY 2 PUFF: 3.12 SPRAY, METERED RESPIRATORY (INHALATION) at 08:12

## 2022-12-27 RX ADMIN — PANTOPRAZOLE SODIUM 40 MG: 40 TABLET, DELAYED RELEASE ORAL at 09:12

## 2022-12-27 RX ADMIN — GABAPENTIN 200 MG: 100 CAPSULE ORAL at 02:12

## 2022-12-27 RX ADMIN — CIPROFLOXACIN 500 MG: 500 TABLET, FILM COATED ORAL at 09:12

## 2022-12-27 RX ADMIN — HYDROCODONE BITARTRATE AND ACETAMINOPHEN 1 TABLET: 10; 325 TABLET ORAL at 02:12

## 2022-12-27 RX ADMIN — DULOXETINE 60 MG: 30 CAPSULE, DELAYED RELEASE ORAL at 09:12

## 2022-12-27 RX ADMIN — SODIUM CHLORIDE, PRESERVATIVE FREE 10 ML: 5 INJECTION INTRAVENOUS at 05:12

## 2022-12-27 RX ADMIN — METRONIDAZOLE 500 MG: 250 TABLET ORAL at 02:12

## 2022-12-27 RX ADMIN — METRONIDAZOLE 500 MG: 250 TABLET ORAL at 05:12

## 2022-12-27 RX ADMIN — FLUTICASONE FUROATE AND VILANTEROL TRIFENATATE 1 PUFF: 100; 25 POWDER RESPIRATORY (INHALATION) at 08:12

## 2022-12-27 RX ADMIN — ATORVASTATIN CALCIUM 40 MG: 20 TABLET, FILM COATED ORAL at 09:12

## 2022-12-27 RX ADMIN — LEVALBUTEROL HYDROCHLORIDE 0.63 MG: 0.63 SOLUTION RESPIRATORY (INHALATION) at 02:12

## 2022-12-27 NOTE — ASSESSMENT & PLAN NOTE
- Anemia in setting of malignancy / chemo / XRT. Patient reports darker stool, though was occult negative from stool produced in ED.  - Transfused 1U pRBCs 12/23; not significantly changed, but no evidence of acute bleeding or symptoms of anemia at this time.  - Repeat Hgb this AM given low on AM labs; suspect lab draw error.

## 2022-12-27 NOTE — PLAN OF CARE
Problem: Adult Inpatient Plan of Care  Goal: Plan of Care Review  Outcome: Met  Goal: Patient-Specific Goal (Individualized)  Outcome: Met  Goal: Absence of Hospital-Acquired Illness or Injury  Outcome: Met  Goal: Optimal Comfort and Wellbeing  Outcome: Met  Goal: Readiness for Transition of Care  Outcome: Met     Problem: Coping Ineffective  Goal: Effective Coping  Outcome: Met     Problem: Adjustment to Illness (Sepsis/Septic Shock)  Goal: Optimal Coping  Outcome: Met     Problem: Bleeding (Sepsis/Septic Shock)  Goal: Absence of Bleeding  Outcome: Met     Problem: Glycemic Control Impaired (Sepsis/Septic Shock)  Goal: Blood Glucose Level Within Desired Range  Outcome: Met     Problem: Infection Progression (Sepsis/Septic Shock)  Goal: Absence of Infection Signs and Symptoms  Outcome: Met     Problem: Nutrition Impaired (Sepsis/Septic Shock)  Goal: Optimal Nutrition Intake  Outcome: Met     Problem: Fluid and Electrolyte Imbalance (Acute Kidney Injury/Impairment)  Goal: Fluid and Electrolyte Balance  Outcome: Met     Problem: Oral Intake Inadequate (Acute Kidney Injury/Impairment)  Goal: Optimal Nutrition Intake  Outcome: Met     Problem: Renal Function Impairment (Acute Kidney Injury/Impairment)  Goal: Effective Renal Function  Outcome: Met     Problem: Infection  Goal: Absence of Infection Signs and Symptoms  Outcome: Met     Problem: Impaired Wound Healing  Goal: Optimal Wound Healing  Outcome: Met     Problem: Fall Injury Risk  Goal: Absence of Fall and Fall-Related Injury  Outcome: Met     Problem: Skin Injury Risk Increased  Goal: Skin Health and Integrity  Outcome: Met

## 2022-12-27 NOTE — DISCHARGE SUMMARY
[facility] Surg ([address] Medicine  Discharge Summary      Patient Name: Nalini Vrama  MRN: [id number]  [facility]: [id number]  Patient Class: IP- Inpatient  Admission Date: 12/23/2022  Hospital Length of Stay: 4 days  Discharge Date and Time: 12/27/2022  7:21 PM  Attending Physician: No att. providers found   Discharging Provider: HOMAR Austin MD  Primary Care Provider: Yane Sahni MD    Primary Care Team: Networked reference to record PCT     HPI:   Ms. Varma is a 72/F with PMH COPD, chronic hypoxemic respiratory failure (2L O2 via NC), HFrEF (ECHO 05/2021 EF 38%), HTN, HLD, anxiety, CKDIII, recurrent adenocarcinoma of left lung on chemo/XRT, SIADH, anemia who presented to [facility] 12/23 from Oncology Clinic with hypotension.  She reports she has been feeling slightly weaker than usual and has noticed increased loose stool over the past week with brown/black coloration darker than usual.  She notes at baseline she has some loose stool but reports has increased to 2-3x/daily and has mild abdominal tenderness. Denies fevers, chills, CP, palpitations; does note chronic SOB but no significant increase and reports using her oxygen at 2-3L at home.  Upon presentation to Oncology Clinic she was noted to have significantly low blood pressure with BP 68/39 initially.  On repeat evaluation he remained low and she was sent to ED for further evaluation.  ED workup was notable for mild hyponatremia, hypomagnesemia, JONATHAN with creatinine 2.2 (baseline 1.2), and decrease in Hgb to 7.6 (baseline high 8s-9s).  CXR showed L-sided pleural effusion; CT Abd/Pelvis showed air/fluid collection in pelvis unclear if representing perisigmoid abscess vs large diverticulum. She received IV hydration with 600mL NS in total without significant improvement in pressures. She was started on norepinephrine gtt and received piperacillin-tazobactam. Surgery was consulted and hospital medicine contacted for  admission.      * No surgery found *      Hospital Course:   Admitted with shock suspected 2/2 sepsis. Evaluated for C difficile but negative; treated with empiric piperacillin-tazobactam. Surgery and critical care consulted; recommended conservative management. Received additional IVFs and gradually hypotension resolved. Norepinephrine gtt discontinued and transferred to floor. BPs gradually trended upward and symptoms improved. With clinical improvement and vital stability, she was prepared for discharge home.    Goals of Care Treatment Preferences:  Code Status: Full Code      Consults:   Consults (From admission, onward)          Status Ordering Provider     Inpatient consult to Pulmonary Critical Care  Once        Provider:  Justin Ellerman, MD    Completed AMBER LEON     Inpatient consult to PICC team ([facility])  Once        Provider:  (Not yet assigned)    Completed AMBER LEON     Inpatient consult to General surgery  Once        Provider:  Caitlin Dillon MD    Completed TRIPP VILLANUEVA     Inpatient consult to Palliative Care  Once        Provider:  Beata Parks MD    Completed AMBER LEON            No new Assessment & Plan notes have been filed under this hospital service since the last note was generated.  Service: Hospital Medicine    Final Active Diagnoses:    Diagnosis Date Noted POA    PRINCIPAL PROBLEM:  Shock, unspecified [R57.9] 12/23/2022 Yes    JONATHAN (acute kidney injury) [N17.9] 12/23/2022 Yes    Advance care planning [Z71.89] 12/23/2022 Not Applicable    Anemia of chronic disease [D63.8] 12/23/2022 Yes    Recurrent adenocarcinoma of left lung [C34.92] 10/28/2022 Yes     Chronic    Syndrome of inappropriate secretion of antidiuretic hormone [E22.2] 10/02/2022 Yes     Chronic    Severe obesity [E66.01] 04/29/2022 Yes     Chronic    Chronic hypoxemic respiratory failure [J96.11] 05/13/2021 Yes     Chronic    Chronic combined systolic and diastolic CHF (congestive heart  failure) [I50.42] 09/17/2018 Yes     Chronic    CKD (chronic kidney disease) stage 3, GFR 30-59 ml/min [N18.30] 2017 Yes     Chronic    Generalized anxiety disorder [F41.1] 11/14/2016 Yes     Chronic    Hyperlipidemia [E78.5] 09/20/2016 Yes     Chronic    Essential hypertension [I10] 03/16/2016 Yes     Chronic    Chronic obstructive pulmonary disease [J44.9] 03/16/2016 Yes     Chronic      Problems Resolved During this Admission:       Discharged Condition: good    Disposition: Home or Self Care    Follow Up:   Follow-up Information       Yane Sahni MD Follow up in 2 week(s).    Specialty: Internal Medicine  Why: post-hospital follow-up  Contact information:  [address] [facility] [address]  [phone]               Ann Gilmore MD Follow up in 2 week(s).    Specialty: Hematology and Oncology  Why: post-hospital follow-up  Contact information:  [address]  [address]  [facility] [address]  [phone]                           Patient Instructions:      Diet Cardiac     Notify your health care provider if you experience any of the following:  increased confusion or weakness     Notify your health care provider if you experience any of the following:  difficulty breathing or increased cough     Notify your health care provider if you experience any of the following:  persistent dizziness, light-headedness, or visual disturbances     Notify your health care provider if you experience any of the following:  temperature >100.4     Notify your health care provider if you experience any of the following:  persistent nausea and vomiting or diarrhea     Activity as tolerated       Significant Diagnostic Studies:   CBC:  Recent Labs   Lab 12/26/22  0626 12/26/22  1113 12/27/22  0630 01/05/23  1010   WBC 3.38* 4.12 2.56* 5.09   HGB 6.8* 7.5* 7.7* 8.8*   HCT 20.0* 22.7* 23.3* 26.7*   * 134* 134* 174   GRAN 58.2  2.0  --  42.6  1.1* 62.2  3.2   LYMPH 16.9*  0.6*  --  31.6  0.8* 22.6  1.2    MONO 23.4*  0.8  --  23.8*  0.6 12.8  0.7   EOS 0.0  --  0.0 0.1   BASO 0.01  --  0.01 0.01     BMP:  Recent Labs   Lab 12/25/22  1325 12/26/22  0626 12/27/22  0630 01/05/23  1010   * 131* 133* 132*   K 3.9 4.3 4.7 4.5    104 105 101   CO2 21* 22* 23 23   BUN 24* 22 17 20   CREATININE 1.0 1.0 0.9 1.0    83 82 97   CALCIUM 6.4* 7.3* 7.5* 9.2   MG 1.6 1.6 1.4* 0.8*   PHOS 3.8 3.5 3.1  --      CMP:  Recent Labs   Lab 12/25/22  1325 12/26/22  0626 12/27/22  0630 01/05/23  1010   * 131* 133* 132*   K 3.9 4.3 4.7 4.5    104 105 101   CO2 21* 22* 23 23   BUN 24* 22 17 20   CREATININE 1.0 1.0 0.9 1.0    83 82 97   CALCIUM 6.4* 7.3* 7.5* 9.2   MG 1.6 1.6 1.4* 0.8*   PHOS 3.8 3.5 3.1  --    ALKPHOS  --  86 91 106   AST  --  14 16 13   ALT  --  15 16 13   BILITOT  --  0.3 0.2 0.4   PROT  --  4.7* 5.0* 5.7*   ALBUMIN  --  1.8* 2.0* 2.6*   ANIONGAP 7* 5* 5* 8     Imaging Results               CT Abdomen Pelvis  Without Contrast (Final result)  Result time 12/23/22 13:23:59      Final result by Harry Gamez MD (12/23/22 13:23:59)                   Impression:      Air/fluid collection in the pelvis which is inseparable from the sigmoid colon and adjacent uterus.  Unclear if this finding represents air and fluid within a large diverticulum or true perisigmoid abscess.  Suggest correlation for any symptoms of sigmoid diverticulitis or colitis.  Follow-up CT with IV and/or enteric contrast may provide improved sensitivity when clinically warranted.    Liquid stool in the colon.  Mild rectal wall thickening.    Bilateral perinephric fat stranding and bilateral hyperdense renal lesions, similar to prior studies.    Additional findings discussed in the body of the report.    This report was flagged in Epic as abnormal.      Electronically signed by: Harry Gamez MD  Date:    12/23/2022  Time:    13:23               Narrative:    EXAMINATION:  CT ABDOMEN PELVIS WITHOUT  CONTRAST    CLINICAL HISTORY:  LLQ abdominal pain;    TECHNIQUE:  Low dose axial images, sagittal and coronal reformations were obtained from the lung bases to the pubic symphysis.  Oral contrast was not administered.    COMPARISON:  PET-CT, 07/22/2022.  CT abdomen pelvis, 04/29/2021 and 03/22/2021.    FINDINGS:  Lower chest: Heart size is normal. Linear subsegmental atelectasis or scarring in the left lung base.  No pleural or pericardial effusion.    Abdomen:    Evaluation of the solid abdominal organs and bowel is limited in the absence of IV contrast.    Liver is normal in size and contour without focal contour deforming lesion. Gallbladder is distended but otherwise unremarkable.  No calcified gallstones.  Common bile duct appears mildly distended though similar to priors.  No intra-or extrahepatic biliary ductal dilatation.    Spleen, adrenals, and pancreas are negative for acute finding.    Kidneys are symmetric.  Mild renal atrophy.  Bilateral perinephric fat stranding.  Multiple bilateral hyperdense renal lesions, the largest measuring 1.5 cm in the right kidney (axial image 72).  Largest hyperdense lesion in the left kidney measures approximately 1.4 cm in size (axial image 73).  No hydronephrosis.    No small bowel obstruction.  There is liquid stool throughout the colon.  Hyperdense fluid noted in the cecum and appendix.  Appendix is not distended.  Multiple colonic diverticula most pronounced in the descending and sigmoid colon.  There is a new large air/fluid collection adjacent to the sigmoid colon measuring approximately 6 x 5 cm in size (axial image 133).  Unclear if this finding represents fluid and air within a large diverticulum or true perisigmoid abscess, noting that evaluation of this finding is limited in the absence of IV and enteric contrast.  No gross pneumoperitoneum identified.  No additional organized fluid collection.  No portal venous gas or pneumatosis.    Abdominal aorta is normal  in caliber with severe calcific atherosclerosis.    No abdominal lymphadenopathy.    Pelvis: Urinary bladder is unremarkable.  Uterus is inseparable from a large air/fluid collection in the pelvis as well as the adjacent sigmoid colon.  There is mild rectal wall thickening.  Trace pelvic free fluid.    Bones and soft tissues: No aggressive osseous lesions. Stable significant height loss of L3 vertebral body when compared back to multiple prior studies.  Multilevel degenerative changes in the spine.  Mild sclerosis involving the bilateral femoral heads which may be related to early osteonecrosis.  Mild body wall edema.  Extraperitoneal soft tissues are negative for acute finding.                                       X-Ray Toe 2 or More Views Right (Final result)  Result time 12/23/22 10:43:48      Final result by Maya Presley MD (12/23/22 10:43:48)                   Impression:      Please see above.      Electronically signed by: Maya Presley  Date:    12/23/2022  Time:    10:43               Narrative:    EXAMINATION:  XR TOE 2 OR MORE VIEWS RIGHT    CLINICAL HISTORY:  toe pain;    TECHNIQUE:  Three views of the right toes were performed    COMPARISON:  09/21/2022    FINDINGS:  I see no acute fracture or dislocation.    Degenerative change 1st MTP joint. Second digit crosses over the first.                                       X-Ray Chest AP Portable (Final result)  Result time 12/23/22 10:42:39      Final result by Maya Presley MD (12/23/22 10:42:39)                   Impression:      Small left pleural effusion with adjacent atelectasis or consolidation.      Electronically signed by: Maya Presley  Date:    12/23/2022  Time:    10:42               Narrative:    EXAMINATION:  XR CHEST AP PORTABLE    CLINICAL HISTORY:  Sepsis;    TECHNIQUE:  Single frontal view of the chest was performed.    COMPARISON:  09/14/2022    FINDINGS:  Lungs are well expanded.  Small left pleural effusion with adjacent  atelectasis or consolidation.  Stable opacity right upper lobe compared to recent chest CT.  Otherwise, right lung is fairly clear.    Cardiac silhouette is stable in size.  Calcified plaque lines arch.                                      Pending Diagnostic Studies:       None           Medications:  Reconciled Home Medications:      Medication List        START taking these medications      magnesium oxide 400 mg (241.3 mg magnesium) tablet  Commonly known as: MAG-OX  Take 1 tablet by mouth every 12 (twelve) hours.            CONTINUE taking these medications      ADVAIR -21 mcg/actuation Hfaa inhaler  Generic drug: fluticasone propion-salmeterol 115-21 mcg/dose  Inhale 2 puffs into the lungs every 12 (twelve) hours.     albuterol 90 mcg/actuation inhaler  Commonly known as: VENTOLIN HFA  inhale 1-2 puffs as needed every 6 hours for wheezing and shortness of breath     ascorbic acid (vitamin C) 250 MG tablet  Commonly known as: VITAMIN C  Take 250 mg by mouth once daily.     atorvastatin 40 MG tablet  Commonly known as: LIPITOR  TAKE 1 TABLET BY MOUTH EVERY DAILY     azelastine 137 mcg (0.1 %) nasal spray  Commonly known as: ASTELIN  Instill 1 spray (137 mcg total) by Nasal route 2 (two) times daily.     b complex vitamins capsule  Take 1 capsule by mouth once daily.     biotin 10 mg Tab  Take 10 mg by mouth once daily.     CENTRUM SILVER WOMEN ORAL  Take 1 tablet by mouth once daily.     DULoxetine 60 MG capsule  Commonly known as: CYMBALTA  Take 1 capsule (60 mg total) by mouth once daily.     fluticasone propionate 50 mcg/actuation nasal spray  Commonly known as: FLONASE  1 spray by Each Nare route 2 (two) times daily as needed for Rhinitis.     gabapentin 300 MG capsule  Commonly known as: NEURONTIN  Take 1 capsule (300 mg total) by mouth 3 (three) times daily.     guaiFENesin 600 mg 12 hr tablet  Commonly known as: MUCINEX  Take 1,200 mg by mouth 2 (two) times daily as needed for Congestion.      HYDROcodone-acetaminophen  mg per tablet  Commonly known as: NORCO  Take 1 tablet by mouth every 12 (twelve) hours as needed for Pain.     INCRUSE ELLIPTA 62.5 mcg/actuation inhalation capsule  Generic drug: umeclidinium  Inhale 1 puff into the lungs once daily. Controller     lutein 40 mg Cap  Take by mouth.     metoprolol succinate 25 MG 24 hr tablet  Commonly known as: TOPROL-XL  Take 2 tablets (50 mg total) by mouth once daily.     ondansetron 8 MG Tbdl  Commonly known as: ZOFRAN-ODT  dissolve 1 tablet (8 mg total) by mouth every 8 (eight) hours as needed (nausea).     OYSTER SHELL CALCIUM 500 500 mg calcium (1,250 mg) tablet  Generic drug: calcium carbonate  Take 2 tablets (1,000 mg total) by mouth 2 (two) times daily.     pantoprazole 40 MG tablet  Commonly known as: PROTONIX  Take 1 tablet (40 mg total) by mouth once daily.     PROLIA 60 mg/mL Syrg  Generic drug: denosumab  Inject 1 mL (60 mg total) into the skin every 6 (six) months.     promethazine 25 MG tablet  Commonly known as: PHENERGAN  Take 1 tablet (25 mg total) by mouth every 6 (six) hours as needed for Nausea.     ZINC ORAL  Take by mouth.            ASK your doctor about these medications      ciprofloxacin HCl 500 MG tablet  Commonly known as: CIPRO  Take 1 tablet (500 mg total) by mouth every 12 (twelve) hours. for 3 days  Ask about: Should I take this medication?     cyanocobalamin (vitamin B-12) 1,000 mcg Tbsr  Take 1,000 mcg by mouth once daily.     losartan 100 MG tablet  Commonly known as: COZAAR  Take 1 tablet (100 mg total) by mouth once daily.  Notes to patient: Hold this blood pressure medication if your systolic blood pressure (top number) is below 120.     metroNIDAZOLE 500 MG tablet  Commonly known as: FLAGYL  Take 1 tablet (500 mg total) by mouth every 8 (eight) hours. for 3 days  Ask about: Should I take this medication?     torsemide 20 MG Tab  Commonly known as: DEMADEX  Take 1 tablet (20 mg total) by mouth once  daily.  Notes to patient: Hold this blood pressure medication if your systolic blood pressure (top number) is below 100.     vitamin D 1000 units Tab  Commonly known as: VITAMIN D3  Take 1 tablet (1,000 Units total) by mouth once daily.              Indwelling Lines/Drains at time of discharge:   Lines/Drains/Airways       Peripherally Inserted Central Catheter Line  Duration             PICC Triple Lumen 12/23/22 1708 right basilic 3 days              Drain  Duration             Female External Urinary Catheter 12/23/22 1500 3 days                    Time spent on the discharge of patient: 35 minutes         D Cornelio Austin MD  Department of Hospital Medicine  [facility] ([address])

## 2022-12-27 NOTE — PLAN OF CARE
Follow up appointments scheduled and added to AVS. Patient wears home oxygen and has home portable tank at bedside.    Patient will require transportation home to be arranged with [facility].    12/27/22 1220   Final Note   Assessment Type Final Discharge Note   Anticipated Discharge Disposition Home   Hospital Resources/Appts/Education Provided Provided patient/caregiver with written discharge plan information;Appointments scheduled and added to AVS;Appointments scheduled by Navigator/Coordinator   Post-Acute Status   Discharge Delays None known at this time     [facility] ([address])  Discharge Final Note    Primary Care Provider: Yane Sahni MD    Expected Discharge Date: 12/27/2022    Final Discharge Note (most recent)       Final Note - 12/27/22 1220          Final Note    Assessment Type Final Discharge Note (P)      Anticipated Discharge Disposition Home or Self Care (P)      Hospital Resources/Appts/Education Provided Provided patient/caregiver with written discharge plan information;Appointments scheduled and added to AVS;Appointments scheduled by Navigator/Coordinator (P)         Post-Acute Status    Discharge Delays None known at this time (P)                      Important Message from Medicare             Contact Info       Yane Sahni MD   Specialty: Internal Medicine   Relationship: PCP - General  Hypertension Digital Medicine Responsible Provider  Hyperlipidemia Digital Medicine Responsible Provider    [address] [place] [address] [address]   Phone: [phone]       Next Steps: Follow up in 2 week(s)    Instructions: post-hospital follow-up    Ann Gilmore MD   Specialty: Hematology and Oncology   Relationship: Consulting Physician    [address] [facility]  [address]  [facility] [address]   Phone: [phone]       Next Steps: Follow up in 2 week(s)    Instructions: post-hospital follow-up

## 2022-12-27 NOTE — PT/OT/SLP PROGRESS
Occupational Therapy   Treatment    Name: Nalini Varma  MRN: [id number]  Admitting Diagnosis:  Shock, unspecified       Recommendations:     Discharge Recommendations: home health OT, home health PT  Discharge Equipment Recommendations:  none  Barriers to discharge:   (Pt scheduled to discharge to home today.)    Assessment:     Nalini Varma is a 72 y.o. female with a medical diagnosis of Shock, unspecified.  She presents alert, oriented and excited about her discharge to home today.  Pt reporting, her friend that provides her assistance at times, is aware she is discharging to home today.   Pt having no concerns about functioning in her home setting as she did previously.  Performance deficits affecting function are weakness, impaired endurance, impaired self care skills, impaired functional mobility, decreased coordination, decreased lower extremity function, decreased upper extremity function, gait instability, impaired balance, impaired cardiopulmonary response to activity.     Rehab Prognosis:  Good; patient would benefit from acute skilled OT services to address these deficits and reach maximum level of function.       Plan:     Patient to be seen 4 x/week to address the above listed problems via self-care/home management, therapeutic activities  Plan of Care Expires: 12/27/22 (Pt discharging to home today.)  Plan of Care Reviewed with: patient    Subjective     Pain/Comfort:  Pain Rating 1:  (Pt not reporting pain.)    Objective:     Communicated with: nursePattie prior to session.  Patient found HOB elevated with oxygen, PureWick, peripheral IV upon OT entry to room.  Pt soiled with urine and feces, needing assist for hygiene.     General Precautions: Standard, hearing impaired, fall    Orthopedic Precautions:N/A  Braces: N/A  Respiratory Status: Nasal cannula, flow 2 L/min     Occupational Performance:     Bed Mobility:    Rolling: Mod I   Supine to sit EOB: Mod I      Functional  Mobility/Transfers:  Sit to stand: Mod I using bilateral UE support from bed rail and wheelchair arm rest  Pt stood to pull up her pants completely after donning pull-on pants in bed without LOB - Mod I  Functional Mobility: Bed to wheelchair stand pivot transfer at Mod I level.    Activities of Daily Living:  Feeding: Indep  Toileting: Max  A due to bowel and bladder incontinence in bed, using female external catheter; Mod A to don an adult diaper in preparation for discharge to home after OT session.  Pt normally uses an adult pull up incontinence brief and uses toilet.  Pt reporting she did not realize she had a liquid BM but knew that the female external catheter was not working properly and she was wet with urine.  UB Dressing: Mod I, pt able to retrieve clothing from wheelchair level   LB Dressing: Set up for pants in bed and completed by standing to pull up pants completely, Mod I level donning slip on shoes sitting EOB  Sponge bathing: Set up at bed level after pt assisted with toilet hygiene due to bowel and bladder incontinence.  Pt normally uses adult incontinence briefs and toilet at home.        [facility] 6 Click ADL: 22    Treatment & Education:  Role of OT, POC, safety with ADL and ADL mobility, infection     Patient left  sitting in personal wheelchair maneuvering wheelchair without asssit  with all lines intact, call button in reach, nurse notified, and 2L O2 via NC    GOALS:   Multidisciplinary Problems       Occupational Therapy Goals          Problem: Occupational Therapy    Goal Priority Disciplines Outcome Interventions   Occupational Therapy Goal     OT, PT/OT Ongoing, Progressing    Description: Goals to be met by: 1/7/23     Patient will increase functional independence with ADLs by performing:    Wheelchair <> toilet transfers at Mod I level.  Toileting at Mod I level.  LB dressing at Mod I level.  Grooming at sink at Indep level from wheelchair.                         Time Tracking:     OT  Date of Treatment: 12/27/22  OT Start Time: 1405  OT Stop Time: 1447  OT Total Time (min): 42 min    Billable Minutes:Self Care/Home Management 42    OT/SHUKRI: OT          12/27/2022

## 2022-12-27 NOTE — NURSING
PT assisted patient in bathing and putting on clothes.. IV  and PICC removed by RN.. discharge instructions provided.. patient verbalized understanding ... PT helped patient into her home wheelchair and patient is currently sitting in wheelchair awaiting pickup by wheelchair van service.. pharmacy delivered medications to bedside... will continue to monitor

## 2022-12-27 NOTE — PLAN OF CARE
Followed up with [facility] regarding pending transport home - updated ETA from [facility] 6:30pm - RN aware

## 2022-12-27 NOTE — SUBJECTIVE & OBJECTIVE
Interval History: No acute events overnight. BPs stable and doing well. Hgb low this AM but no evidence of bleed - suspect lab draw issue.    Review of Systems   Constitutional:  Negative for chills and fever.   Respiratory:  Negative for cough and shortness of breath.    Cardiovascular:  Negative for chest pain and palpitations.   Gastrointestinal:  Negative for abdominal pain, nausea and vomiting.   Objective:     Vital Signs (Most Recent):  Temp: 98.5 °F (36.9 °C) (12/26/22 1912)  Pulse: 96 (12/26/22 1912)  Resp: 18 (12/26/22 1912)  BP: 138/73 (12/26/22 1912)  SpO2: 99 % (12/26/22 1912)   Vital Signs (24h Range):  Temp:  [97 °F (36.1 °C)-98.5 °F (36.9 °C)] 98.5 °F (36.9 °C)  Pulse:  [] 96  Resp:  [16-22] 18  SpO2:  [87 %-100 %] 99 %  BP: (114-177)/(55-85) 138/73     Weight: 102.1 kg (225 lb 1.4 oz)  Body mass index is 36.33 kg/m².    Intake/Output Summary (Last 24 hours) at 12/26/2022 2046  Last data filed at 12/26/2022 0800  Gross per 24 hour   Intake 320 ml   Output --   Net 320 ml        Physical Exam  Vitals and nursing note reviewed.   Constitutional:       General: She is not in acute distress.     Appearance: She is well-developed.   HENT:      Head: Normocephalic and atraumatic.   Eyes:      General:         Right eye: No discharge.         Left eye: No discharge.      Conjunctiva/sclera: Conjunctivae normal.   Cardiovascular:      Rate and Rhythm: Normal rate.      Pulses: Normal pulses.   Pulmonary:      Effort: Pulmonary effort is normal. No respiratory distress.      Breath sounds: Wheezing present.   Abdominal:      Palpations: Abdomen is soft.      Tenderness: There is no abdominal tenderness.   Musculoskeletal:         General: Normal range of motion.      Right lower leg: No edema.      Left lower leg: No edema.   Skin:     General: Skin is warm and dry.   Neurological:      Mental Status: She is alert and oriented to person, place, and time.       Significant Labs:   CBC:  Recent Labs   Lab  12/24/22 0321 12/25/22  0214 12/26/22  0626 12/26/22  1113   WBC 5.41 4.88 3.38* 4.12   HGB 7.6* 8.0* 6.8* 7.5*   HCT 22.5* 23.6* 20.0* 22.7*   * 120* 132* 134*   GRAN 67.2  3.6 61.3  3.0 58.2  2.0  --    LYMPH 8.7*  0.5* 15.4*  0.8* 16.9*  0.6*  --    MONO 23.1*  1.3* 22.3*  1.1* 23.4*  0.8  --    EOS 0.0 0.0 0.0  --    BASO 0.01 0.01 0.01  --      CMP:  Recent Labs   Lab 12/24/22 0321 12/25/22 0214 12/25/22  1325 12/26/22  0626   * 128* 128* 131*   K 3.4* 3.9 3.9 4.3    99 100 104   CO2 21* 21* 21* 22*   BUN 37* 29* 24* 22   CREATININE 1.6* 1.2 1.0 1.0   GLU 96 94 108 83   CALCIUM 6.5* 6.2* 6.4* 7.3*   MG 1.6 1.3* 1.6 1.6   PHOS 3.3 2.0* 3.8 3.5   ALKPHOS 92 89  --  86   AST 24 17  --  14   ALT 21 21  --  15   BILITOT 0.8 0.3  --  0.3   PROT 4.7* 4.7*  --  4.7*   ALBUMIN 1.9* 1.8*  --  1.8*   ANIONGAP 7* 8 7* 5*       Recent Labs   Lab 12/23/22  1015 12/24/22 2001   * 550*       Significant Imaging:   No new imaging this morning.

## 2022-12-27 NOTE — ASSESSMENT & PLAN NOTE
- Shock presumed septic in setting of ?intra-abdominal fluid collection / loose stool / hypotension not responsive to fluids alone.  - Continue piperacillin-tazobactam 4.5g IV q8hr for potential intra-abdominal source. Surgery consulted in ED; suspect this represents a large diverticula rather than abscess. If fails to improve will consider potential drainage but patient wishes to avoid surgical intervention and this seems very reasonable.  - Plan to de-escalate from pip-tazo if continues to improve.  - Stool studies in process. C difficile canceled after no BM in 24hr.  - Echo 12/24 shows EF 55%, grade II diastolic dysfunction. BNP elevated but clinically does not appear overloaded.  - Weaned from norepinephrine gtt 12/25.  - Appreciate pulm/crit assistance.

## 2022-12-27 NOTE — NURSING
Attempted to call secure patient delivery (SPD) at [phone] two times. Phone rings and then hangs up.

## 2022-12-27 NOTE — PLAN OF CARE
Problem: Adult Inpatient Plan of Care  Goal: Plan of Care Review  Outcome: Ongoing, Progressing  Goal: Optimal Comfort and Wellbeing  Outcome: Ongoing, Progressing  Goal: Readiness for Transition of Care  Outcome: Ongoing, Progressing     Problem: Coping Ineffective  Goal: Effective Coping  Outcome: Ongoing, Progressing     Problem: Adjustment to Illness (Sepsis/Septic Shock)  Goal: Optimal Coping  Outcome: Ongoing, Progressing     Problem: Fluid and Electrolyte Imbalance (Acute Kidney Injury/Impairment)  Goal: Fluid and Electrolyte Balance  Outcome: Ongoing, Progressing     Problem: Oral Intake Inadequate (Acute Kidney Injury/Impairment)  Goal: Optimal Nutrition Intake  Outcome: Ongoing, Progressing     Problem: Infection  Goal: Absence of Infection Signs and Symptoms  Outcome: Ongoing, Progressing     Problem: Impaired Wound Healing  Goal: Optimal Wound Healing  Outcome: Ongoing, Progressing     Problem: Fall Injury Risk  Goal: Absence of Fall and Fall-Related Injury  Outcome: Ongoing, Progressing     Problem: Skin Injury Risk Increased  Goal: Skin Health and Integrity  Outcome: Ongoing, Progressing

## 2022-12-28 ENCOUNTER — PATIENT MESSAGE (OUTPATIENT)
Dept: INTERNAL MEDICINE | Facility: CLINIC | Age: 72
End: 2022-12-28
Payer: MEDICARE

## 2022-12-28 ENCOUNTER — NURSE TRIAGE (OUTPATIENT)
Dept: ADMINISTRATIVE | Facility: CLINIC | Age: 72
End: 2022-12-28
Payer: MEDICARE

## 2022-12-28 ENCOUNTER — PATIENT OUTREACH (OUTPATIENT)
Dept: ADMINISTRATIVE | Facility: CLINIC | Age: 72
End: 2022-12-28
Payer: MEDICARE

## 2022-12-28 ENCOUNTER — DOCUMENTATION ONLY (OUTPATIENT)
Dept: HEMATOLOGY/ONCOLOGY | Facility: CLINIC | Age: 72
End: 2022-12-28
Payer: MEDICARE

## 2022-12-28 LAB
BACTERIA BLD CULT: NORMAL
BACTERIA BLD CULT: NORMAL

## 2022-12-28 NOTE — TELEPHONE ENCOUNTER
Pt called for post discharge Day #1 and she said that she was blowing her nose and having some blood clots. Pt said that nose isnt actively  bleeding but its when she blows her nose she was recently in the hospital for low blood pressure and she is concerned she said that she does Flonase and Astelin for her nose running. Pt asked if doing any nasal washes or saline and she said no. Pt triaged and care advice is home care. I told pt that I would reach out to MD for any suggestions but told that would consider keeping nose moist and she has Mupiricin ointment to put in there as well Will route message and have provider staff contact pt with any possible advice               Reason for Disposition   Mild-moderate nosebleed and bleeding has stopped now    Additional Information   Negative: Sounds like a life-threatening emergency to the triager   Negative: Fainted (passed out), or too weak to stand following large blood loss   Negative: Bleeding present > 30 minutes and using correct method of direct pressure   Negative: Bleeding now and second call after being instructed in correct technique of direct pressure   Negative: Lightheadedness or dizziness   Negative: Pale skin (pallor) of new-onset or worsening   Negative: Has nasal packing (inserted by health care provider to control bleeding) and now has new rash   Negative: Has nasal packing and now has bleeding around the packing  (Exception: Few drops or ooze.)   Negative: Patient sounds very sick or weak to the triager   Negative: Large amount of blood has been lost (e.g., one cup)   Negative: Bleeding recurs 3 or more times in 24 hours despite direct pressure   Negative: Taking Coumadin (warfarin) or other strong blood thinner, or known bleeding disorder (e.g., thrombocytopenia)   Negative: Has skin bruises or bleeding gums that are not caused by an injury   Negative: Has nasal packing and now has fever > 100.4 F (38.0 C)   Negative: Patient wants to be seen    Negative: Has nasal packing (inserted by health care provider to control bleeding)   Negative: Hard-to-stop nosebleeds are a chronic symptom (recurrent or ongoing AND lasting > 4 weeks)   Negative: Easy bleeding present in other family members    Protocols used: Nosebleed-A-OH

## 2022-12-28 NOTE — TELEPHONE ENCOUNTER
Recommend stop flonase and astelin for 1 month   Ok to use nasal saline to clean out nose periodically and gently blow nose - avoid forceful blowing of nose

## 2022-12-28 NOTE — PROGRESS NOTES
Patient was dc'd from the hospital on 12/27/22. She chose to delay radiation until 12/29. MAURY spoke to patient who confirmed need for rides but was unsure of Friday's appointment times.     MAURY emailed Lucila Vieyra at [facility] requesting transportation for 12/29 at 12:45p and notifying him that a ride would be needed for Friday AM but unsure as to what time.    Patient called back later, she will need 8a pickup to go to [facility]. MAURY provided the number to call when ready for transportation home [phone]. MAURY emailed Lucila with Friday's request. He had already confirmed the ride for 12/29.

## 2022-12-29 ENCOUNTER — PATIENT MESSAGE (OUTPATIENT)
Dept: INTERNAL MEDICINE | Facility: CLINIC | Age: 72
End: 2022-12-29
Payer: MEDICARE

## 2022-12-29 ENCOUNTER — DOCUMENTATION ONLY (OUTPATIENT)
Dept: HEMATOLOGY/ONCOLOGY | Facility: CLINIC | Age: 72
End: 2022-12-29
Payer: MEDICARE

## 2022-12-29 LAB
BACTERIA STL CULT: NORMAL
CALPROTECTIN STL-MCNT: 448.2 MCG/G
O+P STL MICRO: NORMAL

## 2022-12-29 NOTE — PROGRESS NOTES
SW received VM from patient. She is not feeling well and unable to make it to radiation. She will reach out to [employer] and cancel ride for today.     SW also reached out to [employer] and confirmed cancellation and explained that patient is still planning on coming in tomorrow.

## 2022-12-30 ENCOUNTER — DOCUMENTATION ONLY (OUTPATIENT)
Dept: HEMATOLOGY/ONCOLOGY | Facility: CLINIC | Age: 72
End: 2022-12-30
Payer: MEDICARE

## 2022-12-30 NOTE — PROGRESS NOTES
MAURY spoke to patient who did not go to her appointments but did cancel the [employer] when they called to confirm appointments.    Patient will need transportation next week, 12:45p pickup Tues-Thurs and 9:30a on Fri.    MAURY emailed request to Lucila at [employer].

## 2023-01-02 ENCOUNTER — PATIENT MESSAGE (OUTPATIENT)
Dept: HEMATOLOGY/ONCOLOGY | Facility: CLINIC | Age: 73
End: 2023-01-02
Payer: MEDICARE

## 2023-01-03 ENCOUNTER — APPOINTMENT (OUTPATIENT)
Dept: RADIATION THERAPY | Facility: OTHER | Age: 73
End: 2023-01-03
Attending: RADIOLOGY
Payer: MEDICARE

## 2023-01-03 RX ORDER — TRAZODONE HYDROCHLORIDE 100 MG/1
100 TABLET ORAL NIGHTLY
Qty: 30 TABLET | Refills: 2 | Status: SHIPPED | OUTPATIENT
Start: 2023-01-03 | End: 2023-05-10 | Stop reason: SDUPTHER

## 2023-01-04 ENCOUNTER — OFFICE VISIT (OUTPATIENT)
Dept: PSYCHIATRY | Facility: CLINIC | Age: 73
End: 2023-01-04
Payer: MEDICARE

## 2023-01-04 ENCOUNTER — DOCUMENTATION ONLY (OUTPATIENT)
Dept: RADIATION ONCOLOGY | Facility: CLINIC | Age: 73
End: 2023-01-04
Payer: MEDICARE

## 2023-01-04 DIAGNOSIS — F43.10 PTSD (POST-TRAUMATIC STRESS DISORDER): ICD-10-CM

## 2023-01-04 DIAGNOSIS — F33.2 MDD (MAJOR DEPRESSIVE DISORDER), RECURRENT SEVERE, WITHOUT PSYCHOSIS: Primary | ICD-10-CM

## 2023-01-04 PROCEDURE — 1159F PR MEDICATION LIST DOCUMENTED IN MEDICAL RECORD: ICD-10-PCS | Mod: CPTII,95,, | Performed by: PHYSICIAN ASSISTANT

## 2023-01-04 PROCEDURE — 99213 OFFICE O/P EST LOW 20 MIN: CPT | Mod: 95,,, | Performed by: PHYSICIAN ASSISTANT

## 2023-01-04 PROCEDURE — 1111F DSCHRG MED/CURRENT MED MERGE: CPT | Mod: CPTII,95,, | Performed by: PHYSICIAN ASSISTANT

## 2023-01-04 PROCEDURE — 99213 PR OFFICE/OUTPT VISIT, EST, LEVL III, 20-29 MIN: ICD-10-PCS | Mod: 95,,, | Performed by: PHYSICIAN ASSISTANT

## 2023-01-04 PROCEDURE — 1159F MED LIST DOCD IN RCRD: CPT | Mod: CPTII,95,, | Performed by: PHYSICIAN ASSISTANT

## 2023-01-04 PROCEDURE — 77386 HC IMRT, COMPLEX: CPT | Performed by: RADIOLOGY

## 2023-01-04 PROCEDURE — 1111F PR DISCHARGE MEDS RECONCILED W/ CURRENT OUTPATIENT MED LIST: ICD-10-PCS | Mod: CPTII,95,, | Performed by: PHYSICIAN ASSISTANT

## 2023-01-04 PROCEDURE — 77014 HC CT GUIDANCE RADIATION THERAPY FLDS PLACEMENT: CPT | Mod: TC | Performed by: RADIOLOGY

## 2023-01-04 PROCEDURE — 77336 RADIATION PHYSICS CONSULT: CPT | Performed by: RADIOLOGY

## 2023-01-04 PROCEDURE — 77014 PR  CT GUIDANCE PLACEMENT RAD THERAPY FIELDS: CPT | Mod: 26,,, | Performed by: RADIOLOGY

## 2023-01-04 PROCEDURE — 77014 PR  CT GUIDANCE PLACEMENT RAD THERAPY FIELDS: ICD-10-PCS | Mod: 26,,, | Performed by: RADIOLOGY

## 2023-01-04 PROCEDURE — 1160F RVW MEDS BY RX/DR IN RCRD: CPT | Mod: CPTII,95,, | Performed by: PHYSICIAN ASSISTANT

## 2023-01-04 PROCEDURE — 1160F PR REVIEW ALL MEDS BY PRESCRIBER/CLIN PHARMACIST DOCUMENTED: ICD-10-PCS | Mod: CPTII,95,, | Performed by: PHYSICIAN ASSISTANT

## 2023-01-04 RX ORDER — ALPRAZOLAM 0.25 MG/1
0.25 TABLET ORAL DAILY PRN
Qty: 30 TABLET | Refills: 3 | Status: SHIPPED | OUTPATIENT
Start: 2023-01-04 | End: 2023-05-22 | Stop reason: SDUPTHER

## 2023-01-04 NOTE — PROGRESS NOTES
Hematology - oncology PROGRESS NOTE     Subjective:       Patient ID: Nalini Varma is a 72 y.o. female.     Chief Complaint: follow up for chronic anemia and lung cancer     Diagnosis:  1. Anemia of chronic disease  2. Stage I Lung adenocarcinoma of right upper lobe, lepidic pattern, s/p SBRT in Oct/Nov 2021  3. Recurrent Lung cancer 2022     Oncologic History:  1. Ms Varma is a 70 yo woman with chronic pain, anxiety, COPD on 2 liters of home oxygen, CHF with preserved ejection fraction, HTN, CKD stage 3 (creatinine 1.6), who presents for further evaluation of newly diagnosed lung adenocarcinoma. She underwent a screening CT chest on 7/23/20, which showed a 0.6 cm right upper lobe noncalcified pulmonary nodule which measured 0.4 cm on previous CT dated 03/20/2019.  There is a pleural based 1.2 cm pulmonary nodule within the right upper lobe which measured 0.9 cm on previous CT.  There is a 1.2 cm partially spiculated right upper lobe noncalcified pulmonary nodule which measured 0.6 cm on previous CT. She underwent a right lung biopsy on 9/4/2020. Pathology showed adenocarcinoma, well differentiated, lepidic pattern, cannot rule out an invasive component. She presents today for further evaluation. Has been smoking 2PPD for 50 years. Enrolled in smoking cessation program. Now down to 1PPD. Has been on 2L O2 for many years. Needs to use 3 liters when she is wearing a mask. Has CHF. On torsemide. Uses two pillows at night. Unable to walk for a block due to dyspnea. No weight loss. Lives by herself  2. CT head negative for metastases. PET scan 10/7/20: Two right upper lobe nodule with no appreciable activity.  Please note that FDG PET has poor sensitivity for small, well differentiated, and/or indolent malignancies.  An additional subcentimeter node in the apex is too small to characterize by PET for which attention on follow-up is recommended. Mildly enlarged mediastinal lymph nodes, as above, with no  "hypermetabolic activity.  Metastatic involvement is not excluded.  3. Seen by Dr Mead. Underwent SBRT 10/29/21-21       Interval History:   Patient was admitted and managed for severe sepsis. Doing well today. Breathing is stable. resumed radiation.     ECO     ROS:   A ten-point system review is obtained and negative except for what was stated in the Interval History.      Physical Examination:   BP (!) 137/59 (BP Location: Left arm, Patient Position: Sitting, BP Method: Large (Automatic))   Pulse 94   Temp 98.2 °F (36.8 °C) (Oral)   Resp 16   Ht 5' 6" (1.676 m)   SpO2 98%   BMI 36.33 kg/m²     Physical Exam  Vitals and nursing note reviewed.   Constitutional:       General: She is not in acute distress.  HENT:      Head: Normocephalic and atraumatic.      Mouth/Throat:      Pharynx: No oropharyngeal exudate.   Eyes:      General: No scleral icterus.        Right eye: No discharge.         Left eye: No discharge.   Neck:      Thyroid: No thyromegaly.      Trachea: No tracheal deviation.   Cardiovascular:      Rate and Rhythm: Regular rhythm. Tachycardia present.      Heart sounds: Normal heart sounds. No murmur heard.  Pulmonary:      Effort: Pulmonary effort is normal. No respiratory distress.      Breath sounds: Normal breath sounds. Decreased air movement present. No wheezing or rales.   Abdominal:      General: Bowel sounds are normal. There is no distension.      Palpations: Abdomen is soft.      Tenderness: There is no abdominal tenderness.   Musculoskeletal:         General: No tenderness. Normal range of motion.      Cervical back: Normal range of motion and neck supple.      Right lower leg: Edema present.      Left lower leg: Edema present.   Lymphadenopathy:      Cervical: No cervical adenopathy.   Skin:     General: Skin is warm and dry.      Findings: No rash.   Neurological:      Mental Status: She is alert and oriented to person, place, and time.      Cranial Nerves: No cranial nerve " deficit.      Gait: Gait is intact.   Psychiatric:         Mood and Affect: Mood and affect normal.       Objective:      Laboratory Data:  Labs reviewed.      Lab Results   Component Value Date    WBC 5.09 01/05/2023    HGB 8.8 (L) 01/05/2023    HCT 26.7 (L) 01/05/2023    MCV 97 01/05/2023     01/05/2023    LABPROT 10.3 09/12/2022     (L) 01/05/2023    K 4.5 01/05/2023     01/05/2023    CO2 23 01/05/2023    BUN 20 01/05/2023    CREATININE 1.0 01/05/2023    ALT 13 01/05/2023    AST 13 01/05/2023    ALKPHOS 106 01/05/2023    BILITOT 0.4 01/05/2023    MG 0.8 (LL) 01/05/2023         Imaging Data:  PET scan 10/7/2020:  Impression:     1. Two right upper lobe nodule with no appreciable activity.  Please note that FDG PET has poor sensitivity for small, well differentiated, and/or indolent malignancies.  An additional subcentimeter node in the apex is too small to characterize by PET for which attention on follow-up is recommended.  2. Mildly enlarged mediastinal lymph nodes, as above, with no hypermetabolic activity.  Metastatic involvement is not excluded.  3. Additional CT findings, as above.     CT head 9/25/20:  Impression:     No evidence of acute intracranial pathology.     Moderate patchy mucoperiosteal thickening in the paranasal sinuses.    CT chest 2/11/2021:  Development of central cavitation in previously described 12 mm right upper lobe pulmonary nodule which may be inflammatory in nature including tuberculous and non tuberculous mycobacterial infection, fungal infection etc.  Neoplasm is also included in the differential.     Improved subpleural right upper lobe nodular opacity and stable ground-glass nodule right apex.     Stable calcified granuloma and postinflammatory changes in the lingula probably related to previous non tuberculous mycobacterial infection.     Severe atherosclerosis and severe hepatic steatosis            Assessment and Plan:      1. Recurrent adenocarcinoma of left  lung    2. Secondary malignant neoplasm of intrathoracic lymph nodes    3. Anemia of chronic disease    4. Severe obesity    5. Chronic respiratory failure with hypoxia    6. Hypomagnesemia    7. Swelling of lower leg    8. Edema, unspecified type              Recurrent lung cancer   Pt had adenocarcinoma which was treated with SBRT in Oct/Nov 2020 with Dr Mead. Recent imaging with CT and PET concerning for recurrence and jamal involvement. She is poor surgical candidate. We discussed weekly chemo along with radiation. If pt is not able to tolerate the chemo, will dose reduce or discontinue chemo. Pt understands the risks and benefits and prefers to try chemo with radiation with the understanding that if treatment becomes too uncomfortable we will hold chemo and pursue with radiation alone.   Had repeat biopsy procedure, couldn't get tissue, had PTX, req chest tube  Repeat imaging concerning for recurrent and progression of ds. Case d/w Dr Mead over phone on 11/7/22, concurrent chemo radiation to be tried. Plan for weekly carbo taxol . If pt is unable to tolerate chemo or has transportation issues, Dr Mead plans to change back to hypo fractionated course of radiation. Due to performance status and cor morbidities pt is not a candidate for Q3 week chemotherapy.   Labs reviewed, adequate , proceed with chemo on 1/6/2023.  low mag- replaced on 1/5/23 . Ending radiation in January.        Leg swelling R >L   US ordered     LESLY- on venofer, monitor.    Pancreatic lesion- being followed by Dr Cheema.             Future Appointments   Date Time Provider Department Center   1/6/2023 11:15 AM CHEMO 04 [facility] CHEMO [facility]   1/25/2023  1:30 PM Yane Sahni MD [facility] IM [facility]   4/5/2023  1:00 PM [facility] PS[facility]CH [facility]

## 2023-01-04 NOTE — PROGRESS NOTES
"The patient location is: [place], [place]  The chief complaint leading to consultation is: depression f/u    Visit type: audio only      Each patient to whom he or she provides medical services by telemedicine is:  (1) informed of the relationship between the physician and patient and the respective role of any other health care provider with respect to management of the patient; and (2) notified that he or she may decline to receive medical services by telemedicine and may withdraw from such care at any time.    Notes:       Outpatient Psychiatry Follow-Up Visit (MD/SANDRA)    1/4/2023    Clinical Status of Patient:  Outpatient (Ambulatory)    Chief Complaint:  Nalini Varma is a 72 y.o. female who presents today for follow-up of depression, anxiety and adjustment problems.  Met with patient.      Interval History and Content of Current Session:  Interim Events/Subjective Report/Content of Current Session:    Pt reports today: "its been tough, radiation treatment and dealing with chemo, I was hospitalized and put in ICU and decreased blood pressure over xmas"    Patients mood is steady. Linear and logical, friendly and cooperative, good eye contact.    Pt was sent for emotional support animal letter, waiting until chemo and radiation treatments are over before she gets a cat. Pt excited regarding this    Denies SI/HI/AVH. Pt reports sleeping well and normal appetite. Denies side effects of medications.    Pt reports taking medications as prescribed and behaving appropriately during interview today.        Prior visit:    Pt reports today: "having a rough time. I spent most of my money for this month for everything I needed at [facility] and it didn't get delivered and id dint get my money back". Pt facing financial stress due to pay for groceries for month at [facility] and was not delivered, spent yesterday on phone with [facility] billing and having difficulty getting her funds back.    Pt very stressed and anxious " "regarding this. Discussed having a low dose xanax 0.25mg prn anxiety as pt dealing with significant health issues and other daily life issues. Pt agreeable to plan    Wrote emotional support animal letter and will mail to patient so her landlord will allow her to have a emotional support animal in her apartment. Letter will be mailed to patient.    Pt mood notable improved when told letter was written so she could get a cat as a emotional support animal.    States depression improving since last visit after switching back to trazodone from remeron which improved her sleep "yes overall my depression has been better"    Patients mood is anxious and upset, affect appears mood congruent. Linear and logical, friendly and cooperative, good eye contact.    Denies SI/HI/AVH. Pt reports sleeping well and normal appetite. Denies side effects of medications.    Pt reports taking medications as prescribed and behaving appropriately during interview today.      Review of Systems       Psychiatric Review Of Systems - Is patient experiencing or having changes in:  sleep: yes  appetite: no  weight: no  energy/anergy: yes  interest/pleasure/anhedonia: yes  somatic symptoms: no  libido: no  anxiety/panic: yes  guilty/hopelessness: yes  concentration: no  S.I.B.s/risky behavior: no  Irritability: yes  Racing thoughts: yes  Impulsive behaviors: no  Paranoia: no  AVH: no      Past Medical, Family and Social History: The patient's past medical, family and social history have been reviewed and updated as appropriate within the electronic medical record - see encounter notes.      Current Medications:   Medication List with Changes/Refills   Current Medications    ALBUTEROL (VENTOLIN HFA) 90 MCG/ACTUATION INHALER    inhale 1-2 puffs as needed every 6 hours for wheezing and shortness of breath    ALPRAZOLAM (XANAX) 0.25 MG TABLET    Take 1 tablet (0.25 mg total) by mouth daily as needed for Anxiety.    ASCORBIC ACID, VITAMIN C, (VITAMIN C) " 250 MG TABLET    Take 250 mg by mouth once daily.    ATORVASTATIN (LIPITOR) 40 MG TABLET    TAKE 1 TABLET BY MOUTH EVERY DAILY    AZELASTINE (ASTELIN) 137 MCG (0.1 %) NASAL SPRAY    Instill 1 spray (137 mcg total) by Nasal route 2 (two) times daily.    B COMPLEX VITAMINS CAPSULE    Take 1 capsule by mouth once daily.    BIOTIN 10 MG TAB    Take 10 mg by mouth once daily.    CALCIUM CARBONATE (OS-SANDRINE) 500 MG CALCIUM (1,250 MG) TABLET    Take 2 tablets (1,000 mg total) by mouth 2 (two) times daily.    CYANOCOBALAMIN, VITAMIN B-12, 1,000 MCG TBSR    Take 1,000 mcg by mouth once daily.    DENOSUMAB (PROLIA) 60 MG/ML SYRG    Inject 1 mL (60 mg total) into the skin every 6 (six) months.    DULOXETINE (CYMBALTA) 60 MG CAPSULE    Take 1 capsule (60 mg total) by mouth once daily.    FLUTICASONE (FLONASE) 50 MCG/ACTUATION NASAL SPRAY    1 spray by Each Nare route 2 (two) times daily as needed for Rhinitis.    FLUTICASONE PROPION-SALMETEROL 115-21 MCG/DOSE (ADVAIR HFA) 115-21 MCG/ACTUATION HFAA INHALER    Inhale 2 puffs into the lungs every 12 (twelve) hours.    GABAPENTIN (NEURONTIN) 300 MG CAPSULE    Take 1 capsule (300 mg total) by mouth 3 (three) times daily.    GUAIFENESIN (MUCINEX) 600 MG 12 HR TABLET    Take 1,200 mg by mouth 2 (two) times daily as needed for Congestion.    HYDROCODONE-ACETAMINOPHEN (NORCO)  MG PER TABLET    Take 1 tablet by mouth every 12 (twelve) hours as needed for Pain.    LOSARTAN (COZAAR) 100 MG TABLET    Take 1 tablet (100 mg total) by mouth once daily.    LUTEIN 40 MG CAP    Take by mouth.    MAGNESIUM OXIDE (MAG-OX) 400 MG (241.3 MG MAGNESIUM) TABLET    Take 1 tablet by mouth every 12 (twelve) hours.    METOPROLOL SUCCINATE (TOPROL-XL) 25 MG 24 HR TABLET    Take 2 tablets (50 mg total) by mouth once daily.    MULTIVIT-MIN/IRON/FOLIC/LUTEIN (CENTRUM SILVER WOMEN ORAL)    Take 1 tablet by mouth once daily.    ONDANSETRON (ZOFRAN-ODT) 8 MG TBDL    dissolve 1 tablet (8 mg total) by mouth  every 8 (eight) hours as needed (nausea).    PANTOPRAZOLE (PROTONIX) 40 MG TABLET    Take 1 tablet (40 mg total) by mouth once daily.    PROMETHAZINE (PHENERGAN) 25 MG TABLET    Take 1 tablet (25 mg total) by mouth every 6 (six) hours as needed for Nausea.    TORSEMIDE (DEMADEX) 20 MG TAB    Take 1 tablet (20 mg total) by mouth once daily.    TRAZODONE (DESYREL) 100 MG TABLET    Take 1 tablet (100 mg total) by mouth every evening.    UMECLIDINIUM (INCRUSE ELLIPTA) 62.5 MCG/ACTUATION INHALATION CAPSULE    Inhale 1 puff into the lungs once daily. Controller    VITAMIN D (VITAMIN D3) 1000 UNITS TAB    Take 1 tablet (1,000 Units total) by mouth once daily.    ZINC ORAL    Take by mouth.         Allergies:   Review of patient's allergies indicates:   Allergen Reactions    Wellbutrin [bupropion hcl] Other (See Comments)     Hyponatremia and hypomagnesia     Zoloft [sertraline] Other (See Comments)     Hyponatremia and hypomagnesia     Bananas [banana]      Emesis, and stomach cramps    Patient states she is not allergic to Banana         Vitals   There were no vitals filed for this visit.       Labs/Imaging/Studies:   No results found for this or any previous visit (from the past 48 hour(s)).   No results found for: PHENYTOIN, PHENOBARB, VALPROATE, CBMZ    Compliance: yes    Side effects: None    Risk Parameters:  Patient reports no suicidal ideation  Patient reports no homicidal ideation  Patient reports no self-injurious behavior  Patient reports no violent behavior    Exam (detailed: at least 9 elements; comprehensive: all 15 elements)   Constitutional  Vitals:  Most recent vital signs, dated less than 90 days prior to this appointment, were reviewed.   There were no vitals filed for this visit.     General:  unremarkable, age appropriate     Musculoskeletal  Muscle Strength/Tone:  not examined   Gait & Station:  Not examined     Psychiatric  Speech:  no latency; no press   Mood & Affect:  steady   Thought Process:   normal and logical   Associations:  intact   Thought Content:  normal, no suicidality, no homicidality, delusions, or paranoia   Insight:  intact, has awareness of illness   Judgement: behavior is adequate to circumstances   Orientation:  grossly intact   Memory: intact for content of interview   Language: grossly intact   Attention Span & Concentration:  able to focus   Fund of Knowledge:  intact and appropriate to age and level of education     Assessment and Diagnosis   Status/Progress: Based on the examination today, the patient's problem(s) is/are inadequately controlled.  New problems have been presented today.   Co-morbidities are complicating management of the primary condition.  There are no active rule-out diagnoses for this patient at this time.     General Impression:      ICD-10-CM ICD-9-CM   1. MDD (major depressive disorder), recurrent severe, without psychosis  F33.2 296.33   2. PTSD (post-traumatic stress disorder)  F43.10 309.81             Intervention/Counseling/Treatment Plan   Medication Management: Continue current medications. The risks and benefits of medication were discussed with the patient.    -cymbalta to 60mg daily  -trazodone at 100mg qhs    -continue xanax 0.25mg daily prn severe anxiety. Instructed pt to use sparingly    -continue current vitamin D supplement    -prescribed emotional support animal. [facility]      Return to Clinic: 3 months      Leonardo Kolb PA-C      Total patient contact time: 13 min  Total time (chart review, patient contact, documentation): 21 min      *This note has been prepared using a combination of a dictation device and typing.  It has been checked for errors but some errors may still exist within the note as a result of speech recognition errors and/or typographical errors.

## 2023-01-05 ENCOUNTER — INFUSION (OUTPATIENT)
Dept: INFUSION THERAPY | Facility: OTHER | Age: 73
End: 2023-01-05
Attending: INTERNAL MEDICINE
Payer: MEDICARE

## 2023-01-05 ENCOUNTER — LAB VISIT (OUTPATIENT)
Dept: LAB | Facility: OTHER | Age: 73
End: 2023-01-05
Attending: INTERNAL MEDICINE
Payer: MEDICARE

## 2023-01-05 ENCOUNTER — OFFICE VISIT (OUTPATIENT)
Dept: HEMATOLOGY/ONCOLOGY | Facility: CLINIC | Age: 73
End: 2023-01-05
Payer: MEDICARE

## 2023-01-05 VITALS
DIASTOLIC BLOOD PRESSURE: 59 MMHG | HEIGHT: 66 IN | HEART RATE: 94 BPM | RESPIRATION RATE: 16 BRPM | SYSTOLIC BLOOD PRESSURE: 137 MMHG | TEMPERATURE: 98 F | BODY MASS INDEX: 36.33 KG/M2 | OXYGEN SATURATION: 98 %

## 2023-01-05 DIAGNOSIS — D50.0 IRON DEFICIENCY ANEMIA DUE TO CHRONIC BLOOD LOSS: ICD-10-CM

## 2023-01-05 DIAGNOSIS — C34.92 RECURRENT ADENOCARCINOMA OF LEFT LUNG: ICD-10-CM

## 2023-01-05 DIAGNOSIS — E66.01 SEVERE OBESITY: Chronic | ICD-10-CM

## 2023-01-05 DIAGNOSIS — R60.9 EDEMA, UNSPECIFIED TYPE: ICD-10-CM

## 2023-01-05 DIAGNOSIS — C34.92 RECURRENT ADENOCARCINOMA OF LEFT LUNG: Primary | Chronic | ICD-10-CM

## 2023-01-05 DIAGNOSIS — C77.1 SECONDARY MALIGNANT NEOPLASM OF INTRATHORACIC LYMPH NODES: Chronic | ICD-10-CM

## 2023-01-05 DIAGNOSIS — C34.92 RECURRENT ADENOCARCINOMA OF LEFT LUNG: Primary | ICD-10-CM

## 2023-01-05 DIAGNOSIS — J96.11 CHRONIC RESPIRATORY FAILURE WITH HYPOXIA: ICD-10-CM

## 2023-01-05 DIAGNOSIS — E83.42 HYPOMAGNESEMIA: ICD-10-CM

## 2023-01-05 DIAGNOSIS — D63.8 ANEMIA OF CHRONIC DISEASE: ICD-10-CM

## 2023-01-05 DIAGNOSIS — M79.89 SWELLING OF LOWER LEG: ICD-10-CM

## 2023-01-05 LAB
ALBUMIN SERPL BCP-MCNC: 2.6 G/DL (ref 3.5–5.2)
ALP SERPL-CCNC: 106 U/L (ref 55–135)
ALT SERPL W/O P-5'-P-CCNC: 13 U/L (ref 10–44)
ANION GAP SERPL CALC-SCNC: 8 MMOL/L (ref 8–16)
AST SERPL-CCNC: 13 U/L (ref 10–40)
BASOPHILS # BLD AUTO: 0.01 K/UL (ref 0–0.2)
BASOPHILS NFR BLD: 0.2 % (ref 0–1.9)
BILIRUB SERPL-MCNC: 0.4 MG/DL (ref 0.1–1)
BUN SERPL-MCNC: 20 MG/DL (ref 8–23)
CALCIUM SERPL-MCNC: 9.2 MG/DL (ref 8.7–10.5)
CHLORIDE SERPL-SCNC: 101 MMOL/L (ref 95–110)
CO2 SERPL-SCNC: 23 MMOL/L (ref 23–29)
CREAT SERPL-MCNC: 1 MG/DL (ref 0.5–1.4)
DIFFERENTIAL METHOD: ABNORMAL
EOSINOPHIL # BLD AUTO: 0.1 K/UL (ref 0–0.5)
EOSINOPHIL NFR BLD: 1.6 % (ref 0–8)
ERYTHROCYTE [DISTWIDTH] IN BLOOD BY AUTOMATED COUNT: 16.3 % (ref 11.5–14.5)
EST. GFR  (NO RACE VARIABLE): 59 ML/MIN/1.73 M^2
GLUCOSE SERPL-MCNC: 97 MG/DL (ref 70–110)
HCT VFR BLD AUTO: 26.7 % (ref 37–48.5)
HGB BLD-MCNC: 8.8 G/DL (ref 12–16)
IMM GRANULOCYTES # BLD AUTO: 0.03 K/UL (ref 0–0.04)
IMM GRANULOCYTES NFR BLD AUTO: 0.6 % (ref 0–0.5)
LYMPHOCYTES # BLD AUTO: 1.2 K/UL (ref 1–4.8)
LYMPHOCYTES NFR BLD: 22.6 % (ref 18–48)
MAGNESIUM SERPL-MCNC: 0.8 MG/DL (ref 1.6–2.6)
MCH RBC QN AUTO: 32.1 PG (ref 27–31)
MCHC RBC AUTO-ENTMCNC: 33 G/DL (ref 32–36)
MCV RBC AUTO: 97 FL (ref 82–98)
MONOCYTES # BLD AUTO: 0.7 K/UL (ref 0.3–1)
MONOCYTES NFR BLD: 12.8 % (ref 4–15)
NEUTROPHILS # BLD AUTO: 3.2 K/UL (ref 1.8–7.7)
NEUTROPHILS NFR BLD: 62.2 % (ref 38–73)
NRBC BLD-RTO: 0 /100 WBC
PLATELET # BLD AUTO: 174 K/UL (ref 150–450)
PMV BLD AUTO: 9.8 FL (ref 9.2–12.9)
POTASSIUM SERPL-SCNC: 4.5 MMOL/L (ref 3.5–5.1)
PROT SERPL-MCNC: 5.7 G/DL (ref 6–8.4)
RBC # BLD AUTO: 2.74 M/UL (ref 4–5.4)
SODIUM SERPL-SCNC: 132 MMOL/L (ref 136–145)
WBC # BLD AUTO: 5.09 K/UL (ref 3.9–12.7)

## 2023-01-05 PROCEDURE — 77386 HC IMRT, COMPLEX: CPT | Performed by: RADIOLOGY

## 2023-01-05 PROCEDURE — 1111F DSCHRG MED/CURRENT MED MERGE: CPT | Mod: CPTII,S$GLB,, | Performed by: STUDENT IN AN ORGANIZED HEALTH CARE EDUCATION/TRAINING PROGRAM

## 2023-01-05 PROCEDURE — 99215 OFFICE O/P EST HI 40 MIN: CPT | Mod: S$GLB,,, | Performed by: STUDENT IN AN ORGANIZED HEALTH CARE EDUCATION/TRAINING PROGRAM

## 2023-01-05 PROCEDURE — 3075F PR MOST RECENT SYSTOLIC BLOOD PRESS GE 130-139MM HG: ICD-10-PCS | Mod: CPTII,S$GLB,, | Performed by: STUDENT IN AN ORGANIZED HEALTH CARE EDUCATION/TRAINING PROGRAM

## 2023-01-05 PROCEDURE — 36415 COLL VENOUS BLD VENIPUNCTURE: CPT | Performed by: STUDENT IN AN ORGANIZED HEALTH CARE EDUCATION/TRAINING PROGRAM

## 2023-01-05 PROCEDURE — 83735 ASSAY OF MAGNESIUM: CPT | Performed by: STUDENT IN AN ORGANIZED HEALTH CARE EDUCATION/TRAINING PROGRAM

## 2023-01-05 PROCEDURE — 3078F PR MOST RECENT DIASTOLIC BLOOD PRESSURE < 80 MM HG: ICD-10-PCS | Mod: CPTII,S$GLB,, | Performed by: STUDENT IN AN ORGANIZED HEALTH CARE EDUCATION/TRAINING PROGRAM

## 2023-01-05 PROCEDURE — 1125F AMNT PAIN NOTED PAIN PRSNT: CPT | Mod: CPTII,S$GLB,, | Performed by: STUDENT IN AN ORGANIZED HEALTH CARE EDUCATION/TRAINING PROGRAM

## 2023-01-05 PROCEDURE — 3075F SYST BP GE 130 - 139MM HG: CPT | Mod: CPTII,S$GLB,, | Performed by: STUDENT IN AN ORGANIZED HEALTH CARE EDUCATION/TRAINING PROGRAM

## 2023-01-05 PROCEDURE — 3078F DIAST BP <80 MM HG: CPT | Mod: CPTII,S$GLB,, | Performed by: STUDENT IN AN ORGANIZED HEALTH CARE EDUCATION/TRAINING PROGRAM

## 2023-01-05 PROCEDURE — 99215 PR OFFICE/OUTPT VISIT, EST, LEVL V, 40-54 MIN: ICD-10-PCS | Mod: S$GLB,,, | Performed by: STUDENT IN AN ORGANIZED HEALTH CARE EDUCATION/TRAINING PROGRAM

## 2023-01-05 PROCEDURE — 3008F BODY MASS INDEX DOCD: CPT | Mod: CPTII,S$GLB,, | Performed by: STUDENT IN AN ORGANIZED HEALTH CARE EDUCATION/TRAINING PROGRAM

## 2023-01-05 PROCEDURE — 1111F PR DISCHARGE MEDS RECONCILED W/ CURRENT OUTPATIENT MED LIST: ICD-10-PCS | Mod: CPTII,S$GLB,, | Performed by: STUDENT IN AN ORGANIZED HEALTH CARE EDUCATION/TRAINING PROGRAM

## 2023-01-05 PROCEDURE — 77014 PR  CT GUIDANCE PLACEMENT RAD THERAPY FIELDS: ICD-10-PCS | Mod: 26,,, | Performed by: RADIOLOGY

## 2023-01-05 PROCEDURE — 96365 THER/PROPH/DIAG IV INF INIT: CPT

## 2023-01-05 PROCEDURE — 3288F FALL RISK ASSESSMENT DOCD: CPT | Mod: CPTII,S$GLB,, | Performed by: STUDENT IN AN ORGANIZED HEALTH CARE EDUCATION/TRAINING PROGRAM

## 2023-01-05 PROCEDURE — 96366 THER/PROPH/DIAG IV INF ADDON: CPT

## 2023-01-05 PROCEDURE — 1159F MED LIST DOCD IN RCRD: CPT | Mod: CPTII,S$GLB,, | Performed by: STUDENT IN AN ORGANIZED HEALTH CARE EDUCATION/TRAINING PROGRAM

## 2023-01-05 PROCEDURE — 99999 PR PBB SHADOW E&M-EST. PATIENT-LVL III: ICD-10-PCS | Mod: PBBFAC,,, | Performed by: STUDENT IN AN ORGANIZED HEALTH CARE EDUCATION/TRAINING PROGRAM

## 2023-01-05 PROCEDURE — 63600175 PHARM REV CODE 636 W HCPCS: Performed by: STUDENT IN AN ORGANIZED HEALTH CARE EDUCATION/TRAINING PROGRAM

## 2023-01-05 PROCEDURE — 1125F PR PAIN SEVERITY QUANTIFIED, PAIN PRESENT: ICD-10-PCS | Mod: CPTII,S$GLB,, | Performed by: STUDENT IN AN ORGANIZED HEALTH CARE EDUCATION/TRAINING PROGRAM

## 2023-01-05 PROCEDURE — 3008F PR BODY MASS INDEX (BMI) DOCUMENTED: ICD-10-PCS | Mod: CPTII,S$GLB,, | Performed by: STUDENT IN AN ORGANIZED HEALTH CARE EDUCATION/TRAINING PROGRAM

## 2023-01-05 PROCEDURE — 1101F PR PT FALLS ASSESS DOC 0-1 FALLS W/OUT INJ PAST YR: ICD-10-PCS | Mod: CPTII,S$GLB,, | Performed by: STUDENT IN AN ORGANIZED HEALTH CARE EDUCATION/TRAINING PROGRAM

## 2023-01-05 PROCEDURE — 85025 COMPLETE CBC W/AUTO DIFF WBC: CPT | Performed by: STUDENT IN AN ORGANIZED HEALTH CARE EDUCATION/TRAINING PROGRAM

## 2023-01-05 PROCEDURE — 1101F PT FALLS ASSESS-DOCD LE1/YR: CPT | Mod: CPTII,S$GLB,, | Performed by: STUDENT IN AN ORGANIZED HEALTH CARE EDUCATION/TRAINING PROGRAM

## 2023-01-05 PROCEDURE — 77014 HC CT GUIDANCE RADIATION THERAPY FLDS PLACEMENT: CPT | Mod: TC | Performed by: RADIOLOGY

## 2023-01-05 PROCEDURE — 77014 PR  CT GUIDANCE PLACEMENT RAD THERAPY FIELDS: CPT | Mod: 26,,, | Performed by: RADIOLOGY

## 2023-01-05 PROCEDURE — 99999 PR PBB SHADOW E&M-EST. PATIENT-LVL III: CPT | Mod: PBBFAC,,, | Performed by: STUDENT IN AN ORGANIZED HEALTH CARE EDUCATION/TRAINING PROGRAM

## 2023-01-05 PROCEDURE — 80053 COMPREHEN METABOLIC PANEL: CPT | Performed by: STUDENT IN AN ORGANIZED HEALTH CARE EDUCATION/TRAINING PROGRAM

## 2023-01-05 PROCEDURE — 3288F PR FALLS RISK ASSESSMENT DOCUMENTED: ICD-10-PCS | Mod: CPTII,S$GLB,, | Performed by: STUDENT IN AN ORGANIZED HEALTH CARE EDUCATION/TRAINING PROGRAM

## 2023-01-05 PROCEDURE — 1159F PR MEDICATION LIST DOCUMENTED IN MEDICAL RECORD: ICD-10-PCS | Mod: CPTII,S$GLB,, | Performed by: STUDENT IN AN ORGANIZED HEALTH CARE EDUCATION/TRAINING PROGRAM

## 2023-01-05 RX ORDER — FAMOTIDINE 10 MG/ML
20 INJECTION INTRAVENOUS
Status: CANCELLED | OUTPATIENT
Start: 2023-01-06 | End: 2023-01-06

## 2023-01-05 RX ORDER — MAGNESIUM SULFATE HEPTAHYDRATE 40 MG/ML
4 INJECTION, SOLUTION INTRAVENOUS
Start: 2023-01-05

## 2023-01-05 RX ORDER — EPINEPHRINE 0.3 MG/.3ML
0.3 INJECTION SUBCUTANEOUS ONCE AS NEEDED
Status: CANCELLED | OUTPATIENT
Start: 2023-01-06

## 2023-01-05 RX ORDER — HEPARIN 100 UNIT/ML
500 SYRINGE INTRAVENOUS
Status: CANCELLED | OUTPATIENT
Start: 2023-01-06

## 2023-01-05 RX ORDER — DIPHENHYDRAMINE HYDROCHLORIDE 50 MG/ML
50 INJECTION INTRAMUSCULAR; INTRAVENOUS ONCE AS NEEDED
Status: CANCELLED | OUTPATIENT
Start: 2023-01-06

## 2023-01-05 RX ORDER — SODIUM CHLORIDE 0.9 % (FLUSH) 0.9 %
10 SYRINGE (ML) INJECTION
Status: CANCELLED | OUTPATIENT
Start: 2023-01-06

## 2023-01-05 RX ORDER — MAGNESIUM SULFATE HEPTAHYDRATE 40 MG/ML
4 INJECTION, SOLUTION INTRAVENOUS
Status: COMPLETED | OUTPATIENT
Start: 2023-01-05 | End: 2023-01-05

## 2023-01-05 RX ADMIN — MAGNESIUM SULFATE IN WATER 4 G: 40 INJECTION, SOLUTION INTRAVENOUS at 12:01

## 2023-01-05 NOTE — PLAN OF CARE
Magnesium 4 grams IVPB  administered, no reaction. Patient tolerated well. 24 gauge IV removed, catheter intact. No apparent distress noted. Discharge instructions given to patient. Patient understands instructions. Follow up appointment scheduled.

## 2023-01-05 NOTE — PLAN OF CARE
Pt. On day 21 of outpt. Xrt to lung.  Pt. Hospitalized last week due to dehydration and r/o sepsis.  No dysphagia +cough.  02 2L (home 02).

## 2023-01-06 ENCOUNTER — DOCUMENTATION ONLY (OUTPATIENT)
Dept: RADIATION ONCOLOGY | Facility: CLINIC | Age: 73
End: 2023-01-06
Payer: MEDICARE

## 2023-01-06 ENCOUNTER — INFUSION (OUTPATIENT)
Dept: INFUSION THERAPY | Facility: OTHER | Age: 73
End: 2023-01-06
Attending: STUDENT IN AN ORGANIZED HEALTH CARE EDUCATION/TRAINING PROGRAM
Payer: MEDICARE

## 2023-01-06 ENCOUNTER — HOSPITAL ENCOUNTER (OUTPATIENT)
Dept: RADIOLOGY | Facility: OTHER | Age: 73
Discharge: HOME | End: 2023-01-06
Attending: STUDENT IN AN ORGANIZED HEALTH CARE EDUCATION/TRAINING PROGRAM
Payer: MEDICARE

## 2023-01-06 VITALS
HEIGHT: 66 IN | OXYGEN SATURATION: 100 % | RESPIRATION RATE: 16 BRPM | BODY MASS INDEX: 34.9 KG/M2 | HEART RATE: 74 BPM | TEMPERATURE: 98 F | SYSTOLIC BLOOD PRESSURE: 124 MMHG | DIASTOLIC BLOOD PRESSURE: 57 MMHG | WEIGHT: 217.13 LBS

## 2023-01-06 DIAGNOSIS — M79.89 SWELLING OF LOWER LEG: ICD-10-CM

## 2023-01-06 DIAGNOSIS — R60.9 EDEMA, UNSPECIFIED TYPE: ICD-10-CM

## 2023-01-06 DIAGNOSIS — C34.92 RECURRENT ADENOCARCINOMA OF LEFT LUNG: Primary | ICD-10-CM

## 2023-01-06 PROCEDURE — 77014 PR  CT GUIDANCE PLACEMENT RAD THERAPY FIELDS: CPT | Mod: 26,,, | Performed by: RADIOLOGY

## 2023-01-06 PROCEDURE — 93970 EXTREMITY STUDY: CPT | Mod: TC

## 2023-01-06 PROCEDURE — 25000003 PHARM REV CODE 250: Performed by: STUDENT IN AN ORGANIZED HEALTH CARE EDUCATION/TRAINING PROGRAM

## 2023-01-06 PROCEDURE — 96367 TX/PROPH/DG ADDL SEQ IV INF: CPT

## 2023-01-06 PROCEDURE — 77014 HC CT GUIDANCE RADIATION THERAPY FLDS PLACEMENT: CPT | Mod: TC | Performed by: RADIOLOGY

## 2023-01-06 PROCEDURE — 93970 EXTREMITY STUDY: CPT | Mod: 26,,, | Performed by: STUDENT IN AN ORGANIZED HEALTH CARE EDUCATION/TRAINING PROGRAM

## 2023-01-06 PROCEDURE — 93970 US LOWER EXTREMITY VEINS BILATERAL: ICD-10-PCS | Mod: 26,,, | Performed by: STUDENT IN AN ORGANIZED HEALTH CARE EDUCATION/TRAINING PROGRAM

## 2023-01-06 PROCEDURE — 96375 TX/PRO/DX INJ NEW DRUG ADDON: CPT

## 2023-01-06 PROCEDURE — 77014 PR  CT GUIDANCE PLACEMENT RAD THERAPY FIELDS: ICD-10-PCS | Mod: 26,,, | Performed by: RADIOLOGY

## 2023-01-06 PROCEDURE — 96417 CHEMO IV INFUS EACH ADDL SEQ: CPT

## 2023-01-06 PROCEDURE — 77386 HC IMRT, COMPLEX: CPT | Performed by: RADIOLOGY

## 2023-01-06 PROCEDURE — 63600175 PHARM REV CODE 636 W HCPCS: Performed by: STUDENT IN AN ORGANIZED HEALTH CARE EDUCATION/TRAINING PROGRAM

## 2023-01-06 PROCEDURE — 96413 CHEMO IV INFUSION 1 HR: CPT

## 2023-01-06 RX ORDER — HEPARIN 100 UNIT/ML
500 SYRINGE INTRAVENOUS
Status: DISCONTINUED | OUTPATIENT
Start: 2023-01-06 | End: 2023-01-06 | Stop reason: HOSPADM

## 2023-01-06 RX ORDER — FAMOTIDINE 10 MG/ML
20 INJECTION INTRAVENOUS
Status: COMPLETED | OUTPATIENT
Start: 2023-01-06 | End: 2023-01-06

## 2023-01-06 RX ORDER — EPINEPHRINE 0.3 MG/.3ML
0.3 INJECTION SUBCUTANEOUS ONCE AS NEEDED
Status: DISCONTINUED | OUTPATIENT
Start: 2023-01-06 | End: 2023-01-06 | Stop reason: HOSPADM

## 2023-01-06 RX ORDER — SODIUM CHLORIDE 0.9 % (FLUSH) 0.9 %
10 SYRINGE (ML) INJECTION
Status: DISCONTINUED | OUTPATIENT
Start: 2023-01-06 | End: 2023-01-06 | Stop reason: HOSPADM

## 2023-01-06 RX ORDER — DIPHENHYDRAMINE HYDROCHLORIDE 50 MG/ML
50 INJECTION INTRAMUSCULAR; INTRAVENOUS ONCE AS NEEDED
Status: DISCONTINUED | OUTPATIENT
Start: 2023-01-06 | End: 2023-01-06 | Stop reason: HOSPADM

## 2023-01-06 RX ADMIN — DIPHENHYDRAMINE HYDROCHLORIDE 50 MG: 50 INJECTION, SOLUTION INTRAMUSCULAR; INTRAVENOUS at 12:01

## 2023-01-06 RX ADMIN — DEXAMETHASONE SODIUM PHOSPHATE 0.25 MG: 4 INJECTION, SOLUTION INTRA-ARTICULAR; INTRALESIONAL; INTRAMUSCULAR; INTRAVENOUS; SOFT TISSUE at 12:01

## 2023-01-06 RX ADMIN — PACLITAXEL 84 MG: 6 INJECTION, SOLUTION INTRAVENOUS at 01:01

## 2023-01-06 RX ADMIN — FAMOTIDINE 20 MG: 10 INJECTION INTRAVENOUS at 12:01

## 2023-01-06 RX ADMIN — SODIUM CHLORIDE: 0.9 INJECTION, SOLUTION INTRAVENOUS at 11:01

## 2023-01-06 RX ADMIN — CARBOPLATIN 155 MG: 10 INJECTION, SOLUTION INTRAVENOUS at 02:01

## 2023-01-06 NOTE — PLAN OF CARE
Taxol/Carbo infusion complete. Pt tolerated well. VSS. NAD. IV DC'd.  Pt verbalized understanding of discharge instructions before leaving.

## 2023-01-06 NOTE — PROGRESS NOTES
Arranged for transportation to radiation appt thru [employer]. Emailed Lucila and provided with dates for transport (1/9/23 to 1/16/23 and 1/17/23 to 1/19/23).  requested for 12:45 pm. Notifiedpt. Of the above and she is in agreement with the plan. No other needs identified at this time. Will follow.

## 2023-01-09 ENCOUNTER — PATIENT MESSAGE (OUTPATIENT)
Dept: HEMATOLOGY/ONCOLOGY | Facility: CLINIC | Age: 73
End: 2023-01-09
Payer: MEDICARE

## 2023-01-09 PROCEDURE — G6002 STEREOSCOPIC X-RAY GUIDANCE: HCPCS | Mod: 26,,, | Performed by: RADIOLOGY

## 2023-01-09 PROCEDURE — G6002 PR STEREOSCOPIC XRAY GUIDE FOR RADIATION TX DELIV: ICD-10-PCS | Mod: 26,,, | Performed by: RADIOLOGY

## 2023-01-09 PROCEDURE — 77386 HC IMRT, COMPLEX: CPT | Performed by: RADIOLOGY

## 2023-01-11 NOTE — PHYSICIAN QUERY
PT Name: Nalini Varma  MR #: [id number]     DOCUMENTATION CLARIFICATION     CDS/: JORGE VictoriaN, RN, CCDS              Contact information: [email or web address]  This form is a permanent document in the medical record.     Query Date: January 11, 2023    By submitting this query, we are merely seeking further clarification of documentation.  Please utilize your independent clinical judgment when addressing the question(s) below.  The Medical Record contains the following:  Indicators Supporting Clinical Findings Location in Medical Record   X Acute Illness (e.g. AMI, Sepsis, etc.) recently diagnosed with lung adenocarcinoma and last received chemotherapy 1 week ago.      Suspect hypotension likely secondary to hypovolemia given diarrhea for the past week.  She does appear dehydrated on exam    Septic shock  - Shock presumed septic in setting of ?intraabdominal fluid collection / loose stool / hypotension not responsive to fluids alone    Septic shock  Suspect hypovolemic shock and hypotension from massive diarrheal fluid losses, possibly secondary to magnesium therapy    Finding of left lower quadrant air fluid level contained within a sigmoid diverticulum versus a pericolonic abscess from perforated sigmoid diverticulitis    Shock, unspecified    Shock, unspecified  Sepsis    - pt w/ diarrheal illness  Hypovolemic    - resuscitated with IVFs + `1 unit pRBC for possible hemorrhagic causes    - no evidence of GI bleed or other blood loss    - possible fluid down 2/2 to diarrhea with JONATHAN that resolved with fluids    being treated for undifferentiated shock    Admitted with shock suspected 2/2 sepsis. Evaluated for C difficile but negative; treated with empiric piperacillin-tazobactam  PRINCIPAL PROBLEM:  Shock, unspecified    ED Note Dr. Meyers 12/23            H & P: Dr. Austin 12/23        Gen Surg: Dr. Dillon 12/23                HM PN: Dr. Austin 12/24    Pulm: Dr. Ace 12/24                Pulm PN:  Dr. Ace 12/25    DCS: Dr. Austin 12/27   X Vital Signs  T: 98.1, 98.1, 98.2,   HR: 80, 77, 77,   RR: 20, 23, 34    afebrile without leukocytosis or shift, though chemotherapy may blunt immune response and patient does not have significant tenderness or peritonitis Flowsheet        Gen Surg: Dr. Dillon 12/23    Acidosis documented      ABGs/Labs WBC: 8.54, 5.41    Lactic Acid: 3.8, 1.9  Procal: 0.46   Lab: 12/23, 12/24    Lab: 12/23, 12/23  Lab: 12/23   X Hypotension or Low Blood Pressure documented She was going to a follow-up visit today when her blood pressure was noted to be 68/39 in clinic.  Repeat blood pressures within 20 minutes was 74/43 and 68/39   ED Note Dr. Meyers 12/23    Altered Mental Status or Confusion      Diaphoresis, Cold Extremities or Cyanosis      Oliguria     X Medication/Treatment  -Vasopressors  -Inotropic Drugs  -IV Fluids   -IV Antibiotics  -Cardiac Assist Devices  -Hemodynamic Monitoring  -Blood/Blood Products received IV hydration with 600mL NS in total without significant improvement in pressures.   She was started on norepinephrine gtt and received piperacillin-tazobactam    off pressors for a few hours but again had hypotension requiring low dose pressors overnight H & P: Dr. Austin 12/23        Pulm PN: Dr. Ace 12/25    Other       Provider, please clarify type of shock associated with above clinical findings.    [    ] Septic Shock   [    ] Hypovolemic Shock   [    ] Other Shock (please specify): __________   [  X  ] Shock, Unspecified   [  ] Clinically Undetermined           Please document in your progress notes daily for the duration of treatment until resolved and include in your discharge summary.     Form No. [id number]

## 2023-01-12 ENCOUNTER — PATIENT MESSAGE (OUTPATIENT)
Dept: HEMATOLOGY/ONCOLOGY | Facility: CLINIC | Age: 73
End: 2023-01-12
Payer: MEDICARE

## 2023-01-13 ENCOUNTER — DOCUMENTATION ONLY (OUTPATIENT)
Dept: HEMATOLOGY/ONCOLOGY | Facility: CLINIC | Age: 73
End: 2023-01-13
Payer: MEDICARE

## 2023-01-13 ENCOUNTER — OFFICE VISIT (OUTPATIENT)
Dept: HEMATOLOGY/ONCOLOGY | Facility: CLINIC | Age: 73
End: 2023-01-13
Payer: MEDICARE

## 2023-01-13 ENCOUNTER — TELEPHONE (OUTPATIENT)
Dept: HEMATOLOGY/ONCOLOGY | Facility: CLINIC | Age: 73
End: 2023-01-13
Payer: MEDICARE

## 2023-01-13 DIAGNOSIS — C34.92 RECURRENT ADENOCARCINOMA OF LEFT LUNG: Primary | ICD-10-CM

## 2023-01-13 DIAGNOSIS — R19.7 DIARRHEA, UNSPECIFIED TYPE: ICD-10-CM

## 2023-01-13 PROCEDURE — 99441 PR PHYSICIAN TELEPHONE EVALUATION 5-10 MIN: CPT | Mod: 95,,, | Performed by: STUDENT IN AN ORGANIZED HEALTH CARE EDUCATION/TRAINING PROGRAM

## 2023-01-13 PROCEDURE — 1160F PR REVIEW ALL MEDS BY PRESCRIBER/CLIN PHARMACIST DOCUMENTED: ICD-10-PCS | Mod: CPTII,95,, | Performed by: STUDENT IN AN ORGANIZED HEALTH CARE EDUCATION/TRAINING PROGRAM

## 2023-01-13 PROCEDURE — 1160F RVW MEDS BY RX/DR IN RCRD: CPT | Mod: CPTII,95,, | Performed by: STUDENT IN AN ORGANIZED HEALTH CARE EDUCATION/TRAINING PROGRAM

## 2023-01-13 PROCEDURE — 1111F DSCHRG MED/CURRENT MED MERGE: CPT | Mod: CPTII,95,, | Performed by: STUDENT IN AN ORGANIZED HEALTH CARE EDUCATION/TRAINING PROGRAM

## 2023-01-13 PROCEDURE — 1111F PR DISCHARGE MEDS RECONCILED W/ CURRENT OUTPATIENT MED LIST: ICD-10-PCS | Mod: CPTII,95,, | Performed by: STUDENT IN AN ORGANIZED HEALTH CARE EDUCATION/TRAINING PROGRAM

## 2023-01-13 PROCEDURE — 99441 PR PHYSICIAN TELEPHONE EVALUATION 5-10 MIN: ICD-10-PCS | Mod: 95,,, | Performed by: STUDENT IN AN ORGANIZED HEALTH CARE EDUCATION/TRAINING PROGRAM

## 2023-01-13 PROCEDURE — 1159F MED LIST DOCD IN RCRD: CPT | Mod: CPTII,95,, | Performed by: STUDENT IN AN ORGANIZED HEALTH CARE EDUCATION/TRAINING PROGRAM

## 2023-01-13 PROCEDURE — 1159F PR MEDICATION LIST DOCUMENTED IN MEDICAL RECORD: ICD-10-PCS | Mod: CPTII,95,, | Performed by: STUDENT IN AN ORGANIZED HEALTH CARE EDUCATION/TRAINING PROGRAM

## 2023-01-13 NOTE — PROGRESS NOTES
MAURY spoke to patient via phone. She explained that she has had diarrhea this past week. MAURY has notified Dr. Mead and messaged Dr. Gilmore with this concern. Patient felt she's receiving too much magnesium, Dr. Mead felt it may be the chemo and would reach out to Dr. Gilmore.    MAURY also messaged Lea Serrano RD, as patient had questions about diet while having diarrhea.    MAURY arranged transportation with [employer] for Tues-Thurs 12:45p pickup.

## 2023-01-13 NOTE — PROGRESS NOTES
Established Patient - Audio Only Telehealth Visit     The patient location is: [place]  The chief complaint leading to consultation is: lung cancer, diarrhea  Visit type: Virtual visit with audio only (telephone)  Total time spent with patient: 6 mins       The reason for the audio only service rather than synchronous audio and video virtual visit was related to technical difficulties or patient preference/necessity.     Each patient to whom I provide medical services by telemedicine is:  (1) informed of the relationship between the physician and patient and the respective role of any other health care provider with respect to management of the patient; and (2) notified that they may decline to receive medical services by telemedicine and may withdraw from such care at any time. Patient verbally consented to receive this service via voice-only telephone call.       HPI: lung cancer getting chemo radiation. C/o diarrhea.      Assessment and plan:       1. Recurrent adenocarcinoma of left lung    2. Diarrhea, unspecified type        Diarrhea - no fever, no blood in stool , no abdominal pain but cramp once in a while. Taking imodium which helped. Advised to take 4 mg of imodium for each loose stool upto 16 mg/24 hr.   No nausea. Hydration advised- gatorade, chicken noodle soup, lemonade, pedialyte etc.   Knows to go to ER if symptoms worsen.   Is symptoms are not controlled by next week , will consider getting xray.     RTC 1 month       This service was not originating from a related E/M service provided within the previous 7 days nor will  to an E/M service or procedure within the next 24 hours or my soonest available appointment.  Prevailing standard of care was able to be met in this audio-only visit.      I spent >20 mins on reviewing epic chart notes, reviewing tests, nursing concerns,obtaining history, performing physical exam, counseling and educating patient/family/caregiver, documentation,  independently interpreting results and discussing them with patient/family/caregiver, care coordination, ordering medications/ tests/ procedures and referring and communicating with other health care professionals.

## 2023-01-17 ENCOUNTER — HOSPITAL ENCOUNTER (INPATIENT)
Facility: OTHER | Age: 73
LOS: 7 days | Discharge: HOME HEALTH | DRG: 371 | End: 2023-01-24
Attending: EMERGENCY MEDICINE | Admitting: INTERNAL MEDICINE
Payer: MEDICARE

## 2023-01-17 ENCOUNTER — DOCUMENTATION ONLY (OUTPATIENT)
Dept: HEMATOLOGY/ONCOLOGY | Facility: CLINIC | Age: 73
End: 2023-01-17
Payer: MEDICARE

## 2023-01-17 ENCOUNTER — PATIENT MESSAGE (OUTPATIENT)
Dept: HEMATOLOGY/ONCOLOGY | Facility: CLINIC | Age: 73
End: 2023-01-17
Payer: MEDICARE

## 2023-01-17 ENCOUNTER — PATIENT MESSAGE (OUTPATIENT)
Dept: INTERNAL MEDICINE | Facility: CLINIC | Age: 73
End: 2023-01-17
Payer: MEDICARE

## 2023-01-17 DIAGNOSIS — E83.42 HYPOMAGNESEMIA: ICD-10-CM

## 2023-01-17 DIAGNOSIS — E86.1 HYPOVOLEMIA DEHYDRATION: ICD-10-CM

## 2023-01-17 DIAGNOSIS — R19.7 DIARRHEA, UNSPECIFIED TYPE: ICD-10-CM

## 2023-01-17 DIAGNOSIS — G89.4 CHRONIC PAIN SYNDROME: ICD-10-CM

## 2023-01-17 DIAGNOSIS — M17.0 PRIMARY OSTEOARTHRITIS OF BOTH KNEES: ICD-10-CM

## 2023-01-17 DIAGNOSIS — E86.0 DEHYDRATION: ICD-10-CM

## 2023-01-17 DIAGNOSIS — M81.0 OSTEOPOROSIS WITHOUT CURRENT PATHOLOGICAL FRACTURE, UNSPECIFIED OSTEOPOROSIS TYPE: ICD-10-CM

## 2023-01-17 DIAGNOSIS — I50.42 CHRONIC COMBINED SYSTOLIC AND DIASTOLIC CHF (CONGESTIVE HEART FAILURE): ICD-10-CM

## 2023-01-17 DIAGNOSIS — I10 HYPERTENSION, BENIGN: ICD-10-CM

## 2023-01-17 DIAGNOSIS — J44.9 CHRONIC OBSTRUCTIVE PULMONARY DISEASE, UNSPECIFIED COPD TYPE: ICD-10-CM

## 2023-01-17 DIAGNOSIS — E83.51 HYPOCALCEMIA: Primary | ICD-10-CM

## 2023-01-17 DIAGNOSIS — I95.9 HYPOTENSION, UNSPECIFIED HYPOTENSION TYPE: ICD-10-CM

## 2023-01-17 DIAGNOSIS — E87.8 ELECTROLYTE ABNORMALITY: ICD-10-CM

## 2023-01-17 LAB
ALBUMIN SERPL BCP-MCNC: 1.8 G/DL (ref 3.5–5.2)
ALBUMIN SERPL BCP-MCNC: 2.3 G/DL (ref 3.5–5.2)
ALP SERPL-CCNC: 54 U/L (ref 55–135)
ALP SERPL-CCNC: 70 U/L (ref 55–135)
ALT SERPL W/O P-5'-P-CCNC: 15 U/L (ref 10–44)
ALT SERPL W/O P-5'-P-CCNC: 19 U/L (ref 10–44)
ANION GAP SERPL CALC-SCNC: 13 MMOL/L (ref 8–16)
ANION GAP SERPL CALC-SCNC: 18 MMOL/L (ref 8–16)
ANISOCYTOSIS BLD QL SMEAR: SLIGHT
AST SERPL-CCNC: 24 U/L (ref 10–40)
AST SERPL-CCNC: 31 U/L (ref 10–40)
BASOPHILS # BLD AUTO: 0.01 K/UL (ref 0–0.2)
BASOPHILS NFR BLD: 0.1 % (ref 0–1.9)
BILIRUB SERPL-MCNC: 0.3 MG/DL (ref 0.1–1)
BILIRUB SERPL-MCNC: 0.4 MG/DL (ref 0.1–1)
BNP SERPL-MCNC: 139 PG/ML (ref 0–99)
BUN SERPL-MCNC: 19 MG/DL (ref 8–23)
BUN SERPL-MCNC: 20 MG/DL (ref 8–23)
CALCIUM SERPL-MCNC: 6.3 MG/DL (ref 8.7–10.5)
CALCIUM SERPL-MCNC: 6.8 MG/DL (ref 8.7–10.5)
CHLORIDE SERPL-SCNC: 96 MMOL/L (ref 95–110)
CHLORIDE SERPL-SCNC: 96 MMOL/L (ref 95–110)
CK SERPL-CCNC: 50 U/L (ref 20–180)
CO2 SERPL-SCNC: 13 MMOL/L (ref 23–29)
CO2 SERPL-SCNC: 17 MMOL/L (ref 23–29)
CREAT SERPL-MCNC: 1.3 MG/DL (ref 0.5–1.4)
CREAT SERPL-MCNC: 1.4 MG/DL (ref 0.5–1.4)
CTP QC/QA: YES
DIFFERENTIAL METHOD: ABNORMAL
EOSINOPHIL # BLD AUTO: 0 K/UL (ref 0–0.5)
EOSINOPHIL NFR BLD: 0 % (ref 0–8)
ERYTHROCYTE [DISTWIDTH] IN BLOOD BY AUTOMATED COUNT: 17.1 % (ref 11.5–14.5)
EST. GFR  (NO RACE VARIABLE): 40 ML/MIN/1.73 M^2
EST. GFR  (NO RACE VARIABLE): 43 ML/MIN/1.73 M^2
GLUCOSE SERPL-MCNC: 123 MG/DL (ref 70–110)
GLUCOSE SERPL-MCNC: 60 MG/DL (ref 70–110)
HCT VFR BLD AUTO: 25.4 % (ref 37–48.5)
HGB BLD-MCNC: 8.4 G/DL (ref 12–16)
IMM GRANULOCYTES # BLD AUTO: 0.05 K/UL (ref 0–0.04)
IMM GRANULOCYTES NFR BLD AUTO: 0.6 % (ref 0–0.5)
LACTATE SERPL-SCNC: 0.7 MMOL/L (ref 0.5–2.2)
LACTATE SERPL-SCNC: 2.3 MMOL/L (ref 0.5–2.2)
LYMPHOCYTES # BLD AUTO: 1.2 K/UL (ref 1–4.8)
LYMPHOCYTES NFR BLD: 13.6 % (ref 18–48)
MAGNESIUM SERPL-MCNC: 0.9 MG/DL (ref 1.6–2.6)
MCH RBC QN AUTO: 32.1 PG (ref 27–31)
MCHC RBC AUTO-ENTMCNC: 33.1 G/DL (ref 32–36)
MCV RBC AUTO: 97 FL (ref 82–98)
MONOCYTES # BLD AUTO: 2 K/UL (ref 0.3–1)
MONOCYTES NFR BLD: 22.4 % (ref 4–15)
NEUTROPHILS # BLD AUTO: 5.7 K/UL (ref 1.8–7.7)
NEUTROPHILS NFR BLD: 63.3 % (ref 38–73)
NRBC BLD-RTO: 0 /100 WBC
OVALOCYTES BLD QL SMEAR: ABNORMAL
PHOSPHATE SERPL-MCNC: 1.2 MG/DL (ref 2.7–4.5)
PLATELET # BLD AUTO: 218 K/UL (ref 150–450)
PLATELET BLD QL SMEAR: ABNORMAL
PMV BLD AUTO: 9.8 FL (ref 9.2–12.9)
POCT GLUCOSE: 146 MG/DL (ref 70–110)
POIKILOCYTOSIS BLD QL SMEAR: SLIGHT
POTASSIUM SERPL-SCNC: 3.2 MMOL/L (ref 3.5–5.1)
POTASSIUM SERPL-SCNC: 3.6 MMOL/L (ref 3.5–5.1)
PROT SERPL-MCNC: 4.3 G/DL (ref 6–8.4)
PROT SERPL-MCNC: 5.6 G/DL (ref 6–8.4)
RBC # BLD AUTO: 2.62 M/UL (ref 4–5.4)
SARS-COV-2 RDRP RESP QL NAA+PROBE: NEGATIVE
SODIUM SERPL-SCNC: 126 MMOL/L (ref 136–145)
SODIUM SERPL-SCNC: 127 MMOL/L (ref 136–145)
T4 FREE SERPL-MCNC: 1.01 NG/DL (ref 0.71–1.51)
TOXIC GRANULES BLD QL SMEAR: PRESENT
TSH SERPL DL<=0.005 MIU/L-ACNC: 0.35 UIU/ML (ref 0.4–4)
URATE SERPL-MCNC: 13.2 MG/DL (ref 2.4–5.7)
WBC # BLD AUTO: 9 K/UL (ref 3.9–12.7)
WBC TOXIC VACUOLES BLD QL SMEAR: PRESENT

## 2023-01-17 PROCEDURE — 27000221 HC OXYGEN, UP TO 24 HOURS

## 2023-01-17 PROCEDURE — 84443 ASSAY THYROID STIM HORMONE: CPT

## 2023-01-17 PROCEDURE — 93005 ELECTROCARDIOGRAM TRACING: CPT

## 2023-01-17 PROCEDURE — 83735 ASSAY OF MAGNESIUM: CPT | Performed by: EMERGENCY MEDICINE

## 2023-01-17 PROCEDURE — 63600175 PHARM REV CODE 636 W HCPCS: Performed by: EMERGENCY MEDICINE

## 2023-01-17 PROCEDURE — 93010 ELECTROCARDIOGRAM REPORT: CPT | Mod: ,,, | Performed by: INTERNAL MEDICINE

## 2023-01-17 PROCEDURE — 85025 COMPLETE CBC W/AUTO DIFF WBC: CPT | Performed by: EMERGENCY MEDICINE

## 2023-01-17 PROCEDURE — 25000003 PHARM REV CODE 250: Performed by: EMERGENCY MEDICINE

## 2023-01-17 PROCEDURE — 84550 ASSAY OF BLOOD/URIC ACID: CPT

## 2023-01-17 PROCEDURE — 96368 THER/DIAG CONCURRENT INF: CPT

## 2023-01-17 PROCEDURE — 80053 COMPREHEN METABOLIC PANEL: CPT

## 2023-01-17 PROCEDURE — 80053 COMPREHEN METABOLIC PANEL: CPT | Mod: 91 | Performed by: EMERGENCY MEDICINE

## 2023-01-17 PROCEDURE — 12000002 HC ACUTE/MED SURGE SEMI-PRIVATE ROOM

## 2023-01-17 PROCEDURE — 96367 TX/PROPH/DG ADDL SEQ IV INF: CPT

## 2023-01-17 PROCEDURE — 87040 BLOOD CULTURE FOR BACTERIA: CPT | Mod: 59 | Performed by: EMERGENCY MEDICINE

## 2023-01-17 PROCEDURE — 96366 THER/PROPH/DIAG IV INF ADDON: CPT

## 2023-01-17 PROCEDURE — 94761 N-INVAS EAR/PLS OXIMETRY MLT: CPT

## 2023-01-17 PROCEDURE — 83605 ASSAY OF LACTIC ACID: CPT | Performed by: EMERGENCY MEDICINE

## 2023-01-17 PROCEDURE — 99291 CRITICAL CARE FIRST HOUR: CPT | Mod: 25

## 2023-01-17 PROCEDURE — 84439 ASSAY OF FREE THYROXINE: CPT | Performed by: EMERGENCY MEDICINE

## 2023-01-17 PROCEDURE — 93010 EKG 12-LEAD: ICD-10-PCS | Mod: ,,, | Performed by: INTERNAL MEDICINE

## 2023-01-17 PROCEDURE — 96365 THER/PROPH/DIAG IV INF INIT: CPT

## 2023-01-17 PROCEDURE — 84100 ASSAY OF PHOSPHORUS: CPT | Performed by: EMERGENCY MEDICINE

## 2023-01-17 PROCEDURE — 96361 HYDRATE IV INFUSION ADD-ON: CPT

## 2023-01-17 PROCEDURE — 82550 ASSAY OF CK (CPK): CPT | Performed by: EMERGENCY MEDICINE

## 2023-01-17 PROCEDURE — 87635 SARS-COV-2 COVID-19 AMP PRB: CPT | Performed by: EMERGENCY MEDICINE

## 2023-01-17 PROCEDURE — 99223 1ST HOSP IP/OBS HIGH 75: CPT | Mod: ,,,

## 2023-01-17 PROCEDURE — 83880 ASSAY OF NATRIURETIC PEPTIDE: CPT

## 2023-01-17 PROCEDURE — 27000207 HC ISOLATION

## 2023-01-17 PROCEDURE — 99223 PR INITIAL HOSPITAL CARE,LEVL III: ICD-10-PCS | Mod: ,,,

## 2023-01-17 RX ORDER — MAG HYDROX/ALUMINUM HYD/SIMETH 200-200-20
30 SUSPENSION, ORAL (FINAL DOSE FORM) ORAL 4 TIMES DAILY PRN
Status: DISCONTINUED | OUTPATIENT
Start: 2023-01-17 | End: 2023-01-24 | Stop reason: HOSPADM

## 2023-01-17 RX ORDER — MAGNESIUM SULFATE HEPTAHYDRATE 40 MG/ML
2 INJECTION, SOLUTION INTRAVENOUS
Status: COMPLETED | OUTPATIENT
Start: 2023-01-17 | End: 2023-01-17

## 2023-01-17 RX ORDER — SIMETHICONE 80 MG
1 TABLET,CHEWABLE ORAL 4 TIMES DAILY PRN
Status: DISCONTINUED | OUTPATIENT
Start: 2023-01-17 | End: 2023-01-24 | Stop reason: HOSPADM

## 2023-01-17 RX ORDER — PROCHLORPERAZINE EDISYLATE 5 MG/ML
5 INJECTION INTRAMUSCULAR; INTRAVENOUS EVERY 6 HOURS PRN
Status: DISCONTINUED | OUTPATIENT
Start: 2023-01-17 | End: 2023-01-24 | Stop reason: HOSPADM

## 2023-01-17 RX ORDER — CALCIUM GLUCONATE 20 MG/ML
1 INJECTION, SOLUTION INTRAVENOUS
Status: COMPLETED | OUTPATIENT
Start: 2023-01-17 | End: 2023-01-17

## 2023-01-17 RX ORDER — HYDROCODONE BITARTRATE AND ACETAMINOPHEN 10; 325 MG/1; MG/1
1 TABLET ORAL EVERY 12 HOURS PRN
Qty: 60 TABLET | Refills: 0 | Status: ON HOLD | OUTPATIENT
Start: 2023-01-17 | End: 2023-02-20 | Stop reason: SDUPTHER

## 2023-01-17 RX ORDER — SODIUM CHLORIDE 0.9 % (FLUSH) 0.9 %
10 SYRINGE (ML) INJECTION EVERY 12 HOURS PRN
Status: DISCONTINUED | OUTPATIENT
Start: 2023-01-17 | End: 2023-01-24 | Stop reason: HOSPADM

## 2023-01-17 RX ORDER — ACETAMINOPHEN 500 MG
1000 TABLET ORAL EVERY 6 HOURS PRN
Status: DISCONTINUED | OUTPATIENT
Start: 2023-01-17 | End: 2023-01-24 | Stop reason: HOSPADM

## 2023-01-17 RX ORDER — ENOXAPARIN SODIUM 100 MG/ML
40 INJECTION SUBCUTANEOUS EVERY 24 HOURS
Status: DISCONTINUED | OUTPATIENT
Start: 2023-01-17 | End: 2023-01-24 | Stop reason: HOSPADM

## 2023-01-17 RX ORDER — ONDANSETRON 2 MG/ML
4 INJECTION INTRAMUSCULAR; INTRAVENOUS EVERY 8 HOURS PRN
Status: DISCONTINUED | OUTPATIENT
Start: 2023-01-17 | End: 2023-01-24 | Stop reason: HOSPADM

## 2023-01-17 RX ADMIN — CALCIUM GLUCONATE 1 G: 20 INJECTION, SOLUTION INTRAVENOUS at 07:01

## 2023-01-17 RX ADMIN — SODIUM CHLORIDE 500 ML: 0.9 INJECTION, SOLUTION INTRAVENOUS at 06:01

## 2023-01-17 RX ADMIN — MAGNESIUM SULFATE HEPTAHYDRATE 2 G: 40 INJECTION, SOLUTION INTRAVENOUS at 07:01

## 2023-01-17 RX ADMIN — DEXTROSE 500 ML: 10 SOLUTION INTRAVENOUS at 09:01

## 2023-01-17 RX ADMIN — SODIUM CHLORIDE 1000 ML: 0.9 INJECTION, SOLUTION INTRAVENOUS at 08:01

## 2023-01-17 NOTE — TELEPHONE ENCOUNTER
Patient requesting refill on HYDROcodone-acetaminophen (NORCO)  mg per tablet .    According to [facility] patient last office visit with an Internal Medicine provider was on 09/21/22 and the last refill date for the requested medication was on 12/15/22.

## 2023-01-17 NOTE — TELEPHONE ENCOUNTER
Care Due:                  Date            Visit Type   Department     Provider  --------------------------------------------------------------------------------                                EP -                              PRIMARY      [facility]  Last Visit: 09-      CARE ([facility])   MEDICINE       Yane Sahni                              EP -                              PRIMARY      [facility]  Next Visit: 01-      CARE ([facility])   MEDICINE       Yane Sahni                                                            Last  Test          Frequency    Reason                     Performed    Due Date  --------------------------------------------------------------------------------    Lipid Panel.  12 months..  atorvastatin.............  02-   02-    [employer] Embedded Care Gaps. Reference number: [id number]. 1/17/2023   8:16:51 AM CST

## 2023-01-17 NOTE — Clinical Note
Diagnosis: Electrolyte abnormality [775672]   Admitting Provider:: TAL MARTINEZ [[id number]]   Future Attending Provider: TAL MARTINEZ [[id number]]   Reason for IP Medical Treatment  (Clinical interventions that can only be accomplished in the IP setting? ) :: dehydration, hypomagnesemia, hypocalcemia   Estimated Length of Stay:: 2 midnights   I certify that Inpatient services for greater than or equal to 2 midnights are medically necessary:: Yes   Plans for Post-Acute care--if anticipated (pick the single best option):: C. Discharge home with home health services

## 2023-01-17 NOTE — PROGRESS NOTES
SW received call from patient. She was unable to go in for treatment due to diarrhea but has now decided she needs to go to the ER. She did not want to go via ambulance. SW arranged [employer] transportation for 4p as requested by patient. MAURY also sent urgent message to Dr. Gilmore and staff to look into a direct admit.

## 2023-01-18 PROBLEM — J96.22 ACUTE ON CHRONIC RESPIRATORY FAILURE WITH HYPOXIA AND HYPERCAPNIA: Status: ACTIVE | Noted: 2021-05-13

## 2023-01-18 PROBLEM — J90 PLEURAL EFFUSION: Status: ACTIVE | Noted: 2023-01-18

## 2023-01-18 PROBLEM — J96.21 ACUTE ON CHRONIC RESPIRATORY FAILURE WITH HYPOXIA AND HYPERCAPNIA: Status: ACTIVE | Noted: 2021-05-13

## 2023-01-18 PROBLEM — E87.8 ELECTROLYTE ABNORMALITY: Status: ACTIVE | Noted: 2023-01-18

## 2023-01-18 PROBLEM — E86.1 HYPOVOLEMIA DEHYDRATION: Status: ACTIVE | Noted: 2023-01-18

## 2023-01-18 PROBLEM — N18.30 ACUTE RENAL FAILURE SUPERIMPOSED ON STAGE 3 CHRONIC KIDNEY DISEASE: Status: ACTIVE | Noted: 2022-12-23

## 2023-01-18 LAB
ALBUMIN SERPL BCP-MCNC: 2 G/DL (ref 3.5–5.2)
ALBUMIN SERPL BCP-MCNC: 2.1 G/DL (ref 3.5–5.2)
ALP SERPL-CCNC: 63 U/L (ref 55–135)
ALT SERPL W/O P-5'-P-CCNC: 14 U/L (ref 10–44)
ANION GAP SERPL CALC-SCNC: 11 MMOL/L (ref 8–16)
ANION GAP SERPL CALC-SCNC: 7 MMOL/L (ref 8–16)
ANION GAP SERPL CALC-SCNC: 8 MMOL/L (ref 8–16)
ANION GAP SERPL CALC-SCNC: 9 MMOL/L (ref 8–16)
AST SERPL-CCNC: 25 U/L (ref 10–40)
BACTERIA #/AREA URNS HPF: ABNORMAL /HPF
BASOPHILS # BLD AUTO: 0.01 K/UL (ref 0–0.2)
BASOPHILS NFR BLD: 0.1 % (ref 0–1.9)
BILIRUB SERPL-MCNC: 0.4 MG/DL (ref 0.1–1)
BILIRUB UR QL STRIP: NEGATIVE
BUN SERPL-MCNC: 20 MG/DL (ref 8–23)
BUN SERPL-MCNC: 22 MG/DL (ref 8–23)
BUN SERPL-MCNC: 22 MG/DL (ref 8–23)
BUN SERPL-MCNC: 23 MG/DL (ref 8–23)
CA-I BLDV-SCNC: 0.87 MMOL/L (ref 1.06–1.42)
CA-I BLDV-SCNC: 0.98 MMOL/L (ref 1.06–1.42)
CALCIUM SERPL-MCNC: 6.1 MG/DL (ref 8.7–10.5)
CALCIUM SERPL-MCNC: 6.7 MG/DL (ref 8.7–10.5)
CALCIUM SERPL-MCNC: 6.9 MG/DL (ref 8.7–10.5)
CALCIUM SERPL-MCNC: 7 MG/DL (ref 8.7–10.5)
CHLORIDE SERPL-SCNC: 101 MMOL/L (ref 95–110)
CHLORIDE SERPL-SCNC: 98 MMOL/L (ref 95–110)
CHLORIDE SERPL-SCNC: 98 MMOL/L (ref 95–110)
CHLORIDE SERPL-SCNC: 99 MMOL/L (ref 95–110)
CLARITY UR: ABNORMAL
CO2 SERPL-SCNC: 18 MMOL/L (ref 23–29)
CO2 SERPL-SCNC: 20 MMOL/L (ref 23–29)
CO2 SERPL-SCNC: 21 MMOL/L (ref 23–29)
CO2 SERPL-SCNC: 21 MMOL/L (ref 23–29)
COLOR UR: YELLOW
CREAT SERPL-MCNC: 1.2 MG/DL (ref 0.5–1.4)
CREAT SERPL-MCNC: 1.2 MG/DL (ref 0.5–1.4)
CREAT SERPL-MCNC: 1.4 MG/DL (ref 0.5–1.4)
CREAT SERPL-MCNC: 1.5 MG/DL (ref 0.5–1.4)
CREAT UR-MCNC: 88.9 MG/DL (ref 15–325)
DIFFERENTIAL METHOD: ABNORMAL
EOSINOPHIL # BLD AUTO: 0 K/UL (ref 0–0.5)
EOSINOPHIL NFR BLD: 0.1 % (ref 0–8)
ERYTHROCYTE [DISTWIDTH] IN BLOOD BY AUTOMATED COUNT: 17.2 % (ref 11.5–14.5)
EST. GFR  (NO RACE VARIABLE): 37 ML/MIN/1.73 M^2
EST. GFR  (NO RACE VARIABLE): 40 ML/MIN/1.73 M^2
EST. GFR  (NO RACE VARIABLE): 48 ML/MIN/1.73 M^2
EST. GFR  (NO RACE VARIABLE): 48 ML/MIN/1.73 M^2
GLUCOSE SERPL-MCNC: 125 MG/DL (ref 70–110)
GLUCOSE SERPL-MCNC: 79 MG/DL (ref 70–110)
GLUCOSE SERPL-MCNC: 81 MG/DL (ref 70–110)
GLUCOSE SERPL-MCNC: 97 MG/DL (ref 70–110)
GLUCOSE UR QL STRIP: NEGATIVE
HCT VFR BLD AUTO: 21.3 % (ref 37–48.5)
HGB BLD-MCNC: 7.3 G/DL (ref 12–16)
HGB UR QL STRIP: ABNORMAL
HYALINE CASTS #/AREA URNS LPF: 0 /LPF
IMM GRANULOCYTES # BLD AUTO: 0.05 K/UL (ref 0–0.04)
IMM GRANULOCYTES NFR BLD AUTO: 0.6 % (ref 0–0.5)
KETONES UR QL STRIP: ABNORMAL
LEUKOCYTE ESTERASE UR QL STRIP: ABNORMAL
LYMPHOCYTES # BLD AUTO: 1.2 K/UL (ref 1–4.8)
LYMPHOCYTES NFR BLD: 14.3 % (ref 18–48)
MAGNESIUM SERPL-MCNC: 1.4 MG/DL (ref 1.6–2.6)
MAGNESIUM SERPL-MCNC: 1.6 MG/DL (ref 1.6–2.6)
MAGNESIUM SERPL-MCNC: 1.8 MG/DL (ref 1.6–2.6)
MAGNESIUM SERPL-MCNC: 1.8 MG/DL (ref 1.6–2.6)
MCH RBC QN AUTO: 32.7 PG (ref 27–31)
MCHC RBC AUTO-ENTMCNC: 34.3 G/DL (ref 32–36)
MCV RBC AUTO: 96 FL (ref 82–98)
MICROSCOPIC COMMENT: ABNORMAL
MONOCYTES # BLD AUTO: 1.6 K/UL (ref 0.3–1)
MONOCYTES NFR BLD: 19.7 % (ref 4–15)
NEUTROPHILS # BLD AUTO: 5.2 K/UL (ref 1.8–7.7)
NEUTROPHILS NFR BLD: 65.2 % (ref 38–73)
NITRITE UR QL STRIP: POSITIVE
NON-SQ EPI CELLS #/AREA URNS HPF: 2 /HPF
NRBC BLD-RTO: 0 /100 WBC
OSMOLALITY UR: 234 MOSM/KG (ref 50–1200)
PH UR STRIP: 6 [PH] (ref 5–8)
PHOSPHATE SERPL-MCNC: 1.4 MG/DL (ref 2.7–4.5)
PHOSPHATE SERPL-MCNC: 2.1 MG/DL (ref 2.7–4.5)
PLATELET # BLD AUTO: 192 K/UL (ref 150–450)
PMV BLD AUTO: 10 FL (ref 9.2–12.9)
POTASSIUM SERPL-SCNC: 3.3 MMOL/L (ref 3.5–5.1)
POTASSIUM SERPL-SCNC: 3.7 MMOL/L (ref 3.5–5.1)
POTASSIUM SERPL-SCNC: 4.1 MMOL/L (ref 3.5–5.1)
POTASSIUM SERPL-SCNC: 5 MMOL/L (ref 3.5–5.1)
PROT SERPL-MCNC: 4.7 G/DL (ref 6–8.4)
PROT UR QL STRIP: ABNORMAL
RBC # BLD AUTO: 2.23 M/UL (ref 4–5.4)
RBC #/AREA URNS HPF: 44 /HPF (ref 0–4)
SODIUM SERPL-SCNC: 127 MMOL/L (ref 136–145)
SODIUM SERPL-SCNC: 127 MMOL/L (ref 136–145)
SODIUM SERPL-SCNC: 128 MMOL/L (ref 136–145)
SODIUM SERPL-SCNC: 129 MMOL/L (ref 136–145)
SODIUM UR-SCNC: 34 MMOL/L (ref 20–250)
SP GR UR STRIP: 1.01 (ref 1–1.03)
SQUAMOUS #/AREA URNS HPF: 15 /HPF
URATE UR-MCNC: 66.4 MG/DL
URN SPEC COLLECT METH UR: ABNORMAL
UROBILINOGEN UR STRIP-ACNC: NEGATIVE EU/DL
WBC # BLD AUTO: 8.04 K/UL (ref 3.9–12.7)
WBC #/AREA URNS HPF: >100 /HPF (ref 0–5)
WBC CLUMPS URNS QL MICRO: ABNORMAL

## 2023-01-18 PROCEDURE — 99233 PR SUBSEQUENT HOSPITAL CARE,LEVL III: ICD-10-PCS | Mod: ,,, | Performed by: INTERNAL MEDICINE

## 2023-01-18 PROCEDURE — 99900035 HC TECH TIME PER 15 MIN (STAT)

## 2023-01-18 PROCEDURE — 99233 SBSQ HOSP IP/OBS HIGH 50: CPT | Mod: ,,, | Performed by: INTERNAL MEDICINE

## 2023-01-18 PROCEDURE — 63600175 PHARM REV CODE 636 W HCPCS: Performed by: INTERNAL MEDICINE

## 2023-01-18 PROCEDURE — 25000003 PHARM REV CODE 250

## 2023-01-18 PROCEDURE — 27000207 HC ISOLATION

## 2023-01-18 PROCEDURE — 80053 COMPREHEN METABOLIC PANEL: CPT

## 2023-01-18 PROCEDURE — 25000242 PHARM REV CODE 250 ALT 637 W/ HCPCS

## 2023-01-18 PROCEDURE — 87186 SC STD MICRODIL/AGAR DIL: CPT

## 2023-01-18 PROCEDURE — 82330 ASSAY OF CALCIUM: CPT | Mod: 91 | Performed by: INTERNAL MEDICINE

## 2023-01-18 PROCEDURE — 87077 CULTURE AEROBIC IDENTIFY: CPT

## 2023-01-18 PROCEDURE — 63600175 PHARM REV CODE 636 W HCPCS

## 2023-01-18 PROCEDURE — 27000221 HC OXYGEN, UP TO 24 HOURS

## 2023-01-18 PROCEDURE — 80069 RENAL FUNCTION PANEL: CPT | Performed by: INTERNAL MEDICINE

## 2023-01-18 PROCEDURE — 25000003 PHARM REV CODE 250: Performed by: INTERNAL MEDICINE

## 2023-01-18 PROCEDURE — 36415 COLL VENOUS BLD VENIPUNCTURE: CPT | Performed by: INTERNAL MEDICINE

## 2023-01-18 PROCEDURE — 36415 COLL VENOUS BLD VENIPUNCTURE: CPT

## 2023-01-18 PROCEDURE — 84300 ASSAY OF URINE SODIUM: CPT

## 2023-01-18 PROCEDURE — 82330 ASSAY OF CALCIUM: CPT | Performed by: INTERNAL MEDICINE

## 2023-01-18 PROCEDURE — 83735 ASSAY OF MAGNESIUM: CPT | Mod: 91 | Performed by: INTERNAL MEDICINE

## 2023-01-18 PROCEDURE — 87088 URINE BACTERIA CULTURE: CPT

## 2023-01-18 PROCEDURE — 21400001 HC TELEMETRY ROOM

## 2023-01-18 PROCEDURE — 84560 ASSAY OF URINE/URIC ACID: CPT

## 2023-01-18 PROCEDURE — 94761 N-INVAS EAR/PLS OXIMETRY MLT: CPT

## 2023-01-18 PROCEDURE — 82570 ASSAY OF URINE CREATININE: CPT

## 2023-01-18 PROCEDURE — 83735 ASSAY OF MAGNESIUM: CPT | Mod: 91

## 2023-01-18 PROCEDURE — 99223 1ST HOSP IP/OBS HIGH 75: CPT | Mod: GC,,, | Performed by: INTERNAL MEDICINE

## 2023-01-18 PROCEDURE — 85025 COMPLETE CBC W/AUTO DIFF WBC: CPT

## 2023-01-18 PROCEDURE — 94640 AIRWAY INHALATION TREATMENT: CPT

## 2023-01-18 PROCEDURE — 84100 ASSAY OF PHOSPHORUS: CPT

## 2023-01-18 PROCEDURE — 99223 PR INITIAL HOSPITAL CARE,LEVL III: ICD-10-PCS | Mod: GC,,, | Performed by: INTERNAL MEDICINE

## 2023-01-18 PROCEDURE — 25000003 PHARM REV CODE 250: Mod: TB,JG | Performed by: INTERNAL MEDICINE

## 2023-01-18 PROCEDURE — 83935 ASSAY OF URINE OSMOLALITY: CPT

## 2023-01-18 PROCEDURE — 80048 BASIC METABOLIC PNL TOTAL CA: CPT | Mod: 91,XB

## 2023-01-18 PROCEDURE — 87086 URINE CULTURE/COLONY COUNT: CPT

## 2023-01-18 PROCEDURE — 81000 URINALYSIS NONAUTO W/SCOPE: CPT

## 2023-01-18 RX ORDER — GABAPENTIN 300 MG/1
300 CAPSULE ORAL 3 TIMES DAILY
Status: DISCONTINUED | OUTPATIENT
Start: 2023-01-18 | End: 2023-01-24 | Stop reason: HOSPADM

## 2023-01-18 RX ORDER — TRAZODONE HYDROCHLORIDE 100 MG/1
100 TABLET ORAL NIGHTLY
Status: DISCONTINUED | OUTPATIENT
Start: 2023-01-18 | End: 2023-01-24 | Stop reason: HOSPADM

## 2023-01-18 RX ORDER — HYDROCODONE BITARTRATE AND ACETAMINOPHEN 10; 325 MG/1; MG/1
1 TABLET ORAL EVERY 12 HOURS PRN
Status: DISCONTINUED | OUTPATIENT
Start: 2023-01-18 | End: 2023-01-24 | Stop reason: HOSPADM

## 2023-01-18 RX ORDER — DULOXETIN HYDROCHLORIDE 30 MG/1
60 CAPSULE, DELAYED RELEASE ORAL DAILY
Status: DISCONTINUED | OUTPATIENT
Start: 2023-01-18 | End: 2023-01-24 | Stop reason: HOSPADM

## 2023-01-18 RX ORDER — MAGNESIUM SULFATE HEPTAHYDRATE 40 MG/ML
2 INJECTION, SOLUTION INTRAVENOUS ONCE
Status: COMPLETED | OUTPATIENT
Start: 2023-01-18 | End: 2023-01-18

## 2023-01-18 RX ORDER — CALCIUM GLUCONATE 20 MG/ML
1 INJECTION, SOLUTION INTRAVENOUS
Status: COMPLETED | OUTPATIENT
Start: 2023-01-18 | End: 2023-01-19

## 2023-01-18 RX ORDER — IPRATROPIUM BROMIDE AND ALBUTEROL SULFATE 2.5; .5 MG/3ML; MG/3ML
3 SOLUTION RESPIRATORY (INHALATION)
Status: DISCONTINUED | OUTPATIENT
Start: 2023-01-18 | End: 2023-01-24 | Stop reason: HOSPADM

## 2023-01-18 RX ORDER — AZELASTINE 1 MG/ML
1 SPRAY, METERED NASAL 2 TIMES DAILY
Status: DISCONTINUED | OUTPATIENT
Start: 2023-01-18 | End: 2023-01-24 | Stop reason: HOSPADM

## 2023-01-18 RX ORDER — CHOLECALCIFEROL (VITAMIN D3) 25 MCG
1000 TABLET ORAL DAILY
Status: DISCONTINUED | OUTPATIENT
Start: 2023-01-18 | End: 2023-01-24 | Stop reason: HOSPADM

## 2023-01-18 RX ORDER — SODIUM BICARBONATE 650 MG/1
650 TABLET ORAL 2 TIMES DAILY
Status: DISCONTINUED | OUTPATIENT
Start: 2023-01-18 | End: 2023-01-21

## 2023-01-18 RX ORDER — GUAIFENESIN 600 MG/1
1200 TABLET, EXTENDED RELEASE ORAL 2 TIMES DAILY PRN
Status: DISCONTINUED | OUTPATIENT
Start: 2023-01-18 | End: 2023-01-21

## 2023-01-18 RX ORDER — ALPRAZOLAM 0.25 MG/1
0.25 TABLET ORAL DAILY PRN
Status: DISCONTINUED | OUTPATIENT
Start: 2023-01-18 | End: 2023-01-24 | Stop reason: HOSPADM

## 2023-01-18 RX ORDER — SODIUM CHLORIDE 9 MG/ML
INJECTION, SOLUTION INTRAVENOUS CONTINUOUS
Status: DISCONTINUED | OUTPATIENT
Start: 2023-01-18 | End: 2023-01-21

## 2023-01-18 RX ORDER — CALCIUM GLUCONATE 98 MG/ML
2 INJECTION, SOLUTION INTRAVENOUS ONCE
Status: DISCONTINUED | OUTPATIENT
Start: 2023-01-18 | End: 2023-01-18

## 2023-01-18 RX ORDER — ASCORBIC ACID 250 MG
250 TABLET ORAL DAILY
Status: DISCONTINUED | OUTPATIENT
Start: 2023-01-18 | End: 2023-01-24 | Stop reason: HOSPADM

## 2023-01-18 RX ORDER — CALCIUM GLUCONATE 98 MG/ML
1 INJECTION, SOLUTION INTRAVENOUS ONCE
Status: COMPLETED | OUTPATIENT
Start: 2023-01-18 | End: 2023-01-18

## 2023-01-18 RX ORDER — PANTOPRAZOLE SODIUM 40 MG/1
40 TABLET, DELAYED RELEASE ORAL DAILY
Status: DISCONTINUED | OUTPATIENT
Start: 2023-01-18 | End: 2023-01-24 | Stop reason: HOSPADM

## 2023-01-18 RX ORDER — ATORVASTATIN CALCIUM 20 MG/1
40 TABLET, FILM COATED ORAL DAILY
Status: DISCONTINUED | OUTPATIENT
Start: 2023-01-18 | End: 2023-01-24 | Stop reason: HOSPADM

## 2023-01-18 RX ORDER — TORSEMIDE 20 MG/1
20 TABLET ORAL DAILY
Status: DISCONTINUED | OUTPATIENT
Start: 2023-01-18 | End: 2023-01-18

## 2023-01-18 RX ORDER — METOPROLOL SUCCINATE 50 MG/1
50 TABLET, EXTENDED RELEASE ORAL DAILY
Status: DISCONTINUED | OUTPATIENT
Start: 2023-01-18 | End: 2023-01-24 | Stop reason: HOSPADM

## 2023-01-18 RX ORDER — POTASSIUM CHLORIDE 20 MEQ/1
20 TABLET, EXTENDED RELEASE ORAL 3 TIMES DAILY
Status: DISCONTINUED | OUTPATIENT
Start: 2023-01-18 | End: 2023-01-21

## 2023-01-18 RX ADMIN — POTASSIUM CHLORIDE 20 MEQ: 1500 TABLET, EXTENDED RELEASE ORAL at 10:01

## 2023-01-18 RX ADMIN — ENOXAPARIN SODIUM 40 MG: 40 INJECTION SUBCUTANEOUS at 05:01

## 2023-01-18 RX ADMIN — Medication 250 MG: at 08:01

## 2023-01-18 RX ADMIN — DULOXETINE 60 MG: 30 CAPSULE, DELAYED RELEASE ORAL at 08:01

## 2023-01-18 RX ADMIN — SODIUM CHLORIDE: 9 INJECTION, SOLUTION INTRAVENOUS at 03:01

## 2023-01-18 RX ADMIN — TRAZODONE HYDROCHLORIDE 100 MG: 100 TABLET ORAL at 10:01

## 2023-01-18 RX ADMIN — SODIUM BICARBONATE 650 MG TABLET 650 MG: at 10:01

## 2023-01-18 RX ADMIN — METOPROLOL SUCCINATE 50 MG: 50 TABLET, EXTENDED RELEASE ORAL at 08:01

## 2023-01-18 RX ADMIN — Medication 1000 UNITS: at 08:01

## 2023-01-18 RX ADMIN — IPRATROPIUM BROMIDE AND ALBUTEROL SULFATE 3 ML: 2.5; .5 SOLUTION RESPIRATORY (INHALATION) at 08:01

## 2023-01-18 RX ADMIN — CEFTRIAXONE 1 G: 1 INJECTION, POWDER, FOR SOLUTION INTRAMUSCULAR; INTRAVENOUS at 04:01

## 2023-01-18 RX ADMIN — IPRATROPIUM BROMIDE AND ALBUTEROL SULFATE 3 ML: 2.5; .5 SOLUTION RESPIRATORY (INHALATION) at 11:01

## 2023-01-18 RX ADMIN — GABAPENTIN 300 MG: 300 CAPSULE ORAL at 10:01

## 2023-01-18 RX ADMIN — PANTOPRAZOLE SODIUM 40 MG: 40 TABLET, DELAYED RELEASE ORAL at 08:01

## 2023-01-18 RX ADMIN — HYDROCODONE BITARTRATE AND ACETAMINOPHEN 1 TABLET: 10; 325 TABLET ORAL at 10:01

## 2023-01-18 RX ADMIN — IPRATROPIUM BROMIDE AND ALBUTEROL SULFATE 3 ML: 2.5; .5 SOLUTION RESPIRATORY (INHALATION) at 05:01

## 2023-01-18 RX ADMIN — CALCIUM GLUCONATE 1 G: 98 INJECTION, SOLUTION INTRAVENOUS at 08:01

## 2023-01-18 RX ADMIN — ENOXAPARIN SODIUM 40 MG: 40 INJECTION SUBCUTANEOUS at 12:01

## 2023-01-18 RX ADMIN — COLLAGENASE SANTYL: 250 OINTMENT TOPICAL at 02:01

## 2023-01-18 RX ADMIN — ACETAMINOPHEN 1000 MG: 500 TABLET, FILM COATED ORAL at 02:01

## 2023-01-18 RX ADMIN — CALCIUM GLUCONATE 1 G: 20 INJECTION, SOLUTION INTRAVENOUS at 10:01

## 2023-01-18 RX ADMIN — GABAPENTIN 300 MG: 300 CAPSULE ORAL at 08:01

## 2023-01-18 RX ADMIN — POTASSIUM CHLORIDE 20 MEQ: 1500 TABLET, EXTENDED RELEASE ORAL at 02:01

## 2023-01-18 RX ADMIN — POTASSIUM CHLORIDE 20 MEQ: 1500 TABLET, EXTENDED RELEASE ORAL at 08:01

## 2023-01-18 RX ADMIN — ATORVASTATIN CALCIUM 40 MG: 20 TABLET, FILM COATED ORAL at 08:01

## 2023-01-18 RX ADMIN — POTASSIUM CHLORIDE 20 MEQ: 1500 TABLET, EXTENDED RELEASE ORAL at 04:01

## 2023-01-18 RX ADMIN — AZELASTINE HYDROCHLORIDE 137 MCG: 137 SPRAY, METERED NASAL at 08:01

## 2023-01-18 RX ADMIN — MAGNESIUM SULFATE HEPTAHYDRATE 2 G: 40 INJECTION, SOLUTION INTRAVENOUS at 05:01

## 2023-01-18 RX ADMIN — GABAPENTIN 300 MG: 300 CAPSULE ORAL at 02:01

## 2023-01-18 RX ADMIN — AZELASTINE HYDROCHLORIDE 137 MCG: 137 SPRAY, METERED NASAL at 10:01

## 2023-01-18 NOTE — ASSESSMENT & PLAN NOTE
Patient is currently on chemo (last on 1/6/23) and radiation  Reports missing radiation the whole of last week

## 2023-01-18 NOTE — ED PROVIDER NOTES
Encounter Date: 1/17/2023    SCRIBE #1 NOTE: I, Tam Bauman, am scribing for, and in the presence of,  Tam Jones MD.     History     Chief Complaint   Patient presents with    Diarrhea     Patient presented to ER with c/o diarrhea x 6 days and an increase in SOB x 2 days. Patient on O2 at 4L NC. Stated her blood pressure has been low all day. Patient stated she is also receiving chemo and radiation for a swollen neck lymph nodes. Patient has several complaints. Lives alone.      Time seen by provider: 6:09 PM    This is a 72 y.o. female with PMHx of CHF who presents with complaint of diarrhea and SOB onset 6 days ago. Associated symptoms include abdominal pain and vomiting. Pt states that she was able to eat a cup of tapioca and drink Gatorade, but the Gatorade made her nauseous so she stopped drinking it. She also reports low blood pressure, which she measured as 103/69. Pt is currently prescribed and takes anxiety and depression medication as well as a diuretic for CHF. She states that she was recently told by her Oncologist to take imodium, but she stopped taking it for a day and this is when she reports the diarrhea began. However, she denies any other medication changes. Pt also lives alone and uses a wheelchair. This is the extent of the patient's complaints at this time.    The history is provided by the patient.   Review of patient's allergies indicates:   Allergen Reactions    Wellbutrin [bupropion hcl] Other (See Comments)     Hyponatremia and hypomagnesia     Zoloft [sertraline] Other (See Comments)     Hyponatremia and hypomagnesia     Bananas [banana]      Emesis, and stomach cramps    Patient states she is not allergic to Banana     Past Medical History:   Diagnosis Date    Anxiety     Cervical spinal stenosis     Choledocholithiasis     Chronic obstructive pulmonary disease 03/16/2016    Degenerative disc disease of cervical spine     Diverticulosis 04/24/2018    History of alcohol abuse  03/02/2021    History of gastric ulcer     History of L3 compression fracture 12/19/2018    History of lung cancer     s/p completion of XRT in 10/2020    Hyperlipidemia     Iron deficiency anemia     Osteoarthritis     Stage III CKD 2017     Past Surgical History:   Procedure Laterality Date    BREAST CYST EXCISION Right     1967    CATARACT EXTRACTION      COLONOSCOPY  2015    COLONOSCOPY N/A 6/8/2022    Procedure: COLONOSCOPY;  Surgeon: Helio Cheema MD;  Location: [facility] ENDO (2ND FLR);  Service: Endoscopy;  Laterality: N/A;  5/25-Pt requesting Dr. Cheema-approval given per Dr. Cheema-ml  Fully vaccinated, prep instr portal -ml    CORONARY ANGIOGRAPHY N/A 7/20/2018    Procedure: ANGIOGRAM, CORONARY ARTERY;  Surgeon: Willard Roberts MD;  Location: [facility] CATH LAB;  Service: Cardiovascular;  Laterality: N/A;    ENDOSCOPIC ULTRASOUND OF UPPER GASTROINTESTINAL TRACT N/A 3/24/2021    Procedure: ULTRASOUND, UPPER GI TRACT, ENDOSCOPIC;  Surgeon: Helio Cheema MD;  Location: [facility] ENDO (2ND FLR);  Service: Endoscopy;  Laterality: N/A;    ENDOSCOPIC ULTRASOUND OF UPPER GASTROINTESTINAL TRACT N/A 4/25/2022    Procedure: ULTRASOUND, UPPER GI TRACT, ENDOSCOPIC;  Surgeon: Helio Cheema MD;  Location: [facility] ENDO (2ND FLR);  Service: Endoscopy;  Laterality: N/A;  cardiac risk assessment 1 per Dr. Ortez. see t/e 3/17-SC  3/25:new instructions via portal. home with medical transport?-SC    ERCP N/A 3/24/2021    Procedure: ERCP (ENDOSCOPIC RETROGRADE CHOLANGIOPANCREATOGRAPHY);  Surgeon: Helio Cheema MD;  Location: [facility] ENDO (2ND FLR);  Service: Endoscopy;  Laterality: N/A;    ERCP N/A 4/30/2021    Procedure: ERCP (ENDOSCOPIC RETROGRADE CHOLANGIOPANCREATOGRAPHY);  Surgeon: Boo Gray MD;  Location: [facility] ENDO (2ND FLR);  Service: Endoscopy;  Laterality: N/A;    ERCP N/A 7/1/2021    Procedure: ERCP (ENDOSCOPIC RETROGRADE CHOLANGIOPANCREATOGRAPHY);  Surgeon: Helio Cheema MD;  Location: [facility] ENDO (2ND  FLR);  Service: Endoscopy;  Laterality: N/A;  Ok for Taxi. Dr Cheema  rapid covid 1130am- tb inst email    ESOPHAGOGASTRODUODENOSCOPY  2015    ESOPHAGOGASTRODUODENOSCOPY N/A 3/4/2021    Procedure: EGD (ESOPHAGOGASTRODUODENOSCOPY);  Surgeon: Yenifer Ramirez MD;  Location: [facility];  Service: Endoscopy;  Laterality: N/A;    ESOPHAGOGASTRODUODENOSCOPY N/A 3/24/2021    Procedure: EGD (ESOPHAGOGASTRODUODENOSCOPY);  Surgeon: Helio Cheema MD;  Location: [facility] (2ND FLR);  Service: Endoscopy;  Laterality: N/A;    EYE SURGERY  2016    Cataracts    FRACTURE SURGERY  2014    JOINT REPLACEMENT  2007    ORIF FEMUR FRACTURE Left     TOTAL KNEE ARTHROPLASTY Left 2007    TUBAL LIGATION       Family History   Problem Relation Age of Onset    Hypertension Mother     Alzheimer's disease Mother     Dementia Mother     Depression Mother         Alzheimers    Myasthenia gravis Father     Arthritis Father         Myasthenia Gravis    Rectal cancer Father     Hypertension Brother     Arthritis Brother         Colon cancer, obese, high blood pressure    Colon cancer Brother     No Known Problems Son     Cancer Sister         Brain cancer    Miscarriages / Stillbirths Sister     Melanoma Neg Hx     Breast cancer Neg Hx      Social History     Tobacco Use    Smoking status: Some Days     Packs/day: 1.00     Years: 53.00     Pack years: 53.00     Types: Cigarettes     Start date: 4/30/1967    Smokeless tobacco: Never    Tobacco comments:     I had quit for about 6 months this past year. Then massive stress and started back up sometimes   Substance Use Topics    Alcohol use: Yes     Alcohol/week: 7.0 standard drinks     Types: 7 Drinks containing 0.5 oz of alcohol per week     Comment: at least 1 cocktail a day    Drug use: No     Review of Systems  Constitutional-no fever  HEENT-no congestion  Eyes-no redness  Respiratory-Positive for shortness of breath.  Cardio-no chest pain  GI-Positive for diarrhea. Positive for abdominal  pain. Positive for vomiting.   Endocrine-no cold intolerance  -no difficulty urinating  MSK-no myalgias  Skin-no rashes  Allergy-no environmental allergy  Neurologic-, no headache  Hematology-no swollen nodes  Behavioral-no confusion    Physical Exam     Initial Vitals [01/17/23 1753]   BP Pulse Resp Temp SpO2   (!) 76/49 110 (!) 21 97.9 °F (36.6 °C) 97 %      MAP       --         Physical Exam  Constitutional: uncomfortable appearing 73 yo woman in moderate distress  Eyes: Conjunctivae normal.  ENT       Head: Normocephalic, atraumatic.       Nose: Normal external appearance        Mouth/Throat: no strigulous respirations   Hematological/Lymphatic/Immunilogical: no visible lymphadenopathy   Cardiovascular: Normal rate,   Respiratory: Normal respiratory effort.   Gastrointestinal: soft, diffusely tender, no rebound, no guarding  Musculoskeletal: Normal range of motion in all extremities. No obvious deformities or swelling.  Neurologic: Alert, oriented. Normal speech and language. No gross focal neurologic deficits are appreciated.  Skin: Skin is warm, dry. No rash noted.  Psychiatric: Mood and affect are normal.   ED Course   Critical Care    Date/Time: 1/17/2023 10:00 PM  Performed by: Tam Jones MD  Authorized by: Tam Jones MD   Direct patient critical care time: 20 minutes  Additional history critical care time: 10 minutes  Ordering / reviewing critical care time: 10 minutes  Documentation critical care time: 8 minutes  Consulting other physicians critical care time: 8 minutes  Total critical care time (exclusive of procedural time) : 56 minutes  Critical care time was exclusive of separately billable procedures and treating other patients and teaching time.  Critical care was necessary to treat or prevent imminent or life-threatening deterioration of the following conditions: endocrine crisis, shock and dehydration.  Critical care was time spent personally by me on the following  activities: blood draw for specimens, development of treatment plan with patient or surrogate, discussions with consultants, interpretation of cardiac output measurements, evaluation of patient's response to treatment, examination of patient, obtaining history from patient or surrogate, ordering and performing treatments and interventions, ordering and review of laboratory studies, pulse oximetry, ordering and review of radiographic studies, re-evaluation of patient's condition and review of old charts.      Labs Reviewed   CBC W/ AUTO DIFFERENTIAL - Abnormal; Notable for the following components:       Result Value    RBC 2.62 (*)     Hemoglobin 8.4 (*)     Hematocrit 25.4 (*)     MCH 32.1 (*)     RDW 17.1 (*)     Immature Granulocytes 0.6 (*)     Immature Grans (Abs) 0.05 (*)     Mono # 2.0 (*)     Lymph % 13.6 (*)     Mono % 22.4 (*)     All other components within normal limits   COMPREHENSIVE METABOLIC PANEL - Abnormal; Notable for the following components:    Sodium 127 (*)     CO2 13 (*)     Glucose 60 (*)     Calcium 6.8 (*)     Total Protein 5.6 (*)     Albumin 2.3 (*)     Anion Gap 18 (*)     eGFR 40 (*)     All other components within normal limits    Narrative:        critical result(s) called and verbal readback obtained from   CA 6.8  MG 0.9  NEL DOWLING RN by [facility] 01/17/2023 18:51   LACTIC ACID, PLASMA - Abnormal; Notable for the following components:    Lactate (Lactic Acid) 2.3 (*)     All other components within normal limits   MAGNESIUM - Abnormal; Notable for the following components:    Magnesium 0.9 (*)     All other components within normal limits    Narrative:        critical result(s) called and verbal readback obtained from   CA 6.8  MG 0.9  NEL DOWLING RN by [facility] 01/17/2023 18:51   PHOSPHORUS - Abnormal; Notable for the following components:    Phosphorus 1.2 (*)     All other components within normal limits   COMPREHENSIVE METABOLIC PANEL - Abnormal; Notable for the following  components:    Sodium 126 (*)     Potassium 3.2 (*)     CO2 17 (*)     Glucose 123 (*)     Calcium 6.3 (*)     Total Protein 4.3 (*)     Albumin 1.8 (*)     Alkaline Phosphatase 54 (*)     eGFR 43 (*)     All other components within normal limits    Narrative:     With 2nd set lactic acid  CA   critical result(s) called and verbal readback obtained from   Michelle Black RN by SRUKHSANA 01/17/2023 22:55   B-TYPE NATRIURETIC PEPTIDE - Abnormal; Notable for the following components:     (*)     All other components within normal limits    Narrative:     With 2nd set lactic acid   TSH - Abnormal; Notable for the following components:    TSH 0.350 (*)     All other components within normal limits    Narrative:     With 2nd set lactic acid   URIC ACID - Abnormal; Notable for the following components:    Uric Acid 13.2 (*)     All other components within normal limits    Narrative:     With 2nd set lactic acid   POCT GLUCOSE - Abnormal; Notable for the following components:    POCT Glucose 146 (*)     All other components within normal limits   CULTURE, BLOOD   CULTURE, BLOOD   CLOSTRIDIUM DIFFICILE   LACTIC ACID, PLASMA    Narrative:     With 2nd set lactic acid   CK   CK   T4, FREE    Narrative:     With 2nd set lactic acid   URINALYSIS, REFLEX TO URINE CULTURE   OSMOLALITY, URINE RANDOM   CREATININE, URINE, RANDOM   SODIUM, URINE, RANDOM   URIC ACID, URINE RANDOM   SARS-COV-2 RDRP GENE          Imaging Results              X-Ray Chest 1 View (Final result)  Result time 01/17/23 19:14:34      Final result by Vargas Hernandez MD (01/17/23 19:14:34)                   Impression:      1. Pulmonary findings suggest edema noting left pleural effusion.  Increased parenchymal attenuation projects over the right upper lung zone, developing consolidation is not excluded noting the appearance is similar to previous exams.  Differential would include nonspecific atelectasis or scarring.      Electronically signed by: Vargas  MD David  Date:    01/17/2023  Time:    19:14               Narrative:    EXAMINATION:  XR CHEST 1 VIEW    CLINICAL HISTORY:  Sepsis;    TECHNIQUE:  Single frontal view of the chest was performed.    COMPARISON:  12/23/2022    FINDINGS:  The cardiomediastinal silhouette is prominent, similar to the previous exam noting calcification of the aorta..  There is obscuration of the left costophrenic angle suggesting effusion or atelectasis..  The trachea is midline.  The lungs are symmetrically expanded bilaterally with coarse interstitial attenuation bilaterally, similar to the previous exam.  There is increased parenchymal attenuation projected over the right upper lung zone, developing consolidation not excluded.  There is bilateral basilar subsegmental atelectasis..  There is no pneumothorax.  The osseous structures are remarkable for degenerative change, particularly involving the left glenohumeral joint..                                       Medications   sodium chloride 0.9% flush 10 mL (has no administration in time range)   enoxaparin injection 40 mg (40 mg Subcutaneous Given 1/18/23 0039)   acetaminophen tablet 1,000 mg (has no administration in time range)   ondansetron injection 4 mg (has no administration in time range)   prochlorperazine injection Soln 5 mg (has no administration in time range)   simethicone chewable tablet 80 mg (has no administration in time range)   aluminum-magnesium hydroxide-simethicone 200-200-20 mg/5 mL suspension 30 mL (has no administration in time range)   sodium chloride 0.9% bolus 500 mL 500 mL (0 mLs Intravenous Stopped 1/17/23 1928)   calcium gluconate 1 g in NS IVPB (premixed) (0 g Intravenous Stopped 1/17/23 2030)   magnesium sulfate 2g in water 50mL IVPB (premix) (0 g Intravenous Stopped 1/17/23 2132)   sodium chloride 0.9% bolus 1,000 mL 1,000 mL (0 mLs Intravenous Stopped 1/17/23 2146)   dextrose 10% bolus 250 mL 250 mL (0 mLs Intravenous Stopped 1/17/23 2218)      Medical Decision Making:   History:   Old Medical Records: I decided to obtain old medical records.  Old Records Summarized: records from clinic visits and records from previous admission(s).  Differential Diagnosis:   Dehydration, hypomagnesemia, hypokalemia, hypercalcemia hypocalcemia C diff  Independently Interpreted Test(s):   I have ordered and independently interpreted X-rays - see prior notes.  I have ordered and independently interpreted EKG Reading(s) - see prior notes  Clinical Tests:   Lab Tests: Ordered and Reviewed  Radiological Study: Ordered and Reviewed  Medical Tests: Reviewed and Ordered  ED Management:  Woman who is hypotensive, chronic diarrhea, have initiated fluid resuscitation, profoundly diminished magnesium, calcium, likely contributing to her symptoms at this time.    Will plan for this patient undergo admission to the hospital at this time, continued to have low blood pressures despite the administration of 500 mL of fluid.  Administered 1 L normal saline thereafter, plan for this woman undergo admission to hospital, reassessment, repletion of electrolytes at this time.            Scribe Attestation:   Scribe #1: I performed the above scribed service and the documentation accurately describes the services I performed. I attest to the accuracy of the note.            Physician Attestation for Scribe: I, tam jones, reviewed documentation as scribed in my presence, which is both accurate and complete.       Clinical Impression:   Final diagnoses:  [E87.8] Electrolyte abnormality  [E83.51] Hypocalcemia (Primary)  [E83.42] Hypomagnesemia  [E86.0] Dehydration  [R19.7] Diarrhea, unspecified type  [I95.9] Hypotension, unspecified hypotension type        ED Disposition Condition    Admit Stable                Tam Jones MD  01/18/23 0105

## 2023-01-18 NOTE — HPI
Ms. Varma is a 73 yo with PMH of COPD on home 02 (100py smoking hx), prior HFrEF with improved EF, recent hypovolemic shock in 12/2022, RIGHT lung adenocarcinoma diagnosed 2020, now with presumed recurrence on chemo/XRT. She presented to [facility] ED on 1/17/22 with diarrhea, weakness, burning with urination, and shortness of breath. Of note, she is on chemo with last dose 1/6/22, and recently stopped her loperamide. She follows with Dr Gilmore for Heme/Onc, last seen 1 week ago for telehealth.     In the ED she was placed on NC at 4L, given IVF and electrolyte replacements as she was noted to have metabolic acidosis, hypophosphatemia, hyponatremia, hypomagnesemia, and urine studies consistent with UTI. She was afebrile with no leukocytosis. Pulmonology was consulted to evaluate presence of pleural effusion. BNP was elevated.     On visit to bedside Ms. Varma reports she is feeling somewhat better than yesterday, no longer reporting shortness of breath above baseline, still with chronic cough and still with worsened diarrhea. She reports she has never had thoracentesis done. Also reports she is uncertain if she wishes to continue with chemo at this time given her side effects. Her is exam is notable for ability to speak in full sentences, hemodynamically stable with sp02 96% on 3L, intermittent productive cough, no wheezing or prolonged expiratory phase, notable 1-2+ pitting edema on exam.     Bedside US with no effusion on R, very small effusion on L.     Social history: continues to smoke 1ppd, prior work as an   Surgical hx: s/p lung bx, eye surgery, joint replacement  Family hx: noncontributory    Prior studies:  Echo 2021, reduced EF to 38%, recovered in 2022 but with G2DD and mild PH  PFTs in 2020 show moderate obstruction, moderate restriction, and reduction in DLCO.   Chest imaging shows prior presence of L-sided pleural effusion, with intermittent bilateral effusions as far back as 2015  Biopsy of  JASSI lung nodule was done by IR in 9/2022, resulting in PTX s/p chest tube placement. Unfortunately specimen was nondiagnostic.

## 2023-01-18 NOTE — ASSESSMENT & PLAN NOTE
COPD GOLD 2D with PFTs confirming dx in 2020. Does not appear to be in COPD exacerbation at this time, would continue home inhalers.   -continue triple therapy with LABA/LAMA/ICS  -on home 02, would wean support for goal 02 88-92%  -discussed importance of smoking cessation, patient reports she has tried multiple adjuncts without success, still would like to quit. Will continue to discuss.

## 2023-01-18 NOTE — SUBJECTIVE & OBJECTIVE
Objective:     Vital Signs (Most Recent):  Temp: 98 °F (36.7 °C) (01/18/23 0733)  Pulse: 82 (01/18/23 0825)  Resp: 18 (01/18/23 0804)  BP: (!) 112/56 (01/18/23 0825)  SpO2: 100 % (01/18/23 0804)   Vital Signs (24h Range):  Temp:  [97.7 °F (36.5 °C)-98 °F (36.7 °C)] 98 °F (36.7 °C)  Pulse:  [] 82  Resp:  [16-25] 18  SpO2:  [97 %-100 %] 100 %  BP: ()/(43-56) 112/56     Weight: 96.2 kg (212 lb)  Body mass index is 34.22 kg/m².      Intake/Output Summary (Last 24 hours) at 1/18/2023 0940  Last data filed at 1/18/2023 0828  Gross per 24 hour   Intake 1655.86 ml   Output 100 ml   Net 1555.86 ml       Physical Exam  Constitutional:       General: She is not in acute distress.     Appearance: She is obese. She is ill-appearing. She is not toxic-appearing.   HENT:      Head: Atraumatic.      Nose: Congestion present.      Mouth/Throat:      Mouth: Mucous membranes are moist.   Eyes:      General: No scleral icterus.     Pupils: Pupils are equal, round, and reactive to light.   Cardiovascular:      Rate and Rhythm: Normal rate and regular rhythm.      Pulses: Normal pulses.      Heart sounds: No murmur heard.  Pulmonary:      Effort: Pulmonary effort is normal. No respiratory distress.      Breath sounds: No wheezing or rales.      Comments: Intermittent coughing, on 3-4L 02  Abdominal:      General: Abdomen is flat.      Palpations: Abdomen is soft.   Musculoskeletal:         General: Swelling present. No deformity. Normal range of motion.      Cervical back: Normal range of motion. No rigidity.      Right lower leg: Edema present.      Left lower leg: Edema present.   Skin:     General: Skin is warm.      Capillary Refill: Capillary refill takes less than 2 seconds.      Findings: Bruising present.   Neurological:      General: No focal deficit present.      Mental Status: She is alert and oriented to person, place, and time.   Psychiatric:         Mood and Affect: Mood normal.         Behavior: Behavior  normal.     Review of Systems   Constitutional: Negative.    HENT:  Positive for congestion and rhinorrhea.    Eyes: Negative.    Respiratory:  Positive for cough and shortness of breath.    Cardiovascular:  Positive for leg swelling.   Gastrointestinal:  Positive for diarrhea.   Endocrine: Negative.    Genitourinary:  Positive for dysuria.   Musculoskeletal: Negative.    Skin: Negative.    Allergic/Immunologic: Positive for immunocompromised state.   Neurological: Negative.    Hematological: Negative.    Psychiatric/Behavioral: Negative.       Significant Labs:    CBC/Anemia Profile:  Recent Labs   Lab 01/17/23 1803 01/18/23  0434   WBC 9.00 8.04   HGB 8.4* 7.3*   HCT 25.4* 21.3*    192   MCV 97 96   RDW 17.1* 17.2*        Chemistries:  Recent Labs   Lab 01/17/23  1803 01/17/23  2223 01/18/23  0434 01/18/23  0440 01/18/23  0753   * 126* 127*  --  129*   K 3.6 3.2* 3.7  --  3.3*   CL 96 96 98  --  101   CO2 13* 17* 20*  --  21*   BUN 20 19 22  --  20   CREATININE 1.4 1.3 1.5*  --  1.2   CALCIUM 6.8* 6.3* 6.7*  --  6.1*   ALBUMIN 2.3* 1.8* 2.1*  --   --    PROT 5.6* 4.3* 4.7*  --   --    BILITOT 0.4 0.3 0.4  --   --    ALKPHOS 70 54* 63  --   --    ALT 19 15 14  --   --    AST 31 24 25  --   --    MG 0.9*  --   --  1.4* 1.8   PHOS 1.2*  --   --   --   --        All pertinent labs within the past 24 hours have been reviewed.    Significant Imaging:  I have reviewed all pertinent imaging results/findings within the past 24 hours.

## 2023-01-18 NOTE — ASSESSMENT & PLAN NOTE
TTE in 12/2022 with EF of 55% and Grade II left ventricular diastolic dysfunction.  .    -See above

## 2023-01-18 NOTE — H&P
[facility] Medicine  History & Physical    Patient Name: Nalini Varma  MRN: [id number]  Patient Class: IP- Inpatient  Admission Date: 1/17/2023  Attending Physician: Paras Antonio MD   Primary Care Provider: Yane Sahni MD    Patient information was obtained from patient, past medical records and ER records.     Subjective:     Principal Problem:Acute on chronic respiratory failure with hypoxia and hypercapnia    Chief Complaint:   Chief Complaint   Patient presents with    Diarrhea     Patient presented to ER with c/o diarrhea x 6 days and an increase in SOB x 2 days. Patient on O2 at 4L NC. Stated her blood pressure has been low all day. Patient stated she is also receiving chemo and radiation for a swollen neck lymph nodes. Patient has several complaints. Lives alone.         HPI: Nalini Varma is a 72 year old female with PMHx of COPD (on home O2 3L) with 53 pack year smoking hx, current smoker, lung CA (currently on radiation and chemo) chronic anemia, anxiety, CKD Stage 3, systolic and diastolic heart failure (EF 55%, Grade II diastolic dysfunction, 12/24/22) presenting to ER for hypotension and increased shortness of breath for 6 days. Patient reports non-bloody diarrhea for 6 days, associated with loss of appetite, nausea, non-bloody vomiting, lethargy, chills. Patient also reports increased SOB but denies purulent sputum, sore throat or fever. Patient denies orthopnea or bilateral lower extremity swelling. Patient also reports dysuria and suprapubic tenderness.     H/H 8.4/25.4 elevated from baseline 7.7/23.3, likely due to hypovolemia. No leukocytosis but elevated lactate 2.3. UA with nitrites and +3 leukocytes. Hyponatremia 127 (baseline 133), hypocalcemia 8.2 (corrected for hypoalbuminemia 1.2). Cr elevated 1.4 from baseline 1.0, and an elevated AG 18. CXR with findings of  edema noting left pleural effusion. BNP elevated at 139. Patient was given IV calcium  gluconate 1g, IV Mag sulfate 2g and IV NaCl 0.9% 1.5L in ER. Nephrology and Pulmonology was consulted. Patient was started on IV Ceftriaxone 1g for symptomatic UTI.    Patient is admitted to inpatient service with Hospital Medicine for management of electrolyte abnormalities, left pleural effusion and symptomatic UTI.       Past Medical History:   Diagnosis Date    Anxiety     Cervical spinal stenosis     Choledocholithiasis     Chronic obstructive pulmonary disease 03/16/2016    Degenerative disc disease of cervical spine     Diverticulosis 04/24/2018    History of alcohol abuse 03/02/2021    History of gastric ulcer     History of L3 compression fracture 12/19/2018    History of lung cancer     s/p completion of XRT in 10/2020    Hyperlipidemia     Iron deficiency anemia     Osteoarthritis     Stage III CKD 2017       Past Surgical History:   Procedure Laterality Date    BREAST CYST EXCISION Right     1967    CATARACT EXTRACTION      COLONOSCOPY  2015    COLONOSCOPY N/A 6/8/2022    Procedure: COLONOSCOPY;  Surgeon: Helio Cheema MD;  Location: [address]);  Service: Endoscopy;  Laterality: N/A;  5/25-Pt requesting Dr. Cheema-approval given per Dr. Cheema-ml  Fully vaccinated, prep instr portal -ml    CORONARY ANGIOGRAPHY N/A 7/20/2018    Procedure: ANGIOGRAM, CORONARY ARTERY;  Surgeon: Willard Roberts MD;  Location: [facility] CATH LAB;  Service: Cardiovascular;  Laterality: N/A;    ENDOSCOPIC ULTRASOUND OF UPPER GASTROINTESTINAL TRACT N/A 3/24/2021    Procedure: ULTRASOUND, UPPER GI TRACT, ENDOSCOPIC;  Surgeon: Helio Cheema MD;  Location: [address]);  Service: Endoscopy;  Laterality: N/A;    ENDOSCOPIC ULTRASOUND OF UPPER GASTROINTESTINAL TRACT N/A 4/25/2022    Procedure: ULTRASOUND, UPPER GI TRACT, ENDOSCOPIC;  Surgeon: Helio Cheema MD;  Location: [address]);  Service: Endoscopy;  Laterality: N/A;  cardiac risk assessment 1 per Dr. Ortez. see t/e  3/17-SC  3/25:new instructions via portal. home with medical transport?-SC    ERCP N/A 3/24/2021    Procedure: ERCP (ENDOSCOPIC RETROGRADE CHOLANGIOPANCREATOGRAPHY);  Surgeon: Helio Cheema MD;  Location: [facility] (2ND FLR);  Service: Endoscopy;  Laterality: N/A;    ERCP N/A 4/30/2021    Procedure: ERCP (ENDOSCOPIC RETROGRADE CHOLANGIOPANCREATOGRAPHY);  Surgeon: Boo Gray MD;  Location: [facility] (2ND FLR);  Service: Endoscopy;  Laterality: N/A;    ERCP N/A 7/1/2021    Procedure: ERCP (ENDOSCOPIC RETROGRADE CHOLANGIOPANCREATOGRAPHY);  Surgeon: Helio Cheema MD;  Location: [facility] (2ND FLR);  Service: Endoscopy;  Laterality: N/A;  Ok for Taxi. Dr Cheema  rapid covid 1130am- tb inst email    ESOPHAGOGASTRODUODENOSCOPY  2015    ESOPHAGOGASTRODUODENOSCOPY N/A 3/4/2021    Procedure: EGD (ESOPHAGOGASTRODUODENOSCOPY);  Surgeon: Yenifer Ramirez MD;  Location: [facility];  Service: Endoscopy;  Laterality: N/A;    ESOPHAGOGASTRODUODENOSCOPY N/A 3/24/2021    Procedure: EGD (ESOPHAGOGASTRODUODENOSCOPY);  Surgeon: Helio Cheema MD;  Location: [facility] (2ND FLR);  Service: Endoscopy;  Laterality: N/A;    EYE SURGERY  2016    Cataracts    FRACTURE SURGERY  2014    JOINT REPLACEMENT  2007    ORIF FEMUR FRACTURE Left     TOTAL KNEE ARTHROPLASTY Left 2007    TUBAL LIGATION         Review of patient's allergies indicates:   Allergen Reactions    Wellbutrin [bupropion hcl] Other (See Comments)     Hyponatremia and hypomagnesia     Zoloft [sertraline] Other (See Comments)     Hyponatremia and hypomagnesia     Bananas [banana]      Emesis, and stomach cramps    Patient states she is not allergic to Banana       No current facility-administered medications on file prior to encounter.     Current Outpatient Medications on File Prior to Encounter   Medication Sig    albuterol (VENTOLIN HFA) 90 mcg/actuation inhaler inhale 1-2 puffs as needed every 6 hours for wheezing and shortness of breath     ALPRAZolam (XANAX) 0.25 MG tablet Take 1 tablet (0.25 mg total) by mouth daily as needed for Anxiety.    ascorbic acid, vitamin C, (VITAMIN C) 250 MG tablet Take 250 mg by mouth once daily.    atorvastatin (LIPITOR) 40 MG tablet TAKE 1 TABLET BY MOUTH EVERY DAILY    azelastine (ASTELIN) 137 mcg (0.1 %) nasal spray Instill 1 spray (137 mcg total) by Nasal route 2 (two) times daily.    b complex vitamins capsule Take 1 capsule by mouth once daily.    biotin 10 mg Tab Take 10 mg by mouth once daily.    calcium carbonate (OS-SANDRINE) 500 mg calcium (1,250 mg) tablet Take 2 tablets (1,000 mg total) by mouth 2 (two) times daily.    cyanocobalamin, vitamin B-12, 1,000 mcg TbSR Take 1,000 mcg by mouth once daily.    denosumab (PROLIA) 60 mg/mL Syrg Inject 1 mL (60 mg total) into the skin every 6 (six) months.    DULoxetine (CYMBALTA) 60 MG capsule Take 1 capsule (60 mg total) by mouth once daily.    fluticasone (FLONASE) 50 mcg/actuation nasal spray 1 spray by Each Nare route 2 (two) times daily as needed for Rhinitis.    fluticasone propion-salmeterol 115-21 mcg/dose (ADVAIR HFA) 115-21 mcg/actuation HFAA inhaler Inhale 2 puffs into the lungs every 12 (twelve) hours.    gabapentin (NEURONTIN) 300 MG capsule Take 1 capsule (300 mg total) by mouth 3 (three) times daily.    guaiFENesin (MUCINEX) 600 mg 12 hr tablet Take 1,200 mg by mouth 2 (two) times daily as needed for Congestion.    HYDROcodone-acetaminophen (NORCO)  mg per tablet Take 1 tablet by mouth every 12 (twelve) hours as needed for Pain.    losartan (COZAAR) 100 MG tablet Take 1 tablet (100 mg total) by mouth once daily.    lutein 40 mg Cap Take by mouth.    magnesium oxide (MAG-OX) 400 mg (241.3 mg magnesium) tablet Take 1 tablet by mouth every 12 (twelve) hours.    metoprolol succinate (TOPROL-XL) 25 MG 24 hr tablet Take 2 tablets (50 mg total) by mouth once daily.    multivit-min/iron/folic/lutein (CENTRUM SILVER WOMEN ORAL) Take 1 tablet  by mouth once daily.    ondansetron (ZOFRAN-ODT) 8 MG TbDL dissolve 1 tablet (8 mg total) by mouth every 8 (eight) hours as needed (nausea).    pantoprazole (PROTONIX) 40 MG tablet Take 1 tablet (40 mg total) by mouth once daily.    promethazine (PHENERGAN) 25 MG tablet Take 1 tablet (25 mg total) by mouth every 6 (six) hours as needed for Nausea.    torsemide (DEMADEX) 20 MG Tab Take 1 tablet (20 mg total) by mouth once daily.    traZODone (DESYREL) 100 MG tablet Take 1 tablet (100 mg total) by mouth every evening.    umeclidinium (INCRUSE ELLIPTA) 62.5 mcg/actuation inhalation capsule Inhale 1 puff into the lungs once daily. Controller    vitamin D (VITAMIN D3) 1000 units Tab Take 1 tablet (1,000 Units total) by mouth once daily.    ZINC ORAL Take by mouth.     Family History       Problem Relation (Age of Onset)    Alzheimer's disease Mother    Arthritis Father, Brother    Cancer Sister    Colon cancer Brother    Dementia Mother    Depression Mother    Hypertension Mother, Brother    Miscarriages / Stillbirths Sister    Myasthenia gravis Father    No Known Problems Son    Rectal cancer Father          Tobacco Use    Smoking status: Some Days     Packs/day: 1.00     Years: 53.00     Pack years: 53.00     Types: Cigarettes     Start date: 4/30/1967    Smokeless tobacco: Never    Tobacco comments:     I had quit for about 6 months this past year. Then massive stress and started back up sometimes   Substance and Sexual Activity    Alcohol use: Yes     Alcohol/week: 7.0 standard drinks     Types: 7 Drinks containing 0.5 oz of alcohol per week     Comment: at least 1 cocktail a day    Drug use: No    Sexual activity: Not Currently     Partners: Male     Birth control/protection: Abstinence, Post-menopausal     Review of Systems   Constitutional:  Positive for activity change, appetite change (loss of appetite for 1 week), chills and fatigue. Negative for fever.   HENT:  Positive for congestion. Negative  for sore throat.    Eyes:  Negative for pain and redness.   Respiratory:  Positive for cough (chronic, wet, clear sputum) and shortness of breath.    Cardiovascular:  Negative for chest pain and leg swelling.   Gastrointestinal:  Positive for diarrhea (non-bloody), nausea and vomiting (non-bloody).   Genitourinary:  Positive for dysuria.        Suprapubic pain   Musculoskeletal:  Positive for myalgias (worse in bilateral lower extremities). Negative for neck pain.   Skin:  Negative for rash and wound.   Neurological:  Negative for weakness, numbness and headaches.   Psychiatric/Behavioral:  Negative for agitation and behavioral problems. The patient is nervous/anxious.    Objective:     Vital Signs (Most Recent):  Temp: 97.7 °F (36.5 °C) (01/17/23 2217)  Pulse: 85 (01/18/23 0402)  Resp: 19 (01/18/23 0402)  BP: (!) 104/52 (01/18/23 0402)  SpO2: 99 % (01/18/23 0402)   Vital Signs (24h Range):  Temp:  [97.7 °F (36.5 °C)-97.9 °F (36.6 °C)] 97.7 °F (36.5 °C)  Pulse:  [] 85  Resp:  [17-25] 19  SpO2:  [97 %-100 %] 99 %  BP: ()/(43-55) 104/52     Weight: 96.2 kg (212 lb)  Body mass index is 34.22 kg/m².    Physical Exam  Vitals and nursing note reviewed.   Constitutional:       Appearance: She is not ill-appearing.   HENT:      Head: Normocephalic and atraumatic.      Nose: No congestion or rhinorrhea.   Eyes:      General: No scleral icterus.        Right eye: No discharge.         Left eye: No discharge.   Cardiovascular:      Rate and Rhythm: Normal rate and regular rhythm.   Pulmonary:      Effort: Respiratory distress (laboured breathing) present.      Breath sounds: Wheezing (expiratory wheeze) present.      Comments: On 4L O2 NC  Abdominal:      General: Bowel sounds are normal. There is no distension.      Tenderness: There is abdominal tenderness (suprapubic tenderness).   Musculoskeletal:      Right lower leg: Edema (trace edema) present.      Left lower leg: Edema (trace edema) present.   Skin:      General: Skin is warm and dry.   Neurological:      Mental Status: She is oriented to person, place, and time. Mental status is at baseline.   Psychiatric:         Mood and Affect: Mood normal.         Behavior: Behavior normal.           Significant Labs: All pertinent labs within the past 24 hours have been reviewed.  BMP:   Recent Labs   Lab 01/17/23  1803 01/17/23  2223   GLU 60* 123*   * 126*   K 3.6 3.2*   CL 96 96   CO2 13* 17*   BUN 20 19   CREATININE 1.4 1.3   CALCIUM 6.8* 6.3*   MG 0.9*  --      CBC:   Recent Labs   Lab 01/17/23  1803   WBC 9.00   HGB 8.4*   HCT 25.4*        CMP:   Recent Labs   Lab 01/17/23  1803 01/17/23  2223   * 126*   K 3.6 3.2*   CL 96 96   CO2 13* 17*   GLU 60* 123*   BUN 20 19   CREATININE 1.4 1.3   CALCIUM 6.8* 6.3*   PROT 5.6* 4.3*   ALBUMIN 2.3* 1.8*   BILITOT 0.4 0.3   ALKPHOS 70 54*   AST 31 24   ALT 19 15   ANIONGAP 18* 13     Lactic Acid:   Recent Labs   Lab 01/17/23  1820 01/17/23  2223   LACTATE 2.3* 0.7     Magnesium:   Recent Labs   Lab 01/17/23  1803   MG 0.9*     Urine Studies:   Recent Labs   Lab 01/18/23  0038   COLORU Yellow   APPEARANCEUA Cloudy*   PHUR 6.0   SPECGRAV 1.010   PROTEINUA 1+*   GLUCUA Negative   KETONESU Trace*   BILIRUBINUA Negative   OCCULTUA 2+*   NITRITE Positive*   UROBILINOGEN Negative   LEUKOCYTESUR 3+*   RBCUA 44*   WBCUA >100*   BACTERIA Occasional   SQUAMEPITHEL 15   HYALINECASTS 0       Significant Imaging: I have reviewed and interpreted all pertinent imaging results/findings within the past 24 hours.    Assessment/Plan:     * Acute on chronic respiratory failure with hypoxia and hypercapnia  Patient with Hypercapnic and Hypoxic Respiratory failure which is Acute on chronic.  she is on home oxygen at 3 LPM. Supplemental oxygen was provided and noted-  .   Signs/symptoms of respiratory failure include- tachypnea, increased work of breathing, respiratory distress, wheezing and lethargy. Contributing diagnoses includes -  CHF, COPD, Obesity Hypoventilation and Pleural effusion Labs and images were reviewed. Patient Has not had a recent ABG.     Acute respiratory distress likely due to pleural effusion with some COPD exacerbation. Less likely to suspect ongoing infective process given no purulent sputum. Tend to favor patient being dry than wet, given elevated H/H and JONATHAN although patient has elevated BNP and pleural effusion, but with a b/g of lung Ca and hypoalbuminemia.     Plan:  - O2 at tolerated to keep SpO2 >92%   - Pulmonology consult for left pleural effusion  - Duonebs Q4 wake  - Encourage smoking cessation  - Hold home meds torsemide 20mg PO daily and losartan 100mg PO daily  - Strict intake and output  - Cardiac diet instead of low sodium diet     Hypovolemia dehydration  As above      Electrolyte abnormality  Hyponatremia, hypokalemia, hypomagnesemia, hypocalcemia, hypoalbuminemia  Nephrology consulted, appreciate input. Urine osm, Na, Cr, uric acid, serum urice acid, TSH, BNP checked.     Plan:  - PO KCl 20 mEq TID   - IV Mag sulfate 2g replacement   - Consult dietitian for hypoalbuminemia   - Cardiac diet with 1L fluid restriction  - Q4 BMP with mag and phos  - Cardiac monitoring  - Strict intake and output  - IV NaCl 75ml/hr     Advance care planning  Patient wants to continue full code, with chest compressions and mechanical ventilation.      Acute renal failure superimposed on stage 3 chronic kidney disease  Elevated Cr 1.4 on a baseline of 1.0.   Likely due to hypovolemia from GI losses  Nephrology on board    - IVF 75ml/hr NaCl 0.9%  - Trend BMP  - Avoid nephrotoxins, renally dose medication    Recurrent adenocarcinoma of left lung  Patient is currently on chemo (last on 1/6/23) and radiation  Reports missing radiation the whole of last week    - Touch base with rad onc       MDD (major depressive disorder), recurrent severe, without psychosis  Patient has persistent depression which is unknown and is currently  controlled. Will Continue anti-depressant medications. We will not consult psychiatry at this time. Patient does not display psychosis at this time. Continue to monitor closely and adjust plan of care as needed.    - As above        Hypocalcemia  As above      Hyponatremia  As above      Urinary tract infection without hematuria  Dysuria and suprapubic tenderness  UA with leukocytes +3 and nitrites  Previous positive urine culture with E Coli in 2021, sensitive to ceftriaxone     - IV Ceftriaxone 1g daily  - Urine cultures pending    Chronic combined systolic and diastolic CHF (congestive heart failure)  Patient is identified as having Combined Systolic and Diastolic heart failure that is Acute on chronic. CHF is currently controlled and uncontrolled due to Pulmonary edema/pleural effusion on CXR. Latest ECHO performed and demonstrates- Results for orders placed during the hospital encounter of 12/23/22    Echo    Interpretation Summary  · The left ventricle is normal in size with concentric remodeling and normal systolic function.  · Mild left atrial enlargement.  · Grade II left ventricular diastolic dysfunction.  · Normal right ventricular size with normal right ventricular systolic function.  · There is moderate aortic valve stenosis.  · Mild mitral regurgitation.  · Mild tricuspid regurgitation.  · There is mild pulmonary hypertension.  . Continue Beta Blocker, ACE/ARB and Furosemide and monitor clinical status closely. Monitor on telemetry. Patient is off CHF pathway.  Monitor strict Is&Os and daily weights.  Place on fluid restriction of 1 L. Continue to stress to patient importance of self efficacy and  on diet for CHF. Last BNP reviewed- and noted below   Recent Labs   Lab 01/17/23  2223   *   .  Plan:  As above       Hypomagnesemia  As above      Generalized anxiety disorder  Hx of anxiety, MDD.  Chronic. Controlled.    - Alprazolam 0.25mg PRN  - Resume home meds PO duloxetine 60mg daily,  trazodone 100mg qhs PRN      Tobacco dependence  Patient denies need for nicotine patches. Wants to quit cold turkey.    - Encourage smoking cessation      Essential hypertension  Hypotensive from GI losses and sepsis secondary to UTI     - Hold home dose losartan 100mg PO daily      Chronic obstructive pulmonary disease  As above        VTE Risk Mitigation (From admission, onward)         Ordered     enoxaparin injection 40 mg  Daily         01/17/23 2236     IP VTE HIGH RISK PATIENT  Once         01/17/23 2236     Place sequential compression device  Until discontinued         01/17/23 2236                   Radhika Sunshine NP  Department of Hospital Medicine   [facility] - Emergency Dept

## 2023-01-18 NOTE — ASSESSMENT & PLAN NOTE
Hx of anxiety, MDD.  Chronic. Controlled.  - Alprazolam 0.25mg PRN  - Resume home meds PO duloxetine 60mg daily, trazodone 100mg qhs PRN

## 2023-01-18 NOTE — ASSESSMENT & PLAN NOTE
Elevated Cr 1.4 on a baseline of 1.0.   Likely due to hypovolemia from GI losses  Nephrology on board    - IVF 75ml/hr NaCl 0.9%  - Trend BMP  - Avoid nephrotoxins, renally dose medication

## 2023-01-18 NOTE — ASSESSMENT & PLAN NOTE
Hypotensive from GI losses and sepsis secondary to UTI   -Hold home dose losartan 100mg PO daily

## 2023-01-18 NOTE — SUBJECTIVE & OBJECTIVE
Past Medical History:   Diagnosis Date    Anxiety     Cervical spinal stenosis     Choledocholithiasis     Chronic obstructive pulmonary disease 03/16/2016    Degenerative disc disease of cervical spine     Diverticulosis 04/24/2018    History of alcohol abuse 03/02/2021    History of gastric ulcer     History of L3 compression fracture 12/19/2018    History of lung cancer     s/p completion of XRT in 10/2020    Hyperlipidemia     Iron deficiency anemia     Osteoarthritis     Stage III CKD 2017       Past Surgical History:   Procedure Laterality Date    BREAST CYST EXCISION Right     1967    CATARACT EXTRACTION      COLONOSCOPY  2015    COLONOSCOPY N/A 6/8/2022    Procedure: COLONOSCOPY;  Surgeon: Helio Cheema MD;  Location: [facility] ([address] [place] [address]);  Service: Endoscopy;  Laterality: N/A;  5/25-Pt requesting Dr. Cheema-approval given per Dr. Cheema-ml  Fully vaccinated, prep instr portal -ml    CORONARY ANGIOGRAPHY N/A 7/20/2018    Procedure: ANGIOGRAM, CORONARY ARTERY;  Surgeon: Willard Roberts MD;  Location: [facility] CATH LAB;  Service: Cardiovascular;  Laterality: N/A;    ENDOSCOPIC ULTRASOUND OF UPPER GASTROINTESTINAL TRACT N/A 3/24/2021    Procedure: ULTRASOUND, UPPER GI TRACT, ENDOSCOPIC;  Surgeon: Helio Cheema MD;  Location: [address]);  Service: Endoscopy;  Laterality: N/A;    ENDOSCOPIC ULTRASOUND OF UPPER GASTROINTESTINAL TRACT N/A 4/25/2022    Procedure: ULTRASOUND, UPPER GI TRACT, ENDOSCOPIC;  Surgeon: Helio Cheema MD;  [address]  Service: Endoscopy;  Laterality: N/A;  cardiac risk assessment 1 per Dr. Ortez. see t/e 3/17-SC  3/25:new instructions via portal. home with medical transport?-SC    ERCP N/A 3/24/2021    Procedure: ERCP (ENDOSCOPIC RETROGRADE CHOLANGIOPANCREATOGRAPHY);  Surgeon: Helio Cheema MD;  [facility]  Service: Endoscopy;  Laterality: N/A;    ERCP N/A 4/30/2021    Procedure: ERCP (ENDOSCOPIC RETROGRADE  CHOLANGIOPANCREATOGRAPHY);  Surgeon: Boo Gray MD;  Location: [facility] ENDO (2ND FLR);  Service: Endoscopy;  Laterality: N/A;    ERCP N/A 7/1/2021    Procedure: ERCP (ENDOSCOPIC RETROGRADE CHOLANGIOPANCREATOGRAPHY);  Surgeon: Helio Cheema MD;  Location: [facility] ENDO (2ND FLR);  Service: Endoscopy;  Laterality: N/A;  Ok for Taxi. Dr Cheema  rapid covid 1130am- tb inst email    ESOPHAGOGASTRODUODENOSCOPY  2015    ESOPHAGOGASTRODUODENOSCOPY N/A 3/4/2021    Procedure: EGD (ESOPHAGOGASTRODUODENOSCOPY);  Surgeon: Yenifer Ramirez MD;  Location: [facility];  Service: Endoscopy;  Laterality: N/A;    ESOPHAGOGASTRODUODENOSCOPY N/A 3/24/2021    Procedure: EGD (ESOPHAGOGASTRODUODENOSCOPY);  Surgeon: Helio Cheema MD;  Location: [facility] (2ND FLR);  Service: Endoscopy;  Laterality: N/A;    EYE SURGERY  2016    Cataracts    FRACTURE SURGERY  2014    JOINT REPLACEMENT  2007    ORIF FEMUR FRACTURE Left     TOTAL KNEE ARTHROPLASTY Left 2007    TUBAL LIGATION         Review of patient's allergies indicates:   Allergen Reactions    Wellbutrin [bupropion hcl] Other (See Comments)     Hyponatremia and hypomagnesia     Zoloft [sertraline] Other (See Comments)     Hyponatremia and hypomagnesia     Bananas [banana]      Emesis, and stomach cramps    Patient states she is not allergic to Banana       No current facility-administered medications on file prior to encounter.     Current Outpatient Medications on File Prior to Encounter   Medication Sig    albuterol (VENTOLIN HFA) 90 mcg/actuation inhaler inhale 1-2 puffs as needed every 6 hours for wheezing and shortness of breath    ALPRAZolam (XANAX) 0.25 MG tablet Take 1 tablet (0.25 mg total) by mouth daily as needed for Anxiety.    ascorbic acid, vitamin C, (VITAMIN C) 250 MG tablet Take 250 mg by mouth once daily.    atorvastatin (LIPITOR) 40 MG tablet TAKE 1 TABLET BY MOUTH EVERY DAILY    azelastine (ASTELIN) 137 mcg (0.1 %) nasal spray Instill 1 spray (137 mcg total) by  Nasal route 2 (two) times daily.    b complex vitamins capsule Take 1 capsule by mouth once daily.    biotin 10 mg Tab Take 10 mg by mouth once daily.    calcium carbonate (OS-SANDRINE) 500 mg calcium (1,250 mg) tablet Take 2 tablets (1,000 mg total) by mouth 2 (two) times daily.    cyanocobalamin, vitamin B-12, 1,000 mcg TbSR Take 1,000 mcg by mouth once daily.    denosumab (PROLIA) 60 mg/mL Syrg Inject 1 mL (60 mg total) into the skin every 6 (six) months.    DULoxetine (CYMBALTA) 60 MG capsule Take 1 capsule (60 mg total) by mouth once daily.    fluticasone (FLONASE) 50 mcg/actuation nasal spray 1 spray by Each Nare route 2 (two) times daily as needed for Rhinitis.    fluticasone propion-salmeterol 115-21 mcg/dose (ADVAIR HFA) 115-21 mcg/actuation HFAA inhaler Inhale 2 puffs into the lungs every 12 (twelve) hours.    gabapentin (NEURONTIN) 300 MG capsule Take 1 capsule (300 mg total) by mouth 3 (three) times daily.    guaiFENesin (MUCINEX) 600 mg 12 hr tablet Take 1,200 mg by mouth 2 (two) times daily as needed for Congestion.    HYDROcodone-acetaminophen (NORCO)  mg per tablet Take 1 tablet by mouth every 12 (twelve) hours as needed for Pain.    losartan (COZAAR) 100 MG tablet Take 1 tablet (100 mg total) by mouth once daily.    lutein 40 mg Cap Take by mouth.    magnesium oxide (MAG-OX) 400 mg (241.3 mg magnesium) tablet Take 1 tablet by mouth every 12 (twelve) hours.    metoprolol succinate (TOPROL-XL) 25 MG 24 hr tablet Take 2 tablets (50 mg total) by mouth once daily.    multivit-min/iron/folic/lutein (CENTRUM SILVER WOMEN ORAL) Take 1 tablet by mouth once daily.    ondansetron (ZOFRAN-ODT) 8 MG TbDL dissolve 1 tablet (8 mg total) by mouth every 8 (eight) hours as needed (nausea).    pantoprazole (PROTONIX) 40 MG tablet Take 1 tablet (40 mg total) by mouth once daily.    promethazine (PHENERGAN) 25 MG tablet Take 1 tablet (25 mg total) by mouth every 6 (six) hours as needed for Nausea.    torsemide  (DEMADEX) 20 MG Tab Take 1 tablet (20 mg total) by mouth once daily.    traZODone (DESYREL) 100 MG tablet Take 1 tablet (100 mg total) by mouth every evening.    umeclidinium (INCRUSE ELLIPTA) 62.5 mcg/actuation inhalation capsule Inhale 1 puff into the lungs once daily. Controller    vitamin D (VITAMIN D3) 1000 units Tab Take 1 tablet (1,000 Units total) by mouth once daily.    ZINC ORAL Take by mouth.     Family History       Problem Relation (Age of Onset)    Alzheimer's disease Mother    Arthritis Father, Brother    Cancer Sister    Colon cancer Brother    Dementia Mother    Depression Mother    Hypertension Mother, Brother    Miscarriages / Stillbirths Sister    Myasthenia gravis Father    No Known Problems Son    Rectal cancer Father          Tobacco Use    Smoking status: Some Days     Packs/day: 1.00     Years: 53.00     Pack years: 53.00     Types: Cigarettes     Start date: 4/30/1967    Smokeless tobacco: Never    Tobacco comments:     I had quit for about 6 months this past year. Then massive stress and started back up sometimes   Substance and Sexual Activity    Alcohol use: Yes     Alcohol/week: 7.0 standard drinks     Types: 7 Drinks containing 0.5 oz of alcohol per week     Comment: at least 1 cocktail a day    Drug use: No    Sexual activity: Not Currently     Partners: Male     Birth control/protection: Abstinence, Post-menopausal     Review of Systems   Constitutional:  Positive for activity change, appetite change (loss of appetite for 1 week), chills and fatigue. Negative for fever.   HENT:  Positive for congestion. Negative for sore throat.    Eyes:  Negative for pain and redness.   Respiratory:  Positive for cough (chronic, wet, clear sputum) and shortness of breath.    Cardiovascular:  Negative for chest pain and leg swelling.   Gastrointestinal:  Positive for diarrhea (non-bloody), nausea and vomiting (non-bloody).   Genitourinary:  Positive for dysuria.        Suprapubic pain    Musculoskeletal:  Positive for myalgias (worse in bilateral lower extremities). Negative for neck pain.   Skin:  Negative for rash and wound.   Neurological:  Negative for weakness, numbness and headaches.   Psychiatric/Behavioral:  Negative for agitation and behavioral problems. The patient is nervous/anxious.    Objective:     Vital Signs (Most Recent):  Temp: 97.7 °F (36.5 °C) (01/17/23 2217)  Pulse: 85 (01/18/23 0402)  Resp: 19 (01/18/23 0402)  BP: (!) 104/52 (01/18/23 0402)  SpO2: 99 % (01/18/23 0402)   Vital Signs (24h Range):  Temp:  [97.7 °F (36.5 °C)-97.9 °F (36.6 °C)] 97.7 °F (36.5 °C)  Pulse:  [] 85  Resp:  [17-25] 19  SpO2:  [97 %-100 %] 99 %  BP: ()/(43-55) 104/52     Weight: 96.2 kg (212 lb)  Body mass index is 34.22 kg/m².    Physical Exam  Vitals and nursing note reviewed.   Constitutional:       Appearance: She is not ill-appearing.   HENT:      Head: Normocephalic and atraumatic.      Nose: No congestion or rhinorrhea.   Eyes:      General: No scleral icterus.        Right eye: No discharge.         Left eye: No discharge.   Cardiovascular:      Rate and Rhythm: Normal rate and regular rhythm.   Pulmonary:      Effort: Respiratory distress (laboured breathing) present.      Breath sounds: Wheezing (expiratory wheeze) present.      Comments: On 4L O2 NC  Abdominal:      General: Bowel sounds are normal. There is no distension.      Tenderness: There is abdominal tenderness (suprapubic tenderness).   Musculoskeletal:      Right lower leg: Edema (trace edema) present.      Left lower leg: Edema (trace edema) present.   Skin:     General: Skin is warm and dry.   Neurological:      Mental Status: She is oriented to person, place, and time. Mental status is at baseline.   Psychiatric:         Mood and Affect: Mood normal.         Behavior: Behavior normal.           Significant Labs: All pertinent labs within the past 24 hours have been reviewed.  BMP:   Recent Labs   Lab 01/17/23  7021  01/17/23  2223   GLU 60* 123*   * 126*   K 3.6 3.2*   CL 96 96   CO2 13* 17*   BUN 20 19   CREATININE 1.4 1.3   CALCIUM 6.8* 6.3*   MG 0.9*  --      CBC:   Recent Labs   Lab 01/17/23  1803   WBC 9.00   HGB 8.4*   HCT 25.4*        CMP:   Recent Labs   Lab 01/17/23  1803 01/17/23  2223   * 126*   K 3.6 3.2*   CL 96 96   CO2 13* 17*   GLU 60* 123*   BUN 20 19   CREATININE 1.4 1.3   CALCIUM 6.8* 6.3*   PROT 5.6* 4.3*   ALBUMIN 2.3* 1.8*   BILITOT 0.4 0.3   ALKPHOS 70 54*   AST 31 24   ALT 19 15   ANIONGAP 18* 13     Lactic Acid:   Recent Labs   Lab 01/17/23  1820 01/17/23  2223   LACTATE 2.3* 0.7     Magnesium:   Recent Labs   Lab 01/17/23  1803   MG 0.9*     Urine Studies:   Recent Labs   Lab 01/18/23  0038   COLORU Yellow   APPEARANCEUA Cloudy*   PHUR 6.0   SPECGRAV 1.010   PROTEINUA 1+*   GLUCUA Negative   KETONESU Trace*   BILIRUBINUA Negative   OCCULTUA 2+*   NITRITE Positive*   UROBILINOGEN Negative   LEUKOCYTESUR 3+*   RBCUA 44*   WBCUA >100*   BACTERIA Occasional   SQUAMEPITHEL 15   HYALINECASTS 0       Significant Imaging: I have reviewed and interpreted all pertinent imaging results/findings within the past 24 hours.

## 2023-01-18 NOTE — CONSULTS
[facility] - Emergency Dept  Wound Care    Patient Name:  Nalini Varma   MRN:  [id number]  Date: 1/18/2023  Diagnosis: Pleural effusion    History:     Past Medical History:   Diagnosis Date    Anxiety     Cervical spinal stenosis     Choledocholithiasis     Chronic obstructive pulmonary disease 03/16/2016    Degenerative disc disease of cervical spine     Diverticulosis 04/24/2018    History of alcohol abuse 03/02/2021    History of gastric ulcer     History of L3 compression fracture 12/19/2018    History of lung cancer     s/p completion of XRT in 10/2020    Hyperlipidemia     Iron deficiency anemia     Osteoarthritis     Stage III CKD 2017       Social History     Socioeconomic History    Marital status: Single    Number of children: 2   Occupational History    Occupation: unemployed   Tobacco Use    Smoking status: Some Days     Packs/day: 1.00     Years: 53.00     Pack years: 53.00     Types: Cigarettes     Start date: 4/30/1967    Smokeless tobacco: Never    Tobacco comments:     I had quit for about 6 months this past year. Then massive stress and started back up sometimes   Substance and Sexual Activity    Alcohol use: Yes     Alcohol/week: 7.0 standard drinks     Types: 7 Drinks containing 0.5 oz of alcohol per week     Comment: at least 1 cocktail a day    Drug use: No    Sexual activity: Not Currently     Partners: Male     Birth control/protection: Abstinence, Post-menopausal   Other Topics Concern    Are you pregnant or think you may be? No   Social History Narrative    Originally from [place] [place] in [facility] since [place]    Living in [facility]     Social Determinants of Health     Financial Resource Strain: Medium Risk    Difficulty of Paying Living Expenses: Somewhat hard   Food Insecurity: Food Insecurity Present    Worried About Running Out of Food in the Last Year: Sometimes true    Ran Out of Food in the Last Year: Sometimes true   Transportation Needs: Unmet Transportation Needs    Lack of  Transportation (Medical): Yes    Lack of Transportation (Non-Medical): Yes   Physical Activity: Insufficiently Active    Days of Exercise per Week: 3 days    Minutes of Exercise per Session: 30 min   Stress: No Stress Concern Present    Feeling of Stress : Only a little   Social Connections: Unknown    Frequency of Communication with Friends and Family: Once a week    Frequency of Social Gatherings with Friends and Family: Three times a week    Active Member of Clubs or Organizations: No    Attends Club or Organization Meetings: Patient refused    Marital Status:    Housing Stability: High Risk    Unable to Pay for Housing in the Last Year: Yes    Number of Places Lived in the Last Year: 5    Unstable Housing in the Last Year: Yes       Precautions:     Allergies as of 01/17/2023 - Reviewed 01/17/2023   Allergen Reaction Noted    Wellbutrin [bupropion hcl] Other (See Comments) 06/28/2017    Zoloft [sertraline] Other (See Comments) 06/28/2017    Bananas [banana]  08/27/2021       [facility] Assessment Details/Treatment     Wound care consult received for assessment of right foot and buttocks. Patient is a 72 year old female admitted for pleural effusion. Patient reports recently finishing a round of chemo which has caused frequent diarrhea.    Upon assessment to right foot noted delayed healing chronic foot wound. Buttocks with incontinence associated dermatitis and scattered areas of partial and shallow full thickness skin loss.     Recommendations:   - Santyl ointment to right medial foot to provide enzymatic debridement to promote moist wound healing  - Triad ointment to buttocks to manage moisture and promote healing of moisture related skin loss.     Nursing and MD team notified. Orders placed. Waffle overlay currently in use. Nursing to maintain pressure injury prevention interventions. Wound care to continue to assist with pt prn.        01/18/23 1302        Altered Skin Integrity 12/23/22 1445 Right  medial;lateral Foot Blister(s)   Date First Assessed/Time First Assessed: 12/23/22 1445   Side: Right  Orientation: (c) medial;lateral  Location: Foot  Primary Wound Type: (c) Blister(s)   Wound Image    Dressing Appearance Dry;Intact;Clean   Drainage Amount None   Drainage Characteristics/Odor No odor   Appearance Yellow;Slough;Dry   Tissue loss description Full thickness   Periwound Area Dry;Pink;Scar tissue   Wound Edges Undefined   Wound Length (cm) 1 cm   Wound Width (cm) 1.2 cm   Wound Surface Area (cm^2) 1.2 cm^2   Care Cleansed with:;Antimicrobial agent   Dressing Applied;Silicone;Foam        Altered Skin Integrity 01/18/23 Buttocks Incontinence associated dermatitis   Date First Assessed: 01/18/23   Altered Skin Integrity Present on Admission: yes  Location: Buttocks  Is this injury device related?: No  Primary Wound Type: Incontinence associated dermatitis   Wound Image    Dressing Appearance Open to air   Drainage Amount Scant   Drainage Characteristics/Odor Serous;No odor   Appearance Pink;Red;Moist;Yellow   Tissue loss description Full thickness   Periwound Area Moist;Scar tissue;Pink;Redness;Excoriated   Wound Edges Open   Care   (cleansing cloths)       01/18/2023

## 2023-01-18 NOTE — ASSESSMENT & PLAN NOTE
Dysuria and suprapubic tenderness  UA with leukocytes +3 and nitrites  Previous positive urine culture with E Coli in 2021, sensitive to ceftriaxone   - IV Ceftriaxone 1g daily  - Urine cultures pending

## 2023-01-18 NOTE — ASSESSMENT & PLAN NOTE
Hypotensive from GI losses and sepsis secondary to UTI     - Hold home dose losartan 100mg PO daily

## 2023-01-18 NOTE — PLAN OF CARE
Sw met with patient at bedside.   Patient is alert and oriented with no communication barriers.  Prior to admission patient is independent. Patient uses wheel chair, oxygen, and shower chair at home.  Patient PCP is correct on face sheet. Patient pharmacy of choice is [facility].  Patient family will transport home at discharge.     01/18/23 1554   Discharge Assessment   Assessment Type Discharge Planning Assessment   Confirmed/corrected address, phone number and insurance Yes   Confirmed Demographics Correct on Facesheet   Source of Information patient   People in Home alone   Prior to hospitilization cognitive status: Alert/Oriented   Current cognitive status: Alert/Oriented   Equipment Currently Used at Home wheelchair;oxygen;shower chair   Readmission within 30 days? Yes   Patient currently being followed by outpatient case management? No   Do you currently have service(s) that help you manage your care at home? No   Do you take prescription medications? Yes   Do you have prescription coverage? Yes   Do you have any problems affording any of your prescribed medications? No   How do you get to doctors appointments? family or friend will provide;health plan transportation   Are you on dialysis? No   Do you take coumadin? No   Discharge Plan A Home   Physical Activity   On average, how many days per week do you engage in moderate to strenuous exercise (like a brisk walk)? 7 days   On average, how many minutes do you engage in exercise at this level? 40 min   Financial Resource Strain   How hard is it for you to pay for the very basics like food, housing, medical care, and heating? Not hard   Housing Stability   In the last 12 months, was there a time when you were not able to pay the mortgage or rent on time? N   In the last 12 months, was there a time when you did not have a steady place to sleep or slept in a shelter (including now)? N   Transportation Needs   In the past 12 months, has lack of  transportation kept you from medical appointments or from getting medications? no   In the past 12 months, has lack of transportation kept you from meetings, work, or from getting things needed for daily living? No   Food Insecurity   Within the past 12 months, you worried that your food would run out before you got the money to buy more. Never true   Within the past 12 months, the food you bought just didn't last and you didn't have money to get more. Never true   Stress   Do you feel stress - tense, restless, nervous, or anxious, or unable to sleep at night because your mind is troubled all the time - these days? Only a littl   Social Connections   In a typical week, how many times do you talk on the phone with family, friends, or neighbors? Three   How often do you get together with friends or relatives? Once   How often do you attend Jehovah's witness or Bahai services? Never   Do you belong to any clubs or organizations such as Jehovah's witness groups, unions, fraternal or athletic groups, or school groups? No   How often do you attend meetings of the clubs or organizations you belong to? Never   Are you , , , , never , or living with a partner? Never marrie   Alcohol Use   Q1: How often do you have a drink containing alcohol? 2-4 pr month   Q2: How many drinks containing alcohol do you have on a typical day when you are drinking? 1 or 2   Q3: How often do you have six or more drinks on one occasion? Never

## 2023-01-18 NOTE — CONSULTS
[facility] - Emergency Dept  Pulmonology  Consult Note    Patient Name: Nalini Varma  MRN: [id number]  Admission Date: 1/17/2023  Hospital Length of Stay: 1 days  Code Status: Full Code  Attending Physician: Paras Antonio MD  Primary Care Provider: Yane Sahni MD   Principal Problem: Pleural effusion    Inpatient consult to Pulmonology  Consult performed by: Jaqueline Sanders MD  Consult ordered by: Radhika Sunshine NP        Subjective:     HPI:  Ms. Varma is a 73 yo with PMH of COPD on home 02 (100py smoking hx), prior HFrEF with improved EF, recent hypovolemic shock in 12/2022, RIGHT lung adenocarcinoma diagnosed 2020, now with presumed recurrence on chemo/XRT. She presented to [facility] ED on 1/17/22 with diarrhea, weakness, burning with urination, and shortness of breath. Of note, she is on chemo with last dose 1/6/22, and recently stopped her loperamide. She follows with Dr Gilmore for Heme/Onc, last seen 1 week ago for telehealth.     In the ED she was placed on NC at 4L, given IVF and electrolyte replacements as she was noted to have metabolic acidosis, hypophosphatemia, hyponatremia, hypomagnesemia, and urine studies consistent with UTI. She was afebrile with no leukocytosis. Pulmonology was consulted to evaluate presence of pleural effusion. BNP was elevated.     On visit to bedside Ms. Varma reports she is feeling somewhat better than yesterday, no longer reporting shortness of breath above baseline, still with chronic cough and still with worsened diarrhea. She reports she has never had thoracentesis done. Also reports she is uncertain if she wishes to continue with chemo at this time given her side effects. Her is exam is notable for ability to speak in full sentences, hemodynamically stable with sp02 96% on 3L, intermittent productive cough, no wheezing or prolonged expiratory phase, notable 1-2+ pitting edema on exam.     Bedside US with no effusion on R, very small effusion on L.     Social  history: continues to smoke 1ppd, prior work as an   Surgical hx: s/p lung bx, eye surgery, joint replacement  Family hx: noncontributory    Prior studies:  Echo 2021, reduced EF to 38%, recovered in 2022 but with G2DD and mild PH  PFTs in 2020 show moderate obstruction, moderate restriction, and reduction in DLCO.   Chest imaging shows prior presence of L-sided pleural effusion, with intermittent bilateral effusions as far back as 2015  Biopsy of JASSI lung nodule was done by IR in 9/2022, resulting in PTX s/p chest tube placement. Unfortunately specimen was nondiagnostic.       Objective:     Vital Signs (Most Recent):  Temp: 98 °F (36.7 °C) (01/18/23 0733)  Pulse: 82 (01/18/23 0825)  Resp: 18 (01/18/23 0804)  BP: (!) 112/56 (01/18/23 0825)  SpO2: 100 % (01/18/23 0804)   Vital Signs (24h Range):  Temp:  [97.7 °F (36.5 °C)-98 °F (36.7 °C)] 98 °F (36.7 °C)  Pulse:  [] 82  Resp:  [16-25] 18  SpO2:  [97 %-100 %] 100 %  BP: ()/(43-56) 112/56     Weight: 96.2 kg (212 lb)  Body mass index is 34.22 kg/m².      Intake/Output Summary (Last 24 hours) at 1/18/2023 0940  Last data filed at 1/18/2023 0828  Gross per 24 hour   Intake 1655.86 ml   Output 100 ml   Net 1555.86 ml       Physical Exam  Constitutional:       General: She is not in acute distress.     Appearance: She is obese. She is ill-appearing. She is not toxic-appearing.   HENT:      Head: Atraumatic.      Nose: Congestion present.      Mouth/Throat:      Mouth: Mucous membranes are moist.   Eyes:      General: No scleral icterus.     Pupils: Pupils are equal, round, and reactive to light.   Cardiovascular:      Rate and Rhythm: Normal rate and regular rhythm.      Pulses: Normal pulses.      Heart sounds: No murmur heard.  Pulmonary:      Effort: Pulmonary effort is normal. No respiratory distress.      Breath sounds: No wheezing or rales.      Comments: Intermittent coughing, on 3-4L 02  Abdominal:      General: Abdomen is flat.       Palpations: Abdomen is soft.   Musculoskeletal:         General: Swelling present. No deformity. Normal range of motion.      Cervical back: Normal range of motion. No rigidity.      Right lower leg: Edema present.      Left lower leg: Edema present.   Skin:     General: Skin is warm.      Capillary Refill: Capillary refill takes less than 2 seconds.      Findings: Bruising present.   Neurological:      General: No focal deficit present.      Mental Status: She is alert and oriented to person, place, and time.   Psychiatric:         Mood and Affect: Mood normal.         Behavior: Behavior normal.     Review of Systems   Constitutional: Negative.    HENT:  Positive for congestion and rhinorrhea.    Eyes: Negative.    Respiratory:  Positive for cough and shortness of breath.    Cardiovascular:  Positive for leg swelling.   Gastrointestinal:  Positive for diarrhea.   Endocrine: Negative.    Genitourinary:  Positive for dysuria.   Musculoskeletal: Negative.    Skin: Negative.    Allergic/Immunologic: Positive for immunocompromised state.   Neurological: Negative.    Hematological: Negative.    Psychiatric/Behavioral: Negative.       Significant Labs:    CBC/Anemia Profile:  Recent Labs   Lab 01/17/23  1803 01/18/23  0434   WBC 9.00 8.04   HGB 8.4* 7.3*   HCT 25.4* 21.3*    192   MCV 97 96   RDW 17.1* 17.2*        Chemistries:  Recent Labs   Lab 01/17/23  1803 01/17/23  2223 01/18/23  0434 01/18/23  0440 01/18/23  0753   * 126* 127*  --  129*   K 3.6 3.2* 3.7  --  3.3*   CL 96 96 98  --  101   CO2 13* 17* 20*  --  21*   BUN 20 19 22  --  20   CREATININE 1.4 1.3 1.5*  --  1.2   CALCIUM 6.8* 6.3* 6.7*  --  6.1*   ALBUMIN 2.3* 1.8* 2.1*  --   --    PROT 5.6* 4.3* 4.7*  --   --    BILITOT 0.4 0.3 0.4  --   --    ALKPHOS 70 54* 63  --   --    ALT 19 15 14  --   --    AST 31 24 25  --   --    MG 0.9*  --   --  1.4* 1.8   PHOS 1.2*  --   --   --   --        All pertinent labs within the past 24 hours have been  reviewed.    Significant Imaging:  I have reviewed all pertinent imaging results/findings within the past 24 hours.    Assessment/Plan:     * Pleural effusion  Noted on imaging since 2015. History of HFrEF (now recovered EF) on home diuretics.   Evaluated on bedside US; effusion is very small without obvious target for thoracentesis. If effusion becomes larger, would suggest thoracentesis to assist with lung cancer staging as well as ruling out infection. However, given patient's reticence to continue with chemo, this may not be of utility.   -Would suggest re-initiating home diuresis as appears volume overloaded on exam.     Recurrent adenocarcinoma of left lung  Followed closely by Heme/Onc, lung adenocarcinoma dx 2020 stage 1 with presumed recurrence now on Chemo/XRT. Patient discussing some reticence to continue chemo, will defer this conversation to Heme/Onc.   Prior stage I, unknown current stage given difficulty obtaining repeat tissue.     Chronic obstructive pulmonary disease  COPD GOLD 2D with PFTs confirming dx in 2020. Does not appear to be in COPD exacerbation at this time, would continue home inhalers.   -continue triple therapy with LABA/LAMA/ICS  -on home 02, would wean support for goal 02 88-92%  -discussed importance of smoking cessation, patient reports she has tried multiple adjuncts without success, still would like to quit. Will continue to discuss.           Thank you for your consult. I will sign off. Please contact us if you have any additional questions.     Jaqueline Sanders MD  Pulmonology  [facility] - Emergency Dept

## 2023-01-18 NOTE — ED NOTES
1st contact with pt, appears to be sleeping, eyes closed, iv pump beeping, noted magnesium infusion complete, awakening up with verbal stimuli, a&ox3, in no distress, resp even non labored, pt falls back asleep when left undisturbed with snoring resp, noted on cardiac monitor, NSR on cardiac monitor, bp, pulse ox, sr up x2, call light with reach, noted 1000 ml NS infusing via pump at 75ml/hr, bedside commode, pt's owned  in room

## 2023-01-18 NOTE — ASSESSMENT & PLAN NOTE
"Patient presented with increased SOB from her baseline.  She still smokes 1ppd and on home O2 at 3L NC.  Likely from COPD exacerbation. CXR on admission with findings suggest edema noting left pleural effusion.  Increased parenchymal attenuation projects over the right upper lung zone, developing consolidation is not excluded noting the appearance is similar to previous exams.  Lactic Acid elevated at 2.3 and normalized.  .  Patient seen by Pulmonary and "The patient's working assessments include diarrhea, COPD, small L sided pleural effusion by POCUS.  Do not suspect the chronic effusion is operant in her current admit and do not advise tap.  Continue home COPD regimen".    Plan:  Continue with O2 at tolerated to keep SpO2 >92%   Continue with Duonebs Q4 wake  Encourage smoking cessation  Hold home meds torsemide 20mg PO daily and losartan 100mg PO daily  Strict intake and output  Cardiac diet instead of low sodium diet   "

## 2023-01-18 NOTE — ASSESSMENT & PLAN NOTE
Patient has persistent depression which is unknown and is currently controlled. Will Continue anti-depressant medications. We will not consult psychiatry at this time. Patient does not display psychosis at this time. Continue to monitor closely and adjust plan of care as needed.    - As above

## 2023-01-18 NOTE — ASSESSMENT & PLAN NOTE
Patient denies need for nicotine patches. Wants to quit cold turkey.    - Encourage smoking cessation

## 2023-01-18 NOTE — ED NOTES
Pt assisted up to bedside commode to urinate. Pt was incontinent of some stool in bed. Cleaned, fresh diaper applied. Pt has some scratches to her right buttock and some breakdown to her lower buttocks. Mepilex dressing applied. Pt also has healing wound to her right foot. Mepilex dressing applied to that site as well. Waffle mattress applied to bed and pt repositioned in bed for comfort.     Pulm MD at bedside to assess patient.

## 2023-01-18 NOTE — ASSESSMENT & PLAN NOTE
Patient is identified as having Combined Systolic and Diastolic heart failure that is Acute on chronic. CHF is currently controlled and uncontrolled due to Pulmonary edema/pleural effusion on CXR. Latest ECHO performed and demonstrates- Results for orders placed during the hospital encounter of 12/23/22    Echo    Interpretation Summary  · The left ventricle is normal in size with concentric remodeling and normal systolic function.  · Mild left atrial enlargement.  · Grade II left ventricular diastolic dysfunction.  · Normal right ventricular size with normal right ventricular systolic function.  · There is moderate aortic valve stenosis.  · Mild mitral regurgitation.  · Mild tricuspid regurgitation.  · There is mild pulmonary hypertension.  . Continue Beta Blocker, ACE/ARB and Furosemide and monitor clinical status closely. Monitor on telemetry. Patient is off CHF pathway.  Monitor strict Is&Os and daily weights.  Place on fluid restriction of 1 L. Continue to stress to patient importance of self efficacy and  on diet for CHF. Last BNP reviewed- and noted below   Recent Labs   Lab 01/17/23  2223   *   .  Plan:  As above

## 2023-01-18 NOTE — ED NOTES
LOC: The patient is awake, alert, and oriented to self, place, time, and situation. Pt is calm and cooperative. Affect is appropriate.  Speech is appropriate and clear.     APPEARANCE: Patient resting comfortably in no acute distress.      SKIN: The skin is warm and dry; color consistent with ethnicity.  Patient has sluggish skin turgor and dry mucus membranes.  Skin intact; no breakdown or bruising noted.     MUSCULOSKELETAL: Patient moving upper and lower extremities with moderate difficulty; reports pain in the extremities and back.  Reports weakness.     RESPIRATORY: Airway is open and patent. Respirations spontaneous, even, easy, and non-labored.   Patient arrives Patient has a normal effort and rate.  No accessory muscle use noted. Denies cough. Report feeling SOB.     CARDIAC:  Normal rate noted.  Peripheral edema noted. No complaints of chest pain.      ABDOMEN: Soft and non tender to palpation.  No distention noted. Pt denies abdominal pain; denies nausea, vomiting, or constipation. Reports diarrhea.     NEUROLOGIC: Eyes open spontaneously.  Behavior appropriate to situation.  Follows commands; facial expression symmetrical.  Purposeful motor response noted; normal sensation in all extremities. Pt denies headache; denies lightheadedness or dizziness; denies visual disturbances; denies loss of balance; denies unilateral weakness.

## 2023-01-18 NOTE — ASSESSMENT & PLAN NOTE
Hyponatremia, hypokalemia, hypomagnesemia, hypocalcemia, hypoalbuminemia  Nephrology consulted, appreciate input. Urine osm, Na, Cr, uric acid, serum urice acid, TSH, BNP checked.     Plan:  - PO KCl 20 mEq TID   - IV Mag sulfate 2g replacement   - Consult dietitian for hypoalbuminemia   - Cardiac diet with 1L fluid restriction  - Q4 BMP with mag and phos  - Cardiac monitoring  - Strict intake and output  - IV NaCl 75ml/hr

## 2023-01-18 NOTE — ASSESSMENT & PLAN NOTE
Noted on imaging since 2015. History of HFrEF (now recovered EF) on home diuretics.   Evaluated on bedside US; effusion is very small without obvious target for thoracentesis. If effusion becomes larger, would suggest thoracentesis to assist with lung cancer staging as well as ruling out infection. However, given patient's reticence to continue with chemo, this may not be of utility.   -Would suggest re-initiating home diuresis as appears volume overloaded on exam.

## 2023-01-18 NOTE — ASSESSMENT & PLAN NOTE
Patient denies need for nicotine patches. Wants to quit cold turkey.  -Encourage smoking cessation

## 2023-01-18 NOTE — ASSESSMENT & PLAN NOTE
Patient with Hypercapnic and Hypoxic Respiratory failure which is Acute on chronic.  she is on home oxygen at 3 LPM. Supplemental oxygen was provided and noted-  .   Signs/symptoms of respiratory failure include- tachypnea, increased work of breathing, respiratory distress, wheezing and lethargy. Contributing diagnoses includes - CHF, COPD, Obesity Hypoventilation and Pleural effusion Labs and images were reviewed. Patient Has not had a recent ABG.     Acute respiratory distress likely due to pleural effusion with some COPD exacerbation. Less likely to suspect ongoing infective process given no purulent sputum. Tend to favor patient being dry than wet, given elevated H/H and JONATHAN although patient has elevated BNP and pleural effusion, but with a b/g of lung Ca and hypoalbuminemia.     Plan:  - O2 at tolerated to keep SpO2 >92%   - Pulmonology consult for left pleural effusion  - Nader Q4 wake  - Encourage smoking cessation  - Hold home meds torsemide 20mg PO daily and losartan 100mg PO daily  - Strict intake and output  - Cardiac diet instead of low sodium diet

## 2023-01-18 NOTE — ASSESSMENT & PLAN NOTE
Followed closely by Heme/Onc, lung adenocarcinoma dx 2020 stage 1 with presumed recurrence now on Chemo/XRT. Patient discussing some reticence to continue chemo, will defer this conversation to Heme/Onc.   Prior stage I, unknown current stage given difficulty obtaining repeat tissue.

## 2023-01-18 NOTE — ASSESSMENT & PLAN NOTE
Hx of anxiety, MDD.  Chronic. Controlled.    - Alprazolam 0.25mg PRN  - Resume home meds PO duloxetine 60mg daily, trazodone 100mg qhs PRN     Intact

## 2023-01-18 NOTE — HPI
Nalini Varma is a 72 year old female with PMHx of COPD (on home O2 3L) with 53 pack year smoking hx, current smoker, lung CA (currently on radiation and chemo) chronic anemia, anxiety, CKD Stage 3, systolic and diastolic heart failure (EF 55%, Grade II diastolic dysfunction, 12/24/22) presenting to ER for hypotension and increased shortness of breath for 6 days. Patient reports non-bloody diarrhea for 6 days, associated with loss of appetite, nausea, non-bloody vomiting, lethargy, chills. Patient also reports increased SOB but denies purulent sputum, sore throat or fever. Patient denies orthopnea or bilateral lower extremity swelling. Patient also reports dysuria and suprapubic tenderness.     H/H 8.4/25.4 elevated from baseline 7.7/23.3, likely due to hypovolemia. No leukocytosis but elevated lactate 2.3. UA with nitrites and +3 leukocytes. Hyponatremia 127 (baseline 133), hypocalcemia 8.2 (corrected for hypoalbuminemia 1.2). Cr elevated 1.4 from baseline 1.0, and an elevated AG 18. CXR with findings of  edema noting left pleural effusion. BNP elevated at 139. Patient was given IV calcium gluconate 1g, IV Mag sulfate 2g and IV NaCl 0.9% 1.5L in ER. Nephrology and Pulmonology was consulted. Patient was started on IV Ceftriaxone 1g for symptomatic UTI.    Patient is admitted to inpatient service with Hospital Medicine for management of electrolyte abnormalities, left pleural effusion and symptomatic UTI.

## 2023-01-18 NOTE — SUBJECTIVE & OBJECTIVE
Interval History: Patient is awake and alert this morning.  She is overall feeling slightly better.  Only one episode of diarrhea last night.  No cough or SOB.  No significant abdominal pain.      Review of Systems   Constitutional:  Positive for activity change, appetite change (loss of appetite for 1 week) and fatigue. Negative for chills and fever.   HENT:  Negative for congestion, ear pain and sore throat.    Eyes:  Negative for pain and redness.   Respiratory:  Positive for cough (chronic, wet, clear sputum) and shortness of breath (stable on 3L O2NC at home).    Cardiovascular:  Negative for chest pain and leg swelling.   Gastrointestinal:  Positive for diarrhea (non-bloody), nausea and vomiting.   Genitourinary:  Negative for dysuria and flank pain.        Suprapubic pain   Musculoskeletal:  Positive for myalgias (worse in bilateral lower extremities). Negative for neck pain.   Skin:  Negative for rash and wound.   Neurological:  Negative for weakness, numbness and headaches.   Psychiatric/Behavioral:  Negative for agitation and behavioral problems. The patient is nervous/anxious.    Objective:     Vital Signs (Most Recent):  Temp: 98 °F (36.7 °C) (01/18/23 1445)  Pulse: 85 (01/18/23 1538)  Resp: (!) 22 (01/18/23 1303)  BP: 112/69 (01/18/23 1303)  SpO2: 96 % (01/18/23 1303)   Vital Signs (24h Range):  Temp:  [97.7 °F (36.5 °C)-98.2 °F (36.8 °C)] 98 °F (36.7 °C)  Pulse:  [] 85  Resp:  [16-25] 22  SpO2:  [92 %-100 %] 96 %  BP: ()/(43-69) 112/69     Weight: 96.2 kg (212 lb)  Body mass index is 34.22 kg/m².    Intake/Output Summary (Last 24 hours) at 1/18/2023 1618  Last data filed at 1/18/2023 0828  Gross per 24 hour   Intake 1655.86 ml   Output 100 ml   Net 1555.86 ml      Physical Exam  Vitals and nursing note reviewed.   Constitutional:       General: She is not in acute distress.     Appearance: She is obese. She is ill-appearing (chronically).   HENT:      Head: Normocephalic and atraumatic.       Right Ear: External ear normal.      Left Ear: External ear normal.      Nose: No congestion or rhinorrhea.      Mouth/Throat:      Mouth: Mucous membranes are moist.      Pharynx: Oropharynx is clear.   Eyes:      General: No scleral icterus.        Right eye: No discharge.         Left eye: No discharge.      Pupils: Pupils are equal, round, and reactive to light.   Cardiovascular:      Rate and Rhythm: Normal rate and regular rhythm.      Pulses: Normal pulses.      Heart sounds: Normal heart sounds.   Pulmonary:      Breath sounds: Wheezing (expiratory wheezes) present.      Comments: On 4L O2 NC  Abdominal:      General: Bowel sounds are normal. There is no distension.      Tenderness: There is no abdominal tenderness.   Musculoskeletal:      Cervical back: Normal range of motion.      Right lower leg: Edema (trace edema) present.      Left lower leg: Edema (trace edema) present.   Skin:     General: Skin is warm and dry.   Neurological:      General: No focal deficit present.      Mental Status: She is alert and oriented to person, place, and time. Mental status is at baseline.   Psychiatric:         Mood and Affect: Mood normal.         Behavior: Behavior normal.       Significant Labs: All pertinent labs within the past 24 hours have been reviewed.    Significant Imaging: I have reviewed all pertinent imaging results/findings within the past 24 hours.

## 2023-01-18 NOTE — CONSULTS
"Consult Note  Nephrology    Consult Requested By: Paras Antonio MD  Reason for Consult: multiple electrolyte abnormalities  SUBJECTIVE:     History of Present Illness:  Patient is a 72 y.o. female presents with 7-10 days of worsening diarrhea. Pt usually has loose stools and takes imodium which usually controls symptoms. About 10 days ago this stopped working and her stools became liquid and large volume despite imodium use. She also had not been able to tolerate any diet due to nausea and vomiting for the same duration. She tried drinking Gatorade , but " this made me more nauseous." She then drank some soda with electrolytes, but no much of this. I am seeing her in the ED and she tells me that  she she has a hx of SIADH. She also has a history of chronic hypocalcemia and hypophosphatemia related to Prolia injections that she takes for osteoporosis, and she appears to still be on this medication. She has already received 1.5L NS and calcium replacement with calcium gluconate  and magnesium replacement with MagSO4 in ED. She was also started on empiric Rocephin in D5 carrier solution 50mL. In additional to the above GI issues she does also endorse some increase MEDELLIN/ SOB, over last week. She denies cough, fever chills, CP , Palps. She has had to increase her home O2 from 3L to 4L to help with this issue. CXR today shows small pleural effusions.    She is also on chemotherapy for her lung cancer with last treatment 1/6/2023 Taxol/ Carboplatin    Currently her nausea is improved and she is attempting to eat lunch.    Past Medical History:   Diagnosis Date    Anxiety     Cervical spinal stenosis     Choledocholithiasis     Chronic obstructive pulmonary disease 03/16/2016    Degenerative disc disease of cervical spine     Diverticulosis 04/24/2018    History of alcohol abuse 03/02/2021    History of gastric ulcer     History of L3 compression fracture 12/19/2018    History of lung cancer     s/p completion of XRT in " 10/2020    Hyperlipidemia     Iron deficiency anemia     Osteoarthritis     Stage III CKD 2017     Past Surgical History:   Procedure Laterality Date    BREAST CYST EXCISION Right     1967    CATARACT EXTRACTION      COLONOSCOPY  2015    COLONOSCOPY N/A 6/8/2022    Procedure: COLONOSCOPY;  Surgeon: Helio Cheema MD;  Location: [facility] (2ND FLR);  Service: Endoscopy;  Laterality: N/A;  5/25-Pt requesting Dr. Cheema-approval given per Dr. Cheema-ml  Fully vaccinated, prep instr portal -ml    CORONARY ANGIOGRAPHY N/A 7/20/2018    Procedure: ANGIOGRAM, CORONARY ARTERY;  Surgeon: Willard Roberts MD;  Location: [facility] CATH LAB;  Service: Cardiovascular;  Laterality: N/A;    ENDOSCOPIC ULTRASOUND OF UPPER GASTROINTESTINAL TRACT N/A 3/24/2021    Procedure: ULTRASOUND, UPPER GI TRACT, ENDOSCOPIC;  Surgeon: Helio Cheema MD;  [facility]  Service: Endoscopy;  Laterality: N/A;    ENDOSCOPIC ULTRASOUND OF UPPER GASTROINTESTINAL TRACT N/A 4/25/2022    Procedure: ULTRASOUND, UPPER GI TRACT, ENDOSCOPIC;  Surgeon: Helio Cheema MD;  [facility]  Service: Endoscopy;  Laterality: N/A;  cardiac risk assessment 1 per Dr. Ortez. see t/e 3/17-SC  3/25:new instructions via portal. home with medical transport?-SC    ERCP N/A 3/24/2021    Procedure: ERCP (ENDOSCOPIC RETROGRADE CHOLANGIOPANCREATOGRAPHY);  Surgeon: Helio Chemea MD;  Location: [facility] (2ND FLR);  Service: Endoscopy;  Laterality: N/A;    ERCP N/A 4/30/2021    Procedure: ERCP (ENDOSCOPIC RETROGRADE CHOLANGIOPANCREATOGRAPHY);  Surgeon: Boo Gray MD;  Location: [facility] (2ND FLR);  Service: Endoscopy;  Laterality: N/A;    ERCP N/A 7/1/2021    Procedure: ERCP (ENDOSCOPIC RETROGRADE CHOLANGIOPANCREATOGRAPHY);  Surgeon: Helio Cheema MD;  Location: [facility] ENDO (2ND FLR);  Service: Endoscopy;  Laterality: N/A;  Ok for Taxi. Dr Cheema  rapid covid 1130am- tb inst email    ESOPHAGOGASTRODUODENOSCOPY  2015     ESOPHAGOGASTRODUODENOSCOPY N/A 3/4/2021    Procedure: EGD (ESOPHAGOGASTRODUODENOSCOPY);  Surgeon: Yenifer Ramirez MD;  Location: [facility];  Service: Endoscopy;  Laterality: N/A;    ESOPHAGOGASTRODUODENOSCOPY N/A 3/24/2021    Procedure: EGD (ESOPHAGOGASTRODUODENOSCOPY);  Surgeon: Helio Cheema MD;  [facility]  Service: Endoscopy;  Laterality: N/A;    EYE SURGERY  2016    Cataracts    FRACTURE SURGERY  2014    JOINT REPLACEMENT  2007    ORIF FEMUR FRACTURE Left     TOTAL KNEE ARTHROPLASTY Left 2007    TUBAL LIGATION       Family History   Problem Relation Age of Onset    Hypertension Mother     Alzheimer's disease Mother     Dementia Mother     Depression Mother         Alzheimers    Myasthenia gravis Father     Arthritis Father         Myasthenia Gravis    Rectal cancer Father     Hypertension Brother     Arthritis Brother         Colon cancer, obese, high blood pressure    Colon cancer Brother     No Known Problems Son     Cancer Sister         Brain cancer    Miscarriages / Stillbirths Sister     Melanoma Neg Hx     Breast cancer Neg Hx      Social History     Tobacco Use    Smoking status: Some Days     Packs/day: 1.00     Years: 53.00     Pack years: 53.00     Types: Cigarettes     Start date: 4/30/1967    Smokeless tobacco: Never    Tobacco comments:     I had quit for about 6 months this past year. Then massive stress and started back up sometimes   Substance Use Topics    Alcohol use: Yes     Alcohol/week: 7.0 standard drinks     Types: 7 Drinks containing 0.5 oz of alcohol per week     Comment: at least 1 cocktail a day    Drug use: No       (Not in a hospital admission)      Review of patient's allergies indicates:   Allergen Reactions    Wellbutrin [bupropion hcl] Other (See Comments)     Hyponatremia and hypomagnesia     Zoloft [sertraline] Other (See Comments)     Hyponatremia and hypomagnesia     Bananas [banana]      Emesis, and stomach cramps    Patient states she is not  allergic to Banana        Review of Systems:  Constitutional: No fever or chills, no weight changes.  Eyes: No visual changes or photophobia  HEENT: No nasal congestion or sore throat  Respiratory: No cough or shorness of breath  Cardiovascular: No chest pain or palpitations  Gastrointestinal: Good appetite, no nausea or vomiting, no change in bowel habits  Genitourinary: No hematuria or dysuria  Skin: No rash or pruritis  Hematologic/lymphatic: No easy bruising, bleeding or lymphadenopathy  Musculoskeletal: No arthralgias or myalgias  Neurological: No seizures or tremors  Endocrine: No heat/cold intolerance.  No polyuria/polydipsia.  Psychiatric:  No depression or anxiety.     OBJECTIVE:     Vital Signs (Most Recent)  Temp: 98.2 °F (36.8 °C) (01/18/23 1056)  Pulse: 80 (01/18/23 1056)  Resp: 18 (01/18/23 1056)  BP: (!) 121/56 (01/18/23 1056)  SpO2: 99 % (01/18/23 1056)    Vital Signs Range (Last 24H):  Temp:  [97.7 °F (36.5 °C)-98.2 °F (36.8 °C)]   Pulse:  []   Resp:  [16-25]   BP: ()/(43-56)   SpO2:  [97 %-100 %]     Physical Exam:    General appearance: Well developed, well nourished  HEENT: NC in place  Lungs: Clear to auscultation bilaterally and normal respiratory effort  Heart: Regular rate and rhythm, S1, S2 normal, no murmur, click, rub or gallop  Abdomen: Soft, non-tender non-distended; bowel sounds normal  Extremities: No cyanosis or clubbing. Trace bilateral LE edema  Pulses: 2+ and symmetric  Psychiatric:  Alert and oriented times 3.  Affect appropriate.     Diagnostic Results:  Labs: Reviewed  X-Ray: Reviewed    ASSESSMENT/PLAN:   Acute kidney injury/ acidosis  -Baseline cr 0.9 -1.1  -admit Cr 1.4> 1.5 this am  -suspect due to volume status, UTI and profuse diarrhea.  -Urine culture sent and on rocephin  -Given IVFs with NS  -recheck chem, urine lytes, Eosin, uric acid and cpk.  -Repeat CT abd/ pelvis w/o to R/O obstruction or intrinsic structural abnormality given that abnormalities on last  CT.  -Acidosis now non-AG and likely due to diarrhea  -Will need to take caution in correcting acidosis due to Na.    Acute on Chronic Hyponatremia   -Underlying SIADH by history with chronic Na level in low 130s  -On Torsemide at home  -She appears to be clinically volume expanded although this could be due to her poor nutriotnal state and underlying cancer.  -BNP is not markedly elevated but can be inaccurate in obesity.  -Given 1.5L NS in ED  -Check urine lytes, urine Osm, (although these may not be accurate since she has already received IVFs).  -In addition her chemo can cause hyponatremia as well as her volume status.  -recheck renal function panel and mag now     Hypokalemia  -Given KCL 40 meq po  -Also can be a side effect of her chemo and diarrhea  -repeat K 5.0    Acute on chronic Hypocalcemia/ Hypophosphatemia  -Given calcium glucanate  2g in ED  -Is on Prolia but will see when her last dose was.  -Also can be a side effect of her chemo and diarrhea  -replace prn and continue oral Calcium and Vit D as well.    Hypomagnesemia  -Given Magnesium SO4 4 g  -Seem to require multiple chronic infusion replacements.  -Also can be a side effect of her chemo and diarrhea  -On PPI as well.  -replace prn    Malnutrition  -Alb 1.8-2.1    Hyperuricema  -high uric acid could be from volume status, diuretics, tumor burden.  -repeat after volume.  -May need Allopurinol.    Bilateral Pleural Effusions/COPD/Right lung adenocarcinoma with recurrence   -per Pulmonary with bedside US no effusion on R, very small effusion on L.  -on home O2 2LNC titrate to O2 sat 88-02% on triple therapy inh  -on chemo with Taxol/ Carboplatin last infusion 1/6/23.  -Considering stopping.    Thanks for consultation, will follow along with you.   High complexity MDM because: JONATHAN acidosis, SIADH, multiple electrolyte abnormalities, Lung cancer diarrhea, N/V  -Chronic illness with SEVERE exacerbation, progression, or side effects of  treatment.  -Acute or chronic illness or injury that poses a threat to life or bodily function  -Need for emergent dialysis  -Drug therapy requiring intensive monitoring for toxicity  -Chronic illness with MILD exacerbation, progression, or side effects of treatment  -2 or more stable chronic illnesss  -Acute illness with systemic symptoms  -Prescription drug management  -Discussion with family

## 2023-01-18 NOTE — ED NOTES
No response from Dr. Antonio to my secure chat notifying md of calcium level of 6.1    At this time,message left on voice mail of Dr. Paras Antonio, calcium level 6.1

## 2023-01-18 NOTE — PROGRESS NOTES
[facility] Medicine  Progress Note    Patient Name: Nalini Varma  MRN: [id number]  Patient Class: IP- Inpatient   Admission Date: 1/17/2023  Length of Stay: 1 days  Attending Physician: Paras Antonio MD  Primary Care Provider: Yane Sahni MD        Subjective:     Principal Problem:Hypovolemia dehydration        HPI:  Nalini Varma is a 72 year old female with PMHx of COPD (on home O2 3L) with 53 pack year smoking hx, current smoker, lung CA (currently on radiation and chemo) chronic anemia, anxiety, CKD Stage 3, systolic and diastolic heart failure (EF 55%, Grade II diastolic dysfunction, 12/24/22) presenting to ER for hypotension and increased shortness of breath for 6 days. Patient reports non-bloody diarrhea for 6 days, associated with loss of appetite, nausea, non-bloody vomiting, lethargy, chills. Patient also reports increased SOB but denies purulent sputum, sore throat or fever. Patient denies orthopnea or bilateral lower extremity swelling. Patient also reports dysuria and suprapubic tenderness.     H/H 8.4/25.4 elevated from baseline 7.7/23.3, likely due to hypovolemia. No leukocytosis but elevated lactate 2.3. UA with nitrites and +3 leukocytes. Hyponatremia 127 (baseline 133), hypocalcemia 8.2 (corrected for hypoalbuminemia 1.2). Cr elevated 1.4 from baseline 1.0, and an elevated AG 18. CXR with findings of  edema noting left pleural effusion. BNP elevated at 139. Patient was given IV calcium gluconate 1g, IV Mag sulfate 2g and IV NaCl 0.9% 1.5L in ER. Nephrology and Pulmonology was consulted. Patient was started on IV Ceftriaxone 1g for symptomatic UTI.    Patient is admitted to inpatient service with Hospital Medicine for management of electrolyte abnormalities, left pleural effusion and symptomatic UTI.       Overview/Hospital Course:  See below      Interval History: Patient is awake and alert this morning.  She is overall feeling slightly better.  Only one  episode of diarrhea last night.  No cough or SOB.  No significant abdominal pain.      Review of Systems   Constitutional:  Positive for activity change, appetite change (loss of appetite for 1 week) and fatigue. Negative for chills and fever.   HENT:  Negative for congestion, ear pain and sore throat.    Eyes:  Negative for pain and redness.   Respiratory:  Positive for cough (chronic, wet, clear sputum) and shortness of breath (stable on 3L O2NC at home).    Cardiovascular:  Negative for chest pain and leg swelling.   Gastrointestinal:  Positive for diarrhea (non-bloody), nausea and vomiting.   Genitourinary:  Negative for dysuria and flank pain.        Suprapubic pain   Musculoskeletal:  Positive for myalgias (worse in bilateral lower extremities). Negative for neck pain.   Skin:  Negative for rash and wound.   Neurological:  Negative for weakness, numbness and headaches.   Psychiatric/Behavioral:  Negative for agitation and behavioral problems. The patient is nervous/anxious.    Objective:     Vital Signs (Most Recent):  Temp: 98 °F (36.7 °C) (01/18/23 1445)  Pulse: 85 (01/18/23 1538)  Resp: (!) 22 (01/18/23 1303)  BP: 112/69 (01/18/23 1303)  SpO2: 96 % (01/18/23 1303)   Vital Signs (24h Range):  Temp:  [97.7 °F (36.5 °C)-98.2 °F (36.8 °C)] 98 °F (36.7 °C)  Pulse:  [] 85  Resp:  [16-25] 22  SpO2:  [92 %-100 %] 96 %  BP: ()/(43-69) 112/69     Weight: 96.2 kg (212 lb)  Body mass index is 34.22 kg/m².    Intake/Output Summary (Last 24 hours) at 1/18/2023 1618  Last data filed at 1/18/2023 0828  Gross per 24 hour   Intake 1655.86 ml   Output 100 ml   Net 1555.86 ml      Physical Exam  Vitals and nursing note reviewed.   Constitutional:       General: She is not in acute distress.     Appearance: She is obese. She is ill-appearing (chronically).   HENT:      Head: Normocephalic and atraumatic.      Right Ear: External ear normal.      Left Ear: External ear normal.      Nose: No congestion or rhinorrhea.       Mouth/Throat:      Mouth: Mucous membranes are moist.      Pharynx: Oropharynx is clear.   Eyes:      General: No scleral icterus.        Right eye: No discharge.         Left eye: No discharge.      Pupils: Pupils are equal, round, and reactive to light.   Cardiovascular:      Rate and Rhythm: Normal rate and regular rhythm.      Pulses: Normal pulses.      Heart sounds: Normal heart sounds.   Pulmonary:      Breath sounds: Wheezing (expiratory wheezes) present.      Comments: On 4L O2 NC  Abdominal:      General: Bowel sounds are normal. There is no distension.      Tenderness: There is no abdominal tenderness.   Musculoskeletal:      Cervical back: Normal range of motion.      Right lower leg: Edema (trace edema) present.      Left lower leg: Edema (trace edema) present.   Skin:     General: Skin is warm and dry.   Neurological:      General: No focal deficit present.      Mental Status: She is alert and oriented to person, place, and time. Mental status is at baseline.   Psychiatric:         Mood and Affect: Mood normal.         Behavior: Behavior normal.       Significant Labs: All pertinent labs within the past 24 hours have been reviewed.    Significant Imaging: I have reviewed all pertinent imaging results/findings within the past 24 hours.      Assessment/Plan:      * Hypovolemia dehydration  Patient reports non-bloody diarrhea for 6 days, associated with loss of appetite, nausea, non-bloody vomiting, lethargy, chills.  Her last chemotherapy was on 1/6/2023.  Creatinine was 1.4 on admission with significant electrolyte abnormalities, including Calcium, Magnesium, Phos.  Uric Acid was 13.2.  No significant diarrhea while in the ED.  Recent stool studies on 12/25/2023 negative.  Given 1.5L NS in ED.  -C Diff ordered  -Continue with IV fluids  -Replaced electrolytes          Urinary tract infection without hematuria  Dysuria and suprapubic tenderness  UA with leukocytes +3 and nitrites  Previous positive  "urine culture with E Coli in 2021, sensitive to ceftriaxone   - IV Ceftriaxone 1g daily  - Urine cultures pending    Acute renal failure superimposed on stage 3 chronic kidney disease  Elevated Cr 1.4 on a baseline of 1.0.   Likely due to hypovolemia from GI losses  Nephrology on board  - IVF 75ml/hr NaCl 0.9%  - Trend BMP  - Avoid nephrotoxins, renally dose medication    Syndrome of inappropriate secretion of antidiuretic hormone  Na 127 and stable  -Monitor      Acute on chronic respiratory failure with hypoxia and hypercapnia  Patient presented with increased SOB from her baseline.  She still smokes 1ppd and on home O2 at 3L NC.  Likely from COPD exacerbation. CXR on admission with findings suggest edema noting left pleural effusion.  Increased parenchymal attenuation projects over the right upper lung zone, developing consolidation is not excluded noting the appearance is similar to previous exams.  Lactic Acid elevated at 2.3 and normalized.  .  Patient seen by Pulmonary and "The patient's working assessments include diarrhea, COPD, small L sided pleural effusion by POCUS.  Do not suspect the chronic effusion is operant in her current admit and do not advise tap.  Continue home COPD regimen".    Plan:  Continue with O2 at tolerated to keep SpO2 >92%   Continue with Duonebs Q4 wake  Encourage smoking cessation  Hold home meds torsemide 20mg PO daily and losartan 100mg PO daily  Strict intake and output  Cardiac diet instead of low sodium diet     Chronic obstructive pulmonary disease  As above      Chronic combined systolic and diastolic CHF (congestive heart failure)  TTE in 12/2022 with EF of 55% and Grade II left ventricular diastolic dysfunction.  .    -See above      Electrolyte abnormality  Hyponatremia, hypokalemia, hypomagnesemia, hypocalcemia, hypoalbuminemia  Nephrology consulted, appreciate input. Urine osm, Na, Cr, uric acid, serum urice acid, TSH, BNP checked.     Plan:  - PO KCl 20 " mEq TID   - IV Mag sulfate 2g replacement   - Consult dietitian for hypoalbuminemia   - Cardiac diet with 1L fluid restriction  - Q4 BMP with mag and phos  - Cardiac monitoring  - Strict intake and output  - IV NaCl 75ml/hr     Advance care planning  Patient wants to continue full code, with chest compressions and mechanical ventilation.      Recurrent adenocarcinoma of left lung  Patient is currently on chemo (last on 1/6/23) and radiation  Reports missing radiation the whole of last week      MDD (major depressive disorder), recurrent severe, without psychosis  Patient has persistent depression which is unknown and is currently controlled. Will Continue anti-depressant medications. We will not consult psychiatry at this time. Patient does not display psychosis at this time. Continue to monitor closely and adjust plan of care as needed.    - As above        Hypocalcemia  As above      Hyponatremia  As above      Hypomagnesemia  As above      Generalized anxiety disorder  Hx of anxiety, MDD.  Chronic. Controlled.  - Alprazolam 0.25mg PRN  - Resume home meds PO duloxetine 60mg daily, trazodone 100mg qhs PRN      Tobacco dependence  Patient denies need for nicotine patches. Wants to quit cold turkey.  -Encourage smoking cessation      Essential hypertension  Hypotensive from GI losses and sepsis secondary to UTI   -Hold home dose losartan 100mg PO daily      VTE Risk Mitigation (From admission, onward)         Ordered     enoxaparin injection 40 mg  Daily         01/17/23 2236     IP VTE HIGH RISK PATIENT  Once         01/17/23 2236     Place sequential compression device  Until discontinued         01/17/23 2236                Discharge Planning   FELICIANO:      Code Status: Full Code   Is the patient medically ready for discharge?:     Reason for patient still in hospital (select all that apply): Patient trending condition, Treatment and Consult recommendations  Discharge Plan A: Home                  Paras Antonio,  MD  Department of Hospital Medicine   [facility] - [facility] ([place])

## 2023-01-18 NOTE — ASSESSMENT & PLAN NOTE
Patient is currently on chemo (last on 1/6/23) and radiation  Reports missing radiation the whole of last week    - Touch base with rad onc

## 2023-01-19 ENCOUNTER — PATIENT OUTREACH (OUTPATIENT)
Dept: ADMINISTRATIVE | Facility: OTHER | Age: 73
End: 2023-01-19
Payer: MEDICARE

## 2023-01-19 LAB
ALBUMIN SERPL BCP-MCNC: 1.8 G/DL (ref 3.5–5.2)
ALBUMIN SERPL BCP-MCNC: 2 G/DL (ref 3.5–5.2)
ALP SERPL-CCNC: 76 U/L (ref 55–135)
ALT SERPL W/O P-5'-P-CCNC: 14 U/L (ref 10–44)
ANION GAP SERPL CALC-SCNC: 6 MMOL/L (ref 8–16)
ANION GAP SERPL CALC-SCNC: 6 MMOL/L (ref 8–16)
AST SERPL-CCNC: 19 U/L (ref 10–40)
BILIRUB SERPL-MCNC: 0.3 MG/DL (ref 0.1–1)
BUN SERPL-MCNC: 23 MG/DL (ref 8–23)
BUN SERPL-MCNC: 23 MG/DL (ref 8–23)
CA-I BLDV-SCNC: 0.99 MMOL/L (ref 1.06–1.42)
CALCIUM SERPL-MCNC: 7.3 MG/DL (ref 8.7–10.5)
CALCIUM SERPL-MCNC: 7.3 MG/DL (ref 8.7–10.5)
CHLORIDE SERPL-SCNC: 100 MMOL/L (ref 95–110)
CHLORIDE SERPL-SCNC: 102 MMOL/L (ref 95–110)
CK SERPL-CCNC: 37 U/L (ref 20–180)
CO2 SERPL-SCNC: 22 MMOL/L (ref 23–29)
CO2 SERPL-SCNC: 23 MMOL/L (ref 23–29)
CORTIS SERPL-MCNC: 6.6 UG/DL
CREAT SERPL-MCNC: 1 MG/DL (ref 0.5–1.4)
CREAT SERPL-MCNC: 1.1 MG/DL (ref 0.5–1.4)
CREAT UR-MCNC: 97.2 MG/DL (ref 15–325)
EOSINOPHIL URNS QL WRIGHT STN: NORMAL
EST. GFR  (NO RACE VARIABLE): 53 ML/MIN/1.73 M^2
EST. GFR  (NO RACE VARIABLE): 59 ML/MIN/1.73 M^2
GLUCOSE SERPL-MCNC: 129 MG/DL (ref 70–110)
GLUCOSE SERPL-MCNC: 130 MG/DL (ref 70–110)
LIPASE SERPL-CCNC: 71 U/L (ref 4–60)
MAGNESIUM SERPL-MCNC: 1.6 MG/DL (ref 1.6–2.6)
MAGNESIUM SERPL-MCNC: 1.6 MG/DL (ref 1.6–2.6)
OSMOLALITY UR: 242 MOSM/KG (ref 50–1200)
PHOSPHATE SERPL-MCNC: 1.7 MG/DL (ref 2.7–4.5)
PHOSPHATE SERPL-MCNC: 2.2 MG/DL (ref 2.7–4.5)
PHOSPHATE SERPL-MCNC: 2.2 MG/DL (ref 2.7–4.5)
POTASSIUM SERPL-SCNC: 4.1 MMOL/L (ref 3.5–5.1)
POTASSIUM SERPL-SCNC: 4.1 MMOL/L (ref 3.5–5.1)
PROT SERPL-MCNC: 4.8 G/DL (ref 6–8.4)
SODIUM SERPL-SCNC: 129 MMOL/L (ref 136–145)
SODIUM SERPL-SCNC: 130 MMOL/L (ref 136–145)
SODIUM UR-SCNC: 22 MMOL/L (ref 20–250)
URATE SERPL-MCNC: 12.3 MG/DL (ref 2.4–5.7)
URATE UR-MCNC: 22.9 MG/DL

## 2023-01-19 PROCEDURE — 87205 SMEAR GRAM STAIN: CPT | Performed by: INTERNAL MEDICINE

## 2023-01-19 PROCEDURE — 21400001 HC TELEMETRY ROOM

## 2023-01-19 PROCEDURE — 25000003 PHARM REV CODE 250: Mod: TB,JG | Performed by: INTERNAL MEDICINE

## 2023-01-19 PROCEDURE — 25000003 PHARM REV CODE 250: Performed by: INTERNAL MEDICINE

## 2023-01-19 PROCEDURE — 83735 ASSAY OF MAGNESIUM: CPT | Performed by: INTERNAL MEDICINE

## 2023-01-19 PROCEDURE — 25000003 PHARM REV CODE 250

## 2023-01-19 PROCEDURE — 83690 ASSAY OF LIPASE: CPT | Performed by: INTERNAL MEDICINE

## 2023-01-19 PROCEDURE — 83935 ASSAY OF URINE OSMOLALITY: CPT | Performed by: INTERNAL MEDICINE

## 2023-01-19 PROCEDURE — 27000221 HC OXYGEN, UP TO 24 HOURS

## 2023-01-19 PROCEDURE — 63600175 PHARM REV CODE 636 W HCPCS: Performed by: INTERNAL MEDICINE

## 2023-01-19 PROCEDURE — 97530 THERAPEUTIC ACTIVITIES: CPT

## 2023-01-19 PROCEDURE — 97162 PT EVAL MOD COMPLEX 30 MIN: CPT

## 2023-01-19 PROCEDURE — 82330 ASSAY OF CALCIUM: CPT | Performed by: INTERNAL MEDICINE

## 2023-01-19 PROCEDURE — 80053 COMPREHEN METABOLIC PANEL: CPT | Performed by: INTERNAL MEDICINE

## 2023-01-19 PROCEDURE — 63600175 PHARM REV CODE 636 W HCPCS

## 2023-01-19 PROCEDURE — 36415 COLL VENOUS BLD VENIPUNCTURE: CPT | Performed by: INTERNAL MEDICINE

## 2023-01-19 PROCEDURE — 82570 ASSAY OF URINE CREATININE: CPT | Performed by: INTERNAL MEDICINE

## 2023-01-19 PROCEDURE — 99233 PR SUBSEQUENT HOSPITAL CARE,LEVL III: ICD-10-PCS | Mod: ,,, | Performed by: INTERNAL MEDICINE

## 2023-01-19 PROCEDURE — 80069 RENAL FUNCTION PANEL: CPT | Performed by: INTERNAL MEDICINE

## 2023-01-19 PROCEDURE — 84550 ASSAY OF BLOOD/URIC ACID: CPT | Performed by: INTERNAL MEDICINE

## 2023-01-19 PROCEDURE — 25000242 PHARM REV CODE 250 ALT 637 W/ HCPCS

## 2023-01-19 PROCEDURE — 83735 ASSAY OF MAGNESIUM: CPT | Mod: 91 | Performed by: INTERNAL MEDICINE

## 2023-01-19 PROCEDURE — 99900035 HC TECH TIME PER 15 MIN (STAT)

## 2023-01-19 PROCEDURE — 51798 US URINE CAPACITY MEASURE: CPT

## 2023-01-19 PROCEDURE — 84560 ASSAY OF URINE/URIC ACID: CPT | Performed by: INTERNAL MEDICINE

## 2023-01-19 PROCEDURE — 84300 ASSAY OF URINE SODIUM: CPT | Performed by: INTERNAL MEDICINE

## 2023-01-19 PROCEDURE — 84100 ASSAY OF PHOSPHORUS: CPT | Performed by: INTERNAL MEDICINE

## 2023-01-19 PROCEDURE — 82550 ASSAY OF CK (CPK): CPT | Performed by: INTERNAL MEDICINE

## 2023-01-19 PROCEDURE — 94761 N-INVAS EAR/PLS OXIMETRY MLT: CPT

## 2023-01-19 PROCEDURE — 94640 AIRWAY INHALATION TREATMENT: CPT

## 2023-01-19 PROCEDURE — 82533 TOTAL CORTISOL: CPT | Performed by: INTERNAL MEDICINE

## 2023-01-19 PROCEDURE — 99233 SBSQ HOSP IP/OBS HIGH 50: CPT | Mod: ,,, | Performed by: INTERNAL MEDICINE

## 2023-01-19 RX ORDER — CALCIUM GLUCONATE 98 MG/ML
1 INJECTION, SOLUTION INTRAVENOUS ONCE
Status: DISCONTINUED | OUTPATIENT
Start: 2023-01-19 | End: 2023-01-19

## 2023-01-19 RX ORDER — LANOLIN ALCOHOL/MO/W.PET/CERES
400 CREAM (GRAM) TOPICAL ONCE
Status: COMPLETED | OUTPATIENT
Start: 2023-01-19 | End: 2023-01-19

## 2023-01-19 RX ORDER — CALCIUM GLUCONATE 20 MG/ML
1 INJECTION, SOLUTION INTRAVENOUS ONCE
Status: COMPLETED | OUTPATIENT
Start: 2023-01-19 | End: 2023-01-19

## 2023-01-19 RX ADMIN — GABAPENTIN 300 MG: 300 CAPSULE ORAL at 08:01

## 2023-01-19 RX ADMIN — AZELASTINE HYDROCHLORIDE 137 MCG: 137 SPRAY, METERED NASAL at 09:01

## 2023-01-19 RX ADMIN — IPRATROPIUM BROMIDE AND ALBUTEROL SULFATE 3 ML: 2.5; .5 SOLUTION RESPIRATORY (INHALATION) at 03:01

## 2023-01-19 RX ADMIN — GABAPENTIN 300 MG: 300 CAPSULE ORAL at 09:01

## 2023-01-19 RX ADMIN — AZELASTINE HYDROCHLORIDE 137 MCG: 137 SPRAY, METERED NASAL at 08:01

## 2023-01-19 RX ADMIN — Medication 1000 UNITS: at 08:01

## 2023-01-19 RX ADMIN — METOPROLOL SUCCINATE 50 MG: 50 TABLET, EXTENDED RELEASE ORAL at 08:01

## 2023-01-19 RX ADMIN — PANTOPRAZOLE SODIUM 40 MG: 40 TABLET, DELAYED RELEASE ORAL at 08:01

## 2023-01-19 RX ADMIN — SODIUM BICARBONATE 650 MG TABLET 650 MG: at 08:01

## 2023-01-19 RX ADMIN — IPRATROPIUM BROMIDE AND ALBUTEROL SULFATE 3 ML: 2.5; .5 SOLUTION RESPIRATORY (INHALATION) at 08:01

## 2023-01-19 RX ADMIN — SODIUM PHOSPHATE, MONOBASIC, MONOHYDRATE AND SODIUM PHOSPHATE, DIBASIC, ANHYDROUS 30 MMOL: 142; 276 INJECTION, SOLUTION INTRAVENOUS at 02:01

## 2023-01-19 RX ADMIN — GABAPENTIN 300 MG: 300 CAPSULE ORAL at 03:01

## 2023-01-19 RX ADMIN — DULOXETINE 60 MG: 30 CAPSULE, DELAYED RELEASE ORAL at 08:01

## 2023-01-19 RX ADMIN — POTASSIUM CHLORIDE 20 MEQ: 1500 TABLET, EXTENDED RELEASE ORAL at 09:01

## 2023-01-19 RX ADMIN — CALCIUM GLUCONATE 1 G: 20 INJECTION, SOLUTION INTRAVENOUS at 07:01

## 2023-01-19 RX ADMIN — SODIUM BICARBONATE 650 MG TABLET 650 MG: at 09:01

## 2023-01-19 RX ADMIN — ENOXAPARIN SODIUM 40 MG: 40 INJECTION SUBCUTANEOUS at 05:01

## 2023-01-19 RX ADMIN — Medication 400 MG: at 07:01

## 2023-01-19 RX ADMIN — HYDROCODONE BITARTRATE AND ACETAMINOPHEN 1 TABLET: 10; 325 TABLET ORAL at 08:01

## 2023-01-19 RX ADMIN — IPRATROPIUM BROMIDE AND ALBUTEROL SULFATE 3 ML: 2.5; .5 SOLUTION RESPIRATORY (INHALATION) at 11:01

## 2023-01-19 RX ADMIN — POTASSIUM CHLORIDE 20 MEQ: 1500 TABLET, EXTENDED RELEASE ORAL at 03:01

## 2023-01-19 RX ADMIN — Medication 250 MG: at 08:01

## 2023-01-19 RX ADMIN — COLLAGENASE SANTYL: 250 OINTMENT TOPICAL at 08:01

## 2023-01-19 RX ADMIN — ATORVASTATIN CALCIUM 40 MG: 20 TABLET, FILM COATED ORAL at 08:01

## 2023-01-19 RX ADMIN — CEFTRIAXONE 1 G: 1 INJECTION, SOLUTION INTRAVENOUS at 05:01

## 2023-01-19 RX ADMIN — POTASSIUM CHLORIDE 20 MEQ: 1500 TABLET, EXTENDED RELEASE ORAL at 08:01

## 2023-01-19 RX ADMIN — CALCIUM GLUCONATE 1 G: 20 INJECTION, SOLUTION INTRAVENOUS at 12:01

## 2023-01-19 RX ADMIN — DIBASIC SODIUM PHOSPHATE, MONOBASIC POTASSIUM PHOSPHATE AND MONOBASIC SODIUM PHOSPHATE 1 TABLET: 852; 155; 130 TABLET ORAL at 07:01

## 2023-01-19 RX ADMIN — TRAZODONE HYDROCHLORIDE 100 MG: 100 TABLET ORAL at 09:01

## 2023-01-19 NOTE — PROGRESS NOTES
IP Liaison - Initial Visit Note    Patient: Nalini Varma  MRN:  [id number]  Date of Service:  1/19/2023  Completed by:  DENILSON Reyes    Reason for Visit   Patient presents with    IP Liaison Initial Visit       RSW met with patient at bedside in order to complete SDOH questionnaire and liaison assessment.  Pt has identified no barriers to care.    The following were addressed during this visit:  - Review SDOH Questions   - Complete patient assessment   - Review and discuss program options related to housing needs   - Review and discuss options for social groups or events   - Complete initial visit with patient        Patient Summary     IP Liaison Patient Assessment    General  Level of Caregiver support: Member independent and does not need caregiver assistance  Have you had to make a decision between paying for any of the following in the last 2 months?: None  Transportation means: Family  Employment status: Retired and not working  Current symptoms: Sleep disturbances  Assessments  Was the PHQ Depression Screening completed this visit?: Yes  Was the MARY-7 Screening completed this visit?: No       DENILSON Reyes

## 2023-01-19 NOTE — ASSESSMENT & PLAN NOTE
Hypotensive from GI losses and sepsis secondary to UTI   -Continue with Metoprolol XL 50mg  -Continue to old home dose losartan 100mg PO daily

## 2023-01-19 NOTE — PLAN OF CARE
Problem: Physical Therapy  Goal: Physical Therapy Goal  Outcome: Met     Patient evaluated and no acute care PT goals identified. Patient is WC user at baseline and transfers via stand pivot. Demo's mod(I) transfers, some difficulty with bed mobility but able to complete without physical assistance.     During this hospitalization, patient does not require further acute PT services.  Please re-consult if situation changes.  Recommendation for d/c to home with HH PT to maximize functional status - would benefit from HB but requires electric HB with half railings.

## 2023-01-19 NOTE — PLAN OF CARE
POC reviewed w/ pt. AAOx4. Cardiac monitor maintained. VSS on 3L NC. No complaints of pain. Turn Q2/frequent weight shift assistance provided. Instructed to call for help ambulating. No injuries, falls, or trauma occurred during shift. Purposeful rounding completed. Bed alarm set, bed low and locked, side rails up x3, call light within reach.

## 2023-01-19 NOTE — SUBJECTIVE & OBJECTIVE
Interval History: Patient is awake and alert this morning.  She is overall feeling slightly better.  Had a very small soft BM yesterday morning and none since.       Review of Systems   Constitutional:  Positive for appetite change (improved) and fatigue (improved). Negative for chills and fever.   HENT:  Negative for congestion, ear pain and sore throat.    Eyes:  Negative for pain and redness.   Respiratory:  Positive for shortness of breath (stable on 3L O2NC at home). Negative for cough.    Cardiovascular:  Negative for chest pain and leg swelling.   Gastrointestinal:  Positive for diarrhea (resolved). Negative for nausea and vomiting.   Genitourinary:  Negative for dysuria and flank pain.   Musculoskeletal:  Positive for myalgias (improved). Negative for neck pain.   Skin:  Negative for rash and wound.   Neurological:  Negative for weakness, numbness and headaches.   Psychiatric/Behavioral:  Negative for agitation, behavioral problems and confusion. The patient is not nervous/anxious.    Objective:     Vital Signs (Most Recent):  Temp: 98.1 °F (36.7 °C) (01/19/23 0748)  Pulse: 79 (01/19/23 0838)  Resp: 14 (01/19/23 0838)  BP: 125/64 (01/19/23 0748)  SpO2: 98 % (01/19/23 0838)   Vital Signs (24h Range):  Temp:  [97.7 °F (36.5 °C)-98.5 °F (36.9 °C)] 98.1 °F (36.7 °C)  Pulse:  [73-91] 79  Resp:  [14-22] 14  SpO2:  [92 %-100 %] 98 %  BP: ()/(50-69) 125/64     Weight: 96.2 kg (212 lb)  Body mass index is 34.22 kg/m².    Intake/Output Summary (Last 24 hours) at 1/19/2023 1004  Last data filed at 1/19/2023 0734  Gross per 24 hour   Intake 1495.37 ml   Output 500 ml   Net 995.37 ml        Physical Exam  Vitals and nursing note reviewed.   Constitutional:       General: She is not in acute distress.     Appearance: She is obese. She is ill-appearing (chronically). She is not toxic-appearing.   HENT:      Head: Normocephalic and atraumatic.      Right Ear: External ear normal.      Left Ear: External ear normal.       Nose: Nose normal. No congestion or rhinorrhea.      Mouth/Throat:      Mouth: Mucous membranes are moist.      Pharynx: Oropharynx is clear.   Eyes:      General: No scleral icterus.        Right eye: No discharge.         Left eye: No discharge.      Pupils: Pupils are equal, round, and reactive to light.   Cardiovascular:      Rate and Rhythm: Normal rate and regular rhythm.      Pulses: Normal pulses.      Heart sounds: Normal heart sounds.   Pulmonary:      Effort: Pulmonary effort is normal.      Breath sounds: Normal breath sounds. Wheezes: expiratory wheezes - improved.      Comments: Stable on home 3L O2 NC  Abdominal:      General: Bowel sounds are normal. There is no distension.      Tenderness: There is no abdominal tenderness.   Musculoskeletal:      Cervical back: Normal range of motion.      Right lower leg: Edema (trace edema) present.      Left lower leg: Edema (trace edema) present.   Skin:     General: Skin is warm and dry.   Neurological:      General: No focal deficit present.      Mental Status: She is alert and oriented to person, place, and time. Mental status is at baseline.   Psychiatric:         Mood and Affect: Mood normal.         Behavior: Behavior normal.       Significant Labs: All pertinent labs within the past 24 hours have been reviewed.    Significant Imaging: I have reviewed all pertinent imaging results/findings within the past 24 hours.

## 2023-01-19 NOTE — ASSESSMENT & PLAN NOTE
Patient mostly confined to Wheelchair after previously using Rolator at home.  Has chronic pain from OA of Knees, chronic LBP and Left Shoulder pain.  Deconditioned after her diarrheal illness as above.  She lives alone.  Will to work with PT.  -Consult PT.

## 2023-01-19 NOTE — ASSESSMENT & PLAN NOTE
Elevated Creatinine of 1.4 on admission with a baseline of 1.0.  Likely due to hypovolemia from GI losses.  Appreciate Nephrology recommendations.  S/p IV fluids with improvement of creatinine back to 1.0.  -Continue IV fluids for now  - Avoid nephrotoxins, renally dose medication

## 2023-01-19 NOTE — PROGRESS NOTES
"Progress Note  Nephrology    Consult Requested By: Paras Antonio MD  Reason for Consult: multiple electrolyte abnormalities  SUBJECTIVE:     History of Present Illness:  Patient is a 72 y.o. female presents with 7-10 days of worsening diarrhea. Pt usually has loose stools and takes imodium which usually controls symptoms. About 10 days ago this stopped working and her stools became liquid and large volume despite imodium use. She also had not been able to tolerate any diet due to nausea and vomiting for the same duration. She tried drinking Gatorade , but " this made me more nauseous." She then drank some soda with electrolytes, but no much of this. I am seeing her in the ED and she tells me that  she she has a hx of SIADH. She also has a history of chronic hypocalcemia and hypophosphatemia related to Prolia injections that she takes for osteoporosis, and she appears to still be on this medication. She has already received 1.5L NS and calcium replacement with calcium gluconate  and magnesium replacement with MagSO4 in ED. She was also started on empiric Rocephin in D5 carrier solution 50mL. In additional to the above GI issues she does also endorse some increase MEDELLIN/ SOB, over last week. She denies cough, fever chills, CP , Palps. She has had to increase her home O2 from 3L to 4L to help with this issue. CXR today shows small pleural effusions.    She is also on chemotherapy for her lung cancer with last treatment 1/6/2023 Taxol/ Carboplatin    Currently her nausea is improved and she is attempting to eat lunch.    Interval history: Feeling better overall.    Past Medical History:   Diagnosis Date    Anxiety     Cervical spinal stenosis     Choledocholithiasis     Chronic obstructive pulmonary disease 03/16/2016    Degenerative disc disease of cervical spine     Diverticulosis 04/24/2018    History of alcohol abuse 03/02/2021    History of gastric ulcer     History of L3 compression fracture 12/19/2018    " History of lung cancer     s/p completion of XRT in 10/2020    Hyperlipidemia     Iron deficiency anemia     Osteoarthritis     Stage III CKD 2017     Past Surgical History:   Procedure Laterality Date    BREAST CYST EXCISION Right     1967    CATARACT EXTRACTION      COLONOSCOPY  2015    COLONOSCOPY N/A 6/8/2022    Procedure: COLONOSCOPY;  Surgeon: Helio Cheema MD;  Location: [facility] ENDO (2ND FLR);  Service: Endoscopy;  Laterality: N/A;  5/25-Pt requesting Dr. Cheema-approval given per Dr. Cheema-ml  Fully vaccinated, prep instr portal -ml    CORONARY ANGIOGRAPHY N/A 7/20/2018    Procedure: ANGIOGRAM, CORONARY ARTERY;  Surgeon: Willard Roberts MD;  Location: [facility] CATH LAB;  Service: Cardiovascular;  Laterality: N/A;    ENDOSCOPIC ULTRASOUND OF UPPER GASTROINTESTINAL TRACT N/A 3/24/2021    Procedure: ULTRASOUND, UPPER GI TRACT, ENDOSCOPIC;  Surgeon: Helio Cheema MD;  Location: [facility] (2ND FLR);  Service: Endoscopy;  Laterality: N/A;    ENDOSCOPIC ULTRASOUND OF UPPER GASTROINTESTINAL TRACT N/A 4/25/2022    Procedure: ULTRASOUND, UPPER GI TRACT, ENDOSCOPIC;  Surgeon: Helio Cheema MD;  Location: [facility] (2ND FLR);  Service: Endoscopy;  Laterality: N/A;  cardiac risk assessment 1 per Dr. Ortez. see t/e 3/17-SC  3/25:new instructions via portal. home with medical transport?-SC    ERCP N/A 3/24/2021    Procedure: ERCP (ENDOSCOPIC RETROGRADE CHOLANGIOPANCREATOGRAPHY);  Surgeon: Helio Cheema MD;  Location: [facility] ENDO (2ND FLR);  Service: Endoscopy;  Laterality: N/A;    ERCP N/A 4/30/2021    Procedure: ERCP (ENDOSCOPIC RETROGRADE CHOLANGIOPANCREATOGRAPHY);  Surgeon: Boo Gray MD;  Location: [facility] ENDO (2ND FLR);  Service: Endoscopy;  Laterality: N/A;    ERCP N/A 7/1/2021    Procedure: ERCP (ENDOSCOPIC RETROGRADE CHOLANGIOPANCREATOGRAPHY);  Surgeon: Helio Cheema MD;  Location: [facility] ENDO (2ND FLR);  Service: Endoscopy;  Laterality: N/A;  Ok for Taxi. Dr Cheema  rapid covid 1130am- tb  inst email    ESOPHAGOGASTRODUODENOSCOPY  2015    ESOPHAGOGASTRODUODENOSCOPY N/A 3/4/2021    Procedure: EGD (ESOPHAGOGASTRODUODENOSCOPY);  Surgeon: Yenifer Ramirez MD;  Location: [facility];  Service: Endoscopy;  Laterality: N/A;    ESOPHAGOGASTRODUODENOSCOPY N/A 3/24/2021    Procedure: EGD (ESOPHAGOGASTRODUODENOSCOPY);  Surgeon: Helio Cheema MD;  [facility]  Service: Endoscopy;  Laterality: N/A;    EYE SURGERY  2016    Cataracts    FRACTURE SURGERY  2014    JOINT REPLACEMENT  2007    ORIF FEMUR FRACTURE Left     TOTAL KNEE ARTHROPLASTY Left 2007    TUBAL LIGATION       Family History   Problem Relation Age of Onset    Hypertension Mother     Alzheimer's disease Mother     Dementia Mother     Depression Mother         Alzheimers    Myasthenia gravis Father     Arthritis Father         Myasthenia Gravis    Rectal cancer Father     Hypertension Brother     Arthritis Brother         Colon cancer, obese, high blood pressure    Colon cancer Brother     No Known Problems Son     Cancer Sister         Brain cancer    Miscarriages / Stillbirths Sister     Melanoma Neg Hx     Breast cancer Neg Hx      Social History     Tobacco Use    Smoking status: Some Days     Packs/day: 1.00     Years: 53.00     Pack years: 53.00     Types: Cigarettes     Start date: 4/30/1967    Smokeless tobacco: Never    Tobacco comments:     I had quit for about 6 months this past year. Then massive stress and started back up sometimes   Substance Use Topics    Alcohol use: Yes     Alcohol/week: 7.0 standard drinks     Types: 7 Drinks containing 0.5 oz of alcohol per week     Comment: at least 1 cocktail a day    Drug use: No       Medications Prior to Admission   Medication Sig Dispense Refill Last Dose    albuterol (VENTOLIN HFA) 90 mcg/actuation inhaler inhale 1-2 puffs as needed every 6 hours for wheezing and shortness of breath 25.5 g 11 1/17/2023    ALPRAZolam (XANAX) 0.25 MG tablet Take 1 tablet (0.25 mg total) by  mouth daily as needed for Anxiety. 30 tablet 3 1/18/2023    ascorbic acid, vitamin C, (VITAMIN C) 250 MG tablet Take 250 mg by mouth once daily.   Past Week    atorvastatin (LIPITOR) 40 MG tablet TAKE 1 TABLET BY MOUTH EVERY DAILY 90 tablet 3 1/17/2023    azelastine (ASTELIN) 137 mcg (0.1 %) nasal spray Instill 1 spray (137 mcg total) by Nasal route 2 (two) times daily. 30 mL 3 1/18/2023    b complex vitamins capsule Take 1 capsule by mouth once daily.   Past Week    biotin 10 mg Tab Take 10 mg by mouth once daily.   Past Week    DULoxetine (CYMBALTA) 60 MG capsule Take 1 capsule (60 mg total) by mouth once daily. 90 capsule 2 1/17/2023    fluticasone (FLONASE) 50 mcg/actuation nasal spray 1 spray by Each Nare route 2 (two) times daily as needed for Rhinitis.   Past Week    fluticasone propion-salmeterol 115-21 mcg/dose (ADVAIR HFA) 115-21 mcg/actuation HFAA inhaler Inhale 2 puffs into the lungs every 12 (twelve) hours. 36 g 3 1/17/2023    gabapentin (NEURONTIN) 300 MG capsule Take 1 capsule (300 mg total) by mouth 3 (three) times daily. 90 capsule 11 1/17/2023    guaiFENesin (MUCINEX) 600 mg 12 hr tablet Take 1,200 mg by mouth 2 (two) times daily as needed for Congestion.   1/18/2023    HYDROcodone-acetaminophen (NORCO)  mg per tablet Take 1 tablet by mouth every 12 (twelve) hours as needed for Pain. 60 tablet 0 1/18/2023    losartan (COZAAR) 100 MG tablet Take 1 tablet (100 mg total) by mouth once daily. 90 tablet 3 1/17/2023    lutein 40 mg Cap Take by mouth.   Past Week    magnesium oxide (MAG-OX) 400 mg (241.3 mg magnesium) tablet Take 1 tablet by mouth every 12 (twelve) hours. 30 tablet 0 Past Week    metoprolol succinate (TOPROL-XL) 25 MG 24 hr tablet Take 2 tablets (50 mg total) by mouth once daily. 60 tablet 11 1/17/2023    multivit-min/iron/folic/lutein (CENTRUM SILVER WOMEN ORAL) Take 1 tablet by mouth once daily.   Past Week    pantoprazole (PROTONIX) 40 MG tablet Take 1 tablet (40 mg total) by  mouth once daily. 90 tablet 3 1/17/2023    torsemide (DEMADEX) 20 MG Tab Take 1 tablet (20 mg total) by mouth once daily. 90 tablet 3 1/17/2023    traZODone (DESYREL) 100 MG tablet Take 1 tablet (100 mg total) by mouth every evening. 30 tablet 2 1/17/2023    umeclidinium (INCRUSE ELLIPTA) 62.5 mcg/actuation inhalation capsule Inhale 1 puff into the lungs once daily. Controller 90 each 3 1/17/2023    vitamin D (VITAMIN D3) 1000 units Tab Take 1 tablet (1,000 Units total) by mouth once daily. 30 tablet 2 Past Week    ZINC ORAL Take by mouth.   Past Week    calcium carbonate (OS-SANDRINE) 500 mg calcium (1,250 mg) tablet Take 2 tablets (1,000 mg total) by mouth 2 (two) times daily. 30 tablet 0     cyanocobalamin, vitamin B-12, 1,000 mcg TbSR Take 1,000 mcg by mouth once daily.       denosumab (PROLIA) 60 mg/mL Syrg Inject 1 mL (60 mg total) into the skin every 6 (six) months. 2 mL 0 More than a month    ondansetron (ZOFRAN-ODT) 8 MG TbDL dissolve 1 tablet (8 mg total) by mouth every 8 (eight) hours as needed (nausea). 60 tablet 2     promethazine (PHENERGAN) 25 MG tablet Take 1 tablet (25 mg total) by mouth every 6 (six) hours as needed for Nausea. 60 tablet 3          Review of patient's allergies indicates:   Allergen Reactions    Wellbutrin [bupropion hcl] Other (See Comments)     Hyponatremia and hypomagnesia     Zoloft [sertraline] Other (See Comments)     Hyponatremia and hypomagnesia     Bananas [banana]      Emesis, and stomach cramps    Patient states she is not allergic to Banana        Review of Systems:  Constitutional: No fever or chills, no weight changes.  Eyes: No visual changes or photophobia  HEENT: No nasal congestion or sore throat  Respiratory: No cough or shorness of breath  Cardiovascular: No chest pain or palpitations  Gastrointestinal: Good appetite, no nausea or vomiting, no change in bowel habits  Genitourinary: No hematuria or dysuria  Skin: No rash or pruritis  Hematologic/lymphatic: No easy  bruising, bleeding or lymphadenopathy  Musculoskeletal: No arthralgias or myalgias  Neurological: No seizures or tremors  Endocrine: No heat/cold intolerance.  No polyuria/polydipsia.  Psychiatric:  No depression or anxiety.     OBJECTIVE:     Vital Signs (Most Recent)  Temp: 98.1 °F (36.7 °C) (01/19/23 0748)  Pulse: 79 (01/19/23 0838)  Resp: 14 (01/19/23 0838)  BP: 125/64 (01/19/23 0748)  SpO2: 98 % (01/19/23 0838)    Vital Signs Range (Last 24H):  Temp:  [97.7 °F (36.5 °C)-98.5 °F (36.9 °C)]   Pulse:  [73-91]   Resp:  [14-22]   BP: ()/(50-69)   SpO2:  [92 %-100 %]     Physical Exam:    General appearance: Well developed, well nourished  HEENT: NC in place  Lungs: Clear to auscultation bilaterally and normal respiratory effort  Heart: Regular rate and rhythm, S1, S2 normal, no murmur, click, rub or gallop  Abdomen: Soft, non-tender non-distended; bowel sounds normal  Extremities: No cyanosis or clubbing. Trace bilateral LE edema  Pulses: 2+ and symmetric  Psychiatric:  Alert and oriented times 3.  Affect appropriate.     Diagnostic Results:  Labs: Reviewed  X-Ray: Reviewed    ASSESSMENT/PLAN:   Acute kidney injury/ acidosis  -Baseline cr 0.9 -1.1  -admit Cr 1.4> 1.5 this am  -suspect due to volume status, UTI and profuse diarrhea.  -Urine culture sent and on rocephin  -Given IVFs with NS  -recheck chem, urine lytes, Eosin, uric acid and cpk.  -Repeat CT abd/ pelvis w/o to R/O obstruction or intrinsic structural abnormality given that abnormalities on last CT.  -Acidosis now non-AG and likely due to diarrhea  -Will need to take caution in correcting acidosis due to Na.  -1/19: agree with continuing IVF's for now. Cr improving.    Acute on Chronic Hyponatremia   -Underlying SIADH by history with chronic Na level in low 130s  -On Torsemide at home  -She appears to be clinically volume expanded although this could be due to her poor nutriotnal state and underlying cancer.  -BNP is not markedly elevated but can be  inaccurate in obesity.  -Given 1.5L NS in ED  -Check urine lytes, urine Osm, (although these may not be accurate since she has already received IVFs).  -In addition her chemo can cause hyponatremia as well as her volume status.  -so far Na improving to her baseline    Hypokalemia  -Repleted by primary    Acute on chronic Hypocalcemia/ Hypophosphatemia  -Given calcium glucanate  2g in ED  -Is on Prolia but will see when her last dose was.  -Also can be a side effect of her chemo and diarrhea  -replace prn and continue oral Calcium and Vit D as well.    Hypomagnesemia  -Given Magnesium SO4 4 g  -Seem to require multiple chronic infusion replacements.  -Also can be a side effect of her chemo and diarrhea  -On PPI as well.  -replace prn    Malnutrition  -Alb 1.8-2.1    Hyperuricema  -high uric acid could be from volume status, diuretics, tumor burden.  -repeat after volume.  -May need Allopurinol.    Bilateral Pleural Effusions/COPD/Right lung adenocarcinoma with recurrence   -per Pulmonary with bedside US no effusion on R, very small effusion on L.  -on home O2 2LNC titrate to O2 sat 88-02% on triple therapy inh  -on chemo with Taxol/ Carboplatin last infusion 1/6/23.  -Considering stopping.    Thanks for consultation, will follow along with you.   High complexity MDM because: JONATHAN acidosis, SIADH, multiple electrolyte abnormalities, Lung cancer diarrhea, N/V  -Chronic illness with SEVERE exacerbation, progression, or side effects of treatment.  -Acute or chronic illness or injury that poses a threat to life or bodily function  -Need for emergent dialysis  -Drug therapy requiring intensive monitoring for toxicity  -Chronic illness with MILD exacerbation, progression, or side effects of treatment  -2 or more stable chronic illnesss  -Acute illness with systemic symptoms  -Prescription drug management  -Discussion with family

## 2023-01-19 NOTE — PHYSICIAN QUERY
PT Name: Nalini Varma  MR #: [id number]     DOCUMENTATION CLARIFICATION      CDS/: NAPOLEON Victoria, RN, CCDS              Contact information: [email or web address]    This form is a permanent document in the medical record.     Query Date: January 19, 2023    Dear Provider,  By submitting this query, we are merely seeking further clarification of documentation.  Please utilize your independent clinical judgment when addressing the question(s) below.     The Medical Record contains the following:    Supporting Clinical Findings Location in Medical Record   Septic shock  - Shock presumed septic in setting of ?intraabdominal fluid collection / loose stool / hypotension not responsive to fluids alone    afebrile without leukocytosis or shift, though chemotherapy may blunt immune response and patient does not have significant tenderness or peritonitis    Abdominal examination has improved significantly.  Patient is tolerating a diet.  Clinical course not consistent with perforated diverticulitis   H & P: Dr. Austin 12/23        Gen Surg: Dr. Dillon 12/23      Surg: Dr. Dillon 12/24   Sepsis    - pt w/ diarrheal illness  initial LA 3.9, now down to 1    Continue piperacillin-tazobactam 4.5g IV q8hr for potential intra-abdominal source. Surgery consulted in ED; suspect this represents a large diverticula rather than abscess    Admitted with shock suspected 2/2 sepsis. Evaluated for C difficile but negative; treated with empiric piperacillin-tazobactam    CXR showed L-sided pleural effusion  CT Abd/Pelvis showed air/fluid collection in pelvis unclear if representing perisigmoid abscess vs large diverticulum.     She received IV hydration with 600mL NS in total without significant improvement in pressures. She was started on norepinephrine gtt and received piperacillin-tazobactam   Pulm: Dr. Ace 12/24         PN: Dr. Austin 12/26        DCS: Dr. Austin 12/27   T: 98.1, 98.1, 98.2,   HR: 80, 77, 77,   RR: 20, 23,  34   flowsheet   WBC: 8.54, 5.41     Lactic Acid: 3.8, 1.9  Procal: 0.46    Lab: 12/23, 12/24     Lab: 12/23, 12/23  Lab: 12/23     Please clarify if the ___sepsis________________________ diagnosis has been:    [  ] Ruled In   [ X ] Ruled In, Now Resolved   [  ] Ruled Out   [  ] Still to be ruled out at the time of discharge   [  ] Other/Clarification of findings (please specify): _______________    [   ] Clinically undetermined     Please document in your progress notes daily for the duration of treatment, until resolved, and include in your discharge summary.    Form No. [id number]

## 2023-01-19 NOTE — PLAN OF CARE
Pt remained free of falls and injuries throughout shift. AAOx4. Pt calm and cooperative. Purposeful hourly rounding performed. Pt swallows meds whole. Pt received IV Calcium gluconate and IV Sodium phosphate infusing at this time. Pt received IV Rocephin as ordered. Patient chairfast, weight shift assist provided q2h. Pure wick in place to suction, UA specimen collected, stool specimen yet to be collected, no BM this shift. VSS on 3L O2 NC. Pt resting comfortably in bed, denies needs at this time, NADN. Bed low and locked, bed alarm on, call light in reach. Side rails up x3. Will continue to monitor.

## 2023-01-19 NOTE — PLAN OF CARE
Recommendations  Encourage good PO intake at meals  Monitor strict I&Os/weights  Monitor nutrition related labs  - replace electrolytes as needed    Goals:   Patient to meet >/=75% EEN/EPN by RD follow up  Monitored labs to trend toward target ranges    Nutrition Goal Status: new  Communication of RD Recs:  (POC)

## 2023-01-19 NOTE — PT/OT/SLP EVAL
Physical Therapy Evaluation and Discharge Note    Patient Name:  Nalini Varma   MRN:  [id number]    Recommendations:     Discharge Recommendations: home health PT  Discharge Equipment Recommendations:  (would benefit from electric hospital bed with half railing)   Barriers to discharge: None    Assessment:     Nalini Varma is a 72 y.o. female admitted with a medical diagnosis of Hypovolemia dehydration. She presents with the following impairments/functional limitations: weakness, impaired functional mobility, gait instability, impaired balance, decreased lower extremity function, decreased ROM, impaired joint extensibility.    Patient evaluated and no acute care PT goals identified. Patient is WC user at baseline and transfers via stand pivot. Demo's mod(I) transfers, some difficulty with bed mobility but able to complete without physical assistance. Addressed decreased endurance with longer sitting in WC and long sitting in bed.       During this hospitalization, patient does not require further acute PT services.  Please re-consult if situation changes.  Recommendation for d/c to home with HH PT to maximize functional status - would benefit from HB but requires electric HB with half railings.    Recent Surgery: * No surgery found *      Plan:     During this hospitalization, patient does not require further acute PT services.  Please re-consult if situation changes.      Subjective     Chief Complaint: None verbalized, denies pain but reports difficult to scoot  Patient/Family Comments/goals: Goal to return home, misses her own cooking; Patient agreeable to evaluation.  Pain/Comfort:  Pain Rating 1: 0/10  Pain Rating Post-Intervention 1: 0/10    Patients cultural, spiritual, Protestant conflicts given the current situation: no    Living Environment:  Pt lives alone in an elevator accessible apartment  Upon discharge, patient will have assistance from her friend PRN.  Prior level of function:  Ambulation:  Mod(I) with transfers to , propels WC (I)  ADL's: Mod(I) from WC  IADLs: Mod(I) from WC  Falls: Denies  Equipment used at home: wheelchair, oxygen.      Objective:     Communicated with ANDERSON Mckee prior to session.  Patient found HOB elevated with peripheral IV, PureWick, bed alarm upon PT entry to room.    General Precautions: Standard, fall    Orthopedic Precautions:N/A   Braces: N/A  Respiratory Status: Nasal cannula, flow 3 L/min    Patient donned non slip socks and gait belt for OOB mobility.    Exams:  Cognition:   Patient is oriented x4.  Pt follows approximately 100% of one and two step commands.    Mood: Pleasant and cooperative, talkative.   Safety Awareness: Good  Musculoskeletal:  BMI: 34.22  Posture:  Forward head, rounded shoulders  LE ROM/Strength:   R ROM: WFL  L ROM: WFL  R Strength: WFL  L Strength: WFL  Neuromuscular:  Coordination/Tone/Reflexes: No impairments identified with functional mobility. No formal testing performed.   Balance:   Static sitting: Good  Static standing: Fair  Dynamic standing: Poor  Visual-vestibular: No impairments identified with functional mobility. No formal testing performed.  Integument: Visible skin intact  Cardiopulmonary:  Edema: None noted    Functional Mobility:  Bed Mobility:     Bridging: moderate assistance  Supine to Sit: modified independence and use of HB features  Sit to Supine: modified independence and use of HB features  Transfers:     Sit to Stand:  modified independence with grab bars  Bed to Chair: modified independence with  no AD  using  Stand Pivot  Bed<>chair  Balance: Static standing x30 sec with BUE support on armrests and SBA - performs stretch to back and hips before transferring    AM-PAC 6 CLICK MOBILITY  Total Score:18       Treatment and Education:  TA:  Facilitated longer sitting upright unsupported in long sitting and supported in WC for increased flexibility and increased endurance  Cueing for bed mobility (scooting/bridging) -  increased difficulty d/t fatigue and soft bed surface  PT educated patient re:   PT plan of care/role of PT  Safety with OOB mobility  Use of WC  Discharge disposition    Pt verbalized understanding       AM-PAC 6 CLICK MOBILITY  Total Score:18     Patient left HOB elevated with all lines intact, call button in reach, bed alarm on, RN phillip notified, and white baord udpated .    GOALS:   Multidisciplinary Problems       Physical Therapy Goals       Not on file              Multidisciplinary Problems (Resolved)          Problem: Physical Therapy    Goal Priority Disciplines Outcome Goal Variances Interventions   Physical Therapy Goal   (Resolved)     PT, PT/OT Met                         History:     Past Medical History:   Diagnosis Date    Anxiety     Cervical spinal stenosis     Choledocholithiasis     Chronic obstructive pulmonary disease 03/16/2016    Degenerative disc disease of cervical spine     Diverticulosis 04/24/2018    History of alcohol abuse 03/02/2021    History of gastric ulcer     History of L3 compression fracture 12/19/2018    History of lung cancer     s/p completion of XRT in 10/2020    Hyperlipidemia     Iron deficiency anemia     Osteoarthritis     Stage III CKD 2017       Past Surgical History:   Procedure Laterality Date    BREAST CYST EXCISION Right     1967    CATARACT EXTRACTION      COLONOSCOPY  2015    COLONOSCOPY N/A 6/8/2022    Procedure: COLONOSCOPY;  Surgeon: Helio Cheema MD;  Location: [address]);  Service: Endoscopy;  Laterality: N/A;  5/25-Pt requesting Dr. Cheema-approval given per Dr. Cheema-ml  Fully vaccinated, prep instr portal -ml    CORONARY ANGIOGRAPHY N/A 7/20/2018    Procedure: ANGIOGRAM, CORONARY ARTERY;  Surgeon: Willard Roberts MD;  Location: [facility] CATH LAB;  Service: Cardiovascular;  Laterality: N/A;    ENDOSCOPIC ULTRASOUND OF UPPER GASTROINTESTINAL TRACT N/A 3/24/2021    Procedure: ULTRASOUND, UPPER GI TRACT, ENDOSCOPIC;  Surgeon: Helio GERMAIN  MD Deni;  [place] [facility]  Service: Endoscopy;  Laterality: N/A;    ENDOSCOPIC ULTRASOUND OF UPPER GASTROINTESTINAL TRACT N/A 4/25/2022    Procedure: ULTRASOUND, UPPER GI TRACT, ENDOSCOPIC;  Surgeon: Helio Cheema MD;  [facility] (2ND F[facility]);  Service: Endoscopy;  Laterality: N/A;  cardiac risk assessment 1 per Dr. Ortez. see t/e 3/17-SC  3/25:new instructions via portal. home with medical transport?-SC    ERCP N/A 3/24/2021    Procedure: ERCP (ENDOSCOPIC RETROGRADE CHOLANGIOPANCREATOGRAPHY);  Surgeon: Helio Cheema MD;  [facility]2ND F[facility]);  Service: Endoscopy;  Laterality: N/A;    ERCP N/A 4/30/2021    Procedure: ERCP (ENDOSCOPIC RETROGRADE CHOLANGIOPANCREATOGRAPHY);  Surgeon: Boo Gray MD;  Location: [facility] (2ND F[facility]);  Service: Endoscopy;  Laterality: N/A;    ERCP N/A 7/1/2021    Procedure: ERCP (ENDOSCOPIC RETROGRADE CHOLANGIOPANCREATOGRAPHY);  Surgeon: Helio Cheema MD;  [facility] F[facility]);  Service: Endoscopy;  Laterality: N/A;  Ok for Taxi. Dr Cheema  rapid covid 1130am- tb inst email    ESOPHAGOGASTRODUODENOSCOPY  2015    ESOPHAGOGASTRODUODENOSCOPY N/A 3/4/2021    Procedure: EGD (ESOPHAGOGASTRODUODENOSCOPY);  Surgeon: Yenifer Ramirez MD;  Location: [facility];  Service: Endoscopy;  Laterality: N/A;    ESOPHAGOGASTRODUODENOSCOPY N/A 3/24/2021    Procedure: EGD (ESOPHAGOGASTRODUODENOSCOPY);  Surgeon: Helio Cheema MD;  [facility]);  Service: Endoscopy;  Laterality: N/A;    EYE SURGERY  2016    Cataracts    FRACTURE SURGERY  2014    JOINT REPLACEMENT  2007    ORIF FEMUR FRACTURE Left     TOTAL KNEE ARTHROPLASTY Left 2007    TUBAL LIGATION         Time Tracking:     PT Received On: 01/19/23  PT Start Time: 1601     PT Stop Time: 1639  PT Total Time (min): 38 min     Billable Minutes: Evaluation 20 and Therapeutic Activity 18      01/19/2023

## 2023-01-19 NOTE — ASSESSMENT & PLAN NOTE
Hx of anxiety, MDD.  Chronic. Controlled.  - Alprazolam 0.25mg PRN  -Continue home meds of Duloxetine 60mg daily, Trazodone 100mg qhs PRN

## 2023-01-19 NOTE — ASSESSMENT & PLAN NOTE
"Patient presented with increased SOB from her baseline.  She still smokes 1ppd and on home O2 at 3L NC.  Likely from COPD exacerbation. CXR on admission with findings suggest edema noting left pleural effusion.  Increased parenchymal attenuation projects over the right upper lung zone, developing consolidation is not excluded noting the appearance is similar to previous exams.  Lactic Acid elevated at 2.3 and normalized.  .  Patient seen by Pulmonary and "The patient's working assessments include diarrhea, COPD, small L sided pleural effusion by POCUS.  Do not suspect the chronic effusion is operant in her current admit and do not advise tap.  Continue home COPD regimen".  Lung exam with much improved wheezing.    Plan:  Continue with O2 at tolerated to keep SpO2 >92%   Continue with Duonebs Q4 wake  Encourage smoking cessation  Strict intake and output    "

## 2023-01-19 NOTE — CONSULTS
[facility] Med [facility]urg ([place])  Adult Nutrition  Progress Note    SUMMARY       Recommendations  Encourage good PO intake at meals  Monitor strict I&Os/weights  Monitor nutrition related labs  - replace electrolytes as needed    Goals:   Patient to meet >/=75% EEN/EPN by RD follow up  Monitored labs to trend toward target ranges    Nutrition Goal Status: new  Communication of RD Recs:  (POC)    Assessment and Plan  Nutrition Problem  Altered nutrition related lab values    Related to (etiology):   UTI, CKD     Signs and Symptoms (as evidenced by):   Hyponatremia, hypokalemia, hypomagnesemia, hypocalcemia, hypoalbuminemia    Interventions:  Collaboration with other providers    Nutrition Diagnosis Status:   New      Malnutrition Assessment  Subcutaneous Fat Loss (Final Summary): well nourished  Muscle Loss Evaluation (Final Summary): well nourished  Fluid Accumulation Evaluation: mild         Reason for Assessment  Reason For Assessment: consult (hypoalbuminemia; diarrhea 6 days; bg of CKD)  Diagnosis:  (Hypovolemia dehydration)  Relevant Medical History:   Patient Active Problem List   Diagnosis    Chronic obstructive pulmonary disease    Opioid dependence    Essential hypertension    Iron deficiency anemia    Tobacco dependence    Hyperlipidemia    Osteoporosis    Generalized anxiety disorder    Hypomagnesemia    Pulmonary nodule    Diverticulosis    Chronic combined systolic and diastolic CHF (congestive heart failure)    History of L3 compression fracture    Vitamin D deficiency    Vitamin B12 deficiency    Chronic pain syndrome    History of lung cancer    Chronic GI bleeding    History of alcohol abuse    Choledocholithiasis    Elevated LFTs    Urinary tract infection without hematuria    Urinary retention    Cervical spinal stenosis    Debility    Acute on chronic respiratory failure with hypoxia and hypercapnia    Hyponatremia    Hypocalcemia    Spinal stenosis    Multiple thyroid nodules    Severe obesity    CKD  pt states he is here  for staple removal from scalp placed on august 5th. "(chronic kidney disease) stage 3, GFR 30-59 ml/min    MDD (major depressive disorder), recurrent severe, without psychosis    PTSD (post-traumatic stress disorder)    Aortic atherosclerosis    Iron deficiency anemia due to chronic blood loss    Syndrome of inappropriate secretion of antidiuretic hormone    Other nonthrombocytopenic purpura    Disorder of joint prosthesis    Secondary malignant neoplasm of intrathoracic lymph nodes    Recurrent adenocarcinoma of left lung    Shock, unspecified    Acute renal failure superimposed on stage 3 chronic kidney disease    Advance care planning    Anemia of chronic disease    Electrolyte abnormality    Hypovolemia dehydration    Pleural effusion     Interdisciplinary Rounds: did not attend  General Information Comments:   1/19: Patient reports good appetite, tolerating at this time. Currently on Cardiac Diet with 1000 mL fluid restriction. Diarrhea has improved. Patient does not like her diet 2/2 she can not have salt. Discussed the importance of low sodium diet r/t dx. Patient mentioned she sometimes has difficulty swallowing but summed it up to chewing too fast. Discussed with patient [employer] issues (wrong menu provided). Mild edema. PIV. Asad 18- R foot blisters, and buttocks dermatitis. NFPE completed 1/19 patient is nourished.    Nutrition Discharge Planning: dc on cardiac diet    Nutrition Risk Screen  Nutrition Risk Screen: no indicators present    Nutrition/Diet History  Spiritual, Cultural Beliefs, Protestant Practices, Values that Affect Care: no  Factors Affecting Nutritional Intake: None identified at this time    Anthropometrics  Temp: 97.8 °F (36.6 °C)  Height Method: Stated  Height: 5' 6" (167.6 cm)  Height (inches): 66 in  Weight Method: Estimated  Weight: 96.2 kg (212 lb 1.3 oz)  Weight (lb): 212.08 lb  Ideal Body Weight (IBW), Female: 130 lb  % Ideal Body Weight, Female (lb): 163.14 %  BMI (Calculated): 34.2  BMI Grade: 30 - 34.9- obesity - grade I   "     Lab/Procedures/Meds  Pertinent Labs Reviewed: reviewed  CBC:  No results for input(s): WBC, HGB, HCT, PLT in the last 24 hours.  CMP:  Recent Labs   Lab 01/19/23  0329 01/19/23  0409   CALCIUM 7.3* 7.3*   ALBUMIN 2.0* 1.8*   PROT 4.8*  --    * 130*   K 4.1 4.1   CO2 23 22*    102   BUN 23 23   CREATININE 1.1 1.0   ALKPHOS 76  --    ALT 14  --    AST 19  --    BILITOT 0.3  --      Pertinent Medications Reviewed: reviewed  Scheduled Meds:   albuterol-ipratropium  3 mL Nebulization Q4H WAKE    ascorbic acid (vitamin C)  250 mg Oral Daily    atorvastatin  40 mg Oral Daily    azelastine  1 spray Nasal BID    cefTRIAXone (ROCEPHIN) IVPB  1 g Intravenous Q24H    collagenase   Topical (Top) Daily    DULoxetine  60 mg Oral Daily    enoxaparin  40 mg Subcutaneous Daily    gabapentin  300 mg Oral TID    metoprolol succinate  50 mg Oral Daily    pantoprazole  40 mg Oral Daily    potassium chloride  20 mEq Oral TID    sodium bicarbonate  650 mg Oral BID    traZODone  100 mg Oral QHS    vitamin D  1,000 Units Oral Daily     Continuous Infusions:   sodium chloride 0.9% Stopped (01/18/23 1552)     PRN Meds:.acetaminophen, ALPRAZolam, aluminum-magnesium hydroxide-simethicone, guaiFENesin, HYDROcodone-acetaminophen, ondansetron, prochlorperazine, simethicone, sodium chloride 0.9%    Estimated/Assessed Needs  Weight Used For Calorie Calculations: 96.2 kg (212 lb 1.3 oz)  Energy Calorie Requirements (kcal): 2915-4163  Energy Need Method: Kcal/kg (20-25 kcal/kg based on hypoalbuminemia)  Protein Requirements: 77-96 (0.8-1.0 g/kg based on JONATHAN)  Weight Used For Protein Calculations: 96.2 kg (212 lb 1.3 oz)  Fluid Requirements (mL): 1 mL/kcal  Estimated Fluid Requirement Method:  (or per MD)  RDA Method (mL): 1924  CHO Requirement: 241g    Nutrition Prescription Ordered  Current Diet Order: cardiac; 1000 mL    Evaluation of Received Nutrient/Fluid Intake  I/O: + 2.5 L since admit  Energy Calories Required: meeting  needs  Protein Required: meeting needs  Fluid Required: meeting needs  Comments: LBM 1/18  Tolerance: tolerating  % Intake of Estimated Energy Needs: 75 - 100 %  % Meal Intake: 75 - 100 %  Intake/Output - Last 3 Shifts         01/17 0700 01/18 0659 01/18 0700  01/19 0659 01/19 0700 01/20 0659    P.O.  225 840    I.V. (mL/kg)  50 (0.5) 871.8 (9.1)    IV Piggyback 1605.9  398.6    Total Intake(mL/kg) 1605.9 (16.7) 275 (2.9) 2110.4 (21.9)    Urine (mL/kg/hr) 100  500 (0.5)    Emesis/NG output   0    Stool   0    Total Output 100  500    Net +1505.9 +275 +1610.4           Stool Occurrence  1 x 0 x    Emesis Occurrence  0 x 0 x          Nutrition Risk  Level of Risk/Frequency of Follow-up:  (1-2 x/week)     Monitor and Evaluation  Food and Nutrient Intake: energy intake, food and beverage intake  Food and Nutrient Adminstration: diet order  Knowledge/Beliefs/Attitudes: food and nutrition knowledge/skill  Physical Activity and Function: nutrition-related ADLs and IADLs, factors affecting access to physical activity  Anthropometric Measurements: weight, weight change, body mass index  Biochemical Data, Medical Tests and Procedures: electrolyte and renal panel, gastrointestinal profile, glucose/endocrine profile, inflammatory profile, lipid profile  Nutrition-Focused Physical Findings: overall appearance     Nutrition Follow-Up  RD Follow-up?: Yes  [place], Registration Eligible, Provisional LDN

## 2023-01-19 NOTE — ASSESSMENT & PLAN NOTE
Dysuria and suprapubic tenderness on admission.  UA with leukocytes +3 and nitrites and Urine culture pending.  Previous positive urine culture with E Coli in 2021, sensitive to Ceftriaxone.  Will treat for 3 days.   -Continue with IV Ceftriaxone - day #2/3  -Follow up Urine culture

## 2023-01-19 NOTE — PROGRESS NOTES
[facility] Medicine  Progress Note    Patient Name: Nalini Varma  MRN: [id number]  Patient Class: IP- Inpatient   Admission Date: 1/17/2023  Length of Stay: 2 days  Attending Physician: Paras Antonio MD  Primary Care Provider: Yane Sahni MD        Subjective:     Principal Problem:Hypovolemia dehydration        HPI:  Nalini Varma is a 72 year old female with PMHx of COPD (on home O2 3L) with 53 pack year smoking hx, current smoker, lung CA (currently on radiation and chemo) chronic anemia, anxiety, CKD Stage 3, systolic and diastolic heart failure (EF 55%, Grade II diastolic dysfunction, 12/24/22) presenting to ER for hypotension and increased shortness of breath for 6 days. Patient reports non-bloody diarrhea for 6 days, associated with loss of appetite, nausea, non-bloody vomiting, lethargy, chills. Patient also reports increased SOB but denies purulent sputum, sore throat or fever. Patient denies orthopnea or bilateral lower extremity swelling. Patient also reports dysuria and suprapubic tenderness.     H/H 8.4/25.4 elevated from baseline 7.7/23.3, likely due to hypovolemia. No leukocytosis but elevated lactate 2.3. UA with nitrites and +3 leukocytes. Hyponatremia 127 (baseline 133), hypocalcemia 8.2 (corrected for hypoalbuminemia 1.2). Cr elevated 1.4 from baseline 1.0, and an elevated AG 18. CXR with findings of  edema noting left pleural effusion. BNP elevated at 139. Patient was given IV calcium gluconate 1g, IV Mag sulfate 2g and IV NaCl 0.9% 1.5L in ER. Nephrology and Pulmonology was consulted. Patient was started on IV Ceftriaxone 1g for symptomatic UTI.    Patient is admitted to inpatient service with Hospital Medicine for management of electrolyte abnormalities, left pleural effusion and symptomatic UTI.       Overview/Hospital Course:  See below      Interval History: Patient is awake and alert this morning.  She is overall feeling slightly better.  Had a very  small soft BM yesterday morning and none since.       Review of Systems   Constitutional:  Positive for appetite change (improved) and fatigue (improved). Negative for chills and fever.   HENT:  Negative for congestion, ear pain and sore throat.    Eyes:  Negative for pain and redness.   Respiratory:  Positive for shortness of breath (stable on 3L O2NC at home). Negative for cough.    Cardiovascular:  Negative for chest pain and leg swelling.   Gastrointestinal:  Positive for diarrhea (resolved). Negative for nausea and vomiting.   Genitourinary:  Negative for dysuria and flank pain.   Musculoskeletal:  Positive for myalgias (improved). Negative for neck pain.   Skin:  Negative for rash and wound.   Neurological:  Negative for weakness, numbness and headaches.   Psychiatric/Behavioral:  Negative for agitation, behavioral problems and confusion. The patient is not nervous/anxious.    Objective:     Vital Signs (Most Recent):  Temp: 98.1 °F (36.7 °C) (01/19/23 0748)  Pulse: 79 (01/19/23 0838)  Resp: 14 (01/19/23 0838)  BP: 125/64 (01/19/23 0748)  SpO2: 98 % (01/19/23 0838)   Vital Signs (24h Range):  Temp:  [97.7 °F (36.5 °C)-98.5 °F (36.9 °C)] 98.1 °F (36.7 °C)  Pulse:  [73-91] 79  Resp:  [14-22] 14  SpO2:  [92 %-100 %] 98 %  BP: ()/(50-69) 125/64     Weight: 96.2 kg (212 lb)  Body mass index is 34.22 kg/m².    Intake/Output Summary (Last 24 hours) at 1/19/2023 1004  Last data filed at 1/19/2023 0734  Gross per 24 hour   Intake 1495.37 ml   Output 500 ml   Net 995.37 ml        Physical Exam  Vitals and nursing note reviewed.   Constitutional:       General: She is not in acute distress.     Appearance: She is obese. She is ill-appearing (chronically). She is not toxic-appearing.   HENT:      Head: Normocephalic and atraumatic.      Right Ear: External ear normal.      Left Ear: External ear normal.      Nose: Nose normal. No congestion or rhinorrhea.      Mouth/Throat:      Mouth: Mucous membranes are moist.       Pharynx: Oropharynx is clear.   Eyes:      General: No scleral icterus.        Right eye: No discharge.         Left eye: No discharge.      Pupils: Pupils are equal, round, and reactive to light.   Cardiovascular:      Rate and Rhythm: Normal rate and regular rhythm.      Pulses: Normal pulses.      Heart sounds: Normal heart sounds.   Pulmonary:      Effort: Pulmonary effort is normal.      Breath sounds: Normal breath sounds. Wheezes: expiratory wheezes - improved.      Comments: Stable on home 3L O2 NC  Abdominal:      General: Bowel sounds are normal. There is no distension.      Tenderness: There is no abdominal tenderness.   Musculoskeletal:      Cervical back: Normal range of motion.      Right lower leg: Edema (trace edema) present.      Left lower leg: Edema (trace edema) present.   Skin:     General: Skin is warm and dry.   Neurological:      General: No focal deficit present.      Mental Status: She is alert and oriented to person, place, and time. Mental status is at baseline.   Psychiatric:         Mood and Affect: Mood normal.         Behavior: Behavior normal.       Significant Labs: All pertinent labs within the past 24 hours have been reviewed.    Significant Imaging: I have reviewed all pertinent imaging results/findings within the past 24 hours.      Assessment/Plan:      * Hypovolemia dehydration  Patient reports non-bloody diarrhea for 6 days, associated with loss of appetite, nausea, non-bloody vomiting, lethargy, chills.  Her last chemotherapy was on 1/6/2023.  Creatinine was 1.4 on admission with significant electrolyte abnormalities, including Calcium, Magnesium, Phos.  Uric Acid was 13.2. Patient with known Hyponatremia due to SIADH and Na near her baseline. No significant diarrhea while in the ED or since admission.  Recent stool studies on 12/25/2023 negative.  Given 1.5L NS in ED and electrolytes replaced.  Feeling better this morning.  C Diff canceled given resolution of diarrhea.   "Appreciate Nephrology recommendations.  -Continue with IV fluids - consider d/c once po intake improves.  -Replace electrolytes as needed            Acute on chronic respiratory failure with hypoxia and hypercapnia  Patient presented with increased SOB from her baseline.  She still smokes 1ppd and on home O2 at 3L NC.  Likely from COPD exacerbation. CXR on admission with findings suggest edema noting left pleural effusion.  Increased parenchymal attenuation projects over the right upper lung zone, developing consolidation is not excluded noting the appearance is similar to previous exams.  Lactic Acid elevated at 2.3 and normalized.  .  Patient seen by Pulmonary and "The patient's working assessments include diarrhea, COPD, small L sided pleural effusion by POCUS.  Do not suspect the chronic effusion is operant in her current admit and do not advise tap.  Continue home COPD regimen".  Lung exam with much improved wheezing.    Plan:  Continue with O2 at tolerated to keep SpO2 >92%   Continue with Duonebs Q4 wake  Encourage smoking cessation  Strict intake and output      Urinary tract infection without hematuria  Dysuria and suprapubic tenderness on admission.  UA with leukocytes +3 and nitrites and Urine culture pending.  Previous positive urine culture with E Coli in 2021, sensitive to Ceftriaxone.  Will treat for 3 days.   -Continue with IV Ceftriaxone - day #2/3  -Follow up Urine culture    Acute renal failure superimposed on stage 3 chronic kidney disease  Elevated Creatinine of 1.4 on admission with a baseline of 1.0.  Likely due to hypovolemia from GI losses.  Appreciate Nephrology recommendations.  S/p IV fluids with improvement of creatinine back to 1.0.  -Continue IV fluids for now  - Avoid nephrotoxins, renally dose medication    Syndrome of inappropriate secretion of antidiuretic hormone  Na 127 on admission and stable at 129 this morning, which is near her baseline  -Monitor      Chronic combined " systolic and diastolic CHF (congestive heart failure)  TTE in 12/2022 with EF of 55% and Grade II left ventricular diastolic dysfunction.  .    -See above      Chronic obstructive pulmonary disease  As above      Electrolyte abnormality  Hyponatremia, hypokalemia, hypomagnesemia, hypocalcemia, hypoalbuminemia  Nephrology consulted, appreciate input. Urine osm, Na, Cr, uric acid, serum urice acid, TSH, BNP checked.     Plan:  - PO KCl 20 mEq TID   - IV Mag sulfate 2g replacement   - Consult dietitian for hypoalbuminemia   - Cardiac diet with 1L fluid restriction  - Q4 BMP with mag and phos  - Cardiac monitoring  - Strict intake and output  - IV NaCl 75ml/hr     Advance care planning  Patient wants to continue full code, with chest compressions and mechanical ventilation.      Recurrent adenocarcinoma of left lung  Patient is currently on chemo (last on 1/6/23) and radiation  Reports missing radiation the whole of last week      MDD (major depressive disorder), recurrent severe, without psychosis  Patient has persistent depression which is unknown and is currently controlled. Will Continue anti-depressant medications. We will not consult psychiatry at this time. Patient does not display psychosis at this time. Continue to monitor closely and adjust plan of care as needed.    - As above        Hypocalcemia  As above      Hyponatremia  As above      Debility  Patient mostly confined to Wheelchair after previously using Rolator at home.  Has chronic pain from OA of Knees, chronic LBP and Left Shoulder pain.  Deconditioned after her diarrheal illness as above.  She lives alone.  Will to work with PT.  -Consult PT.      Hypomagnesemia  As above      Generalized anxiety disorder  Hx of anxiety, MDD.  Chronic. Controlled.  - Alprazolam 0.25mg PRN  -Continue home meds of Duloxetine 60mg daily, Trazodone 100mg qhs PRN      Tobacco dependence  Smokes 1ppd.  Patient denies need for nicotine patches. Wants to quit cold  turkey.  -Encourage smoking cessation      Essential hypertension  Hypotensive from GI losses and sepsis secondary to UTI   -Continue with Metoprolol XL 50mg  -Continue to old home dose losartan 100mg PO daily      VTE Risk Mitigation (From admission, onward)         Ordered     enoxaparin injection 40 mg  Daily         01/17/23 2236     IP VTE HIGH RISK PATIENT  Once         01/17/23 2236     Place sequential compression device  Until discontinued         01/17/23 2236                Discharge Planning   FELICIANO:      Code Status: Full Code   Is the patient medically ready for discharge?:     Reason for patient still in hospital (select all that apply): Patient trending condition, Treatment and Consult recommendations  Discharge Plan A: Home                  Paras Antonio MD  Department of Hospital Medicine   [facility] ([place])

## 2023-01-19 NOTE — PLAN OF CARE
"Spoke with patient regarding therapy's recommendations for home health & hospital bed - patient denies the need for home health stating "i'm not sure what more I could do with PT" - patient would like hospital bed but reports "I don't have anyone to break down my bed & make room for it & honestly not sure it'd fit in my small apartment anyway"    "

## 2023-01-19 NOTE — ASSESSMENT & PLAN NOTE
Patient reports non-bloody diarrhea for 6 days, associated with loss of appetite, nausea, non-bloody vomiting, lethargy, chills.  Her last chemotherapy was on 1/6/2023.  Creatinine was 1.4 on admission with significant electrolyte abnormalities, including Calcium, Magnesium, Phos.  Uric Acid was 13.2. Patient with known Hyponatremia due to SIADH and Na near her baseline. No significant diarrhea while in the ED or since admission.  Recent stool studies on 12/25/2023 negative.  Given 1.5L NS in ED and electrolytes replaced.  Feeling better this morning.  C Diff canceled given resolution of diarrhea.  Appreciate Nephrology recommendations.  -Continue with IV fluids - consider d/c once po intake improves.  -Replace electrolytes as needed

## 2023-01-19 NOTE — ASSESSMENT & PLAN NOTE
Smokes 1ppd.  Patient denies need for nicotine patches. Wants to quit cold turkey.  -Encourage smoking cessation

## 2023-01-20 ENCOUNTER — PATIENT OUTREACH (OUTPATIENT)
Dept: ADMINISTRATIVE | Facility: OTHER | Age: 73
End: 2023-01-20
Payer: MEDICARE

## 2023-01-20 PROBLEM — I50.32 CHRONIC DIASTOLIC HEART FAILURE: Status: ACTIVE | Noted: 2018-09-17

## 2023-01-20 LAB
ALBUMIN SERPL BCP-MCNC: 1.9 G/DL (ref 3.5–5.2)
ANION GAP SERPL CALC-SCNC: 3 MMOL/L (ref 8–16)
BASOPHILS # BLD AUTO: 0 K/UL (ref 0–0.2)
BASOPHILS NFR BLD: 0 % (ref 0–1.9)
BUN SERPL-MCNC: 19 MG/DL (ref 8–23)
C DIFF GDH STL QL: POSITIVE
C DIFF TOX A+B STL QL IA: NEGATIVE
C DIFF TOX GENS STL QL NAA+PROBE: POSITIVE
CALCIUM SERPL-MCNC: 7.5 MG/DL (ref 8.7–10.5)
CHLORIDE SERPL-SCNC: 107 MMOL/L (ref 95–110)
CO2 SERPL-SCNC: 23 MMOL/L (ref 23–29)
CREAT SERPL-MCNC: 0.7 MG/DL (ref 0.5–1.4)
DIFFERENTIAL METHOD: ABNORMAL
EOSINOPHIL # BLD AUTO: 0 K/UL (ref 0–0.5)
EOSINOPHIL NFR BLD: 0.8 % (ref 0–8)
ERYTHROCYTE [DISTWIDTH] IN BLOOD BY AUTOMATED COUNT: 17.7 % (ref 11.5–14.5)
EST. GFR  (NO RACE VARIABLE): >60 ML/MIN/1.73 M^2
GLUCOSE SERPL-MCNC: 95 MG/DL (ref 70–110)
HCT VFR BLD AUTO: 23.4 % (ref 37–48.5)
HGB BLD-MCNC: 7.6 G/DL (ref 12–16)
IMM GRANULOCYTES # BLD AUTO: 0.05 K/UL (ref 0–0.04)
IMM GRANULOCYTES NFR BLD AUTO: 1.1 % (ref 0–0.5)
LYMPHOCYTES # BLD AUTO: 0.9 K/UL (ref 1–4.8)
LYMPHOCYTES NFR BLD: 19.7 % (ref 18–48)
MAGNESIUM SERPL-MCNC: 1.4 MG/DL (ref 1.6–2.6)
MCH RBC QN AUTO: 32.1 PG (ref 27–31)
MCHC RBC AUTO-ENTMCNC: 32.5 G/DL (ref 32–36)
MCV RBC AUTO: 99 FL (ref 82–98)
MONOCYTES # BLD AUTO: 0.5 K/UL (ref 0.3–1)
MONOCYTES NFR BLD: 11.5 % (ref 4–15)
NEUTROPHILS # BLD AUTO: 3.2 K/UL (ref 1.8–7.7)
NEUTROPHILS NFR BLD: 66.9 % (ref 38–73)
NRBC BLD-RTO: 0 /100 WBC
PHOSPHATE SERPL-MCNC: 2.4 MG/DL (ref 2.7–4.5)
PLATELET # BLD AUTO: 187 K/UL (ref 150–450)
PMV BLD AUTO: 9.9 FL (ref 9.2–12.9)
POTASSIUM SERPL-SCNC: 4.8 MMOL/L (ref 3.5–5.1)
RBC # BLD AUTO: 2.37 M/UL (ref 4–5.4)
SODIUM SERPL-SCNC: 133 MMOL/L (ref 136–145)
WBC # BLD AUTO: 4.71 K/UL (ref 3.9–12.7)

## 2023-01-20 PROCEDURE — 87493 C DIFF AMPLIFIED PROBE: CPT | Performed by: INTERNAL MEDICINE

## 2023-01-20 PROCEDURE — 25000003 PHARM REV CODE 250: Performed by: INTERNAL MEDICINE

## 2023-01-20 PROCEDURE — 87449 NOS EACH ORGANISM AG IA: CPT | Performed by: INTERNAL MEDICINE

## 2023-01-20 PROCEDURE — 25000003 PHARM REV CODE 250

## 2023-01-20 PROCEDURE — 27000207 HC ISOLATION

## 2023-01-20 PROCEDURE — 36415 COLL VENOUS BLD VENIPUNCTURE: CPT | Performed by: INTERNAL MEDICINE

## 2023-01-20 PROCEDURE — 99233 PR SUBSEQUENT HOSPITAL CARE,LEVL III: ICD-10-PCS | Mod: ,,, | Performed by: INTERNAL MEDICINE

## 2023-01-20 PROCEDURE — 63600175 PHARM REV CODE 636 W HCPCS

## 2023-01-20 PROCEDURE — 85025 COMPLETE CBC W/AUTO DIFF WBC: CPT | Performed by: INTERNAL MEDICINE

## 2023-01-20 PROCEDURE — 21400001 HC TELEMETRY ROOM

## 2023-01-20 PROCEDURE — 94761 N-INVAS EAR/PLS OXIMETRY MLT: CPT

## 2023-01-20 PROCEDURE — 94640 AIRWAY INHALATION TREATMENT: CPT

## 2023-01-20 PROCEDURE — 99223 PR INITIAL HOSPITAL CARE,LEVL III: ICD-10-PCS | Mod: ,,, | Performed by: INTERNAL MEDICINE

## 2023-01-20 PROCEDURE — 27000221 HC OXYGEN, UP TO 24 HOURS

## 2023-01-20 PROCEDURE — 83735 ASSAY OF MAGNESIUM: CPT | Performed by: INTERNAL MEDICINE

## 2023-01-20 PROCEDURE — 99900035 HC TECH TIME PER 15 MIN (STAT)

## 2023-01-20 PROCEDURE — 25000242 PHARM REV CODE 250 ALT 637 W/ HCPCS

## 2023-01-20 PROCEDURE — 63600175 PHARM REV CODE 636 W HCPCS: Performed by: INTERNAL MEDICINE

## 2023-01-20 PROCEDURE — 80069 RENAL FUNCTION PANEL: CPT | Performed by: INTERNAL MEDICINE

## 2023-01-20 PROCEDURE — 99233 SBSQ HOSP IP/OBS HIGH 50: CPT | Mod: ,,, | Performed by: INTERNAL MEDICINE

## 2023-01-20 PROCEDURE — 99223 1ST HOSP IP/OBS HIGH 75: CPT | Mod: ,,, | Performed by: INTERNAL MEDICINE

## 2023-01-20 RX ORDER — LANOLIN ALCOHOL/MO/W.PET/CERES
400 CREAM (GRAM) TOPICAL 2 TIMES DAILY
Status: COMPLETED | OUTPATIENT
Start: 2023-01-20 | End: 2023-01-22

## 2023-01-20 RX ADMIN — SODIUM BICARBONATE 650 MG TABLET 650 MG: at 09:01

## 2023-01-20 RX ADMIN — GABAPENTIN 300 MG: 300 CAPSULE ORAL at 03:01

## 2023-01-20 RX ADMIN — ENOXAPARIN SODIUM 40 MG: 40 INJECTION SUBCUTANEOUS at 05:01

## 2023-01-20 RX ADMIN — IPRATROPIUM BROMIDE AND ALBUTEROL SULFATE 3 ML: 2.5; .5 SOLUTION RESPIRATORY (INHALATION) at 11:01

## 2023-01-20 RX ADMIN — Medication 1000 UNITS: at 08:01

## 2023-01-20 RX ADMIN — TRAZODONE HYDROCHLORIDE 100 MG: 100 TABLET ORAL at 09:01

## 2023-01-20 RX ADMIN — GABAPENTIN 300 MG: 300 CAPSULE ORAL at 09:01

## 2023-01-20 RX ADMIN — METOPROLOL SUCCINATE 50 MG: 50 TABLET, EXTENDED RELEASE ORAL at 08:01

## 2023-01-20 RX ADMIN — HYDROCODONE BITARTRATE AND ACETAMINOPHEN 1 TABLET: 10; 325 TABLET ORAL at 01:01

## 2023-01-20 RX ADMIN — Medication 400 MG: at 01:01

## 2023-01-20 RX ADMIN — POTASSIUM CHLORIDE 20 MEQ: 1500 TABLET, EXTENDED RELEASE ORAL at 09:01

## 2023-01-20 RX ADMIN — AZELASTINE HYDROCHLORIDE 137 MCG: 137 SPRAY, METERED NASAL at 08:01

## 2023-01-20 RX ADMIN — IPRATROPIUM BROMIDE AND ALBUTEROL SULFATE 3 ML: 2.5; .5 SOLUTION RESPIRATORY (INHALATION) at 07:01

## 2023-01-20 RX ADMIN — SIMETHICONE 80 MG: 80 TABLET, CHEWABLE ORAL at 08:01

## 2023-01-20 RX ADMIN — IPRATROPIUM BROMIDE AND ALBUTEROL SULFATE 3 ML: 2.5; .5 SOLUTION RESPIRATORY (INHALATION) at 04:01

## 2023-01-20 RX ADMIN — ATORVASTATIN CALCIUM 40 MG: 20 TABLET, FILM COATED ORAL at 08:01

## 2023-01-20 RX ADMIN — GABAPENTIN 300 MG: 300 CAPSULE ORAL at 08:01

## 2023-01-20 RX ADMIN — SIMETHICONE 80 MG: 80 TABLET, CHEWABLE ORAL at 01:01

## 2023-01-20 RX ADMIN — SODIUM CHLORIDE: 9 INJECTION, SOLUTION INTRAVENOUS at 09:01

## 2023-01-20 RX ADMIN — DULOXETINE 60 MG: 30 CAPSULE, DELAYED RELEASE ORAL at 08:01

## 2023-01-20 RX ADMIN — SODIUM BICARBONATE 650 MG TABLET 650 MG: at 08:01

## 2023-01-20 RX ADMIN — POTASSIUM CHLORIDE 20 MEQ: 1500 TABLET, EXTENDED RELEASE ORAL at 08:01

## 2023-01-20 RX ADMIN — AZELASTINE HYDROCHLORIDE 137 MCG: 137 SPRAY, METERED NASAL at 09:01

## 2023-01-20 RX ADMIN — COLLAGENASE SANTYL: 250 OINTMENT TOPICAL at 08:01

## 2023-01-20 RX ADMIN — CEFTRIAXONE 1 G: 1 INJECTION, SOLUTION INTRAVENOUS at 05:01

## 2023-01-20 RX ADMIN — POTASSIUM CHLORIDE 20 MEQ: 1500 TABLET, EXTENDED RELEASE ORAL at 03:01

## 2023-01-20 RX ADMIN — Medication 400 MG: at 09:01

## 2023-01-20 RX ADMIN — PANTOPRAZOLE SODIUM 40 MG: 40 TABLET, DELAYED RELEASE ORAL at 08:01

## 2023-01-20 RX ADMIN — Medication 250 MG: at 08:01

## 2023-01-20 NOTE — ASSESSMENT & PLAN NOTE
Dysuria and suprapubic tenderness on admission.  UA with leukocytes +3 and nitrites and Urine culture pending.  Previous positive urine culture with E Coli in 2021, sensitive to Ceftriaxone.  Will treat for 3 days.   -Continue with IV Ceftriaxone - day #3/3  -Follow up Urine culture

## 2023-01-20 NOTE — ASSESSMENT & PLAN NOTE
Smokes 1ppd.  Patient denies need for nicotine patches. Wants to quit cold turkey.  Discussed dangers of smoking with use of home oxygen.  -Continue to encourage smoking cessation

## 2023-01-20 NOTE — ASSESSMENT & PLAN NOTE
Hypotensive from GI losses and sepsis secondary to UTI   -Continue with Metoprolol XL 50mg  -Continue to hold home dose losartan 100mg PO daily

## 2023-01-20 NOTE — SUBJECTIVE & OBJECTIVE
Interval History: Patient is awake and alert this morning.  She had 2 watery BM's this morning with some mild abdominal cramps.  No blood.  No fevers.  Stable breathing.     Review of Systems   Constitutional:  Positive for appetite change (improved) and fatigue (improving). Negative for chills and fever.   HENT:  Negative for congestion, ear pain and sore throat.    Eyes:  Negative for pain and redness.   Respiratory:  Positive for shortness of breath (stable on 3L O2NC at home). Negative for cough.    Cardiovascular:  Negative for chest pain and leg swelling.   Gastrointestinal:  Positive for diarrhea (recurrent). Negative for nausea and vomiting.   Genitourinary:  Negative for dysuria and flank pain.   Musculoskeletal:  Positive for arthralgias (chronic). Negative for myalgias and neck pain.   Skin:  Negative for rash and wound.   Neurological:  Negative for weakness, numbness and headaches.   Psychiatric/Behavioral:  Negative for agitation, behavioral problems and confusion. The patient is not nervous/anxious.    Objective:     Vital Signs (Most Recent):  Temp: 97.4 °F (36.3 °C) (01/20/23 0748)  Pulse: 67 (01/20/23 1130)  Resp: 17 (01/20/23 1130)  BP: 135/83 (01/20/23 0748)  SpO2: 96 % (01/20/23 1130)   Vital Signs (24h Range):  Temp:  [97.4 °F (36.3 °C)-98.3 °F (36.8 °C)] 97.4 °F (36.3 °C)  Pulse:  [67-87] 67  Resp:  [16-20] 17  SpO2:  [96 %-100 %] 96 %  BP: (103-135)/(56-83) 135/83     Weight: 96.2 kg (212 lb 1.3 oz)  Body mass index is 34.23 kg/m².    Intake/Output Summary (Last 24 hours) at 1/20/2023 1157  Last data filed at 1/20/2023 0516  Gross per 24 hour   Intake 889.37 ml   Output 425 ml   Net 464.37 ml        Physical Exam  Vitals and nursing note reviewed.   Constitutional:       General: She is not in acute distress.     Appearance: She is obese. She is ill-appearing (chronically). She is not toxic-appearing.   HENT:      Head: Normocephalic and atraumatic.      Right Ear: External ear normal.       Left Ear: External ear normal.      Nose: Nose normal. No congestion or rhinorrhea.      Mouth/Throat:      Mouth: Mucous membranes are moist.      Pharynx: Oropharynx is clear.   Eyes:      General: No scleral icterus.        Right eye: No discharge.         Left eye: No discharge.      Pupils: Pupils are equal, round, and reactive to light.   Cardiovascular:      Rate and Rhythm: Normal rate and regular rhythm.      Pulses: Normal pulses.      Heart sounds: Normal heart sounds.   Pulmonary:      Effort: Pulmonary effort is normal.      Breath sounds: Normal breath sounds. No wheezing or rales.      Comments: Stable on home 3L O2 NC  Abdominal:      General: Bowel sounds are normal. There is no distension.      Tenderness: There is no abdominal tenderness.   Musculoskeletal:      Cervical back: Normal range of motion.      Right lower leg: Edema (trace edema) present.      Left lower leg: Edema (trace edema) present.   Skin:     General: Skin is warm and dry.   Neurological:      General: No focal deficit present.      Mental Status: She is alert and oriented to person, place, and time. Mental status is at baseline.   Psychiatric:         Mood and Affect: Mood normal.         Behavior: Behavior normal.       Significant Labs: All pertinent labs within the past 24 hours have been reviewed.    Significant Imaging: I have reviewed all pertinent imaging results/findings within the past 24 hours.

## 2023-01-20 NOTE — ASSESSMENT & PLAN NOTE
"Patient presented with increased SOB from her baseline.  She still smokes 1ppd and on home O2 at 3L NC.  Likely from COPD exacerbation. CXR on admission with findings suggest edema noting left pleural effusion.  Increased parenchymal attenuation projects over the right upper lung zone, developing consolidation is not excluded noting the appearance is similar to previous exams.  Labs on admission with Lactic Acid elevated at 2.3 and normalized.  .      Patient seen by Pulmonary and "The patient's working assessments include diarrhea, COPD, small L sided pleural effusion by POCUS.  Do not suspect the chronic effusion is operant in her current admit and do not advise tap.  Continue home COPD regimen".  Lung exam with much improved wheezing.    Plan:  Continue with O2 at tolerated to keep SpO2 >92%   Continue with Duonebs Q4 wake  Encourage smoking cessation  Strict intake and output    "

## 2023-01-20 NOTE — PLAN OF CARE
Palliative Care MD reports patient now agreeable to home health - will set up once ready for discharge

## 2023-01-20 NOTE — PLAN OF CARE
POC reviewed with patient. AAOx4. Purposeful rounding completed. Cardiac monitor maintained. IV fluid in progress. O2 2L via NC. Complaints of pain treated with PRN pain medication according to MAR. No other complaints verbalized during shift. No injuries, falls, or trauma occurred during shift.  Bed low and locked, with SR up x 2. Call light placed within easy reach. Safety maintained. Patient in no apparent distress.     Problem: Adult Inpatient Plan of Care  Goal: Plan of Care Review  Outcome: Ongoing, Progressing  Goal: Patient-Specific Goal (Individualized)  Outcome: Ongoing, Progressing  Goal: Absence of Hospital-Acquired Illness or Injury  Outcome: Ongoing, Progressing  Goal: Optimal Comfort and Wellbeing  Outcome: Ongoing, Progressing  Goal: Readiness for Transition of Care  Outcome: Ongoing, Progressing     Problem: Fall Injury Risk  Goal: Absence of Fall and Fall-Related Injury  Outcome: Ongoing, Progressing

## 2023-01-20 NOTE — PLAN OF CARE
POC reviewed w/ pt. AAOx4. Cardiac monitor maintained. VSS on 1L NC. C/o pain treated w/ PRN pain medication per MAR. Stool sample sent off for Cdiff. Pending collection for other stool sample, will pass along in report. Turn Q2/frequent weight shift assistance provided. Instructed to call for help ambulating. No injuries, falls, or trauma occurred during shift. Purposeful rounding completed. Bed alarm set, bed low and locked, side rails up x3, call light within reach.

## 2023-01-20 NOTE — ASSESSMENT & PLAN NOTE
Patient is currently on chemo (last on 1/6/23) and radiation.  Reports missing radiation the whole of last week

## 2023-01-20 NOTE — SUBJECTIVE & OBJECTIVE
Past Medical History:   Diagnosis Date    Anxiety     Cervical spinal stenosis     Choledocholithiasis     Chronic obstructive pulmonary disease 03/16/2016    Degenerative disc disease of cervical spine     Diverticulosis 04/24/2018    History of alcohol abuse 03/02/2021    History of gastric ulcer     History of L3 compression fracture 12/19/2018    History of lung cancer     s/p completion of XRT in 10/2020    Hyperlipidemia     Iron deficiency anemia     Osteoarthritis     Stage III CKD 2017       Past Surgical History:   Procedure Laterality Date    BREAST CYST EXCISION Right     1967    CATARACT EXTRACTION      COLONOSCOPY  2015    COLONOSCOPY N/A 6/8/2022    Procedure: COLONOSCOPY;  Surgeon: Helio Cheema MD;  Location: [facility] ([address] [place] [address]);  Service: Endoscopy;  Laterality: N/A;  5/25-Pt requesting Dr. Cheema-approval given per Dr. Cheema-ml  Fully vaccinated, prep instr portal -ml    CORONARY ANGIOGRAPHY N/A 7/20/2018    Procedure: ANGIOGRAM, CORONARY ARTERY;  Surgeon: Willard Roberts MD;  Location: [facility], [place], operated by [facility] CATH LAB;  Service: Cardiovascular;  Laterality: N/A;    ENDOSCOPIC ULTRASOUND OF UPPER GASTROINTESTINAL TRACT N/A 3/24/2021    Procedure: ULTRASOUND, UPPER GI TRACT, ENDOSCOPIC;  Surgeon: Helio Cheema MD;  Location: [address]);  Service: Endoscopy;  Laterality: N/A;    ENDOSCOPIC ULTRASOUND OF UPPER GASTROINTESTINAL TRACT N/A 4/25/2022    Procedure: ULTRASOUND, UPPER GI TRACT, ENDOSCOPIC;  Surgeon: Helio Cheema MD;  [address]  Service: Endoscopy;  Laterality: N/A;  cardiac risk assessment 1 per Dr. Ortez. see t/e 3/17-SC  3/25:new instructions via portal. home with medical transport?-SC    ERCP N/A 3/24/2021    Procedure: ERCP (ENDOSCOPIC RETROGRADE CHOLANGIOPANCREATOGRAPHY);  Surgeon: Helio Cheema MD;  [facility]  Service: Endoscopy;  Laterality: N/A;    ERCP N/A 4/30/2021    Procedure: ERCP (ENDOSCOPIC RETROGRADE  CHOLANGIOPANCREATOGRAPHY);  Surgeon: Boo Gray MD;  Location: [facility] ENDO (2ND FLR);  Service: Endoscopy;  Laterality: N/A;    ERCP N/A 7/1/2021    Procedure: ERCP (ENDOSCOPIC RETROGRADE CHOLANGIOPANCREATOGRAPHY);  Surgeon: Helio Cheema MD;  Location: [facility] ENDO (2ND FLR);  Service: Endoscopy;  Laterality: N/A;  Ok for Taxi. Dr Cheema  rapid covid 1130am- tb inst email    ESOPHAGOGASTRODUODENOSCOPY  2015    ESOPHAGOGASTRODUODENOSCOPY N/A 3/4/2021    Procedure: EGD (ESOPHAGOGASTRODUODENOSCOPY);  Surgeon: Yenifer Ramirez MD;  Location: [facility];  Service: Endoscopy;  Laterality: N/A;    ESOPHAGOGASTRODUODENOSCOPY N/A 3/24/2021    Procedure: EGD (ESOPHAGOGASTRODUODENOSCOPY);  Surgeon: Helio Cheema MD;  Location: [facility] (2ND FLR);  Service: Endoscopy;  Laterality: N/A;    EYE SURGERY  2016    Cataracts    FRACTURE SURGERY  2014    JOINT REPLACEMENT  2007    ORIF FEMUR FRACTURE Left     TOTAL KNEE ARTHROPLASTY Left 2007    TUBAL LIGATION         Review of patient's allergies indicates:   Allergen Reactions    Wellbutrin [bupropion hcl] Other (See Comments)     Hyponatremia and hypomagnesia     Zoloft [sertraline] Other (See Comments)     Hyponatremia and hypomagnesia     Bananas [banana]      Emesis, and stomach cramps    Patient states she is not allergic to Banana       Medications:  Continuous Infusions:   sodium chloride 0.9% Stopped (01/18/23 1552)     Scheduled Meds:   albuterol-ipratropium  3 mL Nebulization Q4H WAKE    ascorbic acid (vitamin C)  250 mg Oral Daily    atorvastatin  40 mg Oral Daily    azelastine  1 spray Nasal BID    cefTRIAXone (ROCEPHIN) IVPB  1 g Intravenous Q24H    collagenase   Topical (Top) Daily    DULoxetine  60 mg Oral Daily    enoxaparin  40 mg Subcutaneous Daily    gabapentin  300 mg Oral TID    magnesium oxide  400 mg Oral BID    metoprolol succinate  50 mg Oral Daily    pantoprazole  40 mg Oral Daily    potassium chloride  20 mEq Oral TID    sodium bicarbonate  650  mg Oral BID    traZODone  100 mg Oral QHS    vitamin D  1,000 Units Oral Daily     PRN Meds:acetaminophen, ALPRAZolam, aluminum-magnesium hydroxide-simethicone, guaiFENesin, HYDROcodone-acetaminophen, ondansetron, prochlorperazine, simethicone, sodium chloride 0.9%    Family History       Problem Relation (Age of Onset)    Alzheimer's disease Mother    Arthritis Father, Brother    Cancer Sister    Colon cancer Brother    Dementia Mother    Depression Mother    Hypertension Mother, Brother    Miscarriages / Stillbirths Sister    Myasthenia gravis Father    No Known Problems Son    Rectal cancer Father          Tobacco Use    Smoking status: Some Days     Packs/day: 1.00     Years: 53.00     Pack years: 53.00     Types: Cigarettes     Start date: 4/30/1967    Smokeless tobacco: Never    Tobacco comments:     I had quit for about 6 months this past year. Then massive stress and started back up sometimes   Substance and Sexual Activity    Alcohol use: Yes     Alcohol/week: 7.0 standard drinks     Types: 7 Drinks containing 0.5 oz of alcohol per week     Comment: at least 1 cocktail a day    Drug use: No    Sexual activity: Not Currently     Partners: Male     Birth control/protection: Abstinence, Post-menopausal       Review of Systems   Constitutional:  Positive for fatigue.   HENT:  Negative for congestion.    Respiratory:  Positive for shortness of breath.    Gastrointestinal:  Positive for diarrhea. Negative for blood in stool.   Genitourinary:  Negative for difficulty urinating.   Musculoskeletal:  Negative for back pain.   Neurological:  Negative for seizures.   Psychiatric/Behavioral:  Negative for confusion.    Objective:     Vital Signs (Most Recent):  Temp: 97.9 °F (36.6 °C) (01/20/23 1201)  Pulse: 86 (01/20/23 1201)  Resp: 16 (01/20/23 1201)  BP: (!) 147/74 (01/20/23 1201)  SpO2: 97 % (01/20/23 1201)   Vital Signs (24h Range):  Temp:  [97.4 °F (36.3 °C)-98.3 °F (36.8 °C)] 97.9 °F (36.6 °C)  Pulse:  [67-87]  86  Resp:  [16-20] 16  SpO2:  [96 %-100 %] 97 %  BP: (103-147)/(56-83) 147/74     Weight: 96.2 kg (212 lb 1.3 oz)  Body mass index is 34.23 kg/m².    Physical Exam  Constitutional:       Comments: Chronically ill-appearing, sitting up in bed, in no distress, pleasant and conversational    HENT:      Head:      Comments: Trace temporal wasting      Nose: Nose normal.      Mouth/Throat:      Mouth: Mucous membranes are moist.   Eyes:      Extraocular Movements: Extraocular movements intact.   Cardiovascular:      Rate and Rhythm: Normal rate.   Pulmonary:      Comments: No conversational dyspnea, on NC oxygen   Skin:     General: Skin is warm.   Neurological:      General: No focal deficit present.      Mental Status: She is alert and oriented to person, place, and time.   Psychiatric:         Mood and Affect: Mood normal.         Behavior: Behavior normal.     Significant Labs: All pertinent labs within the past 24 hours have been reviewed.  CBC:   Recent Labs   Lab 01/20/23  0635   WBC 4.71   HGB 7.6*   HCT 23.4*   MCV 99*        BMP:  Recent Labs   Lab 01/20/23  0635   GLU 95   *   K 4.8      CO2 23   BUN 19   CREATININE 0.7   CALCIUM 7.5*   MG 1.4*     LFT:  Lab Results   Component Value Date    AST 19 01/19/2023    ALKPHOS 76 01/19/2023    BILITOT 0.3 01/19/2023     Albumin:   Albumin   Date Value Ref Range Status   01/20/2023 1.9 (L) 3.5 - 5.2 g/dL Final     Protein:   Total Protein   Date Value Ref Range Status   01/19/2023 4.8 (L) 6.0 - 8.4 g/dL Final     Lactic acid:   Lab Results   Component Value Date    LACTATE 0.7 01/17/2023    LACTATE 2.3 (H) 01/17/2023       Significant Imaging: I have reviewed all pertinent imaging results/findings within the past 24 hours.

## 2023-01-20 NOTE — PROGRESS NOTES
[facility]  Progress Note    Patient Name: Nalini Varma  MRN: [id number]  Patient Class: IP- Inpatient   Admission Date: 1/17/2023  Length of Stay: 3 days  Attending Physician: Paras Antonio MD  Primary Care Provider: Yane Sahni MD        Subjective:     Principal Problem:Hypovolemia dehydration      HPI:  Nalini Varma is a 72 year old female with PMHx of COPD (on home O2 3L) with 53 pack year smoking hx, current smoker, lung CA (currently on radiation and chemo) chronic anemia, anxiety, CKD Stage 3, systolic and diastolic heart failure (EF 55%, Grade II diastolic dysfunction, 12/24/22) presenting to ER for hypotension and increased shortness of breath for 6 days. Patient reports non-bloody diarrhea for 6 days, associated with loss of appetite, nausea, non-bloody vomiting, lethargy, chills. Patient also reports increased SOB but denies purulent sputum, sore throat or fever. Patient denies orthopnea or bilateral lower extremity swelling. Patient also reports dysuria and suprapubic tenderness.     H/H 8.4/25.4 elevated from baseline 7.7/23.3, likely due to hypovolemia. No leukocytosis but elevated lactate 2.3. UA with nitrites and +3 leukocytes. Hyponatremia 127 (baseline 133), hypocalcemia 8.2 (corrected for hypoalbuminemia 1.2). Cr elevated 1.4 from baseline 1.0, and an elevated AG 18. CXR with findings of  edema noting left pleural effusion. BNP elevated at 139. Patient was given IV calcium gluconate 1g, IV Mag sulfate 2g and IV NaCl 0.9% 1.5L in ER. Nephrology and Pulmonology was consulted. Patient was started on IV Ceftriaxone 1g for symptomatic UTI.    Patient is admitted to inpatient service with Hospital Medicine for management of electrolyte abnormalities, left pleural effusion and symptomatic UTI.       Overview/Hospital Course:  See below      Interval History: Patient is awake and alert this morning.  She had 2 watery BM's this morning with some mild  abdominal cramps.  No blood.  No fevers.  Stable breathing.     Review of Systems   Constitutional:  Positive for appetite change (improved) and fatigue (improving). Negative for chills and fever.   HENT:  Negative for congestion, ear pain and sore throat.    Eyes:  Negative for pain and redness.   Respiratory:  Positive for shortness of breath (stable on 3L O2NC at home). Negative for cough.    Cardiovascular:  Negative for chest pain and leg swelling.   Gastrointestinal:  Positive for diarrhea (recurrent). Negative for nausea and vomiting.   Genitourinary:  Negative for dysuria and flank pain.   Musculoskeletal:  Positive for arthralgias (chronic). Negative for myalgias and neck pain.   Skin:  Negative for rash and wound.   Neurological:  Negative for weakness, numbness and headaches.   Psychiatric/Behavioral:  Negative for agitation, behavioral problems and confusion. The patient is not nervous/anxious.    Objective:     Vital Signs (Most Recent):  Temp: 97.4 °F (36.3 °C) (01/20/23 0748)  Pulse: 67 (01/20/23 1130)  Resp: 17 (01/20/23 1130)  BP: 135/83 (01/20/23 0748)  SpO2: 96 % (01/20/23 1130)   Vital Signs (24h Range):  Temp:  [97.4 °F (36.3 °C)-98.3 °F (36.8 °C)] 97.4 °F (36.3 °C)  Pulse:  [67-87] 67  Resp:  [16-20] 17  SpO2:  [96 %-100 %] 96 %  BP: (103-135)/(56-83) 135/83     Weight: 96.2 kg (212 lb 1.3 oz)  Body mass index is 34.23 kg/m².    Intake/Output Summary (Last 24 hours) at 1/20/2023 1157  Last data filed at 1/20/2023 0516  Gross per 24 hour   Intake 889.37 ml   Output 425 ml   Net 464.37 ml        Physical Exam  Vitals and nursing note reviewed.   Constitutional:       General: She is not in acute distress.     Appearance: She is obese. She is ill-appearing (chronically). She is not toxic-appearing.   HENT:      Head: Normocephalic and atraumatic.      Right Ear: External ear normal.      Left Ear: External ear normal.      Nose: Nose normal. No congestion or rhinorrhea.      Mouth/Throat:       Mouth: Mucous membranes are moist.      Pharynx: Oropharynx is clear.   Eyes:      General: No scleral icterus.        Right eye: No discharge.         Left eye: No discharge.      Pupils: Pupils are equal, round, and reactive to light.   Cardiovascular:      Rate and Rhythm: Normal rate and regular rhythm.      Pulses: Normal pulses.      Heart sounds: Normal heart sounds.   Pulmonary:      Effort: Pulmonary effort is normal.      Breath sounds: Normal breath sounds. No wheezing or rales.      Comments: Stable on home 3L O2 NC  Abdominal:      General: Bowel sounds are normal. There is no distension.      Tenderness: There is no abdominal tenderness.   Musculoskeletal:      Cervical back: Normal range of motion.      Right lower leg: Edema (trace edema) present.      Left lower leg: Edema (trace edema) present.   Skin:     General: Skin is warm and dry.   Neurological:      General: No focal deficit present.      Mental Status: She is alert and oriented to person, place, and time. Mental status is at baseline.   Psychiatric:         Mood and Affect: Mood normal.         Behavior: Behavior normal.       Significant Labs: All pertinent labs within the past 24 hours have been reviewed.    Significant Imaging: I have reviewed all pertinent imaging results/findings within the past 24 hours.      Assessment/Plan:      * Acute Diarrhea with Volume Depletion and Electrolyte Abnormalities  Patient reports non-bloody diarrhea for 6 days, associated with loss of appetite, nausea, non-bloody vomiting, lethargy, chills.  Her last chemotherapy was on 1/6/2023.  Creatinine was 1.4 on admission with significant electrolyte abnormalities, including Calcium, Magnesium, Phos.  Uric Acid was 13.2. Patient with known Hyponatremia due to SIADH and Na near her baseline. No significant diarrhea while in the ED or since admission.  Recent stool studies on 12/25/2023 negative.  Given 1.5L NS in ED and electrolytes replaced.  Diarrhea had  "resolved for over 24 hours, but recurrent this morning x 2 episode (watery and cramping).  C Diff canceled given resolution of diarrhea, but now will need to reorder.  Appreciate Nephrology recommendations.    Plan:  Check Stool for C Diff and Cultures  Check Stool WBC - if negative, can start with imodium  Continue with IV fluids given recurrent diarrhea  Replace electrolytes as needed            Acute on chronic respiratory failure with hypoxia and hypercapnia  Patient presented with increased SOB from her baseline.  She still smokes 1ppd and on home O2 at 3L NC.  Likely from COPD exacerbation. CXR on admission with findings suggest edema noting left pleural effusion.  Increased parenchymal attenuation projects over the right upper lung zone, developing consolidation is not excluded noting the appearance is similar to previous exams.  Labs on admission with Lactic Acid elevated at 2.3 and normalized.  .      Patient seen by Pulmonary and "The patient's working assessments include diarrhea, COPD, small L sided pleural effusion by POCUS.  Do not suspect the chronic effusion is operant in her current admit and do not advise tap.  Continue home COPD regimen".  Lung exam with much improved wheezing.    Plan:  Continue with O2 at tolerated to keep SpO2 >92%   Continue with Duonebs Q4 wake  Encourage smoking cessation  Strict intake and output      Urinary tract infection without hematuria  Dysuria and suprapubic tenderness on admission.  UA with leukocytes +3 and nitrites and Urine culture pending.  Previous positive urine culture with E Coli in 2021, sensitive to Ceftriaxone.  Will treat for 3 days.   -Continue with IV Ceftriaxone - day #3/3  -Follow up Urine culture    Acute renal failure superimposed on stage 3 chronic kidney disease  Elevated Creatinine of 1.4 on admission with a baseline of 1.0.  Likely due to hypovolemia from GI losses.  Appreciate Nephrology recommendations.  S/p IV fluids with improvement " of creatinine back to normal - 0.7 today  -Continue IV fluids for now given recurrent diarrhea  - Avoid nephrotoxins, renally dose medication    Syndrome of inappropriate secretion of antidiuretic hormone  Na 127 on admission and stable at 133 this morning, which is near her baseline  -Monitor      Chronic diastolic heart failure  TTE in 12/2022 with EF of 55% and Grade II left ventricular diastolic dysfunction.  .    -See above      Chronic obstructive pulmonary disease  As above      Electrolyte abnormality  Hyponatremia, hypokalemia, hypomagnesemia, hypocalcemia, hypoalbuminemia  Nephrology consulted, appreciate input. Urine osm, Na, Cr, uric acid, serum urice acid, TSH, BNP checked.     Plan:  - PO KCl 20 mEq TID   - IV Mag sulfate 2g replacement   - Consult dietitian for hypoalbuminemia   - Cardiac diet with 1L fluid restriction  - Q4 BMP with mag and phos  - Cardiac monitoring  - Strict intake and output  - IV NaCl 75ml/hr     Advance care planning  Patient wants to continue full code, with chest compressions and mechanical ventilation.      Recurrent adenocarcinoma of left lung  Patient is currently on chemo (last on 1/6/23) and radiation.  Reports missing radiation the whole of last week      MDD (major depressive disorder), recurrent severe, without psychosis  Patient has persistent depression which is unknown and is currently controlled. Will Continue anti-depressant medications. We will not consult psychiatry at this time. Patient does not display psychosis at this time. Continue to monitor closely and adjust plan of care as needed.    - As above        Hypocalcemia  As above      Hyponatremia  As above      Debility  Patient mostly confined to Wheelchair after previously using Rolator at home.  Has chronic pain from OA of Knees, chronic LBP and Left Shoulder pain. Deconditioned after her diarrheal illness as above.  She lives alone.    -Consulted PT - recommends home PT on  discharge      Hypomagnesemia  As above      Generalized anxiety disorder  Hx of anxiety, MDD - Chronic. Controlled.  -Continue with Alprazolam 0.25mg PRN  -Continue home meds of Duloxetine 60mg daily, Trazodone 100mg qhs PRN      Tobacco dependence  Smokes 1ppd.  Patient denies need for nicotine patches. Wants to quit cold turkey.  Discussed dangers of smoking with use of home oxygen.  -Continue to encourage smoking cessation      Essential hypertension  Hypotensive from GI losses and sepsis secondary to UTI   -Continue with Metoprolol XL 50mg  -Continue to hold home dose losartan 100mg PO daily        VTE Risk Mitigation (From admission, onward)         Ordered     enoxaparin injection 40 mg  Daily         01/17/23 2236     IP VTE HIGH RISK PATIENT  Once         01/17/23 2236     Place sequential compression device  Until discontinued         01/17/23 2236                Discharge Planning   FELICIANO:      Code Status: Full Code   Is the patient medically ready for discharge?:     Reason for patient still in hospital (select all that apply): Patient trending condition, Treatment and Consult recommendations  Discharge Plan A: Home                  Paras Antonio MD  Department of Hospital Medicine   [facility] ([place])

## 2023-01-20 NOTE — PLAN OF CARE
[facility] reports hospital bed approved - spoke with patient who will contact them when she's ready for delivery - contact info added to AVS

## 2023-01-20 NOTE — ASSESSMENT & PLAN NOTE
- Consult for advance care planning and continuity of care with underlying COPD, CHF, recurrent adenocarcinoma of the left lung (was on chemo, currently getting XRT), 3-4L NC home oxygen currently admitted to hospital for worsening diarrhea and dehydration, possibly secondary to her recent chemo. Chart reviewed in depth and discussed in person with primary team; familiar with pt from recent hospital stay for septic shock, which quickly resolved prior to pt being discharged home. See prior ACP note on file for details of last visit.   - Along with Amanda Escobedo (palliative RN), met with pt at bedside (awake, sassy, readily conversational). Reminded her of role of palliative medicine, and discussed how things have been going. Not suprisingly, her main concern has been her worsening diarrhea; she is hopeful to find out more information about the cause, and ideally a way to improve it. Told her this is certainly the goal of her hospital stay.   - Her sons are both still living in [place], though her close friend Mars (she notes that he is only a few years older than her sons, and she essentially raised him) is her main source of support locally. As he does not own his own car and has to borrow this from his significant other, did discuss that she might benefit from more help at home. Based on our discussion, she is open to talking to [facility] about home health.   - Overall, she feels she has a good quality of life, and at this time is agreeable to continuing XRT and follow up with oncology.   - We did discuss that we are hopeful she is able to leave the hospital relatively soon, though we will check in on her while she's here and also during any future hospital stays. She said she would appreciate this.   - Updated Kavon from [facility] and Dr Antonio after visit

## 2023-01-20 NOTE — PROGRESS NOTES
01/20/2023 @ 2:56 PM- RSW attempted to meet with patient for a follow-up visit. RSW unable to complete follow-up visit due to pt sleeping and not responding to RSW attempt to wake pt up. RSW will follow up with patient at a later time.     DENILSON Reyes

## 2023-01-20 NOTE — PLAN OF CARE
"Met with patient & MD at bedside - loose stools have returned - will collect for CDIFF - continue to monitor electrolytes - patient reconsidering hospital bed stating "I do think it'll make things easier" - patient to contact  at her apt to inquire about assistance breaking down current bed - order placed - [facility] reviewing        01/20/23 0903   Discharge Reassessment   Assessment Type Discharge Planning Reassessment   Did the patient's condition or plan change since previous assessment? No   Discharge Plan discussed with: Patient   Communicated FELICIANO with patient/caregiver Date not available/Unable to determine   Discharge Plan A Home   Discharge Plan B Home Health   DME Needed Upon Discharge  hospital bed   Discharge Barriers Identified None   Why the patient remains in the hospital Requires continued medical care   Post-Acute Status   Post-Acute Authorization [facility] Status Pending post-acute provider review/more information requested       "

## 2023-01-20 NOTE — ASSESSMENT & PLAN NOTE
Patient mostly confined to Wheelchair after previously using Rolator at home.  Has chronic pain from OA of Knees, chronic LBP and Left Shoulder pain. Deconditioned after her diarrheal illness as above.  She lives alone.    -Consulted PT - recommends home PT on discharge

## 2023-01-20 NOTE — ASSESSMENT & PLAN NOTE
Elevated Creatinine of 1.4 on admission with a baseline of 1.0.  Likely due to hypovolemia from GI losses.  Appreciate Nephrology recommendations.  S/p IV fluids with improvement of creatinine back to normal - 0.7 today  -Continue IV fluids for now given recurrent diarrhea  - Avoid nephrotoxins, renally dose medication

## 2023-01-20 NOTE — PROGRESS NOTES
"Progress Note  Nephrology    Consult Requested By: Paras Antonio MD  Reason for Consult: multiple electrolyte abnormalities  SUBJECTIVE:     History of Present Illness:  Patient is a 72 y.o. female presents with 7-10 days of worsening diarrhea. Pt usually has loose stools and takes imodium which usually controls symptoms. About 10 days ago this stopped working and her stools became liquid and large volume despite imodium use. She also had not been able to tolerate any diet due to nausea and vomiting for the same duration. She tried drinking Gatorade , but " this made me more nauseous." She then drank some soda with electrolytes, but no much of this. I am seeing her in the ED and she tells me that  she she has a hx of SIADH. She also has a history of chronic hypocalcemia and hypophosphatemia related to Prolia injections that she takes for osteoporosis, and she appears to still be on this medication. She has already received 1.5L NS and calcium replacement with calcium gluconate  and magnesium replacement with MagSO4 in ED. She was also started on empiric Rocephin in D5 carrier solution 50mL. In additional to the above GI issues she does also endorse some increase MEDELLIN/ SOB, over last week. She denies cough, fever chills, CP , Palps. She has had to increase her home O2 from 3L to 4L to help with this issue. CXR today shows small pleural effusions.    She is also on chemotherapy for her lung cancer with last treatment 1/6/2023 Taxol/ Carboplatin    Currently her nausea is improved and she is attempting to eat lunch.    Interval history: Diarrhea has returned. Pending C. Diff.    Past Medical History:   Diagnosis Date    Anxiety     Cervical spinal stenosis     Choledocholithiasis     Chronic obstructive pulmonary disease 03/16/2016    Degenerative disc disease of cervical spine     Diverticulosis 04/24/2018    History of alcohol abuse 03/02/2021    History of gastric ulcer     History of L3 compression fracture " 12/19/2018    History of lung cancer     s/p completion of XRT in 10/2020    Hyperlipidemia     Iron deficiency anemia     Osteoarthritis     Stage III CKD 2017     Past Surgical History:   Procedure Laterality Date    BREAST CYST EXCISION Right     1967    CATARACT EXTRACTION      COLONOSCOPY  2015    COLONOSCOPY N/A 6/8/2022    Procedure: COLONOSCOPY;  Surgeon: Helio Cheema MD;  Location: [facility] ENDO (2ND FLR);  Service: Endoscopy;  Laterality: N/A;  5/25-Pt requesting Dr. Cheema-approval given per Dr. Cheema-ml  Fully vaccinated, prep instr portal -ml    CORONARY ANGIOGRAPHY N/A 7/20/2018    Procedure: ANGIOGRAM, CORONARY ARTERY;  Surgeon: Willard Roberts MD;  Location: [facility] CATH LAB;  Service: Cardiovascular;  Laterality: N/A;    ENDOSCOPIC ULTRASOUND OF UPPER GASTROINTESTINAL TRACT N/A 3/24/2021    Procedure: ULTRASOUND, UPPER GI TRACT, ENDOSCOPIC;  Surgeon: Helio Cheema MD;  Location: [facility] ENDO (2ND FLR);  Service: Endoscopy;  Laterality: N/A;    ENDOSCOPIC ULTRASOUND OF UPPER GASTROINTESTINAL TRACT N/A 4/25/2022    Procedure: ULTRASOUND, UPPER GI TRACT, ENDOSCOPIC;  Surgeon: Helio Cheema MD;  Location: [facility] ENDO (2ND FLR);  Service: Endoscopy;  Laterality: N/A;  cardiac risk assessment 1 per Dr. Ortez. see t/e 3/17-SC  3/25:new instructions via portal. home with medical transport?-SC    ERCP N/A 3/24/2021    Procedure: ERCP (ENDOSCOPIC RETROGRADE CHOLANGIOPANCREATOGRAPHY);  Surgeon: Helio Cheema MD;  Location: [facility] ENDO (2ND FLR);  Service: Endoscopy;  Laterality: N/A;    ERCP N/A 4/30/2021    Procedure: ERCP (ENDOSCOPIC RETROGRADE CHOLANGIOPANCREATOGRAPHY);  Surgeon: Boo Gray MD;  Location: [facility] ENDO (2ND FLR);  Service: Endoscopy;  Laterality: N/A;    ERCP N/A 7/1/2021    Procedure: ERCP (ENDOSCOPIC RETROGRADE CHOLANGIOPANCREATOGRAPHY);  Surgeon: Helio Cheema MD;  Location: [facility] ENDO (2ND FLR);  Service: Endoscopy;  Laterality: N/A;  Ok for Taxi. Dr Cheema  rapid covid  1130am[facility]    ESOPHAGOGASTRODUODENOSCOPY  2015    ESOPHAGOGASTRODUODENOSCOPY N/A 3/4/2021    Procedure: EGD (ESOPHAGOGASTRODUODENOSCOPY);  Surgeon: Yenifer Ramirez MD;  Location: [facility];  Service: Endoscopy;  Laterality: N/A;    ESOPHAGOGASTRODUODENOSCOPY N/A 3/24/2021    Procedure: EGD (ESOPHAGOGASTRODUODENOSCOPY);  Surgeon: Helio Cheema MD;  Location: [facility] ([facility] FLR);  Service: Endoscopy;  Laterality: N/A;    EYE SURGERY  2016    Cataracts    FRACTURE SURGERY  2014    JOINT REPLACEMENT  2007    ORIF FEMUR FRACTURE Left     TOTAL KNEE ARTHROPLASTY Left 2007    TUBAL LIGATION       Family History   Problem Relation Age of Onset    Hypertension Mother     Alzheimer's disease Mother     Dementia Mother     Depression Mother         Alzheimers    Myasthenia gravis Father     Arthritis Father         Myasthenia Gravis    Rectal cancer Father     Hypertension Brother     Arthritis Brother         Colon cancer, obese, high blood pressure    Colon cancer Brother     No Known Problems Son     Cancer Sister         Brain cancer    Miscarriages / Stillbirths Sister     Melanoma Neg Hx     Breast cancer Neg Hx      Social History     Tobacco Use    Smoking status: Some Days     Packs/day: 1.00     Years: 53.00     Pack years: 53.00     Types: Cigarettes     Start date: 4/30/1967    Smokeless tobacco: Never    Tobacco comments:     I had quit for about 6 months this past year. Then massive stress and started back up sometimes   Substance Use Topics    Alcohol use: Yes     Alcohol/week: 7.0 standard drinks     Types: 7 Drinks containing 0.5 oz of alcohol per week     Comment: at least 1 cocktail a day    Drug use: No       Medications Prior to Admission   Medication Sig Dispense Refill Last Dose    albuterol (VENTOLIN HFA) 90 mcg/actuation inhaler inhale 1-2 puffs as needed every 6 hours for wheezing and shortness of breath 25.5 g 11 1/17/2023    ALPRAZolam (XANAX) 0.25 MG tablet Take 1 tablet (0.25 mg  total) by mouth daily as needed for Anxiety. 30 tablet 3 1/18/2023    ascorbic acid, vitamin C, (VITAMIN C) 250 MG tablet Take 250 mg by mouth once daily.   Past Week    atorvastatin (LIPITOR) 40 MG tablet TAKE 1 TABLET BY MOUTH EVERY DAILY 90 tablet 3 1/17/2023    azelastine (ASTELIN) 137 mcg (0.1 %) nasal spray Instill 1 spray (137 mcg total) by Nasal route 2 (two) times daily. 30 mL 3 1/18/2023    b complex vitamins capsule Take 1 capsule by mouth once daily.   Past Week    biotin 10 mg Tab Take 10 mg by mouth once daily.   Past Week    DULoxetine (CYMBALTA) 60 MG capsule Take 1 capsule (60 mg total) by mouth once daily. 90 capsule 2 1/17/2023    fluticasone (FLONASE) 50 mcg/actuation nasal spray 1 spray by Each Nare route 2 (two) times daily as needed for Rhinitis.   Past Week    fluticasone propion-salmeterol 115-21 mcg/dose (ADVAIR HFA) 115-21 mcg/actuation HFAA inhaler Inhale 2 puffs into the lungs every 12 (twelve) hours. 36 g 3 1/17/2023    gabapentin (NEURONTIN) 300 MG capsule Take 1 capsule (300 mg total) by mouth 3 (three) times daily. 90 capsule 11 1/17/2023    guaiFENesin (MUCINEX) 600 mg 12 hr tablet Take 1,200 mg by mouth 2 (two) times daily as needed for Congestion.   1/18/2023    HYDROcodone-acetaminophen (NORCO)  mg per tablet Take 1 tablet by mouth every 12 (twelve) hours as needed for Pain. 60 tablet 0 1/18/2023    losartan (COZAAR) 100 MG tablet Take 1 tablet (100 mg total) by mouth once daily. 90 tablet 3 1/17/2023    lutein 40 mg Cap Take by mouth.   Past Week    magnesium oxide (MAG-OX) 400 mg (241.3 mg magnesium) tablet Take 1 tablet by mouth every 12 (twelve) hours. 30 tablet 0 Past Week    metoprolol succinate (TOPROL-XL) 25 MG 24 hr tablet Take 2 tablets (50 mg total) by mouth once daily. 60 tablet 11 1/17/2023    multivit-min/iron/folic/lutein (CENTRUM SILVER WOMEN ORAL) Take 1 tablet by mouth once daily.   Past Week    pantoprazole (PROTONIX) 40 MG tablet Take 1 tablet (40 mg  total) by mouth once daily. 90 tablet 3 1/17/2023    torsemide (DEMADEX) 20 MG Tab Take 1 tablet (20 mg total) by mouth once daily. 90 tablet 3 1/17/2023    traZODone (DESYREL) 100 MG tablet Take 1 tablet (100 mg total) by mouth every evening. 30 tablet 2 1/17/2023    umeclidinium (INCRUSE ELLIPTA) 62.5 mcg/actuation inhalation capsule Inhale 1 puff into the lungs once daily. Controller 90 each 3 1/17/2023    vitamin D (VITAMIN D3) 1000 units Tab Take 1 tablet (1,000 Units total) by mouth once daily. 30 tablet 2 Past Week    ZINC ORAL Take by mouth.   Past Week    calcium carbonate (OS-SANDRINE) 500 mg calcium (1,250 mg) tablet Take 2 tablets (1,000 mg total) by mouth 2 (two) times daily. 30 tablet 0     cyanocobalamin, vitamin B-12, 1,000 mcg TbSR Take 1,000 mcg by mouth once daily.       denosumab (PROLIA) 60 mg/mL Syrg Inject 1 mL (60 mg total) into the skin every 6 (six) months. 2 mL 0 More than a month    ondansetron (ZOFRAN-ODT) 8 MG TbDL dissolve 1 tablet (8 mg total) by mouth every 8 (eight) hours as needed (nausea). 60 tablet 2     promethazine (PHENERGAN) 25 MG tablet Take 1 tablet (25 mg total) by mouth every 6 (six) hours as needed for Nausea. 60 tablet 3          Review of patient's allergies indicates:   Allergen Reactions    Wellbutrin [bupropion hcl] Other (See Comments)     Hyponatremia and hypomagnesia     Zoloft [sertraline] Other (See Comments)     Hyponatremia and hypomagnesia     Bananas [banana]      Emesis, and stomach cramps    Patient states she is not allergic to Banana        Review of Systems:  Constitutional: No fever or chills, no weight changes.  Eyes: No visual changes or photophobia  HEENT: No nasal congestion or sore throat  Respiratory: No cough or shorness of breath  Cardiovascular: No chest pain or palpitations  Gastrointestinal: Good appetite, no nausea or vomiting, no change in bowel habits  Genitourinary: No hematuria or dysuria  Skin: No rash or pruritis  Hematologic/lymphatic:  No easy bruising, bleeding or lymphadenopathy  Musculoskeletal: No arthralgias or myalgias  Neurological: No seizures or tremors  Endocrine: No heat/cold intolerance.  No polyuria/polydipsia.  Psychiatric:  No depression or anxiety.     OBJECTIVE:     Vital Signs (Most Recent)  Temp: 97.4 °F (36.3 °C) (01/20/23 0748)  Pulse: 87 (01/20/23 0748)  Resp: 20 (01/20/23 0748)  BP: 135/83 (01/20/23 0748)  SpO2: 99 % (01/20/23 0748)    Vital Signs Range (Last 24H):  Temp:  [97.4 °F (36.3 °C)-98.3 °F (36.8 °C)]   Pulse:  [74-87]   Resp:  [14-20]   BP: (103-135)/(56-83)   SpO2:  [96 %-100 %]     Physical Exam:    General appearance: Well developed, well nourished  HEENT: NC in place  Lungs: Clear to auscultation bilaterally and normal respiratory effort  Heart: Regular rate and rhythm, S1, S2 normal, no murmur, click, rub or gallop  Abdomen: Soft, non-tender non-distended; bowel sounds normal  Extremities: No cyanosis or clubbing. Trace bilateral LE edema  Pulses: 2+ and symmetric  Psychiatric:  Alert and oriented times 3.  Affect appropriate.     Diagnostic Results:  Labs: Reviewed  X-Ray: Reviewed    ASSESSMENT/PLAN:   Acute kidney injury/ acidosis  -Baseline cr 0.9 -1.1  -admit Cr 1.4> 1.5 this am  -suspect due to volume status, UTI and profuse diarrhea.  -Urine culture sent and on rocephin  -Given IVFs with NS  -recheck chem, urine lytes, Eosin, uric acid and cpk.  -Repeat CT abd/ pelvis w/o to R/O obstruction or intrinsic structural abnormality given that abnormalities on last CT.  -Acidosis now non-AG and likely due to diarrhea  -Will need to take caution in correcting acidosis due to Na.  -1/19: agree with continuing IVF's for now. Cr improving.  1/20: Cr at baseline. Continue IVF's given diarrhea    Acute on Chronic Hyponatremia   -Underlying SIADH by history with chronic Na level in low 130s  -On Torsemide at home  -She appears to be clinically volume expanded although this could be due to her poor nutriotnal state  and underlying cancer.  -BNP is not markedly elevated but can be inaccurate in obesity.  -Given 1.5L NS in ED  -Check urine lytes, urine Osm, (although these may not be accurate since she has already received IVFs).  -In addition her chemo can cause hyponatremia as well as her volume status.  -so far Na improving to her baseline and actually better than prior    Hypokalemia  -Repleted by primary    Acute on chronic Hypocalcemia/ Hypophosphatemia  -Given calcium glucanate  2g in ED  -Is on Prolia but will see when her last dose was.  -Also can be a side effect of her chemo and diarrhea  -replace prn and continue oral Calcium and Vit D as well.    Hypomagnesemia  -Given Magnesium SO4 4 g  -Seem to require multiple chronic infusion replacements.  -Also can be a side effect of her chemo and diarrhea  -On PPI as well.  -replace prn    Malnutrition  -Alb 1.8-2.1    Hyperuricema  -high uric acid could be from volume status, diuretics, tumor burden.  -repeat after volume.  -May need Allopurinol.    Bilateral Pleural Effusions/COPD/Right lung adenocarcinoma with recurrence   -per Pulmonary with bedside US no effusion on R, very small effusion on L.  -on home O2 2LNC titrate to O2 sat 88-02% on triple therapy inh  -on chemo with Taxol/ Carboplatin last infusion 1/6/23.  -Considering stopping.    Thanks for consultation, will follow along with you.   High complexity MDM because: JONATHAN acidosis, SIADH, multiple electrolyte abnormalities, Lung cancer diarrhea, N/V  -Chronic illness with SEVERE exacerbation, progression, or side effects of treatment.  -Acute or chronic illness or injury that poses a threat to life or bodily function  -Need for emergent dialysis  -Drug therapy requiring intensive monitoring for toxicity  -Chronic illness with MILD exacerbation, progression, or side effects of treatment  -2 or more stable chronic illnesss  -Acute illness with systemic symptoms  -Prescription drug management  -Discussion with family

## 2023-01-20 NOTE — ASSESSMENT & PLAN NOTE
Patient reports non-bloody diarrhea for 6 days, associated with loss of appetite, nausea, non-bloody vomiting, lethargy, chills.  Her last chemotherapy was on 1/6/2023.  Creatinine was 1.4 on admission with significant electrolyte abnormalities, including Calcium, Magnesium, Phos.  Uric Acid was 13.2. Patient with known Hyponatremia due to SIADH and Na near her baseline. No significant diarrhea while in the ED or since admission.  Recent stool studies on 12/25/2023 negative.  Given 1.5L NS in ED and electrolytes replaced.  Diarrhea had resolved for over 24 hours, but recurrent this morning x 2 episode (watery and cramping).  C Diff canceled given resolution of diarrhea, but now will need to reorder.  Appreciate Nephrology recommendations.    Plan:  Check Stool for C Diff and Cultures  Check Stool WBC - if negative, can start with imodium  Continue with IV fluids given recurrent diarrhea  Replace electrolytes as needed

## 2023-01-20 NOTE — CONSULTS
[facility] ([place])  Palliative Medicine  Consult Note    Patient Name: Nalini Varma  MRN: [id number]  Admission Date: 1/17/2023  Hospital Length of Stay: 3 days  Code Status: Full Code   Attending Provider: Paras Antonio MD  Consulting Provider: Beata Parks MD  Primary Care Physician: Yane Sahni MD  Principal Problem:Hypovolemia dehydration    Patient information was obtained from patient, past medical records, ER records and primary team.      Inpatient consult to Palliative Care  Consult performed by: Betaa aPrks MD  Consult ordered by: Paras Antonio MD  Reason for consult: Advance Care Planning         Assessment/Plan:     Acute Diarrhea with Volume Depletion and Electrolyte Abnormalities  - Evaluation and management per primary team     Advance Care Planning        1/20/23  - Consult for advance care planning and continuity of care with underlying COPD, CHF, recurrent adenocarcinoma of the left lung (was on chemo, currently getting XRT), 3-4L NC home oxygen currently admitted to hospital for worsening diarrhea and dehydration, possibly secondary to her recent chemo. Chart reviewed in depth and discussed in person with primary team; familiar with pt from recent hospital stay for septic shock, which quickly resolved prior to pt being discharged home. See prior ACP note on file for details of last visit.   - Along with Amanda Escobedo (palliative RN), met with pt at bedside (awake, sassy, readily conversational). Reminded her of role of palliative medicine, and discussed how things have been going. Not suprisingly, her main concern has been her worsening diarrhea; she is hopeful to find out more information about the cause, and ideally a way to improve it. Told her this is certainly the goal of her hospital stay.   - Her sons are both still living in [place], though her close friend Mars (she notes that he is only a few years older than her sons, and she essentially raised  him) is her main source of support locally. As he does not own his own car and has to borrow this from his significant other, did discuss that she might benefit from more help at home. Based on our discussion, she is open to talking to [facility] about home health.   - Overall, she feels she has a good quality of life, and at this time is agreeable to continuing XRT and follow up with oncology.   - We did discuss that we are hopeful she is able to leave the hospital relatively soon, though we will check in on her while she's here and also during any future hospital stays. She said she would appreciate this.   - Updated Kavon from [facility] and Dr Antonio after visit    Acute renal failure superimposed on stage 3 chronic kidney disease  - Improved with IV fluids     Syndrome of inappropriate secretion of antidiuretic hormone  - Nephrology consulted and managing hyponatremia along with primary    Acute on chronic respiratory failure with hypoxia and hypercapnia  - At baseline is on home oxygen 3L, though notes needing 4L prior to admission. Likely multifactorial (underlying CHF, COPD, atelectasis, deconditioning, lung cancer, pleural effusion). Pleural effusion evaluated by pulmonology; no indication for thoracentesis at this time.   - Evaluation and management per primary team     Debility  - PT/OT consult; would benefit from home health, at a minimum     Urinary tract infection without hematuria  - Abx per primary team     Chronic diastolic heart failure  - Noted underlying disease; cardiac medications per primary team     Chronic obstructive pulmonary disease  - Noted underlying disease; home medications and oxygen per primary team     Thank you for your consult. I will follow-up with patient. Please contact us if you have any additional questions.     GASTON Burris  Palliative Medicine Staff   [phone]    > 50% of 70 min visit spent in chart review, face to face discussion of symptom assessment, coordination of  "care with other specialists, goals of care, emotional support, formulating and communicating prognosis, exploring burden/ benefit of various approaches of treatment.     Subjective:     HPI: (From H&P) "Nalini Varma is a 72 year old female with PMHx of COPD (on home O2 3L) with 53 pack year smoking hx, current smoker, lung CA (currently on radiation and chemo) chronic anemia, anxiety, CKD Stage 3, systolic and diastolic heart failure (EF 55%, Grade II diastolic dysfunction, 12/24/22) presenting to ER for hypotension and increased shortness of breath for 6 days. Patient reports non-bloody diarrhea for 6 days, associated with loss of appetite, nausea, non-bloody vomiting, lethargy, chills. Patient also reports increased SOB but denies purulent sputum, sore throat or fever. Patient denies orthopnea or bilateral lower extremity swelling. Patient also reports dysuria and suprapubic tenderness.      H/H 8.4/25.4 elevated from baseline 7.7/23.3, likely due to hypovolemia. No leukocytosis but elevated lactate 2.3. UA with nitrites and +3 leukocytes. Hyponatremia 127 (baseline 133), hypocalcemia 8.2 (corrected for hypoalbuminemia 1.2). Cr elevated 1.4 from baseline 1.0, and an elevated AG 18. CXR with findings of  edema noting left pleural effusion. BNP elevated at 139. Patient was given IV calcium gluconate 1g, IV Mag sulfate 2g and IV NaCl 0.9% 1.5L in ER. Nephrology and Pulmonology was consulted. Patient was started on IV Ceftriaxone 1g for symptomatic UTI.     Patient is admitted to inpatient service with Hospital Medicine for management of electrolyte abnormalities, left pleural effusion and symptomatic UTI."    Palliative medicine is consulted for ACP and continuity of care; familiar with pt from prior hospital stay. Met with pt at bedside; for details of visit, see ACP section of plan.      Past Medical History:   Diagnosis Date    Anxiety     Cervical spinal stenosis     Choledocholithiasis     Chronic " obstructive pulmonary disease 03/16/2016    Degenerative disc disease of cervical spine     Diverticulosis 04/24/2018    History of alcohol abuse 03/02/2021    History of gastric ulcer     History of L3 compression fracture 12/19/2018    History of lung cancer     s/p completion of XRT in 10/2020    Hyperlipidemia     Iron deficiency anemia     Osteoarthritis     Stage III CKD 2017       Past Surgical History:   Procedure Laterality Date    BREAST CYST EXCISION Right     1967    CATARACT EXTRACTION      COLONOSCOPY  2015    COLONOSCOPY N/A 6/8/2022    Procedure: COLONOSCOPY;  Surgeon: Helio Cheema MD;  [facility]  Service: Endoscopy;  Laterality: N/A;  5/25-Pt requesting Dr. Cheema-approval given per Dr. Cheema-ml  Fully vaccinated, prep instr portal -ml    CORONARY ANGIOGRAPHY N/A 7/20/2018    Procedure: ANGIOGRAM, CORONARY ARTERY;  Surgeon: Willard Roberts MD;  Location: [facility] CATH LAB;  Service: Cardiovascular;  Laterality: N/A;    ENDOSCOPIC ULTRASOUND OF UPPER GASTROINTESTINAL TRACT N/A 3/24/2021    Procedure: ULTRASOUND, UPPER GI TRACT, ENDOSCOPIC;  Surgeon: Helio Cheema MD;  [facility]  Service: Endoscopy;  Laterality: N/A;    ENDOSCOPIC ULTRASOUND OF UPPER GASTROINTESTINAL TRACT N/A 4/25/2022    Procedure: ULTRASOUND, UPPER GI TRACT, ENDOSCOPIC;  Surgeon: Helio Cheema MD;  Location: [facility]  Service: Endoscopy;  Laterality: N/A;  cardiac risk assessment 1 per Dr. Ortez. see t/e 3/17-SC  3/25:new instructions via portal. home with medical transport?-SC    ERCP N/A 3/24/2021    Procedure: ERCP (ENDOSCOPIC RETROGRADE CHOLANGIOPANCREATOGRAPHY);  Surgeon: Helio Cheema MD;  Location: [facility] (2ND FLR);  Service: Endoscopy;  Laterality: N/A;    ERCP N/A 4/30/2021    Procedure: ERCP (ENDOSCOPIC RETROGRADE CHOLANGIOPANCREATOGRAPHY);  Surgeon: Boo Gray MD;  [facility]O [facility]  Service: Endoscopy;   Laterality: N/A;    ERCP N/A 7/1/2021    Procedure: ERCP (ENDOSCOPIC RETROGRADE CHOLANGIOPANCREATOGRAPHY);  Surgeon: Helio Cheema MD;  Location: [facility] (2ND FLR);  Service: Endoscopy;  Laterality: N/A;  Ok for Taxi. Dr Cheema  rapid covid 1130am- tb inst email    ESOPHAGOGASTRODUODENOSCOPY  2015    ESOPHAGOGASTRODUODENOSCOPY N/A 3/4/2021    Procedure: EGD (ESOPHAGOGASTRODUODENOSCOPY);  Surgeon: Yenifer Ramirez MD;  Location: [facility] ENDO;  Service: Endoscopy;  Laterality: N/A;    ESOPHAGOGASTRODUODENOSCOPY N/A 3/24/2021    Procedure: EGD (ESOPHAGOGASTRODUODENOSCOPY);  Surgeon: Helio Cheema MD;  Location: [facility] (2ND FLR);  Service: Endoscopy;  Laterality: N/A;    EYE SURGERY  2016    Cataracts    FRACTURE SURGERY  2014    JOINT REPLACEMENT  2007    ORIF FEMUR FRACTURE Left     TOTAL KNEE ARTHROPLASTY Left 2007    TUBAL LIGATION         Review of patient's allergies indicates:   Allergen Reactions    Wellbutrin [bupropion hcl] Other (See Comments)     Hyponatremia and hypomagnesia     Zoloft [sertraline] Other (See Comments)     Hyponatremia and hypomagnesia     Bananas [banana]      Emesis, and stomach cramps    Patient states she is not allergic to Banana       Medications:  Continuous Infusions:   sodium chloride 0.9% Stopped (01/18/23 1552)     Scheduled Meds:   albuterol-ipratropium  3 mL Nebulization Q4H WAKE    ascorbic acid (vitamin C)  250 mg Oral Daily    atorvastatin  40 mg Oral Daily    azelastine  1 spray Nasal BID    cefTRIAXone (ROCEPHIN) IVPB  1 g Intravenous Q24H    collagenase   Topical (Top) Daily    DULoxetine  60 mg Oral Daily    enoxaparin  40 mg Subcutaneous Daily    gabapentin  300 mg Oral TID    magnesium oxide  400 mg Oral BID    metoprolol succinate  50 mg Oral Daily    pantoprazole  40 mg Oral Daily    potassium chloride  20 mEq Oral TID    sodium bicarbonate  650 mg Oral BID    traZODone  100 mg Oral QHS    vitamin D  1,000 Units Oral Daily      PRN Meds:acetaminophen, ALPRAZolam, aluminum-magnesium hydroxide-simethicone, guaiFENesin, HYDROcodone-acetaminophen, ondansetron, prochlorperazine, simethicone, sodium chloride 0.9%    Family History       Problem Relation (Age of Onset)    Alzheimer's disease Mother    Arthritis Father, Brother    Cancer Sister    Colon cancer Brother    Dementia Mother    Depression Mother    Hypertension Mother, Brother    Miscarriages / Stillbirths Sister    Myasthenia gravis Father    No Known Problems Son    Rectal cancer Father          Tobacco Use    Smoking status: Some Days     Packs/day: 1.00     Years: 53.00     Pack years: 53.00     Types: Cigarettes     Start date: 4/30/1967    Smokeless tobacco: Never    Tobacco comments:     I had quit for about 6 months this past year. Then massive stress and started back up sometimes   Substance and Sexual Activity    Alcohol use: Yes     Alcohol/week: 7.0 standard drinks     Types: 7 Drinks containing 0.5 oz of alcohol per week     Comment: at least 1 cocktail a day    Drug use: No    Sexual activity: Not Currently     Partners: Male     Birth control/protection: Abstinence, Post-menopausal       Review of Systems   Constitutional:  Positive for fatigue.   HENT:  Negative for congestion.    Respiratory:  Positive for shortness of breath.    Gastrointestinal:  Positive for diarrhea. Negative for blood in stool.   Genitourinary:  Negative for difficulty urinating.   Musculoskeletal:  Negative for back pain.   Neurological:  Negative for seizures.   Psychiatric/Behavioral:  Negative for confusion.    Objective:     Vital Signs (Most Recent):  Temp: 97.9 °F (36.6 °C) (01/20/23 1201)  Pulse: 86 (01/20/23 1201)  Resp: 16 (01/20/23 1201)  BP: (!) 147/74 (01/20/23 1201)  SpO2: 97 % (01/20/23 1201)   Vital Signs (24h Range):  Temp:  [97.4 °F (36.3 °C)-98.3 °F (36.8 °C)] 97.9 °F (36.6 °C)  Pulse:  [67-87] 86  Resp:  [16-20] 16  SpO2:  [96 %-100 %] 97 %  BP: (103-147)/(56-83)  147/74     Weight: 96.2 kg (212 lb 1.3 oz)  Body mass index is 34.23 kg/m².    Physical Exam  Constitutional:       Comments: Chronically ill-appearing, sitting up in bed, in no distress, pleasant and conversational    HENT:      Head:      Comments: Trace temporal wasting      Nose: Nose normal.      Mouth/Throat:      Mouth: Mucous membranes are moist.   Eyes:      Extraocular Movements: Extraocular movements intact.   Cardiovascular:      Rate and Rhythm: Normal rate.   Pulmonary:      Comments: No conversational dyspnea, on NC oxygen   Skin:     General: Skin is warm.   Neurological:      General: No focal deficit present.      Mental Status: She is alert and oriented to person, place, and time.   Psychiatric:         Mood and Affect: Mood normal.         Behavior: Behavior normal.     Significant Labs: All pertinent labs within the past 24 hours have been reviewed.  CBC:   Recent Labs   Lab 01/20/23  0635   WBC 4.71   HGB 7.6*   HCT 23.4*   MCV 99*        BMP:  Recent Labs   Lab 01/20/23  0635   GLU 95   *   K 4.8      CO2 23   BUN 19   CREATININE 0.7   CALCIUM 7.5*   MG 1.4*     LFT:  Lab Results   Component Value Date    AST 19 01/19/2023    ALKPHOS 76 01/19/2023    BILITOT 0.3 01/19/2023     Albumin:   Albumin   Date Value Ref Range Status   01/20/2023 1.9 (L) 3.5 - 5.2 g/dL Final     Protein:   Total Protein   Date Value Ref Range Status   01/19/2023 4.8 (L) 6.0 - 8.4 g/dL Final     Lactic acid:   Lab Results   Component Value Date    LACTATE 0.7 01/17/2023    LACTATE 2.3 (H) 01/17/2023       Significant Imaging: I have reviewed all pertinent imaging results/findings within the past 24 hours.

## 2023-01-20 NOTE — ASSESSMENT & PLAN NOTE
- At baseline is on home oxygen 3L, though notes needing 4L prior to admission. Likely multifactorial (underlying CHF, COPD, atelectasis, deconditioning, lung cancer, pleural effusion). Pleural effusion evaluated by pulmonology; no indication for thoracentesis at this time.   - Evaluation and management per primary team

## 2023-01-20 NOTE — ASSESSMENT & PLAN NOTE
Hx of anxiety, MDD - Chronic. Controlled.  -Continue with Alprazolam 0.25mg PRN  -Continue home meds of Duloxetine 60mg daily, Trazodone 100mg qhs PRN

## 2023-01-21 LAB
ALBUMIN SERPL BCP-MCNC: 2 G/DL (ref 3.5–5.2)
ANION GAP SERPL CALC-SCNC: 3 MMOL/L (ref 8–16)
BASOPHILS # BLD AUTO: 0.01 K/UL (ref 0–0.2)
BASOPHILS NFR BLD: 0.2 % (ref 0–1.9)
BUN SERPL-MCNC: 16 MG/DL (ref 8–23)
CALCIUM SERPL-MCNC: 8.1 MG/DL (ref 8.7–10.5)
CHLORIDE SERPL-SCNC: 110 MMOL/L (ref 95–110)
CO2 SERPL-SCNC: 23 MMOL/L (ref 23–29)
CREAT SERPL-MCNC: 0.7 MG/DL (ref 0.5–1.4)
DIFFERENTIAL METHOD: ABNORMAL
EOSINOPHIL # BLD AUTO: 0 K/UL (ref 0–0.5)
EOSINOPHIL NFR BLD: 0.3 % (ref 0–8)
ERYTHROCYTE [DISTWIDTH] IN BLOOD BY AUTOMATED COUNT: 18 % (ref 11.5–14.5)
EST. GFR  (NO RACE VARIABLE): >60 ML/MIN/1.73 M^2
GLUCOSE SERPL-MCNC: 92 MG/DL (ref 70–110)
HCT VFR BLD AUTO: 26.1 % (ref 37–48.5)
HGB BLD-MCNC: 8.4 G/DL (ref 12–16)
IMM GRANULOCYTES # BLD AUTO: 0.05 K/UL (ref 0–0.04)
IMM GRANULOCYTES NFR BLD AUTO: 0.8 % (ref 0–0.5)
LYMPHOCYTES # BLD AUTO: 1 K/UL (ref 1–4.8)
LYMPHOCYTES NFR BLD: 16.9 % (ref 18–48)
MAGNESIUM SERPL-MCNC: 1.2 MG/DL (ref 1.6–2.6)
MCH RBC QN AUTO: 32.4 PG (ref 27–31)
MCHC RBC AUTO-ENTMCNC: 32.2 G/DL (ref 32–36)
MCV RBC AUTO: 101 FL (ref 82–98)
MONOCYTES # BLD AUTO: 0.6 K/UL (ref 0.3–1)
MONOCYTES NFR BLD: 10.1 % (ref 4–15)
NEUTROPHILS # BLD AUTO: 4.4 K/UL (ref 1.8–7.7)
NEUTROPHILS NFR BLD: 71.7 % (ref 38–73)
NRBC BLD-RTO: 0 /100 WBC
PHOSPHATE SERPL-MCNC: 2.6 MG/DL (ref 2.7–4.5)
PLATELET # BLD AUTO: 201 K/UL (ref 150–450)
PMV BLD AUTO: 9.9 FL (ref 9.2–12.9)
POTASSIUM SERPL-SCNC: 5.6 MMOL/L (ref 3.5–5.1)
RBC # BLD AUTO: 2.59 M/UL (ref 4–5.4)
SODIUM SERPL-SCNC: 136 MMOL/L (ref 136–145)
WBC # BLD AUTO: 6.16 K/UL (ref 3.9–12.7)

## 2023-01-21 PROCEDURE — 25000003 PHARM REV CODE 250

## 2023-01-21 PROCEDURE — 99233 PR SUBSEQUENT HOSPITAL CARE,LEVL III: ICD-10-PCS | Mod: ,,, | Performed by: INTERNAL MEDICINE

## 2023-01-21 PROCEDURE — 25000003 PHARM REV CODE 250: Performed by: INTERNAL MEDICINE

## 2023-01-21 PROCEDURE — 27000207 HC ISOLATION

## 2023-01-21 PROCEDURE — 27000221 HC OXYGEN, UP TO 24 HOURS

## 2023-01-21 PROCEDURE — 21400001 HC TELEMETRY ROOM

## 2023-01-21 PROCEDURE — 99233 SBSQ HOSP IP/OBS HIGH 50: CPT | Mod: ,,, | Performed by: INTERNAL MEDICINE

## 2023-01-21 PROCEDURE — 63600175 PHARM REV CODE 636 W HCPCS: Performed by: INTERNAL MEDICINE

## 2023-01-21 PROCEDURE — 80069 RENAL FUNCTION PANEL: CPT | Performed by: INTERNAL MEDICINE

## 2023-01-21 PROCEDURE — 63600175 PHARM REV CODE 636 W HCPCS

## 2023-01-21 PROCEDURE — 99900035 HC TECH TIME PER 15 MIN (STAT)

## 2023-01-21 PROCEDURE — 94640 AIRWAY INHALATION TREATMENT: CPT

## 2023-01-21 PROCEDURE — 94761 N-INVAS EAR/PLS OXIMETRY MLT: CPT

## 2023-01-21 PROCEDURE — 83735 ASSAY OF MAGNESIUM: CPT | Performed by: INTERNAL MEDICINE

## 2023-01-21 PROCEDURE — 85025 COMPLETE CBC W/AUTO DIFF WBC: CPT | Performed by: INTERNAL MEDICINE

## 2023-01-21 PROCEDURE — 36415 COLL VENOUS BLD VENIPUNCTURE: CPT | Performed by: INTERNAL MEDICINE

## 2023-01-21 PROCEDURE — 25000242 PHARM REV CODE 250 ALT 637 W/ HCPCS

## 2023-01-21 RX ORDER — FLUTICASONE PROPIONATE 50 MCG
1 SPRAY, SUSPENSION (ML) NASAL DAILY
Status: DISCONTINUED | OUTPATIENT
Start: 2023-01-22 | End: 2023-01-24 | Stop reason: HOSPADM

## 2023-01-21 RX ORDER — MAGNESIUM SULFATE HEPTAHYDRATE 40 MG/ML
2 INJECTION, SOLUTION INTRAVENOUS ONCE
Status: COMPLETED | OUTPATIENT
Start: 2023-01-21 | End: 2023-01-21

## 2023-01-21 RX ORDER — FLUTICASONE FUROATE AND VILANTEROL 100; 25 UG/1; UG/1
1 POWDER RESPIRATORY (INHALATION) DAILY
Status: DISCONTINUED | OUTPATIENT
Start: 2023-01-21 | End: 2023-01-24 | Stop reason: HOSPADM

## 2023-01-21 RX ORDER — GUAIFENESIN 600 MG/1
1200 TABLET, EXTENDED RELEASE ORAL 2 TIMES DAILY
Status: DISCONTINUED | OUTPATIENT
Start: 2023-01-21 | End: 2023-01-24 | Stop reason: HOSPADM

## 2023-01-21 RX ADMIN — METOPROLOL SUCCINATE 50 MG: 50 TABLET, EXTENDED RELEASE ORAL at 08:01

## 2023-01-21 RX ADMIN — MAGNESIUM SULFATE HEPTAHYDRATE 2 G: 40 INJECTION, SOLUTION INTRAVENOUS at 01:01

## 2023-01-21 RX ADMIN — GABAPENTIN 300 MG: 300 CAPSULE ORAL at 04:01

## 2023-01-21 RX ADMIN — COLLAGENASE SANTYL: 250 OINTMENT TOPICAL at 08:01

## 2023-01-21 RX ADMIN — IPRATROPIUM BROMIDE AND ALBUTEROL SULFATE 3 ML: 2.5; .5 SOLUTION RESPIRATORY (INHALATION) at 05:01

## 2023-01-21 RX ADMIN — IPRATROPIUM BROMIDE AND ALBUTEROL SULFATE 3 ML: 2.5; .5 SOLUTION RESPIRATORY (INHALATION) at 08:01

## 2023-01-21 RX ADMIN — POTASSIUM CHLORIDE 20 MEQ: 1500 TABLET, EXTENDED RELEASE ORAL at 08:01

## 2023-01-21 RX ADMIN — GABAPENTIN 300 MG: 300 CAPSULE ORAL at 08:01

## 2023-01-21 RX ADMIN — TRAZODONE HYDROCHLORIDE 100 MG: 100 TABLET ORAL at 10:01

## 2023-01-21 RX ADMIN — ATORVASTATIN CALCIUM 40 MG: 20 TABLET, FILM COATED ORAL at 08:01

## 2023-01-21 RX ADMIN — GUAIFENESIN 1200 MG: 600 TABLET, EXTENDED RELEASE ORAL at 08:01

## 2023-01-21 RX ADMIN — VANCOMYCIN HYDROCHLORIDE 125 MG: KIT at 01:01

## 2023-01-21 RX ADMIN — IPRATROPIUM BROMIDE AND ALBUTEROL SULFATE 3 ML: 2.5; .5 SOLUTION RESPIRATORY (INHALATION) at 01:01

## 2023-01-21 RX ADMIN — GABAPENTIN 300 MG: 300 CAPSULE ORAL at 10:01

## 2023-01-21 RX ADMIN — IPRATROPIUM BROMIDE AND ALBUTEROL SULFATE 3 ML: 2.5; .5 SOLUTION RESPIRATORY (INHALATION) at 07:01

## 2023-01-21 RX ADMIN — SODIUM BICARBONATE 650 MG TABLET 650 MG: at 08:01

## 2023-01-21 RX ADMIN — Medication 1000 UNITS: at 08:01

## 2023-01-21 RX ADMIN — HYDROCODONE BITARTRATE AND ACETAMINOPHEN 1 TABLET: 10; 325 TABLET ORAL at 05:01

## 2023-01-21 RX ADMIN — ENOXAPARIN SODIUM 40 MG: 40 INJECTION SUBCUTANEOUS at 04:01

## 2023-01-21 RX ADMIN — SIMETHICONE 80 MG: 80 TABLET, CHEWABLE ORAL at 05:01

## 2023-01-21 RX ADMIN — VANCOMYCIN HYDROCHLORIDE 125 MG: KIT at 07:01

## 2023-01-21 RX ADMIN — AZELASTINE HYDROCHLORIDE 137 MCG: 137 SPRAY, METERED NASAL at 08:01

## 2023-01-21 RX ADMIN — CEFTRIAXONE 1 G: 1 INJECTION, SOLUTION INTRAVENOUS at 05:01

## 2023-01-21 RX ADMIN — ALPRAZOLAM 0.25 MG: 0.25 TABLET ORAL at 11:01

## 2023-01-21 RX ADMIN — Medication 400 MG: at 08:01

## 2023-01-21 RX ADMIN — Medication 400 MG: at 10:01

## 2023-01-21 RX ADMIN — PANTOPRAZOLE SODIUM 40 MG: 40 TABLET, DELAYED RELEASE ORAL at 08:01

## 2023-01-21 RX ADMIN — DIBASIC SODIUM PHOSPHATE, MONOBASIC POTASSIUM PHOSPHATE AND MONOBASIC SODIUM PHOSPHATE 1 TABLET: 852; 155; 130 TABLET ORAL at 01:01

## 2023-01-21 RX ADMIN — GUAIFENESIN 1200 MG: 600 TABLET, EXTENDED RELEASE ORAL at 10:01

## 2023-01-21 RX ADMIN — VANCOMYCIN HYDROCHLORIDE 125 MG: KIT at 05:01

## 2023-01-21 RX ADMIN — DULOXETINE 60 MG: 30 CAPSULE, DELAYED RELEASE ORAL at 08:01

## 2023-01-21 RX ADMIN — AZELASTINE HYDROCHLORIDE 137 MCG: 137 SPRAY, METERED NASAL at 10:01

## 2023-01-21 RX ADMIN — Medication 250 MG: at 08:01

## 2023-01-21 NOTE — ASSESSMENT & PLAN NOTE
"Patient presented with increased SOB from her baseline.  She still smokes 1ppd and on home O2 at 3L NC.  Likely from COPD exacerbation. CXR on admission with findings suggest edema noting left pleural effusion.  Increased parenchymal attenuation projects over the right upper lung zone, developing consolidation is not excluded noting the appearance is similar to previous exams.  Labs on admission with Lactic Acid elevated at 2.3 and normalized.  .  Improved and stable.    Patient seen by Pulmonary and "The patient's working assessments include diarrhea, COPD, small L sided pleural effusion by POCUS.  Do not suspect the chronic effusion is operant in her current admit and do not advise tap.  Continue home COPD regimen".  Lung exam with much improved wheezing.    Plan:  Continue with O2 at tolerated to keep SpO2 >92%   Continue with Duonebs Q4 wake  Encourage smoking cessation  Strict intake and output    "

## 2023-01-21 NOTE — PLAN OF CARE
POC reviewed with patient. AAOx4. Purposeful rounding completed. Cardiac monitor maintained.  IV fluid in progress. No injuries, falls, or trauma occurred during shift. Complaints of pain treated with PRN pain medication according to MAR. No other complaints verbalized during shift. Bed low and locked, with SR up x 2. Call light placed within easy reach. Safety maintained. Patient in no apparent distress    Problem: Adult Inpatient Plan of Care  Goal: Plan of Care Review  Outcome: Ongoing, Progressing  Goal: Patient-Specific Goal (Individualized)  Outcome: Ongoing, Progressing  Goal: Absence of Hospital-Acquired Illness or Injury  Outcome: Ongoing, Progressing  Goal: Optimal Comfort and Wellbeing  Outcome: Ongoing, Progressing  Goal: Readiness for Transition of Care  Outcome: Ongoing, Progressing     Problem: Fall Injury Risk  Goal: Absence of Fall and Fall-Related Injury  Outcome: Ongoing, Progressing

## 2023-01-21 NOTE — PLAN OF CARE
POC reviewed with pt and she verbalized understanding. Denies pain/discomfort. VSS on 2L NC. PRN meds given per pt request. Shifts weight in bed independently. Safety precautions in place. Will continue with plan of care.     Problem: Adult Inpatient Plan of Care  Goal: Plan of Care Review  Outcome: Ongoing, Progressing  Goal: Patient-Specific Goal (Individualized)  Outcome: Ongoing, Progressing  Goal: Absence of Hospital-Acquired Illness or Injury  Outcome: Ongoing, Progressing  Goal: Optimal Comfort and Wellbeing  Outcome: Ongoing, Progressing  Goal: Readiness for Transition of Care  Outcome: Ongoing, Progressing     Problem: Fall Injury Risk  Goal: Absence of Fall and Fall-Related Injury  Outcome: Ongoing, Progressing     Problem: Fluid and Electrolyte Imbalance (Acute Kidney Injury/Impairment)  Goal: Fluid and Electrolyte Balance  Outcome: Ongoing, Progressing     Problem: Oral Intake Inadequate (Acute Kidney Injury/Impairment)  Goal: Optimal Nutrition Intake  Outcome: Ongoing, Progressing     Problem: Renal Function Impairment (Acute Kidney Injury/Impairment)  Goal: Effective Renal Function  Outcome: Ongoing, Progressing     Problem: Infection  Goal: Absence of Infection Signs and Symptoms  Outcome: Ongoing, Progressing     Problem: Impaired Wound Healing  Goal: Optimal Wound Healing  Outcome: Ongoing, Progressing     Problem: Skin Injury Risk Increased  Goal: Skin Health and Integrity  Outcome: Ongoing, Progressing     Problem: Coping Ineffective  Goal: Effective Coping  Outcome: Ongoing, Progressing

## 2023-01-21 NOTE — ASSESSMENT & PLAN NOTE
Elevated Creatinine of 1.4 on admission with a baseline of 1.0.  Likely due to hypovolemia from GI losses.  Appreciate Nephrology recommendations.  S/p IV fluids with improvement of creatinine back to normal - 0.7 today  -Monitor

## 2023-01-21 NOTE — ASSESSMENT & PLAN NOTE
Dysuria and suprapubic tenderness on admission.  UA with leukocytes +3 and nitrites and Urine culture pending.  Previous positive urine culture with E Coli in 2021, sensitive to Ceftriaxone and completed 3 days on 1/20/2023.

## 2023-01-21 NOTE — ASSESSMENT & PLAN NOTE
Patient reports non-bloody diarrhea for 6 days, associated with loss of appetite, nausea, non-bloody vomiting, lethargy, chills.  Her last chemotherapy was on 1/6/2023.  Creatinine was 1.4 on admission with significant electrolyte abnormalities, including Calcium, Magnesium, Phos.  Uric Acid was 13.2. Patient with known Hyponatremia due to SIADH and Na near her baseline. No significant diarrhea while in the ED or since admission.  Recent stool studies on 12/25/2023 negative.  Given 1.5L NS in ED and electrolytes replaced.  Diarrhea had resolved for over 24 hours, but recurrent this morning x 2 episode (watery and cramping).  C Diff canceled given resolution of diarrhea, but reordered and positive Ag/PCR.  Started po Vanc today.    Plan:  Continue po Vanc 125mg q6  Continue special precautions and isolation  Continue to monitor fluid status and replace electrolytes as needed

## 2023-01-21 NOTE — SUBJECTIVE & OBJECTIVE
Interval History: Patient is awake and alert this morning.  She tested positive for Cdiff and started on po Vancomycin. No fevers.  No significant abdominal pain.  Stable breathing, but more wheezing today.    Review of Systems   Constitutional:  Positive for appetite change (improved) and fatigue (improving). Negative for chills and fever.   HENT:  Negative for congestion, ear pain and sore throat.    Eyes:  Negative for pain and redness.   Respiratory:  Positive for shortness of breath (stable on 3L O2NC at home) and wheezing. Negative for cough.    Cardiovascular:  Negative for chest pain and leg swelling.   Gastrointestinal:  Positive for diarrhea (recurrent). Negative for nausea and vomiting.   Genitourinary:  Negative for dysuria and flank pain.   Musculoskeletal:  Positive for arthralgias (chronic). Negative for myalgias and neck pain.   Skin:  Negative for rash and wound.   Neurological:  Negative for weakness, numbness and headaches.   Psychiatric/Behavioral:  Negative for agitation, behavioral problems and confusion. The patient is not nervous/anxious.    Objective:     Vital Signs (Most Recent):  Temp: 97.5 °F (36.4 °C) (01/21/23 1151)  Pulse: 86 (01/21/23 1151)  Resp: 20 (01/21/23 1151)  BP: 136/65 (01/21/23 1151)  SpO2: 97 % (01/21/23 1151)   Vital Signs (24h Range):  Temp:  [97.5 °F (36.4 °C)-98.2 °F (36.8 °C)] 97.5 °F (36.4 °C)  Pulse:  [] 86  Resp:  [16-20] 20  SpO2:  [95 %-99 %] 97 %  BP: (112-137)/(53-71) 136/65     Weight: 96.2 kg (212 lb 1.3 oz)  Body mass index is 34.23 kg/m².    Intake/Output Summary (Last 24 hours) at 1/21/2023 1212  Last data filed at 1/21/2023 0733  Gross per 24 hour   Intake 1200 ml   Output 650 ml   Net 550 ml        Physical Exam  Vitals and nursing note reviewed.   Constitutional:       General: She is not in acute distress.     Appearance: She is obese. She is ill-appearing (chronically). She is not toxic-appearing.   HENT:      Head: Normocephalic and atraumatic.       Right Ear: External ear normal.      Left Ear: External ear normal.      Nose: Nose normal. No congestion or rhinorrhea.      Mouth/Throat:      Mouth: Mucous membranes are moist.      Pharynx: Oropharynx is clear.   Eyes:      General: No scleral icterus.        Right eye: No discharge.         Left eye: No discharge.      Pupils: Pupils are equal, round, and reactive to light.   Cardiovascular:      Rate and Rhythm: Normal rate and regular rhythm.      Pulses: Normal pulses.      Heart sounds: Normal heart sounds.   Pulmonary:      Effort: Pulmonary effort is normal.      Breath sounds: Normal breath sounds. No wheezing or rales.      Comments: Stable on home 3L O2 NC  Abdominal:      General: Bowel sounds are normal. There is no distension.      Tenderness: There is no abdominal tenderness.   Musculoskeletal:      Cervical back: Normal range of motion.      Right lower leg: Edema (trace edema) present.      Left lower leg: Edema (trace edema) present.   Skin:     General: Skin is warm and dry.   Neurological:      General: No focal deficit present.      Mental Status: She is alert and oriented to person, place, and time. Mental status is at baseline.   Psychiatric:         Mood and Affect: Mood normal.         Behavior: Behavior normal.       Significant Labs: All pertinent labs within the past 24 hours have been reviewed.    Significant Imaging: I have reviewed all pertinent imaging results/findings within the past 24 hours.

## 2023-01-21 NOTE — PROGRESS NOTES
[facility] Medicine  Progress Note    Patient Name: Nalini Varma  MRN: [id number]  Patient Class: IP- Inpatient   Admission Date: 1/17/2023  Length of Stay: 4 days  Attending Physician: Paras Antonio MD  Primary Care Provider: Yane Sahni MD        Subjective:     Principal Problem:Hypovolemia dehydration    HPI:  Nalini Varma is a 72 year old female with PMHx of COPD (on home O2 3L) with 53 pack year smoking hx, current smoker, lung CA (currently on radiation and chemo) chronic anemia, anxiety, CKD Stage 3, systolic and diastolic heart failure (EF 55%, Grade II diastolic dysfunction, 12/24/22) presenting to ER for hypotension and increased shortness of breath for 6 days. Patient reports non-bloody diarrhea for 6 days, associated with loss of appetite, nausea, non-bloody vomiting, lethargy, chills. Patient also reports increased SOB but denies purulent sputum, sore throat or fever. Patient denies orthopnea or bilateral lower extremity swelling. Patient also reports dysuria and suprapubic tenderness.     H/H 8.4/25.4 elevated from baseline 7.7/23.3, likely due to hypovolemia. No leukocytosis but elevated lactate 2.3. UA with nitrites and +3 leukocytes. Hyponatremia 127 (baseline 133), hypocalcemia 8.2 (corrected for hypoalbuminemia 1.2). Cr elevated 1.4 from baseline 1.0, and an elevated AG 18. CXR with findings of  edema noting left pleural effusion. BNP elevated at 139. Patient was given IV calcium gluconate 1g, IV Mag sulfate 2g and IV NaCl 0.9% 1.5L in ER. Nephrology and Pulmonology was consulted. Patient was started on IV Ceftriaxone 1g for symptomatic UTI.    Patient is admitted to inpatient service with Hospital Medicine for management of electrolyte abnormalities, left pleural effusion and symptomatic UTI.       Overview/Hospital Course:  See below      Interval History: Patient is awake and alert this morning.  She tested positive for Cdiff and started on po  Vancomycin. No fevers.  No significant abdominal pain.  Stable breathing, but more wheezing today.    Review of Systems   Constitutional:  Positive for appetite change (improved) and fatigue (improving). Negative for chills and fever.   HENT:  Negative for congestion, ear pain and sore throat.    Eyes:  Negative for pain and redness.   Respiratory:  Positive for shortness of breath (stable on 3L O2NC at home) and wheezing. Negative for cough.    Cardiovascular:  Negative for chest pain and leg swelling.   Gastrointestinal:  Positive for diarrhea (recurrent). Negative for nausea and vomiting.   Genitourinary:  Negative for dysuria and flank pain.   Musculoskeletal:  Positive for arthralgias (chronic). Negative for myalgias and neck pain.   Skin:  Negative for rash and wound.   Neurological:  Negative for weakness, numbness and headaches.   Psychiatric/Behavioral:  Negative for agitation, behavioral problems and confusion. The patient is not nervous/anxious.    Objective:     Vital Signs (Most Recent):  Temp: 97.5 °F (36.4 °C) (01/21/23 1151)  Pulse: 86 (01/21/23 1151)  Resp: 20 (01/21/23 1151)  BP: 136/65 (01/21/23 1151)  SpO2: 97 % (01/21/23 1151)   Vital Signs (24h Range):  Temp:  [97.5 °F (36.4 °C)-98.2 °F (36.8 °C)] 97.5 °F (36.4 °C)  Pulse:  [] 86  Resp:  [16-20] 20  SpO2:  [95 %-99 %] 97 %  BP: (112-137)/(53-71) 136/65     Weight: 96.2 kg (212 lb 1.3 oz)  Body mass index is 34.23 kg/m².    Intake/Output Summary (Last 24 hours) at 1/21/2023 1212  Last data filed at 1/21/2023 0733  Gross per 24 hour   Intake 1200 ml   Output 650 ml   Net 550 ml        Physical Exam  Vitals and nursing note reviewed.   Constitutional:       General: She is not in acute distress.     Appearance: She is obese. She is ill-appearing (chronically). She is not toxic-appearing.   HENT:      Head: Normocephalic and atraumatic.      Right Ear: External ear normal.      Left Ear: External ear normal.      Nose: Nose normal. No  congestion or rhinorrhea.      Mouth/Throat:      Mouth: Mucous membranes are moist.      Pharynx: Oropharynx is clear.   Eyes:      General: No scleral icterus.        Right eye: No discharge.         Left eye: No discharge.      Pupils: Pupils are equal, round, and reactive to light.   Cardiovascular:      Rate and Rhythm: Normal rate and regular rhythm.      Pulses: Normal pulses.      Heart sounds: Normal heart sounds.   Pulmonary:      Effort: Pulmonary effort is normal.      Breath sounds: Normal breath sounds. No wheezing or rales.      Comments: Stable on home 3L O2 NC  Abdominal:      General: Bowel sounds are normal. There is no distension.      Tenderness: There is no abdominal tenderness.   Musculoskeletal:      Cervical back: Normal range of motion.      Right lower leg: Edema (trace edema) present.      Left lower leg: Edema (trace edema) present.   Skin:     General: Skin is warm and dry.   Neurological:      General: No focal deficit present.      Mental Status: She is alert and oriented to person, place, and time. Mental status is at baseline.   Psychiatric:         Mood and Affect: Mood normal.         Behavior: Behavior normal.       Significant Labs: All pertinent labs within the past 24 hours have been reviewed.    Significant Imaging: I have reviewed all pertinent imaging results/findings within the past 24 hours.      Assessment/Plan:      * C Diff Diarrhea complicated by Hypovolemia and Electrolyte abnormalities  Patient reports non-bloody diarrhea for 6 days, associated with loss of appetite, nausea, non-bloody vomiting, lethargy, chills.  Her last chemotherapy was on 1/6/2023.  Creatinine was 1.4 on admission with significant electrolyte abnormalities, including Calcium, Magnesium, Phos.  Uric Acid was 13.2. Patient with known Hyponatremia due to SIADH and Na near her baseline. No significant diarrhea while in the ED or since admission.  Recent stool studies on 12/25/2023 negative.  Given  "1.5L NS in ED and electrolytes replaced.  Diarrhea had resolved for over 24 hours, but recurrent this morning x 2 episode (watery and cramping).  C Diff canceled given resolution of diarrhea, but reordered and positive Ag/PCR.  Started po Vanc today.    Plan:  Continue po Vanc 125mg q6  Continue special precautions and isolation  Continue to monitor fluid status and replace electrolytes as needed            Acute on chronic respiratory failure with hypoxia and hypercapnia  Patient presented with increased SOB from her baseline.  She still smokes 1ppd and on home O2 at 3L NC.  Likely from COPD exacerbation. CXR on admission with findings suggest edema noting left pleural effusion.  Increased parenchymal attenuation projects over the right upper lung zone, developing consolidation is not excluded noting the appearance is similar to previous exams.  Labs on admission with Lactic Acid elevated at 2.3 and normalized.  .  Improved and stable.    Patient seen by Pulmonary and "The patient's working assessments include diarrhea, COPD, small L sided pleural effusion by POCUS.  Do not suspect the chronic effusion is operant in her current admit and do not advise tap.  Continue home COPD regimen".  Lung exam with much improved wheezing.    Plan:  Continue with O2 at tolerated to keep SpO2 >92%   Continue with Duonebs Q4 wake  Encourage smoking cessation  Strict intake and output      Urinary tract infection without hematuria  Dysuria and suprapubic tenderness on admission.  UA with leukocytes +3 and nitrites and Urine culture pending.  Previous positive urine culture with E Coli in 2021, sensitive to Ceftriaxone and completed 3 days on 1/20/2023.      Acute renal failure superimposed on stage 3 chronic kidney disease  Elevated Creatinine of 1.4 on admission with a baseline of 1.0.  Likely due to hypovolemia from GI losses.  Appreciate Nephrology recommendations.  S/p IV fluids with improvement of creatinine back to " normal - 0.7 today  -Monitor    Syndrome of inappropriate secretion of antidiuretic hormone  Na 127 on admission and improved at 136 this morning  -Monitor      Chronic diastolic heart failure  TTE in 12/2022 with EF of 55% and Grade II left ventricular diastolic dysfunction.  .    -See above      Chronic obstructive pulmonary disease  As above      Electrolyte abnormality  Hyponatremia, hypokalemia, hypomagnesemia, hypocalcemia, hypoalbuminemia  Nephrology consulted, appreciate input. Urine osm, Na, Cr, uric acid, serum urice acid, TSH, BNP checked.     Plan:  - PO KCl 20 mEq TID   - IV Mag sulfate 2g replacement   - Consult dietitian for hypoalbuminemia   - Cardiac diet with 1L fluid restriction  - Q4 BMP with mag and phos  - Cardiac monitoring  - Strict intake and output  - IV NaCl 75ml/hr     Advance care planning  Patient wants to continue full code, with chest compressions and mechanical ventilation.      Recurrent adenocarcinoma of left lung  Patient is currently on chemo (last on 1/6/23) and radiation.  Reports missing radiation the whole of last week      MDD (major depressive disorder), recurrent severe, without psychosis  Patient has persistent depression which is unknown and is currently controlled. Will Continue anti-depressant medications. We will not consult psychiatry at this time. Patient does not display psychosis at this time. Continue to monitor closely and adjust plan of care as needed.    - As above        Hypocalcemia  As above      Hyponatremia  As above      Debility  Patient mostly confined to Wheelchair after previously using Rolator at home.  Has chronic pain from OA of Knees, chronic LBP and Left Shoulder pain. Deconditioned after her diarrheal illness as above.  She lives alone.    -Consulted PT - recommends home PT on discharge      Hypomagnesemia  As above      Generalized anxiety disorder  Hx of anxiety, MDD - Chronic. Controlled.  -Continue with Alprazolam 0.25mg  PRN  -Continue home meds of Duloxetine 60mg daily, Trazodone 100mg qhs PRN      Tobacco dependence  Smokes 1ppd.  Patient denies need for nicotine patches. Wants to quit cold turkey.  Discussed dangers of smoking with use of home oxygen.  -Continue to encourage smoking cessation      Essential hypertension  Hypotensive from GI losses and sepsis secondary to UTI   -Continue with Metoprolol XL 50mg  -Continue to hold home dose losartan 100mg PO daily        VTE Risk Mitigation (From admission, onward)         Ordered     enoxaparin injection 40 mg  Daily         01/17/23 2236     IP VTE HIGH RISK PATIENT  Once         01/17/23 2236     Place sequential compression device  Until discontinued         01/17/23 2236                Discharge Planning   FELICIANO:      Code Status: Full Code   Is the patient medically ready for discharge?:     Reason for patient still in hospital (select all that apply): Patient trending condition, Treatment and Consult recommendations  Discharge Plan A: Home                  Paras Antonio MD  Department of Hospital Medicine   [facility] Surg ([place])

## 2023-01-22 LAB
ALBUMIN SERPL BCP-MCNC: 2 G/DL (ref 3.5–5.2)
ANION GAP SERPL CALC-SCNC: 3 MMOL/L (ref 8–16)
BACTERIA BLD CULT: NORMAL
BACTERIA BLD CULT: NORMAL
BACTERIA UR CULT: ABNORMAL
BASOPHILS # BLD AUTO: 0 K/UL (ref 0–0.2)
BASOPHILS NFR BLD: 0 % (ref 0–1.9)
BUN SERPL-MCNC: 15 MG/DL (ref 8–23)
CALCIUM SERPL-MCNC: 8.4 MG/DL (ref 8.7–10.5)
CHLORIDE SERPL-SCNC: 109 MMOL/L (ref 95–110)
CO2 SERPL-SCNC: 22 MMOL/L (ref 23–29)
CREAT SERPL-MCNC: 0.7 MG/DL (ref 0.5–1.4)
DIFFERENTIAL METHOD: ABNORMAL
EOSINOPHIL # BLD AUTO: 0 K/UL (ref 0–0.5)
EOSINOPHIL NFR BLD: 0.4 % (ref 0–8)
ERYTHROCYTE [DISTWIDTH] IN BLOOD BY AUTOMATED COUNT: 18 % (ref 11.5–14.5)
EST. GFR  (NO RACE VARIABLE): >60 ML/MIN/1.73 M^2
GLUCOSE SERPL-MCNC: 97 MG/DL (ref 70–110)
HCT VFR BLD AUTO: 23.3 % (ref 37–48.5)
HGB BLD-MCNC: 7.5 G/DL (ref 12–16)
IMM GRANULOCYTES # BLD AUTO: 0.06 K/UL (ref 0–0.04)
IMM GRANULOCYTES NFR BLD AUTO: 1.2 % (ref 0–0.5)
LYMPHOCYTES # BLD AUTO: 0.9 K/UL (ref 1–4.8)
LYMPHOCYTES NFR BLD: 18.4 % (ref 18–48)
MCH RBC QN AUTO: 32.2 PG (ref 27–31)
MCHC RBC AUTO-ENTMCNC: 32.2 G/DL (ref 32–36)
MCV RBC AUTO: 100 FL (ref 82–98)
MONOCYTES # BLD AUTO: 0.8 K/UL (ref 0.3–1)
MONOCYTES NFR BLD: 16.6 % (ref 4–15)
NEUTROPHILS # BLD AUTO: 3.2 K/UL (ref 1.8–7.7)
NEUTROPHILS NFR BLD: 63.4 % (ref 38–73)
NRBC BLD-RTO: 0 /100 WBC
PHOSPHATE SERPL-MCNC: 3.3 MG/DL (ref 2.7–4.5)
PLATELET # BLD AUTO: 207 K/UL (ref 150–450)
PMV BLD AUTO: 9.8 FL (ref 9.2–12.9)
POTASSIUM SERPL-SCNC: 6.2 MMOL/L (ref 3.5–5.1)
RBC # BLD AUTO: 2.33 M/UL (ref 4–5.4)
SODIUM SERPL-SCNC: 134 MMOL/L (ref 136–145)
WBC # BLD AUTO: 4.99 K/UL (ref 3.9–12.7)

## 2023-01-22 PROCEDURE — 80069 RENAL FUNCTION PANEL: CPT | Performed by: STUDENT IN AN ORGANIZED HEALTH CARE EDUCATION/TRAINING PROGRAM

## 2023-01-22 PROCEDURE — 27000207 HC ISOLATION

## 2023-01-22 PROCEDURE — 94640 AIRWAY INHALATION TREATMENT: CPT

## 2023-01-22 PROCEDURE — 25000003 PHARM REV CODE 250: Performed by: STUDENT IN AN ORGANIZED HEALTH CARE EDUCATION/TRAINING PROGRAM

## 2023-01-22 PROCEDURE — 99233 SBSQ HOSP IP/OBS HIGH 50: CPT | Mod: 95,,, | Performed by: STUDENT IN AN ORGANIZED HEALTH CARE EDUCATION/TRAINING PROGRAM

## 2023-01-22 PROCEDURE — 25000003 PHARM REV CODE 250

## 2023-01-22 PROCEDURE — 25000242 PHARM REV CODE 250 ALT 637 W/ HCPCS: Performed by: INTERNAL MEDICINE

## 2023-01-22 PROCEDURE — 99900035 HC TECH TIME PER 15 MIN (STAT)

## 2023-01-22 PROCEDURE — 27000221 HC OXYGEN, UP TO 24 HOURS

## 2023-01-22 PROCEDURE — 21400001 HC TELEMETRY ROOM

## 2023-01-22 PROCEDURE — 63600175 PHARM REV CODE 636 W HCPCS

## 2023-01-22 PROCEDURE — 94761 N-INVAS EAR/PLS OXIMETRY MLT: CPT

## 2023-01-22 PROCEDURE — 63600175 PHARM REV CODE 636 W HCPCS: Performed by: INTERNAL MEDICINE

## 2023-01-22 PROCEDURE — 85025 COMPLETE CBC W/AUTO DIFF WBC: CPT | Performed by: STUDENT IN AN ORGANIZED HEALTH CARE EDUCATION/TRAINING PROGRAM

## 2023-01-22 PROCEDURE — 99233 PR SUBSEQUENT HOSPITAL CARE,LEVL III: ICD-10-PCS | Mod: 95,,, | Performed by: STUDENT IN AN ORGANIZED HEALTH CARE EDUCATION/TRAINING PROGRAM

## 2023-01-22 PROCEDURE — 25000242 PHARM REV CODE 250 ALT 637 W/ HCPCS

## 2023-01-22 PROCEDURE — 36415 COLL VENOUS BLD VENIPUNCTURE: CPT | Performed by: STUDENT IN AN ORGANIZED HEALTH CARE EDUCATION/TRAINING PROGRAM

## 2023-01-22 PROCEDURE — 25000003 PHARM REV CODE 250: Performed by: INTERNAL MEDICINE

## 2023-01-22 RX ORDER — HYDRALAZINE HYDROCHLORIDE 25 MG/1
25 TABLET, FILM COATED ORAL 3 TIMES DAILY PRN
Status: DISCONTINUED | OUTPATIENT
Start: 2023-01-22 | End: 2023-01-24 | Stop reason: HOSPADM

## 2023-01-22 RX ADMIN — IPRATROPIUM BROMIDE AND ALBUTEROL SULFATE 3 ML: 2.5; .5 SOLUTION RESPIRATORY (INHALATION) at 08:01

## 2023-01-22 RX ADMIN — HYDROCODONE BITARTRATE AND ACETAMINOPHEN 1 TABLET: 10; 325 TABLET ORAL at 05:01

## 2023-01-22 RX ADMIN — TRAZODONE HYDROCHLORIDE 100 MG: 100 TABLET ORAL at 09:01

## 2023-01-22 RX ADMIN — Medication 250 MG: at 10:01

## 2023-01-22 RX ADMIN — FLUTICASONE FUROATE AND VILANTEROL TRIFENATATE 1 PUFF: 100; 25 POWDER RESPIRATORY (INHALATION) at 08:01

## 2023-01-22 RX ADMIN — SODIUM ZIRCONIUM CYCLOSILICATE 10 G: 10 POWDER, FOR SUSPENSION ORAL at 11:01

## 2023-01-22 RX ADMIN — GABAPENTIN 300 MG: 300 CAPSULE ORAL at 04:01

## 2023-01-22 RX ADMIN — AZELASTINE HYDROCHLORIDE 137 MCG: 137 SPRAY, METERED NASAL at 08:01

## 2023-01-22 RX ADMIN — Medication 1000 UNITS: at 08:01

## 2023-01-22 RX ADMIN — Medication 400 MG: at 08:01

## 2023-01-22 RX ADMIN — VANCOMYCIN HYDROCHLORIDE 125 MG: KIT at 05:01

## 2023-01-22 RX ADMIN — DULOXETINE 60 MG: 30 CAPSULE, DELAYED RELEASE ORAL at 08:01

## 2023-01-22 RX ADMIN — GABAPENTIN 300 MG: 300 CAPSULE ORAL at 08:01

## 2023-01-22 RX ADMIN — ALPRAZOLAM 0.25 MG: 0.25 TABLET ORAL at 08:01

## 2023-01-22 RX ADMIN — GUAIFENESIN 1200 MG: 600 TABLET, EXTENDED RELEASE ORAL at 08:01

## 2023-01-22 RX ADMIN — HYDRALAZINE HYDROCHLORIDE 25 MG: 25 TABLET, FILM COATED ORAL at 05:01

## 2023-01-22 RX ADMIN — IPRATROPIUM BROMIDE AND ALBUTEROL SULFATE 3 ML: 2.5; .5 SOLUTION RESPIRATORY (INHALATION) at 01:01

## 2023-01-22 RX ADMIN — VANCOMYCIN HYDROCHLORIDE 125 MG: KIT at 11:01

## 2023-01-22 RX ADMIN — COLLAGENASE SANTYL: 250 OINTMENT TOPICAL at 08:01

## 2023-01-22 RX ADMIN — ATORVASTATIN CALCIUM 40 MG: 20 TABLET, FILM COATED ORAL at 08:01

## 2023-01-22 RX ADMIN — Medication 400 MG: at 09:01

## 2023-01-22 RX ADMIN — GUAIFENESIN 1200 MG: 600 TABLET, EXTENDED RELEASE ORAL at 09:01

## 2023-01-22 RX ADMIN — FLUTICASONE PROPIONATE 50 MCG: 50 SPRAY, METERED NASAL at 08:01

## 2023-01-22 RX ADMIN — VANCOMYCIN HYDROCHLORIDE 125 MG: KIT at 12:01

## 2023-01-22 RX ADMIN — METOPROLOL SUCCINATE 50 MG: 50 TABLET, EXTENDED RELEASE ORAL at 08:01

## 2023-01-22 RX ADMIN — CEFTRIAXONE 1 G: 1 INJECTION, SOLUTION INTRAVENOUS at 05:01

## 2023-01-22 RX ADMIN — ENOXAPARIN SODIUM 40 MG: 40 INJECTION SUBCUTANEOUS at 04:01

## 2023-01-22 RX ADMIN — AZELASTINE HYDROCHLORIDE 137 MCG: 137 SPRAY, METERED NASAL at 09:01

## 2023-01-22 RX ADMIN — GABAPENTIN 300 MG: 300 CAPSULE ORAL at 09:01

## 2023-01-22 RX ADMIN — IPRATROPIUM BROMIDE AND ALBUTEROL SULFATE 3 ML: 2.5; .5 SOLUTION RESPIRATORY (INHALATION) at 05:01

## 2023-01-22 RX ADMIN — PANTOPRAZOLE SODIUM 40 MG: 40 TABLET, DELAYED RELEASE ORAL at 08:01

## 2023-01-22 NOTE — CONSULTS
Thank you for your consult to [facility]. We have reviewed the patient chart. This patient does meet criteria for [facility] service at this time. Will assume care on 01/22/23 at 6AM

## 2023-01-22 NOTE — PLAN OF CARE
POC reviewed with patient. AAOx4. VSS. Complaints of pain, PRN given according to MAR.  Received IV antibiotics according to MAR. Purewick utilized for voiding.  Instructed to call for help ambulating. No injuries, falls, or trauma occurred during shift. Purposeful rounding completed. Bed low and locked with side rails up x 3 and call light within reach. Precuations maintained.

## 2023-01-22 NOTE — PLAN OF CARE
POC reviewed with pt and she verbalized understanding. VSS on 2L NC. C/o MEDELLIN. PRN meds given per pt request. Shifts weight in bed independently. Safety precautions in place. Will continue with plan of care.    Problem: Adult Inpatient Plan of Care  Goal: Plan of Care Review  Outcome: Ongoing, Progressing  Goal: Patient-Specific Goal (Individualized)  Outcome: Ongoing, Progressing  Goal: Absence of Hospital-Acquired Illness or Injury  Outcome: Ongoing, Progressing  Goal: Optimal Comfort and Wellbeing  Outcome: Ongoing, Progressing  Goal: Readiness for Transition of Care  Outcome: Ongoing, Progressing     Problem: Fall Injury Risk  Goal: Absence of Fall and Fall-Related Injury  Outcome: Ongoing, Progressing     Problem: Fluid and Electrolyte Imbalance (Acute Kidney Injury/Impairment)  Goal: Fluid and Electrolyte Balance  Outcome: Ongoing, Progressing     Problem: Oral Intake Inadequate (Acute Kidney Injury/Impairment)  Goal: Optimal Nutrition Intake  Outcome: Ongoing, Progressing     Problem: Renal Function Impairment (Acute Kidney Injury/Impairment)  Goal: Effective Renal Function  Outcome: Ongoing, Progressing     Problem: Infection  Goal: Absence of Infection Signs and Symptoms  Outcome: Ongoing, Progressing     Problem: Impaired Wound Healing  Goal: Optimal Wound Healing  Outcome: Ongoing, Progressing     Problem: Skin Injury Risk Increased  Goal: Skin Health and Integrity  Outcome: Ongoing, Progressing     Problem: Coping Ineffective  Goal: Effective Coping  Outcome: Ongoing, Progressing

## 2023-01-23 LAB
ALBUMIN SERPL BCP-MCNC: 1.9 G/DL (ref 3.5–5.2)
ANION GAP SERPL CALC-SCNC: 4 MMOL/L (ref 8–16)
BASOPHILS # BLD AUTO: 0 K/UL (ref 0–0.2)
BASOPHILS NFR BLD: 0 % (ref 0–1.9)
BUN SERPL-MCNC: 12 MG/DL (ref 8–23)
CALCIUM SERPL-MCNC: 8.8 MG/DL (ref 8.7–10.5)
CHLORIDE SERPL-SCNC: 107 MMOL/L (ref 95–110)
CO2 SERPL-SCNC: 23 MMOL/L (ref 23–29)
CREAT SERPL-MCNC: 0.7 MG/DL (ref 0.5–1.4)
DIFFERENTIAL METHOD: ABNORMAL
EOSINOPHIL # BLD AUTO: 0 K/UL (ref 0–0.5)
EOSINOPHIL NFR BLD: 1.1 % (ref 0–8)
ERYTHROCYTE [DISTWIDTH] IN BLOOD BY AUTOMATED COUNT: 17.7 % (ref 11.5–14.5)
EST. GFR  (NO RACE VARIABLE): >60 ML/MIN/1.73 M^2
GLUCOSE SERPL-MCNC: 95 MG/DL (ref 70–110)
HCT VFR BLD AUTO: 22.7 % (ref 37–48.5)
HGB BLD-MCNC: 7.1 G/DL (ref 12–16)
IMM GRANULOCYTES # BLD AUTO: 0.03 K/UL (ref 0–0.04)
IMM GRANULOCYTES NFR BLD AUTO: 0.9 % (ref 0–0.5)
LYMPHOCYTES # BLD AUTO: 0.9 K/UL (ref 1–4.8)
LYMPHOCYTES NFR BLD: 26 % (ref 18–48)
MCH RBC QN AUTO: 31.4 PG (ref 27–31)
MCHC RBC AUTO-ENTMCNC: 31.3 G/DL (ref 32–36)
MCV RBC AUTO: 100 FL (ref 82–98)
MONOCYTES # BLD AUTO: 0.7 K/UL (ref 0.3–1)
MONOCYTES NFR BLD: 20.6 % (ref 4–15)
NEUTROPHILS # BLD AUTO: 1.8 K/UL (ref 1.8–7.7)
NEUTROPHILS NFR BLD: 51.4 % (ref 38–73)
NRBC BLD-RTO: 0 /100 WBC
PHOSPHATE SERPL-MCNC: 4.1 MG/DL (ref 2.7–4.5)
PLATELET # BLD AUTO: 204 K/UL (ref 150–450)
PMV BLD AUTO: 9.4 FL (ref 9.2–12.9)
POTASSIUM SERPL-SCNC: 6 MMOL/L (ref 3.5–5.1)
RBC # BLD AUTO: 2.26 M/UL (ref 4–5.4)
SODIUM SERPL-SCNC: 134 MMOL/L (ref 136–145)
WBC # BLD AUTO: 3.5 K/UL (ref 3.9–12.7)

## 2023-01-23 PROCEDURE — 25000242 PHARM REV CODE 250 ALT 637 W/ HCPCS: Performed by: PHYSICIAN ASSISTANT

## 2023-01-23 PROCEDURE — 94640 AIRWAY INHALATION TREATMENT: CPT

## 2023-01-23 PROCEDURE — 27000221 HC OXYGEN, UP TO 24 HOURS

## 2023-01-23 PROCEDURE — 99233 SBSQ HOSP IP/OBS HIGH 50: CPT | Mod: 95,,, | Performed by: STUDENT IN AN ORGANIZED HEALTH CARE EDUCATION/TRAINING PROGRAM

## 2023-01-23 PROCEDURE — 25000242 PHARM REV CODE 250 ALT 637 W/ HCPCS

## 2023-01-23 PROCEDURE — 80069 RENAL FUNCTION PANEL: CPT | Performed by: STUDENT IN AN ORGANIZED HEALTH CARE EDUCATION/TRAINING PROGRAM

## 2023-01-23 PROCEDURE — 99233 PR SUBSEQUENT HOSPITAL CARE,LEVL III: ICD-10-PCS | Mod: 95,,, | Performed by: STUDENT IN AN ORGANIZED HEALTH CARE EDUCATION/TRAINING PROGRAM

## 2023-01-23 PROCEDURE — 21400001 HC TELEMETRY ROOM

## 2023-01-23 PROCEDURE — 63600175 PHARM REV CODE 636 W HCPCS

## 2023-01-23 PROCEDURE — 36415 COLL VENOUS BLD VENIPUNCTURE: CPT | Performed by: STUDENT IN AN ORGANIZED HEALTH CARE EDUCATION/TRAINING PROGRAM

## 2023-01-23 PROCEDURE — 85025 COMPLETE CBC W/AUTO DIFF WBC: CPT | Performed by: STUDENT IN AN ORGANIZED HEALTH CARE EDUCATION/TRAINING PROGRAM

## 2023-01-23 PROCEDURE — 25000003 PHARM REV CODE 250: Performed by: STUDENT IN AN ORGANIZED HEALTH CARE EDUCATION/TRAINING PROGRAM

## 2023-01-23 PROCEDURE — 25000003 PHARM REV CODE 250: Performed by: INTERNAL MEDICINE

## 2023-01-23 PROCEDURE — 25000003 PHARM REV CODE 250

## 2023-01-23 PROCEDURE — 27000207 HC ISOLATION

## 2023-01-23 PROCEDURE — 99900035 HC TECH TIME PER 15 MIN (STAT)

## 2023-01-23 PROCEDURE — 94761 N-INVAS EAR/PLS OXIMETRY MLT: CPT

## 2023-01-23 PROCEDURE — 63600175 PHARM REV CODE 636 W HCPCS: Performed by: INTERNAL MEDICINE

## 2023-01-23 RX ORDER — IPRATROPIUM BROMIDE AND ALBUTEROL SULFATE 2.5; .5 MG/3ML; MG/3ML
3 SOLUTION RESPIRATORY (INHALATION) ONCE
Status: COMPLETED | OUTPATIENT
Start: 2023-01-23 | End: 2023-01-23

## 2023-01-23 RX ADMIN — GABAPENTIN 300 MG: 300 CAPSULE ORAL at 08:01

## 2023-01-23 RX ADMIN — FLUTICASONE FUROATE AND VILANTEROL TRIFENATATE 1 PUFF: 100; 25 POWDER RESPIRATORY (INHALATION) at 12:01

## 2023-01-23 RX ADMIN — VANCOMYCIN HYDROCHLORIDE 125 MG: KIT at 12:01

## 2023-01-23 RX ADMIN — HYDROCODONE BITARTRATE AND ACETAMINOPHEN 1 TABLET: 10; 325 TABLET ORAL at 08:01

## 2023-01-23 RX ADMIN — VANCOMYCIN HYDROCHLORIDE 125 MG: KIT at 05:01

## 2023-01-23 RX ADMIN — AZELASTINE HYDROCHLORIDE 137 MCG: 137 SPRAY, METERED NASAL at 08:01

## 2023-01-23 RX ADMIN — PANTOPRAZOLE SODIUM 40 MG: 40 TABLET, DELAYED RELEASE ORAL at 08:01

## 2023-01-23 RX ADMIN — CEFTRIAXONE 1 G: 1 INJECTION, SOLUTION INTRAVENOUS at 06:01

## 2023-01-23 RX ADMIN — SODIUM ZIRCONIUM CYCLOSILICATE 10 G: 10 POWDER, FOR SUSPENSION ORAL at 02:01

## 2023-01-23 RX ADMIN — GUAIFENESIN 1200 MG: 600 TABLET, EXTENDED RELEASE ORAL at 08:01

## 2023-01-23 RX ADMIN — TRAZODONE HYDROCHLORIDE 100 MG: 100 TABLET ORAL at 08:01

## 2023-01-23 RX ADMIN — DULOXETINE 60 MG: 30 CAPSULE, DELAYED RELEASE ORAL at 08:01

## 2023-01-23 RX ADMIN — IPRATROPIUM BROMIDE AND ALBUTEROL SULFATE 3 ML: .5; 3 SOLUTION RESPIRATORY (INHALATION) at 06:01

## 2023-01-23 RX ADMIN — FLUTICASONE PROPIONATE 50 MCG: 50 SPRAY, METERED NASAL at 06:01

## 2023-01-23 RX ADMIN — IPRATROPIUM BROMIDE AND ALBUTEROL SULFATE 3 ML: 2.5; .5 SOLUTION RESPIRATORY (INHALATION) at 04:01

## 2023-01-23 RX ADMIN — Medication 250 MG: at 08:01

## 2023-01-23 RX ADMIN — SODIUM ZIRCONIUM CYCLOSILICATE 10 G: 10 POWDER, FOR SUSPENSION ORAL at 10:01

## 2023-01-23 RX ADMIN — FLUTICASONE FUROATE AND VILANTEROL TRIFENATATE 1 PUFF: 100; 25 POWDER RESPIRATORY (INHALATION) at 07:01

## 2023-01-23 RX ADMIN — Medication 1000 UNITS: at 08:01

## 2023-01-23 RX ADMIN — FLUTICASONE PROPIONATE 50 MCG: 50 SPRAY, METERED NASAL at 08:01

## 2023-01-23 RX ADMIN — GABAPENTIN 300 MG: 300 CAPSULE ORAL at 02:01

## 2023-01-23 RX ADMIN — ATORVASTATIN CALCIUM 40 MG: 20 TABLET, FILM COATED ORAL at 08:01

## 2023-01-23 RX ADMIN — METOPROLOL SUCCINATE 50 MG: 50 TABLET, EXTENDED RELEASE ORAL at 08:01

## 2023-01-23 RX ADMIN — SODIUM ZIRCONIUM CYCLOSILICATE 10 G: 10 POWDER, FOR SUSPENSION ORAL at 08:01

## 2023-01-23 RX ADMIN — IPRATROPIUM BROMIDE AND ALBUTEROL SULFATE 3 ML: 2.5; .5 SOLUTION RESPIRATORY (INHALATION) at 08:01

## 2023-01-23 RX ADMIN — IPRATROPIUM BROMIDE AND ALBUTEROL SULFATE 3 ML: 2.5; .5 SOLUTION RESPIRATORY (INHALATION) at 12:01

## 2023-01-23 RX ADMIN — HYDROCODONE BITARTRATE AND ACETAMINOPHEN 1 TABLET: 10; 325 TABLET ORAL at 06:01

## 2023-01-23 RX ADMIN — COLLAGENASE SANTYL: 250 OINTMENT TOPICAL at 08:01

## 2023-01-23 RX ADMIN — ENOXAPARIN SODIUM 40 MG: 40 INJECTION SUBCUTANEOUS at 05:01

## 2023-01-23 NOTE — PROGRESS NOTES
[facility] Medicine  Telemedicine Progress Note    Patient Name: Nalini Varma  MRN: [id number]  Patient Class: IP- Inpatient   Admission Date: 1/17/2023  Length of Stay: 5 days  Attending Physician: Elena Dahl MD  Primary Care Provider: Yane Sahni MD          Subjective:     Principal Problem:Hypovolemia dehydration        HPI:  Nalini Varma is a 72 year old female with PMHx of COPD (on home O2 3L) with 53 pack year smoking hx, current smoker, lung CA (currently on radiation and chemo) chronic anemia, anxiety, CKD Stage 3, systolic and diastolic heart failure (EF 55%, Grade II diastolic dysfunction, 12/24/22) presenting to ER for hypotension and increased shortness of breath for 6 days. Patient reports non-bloody diarrhea for 6 days, associated with loss of appetite, nausea, non-bloody vomiting, lethargy, chills. Patient also reports increased SOB but denies purulent sputum, sore throat or fever. Patient denies orthopnea or bilateral lower extremity swelling. Patient also reports dysuria and suprapubic tenderness.     H/H 8.4/25.4 elevated from baseline 7.7/23.3, likely due to hypovolemia. No leukocytosis but elevated lactate 2.3. UA with nitrites and +3 leukocytes. Hyponatremia 127 (baseline 133), hypocalcemia 8.2 (corrected for hypoalbuminemia 1.2). Cr elevated 1.4 from baseline 1.0, and an elevated AG 18. CXR with findings of  edema noting left pleural effusion. BNP elevated at 139. Patient was given IV calcium gluconate 1g, IV Mag sulfate 2g and IV NaCl 0.9% 1.5L in ER. Nephrology and Pulmonology was consulted. Patient was started on IV Ceftriaxone 1g for symptomatic UTI.    Patient is admitted to inpatient service with Hospital Medicine for management of electrolyte abnormalities, left pleural effusion and symptomatic UTI.       Overview/Hospital Course:  See below      Interval History: Patient reports diarrhea is improving. Labs significant for  hyperkalemia - given lokelma. Breathing stable on 2L NC.     Review of Systems   Constitutional:  Positive for appetite change (improved) and fatigue (improving). Negative for chills and fever.   HENT:  Negative for congestion, ear pain and sore throat.    Eyes:  Negative for pain and redness.   Respiratory:  Positive for shortness of breath (stable on 3L O2NC at home). Negative for cough.    Cardiovascular:  Negative for chest pain and leg swelling.   Gastrointestinal:  Positive for diarrhea (recurrent). Negative for nausea and vomiting.   Genitourinary:  Negative for dysuria and flank pain.   Musculoskeletal:  Positive for arthralgias (chronic). Negative for myalgias and neck pain.   Skin:  Negative for rash and wound.   Neurological:  Negative for weakness, numbness and headaches.   Psychiatric/Behavioral:  Negative for agitation, behavioral problems and confusion. The patient is not nervous/anxious.    Objective:     Vital Signs (Most Recent):  Temp: 97.8 °F (36.6 °C) (01/22/23 2111)  Pulse: 92 (01/22/23 2111)  Resp: 16 (01/22/23 2111)  BP: 139/65 (01/22/23 2111)  SpO2: 98 % (01/22/23 2111)   Vital Signs (24h Range):  Temp:  [97.5 °F (36.4 °C)-98 °F (36.7 °C)] 97.8 °F (36.6 °C)  Pulse:  [] 92  Resp:  [16-20] 16  SpO2:  [95 %-98 %] 98 %  BP: (139-177)/(65-83) 139/65     Weight: 96.2 kg (212 lb 1.3 oz)  Body mass index is 34.23 kg/m².    Intake/Output Summary (Last 24 hours) at 1/22/2023 2132  Last data filed at 1/22/2023 1731  Gross per 24 hour   Intake 826.23 ml   Output 900 ml   Net -73.77 ml        Physical Exam  Vitals reviewed.   Constitutional:       Appearance: Normal appearance.   HENT:      Head: Normocephalic and atraumatic.   Eyes:      General: No scleral icterus.     Conjunctiva/sclera: Conjunctivae normal.   Pulmonary:      Effort: Pulmonary effort is normal. No respiratory distress.      Comments: On 2L NC (baseline)  Skin:     Coloration: Skin is not jaundiced.      Findings: No erythema.    Psychiatric:         Mood and Affect: Mood normal.         Behavior: Behavior normal.       Significant Labs: All pertinent labs within the past 24 hours have been reviewed.    Significant Imaging: I have reviewed all pertinent imaging results/findings within the past 24 hours.      Assessment/Plan:      * C Diff Diarrhea complicated by Hypovolemia and Electrolyte abnormalities  Patient reports non-bloody diarrhea for 6 days, associated with loss of appetite, nausea, non-bloody vomiting, lethargy, chills.  Her last chemotherapy was on 1/6/2023.  Creatinine was 1.4 on admission with significant electrolyte abnormalities, including Calcium, Magnesium, Phos.  Uric Acid was 13.2. Patient with known Hyponatremia due to SIADH and Na near her baseline. No significant diarrhea while in the ED or since admission.  Recent stool studies on 12/25/2023 negative.  Given 1.5L NS in ED and electrolytes replaced.  Diarrhea had resolved for over 24 hours, but recurrent this morning x 2 episode (watery and cramping).  C Diff canceled given resolution of diarrhea, but reordered and positive Ag/PCR.  Started po Vanc today.    Plan:  Continue po Vanc 125mg q6  Continue special precautions and isolation  Continue to monitor fluid status and replace electrolytes as needed            Electrolyte abnormality  Hyponatremia, hypokalemia, hypomagnesemia, hypocalcemia, hypoalbuminemia  Nephrology consulted, appreciate input. Urine osm, Na, Cr, uric acid, serum urice acid, TSH, BNP checked.     Plan:  - PO KCl 20 mEq TID   - IV Mag sulfate 2g replacement   - Consult dietitian for hypoalbuminemia   - Cardiac diet with 1L fluid restriction  - Q4 BMP with mag and phos  - Cardiac monitoring  - Strict intake and output  - IV NaCl 75ml/hr     Advance care planning  Patient wants to continue full code, with chest compressions and mechanical ventilation.      Acute renal failure superimposed on stage 3 chronic kidney disease  Elevated Creatinine of 1.4  "on admission with a baseline of 1.0.  Likely due to hypovolemia from GI losses.  Appreciate Nephrology recommendations.  S/p IV fluids with improvement of creatinine back to normal - 0.7 today  -Monitor    Recurrent adenocarcinoma of left lung  Patient is currently on chemo (last on 1/6/23) and radiation.  Reports missing radiation the whole of last week      Syndrome of inappropriate secretion of antidiuretic hormone  Na 127 on admission and improved at 136 this morning  -Monitor      MDD (major depressive disorder), recurrent severe, without psychosis  Patient has persistent depression which is unknown and is currently controlled. Will Continue anti-depressant medications. We will not consult psychiatry at this time. Patient does not display psychosis at this time. Continue to monitor closely and adjust plan of care as needed.    - As above        Hypocalcemia  As above      Hyponatremia  As above      Acute on chronic respiratory failure with hypoxia and hypercapnia  Patient presented with increased SOB from her baseline.  She still smokes 1ppd and on home O2 at 3L NC.  Likely from COPD exacerbation. CXR on admission with findings suggest edema noting left pleural effusion.  Increased parenchymal attenuation projects over the right upper lung zone, developing consolidation is not excluded noting the appearance is similar to previous exams.  Labs on admission with Lactic Acid elevated at 2.3 and normalized.  .  Improved and stable.    Patient seen by Pulmonary and "The patient's working assessments include diarrhea, COPD, small L sided pleural effusion by POCUS.  Do not suspect the chronic effusion is operant in her current admit and do not advise tap.  Continue home COPD regimen".  Lung exam with much improved wheezing.    Plan:  Continue with O2 at tolerated to keep SpO2 >92%   Continue with Duonebs Q4 wake  Encourage smoking cessation  Strict intake and output      Debility  Patient mostly confined to " Wheelchair after previously using Rolator at home.  Has chronic pain from OA of Knees, chronic LBP and Left Shoulder pain. Deconditioned after her diarrheal illness as above.  She lives alone.    -Consulted PT - recommends home PT on discharge      Urinary tract infection without hematuria  Dysuria and suprapubic tenderness on admission.  UA with leukocytes +3 and nitrites and Urine culture pending.  Previous positive urine culture with E Coli in 2021, sensitive to Ceftriaxone and completed 3 days on 1/20/2023.      Chronic diastolic heart failure  TTE in 12/2022 with EF of 55% and Grade II left ventricular diastolic dysfunction.  .    -See above      Hypomagnesemia  As above      Generalized anxiety disorder  Hx of anxiety, MDD - Chronic. Controlled.  -Continue with Alprazolam 0.25mg PRN  -Continue home meds of Duloxetine 60mg daily, Trazodone 100mg qhs PRN      Tobacco dependence  Smokes 1ppd.  Patient denies need for nicotine patches. Wants to quit cold turkey.  Discussed dangers of smoking with use of home oxygen.  -Continue to encourage smoking cessation      Essential hypertension  Hypotensive from GI losses and sepsis secondary to UTI   -Continue with Metoprolol XL 50mg  -Continue to hold home dose losartan 100mg PO daily      Chronic obstructive pulmonary disease  As above        VTE Risk Mitigation (From admission, onward)         Ordered     enoxaparin injection 40 mg  Daily         01/17/23 2236     IP VTE HIGH RISK PATIENT  Once         01/17/23 2236     Place sequential compression device  Until discontinued         01/17/23 2236                      I have completed this tele-visit with the assistance of a telepresenter.    The attending portion of this evaluation, treatment, and documentation was performed per Elena Dahl MD via Telemedicine AudioVisual using the secure [employer] software platform with 2 way audio/video. The provider was located off-site and the patient is located in the  [facility]. The aforementioned video software was utilized to document the relevant history and physical exam    Elena Dahl MD  Department of Hospital Medicine   [facility] ([place])

## 2023-01-23 NOTE — PROGRESS NOTES
RSW met with patient and patient has no additional needs and declines any resources needed.     DENILSON Reyes

## 2023-01-23 NOTE — PLAN OF CARE
Pt remained free of falls and injuries throughout shift. AAOx4. Pt calm and cooperative. Purposeful hourly rounding performed. Pt swallows meds whole. IV Rocephin infusing at this time. Patient chairfast, weight shift assist provided q2h. Pure wick in place to suction, no BM this shift. VSS on 2L O2 NC. Pt resting comfortably in bed, denies needs at this time, NADN. Bed low and locked, bed alarm on, call light in reach. Side rails up x3. Will continue to monitor.

## 2023-01-23 NOTE — PLAN OF CARE
POC reviewed with patient. AAOx4. Stable on 2 lit NC.No other complaints verbalized during shift. Received IV antibiotics according to MAR.Purewick utilized for voiding. Positions self x 1 assist. Turn Q2/frequent weight shift encouraged by RN. Low air loss mattress in use. No injuries, falls, or trauma occurred during shift. Purposeful rounding completed. Bed low and locked with side rails up x 3 and call light within reach.       Problem: Adult Inpatient Plan of Care  Goal: Plan of Care Review  Outcome: Ongoing, Progressing  Goal: Patient-Specific Goal (Individualized)  Outcome: Ongoing, Progressing  Goal: Absence of Hospital-Acquired Illness or Injury  Outcome: Ongoing, Progressing  Goal: Optimal Comfort and Wellbeing  Outcome: Ongoing, Progressing  Goal: Readiness for Transition of Care  Outcome: Ongoing, Progressing

## 2023-01-23 NOTE — SUBJECTIVE & OBJECTIVE
Interval History: Patient reports diarrhea is improving. Labs significant for hyperkalemia - given lokelma. Breathing stable on 2L NC.     Review of Systems   Constitutional:  Positive for appetite change (improved) and fatigue (improving). Negative for chills and fever.   HENT:  Negative for congestion, ear pain and sore throat.    Eyes:  Negative for pain and redness.   Respiratory:  Positive for shortness of breath (stable on 3L O2NC at home). Negative for cough.    Cardiovascular:  Negative for chest pain and leg swelling.   Gastrointestinal:  Positive for diarrhea (recurrent). Negative for nausea and vomiting.   Genitourinary:  Negative for dysuria and flank pain.   Musculoskeletal:  Positive for arthralgias (chronic). Negative for myalgias and neck pain.   Skin:  Negative for rash and wound.   Neurological:  Negative for weakness, numbness and headaches.   Psychiatric/Behavioral:  Negative for agitation, behavioral problems and confusion. The patient is not nervous/anxious.    Objective:     Vital Signs (Most Recent):  Temp: 97.8 °F (36.6 °C) (01/22/23 2111)  Pulse: 92 (01/22/23 2111)  Resp: 16 (01/22/23 2111)  BP: 139/65 (01/22/23 2111)  SpO2: 98 % (01/22/23 2111)   Vital Signs (24h Range):  Temp:  [97.5 °F (36.4 °C)-98 °F (36.7 °C)] 97.8 °F (36.6 °C)  Pulse:  [] 92  Resp:  [16-20] 16  SpO2:  [95 %-98 %] 98 %  BP: (139-177)/(65-83) 139/65     Weight: 96.2 kg (212 lb 1.3 oz)  Body mass index is 34.23 kg/m².    Intake/Output Summary (Last 24 hours) at 1/22/2023 2132  Last data filed at 1/22/2023 1731  Gross per 24 hour   Intake 826.23 ml   Output 900 ml   Net -73.77 ml        Physical Exam  Vitals reviewed.   Constitutional:       Appearance: Normal appearance.   HENT:      Head: Normocephalic and atraumatic.   Eyes:      General: No scleral icterus.     Conjunctiva/sclera: Conjunctivae normal.   Pulmonary:      Effort: Pulmonary effort is normal. No respiratory distress.      Comments: On 2L NC  (baseline)  Skin:     Coloration: Skin is not jaundiced.      Findings: No erythema.   Psychiatric:         Mood and Affect: Mood normal.         Behavior: Behavior normal.       Significant Labs: All pertinent labs within the past 24 hours have been reviewed.    Significant Imaging: I have reviewed all pertinent imaging results/findings within the past 24 hours.

## 2023-01-24 ENCOUNTER — PATIENT OUTREACH (OUTPATIENT)
Dept: ADMINISTRATIVE | Facility: OTHER | Age: 73
End: 2023-01-24
Payer: MEDICARE

## 2023-01-24 ENCOUNTER — TELEPHONE (OUTPATIENT)
Dept: RADIATION ONCOLOGY | Facility: CLINIC | Age: 73
End: 2023-01-24
Payer: MEDICARE

## 2023-01-24 VITALS
RESPIRATION RATE: 20 BRPM | OXYGEN SATURATION: 95 % | TEMPERATURE: 98 F | WEIGHT: 212.06 LBS | HEIGHT: 66 IN | DIASTOLIC BLOOD PRESSURE: 99 MMHG | SYSTOLIC BLOOD PRESSURE: 178 MMHG | BODY MASS INDEX: 34.08 KG/M2 | HEART RATE: 96 BPM

## 2023-01-24 LAB
ALBUMIN SERPL BCP-MCNC: 1.8 G/DL (ref 3.5–5.2)
ANION GAP SERPL CALC-SCNC: 4 MMOL/L (ref 8–16)
BUN SERPL-MCNC: 9 MG/DL (ref 8–23)
CALCIUM SERPL-MCNC: 8.6 MG/DL (ref 8.7–10.5)
CHLORIDE SERPL-SCNC: 106 MMOL/L (ref 95–110)
CO2 SERPL-SCNC: 24 MMOL/L (ref 23–29)
CREAT SERPL-MCNC: 0.7 MG/DL (ref 0.5–1.4)
EST. GFR  (NO RACE VARIABLE): >60 ML/MIN/1.73 M^2
GLUCOSE SERPL-MCNC: 90 MG/DL (ref 70–110)
PHOSPHATE SERPL-MCNC: 4.7 MG/DL (ref 2.7–4.5)
POTASSIUM SERPL-SCNC: 5.1 MMOL/L (ref 3.5–5.1)
SODIUM SERPL-SCNC: 134 MMOL/L (ref 136–145)

## 2023-01-24 PROCEDURE — 25000242 PHARM REV CODE 250 ALT 637 W/ HCPCS

## 2023-01-24 PROCEDURE — 80069 RENAL FUNCTION PANEL: CPT | Performed by: STUDENT IN AN ORGANIZED HEALTH CARE EDUCATION/TRAINING PROGRAM

## 2023-01-24 PROCEDURE — 94640 AIRWAY INHALATION TREATMENT: CPT

## 2023-01-24 PROCEDURE — 36415 COLL VENOUS BLD VENIPUNCTURE: CPT | Performed by: STUDENT IN AN ORGANIZED HEALTH CARE EDUCATION/TRAINING PROGRAM

## 2023-01-24 PROCEDURE — 99239 PR HOSPITAL DISCHARGE DAY,>30 MIN: ICD-10-PCS | Mod: 95,,, | Performed by: STUDENT IN AN ORGANIZED HEALTH CARE EDUCATION/TRAINING PROGRAM

## 2023-01-24 PROCEDURE — 27000221 HC OXYGEN, UP TO 24 HOURS

## 2023-01-24 PROCEDURE — 99900035 HC TECH TIME PER 15 MIN (STAT)

## 2023-01-24 PROCEDURE — 63600175 PHARM REV CODE 636 W HCPCS: Performed by: INTERNAL MEDICINE

## 2023-01-24 PROCEDURE — 25000003 PHARM REV CODE 250

## 2023-01-24 PROCEDURE — 25000242 PHARM REV CODE 250 ALT 637 W/ HCPCS: Performed by: INTERNAL MEDICINE

## 2023-01-24 PROCEDURE — 25000003 PHARM REV CODE 250: Performed by: STUDENT IN AN ORGANIZED HEALTH CARE EDUCATION/TRAINING PROGRAM

## 2023-01-24 PROCEDURE — 25000003 PHARM REV CODE 250: Performed by: INTERNAL MEDICINE

## 2023-01-24 PROCEDURE — 94761 N-INVAS EAR/PLS OXIMETRY MLT: CPT

## 2023-01-24 PROCEDURE — 99239 HOSP IP/OBS DSCHRG MGMT >30: CPT | Mod: 95,,, | Performed by: STUDENT IN AN ORGANIZED HEALTH CARE EDUCATION/TRAINING PROGRAM

## 2023-01-24 RX ORDER — LOSARTAN POTASSIUM 100 MG/1
100 TABLET ORAL DAILY
Qty: 90 TABLET | Refills: 3
Start: 2023-01-24 | End: 2023-03-09

## 2023-01-24 RX ADMIN — PANTOPRAZOLE SODIUM 40 MG: 40 TABLET, DELAYED RELEASE ORAL at 09:01

## 2023-01-24 RX ADMIN — IPRATROPIUM BROMIDE AND ALBUTEROL SULFATE 3 ML: 2.5; .5 SOLUTION RESPIRATORY (INHALATION) at 08:01

## 2023-01-24 RX ADMIN — Medication 1000 UNITS: at 09:01

## 2023-01-24 RX ADMIN — SODIUM ZIRCONIUM CYCLOSILICATE 10 G: 10 POWDER, FOR SUSPENSION ORAL at 09:01

## 2023-01-24 RX ADMIN — FLUTICASONE FUROATE AND VILANTEROL TRIFENATATE 1 PUFF: 100; 25 POWDER RESPIRATORY (INHALATION) at 08:01

## 2023-01-24 RX ADMIN — DULOXETINE 60 MG: 30 CAPSULE, DELAYED RELEASE ORAL at 09:01

## 2023-01-24 RX ADMIN — GUAIFENESIN 1200 MG: 600 TABLET, EXTENDED RELEASE ORAL at 09:01

## 2023-01-24 RX ADMIN — VANCOMYCIN HYDROCHLORIDE 125 MG: KIT at 11:01

## 2023-01-24 RX ADMIN — CEFTRIAXONE 1 G: 1 INJECTION, SOLUTION INTRAVENOUS at 05:01

## 2023-01-24 RX ADMIN — ATORVASTATIN CALCIUM 40 MG: 20 TABLET, FILM COATED ORAL at 09:01

## 2023-01-24 RX ADMIN — Medication 250 MG: at 09:01

## 2023-01-24 RX ADMIN — HYDROCODONE BITARTRATE AND ACETAMINOPHEN 1 TABLET: 10; 325 TABLET ORAL at 09:01

## 2023-01-24 RX ADMIN — GABAPENTIN 300 MG: 300 CAPSULE ORAL at 09:01

## 2023-01-24 RX ADMIN — METOPROLOL SUCCINATE 50 MG: 50 TABLET, EXTENDED RELEASE ORAL at 09:01

## 2023-01-24 RX ADMIN — IPRATROPIUM BROMIDE AND ALBUTEROL SULFATE 3 ML: 2.5; .5 SOLUTION RESPIRATORY (INHALATION) at 12:01

## 2023-01-24 RX ADMIN — AZELASTINE HYDROCHLORIDE 137 MCG: 137 SPRAY, METERED NASAL at 09:01

## 2023-01-24 RX ADMIN — COLLAGENASE SANTYL: 250 OINTMENT TOPICAL at 09:01

## 2023-01-24 RX ADMIN — VANCOMYCIN HYDROCHLORIDE 125 MG: KIT at 12:01

## 2023-01-24 RX ADMIN — FLUTICASONE PROPIONATE 50 MCG: 50 SPRAY, METERED NASAL at 09:01

## 2023-01-24 RX ADMIN — VANCOMYCIN HYDROCHLORIDE 125 MG: KIT at 05:01

## 2023-01-24 NOTE — PLAN OF CARE
Patient educated on discharge instructions and verbalized understanding.  All questions answered to patient's satisfaction. IV removed without complication.  Patient to be transported off unit via wheelchair.       Problem: Adult Inpatient Plan of Care  Goal: Plan of Care Review  Outcome: Met  Goal: Patient-Specific Goal (Individualized)  Outcome: Met  Goal: Absence of Hospital-Acquired Illness or Injury  Outcome: Met  Goal: Optimal Comfort and Wellbeing  Outcome: Met  Goal: Readiness for Transition of Care  Outcome: Met     Problem: Fall Injury Risk  Goal: Absence of Fall and Fall-Related Injury  Outcome: Met     Problem: Fluid and Electrolyte Imbalance (Acute Kidney Injury/Impairment)  Goal: Fluid and Electrolyte Balance  Outcome: Met     Problem: Oral Intake Inadequate (Acute Kidney Injury/Impairment)  Goal: Optimal Nutrition Intake  Outcome: Met     Problem: Renal Function Impairment (Acute Kidney Injury/Impairment)  Goal: Effective Renal Function  Outcome: Met

## 2023-01-24 NOTE — CARE UPDATE
01/23/23 2055   Patient Assessment/Suction   Level of Consciousness (AVPU) alert   Respiratory Effort Short of breath   Expansion/Accessory Muscles/Retractions no use of accessory muscles;no retractions;expansion symmetric   All Lung Fields Breath Sounds wheezes, expiratory   Rhythm/Pattern, Respiratory shortness of breath   Cough Frequency infrequent   Skin Integrity   $ Wound Care Tech Time 15 min   Area Observed Left;Right;Behind ear;Cheek   Skin Appearance without discoloration   PRE-TX-O2   Device (Oxygen Therapy) nasal cannula with humidification   $ Is the patient on Low Flow Oxygen? Yes   Flow (L/min) 3   Oxygen Concentration (%) 32   SpO2 98 %   Pulse Oximetry Type Intermittent   $ Pulse Oximetry - Multiple Charge Pulse Oximetry - Multiple   Pulse 97   Resp 20   Aerosol Therapy   $ Aerosol Therapy Charges Aerosol Treatment   Respiratory Treatment Status (SVN) given   Treatment Route (SVN) mask   Patient Position (SVN) semi-Obrien's   Post Treatment Assessment (SVN) breath sounds unchanged   Signs of Intolerance (SVN) none   Breath Sounds Post-Respiratory Treatment   Throughout All Fields Post-Treatment All Fields   Throughout All Fields Post-Treatment aeration increased   Post-treatment Heart Rate (beats/min) 97   Post-treatment Resp Rate (breaths/min) 20   Ready to Wean/Extubation Screen   FIO2<=50 (chart decimal) 0.32

## 2023-01-24 NOTE — PLAN OF CARE
Recommendations  Add Boost Breeze once a day if PO < 25%  Encourage good PO intake at meals  Monitor strict I&Os/weights  Monitor nutrition related labs  - replace electrolytes as needed    Goals:   Patient to meet >/=75% EEN/EPN by RD follow up  Monitored labs to trend toward target ranges    Nutrition Goal Status: goal met  Communication of RD Recs:  (POC)

## 2023-01-24 NOTE — PLAN OF CARE
Patient has voiced a desire to transfer home via wheelchair van - has personal wheelchair & portable oxygen at bedside - left message for Nichole to cancel request for ambulance auth & follow up on home health request - [id number] completed for transport via wheelchair van with requested  time 3pm - RN aware        01/24/23 1419   Final Note   Assessment Type Final Discharge Note   Anticipated Discharge Disposition Home-Health   What phone number can be called within the next 1-3 days to see how you are doing after discharge? [phone]   Hospital Resources/Appts/Education Provided Provided patient/caregiver with written discharge plan information   Post-Acute Status   Post-Acute Authorization Home Health;HME   HME Status (!) Pending Delivery   Home Health Status Pending Payor Review   Patient choice form signed by patient/caregiver List from System Post-Acute Care   Discharge Delays (!) PFC Arranged Transportation

## 2023-01-24 NOTE — PROGRESS NOTES
[facility] Medicine  Telemedicine Progress Note    Patient Name: Nalini Varma  MRN: [id number]  Patient Class: IP- Inpatient   Admission Date: 1/17/2023  Length of Stay: 6 days  Attending Physician: Elena Dahl MD  Primary Care Provider: Yane Sahni MD          Subjective:     Principal Problem:Hypovolemia dehydration        HPI:  Nalini Varma is a 72 year old female with PMHx of COPD (on home O2 3L) with 53 pack year smoking hx, current smoker, lung CA (currently on radiation and chemo) chronic anemia, anxiety, CKD Stage 3, systolic and diastolic heart failure (EF 55%, Grade II diastolic dysfunction, 12/24/22) presenting to ER for hypotension and increased shortness of breath for 6 days. Patient reports non-bloody diarrhea for 6 days, associated with loss of appetite, nausea, non-bloody vomiting, lethargy, chills. Patient also reports increased SOB but denies purulent sputum, sore throat or fever. Patient denies orthopnea or bilateral lower extremity swelling. Patient also reports dysuria and suprapubic tenderness.     H/H 8.4/25.4 elevated from baseline 7.7/23.3, likely due to hypovolemia. No leukocytosis but elevated lactate 2.3. UA with nitrites and +3 leukocytes. Hyponatremia 127 (baseline 133), hypocalcemia 8.2 (corrected for hypoalbuminemia 1.2). Cr elevated 1.4 from baseline 1.0, and an elevated AG 18. CXR with findings of  edema noting left pleural effusion. BNP elevated at 139. Patient was given IV calcium gluconate 1g, IV Mag sulfate 2g and IV NaCl 0.9% 1.5L in ER. Nephrology and Pulmonology was consulted. Patient was started on IV Ceftriaxone 1g for symptomatic UTI.    Patient is admitted to inpatient service with Hospital Medicine for management of electrolyte abnormalities, left pleural effusion and symptomatic UTI.       Overview/Hospital Course:  See below      Interval History: Patient reports diarrhea is improving. Labs significant for  hyperkalemia - given lokelma. Breathing stable on 2L NC. Hopeful d/c for home tomorrow.     Review of Systems   Constitutional:  Positive for appetite change (improved) and fatigue (improving). Negative for chills and fever.   HENT:  Negative for congestion, ear pain and sore throat.    Eyes:  Negative for pain and redness.   Respiratory:  Positive for shortness of breath (stable on 3L O2NC at home). Negative for cough.    Cardiovascular:  Negative for chest pain and leg swelling.   Gastrointestinal:  Positive for diarrhea (recurrent). Negative for nausea and vomiting.   Genitourinary:  Negative for dysuria and flank pain.   Musculoskeletal:  Positive for arthralgias (chronic). Negative for myalgias and neck pain.   Skin:  Negative for rash and wound.   Neurological:  Negative for weakness, numbness and headaches.   Psychiatric/Behavioral:  Negative for agitation, behavioral problems and confusion. The patient is not nervous/anxious.    Objective:     Vital Signs (Most Recent):  Temp: 97.9 °F (36.6 °C) (01/23/23 1944)  Pulse: 105 (01/23/23 2202)  Resp: 20 (01/23/23 2055)  BP: (!) 163/89 (01/23/23 1944)  SpO2: 98 % (01/23/23 2055)   Vital Signs (24h Range):  Temp:  [97.5 °F (36.4 °C)-98.6 °F (37 °C)] 97.9 °F (36.6 °C)  Pulse:  [] 105  Resp:  [14-24] 20  SpO2:  [95 %-100 %] 98 %  BP: (140-175)/(65-89) 163/89     Weight: 96.2 kg (212 lb 1.3 oz)  Body mass index is 34.23 kg/m².    Intake/Output Summary (Last 24 hours) at 1/23/2023 2300  Last data filed at 1/23/2023 1511  Gross per 24 hour   Intake 680 ml   Output 1550 ml   Net -870 ml        Physical Exam  Vitals reviewed.   Constitutional:       Appearance: Normal appearance.   HENT:      Head: Normocephalic and atraumatic.   Eyes:      General: No scleral icterus.     Conjunctiva/sclera: Conjunctivae normal.   Pulmonary:      Effort: Pulmonary effort is normal. No respiratory distress.      Comments: On 2L NC (baseline)  Skin:     Coloration: Skin is not  jaundiced.      Findings: No erythema.   Psychiatric:         Mood and Affect: Mood normal.         Behavior: Behavior normal.       Significant Labs: All pertinent labs within the past 24 hours have been reviewed.    Significant Imaging: I have reviewed all pertinent imaging results/findings within the past 24 hours.      Assessment/Plan:      * C Diff Diarrhea complicated by Hypovolemia and Electrolyte abnormalities  Patient reports non-bloody diarrhea for 6 days, associated with loss of appetite, nausea, non-bloody vomiting, lethargy, chills.  Her last chemotherapy was on 1/6/2023.  Creatinine was 1.4 on admission with significant electrolyte abnormalities, including Calcium, Magnesium, Phos.  Uric Acid was 13.2. Patient with known Hyponatremia due to SIADH and Na near her baseline. No significant diarrhea while in the ED or since admission.  Recent stool studies on 12/25/2023 negative.  Given 1.5L NS in ED and electrolytes replaced.  Diarrhea had resolved for over 24 hours, but recurrent this morning x 2 episode (watery and cramping).  C Diff canceled given resolution of diarrhea, but reordered and positive Ag/PCR.  Started po Vanc today.    Plan:  Continue po Vanc 125mg q6  Continue special precautions and isolation  Continue to monitor fluid status and replace electrolytes as needed            Electrolyte abnormality  Hyponatremia, hypokalemia, hypomagnesemia, hypocalcemia, hypoalbuminemia  Nephrology consulted, appreciate input. Urine osm, Na, Cr, uric acid, serum urice acid, TSH, BNP checked.     Plan:  - PO KCl 20 mEq TID   - IV Mag sulfate 2g replacement   - Consult dietitian for hypoalbuminemia   - Cardiac diet with 1L fluid restriction  - Q4 BMP with mag and phos  - Cardiac monitoring  - Strict intake and output  - IV NaCl 75ml/hr     Advance care planning  Patient wants to continue full code, with chest compressions and mechanical ventilation.      Acute renal failure superimposed on stage 3 chronic  "kidney disease  Elevated Creatinine of 1.4 on admission with a baseline of 1.0.  Likely due to hypovolemia from GI losses.  Appreciate Nephrology recommendations.  S/p IV fluids with improvement of creatinine back to normal - 0.7 today  -Monitor    Recurrent adenocarcinoma of left lung  Patient is currently on chemo (last on 1/6/23) and radiation.  Reports missing radiation the whole of last week      Syndrome of inappropriate secretion of antidiuretic hormone  Na 127 on admission and improved at 136 this morning  -Monitor      MDD (major depressive disorder), recurrent severe, without psychosis  Patient has persistent depression which is unknown and is currently controlled. Will Continue anti-depressant medications. We will not consult psychiatry at this time. Patient does not display psychosis at this time. Continue to monitor closely and adjust plan of care as needed.    - As above        Hypocalcemia  As above      Hyponatremia  As above      Acute on chronic respiratory failure with hypoxia and hypercapnia  Patient presented with increased SOB from her baseline.  She still smokes 1ppd and on home O2 at 3L NC.  Likely from COPD exacerbation. CXR on admission with findings suggest edema noting left pleural effusion.  Increased parenchymal attenuation projects over the right upper lung zone, developing consolidation is not excluded noting the appearance is similar to previous exams.  Labs on admission with Lactic Acid elevated at 2.3 and normalized.  .  Improved and stable.    Patient seen by Pulmonary and "The patient's working assessments include diarrhea, COPD, small L sided pleural effusion by POCUS.  Do not suspect the chronic effusion is operant in her current admit and do not advise tap.  Continue home COPD regimen".  Lung exam with much improved wheezing.    Plan:  Continue with O2 at tolerated to keep SpO2 >92%   Continue with Duonebs Q4 wake  Encourage smoking cessation  Strict intake and " output      Debility  Patient mostly confined to Wheelchair after previously using Rolator at home.  Has chronic pain from OA of Knees, chronic LBP and Left Shoulder pain. Deconditioned after her diarrheal illness as above.  She lives alone.    -Consulted PT - recommends home PT on discharge      Urinary tract infection without hematuria  Dysuria and suprapubic tenderness on admission.  UA with leukocytes +3 and nitrites and Urine culture pending.  Previous positive urine culture with E Coli in 2021, sensitive to Ceftriaxone and completed 3 days on 1/20/2023.      Chronic diastolic heart failure  TTE in 12/2022 with EF of 55% and Grade II left ventricular diastolic dysfunction.  .    -See above      Hyperkalemia  - starting lokelma      Hypomagnesemia  As above      Generalized anxiety disorder  Hx of anxiety, MDD - Chronic. Controlled.  -Continue with Alprazolam 0.25mg PRN  -Continue home meds of Duloxetine 60mg daily, Trazodone 100mg qhs PRN      Tobacco dependence  Smokes 1ppd.  Patient denies need for nicotine patches. Wants to quit cold turkey.  Discussed dangers of smoking with use of home oxygen.  -Continue to encourage smoking cessation      Essential hypertension  Hypotensive from GI losses and sepsis secondary to UTI   -Continue with Metoprolol XL 50mg  -Continue to hold home dose losartan 100mg PO daily      Chronic obstructive pulmonary disease  As above        VTE Risk Mitigation (From admission, onward)         Ordered     enoxaparin injection 40 mg  Daily         01/17/23 2236     IP VTE HIGH RISK PATIENT  Once         01/17/23 2236     Place sequential compression device  Until discontinued         01/17/23 2236                      I have completed this tele-visit with the assistance of a telepresenter.    The attending portion of this evaluation, treatment, and documentation was performed per Elena Dahl MD via Telemedicine AudioVisual using the secure Vidyo software platform with 2  way audio/video. The provider was located off-site and the patient is located in the hospital. The aforementioned video software was utilized to document the relevant history and physical exam    Elena Dahl MD  Department of Hospital Medicine   [facility] ([place])

## 2023-01-24 NOTE — PLAN OF CARE
During my shift, pt AAOx4, NAD. VS stable. Pt remains afebrile. SPO2 wnl on 2L of humidified O2 via NC. Pt reports back and RUE pain that has been managed with Norco. Purewick in place collecting clear yellow urine. IV and oral abx therapy in progress. No adverse reaction noted. Pt turned q2 hrs. Pt remains free from falls or injury. Bed is lowered and locked. Call light is within reach. Pt has no known needs at this time.

## 2023-01-24 NOTE — PROGRESS NOTES
IP Liaison - Final Visit Note    Patient: Nalini Varma  MRN:  [id number]  Date of Service:  1/24/2023  Completed by:  DENILSON Reyes    Reason for Visit   Patient presents with    IP Liaison Follow-up Visit            Patient Summary     Discharge Date: 1/24/2023   Discharge telephone number/address: [phone] [address]  Follow up provider: Yane Sahni MD  Follow up appointments: 1/25/2023, 1:30pm  Home Health agency & telephone number: agency not listed in chart  DME ordered &  name: n/a  Assigned OPCM RN/SW: n/a  Report sent to follow up team (PCP/OPCM) via in basket message: n/a  Community Resources arranged including agency name & contact info: n/a    DENILSON Reyes

## 2023-01-24 NOTE — PLAN OF CARE
[facility] ([place])      HOME HEALTH ORDERS  FACE TO FACE ENCOUNTER    Patient Name: Nalini Varma  YOB: 1950    PCP: Yane Sahni MD   PCP Address: [address] [place], [place] [address] [facility] [address]  PCP Phone Number: [phone]  PCP Fax: [phone]    Encounter Date: 1/17/23    Admit to Home Health    Diagnoses:  Active Hospital Problems    Diagnosis  POA    *C Diff Diarrhea complicated by Hypovolemia and Electrolyte abnormalities [E86.1]  Yes    Acute renal failure superimposed on stage 3 chronic kidney disease [N17.9, N18.30]  Yes    Advance care planning [Z71.89]  Not Applicable    Recurrent adenocarcinoma of left lung [C34.92]  Yes    Syndrome of inappropriate secretion of antidiuretic hormone [E22.2]  Yes    Acute on chronic respiratory failure with hypoxia and hypercapnia [J96.21, J96.22]  Yes    Debility [R53.81]  Yes    Urinary tract infection without hematuria [N39.0]  Yes    Chronic diastolic heart failure [I50.32]  Yes    Hyperkalemia [E87.5]  No    Generalized anxiety disorder [F41.1]  Yes    Chronic obstructive pulmonary disease [J44.9]  Yes    Essential hypertension [I10]  Yes    Tobacco dependence [F17.200]  Yes      Resolved Hospital Problems   No resolved problems to display.       Follow Up Appointments:  Future Appointments   Date Time Provider Department Center   1/25/2023  1:30 PM Yane Sahni MD [facility] IM [facility]   2/13/2023 10:30 AM LAB, SAME DAY [facility]   2/13/2023 11:30 AM Ann Gilmore MD [facility] HEM ONC [facility]       Allergies:  Review of patient's allergies indicates:   Allergen Reactions    Wellbutrin [bupropion hcl] Other (See Comments)     Hyponatremia and hypomagnesia     Zoloft [sertraline] Other (See Comments)     Hyponatremia and hypomagnesia     Bananas [banana]      Emesis, and stomach cramps    Patient states she is not allergic to Banana       Medications: Review discharge medications with patient and family and  provide education.    Current Facility-Administered Medications   Medication Dose Route Frequency Provider Last Rate Last Admin    acetaminophen tablet 1,000 mg  1,000 mg Oral Q6H PRN Radhika Sunshine, NP   1,000 mg at 01/18/23 1444    albuterol-ipratropium 2.5 mg-0.5 mg/3 mL nebulizer solution 3 mL  3 mL Nebulization Q4H WAKE Shluischapincito Sunshine, NP   3 mL at 01/24/23 0815    ALPRAZolam tablet 0.25 mg  0.25 mg Oral Daily PRN Harleycarlos Sunshine, NP   0.25 mg at 01/22/23 0846    aluminum-magnesium hydroxide-simethicone 200-200-20 mg/5 mL suspension 30 mL  30 mL Oral QID PRN Radhika Sunshine, NP        ascorbic acid (vitamin C) tablet 250 mg  250 mg Oral Daily Kvngchapincito Ansarie, NP   250 mg at 01/24/23 0900    atorvastatin tablet 40 mg  40 mg Oral Daily Kvngchapincito Sunshine, NP   40 mg at 01/24/23 0906    azelastine 137 mcg (0.1 %) nasal spray 137 mcg  1 spray Nasal BID Radhika Sunshine NP   137 mcg at 01/24/23 0912    cefTRIAXone (ROCEPHIN) 1 g/50 mL D5W IVPB  1 g Intravenous Q24H Paras Antonio MD   Stopped at 01/24/23 0604    collagenase ointment   Topical (Top) Daily Paras Antonio MD   Given at 01/24/23 0912    DULoxetine DR capsule 60 mg  60 mg Oral Daily Radhika Sunshine NP   60 mg at 01/24/23 0907    enoxaparin injection 40 mg  40 mg Subcutaneous Daily Radhika Sunshine NP   40 mg at 01/23/23 1706    fluticasone furoate-vilanteroL 100-25 mcg/dose diskus inhaler 1 puff  1 puff Inhalation Daily Paras Antonio MD   1 puff at 01/24/23 0818    fluticasone propionate 50 mcg/actuation nasal spray 50 mcg  1 spray Each Nostril Daily Paras Antonio MD   50 mcg at 01/24/23 0912    gabapentin capsule 300 mg  300 mg Oral TID Radhika Sunshine NP   300 mg at 01/24/23 0907    guaiFENesin 12 hr tablet 1,200 mg  1,200 mg Oral BID Paras Antonio MD   1,200 mg at 01/24/23 0906    hydrALAZINE tablet 25 mg  25 mg Oral TID PRN Elena Dahl MD   25 mg at 01/22/23 1743    HYDROcodone-acetaminophen  mg per tablet 1 tablet  1 tablet Oral Q12H PRN Radhika  Jong, NP   1 tablet at 01/24/23 0908    metoprolol succinate (TOPROL-XL) 24 hr tablet 50 mg  50 mg Oral Daily Radhika Sunshine NP   50 mg at 01/24/23 0907    ondansetron injection 4 mg  4 mg Intravenous Q8H PRN Radhika Sunshine, NP        pantoprazole EC tablet 40 mg  40 mg Oral Daily Radhika Sunshine NP   40 mg at 01/24/23 0907    prochlorperazine injection Soln 5 mg  5 mg Intravenous Q6H PRN Radhika Sunshine, NP        simethicone chewable tablet 80 mg  1 tablet Oral QID PRN Radhika Sunshine, NP   80 mg at 01/21/23 0558    sodium chloride 0.9% flush 10 mL  10 mL Intravenous Q12H PRN Radhika Jong, NP        sodium zirconium cyclosilicate packet 10 g  10 g Oral TID Elena Dahl MD   10 g at 01/24/23 0907    traZODone tablet 100 mg  100 mg Oral QHS Radhika Sunshine NP   100 mg at 01/23/23 2051    vancomycin 125 mg/5 mL oral solution 125 mg  125 mg Oral Q6H Paras Antonio MD   125 mg at 01/24/23 1147    vitamin D 1000 units tablet 1,000 Units  1,000 Units Oral Daily Radhika Sunshine, NP   1,000 Units at 01/24/23 0907     Current Discharge Medication List        START taking these medications    Details   sodium zirconium cyclosilicate (LOKELMA) 10 gram packet Take 1 packet (10 g total) by mouth once daily. Mix entire contents of packet(s) into drinking glass containing 3 tablespoons of water; stir well and drink immediately. Add water and repeat until no powder remains to receive entire dose. for 5 days  Qty: 5 packet, Refills: 0      vancomycin 125 mg/5 mL Soln Take 5 mLs (125 mg total) by mouth every 6 (six) hours. for 7 days  Qty: 140 mL, Refills: 0           CONTINUE these medications which have CHANGED    Details   losartan (COZAAR) 100 MG tablet Take 1 tablet (100 mg total) by mouth once daily. HOLD UNTIL YOU SEE YOUR PCP  Qty: 90 tablet, Refills: 3    Comments: .  Associated Diagnoses: Hypertension, benign           CONTINUE these medications which have NOT CHANGED    Details   albuterol (VENTOLIN HFA) 90 mcg/actuation  inhaler inhale 1-2 puffs as needed every 6 hours for wheezing and shortness of breath  Qty: 25.5 g, Refills: 11    Associated Diagnoses: Chronic respiratory failure with hypoxia      ALPRAZolam (XANAX) 0.25 MG tablet Take 1 tablet (0.25 mg total) by mouth daily as needed for Anxiety.  Qty: 30 tablet, Refills: 3      ascorbic acid, vitamin C, (VITAMIN C) 250 MG tablet Take 250 mg by mouth once daily.      atorvastatin (LIPITOR) 40 MG tablet TAKE 1 TABLET BY MOUTH EVERY DAILY  Qty: 90 tablet, Refills: 3    Associated Diagnoses: Hyperlipidemia, unspecified hyperlipidemia type      azelastine (ASTELIN) 137 mcg (0.1 %) nasal spray Instill 1 spray (137 mcg total) by Nasal route 2 (two) times daily.  Qty: 30 mL, Refills: 3    Associated Diagnoses: Seasonal allergic rhinitis due to pollen      b complex vitamins capsule Take 1 capsule by mouth once daily.      biotin 10 mg Tab Take 10 mg by mouth once daily.      DULoxetine (CYMBALTA) 60 MG capsule Take 1 capsule (60 mg total) by mouth once daily.  Qty: 90 capsule, Refills: 2      fluticasone (FLONASE) 50 mcg/actuation nasal spray 1 spray by Each Nare route 2 (two) times daily as needed for Rhinitis.      fluticasone propion-salmeterol 115-21 mcg/dose (ADVAIR HFA) 115-21 mcg/actuation HFAA inhaler Inhale 2 puffs into the lungs every 12 (twelve) hours.  Qty: 36 g, Refills: 3    Associated Diagnoses: Chronic bronchitis, unspecified chronic bronchitis type      gabapentin (NEURONTIN) 300 MG capsule Take 1 capsule (300 mg total) by mouth 3 (three) times daily.  Qty: 90 capsule, Refills: 11      guaiFENesin (MUCINEX) 600 mg 12 hr tablet Take 1,200 mg by mouth 2 (two) times daily as needed for Congestion.      HYDROcodone-acetaminophen (NORCO)  mg per tablet Take 1 tablet by mouth every 12 (twelve) hours as needed for Pain.  Qty: 60 tablet, Refills: 0    Comments: Quantity prescribed more than 7 day supply? Yes, quantity medically necessary  Associated Diagnoses: Chronic  pain syndrome; Primary osteoarthritis of both knees      lutein 40 mg Cap Take by mouth.      magnesium oxide (MAG-OX) 400 mg (241.3 mg magnesium) tablet Take 1 tablet by mouth every 12 (twelve) hours.  Qty: 30 tablet, Refills: 0      metoprolol succinate (TOPROL-XL) 25 MG 24 hr tablet Take 2 tablets (50 mg total) by mouth once daily.  Qty: 60 tablet, Refills: 11    Comments: .ok to change to 25mg 2poQD per MD -renetta  Associated Diagnoses: Hypertension, benign      multivit-min/iron/folic/lutein (CENTRUM SILVER WOMEN ORAL) Take 1 tablet by mouth once daily.      pantoprazole (PROTONIX) 40 MG tablet Take 1 tablet (40 mg total) by mouth once daily.  Qty: 90 tablet, Refills: 3    Associated Diagnoses: Diverticulosis      torsemide (DEMADEX) 20 MG Tab Take 1 tablet (20 mg total) by mouth once daily.  Qty: 90 tablet, Refills: 3    Comments: .      traZODone (DESYREL) 100 MG tablet Take 1 tablet (100 mg total) by mouth every evening.  Qty: 30 tablet, Refills: 2      umeclidinium (INCRUSE ELLIPTA) 62.5 mcg/actuation inhalation capsule Inhale 1 puff into the lungs once daily. Controller  Qty: 90 each, Refills: 3    Associated Diagnoses: Chronic bronchitis, unspecified chronic bronchitis type      vitamin D (VITAMIN D3) 1000 units Tab Take 1 tablet (1,000 Units total) by mouth once daily.  Qty: 30 tablet, Refills: 2      ZINC ORAL Take by mouth.      calcium carbonate (OS-SANDRINE) 500 mg calcium (1,250 mg) tablet Take 2 tablets (1,000 mg total) by mouth 2 (two) times daily.  Qty: 30 tablet, Refills: 0      cyanocobalamin, vitamin B-12, 1,000 mcg TbSR Take 1,000 mcg by mouth once daily.    Associated Diagnoses: B12 deficiency      denosumab (PROLIA) 60 mg/mL Syrg Inject 1 mL (60 mg total) into the skin every 6 (six) months.  Qty: 2 mL, Refills: 0    Associated Diagnoses: Other osteoporosis, unspecified pathological fracture presence; Closed compression fracture of L3 lumbar vertebra with routine healing, subsequent encounter       ondansetron (ZOFRAN-ODT) 8 MG TbDL dissolve 1 tablet (8 mg total) by mouth every 8 (eight) hours as needed (nausea).  Qty: 60 tablet, Refills: 2    Associated Diagnoses: Recurrent adenocarcinoma of left lung      promethazine (PHENERGAN) 25 MG tablet Take 1 tablet (25 mg total) by mouth every 6 (six) hours as needed for Nausea.  Qty: 60 tablet, Refills: 3    Associated Diagnoses: Recurrent adenocarcinoma of left lung               I have seen and examined this patient within the last 30 days. My clinical findings that support the need for the home health skilled services and home bound status are the following:no   Weakness/numbness causing balance and gait disturbance due to Weakness/Debility making it taxing to leave home.     Diet:   cardiac diet    Labs:  SN to perform labs:  BMP: Weekly; 2 week(s) and Report Lab results to PCP. Repeat BMP within on week of discharge.    Referrals/ Consults  Physical Therapy to evaluate and treat. Evaluate for home safety and equipment needs; Establish/upgrade home exercise program. Perform / instruct on therapeutic exercises, gait training, transfer training, and Range of Motion.  Occupational Therapy to evaluate and treat. Evaluate home environment for safety and equipment needs. Perform/Instruct on transfers, ADL training, ROM, and therapeutic exercises.   to evaluate for community resources/long-range planning.  Aide to provide assistance with personal care, ADLs, and vital signs.    Activities:   activity as tolerated    Patient requires stretcher transport home    Nursing:   Agency to admit patient within 24 hours of hospital discharge unless specified on physician order or at patient request    SN to complete comprehensive assessment including routine vital signs. Instruct on disease process and s/s of complications to report to MD. Review/verify medication list sent home with the patient at time of discharge  and instruct patient/caregiver as needed.  Frequency may be adjusted depending on start of care date.     Skilled nurse to perform up to 3 visits PRN for symptoms related to diagnosis    Notify MD if SBP > 160 or < 90; DBP > 90 or < 50; HR > 120 or < 50; Temp > 101; O2 < 88%; Other:       Ok to schedule additional visits based on staff availability and patient request on consecutive days within the home health episode.    When multiple disciplines ordered:    Start of Care occurs on Sunday - Wednesday schedule remaining discipline evaluations as ordered on separate consecutive days following the start of care.    Thursday SOC -schedule subsequent evaluations Friday and Monday the following week.     Friday - Saturday SOC - schedule subsequent discipline evaluations on consecutive days starting Monday of the following week.    For all post-discharge communication and subsequent orders please contact patient's primary care physician.    Miscellaneous   Home Oxygen:  Oxygen at 3 L/min nasal canula to be used:  Continuously., Assess oxygen saturation via pulse oximeter as needed for increase in SOB., Notify physician if oxygen saturation less than 88%, and Consult respiratory therapy for instruction on home oxygen use    Home Health Aide:  Physical Therapy Three times weekly, Occupational Therapy Three times weekly, Medical Social Work Twice weekly, and Home Health Aide Three times weekly    Wound Care Orders  no    I certify that this patient is confined to her home and needs intermittent skilled nursing care, physical therapy, and occupational therapy.

## 2023-01-24 NOTE — PLAN OF CARE
Home Health orders faxed to [facility] for agency assignment & ambulance auth - spoke with Nichole - awaiting response

## 2023-01-24 NOTE — SUBJECTIVE & OBJECTIVE
Interval History: Patient reports diarrhea is improving. Labs significant for hyperkalemia - given lokelma. Breathing stable on 2L NC. Hopeful d/c for home tomorrow.     Review of Systems   Constitutional:  Positive for appetite change (improved) and fatigue (improving). Negative for chills and fever.   HENT:  Negative for congestion, ear pain and sore throat.    Eyes:  Negative for pain and redness.   Respiratory:  Positive for shortness of breath (stable on 3L O2NC at home). Negative for cough.    Cardiovascular:  Negative for chest pain and leg swelling.   Gastrointestinal:  Positive for diarrhea (recurrent). Negative for nausea and vomiting.   Genitourinary:  Negative for dysuria and flank pain.   Musculoskeletal:  Positive for arthralgias (chronic). Negative for myalgias and neck pain.   Skin:  Negative for rash and wound.   Neurological:  Negative for weakness, numbness and headaches.   Psychiatric/Behavioral:  Negative for agitation, behavioral problems and confusion. The patient is not nervous/anxious.    Objective:     Vital Signs (Most Recent):  Temp: 97.9 °F (36.6 °C) (01/23/23 1944)  Pulse: 105 (01/23/23 2202)  Resp: 20 (01/23/23 2055)  BP: (!) 163/89 (01/23/23 1944)  SpO2: 98 % (01/23/23 2055)   Vital Signs (24h Range):  Temp:  [97.5 °F (36.4 °C)-98.6 °F (37 °C)] 97.9 °F (36.6 °C)  Pulse:  [] 105  Resp:  [14-24] 20  SpO2:  [95 %-100 %] 98 %  BP: (140-175)/(65-89) 163/89     Weight: 96.2 kg (212 lb 1.3 oz)  Body mass index is 34.23 kg/m².    Intake/Output Summary (Last 24 hours) at 1/23/2023 2300  Last data filed at 1/23/2023 1511  Gross per 24 hour   Intake 680 ml   Output 1550 ml   Net -870 ml        Physical Exam  Vitals reviewed.   Constitutional:       Appearance: Normal appearance.   HENT:      Head: Normocephalic and atraumatic.   Eyes:      General: No scleral icterus.     Conjunctiva/sclera: Conjunctivae normal.   Pulmonary:      Effort: Pulmonary effort is normal. No respiratory distress.       Comments: On 2L NC (baseline)  Skin:     Coloration: Skin is not jaundiced.      Findings: No erythema.   Psychiatric:         Mood and Affect: Mood normal.         Behavior: Behavior normal.       Significant Labs: All pertinent labs within the past 24 hours have been reviewed.    Significant Imaging: I have reviewed all pertinent imaging results/findings within the past 24 hours.

## 2023-01-24 NOTE — PROGRESS NOTES
[facility] ([place])  Adult Nutrition  Progress Note    SUMMARY       Recommendations  Add Boost Breeze once a day if PO < 25%  Encourage good PO intake at meals  Monitor strict I&Os/weights  Monitor nutrition related labs  - replace electrolytes as needed    Goals:   Patient to meet >/=75% EEN/EPN by RD follow up  Monitored labs to trend toward target ranges    Nutrition Goal Status: goal met  Communication of RD Recs:  (POC)    Assessment and Plan  Nutrition Problem  Altered nutrition related lab values    Related to (etiology):   UTI, CKD     Signs and Symptoms (as evidenced by):   Hyponatremia, hypokalemia, hypomagnesemia, hypocalcemia, hypoalbuminemia    Interventions:  Collaboration with other providers    Nutrition Diagnosis Status:   Continues      Malnutrition Assessment  Subcutaneous Fat Loss (Final Summary): well nourished  Muscle Loss Evaluation (Final Summary): well nourished  Fluid Accumulation Evaluation: mild         Reason for Assessment  Reason For Assessment: RD follow up  Diagnosis:  (C Diff Diarrhea complicated by Hypovolemia and Electrolyte abnormalities)  Relevant Medical History:   Patient Active Problem List   Diagnosis    Chronic obstructive pulmonary disease    Opioid dependence    Essential hypertension    Iron deficiency anemia    Tobacco dependence    Hyperlipidemia    Osteoporosis    Generalized anxiety disorder    Hypomagnesemia    Pulmonary nodule    Diverticulosis    Hyperkalemia    Chronic diastolic heart failure    History of L3 compression fracture    Vitamin D deficiency    Vitamin B12 deficiency    Chronic pain syndrome    History of lung cancer    Chronic GI bleeding    History of alcohol abuse    Choledocholithiasis    Elevated LFTs    Urinary tract infection without hematuria    Urinary retention    Cervical spinal stenosis    Debility    Acute on chronic respiratory failure with hypoxia and hypercapnia    Hyponatremia    Hypocalcemia    Spinal stenosis    Multiple  thyroid nodules    Severe obesity    CKD (chronic kidney disease) stage 3, GFR 30-59 ml/min    MDD (major depressive disorder), recurrent severe, without psychosis    PTSD (post-traumatic stress disorder)    Aortic atherosclerosis    Iron deficiency anemia due to chronic blood loss    Syndrome of inappropriate secretion of antidiuretic hormone    Other nonthrombocytopenic purpura    Disorder of joint prosthesis    Secondary malignant neoplasm of intrathoracic lymph nodes    Recurrent adenocarcinoma of left lung    Shock, unspecified    Acute renal failure superimposed on stage 3 chronic kidney disease    Advance care planning    Anemia of chronic disease    Electrolyte abnormality    C Diff Diarrhea complicated by Hypovolemia and Electrolyte abnormalities    Pleural effusion     Interdisciplinary Rounds: did not attend  General Information Comments:   1/24: patient in isolation. Called patient to follow up. Patient states her diet hasn't been the best, continue to have issues with [employer], reported on Michel. States she is having issues ordering food, as patient is not use to diet restrictions such as patient wants two cups of coffee for breakfast. Encourage patient to continue with good PO intake. Per RN note, % intake at meals. No difficulty chewing or swallowing. Patient reports intermittent nausea and diarrhea, which is resolving.      1/19: Patient reports good appetite, tolerating at this time. Currently on Cardiac Diet with 1000 mL fluid restriction. Diarrhea has improved. Patient does not like her diet 2/2 she can not have salt. Discussed the importance of low sodium diet r/t dx. Patient mentioned she sometimes has difficulty swallowing but summed it up to chewing too fast. Discussed with patient [employer] issues (wrong menu provided). Mild edema. PIV. Asad 18- R foot blisters, and buttocks dermatitis. NFPE completed 1/19 patient is nourished.    Nutrition Discharge Planning: dc on cardiac  "diet    Nutrition Risk Screen  Nutrition Risk Screen: no indicators present    Nutrition/Diet History  Spiritual, Cultural Beliefs, Jewish Practices, Values that Affect Care: no  Factors Affecting Nutritional Intake: None identified at this time    Anthropometrics  Temp: 98.2 °F (36.8 °C)  Height Method: Stated  Height: 5' 6" (167.6 cm)  Height (inches): 66 in  Weight Method: Estimated  Weight: 96.2 kg (212 lb 1.3 oz)  Weight (lb): 212.08 lb  Ideal Body Weight (IBW), Female: 130 lb  % Ideal Body Weight, Female (lb): 163.14 %  BMI (Calculated): 34.2  BMI Grade: 30 - 34.9- obesity - grade I       Lab/Procedures/Meds  Pertinent Labs Reviewed: reviewed  CBC:  No results for input(s): WBC, HGB, HCT, PLT in the last 24 hours.  CMP:  Recent Labs   Lab 01/24/23  0404   CALCIUM 8.6*   ALBUMIN 1.8*   *   K 5.1   CO2 24      BUN 9   CREATININE 0.7     Pertinent Medications Reviewed: reviewed  Scheduled Meds:   albuterol-ipratropium  3 mL Nebulization Q4H WAKE    ascorbic acid (vitamin C)  250 mg Oral Daily    atorvastatin  40 mg Oral Daily    azelastine  1 spray Nasal BID    cefTRIAXone (ROCEPHIN) IVPB  1 g Intravenous Q24H    collagenase   Topical (Top) Daily    DULoxetine  60 mg Oral Daily    enoxaparin  40 mg Subcutaneous Daily    fluticasone furoate-vilanteroL  1 puff Inhalation Daily    fluticasone propionate  1 spray Each Nostril Daily    gabapentin  300 mg Oral TID    guaiFENesin  1,200 mg Oral BID    metoprolol succinate  50 mg Oral Daily    pantoprazole  40 mg Oral Daily    sodium zirconium cyclosilicate  10 g Oral TID    traZODone  100 mg Oral QHS    vancomycin  125 mg Oral Q6H    vitamin D  1,000 Units Oral Daily     Continuous Infusions:      PRN Meds:.acetaminophen, ALPRAZolam, aluminum-magnesium hydroxide-simethicone, hydrALAZINE, HYDROcodone-acetaminophen, ondansetron, prochlorperazine, simethicone, sodium chloride 0.9%    Estimated/Assessed Needs  Weight Used For Calorie Calculations: 96.2 kg " (212 lb 1.3 oz)  Energy Calorie Requirements (kcal): 2747-5733  Energy Need Method: Kcal/kg (20-25 kcal/kg based on hypoalbuminemia)  Protein Requirements: 77-96 (0.8-1.0 g/kg based on JONATHAN)  Weight Used For Protein Calculations: 96.2 kg (212 lb 1.3 oz)  Fluid Requirements (mL): 1 mL/kcal  Estimated Fluid Requirement Method:  (or per MD)  RDA Method (mL): 1924  CHO Requirement: 241g    Nutrition Prescription Ordered  Current Diet Order: cardiac; 1000 mL    Evaluation of Received Nutrient/Fluid Intake  I/O: +2.4 L since admit  Energy Calories Required: meeting needs  Protein Required: meeting needs  Fluid Required: meeting needs  Comments: LBM 1/23  Tolerance: tolerating  % Intake of Estimated Energy Needs: 75 - 100 %  % Meal Intake: 75 - 100 %  Intake/Output - Last 3 Shifts         01/22 0700  01/23 0659 01/23 0700 01/24 0659 01/24 0700 01/25 0659    P.O. 960 510 150    I.V. (mL/kg)       IV Piggyback 46.2 50     Total Intake(mL/kg) 1006.2 (10.5) 560 (5.8) 150 (1.6)    Urine (mL/kg/hr) 1400 (0.6) 1850 (0.8)     Emesis/NG output 0      Other       Stool 0 0     Total Output 1400 1850     Net -393.8 -1290 +150           Stool Occurrence 0 x 1 x     Emesis Occurrence 0 x            Nutrition Risk  Level of Risk/Frequency of Follow-up:  (1-2 x/week)     Monitor and Evaluation  Food and Nutrient Intake: energy intake, food and beverage intake  Food and Nutrient Adminstration: diet order  Knowledge/Beliefs/Attitudes: food and nutrition knowledge/skill  Physical Activity and Function: nutrition-related ADLs and IADLs, factors affecting access to physical activity  Anthropometric Measurements: weight, weight change, body mass index  Biochemical Data, Medical Tests and Procedures: electrolyte and renal panel, gastrointestinal profile, glucose/endocrine profile, inflammatory profile, lipid profile  Nutrition-Focused Physical Findings: overall appearance     Nutrition Follow-Up  RD Follow-up?: Yes  [place]  Registration Eligible, Provisional LDN

## 2023-01-25 ENCOUNTER — PATIENT OUTREACH (OUTPATIENT)
Dept: ADMINISTRATIVE | Facility: CLINIC | Age: 73
End: 2023-01-25
Payer: MEDICARE

## 2023-01-25 NOTE — PLAN OF CARE
Patient educated on discharge instructions and verbalized understanding.  All questions answered to patient's satisfaction.  IV removed without complication. Patient sent home with 2 lit oxygen via NC.Patient to be transported off unit via Wheelchair.       Problem: Adult Inpatient Plan of Care  Goal: Plan of Care Review  Outcome: Met  Goal: Patient-Specific Goal (Individualized)  Outcome: Met  Goal: Absence of Hospital-Acquired Illness or Injury  Outcome: Met  Goal: Optimal Comfort and Wellbeing  Outcome: Met  Goal: Readiness for Transition of Care  Outcome: Met

## 2023-01-26 ENCOUNTER — TELEPHONE (OUTPATIENT)
Dept: RADIATION ONCOLOGY | Facility: CLINIC | Age: 73
End: 2023-01-26
Payer: MEDICARE

## 2023-01-26 ENCOUNTER — DOCUMENTATION ONLY (OUTPATIENT)
Dept: HEMATOLOGY/ONCOLOGY | Facility: CLINIC | Age: 73
End: 2023-01-26
Payer: MEDICARE

## 2023-01-26 NOTE — PROGRESS NOTES
Please see wound care instructions which have been provided separately.      Progress Note  Nephrology    Admit Date: 7/13/2018   LOS: 2 days     SUBJECTIVE:     Follow-up For:  Hyponatremia    Interval History:    Remians very SOB and wheezy. UOP 1.2L overnight. Still markedly volume overloaded.        OBJECTIVE:     Vital Signs Range  Temp:  [97.6 °F (36.4 °C)-98.5 °F (36.9 °C)]   Pulse:  [68-82]   Resp:  [14-31]   BP: (154-182)/(75-99)   SpO2:  [91 %-95 %]     I & O (Last 24H):  Intake/Output Summary (Last 24 hours) at 07/15/18 1016  Last data filed at 07/15/18 1012   Gross per 24 hour   Intake              890 ml   Output             1345 ml   Net             -455 ml       Physical Exam:  General appearance: Well developed, well nourished  Eyes:  Conjunctivae/corneas clear. PERRL.  Lungs: Normal respiratory effort,   clear to auscultation bilaterally   Heart: Regular rate and rhythm, S1, S2 normal, no murmur, rub or dionte. TLC Rt SC  Abdomen: Soft, non-tender non-distended; bowel sounds normal; no masses,  no organomegaly  Extremities: No cyanosis or clubbing. No edema.    Skin: Skin color, texture, turgor normal. No rashes or lesions  Neurologic: Normal strength and tone. No focal numbness or weakness     Laboratory Data:  Reviewed and noted  where applicable- Please see chart for full lab data.    Medications:  Medication list was reviewed and changes noted under Assessment/Plan.    ASSESSMENT/PLAN:        1. Acute on chronic Hyponatremia with suspected underlying SIADH with acute volume overload (E87.1, E22.2): CT chest 2/18 had multiple ground glass nodules which need to be followed up. She had a serum Na of 134 on 7/6 and had been started on Torsemide 5mg daily by Dr. Hernández at the end of may for her new onset edema.She has not been paying any attention to her diet or fluid intake.Increase  - Today she is Na 122.   - She remains grossly volume overloaded with total body anasarca today.   - Increase Tolvaptan to 30 mg qam and monitor response.   2. Acute on Chronic Laura CHF with wall  motion abnls/Edema (R60.0): Echo noted. No significant urinary protein. Venous dopplers are negative for DVT. Recent liver U/S earlier this year was not impressive. Tolvaptan will help with fluid removal.Does pt need ischemic work given WMA? Defer to Cards.  3. HTN (I10): uncontrolled. Hopefully will improve with fluid removal.   4. Acute exacerbation of COPD/Tobacco dependency (F17.200): Nicotine patch. Nebs, steroids, empiric ABX for exacerbation  5. Thyroid nodule (E04.1): U/S noted. Previously had FNA recommended for 2.1cm nodule. Not sure this has been done. Defer to primary.   6. Anemia: Likely AOCD, but check B12, Fe, Folate

## 2023-01-26 NOTE — PROGRESS NOTES
Social Work Consult    Name:Nalini Varma  : 1950  MRN: [id number]    Referral:Transportation    SW received consult from Magda Marie RN. Patient is now home and has radiation schedule set for -2/10 Mon-Fri at 12:45p.    SW spoke to patient who confirmed need and timing. She request pickup at 12p. MAURY emailed Lucila at [facility] requesting this schedule and confirmation that the email was received.

## 2023-01-26 NOTE — TELEPHONE ENCOUNTER
I spoke with Ms. Varma by phone to discuss her treatment plan going forward. She was recently discharged from [facility] for C.Diff diarrhea. Her last dose of radiation was on 1/9/23, fraction 23 of a planned 30 (46 Gy out of planned 60 Gy).     She would like to complete out the course if possible. To make up for the 3 week break, I plan to increase her total dose to 66 Gy in 33 fractions. She will therefore receive 10 additional fractions. She denies any pain or difficulty swallowing and otherwise has not had significant side effects related to chest radiation.     Plan:  1) Resume radiation treatment on Monday 1/30/23 at [facility]. 10 fractions remaining.

## 2023-01-26 NOTE — TELEPHONE ENCOUNTER
Spoke with Pt. She agreed to 1245 xrt treatment time, beginning on !/30/2023.  SW will contact pt. With transportation arrangements.

## 2023-01-27 PROCEDURE — G0180 PR HOME HEALTH MD CERTIFICATION: ICD-10-PCS | Mod: ,,, | Performed by: INTERNAL MEDICINE

## 2023-01-27 PROCEDURE — G0180 MD CERTIFICATION HHA PATIENT: HCPCS | Mod: ,,, | Performed by: INTERNAL MEDICINE

## 2023-01-31 ENCOUNTER — PATIENT MESSAGE (OUTPATIENT)
Dept: INTERNAL MEDICINE | Facility: CLINIC | Age: 73
End: 2023-01-31
Payer: MEDICARE

## 2023-01-31 ENCOUNTER — PATIENT MESSAGE (OUTPATIENT)
Dept: HEMATOLOGY/ONCOLOGY | Facility: CLINIC | Age: 73
End: 2023-01-31
Payer: MEDICARE

## 2023-01-31 ENCOUNTER — TELEPHONE (OUTPATIENT)
Dept: RADIATION ONCOLOGY | Facility: CLINIC | Age: 73
End: 2023-01-31
Payer: MEDICARE

## 2023-01-31 DIAGNOSIS — R29.6 FREQUENT FALLS: Primary | ICD-10-CM

## 2023-01-31 DIAGNOSIS — R53.81 DEBILITY: ICD-10-CM

## 2023-01-31 DIAGNOSIS — T84.9XXA: ICD-10-CM

## 2023-01-31 NOTE — TELEPHONE ENCOUNTER
Spoke with Pt.  She is not able to come to radiation appts. Today or until her energy returns.Due to continued weakness.  Pt. Has homehealth nurse expected either today or tomorrow, as well as PT home health. This RN told Pt. She would forward her message to  radiation oncologist.

## 2023-01-31 NOTE — TELEPHONE ENCOUNTER
Rx for alice commode signed  We can add her to my schedule for this Thursday at 3:30p for a virtual visit if that works for her

## 2023-02-01 ENCOUNTER — APPOINTMENT (OUTPATIENT)
Dept: RADIATION THERAPY | Facility: OTHER | Age: 73
End: 2023-02-01
Attending: RADIOLOGY
Payer: MEDICARE

## 2023-02-01 ENCOUNTER — PATIENT MESSAGE (OUTPATIENT)
Dept: HEMATOLOGY/ONCOLOGY | Facility: CLINIC | Age: 73
End: 2023-02-01
Payer: MEDICARE

## 2023-02-01 NOTE — TELEPHONE ENCOUNTER
"Returned call. Checked up on Ms. Varma. She reports being very weak in getting up. "I have no energy". "I still have diarrhea". She described her diarrhea as "not formed but mushy" denied any liquid diarrhea. Reports have poor appetitive. She states that Home Health has been coming out. She is wanting to know about her plan for right now. She reports that "Dr. Mead stopped all my chemo". Patient with questions. Answered them accordingly.   "

## 2023-02-02 ENCOUNTER — OFFICE VISIT (OUTPATIENT)
Dept: INTERNAL MEDICINE | Facility: CLINIC | Age: 73
End: 2023-02-02
Attending: INTERNAL MEDICINE
Payer: MEDICARE

## 2023-02-02 VITALS — SYSTOLIC BLOOD PRESSURE: 130 MMHG | DIASTOLIC BLOOD PRESSURE: 80 MMHG

## 2023-02-02 DIAGNOSIS — J96.11 CHRONIC RESPIRATORY FAILURE WITH HYPOXIA: ICD-10-CM

## 2023-02-02 DIAGNOSIS — E79.0 ELEVATED URIC ACID IN BLOOD: ICD-10-CM

## 2023-02-02 DIAGNOSIS — E55.9 VITAMIN D DEFICIENCY: ICD-10-CM

## 2023-02-02 DIAGNOSIS — E87.5 HYPERKALEMIA: ICD-10-CM

## 2023-02-02 DIAGNOSIS — D50.0 IRON DEFICIENCY ANEMIA DUE TO CHRONIC BLOOD LOSS: Primary | ICD-10-CM

## 2023-02-02 DIAGNOSIS — F11.20 UNCOMPLICATED OPIOID DEPENDENCE: ICD-10-CM

## 2023-02-02 DIAGNOSIS — C34.92 RECURRENT ADENOCARCINOMA OF LEFT LUNG: ICD-10-CM

## 2023-02-02 DIAGNOSIS — I10 ESSENTIAL HYPERTENSION: ICD-10-CM

## 2023-02-02 DIAGNOSIS — I70.0 AORTIC ATHEROSCLEROSIS: ICD-10-CM

## 2023-02-02 DIAGNOSIS — F17.200 TOBACCO DEPENDENCE: ICD-10-CM

## 2023-02-02 DIAGNOSIS — N28.9 RENAL LESION: ICD-10-CM

## 2023-02-02 DIAGNOSIS — E22.2 SYNDROME OF INAPPROPRIATE SECRETION OF ANTIDIURETIC HORMONE: ICD-10-CM

## 2023-02-02 DIAGNOSIS — Z09 HOSPITAL DISCHARGE FOLLOW-UP: ICD-10-CM

## 2023-02-02 DIAGNOSIS — Z85.118 HISTORY OF LUNG CANCER: ICD-10-CM

## 2023-02-02 DIAGNOSIS — D69.2 OTHER NONTHROMBOCYTOPENIC PURPURA: ICD-10-CM

## 2023-02-02 DIAGNOSIS — E87.1 HYPONATREMIA: ICD-10-CM

## 2023-02-02 DIAGNOSIS — G99.2 MYELOPATHY IN DISEASES CLASSIFIED ELSEWHERE: ICD-10-CM

## 2023-02-02 DIAGNOSIS — I50.32 CHRONIC DIASTOLIC HEART FAILURE: ICD-10-CM

## 2023-02-02 DIAGNOSIS — E83.42 HYPOMAGNESEMIA: ICD-10-CM

## 2023-02-02 DIAGNOSIS — J96.22 ACUTE ON CHRONIC RESPIRATORY FAILURE WITH HYPOXIA AND HYPERCAPNIA: ICD-10-CM

## 2023-02-02 DIAGNOSIS — J96.21 ACUTE ON CHRONIC RESPIRATORY FAILURE WITH HYPOXIA AND HYPERCAPNIA: ICD-10-CM

## 2023-02-02 DIAGNOSIS — J90 PLEURAL EFFUSION: ICD-10-CM

## 2023-02-02 DIAGNOSIS — J44.9 CHRONIC OBSTRUCTIVE PULMONARY DISEASE, UNSPECIFIED COPD TYPE: ICD-10-CM

## 2023-02-02 PROCEDURE — 1101F PR PT FALLS ASSESS DOC 0-1 FALLS W/OUT INJ PAST YR: ICD-10-PCS | Mod: CPTII,95,, | Performed by: INTERNAL MEDICINE

## 2023-02-02 PROCEDURE — 1159F MED LIST DOCD IN RCRD: CPT | Mod: CPTII,95,, | Performed by: INTERNAL MEDICINE

## 2023-02-02 PROCEDURE — 3075F SYST BP GE 130 - 139MM HG: CPT | Mod: CPTII,95,, | Performed by: INTERNAL MEDICINE

## 2023-02-02 PROCEDURE — 3075F PR MOST RECENT SYSTOLIC BLOOD PRESS GE 130-139MM HG: ICD-10-PCS | Mod: CPTII,95,, | Performed by: INTERNAL MEDICINE

## 2023-02-02 PROCEDURE — 99214 PR OFFICE/OUTPT VISIT, EST, LEVL IV, 30-39 MIN: ICD-10-PCS | Mod: 95,,, | Performed by: INTERNAL MEDICINE

## 2023-02-02 PROCEDURE — 1159F PR MEDICATION LIST DOCUMENTED IN MEDICAL RECORD: ICD-10-PCS | Mod: CPTII,95,, | Performed by: INTERNAL MEDICINE

## 2023-02-02 PROCEDURE — 3079F DIAST BP 80-89 MM HG: CPT | Mod: CPTII,95,, | Performed by: INTERNAL MEDICINE

## 2023-02-02 PROCEDURE — 1125F PR PAIN SEVERITY QUANTIFIED, PAIN PRESENT: ICD-10-PCS | Mod: CPTII,95,, | Performed by: INTERNAL MEDICINE

## 2023-02-02 PROCEDURE — 3288F PR FALLS RISK ASSESSMENT DOCUMENTED: ICD-10-PCS | Mod: CPTII,95,, | Performed by: INTERNAL MEDICINE

## 2023-02-02 PROCEDURE — 4010F PR ACE/ARB THEARPY RXD/TAKEN: ICD-10-PCS | Mod: CPTII,95,, | Performed by: INTERNAL MEDICINE

## 2023-02-02 PROCEDURE — 3079F PR MOST RECENT DIASTOLIC BLOOD PRESSURE 80-89 MM HG: ICD-10-PCS | Mod: CPTII,95,, | Performed by: INTERNAL MEDICINE

## 2023-02-02 PROCEDURE — 1160F RVW MEDS BY RX/DR IN RCRD: CPT | Mod: CPTII,95,, | Performed by: INTERNAL MEDICINE

## 2023-02-02 PROCEDURE — 1111F PR DISCHARGE MEDS RECONCILED W/ CURRENT OUTPATIENT MED LIST: ICD-10-PCS | Mod: CPTII,95,, | Performed by: INTERNAL MEDICINE

## 2023-02-02 PROCEDURE — 1111F DSCHRG MED/CURRENT MED MERGE: CPT | Mod: CPTII,95,, | Performed by: INTERNAL MEDICINE

## 2023-02-02 PROCEDURE — 3288F FALL RISK ASSESSMENT DOCD: CPT | Mod: CPTII,95,, | Performed by: INTERNAL MEDICINE

## 2023-02-02 PROCEDURE — 1101F PT FALLS ASSESS-DOCD LE1/YR: CPT | Mod: CPTII,95,, | Performed by: INTERNAL MEDICINE

## 2023-02-02 PROCEDURE — 1125F AMNT PAIN NOTED PAIN PRSNT: CPT | Mod: CPTII,95,, | Performed by: INTERNAL MEDICINE

## 2023-02-02 PROCEDURE — 1160F PR REVIEW ALL MEDS BY PRESCRIBER/CLIN PHARMACIST DOCUMENTED: ICD-10-PCS | Mod: CPTII,95,, | Performed by: INTERNAL MEDICINE

## 2023-02-02 PROCEDURE — 4010F ACE/ARB THERAPY RXD/TAKEN: CPT | Mod: CPTII,95,, | Performed by: INTERNAL MEDICINE

## 2023-02-02 PROCEDURE — 99214 OFFICE O/P EST MOD 30 MIN: CPT | Mod: 95,,, | Performed by: INTERNAL MEDICINE

## 2023-02-02 RX ORDER — ALBUTEROL SULFATE 90 UG/1
AEROSOL, METERED RESPIRATORY (INHALATION)
Qty: 25.5 G | Refills: 11 | Status: ON HOLD | OUTPATIENT
Start: 2023-02-02 | End: 2023-06-15

## 2023-02-02 NOTE — PROGRESS NOTES
"Subjective:   Patient ID: Nalini Varma is a 72 y.o. female  Chief complaint: No chief complaint on file.      HPI  The patient location is: [place]  The chief complaint leading to consultation is: hospital discharge follow up     Visit type: audiovisual    Face to Face time with patient: 47 min  52 minutes of total time spent on the encounter, which includes face to face time and non-face to face time preparing to see the patient (eg, review of tests), Obtaining and/or reviewing separately obtained history, Documenting clinical information in the electronic or other health record, Independently interpreting results (not separately reported) and communicating results to the patient/family/caregiver, or Care coordination (not separately reported).     Each patient to whom he or she provides medical services by telemedicine is:  (1) informed of the relationship between the physician and patient and the respective role of any other health care provider with respect to management of the patient; and (2) notified that he or she may decline to receive medical services by telemedicine and may withdraw from such care at any time.    Notes:   Pt here for hospital discharge follow up   Admitted 1/17/2023 - no discharge summary to review   Discharged 1/24/2023  Last progress note: 1/23 reviewed    Seen in "ER for hypotension and increased shortness of breath for 6 days. Patient reports non-bloody diarrhea for 6 days, associated with loss of appetite, nausea, non-bloody vomiting, lethargy, chills. Patient also reports increased SOB but denies purulent sputum, sore throat or fever. Patient denies orthopnea or bilateral lower extremity swelling. Patient also reports dysuria and suprapubic tenderness."    UA nitrates and 3 leuks   Lactate 2.3  Hg 8.4 <- 7  Bnp 139  Cr 1.4  CXR with findings of  edema noting left pleural effusion  Nephrology and Pulmonology was consulted.  Patient was started on IV Ceftriaxone 1g for " symptomatic UTI.  Cdiff: started on PO vanc and completed full course     Hyponatremia 2/2 SIADH, hypomagnesemia, hypocalcemia, hypoalbuminemia  - Nephrology consulted  Currently taking oral mag twice since home    LESLY: c/w oral iron since home     Chemo stopped   To f/u with h/o in 8 weeks   Receiving HH through [facility]     Byron on ckd3:   Resolved with ivfs    SIADH:   Na 136 after fluids - stable    MDD:  Stable   - c/w medication     Acute on chronic rest failure with hypoxia and hypercapnia:   Still smoking at this time   Seen by pulm and no tap rec due to her current condition   Rec to cont home COPD regimen   She is c/w medication   C/w diuretic     Smoking:   As above   Still smoking   Attended tob cess in past - not ready to quit currently     COPD - breathing is stable   On 3L which is her baseline    UTI:  Completed 3 days of ctx   Sx of dysuria resolved     Chornic diastolic hf:   Breathing much better from when admitted   C/w meds     Hyperkalemia:   - completed lokelma for 5 days     HTN:  Arb held due to byron and gi losses and uti   Cont on BB 50mg daily   - has torsemide 20mg daily   - held losartan 100mg since discharge  - she resumed losartan daily on her home and bp was low at 90/40 and then stopped and took med qod  Bp taken today 116/90 by PT then nurse came later 134/80    Watery diarrhea 2/2 cdiff resolved:     Just completed po vanc and sx improved    No further diarrhea     Recurrent adenoCA of left lung, pleural effusion:  Radiation dose 1/9/2023 - to resume 1/30/2023 but too weak to come to radiation appts     To add boost breeze once daily     MDD:   Lisa avnita     Son is aware of lung cancer dx   Son has lymphoma     Osteoprosis:   Due for prolia - was given by our staff 9/21/2022 - due in March 2023    Receiving HH twice per week   Did get hospital bed yesterday     Does not need refills     Review of Systems   Constitutional:  Positive for activity change. Negative for unexpected weight  change.   HENT:  Positive for hearing loss and rhinorrhea. Negative for trouble swallowing.    Eyes:  Positive for discharge. Negative for visual disturbance.   Respiratory:  Positive for wheezing. Negative for chest tightness.    Cardiovascular:  Negative for chest pain and palpitations.   Gastrointestinal:  Positive for diarrhea. Negative for blood in stool, constipation and vomiting.   Endocrine: Negative for polydipsia and polyuria.   Genitourinary:  Positive for dysuria. Negative for difficulty urinating, hematuria and menstrual problem.   Musculoskeletal:  Positive for arthralgias and joint swelling. Negative for neck pain.   Neurological:  Positive for weakness. Negative for headaches.   Psychiatric/Behavioral:  Negative for confusion and dysphoric mood.      Objective:  Vitals:    02/02/23 1530   BP: 130/80     There is no height or weight on file to calculate BMI.    Physical Exam  Constitutional:       General: She is not in acute distress.     Appearance: She is well-developed. She is not diaphoretic.   Eyes:      General:         Right eye: No discharge.         Left eye: No discharge.      Conjunctiva/sclera: Conjunctivae normal.   Pulmonary:      Effort: Pulmonary effort is normal. No respiratory distress.      Comments: Nc in place   Talking in complete sentences  Skin:     General: Skin is warm.      Findings: No erythema.   Neurological:      Mental Status: She is alert and oriented to person, place, and time.   Psychiatric:         Behavior: Behavior normal.         Thought Content: Thought content normal.         Judgment: Judgment normal.       Assessment:  1. Iron deficiency anemia due to chronic blood loss    2. Elevated uric acid in blood    3. Hyponatremia    4. Hypomagnesemia    5. Pleural effusion    6. Acute on chronic respiratory failure with hypoxia and hypercapnia    7. Chronic respiratory failure with hypoxia    8. Renal lesion    9. Myelopathy in diseases classified elsewhere    10.  Other nonthrombocytopenic purpura    11. Uncomplicated opioid dependence    12. Aortic atherosclerosis    13. Hospital discharge follow-up    14. Hyperkalemia    15. Essential hypertension    16. Chronic diastolic heart failure    17. Chronic obstructive pulmonary disease, unspecified COPD type    18. History of lung cancer    19. Recurrent adenocarcinoma of left lung    20. Syndrome of inappropriate secretion of antidiuretic hormone    21. Vitamin D deficiency    22. Tobacco dependence        Plan:  Diagnoses and all orders for this visit:    Iron deficiency anemia due to chronic blood loss  -     CBC Auto Differential; Future  Cont oral iron   Check cbc with next labs     Elevated uric acid in blood  -     URIC ACID; Future  Repeat with labs     Hyponatremia  -     Comprehensive Metabolic Panel; Future  2/2 to siadh   Repeat level to monitor     Hypomagnesemia  -     Magnesium; Future  Cont suppl   Repeat level to monitor     Pleural effusion  -     X-Ray Chest PA And Lateral; Future  Check cxr in a few weeks to monitor   Keep appt with specialists   Consider f/u with pulm if not improved or if worsens to revisit tap   Currently stable on home oxygen     Acute on chronic respiratory failure with hypoxia and hypercapnia  -     Ambulatory referral/consult to Cardiology; Future  Stable f/u with cards   Cont meds     Chronic respiratory failure with hypoxia  -     albuterol (VENTOLIN HFA) 90 mcg/actuation inhaler; inhale 1-2 puffs as needed every 6 hours for wheezing and shortness of breath  Counseled to not smoke   Cont meds and suppl oxygen    Renal lesion  -     US Retroperitoneal Complete (Kidney and; Future    Myelopathy in diseases classified elsewhere  Stable   Cont to monitor   Pt declines surgical intervention  Eval by nsx during a prior hospitalization and sx stable     Other nonthrombocytopenic purpura  Stable   Monitor     Uncomplicated opioid dependence  Stable   Cont meds    reviewed and consistent      Aortic atherosclerosis  Stable   Cont staitn     Hospital discharge follow-up  Cont HH     Hyperkalemia  Due for repeat cmp   S/p tx as per hpi    Essential hypertension  Stable   See below     Chronic diastolic heart failure  Stable   Cont meds     Chronic obstructive pulmonary disease, unspecified COPD type  Stable   Cont meds     History of lung cancer  Stable   F/u with rad onc and h/o   Counseled to STOP tob     Recurrent adenocarcinoma of left lung  As above     Syndrome of inappropriate secretion of antidiuretic hormone  Stable   Monitor sodium    Vitamin D deficiency  Stable   Cont suppl     Tobacco dependence  Counseled to stop tob   Tob cess referral when ready     Will order HH labs through [facility]   Will f/u on cmp and can ad back losartan if needed at lower dose if electrolytes stable   Can consider switching back to coreg from metoprolol   Resume digital htn bp readings     Improvoed   Us and cxr in 4 weeks   F/u in person with me in 4 weeks   Bs commode ordered 2 days ago     Health Maintenance   Topic Date Due    Mammogram  09/21/2023    High Dose Statin  01/18/2024    TETANUS VACCINE  01/29/2025    DEXA Scan  04/14/2025    Lipid Panel  02/15/2027    Hepatitis C Screening  Completed

## 2023-02-03 ENCOUNTER — DOCUMENTATION ONLY (OUTPATIENT)
Dept: HEMATOLOGY/ONCOLOGY | Facility: CLINIC | Age: 73
End: 2023-02-03
Payer: MEDICARE

## 2023-02-03 NOTE — PROGRESS NOTES
MAURY emailed Lucila at [facility] to confirm rides for next week Mon-Fri 12p pickup going to [facility].    MAURY left a VM with patient to let her know he was arranging transportation.    Patient's last day of radiation should be 2/10/23.

## 2023-02-04 ENCOUNTER — TELEPHONE (OUTPATIENT)
Dept: INTERNAL MEDICINE | Facility: CLINIC | Age: 73
End: 2023-02-04

## 2023-02-04 PROBLEM — E86.1 HYPOVOLEMIA DEHYDRATION: Status: RESOLVED | Noted: 2023-01-18 | Resolved: 2023-02-04

## 2023-02-04 PROBLEM — G99.2 MYELOPATHY IN DISEASES CLASSIFIED ELSEWHERE: Status: ACTIVE | Noted: 2023-02-04

## 2023-02-04 PROBLEM — N39.0 URINARY TRACT INFECTION WITHOUT HEMATURIA: Status: RESOLVED | Noted: 2021-04-30 | Resolved: 2023-02-04

## 2023-02-05 NOTE — TELEPHONE ENCOUNTER
Labs to be arranged through [facility] for next week - labs ordered at [facility] last week - please fax over orders thanks!   Pt to f/u with me in 1 month

## 2023-02-07 DIAGNOSIS — C34.92 RECURRENT ADENOCARCINOMA OF LEFT LUNG: Primary | ICD-10-CM

## 2023-02-07 NOTE — PHYSICIAN QUERY
PT Name: Nalini Varma  MR #: [id number]     DOCUMENTATION CLARIFICATION     CDS/: Judah Nichole RN, CCDS        Contact information:   [email or web address]     This form is a permanent document in the medical record.     Query Date: February 7, 2023    By submitting this query, we are merely seeking further clarification of documentation.  Please utilize your independent clinical judgment when addressing the question(s) below.  The Medical Record contains the following:  Indicators Supporting Clinical Findings Location in Medical Record   x HR         RR          BP        Temp T97.7 °F (36.5 °C);  Pulse:  [] 85;  Resp:  [17-25] 19; BP: ()/(43-55) 104/52    T98.0;  Pulse:  [] 85; Resp:  [16-25] 22;  BP: ()/(43-69) 112/69    T: 98.1 °F;  Pulse:  [73-91] 79;  Resp:  [14-22] 14;  BP: ()/(50-69) 125/64    97.4 °F;  Pulse:  [67-87] 67;  Resp:  [16-20] 17;   BP: (103-135)/(56-83) 135/83   1/17 H&P: HM    1/18  HM    1/19 HM    1/20 HM   x Lactic Acid          Procalcitonin Lactate 2.3 on admit then 0.7 next day.   Labs     x WBC           Bands          CRP  WBC: 9, 8.04, 4.71, 6.16, 4.99, 3.5   Labs    x Culture(s) C. Diff positive antigen  1/18 Urine culture: salmonella >100,000   1/20 Infectious disease  1/18 micro     AMS, Confusion, LOC, etc.     x Organ Dysfunction/Failure Urinary tract infection without hematuria  Acute Diarrhea with Volume Depletion and Electrolyte Abnormalities  Acute renal failure superimposed on stage 3 chronic kidney disease   1/20 HM   x Bacteremia or Sepsis / Septic Essential hypertension:  Hypotensive from GI losses and sepsis secondary to UTI    1/17 H&P: HM   x Known or Suspected Source of Infection documented Urinary tract infection without hematuria- IV Ceftriaxone 1g daily     (Failed) Outpatient Treatment     x Medication Ceftriaxone, IV; 1/18, 1/23;  1/24  Vanco, PO; 1/24 (2 doses)  NS: bolus, 1000 ml; 1/17  NS: bolus, 500 ml;   "1/17   MAR  "  "  "    Treatment     x Other HM -Patient is admitted to inpatient service with Hospital Medicine for management of electrolyte abnormalities, left pleural effusion and symptomatic UTI     Patient was started on IV Ceftriaxone 1g for symptomatic UTI.;  admitted to inpatient service with Hospital Medicine for management of electrolyte abnormalities, left pleural effusion and symptomatic UTI;  Problems Resolved During this Admission:   Principle Problem :  C Diff Diarrhea complicated by Hypovolemia and Electrolyte abnormalities   Urinary tract infection without hematuria  1/17 H&P:       1/24 Discharge summary         Provider, please specify diagnosis or diagnoses associated with above clinical findings.  [   ] Sepsis Ruled Out   [   ] Sepsis due to unknown organism   [   ] Sepsis due to (suspected) organism (please specify): __________   [   ] Severe Sepsis with Acute Organ Dysfunction/Failure (please specify organ dysfunction/failure): _______   [  x ] SIRS with infection but without Sepsis   [   ] Other Infectious Disease (please specify): __________   [  ] Clinically Undetermined       Please document in your progress notes daily for the duration of treatment until resolved and include in your discharge summary.     "

## 2023-02-08 ENCOUNTER — PATIENT MESSAGE (OUTPATIENT)
Dept: INTERNAL MEDICINE | Facility: CLINIC | Age: 73
End: 2023-02-08
Payer: MEDICARE

## 2023-02-08 DIAGNOSIS — E87.1 HYPONATREMIA: ICD-10-CM

## 2023-02-08 DIAGNOSIS — E79.0 ELEVATED URIC ACID IN BLOOD: ICD-10-CM

## 2023-02-08 DIAGNOSIS — E83.42 HYPOMAGNESEMIA: Primary | ICD-10-CM

## 2023-02-08 DIAGNOSIS — D50.0 IRON DEFICIENCY ANEMIA DUE TO CHRONIC BLOOD LOSS: ICD-10-CM

## 2023-02-10 ENCOUNTER — DOCUMENTATION ONLY (OUTPATIENT)
Dept: HEMATOLOGY/ONCOLOGY | Facility: CLINIC | Age: 73
End: 2023-02-10
Payer: MEDICARE

## 2023-02-10 NOTE — PROGRESS NOTES
SW received call from patient. She has labs, xray and ultrasound. Pickup needed at 11:15a on 2/15/23 going to [facility].     MAURY arranged transportation through [facility].

## 2023-02-11 ENCOUNTER — TELEPHONE (OUTPATIENT)
Dept: ENDOSCOPY | Facility: HOSPITAL | Age: 73
End: 2023-02-11
Payer: MEDICARE

## 2023-02-11 DIAGNOSIS — R93.89 ABNORMAL FINDING ON IMAGING: Primary | ICD-10-CM

## 2023-02-11 NOTE — TELEPHONE ENCOUNTER
----- Message from Mariza Cornell MA sent at 2/4/2023  3:15 PM CST -----  Mariza Cornell MA  [facility] Advanced Endoscopy Recall        - Perform magnetic resonance imaging (MRI) with                          gadolinium in 1 year.   Attending Participation:        I personally performed the entire procedure.   Helio Cheema MD   4/25/2022 1:55:10 PM

## 2023-02-12 PROCEDURE — 77336 RADIATION PHYSICS CONSULT: CPT | Performed by: RADIOLOGY

## 2023-02-13 ENCOUNTER — HOSPITAL ENCOUNTER (INPATIENT)
Facility: OTHER | Age: 73
LOS: 7 days | Discharge: HOME HEALTH | DRG: 291 | End: 2023-02-20
Attending: EMERGENCY MEDICINE | Admitting: HOSPITALIST
Payer: MEDICARE

## 2023-02-13 DIAGNOSIS — I50.33 ACUTE ON CHRONIC DIASTOLIC HEART FAILURE: ICD-10-CM

## 2023-02-13 DIAGNOSIS — J96.90 RESPIRATORY FAILURE: ICD-10-CM

## 2023-02-13 DIAGNOSIS — I10 HYPERTENSION, BENIGN: ICD-10-CM

## 2023-02-13 DIAGNOSIS — I50.9 ACUTE ON CHRONIC CONGESTIVE HEART FAILURE, UNSPECIFIED HEART FAILURE TYPE: ICD-10-CM

## 2023-02-13 DIAGNOSIS — R06.02 SHORTNESS OF BREATH: ICD-10-CM

## 2023-02-13 DIAGNOSIS — R06.81 BREATHLESSNESS: ICD-10-CM

## 2023-02-13 DIAGNOSIS — J96.21 ACUTE ON CHRONIC RESPIRATORY FAILURE WITH HYPOXIA: Primary | ICD-10-CM

## 2023-02-13 PROBLEM — R19.7 DIARRHEA: Status: ACTIVE | Noted: 2023-02-13

## 2023-02-13 LAB
ALBUMIN SERPL BCP-MCNC: 2.5 G/DL (ref 3.5–5.2)
ALLENS TEST: ABNORMAL
ALP SERPL-CCNC: 101 U/L (ref 55–135)
ALT SERPL W/O P-5'-P-CCNC: 12 U/L (ref 10–44)
ANION GAP SERPL CALC-SCNC: 17 MMOL/L (ref 8–16)
ANION GAP SERPL CALC-SCNC: 18 MMOL/L (ref 8–16)
AST SERPL-CCNC: 38 U/L (ref 10–40)
B2 MICROGLOB SERPL-MCNC: 3.3 UG/ML (ref 0–2.5)
BASOPHILS # BLD AUTO: 0.01 K/UL (ref 0–0.2)
BASOPHILS # BLD AUTO: 0.01 K/UL (ref 0–0.2)
BASOPHILS NFR BLD: 0.1 % (ref 0–1.9)
BASOPHILS NFR BLD: 0.1 % (ref 0–1.9)
BILIRUB SERPL-MCNC: 0.5 MG/DL (ref 0.1–1)
BNP SERPL-MCNC: 378 PG/ML (ref 0–99)
BUN SERPL-MCNC: 8 MG/DL (ref 8–23)
BUN SERPL-MCNC: 8 MG/DL (ref 8–23)
CALCIUM SERPL-MCNC: 7.1 MG/DL (ref 8.7–10.5)
CALCIUM SERPL-MCNC: 7.3 MG/DL (ref 8.7–10.5)
CHLORIDE SERPL-SCNC: 96 MMOL/L (ref 95–110)
CHLORIDE SERPL-SCNC: 97 MMOL/L (ref 95–110)
CO2 SERPL-SCNC: 16 MMOL/L (ref 23–29)
CO2 SERPL-SCNC: 18 MMOL/L (ref 23–29)
CREAT SERPL-MCNC: 0.8 MG/DL (ref 0.5–1.4)
CREAT SERPL-MCNC: 0.8 MG/DL (ref 0.5–1.4)
DELSYS: ABNORMAL
DIFFERENTIAL METHOD: ABNORMAL
DIFFERENTIAL METHOD: ABNORMAL
EOSINOPHIL # BLD AUTO: 0 K/UL (ref 0–0.5)
EOSINOPHIL # BLD AUTO: 0 K/UL (ref 0–0.5)
EOSINOPHIL NFR BLD: 0 % (ref 0–8)
EOSINOPHIL NFR BLD: 0.1 % (ref 0–8)
EP: 5
ERYTHROCYTE [DISTWIDTH] IN BLOOD BY AUTOMATED COUNT: 17.2 % (ref 11.5–14.5)
ERYTHROCYTE [DISTWIDTH] IN BLOOD BY AUTOMATED COUNT: 17.3 % (ref 11.5–14.5)
EST. GFR  (NO RACE VARIABLE): >60 ML/MIN/1.73 M^2
EST. GFR  (NO RACE VARIABLE): >60 ML/MIN/1.73 M^2
ESTIMATED AVG GLUCOSE: 80 MG/DL (ref 68–131)
FIO2: 35
GLUCOSE SERPL-MCNC: 120 MG/DL (ref 70–110)
GLUCOSE SERPL-MCNC: 66 MG/DL (ref 70–110)
HBA1C MFR BLD: 4.4 % (ref 4–5.6)
HCO3 UR-SCNC: 19.7 MMOL/L (ref 24–28)
HCT VFR BLD AUTO: 25.9 % (ref 37–48.5)
HCT VFR BLD AUTO: 26.5 % (ref 37–48.5)
HGB BLD-MCNC: 8.5 G/DL (ref 12–16)
HGB BLD-MCNC: 8.5 G/DL (ref 12–16)
IMM GRANULOCYTES # BLD AUTO: 0.06 K/UL (ref 0–0.04)
IMM GRANULOCYTES # BLD AUTO: 0.07 K/UL (ref 0–0.04)
IMM GRANULOCYTES NFR BLD AUTO: 0.5 % (ref 0–0.5)
IMM GRANULOCYTES NFR BLD AUTO: 0.7 % (ref 0–0.5)
IP: 10
LACTATE SERPL-SCNC: 1.1 MMOL/L (ref 0.5–2.2)
LYMPHOCYTES # BLD AUTO: 0.2 K/UL (ref 1–4.8)
LYMPHOCYTES # BLD AUTO: 1.5 K/UL (ref 1–4.8)
LYMPHOCYTES NFR BLD: 11.5 % (ref 18–48)
LYMPHOCYTES NFR BLD: 2.3 % (ref 18–48)
MAGNESIUM SERPL-MCNC: <0.7 MG/DL (ref 1.6–2.6)
MCH RBC QN AUTO: 32.6 PG (ref 27–31)
MCH RBC QN AUTO: 32.9 PG (ref 27–31)
MCHC RBC AUTO-ENTMCNC: 32.1 G/DL (ref 32–36)
MCHC RBC AUTO-ENTMCNC: 32.8 G/DL (ref 32–36)
MCV RBC AUTO: 100 FL (ref 82–98)
MCV RBC AUTO: 102 FL (ref 82–98)
MODE: ABNORMAL
MONOCYTES # BLD AUTO: 0.1 K/UL (ref 0.3–1)
MONOCYTES # BLD AUTO: 1 K/UL (ref 0.3–1)
MONOCYTES NFR BLD: 0.9 % (ref 4–15)
MONOCYTES NFR BLD: 7.9 % (ref 4–15)
NEUTROPHILS # BLD AUTO: 10.4 K/UL (ref 1.8–7.7)
NEUTROPHILS # BLD AUTO: 9.3 K/UL (ref 1.8–7.7)
NEUTROPHILS NFR BLD: 79.9 % (ref 38–73)
NEUTROPHILS NFR BLD: 96 % (ref 38–73)
NRBC BLD-RTO: 0 /100 WBC
NRBC BLD-RTO: 0 /100 WBC
PCO2 BLDA: 25.6 MMHG (ref 35–45)
PH SMN: 7.49 [PH] (ref 7.35–7.45)
PHOSPHATE SERPL-MCNC: 3.2 MG/DL (ref 2.7–4.5)
PLATELET # BLD AUTO: 223 K/UL (ref 150–450)
PLATELET # BLD AUTO: 251 K/UL (ref 150–450)
PMV BLD AUTO: 10 FL (ref 9.2–12.9)
PMV BLD AUTO: 9.5 FL (ref 9.2–12.9)
PO2 BLDA: 147 MMHG (ref 80–100)
POC BE: -4 MMOL/L
POC SATURATED O2: 99 % (ref 95–100)
POC TCO2: 20 MMOL/L (ref 23–27)
POTASSIUM SERPL-SCNC: 3.9 MMOL/L (ref 3.5–5.1)
POTASSIUM SERPL-SCNC: 5.4 MMOL/L (ref 3.5–5.1)
PROCALCITONIN SERPL IA-MCNC: 0.08 NG/ML
PROT SERPL-MCNC: 6.2 G/DL (ref 6–8.4)
RBC # BLD AUTO: 2.58 M/UL (ref 4–5.4)
RBC # BLD AUTO: 2.61 M/UL (ref 4–5.4)
SAMPLE: ABNORMAL
SITE: ABNORMAL
SODIUM SERPL-SCNC: 131 MMOL/L (ref 136–145)
SODIUM SERPL-SCNC: 131 MMOL/L (ref 136–145)
SP02: 100
TROPONIN I SERPL DL<=0.01 NG/ML-MCNC: <0.006 NG/ML (ref 0–0.03)
WBC # BLD AUTO: 13 K/UL (ref 3.9–12.7)
WBC # BLD AUTO: 9.67 K/UL (ref 3.9–12.7)

## 2023-02-13 PROCEDURE — 63600175 PHARM REV CODE 636 W HCPCS: Performed by: EMERGENCY MEDICINE

## 2023-02-13 PROCEDURE — 27000221 HC OXYGEN, UP TO 24 HOURS

## 2023-02-13 PROCEDURE — 82803 BLOOD GASES ANY COMBINATION: CPT

## 2023-02-13 PROCEDURE — 99291 PR CRITICAL CARE, E/M 30-74 MINUTES: ICD-10-PCS | Mod: GC,,, | Performed by: INTERNAL MEDICINE

## 2023-02-13 PROCEDURE — 82232 ASSAY OF BETA-2 PROTEIN: CPT | Performed by: SURGERY

## 2023-02-13 PROCEDURE — 20000000 HC ICU ROOM

## 2023-02-13 PROCEDURE — 93010 ELECTROCARDIOGRAM REPORT: CPT | Mod: ,,, | Performed by: INTERNAL MEDICINE

## 2023-02-13 PROCEDURE — 99291 CRITICAL CARE FIRST HOUR: CPT | Mod: 25

## 2023-02-13 PROCEDURE — 63600175 PHARM REV CODE 636 W HCPCS: Performed by: NURSE PRACTITIONER

## 2023-02-13 PROCEDURE — 25000242 PHARM REV CODE 250 ALT 637 W/ HCPCS: Performed by: EMERGENCY MEDICINE

## 2023-02-13 PROCEDURE — 25000003 PHARM REV CODE 250: Performed by: NURSE PRACTITIONER

## 2023-02-13 PROCEDURE — 63600175 PHARM REV CODE 636 W HCPCS

## 2023-02-13 PROCEDURE — 25000003 PHARM REV CODE 250: Performed by: EMERGENCY MEDICINE

## 2023-02-13 PROCEDURE — 85025 COMPLETE CBC W/AUTO DIFF WBC: CPT | Performed by: EMERGENCY MEDICINE

## 2023-02-13 PROCEDURE — 94640 AIRWAY INHALATION TREATMENT: CPT

## 2023-02-13 PROCEDURE — 96365 THER/PROPH/DIAG IV INF INIT: CPT

## 2023-02-13 PROCEDURE — 94761 N-INVAS EAR/PLS OXIMETRY MLT: CPT

## 2023-02-13 PROCEDURE — 94644 CONT INHLJ TX 1ST HOUR: CPT

## 2023-02-13 PROCEDURE — 99223 1ST HOSP IP/OBS HIGH 75: CPT | Mod: ,,, | Performed by: STUDENT IN AN ORGANIZED HEALTH CARE EDUCATION/TRAINING PROGRAM

## 2023-02-13 PROCEDURE — 83605 ASSAY OF LACTIC ACID: CPT | Performed by: HOSPITALIST

## 2023-02-13 PROCEDURE — 27000190 HC CPAP FULL FACE MASK W/VALVE

## 2023-02-13 PROCEDURE — 36415 COLL VENOUS BLD VENIPUNCTURE: CPT | Performed by: NURSE PRACTITIONER

## 2023-02-13 PROCEDURE — 96375 TX/PRO/DX INJ NEW DRUG ADDON: CPT

## 2023-02-13 PROCEDURE — 36600 WITHDRAWAL OF ARTERIAL BLOOD: CPT

## 2023-02-13 PROCEDURE — 83036 HEMOGLOBIN GLYCOSYLATED A1C: CPT | Performed by: NURSE PRACTITIONER

## 2023-02-13 PROCEDURE — 94660 CPAP INITIATION&MGMT: CPT

## 2023-02-13 PROCEDURE — 99223 PR INITIAL HOSPITAL CARE,LEVL III: ICD-10-PCS | Mod: AI,,, | Performed by: HOSPITALIST

## 2023-02-13 PROCEDURE — 27000207 HC ISOLATION

## 2023-02-13 PROCEDURE — 36415 COLL VENOUS BLD VENIPUNCTURE: CPT | Performed by: EMERGENCY MEDICINE

## 2023-02-13 PROCEDURE — 80053 COMPREHEN METABOLIC PANEL: CPT | Performed by: EMERGENCY MEDICINE

## 2023-02-13 PROCEDURE — 93005 ELECTROCARDIOGRAM TRACING: CPT

## 2023-02-13 PROCEDURE — 84145 PROCALCITONIN (PCT): CPT | Performed by: HOSPITALIST

## 2023-02-13 PROCEDURE — 25000242 PHARM REV CODE 250 ALT 637 W/ HCPCS: Performed by: NURSE PRACTITIONER

## 2023-02-13 PROCEDURE — 80048 BASIC METABOLIC PNL TOTAL CA: CPT | Mod: XB | Performed by: HOSPITALIST

## 2023-02-13 PROCEDURE — 85025 COMPLETE CBC W/AUTO DIFF WBC: CPT | Mod: 91 | Performed by: HOSPITALIST

## 2023-02-13 PROCEDURE — 99900035 HC TECH TIME PER 15 MIN (STAT)

## 2023-02-13 PROCEDURE — 93010 EKG 12-LEAD: ICD-10-PCS | Mod: ,,, | Performed by: INTERNAL MEDICINE

## 2023-02-13 PROCEDURE — 25000003 PHARM REV CODE 250: Performed by: HOSPITALIST

## 2023-02-13 PROCEDURE — 99223 PR INITIAL HOSPITAL CARE,LEVL III: ICD-10-PCS | Mod: ,,, | Performed by: STUDENT IN AN ORGANIZED HEALTH CARE EDUCATION/TRAINING PROGRAM

## 2023-02-13 PROCEDURE — 83735 ASSAY OF MAGNESIUM: CPT | Performed by: HOSPITALIST

## 2023-02-13 PROCEDURE — 99291 CRITICAL CARE FIRST HOUR: CPT | Mod: GC,,, | Performed by: INTERNAL MEDICINE

## 2023-02-13 PROCEDURE — 83880 ASSAY OF NATRIURETIC PEPTIDE: CPT | Performed by: EMERGENCY MEDICINE

## 2023-02-13 PROCEDURE — 84100 ASSAY OF PHOSPHORUS: CPT | Performed by: HOSPITALIST

## 2023-02-13 PROCEDURE — 63600175 PHARM REV CODE 636 W HCPCS: Performed by: INTERNAL MEDICINE

## 2023-02-13 PROCEDURE — 99223 1ST HOSP IP/OBS HIGH 75: CPT | Mod: AI,,, | Performed by: HOSPITALIST

## 2023-02-13 PROCEDURE — 36415 COLL VENOUS BLD VENIPUNCTURE: CPT | Performed by: SURGERY

## 2023-02-13 PROCEDURE — 84484 ASSAY OF TROPONIN QUANT: CPT | Performed by: EMERGENCY MEDICINE

## 2023-02-13 RX ORDER — ACETAMINOPHEN 500 MG
1000 TABLET ORAL EVERY 8 HOURS PRN
Status: DISCONTINUED | OUTPATIENT
Start: 2023-02-13 | End: 2023-02-20 | Stop reason: HOSPADM

## 2023-02-13 RX ORDER — METOPROLOL SUCCINATE 50 MG/1
50 TABLET, EXTENDED RELEASE ORAL DAILY
Status: DISCONTINUED | OUTPATIENT
Start: 2023-02-13 | End: 2023-02-20 | Stop reason: HOSPADM

## 2023-02-13 RX ORDER — LORAZEPAM 2 MG/ML
1 INJECTION INTRAMUSCULAR EVERY 4 HOURS PRN
Status: DISCONTINUED | OUTPATIENT
Start: 2023-02-13 | End: 2023-02-13

## 2023-02-13 RX ORDER — ONDANSETRON 2 MG/ML
8 INJECTION INTRAMUSCULAR; INTRAVENOUS EVERY 8 HOURS PRN
Status: DISCONTINUED | OUTPATIENT
Start: 2023-02-13 | End: 2023-02-20 | Stop reason: HOSPADM

## 2023-02-13 RX ORDER — GABAPENTIN 300 MG/1
300 CAPSULE ORAL 3 TIMES DAILY
Status: DISCONTINUED | OUTPATIENT
Start: 2023-02-13 | End: 2023-02-20 | Stop reason: HOSPADM

## 2023-02-13 RX ORDER — PANTOPRAZOLE SODIUM 40 MG/1
40 TABLET, DELAYED RELEASE ORAL DAILY
Status: DISCONTINUED | OUTPATIENT
Start: 2023-02-13 | End: 2023-02-20 | Stop reason: HOSPADM

## 2023-02-13 RX ORDER — OXYCODONE HYDROCHLORIDE 5 MG/1
5 TABLET ORAL EVERY 4 HOURS PRN
Status: DISCONTINUED | OUTPATIENT
Start: 2023-02-13 | End: 2023-02-20 | Stop reason: HOSPADM

## 2023-02-13 RX ORDER — SODIUM CHLORIDE 0.9 % (FLUSH) 0.9 %
10 SYRINGE (ML) INJECTION
Status: DISCONTINUED | OUTPATIENT
Start: 2023-02-13 | End: 2023-02-20 | Stop reason: HOSPADM

## 2023-02-13 RX ORDER — ASCORBIC ACID 250 MG
250 TABLET ORAL DAILY
Status: DISCONTINUED | OUTPATIENT
Start: 2023-02-13 | End: 2023-02-20 | Stop reason: HOSPADM

## 2023-02-13 RX ORDER — IPRATROPIUM BROMIDE AND ALBUTEROL SULFATE 2.5; .5 MG/3ML; MG/3ML
3 SOLUTION RESPIRATORY (INHALATION)
Status: COMPLETED | OUTPATIENT
Start: 2023-02-13 | End: 2023-02-13

## 2023-02-13 RX ORDER — ATORVASTATIN CALCIUM 20 MG/1
40 TABLET, FILM COATED ORAL DAILY
Status: DISCONTINUED | OUTPATIENT
Start: 2023-02-13 | End: 2023-02-20 | Stop reason: HOSPADM

## 2023-02-13 RX ORDER — METHYLPREDNISOLONE SOD SUCC 125 MG
125 VIAL (EA) INJECTION
Status: COMPLETED | OUTPATIENT
Start: 2023-02-13 | End: 2023-02-13

## 2023-02-13 RX ORDER — FUROSEMIDE 10 MG/ML
60 INJECTION INTRAMUSCULAR; INTRAVENOUS EVERY 8 HOURS
Status: DISCONTINUED | OUTPATIENT
Start: 2023-02-13 | End: 2023-02-14

## 2023-02-13 RX ORDER — IPRATROPIUM BROMIDE AND ALBUTEROL SULFATE 2.5; .5 MG/3ML; MG/3ML
3 SOLUTION RESPIRATORY (INHALATION) EVERY 4 HOURS PRN
Status: DISCONTINUED | OUTPATIENT
Start: 2023-02-13 | End: 2023-02-15

## 2023-02-13 RX ORDER — HYDROMORPHONE HYDROCHLORIDE 1 MG/ML
0.5 INJECTION, SOLUTION INTRAMUSCULAR; INTRAVENOUS; SUBCUTANEOUS EVERY 4 HOURS PRN
Status: DISCONTINUED | OUTPATIENT
Start: 2023-02-13 | End: 2023-02-18 | Stop reason: RX

## 2023-02-13 RX ORDER — FLUTICASONE FUROATE AND VILANTEROL 100; 25 UG/1; UG/1
1 POWDER RESPIRATORY (INHALATION) DAILY
Status: DISCONTINUED | OUTPATIENT
Start: 2023-02-13 | End: 2023-02-20 | Stop reason: HOSPADM

## 2023-02-13 RX ORDER — LANOLIN ALCOHOL/MO/W.PET/CERES
400 CREAM (GRAM) TOPICAL 2 TIMES DAILY
Status: DISCONTINUED | OUTPATIENT
Start: 2023-02-13 | End: 2023-02-20 | Stop reason: HOSPADM

## 2023-02-13 RX ORDER — LOSARTAN POTASSIUM 50 MG/1
100 TABLET ORAL DAILY
Status: DISCONTINUED | OUTPATIENT
Start: 2023-02-13 | End: 2023-02-20 | Stop reason: HOSPADM

## 2023-02-13 RX ORDER — MAGNESIUM SULFATE HEPTAHYDRATE 40 MG/ML
2 INJECTION, SOLUTION INTRAVENOUS
Status: COMPLETED | OUTPATIENT
Start: 2023-02-13 | End: 2023-02-13

## 2023-02-13 RX ORDER — HEPARIN SODIUM 5000 [USP'U]/ML
5000 INJECTION, SOLUTION INTRAVENOUS; SUBCUTANEOUS EVERY 8 HOURS
Status: DISCONTINUED | OUTPATIENT
Start: 2023-02-13 | End: 2023-02-20 | Stop reason: HOSPADM

## 2023-02-13 RX ORDER — DULOXETIN HYDROCHLORIDE 30 MG/1
60 CAPSULE, DELAYED RELEASE ORAL DAILY
Status: DISCONTINUED | OUTPATIENT
Start: 2023-02-13 | End: 2023-02-20 | Stop reason: HOSPADM

## 2023-02-13 RX ORDER — CHOLECALCIFEROL (VITAMIN D3) 25 MCG
1000 TABLET ORAL DAILY
Status: DISCONTINUED | OUTPATIENT
Start: 2023-02-13 | End: 2023-02-20 | Stop reason: HOSPADM

## 2023-02-13 RX ORDER — MUPIROCIN 20 MG/G
OINTMENT TOPICAL 2 TIMES DAILY
Status: DISPENSED | OUTPATIENT
Start: 2023-02-13 | End: 2023-02-18

## 2023-02-13 RX ORDER — MAGNESIUM SULFATE HEPTAHYDRATE 40 MG/ML
INJECTION, SOLUTION INTRAVENOUS
Status: COMPLETED
Start: 2023-02-13 | End: 2023-02-13

## 2023-02-13 RX ADMIN — DULOXETINE 60 MG: 30 CAPSULE, DELAYED RELEASE ORAL at 08:02

## 2023-02-13 RX ADMIN — Medication 400 MG: at 09:02

## 2023-02-13 RX ADMIN — HEPARIN SODIUM 5000 UNITS: 5000 INJECTION INTRAVENOUS; SUBCUTANEOUS at 06:02

## 2023-02-13 RX ADMIN — ACETAMINOPHEN 1000 MG: 500 TABLET, FILM COATED ORAL at 11:02

## 2023-02-13 RX ADMIN — FUROSEMIDE 60 MG: 10 INJECTION, SOLUTION INTRAMUSCULAR; INTRAVENOUS at 09:02

## 2023-02-13 RX ADMIN — MAGNESIUM SULFATE HEPTAHYDRATE 2 G: 40 INJECTION, SOLUTION INTRAVENOUS at 11:02

## 2023-02-13 RX ADMIN — HEPARIN SODIUM 5000 UNITS: 5000 INJECTION INTRAVENOUS; SUBCUTANEOUS at 01:02

## 2023-02-13 RX ADMIN — Medication 1000 UNITS: at 08:02

## 2023-02-13 RX ADMIN — FLUTICASONE FUROATE AND VILANTEROL TRIFENATATE 1 PUFF: 100; 25 POWDER RESPIRATORY (INHALATION) at 07:02

## 2023-02-13 RX ADMIN — GABAPENTIN 300 MG: 300 CAPSULE ORAL at 08:02

## 2023-02-13 RX ADMIN — METHYLPREDNISOLONE SODIUM SUCCINATE 125 MG: 125 INJECTION, POWDER, FOR SOLUTION INTRAMUSCULAR; INTRAVENOUS at 01:02

## 2023-02-13 RX ADMIN — LOSARTAN POTASSIUM 100 MG: 50 TABLET, FILM COATED ORAL at 08:02

## 2023-02-13 RX ADMIN — THERA TABS 1 TABLET: TAB at 08:02

## 2023-02-13 RX ADMIN — GABAPENTIN 300 MG: 300 CAPSULE ORAL at 02:02

## 2023-02-13 RX ADMIN — IPRATROPIUM BROMIDE AND ALBUTEROL SULFATE 3 ML: 2.5; .5 SOLUTION RESPIRATORY (INHALATION) at 01:02

## 2023-02-13 RX ADMIN — FUROSEMIDE 60 MG: 10 INJECTION, SOLUTION INTRAMUSCULAR; INTRAVENOUS at 06:02

## 2023-02-13 RX ADMIN — OXYCODONE HYDROCHLORIDE 5 MG: 5 TABLET ORAL at 09:02

## 2023-02-13 RX ADMIN — MUPIROCIN: 20 OINTMENT TOPICAL at 09:02

## 2023-02-13 RX ADMIN — FUROSEMIDE 60 MG: 10 INJECTION, SOLUTION INTRAMUSCULAR; INTRAVENOUS at 01:02

## 2023-02-13 RX ADMIN — CEFTRIAXONE 1 G: 1 INJECTION, POWDER, FOR SOLUTION INTRAMUSCULAR; INTRAVENOUS at 02:02

## 2023-02-13 RX ADMIN — OXYCODONE HYDROCHLORIDE 5 MG: 5 TABLET ORAL at 07:02

## 2023-02-13 RX ADMIN — IPRATROPIUM BROMIDE AND ALBUTEROL SULFATE 3 ML: .5; 3 SOLUTION RESPIRATORY (INHALATION) at 09:02

## 2023-02-13 RX ADMIN — IPRATROPIUM BROMIDE AND ALBUTEROL SULFATE 3 ML: .5; 3 SOLUTION RESPIRATORY (INHALATION) at 07:02

## 2023-02-13 RX ADMIN — ATORVASTATIN CALCIUM 40 MG: 20 TABLET, FILM COATED ORAL at 08:02

## 2023-02-13 RX ADMIN — PANTOPRAZOLE SODIUM 40 MG: 40 TABLET, DELAYED RELEASE ORAL at 08:02

## 2023-02-13 RX ADMIN — AZITHROMYCIN MONOHYDRATE 500 MG: 500 INJECTION, POWDER, LYOPHILIZED, FOR SOLUTION INTRAVENOUS at 03:02

## 2023-02-13 RX ADMIN — Medication 250 MG: at 08:02

## 2023-02-13 RX ADMIN — HEPARIN SODIUM 5000 UNITS: 5000 INJECTION INTRAVENOUS; SUBCUTANEOUS at 09:02

## 2023-02-13 RX ADMIN — MAGNESIUM SULFATE HEPTAHYDRATE 2 G: 40 INJECTION, SOLUTION INTRAVENOUS at 09:02

## 2023-02-13 RX ADMIN — METOPROLOL SUCCINATE 50 MG: 50 TABLET, EXTENDED RELEASE ORAL at 08:02

## 2023-02-13 RX ADMIN — GABAPENTIN 300 MG: 300 CAPSULE ORAL at 09:02

## 2023-02-13 NOTE — NURSING
Nurses Note -- 4 Eyes      2/13/2023   6:01 AM      Skin assessed during: Admit      [] No Pressure Injuries Present    []Prevention Measures Documented      [x] Yes- Altered Skin Integrity Present or Discovered   [x] LDA Added if Not in Epic (Describe Wound)   [x] New Altered Skin Integrity was Present on Admit and Documented in LDA   [] Wound Image Taken    Wound Care Consulted? No. To be consulted on day shift.     Attending Nurse:  Cristal BARRON    Second RN/Staff Member:  Shyanne BARRON

## 2023-02-13 NOTE — EICU
EICU BRIEF INITIAL EVALUATION:       HISTORY:      72-year-old woman admitted to ICU for continuous BiPAP for acute on chronic hypoxemic, hypercapnic respiratory failure.  Also complaining of diarrhea  History of hyperlipidemia, CKD 3, hypertension, chronic diastolic heart failure, recurrent adenocarcinoma of left lung, COPD, opioid dependence, anemia, tobacco dependence, anxiety, diverticulitis, L3 compression fracture, GI bleeding, alcohol abuse, choledocholithiasis, urinary retention, cervical spinal stenosis, thyroid nodules, PTSD, atherosclerosis, SIADH, none thrombocytopenic purpura, pleural effusion    DVT prophylaxis heparin, SCDs  GI prophylaxis PPI     Chest x-ray with small bilateral pleural effusions, alveolar/interstitial opacities consistent with fluid overload but greater in lower lung fields   EKG sinus tachycardia, inferior T-wave inversions, poor R-wave progression present since at least January    /76, P 126, R 22, O2 sat 99   Resting comfortably on BiPAP 10/5, rate 10, 35%.  Tidal volume about 650      CAMERA ASSESSMENT: Yes      TELEMETRY: Reviewed      NOTES: Reviewed     LABS: Reviewed      IMAGING: Personally reviewed.      DISCUSSED with bedside nurse        ASSESSMENT AND PLAN:       Acute on chronic hypoxemic, hypercapnic respiratory failure-CHF versus COPD exacerbation versus pneumonia.  Continue BiPAP-taper as tolerated.  Continue diuresis, respiratory treatments,  antibiotics.  Titrate FiO2 to O2 sat 92-94    Hyperkalemia-no peaked T-waves, occasional PVCs.  Repeat pending      I have reviewed the plan of care for the patient and agree with the plan of care unless noted otherwise above.      ANDREW Spencer MD  eICU Attending  [phone]

## 2023-02-13 NOTE — SUBJECTIVE & OBJECTIVE
Past Medical History:   Diagnosis Date    Anxiety     Cervical spinal stenosis     Choledocholithiasis     Chronic obstructive pulmonary disease 03/16/2016    Degenerative disc disease of cervical spine     Diverticulosis 04/24/2018    History of alcohol abuse 03/02/2021    History of gastric ulcer     History of L3 compression fracture 12/19/2018    History of lung cancer     s/p completion of XRT in 10/2020    Hyperlipidemia     Iron deficiency anemia     Osteoarthritis     Stage III CKD 2017       Past Surgical History:   Procedure Laterality Date    BREAST CYST EXCISION Right     1967    CATARACT EXTRACTION      COLONOSCOPY  2015    COLONOSCOPY N/A 6/8/2022    Procedure: COLONOSCOPY;  Surgeon: Helio Cheema MD;  Location: [facility] ([address] [place] [address]);  Service: Endoscopy;  Laterality: N/A;  5/25-Pt requesting Dr. Cheema-approval given per Dr. Cheema-ml  Fully vaccinated, prep instr portal -ml    CORONARY ANGIOGRAPHY N/A 7/20/2018    Procedure: ANGIOGRAM, CORONARY ARTERY;  Surgeon: Willard Roberts MD;  Location: [facility] CATH LAB;  Service: Cardiovascular;  Laterality: N/A;    ENDOSCOPIC ULTRASOUND OF UPPER GASTROINTESTINAL TRACT N/A 3/24/2021    Procedure: ULTRASOUND, UPPER GI TRACT, ENDOSCOPIC;  Surgeon: Helio Cheema MD;  Location: [address]);  Service: Endoscopy;  Laterality: N/A;    ENDOSCOPIC ULTRASOUND OF UPPER GASTROINTESTINAL TRACT N/A 4/25/2022    Procedure: ULTRASOUND, UPPER GI TRACT, ENDOSCOPIC;  Surgeon: Helio Cheema MD;  [address]  Service: Endoscopy;  Laterality: N/A;  cardiac risk assessment 1 per Dr. Ortez. see t/e 3/17-SC  3/25:new instructions via portal. home with medical transport?-SC    ERCP N/A 3/24/2021    Procedure: ERCP (ENDOSCOPIC RETROGRADE CHOLANGIOPANCREATOGRAPHY);  Surgeon: Helio Cheema MD;  [facility]  Service: Endoscopy;  Laterality: N/A;    ERCP N/A 4/30/2021    Procedure: ERCP (ENDOSCOPIC RETROGRADE  CHOLANGIOPANCREATOGRAPHY);  Surgeon: Boo Gray MD;  Location: [facility] ENDO (2ND FLR);  Service: Endoscopy;  Laterality: N/A;    ERCP N/A 7/1/2021    Procedure: ERCP (ENDOSCOPIC RETROGRADE CHOLANGIOPANCREATOGRAPHY);  Surgeon: Helio Cheema MD;  Location: [facility] ENDO (2ND FLR);  Service: Endoscopy;  Laterality: N/A;  Ok for Taxi. Dr Cheema  rapid covid 1130am- tb inst email    ESOPHAGOGASTRODUODENOSCOPY  2015    ESOPHAGOGASTRODUODENOSCOPY N/A 3/4/2021    Procedure: EGD (ESOPHAGOGASTRODUODENOSCOPY);  Surgeon: Yenifer Ramirez MD;  Location: [facility];  Service: Endoscopy;  Laterality: N/A;    ESOPHAGOGASTRODUODENOSCOPY N/A 3/24/2021    Procedure: EGD (ESOPHAGOGASTRODUODENOSCOPY);  Surgeon: Helio Cheema MD;  Location: [facility] (2ND FLR);  Service: Endoscopy;  Laterality: N/A;    EYE SURGERY  2016    Cataracts    FRACTURE SURGERY  2014    JOINT REPLACEMENT  2007    ORIF FEMUR FRACTURE Left     TOTAL KNEE ARTHROPLASTY Left 2007    TUBAL LIGATION         Review of patient's allergies indicates:   Allergen Reactions    Wellbutrin [bupropion hcl] Other (See Comments)     Hyponatremia and hypomagnesia     Zoloft [sertraline] Other (See Comments)     Hyponatremia and hypomagnesia     Bananas [banana]      Emesis, and stomach cramps    Patient states she is not allergic to Banana       No current facility-administered medications on file prior to encounter.     Current Outpatient Medications on File Prior to Encounter   Medication Sig    albuterol (VENTOLIN HFA) 90 mcg/actuation inhaler inhale 1-2 puffs as needed every 6 hours for wheezing and shortness of breath    ALPRAZolam (XANAX) 0.25 MG tablet Take 1 tablet (0.25 mg total) by mouth daily as needed for Anxiety.    ascorbic acid, vitamin C, (VITAMIN C) 250 MG tablet Take 250 mg by mouth once daily.    atorvastatin (LIPITOR) 40 MG tablet TAKE 1 TABLET BY MOUTH EVERY DAILY    azelastine (ASTELIN) 137 mcg (0.1 %) nasal spray Instill 1 spray (137 mcg total) by  Nasal route 2 (two) times daily.    b complex vitamins capsule Take 1 capsule by mouth once daily.    biotin 10 mg Tab Take 10 mg by mouth once daily.    calcium carbonate (OS-SANDRINE) 500 mg calcium (1,250 mg) tablet Take 2 tablets (1,000 mg total) by mouth 2 (two) times daily.    cyanocobalamin, vitamin B-12, 1,000 mcg TbSR Take 1,000 mcg by mouth once daily.    denosumab (PROLIA) 60 mg/mL Syrg Inject 1 mL (60 mg total) into the skin every 6 (six) months.    DULoxetine (CYMBALTA) 60 MG capsule Take 1 capsule (60 mg total) by mouth once daily.    fluticasone (FLONASE) 50 mcg/actuation nasal spray 1 spray by Each Nare route 2 (two) times daily as needed for Rhinitis.    fluticasone propion-salmeterol 115-21 mcg/dose (ADVAIR HFA) 115-21 mcg/actuation HFAA inhaler Inhale 2 puffs into the lungs every 12 (twelve) hours.    gabapentin (NEURONTIN) 300 MG capsule Take 1 capsule (300 mg total) by mouth 3 (three) times daily.    guaiFENesin (MUCINEX) 600 mg 12 hr tablet Take 1,200 mg by mouth 2 (two) times daily as needed for Congestion.    HYDROcodone-acetaminophen (NORCO)  mg per tablet Take 1 tablet by mouth every 12 (twelve) hours as needed for Pain.    losartan (COZAAR) 100 MG tablet Take 1 tablet (100 mg total) by mouth once daily. HOLD UNTIL YOU SEE YOUR PCP    lutein 40 mg Cap Take by mouth.    magnesium oxide (MAG-OX) 400 mg (241.3 mg magnesium) tablet Take 1 tablet by mouth every 12 (twelve) hours.    metoprolol succinate (TOPROL-XL) 25 MG 24 hr tablet Take 2 tablets (50 mg total) by mouth once daily.    multivit-min/iron/folic/lutein (CENTRUM SILVER WOMEN ORAL) Take 1 tablet by mouth once daily.    ondansetron (ZOFRAN-ODT) 8 MG TbDL dissolve 1 tablet (8 mg total) by mouth every 8 (eight) hours as needed (nausea).    pantoprazole (PROTONIX) 40 MG tablet Take 1 tablet (40 mg total) by mouth once daily.    promethazine (PHENERGAN) 25 MG tablet Take 1 tablet (25 mg total) by mouth every 6 (six) hours as needed for  Nausea.    torsemide (DEMADEX) 20 MG Tab Take 1 tablet (20 mg total) by mouth once daily.    traZODone (DESYREL) 100 MG tablet Take 1 tablet (100 mg total) by mouth every evening.    umeclidinium (INCRUSE ELLIPTA) 62.5 mcg/actuation inhalation capsule Inhale 1 puff into the lungs once daily. Controller    vitamin D (VITAMIN D3) 1000 units Tab Take 1 tablet (1,000 Units total) by mouth once daily.    ZINC ORAL Take by mouth.     Family History       Problem Relation (Age of Onset)    Alzheimer's disease Mother    Arthritis Father, Brother    Cancer Sister    Colon cancer Brother    Dementia Mother    Depression Mother    Hypertension Mother, Brother    Miscarriages / Stillbirths Sister    Myasthenia gravis Father    No Known Problems Son    Rectal cancer Father          Tobacco Use    Smoking status: Some Days     Packs/day: 1.00     Years: 53.00     Pack years: 53.00     Types: Cigarettes     Start date: 4/30/1967    Smokeless tobacco: Never    Tobacco comments:     I had quit for about 6 months this past year. Then massive stress and started back up sometimes   Substance and Sexual Activity    Alcohol use: Yes     Alcohol/week: 7.0 standard drinks     Types: 7 Drinks containing 0.5 oz of alcohol per week     Comment: at least 1 cocktail a day    Drug use: No    Sexual activity: Not Currently     Partners: Male     Birth control/protection: Abstinence, Post-menopausal     Review of Systems   Constitutional:  Positive for activity change, appetite change and fatigue. Negative for fever.   HENT:  Negative for congestion, ear pain, rhinorrhea and sinus pressure.    Eyes:  Negative for pain and discharge.   Respiratory:  Negative for cough, chest tightness, shortness of breath and wheezing.    Cardiovascular:  Positive for leg swelling. Negative for chest pain.   Gastrointestinal:  Positive for diarrhea. Negative for abdominal distention, abdominal pain, nausea and vomiting.   Endocrine: Negative for cold intolerance  and heat intolerance.   Genitourinary:  Negative for difficulty urinating, flank pain, frequency, hematuria and urgency.   Musculoskeletal:  Negative for arthralgias, joint swelling and myalgias.   Allergic/Immunologic: Negative for environmental allergies and food allergies.   Neurological:  Negative for dizziness, weakness, light-headedness and headaches.   Hematological:  Does not bruise/bleed easily.   Psychiatric/Behavioral:  Negative for agitation, behavioral problems and decreased concentration.    Objective:     Vital Signs (Most Recent):  Temp: 98.9 °F (37.2 °C) (02/13/23 0140)  Pulse: (!) 123 (02/13/23 0549)  Resp: (!) 27 (02/13/23 0549)  BP: 136/80 (02/13/23 0549)  SpO2: 100 % (02/13/23 0549)   Vital Signs (24h Range):  Temp:  [98.9 °F (37.2 °C)] 98.9 °F (37.2 °C)  Pulse:  [100-128] 123  Resp:  [20-27] 27  SpO2:  [100 %] 100 %  BP: (106-136)/(58-80) 136/80     Weight: 99.8 kg (220 lb)  Body mass index is 35.51 kg/m².    Physical Exam  Constitutional:       Appearance: She is well-developed. She is ill-appearing.   HENT:      Head: Normocephalic.   Eyes:      General:         Right eye: No discharge.         Left eye: No discharge.      Conjunctiva/sclera: Conjunctivae normal.   Cardiovascular:      Rate and Rhythm: Tachycardia present. Rhythm regularly irregular.      Pulses:           Radial pulses are 2+ on the right side and 2+ on the left side.      Heart sounds: Normal heart sounds.   Pulmonary:      Effort: Pulmonary effort is normal. Tachypnea present. No respiratory distress.      Breath sounds: Examination of the right-lower field reveals rales. Examination of the left-lower field reveals rales. Rales present.   Abdominal:      General: Bowel sounds are decreased. There is no distension.      Palpations: Abdomen is soft.      Tenderness: There is no abdominal tenderness.   Musculoskeletal:         General: Normal range of motion.      Cervical back: Normal range of motion and neck supple.       Right lower le+ Pitting Edema present.      Left lower le+ Pitting Edema present.   Skin:     General: Skin is warm and dry.      Coloration: Skin is pale.   Neurological:      Mental Status: She is alert and oriented to person, place, and time.      GCS: GCS eye subscore is 4. GCS verbal subscore is 5. GCS motor subscore is 6.      Motor: Motor function is intact.   Psychiatric:         Mood and Affect: Mood normal.         Speech: Speech normal.         Behavior: Behavior normal.           Significant Labs: All pertinent labs within the past 24 hours have been reviewed.  CBC:   Recent Labs   Lab 23  0159   WBC 13.00*   HGB 8.5*   HCT 25.9*        CMP:   Recent Labs   Lab 23  0113   *   K 5.4*   CL 97   CO2 16*   GLU 66*   BUN 8   CREATININE 0.8   CALCIUM 7.1*   PROT 6.2   ALBUMIN 2.5*   BILITOT 0.5   ALKPHOS 101   AST 38   ALT 12   ANIONGAP 18*       Significant Imaging: I have reviewed all pertinent imaging results/findings within the past 24 hours.

## 2023-02-13 NOTE — ASSESSMENT & PLAN NOTE
BNP- 378, peripheral edema    CXR- Small bilateral pleural effusions are seen, left greater than right.  There are bibasilar opacities and mild increased interstitial attenuation suggestive for mild pulmonary edema, more pronounced within the lower lung zones.  Potential superimposed aspiration or infectious process/developing pneumonia not excluded in the right clinical setting.  No pneumothorax.    Lasix TID  Consider Cardiology consult  Bipap

## 2023-02-13 NOTE — PLAN OF CARE
Initial Discharge Planning Assessment:  Patient admitted on: 2/13/23     Chart reviewed, Care plan discussed with treatment team,  attending Dr. Camacho     PCP updated in Epic: Dr. Sahni  Pharmacy, updated in Epic: Bedside      DME at home: Hospital Bed, Wheelchair,Rollator, 02 concentrator.      Current dispo: Home vs Home health      Transportation: ambulance      Power of  or Living Will: Family      Anticipated DC needs from  perspective:     02/13/23 0944   Discharge Assessment   Assessment Type Discharge Planning Assessment   Confirmed/corrected address, phone number and insurance Yes   Confirmed Demographics Correct on Facesheet   Source of Information patient;health record   People in Home alone   Do you expect to return to your current living situation? Yes   Do you have help at home or someone to help you manage your care at home? No   Who are your caregiver(s) and their phone number(s)? Family   Prior to hospitilization cognitive status: Alert/Oriented   Current cognitive status: Alert/Oriented   Equipment Currently Used at Home walker, standard;wheelchair;hospital bed;rollator   Readmission within 30 days? Yes   Patient currently being followed by outpatient case management? No   Do you currently have service(s) that help you manage your care at home? No   Do you take prescription medications? Yes   Do you have prescription coverage? Yes   Do you have any problems affording any of your prescribed medications? No   Is the patient taking medications as prescribed? yes   How do you get to doctors appointments? family or friend will provide   Are you on dialysis? No   Do you take coumadin? No   Discharge Plan A Home with family   Discharge Plan B Home Health;Skilled Nursing Facility   DME Needed Upon Discharge  none   Discharge Plan discussed with: Patient   Discharge Barriers Identified None

## 2023-02-13 NOTE — HPI
"Per H&P: "The patient is a 72 y.o. female with a past medical history of COPD (on home O2 3L) with 53 pack year smoking hx, current smoker, lung CA, chronic anemia, anxiety, CKD Stage 3, systolic and diastolic heart failure who presents with shortness of breath that started over the last few days.  Also has some diarrhea.  On arrival, oxygen saturations were 50%.  Initially started on nasal cannula with no improvement and was placed on BiPAP.  She also received 1 breathing treatment as well as 0.4 mg of nitroglycerin spray.  On initial workup, the patient's BNP is elevated to 378, some peripheral edema.  She will be admitted for further management of her acute on chronic respiratory failure with hypoxia and acute on chronic systolic heart failure."     Palliative care consulted for assistance with GOC.   "

## 2023-02-13 NOTE — Clinical Note
Diagnosis: Shortness of breath [786.05.ICD-9-CM]   Admitting Provider:: NIKKO BERNARD [[id number]]   Future Attending Provider: NIKKO BERNARD [[id number]]   Reason for IP Medical Treatment  (Clinical interventions that can only be accomplished in the IP setting? ) :: acute resp failure   Estimated Length of Stay:: 3-4 midnights   I certify that Inpatient services for greater than or equal to 2 midnights are medically necessary:: Yes   Plans for Post-Acute care--if anticipated (pick the single best option):: A. No post acute care anticipated at this time

## 2023-02-13 NOTE — HPI
The patient is a 72 y.o. female with a past medical history of COPD (on home O2 3L) with 53 pack year smoking hx, current smoker, lung CA, chronic anemia, anxiety, CKD Stage 3, systolic and diastolic heart failure who presents with shortness of breath that started over the last few days.  Also has some diarrhea.  On arrival, oxygen saturations were 50%.  Initially started on nasal cannula with no improvement and was placed on BiPAP.  She also received 1 breathing treatment as well as 0.4 mg of nitroglycerin spray.  On initial workup, the patient's BNP is elevated to 378, some peripheral edema.  She will be admitted for further management of her acute on chronic respiratory failure with hypoxia and acute on chronic systolic heart failure.

## 2023-02-13 NOTE — SUBJECTIVE & OBJECTIVE
Interval History/subjective:  - patient seen at bedside with palliative RN Amanda who had seen her in a past admission too  - patient open to us coming in and talking with her  - she is currently feeling improved compared to when initially admitted  - breathing is steady and she is glad to not be needing the bipap the was she did when first came in    Past Medical History:   Diagnosis Date    Anxiety     Cervical spinal stenosis     Choledocholithiasis     Chronic obstructive pulmonary disease 03/16/2016    Degenerative disc disease of cervical spine     Diverticulosis 04/24/2018    History of alcohol abuse 03/02/2021    History of gastric ulcer     History of L3 compression fracture 12/19/2018    History of lung cancer     s/p completion of XRT in 10/2020    Hyperlipidemia     Iron deficiency anemia     Osteoarthritis     Stage III CKD 2017       Past Surgical History:   Procedure Laterality Date    BREAST CYST EXCISION Right     1967    CATARACT EXTRACTION      COLONOSCOPY  2015    COLONOSCOPY N/A 6/8/2022    Procedure: COLONOSCOPY;  Surgeon: Helio Cheema MD;  Location: [facility] ([address] [place] [address]);  Service: Endoscopy;  Laterality: N/A;  5/25-Pt requesting Dr. Cheema-approval given per Dr. Cheema-ml  Fully vaccinated, prep instr portal -ml    CORONARY ANGIOGRAPHY N/A 7/20/2018    Procedure: ANGIOGRAM, CORONARY ARTERY;  Surgeon: Willard Roberts MD;  Location: [facility] CATH LAB;  Service: Cardiovascular;  Laterality: N/A;    ENDOSCOPIC ULTRASOUND OF UPPER GASTROINTESTINAL TRACT N/A 3/24/2021    Procedure: ULTRASOUND, UPPER GI TRACT, ENDOSCOPIC;  Surgeon: Helio Cheema MD;  Location: [address]);  Service: Endoscopy;  Laterality: N/A;    ENDOSCOPIC ULTRASOUND OF UPPER GASTROINTESTINAL TRACT N/A 4/25/2022    Procedure: ULTRASOUND, UPPER GI TRACT, ENDOSCOPIC;  Surgeon: Helio Cheema MD;  Location: [address]);  Service: Endoscopy;  Laterality: N/A;  cardiac risk assessment 1 per Dr. Ortez. see  t/e 3/17-SC  3/25:new instructions via portal. home with medical transport?-SC    ERCP N/A 3/24/2021    Procedure: ERCP (ENDOSCOPIC RETROGRADE CHOLANGIOPANCREATOGRAPHY);  Surgeon: Helio Cheema MD;  Location: [facility] (2ND FLR);  Service: Endoscopy;  Laterality: N/A;    ERCP N/A 4/30/2021    Procedure: ERCP (ENDOSCOPIC RETROGRADE CHOLANGIOPANCREATOGRAPHY);  Surgeon: Boo Gray MD;  Location: [facility] ENDO (2ND FLR);  Service: Endoscopy;  Laterality: N/A;    ERCP N/A 7/1/2021    Procedure: ERCP (ENDOSCOPIC RETROGRADE CHOLANGIOPANCREATOGRAPHY);  Surgeon: Helio Cheema MD;  Location: [facility] (2ND FLR);  Service: Endoscopy;  Laterality: N/A;  Ok for Taxi. Dr Cheema  rapid covid 1130am- tb inst email    ESOPHAGOGASTRODUODENOSCOPY  2015    ESOPHAGOGASTRODUODENOSCOPY N/A 3/4/2021    Procedure: EGD (ESOPHAGOGASTRODUODENOSCOPY);  Surgeon: Yenifer Ramirez MD;  Location: [facility];  Service: Endoscopy;  Laterality: N/A;    ESOPHAGOGASTRODUODENOSCOPY N/A 3/24/2021    Procedure: EGD (ESOPHAGOGASTRODUODENOSCOPY);  Surgeon: Helio Cheema MD;  Location: [facility] (2ND FLR);  Service: Endoscopy;  Laterality: N/A;    EYE SURGERY  2016    Cataracts    FRACTURE SURGERY  2014    JOINT REPLACEMENT  2007    ORIF FEMUR FRACTURE Left     TOTAL KNEE ARTHROPLASTY Left 2007    TUBAL LIGATION         Review of patient's allergies indicates:   Allergen Reactions    Wellbutrin [bupropion hcl] Other (See Comments)     Hyponatremia and hypomagnesia     Zoloft [sertraline] Other (See Comments)     Hyponatremia and hypomagnesia     Bananas [banana]      Emesis, and stomach cramps    Patient states she is not allergic to Banana       Medications:  Continuous Infusions:  Scheduled Meds:   ascorbic acid (vitamin C)  250 mg Oral Daily    atorvastatin  40 mg Oral Daily    DULoxetine  60 mg Oral Daily    fluticasone furoate-vilanteroL  1 puff Inhalation Daily    furosemide (LASIX) injection  60 mg Intravenous Q8H    gabapentin  300 mg  Oral TID    heparin (porcine)  5,000 Units Subcutaneous Q8H    losartan  100 mg Oral Daily    magnesium oxide  400 mg Oral BID    metoprolol succinate  50 mg Oral Daily    multivitamin  1 tablet Oral Daily    mupirocin   Nasal BID    pantoprazole  40 mg Oral Daily    vitamin D  1,000 Units Oral Daily     PRN Meds:acetaminophen, albuterol-ipratropium, HYDROmorphone, ondansetron, oxyCODONE, sodium chloride 0.9%    Family History       Problem Relation (Age of Onset)    Alzheimer's disease Mother    Arthritis Father, Brother    Cancer Sister    Colon cancer Brother    Dementia Mother    Depression Mother    Hypertension Mother, Brother    Miscarriages / Stillbirths Sister    Myasthenia gravis Father    No Known Problems Son    Rectal cancer Father          Tobacco Use    Smoking status: Some Days     Packs/day: 1.00     Years: 53.00     Pack years: 53.00     Types: Cigarettes     Start date: 4/30/1967    Smokeless tobacco: Never    Tobacco comments:     I had quit for about 6 months this past year. Then massive stress and started back up sometimes   Substance and Sexual Activity    Alcohol use: Yes     Alcohol/week: 7.0 standard drinks     Types: 7 Drinks containing 0.5 oz of alcohol per week     Comment: at least 1 cocktail a day    Drug use: No    Sexual activity: Not Currently     Partners: Male     Birth control/protection: Abstinence, Post-menopausal     ROS: per hpi/subjective    Objective:     Vital Signs (Most Recent):  Temp: 98.6 °F (37 °C) (02/13/23 0705)  Pulse: 109 (02/13/23 1136)  Resp: 20 (02/13/23 1136)  BP: (!) 147/74 (02/13/23 0949)  SpO2: 99 % (02/13/23 1136)   Vital Signs (24h Range):  Temp:  [98.6 °F (37 °C)-99.4 °F (37.4 °C)] 98.6 °F (37 °C)  Pulse:  [100-149] 109  Resp:  [18-37] 20  SpO2:  [98 %-100 %] 99 %  BP: (106-166)/(58-81) 147/74     Weight: 97.1 kg (214 lb)  Body mass index is 34.54 kg/m².    Physical Exam  Vitals and nursing note reviewed.   Constitutional:       General: She is not in  acute distress.     Appearance: She is obese. She is ill-appearing.   HENT:      Head: Normocephalic and atraumatic.   Eyes:      Extraocular Movements: Extraocular movements intact.   Pulmonary:      Effort: No respiratory distress.      Comments: On NC  Skin:     General: Skin is warm.   Neurological:      Mental Status: She is alert.      Comments: Awake, alert, good insight, conversational   Psychiatric:         Mood and Affect: Mood normal.       Review of Symptoms        Living Arrangements:  Lives alone    Psychosocial/Cultural:   See Palliative Psychosocial Note: Yes  - lives alone at home  - has a friend that comes and checks on her  - in wheelchair   - doesn't do too much, enjoys staying home and watching tv  - 2 sons who live in . [place] is from  [place] but states [place] is too cold for her to go back to now unless she really needed to  **Primary  to Follow**  Palliative Care  Consult: No      Advance Care Planning   Advance Directives:     Decision Making:  Patient answered questions  Goals of Care: The patient endorses that what is most important right now is to focus on remaining as independent as possible and improvement in condition.     Accordingly, we have decided that the best plan to meet the patient's goals includes continuing with treatment       Significant Labs: Reviewed  CBC:   Recent Labs   Lab 02/13/23 0727   WBC 9.67   HGB 8.5*   HCT 26.5*   *        BMP:  Recent Labs   Lab 02/13/23 0727   *   *   K 3.9   CL 96   CO2 18*   BUN 8   CREATININE 0.8   CALCIUM 7.3*   MG <0.7*     LFT:  Lab Results   Component Value Date    AST 38 02/13/2023    ALKPHOS 101 02/13/2023    BILITOT 0.5 02/13/2023     Albumin:   Albumin   Date Value Ref Range Status   02/13/2023 2.5 (L) 3.5 - 5.2 g/dL Final     Protein:   Total Protein   Date Value Ref Range Status   02/13/2023 6.2 6.0 - 8.4 g/dL Final     Lactic acid:   Lab Results   Component Value Date    LACTATE  1.1 02/13/2023    LACTATE 0.7 01/17/2023       Significant Imaging: Reviewed

## 2023-02-13 NOTE — PLAN OF CARE
02/13/23 1206   Medicare Message   Important Message from Medicare regarding Discharge Appeal Rights Given to patient/caregiver;Explained to patient/caregiver;Signed/date by patient/caregiver   Date IMM was signed 02/13/23   Time IMM was signed 1030

## 2023-02-13 NOTE — ASSESSMENT & PLAN NOTE
2/13/2023:  - patient seen at bedside with palliative RN Amanda  - introduced self and re-introduced palliative care role  - patient welcomes us to talk with her  - she has a good overall understanding of her current medical ongoings  - she is glad her breathing is improved from when first came in  - mentioned that the bipap mask can be so uncomfortable and is glad to be stabilized on her nasal cannula again  - at home is wheelchair bound and lives alone, but states she manages well  - she is not interested in a NH or such at this time and said if she ever felt that would be best, she would probably want to go to  for that to be closer to her sons. Not interested in going  sooner because it is too cold otherwise.   - she has friend that comes and helps out at home from time to time  - she states her goals going forward are to get stronger and re-gain as much mobility/independence as possible. She knows that walking again is likely not in the picture due to her L femur injury and not being further surgical candidate per her discussions with ortho in the past. That is why she ended up being mainly wheelchair bound.   - she enjoys her life and feels it is a life worth living. She wants to do what is needed to optimize her condition as much as possible  - discussed code status and she mentioned that is something she wants to think more about and discuss further with her sons too. She is not sure if she would want intubation, but was open to CPR. Made her aware cannot get CPR without intubation but can get intubation without CPR. She stated she is going to think on it more. Let her know there is no rush/pressure and she can ask any further questions she may have  - in terms of HCPOA she would like it to be her oldest son only, but wants to have the paperwork and read it at her leisure then complete it. Currently she has a headache and would like to get tylenol, rest then read through it after  [] made nurse aware of  patients headache and wanting tylenol to help  [] continue with current level of full medical management   [] patients goal is to get stronger and optimize medical condition as much as possible   [] provided with advanced directive paperwork to read over

## 2023-02-13 NOTE — ED PROVIDER NOTES
Source of History:  The paramedics history is limited due to the patient's critical status    Chief complaint:  Shortness of Breath (Pt has progressively gotten worse shortness of breath throughout the night - pt called 911, aasi arrived found the pt with an spo2 in the 50's - aasi gave the pt 1 duo neb treatments, 3 sprays of nitro and placed the pt on cpap - )      HPI:  Nalini Varma is a 72 y.o. female presenting with shortness of breath that started over the last few days.  Also has some diarrhea.  When the paramedics arrived oxygen saturations were 50%.  Initially started on nasal cannula with no improvement and was placed on BiPAP.  She also received 1 breathing treatment as well as 0.4 mg of nitroglycerin spray.  Paramedics state she looks better currently than when she 1st was evaluated at her home.    This is the extent to the patients complaints today here in the emergency department.    ROS:   See HPI.    Review of patient's allergies indicates:   Allergen Reactions    Wellbutrin [bupropion hcl] Other (See Comments)     Hyponatremia and hypomagnesia     Zoloft [sertraline] Other (See Comments)     Hyponatremia and hypomagnesia     Bananas [banana]      Emesis, and stomach cramps    Patient states she is not allergic to Banana       PMH:  As per HPI and below:  Past Medical History:   Diagnosis Date    Anxiety     Cervical spinal stenosis     Choledocholithiasis     Chronic obstructive pulmonary disease 03/16/2016    Degenerative disc disease of cervical spine     Diverticulosis 04/24/2018    History of alcohol abuse 03/02/2021    History of gastric ulcer     History of L3 compression fracture 12/19/2018    History of lung cancer     s/p completion of XRT in 10/2020    Hyperlipidemia     Iron deficiency anemia     Osteoarthritis     Stage III CKD 2017     Past Surgical History:   Procedure Laterality Date    BREAST CYST EXCISION Right     1967    CATARACT EXTRACTION      COLONOSCOPY  2015     COLONOSCOPY N/A 6/8/2022    Procedure: COLONOSCOPY;  Surgeon: Helio Cheema MD;  Location: [facility] (2ND FLR);  Service: Endoscopy;  Laterality: N/A;  5/25-Pt requesting Dr. Cheema-approval given per Dr. Cheema-ml  Fully vaccinated, prep instr portal -ml    CORONARY ANGIOGRAPHY N/A 7/20/2018    Procedure: ANGIOGRAM, CORONARY ARTERY;  Surgeon: Willard Roberts MD;  Location: [facility] CATH LAB;  Service: Cardiovascular;  Laterality: N/A;    ENDOSCOPIC ULTRASOUND OF UPPER GASTROINTESTINAL TRACT N/A 3/24/2021    Procedure: ULTRASOUND, UPPER GI TRACT, ENDOSCOPIC;  Surgeon: Helio Cheema MD;  Location: [facility] ENDO (2ND FLR);  Service: Endoscopy;  Laterality: N/A;    ENDOSCOPIC ULTRASOUND OF UPPER GASTROINTESTINAL TRACT N/A 4/25/2022    Procedure: ULTRASOUND, UPPER GI TRACT, ENDOSCOPIC;  Surgeon: Helio Cheema MD;  Location: [facility] (2ND FLR);  Service: Endoscopy;  Laterality: N/A;  cardiac risk assessment 1 per Dr. Ortez. see t/e 3/17-SC  3/25:new instructions via portal. home with medical transport?-SC    ERCP N/A 3/24/2021    Procedure: ERCP (ENDOSCOPIC RETROGRADE CHOLANGIOPANCREATOGRAPHY);  Surgeon: Helio Chemea MD;  Location: [facility] (2ND FLR);  Service: Endoscopy;  Laterality: N/A;    ERCP N/A 4/30/2021    Procedure: ERCP (ENDOSCOPIC RETROGRADE CHOLANGIOPANCREATOGRAPHY);  Surgeon: Boo Gray MD;  Location: [facility] (2ND FLR);  Service: Endoscopy;  Laterality: N/A;    ERCP N/A 7/1/2021    Procedure: ERCP (ENDOSCOPIC RETROGRADE CHOLANGIOPANCREATOGRAPHY);  Surgeon: Helio Cheema MD;  Location: [facility] ENDO (2ND FLR);  Service: Endoscopy;  Laterality: N/A;  Ok for Taxi. Dr Cheema  rapid covid 1130am- tb inst email    ESOPHAGOGASTRODUODENOSCOPY  2015    ESOPHAGOGASTRODUODENOSCOPY N/A 3/4/2021    Procedure: EGD (ESOPHAGOGASTRODUODENOSCOPY);  Surgeon: Yenifer Ramirez MD;  Location: [facility];  Service: Endoscopy;  Laterality: N/A;    ESOPHAGOGASTRODUODENOSCOPY N/A 3/24/2021    Procedure:  "EGD (ESOPHAGOGASTRODUODENOSCOPY);  Surgeon: Helio Cheema MD;  Location: [facility] ([address] [place] [address]);  Service: Endoscopy;  Laterality: N/A;    EYE SURGERY  2016    Cataracts    FRACTURE SURGERY  2014    JOINT REPLACEMENT  2007    ORIF FEMUR FRACTURE Left     TOTAL KNEE ARTHROPLASTY Left 2007    TUBAL LIGATION         Social History     Tobacco Use    Smoking status: Some Days     Packs/day: 1.00     Years: 53.00     Pack years: 53.00     Types: Cigarettes     Start date: 4/30/1967    Smokeless tobacco: Never    Tobacco comments:     I had quit for about 6 months this past year. Then massive stress and started back up sometimes   Substance Use Topics    Alcohol use: Yes     Alcohol/week: 7.0 standard drinks     Types: 7 Drinks containing 0.5 oz of alcohol per week     Comment: at least 1 cocktail a day    Drug use: No       Physical Exam:    BP (!) 128/58   Pulse (!) 126   Temp 98.9 °F (37.2 °C) (Oral)   Resp (!) 22   Ht 5' 6" (1.676 m)   Wt 99.8 kg (220 lb)   SpO2 100%   BMI 35.51 kg/m²   Nursing note and vital signs reviewed.  Constitutional:  Chronically ill-appearing patient.  Cardiovascular:  Tachycardia, regular rhythm no murmurs gallops or rubs.  Two to 3+ pitting edema bilateral lower extremities.  1+ edema in the upper extremities bilaterally.  Chest/ Respiratory:  Currently on BiPAP.  Tachypnea with mild accessory muscle use.  Bibasilar crackles with end expiratory wheezing mostly in the bases.  Abdomen: Soft.  Not distended.  Nontender.  No guarding.  No rebound. Non-peritoneal.  Extremities: No pitting edema  Neuro: Alert. No focal deficits.  Skin: no rash noted    Critical Care    Date/Time: 2/13/2023 1:16 AM  Performed by: Cornelio Conn DO  Authorized by: Cornelio Conn DO   Direct patient critical care time: 25 minutes  Additional history critical care time: 4 minutes  Ordering / reviewing critical care time: 7 minutes  Documentation critical care time: 5 minutes  Consulting other " physicians critical care time: 5 minutes  Total critical care time (exclusive of procedural time) : 46 minutes         I decided to obtain the patient's medical records.  Summary of Medical Records:  02/02/2023 reviewed office visit with internal medicine which she was seen for iron deficiency anemia due to chronic blood loss.  This is a follow-up after hospital discharge.    01/17/2023-01/24/2023 hospitalization for respiratory failure.  Has history of COPD on home O2 with 3 L.  Fifty-three pack year smoking history.  Current smoker with lung cancer currently on radiation and chemotherapy.  His ejection fraction 55% grade 2 diastolic dysfunction 12/24/2022.  Came into the ER for hypotension and increased shortness of breath 6 days.  Had nonbloody diarrhea.    Reviewing previous EKGs always have been sinus rhythm.    MDM/ Differential Dx:  72-year-old female known COPD on 3 L of oxygen at home presents with shortness of breath.  Also history of CHF.  Differential would include COPD or CHF exacerbation.  Also included but not limited to acute coronary syndrome, sepsis, pneumonia.    ED Course as of 02/13/23 0359   Mon Feb 13, 2023   0127 EKG 12-lead  EKG independently interpreted by myself shows sinus tachycardia rate of 102, normal intervals, narrow QRS, no acute ST T wave abnormalities.  Compared to an EKG 01/17/2023 shows no change. [SM]   0131 X-Ray Chest AP Portable  Chest x-ray independently interpreted by myself shows enlarged cardiac silhouette, increased pulmonary congestion in the bases with a left pleural effusion.  No pneumothorax or bony abnormalities.  Overall impression acute pulmonary edema.  Compared to an x-ray on 01/17/2023 shows persistent left pleural effusion with increased pulmonary congestion in the right middle lobe. [SM]   0144 Pulse: 100 [SM]   0152 Upon re-evaluation patient's respiratory status has improved however will continue with BiPAP. [SM]   0212 WBC(!): 13.00 [SM]   0212  Hemoglobin(!): 8.5 [SM]   0212 Platelets: 223 [SM]   0213  although the patient does not have any fever will go ahead and administer antibiotics to cover for pneumonia until we have further time to evaluate if this is pneumonia superimposed on CHF or just CHF. [SM]   0333 BNP(!): 378 [SM]   0333 Troponin I: <0.006 [SM]   0352 On re-evaluation patient is resting comfortably. Will admit to  [SM]      ED Course User Index  [SM] Cornelio Conn DO               Diagnostic Impression:    1. Acute on chronic respiratory failure with hypoxia    2. Shortness of breath    3. Acute on chronic congestive heart failure, unspecified heart failure type         ED Disposition Condition    Admit Stable                  Cornelio Conn DO  02/13/23 0358

## 2023-02-13 NOTE — CARE UPDATE
02/13/23 0800   Patient Assessment/Suction   Level of Consciousness (AVPU) alert   Respiratory Effort Normal;Unlabored   Expansion/Accessory Muscles/Retractions no retractions;no use of accessory muscles   All Lung Fields Breath Sounds diminished;crackles, fine   JASSI Breath Sounds diminished   LLL Breath Sounds crackles, fine   RUL Breath Sounds diminished   RML Breath Sounds diminished   RLL Breath Sounds crackles, fine   Rhythm/Pattern, Respiratory depth regular;pattern regular;unlabored   Cough Frequency infrequent   Cough Type nonproductive;fair   Skin Integrity   $ Wound Care Tech Time 15 min   Area Observed Left;Right;Behind ear;Cheek;Lower lip;Nares;Neck   Skin Appearance without discoloration   PRE-TX-O2   Device (Oxygen Therapy) BIPAP   Oxygen Concentration (%) 35   SpO2 100 %   Pulse Oximetry Type Continuous   Pulse (!) 123   Resp (!) 24   BP (!) 147/74   Aerosol Therapy   $ Aerosol Therapy Charges Aerosol Treatment   Daily Review of Necessity (SVN) completed   Respiratory Treatment Status (SVN) given   Treatment Route (SVN) in-line   Patient Position (SVN) Obrien's   Post Treatment Assessment (SVN) breath sounds improved   Signs of Intolerance (SVN) none   Inhaler   $ Inhaler Charges MDI (Metered Dose Inahler) Treatment   Daily Review of Necessity (Inhaler) completed   Respiratory Treatment Status (Inhaler) given;mouth rinsed post treatment   Treatment Route (Inhaler) mouthpiece   Patient Position (Inhaler) Obrien's   Post Treatment Assessment (Inhaler) breath sounds unchanged   Signs of Intolerance (Inhaler) none   Ready to Wean/Extubation Screen   FIO2<=50 (chart decimal) 0.35   Preset CPAP/BiPAP Settings   Mode Of Delivery BiPAP S/T   $ Is patient using? Yes   Size of Mask Large   Sized Appropriately? Yes   Equipment Type V60   Ipap 10   EPAP (cm H2O) 5   Pressure Support (cm H2O) 5   Set Rate (Breaths/Min) 10   ITime (sec) 1   Rise Time (sec) 3   Patient CPAP/BiPAP Settings   RR Total (Breaths/Min)  23   Tidal Volume (mL) 380   VE Minute Ventilation (L/min) 906 L/min   Peak Inspiratory Pressure (cm H2O) 19   TiTOT (%) 60   Total Leak (L/Min) 23   Patient Trigger - ST Mode Only (%) 100   Labs   $ Was an ABG obtained? Arterial Puncture;ISTAT - Blood gas   $ Labs Tech Time 15 min

## 2023-02-13 NOTE — CONSULTS
Food & Nutrition  Education    Diet Education: fluid and sodium restriction  Time Spent: 15 min  Learners: patient      Nutrition Education provided with handouts:   + Clinical Reference attachments to d/c documents    Comments:  Patient known to RD from previous admission. Patient has been previously educated on fluid and sodium restricted diet. Discussed the use of at salt at home; patient states she does not use salt when she cooks. Encouraged to continue to limit salt intake and to monitor fluid intake. Patient states she does like soup and doesn't monitor all fluid intake at this time. Patient was drinking a lot of fluids this past week d/t diarrhea. She states she hasn't been able to put shoes on since the end of January. Encouraged patient to purchase a scale to monitor for sudden weight gain. Patient is wheelchair bound and lives alone but states she manages well. Patient voiced understanding.      All questions and concerns answered. Dietitian's contact information provided.       Follow-Up: YES    Please Re-consult as needed    Thanks!  Spring Wilson, Registration Eligible, Provisional LDN

## 2023-02-13 NOTE — CONSULTS
[facility] - Intensive Care ([place])  Palliative Medicine  Consult Note    Patient Name: Nalini Varma  MRN: [id number]  Admission Date: 2/13/2023  Hospital Length of Stay: 0 days  Code Status: Full Code   Attending Provider: Florentino Camacho MD  Consulting Provider: Lucho Gutierrez MD  Primary Care Physician: Yane Sahni MD  Principal Problem:Acute on chronic diastolic heart failure    Patient information was obtained from patient, past medical records and primary team.      Inpatient consult to Palliative Care  Consult performed by: Lucho Gutierrez MD  Consult ordered by: Florentino Camacho MD  Reason for consult: ACP/support        Assessment/Plan:     * Acute on chronic diastolic heart failure  - cardiology consulted  - management per cardiology and primary team    Diarrhea  - on/off issue  - workup ongoing per primary team  - states imodium helps    Advance care planning  2/13/2023:  - patient seen at bedside with palliative RN Amanda  - introduced self and re-introduced palliative care role  - patient welcomes us to talk with her  - she has a good overall understanding of her current medical ongoings  - she is glad her breathing is improved from when first came in  - mentioned that the bipap mask can be so uncomfortable and is glad to be stabilized on her nasal cannula again  - at home is wheelchair bound and lives alone, but states she manages well  - she is not interested in a NH or such at this time and said if she ever felt that would be best, she would probably want to go to  for that to be closer to her sons. Not interested in going  sooner because it is too cold otherwise.   - she has friend that comes and helps out at home from time to time  - she states her goals going forward are to get stronger and re-gain as much mobility/independence as possible. She knows that walking again is likely not in the picture due to her L femur injury and not being further surgical candidate per her discussions  "with ortho in the past. That is why she ended up being mainly wheelchair bound.   - she enjoys her life and feels it is a life worth living. She wants to do what is needed to optimize her condition as much as possible  - discussed code status and she mentioned that is something she wants to think more about and discuss further with her sons too. She is not sure if she would want intubation, but was open to CPR. Made her aware cannot get CPR without intubation but can get intubation without CPR. She stated she is going to think on it more. Let her know there is no rush/pressure and she can ask any further questions she may have  - in terms of HCPOA she would like it to be her oldest son only, but wants to have the paperwork and read it at her leisure then complete it. Currently she has a headache and would like to get tylenol, rest then read through it after  [] made nurse aware of patients headache and wanting tylenol to help  [] continue with current level of full medical management   [] patients goal is to get stronger and optimize medical condition as much as possible   [] provided with advanced directive paperwork to read over    Recurrent adenocarcinoma of left lung  - s/p chemo and radiation  - has plans for further imaging coming up in late fab/early march per patient    CKD (chronic kidney disease) stage 3, GFR 30-59 ml/min  - noted hx. Management per primary team    Severe obesity  - contributing co-morbidity to overall picture    Acute on chronic respiratory failure with hypoxia and hypercapnia  - management per primary team/pulmonology         Thank you for your consult. I will follow-up with patient. Please contact us if you have any additional questions.    Subjective:     HPI:   Per H&P: "The patient is a 72 y.o. female with a past medical history of COPD (on home O2 3L) with 53 pack year smoking hx, current smoker, lung CA, chronic anemia, anxiety, CKD Stage 3, systolic and diastolic heart failure who " "presents with shortness of breath that started over the last few days.  Also has some diarrhea.  On arrival, oxygen saturations were 50%.  Initially started on nasal cannula with no improvement and was placed on BiPAP.  She also received 1 breathing treatment as well as 0.4 mg of nitroglycerin spray.  On initial workup, the patient's BNP is elevated to 378, some peripheral edema.  She will be admitted for further management of her acute on chronic respiratory failure with hypoxia and acute on chronic systolic heart failure."     Palliative care consulted for assistance with GOC.       Hospital Course:  No notes on file    Interval History/subjective:  - patient seen at bedside with palliative RN Amanda who had seen her in a past admission too  - patient open to us coming in and talking with her  - she is currently feeling improved compared to when initially admitted  - breathing is steady and she is glad to not be needing the bipap the was she did when first came in    Past Medical History:   Diagnosis Date    Anxiety     Cervical spinal stenosis     Choledocholithiasis     Chronic obstructive pulmonary disease 03/16/2016    Degenerative disc disease of cervical spine     Diverticulosis 04/24/2018    History of alcohol abuse 03/02/2021    History of gastric ulcer     History of L3 compression fracture 12/19/2018    History of lung cancer     s/p completion of XRT in 10/2020    Hyperlipidemia     Iron deficiency anemia     Osteoarthritis     Stage III CKD 2017       Past Surgical History:   Procedure Laterality Date    BREAST CYST EXCISION Right     1967    CATARACT EXTRACTION      COLONOSCOPY  2015    COLONOSCOPY N/A 6/8/2022    Procedure: COLONOSCOPY;  Surgeon: Helio Cheema MD;  Location: [address]);  Service: Endoscopy;  Laterality: N/A;  5/25-Pt requesting Dr. Cheema-approval given per Dr. Cheema-ml  Fully vaccinated, prep instr portal -ml    CORONARY ANGIOGRAPHY N/A 7/20/2018    " Procedure: ANGIOGRAM, CORONARY ARTERY;  Surgeon: Willard Roberts MD;  Location: [facility] CATH LAB;  Service: Cardiovascular;  Laterality: N/A;    ENDOSCOPIC ULTRASOUND OF UPPER GASTROINTESTINAL TRACT N/A 3/24/2021    Procedure: ULTRASOUND, UPPER GI TRACT, ENDOSCOPIC;  Surgeon: Helio Cheema MD;  Location: [facility] ENDO (2ND FLR);  Service: Endoscopy;  Laterality: N/A;    ENDOSCOPIC ULTRASOUND OF UPPER GASTROINTESTINAL TRACT N/A 4/25/2022    Procedure: ULTRASOUND, UPPER GI TRACT, ENDOSCOPIC;  Surgeon: Helio Cheema MD;  Location: [facility] ENDO (2ND FLR);  Service: Endoscopy;  Laterality: N/A;  cardiac risk assessment 1 per Dr. Ortez. see t/e 3/17-SC  3/25:new instructions via portal. home with medical transport?-SC    ERCP N/A 3/24/2021    Procedure: ERCP (ENDOSCOPIC RETROGRADE CHOLANGIOPANCREATOGRAPHY);  Surgeon: Helio Cheema MD;  Location: [facility] (2ND FLR);  Service: Endoscopy;  Laterality: N/A;    ERCP N/A 4/30/2021    Procedure: ERCP (ENDOSCOPIC RETROGRADE CHOLANGIOPANCREATOGRAPHY);  Surgeon: Boo Gray MD;  Location: [facility] ENDO (2ND FLR);  Service: Endoscopy;  Laterality: N/A;    ERCP N/A 7/1/2021    Procedure: ERCP (ENDOSCOPIC RETROGRADE CHOLANGIOPANCREATOGRAPHY);  Surgeon: Helio Cheema MD;  Location: [facility] ENDO (2ND FLR);  Service: Endoscopy;  Laterality: N/A;  Ok for Taxi. Dr Cheema  rapid covid 1130am- tb inst email    ESOPHAGOGASTRODUODENOSCOPY  2015    ESOPHAGOGASTRODUODENOSCOPY N/A 3/4/2021    Procedure: EGD (ESOPHAGOGASTRODUODENOSCOPY);  Surgeon: Yenifer Ramirez MD;  Location: [facility] ENDO;  Service: Endoscopy;  Laterality: N/A;    ESOPHAGOGASTRODUODENOSCOPY N/A 3/24/2021    Procedure: EGD (ESOPHAGOGASTRODUODENOSCOPY);  Surgeon: Helio Cheema MD;  Location: [facility] ENDO (2ND FLR);  Service: Endoscopy;  Laterality: N/A;    EYE SURGERY  2016    Cataracts    FRACTURE SURGERY  2014    JOINT REPLACEMENT  2007    ORIF FEMUR FRACTURE Left     TOTAL KNEE ARTHROPLASTY Left 2007     TUBAL LIGATION         Review of patient's allergies indicates:   Allergen Reactions    Wellbutrin [bupropion hcl] Other (See Comments)     Hyponatremia and hypomagnesia     Zoloft [sertraline] Other (See Comments)     Hyponatremia and hypomagnesia     Bananas [banana]      Emesis, and stomach cramps    Patient states she is not allergic to Banana       Medications:  Continuous Infusions:  Scheduled Meds:   ascorbic acid (vitamin C)  250 mg Oral Daily    atorvastatin  40 mg Oral Daily    DULoxetine  60 mg Oral Daily    fluticasone furoate-vilanteroL  1 puff Inhalation Daily    furosemide (LASIX) injection  60 mg Intravenous Q8H    gabapentin  300 mg Oral TID    heparin (porcine)  5,000 Units Subcutaneous Q8H    losartan  100 mg Oral Daily    magnesium oxide  400 mg Oral BID    metoprolol succinate  50 mg Oral Daily    multivitamin  1 tablet Oral Daily    mupirocin   Nasal BID    pantoprazole  40 mg Oral Daily    vitamin D  1,000 Units Oral Daily     PRN Meds:acetaminophen, albuterol-ipratropium, HYDROmorphone, ondansetron, oxyCODONE, sodium chloride 0.9%    Family History       Problem Relation (Age of Onset)    Alzheimer's disease Mother    Arthritis Father, Brother    Cancer Sister    Colon cancer Brother    Dementia Mother    Depression Mother    Hypertension Mother, Brother    Miscarriages / Stillbirths Sister    Myasthenia gravis Father    No Known Problems Son    Rectal cancer Father          Tobacco Use    Smoking status: Some Days     Packs/day: 1.00     Years: 53.00     Pack years: 53.00     Types: Cigarettes     Start date: 4/30/1967    Smokeless tobacco: Never    Tobacco comments:     I had quit for about 6 months this past year. Then massive stress and started back up sometimes   Substance and Sexual Activity    Alcohol use: Yes     Alcohol/week: 7.0 standard drinks     Types: 7 Drinks containing 0.5 oz of alcohol per week     Comment: at least 1 cocktail a day    Drug use: No     Sexual activity: Not Currently     Partners: Male     Birth control/protection: Abstinence, Post-menopausal     ROS: per hpi/subjective    Objective:     Vital Signs (Most Recent):  Temp: 98.6 °F (37 °C) (02/13/23 0705)  Pulse: 109 (02/13/23 1136)  Resp: 20 (02/13/23 1136)  BP: (!) 147/74 (02/13/23 0949)  SpO2: 99 % (02/13/23 1136)   Vital Signs (24h Range):  Temp:  [98.6 °F (37 °C)-99.4 °F (37.4 °C)] 98.6 °F (37 °C)  Pulse:  [100-149] 109  Resp:  [18-37] 20  SpO2:  [98 %-100 %] 99 %  BP: (106-166)/(58-81) 147/74     Weight: 97.1 kg (214 lb)  Body mass index is 34.54 kg/m².    Physical Exam  Vitals and nursing note reviewed.   Constitutional:       General: She is not in acute distress.     Appearance: She is obese. She is ill-appearing.   HENT:      Head: Normocephalic and atraumatic.   Eyes:      Extraocular Movements: Extraocular movements intact.   Pulmonary:      Effort: No respiratory distress.      Comments: On NC  Skin:     General: Skin is warm.   Neurological:      Mental Status: She is alert.      Comments: Awake, alert, good insight, conversational   Psychiatric:         Mood and Affect: Mood normal.       Review of Symptoms        Living Arrangements:  Lives alone    Psychosocial/Cultural:   See Palliative Psychosocial Note: Yes  - lives alone at home  - has a friend that comes and checks on her  - in wheelchair   - doesn't do too much, enjoys staying home and watching tv  - 2 sons who live in . She is from  too, but states it is too cold for her to go back to now unless she really needed to  **Primary  to Follow**  Palliative Care  Consult: No      Advance Care Planning   Advance Directives:     Decision Making:  Patient answered questions  Goals of Care: The patient endorses that what is most important right now is to focus on remaining as independent as possible and improvement in condition.     Accordingly, we have decided that the best plan to meet the patient's  goals includes continuing with treatment       Significant Labs: Reviewed  CBC:   Recent Labs   Lab 02/13/23 0727   WBC 9.67   HGB 8.5*   HCT 26.5*   *        BMP:  Recent Labs   Lab 02/13/23 0727   *   *   K 3.9   CL 96   CO2 18*   BUN 8   CREATININE 0.8   CALCIUM 7.3*   MG <0.7*     LFT:  Lab Results   Component Value Date    AST 38 02/13/2023    ALKPHOS 101 02/13/2023    BILITOT 0.5 02/13/2023     Albumin:   Albumin   Date Value Ref Range Status   02/13/2023 2.5 (L) 3.5 - 5.2 g/dL Final     Protein:   Total Protein   Date Value Ref Range Status   02/13/2023 6.2 6.0 - 8.4 g/dL Final     Lactic acid:   Lab Results   Component Value Date    LACTATE 1.1 02/13/2023    LACTATE 0.7 01/17/2023       Significant Imaging: Reviewed        > 50% of 70 min visit spent in chart review, face to face discussion of goals of care,  symptom assessment, coordination of care and emotional support.    Lucho Gutierrez MD  Palliative Medicine  [facility] ([place])

## 2023-02-13 NOTE — SUBJECTIVE & OBJECTIVE
Past Medical History:   Diagnosis Date    Anxiety     Cervical spinal stenosis     Choledocholithiasis     Chronic obstructive pulmonary disease 03/16/2016    Degenerative disc disease of cervical spine     Diverticulosis 04/24/2018    History of alcohol abuse 03/02/2021    History of gastric ulcer     History of L3 compression fracture 12/19/2018    History of lung cancer     s/p completion of XRT in 10/2020    Hyperlipidemia     Iron deficiency anemia     Osteoarthritis     Stage III CKD 2017       Past Surgical History:   Procedure Laterality Date    BREAST CYST EXCISION Right     1967    CATARACT EXTRACTION      COLONOSCOPY  2015    COLONOSCOPY N/A 6/8/2022    Procedure: COLONOSCOPY;  Surgeon: Helio Cheema MD;  Location: [facility] ([address] [place] [address]);  Service: Endoscopy;  Laterality: N/A;  5/25-Pt requesting Dr. Cheema-approval given per Dr. Cheema-ml  Fully vaccinated, prep instr portal -ml    CORONARY ANGIOGRAPHY N/A 7/20/2018    Procedure: ANGIOGRAM, CORONARY ARTERY;  Surgeon: Willard Roberts MD;  Location: [facility] CATH LAB;  Service: Cardiovascular;  Laterality: N/A;    ENDOSCOPIC ULTRASOUND OF UPPER GASTROINTESTINAL TRACT N/A 3/24/2021    Procedure: ULTRASOUND, UPPER GI TRACT, ENDOSCOPIC;  Surgeon: Helio Cheema MD;  Location: [address]);  Service: Endoscopy;  Laterality: N/A;    ENDOSCOPIC ULTRASOUND OF UPPER GASTROINTESTINAL TRACT N/A 4/25/2022    Procedure: ULTRASOUND, UPPER GI TRACT, ENDOSCOPIC;  Surgeon: Helio Cheema MD;  [address]  Service: Endoscopy;  Laterality: N/A;  cardiac risk assessment 1 per Dr. Ortez. see t/e 3/17-SC  3/25:new instructions via portal. home with medical transport?-SC    ERCP N/A 3/24/2021    Procedure: ERCP (ENDOSCOPIC RETROGRADE CHOLANGIOPANCREATOGRAPHY);  Surgeon: Helio Cheema MD;  [facility]  Service: Endoscopy;  Laterality: N/A;    ERCP N/A 4/30/2021    Procedure: ERCP (ENDOSCOPIC RETROGRADE  CHOLANGIOPANCREATOGRAPHY);  Surgeon: Boo Gray MD;  Location: [facility] ENDO (2ND FLR);  Service: Endoscopy;  Laterality: N/A;    ERCP N/A 7/1/2021    Procedure: ERCP (ENDOSCOPIC RETROGRADE CHOLANGIOPANCREATOGRAPHY);  Surgeon: Helio Cheema MD;  Location: [facility] ENDO (2ND FLR);  Service: Endoscopy;  Laterality: N/A;  Ok for Taxi. Dr Cheema  rapid covid 1130am- tb inst email    ESOPHAGOGASTRODUODENOSCOPY  2015    ESOPHAGOGASTRODUODENOSCOPY N/A 3/4/2021    Procedure: EGD (ESOPHAGOGASTRODUODENOSCOPY);  Surgeon: Yenifer Ramirez MD;  [facility]: [facility];  Service: Endoscopy;  Laterality: N/A;    ESOPHAGOGASTRODUODENOSCOPY N/A 3/24/2021    Procedure: EGD (ESOPHAGOGASTRODUODENOSCOPY);  Surgeon: Helio Cheema MD;  Location: [facility] (2ND FLR);  Service: Endoscopy;  Laterality: N/A;    EYE SURGERY  2016    Cataracts    FRACTURE SURGERY  2014    JOINT REPLACEMENT  2007    ORIF FEMUR FRACTURE Left     TOTAL KNEE ARTHROPLASTY Left 2007    TUBAL LIGATION         Review of patient's allergies indicates:   Allergen Reactions    Wellbutrin [bupropion hcl] Other (See Comments)     Hyponatremia and hypomagnesia     Zoloft [sertraline] Other (See Comments)     Hyponatremia and hypomagnesia     Bananas [banana]      Emesis, and stomach cramps    Patient states she is not allergic to Banana       Family History       Problem Relation (Age of Onset)    Alzheimer's disease Mother    Arthritis Father, Brother    Cancer Sister    Colon cancer Brother    Dementia Mother    Depression Mother    Hypertension Mother, Brother    Miscarriages / Stillbirths Sister    Myasthenia gravis Father    No Known Problems Son    Rectal cancer Father          Tobacco Use    Smoking status: Some Days     Packs/day: 1.00     Years: 53.00     Pack years: 53.00     Types: Cigarettes     Start date: 4/30/1967    Smokeless tobacco: Never    Tobacco comments:     I had quit for about 6 months this past year. Then massive stress and started back up  sometimes   Substance and Sexual Activity    Alcohol use: Yes     Alcohol/week: 7.0 standard drinks     Types: 7 Drinks containing 0.5 oz of alcohol per week     Comment: at least 1 cocktail a day    Drug use: No    Sexual activity: Not Currently     Partners: Male     Birth control/protection: Abstinence, Post-menopausal         Review of Systems   Constitutional:  Positive for fatigue. Negative for chills and fever.   HENT:  Positive for postnasal drip and rhinorrhea.    Eyes: Negative.    Respiratory:  Positive for shortness of breath and wheezing. Negative for cough.    Cardiovascular:  Positive for leg swelling. Negative for palpitations.   Gastrointestinal:  Positive for abdominal pain and diarrhea. Negative for blood in stool.   Genitourinary: Negative.    Skin: Negative.    Neurological:  Positive for headaches. Negative for syncope and speech difficulty.   Hematological: Negative.    Psychiatric/Behavioral: Negative.     Objective:     Vital Signs (Most Recent):  Temp: 98.6 °F (37 °C) (02/13/23 0705)  Pulse: 109 (02/13/23 1136)  Resp: 20 (02/13/23 1136)  BP: (!) 147/74 (02/13/23 0949)  SpO2: 99 % (02/13/23 1136)   Vital Signs (24h Range):  Temp:  [98.6 °F (37 °C)-99.4 °F (37.4 °C)] 98.6 °F (37 °C)  Pulse:  [100-149] 109  Resp:  [18-37] 20  SpO2:  [98 %-100 %] 99 %  BP: (106-166)/(58-81) 147/74     Weight: 97.1 kg (214 lb)  Body mass index is 34.54 kg/m².    No intake or output data in the 24 hours ending 02/13/23 1345    Physical Exam  Vitals and nursing note reviewed.   Constitutional:       General: She is not in acute distress.     Appearance: She is obese. She is ill-appearing.   HENT:      Head: Normocephalic.      Nose: Rhinorrhea present.      Mouth/Throat:      Mouth: Mucous membranes are moist.      Pharynx: Oropharynx is clear. No oropharyngeal exudate.   Eyes:      General: No scleral icterus.     Conjunctiva/sclera: Conjunctivae normal.   Cardiovascular:      Rate and Rhythm: Regular rhythm.  Tachycardia present.   Pulmonary:      Effort: Pulmonary effort is normal. No respiratory distress.      Breath sounds: Wheezing and rhonchi present.   Chest:      Chest wall: Tenderness present.   Abdominal:      Palpations: Abdomen is soft.      Tenderness: There is abdominal tenderness. There is no guarding or rebound.   Musculoskeletal:      Right lower leg: Edema (2+ mid thigh) present.      Left lower leg: Edema (2+ mid thigh) present.   Skin:     General: Skin is warm.      Capillary Refill: Capillary refill takes 2 to 3 seconds.      Coloration: Skin is not jaundiced.   Neurological:      General: No focal deficit present.      Mental Status: She is alert and oriented to person, place, and time. Mental status is at baseline.   Psychiatric:         Mood and Affect: Mood normal.         Behavior: Behavior normal.       Vents:  Oxygen Concentration (%): 35 (02/13/23 0800)    Lines/Drains/Airways       Drain  Duration             Female External Urinary Catheter 02/13/23 0540 <1 day              Peripheral Intravenous Line  Duration                  Peripheral IV - Single Lumen 02/13/23 0100 20 G Anterior;Left Forearm <1 day         Peripheral IV - Single Lumen 02/13/23 0513 20 G Anterior;Distal;Right Forearm <1 day                    Significant Labs:    CBC/Anemia Profile:  Recent Labs   Lab 02/13/23  0159 02/13/23  0727   WBC 13.00* 9.67   HGB 8.5* 8.5*   HCT 25.9* 26.5*    251   * 102*   RDW 17.3* 17.2*        Chemistries:  Recent Labs   Lab 02/13/23  0113 02/13/23  0727   * 131*   K 5.4* 3.9   CL 97 96   CO2 16* 18*   BUN 8 8   CREATININE 0.8 0.8   CALCIUM 7.1* 7.3*   ALBUMIN 2.5*  --    PROT 6.2  --    BILITOT 0.5  --    ALKPHOS 101  --    ALT 12  --    AST 38  --    MG  --  <0.7*   PHOS  --  3.2       ABGs:   Recent Labs   Lab 02/13/23  0740   PH 7.494*   PCO2 25.6*   HCO3 19.7*   POCSATURATED 99   BE -4       Significant Imaging:   CXR: I have reviewed all pertinent results/findings  within the past 24 hours and my personal findings are:  Some bilateral air space disease, increased in the lower lung zones with some loss of the L dez-diaphragm and meniscus

## 2023-02-13 NOTE — NURSING
Patent admitted to ICU room 2 via stretcher. AAO x4. Complains of being SOB but denies other pain. Sinus tachy  on the monitor, BP stable. Placed on Bipap 10/5/35% with SpO2 100. Pure wick applied. NP Arlene at bedside assessing patient. Updated on plan of care. Patient stated she lives alone and physical therapy comes twice a week to care for her. Partial bed bath given. Ashvin ointment applied to buttox  and under left breast. Call light given. Patient oriented to room. Side rails up x2, bed locked and in lowest position.

## 2023-02-13 NOTE — ASSESSMENT & PLAN NOTE
Patient with concern for acute on chronic hypoxic respiratory failure. SpO2 reportedly low in the ED and placed on BiPAP overnight. ABG this morning showed respiratory alkylosis without evidence of new hypoxemia. She was also given IV lasix and albuterol nebulizers along with initial CAP treatment x 1 dose. Based on her physical exam, CXR, and history, it appears she may be volume overloaded, despite her diarrheal illness, from self-discontinuation of her torsemide over the last few days. She feels much better and is back to her baseline of 2L O2 this morning. She has cacophonous breath sounds, likely related to her volume status. No pleural effusion was appreciated on bedside ultrasound.   - continue diuretics at this time for volume overload  - duo nebs PRN  - continue home supplemental O2    - goal o2 sats of 88-92%  - continue home LABA/LAMA/ICS  - agree with discontinuation of CAP coverage given negative procalcitonin, although would restart this if she spikes a fever  - follow up repeat TTE pending

## 2023-02-13 NOTE — CONSULTS
[facility] Intensive Care [facility])  Wound Care    Patient Name:  Nalini Varma   MRN:  [id number]  Date: 2/13/2023  Diagnosis: Acute on chronic diastolic heart failure    History:     Past Medical History:   Diagnosis Date    Anxiety     Cervical spinal stenosis     Choledocholithiasis     Chronic obstructive pulmonary disease 03/16/2016    Degenerative disc disease of cervical spine     Diverticulosis 04/24/2018    History of alcohol abuse 03/02/2021    History of gastric ulcer     History of L3 compression fracture 12/19/2018    History of lung cancer     s/p completion of XRT in 10/2020    Hyperlipidemia     Iron deficiency anemia     Osteoarthritis     Stage III CKD 2017       Social History     Socioeconomic History    Marital status: Single    Number of children: 2   Occupational History    Occupation: unemployed   Tobacco Use    Smoking status: Some Days     Packs/day: 1.00     Years: 53.00     Pack years: 53.00     Types: Cigarettes     Start date: 4/30/1967    Smokeless tobacco: Never    Tobacco comments:     I had quit for about 6 months this past year. Then massive stress and started back up sometimes   Substance and Sexual Activity    Alcohol use: Yes     Alcohol/week: 7.0 standard drinks     Types: 7 Drinks containing 0.5 oz of alcohol per week     Comment: at least 1 cocktail a day    Drug use: No    Sexual activity: Not Currently     Partners: Male     Birth control/protection: Abstinence, Post-menopausal   Other Topics Concern    Are you pregnant or think you may be? No   Social History Narrative    Originally from [place]    Living in [facility] since 1998    Living in [facility]     Social Determinants of Health     Financial Resource Strain: Medium Risk    Difficulty of Paying Living Expenses: Somewhat hard   Food Insecurity: Food Insecurity Present    Worried About Running Out of Food in the Last Year: Sometimes true    Ran Out of Food in the Last Year: Sometimes true   Transportation Needs: Unmet  Transportation Needs    Lack of Transportation (Medical): Yes    Lack of Transportation (Non-Medical): Yes   Physical Activity: Insufficiently Active    Days of Exercise per Week: 3 days    Minutes of Exercise per Session: 30 min   Stress: No Stress Concern Present    Feeling of Stress : Only a little   Social Connections: Socially Isolated    Frequency of Communication with Friends and Family: Once a week    Frequency of Social Gatherings with Friends and Family: Three times a week    Attends Gnosticism Services: Never    Active Member of Clubs or Organizations: No    Attends Club or Organization Meetings: Patient refused    Marital Status:    Housing Stability: High Risk    Unable to Pay for Housing in the Last Year: Yes    Number of Places Lived in the Last Year: 5    Unstable Housing in the Last Year: Yes       Precautions:     Allergies as of 02/13/2023 - Reviewed 02/13/2023   Allergen Reaction Noted    Wellbutrin [bupropion hcl] Other (See Comments) 06/28/2017    Zoloft [sertraline] Other (See Comments) 06/28/2017    Bananas [banana]  08/27/2021       WO Assessment Details/Treatment     Wound care consult received for assessment of groin, left breast and buttocks. Patient is a 72 year old female known to wound care from previous admissions. Past medical history includes COPD (on home O2 3L) with 53 pack year smoking hx, current smoker, lung CA, chronic anemia, anxiety, CKD Stage 3, systolic and diastolic heart failure who presents with shortness of breath that started over the last few days.     Upon assessment to left breast and groin noted moisture related redness and irritation. Buttocks with incontinent associated dermatitis with mild redness and scattered partial thickness skin loss. Right medial foot is a chronic wound with dry fibrinous tissue.     Recommendations:   - Z guard ointment to buttocks to manage moisture and promote moist wound healing   - Interdry to breast and groin to manage  moisture and promote healing of irritated skin   - santyl to right medial foot wound to provide enzymatic debridement for moist wound healing       02/13/23 1254        Altered Skin Integrity 02/13/23 0540 Groin Incontinence associated dermatitis Intact skin with non-blanchable redness of localized area   Date First Assessed/Time First Assessed: 02/13/23 0540   Altered Skin Integrity Present on Admission: yes  Location: Groin  Is this injury device related?: No  Primary Wound Type: Incontinence associated dermatitis  Description of Altered Skin Integri...   Wound Image    Dressing Appearance Open to air   Drainage Amount None   Appearance Intact;Moist;Red   Tissue loss description Not applicable   Periwound Area Redness   Dressing   (Interdry moisture wicking cloth in place)        Altered Skin Integrity 02/13/23 0540 Left lower Breast Moisture associated dermatitis Intact skin with non-blanchable redness of localized area   Date First Assessed/Time First Assessed: 02/13/23 0540   Altered Skin Integrity Present on Admission: yes  Side: Left  Orientation: lower  Location: Breast  Is this injury device related?: No  Primary Wound Type: Moisture associated dermatitis  Descri...   Wound Image    Dressing Appearance Open to air   Drainage Amount None   Appearance Red;Moist   Tissue loss description Not applicable   Care   (Interdry currently in place)        Altered Skin Integrity 01/18/23 Buttocks Incontinence associated dermatitis   Date First Assessed: 01/18/23   Altered Skin Integrity Present on Admission: yes  Location: Buttocks  Is this injury device related?: No  Primary Wound Type: Incontinence associated dermatitis   Wound Image    Dressing Appearance Open to air   Drainage Amount None   Drainage Characteristics/Odor No odor   Appearance Red;Pink;Moist   Tissue loss description Partial thickness   Periwound Area Intact;Redness   Care Applied:;Skin Barrier        Altered Skin Integrity 12/23/22 1445 Right  medial;lateral Foot Blister(s)   Date First Assessed/Time First Assessed: 12/23/22 1445   Side: Right  Orientation: (c) medial;lateral  Location: Foot  Is this injury device related?: No  Primary Wound Type: (c) Blister(s)   Wound Image    Description of Altered Skin Integrity Full thickness tissue loss. Base is covered by slough and/or eschar in the wound bed   Dressing Appearance Open to air   Drainage Amount None   Drainage Characteristics/Odor No odor   Appearance Dry;Fibrin   Tissue loss description Full thickness   Periwound Area Intact;Dry   Wound Edges Undefined   Wound Length (cm) 0.7 cm   Wound Width (cm) 0.9 cm   Wound Surface Area (cm^2) 0.63 cm^2               02/13/2023

## 2023-02-13 NOTE — ASSESSMENT & PLAN NOTE
Diarrheal illness appears to have abated so far during this admission. Given recent bout of c.diff status post oral vancomycin treatment, would consider repeat studies for diffuse diarrheal illness and consult GI/ID if this continues for recurrent c. Diff treatment. She is not currently in need of pressor support or showing evidence of shock from this illness.  - continue to monitor  - consider further investigative studies if she continues to have diarrhea while inpatient

## 2023-02-13 NOTE — ASSESSMENT & PLAN NOTE
Recurrent stage 1 disease with unknown staging as there was not follow up biopsy performed. She was undergoing chemo/rads for this in December but she stopped chemotherapy and radiation in January per patients request. Plans for repeat surveillance CT scan in 1 month.

## 2023-02-13 NOTE — H&P
[facility] Intensive Care [facility] Medicine  History & Physical    Patient Name: Nalini Varma  MRN: [id number]  Patient Class: IP- Inpatient  Admission Date: 2/13/2023  Attending Physician: Florentino Camacho MD   Primary Care Provider: Yane Sahni MD         Patient information was obtained from patient, past medical records and ER records.     Subjective:     Principal Problem:Acute on chronic diastolic heart failure    Chief Complaint:   Chief Complaint   Patient presents with    Shortness of Breath     Pt has progressively gotten worse shortness of breath throughout the night - pt called 911, aasi arrived found the pt with an spo2 in the 50's - aasi gave the pt 1 duo neb treatments, 3 sprays of nitro and placed the pt on cpap -         HPI: The patient is a 72 y.o. female with a past medical history of COPD (on home O2 3L) with 53 pack year smoking hx, current smoker, lung CA, chronic anemia, anxiety, CKD Stage 3, systolic and diastolic heart failure who presents with shortness of breath that started over the last few days.  Also has some diarrhea.  On arrival, oxygen saturations were 50%.  Initially started on nasal cannula with no improvement and was placed on BiPAP.  She also received 1 breathing treatment as well as 0.4 mg of nitroglycerin spray.  On initial workup, the patient's BNP is elevated to 378, some peripheral edema.  She will be admitted for further management of her acute on chronic respiratory failure with hypoxia and acute on chronic systolic heart failure.      Past Medical History:   Diagnosis Date    Anxiety     Cervical spinal stenosis     Choledocholithiasis     Chronic obstructive pulmonary disease 03/16/2016    Degenerative disc disease of cervical spine     Diverticulosis 04/24/2018    History of alcohol abuse 03/02/2021    History of gastric ulcer     History of L3 compression fracture 12/19/2018    History of lung cancer     s/p completion of XRT in  10/2020    Hyperlipidemia     Iron deficiency anemia     Osteoarthritis     Stage III CKD 2017       Past Surgical History:   Procedure Laterality Date    BREAST CYST EXCISION Right     1967    CATARACT EXTRACTION      COLONOSCOPY  2015    COLONOSCOPY N/A 6/8/2022    Procedure: COLONOSCOPY;  Surgeon: Helio Cheema MD;  Location: [facility] (2ND FLR);  Service: Endoscopy;  Laterality: N/A;  5/25-Pt requesting Dr. Cheema-approval given per Dr. Cheema-ml  Fully vaccinated, prep instr portal -ml    CORONARY ANGIOGRAPHY N/A 7/20/2018    Procedure: ANGIOGRAM, CORONARY ARTERY;  Surgeon: Willard Roberts MD;  Location: [facility] CATH LAB;  Service: Cardiovascular;  Laterality: N/A;    ENDOSCOPIC ULTRASOUND OF UPPER GASTROINTESTINAL TRACT N/A 3/24/2021    Procedure: ULTRASOUND, UPPER GI TRACT, ENDOSCOPIC;  Surgeon: Helio Cheema MD;  [facility]  Service: Endoscopy;  Laterality: N/A;    ENDOSCOPIC ULTRASOUND OF UPPER GASTROINTESTINAL TRACT N/A 4/25/2022    Procedure: ULTRASOUND, UPPER GI TRACT, ENDOSCOPIC;  Surgeon: Helio Cheema MD;  Location: [facility] (2ND FLR);  Service: Endoscopy;  Laterality: N/A;  cardiac risk assessment 1 per Dr. Ortez. see t/e 3/17-SC  3/25:new instructions via portal. home with medical transport?-SC    ERCP N/A 3/24/2021    Procedure: ERCP (ENDOSCOPIC RETROGRADE CHOLANGIOPANCREATOGRAPHY);  Surgeon: Helio Cheema MD;  Location: [facility] (2ND FLR);  Service: Endoscopy;  Laterality: N/A;    ERCP N/A 4/30/2021    Procedure: ERCP (ENDOSCOPIC RETROGRADE CHOLANGIOPANCREATOGRAPHY);  Surgeon: Boo Gray MD;  Location: [facility] (2ND FLR);  Service: Endoscopy;  Laterality: N/A;    ERCP N/A 7/1/2021    Procedure: ERCP (ENDOSCOPIC RETROGRADE CHOLANGIOPANCREATOGRAPHY);  Surgeon: Helio Cheema MD;  Location: [facility] ENDO (2ND FLR);  Service: Endoscopy;  Laterality: N/A;  Ok for Taxi. Dr Cheema  rapid covid 1130am- tb inst email     ESOPHAGOGASTRODUODENOSCOPY  2015    ESOPHAGOGASTRODUODENOSCOPY N/A 3/4/2021    Procedure: EGD (ESOPHAGOGASTRODUODENOSCOPY);  Surgeon: Yenifer Ramirez MD;  Location: [facility];  Service: Endoscopy;  Laterality: N/A;    ESOPHAGOGASTRODUODENOSCOPY N/A 3/24/2021    Procedure: EGD (ESOPHAGOGASTRODUODENOSCOPY);  Surgeon: Helio Cheema MD;  [facility]  Service: Endoscopy;  Laterality: N/A;    EYE SURGERY  2016    Cataracts    FRACTURE SURGERY  2014    JOINT REPLACEMENT  2007    ORIF FEMUR FRACTURE Left     TOTAL KNEE ARTHROPLASTY Left 2007    TUBAL LIGATION         Review of patient's allergies indicates:   Allergen Reactions    Wellbutrin [bupropion hcl] Other (See Comments)     Hyponatremia and hypomagnesia     Zoloft [sertraline] Other (See Comments)     Hyponatremia and hypomagnesia     Bananas [banana]      Emesis, and stomach cramps    Patient states she is not allergic to Banana       No current facility-administered medications on file prior to encounter.     Current Outpatient Medications on File Prior to Encounter   Medication Sig    albuterol (VENTOLIN HFA) 90 mcg/actuation inhaler inhale 1-2 puffs as needed every 6 hours for wheezing and shortness of breath    ALPRAZolam (XANAX) 0.25 MG tablet Take 1 tablet (0.25 mg total) by mouth daily as needed for Anxiety.    ascorbic acid, vitamin C, (VITAMIN C) 250 MG tablet Take 250 mg by mouth once daily.    atorvastatin (LIPITOR) 40 MG tablet TAKE 1 TABLET BY MOUTH EVERY DAILY    azelastine (ASTELIN) 137 mcg (0.1 %) nasal spray Instill 1 spray (137 mcg total) by Nasal route 2 (two) times daily.    b complex vitamins capsule Take 1 capsule by mouth once daily.    biotin 10 mg Tab Take 10 mg by mouth once daily.    calcium carbonate (OS-SANDRINE) 500 mg calcium (1,250 mg) tablet Take 2 tablets (1,000 mg total) by mouth 2 (two) times daily.    cyanocobalamin, vitamin B-12, 1,000 mcg TbSR Take 1,000 mcg by mouth once daily.     denosumab (PROLIA) 60 mg/mL Syrg Inject 1 mL (60 mg total) into the skin every 6 (six) months.    DULoxetine (CYMBALTA) 60 MG capsule Take 1 capsule (60 mg total) by mouth once daily.    fluticasone (FLONASE) 50 mcg/actuation nasal spray 1 spray by Each Nare route 2 (two) times daily as needed for Rhinitis.    fluticasone propion-salmeterol 115-21 mcg/dose (ADVAIR HFA) 115-21 mcg/actuation HFAA inhaler Inhale 2 puffs into the lungs every 12 (twelve) hours.    gabapentin (NEURONTIN) 300 MG capsule Take 1 capsule (300 mg total) by mouth 3 (three) times daily.    guaiFENesin (MUCINEX) 600 mg 12 hr tablet Take 1,200 mg by mouth 2 (two) times daily as needed for Congestion.    HYDROcodone-acetaminophen (NORCO)  mg per tablet Take 1 tablet by mouth every 12 (twelve) hours as needed for Pain.    losartan (COZAAR) 100 MG tablet Take 1 tablet (100 mg total) by mouth once daily. HOLD UNTIL YOU SEE YOUR PCP    lutein 40 mg Cap Take by mouth.    magnesium oxide (MAG-OX) 400 mg (241.3 mg magnesium) tablet Take 1 tablet by mouth every 12 (twelve) hours.    metoprolol succinate (TOPROL-XL) 25 MG 24 hr tablet Take 2 tablets (50 mg total) by mouth once daily.    multivit-min/iron/folic/lutein (CENTRUM SILVER WOMEN ORAL) Take 1 tablet by mouth once daily.    ondansetron (ZOFRAN-ODT) 8 MG TbDL dissolve 1 tablet (8 mg total) by mouth every 8 (eight) hours as needed (nausea).    pantoprazole (PROTONIX) 40 MG tablet Take 1 tablet (40 mg total) by mouth once daily.    promethazine (PHENERGAN) 25 MG tablet Take 1 tablet (25 mg total) by mouth every 6 (six) hours as needed for Nausea.    torsemide (DEMADEX) 20 MG Tab Take 1 tablet (20 mg total) by mouth once daily.    traZODone (DESYREL) 100 MG tablet Take 1 tablet (100 mg total) by mouth every evening.    umeclidinium (INCRUSE ELLIPTA) 62.5 mcg/actuation inhalation capsule Inhale 1 puff into the lungs once daily. Controller    vitamin D (VITAMIN D3) 1000 units Tab  Take 1 tablet (1,000 Units total) by mouth once daily.    ZINC ORAL Take by mouth.     Family History       Problem Relation (Age of Onset)    Alzheimer's disease Mother    Arthritis Father, Brother    Cancer Sister    Colon cancer Brother    Dementia Mother    Depression Mother    Hypertension Mother, Brother    Miscarriages / Stillbirths Sister    Myasthenia gravis Father    No Known Problems Son    Rectal cancer Father          Tobacco Use    Smoking status: Some Days     Packs/day: 1.00     Years: 53.00     Pack years: 53.00     Types: Cigarettes     Start date: 4/30/1967    Smokeless tobacco: Never    Tobacco comments:     I had quit for about 6 months this past year. Then massive stress and started back up sometimes   Substance and Sexual Activity    Alcohol use: Yes     Alcohol/week: 7.0 standard drinks     Types: 7 Drinks containing 0.5 oz of alcohol per week     Comment: at least 1 cocktail a day    Drug use: No    Sexual activity: Not Currently     Partners: Male     Birth control/protection: Abstinence, Post-menopausal     Review of Systems   Constitutional:  Positive for activity change, appetite change and fatigue. Negative for fever.   HENT:  Negative for congestion, ear pain, rhinorrhea and sinus pressure.    Eyes:  Negative for pain and discharge.   Respiratory:  Negative for cough, chest tightness, shortness of breath and wheezing.    Cardiovascular:  Positive for leg swelling. Negative for chest pain.   Gastrointestinal:  Positive for diarrhea. Negative for abdominal distention, abdominal pain, nausea and vomiting.   Endocrine: Negative for cold intolerance and heat intolerance.   Genitourinary:  Negative for difficulty urinating, flank pain, frequency, hematuria and urgency.   Musculoskeletal:  Negative for arthralgias, joint swelling and myalgias.   Allergic/Immunologic: Negative for environmental allergies and food allergies.   Neurological:  Negative for dizziness, weakness,  light-headedness and headaches.   Hematological:  Does not bruise/bleed easily.   Psychiatric/Behavioral:  Negative for agitation, behavioral problems and decreased concentration.    Objective:     Vital Signs (Most Recent):  Temp: 98.9 °F (37.2 °C) (23 0140)  Pulse: (!) 123 (23 0549)  Resp: (!) 27 (23 05)  BP: 136/80 (23 0549)  SpO2: 100 % (23)   Vital Signs (24h Range):  Temp:  [98.9 °F (37.2 °C)] 98.9 °F (37.2 °C)  Pulse:  [100-128] 123  Resp:  [20-27] 27  SpO2:  [100 %] 100 %  BP: (106-136)/(58-80) 136/80     Weight: 99.8 kg (220 lb)  Body mass index is 35.51 kg/m².    Physical Exam  Constitutional:       Appearance: She is well-developed. She is ill-appearing.   HENT:      Head: Normocephalic.   Eyes:      General:         Right eye: No discharge.         Left eye: No discharge.      Conjunctiva/sclera: Conjunctivae normal.   Cardiovascular:      Rate and Rhythm: Tachycardia present. Rhythm regularly irregular.      Pulses:           Radial pulses are 2+ on the right side and 2+ on the left side.      Heart sounds: Normal heart sounds.   Pulmonary:      Effort: Pulmonary effort is normal. Tachypnea present. No respiratory distress.      Breath sounds: Examination of the right-lower field reveals rales. Examination of the left-lower field reveals rales. Rales present.   Abdominal:      General: Bowel sounds are decreased. There is no distension.      Palpations: Abdomen is soft.      Tenderness: There is no abdominal tenderness.   Musculoskeletal:         General: Normal range of motion.      Cervical back: Normal range of motion and neck supple.      Right lower le+ Pitting Edema present.      Left lower le+ Pitting Edema present.   Skin:     General: Skin is warm and dry.      Coloration: Skin is pale.   Neurological:      Mental Status: She is alert and oriented to person, place, and time.      GCS: GCS eye subscore is 4. GCS verbal subscore is 5. GCS motor  subscore is 6.      Motor: Motor function is intact.   Psychiatric:         Mood and Affect: Mood normal.         Speech: Speech normal.         Behavior: Behavior normal.           Significant Labs: All pertinent labs within the past 24 hours have been reviewed.  CBC:   Recent Labs   Lab 02/13/23  0159   WBC 13.00*   HGB 8.5*   HCT 25.9*        CMP:   Recent Labs   Lab 02/13/23  0113   *   K 5.4*   CL 97   CO2 16*   GLU 66*   BUN 8   CREATININE 0.8   CALCIUM 7.1*   PROT 6.2   ALBUMIN 2.5*   BILITOT 0.5   ALKPHOS 101   AST 38   ALT 12   ANIONGAP 18*       Significant Imaging: I have reviewed all pertinent imaging results/findings within the past 24 hours.    Assessment/Plan:     * Acute on chronic diastolic heart failure  BNP- 378, peripheral edema    CXR- Small bilateral pleural effusions are seen, left greater than right.  There are bibasilar opacities and mild increased interstitial attenuation suggestive for mild pulmonary edema, more pronounced within the lower lung zones.  Potential superimposed aspiration or infectious process/developing pneumonia not excluded in the right clinical setting.  No pneumothorax.    Lasix TID  Consider Cardiology consult  Bipap      Acute on chronic respiratory failure with hypoxia and hypercapnia  Patient with Hypoxic Respiratory failure which is Acute on chronic.  she is on home oxygen at 3 LPM. Supplemental oxygen was provided and noted- Oxygen Concentration (%):  [] 35.   Signs/symptoms of respiratory failure include- tachypnea, increased work of breathing, respiratory distress and wheezing. Contributing diagnoses includes - CHF and COPD Labs and images were reviewed. Patient Has not had a recent ABG. Will treat underlying causes and adjust management of respiratory failure as follows- Continue Breo, Bipap, Duonebs    Diarrhea  Patient with diarrhea over the last 24 hours.  C diff studies pending. Isolation.      Recurrent adenocarcinoma of left lung  Noted.         CKD (chronic kidney disease) stage 3, GFR 30-59 ml/min  Creatinine .8, at baseline    Monitor for acute decompensation      Hyperkalemia  K- 5.4    Recheck BMP with AM labs  Lasix    Hyperlipidemia  Continue Lipitor      Essential hypertension  Normotensive currently    Continue losartan, toprol  Lasix TID        VTE Risk Mitigation (From admission, onward)         Ordered     heparin (porcine) injection 5,000 Units  Every 8 hours         02/13/23 0447     IP VTE HIGH RISK PATIENT  Once         02/13/23 0447     Place sequential compression device  Until discontinued         02/13/23 0447                   Emigdio Holly NP  Department of Hospital Medicine   [facility] Int[facility] ([place])   Closure 2 Information: This tab is for additional flaps and grafts, including complex repair and grafts and complex repair and flaps. You can also specify a different location for the additional defect, if the location is the same you do not need to select a new one. We will insert the automated text for the repair you select below just as we do for solitary flaps and grafts. Please note that at this time if you select a location with a different insurance zone you will need to override the ICD10 and CPT if appropriate.

## 2023-02-13 NOTE — ASSESSMENT & PLAN NOTE
Patient with Hypoxic Respiratory failure which is Acute on chronic.  she is on home oxygen at 3 LPM. Supplemental oxygen was provided and noted- Oxygen Concentration (%):  [] 35.   Signs/symptoms of respiratory failure include- tachypnea, increased work of breathing, respiratory distress and wheezing. Contributing diagnoses includes - CHF and COPD Labs and images were reviewed. Patient Has not had a recent ABG. Will treat underlying causes and adjust management of respiratory failure as follows- Continue Breo, Bipap, Duonebs

## 2023-02-13 NOTE — CONSULTS
[facility] - Intensive Care ([place])  Pulmonology  Consult Note    Patient Name: Nalini Varma  MRN: [id number]  Admission Date: 2/13/2023  Hospital Length of Stay: 0 days  Code Status: Full Code  Attending Physician: Florentino Camacho MD  Primary Care Provider: Yane Sahni MD   Principal Problem: Acute on chronic diastolic heart failure    Inpatient consult to Pulmonary Critical Care  Consult performed by: Justin Ellerman, MD  Consult ordered by: Florentino Camacho MD        Subjective:     HPI:  72 y.o. female  with chronic pain, anxiety, COPD on 2 liters of home oxygen, CHF (EF of 38%), HTN, CKD stage 3 and lung adenocarcinoma presenting to the emergency department on 2/12 for worsening shortness of breath. According to the patient, she was diagnosed with c. Diff on 1/20 during a hospital admission for diarrhea and completed a 10 day course of oral vancomycin. She notes that a few days ago, she began having recurrent diffuse watery diarrhea without fevers, chills, nausea, some abdominal discomfort and without bloody stools. She stopped taking her PO diuretic as she thought she was already losing enough fluids and didn't want to make it worse. Yesterday, she began getting more and more short of breath even at rest, and EMS was called to bring her to the hospital. In the ED, the patient appeared volume overlaoded SpO2 apparently recorded at 50% but without an ABG to confirm, and the patient was immediately placed on BiPAP. CXR shows some concern for air space disease and possible pleural effusions bilaterally. She was started on CAP/COPD exacerbation coverage and admitted on BiPAP.     Overnight, the patient improved and BiPAP was removed this morning and she was placed on 2L NC. She was started on IV diuresis with net negative 1.0L out so far with improvement of her symptoms. No diarrhea noted since admission.      Past Medical History:   Diagnosis Date    Anxiety     Cervical spinal stenosis      Choledocholithiasis     Chronic obstructive pulmonary disease 03/16/2016    Degenerative disc disease of cervical spine     Diverticulosis 04/24/2018    History of alcohol abuse 03/02/2021    History of gastric ulcer     History of L3 compression fracture 12/19/2018    History of lung cancer     s/p completion of XRT in 10/2020    Hyperlipidemia     Iron deficiency anemia     Osteoarthritis     Stage III CKD 2017       Past Surgical History:   Procedure Laterality Date    BREAST CYST EXCISION Right     1967    CATARACT EXTRACTION      COLONOSCOPY  2015    COLONOSCOPY N/A 6/8/2022    Procedure: COLONOSCOPY;  Surgeon: Helio Cheema MD;  Location: [facility] (2ND FLR);  Service: Endoscopy;  Laterality: N/A;  5/25-Pt requesting Dr. Cheema-approval given per Dr. Cheema-ml  Fully vaccinated, prep instr portal -ml    CORONARY ANGIOGRAPHY N/A 7/20/2018    Procedure: ANGIOGRAM, CORONARY ARTERY;  Surgeon: Willard Roberts MD;  Location: [facility] CATH LAB;  Service: Cardiovascular;  Laterality: N/A;    ENDOSCOPIC ULTRASOUND OF UPPER GASTROINTESTINAL TRACT N/A 3/24/2021    Procedure: ULTRASOUND, UPPER GI TRACT, ENDOSCOPIC;  Surgeon: Helio Cheema MD;  Location: [facility] (2ND FLR);  Service: Endoscopy;  Laterality: N/A;    ENDOSCOPIC ULTRASOUND OF UPPER GASTROINTESTINAL TRACT N/A 4/25/2022    Procedure: ULTRASOUND, UPPER GI TRACT, ENDOSCOPIC;  Surgeon: Helio Cheema MD;  Location: [facility] (2ND FLR);  Service: Endoscopy;  Laterality: N/A;  cardiac risk assessment 1 per Dr. Ortez. see t/e 3/17-SC  3/25:new instructions via portal. home with medical transport?-SC    ERCP N/A 3/24/2021    Procedure: ERCP (ENDOSCOPIC RETROGRADE CHOLANGIOPANCREATOGRAPHY);  Surgeon: Helio Cheema MD;  Location: [facility] (2ND FLR);  Service: Endoscopy;  Laterality: N/A;    ERCP N/A 4/30/2021    Procedure: ERCP (ENDOSCOPIC RETROGRADE CHOLANGIOPANCREATOGRAPHY);  Surgeon: Boo Gray MD;  Location: [facility]  (2ND FLR);  Service: Endoscopy;  Laterality: N/A;    ERCP N/A 7/1/2021    Procedure: ERCP (ENDOSCOPIC RETROGRADE CHOLANGIOPANCREATOGRAPHY);  Surgeon: Helio Cheema MD;  Location: [facility] ENDO (2ND FLR);  Service: Endoscopy;  Laterality: N/A;  Ok for Taxi. Dr Cheema  rapid covid 1130am- tb inst email    ESOPHAGOGASTRODUODENOSCOPY  2015    ESOPHAGOGASTRODUODENOSCOPY N/A 3/4/2021    Procedure: EGD (ESOPHAGOGASTRODUODENOSCOPY);  Surgeon: Yenifer Ramirez MD;  Location: [facility] ENDO;  Service: Endoscopy;  Laterality: N/A;    ESOPHAGOGASTRODUODENOSCOPY N/A 3/24/2021    Procedure: EGD (ESOPHAGOGASTRODUODENOSCOPY);  Surgeon: Helio Cheema MD;  Location: [facility] (2ND FLR);  Service: Endoscopy;  Laterality: N/A;    EYE SURGERY  2016    Cataracts    FRACTURE SURGERY  2014    JOINT REPLACEMENT  2007    ORIF FEMUR FRACTURE Left     TOTAL KNEE ARTHROPLASTY Left 2007    TUBAL LIGATION         Review of patient's allergies indicates:   Allergen Reactions    Wellbutrin [bupropion hcl] Other (See Comments)     Hyponatremia and hypomagnesia     Zoloft [sertraline] Other (See Comments)     Hyponatremia and hypomagnesia     Bananas [banana]      Emesis, and stomach cramps    Patient states she is not allergic to Banana       Family History       Problem Relation (Age of Onset)    Alzheimer's disease Mother    Arthritis Father, Brother    Cancer Sister    Colon cancer Brother    Dementia Mother    Depression Mother    Hypertension Mother, Brother    Miscarriages / Stillbirths Sister    Myasthenia gravis Father    No Known Problems Son    Rectal cancer Father          Tobacco Use    Smoking status: Some Days     Packs/day: 1.00     Years: 53.00     Pack years: 53.00     Types: Cigarettes     Start date: 4/30/1967    Smokeless tobacco: Never    Tobacco comments:     I had quit for about 6 months this past year. Then massive stress and started back up sometimes   Substance and Sexual Activity    Alcohol use: Yes      Alcohol/week: 7.0 standard drinks     Types: 7 Drinks containing 0.5 oz of alcohol per week     Comment: at least 1 cocktail a day    Drug use: No    Sexual activity: Not Currently     Partners: Male     Birth control/protection: Abstinence, Post-menopausal         Review of Systems   Constitutional:  Positive for fatigue. Negative for chills and fever.   HENT:  Positive for postnasal drip and rhinorrhea.    Eyes: Negative.    Respiratory:  Positive for shortness of breath and wheezing. Negative for cough.    Cardiovascular:  Positive for leg swelling. Negative for palpitations.   Gastrointestinal:  Positive for abdominal pain and diarrhea. Negative for blood in stool.   Genitourinary: Negative.    Skin: Negative.    Neurological:  Positive for headaches. Negative for syncope and speech difficulty.   Hematological: Negative.    Psychiatric/Behavioral: Negative.     Objective:     Vital Signs (Most Recent):  Temp: 98.6 °F (37 °C) (02/13/23 0705)  Pulse: 109 (02/13/23 1136)  Resp: 20 (02/13/23 1136)  BP: (!) 147/74 (02/13/23 0949)  SpO2: 99 % (02/13/23 1136)   Vital Signs (24h Range):  Temp:  [98.6 °F (37 °C)-99.4 °F (37.4 °C)] 98.6 °F (37 °C)  Pulse:  [100-149] 109  Resp:  [18-37] 20  SpO2:  [98 %-100 %] 99 %  BP: (106-166)/(58-81) 147/74     Weight: 97.1 kg (214 lb)  Body mass index is 34.54 kg/m².    No intake or output data in the 24 hours ending 02/13/23 1345    Physical Exam  Vitals and nursing note reviewed.   Constitutional:       General: She is not in acute distress.     Appearance: She is obese. She is ill-appearing.   HENT:      Head: Normocephalic.      Nose: Rhinorrhea present.      Mouth/Throat:      Mouth: Mucous membranes are moist.      Pharynx: Oropharynx is clear. No oropharyngeal exudate.   Eyes:      General: No scleral icterus.     Conjunctiva/sclera: Conjunctivae normal.   Cardiovascular:      Rate and Rhythm: Regular rhythm. Tachycardia present.   Pulmonary:      Effort: Pulmonary effort is  normal. No respiratory distress.      Breath sounds: Wheezing and rhonchi present.   Chest:      Chest wall: Tenderness present.   Abdominal:      Palpations: Abdomen is soft.      Tenderness: There is abdominal tenderness. There is no guarding or rebound.   Musculoskeletal:      Right lower leg: Edema (2+ mid thigh) present.      Left lower leg: Edema (2+ mid thigh) present.   Skin:     General: Skin is warm.      Capillary Refill: Capillary refill takes 2 to 3 seconds.      Coloration: Skin is not jaundiced.   Neurological:      General: No focal deficit present.      Mental Status: She is alert and oriented to person, place, and time. Mental status is at baseline.   Psychiatric:         Mood and Affect: Mood normal.         Behavior: Behavior normal.       Vents:  Oxygen Concentration (%): 35 (02/13/23 0800)    Lines/Drains/Airways       Drain  Duration             Female External Urinary Catheter 02/13/23 0540 <1 day              Peripheral Intravenous Line  Duration                  Peripheral IV - Single Lumen 02/13/23 0100 20 G Anterior;Left Forearm <1 day         Peripheral IV - Single Lumen 02/13/23 0513 20 G Anterior;Distal;Right Forearm <1 day                    Significant Labs:    CBC/Anemia Profile:  Recent Labs   Lab 02/13/23  0159 02/13/23  0727   WBC 13.00* 9.67   HGB 8.5* 8.5*   HCT 25.9* 26.5*    251   * 102*   RDW 17.3* 17.2*        Chemistries:  Recent Labs   Lab 02/13/23  0113 02/13/23  0727   * 131*   K 5.4* 3.9   CL 97 96   CO2 16* 18*   BUN 8 8   CREATININE 0.8 0.8   CALCIUM 7.1* 7.3*   ALBUMIN 2.5*  --    PROT 6.2  --    BILITOT 0.5  --    ALKPHOS 101  --    ALT 12  --    AST 38  --    MG  --  <0.7*   PHOS  --  3.2       ABGs:   Recent Labs   Lab 02/13/23  0740   PH 7.494*   PCO2 25.6*   HCO3 19.7*   POCSATURATED 99   BE -4       Significant Imaging:   CXR: I have reviewed all pertinent results/findings within the past 24 hours and my personal findings are:  Some  bilateral air space disease, increased in the lower lung zones with some loss of the L dez-diaphragm and meniscus     Assessment/Plan:     Diarrhea  Diarrheal illness appears to have abated so far during this admission. Given recent bout of c.diff status post oral vancomycin treatment, would consider repeat studies for diffuse diarrheal illness and consult GI/ID if this continues for recurrent c. Diff treatment. She is not currently in need of pressor support or showing evidence of shock from this illness.  - continue to monitor  - consider further investigative studies if she continues to have diarrhea while inpatient     Recurrent adenocarcinoma of left lung  Recurrent stage 1 disease with unknown staging as there was not follow up biopsy performed. She was undergoing chemo/rads for this in December but she stopped chemotherapy and radiation in January per patients request. Plans for repeat surveillance CT scan in 1 month.     Acute on chronic respiratory failure with hypoxia and hypercapnia  Patient with concern for acute on chronic hypoxic respiratory failure. SpO2 reportedly low in the ED and placed on BiPAP overnight. ABG this morning showed respiratory alkylosis without evidence of new hypoxemia. She was also given IV lasix and albuterol nebulizers along with initial CAP treatment x 1 dose. Based on her physical exam, CXR, and history, it appears she may be volume overloaded, despite her diarrheal illness, from self-discontinuation of her torsemide over the last few days. She feels much better and is back to her baseline of 2L O2 this morning. She has cacophonous breath sounds, likely related to her volume status. No pleural effusion was appreciated on bedside ultrasound.   - continue diuretics at this time for volume overload  - duo nebs PRN  - continue home supplemental O2    - goal o2 sats of 88-92%  - continue home LABA/LAMA/ICS  - agree with discontinuation of CAP coverage given negative procalcitonin,  although would restart this if she spikes a fever  - follow up repeat TTE pending      If patient remains stable on 2L NC, would be okay with transferring her to the floor with recommendations for BiPAP at night until she is effectively diuresed. She has remained vitally stable in the ICU without evidence of hypoxemia or hypercapnia.      Justin Ellerman, MD  Pulmonology  [facility] - Intensive Care ([place])

## 2023-02-13 NOTE — CARE UPDATE
02/13/23 0549   Patient Assessment/Suction   Level of Consciousness (AVPU) alert   Respiratory Effort Normal;Unlabored   Expansion/Accessory Muscles/Retractions no retractions;expansion symmetric;no use of accessory muscles   All Lung Fields Breath Sounds diminished   Rhythm/Pattern, Respiratory depth regular;pattern regular;unlabored   Cough Frequency no cough   Skin Integrity   $ Wound Care Tech Time 15 min   Area Observed Left;Right;Behind ear;Cheek   Skin Appearance without discoloration   Barrier used? Liquid Filled Cushion   PRE-TX-O2   Device (Oxygen Therapy) BIPAP   $ Is the patient on Low Flow Oxygen? Yes   Oxygen Concentration (%) 35   SpO2 100 %   Pulse Oximetry Type Continuous   $ Pulse Oximetry - Multiple Charge Pulse Oximetry - Multiple   Pulse (!) 123   Resp (!) 27   /80   Ready to Wean/Extubation Screen   FIO2<=50 (chart decimal) 0.35   Preset CPAP/BiPAP Settings   Mode Of Delivery BiPAP S/T   $ Is patient using? Yes   Size of Mask Large   Sized Appropriately? Yes   Equipment Type V60   Ipap 10   EPAP (cm H2O) 5   Pressure Support (cm H2O) 5   Set Rate (Breaths/Min) 10   ITime (sec) 1   Rise Time (sec) 3   Patient CPAP/BiPAP Settings   Timed Inspiration (Sec) 1   IPAP Rise Time (sec) 3   RR Total (Breaths/Min) 16   Tidal Volume (mL) 839   VE Minute Ventilation (L/min) 10.4 L/min   Peak Inspiratory Pressure (cm H2O) 18   TiTOT (%) 56   Total Leak (L/Min) 13   Patient Trigger - ST Mode Only (%) 89   CPAP/BiPAP Alarms   High Pressure (cm H2O) 45   Low Pressure (cm H2O) 4   Low Pressure Delay (Sec) 20   Minute Ventilation (L/Min) 3   High RR (breaths/min) 50   Low RR (breaths/min) 8   Apnea (Sec) 20

## 2023-02-13 NOTE — ASSESSMENT & PLAN NOTE
- s/p chemo and radiation  - has plans for further imaging coming up in late fab/early march per patient

## 2023-02-14 LAB
ANION GAP SERPL CALC-SCNC: 9 MMOL/L (ref 8–16)
AV INDEX (PROSTH): 0.49
AV MEAN GRADIENT: 22 MMHG
AV PEAK GRADIENT: 37 MMHG
AV VALVE AREA: 1.5 CM2
AV VELOCITY RATIO: 0.45
BSA FOR ECHO PROCEDURE: 2.13 M2
BUN SERPL-MCNC: 11 MG/DL (ref 8–23)
CALCIUM SERPL-MCNC: 7.1 MG/DL (ref 8.7–10.5)
CHLORIDE SERPL-SCNC: 95 MMOL/L (ref 95–110)
CO2 SERPL-SCNC: 25 MMOL/L (ref 23–29)
CREAT SERPL-MCNC: 0.9 MG/DL (ref 0.5–1.4)
CV ECHO LV RWT: 0.54 CM
DOP CALC AO PEAK VEL: 3.06 M/S
DOP CALC AO VTI: 44.7 CM
DOP CALC LVOT AREA: 3.1 CM2
DOP CALC LVOT DIAMETER: 1.98 CM
DOP CALC LVOT PEAK VEL: 1.38 M/S
DOP CALC LVOT STROKE VOLUME: 67.09 CM3
DOP CALCLVOT PEAK VEL VTI: 21.8 CM
E WAVE DECELERATION TIME: 223.86 MSEC
E/E' RATIO: 34.5 M/S
ECHO LV POSTERIOR WALL: 0.97 CM (ref 0.6–1.1)
EJECTION FRACTION: 60 %
EST. GFR  (NO RACE VARIABLE): >60 ML/MIN/1.73 M^2
FRACTIONAL SHORTENING: 37 % (ref 28–44)
GLUCOSE SERPL-MCNC: 149 MG/DL (ref 70–110)
INTERVENTRICULAR SEPTUM: 0.98 CM (ref 0.6–1.1)
IVC DIAMETER: 28 CM
IVRT: 77.07 MSEC
LA MAJOR: 6.21 CM
LA MINOR: 5.67 CM
LA WIDTH: 4.5 CM
LEFT ATRIUM SIZE: 3.6 CM
LEFT ATRIUM VOLUME INDEX MOD: 38.8 ML/M2
LEFT ATRIUM VOLUME INDEX: 39.6 ML/M2
LEFT ATRIUM VOLUME MOD: 80 CM3
LEFT ATRIUM VOLUME: 81.62 CM3
LEFT INTERNAL DIMENSION IN SYSTOLE: 2.28 CM (ref 2.1–4)
LEFT VENTRICLE DIASTOLIC VOLUME INDEX: 26.4 ML/M2
LEFT VENTRICLE DIASTOLIC VOLUME: 54.38 ML
LEFT VENTRICLE MASS INDEX: 50 G/M2
LEFT VENTRICLE SYSTOLIC VOLUME INDEX: 8.6 ML/M2
LEFT VENTRICLE SYSTOLIC VOLUME: 17.74 ML
LEFT VENTRICULAR INTERNAL DIMENSION IN DIASTOLE: 3.6 CM (ref 3.5–6)
LEFT VENTRICULAR MASS: 104.02 G
LV LATERAL E/E' RATIO: 27.6 M/S
LV SEPTAL E/E' RATIO: 46 M/S
LVOT MG: 4.66 MMHG
LVOT MV: 1.02 CM/S
MAGNESIUM SERPL-MCNC: 1.3 MG/DL (ref 1.6–2.6)
MV PEAK E VEL: 1.38 M/S
MV STENOSIS PRESSURE HALF TIME: 64.92 MS
MV VALVE AREA P 1/2 METHOD: 3.39 CM2
PHOSPHATE SERPL-MCNC: 2.6 MG/DL (ref 2.7–4.5)
PISA MRMAX VEL: 5.58 M/S
PISA TR MAX VEL: 3.47 M/S
POTASSIUM SERPL-SCNC: 4 MMOL/L (ref 3.5–5.1)
PV PEAK S VEL: 0.88 M/S
PV PEAK VELOCITY: 1.57 CM/S
RA MAJOR: 5.26 CM
RA WIDTH: 3.8 CM
SODIUM SERPL-SCNC: 129 MMOL/L (ref 136–145)
TDI LATERAL: 0.05 M/S
TDI SEPTAL: 0.03 M/S
TDI: 0.04 M/S
TR MAX PG: 48 MMHG

## 2023-02-14 PROCEDURE — 80048 BASIC METABOLIC PNL TOTAL CA: CPT | Performed by: NURSE PRACTITIONER

## 2023-02-14 PROCEDURE — 94640 AIRWAY INHALATION TREATMENT: CPT

## 2023-02-14 PROCEDURE — 99233 SBSQ HOSP IP/OBS HIGH 50: CPT | Mod: GC,,, | Performed by: INTERNAL MEDICINE

## 2023-02-14 PROCEDURE — 84100 ASSAY OF PHOSPHORUS: CPT | Performed by: NURSE PRACTITIONER

## 2023-02-14 PROCEDURE — 63600175 PHARM REV CODE 636 W HCPCS: Performed by: SURGERY

## 2023-02-14 PROCEDURE — 94761 N-INVAS EAR/PLS OXIMETRY MLT: CPT

## 2023-02-14 PROCEDURE — 25000003 PHARM REV CODE 250: Performed by: INTERNAL MEDICINE

## 2023-02-14 PROCEDURE — 27000221 HC OXYGEN, UP TO 24 HOURS

## 2023-02-14 PROCEDURE — 63600175 PHARM REV CODE 636 W HCPCS: Performed by: INTERNAL MEDICINE

## 2023-02-14 PROCEDURE — 25000003 PHARM REV CODE 250: Performed by: NURSE PRACTITIONER

## 2023-02-14 PROCEDURE — 99233 PR SUBSEQUENT HOSPITAL CARE,LEVL III: ICD-10-PCS | Mod: ,,, | Performed by: INTERNAL MEDICINE

## 2023-02-14 PROCEDURE — 20000000 HC ICU ROOM

## 2023-02-14 PROCEDURE — 99233 SBSQ HOSP IP/OBS HIGH 50: CPT | Mod: ,,, | Performed by: INTERNAL MEDICINE

## 2023-02-14 PROCEDURE — 63600175 PHARM REV CODE 636 W HCPCS: Performed by: NURSE PRACTITIONER

## 2023-02-14 PROCEDURE — 99900035 HC TECH TIME PER 15 MIN (STAT)

## 2023-02-14 PROCEDURE — 36415 COLL VENOUS BLD VENIPUNCTURE: CPT | Performed by: NURSE PRACTITIONER

## 2023-02-14 PROCEDURE — 25000003 PHARM REV CODE 250

## 2023-02-14 PROCEDURE — 25000242 PHARM REV CODE 250 ALT 637 W/ HCPCS: Performed by: NURSE PRACTITIONER

## 2023-02-14 PROCEDURE — 94660 CPAP INITIATION&MGMT: CPT

## 2023-02-14 PROCEDURE — 25000003 PHARM REV CODE 250: Performed by: HOSPITALIST

## 2023-02-14 PROCEDURE — 83735 ASSAY OF MAGNESIUM: CPT | Performed by: NURSE PRACTITIONER

## 2023-02-14 PROCEDURE — 99233 PR SUBSEQUENT HOSPITAL CARE,LEVL III: ICD-10-PCS | Mod: GC,,, | Performed by: INTERNAL MEDICINE

## 2023-02-14 RX ORDER — TRAZODONE HYDROCHLORIDE 50 MG/1
50 TABLET ORAL ONCE
Status: COMPLETED | OUTPATIENT
Start: 2023-02-14 | End: 2023-02-14

## 2023-02-14 RX ORDER — TRAZODONE HYDROCHLORIDE 100 MG/1
100 TABLET ORAL NIGHTLY
Status: DISCONTINUED | OUTPATIENT
Start: 2023-02-14 | End: 2023-02-20 | Stop reason: HOSPADM

## 2023-02-14 RX ORDER — MAGNESIUM SULFATE HEPTAHYDRATE 40 MG/ML
2 INJECTION, SOLUTION INTRAVENOUS ONCE
Status: COMPLETED | OUTPATIENT
Start: 2023-02-14 | End: 2023-02-14

## 2023-02-14 RX ORDER — FUROSEMIDE 10 MG/ML
60 INJECTION INTRAMUSCULAR; INTRAVENOUS 2 TIMES DAILY
Status: DISCONTINUED | OUTPATIENT
Start: 2023-02-14 | End: 2023-02-17

## 2023-02-14 RX ADMIN — LOSARTAN POTASSIUM 100 MG: 50 TABLET, FILM COATED ORAL at 08:02

## 2023-02-14 RX ADMIN — HEPARIN SODIUM 5000 UNITS: 5000 INJECTION INTRAVENOUS; SUBCUTANEOUS at 09:02

## 2023-02-14 RX ADMIN — TRAZODONE HYDROCHLORIDE 50 MG: 50 TABLET ORAL at 02:02

## 2023-02-14 RX ADMIN — OXYCODONE HYDROCHLORIDE 5 MG: 5 TABLET ORAL at 08:02

## 2023-02-14 RX ADMIN — FUROSEMIDE 60 MG: 10 INJECTION, SOLUTION INTRAMUSCULAR; INTRAVENOUS at 05:02

## 2023-02-14 RX ADMIN — GABAPENTIN 300 MG: 300 CAPSULE ORAL at 08:02

## 2023-02-14 RX ADMIN — OXYCODONE HYDROCHLORIDE 5 MG: 5 TABLET ORAL at 07:02

## 2023-02-14 RX ADMIN — IPRATROPIUM BROMIDE AND ALBUTEROL SULFATE 3 ML: .5; 3 SOLUTION RESPIRATORY (INHALATION) at 02:02

## 2023-02-14 RX ADMIN — TRAZODONE HYDROCHLORIDE 100 MG: 50 TABLET ORAL at 09:02

## 2023-02-14 RX ADMIN — IPRATROPIUM BROMIDE AND ALBUTEROL SULFATE 3 ML: .5; 3 SOLUTION RESPIRATORY (INHALATION) at 08:02

## 2023-02-14 RX ADMIN — Medication 400 MG: at 09:02

## 2023-02-14 RX ADMIN — MUPIROCIN: 20 OINTMENT TOPICAL at 08:02

## 2023-02-14 RX ADMIN — PANTOPRAZOLE SODIUM 40 MG: 40 TABLET, DELAYED RELEASE ORAL at 08:02

## 2023-02-14 RX ADMIN — ATORVASTATIN CALCIUM 40 MG: 20 TABLET, FILM COATED ORAL at 08:02

## 2023-02-14 RX ADMIN — HEPARIN SODIUM 5000 UNITS: 5000 INJECTION INTRAVENOUS; SUBCUTANEOUS at 05:02

## 2023-02-14 RX ADMIN — Medication 250 MG: at 08:02

## 2023-02-14 RX ADMIN — DIBASIC SODIUM PHOSPHATE, MONOBASIC POTASSIUM PHOSPHATE AND MONOBASIC SODIUM PHOSPHATE 1 TABLET: 852; 155; 130 TABLET ORAL at 08:02

## 2023-02-14 RX ADMIN — FLUTICASONE FUROATE AND VILANTEROL TRIFENATATE 1 PUFF: 100; 25 POWDER RESPIRATORY (INHALATION) at 08:02

## 2023-02-14 RX ADMIN — OXYCODONE HYDROCHLORIDE 5 MG: 5 TABLET ORAL at 01:02

## 2023-02-14 RX ADMIN — FUROSEMIDE 60 MG: 10 INJECTION, SOLUTION INTRAMUSCULAR; INTRAVENOUS at 09:02

## 2023-02-14 RX ADMIN — GABAPENTIN 300 MG: 300 CAPSULE ORAL at 09:02

## 2023-02-14 RX ADMIN — MUPIROCIN: 20 OINTMENT TOPICAL at 09:02

## 2023-02-14 RX ADMIN — Medication 1000 UNITS: at 08:02

## 2023-02-14 RX ADMIN — FUROSEMIDE 60 MG: 10 INJECTION, SOLUTION INTRAMUSCULAR; INTRAVENOUS at 03:02

## 2023-02-14 RX ADMIN — THERA TABS 1 TABLET: TAB at 08:02

## 2023-02-14 RX ADMIN — MAGNESIUM SULFATE HEPTAHYDRATE 2 G: 40 INJECTION, SOLUTION INTRAVENOUS at 08:02

## 2023-02-14 RX ADMIN — HEPARIN SODIUM 5000 UNITS: 5000 INJECTION INTRAVENOUS; SUBCUTANEOUS at 02:02

## 2023-02-14 RX ADMIN — COLLAGENASE SANTYL: 250 OINTMENT TOPICAL at 08:02

## 2023-02-14 RX ADMIN — DULOXETINE 60 MG: 30 CAPSULE, DELAYED RELEASE ORAL at 08:02

## 2023-02-14 RX ADMIN — GABAPENTIN 300 MG: 300 CAPSULE ORAL at 02:02

## 2023-02-14 RX ADMIN — METOPROLOL SUCCINATE 50 MG: 50 TABLET, EXTENDED RELEASE ORAL at 08:02

## 2023-02-14 NOTE — PROGRESS NOTES
[facility] Intensive Care [facility]  Pulmonology  Progress Note    Patient Name: Nalini Varma  MRN: [id number]  Admission Date: 2/13/2023  Hospital Length of Stay: 1 days  Code Status: Full Code  Attending Provider: Paras Antonio MD  Primary Care Provider: Yane Sahni MD   Principal Problem: Acute on chronic diastolic heart failure    Subjective:     Interval History: Patient reports improvement in her shortness of breath symptoms. She can lay back a littler farther in bed than before. She also reports some decreased lower extremity swelling.      Objective:     Vital Signs (Most Recent):  Temp: 97.8 °F (36.6 °C) (02/14/23 0705)  Pulse: 110 (02/14/23 0812)  Resp: (!) 24 (02/14/23 0836)  BP: (!) 147/84 (02/14/23 0705)  SpO2: 99 % (02/14/23 0812)   Vital Signs (24h Range):  Temp:  [97.7 °F (36.5 °C)-98.5 °F (36.9 °C)] 97.8 °F (36.6 °C)  Pulse:  [] 110  Resp:  [15-38] 24  SpO2:  [94 %-100 %] 99 %  BP: (126-181)/() 147/84     Weight: 96.1 kg (211 lb 13.8 oz)  Body mass index is 34.2 kg/m².      Intake/Output Summary (Last 24 hours) at 2/14/2023 1006  Last data filed at 2/14/2023 0605  Gross per 24 hour   Intake 1376.4 ml   Output 2650 ml   Net -1273.6 ml       Physical Exam  Vitals and nursing note reviewed.   Constitutional:       General: She is not in acute distress.     Appearance: She is obese. She is ill-appearing.   Cardiovascular:      Rate and Rhythm: Regular rhythm. Tachycardia present.   Pulmonary:      Effort: Pulmonary effort is normal. No respiratory distress.      Breath sounds: Wheezing and rhonchi present.   Chest:      Chest wall: Tenderness present.   Abdominal:      Palpations: Abdomen is soft.      Tenderness: There is abdominal tenderness. There is no guarding or rebound.   Musculoskeletal:      Right lower leg: Edema (2+ mid thigh) present.      Left lower leg: Edema (2+ mid thigh) present.   Skin:     General: Skin is warm.      Capillary Refill: Capillary refill takes 2  to 3 seconds.      Coloration: Skin is not jaundiced.   Neurological:      General: No focal deficit present.      Mental Status: She is alert and oriented to person, place, and time. Mental status is at baseline.   Psychiatric:         Mood and Affect: Mood normal.         Behavior: Behavior normal.     Review of Systems    Vents:  Oxygen Concentration (%): 35 (02/13/23 0800)    Lines/Drains/Airways       Drain  Duration             Female External Urinary Catheter 02/13/23 0540 1 day              Peripheral Intravenous Line  Duration                  Peripheral IV - Single Lumen 02/13/23 0100 20 G Anterior;Left Forearm 1 day         Peripheral IV - Single Lumen 02/13/23 0513 20 G Anterior;Distal;Right Forearm 1 day                    Significant Labs:    CBC/Anemia Profile:  Recent Labs   Lab 02/13/23  0159 02/13/23  0727   WBC 13.00* 9.67   HGB 8.5* 8.5*   HCT 25.9* 26.5*    251   * 102*   RDW 17.3* 17.2*        Chemistries:  Recent Labs   Lab 02/13/23  0113 02/13/23  0727 02/14/23  0531   * 131* 129*   K 5.4* 3.9 4.0   CL 97 96 95   CO2 16* 18* 25   BUN 8 8 11   CREATININE 0.8 0.8 0.9   CALCIUM 7.1* 7.3* 7.1*   ALBUMIN 2.5*  --   --    PROT 6.2  --   --    BILITOT 0.5  --   --    ALKPHOS 101  --   --    ALT 12  --   --    AST 38  --   --    MG  --  <0.7* 1.3*   PHOS  --  3.2 2.6*       All pertinent labs within the past 24 hours have been reviewed.    Significant Imaging:  I have reviewed all pertinent imaging results/findings within the past 24 hours.    Assessment/Plan:     Diarrhea  Diarrheal illness appears to have abated so far during this admission. Given recent bout of c.diff status post oral vancomycin treatment, would consider repeat studies for diffuse diarrheal illness and consult GI/ID if this continues.  - consider further investigative studies if she continues to have diarrhea while inpatient     Recurrent adenocarcinoma of left lung  Recurrent stage 1 disease with unknown  staging as there was not follow up biopsy performed. She was undergoing chemo/rads for this in December but she stopped chemotherapy and radiation in January per patients request. Plans for repeat surveillance CT scan in 1 month.     Acute on chronic respiratory failure with hypoxia and hypercapnia  Patient with significant improvement after IV diuresis down 2L since admission. She is back on her home O2 requirements and did not want to wear BiPAP overnight. From a respiratory standpoint, she is back at her baseline but could continue to use aggressive IV diuresis.   - continue diuretics  - duo nebs PRN  - continue home supplemental O2    - goal o2 sats of 88-92%  - continue home LABA/LAMA/ICS      Improving edema, respiratory support, and symptoms. Patient appears stable for transfer to the floor with no further NIV required as she is back to her baseline home O2 requirements and not in distress. Would continue diuresis as much as possible prior to discharge. Critical care to sign off. Please call for any further concerns.     Justin Ellerman, MD  Pulmonology  [facility] - Intensive Care ([place])

## 2023-02-14 NOTE — ASSESSMENT & PLAN NOTE
"Patient with COPD on Home O2 at 3L NC, Recurrent Lung Cancer, and HFpEF.  Recently admitted for COPD exacerbation.  Patient presented with shortness of breath that started over the last few days PTA, as well as intermittent diarrhea.  On arrival, oxygen saturations were 50% and placed on BiPAP.  CXR on admission with bibasilar opacities and mild increased interstitial attenuation suggestive for mild pulmonary edema, more pronounced within the lower lung zones.   Labs on admission  and Procal normal .  Patient started on IV Lasix with some improvement and off BiPAP.     Patient seen by Pulmonary - "Improving edema, respiratory support, and symptoms. Patient appears stable for transfer to the floor with no further NIV required as she is back to her baseline home O2 requirements and not in distress. Would continue diuresis as much as possible prior to discharge. Critical care to sign off. Please call for any further concerns".     Plan:  Continue with IV Lasix  Continue with O2 at tolerated to keep SpO2 >92%   Continue with Duonebs Q4 prn  Strict intake and output  Transfer to floor when stable  "

## 2023-02-14 NOTE — ASSESSMENT & PLAN NOTE
"Per Pulmonary - "Recurrent stage 1 disease with unknown staging as there was not follow up biopsy performed. She was undergoing chemo/rads for this in December but she stopped chemotherapy and radiation in January per patients request. Plans for repeat surveillance CT scan in 1 month"  "

## 2023-02-14 NOTE — PLAN OF CARE
Problem: Adult Inpatient Plan of Care  Goal: Plan of Care Review  Outcome: Ongoing, Progressing  Flowsheets (Taken 2/14/2023 0438)  Plan of Care Reviewed With: patient  Goal: Patient-Specific Goal (Individualized)  Outcome: Ongoing, Progressing  Goal: Absence of Hospital-Acquired Illness or Injury  Outcome: Ongoing, Progressing  Intervention: Identify and Manage Fall Risk  Flowsheets (Taken 2/14/2023 0438)  Safety Promotion/Fall Prevention:   assistive device/personal item within reach   bed alarm set   medications reviewed   pulse ox   side rails raised x 3   instructed to call staff for mobility  Intervention: Prevent Skin Injury  Flowsheets (Taken 2/14/2023 0438)  Body Position: weight shifting  Skin Protection:   incontinence pads utilized   tubing/devices free from skin contact  Goal: Optimal Comfort and Wellbeing  Outcome: Ongoing, Progressing  Intervention: Monitor Pain and Promote Comfort  Flowsheets (Taken 2/14/2023 0438)  Pain Management Interventions:   care clustered   quiet environment facilitated  Intervention: Provide Person-Centered Care  Flowsheets (Taken 2/14/2023 0438)  Trust Relationship/Rapport:   care explained   choices provided   questions answered   questions encouraged   reassurance provided     Problem: Fluid and Electrolyte Imbalance (Acute Kidney Injury/Impairment)  Goal: Fluid and Electrolyte Balance  Outcome: Ongoing, Progressing  Intervention: Monitor and Manage Fluid and Electrolyte Balance  Flowsheets (Taken 2/14/2023 0438)  Fluid/Electrolyte Management: fluids restricted     Problem: Renal Function Impairment (Acute Kidney Injury/Impairment)  Goal: Effective Renal Function  Outcome: Ongoing, Progressing  Intervention: Monitor and Support Renal Function  Flowsheets (Taken 2/14/2023 0438)  Medication Review/Management: medications reviewed     Problem: Skin Injury Risk Increased  Goal: Skin Health and Integrity  Outcome: Ongoing, Progressing  Intervention: Optimize Skin  Protection  Flowsheets (Taken 2/14/2023 0438)  Pressure Reduction Techniques: frequent weight shift encouraged  Skin Protection:   incontinence pads utilized   tubing/devices free from skin contact  Head of Bed (HOB) Positioning: HOB at 30-45 degrees     Problem: Coping Ineffective  Goal: Effective Coping  Outcome: Ongoing, Progressing     Problem: Infection  Goal: Absence of Infection Signs and Symptoms  Outcome: Ongoing, Progressing  Intervention: Prevent or Manage Infection  Flowsheets (Taken 2/14/2023 0438)  Isolation Precautions:   contact   precautions initiated

## 2023-02-14 NOTE — ASSESSMENT & PLAN NOTE
Patient recently completed a course of po Vanc for C Diff.  Had recurrent diarrhea at home, but none since admission.   -If no diarrhea in 24 hours of admission, then d/c testing and isolation

## 2023-02-14 NOTE — SUBJECTIVE & OBJECTIVE
Interval History: Patient is awake and alert this morning.  Feeling better since admission, but still with significant MEDELLIN.  No chest pain.  LE edema improving.  No diarrhea since admission.    Review of Systems   Constitutional:  Positive for fatigue. Negative for fever.   HENT:  Negative for congestion, ear pain, rhinorrhea and sinus pressure.    Eyes:  Negative for pain and discharge.   Respiratory:  Positive for shortness of breath (slightly improved). Negative for cough, chest tightness and wheezing.    Cardiovascular:  Positive for leg swelling (improving). Negative for chest pain and palpitations.   Gastrointestinal:  Positive for diarrhea (none since admission). Negative for abdominal distention, abdominal pain, nausea and vomiting.   Endocrine: Negative for cold intolerance and heat intolerance.   Genitourinary:  Negative for difficulty urinating, flank pain, frequency, hematuria and urgency.   Musculoskeletal:  Negative for arthralgias, joint swelling and myalgias.   Skin:  Negative for rash.   Allergic/Immunologic: Negative for environmental allergies and food allergies.   Neurological:  Negative for dizziness, weakness, light-headedness and headaches.   Hematological:  Does not bruise/bleed easily.   Psychiatric/Behavioral:  Negative for agitation, behavioral problems and decreased concentration. The patient is nervous/anxious.    Objective:     Vital Signs (Most Recent):  Temp: 97.8 °F (36.6 °C) (02/14/23 0705)  Pulse: 79 (02/14/23 1300)  Resp: 13 (02/14/23 1300)  BP: (!) 121/58 (02/14/23 1300)  SpO2: 100 % (02/14/23 1300)   Vital Signs (24h Range):  Temp:  [97.7 °F (36.5 °C)-98.2 °F (36.8 °C)] 97.8 °F (36.6 °C)  Pulse:  [] 79  Resp:  [13-38] 13  SpO2:  [94 %-100 %] 100 %  BP: (121-181)/() 121/58     Weight: 96.1 kg (211 lb 13.8 oz)  Body mass index is 34.2 kg/m².    Intake/Output Summary (Last 24 hours) at 2/14/2023 1527  Last data filed at 2/14/2023 1105  Gross per 24 hour   Intake 2096.4  ml   Output 2650 ml   Net -553.6 ml      Physical Exam  Constitutional:       Appearance: She is well-developed. She is ill-appearing.   HENT:      Head: Normocephalic and atraumatic.      Right Ear: External ear normal.      Left Ear: External ear normal.      Nose: Nose normal.      Mouth/Throat:      Mouth: Mucous membranes are moist.      Pharynx: Oropharynx is clear.   Eyes:      General: No scleral icterus.        Right eye: No discharge.         Left eye: No discharge.      Conjunctiva/sclera: Conjunctivae normal.   Cardiovascular:      Rate and Rhythm: Regular rhythm. Tachycardia present.      Pulses:           Radial pulses are 2+ on the right side and 2+ on the left side.      Heart sounds: Normal heart sounds.   Pulmonary:      Effort: Pulmonary effort is normal. Tachypnea present. No respiratory distress.      Breath sounds: Rales (faint bibasilar) present.   Abdominal:      General: Bowel sounds are decreased. There is no distension.      Palpations: Abdomen is soft.      Tenderness: There is no abdominal tenderness.   Musculoskeletal:         General: Normal range of motion.      Cervical back: Normal range of motion and neck supple.      Right lower leg: Edema present.      Left lower leg: Edema present.      Comments: 1+   Skin:     General: Skin is warm and dry.      Coloration: Skin is pale.   Neurological:      Mental Status: She is alert and oriented to person, place, and time.      GCS: GCS eye subscore is 4. GCS verbal subscore is 5. GCS motor subscore is 6.      Motor: Motor function is intact.   Psychiatric:         Mood and Affect: Mood normal.         Speech: Speech normal.         Behavior: Behavior normal.       Significant Labs: All pertinent labs within the past 24 hours have been reviewed.    Significant Imaging: I have reviewed all pertinent imaging results/findings within the past 24 hours.

## 2023-02-14 NOTE — ASSESSMENT & PLAN NOTE
Patient with significant improvement after IV diuresis down 2L since admission. She is back on her home O2 requirements and did not want to wear BiPAP overnight. From a respiratory standpoint, she is back at her baseline but could continue to use aggressive IV diuresis.   - continue diuretics  - duo nebs PRN  - continue home supplemental O2    - goal o2 sats of 88-92%  - continue home LABA/LAMA/ICS

## 2023-02-14 NOTE — SUBJECTIVE & OBJECTIVE
Interval History: Patient reports improvement in her shortness of breath symptoms. She can lay back a littler farther in bed than before. She also reports some decreased lower extremity swelling.      Objective:     Vital Signs (Most Recent):  Temp: 97.8 °F (36.6 °C) (02/14/23 0705)  Pulse: 110 (02/14/23 0812)  Resp: (!) 24 (02/14/23 0836)  BP: (!) 147/84 (02/14/23 0705)  SpO2: 99 % (02/14/23 0812)   Vital Signs (24h Range):  Temp:  [97.7 °F (36.5 °C)-98.5 °F (36.9 °C)] 97.8 °F (36.6 °C)  Pulse:  [] 110  Resp:  [15-38] 24  SpO2:  [94 %-100 %] 99 %  BP: (126-181)/() 147/84     Weight: 96.1 kg (211 lb 13.8 oz)  Body mass index is 34.2 kg/m².      Intake/Output Summary (Last 24 hours) at 2/14/2023 1006  Last data filed at 2/14/2023 0605  Gross per 24 hour   Intake 1376.4 ml   Output 2650 ml   Net -1273.6 ml       Physical Exam  Vitals and nursing note reviewed.   Constitutional:       General: She is not in acute distress.     Appearance: She is obese. She is ill-appearing.   Cardiovascular:      Rate and Rhythm: Regular rhythm. Tachycardia present.   Pulmonary:      Effort: Pulmonary effort is normal. No respiratory distress.      Breath sounds: Wheezing and rhonchi present.   Chest:      Chest wall: Tenderness present.   Abdominal:      Palpations: Abdomen is soft.      Tenderness: There is abdominal tenderness. There is no guarding or rebound.   Musculoskeletal:      Right lower leg: Edema (2+ mid thigh) present.      Left lower leg: Edema (2+ mid thigh) present.   Skin:     General: Skin is warm.      Capillary Refill: Capillary refill takes 2 to 3 seconds.      Coloration: Skin is not jaundiced.   Neurological:      General: No focal deficit present.      Mental Status: She is alert and oriented to person, place, and time. Mental status is at baseline.   Psychiatric:         Mood and Affect: Mood normal.         Behavior: Behavior normal.     Review of Systems    Vents:  Oxygen Concentration (%): 35  (02/13/23 0800)    Lines/Drains/Airways       Drain  Duration             Female External Urinary Catheter 02/13/23 0540 1 day              Peripheral Intravenous Line  Duration                  Peripheral IV - Single Lumen 02/13/23 0100 20 G Anterior;Left Forearm 1 day         Peripheral IV - Single Lumen 02/13/23 0513 20 G Anterior;Distal;Right Forearm 1 day                    Significant Labs:    CBC/Anemia Profile:  Recent Labs   Lab 02/13/23  0159 02/13/23  0727   WBC 13.00* 9.67   HGB 8.5* 8.5*   HCT 25.9* 26.5*    251   * 102*   RDW 17.3* 17.2*        Chemistries:  Recent Labs   Lab 02/13/23  0113 02/13/23 0727 02/14/23  0531   * 131* 129*   K 5.4* 3.9 4.0   CL 97 96 95   CO2 16* 18* 25   BUN 8 8 11   CREATININE 0.8 0.8 0.9   CALCIUM 7.1* 7.3* 7.1*   ALBUMIN 2.5*  --   --    PROT 6.2  --   --    BILITOT 0.5  --   --    ALKPHOS 101  --   --    ALT 12  --   --    AST 38  --   --    MG  --  <0.7* 1.3*   PHOS  --  3.2 2.6*       All pertinent labs within the past 24 hours have been reviewed.    Significant Imaging:  I have reviewed all pertinent imaging results/findings within the past 24 hours.

## 2023-02-14 NOTE — ASSESSMENT & PLAN NOTE
COPD with PFTs confirming diagnosis in 2020 on Home Os 3L. Does not appear to be in COPD exacerbation at this time.  -Continue triple therapy with LABA/LAMA/ICS  -Continue O2 support for goal 02 88-92%  -Discussed importance of smoking cessation

## 2023-02-14 NOTE — ASSESSMENT & PLAN NOTE
Body mass index is 34.2 kg/m². Morbid obesity complicates all aspects of disease management from diagnostic modalities to treatment. Weight loss encouraged and health benefits explained to patient.

## 2023-02-14 NOTE — ASSESSMENT & PLAN NOTE
Diarrheal illness appears to have abated so far during this admission. Given recent bout of c.diff status post oral vancomycin treatment, would consider repeat studies for diffuse diarrheal illness and consult GI/ID if this continues.  - consider further investigative studies if she continues to have diarrhea while inpatient

## 2023-02-14 NOTE — CONSULTS
[facility] - Intensive Care ([place])  Consult    History of Present Illness:  Ms Varma is a 72 year old lady who started getting short of breath at about 8 pm on the evening of admission. She called the ambulance and was brought to the hospital at midnight.   H/O diarrhea.  She carries a diagnosis of advanced COPD, Respiratory failure on home O2,recurrent adenocarcinoma lung on chemotherapy and radiation, smokes one pack daily, diastolic CHF with preserved EF, hypertension,anemia, stage 3 chronic renal failure.  Better this morning after IV furosemide and BiPAP and breathing treatment.    PTA Medications   Medication Sig    albuterol (VENTOLIN HFA) 90 mcg/actuation inhaler inhale 1-2 puffs as needed every 6 hours for wheezing and shortness of breath    ALPRAZolam (XANAX) 0.25 MG tablet Take 1 tablet (0.25 mg total) by mouth daily as needed for Anxiety.    ascorbic acid, vitamin C, (VITAMIN C) 250 MG tablet Take 250 mg by mouth once daily.    atorvastatin (LIPITOR) 40 MG tablet TAKE 1 TABLET BY MOUTH EVERY DAILY    azelastine (ASTELIN) 137 mcg (0.1 %) nasal spray Instill 1 spray (137 mcg total) by Nasal route 2 (two) times daily.    b complex vitamins capsule Take 1 capsule by mouth once daily.    biotin 10 mg Tab Take 10 mg by mouth once daily.    calcium carbonate (OS-SANDRINE) 500 mg calcium (1,250 mg) tablet Take 2 tablets (1,000 mg total) by mouth 2 (two) times daily.    cyanocobalamin, vitamin B-12, 1,000 mcg TbSR Take 1,000 mcg by mouth once daily.    denosumab (PROLIA) 60 mg/mL Syrg Inject 1 mL (60 mg total) into the skin every 6 (six) months.    DULoxetine (CYMBALTA) 60 MG capsule Take 1 capsule (60 mg total) by mouth once daily.    fluticasone (FLONASE) 50 mcg/actuation nasal spray 1 spray by Each Nare route 2 (two) times daily as needed for Rhinitis.    fluticasone propion-salmeterol 115-21 mcg/dose (ADVAIR HFA) 115-21 mcg/actuation HFAA inhaler Inhale 2 puffs into the lungs every 12 (twelve) hours.     gabapentin (NEURONTIN) 300 MG capsule Take 1 capsule (300 mg total) by mouth 3 (three) times daily.    guaiFENesin (MUCINEX) 600 mg 12 hr tablet Take 1,200 mg by mouth 2 (two) times daily as needed for Congestion.    HYDROcodone-acetaminophen (NORCO)  mg per tablet Take 1 tablet by mouth every 12 (twelve) hours as needed for Pain.    losartan (COZAAR) 100 MG tablet Take 1 tablet (100 mg total) by mouth once daily. HOLD UNTIL YOU SEE YOUR PCP    lutein 40 mg Cap Take by mouth.    magnesium oxide (MAG-OX) 400 mg (241.3 mg magnesium) tablet Take 1 tablet by mouth every 12 (twelve) hours.    metoprolol succinate (TOPROL-XL) 25 MG 24 hr tablet Take 2 tablets (50 mg total) by mouth once daily.    multivit-min/iron/folic/lutein (CENTRUM SILVER WOMEN ORAL) Take 1 tablet by mouth once daily.    ondansetron (ZOFRAN-ODT) 8 MG TbDL dissolve 1 tablet (8 mg total) by mouth every 8 (eight) hours as needed (nausea).    pantoprazole (PROTONIX) 40 MG tablet Take 1 tablet (40 mg total) by mouth once daily.    promethazine (PHENERGAN) 25 MG tablet Take 1 tablet (25 mg total) by mouth every 6 (six) hours as needed for Nausea.    torsemide (DEMADEX) 20 MG Tab Take 1 tablet (20 mg total) by mouth once daily.    traZODone (DESYREL) 100 MG tablet Take 1 tablet (100 mg total) by mouth every evening.    umeclidinium (INCRUSE ELLIPTA) 62.5 mcg/actuation inhalation capsule Inhale 1 puff into the lungs once daily. Controller    vitamin D (VITAMIN D3) 1000 units Tab Take 1 tablet (1,000 Units total) by mouth once daily.    ZINC ORAL Take by mouth.       Review of patient's allergies indicates:   Allergen Reactions    Wellbutrin [bupropion hcl] Other (See Comments)     Hyponatremia and hypomagnesia     Zoloft [sertraline] Other (See Comments)     Hyponatremia and hypomagnesia     Bananas [banana]      Emesis, and stomach cramps    Patient states she is not allergic to Banana       Past Medical History:   Diagnosis Date    Anxiety      Cervical spinal stenosis     Choledocholithiasis     Chronic obstructive pulmonary disease 03/16/2016    Degenerative disc disease of cervical spine     Diverticulosis 04/24/2018    History of alcohol abuse 03/02/2021    History of gastric ulcer     History of L3 compression fracture 12/19/2018    History of lung cancer     s/p completion of XRT in 10/2020    Hyperlipidemia     Iron deficiency anemia     Osteoarthritis     Stage III CKD 2017     Past Surgical History:   Procedure Laterality Date    BREAST CYST EXCISION Right     1967    CATARACT EXTRACTION      COLONOSCOPY  2015    COLONOSCOPY N/A 6/8/2022    Procedure: COLONOSCOPY;  Surgeon: Helio Cheema MD;  Location: [address]);  Service: Endoscopy;  Laterality: N/A;  5/25-Pt requesting Dr. Cheema-approval given per Dr. Cheema-ml  Fully vaccinated, prep instr portal -ml    CORONARY ANGIOGRAPHY N/A 7/20/2018    Procedure: ANGIOGRAM, CORONARY ARTERY;  Surgeon: Willard Roberts MD;  Location: [facility] CATH LAB;  Service: Cardiovascular;  Laterality: N/A;    ENDOSCOPIC ULTRASOUND OF UPPER GASTROINTESTINAL TRACT N/A 3/24/2021    Procedure: ULTRASOUND, UPPER GI TRACT, ENDOSCOPIC;  Surgeon: Helio Cheema MD;  Location: [address]);  Service: Endoscopy;  Laterality: N/A;    ENDOSCOPIC ULTRASOUND OF UPPER GASTROINTESTINAL TRACT N/A 4/25/2022    Procedure: ULTRASOUND, UPPER GI TRACT, ENDOSCOPIC;  Surgeon: Helio Cheema MD;  Location: [address]);  Service: Endoscopy;  Laterality: N/A;  cardiac risk assessment 1 per Dr. Ortez. see t/e 3/17-SC  3/25:new instructions via portal. home with medical transport?-SC    ERCP N/A 3/24/2021    Procedure: ERCP (ENDOSCOPIC RETROGRADE CHOLANGIOPANCREATOGRAPHY);  Surgeon: Helio Cheema MD;  Location: [address]);  Service: Endoscopy;  Laterality: N/A;    ERCP N/A 4/30/2021    Procedure: ERCP (ENDOSCOPIC RETROGRADE CHOLANGIOPANCREATOGRAPHY);  Surgeon: Boo Gray MD;  Location: [facility]  ENDO (2ND FLR);  Service: Endoscopy;  Laterality: N/A;    ERCP N/A 7/1/2021    Procedure: ERCP (ENDOSCOPIC RETROGRADE CHOLANGIOPANCREATOGRAPHY);  Surgeon: Helio Cheema MD;  Location: [facility] ENDO (2ND FLR);  Service: Endoscopy;  Laterality: N/A;  Ok for Taxi. Dr Cheema  rapid covid 1130am- tb inst email    ESOPHAGOGASTRODUODENOSCOPY  2015    ESOPHAGOGASTRODUODENOSCOPY N/A 3/4/2021    Procedure: EGD (ESOPHAGOGASTRODUODENOSCOPY);  Surgeon: Yenifer Ramirez MD;  Location: [facility] ENDO;  Service: Endoscopy;  Laterality: N/A;    ESOPHAGOGASTRODUODENOSCOPY N/A 3/24/2021    Procedure: EGD (ESOPHAGOGASTRODUODENOSCOPY);  Surgeon: Helio Cheema MD;  [facility]  Service: Endoscopy;  Laterality: N/A;    EYE SURGERY  2016    Cataracts    FRACTURE SURGERY  2014    JOINT REPLACEMENT  2007    ORIF FEMUR FRACTURE Left     TOTAL KNEE ARTHROPLASTY Left 2007    TUBAL LIGATION       Family History   Problem Relation Age of Onset    Hypertension Mother     Alzheimer's disease Mother     Dementia Mother     Depression Mother         Alzheimers    Myasthenia gravis Father     Arthritis Father         Myasthenia Gravis    Rectal cancer Father     Hypertension Brother     Arthritis Brother         Colon cancer, obese, high blood pressure    Colon cancer Brother     No Known Problems Son     Cancer Sister         Brain cancer    Miscarriages / Stillbirths Sister     Melanoma Neg Hx     Breast cancer Neg Hx      Social History     Tobacco Use    Smoking status: Some Days     Packs/day: 1.00     Years: 53.00     Pack years: 53.00     Types: Cigarettes     Start date: 4/30/1967    Smokeless tobacco: Never    Tobacco comments:     I had quit for about 6 months this past year. Then massive stress and started back up sometimes   Substance Use Topics    Alcohol use: Yes     Alcohol/week: 7.0 standard drinks     Types: 7 Drinks containing 0.5 oz of alcohol per week     Comment: at least 1 cocktail a day    Drug use: No         Physical Exam:  Chest few scattered rhonchi.   Cor: tachycardia. Soft basal ejection systolic murmur  Ext: warm.    Impression: Acute on chronic hypoxemic respiratory failure  Acute on chronic COPD  Recurrent adenocarcinoma lungs  Current smoker  Mild chronic diastolic CHF  Mild aortic stenosis  Anemia of chronic diseases  Bilateral small pleural effusions    The echo done at bedside shows concentric LVH, LVEF= 55%, mild aortic stenosis    The diuresis and Resp Rx seem to have helped.  Await chest CT.    Thank you for the opportunity of seeing Nalini Varma in consultation

## 2023-02-14 NOTE — PROGRESS NOTES
[facility]  Progress Note    Patient Name: Nalini Varma  MRN: [id number]  Patient Class: IP- Inpatient   Admission Date: 2/13/2023  Length of Stay: 1 days  Attending Physician: Paras Antonio MD  Primary Care Provider: Yane Sahni MD        Subjective:     Principal Problem:Acute on chronic diastolic heart failure        HPI:  The patient is a 72 y.o. female with a past medical history of COPD (on home O2 3L) with 53 pack year smoking hx, current smoker, lung CA, chronic anemia, anxiety, CKD Stage 3, systolic and diastolic heart failure who presents with shortness of breath that started over the last few days.  Also has some diarrhea.  On arrival, oxygen saturations were 50%.  Initially started on nasal cannula with no improvement and was placed on BiPAP.  She also received 1 breathing treatment as well as 0.4 mg of nitroglycerin spray.  On initial workup, the patient's BNP is elevated to 378, some peripheral edema.  She will be admitted for further management of her acute on chronic respiratory failure with hypoxia and acute on chronic systolic heart failure.      Overview/Hospital Course:  No notes on file    Interval History: Patient is awake and alert this morning.  Feeling better since admission, but still with significant MEDELLIN.  No chest pain.  LE edema improving.  No diarrhea since admission.    Review of Systems   Constitutional:  Positive for fatigue. Negative for fever.   HENT:  Negative for congestion, ear pain, rhinorrhea and sinus pressure.    Eyes:  Negative for pain and discharge.   Respiratory:  Positive for shortness of breath (slightly improved). Negative for cough, chest tightness and wheezing.    Cardiovascular:  Positive for leg swelling (improving). Negative for chest pain and palpitations.   Gastrointestinal:  Positive for diarrhea (none since admission). Negative for abdominal distention, abdominal pain, nausea and vomiting.   Endocrine:  Negative for cold intolerance and heat intolerance.   Genitourinary:  Negative for difficulty urinating, flank pain, frequency, hematuria and urgency.   Musculoskeletal:  Negative for arthralgias, joint swelling and myalgias.   Skin:  Negative for rash.   Allergic/Immunologic: Negative for environmental allergies and food allergies.   Neurological:  Negative for dizziness, weakness, light-headedness and headaches.   Hematological:  Does not bruise/bleed easily.   Psychiatric/Behavioral:  Negative for agitation, behavioral problems and decreased concentration. The patient is nervous/anxious.    Objective:     Vital Signs (Most Recent):  Temp: 97.8 °F (36.6 °C) (02/14/23 0705)  Pulse: 79 (02/14/23 1300)  Resp: 13 (02/14/23 1300)  BP: (!) 121/58 (02/14/23 1300)  SpO2: 100 % (02/14/23 1300)   Vital Signs (24h Range):  Temp:  [97.7 °F (36.5 °C)-98.2 °F (36.8 °C)] 97.8 °F (36.6 °C)  Pulse:  [] 79  Resp:  [13-38] 13  SpO2:  [94 %-100 %] 100 %  BP: (121-181)/() 121/58     Weight: 96.1 kg (211 lb 13.8 oz)  Body mass index is 34.2 kg/m².    Intake/Output Summary (Last 24 hours) at 2/14/2023 1527  Last data filed at 2/14/2023 1105  Gross per 24 hour   Intake 2096.4 ml   Output 2650 ml   Net -553.6 ml      Physical Exam  Constitutional:       Appearance: She is well-developed. She is ill-appearing.   HENT:      Head: Normocephalic and atraumatic.      Right Ear: External ear normal.      Left Ear: External ear normal.      Nose: Nose normal.      Mouth/Throat:      Mouth: Mucous membranes are moist.      Pharynx: Oropharynx is clear.   Eyes:      General: No scleral icterus.        Right eye: No discharge.         Left eye: No discharge.      Conjunctiva/sclera: Conjunctivae normal.   Cardiovascular:      Rate and Rhythm: Regular rhythm. Tachycardia present.      Pulses:           Radial pulses are 2+ on the right side and 2+ on the left side.      Heart sounds: Normal heart sounds.   Pulmonary:      Effort:  "Pulmonary effort is normal. Tachypnea present. No respiratory distress.      Breath sounds: Rales (faint bibasilar) present.   Abdominal:      General: Bowel sounds are decreased. There is no distension.      Palpations: Abdomen is soft.      Tenderness: There is no abdominal tenderness.   Musculoskeletal:         General: Normal range of motion.      Cervical back: Normal range of motion and neck supple.      Right lower leg: Edema present.      Left lower leg: Edema present.      Comments: 1+   Skin:     General: Skin is warm and dry.      Coloration: Skin is pale.   Neurological:      Mental Status: She is alert and oriented to person, place, and time.      GCS: GCS eye subscore is 4. GCS verbal subscore is 5. GCS motor subscore is 6.      Motor: Motor function is intact.   Psychiatric:         Mood and Affect: Mood normal.         Speech: Speech normal.         Behavior: Behavior normal.       Significant Labs: All pertinent labs within the past 24 hours have been reviewed.    Significant Imaging: I have reviewed all pertinent imaging results/findings within the past 24 hours.      Assessment/Plan:      * Acute on chronic diastolic heart failure  TTE on admission with EF of 60% and Grade III DD  -See above      Acute on chronic respiratory failure with hypoxia and hypercapnia  Patient with COPD on Home O2 at 3L NC, Recurrent Lung Cancer, and HFpEF.  Recently admitted for COPD exacerbation.  Patient presented with shortness of breath that started over the last few days PTA, as well as intermittent diarrhea.  On arrival, oxygen saturations were 50% and placed on BiPAP.  CXR on admission with bibasilar opacities and mild increased interstitial attenuation suggestive for mild pulmonary edema, more pronounced within the lower lung zones.   Labs on admission  and Procal normal .  Patient started on IV Lasix with some improvement and off BiPAP.     Patient seen by Pulmonary - "Improving edema, respiratory " "support, and symptoms. Patient appears stable for transfer to the floor with no further NIV required as she is back to her baseline home O2 requirements and not in distress. Would continue diuresis as much as possible prior to discharge. Critical care to sign off. Please call for any further concerns".     Plan:  Continue with IV Lasix  Continue with O2 at tolerated to keep SpO2 >92%   Continue with Duonebs Q4 prn  Strict intake and output  Transfer to floor when stable    Chronic obstructive pulmonary disease  COPD with PFTs confirming diagnosis in 2020 on Home Os 3L. Does not appear to be in COPD exacerbation at this time.  -Continue triple therapy with LABA/LAMA/ICS  -Continue O2 support for goal 02 88-92%  -Discussed importance of smoking cessation      Diarrhea  Patient recently completed a course of po Vanc for C Diff.  Had recurrent diarrhea at home, but none since admission.   -If no diarrhea in 24 hours of admission, then d/c testing and isolation        Advance care planning  Followed by Palliative Care.  Patient is Full Code at this time      Recurrent adenocarcinoma of left lung  Per Pulmonary - "Recurrent stage 1 disease with unknown staging as there was not follow up biopsy performed. She was undergoing chemo/rads for this in December but she stopped chemotherapy and radiation in January per patients request. Plans for repeat surveillance CT scan in 1 month"    CKD (chronic kidney disease) stage 3, GFR 30-59 ml/min  Creatinine .8, at baseline    Monitor for acute decompensation      Severe obesity  Body mass index is 34.2 kg/m². Morbid obesity complicates all aspects of disease management from diagnostic modalities to treatment. Weight loss encouraged and health benefits explained to patient.         Hyperkalemia  K- 5.4    Recheck BMP with AM labs  Lasix    Generalized anxiety disorder  Hx of anxiety, MDD - Chronic. Controlled.  -Continue home meds of Duloxetine 60mg daily    Hyperlipidemia  -Continue " Lipitor      Essential hypertension  Normotensive currently  -Continue with Home Metoprolol and Losartan        VTE Risk Mitigation (From admission, onward)         Ordered     heparin (porcine) injection 5,000 Units  Every 8 hours         02/13/23 0447     IP VTE HIGH RISK PATIENT  Once         02/13/23 0447     Place sequential compression device  Until discontinued         02/13/23 0447                Discharge Planning   FELICIANO:      Code Status: Full Code   Is the patient medically ready for discharge?:     Reason for patient still in hospital (select all that apply): Patient trending condition, Treatment and Consult recommendations  Discharge Plan A: Home with family                  Paras Antonio MD  Department of Hospital Medicine   [facility] ([place])

## 2023-02-14 NOTE — PROGRESS NOTES
Cardiology  Progress Notes            Subjective:  She says she feels better.  She has a cough, and she is aware of the wheezing.  She says she has difficulty expectorating.    Vital Signs:  Vitals:    02/14/23 0700 02/14/23 0705 02/14/23 0812 02/14/23 0836   BP: (!) 147/84 (!) 147/84     BP Location:  Right arm     Patient Position:  Sitting     Pulse: 101 109 110    Resp: (!) 30 (!) 28 20 (!) 24   Temp:  97.8 °F (36.6 °C)     TempSrc:  Oral     SpO2: 97% 99% 99%    Weight:       Height:           Physical Exam:  Chest scattered wheezing  Heart soft systolic murmur no gallop  Extremities warm    Laboratory:  CBC:   Recent Labs   Lab 02/13/23 0727   WBC 9.67   RBC 2.61*   HGB 8.5*   HCT 26.5*      *   MCH 32.6*   MCHC 32.1     BMP:   Recent Labs   Lab 02/14/23  0531   *   *   K 4.0   CL 95   CO2 25   BUN 11   CREATININE 0.9   CALCIUM 7.1*   MG 1.3*     CMP:   Recent Labs   Lab 02/13/23 0113 02/13/23 0727 02/14/23  0531   GLU 66*   < > 149*   CALCIUM 7.1*   < > 7.1*   ALBUMIN 2.5*  --   --    PROT 6.2  --   --    *   < > 129*   K 5.4*   < > 4.0   CO2 16*   < > 25   CL 97   < > 95   BUN 8   < > 11   CREATININE 0.8   < > 0.9   ALKPHOS 101  --   --    ALT 12  --   --    AST 38  --   --    BILITOT 0.5  --   --     < > = values in this interval not displayed.     LFTs:   Recent Labs   Lab 02/13/23 0113   ALT 12   AST 38   ALKPHOS 101   BILITOT 0.5   PROT 6.2   ALBUMIN 2.5*     Coagulation: No results for input(s): PT, INR, APTT in the last 168 hours.  Cardiac markers:   Recent Labs   Lab 02/13/23  0159   TROPONINI <0.006       Imaging:  X-Ray Chest AP Portable   Final Result      As above.         Electronically signed by: Chandu Louise MD   Date:    02/13/2023   Time:    01:44            Problems:   Acute on chronic hypoxemic respiratory failure  Acute on chronic COPD  Recurrent adenocarcinoma lungs  Current smoker  Mild chronic diastolic CHF  Mild aortic stenosis  Anemia of chronic  diseases  Bilateral small pleural effusions.        Plan of Care: See Orders          Willard Roberts

## 2023-02-15 LAB
ANION GAP SERPL CALC-SCNC: 7 MMOL/L (ref 8–16)
BUN SERPL-MCNC: 12 MG/DL (ref 8–23)
CALCIUM SERPL-MCNC: 7.1 MG/DL (ref 8.7–10.5)
CHLORIDE SERPL-SCNC: 95 MMOL/L (ref 95–110)
CO2 SERPL-SCNC: 32 MMOL/L (ref 23–29)
CREAT SERPL-MCNC: 0.8 MG/DL (ref 0.5–1.4)
EST. GFR  (NO RACE VARIABLE): >60 ML/MIN/1.73 M^2
GLUCOSE SERPL-MCNC: 100 MG/DL (ref 70–110)
POTASSIUM SERPL-SCNC: 3.5 MMOL/L (ref 3.5–5.1)
SODIUM SERPL-SCNC: 134 MMOL/L (ref 136–145)

## 2023-02-15 PROCEDURE — 25000003 PHARM REV CODE 250: Performed by: INTERNAL MEDICINE

## 2023-02-15 PROCEDURE — 99233 SBSQ HOSP IP/OBS HIGH 50: CPT | Mod: ,,, | Performed by: INTERNAL MEDICINE

## 2023-02-15 PROCEDURE — 99900035 HC TECH TIME PER 15 MIN (STAT)

## 2023-02-15 PROCEDURE — 80048 BASIC METABOLIC PNL TOTAL CA: CPT | Performed by: NURSE PRACTITIONER

## 2023-02-15 PROCEDURE — 94761 N-INVAS EAR/PLS OXIMETRY MLT: CPT

## 2023-02-15 PROCEDURE — 94640 AIRWAY INHALATION TREATMENT: CPT

## 2023-02-15 PROCEDURE — 25000242 PHARM REV CODE 250 ALT 637 W/ HCPCS: Performed by: INTERNAL MEDICINE

## 2023-02-15 PROCEDURE — 99233 PR SUBSEQUENT HOSPITAL CARE,LEVL III: ICD-10-PCS | Mod: ,,, | Performed by: INTERNAL MEDICINE

## 2023-02-15 PROCEDURE — 63600175 PHARM REV CODE 636 W HCPCS: Performed by: SURGERY

## 2023-02-15 PROCEDURE — 27000221 HC OXYGEN, UP TO 24 HOURS

## 2023-02-15 PROCEDURE — 25000003 PHARM REV CODE 250: Performed by: HOSPITALIST

## 2023-02-15 PROCEDURE — 63600175 PHARM REV CODE 636 W HCPCS: Performed by: HOSPITALIST

## 2023-02-15 PROCEDURE — 36415 COLL VENOUS BLD VENIPUNCTURE: CPT | Performed by: NURSE PRACTITIONER

## 2023-02-15 PROCEDURE — 25000003 PHARM REV CODE 250

## 2023-02-15 PROCEDURE — 21400001 HC TELEMETRY ROOM

## 2023-02-15 PROCEDURE — 25000003 PHARM REV CODE 250: Performed by: NURSE PRACTITIONER

## 2023-02-15 PROCEDURE — 94660 CPAP INITIATION&MGMT: CPT

## 2023-02-15 PROCEDURE — 63600175 PHARM REV CODE 636 W HCPCS: Performed by: NURSE PRACTITIONER

## 2023-02-15 RX ORDER — IPRATROPIUM BROMIDE AND ALBUTEROL SULFATE 2.5; .5 MG/3ML; MG/3ML
3 SOLUTION RESPIRATORY (INHALATION) EVERY 4 HOURS
Status: DISCONTINUED | OUTPATIENT
Start: 2023-02-15 | End: 2023-02-20 | Stop reason: HOSPADM

## 2023-02-15 RX ORDER — POTASSIUM CHLORIDE 20 MEQ/1
40 TABLET, EXTENDED RELEASE ORAL ONCE
Status: COMPLETED | OUTPATIENT
Start: 2023-02-15 | End: 2023-02-15

## 2023-02-15 RX ORDER — DILTIAZEM HYDROCHLORIDE 180 MG/1
180 CAPSULE, COATED, EXTENDED RELEASE ORAL DAILY
Status: DISCONTINUED | OUTPATIENT
Start: 2023-02-15 | End: 2023-02-20 | Stop reason: HOSPADM

## 2023-02-15 RX ADMIN — TRAZODONE HYDROCHLORIDE 100 MG: 50 TABLET ORAL at 08:02

## 2023-02-15 RX ADMIN — GABAPENTIN 300 MG: 300 CAPSULE ORAL at 08:02

## 2023-02-15 RX ADMIN — POTASSIUM CHLORIDE 40 MEQ: 1500 TABLET, EXTENDED RELEASE ORAL at 06:02

## 2023-02-15 RX ADMIN — OXYCODONE HYDROCHLORIDE 5 MG: 5 TABLET ORAL at 08:02

## 2023-02-15 RX ADMIN — ATORVASTATIN CALCIUM 40 MG: 20 TABLET, FILM COATED ORAL at 09:02

## 2023-02-15 RX ADMIN — HEPARIN SODIUM 5000 UNITS: 5000 INJECTION INTRAVENOUS; SUBCUTANEOUS at 02:02

## 2023-02-15 RX ADMIN — IPRATROPIUM BROMIDE AND ALBUTEROL SULFATE 3 ML: 2.5; .5 SOLUTION RESPIRATORY (INHALATION) at 08:02

## 2023-02-15 RX ADMIN — FUROSEMIDE 60 MG: 10 INJECTION, SOLUTION INTRAMUSCULAR; INTRAVENOUS at 09:02

## 2023-02-15 RX ADMIN — Medication 250 MG: at 09:02

## 2023-02-15 RX ADMIN — Medication 400 MG: at 09:02

## 2023-02-15 RX ADMIN — COLLAGENASE SANTYL: 250 OINTMENT TOPICAL at 10:02

## 2023-02-15 RX ADMIN — FUROSEMIDE 60 MG: 10 INJECTION, SOLUTION INTRAMUSCULAR; INTRAVENOUS at 08:02

## 2023-02-15 RX ADMIN — HEPARIN SODIUM 5000 UNITS: 5000 INJECTION INTRAVENOUS; SUBCUTANEOUS at 05:02

## 2023-02-15 RX ADMIN — IPRATROPIUM BROMIDE AND ALBUTEROL SULFATE 3 ML: 2.5; .5 SOLUTION RESPIRATORY (INHALATION) at 04:02

## 2023-02-15 RX ADMIN — HYDROMORPHONE HYDROCHLORIDE 0.5 MG: 1 INJECTION, SOLUTION INTRAMUSCULAR; INTRAVENOUS; SUBCUTANEOUS at 06:02

## 2023-02-15 RX ADMIN — IPRATROPIUM BROMIDE AND ALBUTEROL SULFATE 3 ML: 2.5; .5 SOLUTION RESPIRATORY (INHALATION) at 11:02

## 2023-02-15 RX ADMIN — MUPIROCIN: 20 OINTMENT TOPICAL at 09:02

## 2023-02-15 RX ADMIN — FLUTICASONE FUROATE AND VILANTEROL TRIFENATATE 1 PUFF: 100; 25 POWDER RESPIRATORY (INHALATION) at 07:02

## 2023-02-15 RX ADMIN — LOSARTAN POTASSIUM 100 MG: 50 TABLET, FILM COATED ORAL at 09:02

## 2023-02-15 RX ADMIN — DILTIAZEM HYDROCHLORIDE 180 MG: 180 CAPSULE, COATED, EXTENDED RELEASE ORAL at 09:02

## 2023-02-15 RX ADMIN — Medication 400 MG: at 08:02

## 2023-02-15 RX ADMIN — PANTOPRAZOLE SODIUM 40 MG: 40 TABLET, DELAYED RELEASE ORAL at 09:02

## 2023-02-15 RX ADMIN — HEPARIN SODIUM 5000 UNITS: 5000 INJECTION INTRAVENOUS; SUBCUTANEOUS at 10:02

## 2023-02-15 RX ADMIN — THERA TABS 1 TABLET: TAB at 09:02

## 2023-02-15 RX ADMIN — Medication 1000 UNITS: at 09:02

## 2023-02-15 RX ADMIN — DULOXETINE 60 MG: 30 CAPSULE, DELAYED RELEASE ORAL at 09:02

## 2023-02-15 RX ADMIN — METOPROLOL SUCCINATE 50 MG: 50 TABLET, EXTENDED RELEASE ORAL at 09:02

## 2023-02-15 RX ADMIN — GABAPENTIN 300 MG: 300 CAPSULE ORAL at 09:02

## 2023-02-15 RX ADMIN — GABAPENTIN 300 MG: 300 CAPSULE ORAL at 02:02

## 2023-02-15 RX ADMIN — MUPIROCIN: 20 OINTMENT TOPICAL at 08:02

## 2023-02-15 RX ADMIN — HYDROMORPHONE HYDROCHLORIDE 0.5 MG: 1 INJECTION, SOLUTION INTRAMUSCULAR; INTRAVENOUS; SUBCUTANEOUS at 09:02

## 2023-02-15 NOTE — SUBJECTIVE & OBJECTIVE
Interval History: Patient is awake and alert this morning.  Feeling better since admission, but more wheezing this morning.  No chest pain.  No diarrhea since admission.    Review of Systems   Constitutional:  Positive for fatigue. Negative for fever.   HENT:  Negative for congestion, ear pain, rhinorrhea and sinus pressure.    Eyes:  Negative for pain and discharge.   Respiratory:  Positive for shortness of breath (slightly improved) and wheezing. Negative for cough and chest tightness.    Cardiovascular:  Positive for leg swelling (improving). Negative for chest pain and palpitations.   Gastrointestinal:  Negative for abdominal distention, abdominal pain, diarrhea, nausea and vomiting.   Endocrine: Negative for cold intolerance and heat intolerance.   Genitourinary:  Negative for difficulty urinating, flank pain, frequency, hematuria and urgency.   Musculoskeletal:  Negative for arthralgias, joint swelling and myalgias.   Skin:  Negative for rash.   Allergic/Immunologic: Negative for environmental allergies and food allergies.   Neurological:  Negative for dizziness, weakness, light-headedness and headaches.   Hematological:  Does not bruise/bleed easily.   Psychiatric/Behavioral:  Negative for agitation, behavioral problems and decreased concentration. The patient is nervous/anxious.    Objective:     Vital Signs (Most Recent):  Temp: 99.2 °F (37.3 °C) (02/15/23 0705)  Pulse: 73 (02/15/23 1602)  Resp: 14 (02/15/23 1602)  BP: 110/62 (02/15/23 1602)  SpO2: 100 % (02/15/23 1602)   Vital Signs (24h Range):  Temp:  [97.7 °F (36.5 °C)-99.2 °F (37.3 °C)] 99.2 °F (37.3 °C)  Pulse:  [] 73  Resp:  [12-45] 14  SpO2:  [82 %-100 %] 100 %  BP: ()/() 110/62     Weight: 95.1 kg (209 lb 10.5 oz)  Body mass index is 33.84 kg/m².    Intake/Output Summary (Last 24 hours) at 2/15/2023 1627  Last data filed at 2/15/2023 1541  Gross per 24 hour   Intake 1309.67 ml   Output 3500 ml   Net -2190.33 ml        Physical  Exam  Constitutional:       Appearance: She is well-developed. She is ill-appearing.   HENT:      Head: Normocephalic and atraumatic.      Right Ear: External ear normal.      Left Ear: External ear normal.      Nose: Nose normal.      Mouth/Throat:      Mouth: Mucous membranes are moist.      Pharynx: Oropharynx is clear.   Eyes:      General: No scleral icterus.        Right eye: No discharge.         Left eye: No discharge.      Conjunctiva/sclera: Conjunctivae normal.   Cardiovascular:      Rate and Rhythm: Normal rate and regular rhythm.      Pulses:           Radial pulses are 2+ on the right side and 2+ on the left side.      Heart sounds: Normal heart sounds.   Pulmonary:      Effort: Pulmonary effort is normal. No tachypnea or respiratory distress.      Breath sounds: Wheezing present. No rales.   Abdominal:      General: Bowel sounds are decreased. There is no distension.      Palpations: Abdomen is soft.      Tenderness: There is no abdominal tenderness.   Musculoskeletal:         General: Normal range of motion.      Cervical back: Normal range of motion and neck supple.      Right lower leg: Edema present.      Left lower leg: Edema present.      Comments: 1+   Skin:     General: Skin is warm and dry.      Coloration: Skin is pale.   Neurological:      Mental Status: She is alert and oriented to person, place, and time.      GCS: GCS eye subscore is 4. GCS verbal subscore is 5. GCS motor subscore is 6.      Motor: Motor function is intact.   Psychiatric:         Mood and Affect: Mood normal.         Speech: Speech normal.         Behavior: Behavior normal.       Significant Labs: All pertinent labs within the past 24 hours have been reviewed.    Significant Imaging: I have reviewed all pertinent imaging results/findings within the past 24 hours.

## 2023-02-15 NOTE — ASSESSMENT & PLAN NOTE
COPD with PFTs confirming diagnosis in 2020 on Home O2 3L. Did not appear to be in COPD exacerbation on admission, but more expiratory wheezing today.  -Start Standing DuoNebs q4 - consider steroids if not improving  -Continue triple therapy with LABA/LAMA/ICS  -Continue O2 support for goal 02 88-92%  -Discussed importance of smoking cessation

## 2023-02-15 NOTE — PROGRESS NOTES
"Cardiology  Progress Notes            Subjective: "I am feeling better".  Breathing is more comfortable she continues to have wheezing and congestion.  Says she is unable to expectorate.    Vital Signs:  Vitals:    02/15/23 0405 02/15/23 0500 02/15/23 0600 02/15/23 0755   BP: (!) 182/94 (!) 192/87 (!) 158/72    Pulse: 91 92 83 98   Resp: (!) 27 (!) 30 16 18   Temp:  97.8 °F (36.6 °C)     TempSrc:  Oral     SpO2: 97% 98% 100% (!) 93%   Weight:   95.1 kg (209 lb 10.5 oz)    Height:           Physical Exam:   Scattered rhonchi  Heart soft basal systolic murmur  Extremities warm    Laboratory:  CBC:   Recent Labs   Lab 02/13/23  0727   WBC 9.67   RBC 2.61*   HGB 8.5*   HCT 26.5*      *   MCH 32.6*   MCHC 32.1     BMP:   Recent Labs   Lab 02/14/23  0531 02/15/23  0612   * 100   * 134*   K 4.0 3.5   CL 95 95   CO2 25 32*   BUN 11 12   CREATININE 0.9 0.8   CALCIUM 7.1* 7.1*   MG 1.3*  --      CMP:   Recent Labs   Lab 02/13/23  0113 02/13/23  0727 02/15/23  0612   GLU 66*   < > 100   CALCIUM 7.1*   < > 7.1*   ALBUMIN 2.5*  --   --    PROT 6.2  --   --    *   < > 134*   K 5.4*   < > 3.5   CO2 16*   < > 32*   CL 97   < > 95   BUN 8   < > 12   CREATININE 0.8   < > 0.8   ALKPHOS 101  --   --    ALT 12  --   --    AST 38  --   --    BILITOT 0.5  --   --     < > = values in this interval not displayed.     LFTs:   Recent Labs   Lab 02/13/23  0113   ALT 12   AST 38   ALKPHOS 101   BILITOT 0.5   PROT 6.2   ALBUMIN 2.5*     Coagulation: No results for input(s): PT, INR, APTT in the last 168 hours.  Cardiac markers:   Recent Labs   Lab 02/13/23  0159   TROPONINI <0.006       Imaging:  X-Ray Chest AP Portable   Final Result      As above.         Electronically signed by: Chandu Louise MD   Date:    02/13/2023   Time:    01:44            Problems:   Acute on chronic hypoxemic respiratory failure  Acute on chronic COPD  Recurrent adenocarcinoma lungs  Current smoker  Mild chronic diastolic CHF  Mild " aortic stenosis  Anemia of chronic diseases  Bilateral small pleural effusions.           Plan of Care: Add oral diltiazem to better control pulse and BP        Willard Roberts

## 2023-02-15 NOTE — ASSESSMENT & PLAN NOTE
"Patient with COPD on Home O2 at 3L NC, Recurrent Lung Cancer, and HFpEF.  Recently admitted for COPD exacerbation.  Patient presented with shortness of breath that started over the last few days PTA, as well as intermittent diarrhea.  On arrival, oxygen saturations were 50% and placed on BiPAP.  CXR on admission with bibasilar opacities and mild increased interstitial attenuation suggestive for mild pulmonary edema, more pronounced within the lower lung zones.   Labs on admission  and Procal normal .  Patient started on IV Lasix with improvement and now off BiPAP.  Continues to improve, but with a little more wheezing this morning.     Patient seen by Pulmonary - "Improving edema, respiratory support, and symptoms. Patient appears stable for transfer to the floor with no further NIV required as she is back to her baseline home O2 requirements and not in distress. Would continue diuresis as much as possible prior to discharge. Critical care to sign off. Please call for any further concerns".     Plan:  Continue with IV Lasix 60mg bid  Continue with O2 at tolerated to keep SpO2 >92%   Continue with Duonebs Q4 - change to standing for the next 24 hours  Strict intake and output  Transfer to floor   "

## 2023-02-15 NOTE — PROGRESS NOTES
[facility] Intensive Care [facility] Hospital Medicine  Progress Note    Patient Name: Nalini Varma  MRN: [id number]  Patient Class: IP- Inpatient   Admission Date: 2/13/2023  Length of Stay: 2 days  Attending Physician: Paras Antonio MD  Primary Care Provider: Yane Sahni MD        Subjective:     Principal Problem:Acute on chronic diastolic heart failure      HPI:  The patient is a 72 y.o. female with a past medical history of COPD (on home O2 3L) with 53 pack year smoking hx, current smoker, lung CA, chronic anemia, anxiety, CKD Stage 3, systolic and diastolic heart failure who presents with shortness of breath that started over the last few days.  Also has some diarrhea.  On arrival, oxygen saturations were 50%.  Initially started on nasal cannula with no improvement and was placed on BiPAP.  She also received 1 breathing treatment as well as 0.4 mg of nitroglycerin spray.  On initial workup, the patient's BNP is elevated to 378, some peripheral edema.  She will be admitted for further management of her acute on chronic respiratory failure with hypoxia and acute on chronic systolic heart failure.      Overview/Hospital Course:  No notes on file    Interval History: Patient is awake and alert this morning.  Feeling better since admission, but more wheezing this morning.  No chest pain.  No diarrhea since admission.    Review of Systems   Constitutional:  Positive for fatigue. Negative for fever.   HENT:  Negative for congestion, ear pain, rhinorrhea and sinus pressure.    Eyes:  Negative for pain and discharge.   Respiratory:  Positive for shortness of breath (slightly improved) and wheezing. Negative for cough and chest tightness.    Cardiovascular:  Positive for leg swelling (improving). Negative for chest pain and palpitations.   Gastrointestinal:  Negative for abdominal distention, abdominal pain, diarrhea, nausea and vomiting.   Endocrine: Negative for cold intolerance and heat intolerance.    Genitourinary:  Negative for difficulty urinating, flank pain, frequency, hematuria and urgency.   Musculoskeletal:  Negative for arthralgias, joint swelling and myalgias.   Skin:  Negative for rash.   Allergic/Immunologic: Negative for environmental allergies and food allergies.   Neurological:  Negative for dizziness, weakness, light-headedness and headaches.   Hematological:  Does not bruise/bleed easily.   Psychiatric/Behavioral:  Negative for agitation, behavioral problems and decreased concentration. The patient is nervous/anxious.    Objective:     Vital Signs (Most Recent):  Temp: 99.2 °F (37.3 °C) (02/15/23 0705)  Pulse: 73 (02/15/23 1602)  Resp: 14 (02/15/23 1602)  BP: 110/62 (02/15/23 1602)  SpO2: 100 % (02/15/23 1602)   Vital Signs (24h Range):  Temp:  [97.7 °F (36.5 °C)-99.2 °F (37.3 °C)] 99.2 °F (37.3 °C)  Pulse:  [] 73  Resp:  [12-45] 14  SpO2:  [82 %-100 %] 100 %  BP: ()/() 110/62     Weight: 95.1 kg (209 lb 10.5 oz)  Body mass index is 33.84 kg/m².    Intake/Output Summary (Last 24 hours) at 2/15/2023 1627  Last data filed at 2/15/2023 1541  Gross per 24 hour   Intake 1309.67 ml   Output 3500 ml   Net -2190.33 ml        Physical Exam  Constitutional:       Appearance: She is well-developed. She is ill-appearing.   HENT:      Head: Normocephalic and atraumatic.      Right Ear: External ear normal.      Left Ear: External ear normal.      Nose: Nose normal.      Mouth/Throat:      Mouth: Mucous membranes are moist.      Pharynx: Oropharynx is clear.   Eyes:      General: No scleral icterus.        Right eye: No discharge.         Left eye: No discharge.      Conjunctiva/sclera: Conjunctivae normal.   Cardiovascular:      Rate and Rhythm: Normal rate and regular rhythm.      Pulses:           Radial pulses are 2+ on the right side and 2+ on the left side.      Heart sounds: Normal heart sounds.   Pulmonary:      Effort: Pulmonary effort is normal. No tachypnea or respiratory  "distress.      Breath sounds: Wheezing present. No rales.   Abdominal:      General: Bowel sounds are decreased. There is no distension.      Palpations: Abdomen is soft.      Tenderness: There is no abdominal tenderness.   Musculoskeletal:         General: Normal range of motion.      Cervical back: Normal range of motion and neck supple.      Right lower leg: Edema present.      Left lower leg: Edema present.      Comments: 1+   Skin:     General: Skin is warm and dry.      Coloration: Skin is pale.   Neurological:      Mental Status: She is alert and oriented to person, place, and time.      GCS: GCS eye subscore is 4. GCS verbal subscore is 5. GCS motor subscore is 6.      Motor: Motor function is intact.   Psychiatric:         Mood and Affect: Mood normal.         Speech: Speech normal.         Behavior: Behavior normal.       Significant Labs: All pertinent labs within the past 24 hours have been reviewed.    Significant Imaging: I have reviewed all pertinent imaging results/findings within the past 24 hours.      Assessment/Plan:      * Acute on chronic diastolic heart failure  TTE on admission with EF of 60% and Grade III DD  -See above      Acute on chronic respiratory failure with hypoxia and hypercapnia  Patient with COPD on Home O2 at 3L [place], Recurrent Lung Cancer, and HFpEF.  Recently admitted for COPD exacerbation.  Patient presented with shortness of breath that started over the last few days PTA, as well as intermittent diarrhea.  On arrival, oxygen saturations were 50% and placed on BiPAP.  CXR on admission with bibasilar opacities and mild increased interstitial attenuation suggestive for mild pulmonary edema, more pronounced within the lower lung zones.   Labs on admission  and Procal normal .  Patient started on IV Lasix with improvement and now off BiPAP.  Continues to improve, but with a little more wheezing this morning.     Patient seen by Pulmonary - "Improving edema, respiratory " "support, and symptoms. Patient appears stable for transfer to the floor with no further NIV required as she is back to her baseline home O2 requirements and not in distress. Would continue diuresis as much as possible prior to discharge. Critical care to sign off. Please call for any further concerns".     Plan:  Continue with IV Lasix 60mg bid  Continue with O2 at tolerated to keep SpO2 >92%   Continue with Duonebs Q4 - change to standing for the next 24 hours  Strict intake and output  Transfer to floor     Chronic obstructive pulmonary disease  COPD with PFTs confirming diagnosis in 2020 on Home O2 3L. Did not appear to be in COPD exacerbation on admission, but more expiratory wheezing today.  -Start Standing DuoNebs q4 - consider steroids if not improving  -Continue triple therapy with LABA/LAMA/ICS  -Continue O2 support for goal 02 88-92%  -Discussed importance of smoking cessation      Diarrhea  Patient recently completed a course of po Vanc for C Diff.  Had recurrent diarrhea at home, but none since admission.           Advance care planning  Followed by Palliative Care.  Patient is Full Code at this time      Recurrent adenocarcinoma of left lung  Per Pulmonary - "Recurrent stage 1 disease with unknown staging as there was not follow up biopsy performed. She was undergoing chemo/rads for this in December but she stopped chemotherapy and radiation in January per patients request. Plans for repeat surveillance CT scan in 1 month"    CKD (chronic kidney disease) stage 3, GFR 30-59 ml/min  Creatinine .8, at baseline    Monitor for acute decompensation      Severe obesity  Body mass index is 33.84 kg/m². Morbid obesity complicates all aspects of disease management from diagnostic modalities to treatment. Weight loss encouraged and health benefits explained to patient.         Hyperkalemia  K- 5.4    Recheck BMP with AM labs  Lasix    Generalized anxiety disorder  Hx of anxiety, MDD - Chronic. " Controlled.  -Continue home meds of Duloxetine 60mg daily    Hyperlipidemia  -Continue Lipitor      Essential hypertension  Normotensive currently  -Continue with Home Metoprolol and Losartan        VTE Risk Mitigation (From admission, onward)         Ordered     heparin (porcine) injection 5,000 Units  Every 8 hours         02/13/23 0447     IP VTE HIGH RISK PATIENT  Once         02/13/23 0447     Place sequential compression device  Until discontinued         02/13/23 0447                Discharge Planning   FELICIANO:      Code Status: Full Code   Is the patient medically ready for discharge?:     Reason for patient still in hospital (select all that apply): Patient trending condition, Treatment and Consult recommendations  Discharge Plan A: Home with family                  Paras Antonio MD  Department of Hospital Medicine   [facility] - Intensive Care ([place])

## 2023-02-15 NOTE — ASSESSMENT & PLAN NOTE
Patient recently completed a course of po Vanc for C Diff.  Had recurrent diarrhea at home, but none since admission.

## 2023-02-15 NOTE — PLAN OF CARE
Problem: Adult Inpatient Plan of Care  Goal: Plan of Care Review  Outcome: Ongoing, Progressing  Flowsheets (Taken 2/15/2023 0552)  Plan of Care Reviewed With: patient  Goal: Absence of Hospital-Acquired Illness or Injury  Outcome: Ongoing, Progressing  Intervention: Identify and Manage Fall Risk  Flowsheets (Taken 2/15/2023 0552)  Safety Promotion/Fall Prevention:   assistive device/personal item within reach   bed alarm set   medications reviewed   pulse ox   side rails raised x 3   instructed to call staff for mobility  Intervention: Prevent Skin Injury  Flowsheets (Taken 2/15/2023 0552)  Body Position: weight shifting  Intervention: Prevent and Manage VTE (Venous Thromboembolism) Risk  Flowsheets (Taken 2/15/2023 0552)  VTE Prevention/Management:   bleeding risk assessed   dorsiflexion/plantar flexion performed  Range of Motion: active ROM (range of motion) encouraged     Problem: Fluid and Electrolyte Imbalance (Acute Kidney Injury/Impairment)  Goal: Fluid and Electrolyte Balance  Outcome: Ongoing, Progressing  Intervention: Monitor and Manage Fluid and Electrolyte Balance  Flowsheets (Taken 2/15/2023 0552)  Fluid/Electrolyte Management: fluids restricted     Problem: Renal Function Impairment (Acute Kidney Injury/Impairment)  Goal: Effective Renal Function  Outcome: Ongoing, Progressing  Intervention: Monitor and Support Renal Function  Flowsheets (Taken 2/15/2023 0552)  Medication Review/Management: medications reviewed

## 2023-02-15 NOTE — ASSESSMENT & PLAN NOTE
Body mass index is 33.84 kg/m². Morbid obesity complicates all aspects of disease management from diagnostic modalities to treatment. Weight loss encouraged and health benefits explained to patient.

## 2023-02-16 LAB
ANION GAP SERPL CALC-SCNC: 8 MMOL/L (ref 8–16)
BUN SERPL-MCNC: 13 MG/DL (ref 8–23)
CALCIUM SERPL-MCNC: 7.4 MG/DL (ref 8.7–10.5)
CHLORIDE SERPL-SCNC: 95 MMOL/L (ref 95–110)
CO2 SERPL-SCNC: 30 MMOL/L (ref 23–29)
CREAT SERPL-MCNC: 0.8 MG/DL (ref 0.5–1.4)
EST. GFR  (NO RACE VARIABLE): >60 ML/MIN/1.73 M^2
GLUCOSE SERPL-MCNC: 100 MG/DL (ref 70–110)
POTASSIUM SERPL-SCNC: 3.7 MMOL/L (ref 3.5–5.1)
SODIUM SERPL-SCNC: 133 MMOL/L (ref 136–145)

## 2023-02-16 PROCEDURE — 94761 N-INVAS EAR/PLS OXIMETRY MLT: CPT

## 2023-02-16 PROCEDURE — 25000003 PHARM REV CODE 250: Performed by: NURSE PRACTITIONER

## 2023-02-16 PROCEDURE — 25000242 PHARM REV CODE 250 ALT 637 W/ HCPCS: Performed by: NURSE PRACTITIONER

## 2023-02-16 PROCEDURE — 99900035 HC TECH TIME PER 15 MIN (STAT)

## 2023-02-16 PROCEDURE — 94660 CPAP INITIATION&MGMT: CPT

## 2023-02-16 PROCEDURE — 99233 PR SUBSEQUENT HOSPITAL CARE,LEVL III: ICD-10-PCS | Mod: ,,, | Performed by: INTERNAL MEDICINE

## 2023-02-16 PROCEDURE — 80048 BASIC METABOLIC PNL TOTAL CA: CPT | Performed by: NURSE PRACTITIONER

## 2023-02-16 PROCEDURE — 27000221 HC OXYGEN, UP TO 24 HOURS

## 2023-02-16 PROCEDURE — 25000242 PHARM REV CODE 250 ALT 637 W/ HCPCS: Performed by: INTERNAL MEDICINE

## 2023-02-16 PROCEDURE — 94640 AIRWAY INHALATION TREATMENT: CPT

## 2023-02-16 PROCEDURE — 25000003 PHARM REV CODE 250

## 2023-02-16 PROCEDURE — 20000000 HC ICU ROOM

## 2023-02-16 PROCEDURE — 99233 SBSQ HOSP IP/OBS HIGH 50: CPT | Mod: ,,, | Performed by: INTERNAL MEDICINE

## 2023-02-16 PROCEDURE — 25000003 PHARM REV CODE 250: Performed by: INTERNAL MEDICINE

## 2023-02-16 PROCEDURE — 36415 COLL VENOUS BLD VENIPUNCTURE: CPT | Performed by: NURSE PRACTITIONER

## 2023-02-16 PROCEDURE — 63600175 PHARM REV CODE 636 W HCPCS: Performed by: NURSE PRACTITIONER

## 2023-02-16 PROCEDURE — 97530 THERAPEUTIC ACTIVITIES: CPT

## 2023-02-16 PROCEDURE — 97162 PT EVAL MOD COMPLEX 30 MIN: CPT

## 2023-02-16 PROCEDURE — 63600175 PHARM REV CODE 636 W HCPCS: Performed by: HOSPITALIST

## 2023-02-16 PROCEDURE — 63600175 PHARM REV CODE 636 W HCPCS: Performed by: SURGERY

## 2023-02-16 RX ADMIN — HYDROMORPHONE HYDROCHLORIDE 0.5 MG: 1 INJECTION, SOLUTION INTRAMUSCULAR; INTRAVENOUS; SUBCUTANEOUS at 11:02

## 2023-02-16 RX ADMIN — HEPARIN SODIUM 5000 UNITS: 5000 INJECTION INTRAVENOUS; SUBCUTANEOUS at 09:02

## 2023-02-16 RX ADMIN — Medication 400 MG: at 09:02

## 2023-02-16 RX ADMIN — ATORVASTATIN CALCIUM 40 MG: 20 TABLET, FILM COATED ORAL at 09:02

## 2023-02-16 RX ADMIN — HEPARIN SODIUM 5000 UNITS: 5000 INJECTION INTRAVENOUS; SUBCUTANEOUS at 05:02

## 2023-02-16 RX ADMIN — Medication 1000 UNITS: at 09:02

## 2023-02-16 RX ADMIN — HEPARIN SODIUM 5000 UNITS: 5000 INJECTION INTRAVENOUS; SUBCUTANEOUS at 02:02

## 2023-02-16 RX ADMIN — PANTOPRAZOLE SODIUM 40 MG: 40 TABLET, DELAYED RELEASE ORAL at 09:02

## 2023-02-16 RX ADMIN — IPRATROPIUM BROMIDE AND ALBUTEROL SULFATE 3 ML: 2.5; .5 SOLUTION RESPIRATORY (INHALATION) at 07:02

## 2023-02-16 RX ADMIN — IPRATROPIUM BROMIDE AND ALBUTEROL SULFATE 3 ML: 2.5; .5 SOLUTION RESPIRATORY (INHALATION) at 04:02

## 2023-02-16 RX ADMIN — MUPIROCIN: 20 OINTMENT TOPICAL at 09:02

## 2023-02-16 RX ADMIN — FLUTICASONE FUROATE AND VILANTEROL TRIFENATATE 1 PUFF: 100; 25 POWDER RESPIRATORY (INHALATION) at 07:02

## 2023-02-16 RX ADMIN — GABAPENTIN 300 MG: 300 CAPSULE ORAL at 02:02

## 2023-02-16 RX ADMIN — THERA TABS 1 TABLET: TAB at 09:02

## 2023-02-16 RX ADMIN — METOPROLOL SUCCINATE 50 MG: 50 TABLET, EXTENDED RELEASE ORAL at 09:02

## 2023-02-16 RX ADMIN — FUROSEMIDE 60 MG: 10 INJECTION, SOLUTION INTRAMUSCULAR; INTRAVENOUS at 09:02

## 2023-02-16 RX ADMIN — TRAZODONE HYDROCHLORIDE 100 MG: 50 TABLET ORAL at 08:02

## 2023-02-16 RX ADMIN — HYDROMORPHONE HYDROCHLORIDE 0.5 MG: 1 INJECTION, SOLUTION INTRAMUSCULAR; INTRAVENOUS; SUBCUTANEOUS at 05:02

## 2023-02-16 RX ADMIN — Medication 250 MG: at 09:02

## 2023-02-16 RX ADMIN — COLLAGENASE SANTYL: 250 OINTMENT TOPICAL at 09:02

## 2023-02-16 RX ADMIN — GABAPENTIN 300 MG: 300 CAPSULE ORAL at 08:02

## 2023-02-16 RX ADMIN — DULOXETINE 60 MG: 30 CAPSULE, DELAYED RELEASE ORAL at 09:02

## 2023-02-16 RX ADMIN — DILTIAZEM HYDROCHLORIDE 180 MG: 180 CAPSULE, COATED, EXTENDED RELEASE ORAL at 09:02

## 2023-02-16 RX ADMIN — IPRATROPIUM BROMIDE AND ALBUTEROL SULFATE 3 ML: 2.5; .5 SOLUTION RESPIRATORY (INHALATION) at 12:02

## 2023-02-16 RX ADMIN — LOSARTAN POTASSIUM 100 MG: 50 TABLET, FILM COATED ORAL at 09:02

## 2023-02-16 RX ADMIN — GABAPENTIN 300 MG: 300 CAPSULE ORAL at 09:02

## 2023-02-16 RX ADMIN — HYDROMORPHONE HYDROCHLORIDE 0.5 MG: 1 INJECTION, SOLUTION INTRAMUSCULAR; INTRAVENOUS; SUBCUTANEOUS at 09:02

## 2023-02-16 NOTE — ASSESSMENT & PLAN NOTE
"Patient with COPD on Home O2 at 3L NC, Recurrent Lung Cancer, and HFpEF.  Recently admitted for COPD exacerbation.  Patient presented with shortness of breath that started over the last few days PTA, as well as intermittent diarrhea.  On arrival, oxygen saturations were 50% and placed on BiPAP.  CXR on admission with bibasilar opacities and mild increased interstitial attenuation suggestive for mild pulmonary edema, more pronounced within the lower lung zones.   Labs on admission  and Procal normal .  Patient started on IV Lasix with improvement and now off BiPAP.  Continues to improve and close to baseline.  Wheezing from yesterday much improved as well.     Patient seen by Pulmonary - "Improving edema, respiratory support, and symptoms. Patient appears stable for transfer to the floor with no further NIV required as she is back to her baseline home O2 requirements and not in distress. Would continue diuresis as much as possible prior to discharge. Critical care to sign off. Please call for any further concerns".     Plan:  Continue with IV Lasix 60mg bid  Continue with O2 at tolerated to keep SpO2 >92%   Continue with Duonebs Q4  Strict intake and output  Transfer to floor   "

## 2023-02-16 NOTE — PROGRESS NOTES
[facility] Intensive Care [facility] Medicine  Progress Note    Patient Name: Nalini Varma  MRN: [id number]  Patient Class: IP- Inpatient   Admission Date: 2/13/2023  Length of Stay: 3 days  Attending Physician: Paras Antonio MD  Primary Care Provider: Yane Sahni MD        Subjective:     Principal Problem:Acute on chronic diastolic heart failure        HPI:  The patient is a 72 y.o. female with a past medical history of COPD (on home O2 3L) with 53 pack year smoking hx, current smoker, lung CA, chronic anemia, anxiety, CKD Stage 3, systolic and diastolic heart failure who presents with shortness of breath that started over the last few days.  Also has some diarrhea.  On arrival, oxygen saturations were 50%.  Initially started on nasal cannula with no improvement and was placed on BiPAP.  She also received 1 breathing treatment as well as 0.4 mg of nitroglycerin spray.  On initial workup, the patient's BNP is elevated to 378, some peripheral edema.  She will be admitted for further management of her acute on chronic respiratory failure with hypoxia and acute on chronic systolic heart failure.      Overview/Hospital Course:  No notes on file    Interval History: Patient is awake and alert this morning.  Feeling better since admission and wheezing improved from yesterday.  No chest pain.  No diarrhea since admission.    Review of Systems   Constitutional:  Positive for fatigue. Negative for fever.   HENT:  Negative for congestion, ear pain, rhinorrhea and sinus pressure.    Eyes:  Negative for pain and discharge.   Respiratory:  Positive for shortness of breath (improved and close to baseline) and wheezing (improved). Negative for cough and chest tightness.    Cardiovascular:  Positive for leg swelling (improved). Negative for chest pain and palpitations.   Gastrointestinal:  Negative for abdominal distention, abdominal pain, diarrhea, nausea and vomiting.   Endocrine: Negative for cold  intolerance and heat intolerance.   Genitourinary:  Negative for difficulty urinating, flank pain, frequency, hematuria and urgency.   Musculoskeletal:  Negative for arthralgias, joint swelling and myalgias.   Skin:  Negative for rash.   Allergic/Immunologic: Negative for environmental allergies and food allergies.   Neurological:  Negative for dizziness, weakness, light-headedness and headaches.   Hematological:  Does not bruise/bleed easily.   Psychiatric/Behavioral:  Negative for agitation, behavioral problems and decreased concentration. The patient is nervous/anxious.    Objective:     Vital Signs (Most Recent):  Temp: 98.2 °F (36.8 °C) (02/16/23 1200)  Pulse: 92 (02/16/23 1218)  Resp: (!) 24 (02/16/23 1218)  BP: (!) 115/55 (02/16/23 1200)  SpO2: 98 % (02/16/23 1218)   Vital Signs (24h Range):  Temp:  [97.6 °F (36.4 °C)-98.2 °F (36.8 °C)] 98.2 °F (36.8 °C)  Pulse:  [] 92  Resp:  [14-32] 24  SpO2:  [88 %-100 %] 98 %  BP: ()/(53-72) 115/55     Weight: 95.1 kg (209 lb 10.5 oz)  Body mass index is 33.84 kg/m².    Intake/Output Summary (Last 24 hours) at 2/16/2023 1324  Last data filed at 2/16/2023 0600  Gross per 24 hour   Intake 120 ml   Output 2600 ml   Net -2480 ml        Physical Exam  Constitutional:       General: She is not in acute distress.     Appearance: She is well-developed. She is obese. She is ill-appearing (chronically). She is not toxic-appearing.   HENT:      Head: Normocephalic and atraumatic.      Right Ear: External ear normal.      Left Ear: External ear normal.      Nose: Nose normal.      Mouth/Throat:      Mouth: Mucous membranes are moist.      Pharynx: Oropharynx is clear.   Eyes:      General: No scleral icterus.        Right eye: No discharge.         Left eye: No discharge.      Conjunctiva/sclera: Conjunctivae normal.   Cardiovascular:      Rate and Rhythm: Normal rate and regular rhythm.      Pulses:           Radial pulses are 2+ on the right side and 2+ on the left  side.      Heart sounds: Normal heart sounds.   Pulmonary:      Effort: Pulmonary effort is normal. No tachypnea or respiratory distress.      Breath sounds: Wheezing (improved) present. No rales.   Abdominal:      General: Bowel sounds are decreased. There is no distension.      Palpations: Abdomen is soft.      Tenderness: There is no abdominal tenderness.   Musculoskeletal:         General: Normal range of motion.      Cervical back: Normal range of motion and neck supple.      Right lower leg: Edema present.      Left lower leg: Edema present.      Comments: Trace-1+   Skin:     General: Skin is warm and dry.      Coloration: Skin is pale.   Neurological:      Mental Status: She is alert and oriented to person, place, and time. Mental status is at baseline.      GCS: GCS eye subscore is 4. GCS verbal subscore is 5. GCS motor subscore is 6.      Motor: Motor function is intact.   Psychiatric:         Mood and Affect: Mood normal.         Speech: Speech normal.         Behavior: Behavior normal.       Significant Labs: All pertinent labs within the past 24 hours have been reviewed.    Significant Imaging: I have reviewed all pertinent imaging results/findings within the past 24 hours.      Assessment/Plan:      * Acute on chronic diastolic heart failure  TTE on admission with EF of 60% and Grade III DD  -See above      Acute on chronic respiratory failure with hypoxia and hypercapnia  Patient with COPD on Home O2 at 3L [place], Recurrent Lung Cancer, and HFpEF.  Recently admitted for COPD exacerbation.  Patient presented with shortness of breath that started over the last few days PTA, as well as intermittent diarrhea.  On arrival, oxygen saturations were 50% and placed on BiPAP.  CXR on admission with bibasilar opacities and mild increased interstitial attenuation suggestive for mild pulmonary edema, more pronounced within the lower lung zones.   Labs on admission  and Procal normal .  Patient started on IV  "Lasix with improvement and now off BiPAP.  Continues to improve and close to baseline.  Wheezing from yesterday much improved as well.     Patient seen by Pulmonary - "Improving edema, respiratory support, and symptoms. Patient appears stable for transfer to the floor with no further NIV required as she is back to her baseline home O2 requirements and not in distress. Would continue diuresis as much as possible prior to discharge. Critical care to sign off. Please call for any further concerns".     Plan:  Continue with IV Lasix 60mg bid  Continue with O2 at tolerated to keep SpO2 >92%   Continue with Duonebs Q4  Strict intake and output  Transfer to floor     Chronic obstructive pulmonary disease  COPD with PFTs confirming diagnosis in 2020 on Home O2 3L. Did not appear to be in COPD exacerbation on admission, but more expiratory wheezing yesterday.  DuoNebs changed to standing and lung exam improved today  -Continue Standing DuoNebs q4  -Continue triple therapy with LABA/LAMA/ICS  -Continue O2 support for goal 02 88-92%  -Discussed importance of smoking cessation      Diarrhea  Patient recently completed a course of po Vanc for C Diff.  Had recurrent diarrhea at home, but none since admission.   -Monitor          Advance care planning  Followed by Palliative Care.  Patient is Full Code at this time      Recurrent adenocarcinoma of left lung  Per Pulmonary - "Recurrent stage 1 disease with unknown staging as there was not follow up biopsy performed. She was undergoing chemo/rads for this in December but she stopped chemotherapy and radiation in January per patients request. Plans for repeat surveillance CT scan in 1 month"    CKD (chronic kidney disease) stage 3, GFR 30-59 ml/min  Creatinine .8, at baseline    Monitor for acute decompensation      Severe obesity  Body mass index is 33.84 kg/m². Morbid obesity complicates all aspects of disease management from diagnostic modalities to treatment. Weight loss " encouraged and health benefits explained to patient.         Hyperkalemia  K- 5.4    Recheck BMP with AM labs  Lasix    Generalized anxiety disorder  Hx of anxiety, MDD - Chronic. Controlled.  -Continue home meds of Duloxetine 60mg daily    Hyperlipidemia  -Continue Lipitor      Essential hypertension  Normotensive currently  -Continue with Home Metoprolol and Losartan        VTE Risk Mitigation (From admission, onward)         Ordered     heparin (porcine) injection 5,000 Units  Every 8 hours         02/13/23 0447     IP VTE HIGH RISK PATIENT  Once         02/13/23 0447     Place sequential compression device  Until discontinued         02/13/23 0447                Discharge Planning   FELICIANO:      Code Status: Full Code   Is the patient medically ready for discharge?:     Reason for patient still in hospital (select all that apply): Patient trending condition, Treatment and Consult recommendations  Discharge Plan A: Home with family                  Paras Antonio MD  Department of Hospital Medicine   [facility] - Intensive Care ([place])

## 2023-02-16 NOTE — ASSESSMENT & PLAN NOTE
COPD with PFTs confirming diagnosis in 2020 on Home O2 3L. Did not appear to be in COPD exacerbation on admission, but more expiratory wheezing yesterday.  DuoNebs changed to standing and lung exam improved today  -Continue Standing DuoNebs q4  -Continue triple therapy with LABA/LAMA/ICS  -Continue O2 support for goal 02 88-92%  -Discussed importance of smoking cessation

## 2023-02-16 NOTE — PLAN OF CARE
Problem: Adult Inpatient Plan of Care  Goal: Plan of Care Review  Outcome: Ongoing, Progressing  Goal: Patient-Specific Goal (Individualized)  Outcome: Ongoing, Progressing  Goal: Absence of Hospital-Acquired Illness or Injury  Outcome: Ongoing, Progressing  Goal: Optimal Comfort and Wellbeing  Outcome: Ongoing, Progressing  Goal: Readiness for Transition of Care  Outcome: Ongoing, Progressing     Problem: Fluid and Electrolyte Imbalance (Acute Kidney Injury/Impairment)  Goal: Fluid and Electrolyte Balance  Outcome: Ongoing, Progressing     Problem: Oral Intake Inadequate (Acute Kidney Injury/Impairment)  Goal: Optimal Nutrition Intake  Outcome: Ongoing, Progressing     Problem: Renal Function Impairment (Acute Kidney Injury/Impairment)  Goal: Effective Renal Function  Outcome: Ongoing, Progressing     Problem: Impaired Wound Healing  Goal: Optimal Wound Healing  Outcome: Ongoing, Progressing     Problem: Skin Injury Risk Increased  Goal: Skin Health and Integrity  Outcome: Ongoing, Progressing     Problem: Coping Ineffective  Goal: Effective Coping  Outcome: Ongoing, Progressing     Problem: Infection  Goal: Absence of Infection Signs and Symptoms  Outcome: Ongoing, Progressing

## 2023-02-16 NOTE — PLAN OF CARE
Problem: Physical Therapy  Goal: Physical Therapy Goal  Description: Goals to be met by: 3/18/23    Patient will perform the following to increase strength, improve mobility, and return to prior level of function:    1. Supine <> sit with SPV.  2. Stand pivot transfer bed <> WC with SBA.   3. Sit EOB for ~15 minutes with SBA without shortness of breath.       2/16/2023 1333 by Mireille Andre, PT  Outcome: Ongoing, Progressing     PT orders received. PT evaluation completed and goals/POC established. Pt tolerated evaluation well with no adverse reactions. Pt sat EOB for ~15 minutes with SBA - CGA and upper extremity support; pt SOB intermittently throughout session. PT will continue to follow and progress goals as tolerated. Recommend discharge to home with HHPT.

## 2023-02-16 NOTE — SUBJECTIVE & OBJECTIVE
Interval History: Patient is awake and alert this morning.  Feeling better since admission and wheezing improved from yesterday.  No chest pain.  No diarrhea since admission.    Review of Systems   Constitutional:  Positive for fatigue. Negative for fever.   HENT:  Negative for congestion, ear pain, rhinorrhea and sinus pressure.    Eyes:  Negative for pain and discharge.   Respiratory:  Positive for shortness of breath (improved and close to baseline) and wheezing (improved). Negative for cough and chest tightness.    Cardiovascular:  Positive for leg swelling (improved). Negative for chest pain and palpitations.   Gastrointestinal:  Negative for abdominal distention, abdominal pain, diarrhea, nausea and vomiting.   Endocrine: Negative for cold intolerance and heat intolerance.   Genitourinary:  Negative for difficulty urinating, flank pain, frequency, hematuria and urgency.   Musculoskeletal:  Negative for arthralgias, joint swelling and myalgias.   Skin:  Negative for rash.   Allergic/Immunologic: Negative for environmental allergies and food allergies.   Neurological:  Negative for dizziness, weakness, light-headedness and headaches.   Hematological:  Does not bruise/bleed easily.   Psychiatric/Behavioral:  Negative for agitation, behavioral problems and decreased concentration. The patient is nervous/anxious.    Objective:     Vital Signs (Most Recent):  Temp: 98.2 °F (36.8 °C) (02/16/23 1200)  Pulse: 92 (02/16/23 1218)  Resp: (!) 24 (02/16/23 1218)  BP: (!) 115/55 (02/16/23 1200)  SpO2: 98 % (02/16/23 1218)   Vital Signs (24h Range):  Temp:  [97.6 °F (36.4 °C)-98.2 °F (36.8 °C)] 98.2 °F (36.8 °C)  Pulse:  [] 92  Resp:  [14-32] 24  SpO2:  [88 %-100 %] 98 %  BP: ()/(53-72) 115/55     Weight: 95.1 kg (209 lb 10.5 oz)  Body mass index is 33.84 kg/m².    Intake/Output Summary (Last 24 hours) at 2/16/2023 1324  Last data filed at 2/16/2023 0600  Gross per 24 hour   Intake 120 ml   Output 2600 ml   Net  -2480 ml        Physical Exam  Constitutional:       General: She is not in acute distress.     Appearance: She is well-developed. She is obese. She is ill-appearing (chronically). She is not toxic-appearing.   HENT:      Head: Normocephalic and atraumatic.      Right Ear: External ear normal.      Left Ear: External ear normal.      Nose: Nose normal.      Mouth/Throat:      Mouth: Mucous membranes are moist.      Pharynx: Oropharynx is clear.   Eyes:      General: No scleral icterus.        Right eye: No discharge.         Left eye: No discharge.      Conjunctiva/sclera: Conjunctivae normal.   Cardiovascular:      Rate and Rhythm: Normal rate and regular rhythm.      Pulses:           Radial pulses are 2+ on the right side and 2+ on the left side.      Heart sounds: Normal heart sounds.   Pulmonary:      Effort: Pulmonary effort is normal. No tachypnea or respiratory distress.      Breath sounds: Wheezing (improved) present. No rales.   Abdominal:      General: Bowel sounds are decreased. There is no distension.      Palpations: Abdomen is soft.      Tenderness: There is no abdominal tenderness.   Musculoskeletal:         General: Normal range of motion.      Cervical back: Normal range of motion and neck supple.      Right lower leg: Edema present.      Left lower leg: Edema present.      Comments: Trace-1+   Skin:     General: Skin is warm and dry.      Coloration: Skin is pale.   Neurological:      Mental Status: She is alert and oriented to person, place, and time. Mental status is at baseline.      GCS: GCS eye subscore is 4. GCS verbal subscore is 5. GCS motor subscore is 6.      Motor: Motor function is intact.   Psychiatric:         Mood and Affect: Mood normal.         Speech: Speech normal.         Behavior: Behavior normal.       Significant Labs: All pertinent labs within the past 24 hours have been reviewed.    Significant Imaging: I have reviewed all pertinent imaging results/findings within the past  24 hours.

## 2023-02-16 NOTE — ASSESSMENT & PLAN NOTE
Patient recently completed a course of po Vanc for C Diff.  Had recurrent diarrhea at home, but none since admission.   -Monitor

## 2023-02-16 NOTE — PROGRESS NOTES
Patient on 3 liters nasal cannula, aerosol treatment given and DPI given this morning.  Patient is tolerating well.  BiPap is on standby.

## 2023-02-16 NOTE — PLAN OF CARE
Patient requested bedside commode for home use per MD - order placed - [facility] reviewing - if approved [facility] will deliver to bedside

## 2023-02-16 NOTE — PROGRESS NOTES
Cardiology  Progress Notes            Subjective:  She feels better, she says she was finally able to expectorate.  She now has little wheezing, less cough.  No shortness of breath.    Vital Signs:  Vitals:    02/16/23 0558 02/16/23 0735 02/16/23 0737 02/16/23 0800   BP:       BP Location:       Patient Position:       Pulse:  85 80 78   Resp: 18 16 17 (!) 32   Temp:    97.9 °F (36.6 °C)   TempSrc:    Oral   SpO2:  99% 100% (!) 88%   Weight:       Height:           Physical Exam:  Less scattered wheezing  Heart skips+ soft systolic murmur no gallop  Extremities warm    Laboratory:  CBC:   Recent Labs   Lab 02/13/23  0727   WBC 9.67   RBC 2.61*   HGB 8.5*   HCT 26.5*      *   MCH 32.6*   MCHC 32.1     BMP:   Recent Labs   Lab 02/14/23  0531 02/15/23  0612 02/16/23  0320   *   < > 100   *   < > 133*   K 4.0   < > 3.7   CL 95   < > 95   CO2 25   < > 30*   BUN 11   < > 13   CREATININE 0.9   < > 0.8   CALCIUM 7.1*   < > 7.4*   MG 1.3*  --   --     < > = values in this interval not displayed.     CMP:   Recent Labs   Lab 02/13/23  0113 02/13/23  0727 02/16/23  0320   GLU 66*   < > 100   CALCIUM 7.1*   < > 7.4*   ALBUMIN 2.5*  --   --    PROT 6.2  --   --    *   < > 133*   K 5.4*   < > 3.7   CO2 16*   < > 30*   CL 97   < > 95   BUN 8   < > 13   CREATININE 0.8   < > 0.8   ALKPHOS 101  --   --    ALT 12  --   --    AST 38  --   --    BILITOT 0.5  --   --     < > = values in this interval not displayed.     LFTs:   Recent Labs   Lab 02/13/23  0113   ALT 12   AST 38   ALKPHOS 101   BILITOT 0.5   PROT 6.2   ALBUMIN 2.5*     Coagulation: No results for input(s): PT, INR, APTT in the last 168 hours.  Cardiac markers:   Recent Labs   Lab 02/13/23  0159   TROPONINI <0.006       Imaging:  X-Ray Chest AP Portable   Final Result      As above.         Electronically signed by: Chandu Louise MD   Date:    02/13/2023   Time:    01:44        The rhythm is sinus with frequent PACs.    Problems:   Acute on  chronic hypoxemic respiratory failure  Acute on chronic COPD  Recurrent adenocarcinoma lungs  Current smoker  Mild chronic diastolic CHF  Mild aortic stenosis  Anemia of chronic diseases  Bilateral small pleural effusions.           Plan of Care:  Would continue present pharmacological regimen          Willard Roberts

## 2023-02-16 NOTE — PT/OT/SLP EVAL
Physical Therapy Evaluation    Patient Name:  Nalini Varma   MRN:  [id number]    Recommendations:     Discharge Recommendations: home health PT   Discharge Equipment Recommendations: none   Barriers to discharge: Decreased caregiver support and medical treatment    Assessment:     Nalini Varma is a 72 y.o. female admitted with a medical diagnosis of Acute on chronic diastolic heart failure. She has a past medical history that includes but is not limited to COPD, HTN, HLD, MARY, GIB, L3 compression fracture, pleural effusion and alcohol abuse.  She presents with the following impairments/functional limitations: weakness, impaired endurance, impaired sensation, impaired self care skills, impaired functional mobility, gait instability, impaired balance, decreased lower extremity function, decreased ROM, edema.    PT orders received. PT evaluation completed and goals/POC established. Pt tolerated evaluation well with no adverse reactions. Pt sat EOB for ~15 minutes with SBA - CGA and upper extremity support; pt SOB intermittently throughout session. PT will continue to follow and progress goals as tolerated. Recommend discharge to home with HHPT.      Rehab Prognosis: Good; patient would benefit from acute skilled PT services to address these deficits and reach maximum level of function.    Recent Surgery: * No surgery found *      Plan:     During this hospitalization, patient to be seen 4 x/week to address the identified rehab impairments via therapeutic activities, therapeutic exercises, neuromuscular re-education, wheelchair management/training and progress toward the following goals:    Plan of Care Expires:  03/18/23    Subjective     Chief Complaint: none at this time  Patient/Family Comments/goals: Pt agreeable to PT evaluation  Pain/Comfort:  Pain Rating 1: 0/10    Patients cultural, spiritual, [employer] conflicts given the current situation: no    Living Environment:  Pt lives alone in a ground-level  apartment with no steps to enter. She uses a wheelchair for mobility and is able to navigate and transfer to and from commode without assistance.   Equipment used at home: wheelchair, hospital bed, rollator, oxygen.  DME owned (not currently used): none.  Upon discharge, patient will have some assistance from friend.    Objective:     Communicated with ANDERSON Tucker prior to session.  Patient found HOB elevated with blood pressure cuff, telemetry, pulse ox (continuous), oxygen, PureWick, peripheral IV  upon PT entry to room.    General Precautions: Standard, fall  Orthopedic Precautions:N/A   Braces: N/A  Respiratory Status: Nasal cannula, flow 3 L/min    Exams:  Cognition:   Patient is oriented to self, place, time and situation.  Pt follows approximately 100% of simple commands.    Mood: Pleasant and cooperative.   Safety Awareness: intact  Musculoskeletal:  Posture:  rounded shoulders, forward head  LE ROM/Strength:   R ROM: WFL  L ROM: WFL excluding knee  R Strength:   Hip flexion: 5/5  Knee extension: 5/5  Dorsiflexion: 5/5   L Strength:   Hip flexion: 5/5  Knee extension: 2/5  Dorsiflexion: 5/5   Neuromuscular:  Sensation: Impaired to light touch bilateral LEs.   Tone/Reflexes: No impairments identified with functional mobility. No formal testing performed.  Balance:   Static sitting: SBA - CGA  Dynamic sitting: SBA - CGA  Visual-vestibular: No impairments identified with functional mobility. No formal testing performed.  Integument: Visible skin intact  Cardiopulmonary:  Edema: noted to bilateral lower extremities    Functional Mobility:  Bed Mobility:     Rolling Left:  minimum assistance  Rolling Right: minimum assistance  Scooting: total assistance of 2 persons  Supine to Sit: contact guard assistance  Sit to Supine: contact guard assistance      AM-PAC 6 CLICK MOBILITY  Total Score:11     Treatment & Education:  Bed mobility training as described above.  Pt sat EOB for ~15 minutes with SBA - CGA and upper  extremity support for balance.  Pt required pad and linen change after Purewick leaking, therefore, rolling and bed mobility performed as described above.     PT educated patient re:   PT plan of care/role of PT  Use of bed rails for mobility  Fall and safety precautions  Use of call light (don't get up without assistance)  Pt verbalized understanding     The patient is safe to transfer with RN assist  Patient encouraged to sit up in edge of bed throughout the day to prevent deconditioning.     Patient left HOB elevated with all lines intact, call button in reach, and RN  notified.    GOALS:   Multidisciplinary Problems       Physical Therapy Goals          Problem: Physical Therapy    Goal Priority Disciplines Outcome Goal Variances Interventions   Physical Therapy Goal     PT, PT/OT Ongoing, Progressing     Description: Goals to be met by: 3/18/23    Patient will perform the following to increase strength, improve mobility, and return to prior level of function:    1. Supine <> sit with SPV.  2. Stand pivot transfer bed <> WC with SBA.   3. Sit EOB for ~15 minutes with SBA without shortness of breath.                            History:     Past Medical History:   Diagnosis Date    Anxiety     Cervical spinal stenosis     Choledocholithiasis     Chronic obstructive pulmonary disease 03/16/2016    Degenerative disc disease of cervical spine     Diverticulosis 04/24/2018    History of alcohol abuse 03/02/2021    History of gastric ulcer     History of L3 compression fracture 12/19/2018    History of lung cancer     s/p completion of XRT in 10/2020    Hyperlipidemia     Iron deficiency anemia     Osteoarthritis     Stage III CKD 2017       Past Surgical History:   Procedure Laterality Date    BREAST CYST EXCISION Right     1967    CATARACT EXTRACTION      COLONOSCOPY  2015    COLONOSCOPY N/A 6/8/2022    Procedure: COLONOSCOPY;  Surgeon: Helio Cheema MD;  Location: [facility] ([address] [place] [address]);  Service: Endoscopy;   Laterality: N/A;  5/25-Pt requesting Dr. Cheema-approval given per Dr. Cheema-ml  Fully vaccinated, prep instr portal -ml    CORONARY ANGIOGRAPHY N/A 7/20/2018    Procedure: ANGIOGRAM, CORONARY ARTERY;  Surgeon: Willard Roberts MD;  Location: [facility] CATH LAB;  Service: Cardiovascular;  Laterality: N/A;    ENDOSCOPIC ULTRASOUND OF UPPER GASTROINTESTINAL TRACT N/A 3/24/2021    Procedure: ULTRASOUND, UPPER GI TRACT, ENDOSCOPIC;  Surgeon: Helio Cheema MD;  Location: [facility] ENDO (2ND FLR);  Service: Endoscopy;  Laterality: N/A;    ENDOSCOPIC ULTRASOUND OF UPPER GASTROINTESTINAL TRACT N/A 4/25/2022    Procedure: ULTRASOUND, UPPER GI TRACT, ENDOSCOPIC;  Surgeon: Helio Cheema MD;  Location: [facility] ENDO (2ND FLR);  Service: Endoscopy;  Laterality: N/A;  cardiac risk assessment 1 per Dr. Ortez. see t/e 3/17-SC  3/25:new instructions via portal. home with medical transport?-SC    ERCP N/A 3/24/2021    Procedure: ERCP (ENDOSCOPIC RETROGRADE CHOLANGIOPANCREATOGRAPHY);  Surgeon: Helio Cheema MD;  Location: [facility] (2ND FLR);  Service: Endoscopy;  Laterality: N/A;    ERCP N/A 4/30/2021    Procedure: ERCP (ENDOSCOPIC RETROGRADE CHOLANGIOPANCREATOGRAPHY);  Surgeon: Boo Gray MD;  Location: [facility] (2ND FLR);  Service: Endoscopy;  Laterality: N/A;    ERCP N/A 7/1/2021    Procedure: ERCP (ENDOSCOPIC RETROGRADE CHOLANGIOPANCREATOGRAPHY);  Surgeon: Helio Cheema MD;  Location: [facility] ENDO (2ND FLR);  Service: Endoscopy;  Laterality: N/A;  Ok for Taxi. Dr Cheema  rapid covid 1130am- tb inst email    ESOPHAGOGASTRODUODENOSCOPY  2015    ESOPHAGOGASTRODUODENOSCOPY N/A 3/4/2021    Procedure: EGD (ESOPHAGOGASTRODUODENOSCOPY);  Surgeon: Yenifer Ramirez MD;  Location: [facility];  Service: Endoscopy;  Laterality: N/A;    ESOPHAGOGASTRODUODENOSCOPY N/A 3/24/2021    Procedure: EGD (ESOPHAGOGASTRODUODENOSCOPY);  Surgeon: Helio Cheema MD;  Location: [facility] ([address] [place] [address]);  Service: Endoscopy;  Laterality: N/A;     EYE SURGERY  2016    Cataracts    FRACTURE SURGERY  2014    JOINT REPLACEMENT  2007    ORIF FEMUR FRACTURE Left     TOTAL KNEE ARTHROPLASTY Left 2007    TUBAL LIGATION         Time Tracking:     PT Received On: 02/16/23  PT Start Time: 1029     PT Stop Time: 1100  PT Total Time (min): 31 min     Billable Minutes: Evaluation 8 and Therapeutic Activity 23 02/16/2023

## 2023-02-16 NOTE — PLAN OF CARE
Problem: Adult Inpatient Plan of Care  Goal: Plan of Care Review  Outcome: Ongoing, Not Progressing  Goal: Patient-Specific Goal (Individualized)  Outcome: Ongoing, Not Progressing  Goal: Absence of Hospital-Acquired Illness or Injury  Outcome: Ongoing, Not Progressing  Goal: Optimal Comfort and Wellbeing  Outcome: Ongoing, Not Progressing  Goal: Readiness for Transition of Care  Outcome: Ongoing, Not Progressing     Problem: Fluid and Electrolyte Imbalance (Acute Kidney Injury/Impairment)  Goal: Fluid and Electrolyte Balance  Outcome: Ongoing, Not Progressing     Problem: Oral Intake Inadequate (Acute Kidney Injury/Impairment)  Goal: Optimal Nutrition Intake  Outcome: Ongoing, Not Progressing     Problem: Renal Function Impairment (Acute Kidney Injury/Impairment)  Goal: Effective Renal Function  Outcome: Ongoing, Not Progressing     Problem: Impaired Wound Healing  Goal: Optimal Wound Healing  Outcome: Ongoing, Not Progressing     Problem: Skin Injury Risk Increased  Goal: Skin Health and Integrity  Outcome: Ongoing, Not Progressing     Problem: Coping Ineffective  Goal: Effective Coping  Outcome: Ongoing, Not Progressing     Problem: Infection  Goal: Absence of Infection Signs and Symptoms  Outcome: Ongoing, Not Progressing      .

## 2023-02-17 LAB
ANION GAP SERPL CALC-SCNC: 9 MMOL/L (ref 8–16)
BASOPHILS # BLD AUTO: 0 K/UL (ref 0–0.2)
BASOPHILS NFR BLD: 0 % (ref 0–1.9)
BUN SERPL-MCNC: 17 MG/DL (ref 8–23)
CALCIUM SERPL-MCNC: 7.8 MG/DL (ref 8.7–10.5)
CHLORIDE SERPL-SCNC: 93 MMOL/L (ref 95–110)
CO2 SERPL-SCNC: 32 MMOL/L (ref 23–29)
CREAT SERPL-MCNC: 0.8 MG/DL (ref 0.5–1.4)
DIFFERENTIAL METHOD: ABNORMAL
EOSINOPHIL # BLD AUTO: 0.2 K/UL (ref 0–0.5)
EOSINOPHIL NFR BLD: 1.9 % (ref 0–8)
ERYTHROCYTE [DISTWIDTH] IN BLOOD BY AUTOMATED COUNT: 16.7 % (ref 11.5–14.5)
EST. GFR  (NO RACE VARIABLE): >60 ML/MIN/1.73 M^2
GLUCOSE SERPL-MCNC: 108 MG/DL (ref 70–110)
HCT VFR BLD AUTO: 23.4 % (ref 37–48.5)
HGB BLD-MCNC: 7.4 G/DL (ref 12–16)
IMM GRANULOCYTES # BLD AUTO: 0.06 K/UL (ref 0–0.04)
IMM GRANULOCYTES NFR BLD AUTO: 0.7 % (ref 0–0.5)
LYMPHOCYTES # BLD AUTO: 1.3 K/UL (ref 1–4.8)
LYMPHOCYTES NFR BLD: 14.8 % (ref 18–48)
MCH RBC QN AUTO: 32.3 PG (ref 27–31)
MCHC RBC AUTO-ENTMCNC: 31.6 G/DL (ref 32–36)
MCV RBC AUTO: 102 FL (ref 82–98)
MONOCYTES # BLD AUTO: 1.2 K/UL (ref 0.3–1)
MONOCYTES NFR BLD: 14.3 % (ref 4–15)
NEUTROPHILS # BLD AUTO: 5.9 K/UL (ref 1.8–7.7)
NEUTROPHILS NFR BLD: 68.3 % (ref 38–73)
NRBC BLD-RTO: 0 /100 WBC
PLATELET # BLD AUTO: 163 K/UL (ref 150–450)
PMV BLD AUTO: 10.6 FL (ref 9.2–12.9)
POTASSIUM SERPL-SCNC: 3.9 MMOL/L (ref 3.5–5.1)
RBC # BLD AUTO: 2.29 M/UL (ref 4–5.4)
SODIUM SERPL-SCNC: 134 MMOL/L (ref 136–145)
WBC # BLD AUTO: 8.61 K/UL (ref 3.9–12.7)

## 2023-02-17 PROCEDURE — 99233 SBSQ HOSP IP/OBS HIGH 50: CPT | Mod: ,,, | Performed by: INTERNAL MEDICINE

## 2023-02-17 PROCEDURE — 63600175 PHARM REV CODE 636 W HCPCS: Performed by: NURSE PRACTITIONER

## 2023-02-17 PROCEDURE — 63600175 PHARM REV CODE 636 W HCPCS: Performed by: HOSPITALIST

## 2023-02-17 PROCEDURE — 94660 CPAP INITIATION&MGMT: CPT

## 2023-02-17 PROCEDURE — 97530 THERAPEUTIC ACTIVITIES: CPT

## 2023-02-17 PROCEDURE — 25000003 PHARM REV CODE 250: Performed by: HOSPITALIST

## 2023-02-17 PROCEDURE — 99233 PR SUBSEQUENT HOSPITAL CARE,LEVL III: ICD-10-PCS | Mod: ,,, | Performed by: STUDENT IN AN ORGANIZED HEALTH CARE EDUCATION/TRAINING PROGRAM

## 2023-02-17 PROCEDURE — 25000242 PHARM REV CODE 250 ALT 637 W/ HCPCS: Performed by: INTERNAL MEDICINE

## 2023-02-17 PROCEDURE — 25000003 PHARM REV CODE 250

## 2023-02-17 PROCEDURE — 25000003 PHARM REV CODE 250: Performed by: INTERNAL MEDICINE

## 2023-02-17 PROCEDURE — 25000003 PHARM REV CODE 250: Performed by: NURSE PRACTITIONER

## 2023-02-17 PROCEDURE — 99233 PR SUBSEQUENT HOSPITAL CARE,LEVL III: ICD-10-PCS | Mod: ,,, | Performed by: INTERNAL MEDICINE

## 2023-02-17 PROCEDURE — 94640 AIRWAY INHALATION TREATMENT: CPT

## 2023-02-17 PROCEDURE — 63600175 PHARM REV CODE 636 W HCPCS: Performed by: INTERNAL MEDICINE

## 2023-02-17 PROCEDURE — 80048 BASIC METABOLIC PNL TOTAL CA: CPT | Performed by: NURSE PRACTITIONER

## 2023-02-17 PROCEDURE — 94761 N-INVAS EAR/PLS OXIMETRY MLT: CPT

## 2023-02-17 PROCEDURE — 99900035 HC TECH TIME PER 15 MIN (STAT)

## 2023-02-17 PROCEDURE — 36415 COLL VENOUS BLD VENIPUNCTURE: CPT | Performed by: NURSE PRACTITIONER

## 2023-02-17 PROCEDURE — 21400001 HC TELEMETRY ROOM

## 2023-02-17 PROCEDURE — 85025 COMPLETE CBC W/AUTO DIFF WBC: CPT | Performed by: INTERNAL MEDICINE

## 2023-02-17 PROCEDURE — 27000221 HC OXYGEN, UP TO 24 HOURS

## 2023-02-17 PROCEDURE — 99233 SBSQ HOSP IP/OBS HIGH 50: CPT | Mod: ,,, | Performed by: STUDENT IN AN ORGANIZED HEALTH CARE EDUCATION/TRAINING PROGRAM

## 2023-02-17 RX ORDER — PREDNISONE 20 MG/1
40 TABLET ORAL DAILY
Status: DISCONTINUED | OUTPATIENT
Start: 2023-02-17 | End: 2023-02-20 | Stop reason: HOSPADM

## 2023-02-17 RX ORDER — TORSEMIDE 20 MG/1
20 TABLET ORAL DAILY
Status: DISCONTINUED | OUTPATIENT
Start: 2023-02-17 | End: 2023-02-18

## 2023-02-17 RX ADMIN — GABAPENTIN 300 MG: 300 CAPSULE ORAL at 08:02

## 2023-02-17 RX ADMIN — MUPIROCIN: 20 OINTMENT TOPICAL at 08:02

## 2023-02-17 RX ADMIN — ATORVASTATIN CALCIUM 40 MG: 20 TABLET, FILM COATED ORAL at 09:02

## 2023-02-17 RX ADMIN — GABAPENTIN 300 MG: 300 CAPSULE ORAL at 03:02

## 2023-02-17 RX ADMIN — HEPARIN SODIUM 5000 UNITS: 5000 INJECTION INTRAVENOUS; SUBCUTANEOUS at 06:02

## 2023-02-17 RX ADMIN — IPRATROPIUM BROMIDE AND ALBUTEROL SULFATE 3 ML: 2.5; .5 SOLUTION RESPIRATORY (INHALATION) at 07:02

## 2023-02-17 RX ADMIN — IPRATROPIUM BROMIDE AND ALBUTEROL SULFATE 3 ML: 2.5; .5 SOLUTION RESPIRATORY (INHALATION) at 12:02

## 2023-02-17 RX ADMIN — IPRATROPIUM BROMIDE AND ALBUTEROL SULFATE 3 ML: 2.5; .5 SOLUTION RESPIRATORY (INHALATION) at 11:02

## 2023-02-17 RX ADMIN — Medication 400 MG: at 08:02

## 2023-02-17 RX ADMIN — METOPROLOL SUCCINATE 50 MG: 50 TABLET, EXTENDED RELEASE ORAL at 09:02

## 2023-02-17 RX ADMIN — GABAPENTIN 300 MG: 300 CAPSULE ORAL at 09:02

## 2023-02-17 RX ADMIN — FLUTICASONE FUROATE AND VILANTEROL TRIFENATATE 1 PUFF: 100; 25 POWDER RESPIRATORY (INHALATION) at 07:02

## 2023-02-17 RX ADMIN — PREDNISONE 40 MG: 20 TABLET ORAL at 06:02

## 2023-02-17 RX ADMIN — TRAZODONE HYDROCHLORIDE 100 MG: 50 TABLET ORAL at 08:02

## 2023-02-17 RX ADMIN — HYDROMORPHONE HYDROCHLORIDE 0.5 MG: 1 INJECTION, SOLUTION INTRAMUSCULAR; INTRAVENOUS; SUBCUTANEOUS at 01:02

## 2023-02-17 RX ADMIN — IPRATROPIUM BROMIDE AND ALBUTEROL SULFATE 3 ML: 2.5; .5 SOLUTION RESPIRATORY (INHALATION) at 03:02

## 2023-02-17 RX ADMIN — PANTOPRAZOLE SODIUM 40 MG: 40 TABLET, DELAYED RELEASE ORAL at 09:02

## 2023-02-17 RX ADMIN — TORSEMIDE 20 MG: 20 TABLET ORAL at 09:02

## 2023-02-17 RX ADMIN — HEPARIN SODIUM 5000 UNITS: 5000 INJECTION INTRAVENOUS; SUBCUTANEOUS at 09:02

## 2023-02-17 RX ADMIN — Medication 1000 UNITS: at 09:02

## 2023-02-17 RX ADMIN — Medication 250 MG: at 09:02

## 2023-02-17 RX ADMIN — DULOXETINE 60 MG: 30 CAPSULE, DELAYED RELEASE ORAL at 09:02

## 2023-02-17 RX ADMIN — COLLAGENASE SANTYL: 250 OINTMENT TOPICAL at 09:02

## 2023-02-17 RX ADMIN — HEPARIN SODIUM 5000 UNITS: 5000 INJECTION INTRAVENOUS; SUBCUTANEOUS at 03:02

## 2023-02-17 RX ADMIN — OXYCODONE HYDROCHLORIDE 5 MG: 5 TABLET ORAL at 08:02

## 2023-02-17 RX ADMIN — Medication 400 MG: at 09:02

## 2023-02-17 RX ADMIN — LOSARTAN POTASSIUM 100 MG: 50 TABLET, FILM COATED ORAL at 09:02

## 2023-02-17 RX ADMIN — THERA TABS 1 TABLET: TAB at 09:02

## 2023-02-17 RX ADMIN — HYDROMORPHONE HYDROCHLORIDE 0.5 MG: 1 INJECTION, SOLUTION INTRAMUSCULAR; INTRAVENOUS; SUBCUTANEOUS at 03:02

## 2023-02-17 NOTE — SUBJECTIVE & OBJECTIVE
Interval History/subjective:  - patient seen at bedside   - she is overall feeling well  - states she is awaiting a bed on the floor and is hopeful to be going home in the coming days  - she is open to completing the HCPOA paperwork now    Medications:  Continuous Infusions:  Scheduled Meds:   albuterol-ipratropium  3 mL Nebulization Q4H    ascorbic acid (vitamin C)  250 mg Oral Daily    atorvastatin  40 mg Oral Daily    collagenase   Topical (Top) Daily    diltiaZEM  180 mg Oral Daily    DULoxetine  60 mg Oral Daily    fluticasone furoate-vilanteroL  1 puff Inhalation Daily    gabapentin  300 mg Oral TID    heparin (porcine)  5,000 Units Subcutaneous Q8H    losartan  100 mg Oral Daily    magnesium oxide  400 mg Oral BID    metoprolol succinate  50 mg Oral Daily    multivitamin  1 tablet Oral Daily    mupirocin   Nasal BID    pantoprazole  40 mg Oral Daily    torsemide  20 mg Oral Daily    traZODone  100 mg Oral QHS    vitamin D  1,000 Units Oral Daily     PRN Meds:acetaminophen, HYDROmorphone, ondansetron, oxyCODONE, sodium chloride 0.9%    Objective:     Vital Signs (Most Recent):  Temp: 98.4 °F (36.9 °C) (02/17/23 0305)  Pulse: 110 (02/17/23 0738)  Resp: (!) 23 (02/17/23 0738)  BP: 129/62 (02/17/23 0305)  SpO2: 100 % (02/17/23 0738)   Vital Signs (24h Range):  Temp:  [98 °F (36.7 °C)-98.4 °F (36.9 °C)] 98.4 °F (36.9 °C)  Pulse:  [] 110  Resp:  [14-28] 23  SpO2:  [96 %-100 %] 100 %  BP: (115-135)/(55-63) 129/62     Weight: 95.1 kg (209 lb 10.5 oz)  Body mass index is 33.84 kg/m².    Physical Exam  Vitals and nursing note reviewed.   Constitutional:       General: She is not in acute distress.     Appearance: She is obese.   HENT:      Head: Normocephalic and atraumatic.   Eyes:      Extraocular Movements: Extraocular movements intact.   Pulmonary:      Effort: No respiratory distress.      Comments: On NC  Skin:     General: Skin is warm.   Neurological:      Mental Status: She is alert.      Comments:  Awake, alert, good insight, conversational   Psychiatric:         Mood and Affect: Mood normal.       Review of Symptoms        Living Arrangements:  Lives alone    Psychosocial/Cultural:   See Palliative Psychosocial Note: Yes  - lives alone at home  - has a friend that comes and checks on her  - in wheelchair   - doesn't do too much, enjoys staying home and watching tv  - 2 sons who live in . She is from  [place], but states it is too cold for her to go back to now unless she really needed to  **Primary  to Follow**  Palliative Care  Consult: No      Advance Care Planning   Advance Directives:   Medical Power of : Yes      Decision Making:  Patient answered questions  Goals of Care: What is most important right now is to focus on remaining as independent as possible, improvement in condition but with limits to invasive therapies. Accordingly, we have decided that the best plan to meet the patient's goals includes continuing with treatment.       Significant Labs: Reviewed  CBC:   Recent Labs   Lab 02/17/23 0338   WBC 8.61   HGB 7.4*   HCT 23.4*   *        BMP:  Recent Labs   Lab 02/17/23 0338      *   K 3.9   CL 93*   CO2 32*   BUN 17   CREATININE 0.8   CALCIUM 7.8*     LFT:  Lab Results   Component Value Date    AST 38 02/13/2023    ALKPHOS 101 02/13/2023    BILITOT 0.5 02/13/2023     Albumin:   Albumin   Date Value Ref Range Status   02/13/2023 2.5 (L) 3.5 - 5.2 g/dL Final     Protein:   Total Protein   Date Value Ref Range Status   02/13/2023 6.2 6.0 - 8.4 g/dL Final     Lactic acid:   Lab Results   Component Value Date    LACTATE 1.1 02/13/2023    LACTATE 0.7 01/17/2023       Significant Imaging: Reviewed

## 2023-02-17 NOTE — ASSESSMENT & PLAN NOTE
"Patient with COPD on Home O2 at 3L [place], Recurrent Lung Cancer, and HFpEF.  Recently admitted for COPD exacerbation.  Patient presented with shortness of breath that started over the last few days PTA, as well as intermittent diarrhea.  On arrival, oxygen saturations were 50% and placed on BiPAP.  CXR on admission with bibasilar opacities and mild increased interstitial attenuation suggestive for mild pulmonary edema, more pronounced within the lower lung zones.   Labs on admission  and Procal normal .  Patient started on IV Lasix with improvement and now off BiPAP.  Continues to improve and close to baseline.  Will change to home Torsemide today.  Wheezing a little worse today, but sating well and no significant tachypnea.      Patient seen by Pulmonary - "Improving edema, respiratory support, and symptoms. Patient appears stable for transfer to the floor with no further NIV required as she is back to her baseline home O2 requirements and not in distress. Would continue diuresis as much as possible prior to discharge. Critical care to sign off. Please call for any further concerns".     Plan:  D/c IV Lasix and start back on home Torsemide 20mg daily  Start on Prednisone 40mg qday x 5 days  Continue with Duonebs Q4  Continue with O2 at tolerated to keep SpO2 >92%   Strict intake and output    "

## 2023-02-17 NOTE — SUBJECTIVE & OBJECTIVE
Interval History: Patient is awake and alert this morning.  Feeling better since admission, but wheezing a little worse today. No chest pain.  No diarrhea since admission.    Review of Systems   Constitutional:  Positive for fatigue. Negative for fever.   HENT:  Negative for congestion, ear pain, rhinorrhea and sinus pressure.    Eyes:  Negative for pain and discharge.   Respiratory:  Positive for shortness of breath (improved and close to baseline) and wheezing (worse today). Negative for cough and chest tightness.    Cardiovascular:  Positive for leg swelling (trace now). Negative for chest pain and palpitations.   Gastrointestinal:  Negative for abdominal distention, abdominal pain, diarrhea, nausea and vomiting.   Endocrine: Negative for cold intolerance and heat intolerance.   Genitourinary:  Negative for difficulty urinating, flank pain, frequency, hematuria and urgency.   Musculoskeletal:  Negative for arthralgias, joint swelling and myalgias.   Skin:  Negative for rash.   Allergic/Immunologic: Negative for environmental allergies and food allergies.   Neurological:  Negative for dizziness, weakness, light-headedness and headaches.   Hematological:  Does not bruise/bleed easily.   Psychiatric/Behavioral:  Negative for agitation, behavioral problems and decreased concentration. The patient is nervous/anxious.    Objective:     Vital Signs (Most Recent):  Temp: 98.4 °F (36.9 °C) (02/17/23 0305)  Pulse: 97 (02/17/23 1200)  Resp: 14 (02/17/23 1336)  BP: (!) 108/58 (02/17/23 0701)  SpO2: 98 % (02/17/23 1200)   Vital Signs (24h Range):  Temp:  [98 °F (36.7 °C)-98.4 °F (36.9 °C)] 98.4 °F (36.9 °C)  Pulse:  [] 97  Resp:  [14-28] 14  SpO2:  [96 %-100 %] 98 %  BP: (108-135)/(58-63) 108/58     Weight: 95.1 kg (209 lb 10.5 oz)  Body mass index is 33.84 kg/m².    Intake/Output Summary (Last 24 hours) at 2/17/2023 1520  Last data filed at 2/17/2023 0345  Gross per 24 hour   Intake 960 ml   Output 2750 ml   Net -1790  ml        Physical Exam  Constitutional:       General: She is not in acute distress.     Appearance: She is well-developed. She is obese. She is ill-appearing (chronically). She is not toxic-appearing.   HENT:      Head: Normocephalic and atraumatic.      Right Ear: External ear normal.      Left Ear: External ear normal.      Nose: Nose normal.      Mouth/Throat:      Mouth: Mucous membranes are moist.      Pharynx: Oropharynx is clear.   Eyes:      General: No scleral icterus.        Right eye: No discharge.         Left eye: No discharge.      Conjunctiva/sclera: Conjunctivae normal.   Cardiovascular:      Rate and Rhythm: Normal rate and regular rhythm.      Pulses:           Radial pulses are 2+ on the right side and 2+ on the left side.      Heart sounds: Normal heart sounds.   Pulmonary:      Effort: Pulmonary effort is normal. No tachypnea or respiratory distress.      Breath sounds: Wheezing (little worse today) present. No rales.   Abdominal:      General: Bowel sounds are decreased. There is no distension.      Palpations: Abdomen is soft.      Tenderness: There is no abdominal tenderness.   Musculoskeletal:         General: Normal range of motion.      Cervical back: Normal range of motion and neck supple.      Right lower leg: Edema present.      Left lower leg: Edema present.      Comments: Trace   Skin:     General: Skin is warm and dry.      Coloration: Skin is pale.   Neurological:      Mental Status: She is alert and oriented to person, place, and time. Mental status is at baseline.      GCS: GCS eye subscore is 4. GCS verbal subscore is 5. GCS motor subscore is 6.      Motor: Motor function is intact.   Psychiatric:         Mood and Affect: Mood normal.         Speech: Speech normal.         Behavior: Behavior normal.       Significant Labs: All pertinent labs within the past 24 hours have been reviewed.    Significant Imaging: I have reviewed all pertinent imaging results/findings within the  past 24 hours.

## 2023-02-17 NOTE — PT/OT/SLP PROGRESS
Physical Therapy Treatment    Patient Name:  Nalini Varma   MRN:  [id number]    Recommendations:     Discharge Recommendations: home health PT  Discharge Equipment Recommendations: none  Barriers to discharge:  increased mob and transfer needs    Assessment:     Nalini Varma is a 72 y.o. female admitted with a medical diagnosis of Acute on chronic diastolic heart failure.  She presents with the following impairments/functional limitations: weakness, impaired endurance, impaired self care skills, impaired functional mobility, gait instability, impaired balance, decreased lower extremity function, pain, decreased ROM . Upon arrival, pt was elevated HOB on 3L/min O2. Pt was Margo sup<>sit. Pt was modA sit<>stand w/ RW and modified bed height. Pt amb 4small lateral steps w/ RW modA, increased difficulty wbing and performing foot clearance. Pt desat on during transitional mvmt to low 80s, pt has quick return w/ O2 sat w/ PLB after cessation of activity.     Rehab Prognosis: Good; patient would benefit from acute skilled PT services to address these deficits and reach maximum level of function.    Recent Surgery: * No surgery found *      Plan:     During this hospitalization, patient to be seen 4 x/week to address the identified rehab impairments via gait training, therapeutic activities, therapeutic exercises, wheelchair management/training, neuromuscular re-education and progress toward the following goals:    Plan of Care Expires:  03/18/23    Subjective     Chief Complaint: pain in L knee  Patient/Family Comments/goals: I want to be able to stand to get back into my wc.  Pain/Comfort:  Pain Rating 1: other (see comments) (did not rate, pain only during wbing)  Location - Side 1: Left  Location 1: knee  Pain Addressed 1: Reposition, Distraction, Cessation of Activity      Objective:     Communicated with Maribel BARRON prior to session.  Patient found HOB elevated with blood pressure cuff, telemetry, pulse ox  (continuous), PureWick, peripheral IV, oxygen upon PT entry to room.     General Precautions: Standard, fall  Orthopedic Precautions: N/A  Braces: N/A  Respiratory Status: Nasal cannula, flow 3 L/min     Functional Mobility:  Bed Mobility:     Supine to Sit: minimum assistance  Sit to Supine: minimum assistance  Transfers:     Sit to Stand:  moderate assistance with rolling walker  Balance: Good static sitting      AM-PAC 6 CLICK MOBILITY  Turning over in bed (including adjusting bedclothes, sheets and blankets)?: 3  Sitting down on and standing up from a chair with arms (e.g., wheelchair, bedside commode, etc.): 2  Moving from lying on back to sitting on the side of the bed?: 3  Moving to and from a bed to a chair (including a wheelchair)?: 2  Need to walk in hospital room?: 2  Climbing 3-5 steps with a railing?: 1  Basic Mobility Total Score: 13       Treatment & Education:  Importance of mob, safety awareness, transfer training, PLB    Patient left HOB elevated with all lines intact, call button in reach, and RN notified..    GOALS:   Multidisciplinary Problems       Physical Therapy Goals          Problem: Physical Therapy    Goal Priority Disciplines Outcome Goal Variances Interventions   Physical Therapy Goal     PT, PT/OT Ongoing, Progressing     Description: Goals to be met by: 3/18/23    Patient will perform the following to increase strength, improve mobility, and return to prior level of function:    1. Supine <> sit with SPV.  2. Stand pivot transfer bed <> WC with SBA.   3. Sit EOB for ~15 minutes with SBA without shortness of breath.                            Time Tracking:     PT Received On: 02/17/23  PT Start Time: 1330     PT Stop Time: 1359  PT Total Time (min): 29 min     Billable Minutes: Therapeutic Activity 29    Treatment Type: Treatment  PT/PTA: PT     PTA Visit Number: 0     02/17/2023

## 2023-02-17 NOTE — PROGRESS NOTES
[facility] - Intensive Care ([place])  Palliative Medicine  Progress Note    Patient Name: Nalini Varma  MRN: [id number]  Admission Date: 2/13/2023  Hospital Length of Stay: 4 days  Code Status: Full Code   Attending Provider: Paras Antonio MD  Consulting Provider: Lucho Gutierrez MD  Primary Care Physician: Yane Sahni MD  Principal Problem:Acute on chronic diastolic heart failure    Patient information was obtained from patient, past medical records and primary team.      Assessment/Plan:     Pulmonary  Acute on chronic respiratory failure with hypoxia and hypercapnia  - management per primary team/pulmonology     Cardiac/Vascular  * Acute on chronic diastolic heart failure  - management per cardiology and primary team    Renal/  CKD (chronic kidney disease) stage 3, GFR 30-59 ml/min  - noted hx. Management per primary team    Oncology  Recurrent adenocarcinoma of left lung  - s/p chemo and radiation  - has plans for further imaging coming up in late fab/early march per patient    Endocrine  Severe obesity  - contributing co-morbidity to overall picture    GI  Diarrhea  - on/off issue  - workup ongoing per primary team  - states imodium helps    Palliative Care  Advance care planning  2/17/2023:  - patient seen at bedside  - she was feeling improved compared to when admitted  - denies any concerns at this time  - willing to complete the HCPOA paperwork at this time  - HCPOA paperwork talked through with her and she filled it out with her eldest son Augie being her first choice and her youngest son Romain being her second choice.   - she had not further questions at this time  [] HCPOA paperwork photocopied with original given to patient, copy placed in chart and with it also uploaded into the chart to show up under ACP too.     2/13/2023:  - patient seen at bedside with palliative RN Amanda  - introduced self and re-introduced palliative care role  - patient welcomes us to talk with her  - she has a good  overall understanding of her current medical ongoings  - she is glad her breathing is improved from when first came in  - mentioned that the bipap mask can be so uncomfortable and is glad to be stabilized on her nasal cannula again  - at home is wheelchair bound and lives alone, but states she manages well  - she is not interested in a NH or such at this time and said if she ever felt that would be best, she would probably want to go to  for that to be closer to her sons. Not interested in going  sooner because it is too cold otherwise.   - she has friend that comes and helps out at home from time to time  - she states her goals going forward are to get stronger and re-gain as much mobility/independence as possible. She knows that walking again is likely not in the picture due to her L femur injury and not being further surgical candidate per her discussions with ortho in the past. That is why she ended up being mainly wheelchair bound.   - she enjoys her life and feels it is a life worth living. She wants to do what is needed to optimize her condition as much as possible  - discussed code status and she mentioned that is something she wants to think more about and discuss further with her sons too. She is not sure if she would want intubation, but was open to CPR. Made her aware cannot get CPR without intubation but can get intubation without CPR. She stated she is going to think on it more. Let her know there is no rush/pressure and she can ask any further questions she may have  - in terms of HCPOA she would like it to be her oldest son only, but wants to have the paperwork and read it at her leisure then complete it. Currently she has a headache and would like to get tylenol, rest then read through it after  [] made nurse aware of patients headache and wanting tylenol to help  [] continue with current level of full medical management   [] patients goal is to get stronger and optimize medical condition as  "much as possible   [] provided with advanced directive paperwork to read over        I will follow this patient peripherally via chart review. Please re-contact us via epic chat if you have any additional questions or concerns. Thank you!    Subjective:     Chief Complaint:   Chief Complaint   Patient presents with    Shortness of Breath     Pt has progressively gotten worse shortness of breath throughout the night - pt called 911, flo arrived found the pt with an spo2 in the 50's - aasi gave the pt 1 duo neb treatments, 3 sprays of nitro and placed the pt on cpap -        HPI:   Per H&P: "The patient is a 72 y.o. female with a past medical history of COPD (on home O2 3L) with 53 pack year smoking hx, current smoker, lung CA, chronic anemia, anxiety, CKD Stage 3, systolic and diastolic heart failure who presents with shortness of breath that started over the last few days.  Also has some diarrhea.  On arrival, oxygen saturations were 50%.  Initially started on nasal cannula with no improvement and was placed on BiPAP.  She also received 1 breathing treatment as well as 0.4 mg of nitroglycerin spray.  On initial workup, the patient's BNP is elevated to 378, some peripheral edema.  She will be admitted for further management of her acute on chronic respiratory failure with hypoxia and acute on chronic systolic heart failure."     Palliative care consulted for assistance with GOC.       Hospital Course:  No notes on file    Interval History/subjective:  - patient seen at bedside   - she is overall feeling well  - states she is awaiting a bed on the floor and is hopeful to be going home in the coming days  - she is open to completing the HCPOA paperwork now    Medications:  Continuous Infusions:  Scheduled Meds:   albuterol-ipratropium  3 mL Nebulization Q4H    ascorbic acid (vitamin C)  250 mg Oral Daily    atorvastatin  40 mg Oral Daily    collagenase   Topical (Top) Daily    diltiaZEM  180 mg Oral Daily    " DULoxetine  60 mg Oral Daily    fluticasone furoate-vilanteroL  1 puff Inhalation Daily    gabapentin  300 mg Oral TID    heparin (porcine)  5,000 Units Subcutaneous Q8H    losartan  100 mg Oral Daily    magnesium oxide  400 mg Oral BID    metoprolol succinate  50 mg Oral Daily    multivitamin  1 tablet Oral Daily    mupirocin   Nasal BID    pantoprazole  40 mg Oral Daily    torsemide  20 mg Oral Daily    traZODone  100 mg Oral QHS    vitamin D  1,000 Units Oral Daily     PRN Meds:acetaminophen, HYDROmorphone, ondansetron, oxyCODONE, sodium chloride 0.9%    Objective:     Vital Signs (Most Recent):  Temp: 98.4 °F (36.9 °C) (02/17/23 0305)  Pulse: 110 (02/17/23 0738)  Resp: (!) 23 (02/17/23 0738)  BP: 129/62 (02/17/23 0305)  SpO2: 100 % (02/17/23 0738)   Vital Signs (24h Range):  Temp:  [98 °F (36.7 °C)-98.4 °F (36.9 °C)] 98.4 °F (36.9 °C)  Pulse:  [] 110  Resp:  [14-28] 23  SpO2:  [96 %-100 %] 100 %  BP: (115-135)/(55-63) 129/62     Weight: 95.1 kg (209 lb 10.5 oz)  Body mass index is 33.84 kg/m².    Physical Exam  Vitals and nursing note reviewed.   Constitutional:       General: She is not in acute distress.     Appearance: She is obese.   HENT:      Head: Normocephalic and atraumatic.   Eyes:      Extraocular Movements: Extraocular movements intact.   Pulmonary:      Effort: No respiratory distress.      Comments: On NC  Skin:     General: Skin is warm.   Neurological:      Mental Status: She is alert.      Comments: Awake, alert, good insight, conversational   Psychiatric:         Mood and Affect: Mood normal.       Review of Symptoms        Living Arrangements:  Lives alone    Psychosocial/Cultural:   See Palliative Psychosocial Note: Yes  - lives alone at home  - has a friend that comes and checks on her  - in wheelchair   - doesn't do too much, enjoys staying home and watching tv  - 2 sons who live in . She is from  too, but states it is too cold for her to go back to now unless she  really needed to  **Primary  to Follow**  Palliative Care  Consult: No      Advance Care Planning   Advance Directives:   Medical Power of : Yes      Decision Making:  Patient answered questions  Goals of Care: What is most important right now is to focus on remaining as independent as possible, improvement in condition but with limits to invasive therapies. Accordingly, we have decided that the best plan to meet the patient's goals includes continuing with treatment.       Significant Labs: Reviewed  CBC:   Recent Labs   Lab 02/17/23 0338   WBC 8.61   HGB 7.4*   HCT 23.4*   *        BMP:  Recent Labs   Lab 02/17/23 0338      *   K 3.9   CL 93*   CO2 32*   BUN 17   CREATININE 0.8   CALCIUM 7.8*     LFT:  Lab Results   Component Value Date    AST 38 02/13/2023    ALKPHOS 101 02/13/2023    BILITOT 0.5 02/13/2023     Albumin:   Albumin   Date Value Ref Range Status   02/13/2023 2.5 (L) 3.5 - 5.2 g/dL Final     Protein:   Total Protein   Date Value Ref Range Status   02/13/2023 6.2 6.0 - 8.4 g/dL Final     Lactic acid:   Lab Results   Component Value Date    LACTATE 1.1 02/13/2023    LACTATE 0.7 01/17/2023       Significant Imaging: Reviewed    > 50% of 35 min visit spent in chart review, face to face discussion of symptom assessment, completing of ACP paperwork, and d/c planning      Lucho Gutierrez MD  Palliative Medicine  [facility] [employer])

## 2023-02-17 NOTE — PROGRESS NOTES
[facility]ensive [facility] Medicine  Progress Note    Patient Name: Nalini Varma  MRN: [id number]  Patient Class: IP- Inpatient   Admission Date: 2/13/2023  Length of Stay: 4 days  Attending Physician: Paras Antonio MD  Primary Care Provider: Yane Sahni MD        Subjective:     Principal Problem:Acute on chronic diastolic heart failure      HPI:  The patient is a 72 y.o. female with a past medical history of COPD (on home O2 3L) with 53 pack year smoking hx, current smoker, lung CA, chronic anemia, anxiety, CKD Stage 3, systolic and diastolic heart failure who presents with shortness of breath that started over the last few days.  Also has some diarrhea.  On arrival, oxygen saturations were 50%.  Initially started on nasal cannula with no improvement and was placed on BiPAP.  She also received 1 breathing treatment as well as 0.4 mg of nitroglycerin spray.  On initial workup, the patient's BNP is elevated to 378, some peripheral edema.  She will be admitted for further management of her acute on chronic respiratory failure with hypoxia and acute on chronic systolic heart failure.      Overview/Hospital Course:  No notes on file    Interval History: Patient is awake and alert this morning.  Feeling better since admission, but wheezing a little worse today. No chest pain.  No diarrhea since admission.    Review of Systems   Constitutional:  Positive for fatigue. Negative for fever.   HENT:  Negative for congestion, ear pain, rhinorrhea and sinus pressure.    Eyes:  Negative for pain and discharge.   Respiratory:  Positive for shortness of breath (improved and close to baseline) and wheezing (worse today). Negative for cough and chest tightness.    Cardiovascular:  Positive for leg swelling (trace now). Negative for chest pain and palpitations.   Gastrointestinal:  Negative for abdominal distention, abdominal pain, diarrhea, nausea and vomiting.   Endocrine: Negative for cold  intolerance and heat intolerance.   Genitourinary:  Negative for difficulty urinating, flank pain, frequency, hematuria and urgency.   Musculoskeletal:  Negative for arthralgias, joint swelling and myalgias.   Skin:  Negative for rash.   Allergic/Immunologic: Negative for environmental allergies and food allergies.   Neurological:  Negative for dizziness, weakness, light-headedness and headaches.   Hematological:  Does not bruise/bleed easily.   Psychiatric/Behavioral:  Negative for agitation, behavioral problems and decreased concentration. The patient is nervous/anxious.    Objective:     Vital Signs (Most Recent):  Temp: 98.4 °F (36.9 °C) (02/17/23 0305)  Pulse: 97 (02/17/23 1200)  Resp: 14 (02/17/23 1336)  BP: (!) 108/58 (02/17/23 0701)  SpO2: 98 % (02/17/23 1200)   Vital Signs (24h Range):  Temp:  [98 °F (36.7 °C)-98.4 °F (36.9 °C)] 98.4 °F (36.9 °C)  Pulse:  [] 97  Resp:  [14-28] 14  SpO2:  [96 %-100 %] 98 %  BP: (108-135)/(58-63) 108/58     Weight: 95.1 kg (209 lb 10.5 oz)  Body mass index is 33.84 kg/m².    Intake/Output Summary (Last 24 hours) at 2/17/2023 1520  Last data filed at 2/17/2023 0345  Gross per 24 hour   Intake 960 ml   Output 2750 ml   Net -1790 ml        Physical Exam  Constitutional:       General: She is not in acute distress.     Appearance: She is well-developed. She is obese. She is ill-appearing (chronically). She is not toxic-appearing.   HENT:      Head: Normocephalic and atraumatic.      Right Ear: External ear normal.      Left Ear: External ear normal.      Nose: Nose normal.      Mouth/Throat:      Mouth: Mucous membranes are moist.      Pharynx: Oropharynx is clear.   Eyes:      General: No scleral icterus.        Right eye: No discharge.         Left eye: No discharge.      Conjunctiva/sclera: Conjunctivae normal.   Cardiovascular:      Rate and Rhythm: Normal rate and regular rhythm.      Pulses:           Radial pulses are 2+ on the right side and 2+ on the left side.       Heart sounds: Normal heart sounds.   Pulmonary:      Effort: Pulmonary effort is normal. No tachypnea or respiratory distress.      Breath sounds: Wheezing (little worse today) present. No rales.   Abdominal:      General: Bowel sounds are decreased. There is no distension.      Palpations: Abdomen is soft.      Tenderness: There is no abdominal tenderness.   Musculoskeletal:         General: Normal range of motion.      Cervical back: Normal range of motion and neck supple.      Right lower leg: Edema present.      Left lower leg: Edema present.      Comments: Trace   Skin:     General: Skin is warm and dry.      Coloration: Skin is pale.   Neurological:      Mental Status: She is alert and oriented to person, place, and time. Mental status is at baseline.      GCS: GCS eye subscore is 4. GCS verbal subscore is 5. GCS motor subscore is 6.      Motor: Motor function is intact.   Psychiatric:         Mood and Affect: Mood normal.         Speech: Speech normal.         Behavior: Behavior normal.       Significant Labs: All pertinent labs within the past 24 hours have been reviewed.    Significant Imaging: I have reviewed all pertinent imaging results/findings within the past 24 hours.      Assessment/Plan:      * Acute on chronic diastolic heart failure  TTE on admission with EF of 60% and Grade III DD  -See above      Acute on chronic respiratory failure with hypoxia and hypercapnia  Patient with COPD on Home O2 at 3L NC, Recurrent Lung Cancer, and HFpEF.  Recently admitted for COPD exacerbation.  Patient presented with shortness of breath that started over the last few days PTA, as well as intermittent diarrhea.  On arrival, oxygen saturations were 50% and placed on BiPAP.  CXR on admission with bibasilar opacities and mild increased interstitial attenuation suggestive for mild pulmonary edema, more pronounced within the lower lung zones.   Labs on admission  and Procal normal .  Patient started on IV Lasix  "with improvement and now off BiPAP.  Continues to improve and close to baseline.  Will change to home Torsemide today.  Wheezing a little worse today, but sating well and no significant tachypnea.      Patient seen by Pulmonary - "Improving edema, respiratory support, and symptoms. Patient appears stable for transfer to the floor with no further NIV required as she is back to her baseline home O2 requirements and not in distress. Would continue diuresis as much as possible prior to discharge. Critical care to sign off. Please call for any further concerns".     Plan:  D/c IV Lasix and start back on home Torsemide 20mg daily  Start on Prednisone 40mg qday x 5 days  Continue with Duonebs Q4  Continue with O2 at tolerated to keep SpO2 >92%   Strict intake and output      Chronic obstructive pulmonary disease  COPD with PFTs confirming diagnosis in 2020 on Home O2 3L. Did not appear to be in COPD exacerbation on admission, but more expiratory wheezing on 2/15/2023.  DuoNebs changed to standing and lung exam improved yesterday, but again more wheezing today.  -Start Prednisone 40mg q day x 4 days  -Continue Standing DuoNebs q4  -Continue triple therapy with LABA/LAMA/ICS  -Continue O2 support for goal 02 88-92%  -Discussed importance of smoking cessation      Diarrhea  Patient recently completed a course of po Vanc for C Diff.  Had recurrent diarrhea at home, but none since admission.   -Monitor          Advance care planning  Followed by Palliative Care.  Patient is Full Code at this time      Recurrent adenocarcinoma of left lung  Per Pulmonary - "Recurrent stage 1 disease with unknown staging as there was not follow up biopsy performed. She was undergoing chemo/rads for this in December but she stopped chemotherapy and radiation in January per patients request. Plans for repeat surveillance CT scan in 1 month"    CKD (chronic kidney disease) stage 3, GFR 30-59 ml/min  Creatinine .8, at baseline    Monitor for acute " decompensation      Severe obesity  Body mass index is 33.84 kg/m². Morbid obesity complicates all aspects of disease management from diagnostic modalities to treatment. Weight loss encouraged and health benefits explained to patient.         Hyperkalemia  K- 5.4    Recheck BMP with AM labs  Lasix    Generalized anxiety disorder  Hx of anxiety, MDD - Chronic. Controlled.  -Continue home meds of Duloxetine 60mg daily    Hyperlipidemia  -Continue Lipitor      Essential hypertension  Normotensive currently  -Continue with Home Metoprolol and Losartan        VTE Risk Mitigation (From admission, onward)         Ordered     heparin (porcine) injection 5,000 Units  Every 8 hours         02/13/23 0447     IP VTE HIGH RISK PATIENT  Once         02/13/23 0447     Place sequential compression device  Until discontinued         02/13/23 0447                Discharge Planning   FELICIANO:      Code Status: Full Code   Is the patient medically ready for discharge?:     Reason for patient still in hospital (select all that apply): Patient trending condition, Treatment and Consult recommendations  Discharge Plan A: Home with family                  Paras Antonio MD  Department of Hospital Medicine   [facility], [place] [facility] ([place])

## 2023-02-17 NOTE — ASSESSMENT & PLAN NOTE
COPD with PFTs confirming diagnosis in 2020 on Home O2 3L. Did not appear to be in COPD exacerbation on admission, but more expiratory wheezing on 2/15/2023.  DuoNebs changed to standing and lung exam improved yesterday, but again more wheezing today.  -Start Prednisone 40mg q day x 4 days  -Continue Standing DuoNebs q4  -Continue triple therapy with LABA/LAMA/ICS  -Continue O2 support for goal 02 88-92%  -Discussed importance of smoking cessation

## 2023-02-17 NOTE — ASSESSMENT & PLAN NOTE
2/17/2023:  - patient seen at bedside  - she was feeling improved compared to when admitted  - denies any concerns at this time  - willing to complete the HCPOA paperwork at this time  - HCPOA paperwork talked through with her and she filled it out with her eldest son Augie being her first choice and her youngest son Romain being her second choice.   - she had not further questions at this time  [] HCPOA paperwork photocopied with original given to patient, copy placed in chart and with it also uploaded into the chart to show up under ACP too.     2/13/2023:  - patient seen at bedside with palliative RN Amanda  - introduced self and re-introduced palliative care role  - patient welcomes us to talk with her  - she has a good overall understanding of her current medical ongoings  - she is glad her breathing is improved from when first came in  - mentioned that the bipap mask can be so uncomfortable and is glad to be stabilized on her nasal cannula again  - at home is wheelchair bound and lives alone, but states she manages well  - she is not interested in a NH or such at this time and said if she ever felt that would be best, she would probably want to go to  for that to be closer to her sons. Not interested in going  sooner because it is too cold otherwise.   - she has friend that comes and helps out at home from time to time  - she states her goals going forward are to get stronger and re-gain as much mobility/independence as possible. She knows that walking again is likely not in the picture due to her L femur injury and not being further surgical candidate per her discussions with ortho in the past. That is why she ended up being mainly wheelchair bound.   - she enjoys her life and feels it is a life worth living. She wants to do what is needed to optimize her condition as much as possible  - discussed code status and she mentioned that is something she wants to think more about and discuss further with her sons  too. She is not sure if she would want intubation, but was open to CPR. Made her aware cannot get CPR without intubation but can get intubation without CPR. She stated she is going to think on it more. Let her know there is no rush/pressure and she can ask any further questions she may have  - in terms of HCPOA she would like it to be her oldest son only, but wants to have the paperwork and read it at her leisure then complete it. Currently she has a headache and would like to get tylenol, rest then read through it after  [] made nurse aware of patients headache and wanting tylenol to help  [] continue with current level of full medical management   [] patients goal is to get stronger and optimize medical condition as much as possible   [] provided with advanced directive paperwork to read over

## 2023-02-18 LAB
ANION GAP SERPL CALC-SCNC: 6 MMOL/L (ref 8–16)
BUN SERPL-MCNC: 18 MG/DL (ref 8–23)
CALCIUM SERPL-MCNC: 8.1 MG/DL (ref 8.7–10.5)
CHLORIDE SERPL-SCNC: 94 MMOL/L (ref 95–110)
CO2 SERPL-SCNC: 32 MMOL/L (ref 23–29)
CREAT SERPL-MCNC: 0.9 MG/DL (ref 0.5–1.4)
EST. GFR  (NO RACE VARIABLE): >60 ML/MIN/1.73 M^2
GLUCOSE SERPL-MCNC: 229 MG/DL (ref 70–110)
POTASSIUM SERPL-SCNC: 4.1 MMOL/L (ref 3.5–5.1)
SODIUM SERPL-SCNC: 132 MMOL/L (ref 136–145)

## 2023-02-18 PROCEDURE — 94761 N-INVAS EAR/PLS OXIMETRY MLT: CPT

## 2023-02-18 PROCEDURE — 94640 AIRWAY INHALATION TREATMENT: CPT

## 2023-02-18 PROCEDURE — 63600175 PHARM REV CODE 636 W HCPCS: Performed by: HOSPITALIST

## 2023-02-18 PROCEDURE — 25000003 PHARM REV CODE 250: Performed by: NURSE PRACTITIONER

## 2023-02-18 PROCEDURE — 21400001 HC TELEMETRY ROOM

## 2023-02-18 PROCEDURE — 99233 SBSQ HOSP IP/OBS HIGH 50: CPT | Mod: ,,, | Performed by: INTERNAL MEDICINE

## 2023-02-18 PROCEDURE — 25000003 PHARM REV CODE 250: Performed by: INTERNAL MEDICINE

## 2023-02-18 PROCEDURE — 99233 PR SUBSEQUENT HOSPITAL CARE,LEVL III: ICD-10-PCS | Mod: ,,, | Performed by: INTERNAL MEDICINE

## 2023-02-18 PROCEDURE — 36415 COLL VENOUS BLD VENIPUNCTURE: CPT | Performed by: NURSE PRACTITIONER

## 2023-02-18 PROCEDURE — 27000221 HC OXYGEN, UP TO 24 HOURS

## 2023-02-18 PROCEDURE — 25000242 PHARM REV CODE 250 ALT 637 W/ HCPCS: Performed by: INTERNAL MEDICINE

## 2023-02-18 PROCEDURE — 94660 CPAP INITIATION&MGMT: CPT

## 2023-02-18 PROCEDURE — 63600175 PHARM REV CODE 636 W HCPCS: Performed by: INTERNAL MEDICINE

## 2023-02-18 PROCEDURE — 63600175 PHARM REV CODE 636 W HCPCS: Performed by: NURSE PRACTITIONER

## 2023-02-18 PROCEDURE — 80048 BASIC METABOLIC PNL TOTAL CA: CPT | Performed by: NURSE PRACTITIONER

## 2023-02-18 PROCEDURE — 25000003 PHARM REV CODE 250

## 2023-02-18 PROCEDURE — 99900035 HC TECH TIME PER 15 MIN (STAT)

## 2023-02-18 PROCEDURE — 25000003 PHARM REV CODE 250: Performed by: HOSPITALIST

## 2023-02-18 RX ORDER — TORSEMIDE 20 MG/1
20 TABLET ORAL 2 TIMES DAILY
Status: DISCONTINUED | OUTPATIENT
Start: 2023-02-18 | End: 2023-02-20 | Stop reason: HOSPADM

## 2023-02-18 RX ORDER — HYDROMORPHONE HYDROCHLORIDE 1 MG/ML
0.5 INJECTION, SOLUTION INTRAMUSCULAR; INTRAVENOUS; SUBCUTANEOUS EVERY 4 HOURS PRN
Status: DISCONTINUED | OUTPATIENT
Start: 2023-02-18 | End: 2023-02-20 | Stop reason: HOSPADM

## 2023-02-18 RX ADMIN — OXYCODONE HYDROCHLORIDE 5 MG: 5 TABLET ORAL at 11:02

## 2023-02-18 RX ADMIN — PREDNISONE 40 MG: 20 TABLET ORAL at 08:02

## 2023-02-18 RX ADMIN — Medication 400 MG: at 09:02

## 2023-02-18 RX ADMIN — Medication 250 MG: at 08:02

## 2023-02-18 RX ADMIN — HEPARIN SODIUM 5000 UNITS: 5000 INJECTION INTRAVENOUS; SUBCUTANEOUS at 09:02

## 2023-02-18 RX ADMIN — IPRATROPIUM BROMIDE AND ALBUTEROL SULFATE 3 ML: 2.5; .5 SOLUTION RESPIRATORY (INHALATION) at 03:02

## 2023-02-18 RX ADMIN — ATORVASTATIN CALCIUM 40 MG: 20 TABLET, FILM COATED ORAL at 08:02

## 2023-02-18 RX ADMIN — DULOXETINE 60 MG: 30 CAPSULE, DELAYED RELEASE ORAL at 08:02

## 2023-02-18 RX ADMIN — GABAPENTIN 300 MG: 300 CAPSULE ORAL at 02:02

## 2023-02-18 RX ADMIN — HEPARIN SODIUM 5000 UNITS: 5000 INJECTION INTRAVENOUS; SUBCUTANEOUS at 05:02

## 2023-02-18 RX ADMIN — DILTIAZEM HYDROCHLORIDE 180 MG: 180 CAPSULE, COATED, EXTENDED RELEASE ORAL at 08:02

## 2023-02-18 RX ADMIN — GABAPENTIN 300 MG: 300 CAPSULE ORAL at 08:02

## 2023-02-18 RX ADMIN — HYDROMORPHONE HYDROCHLORIDE 0.5 MG: 1 INJECTION, SOLUTION INTRAMUSCULAR; INTRAVENOUS; SUBCUTANEOUS at 09:02

## 2023-02-18 RX ADMIN — PANTOPRAZOLE SODIUM 40 MG: 40 TABLET, DELAYED RELEASE ORAL at 08:02

## 2023-02-18 RX ADMIN — TORSEMIDE 20 MG: 20 TABLET ORAL at 05:02

## 2023-02-18 RX ADMIN — HEPARIN SODIUM 5000 UNITS: 5000 INJECTION INTRAVENOUS; SUBCUTANEOUS at 02:02

## 2023-02-18 RX ADMIN — IPRATROPIUM BROMIDE AND ALBUTEROL SULFATE 3 ML: 2.5; .5 SOLUTION RESPIRATORY (INHALATION) at 11:02

## 2023-02-18 RX ADMIN — IPRATROPIUM BROMIDE AND ALBUTEROL SULFATE 3 ML: 2.5; .5 SOLUTION RESPIRATORY (INHALATION) at 07:02

## 2023-02-18 RX ADMIN — FLUTICASONE FUROATE AND VILANTEROL TRIFENATATE 1 PUFF: 100; 25 POWDER RESPIRATORY (INHALATION) at 07:02

## 2023-02-18 RX ADMIN — TRAZODONE HYDROCHLORIDE 100 MG: 50 TABLET ORAL at 08:02

## 2023-02-18 RX ADMIN — COLLAGENASE SANTYL: 250 OINTMENT TOPICAL at 08:02

## 2023-02-18 RX ADMIN — LOSARTAN POTASSIUM 100 MG: 50 TABLET, FILM COATED ORAL at 08:02

## 2023-02-18 RX ADMIN — THERA TABS 1 TABLET: TAB at 08:02

## 2023-02-18 RX ADMIN — Medication 1000 UNITS: at 08:02

## 2023-02-18 RX ADMIN — METOPROLOL SUCCINATE 50 MG: 50 TABLET, EXTENDED RELEASE ORAL at 08:02

## 2023-02-18 RX ADMIN — TORSEMIDE 20 MG: 20 TABLET ORAL at 08:02

## 2023-02-18 RX ADMIN — Medication 400 MG: at 08:02

## 2023-02-18 NOTE — NURSING
Pt admitted to room from ICU/ alert and oriented . Vs stable. O2 on at 3l with O2 sat 99%. Purwick in place. Expiratory whezing noted is audible. Telemetry monitor placed onSR noted with rate 77. Denies pain or distress at this time.

## 2023-02-18 NOTE — ASSESSMENT & PLAN NOTE
"Patient with COPD on Home O2 at 3L NC, Recurrent Lung Cancer, and HFpEF.  Recently admitted for COPD exacerbation.  Patient presented with shortness of breath that started over the last few days PTA, as well as intermittent diarrhea.  On arrival, oxygen saturations were 50% and placed on BiPAP.  CXR on admission with bibasilar opacities and mild increased interstitial attenuation suggestive for mild pulmonary edema, more pronounced within the lower lung zones.   Labs on admission  and Procal normal .  Patient started on IV Lasix with improvement and now off BiPAP.  Continues to improve and close to baseline.  Changed to po Torsemide on 2/17/2023.  Wheezing a little worse since 2/17/2023, so started on burst steroids.  No significant improvement this morning.     Patient seen by Pulmonary - "Improving edema, respiratory support, and symptoms. Patient appears stable for transfer to the floor with no further NIV required as she is back to her baseline home O2 requirements and not in distress. Would continue diuresis as much as possible prior to discharge. Critical care to sign off. Please call for any further concerns".     Plan:  Continue with Torsemide 20mg, but increase to bid   Continue with Prednisone 40mg qday x 5 days - day #2/5.  Continue with Duonebs Q4 standing  Continue with O2 at tolerated to keep SpO2 >92%   Strict intake and output    "

## 2023-02-18 NOTE — ASSESSMENT & PLAN NOTE
COPD with PFTs confirming diagnosis in 2020 on Home O2 3L. Did not appear to be in COPD exacerbation on admission, but more expiratory wheezing on 2/15/2023.  DuoNebs changed to standing and lung exam improved yesterday, but again more wheezing today.  -Continue with Prednisone 40mg q day x 5 days - day #2/5 today  -Continue Standing DuoNebs q4  -Continue triple therapy with LABA/LAMA/ICS  -Continue O2 support for goal 02 88-92%  -Discussed importance of smoking cessation

## 2023-02-18 NOTE — PLAN OF CARE
Problem: Adult Inpatient Plan of Care  Goal: Plan of Care Review  Outcome: Ongoing, Progressing  Goal: Patient-Specific Goal (Individualized)  Outcome: Ongoing, Progressing  Goal: Absence of Hospital-Acquired Illness or Injury  Outcome: Ongoing, Progressing  Goal: Optimal Comfort and Wellbeing  Outcome: Ongoing, Progressing     Problem: Fluid and Electrolyte Imbalance (Acute Kidney Injury/Impairment)  Goal: Fluid and Electrolyte Balance  Outcome: Ongoing, Progressing     Problem: Renal Function Impairment (Acute Kidney Injury/Impairment)  Goal: Effective Renal Function  Outcome: Ongoing, Progressing     Problem: Infection  Goal: Absence of Infection Signs and Symptoms  Outcome: Ongoing, Progressing     POC reviewed with patient. All questions and concerns addressed. Fall/safety precautions implemented and maintained. Purewick care performed. Pain management provided. Breathing tx q4h per RT. No acute events noted this shift. Please see flowsheet for full assessment and vitals. Bed locked in lowest position. Side rails up x2. Call bell within reach. Will continue to monitor.

## 2023-02-18 NOTE — ASSESSMENT & PLAN NOTE
Body mass index is 35.01 kg/m². Morbid obesity complicates all aspects of disease management from diagnostic modalities to treatment. Weight loss encouraged and health benefits explained to patient.

## 2023-02-18 NOTE — SUBJECTIVE & OBJECTIVE
Interval History: Patient is awake and alert this morning.  Feels her wheezing is a little worse today.  No chest pain.  No diarrhea.    Review of Systems   Constitutional:  Positive for fatigue (improving). Negative for fever.   HENT:  Negative for congestion, ear pain, rhinorrhea and sinus pressure.    Eyes:  Negative for pain and discharge.   Respiratory:  Positive for shortness of breath (improved from admission, but a little worse today) and wheezing (worse today). Negative for cough and chest tightness.    Cardiovascular:  Positive for leg swelling (trace now). Negative for chest pain and palpitations.   Gastrointestinal:  Negative for abdominal distention, abdominal pain, diarrhea, nausea and vomiting.   Endocrine: Negative for cold intolerance and heat intolerance.   Genitourinary:  Negative for difficulty urinating, flank pain, frequency, hematuria and urgency.   Musculoskeletal:  Negative for arthralgias, joint swelling and myalgias.   Skin:  Negative for rash.   Allergic/Immunologic: Negative for environmental allergies and food allergies.   Neurological:  Negative for dizziness, weakness, light-headedness and headaches.   Hematological:  Does not bruise/bleed easily.   Psychiatric/Behavioral:  Negative for agitation, behavioral problems and decreased concentration. The patient is nervous/anxious (stable).    Objective:     Vital Signs (Most Recent):  Temp: 97.6 °F (36.4 °C) (02/18/23 0608)  Pulse: 85 (02/18/23 0800)  Resp: 18 (02/18/23 0741)  BP: 133/63 (02/18/23 0608)  SpO2: 97 % (02/18/23 0738)   Vital Signs (24h Range):  Temp:  [97.6 °F (36.4 °C)-98.7 °F (37.1 °C)] 97.6 °F (36.4 °C)  Pulse:  [] 85  Resp:  [14-35] 18  SpO2:  [95 %-100 %] 97 %  BP: (110-150)/(54-67) 133/63     Weight: 98.4 kg (216 lb 14.9 oz)  Body mass index is 35.01 kg/m².    Intake/Output Summary (Last 24 hours) at 2/18/2023 0835  Last data filed at 2/17/2023 2000  Gross per 24 hour   Intake 670 ml   Output 600 ml   Net 70 ml         Physical Exam  Constitutional:       General: She is not in acute distress.     Appearance: She is well-developed. She is obese. She is ill-appearing (chronically). She is not toxic-appearing.   HENT:      Head: Normocephalic and atraumatic.      Right Ear: External ear normal.      Left Ear: External ear normal.      Nose: Nose normal.      Mouth/Throat:      Mouth: Mucous membranes are moist.      Pharynx: Oropharynx is clear.   Eyes:      General: No scleral icterus.        Right eye: No discharge.         Left eye: No discharge.      Conjunctiva/sclera: Conjunctivae normal.   Cardiovascular:      Rate and Rhythm: Normal rate and regular rhythm.      Pulses:           Radial pulses are 2+ on the right side and 2+ on the left side.      Heart sounds: Normal heart sounds.   Pulmonary:      Effort: Pulmonary effort is normal. No tachypnea or respiratory distress.      Breath sounds: Wheezing (little worse today) present. No rales.   Abdominal:      General: Bowel sounds are decreased. There is no distension.      Palpations: Abdomen is soft.      Tenderness: There is no abdominal tenderness.   Musculoskeletal:         General: Normal range of motion.      Cervical back: Normal range of motion and neck supple.      Right lower leg: Edema present.      Left lower leg: Edema present.      Comments: Trace   Skin:     General: Skin is warm and dry.      Coloration: Skin is pale.   Neurological:      Mental Status: She is alert and oriented to person, place, and time. Mental status is at baseline.      Motor: Motor function is intact.   Psychiatric:         Mood and Affect: Mood normal.         Speech: Speech normal.         Behavior: Behavior normal.       Significant Labs: All pertinent labs within the past 24 hours have been reviewed.    Significant Imaging: I have reviewed all pertinent imaging results/findings within the past 24 hours.

## 2023-02-18 NOTE — PLAN OF CARE
Pt nelson txs with dawit.  Currently on nasal cannula with oxygen saturation within acceptable range.

## 2023-02-18 NOTE — PLAN OF CARE
Problem: Adult Inpatient Plan of Care  Goal: Plan of Care Review  Outcome: Ongoing, Progressing  Goal: Patient-Specific Goal (Individualized)  Outcome: Ongoing, Progressing  Goal: Absence of Hospital-Acquired Illness or Injury  Outcome: Ongoing, Progressing  Goal: Optimal Comfort and Wellbeing  Outcome: Ongoing, Progressing  Goal: Readiness for Transition of Care  Outcome: Ongoing, Progressing     Problem: Fluid and Electrolyte Imbalance (Acute Kidney Injury/Impairment)  Goal: Fluid and Electrolyte Balance  Outcome: Ongoing, Progressing     Pt alert and oriented. Afebrile. Able to change positions in bed independently. Tolerating diet without nausea or vomiting. Fluid restriction as ordered. Oxygen maintained @ 3L nasal cannula. Audible expiratory wheezing noted. Telemetry monitoring continued. External catheter in use. Safety maintained.

## 2023-02-18 NOTE — PROGRESS NOTES
[facility] Medicine  Progress Note    Patient Name: Nalini Varma  MRN: [id number]  Patient Class: IP- Inpatient   Admission Date: 2/13/2023  Length of Stay: 5 days  Attending Physician: Paras Antonio MD  Primary Care Provider: Yane Sahni MD        Subjective:     Principal Problem:Acute on chronic diastolic heart failure        HPI:  The patient is a 72 y.o. female with a past medical history of COPD (on home O2 3L) with 53 pack year smoking hx, current smoker, lung CA, chronic anemia, anxiety, CKD Stage 3, systolic and diastolic heart failure who presents with shortness of breath that started over the last few days.  Also has some diarrhea.  On arrival, oxygen saturations were 50%.  Initially started on nasal cannula with no improvement and was placed on BiPAP.  She also received 1 breathing treatment as well as 0.4 mg of nitroglycerin spray.  On initial workup, the patient's BNP is elevated to 378, some peripheral edema.  She will be admitted for further management of her acute on chronic respiratory failure with hypoxia and acute on chronic systolic heart failure.      Overview/Hospital Course:  No notes on file    Interval History: Patient is awake and alert this morning.  Feels her wheezing is a little worse today.  No chest pain.  No diarrhea.    Review of Systems   Constitutional:  Positive for fatigue (improving). Negative for fever.   HENT:  Negative for congestion, ear pain, rhinorrhea and sinus pressure.    Eyes:  Negative for pain and discharge.   Respiratory:  Positive for shortness of breath (improved from admission, but a little worse today) and wheezing (worse today). Negative for cough and chest tightness.    Cardiovascular:  Positive for leg swelling (trace now). Negative for chest pain and palpitations.   Gastrointestinal:  Negative for abdominal distention, abdominal pain, diarrhea, nausea and vomiting.   Endocrine: Negative for cold intolerance and heat  intolerance.   Genitourinary:  Negative for difficulty urinating, flank pain, frequency, hematuria and urgency.   Musculoskeletal:  Negative for arthralgias, joint swelling and myalgias.   Skin:  Negative for rash.   Allergic/Immunologic: Negative for environmental allergies and food allergies.   Neurological:  Negative for dizziness, weakness, light-headedness and headaches.   Hematological:  Does not bruise/bleed easily.   Psychiatric/Behavioral:  Negative for agitation, behavioral problems and decreased concentration. The patient is nervous/anxious (stable).    Objective:     Vital Signs (Most Recent):  Temp: 97.6 °F (36.4 °C) (02/18/23 0608)  Pulse: 85 (02/18/23 0800)  Resp: 18 (02/18/23 0741)  BP: 133/63 (02/18/23 0608)  SpO2: 97 % (02/18/23 0738)   Vital Signs (24h Range):  Temp:  [97.6 °F (36.4 °C)-98.7 °F (37.1 °C)] 97.6 °F (36.4 °C)  Pulse:  [] 85  Resp:  [14-35] 18  SpO2:  [95 %-100 %] 97 %  BP: (110-150)/(54-67) 133/63     Weight: 98.4 kg (216 lb 14.9 oz)  Body mass index is 35.01 kg/m².    Intake/Output Summary (Last 24 hours) at 2/18/2023 0835  Last data filed at 2/17/2023 2000  Gross per 24 hour   Intake 670 ml   Output 600 ml   Net 70 ml        Physical Exam  Constitutional:       General: She is not in acute distress.     Appearance: She is well-developed. She is obese. She is ill-appearing (chronically). She is not toxic-appearing.   HENT:      Head: Normocephalic and atraumatic.      Right Ear: External ear normal.      Left Ear: External ear normal.      Nose: Nose normal.      Mouth/Throat:      Mouth: Mucous membranes are moist.      Pharynx: Oropharynx is clear.   Eyes:      General: No scleral icterus.        Right eye: No discharge.         Left eye: No discharge.      Conjunctiva/sclera: Conjunctivae normal.   Cardiovascular:      Rate and Rhythm: Normal rate and regular rhythm.      Pulses:           Radial pulses are 2+ on the right side and 2+ on the left side.      Heart sounds:  Normal heart sounds.   Pulmonary:      Effort: Pulmonary effort is normal. No tachypnea or respiratory distress.      Breath sounds: Wheezing (little worse today) present. No rales.   Abdominal:      General: Bowel sounds are decreased. There is no distension.      Palpations: Abdomen is soft.      Tenderness: There is no abdominal tenderness.   Musculoskeletal:         General: Normal range of motion.      Cervical back: Normal range of motion and neck supple.      Right lower leg: Edema present.      Left lower leg: Edema present.      Comments: Trace   Skin:     General: Skin is warm and dry.      Coloration: Skin is pale.   Neurological:      Mental Status: She is alert and oriented to person, place, and time. Mental status is at baseline.      Motor: Motor function is intact.   Psychiatric:         Mood and Affect: Mood normal.         Speech: Speech normal.         Behavior: Behavior normal.       Significant Labs: All pertinent labs within the past 24 hours have been reviewed.    Significant Imaging: I have reviewed all pertinent imaging results/findings within the past 24 hours.      Assessment/Plan:      * Acute on chronic diastolic heart failure  TTE on admission with EF of 60% and Grade III DD  -See above      Acute on chronic respiratory failure with hypoxia and hypercapnia  Patient with COPD on Home O2 at 3L NC, Recurrent Lung Cancer, and HFpEF.  Recently admitted for COPD exacerbation.  Patient presented with shortness of breath that started over the last few days PTA, as well as intermittent diarrhea.  On arrival, oxygen saturations were 50% and placed on BiPAP.  CXR on admission with bibasilar opacities and mild increased interstitial attenuation suggestive for mild pulmonary edema, more pronounced within the lower lung zones.   Labs on admission  and Procal normal .  Patient started on IV Lasix with improvement and now off BiPAP.  Continues to improve and close to baseline.  Changed to po  "Torsemide on 2/17/2023.  Wheezing a little worse since 2/17/2023, so started on burst steroids.  No significant improvement this morning.     Patient seen by Pulmonary - "Improving edema, respiratory support, and symptoms. Patient appears stable for transfer to the floor with no further NIV required as she is back to her baseline home O2 requirements and not in distress. Would continue diuresis as much as possible prior to discharge. Critical care to sign off. Please call for any further concerns".     Plan:  Continue with Torsemide 20mg, but increase to bid   Continue with Prednisone 40mg qday x 5 days - day #2/5.  Continue with Duonebs Q4 standing  Continue with O2 at tolerated to keep SpO2 >92%   Strict intake and output      Chronic obstructive pulmonary disease  COPD with PFTs confirming diagnosis in 2020 on Home O2 3L. Did not appear to be in COPD exacerbation on admission, but more expiratory wheezing on 2/15/2023.  DuoNebs changed to standing and lung exam improved yesterday, but again more wheezing today.  -Continue with Prednisone 40mg q day x 5 days - day #2/5 today  -Continue Standing DuoNebs q4  -Continue triple therapy with LABA/LAMA/ICS  -Continue O2 support for goal 02 88-92%  -Discussed importance of smoking cessation      Diarrhea  Patient recently completed a course of po Vanc for C Diff.  Had recurrent diarrhea at home, but none since admission.   -Monitor          Advance care planning  Followed by Palliative Care.  Patient is Full Code at this time      Recurrent adenocarcinoma of left lung  Per Pulmonary - "Recurrent stage 1 disease with unknown staging as there was not follow up biopsy performed. She was undergoing chemo/rads for this in December but she stopped chemotherapy and radiation in January per patients request. Plans for repeat surveillance CT scan in 1 month"    CKD (chronic kidney disease) stage 3, GFR 30-59 ml/min  Creatinine .8, at baseline    Monitor for acute " decompensation      Severe obesity  Body mass index is 35.01 kg/m². Morbid obesity complicates all aspects of disease management from diagnostic modalities to treatment. Weight loss encouraged and health benefits explained to patient.         Hyperkalemia  K- 5.4    Recheck BMP with AM labs  Lasix    Generalized anxiety disorder  Hx of anxiety, MDD - Chronic. Controlled.  -Continue home meds of Duloxetine 60mg daily    Hyperlipidemia  -Continue Lipitor      Essential hypertension  Normotensive currently  -Continue with Home Metoprolol and Losartan        VTE Risk Mitigation (From admission, onward)         Ordered     heparin (porcine) injection 5,000 Units  Every 8 hours         02/13/23 0447     IP VTE HIGH RISK PATIENT  Once         02/13/23 0447     Place sequential compression device  Until discontinued         02/13/23 0447                Discharge Planning   FELICIANO:      Code Status: Full Code   Is the patient medically ready for discharge?:     Reason for patient still in hospital (select all that apply): Patient trending condition and Treatment  Discharge Plan A: Home with family                  Paras Antonio MD  Department of Hospital Medicine   [facility] ([place])

## 2023-02-19 LAB
ANION GAP SERPL CALC-SCNC: 8 MMOL/L (ref 8–16)
BUN SERPL-MCNC: 21 MG/DL (ref 8–23)
CALCIUM SERPL-MCNC: 8.5 MG/DL (ref 8.7–10.5)
CHLORIDE SERPL-SCNC: 96 MMOL/L (ref 95–110)
CO2 SERPL-SCNC: 31 MMOL/L (ref 23–29)
CREAT SERPL-MCNC: 0.9 MG/DL (ref 0.5–1.4)
EST. GFR  (NO RACE VARIABLE): >60 ML/MIN/1.73 M^2
GLUCOSE SERPL-MCNC: 140 MG/DL (ref 70–110)
POTASSIUM SERPL-SCNC: 3.9 MMOL/L (ref 3.5–5.1)
SODIUM SERPL-SCNC: 135 MMOL/L (ref 136–145)

## 2023-02-19 PROCEDURE — 99900035 HC TECH TIME PER 15 MIN (STAT)

## 2023-02-19 PROCEDURE — 27000221 HC OXYGEN, UP TO 24 HOURS

## 2023-02-19 PROCEDURE — 25000003 PHARM REV CODE 250: Performed by: INTERNAL MEDICINE

## 2023-02-19 PROCEDURE — 99233 PR SUBSEQUENT HOSPITAL CARE,LEVL III: ICD-10-PCS | Mod: ,,, | Performed by: INTERNAL MEDICINE

## 2023-02-19 PROCEDURE — 94640 AIRWAY INHALATION TREATMENT: CPT

## 2023-02-19 PROCEDURE — 99233 SBSQ HOSP IP/OBS HIGH 50: CPT | Mod: ,,, | Performed by: INTERNAL MEDICINE

## 2023-02-19 PROCEDURE — 94761 N-INVAS EAR/PLS OXIMETRY MLT: CPT

## 2023-02-19 PROCEDURE — 25000242 PHARM REV CODE 250 ALT 637 W/ HCPCS: Performed by: INTERNAL MEDICINE

## 2023-02-19 PROCEDURE — 25000003 PHARM REV CODE 250: Performed by: NURSE PRACTITIONER

## 2023-02-19 PROCEDURE — 63600175 PHARM REV CODE 636 W HCPCS: Performed by: INTERNAL MEDICINE

## 2023-02-19 PROCEDURE — 63600175 PHARM REV CODE 636 W HCPCS: Performed by: NURSE PRACTITIONER

## 2023-02-19 PROCEDURE — 80048 BASIC METABOLIC PNL TOTAL CA: CPT | Performed by: NURSE PRACTITIONER

## 2023-02-19 PROCEDURE — 94660 CPAP INITIATION&MGMT: CPT

## 2023-02-19 PROCEDURE — 25000003 PHARM REV CODE 250

## 2023-02-19 PROCEDURE — 63600175 PHARM REV CODE 636 W HCPCS: Performed by: HOSPITALIST

## 2023-02-19 PROCEDURE — 25000003 PHARM REV CODE 250: Performed by: HOSPITALIST

## 2023-02-19 PROCEDURE — 21400001 HC TELEMETRY ROOM

## 2023-02-19 PROCEDURE — 36415 COLL VENOUS BLD VENIPUNCTURE: CPT | Performed by: NURSE PRACTITIONER

## 2023-02-19 RX ADMIN — ATORVASTATIN CALCIUM 40 MG: 20 TABLET, FILM COATED ORAL at 08:02

## 2023-02-19 RX ADMIN — HEPARIN SODIUM 5000 UNITS: 5000 INJECTION INTRAVENOUS; SUBCUTANEOUS at 09:02

## 2023-02-19 RX ADMIN — PANTOPRAZOLE SODIUM 40 MG: 40 TABLET, DELAYED RELEASE ORAL at 08:02

## 2023-02-19 RX ADMIN — FLUTICASONE FUROATE AND VILANTEROL TRIFENATATE 1 PUFF: 100; 25 POWDER RESPIRATORY (INHALATION) at 08:02

## 2023-02-19 RX ADMIN — HEPARIN SODIUM 5000 UNITS: 5000 INJECTION INTRAVENOUS; SUBCUTANEOUS at 06:02

## 2023-02-19 RX ADMIN — IPRATROPIUM BROMIDE AND ALBUTEROL SULFATE 3 ML: 2.5; .5 SOLUTION RESPIRATORY (INHALATION) at 03:02

## 2023-02-19 RX ADMIN — IPRATROPIUM BROMIDE AND ALBUTEROL SULFATE 3 ML: 2.5; .5 SOLUTION RESPIRATORY (INHALATION) at 08:02

## 2023-02-19 RX ADMIN — METOPROLOL SUCCINATE 50 MG: 50 TABLET, EXTENDED RELEASE ORAL at 08:02

## 2023-02-19 RX ADMIN — OXYCODONE HYDROCHLORIDE 5 MG: 5 TABLET ORAL at 08:02

## 2023-02-19 RX ADMIN — TRAZODONE HYDROCHLORIDE 100 MG: 50 TABLET ORAL at 08:02

## 2023-02-19 RX ADMIN — COLLAGENASE SANTYL: 250 OINTMENT TOPICAL at 09:02

## 2023-02-19 RX ADMIN — GABAPENTIN 300 MG: 300 CAPSULE ORAL at 08:02

## 2023-02-19 RX ADMIN — HYDROMORPHONE HYDROCHLORIDE 0.5 MG: 1 INJECTION, SOLUTION INTRAMUSCULAR; INTRAVENOUS; SUBCUTANEOUS at 06:02

## 2023-02-19 RX ADMIN — DILTIAZEM HYDROCHLORIDE 180 MG: 180 CAPSULE, COATED, EXTENDED RELEASE ORAL at 08:02

## 2023-02-19 RX ADMIN — PREDNISONE 40 MG: 20 TABLET ORAL at 08:02

## 2023-02-19 RX ADMIN — IPRATROPIUM BROMIDE AND ALBUTEROL SULFATE 3 ML: 2.5; .5 SOLUTION RESPIRATORY (INHALATION) at 04:02

## 2023-02-19 RX ADMIN — Medication 250 MG: at 08:02

## 2023-02-19 RX ADMIN — HEPARIN SODIUM 5000 UNITS: 5000 INJECTION INTRAVENOUS; SUBCUTANEOUS at 02:02

## 2023-02-19 RX ADMIN — Medication 400 MG: at 08:02

## 2023-02-19 RX ADMIN — LOSARTAN POTASSIUM 100 MG: 50 TABLET, FILM COATED ORAL at 08:02

## 2023-02-19 RX ADMIN — Medication 1000 UNITS: at 08:02

## 2023-02-19 RX ADMIN — THERA TABS 1 TABLET: TAB at 08:02

## 2023-02-19 RX ADMIN — DULOXETINE 60 MG: 30 CAPSULE, DELAYED RELEASE ORAL at 08:02

## 2023-02-19 RX ADMIN — Medication 400 MG: at 09:02

## 2023-02-19 RX ADMIN — GABAPENTIN 300 MG: 300 CAPSULE ORAL at 02:02

## 2023-02-19 RX ADMIN — IPRATROPIUM BROMIDE AND ALBUTEROL SULFATE 3 ML: 2.5; .5 SOLUTION RESPIRATORY (INHALATION) at 12:02

## 2023-02-19 RX ADMIN — TORSEMIDE 20 MG: 20 TABLET ORAL at 09:02

## 2023-02-19 RX ADMIN — IPRATROPIUM BROMIDE AND ALBUTEROL SULFATE 3 ML: 2.5; .5 SOLUTION RESPIRATORY (INHALATION) at 11:02

## 2023-02-19 RX ADMIN — TORSEMIDE 20 MG: 20 TABLET ORAL at 05:02

## 2023-02-19 NOTE — ASSESSMENT & PLAN NOTE
"Patient with COPD on Home O2 at 3L NC, Recurrent Lung Cancer, and HFpEF.  Recently admitted for COPD exacerbation.  Patient presented with shortness of breath that started over the last few days PTA, as well as intermittent diarrhea.  On arrival, oxygen saturations were 50% and placed on BiPAP.  CXR on admission with bibasilar opacities and mild increased interstitial attenuation suggestive for mild pulmonary edema, more pronounced within the lower lung zones.   Labs on admission  and Procal normal .  Patient started on IV Lasix with improvement and now off BiPAP.  Continues to improve and close to baseline.  Changed to po Torsemide on 2/17/2023 and increased to bid.  Wheezing a little worse since 2/17/2023, so started on burst steroids.  Wheezing much improved today.  Creatinine bumped a little, so will reduce Torsemide back down to once a day.  She is stable for discharge today.     Patient seen by Pulmonary - "Improving edema, respiratory support, and symptoms. Patient appears stable for transfer to the floor with no further NIV required as she is back to her baseline home O2 requirements and not in distress. Would continue diuresis as much as possible prior to discharge. Critical care to sign off. Please call for any further concerns".     Plan:  Continue with Torsemide 20mg qday  Continue with Prednisone 40mg qday x 5 days - day #4/5.  Resume home inhalers on discharge  Continue with home O2 at tolerated to keep SpO2 >92% (2-3L)  Strict intake and output      "

## 2023-02-19 NOTE — ASSESSMENT & PLAN NOTE
COPD with PFTs confirming diagnosis in 2020 on Home O2 3L. Did not appear to be in COPD exacerbation on admission, but more expiratory wheezing on 2/15/2023 and started on Prednisone on 2/17/2023.  DuoNebs changed to standing with stable wheezing today.  -Continue with Prednisone 40mg q day x 5 days - day #3/5 today  -Continue Standing DuoNebs q4  -Continue triple therapy with LABA/LAMA/ICS  -Continue O2 support for goal 02 88-92%  -Discussed importance of smoking cessation

## 2023-02-19 NOTE — HOSPITAL COURSE
Patient presented with shortness of breath that started over the last few days PTA, as well as intermittent diarrhea.  On arrival, oxygen saturations were 50% and placed on BiPAP.  CXR on admission with bibasilar opacities and mild increased interstitial attenuation suggestive for mild pulmonary edema, more pronounced within the lower lung zones.   Labs on admission  and Procal normal .  Patient started on IV Lasix with improvement and now off BiPAP.  Continues to improve and close to baseline.  Changed to po Torsemide on 2/17/2023 and increased to bid.  Wheezing a little worse since 2/17/2023, so started on burst steroids.  No significant tachypnea, but still with wheezing today.

## 2023-02-19 NOTE — PLAN OF CARE
POC reviewed with patient. AAOx4.  Purposeful rounding completed. No acute distress. Assessment per flowsheets. VSS on 2L O2 NC. No acute distress. Complaints of pain treated with PRN pain medication according to MAR. No other complaints verbalized during shift. No injuries, falls, or trauma occurred during shift. Bed low and locked with side rails up x 3 and call light within reach. Safety maintained      Problem: Adult Inpatient Plan of Care  Goal: Plan of Care Review  Outcome: Ongoing, Progressing  Goal: Patient-Specific Goal (Individualized)  Outcome: Ongoing, Progressing  Goal: Absence of Hospital-Acquired Illness or Injury  Outcome: Ongoing, Progressing  Goal: Optimal Comfort and Wellbeing  Outcome: Ongoing, Progressing  Goal: Readiness for Transition of Care  Outcome: Ongoing, Progressing     Problem: Infection  Goal: Absence of Infection Signs and Symptoms  Outcome: Ongoing, Progressing

## 2023-02-19 NOTE — ASSESSMENT & PLAN NOTE
"Patient with COPD on Home O2 at 3L NC, Recurrent Lung Cancer, and HFpEF.  Recently admitted for COPD exacerbation.  Patient presented with shortness of breath that started over the last few days PTA, as well as intermittent diarrhea.  On arrival, oxygen saturations were 50% and placed on BiPAP.  CXR on admission with bibasilar opacities and mild increased interstitial attenuation suggestive for mild pulmonary edema, more pronounced within the lower lung zones.   Labs on admission  and Procal normal .  Patient started on IV Lasix with improvement and now off BiPAP.  Continues to improve and close to baseline.  Changed to po Torsemide on 2/17/2023 and increased to bid.  Wheezing a little worse since 2/17/2023, so started on burst steroids.  No significant tachypnea, but still with wheezing today.     Patient seen by Pulmonary - "Improving edema, respiratory support, and symptoms. Patient appears stable for transfer to the floor with no further NIV required as she is back to her baseline home O2 requirements and not in distress. Would continue diuresis as much as possible prior to discharge. Critical care to sign off. Please call for any further concerns".     Plan:  Continue with Torsemide 20mg bid  Continue with Prednisone 40mg qday x 5 days - day #3/5.  Continue with Duonebs Q4 standing  Continue with O2 at tolerated to keep SpO2 >92%   Strict intake and output  Consult Virtual      "

## 2023-02-19 NOTE — PROGRESS NOTES
[facility] Medicine  Progress Note    Patient Name: Nalini Varma  MRN: [id number]  Patient Class: IP- Inpatient   Admission Date: 2/13/2023  Length of Stay: 6 days  Attending Physician: Paras Antonio MD  Primary Care Provider: Yane Sahni MD        Subjective:     Principal Problem:Acute on chronic diastolic heart failure      HPI:  The patient is a 72 y.o. female with a past medical history of COPD (on home O2 3L) with 53 pack year smoking hx, current smoker, lung CA, chronic anemia, anxiety, CKD Stage 3, systolic and diastolic heart failure who presents with shortness of breath that started over the last few days.  Also has some diarrhea.  On arrival, oxygen saturations were 50%.  Initially started on nasal cannula with no improvement and was placed on BiPAP.  She also received 1 breathing treatment as well as 0.4 mg of nitroglycerin spray.  On initial workup, the patient's BNP is elevated to 378, some peripheral edema.  She will be admitted for further management of her acute on chronic respiratory failure with hypoxia and acute on chronic systolic heart failure.      Overview/Hospital Course:  No notes on file    Interval History: Patient is awake and alert this morning.  Overall stable with no adverse events reported overnight.  Still with audible wheezing and nonproductive cough, but breathing overall improved.  No chest pain.  No diarrhea.    Review of Systems   Constitutional:  Negative for fatigue and fever.   HENT:  Negative for congestion, ear pain, rhinorrhea and sinus pressure.    Eyes:  Negative for pain and discharge.   Respiratory:  Positive for shortness of breath (improved) and wheezing (stable). Negative for cough and chest tightness.    Cardiovascular:  Negative for chest pain, palpitations and leg swelling.   Gastrointestinal:  Negative for abdominal distention, abdominal pain, diarrhea, nausea and vomiting.   Endocrine: Negative for cold intolerance and  heat intolerance.   Genitourinary:  Negative for difficulty urinating, flank pain, frequency, hematuria and urgency.   Musculoskeletal:  Negative for arthralgias, joint swelling and myalgias.   Skin:  Negative for rash.   Allergic/Immunologic: Negative for environmental allergies and food allergies.   Neurological:  Negative for dizziness, weakness, light-headedness and headaches.   Hematological:  Does not bruise/bleed easily.   Psychiatric/Behavioral:  Negative for agitation, behavioral problems and decreased concentration. The patient is not nervous/anxious.    Objective:     Vital Signs (Most Recent):  Temp: 96.3 °F (35.7 °C) (02/19/23 0806)  Pulse: 96 (02/19/23 0806)  Resp: 16 (02/19/23 0806)  BP: (!) 151/77 (02/19/23 0806)  SpO2: 97 % (02/19/23 0806)   Vital Signs (24h Range):  Temp:  [96.3 °F (35.7 °C)-98.5 °F (36.9 °C)] 96.3 °F (35.7 °C)  Pulse:  [] 96  Resp:  [16-20] 16  SpO2:  [95 %-99 %] 97 %  BP: (124-193)/(60-84) 151/77     Weight: 98.4 kg (216 lb 14.9 oz)  Body mass index is 35.01 kg/m².    Intake/Output Summary (Last 24 hours) at 2/19/2023 0842  Last data filed at 2/19/2023 0629  Gross per 24 hour   Intake 238 ml   Output 2700 ml   Net -2462 ml        Physical Exam  Constitutional:       General: She is not in acute distress.     Appearance: She is well-developed. She is obese. She is ill-appearing (chronically). She is not toxic-appearing.   HENT:      Head: Normocephalic and atraumatic.      Right Ear: External ear normal.      Left Ear: External ear normal.      Nose: Nose normal.      Mouth/Throat:      Mouth: Mucous membranes are moist.      Pharynx: Oropharynx is clear.   Eyes:      General: No scleral icterus.        Right eye: No discharge.         Left eye: No discharge.      Conjunctiva/sclera: Conjunctivae normal.   Cardiovascular:      Rate and Rhythm: Normal rate and regular rhythm.      Pulses:           Radial pulses are 2+ on the right side and 2+ on the left side.      Heart  sounds: Normal heart sounds.   Pulmonary:      Effort: Pulmonary effort is normal. No tachypnea or respiratory distress.      Breath sounds: Wheezing (stable) present. No rales.   Abdominal:      General: Bowel sounds are decreased. There is no distension.      Palpations: Abdomen is soft.      Tenderness: There is no abdominal tenderness.   Musculoskeletal:         General: Normal range of motion.      Cervical back: Normal range of motion and neck supple.      Right lower leg: Edema present.      Left lower leg: Edema present.      Comments: Trace   Skin:     General: Skin is warm and dry.      Coloration: Skin is pale.   Neurological:      Mental Status: She is alert and oriented to person, place, and time. Mental status is at baseline.      Motor: Motor function is intact.   Psychiatric:         Mood and Affect: Mood normal.         Speech: Speech normal.         Behavior: Behavior normal.       Significant Labs: All pertinent labs within the past 24 hours have been reviewed.    Significant Imaging: I have reviewed all pertinent imaging results/findings within the past 24 hours.      Assessment/Plan:      * Acute on chronic diastolic heart failure  TTE on admission with EF of 60% and Grade III DD  -See above      Acute on chronic respiratory failure with hypoxia and hypercapnia  Patient with COPD on Home O2 at 3L NC, Recurrent Lung Cancer, and HFpEF.  Recently admitted for COPD exacerbation.  Patient presented with shortness of breath that started over the last few days PTA, as well as intermittent diarrhea.  On arrival, oxygen saturations were 50% and placed on BiPAP.  CXR on admission with bibasilar opacities and mild increased interstitial attenuation suggestive for mild pulmonary edema, more pronounced within the lower lung zones.   Labs on admission  and Procal normal .  Patient started on IV Lasix with improvement and now off BiPAP.  Continues to improve and close to baseline.  Changed to po  "Torsemide on 2/17/2023 and increased to bid.  Wheezing a little worse since 2/17/2023, so started on burst steroids.  No significant tachypnea, but still with wheezing today.     Patient seen by Pulmonary - "Improving edema, respiratory support, and symptoms. Patient appears stable for transfer to the floor with no further NIV required as she is back to her baseline home O2 requirements and not in distress. Would continue diuresis as much as possible prior to discharge. Critical care to sign off. Please call for any further concerns".     Plan:  Continue with Torsemide 20mg bid  Continue with Prednisone 40mg qday x 5 days - day #3/5.  Continue with Duonebs Q4 standing  Continue with O2 at tolerated to keep SpO2 >92%   Strict intake and output  Consult Virtual        Chronic obstructive pulmonary disease  COPD with PFTs confirming diagnosis in 2020 on Home O2 3L. Did not appear to be in COPD exacerbation on admission, but more expiratory wheezing on 2/15/2023 and started on Prednisone on 2/17/2023.  DuoNebs changed to standing with stable wheezing today.  -Continue with Prednisone 40mg q day x 5 days - day #3/5 today  -Continue Standing DuoNebs q4  -Continue triple therapy with LABA/LAMA/ICS  -Continue O2 support for goal 02 88-92%  -Discussed importance of smoking cessation      Diarrhea  Patient recently completed a course of po Vanc for C Diff.  Had recurrent diarrhea at home, but none since admission.   -Monitor          Advance care planning  Followed by Palliative Care.  Patient is Full Code at this time      Recurrent adenocarcinoma of left lung  Per Pulmonary - "Recurrent stage 1 disease with unknown staging as there was not follow up biopsy performed. She was undergoing chemo/rads for this in December but she stopped chemotherapy and radiation in January per patients request. Plans for repeat surveillance CT scan in 1 month"    CKD (chronic kidney disease) stage 3, GFR 30-59 ml/min  Creatinine .8, at " baseline    Monitor for acute decompensation      Severe obesity  Body mass index is 35.01 kg/m². Morbid obesity complicates all aspects of disease management from diagnostic modalities to treatment. Weight loss encouraged and health benefits explained to patient.         Hyperkalemia  K- 5.4    Recheck BMP with AM labs  Lasix    Generalized anxiety disorder  Hx of anxiety, MDD - Chronic. Controlled.  -Continue home meds of Duloxetine 60mg daily    Hyperlipidemia  -Continue Lipitor      Essential hypertension  Normotensive currently  -Continue with Home Metoprolol and Losartan        VTE Risk Mitigation (From admission, onward)         Ordered     heparin (porcine) injection 5,000 Units  Every 8 hours         02/13/23 0447     IP VTE HIGH RISK PATIENT  Once         02/13/23 0447     Place sequential compression device  Until discontinued         02/13/23 0447                Discharge Planning   FELICIANO:      Code Status: Full Code   Is the patient medically ready for discharge?:     Reason for patient still in hospital (select all that apply): Patient trending condition, Treatment and Consult recommendations  Discharge Plan A: Home with family                  Paras Antonio MD  Department of Hospital Medicine   [facility] ([place])

## 2023-02-19 NOTE — SUBJECTIVE & OBJECTIVE
Interval History: Patient is awake and alert this morning.  Overall stable with no adverse events reported overnight.  Still with audible wheezing and nonproductive cough, but breathing overall improved.  No chest pain.  No diarrhea.    Review of Systems   Constitutional:  Negative for fatigue and fever.   HENT:  Negative for congestion, ear pain, rhinorrhea and sinus pressure.    Eyes:  Negative for pain and discharge.   Respiratory:  Positive for shortness of breath (improved) and wheezing (stable). Negative for cough and chest tightness.    Cardiovascular:  Negative for chest pain, palpitations and leg swelling.   Gastrointestinal:  Negative for abdominal distention, abdominal pain, diarrhea, nausea and vomiting.   Endocrine: Negative for cold intolerance and heat intolerance.   Genitourinary:  Negative for difficulty urinating, flank pain, frequency, hematuria and urgency.   Musculoskeletal:  Negative for arthralgias, joint swelling and myalgias.   Skin:  Negative for rash.   Allergic/Immunologic: Negative for environmental allergies and food allergies.   Neurological:  Negative for dizziness, weakness, light-headedness and headaches.   Hematological:  Does not bruise/bleed easily.   Psychiatric/Behavioral:  Negative for agitation, behavioral problems and decreased concentration. The patient is not nervous/anxious.    Objective:     Vital Signs (Most Recent):  Temp: 96.3 °F (35.7 °C) (02/19/23 0806)  Pulse: 96 (02/19/23 0806)  Resp: 16 (02/19/23 0806)  BP: (!) 151/77 (02/19/23 0806)  SpO2: 97 % (02/19/23 0806)   Vital Signs (24h Range):  Temp:  [96.3 °F (35.7 °C)-98.5 °F (36.9 °C)] 96.3 °F (35.7 °C)  Pulse:  [] 96  Resp:  [16-20] 16  SpO2:  [95 %-99 %] 97 %  BP: (124-193)/(60-84) 151/77     Weight: 98.4 kg (216 lb 14.9 oz)  Body mass index is 35.01 kg/m².    Intake/Output Summary (Last 24 hours) at 2/19/2023 0853  Last data filed at 2/19/2023 0682  Gross per 24 hour   Intake 238 ml   Output 2700 ml   Net  -2462 ml        Physical Exam  Constitutional:       General: She is not in acute distress.     Appearance: She is well-developed. She is obese. She is ill-appearing (chronically). She is not toxic-appearing.   HENT:      Head: Normocephalic and atraumatic.      Right Ear: External ear normal.      Left Ear: External ear normal.      Nose: Nose normal.      Mouth/Throat:      Mouth: Mucous membranes are moist.      Pharynx: Oropharynx is clear.   Eyes:      General: No scleral icterus.        Right eye: No discharge.         Left eye: No discharge.      Conjunctiva/sclera: Conjunctivae normal.   Cardiovascular:      Rate and Rhythm: Normal rate and regular rhythm.      Pulses:           Radial pulses are 2+ on the right side and 2+ on the left side.      Heart sounds: Normal heart sounds.   Pulmonary:      Effort: Pulmonary effort is normal. No tachypnea or respiratory distress.      Breath sounds: Wheezing (stable) present. No rales.   Abdominal:      General: Bowel sounds are decreased. There is no distension.      Palpations: Abdomen is soft.      Tenderness: There is no abdominal tenderness.   Musculoskeletal:         General: Normal range of motion.      Cervical back: Normal range of motion and neck supple.      Right lower leg: Edema present.      Left lower leg: Edema present.      Comments: Trace   Skin:     General: Skin is warm and dry.      Coloration: Skin is pale.   Neurological:      Mental Status: She is alert and oriented to person, place, and time. Mental status is at baseline.      Motor: Motor function is intact.   Psychiatric:         Mood and Affect: Mood normal.         Speech: Speech normal.         Behavior: Behavior normal.       Significant Labs: All pertinent labs within the past 24 hours have been reviewed.    Significant Imaging: I have reviewed all pertinent imaging results/findings within the past 24 hours.

## 2023-02-20 ENCOUNTER — PATIENT OUTREACH (OUTPATIENT)
Dept: ADMINISTRATIVE | Facility: OTHER | Age: 73
End: 2023-02-20
Payer: MEDICARE

## 2023-02-20 ENCOUNTER — PATIENT MESSAGE (OUTPATIENT)
Dept: INTERNAL MEDICINE | Facility: CLINIC | Age: 73
End: 2023-02-20
Payer: MEDICARE

## 2023-02-20 VITALS
WEIGHT: 195.75 LBS | HEART RATE: 90 BPM | OXYGEN SATURATION: 97 % | DIASTOLIC BLOOD PRESSURE: 60 MMHG | HEIGHT: 66 IN | SYSTOLIC BLOOD PRESSURE: 130 MMHG | RESPIRATION RATE: 20 BRPM | BODY MASS INDEX: 31.46 KG/M2 | TEMPERATURE: 98 F

## 2023-02-20 DIAGNOSIS — R52 PAIN AGGRAVATED BY ACTIVITIES OF DAILY LIVING: ICD-10-CM

## 2023-02-20 DIAGNOSIS — M17.0 PRIMARY OSTEOARTHRITIS OF BOTH KNEES: ICD-10-CM

## 2023-02-20 DIAGNOSIS — M81.8 OTHER OSTEOPOROSIS, UNSPECIFIED PATHOLOGICAL FRACTURE PRESENCE: ICD-10-CM

## 2023-02-20 DIAGNOSIS — G89.4 CHRONIC PAIN SYNDROME: ICD-10-CM

## 2023-02-20 DIAGNOSIS — S32.030D CLOSED COMPRESSION FRACTURE OF L3 LUMBAR VERTEBRA WITH ROUTINE HEALING, SUBSEQUENT ENCOUNTER: ICD-10-CM

## 2023-02-20 LAB
ANION GAP SERPL CALC-SCNC: 9 MMOL/L (ref 8–16)
BUN SERPL-MCNC: 26 MG/DL (ref 8–23)
CALCIUM SERPL-MCNC: 8.7 MG/DL (ref 8.7–10.5)
CHLORIDE SERPL-SCNC: 94 MMOL/L (ref 95–110)
CO2 SERPL-SCNC: 32 MMOL/L (ref 23–29)
CREAT SERPL-MCNC: 1.1 MG/DL (ref 0.5–1.4)
EST. GFR  (NO RACE VARIABLE): 53 ML/MIN/1.73 M^2
GLUCOSE SERPL-MCNC: 136 MG/DL (ref 70–110)
POTASSIUM SERPL-SCNC: 4.2 MMOL/L (ref 3.5–5.1)
SODIUM SERPL-SCNC: 135 MMOL/L (ref 136–145)

## 2023-02-20 PROCEDURE — 63600175 PHARM REV CODE 636 W HCPCS: Performed by: HOSPITALIST

## 2023-02-20 PROCEDURE — 25000003 PHARM REV CODE 250: Performed by: INTERNAL MEDICINE

## 2023-02-20 PROCEDURE — 94761 N-INVAS EAR/PLS OXIMETRY MLT: CPT

## 2023-02-20 PROCEDURE — 25000242 PHARM REV CODE 250 ALT 637 W/ HCPCS: Performed by: INTERNAL MEDICINE

## 2023-02-20 PROCEDURE — 27000221 HC OXYGEN, UP TO 24 HOURS

## 2023-02-20 PROCEDURE — 99900035 HC TECH TIME PER 15 MIN (STAT)

## 2023-02-20 PROCEDURE — 25000003 PHARM REV CODE 250: Performed by: HOSPITALIST

## 2023-02-20 PROCEDURE — 99239 PR HOSPITAL DISCHARGE DAY,>30 MIN: ICD-10-PCS | Mod: ,,, | Performed by: INTERNAL MEDICINE

## 2023-02-20 PROCEDURE — 94660 CPAP INITIATION&MGMT: CPT

## 2023-02-20 PROCEDURE — 80048 BASIC METABOLIC PNL TOTAL CA: CPT | Performed by: NURSE PRACTITIONER

## 2023-02-20 PROCEDURE — 94640 AIRWAY INHALATION TREATMENT: CPT

## 2023-02-20 PROCEDURE — 36415 COLL VENOUS BLD VENIPUNCTURE: CPT | Performed by: NURSE PRACTITIONER

## 2023-02-20 PROCEDURE — 99239 HOSP IP/OBS DSCHRG MGMT >30: CPT | Mod: ,,, | Performed by: INTERNAL MEDICINE

## 2023-02-20 PROCEDURE — 63600175 PHARM REV CODE 636 W HCPCS: Performed by: NURSE PRACTITIONER

## 2023-02-20 PROCEDURE — 25000003 PHARM REV CODE 250: Performed by: NURSE PRACTITIONER

## 2023-02-20 PROCEDURE — 63600175 PHARM REV CODE 636 W HCPCS: Performed by: INTERNAL MEDICINE

## 2023-02-20 RX ORDER — PREDNISONE 20 MG/1
40 TABLET ORAL DAILY
Qty: 2 TABLET | Refills: 0 | Status: SHIPPED | OUTPATIENT
Start: 2023-02-21 | End: 2023-02-22

## 2023-02-20 RX ORDER — DENOSUMAB 60 MG/ML
60 INJECTION SUBCUTANEOUS
Qty: 2 ML | Refills: 0 | Status: ON HOLD | OUTPATIENT
Start: 2023-02-20 | End: 2023-06-15

## 2023-02-20 RX ORDER — DILTIAZEM HYDROCHLORIDE 180 MG/1
180 CAPSULE, COATED, EXTENDED RELEASE ORAL DAILY
Qty: 30 CAPSULE | Refills: 1 | Status: SHIPPED | OUTPATIENT
Start: 2023-02-21 | End: 2023-04-19 | Stop reason: SDUPTHER

## 2023-02-20 RX ORDER — HYDROCODONE BITARTRATE AND ACETAMINOPHEN 10; 325 MG/1; MG/1
1 TABLET ORAL EVERY 12 HOURS PRN
Qty: 60 TABLET | Refills: 0 | Status: SHIPPED | OUTPATIENT
Start: 2023-02-20 | End: 2023-03-20 | Stop reason: SDUPTHER

## 2023-02-20 RX ORDER — METOPROLOL SUCCINATE 25 MG/1
50 TABLET, EXTENDED RELEASE ORAL DAILY
Qty: 60 TABLET | Refills: 1 | Status: SHIPPED | OUTPATIENT
Start: 2023-02-20 | End: 2023-04-19 | Stop reason: SDUPTHER

## 2023-02-20 RX ADMIN — TORSEMIDE 20 MG: 20 TABLET ORAL at 09:02

## 2023-02-20 RX ADMIN — ONDANSETRON 8 MG: 2 INJECTION INTRAMUSCULAR; INTRAVENOUS at 05:02

## 2023-02-20 RX ADMIN — OXYCODONE HYDROCHLORIDE 5 MG: 5 TABLET ORAL at 05:02

## 2023-02-20 RX ADMIN — LOSARTAN POTASSIUM 100 MG: 50 TABLET, FILM COATED ORAL at 09:02

## 2023-02-20 RX ADMIN — FLUTICASONE FUROATE AND VILANTEROL TRIFENATATE 1 PUFF: 100; 25 POWDER RESPIRATORY (INHALATION) at 07:02

## 2023-02-20 RX ADMIN — HEPARIN SODIUM 5000 UNITS: 5000 INJECTION INTRAVENOUS; SUBCUTANEOUS at 05:02

## 2023-02-20 RX ADMIN — PREDNISONE 40 MG: 20 TABLET ORAL at 09:02

## 2023-02-20 RX ADMIN — Medication 250 MG: at 09:02

## 2023-02-20 RX ADMIN — ATORVASTATIN CALCIUM 40 MG: 20 TABLET, FILM COATED ORAL at 09:02

## 2023-02-20 RX ADMIN — IPRATROPIUM BROMIDE AND ALBUTEROL SULFATE 3 ML: 2.5; .5 SOLUTION RESPIRATORY (INHALATION) at 03:02

## 2023-02-20 RX ADMIN — GABAPENTIN 300 MG: 300 CAPSULE ORAL at 09:02

## 2023-02-20 RX ADMIN — PANTOPRAZOLE SODIUM 40 MG: 40 TABLET, DELAYED RELEASE ORAL at 09:02

## 2023-02-20 RX ADMIN — DULOXETINE 60 MG: 30 CAPSULE, DELAYED RELEASE ORAL at 09:02

## 2023-02-20 RX ADMIN — COLLAGENASE SANTYL: 250 OINTMENT TOPICAL at 09:02

## 2023-02-20 RX ADMIN — IPRATROPIUM BROMIDE AND ALBUTEROL SULFATE 3 ML: 2.5; .5 SOLUTION RESPIRATORY (INHALATION) at 07:02

## 2023-02-20 RX ADMIN — DILTIAZEM HYDROCHLORIDE 180 MG: 180 CAPSULE, COATED, EXTENDED RELEASE ORAL at 09:02

## 2023-02-20 RX ADMIN — Medication 1000 UNITS: at 09:02

## 2023-02-20 RX ADMIN — METOPROLOL SUCCINATE 50 MG: 50 TABLET, EXTENDED RELEASE ORAL at 09:02

## 2023-02-20 RX ADMIN — IPRATROPIUM BROMIDE AND ALBUTEROL SULFATE 3 ML: 2.5; .5 SOLUTION RESPIRATORY (INHALATION) at 11:02

## 2023-02-20 RX ADMIN — Medication 400 MG: at 09:02

## 2023-02-20 RX ADMIN — THERA TABS 1 TABLET: TAB at 09:02

## 2023-02-20 NOTE — PLAN OF CARE
Pt requested WC van transport home.     van came for pt but refused to transport due to pt request to be taken inside her apt to her own WC.     CM notified pt flow center to change to ambulance transport.    PHN auth pending. CM to notify [facility] when auth provided.

## 2023-02-20 NOTE — ASSESSMENT & PLAN NOTE
Normotensive currently  -Continue with Home Metoprolol and Losartan  -Continue with Diltiazem CD started by Cardiology

## 2023-02-20 NOTE — TELEPHONE ENCOUNTER
No new care gaps identified.  [facility] Embedded Care Gaps. Reference number: [id number]. 2/20/2023   10:12:11 AM CST

## 2023-02-20 NOTE — DISCHARGE INSTRUCTIONS
For your Congestive Heart Failure and Hypertension, please continue the following medications on discharge:  Torsemide 20mg once a day (same dose)  Metoprolol XL 50mg once a day (same dose)  Losartan 100mg once a day (same dose)  Diltiazem CD 180mg once a day (new medication)    For you COPD, you will need one more day of Prednisone 40mg once a day starting with tomorrow's dose.  Please refrain from smoking.  Continue with your home oxygen use and continue with your inhalers as previous.    Continue with a low salt diet and fluid restriction of 1500ml per day.  Your Home Health care will be resumed on discharge.  Please follow up with your Primary Care Provider soon.

## 2023-02-20 NOTE — ASSESSMENT & PLAN NOTE
COPD with PFTs confirming diagnosis in 2020 on Home O2 3L. Did not appear to be in COPD exacerbation on admission, but more expiratory wheezing on 2/15/2023 and started on Prednisone on 2/17/2023.  DuoNebs changed to standing with stable wheezing today.  -Continue with Prednisone 40mg q day x 5 days - day #4/5 today  -Continue triple therapy with LABA/LAMA/ICS on discharge  -Discussed importance of smoking cessation

## 2023-02-20 NOTE — DISCHARGE SUMMARY
[facility]  Discharge Summary      Patient Name: Nalini Varma  MRN: [id number]  JOHNNY: [id number]  Patient Class: IP- Inpatient  Admission Date: 2/13/2023  Hospital Length of Stay: 7 days  Discharge Date and Time:  02/20/2023 9:38 AM  Attending Physician: Paras Antonio MD   Discharging Provider: Paras Antonio MD  Primary Care Provider: Yane Sahni MD    Primary Care Team: Networked reference to record PCT     HPI:   The patient is a 72 y.o. female with a past medical history of COPD (on home O2 3L) with 53 pack year smoking hx, current smoker, lung CA, chronic anemia, anxiety, CKD Stage 3, systolic and diastolic heart failure who presents with shortness of breath that started over the last few days.  Also has some diarrhea.  On arrival, oxygen saturations were 50%.  Initially started on nasal cannula with no improvement and was placed on BiPAP.  She also received 1 breathing treatment as well as 0.4 mg of nitroglycerin spray.  On initial workup, the patient's BNP is elevated to 378, some peripheral edema.  She will be admitted for further management of her acute on chronic respiratory failure with hypoxia and acute on chronic systolic heart failure.    Hospital Course:   Patient presented with shortness of breath that started over the last few days PTA, as well as intermittent diarrhea.  On arrival, oxygen saturations were 50% and placed on BiPAP.  CXR on admission with bibasilar opacities and mild increased interstitial attenuation suggestive for mild pulmonary edema, more pronounced within the lower lung zones.   Labs on admission  and Procal normal .  Patient started on IV Lasix with improvement and now off BiPAP.  Continues to improve and close to baseline.  Changed to po Torsemide on 2/17/2023 and increased to bid.  Wheezing a little worse since 2/17/2023, so started on burst steroids.  No significant tachypnea, but still with wheezing today.       Goals  of Care Treatment Preferences:  Code Status: Full Code          What is most important right now is to focus on remaining as independent as possible, improvement in condition but with limits to invasive therapies.  Accordingly, we have decided that the best plan to meet the patient's goals includes continuing with treatment.      Consults:   Consults (From admission, onward)        Status Ordering Provider     Inpatient consult to Palliative Care  Once        Provider:  Lucho Gutierrez MD    Completed MARCO ANTONIO, SHANICE.     Inpatient consult to Cardiology  Once        Provider:  Willard Roberts MD    Completed MARCO ANTONIO SHANICELissa     Inpatient consult to Pulmonary Critical Care  Once        Provider:  Justin Ellerman, MD    Completed NIKKO BERNARD F.     Inpatient consult to Social Work/Case Management  Once        Provider:  (Not yet assigned)    Completed ABEBE ORDAZ     Inpatient consult to Registered Dietitian/Nutritionist  Once        Provider:  (Not yet assigned)    Completed ABEBE ORDAZ          Pulmonary  Chronic obstructive pulmonary disease  COPD with PFTs confirming diagnosis in 2020 on Home O2 3L. Did not appear to be in COPD exacerbation on admission, but more expiratory wheezing on 2/15/2023 and started on Prednisone on 2/17/2023.  DuoNebs changed to standing with stable wheezing today.  -Continue with Prednisone 40mg q day x 5 days - day #4/5 today  -Continue triple therapy with LABA/LAMA/ICS on discharge  -Discussed importance of smoking cessation      Acute on chronic respiratory failure with hypoxia and hypercapnia  Patient with COPD on Home O2 at 3L NC, Recurrent Lung Cancer, and HFpEF.  Recently admitted for COPD exacerbation.  Patient presented with shortness of breath that started over the last few days PTA, as well as intermittent diarrhea.  On arrival, oxygen saturations were 50% and placed on BiPAP.  CXR on admission with bibasilar opacities and mild increased interstitial  "attenuation suggestive for mild pulmonary edema, more pronounced within the lower lung zones.   Labs on admission  and Procal normal .  Patient started on IV Lasix with improvement and now off BiPAP.  Continues to improve and close to baseline.  Changed to po Torsemide on 2/17/2023 and increased to bid.  Wheezing a little worse since 2/17/2023, so started on burst steroids.  Wheezing much improved today.  Creatinine bumped a little, so will reduce Torsemide back down to once a day.  She is stable for discharge today.     Patient seen by Pulmonary - "Improving edema, respiratory support, and symptoms. Patient appears stable for transfer to the floor with no further NIV required as she is back to her baseline home O2 requirements and not in distress. Would continue diuresis as much as possible prior to discharge. Critical care to sign off. Please call for any further concerns".     Plan:  Continue with Torsemide 20mg qday  Continue with Prednisone 40mg qday x 5 days - day #4/5.  Resume home inhalers on discharge  Continue with home O2 at tolerated to keep SpO2 >92% (2-3L)  Strict intake and output        Cardiac/Vascular  * Acute on chronic diastolic heart failure  TTE on admission with EF of 60% and Grade III DD  -See above      Hyperlipidemia  -Continue Lipitor      Essential hypertension  Normotensive currently  -Continue with Home Metoprolol and Losartan  -Continue with Diltiazem CD started by Cardiology      Renal/  CKD (chronic kidney disease) stage 3, GFR 30-59 ml/min  Creatinine .8, at baseline    Monitor for acute decompensation      Hyperkalemia  K- 5.4    Recheck BMP with AM labs  Lasix    Oncology  Recurrent adenocarcinoma of left lung  Per Pulmonary - "Recurrent stage 1 disease with unknown staging as there was not follow up biopsy performed. She was undergoing chemo/rads for this in December but she stopped chemotherapy and radiation in January per patients request. Plans for repeat surveillance " "CT scan in 1 month"    Endocrine  Severe obesity  Body mass index is 31.6 kg/m². Morbid obesity complicates all aspects of disease management from diagnostic modalities to treatment. Weight loss encouraged and health benefits explained to patient.         GI  Diarrhea  Patient recently completed a course of po Vanc for C Diff.  Had recurrent diarrhea at home, but none since admission.   -Monitor          Other  Advance care planning  Followed by Palliative Care.  Patient is Full Code at this time      Generalized anxiety disorder  Hx of anxiety, MDD - Chronic. Controlled.  -Continue home meds of Duloxetine 60mg daily      Final Active Diagnoses:    Diagnosis Date Noted POA    PRINCIPAL PROBLEM:  Acute on chronic diastolic heart failure [I50.33] 09/17/2018 Yes    Acute on chronic respiratory failure with hypoxia and hypercapnia [J96.21, J96.22] 05/13/2021 Yes    Chronic obstructive pulmonary disease [J44.9] 03/16/2016 Yes    Diarrhea [R19.7] 02/13/2023 Yes    Advance care planning [Z71.89] 12/23/2022 Not Applicable    Recurrent adenocarcinoma of left lung [C34.92] 10/28/2022 Yes    Severe obesity [E66.01] 04/29/2022 Yes    Generalized anxiety disorder [F41.1] 11/14/2016 Yes    Hyperlipidemia [E78.5] 09/20/2016 Yes    Essential hypertension [I10] 03/16/2016 Yes      Problems Resolved During this Admission:       Discharged Condition: fair    Disposition: Home-Health Care [facility]    Follow Up:   Follow-up Information     Yane Sahni MD Follow up.    Specialty: Internal Medicine  Contact information:  [address] [place] [address] [id number] [phone]                       Patient Instructions:      COMMODE FOR HOME USE     Order Specific Question Answer Comments   Type: Standard    Height: 5' 6" (1.676 m)    Weight: 95.1 kg (209 lb 10.5 oz)    Does patient have medical equipment at home? walker, standard    Does patient have medical equipment at home? wheelchair    Does patient have medical equipment " at home? hospital bed    Does patient have medical equipment at home? rollator    Length of need (1-99 months): 99      Diet Cardiac     Notify your health care provider if you experience any of the following:  temperature >100.4     Notify your health care provider if you experience any of the following:  difficulty breathing or increased cough     Notify your health care provider if you experience any of the following:  increased confusion or weakness     Activity as tolerated       Significant Diagnostic Studies: Labs:   BMP:   Recent Labs   Lab 02/19/23  0438 02/20/23  0405   * 136*   * 135*   K 3.9 4.2   CL 96 94*   CO2 31* 32*   BUN 21 26*   CREATININE 0.9 1.1   CALCIUM 8.5* 8.7    and CBC No results for input(s): WBC, HGB, HCT, PLT in the last 48 hours.    Pending Diagnostic Studies:     None         Medications:  Reconciled Home Medications:      Medication List      START taking these medications    diltiaZEM 180 MG 24 hr capsule  Commonly known as: CARDIZEM CD  Take 1 capsule (180 mg total) by mouth once daily.  Start taking on: February 21, 2023     predniSONE 20 MG tablet  Commonly known as: DELTASONE  Take 2 tablets (40 mg total) by mouth once daily. for 1 day  Start taking on: February 21, 2023        CONTINUE taking these medications    ADVAIR -21 mcg/actuation Hfaa inhaler  Generic drug: fluticasone propion-salmeterol 115-21 mcg/dose  Inhale 2 puffs into the lungs every 12 (twelve) hours.     albuterol 90 mcg/actuation inhaler  Commonly known as: VENTOLIN HFA  inhale 1-2 puffs as needed every 6 hours for wheezing and shortness of breath     ALPRAZolam 0.25 MG tablet  Commonly known as: XANAX  Take 1 tablet (0.25 mg total) by mouth daily as needed for Anxiety.     ascorbic acid (vitamin C) 250 MG tablet  Commonly known as: VITAMIN C  Take 250 mg by mouth once daily.     atorvastatin 40 MG tablet  Commonly known as: LIPITOR  TAKE 1 TABLET BY MOUTH EVERY DAILY     azelastine 137 mcg  (0.1 %) nasal spray  Commonly known as: ASTELIN  Instill 1 spray (137 mcg total) by Nasal route 2 (two) times daily.     b complex vitamins capsule  Take 1 capsule by mouth once daily.     biotin 10 mg Tab  Take 10 mg by mouth once daily.     CENTRUM SILVER WOMEN ORAL  Take 1 tablet by mouth once daily.     cyanocobalamin (vitamin B-12) 1,000 mcg Tbsr  Take 1,000 mcg by mouth once daily.     DULoxetine 60 MG capsule  Commonly known as: CYMBALTA  Take 1 capsule (60 mg total) by mouth once daily.     fluticasone propionate 50 mcg/actuation nasal spray  Commonly known as: FLONASE  1 spray by Each Nare route 2 (two) times daily as needed for Rhinitis.     gabapentin 300 MG capsule  Commonly known as: NEURONTIN  Take 1 capsule (300 mg total) by mouth 3 (three) times daily.     guaiFENesin 600 mg 12 hr tablet  Commonly known as: MUCINEX  Take 1,200 mg by mouth 2 (two) times daily as needed for Congestion.     HYDROcodone-acetaminophen  mg per tablet  Commonly known as: NORCO  Take 1 tablet by mouth every 12 (twelve) hours as needed for Pain.     INCRUSE ELLIPTA 62.5 mcg/actuation inhalation capsule  Generic drug: umeclidinium  Inhale 1 puff into the lungs once daily. Controller     losartan 100 MG tablet  Commonly known as: COZAAR  Take 1 tablet (100 mg total) by mouth once daily. HOLD UNTIL YOU SEE YOUR PCP     lutein 40 mg Cap  Take by mouth.     magnesium oxide 400 mg (241.3 mg magnesium) tablet  Commonly known as: MAG-OX  Take 1 tablet by mouth every 12 (twelve) hours.     metoprolol succinate 25 MG 24 hr tablet  Commonly known as: TOPROL-XL  Take 2 tablets (50 mg total) by mouth once daily.     ondansetron 8 MG Tbdl  Commonly known as: ZOFRAN-ODT  dissolve 1 tablet (8 mg total) by mouth every 8 (eight) hours as needed (nausea).     OYSTER SHELL CALCIUM 500 500 mg calcium (1,250 mg) tablet  Generic drug: calcium carbonate  Take 2 tablets (1,000 mg total) by mouth 2 (two) times daily.     pantoprazole 40 MG  tablet  Commonly known as: PROTONIX  Take 1 tablet (40 mg total) by mouth once daily.     PROLIA 60 mg/mL Syrg  Generic drug: denosumab  Inject 1 mL (60 mg total) into the skin every 6 (six) months.     promethazine 25 MG tablet  Commonly known as: PHENERGAN  Take 1 tablet (25 mg total) by mouth every 6 (six) hours as needed for Nausea.     torsemide 20 MG Tab  Commonly known as: DEMADEX  Take 1 tablet (20 mg total) by mouth once daily.     traZODone 100 MG tablet  Commonly known as: DESYREL  Take 1 tablet (100 mg total) by mouth every evening.     vitamin D 1000 units Tab  Commonly known as: VITAMIN D3  Take 1 tablet (1,000 Units total) by mouth once daily.     ZINC ORAL  Take by mouth.            Indwelling Lines/Drains at time of discharge:   Lines/Drains/Airways     Drain  Duration           Female External Urinary Catheter 02/13/23 0540 7 days                Time spent on the discharge of patient: 35 minutes         Paras Antonio MD  Department of Hospital Medicine  [facility] ([place])

## 2023-02-20 NOTE — TELEPHONE ENCOUNTER
Requested medication has been pended and routed to Dr. Sahni  for approval. Allergies have been verified. Thanks     Refill Encounter    PCP Visits: Recent Visits  Date Type Provider Dept   02/02/23 Office Visit Yane Sahni MD [facility] Internal Medicine   09/21/22 Office Visit Yane Sahni MD [facility] Internal Medicine   04/29/22 Office Visit Yane Sahni MD [facility] Internal Medicine   03/18/22 Office Visit Yane Sahni MD [facility] Internal Medicine   Showing recent visits within past 360 days and meeting all other requirements  Future Appointments  Date Type Provider Dept   03/09/23 Appointment Yane Sahni MD [facility] Internal Medicine   03/31/23 Appointment Yane Sahni MD [facility] Internal Medicine   Showing future appointments within next 720 days and meeting all other requirements     Last 3 Blood Pressure:   BP Readings from Last 3 Encounters:   02/20/23 121/84   02/02/23 130/80   01/24/23 (!) 178/99     Preferred Pharmacy:   [facility]  [address]  [facility] [address]  Phone: [phone] Fax: [phone]    [facility]/pharmacy #[id number] [place] [address] [facility] [address]  Phone: [phone] Fax: [phone]    [facility] [address] [address]  Phone: [phone] Fax: [phone]    Requested RX:  Requested Prescriptions     Pending Prescriptions Disp Refills    denosumab (PROLIA) 60 mg/mL Syrg 2 mL 0     Sig: Inject 1 mL (60 mg total) into the skin every 6 (six) months.      RX Route: Normal

## 2023-02-20 NOTE — PLAN OF CARE
POC reviewed with patient. AAOx4.  Purposeful rounding completed. No acute distress. Assessment per flowsheets. VSS on 2L O2 NC. No acute distress. Complaints of pain treated with PRN pain medication according to MAR. No other complaints verbalized during shift. No injuries, falls, or trauma occurred during shift. Bed low and locked with side rails up x 3 and call light within reach. Safety maintained      Problem: Adult Inpatient Plan of Care  Goal: Plan of Care Review  Outcome: Ongoing, Progressing  Goal: Patient-Specific Goal (Individualized)  Outcome: Ongoing, Progressing  Goal: Absence of Hospital-Acquired Illness or Injury  Outcome: Ongoing, Progressing  Goal: Optimal Comfort and Wellbeing  Outcome: Ongoing, Progressing  Goal: Readiness for Transition of Care  Outcome: Ongoing, Progressing

## 2023-02-20 NOTE — ASSESSMENT & PLAN NOTE
Body mass index is 31.6 kg/m². Morbid obesity complicates all aspects of disease management from diagnostic modalities to treatment. Weight loss encouraged and health benefits explained to patient.

## 2023-02-20 NOTE — PT/OT/SLP PROGRESS
Physical Therapy      Patient Name:  Nalini Varma   MRN:  [id number]    Patient not seen today secondary to patient already discharged from hospital  .

## 2023-02-20 NOTE — PROGRESS NOTES
IP Liaison - Initial Visit Note    Patient: Nalini Varma  MRN:  [id number]  Date of Service:  2/20/2023  Completed by:  DENILSON Reyes    Reason for Visit   Patient presents with    IP Liaison Initial Visit       RSW met with patient at bedside in order to complete SDOH questionnaire and liaison assessment.  Pt has identified no barriers to care.  RSW will mail housing and food resources to patient per request.  The following were addressed during this visit:  - Review SDOH Questions   - Complete patient assessment   - Review and discuss program options related to housing needs   - Review and discuss options to address the patient's transportation needs   - Review and discuss options to become more physically active   - Review and discuss options to address food insecurity   - Complete initial visit with patient        Patient Summary     IP Liaison Patient Assessment    General  Level of Caregiver support: Member independent and does not need caregiver assistance  Have you had to make a decision between paying for any of the following in the last 2 months?: None  Transportation means: Family  Employment status: Retired and not working  Current symptoms: Sleep disturbances  Assessments  Was the PHQ Depression Screening completed this visit?: Yes  Was the MARY-7 Screening completed this visit?: No       IP Liaison - Final Visit Note    Patient: Nalini Varma  MRN:  [id number]  Date of Service:  2/20/2023  Completed by:  DENILSON Reyes    Reason for Visit   Patient presents with    IP Liaison Initial Visit       The following were addressed during this visit:  - Review SDOH Questions   - Complete patient assessment   - Review and discuss program options related to housing needs   - Review and discuss options to address the patient's transportation needs   - Review and discuss options to become more physically active   - Review and discuss options to address food insecurity   - Complete initial visit with  patient        Patient Summary     Discharge Date: 2/20/2023  Discharge telephone number/address: [phone] [address]  Follow up provider: Yane Sahni MD  Follow up appointments: 3/9/2023, 12:00pm  Home Health agency & telephone number: [facility] /[phone]  DME ordered &  name: n/a  Assigned OPCM RN/SW: n/a  Report sent to follow up team (PCP/OPCM) via in basket message: n/a  Community Resources arranged including agency name & contact info: housing and food resources    DENILSON Reyes

## 2023-02-20 NOTE — PLAN OF CARE
[facility]  Home Health Hub: [phone]    GENERAL HEART FAILURE  HOME HEALTH ORDERS    Patient Name: Nalini Varma  YOB: 1950    PCP: Yane Sahni MD   PCP Address: [address] [place] [address] [place] [address]  PCP Phone Number: [phone]  PCP Fax: [phone]    Admit to Home Health    Diagnosis:   Active Hospital Problems    Diagnosis  POA    *Acute on chronic diastolic heart failure [I50.33]  Yes     Priority: 3     Acute on chronic respiratory failure with hypoxia and hypercapnia [J96.21, J96.22]  Yes     Priority: 1 - High    Chronic obstructive pulmonary disease [J44.9]  Yes     Priority: 2     Diarrhea [R19.7]  Yes     Priority: 4     Advance care planning [Z71.89]  Not Applicable    Recurrent adenocarcinoma of left lung [C34.92]  Yes    Severe obesity [E66.01]  Yes    Generalized anxiety disorder [F41.1]  Yes    Hyperlipidemia [E78.5]  Yes    Essential hypertension [I10]  Yes      Resolved Hospital Problems   No resolved problems to display.       Patient is homebound due to:  Acute on chronic diastolic heart failure    Allergies:   Review of patient's allergies indicates:   Allergen Reactions    Wellbutrin [bupropion hcl] Other (See Comments)     Hyponatremia and hypomagnesia     Zoloft [sertraline] Other (See Comments)     Hyponatremia and hypomagnesia     Bananas [banana]      Emesis, and stomach cramps    Patient states she is not allergic to Banana       Diet: cardiac diet Low salt diet (2g) and Fluid restriction (1500ml/day)    Activities as tolerated: SN to instruct patient on importance of home exercise program. Utilize tool for education.    Nursing:   Evaluate fall risk and need for Physical Therapy by performing time up and go (TUG) and 30 second chair rise.  If the patient scores at risk on any one of the two tests performed then SN to order PT evaluate and treat.   If PT consulted based on at risk score, then PT to evaluate need for OT in the  home on evaluation visit.     SN to complete comprehensive assessment within 24 hours of discharge from hospital or referral from ambulatory clinic:    Perform and record the following vital signs:  BP  Respiratory Rate  Pulse  O2 Sat    Weight (deliver scale to patient on admit if patient does not have properly functioning scale)  Skilled Nurse to record Target Weight = AM weight immediately after discharge unless otherwise specified  Standing Daily Weight:  Instruct patient to take daily AM weights (record on log provided)  Protocol for how to weigh patient:   Nurse to hold all equipment  Shoes off  Early morning with enforcement to patient on timing and method      If weight gain exceeds 5 lbs over target weight, call MD    Skilled nurse to perform medication reconciliation on admit visit comparing discharged medications with actual medications in the home to include OTCs.  SN to provide a weekly medication reminder (give filled pill box) if warranted and instruct patient on its use.  If a medication discrepancy exists please contact patient's PCP.  Skilled nurse to assess all home equipment such as oxygen, nebulizers, hospital beds etc to determine if the equipment is functioning properly and patient/caregiver understanding how to use properly.    If equipment is not functioning properly please contact supplier/ to resolve.    On admission visit, skilled nurse to instruct patient on signs and symptoms of HF by educating patient and/or caregiver on Heart Failure Zone Stoplight tool.  SN to instruct patient caregiver on who to contact if symptoms develop.  SN to leave behind tool with contact numbers as developed by [facility].    Skilled nurse to verify patient follow-up appointments have been scheduled and patient/caregiver understands the importance of making all appointments. Determine if any transportation related issues exist. SW consult if needed to assist with coordination of transportation to and  from MD offices.    SN to reinforce education as instructed on admit visit and continue education on the below items on subsequent visits:  Instruct on the definition of CHF.  (See definition in [facility] educational packet)  Instruct on the signs/symptoms of CHF to be reported.  Instruct on factors that cause exacerbation.  Instruct on action, dose, schedule, and side effects of medications.  Risk factors for HF  Smoking cessation if patient is a smoker  Triggers of SOB  Coping mechanisms  Importance of diet/fluid restrictions, understanding food labels  Fall prevention and home safety  Plan on self management after HH discharge    CONSULTS:    Physical Therapy to evaluate and treat. Evaluate for home safety and equipment needs; Establish/upgrade home exercise program. Perform / instruct on therapeutic exercises, gait training, transfer training, and Range of Motion.  Occupational Therapy to evaluate and treat. Evaluate home environment for safety and equipment needs. Perform/Instruct on transfers, ADL training, ROM, and therapeutic exercises.    MISCELLANEOUS CARE  Routine Skin for Bedridden Patients: Instruct patient/caregiver to apply moisture barrier cream to all skin folds and wet areas in perineal area daily and after baths and all bowel movements.    WOUND CARE ORDERS  no    Medications: Review discharge medications with patient and family and provide education.      Current Discharge Medication List        START taking these medications    Details   diltiaZEM (CARDIZEM CD) 180 MG 24 hr capsule Take 1 capsule (180 mg total) by mouth once daily.  Qty: 30 capsule, Refills: 1    Comments: .      predniSONE (DELTASONE) 20 MG tablet Take 2 tablets (40 mg total) by mouth once daily. for 1 day  Qty: 2 tablet, Refills: 0           CONTINUE these medications which have CHANGED    Details   metoprolol succinate (TOPROL-XL) 25 MG 24 hr tablet Take 2 tablets (50 mg total) by mouth once daily.  Qty: 60 tablet, Refills: 1     Comments: .ok to change to 25mg 2poQD per MD -renetta  Associated Diagnoses: Hypertension, benign           CONTINUE these medications which have NOT CHANGED    Details   albuterol (VENTOLIN HFA) 90 mcg/actuation inhaler inhale 1-2 puffs as needed every 6 hours for wheezing and shortness of breath  Qty: 25.5 g, Refills: 11    Associated Diagnoses: Chronic respiratory failure with hypoxia      ALPRAZolam (XANAX) 0.25 MG tablet Take 1 tablet (0.25 mg total) by mouth daily as needed for Anxiety.  Qty: 30 tablet, Refills: 3      ascorbic acid, vitamin C, (VITAMIN C) 250 MG tablet Take 250 mg by mouth once daily.      atorvastatin (LIPITOR) 40 MG tablet TAKE 1 TABLET BY MOUTH EVERY DAILY  Qty: 90 tablet, Refills: 3    Associated Diagnoses: Hyperlipidemia, unspecified hyperlipidemia type      azelastine (ASTELIN) 137 mcg (0.1 %) nasal spray Instill 1 spray (137 mcg total) by Nasal route 2 (two) times daily.  Qty: 30 mL, Refills: 3    Associated Diagnoses: Seasonal allergic rhinitis due to pollen      b complex vitamins capsule Take 1 capsule by mouth once daily.      biotin 10 mg Tab Take 10 mg by mouth once daily.      calcium carbonate (OS-SANDRINE) 500 mg calcium (1,250 mg) tablet Take 2 tablets (1,000 mg total) by mouth 2 (two) times daily.  Qty: 30 tablet, Refills: 0      cyanocobalamin, vitamin B-12, 1,000 mcg TbSR Take 1,000 mcg by mouth once daily.    Associated Diagnoses: B12 deficiency      denosumab (PROLIA) 60 mg/mL Syrg Inject 1 mL (60 mg total) into the skin every 6 (six) months.  Qty: 2 mL, Refills: 0    Associated Diagnoses: Other osteoporosis, unspecified pathological fracture presence; Closed compression fracture of L3 lumbar vertebra with routine healing, subsequent encounter      DULoxetine (CYMBALTA) 60 MG capsule Take 1 capsule (60 mg total) by mouth once daily.  Qty: 90 capsule, Refills: 2      fluticasone (FLONASE) 50 mcg/actuation nasal spray 1 spray by Each Nare route 2 (two) times daily as needed for  Rhinitis.      fluticasone propion-salmeterol 115-21 mcg/dose (ADVAIR HFA) 115-21 mcg/actuation HFAA inhaler Inhale 2 puffs into the lungs every 12 (twelve) hours.  Qty: 36 g, Refills: 3    Associated Diagnoses: Chronic bronchitis, unspecified chronic bronchitis type      gabapentin (NEURONTIN) 300 MG capsule Take 1 capsule (300 mg total) by mouth 3 (three) times daily.  Qty: 90 capsule, Refills: 11      guaiFENesin (MUCINEX) 600 mg 12 hr tablet Take 1,200 mg by mouth 2 (two) times daily as needed for Congestion.      HYDROcodone-acetaminophen (NORCO)  mg per tablet Take 1 tablet by mouth every 12 (twelve) hours as needed for Pain.  Qty: 60 tablet, Refills: 0    Comments: Quantity prescribed more than 7 day supply? Yes, quantity medically necessary  Associated Diagnoses: Chronic pain syndrome; Primary osteoarthritis of both knees      losartan (COZAAR) 100 MG tablet Take 1 tablet (100 mg total) by mouth once daily. HOLD UNTIL YOU SEE YOUR PCP  Qty: 90 tablet, Refills: 3    Comments: .  Associated Diagnoses: Hypertension, benign      lutein 40 mg Cap Take by mouth.      magnesium oxide (MAG-OX) 400 mg (241.3 mg magnesium) tablet Take 1 tablet by mouth every 12 (twelve) hours.  Qty: 30 tablet, Refills: 0      multivit-min/iron/folic/lutein (CENTRUM SILVER WOMEN ORAL) Take 1 tablet by mouth once daily.      ondansetron (ZOFRAN-ODT) 8 MG TbDL dissolve 1 tablet (8 mg total) by mouth every 8 (eight) hours as needed (nausea).  Qty: 60 tablet, Refills: 2    Associated Diagnoses: Recurrent adenocarcinoma of left lung      pantoprazole (PROTONIX) 40 MG tablet Take 1 tablet (40 mg total) by mouth once daily.  Qty: 90 tablet, Refills: 3    Associated Diagnoses: Diverticulosis      promethazine (PHENERGAN) 25 MG tablet Take 1 tablet (25 mg total) by mouth every 6 (six) hours as needed for Nausea.  Qty: 60 tablet, Refills: 3    Associated Diagnoses: Recurrent adenocarcinoma of left lung      torsemide (DEMADEX) 20 MG Tab  Take 1 tablet (20 mg total) by mouth once daily.  Qty: 90 tablet, Refills: 3    Comments: .      traZODone (DESYREL) 100 MG tablet Take 1 tablet (100 mg total) by mouth every evening.  Qty: 30 tablet, Refills: 2      umeclidinium (INCRUSE ELLIPTA) 62.5 mcg/actuation inhalation capsule Inhale 1 puff into the lungs once daily. Controller  Qty: 90 each, Refills: 3    Associated Diagnoses: Chronic bronchitis, unspecified chronic bronchitis type      vitamin D (VITAMIN D3) 1000 units Tab Take 1 tablet (1,000 Units total) by mouth once daily.  Qty: 30 tablet, Refills: 2      ZINC ORAL Take by mouth.             I have seen and examined this patient face to face today. My clinical findings that support the need for the home health skilled services and home bound status are the following:  Weakness/numbness causing balance and gait disturbance due to Heart Failure, COPD Exacerbation, and Weakness/Debility making it taxing to leave home.  Medical restrictions requiring assistance of another human to leave home due to  Home oxygen requirement.    I certify that this patient is confined to her home and needs intermittent skilled nursing care, physical therapy, and occupational therapy.    Paras Antonio MD  02/20/2023

## 2023-02-20 NOTE — TELEPHONE ENCOUNTER
No new care gaps identified.  [facility] Embedded Care Gaps. Reference number: [id number]. 2/20/2023   10:34:14 AM CST

## 2023-02-20 NOTE — PLAN OF CARE
CM met with pt and MD for final discharge planning assessment.Pt is ready for discharge home today.    [facility] van transportation ordered for 1000. Home Health orders sent to pt's current HH, [facility] thru [facility], pt choice form signed.    Pt has follow-up apts scheduled, info in AVS.    Meds to be delivered to bedside prior to discharge.    Pt ready for discharge from CM perspective.   02/20/23 0850   Final Note   Assessment Type Final Discharge Note   Anticipated Discharge Disposition Home-Health   What phone number can be called within the next 1-3 days to see how you are doing after discharge? [phone]   Hospital Resources/Appts/Education Provided Provided patient/caregiver with written discharge plan information;Appointments scheduled by Navigator/Coordinator;Appointments scheduled and added to AVS;Provided education on problems/symptoms using teachback   Post-Acute Status   Post-Acute Authorization Home Health   Home Health Status Set-up Complete/Auth obtained   Patient choice form signed by patient/caregiver List with quality metrics by geographic area provided;List from CMS Compare;List from System Post-Acute Care   Discharge Delays None known at this time     Scientologist - Med Surg ([place])  Discharge Final Note    Primary Care Provider: Yane Sahni MD    Expected Discharge Date:     Final Discharge Note (most recent)       Final Note - 02/20/23 0850          Final Note    Assessment Type Final Discharge Note (P)      Anticipated Discharge Disposition Home-Health Care Svc (P)      What phone number can be called within the next 1-3 days to see how you are doing after discharge? [phone] (P)      Hospital Resources/Appts/Education Provided Provided patient/caregiver with written discharge plan information;Appointments scheduled by Navigator/Coordinator;Appointments scheduled and added to AVS;Provided education on problems/symptoms using teachback (P)         Post-Acute Status    Post-Acute Authorization Home  Health (P)      Home Health Status Set-up Complete/Auth obtained (P)      Patient choice form signed by patient/caregiver List with quality metrics by geographic area provided;List from CMS Compare;List from System Post-Acute Care (P)      Discharge Delays None known at this time (P)                      Important Message from Medicare  Important Message from Medicare regarding Discharge Appeal Rights: Given to patient/caregiver, Explained to patient/caregiver, Signed/date by patient/caregiver     Date IMM was signed: 02/17/23  Time IMM was signed: 0958    Contact Info       Yane Sahni MD   Specialty: Internal Medicine   Relationship: PCP - General  Hypertension Digital Medicine Responsible Provider  Hyperlipidemia Digital Medicine Responsible Provider    [address] [facility] [address]   Phone: [phone]       Next Steps: Follow up

## 2023-02-21 ENCOUNTER — PATIENT OUTREACH (OUTPATIENT)
Dept: ADMINISTRATIVE | Facility: CLINIC | Age: 73
End: 2023-02-21
Payer: MEDICARE

## 2023-02-25 ENCOUNTER — TELEPHONE (OUTPATIENT)
Dept: ADMINISTRATIVE | Facility: CLINIC | Age: 73
End: 2023-02-25
Payer: MEDICARE

## 2023-02-28 ENCOUNTER — DOCUMENT SCAN (OUTPATIENT)
Dept: HOME HEALTH SERVICES | Facility: HOSPITAL | Age: 73
End: 2023-02-28
Payer: MEDICARE

## 2023-02-28 DIAGNOSIS — J30.1 SEASONAL ALLERGIC RHINITIS DUE TO POLLEN: ICD-10-CM

## 2023-02-28 RX ORDER — AZELASTINE 1 MG/ML
1 SPRAY, METERED NASAL 2 TIMES DAILY
Qty: 90 ML | Refills: 3 | Status: ON HOLD | OUTPATIENT
Start: 2023-02-28 | End: 2023-06-15

## 2023-02-28 NOTE — TELEPHONE ENCOUNTER
Refill Routing Note   Medication(s) are not appropriate for processing by [facility] for the following reason(s):       ED/Hospital Visit since last OV with PCP    [facility] action(s):  Defer         Appointments  past 12m or future 3m with PCP    Date Provider   Last Visit   2/2/2023 Yane Sahni MD   Next Visit   3/9/2023 Yane Sahni MD   ED visits in past 90 days: 0        Note composed:3:04 PM 02/28/2023

## 2023-02-28 NOTE — TELEPHONE ENCOUNTER
No new care gaps identified.  [facility] Embedded Care Gaps. Reference number: [id number]. 2/28/2023   11:14:59 AM CST

## 2023-03-03 ENCOUNTER — DOCUMENTATION ONLY (OUTPATIENT)
Dept: HEMATOLOGY/ONCOLOGY | Facility: CLINIC | Age: 73
End: 2023-03-03
Payer: MEDICARE

## 2023-03-03 NOTE — PROGRESS NOTES
Patient is requesting transportation. Patient requesting visit with Dr. Gilmore on 3/3 pickup at 9:45a and 3/13 at 9a. Patient also requested transportation on 3/9 for her cardiologist and internist. SW explained that transportation could only be provided for oncology visits. Patient understood.    MAURY arranged transportation with Lucila at [employer].

## 2023-03-05 ENCOUNTER — DOCUMENT SCAN (OUTPATIENT)
Dept: HOME HEALTH SERVICES | Facility: HOSPITAL | Age: 73
End: 2023-03-05
Payer: MEDICARE

## 2023-03-06 ENCOUNTER — EXTERNAL HOME HEALTH (OUTPATIENT)
Dept: HOME HEALTH SERVICES | Facility: HOSPITAL | Age: 73
End: 2023-03-06
Payer: MEDICARE

## 2023-03-06 ENCOUNTER — HOSPITAL ENCOUNTER (OUTPATIENT)
Dept: RADIOLOGY | Facility: OTHER | Age: 73
Discharge: HOME | End: 2023-03-06
Attending: INTERNAL MEDICINE
Payer: MEDICARE

## 2023-03-06 ENCOUNTER — OFFICE VISIT (OUTPATIENT)
Dept: HEMATOLOGY/ONCOLOGY | Facility: CLINIC | Age: 73
End: 2023-03-06
Payer: MEDICARE

## 2023-03-06 VITALS
WEIGHT: 195.75 LBS | DIASTOLIC BLOOD PRESSURE: 55 MMHG | RESPIRATION RATE: 18 BRPM | BODY MASS INDEX: 31.46 KG/M2 | OXYGEN SATURATION: 99 % | SYSTOLIC BLOOD PRESSURE: 108 MMHG | TEMPERATURE: 98 F | HEART RATE: 83 BPM | HEIGHT: 66 IN

## 2023-03-06 DIAGNOSIS — F17.200 NICOTINE DEPENDENCE WITH CURRENT USE: ICD-10-CM

## 2023-03-06 DIAGNOSIS — J90 PLEURAL EFFUSION: ICD-10-CM

## 2023-03-06 DIAGNOSIS — C34.92 RECURRENT ADENOCARCINOMA OF LEFT LUNG: Primary | ICD-10-CM

## 2023-03-06 DIAGNOSIS — D63.8 ANEMIA OF CHRONIC DISEASE: ICD-10-CM

## 2023-03-06 DIAGNOSIS — D53.9 NUTRITIONAL ANEMIA, UNSPECIFIED: ICD-10-CM

## 2023-03-06 DIAGNOSIS — J96.11 CHRONIC RESPIRATORY FAILURE WITH HYPOXIA: ICD-10-CM

## 2023-03-06 DIAGNOSIS — N28.9 RENAL LESION: ICD-10-CM

## 2023-03-06 PROCEDURE — 3288F FALL RISK ASSESSMENT DOCD: CPT | Mod: CPTII,S$GLB,, | Performed by: STUDENT IN AN ORGANIZED HEALTH CARE EDUCATION/TRAINING PROGRAM

## 2023-03-06 PROCEDURE — 99215 OFFICE O/P EST HI 40 MIN: CPT | Mod: S$GLB,,, | Performed by: STUDENT IN AN ORGANIZED HEALTH CARE EDUCATION/TRAINING PROGRAM

## 2023-03-06 PROCEDURE — 1157F PR ADVANCE CARE PLAN OR EQUIV PRESENT IN MEDICAL RECORD: ICD-10-PCS | Mod: CPTII,S$GLB,, | Performed by: STUDENT IN AN ORGANIZED HEALTH CARE EDUCATION/TRAINING PROGRAM

## 2023-03-06 PROCEDURE — 3288F PR FALLS RISK ASSESSMENT DOCUMENTED: ICD-10-PCS | Mod: CPTII,S$GLB,, | Performed by: STUDENT IN AN ORGANIZED HEALTH CARE EDUCATION/TRAINING PROGRAM

## 2023-03-06 PROCEDURE — 3008F PR BODY MASS INDEX (BMI) DOCUMENTED: ICD-10-PCS | Mod: CPTII,S$GLB,, | Performed by: STUDENT IN AN ORGANIZED HEALTH CARE EDUCATION/TRAINING PROGRAM

## 2023-03-06 PROCEDURE — 1157F ADVNC CARE PLAN IN RCRD: CPT | Mod: CPTII,S$GLB,, | Performed by: STUDENT IN AN ORGANIZED HEALTH CARE EDUCATION/TRAINING PROGRAM

## 2023-03-06 PROCEDURE — 3008F BODY MASS INDEX DOCD: CPT | Mod: CPTII,S$GLB,, | Performed by: STUDENT IN AN ORGANIZED HEALTH CARE EDUCATION/TRAINING PROGRAM

## 2023-03-06 PROCEDURE — 1159F PR MEDICATION LIST DOCUMENTED IN MEDICAL RECORD: ICD-10-PCS | Mod: CPTII,S$GLB,, | Performed by: STUDENT IN AN ORGANIZED HEALTH CARE EDUCATION/TRAINING PROGRAM

## 2023-03-06 PROCEDURE — 1125F AMNT PAIN NOTED PAIN PRSNT: CPT | Mod: CPTII,S$GLB,, | Performed by: STUDENT IN AN ORGANIZED HEALTH CARE EDUCATION/TRAINING PROGRAM

## 2023-03-06 PROCEDURE — 99215 PR OFFICE/OUTPT VISIT, EST, LEVL V, 40-54 MIN: ICD-10-PCS | Mod: S$GLB,,, | Performed by: STUDENT IN AN ORGANIZED HEALTH CARE EDUCATION/TRAINING PROGRAM

## 2023-03-06 PROCEDURE — 1111F DSCHRG MED/CURRENT MED MERGE: CPT | Mod: CPTII,S$GLB,, | Performed by: STUDENT IN AN ORGANIZED HEALTH CARE EDUCATION/TRAINING PROGRAM

## 2023-03-06 PROCEDURE — 3078F PR MOST RECENT DIASTOLIC BLOOD PRESSURE < 80 MM HG: ICD-10-PCS | Mod: CPTII,S$GLB,, | Performed by: STUDENT IN AN ORGANIZED HEALTH CARE EDUCATION/TRAINING PROGRAM

## 2023-03-06 PROCEDURE — 3078F DIAST BP <80 MM HG: CPT | Mod: CPTII,S$GLB,, | Performed by: STUDENT IN AN ORGANIZED HEALTH CARE EDUCATION/TRAINING PROGRAM

## 2023-03-06 PROCEDURE — 99999 PR PBB SHADOW E&M-EST. PATIENT-LVL V: CPT | Mod: PBBFAC,,, | Performed by: STUDENT IN AN ORGANIZED HEALTH CARE EDUCATION/TRAINING PROGRAM

## 2023-03-06 PROCEDURE — 76770 US EXAM ABDO BACK WALL COMP: CPT | Mod: 26,,, | Performed by: RADIOLOGY

## 2023-03-06 PROCEDURE — 76770 US RETROPERITONEAL COMPLETE: ICD-10-PCS | Mod: 26,,, | Performed by: RADIOLOGY

## 2023-03-06 PROCEDURE — 71046 X-RAY EXAM CHEST 2 VIEWS: CPT | Mod: TC,FY

## 2023-03-06 PROCEDURE — 1111F PR DISCHARGE MEDS RECONCILED W/ CURRENT OUTPATIENT MED LIST: ICD-10-PCS | Mod: CPTII,S$GLB,, | Performed by: STUDENT IN AN ORGANIZED HEALTH CARE EDUCATION/TRAINING PROGRAM

## 2023-03-06 PROCEDURE — 3044F HG A1C LEVEL LT 7.0%: CPT | Mod: CPTII,S$GLB,, | Performed by: STUDENT IN AN ORGANIZED HEALTH CARE EDUCATION/TRAINING PROGRAM

## 2023-03-06 PROCEDURE — 76770 US EXAM ABDO BACK WALL COMP: CPT | Mod: TC

## 2023-03-06 PROCEDURE — 1159F MED LIST DOCD IN RCRD: CPT | Mod: CPTII,S$GLB,, | Performed by: STUDENT IN AN ORGANIZED HEALTH CARE EDUCATION/TRAINING PROGRAM

## 2023-03-06 PROCEDURE — 1125F PR PAIN SEVERITY QUANTIFIED, PAIN PRESENT: ICD-10-PCS | Mod: CPTII,S$GLB,, | Performed by: STUDENT IN AN ORGANIZED HEALTH CARE EDUCATION/TRAINING PROGRAM

## 2023-03-06 PROCEDURE — 4010F ACE/ARB THERAPY RXD/TAKEN: CPT | Mod: CPTII,S$GLB,, | Performed by: STUDENT IN AN ORGANIZED HEALTH CARE EDUCATION/TRAINING PROGRAM

## 2023-03-06 PROCEDURE — 3074F SYST BP LT 130 MM HG: CPT | Mod: CPTII,S$GLB,, | Performed by: STUDENT IN AN ORGANIZED HEALTH CARE EDUCATION/TRAINING PROGRAM

## 2023-03-06 PROCEDURE — 3044F PR MOST RECENT HEMOGLOBIN A1C LEVEL <7.0%: ICD-10-PCS | Mod: CPTII,S$GLB,, | Performed by: STUDENT IN AN ORGANIZED HEALTH CARE EDUCATION/TRAINING PROGRAM

## 2023-03-06 PROCEDURE — 4010F PR ACE/ARB THEARPY RXD/TAKEN: ICD-10-PCS | Mod: CPTII,S$GLB,, | Performed by: STUDENT IN AN ORGANIZED HEALTH CARE EDUCATION/TRAINING PROGRAM

## 2023-03-06 PROCEDURE — 71046 XR CHEST PA AND LATERAL: ICD-10-PCS | Mod: 26,,, | Performed by: RADIOLOGY

## 2023-03-06 PROCEDURE — 3074F PR MOST RECENT SYSTOLIC BLOOD PRESSURE < 130 MM HG: ICD-10-PCS | Mod: CPTII,S$GLB,, | Performed by: STUDENT IN AN ORGANIZED HEALTH CARE EDUCATION/TRAINING PROGRAM

## 2023-03-06 PROCEDURE — 1101F PT FALLS ASSESS-DOCD LE1/YR: CPT | Mod: CPTII,S$GLB,, | Performed by: STUDENT IN AN ORGANIZED HEALTH CARE EDUCATION/TRAINING PROGRAM

## 2023-03-06 PROCEDURE — 99999 PR PBB SHADOW E&M-EST. PATIENT-LVL V: ICD-10-PCS | Mod: PBBFAC,,, | Performed by: STUDENT IN AN ORGANIZED HEALTH CARE EDUCATION/TRAINING PROGRAM

## 2023-03-06 PROCEDURE — 71046 X-RAY EXAM CHEST 2 VIEWS: CPT | Mod: 26,,, | Performed by: RADIOLOGY

## 2023-03-06 PROCEDURE — 1101F PR PT FALLS ASSESS DOC 0-1 FALLS W/OUT INJ PAST YR: ICD-10-PCS | Mod: CPTII,S$GLB,, | Performed by: STUDENT IN AN ORGANIZED HEALTH CARE EDUCATION/TRAINING PROGRAM

## 2023-03-06 NOTE — PROGRESS NOTES
Hematology - oncology PROGRESS NOTE     Subjective:       Patient ID: Nalini Varma is a 72 y.o. female.     Chief Complaint: follow up for chronic anemia and lung cancer     Diagnosis:  1. Anemia of chronic disease  2. Stage I Lung adenocarcinoma of right upper lobe, lepidic pattern, s/p SBRT in Oct/Nov 2021  3. Recurrent Lung cancer 2022     Oncologic History:  1. Ms Varma is a 68 yo woman with chronic pain, anxiety, COPD on 2 liters of home oxygen, CHF with preserved ejection fraction, HTN, CKD stage 3 (creatinine 1.6), who presents for further evaluation of newly diagnosed lung adenocarcinoma. She underwent a screening CT chest on 7/23/20, which showed a 0.6 cm right upper lobe noncalcified pulmonary nodule which measured 0.4 cm on previous CT dated 03/20/2019.  There is a pleural based 1.2 cm pulmonary nodule within the right upper lobe which measured 0.9 cm on previous CT.  There is a 1.2 cm partially spiculated right upper lobe noncalcified pulmonary nodule which measured 0.6 cm on previous CT. She underwent a right lung biopsy on 9/4/2020. Pathology showed adenocarcinoma, well differentiated, lepidic pattern, cannot rule out an invasive component. She presents today for further evaluation. Has been smoking 2PPD for 50 years. Enrolled in smoking cessation program. Now down to 1PPD. Has been on 2L O2 for many years. Needs to use 3 liters when she is wearing a mask. Has CHF. On torsemide. Uses two pillows at night. Unable to walk for a block due to dyspnea. No weight loss. Lives by herself  2. CT head negative for metastases. PET scan 10/7/20: Two right upper lobe nodule with no appreciable activity.  Please note that FDG PET has poor sensitivity for small, well differentiated, and/or indolent malignancies.  An additional subcentimeter node in the apex is too small to characterize by PET for which attention on follow-up is recommended. Mildly enlarged mediastinal lymph nodes, as above, with no  "hypermetabolic activity.  Metastatic involvement is not excluded.  3. Seen by Dr Mead. Underwent SBRT 10/29/21-21       Interval History:   Doing well today. Breathing is stable. Lost some weight.   Chronic cough is stable. No fever, hemoptysis, chest pain.     ECO     ROS:   A ten-point system review is obtained and negative except for what was stated in the Interval History.      Physical Examination:   BP (!) 108/55 (BP Location: Left arm, Patient Position: Sitting, BP Method: Medium (Automatic))   Pulse 83   Temp 98.3 °F (36.8 °C) (Oral)   Resp 18   Ht 5' 6" (1.676 m)   Wt 88.8 kg (195 lb 12.3 oz)   SpO2 99%   BMI 31.60 kg/m²     Physical Exam  Vitals and nursing note reviewed.   Constitutional:       General: She is not in acute distress.  HENT:      Head: Normocephalic and atraumatic.      Mouth/Throat:      Pharynx: No oropharyngeal exudate.   Eyes:      General: No scleral icterus.        Right eye: No discharge.         Left eye: No discharge.   Neck:      Thyroid: No thyromegaly.      Trachea: No tracheal deviation.   Cardiovascular:      Rate and Rhythm: Normal rate and regular rhythm.      Heart sounds: Normal heart sounds. No murmur heard.  Pulmonary:      Effort: Pulmonary effort is normal. No respiratory distress.      Breath sounds: Normal breath sounds. Decreased air movement present. No wheezing or rales.   Abdominal:      General: Bowel sounds are normal. There is no distension.      Palpations: Abdomen is soft.      Tenderness: There is no abdominal tenderness.   Musculoskeletal:         General: No tenderness. Normal range of motion.      Cervical back: Normal range of motion and neck supple.      Right lower leg: Edema present.      Left lower leg: Edema present.   Lymphadenopathy:      Cervical: No cervical adenopathy.   Skin:     General: Skin is warm and dry.      Findings: No rash.   Neurological:      Mental Status: She is alert and oriented to person, place, and time.     "  Cranial Nerves: No cranial nerve deficit.      Gait: Gait is intact.   Psychiatric:         Mood and Affect: Mood and affect normal.       Objective:      Laboratory Data:  Labs reviewed.      Lab Results   Component Value Date    WBC 7.62 03/06/2023    HGB 8.3 (L) 03/06/2023    HCT 25.9 (L) 03/06/2023     (H) 03/06/2023     03/06/2023    LABPROT 10.3 09/12/2022     (L) 03/06/2023    K 4.1 03/06/2023    CL 96 03/06/2023    CO2 27 03/06/2023    BUN 18 03/06/2023    CREATININE 0.9 03/06/2023    ALT 11 03/06/2023    AST 13 03/06/2023    ALKPHOS 91 03/06/2023    BILITOT 0.5 03/06/2023    MG 1.3 (L) 02/14/2023         Imaging Data:  PET scan 10/7/2020:  Impression:     1. Two right upper lobe nodule with no appreciable activity.  Please note that FDG PET has poor sensitivity for small, well differentiated, and/or indolent malignancies.  An additional subcentimeter node in the apex is too small to characterize by PET for which attention on follow-up is recommended.  2. Mildly enlarged mediastinal lymph nodes, as above, with no hypermetabolic activity.  Metastatic involvement is not excluded.  3. Additional CT findings, as above.     CT head 9/25/20:  Impression:     No evidence of acute intracranial pathology.     Moderate patchy mucoperiosteal thickening in the paranasal sinuses.    CT chest 2/11/2021:  Development of central cavitation in previously described 12 mm right upper lobe pulmonary nodule which may be inflammatory in nature including tuberculous and non tuberculous mycobacterial infection, fungal infection etc.  Neoplasm is also included in the differential.     Improved subpleural right upper lobe nodular opacity and stable ground-glass nodule right apex.     Stable calcified granuloma and postinflammatory changes in the lingula probably related to previous non tuberculous mycobacterial infection.     Severe atherosclerosis and severe hepatic steatosis       Oncology History   History of  lung cancer   10/13/2020 Initial Diagnosis    Primary malignant neoplasm of right upper lobe of lung     10/13/2020 Cancer Staged    Staging form: Lung, AJCC 8th Edition  - Clinical stage from 10/13/2020: Stage IA2 (cT1b, cN0, cM0)       10/29/2020 - 11/4/2020 Radiation Therapy    Treating physician: Casey Mead     Site  Technique  Energy  Dose/Fx (Gy)  #Fx  Total Dose (Gy)    RUL Lung - 2 PTV  SBRT  6X  11  5 / 5  55       7/22/2022 Imaging Significant Findings    PET scan July 2022  Impression:     1.1 cm left upper lobe nodule with minimal tracer uptake.  Difficult to characterize on PET due to small size although remains concerning for neoplasm considering increase in size over prior CTs.  Additional stable subcentimeter left upper lobe nodule which is too small to characterize with PET.   PET scan  Stable bandlike opacity and volume loss in the right upper lobe with low level radiotracer uptake.  Findings favored to represent post treatment changes.     Borderline mediastinal lymphadenopathy with radiotracer uptake similar to blood pool.  Findings could represent reactive etiology or metastasis.     8/3/2022 Tumor Conference       CT Chest 7/1/22 demonstrates interval enlargement of BENNY nodule to 1.0 cm; the other two nodules in BENNY are <0.4 cm and stable. PET Benny SUV 1.3 and mediastinal adenopathy 2.3.     Progressing BENNY nodule and pre vascular adenopathy concerning for stage III disease. Difficult locations for biopsy. Poor surgical candidate. Can refer to oncology regarding systemic therapy options. If not a candidate for systemic options, offer radiation to nodule and prevascular adenopathy.     Recurrent adenocarcinoma of left lung   10/28/2022 Initial Diagnosis    Recurrent adenocarcinoma of left lung     11/21/2022 - 1/9/2023 Radiation Therapy    Treating physician: Casey Mead    Site Technique Energy Dose/Fx (Gy) #Fx Total Dose (Gy)   BENNY Lung VMAT 6X 2 23 / 33 46      11/23/2022 -   Chemotherapy    Treatment Summary   Plan Name: OP lung PACLITAXEL CARBOPLATIN WEEKLY  Treatment Goal: Curative  Status: Active  Start Date: 11/23/2022  End Date: 1/13/2023 (Planned)  Provider: Ann Gilmore MD  Chemotherapy: CARBOplatin (PARAPLATIN) 130 mg in sodium chloride 0.9% 100 mL chemo infusion, 130 mg (92.7 % of original dose 140.85 mg), Intravenous, Clinic/HOD 1 time, 1 of 1 cycle  Dose modification:   (original dose 140.85 mg, Cycle 1)  Administration: 130 mg (11/23/2022), 135 mg (12/2/2022), 135 mg (12/9/2022), 165 mg (12/16/2022), 155 mg (1/6/2023)  PACLitaxeL (TAXOL) 40 mg/m2 = 84 mg in sodium chloride 0.9% 250 mL chemo infusion, 40 mg/m2 = 84 mg (100 % of original dose 40 mg/m2), Intravenous, Clinic/HOD 1 time, 1 of 1 cycle  Dose modification: 40 mg/m2 (original dose 40 mg/m2, Cycle 1, Reason: MD Discretion), 40 mg/m2 (Cycle 1)  Administration: 84 mg (11/23/2022), 84 mg (12/2/2022), 84 mg (12/9/2022), 84 mg (12/16/2022), 84 mg (1/6/2023)       1/6/2023 Notable Event    Last chemo     2/13/2023 - 2/20/2023 Hospital Admission    Most Recent Admission Details  Admit Date: 2/13/23  Admitted for: severe sepsis/ copd exacerbation, acute on chronic resp failure, C diff diarrhea  Discharge date: 2/20/23  Discharged from: [facility] ([email or web address])  Discharge disposition: [place]-Health Care [facility] [place]              Assessment and Plan:      1. Recurrent adenocarcinoma of left lung    2. Chronic respiratory failure with hypoxia    3. Anemia of chronic disease    4. Nutritional anemia, unspecified    5. Nicotine dependence with current use              Recurrent lung cancer   Pt had adenocarcinoma which was treated with SBRT in Oct/Nov 2020 with Dr Mead. Recent imaging with CT and PET concerning for recurrence and jamal involvement. She is poor surgical candidate. We discussed weekly chemo along with radiation. If pt is not able to tolerate the chemo, will dose reduce or discontinue chemo. Pt  understands the risks and benefits and prefers to try chemo with radiation with the understanding that if treatment becomes too uncomfortable we will hold chemo and pursue with radiation alone.   Had repeat biopsy procedure, couldn't get tissue, had PTX, req chest tube  Repeat imaging concerning for recurrent and progression of ds. Case d/w Dr Mead over phone on 11/7/22, concurrent chemo radiation to be tried. Pt was started on concurrent chemo radiation. She received 5 weekly cycles of low / reduced dose carbo taxol from Nov 2022 to 1/6/2023.  She had G2 fatigue with treatments.  In Feb 2023 She was admitted to the hospital with severe sepsis with COPD exacerbation and C diff diarrhea. She did not complete the radiation treatment due to prolonged break   She has CXR today and CT chest ordered by Dr Mead coming up    Anemia- hb >7 monitor. She has received iron infusion in 2022, last iron profile was not deficient. Continue monitoring. Labs frm this visit reviewed. Hb >8      Leg swellingbilateral , US in Jan 2023- neg for DVT     Tobacco dependence- pt has been counseled to quit multiple time     Chronic hypoxic resp failure on home O2- stable, monitor        Pancreatic lesion- being followed by Dr Cheema.         ** CXR reviewed- better than prior     Future Appointments   Date Time Provider Department Center   3/9/2023 11:00 AM Barak Brar MD [facility] [id number] [facility]   3/9/2023 12:00 PM Yane Sahni MD [facility] IM [facility]   3/13/2023 10:00 AM [facility] [place] CT OP LIMIT 450 LBS [facility] [place] CTSCANO [facility]   3/13/2023 11:00 AM Casey Mead MD [facility] RAD ONC [facility]   3/31/2023 12:00 PM Yane Sahni MD [facility] IM [facility]   4/5/2023  1:00 PM [facility] PSYCH [facility]   4/20/2023 10:00 AM Helio Cheema MD [facility] AEAUDRA [place] [facility]           I spent >40 mins on reviewing epic chart notes, reviewing tests, nursing concerns,obtaining history, performing physical exam, counseling and  educating patient/family/caregiver, documentation, independently interpreting results and discussing them with patient/family/caregiver, care coordination, ordering medications/ tests/ procedures and referring and communicating with other health care professionals.

## 2023-03-09 ENCOUNTER — OFFICE VISIT (OUTPATIENT)
Dept: CARDIOLOGY | Facility: CLINIC | Age: 73
End: 2023-03-09
Payer: MEDICARE

## 2023-03-09 ENCOUNTER — OFFICE VISIT (OUTPATIENT)
Dept: INTERNAL MEDICINE | Facility: CLINIC | Age: 73
End: 2023-03-09
Attending: INTERNAL MEDICINE
Payer: MEDICARE

## 2023-03-09 ENCOUNTER — DOCUMENT SCAN (OUTPATIENT)
Dept: HOME HEALTH SERVICES | Facility: HOSPITAL | Age: 73
End: 2023-03-09
Payer: MEDICARE

## 2023-03-09 VITALS
DIASTOLIC BLOOD PRESSURE: 70 MMHG | HEART RATE: 89 BPM | OXYGEN SATURATION: 99 % | HEIGHT: 66 IN | SYSTOLIC BLOOD PRESSURE: 132 MMHG | BODY MASS INDEX: 31.6 KG/M2

## 2023-03-09 VITALS — SYSTOLIC BLOOD PRESSURE: 86 MMHG | HEART RATE: 72 BPM | DIASTOLIC BLOOD PRESSURE: 52 MMHG | OXYGEN SATURATION: 93 %

## 2023-03-09 DIAGNOSIS — F17.200 SMOKER: Primary | ICD-10-CM

## 2023-03-09 DIAGNOSIS — F41.9 ANXIETY DISORDER, UNSPECIFIED: ICD-10-CM

## 2023-03-09 DIAGNOSIS — F11.20 UNCOMPLICATED OPIOID DEPENDENCE: ICD-10-CM

## 2023-03-09 DIAGNOSIS — E22.2 SYNDROME OF INAPPROPRIATE SECRETION OF ANTIDIURETIC HORMONE: ICD-10-CM

## 2023-03-09 DIAGNOSIS — R79.89 ABNORMAL TSH: ICD-10-CM

## 2023-03-09 DIAGNOSIS — E87.1 HYPONATREMIA: ICD-10-CM

## 2023-03-09 DIAGNOSIS — J90 PLEURAL EFFUSION: ICD-10-CM

## 2023-03-09 DIAGNOSIS — I50.31 ACUTE DIASTOLIC CONGESTIVE HEART FAILURE: ICD-10-CM

## 2023-03-09 DIAGNOSIS — F17.200 TOBACCO DEPENDENCE: ICD-10-CM

## 2023-03-09 DIAGNOSIS — I35.0 NONRHEUMATIC AORTIC VALVE STENOSIS: ICD-10-CM

## 2023-03-09 DIAGNOSIS — I50.32 CHRONIC DIASTOLIC (CONGESTIVE) HEART FAILURE: ICD-10-CM

## 2023-03-09 DIAGNOSIS — I10 ESSENTIAL HYPERTENSION: ICD-10-CM

## 2023-03-09 DIAGNOSIS — Z85.118 HISTORY OF LUNG CANCER: ICD-10-CM

## 2023-03-09 DIAGNOSIS — J44.9 CHRONIC OBSTRUCTIVE PULMONARY DISEASE, UNSPECIFIED COPD TYPE: ICD-10-CM

## 2023-03-09 DIAGNOSIS — E83.42 HYPOMAGNESEMIA: ICD-10-CM

## 2023-03-09 DIAGNOSIS — Z09 HOSPITAL DISCHARGE FOLLOW-UP: Primary | ICD-10-CM

## 2023-03-09 DIAGNOSIS — E78.5 HYPERLIPIDEMIA, UNSPECIFIED HYPERLIPIDEMIA TYPE: ICD-10-CM

## 2023-03-09 PROBLEM — E66.01 SEVERE OBESITY: Status: RESOLVED | Noted: 2022-04-29 | Resolved: 2023-03-09

## 2023-03-09 PROCEDURE — 3008F BODY MASS INDEX DOCD: CPT | Mod: CPTII,S$GLB,, | Performed by: INTERNAL MEDICINE

## 2023-03-09 PROCEDURE — 99215 OFFICE O/P EST HI 40 MIN: CPT | Mod: S$GLB,,, | Performed by: INTERNAL MEDICINE

## 2023-03-09 PROCEDURE — 1111F PR DISCHARGE MEDS RECONCILED W/ CURRENT OUTPATIENT MED LIST: ICD-10-PCS | Mod: CPTII,S$GLB,, | Performed by: INTERNAL MEDICINE

## 2023-03-09 PROCEDURE — 99999 PR PBB SHADOW E&M-EST. PATIENT-LVL III: CPT | Mod: PBBFAC,,, | Performed by: INTERNAL MEDICINE

## 2023-03-09 PROCEDURE — 3078F DIAST BP <80 MM HG: CPT | Mod: CPTII,S$GLB,, | Performed by: INTERNAL MEDICINE

## 2023-03-09 PROCEDURE — 1111F DSCHRG MED/CURRENT MED MERGE: CPT | Mod: CPTII,S$GLB,, | Performed by: INTERNAL MEDICINE

## 2023-03-09 PROCEDURE — 3074F PR MOST RECENT SYSTOLIC BLOOD PRESSURE < 130 MM HG: ICD-10-PCS | Mod: CPTII,S$GLB,, | Performed by: INTERNAL MEDICINE

## 2023-03-09 PROCEDURE — 1100F PR PT FALLS ASSESS DOC 2+ FALLS/FALL W/INJURY/YR: ICD-10-PCS | Mod: CPTII,S$GLB,, | Performed by: INTERNAL MEDICINE

## 2023-03-09 PROCEDURE — 1157F PR ADVANCE CARE PLAN OR EQUIV PRESENT IN MEDICAL RECORD: ICD-10-PCS | Mod: CPTII,S$GLB,, | Performed by: INTERNAL MEDICINE

## 2023-03-09 PROCEDURE — 1159F PR MEDICATION LIST DOCUMENTED IN MEDICAL RECORD: ICD-10-PCS | Mod: CPTII,S$GLB,, | Performed by: INTERNAL MEDICINE

## 2023-03-09 PROCEDURE — 99999 PR PBB SHADOW E&M-EST. PATIENT-LVL V: CPT | Mod: PBBFAC,,, | Performed by: INTERNAL MEDICINE

## 2023-03-09 PROCEDURE — 3044F HG A1C LEVEL LT 7.0%: CPT | Mod: CPTII,S$GLB,, | Performed by: INTERNAL MEDICINE

## 2023-03-09 PROCEDURE — 3288F PR FALLS RISK ASSESSMENT DOCUMENTED: ICD-10-PCS | Mod: CPTII,S$GLB,, | Performed by: INTERNAL MEDICINE

## 2023-03-09 PROCEDURE — 3078F PR MOST RECENT DIASTOLIC BLOOD PRESSURE < 80 MM HG: ICD-10-PCS | Mod: CPTII,S$GLB,, | Performed by: INTERNAL MEDICINE

## 2023-03-09 PROCEDURE — 1159F MED LIST DOCD IN RCRD: CPT | Mod: CPTII,S$GLB,, | Performed by: INTERNAL MEDICINE

## 2023-03-09 PROCEDURE — 1100F PTFALLS ASSESS-DOCD GE2>/YR: CPT | Mod: CPTII,S$GLB,, | Performed by: INTERNAL MEDICINE

## 2023-03-09 PROCEDURE — 1160F RVW MEDS BY RX/DR IN RCRD: CPT | Mod: CPTII,S$GLB,, | Performed by: INTERNAL MEDICINE

## 2023-03-09 PROCEDURE — 1157F ADVNC CARE PLAN IN RCRD: CPT | Mod: CPTII,S$GLB,, | Performed by: INTERNAL MEDICINE

## 2023-03-09 PROCEDURE — 1125F PR PAIN SEVERITY QUANTIFIED, PAIN PRESENT: ICD-10-PCS | Mod: CPTII,S$GLB,, | Performed by: INTERNAL MEDICINE

## 2023-03-09 PROCEDURE — 3008F PR BODY MASS INDEX (BMI) DOCUMENTED: ICD-10-PCS | Mod: CPTII,S$GLB,, | Performed by: INTERNAL MEDICINE

## 2023-03-09 PROCEDURE — 1125F AMNT PAIN NOTED PAIN PRSNT: CPT | Mod: CPTII,S$GLB,, | Performed by: INTERNAL MEDICINE

## 2023-03-09 PROCEDURE — 99999 PR PBB SHADOW E&M-EST. PATIENT-LVL V: ICD-10-PCS | Mod: PBBFAC,,, | Performed by: INTERNAL MEDICINE

## 2023-03-09 PROCEDURE — 3044F PR MOST RECENT HEMOGLOBIN A1C LEVEL <7.0%: ICD-10-PCS | Mod: CPTII,S$GLB,, | Performed by: INTERNAL MEDICINE

## 2023-03-09 PROCEDURE — 3075F SYST BP GE 130 - 139MM HG: CPT | Mod: CPTII,S$GLB,, | Performed by: INTERNAL MEDICINE

## 2023-03-09 PROCEDURE — 99204 OFFICE O/P NEW MOD 45 MIN: CPT | Mod: S$GLB,,, | Performed by: INTERNAL MEDICINE

## 2023-03-09 PROCEDURE — 3288F FALL RISK ASSESSMENT DOCD: CPT | Mod: CPTII,S$GLB,, | Performed by: INTERNAL MEDICINE

## 2023-03-09 PROCEDURE — 3075F PR MOST RECENT SYSTOLIC BLOOD PRESS GE 130-139MM HG: ICD-10-PCS | Mod: CPTII,S$GLB,, | Performed by: INTERNAL MEDICINE

## 2023-03-09 PROCEDURE — 1160F PR REVIEW ALL MEDS BY PRESCRIBER/CLIN PHARMACIST DOCUMENTED: ICD-10-PCS | Mod: CPTII,S$GLB,, | Performed by: INTERNAL MEDICINE

## 2023-03-09 PROCEDURE — 99215 PR OFFICE/OUTPT VISIT, EST, LEVL V, 40-54 MIN: ICD-10-PCS | Mod: S$GLB,,, | Performed by: INTERNAL MEDICINE

## 2023-03-09 PROCEDURE — 99204 PR OFFICE/OUTPT VISIT, NEW, LEVL IV, 45-59 MIN: ICD-10-PCS | Mod: S$GLB,,, | Performed by: INTERNAL MEDICINE

## 2023-03-09 PROCEDURE — 99999 PR PBB SHADOW E&M-EST. PATIENT-LVL III: ICD-10-PCS | Mod: PBBFAC,,, | Performed by: INTERNAL MEDICINE

## 2023-03-09 PROCEDURE — 3074F SYST BP LT 130 MM HG: CPT | Mod: CPTII,S$GLB,, | Performed by: INTERNAL MEDICINE

## 2023-03-09 RX ORDER — TORSEMIDE 20 MG/1
20 TABLET ORAL DAILY
Qty: 180 TABLET | Refills: 3 | Status: ON HOLD | OUTPATIENT
Start: 2023-03-09 | End: 2023-06-15 | Stop reason: HOSPADM

## 2023-03-09 RX ORDER — FERROUS SULFATE 325(65) MG
325 TABLET ORAL
Status: ON HOLD | COMMUNITY
End: 2023-06-15

## 2023-03-09 NOTE — PROGRESS NOTES
Subjective:   Patient ID: Nalini Varma is a 72 y.o. female  Chief complaint:   Chief Complaint   Patient presents with    Hypertension         HPI  Here for HTN follow up:   - and hospital follow up admitted 2/13-2/20     - admitted for elevated bnp, hypoxia, diarrhea  - started on IV Lasix with improvement and then off BiPAP  - Continues to improve and close to baseline  - Changed to po Torsemide on 2/17/2023 and increased to bid  - started on burst steroids.    Acute on chronic diastolid hf:   - echo reviewed   To continue torsemide 20mg daily     HTN:   Cont diltizaem started by cards     CKD3: stable     Recurrent AdenoCA left lung:   Unknown staging as bx unable to be performed - s/p chemo/xrt in Dec and stopped in Jan - to repeat scan in 1 month   - to f/u with h/o   Radiation dose 1/9/2023 - to resume 1/30/2023 but too weak to come to radiation appts     Diarrhea:   S/p tx for cdiff previously - no diarrhea during hospitalization   - resolved     Anxiety and MDD:   Stable on duloxetine    LESLY: c/w oral iron since home     SIADH:   Na stable     Today reviewed cxr - improved - small effusions still seen   Taking torsemide 20mg daily   No further swelling - no further hand swelling   Sleeps on 1 pillow  this haa improved - has hospital bed and reports made a big difference in how she feels since can adjust position easily   Now has new weight scale for home to monitor weight     Renal ultrasound: simple appearing cysts   Still receiving HH with nurse and PT     Smoking:   Still smoking   Attended tob cess in past - not ready to quit currently     COPD - breathing is stable   On 3L which is her baseline    HTN:  As above   On torsemide 20  Dilt 180  Toprol xl 25  Previously:   Arb held due to colin and gi losses and uti at prior admission   - held losartan 100mg since last discharge    Osteoprosis:   Due for prolia - was given by our staff 9/21/2022 - due in March 2023    Review of  "Systems    Objective:  Vitals:    03/09/23 1236   BP: 132/70   Pulse: 89   SpO2: 99%   Height: 5' 6" (1.676 m)     Body mass index is 31.6 kg/m².    Physical Exam  Constitutional:       General: She is not in acute distress.     Appearance: She is well-developed. She is not diaphoretic.   Eyes:      General:         Right eye: No discharge.         Left eye: No discharge.      Conjunctiva/sclera: Conjunctivae normal.   Pulmonary:      Effort: Pulmonary effort is normal. No respiratory distress.      Comments: Nc in place   Talking in complete sentences  Skin:     General: Skin is warm.      Findings: No erythema.   Neurological:      Mental Status: She is alert and oriented to person, place, and time.   Psychiatric:         Behavior: Behavior normal.         Thought Content: Thought content normal.         Judgment: Judgment normal.       Assessment:  1. Hospital discharge follow-up    2. Chronic obstructive pulmonary disease, unspecified COPD type    3. History of lung cancer    4. Essential hypertension    5. Hyponatremia    6. Uncomplicated opioid dependence    7. Pleural effusion    8. Tobacco dependence    9. Syndrome of inappropriate secretion of antidiuretic hormone    10. Chronic diastolic (congestive) heart failure    11. Abnormal TSH    12. Hypomagnesemia    13. Anxiety disorder, unspecified        Plan:  Nalini was seen today for hypertension.    Diagnoses and all orders for this visit:    Hospital discharge follow-up    Chronic obstructive pulmonary disease, unspecified COPD type  -     B-TYPE NATRIURETIC PEPTIDE; Future    History of lung cancer    Essential hypertension  See below   Stable     Hyponatremia  Stable   Cont to monitor     Uncomplicated opioid dependence  Stable   Cont current med regimen     Pleural effusion  Stable   Cont diuretic and f/u with specialists     Tobacco dependence  Counseled to quit     Syndrome of inappropriate secretion of antidiuretic hormone    Chronic diastolic " (congestive) heart failure  -     torsemide (DEMADEX) 20 MG Tab; Take 1 tablet (20 mg total) by mouth once daily. Take an extra dose daily for 3 days if gains 2-3 pounds in 2-3 days  -     Basic Metabolic Panel; Future  As below     Abnormal TSH  -     TSH; Future    Hypomagnesemia  -     Magnesium; Future  Resume oral suppl     Anxiety disorder, unspecified  -     TSH; Future  Cont med and check lab     Today:   can add back losartan if needed at lower dose if electrolytes stable and bp inc in future - stable on home readings   F/u with rad onc with ct chest next week   Inc torsemide 20mg daily and inc to 40mg for 3 days and then repeat bmp next week and resume 20mg at that time   Cont currnet bp meds - dilt and metopr  Cont dig htn prog  Cont HH  Counseled to quit tob   Covid booster   Lisa cymbalta and helped with anxiety   To resume mag oxide 400 today bid     40 min spent in care of patient including chart review, history, orders, physical, orders and coordination of care      Health Maintenance   Topic Date Due    Mammogram  09/21/2023    High Dose Statin  03/09/2024    TETANUS VACCINE  01/29/2025    DEXA Scan  04/14/2025    Lipid Panel  02/15/2027    Hepatitis C Screening  Completed

## 2023-03-09 NOTE — PATIENT INSTRUCTIONS
Stop diltiazem.  Check BP before taking medication.  Hold BP medication if SBP (top number) is 100 or below.

## 2023-03-09 NOTE — ASSESSMENT & PLAN NOTE
Patient is identified as having Diastolic (HFpEF) heart failure that is Acute on chronic. CHF is currently controlled. Latest ECHO performed and demonstrates- Results for orders placed during the hospital encounter of 02/13/23    Echo    Interpretation Summary  · The left ventricle is normal in size with concentric remodeling and normal systolic function.  · Mild left atrial enlargement.  · The estimated ejection fraction is 60%.  · Grade III left ventricular diastolic dysfunction.  · Normal right ventricular size with normal right ventricular systolic function.  · There is mild aortic valve stenosis.  · Aortic valve area is 1.50 cm2; peak velocity is 3.06 m/s; mean gradient is 22 mmHg.  · Mild tricuspid regurgitation.  . Continue Beta Blocker and monitor clinical status closely. Monitor on telemetry. Patient is on CHF pathway.  Monitor strict Is&Os and daily weights.  Place on fluid restriction of 1.5 L. Continue to stress to patient importance of self efficacy and  on diet for CHF. Last BNP reviewed- and noted below @LABRCNTIP(BNP,BNPTRIAGEBLO)@.

## 2023-03-09 NOTE — PROGRESS NOTES
Cardiology    3/9/2023  3:23 PM    Problem list  Patient Active Problem List   Diagnosis    Chronic obstructive pulmonary disease    Opioid dependence    Essential hypertension    Iron deficiency anemia    Tobacco dependence    Hyperlipidemia    Osteoporosis    Generalized anxiety disorder    Hypomagnesemia    Pulmonary nodule    Diverticulosis    Hyperkalemia    Chronic diastolic (congestive) heart failure    History of L3 compression fracture    Vitamin D deficiency    Vitamin B12 deficiency    Chronic pain syndrome    History of lung cancer    Chronic GI bleeding    History of alcohol abuse    Choledocholithiasis    Elevated LFTs    Urinary retention    Cervical spinal stenosis    Debility    Acute on chronic respiratory failure with hypoxia and hypercapnia    Hyponatremia    Hypocalcemia    Spinal stenosis    Multiple thyroid nodules    CKD (chronic kidney disease) stage 3, GFR 30-59 ml/min    MDD (major depressive disorder), recurrent severe, without psychosis    PTSD (post-traumatic stress disorder)    Aortic atherosclerosis    Iron deficiency anemia due to chronic blood loss    Syndrome of inappropriate secretion of antidiuretic hormone    Other nonthrombocytopenic purpura    Disorder of joint prosthesis    Secondary malignant neoplasm of intrathoracic lymph nodes    Recurrent adenocarcinoma of left lung    Shock, unspecified    Acute renal failure superimposed on stage 3 chronic kidney disease    Advance care planning    Anemia of chronic disease    Electrolyte abnormality    Pleural effusion    Myelopathy in diseases classified elsewhere    Diarrhea       CC:  F/u    HPI:  Patient is here for follow-up [facility] for acute diastolic heart failure.  She was seen by Dr Roberts.  She wants to change her care to here.  She could not get appointment with Dr Ortez.  Since discharge, she is breathing better.  She is still requiring oxygen.  She was told to increase her diuretics for next 3 days.   She denies any chest pain.  She is still smoking.  She is not taking losartan.  She took her diltiazem and metoprolol already today.    Medications  Current Outpatient Medications   Medication Sig Dispense Refill    albuterol (VENTOLIN HFA) 90 mcg/actuation inhaler inhale 1-2 puffs as needed every 6 hours for wheezing and shortness of breath 25.5 g 11    ALPRAZolam (XANAX) 0.25 MG tablet Take 1 tablet (0.25 mg total) by mouth daily as needed for Anxiety. 30 tablet 3    ascorbic acid, vitamin C, (VITAMIN C) 250 MG tablet Take 250 mg by mouth once daily.      atorvastatin (LIPITOR) 40 MG tablet TAKE 1 TABLET BY MOUTH EVERY DAILY 90 tablet 3    azelastine (ASTELIN) 137 mcg (0.1 %) nasal spray Instill 1 spray (137 mcg total) by Nasal route 2 (two) times daily. 90 mL 3    b complex vitamins capsule Take 1 capsule by mouth once daily.      biotin 10 mg Tab Take 10 mg by mouth once daily.      calcium carbonate (OS-SANDRINE) 500 mg calcium (1,250 mg) tablet Take 2 tablets (1,000 mg total) by mouth 2 (two) times daily. 30 tablet 0    cyanocobalamin, vitamin B-12, 1,000 mcg TbSR Take 1,000 mcg by mouth once daily.      denosumab (PROLIA) 60 mg/mL Syrg Inject 1 mL (60 mg total) into the skin every 6 (six) months. 2 mL 0    diltiaZEM (CARDIZEM CD) 180 MG 24 hr capsule Take 1 capsule (180 mg total) by mouth once daily. 30 capsule 1    DULoxetine (CYMBALTA) 60 MG capsule Take 1 capsule (60 mg total) by mouth once daily. 90 capsule 2    ferrous sulfate (FEOSOL) 325 mg (65 mg iron) Tab tablet Take 325 mg by mouth daily with breakfast.      fluticasone (FLONASE) 50 mcg/actuation nasal spray 1 spray by Each Nare route 2 (two) times daily as needed for Rhinitis.      fluticasone propion-salmeterol 115-21 mcg/dose (ADVAIR HFA) 115-21 mcg/actuation HFAA inhaler Inhale 2 puffs into the lungs every 12 (twelve) hours. 36 g 3    gabapentin (NEURONTIN) 300 MG capsule Take 1 capsule (300 mg total) by mouth 3 (three) times daily. 90 capsule 11     guaiFENesin (MUCINEX) 600 mg 12 hr tablet Take 1,200 mg by mouth 2 (two) times daily as needed for Congestion.      HYDROcodone-acetaminophen (NORCO)  mg per tablet Take 1 tablet by mouth every 12 (twelve) hours as needed for Pain. 60 tablet 0    lutein 40 mg Cap Take by mouth.      magnesium oxide (MAG-OX) 400 mg (241.3 mg magnesium) tablet Take 1 tablet by mouth every 12 (twelve) hours. 30 tablet 0    metoprolol succinate (TOPROL-XL) 25 MG 24 hr tablet Take 2 tablets (50 mg total) by mouth once daily. 60 tablet 1    multivit-min/iron/folic/lutein (CENTRUM SILVER WOMEN ORAL) Take 1 tablet by mouth once daily.      ondansetron (ZOFRAN-ODT) 8 MG TbDL dissolve 1 tablet (8 mg total) by mouth every 8 (eight) hours as needed (nausea). 60 tablet 2    pantoprazole (PROTONIX) 40 MG tablet Take 1 tablet (40 mg total) by mouth once daily. 90 tablet 3    promethazine (PHENERGAN) 25 MG tablet Take 1 tablet (25 mg total) by mouth every 6 (six) hours as needed for Nausea. 60 tablet 3    torsemide (DEMADEX) 20 MG Tab Take 1 tablet (20 mg total) by mouth once daily. Take an extra dose daily for 3 days if gains 2-3 pounds in 2-3 days 180 tablet 3    traZODone (DESYREL) 100 MG tablet Take 1 tablet (100 mg total) by mouth every evening. 30 tablet 2    umeclidinium (INCRUSE ELLIPTA) 62.5 mcg/actuation inhalation capsule Inhale 1 puff into the lungs once daily. Controller 90 each 3    vitamin D (VITAMIN D3) 1000 units Tab Take 1 tablet (1,000 Units total) by mouth once daily. 30 tablet 2    ZINC ORAL Take by mouth.       No current facility-administered medications for this visit.      Prior to Admission medications    Medication Sig Start Date End Date Taking? Authorizing Provider   albuterol (VENTOLIN HFA) 90 mcg/actuation inhaler inhale 1-2 puffs as needed every 6 hours for wheezing and shortness of breath 2/2/23  Yes Yane Sahni MD   ALPRAZolam (XANAX) 0.25 MG tablet Take 1 tablet (0.25 mg total) by mouth daily as  needed for Anxiety. 1/4/23 5/4/23 Yes JESE New   ascorbic acid, vitamin C, (VITAMIN C) 250 MG tablet Take 250 mg by mouth once daily.   Yes Historical Provider   atorvastatin (LIPITOR) 40 MG tablet TAKE 1 TABLET BY MOUTH EVERY DAILY 8/29/22  Yes Yane Sahni MD   azelastine (ASTELIN) 137 mcg (0.1 %) nasal spray Instill 1 spray (137 mcg total) by Nasal route 2 (two) times daily. 2/28/23  Yes Yane Sahni MD   b complex vitamins capsule Take 1 capsule by mouth once daily.   Yes Historical Provider   biotin 10 mg Tab Take 10 mg by mouth once daily.   Yes Historical Provider   calcium carbonate (OS-SANDRINE) 500 mg calcium (1,250 mg) tablet Take 2 tablets (1,000 mg total) by mouth 2 (two) times daily. 9/7/21 3/9/23 Yes Lea Bourne NP   cyanocobalamin, vitamin B-12, 1,000 mcg TbSR Take 1,000 mcg by mouth once daily. 9/7/21  Yes Lea Bourne NP   denosumab (PROLIA) 60 mg/mL Syrg Inject 1 mL (60 mg total) into the skin every 6 (six) months. 2/20/23 2/20/24 Yes Yane Sahni MD   diltiaZEM (CARDIZEM CD) 180 MG 24 hr capsule Take 1 capsule (180 mg total) by mouth once daily. 2/21/23 2/21/24 Yes Paras Antonio MD   DULoxetine (CYMBALTA) 60 MG capsule Take 1 capsule (60 mg total) by mouth once daily. 8/5/22 5/2/23 Yes JESE New   ferrous sulfate (FEOSOL) 325 mg (65 mg iron) Tab tablet Take 325 mg by mouth daily with breakfast.   Yes Historical Provider   fluticasone (FLONASE) 50 mcg/actuation nasal spray 1 spray by Each Nare route 2 (two) times daily as needed for Rhinitis.   Yes Historical Provider   fluticasone propion-salmeterol 115-21 mcg/dose (ADVAIR HFA) 115-21 mcg/actuation HFAA inhaler Inhale 2 puffs into the lungs every 12 (twelve) hours. 11/29/22 11/29/23 Yes Yane Sahni MD   gabapentin (NEURONTIN) 300 MG capsule Take 1 capsule (300 mg total) by mouth 3 (three) times daily. 10/10/22 10/10/23 Yes Yane Sahni MD   guaiFENesin (MUCINEX) 600 mg 12 hr tablet  Take 1,200 mg by mouth 2 (two) times daily as needed for Congestion.   Yes Historical Provider   HYDROcodone-acetaminophen (NORCO)  mg per tablet Take 1 tablet by mouth every 12 (twelve) hours as needed for Pain. 2/20/23  Yes Yane Sahni MD   lutein 40 mg Cap Take by mouth.   Yes Historical Provider   magnesium oxide (MAG-OX) 400 mg (241.3 mg magnesium) tablet Take 1 tablet by mouth every 12 (twelve) hours. 9/7/21  Yes Lea Bourne NP   metoprolol succinate (TOPROL-XL) 25 MG 24 hr tablet Take 2 tablets (50 mg total) by mouth once daily. 2/20/23 2/20/24 Yes Paras Antonio MD   multivit-min/iron/folic/lutein (CENTRUM SILVER WOMEN ORAL) Take 1 tablet by mouth once daily.   Yes Historical Provider   ondansetron (ZOFRAN-ODT) 8 MG TbDL dissolve 1 tablet (8 mg total) by mouth every 8 (eight) hours as needed (nausea). 11/15/22 11/15/23 Yes Ann Gilmore MD   pantoprazole (PROTONIX) 40 MG tablet Take 1 tablet (40 mg total) by mouth once daily. 10/10/22  Yes Yane Sahni MD   promethazine (PHENERGAN) 25 MG tablet Take 1 tablet (25 mg total) by mouth every 6 (six) hours as needed for Nausea. 11/15/22  Yes Ann Gilmore MD   torsemide (DEMADEX) 20 MG Tab Take 1 tablet (20 mg total) by mouth once daily. Take an extra dose daily for 3 days if gains 2-3 pounds in 2-3 days 3/9/23 3/8/24 Yes Yane Sahni MD   traZODone (DESYREL) 100 MG tablet Take 1 tablet (100 mg total) by mouth every evening. 1/3/23 4/5/23 Yes JESE New   umeclidinium (INCRUSE ELLIPTA) 62.5 mcg/actuation inhalation capsule Inhale 1 puff into the lungs once daily. Controller 12/4/22  Yes Yane Sahni MD   vitamin D (VITAMIN D3) 1000 units Tab Take 1 tablet (1,000 Units total) by mouth once daily. 9/7/21  Yes Lea Bourne NP   ZINC ORAL Take by mouth.   Yes Historical Provider   losartan (COZAAR) 100 MG tablet Take 1 tablet (100 mg total) by mouth once daily. HOLD UNTIL YOU SEE YOUR PCP 1/24/23 3/9/23   Elena Dahl MD   torsemide (DEMADEX) 20 MG Tab Take 1 tablet (20 mg total) by mouth once daily. 8/29/22 3/9/23  Yane Sahni MD         History  Past Medical History:   Diagnosis Date    Anxiety     Cervical spinal stenosis     Choledocholithiasis     Chronic obstructive pulmonary disease 03/16/2016    Degenerative disc disease of cervical spine     Diverticulosis 04/24/2018    History of alcohol abuse 03/02/2021    History of gastric ulcer     History of L3 compression fracture 12/19/2018    History of lung cancer     s/p completion of XRT in 10/2020    Hyperlipidemia     Iron deficiency anemia     Osteoarthritis     Stage III CKD 2017     Past Surgical History:   Procedure Laterality Date    BREAST CYST EXCISION Right     1967    CATARACT EXTRACTION      COLONOSCOPY  2015    COLONOSCOPY N/A 6/8/2022    Procedure: COLONOSCOPY;  Surgeon: Helio Cheema MD;  Location: [address]);  Service: Endoscopy;  Laterality: N/A;  5/25-Pt requesting Dr. Cheema-approval given per Dr. Cheema-ml  Fully vaccinated, prep instr portal -ml    CORONARY ANGIOGRAPHY N/A 7/20/2018    Procedure: ANGIOGRAM, CORONARY ARTERY;  Surgeon: Willard Roberts MD;  Location: [facility] CATH LAB;  Service: Cardiovascular;  Laterality: N/A;    ENDOSCOPIC ULTRASOUND OF UPPER GASTROINTESTINAL TRACT N/A 3/24/2021    Procedure: ULTRASOUND, UPPER GI TRACT, ENDOSCOPIC;  Surgeon: Helio Cheema MD;  Location: [address]);  Service: Endoscopy;  Laterality: N/A;    ENDOSCOPIC ULTRASOUND OF UPPER GASTROINTESTINAL TRACT N/A 4/25/2022    Procedure: ULTRASOUND, UPPER GI TRACT, ENDOSCOPIC;  Surgeon: Helio Cheema MD;  Location: [address]);  Service: Endoscopy;  Laterality: N/A;  cardiac risk assessment 1 per Dr. Ortez. see t/e 3/17-SC  3/25:new instructions via portal. home with medical transport?-SC    ERCP N/A 3/24/2021    Procedure: ERCP (ENDOSCOPIC RETROGRADE CHOLANGIOPANCREATOGRAPHY);  Surgeon: Helio GERMAIN  MD Deni;  Location: [facility] ([facility] FLR);  Service: Endoscopy;  Laterality: N/A;    ERCP N/A 4/30/2021    Procedure: ERCP (ENDOSCOPIC RETROGRADE CHOLANGIOPANCREATOGRAPHY);  Surgeon: Boo Gray MD;  Location: [facility] (2ND FLR);  Service: Endoscopy;  Laterality: N/A;    ERCP N/A 7/1/2021    Procedure: ERCP (ENDOSCOPIC RETROGRADE CHOLANGIOPANCREATOGRAPHY);  Surgeon: Helio Cheema MD;  Location: [facility] (2ND FLR);  Service: Endoscopy;  Laterality: N/A;  Ok for Taxi. Dr Cheema  rapid covid [id number]- tb inst email    ESOPHAGOGASTRODUODENOSCOPY  2015    ESOPHAGOGASTRODUODENOSCOPY N/A 3/4/2021    Procedure: EGD (ESOPHAGOGASTRODUODENOSCOPY);  Surgeon: Yenifer Ramirez MD;  Location: [facility];  Service: Endoscopy;  Laterality: N/A;    ESOPHAGOGASTRODUODENOSCOPY N/A 3/24/2021    Procedure: EGD (ESOPHAGOGASTRODUODENOSCOPY);  Surgeon: Helio Cheema MD;  [facility]  Service: Endoscopy;  Laterality: N/A;    EYE SURGERY  2016    Cataracts    FRACTURE SURGERY  2014    JOINT REPLACEMENT  2007    ORIF FEMUR FRACTURE Left     TOTAL KNEE ARTHROPLASTY Left 2007    TUBAL LIGATION       Social History     Socioeconomic History    Marital status: Single    Number of children: 2   Occupational History    Occupation: unemployed   Tobacco Use    Smoking status: Every Day     Packs/day: 1.00     Years: 53.00     Pack years: 53.00     Types: Cigarettes     Start date: 4/30/1967    Smokeless tobacco: Never    Tobacco comments:     I had quit for about 6 months this past year. Then massive stress and started back up sometimes   Substance and Sexual Activity    Alcohol use: Yes     Alcohol/week: 7.0 standard drinks     Types: 7 Drinks containing 0.5 oz of alcohol per week     Comment: at least 1 cocktail a day- vodka    Drug use: No    Sexual activity: Not Currently     Partners: Male     Birth control/protection: Abstinence, Post-menopausal   Other Topics Concern    Are you pregnant or think you may be? No    Social History Narrative    Originally from [place]    Living in [facility] since 1998    Living in [facility]     Social Determinants of Health     Financial Resource Strain: Medium Risk    Difficulty of Paying Living Expenses: Somewhat hard   Food Insecurity: Food Insecurity Present    Worried About Running Out of Food in the Last Year: Sometimes true    Ran Out of Food in the Last Year: Sometimes true   Transportation Needs: Unmet Transportation Needs    Lack of Transportation (Medical): Yes    Lack of Transportation (Non-Medical): Yes   Physical Activity: Sufficiently Active    Days of Exercise per Week: 5 days    Minutes of Exercise per Session: 30 min   Stress: No Stress Concern Present    Feeling of Stress : Only a little   Social Connections: Socially Isolated    Frequency of Communication with Friends and Family: Once a week    Frequency of Social Gatherings with Friends and Family: Three times a week    Attends Church Services: Never    Active Member of Clubs or Organizations: No    Attends Club or Organization Meetings: Patient refused    Marital Status:    Housing Stability: High Risk    Unable to Pay for Housing in the Last Year: Yes    Number of Places Lived in the Last Year: 2    Unstable Housing in the Last Year: Yes         Allergies  Review of patient's allergies indicates:   Allergen Reactions    Wellbutrin [bupropion hcl] Other (See Comments)     Hyponatremia and hypomagnesia     Zoloft [sertraline] Other (See Comments)     Hyponatremia and hypomagnesia     Bananas [banana]      Emesis, and stomach cramps    Patient states she is not allergic to Banana; she cannot eat one everyday         Review of Systems   Review of Systems   Cardiovascular:  Positive for dyspnea on exertion and leg swelling. Negative for chest pain, claudication, cyanosis, irregular heartbeat, near-syncope, orthopnea, palpitations, paroxysmal nocturnal dyspnea and syncope.   Respiratory:  Positive for shortness of  breath. Negative for cough, hemoptysis, sleep disturbances due to breathing, snoring, sputum production and wheezing.        Physical Exam  Wt Readings from Last 1 Encounters:   03/06/23 88.8 kg (195 lb 12.3 oz)     BP Readings from Last 3 Encounters:   03/09/23 (!) 86/52   03/09/23 132/70   03/06/23 (!) 108/55     Pulse Readings from Last 1 Encounters:   03/09/23 72     There is no height or weight on file to calculate BMI.    Physical Exam  Vitals reviewed.   Constitutional:       Appearance: She is ill-appearing.   Neck:      Vascular: No JVD.   Cardiovascular:      Rate and Rhythm: Normal rate and regular rhythm.      Heart sounds: S1 normal and S2 normal.   Pulmonary:      Breath sounds: Examination of the right-lower field reveals rales. Examination of the left-lower field reveals rales. Rales present.   Musculoskeletal:      Right lower leg: Edema present.      Left lower leg: Edema present.           Assessment  1. Smoker  unchanged  - Ambulatory referral/consult to Smoking Cessation Program; Future    2. Chronic diastolic (congestive) heart failure  improving    3. Hyperlipidemia, unspecified hyperlipidemia type  stable    4. Acute diastolic congestive heart failure  improving    5. Essential hypertension  Low BP    6.  Lung cancer s/p XRT and chemo    7.  Mild AS      Plan and Discussion  Reviewed her hospitalization records.  Discussed her manual blood pressure is low in both arms.  Will stop diltiazem.  Instructed patient to monitor blood pressure and hold her blood pressure medication if his systolic blood pressure is 100 below.  Continue current diuretic.    Follow Up  2 months      Barak Brar MD, F.A.C.C, F.S.C.A.I.        40 minutes were spent in chart review, documentation and review of results, and evaluation, treatment, and counseling of patient on the same day of service.    Disclaimer: This document was created using voice recognition software (M*Modal Fluency Direct). Although it may be  edited, this document may contain errors related to incorrect recognition of the spoken word. Please call the physician if clarification is needed.

## 2023-03-10 ENCOUNTER — DOCUMENT SCAN (OUTPATIENT)
Dept: HOME HEALTH SERVICES | Facility: HOSPITAL | Age: 73
End: 2023-03-10
Payer: MEDICARE

## 2023-03-11 ENCOUNTER — DOCUMENT SCAN (OUTPATIENT)
Dept: HOME HEALTH SERVICES | Facility: HOSPITAL | Age: 73
End: 2023-03-11
Payer: MEDICARE

## 2023-03-13 ENCOUNTER — TELEPHONE (OUTPATIENT)
Dept: HEMATOLOGY/ONCOLOGY | Facility: CLINIC | Age: 73
End: 2023-03-13
Payer: MEDICARE

## 2023-03-13 ENCOUNTER — HOSPITAL ENCOUNTER (OUTPATIENT)
Dept: RADIOLOGY | Facility: OTHER | Age: 73
Discharge: HOME | End: 2023-03-13
Attending: RADIOLOGY
Payer: MEDICARE

## 2023-03-13 ENCOUNTER — OFFICE VISIT (OUTPATIENT)
Dept: RADIATION ONCOLOGY | Facility: CLINIC | Age: 73
End: 2023-03-13
Payer: MEDICARE

## 2023-03-13 VITALS
DIASTOLIC BLOOD PRESSURE: 58 MMHG | HEIGHT: 66 IN | SYSTOLIC BLOOD PRESSURE: 117 MMHG | TEMPERATURE: 98 F | HEART RATE: 71 BPM | BODY MASS INDEX: 31.34 KG/M2 | OXYGEN SATURATION: 100 % | RESPIRATION RATE: 17 BRPM | WEIGHT: 195 LBS

## 2023-03-13 DIAGNOSIS — Z85.118 HISTORY OF LUNG CANCER: ICD-10-CM

## 2023-03-13 DIAGNOSIS — C34.92 RECURRENT ADENOCARCINOMA OF LEFT LUNG: ICD-10-CM

## 2023-03-13 DIAGNOSIS — C77.1 SECONDARY MALIGNANT NEOPLASM OF INTRATHORACIC LYMPH NODES: Primary | Chronic | ICD-10-CM

## 2023-03-13 PROCEDURE — 1125F PR PAIN SEVERITY QUANTIFIED, PAIN PRESENT: ICD-10-PCS | Mod: CPTII,S$GLB,, | Performed by: RADIOLOGY

## 2023-03-13 PROCEDURE — 3044F HG A1C LEVEL LT 7.0%: CPT | Mod: CPTII,S$GLB,, | Performed by: RADIOLOGY

## 2023-03-13 PROCEDURE — 1157F PR ADVANCE CARE PLAN OR EQUIV PRESENT IN MEDICAL RECORD: ICD-10-PCS | Mod: CPTII,S$GLB,, | Performed by: RADIOLOGY

## 2023-03-13 PROCEDURE — 99999 PR PBB SHADOW E&M-EST. PATIENT-LVL V: ICD-10-PCS | Mod: PBBFAC,,, | Performed by: RADIOLOGY

## 2023-03-13 PROCEDURE — 3044F PR MOST RECENT HEMOGLOBIN A1C LEVEL <7.0%: ICD-10-PCS | Mod: CPTII,S$GLB,, | Performed by: RADIOLOGY

## 2023-03-13 PROCEDURE — 3074F SYST BP LT 130 MM HG: CPT | Mod: CPTII,S$GLB,, | Performed by: RADIOLOGY

## 2023-03-13 PROCEDURE — 1125F AMNT PAIN NOTED PAIN PRSNT: CPT | Mod: CPTII,S$GLB,, | Performed by: RADIOLOGY

## 2023-03-13 PROCEDURE — 3008F BODY MASS INDEX DOCD: CPT | Mod: CPTII,S$GLB,, | Performed by: RADIOLOGY

## 2023-03-13 PROCEDURE — 1159F PR MEDICATION LIST DOCUMENTED IN MEDICAL RECORD: ICD-10-PCS | Mod: CPTII,S$GLB,, | Performed by: RADIOLOGY

## 2023-03-13 PROCEDURE — 3288F FALL RISK ASSESSMENT DOCD: CPT | Mod: CPTII,S$GLB,, | Performed by: RADIOLOGY

## 2023-03-13 PROCEDURE — 71250 CT CHEST WITHOUT CONTRAST: ICD-10-PCS | Mod: 26,,, | Performed by: RADIOLOGY

## 2023-03-13 PROCEDURE — 1159F MED LIST DOCD IN RCRD: CPT | Mod: CPTII,S$GLB,, | Performed by: RADIOLOGY

## 2023-03-13 PROCEDURE — 1101F PT FALLS ASSESS-DOCD LE1/YR: CPT | Mod: CPTII,S$GLB,, | Performed by: RADIOLOGY

## 2023-03-13 PROCEDURE — 3008F PR BODY MASS INDEX (BMI) DOCUMENTED: ICD-10-PCS | Mod: CPTII,S$GLB,, | Performed by: RADIOLOGY

## 2023-03-13 PROCEDURE — 99999 PR PBB SHADOW E&M-EST. PATIENT-LVL V: CPT | Mod: PBBFAC,,, | Performed by: RADIOLOGY

## 2023-03-13 PROCEDURE — 71250 CT THORAX DX C-: CPT | Mod: TC

## 2023-03-13 PROCEDURE — 1101F PR PT FALLS ASSESS DOC 0-1 FALLS W/OUT INJ PAST YR: ICD-10-PCS | Mod: CPTII,S$GLB,, | Performed by: RADIOLOGY

## 2023-03-13 PROCEDURE — 1157F ADVNC CARE PLAN IN RCRD: CPT | Mod: CPTII,S$GLB,, | Performed by: RADIOLOGY

## 2023-03-13 PROCEDURE — 99024 PR POST-OP FOLLOW-UP VISIT: ICD-10-PCS | Mod: S$GLB,,, | Performed by: RADIOLOGY

## 2023-03-13 PROCEDURE — 71250 CT THORAX DX C-: CPT | Mod: 26,,, | Performed by: RADIOLOGY

## 2023-03-13 PROCEDURE — 3288F PR FALLS RISK ASSESSMENT DOCUMENTED: ICD-10-PCS | Mod: CPTII,S$GLB,, | Performed by: RADIOLOGY

## 2023-03-13 PROCEDURE — 3078F PR MOST RECENT DIASTOLIC BLOOD PRESSURE < 80 MM HG: ICD-10-PCS | Mod: CPTII,S$GLB,, | Performed by: RADIOLOGY

## 2023-03-13 PROCEDURE — 3078F DIAST BP <80 MM HG: CPT | Mod: CPTII,S$GLB,, | Performed by: RADIOLOGY

## 2023-03-13 PROCEDURE — 99024 POSTOP FOLLOW-UP VISIT: CPT | Mod: S$GLB,,, | Performed by: RADIOLOGY

## 2023-03-13 PROCEDURE — 3074F PR MOST RECENT SYSTOLIC BLOOD PRESSURE < 130 MM HG: ICD-10-PCS | Mod: CPTII,S$GLB,, | Performed by: RADIOLOGY

## 2023-03-13 NOTE — PROGRESS NOTES
03/13/2023    Radiation Oncology Follow-Up Visit    Prior Radiation History:   Course 1:   Site  Technique  Energy  Dose/Fx (Gy)  #Fx  Total Dose (Gy)  Start Date  End Date  Elapsed Days    RUL Lung - 2 PTV  SBRT  6X  11  5 / 5  55  10/29/2020  11/4/2020  6      Course 2:   Site  Technique  Energy  Dose/Fx (Gy)  #Fx  Total Dose (Gy)  Start Date  End Date  Elapsed Days    JASSI Lung  VMAT  6X  2  23 / 33  46  11/21/2022 1/9/2023  49        Assessment   This is a 72 y.o. y/o female with likely synchronous primary Stage IA2 (cT1b cN0 M0) RUL NSCLC (adeno; no actionable mutations) diagnosed on biopsy of the spiculated nodule 9/14/20. In addition there was a slightly more superior, pleural-based lesion that increased in size and solidity. She was not a candidate for resection due to COPD and CHF. She completed SBRT 55 Gy in 5 fractions to both RUL nodules on 11/4/20. Attempted biopsy of growing, JASSI nodule 9/12/22 was non-diagnostic and resulted in PTX requiring chest tube. Prior PET 7/22/22 demonstrated minimal FDG uptake, but was concerning due to growth and borderline mediastinal adenopathy. CT Chest 10/28/22 demonstrates stable or maybe slight decrease in size of the previously growing JASSI nodule; however, the station 6 para-aortic node continued to increase in size. She elected to proceed with definitive chemo-RT. She only received 46 Gy in 23 fx of a planned 30 fractions. Following her last fraction, she developed C. diff and was admitted to the hospital. We discussed continuing treatment in early February 2023, but she ultimately did not feel up to continuing.     Since end of treatment, she was admitted from 2/13-2/20/23 for acute on chronic diastolic heart failure. Today she reports feeling the best she has felt since start treatment last November. Breathing is back to baseline supplemental O2 requirements. Denies fevers or chest pain.     CT Chest today 3/13/23 demonstrates interval decrease in size of the  Station 6 mediastinal node and stability of JASSI nodule; no evidence of new disease.    At this point I think surveillance is likely her only option, since I'm not sure she would tolerate additional treatment in the future should her cancer recur. Will plan for repeat CT Chest in 3 months; if no changes at that time, will probably go back to a q6 month schedule.         Plan   1) I will see her back in 3 months with CT Chest prior for re-staging.            Chief Complaint   Patient presents with    Follow-up       HPI:     Past Medical History:   Diagnosis Date    Anxiety     Cervical spinal stenosis     Choledocholithiasis     Chronic obstructive pulmonary disease 03/16/2016    Degenerative disc disease of cervical spine     Diverticulosis 04/24/2018    History of alcohol abuse 03/02/2021    History of gastric ulcer     History of L3 compression fracture 12/19/2018    History of lung cancer     s/p completion of XRT in 10/2020    Hyperlipidemia     Iron deficiency anemia     Osteoarthritis     Stage III CKD 2017       Past Surgical History:   Procedure Laterality Date    BREAST CYST EXCISION Right     1967    CATARACT EXTRACTION      COLONOSCOPY  2015    COLONOSCOPY N/A 6/8/2022    Procedure: COLONOSCOPY;  Surgeon: Helio Cheema MD;  Location: [facility] ([address] [place] [address]);  Service: Endoscopy;  Laterality: N/A;  5/25-Pt requesting Dr. Cheema-approval given per Dr. Cheema-ml  Fully vaccinated, prep instr portal -ml    CORONARY ANGIOGRAPHY N/A 7/20/2018    Procedure: ANGIOGRAM, CORONARY ARTERY;  Surgeon: Willard Roberts MD;  Location: [facility] CATH LAB;  Service: Cardiovascular;  Laterality: N/A;    ENDOSCOPIC ULTRASOUND OF UPPER GASTROINTESTINAL TRACT N/A 3/24/2021    Procedure: ULTRASOUND, UPPER GI TRACT, ENDOSCOPIC;  Surgeon: Helio Cheema MD;  Location: [facility] ([address] [place] [address]);  Service: Endoscopy;  Laterality: N/A;    ENDOSCOPIC ULTRASOUND OF UPPER GASTROINTESTINAL TRACT N/A 4/25/2022    Procedure: ULTRASOUND,  UPPER GI TRACT, ENDOSCOPIC;  Surgeon: Helio Cheema MD;  Location: [facility] (2ND FLR);  Service: Endoscopy;  Laterality: N/A;  cardiac risk assessment 1 per Dr. Ortez. see t/e 3/17-SC  3/25:new instructions via portal. home with medical transport?-SC    ERCP N/A 3/24/2021    Procedure: ERCP (ENDOSCOPIC RETROGRADE CHOLANGIOPANCREATOGRAPHY);  Surgeon: Helio Cheema MD;  Location: [facility] ENDO (2ND FLR);  Service: Endoscopy;  Laterality: N/A;    ERCP N/A 4/30/2021    Procedure: ERCP (ENDOSCOPIC RETROGRADE CHOLANGIOPANCREATOGRAPHY);  Surgeon: Boo Gray MD;  Location: [facility] ENDO (2ND FLR);  Service: Endoscopy;  Laterality: N/A;    ERCP N/A 7/1/2021    Procedure: ERCP (ENDOSCOPIC RETROGRADE CHOLANGIOPANCREATOGRAPHY);  Surgeon: Helio Cheema MD;  Location: [facility] (2ND FLR);  Service: Endoscopy;  Laterality: N/A;  Ok for Taxi. Dr Cheema  rapid covid 1130am- tb inst email    ESOPHAGOGASTRODUODENOSCOPY  2015    ESOPHAGOGASTRODUODENOSCOPY N/A 3/4/2021    Procedure: EGD (ESOPHAGOGASTRODUODENOSCOPY);  Surgeon: Yenifer Ramirez MD;  Location: [facility];  Service: Endoscopy;  Laterality: N/A;    ESOPHAGOGASTRODUODENOSCOPY N/A 3/24/2021    Procedure: EGD (ESOPHAGOGASTRODUODENOSCOPY);  Surgeon: Helio Cheema MD;  Location: [facility] (2ND FLR);  Service: Endoscopy;  Laterality: N/A;    EYE SURGERY  2016    Cataracts    FRACTURE SURGERY  2014    JOINT REPLACEMENT  2007    ORIF FEMUR FRACTURE Left     TOTAL KNEE ARTHROPLASTY Left 2007    TUBAL LIGATION         Social History     Tobacco Use    Smoking status: Every Day     Packs/day: 1.00     Years: 53.00     Pack years: 53.00     Types: Cigarettes     Start date: 4/30/1967    Smokeless tobacco: Never    Tobacco comments:     I had quit for about 6 months this past year. Then massive stress and started back up sometimes   Substance Use Topics    Alcohol use: Yes     Alcohol/week: 7.0 standard drinks     Types: 7 Drinks containing 0.5 oz of alcohol per week      Comment: at least 1 cocktail a day- vodka    Drug use: No       Cancer-related family history includes Cancer in her sister; Rectal cancer in her father. There is no history of Melanoma or Breast cancer.    Current Outpatient Medications on File Prior to Visit   Medication Sig Dispense Refill    albuterol (VENTOLIN HFA) 90 mcg/actuation inhaler inhale 1-2 puffs as needed every 6 hours for wheezing and shortness of breath 25.5 g 11    ALPRAZolam (XANAX) 0.25 MG tablet Take 1 tablet (0.25 mg total) by mouth daily as needed for Anxiety. 30 tablet 3    ascorbic acid, vitamin C, (VITAMIN C) 250 MG tablet Take 250 mg by mouth once daily.      atorvastatin (LIPITOR) 40 MG tablet TAKE 1 TABLET BY MOUTH EVERY DAILY 90 tablet 3    azelastine (ASTELIN) 137 mcg (0.1 %) nasal spray Instill 1 spray (137 mcg total) by Nasal route 2 (two) times daily. 90 mL 3    b complex vitamins capsule Take 1 capsule by mouth once daily.      biotin 10 mg Tab Take 10 mg by mouth once daily.      calcium carbonate (OS-SANDRINE) 500 mg calcium (1,250 mg) tablet Take 2 tablets (1,000 mg total) by mouth 2 (two) times daily. 30 tablet 0    cyanocobalamin, vitamin B-12, 1,000 mcg TbSR Take 1,000 mcg by mouth once daily.      denosumab (PROLIA) 60 mg/mL Syrg Inject 1 mL (60 mg total) into the skin every 6 (six) months. 2 mL 0    diltiaZEM (CARDIZEM CD) 180 MG 24 hr capsule Take 1 capsule (180 mg total) by mouth once daily. 30 capsule 1    DULoxetine (CYMBALTA) 60 MG capsule Take 1 capsule (60 mg total) by mouth once daily. 90 capsule 2    ferrous sulfate (FEOSOL) 325 mg (65 mg iron) Tab tablet Take 325 mg by mouth daily with breakfast.      fluticasone (FLONASE) 50 mcg/actuation nasal spray 1 spray by Each Nare route 2 (two) times daily as needed for Rhinitis.      fluticasone propion-salmeterol 115-21 mcg/dose (ADVAIR HFA) 115-21 mcg/actuation HFAA inhaler Inhale 2 puffs into the lungs every 12 (twelve) hours. 36 g 3    gabapentin (NEURONTIN) 300 MG  capsule Take 1 capsule (300 mg total) by mouth 3 (three) times daily. 90 capsule 11    guaiFENesin (MUCINEX) 600 mg 12 hr tablet Take 1,200 mg by mouth 2 (two) times daily as needed for Congestion.      HYDROcodone-acetaminophen (NORCO)  mg per tablet Take 1 tablet by mouth every 12 (twelve) hours as needed for Pain. 60 tablet 0    lutein 40 mg Cap Take by mouth.      magnesium oxide (MAG-OX) 400 mg (241.3 mg magnesium) tablet Take 1 tablet by mouth every 12 (twelve) hours. 30 tablet 0    metoprolol succinate (TOPROL-XL) 25 MG 24 hr tablet Take 2 tablets (50 mg total) by mouth once daily. 60 tablet 1    multivit-min/iron/folic/lutein (CENTRUM SILVER WOMEN ORAL) Take 1 tablet by mouth once daily.      ondansetron (ZOFRAN-ODT) 8 MG TbDL dissolve 1 tablet (8 mg total) by mouth every 8 (eight) hours as needed (nausea). 60 tablet 2    pantoprazole (PROTONIX) 40 MG tablet Take 1 tablet (40 mg total) by mouth once daily. 90 tablet 3    promethazine (PHENERGAN) 25 MG tablet Take 1 tablet (25 mg total) by mouth every 6 (six) hours as needed for Nausea. 60 tablet 3    torsemide (DEMADEX) 20 MG Tab Take 1 tablet (20 mg total) by mouth once daily. Take an extra dose daily for 3 days if gains 2-3 pounds in 2-3 days 180 tablet 3    traZODone (DESYREL) 100 MG tablet Take 1 tablet (100 mg total) by mouth every evening. 30 tablet 2    umeclidinium (INCRUSE ELLIPTA) 62.5 mcg/actuation inhalation capsule Inhale 1 puff into the lungs once daily. Controller 90 each 3    vitamin D (VITAMIN D3) 1000 units Tab Take 1 tablet (1,000 Units total) by mouth once daily. 30 tablet 2    ZINC ORAL Take by mouth.       No current facility-administered medications on file prior to visit.       Review of patient's allergies indicates:   Allergen Reactions    Wellbutrin [bupropion hcl] Other (See Comments)     Hyponatremia and hypomagnesia     Zoloft [sertraline] Other (See Comments)     Hyponatremia and hypomagnesia     Bananas [banana]       "Emesis, and stomach cramps    Patient states she is not allergic to Banana; she cannot eat one everyday       Vital Signs: BP (!) 117/58 (BP Location: Right arm, Patient Position: Sitting)   Pulse 71   Temp 98.1 °F (36.7 °C)   Resp 17   Ht 5' 6" (1.676 m)   Wt 88.5 kg (195 lb)   SpO2 100%   BMI 31.47 kg/m²     ECOG Performance Status: 2 - Ambulates, capable of self care only    Physical Exam  Vitals reviewed.   Constitutional:       Appearance: Normal appearance.      Comments: Frail appearing   HENT:      Head: Normocephalic and atraumatic.   Eyes:      General: No scleral icterus.     Extraocular Movements: Extraocular movements intact.   Pulmonary:      Effort: No accessory muscle usage or respiratory distress.      Comments: +O2 via NC  Abdominal:      General: There is no distension.   Musculoskeletal:         General: Normal range of motion.      Cervical back: Normal range of motion and neck supple.   Lymphadenopathy:      Cervical: No cervical adenopathy.   Skin:     General: Skin is dry.      Coloration: Skin is not jaundiced.   Neurological:      Mental Status: She is alert and oriented to person, place, and time.      Cranial Nerves: No cranial nerve deficit.      Comments: In wheelchair   Psychiatric:         Mood and Affect: Mood and affect normal.         Judgment: Judgment normal.        Labs:    Imaging: I have personally reviewed the patient's available images and reports and summarized pertinent findings above in HPI.     Pathology: No new path              "

## 2023-03-13 NOTE — TELEPHONE ENCOUNTER
Called  Avani to discuss about collecting a blood specimen for Guardant 360 during mid/treatment. Verbalized understanding.

## 2023-03-16 ENCOUNTER — TELEPHONE (OUTPATIENT)
Dept: HEMATOLOGY/ONCOLOGY | Facility: CLINIC | Age: 73
End: 2023-03-16
Payer: MEDICARE

## 2023-03-16 NOTE — TELEPHONE ENCOUNTER
Returned call. Left message.    ----- Message from Emanuel Carter sent at 3/16/2023 10:32 AM CDT -----  Regarding: CALL BACK  .Type: Patient Call Back    Who called: ELIEZER RODRIGUEZ [[id number]]    What is the request in detail: Client stated that the blood sample will be on hold until the lab can obtain certain info.Please contact to further discuss and advise.     Can the clinic reply by [facility]? NO    Would the patient rather a call back or a response via [facility]?  CALL BACK    Best call back number: [phone] [phone]    Additional Information:  N/A

## 2023-03-17 ENCOUNTER — TELEPHONE (OUTPATIENT)
Dept: HEMATOLOGY/ONCOLOGY | Facility: CLINIC | Age: 73
End: 2023-03-17
Payer: MEDICARE

## 2023-03-17 NOTE — TELEPHONE ENCOUNTER
Returned call and answered all questions needed to process the specimen. No other questions.     ----- Message from Meghna Cai MA sent at 3/17/2023 11:04 AM CDT -----    ----- Message -----  From: Kylah Buenrostro  Sent: 3/17/2023  10:55 AM CDT  To: Deirdre Juarez Staff    Name of Who is Calling:Vianca from [facility]              What is the request in detail:Requesting a call back regarding a test and insurance.               Can the clinic reply by [facility]:              What Number to Call Back if not in [facility]:[phone] opt 1   Ref #: [id number]

## 2023-03-20 ENCOUNTER — PATIENT MESSAGE (OUTPATIENT)
Dept: INTERNAL MEDICINE | Facility: CLINIC | Age: 73
End: 2023-03-20
Payer: MEDICARE

## 2023-03-20 DIAGNOSIS — M17.0 PRIMARY OSTEOARTHRITIS OF BOTH KNEES: ICD-10-CM

## 2023-03-20 DIAGNOSIS — G89.4 CHRONIC PAIN SYNDROME: ICD-10-CM

## 2023-03-20 RX ORDER — HYDROCODONE BITARTRATE AND ACETAMINOPHEN 10; 325 MG/1; MG/1
1 TABLET ORAL EVERY 12 HOURS PRN
Qty: 60 TABLET | Refills: 0 | Status: SHIPPED | OUTPATIENT
Start: 2023-03-20 | End: 2023-04-19 | Stop reason: SDUPTHER

## 2023-03-20 NOTE — TELEPHONE ENCOUNTER
Care Due:                  Date            Visit Type   Department     Provider  --------------------------------------------------------------------------------                                EP -                              PRIMARY      [facility] INTERNAL  Last Visit: 03-      CARE ([facility])   MEDICINE       Yane Sahni                              EP -                              PRIMARY      [facility] INTERNAL  Next Visit: 03-      CARE ([facility])   MEDICINE       Yane Sahni                                                            Last  Test          Frequency    Reason                     Performed    Due Date  --------------------------------------------------------------------------------    Lipid Panel.  12 months..  atorvastatin.............  02-   02-    [employer] Embedded Care Gaps. Reference number: [id number]. 3/20/2023   10:14:01 AM CDT

## 2023-03-27 ENCOUNTER — DOCUMENT SCAN (OUTPATIENT)
Dept: HOME HEALTH SERVICES | Facility: HOSPITAL | Age: 73
End: 2023-03-27
Payer: MEDICARE

## 2023-03-27 ENCOUNTER — PATIENT MESSAGE (OUTPATIENT)
Dept: PHARMACY | Facility: CLINIC | Age: 73
End: 2023-03-27
Payer: MEDICARE

## 2023-03-28 ENCOUNTER — SPECIALTY PHARMACY (OUTPATIENT)
Dept: PHARMACY | Facility: CLINIC | Age: 73
End: 2023-03-28
Payer: MEDICARE

## 2023-03-28 NOTE — TELEPHONE ENCOUNTER
Kunal confirmed with Mireille via teams to send on 3/30 for appt on 3/31 to:    Attn: Mireille Parkinson MA  [address]  [address]  [facility] [address]

## 2023-03-28 NOTE — TELEPHONE ENCOUNTER
Specialty Pharmacy - Refill Coordination    Specialty Medication Orders Linked to Encounter      Flowsheet Row Most Recent Value   Medication #1 denosumab (PROLIA) 60 mg/mL Syrg (Order#[id number], Rx#[id number])         Pending  set-up- secured efra Kendrick in providers office.    Refill Questions - Documented Responses      Flowsheet Row Most Recent Value   Patient Availability and HIPAA Verification    Does patient want to proceed with activity? Yes   HIPAA/medical authority confirmed? Yes   Relationship to patient of person spoken to? Self   Refill Screening Questions    Changes to allergies? No   Changes to medications? No   New conditions since last clinic visit? No   Unplanned office visit, urgent care, ED, or hospital admission in the last 4 weeks? No   How does patient/caregiver feel medication is working? Good   Financial problems or insurance changes? No   How many doses of your specialty medications were missed in the last 4 weeks? 0   Would patient like to speak to a pharmacist? No   When does the patient need to receive the medication? 03/31/23   Refill Delivery Questions    How will the patient receive the medication?    When does the patient need to receive the medication? 03/31/23   Shipping Address Temporary   Address in [facility] confirmed and updated if neccessary? Yes   Expected Copay ($) 0   Is the patient able to afford the medication copay? Yes   Payment Method zero copay   Days supply of Refill 180   Supplies needed? No supplies needed   Refill activity completed? Yes   Refill activity plan Refill scheduled   Shipment/Pickup Date: 03/30/23            Current Outpatient Medications   Medication Sig    albuterol (VENTOLIN HFA) 90 mcg/actuation inhaler inhale 1-2 puffs as needed every 6 hours for wheezing and shortness of breath    ALPRAZolam (XANAX) 0.25 MG tablet Take 1 tablet (0.25 mg total) by mouth daily as needed for Anxiety.    ascorbic acid, vitamin C, (VITAMIN  C) 250 MG tablet Take 250 mg by mouth once daily.    atorvastatin (LIPITOR) 40 MG tablet TAKE 1 TABLET BY MOUTH EVERY DAILY    azelastine (ASTELIN) 137 mcg (0.1 %) nasal spray Instill 1 spray (137 mcg total) by Nasal route 2 (two) times daily.    b complex vitamins capsule Take 1 capsule by mouth once daily.    biotin 10 mg Tab Take 10 mg by mouth once daily.    calcium carbonate (OS-SANDRINE) 500 mg calcium (1,250 mg) tablet Take 2 tablets (1,000 mg total) by mouth 2 (two) times daily.    cyanocobalamin, vitamin B-12, 1,000 mcg TbSR Take 1,000 mcg by mouth once daily.    denosumab (PROLIA) 60 mg/mL Syrg Inject 1 mL (60 mg total) into the skin every 6 (six) months.    diltiaZEM (CARDIZEM CD) 180 MG 24 hr capsule Take 1 capsule (180 mg total) by mouth once daily.    DULoxetine (CYMBALTA) 60 MG capsule Take 1 capsule (60 mg total) by mouth once daily.    ferrous sulfate (FEOSOL) 325 mg (65 mg iron) Tab tablet Take 325 mg by mouth daily with breakfast.    fluticasone (FLONASE) 50 mcg/actuation nasal spray 1 spray by Each Nare route 2 (two) times daily as needed for Rhinitis.    fluticasone propion-salmeterol 115-21 mcg/dose (ADVAIR HFA) 115-21 mcg/actuation HFAA inhaler Inhale 2 puffs into the lungs every 12 (twelve) hours.    gabapentin (NEURONTIN) 300 MG capsule Take 1 capsule (300 mg total) by mouth 3 (three) times daily.    guaiFENesin (MUCINEX) 600 mg 12 hr tablet Take 1,200 mg by mouth 2 (two) times daily as needed for Congestion.    HYDROcodone-acetaminophen (NORCO)  mg per tablet Take 1 tablet by mouth every 12 (twelve) hours as needed for Pain.    lutein 40 mg Cap Take by mouth.    magnesium oxide (MAG-OX) 400 mg (241.3 mg magnesium) tablet Take 1 tablet by mouth every 12 (twelve) hours.    metoprolol succinate (TOPROL-XL) 25 MG 24 hr tablet Take 2 tablets (50 mg total) by mouth once daily.    multivit-min/iron/folic/lutein (CENTRUM SILVER WOMEN ORAL) Take 1 tablet by mouth once daily.    ondansetron  (ZOFRAN-ODT) 8 MG TbDL dissolve 1 tablet (8 mg total) by mouth every 8 (eight) hours as needed (nausea).    pantoprazole (PROTONIX) 40 MG tablet Take 1 tablet (40 mg total) by mouth once daily.    promethazine (PHENERGAN) 25 MG tablet Take 1 tablet (25 mg total) by mouth every 6 (six) hours as needed for Nausea.    torsemide (DEMADEX) 20 MG Tab Take 1 tablet (20 mg total) by mouth once daily. Take an extra dose daily for 3 days if gains 2-3 pounds in 2-3 days    traZODone (DESYREL) 100 MG tablet Take 1 tablet (100 mg total) by mouth every evening.    umeclidinium (INCRUSE ELLIPTA) 62.5 mcg/actuation inhalation capsule Inhale 1 puff into the lungs once daily. Controller    vitamin D (VITAMIN D3) 1000 units Tab Take 1 tablet (1,000 Units total) by mouth once daily.    ZINC ORAL Take by mouth.   Last reviewed on 3/27/2023 11:12 AM by Yane Sahni MD    Review of patient's allergies indicates:   Allergen Reactions    Wellbutrin [bupropion hcl] Other (See Comments)     Hyponatremia and hypomagnesia     Zoloft [sertraline] Other (See Comments)     Hyponatremia and hypomagnesia     Bananas [banana]      Emesis, and stomach cramps    Patient states she is not allergic to Banana; she cannot eat one everyday    Last reviewed on  3/27/2023 11:12 AM by Yane Sahni      Tasks added this encounter   9/10/2023 - Refill Call (Auto Added)  3/28/2023 -  Setup Confirmation   Tasks due within next 3 months   No tasks due.     June Khoury, PharmD  [facility]  [address] [place] [id number]  Phone: [phone]  Fax: [phone]

## 2023-04-18 ENCOUNTER — DOCUMENT SCAN (OUTPATIENT)
Dept: HOME HEALTH SERVICES | Facility: HOSPITAL | Age: 73
End: 2023-04-18
Payer: MEDICARE

## 2023-04-19 ENCOUNTER — PATIENT MESSAGE (OUTPATIENT)
Dept: INTERNAL MEDICINE | Facility: CLINIC | Age: 73
End: 2023-04-19
Payer: MEDICARE

## 2023-04-19 DIAGNOSIS — G89.4 CHRONIC PAIN SYNDROME: ICD-10-CM

## 2023-04-19 DIAGNOSIS — M17.0 PRIMARY OSTEOARTHRITIS OF BOTH KNEES: ICD-10-CM

## 2023-04-19 DIAGNOSIS — I10 HYPERTENSION, BENIGN: ICD-10-CM

## 2023-04-19 RX ORDER — DILTIAZEM HYDROCHLORIDE 180 MG/1
180 CAPSULE, COATED, EXTENDED RELEASE ORAL DAILY
Qty: 90 CAPSULE | Refills: 3 | Status: ON HOLD | OUTPATIENT
Start: 2023-04-19 | End: 2023-06-15

## 2023-04-19 RX ORDER — METOPROLOL SUCCINATE 25 MG/1
50 TABLET, EXTENDED RELEASE ORAL DAILY
Qty: 180 TABLET | Refills: 3 | Status: ON HOLD | OUTPATIENT
Start: 2023-04-19 | End: 2023-06-15

## 2023-04-19 RX ORDER — HYDROCODONE BITARTRATE AND ACETAMINOPHEN 10; 325 MG/1; MG/1
1 TABLET ORAL EVERY 12 HOURS PRN
Qty: 60 TABLET | Refills: 0 | Status: SHIPPED | OUTPATIENT
Start: 2023-04-19 | End: 2023-05-25 | Stop reason: SDUPTHER

## 2023-04-19 NOTE — TELEPHONE ENCOUNTER
No new care gaps identified.  [facility] Embedded Care Gaps. Reference number: [id number]. 4/19/2023   3:14:18 PM CDT

## 2023-04-20 ENCOUNTER — OFFICE VISIT (OUTPATIENT)
Dept: GASTROENTEROLOGY | Facility: CLINIC | Age: 73
End: 2023-04-20
Payer: MEDICARE

## 2023-04-20 VITALS
BODY MASS INDEX: 31 KG/M2 | HEART RATE: 88 BPM | SYSTOLIC BLOOD PRESSURE: 112 MMHG | HEIGHT: 67 IN | DIASTOLIC BLOOD PRESSURE: 58 MMHG

## 2023-04-20 DIAGNOSIS — K86.2 PANCREAS CYST: Primary | ICD-10-CM

## 2023-04-20 PROCEDURE — 99212 PR OFFICE/OUTPT VISIT, EST, LEVL II, 10-19 MIN: ICD-10-PCS | Mod: S$GLB,,, | Performed by: INTERNAL MEDICINE

## 2023-04-20 PROCEDURE — 3044F HG A1C LEVEL LT 7.0%: CPT | Mod: CPTII,S$GLB,, | Performed by: INTERNAL MEDICINE

## 2023-04-20 PROCEDURE — 99499 RISK ADDL DX/OHS AUDIT: ICD-10-PCS | Mod: S$GLB,,, | Performed by: INTERNAL MEDICINE

## 2023-04-20 PROCEDURE — 1159F MED LIST DOCD IN RCRD: CPT | Mod: CPTII,S$GLB,, | Performed by: INTERNAL MEDICINE

## 2023-04-20 PROCEDURE — 99999 PR PBB SHADOW E&M-EST. PATIENT-LVL IV: ICD-10-PCS | Mod: PBBFAC,,, | Performed by: INTERNAL MEDICINE

## 2023-04-20 PROCEDURE — 3078F PR MOST RECENT DIASTOLIC BLOOD PRESSURE < 80 MM HG: ICD-10-PCS | Mod: CPTII,S$GLB,, | Performed by: INTERNAL MEDICINE

## 2023-04-20 PROCEDURE — 99499 UNLISTED E&M SERVICE: CPT | Mod: S$GLB,,, | Performed by: INTERNAL MEDICINE

## 2023-04-20 PROCEDURE — 1160F PR REVIEW ALL MEDS BY PRESCRIBER/CLIN PHARMACIST DOCUMENTED: ICD-10-PCS | Mod: CPTII,S$GLB,, | Performed by: INTERNAL MEDICINE

## 2023-04-20 PROCEDURE — 99212 OFFICE O/P EST SF 10 MIN: CPT | Mod: S$GLB,,, | Performed by: INTERNAL MEDICINE

## 2023-04-20 PROCEDURE — 3008F BODY MASS INDEX DOCD: CPT | Mod: CPTII,S$GLB,, | Performed by: INTERNAL MEDICINE

## 2023-04-20 PROCEDURE — 3078F DIAST BP <80 MM HG: CPT | Mod: CPTII,S$GLB,, | Performed by: INTERNAL MEDICINE

## 2023-04-20 PROCEDURE — 1160F RVW MEDS BY RX/DR IN RCRD: CPT | Mod: CPTII,S$GLB,, | Performed by: INTERNAL MEDICINE

## 2023-04-20 PROCEDURE — 3288F PR FALLS RISK ASSESSMENT DOCUMENTED: ICD-10-PCS | Mod: CPTII,S$GLB,, | Performed by: INTERNAL MEDICINE

## 2023-04-20 PROCEDURE — 1126F AMNT PAIN NOTED NONE PRSNT: CPT | Mod: CPTII,S$GLB,, | Performed by: INTERNAL MEDICINE

## 2023-04-20 PROCEDURE — 99999 PR PBB SHADOW E&M-EST. PATIENT-LVL IV: CPT | Mod: PBBFAC,,, | Performed by: INTERNAL MEDICINE

## 2023-04-20 PROCEDURE — 1101F PT FALLS ASSESS-DOCD LE1/YR: CPT | Mod: CPTII,S$GLB,, | Performed by: INTERNAL MEDICINE

## 2023-04-20 PROCEDURE — 3074F SYST BP LT 130 MM HG: CPT | Mod: CPTII,S$GLB,, | Performed by: INTERNAL MEDICINE

## 2023-04-20 PROCEDURE — 3074F PR MOST RECENT SYSTOLIC BLOOD PRESSURE < 130 MM HG: ICD-10-PCS | Mod: CPTII,S$GLB,, | Performed by: INTERNAL MEDICINE

## 2023-04-20 PROCEDURE — 1126F PR PAIN SEVERITY QUANTIFIED, NO PAIN PRESENT: ICD-10-PCS | Mod: CPTII,S$GLB,, | Performed by: INTERNAL MEDICINE

## 2023-04-20 PROCEDURE — 3044F PR MOST RECENT HEMOGLOBIN A1C LEVEL <7.0%: ICD-10-PCS | Mod: CPTII,S$GLB,, | Performed by: INTERNAL MEDICINE

## 2023-04-20 PROCEDURE — 1101F PR PT FALLS ASSESS DOC 0-1 FALLS W/OUT INJ PAST YR: ICD-10-PCS | Mod: CPTII,S$GLB,, | Performed by: INTERNAL MEDICINE

## 2023-04-20 PROCEDURE — 1159F PR MEDICATION LIST DOCUMENTED IN MEDICAL RECORD: ICD-10-PCS | Mod: CPTII,S$GLB,, | Performed by: INTERNAL MEDICINE

## 2023-04-20 PROCEDURE — 1157F PR ADVANCE CARE PLAN OR EQUIV PRESENT IN MEDICAL RECORD: ICD-10-PCS | Mod: CPTII,S$GLB,, | Performed by: INTERNAL MEDICINE

## 2023-04-20 PROCEDURE — 3008F PR BODY MASS INDEX (BMI) DOCUMENTED: ICD-10-PCS | Mod: CPTII,S$GLB,, | Performed by: INTERNAL MEDICINE

## 2023-04-20 PROCEDURE — 1157F ADVNC CARE PLAN IN RCRD: CPT | Mod: CPTII,S$GLB,, | Performed by: INTERNAL MEDICINE

## 2023-04-20 PROCEDURE — 3288F FALL RISK ASSESSMENT DOCD: CPT | Mod: CPTII,S$GLB,, | Performed by: INTERNAL MEDICINE

## 2023-04-20 NOTE — PROGRESS NOTES
Gastroenterology: [facility]      SUBJECTIVE:         Chief Complaint: Here for evaluation of a pancreatic cyst     History of Present Illness:  Patient is a 72 y.o. female presents with a pancreatic cyst. The cyst was first noted 2021. It's located in the body.  It measures 9.6 m mm in size on the most recent imaging.  It is not symptomatic. Previous studies include endoscopic US.   Since initial detection, the cyst has not increased in size. The pancreatic duct is not dilated.    Previous FNA of the cyst has not been done    Prior Imaging:        Personal/family factors:    There is not a family history of pancreatic cancer     The patient does smoke.    ECOG status 2 - Symptomatic, <50% confined to bed        Review of Systems   Respiratory: positive cough or shortness of breath       Past Medical History:   Diagnosis Date    Anxiety     Cervical spinal stenosis     Choledocholithiasis     Chronic obstructive pulmonary disease 03/16/2016    Degenerative disc disease of cervical spine     Diverticulosis 04/24/2018    History of alcohol abuse 03/02/2021    History of gastric ulcer     History of L3 compression fracture 12/19/2018    History of lung cancer     s/p completion of XRT in 10/2020    Hyperlipidemia     Iron deficiency anemia     Osteoarthritis     Stage III CKD 2017       Past Surgical History:   Procedure Laterality Date    BREAST CYST EXCISION Right     1967    CATARACT EXTRACTION      COLONOSCOPY  2015    COLONOSCOPY N/A 6/8/2022    Procedure: COLONOSCOPY;  Surgeon: Helio Cheema MD;  Location: [facility] ([address] [place] [address]);  Service: Endoscopy;  Laterality: N/A;  5/25-Pt requesting Dr. Cheema-approval given per Dr. Cheema-ml  Fully vaccinated, prep instr portal -ml    CORONARY ANGIOGRAPHY N/A 7/20/2018    Procedure: ANGIOGRAM, CORONARY ARTERY;  Surgeon: Willard Roberts MD;  Location: [facility] CATH LAB;  Service: Cardiovascular;  Laterality: N/A;    ENDOSCOPIC ULTRASOUND OF UPPER  GASTROINTESTINAL TRACT N/A 3/24/2021    Procedure: ULTRASOUND, UPPER GI TRACT, ENDOSCOPIC;  Surgeon: Helio Cheema MD;  Location: [facility] ENDO (2ND FLR);  Service: Endoscopy;  Laterality: N/A;    ENDOSCOPIC ULTRASOUND OF UPPER GASTROINTESTINAL TRACT N/A 4/25/2022    Procedure: ULTRASOUND, UPPER GI TRACT, ENDOSCOPIC;  Surgeon: Helio Cheema MD;  Location: [facility] ENDO (2ND FLR);  Service: Endoscopy;  Laterality: N/A;  cardiac risk assessment 1 per Dr. Ortez. see t/e 3/17-SC  3/25:new instructions via portal. home with medical transport?-SC    ERCP N/A 3/24/2021    Procedure: ERCP (ENDOSCOPIC RETROGRADE CHOLANGIOPANCREATOGRAPHY);  Surgeon: Helio Cheema MD;  Location: [facility] ENDO (2ND FLR);  Service: Endoscopy;  Laterality: N/A;    ERCP N/A 4/30/2021    Procedure: ERCP (ENDOSCOPIC RETROGRADE CHOLANGIOPANCREATOGRAPHY);  Surgeon: Boo Gray MD;  Location: [facility] ENDO (2ND FLR);  Service: Endoscopy;  Laterality: N/A;    ERCP N/A 7/1/2021    Procedure: ERCP (ENDOSCOPIC RETROGRADE CHOLANGIOPANCREATOGRAPHY);  Surgeon: Helio Cheema MD;  Location: [facility] ENDO (2ND FLR);  Service: Endoscopy;  Laterality: N/A;  Ok for Taxi. Dr Cheema  rapid covid 1130am- tb inst email    ESOPHAGOGASTRODUODENOSCOPY  2015    ESOPHAGOGASTRODUODENOSCOPY N/A 3/4/2021    Procedure: EGD (ESOPHAGOGASTRODUODENOSCOPY);  Surgeon: Yenifer Ramirez MD;  Location: [facility];  Service: Endoscopy;  Laterality: N/A;    ESOPHAGOGASTRODUODENOSCOPY N/A 3/24/2021    Procedure: EGD (ESOPHAGOGASTRODUODENOSCOPY);  Surgeon: Helio Cheema MD;  Location: [facility] ENDO (2ND FLR);  Service: Endoscopy;  Laterality: N/A;    EYE SURGERY  2016    Cataracts    FRACTURE SURGERY  2014    JOINT REPLACEMENT  2007    ORIF FEMUR FRACTURE Left     TOTAL KNEE ARTHROPLASTY Left 2007    TUBAL LIGATION         Family History   Problem Relation Age of Onset    Hypertension Mother     Alzheimer's disease Mother     Dementia Mother     Depression Mother         Alzheimers     Myasthenia gravis Father     Arthritis Father         Myasthenia Gravis    Rectal cancer Father     Hypertension Brother     Arthritis Brother         Colon cancer, obese, high blood pressure    Colon cancer Brother     No Known Problems Son     Cancer Sister         Brain cancer    Miscarriages / Stillbirths Sister     Melanoma Neg Hx     Breast cancer Neg Hx        Social History     Socioeconomic History    Marital status: Single    Number of children: 2   Occupational History    Occupation: unemployed   Tobacco Use    Smoking status: Every Day     Packs/day: 1.00     Years: 53.00     Pack years: 53.00     Types: Cigarettes     Start date: 4/30/1967    Smokeless tobacco: Never    Tobacco comments:     I had quit for about 6 months this past year. Then massive stress and started back up sometimes   Substance and Sexual Activity    Alcohol use: Yes     Alcohol/week: 7.0 standard drinks     Types: 7 Drinks containing 0.5 oz of alcohol per week     Comment: at least 1 cocktail a day- vodka    Drug use: No    Sexual activity: Not Currently     Partners: Male     Birth control/protection: Abstinence, Post-menopausal   Other Topics Concern    Are you pregnant or think you may be? No   Social History Narrative    Originally from [facility] in [facility] since 1998    [facility] in [facility]     Social Determinants of Health     Financial Resource Strain: Medium Risk    Difficulty of Paying Living Expenses: Somewhat hard   Food Insecurity: Food Insecurity Present    Worried About Running Out of Food in the Last Year: Sometimes true    Ran Out of Food in the Last Year: Sometimes true   Transportation Needs: Unmet Transportation Needs    Lack of Transportation (Medical): Yes    Lack of Transportation (Non-Medical): Yes   Physical Activity: Insufficiently Active    Days of Exercise per Week: 4 days    Minutes of Exercise per Session: 30 min   Stress: Stress Concern Present    Feeling of Stress : To some extent   Social  Connections: Socially Isolated    Frequency of Communication with Friends and Family: Three times a week    Frequency of Social Gatherings with Friends and Family: Three times a week    Attends Congregation Services: Never    Active Member of Clubs or Organizations: No    Attends Club or Organization Meetings: Patient refused    Marital Status:    Housing Stability: High Risk    Unable to Pay for Housing in the Last Year: Yes    Number of Places Lived in the Last Year: 5    Unstable Housing in the Last Year: Yes       Current Outpatient Medications on File Prior to Visit   Medication Sig Dispense Refill    albuterol (VENTOLIN HFA) 90 mcg/actuation inhaler inhale 1-2 puffs as needed every 6 hours for wheezing and shortness of breath 25.5 g 11    ALPRAZolam (XANAX) 0.25 MG tablet Take 1 tablet (0.25 mg total) by mouth daily as needed for Anxiety. 30 tablet 3    ascorbic acid, vitamin C, (VITAMIN C) 250 MG tablet Take 250 mg by mouth once daily.      atorvastatin (LIPITOR) 40 MG tablet TAKE 1 TABLET BY MOUTH EVERY DAILY 90 tablet 3    azelastine (ASTELIN) 137 mcg (0.1 %) nasal spray Instill 1 spray (137 mcg total) by Nasal route 2 (two) times daily. 90 mL 3    b complex vitamins capsule Take 1 capsule by mouth once daily.      biotin 10 mg Tab Take 10 mg by mouth once daily.      cyanocobalamin, vitamin B-12, 1,000 mcg TbSR Take 1,000 mcg by mouth once daily.      denosumab (PROLIA) 60 mg/mL Syrg Inject 1 mL (60 mg total) into the skin every 6 (six) months. 2 mL 0    diltiaZEM (CARDIZEM CD) 180 MG 24 hr capsule Take 1 capsule (180 mg total) by mouth once daily. 90 capsule 3    DULoxetine (CYMBALTA) 60 MG capsule Take 1 capsule (60 mg total) by mouth once daily. 90 capsule 2    ferrous sulfate (FEOSOL) 325 mg (65 mg iron) Tab tablet Take 325 mg by mouth daily with breakfast.      fluticasone (FLONASE) 50 mcg/actuation nasal spray 1 spray by Each Nare route 2 (two) times daily as needed for Rhinitis.       fluticasone propion-salmeterol 115-21 mcg/dose (ADVAIR HFA) 115-21 mcg/actuation HFAA inhaler Inhale 2 puffs into the lungs every 12 (twelve) hours. 36 g 3    gabapentin (NEURONTIN) 300 MG capsule Take 1 capsule (300 mg total) by mouth 3 (three) times daily. 90 capsule 11    guaiFENesin (MUCINEX) 600 mg 12 hr tablet Take 1,200 mg by mouth 2 (two) times daily as needed for Congestion.      HYDROcodone-acetaminophen (NORCO)  mg per tablet Take 1 tablet by mouth every 12 (twelve) hours as needed for Pain. 60 tablet 0    lutein 40 mg Cap Take by mouth.      magnesium oxide (MAG-OX) 400 mg (241.3 mg magnesium) tablet Take 1 tablet by mouth every 12 (twelve) hours. 30 tablet 0    metoprolol succinate (TOPROL-XL) 25 MG 24 hr tablet Take 2 tablets (50 mg total) by mouth once daily. 180 tablet 3    multivit-min/iron/folic/lutein (CENTRUM SILVER WOMEN ORAL) Take 1 tablet by mouth once daily.      ondansetron (ZOFRAN-ODT) 8 MG TbDL dissolve 1 tablet (8 mg total) by mouth every 8 (eight) hours as needed (nausea). 60 tablet 2    pantoprazole (PROTONIX) 40 MG tablet Take 1 tablet (40 mg total) by mouth once daily. 90 tablet 3    promethazine (PHENERGAN) 25 MG tablet Take 1 tablet (25 mg total) by mouth every 6 (six) hours as needed for Nausea. 60 tablet 3    torsemide (DEMADEX) 20 MG Tab Take 1 tablet (20 mg total) by mouth once daily. Take an extra dose daily for 3 days if gains 2-3 pounds in 2-3 days 180 tablet 3    traZODone (DESYREL) 100 MG tablet Take 1 tablet (100 mg total) by mouth every evening. 30 tablet 2    umeclidinium (INCRUSE ELLIPTA) 62.5 mcg/actuation inhalation capsule Inhale 1 puff into the lungs once daily. Controller 90 each 3    vitamin D (VITAMIN D3) 1000 units Tab Take 1 tablet (1,000 Units total) by mouth once daily. 30 tablet 2    ZINC ORAL Take by mouth.      calcium carbonate (OS-SANDRINE) 500 mg calcium (1,250 mg) tablet Take 2 tablets (1,000 mg total) by mouth 2 (two) times daily. 30 tablet 0     No  current facility-administered medications on file prior to visit.       Review of patient's allergies indicates:   Allergen Reactions    Wellbutrin [bupropion hcl] Other (See Comments)     Hyponatremia and hypomagnesia     Zoloft [sertraline] Other (See Comments)     Hyponatremia and hypomagnesia     Bananas [banana]      Emesis, and stomach cramps    Patient states she is not allergic to Banana; she cannot eat one everyday       Physical Exam:  General: Well-developed, well-appearing, no acute distress        Laboratory:   Lab Results   Component Value Date    ALT 11 03/06/2023    AST 13 03/06/2023    ALKPHOS 91 03/06/2023    BILITOT 0.5 03/06/2023     Lab Results   Component Value Date    WBC 7.62 03/06/2023    HGB 8.3 (L) 03/06/2023    HCT 25.9 (L) 03/06/2023     (H) 03/06/2023     03/06/2023     Lab Results   Component Value Date    INR 1.0 09/12/2022    INR 1.1 04/30/2021    INR 1.1 04/29/2021           Diagnostic/Imaging Results:  As above    ASSESSMENT/PLAN:     Pancreatic cyst in the body. Measuring 9.6 mm in size,  unchanged  Most likely etiology at this point is a IPMN      Plan:     Pancreatic cyst- Given her comorbidities and unlikely he pancreas cyst will transform in cancer and if cancer developed high surgical and oncological therapy risk we will recommend not to proceed with further surveillance. All questions answer and the patient agree             Helio Cheema MD  [facility]  Department of Gastroenterology

## 2023-04-24 PROBLEM — N18.30 ACUTE RENAL FAILURE SUPERIMPOSED ON STAGE 3 CHRONIC KIDNEY DISEASE: Status: RESOLVED | Noted: 2022-12-23 | Resolved: 2023-04-24

## 2023-04-24 PROBLEM — N17.9 ACUTE RENAL FAILURE SUPERIMPOSED ON STAGE 3 CHRONIC KIDNEY DISEASE: Status: RESOLVED | Noted: 2022-12-23 | Resolved: 2023-04-24

## 2023-04-26 ENCOUNTER — DOCUMENT SCAN (OUTPATIENT)
Dept: HOME HEALTH SERVICES | Facility: HOSPITAL | Age: 73
End: 2023-04-26
Payer: MEDICARE

## 2023-05-02 ENCOUNTER — TELEPHONE (OUTPATIENT)
Dept: INTERNAL MEDICINE | Facility: CLINIC | Age: 73
End: 2023-05-02
Payer: MEDICARE

## 2023-05-05 ENCOUNTER — TELEPHONE (OUTPATIENT)
Dept: ENDOSCOPY | Facility: HOSPITAL | Age: 73
End: 2023-05-05
Payer: MEDICARE

## 2023-05-11 RX ORDER — TRAZODONE HYDROCHLORIDE 100 MG/1
100 TABLET ORAL NIGHTLY
Qty: 30 TABLET | Refills: 2 | Status: SHIPPED | OUTPATIENT
Start: 2023-05-11 | End: 2023-05-22 | Stop reason: SDUPTHER

## 2023-05-18 ENCOUNTER — DOCUMENTATION ONLY (OUTPATIENT)
Dept: HEMATOLOGY/ONCOLOGY | Facility: CLINIC | Age: 73
End: 2023-05-18
Payer: MEDICARE

## 2023-05-18 NOTE — PROGRESS NOTES
SW received call from patient. She explained that she needs a ride for tomorrow, 1p pickup. SW explained the drastic cut in transportation funding. Patient explained that she just needs a ride for blood draw tomorrow and follow up with Dr. Gilmore next Thursday. SW again explained concern that patients may not be able to receive any transportation near the end of the penny year. Patient can no longer use insurance provided transport because she cannot enter the van. SW inquired about RTA. She has the forms needed but requires an MD to complete some of the paperwork. MAURY explained Dr. Gilomre could complete this but patient wanted PCP to complete. MAURY asked Dr. Gilmore and staff where paperwork could be sent so they could sign.    MAURY arranged [facility] for 1p pickup [facility].    SW messaged Dr. Gilmore and staff if follow up visit could be arranged as telehealth to decrease the cost of transport.

## 2023-05-19 ENCOUNTER — TELEPHONE (OUTPATIENT)
Dept: HEMATOLOGY/ONCOLOGY | Facility: CLINIC | Age: 73
End: 2023-05-19
Payer: MEDICARE

## 2023-05-19 ENCOUNTER — DOCUMENTATION ONLY (OUTPATIENT)
Dept: HEMATOLOGY/ONCOLOGY | Facility: CLINIC | Age: 73
End: 2023-05-19
Payer: MEDICARE

## 2023-05-19 NOTE — TELEPHONE ENCOUNTER
Spoke to pt and let her know that the choice was completely up to her on whether she would like to get her labs done via home health or come to [facility]. She stated that home health have a hard time finding veins and that they send labs off to [facility] which takes a while to get resulted. I also offered to change pts in clinic visit to virtual just to help her with transportation costs, pt stated that she would rather come in person to speak with Dr. Gilmore.       Meghna OLIVIER

## 2023-05-19 NOTE — TELEPHONE ENCOUNTER
----- Message from Caitlin Romero sent at 5/19/2023 12:40 PM CDT -----  Regarding: lab work  Name of caller: lory       What is the requesting detail: pt is requesting a call back to find out if she can schedule to have her blood work done on the same day as her appt. She is having trouble with transportation. Please advise       Can the clinic reply by [facility]:       What number to call back:[phone]

## 2023-05-19 NOTE — PROGRESS NOTES
Patient called, she is cancelling her appointment today. She will attempt to reschedule and will reach out to Dr. Gilmore.    MAURY left a VM with Eulogio to cancel the ride (30 min prior to pickup) and emailed Lucila as well.    MAURY notified Dr. Gilmore.

## 2023-05-19 NOTE — TELEPHONE ENCOUNTER
----- Message from Crissy Cameron sent at 5/19/2023  1:35 PM CDT -----      Name of Who is Calling: ELIEZER RODRIGUEZ [[id number]]      What is the request in detail: Pt called to speak with Meghna.Please contact to further discuss and advise.          Can the clinic reply by [facility]: Y      What Number to Call Back if not in [facility]: [phone]

## 2023-05-19 NOTE — TELEPHONE ENCOUNTER
Left VM for pt to let her know that we are trying to see if she can have her labs drawn by home health. I told her once we get any updates either myself or Cornelio will reach out to her.     Meghna OLIVIER

## 2023-05-22 ENCOUNTER — OFFICE VISIT (OUTPATIENT)
Dept: PSYCHIATRY | Facility: CLINIC | Age: 73
End: 2023-05-22
Payer: MEDICARE

## 2023-05-22 DIAGNOSIS — F33.1 MDD (MAJOR DEPRESSIVE DISORDER), RECURRENT EPISODE, MODERATE: Primary | ICD-10-CM

## 2023-05-22 DIAGNOSIS — F41.1 GENERALIZED ANXIETY DISORDER: ICD-10-CM

## 2023-05-22 DIAGNOSIS — E55.9 VITAMIN D DEFICIENCY: ICD-10-CM

## 2023-05-22 DIAGNOSIS — F43.10 PTSD (POST-TRAUMATIC STRESS DISORDER): ICD-10-CM

## 2023-05-22 PROBLEM — J96.21 ACUTE ON CHRONIC RESPIRATORY FAILURE WITH HYPOXIA AND HYPERCAPNIA: Status: RESOLVED | Noted: 2021-05-13 | Resolved: 2023-05-22

## 2023-05-22 PROBLEM — J96.22 ACUTE ON CHRONIC RESPIRATORY FAILURE WITH HYPOXIA AND HYPERCAPNIA: Status: RESOLVED | Noted: 2021-05-13 | Resolved: 2023-05-22

## 2023-05-22 PROCEDURE — 3044F PR MOST RECENT HEMOGLOBIN A1C LEVEL <7.0%: ICD-10-PCS | Mod: CPTII,95,, | Performed by: PHYSICIAN ASSISTANT

## 2023-05-22 PROCEDURE — 99214 PR OFFICE/OUTPT VISIT, EST, LEVL IV, 30-39 MIN: ICD-10-PCS | Mod: 95,,, | Performed by: PHYSICIAN ASSISTANT

## 2023-05-22 PROCEDURE — 90833 PR PSYCHOTHERAPY W/PATIENT W/E&M, 30 MIN (ADD ON): ICD-10-PCS | Mod: 95,,, | Performed by: PHYSICIAN ASSISTANT

## 2023-05-22 PROCEDURE — 1157F ADVNC CARE PLAN IN RCRD: CPT | Mod: CPTII,95,, | Performed by: PHYSICIAN ASSISTANT

## 2023-05-22 PROCEDURE — 1160F PR REVIEW ALL MEDS BY PRESCRIBER/CLIN PHARMACIST DOCUMENTED: ICD-10-PCS | Mod: CPTII,95,, | Performed by: PHYSICIAN ASSISTANT

## 2023-05-22 PROCEDURE — 1159F MED LIST DOCD IN RCRD: CPT | Mod: CPTII,95,, | Performed by: PHYSICIAN ASSISTANT

## 2023-05-22 PROCEDURE — 90833 PSYTX W PT W E/M 30 MIN: CPT | Mod: 95,,, | Performed by: PHYSICIAN ASSISTANT

## 2023-05-22 PROCEDURE — 99214 OFFICE O/P EST MOD 30 MIN: CPT | Mod: 95,,, | Performed by: PHYSICIAN ASSISTANT

## 2023-05-22 PROCEDURE — 1157F PR ADVANCE CARE PLAN OR EQUIV PRESENT IN MEDICAL RECORD: ICD-10-PCS | Mod: CPTII,95,, | Performed by: PHYSICIAN ASSISTANT

## 2023-05-22 PROCEDURE — 1159F PR MEDICATION LIST DOCUMENTED IN MEDICAL RECORD: ICD-10-PCS | Mod: CPTII,95,, | Performed by: PHYSICIAN ASSISTANT

## 2023-05-22 PROCEDURE — 1160F RVW MEDS BY RX/DR IN RCRD: CPT | Mod: CPTII,95,, | Performed by: PHYSICIAN ASSISTANT

## 2023-05-22 PROCEDURE — 3044F HG A1C LEVEL LT 7.0%: CPT | Mod: CPTII,95,, | Performed by: PHYSICIAN ASSISTANT

## 2023-05-22 RX ORDER — TRAZODONE HYDROCHLORIDE 100 MG/1
100 TABLET ORAL NIGHTLY
Qty: 30 TABLET | Refills: 2 | Status: ON HOLD | OUTPATIENT
Start: 2023-05-22 | End: 2023-06-15

## 2023-05-22 RX ORDER — DULOXETIN HYDROCHLORIDE 60 MG/1
60 CAPSULE, DELAYED RELEASE ORAL DAILY
Qty: 90 CAPSULE | Refills: 2 | Status: ON HOLD | OUTPATIENT
Start: 2023-05-22 | End: 2023-06-15

## 2023-05-22 RX ORDER — ALPRAZOLAM 0.25 MG/1
0.25 TABLET ORAL DAILY PRN
Qty: 30 TABLET | Refills: 1 | Status: ON HOLD | OUTPATIENT
Start: 2023-05-22 | End: 2023-06-15

## 2023-05-22 NOTE — PROGRESS NOTES
"The patient location is: [place], [place]  The chief complaint leading to consultation is: depression f/u    Visit type: audiovisual -timoteo stopped working after 20 mins. Encounter completed via telephone      Each patient to whom he or she provides medical services by telemedicine is:  (1) informed of the relationship between the physician and patient and the respective role of any other health care provider with respect to management of the patient; and (2) notified that he or she may decline to receive medical services by telemedicine and may withdraw from such care at any time.    Notes:       Outpatient Psychiatry Follow-Up Visit (MD/TIMOTEO)    5/22/2023    Clinical Status of Patient:  Outpatient (Ambulatory)    Chief Complaint:  Nalini Varma is a 72 y.o. female who presents today for follow-up of depression, anxiety and adjustment problems.  Met with patient.      Interval History and Content of Current Session:  Interim Events/Subjective Report/Content of Current Session:    Pt reports today: "not doing chemo or radiation anymore, seeing the oncologist next week. They had to stop the radiation due to side effects my blood pressure dropped really low and I ended up in the hospital."    Mood "I feel a lot better, my mood is better off the chemo and radiation."    Pt states that she is fatigued, "I want to sleep all the time."    Patients mood is steady, affect appears mood congruent. Linear and logical, friendly and cooperative, good eye contact.    Denies SI/HI/AVH. Pt reports sleeping well and decreased appetite "I dont eat much, nauseated, sometimes looking at food turns my stomach". Denies side effects of medications.    Pt reports taking medications as prescribed and behaving appropriately during interview today.    Psychotherapy:  Target symptoms: depression, anxiety , adjustment  Why chosen therapy is appropriate versus another modality: relevant to diagnosis, evidence based practice  Outcome monitoring " "methods: self-report, observation  Therapeutic intervention type: supportive psychotherapy  Topics discussed/themes: stress related to medical comorbidities, building skills sets for symptom management, symptom recognition  The patient's response to the intervention is accepting. The patient's progress toward treatment goals is good.   Duration of intervention: 16 minutes.      Prior visit:    Pt reports today: "its been tough, radiation treatment and dealing with chemo, I was hospitalized and put in ICU and decreased blood pressure over xmas"    Patients mood is steady. Linear and logical, friendly and cooperative, good eye contact.    Pt was sent for emotional support animal letter, waiting until chemo and radiation treatments are over before she gets a cat. Pt excited regarding this    Denies SI/HI/AVH. Pt reports sleeping well and normal appetite. Denies side effects of medications.    Pt reports taking medications as prescribed and behaving appropriately during interview today.      Review of Systems       Psychiatric Review Of Systems - Is patient experiencing or having changes in:  sleep: no  appetite: no  weight: no  energy/anergy: yes  interest/pleasure/anhedonia: yes  somatic symptoms: no  libido: no  anxiety/panic: yes  guilty/hopelessness: no  concentration: no  S.I.B.s/risky behavior: no  Irritability: no  Racing thoughts: no  Impulsive behaviors: no  Paranoia: no  AVH: no      Past Medical, Family and Social History: The patient's past medical, family and social history have been reviewed and updated as appropriate within the electronic medical record - see encounter notes.      Current Medications:   Medication List with Changes/Refills   Current Medications    ALBUTEROL (VENTOLIN HFA) 90 MCG/ACTUATION INHALER    inhale 1-2 puffs as needed every 6 hours for wheezing and shortness of breath    ALPRAZOLAM (XANAX) 0.25 MG TABLET    Take 1 tablet (0.25 mg total) by mouth daily as needed for Anxiety.    " ASCORBIC ACID, VITAMIN C, (VITAMIN C) 250 MG TABLET    Take 250 mg by mouth once daily.    ATORVASTATIN (LIPITOR) 40 MG TABLET    TAKE 1 TABLET BY MOUTH EVERY DAILY    AZELASTINE (ASTELIN) 137 MCG (0.1 %) NASAL SPRAY    Instill 1 spray (137 mcg total) by Nasal route 2 (two) times daily.    B COMPLEX VITAMINS CAPSULE    Take 1 capsule by mouth once daily.    BIOTIN 10 MG TAB    Take 10 mg by mouth once daily.    CALCIUM CARBONATE (OS-SANDRINE) 500 MG CALCIUM (1,250 MG) TABLET    Take 2 tablets (1,000 mg total) by mouth 2 (two) times daily.    CYANOCOBALAMIN, VITAMIN B-12, 1,000 MCG TBSR    Take 1,000 mcg by mouth once daily.    DENOSUMAB (PROLIA) 60 MG/ML SYRG    Inject 1 mL (60 mg total) into the skin every 6 (six) months.    DILTIAZEM (CARDIZEM CD) 180 MG 24 HR CAPSULE    Take 1 capsule (180 mg total) by mouth once daily.    DULOXETINE (CYMBALTA) 60 MG CAPSULE    Take 1 capsule (60 mg total) by mouth once daily.    FERROUS SULFATE (FEOSOL) 325 MG (65 MG IRON) TAB TABLET    Take 325 mg by mouth daily with breakfast.    FLUTICASONE (FLONASE) 50 MCG/ACTUATION NASAL SPRAY    1 spray by Each Nare route 2 (two) times daily as needed for Rhinitis.    FLUTICASONE PROPION-SALMETEROL 115-21 MCG/DOSE (ADVAIR HFA) 115-21 MCG/ACTUATION HFAA INHALER    Inhale 2 puffs into the lungs every 12 (twelve) hours.    GABAPENTIN (NEURONTIN) 300 MG CAPSULE    Take 1 capsule (300 mg total) by mouth 3 (three) times daily.    GUAIFENESIN (MUCINEX) 600 MG 12 HR TABLET    Take 1,200 mg by mouth 2 (two) times daily as needed for Congestion.    HYDROCODONE-ACETAMINOPHEN (NORCO)  MG PER TABLET    Take 1 tablet by mouth every 12 (twelve) hours as needed for Pain.    LUTEIN 40 MG CAP    Take by mouth.    MAGNESIUM OXIDE (MAG-OX) 400 MG (241.3 MG MAGNESIUM) TABLET    Take 1 tablet by mouth every 12 (twelve) hours.    METOPROLOL SUCCINATE (TOPROL-XL) 25 MG 24 HR TABLET    Take 2 tablets (50 mg total) by mouth once daily.     MULTIVIT-MIN/IRON/FOLIC/LUTEIN (CENTRUM SILVER WOMEN ORAL)    Take 1 tablet by mouth once daily.    ONDANSETRON (ZOFRAN-ODT) 8 MG TBDL    dissolve 1 tablet (8 mg total) by mouth every 8 (eight) hours as needed (nausea).    PANTOPRAZOLE (PROTONIX) 40 MG TABLET    Take 1 tablet (40 mg total) by mouth once daily.    PROMETHAZINE (PHENERGAN) 25 MG TABLET    Take 1 tablet (25 mg total) by mouth every 6 (six) hours as needed for Nausea.    TORSEMIDE (DEMADEX) 20 MG TAB    Take 1 tablet (20 mg total) by mouth once daily. Take an extra dose daily for 3 days if gains 2-3 pounds in 2-3 days    TRAZODONE (DESYREL) 100 MG TABLET    Take 1 tablet (100 mg total) by mouth every evening.    UMECLIDINIUM (INCRUSE ELLIPTA) 62.5 MCG/ACTUATION INHALATION CAPSULE    Inhale 1 puff into the lungs once daily. Controller    VITAMIN D (VITAMIN D3) 1000 UNITS TAB    Take 1 tablet (1,000 Units total) by mouth once daily.    ZINC ORAL    Take by mouth.         Allergies:   Review of patient's allergies indicates:   Allergen Reactions    Wellbutrin [bupropion hcl] Other (See Comments)     Hyponatremia and hypomagnesia     Zoloft [sertraline] Other (See Comments)     Hyponatremia and hypomagnesia     Bananas [banana]      Emesis, and stomach cramps    Patient states she is not allergic to Banana; she cannot eat one everyday         Vitals   There were no vitals filed for this visit.       Labs/Imaging/Studies:   No results found for this or any previous visit (from the past 48 hour(s)).   No results found for: PHENYTOIN, PHENOBARB, VALPROATE, CBMZ    Compliance: yes    Side effects: None    Risk Parameters:  Patient reports no suicidal ideation  Patient reports no homicidal ideation  Patient reports no self-injurious behavior  Patient reports no violent behavior    Exam (detailed: at least 9 elements; comprehensive: all 15 elements)   Constitutional  Vitals:  Most recent vital signs, dated less than 90 days prior to this appointment, were  reviewed.   There were no vitals filed for this visit.     General:  unremarkable, age appropriate     Musculoskeletal  Muscle Strength/Tone:  not examined   Gait & Station:  Not examined     Psychiatric  Speech:  no latency; no press   Mood & Affect:  Steady  Mood congruent   Thought Process:  normal and logical   Associations:  intact   Thought Content:  normal, no suicidality, no homicidality, delusions, or paranoia   Insight:  intact, has awareness of illness   Judgement: behavior is adequate to circumstances   Orientation:  grossly intact   Memory: intact for content of interview   Language: grossly intact   Attention Span & Concentration:  able to focus   Fund of Knowledge:  intact and appropriate to age and level of education     Assessment and Diagnosis   Status/Progress: Based on the examination today, the patient's problem(s) is/are adequately but not ideally controlled.  New problems have been presented today.   Co-morbidities are complicating management of the primary condition.  There are no active rule-out diagnoses for this patient at this time.     General Impression:      ICD-10-CM ICD-9-CM   1. MDD (major depressive disorder), recurrent episode, moderate  F33.1 296.32   2. PTSD (post-traumatic stress disorder)  F43.10 309.81   3. Generalized anxiety disorder  F41.1 300.02   4. Vitamin D deficiency  E55.9 268.9           Intervention/Counseling/Treatment Plan   Medication Management: Continue current medications. The risks and benefits of medication were discussed with the patient.    -cymbalta 60mg daily    -trazodone 100mg qhs    -continue xanax 0.25mg daily prn severe anxiety. Instructed pt to use sparingly    -continue current vitamin D supplement    -prescribed emotional support animal. Letter sent to patients landlord/housing authority.      Return to Clinic: 2 months      Leonardo Kolb PA-C      Total patient contact time: 33 min  Total time (chart review, patient contact, documentation): 37  min      *This note has been prepared using a combination of a dictation device and typing.  It has been checked for errors but some errors may still exist within the note as a result of speech recognition errors and/or typographical errors.

## 2023-05-24 ENCOUNTER — DOCUMENTATION ONLY (OUTPATIENT)
Dept: HEMATOLOGY/ONCOLOGY | Facility: CLINIC | Age: 73
End: 2023-05-24
Payer: MEDICARE

## 2023-05-24 NOTE — PROGRESS NOTES
MAURY left a VM with patient requesting a call back. MAURY called to arrange transportation for patient's labs on 5/25 and discuss utilizing [facility] for transportation for future follow up visits as patient was told she could receive telehealth follow up visits but would prefer face to face visit.    Patient called back to confirm need. She explained that appointments were consolidated to one day decreasing needed visits by half.    MAURY arranged 12:30p pickup with Lucila at [facility].

## 2023-05-25 ENCOUNTER — PATIENT MESSAGE (OUTPATIENT)
Dept: INTERNAL MEDICINE | Facility: CLINIC | Age: 73
End: 2023-05-25
Payer: MEDICARE

## 2023-05-25 ENCOUNTER — DOCUMENTATION ONLY (OUTPATIENT)
Dept: HEMATOLOGY/ONCOLOGY | Facility: CLINIC | Age: 73
End: 2023-05-25
Payer: MEDICARE

## 2023-05-25 DIAGNOSIS — M17.0 PRIMARY OSTEOARTHRITIS OF BOTH KNEES: ICD-10-CM

## 2023-05-25 DIAGNOSIS — G89.4 CHRONIC PAIN SYNDROME: ICD-10-CM

## 2023-05-25 RX ORDER — HYDROCODONE BITARTRATE AND ACETAMINOPHEN 10; 325 MG/1; MG/1
1 TABLET ORAL EVERY 12 HOURS PRN
Qty: 60 TABLET | Refills: 0 | Status: ON HOLD | OUTPATIENT
Start: 2023-05-25 | End: 2023-06-15

## 2023-05-25 NOTE — PROGRESS NOTES
Patient wasn't feeling well and cancelled her appointment. She will need a ride tomorrow, pickup at 1:30p.    SW emailed Lucila at [facility] asking if [facility] will be billed for the two recent cancellations and requesting a ride for tomorrow at 1:30p.

## 2023-05-25 NOTE — TELEPHONE ENCOUNTER
Care Due:                  Date            Visit Type   Department     Provider  --------------------------------------------------------------------------------                                EP -                              PRIMARY      [facility] INTERNAL  Last Visit: 03-      CARE ([facility])   MEDICINE       Yane Sahni                              EP -                              PRIMARY      [facility] INTERNAL  Next Visit: 08-      CARE ([facility])   MEDICINE       Yane Sahni                                                            Last  Test          Frequency    Reason                     Performed    Due Date  --------------------------------------------------------------------------------    Lipid Panel.  12 months..  atorvastatin.............  02-   02-    [facility] Embedded Care Due Messages. Reference number: [id number].   5/25/2023 3:05:09 PM CDT

## 2023-05-25 NOTE — TELEPHONE ENCOUNTER
Refill sent in   Her lyft paperwork will not be completed by 6/5 as I am out of the office starting tomorrow and will not be back in until 6/6/2023 - best to drop if off when she can and I will complete it when I return

## 2023-05-26 ENCOUNTER — PATIENT MESSAGE (OUTPATIENT)
Dept: INTERNAL MEDICINE | Facility: CLINIC | Age: 73
End: 2023-05-26
Payer: MEDICARE

## 2023-06-04 ENCOUNTER — HOSPITAL ENCOUNTER (INPATIENT)
Facility: OTHER | Age: 73
LOS: 11 days | Discharge: HOSPICE | DRG: 643 | End: 2023-06-16
Attending: EMERGENCY MEDICINE | Admitting: EMERGENCY MEDICINE
Payer: COMMERCIAL

## 2023-06-04 DIAGNOSIS — I50.9 HEART FAILURE, UNSPECIFIED HF CHRONICITY, UNSPECIFIED HEART FAILURE TYPE: ICD-10-CM

## 2023-06-04 DIAGNOSIS — J44.9 CHRONIC OBSTRUCTIVE PULMONARY DISEASE, UNSPECIFIED COPD TYPE: ICD-10-CM

## 2023-06-04 DIAGNOSIS — E22.2 SYNDROME OF INAPPROPRIATE SECRETION OF ANTIDIURETIC HORMONE: ICD-10-CM

## 2023-06-04 DIAGNOSIS — J96.22 ACUTE ON CHRONIC RESPIRATORY FAILURE WITH HYPOXIA AND HYPERCAPNIA: ICD-10-CM

## 2023-06-04 DIAGNOSIS — J96.01 ACUTE HYPOXEMIC RESPIRATORY FAILURE: ICD-10-CM

## 2023-06-04 DIAGNOSIS — R40.0 SOMNOLENCE: ICD-10-CM

## 2023-06-04 DIAGNOSIS — R45.1 AGITATION: ICD-10-CM

## 2023-06-04 DIAGNOSIS — F17.200 TOBACCO DEPENDENCE: ICD-10-CM

## 2023-06-04 DIAGNOSIS — D64.9 ANEMIA, UNSPECIFIED TYPE: ICD-10-CM

## 2023-06-04 DIAGNOSIS — Z71.89 ADVANCE CARE PLANNING: ICD-10-CM

## 2023-06-04 DIAGNOSIS — R53.81 DEBILITY: ICD-10-CM

## 2023-06-04 DIAGNOSIS — R53.1 WEAKNESS: ICD-10-CM

## 2023-06-04 DIAGNOSIS — C34.92 RECURRENT ADENOCARCINOMA OF LEFT LUNG: ICD-10-CM

## 2023-06-04 DIAGNOSIS — E87.1 HYPONATREMIA: ICD-10-CM

## 2023-06-04 DIAGNOSIS — R53.1 GENERALIZED WEAKNESS: Primary | ICD-10-CM

## 2023-06-04 DIAGNOSIS — Z51.5 END OF LIFE CARE: ICD-10-CM

## 2023-06-04 DIAGNOSIS — J96.21 ACUTE ON CHRONIC RESPIRATORY FAILURE WITH HYPOXIA AND HYPERCAPNIA: ICD-10-CM

## 2023-06-04 DIAGNOSIS — I10 ESSENTIAL HYPERTENSION: ICD-10-CM

## 2023-06-04 PROCEDURE — 87635 SARS-COV-2 COVID-19 AMP PRB: CPT | Performed by: EMERGENCY MEDICINE

## 2023-06-04 PROCEDURE — 99285 EMERGENCY DEPT VISIT HI MDM: CPT

## 2023-06-04 NOTE — Clinical Note
Diagnosis: Weakness [[id number]]   Future Attending Provider: NIKKO BERNARD [[id number]]   Admitting Provider:: ELLEN CHRISTY [[id number]]

## 2023-06-05 PROBLEM — R40.0 SOMNOLENCE: Status: ACTIVE | Noted: 2023-06-05

## 2023-06-05 LAB
ALBUMIN SERPL BCP-MCNC: 1.8 G/DL (ref 3.5–5.2)
ALBUMIN SERPL BCP-MCNC: 2.1 G/DL (ref 3.5–5.2)
ALP SERPL-CCNC: 175 U/L (ref 55–135)
ALP SERPL-CCNC: 196 U/L (ref 55–135)
ALT SERPL W/O P-5'-P-CCNC: 18 U/L (ref 10–44)
ALT SERPL W/O P-5'-P-CCNC: 18 U/L (ref 10–44)
ANION GAP SERPL CALC-SCNC: 11 MMOL/L (ref 8–16)
ANION GAP SERPL CALC-SCNC: 8 MMOL/L (ref 8–16)
AST SERPL-CCNC: 32 U/L (ref 10–40)
AST SERPL-CCNC: 33 U/L (ref 10–40)
BACTERIA #/AREA URNS HPF: ABNORMAL /HPF
BASOPHILS # BLD AUTO: 0.01 K/UL (ref 0–0.2)
BASOPHILS # BLD AUTO: 0.03 K/UL (ref 0–0.2)
BASOPHILS NFR BLD: 0.1 % (ref 0–1.9)
BASOPHILS NFR BLD: 0.3 % (ref 0–1.9)
BILIRUB SERPL-MCNC: 0.5 MG/DL (ref 0.1–1)
BILIRUB SERPL-MCNC: 0.7 MG/DL (ref 0.1–1)
BILIRUB UR QL STRIP: NEGATIVE
BNP SERPL-MCNC: 160 PG/ML (ref 0–99)
BUN SERPL-MCNC: 18 MG/DL (ref 8–23)
BUN SERPL-MCNC: 19 MG/DL (ref 8–23)
CALCIUM SERPL-MCNC: 7.8 MG/DL (ref 8.7–10.5)
CALCIUM SERPL-MCNC: 8.4 MG/DL (ref 8.7–10.5)
CHLORIDE SERPL-SCNC: 87 MMOL/L (ref 95–110)
CHLORIDE SERPL-SCNC: 89 MMOL/L (ref 95–110)
CK SERPL-CCNC: 95 U/L (ref 20–180)
CLARITY UR: ABNORMAL
CO2 SERPL-SCNC: 29 MMOL/L (ref 23–29)
CO2 SERPL-SCNC: 30 MMOL/L (ref 23–29)
COLOR UR: YELLOW
CREAT SERPL-MCNC: 0.9 MG/DL (ref 0.5–1.4)
CREAT SERPL-MCNC: 1 MG/DL (ref 0.5–1.4)
CTP QC/QA: YES
CTP QC/QA: YES
DIFFERENTIAL METHOD: ABNORMAL
DIFFERENTIAL METHOD: ABNORMAL
EOSINOPHIL # BLD AUTO: 0 K/UL (ref 0–0.5)
EOSINOPHIL # BLD AUTO: 0 K/UL (ref 0–0.5)
EOSINOPHIL NFR BLD: 0.1 % (ref 0–8)
EOSINOPHIL NFR BLD: 0.1 % (ref 0–8)
ERYTHROCYTE [DISTWIDTH] IN BLOOD BY AUTOMATED COUNT: 16.5 % (ref 11.5–14.5)
ERYTHROCYTE [DISTWIDTH] IN BLOOD BY AUTOMATED COUNT: 16.6 % (ref 11.5–14.5)
EST. GFR  (NO RACE VARIABLE): 59 ML/MIN/1.73 M^2
EST. GFR  (NO RACE VARIABLE): >60 ML/MIN/1.73 M^2
GLUCOSE SERPL-MCNC: 92 MG/DL (ref 70–110)
GLUCOSE SERPL-MCNC: 98 MG/DL (ref 70–110)
GLUCOSE UR QL STRIP: NEGATIVE
HCT VFR BLD AUTO: 29.2 % (ref 37–48.5)
HCT VFR BLD AUTO: 30.9 % (ref 37–48.5)
HGB BLD-MCNC: 11.6 G/DL (ref 12–16)
HGB BLD-MCNC: 9.9 G/DL (ref 12–16)
HGB UR QL STRIP: NEGATIVE
IMM GRANULOCYTES # BLD AUTO: 0.03 K/UL (ref 0–0.04)
IMM GRANULOCYTES # BLD AUTO: 0.05 K/UL (ref 0–0.04)
IMM GRANULOCYTES NFR BLD AUTO: 0.4 % (ref 0–0.5)
IMM GRANULOCYTES NFR BLD AUTO: 0.5 % (ref 0–0.5)
KETONES UR QL STRIP: NEGATIVE
LEUKOCYTE ESTERASE UR QL STRIP: ABNORMAL
LYMPHOCYTES # BLD AUTO: 1 K/UL (ref 1–4.8)
LYMPHOCYTES # BLD AUTO: 1.2 K/UL (ref 1–4.8)
LYMPHOCYTES NFR BLD: 10.3 % (ref 18–48)
LYMPHOCYTES NFR BLD: 15.2 % (ref 18–48)
MAGNESIUM SERPL-MCNC: 1.9 MG/DL (ref 1.6–2.6)
MAGNESIUM SERPL-MCNC: 2 MG/DL (ref 1.6–2.6)
MCH RBC QN AUTO: 32.1 PG (ref 27–31)
MCH RBC QN AUTO: 35.4 PG (ref 27–31)
MCHC RBC AUTO-ENTMCNC: 33.9 G/DL (ref 32–36)
MCHC RBC AUTO-ENTMCNC: 37.5 G/DL (ref 32–36)
MCV RBC AUTO: 94 FL (ref 82–98)
MCV RBC AUTO: 95 FL (ref 82–98)
MICROSCOPIC COMMENT: ABNORMAL
MONOCYTES # BLD AUTO: 0.7 K/UL (ref 0.3–1)
MONOCYTES # BLD AUTO: 0.9 K/UL (ref 0.3–1)
MONOCYTES NFR BLD: 8.8 % (ref 4–15)
MONOCYTES NFR BLD: 9.7 % (ref 4–15)
NEUTROPHILS # BLD AUTO: 5.7 K/UL (ref 1.8–7.7)
NEUTROPHILS # BLD AUTO: 7.5 K/UL (ref 1.8–7.7)
NEUTROPHILS NFR BLD: 75.4 % (ref 38–73)
NEUTROPHILS NFR BLD: 79.1 % (ref 38–73)
NITRITE UR QL STRIP: NEGATIVE
NRBC BLD-RTO: 0 /100 WBC
NRBC BLD-RTO: 0 /100 WBC
PH UR STRIP: 7 [PH] (ref 5–8)
PHOSPHATE SERPL-MCNC: 3.8 MG/DL (ref 2.7–4.5)
PLATELET # BLD AUTO: 245 K/UL (ref 150–450)
PLATELET # BLD AUTO: 289 K/UL (ref 150–450)
PMV BLD AUTO: 9.3 FL (ref 9.2–12.9)
PMV BLD AUTO: 9.6 FL (ref 9.2–12.9)
POC MOLECULAR INFLUENZA A AGN: NEGATIVE
POC MOLECULAR INFLUENZA B AGN: NEGATIVE
POTASSIUM SERPL-SCNC: 3.8 MMOL/L (ref 3.5–5.1)
POTASSIUM SERPL-SCNC: 3.8 MMOL/L (ref 3.5–5.1)
PROT SERPL-MCNC: 4.8 G/DL (ref 6–8.4)
PROT SERPL-MCNC: 5.4 G/DL (ref 6–8.4)
PROT UR QL STRIP: NEGATIVE
RBC # BLD AUTO: 3.08 M/UL (ref 4–5.4)
RBC # BLD AUTO: 3.28 M/UL (ref 4–5.4)
RBC #/AREA URNS HPF: 2 /HPF (ref 0–4)
SARS-COV-2 RDRP RESP QL NAA+PROBE: NEGATIVE
SODIUM SERPL-SCNC: 126 MMOL/L (ref 136–145)
SODIUM SERPL-SCNC: 128 MMOL/L (ref 136–145)
SP GR UR STRIP: 1.01 (ref 1–1.03)
SQUAMOUS #/AREA URNS HPF: 6 /HPF
TROPONIN I SERPL DL<=0.01 NG/ML-MCNC: <0.006 NG/ML (ref 0–0.03)
URN SPEC COLLECT METH UR: ABNORMAL
UROBILINOGEN UR STRIP-ACNC: NEGATIVE EU/DL
WBC # BLD AUTO: 7.62 K/UL (ref 3.9–12.7)
WBC # BLD AUTO: 9.45 K/UL (ref 3.9–12.7)
WBC #/AREA URNS HPF: 33 /HPF (ref 0–5)

## 2023-06-05 PROCEDURE — 83735 ASSAY OF MAGNESIUM: CPT | Mod: 91 | Performed by: NURSE PRACTITIONER

## 2023-06-05 PROCEDURE — 83880 ASSAY OF NATRIURETIC PEPTIDE: CPT | Performed by: EMERGENCY MEDICINE

## 2023-06-05 PROCEDURE — 11000001 HC ACUTE MED/SURG PRIVATE ROOM

## 2023-06-05 PROCEDURE — 87077 CULTURE AEROBIC IDENTIFY: CPT | Performed by: NURSE PRACTITIONER

## 2023-06-05 PROCEDURE — 99223 PR INITIAL HOSPITAL CARE,LEVL III: ICD-10-PCS | Mod: ,,, | Performed by: STUDENT IN AN ORGANIZED HEALTH CARE EDUCATION/TRAINING PROGRAM

## 2023-06-05 PROCEDURE — 85025 COMPLETE CBC W/AUTO DIFF WBC: CPT | Mod: 91 | Performed by: NURSE PRACTITIONER

## 2023-06-05 PROCEDURE — 63600175 PHARM REV CODE 636 W HCPCS: Performed by: NURSE PRACTITIONER

## 2023-06-05 PROCEDURE — 80053 COMPREHEN METABOLIC PANEL: CPT | Mod: 91 | Performed by: NURSE PRACTITIONER

## 2023-06-05 PROCEDURE — 96372 THER/PROPH/DIAG INJ SC/IM: CPT | Performed by: NURSE PRACTITIONER

## 2023-06-05 PROCEDURE — 25000003 PHARM REV CODE 250: Performed by: NURSE PRACTITIONER

## 2023-06-05 PROCEDURE — 87186 SC STD MICRODIL/AGAR DIL: CPT | Performed by: NURSE PRACTITIONER

## 2023-06-05 PROCEDURE — 83735 ASSAY OF MAGNESIUM: CPT | Performed by: EMERGENCY MEDICINE

## 2023-06-05 PROCEDURE — 99900035 HC TECH TIME PER 15 MIN (STAT)

## 2023-06-05 PROCEDURE — 84100 ASSAY OF PHOSPHORUS: CPT | Performed by: NURSE PRACTITIONER

## 2023-06-05 PROCEDURE — 87086 URINE CULTURE/COLONY COUNT: CPT | Performed by: NURSE PRACTITIONER

## 2023-06-05 PROCEDURE — 99223 1ST HOSP IP/OBS HIGH 75: CPT | Mod: ,,, | Performed by: HOSPITALIST

## 2023-06-05 PROCEDURE — 99223 1ST HOSP IP/OBS HIGH 75: CPT | Mod: ,,, | Performed by: STUDENT IN AN ORGANIZED HEALTH CARE EDUCATION/TRAINING PROGRAM

## 2023-06-05 PROCEDURE — 27000221 HC OXYGEN, UP TO 24 HOURS

## 2023-06-05 PROCEDURE — 87088 URINE BACTERIA CULTURE: CPT | Performed by: NURSE PRACTITIONER

## 2023-06-05 PROCEDURE — 84484 ASSAY OF TROPONIN QUANT: CPT | Performed by: EMERGENCY MEDICINE

## 2023-06-05 PROCEDURE — 81000 URINALYSIS NONAUTO W/SCOPE: CPT | Performed by: NURSE PRACTITIONER

## 2023-06-05 PROCEDURE — 85025 COMPLETE CBC W/AUTO DIFF WBC: CPT | Performed by: EMERGENCY MEDICINE

## 2023-06-05 PROCEDURE — 94640 AIRWAY INHALATION TREATMENT: CPT

## 2023-06-05 PROCEDURE — 82550 ASSAY OF CK (CPK): CPT | Performed by: EMERGENCY MEDICINE

## 2023-06-05 PROCEDURE — 25000003 PHARM REV CODE 250: Performed by: HOSPITALIST

## 2023-06-05 PROCEDURE — 25000242 PHARM REV CODE 250 ALT 637 W/ HCPCS: Performed by: NURSE PRACTITIONER

## 2023-06-05 PROCEDURE — 94761 N-INVAS EAR/PLS OXIMETRY MLT: CPT

## 2023-06-05 PROCEDURE — 99223 PR INITIAL HOSPITAL CARE,LEVL III: ICD-10-PCS | Mod: ,,, | Performed by: HOSPITALIST

## 2023-06-05 PROCEDURE — 80053 COMPREHEN METABOLIC PANEL: CPT | Performed by: EMERGENCY MEDICINE

## 2023-06-05 RX ORDER — LANOLIN ALCOHOL/MO/W.PET/CERES
400 CREAM (GRAM) TOPICAL 2 TIMES DAILY
Status: DISCONTINUED | OUTPATIENT
Start: 2023-06-05 | End: 2023-06-15

## 2023-06-05 RX ORDER — ASCORBIC ACID 250 MG
250 TABLET ORAL DAILY
Status: DISCONTINUED | OUTPATIENT
Start: 2023-06-05 | End: 2023-06-15

## 2023-06-05 RX ORDER — DILTIAZEM HYDROCHLORIDE 180 MG/1
180 CAPSULE, COATED, EXTENDED RELEASE ORAL DAILY
Status: DISCONTINUED | OUTPATIENT
Start: 2023-06-05 | End: 2023-06-15

## 2023-06-05 RX ORDER — HEPARIN SODIUM 5000 [USP'U]/ML
5000 INJECTION, SOLUTION INTRAVENOUS; SUBCUTANEOUS EVERY 8 HOURS
Status: DISCONTINUED | OUTPATIENT
Start: 2023-06-05 | End: 2023-06-15

## 2023-06-05 RX ORDER — METOPROLOL SUCCINATE 50 MG/1
50 TABLET, EXTENDED RELEASE ORAL DAILY
Status: DISCONTINUED | OUTPATIENT
Start: 2023-06-05 | End: 2023-06-15

## 2023-06-05 RX ORDER — DULOXETIN HYDROCHLORIDE 30 MG/1
60 CAPSULE, DELAYED RELEASE ORAL DAILY
Status: DISCONTINUED | OUTPATIENT
Start: 2023-06-05 | End: 2023-06-15

## 2023-06-05 RX ORDER — LANOLIN ALCOHOL/MO/W.PET/CERES
1000 CREAM (GRAM) TOPICAL DAILY
Status: DISCONTINUED | OUTPATIENT
Start: 2023-06-05 | End: 2023-06-15

## 2023-06-05 RX ORDER — CHOLECALCIFEROL (VITAMIN D3) 25 MCG
1000 TABLET ORAL DAILY
Status: DISCONTINUED | OUTPATIENT
Start: 2023-06-05 | End: 2023-06-15

## 2023-06-05 RX ORDER — SODIUM CHLORIDE 0.9 % (FLUSH) 0.9 %
10 SYRINGE (ML) INJECTION EVERY 8 HOURS PRN
Status: DISCONTINUED | OUTPATIENT
Start: 2023-06-05 | End: 2023-06-16 | Stop reason: HOSPADM

## 2023-06-05 RX ORDER — ATORVASTATIN CALCIUM 20 MG/1
40 TABLET, FILM COATED ORAL DAILY
Status: DISCONTINUED | OUTPATIENT
Start: 2023-06-05 | End: 2023-06-15

## 2023-06-05 RX ORDER — ACETAMINOPHEN 325 MG/1
650 TABLET ORAL EVERY 4 HOURS PRN
Status: DISCONTINUED | OUTPATIENT
Start: 2023-06-05 | End: 2023-06-11

## 2023-06-05 RX ORDER — ONDANSETRON 2 MG/ML
4 INJECTION INTRAMUSCULAR; INTRAVENOUS EVERY 8 HOURS PRN
Status: DISCONTINUED | OUTPATIENT
Start: 2023-06-05 | End: 2023-06-16 | Stop reason: HOSPADM

## 2023-06-05 RX ORDER — LANOLIN ALCOHOL/MO/W.PET/CERES
1 CREAM (GRAM) TOPICAL DAILY
Status: DISCONTINUED | OUTPATIENT
Start: 2023-06-05 | End: 2023-06-15

## 2023-06-05 RX ORDER — IPRATROPIUM BROMIDE AND ALBUTEROL SULFATE 2.5; .5 MG/3ML; MG/3ML
3 SOLUTION RESPIRATORY (INHALATION) EVERY 4 HOURS PRN
Status: DISCONTINUED | OUTPATIENT
Start: 2023-06-05 | End: 2023-06-15

## 2023-06-05 RX ORDER — TORSEMIDE 20 MG/1
20 TABLET ORAL DAILY
Status: DISCONTINUED | OUTPATIENT
Start: 2023-06-05 | End: 2023-06-07

## 2023-06-05 RX ORDER — FLUTICASONE FUROATE AND VILANTEROL 100; 25 UG/1; UG/1
1 POWDER RESPIRATORY (INHALATION) DAILY
Status: DISCONTINUED | OUTPATIENT
Start: 2023-06-05 | End: 2023-06-16 | Stop reason: HOSPADM

## 2023-06-05 RX ORDER — NALOXONE HCL 0.4 MG/ML
0.02 VIAL (ML) INJECTION
Status: DISCONTINUED | OUTPATIENT
Start: 2023-06-05 | End: 2023-06-16 | Stop reason: HOSPADM

## 2023-06-05 RX ADMIN — DULOXETINE 60 MG: 30 CAPSULE, DELAYED RELEASE ORAL at 09:06

## 2023-06-05 RX ADMIN — DILTIAZEM HYDROCHLORIDE 180 MG: 180 CAPSULE, COATED, EXTENDED RELEASE ORAL at 10:06

## 2023-06-05 RX ADMIN — Medication 1000 UNITS: at 09:06

## 2023-06-05 RX ADMIN — ACETAMINOPHEN 650 MG: 325 TABLET, FILM COATED ORAL at 09:06

## 2023-06-05 RX ADMIN — FLUTICASONE FUROATE AND VILANTEROL TRIFENATATE 1 PUFF: 100; 25 POWDER RESPIRATORY (INHALATION) at 08:06

## 2023-06-05 RX ADMIN — ATORVASTATIN CALCIUM 40 MG: 20 TABLET, FILM COATED ORAL at 10:06

## 2023-06-05 RX ADMIN — METOPROLOL SUCCINATE 50 MG: 50 TABLET, EXTENDED RELEASE ORAL at 10:06

## 2023-06-05 RX ADMIN — THERA TABS 1 TABLET: TAB at 10:06

## 2023-06-05 RX ADMIN — HEPARIN SODIUM 5000 UNITS: 5000 INJECTION INTRAVENOUS; SUBCUTANEOUS at 09:06

## 2023-06-05 RX ADMIN — HEPARIN SODIUM 5000 UNITS: 5000 INJECTION INTRAVENOUS; SUBCUTANEOUS at 06:06

## 2023-06-05 RX ADMIN — Medication 250 MG: at 09:06

## 2023-06-05 RX ADMIN — MICONAZOLE NITRATE: 20 OINTMENT TOPICAL at 02:06

## 2023-06-05 RX ADMIN — MICONAZOLE NITRATE: 20 OINTMENT TOPICAL at 09:06

## 2023-06-05 RX ADMIN — Medication 400 MG: at 10:06

## 2023-06-05 RX ADMIN — TORSEMIDE 20 MG: 20 TABLET ORAL at 09:06

## 2023-06-05 RX ADMIN — Medication 400 MG: at 09:06

## 2023-06-05 RX ADMIN — FERROUS SULFATE TAB 325 MG (65 MG ELEMENTAL FE) 1 EACH: 325 (65 FE) TAB at 10:06

## 2023-06-05 RX ADMIN — CYANOCOBALAMIN TAB 1000 MCG 1000 MCG: 1000 TAB at 09:06

## 2023-06-05 NOTE — H&P
[facility] Emergency [facility] Medicine  History & Physical    Patient Name: Nalini Varma  MRN: [id number]  Patient Class: OP- Observation  Admission Date: 6/4/2023  Attending Physician: Breana Mckinney MD   Primary Care Provider: Yane Sahni MD         Patient information was obtained from patient, past medical records and ER records.     Subjective:     Principal Problem:Debility    Chief Complaint:   Chief Complaint   Patient presents with    Leg Pain     Pt presents with bilateral leg pain x 16 hours. Pt denies recent trauma/injury.         HPI: The patient is a 72 y.o. female with a past medical history of Chronic obstructive pulmonary disease, Hyperlipidemia, Iron deficiency anemia, and Stage III CKD who presents for evaluation of bilateral leg pain. She reports she has been wheelchair bound for a year and states that she has had increased difficulty caring for herself over the last week. She reports she has been unable to transfer herself to her bed or to the toilet so she has been using a diaper. She called EMS today after she slid out of her wheelchair and was unable to get back into it without assistance. The patient reports she has been taking hydrocodone for the pain.  On initial workup, the patient is noted to have a low Na of 128 and appears disheveled.  The patient states she came to the hospital because she can't take care of herself anymore and would like placement for assistance.      Past Medical History:   Diagnosis Date    Anxiety     Cervical spinal stenosis     Choledocholithiasis     Chronic obstructive pulmonary disease 03/16/2016    Degenerative disc disease of cervical spine     Diverticulosis 04/24/2018    History of alcohol abuse 03/02/2021    History of gastric ulcer     History of L3 compression fracture 12/19/2018    History of lung cancer     s/p completion of XRT in 10/2020    Hyperlipidemia     Iron deficiency anemia     Osteoarthritis     Stage III  CKD 2017       Past Surgical History:   Procedure Laterality Date    BREAST CYST EXCISION Right     1967    CATARACT EXTRACTION      COLONOSCOPY  2015    COLONOSCOPY N/A 6/8/2022    Procedure: COLONOSCOPY;  Surgeon: Helio Cheema MD;  Location: [facility] (2ND FLR);  Service: Endoscopy;  Laterality: N/A;  5/25-Pt requesting Dr. Cheema-approval given per Dr. Cheema-ml  Fully vaccinated, prep instr portal -ml    CORONARY ANGIOGRAPHY N/A 7/20/2018    Procedure: ANGIOGRAM, CORONARY ARTERY;  Surgeon: Willard Roberts MD;  Location: [facility] CATH LAB;  Service: Cardiovascular;  Laterality: N/A;    ENDOSCOPIC ULTRASOUND OF UPPER GASTROINTESTINAL TRACT N/A 3/24/2021    Procedure: ULTRASOUND, UPPER GI TRACT, ENDOSCOPIC;  Surgeon: Helio Cheema MD;  Location: [facility] (2ND FLR);  Service: Endoscopy;  Laterality: N/A;    ENDOSCOPIC ULTRASOUND OF UPPER GASTROINTESTINAL TRACT N/A 4/25/2022    Procedure: ULTRASOUND, UPPER GI TRACT, ENDOSCOPIC;  Surgeon: Helio Cheema MD;  [facility]  Service: Endoscopy;  Laterality: N/A;  cardiac risk assessment 1 per Dr. Ortez. see t/e 3/17-SC  3/25:new instructions via portal. home with medical transport?-SC    ERCP N/A 3/24/2021    Procedure: ERCP (ENDOSCOPIC RETROGRADE CHOLANGIOPANCREATOGRAPHY);  Surgeon: Helio Cheema MD;  Location: [facility] (2ND FLR);  Service: Endoscopy;  Laterality: N/A;    ERCP N/A 4/30/2021    Procedure: ERCP (ENDOSCOPIC RETROGRADE CHOLANGIOPANCREATOGRAPHY);  Surgeon: Boo Gray MD;  Location: [facility] (2ND FLR);  Service: Endoscopy;  Laterality: N/A;    ERCP N/A 7/1/2021    Procedure: ERCP (ENDOSCOPIC RETROGRADE CHOLANGIOPANCREATOGRAPHY);  Surgeon: Helio Cheema MD;  Location: [facility] ENDO (2ND FLR);  Service: Endoscopy;  Laterality: N/A;  Ok for Taxi. Dr Cheema  rapid covid 1130am- tb inst email    ESOPHAGOGASTRODUODENOSCOPY  2015    ESOPHAGOGASTRODUODENOSCOPY N/A 3/4/2021    Procedure: EGD  (ESOPHAGOGASTRODUODENOSCOPY);  Surgeon: Yenifer Ramirez MD;  Location: [facility];  Service: Endoscopy;  Laterality: N/A;    ESOPHAGOGASTRODUODENOSCOPY N/A 3/24/2021    Procedure: EGD (ESOPHAGOGASTRODUODENOSCOPY);  Surgeon: Helio Cheema MD;  Location: [facility] (2ND FLR);  Service: Endoscopy;  Laterality: N/A;    EYE SURGERY  2016    Cataracts    FRACTURE SURGERY  2014    JOINT REPLACEMENT  2007    ORIF FEMUR FRACTURE Left     TOTAL KNEE ARTHROPLASTY Left 2007    TUBAL LIGATION         Review of patient's allergies indicates:   Allergen Reactions    Wellbutrin [bupropion hcl] Other (See Comments)     Hyponatremia and hypomagnesia     Zoloft [sertraline] Other (See Comments)     Hyponatremia and hypomagnesia     Bananas [banana]      Emesis, and stomach cramps    Patient states she is not allergic to Banana; she cannot eat one everyday       No current facility-administered medications on file prior to encounter.     Current Outpatient Medications on File Prior to Encounter   Medication Sig    albuterol (VENTOLIN HFA) 90 mcg/actuation inhaler inhale 1-2 puffs as needed every 6 hours for wheezing and shortness of breath    ALPRAZolam (XANAX) 0.25 MG tablet Take 1 tablet (0.25 mg total) by mouth daily as needed for Anxiety.    ascorbic acid, vitamin C, (VITAMIN C) 250 MG tablet Take 250 mg by mouth once daily.    atorvastatin (LIPITOR) 40 MG tablet TAKE 1 TABLET BY MOUTH EVERY DAILY    azelastine (ASTELIN) 137 mcg (0.1 %) nasal spray Instill 1 spray (137 mcg total) by Nasal route 2 (two) times daily.    b complex vitamins capsule Take 1 capsule by mouth once daily.    biotin 10 mg Tab Take 10 mg by mouth once daily.    calcium carbonate (OS-SANDRINE) 500 mg calcium (1,250 mg) tablet Take 2 tablets (1,000 mg total) by mouth 2 (two) times daily.    cyanocobalamin, vitamin B-12, 1,000 mcg TbSR Take 1,000 mcg by mouth once daily.    denosumab (PROLIA) 60 mg/mL Syrg Inject 1 mL (60 mg total) into the  skin every 6 (six) months.    diltiaZEM (CARDIZEM CD) 180 MG 24 hr capsule Take 1 capsule (180 mg total) by mouth once daily.    DULoxetine (CYMBALTA) 60 MG capsule Take 1 capsule (60 mg total) by mouth once daily.    ferrous sulfate (FEOSOL) 325 mg (65 mg iron) Tab tablet Take 325 mg by mouth daily with breakfast.    fluticasone (FLONASE) 50 mcg/actuation nasal spray 1 spray by Each Nare route 2 (two) times daily as needed for Rhinitis.    fluticasone propion-salmeterol 115-21 mcg/dose (ADVAIR HFA) 115-21 mcg/actuation HFAA inhaler Inhale 2 puffs into the lungs every 12 (twelve) hours.    gabapentin (NEURONTIN) 300 MG capsule Take 1 capsule (300 mg total) by mouth 3 (three) times daily.    guaiFENesin (MUCINEX) 600 mg 12 hr tablet Take 1,200 mg by mouth 2 (two) times daily as needed for Congestion.    HYDROcodone-acetaminophen (NORCO)  mg per tablet Take 1 tablet by mouth every 12 (twelve) hours as needed for Pain.    lutein 40 mg Cap Take by mouth.    magnesium oxide (MAG-OX) 400 mg (241.3 mg magnesium) tablet Take 1 tablet by mouth every 12 (twelve) hours.    metoprolol succinate (TOPROL-XL) 25 MG 24 hr tablet Take 2 tablets (50 mg total) by mouth once daily.    multivit-min/iron/folic/lutein (CENTRUM SILVER WOMEN ORAL) Take 1 tablet by mouth once daily.    ondansetron (ZOFRAN-ODT) 8 MG TbDL dissolve 1 tablet (8 mg total) by mouth every 8 (eight) hours as needed (nausea).    pantoprazole (PROTONIX) 40 MG tablet Take 1 tablet (40 mg total) by mouth once daily.    promethazine (PHENERGAN) 25 MG tablet Take 1 tablet (25 mg total) by mouth every 6 (six) hours as needed for Nausea.    torsemide (DEMADEX) 20 MG Tab Take 1 tablet (20 mg total) by mouth once daily. Take an extra dose daily for 3 days if gains 2-3 pounds in 2-3 days    traZODone (DESYREL) 100 MG tablet Take 1 tablet (100 mg total) by mouth every evening.    umeclidinium (INCRUSE ELLIPTA) 62.5 mcg/actuation inhalation capsule Inhale  1 puff into the lungs once daily. Controller    vitamin D (VITAMIN D3) 1000 units Tab Take 1 tablet (1,000 Units total) by mouth once daily.    ZINC ORAL Take by mouth.     Family History       Problem Relation (Age of Onset)    Alzheimer's disease Mother    Arthritis Father, Brother    Cancer Sister    Colon cancer Brother    Dementia Mother    Depression Mother    Hypertension Mother, Brother    Miscarriages / Stillbirths Sister    Myasthenia gravis Father    No Known Problems Son    Rectal cancer Father          Tobacco Use    Smoking status: Every Day     Packs/day: 1.00     Years: 53.00     Pack years: 53.00     Types: Cigarettes     Start date: 4/30/1967    Smokeless tobacco: Never    Tobacco comments:     I had quit for about 6 months this past year. Then massive stress and started back up sometimes   Substance and Sexual Activity    Alcohol use: Yes     Alcohol/week: 7.0 standard drinks     Types: 7 Drinks containing 0.5 oz of alcohol per week     Comment: at least 1 cocktail a day- vodka    Drug use: No    Sexual activity: Not Currently     Partners: Male     Birth control/protection: Abstinence, Post-menopausal     Review of Systems   Constitutional:  Positive for activity change and fatigue. Negative for appetite change and fever.   HENT:  Negative for congestion, ear pain, rhinorrhea and sinus pressure.    Eyes:  Negative for pain and discharge.   Respiratory:  Negative for cough, chest tightness, shortness of breath and wheezing.    Cardiovascular:  Negative for chest pain and leg swelling.   Gastrointestinal:  Positive for diarrhea. Negative for abdominal distention, abdominal pain, nausea and vomiting.   Endocrine: Negative for cold intolerance and heat intolerance.   Genitourinary:  Negative for difficulty urinating, flank pain, frequency, hematuria and urgency.   Musculoskeletal:  Positive for arthralgias and myalgias. Negative for joint swelling.   Allergic/Immunologic: Negative for  environmental allergies and food allergies.   Neurological:  Positive for weakness. Negative for dizziness, light-headedness and headaches.   Hematological:  Does not bruise/bleed easily.   Psychiatric/Behavioral:  Positive for decreased concentration. Negative for agitation and behavioral problems.    Objective:     Vital Signs (Most Recent):  Temp: 97.6 °F (36.4 °C) (06/04/23 2325)  Pulse: 80 (06/04/23 2323)  Resp: 18 (06/04/23 2323)  BP: (!) 121/55 (06/04/23 2323)  SpO2: 98 % (06/04/23 2323) Vital Signs (24h Range):  Temp:  [97.6 °F (36.4 °C)] 97.6 °F (36.4 °C)  Pulse:  [80] 80  Resp:  [18] 18  SpO2:  [98 %] 98 %  BP: (121)/(55) 121/55        There is no height or weight on file to calculate BMI.     Physical Exam  Constitutional:       Appearance: She is well-developed. She is ill-appearing.      Interventions: Nasal cannula in place.      Comments: Patient disheveled.   HENT:      Head: Normocephalic.   Eyes:      General:         Right eye: No discharge.         Left eye: No discharge.      Conjunctiva/sclera: Conjunctivae normal.   Cardiovascular:      Rate and Rhythm: Normal rate and regular rhythm.      Pulses:           Radial pulses are 2+ on the right side and 2+ on the left side.      Heart sounds: Normal heart sounds.   Pulmonary:      Effort: Pulmonary effort is normal. Tachypnea present. No respiratory distress.      Breath sounds: Examination of the right-middle field reveals decreased breath sounds. Examination of the left-middle field reveals decreased breath sounds. Examination of the right-lower field reveals decreased breath sounds. Examination of the left-lower field reveals decreased breath sounds. Decreased breath sounds present.   Abdominal:      General: Bowel sounds are increased. There is no distension.      Palpations: Abdomen is soft.      Tenderness: There is no abdominal tenderness.   Musculoskeletal:         General: Normal range of motion.      Cervical back: Normal range of motion  and neck supple.   Skin:     General: Skin is warm and dry.   Neurological:      Mental Status: She is lethargic and disoriented.      GCS: GCS eye subscore is 1. GCS verbal subscore is 4. GCS motor subscore is 5.   Psychiatric:         Mood and Affect: Mood is depressed.         Speech: Speech is delayed.         Behavior: Behavior is slowed. Behavior is cooperative.              Significant Labs: All pertinent labs within the past 24 hours have been reviewed.  CBC:   Recent Labs   Lab 06/05/23 0040   WBC 9.45   HGB 11.6*   HCT 30.9*        CMP:   Recent Labs   Lab 06/05/23 0040   *   K 3.8   CL 87*   CO2 30*   GLU 92   BUN 19   CREATININE 1.0   CALCIUM 8.4*   PROT 5.4*   ALBUMIN 2.1*   BILITOT 0.7   ALKPHOS 196*   AST 33   ALT 18   ANIONGAP 11       Significant Imaging: I have reviewed all pertinent imaging results/findings within the past 24 hours.    Assessment/Plan:     * Debility  Patient with increasing weakness, appearance of inability to care for herself.  Reports becoming bedbound and now inability to transfer in and out of her wheelchair.    Consult Social work for discharge needs  Patient cannot participate with PT/OT at this time, so will hold off on consulting at this time.        Somnolence  Patient unable to stay awake during assessment.  Reports taking pain medications and other medications that could cause differing levels of somnolence.  Will hold all of these medications for now to gain a true picture of her independence capabilities.      CKD (chronic kidney disease) stage 3, GFR 30-59 ml/min  Creatinine 1, at baseline    Monitor for acute decompensation      Hyponatremia  Na- 128, mildly decreased from past results.  Likely due decreased nutritional intake.  Possibly due to somnolence.    Encourage better intake  Repeat CMP in AM              Chronic diastolic (congestive) heart failure  Patient is identified as having Diastolic (HFpEF) heart failure that is Chronic. CHF is  currently controlled. Latest ECHO performed and demonstrates- Results for orders placed during the hospital encounter of 02/13/23    Echo    Interpretation Summary  · The left ventricle is normal in size with concentric remodeling and normal systolic function.  · Mild left atrial enlargement.  · The estimated ejection fraction is 60%.  · Grade III left ventricular diastolic dysfunction.  · Normal right ventricular size with normal right ventricular systolic function.  · There is mild aortic valve stenosis.  · Aortic valve area is 1.50 cm2; peak velocity is 3.06 m/s; mean gradient is 22 mmHg.  · Mild tricuspid regurgitation.  . Continue Beta Blocker and ACE/ARB and monitor clinical status closely. Monitor on telemetry. Patient is off CHF pathway.  Monitor strict Is&Os and daily weights.  Place on fluid restriction of 1.5 L.  on diet for CHF. Last BNP reviewed- and noted below   Recent Labs   Lab 06/05/23 0040   *   .  Appears near baseline.  Monitor for acute decompensation    Hyperlipidemia  Continue Lipitor      Essential hypertension  Normotensive currently    Continue cardizem, toprol, torsemide      Chronic obstructive pulmonary disease  Patient appears well compensated.    Continue Breo as interchange  Duonebs PRN        VTE Risk Mitigation (From admission, onward)         Ordered     heparin (porcine) injection 5,000 Units  Every 8 hours         06/05/23 0216     IP VTE HIGH RISK PATIENT  Once         06/05/23 0216     Place sequential compression device  Until discontinued         06/05/23 0216                     Emigdio Holly NP  Department of Hospital Medicine  [facility] - Emergency Dept

## 2023-06-05 NOTE — ED PROVIDER NOTES
Encounter Date: 6/4/2023    SCRIBE #1 NOTE: I, Sonny Mead, am scribing for, and in the presence of,  Dr. Fritz. I have scribed the following portions of the note - Other sections scribed: HPI, ROS.     History     Chief Complaint   Patient presents with    Leg Pain     Pt presents with bilateral leg pain x 16 hours. Pt denies recent trauma/injury.      Nalini Varma is a 72 y.o. female who has a past medical history of Anxiety, Cervical spinal stenosis, Choledocholithiasis, Chronic obstructive pulmonary disease (03/16/2016), Degenerative disc disease of cervical spine, Diverticulosis (04/24/2018), History of alcohol abuse (03/02/2021), History of gastric ulcer, History of L3 compression fracture (12/19/2018), History of lung cancer, Hyperlipidemia, Iron deficiency anemia, Osteoarthritis, and Stage III CKD (2017).    The patient presents to the ED via EMS for evaluation of bilateral leg pain. She reports she has been wheelchair bound for a year and states that she has had increased difficulty caring for herself over the last week. She reports she has been unable to transfer herself to her bed or to the toilet so she has been using a diaper. She called EMS today after she slid out of her wheelchair and was unable to get back into it without assistance. The patient reports she has been taking hydrocodone for the pain.         The history is provided by the patient.   Review of patient's allergies indicates:   Allergen Reactions    Wellbutrin [bupropion hcl] Other (See Comments)     Hyponatremia and hypomagnesia     Zoloft [sertraline] Other (See Comments)     Hyponatremia and hypomagnesia     Bananas [banana]      Emesis, and stomach cramps    Patient states she is not allergic to Banana; she cannot eat one everyday     Past Medical History:   Diagnosis Date    Anxiety     Cervical spinal stenosis     Choledocholithiasis     Chronic obstructive pulmonary disease 03/16/2016    Degenerative disc disease of  cervical spine     Diverticulosis 04/24/2018    History of alcohol abuse 03/02/2021    History of gastric ulcer     History of L3 compression fracture 12/19/2018    History of lung cancer     s/p completion of XRT in 10/2020    Hyperlipidemia     Iron deficiency anemia     Osteoarthritis     Stage III CKD 2017     Past Surgical History:   Procedure Laterality Date    BREAST CYST EXCISION Right     1967    CATARACT EXTRACTION      COLONOSCOPY  2015    COLONOSCOPY N/A 6/8/2022    Procedure: COLONOSCOPY;  Surgeon: Helio Cheema MD;  Location: [facility] (2ND FLR);  Service: Endoscopy;  Laterality: N/A;  5/25-Pt requesting Dr. Cheema-approval given per Dr. Cheema-ml  Fully vaccinated, prep instr portal -ml    CORONARY ANGIOGRAPHY N/A 7/20/2018    Procedure: ANGIOGRAM, CORONARY ARTERY;  Surgeon: Willard Roberts MD;  Location: [facility] CATH LAB;  Service: Cardiovascular;  Laterality: N/A;    ENDOSCOPIC ULTRASOUND OF UPPER GASTROINTESTINAL TRACT N/A 3/24/2021    Procedure: ULTRASOUND, UPPER GI TRACT, ENDOSCOPIC;  Surgeon: Helio Cheema MD;  Location: [facility] (2ND FLR);  Service: Endoscopy;  Laterality: N/A;    ENDOSCOPIC ULTRASOUND OF UPPER GASTROINTESTINAL TRACT N/A 4/25/2022    Procedure: ULTRASOUND, UPPER GI TRACT, ENDOSCOPIC;  Surgeon: Helio Cheema MD;  Location: [facility] (2ND FLR);  Service: Endoscopy;  Laterality: N/A;  cardiac risk assessment 1 per Dr. Ortez. see t/e 3/17-SC  3/25:new instructions via portal. home with medical transport?-SC    ERCP N/A 3/24/2021    Procedure: ERCP (ENDOSCOPIC RETROGRADE CHOLANGIOPANCREATOGRAPHY);  Surgeon: Helio Cheema MD;  Location: [facility] (2ND FLR);  Service: Endoscopy;  Laterality: N/A;    ERCP N/A 4/30/2021    Procedure: ERCP (ENDOSCOPIC RETROGRADE CHOLANGIOPANCREATOGRAPHY);  Surgeon: Boo Gray MD;  Location: [facility] ENDO (2ND FLR);  Service: Endoscopy;  Laterality: N/A;    ERCP N/A 7/1/2021    Procedure: ERCP (ENDOSCOPIC RETROGRADE  CHOLANGIOPANCREATOGRAPHY);  Surgeon: Helio Cheema MD;  [facility]  Service: Endoscopy;  Laterality: N/A;  Ok for Taxi. Dr Cheema  rapid covid 1130am- tb inst email    ESOPHAGOGASTRODUODENOSCOPY  2015    ESOPHAGOGASTRODUODENOSCOPY N/A 3/4/2021    Procedure: EGD (ESOPHAGOGASTRODUODENOSCOPY);  Surgeon: Yenifer Ramirez MD;  Location: [facility] [place] [place];  Service: Endoscopy;  Laterality: N/A;    ESOPHAGOGASTRODUODENOSCOPY N/A 3/24/2021    Procedure: EGD (ESOPHAGOGASTRODUODENOSCOPY);  Surgeon: Helio Cheema MD;  [facility]  Service: Endoscopy;  Laterality: N/A;    EYE SURGERY  2016    Cataracts    FRACTURE SURGERY  2014    JOINT REPLACEMENT  2007    ORIF FEMUR FRACTURE Left     TOTAL KNEE ARTHROPLASTY Left 2007    TUBAL LIGATION       Family History   Problem Relation Age of Onset    Hypertension Mother     Alzheimer's disease Mother     Dementia Mother     Depression Mother         Alzheimers    Myasthenia gravis Father     Arthritis Father         Myasthenia Gravis    Rectal cancer Father     Hypertension Brother     Arthritis Brother         Colon cancer, obese, high blood pressure    Colon cancer Brother     No Known Problems Son     Cancer Sister         Brain cancer    Miscarriages / Stillbirths Sister     Melanoma Neg Hx     Breast cancer Neg Hx      Social History     Tobacco Use    Smoking status: Every Day     Packs/day: 1.00     Years: 53.00     Pack years: 53.00     Types: Cigarettes     Start date: 4/30/1967    Smokeless tobacco: Never    Tobacco comments:     I had quit for about 6 months this past year. Then massive stress and started back up sometimes   Substance Use Topics    Alcohol use: Yes     Alcohol/week: 7.0 standard drinks     Types: 7 Drinks containing 0.5 oz of alcohol per week     Comment: at least 1 cocktail a day- vodka    Drug use: No     Review of Systems   Unable to perform ROS: Mental status change     Physical Exam     Initial Vitals   BP Pulse  Resp Temp SpO2   06/04/23 2323 06/04/23 2323 06/04/23 2323 06/04/23 2325 06/04/23 2323   (!) 121/55 80 18 97.6 °F (36.4 °C) 98 %      MAP       --                Physical Exam    Nursing note and vitals reviewed.  Constitutional: She is cooperative.   Patient is somewhat somnolent, but arousable, poor personal hygiene noted with incontinence.  Nasal cannula in place   HENT:   Head: Atraumatic.   Eyes: Conjunctivae and lids are normal.   Neck:   Normal range of motion.  Cardiovascular:  Normal rate.           Musculoskeletal:         General: Edema present.      Cervical back: Normal range of motion.     Neurological: She is alert.   Skin: No rash noted.   Dried stool present to B feet   Psychiatric: Her speech is delayed.   Flat affect, slurred speech       ED Course   Procedures  Labs Reviewed   CBC W/ AUTO DIFFERENTIAL   COMPREHENSIVE METABOLIC PANEL   B-TYPE NATRIURETIC PEPTIDE   MAGNESIUM   URINALYSIS, REFLEX TO URINE CULTURE   TROPONIN I   CK   SARS-COV-2 RDRP GENE   POCT INFLUENZA A/B MOLECULAR          Imaging Results    None          Medications - No data to display  Medical Decision Making:   Initial Assessment:   Urgent evaluation of 72-year-old female with non-small cell lung cancer status post radiation, COPD- on oxygen, CHF, hypertension, and CKD 3 here with generalized weakness.  Patient is wheelchair-bound at her baseline, lives alone presents today given inability to transfer from her wheelchair and unable to stand for the last week.  Patient last hospitalized for CHF 2/23.  Here on examination the patient appears intermittently somnolent, endorses taking hydrocodone this afternoon.  Patient has diffuse nonpitting edema to bilateral upper and lower extremities, notable poor personal hygiene with dried stool present to bilateral feet.  It appears the patient is unable to care for self more ambulate independently at this time.  Will plan to evaluate for etiology of weakness and likely admit given need  for increased services/placement.  Differential Diagnosis:   COVID, dehydration, fluid overload, metabolic derangements, influenza, CHF exacerbation, rhabdomyolysis  Clinical Tests:   Lab Tests: Ordered and Reviewed  Radiological Study: Ordered and Reviewed  Medical Tests: Ordered and Reviewed  ED Management:  12:20 AM  Endorsed to Dr Ritchie, will plan to follow up labs, admit given inability to safely dc home in this condition v fluid overload, dehydration, possible rhabdo....        Scribe Attestation:   Scribe #1: I performed the above scribed service and the documentation accurately describes the services I performed. I attest to the accuracy of the note.            Physician Attestation for Scribe: I, Catrina, reviewed documentation as scribed in my presence, which is both accurate and complete.         Clinical Impression:   Final diagnoses:  [R53.1] Weakness               Vashti Fritz MD  07/05/23 1747

## 2023-06-05 NOTE — ED TRIAGE NOTES
Pt BIBA, c/o pain in lower extremities, reports decreased mobility, uses a wheelchair but unable now to move from bed to wheelchair, legs edematous and tight up to thighs, feet dirty ++++, pt vague on how they got so soiled, pt foul smelling, excoriation in groin, pt on Home O2, 3.5 litres, current smoker

## 2023-06-05 NOTE — ASSESSMENT & PLAN NOTE
- mainly bed/wheelchair bound  - transfers herself in/out of wheelchair  - she understands she needs more help, but hopes to remain as independent as possible. She does not wish to go to any nursing home, but mentioned being open to home health aide or sort at home for extra support  - pt/ot for evaluation    Muscle Hinge Flap Text: The defect edges were debeveled with a #15 scalpel blade.  Given the size, depth and location of the defect and the proximity to free margins a muscle hinge flap was deemed most appropriate.  Using a sterile surgical marker, an appropriate hinge flap was drawn incorporating the defect. The area thus outlined was incised with a #15 scalpel blade.  The skin margins were undermined to an appropriate distance in all directions utilizing iris scissors.

## 2023-06-05 NOTE — PROGRESS NOTES
O2 Device/Concentration: Flow (L/min): 2,  ,  , Flow (L/min): 2    Plan of Care: Patient resting well  Oxygen in use at 2lpm nasal cannula

## 2023-06-05 NOTE — SUBJECTIVE & OBJECTIVE
Past Medical History:   Diagnosis Date    Anxiety     Cervical spinal stenosis     Choledocholithiasis     Chronic obstructive pulmonary disease 03/16/2016    Degenerative disc disease of cervical spine     Diverticulosis 04/24/2018    History of alcohol abuse 03/02/2021    History of gastric ulcer     History of L3 compression fracture 12/19/2018    History of lung cancer     s/p completion of XRT in 10/2020    Hyperlipidemia     Iron deficiency anemia     Osteoarthritis     Stage III CKD 2017       Past Surgical History:   Procedure Laterality Date    BREAST CYST EXCISION Right     1967    CATARACT EXTRACTION      COLONOSCOPY  2015    COLONOSCOPY N/A 6/8/2022    Procedure: COLONOSCOPY;  Surgeon: Helio Cheema MD;  Location: [facility] ([address] [place] [address]);  Service: Endoscopy;  Laterality: N/A;  5/25-Pt requesting Dr. Cheema-approval given per Dr. Cheema-ml  Fully vaccinated, prep instr portal -ml    CORONARY ANGIOGRAPHY N/A 7/20/2018    Procedure: ANGIOGRAM, CORONARY ARTERY;  Surgeon: Willard Roberts MD;  Location: [facility] CATH LAB;  Service: Cardiovascular;  Laterality: N/A;    ENDOSCOPIC ULTRASOUND OF UPPER GASTROINTESTINAL TRACT N/A 3/24/2021    Procedure: ULTRASOUND, UPPER GI TRACT, ENDOSCOPIC;  Surgeon: Helio Cheema MD;  Location: [address]);  Service: Endoscopy;  Laterality: N/A;    ENDOSCOPIC ULTRASOUND OF UPPER GASTROINTESTINAL TRACT N/A 4/25/2022    Procedure: ULTRASOUND, UPPER GI TRACT, ENDOSCOPIC;  Surgeon: Helio Cheema MD;  [address]  Service: Endoscopy;  Laterality: N/A;  cardiac risk assessment 1 per Dr. Ortez. see t/e 3/17-SC  3/25:new instructions via portal. home with medical transport?-SC    ERCP N/A 3/24/2021    Procedure: ERCP (ENDOSCOPIC RETROGRADE CHOLANGIOPANCREATOGRAPHY);  Surgeon: Helio Cheema MD;  [facility]  Service: Endoscopy;  Laterality: N/A;    ERCP N/A 4/30/2021    Procedure: ERCP (ENDOSCOPIC RETROGRADE  CHOLANGIOPANCREATOGRAPHY);  Surgeon: Boo Gray MD;  Location: [facility] ENDO (2ND FLR);  Service: Endoscopy;  Laterality: N/A;    ERCP N/A 7/1/2021    Procedure: ERCP (ENDOSCOPIC RETROGRADE CHOLANGIOPANCREATOGRAPHY);  Surgeon: Helio Cheema MD;  Location: [facility] ENDO (2ND FLR);  Service: Endoscopy;  Laterality: N/A;  Ok for Taxi. Dr Cheema  rapid covid 1130am- tb inst email    ESOPHAGOGASTRODUODENOSCOPY  2015    ESOPHAGOGASTRODUODENOSCOPY N/A 3/4/2021    Procedure: EGD (ESOPHAGOGASTRODUODENOSCOPY);  Surgeon: Yenifer Ramirez MD;  Location: [facility];  Service: Endoscopy;  Laterality: N/A;    ESOPHAGOGASTRODUODENOSCOPY N/A 3/24/2021    Procedure: EGD (ESOPHAGOGASTRODUODENOSCOPY);  Surgeon: Helio Cheema MD;  Location: [facility] (2ND FLR);  Service: Endoscopy;  Laterality: N/A;    EYE SURGERY  2016    Cataracts    FRACTURE SURGERY  2014    JOINT REPLACEMENT  2007    ORIF FEMUR FRACTURE Left     TOTAL KNEE ARTHROPLASTY Left 2007    TUBAL LIGATION         Review of patient's allergies indicates:   Allergen Reactions    Wellbutrin [bupropion hcl] Other (See Comments)     Hyponatremia and hypomagnesia     Zoloft [sertraline] Other (See Comments)     Hyponatremia and hypomagnesia     Bananas [banana]      Emesis, and stomach cramps    Patient states she is not allergic to Banana; she cannot eat one everyday       No current facility-administered medications on file prior to encounter.     Current Outpatient Medications on File Prior to Encounter   Medication Sig    albuterol (VENTOLIN HFA) 90 mcg/actuation inhaler inhale 1-2 puffs as needed every 6 hours for wheezing and shortness of breath    ALPRAZolam (XANAX) 0.25 MG tablet Take 1 tablet (0.25 mg total) by mouth daily as needed for Anxiety.    ascorbic acid, vitamin C, (VITAMIN C) 250 MG tablet Take 250 mg by mouth once daily.    atorvastatin (LIPITOR) 40 MG tablet TAKE 1 TABLET BY MOUTH EVERY DAILY    azelastine (ASTELIN) 137 mcg (0.1 %) nasal spray Instill 1  spray (137 mcg total) by Nasal route 2 (two) times daily.    b complex vitamins capsule Take 1 capsule by mouth once daily.    biotin 10 mg Tab Take 10 mg by mouth once daily.    calcium carbonate (OS-SANDRINE) 500 mg calcium (1,250 mg) tablet Take 2 tablets (1,000 mg total) by mouth 2 (two) times daily.    cyanocobalamin, vitamin B-12, 1,000 mcg TbSR Take 1,000 mcg by mouth once daily.    denosumab (PROLIA) 60 mg/mL Syrg Inject 1 mL (60 mg total) into the skin every 6 (six) months.    diltiaZEM (CARDIZEM CD) 180 MG 24 hr capsule Take 1 capsule (180 mg total) by mouth once daily.    DULoxetine (CYMBALTA) 60 MG capsule Take 1 capsule (60 mg total) by mouth once daily.    ferrous sulfate (FEOSOL) 325 mg (65 mg iron) Tab tablet Take 325 mg by mouth daily with breakfast.    fluticasone (FLONASE) 50 mcg/actuation nasal spray 1 spray by Each Nare route 2 (two) times daily as needed for Rhinitis.    fluticasone propion-salmeterol 115-21 mcg/dose (ADVAIR HFA) 115-21 mcg/actuation HFAA inhaler Inhale 2 puffs into the lungs every 12 (twelve) hours.    gabapentin (NEURONTIN) 300 MG capsule Take 1 capsule (300 mg total) by mouth 3 (three) times daily.    guaiFENesin (MUCINEX) 600 mg 12 hr tablet Take 1,200 mg by mouth 2 (two) times daily as needed for Congestion.    HYDROcodone-acetaminophen (NORCO)  mg per tablet Take 1 tablet by mouth every 12 (twelve) hours as needed for Pain.    lutein 40 mg Cap Take by mouth.    magnesium oxide (MAG-OX) 400 mg (241.3 mg magnesium) tablet Take 1 tablet by mouth every 12 (twelve) hours.    metoprolol succinate (TOPROL-XL) 25 MG 24 hr tablet Take 2 tablets (50 mg total) by mouth once daily.    multivit-min/iron/folic/lutein (CENTRUM SILVER WOMEN ORAL) Take 1 tablet by mouth once daily.    ondansetron (ZOFRAN-ODT) 8 MG TbDL dissolve 1 tablet (8 mg total) by mouth every 8 (eight) hours as needed (nausea).    pantoprazole (PROTONIX) 40 MG tablet Take 1 tablet (40 mg total) by mouth once daily.     promethazine (PHENERGAN) 25 MG tablet Take 1 tablet (25 mg total) by mouth every 6 (six) hours as needed for Nausea.    torsemide (DEMADEX) 20 MG Tab Take 1 tablet (20 mg total) by mouth once daily. Take an extra dose daily for 3 days if gains 2-3 pounds in 2-3 days    traZODone (DESYREL) 100 MG tablet Take 1 tablet (100 mg total) by mouth every evening.    umeclidinium (INCRUSE ELLIPTA) 62.5 mcg/actuation inhalation capsule Inhale 1 puff into the lungs once daily. Controller    vitamin D (VITAMIN D3) 1000 units Tab Take 1 tablet (1,000 Units total) by mouth once daily.    ZINC ORAL Take by mouth.     Family History       Problem Relation (Age of Onset)    Alzheimer's disease Mother    Arthritis Father, Brother    Cancer Sister    Colon cancer Brother    Dementia Mother    Depression Mother    Hypertension Mother, Brother    Miscarriages / Stillbirths Sister    Myasthenia gravis Father    No Known Problems Son    Rectal cancer Father          Tobacco Use    Smoking status: Every Day     Packs/day: 1.00     Years: 53.00     Pack years: 53.00     Types: Cigarettes     Start date: 4/30/1967    Smokeless tobacco: Never    Tobacco comments:     I had quit for about 6 months this past year. Then massive stress and started back up sometimes   Substance and Sexual Activity    Alcohol use: Yes     Alcohol/week: 7.0 standard drinks     Types: 7 Drinks containing 0.5 oz of alcohol per week     Comment: at least 1 cocktail a day- vodka    Drug use: No    Sexual activity: Not Currently     Partners: Male     Birth control/protection: Abstinence, Post-menopausal     Review of Systems   Constitutional:  Positive for activity change and fatigue. Negative for appetite change and fever.   HENT:  Negative for congestion, ear pain, rhinorrhea and sinus pressure.    Eyes:  Negative for pain and discharge.   Respiratory:  Negative for cough, chest tightness, shortness of breath and wheezing.    Cardiovascular:  Negative for chest pain  and leg swelling.   Gastrointestinal:  Positive for diarrhea. Negative for abdominal distention, abdominal pain, nausea and vomiting.   Endocrine: Negative for cold intolerance and heat intolerance.   Genitourinary:  Negative for difficulty urinating, flank pain, frequency, hematuria and urgency.   Musculoskeletal:  Positive for arthralgias and myalgias. Negative for joint swelling.   Allergic/Immunologic: Negative for environmental allergies and food allergies.   Neurological:  Positive for weakness. Negative for dizziness, light-headedness and headaches.   Hematological:  Does not bruise/bleed easily.   Psychiatric/Behavioral:  Positive for decreased concentration. Negative for agitation and behavioral problems.    Objective:     Vital Signs (Most Recent):  Temp: 97.6 °F (36.4 °C) (06/04/23 2325)  Pulse: 80 (06/04/23 2323)  Resp: 18 (06/04/23 2323)  BP: (!) 121/55 (06/04/23 2323)  SpO2: 98 % (06/04/23 2323) Vital Signs (24h Range):  Temp:  [97.6 °F (36.4 °C)] 97.6 °F (36.4 °C)  Pulse:  [80] 80  Resp:  [18] 18  SpO2:  [98 %] 98 %  BP: (121)/(55) 121/55        There is no height or weight on file to calculate BMI.     Physical Exam  Constitutional:       Appearance: She is well-developed. She is ill-appearing.      Interventions: Nasal cannula in place.      Comments: Patient disheveled.   HENT:      Head: Normocephalic.   Eyes:      General:         Right eye: No discharge.         Left eye: No discharge.      Conjunctiva/sclera: Conjunctivae normal.   Cardiovascular:      Rate and Rhythm: Normal rate and regular rhythm.      Pulses:           Radial pulses are 2+ on the right side and 2+ on the left side.      Heart sounds: Normal heart sounds.   Pulmonary:      Effort: Pulmonary effort is normal. Tachypnea present. No respiratory distress.      Breath sounds: Examination of the right-middle field reveals decreased breath sounds. Examination of the left-middle field reveals decreased breath sounds. Examination of  the right-lower field reveals decreased breath sounds. Examination of the left-lower field reveals decreased breath sounds. Decreased breath sounds present.   Abdominal:      General: Bowel sounds are increased. There is no distension.      Palpations: Abdomen is soft.      Tenderness: There is no abdominal tenderness.   Musculoskeletal:         General: Normal range of motion.      Cervical back: Normal range of motion and neck supple.   Skin:     General: Skin is warm and dry.   Neurological:      Mental Status: She is lethargic and disoriented.      GCS: GCS eye subscore is 1. GCS verbal subscore is 4. GCS motor subscore is 5.   Psychiatric:         Mood and Affect: Mood is depressed.         Speech: Speech is delayed.         Behavior: Behavior is slowed. Behavior is cooperative.              Significant Labs: All pertinent labs within the past 24 hours have been reviewed.  CBC:   Recent Labs   Lab 06/05/23  0040   WBC 9.45   HGB 11.6*   HCT 30.9*        CMP:   Recent Labs   Lab 06/05/23  0040   *   K 3.8   CL 87*   CO2 30*   GLU 92   BUN 19   CREATININE 1.0   CALCIUM 8.4*   PROT 5.4*   ALBUMIN 2.1*   BILITOT 0.7   ALKPHOS 196*   AST 33   ALT 18   ANIONGAP 11       Significant Imaging: I have reviewed all pertinent imaging results/findings within the past 24 hours.

## 2023-06-05 NOTE — CARE UPDATE
06/05/23 0857   Patient Assessment/Suction   Level of Consciousness (AVPU) responds to voice   Respiratory Effort Normal;Unlabored   Expansion/Accessory Muscles/Retractions no use of accessory muscles   All Lung Fields Breath Sounds diminished   Skin Integrity   $ Wound Care Tech Time 15 min   Area Observed Left;Right;Behind ear;Nares   Skin Appearance without discoloration   PRE-TX-O2   Device (Oxygen Therapy) nasal cannula   $ Is the patient on Low Flow Oxygen? Yes   Flow (L/min) 2   SpO2 100 %   Pulse Oximetry Type Continuous   $ Pulse Oximetry - Multiple Charge Pulse Oximetry - Multiple   Pulse 70   Resp 19   Inhaler   $ Inhaler Charges MDI (Metered Dose Inahler) Treatment;Mouth rinsed post treatment   Daily Review of Necessity (Inhaler) completed   Respiratory Treatment Status (Inhaler) given   Treatment Route (Inhaler) mouthpiece   Patient Position (Inhaler) Caimlle's   Post Treatment Assessment (Inhaler) breath sounds unchanged   Signs of Intolerance (Inhaler) none

## 2023-06-05 NOTE — ASSESSMENT & PLAN NOTE
- noted hx  - echo 2/2023: The estimated ejection fraction is 60% and has Grade III left ventricular diastolic dysfunction.

## 2023-06-05 NOTE — ASSESSMENT & PLAN NOTE
- tired due to coming in to the ED late last night and not getting any sleep in the ED overnight she states

## 2023-06-05 NOTE — SUBJECTIVE & OBJECTIVE
Past Medical History:   Diagnosis Date    Anxiety     Cervical spinal stenosis     Choledocholithiasis     Chronic obstructive pulmonary disease 03/16/2016    Degenerative disc disease of cervical spine     Diverticulosis 04/24/2018    History of alcohol abuse 03/02/2021    History of gastric ulcer     History of L3 compression fracture 12/19/2018    History of lung cancer     s/p completion of XRT in 10/2020    Hyperlipidemia     Iron deficiency anemia     Osteoarthritis     Stage III CKD 2017       Past Surgical History:   Procedure Laterality Date    BREAST CYST EXCISION Right     1967    CATARACT EXTRACTION      COLONOSCOPY  2015    COLONOSCOPY N/A 6/8/2022    Procedure: COLONOSCOPY;  Surgeon: Helio Cheema MD;  Location: [facility] ([address] [place] [address]);  Service: Endoscopy;  Laterality: N/A;  5/25-Pt requesting Dr. Cheema-approval given per Dr. Cheema-ml  Fully vaccinated, prep instr portal -ml    CORONARY ANGIOGRAPHY N/A 7/20/2018    Procedure: ANGIOGRAM, CORONARY ARTERY;  Surgeon: Willard Roberts MD;  Location: [facility] CATH [facility];  Service: Cardiovascular;  Laterality: N/A;    ENDOSCOPIC ULTRASOUND OF UPPER GASTROINTESTINAL TRACT N/A 3/24/2021    Procedure: ULTRASOUND, UPPER GI TRACT, ENDOSCOPIC;  Surgeon: Helio Cheema MD;  Location: [address]);  Service: Endoscopy;  Laterality: N/A;    ENDOSCOPIC ULTRASOUND OF UPPER GASTROINTESTINAL TRACT N/A 4/25/2022    Procedure: ULTRASOUND, UPPER GI TRACT, ENDOSCOPIC;  Surgeon: Helio Cheema MD;  [address]  Service: Endoscopy;  Laterality: N/A;  cardiac risk assessment 1 per Dr. Ortez. see t/e 3/17-SC  3/25:new instructions via portal. home with medical transport?-SC    ERCP N/A 3/24/2021    Procedure: ERCP (ENDOSCOPIC RETROGRADE CHOLANGIOPANCREATOGRAPHY);  Surgeon: Helio Cheema MD;  [facility]  Service: Endoscopy;  Laterality: N/A;    ERCP N/A 4/30/2021    Procedure: ERCP (ENDOSCOPIC RETROGRADE  CHOLANGIOPANCREATOGRAPHY);  Surgeon: Boo Gray MD;  Location: [facility] ENDO (2ND FLR);  Service: Endoscopy;  Laterality: N/A;    ERCP N/A 7/1/2021    Procedure: ERCP (ENDOSCOPIC RETROGRADE CHOLANGIOPANCREATOGRAPHY);  Surgeon: Helio Cheema MD;  Location: [facility] ENDO (2ND FLR);  Service: Endoscopy;  Laterality: N/A;  Ok for Taxi. Dr Cheema  rapid covid 1130am- tb inst email    ESOPHAGOGASTRODUODENOSCOPY  2015    ESOPHAGOGASTRODUODENOSCOPY N/A 3/4/2021    Procedure: EGD (ESOPHAGOGASTRODUODENOSCOPY);  Surgeon: Yenifer Ramirez MD;  Location: [facility];  Service: Endoscopy;  Laterality: N/A;    ESOPHAGOGASTRODUODENOSCOPY N/A 3/24/2021    Procedure: EGD (ESOPHAGOGASTRODUODENOSCOPY);  Surgeon: Helio Cheema MD;  Location: [facility] (2ND FLR);  Service: Endoscopy;  Laterality: N/A;    EYE SURGERY  2016    Cataracts    FRACTURE SURGERY  2014    JOINT REPLACEMENT  2007    ORIF FEMUR FRACTURE Left     TOTAL KNEE ARTHROPLASTY Left 2007    TUBAL LIGATION         Review of patient's allergies indicates:   Allergen Reactions    Wellbutrin [bupropion hcl] Other (See Comments)     Hyponatremia and hypomagnesia     Zoloft [sertraline] Other (See Comments)     Hyponatremia and hypomagnesia     Bananas [banana]      Emesis, and stomach cramps    Patient states she is not allergic to Banana; she cannot eat one everyday       Medications:  Scheduled Meds:   ascorbic acid (vitamin C)  250 mg Oral Daily    atorvastatin  40 mg Oral Daily    cyanocobalamin  1,000 mcg Oral Daily    diltiaZEM  180 mg Oral Daily    DULoxetine  60 mg Oral Daily    ferrous sulfate  1 tablet Oral Daily    fluticasone furoate-vilanteroL  1 puff Inhalation Daily    heparin (porcine)  5,000 Units Subcutaneous Q8H    magnesium oxide  400 mg Oral BID    metoprolol succinate  50 mg Oral Daily    multivitamin  1 tablet Oral Daily    torsemide  20 mg Oral Daily    vitamin D  1,000 Units Oral Daily     PRN Meds:acetaminophen, albuterol-ipratropium, naloxone,  ondansetron, sodium chloride 0.9%    Family History       Problem Relation (Age of Onset)    Alzheimer's disease Mother    Arthritis Father, Brother    Cancer Sister    Colon cancer Brother    Dementia Mother    Depression Mother    Hypertension Mother, Brother    Miscarriages / Stillbirths Sister    Myasthenia gravis Father    No Known Problems Son    Rectal cancer Father          Tobacco Use    Smoking status: Every Day     Packs/day: 1.00     Years: 53.00     Pack years: 53.00     Types: Cigarettes     Start date: 4/30/1967    Smokeless tobacco: Never    Tobacco comments:     I had quit for about 6 months this past year. Then massive stress and started back up sometimes   Substance and Sexual Activity    Alcohol use: Yes     Alcohol/week: 7.0 standard drinks     Types: 7 Drinks containing 0.5 oz of alcohol per week     Comment: at least 1 cocktail a day- vodka    Drug use: No    Sexual activity: Not Currently     Partners: Male     Birth control/protection: Abstinence, Post-menopausal       Objective:     Vital Signs (Most Recent):  Temp: 97.5 °F (36.4 °C) (06/05/23 0816)  Pulse: 70 (06/05/23 1024)  Resp: 19 (06/05/23 0857)  BP: (!) 151/64 (06/05/23 1024)  SpO2: 100 % (06/05/23 0857) Vital Signs (24h Range):  Temp:  [97.5 °F (36.4 °C)-97.8 °F (36.6 °C)] 97.5 °F (36.4 °C)  Pulse:  [70-80] 70  Resp:  [18-19] 19  SpO2:  [98 %-100 %] 100 %  BP: (121-157)/(55-67) 151/64        There is no height or weight on file to calculate BMI.       Physical Exam  Constitutional:       General: She is not in acute distress.     Appearance: She is ill-appearing.      Comments: Lying in bed resting comfortably, but easily arousable    HENT:      Head: Normocephalic and atraumatic.   Eyes:      Extraocular Movements: Extraocular movements intact.      Comments: Eyes open/closed intermittently during conversation   Pulmonary:      Comments: NC in place, some increased work of breathing at times during conversation  Skin:     General:  Skin is warm.   Neurological:      Comments: Tired, but easily arousable to awaken and conversate.           Review of Symptoms        Living Arrangements:  Lives alone    Psychosocial/Cultural:   See Palliative Psychosocial Note: Yes  - lives alone at home  - has a friend (Zafar) that comes and checks on her  - mainly in wheelchair/ bed bound   - doesn't do too much, enjoys staying home and watching tv  - 2 sons who live in   **Primary  to Follow**  Palliative Care  Consult: No      Advance Care Planning   Advance Directives:   Medical Power of : Yes      Decision Making:  Patient answered questions  Goals of Care: What is most important right now is to focus on remaining as independent as possible and have improvement in condition. Accordingly, we have decided that the best plan to meet the patient's goals includes continuing with treatment.       Significant Labs: reviewed  CBC:   Recent Labs   Lab 06/05/23  0608   WBC 7.62   HGB 9.9*   HCT 29.2*   MCV 95        BMP:  Recent Labs   Lab 06/05/23  0608   GLU 98   *   K 3.8   CL 89*   CO2 29   BUN 18   CREATININE 0.9   CALCIUM 7.8*   MG 1.9     LFT:  Lab Results   Component Value Date    AST 32 06/05/2023    ALKPHOS 175 (H) 06/05/2023    BILITOT 0.5 06/05/2023     Albumin:   Albumin   Date Value Ref Range Status   06/05/2023 1.8 (L) 3.5 - 5.2 g/dL Final     Protein:   Total Protein   Date Value Ref Range Status   06/05/2023 4.8 (L) 6.0 - 8.4 g/dL Final     Lactic acid:   Lab Results   Component Value Date    LACTATE 1.1 02/13/2023    LACTATE 0.7 01/17/2023       Significant Imaging: reviewed

## 2023-06-05 NOTE — PLAN OF CARE
FLACA spoke with the CM for [facility], [place], [phone].  She stated that the patient called EMS because she could not transfer herself from the  to the toilet.  She said when the EMS arrived they found the patient living in deplorable conditions (ie 3 foot pile of dirty diapers on floor and total infestation of insects including large and small cock roaches). She has already reported this case to the [facility] [employer] ([facility]).  She says that patient should not be living alone.  She also stated the patient lives at the [facility].  FLACA reported this information to the DM director and will follow up with EPS.

## 2023-06-05 NOTE — NURSING
Nurses Note -- 4 Eyes      6/5/2023   3:46 PM      Skin assessed during: Admit      [] No Altered Skin Integrity Present    []Prevention Measures Documented      [x] Yes- Altered Skin Integrity Present or Discovered   [x] LDA Added if Not in Epic (Describe Wound)   [x] New Altered Skin Integrity was Present on Admit and Documented in LDA   [x] Wound Image Taken    Wound Care Consulted? Yes    Attending Nurse:  Pattie Elliott RN     Second RN/Staff Member:  Rabia Darden RN

## 2023-06-05 NOTE — PLAN OF CARE
Spoke with Aniyah from [facility], [phone], regarding pt's open case with them.  She stated that she will come and assess the patient on this Wednesday. She asked if patient has capacity to make her own decisions.  She also would like to know if patient is receptive about going to live in a NH.  CM to follow up.

## 2023-06-05 NOTE — HPI
"From H&P by Emigdio Holly NP:  "The patient is a 72 y.o. female with a past medical history of Chronic obstructive pulmonary disease, Hyperlipidemia, Iron deficiency anemia, and Stage III CKD who presents for evaluation of bilateral leg pain. She reports she has been wheelchair bound for a year and states that she has had increased difficulty caring for herself over the last week. She reports she has been unable to transfer herself to her bed or to the toilet so she has been using a diaper. She called EMS today after she slid out of her wheelchair and was unable to get back into it without assistance. The patient reports she has been taking hydrocodone for the pain.  On initial workup, the patient is noted to have a low Na of 128 and appears disheveled.  The patient states she came to the hospital because she can't take care of herself anymore and would like placement for assistance."  "

## 2023-06-05 NOTE — CONSULTS
[facility] - Med Surg ([address])  Palliative Medicine  Consult Note    Patient Name: Nalini Varma  MRN: [id number]  Admission Date: 6/4/2023  Hospital Length of Stay: 0 days  Code Status: Full Code   Attending Provider: Breana Mckinney MD  Consulting Provider: Lucho Gutierrez MD  Primary Care Physician: Yane Sahni MD  Principal Problem:Debility    Patient information was obtained from patient, past medical records and primary team.      Inpatient consult to Palliative Care  Consult performed by: Lucho Gutierrez MD  Consult ordered by: Breana Mckinney MD  Reason for consult: advanced care planning         Assessment/Plan:     Neuro  Somnolence  - tired due to coming in to the ED late last night and not getting any sleep in the ED overnight she states    Pulmonary  Chronic obstructive pulmonary disease  - noted hx  - on home O2 (2L)    Cardiac/Vascular  Chronic diastolic (congestive) heart failure  - noted hx  - echo 2/2023: The estimated ejection fraction is 60% and has Grade III left ventricular diastolic dysfunction.    Renal/  CKD (chronic kidney disease) stage 3, GFR 30-59 ml/min  - noted    Oncology  Recurrent adenocarcinoma of left lung  - unclear if still needs to undergo further chemotherapy/radiation  - has missed last 2 oncology follow up appointments  - patient is of the belief that she is clear of the cancer after completing chemo/radiation with follow up with oncology 6/21/23 she states  - discussed with primary team who will reach out to oncology for further insight  [] next steps pending oncology insight   [] will tailor future conversations based on oncology plan going forward    Palliative Care  Advance care planning  6/5/2023  - chart reviewed in depth and discussed with primary team  - patient seen at bedside   - she was initially sleeping, but was easily arousable. She remembered me from her last admission  - she was okay with me sitting and talking with her, but admits she was  tired from a long night  - stated she was feeling cold, so I got her a couple extra blankets to help  - when discussing what led to her coming in she stated the main reason for her coming in was her fall and inability to get back up  - I asked her if she feels things had been getting more difficult at home and she admits maybe somewhat, but feels she had been doing well with her breathing at least   - her main source of help seems to be a friend (Zafar) who actually called and checked in on her while I was in the room too. He offered to take out her trash which she agreed would be helpful and thanked him  - I asked her if she had updated her 2 sons who are in  regarding her ongoing and increasing difficulty, to which she denied  - I asked if she was okay with me giving them a call to update them and she said no, she would prefer to call them herself  - encouraged her to reach out to her sons and update them  - talked about how she had been considering moving to  when we last met and she said that is still a possibility  - when we discussed regarding her lung cancer with the radiation/chemotherapy she stated as far as she knows she is cured of it and does not need anymore chemotherapy or radiation   - I let her know I will follow up with primary team regarding that as they will be reaching out to oncology  - when asked about her thoughts when looking forward she feels her primary goal is staying as independent as possible and is willing to do what is needed to make that happen. She agrees that things have been getting more difficult at home, but stated she does not want to go to a nursing home. She is however open to getting more help at home in the form of a home health aide such as what she previously had.    - as our conversation progressed she expressed her desire to rest. Concluded by encouraging her to talk with her sons and updating them regarding how she is doing, she agreed.   [] primary team to discuss  "with oncology regarding if she is clear of the lung cancer recurrence or if there were plans for further chemo/radiation. Unclear from chart review. Patient believes she has completed chemo/radiation. This will tailor future discussions and options for next steps.   [] encouraged her to call her sons (live in ) and make them aware of her need for more support. She had considered possibility of moving out there to be closer to them.   [] continue with current full management  [] HCPOA documents already on file from last visit with son Augie #1 and Romain #2  [] will follow up tomorrow     Other  * Debility  - mainly bed/wheelchair bound  - transfers herself in/out of wheelchair  - she understands she needs more help, but hopes to remain as independent as possible. She does not wish to go to any nursing home, but mentioned being open to home health aide or sort at home for extra support  - pt/ot for evaluation         Thank you for your consult. I will follow-up with patient. Please contact us if you have any additional questions.    Subjective:     HPI:   Per H&P: " The patient is a 72 y.o. female with a past medical history of Chronic obstructive pulmonary disease, Hyperlipidemia, Iron deficiency anemia, and Stage III CKD who presents for evaluation of bilateral leg pain. She reports she has been wheelchair bound for a year and states that she has had increased difficulty caring for herself over the last week. She reports she has been unable to transfer herself to her bed or to the toilet so she has been using a diaper. She called EMS today after she slid out of her wheelchair and was unable to get back into it without assistance. The patient reports she has been taking hydrocodone for the pain.  On initial workup, the patient is noted to have a low Na of 128 and appears disheveled.  The patient states she came to the hospital because she can't take care of herself anymore and would like placement for " "assistance."    Palliative care consulted for assistance with advanced care planning and support.       Hospital Course:  No notes on file      Past Medical History:   Diagnosis Date    Anxiety     Cervical spinal stenosis     Choledocholithiasis     Chronic obstructive pulmonary disease 03/16/2016    Degenerative disc disease of cervical spine     Diverticulosis 04/24/2018    History of alcohol abuse 03/02/2021    History of gastric ulcer     History of L3 compression fracture 12/19/2018    History of lung cancer     s/p completion of XRT in 10/2020    Hyperlipidemia     Iron deficiency anemia     Osteoarthritis     Stage III CKD 2017       Past Surgical History:   Procedure Laterality Date    BREAST CYST EXCISION Right     1967    CATARACT EXTRACTION      COLONOSCOPY  2015    COLONOSCOPY N/A 6/8/2022    Procedure: COLONOSCOPY;  Surgeon: Helio Cheema MD;  Location: [facility] ([address] [place] [address]);  Service: Endoscopy;  Laterality: N/A;  5/25-Pt requesting Dr. Cheema-approval given per Dr. Cheema-ml  Fully vaccinated, prep instr portal -ml    CORONARY ANGIOGRAPHY N/A 7/20/2018    Procedure: ANGIOGRAM, CORONARY ARTERY;  Surgeon: Willard Roberts MD;  Location: [facility] CATH LAB;  Service: Cardiovascular;  Laterality: N/A;    ENDOSCOPIC ULTRASOUND OF UPPER GASTROINTESTINAL TRACT N/A 3/24/2021    Procedure: ULTRASOUND, UPPER GI TRACT, ENDOSCOPIC;  Surgeon: Helio Cheema MD;  Location: [address]);  Service: Endoscopy;  Laterality: N/A;    ENDOSCOPIC ULTRASOUND OF UPPER GASTROINTESTINAL TRACT N/A 4/25/2022    Procedure: ULTRASOUND, UPPER GI TRACT, ENDOSCOPIC;  Surgeon: Helio Cheema MD;  Location: [address]);  Service: Endoscopy;  Laterality: N/A;  cardiac risk assessment 1 per Dr. Ortez. see t/e 3/17-SC  3/25:new instructions via portal. home with medical transport?-SC    ERCP N/A 3/24/2021    Procedure: ERCP (ENDOSCOPIC RETROGRADE CHOLANGIOPANCREATOGRAPHY);  Surgeon: Helio" JESSA Cheema MD;  Location: [facility] (2ND FLR);  Service: Endoscopy;  Laterality: N/A;    ERCP N/A 4/30/2021    Procedure: ERCP (ENDOSCOPIC RETROGRADE CHOLANGIOPANCREATOGRAPHY);  Surgeon: Boo Gray MD;  Location: [facility] (2ND FLR);  Service: Endoscopy;  Laterality: N/A;    ERCP N/A 7/1/2021    Procedure: ERCP (ENDOSCOPIC RETROGRADE CHOLANGIOPANCREATOGRAPHY);  Surgeon: Helio Cheema MD;  Location: [facility] (2ND FLR);  Service: Endoscopy;  Laterality: N/A;  Ok for Taxi. Dr Cheema  rapid covid 1130am- tb inst email    ESOPHAGOGASTRODUODENOSCOPY  2015    ESOPHAGOGASTRODUODENOSCOPY N/A 3/4/2021    Procedure: EGD (ESOPHAGOGASTRODUODENOSCOPY);  Surgeon: Yenifer Ramirez MD;  Location: [facility];  Service: Endoscopy;  Laterality: N/A;    ESOPHAGOGASTRODUODENOSCOPY N/A 3/24/2021    Procedure: EGD (ESOPHAGOGASTRODUODENOSCOPY);  Surgeon: Helio Cheema MD;  Location: [facility] (2ND FLR);  Service: Endoscopy;  Laterality: N/A;    EYE SURGERY  2016    Cataracts    FRACTURE SURGERY  2014    JOINT REPLACEMENT  2007    ORIF FEMUR FRACTURE Left     TOTAL KNEE ARTHROPLASTY Left 2007    TUBAL LIGATION         Review of patient's allergies indicates:   Allergen Reactions    Wellbutrin [bupropion hcl] Other (See Comments)     Hyponatremia and hypomagnesia     Zoloft [sertraline] Other (See Comments)     Hyponatremia and hypomagnesia     Bananas [banana]      Emesis, and stomach cramps    Patient states she is not allergic to Banana; she cannot eat one everyday       Medications:  Scheduled Meds:   ascorbic acid (vitamin C)  250 mg Oral Daily    atorvastatin  40 mg Oral Daily    cyanocobalamin  1,000 mcg Oral Daily    diltiaZEM  180 mg Oral Daily    DULoxetine  60 mg Oral Daily    ferrous sulfate  1 tablet Oral Daily    fluticasone furoate-vilanteroL  1 puff Inhalation Daily    heparin (porcine)  5,000 Units Subcutaneous Q8H    magnesium oxide  400 mg Oral BID    metoprolol succinate  50 mg Oral  Daily    multivitamin  1 tablet Oral Daily    torsemide  20 mg Oral Daily    vitamin D  1,000 Units Oral Daily     PRN Meds:acetaminophen, albuterol-ipratropium, naloxone, ondansetron, sodium chloride 0.9%    Family History       Problem Relation (Age of Onset)    Alzheimer's disease Mother    Arthritis Father, Brother    Cancer Sister    Colon cancer Brother    Dementia Mother    Depression Mother    Hypertension Mother, Brother    Miscarriages / Stillbirths Sister    Myasthenia gravis Father    No Known Problems Son    Rectal cancer Father          Tobacco Use    Smoking status: Every Day     Packs/day: 1.00     Years: 53.00     Pack years: 53.00     Types: Cigarettes     Start date: 4/30/1967    Smokeless tobacco: Never    Tobacco comments:     I had quit for about 6 months this past year. Then massive stress and started back up sometimes   Substance and Sexual Activity    Alcohol use: Yes     Alcohol/week: 7.0 standard drinks     Types: 7 Drinks containing 0.5 oz of alcohol per week     Comment: at least 1 cocktail a day- vodka    Drug use: No    Sexual activity: Not Currently     Partners: Male     Birth control/protection: Abstinence, Post-menopausal       Objective:     Vital Signs (Most Recent):  Temp: 97.5 °F (36.4 °C) (06/05/23 0816)  Pulse: 70 (06/05/23 1024)  Resp: 19 (06/05/23 0857)  BP: (!) 151/64 (06/05/23 1024)  SpO2: 100 % (06/05/23 0857) Vital Signs (24h Range):  Temp:  [97.5 °F (36.4 °C)-97.8 °F (36.6 °C)] 97.5 °F (36.4 °C)  Pulse:  [70-80] 70  Resp:  [18-19] 19  SpO2:  [98 %-100 %] 100 %  BP: (121-157)/(55-67) 151/64        There is no height or weight on file to calculate BMI.       Physical Exam  Constitutional:       General: She is not in acute distress.     Appearance: She is ill-appearing.      Comments: Lying in bed resting comfortably, but easily arousable    HENT:      Head: Normocephalic and atraumatic.   Eyes:      Extraocular Movements: Extraocular movements intact.       Comments: Eyes open/closed intermittently during conversation   Pulmonary:      Comments: NC in place, some increased work of breathing at times during conversation  Skin:     General: Skin is warm.   Neurological:      Comments: Tired, but easily arousable to awaken and conversate.           Review of Symptoms        Living Arrangements:  Lives alone    Psychosocial/Cultural:   See Palliative Psychosocial Note: Yes  - lives alone at home  - has a friend (Zafar) that comes and checks on her  - mainly in wheelchair/ bed bound   - doesn't do too much, enjoys staying home and watching tv  - 2 sons who live in   **Primary  to Follow**  Palliative Care  Consult: No      Advance Care Planning   Advance Directives:   Medical Power of : Yes      Decision Making:  Patient answered questions  Goals of Care: What is most important right now is to focus on remaining as independent as possible and have improvement in condition. Accordingly, we have decided that the best plan to meet the patient's goals includes continuing with treatment.       Significant Labs: reviewed  CBC:   Recent Labs   Lab 06/05/23  0608   WBC 7.62   HGB 9.9*   HCT 29.2*   MCV 95        BMP:  Recent Labs   Lab 06/05/23  0608   GLU 98   *   K 3.8   CL 89*   CO2 29   BUN 18   CREATININE 0.9   CALCIUM 7.8*   MG 1.9     LFT:  Lab Results   Component Value Date    AST 32 06/05/2023    ALKPHOS 175 (H) 06/05/2023    BILITOT 0.5 06/05/2023     Albumin:   Albumin   Date Value Ref Range Status   06/05/2023 1.8 (L) 3.5 - 5.2 g/dL Final     Protein:   Total Protein   Date Value Ref Range Status   06/05/2023 4.8 (L) 6.0 - 8.4 g/dL Final     Lactic acid:   Lab Results   Component Value Date    LACTATE 1.1 02/13/2023    LACTATE 0.7 01/17/2023       Significant Imaging: reviewed        > 50% of 70 min visit spent in chart review, face to face discussion of goals of care,  symptom assessment, coordination of care and  emotional support.    Lucho Gutierrez MD  Palliative Medicine  [facility] - Med Surg ([address])

## 2023-06-05 NOTE — CONSULTS
[facility] - Med Surg ([address])  Wound Care    Patient Name:  Nalini Varma   MRN:  [id number]  Date: 6/5/2023  Diagnosis: Debility    History:     Past Medical History:   Diagnosis Date    Anxiety     Cervical spinal stenosis     Choledocholithiasis     Chronic obstructive pulmonary disease 03/16/2016    Degenerative disc disease of cervical spine     Diverticulosis 04/24/2018    History of alcohol abuse 03/02/2021    History of gastric ulcer     History of L3 compression fracture 12/19/2018    History of lung cancer     s/p completion of XRT in 10/2020    Hyperlipidemia     Iron deficiency anemia     Osteoarthritis     Stage III CKD 2017       Social History     Socioeconomic History    Marital status: Single    Number of children: 2   Occupational History    Occupation: unemployed   Tobacco Use    Smoking status: Every Day     Packs/day: 1.00     Years: 53.00     Pack years: 53.00     Types: Cigarettes     Start date: 4/30/1967    Smokeless tobacco: Never    Tobacco comments:     I had quit for about 6 months this past year. Then massive stress and started back up sometimes   Substance and Sexual Activity    Alcohol use: Yes     Alcohol/week: 7.0 standard drinks     Types: 7 Drinks containing 0.5 oz of alcohol per week     Comment: at least 1 cocktail a day- vodka    Drug use: No    Sexual activity: Not Currently     Partners: Male     Birth control/protection: Abstinence, Post-menopausal   Other Topics Concern    Are you pregnant or think you may be? No   Social History Narrative    Originally from [place]    Living in [facility] since 1998    Living in [facility]     Social Determinants of Health     Financial Resource Strain: Medium Risk    Difficulty of Paying Living Expenses: Somewhat hard   Food Insecurity: Food Insecurity Present    Worried About Running Out of Food in the Last Year: Sometimes true    Ran Out of Food in the Last Year: Sometimes true   Transportation Needs: Unmet Transportation Needs     Lack of Transportation (Medical): Yes    Lack of Transportation (Non-Medical): Yes   Physical Activity: Insufficiently Active    Days of Exercise per Week: 4 days    Minutes of Exercise per Session: 30 min   Stress: No Stress Concern Present    Feeling of Stress : Only a little   Social Connections: Socially Isolated    Frequency of Communication with Friends and Family: Once a week    Frequency of Social Gatherings with Friends and Family: More than three times a week    Attends Gnosticism Services: Never    Active Member of Clubs or Organizations: No    Attends Club or Organization Meetings: Never    Marital Status:    Housing Stability: High Risk    Unable to Pay for Housing in the Last Year: Yes    Number of Places Lived in the Last Year: 2    Unstable Housing in the Last Year: Yes           [facility] Assessment Details:        06/05/23 1412        Altered Skin Integrity anterior Incontinence associated dermatitis   No Date First Assessed or Time First Assessed found.   Altered Skin Integrity Present on Admission - Did Patient arrive to the hospital with altered skin?: yes  Orientation: anterior  Primary Wound Type: Incontinence associated dermatitis   Wound Image     Drainage Characteristics/Odor Malodorous;Creamy   Appearance Wet;Red   Tissue loss description Partial thickness   Red (%), Wound Tissue Color 100 %   Periwound Area Edematous   Wound Edges Undefined   Care Cleansed with:   Periwound Care Cleansed with pH balanced cleanser;Topical treatment applied   Dressing Change Due 06/05/23 06/05/23 1412        Altered Skin Integrity posterior Incontinence associated dermatitis   No Date First Assessed or Time First Assessed found.   Altered Skin Integrity Present on Admission - Did Patient arrive to the hospital with altered skin?: yes  Orientation: posterior  Primary Wound Type: Incontinence associated dermatitis   Wound Image     Drainage Characteristics/Odor Malodorous   Appearance Wet   Tissue loss  description Partial thickness   Wound Edges Undefined   IAD, diaper dermatitis with fungal satellite rash present, order placed for Critic Aid AF ointment, BID, and as needed.     06/05/23 1410        Altered Skin Integrity Right posterior Thigh #7 Other (comment)   No Date First Assessed or Time First Assessed found.   Altered Skin Integrity Present on Admission - Did Patient arrive to the hospital with altered skin?: yes  Side: Right  Orientation: posterior  Location: Thigh  Wound Number: #7  Primary Wound Type...   Wound Image      Description of Altered Skin Integrity Full thickness tissue loss. Base is covered by slough and/or eschar in the wound bed   Tissue loss description Full thickness   Yellow (%), Wound Tissue Color 100 %   Periwound Area Redness;Swelling;Edematous;Excoriated;Moist   Wound Edges Open   Care Wound cleanser;Applied:;Skin Barrier   Dressing Applied;Silicone;Foam  (Triad hydrophilic paste)   Dressing Change Due 06/06/23   Right thigh, slough in wound bed- noted 7 of theses present. Cleansed with Vashe patted dry, applied triad and covered with silicone foam dressing.  Appears to be a mixture of shearing, incontinence,and pressure.     06/05/23 1409        Altered Skin Integrity Buttocks Purple or maroon localized area of discolored intact skin or non-intact skin or a blood-filled blister.   No Date First Assessed or Time First Assessed found.   Altered Skin Integrity Present on Admission - Did Patient arrive to the hospital with altered skin?: yes  Location: Buttocks  Description of Altered Skin Integrity: Purple or maroon localized area...   Wound Image     Deep tissue injury, pressure relieved with turning schedule and adequate support surface implemented     06/05/23 1413        Altered Skin Integrity Right medial Foot   No Date First Assessed or Time First Assessed found.   Altered Skin Integrity Present on Admission - Did Patient arrive to the hospital with altered skin?: yes  Side:  Right  Orientation: medial  Location: Foot   Wound Image     Tissue loss description Full thickness   RLE, healing ulceration.  Cleansed with Vashe applied Triad and covered with silicone foam dressing.    Pressure injury preventions, adequate support surface, and registered dietician consultation ordered.  Concerning for inhabitable hygiene practices and living conditions

## 2023-06-05 NOTE — ASSESSMENT & PLAN NOTE
- unclear if still needs to undergo further chemotherapy/radiation  - has missed last 2 oncology follow up appointments  - patient is of the belief that she is clear of the cancer after completing chemo/radiation with follow up with oncology 6/21/23 she states  - discussed with primary team who will reach out to oncology for further insight  [] next steps pending oncology insight   [] will tailor future conversations based on oncology plan going forward

## 2023-06-05 NOTE — ASSESSMENT & PLAN NOTE
Patient is identified as having Diastolic (HFpEF) heart failure that is Chronic. CHF is currently controlled. Latest ECHO performed and demonstrates- Results for orders placed during the hospital encounter of 02/13/23    Echo    Interpretation Summary  · The left ventricle is normal in size with concentric remodeling and normal systolic function.  · Mild left atrial enlargement.  · The estimated ejection fraction is 60%.  · Grade III left ventricular diastolic dysfunction.  · Normal right ventricular size with normal right ventricular systolic function.  · There is mild aortic valve stenosis.  · Aortic valve area is 1.50 cm2; peak velocity is 3.06 m/s; mean gradient is 22 mmHg.  · Mild tricuspid regurgitation.  . Continue Beta Blocker and ACE/ARB and monitor clinical status closely. Monitor on telemetry. Patient is off CHF pathway.  Monitor strict Is&Os and daily weights.  Place on fluid restriction of 1.5 L.  on diet for CHF. Last BNP reviewed- and noted below   Recent Labs   Lab 06/05/23  0040   *   .  Appears near baseline.  Monitor for acute decompensation

## 2023-06-05 NOTE — HPI
"Per H&P: " The patient is a 72 y.o. female with a past medical history of Chronic obstructive pulmonary disease, Hyperlipidemia, Iron deficiency anemia, and Stage III CKD who presents for evaluation of bilateral leg pain. She reports she has been wheelchair bound for a year and states that she has had increased difficulty caring for herself over the last week. She reports she has been unable to transfer herself to her bed or to the toilet so she has been using a diaper. She called EMS today after she slid out of her wheelchair and was unable to get back into it without assistance. The patient reports she has been taking hydrocodone for the pain.  On initial workup, the patient is noted to have a low Na of 128 and appears disheveled.  The patient states she came to the hospital because she can't take care of herself anymore and would like placement for assistance."    Palliative care consulted for assistance with advanced care planning and support.   "

## 2023-06-05 NOTE — PLAN OF CARE
Problem: Skin Injury Risk Increased  Goal: Skin Health and Integrity  Outcome: Ongoing, Progressing     Problem: Adult Inpatient Plan of Care  Goal: Plan of Care Review  Outcome: Ongoing, Progressing  Goal: Patient-Specific Goal (Individualized)  Outcome: Ongoing, Progressing  Goal: Absence of Hospital-Acquired Illness or Injury  Outcome: Ongoing, Progressing  Goal: Optimal Comfort and Wellbeing  Outcome: Ongoing, Progressing  Goal: Readiness for Transition of Care  Outcome: Ongoing, Progressing     Problem: Coping Ineffective  Goal: Effective Coping  Outcome: Ongoing, Progressing     Problem: Impaired Wound Healing  Goal: Optimal Wound Healing  Outcome: Ongoing, Progressing

## 2023-06-05 NOTE — ASSESSMENT & PLAN NOTE
Na- 128, mildly decreased from past results.  Likely due decreased nutritional intake.  Possibly due to somnolence.    Encourage better intake  Repeat CMP in AM

## 2023-06-05 NOTE — ASSESSMENT & PLAN NOTE
Patient unable to stay awake during assessment.  Reports taking pain medications and other medications that could cause differing levels of somnolence.  Will hold all of these medications for now to gain a true picture of her independence capabilities.

## 2023-06-05 NOTE — ASSESSMENT & PLAN NOTE
Patient with increasing weakness, appearance of inability to care for herself.  Reports becoming bedbound and now inability to transfer in and out of her wheelchair.    Consult Social work for discharge needs  Patient cannot participate with PT/OT at this time, so will hold off on consulting at this time.

## 2023-06-05 NOTE — ASSESSMENT & PLAN NOTE
6/5/2023  - chart reviewed in depth and discussed with primary team  - patient seen at bedside   - she was initially sleeping, but was easily arousable. She remembered me from her last admission  - she was okay with me sitting and talking with her, but admits she was tired from a long night  - stated she was feeling cold, so I got her a couple extra blankets to help  - when discussing what led to her coming in she stated the main reason for her coming in was her fall and inability to get back up  - I asked her if she feels things had been getting more difficult at home and she admits maybe somewhat, but feels she had been doing well with her breathing at least   - her main source of help seems to be a friend (Zafar) who actually called and checked in on her while I was in the room too. He offered to take out her trash which she agreed would be helpful and thanked him  - I asked her if she had updated her 2 sons who are in  regarding her ongoing and increasing difficulty, to which she denied  - I asked if she was okay with me giving them a call to update them and she said no, she would prefer to call them herself  - encouraged her to reach out to her sons and update them  - talked about how she had been considering moving to  when we last met and she said that is still a possibility  - when we discussed regarding her lung cancer with the radiation/chemotherapy she stated as far as she knows she is cured of it and does not need anymore chemotherapy or radiation   - I let her know I will follow up with primary team regarding that as they will be reaching out to oncology  - when asked about her thoughts when looking forward she feels her primary goal is staying as independent as possible and is willing to do what is needed to make that happen. She agrees that things have been getting more difficult at home, but stated she does not want to go to a nursing home. She is however open to getting more help at home in the  form of a home health aide such as what she previously had.    - as our conversation progressed she expressed her desire to rest. Concluded by encouraging her to talk with her sons and updating them regarding how she is doing, she agreed.   [] primary team to discuss with oncology regarding if she is clear of the lung cancer recurrence or if there were plans for further chemo/radiation. Unclear from chart review. Patient believes she has completed chemo/radiation. This will tailor future discussions and options for next steps.   [] encouraged her to call her sons (live in ) and make them aware of her need for more support. She had considered possibility of moving out there to be closer to them.   [] continue with current full management  [] HCPOA documents already on file from last visit with son Augie #1 and Romain #2  [] will follow up tomorrow

## 2023-06-05 NOTE — PLAN OF CARE
06/05/23 1707   Discharge Assessment   Assessment Type Discharge Planning Assessment   Confirmed/corrected address, phone number and insurance Yes   Confirmed Demographics Correct on Facesheet   Source of Information patient   People in Home alone   Do you expect to return to your current living situation? Yes   Do you have help at home or someone to help you manage your care at home? No   Prior to hospitilization cognitive status: Alert/Oriented   Current cognitive status: Alert/Oriented   Walking or Climbing Stairs ambulation difficulty, requires equipment  (Patient is WC bound.)   Dressing/Bathing bathing difficulty, assistance 1 person  (Needs help bathing.)   Equipment Currently Used at Home wheelchair;rollator   Readmission within 30 days? Yes   Patient currently being followed by outpatient case management? No   Do you currently have service(s) that help you manage your care at home? No   Do you take prescription medications? Yes   Do you have prescription coverage? Yes   Do you have any problems affording any of your prescribed medications? No   Is the patient taking medications as prescribed? yes   How do you get to doctors appointments? other (see comments)  (Cab)   Are you on dialysis? No   Do you take coumadin? No   Discharge Plan A Home   Discharge Plan B Home Health   DME Needed Upon Discharge  none   Discharge Plan discussed with: Patient     Pt stated she does not want to go to live in a NH. She would rather go back to her apt and live.  She states she does not have family or friends to depend on.  PCP is correct in chart.  She stated she [facility] at [facility].

## 2023-06-06 ENCOUNTER — TELEPHONE (OUTPATIENT)
Dept: INTERNAL MEDICINE | Facility: CLINIC | Age: 73
End: 2023-06-06
Payer: MEDICARE

## 2023-06-06 LAB
ALBUMIN SERPL BCP-MCNC: 1.4 G/DL (ref 3.5–5.2)
ALP SERPL-CCNC: 147 U/L (ref 55–135)
ALT SERPL W/O P-5'-P-CCNC: 15 U/L (ref 10–44)
ANION GAP SERPL CALC-SCNC: 8 MMOL/L (ref 8–16)
AST SERPL-CCNC: 31 U/L (ref 10–40)
BASOPHILS # BLD AUTO: 0.01 K/UL (ref 0–0.2)
BASOPHILS NFR BLD: 0.2 % (ref 0–1.9)
BILIRUB SERPL-MCNC: 0.4 MG/DL (ref 0.1–1)
BUN SERPL-MCNC: 15 MG/DL (ref 8–23)
CALCIUM SERPL-MCNC: 7.4 MG/DL (ref 8.7–10.5)
CHLORIDE SERPL-SCNC: 92 MMOL/L (ref 95–110)
CO2 SERPL-SCNC: 30 MMOL/L (ref 23–29)
CREAT SERPL-MCNC: 0.7 MG/DL (ref 0.5–1.4)
DIFFERENTIAL METHOD: ABNORMAL
EOSINOPHIL # BLD AUTO: 0.1 K/UL (ref 0–0.5)
EOSINOPHIL NFR BLD: 1.4 % (ref 0–8)
ERYTHROCYTE [DISTWIDTH] IN BLOOD BY AUTOMATED COUNT: 17.1 % (ref 11.5–14.5)
EST. GFR  (NO RACE VARIABLE): >60 ML/MIN/1.73 M^2
GLUCOSE SERPL-MCNC: 77 MG/DL (ref 70–110)
HCT VFR BLD AUTO: 25.6 % (ref 37–48.5)
HGB BLD-MCNC: 8.8 G/DL (ref 12–16)
IMM GRANULOCYTES # BLD AUTO: 0.03 K/UL (ref 0–0.04)
IMM GRANULOCYTES NFR BLD AUTO: 0.5 % (ref 0–0.5)
LYMPHOCYTES # BLD AUTO: 1.2 K/UL (ref 1–4.8)
LYMPHOCYTES NFR BLD: 18.9 % (ref 18–48)
MAGNESIUM SERPL-MCNC: 1.6 MG/DL (ref 1.6–2.6)
MCH RBC QN AUTO: 31.9 PG (ref 27–31)
MCHC RBC AUTO-ENTMCNC: 34.4 G/DL (ref 32–36)
MCV RBC AUTO: 93 FL (ref 82–98)
MONOCYTES # BLD AUTO: 0.8 K/UL (ref 0.3–1)
MONOCYTES NFR BLD: 12.3 % (ref 4–15)
NEUTROPHILS # BLD AUTO: 4.4 K/UL (ref 1.8–7.7)
NEUTROPHILS NFR BLD: 66.7 % (ref 38–73)
NRBC BLD-RTO: 0 /100 WBC
PHOSPHATE SERPL-MCNC: 2.8 MG/DL (ref 2.7–4.5)
PLATELET # BLD AUTO: 236 K/UL (ref 150–450)
PMV BLD AUTO: 9.3 FL (ref 9.2–12.9)
POTASSIUM SERPL-SCNC: 3.6 MMOL/L (ref 3.5–5.1)
PROT SERPL-MCNC: 3.9 G/DL (ref 6–8.4)
RBC # BLD AUTO: 2.76 M/UL (ref 4–5.4)
SODIUM SERPL-SCNC: 130 MMOL/L (ref 136–145)
WBC # BLD AUTO: 6.57 K/UL (ref 3.9–12.7)

## 2023-06-06 PROCEDURE — 36415 COLL VENOUS BLD VENIPUNCTURE: CPT | Performed by: NURSE PRACTITIONER

## 2023-06-06 PROCEDURE — 25000003 PHARM REV CODE 250: Performed by: HOSPITALIST

## 2023-06-06 PROCEDURE — 63600175 PHARM REV CODE 636 W HCPCS: Performed by: INTERNAL MEDICINE

## 2023-06-06 PROCEDURE — 99497 ADVNCD CARE PLAN 30 MIN: CPT | Mod: ,,, | Performed by: STUDENT IN AN ORGANIZED HEALTH CARE EDUCATION/TRAINING PROGRAM

## 2023-06-06 PROCEDURE — 99233 SBSQ HOSP IP/OBS HIGH 50: CPT | Mod: ,,, | Performed by: STUDENT IN AN ORGANIZED HEALTH CARE EDUCATION/TRAINING PROGRAM

## 2023-06-06 PROCEDURE — 97535 SELF CARE MNGMENT TRAINING: CPT

## 2023-06-06 PROCEDURE — 27000221 HC OXYGEN, UP TO 24 HOURS

## 2023-06-06 PROCEDURE — 11000001 HC ACUTE MED/SURG PRIVATE ROOM

## 2023-06-06 PROCEDURE — 97162 PT EVAL MOD COMPLEX 30 MIN: CPT

## 2023-06-06 PROCEDURE — 99232 SBSQ HOSP IP/OBS MODERATE 35: CPT | Mod: ,,, | Performed by: INTERNAL MEDICINE

## 2023-06-06 PROCEDURE — 25000242 PHARM REV CODE 250 ALT 637 W/ HCPCS: Performed by: NURSE PRACTITIONER

## 2023-06-06 PROCEDURE — 94761 N-INVAS EAR/PLS OXIMETRY MLT: CPT

## 2023-06-06 PROCEDURE — 99232 PR SUBSEQUENT HOSPITAL CARE,LEVL II: ICD-10-PCS | Mod: ,,, | Performed by: INTERNAL MEDICINE

## 2023-06-06 PROCEDURE — 94640 AIRWAY INHALATION TREATMENT: CPT

## 2023-06-06 PROCEDURE — 25000003 PHARM REV CODE 250: Performed by: INTERNAL MEDICINE

## 2023-06-06 PROCEDURE — 83735 ASSAY OF MAGNESIUM: CPT | Performed by: NURSE PRACTITIONER

## 2023-06-06 PROCEDURE — 85025 COMPLETE CBC W/AUTO DIFF WBC: CPT | Performed by: NURSE PRACTITIONER

## 2023-06-06 PROCEDURE — 99900035 HC TECH TIME PER 15 MIN (STAT)

## 2023-06-06 PROCEDURE — 80053 COMPREHEN METABOLIC PANEL: CPT | Performed by: NURSE PRACTITIONER

## 2023-06-06 PROCEDURE — 84100 ASSAY OF PHOSPHORUS: CPT | Performed by: NURSE PRACTITIONER

## 2023-06-06 PROCEDURE — 97530 THERAPEUTIC ACTIVITIES: CPT

## 2023-06-06 PROCEDURE — 63600175 PHARM REV CODE 636 W HCPCS: Performed by: NURSE PRACTITIONER

## 2023-06-06 PROCEDURE — 99233 PR SUBSEQUENT HOSPITAL CARE,LEVL III: ICD-10-PCS | Mod: ,,, | Performed by: STUDENT IN AN ORGANIZED HEALTH CARE EDUCATION/TRAINING PROGRAM

## 2023-06-06 PROCEDURE — 97165 OT EVAL LOW COMPLEX 30 MIN: CPT

## 2023-06-06 PROCEDURE — 99497 PR ADVNCD CARE PLAN 30 MIN: ICD-10-PCS | Mod: ,,, | Performed by: STUDENT IN AN ORGANIZED HEALTH CARE EDUCATION/TRAINING PROGRAM

## 2023-06-06 PROCEDURE — 25000003 PHARM REV CODE 250: Performed by: NURSE PRACTITIONER

## 2023-06-06 RX ORDER — HYDROCODONE BITARTRATE AND ACETAMINOPHEN 5; 325 MG/1; MG/1
1 TABLET ORAL EVERY 6 HOURS PRN
Status: DISCONTINUED | OUTPATIENT
Start: 2023-06-06 | End: 2023-06-11

## 2023-06-06 RX ADMIN — IPRATROPIUM BROMIDE AND ALBUTEROL SULFATE 3 ML: .5; 3 SOLUTION RESPIRATORY (INHALATION) at 02:06

## 2023-06-06 RX ADMIN — IPRATROPIUM BROMIDE AND ALBUTEROL SULFATE 3 ML: .5; 3 SOLUTION RESPIRATORY (INHALATION) at 08:06

## 2023-06-06 RX ADMIN — HEPARIN SODIUM 5000 UNITS: 5000 INJECTION INTRAVENOUS; SUBCUTANEOUS at 04:06

## 2023-06-06 RX ADMIN — ACETAMINOPHEN 650 MG: 325 TABLET, FILM COATED ORAL at 03:06

## 2023-06-06 RX ADMIN — CEFTRIAXONE 1 G: 1 INJECTION, POWDER, FOR SOLUTION INTRAMUSCULAR; INTRAVENOUS at 04:06

## 2023-06-06 RX ADMIN — METOPROLOL SUCCINATE 50 MG: 50 TABLET, EXTENDED RELEASE ORAL at 09:06

## 2023-06-06 RX ADMIN — HYDROCODONE BITARTRATE AND ACETAMINOPHEN 1 TABLET: 5; 325 TABLET ORAL at 03:06

## 2023-06-06 RX ADMIN — HYDROCODONE BITARTRATE AND ACETAMINOPHEN 1 TABLET: 5; 325 TABLET ORAL at 06:06

## 2023-06-06 RX ADMIN — HYDROCODONE BITARTRATE AND ACETAMINOPHEN 1 TABLET: 5; 325 TABLET ORAL at 10:06

## 2023-06-06 RX ADMIN — ATORVASTATIN CALCIUM 40 MG: 20 TABLET, FILM COATED ORAL at 09:06

## 2023-06-06 RX ADMIN — DILTIAZEM HYDROCHLORIDE 180 MG: 180 CAPSULE, COATED, EXTENDED RELEASE ORAL at 09:06

## 2023-06-06 RX ADMIN — MICONAZOLE NITRATE: 20 OINTMENT TOPICAL at 09:06

## 2023-06-06 RX ADMIN — Medication 250 MG: at 09:06

## 2023-06-06 RX ADMIN — Medication 1000 UNITS: at 09:06

## 2023-06-06 RX ADMIN — IPRATROPIUM BROMIDE AND ALBUTEROL SULFATE 3 ML: .5; 3 SOLUTION RESPIRATORY (INHALATION) at 10:06

## 2023-06-06 RX ADMIN — TORSEMIDE 20 MG: 20 TABLET ORAL at 09:06

## 2023-06-06 RX ADMIN — HEPARIN SODIUM 5000 UNITS: 5000 INJECTION INTRAVENOUS; SUBCUTANEOUS at 05:06

## 2023-06-06 RX ADMIN — MICONAZOLE NITRATE: 20 OINTMENT TOPICAL at 10:06

## 2023-06-06 RX ADMIN — THERA TABS 1 TABLET: TAB at 09:06

## 2023-06-06 RX ADMIN — CYANOCOBALAMIN TAB 1000 MCG 1000 MCG: 1000 TAB at 09:06

## 2023-06-06 RX ADMIN — DULOXETINE 60 MG: 30 CAPSULE, DELAYED RELEASE ORAL at 09:06

## 2023-06-06 RX ADMIN — FERROUS SULFATE TAB 325 MG (65 MG ELEMENTAL FE) 1 EACH: 325 (65 FE) TAB at 09:06

## 2023-06-06 RX ADMIN — Medication 400 MG: at 10:06

## 2023-06-06 RX ADMIN — FLUTICASONE FUROATE AND VILANTEROL TRIFENATATE 1 PUFF: 100; 25 POWDER RESPIRATORY (INHALATION) at 08:06

## 2023-06-06 RX ADMIN — Medication 400 MG: at 09:06

## 2023-06-06 RX ADMIN — HEPARIN SODIUM 5000 UNITS: 5000 INJECTION INTRAVENOUS; SUBCUTANEOUS at 10:06

## 2023-06-06 NOTE — PLAN OF CARE
Problem: Occupational Therapy  Goal: Occupational Therapy Goal  Description: Goals to be met by: 6/13/2023    Patient will increase functional independence with ADLs by performing:    UE Dressing with Stand-by Assistance.  LE Dressing with Minimal Assistance.  Grooming while seated with Supervision.  Toileting from bedside commode with Maximum Assistance for hygiene and clothing management.   Toilet transfer to bedside commode with Maximum Assistance.    Outcome: Ongoing, Progressing     OT evaluation complete and POC established.  Pt reports sliding off bed one week ago and experiencing increased weakness since then.  Pt states prior to incident was (I) with ADLs for the most part, and was utilizing a wheelchair for mobility.  Pt reports performing stand pivot transfers to get in and out of wheelchair and onto toilet, and standing during LB dressing to pull clothing up.      Pt is not at PLOF and requires significant assist with all ADLs and mobility at this time.  SNF with transition to NH is recommended upon d/c from acute care to further address deficits and help pt improve overall functional independence.     RICHARD Babin  6/6/2023

## 2023-06-06 NOTE — PROGRESS NOTES
[facility] ([address])  Palliative Medicine  Progress Note    Patient Name: Nalini Varma  MRN: [id number]  Admission Date: 6/4/2023  Hospital Length of Stay: 1 days  Code Status: Full Code   Attending Provider: Iron Ag MD  Consulting Provider: Lucho Gutierrez MD  Primary Care Physician: Yane Sahni MD  Principal Problem:Debility    Patient information was obtained from patient, past medical records and primary team.      Assessment/Plan:     Pulmonary  Chronic obstructive pulmonary disease  - noted hx  - on home O2 (2L)    Cardiac/Vascular  Chronic diastolic (congestive) heart failure  - noted hx  - echo 2/2023: The estimated ejection fraction is 60% and has Grade III left ventricular diastolic dysfunction.    Renal/  CKD (chronic kidney disease) stage 3, GFR 30-59 ml/min  - noted    Oncology  Recurrent adenocarcinoma of left lung  - per Dr. Gilmore, has completed her course of treatment  - will need follow up with oncology outpatient on discharge    Palliative Care  Advance care planning  6/6/2023:  - chart reviewed in depth prior to visit  - patient seen at bedside with palliative RN Amanda  - she is more awake, alert and conversant than yesterday and somewhat more aware too  - she immediately states she would be doing better if she was at home and not here  - we attempted to better understand her assessment of how she was doing  - she shared that when she has a fall and cannot get up she calls the fire department for their help, which is what she did this last time too  - she feels she was doing well at home and would like to go back home on discharge once again  - attempted to explore if she feels things had been getting more difficult at home and if she needs more help, to which she stated she would be open to having home health aides visit her at home. She denies needing them 24/7 and rather just here or there.   - attempted to rationalize part of her thought process regarding continuing  to live by herself inspite of the fact that she is bed/wheelchair bound and cannot get up if she falls down. She seemed to be okay with living life that way and did not seem bothered by calling the fire department if she needs help getting up and states she has done that many times before too. Expressed to her our worries with her situation at home.   - discussed if she has called and talked to her sons to see if they could be of any support. She mentioned her eldest son battling with cancer himself and her youngest son barely having enough money to support himself. She will call them and make them aware of her being in the hospital. She did not want me to call them myself.   - her main source of help seems to be a friend (Zafar)   - updated her regarding conversation with oncology who stated she has completed her chemo/radiation and just needs to follow up outpatient to which she agreed.   - Her primary goal is staying as independent as possible and is willing to do what is needed to make that happen. She is open to going for a rehab stint to work on getting stronger before returning home, but she is not open to the concept of nursing home at this moment despite being aware of the concerns with her returning home to live alone again.    - encouraged her to talk with her sons and updating them regarding how she is doing, she agreed.   - provided extensive support and listening ear  [] does not seem to be accepting of her current state and seems to be comfortable with her poor living conditions at home.   [] continue with current full management  [] HCPOA documents already on file from last visit with son Augie #1 and Romain #2  [] noted primary team plan for psych evaluation for capacity    Other  * Debility  - mainly bed/wheelchair bound  - transfers herself in/out of wheelchair  - poor living conditions per EMS report  - pt/ot recommending SNF then nursing home. Patient open to SNF then home, but hesitant to go  "nursing home.         I will follow-up with patient. Please contact us if you have any additional questions.    Subjective:     Chief Complaint:   Chief Complaint   Patient presents with    Leg Pain     Pt presents with bilateral leg pain x 16 hours. Pt denies recent trauma/injury.        HPI:   Per H&P: " The patient is a 72 y.o. female with a past medical history of Chronic obstructive pulmonary disease, Hyperlipidemia, Iron deficiency anemia, and Stage III CKD who presents for evaluation of bilateral leg pain. She reports she has been wheelchair bound for a year and states that she has had increased difficulty caring for herself over the last week. She reports she has been unable to transfer herself to her bed or to the toilet so she has been using a diaper. She called EMS today after she slid out of her wheelchair and was unable to get back into it without assistance. The patient reports she has been taking hydrocodone for the pain.  On initial workup, the patient is noted to have a low Na of 128 and appears disheveled.  The patient states she came to the hospital because she can't take care of herself anymore and would like placement for assistance."    Palliative care consulted for assistance with advanced care planning and support.       Hospital Course:  No notes on file      Medications:  Continuous Infusions:  Scheduled Meds:   ascorbic acid (vitamin C)  250 mg Oral Daily    atorvastatin  40 mg Oral Daily    cefTRIAXone (ROCEPHIN) IVPB  1 g Intravenous Q24H    cyanocobalamin  1,000 mcg Oral Daily    diltiaZEM  180 mg Oral Daily    DULoxetine  60 mg Oral Daily    ferrous sulfate  1 tablet Oral Daily    fluticasone furoate-vilanteroL  1 puff Inhalation Daily    heparin (porcine)  5,000 Units Subcutaneous Q8H    magnesium oxide  400 mg Oral BID    metoprolol succinate  50 mg Oral Daily    miconazole nitrate 2%   Topical (Top) BID    multivitamin  1 tablet Oral Daily    torsemide  20 mg Oral " Daily    vitamin D  1,000 Units Oral Daily     PRN Meds:acetaminophen, albuterol-ipratropium, HYDROcodone-acetaminophen, naloxone, ondansetron, sodium chloride 0.9%    Objective:     Vital Signs (Most Recent):  Temp: 97.9 °F (36.6 °C) (06/06/23 1205)  Pulse: 76 (06/06/23 1445)  Resp: 19 (06/06/23 1425)  BP: (!) 118/56 (06/06/23 1205)  SpO2: 100 % (06/06/23 1425) Vital Signs (24h Range):  Temp:  [32 °F (0 °C)-98.3 °F (36.8 °C)] 97.9 °F (36.6 °C)  Pulse:  [74-87] 76  Resp:  [16-20] 19  SpO2:  [97 %-100 %] 100 %  BP: (118-138)/(56-68) 118/56     Weight: 88.5 kg (195 lb 1.7 oz)  Body mass index is 31.51 kg/m².       Physical Exam  Constitutional:       General: She is not in acute distress.     Comments: Lying in bed , appears comfortable   HENT:      Head: Normocephalic and atraumatic.   Eyes:      Extraocular Movements: Extraocular movements intact.   Pulmonary:      Comments: NC in place, breathing comfortably  Skin:     General: Skin is warm.   Neurological:      Comments: Awake, alert, conversant          Review of Symptoms        Living Arrangements:  Lives alone    Psychosocial/Cultural:   See Palliative Psychosocial Note: Yes  - lives alone at home  - has a friend (Zafar) that comes and checks on her  - mainly in wheelchair/ bed bound   - doesn't do too much, enjoys staying home and watching tv  - 2 sons who live in   **Primary  to Follow**  Palliative Care  Consult: No      Advance Care Planning   Advance Directives:   Medical Power of : Yes      Decision Making:  Patient answered questions  Goals of Care: What is most important right now is to focus on remaining as independent as possible, improvement in condition. Accordingly, we have decided that the best plan to meet the patient's goals includes continuing with treatment.       Significant Labs: reviewed  CBC:   Recent Labs   Lab 06/06/23  0419   WBC 6.57   HGB 8.8*   HCT 25.6*   MCV 93        BMP:  Recent Labs    Lab 06/06/23  0419   GLU 77   *   K 3.6   CL 92*   CO2 30*   BUN 15   CREATININE 0.7   CALCIUM 7.4*   MG 1.6     LFT:  Lab Results   Component Value Date    AST 31 06/06/2023    ALKPHOS 147 (H) 06/06/2023    BILITOT 0.4 06/06/2023     Albumin:   Albumin   Date Value Ref Range Status   06/06/2023 1.4 (L) 3.5 - 5.2 g/dL Final     Protein:   Total Protein   Date Value Ref Range Status   06/06/2023 3.9 (L) 6.0 - 8.4 g/dL Final     Lactic acid:   Lab Results   Component Value Date    LACTATE 1.1 02/13/2023    LACTATE 0.7 01/17/2023       Significant Imaging: reviewed    > 50% of 35 min visit spent in chart review, face to face discussion of symptom assessment, coordination of care with other specialists, and d/c planning    16 minutes ACP time spent: emotional support, formulating and communication prognosis and goals of care, exploring burden/ benefit of various approaches of treatment.       Lucho Gutierrez MD  Palliative Medicine  [facility] ([address])

## 2023-06-06 NOTE — PLAN OF CARE
Problem: Physical Therapy  Goal: Physical Therapy Goal  Description: Goals to be met by: 7/6/23    Patient will perform the following to increase strength, improve mobility, and return to prior level of function:    1. Supine <> sit with CGA.  2. Stand pivot transfer from bed <> WC with CGA with least restrictive assistive device.  3. Wheelchair mobility x 50 ft with CGA using upper and/or lower extremities.      Outcome: Ongoing, Progressing     PT orders received. PT evaluation completed and goals/POC established. Pt tolerated evaluation well with no adverse reactions. Pt required assistance of two persons to transition from supine to and from sitting, however, was able to sit EOB for ~20 minutes with SBA- CGA and upper extremity support. PT will continue to follow and progress goals as tolerated. Recommend discharge to SNF with transition to NH.

## 2023-06-06 NOTE — ASSESSMENT & PLAN NOTE
- mainly bed/wheelchair bound  - transfers herself in/out of wheelchair  - poor living conditions per EMS report  - pt/ot recommending SNF then nursing home. Patient open to SNF then home, but hesitant to go nursing home.

## 2023-06-06 NOTE — PT/OT/SLP EVAL
Physical Therapy Evaluation    Patient Name:  Nalini Varma   MRN:  [id number]    Recommendations:     Discharge Recommendations: nursing facility, skilled (with transition to NH)   Discharge Equipment Recommendations: none   Barriers to discharge: Decreased caregiver support and functional mobility impairments and medical treatment    Assessment:     Nalini Varma is a 72 y.o. female admitted with a medical diagnosis of Debility. She has a past medical history that includes but is not limited to COPD, HLD, HTN, CHF, PTSD, CKD, MARY, MDD, GI bleed and hyponatremia. She presents with the following impairments/functional limitations: weakness, impaired endurance, impaired sensation, impaired self care skills, impaired functional mobility, gait instability, impaired balance, decreased lower extremity function, decreased upper extremity function, pain, decreased safety awareness, impaired skin, impaired joint extensibility.    PT orders received. PT evaluation completed and goals/POC established. Pt tolerated evaluation well with no adverse reactions. Pt required assistance of two persons to transition from supine to and from sitting, however, was able to sit EOB for ~20 minutes with SBA - CGA and upper extremity support. PT will continue to follow and progress goals as tolerated. Recommend discharge to SNF with transition to NH.     Rehab Prognosis: Good; patient would benefit from acute skilled PT services to address these deficits and reach maximum level of function.    Recent Surgery: * No surgery found *      Plan:     During this hospitalization, patient to be seen 4 x/week to address the identified rehab impairments via gait training, therapeutic activities, therapeutic exercises, neuromuscular re-education, wheelchair management/training and progress toward the following goals:    Plan of Care Expires:  07/06/23    Subjective     Chief Complaint: pain in many areas of the body (worse with  movement)  Patient/Family Comments/goals: Pt agreeable to therapy evaluation  Pain/Comfort:  Pain Rating 1:  (did not rate - knees, back, left shoulder (10/10 left knee with movement))    Patients cultural, spiritual, Episcopal conflicts given the current situation: no    Living Environment:  Pt lives alone in a third-floor apartment with elevator access. She has access to a tub-shower combination in the apartment. She slid onto the floor ~1 week ago and since she has been unable to transfer in and out of her wheelchair. Prior to the recent incident, she reports modified independence with transfers using stand pivot technique to and from her wheelchair and commode. She reports she was able to get in and out of the tub independently using her transfer tub bench.   Equipment used at home: wheelchair, rollator, hospital bed, bath bench.  DME owned (not currently used): none.  Upon discharge, patient will have limited assistance.    Objective:     Communicated with ANDERSON Walters prior to session.  Patient found HOB elevated with peripheral IV, PureWick, pressure relief boots, telemetry, oxygen  upon PT entry to room.    General Precautions: Standard, fall  Orthopedic Precautions:N/A   Braces: N/A  Respiratory Status: Nasal cannula, flow 3 L/min    Exams:  Cognition:   Patient is oriented to self, place, time and situation.  Pt follows approximately 100% of simple commands.    Mood: Pleasant and cooperative.   Safety Awareness: impaired  Musculoskeletal:  Posture:  rounded shoulders, forward head   LE ROM/Strength:   R ROM: WFL  L ROM: WFL  R Strength:   Hip flexion: 2/5  Knee extension: 2/5  Dorsiflexion: 2/5   L Strength:   Hip flexion: 2/5  Knee extension: 2/5  Dorsiflexion: 2/5   Neuromuscular:  Sensation: Impaired to light touch bilateral LEs.   Tone/Reflexes: No impairments identified with functional mobility. No formal testing performed.   Balance:   Static sitting: SBA  Dynamic sitting: CGA  Static standing:  NT  Dynamic standing: NT  Visual-vestibular: No impairments identified with functional mobility. No formal testing performed.  Integument:  per chart review, many areas of skin breakdown  Cardiopulmonary:  Edema: none noted    Functional Mobility:  Bed Mobility:     Scooting: total assistance and of 2 persons  Supine to Sit: maximal assistance and of 2 persons  Sit to Supine: moderate assistance and of 2 persons    AM-PAC 6 CLICK MOBILITY  Total Score:10     Treatment & Education:  Bed mobility training as described above.  Pt sat EOB for ~20 minutes with SBA - CGA for static / dynamic balance with intermittent upper extremity support.     PT educated patient re:   PT plan of care/role of PT  Use of bed rails for bed mobility  Fall and safety precautions  Use of call light (don't get up without assistance)  Pt verbalized understanding, however, needs reinforcement.     Patient left HOB elevated with all lines intact, call button in reach, bed alarm on, and RN notified.    GOALS:   Multidisciplinary Problems       Physical Therapy Goals          Problem: Physical Therapy    Goal Priority Disciplines Outcome Goal Variances Interventions   Physical Therapy Goal     PT, PT/OT Ongoing, Progressing     Description: Goals to be met by: 7/6/23    Patient will perform the following to increase strength, improve mobility, and return to prior level of function:    1. Supine <> sit with CGA.  2. Stand pivot transfer from bed <> WC with CGA with least restrictive assistive device.  3. Wheelchair mobility x 50 ft with CGA using upper and/or lower extremities.                           History:     Past Medical History:   Diagnosis Date    Anxiety     Cervical spinal stenosis     Choledocholithiasis     Chronic obstructive pulmonary disease 03/16/2016    Degenerative disc disease of cervical spine     Diverticulosis 04/24/2018    History of alcohol abuse 03/02/2021    History of gastric ulcer     History of L3 compression fracture  12/19/2018    History of lung cancer     s/p completion of XRT in 10/2020    Hyperlipidemia     Iron deficiency anemia     Osteoarthritis     Stage III CKD 2017       Past Surgical History:   Procedure Laterality Date    BREAST CYST EXCISION Right     1967    CATARACT EXTRACTION      COLONOSCOPY  2015    COLONOSCOPY N/A 6/8/2022    Procedure: COLONOSCOPY;  Surgeon: Helio Cheema MD;  Location: [facility] ENDO (2ND FLR);  Service: Endoscopy;  Laterality: N/A;  5/25-Pt requesting Dr. Cheema-approval given per Dr. Cheema-ml  Fully vaccinated, prep instr portal -ml    CORONARY ANGIOGRAPHY N/A 7/20/2018    Procedure: ANGIOGRAM, CORONARY ARTERY;  Surgeon: Willard Roberts MD;  Location: [facility] CATH LAB;  Service: Cardiovascular;  Laterality: N/A;    ENDOSCOPIC ULTRASOUND OF UPPER GASTROINTESTINAL TRACT N/A 3/24/2021    Procedure: ULTRASOUND, UPPER GI TRACT, ENDOSCOPIC;  Surgeon: Helio Cheema MD;  Location: [facility] ENDO (2ND FLR);  Service: Endoscopy;  Laterality: N/A;    ENDOSCOPIC ULTRASOUND OF UPPER GASTROINTESTINAL TRACT N/A 4/25/2022    Procedure: ULTRASOUND, UPPER GI TRACT, ENDOSCOPIC;  Surgeon: Helio Cheema MD;  Location: [facility] ENDO (2ND FLR);  Service: Endoscopy;  Laterality: N/A;  cardiac risk assessment 1 per Dr. Ortez. see t/e 3/17-SC  3/25:new instructions via portal. home with medical transport?-SC    ERCP N/A 3/24/2021    Procedure: ERCP (ENDOSCOPIC RETROGRADE CHOLANGIOPANCREATOGRAPHY);  Surgeon: Helio Cheema MD;  Location: [facility] ENDO (2ND FLR);  Service: Endoscopy;  Laterality: N/A;    ERCP N/A 4/30/2021    Procedure: ERCP (ENDOSCOPIC RETROGRADE CHOLANGIOPANCREATOGRAPHY);  Surgeon: Boo Gray MD;  Location: [facility] ENDO (2ND FLR);  Service: Endoscopy;  Laterality: N/A;    ERCP N/A 7/1/2021    Procedure: ERCP (ENDOSCOPIC RETROGRADE CHOLANGIOPANCREATOGRAPHY);  Surgeon: Helio Cheema MD;  Location: [facility] ENDO (2ND FLR);  Service: Endoscopy;  Laterality: N/A;  Ok for Taxi. Dr Cheema  rapid  covid 1130am- tb inst email    ESOPHAGOGASTRODUODENOSCOPY  2015    ESOPHAGOGASTRODUODENOSCOPY N/A 3/4/2021    Procedure: EGD (ESOPHAGOGASTRODUODENOSCOPY);  Surgeon: Yenifer Ramirez MD;  Location: [facility];  Service: Endoscopy;  Laterality: N/A;    ESOPHAGOGASTRODUODENOSCOPY N/A 3/24/2021    Procedure: EGD (ESOPHAGOGASTRODUODENOSCOPY);  Surgeon: Helio Cheema MD;  Location: [facility] ([address] [place] [address]);  Service: Endoscopy;  Laterality: N/A;    EYE SURGERY  2016    Cataracts    FRACTURE SURGERY  2014    JOINT REPLACEMENT  2007    ORIF FEMUR FRACTURE Left     TOTAL KNEE ARTHROPLASTY Left 2007    TUBAL LIGATION         Time Tracking:     PT Received On: 06/06/23  PT Start Time: 1011     PT Stop Time: 1041  PT Total Time (min): 30 min     Billable Minutes: Evaluation 20 and Therapeutic Activity 10  Overlap with OT for portions of session due to complex nature of patient, tolerance for therapeutic modalities, and safety with mobility to decrease fall risk for patient and caregiver injury requiring two skilled therapists to provide interventions.      06/06/2023

## 2023-06-06 NOTE — SUBJECTIVE & OBJECTIVE
Medications:  Continuous Infusions:  Scheduled Meds:   ascorbic acid (vitamin C)  250 mg Oral Daily    atorvastatin  40 mg Oral Daily    cefTRIAXone (ROCEPHIN) IVPB  1 g Intravenous Q24H    cyanocobalamin  1,000 mcg Oral Daily    diltiaZEM  180 mg Oral Daily    DULoxetine  60 mg Oral Daily    ferrous sulfate  1 tablet Oral Daily    fluticasone furoate-vilanteroL  1 puff Inhalation Daily    heparin (porcine)  5,000 Units Subcutaneous Q8H    magnesium oxide  400 mg Oral BID    metoprolol succinate  50 mg Oral Daily    miconazole nitrate 2%   Topical (Top) BID    multivitamin  1 tablet Oral Daily    torsemide  20 mg Oral Daily    vitamin D  1,000 Units Oral Daily     PRN Meds:acetaminophen, albuterol-ipratropium, HYDROcodone-acetaminophen, naloxone, ondansetron, sodium chloride 0.9%    Objective:     Vital Signs (Most Recent):  Temp: 97.9 °F (36.6 °C) (06/06/23 1205)  Pulse: 76 (06/06/23 1445)  Resp: 19 (06/06/23 1425)  BP: (!) 118/56 (06/06/23 1205)  SpO2: 100 % (06/06/23 1425) Vital Signs (24h Range):  Temp:  [32 °F (0 °C)-98.3 °F (36.8 °C)] 97.9 °F (36.6 °C)  Pulse:  [74-87] 76  Resp:  [16-20] 19  SpO2:  [97 %-100 %] 100 %  BP: (118-138)/(56-68) 118/56     Weight: 88.5 kg (195 lb 1.7 oz)  Body mass index is 31.51 kg/m².       Physical Exam  Constitutional:       General: She is not in acute distress.     Comments: Lying in bed , appears comfortable   HENT:      Head: Normocephalic and atraumatic.   Eyes:      Extraocular Movements: Extraocular movements intact.   Pulmonary:      Comments: NC in place, breathing comfortably  Skin:     General: Skin is warm.   Neurological:      Comments: Awake, alert, conversant          Review of Symptoms        Living Arrangements:  Lives alone    Psychosocial/Cultural:   See Palliative Psychosocial Note: Yes  - lives alone at home  - has a friend (Zafar) that comes and checks on her  - mainly in wheelchair/ bed bound   - doesn't do too much, enjoys staying home and watching tv  -  2 sons who live in   **Primary  to Follow**  Palliative Care  Consult: No      Advance Care Planning   Advance Directives:   Medical Power of : Yes      Decision Making:  Patient answered questions  Goals of Care: What is most important right now is to focus on remaining as independent as possible, improvement in condition. Accordingly, we have decided that the best plan to meet the patient's goals includes continuing with treatment.       Significant Labs: reviewed  CBC:   Recent Labs   Lab 06/06/23 0419   WBC 6.57   HGB 8.8*   HCT 25.6*   MCV 93        BMP:  Recent Labs   Lab 06/06/23 0419   GLU 77   *   K 3.6   CL 92*   CO2 30*   BUN 15   CREATININE 0.7   CALCIUM 7.4*   MG 1.6     LFT:  Lab Results   Component Value Date    AST 31 06/06/2023    ALKPHOS 147 (H) 06/06/2023    BILITOT 0.4 06/06/2023     Albumin:   Albumin   Date Value Ref Range Status   06/06/2023 1.4 (L) 3.5 - 5.2 g/dL Final     Protein:   Total Protein   Date Value Ref Range Status   06/06/2023 3.9 (L) 6.0 - 8.4 g/dL Final     Lactic acid:   Lab Results   Component Value Date    LACTATE 1.1 02/13/2023    LACTATE 0.7 01/17/2023       Significant Imaging: reviewed

## 2023-06-06 NOTE — PROGRESS NOTES
[facility] ([address] Medicine  Progress Note    Patient Name: Nalini Varma  MRN: [id number]  Patient Class: IP- Inpatient   Admission Date: 6/4/2023  Length of Stay: 1 days  Attending Physician: Iron Ag MD  Primary Care Provider: Yane Sahni MD        Subjective:     Principal Problem:Debility        HPI:  The patient is a 72 y.o. female with a past medical history of Chronic obstructive pulmonary disease, Hyperlipidemia, Iron deficiency anemia, and Stage III CKD who presents for evaluation of bilateral leg pain. She reports she has been wheelchair bound for a year and states that she has had increased difficulty caring for herself over the last week. She reports she has been unable to transfer herself to her bed or to the toilet so she has been using a diaper. She called EMS today after she slid out of her wheelchair and was unable to get back into it without assistance. The patient reports she has been taking hydrocodone for the pain.  On initial workup, the patient is noted to have a low Na of 128 and appears disheveled.  The patient states she came to the hospital because she can't take care of herself anymore and would like placement for assistance.      Overview/Hospital Course:  No notes on file    Interval History: patient telling me that she is paralyzed in her left leg over the last few weeks and has had slow decline in functional status and essentially been wheelchair bound for a few months. Says she doesn't want to go to placement but can't come up with much of a plan for how to care for herself other than some friend who she won't name will come help her. House in very bad condition with roaches and piles of used diapers.    Review of Systems   Constitutional:  Positive for activity change and fatigue. Negative for appetite change and fever.   HENT:  Negative for congestion, ear pain, rhinorrhea and sinus pressure.    Eyes:  Negative for pain and discharge.    Respiratory:  Negative for cough, chest tightness, shortness of breath and wheezing.    Cardiovascular:  Negative for chest pain and leg swelling.   Gastrointestinal:  Positive for diarrhea. Negative for abdominal distention, abdominal pain, nausea and vomiting.   Endocrine: Negative for cold intolerance and heat intolerance.   Genitourinary:  Negative for difficulty urinating, flank pain, frequency, hematuria and urgency.   Musculoskeletal:  Positive for arthralgias and myalgias. Negative for joint swelling.   Allergic/Immunologic: Negative for environmental allergies and food allergies.   Neurological:  Positive for weakness. Negative for dizziness, light-headedness and headaches.   Hematological:  Does not bruise/bleed easily.   Psychiatric/Behavioral:  Positive for decreased concentration. Negative for agitation and behavioral problems.    Objective:     Vital Signs (Most Recent):  Temp: 97.9 °F (36.6 °C) (06/06/23 1205)  Pulse: 75 (06/06/23 1205)  Resp: 16 (06/06/23 1205)  BP: (!) 118/56 (06/06/23 1205)  SpO2: 100 % (06/06/23 1205) Vital Signs (24h Range):  Temp:  [32 °F (0 °C)-98.3 °F (36.8 °C)] 97.9 °F (36.6 °C)  Pulse:  [74-82] 75  Resp:  [16-20] 16  SpO2:  [97 %-100 %] 100 %  BP: (118-138)/(56-68) 118/56     Weight: 88.5 kg (195 lb)  Body mass index is 31.47 kg/m².    Intake/Output Summary (Last 24 hours) at 6/6/2023 1318  Last data filed at 6/6/2023 0552  Gross per 24 hour   Intake --   Output 501 ml   Net -501 ml         Physical Exam  Constitutional:       Appearance: She is well-developed. She is ill-appearing.      Interventions: Nasal cannula in place.      Comments: Patient disheveled.   HENT:      Head: Normocephalic.   Eyes:      General:         Right eye: No discharge.         Left eye: No discharge.      Conjunctiva/sclera: Conjunctivae normal.   Cardiovascular:      Rate and Rhythm: Normal rate and regular rhythm.      Pulses:           Radial pulses are 2+ on the right side and 2+ on the  left side.      Heart sounds: Normal heart sounds.   Pulmonary:      Effort: Pulmonary effort is normal. Tachypnea present. No respiratory distress.      Breath sounds: Examination of the right-middle field reveals decreased breath sounds. Examination of the left-middle field reveals decreased breath sounds. Examination of the right-lower field reveals decreased breath sounds. Examination of the left-lower field reveals decreased breath sounds. Decreased breath sounds present.   Abdominal:      General: Bowel sounds are increased. There is no distension.      Palpations: Abdomen is soft.      Tenderness: There is no abdominal tenderness.   Musculoskeletal:         General: Normal range of motion.      Cervical back: Normal range of motion and neck supple.   Skin:     General: Skin is warm and dry.   Neurological:      Mental Status: She is lethargic and disoriented.      GCS: GCS eye subscore is 1. GCS verbal subscore is 4. GCS motor subscore is 5.   Psychiatric:         Mood and Affect: Mood is depressed.         Speech: Speech is delayed.         Behavior: Behavior is slowed. Behavior is cooperative.           Significant Labs: All pertinent labs within the past 24 hours have been reviewed.    Significant Imaging: I have reviewed all pertinent imaging results/findings within the past 24 hours.      Assessment/Plan:      * Debility  Patient with increasing weakness, appearance of inability to care for herself.  Reports becoming bedbound and now inability to transfer in and out of her wheelchair.    PT/OT to assess functional status and whether she truly is paralyzed in her left leg. CM for discharge planning        Somnolence  Patient unable to stay awake during assessment.  Reports taking pain medications and other medications that could cause differing levels of somnolence.  Will hold all of these medications for now to gain a true picture of her independence capabilities.      CKD (chronic kidney disease) stage  3, GFR 30-59 ml/min  Creatinine 1, at baseline    Monitor for acute decompensation      Hyponatremia  Na- 128, mildly decreased from past results.  Likely due decreased nutritional intake.  Possibly due to somnolence.    Improved with better intake              Chronic diastolic (congestive) heart failure  Patient is identified as having Diastolic (HFpEF) heart failure that is Chronic. CHF is currently controlled. Latest ECHO performed and demonstrates- Results for orders placed during the hospital encounter of 02/13/23    Echo    Interpretation Summary  · The left ventricle is normal in size with concentric remodeling and normal systolic function.  · Mild left atrial enlargement.  · The estimated ejection fraction is 60%.  · Grade III left ventricular diastolic dysfunction.  · Normal right ventricular size with normal right ventricular systolic function.  · There is mild aortic valve stenosis.  · Aortic valve area is 1.50 cm2; peak velocity is 3.06 m/s; mean gradient is 22 mmHg.  · Mild tricuspid regurgitation.  . Continue Beta Blocker and ACE/ARB and monitor clinical status closely. Monitor on telemetry. Patient is off CHF pathway.  Monitor strict Is&Os and daily weights.  Place on fluid restriction of 1.5 L.  on diet for CHF. Last BNP reviewed- and noted below   Recent Labs   Lab 06/05/23 0040   *   .  Appears near baseline.  Monitor for acute decompensation    Hyperlipidemia  Continue Lipitor      Essential hypertension  Normotensive currently    Continue cardizem, toprol, torsemide      Chronic obstructive pulmonary disease  Patient appears well compensated.    Continue Breo as interchange  Duonebs PRN        VTE Risk Mitigation (From admission, onward)         Ordered     heparin (porcine) injection 5,000 Units  Every 8 hours         06/05/23 0216     IP VTE HIGH RISK PATIENT  Once         06/05/23 0216     Place sequential compression device  Until discontinued         06/05/23 0216                 Discharge Planning   FELICIANO:      Code Status: Full Code   Is the patient medically ready for discharge?:     Reason for patient still in hospital (select all that apply): Treatment  Discharge Plan A: Home                  Iron Ag MD  Department of Hospital Medicine   [facility] ([address])

## 2023-06-06 NOTE — PLAN OF CARE
Patient is AAOx4. Patient reported pain during the night and medications were administered per orders. Purewhick and telemetry in place. No acute events to note. Patient resting comfortably at present. Bed locked in lowest position with side rails up x 2. Call light within reach of patient. Will continue purposeful rounding.

## 2023-06-06 NOTE — PT/OT/SLP EVAL
Occupational Therapy   Evaluation & Treatment    Name: Nalini Varma  MRN: [id number]  Admitting Diagnosis: Debility  Recent Surgery: * No surgery found *      Recommendations:     Discharge Recommendations: nursing facility, skilled (with transition to NH)  Discharge Equipment Recommendations:  none  Barriers to discharge:  Decreased caregiver support    Assessment:     Nalini Varma is a 72 y.o. female with a medical diagnosis of Debility.  She presents with fatigue and weakness. Performance deficits affecting function: weakness, impaired endurance, impaired self care skills, impaired sensation, impaired functional mobility, gait instability, impaired balance, decreased lower extremity function, decreased upper extremity function, decreased safety awareness, impaired joint extensibility, impaired skin. Pt agreeable to participating in therapy upon arrival to room.  Pt demonstrates strength and ROM in (B) UE needed for ADLs that is WFL; however, limited to ~90 degrees of shoulder flexion. Pt required Max A with assist of 2 to perform supine <> sit transfer.  Once seated at EOB pt able to maintain sitting balance with SBA.    Overall, pt tolerated therapy well.  Pt reports sliding off bed one week ago and experiencing increased weakness since then stating she is currently unable to transfer to her wheelchair, bed or toilet.  Pt states prior to incident she was (I) with ADLs for the most part, and was utilizing a wheelchair for mobility.  Pt reports performing stand pivot transfers to get in and out of wheelchair and onto toilet, and standing during LB dressing to pull clothing up.       Pt is not at PLOF and requires significant assist with all ADLs and mobility at this time.  Pt would benefit from skilled OT services to address problems listed above and increase independence with ADLs.  SNF with transition to NH is recommended upon d/c from acute care to further address deficits and help pt improve overall  functional independence.         Rehab Prognosis: Fair; patient would benefit from acute skilled OT services to address these deficits and reach maximum level of function.       Plan:     Patient to be seen 5 x/week to address the above listed problems via self-care/home management, therapeutic activities, therapeutic exercises, neuromuscular re-education  Plan of Care Expires: 07/06/23  Plan of Care Reviewed with: patient    Subjective     Chief Complaint: weakness  Patient/Family Comments/goals: Be able to t/f to wheelchair; resume PLOF    Occupational Profile:  Living Environment: Pt lives in 3rd floor apartment with elevator access.  Bathroom has tub/shower with TTB present.  Previous level of function: Pt reports sliding off bed one week ago and experiencing increased weakness since then.  States she is currently unable to transfer to her wheelchair, bed, or toilet.   Prior to incident:   -ADLs: Independent for most part; some difficulty putting on socks.  States she stands to pull clothing up for LB dressing.  Uses TTB for bathing. For LB dressing crosses leg over.  -Mobility:  Mod I with use of wheelchair  Roles and Routines: Mostly stays at home  Equipment Used at Home: wheelchair, rollator, hospital bed, bath bench, oxygen  Assistance upon Discharge: Does not have family or outside support available     Pain/Comfort:  Pain Rating 1:  (did not rate; but indicated significant pain in L shoulder, knees, and back)  Pain Rating Post-Intervention 1:  (pt comfortable at rest)    Patients cultural, spiritual, Amish conflicts given the current situation: no    Objective:     Communicated with: RN Mindi) prior to session.  Patient found HOB elevated with peripheral IV, PureWick, pressure relief boots, telemetry, oxygen upon OT entry to room.    General Precautions: Standard, fall  Orthopedic Precautions: N/A  Braces: N/A  Respiratory Status: Nasal cannula, flow 3 L/min    Occupational Performance:    Bed  Mobility:    Supine <> sit: Max A with assist of 2  Scooting: Total A supine towards HOB  Sit <> Supine: Mod A with assist of 2    Functional Mobility/Transfers:  Sit <> stand: Not appropriate for activity this date 2; pain in (B) LE with movement and weakness noted  Functional Mobility: To be assessed in upcoming sessions    Activities of Daily Living:  Upper Body Dressing: Min A for doffing/donning gown while supine.  Lower Body Dressing: Pt reports unable to don socks while seated at EOB at this time.      Cognitive/Visual Perceptual:  Cognitive/Psychosocial Skills:    -       Oriented to: Person, Place, Time, and Situation   -       Follows Commands/attention:Follows multistep  commands  -       Communication: clear/fluent  -       Memory: No Deficits noted  -       Safety awareness/insight to disability: impaired   -       Mood/Affect/Coping skills/emotional control: Cooperative, disappointed with current weakness     Physical Exam:  Postural examination/scapula alignment:   -       Rounded shoulders  -       Forward head  Skin integrity: Visible skin intact for most part; scar on knee noted  Edema:  None noted  Sensation: -       Intact for (B) UE; reports some tingling in feet  Motor Planning: WFL  Dominant hand: Right  Upper Extremity Range of Motion:    -       Right Upper Extremity: WFL; limited to ~90 degrees of shoulder flexion.  Difficulty touching top of head.  -       Left Upper Extremity: WFL; limited to ~90 degrees of shoulder flexion.  Difficulty touching top of head.  Upper Extremity Strength:   -       Right Upper Extremity: WFL; grossly 4/5 majority of muscle groups  -       Left Upper Extremity: WFL; grossly 4/5 majority of muscle groups   Strength: 5/5 both hands  Fine Motor Coordination: Intact  Gross motor coordination: Impairments noted  Balance:  Sitting- Independent    AMPAC 6 Click ADL:  AMPAC Total Score: 12    Treatment & Education:  *Evaluation and treatment session focused on  promoting increased endurance, strength, balance, coordination, and ROM needed for ADLs and functional transfers as part of daily routine.   -pt educated on role of OT in acute care setting and log rolling for bed mobility   -pt performed supine <> sit transfer  -pt sat EOB for ~20 minutes with SBA required to maintain balance.  -POC reviewed with pt      Patient left HOB elevated with all lines intact, call button in reach, and RN (Selena) notified    GOALS:   Multidisciplinary Problems       Occupational Therapy Goals          Problem: Occupational Therapy    Goal Priority Disciplines Outcome Interventions   Occupational Therapy Goal     OT, PT/OT Ongoing, Progressing    Description: Goals to be met by: 6/13/2023    Patient will increase functional independence with ADLs by performing:    UE Dressing with Stand-by Assistance.  LE Dressing with Minimal Assistance.  Grooming while seated with Supervision.  Toileting from bedside commode with Maximum Assistance for hygiene and clothing management.   Toilet transfer to bedside commode with Maximum Assistance.                         History:     Past Medical History:   Diagnosis Date    Anxiety     Cervical spinal stenosis     Choledocholithiasis     Chronic obstructive pulmonary disease 03/16/2016    Degenerative disc disease of cervical spine     Diverticulosis 04/24/2018    History of alcohol abuse 03/02/2021    History of gastric ulcer     History of L3 compression fracture 12/19/2018    History of lung cancer     s/p completion of XRT in 10/2020    Hyperlipidemia     Iron deficiency anemia     Osteoarthritis     Stage III CKD 2017         Past Surgical History:   Procedure Laterality Date    BREAST CYST EXCISION Right     1967    CATARACT EXTRACTION      COLONOSCOPY  2015    COLONOSCOPY N/A 6/8/2022    Procedure: COLONOSCOPY;  Surgeon: Helio Cheema MD;  Location: [address]);  Service: Endoscopy;  Laterality: N/A;  5/25-Pt requesting   Deni-approval given per Dr. Cheema-enrico  Fully vaccinated, prep instr portal -ml    CORONARY ANGIOGRAPHY N/A 7/20/2018    Procedure: ANGIOGRAM, CORONARY ARTERY;  Surgeon: Willard Roberts MD;  Location: [facility] CATH LAB;  Service: Cardiovascular;  Laterality: N/A;    ENDOSCOPIC ULTRASOUND OF UPPER GASTROINTESTINAL TRACT N/A 3/24/2021    Procedure: ULTRASOUND, UPPER GI TRACT, ENDOSCOPIC;  Surgeon: Helio Cheema MD;  Location: [facility] ENDO (2ND FLR);  Service: Endoscopy;  Laterality: N/A;    ENDOSCOPIC ULTRASOUND OF UPPER GASTROINTESTINAL TRACT N/A 4/25/2022    Procedure: ULTRASOUND, UPPER GI TRACT, ENDOSCOPIC;  Surgeon: Helio Cheema MD;  Location: [facility] (2ND FLR);  Service: Endoscopy;  Laterality: N/A;  cardiac risk assessment 1 per Dr. Ortez. see t/e 3/17-SC  3/25:new instructions via portal. home with medical transport?-SC    ERCP N/A 3/24/2021    Procedure: ERCP (ENDOSCOPIC RETROGRADE CHOLANGIOPANCREATOGRAPHY);  Surgeon: Helio Cheema MD;  Location: [facility] (2ND FLR);  Service: Endoscopy;  Laterality: N/A;    ERCP N/A 4/30/2021    Procedure: ERCP (ENDOSCOPIC RETROGRADE CHOLANGIOPANCREATOGRAPHY);  Surgeon: Boo Gray MD;  Location: [facility] (2ND FLR);  Service: Endoscopy;  Laterality: N/A;    ERCP N/A 7/1/2021    Procedure: ERCP (ENDOSCOPIC RETROGRADE CHOLANGIOPANCREATOGRAPHY);  Surgeon: Helio Cheema MD;  Location: [facility] (2ND FLR);  Service: Endoscopy;  Laterality: N/A;  Ok for Taxi. Dr Cheema  rapid covid 1130am- tb inst email    ESOPHAGOGASTRODUODENOSCOPY  2015    ESOPHAGOGASTRODUODENOSCOPY N/A 3/4/2021    Procedure: EGD (ESOPHAGOGASTRODUODENOSCOPY);  Surgeon: Yenifer Ramirez MD;  Location: [facility];  Service: Endoscopy;  Laterality: N/A;    ESOPHAGOGASTRODUODENOSCOPY N/A 3/24/2021    Procedure: EGD (ESOPHAGOGASTRODUODENOSCOPY);  Surgeon: Helio Cheema MD;  Location: [facility] ([address] [place] [address]);  Service: Endoscopy;  Laterality: N/A;    EYE SURGERY  2016    Cataracts     FRACTURE SURGERY  2014    JOINT REPLACEMENT  2007    ORIF FEMUR FRACTURE Left     TOTAL KNEE ARTHROPLASTY Left 2007    TUBAL LIGATION         Time Tracking:     OT Date of Treatment: 06/06/23  OT Start Time: 1009  OT Stop Time: 1041  OT Total Time (min): 32 min    Billable Minutes:Evaluation 22  Self Care/Home Management 10    RICHARD Babin  6/6/2023    *completed with PT

## 2023-06-06 NOTE — CONSULTS
RD consulted for pt at risk for PI. Please see Analia Grimes, Student Dietitian for full assessment.

## 2023-06-06 NOTE — TELEPHONE ENCOUNTER
Please reach out monico paredes - she also uses the portal and can be reached with an email if no answer by phone

## 2023-06-06 NOTE — TELEPHONE ENCOUNTER
Left voice message for Ms. Varma to call  to reschedule her prolia injection. She was scheduled for 06/05/2023

## 2023-06-06 NOTE — PLAN OF CARE
Recommendations  1) Continue cardiac diet and encourage PO intake as tolerated    2) Monitor nutrition-related labs    3) RD to follow up to monitor intake     Goals:   1) Patient will meet 50-75% intake of meals by RD follow up  Nutrition Goal Status: new  Communication of RD Recs: other (comment) (POC)

## 2023-06-06 NOTE — ASSESSMENT & PLAN NOTE
6/6/2023:  - chart reviewed in depth prior to visit  - patient seen at bedside with palliative RN Amanda  - she is more awake, alert and conversant than yesterday and somewhat more aware too  - she immediately states she would be doing better if she was at home and not here  - we attempted to better understand her assessment of how she was doing  - she shared that when she has a fall and cannot get up she calls the fire department for their help, which is what she did this last time too  - she feels she was doing well at home and would like to go back home on discharge once again  - attempted to explore if she feels things had been getting more difficult at home and if she needs more help, to which she stated she would be open to having home health aides visit her at home. She denies needing them 24/7 and rather just here or there.   - attempted to rationalize part of her thought process regarding continuing to live by herself inspite of the fact that she is bed/wheelchair bound and cannot get up if she falls down. She seemed to be okay with living life that way and did not seem bothered by calling the fire department if she needs help getting up and states she has done that many times before too. Expressed to her our worries with her situation at home.   - discussed if she has called and talked to her sons to see if they could be of any support. She mentioned her eldest son battling with cancer himself and her youngest son barely having enough money to support himself. She will call them and make them aware of her being in the hospital. She did not want me to call them myself.   - her main source of help seems to be a friend (Zafar)   - updated her regarding conversation with oncology who stated she has completed her chemo/radiation and just needs to follow up outpatient to which she agreed.   - Her primary goal is staying as independent as possible and is willing to do what is needed to make that happen. She is open  to going for a rehab stint to work on getting stronger before returning home, but she is not open to the concept of nursing home at this moment despite being aware of the concerns with her returning home to live alone again.    - encouraged her to talk with her sons and updating them regarding how she is doing, she agreed.   - provided extensive support and listening ear  [] does not seem to be accepting of her current state and seems to be comfortable with her poor living conditions at home.   [] continue with current full management  [] HCPOA documents already on file from last visit with son Augie #1 and Romain #2  [] noted primary team plan for psych evaluation for capacity

## 2023-06-06 NOTE — PROGRESS NOTES
"  [facility] ([address])  Adult Nutrition  Consult Note    SUMMARY     Recommendations  1) Continue cardiac diet and encourage PO intake as tolerated    2) Monitor nutrition-related labs    3) RD to follow up to monitor intake    Goals:   1) Patient will meet 50-75% intake of meals by RD follow up  Nutrition Goal Status: new  Communication of RD Recs: other (comment) (POC)    Assessment and Plan  Nutrition Problem  Inadequate oral intake    Related to (etiology):   Debility    Signs and Symptoms (as evidenced by):   Decreased appetite  Inability to self feed    Interventions:  Mineral-modified diet  Collaboration with other providers    Nutrition Diagnosis Status:   New    Reason for Assessment  Reason For Assessment: consult, other (see comments) (Risk for PI)  Diagnosis: other (see comments) (Debility)  Relevant Medical History: Chronic obstructive pulmonary disease, HTN, tobacco dependence, hyperlipidemia, chronic diastolic heart failure, hyponatremia, CKD stage 3  Interdisciplinary Rounds: did not attend  General Information Comments: Patient admitted for debility. Patient is wheelchair bound. Bananas noted on allergies. Reported poor appetite on admit and prior to admit. Patient states "doing the best she can". Denied commercial beverages. Patient is ordering meals and ate 100% of breakfast this AM. No swallowing problems at this time. Patient is missing teeth, chewing difficulties reported. No N/V/D/C; reported indegestion. Asad score: 13; moderate risk for pressure injury; altered skin integrity right medial foot, buttocks, right posterior thigh, posterior and anterior incontinence associated dermatitis. NFPE: patient is nourished but at risk for malnutrition d/t debility.  Nutrition Discharge Planning: dc cardiac diet    Nutrition Risk Screen  Nutrition Risk Screen: difficulty chewing/swallowing    Nutrition/Diet History  Spiritual, Cultural Beliefs, Confucianism Practices, Values that Affect " "Care: no  Food Allergies: other (see comments) (Bananas)  Factors Affecting Nutritional Intake: socio-economic, chewing difficulties/inability to chew food, inability to feed self    Anthropometrics  Temp: 97.9 °F (36.6 °C)  Height: 5' 5.98" (167.6 cm)  Height (inches): 65.98 in  Weight Method: Standard Scale  Weight: 88.5 kg (195 lb 1.7 oz)  Weight (lb): 195.11 lb  Ideal Body Weight (IBW), Female: 129.9 lb  % Ideal Body Weight, Female (lb): 150.2 %  BMI (Calculated): 31.5  BMI Grade: 30 - 34.9- obesity - grade I     Lab/Procedures/Meds  Pertinent Labs Reviewed: reviewed  CBC:  Recent Labs   Lab 06/06/23  0419   WBC 6.57   HGB 8.8*   HCT 25.6*        CMP:  Recent Labs   Lab 06/06/23 0419   CALCIUM 7.4*   ALBUMIN 1.4*   PROT 3.9*   *   K 3.6   CO2 30*   CL 92*   BUN 15   CREATININE 0.7   ALKPHOS 147*   ALT 15   AST 31   BILITOT 0.4       Pertinent Medications Reviewed: reviewed  Scheduled Meds:   ascorbic acid (vitamin C)  250 mg Oral Daily    atorvastatin  40 mg Oral Daily    cefTRIAXone (ROCEPHIN) IVPB  1 g Intravenous Q24H    cyanocobalamin  1,000 mcg Oral Daily    diltiaZEM  180 mg Oral Daily    DULoxetine  60 mg Oral Daily    ferrous sulfate  1 tablet Oral Daily    fluticasone furoate-vilanteroL  1 puff Inhalation Daily    heparin (porcine)  5,000 Units Subcutaneous Q8H    magnesium oxide  400 mg Oral BID    metoprolol succinate  50 mg Oral Daily    miconazole nitrate 2%   Topical (Top) BID    multivitamin  1 tablet Oral Daily    torsemide  20 mg Oral Daily    vitamin D  1,000 Units Oral Daily     Continuous Infusions:  PRN Meds:.acetaminophen, albuterol-ipratropium, HYDROcodone-acetaminophen, naloxone, ondansetron, sodium chloride 0.9%    Estimated/Assessed Needs  Weight Used For Calorie Calculations: 88.5 kg (195 lb 1.7 oz)  Energy Calorie Requirements (kcal): 0915-2432  Energy Need Method: [place]-St Jeor (1.1-1.3 kcal/kg)  Protein Requirements: 59-77g (1.0-1.3 g/kg)  Weight Used For Protein " Calculations: 59.1 kg (130 lb 2.9 oz)  Fluid Requirements (mL): 1 mL/kcal  Estimated Fluid Requirement Method:  (or per MD)  RDA Method (mL): 1547  CHO Requirement: 193g    Nutrition Prescription Ordered  Current Diet Order: Cardiac diet    Evaluation of Received Nutrient/Fluid Intake  I/O: 0/501  Energy Calories Required: not meeting needs  Protein Required: not meeting needs  Fluid Required: not meeting needs  Comments: LBM: 6/5/23  Tolerance: tolerating  % Intake of Estimated Energy Needs: 25 - 50 %  % Meal Intake: 25 - 50 %    Nutrition Risk  Level of Risk/Frequency of Follow-up: moderate     Monitor and Evaluation  Food and Nutrient Intake: energy intake, food and beverage intake  Food and Nutrient Adminstration: diet order  Physical Activity and Function: factors affecting access to physical activity, nutrition-related ADLs and IADLs  Anthropometric Measurements: weight, weight change, body mass index  Biochemical Data, Medical Tests and Procedures: lipid profile, inflammatory profile, glucose/endocrine profile, gastrointestinal profile, electrolyte and renal panel  Nutrition-Focused Physical Findings: overall appearance, skin     Nutrition Follow-Up  RD Follow-up?: Yes  Analia Grimes, student dietitian  [place] RDN

## 2023-06-06 NOTE — ASSESSMENT & PLAN NOTE
Patient with increasing weakness, appearance of inability to care for herself.  Reports becoming bedbound and now inability to transfer in and out of her wheelchair.    PT/OT to assess functional status and whether she truly is paralyzed in her left leg. CM for discharge planning

## 2023-06-06 NOTE — ASSESSMENT & PLAN NOTE
Na- 128, mildly decreased from past results.  Likely due decreased nutritional intake.  Possibly due to somnolence.    Improved with better intake

## 2023-06-06 NOTE — SUBJECTIVE & OBJECTIVE
Interval History: patient telling me that she is paralyzed in her left leg over the last few weeks and has had slow decline in functional status and essentially been wheelchair bound for a few months. Says she doesn't want to go to placement but can't come up with much of a plan for how to care for herself other than some friend who she won't name will come help her. House in very bad condition with roaches and piles of used diapers.    Review of Systems   Constitutional:  Positive for activity change and fatigue. Negative for appetite change and fever.   HENT:  Negative for congestion, ear pain, rhinorrhea and sinus pressure.    Eyes:  Negative for pain and discharge.   Respiratory:  Negative for cough, chest tightness, shortness of breath and wheezing.    Cardiovascular:  Negative for chest pain and leg swelling.   Gastrointestinal:  Positive for diarrhea. Negative for abdominal distention, abdominal pain, nausea and vomiting.   Endocrine: Negative for cold intolerance and heat intolerance.   Genitourinary:  Negative for difficulty urinating, flank pain, frequency, hematuria and urgency.   Musculoskeletal:  Positive for arthralgias and myalgias. Negative for joint swelling.   Allergic/Immunologic: Negative for environmental allergies and food allergies.   Neurological:  Positive for weakness. Negative for dizziness, light-headedness and headaches.   Hematological:  Does not bruise/bleed easily.   Psychiatric/Behavioral:  Positive for decreased concentration. Negative for agitation and behavioral problems.    Objective:     Vital Signs (Most Recent):  Temp: 97.9 °F (36.6 °C) (06/06/23 1205)  Pulse: 75 (06/06/23 1205)  Resp: 16 (06/06/23 1205)  BP: (!) 118/56 (06/06/23 1205)  SpO2: 100 % (06/06/23 1205) Vital Signs (24h Range):  Temp:  [32 °F (0 °C)-98.3 °F (36.8 °C)] 97.9 °F (36.6 °C)  Pulse:  [74-82] 75  Resp:  [16-20] 16  SpO2:  [97 %-100 %] 100 %  BP: (118-138)/(56-68) 118/56     Weight: 88.5 kg (195 lb)  Body  mass index is 31.47 kg/m².    Intake/Output Summary (Last 24 hours) at 6/6/2023 1318  Last data filed at 6/6/2023 0552  Gross per 24 hour   Intake --   Output 501 ml   Net -501 ml         Physical Exam  Constitutional:       Appearance: She is well-developed. She is ill-appearing.      Interventions: Nasal cannula in place.      Comments: Patient disheveled.   HENT:      Head: Normocephalic.   Eyes:      General:         Right eye: No discharge.         Left eye: No discharge.      Conjunctiva/sclera: Conjunctivae normal.   Cardiovascular:      Rate and Rhythm: Normal rate and regular rhythm.      Pulses:           Radial pulses are 2+ on the right side and 2+ on the left side.      Heart sounds: Normal heart sounds.   Pulmonary:      Effort: Pulmonary effort is normal. Tachypnea present. No respiratory distress.      Breath sounds: Examination of the right-middle field reveals decreased breath sounds. Examination of the left-middle field reveals decreased breath sounds. Examination of the right-lower field reveals decreased breath sounds. Examination of the left-lower field reveals decreased breath sounds. Decreased breath sounds present.   Abdominal:      General: Bowel sounds are increased. There is no distension.      Palpations: Abdomen is soft.      Tenderness: There is no abdominal tenderness.   Musculoskeletal:         General: Normal range of motion.      Cervical back: Normal range of motion and neck supple.   Skin:     General: Skin is warm and dry.   Neurological:      Mental Status: She is lethargic and disoriented.      GCS: GCS eye subscore is 1. GCS verbal subscore is 4. GCS motor subscore is 5.   Psychiatric:         Mood and Affect: Mood is depressed.         Speech: Speech is delayed.         Behavior: Behavior is slowed. Behavior is cooperative.           Significant Labs: All pertinent labs within the past 24 hours have been reviewed.    Significant Imaging: I have reviewed all pertinent imaging  results/findings within the past 24 hours.

## 2023-06-07 PROBLEM — E87.1 HYPONATREMIA: Status: RESOLVED | Noted: 2021-05-13 | Resolved: 2023-06-07

## 2023-06-07 PROBLEM — N30.00 ACUTE CYSTITIS WITHOUT HEMATURIA: Status: ACTIVE | Noted: 2023-06-07

## 2023-06-07 LAB
ALBUMIN SERPL BCP-MCNC: 1.4 G/DL (ref 3.5–5.2)
ALP SERPL-CCNC: 147 U/L (ref 55–135)
ALT SERPL W/O P-5'-P-CCNC: 16 U/L (ref 10–44)
ANION GAP SERPL CALC-SCNC: 7 MMOL/L (ref 8–16)
AST SERPL-CCNC: 31 U/L (ref 10–40)
BACTERIA UR CULT: ABNORMAL
BASOPHILS # BLD AUTO: 0.02 K/UL (ref 0–0.2)
BASOPHILS NFR BLD: 0.3 % (ref 0–1.9)
BILIRUB SERPL-MCNC: 0.3 MG/DL (ref 0.1–1)
BUN SERPL-MCNC: 13 MG/DL (ref 8–23)
CALCIUM SERPL-MCNC: 7.4 MG/DL (ref 8.7–10.5)
CHLORIDE SERPL-SCNC: 91 MMOL/L (ref 95–110)
CO2 SERPL-SCNC: 30 MMOL/L (ref 23–29)
CREAT SERPL-MCNC: 0.7 MG/DL (ref 0.5–1.4)
DIFFERENTIAL METHOD: ABNORMAL
EOSINOPHIL # BLD AUTO: 0.2 K/UL (ref 0–0.5)
EOSINOPHIL NFR BLD: 2.4 % (ref 0–8)
ERYTHROCYTE [DISTWIDTH] IN BLOOD BY AUTOMATED COUNT: 17 % (ref 11.5–14.5)
EST. GFR  (NO RACE VARIABLE): >60 ML/MIN/1.73 M^2
GLUCOSE SERPL-MCNC: 83 MG/DL (ref 70–110)
HCT VFR BLD AUTO: 25.6 % (ref 37–48.5)
HGB BLD-MCNC: 8.8 G/DL (ref 12–16)
IMM GRANULOCYTES # BLD AUTO: 0.02 K/UL (ref 0–0.04)
IMM GRANULOCYTES NFR BLD AUTO: 0.3 % (ref 0–0.5)
LYMPHOCYTES # BLD AUTO: 1.6 K/UL (ref 1–4.8)
LYMPHOCYTES NFR BLD: 24.1 % (ref 18–48)
MAGNESIUM SERPL-MCNC: 1.3 MG/DL (ref 1.6–2.6)
MCH RBC QN AUTO: 32.4 PG (ref 27–31)
MCHC RBC AUTO-ENTMCNC: 34.4 G/DL (ref 32–36)
MCV RBC AUTO: 94 FL (ref 82–98)
MONOCYTES # BLD AUTO: 0.9 K/UL (ref 0.3–1)
MONOCYTES NFR BLD: 13.4 % (ref 4–15)
NEUTROPHILS # BLD AUTO: 4 K/UL (ref 1.8–7.7)
NEUTROPHILS NFR BLD: 59.5 % (ref 38–73)
NRBC BLD-RTO: 0 /100 WBC
PHOSPHATE SERPL-MCNC: 2.2 MG/DL (ref 2.7–4.5)
PLATELET # BLD AUTO: 235 K/UL (ref 150–450)
PMV BLD AUTO: 9.5 FL (ref 9.2–12.9)
POTASSIUM SERPL-SCNC: 3.2 MMOL/L (ref 3.5–5.1)
PROT SERPL-MCNC: 3.9 G/DL (ref 6–8.4)
RBC # BLD AUTO: 2.72 M/UL (ref 4–5.4)
SODIUM SERPL-SCNC: 128 MMOL/L (ref 136–145)
WBC # BLD AUTO: 6.71 K/UL (ref 3.9–12.7)

## 2023-06-07 PROCEDURE — 99497 ADVNCD CARE PLAN 30 MIN: CPT | Mod: ,,, | Performed by: INTERNAL MEDICINE

## 2023-06-07 PROCEDURE — 99233 SBSQ HOSP IP/OBS HIGH 50: CPT | Mod: ,,, | Performed by: INTERNAL MEDICINE

## 2023-06-07 PROCEDURE — 99232 SBSQ HOSP IP/OBS MODERATE 35: CPT | Mod: ,,, | Performed by: INTERNAL MEDICINE

## 2023-06-07 PROCEDURE — 83735 ASSAY OF MAGNESIUM: CPT | Performed by: NURSE PRACTITIONER

## 2023-06-07 PROCEDURE — 99233 PR SUBSEQUENT HOSPITAL CARE,LEVL III: ICD-10-PCS | Mod: ,,, | Performed by: INTERNAL MEDICINE

## 2023-06-07 PROCEDURE — 94640 AIRWAY INHALATION TREATMENT: CPT

## 2023-06-07 PROCEDURE — 99497 PR ADVNCD CARE PLAN 30 MIN: ICD-10-PCS | Mod: ,,, | Performed by: INTERNAL MEDICINE

## 2023-06-07 PROCEDURE — 97535 SELF CARE MNGMENT TRAINING: CPT

## 2023-06-07 PROCEDURE — 84100 ASSAY OF PHOSPHORUS: CPT | Performed by: NURSE PRACTITIONER

## 2023-06-07 PROCEDURE — 94761 N-INVAS EAR/PLS OXIMETRY MLT: CPT

## 2023-06-07 PROCEDURE — 27000221 HC OXYGEN, UP TO 24 HOURS

## 2023-06-07 PROCEDURE — 25000003 PHARM REV CODE 250: Performed by: INTERNAL MEDICINE

## 2023-06-07 PROCEDURE — 80053 COMPREHEN METABOLIC PANEL: CPT | Performed by: NURSE PRACTITIONER

## 2023-06-07 PROCEDURE — 11000001 HC ACUTE MED/SURG PRIVATE ROOM

## 2023-06-07 PROCEDURE — 85025 COMPLETE CBC W/AUTO DIFF WBC: CPT | Performed by: NURSE PRACTITIONER

## 2023-06-07 PROCEDURE — 63600175 PHARM REV CODE 636 W HCPCS: Performed by: NURSE PRACTITIONER

## 2023-06-07 PROCEDURE — 25000242 PHARM REV CODE 250 ALT 637 W/ HCPCS: Performed by: NURSE PRACTITIONER

## 2023-06-07 PROCEDURE — 36415 COLL VENOUS BLD VENIPUNCTURE: CPT | Performed by: NURSE PRACTITIONER

## 2023-06-07 PROCEDURE — 99900035 HC TECH TIME PER 15 MIN (STAT)

## 2023-06-07 PROCEDURE — 63600175 PHARM REV CODE 636 W HCPCS: Performed by: INTERNAL MEDICINE

## 2023-06-07 PROCEDURE — 99232 PR SUBSEQUENT HOSPITAL CARE,LEVL II: ICD-10-PCS | Mod: ,,, | Performed by: INTERNAL MEDICINE

## 2023-06-07 PROCEDURE — 25000003 PHARM REV CODE 250: Performed by: NURSE PRACTITIONER

## 2023-06-07 PROCEDURE — 97530 THERAPEUTIC ACTIVITIES: CPT | Mod: CQ

## 2023-06-07 RX ORDER — POTASSIUM CHLORIDE 20 MEQ/1
40 TABLET, EXTENDED RELEASE ORAL ONCE
Status: COMPLETED | OUTPATIENT
Start: 2023-06-07 | End: 2023-06-07

## 2023-06-07 RX ORDER — TORSEMIDE 20 MG/1
20 TABLET ORAL DAILY
Status: DISCONTINUED | OUTPATIENT
Start: 2023-06-07 | End: 2023-06-07

## 2023-06-07 RX ORDER — TORSEMIDE 20 MG/1
20 TABLET ORAL DAILY
Status: DISCONTINUED | OUTPATIENT
Start: 2023-06-08 | End: 2023-06-15

## 2023-06-07 RX ORDER — SODIUM,POTASSIUM PHOSPHATES 280-250MG
1 POWDER IN PACKET (EA) ORAL ONCE
Status: COMPLETED | OUTPATIENT
Start: 2023-06-07 | End: 2023-06-07

## 2023-06-07 RX ADMIN — FLUTICASONE FUROATE AND VILANTEROL TRIFENATATE 1 PUFF: 100; 25 POWDER RESPIRATORY (INHALATION) at 08:06

## 2023-06-07 RX ADMIN — CEFTRIAXONE 1 G: 1 INJECTION, POWDER, FOR SOLUTION INTRAMUSCULAR; INTRAVENOUS at 01:06

## 2023-06-07 RX ADMIN — POTASSIUM & SODIUM PHOSPHATES POWDER PACK 280-160-250 MG 1 PACKET: 280-160-250 PACK at 09:06

## 2023-06-07 RX ADMIN — THERA TABS 1 TABLET: TAB at 09:06

## 2023-06-07 RX ADMIN — FERROUS SULFATE TAB 325 MG (65 MG ELEMENTAL FE) 1 EACH: 325 (65 FE) TAB at 09:06

## 2023-06-07 RX ADMIN — HYDROCODONE BITARTRATE AND ACETAMINOPHEN 1 TABLET: 5; 325 TABLET ORAL at 06:06

## 2023-06-07 RX ADMIN — CYANOCOBALAMIN TAB 1000 MCG 1000 MCG: 1000 TAB at 09:06

## 2023-06-07 RX ADMIN — Medication 250 MG: at 09:06

## 2023-06-07 RX ADMIN — Medication 400 MG: at 10:06

## 2023-06-07 RX ADMIN — MICONAZOLE NITRATE: 20 OINTMENT TOPICAL at 09:06

## 2023-06-07 RX ADMIN — ATORVASTATIN CALCIUM 40 MG: 20 TABLET, FILM COATED ORAL at 09:06

## 2023-06-07 RX ADMIN — HEPARIN SODIUM 5000 UNITS: 5000 INJECTION INTRAVENOUS; SUBCUTANEOUS at 05:06

## 2023-06-07 RX ADMIN — HEPARIN SODIUM 5000 UNITS: 5000 INJECTION INTRAVENOUS; SUBCUTANEOUS at 10:06

## 2023-06-07 RX ADMIN — HEPARIN SODIUM 5000 UNITS: 5000 INJECTION INTRAVENOUS; SUBCUTANEOUS at 01:06

## 2023-06-07 RX ADMIN — HYDROCODONE BITARTRATE AND ACETAMINOPHEN 1 TABLET: 5; 325 TABLET ORAL at 04:06

## 2023-06-07 RX ADMIN — IPRATROPIUM BROMIDE AND ALBUTEROL SULFATE 3 ML: .5; 3 SOLUTION RESPIRATORY (INHALATION) at 08:06

## 2023-06-07 RX ADMIN — Medication 400 MG: at 09:06

## 2023-06-07 RX ADMIN — POTASSIUM CHLORIDE 40 MEQ: 1500 TABLET, EXTENDED RELEASE ORAL at 09:06

## 2023-06-07 RX ADMIN — DILTIAZEM HYDROCHLORIDE 180 MG: 180 CAPSULE, COATED, EXTENDED RELEASE ORAL at 09:06

## 2023-06-07 RX ADMIN — METOPROLOL SUCCINATE 50 MG: 50 TABLET, EXTENDED RELEASE ORAL at 09:06

## 2023-06-07 RX ADMIN — Medication 1000 UNITS: at 09:06

## 2023-06-07 RX ADMIN — DULOXETINE 60 MG: 30 CAPSULE, DELAYED RELEASE ORAL at 09:06

## 2023-06-07 RX ADMIN — MICONAZOLE NITRATE: 20 OINTMENT TOPICAL at 10:06

## 2023-06-07 RX ADMIN — ONDANSETRON 4 MG: 2 INJECTION INTRAMUSCULAR; INTRAVENOUS at 09:06

## 2023-06-07 RX ADMIN — HYDROCODONE BITARTRATE AND ACETAMINOPHEN 1 TABLET: 5; 325 TABLET ORAL at 11:06

## 2023-06-07 RX ADMIN — ONDANSETRON 4 MG: 2 INJECTION INTRAMUSCULAR; INTRAVENOUS at 07:06

## 2023-06-07 NOTE — ASSESSMENT & PLAN NOTE
Patient with increasing weakness, appearance of inability to care for herself.  Reports becoming bedbound and now inability to transfer in and out of her wheelchair.    Needs SNF placement and likely would benefit from shelter NH but she is not sure she's ready to make that step. Has 1 friend she relies on to essentially do most of her care that is not simple ADLs.    Ok for discharge to SNF

## 2023-06-07 NOTE — ASSESSMENT & PLAN NOTE
Na- 128, mildly decreased from past results.  Likely due decreased nutritional intake.  Possibly due to somnolence.    Improved with better intake, monitor

## 2023-06-07 NOTE — ASSESSMENT & PLAN NOTE
6/7/23  - Chart and interval history reviewed; discussed pt in person with Dr Ag.   - Along with Amanda Escobedo (RN), met with pt at bedside; alert, sitting up in bed, readily conversational, in no distress. Reminded her of role of palliative medicine, and that I have met her during several prior hospital stays.   - Long discussion about next steps in her care; she is agreeable to SNF placement, as she wants to work on her mobility. Told her that is a good goal, and encouraged her to use SNF stay to evaluate whether she is safe to return home, or if she will need long term placement. Reminded her that while she may never be excited about NH placement, we want to ensure that she is comfortable and safe. We specifically discussed the unhygienic conditions of her house, and I asked her what her plan will be to improve this. She told me that her friend Zafar hadn't been emptying her trash as frequently as before, as his girlfriend was dealing with an illness. Told her this is a reasonable plan, though she needs to follow through with it. Offered to call Zafar or her sons; she said she had already spoken to Zafar this morning, and didn't want to bother her sons at work.   - Part of the visit was spent discussing her relationship with sons, which does seem to have some complexity   - Given likely discharge soon, brought up code status, and explained difference between full code and DNR/DNI. She was very clear that when her heart or breathing stops, she wants a peaceful and natural death. Completed electronic [place]POST specifying DNR/DNI/selective intervention, as she otherwise wants to continue with current level of care.   - Would benefit from home-based palliative care after SNF; she was agreeable to this  - Our team will continue to check in with patient during this and any future hospital stays; updated primary team and SW/CM after visit     See ACP notes on file for prior discussions with patient.

## 2023-06-07 NOTE — PROGRESS NOTES
[facility] [address] Medicine  Progress Note    Patient Name: Nalini Varma  MRN: [id number]  Patient Class: IP- Inpatient   Admission Date: 6/4/2023  Length of Stay: 2 days  Attending Physician: Iron Ag MD  Primary Care Provider: Yane Sahni MD        Subjective:     Principal Problem:Debility        HPI:  The patient is a 72 y.o. female with a past medical history of Chronic obstructive pulmonary disease, Hyperlipidemia, Iron deficiency anemia, and Stage III CKD who presents for evaluation of bilateral leg pain. She reports she has been wheelchair bound for a year and states that she has had increased difficulty caring for herself over the last week. She reports she has been unable to transfer herself to her bed or to the toilet so she has been using a diaper. She called EMS today after she slid out of her wheelchair and was unable to get back into it without assistance. The patient reports she has been taking hydrocodone for the pain.  On initial workup, the patient is noted to have a low Na of 128 and appears disheveled.  The patient states she came to the hospital because she can't take care of herself anymore and would like placement for assistance.      Overview/Hospital Course:  No notes on file    Interval History: worked with PT yesterday. Is amenable to SNF placement but still not sure about detention NH. Has no other real complaints    Review of Systems   Constitutional:  Positive for activity change and fatigue. Negative for fever.   Respiratory:  Negative for shortness of breath.    Cardiovascular:  Negative for chest pain and leg swelling.   Gastrointestinal:  Positive for diarrhea. Negative for abdominal distention, abdominal pain, nausea and vomiting.   Genitourinary:  Negative for frequency.   Musculoskeletal:  Positive for arthralgias and myalgias. Negative for joint swelling.   Neurological:  Positive for weakness. Negative for light-headedness and headaches.    Psychiatric/Behavioral:  Positive for decreased concentration. Negative for agitation and behavioral problems.    Objective:     Vital Signs (Most Recent):  Temp: 97.9 °F (36.6 °C) (06/07/23 1208)  Pulse: 80 (06/07/23 1209)  Resp: 18 (06/07/23 1208)  BP: 135/63 (06/07/23 1208)  SpO2: 99 % (06/07/23 1208) Vital Signs (24h Range):  Temp:  [97.5 °F (36.4 °C)-98.3 °F (36.8 °C)] 97.9 °F (36.6 °C)  Pulse:  [74-93] 80  Resp:  [16-20] 18  SpO2:  [97 %-100 %] 99 %  BP: (106-135)/(53-63) 135/63     Weight: 88.5 kg (195 lb 1.7 oz)  Body mass index is 31.51 kg/m².    Intake/Output Summary (Last 24 hours) at 6/7/2023 1231  Last data filed at 6/7/2023 0409  Gross per 24 hour   Intake --   Output 1300 ml   Net -1300 ml         Physical Exam  Constitutional:       Appearance: She is well-developed. She is not ill-appearing.   Eyes:      General:         Right eye: No discharge.         Left eye: No discharge.      Conjunctiva/sclera: Conjunctivae normal.   Cardiovascular:      Rate and Rhythm: Normal rate and regular rhythm.      Pulses:           Radial pulses are 2+ on the right side and 2+ on the left side.      Heart sounds: Normal heart sounds.   Pulmonary:      Effort: Pulmonary effort is normal. No respiratory distress.   Abdominal:      General: Bowel sounds are normal. There is no distension.      Palpations: Abdomen is soft.      Tenderness: There is no abdominal tenderness.   Skin:     General: Skin is warm and dry.   Psychiatric:         Mood and Affect: Mood is depressed.         Speech: Speech is delayed.         Behavior: Behavior is slowed. Behavior is cooperative.           Significant Labs: All pertinent labs within the past 24 hours have been reviewed.    Significant Imaging: I have reviewed all pertinent imaging results/findings within the past 24 hours.      Assessment/Plan:      * Debility  Patient with increasing weakness, appearance of inability to care for herself.  Reports becoming bedbound and now  inability to transfer in and out of her wheelchair.    Needs SNF placement and likely would benefit from alf NH but she is not sure she's ready to make that step. Has 1 friend she relies on to essentially do most of her care that is not simple ADLs.    Ok for discharge to SNF      Acute cystitis without hematuria  A bit soft but culture is growing GNR. She can't relate too much of a history of what's been going on but does believe that she has been having some dysuria prior to admission. Will continue on CTX and monitor culture, she has history of recurrent UTI but interestingly seems to be a different organism almost every time.       Somnolence  resolved    Xanax, gabapentin, norco, trazodone and promethazine on her home med list. Don't think this polypharmacy is helping her to improve her functional status if that is truly her goal. She has been off these meds since arrival without complaint as of yet.    Syndrome of inappropriate secretion of antidiuretic hormone  History of SIADH, monitor. May need to fluid restrict her some and encourage more solid PO intake as she has decreased her oral intake in the past few months      CKD (chronic kidney disease) stage 3, GFR 30-59 ml/min  Creatinine 1, at baseline    Monitor for acute decompensation      Chronic diastolic (congestive) heart failure  Patient is identified as having Diastolic (HFpEF) heart failure that is Chronic. CHF is currently controlled. Latest ECHO performed and demonstrates- Results for orders placed during the hospital encounter of 02/13/23    Echo    Interpretation Summary  · The left ventricle is normal in size with concentric remodeling and normal systolic function.  · Mild left atrial enlargement.  · The estimated ejection fraction is 60%.  · Grade III left ventricular diastolic dysfunction.  · Normal right ventricular size with normal right ventricular systolic function.  · There is mild aortic valve stenosis.  · Aortic valve area is 1.50  cm2; peak velocity is 3.06 m/s; mean gradient is 22 mmHg.  · Mild tricuspid regurgitation.  . Continue Beta Blocker and ACE/ARB and monitor clinical status closely. Monitor on telemetry. Patient is off CHF pathway.  Monitor strict Is&Os and daily weights.  Place on fluid restriction of 1.5 L.  on diet for CHF. Last BNP reviewed- and noted below   Recent Labs   Lab 06/05/23  0040   *   .  Appears near baseline.  Monitor for acute decompensation    Hyperlipidemia  Continue Lipitor      Essential hypertension  Normotensive currently    Continue cardizem, toprol, torsemide      Chronic obstructive pulmonary disease  Patient appears well compensated.    Continue Breo as interchange  Duonebs PRN  Stop O2 if we can on her. spO2 92-94% goal        VTE Risk Mitigation (From admission, onward)           Ordered     heparin (porcine) injection 5,000 Units  Every 8 hours         06/05/23 0216     IP VTE HIGH RISK PATIENT  Once         06/05/23 0216     Place sequential compression device  Until discontinued         06/05/23 0216                    Discharge Planning   FELICIANO:      Code Status: Full Code   Is the patient medically ready for discharge?: Yes    Reason for patient still in hospital (select all that apply): Pending disposition  Discharge Plan A: SNF                  Iron Ag MD  Department of Hospital Medicine   [facility] ([address])

## 2023-06-07 NOTE — ASSESSMENT & PLAN NOTE
- Per Dr. Gilmore, has completed her course of treatment  - Will need follow up with oncology outpatient on discharge

## 2023-06-07 NOTE — SUBJECTIVE & OBJECTIVE
Interval History: worked with PT yesterday. Is amenable to SNF placement but still not sure about long term NH. Has no other real complaints    Review of Systems   Constitutional:  Positive for activity change and fatigue. Negative for fever.   Respiratory:  Negative for shortness of breath.    Cardiovascular:  Negative for chest pain and leg swelling.   Gastrointestinal:  Positive for diarrhea. Negative for abdominal distention, abdominal pain, nausea and vomiting.   Genitourinary:  Negative for frequency.   Musculoskeletal:  Positive for arthralgias and myalgias. Negative for joint swelling.   Neurological:  Positive for weakness. Negative for light-headedness and headaches.   Psychiatric/Behavioral:  Positive for decreased concentration. Negative for agitation and behavioral problems.    Objective:     Vital Signs (Most Recent):  Temp: 97.9 °F (36.6 °C) (06/07/23 1208)  Pulse: 80 (06/07/23 1209)  Resp: 18 (06/07/23 1208)  BP: 135/63 (06/07/23 1208)  SpO2: 99 % (06/07/23 1208) Vital Signs (24h Range):  Temp:  [97.5 °F (36.4 °C)-98.3 °F (36.8 °C)] 97.9 °F (36.6 °C)  Pulse:  [74-93] 80  Resp:  [16-20] 18  SpO2:  [97 %-100 %] 99 %  BP: (106-135)/(53-63) 135/63     Weight: 88.5 kg (195 lb 1.7 oz)  Body mass index is 31.51 kg/m².    Intake/Output Summary (Last 24 hours) at 6/7/2023 1231  Last data filed at 6/7/2023 0409  Gross per 24 hour   Intake --   Output 1300 ml   Net -1300 ml         Physical Exam  Constitutional:       Appearance: She is well-developed. She is not ill-appearing.   Eyes:      General:         Right eye: No discharge.         Left eye: No discharge.      Conjunctiva/sclera: Conjunctivae normal.   Cardiovascular:      Rate and Rhythm: Normal rate and regular rhythm.      Pulses:           Radial pulses are 2+ on the right side and 2+ on the left side.      Heart sounds: Normal heart sounds.   Pulmonary:      Effort: Pulmonary effort is normal. No respiratory distress.   Abdominal:      General:  Bowel sounds are normal. There is no distension.      Palpations: Abdomen is soft.      Tenderness: There is no abdominal tenderness.   Skin:     General: Skin is warm and dry.   Psychiatric:         Mood and Affect: Mood is depressed.         Speech: Speech is delayed.         Behavior: Behavior is slowed. Behavior is cooperative.           Significant Labs: All pertinent labs within the past 24 hours have been reviewed.    Significant Imaging: I have reviewed all pertinent imaging results/findings within the past 24 hours.

## 2023-06-07 NOTE — ASSESSMENT & PLAN NOTE
- Significant underlying disease; on home oxygen   - Is DNR/DNI based on our discussion; would benefit from home-based palliative follow up.

## 2023-06-07 NOTE — ASSESSMENT & PLAN NOTE
History of SIADH, monitor. May need to fluid restrict her some and encourage more solid PO intake as she has decreased her oral intake in the past few months

## 2023-06-07 NOTE — PT/OT/SLP PROGRESS
"Occupational Therapy   Treatment    Name: Nalini Varma  MRN: [id number]  Admitting Diagnosis:  Debility       Recommendations:     Discharge Recommendations: nursing facility, skilled (With transition to NH)  Discharge Equipment Recommendations:  none  Barriers to discharge:  Decreased caregiver support (Current functional level)    Assessment:     Nalini Varma is a 72 y.o. female with a medical diagnosis of Debility.  She presents with the following performance deficits affecting function: weakness, impaired endurance, impaired self care skills, impaired functional mobility, gait instability, impaired balance, decreased upper extremity function, decreased lower extremity function, decreased safety awareness, pain, decreased ROM, impaired skin, impaired joint extensibility.     Pt with improved independence with bed mobility this date; pt also complete sit<>stand with Mod A x 2 and able to tolerate standing for 30 seconds in prep for transfers to BSC/wheelchair.     Rehab Prognosis:  Fair; patient would benefit from acute skilled OT services to address these deficits and reach maximum level of function.       Plan:     Patient to be seen 5 x/week to address the above listed problems via self-care/home management, therapeutic activities, therapeutic exercises, neuromuscular re-education  Plan of Care Expires: 07/06/23  Plan of Care Reviewed with: patient    Subjective     Chief Complaint: pain  Patient/Family Comments/goals: "I can't stand"  Pain/Comfort:  Pain Rating 1:  (Pt reporting 10/10 pain in L LE (irving knee), 9/10 R LE, 8/10 back, 7/10 B shoulders)  Pain Addressed 1: Reposition, Distraction, Cessation of Activity, Nurse notified (RN in room administering pain meds during session)  Pain Rating Post-Intervention 1:  (Pt comfortable at rest)    Objective:     Communicated with: ANDERSON Walters prior to session.  Patient found HOB elevated with bed alarm, oxygen, pressure relief boots, PureWick, peripheral IV, " telemetry upon OT entry to room.    General Precautions: Standard, fall    Orthopedic Precautions:N/A  Braces: N/A  Respiratory Status: Nasal cannula, flow 2 L/min     Occupational Performance:     Bed Mobility:    Patient completed Scooting/Bridging with total assistance  Patient completed Supine to Sit with moderate assistance; assist with L LE, trunk; VC for technique  Patient completed Sit to Supine with total assistance; increased assist needed 2/2 pain    Functional Mobility/Transfers:  Patient completed Sit <> Stand Transfer with moderate assistance and of 2 persons  with  hand-held assist, blocking B LE ; pt able to tolerate standing for ~30 seconds while RN staff applied cream to impaired skin on buttocks; max encouragement to attempt sit<>stand 2/2 pt pain and fear of falling    Activities of Daily Living:  Grooming: stand by assistance wash face sitting EOB, apply lotion to R LE  Lower Body Dressing: total assistance don/doff B socks in long sit on bed; pt attempting with max encouragement to bring R LE into figure 4 to don R sock but unable to complete task; pt declining to attempt with LLE 2/2 pain  Pt sat EOB for ~15 minutes for completion of ADL (as well as TE with PTA) with SBA for sitting balance, unilateral to bilateral UE support on bed.        [facility] 6 Click ADL: 13    Treatment & Education:  -Education on role of OT, POC, discharge recs, bed mobility technique, importance of EOB/OOB activity  -Sup>sit with Mod A, sit>sup with Total A, scoot HOB with Total A; VC for sup>sit technique, use of rails  -G/H sittting EOB with setup (wash face, apply lotion to R LE)  -Don socks with Total A, long sit on bed  -Sit<>stand from bed with Mod A x 2, HHA, blocking B LE; able to tolerate standing for ~30 seconds    Patient left HOB elevated with all lines intact, call button in reach, bed alarm on, and RN Selena present    GOALS:   Multidisciplinary Problems       Occupational Therapy Goals          Problem:  Occupational Therapy    Goal Priority Disciplines Outcome Interventions   Occupational Therapy Goal     OT, PT/OT Ongoing, Progressing    Description: Goals to be met by: 6/13/2023    Patient will increase functional independence with ADLs by performing:    UE Dressing with Stand-by Assistance.  LE Dressing with Minimal Assistance.  Grooming while seated with Supervision.  Toileting from bedside commode with Maximum Assistance for hygiene and clothing management.   Toilet transfer to bedside commode with Maximum Assistance.                         Time Tracking:     OT Date of Treatment: 06/07/23  OT Start Time: 1110  OT Stop Time: 1145  OT Total Time (min): 35 min    Billable Minutes:Self Care/Home Management 35    Overlap with PT for portions of session due to complex nature of pt and need for increased safety.  Two skilled therapists needed during functional mobility to decrease patient fall risk and decrease risk of caregiver injury.      OT/SHUKRI: OT          6/7/2023

## 2023-06-07 NOTE — ASSESSMENT & PLAN NOTE
- Mainly bed/wheelchair bound  - Transfers herself in/out of wheelchair  - Agreeable to SNF; likely would benefit from [facility] after this though so far she is resistant to this

## 2023-06-07 NOTE — PROGRESS NOTES
[facility] ([address])  Palliative Medicine  Progress Note    Patient Name: Nalini Varma  MRN: [id number]  Admission Date: 6/4/2023  Hospital Length of Stay: 2 days  Code Status: Full Code   Attending Provider: Iron Ag MD  Consulting Provider: Beata Parks MD  Primary Care Physician: Yane Sahni MD  Principal Problem:Debility    Assessment/Plan:     Advance Care Planning       6/7/23  - Chart and interval history reviewed; discussed pt in person with Dr Ag.   - Along with Amanda Escobedo (RN), met with pt at bedside; alert, sitting up in bed, readily conversational, in no distress. Reminded her of role of palliative medicine, and that I have met her during several prior hospital stays.   - Long discussion about next steps in her care; she is agreeable to SNF placement, as she wants to work on her mobility. Told her that is a good goal, and encouraged her to use SNF stay to evaluate whether she is safe to return home, or if she will need long term placement.   - Reminded her that while she may never be excited about NH placement, we want to ensure that she is comfortable and safe. We specifically discussed the unhygienic conditions of her house, and I asked her what her plan will be to improve this. She told me that her friend Zafar hadn't been emptying her trash as frequently as before, as his girlfriend was dealing with an illness. She informed me that she will make sure he does this every day or two, as well as start doing some general cleaning. Offered to call Zafar or her sons; she said she had already spoken to Zafar this morning, and didn't want to bother her sons at work.   - Part of the visit was spent discussing her relationship with sons, which does seem to have some complexity   - Given likely discharge soon, brought up code status, and explained difference between full code and DNR/DNI. She was very clear that when her heart or breathing stops, she wants a peaceful and  natural death. Completed electronic LaPOST specifying DNR/DNI/selective intervention, as she otherwise wants to continue with current level of care.   - Would benefit from home-based palliative care after SNF; she was agreeable to this  - Our team will continue to check in with patient during this and any future hospital stays; updated primary team and SW/CM after visit     See ACP notes on file for prior discussions with patient.     Other  Debility  - Mainly bed/wheelchair bound  - Transfers herself in/out of wheelchair  - Agreeable to SNF; likely would benefit from [facility] after this though so far she is resistant to this    Pulmonary  Chronic obstructive pulmonary disease  - Significant underlying disease; on home oxygen   - Is DNR/DNI based on our discussion; would benefit from home-based palliative follow up.   - Should goals become comfort-focused, would qualify for hospice based on this, protein calorie malnutrition, and overall debility     Cardiac/Vascular  Chronic diastolic (congestive) heart failure  - Noted history; EF 60% and has Grade III left ventricular diastolic dysfunction.  - Mgmt per primary team     Renal/  Acute cystitis without hematuria  - Abx per primary team     CKD (chronic kidney disease) stage 3, GFR 30-59 ml/min  - Noted underlying disease; stable     Oncology  Recurrent adenocarcinoma of left lung  - Per Dr. Gilmore, has completed her course of treatment  - Will need follow up with oncology outpatient on discharge    I will follow-up with patient. Please contact us if you have any additional questions.     GASTON Burris  Palliative Medicine Staff   [phone]    Total visit time: 51 minutes    > 50% of 35 min visit spent in chart review, face to face discussion of symptom assessment, coordination of care with other specialists, and discharge planning.    16 min ACP time spent: goals of care, emotional support, formulating and communicating prognosis, exploring burden/  benefit of various approaches of treatment.     Subjective:     Interval History: Agreeable to SNF.     Medications:  Continuous Infusions:  Scheduled Meds:   ascorbic acid (vitamin C)  250 mg Oral Daily    atorvastatin  40 mg Oral Daily    cefTRIAXone (ROCEPHIN) IVPB  1 g Intravenous Q24H    cyanocobalamin  1,000 mcg Oral Daily    diltiaZEM  180 mg Oral Daily    DULoxetine  60 mg Oral Daily    ferrous sulfate  1 tablet Oral Daily    fluticasone furoate-vilanteroL  1 puff Inhalation Daily    heparin (porcine)  5,000 Units Subcutaneous Q8H    magnesium oxide  400 mg Oral BID    metoprolol succinate  50 mg Oral Daily    miconazole nitrate 2%   Topical (Top) BID    multivitamin  1 tablet Oral Daily    [START ON 6/8/2023] torsemide  20 mg Oral Daily    vitamin D  1,000 Units Oral Daily     PRN Meds:acetaminophen, albuterol-ipratropium, HYDROcodone-acetaminophen, naloxone, ondansetron, sodium chloride 0.9%    Objective:     Vital Signs (Most Recent):  Temp: 97.9 °F (36.6 °C) (06/07/23 1208)  Pulse: 76 (06/07/23 1422)  Resp: 18 (06/07/23 1208)  BP: 135/63 (06/07/23 1208)  SpO2: 99 % (06/07/23 1208) Vital Signs (24h Range):  Temp:  [97.5 °F (36.4 °C)-98.3 °F (36.8 °C)] 97.9 °F (36.6 °C)  Pulse:  [74-93] 76  Resp:  [16-20] 18  SpO2:  [97 %-100 %] 99 %  BP: (106-135)/(53-63) 135/63     Weight: 88.5 kg (195 lb 1.7 oz)  Body mass index is 31.51 kg/m².       Physical Exam  Constitutional:       Comments: Sitting up in bed, smiled at times, able to have coherent and insightful conversation, chronically ill-appearing    HENT:      Head: Normocephalic.      Nose: Nose normal.      Mouth/Throat:      Mouth: Mucous membranes are moist.   Pulmonary:      Comments: Occasional wheezing but no conversational dyspnea   Neurological:      General: No focal deficit present.      Mental Status: She is alert and oriented to person, place, and time.   Psychiatric:         Mood and Affect: Mood normal.         Behavior: Behavior  normal.     Advance Care Planning   Advance Directives:   LaPOST: Yes    Medical Power of : Yes    Goals of Care: What is most important right now is to focus on remaining as independent as possible, improvement in condition but with limits to invasive therapies. Accordingly, we have decided that the best plan to meet the patient's goals includes continuing with treatment.       Significant Labs: All pertinent labs within the past 24 hours have been reviewed.  CBC:   Recent Labs   Lab 06/07/23  0342   WBC 6.71   HGB 8.8*   HCT 25.6*   MCV 94        BMP:  Recent Labs   Lab 06/07/23  0342   GLU 83   *   K 3.2*   CL 91*   CO2 30*   BUN 13   CREATININE 0.7   CALCIUM 7.4*   MG 1.3*     LFT:  Lab Results   Component Value Date    AST 31 06/07/2023    ALKPHOS 147 (H) 06/07/2023    BILITOT 0.3 06/07/2023     Albumin:   Albumin   Date Value Ref Range Status   06/07/2023 1.4 (L) 3.5 - 5.2 g/dL Final     Protein:   Total Protein   Date Value Ref Range Status   06/07/2023 3.9 (L) 6.0 - 8.4 g/dL Final     Lactic acid:   Lab Results   Component Value Date    LACTATE 1.1 02/13/2023    LACTATE 0.7 01/17/2023       Significant Imaging: I have reviewed all pertinent imaging results/findings within the past 24 hours.

## 2023-06-07 NOTE — ASSESSMENT & PLAN NOTE
A bit soft but culture is growing GNR. She can't relate too much of a history of what's been going on but does believe that she has been having some dysuria prior to admission. Will continue on CTX and monitor culture, she has history of recurrent UTI but interestingly seems to be a different organism almost every time.

## 2023-06-07 NOTE — PLAN OF CARE
Tressa sent referrals in [facility] to several different facilities for SNF.

## 2023-06-07 NOTE — ASSESSMENT & PLAN NOTE
- Noted history; EF 60% and has Grade III left ventricular diastolic dysfunction.  - Mgmt per primary team

## 2023-06-07 NOTE — CARE UPDATE
06/06/23 2223   Patient Assessment/Suction   Level of Consciousness (AVPU) alert   Respiratory Effort Normal;Unlabored   Expansion/Accessory Muscles/Retractions no use of accessory muscles;no retractions   All Lung Fields Breath Sounds wheezes, expiratory   Skin Integrity   $ Wound Care Tech Time 15 min   Area Observed Left;Right;Behind ear;Nares   Skin Appearance without discoloration   PRE-TX-O2   Device (Oxygen Therapy) nasal cannula   $ Is the patient on Low Flow Oxygen? Yes   Flow (L/min) 2   SpO2 98 %   Pulse Oximetry Type Intermittent   $ Pulse Oximetry - Multiple Charge Pulse Oximetry - Multiple   Pulse 75   Resp 18   Aerosol Therapy   $ Aerosol Therapy Charges Aerosol Treatment   Daily Review of Necessity (SVN) completed   Respiratory Treatment Status (SVN) given   Treatment Route (SVN) mask;oxygen   Patient Position (SVN) semi-Obrien's   Post Treatment Assessment (SVN) breath sounds improved   Signs of Intolerance (SVN) none

## 2023-06-07 NOTE — ASSESSMENT & PLAN NOTE
Patient appears well compensated.    Continue Breo as interchange  Duonebs PRN  Stop O2 if we can on her. spO2 92-94% goal

## 2023-06-07 NOTE — PLAN OF CARE
ED to Hosp-Admission (Current) from 6/4/2023 in [facility] ([address])    6/7/2023    1204   Medicare Message     Important Message from Medicare regarding Discharge Appeal Rights Given to patient/caregiver; Explained to patient/caregiver; Signed/date by patient/caregiver   Date IMM was signed 6/7/2023   Time IMM was signed 1204

## 2023-06-07 NOTE — ASSESSMENT & PLAN NOTE
resolved    Xanax, gabapentin, norco, trazodone and promethazine on her home med list. Don't think this polypharmacy is helping her to improve her functional status if that is truly her goal. She has been off these meds since arrival without complaint as of yet.

## 2023-06-07 NOTE — CARE UPDATE
06/07/23 0804   Patient Assessment/Suction   Level of Consciousness (AVPU) alert   Respiratory Effort Normal;Unlabored   All Lung Fields Breath Sounds wheezes, expiratory   Skin Integrity   $ Wound Care Tech Time 15 min   Area Observed Left;Right;Behind ear;Nares   Skin Appearance without discoloration   PRE-TX-O2   Device (Oxygen Therapy) nasal cannula   $ Is the patient on Low Flow Oxygen? Yes   Flow (L/min) 2   SpO2 98 %   Pulse Oximetry Type Intermittent   $ Pulse Oximetry - Multiple Charge Pulse Oximetry - Multiple   Pulse 80   Resp 18   Aerosol Therapy   $ Aerosol Therapy Charges Aerosol Treatment   Daily Review of Necessity (SVN) completed   Respiratory Treatment Status (SVN) given   Treatment Route (SVN) oxygen;mask   Patient Position (SVN) semi-Obrien's   Signs of Intolerance (SVN) none   Inhaler   $ Inhaler Charges MDI (Metered Dose Inahler) Treatment   Daily Review of Necessity (Inhaler) completed   Respiratory Treatment Status (Inhaler) given   Treatment Route (Inhaler) mouthpiece   Patient Position (Inhaler) HOB elevated   Post Treatment Assessment (Inhaler) breath sounds unchanged   Signs of Intolerance (Inhaler) none

## 2023-06-07 NOTE — SUBJECTIVE & OBJECTIVE
Interval History: Agreeable to SNF.     Medications:  Continuous Infusions:  Scheduled Meds:   ascorbic acid (vitamin C)  250 mg Oral Daily    atorvastatin  40 mg Oral Daily    cefTRIAXone (ROCEPHIN) IVPB  1 g Intravenous Q24H    cyanocobalamin  1,000 mcg Oral Daily    diltiaZEM  180 mg Oral Daily    DULoxetine  60 mg Oral Daily    ferrous sulfate  1 tablet Oral Daily    fluticasone furoate-vilanteroL  1 puff Inhalation Daily    heparin (porcine)  5,000 Units Subcutaneous Q8H    magnesium oxide  400 mg Oral BID    metoprolol succinate  50 mg Oral Daily    miconazole nitrate 2%   Topical (Top) BID    multivitamin  1 tablet Oral Daily    [START ON 6/8/2023] torsemide  20 mg Oral Daily    vitamin D  1,000 Units Oral Daily     PRN Meds:acetaminophen, albuterol-ipratropium, HYDROcodone-acetaminophen, naloxone, ondansetron, sodium chloride 0.9%    Objective:     Vital Signs (Most Recent):  Temp: 97.9 °F (36.6 °C) (06/07/23 1208)  Pulse: 76 (06/07/23 1422)  Resp: 18 (06/07/23 1208)  BP: 135/63 (06/07/23 1208)  SpO2: 99 % (06/07/23 1208) Vital Signs (24h Range):  Temp:  [97.5 °F (36.4 °C)-98.3 °F (36.8 °C)] 97.9 °F (36.6 °C)  Pulse:  [74-93] 76  Resp:  [16-20] 18  SpO2:  [97 %-100 %] 99 %  BP: (106-135)/(53-63) 135/63     Weight: 88.5 kg (195 lb 1.7 oz)  Body mass index is 31.51 kg/m².       Physical Exam  Constitutional:       Comments: Sitting up in bed, smiled at times, able to have coherent and insightful conversation, chronically ill-appearing    HENT:      Head: Normocephalic.      Nose: Nose normal.      Mouth/Throat:      Mouth: Mucous membranes are moist.   Pulmonary:      Comments: Occasional wheezing but no conversational dyspnea   Neurological:      General: No focal deficit present.      Mental Status: She is alert and oriented to person, place, and time.   Psychiatric:         Mood and Affect: Mood normal.         Behavior: Behavior normal.     Advance Care Planning   Advance Directives:   LaPOST: Yes     Medical Power of : Yes    Goals of Care: What is most important right now is to focus on remaining as independent as possible, improvement in condition but with limits to invasive therapies. Accordingly, we have decided that the best plan to meet the patient's goals includes continuing with treatment.       Significant Labs: All pertinent labs within the past 24 hours have been reviewed.  CBC:   Recent Labs   Lab 06/07/23  0342   WBC 6.71   HGB 8.8*   HCT 25.6*   MCV 94        BMP:  Recent Labs   Lab 06/07/23  0342   GLU 83   *   K 3.2*   CL 91*   CO2 30*   BUN 13   CREATININE 0.7   CALCIUM 7.4*   MG 1.3*     LFT:  Lab Results   Component Value Date    AST 31 06/07/2023    ALKPHOS 147 (H) 06/07/2023    BILITOT 0.3 06/07/2023     Albumin:   Albumin   Date Value Ref Range Status   06/07/2023 1.4 (L) 3.5 - 5.2 g/dL Final     Protein:   Total Protein   Date Value Ref Range Status   06/07/2023 3.9 (L) 6.0 - 8.4 g/dL Final     Lactic acid:   Lab Results   Component Value Date    LACTATE 1.1 02/13/2023    LACTATE 0.7 01/17/2023       Significant Imaging: I have reviewed all pertinent imaging results/findings within the past 24 hours.

## 2023-06-07 NOTE — PT/OT/SLP PROGRESS
Physical Therapy Treatment    Patient Name:  Nalini Varma   MRN:  [id number]    Recommendations:     Discharge Recommendations: nursing facility, skilled (with transition to NH)  Discharge Equipment Recommendations: none  Barriers to discharge: Decreased caregiver support and functional mobility impairments, medical treatment    Assessment:     Nalini Varma is a 72 y.o. female admitted with a medical diagnosis of Debility.  She presents with the following impairments/functional limitations: weakness, pain, gait instability, impaired balance, impaired endurance, impaired functional mobility, impaired joint extensibility, impaired self care skills, impaired skin, decreased lower extremity function, decreased ROM, decreased safety awareness, decreased upper extremity function.    Supine>sit with modA  Sit>stand with no AD and modA x 2 (1 trial, while RN applied cream to buttocks)  Static stand x 30 secs with no AD and modA x 2  Static sitting EOB x 15 mins with SBA  Sit>supine with totalA x 2  Scoot toward HOB with totalA x 2  Pt able to stand today, though pain is limiting factor and tolerance to activity and mobility is low  Rec SNF with transition to NH    Rehab Prognosis: Good; patient would benefit from acute skilled PT services to address these deficits and reach maximum level of function.    Recent Surgery: * No surgery found *      Plan:     During this hospitalization, patient to be seen 4 x/week to address the identified rehab impairments via gait training, therapeutic activities, therapeutic exercises, neuromuscular re-education, wheelchair management/training and progress toward the following goals:    Plan of Care Expires:  07/06/23    Subjective     Chief Complaint: pain  Patient/Family Comments/goals: pt agreeable to therapy - making jokes along the way  Pain/Comfort:  Pain Rating 1: other (see comments) (pt reporting 10/10 pain in LLE, 9/10 in RLE, 8/10 in lower back, and 7/10 in B  shoulders)  Location - Side 1: Bilateral  Location 1: leg (and lower back, shoulders)  Pain Addressed 1: Reposition, Distraction, Cessation of Activity, Nurse notified (Pt given pain meds during therapy session)  Pain Rating Post-Intervention 1: other (see comments) (no change)      Objective:     Communicated with nurse Walters prior to session.  Patient found HOB elevated with bed alarm, oxygen, pressure relief boots, PureWick, peripheral IV, telemetry upon PT entry to room.     General Precautions: Standard, fall  Orthopedic Precautions: N/A  Braces: N/A  Respiratory Status: Nasal cannula, flow 2 L/min     Functional Mobility:  Bed Mobility:     Scooting: total assistance and of 2 persons  Supine to Sit: moderate assistance  Sit to Supine: total assistance and of 2 persons  Transfers:     Sit to Stand:  moderate assistance and of 2 persons with no AD, HHA x 2      AM-PAC 6 CLICK MOBILITY  Turning over in bed (including adjusting bedclothes, sheets and blankets)?: 2  Sitting down on and standing up from a chair with arms (e.g., wheelchair, bedside commode, etc.): 2  Moving from lying on back to sitting on the side of the bed?: 2  Moving to and from a bed to a chair (including a wheelchair)?: 2  Need to walk in hospital room?: 1  Climbing 3-5 steps with a railing?: 1  Basic Mobility Total Score: 10       Treatment & Education:  Seated therex R LE: LAQ x 10  Static stand x 30 secs with no AD and modA x 2  Static sitting EOB x 15 mins with SBA    Patient left HOB elevated with all lines intact, call button in reach, bed alarm on, and nurse Selena notified..    GOALS:   Multidisciplinary Problems       Physical Therapy Goals          Problem: Physical Therapy    Goal Priority Disciplines Outcome Goal Variances Interventions   Physical Therapy Goal     PT, PT/OT Ongoing, Progressing     Description: Goals to be met by: 7/6/23    Patient will perform the following to increase strength, improve mobility, and return to prior  level of function:    1. Supine <> sit with CGA.  2. Stand pivot transfer from bed <> WC with CGA with least restrictive assistive device.  3. Wheelchair mobility x 50 ft with CGA using upper and/or lower extremities.                           Time Tracking:     PT Received On: 06/07/23  PT Start Time: 1110     PT Stop Time: 1144  PT Total Time (min): 34 min     Billable Minutes: Therapeutic Activity 34  Co-treat with OT due to complex nature of patient, to insure patient safety, and to prevent injury to patient and caregivers, requiring intervention of two skilled therapists.      Treatment Type: Treatment  PT/PTA: PTA     Number of PTA visits since last PT visit: 1 06/07/2023

## 2023-06-08 PROBLEM — F33.41 MDD (MAJOR DEPRESSIVE DISORDER), RECURRENT, IN PARTIAL REMISSION: Status: ACTIVE | Noted: 2022-06-17

## 2023-06-08 LAB
ALBUMIN SERPL BCP-MCNC: 1.6 G/DL (ref 3.5–5.2)
ALP SERPL-CCNC: 170 U/L (ref 55–135)
ALT SERPL W/O P-5'-P-CCNC: 20 U/L (ref 10–44)
ANION GAP SERPL CALC-SCNC: 7 MMOL/L (ref 8–16)
AST SERPL-CCNC: 41 U/L (ref 10–40)
BILIRUB SERPL-MCNC: 0.4 MG/DL (ref 0.1–1)
BUN SERPL-MCNC: 10 MG/DL (ref 8–23)
CALCIUM SERPL-MCNC: 7.8 MG/DL (ref 8.7–10.5)
CHLORIDE SERPL-SCNC: 92 MMOL/L (ref 95–110)
CO2 SERPL-SCNC: 30 MMOL/L (ref 23–29)
CREAT SERPL-MCNC: 0.6 MG/DL (ref 0.5–1.4)
EST. GFR  (NO RACE VARIABLE): >60 ML/MIN/1.73 M^2
GLUCOSE SERPL-MCNC: 83 MG/DL (ref 70–110)
MAGNESIUM SERPL-MCNC: 1.3 MG/DL (ref 1.6–2.6)
PHOSPHATE SERPL-MCNC: 2.9 MG/DL (ref 2.7–4.5)
POTASSIUM SERPL-SCNC: 4.3 MMOL/L (ref 3.5–5.1)
PROT SERPL-MCNC: 4.5 G/DL (ref 6–8.4)
SODIUM SERPL-SCNC: 129 MMOL/L (ref 136–145)

## 2023-06-08 PROCEDURE — 99232 PR SUBSEQUENT HOSPITAL CARE,LEVL II: ICD-10-PCS | Mod: 95,,, | Performed by: HOSPITALIST

## 2023-06-08 PROCEDURE — 99232 SBSQ HOSP IP/OBS MODERATE 35: CPT | Mod: 95,,, | Performed by: HOSPITALIST

## 2023-06-08 PROCEDURE — 25000003 PHARM REV CODE 250: Performed by: INTERNAL MEDICINE

## 2023-06-08 PROCEDURE — 94640 AIRWAY INHALATION TREATMENT: CPT

## 2023-06-08 PROCEDURE — 94761 N-INVAS EAR/PLS OXIMETRY MLT: CPT

## 2023-06-08 PROCEDURE — G0425 INPT/ED TELECONSULT30: HCPCS | Mod: GT,,, | Performed by: PSYCHIATRY & NEUROLOGY

## 2023-06-08 PROCEDURE — 84100 ASSAY OF PHOSPHORUS: CPT | Performed by: HOSPITALIST

## 2023-06-08 PROCEDURE — 25000242 PHARM REV CODE 250 ALT 637 W/ HCPCS: Performed by: NURSE PRACTITIONER

## 2023-06-08 PROCEDURE — 97530 THERAPEUTIC ACTIVITIES: CPT | Mod: CQ

## 2023-06-08 PROCEDURE — 97535 SELF CARE MNGMENT TRAINING: CPT | Mod: CO

## 2023-06-08 PROCEDURE — 99900035 HC TECH TIME PER 15 MIN (STAT)

## 2023-06-08 PROCEDURE — 11000001 HC ACUTE MED/SURG PRIVATE ROOM

## 2023-06-08 PROCEDURE — 99233 SBSQ HOSP IP/OBS HIGH 50: CPT | Mod: ,,, | Performed by: STUDENT IN AN ORGANIZED HEALTH CARE EDUCATION/TRAINING PROGRAM

## 2023-06-08 PROCEDURE — G0425 PR INPT TELEHEALTH CONSULT 30M: ICD-10-PCS | Mod: GT,,, | Performed by: PSYCHIATRY & NEUROLOGY

## 2023-06-08 PROCEDURE — 83735 ASSAY OF MAGNESIUM: CPT | Performed by: HOSPITALIST

## 2023-06-08 PROCEDURE — 63600175 PHARM REV CODE 636 W HCPCS: Performed by: NURSE PRACTITIONER

## 2023-06-08 PROCEDURE — 99497 PR ADVNCD CARE PLAN 30 MIN: ICD-10-PCS | Mod: ,,, | Performed by: STUDENT IN AN ORGANIZED HEALTH CARE EDUCATION/TRAINING PROGRAM

## 2023-06-08 PROCEDURE — 80053 COMPREHEN METABOLIC PANEL: CPT | Performed by: HOSPITALIST

## 2023-06-08 PROCEDURE — 99497 ADVNCD CARE PLAN 30 MIN: CPT | Mod: ,,, | Performed by: STUDENT IN AN ORGANIZED HEALTH CARE EDUCATION/TRAINING PROGRAM

## 2023-06-08 PROCEDURE — 36415 COLL VENOUS BLD VENIPUNCTURE: CPT | Performed by: HOSPITALIST

## 2023-06-08 PROCEDURE — 99233 PR SUBSEQUENT HOSPITAL CARE,LEVL III: ICD-10-PCS | Mod: ,,, | Performed by: STUDENT IN AN ORGANIZED HEALTH CARE EDUCATION/TRAINING PROGRAM

## 2023-06-08 PROCEDURE — 63600175 PHARM REV CODE 636 W HCPCS: Performed by: INTERNAL MEDICINE

## 2023-06-08 PROCEDURE — 27000221 HC OXYGEN, UP TO 24 HOURS

## 2023-06-08 PROCEDURE — 25000003 PHARM REV CODE 250: Performed by: NURSE PRACTITIONER

## 2023-06-08 RX ADMIN — IPRATROPIUM BROMIDE AND ALBUTEROL SULFATE 3 ML: .5; 3 SOLUTION RESPIRATORY (INHALATION) at 08:06

## 2023-06-08 RX ADMIN — HYDROCODONE BITARTRATE AND ACETAMINOPHEN 1 TABLET: 5; 325 TABLET ORAL at 02:06

## 2023-06-08 RX ADMIN — ONDANSETRON 4 MG: 2 INJECTION INTRAMUSCULAR; INTRAVENOUS at 08:06

## 2023-06-08 RX ADMIN — HYDROCODONE BITARTRATE AND ACETAMINOPHEN 1 TABLET: 5; 325 TABLET ORAL at 08:06

## 2023-06-08 RX ADMIN — HYDROCODONE BITARTRATE AND ACETAMINOPHEN 1 TABLET: 5; 325 TABLET ORAL at 09:06

## 2023-06-08 RX ADMIN — Medication 400 MG: at 09:06

## 2023-06-08 RX ADMIN — HEPARIN SODIUM 5000 UNITS: 5000 INJECTION INTRAVENOUS; SUBCUTANEOUS at 01:06

## 2023-06-08 RX ADMIN — THERA TABS 1 TABLET: TAB at 08:06

## 2023-06-08 RX ADMIN — FLUTICASONE FUROATE AND VILANTEROL TRIFENATATE 1 PUFF: 100; 25 POWDER RESPIRATORY (INHALATION) at 09:06

## 2023-06-08 RX ADMIN — HYDROCODONE BITARTRATE AND ACETAMINOPHEN 1 TABLET: 5; 325 TABLET ORAL at 03:06

## 2023-06-08 RX ADMIN — Medication 250 MG: at 10:06

## 2023-06-08 RX ADMIN — DULOXETINE 60 MG: 30 CAPSULE, DELAYED RELEASE ORAL at 08:06

## 2023-06-08 RX ADMIN — MICONAZOLE NITRATE: 20 OINTMENT TOPICAL at 09:06

## 2023-06-08 RX ADMIN — Medication 1000 UNITS: at 08:06

## 2023-06-08 RX ADMIN — Medication 400 MG: at 08:06

## 2023-06-08 RX ADMIN — HEPARIN SODIUM 5000 UNITS: 5000 INJECTION INTRAVENOUS; SUBCUTANEOUS at 09:06

## 2023-06-08 RX ADMIN — HEPARIN SODIUM 5000 UNITS: 5000 INJECTION INTRAVENOUS; SUBCUTANEOUS at 06:06

## 2023-06-08 RX ADMIN — ATORVASTATIN CALCIUM 40 MG: 20 TABLET, FILM COATED ORAL at 08:06

## 2023-06-08 RX ADMIN — METOPROLOL SUCCINATE 50 MG: 50 TABLET, EXTENDED RELEASE ORAL at 08:06

## 2023-06-08 RX ADMIN — IPRATROPIUM BROMIDE AND ALBUTEROL SULFATE 3 ML: .5; 3 SOLUTION RESPIRATORY (INHALATION) at 07:06

## 2023-06-08 RX ADMIN — TORSEMIDE 20 MG: 20 TABLET ORAL at 10:06

## 2023-06-08 RX ADMIN — FERROUS SULFATE TAB 325 MG (65 MG ELEMENTAL FE) 1 EACH: 325 (65 FE) TAB at 08:06

## 2023-06-08 RX ADMIN — CYANOCOBALAMIN TAB 1000 MCG 1000 MCG: 1000 TAB at 08:06

## 2023-06-08 RX ADMIN — DILTIAZEM HYDROCHLORIDE 180 MG: 180 CAPSULE, COATED, EXTENDED RELEASE ORAL at 08:06

## 2023-06-08 RX ADMIN — CEFTRIAXONE 1 G: 1 INJECTION, POWDER, FOR SOLUTION INTRAMUSCULAR; INTRAVENOUS at 01:06

## 2023-06-08 RX ADMIN — MICONAZOLE NITRATE: 20 OINTMENT TOPICAL at 08:06

## 2023-06-08 NOTE — PROGRESS NOTES
[facility] [address] Medicine  Telemedicine Progress Note    Patient Name: Nalini Varma  MRN: [id number]  Patient Class: IP- Inpatient   Admission Date: 6/4/2023  Length of Stay: 3 days  Attending Physician: Khadar Gallardo MD  Primary Care Provider: Yane Sahni MD          Subjective:     Principal Problem:Debility        HPI:  The patient is a 72 y.o. female with a past medical history of Chronic obstructive pulmonary disease, Hyperlipidemia, Iron deficiency anemia, and Stage III CKD who presents for evaluation of bilateral leg pain. She reports she has been wheelchair bound for a year and states that she has had increased difficulty caring for herself over the last week. She reports she has been unable to transfer herself to her bed or to the toilet so she has been using a diaper. She called EMS today after she slid out of her wheelchair and was unable to get back into it without assistance. The patient reports she has been taking hydrocodone for the pain.  On initial workup, the patient is noted to have a low Na of 128 and appears disheveled.  The patient states she came to the hospital because she can't take care of herself anymore and would like placement for assistance.      Overview/Hospital Course:  No notes on file    Interval History: no complaints this morning. She is pending SNF placement. Medically ready for discharge     Review of Systems   Constitutional:  Negative for chills, fatigue and fever.   HENT: Negative.     Eyes: Negative.    Respiratory:  Negative for cough and shortness of breath.    Cardiovascular:  Negative for chest pain, palpitations and leg swelling.   Gastrointestinal:  Negative for abdominal pain and vomiting.   Genitourinary: Negative.    Skin: Negative.    Neurological:  Negative for seizures and speech difficulty.   Psychiatric/Behavioral:  Negative for agitation and confusion. The patient is not nervous/anxious.    Objective:     Vital Signs  (Most Recent):  Temp: (!) 95.6 °F (35.3 °C) (06/08/23 0723)  Pulse: 69 (06/08/23 0818)  Resp: 16 (06/08/23 0859)  BP: (!) 141/66 (06/08/23 0723)  SpO2: 96 % (06/08/23 0818) Vital Signs (24h Range):  Temp:  [95.6 °F (35.3 °C)-98.1 °F (36.7 °C)] 95.6 °F (35.3 °C)  Pulse:  [69-80] 69  Resp:  [14-20] 16  SpO2:  [96 %-99 %] 96 %  BP: (121-141)/(55-66) 141/66     Weight: 88.5 kg (195 lb 1.7 oz)  Body mass index is 31.51 kg/m².    Intake/Output Summary (Last 24 hours) at 6/8/2023 0936  Last data filed at 6/8/2023 0609  Gross per 24 hour   Intake --   Output 900 ml   Net -900 ml         Physical Exam  Vitals and nursing note reviewed.   Constitutional:       General: She is awake. She is not in acute distress.     Appearance: Normal appearance. She is well-developed and well-groomed. She is not ill-appearing, toxic-appearing or diaphoretic.   HENT:      Head: Normocephalic and atraumatic.   Eyes:      General: No scleral icterus.  Cardiovascular:      Rate and Rhythm: Normal rate.   Pulmonary:      Effort: No tachypnea or respiratory distress.   Musculoskeletal:      Right lower leg: No edema.      Left lower leg: No edema.   Skin:     Coloration: Skin is not jaundiced.   Neurological:      General: No focal deficit present.      Mental Status: She is alert and oriented to person, place, and time. Mental status is at baseline.   Psychiatric:         Attention and Perception: Attention normal.         Mood and Affect: Mood and affect normal.         Speech: Speech normal.         Behavior: Behavior normal. Behavior is cooperative.         Thought Content: Thought content normal.         Cognition and Memory: Cognition and memory normal. Cognition is not impaired. Memory is not impaired.         Judgment: Judgment normal.           Significant Labs: All pertinent labs within the past 24 hours have been reviewed.    Significant Imaging: I have reviewed all pertinent imaging results/findings within the past 24  hours.      Assessment/Plan:      * Debility  Patient with increasing weakness, appearance of inability to care for herself.  Reports becoming bedbound and now inability to transfer in and out of her wheelchair.    Needs SNF placement and likely would benefit from [facility] but she is not sure she's ready to make that step. Has 1 friend she relies on to essentially do most of her care that is not simple ADLs.    Ok for discharge to SNF    Acute cystitis without hematuria  Temp Readings from Last 3 Encounters:   06/08/23 (!) 95.6 °F (35.3 °C) (Oral)   03/13/23 98.1 °F (36.7 °C)   03/06/23 98.3 °F (36.8 °C) (Oral)     Lab Results   Component Value Date    WBC 6.71 06/07/2023     No results for input(s): LACTATE in the last 72 hours.    Antibiotics given-   Antibiotics (From admission, onward)    Start     Stop Route Frequency Ordered    06/06/23 1330  cefTRIAXone (ROCEPHIN) 1 g in dextrose 5 % in water (D5W) 5 % 100 mL IVPB (MB+)         06/09 1329 IV Every 24 hours (non-standard times) 06/06/23 1318        Cultures were taken-   Microbiology Results (last 7 days)     Procedure Component Value Units Date/Time    Urine culture [[id number]]  (Abnormal)  (Susceptibility) Collected: 06/05/23 0216    Order Status: Completed Specimen: Urine Updated: 06/07/23 1926     Urine Culture, Routine KLEBSIELLA PNEUMONIAE  >100,000 cfu/ml  No other significant isolate      Narrative:      Specimen Source->Urine          Somnolence  resolved    Xanax, gabapentin, norco, trazodone and promethazine on her home med list. Don't think this polypharmacy is helping her to improve her functional status if that is truly her goal. She has been off these meds since arrival without complaint as of yet.    Syndrome of inappropriate secretion of antidiuretic hormone  History of SIADH, monitor. May need to fluid restrict her some and encourage more solid PO intake as she has decreased her oral intake in the past few months      CKD (chronic kidney  disease) stage 3, GFR 30-59 ml/min  Creatinine 1, at baseline    Monitor for acute decompensation      Chronic diastolic (congestive) heart failure  Patient is identified as having Diastolic (HFpEF) heart failure that is Chronic. CHF is currently controlled. Latest ECHO performed and demonstrates- Results for orders placed during the hospital encounter of 02/13/23    Echo    Interpretation Summary  · The left ventricle is normal in size with concentric remodeling and normal systolic function.  · Mild left atrial enlargement.  · The estimated ejection fraction is 60%.  · Grade III left ventricular diastolic dysfunction.  · Normal right ventricular size with normal right ventricular systolic function.  · There is mild aortic valve stenosis.  · Aortic valve area is 1.50 cm2; peak velocity is 3.06 m/s; mean gradient is 22 mmHg.  · Mild tricuspid regurgitation.  . Continue Beta Blocker and ACE/ARB and monitor clinical status closely. Monitor on telemetry. Patient is off CHF pathway.  Monitor strict Is&Os and daily weights.  Place on fluid restriction of 1.5 L.  on diet for CHF. Last BNP reviewed- and noted below   Recent Labs   Lab 06/05/23 0040   *   .  Appears near baseline.  Monitor for acute decompensation    Hyperlipidemia  Continue Lipitor      Tobacco dependence        Essential hypertension  Normotensive currently    Continue cardizem, toprol, torsemide      Chronic obstructive pulmonary disease  Patient appears well compensated.    Continue Breo as interchange  Duonebs PRN  Stop O2 if we can on her. spO2 92-94% goal        VTE Risk Mitigation (From admission, onward)         Ordered     heparin (porcine) injection 5,000 Units  Every 8 hours         06/05/23 0216     IP VTE HIGH RISK PATIENT  Once         06/05/23 0216     Place sequential compression device  Until discontinued         06/05/23 0216                      I have completed this tele-visit with the assistance of a telepresenter.    The  attending portion of this evaluation, treatment, and documentation was performed per Khadar Loomis MD via Telemedicine AudioVisual using the secure [employer] software platform with 2 way audio/video. The provider was located off-site and the patient is located in the hospital. The aforementioned video software was utilized to document the relevant history and physical exam    Khadar Loomis MD  Department of Hospital Medicine   [facility] ([address])

## 2023-06-08 NOTE — PT/OT/SLP PROGRESS
Physical Therapy Treatment    Patient Name:  Nalini Varma   MRN:  [id number]    Recommendations:     Discharge Recommendations: nursing facility, skilled (with transition to NH)  Discharge Equipment Recommendations: none  Barriers to discharge: Decreased caregiver support and functional mobility impairments, medical treatment    Assessment:     Nalini Varma is a 72 y.o. female admitted with a medical diagnosis of Debility.  She presents with the following impairments/functional limitations: weakness, pain, gait instability, impaired balance, impaired endurance, impaired functional mobility, impaired self care skills, impaired joint extensibility, impaired skin, decreased lower extremity function, decreased ROM, decreased safety awareness, decreased upper extremity function.    Supine>sit with modA x 2  Static sitting EOB x 5 mins with SBA  Sit>supine with maxA x 2  Roll L and R with Margo/modA  Pt tolerating not more than 5 mins at EOB 2/2 pain and distress with breathing (RT had just provided inhaler treatment)  Rec SNF with transition to NH    Rehab Prognosis: Good; patient would benefit from acute skilled PT services to address these deficits and reach maximum level of function.    Recent Surgery: * No surgery found *      Plan:     During this hospitalization, patient to be seen 4 x/week to address the identified rehab impairments via gait training, therapeutic activities, therapeutic exercises, neuromuscular re-education, wheelchair management/training and progress toward the following goals:    Plan of Care Expires:  07/06/23    Subjective     Chief Complaint: pain with movement  Patient/Family Comments/goals: I can't do this now, I need to lie back down  Pain/Comfort:  Pain Rating 1: other (see comments) (pain not rated, but pt endorsing pain with movement in B LE, B shoulders and L back)  Location - Side 1: Bilateral  Location 1: leg (and shoulders, lower back)  Pain Addressed 1: Reposition, Distraction,  Cessation of Activity  Pain Rating Post-Intervention 1: other (see comments) (not rated. More comfortable at rest)      Objective:     Communicated with nurse Stern prior to session.  Patient found HOB elevated with bed alarm, oxygen, pressure relief boots, PureWick, peripheral IV, telemetry upon PT entry to room.     General Precautions: Standard, fall  Orthopedic Precautions: N/A  Braces: N/A  Respiratory Status: Nasal cannula, flow 2 L/min     Functional Mobility:  Bed Mobility:     Rolling Left:  minimum assistance and moderate assistance  Rolling Right: minimum assistance and moderate assistance  Scooting: total assistance and of 2 persons  Supine to Sit: moderate assistance and of 2 persons  Sit to Supine: maximal assistance and of 2 persons      AM-PAC 6 CLICK MOBILITY  Turning over in bed (including adjusting bedclothes, sheets and blankets)?: 2  Sitting down on and standing up from a chair with arms (e.g., wheelchair, bedside commode, etc.): 2  Moving from lying on back to sitting on the side of the bed?: 2  Moving to and from a bed to a chair (including a wheelchair)?: 2  Need to walk in hospital room?: 1  Climbing 3-5 steps with a railing?: 1  Basic Mobility Total Score: 10       Treatment & Education:  Static sitting EOB x 5 mins with SBA  Pt endorsing pain and difficulty with breathing, requiring return to supine (elev HOB) and end of therapy session. Nurse present    Patient left HOB elevated with all lines intact, call button in reach, bed alarm on, and nurse Stern present..    GOALS:   Multidisciplinary Problems       Physical Therapy Goals          Problem: Physical Therapy    Goal Priority Disciplines Outcome Goal Variances Interventions   Physical Therapy Goal     PT, PT/OT Ongoing, Progressing     Description: Goals to be met by: 7/6/23    Patient will perform the following to increase strength, improve mobility, and return to prior level of function:    1. Supine <> sit with CGA.  2. Stand pivot  transfer from bed <> WC with CGA with least restrictive assistive device.  3. Wheelchair mobility x 50 ft with CGA using upper and/or lower extremities.                           Time Tracking:     PT Received On: 06/08/23  PT Start Time: 0937     PT Stop Time: 1003  PT Total Time (min): 26 min     Billable Minutes: Therapeutic Activity 26  Co-treat with OT due to complex nature of patient, to insure patient safety, and to prevent injury to patient and caregivers, requiring intervention of two skilled therapists.      Treatment Type: Treatment  PT/PTA: PTA     Number of PTA visits since last PT visit: 2     06/08/2023

## 2023-06-08 NOTE — ASSESSMENT & PLAN NOTE
Temp Readings from Last 3 Encounters:   06/08/23 (!) 95.6 °F (35.3 °C) (Oral)   03/13/23 98.1 °F (36.7 °C)   03/06/23 98.3 °F (36.8 °C) (Oral)     Lab Results   Component Value Date    WBC 6.71 06/07/2023     No results for input(s): LACTATE in the last 72 hours.    Antibiotics given-   Antibiotics (From admission, onward)    Start     Stop Route Frequency Ordered    06/06/23 1330  cefTRIAXone (ROCEPHIN) 1 g in dextrose 5 % in water (D5W) 5 % 100 mL IVPB (MB+)         06/09 1329 IV Every 24 hours (non-standard times) 06/06/23 1318        Cultures were taken-   Microbiology Results (last 7 days)     Procedure Component Value Units Date/Time    Urine culture [[id number]]  (Abnormal)  (Susceptibility) Collected: 06/05/23 0216    Order Status: Completed Specimen: Urine Updated: 06/07/23 1926     Urine Culture, Routine KLEBSIELLA PNEUMONIAE  >100,000 cfu/ml  No other significant isolate      Narrative:      Specimen Source->Urine

## 2023-06-08 NOTE — CONSULTS
[facility] Medicine  Telemedicine Consult Note       Nalini Varma has been accepted for transfer to [facility] and will be followed through telemedicine services beginning at 7 AM.      Herlinda Deleon MD  [facility] Medicine Staff

## 2023-06-08 NOTE — PROGRESS NOTES
[facility] ([address])  Palliative Medicine  Progress Note    Patient Name: Nalini Varma  MRN: [id number]  Admission Date: 6/4/2023  Hospital Length of Stay: 3 days  Code Status: DNR   Attending Provider: Khadar Gallardo MD  Consulting Provider: Lucho Gutierrez MD  Primary Care Physician: Yane Sahni MD  Principal Problem:Debility    Patient information was obtained from patient, past medical records and primary team.      Assessment/Plan:     Pulmonary  Chronic obstructive pulmonary disease  - Significant underlying disease; on home oxygen   - would benefit from home-based palliative follow up.     Cardiac/Vascular  Chronic diastolic (congestive) heart failure  - Noted history; EF 60% and has Grade III left ventricular diastolic dysfunction.  - Mgmt per primary team     Renal/  Acute cystitis without hematuria  - Abx per primary team     CKD (chronic kidney disease) stage 3, GFR 30-59 ml/min  - Noted underlying disease; stable     Oncology  Recurrent adenocarcinoma of left lung  - Per Dr. Gilmore, has completed her course of treatment  - Will need follow up with oncology outpatient on discharge    Palliative Care  Advance care planning  6/8/2023:  - Chart and interval history reviewed  - patient seen at bedside along with palliative RN Amanda  - she was sitting in bed comfortably  - she welcomed us to come in and talk with her  - shared that she has a lot on her mind, has been thinking about all the changed  - we talked through the changes and how difficult things have been  - she shared her desire to get back in her hospital and how hopeful she is for that  - discussed the importance of working with PT to maintain her strength. She agrees that is her hope  - she shared the difficulty of her session with PT today and how it can be a lot sometimes and she feels frustrated not being able to keep up.   - extensive discussion regarding next steps. Shared that everyone is just looking out for her and  "wanting her to do well and ensure she has the help and support she needs. she stated she is open to SNF on discharge, but wants to go home after that. Not open to NH at this time.   - she understands her friend Mars is there to help but admits he is not always reliable, but remains confident that she can manage on her own with just some more help  - states that she does not mind at all being in the wheelchair and that is nothing new for her   - she had been brought her glasses by her friend, but had lost them in the bed so we helped her find them along with her phone  - part of the visit was spent discussing the complexity of some of the relationships she has in her life   - provided extensive support and a listening ear  [] DNR per past conversations colleague Dr Parks had with her and e-LaPOST. Code status changed on epic to reflect her documented wishes.   [] continue with current full management with limits to invasive intervention  [] her goal is for maintaining strength and getting back home.     Other  * Debility  - Mainly bed/wheelchair bound  - Transfers herself in/out of wheelchair  - Agreeable to SNF, but does not want to go to nursing home        I will follow-up with patient. Please contact us if you have any additional questions.    Subjective:     Chief Complaint:   Chief Complaint   Patient presents with    Leg Pain     Pt presents with bilateral leg pain x 16 hours. Pt denies recent trauma/injury.        HPI:   Per H&P: " The patient is a 72 y.o. female with a past medical history of Chronic obstructive pulmonary disease, Hyperlipidemia, Iron deficiency anemia, and Stage III CKD who presents for evaluation of bilateral leg pain. She reports she has been wheelchair bound for a year and states that she has had increased difficulty caring for herself over the last week. She reports she has been unable to transfer herself to her bed or to the toilet so she has been using a diaper. She called EMS today " "after she slid out of her wheelchair and was unable to get back into it without assistance. The patient reports she has been taking hydrocodone for the pain.  On initial workup, the patient is noted to have a low Na of 128 and appears disheveled.  The patient states she came to the hospital because she can't take care of herself anymore and would like placement for assistance."    Palliative care consulted for assistance with advanced care planning and support.       Hospital Course:  No notes on file    Medications:  Scheduled Meds:   ascorbic acid (vitamin C)  250 mg Oral Daily    atorvastatin  40 mg Oral Daily    cyanocobalamin  1,000 mcg Oral Daily    diltiaZEM  180 mg Oral Daily    DULoxetine  60 mg Oral Daily    ferrous sulfate  1 tablet Oral Daily    fluticasone furoate-vilanteroL  1 puff Inhalation Daily    heparin (porcine)  5,000 Units Subcutaneous Q8H    magnesium oxide  400 mg Oral BID    metoprolol succinate  50 mg Oral Daily    miconazole nitrate 2%   Topical (Top) BID    multivitamin  1 tablet Oral Daily    torsemide  20 mg Oral Daily    vitamin D  1,000 Units Oral Daily     PRN Meds:acetaminophen, albuterol-ipratropium, HYDROcodone-acetaminophen, naloxone, ondansetron, sodium chloride 0.9%    Objective:     Vital Signs (Most Recent):  Temp: 97.8 °F (36.6 °C) (06/08/23 1139)  Pulse: 76 (06/08/23 1139)  Resp: 20 (06/08/23 1139)  BP: 138/65 (06/08/23 1139)  SpO2: 95 % (06/08/23 1139) Vital Signs (24h Range):  Temp:  [95.6 °F (35.3 °C)-98.1 °F (36.7 °C)] 97.8 °F (36.6 °C)  Pulse:  [69-87] 76  Resp:  [14-20] 20  SpO2:  [95 %-98 %] 95 %  BP: (121-141)/(56-66) 138/65     Weight: 88.5 kg (195 lb 1.7 oz)  Body mass index is 31.51 kg/m².       Physical Exam  Constitutional:       General: She is not in acute distress.     Comments: Lying in bed , appears comfortable   HENT:      Head: Normocephalic and atraumatic.   Eyes:      Extraocular Movements: Extraocular movements intact.   Pulmonary:      " Comments: NC in place, increased work of breathing/audible wheezing at times  Skin:     General: Skin is warm.   Neurological:      Comments: Awake, alert, conversant       Review of Symptoms        Living Arrangements:  Lives alone    Psychosocial/Cultural:   See Palliative Psychosocial Note: Yes  - lives alone at home  - has a friend (Zafar) that comes and checks on her  - mainly in wheelchair/ bed bound   - doesn't do too much, enjoys staying home and watching tv  - 2 sons who live in   **Primary  to Follow**  Palliative Care  Consult: No      Advance Care Planning   Advance Directives:   LaPOST: Yes    Do Not Resuscitate Status: Yes    Medical Power of : Yes      Decision Making:  Patient answered questions  Goals of Care: What is most important right now is to focus on remaining as independent as possible, improvement in condition but with limits to invasive therapies. Accordingly, we have decided that the best plan to meet the patient's goals includes continuing with treatment.       Significant Labs: reviewed  CBC:   Recent Labs   Lab 06/07/23  0342   WBC 6.71   HGB 8.8*   HCT 25.6*   MCV 94        BMP:  Recent Labs   Lab 06/08/23  0644   GLU 83   *   K 4.3   CL 92*   CO2 30*   BUN 10   CREATININE 0.6   CALCIUM 7.8*   MG 1.3*     LFT:  Lab Results   Component Value Date    AST 41 (H) 06/08/2023    ALKPHOS 170 (H) 06/08/2023    BILITOT 0.4 06/08/2023     Albumin:   Albumin   Date Value Ref Range Status   06/08/2023 1.6 (L) 3.5 - 5.2 g/dL Final     Protein:   Total Protein   Date Value Ref Range Status   06/08/2023 4.5 (L) 6.0 - 8.4 g/dL Final     Lactic acid:   Lab Results   Component Value Date    LACTATE 1.1 02/13/2023    LACTATE 0.7 01/17/2023       Significant Imaging: reviewed    > 50% of 35 min visit spent in chart review, face to face discussion of symptom assessment, coordination of care with other specialists, and d/c planning  16 minutes ACP time spent:   emotional support, formulating and communication prognosis and deciding on next steps, exploring burden/ benefit of various approaches of decision making.       Lucho Gutierrez MD  Palliative Medicine  [facility] ([address])

## 2023-06-08 NOTE — CARE UPDATE
06/07/23 2002   Patient Assessment/Suction   Level of Consciousness (AVPU) alert   Respiratory Effort Normal;Unlabored   Expansion/Accessory Muscles/Retractions no use of accessory muscles;no retractions;expansion symmetric   All Lung Fields Breath Sounds wheezes, expiratory   Rhythm/Pattern, Respiratory pattern regular;depth regular;unlabored   Skin Integrity   $ Wound Care Tech Time 15 min   Area Observed Left;Right;Behind ear   Skin Appearance without discoloration   PRE-TX-O2   Device (Oxygen Therapy) nasal cannula   $ Is the patient on Low Flow Oxygen? Yes   Flow (L/min) 2   Oxygen Concentration (%) 28   SpO2 98 %   Pulse Oximetry Type Intermittent   $ Pulse Oximetry - Multiple Charge Pulse Oximetry - Multiple   Pulse 71   Resp 16   Temp 97.7 °F (36.5 °C)   /61   Aerosol Therapy   $ Aerosol Therapy Charges Aerosol Treatment   Respiratory Treatment Status (SVN) given   Treatment Route (SVN) mask;oxygen   Patient Position (SVN) HOB elevated   Post Treatment Assessment (SVN) breath sounds improved   Signs of Intolerance (SVN) none   Breath Sounds Post-Respiratory Treatment   Throughout All Fields Post-Treatment All Fields   Throughout All Fields Post-Treatment aeration increased   Post-treatment Heart Rate (beats/min) 74   Post-treatment Resp Rate (breaths/min) 18   Ready to Wean/Extubation Screen   FIO2<=50 (chart decimal) 0.28

## 2023-06-08 NOTE — SUBJECTIVE & OBJECTIVE
Medications:  Scheduled Meds:   ascorbic acid (vitamin C)  250 mg Oral Daily    atorvastatin  40 mg Oral Daily    cyanocobalamin  1,000 mcg Oral Daily    diltiaZEM  180 mg Oral Daily    DULoxetine  60 mg Oral Daily    ferrous sulfate  1 tablet Oral Daily    fluticasone furoate-vilanteroL  1 puff Inhalation Daily    heparin (porcine)  5,000 Units Subcutaneous Q8H    magnesium oxide  400 mg Oral BID    metoprolol succinate  50 mg Oral Daily    miconazole nitrate 2%   Topical (Top) BID    multivitamin  1 tablet Oral Daily    torsemide  20 mg Oral Daily    vitamin D  1,000 Units Oral Daily     PRN Meds:acetaminophen, albuterol-ipratropium, HYDROcodone-acetaminophen, naloxone, ondansetron, sodium chloride 0.9%    Objective:     Vital Signs (Most Recent):  Temp: 97.8 °F (36.6 °C) (06/08/23 1139)  Pulse: 76 (06/08/23 1139)  Resp: 20 (06/08/23 1139)  BP: 138/65 (06/08/23 1139)  SpO2: 95 % (06/08/23 1139) Vital Signs (24h Range):  Temp:  [95.6 °F (35.3 °C)-98.1 °F (36.7 °C)] 97.8 °F (36.6 °C)  Pulse:  [69-87] 76  Resp:  [14-20] 20  SpO2:  [95 %-98 %] 95 %  BP: (121-141)/(56-66) 138/65     Weight: 88.5 kg (195 lb 1.7 oz)  Body mass index is 31.51 kg/m².       Physical Exam  Constitutional:       General: She is not in acute distress.     Comments: Lying in bed , appears comfortable   HENT:      Head: Normocephalic and atraumatic.   Eyes:      Extraocular Movements: Extraocular movements intact.   Pulmonary:      Comments: NC in place, increased work of breathing/audible wheezing at times  Skin:     General: Skin is warm.   Neurological:      Comments: Awake, alert, conversant       Review of Symptoms        Living Arrangements:  Lives alone    Psychosocial/Cultural:   See Palliative Psychosocial Note: Yes  - lives alone at home  - has a friend (Zafar) that comes and checks on her  - mainly in wheelchair/ bed bound   - doesn't do too much, enjoys staying home and watching tv  - 2 sons who live in   **Primary  to  Follow**  Palliative Care  Consult: No      Advance Care Planning   Advance Directives:   LaPOST: Yes    Do Not Resuscitate Status: Yes    Medical Power of : Yes      Decision Making:  Patient answered questions  Goals of Care: What is most important right now is to focus on remaining as independent as possible, improvement in condition but with limits to invasive therapies. Accordingly, we have decided that the best plan to meet the patient's goals includes continuing with treatment.       Significant Labs: reviewed  CBC:   Recent Labs   Lab 06/07/23  0342   WBC 6.71   HGB 8.8*   HCT 25.6*   MCV 94        BMP:  Recent Labs   Lab 06/08/23  0644   GLU 83   *   K 4.3   CL 92*   CO2 30*   BUN 10   CREATININE 0.6   CALCIUM 7.8*   MG 1.3*     LFT:  Lab Results   Component Value Date    AST 41 (H) 06/08/2023    ALKPHOS 170 (H) 06/08/2023    BILITOT 0.4 06/08/2023     Albumin:   Albumin   Date Value Ref Range Status   06/08/2023 1.6 (L) 3.5 - 5.2 g/dL Final     Protein:   Total Protein   Date Value Ref Range Status   06/08/2023 4.5 (L) 6.0 - 8.4 g/dL Final     Lactic acid:   Lab Results   Component Value Date    LACTATE 1.1 02/13/2023    LACTATE 0.7 01/17/2023       Significant Imaging: reviewed

## 2023-06-08 NOTE — ASSESSMENT & PLAN NOTE
- Significant underlying disease; on home oxygen   - would benefit from home-based palliative follow up.

## 2023-06-08 NOTE — ASSESSMENT & PLAN NOTE
- Mainly bed/wheelchair bound  - Transfers herself in/out of wheelchair  - Agreeable to SNF, but does not want to go to nursing home

## 2023-06-08 NOTE — PT/OT/SLP PROGRESS
Occupational Therapy   Treatment    Name: Nalini Varma  MRN: [id number]  Admitting Diagnosis:  Debility       Recommendations:     Discharge Recommendations: nursing facility, skilled (transition to NH)  Discharge Equipment Recommendations:  none  Barriers to discharge:  Decreased caregiver support (current functioal level)    Assessment:     Nalini Varma is a 72 y.o. female with a medical diagnosis of Debility.  She presents with the following deficits affecting function: weakness, impaired endurance, impaired self care skills, impaired functional mobility, impaired balance, decreased coordination, decreased upper extremity function, decreased lower extremity function, decreased safety awareness, pain, decreased ROM, gait instability.     Supine > Sit: Mod A x 2   Sit > Supine: Max A x 2   Scoot HOB: Total A x 2 with bed in trendelenburg   Sitting EOB in preparation for grooming tasks: tolerated ~5min with SBA   Rolling R and L: Min/Mod A   Grooming: Set Up and SBA to brush hair supine in bed   LBD: Total A to don/doff B socks supine in bed    Patient tolerated session well though activity tolerance was limited 2/2 pain in LUE and LLE.Rec SNF with transition to NH    Rehab Prognosis:  Fair; patient would benefit from acute skilled OT services to address these deficits and reach maximum level of function.       Plan:     Patient to be seen 5 x/week to address the above listed problems via self-care/home management, therapeutic activities, therapeutic exercises  Plan of Care Expires: 07/06/23  Plan of Care Reviewed with: patient    Subjective     Chief Complaint: Pain during transfer  Patient/Family Comments/goals: agreeable to participate in therapy   Pain/Comfort:  Pain Rating 1:  (pain  not rated, but pt endorsing pain with movement in B LE, B shoulders and L back)  Location - Side 1: Bilateral  Location - Orientation 1: generalized  Location 1:  (shoulder and leg)  Pain Addressed 1: Reposition, Distraction,  Cessation of Activity, Nurse notified  Pain Rating Post-Intervention 1:  (appears comfortable at rest)    Objective:     Communicated with: RN (Leena) prior to session.  Patient found supine with bed alarm, oxygen, peripheral IV, telemetry, pressure relief boots upon OT entry to room.    General Precautions: Standard, fall    Orthopedic Precautions:N/A  Braces: N/A  Respiratory Status: Nasal cannula, flow 2 L/min     Occupational Performance:     Bed Mobility:    Supine > Sit: Mod A x 2   Sit > Supine: Max A x 2   Scoot HOB: Total A x 2 with bed in trendelenburg   Sitting EOB in preparation for grooming tasks: tolerated ~5min with SBA   Rolling R and L: Min/Mod A       Activities of Daily Living:  Grooming: Set Up and SBA to brush hair supine in bed   LBD: Total A to don/doff B socks supine in bed        [facility] 6 Click ADL: 13    Treatment & Education:  OT role, plan of care, progression of goals, importance of continued OOB activity, ADL/functional transfer and mobility retraining, discharge recommendation, call don't fall, safety precautions, fall prevention.      Patient left supine with all lines intact, call button in reach, bed alarm on, and RN present    GOALS:   Multidisciplinary Problems       Occupational Therapy Goals          Problem: Occupational Therapy    Goal Priority Disciplines Outcome Interventions   Occupational Therapy Goal     OT, PT/OT Ongoing, Progressing    Description: Goals to be met by: 6/13/2023    Patient will increase functional independence with ADLs by performing:    UE Dressing with Stand-by Assistance.  LE Dressing with Minimal Assistance.  Grooming while seated with Supervision.  Toileting from bedside commode with Maximum Assistance for hygiene and clothing management.   Toilet transfer to bedside commode with Maximum Assistance.                         Time Tracking:     OT Date of Treatment: 06/08/23  OT Start Time: 0936  OT Stop Time: 1004  OT Total Time (min): 28 min    Billable  Minutes:Self Care/Home Management 28 min    OT/SHUKRI: SHUKRI     Number of SHUKRI visits since last OT visit: 1    Co treament performed due to patient's multiple deficits requiring two skilled therapists to safely assess patient's ability to complete ADLs and functional mobility in addition to accommodating for patient's activity tolerance while in acute care setting.     6/8/2023

## 2023-06-08 NOTE — ASSESSMENT & PLAN NOTE
6/8/2023:  - Chart and interval history reviewed  - patient seen at bedside along with palliative RN Amanda  - she was sitting in bed comfortably  - she welcomed us to come in and talk with her  - shared that she has a lot on her mind, has been thinking about all the changed  - we talked through the changes and how difficult things have been  - she shared her desire to get back in her hospital and how hopeful she is for that  - discussed the importance of working with PT to maintain her strength. She agrees that is her hope  - she shared the difficulty of her session with PT today and how it can be a lot sometimes and she feels frustrated not being able to keep up.   - extensive discussion regarding next steps. Shared that everyone is just looking out for her and wanting her to do well and ensure she has the help and support she needs. she stated she is open to SNF on discharge, but wants to go home after that. Not open to NH at this time.   - she understands her friend Mars is there to help but admits he is not always reliable, but remains confident that she can manage on her own with just some more help  - states that she does not mind at all being in the wheelchair and that is nothing new for her   - she had been brought her glasses by her friend, but had lost them in the bed so we helped her find them along with her phone  - part of the visit was spent discussing the complexity of some of the relationships she has in her life   - provided extensive support and a listening ear  [] DNR per past conversations colleague Dr Parks had with her and e-LaPOST. Code status changed on epic to reflect her documented wishes.   [] continue with current full management with limits to invasive intervention  [] her goal is for maintaining strength and getting back home.

## 2023-06-08 NOTE — SUBJECTIVE & OBJECTIVE
Interval History: no complaints this morning. She is pending SNF placement. Medically ready for discharge     Review of Systems   Constitutional:  Negative for chills, fatigue and fever.   HENT: Negative.     Eyes: Negative.    Respiratory:  Negative for cough and shortness of breath.    Cardiovascular:  Negative for chest pain, palpitations and leg swelling.   Gastrointestinal:  Negative for abdominal pain and vomiting.   Genitourinary: Negative.    Skin: Negative.    Neurological:  Negative for seizures and speech difficulty.   Psychiatric/Behavioral:  Negative for agitation and confusion. The patient is not nervous/anxious.    Objective:     Vital Signs (Most Recent):  Temp: (!) 95.6 °F (35.3 °C) (06/08/23 0723)  Pulse: 69 (06/08/23 0818)  Resp: 16 (06/08/23 0859)  BP: (!) 141/66 (06/08/23 0723)  SpO2: 96 % (06/08/23 0818) Vital Signs (24h Range):  Temp:  [95.6 °F (35.3 °C)-98.1 °F (36.7 °C)] 95.6 °F (35.3 °C)  Pulse:  [69-80] 69  Resp:  [14-20] 16  SpO2:  [96 %-99 %] 96 %  BP: (121-141)/(55-66) 141/66     Weight: 88.5 kg (195 lb 1.7 oz)  Body mass index is 31.51 kg/m².    Intake/Output Summary (Last 24 hours) at 6/8/2023 0936  Last data filed at 6/8/2023 0609  Gross per 24 hour   Intake --   Output 900 ml   Net -900 ml         Physical Exam  Vitals and nursing note reviewed.   Constitutional:       General: She is awake. She is not in acute distress.     Appearance: Normal appearance. She is well-developed and well-groomed. She is not ill-appearing, toxic-appearing or diaphoretic.   HENT:      Head: Normocephalic and atraumatic.   Eyes:      General: No scleral icterus.  Cardiovascular:      Rate and Rhythm: Normal rate.   Pulmonary:      Effort: No tachypnea or respiratory distress.   Musculoskeletal:      Right lower leg: No edema.      Left lower leg: No edema.   Skin:     Coloration: Skin is not jaundiced.   Neurological:      General: No focal deficit present.      Mental Status: She is alert and oriented  to person, place, and time. Mental status is at baseline.   Psychiatric:         Attention and Perception: Attention normal.         Mood and Affect: Mood and affect normal.         Speech: Speech normal.         Behavior: Behavior normal. Behavior is cooperative.         Thought Content: Thought content normal.         Cognition and Memory: Cognition and memory normal. Cognition is not impaired. Memory is not impaired.         Judgment: Judgment normal.           Significant Labs: All pertinent labs within the past 24 hours have been reviewed.    Significant Imaging: I have reviewed all pertinent imaging results/findings within the past 24 hours.

## 2023-06-08 NOTE — ASSESSMENT & PLAN NOTE
Patient with increasing weakness, appearance of inability to care for herself.  Reports becoming bedbound and now inability to transfer in and out of her wheelchair.    Needs SNF placement and likely would benefit from [facility] but she is not sure she's ready to make that step. Has 1 friend she relies on to essentially do most of her care that is not simple ADLs.    Ok for discharge to SNF

## 2023-06-09 PROCEDURE — 97530 THERAPEUTIC ACTIVITIES: CPT | Mod: CQ

## 2023-06-09 PROCEDURE — 94640 AIRWAY INHALATION TREATMENT: CPT

## 2023-06-09 PROCEDURE — 99900035 HC TECH TIME PER 15 MIN (STAT)

## 2023-06-09 PROCEDURE — 11000001 HC ACUTE MED/SURG PRIVATE ROOM

## 2023-06-09 PROCEDURE — 25000003 PHARM REV CODE 250: Performed by: NURSE PRACTITIONER

## 2023-06-09 PROCEDURE — 25000003 PHARM REV CODE 250: Performed by: HOSPITALIST

## 2023-06-09 PROCEDURE — 25000003 PHARM REV CODE 250: Performed by: INTERNAL MEDICINE

## 2023-06-09 PROCEDURE — 99232 SBSQ HOSP IP/OBS MODERATE 35: CPT | Mod: 95,,, | Performed by: HOSPITALIST

## 2023-06-09 PROCEDURE — 99232 PR SUBSEQUENT HOSPITAL CARE,LEVL II: ICD-10-PCS | Mod: 95,,, | Performed by: HOSPITALIST

## 2023-06-09 PROCEDURE — 63600175 PHARM REV CODE 636 W HCPCS: Performed by: NURSE PRACTITIONER

## 2023-06-09 PROCEDURE — 25000242 PHARM REV CODE 250 ALT 637 W/ HCPCS: Performed by: NURSE PRACTITIONER

## 2023-06-09 PROCEDURE — 27000221 HC OXYGEN, UP TO 24 HOURS

## 2023-06-09 RX ADMIN — HYDROCODONE BITARTRATE AND ACETAMINOPHEN 1 TABLET: 5; 325 TABLET ORAL at 10:06

## 2023-06-09 RX ADMIN — FLUTICASONE FUROATE AND VILANTEROL TRIFENATATE 1 PUFF: 100; 25 POWDER RESPIRATORY (INHALATION) at 09:06

## 2023-06-09 RX ADMIN — Medication 400 MG: at 10:06

## 2023-06-09 RX ADMIN — HEPARIN SODIUM 5000 UNITS: 5000 INJECTION INTRAVENOUS; SUBCUTANEOUS at 05:06

## 2023-06-09 RX ADMIN — DILTIAZEM HYDROCHLORIDE 180 MG: 180 CAPSULE, COATED, EXTENDED RELEASE ORAL at 09:06

## 2023-06-09 RX ADMIN — CYANOCOBALAMIN TAB 1000 MCG 1000 MCG: 1000 TAB at 09:06

## 2023-06-09 RX ADMIN — MICONAZOLE NITRATE: 20 OINTMENT TOPICAL at 09:06

## 2023-06-09 RX ADMIN — METOPROLOL SUCCINATE 50 MG: 50 TABLET, EXTENDED RELEASE ORAL at 09:06

## 2023-06-09 RX ADMIN — IPRATROPIUM BROMIDE AND ALBUTEROL SULFATE 3 ML: .5; 3 SOLUTION RESPIRATORY (INHALATION) at 09:06

## 2023-06-09 RX ADMIN — ONDANSETRON 4 MG: 2 INJECTION INTRAMUSCULAR; INTRAVENOUS at 11:06

## 2023-06-09 RX ADMIN — TORSEMIDE 20 MG: 20 TABLET ORAL at 09:06

## 2023-06-09 RX ADMIN — HEPARIN SODIUM 5000 UNITS: 5000 INJECTION INTRAVENOUS; SUBCUTANEOUS at 01:06

## 2023-06-09 RX ADMIN — HEPARIN SODIUM 5000 UNITS: 5000 INJECTION INTRAVENOUS; SUBCUTANEOUS at 10:06

## 2023-06-09 RX ADMIN — Medication 1000 UNITS: at 09:06

## 2023-06-09 RX ADMIN — ATORVASTATIN CALCIUM 40 MG: 20 TABLET, FILM COATED ORAL at 09:06

## 2023-06-09 RX ADMIN — MICONAZOLE NITRATE: 20 OINTMENT TOPICAL at 10:06

## 2023-06-09 RX ADMIN — THERA TABS 1 TABLET: TAB at 09:06

## 2023-06-09 RX ADMIN — HYDROCODONE BITARTRATE AND ACETAMINOPHEN 1 TABLET: 5; 325 TABLET ORAL at 06:06

## 2023-06-09 RX ADMIN — FERROUS SULFATE TAB 325 MG (65 MG ELEMENTAL FE) 1 EACH: 325 (65 FE) TAB at 09:06

## 2023-06-09 RX ADMIN — Medication 400 MG: at 09:06

## 2023-06-09 RX ADMIN — Medication 250 MG: at 09:06

## 2023-06-09 RX ADMIN — DULOXETINE 60 MG: 30 CAPSULE, DELAYED RELEASE ORAL at 09:06

## 2023-06-09 NOTE — CONSULTS
TELEPSYCHIATRY: INITIAL EVALUATION     ASSESSMENT AND PLAN:     DIAGNOSES & PROBLEMS:  Major Depressive Disorder Recurrent, partial remission  Generalized Anxiety Disorder  Tobacco Use Disorder  Problem Drinking    In Summary:  - Patient with history of depression and anxiety, followed by outpatient psychiatry.  Also with problem drinking though she denies history of abuse - however she is drinking above healthy limits.  She comes into hospital with functional decline, unable to care for herself.  She currently is in agreement with plan for transfer to SNF status post medical stabilization.  She is noted to be forgetful and distractible during the interview.     Plan:  - Patient in agreeent with SNF as discharge plan.  Currently being followed by palliative care.  Continue cymbalta.  Agree with discontinuing benzodiazepines.  Counseled on tobacco and alcohol cessation.     With reasonable medical certainty, based on a present-state examination complemented by information obtained via chart review:  - the patient does not currently meet the criteria for psychiatric hospitalization  - the patient can be safely and effectively managed in a less restrictive level of care  - PEC is not indicated  - continue psychotropic regimen as prescribed  - defer non-psychiatric medication(s) and management to the current provider(s) of record       PRESENTATION:     Nalini Varma is a 72 y.o. patient seen for an initial psychiatric evaluation.  A history of the presenting illness (HPI) was obtained, and a pertinent psychiatric and medical review of systems was performed.    Per Chart:  Per H&P 6/5/23:  The patient is a 72 y.o. female with a past medical history of Chronic obstructive pulmonary disease, Hyperlipidemia, Iron deficiency anemia, and Stage III CKD who presents for evaluation of bilateral leg pain. She reports she has been wheelchair bound for a year and states that she has had increased difficulty caring for  "herself over the last week. She reports she has been unable to transfer herself to her bed or to the toilet so she has been using a diaper. She called EMS today after she slid out of her wheelchair and was unable to get back into it without assistance. The patient reports she has been taking hydrocodone for the pain.  On initial workup, the patient is noted to have a low Na of 128 and appears disheveled.  The patient states she came to the hospital because she can't take care of herself anymore and would like placement for assistance.    Per Patient:  - states doing okay  - last saw outpatient psych provider a few weeks ago  - depression and anxiety a bit worse recently  - no suicidal ideation - contracts for safety    8th of September 2023 Wednesday  WORLD - WORLD     - states plan is for skilled nursing facility (rehab) as step down from hospital  - in agreement with plan      REVIEW OF SYSTEMS:  I[]I Patient unable or unwilling to provide any ROS.    [x] Y  [] N  sleep disturbance: *"sometimes*    [x] Y  [] N  appetite/weight change: **  Positive for: decreased appetite  [x] Y  [] N  fatigue/anergia: **    [x] Y  [] N  impairment in focus/concentration: **       [x] Y  [] N  depression: *"a little rough lately"*   Negative for: hopelessness  [x] Y  [] N  anxiety/worry: *worse recently*     [] Y  [x] N  somatically preoccupied: **   [] Y  [x] N  dysregulated mood/behavior: **   Negative for: moodiness, irritability  [] Y  [x] N  manic symptomatology: **     [] Y  [x] N  psychosis: **   Negative for: paranoia, hallucinations        CURRENT PSYCHOTROPIC REGIMEN:  Duloxetine 60mg daily    CURRENT PSYCHIATRIC PROVIDER:  JESE New - last visit 5/22/23      HISTORY:     I[]I Patient unable or unwilling to provide any history.    I[x]I Y  I[]I N  I[]I U  Psychiatric Diagnoses/Symptomatology: Depression/Anxiety  I[]I Y  I[x]I N  I[]I U  Hx of Psychiatric Hospitalization: " "  I[x]I Y  I[]I N  I[]I U  Hx of Outpatient Psychiatric Treatment (psychiatry/psychotherapy):   I[x]I Y  I[]I N  I[]I U  Psychotropic Trials: sertraline, buproprion xl, duloxetine, trazodone, xanax, valium  I[x]I Y  I[]I N  I[]I U  Prior Suicide Attempts:   I[x]I Y  I[]I N  I[]I U  Hx of Suicidal Ideation:   I[]I Y  I[x]I N  I[]I U  Hx of Homicidal Ideation:   I[]I Y  I[x]I N  I[]I U  Hx of Self-Injurious Behavior (Non-Suicidal):   I[]I Y  I[x]I N  I[]I U  Hx of Violence:   I[]I Y  I[x]I N  I[]I U  Documented Hx of Malingering:   I[]I Y  I[x]I N  I[]I U  Hx of Detox:   I[]I Y  I[x]I N  I[]I U  Hx of Rehab:     I[x]I Y  I[]I N  I[]I U  I[]I Former  Nicotine Use:    I[x]I Y  I[]I N  I[]I U  I[]I Former  Alcohol Consumption:  near daily - about 2 drinks per day  I[]I Y  I[x]I N  I[]I U  I[]I Former  Alcohol Misuse/Abuse:   I[]I Y  I[x]I N  I[]I U  I[]I Former  Illicit Drug Use/Misuse/Abuse:   I[]I Y  I[x]I N  I[]I U  I[]I Former  Misuse/Abuse of Rx Medications:   I[]I Cannabis  I[]I Cocaine  I[]I Heroin  I[]I Meth  I[]I Opioids  I[]I Stimulants  I[]I Benzos  I[]I Other:       FAMILY HISTORY:  I[x]I Y  I[]I N  I[]I U    Mother - depression    I[x]I Y  I[]I N  I[]I U  Hx of Trauma/Neglect:   I[x]I Y  I[]I N  I[]I U  Hx of Physical Abuse:   I[x]I Y  I[]I N  I[]I U  Hx of Sexual Abuse:   I[]I Y  I[x]I N  I[]I U  Significant Developmental Delay/Disability:   I[x]I Y  I[]I N  I[]I U  GED/High School Diploma or Beyond:   I[]I Y  I[x]I N  I[]I U  Currently Employed:   I[x]I Y  I[]I N  I[]I U  On or Applying for Disability:   I[]I Y  I[x]I N  I[]I U  Functions Independently: struggling with this lately  I[x]I Y  I[]I N  I[]I U  Financially Stable: "somewhat"  I[x]I Y  I[]I N  I[]I U  Domiciled: lives alone  I[x]I Y  I[]I N  I[]I U  Intact Support System: friend checks in on her  I[]I Y  I[x]I N  I[]I U  Currently in a Romantic Relationship:   I[x]I Y  I[]I N  I[]I U  Ever :    I[x]I Y  I[]I N  I[]I U  " Children/Dependents: 2 - in [place]  I[]I Y  I[x]I N  I[]I U  Christianity/Spiritual:   I[]I Y  I[x]I N  I[]I U   History:   I[]I Y  I[x]I N  I[]I U  Current Legal Issues:   I[]I Y  I[x]I N  I[]I U  Past Charges/Convictions:   I[]I Y  I[x]I N  I[]I U  Hx of Incarceration:   I[]I Y  I[x]I N  I[]I U  Access to a Gun:   NOTE: patient counseled on gun safety.  NOTE: patient counseled on increased risks associated with gun ownership.    I[]I Y  I[x]I N  I[]I U  Hx of Seizure:   I[]I Y  I[x]I N  I[]I U  Hx of Significant Head Injury (e.g., Loss of Consciousness, Concussion, Coma):   I[x]I Y  I[]I N  I[]I U  Medical History & Diagnoses:     The patient's past medical history has been reviewed and updated as appropriate within the electronic medical record system.  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  2022-10: Recurrent adenocarcinoma of left lung  2022-10: Syndrome of inappropriate secretion of antidiuretic hormone  2022-10: Other nonthrombocytopenic purpura  2022-10: Disorder of joint prosthesis  2022-09: Pneumothorax after biopsy  2022-07: Iron deficiency anemia due to chronic blood loss  2022-07: Aortic atherosclerosis  2022-06: MDD (major depressive disorder), recurrent severe, without   psychosis  2022-06: PTSD (post-traumatic stress disorder)  2022-04: Multiple thyroid nodules  2022-04: Severe obesity  2021-07: Diverticulitis  2021-07: Spinal stenosis  2021-05:  "Debility  2021-05: Acute on chronic respiratory failure with hypoxia and   hypercapnia  2021-05: Hyponatremia  2021-05: Hypocalcemia  2021-04: High anion gap metabolic acidosis  2021-04: Urinary tract infection without hematuria  2021-04: Urinary retention  2021-03: Choledocholithiasis  2021-03: Elevated LFTs  2021-03: History of alcohol abuse  2021-03: Chronic GI bleeding  2020-10: History of lung cancer  2019-08: Vitamin D deficiency  2019-08: Vitamin B12 deficiency  2019-08: Chronic pain syndrome  2018-12: History of L3 compression fracture  2018-09: Chronic diastolic (congestive) heart failure  2018-07: COPD exacerbation  2018-07: Acute diastolic congestive heart failure  2018-05: Hyperkalemia  2018-04: Diverticulosis  2017: CKD (chronic kidney disease) stage 3, GFR 30-59 ml/min  2017-04: Pulmonary nodule  2016-12: Hypomagnesemia  2016-11: Generalized anxiety disorder  2016-09: Hyperlipidemia  2016-09: Osteoporosis  2016-03: Chronic obstructive pulmonary disease  2016-03: Opioid dependence  2016-03: Essential hypertension  2016-03: Iron deficiency anemia  2016-03: Tobacco dependence  Cervical spinal stenosis      Scheduled and PRN Medications: The electronic chart was reviewed and updated as appropriate.  See Medcard for details.           Allergies:  Wellbutrin [bupropion hcl], Zoloft [sertraline], and Bananas [banana]    Additional Relevant History, As Applicable:       EXAMINATION:     BP (!) 141/65 (Patient Position: Lying)   Pulse 73   Temp 96.1 °F (35.6 °C) (Oral)   Resp 18   Ht 5' 5.98" (1.676 m)   Wt 88.5 kg (195 lb 1.7 oz)   SpO2 97%   BMI 31.51 kg/m²     MENTAL STATUS EXAMINATION:  General Appearance & Behavior: in hospital garb, lying in bed  Involuntary Movements and Motor Activity: no abnormal movements noted  Speech & Language: decreased spontaneity but conversational  Mood: depressed/anxious  Affect: euthymic, pleasant, reactive  Thought Process & Associations: linear, ruminative  Thought " Content & Perceptions: no evidence of psychosis  Sensorium and Cognition: alert, oriented x person and place but not time, significant memory and attention impairments noted  Insight & Judgment: both impaired      RISK MANAGEMENT:     Risk Parameters:  I[]I Y  I[x]I N  I[]I U  I[]I A  Suicidal Ideation/Behavior: **   I[]I Y  I[x]I N  I[]I U  I[]I A  Homicidal Ideation/Behavior: **  I[]I Y  I[x]I N  I[]I U  I[]I A  Violence: **  I[]I Y  I[x]I N  I[]I U  I[]I A  Self-Injurious Behavior: **     The patient is deemed to be a reliable and factually accurate historian.    I[]I Y  I[x]I N  I[]I U  I[]I A  I[]I N/A  Minimization of Risk Parameters Suspected/Evident: **  I[]I Y  I[x]I N  I[]I U  I[]I A  I[]I N/A  Exaggeration of Risk Parameters Suspected/Evident: **     The patient was able to satisfactorily contract for safety and was noted to be future oriented.  Protective factors were identified.    Current risk is judged to be:   I[x]I Low    I[]I Moderate   I[]I High    [] Y  [x] N  I[]I U  I[]I N/A  Danger to Self:   [] Y  [x] N  I[]I U  I[]I N/A  Danger to Others:   [] Y  [x] N  I[]I U  I[]I N/A  Grave Disability:        Mars Johnson MD  Department of Psychiatry, [facility] Board Certified, Psychiatry and Addiction Medicine      MANAGEMENT:     I[]I Y = Yes / Present / Endorses.  I[]I N = No / Absent / Denies.  I[]I U = Unknown / Unable to Assess / Unwilling to Participate.  I[]I A = Ambiguity Exists / Accuracy Uncertain.  I[]I D = Denial or Minimization is Suspected/Evident.  I[]I N/A = Non-Applicable.    Chart Review: Available documentation has been reviewed, and pertinent elements of the chart have been incorporated into this evaluation where appropriate.      [x] In cases of emergencies (e.g. SI/HI resulting in danger to self or others, functioning deteriorates to the level of grave disability), call 911 or [phone], or present to the emergency department for immediate assistance.  [x] Patient  should not operate a motor vehicle or heavy machinery if effects of medications or underlying symptoms/condition impair the ability to safely do so.  [x] Comply with ANY/ALL medication fully as prescribed/instructed and report ANY/ALL suspected adverse effects to appropriate health care providers.    Written material has been provided to supplement, augment, and reinforce any discussions and interventions, via the AVS and/or other pre-printed handouts.  Alcohol, Tobacco, and Drug Counseling, as well as applicable resources, has been provided, as warranted.  Shared medical decision making and informed consent are the hallmark and bedrock of good clinical care, and as such have been employed and obtained, respectively, to the degree possible.  Risk Mitigation Strategies, Harm Reduction Techniques, and Safety Netting are important interventions that can reduce acute and chronic risk, and as such have been employed to the degree possible.  Prescription Drug Management entails the review, recommendation, or consideration without recommendation of medications, and as such was employed during the encounter.  Additional Psychoeducation has been provided, as warranted.      -- Discussed, to the extent possible, diagnosis, risks and benefits of proposed treatment vs alternative treatments vs no treatment, potential side effects of these treatments and the inherent unpredictability of treatment. The patient's ability to understand, participate and engage in a conversation surrounding this was deemed to be: sufficient.      DIAGNOSTIC TESTING:     Blood Counts, Electrolytes & Glucose:   Lab Results   Component Value Date    WBC 6.71 06/07/2023    GRAN 4.0 06/07/2023    GRAN 59.5 06/07/2023    HGB 8.8 (L) 06/07/2023    HCT 25.6 (L) 06/07/2023    MCV 94 06/07/2023     06/07/2023     (L) 06/08/2023    K 4.3 06/08/2023    CALCIUM 7.8 (L) 06/08/2023    PHOS 2.9 06/08/2023    MG 1.3 (L) 06/08/2023    CO2 30 (H)  06/08/2023    ANIONGAP 7 (L) 06/08/2023    GLU 83 06/08/2023    HGBA1C 4.4 02/13/2023       Renal, Liver, Pancreas, Thyroid, Parathyroid, Prolactin, CPK, Lipids & Vitamin Levels:   Lab Results   Component Value Date    CREATININE 0.6 06/08/2023    BUN 10 06/08/2023    EGFRNORACEVR >60 06/08/2023    SPECGRAV 1.010 06/05/2023    PROTEINUA Negative 06/05/2023    AST 41 (H) 06/08/2023    ALT 20 06/08/2023    ALKPHOS 170 (H) 06/08/2023    BILITOT 0.4 06/08/2023    LABPROT 10.3 09/12/2022    ALBUMIN 1.6 (L) 06/08/2023    AMMONIA 51 (H) 04/29/2021    INR 1.0 09/12/2022    LIPASE 71 (H) 01/19/2023    TSH 1.695 03/13/2023    FREET4 1.01 01/17/2023    PTH 63.0 03/18/2022    CPK 95 06/05/2023    CHOL 123 02/15/2022    TRIG 87 02/15/2022    LDLCALC 57.6 (L) 02/15/2022    HDL 48 02/15/2022    ENLYMFGS16 751 02/15/2022    FOLATE 9.0 08/09/2021    RHALLCBA87LS 51 03/18/2022       Therapeutic Drug Levels:   No results found for: LITHIUM, VALPROATE, CBMZ, LAMOTRIGINE, CLOZAPINE, NORCLOZAP, CLOZNORCLOZ    Addiction:   Lab Results   Component Value Date    PCDSOALCOHOL <10 03/18/2022    PCDSOBENZOD Negative 03/18/2022    BARBITURATES Negative 03/18/2022    PCDSCOMETHA Negative 03/18/2022    OPIATESCREEN Presumptive Positive (A) 03/18/2022    COCAINEMETAB Negative 03/18/2022    AMPHETAMINES Negative 03/18/2022    MARIJUANATHC Negative 03/18/2022    PCDSOPHENCYN Negative 03/18/2022    ALCOHOLMEDIC 125 (H) 12/05/2016       Results for orders placed or performed during the hospital encounter of 02/13/23   EKG 12-lead    Collection Time: 02/13/23  7:43 AM    Narrative    Test Reason :     Vent. Rate : 123 BPM     Atrial Rate : 125 BPM     P-R Int : 188 ms          QRS Dur : 126 ms      QT Int : 314 ms       P-R-T Axes : 090 100 -53 degrees     QTc Int : 449 ms     Suspect arm lead reversal, interpretation assumes no reversal  Sinus tachycardia with occasional Premature ventricular complexes  Rightward axis  Nonspecific intraventricular  block  Cannot rule out Anteroseptal infarct (cited on or before 28-DEC-2016)  T wave abnormality, consider inferolateral ischemia  Abnormal ECG  When compared with ECG of 13-FEB-2023 01:25,  Premature ventricular complexes are now Present  T wave inversion more evident in Inferior leads  T wave inversion now evident in Lateral leads  Confirmed by Zonia SOLOMON, Barak GRACE ([id number]) on 2/13/2023 2:34:03 PM    Referred By: [email or web address]   SELF           Confirmed By:Barak Brar MD       No results found. However, due to the size of the patient record, not all encounters were searched. Please check Results Review for a complete set of results.    TELEPSYCHIATRY:     Patient agreeable to consultation via telepsychiatry.    This consultation was requested by Khadar Gallardo MD, the attending physician.  The location of the consulting psychiatrist is: [place], [place]  The patient location is:  [facility]*  Consultation Setting: inpatient unit  Also present with the patient at the time of the evaluation: patient evaluated alone    Inpatient consult to Telemedicine - Psych  Consult performed by: Mars Johnson MD  Consult ordered by: Iron Ag MD        Complexity (level) of medical decision making employed in the encounter: MODERATE    Consult Start Time: 6/8/2023 10:00 PM CDT  Consult End Time: 6/8/2023 10:42 PM CDT  Time with Patient: 22 minutes  Total Time Spent Providing E/M Services: 42 minutes

## 2023-06-09 NOTE — ASSESSMENT & PLAN NOTE
Temp Readings from Last 3 Encounters:   06/09/23 97.7 °F (36.5 °C) (Oral)   03/13/23 98.1 °F (36.7 °C)   03/06/23 98.3 °F (36.8 °C) (Oral)     Lab Results   Component Value Date    WBC 6.71 06/07/2023     No results for input(s): LACTATE in the last 72 hours.    Has completed course of IV Rocephin    Cultures were taken-   Microbiology Results (last 7 days)     Procedure Component Value Units Date/Time    Urine culture [[id number]]  (Abnormal)  (Susceptibility) Collected: 06/05/23 0216    Order Status: Completed Specimen: Urine Updated: 06/07/23 1926     Urine Culture, Routine KLEBSIELLA PNEUMONIAE  >100,000 cfu/ml  No other significant isolate      Narrative:      Specimen Source->Urine

## 2023-06-09 NOTE — TELEPHONE ENCOUNTER
If she is able to come sooner then this would be best - however if not then will arrange at the soonest date she is available

## 2023-06-09 NOTE — PLAN OF CARE
Tressa spoke to Yissel at [facility]. This apartment is an Independent Living Apartment Complex. Ms. Dey said that the patient would be able to return to her apartment whenever she is ready.      [facility]  [phone]

## 2023-06-09 NOTE — CARE UPDATE
06/08/23 1954   Patient Assessment/Suction   Level of Consciousness (AVPU) alert   Respiratory Effort Normal;Unlabored   Expansion/Accessory Muscles/Retractions no use of accessory muscles;no retractions   All Lung Fields Breath Sounds wheezes, expiratory   Skin Integrity   $ Wound Care Tech Time 15 min   Area Observed Left;Right;Behind ear;Nares   Skin Appearance without discoloration   PRE-TX-O2   Device (Oxygen Therapy) nasal cannula   $ Is the patient on Low Flow Oxygen? Yes   Flow (L/min) 2   SpO2 97 %   Pulse Oximetry Type Intermittent   $ Pulse Oximetry - Multiple Charge Pulse Oximetry - Multiple   Pulse 73   Resp 18   Aerosol Therapy   $ Aerosol Therapy Charges Aerosol Treatment   Daily Review of Necessity (SVN) completed   Respiratory Treatment Status (SVN) given   Treatment Route (SVN) mask;oxygen   Patient Position (SVN) HOB elevated   Post Treatment Assessment (SVN) breath sounds unchanged   Signs of Intolerance (SVN) none

## 2023-06-09 NOTE — DISCHARGE INSTRUCTIONS
Thank you for allowing me to participate as part of your health care team, and thank you for choosing [facility].    WILLARD MARTE MD  Board Certified in Psychiatry & Addiction Medicine      IN CASE OF SUICIDAL THINKING, call the National Suicide Hotline Number: 988    988 Suicide & Crisis Lifeline: 988 , 1-151-384-TALK (8255)  [email or web address]           AFTER VISIT INSTRUCTIONS:     [x] Take all medication, from all providers, as prescribed.  [x] If questions or concerns arise, or if experiencing side effects, adverse reactions or worsening symptoms, contact your provider through the [facility] portal at [email or web address], or call [phone] to reach the [facility] main line.  [x] In cases of emergencies, call 251 or [phone], or present directly to the emergency department for immediate assistance.      INFORMATION ON MENTAL HEALTH MEDICATIONS:     National [place] of Mental Health:   https://www.nimh.nih.gov/health/topics/mental-health-medications     Web MD:   https://www.Zhuhai OmeSoft.Genbook       RESOURCES:     IN CASE OF SUICIDAL THINKING, call the [employer] Suicide Hotline Number: 988    988 Suicide & Crisis Lifeline: 988 , 2-686-604-TALK (8255)  Provides 24/7, free and confidential support for people in distress, prevention and crisis resources for you or your loved ones, and best practices for professionals.    Call, text or chat.  https://Splash.org     National Action [place] for Suicide Prevention: the National [place] for Suicide Prevention (Action [place]) is the nations public-private partnership for suicide prevention, working with more than 250 national partners.   https://theactionalliance.org     National Strategy for Suicide Prevention & Risk Mitigation:  https://theactionalliance.org/our-strategy/national-strategy-suicide-prevention     [x] Fact Sheet:   https://www.hhs.gov/sites/default/files/national-strategy-for-suicide-prevention-factsheet.pdf     [x] Report:    https://www.ncbi.nlm.nih.gov/books/VRK338810/pdf/Bookshelf_NBK109917.pdf     Suicide Prevention Resource Center: The Suicide Prevention Resource Center (SPR) is the only federally supported resource center devoted to advancing the implementation of the National Strategy for Suicide Prevention. [facility] is funded by the U.S. Department of Health and Human Services' Substance Abuse and Mental Health Services Administration (SAMA).  https://www.[facility].org     [x] Safety Plan:   https://[employer]/wp-content/uploads/2021/08/Megha-Safety-Plan-8-6-21.pdf     [x] Suicide Risk Curve:  https://[employer]/wp-content/uploads/2021/08/Wafjype-nmih-hufjt-8-6-21.pdf     [place] Mental Health Advocacy Service: the state agency tasked with protecting the legal rights of people with behavioral health diagnoses.  https://mhas.[place].[facility]     Alcoholics Anonymous (AA): find a meeting near you.  https://www.aa.org     SMI Adviser: resources for individuals and families with serious mental illness.  https://smiadviser.org     National [place] for the Mentally Ill (GORGE): the nation's largest grassroots organization dedicated to building better lives for individuals with mental illness.  https://www.gorge.org/Home     U.S. Department of Health and Human Services (HHS): the mission of HHS is to enhance the health and well-being of all Americans, by providing for effective health and human services and by fostering sound, sustained advances in the sciences underlying medicine, public health, and .   https://www.hhs.gov     Substance Abuse and Mental Health Services Administration (SAMHSA): SAMHSA is the agency within [facility] that leads public health efforts to advance the behavioral health of the nation. SAMHSA's mission is to reduce the impact of substance abuse and mental illness on [place] communities.   https://www.samhsa.gov     National Institutes of Health (NIH): a part of [facility], [facility] is  the largest biomedical research agency in the world.   https://www.nih.gov     National [place] on Drug Abuse (MOISE): sponsored by the NIH, the mission of MOISE is to advance science on drug use and addiction and to apply that knowledge to improve individual and public health.  https://moise.nih.gov     National [place] on Alcohol Abuse and Alcoholism (NIAAA): sponsored by the NIH, the mission of NIAA is to generate and disseminate fundamental knowledge about the effects of alcohol on health and well-being, and apply that knowledge to improve diagnosis, prevention, and treatment of alcohol-related problems, including alcohol use disorder, across the lifespan.   https://www.niaaa.nih.gov     National Harm Reduction Coalition: resources for harm reduction, including techniques, strategies, policy, and advocacy.  https://harmreduction.org     The SHARE Approach - A Model for Shared Decision Making:  [x] Fact Sheet  https://www.[facility].gov/sites/default/files/publications/files/share-approach_factsheet.pdf     AMA Principles of Medical Ethics - Informed Consent & Shared Decision Making:  [x] Chapter  https://www.ama-assn.org/system/files/2019-06/code-of-medical-hyjcet-rqndqhd-4.pdf     Safety Netting for Primary Care:  [x] Article  https://www.ncbi.nlm.nih.gov/pmc/articles/MAO4682679/pdf/luvqbhq-6990--e70.pdf       MEDICATION MANAGEMENT:     [x] In addition to the potential beneficial effects, the use of any medication or drug (prescribed, over the counter or otherwise) carries with it the risk of potential adverse effects.  Each has a set of typical adverse effects - some common, some rare - but idiosyncratic and unanticipated reactions unique to you are always possible.      [x] It is important to remember that untreated illness can also pose a risk, which must be taken into account when weighing the pros and cons of a medication trial.    [x] Medications and drugs can sometimes interact with each other in the  body, leading to adverse effects - it is important that all your providers know all the medications and drugs you take - prescribed, over the counter, or otherwise.  Keep all your practitioners up to date with any changes.  It's always a good idea to keep an up-to-date list in an easily accessible location.    [x] There is an inherent unpredictability to all treatment, including the use of medication.  Unexpected outcomes can occur - keep me up to date with any difficulties you encounter.    [x] It is important to take medication as directed, and to comply fully with the instructions.  Check with the appropriate provider first before adjusting or stopping your medication on your own.    If you require further information pertaining to the issues outlined above, please reach out to your providers through the [facility] portal at [email or web address], or call [phone] to discuss.  See resource list for additional material.     Additional information can be provided pertaining to your diagnosis, intended outcomes, target symptoms for treatment, and possible benefits and risks of medication - you can also access this information through the provided resources.  Possible alternatives to the current treatment plan (including no treatment) can also be reviewed.      GENERAL HEALTH & WELLNESS:     [x] Establish and follow regularly with a primary care physician for routine health maintenance and management of any medical comorbidities.  [x] Follow a healthy diet, exercise routinely, and monitor weight and metabolic parameters.  [x] Allow adequate time for sleep and practice good sleep hygiene.  [x] Do not operate a motor vehicle or heavy machinery if the effects of medications or the symptoms underlying your condition impair the ability for you to do so safely.    Dietary Guidelines for Americans, 8488-2370:  U.S. Department of Agriculture  (USDA)  https://www.dietaryguidelines.gov/sites/default/files/2020-12/Dietary_Guidelines_for_Americans_2020-2025.pdf#page=31     The Nutrition Source:  [facility] of Public Health  https://www.[facility].[place].[facility]/nutritionsource       SLEEP HYGIENE:     Follow these tips to establish healthy sleep habits:  [x] Keep a consistent sleep schedule. Get up at the same time every day, even on weekends or during vacations.  [x] Set a bedtime that is early enough for you to get at least 7-8 hours of sleep.  [x] Don't go to bed unless you are sleepy.  [x] If you don't fall asleep after 20 minutes, get out of bed. Go do a quiet activity without a lot of light exposure. It is especially important to not get on electronics.  [x] Establish a relaxing bedtime routine.  [x] Use your bed only for sleep and sex.  [x] Make your bedroom quiet and relaxing. Keep the room at a comfortable, cool temperature.  [x] Limit exposure to bright light in the evenings.  [x] Turn off electronic devices at least 30 minutes before bedtime.  [x] Don't eat a large meal before bedtime. If you are hungry at night, eat a light, healthy snack.  [x] Exercise regularly and maintain a healthy diet.  [x] Avoid consuming caffeine in the afternoon or evening.  [x] Avoid consuming alcohol before bedtime.  [x] Reduce your fluid intake before bedtime.    QUICK TIPS FOR BETTER SLEEP  Reduce smartphone usage Create and maintain a nightly ritual Avoid caffeine 4-6 hours before sleeping Don't eat or drink too much at bedtime Sleep at the same time every night        American Academy of Sleep Medicine - Healthy Sleep Habits:  https://sleepeducation.org/healthy-sleep/healthy-sleep-habits     American Academy of Sleep Medicine - Bedtime Calculator:  https://sleepeducation.org/healthy-sleep/bedtime-calculator     American Academy of Sleep Medicine - Cognitive Behavioral Therapy for Insomnia (CBT-I):  https://sleepeducation.org/patients/cognitive-behavioral-therapy      American Academy of Sleep Medicine - Insomnia:  https://sleepeducation.org/sleep-disorders/insomnia       ALCOHOL & DRUG USE COUNSELING:     Preventing Excessive Alcohol Use (CDC):  https://www.cdc.gov/alcohol/fact-sheets/moderate-drinking.htm#:~:text=To%20reduce%20the%20risk%20of,days%20when%20alcohol%20is%20consumed.     [x] Alcohol consumption is associated with a variety of short- and long-term health risks, including motor vehicle crashes, violence, sexual risk behaviors, high blood pressure, and various cancers (e.g., breast cancer).  [x] The risk of these harms increases with the amount of alcohol you drink. For some conditions, like some cancers, the risk increases even at very low levels of alcohol consumption (less than 1 drink).  [x] To reduce the risk of alcohol-related harms, the 8656-7338 Dietary Guidelines for Americans recommends that adults of legal drinking age can choose not to drink, or to drink in moderation by limiting intake to 2 drinks or less in a day for men or 1 drink or less in a day for women, on days when alcohol is consumed.  [x] The Guidelines also do not recommend that individuals who do not drink alcohol start drinking for any reason and that if adults of legal drinking age choose to drink alcoholic beverages, drinking less is better for health than drinking more.  [x] The Guidelines note that some people should not drink alcohol at all, such as:  - If they are pregnant or might be pregnant.  - If they are younger than age 21.  - If they have certain medical conditions or are taking certain medications that can interact with alcohol.  - If they are recovering from an alcohol use disorder or if they are unable to control the amount they drink.  [x] The Guidelines also note that not drinking alcohol is the safest option for women who are lactating. Generally, moderate consumption of alcoholic beverages by a woman who is lactating (up to 1 standard drink in a day) is not known to  "be harmful to the infant, especially if the woman waits at least 2 hours after a single drink before nursing or expressing breast milk. Women considering consuming alcohol during lactation should talk to their healthcare provider.  [x] The Guidelines note, Emerging evidence suggests that even drinking within the recommended limits may increase the overall risk of death from various causes, such as from several types of cancer and some forms of cardiovascular disease. Alcohol has been found to increase risk for cancer, and for some types of cancer, the risk increases even at low levels of alcohol consumption (less than 1 drink in a day).  [x] Although past studies have indicated that moderate alcohol consumption has protective health benefits (e.g., reducing risk of heart disease), recent studies show this may not be true.  [x] Its important to focus on the amount people drink on the days that they drink. Even if women consume an average of 1 drink per day or men consume an average of 2 drinks per day, binge drinking increases the risk of experiencing alcohol-related harm in the short-term and in the future.    Drinking Levels Defined (NIAAA):  https://www.niaaa.nih.gov/alcohol-health/overview-alcohol-consumption/moderate-binge-drinking     Drinking in Moderation:  According to the "Dietary Guidelines for Americans 4251-7643, U.S. Department of Health and Human Services and U.S. Department of Agriculture, adults of legal drinking age can choose not to drink or to drink in moderation by limiting intake to 2 drinks or less in a day for men and 1 drink or less in a day for women, when alcohol is consumed. Drinking less is better for health than drinking more.    Binge Drinking:  NIAAA defines binge drinking as a pattern of drinking alcohol that brings blood alcohol concentration (MAYANK) to 0.08 percent - or 0.08 grams of alcohol per deciliter - or higher.  For a typical adult, this pattern corresponds to consuming 5 " or more drinks (male), or 4 or more drinks (female), in about 2 hours.    The Substance Abuse and Mental Health Services Administration (SAMHSA), which conducts the annual National Survey on Drug Use and Health (NSDUH), defines binge drinking as 5 or more alcoholic drinks for males or 4 or more alcoholic drinks for females on the same occasion (i.e., at the same time or within a couple of hours of each other) on at least 1 day in the past month.    Heavy Alcohol Use:  NIAAA defines heavy drinking as follows:  - For men, consuming more than 4 drinks on any day or more than 14 drinks per week.  - For women, consuming more than 3 drinks on any day or more than 7 drinks per week.     [facility] defines heavy alcohol use as binge drinking on 5 or more days in the past month.    Patterns of Drinking Associated with Alcohol Use Disorder:  Binge drinking and heavy alcohol use can increase an individual's risk of alcohol use disorder.    Certain people should avoid alcohol completely, including those who:  - Plan to drive or operate machinery, or participate in activities that require skill, coordination, and alertness.  - Take certain over-the-counter or prescription medications.  - Have certain medical conditions.  - Are recovering from alcohol use disorder or are unable to control the amount that they drink.  - Are younger than age 21.  - Are pregnant or may become pregnant.    U.S. Standard Drink  12 oz beer   (5% ABV) 8 oz malt liquor   (7% ABV) 5 oz wine   (12% ABV) 1.5 oz 80-proof distilled spirit  (40% ABV)        Heroin use harm reduction:  1. Carry naloxone. When using heroin, make sure you have at least one dose of naloxone - the overdose reversal drug - and have it in plain view. Understand how to give it.  2. Try a small dose first. It is best to first try a small amount of the heroin to check the effect.  3. Dont use heroin alone. Always use heroin with someone else and take turns while using.    It is possible to  overdose with heroin whether you are snorting, injecting or using it in another form.    Signs of an overdose or emergency:   - The person is awake but unable to talk.  - Their body is limp.  - Their breathing is shallow or slow or stopped.  - Their skin is pale, ashen or clammy/sweaty.  - They are unconscious.    In case of emergency, give naloxone. If you suspect the heroin may contain fentanyl, administer more than one dose. Seek medical help even if naloxone has been given. Call 911 for help.      ADHD TREATMENT AND STIMULANT MEDICATIONS:     [facility] Prescription Stimulants Drug Facts  CMS Stimulant and Related Medications: Use in Adults  [facility] Drug Fact Sheets: Stimulants  FDA Drug Safety Communication: Stimulants  [facility] ADHD  WebMD ADHD Medications and Side Effects  [facility]: ADHD Medication      SHARED DECISION MAKING & INFORMED CONSENT:     Shared medical decision making and informed consent are the hallmark and bedrock of excellent clinical care.  During the encounter, shared medical decision making was employed and informed consent was obtained, to the degree possible, whenever feasible, appropriate and relevant. Those interventions are supplemented here with written materials, detailing the topics in more depth.       PSYCHOEDUCATION:     Psychoeducation pertaining to the following -     Diagnosis Etiology Disease Processes Natural Progression   Treatment Options Time Course Safety Netting Informed Consent   Intended Benefits of Medication Expectable Adverse Effects Target Symptoms for Treatment Alternatives to Current Treatment   Shared   Decision Making Risk Mitigation Strategies Harm Reduction Techniques Associated Bio-Med Complications     - can be further discussed and reviewed (you can also access additional information through the provided resources in this document).      Effective communication is essential in order to engage in shared medical decision making.  If you had difficulty understanding  anything during your encounter or in this supplementary document, please contact your providers through the [facility] portal at [email or web address] or call [phone].     Claudio Dictionary  https://dictionary.claudio.org/us       It can be easy to miss, forget, or misremember important important information that was discussed during the session - especially when you're stressed, upset, or don't feel well.  If you or a representative have any additional questions, concerns, or topics to discuss - please contact your providers through the [facility] portal at [email or web address] or call [phone].    Memory Loss  https://www.DataRobot.Spot Influence/brain/memory-loss    Causes of Memory Loss  https://www.[employer] Quote Save/what-causes-memory-loss-8031392    Memory loss: When to seek help  https://www.[facility].org/diseases-conditions/alzheimers-disease/in-depth/memory-loss/art-39540826    Memory, Forgetfulness, and Aging: What's Normal and What's Not?  https://www.jung.nih.gov/health/memory-forgetfulness-and-aging-whats-normal-and-whats-not    Depression and Memory Loss  https://www.iDreamBooks/health/depression/depression-and-memory-loss    The Relationship Between Anxiety and Memory Loss  https://www.[facility]/academics/blog-posts/the-relationship-between-anxiety-and-memory-loss     PRESCRIPTION DRUG MANAGEMENT:     Prescription Drug Management entails the following:  [x] The review, recommendation, or consideration without recommendation of medications during the encounter.  [x] Discussion (to the extent possible) with the patient and/or other interested parties of the diagnosis, target symptoms, intended outcomes, and possible benefits and risks of medication, as well as alternatives (including no treatment), if not otherwise known or stated prior.  [x] Discussion (to the extent possible) with the patient and/or other interested parties of possible expectable adverse effects of any proposed individual  psychotropic agents, as well as the inherent unpredictability of treatment, if not otherwise known or stated prior.  [x] Informed consent is sought from the patient (and/or guardian/designated decision maker, if applicable) after a thorough discussion (to the extent possible) of the aforementioned points outlined above.  [x] The provision of counseling (to the extent possible) to the patient and/or other interested parties on the importance of full compliance with any prescribed medication, if not otherwise known or stated prior.    Information on psychotropic medication can be found at:   National [place] of Mental Health: Information on Mental Health Medications      RISK MITIGATION, HARM REDUCTION & SAFETY NETTING:     Risk Mitigation Strategies, Harm Reduction Techniques, and Safety Netting are important interventions that can reduce acute and chronic risk.  As such, opportunities were sought to incorporate psychoeducation and practical advice pertaining to these topics into the encounter, to the degree possible, whenever feasible, appropriate and relevant.  Those interventions are supplemented here with written materials, detailing the topics in more depth.       RISK MITIGATION STRATEGIES:     Risk mitigation strategies are used to reduce the likelihood of future episodes of suicide, homicide, violence, and/or other problematic behaviors (e.g. self-injurious, risky, addictive, compulsive, impulsive). The following are examples of risk mitigation strategies which you can employ in order to reduce your overall burden of risk.     [x] Treatment of underlying psychopathology driving acute and chronic risk to the extent possible.  [x] Use of self administered rating scales and journaling to assist in risk tracking.  [x] Exploration of protective factors to potentially counterbalance risk.  [x] Identification and avoidance of triggers and situations that increase risk, including excessive alcohol and drug  use.  [x] Timely follow up and ongoing treatment of mental health issues moving forward.  [x] Full compliance with medication regimen.  [x] A good working knowledge of your medication regimen, including specific instructions on the administration of the medications.  [x] Consultation with an appropriate medical provider prior to altering or deviating from these instructions on your own.  [x] Active involvement and participation of family and natural support wherever feasible and possible.  [x] Development and review of coping strategies that can be immediately deployed in times of acute crisis.  [x] Implementation of home safety practices and the removal/reduction of access to lethal means (including, but not limited to, firearms, certain types and quantities of medication, poisons, or other methods you may have contemplated or identified).  [x] Collaborative development of a written safety plan with your treatment team and loved ones that can be immediately referred to in times of acute crisis.  [x] Utilization of a safety contract to engage your treatment team and further assess/manage risk.  [x] A good working knowledge of how to access emergency treatment in times of acute crisis.  [x] Utilization of suicide hotlines number (988) and resources in times of crisis.    If you require further information pertaining to the issues outlined above, please reach out to your providers through the [facility] portal at [email or web address], or call [phone] to discuss.  See resource list for additional material.      SAFETY NETTING:     In healthcare, safety netting refers to the provision of information to help patients or carers identify the need to consult a health care professional if a health concern arises or changes.  The relevance of this advice is most obvious with chronic mental illnesses, as their dynamic nature, with symptoms and signs emerging at different times and in different combinations, makes safety  netting particularly important.  Specific safety net advice for you includes the following:    [x] The existence of uncertainty. Mental health diagnoses and conditions contain at least some degree of uncertainty - knowing this, you should feel empowered to reconsult if necessary.  [x] What exactly to look out for. Given the recognised risk of possible deterioration or the development of complications, you should become familiar with the specific clinical features (including red flags) to look out for.    [x] How exactly to seek further help. You should know how and where to seek further help if needed.  Make a plan in advance and keep it handy.  It's also a good idea to share the plan with your treatment providers and loved ones.  [x] What to expect about time course. Mental health diagnoses and conditions often have an expected time course, which is important information for you to know.  However, if your difficulties do not conform to this time line and concerns arise, do not delay seeking further medical advice.    If you require further information pertaining to the issues outlined above, please reach out to your providers through the [facility] portal at [email or web address], or call [phone] to discuss.  See resource list for additional material.      HARM REDUCTION:     Harm Reduction techniques are used in an effort to reduce negative consequences associated with risky and maladaptive behaviors, until cessation of the problematic behaviors can be established.  Harm reduction is best thought of as a journey and not a destination; it is not an endorsement of problematic behavior, but an acknowledgement and recognition of the step-by-step nature of recovery.      Although commonly employed in working with people who suffer with drug addiction, harm reduction can be more broadly applied to any problematic behavior.    Harm Reduction and Substance Abuse:  [x] Incorporates a spectrum of strategies that  includes safer use, managed use, abstinence, meeting people who use drugs where theyre at, and addressing conditions of use along with the use itself.  [x] Accepts, for better or worse, that licit and illicit drug use is part of our world and chooses to work to minimize its harmful effects rather than simply ignore or condemn them.  [x] Understands drug use as a complex, multi-faceted phenomenon that encompasses a continuum of behaviors from severe use to total abstinence, and acknowledges that some ways of using drugs are clearly safer than others.  [x] Calls for the non-judgmental, non-coercive provision of services and resources to people who use drugs and the communities in which they live in order to assist them in reducing attendant harm.  [x] Affirms people who use drugs themselves as the primary agents of reducing the harms of their drug use and seeks to empower them to share information and support each other in strategies which meet their actual conditions of use.  [x] Does not attempt to minimize or ignore the real and tragic harm and danger that can be associated with illicit drug use.  [x] Meets people where they are, but seeks to not leave them there.  [x] Examples of specific interventions include, but are not limited to, narcan (naloxone), medication assisted treatment, syringe access, overdose prevention, and safer drug use techniques.    Key Harm Reduction Strategies: Opioid Use Disorder  [x] Safe Injection Sites & Equipment  [x] Managed Use  [x] Syringe Exchange Programs  [x] Fentanyl Test Strips  [x] Pharmacotherapy/Medication Assisted Treatment  [x] Narcan  [x] Good Taoist Laws  [x] Treatment Instead of shelter  [x] Diversion Programs  [x] Overdose Education  [x] Abstinence    Whether or not you struggle with substance abuse, any and all opportunities to employ harm reduction techniques to address difficult to change problematic behaviors should be sought and implemented - whenever and  "wherever feasible, relevant and applicable. Additionally, harm reduction techniques can be applied broadly, and are relevant for a multitude of situations - even those that do not involve problematic or maladaptive behaviors.     EXAMPLES OF HARM REDUCTION IN OTHER AREAS  SUN SCREEN SEAT BELTS SPEED LIMITS BIRTH CONTROL        If you require further information pertaining to the issues outlined above, please reach out to your providers through the [facility] portal at [email or web address], or call [phone] to discuss.  See resource list for additional material.      FIREARM SAFETY:     THE SIX BASIC GUN SAFETY RULES  There are six basic gun safety rules for gun owners to understand and practice at all times:  Treat all guns as if they are loaded. Always assume that a gun is loaded even if you think it is unloaded. Every time a gun is handled for any reason, check to see that it is unloaded. If you are unable to check a gun to see if it is unloaded, leave it alone and seek help from someone more knowledgeable about guns.  Keep the gun pointed in the safest possible direction. Always be aware of where a gun is pointing. A "safe direction" is one where an accidental discharge of the gun will not cause injury or damage. Only point a gun at an object you intend to shoot. Never point a gun toward yourself or another person.  Keep your finger off the trigger until you are ready to shoot. Always keep your finger off the trigger and outside the trigger guard until you are ready to shoot. Even though it may be comfortable to rest your finger on the trigger, it also is unsafe. If you are moving around with your finger on the trigger and stumble or fall, you could inadvertently pull the trigger. Sudden loud noises or movements can result in an accidental discharge because there is a natural tendency to tighten the muscles when startled. The trigger is for firing and the handle is for handling.  Know your target, its " surroundings and beyond. Check that the areas in front of and behind your target are safe before shooting. Be aware that if the bullet misses or completely passes through the target, it could strike a person or object. Identify the target and make sure it is what you intend to shoot. If you are in doubt, DON'T SHOOT! Never fire at a target that is only a movement, color, sound or unidentifiable shape. Be aware of all the people around you before you shoot.  Know how to properly operate your gun. It is important to become thoroughly familiar with your gun. You should know its mechanical characteristics including how to properly load, unload and clear a malfunction from your gun. Obviously, not all guns are mechanically the same. Never assume that what applies to one make or model is exactly applicable to another. You should direct questions regarding the operation of your gun to your firearms dealer, or contact the  directly.  Store your gun safely and securely to prevent unauthorized use. Guns and ammunition should be stored separately. When the gun is not in your hands, you must still think of safety. Use an approved firearms safety device on the gun, such as a trigger lock or cable lock, so it cannot be fired. Store it unloaded in a locked container, such as an approved lock box or a gun safe. Store your gun in a different location than the ammunition. For maximum safety you should use both a locking device and a storage container.    ADDITIONAL SAFETY POINTS  The six basic safety rules are the foundational rules for gun safety. However, there are additional safety points that must not be overlooked.  [x] Never handle a gun when you are in an emotional state such as anger or depression. Your judgment may be impaired. If you have acute or chronic suicidal ideation, a suicide plan, or suicidal intent, have firearms removed and your access restricted by a trusted loved one or other responsible individual  "or agency.  [x] Never shoot a gun in celebration (the Fourth of July or New Year's Myra, for example). Not only is this unsafe, but it is generally illegal. A bullet fired into the air will return to the ground with enough speed to cause injury or death.  [x] Do not shoot at water, flat or hard surfaces. The bullet can ricochet and hit someone or something other than the target.  [x] Hand your gun to someone only after you verify that it is unloaded and the cylinder or action is open. Take a gun from someone only after you verify that it is unloaded and the cylinder or action is open.  [x] Guns, alcohol and drugs don't mix. Alcohol and drugs can negatively affect judgment as well as physical coordination. Alcohol and any other substance likely to impair normal mental or physical functions should not be used before or while handling guns. Avoid handling and using your gun when you are taking medications that cause drowsiness or include a warning to not operate machinery while taking this drug.   [x] The loud noise from a fired gun can cause hearing damage, and the debris and hot gas that is often emitted can result in eye injury. Always wear ear and eye protection when shooting a gun.      GUNS AND CHILDREN - FIREARM OWNER RESPONSIBILITIES    You Cannot Be Too Careful with Children and Guns  [x] There is no such thing as being too careful with children and guns. Never assume that simply because a toddler may lack finger strength, they can't pull the trigger. A child's thumb has twice the strength of the other fingers. When a toddler's thumb "pushes" against a trigger, invariably the barrel of the gun is pointing directly at the child's face. NEVER leave a firearm lying around the house.  [x] Child safety precautions still apply even if you have no children or if your children have grown to adulthood and left home. A nephew, niece, neighbor's child or a grandchild may come to visit. Practice gun safety at all " "times.  [x] To prevent injury or death caused by improper storage of guns in a home where children are likely to be present, you should store all guns unloaded, lock them with a firearms safety device and store them in a locked container. Ammunition should be stored in a location separate from the gun.    Talking to Children About Guns  [x] Children are naturally curious about things they don't know about or think are "forbidden." When a child asks questions or begins to act out "gun play," you may want to address his or her curiosity by answering the questions as honestly and openly as possible. This will remove the mystery and reduce the natural curiosity. Also, it is important to remember to talk to children in a manner they can relate to and understand. This is very important, especially when teaching children about the difference between "real" and "make-believe." Let children know that, even though they may look the same, real guns are very different than toy guns. A real gun will hurt or kill someone who is shot.    Instill a Mind Set of Safety and Responsibility  [x] The American Academy of Pediatrics reports that adolescence is a highly vulnerable stage in life for teenagers struggling to develop traits of identity, independence and autonomy. Children, of course, are both naturally curious and innocently unaware of many dangers around them. Thus, adolescents as well as children may not be sufficiently safeguarded by cautionary words, however frequent. Contrary actions can completely undermine good advice. A "Do as I say and not as I do" approach to gun safety is both irresponsible and dangerous.  [x] Remember that actions speak louder than words. Children learn most by observing the adults around them. By practicing safe conduct you will also be teaching safe conduct.    Safety and Storage Devices  [x] If you decide to keep a firearm in your home you must consider the issue of how to store the firearm in a " safe and secure manner. There are a variety of safety and storage devices currently available to the public in a wide range of prices. Some devices are locking mechanisms designed to keep the firearm from being loaded or fired, but don't prevent the firearm from being handled or stolen. There are also locking storage containers that hold the firearm out of sight. For maximum safety you should use both a firearm safety device and a locking storage container to store your unloaded firearm.   Two of the most common locking mechanisms are trigger locks and cable locks. Trigger locks are typically two-piece devices that fit around the trigger and trigger guard to prevent access to the trigger. One side has a post that fits into a hole in the other side. They are locked by a key or combination locking mechanism. Cable locks typically work by looping a strong steel cable through the action of the firearm to block the firearm's operation and prevent accidental firing. However, neither trigger locks nor cable locks are designed to prevent access to the firearm.   [x] Smaller lock boxes and larger gun safes are two of the most common types of locking storage containers. One advantage of lock boxes and gun safes is that they are designed to completely prevent unintended handling and removal of a firearm. Lock boxes are generally constructed of sturdy, high-grade metal opened by either a key or combination lock. Gun safes are quite heavy, usually weighing at least 50 pounds. While gun safes are typically the most expensive firearm storage devices, they are generally more reliable and secure.     Remember: Safety and storage devices are only as secure as the precautions you take to protect the key or combination to the lock.    RULES FOR KIDS  Adults should be aware that a child could discover a gun when a parent or another adult is not present. This could happen in the child's own home; the home of a neighbor, friend or  relative; or in a public place such as a school or park. If this should happen, a child should know the following rules and be taught to practice them.   Stop  The first rule for a child to follow if he/she finds or sees a gun is to stop what he/she is doing.  Don't Touch!  The second rule is for a child not to touch a gun he/she finds or sees. A child may think the best thing to do if he/she finds a gun is to pick it up and take it to an adult. A child needs to know he/she should NEVER touch a gun he/she may find or see.  Leave the Area  The third rule is to immediately leave the area. This would include never taking a gun away from another child or trying to stop someone from using gun.  Tell an Adult  The last rule is for a child to tell an adult about the gun he/she has seen. This includes times when other kids are playing with or shooting a gun.     METHODS OF CHILDPROOFING YOUR FIREARM  As a responsible handgun owner, you must recognize the need and be aware of the methods of childproofing your handgun, whether or not you have children.  Whenever children could be around, whether your own, or a friend's, relative's or neighbor's, additional safety steps should be taken when storing firearms and ammunition in your home.  [x] Always store your firearm unloaded.  [x] Use a firearms safety device AND store the firearm in a locked container.  [x] Store the ammunition separately in a locked container.  Always storing your firearm securely is the best method of childproofing your firearm; however, your choice of a storage place can add another element of safety. Carefully choose the storage place in your home especially if children may be around.  [x] Do not store your firearm where it is visible.  [x] Do not store your firearm in a bedside table, under your mattress or pillow, or on a closet shelf.  [x] Do not store your firearm among your valuables (such as jewelry or cameras) unless it is locked in a secure  container.  [x] Consider storing firearms not possessed for self-defense in a safe and secure manner away from the home.    [employer] for Gun Safety:  https://www.everytown.org       Gun Violence: Prediction, Prevention and Policy  American Psychological Association Panel of Experts Report  https://www.apa.org/pubs/reports/gun-violence-report.pdf     If you require further information pertaining to any of the issues outlined above, please reach out to your providers through the [facility] portal at [email or web address], or call [phone] to discuss.  See resource list for additional material.      IN CASE OF SUICIDAL THINKING, call the National Suicide Hotline Number: 988    988 Suicide & Crisis Lifeline: 602 , 0-951-969-UANJ (4915)  Provides 24/7, free and confidential support for people in distress, prevention and crisis resources for you or your loved ones, and best practices for professionals.    Call, text or chat.  https://988Payoffline.org

## 2023-06-09 NOTE — PROGRESS NOTES
[facility] [address] Medicine  Telemedicine Progress Note    Patient Name: Nalini Varma  MRN: [id number]  Patient Class: IP- Inpatient   Admission Date: 6/4/2023  Length of Stay: 4 days  Attending Physician: Khadar Gallardo MD  Primary Care Provider: Yane Sahni MD          Subjective:     Principal Problem:Debility        HPI:  The patient is a 72 y.o. female with a past medical history of Chronic obstructive pulmonary disease, Hyperlipidemia, Iron deficiency anemia, and Stage III CKD who presents for evaluation of bilateral leg pain. She reports she has been wheelchair bound for a year and states that she has had increased difficulty caring for herself over the last week. She reports she has been unable to transfer herself to her bed or to the toilet so she has been using a diaper. She called EMS today after she slid out of her wheelchair and was unable to get back into it without assistance. The patient reports she has been taking hydrocodone for the pain.  On initial workup, the patient is noted to have a low Na of 128 and appears disheveled.  The patient states she came to the hospital because she can't take care of herself anymore and would like placement for assistance.      Overview/Hospital Course:  No notes on file    Interval History: no complaints this morning. She is pending SNF placement. Medically ready for discharge     Review of Systems   Constitutional:  Negative for chills, fatigue and fever.   HENT: Negative.     Eyes: Negative.    Respiratory:  Negative for cough and shortness of breath.    Cardiovascular:  Negative for chest pain, palpitations and leg swelling.   Gastrointestinal:  Negative for abdominal pain and vomiting.   Genitourinary: Negative.    Skin: Negative.    Neurological:  Negative for seizures and speech difficulty.   Psychiatric/Behavioral:  Negative for agitation and confusion. The patient is not nervous/anxious.    Objective:     Vital Signs  (Most Recent):  Temp: 97.7 °F (36.5 °C) (06/09/23 0601)  Pulse: 83 (06/09/23 0601)  Resp: 18 (06/09/23 0614)  BP: (!) 173/79 (06/09/23 0601)  SpO2: 95 % (06/09/23 0601) Vital Signs (24h Range):  Temp:  [96.1 °F (35.6 °C)-97.8 °F (36.6 °C)] 97.7 °F (36.5 °C)  Pulse:  [72-87] 83  Resp:  [14-20] 18  SpO2:  [95 %-97 %] 95 %  BP: (137-173)/(65-79) 173/79     Weight: 88.5 kg (195 lb 1.7 oz)  Body mass index is 31.51 kg/m².    Intake/Output Summary (Last 24 hours) at 6/9/2023 0859  Last data filed at 6/9/2023 0618  Gross per 24 hour   Intake 600 ml   Output 1200 ml   Net -600 ml           Physical Exam  Vitals and nursing note reviewed.   Constitutional:       General: She is awake. She is not in acute distress.     Appearance: Normal appearance. She is well-developed and well-groomed. She is not ill-appearing, toxic-appearing or diaphoretic.   HENT:      Head: Normocephalic and atraumatic.   Eyes:      General: No scleral icterus.  Cardiovascular:      Rate and Rhythm: Normal rate.   Pulmonary:      Effort: No tachypnea or respiratory distress.   Musculoskeletal:      Right lower leg: No edema.      Left lower leg: No edema.   Skin:     Coloration: Skin is not jaundiced.   Neurological:      General: No focal deficit present.      Mental Status: She is alert and oriented to person, place, and time. Mental status is at baseline.   Psychiatric:         Attention and Perception: Attention normal.         Mood and Affect: Mood and affect normal.         Speech: Speech normal.         Behavior: Behavior normal. Behavior is cooperative.         Thought Content: Thought content normal.         Cognition and Memory: Cognition and memory normal. Cognition is not impaired. Memory is not impaired.         Judgment: Judgment normal.           Significant Labs: All pertinent labs within the past 24 hours have been reviewed.    Significant Imaging: I have reviewed all pertinent imaging results/findings within the past 24  hours.      Assessment/Plan:      * Debility  Patient with increasing weakness, appearance of inability to care for herself.  Reports becoming bedbound and now inability to transfer in and out of her wheelchair.    Needs SNF placement and likely would benefit from half-way NH but she is not sure she's ready to make that step. Has 1 friend she relies on to essentially do most of her care that is not simple ADLs.    Ok for discharge to SNF    Acute cystitis without hematuria  Temp Readings from Last 3 Encounters:   06/09/23 97.7 °F (36.5 °C) (Oral)   03/13/23 98.1 °F (36.7 °C)   03/06/23 98.3 °F (36.8 °C) (Oral)     Lab Results   Component Value Date    WBC 6.71 06/07/2023     No results for input(s): LACTATE in the last 72 hours.    Has completed course of IV Rocephin    Cultures were taken-   Microbiology Results (last 7 days)     Procedure Component Value Units Date/Time    Urine culture [[id number]]  (Abnormal)  (Susceptibility) Collected: 06/05/23 0216    Order Status: Completed Specimen: Urine Updated: 06/07/23 1926     Urine Culture, Routine KLEBSIELLA PNEUMONIAE  >100,000 cfu/ml  No other significant isolate      Narrative:      Specimen Source->Urine          Somnolence  resolved    Xanax, gabapentin, norco, trazodone and promethazine on her home med list. Don't think this polypharmacy is helping her to improve her functional status if that is truly her goal. She has been off these meds since arrival without complaint as of yet.    Syndrome of inappropriate secretion of antidiuretic hormone  History of SIADH, monitor. May need to fluid restrict her some and encourage more solid PO intake as she has decreased her oral intake in the past few months      CKD (chronic kidney disease) stage 3, GFR 30-59 ml/min  Creatinine 1, at baseline    Monitor for acute decompensation      Chronic diastolic (congestive) heart failure  Patient is identified as having Diastolic (HFpEF) heart failure that is Chronic. CHF is  currently controlled. Latest ECHO performed and demonstrates- Results for orders placed during the hospital encounter of 02/13/23    Echo    Interpretation Summary  · The left ventricle is normal in size with concentric remodeling and normal systolic function.  · Mild left atrial enlargement.  · The estimated ejection fraction is 60%.  · Grade III left ventricular diastolic dysfunction.  · Normal right ventricular size with normal right ventricular systolic function.  · There is mild aortic valve stenosis.  · Aortic valve area is 1.50 cm2; peak velocity is 3.06 m/s; mean gradient is 22 mmHg.  · Mild tricuspid regurgitation.  . Continue Beta Blocker and ACE/ARB and monitor clinical status closely. Monitor on telemetry. Patient is off CHF pathway.  Monitor strict Is&Os and daily weights.  Place on fluid restriction of 1.5 L.  on diet for CHF. Last BNP reviewed- and noted below   Recent Labs   Lab 06/05/23 0040   *   .  Appears near baseline.  Monitor for acute decompensation    Hyperlipidemia  Continue Lipitor      Tobacco dependence        Essential hypertension  Normotensive currently    Continue cardizem, toprol, torsemide      Chronic obstructive pulmonary disease  Patient appears well compensated.    Continue Breo as interchange  Duonebs PRN  Stop O2 if we can on her. spO2 92-94% goal        VTE Risk Mitigation (From admission, onward)         Ordered     heparin (porcine) injection 5,000 Units  Every 8 hours         06/05/23 0216     IP VTE HIGH RISK PATIENT  Once         06/05/23 0216     Place sequential compression device  Until discontinued         06/05/23 0216                      I have completed this tele-visit with the assistance of a telepresenter.    The attending portion of this evaluation, treatment, and documentation was performed per Khadar Loomis MD via Telemedicine AudioVisual using the secure [employer] software platform with 2 way audio/video. The provider was located off-site and  the patient is located in the hospital. The aforementioned video software was utilized to document the relevant history and physical exam    Khadar Loomis MD  Department of Hospital Medicine   [facility] - Med [facility]urg ([address])

## 2023-06-09 NOTE — PLAN OF CARE
ED to Hosp-Admission (Current) from 6/4/2023 in [facility]urg ([address])    6/7/2023 6/9/2023    [phone]   Medicare Message     Important Message from Medicare regarding Discharge Appeal Rights Given to patient/caregiver; Explained to patient/caregiver; Signed/date by patient/caregiver Given to patient/caregiver; Explained to patient/caregiver; Signed/date by patient/caregiver   Date IMM was signed 6/7/2023 6/9/2023   Time IMM was signed [phone]

## 2023-06-09 NOTE — PT/OT/SLP PROGRESS
Occupational Therapy      Patient Name:  Nalini Varma   MRN:  [id number]    Patient not seen today secondary to nausea though already taken medication for it. Patient stated she still feel bad.  Patient educated on importance of Occupational therapy to improve activity tolerance for increased endurance and independence with ADLs. Patient verbally understood but decline therapy.  Will follow-up next treatment day.    6/9/2023

## 2023-06-09 NOTE — SUBJECTIVE & OBJECTIVE
Interval History: no complaints this morning. She is pending SNF placement. Medically ready for discharge     Review of Systems   Constitutional:  Negative for chills, fatigue and fever.   HENT: Negative.     Eyes: Negative.    Respiratory:  Negative for cough and shortness of breath.    Cardiovascular:  Negative for chest pain, palpitations and leg swelling.   Gastrointestinal:  Negative for abdominal pain and vomiting.   Genitourinary: Negative.    Skin: Negative.    Neurological:  Negative for seizures and speech difficulty.   Psychiatric/Behavioral:  Negative for agitation and confusion. The patient is not nervous/anxious.    Objective:     Vital Signs (Most Recent):  Temp: 97.7 °F (36.5 °C) (06/09/23 0601)  Pulse: 83 (06/09/23 0601)  Resp: 18 (06/09/23 0614)  BP: (!) 173/79 (06/09/23 0601)  SpO2: 95 % (06/09/23 0601) Vital Signs (24h Range):  Temp:  [96.1 °F (35.6 °C)-97.8 °F (36.6 °C)] 97.7 °F (36.5 °C)  Pulse:  [72-87] 83  Resp:  [14-20] 18  SpO2:  [95 %-97 %] 95 %  BP: (137-173)/(65-79) 173/79     Weight: 88.5 kg (195 lb 1.7 oz)  Body mass index is 31.51 kg/m².    Intake/Output Summary (Last 24 hours) at 6/9/2023 0859  Last data filed at 6/9/2023 0618  Gross per 24 hour   Intake 600 ml   Output 1200 ml   Net -600 ml           Physical Exam  Vitals and nursing note reviewed.   Constitutional:       General: She is awake. She is not in acute distress.     Appearance: Normal appearance. She is well-developed and well-groomed. She is not ill-appearing, toxic-appearing or diaphoretic.   HENT:      Head: Normocephalic and atraumatic.   Eyes:      General: No scleral icterus.  Cardiovascular:      Rate and Rhythm: Normal rate.   Pulmonary:      Effort: No tachypnea or respiratory distress.   Musculoskeletal:      Right lower leg: No edema.      Left lower leg: No edema.   Skin:     Coloration: Skin is not jaundiced.   Neurological:      General: No focal deficit present.      Mental Status: She is alert and  oriented to person, place, and time. Mental status is at baseline.   Psychiatric:         Attention and Perception: Attention normal.         Mood and Affect: Mood and affect normal.         Speech: Speech normal.         Behavior: Behavior normal. Behavior is cooperative.         Thought Content: Thought content normal.         Cognition and Memory: Cognition and memory normal. Cognition is not impaired. Memory is not impaired.         Judgment: Judgment normal.           Significant Labs: All pertinent labs within the past 24 hours have been reviewed.    Significant Imaging: I have reviewed all pertinent imaging results/findings within the past 24 hours.

## 2023-06-09 NOTE — PT/OT/SLP PROGRESS
Physical Therapy Treatment    Patient Name:  Nalini Varma   MRN:  [id number]    Recommendations:     Discharge Recommendations: nursing facility, skilled (with transition to NH)  Discharge Equipment Recommendations: none  Barriers to discharge: Decreased caregiver support and functional mobility impairments, medical treatment    Assessment:     Nalini Varma is a 72 y.o. female admitted with a medical diagnosis of Debility.  She presents with the following impairments/functional limitations: weakness, pain, gait instability, impaired balance, impaired endurance, impaired functional mobility, impaired self care skills, decreased coordination, decreased lower extremity function, decreased ROM, decreased upper extremity function, decreased safety awareness.    No functional mobility performed. Pt unable to participate at this time 2/2 nausea and feeling generally unwell.   Pt educated on importance of goal-setting and the role of PT in helping to progress pt toward goals. Also discussed was safety in the home and other options for post-acute placement.    Rehab Prognosis: Good; patient would benefit from acute skilled PT services to address these deficits and reach maximum level of function.    Recent Surgery: * No surgery found *      Plan:     During this hospitalization, patient to be seen 4 x/week to address the identified rehab impairments via gait training, therapeutic activities, therapeutic exercises, neuromuscular re-education and progress toward the following goals:    Plan of Care Expires:  07/06/23    Subjective     Chief Complaint: nausea  Patient/Family Comments/goals: I just don't feel well, I can't do this now.  Pain/Comfort:  Pain Rating 1: other (see comments) (pt not rating or reporting pain at this time)  Pain Rating Post-Intervention 1: other (see comments) (pain not rated or reported)      Objective:     Communicated with nurse Felicia prior to session.  Patient found HOB elevated with bed  alarm, oxygen, peripheral IV, pressure relief boots, PureWick upon PT entry to room.     General Precautions: Standard, fall  Orthopedic Precautions: N/A  Braces: N/A  Respiratory Status: Nasal cannula, flow 2.5 L/min     Functional Mobility:  No functional mobility performed      AM-PAC 6 CLICK MOBILITY  Turning over in bed (including adjusting bedclothes, sheets and blankets)?: 2  Sitting down on and standing up from a chair with arms (e.g., wheelchair, bedside commode, etc.): 2  Moving from lying on back to sitting on the side of the bed?: 2  Moving to and from a bed to a chair (including a wheelchair)?: 2  Need to walk in hospital room?: 1  Climbing 3-5 steps with a railing?: 1  Basic Mobility Total Score: 10       Treatment & Education:  Pt educated on importance of goal-setting and the role of PT in helping to progress pt toward goals. Also discussed was safety in the home and other options for post-acute placement.    Patient left HOB elevated with all lines intact, call button in reach, bed alarm on, and nurse Felicia notified..    GOALS:   Multidisciplinary Problems       Physical Therapy Goals          Problem: Physical Therapy    Goal Priority Disciplines Outcome Goal Variances Interventions   Physical Therapy Goal     PT, PT/OT Ongoing, Progressing     Description: Goals to be met by: 7/6/23    Patient will perform the following to increase strength, improve mobility, and return to prior level of function:    1. Supine <> sit with CGA.  2. Stand pivot transfer from bed <> WC with CGA with least restrictive assistive device.  3. Wheelchair mobility x 50 ft with CGA using upper and/or lower extremities.                           Time Tracking:     PT Received On: 06/09/23  PT Start Time: 1043     PT Stop Time: 1054  PT Total Time (min): 11 min     Billable Minutes: Therapeutic Activity 11    Treatment Type: Treatment  PT/PTA: PTA     Number of PTA visits since last PT visit: 3     06/09/2023

## 2023-06-10 PROCEDURE — 25000003 PHARM REV CODE 250: Performed by: INTERNAL MEDICINE

## 2023-06-10 PROCEDURE — 30200315 PPD INTRADERMAL TEST REV CODE 302: Performed by: HOSPITALIST

## 2023-06-10 PROCEDURE — 27000221 HC OXYGEN, UP TO 24 HOURS

## 2023-06-10 PROCEDURE — 99900035 HC TECH TIME PER 15 MIN (STAT)

## 2023-06-10 PROCEDURE — 86580 TB INTRADERMAL TEST: CPT | Performed by: HOSPITALIST

## 2023-06-10 PROCEDURE — 94640 AIRWAY INHALATION TREATMENT: CPT

## 2023-06-10 PROCEDURE — 25000003 PHARM REV CODE 250: Performed by: NURSE PRACTITIONER

## 2023-06-10 PROCEDURE — 63600175 PHARM REV CODE 636 W HCPCS: Performed by: NURSE PRACTITIONER

## 2023-06-10 PROCEDURE — 11000001 HC ACUTE MED/SURG PRIVATE ROOM

## 2023-06-10 PROCEDURE — 25000242 PHARM REV CODE 250 ALT 637 W/ HCPCS: Performed by: NURSE PRACTITIONER

## 2023-06-10 PROCEDURE — 94761 N-INVAS EAR/PLS OXIMETRY MLT: CPT

## 2023-06-10 PROCEDURE — 99232 PR SUBSEQUENT HOSPITAL CARE,LEVL II: ICD-10-PCS | Mod: 95,,, | Performed by: HOSPITALIST

## 2023-06-10 PROCEDURE — 99232 SBSQ HOSP IP/OBS MODERATE 35: CPT | Mod: 95,,, | Performed by: HOSPITALIST

## 2023-06-10 RX ADMIN — HEPARIN SODIUM 5000 UNITS: 5000 INJECTION INTRAVENOUS; SUBCUTANEOUS at 09:06

## 2023-06-10 RX ADMIN — Medication 400 MG: at 08:06

## 2023-06-10 RX ADMIN — THERA TABS 1 TABLET: TAB at 08:06

## 2023-06-10 RX ADMIN — FLUTICASONE FUROATE AND VILANTEROL TRIFENATATE 1 PUFF: 100; 25 POWDER RESPIRATORY (INHALATION) at 09:06

## 2023-06-10 RX ADMIN — HEPARIN SODIUM 5000 UNITS: 5000 INJECTION INTRAVENOUS; SUBCUTANEOUS at 02:06

## 2023-06-10 RX ADMIN — ONDANSETRON 4 MG: 2 INJECTION INTRAMUSCULAR; INTRAVENOUS at 08:06

## 2023-06-10 RX ADMIN — DILTIAZEM HYDROCHLORIDE 180 MG: 180 CAPSULE, COATED, EXTENDED RELEASE ORAL at 08:06

## 2023-06-10 RX ADMIN — DULOXETINE 60 MG: 30 CAPSULE, DELAYED RELEASE ORAL at 08:06

## 2023-06-10 RX ADMIN — HYDROCODONE BITARTRATE AND ACETAMINOPHEN 1 TABLET: 5; 325 TABLET ORAL at 02:06

## 2023-06-10 RX ADMIN — Medication 1000 UNITS: at 08:06

## 2023-06-10 RX ADMIN — METOPROLOL SUCCINATE 50 MG: 50 TABLET, EXTENDED RELEASE ORAL at 08:06

## 2023-06-10 RX ADMIN — TORSEMIDE 20 MG: 20 TABLET ORAL at 08:06

## 2023-06-10 RX ADMIN — TUBERCULIN PURIFIED PROTEIN DERIVATIVE 5 UNITS: 5 INJECTION, SOLUTION INTRADERMAL at 10:06

## 2023-06-10 RX ADMIN — HEPARIN SODIUM 5000 UNITS: 5000 INJECTION INTRAVENOUS; SUBCUTANEOUS at 05:06

## 2023-06-10 RX ADMIN — IPRATROPIUM BROMIDE AND ALBUTEROL SULFATE 3 ML: .5; 3 SOLUTION RESPIRATORY (INHALATION) at 09:06

## 2023-06-10 RX ADMIN — CYANOCOBALAMIN TAB 1000 MCG 1000 MCG: 1000 TAB at 08:06

## 2023-06-10 RX ADMIN — HYDROCODONE BITARTRATE AND ACETAMINOPHEN 1 TABLET: 5; 325 TABLET ORAL at 08:06

## 2023-06-10 RX ADMIN — FERROUS SULFATE TAB 325 MG (65 MG ELEMENTAL FE) 1 EACH: 325 (65 FE) TAB at 08:06

## 2023-06-10 RX ADMIN — ATORVASTATIN CALCIUM 40 MG: 20 TABLET, FILM COATED ORAL at 08:06

## 2023-06-10 RX ADMIN — MICONAZOLE NITRATE: 20 OINTMENT TOPICAL at 08:06

## 2023-06-10 RX ADMIN — Medication 250 MG: at 08:06

## 2023-06-10 NOTE — PLAN OF CARE
"Met with patient at bedside to discuss accepting SNFs ([facility], [facility], & [facility]) - patient states "I think i'd prefer [facility] but need to know more about the facility" - explained process of SNF at length with patient - no local family to assist with process - patient denies any active bank accounts - her SSI is placed directly on a [employer] card & she has access to account online - [facility] [email or web address] requesting informational visit/call Monday once admission coordinator returns - order placed for PPD - request faxed to PH[facility] to initiate review for SNF auth   "

## 2023-06-10 NOTE — PROGRESS NOTES
[facility] Surg [address] Medicine  Telemedicine Progress Note    Patient Name: Nalini Varma  MRN: [id number]  Patient Class: IP- Inpatient   Admission Date: 6/4/2023  Length of Stay: 5 days  Attending Physician: Khadar Gallardo MD  Primary Care Provider: Yane Sahni MD          Subjective:     Principal Problem:Debility        HPI:  The patient is a 72 y.o. female with a past medical history of Chronic obstructive pulmonary disease, Hyperlipidemia, Iron deficiency anemia, and Stage III CKD who presents for evaluation of bilateral leg pain. She reports she has been wheelchair bound for a year and states that she has had increased difficulty caring for herself over the last week. She reports she has been unable to transfer herself to her bed or to the toilet so she has been using a diaper. She called EMS today after she slid out of her wheelchair and was unable to get back into it without assistance. The patient reports she has been taking hydrocodone for the pain.  On initial workup, the patient is noted to have a low Na of 128 and appears disheveled.  The patient states she came to the hospital because she can't take care of herself anymore and would like placement for assistance.      Overview/Hospital Course:  No notes on file    Interval History: no complaints this morning. She is pending SNF placement. Medically ready for discharge     Review of Systems   Constitutional:  Negative for chills, fatigue and fever.   HENT: Negative.     Eyes: Negative.    Respiratory:  Negative for cough and shortness of breath.    Cardiovascular:  Negative for chest pain, palpitations and leg swelling.   Gastrointestinal:  Negative for abdominal pain and vomiting.   Genitourinary: Negative.    Skin: Negative.    Neurological:  Negative for seizures and speech difficulty.   Psychiatric/Behavioral:  Negative for agitation and confusion. The patient is not nervous/anxious.    Objective:     Vital Signs  (Most Recent):  Temp: 97.4 °F (36.3 °C) (06/10/23 0731)  Pulse: 93 (06/10/23 0908)  Resp: 18 (06/10/23 0908)  BP: (!) 164/80 (06/10/23 0731)  SpO2: (!) 93 % (06/10/23 0908) Vital Signs (24h Range):  Temp:  [97.4 °F (36.3 °C)-98 °F (36.7 °C)] 97.4 °F (36.3 °C)  Pulse:  [80-98] 93  Resp:  [16-20] 18  SpO2:  [92 %-96 %] 93 %  BP: (162-188)/(79-89) 164/80     Weight: 88.5 kg (195 lb 1.7 oz)  Body mass index is 31.51 kg/m².    Intake/Output Summary (Last 24 hours) at 6/10/2023 0916  Last data filed at 6/10/2023 0544  Gross per 24 hour   Intake 240 ml   Output 2150 ml   Net -1910 ml           Physical Exam  Vitals and nursing note reviewed.   Constitutional:       General: She is awake. She is not in acute distress.     Appearance: Normal appearance. She is well-developed and well-groomed. She is not ill-appearing, toxic-appearing or diaphoretic.   HENT:      Head: Normocephalic and atraumatic.   Eyes:      General: No scleral icterus.  Cardiovascular:      Rate and Rhythm: Normal rate.   Pulmonary:      Effort: No tachypnea or respiratory distress.   Musculoskeletal:      Right lower leg: No edema.      Left lower leg: No edema.   Skin:     Coloration: Skin is not jaundiced.   Neurological:      General: No focal deficit present.      Mental Status: She is alert and oriented to person, place, and time. Mental status is at baseline.   Psychiatric:         Attention and Perception: Attention normal.         Mood and Affect: Mood and affect normal.         Speech: Speech normal.         Behavior: Behavior normal. Behavior is cooperative.         Thought Content: Thought content normal.         Cognition and Memory: Cognition and memory normal. Cognition is not impaired. Memory is not impaired.         Judgment: Judgment normal.           Significant Labs: All pertinent labs within the past 24 hours have been reviewed.    Significant Imaging: I have reviewed all pertinent imaging results/findings within the past 24  hours.      Assessment/Plan:      * Debility  Patient with increasing weakness, appearance of inability to care for herself.  Reports becoming bedbound and now inability to transfer in and out of her wheelchair.    Needs SNF placement and likely would benefit from assisted NH but she is not sure she's ready to make that step. Has 1 friend she relies on to essentially do most of her care that is not simple ADLs.    Ok for discharge to SNF    Acute cystitis without hematuria  Temp Readings from Last 3 Encounters:   06/09/23 97.7 °F (36.5 °C) (Oral)   03/13/23 98.1 °F (36.7 °C)   03/06/23 98.3 °F (36.8 °C) (Oral)     Lab Results   Component Value Date    WBC 6.71 06/07/2023     No results for input(s): LACTATE in the last 72 hours.    Has completed course of IV Rocephin    Cultures were taken-   Microbiology Results (last 7 days)     Procedure Component Value Units Date/Time    Urine culture [[id number]]  (Abnormal)  (Susceptibility) Collected: 06/05/23 0216    Order Status: Completed Specimen: Urine Updated: 06/07/23 1926     Urine Culture, Routine KLEBSIELLA PNEUMONIAE  >100,000 cfu/ml  No other significant isolate      Narrative:      Specimen Source->Urine          Somnolence  resolved    Xanax, gabapentin, norco, trazodone and promethazine on her home med list. Don't think this polypharmacy is helping her to improve her functional status if that is truly her goal. She has been off these meds since arrival without complaint as of yet.    Syndrome of inappropriate secretion of antidiuretic hormone  History of SIADH, monitor. May need to fluid restrict her some and encourage more solid PO intake as she has decreased her oral intake in the past few months      CKD (chronic kidney disease) stage 3, GFR 30-59 ml/min  Creatinine 1, at baseline    Monitor for acute decompensation      Chronic diastolic (congestive) heart failure  Patient is identified as having Diastolic (HFpEF) heart failure that is Chronic. CHF is  currently controlled. Latest ECHO performed and demonstrates- Results for orders placed during the hospital encounter of 02/13/23    Echo    Interpretation Summary  · The left ventricle is normal in size with concentric remodeling and normal systolic function.  · Mild left atrial enlargement.  · The estimated ejection fraction is 60%.  · Grade III left ventricular diastolic dysfunction.  · Normal right ventricular size with normal right ventricular systolic function.  · There is mild aortic valve stenosis.  · Aortic valve area is 1.50 cm2; peak velocity is 3.06 m/s; mean gradient is 22 mmHg.  · Mild tricuspid regurgitation.  . Continue Beta Blocker and ACE/ARB and monitor clinical status closely. Monitor on telemetry. Patient is off CHF pathway.  Monitor strict Is&Os and daily weights.  Place on fluid restriction of 1.5 L.  on diet for CHF. Last BNP reviewed- and noted below   Recent Labs   Lab 06/05/23 0040   *   .  Appears near baseline.  Monitor for acute decompensation    Hyperlipidemia  Continue Lipitor      Tobacco dependence        Essential hypertension  Normotensive currently    Continue cardizem, toprol, torsemide      Chronic obstructive pulmonary disease  Patient appears well compensated.    Continue Breo as interchange  Duonebs PRN  Stop O2 if we can on her. spO2 92-94% goal        VTE Risk Mitigation (From admission, onward)         Ordered     heparin (porcine) injection 5,000 Units  Every 8 hours         06/05/23 0216     IP VTE HIGH RISK PATIENT  Once         06/05/23 0216     Place sequential compression device  Until discontinued         06/05/23 0216                      I have completed this tele-visit with the assistance of a telepresenter.    The attending portion of this evaluation, treatment, and documentation was performed per Khadar Loomis MD via Telemedicine AudioVisual using the secure [employer] software platform with 2 way audio/video. The provider was located off-site and  the patient is located in the hospital. The aforementioned video software was utilized to document the relevant history and physical exam    Khadar Loomis MD  Department of Hospital Medicine   [facility] ([address])

## 2023-06-10 NOTE — SUBJECTIVE & OBJECTIVE
Interval History: no complaints this morning. She is pending SNF placement. Medically ready for discharge     Review of Systems   Constitutional:  Negative for chills, fatigue and fever.   HENT: Negative.     Eyes: Negative.    Respiratory:  Negative for cough and shortness of breath.    Cardiovascular:  Negative for chest pain, palpitations and leg swelling.   Gastrointestinal:  Negative for abdominal pain and vomiting.   Genitourinary: Negative.    Skin: Negative.    Neurological:  Negative for seizures and speech difficulty.   Psychiatric/Behavioral:  Negative for agitation and confusion. The patient is not nervous/anxious.    Objective:     Vital Signs (Most Recent):  Temp: 97.4 °F (36.3 °C) (06/10/23 0731)  Pulse: 93 (06/10/23 0908)  Resp: 18 (06/10/23 0908)  BP: (!) 164/80 (06/10/23 0731)  SpO2: (!) 93 % (06/10/23 0908) Vital Signs (24h Range):  Temp:  [97.4 °F (36.3 °C)-98 °F (36.7 °C)] 97.4 °F (36.3 °C)  Pulse:  [80-98] 93  Resp:  [16-20] 18  SpO2:  [92 %-96 %] 93 %  BP: (162-188)/(79-89) 164/80     Weight: 88.5 kg (195 lb 1.7 oz)  Body mass index is 31.51 kg/m².    Intake/Output Summary (Last 24 hours) at 6/10/2023 0916  Last data filed at 6/10/2023 0544  Gross per 24 hour   Intake 240 ml   Output 2150 ml   Net -1910 ml           Physical Exam  Vitals and nursing note reviewed.   Constitutional:       General: She is awake. She is not in acute distress.     Appearance: Normal appearance. She is well-developed and well-groomed. She is not ill-appearing, toxic-appearing or diaphoretic.   HENT:      Head: Normocephalic and atraumatic.   Eyes:      General: No scleral icterus.  Cardiovascular:      Rate and Rhythm: Normal rate.   Pulmonary:      Effort: No tachypnea or respiratory distress.   Musculoskeletal:      Right lower leg: No edema.      Left lower leg: No edema.   Skin:     Coloration: Skin is not jaundiced.   Neurological:      General: No focal deficit present.      Mental Status: She is alert and  oriented to person, place, and time. Mental status is at baseline.   Psychiatric:         Attention and Perception: Attention normal.         Mood and Affect: Mood and affect normal.         Speech: Speech normal.         Behavior: Behavior normal. Behavior is cooperative.         Thought Content: Thought content normal.         Cognition and Memory: Cognition and memory normal. Cognition is not impaired. Memory is not impaired.         Judgment: Judgment normal.           Significant Labs: All pertinent labs within the past 24 hours have been reviewed.    Significant Imaging: I have reviewed all pertinent imaging results/findings within the past 24 hours.

## 2023-06-10 NOTE — CHAPLAIN
06/10/23 1356   Clinical Encounter Type   Visit Type Initial Visit   Visit Category General Rounding   Visited With Health care provider;Patient not available   Length of Visit 15 Minutes   Patient Spiritual Encounters   Comments - Patient Ptx was meeting with HCP with more HCP outside the room waiting to go in.  will attempt a visit at another time.

## 2023-06-10 NOTE — NURSING
Shift Summary:  Patient is A&O, On 1 L NC, Denies any needs upon shift Change. Patient refused multiple times to be repositioned. Pt was given education on the importance of repositioning, pt still declined and stated understanding. Pt stated slight nausea and was given PRN Meds. No other acute changes noted. Report given to ANDERSON Gutierrez

## 2023-06-11 PROBLEM — J96.01 ACUTE HYPOXEMIC RESPIRATORY FAILURE: Status: ACTIVE | Noted: 2023-06-11

## 2023-06-11 LAB
ALBUMIN SERPL BCP-MCNC: 1.8 G/DL (ref 3.5–5.2)
ALLENS TEST: ABNORMAL
ALP SERPL-CCNC: 184 U/L (ref 55–135)
ALT SERPL W/O P-5'-P-CCNC: 23 U/L (ref 10–44)
ANION GAP SERPL CALC-SCNC: 12 MMOL/L (ref 8–16)
AST SERPL-CCNC: 47 U/L (ref 10–40)
BASOPHILS # BLD AUTO: 0.01 K/UL (ref 0–0.2)
BASOPHILS NFR BLD: 0.1 % (ref 0–1.9)
BILIRUB SERPL-MCNC: 0.5 MG/DL (ref 0.1–1)
BUN SERPL-MCNC: 10 MG/DL (ref 8–23)
CALCIUM SERPL-MCNC: 7.9 MG/DL (ref 8.7–10.5)
CHLORIDE SERPL-SCNC: 93 MMOL/L (ref 95–110)
CO2 SERPL-SCNC: 27 MMOL/L (ref 23–29)
CREAT SERPL-MCNC: 0.6 MG/DL (ref 0.5–1.4)
DIFFERENTIAL METHOD: ABNORMAL
EOSINOPHIL # BLD AUTO: 0 K/UL (ref 0–0.5)
EOSINOPHIL NFR BLD: 0 % (ref 0–8)
ERYTHROCYTE [DISTWIDTH] IN BLOOD BY AUTOMATED COUNT: 17.7 % (ref 11.5–14.5)
EST. GFR  (NO RACE VARIABLE): >60 ML/MIN/1.73 M^2
GLUCOSE SERPL-MCNC: 92 MG/DL (ref 70–110)
HCO3 UR-SCNC: 30.9 MMOL/L (ref 24–28)
HCT VFR BLD AUTO: 33 % (ref 37–48.5)
HGB BLD-MCNC: 11.4 G/DL (ref 12–16)
IMM GRANULOCYTES # BLD AUTO: 0.09 K/UL (ref 0–0.04)
IMM GRANULOCYTES NFR BLD AUTO: 0.8 % (ref 0–0.5)
LYMPHOCYTES # BLD AUTO: 1.1 K/UL (ref 1–4.8)
LYMPHOCYTES NFR BLD: 9.8 % (ref 18–48)
MCH RBC QN AUTO: 32 PG (ref 27–31)
MCHC RBC AUTO-ENTMCNC: 34.5 G/DL (ref 32–36)
MCV RBC AUTO: 93 FL (ref 82–98)
MONOCYTES # BLD AUTO: 1.1 K/UL (ref 0.3–1)
MONOCYTES NFR BLD: 9.8 % (ref 4–15)
NEUTROPHILS # BLD AUTO: 8.8 K/UL (ref 1.8–7.7)
NEUTROPHILS NFR BLD: 79.5 % (ref 38–73)
NRBC BLD-RTO: 0 /100 WBC
PCO2 BLDA: 37.4 MMHG (ref 35–45)
PH SMN: 7.53 [PH] (ref 7.35–7.45)
PLATELET # BLD AUTO: 279 K/UL (ref 150–450)
PMV BLD AUTO: 9.3 FL (ref 9.2–12.9)
PO2 BLDA: 51 MMHG (ref 80–100)
POC BE: 8 MMOL/L
POC SATURATED O2: 90 % (ref 95–100)
POC TCO2: 32 MMOL/L (ref 23–27)
POCT GLUCOSE: 108 MG/DL (ref 70–110)
POTASSIUM SERPL-SCNC: 4 MMOL/L (ref 3.5–5.1)
PROT SERPL-MCNC: 5.4 G/DL (ref 6–8.4)
RBC # BLD AUTO: 3.56 M/UL (ref 4–5.4)
SAMPLE: ABNORMAL
SITE: ABNORMAL
SODIUM SERPL-SCNC: 132 MMOL/L (ref 136–145)
WBC # BLD AUTO: 11.12 K/UL (ref 3.9–12.7)

## 2023-06-11 PROCEDURE — S4991 NICOTINE PATCH NONLEGEND: HCPCS | Performed by: INTERNAL MEDICINE

## 2023-06-11 PROCEDURE — 63600175 PHARM REV CODE 636 W HCPCS: Performed by: NURSE PRACTITIONER

## 2023-06-11 PROCEDURE — 36600 WITHDRAWAL OF ARTERIAL BLOOD: CPT

## 2023-06-11 PROCEDURE — 99291 PR CRITICAL CARE, E/M 30-74 MINUTES: ICD-10-PCS | Mod: ,,,

## 2023-06-11 PROCEDURE — 20000000 HC ICU ROOM

## 2023-06-11 PROCEDURE — 63600175 PHARM REV CODE 636 W HCPCS: Performed by: HOSPITALIST

## 2023-06-11 PROCEDURE — 27100171 HC OXYGEN HIGH FLOW UP TO 24 HOURS

## 2023-06-11 PROCEDURE — 25000242 PHARM REV CODE 250 ALT 637 W/ HCPCS: Performed by: NURSE PRACTITIONER

## 2023-06-11 PROCEDURE — 25000003 PHARM REV CODE 250: Performed by: NURSE PRACTITIONER

## 2023-06-11 PROCEDURE — 25000003 PHARM REV CODE 250: Performed by: INTERNAL MEDICINE

## 2023-06-11 PROCEDURE — 80053 COMPREHEN METABOLIC PANEL: CPT | Performed by: HOSPITALIST

## 2023-06-11 PROCEDURE — 25000242 PHARM REV CODE 250 ALT 637 W/ HCPCS: Performed by: INTERNAL MEDICINE

## 2023-06-11 PROCEDURE — 27000249 HC VAPOTHERM CIRCUIT

## 2023-06-11 PROCEDURE — 99900035 HC TECH TIME PER 15 MIN (STAT)

## 2023-06-11 PROCEDURE — 94761 N-INVAS EAR/PLS OXIMETRY MLT: CPT

## 2023-06-11 PROCEDURE — 94799 UNLISTED PULMONARY SVC/PX: CPT

## 2023-06-11 PROCEDURE — 82803 BLOOD GASES ANY COMBINATION: CPT

## 2023-06-11 PROCEDURE — 63600175 PHARM REV CODE 636 W HCPCS: Performed by: INTERNAL MEDICINE

## 2023-06-11 PROCEDURE — 99291 CRITICAL CARE FIRST HOUR: CPT | Mod: ,,,

## 2023-06-11 PROCEDURE — 94660 CPAP INITIATION&MGMT: CPT

## 2023-06-11 PROCEDURE — 27000221 HC OXYGEN, UP TO 24 HOURS

## 2023-06-11 PROCEDURE — 63600175 PHARM REV CODE 636 W HCPCS

## 2023-06-11 PROCEDURE — 85025 COMPLETE CBC W/AUTO DIFF WBC: CPT | Performed by: HOSPITALIST

## 2023-06-11 PROCEDURE — 94640 AIRWAY INHALATION TREATMENT: CPT

## 2023-06-11 PROCEDURE — 27000190 HC CPAP FULL FACE MASK W/VALVE

## 2023-06-11 PROCEDURE — 36415 COLL VENOUS BLD VENIPUNCTURE: CPT | Performed by: HOSPITALIST

## 2023-06-11 RX ORDER — HYDRALAZINE HYDROCHLORIDE 20 MG/ML
10 INJECTION INTRAMUSCULAR; INTRAVENOUS EVERY 6 HOURS PRN
Status: DISCONTINUED | OUTPATIENT
Start: 2023-06-11 | End: 2023-06-11

## 2023-06-11 RX ORDER — HYDRALAZINE HYDROCHLORIDE 20 MG/ML
20 INJECTION INTRAMUSCULAR; INTRAVENOUS EVERY 6 HOURS PRN
Status: DISCONTINUED | OUTPATIENT
Start: 2023-06-11 | End: 2023-06-15

## 2023-06-11 RX ORDER — ACETAMINOPHEN 500 MG
1000 TABLET ORAL EVERY 8 HOURS PRN
Status: DISCONTINUED | OUTPATIENT
Start: 2023-06-11 | End: 2023-06-16 | Stop reason: HOSPADM

## 2023-06-11 RX ORDER — HYDRALAZINE HYDROCHLORIDE 20 MG/ML
20 INJECTION INTRAMUSCULAR; INTRAVENOUS EVERY 6 HOURS PRN
Status: DISCONTINUED | OUTPATIENT
Start: 2023-06-11 | End: 2023-06-11

## 2023-06-11 RX ORDER — DEXMEDETOMIDINE HYDROCHLORIDE 4 UG/ML
0-1.4 INJECTION, SOLUTION INTRAVENOUS CONTINUOUS
Status: DISCONTINUED | OUTPATIENT
Start: 2023-06-11 | End: 2023-06-14

## 2023-06-11 RX ORDER — MORPHINE SULFATE 2 MG/ML
2 INJECTION, SOLUTION INTRAMUSCULAR; INTRAVENOUS EVERY 4 HOURS PRN
Status: DISCONTINUED | OUTPATIENT
Start: 2023-06-11 | End: 2023-06-11

## 2023-06-11 RX ORDER — DEXMEDETOMIDINE HYDROCHLORIDE 4 UG/ML
0-1.4 INJECTION, SOLUTION INTRAVENOUS CONTINUOUS
Status: DISCONTINUED | OUTPATIENT
Start: 2023-06-11 | End: 2023-06-11

## 2023-06-11 RX ORDER — MUPIROCIN 20 MG/G
OINTMENT TOPICAL 2 TIMES DAILY
Status: DISCONTINUED | OUTPATIENT
Start: 2023-06-11 | End: 2023-06-16

## 2023-06-11 RX ORDER — HALOPERIDOL 5 MG/ML
2 INJECTION INTRAMUSCULAR ONCE
Status: COMPLETED | OUTPATIENT
Start: 2023-06-11 | End: 2023-06-11

## 2023-06-11 RX ORDER — MORPHINE SULFATE 4 MG/ML
4 INJECTION, SOLUTION INTRAMUSCULAR; INTRAVENOUS EVERY 4 HOURS PRN
Status: COMPLETED | OUTPATIENT
Start: 2023-06-11 | End: 2023-06-12

## 2023-06-11 RX ORDER — BUSPIRONE HYDROCHLORIDE 10 MG/1
10 TABLET ORAL 2 TIMES DAILY
Status: DISCONTINUED | OUTPATIENT
Start: 2023-06-11 | End: 2023-06-15

## 2023-06-11 RX ORDER — NITROGLYCERIN 0.4 MG/1
0.4 TABLET SUBLINGUAL ONCE
Status: COMPLETED | OUTPATIENT
Start: 2023-06-11 | End: 2023-06-11

## 2023-06-11 RX ORDER — FUROSEMIDE 10 MG/ML
60 INJECTION INTRAMUSCULAR; INTRAVENOUS ONCE
Status: COMPLETED | OUTPATIENT
Start: 2023-06-11 | End: 2023-06-11

## 2023-06-11 RX ORDER — HYDROXYZINE HYDROCHLORIDE 25 MG/1
25 TABLET, FILM COATED ORAL 3 TIMES DAILY PRN
Status: DISCONTINUED | OUTPATIENT
Start: 2023-06-11 | End: 2023-06-16 | Stop reason: HOSPADM

## 2023-06-11 RX ORDER — IBUPROFEN 200 MG
1 TABLET ORAL DAILY
Status: COMPLETED | OUTPATIENT
Start: 2023-06-11 | End: 2023-06-14

## 2023-06-11 RX ORDER — FUROSEMIDE 10 MG/ML
40 INJECTION INTRAMUSCULAR; INTRAVENOUS ONCE
Status: COMPLETED | OUTPATIENT
Start: 2023-06-11 | End: 2023-06-11

## 2023-06-11 RX ORDER — NITROGLYCERIN 0.4 MG/1
TABLET SUBLINGUAL
Status: DISPENSED
Start: 2023-06-11 | End: 2023-06-12

## 2023-06-11 RX ORDER — MORPHINE SULFATE 2 MG/ML
2 INJECTION, SOLUTION INTRAMUSCULAR; INTRAVENOUS ONCE
Status: COMPLETED | OUTPATIENT
Start: 2023-06-11 | End: 2023-06-11

## 2023-06-11 RX ORDER — MORPHINE SULFATE 2 MG/ML
2 INJECTION, SOLUTION INTRAMUSCULAR; INTRAVENOUS ONCE AS NEEDED
Status: COMPLETED | OUTPATIENT
Start: 2023-06-11 | End: 2023-06-11

## 2023-06-11 RX ORDER — FUROSEMIDE 10 MG/ML
40 INJECTION INTRAMUSCULAR; INTRAVENOUS ONCE
Status: COMPLETED | OUTPATIENT
Start: 2023-06-11 | End: 2023-06-12

## 2023-06-11 RX ADMIN — Medication 400 MG: at 08:06

## 2023-06-11 RX ADMIN — Medication 250 MG: at 08:06

## 2023-06-11 RX ADMIN — DULOXETINE 60 MG: 30 CAPSULE, DELAYED RELEASE ORAL at 08:06

## 2023-06-11 RX ADMIN — FLUTICASONE FUROATE AND VILANTEROL TRIFENATATE 1 PUFF: 100; 25 POWDER RESPIRATORY (INHALATION) at 09:06

## 2023-06-11 RX ADMIN — HEPARIN SODIUM 5000 UNITS: 5000 INJECTION INTRAVENOUS; SUBCUTANEOUS at 06:06

## 2023-06-11 RX ADMIN — HEPARIN SODIUM 5000 UNITS: 5000 INJECTION INTRAVENOUS; SUBCUTANEOUS at 02:06

## 2023-06-11 RX ADMIN — Medication 1 PATCH: at 10:06

## 2023-06-11 RX ADMIN — FERROUS SULFATE TAB 325 MG (65 MG ELEMENTAL FE) 1 EACH: 325 (65 FE) TAB at 08:06

## 2023-06-11 RX ADMIN — IPRATROPIUM BROMIDE AND ALBUTEROL SULFATE 3 ML: .5; 3 SOLUTION RESPIRATORY (INHALATION) at 09:06

## 2023-06-11 RX ADMIN — IPRATROPIUM BROMIDE AND ALBUTEROL SULFATE 3 ML: .5; 3 SOLUTION RESPIRATORY (INHALATION) at 05:06

## 2023-06-11 RX ADMIN — CYANOCOBALAMIN TAB 1000 MCG 1000 MCG: 1000 TAB at 08:06

## 2023-06-11 RX ADMIN — DEXMEDETOMIDINE HYDROCHLORIDE 0.2 MCG/KG/HR: 4 INJECTION, SOLUTION INTRAVENOUS at 09:06

## 2023-06-11 RX ADMIN — MORPHINE SULFATE 4 MG: 4 INJECTION, SOLUTION INTRAMUSCULAR; INTRAVENOUS at 10:06

## 2023-06-11 RX ADMIN — NITROGLYCERIN 0.4 MG: 0.4 TABLET, ORALLY DISINTEGRATING SUBLINGUAL at 09:06

## 2023-06-11 RX ADMIN — HYDROCODONE BITARTRATE AND ACETAMINOPHEN 1 TABLET: 5; 325 TABLET ORAL at 12:06

## 2023-06-11 RX ADMIN — HYDRALAZINE HYDROCHLORIDE 10 MG: 20 INJECTION INTRAMUSCULAR; INTRAVENOUS at 12:06

## 2023-06-11 RX ADMIN — METOPROLOL SUCCINATE 50 MG: 50 TABLET, EXTENDED RELEASE ORAL at 09:06

## 2023-06-11 RX ADMIN — DEXMEDETOMIDINE HYDROCHLORIDE 1.4 MCG/KG/HR: 4 INJECTION, SOLUTION INTRAVENOUS at 10:06

## 2023-06-11 RX ADMIN — BUSPIRONE HYDROCHLORIDE 10 MG: 10 TABLET ORAL at 09:06

## 2023-06-11 RX ADMIN — MUPIROCIN: 20 OINTMENT TOPICAL at 08:06

## 2023-06-11 RX ADMIN — HALOPERIDOL LACTATE 2 MG: 5 INJECTION, SOLUTION INTRAMUSCULAR at 09:06

## 2023-06-11 RX ADMIN — MICONAZOLE NITRATE: 20 OINTMENT TOPICAL at 09:06

## 2023-06-11 RX ADMIN — Medication 1000 UNITS: at 08:06

## 2023-06-11 RX ADMIN — ATORVASTATIN CALCIUM 40 MG: 20 TABLET, FILM COATED ORAL at 08:06

## 2023-06-11 RX ADMIN — DILTIAZEM HYDROCHLORIDE 180 MG: 180 CAPSULE, COATED, EXTENDED RELEASE ORAL at 08:06

## 2023-06-11 RX ADMIN — HYDRALAZINE HYDROCHLORIDE 20 MG: 20 INJECTION INTRAMUSCULAR; INTRAVENOUS at 10:06

## 2023-06-11 RX ADMIN — MORPHINE SULFATE 2 MG: 2 INJECTION, SOLUTION INTRAMUSCULAR; INTRAVENOUS at 08:06

## 2023-06-11 RX ADMIN — TORSEMIDE 20 MG: 20 TABLET ORAL at 08:06

## 2023-06-11 RX ADMIN — HEPARIN SODIUM 5000 UNITS: 5000 INJECTION INTRAVENOUS; SUBCUTANEOUS at 09:06

## 2023-06-11 RX ADMIN — ONDANSETRON 4 MG: 2 INJECTION INTRAMUSCULAR; INTRAVENOUS at 05:06

## 2023-06-11 RX ADMIN — MORPHINE SULFATE 2 MG: 2 INJECTION, SOLUTION INTRAMUSCULAR; INTRAVENOUS at 05:06

## 2023-06-11 RX ADMIN — FUROSEMIDE 40 MG: 10 INJECTION, SOLUTION INTRAMUSCULAR; INTRAVENOUS at 02:06

## 2023-06-11 RX ADMIN — IPRATROPIUM BROMIDE AND ALBUTEROL SULFATE 3 ML: .5; 3 SOLUTION RESPIRATORY (INHALATION) at 10:06

## 2023-06-11 RX ADMIN — ONDANSETRON 4 MG: 2 INJECTION INTRAMUSCULAR; INTRAVENOUS at 12:06

## 2023-06-11 RX ADMIN — ACETAMINOPHEN 650 MG: 325 TABLET, FILM COATED ORAL at 06:06

## 2023-06-11 RX ADMIN — FUROSEMIDE 60 MG: 10 INJECTION, SOLUTION INTRAMUSCULAR; INTRAVENOUS at 08:06

## 2023-06-11 RX ADMIN — THERA TABS 1 TABLET: TAB at 08:06

## 2023-06-11 NOTE — SIGNIFICANT EVENT
Responded to rapid response called for acute hypoxemic respiratory distress. Patient was on venturi mask 40% (8L) this morning and desaturated to 50-60% this evening. Patient appeared cold and cyanotic but alert. Dr. Austin was present. Lung auscultation revealed diminished air entry to bilateral lungs, L>R. Given IV morphine 2mg x1 .Patient was placed on high flow 12L, which saw Pox improving to 83%. Patient reports feeling better, color returned to face. Given acute desaturation requiring Vapotherm support, to transfer to ICU for monitoring tonight. Care of patient transferred back to Dr. Ag from tele-medicine.    Critical care time spent on the evaluation and treatment of acute hypoxemic respiratory failure, review of pertinent labs and imaging studies, discussions with Dr. Austin and staff at bedside: 35 minutes.     Plan:  Vapotherm to keep Pox > 92%  IV furosemide 60mg x 1 at 2100hrs  Strict intake and output  ICU for monitoring overnight

## 2023-06-11 NOTE — CONSULTS
Pulm/CC Fellow Consult Note    Attending Physician: Khadar Gallardo MD    Date of Admit: 6/4/2023    Reason for Consult: Acute hypoxemic respiratory failure    History of Present Illness:  Nalini Varma is a 72 y.o.  female with a PMHx chronic pain, anxiety, COPD on 3 liters of home oxygen, HFpEF with G3DD (previous EF of 38%), HTN, CKD stage 3 and recurrent stage 1 lung adenocarcinoma who presented to ER on 6/4/23 with complaints of difficulty caring for herself. She was admitted with hyponatremia, hypertension, and placed on her home oxygen. On the morning of 6/11, the patient was noted to have some increased lethargy, desaturations in the low 80s, and increased work of breathing. She was placed on a Venti-Mask, given a breathing treatment, CXR and ABG were ordered. CXR showed increased white out of the L lung and ABG of 7.525/37/51 on 40% FiO2. Pulmonology was called for this acute worsening of her respiratory status this morning.     In the room, the patient has minimal complaints although does endorse that her breathing felt a little worse this morning. She has no new coughing, shortness of breath, or sputum production.     Past Medical History:  Past Medical History:   Diagnosis Date    Anxiety     Cervical spinal stenosis     Choledocholithiasis     Chronic obstructive pulmonary disease 03/16/2016    Degenerative disc disease of cervical spine     Diverticulosis 04/24/2018    History of alcohol abuse 03/02/2021    History of gastric ulcer     History of L3 compression fracture 12/19/2018    History of lung cancer     s/p completion of XRT in 10/2020    Hyperlipidemia     Iron deficiency anemia     Osteoarthritis     Stage III CKD 2017       Past Surgical History:  Past Surgical History:   Procedure Laterality Date    BREAST CYST EXCISION Right     1967    CATARACT EXTRACTION      COLONOSCOPY  2015    COLONOSCOPY N/A 6/8/2022    Procedure: COLONOSCOPY;  Surgeon: Helio Cheema MD;  Location: [facility]  (2ND FLR);  Service: Endoscopy;  Laterality: N/A;  5/25-Pt requesting Dr. Cheema-approval given per Dr. Cheema-ml  Fully vaccinated, prep instr portal -ml    CORONARY ANGIOGRAPHY N/A 7/20/2018    Procedure: ANGIOGRAM, CORONARY ARTERY;  Surgeon: Willard Roberts MD;  Location: [facility] CATH LAB;  Service: Cardiovascular;  Laterality: N/A;    ENDOSCOPIC ULTRASOUND OF UPPER GASTROINTESTINAL TRACT N/A 3/24/2021    Procedure: ULTRASOUND, UPPER GI TRACT, ENDOSCOPIC;  Surgeon: Helio Cheema MD;  Location: [facility] ENDO (2ND FLR);  Service: Endoscopy;  Laterality: N/A;    ENDOSCOPIC ULTRASOUND OF UPPER GASTROINTESTINAL TRACT N/A 4/25/2022    Procedure: ULTRASOUND, UPPER GI TRACT, ENDOSCOPIC;  Surgeon: Helio Cheema MD;  Location: [facility] ENDO (2ND FLR);  Service: Endoscopy;  Laterality: N/A;  cardiac risk assessment 1 per Dr. Ortez. see t/e 3/17-SC  3/25:new instructions via portal. home with medical transport?-SC    ERCP N/A 3/24/2021    Procedure: ERCP (ENDOSCOPIC RETROGRADE CHOLANGIOPANCREATOGRAPHY);  Surgeon: Helio Cheema MD;  Location: [facility] (2ND FLR);  Service: Endoscopy;  Laterality: N/A;    ERCP N/A 4/30/2021    Procedure: ERCP (ENDOSCOPIC RETROGRADE CHOLANGIOPANCREATOGRAPHY);  Surgeon: Boo Gray MD;  Location: [facility] ENDO (2ND FLR);  Service: Endoscopy;  Laterality: N/A;    ERCP N/A 7/1/2021    Procedure: ERCP (ENDOSCOPIC RETROGRADE CHOLANGIOPANCREATOGRAPHY);  Surgeon: Helio Cheema MD;  Location: [facility] ENDO (2ND FLR);  Service: Endoscopy;  Laterality: N/A;  Ok for Taxi. Dr Cheema  rapid covid 1130am- tb inst email    ESOPHAGOGASTRODUODENOSCOPY  2015    ESOPHAGOGASTRODUODENOSCOPY N/A 3/4/2021    Procedure: EGD (ESOPHAGOGASTRODUODENOSCOPY);  Surgeon: Yenifer Ramirez MD;  Location: [facility];  Service: Endoscopy;  Laterality: N/A;    ESOPHAGOGASTRODUODENOSCOPY N/A 3/24/2021    Procedure: EGD (ESOPHAGOGASTRODUODENOSCOPY);  Surgeon: Helio Cheema MD;  Location: [facility] ([address] [place] [address]);  Service:  Endoscopy;  Laterality: N/A;    EYE SURGERY  2016    Cataracts    FRACTURE SURGERY  2014    JOINT REPLACEMENT  2007    ORIF FEMUR FRACTURE Left     TOTAL KNEE ARTHROPLASTY Left 2007    TUBAL LIGATION         Allergies:  Review of patient's allergies indicates:   Allergen Reactions    Wellbutrin [bupropion hcl] Other (See Comments)     Hyponatremia and hypomagnesia     Zoloft [sertraline] Other (See Comments)     Hyponatremia and hypomagnesia     Bananas [banana]      Emesis, and stomach cramps    Patient states she is not allergic to Banana; she cannot eat one everyday       Family History:  Family History   Problem Relation Age of Onset    Hypertension Mother     Alzheimer's disease Mother     Dementia Mother     Depression Mother         Alzheimers    Myasthenia gravis Father     Arthritis Father         Myasthenia Gravis    Rectal cancer Father     Hypertension Brother     Arthritis Brother         Colon cancer, obese, high blood pressure    Colon cancer Brother     No Known Problems Son     Cancer Sister         Brain cancer    Miscarriages / Stillbirths Sister     Melanoma Neg Hx     Breast cancer Neg Hx        Social History:  Social History     Tobacco Use    Smoking status: Every Day     Packs/day: 1.00     Years: 53.00     Pack years: 53.00     Types: Cigarettes     Start date: 4/30/1967    Smokeless tobacco: Never    Tobacco comments:     I had quit for about 6 months this past year. Then massive stress and started back up sometimes   Substance Use Topics    Alcohol use: Yes     Alcohol/week: 7.0 standard drinks     Types: 7 Drinks containing 0.5 oz of alcohol per week     Comment: at least 1 cocktail a day- vodka    Drug use: No       Review of Systems:  Review of Systems   Respiratory:  Positive for shortness of breath and wheezing. Negative for cough and sputum production.    All other systems reviewed and are negative.     Objective:   Last 24 Hour Vital Signs:  BP  Min: 164/81  Max: 186/97  Temp  Avg:  98 °F (36.7 °C)  Min: 97.5 °F (36.4 °C)  Max: 98.4 °F (36.9 °C)  Pulse  Av.7  Min: 89  Max: 100  Resp  Av  Min: 15  Max: 22  SpO2  Av.6 %  Min: 89 %  Max: 93 %  Body mass index is 31.51 kg/m².  I/O last 3 completed shifts:  In: 200 [P.O.:200]  Out: 1050 [Urine:1050]    Physical Exam:  General: Alert and awake in NAD w/ venti-mask (yellow)  HENT:  NCAT; anicteric sclera  Cardio:  Tachycardia w/ regular rhythm, normal S1 and S2; no murmurs   Resp:  Coarse breath sounds on expiration; respirations unlabored; no wheezes  Abdom:  Soft, Non-tender  Extrem:  WWP with no clubbing, 2+ pitting edema in dependent areas  Pulses:  2+ and symmetric distally  Neuro:  AAOx3; cooperative; annoyed, difficulty hearing     Imaging:  CXR 23: new L sided chest white out compared to CXR on admission, no mediastinal shifting    Bedside US: Bilateral pleural effusion, mild, free flowing; multiple B lines throughout the L and R lung fields    Assessment & Plan:   71yo F w/ PMH CHF, recurrent lung adenocarcinoma, extensive smoking history on home oxygen with acute respiratory decompensation this morning.     CVS  #HFpEF, G3DD  #Uncontrolled HTN  Last BP within goal was on ; since then has been increasing up to . Currently on CCB, BB, and diuretic. Would consider increasing current dosing or adding another agent for better BP control along with increasing diuresis given her lung crackles, B-lines, pleural effusions and increased oxygen requirement.   - add IV lasix dosing for increased diuresis  - increase antihypertensive per primary team  - current regimen    - Diltiazem 180mg    - Toprol XL 50mg    - Torsemide 20mg    Pulm  #Acute hypoxemic respiratory failure  Patient with decreased O2 saturations with morning confirmed with ABG with pO2 in the 50s. She also has a new L sided opacification noted as well as RLL air space disease. Given her clinical picture of being fine for the last 7 days of hospitalization  and acutely decompensating this morning, we would favor an acute process. Her BP's have remained above 160s up to 188 for at least the last 48 hours; in a patient with G3DD, this may cause some worsening pulmonary edema causing decompensation. Her UOP was also noted to be down (half of what it was the previous 24 hours). We would recommend more aggressive diuresis given this picture for a goal of net negative 1-2L daily. She has settled back to only 4L of oxygen which is only 1 above her baseline. Would also continue with scheduled nebulizer treatments as well.  - recommend IV lasix treatment    - 8am dose not given    - goal net negative 1-2L per day for at least a few days  - scheduled albuterol treatments while awake  - no need for antibiotics or escalation of steroids at this time  - improved blood pressure control    Thank you for this consultation. Please contact me at [phone] for any further assistance.     Justin Ellerman, MD  Fellow  [facility]

## 2023-06-11 NOTE — NURSING
Arrival in ICU stable. AAOx4 with mild SOB talking clearly. Lungs clear but Decreased in bases. Abd is soft and round obese. 2 IV flushed and patent. General edema present. Given remote and instructions to keep medical devices on. Monitor in Tachy in 110/min range. Multiple skin Foam pad intact.

## 2023-06-11 NOTE — CARE UPDATE
06/11/23 0901   Patient Assessment/Suction   Level of Consciousness (AVPU) alert   Respiratory Effort Mild;Labored   All Lung Fields Breath Sounds wheezes, expiratory   Skin Integrity   $ Wound Care Tech Time 15 min   Area Observed Left;Right;Behind ear;Bridge of nose;Cheek;Nares   Skin Appearance without discoloration   PRE-TX-O2   Device (Oxygen Therapy) venturi mask   $ Is the patient on Low Flow Oxygen? Yes   Flow (L/min) 8   Oxygen Concentration (%) 40   SpO2 (!) 90 %   Pulse 97   Resp (!) 22   Aerosol Therapy   $ Aerosol Therapy Charges Aerosol Treatment   Daily Review of Necessity (SVN) completed   Respiratory Treatment Status (SVN) given   Treatment Route (SVN) mask   Patient Position (SVN) semi-Obrien's   Post Treatment Assessment (SVN) breath sounds unchanged   Signs of Intolerance (SVN) none   Inhaler   $ Inhaler Charges MDI (Metered Dose Inahler) Treatment;Mouth rinsed post treatment   Daily Review of Necessity (Inhaler) completed   Respiratory Treatment Status (Inhaler) given   Treatment Route (Inhaler) mask   Patient Position (Inhaler) semi-Obrien's   Post Treatment Assessment (Inhaler) breath sounds unchanged   Signs of Intolerance (Inhaler) none   Ready to Wean/Extubation Screen   FIO2<=50 (chart decimal) 0.4

## 2023-06-11 NOTE — ASSESSMENT & PLAN NOTE
Rapid response 6/11/23: Patient was on venturi mask 40% (8L) and desaturated to 50-60% this evening. Patient appeared cold and cyanotic but alert. Dr. Austin was present. Lung auscultation revealed diminished air entry to bilateral lungs, L>R. Given IV morphine 2mg x1 .Patient was placed on high flow 12L, which saw Pox improving to 83%. Patient reports feeling better, color returned to face. Given acute desaturation requiring Vapotherm support, to transfer to ICU for monitoring tonight.      Plan:  Whiteout left lung, afebrile without leukocytosis. H/O CHF and COPD and recurrent lung CA on recent chem/rads late 2022  Continue oxygen support per pulm  Continue IV lasix for diuresis  Will discuss with palliative  Repeat CXR

## 2023-06-12 PROBLEM — R45.1 AGITATION: Status: ACTIVE | Noted: 2023-06-12

## 2023-06-12 PROBLEM — R45.1 AGITATION: Status: RESOLVED | Noted: 2023-06-12 | Resolved: 2023-06-12

## 2023-06-12 LAB
ALLENS TEST: ABNORMAL
ANION GAP SERPL CALC-SCNC: 12 MMOL/L (ref 8–16)
BASOPHILS # BLD AUTO: 0.02 K/UL (ref 0–0.2)
BASOPHILS NFR BLD: 0.2 % (ref 0–1.9)
BUN SERPL-MCNC: 15 MG/DL (ref 8–23)
CALCIUM SERPL-MCNC: 7.7 MG/DL (ref 8.7–10.5)
CHLORIDE SERPL-SCNC: 95 MMOL/L (ref 95–110)
CO2 SERPL-SCNC: 26 MMOL/L (ref 23–29)
CREAT SERPL-MCNC: 0.7 MG/DL (ref 0.5–1.4)
DIFFERENTIAL METHOD: ABNORMAL
EOSINOPHIL # BLD AUTO: 0 K/UL (ref 0–0.5)
EOSINOPHIL NFR BLD: 0 % (ref 0–8)
ERYTHROCYTE [DISTWIDTH] IN BLOOD BY AUTOMATED COUNT: 18 % (ref 11.5–14.5)
EST. GFR  (NO RACE VARIABLE): >60 ML/MIN/1.73 M^2
GLUCOSE SERPL-MCNC: 99 MG/DL (ref 70–110)
HCO3 UR-SCNC: 31.3 MMOL/L (ref 24–28)
HCT VFR BLD AUTO: 29.6 % (ref 37–48.5)
HGB BLD-MCNC: 10.4 G/DL (ref 12–16)
IMM GRANULOCYTES # BLD AUTO: 0.15 K/UL (ref 0–0.04)
IMM GRANULOCYTES NFR BLD AUTO: 1.4 % (ref 0–0.5)
LYMPHOCYTES # BLD AUTO: 1.1 K/UL (ref 1–4.8)
LYMPHOCYTES NFR BLD: 10.2 % (ref 18–48)
MCH RBC QN AUTO: 31.8 PG (ref 27–31)
MCHC RBC AUTO-ENTMCNC: 35.1 G/DL (ref 32–36)
MCV RBC AUTO: 91 FL (ref 82–98)
MONOCYTES # BLD AUTO: 1.1 K/UL (ref 0.3–1)
MONOCYTES NFR BLD: 10.1 % (ref 4–15)
NEUTROPHILS # BLD AUTO: 8.5 K/UL (ref 1.8–7.7)
NEUTROPHILS NFR BLD: 78.1 % (ref 38–73)
NRBC BLD-RTO: 0 /100 WBC
PCO2 BLDA: 46 MMHG (ref 35–45)
PH SMN: 7.44 [PH] (ref 7.35–7.45)
PLATELET # BLD AUTO: 264 K/UL (ref 150–450)
PMV BLD AUTO: 9.5 FL (ref 9.2–12.9)
PO2 BLDA: 47 MMHG (ref 40–60)
POC BE: 7 MMOL/L
POC SATURATED O2: 83 % (ref 95–100)
POC TCO2: 33 MMOL/L (ref 24–29)
POTASSIUM SERPL-SCNC: 4.2 MMOL/L (ref 3.5–5.1)
RBC # BLD AUTO: 3.27 M/UL (ref 4–5.4)
SAMPLE: ABNORMAL
SITE: ABNORMAL
SODIUM SERPL-SCNC: 133 MMOL/L (ref 136–145)
TB INDURATION 48 - 72 HR READ: 0 MM
WBC # BLD AUTO: 10.82 K/UL (ref 3.9–12.7)

## 2023-06-12 PROCEDURE — 99233 PR SUBSEQUENT HOSPITAL CARE,LEVL III: ICD-10-PCS | Mod: ,,, | Performed by: STUDENT IN AN ORGANIZED HEALTH CARE EDUCATION/TRAINING PROGRAM

## 2023-06-12 PROCEDURE — 99900035 HC TECH TIME PER 15 MIN (STAT)

## 2023-06-12 PROCEDURE — 99233 PR SUBSEQUENT HOSPITAL CARE,LEVL III: ICD-10-PCS | Mod: ,,, | Performed by: INTERNAL MEDICINE

## 2023-06-12 PROCEDURE — 94640 AIRWAY INHALATION TREATMENT: CPT

## 2023-06-12 PROCEDURE — 25000003 PHARM REV CODE 250: Performed by: INTERNAL MEDICINE

## 2023-06-12 PROCEDURE — 25000242 PHARM REV CODE 250 ALT 637 W/ HCPCS: Performed by: NURSE PRACTITIONER

## 2023-06-12 PROCEDURE — 99497 PR ADVNCD CARE PLAN 30 MIN: ICD-10-PCS | Mod: ,,, | Performed by: STUDENT IN AN ORGANIZED HEALTH CARE EDUCATION/TRAINING PROGRAM

## 2023-06-12 PROCEDURE — 25000003 PHARM REV CODE 250: Performed by: HOSPITALIST

## 2023-06-12 PROCEDURE — 99497 ADVNCD CARE PLAN 30 MIN: CPT | Mod: ,,, | Performed by: STUDENT IN AN ORGANIZED HEALTH CARE EDUCATION/TRAINING PROGRAM

## 2023-06-12 PROCEDURE — 27100171 HC OXYGEN HIGH FLOW UP TO 24 HOURS

## 2023-06-12 PROCEDURE — 94667 MNPJ CHEST WALL 1ST: CPT

## 2023-06-12 PROCEDURE — 94799 UNLISTED PULMONARY SVC/PX: CPT

## 2023-06-12 PROCEDURE — 99233 SBSQ HOSP IP/OBS HIGH 50: CPT | Mod: ,,, | Performed by: INTERNAL MEDICINE

## 2023-06-12 PROCEDURE — 25000242 PHARM REV CODE 250 ALT 637 W/ HCPCS: Performed by: STUDENT IN AN ORGANIZED HEALTH CARE EDUCATION/TRAINING PROGRAM

## 2023-06-12 PROCEDURE — S4991 NICOTINE PATCH NONLEGEND: HCPCS | Performed by: INTERNAL MEDICINE

## 2023-06-12 PROCEDURE — 85025 COMPLETE CBC W/AUTO DIFF WBC: CPT | Performed by: INTERNAL MEDICINE

## 2023-06-12 PROCEDURE — 94761 N-INVAS EAR/PLS OXIMETRY MLT: CPT

## 2023-06-12 PROCEDURE — 63600175 PHARM REV CODE 636 W HCPCS: Performed by: HOSPITALIST

## 2023-06-12 PROCEDURE — 99233 SBSQ HOSP IP/OBS HIGH 50: CPT | Mod: ,,, | Performed by: STUDENT IN AN ORGANIZED HEALTH CARE EDUCATION/TRAINING PROGRAM

## 2023-06-12 PROCEDURE — 80048 BASIC METABOLIC PNL TOTAL CA: CPT | Performed by: INTERNAL MEDICINE

## 2023-06-12 PROCEDURE — 99233 PR SUBSEQUENT HOSPITAL CARE,LEVL III: ICD-10-PCS | Mod: GC,,, | Performed by: INTERNAL MEDICINE

## 2023-06-12 PROCEDURE — 99233 SBSQ HOSP IP/OBS HIGH 50: CPT | Mod: GC,,, | Performed by: INTERNAL MEDICINE

## 2023-06-12 PROCEDURE — 63600175 PHARM REV CODE 636 W HCPCS: Performed by: NURSE PRACTITIONER

## 2023-06-12 PROCEDURE — 63600175 PHARM REV CODE 636 W HCPCS: Performed by: INTERNAL MEDICINE

## 2023-06-12 PROCEDURE — 82803 BLOOD GASES ANY COMBINATION: CPT

## 2023-06-12 PROCEDURE — 51798 US URINE CAPACITY MEASURE: CPT

## 2023-06-12 PROCEDURE — 20000000 HC ICU ROOM

## 2023-06-12 PROCEDURE — 36415 COLL VENOUS BLD VENIPUNCTURE: CPT | Performed by: INTERNAL MEDICINE

## 2023-06-12 PROCEDURE — 25000003 PHARM REV CODE 250: Performed by: NURSE PRACTITIONER

## 2023-06-12 RX ORDER — LORAZEPAM 2 MG/ML
1 INJECTION INTRAMUSCULAR
Status: DISCONTINUED | OUTPATIENT
Start: 2023-06-12 | End: 2023-06-16 | Stop reason: HOSPADM

## 2023-06-12 RX ORDER — SODIUM CHLORIDE FOR INHALATION 3 %
4 VIAL, NEBULIZER (ML) INHALATION
Status: DISCONTINUED | OUTPATIENT
Start: 2023-06-12 | End: 2023-06-16 | Stop reason: HOSPADM

## 2023-06-12 RX ORDER — IPRATROPIUM BROMIDE AND ALBUTEROL SULFATE 2.5; .5 MG/3ML; MG/3ML
3 SOLUTION RESPIRATORY (INHALATION) EVERY 6 HOURS
Status: DISCONTINUED | OUTPATIENT
Start: 2023-06-12 | End: 2023-06-15

## 2023-06-12 RX ORDER — MORPHINE SULFATE 4 MG/ML
4 INJECTION, SOLUTION INTRAMUSCULAR; INTRAVENOUS EVERY 6 HOURS PRN
Status: DISCONTINUED | OUTPATIENT
Start: 2023-06-13 | End: 2023-06-13

## 2023-06-12 RX ADMIN — HEPARIN SODIUM 5000 UNITS: 5000 INJECTION INTRAVENOUS; SUBCUTANEOUS at 06:06

## 2023-06-12 RX ADMIN — DEXMEDETOMIDINE HYDROCHLORIDE 0.5 MCG/KG/HR: 4 INJECTION, SOLUTION INTRAVENOUS at 04:06

## 2023-06-12 RX ADMIN — SODIUM CHLORIDE SOLN NEBU 3% 4 ML: 3 NEBU SOLN at 08:06

## 2023-06-12 RX ADMIN — ATORVASTATIN CALCIUM 40 MG: 20 TABLET, FILM COATED ORAL at 09:06

## 2023-06-12 RX ADMIN — TORSEMIDE 20 MG: 20 TABLET ORAL at 09:06

## 2023-06-12 RX ADMIN — DEXMEDETOMIDINE HYDROCHLORIDE 1 MCG/KG/HR: 4 INJECTION, SOLUTION INTRAVENOUS at 07:06

## 2023-06-12 RX ADMIN — MORPHINE SULFATE 4 MG: 4 INJECTION, SOLUTION INTRAMUSCULAR; INTRAVENOUS at 04:06

## 2023-06-12 RX ADMIN — LORAZEPAM 1 MG: 2 INJECTION INTRAMUSCULAR; INTRAVENOUS at 09:06

## 2023-06-12 RX ADMIN — MUPIROCIN: 20 OINTMENT TOPICAL at 09:06

## 2023-06-12 RX ADMIN — MORPHINE SULFATE 4 MG: 4 INJECTION, SOLUTION INTRAMUSCULAR; INTRAVENOUS at 11:06

## 2023-06-12 RX ADMIN — HEPARIN SODIUM 5000 UNITS: 5000 INJECTION INTRAVENOUS; SUBCUTANEOUS at 03:06

## 2023-06-12 RX ADMIN — HYDRALAZINE HYDROCHLORIDE 20 MG: 20 INJECTION INTRAMUSCULAR; INTRAVENOUS at 06:06

## 2023-06-12 RX ADMIN — FERROUS SULFATE TAB 325 MG (65 MG ELEMENTAL FE) 1 EACH: 325 (65 FE) TAB at 09:06

## 2023-06-12 RX ADMIN — DEXMEDETOMIDINE HYDROCHLORIDE 0.7 MCG/KG/HR: 4 INJECTION, SOLUTION INTRAVENOUS at 10:06

## 2023-06-12 RX ADMIN — THERA TABS 1 TABLET: TAB at 09:06

## 2023-06-12 RX ADMIN — IPRATROPIUM BROMIDE AND ALBUTEROL SULFATE 3 ML: 2.5; .5 SOLUTION RESPIRATORY (INHALATION) at 07:06

## 2023-06-12 RX ADMIN — HYDROXYZINE HYDROCHLORIDE 25 MG: 25 TABLET ORAL at 06:06

## 2023-06-12 RX ADMIN — Medication 400 MG: at 09:06

## 2023-06-12 RX ADMIN — LORAZEPAM 1 MG: 2 INJECTION INTRAMUSCULAR; INTRAVENOUS at 03:06

## 2023-06-12 RX ADMIN — IPRATROPIUM BROMIDE AND ALBUTEROL SULFATE 3 ML: .5; 3 SOLUTION RESPIRATORY (INHALATION) at 08:06

## 2023-06-12 RX ADMIN — DILTIAZEM HYDROCHLORIDE 180 MG: 180 CAPSULE, COATED, EXTENDED RELEASE ORAL at 09:06

## 2023-06-12 RX ADMIN — MORPHINE SULFATE 4 MG: 4 INJECTION, SOLUTION INTRAMUSCULAR; INTRAVENOUS at 06:06

## 2023-06-12 RX ADMIN — METOPROLOL SUCCINATE 50 MG: 50 TABLET, EXTENDED RELEASE ORAL at 09:06

## 2023-06-12 RX ADMIN — IPRATROPIUM BROMIDE AND ALBUTEROL SULFATE 3 ML: 2.5; .5 SOLUTION RESPIRATORY (INHALATION) at 12:06

## 2023-06-12 RX ADMIN — BUSPIRONE HYDROCHLORIDE 10 MG: 10 TABLET ORAL at 09:06

## 2023-06-12 RX ADMIN — MICONAZOLE NITRATE 1 ML: 20 OINTMENT TOPICAL at 09:06

## 2023-06-12 RX ADMIN — DEXMEDETOMIDINE HYDROCHLORIDE 0.7 MCG/KG/HR: 4 INJECTION, SOLUTION INTRAVENOUS at 01:06

## 2023-06-12 RX ADMIN — CYANOCOBALAMIN TAB 1000 MCG 1000 MCG: 1000 TAB at 09:06

## 2023-06-12 RX ADMIN — LORAZEPAM 1 MG: 2 INJECTION INTRAMUSCULAR; INTRAVENOUS at 06:06

## 2023-06-12 RX ADMIN — FUROSEMIDE 40 MG: 10 INJECTION, SOLUTION INTRAMUSCULAR; INTRAVENOUS at 07:06

## 2023-06-12 RX ADMIN — LORAZEPAM 1 MG: 2 INJECTION INTRAMUSCULAR; INTRAVENOUS at 10:06

## 2023-06-12 RX ADMIN — Medication 1000 UNITS: at 09:06

## 2023-06-12 RX ADMIN — Medication 250 MG: at 09:06

## 2023-06-12 RX ADMIN — DULOXETINE 60 MG: 30 CAPSULE, DELAYED RELEASE ORAL at 09:06

## 2023-06-12 RX ADMIN — Medication 1 PATCH: at 09:06

## 2023-06-12 RX ADMIN — MICONAZOLE NITRATE: 20 OINTMENT TOPICAL at 09:06

## 2023-06-12 RX ADMIN — HEPARIN SODIUM 5000 UNITS: 5000 INJECTION INTRAVENOUS; SUBCUTANEOUS at 09:06

## 2023-06-12 NOTE — EICU
When attempting to get chest xray patient awoke and became very restless with hypoxia and hypertension .  On precedex 0.7 mcg/kg/hour    Morphine 4 mg IVP was given   On camera she is on 95% Fio2 and SPO2 95%  Appears somnolent  /80,RR22    Plan:  Obtain VBG to assess PCO2 levels  Titrate precedex infusion as needed.

## 2023-06-12 NOTE — PROGRESS NOTES
Progress Note  Pulmonary & Critical Care Medicine    Attending: sophia Aguilera  Fellow: Aysha Moss  Admit Date: 6/4/2023  Today's Date: 06/12/2023      SUBJECTIVE:     Agitation noted overnight. Precedex increased. This AM, she was very lethargic 2/2 sedation.     OBJECTIVE:     Vital Signs Trends/Hx Reviewed  Vitals:    06/12/23 0805 06/12/23 0839 06/12/23 0905 06/12/23 0907   BP: (!) 148/79  (!) 121/95 (!) 149/73   BP Location:       Patient Position:       Pulse: 87 75 102 94   Resp: 18 18 (!) 22    Temp:       TempSrc:       SpO2: (!) 91% (!) 93% (!) 93%    Weight:       Height:           Ventilator settings:   No data recorded  Oxygen Concentration (%):  [60-85] 60        Physical Exam:  General: Laying in bed asleep in NAD. Very lethargic.  HEENT: AT/NC, PERRL, EOMI, oral and nasal mucosa moist.   Neck: Supple without JVD or palpable LAD.   Cardiac: normal rate, regular rhythm, with no MRG with brisk cap refill and symmetric pulses in distal extremities.  Respiratory: Normal inspection. Symmetric chest rise. Normal palpation and percussion. Auscultation clear right lung. Minimal breath sounds on left. Mild increased work of breathing noted.   Abdomen: Soft, NT/ND. +BS. No hepatosplenomegaly.   Extremities: No edema.   Neuro: Very lethargic but arousable.       Laboratory:  Recent Labs   Lab 06/12/23  0645   PH 7.442   PCO2 46.0*   PO2 47   HCO3 31.3*   POCSATURATED 83*   BE 7     Recent Labs   Lab 06/12/23  0809   WBC 10.82   RBC 3.27*   HGB 10.4*   HCT 29.6*      MCV 91   MCH 31.8*   MCHC 35.1     Recent Labs   Lab 06/12/23  0809   *   K 4.2   CL 95   CO2 26   BUN 15   CREATININE 0.7       Microbiology Data:   Microbiology Results (last 7 days)       Procedure Component Value Units Date/Time    Urine culture [[id number]]  (Abnormal)  (Susceptibility) Collected: 06/05/23 0216    Order Status: Completed Specimen: Urine Updated: 06/07/23 1926     Urine Culture, Routine KLEBSIELLA  PNEUMONIAE  >100,000 cfu/ml  No other significant isolate      Narrative:      Specimen Source->Urine             Chest Imaging:   X-Ray Chest 1 View    Result Date: 6/12/2023  No significant interval changes.  See above discussion. This report was flagged in Epic as abnormal. Electronically signed by: Jam Jimenez Date:    06/12/2023 Time:    07:40        Infusions:     dexmedeTOMIDine (Precedex) infusion (titrating) 1 mcg/kg/hr (06/12/23 0751)       Scheduled Medications:    ascorbic acid (vitamin C)  250 mg Oral Daily    atorvastatin  40 mg Oral Daily    busPIRone  10 mg Oral BID    cyanocobalamin  1,000 mcg Oral Daily    diltiaZEM  180 mg Oral Daily    DULoxetine  60 mg Oral Daily    ferrous sulfate  1 tablet Oral Daily    fluticasone furoate-vilanteroL  1 puff Inhalation Daily    heparin (porcine)  5,000 Units Subcutaneous Q8H    magnesium oxide  400 mg Oral BID    metoprolol succinate  50 mg Oral Daily    miconazole nitrate 2%   Topical (Top) BID    multivitamin  1 tablet Oral Daily    mupirocin   Nasal BID    nicotine  1 patch Transdermal Daily    torsemide  20 mg Oral Daily    vitamin D  1,000 Units Oral Daily       PRN Medications:   acetaminophen, albuterol-ipratropium, hydrALAZINE, hydrOXYzine HCL, lorazepam, morphine, naloxone, ondansetron, sodium chloride 0.9%, sodium chloride 3%    Problem List:   Patient Active Problem List   Diagnosis    Chronic obstructive pulmonary disease    Opioid dependence    Essential hypertension    Iron deficiency anemia    Tobacco dependence    Hyperlipidemia    Osteoporosis    Generalized anxiety disorder    Hypomagnesemia    Pulmonary nodule    Diverticulosis    Hyperkalemia    Chronic diastolic (congestive) heart failure    History of L3 compression fracture    Vitamin D deficiency    Vitamin B12 deficiency    Chronic pain syndrome    History of lung cancer    Chronic GI bleeding    History of alcohol abuse    Choledocholithiasis    Elevated LFTs    Urinary retention     Cervical spinal stenosis    Debility    Hypocalcemia    Spinal stenosis    Multiple thyroid nodules    CKD (chronic kidney disease) stage 3, GFR 30-59 ml/min    MDD (major depressive disorder), recurrent, in partial remission    PTSD (post-traumatic stress disorder)    Aortic atherosclerosis    Iron deficiency anemia due to chronic blood loss    Syndrome of inappropriate secretion of antidiuretic hormone    Other nonthrombocytopenic purpura    Disorder of joint prosthesis    Secondary malignant neoplasm of intrathoracic lymph nodes    Recurrent adenocarcinoma of left lung    Shock, unspecified    Advance care planning    Anemia of chronic disease    Electrolyte abnormality    Pleural effusion    Myelopathy in diseases classified elsewhere    Diarrhea    Nonrheumatic aortic valve stenosis    Somnolence    Acute cystitis without hematuria    Acute hypoxemic respiratory failure       ASSESSMENT & RECOMMENDATIONS     Neuro  #Agitation  -Noted overnight and started on precedex gtt overnight.  -Very lethargic this AM.  -Will attempt to back off of sedation this AM.    Cardiovascular  #HFpEF  -2/13/23 TTE- LVEF 60%, G3DD  -Continue GDMT w/ torsemide  -Intermittent doses of lasix    #HTN  -Hypertensive episodes noted since admission. BP improved overnight.  -Continue current BP medication with PRNs    #PH  -WHO Group II  -Continue Torsemide    #HLD  -Continue statin    Pulmonary  #Acute Hypoxic Respiratory Failure  -Currently on 40L 60%. Targeting O2 sat 88-92%  -CXR reveals complete whiteout of left lung  -Bedside US shows small bilateral pleural effusions. Not big enough for thoracentesis.  -Believe whiteout to be 2/2 atelectasis vs mucus plugging.  -Discussed pulmonary toilet with RT. 3% saline nebs ordered to facilitate.     #COPD  -Continue inhaler and duoneb treatment.    GI  -No acute issues at this time.    Renal   -No acute issues at this time    ID  #UTI  -Treated with 3 days ceftriaxone    Heme/Onc  #Normocytic  Anemia  -montior    Endo  -No acute issues at this time    Rheum  -No acute issues at this time    Thank you for allowing us to participate in the care of this patient. We will continue to follow. Please call with questions.    Aysha Moss M.D., PGY-V  [employer] Pulmonary/Critical Care Fellow

## 2023-06-12 NOTE — PT/OT/SLP PROGRESS
Occupational Therapy      Patient Name:  Nalini Varma   MRN:  [id number]    Patient not seen today secondary to  (Transfer to ICU on 6/11/23.). Will follow-up when therapy orders reconciled.    6/12/2023

## 2023-06-12 NOTE — EICU
Intervention Initiated From:  Bedside    Alisson intervened regarding:  Other    Nurse Notified:  Yes    Doctor Notified:  Yes    Comments: 2048 eLert noted due to pt being anxious. Bedside rn in room reports that they pt had pulled multiple iv out and pulling oxygen off. Dr Kay notified and camera into room    End time: 2109   Since January 2022, the patient has lost 27 pounds.  I will continue to encourage the patient to increase his exercise, decrease his caloric intake, and reduce his weight.

## 2023-06-12 NOTE — ASSESSMENT & PLAN NOTE
6/12/2023:  - chart and interval history reviewed in depth, noting decompensation over the weekend due to respiratory and mental status leading to ICU transfer  - discussed with primary team  - patient seen at bedside along with palliative RN Amanda  - patient was somnolent and unable to respond to any verbal or physical stimuli. She is unable to current make medical decisions for herself given the severity of her condition  - thus, reached out to her HCPOAs which is her two sons Augie and Romain  - attempted to call Augie (HCPOA #1) x 3 but there was no answer  - tried calling Romain (HCPOA #2) and he answered on the second attempt  - introduced ourselves and role of palliative care to Romain  - he had last spoken to her on Mothers day and shared that she does not tend to share much overall regarding how she is doing. He and his brother live in . Romain is a traveling .   - he is aware of her breathing difficulties, recent shaikh with lung cancer reoccurence and her weakness, but does not know the details and when exactly she is in/out of the hospital  - updated him regarding the conversations we have had with her regarding encouraging her to call her sons, but she always states she does not wish to bother them with her stuff  - made him aware that due to her current condition she is unable to make decisions for herself and the fact that she had completed HCPOA paperwork prior outlining Augie and Romain as HCPOAs means that they are now her decision makers.   - Updated Romain regarding what led to her admission and her decompensation over the weekend and now the severity of her current condition given the L lung white out and increased O2 needs on HFNC.   - Romain requested a direct, open and honest conversation. Thereby we made him aware of the overall severity and concern of her condition even prior to this decompensation and the concern we have that she may not even make it out of this given all that she is up  against.   - made him aware of her wishes for DNR/DNI. He was accepting of that.   - Romain asked if things may be going to route of hospice. Thereby we let him know the teams are doing everything to work on diuresis with hope of improvement of her breathing, but if she continues to deteriorate and does not have any improvement in her breathing/mental status, then a transition to comfort care/hospice is very possible.   - made Romain aware that we always are on the side of caution and recommend family members to see their loved ones sooner rather than later when they are in such a condition. He was grateful and agreed.   - Romain is a travelling . He stated he is going to update his wife and his brother Augie and work on making arrangements to get out there asap.   [] DNR/DNI   [] continue with current management with limits to invasive intervention  [] son Romain to update brother Augie and work on coming here asap from  to see her  [] updated primary team regarding the above conversation

## 2023-06-12 NOTE — PROGRESS NOTES
[facility] - Intensive Care [facility]  Palliative Medicine  Progress Note    Patient Name: Nalini Varma  MRN: [id number]  Admission Date: 6/4/2023  Hospital Length of Stay: 7 days  Code Status: DNR   Attending Provider: Iron Ag MD  Consulting Provider: Lucho Gutierrez MD  Primary Care Physician: Yane Sahni MD  Principal Problem:Acute hypoxemic respiratory failure    Patient information was obtained from relative(s), past medical records and primary team.      Assessment/Plan:     Pulmonary  * Acute hypoxemic respiratory failure  - hx chf and copd  - interval hx: decompensated over weekend with increased SOB due to white out of L lung on imaging. Transferred to ICU and now on HFNC.   - PCCM focusing on diuresis  - called and updated her son Romain (HCPOA #2) regarding the above and the overall severity of condition    Chronic obstructive pulmonary disease  - Significant underlying disease; on home oxygen   - interval hx noted    Cardiac/Vascular  Chronic diastolic (congestive) heart failure  - Noted history; EF 60% and has Grade III left ventricular diastolic dysfunction.  - diuresis per primary team and pccm    Essential hypertension  - management per primary team and PCCM    Renal/  CKD (chronic kidney disease) stage 3, GFR 30-59 ml/min  - Noted underlying disease    Palliative Care  Advance care planning  6/12/2023:  - chart and interval history reviewed in depth, noting decompensation over the weekend due to respiratory and mental status leading to ICU transfer  - discussed with primary team  - patient seen at bedside along with palliative RN Amanda  - patient was somnolent and unable to respond to any verbal or physical stimuli. She is unable to current make medical decisions for herself given the severity of her condition  - thus, reached out to her HCPOAs which is her two sons Augie and Romain  - attempted to call Augie (HCPOA #1) x 3 but there was no answer  - tried calling Romain (HCPOA #2) and he  answered on the second attempt  - introduced ourselves and role of palliative care to Romain  - he had last spoken to her on Mothers day and shared that she does not tend to share much overall regarding how she is doing. He and his brother live in . Romain is a traveling .   - he is aware of her breathing difficulties, recent shaikh with lung cancer reoccurence and her weakness, but does not know the details and when exactly she is in/out of the hospital  - updated him regarding the conversations we have had with her regarding encouraging her to call her sons, but she always states she does not wish to bother them with her stuff  - made him aware that due to her current condition she is unable to make decisions for herself and the fact that she had completed HCPOA paperwork prior outlining Augie and Romain as HCPOAs means that they are now her decision makers.   - Updated Romain regarding what led to her admission and her decompensation over the weekend and now the severity of her current condition given the L lung white out and increased O2 needs on HFNC.   - Romain requested a direct, open and honest conversation. Thereby we made him aware of the overall severity and concern of her condition even prior to this decompensation and the concern we have that she may not even make it out of this given all that she is up against.   - made him aware of her wishes for DNR/DNI. He was accepting of that.   - Romain asked if things may be going to route of hospice. Thereby we let him know the teams are doing everything to work on diuresis with hope of improvement of her breathing, but if she continues to deteriorate and does not have any improvement in her breathing/mental status, then a transition to comfort care/hospice is very possible.   - made Romain aware that we always are on the side of caution and recommend family members to see their loved ones sooner rather than later when they are in such a condition. He was  "grateful and agreed.   - Romain is a travelling . He stated he is going to update his wife and his brother Augie and work on making arrangements to get out there asap.   [] DNR/DNI   [] continue with current management with limits to invasive intervention  [] son Romain to update brother Augie and work on coming here asap from  to see her  [] updated primary team regarding the above conversation    Other  Debility  - Mainly bed/wheelchair bound  - Transfers herself in/out of wheelchair  - plan was for SNF prior to this decompensation         I will follow-up with patient. Please contact us if you have any additional questions.    Subjective:     Chief Complaint:   Chief Complaint   Patient presents with    Leg Pain     Pt presents with bilateral leg pain x 16 hours. Pt denies recent trauma/injury.        HPI:   Per H&P: " The patient is a 72 y.o. female with a past medical history of Chronic obstructive pulmonary disease, Hyperlipidemia, Iron deficiency anemia, and Stage III CKD who presents for evaluation of bilateral leg pain. She reports she has been wheelchair bound for a year and states that she has had increased difficulty caring for herself over the last week. She reports she has been unable to transfer herself to her bed or to the toilet so she has been using a diaper. She called EMS today after she slid out of her wheelchair and was unable to get back into it without assistance. The patient reports she has been taking hydrocodone for the pain.  On initial workup, the patient is noted to have a low Na of 128 and appears disheveled.  The patient states she came to the hospital because she can't take care of herself anymore and would like placement for assistance."    Palliative care consulted for assistance with advanced care planning and support.       Hospital Course:  No notes on file    Medications:  Continuous Infusions:   dexmedeTOMIDine (Precedex) infusion (titrating) 1 mcg/kg/hr (06/12/23 3817) "     Scheduled Meds:   ascorbic acid (vitamin C)  250 mg Oral Daily    atorvastatin  40 mg Oral Daily    busPIRone  10 mg Oral BID    cyanocobalamin  1,000 mcg Oral Daily    diltiaZEM  180 mg Oral Daily    DULoxetine  60 mg Oral Daily    ferrous sulfate  1 tablet Oral Daily    fluticasone furoate-vilanteroL  1 puff Inhalation Daily    heparin (porcine)  5,000 Units Subcutaneous Q8H    magnesium oxide  400 mg Oral BID    metoprolol succinate  50 mg Oral Daily    miconazole nitrate 2%   Topical (Top) BID    multivitamin  1 tablet Oral Daily    mupirocin   Nasal BID    nicotine  1 patch Transdermal Daily    torsemide  20 mg Oral Daily    vitamin D  1,000 Units Oral Daily     PRN Meds:acetaminophen, albuterol-ipratropium, hydrALAZINE, hydrOXYzine HCL, lorazepam, morphine, naloxone, ondansetron, sodium chloride 0.9%, sodium chloride 3%    Objective:     Vital Signs (Most Recent):  Temp: 97 °F (36.1 °C) (06/12/23 0705)  Pulse: 94 (06/12/23 0907)  Resp: (!) 22 (06/12/23 0905)  BP: (!) 149/73 (06/12/23 0907)  SpO2: (!) 93 % (06/12/23 0905) Vital Signs (24h Range):  Temp:  [97 °F (36.1 °C)-98.6 °F (37 °C)] 97 °F (36.1 °C)  Pulse:  [] 94  Resp:  [16-38] 22  SpO2:  [66 %-99 %] 93 %  BP: (102-198)/() 149/73     Weight: 88.5 kg (195 lb 1.7 oz)  Body mass index is 31.51 kg/m².       Physical Exam  Vitals and nursing note reviewed.   Constitutional:       Appearance: She is ill-appearing.      Comments: Somnolent and unable to respond to verbal/physical stimuli   HENT:      Head: Normocephalic and atraumatic.   Eyes:      Comments: Eyes closed during visit   Cardiovascular:      Rate and Rhythm: Normal rate.   Pulmonary:      Comments: On HFNC  Skin:     General: Skin is warm.   Neurological:      Comments: Somnolent and unable to respond to verbal/physical stimuli            Review of Symptoms        Living Arrangements:  Lives alone    Psychosocial/Cultural:   See Palliative Psychosocial Note: Yes  -  lives alone at home  - has a friend (Zafar) that comes and checks on her  - mainly in wheelchair/ bed bound   - enjoys staying home and watching tv  - 2 sons who live in   **Primary  to Follow**  Palliative Care  Consult: No      Advance Care Planning   Advance Directives:   [place]POST: Yes    Do Not Resuscitate Status: Yes    Medical Power of : Yes    Goals of Care: Patient had previously stated her goal of improvement in condition but with limits to invasive therapies. Accordingly, we have decided that the best plan to meet the patient's goals includes continuing with treatment.       Significant Labs: reviewed  CBC:   Recent Labs   Lab 06/12/23  0809   WBC 10.82   HGB 10.4*   HCT 29.6*   MCV 91        BMP:  Recent Labs   Lab 06/12/23  0809   GLU 99   *   K 4.2   CL 95   CO2 26   BUN 15   CREATININE 0.7   CALCIUM 7.7*     LFT:  Lab Results   Component Value Date    AST 47 (H) 06/11/2023    ALKPHOS 184 (H) 06/11/2023    BILITOT 0.5 06/11/2023     Albumin:   Albumin   Date Value Ref Range Status   06/11/2023 1.8 (L) 3.5 - 5.2 g/dL Final     Protein:   Total Protein   Date Value Ref Range Status   06/11/2023 5.4 (L) 6.0 - 8.4 g/dL Final     Lactic acid:   Lab Results   Component Value Date    LACTATE 1.1 02/13/2023    LACTATE 0.7 01/17/2023       Significant Imaging: reviewed    > 50% of 35 min visit spent in chart review, face to face discussion of symptom assessment, coordination of care with other specialists, and d/c planning  16 minutes ACP time spent: goals of care, emotional support, formulating and communication prognosis and goals of care, exploring burden/ benefit of various approaches of treatment.     Lucho Gutierrez MD  Palliative Medicine  [facility] - Intensive Care [facility])

## 2023-06-12 NOTE — ASSESSMENT & PLAN NOTE
- Mainly bed/wheelchair bound  - Transfers herself in/out of wheelchair  - plan was for SNF prior to this decompensation

## 2023-06-12 NOTE — ASSESSMENT & PLAN NOTE
- hx chf and copd  - interval hx: decompensated over weekend with increased SOB due to white out of L lung on imaging. Transferred to ICU and now on HFNC.   - PCCM focusing on diuresis  - called and updated her son Romain (HCPOA #2) regarding the above and the overall severity of condition

## 2023-06-12 NOTE — ASSESSMENT & PLAN NOTE
- Noted history; EF 60% and has Grade III left ventricular diastolic dysfunction.  - diuresis per primary team and pccm

## 2023-06-12 NOTE — SUBJECTIVE & OBJECTIVE
Interval History: significant agitation when not sedated and complaining of severe air hunger when awakened. Unable to complete much of an interview without severe disorientation and agitation when awake. Recently received to PRN meds to sedate her.     Review of Systems   Unable to perform ROS: Mental status change   Objective:     Vital Signs (Most Recent):  Temp: 98.3 °F (36.8 °C) (06/12/23 0200)  Pulse: 95 (06/12/23 0645)  Resp: 20 (06/12/23 0645)  BP: 127/71 (06/12/23 0645)  SpO2: 99 % (06/12/23 0645) Vital Signs (24h Range):  Temp:  [98 °F (36.7 °C)-98.6 °F (37 °C)] 98.3 °F (36.8 °C)  Pulse:  [] 95  Resp:  [16-38] 20  SpO2:  [66 %-99 %] 99 %  BP: (102-198)/() 127/71     Weight: 88.5 kg (195 lb 1.7 oz)  Body mass index is 31.51 kg/m².    Intake/Output Summary (Last 24 hours) at 6/12/2023 0736  Last data filed at 6/12/2023 0703  Gross per 24 hour   Intake 165.24 ml   Output 1000 ml   Net -834.76 ml         Physical Exam  Eyes:      General: No scleral icterus.  Cardiovascular:      Rate and Rhythm: Normal rate and regular rhythm.      Heart sounds: Murmur heard.   Pulmonary:      Breath sounds: No wheezing.      Comments: Increased WOB with pursed lip exhale, tachypnea. Decreased breath sounds on left. Coarse sounds on expiration with some rales at right base.  Abdominal:      General: Abdomen is flat. Bowel sounds are normal.      Palpations: Abdomen is soft.      Tenderness: There is no abdominal tenderness.   Musculoskeletal:      Right lower leg: No edema.      Left lower leg: No edema.   Skin:     General: Skin is warm and dry.      Coloration: Skin is pale.   Neurological:      Mental Status: She is oriented to person, place, and time.      Comments: Agitated, disoriented when not sedated. Did follow some commands before becoming very dyspneic           Significant Labs: All pertinent labs within the past 24 hours have been reviewed.    Significant Imaging: I have reviewed all pertinent imaging  results/findings within the past 24 hours.

## 2023-06-12 NOTE — CARE UPDATE
06/12/23 0839   Patient Assessment/Suction   Level of Consciousness (AVPU) responds to voice   Respiratory Effort Normal;Unlabored   Expansion/Accessory Muscles/Retractions no retractions;no use of accessory muscles   All Lung Fields Breath Sounds coarse   Skin Integrity   $ Wound Care Tech Time 15 min   Area Observed Left;Right;Behind ear;Nares   Skin Appearance without discoloration   PRE-TX-O2   Device (Oxygen Therapy) high flow nasal cannula w/device   $ Is the patient on High Flow Oxygen? Yes   $ Noninvasive Daily Charge Noninvasive Daily   Humidification temp set 33   Humidification temp actual 33   Flow (L/min) 40   Oxygen Concentration (%) 60   SpO2 (!) 93 %   Pulse Oximetry Type Continuous   $ Pulse Oximetry - Multiple Charge Pulse Oximetry - Multiple   Pulse 75   Resp 18   Aerosol Therapy   $ Aerosol Therapy Charges Aerosol Treatment  (duo and 3%)   Daily Review of Necessity (SVN) completed   Respiratory Treatment Status (SVN) given   Treatment Route (SVN) in-line   Patient Position (SVN) semi-Obrien's   Post Treatment Assessment (SVN) breath sounds unchanged   Signs of Intolerance (SVN) none   Ready to Wean/Extubation Screen   FIO2<=50 (chart decimal) (!) 0.6

## 2023-06-12 NOTE — HOSPITAL COURSE
Patient was admitted with UTI and failure to thrive and was treated for such. Improved and was stable for discharge, prolonged waiting for placement and the patient was placed on the [facility] medicine team. 6/11 rapid response called for severe hypoxia and AMS and cxr revealed a white out left lung with pulmonary edema of the right lung. She was placed on high flow oxygen and placed in the ICU. She has had severe agitation and disorientation and did not tolerate BiPAP or even face mask. She received IV diuresis and monitoring in the ICU. Mental status waxed and waned.      Discussed with oncology, patient was not a candidate for further treatment for lung cancer with chemotherapy or radiation.  In terms of her COPD/chronic respiratory failure she is considered end stage and is still smoking.  Palliative care was consulted, her son and daughter in law visited with her and were involved in the decision making process as patient was unable to make decisions and indicated her son should do it for her.  She became increasingly lethargic, was unable to take in anything by mouth and required morphine injections frequently for dyspnea/pain.  Decision was made to pursue inpatient hospice as she appeared to be close to death and in need of comfort care.  Transitioned to comfort care here on 6/15 while hospice was consulted.  Ultimately she required a morphine drip to keep her breathing comfortably.  She was transferred to [facility] for inpatient hospice on 6/16.

## 2023-06-12 NOTE — EICU
Intervention Initiated From:  Bedside    Alisson intervened regarding:  Other    Nurse Notified:  Yes    Doctor Notified:  Yes    Comments: 0626 eLert noted due to pt being agitated, o2 sats < 90% precedex gtt infusing .7mcg/kg/hr. rn at bedside requesting Md in room Dr Kay notified and camera into room.     End time: 0638

## 2023-06-12 NOTE — ASSESSMENT & PLAN NOTE
Patient is identified as having Diastolic (HFpEF) heart failure that is Chronic. CHF is currently controlled. Latest ECHO performed and demonstrates- Results for orders placed during the hospital encounter of 02/13/23    Echo    Interpretation Summary  · The left ventricle is normal in size with concentric remodeling and normal systolic function.  · Mild left atrial enlargement.  · The estimated ejection fraction is 60%.  · Grade III left ventricular diastolic dysfunction.  · Normal right ventricular size with normal right ventricular systolic function.  · There is mild aortic valve stenosis.  · Aortic valve area is 1.50 cm2; peak velocity is 3.06 m/s; mean gradient is 22 mmHg.  · Mild tricuspid regurgitation.  . Continue Beta Blocker and ACE/ARB and monitor clinical status closely. Monitor on telemetry. Patient is off CHF pathway.  Monitor strict Is&Os and daily weights.  Place on fluid restriction of 1.5 L.  on diet for CHF. Last BNP reviewed- and noted below   No results for input(s): BNP, BNPTRIAGEBLO in the last 168 hours..    Per acute resp failure   pt bradycardic <45. no interventions at the moment. held precedex Low urine output

## 2023-06-12 NOTE — EICU
"Remains agitated and very restless ,hypertensive with complains "I cant breath"inspite of  precedex 0.7 mcg/kg/hour    Plan:  Increase dexmedetomidine infusion to 1.4 mcg/kg/hour maximum dose  Morphine 4 mg IVP every 4 hours prn ordered for 4 doses.  Nicotine patch 21 mg now and daily.  May attempt 10/5 BIPAP,85%.  "

## 2023-06-12 NOTE — PROGRESS NOTES
[facility] Intensive Care [facility] Medicine  Progress Note    Patient Name: Nalini Varma  MRN: [id number]  Patient Class: IP- Inpatient   Admission Date: 6/4/2023  Length of Stay: 7 days  Attending Physician: Iron Ag MD  Primary Care Provider: Yane Sahni MD        Subjective:     Principal Problem:Acute hypoxemic respiratory failure        HPI:  The patient is a 72 y.o. female with a past medical history of Chronic obstructive pulmonary disease, Hyperlipidemia, Iron deficiency anemia, and Stage III CKD who presents for evaluation of bilateral leg pain. She reports she has been wheelchair bound for a year and states that she has had increased difficulty caring for herself over the last week. She reports she has been unable to transfer herself to her bed or to the toilet so she has been using a diaper. She called EMS today after she slid out of her wheelchair and was unable to get back into it without assistance. The patient reports she has been taking hydrocodone for the pain.  On initial workup, the patient is noted to have a low Na of 128 and appears disheveled.  The patient states she came to the hospital because she can't take care of herself anymore and would like placement for assistance.      Overview/Hospital Course:  Patient was admitted with UTI and failure to thrive and was treated for such. Improved and was stable for discharge, prolonged waiting for placement and the patient was placed on the virtual medicine team. 6/11 rapid response called for severe hypoxia and AMS and cxr revealed a white out left lung with pulmonary edema of the right lung. She was placed on high flow oxygen and placed in the ICU. She has had severe agitation and disorientation and did not tolerate bipap or even face mask. She received IV diuresis and monitoring in the ICU. Palliative care was reconsulted.      Interval History: significant agitation when not sedated and complaining of severe air hunger when  awakened. Unable to complete much of an interview without severe disorientation and agitation when awake. Recently received to PRN meds to sedate her.     Review of Systems   Unable to perform ROS: Mental status change   Objective:     Vital Signs (Most Recent):  Temp: 98.3 °F (36.8 °C) (06/12/23 0200)  Pulse: 95 (06/12/23 0645)  Resp: 20 (06/12/23 0645)  BP: 127/71 (06/12/23 0645)  SpO2: 99 % (06/12/23 0645) Vital Signs (24h Range):  Temp:  [98 °F (36.7 °C)-98.6 °F (37 °C)] 98.3 °F (36.8 °C)  Pulse:  [] 95  Resp:  [16-38] 20  SpO2:  [66 %-99 %] 99 %  BP: (102-198)/() 127/71     Weight: 88.5 kg (195 lb 1.7 oz)  Body mass index is 31.51 kg/m².    Intake/Output Summary (Last 24 hours) at 6/12/2023 0736  Last data filed at 6/12/2023 0703  Gross per 24 hour   Intake 165.24 ml   Output 1000 ml   Net -834.76 ml         Physical Exam  Eyes:      General: No scleral icterus.  Cardiovascular:      Rate and Rhythm: Normal rate and regular rhythm.      Heart sounds: Murmur heard.   Pulmonary:      Breath sounds: No wheezing.      Comments: Increased WOB with pursed lip exhale, tachypnea. Decreased breath sounds on left. Coarse sounds on expiration with some rales at right base.  Abdominal:      General: Abdomen is flat. Bowel sounds are normal.      Palpations: Abdomen is soft.      Tenderness: There is no abdominal tenderness.   Musculoskeletal:      Right lower leg: No edema.      Left lower leg: No edema.   Skin:     General: Skin is warm and dry.      Coloration: Skin is pale.   Neurological:      Mental Status: She is oriented to person, place, and time.      Comments: Agitated, disoriented when not sedated. Did follow some commands before becoming very dyspneic           Significant Labs: All pertinent labs within the past 24 hours have been reviewed.    Significant Imaging: I have reviewed all pertinent imaging results/findings within the past 24 hours.      Assessment/Plan:      * Acute hypoxemic  respiratory failure  Rapid response 6/11/23: Patient was on venturi mask 40% (8L) and desaturated to 50-60% this evening. Patient appeared cold and cyanotic but alert. Dr. Austin was present. Lung auscultation revealed diminished air entry to bilateral lungs, L>R. Given IV morphine 2mg x1 .Patient was placed on high flow 12L, which saw Pox improving to 83%. Patient reports feeling better, color returned to face. Given acute desaturation requiring Vapotherm support, to transfer to ICU for monitoring tonight.      Plan:  Whiteout left lung, afebrile without leukocytosis. H/O CHF and COPD and recurrent lung CA on recent chem/rads late 2022  Continue oxygen support per pulm  Continue IV lasix for diuresis  Will discuss with palliative  Repeat CXR       Acute cystitis without hematuria  Temp Readings from Last 3 Encounters:   06/09/23 97.7 °F (36.5 °C) (Oral)   03/13/23 98.1 °F (36.7 °C)   03/06/23 98.3 °F (36.8 °C) (Oral)     Lab Results   Component Value Date    WBC 6.71 06/07/2023     No results for input(s): LACTATE in the last 72 hours.    Has completed course of IV Rocephin    Cultures were taken-   Microbiology Results (last 7 days)     Procedure Component Value Units Date/Time    Urine culture [[id number]]  (Abnormal)  (Susceptibility) Collected: 06/05/23 0216    Order Status: Completed Specimen: Urine Updated: 06/07/23 1926     Urine Culture, Routine KLEBSIELLA PNEUMONIAE  >100,000 cfu/ml  No other significant isolate      Narrative:      Specimen Source->Urine          Somnolence  resolved    Xanax, gabapentin, norco, trazodone and promethazine on her home med list. Don't think this polypharmacy is helping her to improve her functional status if that is truly her goal. She has been off these meds since arrival without complaint as of yet.    Syndrome of inappropriate secretion of antidiuretic hormone  History of SIADH, monitor. May need to fluid restrict her some and encourage more solid PO intake as she has  decreased her oral intake in the past few months      CKD (chronic kidney disease) stage 3, GFR 30-59 ml/min  Creatinine 1, at baseline    Monitor for acute decompensation      Debility  Patient with increasing weakness, appearance of inability to care for herself.  Reports becoming bedbound and now inability to transfer in and out of her wheelchair.    Needs SNF placement and likely would benefit from senior care NH but she is not sure she's ready to make that step. Has 1 friend she relies on to essentially do most of her care that is not simple ADLs.    Ok for discharge to SNF    Chronic diastolic (congestive) heart failure  Patient is identified as having Diastolic (HFpEF) heart failure that is Chronic. CHF is currently controlled. Latest ECHO performed and demonstrates- Results for orders placed during the hospital encounter of 02/13/23    Echo    Interpretation Summary  · The left ventricle is normal in size with concentric remodeling and normal systolic function.  · Mild left atrial enlargement.  · The estimated ejection fraction is 60%.  · Grade III left ventricular diastolic dysfunction.  · Normal right ventricular size with normal right ventricular systolic function.  · There is mild aortic valve stenosis.  · Aortic valve area is 1.50 cm2; peak velocity is 3.06 m/s; mean gradient is 22 mmHg.  · Mild tricuspid regurgitation.  . Continue Beta Blocker and ACE/ARB and monitor clinical status closely. Monitor on telemetry. Patient is off CHF pathway.  Monitor strict Is&Os and daily weights.  Place on fluid restriction of 1.5 L.  on diet for CHF. Last BNP reviewed- and noted below   No results for input(s): BNP, BNPTRIAGEBLO in the last 168 hours..    Per acute resp failure    Hyperlipidemia  Continue Lipitor      Tobacco dependence        Essential hypertension  Normotensive currently    Continue cardizem, toprol, torsemide      Chronic obstructive pulmonary disease  Continue Breo as interchange  Duonebs  PRN        VTE Risk Mitigation (From admission, onward)         Ordered     heparin (porcine) injection 5,000 Units  Every 8 hours         06/05/23 0216     IP VTE HIGH RISK PATIENT  Once         06/05/23 0216     Place sequential compression device  Until discontinued         06/05/23 0216                Discharge Planning   FELICIANO:      Code Status: DNR   Is the patient medically ready for discharge?: Yes    Reason for patient still in hospital (select all that apply): Patient unstable and Treatment  Discharge Plan A: Home                  Iron Ag MD  Department of Hospital Medicine   [facility] - Intensive [facility] ([facility]

## 2023-06-12 NOTE — EICU
Patient noted to be restless and dyspneic and appears ashen ?due to hypoxia  Dislodged multiple IV access and oxygen nasal cannula.    ND 95/,BP /104,SPO2 84% which improved to 91% on HHFNC 85%,35L/minute(was on 40%)    Received morphine 2 mg IVP and lasix 60 mg IVP just now    Plan:  SL NTG 0.4 once now  Haloperidol 2 mg IVP once  Bladder scan(less than 50 ml)  Buspirone 10 mg po tid for anxiety(on trazodone,gabapentin,xanax  Hydralazine 20 mg IVP every 6 hours prn for SBP greater than 160 mm of hg.  Will need precedex infusion to keep RASS 0 to -1

## 2023-06-12 NOTE — EICU
Intervention Initiated From:  Bedside    Alisson intervened regarding:  Other    Nurse Notified:  Yes    Doctor Notified:  Yes    Comments: 2225 eLert noted due to wanting MD to camera into room. Rn reports pt remains to be agitated. Precedex gtt infusing as ordered. Dr Kay notified. Camera into room. Bipap attempted as ordered, pt not able to tolerate.    End time: 2252

## 2023-06-12 NOTE — SUBJECTIVE & OBJECTIVE
Medications:  Continuous Infusions:   dexmedeTOMIDine (Precedex) infusion (titrating) 1 mcg/kg/hr (06/12/23 0757)     Scheduled Meds:   ascorbic acid (vitamin C)  250 mg Oral Daily    atorvastatin  40 mg Oral Daily    busPIRone  10 mg Oral BID    cyanocobalamin  1,000 mcg Oral Daily    diltiaZEM  180 mg Oral Daily    DULoxetine  60 mg Oral Daily    ferrous sulfate  1 tablet Oral Daily    fluticasone furoate-vilanteroL  1 puff Inhalation Daily    heparin (porcine)  5,000 Units Subcutaneous Q8H    magnesium oxide  400 mg Oral BID    metoprolol succinate  50 mg Oral Daily    miconazole nitrate 2%   Topical (Top) BID    multivitamin  1 tablet Oral Daily    mupirocin   Nasal BID    nicotine  1 patch Transdermal Daily    torsemide  20 mg Oral Daily    vitamin D  1,000 Units Oral Daily     PRN Meds:acetaminophen, albuterol-ipratropium, hydrALAZINE, hydrOXYzine HCL, lorazepam, morphine, naloxone, ondansetron, sodium chloride 0.9%, sodium chloride 3%    Objective:     Vital Signs (Most Recent):  Temp: 97 °F (36.1 °C) (06/12/23 0705)  Pulse: 94 (06/12/23 0907)  Resp: (!) 22 (06/12/23 0905)  BP: (!) 149/73 (06/12/23 0907)  SpO2: (!) 93 % (06/12/23 0905) Vital Signs (24h Range):  Temp:  [97 °F (36.1 °C)-98.6 °F (37 °C)] 97 °F (36.1 °C)  Pulse:  [] 94  Resp:  [16-38] 22  SpO2:  [66 %-99 %] 93 %  BP: (102-198)/() 149/73     Weight: 88.5 kg (195 lb 1.7 oz)  Body mass index is 31.51 kg/m².       Physical Exam  Vitals and nursing note reviewed.   Constitutional:       Appearance: She is ill-appearing.      Comments: Somnolent and unable to respond to verbal/physical stimuli   HENT:      Head: Normocephalic and atraumatic.   Eyes:      Comments: Eyes closed during visit   Cardiovascular:      Rate and Rhythm: Normal rate.   Pulmonary:      Comments: On HFNC  Skin:     General: Skin is warm.   Neurological:      Comments: Somnolent and unable to respond to verbal/physical stimuli            Review of Symptoms        Living  Arrangements:  Lives alone    Psychosocial/Cultural:   See Palliative Psychosocial Note: Yes  - lives alone at home  - has a friend (Zafar) that comes and checks on her  - mainly in wheelchair/ bed bound   - enjoys staying home and watching tv  - 2 sons who live in   **Primary  to Follow**  Palliative Care  Consult: No      Advance Care Planning   Advance Directives:   [place]: Yes    Do Not Resuscitate Status: Yes    Medical Power of : Yes    Goals of Care: Patient had previously stated her goal of improvement in condition but with limits to invasive therapies. Accordingly, we have decided that the best plan to meet the patient's goals includes continuing with treatment.       Significant Labs: reviewed  CBC:   Recent Labs   Lab 06/12/23  0809   WBC 10.82   HGB 10.4*   HCT 29.6*   MCV 91        BMP:  Recent Labs   Lab 06/12/23  0809   GLU 99   *   K 4.2   CL 95   CO2 26   BUN 15   CREATININE 0.7   CALCIUM 7.7*     LFT:  Lab Results   Component Value Date    AST 47 (H) 06/11/2023    ALKPHOS 184 (H) 06/11/2023    BILITOT 0.5 06/11/2023     Albumin:   Albumin   Date Value Ref Range Status   06/11/2023 1.8 (L) 3.5 - 5.2 g/dL Final     Protein:   Total Protein   Date Value Ref Range Status   06/11/2023 5.4 (L) 6.0 - 8.4 g/dL Final     Lactic acid:   Lab Results   Component Value Date    LACTATE 1.1 02/13/2023    LACTATE 0.7 01/17/2023       Significant Imaging: reviewed

## 2023-06-13 LAB
ALBUMIN SERPL BCP-MCNC: 1.7 G/DL (ref 3.5–5.2)
ALP SERPL-CCNC: 133 U/L (ref 55–135)
ALT SERPL W/O P-5'-P-CCNC: 19 U/L (ref 10–44)
ANION GAP SERPL CALC-SCNC: 13 MMOL/L (ref 8–16)
AST SERPL-CCNC: 46 U/L (ref 10–40)
BASOPHILS # BLD AUTO: 0.02 K/UL (ref 0–0.2)
BASOPHILS NFR BLD: 0.2 % (ref 0–1.9)
BILIRUB SERPL-MCNC: 0.3 MG/DL (ref 0.1–1)
BUN SERPL-MCNC: 21 MG/DL (ref 8–23)
CALCIUM SERPL-MCNC: 8.1 MG/DL (ref 8.7–10.5)
CHLORIDE SERPL-SCNC: 94 MMOL/L (ref 95–110)
CO2 SERPL-SCNC: 28 MMOL/L (ref 23–29)
CREAT SERPL-MCNC: 0.9 MG/DL (ref 0.5–1.4)
DIFFERENTIAL METHOD: ABNORMAL
EOSINOPHIL # BLD AUTO: 0 K/UL (ref 0–0.5)
EOSINOPHIL NFR BLD: 0 % (ref 0–8)
ERYTHROCYTE [DISTWIDTH] IN BLOOD BY AUTOMATED COUNT: 17.9 % (ref 11.5–14.5)
EST. GFR  (NO RACE VARIABLE): >60 ML/MIN/1.73 M^2
GLUCOSE SERPL-MCNC: 92 MG/DL (ref 70–110)
HCT VFR BLD AUTO: 30.9 % (ref 37–48.5)
HGB BLD-MCNC: 10.5 G/DL (ref 12–16)
IMM GRANULOCYTES # BLD AUTO: 0.15 K/UL (ref 0–0.04)
IMM GRANULOCYTES NFR BLD AUTO: 1.3 % (ref 0–0.5)
LYMPHOCYTES # BLD AUTO: 1.2 K/UL (ref 1–4.8)
LYMPHOCYTES NFR BLD: 10.9 % (ref 18–48)
MAGNESIUM SERPL-MCNC: 1.8 MG/DL (ref 1.6–2.6)
MCH RBC QN AUTO: 32.2 PG (ref 27–31)
MCHC RBC AUTO-ENTMCNC: 34 G/DL (ref 32–36)
MCV RBC AUTO: 95 FL (ref 82–98)
MONOCYTES # BLD AUTO: 1 K/UL (ref 0.3–1)
MONOCYTES NFR BLD: 9.2 % (ref 4–15)
NEUTROPHILS # BLD AUTO: 8.8 K/UL (ref 1.8–7.7)
NEUTROPHILS NFR BLD: 78.4 % (ref 38–73)
NRBC BLD-RTO: 0 /100 WBC
PLATELET # BLD AUTO: 291 K/UL (ref 150–450)
PMV BLD AUTO: 9.8 FL (ref 9.2–12.9)
POTASSIUM SERPL-SCNC: 4.3 MMOL/L (ref 3.5–5.1)
PROT SERPL-MCNC: 5.3 G/DL (ref 6–8.4)
RBC # BLD AUTO: 3.26 M/UL (ref 4–5.4)
SODIUM SERPL-SCNC: 135 MMOL/L (ref 136–145)
WBC # BLD AUTO: 11.22 K/UL (ref 3.9–12.7)

## 2023-06-13 PROCEDURE — 63600175 PHARM REV CODE 636 W HCPCS: Performed by: INTERNAL MEDICINE

## 2023-06-13 PROCEDURE — 99233 PR SUBSEQUENT HOSPITAL CARE,LEVL III: ICD-10-PCS | Mod: ,,, | Performed by: STUDENT IN AN ORGANIZED HEALTH CARE EDUCATION/TRAINING PROGRAM

## 2023-06-13 PROCEDURE — 63600175 PHARM REV CODE 636 W HCPCS

## 2023-06-13 PROCEDURE — 20000000 HC ICU ROOM

## 2023-06-13 PROCEDURE — 51798 US URINE CAPACITY MEASURE: CPT

## 2023-06-13 PROCEDURE — 83735 ASSAY OF MAGNESIUM: CPT | Performed by: INTERNAL MEDICINE

## 2023-06-13 PROCEDURE — 85025 COMPLETE CBC W/AUTO DIFF WBC: CPT | Performed by: INTERNAL MEDICINE

## 2023-06-13 PROCEDURE — 25000242 PHARM REV CODE 250 ALT 637 W/ HCPCS: Performed by: STUDENT IN AN ORGANIZED HEALTH CARE EDUCATION/TRAINING PROGRAM

## 2023-06-13 PROCEDURE — 94799 UNLISTED PULMONARY SVC/PX: CPT

## 2023-06-13 PROCEDURE — 99233 SBSQ HOSP IP/OBS HIGH 50: CPT | Mod: ,,, | Performed by: STUDENT IN AN ORGANIZED HEALTH CARE EDUCATION/TRAINING PROGRAM

## 2023-06-13 PROCEDURE — 25000003 PHARM REV CODE 250: Performed by: HOSPITALIST

## 2023-06-13 PROCEDURE — 99497 PR ADVNCD CARE PLAN 30 MIN: ICD-10-PCS | Mod: ,,, | Performed by: STUDENT IN AN ORGANIZED HEALTH CARE EDUCATION/TRAINING PROGRAM

## 2023-06-13 PROCEDURE — 27100171 HC OXYGEN HIGH FLOW UP TO 24 HOURS

## 2023-06-13 PROCEDURE — 63600175 PHARM REV CODE 636 W HCPCS: Performed by: STUDENT IN AN ORGANIZED HEALTH CARE EDUCATION/TRAINING PROGRAM

## 2023-06-13 PROCEDURE — 80053 COMPREHEN METABOLIC PANEL: CPT | Performed by: INTERNAL MEDICINE

## 2023-06-13 PROCEDURE — 99233 PR SUBSEQUENT HOSPITAL CARE,LEVL III: ICD-10-PCS | Mod: ,,, | Performed by: HOSPITALIST

## 2023-06-13 PROCEDURE — 63600175 PHARM REV CODE 636 W HCPCS: Performed by: NURSE PRACTITIONER

## 2023-06-13 PROCEDURE — 25000003 PHARM REV CODE 250: Performed by: NURSE PRACTITIONER

## 2023-06-13 PROCEDURE — 99233 SBSQ HOSP IP/OBS HIGH 50: CPT | Mod: ,,, | Performed by: HOSPITALIST

## 2023-06-13 PROCEDURE — 99900035 HC TECH TIME PER 15 MIN (STAT)

## 2023-06-13 PROCEDURE — 25000003 PHARM REV CODE 250: Performed by: INTERNAL MEDICINE

## 2023-06-13 PROCEDURE — 94640 AIRWAY INHALATION TREATMENT: CPT

## 2023-06-13 PROCEDURE — 99497 ADVNCD CARE PLAN 30 MIN: CPT | Mod: ,,, | Performed by: STUDENT IN AN ORGANIZED HEALTH CARE EDUCATION/TRAINING PROGRAM

## 2023-06-13 PROCEDURE — 99233 SBSQ HOSP IP/OBS HIGH 50: CPT | Mod: GC,,, | Performed by: INTERNAL MEDICINE

## 2023-06-13 PROCEDURE — 99233 PR SUBSEQUENT HOSPITAL CARE,LEVL III: ICD-10-PCS | Mod: GC,,, | Performed by: INTERNAL MEDICINE

## 2023-06-13 PROCEDURE — 94761 N-INVAS EAR/PLS OXIMETRY MLT: CPT

## 2023-06-13 PROCEDURE — 36415 COLL VENOUS BLD VENIPUNCTURE: CPT | Performed by: INTERNAL MEDICINE

## 2023-06-13 PROCEDURE — S4991 NICOTINE PATCH NONLEGEND: HCPCS | Performed by: INTERNAL MEDICINE

## 2023-06-13 RX ORDER — MORPHINE SULFATE 4 MG/ML
4 INJECTION, SOLUTION INTRAMUSCULAR; INTRAVENOUS EVERY 4 HOURS PRN
Status: CANCELLED | OUTPATIENT
Start: 2023-06-13

## 2023-06-13 RX ORDER — MORPHINE SULFATE 4 MG/ML
4 INJECTION, SOLUTION INTRAMUSCULAR; INTRAVENOUS EVERY 4 HOURS PRN
Status: DISCONTINUED | OUTPATIENT
Start: 2023-06-13 | End: 2023-06-15

## 2023-06-13 RX ADMIN — MUPIROCIN: 20 OINTMENT TOPICAL at 11:06

## 2023-06-13 RX ADMIN — IPRATROPIUM BROMIDE AND ALBUTEROL SULFATE 3 ML: 2.5; .5 SOLUTION RESPIRATORY (INHALATION) at 07:06

## 2023-06-13 RX ADMIN — MORPHINE SULFATE 4 MG: 4 INJECTION, SOLUTION INTRAMUSCULAR; INTRAVENOUS at 12:06

## 2023-06-13 RX ADMIN — Medication 1 PATCH: at 11:06

## 2023-06-13 RX ADMIN — HEPARIN SODIUM 5000 UNITS: 5000 INJECTION INTRAVENOUS; SUBCUTANEOUS at 03:06

## 2023-06-13 RX ADMIN — BUSPIRONE HYDROCHLORIDE 10 MG: 10 TABLET ORAL at 09:06

## 2023-06-13 RX ADMIN — MORPHINE SULFATE 4 MG: 4 INJECTION, SOLUTION INTRAMUSCULAR; INTRAVENOUS at 04:06

## 2023-06-13 RX ADMIN — MICONAZOLE NITRATE: 20 OINTMENT TOPICAL at 11:06

## 2023-06-13 RX ADMIN — MORPHINE SULFATE 4 MG: 4 INJECTION, SOLUTION INTRAMUSCULAR; INTRAVENOUS at 05:06

## 2023-06-13 RX ADMIN — IPRATROPIUM BROMIDE AND ALBUTEROL SULFATE 3 ML: 2.5; .5 SOLUTION RESPIRATORY (INHALATION) at 12:06

## 2023-06-13 RX ADMIN — IPRATROPIUM BROMIDE AND ALBUTEROL SULFATE 3 ML: 2.5; .5 SOLUTION RESPIRATORY (INHALATION) at 11:06

## 2023-06-13 RX ADMIN — Medication 400 MG: at 09:06

## 2023-06-13 RX ADMIN — MUPIROCIN: 20 OINTMENT TOPICAL at 09:06

## 2023-06-13 RX ADMIN — MORPHINE SULFATE 4 MG: 4 INJECTION, SOLUTION INTRAMUSCULAR; INTRAVENOUS at 09:06

## 2023-06-13 RX ADMIN — HEPARIN SODIUM 5000 UNITS: 5000 INJECTION INTRAVENOUS; SUBCUTANEOUS at 06:06

## 2023-06-13 RX ADMIN — LORAZEPAM 1 MG: 2 INJECTION INTRAMUSCULAR; INTRAVENOUS at 02:06

## 2023-06-13 RX ADMIN — HEPARIN SODIUM 5000 UNITS: 5000 INJECTION INTRAVENOUS; SUBCUTANEOUS at 09:06

## 2023-06-13 RX ADMIN — DEXMEDETOMIDINE HYDROCHLORIDE 0.8 MCG/KG/HR: 4 INJECTION, SOLUTION INTRAVENOUS at 04:06

## 2023-06-13 RX ADMIN — MICONAZOLE NITRATE: 20 OINTMENT TOPICAL at 09:06

## 2023-06-13 NOTE — PROGRESS NOTES
[facility] - Intensive Care [facility]  Palliative Medicine  Progress Note    Patient Name: Nalini Varma  MRN: [id number]  Admission Date: 6/4/2023  Hospital Length of Stay: 8 days  Code Status: DNR   Attending Provider: Breana Mckinney MD  Consulting Provider: Lucho Gutierrez MD  Primary Care Physician: Yane Sahni MD  Principal Problem:Acute hypoxemic respiratory failure    Patient information was obtained from patient, relative(s), past medical records and primary team.      Assessment/Plan:     Pulmonary  * Acute hypoxemic respiratory failure  - hx chf and copd  - interval hx: decompensated over 6/11 weekend with increased SOB due to white out of L lung on imaging believed to be due to atelectasis vs mucus plug. Transferred to ICU and now on HFNC.   - PCCM focusing on diuresis and weaning O2 requirements as able  - cxray 6/13 showing:  There has been significant improvement in the left hemithorax with re-expansion although there is still significant loss of volume and pleural fluid are of left lung base.  The right lung has worsened with a diffuse alveolar process throughout the right lung field that is more extensive than on previous radiographs.  - called and updated her son Augie, please refer to conversation in ACP for further details  - primary team and pccm managing SOB with morphine 4mg IV q4h PRN which has been helping (16mg in last 24 hours). Per nursing there are times where she asks for it too.    Chronic obstructive pulmonary disease  - Significant underlying disease; on home oxygen   - interval hx noted    Cardiac/Vascular  Chronic diastolic (congestive) heart failure  - Noted history; EF 60% and has Grade III left ventricular diastolic dysfunction.  - diuresis per primary team and pccm  - likely needs pablo for accurate measurement, minimal urine output with purewick per bedside nurse    Essential hypertension  - management per primary team and PCCM    Renal/  CKD (chronic kidney disease)  stage 3, GFR 30-59 ml/min  - Noted underlying disease    Oncology  Recurrent adenocarcinoma of left lung  - Per Dr. Gilmore during this admission, patient has completed her course of treatment and will need follow up with oncology outpatient on discharge    Palliative Care  Advance care planning  6/13/2023:  - chart and interval history reviewed  - discussed with bedside nurse extensively  - patient seen at bedside  - patient was somnolent and unable to respond to any verbal or physical stimuli. She is unable to currently make medical decisions for herself given the severity of her condition  - yesterday had called and updated her son Romain after being unable to get in touch with Augie  - this morning Romain had talked with the bedside nurse and made her aware he will be in tomorrow as it is a long drive from  to [place]  - today tried again to reach out to Augie and this time he picked up  - introduced self and role of palliative care to Augie   - Augie has been updated regarding the on tomi by his brother Romain already  - discussed with Augie what his understanding was of her medical on tomi and talked through what initially brought her into the hospital and how her hospital course has progressed with her recent decline and need for ICU. Shared with him her overall severity. He was not surprised to hear this.   - updated him regarding the conversations we had with her regarding encouraging her to call her sons, but she always states she does not wish to bother them with her stuff, but she is no longer able to make her own decisions and she had named them her HCPOAs.   - Augie shared that he is going through a lot of his own medical stuff and will not be able to make the trip out there, but will be in constant touch with his brother Romain  - Augie shared that he would like Romain to be a  that is talked to and have Romain update him rather than us calling Augie directly. he stated he is going through a lot  -  "wishes him all the best on his journey and made him aware not to hesitate to reach out if needed or if any questions  - attempted to call Romain x 3 to update him but there was no answer, he may be on the road on the way in to [place]  [] DNR/DNI   [] continue with current management with limits to invasive intervention  [] son Romain will be point person per Augie request and Romain will update Augie as needed. Romain is encoute to [place] from  and anticipates being in tomorrow per his conversation with nursing this morning.     Other  Debility  - Mainly bed/wheelchair bound  - Transfers herself in/out of wheelchair  - plan was for SNF prior to this decompensation         I will follow-up with patient. Please contact us if you have any additional questions.    Subjective:     Chief Complaint:   Chief Complaint   Patient presents with    Leg Pain     Pt presents with bilateral leg pain x 16 hours. Pt denies recent trauma/injury.        HPI:   Per H&P: " The patient is a 72 y.o. female with a past medical history of Chronic obstructive pulmonary disease, Hyperlipidemia, Iron deficiency anemia, and Stage III CKD who presents for evaluation of bilateral leg pain. She reports she has been wheelchair bound for a year and states that she has had increased difficulty caring for herself over the last week. She reports she has been unable to transfer herself to her bed or to the toilet so she has been using a diaper. She called EMS today after she slid out of her wheelchair and was unable to get back into it without assistance. The patient reports she has been taking hydrocodone for the pain.  On initial workup, the patient is noted to have a low Na of 128 and appears disheveled.  The patient states she came to the hospital because she can't take care of herself anymore and would like placement for assistance."    Palliative care consulted for assistance with advanced care planning and support.       Hospital Course:  No " notes on file      Medications:  Continuous Infusions:   dexmedeTOMIDine (Precedex) infusion (titrating) 0.8 mcg/kg/hr (06/13/23 0451)     Scheduled Meds:   albuterol-ipratropium  3 mL Nebulization Q6H    ascorbic acid (vitamin C)  250 mg Oral Daily    atorvastatin  40 mg Oral Daily    busPIRone  10 mg Oral BID    cyanocobalamin  1,000 mcg Oral Daily    diltiaZEM  180 mg Oral Daily    DULoxetine  60 mg Oral Daily    ferrous sulfate  1 tablet Oral Daily    fluticasone furoate-vilanteroL  1 puff Inhalation Daily    heparin (porcine)  5,000 Units Subcutaneous Q8H    magnesium oxide  400 mg Oral BID    metoprolol succinate  50 mg Oral Daily    miconazole nitrate 2%   Topical (Top) BID    multivitamin  1 tablet Oral Daily    mupirocin   Nasal BID    nicotine  1 patch Transdermal Daily    torsemide  20 mg Oral Daily    vitamin D  1,000 Units Oral Daily     PRN Meds:acetaminophen, albuterol-ipratropium, hydrALAZINE, hydrOXYzine HCL, lorazepam, morphine, naloxone, ondansetron, sodium chloride 0.9%, sodium chloride 3%    Objective:     Vital Signs (Most Recent):  Temp: 98.2 °F (36.8 °C) (06/13/23 0705)  Pulse: 107 (06/13/23 0930)  Resp: (!) 41 (06/13/23 0930)  BP: 127/68 (06/13/23 0930)  SpO2: (!) 92 % (06/13/23 0930) Vital Signs (24h Range):  Temp:  [97.1 °F (36.2 °C)-99.4 °F (37.4 °C)] 98.2 °F (36.8 °C)  Pulse:  [] 107  Resp:  [20-50] 41  SpO2:  [87 %-98 %] 92 %  BP: ()/() 127/68     Weight: 88.5 kg (195 lb 1.7 oz)  Body mass index is 31.51 kg/m².       Physical Exam  Vitals and nursing note reviewed.   Constitutional:       Appearance: She is ill-appearing.      Comments: Somnolent and unable to respond to verbal/physical stimuli   HENT:      Head: Normocephalic and atraumatic.   Eyes:      Comments: Eyes closed during visit   Cardiovascular:      Rate and Rhythm: Normal rate.   Pulmonary:      Comments: On HFNC. Increased work of breathing at times  Skin:     General: Skin is warm.   Neurological:       Comments: Somnolent and unable to respond to verbal/physical stimuli            Review of Symptoms        Living Arrangements:  Lives alone    Psychosocial/Cultural:   See Palliative Psychosocial Note: Yes  - lives alone at home  - has a friend (Zafar) that comes and checks on her  - mainly in wheelchair/ bed bound   - enjoys staying home and watching tv  - 2 sons who live in   **Primary  to Follow**  Palliative Care  Consult: No      Advance Care Planning   Advance Directives:   [place]POST: Yes    Do Not Resuscitate Status: Yes    Medical Power of : Yes      Decision Making:  Patient unable to communicate due to disease severity/cognitive impairment  Goals of Care: When patient was able she made clear what is most important right now is to focus on remaining as independent as possible, improvement in condition but with limits to invasive therapies. Accordingly, we have decided that the best plan to meet the patient's goals includes continuing with treatment.       Significant Labs: reviewed  CBC:   Recent Labs   Lab 06/13/23  0551   WBC 11.22   HGB 10.5*   HCT 30.9*   MCV 95        BMP:  Recent Labs   Lab 06/13/23  0418   GLU 92   *   K 4.3   CL 94*   CO2 28   BUN 21   CREATININE 0.9   CALCIUM 8.1*   MG 1.8     LFT:  Lab Results   Component Value Date    AST 46 (H) 06/13/2023    ALKPHOS 133 06/13/2023    BILITOT 0.3 06/13/2023     Albumin:   Albumin   Date Value Ref Range Status   06/13/2023 1.7 (L) 3.5 - 5.2 g/dL Final     Protein:   Total Protein   Date Value Ref Range Status   06/13/2023 5.3 (L) 6.0 - 8.4 g/dL Final     Lactic acid:   Lab Results   Component Value Date    LACTATE 1.1 02/13/2023    LACTATE 0.7 01/17/2023       Significant Imaging: reviewed    > 50% of 35 min visit spent in chart review, face to face discussion of symptom assessment, coordination of care with other specialists, and d/c planning  16 minutes ACP time spent: goals of care, emotional  support, formulating and communication prognosis and goals of care, exploring burden/ benefit of various approaches of treatment.       Lucho Gutierrez MD  Palliative Medicine  [facility] - Intensive Care ([place])

## 2023-06-13 NOTE — PT/OT/SLP PROGRESS
Physical Therapy      Patient Name:  Nalini Varma   MRN:  [id number]    Patient not seen today secondary to orders unreconciled / Therapist assessment (pt not medically stable enough for therapy session). Will follow-up as schedule allows and as pt is available / appropriate for therapy services.

## 2023-06-13 NOTE — ASSESSMENT & PLAN NOTE
- Noted history; EF 60% and has Grade III left ventricular diastolic dysfunction.  - diuresis per primary team and pccm  - likely needs pablo for accurate measurement, minimal urine output with purewick per bedside nurse

## 2023-06-13 NOTE — ASSESSMENT & PLAN NOTE
Discussed with oncologist (Dr. Gilmore)  Patient diagnosed without biopsy but was presented at tumor board  Unable to tolerate more than a few sessions of chemotherapy/XRT  Although not considered stage IV she should quality for hospice as no further treatment is recommended currently.

## 2023-06-13 NOTE — ASSESSMENT & PLAN NOTE
Patient is identified as having Diastolic (HFpEF) heart failure that is Chronic. CHF is currently controlled. Latest ECHO performed and demonstrates- Results for orders placed during the hospital encounter of 02/13/23    Echo    Interpretation Summary  · The left ventricle is normal in size with concentric remodeling and normal systolic function.  · Mild left atrial enlargement.  · The estimated ejection fraction is 60%.  · Grade III left ventricular diastolic dysfunction.  · Normal right ventricular size with normal right ventricular systolic function.  · There is mild aortic valve stenosis.  · Aortic valve area is 1.50 cm2; peak velocity is 3.06 m/s; mean gradient is 22 mmHg.  · Mild tricuspid regurgitation.  . Continue Beta Blocker and ACE/ARB and monitor clinical status closely. Monitor on telemetry. Patient is off CHF pathway.  Monitor strict Is&Os and daily weights.  Place on fluid restriction of 1.5 L.  on diet for CHF. Last BNP reviewed- and noted below   No results for input(s): BNP, BNPTRIAGEBLO in the last 168 hours..    Per acute resp failure

## 2023-06-13 NOTE — ASSESSMENT & PLAN NOTE
- hx chf and copd  - interval hx: decompensated over weekend with increased SOB due to white out of L lung on imaging. Transferred to ICU and now on HFNC.   - PCCM focusing on diuresis and weaning O2 requirements as able  - cxray 6/13 showing:  There has been significant improvement in the left hemithorax with re-expansion although there is still significant loss of volume and pleural fluid are of left lung base.  The right lung has worsened with a diffuse alveolar process throughout the right lung field that is more extensive than on previous radiographs.  - called and updated her son Augie, please refer to conversation in ACP for further details

## 2023-06-13 NOTE — ASSESSMENT & PLAN NOTE
Rapid response 6/11/23: Patient was on venturi mask 40% (8L) and desaturated to 50-60% this evening. Patient appeared cold and cyanotic but alert. Dr. Austin was present. Lung auscultation revealed diminished air entry to bilateral lungs, L>R. Given IV morphine 2mg x1 .Patient was placed on high flow 12L, which saw Pox improving to 83%. Patient reports feeling better, color returned to face. Given acute desaturation requiring Vapotherm support, to transfer to ICU for monitoring tonight.      Plan:  Whiteout left lung, afebrile without leukocytosis. H/O CHF and COPD and recurrent lung CA on recent chem/rads late 2022  Continue oxygen support per pulm  Continue IV lasix for diuresis  Will discuss with palliative  Repeat CXR  Increased frequency of IV morphine 4mg Q6 PRN to Q4 PRN for dyspnea

## 2023-06-13 NOTE — ASSESSMENT & PLAN NOTE
Patient with increasing weakness, appearance of inability to care for herself.  Reports becoming bedbound and now inability to transfer in and out of her wheelchair.    Needs SNF placement and likely would benefit from skilled nursing NH but she is not sure she's ready to make that step. Has 1 friend she relies on to essentially do most of her care that is not simple ADLs.    Ok for discharge to SNF

## 2023-06-13 NOTE — SUBJECTIVE & OBJECTIVE
Interval History: Patient not conversant this AM.  She is on HFNC.  Not tolerating CPAP/BiPAP.  Requiring sedation for agitation on and off.    Review of Systems   Unable to perform ROS: Acuity of condition   Objective:     Vital Signs (Most Recent):  Temp: 98.1 °F (36.7 °C) (06/13/23 1600)  Pulse: (!) 111 (06/13/23 1600)  Resp: (!) 29 (06/13/23 1630)  BP: (!) 150/74 (06/13/23 1600)  SpO2: (!) 94 % (06/13/23 1600) Vital Signs (24h Range):  Temp:  [97 °F (36.1 °C)-99.4 °F (37.4 °C)] 98.1 °F (36.7 °C)  Pulse:  [] 111  Resp:  [18-43] 29  SpO2:  [87 %-96 %] 94 %  BP: ()/() 150/74     Weight: 88.5 kg (195 lb 1.7 oz)  Body mass index is 31.51 kg/m².    Intake/Output Summary (Last 24 hours) at 6/13/2023 1813  Last data filed at 6/13/2023 0705  Gross per 24 hour   Intake --   Output 0 ml   Net 0 ml         Physical Exam  Constitutional:       Comments: Lethargic, ill-appearing, mumbling   Cardiovascular:      Rate and Rhythm: Normal rate and regular rhythm.      Heart sounds: Normal heart sounds. No murmur heard.    No gallop.   Pulmonary:      Effort: Pulmonary effort is normal.      Breath sounds: Rhonchi and rales present.      Comments: Dyspneic with increased WOB, pursed lip exhalation noted, tachypnea. Decreased breath sounds on left.   Abdominal:      General: Bowel sounds are normal.      Palpations: Abdomen is soft.   Skin:     General: Skin is warm and dry.           Significant Labs: All pertinent labs within the past 24 hours have been reviewed.    Significant Imaging: I have reviewed all pertinent imaging results/findings within the past 24 hours.

## 2023-06-13 NOTE — PROGRESS NOTES
O2 Device/Concentration: Flow (L/min): 40, Oxygen Concentration (%): 100,  , Flow (L/min): 40    Plan of Care:

## 2023-06-13 NOTE — PROGRESS NOTES
Progress Note  Pulmonary & Critical Care Medicine    Attending: Wilfrid Aguilera  Fellow: Aysha Moss  Admit Date: 6/4/2023  Today's Date: 06/13/2023      SUBJECTIVE:     Stable. Stated her name this AM. Respiratory status appears improved.    OBJECTIVE:     Vital Signs Trends/Hx Reviewed  Vitals:    06/13/23 0845 06/13/23 0900 06/13/23 0915 06/13/23 0930   BP: (!) 146/79 (!) 160/78 (!) 154/91 127/68   Patient Position:       Pulse: 107 107 110 107   Resp: (!) 40 (!) 35 (!) 25 (!) 41   Temp:       TempSrc:       SpO2: (!) 91% (!) 87% (!) 89% (!) 92%   Weight:       Height:           Ventilator settings:   No data recorded  Oxygen Concentration (%):  [] 80        Physical Exam:  General: Laying in bed asleep in NAD. Very lethargic.  HEENT: AT/NC, PERRL, EOMI, oral and nasal mucosa moist. NC in place.  Neck: Supple without JVD or palpable LAD.   Cardiac: normal rate, regular rhythm, with no MRG with brisk cap refill and symmetric pulses in distal extremities.  Respiratory: Normal inspection. Symmetric chest rise. Normal palpation and percussion. Auscultation clear right lung. Improved left breath sounds. Mild increased work of breathing noted.   Abdomen: Soft, NT/ND. +BS. No hepatosplenomegaly.   Extremities: No edema.   Neuro: Very lethargic but arousable.       Laboratory:  No results for input(s): PH, PCO2, PO2, HCO3, POCSATURATED, BE in the last 24 hours.    Recent Labs   Lab 06/13/23  0551   WBC 11.22   RBC 3.26*   HGB 10.5*   HCT 30.9*      MCV 95   MCH 32.2*   MCHC 34.0     Recent Labs   Lab 06/13/23  0418   *   K 4.3   CL 94*   CO2 28   BUN 21   CREATININE 0.9   MG 1.8       Microbiology Data:   Microbiology Results (last 7 days)       Procedure Component Value Units Date/Time    Urine culture [[id number]]  (Abnormal)  (Susceptibility) Collected: 06/05/23 0216    Order Status: Completed Specimen: Urine Updated: 06/07/23 1926     Urine Culture, Routine KLEBSIELLA PNEUMONIAE  >100,000  cfu/ml  No other significant isolate      Narrative:      Specimen Source->Urine             Chest Imaging:   X-Ray Chest 1 View    Result Date: 6/12/2023  No significant interval changes.  See above discussion. This report was flagged in Epic as abnormal. Electronically signed by: Jam Jimenez Date:    06/12/2023 Time:    07:40        Infusions:     dexmedeTOMIDine (Precedex) infusion (titrating) 0.2 mcg/kg/hr (06/13/23 1137)       Scheduled Medications:    albuterol-ipratropium  3 mL Nebulization Q6H    ascorbic acid (vitamin C)  250 mg Oral Daily    atorvastatin  40 mg Oral Daily    busPIRone  10 mg Oral BID    cyanocobalamin  1,000 mcg Oral Daily    diltiaZEM  180 mg Oral Daily    DULoxetine  60 mg Oral Daily    ferrous sulfate  1 tablet Oral Daily    fluticasone furoate-vilanteroL  1 puff Inhalation Daily    heparin (porcine)  5,000 Units Subcutaneous Q8H    magnesium oxide  400 mg Oral BID    metoprolol succinate  50 mg Oral Daily    miconazole nitrate 2%   Topical (Top) BID    multivitamin  1 tablet Oral Daily    mupirocin   Nasal BID    nicotine  1 patch Transdermal Daily    torsemide  20 mg Oral Daily    vitamin D  1,000 Units Oral Daily       PRN Medications:   acetaminophen, albuterol-ipratropium, hydrALAZINE, hydrOXYzine HCL, lorazepam, morphine, naloxone, ondansetron, sodium chloride 0.9%, sodium chloride 3%    Problem List:   Patient Active Problem List   Diagnosis    Chronic obstructive pulmonary disease    Opioid dependence    Essential hypertension    Iron deficiency anemia    Tobacco dependence    Hyperlipidemia    Osteoporosis    Generalized anxiety disorder    Hypomagnesemia    Pulmonary nodule    Diverticulosis    Hyperkalemia    Chronic diastolic (congestive) heart failure    History of L3 compression fracture    Vitamin D deficiency    Vitamin B12 deficiency    Chronic pain syndrome    History of lung cancer    Chronic GI bleeding    History of alcohol abuse    Choledocholithiasis    Elevated  LFTs    Urinary retention    Cervical spinal stenosis    Debility    Hypocalcemia    Spinal stenosis    Multiple thyroid nodules    CKD (chronic kidney disease) stage 3, GFR 30-59 ml/min    MDD (major depressive disorder), recurrent, in partial remission    PTSD (post-traumatic stress disorder)    Aortic atherosclerosis    Iron deficiency anemia due to chronic blood loss    Syndrome of inappropriate secretion of antidiuretic hormone    Other nonthrombocytopenic purpura    Disorder of joint prosthesis    Secondary malignant neoplasm of intrathoracic lymph nodes    Recurrent adenocarcinoma of left lung    Shock, unspecified    Advance care planning    Anemia of chronic disease    Electrolyte abnormality    Pleural effusion    Myelopathy in diseases classified elsewhere    Diarrhea    Nonrheumatic aortic valve stenosis    Somnolence    Acute cystitis without hematuria    Acute hypoxemic respiratory failure       ASSESSMENT & RECOMMENDATIONS     Neuro  #Agitation  -Precedex gtt overnight.  -Will attempt to back off of sedation as tolerated.    Cardiovascular  #HFpEF  -2/13/23 TTE- LVEF 60%, G3DD  -Continue GDMT w/ torsemide  -Intermittent doses of lasix    #HTN  -Hypertensive episodes noted since admission.  -Continue current BP medication with PRNs    #PH  -WHO Group II  -Continue Torsemide    #HLD  -Continue statin    Pulmonary  #Acute Hypoxic Respiratory Failure  -Currently on 40L 70%. Targeting O2 sat 88-92%  -CXR reveals improved whiteout of left lung  -Bedside US shows small bilateral pleural effusions. Not big enough for thoracentesis.  -Believe whiteout to be 2/2 atelectasis vs mucus plugging.  -Continue pulmonary toilet with RT. 3% saline nebs ordered to facilitate.     #COPD  -Continue inhaler and duoneb treatment.    GI  -No acute issues at this time.    Renal   -No acute issues at this time    ID  #UTI  -Treated with 3 days ceftriaxone    Heme/Onc  #Normocytic Anemia  -montior    Endo  -No acute issues at  this time    Rheum  -No acute issues at this time    Thank you for allowing us to participate in the care of this patient. We will continue to follow. Please call with questions.    Aysha Moss M.D., PGY-V  [facility] Pulmonary/Critical Care Fellow

## 2023-06-13 NOTE — EICU
eICU Intevention     Notified of increased work of breathing  On Precedex  Reportedly did well on prn morphine 4 mg IV    Ordered morphine 4 mg IV q 6 prn for dyspnea. Hold if patient too somnolent

## 2023-06-13 NOTE — ASSESSMENT & PLAN NOTE
6/13/2023:  - chart and interval history reviewed  - discussed with bedside nurse extensively  - patient seen at bedside  - patient was somnolent and unable to respond to any verbal or physical stimuli. She is unable to currently make medical decisions for herself given the severity of her condition  - yesterday had called and updated her son Romain after being unable to get in touch with Augie  - this morning Romain had talked with the bedside nurse and made her aware he will be in tomorrow as it is a long drive from  to [place]  - today tried again to reach out to Augie and this time he picked up  - introduced self and role of palliative care to Augie   - Augie has been updated regarding the on tomi by his brother Romain already  - discussed with Augie what his understanding was of her medical on tomi and talked through what initially brought her into the hospital and how her hospital course has progressed with her recent decline and need for ICU. Shared with him her overall severity. He was not surprised to hear this.   - updated him regarding the conversations we had with her regarding encouraging her to call her sons, but she always states she does not wish to bother them with her stuff, but she is no longer able to make her own decisions and she had named them her HCPOAs.   - Augie shared that he is going through a lot of his own medical stuff and will not be able to make the trip out there, but will be in constant touch with his brother Romain  - Augie shared that he would like Romain to be a  that is talked to and have Romain update him rather than us calling Augie directly. he stated he is going through a lot  - wishes him all the best on his journey and made him aware not to hesitate to reach out if needed or if any questions  - attempted to call Romain x 3 to update him but there was no answer, he may be on the road on the way in to [place]  [] DNR/DNI   [] continue with current management with limits  to invasive intervention  [] son Romain will be point person per Augie request and Romain will update Augie as needed. Romain is encoute to [place] from  and anticipates being in tomorrow per his conversation with nursing this morning.

## 2023-06-13 NOTE — PROGRESS NOTES
"[facility] Intensive Care ([place])  Adult Nutrition  Progress Note    SUMMARY       Recommendations  1) D/t overall severity of patient condition, nutrition recommendations not appropriate at this time. RD will continue to follow. Please consult if alternate recommendations are needed    Goals: Patient to have diet re-initiated per medical status  Nutrition Goal Status: new  Communication of RD Recs: reviewed with RN (Cleopatra)    Assessment and Plan  No nutrition diagnosis at this time.     Reason for Assessment  Reason For Assessment: RD follow-up  Diagnosis: pulmonary disease, other (see comments) (Acute hypoxemic respiratory failure)  Relevant Medical History: Chronic obstructive pulmonary disease, HTN, tobacco dependence, hyperlipidemia, chronic diastolic heart failure, hyponatremia, CKD stage 3  Interdisciplinary Rounds: did not attend  General Information Comments: Patient admitted for debility. Patient is wheelchair bound. Bananas noted on allergies. Limited assessment d/t patient being somnolent and unable to respond to verbal/physical stimuli. ALTHEA NFPE d/t patient being critically ill and sedated. Asad score: 14; moderate risk for pressure injury;  altered skin integrity right medial foot, buttocks, right posterior thigh, posterior and anterior incontinence associated dermatitis.  Nutrition Discharge Planning: dc TBD    Nutrition Risk Screen  Nutrition Risk Screen: other (see comments) (sedated)    Nutrition/Diet History  Spiritual, Cultural Beliefs, Sikhism Practices, Values that Affect Care: no  Food Allergies: other (see comments) (Bananas)  Factors Affecting Nutritional Intake: NPO, socio-economic, impaired cognitive status/motor control    Anthropometrics  Temp: 97 °F (36.1 °C)  Height: 5' 5.98" (167.6 cm)  Height (inches): 65.98 in  Weight Method: Standard Scale  Weight: 88.5 kg (195 lb 1.7 oz)  Weight (lb): 195.11 lb  Ideal Body Weight (IBW), Female: 129.9 lb  % Ideal Body Weight, Female (lb): " 150.2 %  BMI (Calculated): 31.5  BMI Grade: 30 - 34.9- obesity - grade I       Lab/Procedures/Meds  Pertinent Labs Reviewed: reviewed  CBC:  Recent Labs   Lab 06/13/23  0551   WBC 11.22   HGB 10.5*   HCT 30.9*        CMP:  Recent Labs   Lab 06/13/23  0418   CALCIUM 8.1*   ALBUMIN 1.7*   PROT 5.3*   *   K 4.3   CO2 28   CL 94*   BUN 21   CREATININE 0.9   ALKPHOS 133   ALT 19   AST 46*   BILITOT 0.3       Pertinent Medications Reviewed: reviewed  Scheduled Meds:   albuterol-ipratropium  3 mL Nebulization Q6H    ascorbic acid (vitamin C)  250 mg Oral Daily    atorvastatin  40 mg Oral Daily    busPIRone  10 mg Oral BID    cyanocobalamin  1,000 mcg Oral Daily    diltiaZEM  180 mg Oral Daily    DULoxetine  60 mg Oral Daily    ferrous sulfate  1 tablet Oral Daily    fluticasone furoate-vilanteroL  1 puff Inhalation Daily    heparin (porcine)  5,000 Units Subcutaneous Q8H    magnesium oxide  400 mg Oral BID    metoprolol succinate  50 mg Oral Daily    miconazole nitrate 2%   Topical (Top) BID    multivitamin  1 tablet Oral Daily    mupirocin   Nasal BID    nicotine  1 patch Transdermal Daily    torsemide  20 mg Oral Daily    vitamin D  1,000 Units Oral Daily     Continuous Infusions:   dexmedeTOMIDine (Precedex) infusion (titrating) Stopped (06/13/23 1147)     PRN Meds:.acetaminophen, albuterol-ipratropium, hydrALAZINE, hydrOXYzine HCL, lorazepam, morphine, naloxone, ondansetron, sodium chloride 0.9%, sodium chloride 3%    Estimated/Assessed Needs  Weight Used For Calorie Calculations: 88.5 kg (195 lb 1.7 oz)  Energy Calorie Requirements (kcal): 3360-4881  Energy Need Method: Durham-St Jeor (1.1-1.3 kcal/kg)  Protein Requirements: 59-77g (1.0-1.3 g/kg)  Weight Used For Protein Calculations: 59.1 kg (130 lb 2.9 oz)  Fluid Requirements (mL): 1 mL/kcal  Estimated Fluid Requirement Method:  (or per MD)  RDA Method (mL): 1547  CHO Requirement: 193g    Nutrition Prescription Ordered  Current Diet Order:  NPO    Evaluation of Received Nutrient/Fluid Intake  I/O: 185/150  Energy Calories Required: not meeting needs  Protein Required: not meeting needs  Fluid Required: not meeting needs  Comments: LBM: 6/10/23  Tolerance: not tolerating  % Intake of Estimated Energy Needs: 0 - 25 %  % Meal Intake: NPO    Nutrition Risk  Level of Risk/Frequency of Follow-up: high     Monitor and Evaluation  Food and Nutrient Intake: energy intake, food and beverage intake  Food and Nutrient Adminstration: diet order  Physical Activity and Function: factors affecting access to physical activity, nutrition-related ADLs and IADLs  Anthropometric Measurements: weight, weight change, body mass index  Biochemical Data, Medical Tests and Procedures: lipid profile, inflammatory profile, glucose/endocrine profile, gastrointestinal profile, electrolyte and renal panel  Nutrition-Focused Physical Findings: skin, head and eyes, extremities, muscles and bones, overall appearance     Nutrition Follow-Up  RD Follow-up?: Yes  Analia Grimes, student dietitian  Spring Wilson, RDN, LDN

## 2023-06-13 NOTE — ASSESSMENT & PLAN NOTE
- Per Dr. Gilmore during this admission, patient has completed her course of treatment and will need follow up with oncology outpatient on discharge

## 2023-06-13 NOTE — ASSESSMENT & PLAN NOTE
Temp Readings from Last 3 Encounters:   06/13/23 98.1 °F (36.7 °C) (Axillary)   03/13/23 98.1 °F (36.7 °C)   03/06/23 98.3 °F (36.8 °C) (Oral)     Lab Results   Component Value Date    WBC 11.22 06/13/2023     No results for input(s): LACTATE in the last 72 hours.    Has completed course of IV Rocephin    Cultures were taken-   Microbiology Results (last 7 days)     Procedure Component Value Units Date/Time    Urine culture [[id number]]  (Abnormal)  (Susceptibility) Collected: 06/05/23 0216    Order Status: Completed Specimen: Urine Updated: 06/07/23 1926     Urine Culture, Routine KLEBSIELLA PNEUMONIAE  >100,000 cfu/ml  No other significant isolate      Narrative:      Specimen Source->Urine

## 2023-06-13 NOTE — PLAN OF CARE
Recommendations  1) D/t overall severity of patient condition, nutrition recommendations not appropriate at this time. RD will continue to follow. Please consult if alternate recommendations are needed     Goals: Patient to have diet re-initiated per medical status  Nutrition Goal Status: new  Communication of RD Recs: reviewed with RN (Cleopatra)

## 2023-06-13 NOTE — SUBJECTIVE & OBJECTIVE
Medications:  Continuous Infusions:   dexmedeTOMIDine (Precedex) infusion (titrating) 0.8 mcg/kg/hr (06/13/23 0451)     Scheduled Meds:   albuterol-ipratropium  3 mL Nebulization Q6H    ascorbic acid (vitamin C)  250 mg Oral Daily    atorvastatin  40 mg Oral Daily    busPIRone  10 mg Oral BID    cyanocobalamin  1,000 mcg Oral Daily    diltiaZEM  180 mg Oral Daily    DULoxetine  60 mg Oral Daily    ferrous sulfate  1 tablet Oral Daily    fluticasone furoate-vilanteroL  1 puff Inhalation Daily    heparin (porcine)  5,000 Units Subcutaneous Q8H    magnesium oxide  400 mg Oral BID    metoprolol succinate  50 mg Oral Daily    miconazole nitrate 2%   Topical (Top) BID    multivitamin  1 tablet Oral Daily    mupirocin   Nasal BID    nicotine  1 patch Transdermal Daily    torsemide  20 mg Oral Daily    vitamin D  1,000 Units Oral Daily     PRN Meds:acetaminophen, albuterol-ipratropium, hydrALAZINE, hydrOXYzine HCL, lorazepam, morphine, naloxone, ondansetron, sodium chloride 0.9%, sodium chloride 3%    Objective:     Vital Signs (Most Recent):  Temp: 98.2 °F (36.8 °C) (06/13/23 0705)  Pulse: 107 (06/13/23 0930)  Resp: (!) 41 (06/13/23 0930)  BP: 127/68 (06/13/23 0930)  SpO2: (!) 92 % (06/13/23 0930) Vital Signs (24h Range):  Temp:  [97.1 °F (36.2 °C)-99.4 °F (37.4 °C)] 98.2 °F (36.8 °C)  Pulse:  [] 107  Resp:  [20-50] 41  SpO2:  [87 %-98 %] 92 %  BP: ()/() 127/68     Weight: 88.5 kg (195 lb 1.7 oz)  Body mass index is 31.51 kg/m².       Physical Exam  Vitals and nursing note reviewed.   Constitutional:       Appearance: She is ill-appearing.      Comments: Somnolent and unable to respond to verbal/physical stimuli   HENT:      Head: Normocephalic and atraumatic.   Eyes:      Comments: Eyes closed during visit   Cardiovascular:      Rate and Rhythm: Normal rate.   Pulmonary:      Comments: On HFNC. Increased work of breathing at times  Skin:     General: Skin is warm.   Neurological:      Comments:  Somnolent and unable to respond to verbal/physical stimuli            Review of Symptoms        Living Arrangements:  Lives alone    Psychosocial/Cultural:   See Palliative Psychosocial Note: Yes  - lives alone at home  - has a friend (Zafar) that comes and checks on her  - mainly in wheelchair/ bed bound   - enjoys staying home and watching tv  - 2 sons who live in   **Primary  to Follow**  Palliative Care  Consult: No      Advance Care Planning   Advance Directives:   [place]: Yes    Do Not Resuscitate Status: Yes    Medical Power of : Yes      Decision Making:  Patient unable to communicate due to disease severity/cognitive impairment  Goals of Care: When patient was able she made clear what is most important right now is to focus on remaining as independent as possible, improvement in condition but with limits to invasive therapies. Accordingly, we have decided that the best plan to meet the patient's goals includes continuing with treatment.       Significant Labs: reviewed  CBC:   Recent Labs   Lab 06/13/23  0551   WBC 11.22   HGB 10.5*   HCT 30.9*   MCV 95        BMP:  Recent Labs   Lab 06/13/23  0418   GLU 92   *   K 4.3   CL 94*   CO2 28   BUN 21   CREATININE 0.9   CALCIUM 8.1*   MG 1.8     LFT:  Lab Results   Component Value Date    AST 46 (H) 06/13/2023    ALKPHOS 133 06/13/2023    BILITOT 0.3 06/13/2023     Albumin:   Albumin   Date Value Ref Range Status   06/13/2023 1.7 (L) 3.5 - 5.2 g/dL Final     Protein:   Total Protein   Date Value Ref Range Status   06/13/2023 5.3 (L) 6.0 - 8.4 g/dL Final     Lactic acid:   Lab Results   Component Value Date    LACTATE 1.1 02/13/2023    LACTATE 0.7 01/17/2023       Significant Imaging: reviewed

## 2023-06-13 NOTE — PROGRESS NOTES
"[facility] Intensive Care [facility] Medicine  Progress Note    Patient Name: Nalini Varma  MRN: [id number]  Patient Class: IP- Inpatient   Admission Date: 6/4/2023  Length of Stay: 8 days  Attending Physician: Breana Mckinney MD  Primary Care Provider: Yane Sahni MD        Subjective:     Principal Problem:Acute hypoxemic respiratory failure        HPI:  From H&P by Emigdio Holly NP:  "The patient is a 72 y.o. female with a past medical history of Chronic obstructive pulmonary disease, Hyperlipidemia, Iron deficiency anemia, and Stage III CKD who presents for evaluation of bilateral leg pain. She reports she has been wheelchair bound for a year and states that she has had increased difficulty caring for herself over the last week. She reports she has been unable to transfer herself to her bed or to the toilet so she has been using a diaper. She called EMS today after she slid out of her wheelchair and was unable to get back into it without assistance. The patient reports she has been taking hydrocodone for the pain.  On initial workup, the patient is noted to have a low Na of 128 and appears disheveled.  The patient states she came to the hospital because she can't take care of herself anymore and would like placement for assistance."      Overview/Hospital Course:  Patient was admitted with UTI and failure to thrive and was treated for such. Improved and was stable for discharge, prolonged waiting for placement and the patient was placed on the virtual medicine team. 6/11 rapid response called for severe hypoxia and AMS and cxr revealed a white out left lung with pulmonary edema of the right lung. She was placed on high flow oxygen and placed in the ICU. She has had severe agitation and disorientation and did not tolerate bipap or even face mask. She received IV diuresis and monitoring in the ICU. Palliative care was reconsulted.      Interval History: Patient not conversant this AM.  She " is on HFNC.  Not tolerating CPAP/BiPAP.  Requiring sedation for agitation on and off.    Review of Systems   Unable to perform ROS: Acuity of condition   Objective:     Vital Signs (Most Recent):  Temp: 98.1 °F (36.7 °C) (06/13/23 1600)  Pulse: (!) 111 (06/13/23 1600)  Resp: (!) 29 (06/13/23 1630)  BP: (!) 150/74 (06/13/23 1600)  SpO2: (!) 94 % (06/13/23 1600) Vital Signs (24h Range):  Temp:  [97 °F (36.1 °C)-99.4 °F (37.4 °C)] 98.1 °F (36.7 °C)  Pulse:  [] 111  Resp:  [18-43] 29  SpO2:  [87 %-96 %] 94 %  BP: ()/() 150/74     Weight: 88.5 kg (195 lb 1.7 oz)  Body mass index is 31.51 kg/m².    Intake/Output Summary (Last 24 hours) at 6/13/2023 1813  Last data filed at 6/13/2023 0705  Gross per 24 hour   Intake --   Output 0 ml   Net 0 ml         Physical Exam  Constitutional:       Comments: Lethargic, ill-appearing, mumbling   Cardiovascular:      Rate and Rhythm: Normal rate and regular rhythm.      Heart sounds: Normal heart sounds. No murmur heard.    No gallop.   Pulmonary:      Effort: Pulmonary effort is normal.      Breath sounds: Rhonchi and rales present.      Comments: Dyspneic with increased WOB, pursed lip exhalation noted, tachypnea. Decreased breath sounds on left.   Abdominal:      General: Bowel sounds are normal.      Palpations: Abdomen is soft.   Skin:     General: Skin is warm and dry.           Significant Labs: All pertinent labs within the past 24 hours have been reviewed.    Significant Imaging: I have reviewed all pertinent imaging results/findings within the past 24 hours.      Assessment/Plan:      * Acute hypoxemic respiratory failure  Rapid response 6/11/23: Patient was on venturi mask 40% (8L) and desaturated to 50-60% this evening. Patient appeared cold and cyanotic but alert. Dr. Austin was present. Lung auscultation revealed diminished air entry to bilateral lungs, L>R. Given IV morphine 2mg x1 .Patient was placed on high flow 12L, which saw Pox improving to 83%.  Patient reports feeling better, color returned to face. Given acute desaturation requiring Vapotherm support, to transfer to ICU for monitoring tonight.      Plan:  Whiteout left lung, afebrile without leukocytosis. H/O CHF and COPD and recurrent lung CA on recent chem/rads late 2022  Continue oxygen support per pulm  Continue IV lasix for diuresis  Will discuss with palliative  Repeat CXR  Increased frequency of IV morphine 4mg Q6 PRN to Q4 PRN for dyspnea    Acute cystitis without hematuria  Temp Readings from Last 3 Encounters:   06/13/23 98.1 °F (36.7 °C) (Axillary)   03/13/23 98.1 °F (36.7 °C)   03/06/23 98.3 °F (36.8 °C) (Oral)     Lab Results   Component Value Date    WBC 11.22 06/13/2023     No results for input(s): LACTATE in the last 72 hours.    Has completed course of IV Rocephin    Cultures were taken-   Microbiology Results (last 7 days)     Procedure Component Value Units Date/Time    Urine culture [[id number]]  (Abnormal)  (Susceptibility) Collected: 06/05/23 0216    Order Status: Completed Specimen: Urine Updated: 06/07/23 1926     Urine Culture, Routine KLEBSIELLA PNEUMONIAE  >100,000 cfu/ml  No other significant isolate      Narrative:      Specimen Source->Urine          Somnolence  resolved    Xanax, gabapentin, norco, trazodone and promethazine on her home med list. Don't think this polypharmacy is helping her to improve her functional status if that is truly her goal. She has been off these meds since arrival without complaint as of yet.    Recurrent adenocarcinoma of left lung  Discussed with oncologist (Dr. Gilmore)  Patient diagnosed without biopsy but was presented at tumor board  Unable to tolerate more than a few sessions of chemotherapy/XRT  Although not considered stage IV she should quality for hospice as no further treatment is recommended currently.      Syndrome of inappropriate secretion of antidiuretic hormone  History of SIADH, monitor. May need to fluid restrict her some and  encourage more solid PO intake as she has decreased her oral intake in the past few months      MDD (major depressive disorder), recurrent, in partial remission  Psych evaluated  Continue current medications (trazodone 100 mg qhs, Cymbalta 60 mg daily)    CKD (chronic kidney disease) stage 3, GFR 30-59 ml/min  Creatinine 1, at baseline    Monitor for acute decompensation      Debility  Patient with increasing weakness, appearance of inability to care for herself.  Reports becoming bedbound and now inability to transfer in and out of her wheelchair.    Needs SNF placement and likely would benefit from [facility] but she is not sure she's ready to make that step. Has 1 friend she relies on to essentially do most of her care that is not simple ADLs.    Ok for discharge to SNF    Chronic diastolic (congestive) heart failure  Patient is identified as having Diastolic (HFpEF) heart failure that is Chronic. CHF is currently controlled. Latest ECHO performed and demonstrates- Results for orders placed during the hospital encounter of 02/13/23    Echo    Interpretation Summary  · The left ventricle is normal in size with concentric remodeling and normal systolic function.  · Mild left atrial enlargement.  · The estimated ejection fraction is 60%.  · Grade III left ventricular diastolic dysfunction.  · Normal right ventricular size with normal right ventricular systolic function.  · There is mild aortic valve stenosis.  · Aortic valve area is 1.50 cm2; peak velocity is 3.06 m/s; mean gradient is 22 mmHg.  · Mild tricuspid regurgitation.  . Continue Beta Blocker and ACE/ARB and monitor clinical status closely. Monitor on telemetry. Patient is off CHF pathway.  Monitor strict Is&Os and daily weights.  Place on fluid restriction of 1.5 L.  on diet for CHF. Last BNP reviewed- and noted below   No results for input(s): BNP, BNPTRIAGEBLO in the last 168 hours..    Per acute resp failure    Generalized anxiety  disorder        Hyperlipidemia  Continue Lipitor      Tobacco dependence        Essential hypertension  Normotensive currently    Continue cardizem, toprol, torsemide      Chronic obstructive pulmonary disease  Continue Breo as interchange  Duonebs PRN        VTE Risk Mitigation (From admission, onward)         Ordered     heparin (porcine) injection 5,000 Units  Every 8 hours         06/05/23 0216     IP VTE HIGH RISK PATIENT  Once         06/05/23 0216     Place sequential compression device  Until discontinued         06/05/23 0216                        Breana Maddox MD  Department of Hospital Medicine   [facility] - Intensive Care ([place])

## 2023-06-14 PROBLEM — N30.00 ACUTE CYSTITIS WITHOUT HEMATURIA: Status: RESOLVED | Noted: 2023-06-07 | Resolved: 2023-06-14

## 2023-06-14 PROBLEM — J96.22 ACUTE ON CHRONIC RESPIRATORY FAILURE WITH HYPOXIA AND HYPERCAPNIA: Status: ACTIVE | Noted: 2023-06-11

## 2023-06-14 PROBLEM — J96.21 ACUTE ON CHRONIC RESPIRATORY FAILURE WITH HYPOXIA AND HYPERCAPNIA: Status: ACTIVE | Noted: 2023-06-11

## 2023-06-14 PROBLEM — K59.03 THERAPEUTIC OPIOID INDUCED CONSTIPATION: Status: ACTIVE | Noted: 2023-06-14

## 2023-06-14 PROBLEM — T40.2X5A THERAPEUTIC OPIOID INDUCED CONSTIPATION: Status: ACTIVE | Noted: 2023-06-14

## 2023-06-14 LAB
ALBUMIN SERPL BCP-MCNC: 1.7 G/DL (ref 3.5–5.2)
ALP SERPL-CCNC: 143 U/L (ref 55–135)
ALT SERPL W/O P-5'-P-CCNC: 24 U/L (ref 10–44)
ANION GAP SERPL CALC-SCNC: 15 MMOL/L (ref 8–16)
AST SERPL-CCNC: 52 U/L (ref 10–40)
BASOPHILS # BLD AUTO: 0.02 K/UL (ref 0–0.2)
BASOPHILS NFR BLD: 0.2 % (ref 0–1.9)
BILIRUB SERPL-MCNC: 0.4 MG/DL (ref 0.1–1)
BUN SERPL-MCNC: 28 MG/DL (ref 8–23)
CALCIUM SERPL-MCNC: 8.7 MG/DL (ref 8.7–10.5)
CHLORIDE SERPL-SCNC: 95 MMOL/L (ref 95–110)
CO2 SERPL-SCNC: 26 MMOL/L (ref 23–29)
CREAT SERPL-MCNC: 1 MG/DL (ref 0.5–1.4)
DIFFERENTIAL METHOD: ABNORMAL
EOSINOPHIL # BLD AUTO: 0 K/UL (ref 0–0.5)
EOSINOPHIL NFR BLD: 0.1 % (ref 0–8)
ERYTHROCYTE [DISTWIDTH] IN BLOOD BY AUTOMATED COUNT: 18 % (ref 11.5–14.5)
EST. GFR  (NO RACE VARIABLE): 59 ML/MIN/1.73 M^2
GLUCOSE SERPL-MCNC: 84 MG/DL (ref 70–110)
HCT VFR BLD AUTO: 29.9 % (ref 37–48.5)
HGB BLD-MCNC: 10 G/DL (ref 12–16)
IMM GRANULOCYTES # BLD AUTO: 0.2 K/UL (ref 0–0.04)
IMM GRANULOCYTES NFR BLD AUTO: 1.9 % (ref 0–0.5)
LYMPHOCYTES # BLD AUTO: 1.3 K/UL (ref 1–4.8)
LYMPHOCYTES NFR BLD: 12.4 % (ref 18–48)
MAGNESIUM SERPL-MCNC: 2 MG/DL (ref 1.6–2.6)
MCH RBC QN AUTO: 32.1 PG (ref 27–31)
MCHC RBC AUTO-ENTMCNC: 33.4 G/DL (ref 32–36)
MCV RBC AUTO: 96 FL (ref 82–98)
MONOCYTES # BLD AUTO: 1.4 K/UL (ref 0.3–1)
MONOCYTES NFR BLD: 13.2 % (ref 4–15)
NEUTROPHILS # BLD AUTO: 7.8 K/UL (ref 1.8–7.7)
NEUTROPHILS NFR BLD: 72.2 % (ref 38–73)
NRBC BLD-RTO: 0 /100 WBC
PLATELET # BLD AUTO: 308 K/UL (ref 150–450)
PMV BLD AUTO: 10.3 FL (ref 9.2–12.9)
POTASSIUM SERPL-SCNC: 4.2 MMOL/L (ref 3.5–5.1)
PROT SERPL-MCNC: 5.6 G/DL (ref 6–8.4)
RBC # BLD AUTO: 3.12 M/UL (ref 4–5.4)
SODIUM SERPL-SCNC: 136 MMOL/L (ref 136–145)
WBC # BLD AUTO: 10.78 K/UL (ref 3.9–12.7)

## 2023-06-14 PROCEDURE — 63600175 PHARM REV CODE 636 W HCPCS: Performed by: INTERNAL MEDICINE

## 2023-06-14 PROCEDURE — 25000242 PHARM REV CODE 250 ALT 637 W/ HCPCS: Performed by: STUDENT IN AN ORGANIZED HEALTH CARE EDUCATION/TRAINING PROGRAM

## 2023-06-14 PROCEDURE — 25000003 PHARM REV CODE 250: Performed by: NURSE PRACTITIONER

## 2023-06-14 PROCEDURE — 83735 ASSAY OF MAGNESIUM: CPT | Performed by: INTERNAL MEDICINE

## 2023-06-14 PROCEDURE — 36415 COLL VENOUS BLD VENIPUNCTURE: CPT | Performed by: INTERNAL MEDICINE

## 2023-06-14 PROCEDURE — 99233 SBSQ HOSP IP/OBS HIGH 50: CPT | Mod: ,,, | Performed by: INTERNAL MEDICINE

## 2023-06-14 PROCEDURE — 99497 PR ADVNCD CARE PLAN 30 MIN: ICD-10-PCS | Mod: ,,, | Performed by: INTERNAL MEDICINE

## 2023-06-14 PROCEDURE — 99900035 HC TECH TIME PER 15 MIN (STAT)

## 2023-06-14 PROCEDURE — 99497 ADVNCD CARE PLAN 30 MIN: CPT | Mod: ,,, | Performed by: INTERNAL MEDICINE

## 2023-06-14 PROCEDURE — 63600175 PHARM REV CODE 636 W HCPCS: Performed by: NURSE PRACTITIONER

## 2023-06-14 PROCEDURE — 93010 ELECTROCARDIOGRAM REPORT: CPT | Mod: ,,, | Performed by: INTERNAL MEDICINE

## 2023-06-14 PROCEDURE — 94640 AIRWAY INHALATION TREATMENT: CPT

## 2023-06-14 PROCEDURE — 93005 ELECTROCARDIOGRAM TRACING: CPT

## 2023-06-14 PROCEDURE — 27000200 HC HIGH FLOW DEL DISP CIRCUIT

## 2023-06-14 PROCEDURE — 25000003 PHARM REV CODE 250: Performed by: INTERNAL MEDICINE

## 2023-06-14 PROCEDURE — 94660 CPAP INITIATION&MGMT: CPT

## 2023-06-14 PROCEDURE — 25000003 PHARM REV CODE 250: Performed by: HOSPITALIST

## 2023-06-14 PROCEDURE — 25000003 PHARM REV CODE 250: Performed by: STUDENT IN AN ORGANIZED HEALTH CARE EDUCATION/TRAINING PROGRAM

## 2023-06-14 PROCEDURE — 99233 SBSQ HOSP IP/OBS HIGH 50: CPT | Mod: ,,, | Performed by: HOSPITALIST

## 2023-06-14 PROCEDURE — 99233 PR SUBSEQUENT HOSPITAL CARE,LEVL III: ICD-10-PCS | Mod: ,,, | Performed by: HOSPITALIST

## 2023-06-14 PROCEDURE — 94761 N-INVAS EAR/PLS OXIMETRY MLT: CPT

## 2023-06-14 PROCEDURE — 93010 EKG 12-LEAD: ICD-10-PCS | Mod: ,,, | Performed by: INTERNAL MEDICINE

## 2023-06-14 PROCEDURE — 94799 UNLISTED PULMONARY SVC/PX: CPT

## 2023-06-14 PROCEDURE — 80053 COMPREHEN METABOLIC PANEL: CPT | Performed by: INTERNAL MEDICINE

## 2023-06-14 PROCEDURE — 85025 COMPLETE CBC W/AUTO DIFF WBC: CPT | Performed by: INTERNAL MEDICINE

## 2023-06-14 PROCEDURE — 63600175 PHARM REV CODE 636 W HCPCS

## 2023-06-14 PROCEDURE — 99233 PR SUBSEQUENT HOSPITAL CARE,LEVL III: ICD-10-PCS | Mod: ,,, | Performed by: INTERNAL MEDICINE

## 2023-06-14 PROCEDURE — 20000000 HC ICU ROOM

## 2023-06-14 PROCEDURE — 27100171 HC OXYGEN HIGH FLOW UP TO 24 HOURS

## 2023-06-14 PROCEDURE — 99233 PR SUBSEQUENT HOSPITAL CARE,LEVL III: ICD-10-PCS | Mod: GC,,, | Performed by: INTERNAL MEDICINE

## 2023-06-14 PROCEDURE — 99233 SBSQ HOSP IP/OBS HIGH 50: CPT | Mod: GC,,, | Performed by: INTERNAL MEDICINE

## 2023-06-14 RX ORDER — METOPROLOL TARTRATE 1 MG/ML
5 INJECTION, SOLUTION INTRAVENOUS ONCE
Status: COMPLETED | OUTPATIENT
Start: 2023-06-14 | End: 2023-06-14

## 2023-06-14 RX ADMIN — IPRATROPIUM BROMIDE AND ALBUTEROL SULFATE 3 ML: 2.5; .5 SOLUTION RESPIRATORY (INHALATION) at 07:06

## 2023-06-14 RX ADMIN — MORPHINE SULFATE 4 MG: 4 INJECTION, SOLUTION INTRAMUSCULAR; INTRAVENOUS at 04:06

## 2023-06-14 RX ADMIN — DILTIAZEM HYDROCHLORIDE 180 MG: 180 CAPSULE, COATED, EXTENDED RELEASE ORAL at 08:06

## 2023-06-14 RX ADMIN — METOPROLOL SUCCINATE 50 MG: 50 TABLET, EXTENDED RELEASE ORAL at 08:06

## 2023-06-14 RX ADMIN — BUSPIRONE HYDROCHLORIDE 10 MG: 10 TABLET ORAL at 09:06

## 2023-06-14 RX ADMIN — IPRATROPIUM BROMIDE AND ALBUTEROL SULFATE 3 ML: 2.5; .5 SOLUTION RESPIRATORY (INHALATION) at 12:06

## 2023-06-14 RX ADMIN — METOROPROLOL TARTRATE 5 MG: 5 INJECTION, SOLUTION INTRAVENOUS at 08:06

## 2023-06-14 RX ADMIN — LORAZEPAM 1 MG: 2 INJECTION INTRAMUSCULAR; INTRAVENOUS at 03:06

## 2023-06-14 RX ADMIN — TORSEMIDE 20 MG: 20 TABLET ORAL at 08:06

## 2023-06-14 RX ADMIN — MORPHINE SULFATE 4 MG: 4 INJECTION, SOLUTION INTRAMUSCULAR; INTRAVENOUS at 12:06

## 2023-06-14 RX ADMIN — MORPHINE SULFATE 4 MG: 4 INJECTION, SOLUTION INTRAMUSCULAR; INTRAVENOUS at 01:06

## 2023-06-14 RX ADMIN — HEPARIN SODIUM 5000 UNITS: 5000 INJECTION INTRAVENOUS; SUBCUTANEOUS at 09:06

## 2023-06-14 RX ADMIN — MICONAZOLE NITRATE: 20 OINTMENT TOPICAL at 09:06

## 2023-06-14 RX ADMIN — MICONAZOLE NITRATE: 20 OINTMENT TOPICAL at 03:06

## 2023-06-14 RX ADMIN — Medication 400 MG: at 09:06

## 2023-06-14 RX ADMIN — HYDRALAZINE HYDROCHLORIDE 20 MG: 20 INJECTION INTRAMUSCULAR; INTRAVENOUS at 06:06

## 2023-06-14 RX ADMIN — MORPHINE SULFATE 4 MG: 4 INJECTION, SOLUTION INTRAMUSCULAR; INTRAVENOUS at 02:06

## 2023-06-14 RX ADMIN — HEPARIN SODIUM 5000 UNITS: 5000 INJECTION INTRAVENOUS; SUBCUTANEOUS at 02:06

## 2023-06-14 RX ADMIN — MORPHINE SULFATE 4 MG: 4 INJECTION, SOLUTION INTRAMUSCULAR; INTRAVENOUS at 07:06

## 2023-06-14 RX ADMIN — MUPIROCIN: 20 OINTMENT TOPICAL at 09:06

## 2023-06-14 RX ADMIN — HEPARIN SODIUM 5000 UNITS: 5000 INJECTION INTRAVENOUS; SUBCUTANEOUS at 06:06

## 2023-06-14 NOTE — ASSESSMENT & PLAN NOTE
Patient with increasing weakness, appearance of inability to care for herself.  Reports becoming bedbound and now inability to transfer in and out of her wheelchair.    Discussed with Dr. Gilmore - patient's performance status does not allow for resumption of treatment for lung cancer and she is hospice appropriate despite not being in stage IV    Needs SNF placement and [facility] but she is not sure she's ready to make that step. Has 1 friend she relies on to essentially do most of her care that is not simple ADLs.

## 2023-06-14 NOTE — ASSESSMENT & PLAN NOTE
Xanax, gabapentin, norco, trazodone and promethazine on her home med list. Don't think this polypharmacy is helping her to improve her functional status if that is truly her goal.

## 2023-06-14 NOTE — PROGRESS NOTES
Progress Note  Pulmonary & Critical Care Medicine    Attending: Wilfrid Aguilera  Fellow: Aysha Moss  Admit Date: 6/4/2023  Today's Date: 06/14/2023      SUBJECTIVE:     Improved this AM. Able to communicate better and clearer. No increase WOB noted. Down to 30L 45%.    OBJECTIVE:     Vital Signs Trends/Hx Reviewed  Vitals:    06/14/23 0743 06/14/23 0800 06/14/23 0830 06/14/23 0841   BP:  137/64     BP Location:       Patient Position:       Pulse: (!) 135 (!) 129 (!) 117 (!) 117   Resp: (!) 28 (!) 24 (!) 47 20   Temp:       TempSrc:       SpO2: (!) 91% (!) 93% (!) 93% (!) 92%   Weight:       Height:           Ventilator settings:   No data recorded  Oxygen Concentration (%):  [45-70] 45        Physical Exam:  General: Laying in bed awake in NAD. More awake than previous days.  HEENT: AT/NC, PERRL, EOMI, oral and nasal mucosa moist. NC in place.  Neck: Supple without JVD or palpable LAD.   Cardiac: normal rate, regular rhythm, with no MRG with brisk cap refill and symmetric pulses in distal extremities.  Respiratory: Normal inspection. Symmetric chest rise. Normal palpation and percussion. Auscultation clear bilaterally. Mild increased work of breathing noted.   Abdomen: Soft, NT/ND. +BS. No hepatosplenomegaly.   Extremities: No edema.   Neuro: awake and answering questions.      Laboratory:  No results for input(s): PH, PCO2, PO2, HCO3, POCSATURATED, BE in the last 24 hours.    Recent Labs   Lab 06/14/23  0403   WBC 10.78   RBC 3.12*   HGB 10.0*   HCT 29.9*      MCV 96   MCH 32.1*   MCHC 33.4     Recent Labs   Lab 06/14/23  0403      K 4.2   CL 95   CO2 26   BUN 28*   CREATININE 1.0   MG 2.0       Microbiology Data:   Microbiology Results (last 7 days)       Procedure Component Value Units Date/Time    Urine culture [[id number]]  (Abnormal)  (Susceptibility) Collected: 06/05/23 0216    Order Status: Completed Specimen: Urine Updated: 06/07/23 1926     Urine Culture, Routine KLEBSIELLA  PNEUMONIAE  >100,000 cfu/ml  No other significant isolate      Narrative:      Specimen Source->Urine             Chest Imaging:   X-Ray Chest 1 View    Result Date: 6/12/2023  No significant interval changes.  See above discussion. This report was flagged in Epic as abnormal. Electronically signed by: Jam Jimenez Date:    06/12/2023 Time:    07:40        Infusions:     dexmedeTOMIDine (Precedex) infusion (titrating) Stopped (06/13/23 1147)       Scheduled Medications:    albuterol-ipratropium  3 mL Nebulization Q6H    ascorbic acid (vitamin C)  250 mg Oral Daily    atorvastatin  40 mg Oral Daily    busPIRone  10 mg Oral BID    cyanocobalamin  1,000 mcg Oral Daily    diltiaZEM  180 mg Oral Daily    DULoxetine  60 mg Oral Daily    ferrous sulfate  1 tablet Oral Daily    fluticasone furoate-vilanteroL  1 puff Inhalation Daily    heparin (porcine)  5,000 Units Subcutaneous Q8H    magnesium oxide  400 mg Oral BID    metoprolol succinate  50 mg Oral Daily    miconazole nitrate 2%   Topical (Top) BID    multivitamin  1 tablet Oral Daily    mupirocin   Nasal BID    nicotine  1 patch Transdermal Daily    torsemide  20 mg Oral Daily    vitamin D  1,000 Units Oral Daily       PRN Medications:   acetaminophen, albuterol-ipratropium, hydrALAZINE, hydrOXYzine HCL, lorazepam, morphine, naloxone, ondansetron, sodium chloride 0.9%, sodium chloride 3%    Problem List:   Patient Active Problem List   Diagnosis    Chronic obstructive pulmonary disease    Opioid dependence    Essential hypertension    Iron deficiency anemia    Tobacco dependence    Hyperlipidemia    Osteoporosis    Generalized anxiety disorder    Hypomagnesemia    Pulmonary nodule    Diverticulosis    Hyperkalemia    Chronic diastolic (congestive) heart failure    History of L3 compression fracture    Vitamin D deficiency    Vitamin B12 deficiency    Chronic pain syndrome    History of lung cancer    Chronic GI bleeding    History of alcohol abuse     Choledocholithiasis    Elevated LFTs    Urinary retention    Cervical spinal stenosis    Debility    Hypocalcemia    Spinal stenosis    Multiple thyroid nodules    CKD (chronic kidney disease) stage 3, GFR 30-59 ml/min    MDD (major depressive disorder), recurrent, in partial remission    PTSD (post-traumatic stress disorder)    Aortic atherosclerosis    Iron deficiency anemia due to chronic blood loss    Syndrome of inappropriate secretion of antidiuretic hormone    Other nonthrombocytopenic purpura    Disorder of joint prosthesis    Secondary malignant neoplasm of intrathoracic lymph nodes    Recurrent adenocarcinoma of left lung    Shock, unspecified    Advance care planning    Anemia of chronic disease    Electrolyte abnormality    Pleural effusion    Myelopathy in diseases classified elsewhere    Diarrhea    Nonrheumatic aortic valve stenosis    Somnolence    Acute cystitis without hematuria    Acute hypoxemic respiratory failure       ASSESSMENT & RECOMMENDATIONS     Neuro  #Agitation  -Much improved.  -Off of sedation.    Cardiovascular  #HFpEF  -2/13/23 TTE- LVEF 60%, G3DD  -Continue GDMT w/ torsemide  -Intermittent doses of lasix    #HTN  -Hypertensive episodes noted since admission.  -Continue current BP medication with PRNs    #PH  -WHO Group II  -Continue Torsemide    #HLD  -Continue statin    Pulmonary  #Acute Hypoxic Respiratory Failure  -Currently on 30L 45%. Targeting O2 sat 88-92%  -CXR reveals improved whiteout of left lung  -Bedside US shows small bilateral pleural effusions. Not big enough for thoracentesis.  -Believe whiteout to be 2/2 atelectasis vs mucus plugging.  -Continue pulmonary toilet with RT. 3% saline nebs ordered to facilitate.     #COPD  -Continue inhaler and duoneb treatment.    GI  -No acute issues at this time.    Renal   -No acute issues at this time    ID  #UTI  -Treated with 3 days ceftriaxone    Heme/Onc  #Normocytic Anemia  -montior    Endo  -No acute issues at this  time    Rheum  -No acute issues at this time    Thank you for allowing us to participate in the care of this patient. We will continue to follow. Please call with questions.    Aysha Moss M.D., PGY-V  U Pulmonary/Critical Care Fellow

## 2023-06-14 NOTE — PT/OT/SLP PROGRESS
Physical Therapy      Patient Name:  Nalini Varma   MRN:  [id number]    Patient not seen today secondary to Therapist assessment. Pt's orders remain unreconciled after transfer to ICU. PT will discharge orders at this time secondary to medical treatment / complexity of illness. Pt requiring sedation on and off for agitation as well as increased oxygen support.     Please re-consult as the pt becomes appropriate for therapy services.

## 2023-06-14 NOTE — PT/OT/SLP DISCHARGE
Occupational Therapy Discharge Summary    Nalini Varma  MRN: [id number]   Principal Problem: Acute hypoxemic respiratory failure      Patient Discharged from acute Occupational Therapy on 6/14/2023  .  Please refer to prior OT note dated 6/8/23 for functional status.    Assessment:      Patient transferred to ICU on 6/11/23 and therapy orders not reconciled.  Pt with declining medical/respiratory status.  Objective:     GOALS:   Multidisciplinary Problems       Occupational Therapy Goals          Problem: Occupational Therapy    Goal Priority Disciplines Outcome Interventions   Occupational Therapy Goal     OT, PT/OT Discontinued.    Description: Goals to be met by: 6/13/2023    Patient will increase functional independence with ADLs by performing:    UE Dressing with Stand-by Assistance.  LE Dressing with Minimal Assistance.  Grooming while seated with Supervision.  Toileting from bedside commode with Maximum Assistance for hygiene and clothing management.   Toilet transfer to bedside commode with Maximum Assistance.                         Reasons for Discontinuation of Therapy Services  Patient is unable to continue work toward goals because of medical or psychosocial complications.      Plan:     Patient Discharged to:  Pt remains in ICU.    6/14/2023

## 2023-06-14 NOTE — CARE UPDATE
06/14/23 0743   Patient Assessment/Suction   Level of Consciousness (AVPU) responds to voice   Respiratory Effort Unlabored   Expansion/Accessory Muscles/Retractions accessory muscle use   All Lung Fields Breath Sounds diminished   Skin Integrity   $ Wound Care Tech Time 15 min   Area Observed Left;Right;Behind ear;Nares   Skin Appearance without discoloration   PRE-TX-O2   Device (Oxygen Therapy) high flow nasal cannula w/device   $ Is the patient on High Flow Oxygen? Yes   $ Noninvasive Daily Charge Noninvasive Daily   Humidification temp set 33   Flow (L/min) 30   Oxygen Concentration (%) 45   SpO2 (!) 91 %   Pulse Oximetry Type Continuous   $ Pulse Oximetry - Multiple Charge Pulse Oximetry - Multiple   Pulse (!) 135   Resp (!) 28   Aerosol Therapy   $ Aerosol Therapy Charges Aerosol Treatment   Daily Review of Necessity (SVN) completed   Respiratory Treatment Status (SVN) given   Treatment Route (SVN) in-line   Patient Position (SVN) HOB elevated   Post Treatment Assessment (SVN) breath sounds unchanged   Signs of Intolerance (SVN) none   Ready to Wean/Extubation Screen   FIO2<=50 (chart decimal) 0.45

## 2023-06-14 NOTE — ASSESSMENT & PLAN NOTE
"Patient is identified as having Diastolic (HFpEF) heart failure that is Chronic. CHF is currently controlled. Latest ECHO performed and demonstrates- Results for orders placed during the hospital encounter of 02/13/23    Echo    Interpretation Summary  · The left ventricle is normal in size with concentric remodeling and normal systolic function.  · Mild left atrial enlargement.  · The estimated ejection fraction is 60%.  · Grade III left ventricular diastolic dysfunction.  · Normal right ventricular size with normal right ventricular systolic function.  · There is mild aortic valve stenosis.  · Aortic valve area is 1.50 cm2; peak velocity is 3.06 m/s; mean gradient is 22 mmHg.  · Mild tricuspid regurgitation.  . Continue Beta Blocker and ACE/ARB and monitor clinical status closely. Monitor on telemetry. Patient is off CHF pathway.  Monitor strict Is&Os and daily weights.  Place on fluid restriction of 1.5 L.  on diet for CHF. Last BNP reviewed- and noted below   No results for input(s): BNP, BNPTRIAGEBLO in the last 168 hours..    See "acute on chronic respiratory failure"  "

## 2023-06-14 NOTE — SUBJECTIVE & OBJECTIVE
Medications:  Continuous Infusions:  Scheduled Meds:   albuterol-ipratropium  3 mL Nebulization Q6H    ascorbic acid (vitamin C)  250 mg Oral Daily    atorvastatin  40 mg Oral Daily    busPIRone  10 mg Oral BID    cyanocobalamin  1,000 mcg Oral Daily    diltiaZEM  180 mg Oral Daily    DULoxetine  60 mg Oral Daily    ferrous sulfate  1 tablet Oral Daily    fluticasone furoate-vilanteroL  1 puff Inhalation Daily    heparin (porcine)  5,000 Units Subcutaneous Q8H    magnesium oxide  400 mg Oral BID    metoprolol succinate  50 mg Oral Daily    miconazole nitrate 2%   Topical (Top) BID    multivitamin  1 tablet Oral Daily    mupirocin   Nasal BID    torsemide  20 mg Oral Daily    vitamin D  1,000 Units Oral Daily     PRN Meds:acetaminophen, albuterol-ipratropium, hydrALAZINE, hydrOXYzine HCL, lorazepam, morphine, naloxone, ondansetron, sodium chloride 0.9%, sodium chloride 3%    Objective:     Vital Signs (Most Recent):  Temp: 99.1 °F (37.3 °C) (06/14/23 0730)  Pulse: (!) 122 (06/14/23 1208)  Resp: (!) 22 (06/14/23 1407)  BP: 137/64 (06/14/23 0800)  SpO2: (!) 92 % (06/14/23 1208) Vital Signs (24h Range):  Temp:  [98 °F (36.7 °C)-99.1 °F (37.3 °C)] 99.1 °F (37.3 °C)  Pulse:  [107-135] 122  Resp:  [17-47] 22  SpO2:  [90 %-97 %] 92 %  BP: (137-180)/() 137/64     Weight: 88.5 kg (195 lb 1.7 oz)  Body mass index is 31.51 kg/m².       Physical Exam  Constitutional:       Comments: Sitting up in bed, alert and conversational though fatigued, acutely and chronically ill-appearing    HENT:      Nose: Nose normal.      Mouth/Throat:      Mouth: Mucous membranes are dry.   Cardiovascular:      Rate and Rhythm: Tachycardia present.   Pulmonary:      Comments: Mildly increased work of breathing throughout visit, on Vapotherm 30L 45% FiO2  Skin:     General: Skin is warm.   Neurological:      Comments: Able to have coherent conversation     Advance Care Planning   Advance Directives:   [place]: Yes    Medical Power of :  Yes    Goals of Care: What is most important right now is to focus on remaining as independent as possible, improvement in condition but with limits to invasive therapies. Accordingly, we have decided that the best plan to meet the patient's goals includes continuing with treatment.       Significant Labs: All pertinent labs within the past 24 hours have been reviewed.  CBC:   Recent Labs   Lab 06/14/23 0403   WBC 10.78   HGB 10.0*   HCT 29.9*   MCV 96        BMP:  Recent Labs   Lab 06/14/23 0403   GLU 84      K 4.2   CL 95   CO2 26   BUN 28*   CREATININE 1.0   CALCIUM 8.7   MG 2.0     LFT:  Lab Results   Component Value Date    AST 52 (H) 06/14/2023    ALKPHOS 143 (H) 06/14/2023    BILITOT 0.4 06/14/2023     Albumin:   Albumin   Date Value Ref Range Status   06/14/2023 1.7 (L) 3.5 - 5.2 g/dL Final     Protein:   Total Protein   Date Value Ref Range Status   06/14/2023 5.6 (L) 6.0 - 8.4 g/dL Final     Lactic acid:   Lab Results   Component Value Date    LACTATE 1.1 02/13/2023    LACTATE 0.7 01/17/2023       Significant Imaging: I have reviewed all pertinent imaging results/findings within the past 24 hours.

## 2023-06-14 NOTE — PROGRESS NOTES
"[facility] Intensive Care [facility] Medicine  Progress Note    Patient Name: Nalini Varma  MRN: [id number]  Patient Class: IP- Inpatient   Admission Date: 6/4/2023  Length of Stay: 9 days  Attending Physician: Breana Mckinney MD  Primary Care Provider: Yane Sahni MD        Subjective:     Principal Problem:Acute on chronic respiratory failure with hypoxia and hypercapnia        HPI:  From H&P by Emigdio Holly NP:  "The patient is a 72 y.o. female with a past medical history of Chronic obstructive pulmonary disease, Hyperlipidemia, Iron deficiency anemia, and Stage III CKD who presents for evaluation of bilateral leg pain. She reports she has been wheelchair bound for a year and states that she has had increased difficulty caring for herself over the last week. She reports she has been unable to transfer herself to her bed or to the toilet so she has been using a diaper. She called EMS today after she slid out of her wheelchair and was unable to get back into it without assistance. The patient reports she has been taking hydrocodone for the pain.  On initial workup, the patient is noted to have a low Na of 128 and appears disheveled.  The patient states she came to the hospital because she can't take care of herself anymore and would like placement for assistance."      Overview/Hospital Course:  Patient was admitted with UTI and failure to thrive and was treated for such. Improved and was stable for discharge, prolonged waiting for placement and the patient was placed on the virtual medicine team. 6/11 rapid response called for severe hypoxia and AMS and cxr revealed a white out left lung with pulmonary edema of the right lung. She was placed on high flow oxygen and placed in the ICU. She has had severe agitation and disorientation and did not tolerate BiPAP or even face mask. She received IV diuresis and monitoring in the ICU. Mental status waxed and waned.      Discussed with oncology, " patient was not a candidate for further treatment for lung cancer with chemotherapy or radiation.  In terms of her COPD/chronic respiratory failure she is considered end stage and is still smoking.  Palliative care was consulted, her son and daughter in law visited with her and were involved in the decision making process.      Interval History: Still poorly responsive when I saw her in the AM, patient woke up later and was able to visit with her son and palliative care staff.  Spirits reportedly better, she was able to make jokes with them.  They will be considering options in terms of future treatment (possible transition to comfort care).    Review of Systems   Unable to perform ROS: Acuity of condition   Objective:     Vital Signs (Most Recent):  Temp: 98.2 °F (36.8 °C) (06/14/23 1200)  Pulse: 104 (06/14/23 1437)  Resp: 16 (06/14/23 1437)  BP: (!) 141/75 (06/14/23 1407)  SpO2: (!) 94 % (06/14/23 1437) Vital Signs (24h Range):  Temp:  [98 °F (36.7 °C)-99.1 °F (37.3 °C)] 98.2 °F (36.8 °C)  Pulse:  [102-135] 104  Resp:  [13-47] 16  SpO2:  [84 %-97 %] 94 %  BP: (137-180)/() 141/75     Weight: 88.5 kg (195 lb 1.7 oz)  Body mass index is 31.51 kg/m².    Intake/Output Summary (Last 24 hours) at 6/14/2023 1551  Last data filed at 6/13/2023 1630  Gross per 24 hour   Intake --   Output 200 ml   Net -200 ml         Physical Exam  Constitutional:       Comments: Lethargic, ill-appearing, mumbling   Cardiovascular:      Rate and Rhythm: Normal rate and regular rhythm.      Heart sounds: Normal heart sounds. No murmur heard.    No gallop.   Pulmonary:      Effort: Pulmonary effort is normal.      Breath sounds: Rhonchi and rales present.      Comments: Dyspneic with increased WOB, pursed lip exhalation noted, tachypnea. Decreased breath sounds on left.   Abdominal:      General: Bowel sounds are normal.      Palpations: Abdomen is soft.   Skin:     General: Skin is warm and dry.           Significant Labs: All pertinent  labs within the past 24 hours have been reviewed.    Significant Imaging: I have reviewed all pertinent imaging results/findings within the past 24 hours.      Assessment/Plan:      * Acute on chronic respiratory failure with hypoxia and hypercapnia  Rapid response 6/11/23: Patient was on venturi mask 40% (8L) and desaturated to 50-60% this evening. Patient appeared cold and cyanotic but alert. Dr. Austin was present. Lung auscultation revealed diminished air entry to bilateral lungs, L>R. Given IV morphine 2mg x1 .Patient was placed on high flow 12L, which saw Pox improving to 83%. Patient reports feeling better, color returned to face. Given acute desaturation requiring Vapotherm support patient transferred to ICU.      Plan:  Whiteout left lung, afebrile without leukocytosis. H/O CHF and COPD and recurrent lung CA on recent chem/rads late 2022  Continue oxygen support per pulm - currently on HFNC  Continue IV lasix for diuresis  Will discuss with palliative.  Plan for meeting with son 6/14.  Follow CXR  Inreased frequency of IV morphine 4mg Q6 PRN to Q4 PRN for dyspnea    Somnolence  Xanax, gabapentin, norco, trazodone and promethazine on her home med list. Don't think this polypharmacy is helping her to improve her functional status if that is truly her goal.    Recurrent adenocarcinoma of left lung  Discussed with oncologist (Dr. Gilmore)  Patient diagnosed without biopsy but was presented at tumor board  Unable to tolerate more than a few sessions of chemotherapy/XRT  Although not considered stage IV she should quality for hospice as no further treatment is recommended currently due to her poor performance status.      Syndrome of inappropriate secretion of antidiuretic hormone  History of SIADH, monitor. May need to fluid restrict her some and encourage more solid PO intake as she has decreased her oral intake in the past few months      MDD (major depressive disorder), recurrent, in partial remission  Psych  "evaluated  Continue current medications (trazodone 100 mg qhs, Cymbalta 60 mg daily)    CKD (chronic kidney disease) stage 3, GFR 30-59 ml/min  Creatinine 1, at baseline    Monitor for acute decompensation      Debility  Patient with increasing weakness, appearance of inability to care for herself.  Reports becoming bedbound and now inability to transfer in and out of her wheelchair.    Discussed with Dr. Gilmore - patient's performance status does not allow for resumption of treatment for lung cancer and she is hospice appropriate despite not being in stage IV    Needs SNF placement and likely would benefit from [facility] but she is not sure she's ready to make that step. Has 1 friend she relies on to essentially do most of her care that is not simple ADLs.        Chronic diastolic (congestive) heart failure  Patient is identified as having Diastolic (HFpEF) heart failure that is Chronic. CHF is currently controlled. Latest ECHO performed and demonstrates- Results for orders placed during the hospital encounter of 02/13/23    Echo    Interpretation Summary  · The left ventricle is normal in size with concentric remodeling and normal systolic function.  · Mild left atrial enlargement.  · The estimated ejection fraction is 60%.  · Grade III left ventricular diastolic dysfunction.  · Normal right ventricular size with normal right ventricular systolic function.  · There is mild aortic valve stenosis.  · Aortic valve area is 1.50 cm2; peak velocity is 3.06 m/s; mean gradient is 22 mmHg.  · Mild tricuspid regurgitation.  . Continue Beta Blocker and ACE/ARB and monitor clinical status closely. Monitor on telemetry. Patient is off CHF pathway.  Monitor strict Is&Os and daily weights.  Place on fluid restriction of 1.5 L.  on diet for CHF. Last BNP reviewed- and noted below   No results for input(s): BNP, BNPTRIAGEBLO in the last 168 hours..    See "acute on chronic respiratory failure"    Generalized anxiety " disorder        Hyperlipidemia  Continue Lipitor      Tobacco dependence        Essential hypertension  Normotensive currently    Continue cardizem, metoprolol, torsemide      Chronic obstructive pulmonary disease  Continue Breo as interchange  Duonebs PRN  Patient is a current smoker        VTE Risk Mitigation (From admission, onward)         Ordered     heparin (porcine) injection 5,000 Units  Every 8 hours         06/05/23 0216     IP VTE HIGH RISK PATIENT  Once         06/05/23 0216     Place sequential compression device  Until discontinued         06/05/23 0216                        Breana Maddox MD  Department of Hospital Medicine   [facility] ([place])

## 2023-06-14 NOTE — ASSESSMENT & PLAN NOTE
- Pt unsure when last BM was; recommend starting Senna daily scheduled. If she has not had any output in several days, may require a suppository.

## 2023-06-14 NOTE — ASSESSMENT & PLAN NOTE
Temp Readings from Last 3 Encounters:   06/14/23 98.3 °F (36.8 °C) (Oral)   03/13/23 98.1 °F (36.7 °C)   03/06/23 98.3 °F (36.8 °C) (Oral)     Lab Results   Component Value Date    WBC 10.78 06/14/2023     No results for input(s): LACTATE in the last 72 hours.    Has completed course of IV Rocephin    Cultures were taken-   Microbiology Results (last 7 days)     Procedure Component Value Units Date/Time    Urine culture [[id number]]  (Abnormal)  (Susceptibility) Collected: 06/05/23 0216    Order Status: Completed Specimen: Urine Updated: 06/07/23 1926     Urine Culture, Routine KLEBSIELLA PNEUMONIAE  >100,000 cfu/ml  No other significant isolate      Narrative:      Specimen Source->Urine

## 2023-06-14 NOTE — ASSESSMENT & PLAN NOTE
Rapid response 6/11/23: Patient was on venturi mask 40% (8L) and desaturated to 50-60% this evening. Patient appeared cold and cyanotic but alert. Dr. Austin was present. Lung auscultation revealed diminished air entry to bilateral lungs, L>R. Given IV morphine 2mg x1 .Patient was placed on high flow 12L, which saw Pox improving to 83%. Patient reports feeling better, color returned to face. Given acute desaturation requiring Vapotherm support patient transferred to ICU.      Plan:  Whiteout left lung, afebrile without leukocytosis. H/O CHF and COPD and recurrent lung CA on recent chem/rads late 2022  Continue oxygen support per pulm - currently on HFNC  Continue IV lasix for diuresis  Will discuss with palliative.  Plan for meeting with son 6/14.  Follow CXR  Inreased frequency of IV morphine 4mg Q6 PRN to Q4 PRN for dyspnea

## 2023-06-14 NOTE — SUBJECTIVE & OBJECTIVE
Interval History: Still poorly responsive when I saw her in the AM, patient woke up later and was able to visit with her son and palliative care staff.  Spirits reportedly better, she was able to make jokes with them.  They will be considering options in terms of future treatment (possible transition to comfort care).    Review of Systems   Unable to perform ROS: Acuity of condition   Objective:     Vital Signs (Most Recent):  Temp: 98.2 °F (36.8 °C) (06/14/23 1200)  Pulse: 104 (06/14/23 1437)  Resp: 16 (06/14/23 1437)  BP: (!) 141/75 (06/14/23 1407)  SpO2: (!) 94 % (06/14/23 1437) Vital Signs (24h Range):  Temp:  [98 °F (36.7 °C)-99.1 °F (37.3 °C)] 98.2 °F (36.8 °C)  Pulse:  [102-135] 104  Resp:  [13-47] 16  SpO2:  [84 %-97 %] 94 %  BP: (137-180)/() 141/75     Weight: 88.5 kg (195 lb 1.7 oz)  Body mass index is 31.51 kg/m².    Intake/Output Summary (Last 24 hours) at 6/14/2023 1551  Last data filed at 6/13/2023 1630  Gross per 24 hour   Intake --   Output 200 ml   Net -200 ml         Physical Exam  Constitutional:       Comments: Lethargic, ill-appearing, mumbling   Cardiovascular:      Rate and Rhythm: Normal rate and regular rhythm.      Heart sounds: Normal heart sounds. No murmur heard.    No gallop.   Pulmonary:      Effort: Pulmonary effort is normal.      Breath sounds: Rhonchi and rales present.      Comments: Dyspneic with increased WOB, pursed lip exhalation noted, tachypnea. Decreased breath sounds on left.   Abdominal:      General: Bowel sounds are normal.      Palpations: Abdomen is soft.   Skin:     General: Skin is warm and dry.           Significant Labs: All pertinent labs within the past 24 hours have been reviewed.    Significant Imaging: I have reviewed all pertinent imaging results/findings within the past 24 hours.

## 2023-06-14 NOTE — ASSESSMENT & PLAN NOTE
"6/14/23  - Chart and interval history reviewed; discussed pt in person with staff Dr Mckinney. Pt was resting during my initial visit.   - Later in the day, Amanda Escobedo (palliative RN) and I met with pt's very supportive son Romain and daughter-in-law at bedside, who had just arrived from [place]. During entirety of visit, pt was alert, able to participate in discussion, and even cracked a joke.   - Let them know that we have seen patient during a number of her prior hospital stays, and that we appreciate her sassiness and sense of humor. They agreed that is what she is known for; she actually has had a number of cats named "Sassy."   - Thorough medical update provided, and discussed current interventions patient is receiving (largely diuresis and Vapotherm, though also some PRN morphine for refractory dyspnea). Discussed that options moving forward are to continue with current level of care while monitoring for improvement, vs solely focusing on comfort. Explained how that would differ from our current management and philosophy. If continuing down the first path, it would require patient to work with PT/OT when able, as well as likely SNF placement if things go as planned.   - Did share with them patient's consistent preference of DNR/DNI, which they will respect. Assured them this code status is reasonable given pt's underlying lung disease and overall debility.   - Encouraged family to discuss patient's preferences moving forward (continued level of care while monitoring for improvement, vs exclusively focusing on comfort), and reminded them that we will be supportive of either path. Family agreed to do so, and is clearly invested in her care, and with a good understanding of her as a person.    - Our team will follow up with pt and family; updated Dr Mckinney and bedside RN Annette after visit      See prior notes on file for previous ACP discussions.   "

## 2023-06-14 NOTE — PROGRESS NOTES
"[facility] ([facility]  Palliative Medicine  Progress Note    Patient Name: Nalini Varma  MRN: [id number]  Admission Date: 6/4/2023  Hospital Length of Stay: 9 days  Code Status: DNR   Attending Provider: Breana Mckinney MD  Consulting Provider: Beata Parks MD  Primary Care Physician: Yane Sahni MD  Principal Problem:Acute hypoxemic respiratory failure    Assessment/Plan:     Advance Care Planning       6/14/23  - Chart and interval history reviewed; discussed pt in person with staff Dr Mckinney. Pt was resting during my initial visit.   - Later in the day, Amanda Escobedo (palliative RN) and I met with pt's very supportive son Romain and daughter-in-law at bedside, who had just arrived from [place]. During entirety of visit, pt was alert, able to participate in discussion, and even cracked a joke or two.   - Let them know that we have seen patient during a number of her prior hospital stays, and that we appreciate her sassiness and sense of humor. They agreed that is what she is known for; she actually has had a number of cats named "Sassy."   - Thorough medical update provided, and discussed current interventions patient is receiving (largely diuresis and Vapotherm, though also some PRN morphine for refractory dyspnea). Discussed that options moving forward are to continue with current level of care while monitoring for improvement, vs solely focusing on comfort. Explained how that would differ from our current management and philosophy. If continuing down the first path, it would require patient to work with PT/OT when able, as well as likely SNF placement if things go as planned.   - Did share with them patient's consistent preference of DNR/DNI, which they will respect. Assured them this code status is reasonable given pt's underlying lung disease and overall debility.   - Encouraged family to discuss patient's preferences moving forward (continued level of care while " monitoring for improvement, vs exclusively focusing on comfort), and reminded them that we will be supportive of either path. Family agreed to do so; they are clearly invested in her care, and have a great understanding of her as a person.    - Our team will follow up with pt and family; updated Dr Mckinney and bedside RN Annette after visit      See ACP notes on file for previous discussions by my colleague Dr Gutierrez and myself.     Pulmonary  Acute hypoxemic respiratory failure  - Transferred to ICU over weekend for this, which was likely due to whiteout of long from mucus plugging vs atelectasis.   - PCCM consulted and managing; on 30L 45% during visit today. Receiving diuresis, saline nebs.   - Also has PRN opioids (IV morphine) though would recommend increasing this to Q3H PRN if needed     Chronic obstructive pulmonary disease  - Significant underlying disease; on home oxygen     Cardiac/Vascular  Chronic diastolic (congestive) heart failure  - Noted history; EF 60% and has Grade III left ventricular diastolic dysfunction.  - diuresis per primary team and PCCM     Oncology  Recurrent adenocarcinoma of left lung  - Per Dr. Gilmore during this admission, patient has completed her course of treatment and will need follow up with oncology outpatient on discharge    GI  Therapeutic opioid induced constipation  - Pt unsure when last BM was; recommend starting Senna daily scheduled. If she has not had any output in several days, may require a suppository.     Other  Debility  - Mainly bed/wheelchair bound  - Transfers herself in/out of wheelchair  - plan was for SNF prior to this decompensation     I will follow-up with patient. Please contact us if you have any additional questions.    GASTON Burris  Palliative Medicine Staff   [phone]    Total visit time: 51 minutes    > 50% of 35 min visit spent in chart review, face to face discussion of symptom assessment, coordination of care with other specialists,  and discharge planning.    16 min ACP time spent: goals of care, emotional support, formulating and communicating prognosis, exploring burden/ benefit of various approaches of treatment.     Subjective:     Interval History: Spoke with patient and family at bedside.     Medications:  Continuous Infusions:  Scheduled Meds:   albuterol-ipratropium  3 mL Nebulization Q6H    ascorbic acid (vitamin C)  250 mg Oral Daily    atorvastatin  40 mg Oral Daily    busPIRone  10 mg Oral BID    cyanocobalamin  1,000 mcg Oral Daily    diltiaZEM  180 mg Oral Daily    DULoxetine  60 mg Oral Daily    ferrous sulfate  1 tablet Oral Daily    fluticasone furoate-vilanteroL  1 puff Inhalation Daily    heparin (porcine)  5,000 Units Subcutaneous Q8H    magnesium oxide  400 mg Oral BID    metoprolol succinate  50 mg Oral Daily    miconazole nitrate 2%   Topical (Top) BID    multivitamin  1 tablet Oral Daily    mupirocin   Nasal BID    torsemide  20 mg Oral Daily    vitamin D  1,000 Units Oral Daily     PRN Meds:acetaminophen, albuterol-ipratropium, hydrALAZINE, hydrOXYzine HCL, lorazepam, morphine, naloxone, ondansetron, sodium chloride 0.9%, sodium chloride 3%    Objective:     Vital Signs (Most Recent):  Temp: 99.1 °F (37.3 °C) (06/14/23 0730)  Pulse: (!) 122 (06/14/23 1208)  Resp: (!) 22 (06/14/23 1407)  BP: 137/64 (06/14/23 0800)  SpO2: (!) 92 % (06/14/23 1208) Vital Signs (24h Range):  Temp:  [98 °F (36.7 °C)-99.1 °F (37.3 °C)] 99.1 °F (37.3 °C)  Pulse:  [107-135] 122  Resp:  [17-47] 22  SpO2:  [90 %-97 %] 92 %  BP: (137-180)/() 137/64     Weight: 88.5 kg (195 lb 1.7 oz)  Body mass index is 31.51 kg/m².       Physical Exam  Constitutional:       Comments: Sitting up in bed, alert and conversational though fatigued, acutely and chronically ill-appearing    HENT:      Nose: Nose normal.      Mouth/Throat:      Mouth: Mucous membranes are dry.   Cardiovascular:      Rate and Rhythm: Tachycardia present.   Pulmonary:      Comments:  Mildly increased work of breathing throughout visit, on Vapotherm 30L 45% FiO2  Skin:     General: Skin is warm.   Neurological:      Comments: Able to have coherent conversation     Advance Care Planning   Advance Directives:   LaPOST: Yes    Medical Power of : Yes    Goals of Care: What is most important right now is to focus on remaining as independent as possible, improvement in condition but with limits to invasive therapies. Accordingly, we have decided that the best plan to meet the patient's goals includes continuing with treatment.       Significant Labs: All pertinent labs within the past 24 hours have been reviewed.  CBC:   Recent Labs   Lab 06/14/23 0403   WBC 10.78   HGB 10.0*   HCT 29.9*   MCV 96        BMP:  Recent Labs   Lab 06/14/23 0403   GLU 84      K 4.2   CL 95   CO2 26   BUN 28*   CREATININE 1.0   CALCIUM 8.7   MG 2.0     LFT:  Lab Results   Component Value Date    AST 52 (H) 06/14/2023    ALKPHOS 143 (H) 06/14/2023    BILITOT 0.4 06/14/2023     Albumin:   Albumin   Date Value Ref Range Status   06/14/2023 1.7 (L) 3.5 - 5.2 g/dL Final     Protein:   Total Protein   Date Value Ref Range Status   06/14/2023 5.6 (L) 6.0 - 8.4 g/dL Final     Lactic acid:   Lab Results   Component Value Date    LACTATE 1.1 02/13/2023    LACTATE 0.7 01/17/2023       Significant Imaging: I have reviewed all pertinent imaging results/findings within the past 24 hours.

## 2023-06-14 NOTE — ASSESSMENT & PLAN NOTE
- Transferred to ICU over weekend for this, which was likely due to whiteout of long from mucus plugging vs atelectasis.   - PCCM consulted and managing; on 30L 45% during visit today. Receiving diuresis, saline nebs.   - Also has PRN opioids (IV morphine) though would recommend increasing this to Q3H PRN if needed

## 2023-06-14 NOTE — PT/OT/SLP PROGRESS
Occupational Therapy      Patient Name:  Nalini Varma   MRN:  [id number]    Patient not seen today secondary to Therapist assessment (Orders not reconciled after being transferred to ICU on 6/11/23.).   D/C of OT orders due to medical status.  Pt requiring sedation for agitation and needing increased O2 support.      6/14/2023

## 2023-06-15 LAB
ALBUMIN SERPL BCP-MCNC: 1.7 G/DL (ref 3.5–5.2)
ALP SERPL-CCNC: 137 U/L (ref 55–135)
ALT SERPL W/O P-5'-P-CCNC: 27 U/L (ref 10–44)
ANION GAP SERPL CALC-SCNC: 14 MMOL/L (ref 8–16)
AST SERPL-CCNC: 45 U/L (ref 10–40)
BASOPHILS # BLD AUTO: 0.01 K/UL (ref 0–0.2)
BASOPHILS NFR BLD: 0.1 % (ref 0–1.9)
BILIRUB SERPL-MCNC: 0.5 MG/DL (ref 0.1–1)
BUN SERPL-MCNC: 32 MG/DL (ref 8–23)
CALCIUM SERPL-MCNC: 9.1 MG/DL (ref 8.7–10.5)
CHLORIDE SERPL-SCNC: 96 MMOL/L (ref 95–110)
CO2 SERPL-SCNC: 29 MMOL/L (ref 23–29)
CREAT SERPL-MCNC: 0.9 MG/DL (ref 0.5–1.4)
DIFFERENTIAL METHOD: ABNORMAL
EOSINOPHIL # BLD AUTO: 0 K/UL (ref 0–0.5)
EOSINOPHIL NFR BLD: 0 % (ref 0–8)
ERYTHROCYTE [DISTWIDTH] IN BLOOD BY AUTOMATED COUNT: 18.6 % (ref 11.5–14.5)
EST. GFR  (NO RACE VARIABLE): >60 ML/MIN/1.73 M^2
GLUCOSE SERPL-MCNC: 92 MG/DL (ref 70–110)
HCT VFR BLD AUTO: 30.2 % (ref 37–48.5)
HGB BLD-MCNC: 9.8 G/DL (ref 12–16)
IMM GRANULOCYTES # BLD AUTO: 0.16 K/UL (ref 0–0.04)
IMM GRANULOCYTES NFR BLD AUTO: 1.3 % (ref 0–0.5)
LYMPHOCYTES # BLD AUTO: 1.1 K/UL (ref 1–4.8)
LYMPHOCYTES NFR BLD: 9.3 % (ref 18–48)
MAGNESIUM SERPL-MCNC: 2 MG/DL (ref 1.6–2.6)
MCH RBC QN AUTO: 31.6 PG (ref 27–31)
MCHC RBC AUTO-ENTMCNC: 32.5 G/DL (ref 32–36)
MCV RBC AUTO: 97 FL (ref 82–98)
MONOCYTES # BLD AUTO: 1.3 K/UL (ref 0.3–1)
MONOCYTES NFR BLD: 10.6 % (ref 4–15)
NEUTROPHILS # BLD AUTO: 9.5 K/UL (ref 1.8–7.7)
NEUTROPHILS NFR BLD: 78.7 % (ref 38–73)
NRBC BLD-RTO: 0 /100 WBC
PLATELET # BLD AUTO: 311 K/UL (ref 150–450)
PMV BLD AUTO: 9.8 FL (ref 9.2–12.9)
POTASSIUM SERPL-SCNC: 3.8 MMOL/L (ref 3.5–5.1)
PROT SERPL-MCNC: 5.7 G/DL (ref 6–8.4)
RBC # BLD AUTO: 3.1 M/UL (ref 4–5.4)
SODIUM SERPL-SCNC: 139 MMOL/L (ref 136–145)
WBC # BLD AUTO: 12.03 K/UL (ref 3.9–12.7)

## 2023-06-15 PROCEDURE — 94799 UNLISTED PULMONARY SVC/PX: CPT

## 2023-06-15 PROCEDURE — 99233 SBSQ HOSP IP/OBS HIGH 50: CPT | Mod: ,,, | Performed by: HOSPITALIST

## 2023-06-15 PROCEDURE — 25000003 PHARM REV CODE 250: Performed by: HOSPITALIST

## 2023-06-15 PROCEDURE — 25000003 PHARM REV CODE 250: Performed by: NURSE PRACTITIONER

## 2023-06-15 PROCEDURE — 94761 N-INVAS EAR/PLS OXIMETRY MLT: CPT

## 2023-06-15 PROCEDURE — 85025 COMPLETE CBC W/AUTO DIFF WBC: CPT | Performed by: INTERNAL MEDICINE

## 2023-06-15 PROCEDURE — 63600175 PHARM REV CODE 636 W HCPCS: Performed by: INTERNAL MEDICINE

## 2023-06-15 PROCEDURE — 83735 ASSAY OF MAGNESIUM: CPT | Performed by: INTERNAL MEDICINE

## 2023-06-15 PROCEDURE — 94640 AIRWAY INHALATION TREATMENT: CPT

## 2023-06-15 PROCEDURE — 99233 SBSQ HOSP IP/OBS HIGH 50: CPT | Mod: ,,, | Performed by: STUDENT IN AN ORGANIZED HEALTH CARE EDUCATION/TRAINING PROGRAM

## 2023-06-15 PROCEDURE — 63600175 PHARM REV CODE 636 W HCPCS: Performed by: HOSPITALIST

## 2023-06-15 PROCEDURE — 27100171 HC OXYGEN HIGH FLOW UP TO 24 HOURS

## 2023-06-15 PROCEDURE — 99900035 HC TECH TIME PER 15 MIN (STAT)

## 2023-06-15 PROCEDURE — 99233 PR SUBSEQUENT HOSPITAL CARE,LEVL III: ICD-10-PCS | Mod: ,,, | Performed by: STUDENT IN AN ORGANIZED HEALTH CARE EDUCATION/TRAINING PROGRAM

## 2023-06-15 PROCEDURE — 99233 PR SUBSEQUENT HOSPITAL CARE,LEVL III: ICD-10-PCS | Mod: ,,, | Performed by: HOSPITALIST

## 2023-06-15 PROCEDURE — 63600175 PHARM REV CODE 636 W HCPCS

## 2023-06-15 PROCEDURE — 99497 ADVNCD CARE PLAN 30 MIN: CPT | Mod: ,,, | Performed by: STUDENT IN AN ORGANIZED HEALTH CARE EDUCATION/TRAINING PROGRAM

## 2023-06-15 PROCEDURE — 25000003 PHARM REV CODE 250: Performed by: INTERNAL MEDICINE

## 2023-06-15 PROCEDURE — 25000242 PHARM REV CODE 250 ALT 637 W/ HCPCS: Performed by: STUDENT IN AN ORGANIZED HEALTH CARE EDUCATION/TRAINING PROGRAM

## 2023-06-15 PROCEDURE — 80053 COMPREHEN METABOLIC PANEL: CPT | Performed by: INTERNAL MEDICINE

## 2023-06-15 PROCEDURE — 99497 PR ADVNCD CARE PLAN 30 MIN: ICD-10-PCS | Mod: ,,, | Performed by: STUDENT IN AN ORGANIZED HEALTH CARE EDUCATION/TRAINING PROGRAM

## 2023-06-15 PROCEDURE — 63600175 PHARM REV CODE 636 W HCPCS: Performed by: NURSE PRACTITIONER

## 2023-06-15 PROCEDURE — 51702 INSERT TEMP BLADDER CATH: CPT

## 2023-06-15 PROCEDURE — 11000001 HC ACUTE MED/SURG PRIVATE ROOM

## 2023-06-15 PROCEDURE — 36415 COLL VENOUS BLD VENIPUNCTURE: CPT | Performed by: INTERNAL MEDICINE

## 2023-06-15 RX ORDER — LORAZEPAM 2 MG/ML
1 INJECTION INTRAMUSCULAR EVERY 4 HOURS PRN
Start: 2023-06-15

## 2023-06-15 RX ORDER — MORPHINE SULFATE 2 MG/ML
4 INJECTION, SOLUTION INTRAMUSCULAR; INTRAVENOUS EVERY 4 HOURS PRN
Refills: 0
Start: 2023-06-15 | End: 2023-06-15 | Stop reason: SDUPTHER

## 2023-06-15 RX ORDER — MORPHINE SULFATE 2 MG/ML
4 INJECTION, SOLUTION INTRAMUSCULAR; INTRAVENOUS
Refills: 0
Start: 2023-06-15

## 2023-06-15 RX ORDER — IPRATROPIUM BROMIDE AND ALBUTEROL SULFATE 2.5; .5 MG/3ML; MG/3ML
3 SOLUTION RESPIRATORY (INHALATION) EVERY 6 HOURS PRN
Status: DISCONTINUED | OUTPATIENT
Start: 2023-06-15 | End: 2023-06-16 | Stop reason: HOSPADM

## 2023-06-15 RX ORDER — IPRATROPIUM BROMIDE AND ALBUTEROL SULFATE 2.5; .5 MG/3ML; MG/3ML
3 SOLUTION RESPIRATORY (INHALATION) EVERY 6 HOURS PRN
Refills: 0
Start: 2023-06-15

## 2023-06-15 RX ORDER — MORPHINE SULFATE 4 MG/ML
4 INJECTION, SOLUTION INTRAMUSCULAR; INTRAVENOUS
Status: DISCONTINUED | OUTPATIENT
Start: 2023-06-15 | End: 2023-06-16

## 2023-06-15 RX ADMIN — Medication 1000 UNITS: at 08:06

## 2023-06-15 RX ADMIN — IPRATROPIUM BROMIDE AND ALBUTEROL SULFATE 3 ML: 2.5; .5 SOLUTION RESPIRATORY (INHALATION) at 12:06

## 2023-06-15 RX ADMIN — Medication 250 MG: at 08:06

## 2023-06-15 RX ADMIN — THERA TABS 1 TABLET: TAB at 08:06

## 2023-06-15 RX ADMIN — DILTIAZEM HYDROCHLORIDE 180 MG: 180 CAPSULE, COATED, EXTENDED RELEASE ORAL at 08:06

## 2023-06-15 RX ADMIN — METOPROLOL SUCCINATE 50 MG: 50 TABLET, EXTENDED RELEASE ORAL at 08:06

## 2023-06-15 RX ADMIN — HYDRALAZINE HYDROCHLORIDE 20 MG: 20 INJECTION INTRAMUSCULAR; INTRAVENOUS at 05:06

## 2023-06-15 RX ADMIN — MICONAZOLE NITRATE: 20 OINTMENT TOPICAL at 08:06

## 2023-06-15 RX ADMIN — FERROUS SULFATE TAB 325 MG (65 MG ELEMENTAL FE) 1 EACH: 325 (65 FE) TAB at 08:06

## 2023-06-15 RX ADMIN — Medication 400 MG: at 08:06

## 2023-06-15 RX ADMIN — LORAZEPAM 1 MG: 2 INJECTION INTRAMUSCULAR; INTRAVENOUS at 08:06

## 2023-06-15 RX ADMIN — IPRATROPIUM BROMIDE AND ALBUTEROL SULFATE 3 ML: 2.5; .5 SOLUTION RESPIRATORY (INHALATION) at 07:06

## 2023-06-15 RX ADMIN — MORPHINE SULFATE 4 MG: 4 INJECTION, SOLUTION INTRAMUSCULAR; INTRAVENOUS at 11:06

## 2023-06-15 RX ADMIN — TORSEMIDE 20 MG: 20 TABLET ORAL at 08:06

## 2023-06-15 RX ADMIN — DULOXETINE 60 MG: 30 CAPSULE, DELAYED RELEASE ORAL at 08:06

## 2023-06-15 RX ADMIN — ATORVASTATIN CALCIUM 40 MG: 20 TABLET, FILM COATED ORAL at 08:06

## 2023-06-15 RX ADMIN — HEPARIN SODIUM 5000 UNITS: 5000 INJECTION INTRAVENOUS; SUBCUTANEOUS at 05:06

## 2023-06-15 RX ADMIN — CYANOCOBALAMIN TAB 1000 MCG 1000 MCG: 1000 TAB at 08:06

## 2023-06-15 RX ADMIN — MICONAZOLE NITRATE: 20 OINTMENT TOPICAL at 10:06

## 2023-06-15 RX ADMIN — MORPHINE SULFATE 4 MG: 4 INJECTION, SOLUTION INTRAMUSCULAR; INTRAVENOUS at 04:06

## 2023-06-15 RX ADMIN — BUSPIRONE HYDROCHLORIDE 10 MG: 10 TABLET ORAL at 08:06

## 2023-06-15 RX ADMIN — MORPHINE SULFATE 4 MG: 4 INJECTION, SOLUTION INTRAMUSCULAR; INTRAVENOUS at 06:06

## 2023-06-15 RX ADMIN — MUPIROCIN: 20 OINTMENT TOPICAL at 08:06

## 2023-06-15 RX ADMIN — MORPHINE SULFATE 4 MG: 4 INJECTION, SOLUTION INTRAMUSCULAR; INTRAVENOUS at 12:06

## 2023-06-15 RX ADMIN — MUPIROCIN: 20 OINTMENT TOPICAL at 10:06

## 2023-06-15 NOTE — ASSESSMENT & PLAN NOTE
History of SIADH, monitor.   Sodium has improved with fluid restriction  Some improvement is due to dehydration

## 2023-06-15 NOTE — PLAN OF CARE
[facility]  Department of Hospital Medicine  [address] [place] [address]  [phone] after hours  [phone] fax    HOSPICE  ORDERS    06/16/2023    Admit to Hospice:   Inpatient Service    Diagnoses:   Active Hospital Problems    Diagnosis  POA    *Acute on chronic respiratory failure with hypoxia and hypercapnia [J96.21, J96.22]  Yes    Therapeutic opioid induced constipation [K59.03, T40.2X5A]  No    Somnolence [R40.0]  Yes    Recurrent adenocarcinoma of left lung [C34.92]  Yes    Syndrome of inappropriate secretion of antidiuretic hormone [E22.2]  Yes    MDD (major depressive disorder), recurrent, in partial remission [F33.41]  Yes    Debility [R53.81]  Yes    Chronic diastolic (congestive) heart failure [I50.32]  Yes    CKD (chronic kidney disease) stage 3, GFR 30-59 ml/min [N18.30]  Yes    Generalized anxiety disorder [F41.1]  Yes    Hyperlipidemia [E78.5]  Yes    Essential hypertension [I10]  Yes    Chronic obstructive pulmonary disease [J44.9]  Yes    Tobacco dependence [F17.200]  Yes      Resolved Hospital Problems    Diagnosis Date Resolved POA    Agitation [R45.1] 06/12/2023 No    Acute cystitis without hematuria [N30.00] 06/14/2023 Yes    Hyponatremia [E87.1] 06/07/2023 Yes       Hospice Qualifying Diagnoses:        Patient has a life expectancy < 6 months due to:  Primary Hospice Diagnosis:  Lung cancer  Comorbid Conditions Contributing to Decline:  End stage COPD, SIADH, tobacco dependence, CKD IIIb, chronic constipation    Vital Signs: Routine per Hospice Protocol.    Code Status: DNR    Allergies:   Review of patient's allergies indicates:   Allergen Reactions    Wellbutrin [bupropion hcl] Other (See Comments)     Hyponatremia and hypomagnesia     Zoloft [sertraline] Other (See Comments)     Hyponatremia and hypomagnesia     Bananas [banana]      Emesis, and stomach cramps    Patient states she is not allergic to Banana; she cannot eat one everyday        Diet: Pleasure feedings if desired    Activities: As tolerated    Goals of Care Treatment Preferences:  Code Status: DNR       LaPOST: Yes       Nursing: Per Hospice Routine.      Archuleta Care: Empty Archuleta bag Q shift and PRN.  Change Archuleta every month.    Routine Skin for Bedridden Patients: Apply moisture barrier cream to all skin folds and   wet areas in perineal area daily and after baths and all bowel movements.    Oxygen:  2 liters nasal cannula    Wound Care:  Sacral protection  Moisture associated dermatitis:  Every shift and as needed, provide basic hygiene care. Pat dry   Right posterior thigh and right medial foot:  Cleanse with Vashe wound cleanser. Pat dry. Apply Triad hydrophilic paste. Cover with silicone foam dressing.  Change every 2 days and as needed    Apply Critic Aid AF.    Medications:      Medication List        START taking these medications      albuterol-ipratropium 2.5 mg-0.5 mg/3 mL nebulizer solution  Commonly known as: DUO-NEB  Take 3 mLs by nebulization every 6 (six) hours as needed for Wheezing or Shortness of Breath. Rescue     LORazepam 2 mg/mL injection  Commonly known as: ATIVAN  Inject 0.5 mLs (1 mg total) into the vein every 4 (four) hours as needed (Anxiety).     morphine 2 mg/mL Soln  Inject 2 mLs (4 mg total) into the vein every 3 (three) hours as needed (Dyspnea, pain).            STOP taking these medications      ADVAIR -21 mcg/actuation Hfaa inhaler  Generic drug: fluticasone propion-salmeterol 115-21 mcg/dose     albuterol 90 mcg/actuation inhaler  Commonly known as: VENTOLIN HFA     ALPRAZolam 0.25 MG tablet  Commonly known as: XANAX     ascorbic acid (vitamin C) 250 MG tablet  Commonly known as: VITAMIN C     atorvastatin 40 MG tablet  Commonly known as: LIPITOR     azelastine 137 mcg (0.1 %) nasal spray  Commonly known as: ASTELIN     b complex vitamins capsule     biotin 10 mg Tab     CENTRUM SILVER WOMEN ORAL     cyanocobalamin (vitamin B-12) 1,000 mcg  Tbsr     diltiaZEM 180 MG 24 hr capsule  Commonly known as: CARDIZEM CD     DULoxetine 60 MG capsule  Commonly known as: CYMBALTA     ferrous sulfate 325 mg (65 mg iron) Tab tablet  Commonly known as: FEOSOL     fluticasone propionate 50 mcg/actuation nasal spray  Commonly known as: FLONASE     gabapentin 300 MG capsule  Commonly known as: NEURONTIN     guaiFENesin 600 mg 12 hr tablet  Commonly known as: MUCINEX     HYDROcodone-acetaminophen  mg per tablet  Commonly known as: NORCO     INCRUSE ELLIPTA 62.5 mcg/actuation inhalation capsule  Generic drug: umeclidinium     lutein 40 mg Cap     magnesium oxide 400 mg (241.3 mg magnesium) tablet  Commonly known as: MAG-OX     metoprolol succinate 25 MG 24 hr tablet  Commonly known as: TOPROL-XL     ondansetron 8 MG Tbdl  Commonly known as: ZOFRAN-ODT     OYSTER SHELL CALCIUM 500 500 mg calcium (1,250 mg) tablet  Generic drug: calcium carbonate     pantoprazole 40 MG tablet  Commonly known as: PROTONIX     PROLIA 60 mg/mL Syrg  Generic drug: denosumab     promethazine 25 MG tablet  Commonly known as: PHENERGAN     torsemide 20 MG Tab  Commonly known as: DEMADEX     traZODone 100 MG tablet  Commonly known as: DESYREL     vitamin D 1000 units Tab  Commonly known as: VITAMIN D3     ZINC ORAL              Future Orders:  Hospice Medical Director may dictate new orders for comfortable care measures & sign death certificate.        _________________________________  Breana Maddox MD  06/16/2023

## 2023-06-15 NOTE — PLAN OF CARE
[facility] sent referral for inpatient hospice to [facility].  [facility] said they are reviewing her chart for appropriateness for inpt hospice.  They have no beds at this time but if she is appropriate, she can be put on the waiting list and there is no one on their at this time.  [facility] also called [facility], who stated that they don't have any beds at this time and 3 people on the waiting list.  Mel[facility] will let me know something as soon as they make a decision.

## 2023-06-15 NOTE — ASSESSMENT & PLAN NOTE
Rapid response 6/11/23: Patient was on venturi mask 40% (8L) and desaturated to 50-60% on the floor while on virtual medicine service. Patient appeared cold and cyanotic but alert. Dr. Austin was present. Lung auscultation revealed diminished air entry to bilateral lungs, L>R. Given IV morphine 2mg x1. Patient was placed on high flow 12L, which saw Pox improving to 83%. Patient reports feeling better, color returned to face. Given acute desaturation requiring Vapotherm support patient transferred to ICU.      Plan:  - Whiteout left lung, afebrile without leukocytosis. H/O CHF and COPD and recurrent lung CA on recent chem/rads late 2022, unable to tolerate further treatment  - Continue oxygen support per pulm - remains on HFNC, does not tolerate BiPAP  - Patient ready for comfort care measures.  Inpatient hospice consulted.  Inreased frequency of IV morphine 4mg to q3 hours prn dyspnea/pain

## 2023-06-15 NOTE — ASSESSMENT & PLAN NOTE
Patient with increasing weakness, appearance of inability to care for herself.  Reports becoming bedbound and now inability to transfer in and out of her wheelchair.    Discussed with Dr. Gilmore - patient's performance status does not allow for resumption of treatment for lung cancer and she is hospice appropriate despite not being in stage IV.  Palliative care following.    See above - patient now on comfort care measures awaiting hospice

## 2023-06-15 NOTE — PROGRESS NOTES
[facility]ensive [facility] ([place])  Adult Nutrition  Progress Note    SUMMARY       Recommendations  1) D/t overall severity of patient condition, nutrition recommendations not appropriate at this time. RD will continue to follow. Please consult if alternate recommendations are needed    Goals: Patient to have diet re-initiated per medical status  Nutrition Goal Status: goal not met  Communication of RD Recs: reviewed with RN (Caitlin)    Assessment and Plan  Nutrition Problem  Inadequate energy intake    Related to (etiology):   End of life initiation    Signs and Symptoms (as evidenced by):   Decreased intake  Decline in ADLs  Mild subcutaneous fat and muscle loss    Interventions:  Collaboration with providers    Nutrition Diagnosis Status:   New       Malnutrition Assessment  Malnutrition Context: acute illness or injury, chronic illness, social/environmental circumstances  Malnutrition Level: mild          Energy Intake (Malnutrition): less than or equal to 50% for greater than or equal to 5 days  Subcutaneous Fat (Malnutrition): mild depletion  Muscle Mass (Malnutrition): mild depletion   Orbital Region (Subcutaneous Fat Loss): moderate depletion   Alevism Region (Muscle Loss): mild depletion       Reason for Assessment  Reason For Assessment: RD follow-up  Diagnosis: pulmonary disease, other (see comments) (Acute on chronic respiratory failure with hypoxia and hypercapnia)  Relevant Medical History: Chronic obstructive pulmonary disease, HTN, tobacco dependence, hyperlipidemia, chronic diastolic heart failure, hyponatremia, CKD stage 3  Interdisciplinary Rounds: did not attend  General Information Comments: Patient admitted for debility. Patient is wheelchair bound. Bananas noted on allergies. Patient asleep and family at bedside. Patient swallows pills with ginger ale. No other reported PO intake per RN. Limited assessment d/t patient being critically ill. NFPE: moderate orbital wasting; mild temporal  "wasting; mild muscle depletion; mild subcutaneous fat depletion. Asad score 11: high risk for pressure injury; altered skin integrity right medial foot, buttocks, right posterior thigh, posterior and anterior incontinence associated dermatitis.  Nutrition Discharge Planning: dc TBD    Nutrition Risk Screen  Nutrition Risk Screen: reduced oral intake over the last month    Nutrition/Diet History  Spiritual, Cultural Beliefs, Mosque Practices, Values that Affect Care: no  Food Allergies: other (see comments) (Bananas)  Factors Affecting Nutritional Intake: impaired cognitive status/motor control, clear liquid diet, socio-economic    Anthropometrics  Temp: 97.9 °F (36.6 °C)  Height: 5' 5.98" (167.6 cm)  Height (inches): 65.98 in  Weight Method: Standard Scale  Weight: 88.5 kg (195 lb 1.7 oz)  Weight (lb): 195.11 lb  Ideal Body Weight (IBW), Female: 129.9 lb  % Ideal Body Weight, Female (lb): 150.2 %  BMI (Calculated): 31.5  BMI Grade: 30 - 34.9- obesity - grade I     Lab/Procedures/Meds  Pertinent Labs Reviewed: reviewed  CBC:  Recent Labs   Lab 06/15/23  0307   WBC 12.03   HGB 9.8*   HCT 30.2*        CMP:  Recent Labs   Lab 06/15/23  0307   CALCIUM 9.1   ALBUMIN 1.7*   PROT 5.7*      K 3.8   CO2 29   CL 96   BUN 32*   CREATININE 0.9   ALKPHOS 137*   ALT 27   AST 45*   BILITOT 0.5     Pertinent Medications Reviewed: reviewed  Scheduled Meds:   albuterol-ipratropium  3 mL Nebulization Q6H    ascorbic acid (vitamin C)  250 mg Oral Daily    atorvastatin  40 mg Oral Daily    busPIRone  10 mg Oral BID    cyanocobalamin  1,000 mcg Oral Daily    diltiaZEM  180 mg Oral Daily    DULoxetine  60 mg Oral Daily    ferrous sulfate  1 tablet Oral Daily    fluticasone furoate-vilanteroL  1 puff Inhalation Daily    heparin (porcine)  5,000 Units Subcutaneous Q8H    magnesium oxide  400 mg Oral BID    metoprolol succinate  50 mg Oral Daily    miconazole nitrate 2%   Topical (Top) BID    multivitamin  1 tablet Oral " Daily    mupirocin   Nasal BID    torsemide  20 mg Oral Daily    vitamin D  1,000 Units Oral Daily     Continuous Infusions:  PRN Meds:.acetaminophen, albuterol-ipratropium, hydrALAZINE, hydrOXYzine HCL, lorazepam, morphine, naloxone, ondansetron, sodium chloride 0.9%, sodium chloride 3%    Estimated/Assessed Needs  Weight Used For Calorie Calculations: 88.5 kg (195 lb 1.7 oz)  Energy Calorie Requirements (kcal): 5939-9757  Energy Need Method: Kalida-St Jeor (1.1-1.3 kcal/kg)  Protein Requirements: 59-77g (1.0-1.3 g/kg)  Weight Used For Protein Calculations: 59.1 kg (130 lb 4.7 oz) (IBW)  Fluid Requirements (mL): 1 mL/kcal  Estimated Fluid Requirement Method:  (or per MD)  RDA Method (mL): 1547  CHO Requirement: 193g    Nutrition Prescription Ordered  Current Diet Order: Clear liquid diet    Evaluation of Received Nutrient/Fluid Intake  I/O: 505/2850  Energy Calories Required: not meeting needs  Protein Required: not meeting needs  Fluid Required: not meeting needs  Comments: LBM: 6/10/23  Tolerance: not tolerating  % Intake of Estimated Energy Needs: 0 - 25 %  % Meal Intake: 0 - 25 %    Nutrition Risk  Level of Risk/Frequency of Follow-up: high     Monitor and Evaluation  Food and Nutrient Intake: energy intake, food and beverage intake  Food and Nutrient Adminstration: diet order  Physical Activity and Function: factors affecting access to physical activity, nutrition-related ADLs and IADLs  Anthropometric Measurements: weight, weight change, body mass index  Biochemical Data, Medical Tests and Procedures: lipid profile, inflammatory profile, glucose/endocrine profile, gastrointestinal profile, electrolyte and renal panel  Nutrition-Focused Physical Findings: skin, head and eyes, extremities, muscles and bones, overall appearance     Nutrition Follow-Up  RD Follow-up?: Yes  Analia Grimes, student dietitian  Spring Wilson, ELENAN, LDN

## 2023-06-15 NOTE — PROGRESS NOTES
[facility] - Intensive Care ([facility]  Palliative Medicine  Progress Note    Patient Name: Nalini Varma  MRN: [id number]  Admission Date: 6/4/2023  Hospital Length of Stay: 10 days  Code Status: DNR   Attending Provider: Breana Mckinney MD  Consulting Provider: Lucho Gutierrez MD  Primary Care Physician: Yane Sahni MD  Principal Problem:Acute on chronic respiratory failure with hypoxia and hypercapnia    Patient information was obtained from patient, relative(s), past medical records and primary team.      Assessment/Plan:     Pulmonary  * Acute on chronic respiratory failure with hypoxia and hypercapnia  - some improvement with diuresis with lowering oxygen requirement, but remains with significant work of breathing which is only aided with morphine   - has PRN opioids (IV morphine) to help vs her dyspnea. If increasing frequency becomes needed or if transitioned to comfort care, could increase this to Q3H PRN if needed to further help with symptoms.       Chronic obstructive pulmonary disease  - Significant underlying disease; on home oxygen       Cardiac/Vascular  Chronic diastolic (congestive) heart failure  - Noted history; EF 60% and has Grade III left ventricular diastolic dysfunction.  - diuresis per primary team and PCCM     Renal/  CKD (chronic kidney disease) stage 3, GFR 30-59 ml/min  - Noted underlying disease    Oncology  Recurrent adenocarcinoma of left lung  - Per Dr. Gilmore during this admission, patient has completed her course of treatment and will need follow up with oncology outpatient on discharge    Palliative Care  Advance care planning  6/15/2023:  - chart and interval history reviewed. Discussed with primary team and bedside nurse extensively.   - seen at bedside with palliative RN Amanda  - patients son Romain and daughter in law Rose were present at bedside  - patient was for the most part not able to participate in the conversation as she focused on her breathing efforts. She  "was intermittently able to open here eyes and would respond to some of the questions asked.   - she appeared very fatigued   - Romain was provided with an overall medical update   - Romain and Rose shared that they went to her house and could not believe the incredibly poor conditions she was living in  - shared their frustration that she would never allow them to be involved in her care and talked about how she used to live in [place] near them but moved to [place] to get away and have her independence   - seems she never wanted to worry about her or burden them with her medical on tomi  - Romain shared that he feels it is clear she has been through so much and his main hope and goal is for her to be comfortable and not suffering with whatever time she has left  - he is aware of her difficulty with breathing and the morphine she is being needed to ease her dyspnea  - he is going to talk with his brother Augie and think further regarding possibility of transitioning her to comfort focused care. Made him aware of what that entails and what hospice entails.   - they are hopeful that if comfort care/hospice is decided on that inpatient hospice will be a possibility as they were aware she did not want to be in a nursing home.   - made him aware that this would be for the inpatient hospice to determine and that the teams are here to answer any questions or concerns they may have during this process. Made him aware that given her tachycardia, increased work of breathing, mental status, somnolence and increasing IV morphine needs in the setting of her COPD and HFpef (grade 3 diastolic) she likely needs the increased support of inpatient hospice for management of symptoms if that is decided on as the next step.   - at bedside, attempted to talk with patient to let her know that everyone is looking out for her and that we are focusing on making sure she is not suffering. She said "good" as a response. Asked her if she was okay " "with her sons taking the lead on her medical decisions and she said yes. This eased Brkendra mind to some degree as he takes on this responsibility. Made them aware that given she is unable to make medical decisions for herself in this current state, they are her medical decision makers. Made Romain aware of my conversation with Augie on Tuesday where Augie deferred such a duty to Romain. Romain stated he understands and will make a joint decision with Augie and keep him in the loop regarding on tomi.   [] Romain to talk with Augie to update him and decide on next steps with possibility being for transition to comfort care.   [] DNR/DNI and has e-lapost    See prior notes on file for previous ACP discussions.     Other  Debility  - Mainly bed/wheelchair bound  - Transfers herself in/out of wheelchair  - plan was for SNF prior to this decompensation         I will follow-up with patient. Please contact us if you have any additional questions.    Subjective:     Chief Complaint:   Chief Complaint   Patient presents with    Leg Pain     Pt presents with bilateral leg pain x 16 hours. Pt denies recent trauma/injury.        HPI:   Per H&P: " The patient is a 72 y.o. female with a past medical history of Chronic obstructive pulmonary disease, Hyperlipidemia, Iron deficiency anemia, and Stage III CKD who presents for evaluation of bilateral leg pain. She reports she has been wheelchair bound for a year and states that she has had increased difficulty caring for herself over the last week. She reports she has been unable to transfer herself to her bed or to the toilet so she has been using a diaper. She called EMS today after she slid out of her wheelchair and was unable to get back into it without assistance. The patient reports she has been taking hydrocodone for the pain.  On initial workup, the patient is noted to have a low Na of 128 and appears disheveled.  The patient states she came to the hospital because she can't take care " "of herself anymore and would like placement for assistance."    Palliative care consulted for assistance with advanced care planning and support.       Hospital Course:  No notes on file      Medications:  Scheduled Meds:   diltiaZEM  180 mg Oral Daily    DULoxetine  60 mg Oral Daily    fluticasone furoate-vilanteroL  1 puff Inhalation Daily    metoprolol succinate  50 mg Oral Daily    miconazole nitrate 2%   Topical (Top) BID    mupirocin   Nasal BID    torsemide  20 mg Oral Daily     PRN Meds:acetaminophen, albuterol-ipratropium, hydrOXYzine HCL, lorazepam, morphine, naloxone, ondansetron, sodium chloride 0.9%, sodium chloride 3%    Objective:     Vital Signs (Most Recent):  Temp: 98.3 °F (36.8 °C) (06/15/23 1200)  Pulse: 100 (06/15/23 1219)  Resp: (!) 30 (06/15/23 1219)  BP: (!) 163/71 (06/15/23 1200)  SpO2: 95 % (06/15/23 1219) Vital Signs (24h Range):  Temp:  [97.4 °F (36.3 °C)-98.7 °F (37.1 °C)] 98.3 °F (36.8 °C)  Pulse:  [] 100  Resp:  [13-38] 30  SpO2:  [84 %-96 %] 95 %  BP: (135-185)/(64-96) 163/71     Weight: 88.5 kg (195 lb 1.7 oz)  Body mass index is 31.51 kg/m².       Physical Exam  Constitutional:       Comments: Lying reclined in bed, intermittently would say a few words when asked, ill appearing   HENT:      Mouth/Throat:      Mouth: Mucous membranes are dry.   Eyes:      Comments: Eyes mostly closed during conversation   Cardiovascular:      Rate and Rhythm: Tachycardia present.   Pulmonary:      Comments: Increased work of breathing throughout visit  Skin:     General: Skin is warm.   Neurological:      Comments: Unable to have a consistent or coherent conversation. Does not have understanding or insight into her medical on tomi at this time          Review of Symptoms        Living Arrangements:  Lives alone    Psychosocial/Cultural:   See Palliative Psychosocial Note: Yes  - lives alone at home  - has a friend (Zafar) that comes and checks on her  - mainly in wheelchair/ bed bound   - " enjoys staying home and watching tv  - 2 sons who live in   **Primary  to Follow**  Palliative Care  Consult: No      Advance Care Planning   Advance Directives:   [place]POST: Yes    Do Not Resuscitate Status: Yes    Medical Power of : Yes      Decision Making:  Patient unable to communicate due to disease severity/cognitive impairment and Family answered questions  Goals of Care: The family endorses that what is most important right now is to focus on comfort and QOL.     Accordingly, we have decided that the best plan to meet the family's goals includes continue with current management as family discusses together possible transition to comfort care.          Significant Labs: reviewed  CBC:   Recent Labs   Lab 06/15/23  0307   WBC 12.03   HGB 9.8*   HCT 30.2*   MCV 97        BMP:  Recent Labs   Lab 06/15/23  0307   GLU 92      K 3.8   CL 96   CO2 29   BUN 32*   CREATININE 0.9   CALCIUM 9.1   MG 2.0     LFT:  Lab Results   Component Value Date    AST 45 (H) 06/15/2023    ALKPHOS 137 (H) 06/15/2023    BILITOT 0.5 06/15/2023     Albumin:   Albumin   Date Value Ref Range Status   06/15/2023 1.7 (L) 3.5 - 5.2 g/dL Final     Protein:   Total Protein   Date Value Ref Range Status   06/15/2023 5.7 (L) 6.0 - 8.4 g/dL Final     Lactic acid:   Lab Results   Component Value Date    LACTATE 1.1 02/13/2023    LACTATE 0.7 01/17/2023       Significant Imaging: reviewed    > 50% of 35 min visit spent in chart review, face to face discussion of symptom assessment, coordination of care with other specialists, and d/c planning  16 minutes ACP time spent: goals of care, emotional support, formulating and communication prognosis and goals of care, exploring burden/ benefit of various approaches of treatment.       Lucho Gutierrez MD  Palliative Medicine  [employer]  Intensive Care [facility]

## 2023-06-15 NOTE — ASSESSMENT & PLAN NOTE
6/15/2023:  - chart and interval history reviewed. Discussed with primary team and bedside nurse extensively.   - seen at bedside with palliative RN Amanda  - patients son Romain and daughter in law Rose were present at bedside  - patient was for the most part not able to participate in the conversation as she focused on her breathing efforts. She was intermittently able to open here eyes and would respond to some of the questions asked.   - she appeared very fatigued   - Romain was provided with an overall medical update   - Romain and Rose shared that they went to her house and could not believe the incredibly poor conditions she was living in  - shared their frustration that she would never allow them to be involved in her care and talked about how she used to live in [place] near them but moved to [place] to get away and have her independence   - seems she never wanted to worry about her or burden them with her medical on tomi  - Romain shared that he feels it is clear she has been through so much and his main hope and goal is for her to be comfortable and not suffering with whatever time she has left  - he is aware of her difficulty with breathing and the morphine she is being needed to ease her dyspnea  - he is going to talk with his brother Augie and think further regarding possibility of transitioning her to comfort focused care. Made him aware of what that entails and what hospice entails.   - they are hopeful that if comfort care/hospice is decided on that inpatient hospice will be a possibility as they were aware she did not want to be in a nursing home.   - made him aware that this would be for the inpatient hospice to determine and that the teams are here to answer any questions or concerns they may have during this process. Made him aware that given her tachycardia, increased work of breathing, mental status, somnolence and increasing IV morphine needs in the setting of her COPD and HFpef (grade 3  "diastolic) she likely needs the increased support of inpatient hospice for management of symptoms if that is decided on as the next step.   - at bedside, attempted to talk with patient to let her know that everyone is looking out for her and that we are focusing on making sure she is not suffering. She said "good" as a response. Asked her if she was okay with her sons taking the lead on her medical decisions and she said yes. This eased Brkendra mind to some degree as he takes on this responsibility. Made them aware that given she is unable to make medical decisions for herself in this current state, they are her medical decision makers. Made Romain aware of my conversation with Augie on Tuesday where Augie deferred such a duty to Romain. Romain stated he understands and will make a joint decision with Augie and keep him in the loop regarding on tomi.   [] Romain to talk with Augie to update him and decide on next steps with possibility being for transition to comfort care.   [] DNR/DNI and has e-lapost    See prior notes on file for previous ACP discussions.   "

## 2023-06-15 NOTE — SUBJECTIVE & OBJECTIVE
Interval History: Patient lethargic after getting morphine, unable to talk to me.  She is technically on clear liquids but not taking anything by mouth now.  Was able to swallow some pills earlier according to bedside nurse.  Son and daughter in law at bedside.  Long discussion regarding hospice.  Given her inability to eat and need for IV morphine frequently, inpatient hospice would be appropriate.  Family agrees.  Bed availability currently scarce, unclear how long this will take.    Review of Systems   Unable to perform ROS: Acuity of condition   Objective:     Vital Signs (Most Recent):  Temp: 98.3 °F (36.8 °C) (06/15/23 1200)  Pulse: 100 (06/15/23 1219)  Resp: (!) 40 (06/15/23 1647)  BP: (!) 163/71 (06/15/23 1200)  SpO2: 95 % (06/15/23 1219) Vital Signs (24h Range):  Temp:  [97.4 °F (36.3 °C)-98.3 °F (36.8 °C)] 98.3 °F (36.8 °C)  Pulse:  [] 100  Resp:  [15-40] 40  SpO2:  [91 %-96 %] 95 %  BP: (141-185)/(64-96) 163/71     Weight: 88.5 kg (195 lb 1.7 oz)  Body mass index is 31.51 kg/m².    Intake/Output Summary (Last 24 hours) at 6/15/2023 1800  Last data filed at 6/15/2023 0921  Gross per 24 hour   Intake 120 ml   Output 250 ml   Net -130 ml         Physical Exam  Constitutional:       Comments: Lethargic, ill-appearing, mumbling   Cardiovascular:      Rate and Rhythm: Normal rate and regular rhythm.      Heart sounds: Normal heart sounds. No murmur heard.    No gallop.   Pulmonary:      Breath sounds: Rhonchi and rales present.      Comments: Poor effort, somnolent  Abdominal:      General: Bowel sounds are normal.      Palpations: Abdomen is soft.   Skin:     General: Skin is warm and dry.           Significant Labs: All pertinent labs within the past 24 hours have been reviewed.    Significant Imaging: I have reviewed all pertinent imaging results/findings within the past 24 hours.

## 2023-06-15 NOTE — ASSESSMENT & PLAN NOTE
"Patient is identified as having Diastolic (HFpEF) heart failure that is Chronic. CHF is currently controlled. Latest ECHO performed and demonstrates- Results for orders placed during the hospital encounter of 02/13/23    Echo    Interpretation Summary  · The left ventricle is normal in size with concentric remodeling and normal systolic function.  · Mild left atrial enlargement.  · The estimated ejection fraction is 60%.  · Grade III left ventricular diastolic dysfunction.  · Normal right ventricular size with normal right ventricular systolic function.  · There is mild aortic valve stenosis.  · Aortic valve area is 1.50 cm2; peak velocity is 3.06 m/s; mean gradient is 22 mmHg.  · Mild tricuspid regurgitation.  . Continue Beta Blocker and ACE/ARB and monitor clinical status closely. Monitor on telemetry. Patient is off CHF pathway.  Monitor strict Is&Os and daily weights.  Place on fluid restriction of 1.5 L.  on diet for CHF.    See "acute on chronic respiratory failure"  Now on comfort care status  "

## 2023-06-15 NOTE — PROGRESS NOTES
"[facility] Intensive Care [facility] Medicine  Progress Note    Patient Name: Nalini Varma  MRN: [id number]  Patient Class: IP- Inpatient   Admission Date: 6/4/2023  Length of Stay: 10 days  Attending Physician: Breana Mckinney MD  Primary Care Provider: Yane Sahni MD        Subjective:     Principal Problem:Acute on chronic respiratory failure with hypoxia and hypercapnia        HPI:  From H&P by Emigdio Holly NP:  "The patient is a 72 y.o. female with a past medical history of Chronic obstructive pulmonary disease, Hyperlipidemia, Iron deficiency anemia, and Stage III CKD who presents for evaluation of bilateral leg pain. She reports she has been wheelchair bound for a year and states that she has had increased difficulty caring for herself over the last week. She reports she has been unable to transfer herself to her bed or to the toilet so she has been using a diaper. She called EMS today after she slid out of her wheelchair and was unable to get back into it without assistance. The patient reports she has been taking hydrocodone for the pain.  On initial workup, the patient is noted to have a low Na of 128 and appears disheveled.  The patient states she came to the hospital because she can't take care of herself anymore and would like placement for assistance."      Overview/Hospital Course:  Patient was admitted with UTI and failure to thrive and was treated for such. Improved and was stable for discharge, prolonged waiting for placement and the patient was placed on the virtual medicine team. 6/11 rapid response called for severe hypoxia and AMS and cxr revealed a white out left lung with pulmonary edema of the right lung. She was placed on high flow oxygen and placed in the ICU. She has had severe agitation and disorientation and did not tolerate BiPAP or even face mask. She received IV diuresis and monitoring in the ICU. Mental status waxed and waned.      Discussed with oncology, " patient was not a candidate for further treatment for lung cancer with chemotherapy or radiation.  In terms of her COPD/chronic respiratory failure she is considered end stage and is still smoking.  Palliative care was consulted, her son and daughter in law visited with her and were involved in the decision making process as patient was unable to make decisions and indicated her son should do it for her.  She became increasingly lethargic, was unable to take in anything by mouth and required morphine injections frequently for dyspnea/pain.  Decision was made to pursue inpatient hospice as she appeared to be close to death and in need of comfort care.  Transitioned to comfort care here on 6/15 while hospice was consulted.      Interval History: Patient lethargic after getting morphine, unable to talk to me.  She is technically on clear liquids but not taking anything by mouth now.  Was able to swallow some pills earlier according to bedside nurse.  Son and daughter in law at bedside.  Long discussion regarding hospice.  Given her inability to eat and need for IV morphine frequently, inpatient hospice would be appropriate.  Family agrees.  Bed availability currently scarce, unclear how long this will take.    Review of Systems   Unable to perform ROS: Acuity of condition   Objective:     Vital Signs (Most Recent):  Temp: 98.3 °F (36.8 °C) (06/15/23 1200)  Pulse: 100 (06/15/23 1219)  Resp: (!) 40 (06/15/23 1647)  BP: (!) 163/71 (06/15/23 1200)  SpO2: 95 % (06/15/23 1219) Vital Signs (24h Range):  Temp:  [97.4 °F (36.3 °C)-98.3 °F (36.8 °C)] 98.3 °F (36.8 °C)  Pulse:  [] 100  Resp:  [15-40] 40  SpO2:  [91 %-96 %] 95 %  BP: (141-185)/(64-96) 163/71     Weight: 88.5 kg (195 lb 1.7 oz)  Body mass index is 31.51 kg/m².    Intake/Output Summary (Last 24 hours) at 6/15/2023 1800  Last data filed at 6/15/2023 0921  Gross per 24 hour   Intake 120 ml   Output 250 ml   Net -130 ml         Physical Exam  Constitutional:        Comments: Lethargic, ill-appearing, mumbling   Cardiovascular:      Rate and Rhythm: Normal rate and regular rhythm.      Heart sounds: Normal heart sounds. No murmur heard.    No gallop.   Pulmonary:      Breath sounds: Rhonchi and rales present.      Comments: Poor effort, somnolent  Abdominal:      General: Bowel sounds are normal.      Palpations: Abdomen is soft.   Skin:     General: Skin is warm and dry.           Significant Labs: All pertinent labs within the past 24 hours have been reviewed.    Significant Imaging: I have reviewed all pertinent imaging results/findings within the past 24 hours.      Assessment/Plan:      * Acute on chronic respiratory failure with hypoxia and hypercapnia  Rapid response 6/11/23: Patient was on venturi mask 40% (8L) and desaturated to 50-60% on the floor while on virtual medicine service. Patient appeared cold and cyanotic but alert. Dr. Austin was present. Lung auscultation revealed diminished air entry to bilateral lungs, L>R. Given IV morphine 2mg x1. Patient was placed on high flow 12L, which saw Pox improving to 83%. Patient reports feeling better, color returned to face. Given acute desaturation requiring Vapotherm support patient transferred to ICU.      Plan:  - Whiteout left lung, afebrile without leukocytosis. H/O CHF and COPD and recurrent lung CA on recent chem/rads late 2022, unable to tolerate further treatment  - Continue oxygen support per pulm - remains on HFNC, does not tolerate BiPAP  - Patient ready for comfort care measures.  Inpatient hospice consulted.  Inreased frequency of IV morphine 4mg to q3 hours prn dyspnea/pain    Recurrent adenocarcinoma of left lung  Discussed with oncologist (Dr. Gilmore)  Patient diagnosed without biopsy but was presented at tumor board  Unable to tolerate more than a few sessions of chemotherapy/XRT  Although not considered stage IV she should quality for hospice as no further treatment is recommended currently due to her  "poor performance status.      Syndrome of inappropriate secretion of antidiuretic hormone  History of SIADH, monitor.   Sodium has improved with fluid restriction  Some improvement is due to dehydration    MDD (major depressive disorder), recurrent, in partial remission  Psych evaluated  Patient lethargic - will discontinue trazodone, Cymbalta    CKD (chronic kidney disease) stage 3, GFR 30-59 ml/min  Creatinine 1, at baseline  Cancel labs for comfort care    Debility  Patient with increasing weakness, appearance of inability to care for herself.  Reports becoming bedbound and now inability to transfer in and out of her wheelchair.    Discussed with Dr. Gilmore - patient's performance status does not allow for resumption of treatment for lung cancer and she is hospice appropriate despite not being in stage IV.  Palliative care following.    See above - patient now on comfort care measures awaiting hospice    Chronic diastolic (congestive) heart failure  Patient is identified as having Diastolic (HFpEF) heart failure that is Chronic. CHF is currently controlled. Latest ECHO performed and demonstrates- Results for orders placed during the hospital encounter of 02/13/23    Echo    Interpretation Summary  · The left ventricle is normal in size with concentric remodeling and normal systolic function.  · Mild left atrial enlargement.  · The estimated ejection fraction is 60%.  · Grade III left ventricular diastolic dysfunction.  · Normal right ventricular size with normal right ventricular systolic function.  · There is mild aortic valve stenosis.  · Aortic valve area is 1.50 cm2; peak velocity is 3.06 m/s; mean gradient is 22 mmHg.  · Mild tricuspid regurgitation.  . Continue Beta Blocker and ACE/ARB and monitor clinical status closely. Monitor on telemetry. Patient is off CHF pathway.  Monitor strict Is&Os and daily weights.  Place on fluid restriction of 1.5 L.  on diet for CHF.    See "acute on chronic " "respiratory failure"  Now on comfort care status    Generalized anxiety disorder        Hyperlipidemia  Continue Lipitor      Tobacco dependence        Essential hypertension  BP improved  Comfort care measures    Chronic obstructive pulmonary disease  Continue Breo as interchange  Duonebs PRN  Patient is a current smoker        VTE Risk Mitigation (From admission, onward)         Ordered     IP VTE HIGH RISK PATIENT  Once         06/05/23 0216                Discharge Planning   FELICIANO:      Code Status: DNR   Is the patient medically ready for discharge?: Yes    Reason for patient still in hospital (select all that apply): Pending disposition  Discharge Plan A: Home                  Breana Maddox MD  Department of Hospital Medicine   [facility] - Intensive Care ([facility])  "

## 2023-06-15 NOTE — ASSESSMENT & PLAN NOTE
Discussed with oncologist (Dr. Gilmore)  Patient diagnosed without biopsy but was presented at tumor board  Unable to tolerate more than a few sessions of chemotherapy/XRT  Although not considered stage IV she should quality for hospice as no further treatment is recommended currently due to her poor performance status.

## 2023-06-15 NOTE — PROGRESS NOTES
O2 Device/Concentration: Flow (L/min): 25, Oxygen Concentration (%): 40,  , Flow (L/min): 25    Plan of Care:

## 2023-06-15 NOTE — PLAN OF CARE
Recommendations  1) D/t overall severity of patient condition, nutrition recommendations not appropriate at this time. RD will continue to follow. Please consult if alternate recommendations are needed     Goals: Patient to have diet re-initiated per medical status  Nutrition Goal Status: goal not met  Communication of RD Recs: reviewed with RN (Caitlin)

## 2023-06-15 NOTE — NURSING
"  ICU PLAN OF CARE NOTE    SHIFT EVENTS:  POC reviewed. Cardiac monitoring maintained. Episodes of ST and unsustained afib noted. NP notified. Vapotherm titrated prn. PRN medication administered for complaints of dyspnea. Bed in locked and low position. Side rails in place x2. Bed alarm set. Call light within reach. Questions and concerns addressed.       Dx: Acute on chronic respiratory failure with hypoxia and hypercapnia    Vital Signs: BP (!) 161/70   Pulse 105   Temp 97.8 °F (36.6 °C) (Oral)   Resp 18   Ht 5' 5.98" (1.676 m)   Wt 88.5 kg (195 lb 1.7 oz)   SpO2 (!) 94%   BMI 31.51 kg/m²     Neuro: AAO x4, Follows Commands, and Moves All Extremities    Respiratory: HFNC    Cardiac: NSR/ST/ In and out of afib. NP notified.    Diet: 2G Sodium     SKIN NOTE:  Skin precautions maintained including:  Sacrum and heels with foam dressing in place for pressure protection. Frequent weight shift assistance provided. Patient turned Q2 hr to prevent breakdown. Bed plugged in and mattress inflated. Adhesive use limited. Heels elevated off bed. Pressure points protected and positioning supports utilized.  Skin-to-device areas padded.       "

## 2023-06-15 NOTE — PLAN OF CARE
Melo of [facility] told [facility] that the patient has been accepted for inpatient hospice with them.  However, won't have a bed until possibly tomorrow.  Melo will call the son tomorrow morning to get consents signed.  CM following.

## 2023-06-15 NOTE — PROGRESS NOTES
[facility] Intensive Care [facility])  Wound Care    Patient Name:  Nalini Varma   MRN:  [id number]  Date: 6/15/2023  Diagnosis: Acute on chronic respiratory failure with hypoxia and hypercapnia    History:     Past Medical History:   Diagnosis Date    Anxiety     Cervical spinal stenosis     Choledocholithiasis     Chronic obstructive pulmonary disease 03/16/2016    Degenerative disc disease of cervical spine     Diverticulosis 04/24/2018    History of alcohol abuse 03/02/2021    History of gastric ulcer     History of L3 compression fracture 12/19/2018    History of lung cancer     s/p completion of XRT in 10/2020    Hyperlipidemia     Iron deficiency anemia     Osteoarthritis     Stage III CKD 2017       Social History     Socioeconomic History    Marital status: Single    Number of children: 2   Occupational History    Occupation: unemployed   Tobacco Use    Smoking status: Every Day     Packs/day: 1.00     Years: 53.00     Pack years: 53.00     Types: Cigarettes     Start date: 4/30/1967    Smokeless tobacco: Never    Tobacco comments:     I had quit for about 6 months this past year. Then massive stress and started back up sometimes   Substance and Sexual Activity    Alcohol use: Yes     Alcohol/week: 7.0 standard drinks     Types: 7 Drinks containing 0.5 oz of alcohol per week     Comment: at least 1 cocktail a day- vodka    Drug use: No    Sexual activity: Not Currently     Partners: Male     Birth control/protection: Abstinence, Post-menopausal   Other Topics Concern    Are you pregnant or think you may be? No   Social History Narrative    Originally from [place]    Living in [facility] since 1998    [facility] in [facility]     Social Determinants of Health     Financial Resource Strain: Medium Risk    Difficulty of Paying Living Expenses: Somewhat hard   Food Insecurity: Food Insecurity Present    Worried About Running Out of Food in the Last Year: Sometimes true    Ran Out of Food in the Last Year: Sometimes  true   Transportation Needs: Unmet Transportation Needs    Lack of Transportation (Medical): Yes    Lack of Transportation (Non-Medical): Yes   Physical Activity: Insufficiently Active    Days of Exercise per Week: 4 days    Minutes of Exercise per Session: 30 min   Stress: No Stress Concern Present    Feeling of Stress : Only a little   Social Connections: Socially Isolated    Frequency of Communication with Friends and Family: Once a week    Frequency of Social Gatherings with Friends and Family: More than three times a week    Attends [employer] Services: Never    Active Member of Clubs or Organizations: No    Attends Club or Organization Meetings: Never    Marital Status:    Housing Stability: High Risk    Unable to Pay for Housing in the Last Year: Yes    Number of Places Lived in the Last Year: 2    Unstable Housing in the Last Year: Yes       Precautions:     Allergies as of 06/04/2023 - Reviewed 06/04/2023   Allergen Reaction Noted    Wellbutrin [bupropion hcl] Other (See Comments) 06/28/2017    Zoloft [sertraline] Other (See Comments) 06/28/2017    Bananas [banana]  08/27/2021       [facility] Assessment Details/Treatment     Wound care follow up:     Met with patient this afternoon. Nursing reported patient is now comfort care. Assessed patient's foot and skin folds. Incontinence dermatitis to groin and skin folds has resolved. Right foot with intact scab. Attempted to turn patient, but she cried out in pain. Notified nursing that I did not turn patient as not wanting to cause unnecessary pain/discomfort for her. Wound care to continue to assist with pt prn.      06/15/2023

## 2023-06-15 NOTE — ASSESSMENT & PLAN NOTE
- some improvement with diuresis with lowering oxygen requirement, but remains with significant work of breathing which is only aided with morphine   - has PRN opioids (IV morphine) to help vs her dyspnea. If increasing frequency becomes needed could increase this to Q3H PRN if needed

## 2023-06-15 NOTE — SUBJECTIVE & OBJECTIVE
Medications:  Scheduled Meds:   diltiaZEM  180 mg Oral Daily    DULoxetine  60 mg Oral Daily    fluticasone furoate-vilanteroL  1 puff Inhalation Daily    metoprolol succinate  50 mg Oral Daily    miconazole nitrate 2%   Topical (Top) BID    mupirocin   Nasal BID    torsemide  20 mg Oral Daily     PRN Meds:acetaminophen, albuterol-ipratropium, hydrOXYzine HCL, lorazepam, morphine, naloxone, ondansetron, sodium chloride 0.9%, sodium chloride 3%    Objective:     Vital Signs (Most Recent):  Temp: 98.3 °F (36.8 °C) (06/15/23 1200)  Pulse: 100 (06/15/23 1219)  Resp: (!) 30 (06/15/23 1219)  BP: (!) 163/71 (06/15/23 1200)  SpO2: 95 % (06/15/23 1219) Vital Signs (24h Range):  Temp:  [97.4 °F (36.3 °C)-98.7 °F (37.1 °C)] 98.3 °F (36.8 °C)  Pulse:  [] 100  Resp:  [13-38] 30  SpO2:  [84 %-96 %] 95 %  BP: (135-185)/(64-96) 163/71     Weight: 88.5 kg (195 lb 1.7 oz)  Body mass index is 31.51 kg/m².       Physical Exam  Constitutional:       Comments: Lying reclined in bed, intermittently would say a few words when asked, ill appearing   HENT:      Mouth/Throat:      Mouth: Mucous membranes are dry.   Eyes:      Comments: Eyes mostly closed during conversation   Cardiovascular:      Rate and Rhythm: Tachycardia present.   Pulmonary:      Comments: Increased work of breathing throughout visit  Skin:     General: Skin is warm.   Neurological:      Comments: Unable to have a consistent or coherent conversation. Does not have understanding or insight into her medical on tomi at this time          Review of Symptoms        Living Arrangements:  Lives alone    Psychosocial/Cultural:   See Palliative Psychosocial Note: Yes  - lives alone at home  - has a friend (Zafar) that comes and checks on her  - mainly in wheelchair/ bed bound   - enjoys staying home and watching tv  - 2 sons who live in [place]  **Primary  to Follow**  Palliative Care  Consult: No      Advance Care Planning   Advance Directives:    [place]: Yes    Do Not Resuscitate Status: Yes    Medical Power of : Yes      Decision Making:  Patient unable to communicate due to disease severity/cognitive impairment and Family answered questions  Goals of Care: The family endorses that what is most important right now is to focus on comfort and QOL.     Accordingly, we have decided that the best plan to meet the family's goals includes continue with current management as family discusses together possible transition to comfort care.          Significant Labs: reviewed  CBC:   Recent Labs   Lab 06/15/23  0307   WBC 12.03   HGB 9.8*   HCT 30.2*   MCV 97        BMP:  Recent Labs   Lab 06/15/23  0307   GLU 92      K 3.8   CL 96   CO2 29   BUN 32*   CREATININE 0.9   CALCIUM 9.1   MG 2.0     LFT:  Lab Results   Component Value Date    AST 45 (H) 06/15/2023    ALKPHOS 137 (H) 06/15/2023    BILITOT 0.5 06/15/2023     Albumin:   Albumin   Date Value Ref Range Status   06/15/2023 1.7 (L) 3.5 - 5.2 g/dL Final     Protein:   Total Protein   Date Value Ref Range Status   06/15/2023 5.7 (L) 6.0 - 8.4 g/dL Final     Lactic acid:   Lab Results   Component Value Date    LACTATE 1.1 02/13/2023    LACTATE 0.7 01/17/2023       Significant Imaging: reviewed

## 2023-06-16 VITALS
SYSTOLIC BLOOD PRESSURE: 176 MMHG | BODY MASS INDEX: 31.36 KG/M2 | OXYGEN SATURATION: 97 % | HEART RATE: 119 BPM | HEIGHT: 66 IN | TEMPERATURE: 98 F | WEIGHT: 195.13 LBS | RESPIRATION RATE: 16 BRPM | DIASTOLIC BLOOD PRESSURE: 81 MMHG

## 2023-06-16 PROBLEM — Z51.5 END OF LIFE CARE: Status: ACTIVE | Noted: 2023-06-16

## 2023-06-16 LAB — SARS-COV-2 RDRP RESP QL NAA+PROBE: NEGATIVE

## 2023-06-16 PROCEDURE — 94761 N-INVAS EAR/PLS OXIMETRY MLT: CPT

## 2023-06-16 PROCEDURE — 99238 PR HOSPITAL DISCHARGE DAY,<30 MIN: ICD-10-PCS | Mod: ,,, | Performed by: HOSPITALIST

## 2023-06-16 PROCEDURE — 63600175 PHARM REV CODE 636 W HCPCS: Performed by: HOSPITALIST

## 2023-06-16 PROCEDURE — 27000221 HC OXYGEN, UP TO 24 HOURS

## 2023-06-16 PROCEDURE — 99232 SBSQ HOSP IP/OBS MODERATE 35: CPT | Mod: ,,, | Performed by: INTERNAL MEDICINE

## 2023-06-16 PROCEDURE — 99238 HOSP IP/OBS DSCHRG MGMT 30/<: CPT | Mod: ,,, | Performed by: HOSPITALIST

## 2023-06-16 PROCEDURE — 63600175 PHARM REV CODE 636 W HCPCS: Performed by: INTERNAL MEDICINE

## 2023-06-16 PROCEDURE — 94640 AIRWAY INHALATION TREATMENT: CPT

## 2023-06-16 PROCEDURE — 25000242 PHARM REV CODE 250 ALT 637 W/ HCPCS: Performed by: HOSPITALIST

## 2023-06-16 PROCEDURE — 25000003 PHARM REV CODE 250: Performed by: INTERNAL MEDICINE

## 2023-06-16 PROCEDURE — 99900035 HC TECH TIME PER 15 MIN (STAT)

## 2023-06-16 PROCEDURE — 99497 ADVNCD CARE PLAN 30 MIN: CPT | Mod: ,,, | Performed by: INTERNAL MEDICINE

## 2023-06-16 PROCEDURE — 99497 PR ADVNCD CARE PLAN 30 MIN: ICD-10-PCS | Mod: ,,, | Performed by: INTERNAL MEDICINE

## 2023-06-16 PROCEDURE — 99232 PR SUBSEQUENT HOSPITAL CARE,LEVL II: ICD-10-PCS | Mod: ,,, | Performed by: INTERNAL MEDICINE

## 2023-06-16 PROCEDURE — U0002 COVID-19 LAB TEST NON-CDC: HCPCS | Performed by: HOSPITALIST

## 2023-06-16 PROCEDURE — 25000003 PHARM REV CODE 250: Performed by: HOSPITALIST

## 2023-06-16 RX ORDER — ALBUTEROL SULFATE 2.5 MG/.5ML
2.5 SOLUTION RESPIRATORY (INHALATION) EVERY 4 HOURS PRN
Status: DISCONTINUED | OUTPATIENT
Start: 2023-06-16 | End: 2023-06-16

## 2023-06-16 RX ORDER — MORPHINE SULFATE 2 MG/ML
6 INJECTION, SOLUTION INTRAMUSCULAR; INTRAVENOUS
Status: DISCONTINUED | OUTPATIENT
Start: 2023-06-16 | End: 2023-06-16

## 2023-06-16 RX ORDER — MORPHINE SULFATE 2 MG/ML
2 INJECTION, SOLUTION INTRAMUSCULAR; INTRAVENOUS
Status: DISCONTINUED | OUTPATIENT
Start: 2023-06-16 | End: 2023-06-16

## 2023-06-16 RX ORDER — MORPHINE SULFATE 4 MG/ML
8 INJECTION, SOLUTION INTRAMUSCULAR; INTRAVENOUS
Status: DISCONTINUED | OUTPATIENT
Start: 2023-06-16 | End: 2023-06-16 | Stop reason: HOSPADM

## 2023-06-16 RX ADMIN — IPRATROPIUM BROMIDE AND ALBUTEROL SULFATE 3 ML: .5; 3 SOLUTION RESPIRATORY (INHALATION) at 07:06

## 2023-06-16 RX ADMIN — MORPHINE SULFATE 6 MG: 2 INJECTION, SOLUTION INTRAMUSCULAR; INTRAVENOUS at 10:06

## 2023-06-16 RX ADMIN — IPRATROPIUM BROMIDE AND ALBUTEROL SULFATE 3 ML: .5; 3 SOLUTION RESPIRATORY (INHALATION) at 12:06

## 2023-06-16 RX ADMIN — MORPHINE SULFATE 8 MG: 4 INJECTION, SOLUTION INTRAMUSCULAR; INTRAVENOUS at 12:06

## 2023-06-16 RX ADMIN — MICONAZOLE NITRATE: 20 OINTMENT TOPICAL at 08:06

## 2023-06-16 RX ADMIN — MUPIROCIN: 20 OINTMENT TOPICAL at 08:06

## 2023-06-16 RX ADMIN — MORPHINE SULFATE 4 MG: 4 INJECTION, SOLUTION INTRAMUSCULAR; INTRAVENOUS at 08:06

## 2023-06-16 RX ADMIN — MORPHINE SULFATE 4 MG: 4 INJECTION, SOLUTION INTRAMUSCULAR; INTRAVENOUS at 05:06

## 2023-06-16 RX ADMIN — MORPHINE SULFATE 4 MG/HR: 10 INJECTION, SOLUTION INTRAMUSCULAR; INTRAVENOUS at 11:06

## 2023-06-16 RX ADMIN — LORAZEPAM 1 MG: 2 INJECTION INTRAMUSCULAR; INTRAVENOUS at 07:06

## 2023-06-16 NOTE — PLAN OF CARE
Pt to be transported to [facility] w/ PIV and samy. D/C Morphine gtt and emptied in waste bin with another nurse ANDERSON Kimball. Waste form sent to pharmacy. Pt to be transported via stretcher. Sammy, son, notified of pt transport to [facility].

## 2023-06-16 NOTE — NURSING
Patient been transferred to the floor in room no B316.Informed to his son Sammy about her transfer and her plan of care.

## 2023-06-16 NOTE — PLAN OF CARE
Spoke with Melo of [facility] and they have a bed for patient now.  A rapid covid order has been sent to lab. We are awaiting those results in order for patient to go to [facility].  Son has signed consents.  Melo has the inpatient hospice order.

## 2023-06-16 NOTE — DISCHARGE SUMMARY
"[facility] Medicine  Discharge Summary      Patient Name: Nalini Varma  MRN: [id number]  JOHNNY: [id number]  Patient Class: IP- Inpatient  Admission Date: 6/4/2023  Hospital Length of Stay: 11 days  Discharge Date and Time: 6/16/2023  5:01 PM  Attending Physician: No att. providers found   Discharging Provider: Breana Maddox MD  Primary Care Provider: Yane Sahni MD    Primary Care Team: Networked reference to record PCT     HPI:   From H&P by Emigdio Holly NP:  "The patient is a 72 y.o. female with a past medical history of Chronic obstructive pulmonary disease, Hyperlipidemia, Iron deficiency anemia, and Stage III CKD who presents for evaluation of bilateral leg pain. She reports she has been wheelchair bound for a year and states that she has had increased difficulty caring for herself over the last week. She reports she has been unable to transfer herself to her bed or to the toilet so she has been using a diaper. She called EMS today after she slid out of her wheelchair and was unable to get back into it without assistance. The patient reports she has been taking hydrocodone for the pain.  On initial workup, the patient is noted to have a low Na of 128 and appears disheveled.  The patient states she came to the hospital because she can't take care of herself anymore and would like placement for assistance."            Hospital Course:   Patient was admitted with UTI and failure to thrive and was treated for such. Improved and was stable for discharge, prolonged waiting for placement and the patient was placed on the virtual medicine team. 6/11 rapid response called for severe hypoxia and AMS and cxr revealed a white out left lung with pulmonary edema of the right lung. She was placed on high flow oxygen and placed in the ICU. She has had severe agitation and disorientation and did not tolerate BiPAP or even face mask. She received IV diuresis and monitoring in the ICU. " Mental status waxed and waned.      Discussed with oncology, patient was not a candidate for further treatment for lung cancer with chemotherapy or radiation.  In terms of her COPD/chronic respiratory failure she is considered end stage and is still smoking.  Palliative care was consulted, her son and daughter in law visited with her and were involved in the decision making process as patient was unable to make decisions and indicated her son should do it for her.  She became increasingly lethargic, was unable to take in anything by mouth and required morphine injections frequently for dyspnea/pain.  Decision was made to pursue inpatient hospice as she appeared to be close to death and in need of comfort care.  Transitioned to comfort care here on 6/15 while hospice was consulted.  Ultimately she required a morphine drip to keep her breathing comfortably.  She was transferred to [facility] for inpatient hospice on 6/16.       Goals of Care Treatment Preferences:  Code Status: DNR       LaPOST: Yes         Consults:   Consults (From admission, onward)        Status Ordering Provider     Inpatient consult to Pulmonology  Once        Provider:  Justin Ellerman, MD    Completed OSCAR GRIFFITH     Inpatient virtual consult to Hospital Medicine  Once        Provider:  Herlinda Deleon MD    Completed CARINA PABLO     Inpatient consult to Social Work  Once        Provider:  (Not yet assigned)    Completed CARINA PABLO     Inpatient consult to Telemedicine - Psych  Once        Provider:  Orquidea Gonzalez MD    Completed CARINA PABLO     Inpatient consult to Registered Dietitian/Nutritionist  Once        Provider:  (Not yet assigned)    Completed ALEXA BATES     Inpatient consult to Palliative Care  Once        Provider:  Maya Hirsch DNP    Completed ALEXA BATES     Case Management/  Once        Provider:  (Not yet assigned)    Completed ABEBE ORDAZ            Final Active Diagnoses:     Diagnosis Date Noted POA    PRINCIPAL PROBLEM:  Acute on chronic respiratory failure with hypoxia and hypercapnia [J96.21, J96.22] 06/11/2023 Yes    End of life care [Z51.5] 06/16/2023 Not Applicable    Therapeutic opioid induced constipation [K59.03, T40.2X5A] 06/14/2023 No    Recurrent adenocarcinoma of left lung [C34.92] 10/28/2022 Yes    Syndrome of inappropriate secretion of antidiuretic hormone [E22.2] 10/02/2022 Yes    MDD (major depressive disorder), recurrent, in partial remission [F33.41] 06/17/2022 Yes    Debility [R53.81] 05/13/2021 Yes    Chronic diastolic (congestive) heart failure [I50.32] 09/17/2018 Yes    CKD (chronic kidney disease) stage 3, GFR 30-59 ml/min [N18.30] 2017 Yes    Generalized anxiety disorder [F41.1] 11/14/2016 Yes    Hyperlipidemia [E78.5] 09/20/2016 Yes    Essential hypertension [I10] 03/16/2016 Yes    Chronic obstructive pulmonary disease [J44.9] 03/16/2016 Yes    Tobacco dependence [F17.200] 03/16/2016 Yes      Problems Resolved During this Admission:    Diagnosis Date Noted Date Resolved POA    Agitation [R45.1] 06/12/2023 06/12/2023 No    Acute cystitis without hematuria [N30.00] 06/07/2023 06/14/2023 Yes    Hyponatremia [E87.1] 05/13/2021 06/07/2023 Yes       Discharged Condition: Terminally ill    Disposition: Hospice/Medical Facility    Follow Up:    Patient Instructions:      Reason for not Ordering Smoking Cessation Referral     Order Specific Question Answer Comments   Reason for not ordering: Not medically appropriate at this time      Reason for not Prescribing Nicotine Replacement     Order Specific Question Answer Comments   Reason for not Prescribing: Not medically appropriate at this time          Medications:  Reconciled Home Medications:      Medication List      START taking these medications    albuterol-ipratropium 2.5 mg-0.5 mg/3 mL nebulizer solution  Commonly known as: DUO-NEB  Take 3 mLs by nebulization every 6 (six) hours as needed for  Wheezing or Shortness of Breath. Rescue     LORazepam 2 mg/mL injection  Commonly known as: ATIVAN  Inject 0.5 mLs (1 mg total) into the vein every 4 (four) hours as needed (Anxiety).     morphine 2 mg/mL Soln  Inject 2 mLs (4 mg total) into the vein every 3 (three) hours as needed (Dyspnea, pain).        STOP taking these medications    ADVAIR -21 mcg/actuation Hfaa inhaler  Generic drug: fluticasone propion-salmeterol 115-21 mcg/dose     albuterol 90 mcg/actuation inhaler  Commonly known as: VENTOLIN HFA     ALPRAZolam 0.25 MG tablet  Commonly known as: XANAX     ascorbic acid (vitamin C) 250 MG tablet  Commonly known as: VITAMIN C     atorvastatin 40 MG tablet  Commonly known as: LIPITOR     azelastine 137 mcg (0.1 %) nasal spray  Commonly known as: ASTELIN     b complex vitamins capsule     biotin 10 mg Tab     CENTRUM SILVER WOMEN ORAL     cyanocobalamin (vitamin B-12) 1,000 mcg Tbsr     diltiaZEM 180 MG 24 hr capsule  Commonly known as: CARDIZEM CD     DULoxetine 60 MG capsule  Commonly known as: CYMBALTA     ferrous sulfate 325 mg (65 mg iron) Tab tablet  Commonly known as: FEOSOL     fluticasone propionate 50 mcg/actuation nasal spray  Commonly known as: FLONASE     gabapentin 300 MG capsule  Commonly known as: NEURONTIN     guaiFENesin 600 mg 12 hr tablet  Commonly known as: MUCINEX     HYDROcodone-acetaminophen  mg per tablet  Commonly known as: NORCO     INCRUSE ELLIPTA 62.5 mcg/actuation inhalation capsule  Generic drug: umeclidinium     lutein 40 mg Cap     magnesium oxide 400 mg (241.3 mg magnesium) tablet  Commonly known as: MAG-OX     metoprolol succinate 25 MG 24 hr tablet  Commonly known as: TOPROL-XL     ondansetron 8 MG Tbdl  Commonly known as: ZOFRAN-ODT     OYSTER SHELL CALCIUM 500 500 mg calcium (1,250 mg) tablet  Generic drug: calcium carbonate     pantoprazole 40 MG tablet  Commonly known as: PROTONIX     PROLIA 60 mg/mL Syrg  Generic drug: denosumab     promethazine 25 MG  tablet  Commonly known as: PHENERGAN     torsemide 20 MG Tab  Commonly known as: DEMADEX     traZODone 100 MG tablet  Commonly known as: DESYREL     vitamin D 1000 units Tab  Commonly known as: VITAMIN D3     ZINC ORAL            Indwelling Lines/Drains at time of discharge:   Lines/Drains/Airways     Drain  Duration                Urethral Catheter 06/15/23 2100 Silicone 16 Fr. <1 day                Time spent on the discharge of patient: >30 minutes         Breana Maddox MD  Department of Hospital Medicine  [facility] ([place])

## 2023-06-16 NOTE — PLAN OF CARE
06/16/23 1441   Final Note   Assessment Type Final Discharge Note   Anticipated Discharge Disposition HospiceMedic   Post-Acute Status   Post-Acute Authorization Hospice   Hospice Status Set-up Complete/Auth obtained   Discharge Delays None known at this time     Pt is clear for discharge from a CM perspective to [facility]. Approved for [facility]- [id number]

## 2023-06-16 NOTE — PLAN OF CARE
FLACA spoke with son Augie who stated Romain is the son that is here at this time and will sign the consents for inpatient hospice at [facility] tomorrow morning.  FLACA left Romain a message letting him know that the patient has been accepted at [facility] and they may possibly have a bed tomorrow.  FLACA left her office number and Melo from [facility] number.

## 2023-06-16 NOTE — PROGRESS NOTES
Patient unable to give a good effort to receive her meter dose inhaler. Rn notified. Her breathing pattern shallow,  RN notified. Responds to voice.PRN Aerosol given with no adverse reaction

## 2023-06-16 NOTE — ASSESSMENT & PLAN NOTE
6/16/23  - Chart and interval history reviewed; discussed pt in person with primary team. Pt much more symptomatic this morning.   - Visited with pt's very supportive son Sammy and daughter-in-law Brenda at bedside; she was unresponsive, though breathing was labored. Discussed the medication changes I would be making; they were in agreement with this, as they recognize that patient is actively dying, and they just want her to peaceful.   - End of life education provided, as well as discussion of inpatient hospice; they were agreeable to proceeding with this.   - Despite change in pt's status, they were in good spirits and quite humorous at times. They mentioned being appreciative of the fact that patient did not die at home alone. Agreed with them on this, and brought up the fact that pt called the ambulance to bring her to the hospital shows that she knows she needed help.   - Made visits throughout the day to assess symptom burden and check on family; let them know that our team has appreciated the privilege of meeting patient, and taking care of her during several hospital stays    6/15/2023:  - See notes by Dr Gutierrez and Dr Mckinney; pt transitioned to comfort-focused care

## 2023-06-16 NOTE — PLAN OF CARE
Order sent for stretcher transport to [facility]. Awaiting [facility] ambulance stretcher approval which they claim they will have in the next hour.

## 2023-06-16 NOTE — NURSING
Spoke w/ MD and palliative about pt's VS and decline. Order for Morphine increased d/t pt's labored breathing. Attempted to call son and update son on pt's status, no answer left voicemail.

## 2023-06-16 NOTE — ASSESSMENT & PLAN NOTE
- Patient actively dying, and with rapidly escalating symptom burden this morning. Pt with inadequate response to 4 mg dose of IV morphine; increased this to 6 mg dose. However, within 40-50 minutes of receiving higher dose, again had increased work of breathing  - For this reason, starting IV morphine infusion at 4 mg/hr, and will have available IV morphine 8 mg Q1H PRN nonverbal signs of dyspnea or pain. Will also see if RT can give her a breathing treatment, given her end stage COPD.   - She has also received 1 mg of lorazepam this morning; will continue this Q3H PRN anxiety, agitation   - Would benefit from inpatient hospice placement; discussed pt in person with hospice representative signing consents with family. Prognosis likely hours to days, which was discussed with family.

## 2023-06-16 NOTE — PLAN OF CARE
During the shift patient was transferred in from the ICU. She appeared drowsy, but was AAOX3. She was moaning in pain, pain was managed with IV Morphine 4mg as per MAR. She did not accept any food, slept intermittently and continued to mumble unclear utterances. Her VS is stable, she is currently on O2 2 LPM via NC, with good SATs (95 to 96%). Her positioning was done as indicated, purposeful rounding was done, bed alarm was set to ensure safety. No injury was recorded during the shift. Patient's skin remains ecchymotic and bruised as it was when she was received from ICU. Will continue to monitor.       Problem: Skin Injury Risk Increased  Goal: Skin Health and Integrity  Outcome: Ongoing, Progressing     Problem: Adult Inpatient Plan of Care  Goal: Plan of Care Review  Outcome: Ongoing, Progressing  Goal: Patient-Specific Goal (Individualized)  Outcome: Ongoing, Progressing  Goal: Absence of Hospital-Acquired Illness or Injury  Outcome: Ongoing, Progressing  Goal: Optimal Comfort and Wellbeing  Outcome: Ongoing, Progressing  Goal: Readiness for Transition of Care  Outcome: Ongoing, Progressing     Problem: Coping Ineffective  Goal: Effective Coping  Outcome: Ongoing, Progressing     Problem: Impaired Wound Healing  Goal: Optimal Wound Healing  Outcome: Ongoing, Progressing     Problem: Infection  Goal: Absence of Infection Signs and Symptoms  Outcome: Ongoing, Progressing

## 2023-06-16 NOTE — PROGRESS NOTES
[facility] Surg [facility])  Palliative Medicine  Progress Note    Patient Name: Nalini Varma  MRN: [id number]  Admission Date: 6/4/2023  Hospital Length of Stay: 11 days  Code Status: DNR   Attending Provider: Breana Mckinney MD  Consulting Provider: Beata Parks MD  Primary Care Physician: Yane Sahni MD  Principal Problem:Acute on chronic respiratory failure with hypoxia and hypercapnia    Assessment/Plan:     Palliative Care  End of life care  - Patient actively dying, and with rapidly escalating symptom burden this morning. Pt with inadequate response to 4 mg dose of IV morphine; increased this to 6 mg dose. However, within 40-50 minutes of receiving higher dose, again had increased work of breathing  - For this reason, starting IV morphine infusion at 4 mg/hr, and will have available IV morphine 8 mg Q1H PRN nonverbal signs of dyspnea or pain. Will also see if RT can give her a breathing treatment, given her end stage COPD.   - She has also received 1 mg of lorazepam this morning; will continue this Q3H PRN anxiety, agitation   - Would benefit from inpatient hospice placement; discussed pt in person with hospice representative signing consents with family. Prognosis likely hours to days, which was discussed with family.     Advance Care Planning       6/16/23  - Chart and interval history reviewed; discussed pt in person with primary team. Pt much more symptomatic this morning.   - Visited with pt's very supportive son Sammy and daughter-in-law Brenda at bedside; she was unresponsive, though breathing was labored. Discussed the medication changes I would be making; they were in agreement with this, as they recognize that patient is actively dying, and they just want her to peaceful.   - End of life education provided, as well as discussion of inpatient hospice; they were agreeable to proceeding with this.   - Despite change in pt's status, they were in good spirits and quite humorous at times.  They mentioned being appreciative of the fact that patient did not die at home alone. Agreed with them on this, and brought up the fact that pt called the ambulance to bring her to the hospital shows that she knows she needed help.   - Made visits throughout the day to assess symptom burden and check on family; let them know that our team has appreciated the privilege of meeting patient, and taking care of her during several hospital stays    6/15/2023:  - See notes by Dr Gutierrez and Dr Mckinney; pt transitioned to comfort-focused care     Other  Debility  - Essentially bedbound; contributes to hospice qualification     Pulmonary  Acute on chronic respiratory failure with hypoxia and hypercapnia  - Main hospice qualification     Chronic obstructive pulmonary disease  - See acute on chronic respiratory failure      Cardiac/Vascular  Chronic diastolic (congestive) heart failure  - Was diuresed during hospital stay; contributes to hospice qualification     Renal/  CKD (chronic kidney disease) stage 3, GFR 30-59 ml/min  - Contributes to hospice qualification    Oncology  Recurrent adenocarcinoma of left lung  - Contributes to hospice qualification    I will follow-up with patient. Please contact us if you have any additional questions.     GASTON Burris  Palliative Medicine Staff   [phone]    Total visit time: 65 minutes    > 50% of 35 min visit spent in chart review, face to face discussion of symptom assessment, coordination of care with other specialists, and discharge planning.    30 min ACP time spent: goals of care, emotional support, formulating and communicating prognosis, exploring burden/ benefit of various approaches of treatment.     Subjective:     Interval History: Has entered active dying phase.     Medications:  Continuous Infusions:   morphine 4 mg/hr (06/16/23 1126)     Scheduled Meds:   fluticasone furoate-vilanteroL  1 puff Inhalation Daily    miconazole nitrate 2%   Topical (Top) BID      PRN Meds:acetaminophen, albuterol-ipratropium, hydrOXYzine HCL, lorazepam, morphine, naloxone, ondansetron, sodium chloride 0.9%, sodium chloride 3%    Objective:     Vital Signs (Most Recent):  Temp: 98.2 °F (36.8 °C) (06/16/23 1225)  Pulse: (!) 119 (06/16/23 1225)  Resp: 16 (06/16/23 1254)  BP: (!) 176/81 (06/16/23 1225)  SpO2: 97 % (06/16/23 1225) Vital Signs (24h Range):  Temp:  [97.9 °F (36.6 °C)-98.4 °F (36.9 °C)] 98.2 °F (36.8 °C)  Pulse:  [] 119  Resp:  [16-40] 16  SpO2:  [91 %-97 %] 97 %  BP: (140-199)/(75-95) 176/81     Weight: 88.5 kg (195 lb 1.7 oz)  Body mass index is 31.51 kg/m².       Physical Exam  Constitutional:       Comments: During initial visit: unresponsive, lying in bed, appears uncomfortable, intermittent moaning    Pulmonary:      Comments: Loud expiratory noises (not wheezing), paradoxical abdominal breathing pattern with visible work of breathing   Skin:     General: Skin is warm.      Comments: No mottling    Neurological:      Comments: Unresponsive to voice     Significant Labs: All pertinent labs within the past 24 hours have been reviewed.  CBC:   Recent Labs   Lab 06/15/23  0307   WBC 12.03   HGB 9.8*   HCT 30.2*   MCV 97        BMP:  No results for input(s): GLU, NA, K, CL, CO2, BUN, CREATININE, CALCIUM, MG in the last 24 hours.  LFT:  Lab Results   Component Value Date    AST 45 (H) 06/15/2023    ALKPHOS 137 (H) 06/15/2023    BILITOT 0.5 06/15/2023     Albumin:   Albumin   Date Value Ref Range Status   06/15/2023 1.7 (L) 3.5 - 5.2 g/dL Final     Protein:   Total Protein   Date Value Ref Range Status   06/15/2023 5.7 (L) 6.0 - 8.4 g/dL Final     Lactic acid:   Lab Results   Component Value Date    LACTATE 1.1 02/13/2023    LACTATE 0.7 01/17/2023       Significant Imaging: I have reviewed all pertinent imaging results/findings within the past 24 hours.

## 2023-06-16 NOTE — SUBJECTIVE & OBJECTIVE
Interval History: Has entered active dying phase.     Medications:  Continuous Infusions:   morphine 4 mg/hr (06/16/23 1126)     Scheduled Meds:   fluticasone furoate-vilanteroL  1 puff Inhalation Daily    miconazole nitrate 2%   Topical (Top) BID     PRN Meds:acetaminophen, albuterol-ipratropium, hydrOXYzine HCL, lorazepam, morphine, naloxone, ondansetron, sodium chloride 0.9%, sodium chloride 3%    Objective:     Vital Signs (Most Recent):  Temp: 98.2 °F (36.8 °C) (06/16/23 1225)  Pulse: (!) 119 (06/16/23 1225)  Resp: 16 (06/16/23 1254)  BP: (!) 176/81 (06/16/23 1225)  SpO2: 97 % (06/16/23 1225) Vital Signs (24h Range):  Temp:  [97.9 °F (36.6 °C)-98.4 °F (36.9 °C)] 98.2 °F (36.8 °C)  Pulse:  [] 119  Resp:  [16-40] 16  SpO2:  [91 %-97 %] 97 %  BP: (140-199)/(75-95) 176/81     Weight: 88.5 kg (195 lb 1.7 oz)  Body mass index is 31.51 kg/m².       Physical Exam  Constitutional:       Comments: During initial visit: unresponsive, lying in bed, appears uncomfortable, intermittent moaning    Pulmonary:      Comments: Loud expiratory noises (not wheezing), paradoxical abdominal breathing pattern with visible work of breathing   Skin:     General: Skin is warm.      Comments: No mottling    Neurological:      Comments: Unresponsive to voice     Significant Labs: All pertinent labs within the past 24 hours have been reviewed.  CBC:   Recent Labs   Lab 06/15/23  0307   WBC 12.03   HGB 9.8*   HCT 30.2*   MCV 97        BMP:  No results for input(s): GLU, NA, K, CL, CO2, BUN, CREATININE, CALCIUM, MG in the last 24 hours.  LFT:  Lab Results   Component Value Date    AST 45 (H) 06/15/2023    ALKPHOS 137 (H) 06/15/2023    BILITOT 0.5 06/15/2023     Albumin:   Albumin   Date Value Ref Range Status   06/15/2023 1.7 (L) 3.5 - 5.2 g/dL Final     Protein:   Total Protein   Date Value Ref Range Status   06/15/2023 5.7 (L) 6.0 - 8.4 g/dL Final     Lactic acid:   Lab Results   Component Value Date    LACTATE 1.1 02/13/2023     LACTATE 0.7 01/17/2023       Significant Imaging: I have reviewed all pertinent imaging results/findings within the past 24 hours.

## 2023-06-21 ENCOUNTER — HOSPITAL ENCOUNTER (OUTPATIENT)
Dept: RADIOLOGY | Facility: HOSPITAL | Age: 73
Discharge: HOME | End: 2023-06-21
Attending: RADIOLOGY
Payer: MEDICARE

## 2023-06-21 NOTE — PHYSICIAN QUERY
PT Name: Nalini Varma  MR #: [id number]     DOCUMENTATION CLARIFICATION     CDS/:  Nima Adkins RN, CDS                   Contact Information:  [email or web address]    This form is a permanent document in the medical record.     Query Date: June 21, 2023    By submitting this query, we are merely seeking further clarification of documentation.  Please utilize your independent clinical judgment when addressing the question(s) below.    The Medical Record contains the following   Indicators Supporting Clinical Findings Location in Medical Record     X Heart Failure documented HFpEF    Chronic diastolic (congestive) heart failure Pulm PN 6/12    HM PN 6/15     X BNP  Labs 6/5     X EF/Echo The left ventricle is normal in size with concentric remodeling and normal systolic function.    Mild left atrial enlargement.    The estimated ejection fraction is 60%.    Grade III left ventricular diastolic dysfunction.    Normal right ventricular size with normal right ventricular systolic function.    There is mild aortic valve stenosis.    Aortic valve area is 1.50 cm2; peak velocity is 3.06 m/s; mean gradient is 22 mmHg.    Mild tricuspid regurgitation.   Echo 2/13/23 - Previous Encounter   X Radiology findings There has been progressive opacification of the left hemithorax, which is now essentially completely opacified.  This is thought to likely reflect some combination atelectasis, consolidation and pleural effusion.     Mild patchy airspace opacity in the right lung particularly the right lung base. This is also progressed from the prior exam. No definite right-sided pleural fluid or pneumothorax.     Cardiomediastinal silhouette partially obscured, grossly unchanged in size.  Probable slight leftward mediastinal shift suggesting predominant atelectasis in the left hemithorax.   CXR 6/11   X Subjective/Objective Respiratory Conditions On the morning of 6/11, the patient was noted to have some  increased lethargy, desaturations in the low 80s, and increased work of breathing. She was placed on a Venti-Mask, given a breathing treatment, CXR and ABG were ordered. CXR showed increased white out of the L lung and ABG of 7.525/37/51 on 40% FiO2.    Positive for shortness of breath and wheezing    Patient noted to be restless and dyspneic and appears ashen ?due to hypoxia     significant agitation when not sedated and complaining of severe air hunger when awakened. Unable to complete much of an interview without severe disorientation and agitation when awake    decompensated over weekend with increased SOB due to white out of L lung on imaging. Transferred to ICU and now on HFNC.    Acute on chronic respiratory failure with hypoxia and hypercapnia Pulm Consult 6/11                  CCM eICU 6/11       PN 6/12                 PN 6/15        Recent/Current MI      Heart Transplant, LVAD     X Edema, JVD 2+ pitting edema in dependent areas    Patient appears volume overloaded. Please aggressively diurese with IV lasix BID today into tomorrow    Pulm consult attestation 6/11      Ascites     X Diuretics/Meds -Continue GDMT w/ torsemide  -Intermittent doses of lasix    Furosemide injection 40-60mg IV     Pulm PN 6/12      MAR 6/11   X Other Treatment Continue Beta Blocker and ACE/ARB and monitor clinical status closely. Monitor on telemetry. Patient is off CHF pathway.  Monitor strict Is&Os and daily weights.  Place on fluid restriction of 1.5 L.  on diet for CHF.    Now on comfort care status    PN 6/15     X Other rapid response called for acute hypoxemic respiratory distress. Patient was on venturi mask 40% (8L) this morning and desaturated to 50-60% this evening. Patient appeared cold and cyanotic but alert. Dr. Austin was present. Lung auscultation revealed diminished air entry to bilateral lungs, L>R.     Diruesed 1 liter over past 24 hours.  SE 6/11            Pulm PN  attestation 6/13     Heart  failure is a clinical diagnosis which includes symptomatic fluid retention, elevated intracardiac pressures, and/or the inability of the heart to deliver adequate blood flow.    Heart Failure with reduced Ejection Fraction (HFrEF) or Systolic Heart Failure (loses ability to contract normally, EF is <40%)    Heart Failure with preserved Ejection Fraction (HFpEF) or Diastolic Heart Failure (stiff ventricles, does not relax properly, EF is >50%)     Heart Failure with Combined Systolic and Diastolic Failure (stiff ventricles, does not relax properly and EF is <50%)    Mid-range or mildly reduced ejection fraction (HFmrEF) is classified as systolic heart failure.  Congestive heart failure with a recovered EF is classified as Diastolic Heart Failure.  Common clues to acute exacerbation:  Rapidly progressive symptoms (w/in 2 weeks of presentation), using IV diuretics, using supplemental O2, pulmonary edema on Xray, new or worsening pleural effusion, +JVD or other signs of volume overload, MI w/in 4 weeks, and/or BNP >500  The clinical guidelines noted are only system guidelines, and do not replace the providers clinical judgment.    Provider, after study please clarify the acuity of the diagnosis Diastolic (congestive) Heart Failure.    [  x ]  Acute on Chronic Diastolic Heart Failure (HFpEF) - worsening of CHF signs/symptoms in preexisting CHF     [   ]  Chronic Diastolic Heart Failure (HFpEF) - preexisting and stable     [   ]  Other (please specify): ___________________________________         Please document in your progress notes daily for the duration of treatment until resolved and include in your discharge summary.    References:  American Heart Association editorial staff. (2017, May). Ejection Fraction Heart Failure Measurement. American Heart Association.  https://www.heart.org/en/health-topics/heart-failure/diagnosing-heart-failure/ejection-fraction-heart-failure-measurement#:~:text=Ejection%20fraction%20(EF)%20is%20a,pushed%20out%20with%20each%20heartbeat  ANDREW Holden (2020, December 15). Heart failure with preserved ejection fraction: Clinical manifestations and diagnosis. [employer]. https://www.[employer].org.[employer]/contents/heart-failure-with-preserved-ejection-fraction-clinical-manifestations-and-diagnosis.  ICD-10-CM/PCS Coding Clinic Third Quarter ICD-10, Effective with discharges: September 8, 2020 [facility] § Heart failure with mid-range or mildly reduced ejection fraction (2020).  ICD-10-CM/PCS Coding Clinic Third Quarter ICD-10, Effective with discharges: September 8, 2020 [facility] § Heart failure with recovered ejection fraction (2020).  Form No. [id number]

## 2023-07-11 ENCOUNTER — PATIENT MESSAGE (OUTPATIENT)
Dept: PSYCHIATRY | Facility: CLINIC | Age: 73
End: 2023-07-11
Payer: MEDICARE

## 2023-09-18 PROBLEM — J96.22 ACUTE ON CHRONIC RESPIRATORY FAILURE WITH HYPOXIA AND HYPERCAPNIA: Status: RESOLVED | Noted: 2023-06-11 | Resolved: 2023-09-18

## 2023-09-18 PROBLEM — J96.21 ACUTE ON CHRONIC RESPIRATORY FAILURE WITH HYPOXIA AND HYPERCAPNIA: Status: RESOLVED | Noted: 2023-06-11 | Resolved: 2023-09-18

## 2023-10-31 NOTE — PLAN OF CARE
Problem: Skin Injury Risk Increased  Goal: Skin Health and Integrity  Outcome: Ongoing, Progressing       conducted a Follow up consultation and Spiritual Assessment for Keila Hanna, who is a 79 y. o.,male. The  provided the following Interventions:  Continued the relationship of care and support. Listened empathically. Offered prayer and assurance of continued prayer on patients behalf. Chart reviewed. The following outcomes were achieved:  Patient expressed gratitude for 's visit. Assessment:  There are no further spiritual or Islam issues which require Spiritual Care Services interventions at this time. Plan:  Chaplains will continue to follow and will provide pastoral care on an as needed/requested basis.  recommends bedside caregivers page  on duty if patient shows signs of acute spiritual or emotional distress.        [address] [facility]  Staff [address] [address]   [phone]

## 2023-12-06 NOTE — ASSESSMENT & PLAN NOTE
- per Dr. Gilmore, has completed her course of treatment  - will need follow up with oncology outpatient on discharge   No

## 2024-01-10 ENCOUNTER — PATIENT MESSAGE (OUTPATIENT)
Dept: RESEARCH | Facility: CLINIC | Age: 74
End: 2024-01-10
Payer: MEDICAID

## 2024-01-31 NOTE — PHYSICIAN QUERY
PT Name: Nalini Varma  MR #: [id number]     DOCUMENTATION CLARIFICATION     CDS/: Judah Nichole RN              Contact information:   [email or web address]     This form is a permanent document in the medical record.     Query Date: February 7, 2023    By submitting this query, we are merely seeking further clarification of documentation.  Please utilize your independent clinical judgment when addressing the question(s) below.  The Medical Record contains the following   Indicators   Supporting Clinical Findings Location in Medical Record   x Documentation of Respiratory Failure, ARDS Problem:Acute on chronic respiratory failure with hypoxia and hypercapnia  Positive for cough (chronic, wet, clear sputum) and shortness of breath.      Effort: Respiratory distress (laboured breathing) present.      Breath sounds: Wheezing (expiratory wheeze) present.      Comments: On 4L O2 NC  she is on home oxygen at 3 LPM;  Acute on chronic respiratory failure with hypoxia and hypercapnia:   Signs/symptoms of respiratory failure include- tachypnea, increased work of breathing, respiratory distress, wheezing and lethargy   1/17  H&P: HM   x SOB, MEDELLIN, Wheezing, Productive Cough, Use of Accessory Muscles, etc. Wheezes: expiratory wheezes - improved.      Comments: Stable on home 3L O2 NC   1/19 HM   x RR     ABGs     O2 sat Resp:  [17-25] 19;  SpO2:  [97 %-100 %] 99 %;    Resp:  [16-25] 22;   SpO2:  [92 %-100 %] 96 %    Resp:  [14-22] 14;   SpO2:  [92 %-100 %] 98 %   1/17 H&P: HM    1/18 HM    1/19 HM    Hypoxia/Hypercapnia      BiPAP/Intubation/Mechanical Ventilation     x Supplemental O2 1/17-18  supplemental O2 at 4LNC> 3LNC  1/19-- 3LNC/ 32% conc  1/20 -- 3LNC>  1LNC/ 24%conc   Flow sheet    x Home O2, Oxygen Dependence she is on home oxygen at 3 LPM;   1/17 H&P: HM   x Respiratory distress   Effort: Respiratory distress (laboured breathing) present.    1/   x Radiology findings Pulmonary findings suggest edema  This is a 71-year-old patient who we are following for status post right total knee replacement arthroplasty which was done on 11 December here today for follow-up.  Patient states she is doing very well making good progress does have occasional sleeping issues.  She stopped taking her Norco and just only take an occasional tramadol she is still finishing off her last of her aspirin    Clinically: Right knee wound is healing well no signs of infection range of motion is 0-107 calf soft nontender no signs of DVT no foot drop gait was stable    X-ray: X-rays taken I reviewed it shows excellent position alignment of the prostheses integrating very well    Plan: Continue physical therapy wound care was recommended once the therapy has been completed she could probably just discontinue that and is continue home exercise programs she is to return to see us in 3 months for further follow-up.   noting left pleural effusion;  Increased parenchymal attenuation projects over the right upper lung zone, developing consolidation is not excluded noting the appearance is similar to previous exams   1/17 CXR   x Acute/Chronic Illness Problem:Acute on chronic respiratory failure with hypoxia and hypercapnia  PMHx of COPD (on home O2 3L) with 53 pack year smoking hx, current smoker, lung CA (currently on radiation and chemo) chronic anemia, anxiety, CKD Stage 3, systolic and diastolic heart failure    1/17 H&P: HM    Treatment     x Other Pleural effusion;   Recurrent adenocarcinoma of left lung;--Followed closely by Heme/Onc, lung adenocarcinoma dx 2020 stage 1 with presumed recurrence now on Chemo/XRT  Chronic obstructive pulmonary disease--COPD GOLD 2D with PFTs confirming dx in 2020 1/18 pulmonology       The noted clinical guidelines following a diagnosis are only system guidelines and do not replace the providers clinical judgment.    Due to the conflicting clinical picture, please clinically validate the diagnosis of __Acute on chronic respiratory failure _______________.    If validated, please provide additional clinical support for the diagnosis.     [    ] Above stated diagnosis is not confirmed and/or it has been ruled out   [    ] Above stated diagnosis is not confirmed and/or it has been ruled out, other diagnosis ruled in (please specify):_______________   [    ] Chronic Respiratory Failure with Hypoxia and Hypercapnia (Continuous home oxygen and normal pH with high pCO2 (chronic hypercapnia) (common example: COPD)) diagnosis is confirmed and additional clinical support/decision-making indicators for the diagnosis include (please specify):      [ x   ] Acute and (on) Chronic Respiratory Failure with Hypoxia and Hypercapnia (Hypoxic: pO2 >10 mmHg below baseline or SpO2 < 91% on usual home O2 or O2 ? 2L/min over baseline home O2 and Hypercapnic: pCO2 >10 mmHg over baseline and pH < 7.35 and respiratory  symptoms documented) diagnosis is confirmed and additional clinical support/decision-making indicators for the diagnosis include (please specify): _______________________________________________   [    ] Chronic Respiratory Failure with Hypoxia (Continuous home oxygen use) diagnosis is confirmed and additional clinical support/decision-making indicators for the diagnosis include (please specify): _______________________________________________   [    ] Chronic Respiratory Failure with Hypercapnia (Normal pH with high pCO2 (chronic hypercapnia) (common example: COPD)) diagnosis is confirmed and additional clinical support/decision-making indicators for the diagnosis include (please specify): _______________________________________________   [    ] Chronic Respiratory Failure diagnosis is confirmed and additional clinical support/decision-making indicators for the diagnosis include (please specify): _______________________________________________     [    ] Other clarification (please specify): ___________________     Please document in your progress notes daily for the duration of treatment until resolved and include in your discharge summary.     Reference:    LESLY Ellsworth MD. (2020, March 13). Acute respiratory distress syndrome: Clinical features, diagnosis, and complications in adults ([id number] ROCCO Calzada MD & [id number] CIARRA Herron MD, Eds.). Retrieved November 13, 2020, from https://www.[employer]/contents/acute-respiratory-distress-syndrome-clinical-features-diagnosis-and-complications-in-adults?search=ards&source=search_result&selectedTitle=1~150&usage_type=default&display_rank=1  Form No. [id number]

## 2024-09-23 NOTE — DISCHARGE SUMMARY
[facility], [place] Medicine  Discharge Summary      Patient Name: Nalini Varma  MRN: [id number]  Patient Class: IP- Inpatient  Admission Date: 1/17/2023  Hospital Length of Stay: 7 days  Discharge Date and Time: 1/24/2023  6:28 PM  Attending Physician: No att. providers found   Discharging Provider: Elena Dahl MD  Primary Care Provider: Yane Sahni MD      HPI:   Nalini Varma is a 72 year old female with PMHx of COPD (on home O2 3L) with 53 pack year smoking hx, current smoker, lung CA (currently on radiation and chemo) chronic anemia, anxiety, CKD Stage 3, systolic and diastolic heart failure (EF 55%, Grade II diastolic dysfunction, 12/24/22) presenting to ER for hypotension and increased shortness of breath for 6 days. Patient reports non-bloody diarrhea for 6 days, associated with loss of appetite, nausea, non-bloody vomiting, lethargy, chills. Patient also reports increased SOB but denies purulent sputum, sore throat or fever. Patient denies orthopnea or bilateral lower extremity swelling. Patient also reports dysuria and suprapubic tenderness.     H/H 8.4/25.4 elevated from baseline 7.7/23.3, likely due to hypovolemia. No leukocytosis but elevated lactate 2.3. UA with nitrites and +3 leukocytes. Hyponatremia 127 (baseline 133), hypocalcemia 8.2 (corrected for hypoalbuminemia 1.2). Cr elevated 1.4 from baseline 1.0, and an elevated AG 18. CXR with findings of  edema noting left pleural effusion. BNP elevated at 139. Patient was given IV calcium gluconate 1g, IV Mag sulfate 2g and IV NaCl 0.9% 1.5L in ER. Nephrology and Pulmonology was consulted. Patient was started on IV Ceftriaxone 1g for symptomatic UTI.    Patient is admitted to inpatient service with Hospital Medicine for management of electrolyte abnormalities, left pleural effusion and symptomatic UTI.       * No surgery found *      Hospital Course:   See below       Goals of Care Treatment Preferences:  Code Status:  Full Code      Consults:   Consults (From admission, onward)        Status Ordering Provider     Inpatient virtual consult to Hospital Medicine  Once        Provider:  Elena Dahl MD    Completed TAL MARTINEZ     Inpatient consult to Palliative Care  Once        Provider:  Beata Parks MD    Completed TAL MARTINEZ     Inpatient consult to Registered Dietitian/Nutritionist  Once        Provider:  (Not yet assigned)    Completed REAGAN SNOW     Inpatient consult to Nephrology-[place]  Once        Provider:  Catrina Bustillo MD    Completed REAGAN SNOW     Inpatient consult to Pulmonology  Once        Provider:  (Not yet assigned)    Completed REAGAN SNOW          Advance care planning  Patient wants to continue full code, with chest compressions and mechanical ventilation.      Acute renal failure superimposed on stage 3 chronic kidney disease  Elevated Creatinine of 1.4 on admission with a baseline of 1.0.  Likely due to hypovolemia from GI losses.  Appreciate Nephrology recommendations.  S/p IV fluids with improvement of creatinine back to normal - 0.7 today  -Monitor    Recurrent adenocarcinoma of left lung  Patient is currently on chemo (last on 1/6/23) and radiation.  Reports missing radiation the whole of last week      Syndrome of inappropriate secretion of antidiuretic hormone  Na 127 on admission and improved at 136 this morning  -Monitor      Acute on chronic respiratory failure with hypoxia and hypercapnia  Patient presented with increased SOB from her baseline.  She still smokes 1ppd and on home O2 at 3L NC.  Likely from COPD exacerbation. CXR on admission with findings suggest edema noting left pleural effusion.  Increased parenchymal attenuation projects over the right upper lung zone, developing consolidation is not excluded noting the appearance is similar to previous exams.  Labs on admission with Lactic Acid elevated at 2.3 and normalized.  .  Improved and  stable.    Patient seen by Pulmonary and "The patient's working assessments include diarrhea, COPD, small L sided pleural effusion by POCUS.  Do not suspect the chronic effusion is operant in her current admit and do not advise tap.  Continue home COPD regimen".  Lung exam with much improved wheezing.    Plan:  Continue with O2 at tolerated to keep SpO2 >92%   Continue with Duonebs Q4 wake  Encourage smoking cessation  Strict intake and output      Debility  Patient mostly confined to Wheelchair after previously using Rolator at home.  Has chronic pain from OA of Knees, chronic LBP and Left Shoulder pain. Deconditioned after her diarrheal illness as above.  She lives alone.    -Consulted PT - recommends home PT on discharge      Chronic diastolic heart failure  TTE in 12/2022 with EF of 55% and Grade II left ventricular diastolic dysfunction.  .    -See above      Hyperkalemia  - starting lokelma      Generalized anxiety disorder  Hx of anxiety, MDD - Chronic. Controlled.  -Continue with Alprazolam 0.25mg PRN  -Continue home meds of Duloxetine 60mg daily, Trazodone 100mg qhs PRN      Tobacco dependence  Smokes 1ppd.  Patient denies need for nicotine patches. Wants to quit cold turkey.  Discussed dangers of smoking with use of home oxygen.  -Continue to encourage smoking cessation      Essential hypertension  Hypotensive from GI losses and sepsis secondary to UTI   -Continue with Metoprolol XL 50mg  -Continue to hold home dose losartan 100mg PO daily      Chronic obstructive pulmonary disease  As above        Final Active Diagnoses:    Diagnosis Date Noted POA    Acute renal failure superimposed on stage 3 chronic kidney disease [N17.9, N18.30] 12/23/2022 Yes    Advance care planning [Z71.89] 12/23/2022 Not Applicable    Recurrent adenocarcinoma of left lung [C34.92] 10/28/2022 Yes    Syndrome of inappropriate secretion of antidiuretic hormone [E22.2] 10/02/2022 Yes    Acute on chronic respiratory failure " with hypoxia and hypercapnia [J96.21, J96.22] 05/13/2021 Yes    Debility [R53.81] 05/13/2021 Yes    Chronic diastolic heart failure [I50.32] 09/17/2018 Yes    Hyperkalemia [E87.5] 05/23/2018 No    Generalized anxiety disorder [F41.1] 11/14/2016 Yes    Chronic obstructive pulmonary disease [J44.9] 03/16/2016 Yes    Essential hypertension [I10] 03/16/2016 Yes    Tobacco dependence [F17.200] 03/16/2016 Yes      Problems Resolved During this Admission:    Diagnosis Date Noted Date Resolved POA    PRINCIPAL PROBLEM:  C Diff Diarrhea complicated by Hypovolemia and Electrolyte abnormalities [E86.1] 01/18/2023 02/04/2023 Yes    Urinary tract infection without hematuria [N39.0] 04/30/2021 02/04/2023 Yes       Discharged Condition: stable    Disposition: Home or Self Care    Follow Up:   Follow-up Information     [facility].    Specialty: DME Provider  Why: contact once ready for delivery of hospital bed  Contact information:  [address]  [facility] [address]  [phone]             Yane Sahni MD Follow up in 1 week(s).    Specialty: Internal Medicine  Contact information:  [address]  [facility] [id number] [phone]                       Patient Instructions:      HOSPITAL BED FOR HOME USE     Order Specific Question Answer Comments   Type: Semi-electric    Length of need (1-99 months): 99    Does patient have medical equipment at home? wheelchair    Does patient have medical equipment at home? oxygen    Height: 5' 6" (1.676 m)    Weight: 96.2 kg (212 lb 1.3 oz)    Please check all that apply: Patient requires positioning of the body in ways not feasible in an ordinary bed due to a medical condition which is expected to last at least one month.    Please check all that apply: Patient requires, for the alleviation of pain, positioning of the body in ways not feasible in an ordinary bed.    Please check all that apply: Patient requires a bed height different than a " the exact cost and mode of payment for your PRP injection prior to scheduling the procedure.     fixed height hospital bed to permit transfers to chair, wheelchair, or standing.    Please check all that apply: Patient requires the head of bed to be elevated more than 30 degrees most of the time due to congestive heart failure, chronic pulmonary disease, or aspiration.  Pillows and wedges have been considered and ruled out.        Significant Diagnostic Studies: Labs: All labs within the past 24 hours have been reviewed    Pending Diagnostic Studies:     None         Medications:  Reconciled Home Medications:      Medication List      CHANGE how you take these medications    losartan 100 MG tablet  Commonly known as: COZAAR  Take 1 tablet (100 mg total) by mouth once daily. HOLD UNTIL YOU SEE YOUR PCP  What changed: additional instructions        CONTINUE taking these medications    ADVAIR -21 mcg/actuation Hfaa inhaler  Generic drug: fluticasone propion-salmeterol 115-21 mcg/dose  Inhale 2 puffs into the lungs every 12 (twelve) hours.     ALPRAZolam 0.25 MG tablet  Commonly known as: XANAX  Take 1 tablet (0.25 mg total) by mouth daily as needed for Anxiety.     ascorbic acid (vitamin C) 250 MG tablet  Commonly known as: VITAMIN C  Take 250 mg by mouth once daily.     atorvastatin 40 MG tablet  Commonly known as: LIPITOR  TAKE 1 TABLET BY MOUTH EVERY DAILY     azelastine 137 mcg (0.1 %) nasal spray  Commonly known as: ASTELIN  Instill 1 spray (137 mcg total) by Nasal route 2 (two) times daily.     b complex vitamins capsule  Take 1 capsule by mouth once daily.     biotin 10 mg Tab  Take 10 mg by mouth once daily.     CENTRUM SILVER WOMEN ORAL  Take 1 tablet by mouth once daily.     cyanocobalamin (vitamin B-12) 1,000 mcg Tbsr  Take 1,000 mcg by mouth once daily.     DULoxetine 60 MG capsule  Commonly known as: CYMBALTA  Take 1 capsule (60 mg total) by mouth once daily.     fluticasone propionate 50 mcg/actuation nasal spray  Commonly known as: FLONASE  1 spray by Each Nare route 2 (two) times daily as  needed for Rhinitis.     gabapentin 300 MG capsule  Commonly known as: NEURONTIN  Take 1 capsule (300 mg total) by mouth 3 (three) times daily.     guaiFENesin 600 mg 12 hr tablet  Commonly known as: MUCINEX  Take 1,200 mg by mouth 2 (two) times daily as needed for Congestion.     HYDROcodone-acetaminophen  mg per tablet  Commonly known as: NORCO  Take 1 tablet by mouth every 12 (twelve) hours as needed for Pain.     INCRUSE ELLIPTA 62.5 mcg/actuation inhalation capsule  Generic drug: umeclidinium  Inhale 1 puff into the lungs once daily. Controller     lutein 40 mg Cap  Take by mouth.     magnesium oxide 400 mg (241.3 mg magnesium) tablet  Commonly known as: MAG-OX  Take 1 tablet by mouth every 12 (twelve) hours.     metoprolol succinate 25 MG 24 hr tablet  Commonly known as: TOPROL-XL  Take 2 tablets (50 mg total) by mouth once daily.     ondansetron 8 MG Tbdl  Commonly known as: ZOFRAN-ODT  dissolve 1 tablet (8 mg total) by mouth every 8 (eight) hours as needed (nausea).     OYSTER SHELL CALCIUM 500 500 mg calcium (1,250 mg) tablet  Generic drug: calcium carbonate  Take 2 tablets (1,000 mg total) by mouth 2 (two) times daily.     pantoprazole 40 MG tablet  Commonly known as: PROTONIX  Take 1 tablet (40 mg total) by mouth once daily.     PROLIA 60 mg/mL Syrg  Generic drug: denosumab  Inject 1 mL (60 mg total) into the skin every 6 (six) months.     promethazine 25 MG tablet  Commonly known as: PHENERGAN  Take 1 tablet (25 mg total) by mouth every 6 (six) hours as needed for Nausea.     torsemide 20 MG Tab  Commonly known as: DEMADEX  Take 1 tablet (20 mg total) by mouth once daily.     traZODone 100 MG tablet  Commonly known as: DESYREL  Take 1 tablet (100 mg total) by mouth every evening.     vitamin D 1000 units Tab  Commonly known as: VITAMIN D3  Take 1 tablet (1,000 Units total) by mouth once daily.     ZINC ORAL  Take by mouth.        ASK your doctor about these medications    LOKELMA 10 gram  packet  Generic drug: sodium zirconium cyclosilicate  Take 1 packet (10 g total) by mouth once daily. Mix entire contents of packet(s) into drinking glass containing 3 tablespoons of water; stir well and drink immediately. Add water and repeat until no powder remains to receive entire dose. for 5 days  Ask about: Should I take this medication?     vancomycin 25 mg/mL solution  Take 5 mLs (125 mg total) by mouth every 6 (six) hours for 7 days and please discard remainder  Ask about: Should I take this medication?            Indwelling Lines/Drains at time of discharge:   Lines/Drains/Airways     None                 Time spent on the discharge of patient: > 35 minutes         The attending portion of this evaluation, treatment, and documentation was performed per Elena Dahl MD via Telemedicine AudioVisual using the secure [employer] software platform with 2 way audio/video. The provider was located off-site and the patient is located in the hospital. The aforementioned video software was utilized to document the relevant history and physical exam    Elena Dahl MD  Department of Hospital Medicine  [facility])